# Patient Record
Sex: FEMALE | Race: BLACK OR AFRICAN AMERICAN | Employment: OTHER | URBAN - METROPOLITAN AREA
[De-identification: names, ages, dates, MRNs, and addresses within clinical notes are randomized per-mention and may not be internally consistent; named-entity substitution may affect disease eponyms.]

---

## 2022-06-10 ENCOUNTER — OFFICE VISIT (OUTPATIENT)
Dept: FAMILY MEDICINE CLINIC | Facility: CLINIC | Age: 77
End: 2022-06-10
Payer: COMMERCIAL

## 2022-06-10 VITALS
HEART RATE: 105 BPM | DIASTOLIC BLOOD PRESSURE: 75 MMHG | BODY MASS INDEX: 24.79 KG/M2 | WEIGHT: 147 LBS | HEIGHT: 64.5 IN | SYSTOLIC BLOOD PRESSURE: 118 MMHG

## 2022-06-10 DIAGNOSIS — M05.79 RHEUMATOID ARTHRITIS INVOLVING MULTIPLE SITES WITH POSITIVE RHEUMATOID FACTOR (HCC): ICD-10-CM

## 2022-06-10 DIAGNOSIS — I10 PRIMARY HYPERTENSION: ICD-10-CM

## 2022-06-10 DIAGNOSIS — K21.9 GASTROESOPHAGEAL REFLUX DISEASE, UNSPECIFIED WHETHER ESOPHAGITIS PRESENT: ICD-10-CM

## 2022-06-10 DIAGNOSIS — G62.9 SENSORY NEUROPATHY: Primary | ICD-10-CM

## 2022-06-10 DIAGNOSIS — G89.29 OTHER CHRONIC PAIN: ICD-10-CM

## 2022-06-10 PROCEDURE — 3074F SYST BP LT 130 MM HG: CPT | Performed by: FAMILY MEDICINE

## 2022-06-10 PROCEDURE — 3078F DIAST BP <80 MM HG: CPT | Performed by: FAMILY MEDICINE

## 2022-06-10 PROCEDURE — 1126F AMNT PAIN NOTED NONE PRSNT: CPT | Performed by: FAMILY MEDICINE

## 2022-06-10 PROCEDURE — 3008F BODY MASS INDEX DOCD: CPT | Performed by: FAMILY MEDICINE

## 2022-06-10 PROCEDURE — 99204 OFFICE O/P NEW MOD 45 MIN: CPT | Performed by: FAMILY MEDICINE

## 2022-06-10 RX ORDER — OXYCODONE HYDROCHLORIDE 10 MG/1
TABLET ORAL EVERY 6 HOURS
COMMUNITY
End: 2022-06-10

## 2022-06-10 RX ORDER — SACUBITRIL AND VALSARTAN 24; 26 MG/1; MG/1
TABLET, FILM COATED ORAL
COMMUNITY

## 2022-06-10 RX ORDER — CARVEDILOL 6.25 MG/1
6.25 TABLET ORAL 2 TIMES DAILY
Qty: 60 TABLET | Refills: 2 | Status: SHIPPED | OUTPATIENT
Start: 2022-06-10

## 2022-06-10 RX ORDER — FUROSEMIDE 40 MG/1
40 TABLET ORAL DAILY
COMMUNITY
Start: 2022-05-08

## 2022-06-10 RX ORDER — FUROSEMIDE 20 MG/1
TABLET ORAL
COMMUNITY

## 2022-06-10 RX ORDER — CARVEDILOL 6.25 MG/1
6.25 TABLET ORAL 2 TIMES DAILY
COMMUNITY
Start: 2022-02-12 | End: 2022-06-10

## 2022-06-10 RX ORDER — PREGABALIN 50 MG/1
50 CAPSULE ORAL 3 TIMES DAILY
Qty: 90 CAPSULE | Refills: 0 | Status: SHIPPED | OUTPATIENT
Start: 2022-06-10

## 2022-06-10 RX ORDER — PANTOPRAZOLE SODIUM 40 MG/1
40 TABLET, DELAYED RELEASE ORAL DAILY
Qty: 30 TABLET | Refills: 2 | Status: SHIPPED | OUTPATIENT
Start: 2022-06-10

## 2022-06-10 RX ORDER — OXYCODONE HYDROCHLORIDE 10 MG/1
10 TABLET ORAL EVERY 6 HOURS
Qty: 120 TABLET | Refills: 0 | Status: SHIPPED | OUTPATIENT
Start: 2022-06-10

## 2022-06-10 RX ORDER — ALENDRONATE SODIUM 70 MG/1
70 TABLET ORAL WEEKLY
COMMUNITY
Start: 2022-05-16

## 2022-06-10 RX ORDER — PANTOPRAZOLE SODIUM 40 MG/1
40 TABLET, DELAYED RELEASE ORAL DAILY
COMMUNITY
Start: 2022-04-07 | End: 2022-06-10

## 2022-06-10 RX ORDER — AMLODIPINE BESYLATE 10 MG/1
TABLET ORAL
COMMUNITY

## 2022-06-10 NOTE — PATIENT INSTRUCTIONS
All adult screening ordered and done appropriate for patient's age and gender and risk factors and complaints. Medication reviewed and renewed where needed and appropriate. Comply with medications. Monitor blood pressures and record at home. Limit salt intake. Keep all specialist appointments.   Will need ortho/hand specialist.

## 2022-07-05 DIAGNOSIS — M05.79 RHEUMATOID ARTHRITIS INVOLVING MULTIPLE SITES WITH POSITIVE RHEUMATOID FACTOR (HCC): ICD-10-CM

## 2022-07-06 NOTE — TELEPHONE ENCOUNTER
Please review. Protocol failed or does not have a protocol.      Requested Prescriptions   Pending Prescriptions Disp Refills    METHOTREXATE 2.5 MG Oral Tab [Pharmacy Med Name: METHOTREXATE 2.5MG TABLETS - YELLOW] 77 tablet 0     Sig: TAKE 6 TABLETS BY MOUTH ONE DAY A WEEK        There is no refill protocol information for this order         Future Appointments         Provider Department Appt Notes    In 2 days Aniyah Espinal DO [facility], [facility], [facility] [place] [address] 1 month f/u per Dr. Irasema Caceres Visits              3 weeks ago Sensory neuropathy    [facility], [facility], [address]    Office Visit

## 2022-07-07 ENCOUNTER — OFFICE VISIT (OUTPATIENT)
Dept: FAMILY MEDICINE CLINIC | Facility: CLINIC | Age: 77
End: 2022-07-07
Payer: COMMERCIAL

## 2022-07-07 VITALS
HEART RATE: 97 BPM | WEIGHT: 152.38 LBS | HEIGHT: 64.5 IN | DIASTOLIC BLOOD PRESSURE: 59 MMHG | BODY MASS INDEX: 25.7 KG/M2 | SYSTOLIC BLOOD PRESSURE: 91 MMHG | OXYGEN SATURATION: 99 %

## 2022-07-07 DIAGNOSIS — M05.79 RHEUMATOID ARTHRITIS INVOLVING MULTIPLE SITES WITH POSITIVE RHEUMATOID FACTOR (HCC): ICD-10-CM

## 2022-07-07 DIAGNOSIS — M51.36 LUMBAR DEGENERATIVE DISC DISEASE: ICD-10-CM

## 2022-07-07 DIAGNOSIS — M54.2 BILATERAL POSTERIOR NECK PAIN: ICD-10-CM

## 2022-07-07 DIAGNOSIS — Z13.820 ENCOUNTER FOR OSTEOPOROSIS SCREENING IN ASYMPTOMATIC POSTMENOPAUSAL PATIENT: Primary | ICD-10-CM

## 2022-07-07 DIAGNOSIS — Z78.0 ENCOUNTER FOR OSTEOPOROSIS SCREENING IN ASYMPTOMATIC POSTMENOPAUSAL PATIENT: Primary | ICD-10-CM

## 2022-07-07 PROCEDURE — 3074F SYST BP LT 130 MM HG: CPT | Performed by: FAMILY MEDICINE

## 2022-07-07 PROCEDURE — 1125F AMNT PAIN NOTED PAIN PRSNT: CPT | Performed by: FAMILY MEDICINE

## 2022-07-07 PROCEDURE — 3008F BODY MASS INDEX DOCD: CPT | Performed by: FAMILY MEDICINE

## 2022-07-07 PROCEDURE — 3078F DIAST BP <80 MM HG: CPT | Performed by: FAMILY MEDICINE

## 2022-07-07 PROCEDURE — 99214 OFFICE O/P EST MOD 30 MIN: CPT | Performed by: FAMILY MEDICINE

## 2022-07-07 RX ORDER — HYDROCODONE BITARTRATE AND ACETAMINOPHEN 10; 325 MG/1; MG/1
1 TABLET ORAL EVERY 6 HOURS PRN
Qty: 120 TABLET | Refills: 0 | Status: SHIPPED | OUTPATIENT
Start: 2022-07-07

## 2022-07-07 NOTE — PATIENT INSTRUCTIONS
Pain management via prescription medications as needed. Medication reviewed and renewed where needed and appropriate. Comply with medications. Monitor blood pressures and record at home. Limit salt intake. Keep appointments with rheumatologist.  X-ray of the cervical spine recommended and ordered on today. Patient also would benefit from physiatry referral which has been placed on the patient's chart. Patient to have hydrocodone prescription sent to the pharmacy.

## 2022-07-13 DIAGNOSIS — K21.9 GASTROESOPHAGEAL REFLUX DISEASE, UNSPECIFIED WHETHER ESOPHAGITIS PRESENT: ICD-10-CM

## 2022-07-13 RX ORDER — PANTOPRAZOLE SODIUM 40 MG/1
40 TABLET, DELAYED RELEASE ORAL DAILY
Qty: 30 TABLET | Refills: 2 | Status: SHIPPED | OUTPATIENT
Start: 2022-07-13

## 2022-07-19 RX ORDER — MAGNESIUM OXIDE 400 MG (241.3 MG MAGNESIUM) TABLET
TABLET
COMMUNITY

## 2022-07-20 ENCOUNTER — HOSPITAL ENCOUNTER (OUTPATIENT)
Dept: BONE DENSITY | Facility: HOSPITAL | Age: 77
Discharge: HOME | End: 2022-07-20
Attending: FAMILY MEDICINE
Payer: MEDICARE

## 2022-07-20 ENCOUNTER — HOSPITAL ENCOUNTER (OUTPATIENT)
Dept: GENERAL RADIOLOGY | Facility: HOSPITAL | Age: 77
Discharge: HOME | End: 2022-07-20
Attending: FAMILY MEDICINE
Payer: MEDICARE

## 2022-07-20 DIAGNOSIS — M54.2 BILATERAL POSTERIOR NECK PAIN: ICD-10-CM

## 2022-07-20 DIAGNOSIS — Z78.0 ENCOUNTER FOR OSTEOPOROSIS SCREENING IN ASYMPTOMATIC POSTMENOPAUSAL PATIENT: ICD-10-CM

## 2022-07-20 DIAGNOSIS — Z13.820 ENCOUNTER FOR OSTEOPOROSIS SCREENING IN ASYMPTOMATIC POSTMENOPAUSAL PATIENT: ICD-10-CM

## 2022-07-20 PROCEDURE — 72050 X-RAY EXAM NECK SPINE 4/5VWS: CPT | Performed by: FAMILY MEDICINE

## 2022-07-20 PROCEDURE — 77080 DXA BONE DENSITY AXIAL: CPT | Performed by: FAMILY MEDICINE

## 2022-07-22 DIAGNOSIS — G62.9 SENSORY NEUROPATHY: ICD-10-CM

## 2022-07-22 RX ORDER — PREGABALIN 50 MG/1
50 CAPSULE ORAL 3 TIMES DAILY
Qty: 90 CAPSULE | Refills: 0 | Status: SHIPPED | OUTPATIENT
Start: 2022-07-22

## 2022-08-09 DIAGNOSIS — M05.79 RHEUMATOID ARTHRITIS INVOLVING MULTIPLE SITES WITH POSITIVE RHEUMATOID FACTOR (HCC): ICD-10-CM

## 2022-08-09 DIAGNOSIS — M51.36 LUMBAR DEGENERATIVE DISC DISEASE: ICD-10-CM

## 2022-08-09 DIAGNOSIS — M54.2 BILATERAL POSTERIOR NECK PAIN: ICD-10-CM

## 2022-08-09 RX ORDER — FUROSEMIDE 20 MG/1
TABLET ORAL
Qty: 30 TABLET | Refills: 0 | Status: SHIPPED | OUTPATIENT
Start: 2022-08-09 | End: 2022-08-10

## 2022-08-09 RX ORDER — HYDROCODONE BITARTRATE AND ACETAMINOPHEN 10; 325 MG/1; MG/1
1 TABLET ORAL EVERY 6 HOURS PRN
Qty: 120 TABLET | Refills: 0 | Status: SHIPPED | OUTPATIENT
Start: 2022-08-09

## 2022-08-09 NOTE — TELEPHONE ENCOUNTER
Received call from patient requesting refills. Please review and update pended scripts and sign if appropriate.

## 2022-08-10 RX ORDER — FUROSEMIDE 20 MG/1
TABLET ORAL
Qty: 90 TABLET | Refills: 0 | Status: SHIPPED | OUTPATIENT
Start: 2022-08-10

## 2022-08-18 ENCOUNTER — OFFICE VISIT (OUTPATIENT)
Dept: FAMILY MEDICINE CLINIC | Facility: CLINIC | Age: 77
End: 2022-08-18
Payer: COMMERCIAL

## 2022-08-18 VITALS
DIASTOLIC BLOOD PRESSURE: 88 MMHG | BODY MASS INDEX: 25 KG/M2 | SYSTOLIC BLOOD PRESSURE: 150 MMHG | TEMPERATURE: 98 F | HEART RATE: 72 BPM | OXYGEN SATURATION: 99 % | RESPIRATION RATE: 16 BRPM | WEIGHT: 147.63 LBS

## 2022-08-18 DIAGNOSIS — M47.22 OSTEOARTHRITIS OF SPINE WITH RADICULOPATHY, CERVICAL REGION: ICD-10-CM

## 2022-08-18 DIAGNOSIS — M43.12 ANTEROLISTHESIS OF CERVICAL SPINE: Primary | ICD-10-CM

## 2022-08-18 DIAGNOSIS — G62.9 SENSORY NEUROPATHY: ICD-10-CM

## 2022-08-18 PROCEDURE — 3079F DIAST BP 80-89 MM HG: CPT | Performed by: FAMILY MEDICINE

## 2022-08-18 PROCEDURE — 99214 OFFICE O/P EST MOD 30 MIN: CPT | Performed by: FAMILY MEDICINE

## 2022-08-18 PROCEDURE — 3077F SYST BP >= 140 MM HG: CPT | Performed by: FAMILY MEDICINE

## 2022-08-18 RX ORDER — MAGNESIUM OXIDE 400 MG (241.3 MG MAGNESIUM) TABLET
800 TABLET DAILY
Qty: 90 TABLET | Refills: 1 | Status: SHIPPED | OUTPATIENT
Start: 2022-08-18

## 2022-08-18 RX ORDER — PREGABALIN 50 MG/1
50 CAPSULE ORAL 3 TIMES DAILY
Qty: 90 CAPSULE | Refills: 0 | Status: SHIPPED | OUTPATIENT
Start: 2022-08-18

## 2022-08-18 NOTE — PATIENT INSTRUCTIONS
To physiatry regarding cervical complaints and findings. Keep rheumatology follow up appointments. Pain management. 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 Patient has C4-C5 anterolisthesis, multiple levels of vertebral body and facet joint spurs, and also narrowed discs at C5-C6 and also C6-C7.

## 2022-09-08 DIAGNOSIS — I10 PRIMARY HYPERTENSION: ICD-10-CM

## 2022-09-08 DIAGNOSIS — M54.2 BILATERAL POSTERIOR NECK PAIN: ICD-10-CM

## 2022-09-08 DIAGNOSIS — M51.36 LUMBAR DEGENERATIVE DISC DISEASE: ICD-10-CM

## 2022-09-08 DIAGNOSIS — M05.79 RHEUMATOID ARTHRITIS INVOLVING MULTIPLE SITES WITH POSITIVE RHEUMATOID FACTOR (HCC): ICD-10-CM

## 2022-09-08 NOTE — TELEPHONE ENCOUNTER
Please review refill protocol failed/ no protocol  Requested Prescriptions   Pending Prescriptions Disp Refills    HYDROcodone-acetaminophen (NORCO)  MG Oral Tab 120 tablet 0     Sig: Take 1 tablet by mouth every 6 (six) hours as needed for Pain.         There is no refill protocol information for this order

## 2022-09-09 RX ORDER — HYDROCODONE BITARTRATE AND ACETAMINOPHEN 10; 325 MG/1; MG/1
1 TABLET ORAL EVERY 6 HOURS PRN
Qty: 120 TABLET | Refills: 0 | Status: SHIPPED | OUTPATIENT
Start: 2022-09-09 | End: 2022-09-12

## 2022-09-09 RX ORDER — CARVEDILOL 6.25 MG/1
6.25 TABLET ORAL 2 TIMES DAILY
Qty: 60 TABLET | Refills: 2 | Status: SHIPPED | OUTPATIENT
Start: 2022-09-09

## 2022-09-09 RX ORDER — HYDROCODONE BITARTRATE AND ACETAMINOPHEN 10; 325 MG/1; MG/1
1 TABLET ORAL EVERY 6 HOURS PRN
Qty: 120 TABLET | Refills: 0 | Status: SHIPPED | OUTPATIENT
Start: 2022-09-09

## 2022-09-12 ENCOUNTER — HOSPITAL ENCOUNTER (OUTPATIENT)
Dept: GENERAL RADIOLOGY | Facility: HOSPITAL | Age: 77
Discharge: HOME | End: 2022-09-12
Attending: PHYSICAL MEDICINE & REHABILITATION
Payer: MEDICARE

## 2022-09-12 ENCOUNTER — OFFICE VISIT (OUTPATIENT)
Dept: PHYSICAL MEDICINE AND REHAB | Facility: CLINIC | Age: 77
End: 2022-09-12
Payer: COMMERCIAL

## 2022-09-12 VITALS
SYSTOLIC BLOOD PRESSURE: 110 MMHG | DIASTOLIC BLOOD PRESSURE: 66 MMHG | HEIGHT: 64.5 IN | BODY MASS INDEX: 25.97 KG/M2 | WEIGHT: 154 LBS

## 2022-09-12 DIAGNOSIS — M47.812 ARTHROPATHY OF CERVICAL FACET JOINT: ICD-10-CM

## 2022-09-12 DIAGNOSIS — M48.02 DEGENERATIVE CERVICAL SPINAL STENOSIS: ICD-10-CM

## 2022-09-12 DIAGNOSIS — M43.12 ANTEROLISTHESIS OF CERVICAL SPINE: ICD-10-CM

## 2022-09-12 DIAGNOSIS — M43.12 ANTEROLISTHESIS OF CERVICAL SPINE: Primary | ICD-10-CM

## 2022-09-12 PROCEDURE — 72050 X-RAY EXAM NECK SPINE 4/5VWS: CPT | Performed by: PHYSICAL MEDICINE & REHABILITATION

## 2022-09-12 RX ORDER — PREGABALIN 75 MG/1
75 CAPSULE ORAL 3 TIMES DAILY
Qty: 90 CAPSULE | Refills: 0 | Status: SHIPPED | OUTPATIENT
Start: 2022-09-12

## 2022-09-12 RX ORDER — METHYLPREDNISOLONE 4 MG/1
TABLET ORAL
Qty: 1 EACH | Refills: 0 | Status: SHIPPED | OUTPATIENT
Start: 2022-09-12

## 2022-09-12 NOTE — PATIENT INSTRUCTIONS
-Start physical therapy and home exercises  -Medrol dose pack to be started today  -Increase Lyrica to 75mg three times daily  -Ice/Heat as tolerated  -Xray on the way out today  -Please stop the medication if you have any side effects and call the office if you have any questions or concerns  -If no better will consider MRI for further evaluation  -Follow up in 4 weeks

## 2022-10-11 ENCOUNTER — OFFICE VISIT (OUTPATIENT)
Dept: PHYSICAL MEDICINE AND REHAB | Facility: CLINIC | Age: 77
End: 2022-10-11
Payer: COMMERCIAL

## 2022-10-11 VITALS
DIASTOLIC BLOOD PRESSURE: 96 MMHG | WEIGHT: 154 LBS | OXYGEN SATURATION: 98 % | BODY MASS INDEX: 25.97 KG/M2 | HEART RATE: 67 BPM | HEIGHT: 64.5 IN | SYSTOLIC BLOOD PRESSURE: 148 MMHG

## 2022-10-11 DIAGNOSIS — K21.9 GASTROESOPHAGEAL REFLUX DISEASE, UNSPECIFIED WHETHER ESOPHAGITIS PRESENT: ICD-10-CM

## 2022-10-11 DIAGNOSIS — M48.02 DEGENERATIVE CERVICAL SPINAL STENOSIS: ICD-10-CM

## 2022-10-11 DIAGNOSIS — M47.812 ARTHROPATHY OF CERVICAL FACET JOINT: ICD-10-CM

## 2022-10-11 DIAGNOSIS — M43.12 ANTEROLISTHESIS OF CERVICAL SPINE: Primary | ICD-10-CM

## 2022-10-11 PROCEDURE — 3080F DIAST BP >= 90 MM HG: CPT | Performed by: PHYSICAL MEDICINE & REHABILITATION

## 2022-10-11 PROCEDURE — 99214 OFFICE O/P EST MOD 30 MIN: CPT | Performed by: PHYSICAL MEDICINE & REHABILITATION

## 2022-10-11 PROCEDURE — 3077F SYST BP >= 140 MM HG: CPT | Performed by: PHYSICAL MEDICINE & REHABILITATION

## 2022-10-11 PROCEDURE — 3008F BODY MASS INDEX DOCD: CPT | Performed by: PHYSICAL MEDICINE & REHABILITATION

## 2022-10-11 PROCEDURE — 1125F AMNT PAIN NOTED PAIN PRSNT: CPT | Performed by: PHYSICAL MEDICINE & REHABILITATION

## 2022-10-11 RX ORDER — ADALIMUMAB 40MG/0.4ML
KIT SUBCUTANEOUS
COMMUNITY
Start: 2022-09-20

## 2022-10-11 NOTE — PATIENT INSTRUCTIONS
-Lyrica 75mg three times daily  -Ice/Heat   -Start PT and home exercises  -Follow up in 4 weeks  -If no better will consider injection

## 2022-10-12 RX ORDER — PANTOPRAZOLE SODIUM 40 MG/1
40 TABLET, DELAYED RELEASE ORAL DAILY
Qty: 90 TABLET | Refills: 1 | Status: SHIPPED | OUTPATIENT
Start: 2022-10-12

## 2022-10-12 NOTE — TELEPHONE ENCOUNTER
Refill passed per [facility] Graciela protocol. Requested Prescriptions   Pending Prescriptions Disp Refills    pantoprazole 40 MG Oral Tab EC 30 tablet 2     Sig: Take 1 tablet (40 mg total) by mouth daily.         Gastrointestional Medication Protocol Passed - 10/11/2022  5:57 PM        Passed - In person appointment or virtual visit in the past 12 mos or appointment in next 3 mos       Recent Outpatient Visits              Yesterday Anterolisthesis of cervical spine    [address] [facility] [place] Nery Lee, DO    Office Visit    1 month ago Anterolisthesis of cervical spine    [address] [facility] [place]    Office Visit    1 month ago Anterolisthesis of cervical spine    [address] [place], Fadi Patel, DO    Office Visit    3 months ago Encounter for osteoporosis screening in asymptomatic postmenopausal patient    [address] [place], Fadi Patel, DO    Office Visit    4 months ago Sensory neuropathy    [address] [place], Fadi Patel, DO    Office Visit     Future Appointments         Provider Department Appt Notes    In 1 month Nery Castañeda, [address] [facility] [place] 4 weeks in office                       Recent Outpatient Visits              Yesterday Anterolisthesis of cervical spine    [address] [facility], [place] Scott Damon, DO    Office Visit    1 month ago Anterolisthesis of cervical spine    [address] [facility] [place]    Office Visit    1 month ago Anterolisthesis of cervical spine    [address] [place], Fadi Patel, DO    Office Visit    3 months ago Encounter for osteoporosis screening in asymptomatic postmenopausal patient [address] [place], DO    Office Visit    4 months ago Sensory neuropathy    [address] [place], DO    Office Visit            Future Appointments         Provider Department Appt Notes    In 1 month Gina Huggins  [facility] [place] 4 weeks in office

## 2022-10-18 ENCOUNTER — NURSE ONLY (OUTPATIENT)
Dept: LAB | Facility: HOSPITAL | Age: 77
End: 2022-10-18
Attending: INTERNAL MEDICINE
Payer: MEDICARE

## 2022-10-18 ENCOUNTER — HOSPITAL ENCOUNTER (OUTPATIENT)
Dept: GENERAL RADIOLOGY | Facility: HOSPITAL | Age: 77
Discharge: HOME | End: 2022-10-18
Attending: INTERNAL MEDICINE
Payer: MEDICARE

## 2022-10-18 DIAGNOSIS — Z01.818 PRE-OP TESTING: ICD-10-CM

## 2022-10-18 LAB
ANION GAP SERPL CALC-SCNC: 8 MMOL/L (ref 0–18)
BUN BLD-MCNC: 8 MG/DL (ref 7–18)
BUN/CREAT SERPL: 7.8 (ref 10–20)
CALCIUM BLD-MCNC: 9.2 MG/DL (ref 8.5–10.1)
CHLORIDE SERPL-SCNC: 109 MMOL/L (ref 98–112)
CO2 SERPL-SCNC: 24 MMOL/L (ref 21–32)
CREAT BLD-MCNC: 1.02 MG/DL
DEPRECATED RDW RBC AUTO: 49.8 FL (ref 35.1–46.3)
ERYTHROCYTE [DISTWIDTH] IN BLOOD BY AUTOMATED COUNT: 14.7 % (ref 11–15)
FASTING STATUS PATIENT QL REPORTED: YES
GFR SERPLBLD BASED ON 1.73 SQ M-ARVRAT: 57 ML/MIN/1.73M2 (ref 60–?)
GLUCOSE BLD-MCNC: 89 MG/DL (ref 70–99)
HCT VFR BLD AUTO: 37.2 %
HGB BLD-MCNC: 11.7 G/DL
INR BLD: 1.13 (ref 0.85–1.16)
MCH RBC QN AUTO: 29.3 PG (ref 26–34)
MCHC RBC AUTO-ENTMCNC: 31.5 G/DL (ref 31–37)
MCV RBC AUTO: 93 FL
OSMOLALITY SERPL CALC.SUM OF ELEC: 290 MOSM/KG (ref 275–295)
PLATELET # BLD AUTO: 205 10(3)UL (ref 150–450)
POTASSIUM SERPL-SCNC: 3.9 MMOL/L (ref 3.5–5.1)
PROTHROMBIN TIME: 14.5 SECONDS (ref 11.6–14.8)
RBC # BLD AUTO: 4 X10(6)UL
SODIUM SERPL-SCNC: 141 MMOL/L (ref 136–145)
WBC # BLD AUTO: 4 X10(3) UL (ref 4–11)

## 2022-10-18 PROCEDURE — 93005 ELECTROCARDIOGRAM TRACING: CPT

## 2022-10-18 PROCEDURE — 71046 X-RAY EXAM CHEST 2 VIEWS: CPT | Performed by: INTERNAL MEDICINE

## 2022-10-18 PROCEDURE — 85610 PROTHROMBIN TIME: CPT

## 2022-10-18 PROCEDURE — 80048 BASIC METABOLIC PNL TOTAL CA: CPT

## 2022-10-18 PROCEDURE — 36415 COLL VENOUS BLD VENIPUNCTURE: CPT

## 2022-10-18 PROCEDURE — 85027 COMPLETE CBC AUTOMATED: CPT

## 2022-10-18 PROCEDURE — 93010 ELECTROCARDIOGRAM REPORT: CPT | Performed by: INTERNAL MEDICINE

## 2022-10-19 ENCOUNTER — MED REC SCAN ONLY (OUTPATIENT)
Dept: PHYSICAL MEDICINE AND REHAB | Facility: CLINIC | Age: 77
End: 2022-10-19

## 2022-10-19 LAB — SARS-COV-2 RNA RESP QL NAA+PROBE: DETECTED

## 2022-10-21 ENCOUNTER — HOSPITAL ENCOUNTER (OUTPATIENT)
Dept: INTERVENTIONAL RADIOLOGY/VASCULAR | Facility: HOSPITAL | Age: 77
Discharge: HOME | End: 2022-10-21
Attending: INTERNAL MEDICINE | Admitting: INTERNAL MEDICINE
Payer: MEDICARE

## 2022-10-21 ENCOUNTER — TELEPHONE (OUTPATIENT)
Dept: FAMILY MEDICINE CLINIC | Facility: CLINIC | Age: 77
End: 2022-10-21

## 2022-10-21 VITALS
HEART RATE: 73 BPM | WEIGHT: 156 LBS | SYSTOLIC BLOOD PRESSURE: 170 MMHG | OXYGEN SATURATION: 97 % | RESPIRATION RATE: 20 BRPM | DIASTOLIC BLOOD PRESSURE: 83 MMHG | TEMPERATURE: 100 F | BODY MASS INDEX: 26 KG/M2

## 2022-10-21 DIAGNOSIS — I50.9: ICD-10-CM

## 2022-10-21 DIAGNOSIS — Z01.818 PRE-OP TESTING: Primary | ICD-10-CM

## 2022-10-21 DIAGNOSIS — I50.22 CHRONIC HFREF (HEART FAILURE WITH REDUCED EJECTION FRACTION) (HCC): ICD-10-CM

## 2022-10-21 DIAGNOSIS — I50.20 HEART FAILURE WITH REDUCED EJECTION FRACTION (HCC): ICD-10-CM

## 2022-10-21 PROCEDURE — 93010 ELECTROCARDIOGRAM REPORT: CPT | Performed by: INTERNAL MEDICINE

## 2022-10-21 PROCEDURE — 36415 COLL VENOUS BLD VENIPUNCTURE: CPT

## 2022-10-21 PROCEDURE — 93460 R&L HRT ART/VENTRICLE ANGIO: CPT

## 2022-10-21 PROCEDURE — 93005 ELECTROCARDIOGRAM TRACING: CPT

## 2022-10-21 PROCEDURE — 99152 MOD SED SAME PHYS/QHP 5/>YRS: CPT

## 2022-10-21 PROCEDURE — 4A023N8 MEASUREMENT OF CARDIAC SAMPLING AND PRESSURE, BILATERAL, PERCUTANEOUS APPROACH: ICD-10-PCS | Performed by: INTERNAL MEDICINE

## 2022-10-21 PROCEDURE — B2111ZZ FLUOROSCOPY OF MULTIPLE CORONARY ARTERIES USING LOW OSMOLAR CONTRAST: ICD-10-PCS | Performed by: INTERNAL MEDICINE

## 2022-10-21 RX ORDER — MIDAZOLAM HYDROCHLORIDE 1 MG/ML
INJECTION INTRAMUSCULAR; INTRAVENOUS
Status: COMPLETED
Start: 2022-10-21 | End: 2022-10-21

## 2022-10-21 RX ORDER — NITROGLYCERIN 20 MG/100ML
INJECTION INTRAVENOUS
Status: DISCONTINUED
Start: 2022-10-21 | End: 2022-10-21 | Stop reason: WASHOUT

## 2022-10-21 RX ORDER — HEPARIN SODIUM 1000 [USP'U]/ML
INJECTION, SOLUTION INTRAVENOUS; SUBCUTANEOUS
Status: DISCONTINUED
Start: 2022-10-21 | End: 2022-10-21 | Stop reason: WASHOUT

## 2022-10-21 RX ORDER — CARVEDILOL 12.5 MG/1
12.5 TABLET ORAL 2 TIMES DAILY
COMMUNITY
Start: 2022-10-17

## 2022-10-21 RX ORDER — SODIUM CHLORIDE 9 MG/ML
INJECTION, SOLUTION INTRAVENOUS
Status: COMPLETED | OUTPATIENT
Start: 2022-10-21 | End: 2022-10-21

## 2022-10-21 RX ORDER — FUROSEMIDE 40 MG/1
40 TABLET ORAL DAILY
COMMUNITY
Start: 2022-10-17

## 2022-10-21 RX ORDER — LIDOCAINE HYDROCHLORIDE 20 MG/ML
INJECTION, SOLUTION EPIDURAL; INFILTRATION; INTRACAUDAL; PERINEURAL
Status: COMPLETED
Start: 2022-10-21 | End: 2022-10-21

## 2022-10-21 RX ADMIN — SODIUM CHLORIDE: 9 INJECTION, SOLUTION INTRAVENOUS at 07:45:00

## 2022-10-21 NOTE — INTERVAL H&P NOTE
Pre-op Diagnosis: * No surgery found *    The above referenced H&P was reviewed by Tomaas Celis MD on 10/21/2022, the patient was examined and no significant changes have occurred in the patient's condition since the H&P was performed. I discussed with the patient and/or legal representative the potential benefits, risks and side effects of this procedure; the likelihood of the patient achieving goals; and potential problems that might occur during recuperation. I discussed reasonable alternatives to the procedure, including risks, benefits and side effects related to the alternatives and risks related to not receiving this procedure. We will proceed with procedure as planned.

## 2022-10-21 NOTE — PROCEDURES
[facility]    Cardiac Cath Procedure Note    Cristian Acosta Patient Status:  Outpatient in a Bed    3/14/1945 MRN [id number]   Location [facility] Attending Yonas Jacob MD   Hosp Day # 0 PCP Alfredo Sotelo DO       Cardiologist: Perla Juares MD  Primary Proceduralist: Perla Juares MD  Procedure Performed: LHC, RHC and LV  Date of Procedure: 10/21/2022   Indication: HFrEF    Summary of procedure:    Nonischemic cardiomyopathy: Not medically optimized based on elevated blood pressures but compensated based on cardiac index    New atrial fibrillation, rate controlled      Left Ventriculography and hemodynamics:   LV EF not done  LV EDP 14 mmHg  No gradient across aortic valve    RA 11  RV 38/6  PA 40/13 mean 28  PCW 18    CO 4.1/ CI 2.3  SVR 1839/       Coronary Angiography  RCA:  Dominant and free of obstructive disease, supplies PDA and PL    Left main:  Free of obstructive disease    Left anterior descending:  Free of obstructive disease, supplies multiple diagonals which are non-obstructive    Circumflex:  Free of obstructive disease, supplies multiple OM branches which are patent      Assessment:  Nonischemic cardiomyopathy: Either secondary to paroxysmal atrial fibrillation or uncontrolled hypertension    New atrial fibrillation, asymptomatic and therefore likely paroxysmal atrial fibrillation    Uncontrolled hypertension    PVD: Right femoral artery small, heavily fibrosed and calcified      Recommendations:  No changes to Lasix  Increase Entresto to 49/51 twice daily  Start Eliquis 5 mg twice daily  Will discuss rhythm control and vascular assessment in the office    Discharge home in 2 hours      Description of Procedure:   After written informed consent was obtained from the patient, patient was brought to the cardiac catheterization laboratory. Patient was prepped and draped in the usual sterile fashion.  Lidocaine 1% was used to infiltrate the right groin for local anesthesia and a 6 Georgian introducer sheath was inserted into the right femoral artery via ultrasound guidance and micropuncture kit. Right femoral vein accessed by palpation. 7 FR sheath placed. Monitoring swan advanced to RA and pressures recorded in RA, RV, PA and wedge positions under fluoroscopic and hemodynamic guidance. Selective coronary angiography performed with JR4 catheter for RCA and JL4 catheter for LCA. Angiography performed in standard projections. 6 Maori JR4 catheter placed in LV for hemodynamics. Selective right femoral angiogram done assess anatomy for closure. Specimen sent to: No specimen collected  Estimated blood loss: 10 cc  Closure:  Perclose       IV was maintained by RN and moderate conscious sedation of versed and fentanyl was given. Patient was assessed and monitoring of oxygen, heart rate and blood pressure by nurse and myself during the exam 35 minutes.       Aleta Gambino MD  10/21/22

## 2022-10-21 NOTE — IVS NOTE
DISCHARGE NOTE     Pt is able to sit up and ambulate without difficulty. Pt voided and tolerated fluids and food. Procedural site remains dry and intact with good circulation, motion, and sensation. No signs and symptoms of bleeding/hematoma noted. IV access removed  Instruction provided, patient/family verbalizes understanding. Dr. Winston Fernando spoke with patient/family post procedure. Pt discharge via wheelchair to [address]       Follow up Appointment: 10/31 at 1120 with Dr. Rosario Lujan Prescription:  Dorothy Best 49/51 BID. Eliquis 5mg BID starting tomorrow - both RX ready to be picked up in 1 hour and covered at $75 for 3 months supply.

## 2022-10-21 NOTE — TELEPHONE ENCOUNTER
Ahmet gómez from [facility] requesting notes from [address] [place] [address] as Anny Irby is a new patient to there office      Notes  Faxed to [phone]  Via Right Fax

## 2022-10-24 NOTE — TELEPHONE ENCOUNTER
Refill Request    Medication request: pregabalin 75 MG Oral Cap. Take 1 capsule (75 mg total) by mouth 3 (three) times daily. LOV:10/11/2022 Krissy Madden DO   Due back to clinic per last office note: Per Dr. Armand Romero: \"Follow up in 4 weeks. \"  NOV: 11/15/2022 Krissy Madden DO      [place]: 09/14/2022 #90    Urine drug screen (if applicable): n/a  Pain contract: none    LOV plan (if weaning or changing medications): Per Dr. Armand Romero: Sharolyn Hence 75mg three times daily. \"

## 2022-10-25 RX ORDER — PREGABALIN 75 MG/1
CAPSULE ORAL
Qty: 90 CAPSULE | Refills: 0 | Status: SHIPPED | OUTPATIENT
Start: 2022-10-25

## 2022-10-27 DIAGNOSIS — M05.79 RHEUMATOID ARTHRITIS INVOLVING MULTIPLE SITES WITH POSITIVE RHEUMATOID FACTOR (HCC): ICD-10-CM

## 2022-10-27 DIAGNOSIS — M51.36 LUMBAR DEGENERATIVE DISC DISEASE: ICD-10-CM

## 2022-10-27 DIAGNOSIS — M54.2 BILATERAL POSTERIOR NECK PAIN: ICD-10-CM

## 2022-10-27 NOTE — TELEPHONE ENCOUNTER
Please review. Protocol failed / No protocol. Requested Prescriptions   Pending Prescriptions Disp Refills    HYDROcodone-acetaminophen (NORCO)  MG Oral Tab 120 tablet 0     Sig: Take 1 tablet by mouth every 6 (six) hours as needed for Pain.        There is no refill protocol information for this order           Recent Outpatient Visits              1 week ago Pre-op testing    [facility] Only    1 week ago Pre-op testing    [facility]    Nurse Only    2 weeks ago Anterolisthesis of cervical spine    [facility] Krissy Madden, DO    Office Visit    1 month ago Anterolisthesis of cervical spine    [facility] Krissy Madden, DO    Office Visit    2 months ago Anterolisthesis of cervical spine    [address] [place], Louisa Shey, DO    Office Visit             Future Appointments         Provider Department Appt Notes    In 2 weeks Krissy Madden, [facility] 4 weeks in office

## 2022-10-28 RX ORDER — HYDROCODONE BITARTRATE AND ACETAMINOPHEN 10; 325 MG/1; MG/1
1 TABLET ORAL EVERY 6 HOURS PRN
Qty: 120 TABLET | Refills: 0 | Status: SHIPPED | OUTPATIENT
Start: 2022-10-28

## 2022-11-27 ENCOUNTER — APPOINTMENT (OUTPATIENT)
Dept: CT IMAGING | Facility: HOSPITAL | Age: 77
End: 2022-11-27
Payer: MEDICARE

## 2022-11-27 ENCOUNTER — APPOINTMENT (OUTPATIENT)
Dept: MRI IMAGING | Facility: HOSPITAL | Age: 77
End: 2022-11-27
Attending: EMERGENCY MEDICINE
Payer: MEDICARE

## 2022-11-27 ENCOUNTER — APPOINTMENT (OUTPATIENT)
Dept: GENERAL RADIOLOGY | Facility: HOSPITAL | Age: 77
End: 2022-11-27
Attending: EMERGENCY MEDICINE
Payer: MEDICARE

## 2022-11-27 ENCOUNTER — HOSPITAL ENCOUNTER (OUTPATIENT)
Facility: HOSPITAL | Age: 77
Setting detail: OBSERVATION
Discharge: HOME | End: 2022-11-30
Attending: EMERGENCY MEDICINE | Admitting: HOSPITALIST
Payer: MEDICARE

## 2022-11-27 DIAGNOSIS — R56.9 SEIZURE (HCC): ICD-10-CM

## 2022-11-27 DIAGNOSIS — R41.82 ALTERED MENTAL STATUS, UNSPECIFIED ALTERED MENTAL STATUS TYPE: Primary | ICD-10-CM

## 2022-11-27 DIAGNOSIS — E87.20 LACTIC ACIDOSIS: ICD-10-CM

## 2022-11-27 LAB
ALBUMIN SERPL-MCNC: 4.6 G/DL (ref 3.4–5)
ALBUMIN/GLOB SERPL: 0.8 {RATIO} (ref 1–2)
ALP LIVER SERPL-CCNC: 131 U/L
ALT SERPL-CCNC: 14 U/L
AMMONIA PLAS-MCNC: 15 UMOL/L (ref 11–32)
ANION GAP SERPL CALC-SCNC: 15 MMOL/L (ref 0–18)
AST SERPL-CCNC: 16 U/L (ref 15–37)
BILIRUB SERPL-MCNC: 0.9 MG/DL (ref 0.1–2)
BILIRUB UR QL CFM: NEGATIVE
BUN BLD-MCNC: 10 MG/DL (ref 7–18)
BUN/CREAT SERPL: 8.8 (ref 10–20)
CALCIUM BLD-MCNC: 10.5 MG/DL (ref 8.5–10.1)
CHLORIDE SERPL-SCNC: 102 MMOL/L (ref 98–112)
CLARITY UR: CLEAR
CO2 SERPL-SCNC: 19 MMOL/L (ref 21–32)
COLOR CSF: COLORLESS
COLOR UR: YELLOW
CREAT BLD-MCNC: 1.13 MG/DL
GFR SERPLBLD BASED ON 1.73 SQ M-ARVRAT: 50 ML/MIN/1.73M2 (ref 60–?)
GLOBULIN PLAS-MCNC: 6.1 G/DL (ref 2.8–4.4)
GLUCOSE BLD-MCNC: 132 MG/DL (ref 70–99)
GLUCOSE BLDC GLUCOMTR-MCNC: 90 MG/DL (ref 70–99)
GLUCOSE CSF-MCNC: 64 MG/DL (ref 40–70)
GLUCOSE UR-MCNC: NEGATIVE MG/DL
HYALINE CASTS #/AREA URNS AUTO: PRESENT /LPF
HYALINE CASTS #/AREA URNS AUTO: PRESENT /LPF
KETONES UR-MCNC: 40 MG/DL
LACTATE SERPL-SCNC: 12.4 MMOL/L (ref 0.4–2)
LACTATE SERPL-SCNC: 3.6 MMOL/L (ref 0.4–2)
LACTATE SERPL-SCNC: 3.9 MMOL/L (ref 0.4–2)
LEUKOCYTE ESTERASE UR QL STRIP.AUTO: NEGATIVE
NITRITE UR QL STRIP.AUTO: NEGATIVE
OSMOLALITY SERPL CALC.SUM OF ELEC: 283 MOSM/KG (ref 275–295)
PH UR: 6.5 [PH] (ref 5–8)
POTASSIUM SERPL-SCNC: 3.9 MMOL/L (ref 3.5–5.1)
PROT PATTERN CSF ELPH-IMP: 40.9 MG/DL (ref 15–45)
PROT SERPL-MCNC: 10.7 G/DL (ref 6.4–8.2)
PROT UR-MCNC: >=300 MG/DL
RBC # CSF: 3 /CUMM (ref ?–1)
RBC # CSF: 8 /CUMM (ref ?–1)
SARS-COV-2 RNA RESP QL NAA+PROBE: NOT DETECTED
SODIUM SERPL-SCNC: 136 MMOL/L (ref 136–145)
SP GR UR STRIP: 1.02 (ref 1–1.03)
TOTAL CELLS COUNTED CSF: 1 /CUMM (ref 0–5)
TOTAL VOLUME CSF: 4.5 ML
TSI SER-ACNC: 0.99 MIU/ML (ref 0.36–3.74)
TUBE # CSF: 4
TURBIDITY CSF QL: CLEAR
UROBILINOGEN UR STRIP-ACNC: 0.2
VIT B12 SERPL-MCNC: >2000 PG/ML (ref 193–986)

## 2022-11-27 PROCEDURE — 99223 1ST HOSP IP/OBS HIGH 75: CPT | Performed by: HOSPITALIST

## 2022-11-27 PROCEDURE — 99223 1ST HOSP IP/OBS HIGH 75: CPT | Performed by: OTHER

## 2022-11-27 PROCEDURE — 70450 CT HEAD/BRAIN W/O DYE: CPT

## 2022-11-27 PROCEDURE — 71045 X-RAY EXAM CHEST 1 VIEW: CPT | Performed by: EMERGENCY MEDICINE

## 2022-11-27 RX ORDER — MORPHINE SULFATE 2 MG/ML
1 INJECTION, SOLUTION INTRAMUSCULAR; INTRAVENOUS EVERY 2 HOUR PRN
Status: DISCONTINUED | OUTPATIENT
Start: 2022-11-27 | End: 2022-11-30

## 2022-11-27 RX ORDER — LEVETIRACETAM 500 MG/5ML
500 INJECTION, SOLUTION, CONCENTRATE INTRAVENOUS EVERY 12 HOURS
Status: DISCONTINUED | OUTPATIENT
Start: 2022-11-28 | End: 2022-11-28

## 2022-11-27 RX ORDER — LEVETIRACETAM 500 MG/5ML
1000 INJECTION, SOLUTION, CONCENTRATE INTRAVENOUS ONCE
Status: COMPLETED | OUTPATIENT
Start: 2022-11-27 | End: 2022-11-27

## 2022-11-27 RX ORDER — MORPHINE SULFATE 2 MG/ML
2 INJECTION, SOLUTION INTRAMUSCULAR; INTRAVENOUS EVERY 2 HOUR PRN
Status: DISCONTINUED | OUTPATIENT
Start: 2022-11-27 | End: 2022-11-30

## 2022-11-27 RX ORDER — LORAZEPAM 2 MG/ML
1 INJECTION INTRAMUSCULAR EVERY 10 MIN PRN
Status: DISCONTINUED | OUTPATIENT
Start: 2022-11-27 | End: 2022-11-30

## 2022-11-27 RX ORDER — LORAZEPAM 2 MG/ML
INJECTION INTRAMUSCULAR
Status: COMPLETED
Start: 2022-11-27 | End: 2022-11-27

## 2022-11-27 RX ORDER — MORPHINE SULFATE 4 MG/ML
4 INJECTION, SOLUTION INTRAMUSCULAR; INTRAVENOUS EVERY 2 HOUR PRN
Status: DISCONTINUED | OUTPATIENT
Start: 2022-11-27 | End: 2022-11-30

## 2022-11-27 RX ORDER — ONDANSETRON 2 MG/ML
4 INJECTION INTRAMUSCULAR; INTRAVENOUS EVERY 6 HOURS PRN
Status: DISCONTINUED | OUTPATIENT
Start: 2022-11-27 | End: 2022-11-30

## 2022-11-27 RX ORDER — HEPARIN SODIUM 5000 [USP'U]/ML
5000 INJECTION, SOLUTION INTRAVENOUS; SUBCUTANEOUS EVERY 8 HOURS SCHEDULED
Status: DISCONTINUED | OUTPATIENT
Start: 2022-11-28 | End: 2022-11-30

## 2022-11-27 RX ORDER — METOCLOPRAMIDE HYDROCHLORIDE 5 MG/ML
5 INJECTION INTRAMUSCULAR; INTRAVENOUS EVERY 8 HOURS PRN
Status: DISCONTINUED | OUTPATIENT
Start: 2022-11-27 | End: 2022-11-30

## 2022-11-27 RX ORDER — LABETALOL HYDROCHLORIDE 5 MG/ML
20 INJECTION, SOLUTION INTRAVENOUS ONCE
Status: COMPLETED | OUTPATIENT
Start: 2022-11-27 | End: 2022-11-27

## 2022-11-27 RX ORDER — LABETALOL HYDROCHLORIDE 5 MG/ML
INJECTION, SOLUTION INTRAVENOUS
Status: COMPLETED
Start: 2022-11-27 | End: 2022-11-27

## 2022-11-27 RX ORDER — LORAZEPAM 2 MG/ML
1 INJECTION INTRAMUSCULAR ONCE
Status: COMPLETED | OUTPATIENT
Start: 2022-11-27 | End: 2022-11-27

## 2022-11-27 RX ORDER — LABETALOL HYDROCHLORIDE 5 MG/ML
10 INJECTION, SOLUTION INTRAVENOUS EVERY 6 HOURS PRN
Status: DISCONTINUED | OUTPATIENT
Start: 2022-11-27 | End: 2022-11-30

## 2022-11-27 RX ORDER — LORAZEPAM 2 MG/ML
2 INJECTION INTRAMUSCULAR ONCE
Status: COMPLETED | OUTPATIENT
Start: 2022-11-27 | End: 2022-11-27

## 2022-11-27 RX ORDER — SODIUM CHLORIDE 9 MG/ML
INJECTION, SOLUTION INTRAVENOUS CONTINUOUS
Status: DISCONTINUED | OUTPATIENT
Start: 2022-11-27 | End: 2022-11-28

## 2022-11-27 NOTE — ED QUICK NOTES
Pt transported to MRI area, too irritable to be moved to MRI table. Patient waking approx q 15 seconds attempting to sit upright. Verbal redirection minimally successful.

## 2022-11-28 ENCOUNTER — APPOINTMENT (OUTPATIENT)
Dept: CV DIAGNOSTICS | Facility: HOSPITAL | Age: 77
End: 2022-11-28
Attending: INTERNAL MEDICINE
Payer: MEDICARE

## 2022-11-28 LAB
ALBUMIN SERPL-MCNC: 3.6 G/DL (ref 3.4–5)
ALBUMIN/GLOB SERPL: 0.8 {RATIO} (ref 1–2)
ALP LIVER SERPL-CCNC: 95 U/L
ALT SERPL-CCNC: 12 U/L
ANION GAP SERPL CALC-SCNC: 10 MMOL/L (ref 0–18)
AST SERPL-CCNC: 14 U/L (ref 15–37)
ATRIAL RATE: 220 BPM
ATRIAL RATE: 227 BPM
BASOPHILS # BLD AUTO: 0.04 X10(3) UL (ref 0–0.2)
BASOPHILS # BLD AUTO: 0.06 X10(3) UL (ref 0–0.2)
BASOPHILS NFR BLD AUTO: 0.5 %
BASOPHILS NFR BLD AUTO: 0.6 %
BILIRUB SERPL-MCNC: 0.6 MG/DL (ref 0.1–2)
BUN BLD-MCNC: 10 MG/DL (ref 7–18)
BUN/CREAT SERPL: 11.1 (ref 10–20)
CALCIUM BLD-MCNC: 8.8 MG/DL (ref 8.5–10.1)
CHLORIDE SERPL-SCNC: 106 MMOL/L (ref 98–112)
CO2 SERPL-SCNC: 25 MMOL/L (ref 21–32)
CREAT BLD-MCNC: 0.9 MG/DL
DEPRECATED RDW RBC AUTO: 46.6 FL (ref 35.1–46.3)
DEPRECATED RDW RBC AUTO: 47.1 FL (ref 35.1–46.3)
EOSINOPHIL # BLD AUTO: 0.02 X10(3) UL (ref 0–0.7)
EOSINOPHIL # BLD AUTO: 0.02 X10(3) UL (ref 0–0.7)
EOSINOPHIL NFR BLD AUTO: 0.2 %
EOSINOPHIL NFR BLD AUTO: 0.3 %
ERYTHROCYTE [DISTWIDTH] IN BLOOD BY AUTOMATED COUNT: 14.1 % (ref 11–15)
ERYTHROCYTE [DISTWIDTH] IN BLOOD BY AUTOMATED COUNT: 14.3 % (ref 11–15)
EST. AVERAGE GLUCOSE BLD GHB EST-MCNC: 123 MG/DL (ref 68–126)
GFR SERPLBLD BASED ON 1.73 SQ M-ARVRAT: 66 ML/MIN/1.73M2 (ref 60–?)
GLOBULIN PLAS-MCNC: 4.8 G/DL (ref 2.8–4.4)
GLUCOSE BLD-MCNC: 101 MG/DL (ref 70–99)
GLUCOSE BLDC GLUCOMTR-MCNC: 101 MG/DL (ref 70–99)
GLUCOSE BLDC GLUCOMTR-MCNC: 111 MG/DL (ref 70–99)
GLUCOSE BLDC GLUCOMTR-MCNC: 88 MG/DL (ref 70–99)
GLUCOSE BLDC GLUCOMTR-MCNC: 95 MG/DL (ref 70–99)
HBA1C MFR BLD: 5.9 % (ref ?–5.7)
HCT VFR BLD AUTO: 40.6 %
HCT VFR BLD AUTO: 47.8 %
HGB BLD-MCNC: 12.7 G/DL
HGB BLD-MCNC: 14.4 G/DL
IMM GRANULOCYTES # BLD AUTO: 0.01 X10(3) UL (ref 0–1)
IMM GRANULOCYTES # BLD AUTO: 0.05 X10(3) UL (ref 0–1)
IMM GRANULOCYTES NFR BLD: 0.1 %
IMM GRANULOCYTES NFR BLD: 0.5 %
INR BLD: 1.21 (ref 0.85–1.16)
LYMPHOCYTES # BLD AUTO: 2.05 X10(3) UL (ref 1–4)
LYMPHOCYTES # BLD AUTO: 5.16 X10(3) UL (ref 1–4)
LYMPHOCYTES NFR BLD AUTO: 26.9 %
LYMPHOCYTES NFR BLD AUTO: 52.7 %
MAGNESIUM SERPL-MCNC: 1.8 MG/DL (ref 1.6–2.6)
MCH RBC QN AUTO: 28.1 PG (ref 26–34)
MCH RBC QN AUTO: 28.5 PG (ref 26–34)
MCHC RBC AUTO-ENTMCNC: 30.1 G/DL (ref 31–37)
MCHC RBC AUTO-ENTMCNC: 31.3 G/DL (ref 31–37)
MCV RBC AUTO: 91.2 FL
MCV RBC AUTO: 93.2 FL
MONOCYTES # BLD AUTO: 0.55 X10(3) UL (ref 0.1–1)
MONOCYTES # BLD AUTO: 0.79 X10(3) UL (ref 0.1–1)
MONOCYTES NFR BLD AUTO: 10.4 %
MONOCYTES NFR BLD AUTO: 5.6 %
NEUTROPHILS # BLD AUTO: 3.95 X10 (3) UL (ref 1.5–7.7)
NEUTROPHILS # BLD AUTO: 3.95 X10(3) UL (ref 1.5–7.7)
NEUTROPHILS # BLD AUTO: 4.71 X10 (3) UL (ref 1.5–7.7)
NEUTROPHILS # BLD AUTO: 4.71 X10(3) UL (ref 1.5–7.7)
NEUTROPHILS NFR BLD AUTO: 40.4 %
NEUTROPHILS NFR BLD AUTO: 61.8 %
OSMOLALITY SERPL CALC.SUM OF ELEC: 291 MOSM/KG (ref 275–295)
P AXIS: 57 DEGREES
P AXIS: 86 DEGREES
PLATELET # BLD AUTO: 263 10(3)UL (ref 150–450)
PLATELET # BLD AUTO: 310 10(3)UL (ref 150–450)
POTASSIUM SERPL-SCNC: 3.1 MMOL/L (ref 3.5–5.1)
POTASSIUM SERPL-SCNC: 3.6 MMOL/L (ref 3.5–5.1)
PROT SERPL-MCNC: 8.4 G/DL (ref 6.4–8.2)
PROTHROMBIN TIME: 15.2 SECONDS (ref 11.6–14.8)
Q-T INTERVAL: 356 MS
Q-T INTERVAL: 416 MS
QRS DURATION: 100 MS
QRS DURATION: 92 MS
QTC CALCULATION (BEZET): 479 MS
QTC CALCULATION (BEZET): 506 MS
R AXIS: 13 DEGREES
R AXIS: 81 DEGREES
RBC # BLD AUTO: 4.45 X10(6)UL
RBC # BLD AUTO: 5.13 X10(6)UL
SODIUM SERPL-SCNC: 141 MMOL/L (ref 136–145)
T AXIS: 98 DEGREES
T AXIS: 98 DEGREES
VENTRICULAR RATE: 109 BPM
VENTRICULAR RATE: 89 BPM
WBC # BLD AUTO: 7.6 X10(3) UL (ref 4–11)
WBC # BLD AUTO: 9.8 X10(3) UL (ref 4–11)

## 2022-11-28 PROCEDURE — 95816 EEG AWAKE AND DROWSY: CPT | Performed by: OTHER

## 2022-11-28 PROCEDURE — 93306 TTE W/DOPPLER COMPLETE: CPT | Performed by: INTERNAL MEDICINE

## 2022-11-28 PROCEDURE — 99233 SBSQ HOSP IP/OBS HIGH 50: CPT | Performed by: HOSPITALIST

## 2022-11-28 PROCEDURE — 99232 SBSQ HOSP IP/OBS MODERATE 35: CPT | Performed by: OTHER

## 2022-11-28 RX ORDER — POTASSIUM CHLORIDE 14.9 MG/ML
20 INJECTION INTRAVENOUS ONCE
Status: COMPLETED | OUTPATIENT
Start: 2022-11-28 | End: 2022-11-28

## 2022-11-28 RX ORDER — CARVEDILOL 12.5 MG/1
12.5 TABLET ORAL 2 TIMES DAILY
Status: DISCONTINUED | OUTPATIENT
Start: 2022-11-28 | End: 2022-11-30

## 2022-11-28 RX ORDER — ACETAMINOPHEN 325 MG/1
650 TABLET ORAL EVERY 6 HOURS PRN
Status: DISCONTINUED | OUTPATIENT
Start: 2022-11-28 | End: 2022-11-30

## 2022-11-28 RX ORDER — MAGNESIUM SULFATE HEPTAHYDRATE 40 MG/ML
2 INJECTION, SOLUTION INTRAVENOUS ONCE
Status: COMPLETED | OUTPATIENT
Start: 2022-11-28 | End: 2022-11-28

## 2022-11-28 RX ORDER — LEVETIRACETAM 500 MG/1
500 TABLET ORAL 2 TIMES DAILY
Status: DISCONTINUED | OUTPATIENT
Start: 2022-11-28 | End: 2022-11-30

## 2022-11-28 NOTE — CM/SW NOTE
Department  notified of request for marie LINDSEY referrals started. Assigned CM/SW to follow up with pt/family on further discharge planning.      Sukhi Sears   November 28, 2022   14:38

## 2022-11-28 NOTE — CONSULTS
[facility]    PATIENT'S NAME: Missael Keyes   ATTENDING PHYSICIAN: Deon Batres MD   CONSULTING PHYSICIAN: Bisi Duggan MD   PATIENT ACCOUNT#:   [id number]    LOCATION:  [address] [place] [address] #:   [id number]       YOB: 1945  ADMISSION DATE:       11/27/2022      CONSULT DATE:  11/27/2022    REPORT OF CONSULTATION      HISTORY OF PRESENT ILLNESS:  History is obtained by speaking with Dr. Ginna Maza over the telephone. I also subsequently spoke with the , grandson at bedside. They relate she has had a headache for a couple of days, nausea and emesis yesterday, and then became confused this morning. Had a seizure in the ER. No prior history of seizure or stroke.  relates no recent upper respiratory tract infection symptoms, viral syndrome. Dr. Ginna Maza performed a spinal tap. I asked Dr. Ginna Maza to obtain encephalitis, meningitis panel and to start IV Rocephin, IV acyclovir pending results of spinal fluid. Patient at the present time not able to provide any history. Pharmacist is managing IV acyclovir. She apparently had COVID 1 month ago. ALLERGIES:  No known allergies. SOCIAL HISTORY:  She does not smoke, drink alcohol, or use any illegal drugs. PHYSICAL EXAMINATION:    VITAL SIGNS:  As recorded in the chart. Temperature 97.6, pulse 88, respiratory rate 20, blood pressure 145/102, pulse ox 98%. NEUROLOGIC:  Patient not able to provide any history. No nuchal rigidity. Pupils react to light. Visual fields are full to threat. No facial asymmetry. Spontaneously to noxious stimuli moves arms and legs. No Babinski sign. LABORATORY DATA:  Spinal fluid cell count, protein, glucose are normal.  Spinal fluid culture is negative. WBC 9.8. MRI of the brain is pending. CT of the brain report reviewed. IMPRESSION:  Encephalopathy.     I would recommend continuing IV acyclovir, IV Rocephin until spinal fluid results are back. MRI of the brain is pending. EEG is pending. I will also order thyroid functions, ammonia level, B12 level. Thank you for the consult.     Dictated By Alla Sy MD  d: 11/27/2022 20:55:00  t: 11/28/2022 00:17:57  Job [id number]  DHL/    cc: Faith Yoon MD

## 2022-11-28 NOTE — PROGRESS NOTES
[facility] Pharmacy Note:  Renal Adjustment for acyclovir (ZOVIRAX)    Kaitlin James is a 69year old patient who has been prescribed acyclovir (ZOVIRAX) 600 mg every 8 hrs. The estimated creatinine clearance is 37.4 mL/min (A) (based on SCr of 1.13 mg/dL (H)). The dose has been adjusted to acyclovir (ZOVIRAX) 600 mg every 12 hrs per hospital renal dose adjustment protocol for treatment of CNS infection. Pharmacy will follow and adjust dose as warranted for additional renal function changes.     Thank you,    Jn Mclain, PharmD  11/27/2022  8:42 PM

## 2022-11-28 NOTE — CM/SW NOTE
SW initiated self referral for DC planning. SW attempted to meet with pt at bedside, pt asleep. SW called and left VM for spouse on home and mobile devices listed. Per chart review:  Patient is a 69year old female admitted 11/27/2022 for AMS and seizure. At baseline, pt lives in one level home with spouse and is independent. She has hx of severe RA that affects her knees, feet, hands and shoulders. She does note this makes walking challenging. She is ambulatory without device at baseline. Per PT 11/28: Pt is significantly declined from her baseline level of mobility and would not be safe to return home with spouse at present functional level. PT/OT rec [facility]SAR. C[facility]AR referrals sent. PASRR completed. PLAN: pending medical course & ins auth - C[facility]SAR  Need:  - discuss DC planning with pt/spouse  - list/choice/auth    SW remains available for support and/or discharge planning. Please do not hesitate to call/chat SW if further DC needs arise.      Mike Chance MSW, [place], [place]   Ext [phone]

## 2022-11-28 NOTE — PROGRESS NOTES
[facility] Pharmacy Note:  Renal Adjustment for ceftriaxone (ROCEPHIN)    Silas Pavon is a 69year old patient who has been prescribed ceftriaxone (ROCEPHIN) 1gm every 24 hrs. The estimated creatinine clearance is 37.4 mL/min (A) (based on SCr of 1.13 mg/dL (H)). The dose has been adjusted to ceftriaxone (ROCEPHIN) 2gm every 12 hrs per hospital renal dose adjustment protocol for treatment of  Meningitis . Pharmacy will follow and adjust dose as warranted for additional renal function changes.     Thank you,    Jace Dorsey, PharmD  11/27/2022  8:40 PM

## 2022-11-28 NOTE — ED QUICK NOTES
Report given to [facility] on unit ICU for bed 226-A. Patient AOx0 at time of transfer. Orientation unchanged from time of arrival to this ER. Vital signs stable.

## 2022-11-28 NOTE — ED QUICK NOTES
Orders for admission, patient is aware of plan and ready to go upstairs. Any questions, please call ED RN Edita Burrell at [address].      Patient Covid vaccination status: Fully vaccinated     COVID Test Ordered in ED: Rapid SARS-CoV-2 by PCR    COVID Suspicion at Admission: N/A    Running Infusions:  see MAR    Mental Status/LOC at time of transport: responsive to pain    Other pertinent information: Patient's , Dolphus Carrel, [id number]    CSF sent for R/O Meningitis    CIWA score: N/A   NIH score:  N/A

## 2022-11-28 NOTE — ED QUICK NOTES
Patient's  at bedside, update on patient status and plan of care, admission room number 226. He reports that the patient Junaid Mongemb an MRI at this hospital about a month ago\".

## 2022-11-28 NOTE — CONSULTS
Consult dictated. History obtained by speaking with  and grandson at bedside. Headache for couple days. Nausea, emesis. Confusion started this morning. Had a seizure in the ER. No prior history of stroke or seizure. Preliminary spinal fluid reveals no evidence for CNS infection. Spoke with Dr. Donny Rader and asked for encephalitis, meningitis panel, IV Rocephin, IV acyclovir. MRI of the brain is pending. EEG is pending. Continue Keppra. Please see dictation.

## 2022-11-28 NOTE — PROGRESS NOTES
[facility] Pharmacy Note:  Renal Dose Adjustment for Metoclopramide (REGLAN)    Galdino Dale has been prescribed Metoclopramide (REGLAN) 10 mg every 8 hours as needed for nausea/vomiting,. Estimated Creatinine Clearance: 37.4 mL/min (A) (based on SCr of 1.13 mg/dL (H)). Calculated creatinine clearance is < 40 ml/min, therefore, the dose of Metoclopramide (REGLAN) has been changed to 5 mg every 8 hours as needed for nausea/vomiting per P&T approved protocol. Pharmacy will continue to follow, and if renal function improves, will resume the original order.        Thank you,  Amilcar Santos, PharmD  11/27/2022 8:38 PM

## 2022-11-28 NOTE — PLAN OF CARE
Patient alert and oriented x4, intermittent confusion. Mag and K replaced. Bedside echo and EEG. Elevated BP, PRN labetalol, APN restarted home medications. PT/OT/SLP evaluations. Diet ordered.  bedside, updated on patient status and plan of care. Orders to transfer. Patient resting in bed, frequent nursing rounds made. Problem: Patient Centered Care  Goal: Patient preferences are identified and integrated in the patient's plan of care  Description: Interventions:  - What would you like us to know as we care for you? I am   - Provide timely, complete, and accurate information to patient/family  - Incorporate patient and family knowledge, values, beliefs, and cultural backgrounds into the planning and delivery of care  - Encourage patient/family to participate in care and decision-making at the level they choose  - Honor patient and family perspectives and choices  Outcome: Progressing     Problem: Patient/Family Goals  Goal: Patient/Family Long Term Goal  Description: Patient's Long Term Goal: to go home    Interventions:  - interdisciplinary care  - See additional Care Plan goals for specific interventions  Outcome: Progressing  Goal: Patient/Family Short Term Goal  Description: Patient's Short Term Goal: minimize seizure risk    Interventions:   - frequent assessments and medication  - See additional Care Plan goals for specific interventions  Outcome: Progressing     Problem: NEUROLOGICAL - ADULT  Goal: Achieves stable or improved neurological status  Description: INTERVENTIONS  - Assess for and report changes in neurological status  - Initiate measures to prevent increased intracranial pressure  - Maintain blood pressure and fluid volume within ordered parameters to optimize cerebral perfusion and minimize risk of hemorrhage  - Monitor temperature, glucose, and sodium.  Initiate appropriate interventions as ordered  Outcome: Progressing  Goal: Absence of seizures  Description: INTERVENTIONS  - Monitor for seizure activity  - Administer anti-seizure medications as ordered  - Monitor neurological status  Outcome: Progressing     Problem: Impaired Functional Mobility  Goal: Achieve highest/safest level of mobility/gait  Description: Interventions:  - Assess patient's functional ability and stability  - Promote increasing activity/tolerance for mobility and gait  - Educate and engage patient/family in tolerated activity level and precautions  Outcome: Progressing

## 2022-11-29 LAB
ALBUMIN SERPL-MCNC: 3.7 G/DL (ref 3.4–5)
ANION GAP SERPL CALC-SCNC: 8 MMOL/L (ref 0–18)
BASOPHILS # BLD AUTO: 0.06 X10(3) UL (ref 0–0.2)
BASOPHILS NFR BLD AUTO: 1 %
BUN BLD-MCNC: 10 MG/DL (ref 7–18)
BUN/CREAT SERPL: 11.1 (ref 10–20)
C DIFF TOX B STL QL: NEGATIVE
CALCIUM BLD-MCNC: 9.9 MG/DL (ref 8.5–10.1)
CHLORIDE SERPL-SCNC: 109 MMOL/L (ref 98–112)
CO2 SERPL-SCNC: 24 MMOL/L (ref 21–32)
CREAT BLD-MCNC: 0.9 MG/DL
DEPRECATED RDW RBC AUTO: 49 FL (ref 35.1–46.3)
EOSINOPHIL # BLD AUTO: 0.07 X10(3) UL (ref 0–0.7)
EOSINOPHIL NFR BLD AUTO: 1.1 %
ERYTHROCYTE [DISTWIDTH] IN BLOOD BY AUTOMATED COUNT: 14.9 % (ref 11–15)
GFR SERPLBLD BASED ON 1.73 SQ M-ARVRAT: 66 ML/MIN/1.73M2 (ref 60–?)
GLUCOSE BLD-MCNC: 93 MG/DL (ref 70–99)
GLUCOSE BLDC GLUCOMTR-MCNC: 132 MG/DL (ref 70–99)
GLUCOSE BLDC GLUCOMTR-MCNC: 82 MG/DL (ref 70–99)
GLUCOSE BLDC GLUCOMTR-MCNC: 94 MG/DL (ref 70–99)
GLUCOSE BLDC GLUCOMTR-MCNC: 94 MG/DL (ref 70–99)
GLUCOSE BLDC GLUCOMTR-MCNC: 99 MG/DL (ref 70–99)
HCT VFR BLD AUTO: 39.9 %
HGB BLD-MCNC: 12.6 G/DL
IMM GRANULOCYTES # BLD AUTO: 0.02 X10(3) UL (ref 0–1)
IMM GRANULOCYTES NFR BLD: 0.3 %
LYMPHOCYTES # BLD AUTO: 2.18 X10(3) UL (ref 1–4)
LYMPHOCYTES NFR BLD AUTO: 35.4 %
MAGNESIUM SERPL-MCNC: 2.4 MG/DL (ref 1.6–2.6)
MCH RBC QN AUTO: 28.8 PG (ref 26–34)
MCHC RBC AUTO-ENTMCNC: 31.6 G/DL (ref 31–37)
MCV RBC AUTO: 91.1 FL
MONOCYTES # BLD AUTO: 0.64 X10(3) UL (ref 0.1–1)
MONOCYTES NFR BLD AUTO: 10.4 %
NEUTROPHILS # BLD AUTO: 3.18 X10 (3) UL (ref 1.5–7.7)
NEUTROPHILS # BLD AUTO: 3.18 X10(3) UL (ref 1.5–7.7)
NEUTROPHILS NFR BLD AUTO: 51.8 %
OSMOLALITY SERPL CALC.SUM OF ELEC: 291 MOSM/KG (ref 275–295)
PHOSPHATE SERPL-MCNC: 2.6 MG/DL (ref 2.5–4.9)
PLATELET # BLD AUTO: 222 10(3)UL (ref 150–450)
POTASSIUM SERPL-SCNC: 3.3 MMOL/L (ref 3.5–5.1)
POTASSIUM SERPL-SCNC: 4.7 MMOL/L (ref 3.5–5.1)
RBC # BLD AUTO: 4.38 X10(6)UL
SODIUM SERPL-SCNC: 141 MMOL/L (ref 136–145)
WBC # BLD AUTO: 6.2 X10(3) UL (ref 4–11)

## 2022-11-29 PROCEDURE — 99233 SBSQ HOSP IP/OBS HIGH 50: CPT | Performed by: HOSPITALIST

## 2022-11-29 PROCEDURE — 99232 SBSQ HOSP IP/OBS MODERATE 35: CPT | Performed by: OTHER

## 2022-11-29 RX ORDER — ALENDRONATE SODIUM 70 MG/1
70 TABLET ORAL
Status: DISCONTINUED | OUTPATIENT
Start: 2022-11-30 | End: 2022-11-30

## 2022-11-29 RX ORDER — MELATONIN
800 DAILY
Status: DISCONTINUED | OUTPATIENT
Start: 2022-11-29 | End: 2022-11-30

## 2022-11-29 RX ORDER — POTASSIUM CHLORIDE 1.5 G/1.77G
40 POWDER, FOR SOLUTION ORAL EVERY 4 HOURS
Status: COMPLETED | OUTPATIENT
Start: 2022-11-29 | End: 2022-11-29

## 2022-11-29 RX ORDER — AMLODIPINE BESYLATE 5 MG/1
5 TABLET ORAL DAILY
Status: DISCONTINUED | OUTPATIENT
Start: 2022-11-29 | End: 2022-11-29

## 2022-11-29 RX ORDER — FUROSEMIDE 40 MG/1
40 TABLET ORAL DAILY
Status: DISCONTINUED | OUTPATIENT
Start: 2022-11-29 | End: 2022-11-30

## 2022-11-29 RX ORDER — ISOSORBIDE MONONITRATE 30 MG/1
30 TABLET, EXTENDED RELEASE ORAL DAILY
Status: DISCONTINUED | OUTPATIENT
Start: 2022-11-29 | End: 2022-11-30

## 2022-11-29 NOTE — PLAN OF CARE
Seizure precaution maintained- no seizure activity noted. Tylenol for headache with relief. Plan: CÉSAR- pending med. clearance. Problem: Patient Centered Care  Goal: Patient preferences are identified and integrated in the patient's plan of care  Description: Interventions:  - What would you like us to know as we care for you? \" I live at home with my   and grandkids\". - Provide timely, complete, and accurate information to patient/family  - Incorporate patient and family knowledge, values, beliefs, and cultural backgrounds into the planning and delivery of care  - Encourage patient/family to participate in care and decision-making at the level they choose  - Honor patient and family perspectives and choices  Outcome: Progressing     Problem: Patient/Family Goals  Goal: Patient/Family Long Term Goal  Description: Patient's Long Term Goal: to go home    Interventions:  - interdisciplinary care  - See additional Care Plan goals for specific interventions  Outcome: Progressing  Goal: Patient/Family Short Term Goal  Description: Patient's Short Term Goal: minimize seizure risk    Interventions:   - frequent assessments and medication  - See additional Care Plan goals for specific interventions  Outcome: Progressing     Problem: NEUROLOGICAL - ADULT  Goal: Achieves stable or improved neurological status  Description: INTERVENTIONS  - Assess for and report changes in neurological status  - Initiate measures to prevent increased intracranial pressure  - Maintain blood pressure and fluid volume within ordered parameters to optimize cerebral perfusion and minimize risk of hemorrhage  - Monitor temperature, glucose, and sodium.  Initiate appropriate interventions as ordered  Outcome: Progressing  Goal: Absence of seizures  Description: INTERVENTIONS  - Monitor for seizure activity  - Administer anti-seizure medications as ordered  - Monitor neurological status  Outcome: Progressing     Problem: Impaired Functional Mobility  Goal: Achieve highest/safest level of mobility/gait  Description: Interventions:  - Assess patient's functional ability and stability  - Promote increasing activity/tolerance for mobility and gait  - Educate and engage patient/family in tolerated activity level and precautions    Outcome: Progressing     Problem: Diabetes/Glucose Control  Goal: Glucose maintained within prescribed range  Description: INTERVENTIONS:  - Monitor Blood Glucose as ordered  - Assess for signs and symptoms of hyperglycemia and hypoglycemia  - Administer ordered medications to maintain glucose within target range  - Assess barriers to adequate nutritional intake and initiate nutrition consult as needed  - Instruct patient on self management of diabetes  Outcome: Progressing     Problem: SAFETY ADULT - FALL  Goal: Free from fall injury  Description: INTERVENTIONS:  - Assess pt frequently for physical needs  - Identify cognitive and physical deficits and behaviors that affect risk of falls.   - [place] fall precautions as indicated by assessment.  - Educate pt/family on patient safety including physical limitations  - Instruct pt to call for assistance with activity based on assessment  - Modify environment to reduce risk of injury  - Provide assistive devices as appropriate  - Consider OT/PT consult to assist with strengthening/mobility  - Encourage toileting schedule  Outcome: Progressing     Problem: DISCHARGE PLANNING  Goal: Discharge to home or other facility with appropriate resources  Description: INTERVENTIONS:  - Identify barriers to discharge w/pt and caregiver  - Include patient/family/discharge partner in discharge planning  - Arrange for needed discharge resources and transportation as appropriate  - Identify discharge learning needs (meds, wound care, etc)  - Arrange for interpreters to assist at discharge as needed  - Consider post-discharge preferences of patient/family/discharge partner  - Complete POLST form as appropriate  - Assess patient's ability to be responsible for managing their own health  - Refer to Case Management Department for coordinating discharge planning if the patient needs post-hospital services based on physician/LIP order or complex needs related to functional status, cognitive ability or social support system  Outcome: Progressing

## 2022-11-29 NOTE — PLAN OF CARE
Patient's blood pressure now 127/84 with heart rate of 89. Patient with multiple watery stools, sample sent. Patient walks to bathroom with stand by assistance. Per MRI department patient's MRI should be done sometime tonight. Per Dr. Morena Bueno for ativan to be given so patient can proceed with MRI. Medications given per MAR. Will continue to monitor and follow plan of care. Problem: Patient Centered Care  Goal: Patient preferences are identified and integrated in the patient's plan of care  Description: Interventions:  - What would you like us to know as we care for you? I am   - Provide timely, complete, and accurate information to patient/family  - Incorporate patient and family knowledge, values, beliefs, and cultural backgrounds into the planning and delivery of care  - Encourage patient/family to participate in care and decision-making at the level they choose  - Honor patient and family perspectives and choices  Outcome: Progressing     Problem: Patient/Family Goals  Goal: Patient/Family Long Term Goal  Description: Patient's Long Term Goal: to go home    Interventions:  - interdisciplinary care  - See additional Care Plan goals for specific interventions  Outcome: Progressing  Goal: Patient/Family Short Term Goal  Description: Patient's Short Term Goal: minimize seizure risk    Interventions:   - frequent assessments and medication  - See additional Care Plan goals for specific interventions  Outcome: Progressing     Problem: NEUROLOGICAL - ADULT  Goal: Achieves stable or improved neurological status  Description: INTERVENTIONS  - Assess for and report changes in neurological status  - Initiate measures to prevent increased intracranial pressure  - Maintain blood pressure and fluid volume within ordered parameters to optimize cerebral perfusion and minimize risk of hemorrhage  - Monitor temperature, glucose, and sodium.  Initiate appropriate interventions as ordered  Outcome: Progressing  Goal: Absence of seizures  Description: INTERVENTIONS  - Monitor for seizure activity  - Administer anti-seizure medications as ordered  - Monitor neurological status  Outcome: Progressing     Problem: Impaired Functional Mobility  Goal: Achieve highest/safest level of mobility/gait  Description: Interventions:  - Assess patient's functional ability and stability  - Promote increasing activity/tolerance for mobility and gait  - Educate and engage patient/family in tolerated activity level and precautions  - Recommend use of  RW for transfers and ambulation  Outcome: Progressing     Problem: Diabetes/Glucose Control  Goal: Glucose maintained within prescribed range  Description: INTERVENTIONS:  - Monitor Blood Glucose as ordered  - Assess for signs and symptoms of hyperglycemia and hypoglycemia  - Administer ordered medications to maintain glucose within target range  - Assess barriers to adequate nutritional intake and initiate nutrition consult as needed  - Instruct patient on self management of diabetes  Outcome: Progressing     Problem: SAFETY ADULT - FALL  Goal: Free from fall injury  Description: INTERVENTIONS:  - Assess pt frequently for physical needs  - Identify cognitive and physical deficits and behaviors that affect risk of falls.   - [place] fall precautions as indicated by assessment.  - Educate pt/family on patient safety including physical limitations  - Instruct pt to call for assistance with activity based on assessment  - Modify environment to reduce risk of injury  - Provide assistive devices as appropriate  - Consider OT/PT consult to assist with strengthening/mobility  - Encourage toileting schedule  Outcome: Progressing     Problem: DISCHARGE PLANNING  Goal: Discharge to home or other facility with appropriate resources  Description: INTERVENTIONS:  - Identify barriers to discharge w/pt and caregiver  - Include patient/family/discharge partner in discharge planning  - Arrange for needed discharge resources and transportation as appropriate  - Identify discharge learning needs (meds, wound care, etc)  - Arrange for interpreters to assist at discharge as needed  - Consider post-discharge preferences of patient/family/discharge partner  - Complete POLST form as appropriate  - Assess patient's ability to be responsible for managing their own health  - Refer to Case Management Department for coordinating discharge planning if the patient needs post-hospital services based on physician/LIP order or complex needs related to functional status, cognitive ability or social support system  Outcome: Progressing     Problem: DISCHARGE PLANNING  Goal: Discharge to home or other facility with appropriate resources  Description: INTERVENTIONS:  - Identify barriers to discharge w/pt and caregiver  - Include patient/family/discharge partner in discharge planning  - Arrange for needed discharge resources and transportation as appropriate  - Identify discharge learning needs (meds, wound care, etc)  - Arrange for interpreters to assist at discharge as needed  - Consider post-discharge preferences of patient/family/discharge partner  - Complete POLST form as appropriate  - Assess patient's ability to be responsible for managing their own health  - Refer to Case Management Department for coordinating discharge planning if the patient needs post-hospital services based on physician/LIP order or complex needs related to functional status, cognitive ability or social support system  Outcome: Progressing

## 2022-11-29 NOTE — CM/SW NOTE
SW obtained CÉSAR list via [facility]. SW met w/ pt and pt's  in her room to discuss. SW explained CÉSAR recommendation and attempted to provide pt w/ list. Pt is ADAMANTLY refusing CÉSAR. [facility] and explained their benefits. Pt is ADAMANT that she does not need any therapy at this time. Pt's  was present for this conversation and did not state other wise. PLAN: Home, declining services      SW/CM to remain available for support and/or discharge planning.          Loren Simpson, OTIS, [facility]

## 2022-11-29 NOTE — PROCEDURES
[address] [facility], [address]      PATIENT'S NAME: Carlos Manuel Александр   ATTENDING PHYSICIAN: Luca Benitez MD   PATIENT ACCOUNT #: [de-identified] LOCATION: [address] [place] [address] #: [id number] YOB: 1945   DATE OF SERVICE: 11/28/2022       ELECTROENCEPHALOGRAM REPORT    DATE OF EXAMINATION:  11/28/2022  AGE: 69 Yrs. SEX: F   EEG #:      INTERPRETATION:  Routine awake and drowsy electroencephalogram using referential and bipolar montages. Background activity consists of predominantly 6-7 Hz theta activity. Drowsiness recorded. No stage 2 sleep. IMPRESSION:  Slight to mild generalized slowing of cerebral activity. No focal slowing. No focal or generalized epileptiform discharges.       Dictated By Zuhair Marie MD  d: 11/28/2022 18:19:02  t: 11/28/2022 18:36:54  [facility] [id number]  L/

## 2022-11-30 ENCOUNTER — APPOINTMENT (OUTPATIENT)
Dept: MRI IMAGING | Facility: HOSPITAL | Age: 77
End: 2022-11-30
Attending: EMERGENCY MEDICINE
Payer: MEDICARE

## 2022-11-30 VITALS
HEIGHT: 64.5 IN | WEIGHT: 146.38 LBS | BODY MASS INDEX: 24.69 KG/M2 | TEMPERATURE: 98 F | OXYGEN SATURATION: 99 % | SYSTOLIC BLOOD PRESSURE: 120 MMHG | HEART RATE: 91 BPM | RESPIRATION RATE: 16 BRPM | DIASTOLIC BLOOD PRESSURE: 83 MMHG

## 2022-11-30 LAB
ALBUMIN SERPL-MCNC: 3.7 G/DL (ref 3.4–5)
ANION GAP SERPL CALC-SCNC: 7 MMOL/L (ref 0–18)
BASOPHILS # BLD AUTO: 0.07 X10(3) UL (ref 0–0.2)
BASOPHILS NFR BLD AUTO: 1 %
BUN BLD-MCNC: 13 MG/DL (ref 7–18)
BUN/CREAT SERPL: 13.7 (ref 10–20)
CALCIUM BLD-MCNC: 10.5 MG/DL (ref 8.5–10.1)
CHLORIDE SERPL-SCNC: 111 MMOL/L (ref 98–112)
CO2 SERPL-SCNC: 20 MMOL/L (ref 21–32)
CREAT BLD-MCNC: 0.95 MG/DL
CRYPTOCOCCUS NEOFORMANS GATTII BY PCR: NOT DETECTED
CYTOMEGALVIRUS BY PCR: NOT DETECTED
DEPRECATED RDW RBC AUTO: 51 FL (ref 35.1–46.3)
ENTEROVIRUS BY PCR: NOT DETECTED
EOSINOPHIL # BLD AUTO: 0.16 X10(3) UL (ref 0–0.7)
EOSINOPHIL NFR BLD AUTO: 2.4 %
ERYTHROCYTE [DISTWIDTH] IN BLOOD BY AUTOMATED COUNT: 14.9 % (ref 11–15)
ESCHERICHIA COLI K1 BY PCR: NOT DETECTED
GFR SERPLBLD BASED ON 1.73 SQ M-ARVRAT: 62 ML/MIN/1.73M2 (ref 60–?)
GLUCOSE BLD-MCNC: 97 MG/DL (ref 70–99)
GLUCOSE BLDC GLUCOMTR-MCNC: 99 MG/DL (ref 70–99)
HAEMOPHILUS INFLUENZAE BY PCR: NOT DETECTED
HCT VFR BLD AUTO: 40.5 %
HERPES SIMPLEX VIRUS 1 BY PCR: NOT DETECTED
HERPES SIMPLEX VIRUS 2 BY PCR: NOT DETECTED
HGB BLD-MCNC: 12.4 G/DL
HUMAN HERPESVIRUS 6 BY PCR: NOT DETECTED
HUMAN PARECHOVIRUS BY PCR: NOT DETECTED
IMM GRANULOCYTES # BLD AUTO: 0.02 X10(3) UL (ref 0–1)
IMM GRANULOCYTES NFR BLD: 0.3 %
LISTERIA MONOCYTOGENES BY PCR: NOT DETECTED
LYMPHOCYTES # BLD AUTO: 2.4 X10(3) UL (ref 1–4)
LYMPHOCYTES NFR BLD AUTO: 35.4 %
MAGNESIUM SERPL-MCNC: 2.1 MG/DL (ref 1.6–2.6)
MCH RBC QN AUTO: 28.7 PG (ref 26–34)
MCHC RBC AUTO-ENTMCNC: 30.6 G/DL (ref 31–37)
MCV RBC AUTO: 93.8 FL
MONOCYTES # BLD AUTO: 0.68 X10(3) UL (ref 0.1–1)
MONOCYTES NFR BLD AUTO: 10 %
NEISSERIA MENINGITIDIS BY PCR: NOT DETECTED
NEUTROPHILS # BLD AUTO: 3.45 X10 (3) UL (ref 1.5–7.7)
NEUTROPHILS # BLD AUTO: 3.45 X10(3) UL (ref 1.5–7.7)
NEUTROPHILS NFR BLD AUTO: 50.9 %
OSMOLALITY SERPL CALC.SUM OF ELEC: 286 MOSM/KG (ref 275–295)
PHOSPHATE SERPL-MCNC: 3.2 MG/DL (ref 2.5–4.9)
PLATELET # BLD AUTO: 231 10(3)UL (ref 150–450)
POTASSIUM SERPL-SCNC: 4.4 MMOL/L (ref 3.5–5.1)
RBC # BLD AUTO: 4.32 X10(6)UL
SODIUM SERPL-SCNC: 138 MMOL/L (ref 136–145)
STREPTOCOCCUS AGALACTIAE BY PCR: NOT DETECTED
STREPTOCOCCUS PNEUMONIAE BY PCR: NOT DETECTED
VARICELLA ZOSTER VIRUS BY PCR: NOT DETECTED
WBC # BLD AUTO: 6.8 X10(3) UL (ref 4–11)

## 2022-11-30 PROCEDURE — 70551 MRI BRAIN STEM W/O DYE: CPT | Performed by: EMERGENCY MEDICINE

## 2022-11-30 PROCEDURE — 99239 HOSP IP/OBS DSCHRG MGMT >30: CPT | Performed by: HOSPITALIST

## 2022-11-30 RX ORDER — LORAZEPAM 2 MG/ML
0.5 INJECTION INTRAMUSCULAR ONCE
Status: COMPLETED | OUTPATIENT
Start: 2022-11-30 | End: 2022-11-30

## 2022-11-30 RX ORDER — ISOSORBIDE MONONITRATE 30 MG/1
30 TABLET, EXTENDED RELEASE ORAL DAILY
Qty: 30 TABLET | Refills: 0 | Status: SHIPPED | OUTPATIENT
Start: 2022-12-01 | End: 2022-11-30

## 2022-11-30 RX ORDER — LEVETIRACETAM 500 MG/1
500 TABLET ORAL 2 TIMES DAILY
Qty: 60 TABLET | Refills: 0 | Status: SHIPPED | OUTPATIENT
Start: 2022-11-30

## 2022-11-30 NOTE — PLAN OF CARE
Problem: Patient Centered Care  Goal: Patient preferences are identified and integrated in the patient's plan of care  Description: Interventions:  - What would you like us to know as we care for you? I am   - Provide timely, complete, and accurate information to patient/family  - Incorporate patient and family knowledge, values, beliefs, and cultural backgrounds into the planning and delivery of care  - Encourage patient/family to participate in care and decision-making at the level they choose  - Honor patient and family perspectives and choices  Outcome: Progressing     Problem: Patient/Family Goals  Goal: Patient/Family Long Term Goal  Description: Patient's Long Term Goal: to go home    Interventions:  - interdisciplinary care  - See additional Care Plan goals for specific interventions  Outcome: Progressing  Goal: Patient/Family Short Term Goal  Description: Patient's Short Term Goal: minimize seizure risk    Interventions:   - frequent assessments and medication  - See additional Care Plan goals for specific interventions  Outcome: Progressing     Problem: NEUROLOGICAL - ADULT  Goal: Achieves stable or improved neurological status  Description: INTERVENTIONS  - Assess for and report changes in neurological status  - Initiate measures to prevent increased intracranial pressure  - Maintain blood pressure and fluid volume within ordered parameters to optimize cerebral perfusion and minimize risk of hemorrhage  - Monitor temperature, glucose, and sodium.  Initiate appropriate interventions as ordered  Outcome: Progressing  Goal: Absence of seizures  Description: INTERVENTIONS  - Monitor for seizure activity  - Administer anti-seizure medications as ordered  - Monitor neurological status  Outcome: Progressing     Problem: Impaired Functional Mobility  Goal: Achieve highest/safest level of mobility/gait  Description: Interventions:  - Assess patient's functional ability and stability  - Promote increasing activity/tolerance for mobility and gait  - Educate and engage patient/family in tolerated activity level and precautions    Outcome: Progressing     Problem: Diabetes/Glucose Control  Goal: Glucose maintained within prescribed range  Description: INTERVENTIONS:  - Monitor Blood Glucose as ordered  - Assess for signs and symptoms of hyperglycemia and hypoglycemia  - Administer ordered medications to maintain glucose within target range  - Assess barriers to adequate nutritional intake and initiate nutrition consult as needed  - Instruct patient on self management of diabetes  Outcome: Progressing     Problem: SAFETY ADULT - FALL  Goal: Free from fall injury  Description: INTERVENTIONS:  - Assess pt frequently for physical needs  - Identify cognitive and physical deficits and behaviors that affect risk of falls.   - [place] fall precautions as indicated by assessment.  - Educate pt/family on patient safety including physical limitations  - Instruct pt to call for assistance with activity based on assessment  - Modify environment to reduce risk of injury  - Provide assistive devices as appropriate  - Consider OT/PT consult to assist with strengthening/mobility  - Encourage toileting schedule  Outcome: Progressing     Problem: DISCHARGE PLANNING  Goal: Discharge to home or other facility with appropriate resources  Description: INTERVENTIONS:  - Identify barriers to discharge w/pt and caregiver  - Include patient/family/discharge partner in discharge planning  - Arrange for needed discharge resources and transportation as appropriate  - Identify discharge learning needs (meds, wound care, etc)  - Arrange for interpreters to assist at discharge as needed  - Consider post-discharge preferences of patient/family/discharge partner  - Complete POLST form as appropriate  - Assess patient's ability to be responsible for managing their own health  - Refer to Case Management Department for coordinating discharge planning if the patient needs post-hospital services based on physician/LIP order or complex needs related to functional status, cognitive ability or social support system  Outcome: Progressing   Pt on RA. Patient to dc if mri is complete. Spoke to MRI 3 times and stated they would try and get her in since it is pending dc. Plan for dc this afternoon.

## 2022-11-30 NOTE — CM/SW NOTE
11/30/22 1400   Discharge disposition   Expected discharge disposition Home or Self   Patient Declines Recommended Services   (declines [facility] and [facility] services.)   Discharge transportation Private car       Holley VELASQUEZN RN Uzma Lopez  RN Case Manager  [phone]

## 2022-12-01 ENCOUNTER — TELEPHONE (OUTPATIENT)
Dept: FAMILY MEDICINE CLINIC | Facility: CLINIC | Age: 77
End: 2022-12-01

## 2022-12-01 ENCOUNTER — PATIENT OUTREACH (OUTPATIENT)
Dept: CASE MANAGEMENT | Age: 77
End: 2022-12-01

## 2022-12-01 DIAGNOSIS — E87.20 LACTIC ACIDOSIS: ICD-10-CM

## 2022-12-01 DIAGNOSIS — R56.9 SEIZURE (HCC): ICD-10-CM

## 2022-12-01 DIAGNOSIS — Z02.9 ENCOUNTERS FOR UNSPECIFIED ADMINISTRATIVE PURPOSE: ICD-10-CM

## 2022-12-01 DIAGNOSIS — R41.82 ALTERED MENTAL STATUS, UNSPECIFIED ALTERED MENTAL STATUS TYPE: ICD-10-CM

## 2022-12-01 PROCEDURE — 1111F DSCHRG MED/CURRENT MED MERGE: CPT

## 2022-12-01 NOTE — TELEPHONE ENCOUNTER
Spoke to patient gave appointment options for 12/12/22 at 1:20 PM OR 1:40 PM TCM, patient refused to make appt. States she cant come to office.

## 2022-12-01 NOTE — PLAN OF CARE
Problem: Patient Centered Care  Goal: Patient preferences are identified and integrated in the patient's plan of care  Description: Interventions:  - What would you like us to know as we care for you? I am   - Provide timely, complete, and accurate information to patient/family  - Incorporate patient and family knowledge, values, beliefs, and cultural backgrounds into the planning and delivery of care  - Encourage patient/family to participate in care and decision-making at the level they choose  - Honor patient and family perspectives and choices  11/30/2022 1843 by Yariel Sutton RN  Outcome: Adequate for Discharge  11/30/2022 1627 by Yariel Sutton RN  Outcome: Progressing     Problem: Patient/Family Goals  Goal: Patient/Family Long Term Goal  Description: Patient's Long Term Goal: to go home    Interventions:  - interdisciplinary care  - See additional Care Plan goals for specific interventions  11/30/2022 1843 by Yariel Sutton RN  Outcome: Adequate for Discharge  11/30/2022 1627 by Yariel Sutton RN  Outcome: Progressing  Goal: Patient/Family Short Term Goal  Description: Patient's Short Term Goal: minimize seizure risk    Interventions:   - frequent assessments and medication  - See additional Care Plan goals for specific interventions  11/30/2022 1843 by Yariel Sutton RN  Outcome: Adequate for Discharge  11/30/2022 1627 by Yariel Sutton RN  Outcome: Progressing     Problem: NEUROLOGICAL - ADULT  Goal: Achieves stable or improved neurological status  Description: INTERVENTIONS  - Assess for and report changes in neurological status  - Initiate measures to prevent increased intracranial pressure  - Maintain blood pressure and fluid volume within ordered parameters to optimize cerebral perfusion and minimize risk of hemorrhage  - Monitor temperature, glucose, and sodium.  Initiate appropriate interventions as ordered  11/30/2022 1843 by Yariel Sutton RN  Outcome: Adequate for Discharge  11/30/2022 1627 by Radha Rowan RN  Outcome: Progressing  Goal: Absence of seizures  Description: INTERVENTIONS  - Monitor for seizure activity  - Administer anti-seizure medications as ordered  - Monitor neurological status  11/30/2022 1843 by Radha Rowan RN  Outcome: Adequate for Discharge  11/30/2022 1627 by Radha Rowan RN  Outcome: Progressing     Problem: Impaired Functional Mobility  Goal: Achieve highest/safest level of mobility/gait  Description: Interventions:  - Assess patient's functional ability and stability  - Promote increasing activity/tolerance for mobility and gait  - Educate and engage patient/family in tolerated activity level and precautions    11/30/2022 1843 by Radha Rowan RN  Outcome: Adequate for Discharge  11/30/2022 1627 by Radha Rowan RN  Outcome: Progressing     Problem: Diabetes/Glucose Control  Goal: Glucose maintained within prescribed range  Description: INTERVENTIONS:  - Monitor Blood Glucose as ordered  - Assess for signs and symptoms of hyperglycemia and hypoglycemia  - Administer ordered medications to maintain glucose within target range  - Assess barriers to adequate nutritional intake and initiate nutrition consult as needed  - Instruct patient on self management of diabetes  11/30/2022 1843 by Radha Rowan RN  Outcome: Adequate for Discharge  11/30/2022 1627 by Radha Rowan RN  Outcome: Progressing     Problem: SAFETY ADULT - FALL  Goal: Free from fall injury  Description: INTERVENTIONS:  - Assess pt frequently for physical needs  - Identify cognitive and physical deficits and behaviors that affect risk of falls.   - [place] fall precautions as indicated by assessment.  - Educate pt/family on patient safety including physical limitations  - Instruct pt to call for assistance with activity based on assessment  - Modify environment to reduce risk of injury  - Provide assistive devices as appropriate  - Consider OT/PT consult to assist with strengthening/mobility  - Encourage toileting schedule  11/30/2022 1843 by Cary Kanner, RN  Outcome: Adequate for Discharge  11/30/2022 1627 by Cary Kanner, RN  Outcome: Progressing     Problem: DISCHARGE PLANNING  Goal: Discharge to home or other facility with appropriate resources  Description: INTERVENTIONS:  - Identify barriers to discharge w/pt and caregiver  - Include patient/family/discharge partner in discharge planning  - Arrange for needed discharge resources and transportation as appropriate  - Identify discharge learning needs (meds, wound care, etc)  - Arrange for interpreters to assist at discharge as needed  - Consider post-discharge preferences of patient/family/discharge partner  - Complete POLST form as appropriate  - Assess patient's ability to be responsible for managing their own health  - Refer to Case Management Department for coordinating discharge planning if the patient needs post-hospital services based on physician/LIP order or complex needs related to functional status, cognitive ability or social support system  11/30/2022 1843 by Cary Kanner, RN  Outcome: Adequate for Discharge  11/30/2022 1627 by Cary Kanner, RN  Outcome: Progressing

## 2022-12-01 NOTE — TELEPHONE ENCOUNTER
Spoke to pt for TCM today. Pt does not have HFU appt scheduled at this time. TCM/HFU appt recommended by 12/14/2022 as pt is at Moderate risk for readmission. NCHEENA attempted to schedule a TCM HFU. Patient needs an appointment before 12:00 noon, due to MD's full schedule, message sent to MD's office requesting assistance in scheduling. BOOK BY DATE (last date for TCM): 12/14/2022    Clinical staff:  Please call patient and assist with scheduling the TCM HFU within the TCM timeframe. Thank you!

## 2022-12-06 DIAGNOSIS — M05.79 RHEUMATOID ARTHRITIS INVOLVING MULTIPLE SITES WITH POSITIVE RHEUMATOID FACTOR (HCC): ICD-10-CM

## 2022-12-06 DIAGNOSIS — M54.2 BILATERAL POSTERIOR NECK PAIN: ICD-10-CM

## 2022-12-06 DIAGNOSIS — M51.36 LUMBAR DEGENERATIVE DISC DISEASE: ICD-10-CM

## 2022-12-06 RX ORDER — HYDROCODONE BITARTRATE AND ACETAMINOPHEN 10; 325 MG/1; MG/1
1 TABLET ORAL EVERY 6 HOURS PRN
Qty: 120 TABLET | Refills: 0 | Status: SHIPPED | OUTPATIENT
Start: 2022-12-06

## 2023-01-03 DIAGNOSIS — M05.79 RHEUMATOID ARTHRITIS INVOLVING MULTIPLE SITES WITH POSITIVE RHEUMATOID FACTOR (HCC): ICD-10-CM

## 2023-01-03 DIAGNOSIS — M51.36 LUMBAR DEGENERATIVE DISC DISEASE: ICD-10-CM

## 2023-01-03 DIAGNOSIS — M54.2 BILATERAL POSTERIOR NECK PAIN: ICD-10-CM

## 2023-01-03 RX ORDER — HYDROCODONE BITARTRATE AND ACETAMINOPHEN 10; 325 MG/1; MG/1
1 TABLET ORAL EVERY 6 HOURS PRN
Qty: 120 TABLET | Refills: 0 | Status: SHIPPED | OUTPATIENT
Start: 2023-01-03

## 2023-01-10 RX ORDER — LEVETIRACETAM 500 MG/1
500 TABLET ORAL 2 TIMES DAILY
Qty: 60 TABLET | Refills: 0 | Status: SHIPPED | OUTPATIENT
Start: 2023-01-10

## 2023-01-10 NOTE — TELEPHONE ENCOUNTER
Refill passed per [facility] protocol. Did not have HFU visit , last fill was from [address] for your review / approval    Requested Prescriptions   Pending Prescriptions Disp Refills    levETIRAcetam 500 MG Oral Tab 60 tablet 0     Sig: Take 1 tablet (500 mg total) by mouth 2 (two) times daily.        Neurology Medications Passed - 1/10/2023  2:44 PM        Passed - In person appointment or virtual visit in the past 6 mos or appointment in next 3 mos     Recent Outpatient Visits              2 months ago Pre-op testing    [address], [facility] Only    2 months ago Pre-op testing    [facility] Only    3 months ago Anterolisthesis of cervical spine    [facility] Glenda Lee, DO    Office Visit    4 months ago Anterolisthesis of cervical spine    [facility] Daily Gibbons, DO    Office Visit    4 months ago Anterolisthesis of cervical spine    [facility] [place], [address] [place] [address], [facility], DO    Office Visit          Future Appointments         Provider Department Appt Notes    [facility], informed of policy                  Recent Outpatient Visits              2 months ago Pre-op testing    [facility] Only    2 months ago Pre-op testing    [facility] Only    3 months ago Anterolisthesis of cervical spine    [facility] Daily Gibbons, DO    Office Visit    4 months ago Anterolisthesis of cervical spine    [facility] Glenda Lee, DO    Office Visit    4 months ago Anterolisthesis of cervical spine    [address], [place], [place]    Office Visit          Future Appointments         Provider Department Appt Notes    In 2 days Claudine Wiggins, [facility], [address] [facility] [address], [facility] [employer], informed of policy

## 2023-01-10 NOTE — TELEPHONE ENCOUNTER
HOSPITALIST NURSE   TELEPHONE NOTE    Refill request received by the Hospitalist office for levetiracetam tabs. Hospitalist team unable to approve refills as patient is no longer under our care. Request routed to triage RN staff.

## 2023-01-11 RX ORDER — LEVETIRACETAM 500 MG/1
500 TABLET ORAL 2 TIMES DAILY
Qty: 180 TABLET | Refills: 0 | OUTPATIENT
Start: 2023-01-11

## 2023-01-12 ENCOUNTER — OFFICE VISIT (OUTPATIENT)
Dept: PODIATRY CLINIC | Facility: CLINIC | Age: 78
End: 2023-01-12

## 2023-01-12 DIAGNOSIS — M05.771 RHEUMATOID ARTHRITIS INVOLVING BOTH FEET WITH POSITIVE RHEUMATOID FACTOR (HCC): Primary | ICD-10-CM

## 2023-01-12 DIAGNOSIS — M05.772 RHEUMATOID ARTHRITIS INVOLVING BOTH FEET WITH POSITIVE RHEUMATOID FACTOR (HCC): Primary | ICD-10-CM

## 2023-01-12 DIAGNOSIS — B35.1 ONYCHOMYCOSIS: ICD-10-CM

## 2023-01-12 DIAGNOSIS — L84 CALLUS OF FOOT: ICD-10-CM

## 2023-01-12 PROCEDURE — 11721 DEBRIDE NAIL 6 OR MORE: CPT | Performed by: STUDENT IN AN ORGANIZED HEALTH CARE EDUCATION/TRAINING PROGRAM

## 2023-01-12 PROCEDURE — 99203 OFFICE O/P NEW LOW 30 MIN: CPT | Performed by: STUDENT IN AN ORGANIZED HEALTH CARE EDUCATION/TRAINING PROGRAM

## 2023-01-12 PROCEDURE — 11055 PARING/CUTG B9 HYPRKER LES 1: CPT | Performed by: STUDENT IN AN ORGANIZED HEALTH CARE EDUCATION/TRAINING PROGRAM

## 2023-01-12 PROCEDURE — 1125F AMNT PAIN NOTED PAIN PRSNT: CPT | Performed by: STUDENT IN AN ORGANIZED HEALTH CARE EDUCATION/TRAINING PROGRAM

## 2023-02-01 ENCOUNTER — OFFICE VISIT (OUTPATIENT)
Dept: PHYSICAL MEDICINE AND REHAB | Facility: CLINIC | Age: 78
End: 2023-02-01
Payer: COMMERCIAL

## 2023-02-01 VITALS — HEART RATE: 65 BPM | OXYGEN SATURATION: 100 % | BODY MASS INDEX: 23.44 KG/M2 | HEIGHT: 64.5 IN | WEIGHT: 139 LBS

## 2023-02-01 DIAGNOSIS — M47.812 ARTHROPATHY OF CERVICAL FACET JOINT: ICD-10-CM

## 2023-02-01 DIAGNOSIS — M43.12 ANTEROLISTHESIS OF CERVICAL SPINE: Primary | ICD-10-CM

## 2023-02-01 DIAGNOSIS — M48.02 DEGENERATIVE CERVICAL SPINAL STENOSIS: ICD-10-CM

## 2023-02-01 DIAGNOSIS — M05.79 RHEUMATOID ARTHRITIS INVOLVING MULTIPLE SITES WITH POSITIVE RHEUMATOID FACTOR (HCC): ICD-10-CM

## 2023-02-01 DIAGNOSIS — M51.36 LUMBAR DEGENERATIVE DISC DISEASE: ICD-10-CM

## 2023-02-01 DIAGNOSIS — M54.2 BILATERAL POSTERIOR NECK PAIN: ICD-10-CM

## 2023-02-01 PROCEDURE — 99214 OFFICE O/P EST MOD 30 MIN: CPT | Performed by: PHYSICAL MEDICINE & REHABILITATION

## 2023-02-01 PROCEDURE — 1125F AMNT PAIN NOTED PAIN PRSNT: CPT | Performed by: PHYSICAL MEDICINE & REHABILITATION

## 2023-02-01 PROCEDURE — 3008F BODY MASS INDEX DOCD: CPT | Performed by: PHYSICAL MEDICINE & REHABILITATION

## 2023-02-01 RX ORDER — METHYLPREDNISOLONE 4 MG/1
TABLET ORAL
Qty: 1 EACH | Refills: 0 | Status: SHIPPED | OUTPATIENT
Start: 2023-02-01

## 2023-02-02 NOTE — TELEPHONE ENCOUNTER
Please review. Protocol Failed or has no protocol   Requested Prescriptions   Pending Prescriptions Disp Refills    HYDROcodone-acetaminophen (NORCO)  MG Oral Tab 120 tablet 0     Sig: Take 1 tablet by mouth every 6 (six) hours as needed for Pain.        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 ([facility] [address]    [address], [address], [place], [place]    Office Visit    3 months ago Pre-op testing    [facility]    Nurse Only    3 months ago Pre-op testing    [address] Only    3 months ago Anterolisthesis of cervical spine    [address], [address], [facility],     Office Visit          Future Appointments         Provider Department Appt Notes    In 1 week [address] MR RM4 ([address]) [facility] MRI

## 2023-02-04 RX ORDER — HYDROCODONE BITARTRATE AND ACETAMINOPHEN 10; 325 MG/1; MG/1
1 TABLET ORAL EVERY 6 HOURS PRN
Qty: 120 TABLET | Refills: 0 | Status: SHIPPED | OUTPATIENT
Start: 2023-02-04

## 2023-02-13 ENCOUNTER — HOSPITAL ENCOUNTER (OUTPATIENT)
Dept: MRI IMAGING | Facility: HOSPITAL | Age: 78
Discharge: HOME | End: 2023-02-13
Attending: PHYSICAL MEDICINE & REHABILITATION
Payer: MEDICARE

## 2023-02-13 DIAGNOSIS — M48.02 DEGENERATIVE CERVICAL SPINAL STENOSIS: ICD-10-CM

## 2023-02-13 DIAGNOSIS — M47.812 ARTHROPATHY OF CERVICAL FACET JOINT: ICD-10-CM

## 2023-02-13 DIAGNOSIS — M43.12 ANTEROLISTHESIS OF CERVICAL SPINE: ICD-10-CM

## 2023-02-13 PROCEDURE — 72141 MRI NECK SPINE W/O DYE: CPT | Performed by: PHYSICAL MEDICINE & REHABILITATION

## 2023-02-14 RX ORDER — MAGNESIUM OXIDE 400 MG (241.3 MG MAGNESIUM) TABLET
TABLET
Qty: 90 TABLET | Refills: 1 | Status: SHIPPED | OUTPATIENT
Start: 2023-02-14

## 2023-02-22 ENCOUNTER — TELEPHONE (OUTPATIENT)
Dept: PHYSICAL MEDICINE AND REHAB | Facility: CLINIC | Age: 78
End: 2023-02-22

## 2023-02-22 ENCOUNTER — OFFICE VISIT (OUTPATIENT)
Dept: PHYSICAL MEDICINE AND REHAB | Facility: CLINIC | Age: 78
End: 2023-02-22
Payer: COMMERCIAL

## 2023-02-22 VITALS — WEIGHT: 139 LBS | HEIGHT: 64.5 IN | BODY MASS INDEX: 23.44 KG/M2

## 2023-02-22 DIAGNOSIS — M47.812 ARTHROPATHY OF CERVICAL FACET JOINT: Primary | ICD-10-CM

## 2023-02-22 PROCEDURE — 99214 OFFICE O/P EST MOD 30 MIN: CPT | Performed by: PHYSICAL MEDICINE & REHABILITATION

## 2023-02-22 PROCEDURE — 1125F AMNT PAIN NOTED PAIN PRSNT: CPT | Performed by: PHYSICAL MEDICINE & REHABILITATION

## 2023-02-22 PROCEDURE — 3008F BODY MASS INDEX DOCD: CPT | Performed by: PHYSICAL MEDICINE & REHABILITATION

## 2023-02-22 NOTE — TELEPHONE ENCOUNTER
Initiated authorization for Left Cervical C3-4, C4-5, C5-6 Facet joint injection under fluoroscopy guidance CPT 34072, 56148, 87682 dx:M47.812 to be done at [facility] with [facility]  Status: Approved valid 2/22/23-5/23/23    Notification or Prior Authorization is not required for the requested services    This [facility] plan does not currently require a prior authorization for these services. If you have general questions about the prior authorization requirements, please call us at [phone] or visit [employer] > Clinician Resources > Advance and Admission Notification Requirements. The number above acknowledges your notification. Please write this number down for future reference. Notification is not a guarantee of coverage or payment.     Decision ID #:[id number]    Patriciaann Barthel C at [facility] who confirmed the above procedure does not require authorization reference #[id number]

## 2023-02-23 NOTE — TELEPHONE ENCOUNTER
Spoke with Yuki Michael from [facility] Cardiology, paperwork currently pending. Advised to kulwant as urgent as an appointment would like to be done on 2/27/23.

## 2023-02-23 NOTE — TELEPHONE ENCOUNTER
Status of Anticoag  [x] Clearance pending to hold Eliquis for 2 days prior to Left Cervical C3-4, C4-5, C5-6 Facet joint injection under fluoroscopy guidance faxed to Dr. John Cradozo.  F: [phone]  [] Clearance approved  [] Clearance denied

## 2023-02-27 NOTE — TELEPHONE ENCOUNTER
Status of Anticoag  [] Clearance pending  [x] Clearance approved to hold Eliquis for 2 days prior to procedure. Placed into scanning.    [] Clearance denied    LMTCB 1st attempt

## 2023-02-28 RX ORDER — AMITRIPTYLINE HYDROCHLORIDE 10 MG/1
10 TABLET, FILM COATED ORAL NIGHTLY
Qty: 30 TABLET | Refills: 0 | Status: SHIPPED | OUTPATIENT
Start: 2023-02-28

## 2023-02-28 NOTE — TELEPHONE ENCOUNTER
Spoke with Dr. Patel Friedman in regards to patient ready to go to ED due to pain. Wants patient to start Amitriptyline 10mg nightly. Verified pharmacy & ordered and signed. Patient notified- grateful for the call. Will continue all routine medications.

## 2023-02-28 NOTE — TELEPHONE ENCOUNTER
Patient has been scheduled for Left Cervical C3-4, C4-5, C5-6 Facet joint injection under fluoroscopy  on 3/6/23 at the [address] [place] [address] with Dr. Sonia Valerio.   -Anesthesia type: Local.  -Patient informed to fast 12 hours prior to procedure with IVCS/MAC.   -Scheduling [facility] covid testing required for all procedures whether patient is vaccinated or not. -Patient was advised that if he/she does receive the covid vaccine it needs to be at least 2 weeks before or after the injection. -Medications and allergies reviewed. -Patient reminded to hold NSAIDs (Ibuprofen, ASA 81, Aleve, Naproxen, Mobic, Diclofenac, Etodolac, Celebrex etc.) for 3 days prior to Lumbar MBB/Facet if BMI is greater than 35. For Cervical injections only hold multivitamins, Vitamin E, Fish Oil, Phentermine/Lomaira for 7 days prior to injection and NSAIDS.  mg to be held for 7 days prior to injections.  -If patient is receiving MAC/IVCS Phentermine Shaneka Elizabeth) will need to be held for 7 days prior to injection.  -If on blood thinner clearance has been received to hold this medication by provider.   -Patient informed he/she will need a  to and from procedure. Linus Gusman is located in the [facility] [address] 1[address] floor,  may park in the yellow/purple parking lot. Patient verbalized understanding and agrees with plan.  -----> Scheduled in Epic: Yes  -----> Scheduled in [employer]:  Yes

## 2023-03-01 DIAGNOSIS — M54.2 BILATERAL POSTERIOR NECK PAIN: ICD-10-CM

## 2023-03-01 DIAGNOSIS — M05.79 RHEUMATOID ARTHRITIS INVOLVING MULTIPLE SITES WITH POSITIVE RHEUMATOID FACTOR (HCC): ICD-10-CM

## 2023-03-01 DIAGNOSIS — M51.36 LUMBAR DEGENERATIVE DISC DISEASE: ICD-10-CM

## 2023-03-01 RX ORDER — HYDROCODONE BITARTRATE AND ACETAMINOPHEN 10; 325 MG/1; MG/1
1 TABLET ORAL EVERY 6 HOURS PRN
Qty: 120 TABLET | Refills: 0 | Status: SHIPPED | OUTPATIENT
Start: 2023-03-01

## 2023-03-01 NOTE — TELEPHONE ENCOUNTER
No protocol for requested medication. Please advise on refill request.      Requested Prescriptions     Pending Prescriptions Disp Refills    HYDROcodone-acetaminophen (NORCO)  MG Oral Tab 120 tablet 0     Sig: Take 1 tablet by mouth every 6 (six) hours as needed for Pain. Recent Visits  Date Type Provider Dept   08/18/22 Office Visit Komal Sanchez DO [facility]   07/07/22 Office Visit Komal Sanchez DO [facility]   06/10/22 Office Visit Komal Sanchez DO [facility]   Showing recent visits within past 540 days with a meds authorizing provider and meeting all other requirements  Future Appointments  Date Type Provider Dept   04/03/23 Appointment Komal Sanchez DO [facility]   Showing future appointments within next 150 days with a meds authorizing provider and meeting all other requirements     Requested Prescriptions   Pending Prescriptions Disp Refills    HYDROcodone-acetaminophen (NORCO)  MG Oral Tab 120 tablet 0     Sig: Take 1 tablet by mouth every 6 (six) hours as needed for Pain. There is no refill protocol information for this order         Future Appointments         Provider Department Appt Notes    In 5 days Александр Bach DO [facility] Left Cervical C3-4, C4-5, C5-6 Facet joint injection under fluoroscopy    In 1 month Komal Sanchez DO [address] [facility] Blood pressure, arthritis pain and deformities . 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, [employer], [address]    Office Visit    4 months ago Pre-op testing    [facility]    Nurse Only    4 months ago Pre-op testing    [address] Only

## 2023-03-06 ENCOUNTER — OFFICE VISIT (OUTPATIENT)
Dept: SURGERY | Facility: CLINIC | Age: 78
End: 2023-03-06
Payer: COMMERCIAL

## 2023-03-06 DIAGNOSIS — M47.812 ARTHROPATHY OF CERVICAL FACET JOINT: Primary | ICD-10-CM

## 2023-03-06 NOTE — PROCEDURES
[address]    CERVICAL Z-JOINT/FACET INJECTION  NAME:  Torres Galvez    MR #:    [id number] :  3/14/1945     PHYSICIAN:  Steavn Coello DO        Operative Report    DATE OF PROCEDURE: 3/6/2023   PREOPERATIVE DIAGNOSES: 1. Arthropathy of cervical facet joint        POSTOPERATIVE DIAGNOSES:   1. Arthropathy of cervical facet joint        PROCEDURES: left C3-4, C4-5 and C5-6 Z-Joint Injection done under fluoroscopic guidance with contrast enhancement. SURGEON: Stevan Coello DO   ANESTHESIA: Local   INDICATIONS:      OPERATIVE PROCEDURE:  Written consent was obtained from the patient. The patient was brought into the operating room and placed in the prone position on the fluoroscopy table with pillow underneath the chest and shoulders. The patient's skin was cleaned and draped in a normal sterile fashion. Using AP fluoroscopy, the left C3-4, C4-5 and C5-6 z-joints were identified. Skin was anesthetized with 1% PF lidocaine without epinephrine overlying each joint. Then, a 3-1/2 inch, 22-gauge spinal needle was inserted and directed towards the left C3-4, C4-5 and C5-6 z-joint(s). When they were engaged, Omnipaque-240 contrast was used to obtain a good arthrogram indicating correct needle placement. Then, aspiration was performed. No blood, fluid, or air was aspirated. Then, each joint was injected with a 1 cc solution of 0.5 cc of 1% PF lidocaine without epinephrine and 0.5 cc of 1 mg/cc of 40 mg of Kenalog/cc. Then, the needles were removed. The patient's skin was cleaned. Band-Aids were applied. The patient was transferred to the [facility] and into [facility]. The patient was given discharge instructions and will follow up in the clinic as scheduled. Throughout the whole procedure, the patient's pulse oximetry and vital signs were monitored and they remained completely stable.   Also, throughout the whole procedure, prior to injection of any medication, aspiration was performed. No blood, fluid, or air was aspirated at anytime.     Jhon Rod DO, FAAPMR & CAQSM  Physical Medicine and Rehabilitation/Sports Medicine  MEDICAL CENTER [facility]

## 2023-03-14 ENCOUNTER — TELEPHONE (OUTPATIENT)
Dept: NEUROLOGY | Facility: CLINIC | Age: 78
End: 2023-03-14

## 2023-03-14 ENCOUNTER — OFFICE VISIT (OUTPATIENT)
Dept: PHYSICAL MEDICINE AND REHAB | Facility: CLINIC | Age: 78
End: 2023-03-14
Payer: COMMERCIAL

## 2023-03-14 VITALS
BODY MASS INDEX: 23.44 KG/M2 | DIASTOLIC BLOOD PRESSURE: 80 MMHG | WEIGHT: 139 LBS | HEART RATE: 50 BPM | OXYGEN SATURATION: 99 % | HEIGHT: 64.5 IN | SYSTOLIC BLOOD PRESSURE: 112 MMHG

## 2023-03-14 DIAGNOSIS — M54.12 CERVICAL RADICULOPATHY: Primary | ICD-10-CM

## 2023-03-14 PROCEDURE — 99214 OFFICE O/P EST MOD 30 MIN: CPT | Performed by: PHYSICAL MEDICINE & REHABILITATION

## 2023-03-14 PROCEDURE — 3008F BODY MASS INDEX DOCD: CPT | Performed by: PHYSICAL MEDICINE & REHABILITATION

## 2023-03-14 PROCEDURE — 3074F SYST BP LT 130 MM HG: CPT | Performed by: PHYSICAL MEDICINE & REHABILITATION

## 2023-03-14 PROCEDURE — 3079F DIAST BP 80-89 MM HG: CPT | Performed by: PHYSICAL MEDICINE & REHABILITATION

## 2023-03-14 PROCEDURE — 1125F AMNT PAIN NOTED PAIN PRSNT: CPT | Performed by: PHYSICAL MEDICINE & REHABILITATION

## 2023-03-14 NOTE — TELEPHONE ENCOUNTER
Prior authorization request completed for: C7-T1 Interlaminar Epidural Steroid Injection under fluoroscopy guidance under local anesthesia  Authorization # NO PRIOR AUTHORIZATION / PREDETERMINATION REQUIRED  Authorization dates: N/A  CPT codes approved: 14386  Number of visits/dates of service approved: 1  Physician: Dr. Yari Fisher   Location: [address] [place] [address] and spoke with Sosa who informed me the code is valid and billable and done in a surgery center no PA or predetermination is required     Call Ref#: [id number]   Representative Name: Sosa COKER    Notification or Prior Authorization is not required for the requested services    This [employer]vant[employer] members plan does not currently require a prior authorization for these services. If you have general questions about the prior authorization requirements, please call us at [phone] or visit [employer] > Clinician Resources > Advance and Admission Notification Requirements. The number above acknowledges your notification. Please write this number down for future reference. Notification is not a guarantee of coverage or payment.     Decision ID #:[id number]

## 2023-03-14 NOTE — PATIENT INSTRUCTIONS
-My office will call once injection is approved  -Continue topical treatment as tolerated  -If no better, will consider neurosurgery evaluation

## 2023-03-27 RX ORDER — AMITRIPTYLINE HYDROCHLORIDE 10 MG/1
10 TABLET, FILM COATED ORAL NIGHTLY
Qty: 30 TABLET | Refills: 0 | Status: SHIPPED | OUTPATIENT
Start: 2023-03-27

## 2023-03-27 RX ORDER — AMITRIPTYLINE HYDROCHLORIDE 10 MG/1
TABLET, FILM COATED ORAL
Qty: 30 TABLET | Refills: 0 | OUTPATIENT
Start: 2023-03-27

## 2023-03-27 NOTE — TELEPHONE ENCOUNTER
Refill Request    Medication request: amitriptyline 10 MG Oral Tab    LOV:3/14/2023 Lopez Andino DO   RTC: N/A  NOV: Visit date not found      [place]Last ref[place]: 2/28/2023 #30 days    UDS: (if applicable): N/A  Pain contract: N/A    LOV plan (if weaning or changing medications): N/A

## 2023-03-27 NOTE — TELEPHONE ENCOUNTER
Duplicate request - see 03/23/2023 for further details. Medication has already been routed to Dr. Rose Coello and is pending his signature at this time.

## 2023-04-03 DIAGNOSIS — M05.79 RHEUMATOID ARTHRITIS INVOLVING MULTIPLE SITES WITH POSITIVE RHEUMATOID FACTOR (HCC): ICD-10-CM

## 2023-04-03 DIAGNOSIS — M51.36 LUMBAR DEGENERATIVE DISC DISEASE: ICD-10-CM

## 2023-04-03 DIAGNOSIS — M54.2 BILATERAL POSTERIOR NECK PAIN: ICD-10-CM

## 2023-04-03 DIAGNOSIS — K21.9 GASTROESOPHAGEAL REFLUX DISEASE, UNSPECIFIED WHETHER ESOPHAGITIS PRESENT: ICD-10-CM

## 2023-04-04 RX ORDER — CARVEDILOL 12.5 MG/1
12.5 TABLET ORAL 2 TIMES DAILY
Qty: 180 TABLET | Refills: 1 | Status: SHIPPED | OUTPATIENT
Start: 2023-04-04

## 2023-04-04 RX ORDER — HYDROCODONE BITARTRATE AND ACETAMINOPHEN 10; 325 MG/1; MG/1
1 TABLET ORAL EVERY 6 HOURS PRN
Qty: 120 TABLET | Refills: 0 | Status: SHIPPED | OUTPATIENT
Start: 2023-04-04

## 2023-04-04 RX ORDER — PANTOPRAZOLE SODIUM 40 MG/1
40 TABLET, DELAYED RELEASE ORAL DAILY
Qty: 90 TABLET | Refills: 3 | Status: SHIPPED | OUTPATIENT
Start: 2023-04-04

## 2023-04-10 ENCOUNTER — OFFICE VISIT (OUTPATIENT)
Dept: SURGERY | Facility: CLINIC | Age: 78
End: 2023-04-10
Payer: COMMERCIAL

## 2023-04-10 DIAGNOSIS — M54.12 CERVICAL RADICULOPATHY: Primary | ICD-10-CM

## 2023-04-10 NOTE — PROCEDURES
[address]    CERVICAL INTERLAMINAR  NAME:  Abraham Alonso    MR #:    [id number] :  3/14/1945     PHYSICIAN:  Chana Huerta. Loly Griffith DO        Operative Report    DATE OF PROCEDURE: 4/10/2023   PREOPERATIVE DIAGNOSES: 1. Cervical radiculopathy        POSTOPERATIVE DIAGNOSES:   1. Cervical radiculopathy        PROCEDURES: C7-T1 inter laminar epidural steroid injection done under fluoroscopic guidance with contrast enhancement. SURGEON: Chana Huerta. Loly Griffith DO   ANESTHESIA: Local   INDICATIONS:      OPERATIVE PROCEDURE:  Written consent was obtained from the patient. The patient was brought into the operating room and placed in the prone position on the fluoroscopy table with pillow underneath the chest and shoulders. The patient's skin was cleaned and draped in a normal sterile fashion. Using AP fluoroscopy, the left C7 lamina was identified. Skin was anesthetized with 1% PF lidocaine without epinephrine. Then, a 3-1/2 inch, 20-gauge Tuohy needle was inserted and directed towards the left C7 lamina. When it was engaged, it was walked inferiorly and medially until it was felt to drop off the inferomedial aspect. Then, Omnipaque-240 contrast was used to obtain a good epidurogram indicating correct needle placement. Then, aspiration was performed. No blood, fluid, or air was aspirated. Then, the patient was injected with a 3 cc solution of 1.5 cc of normal sterile saline and 1.5 cc of 10 mg/cc of Dexamethasone. Then, the needle was removed. The patient's skin was cleaned. A Band-Aid was applied. The patient was transferred to the cart and into [facility]. The patient was given discharge instructions and will follow up in the clinic as scheduled. Throughout the whole procedure, the patient's pulse oximetry and vital signs were monitored and they remained completely stable. Also, throughout the whole procedure, prior to injection of any medication, aspiration was performed.   No blood, fluid, or air was aspirated at anytime.     Geoff Denney DO, FAAPMR & CAQSM  Physical Medicine and Rehabilitation/Sports Medicine  MEDICAL CENTER [facility]

## 2023-04-13 ENCOUNTER — MED REC SCAN ONLY (OUTPATIENT)
Dept: FAMILY MEDICINE CLINIC | Facility: CLINIC | Age: 78
End: 2023-04-13

## 2023-04-18 ENCOUNTER — OFFICE VISIT (OUTPATIENT)
Dept: FAMILY MEDICINE CLINIC | Facility: CLINIC | Age: 78
End: 2023-04-18

## 2023-04-18 ENCOUNTER — TELEPHONE (OUTPATIENT)
Dept: PHYSICAL MEDICINE AND REHAB | Facility: CLINIC | Age: 78
End: 2023-04-18

## 2023-04-18 ENCOUNTER — TELEPHONE (OUTPATIENT)
Dept: FAMILY MEDICINE CLINIC | Facility: CLINIC | Age: 78
End: 2023-04-18

## 2023-04-18 VITALS
TEMPERATURE: 98 F | HEART RATE: 79 BPM | BODY MASS INDEX: 25.32 KG/M2 | DIASTOLIC BLOOD PRESSURE: 76 MMHG | HEIGHT: 64.5 IN | WEIGHT: 150.13 LBS | SYSTOLIC BLOOD PRESSURE: 124 MMHG

## 2023-04-18 DIAGNOSIS — M05.79 RHEUMATOID ARTHRITIS INVOLVING MULTIPLE SITES WITH POSITIVE RHEUMATOID FACTOR (HCC): ICD-10-CM

## 2023-04-18 DIAGNOSIS — G89.29 OTHER CHRONIC PAIN: Primary | ICD-10-CM

## 2023-04-18 DIAGNOSIS — M54.9 PAIN IN VERTEBRAL COLUMN: ICD-10-CM

## 2023-04-18 DIAGNOSIS — M43.12 ANTEROLISTHESIS OF CERVICAL SPINE: Primary | ICD-10-CM

## 2023-04-18 DIAGNOSIS — M62.838 MUSCLE SPASMS OF NECK: ICD-10-CM

## 2023-04-18 DIAGNOSIS — M54.2 BILATERAL POSTERIOR NECK PAIN: ICD-10-CM

## 2023-04-18 DIAGNOSIS — G62.9 SENSORY NEUROPATHY: ICD-10-CM

## 2023-04-18 DIAGNOSIS — I10 PRIMARY HYPERTENSION: ICD-10-CM

## 2023-04-18 DIAGNOSIS — Z23 NEED FOR PNEUMOCOCCAL VACCINATION: ICD-10-CM

## 2023-04-18 PROCEDURE — 3078F DIAST BP <80 MM HG: CPT | Performed by: FAMILY MEDICINE

## 2023-04-18 PROCEDURE — 3008F BODY MASS INDEX DOCD: CPT | Performed by: FAMILY MEDICINE

## 2023-04-18 PROCEDURE — 90677 PCV20 VACCINE IM: CPT | Performed by: FAMILY MEDICINE

## 2023-04-18 PROCEDURE — 3074F SYST BP LT 130 MM HG: CPT | Performed by: FAMILY MEDICINE

## 2023-04-18 PROCEDURE — 99214 OFFICE O/P EST MOD 30 MIN: CPT | Performed by: FAMILY MEDICINE

## 2023-04-18 PROCEDURE — G0009 ADMIN PNEUMOCOCCAL VACCINE: HCPCS | Performed by: FAMILY MEDICINE

## 2023-04-18 RX ORDER — CYCLOBENZAPRINE HCL 10 MG
10 TABLET ORAL 3 TIMES DAILY
Qty: 30 TABLET | Refills: 1 | Status: ON HOLD | OUTPATIENT
Start: 2023-04-18 | End: 2023-05-08

## 2023-04-18 NOTE — PATIENT INSTRUCTIONS
Patient being referred to chiropractor. Keep all physiatry appointments. Cyclobenzaprine has also been prescribed. Patient consented to pneumococcal vaccine on today. Continue to comply with medications.

## 2023-04-18 NOTE — TELEPHONE ENCOUNTER
Pain in vertebral column M54.9   Anterolisthesis of cervical spine M43.12     Bilateral posterior neck pain M54.2     Rheumatoid arthritis involving multiple sites with positive rheumatoid factor (HCC) M05.79

## 2023-04-18 NOTE — TELEPHONE ENCOUNTER
Per Tri, a prior authorization has been started for Cyclobenzaprine HCI 10mg Tablets. Login to [email or web address] and click \"Enter a Key\". Enter the patient's last name, date of birth and the Key:    Key: [id number]    Patient Last Name: Herman Castillo    : 1945    Complete the prior authorization and click \"Send to Plan\" for approval. Please notify Tri when a determination has been received from the plan.

## 2023-04-19 RX ORDER — BACLOFEN 10 MG/1
10 TABLET ORAL 2 TIMES DAILY
Qty: 60 TABLET | Refills: 0 | Status: ON HOLD | OUTPATIENT
Start: 2023-04-19

## 2023-04-19 NOTE — TELEPHONE ENCOUNTER
Please let the patient know that I have sent in the baclofen and also that is covered by her insurance. Thank you.

## 2023-04-22 ENCOUNTER — APPOINTMENT (OUTPATIENT)
Dept: GENERAL RADIOLOGY | Facility: HOSPITAL | Age: 78
DRG: 871 | End: 2023-04-22
Attending: EMERGENCY MEDICINE
Payer: MEDICARE

## 2023-04-22 ENCOUNTER — HOSPITAL ENCOUNTER (INPATIENT)
Facility: HOSPITAL | Age: 78
LOS: 6 days | Discharge: HOME | DRG: 871 | End: 2023-04-28
Attending: EMERGENCY MEDICINE | Admitting: INTERNAL MEDICINE
Payer: MEDICARE

## 2023-04-22 DIAGNOSIS — E87.20 METABOLIC ACIDOSIS: ICD-10-CM

## 2023-04-22 DIAGNOSIS — A41.9 SEPSIS DUE TO URINARY TRACT INFECTION (HCC): ICD-10-CM

## 2023-04-22 DIAGNOSIS — N17.9 ACUTE RENAL FAILURE, UNSPECIFIED ACUTE RENAL FAILURE TYPE (HCC): ICD-10-CM

## 2023-04-22 DIAGNOSIS — Q27.30 AVM (ARTERIOVENOUS MALFORMATION): ICD-10-CM

## 2023-04-22 DIAGNOSIS — K92.2 ACUTE GI BLEEDING: Primary | ICD-10-CM

## 2023-04-22 DIAGNOSIS — N39.0 SEPSIS DUE TO URINARY TRACT INFECTION (HCC): ICD-10-CM

## 2023-04-22 DIAGNOSIS — D69.6 THROMBOCYTOPENIA (HCC): ICD-10-CM

## 2023-04-22 DIAGNOSIS — I48.20 ATRIAL FIBRILLATION, CHRONIC (HCC): ICD-10-CM

## 2023-04-22 PROBLEM — D72.829 LEUKOCYTOSIS: Status: ACTIVE | Noted: 2023-04-22

## 2023-04-22 LAB
ANION GAP SERPL CALC-SCNC: 13 MMOL/L (ref 0–18)
ANTIBODY SCREEN: NEGATIVE
BASOPHILS # BLD: 0 X10(3) UL (ref 0–0.2)
BASOPHILS NFR BLD: 0 %
BILIRUB UR QL: NEGATIVE
BUN BLD-MCNC: 50 MG/DL (ref 7–18)
BUN/CREAT SERPL: 13.8 (ref 10–20)
CALCIUM BLD-MCNC: 8.6 MG/DL (ref 8.5–10.1)
CHLORIDE SERPL-SCNC: 103 MMOL/L (ref 98–112)
CO2 SERPL-SCNC: 20 MMOL/L (ref 21–32)
COLOR UR: YELLOW
CREAT BLD-MCNC: 3.63 MG/DL
DEPRECATED RDW RBC AUTO: 51.5 FL (ref 35.1–46.3)
EOSINOPHIL # BLD: 0 X10(3) UL (ref 0–0.7)
EOSINOPHIL NFR BLD: 0 %
ERYTHROCYTE [DISTWIDTH] IN BLOOD BY AUTOMATED COUNT: 15.2 % (ref 11–15)
GFR SERPLBLD BASED ON 1.73 SQ M-ARVRAT: 12 ML/MIN/1.73M2 (ref 60–?)
GLUCOSE BLD-MCNC: 110 MG/DL (ref 70–99)
GLUCOSE UR-MCNC: NORMAL MG/DL
HCT VFR BLD AUTO: 27.8 %
HGB BLD-MCNC: 8.9 G/DL
HYALINE CASTS #/AREA URNS AUTO: PRESENT /LPF
INR BLD: 3.52 (ref 0.85–1.16)
KETONES UR-MCNC: NEGATIVE MG/DL
LACTATE SERPL-SCNC: 1.4 MMOL/L (ref 0.4–2)
LACTATE SERPL-SCNC: 2.1 MMOL/L (ref 0.4–2)
LEUKOCYTE ESTERASE UR QL STRIP.AUTO: 500
LYMPHOCYTES NFR BLD: 1.42 X10(3) UL (ref 1–4)
LYMPHOCYTES NFR BLD: 6 %
MCH RBC QN AUTO: 30.1 PG (ref 26–34)
MCHC RBC AUTO-ENTMCNC: 32 G/DL (ref 31–37)
MCV RBC AUTO: 93.9 FL
METAMYELOCYTES # BLD: 0.47 X10(3) UL
METAMYELOCYTES NFR BLD: 2 %
MONOCYTES # BLD: 0.71 X10(3) UL (ref 0.1–1)
MONOCYTES NFR BLD: 3 %
NEUTROPHILS # BLD AUTO: 19.66 X10 (3) UL (ref 1.5–7.7)
NEUTROPHILS NFR BLD: 84 %
NEUTS BAND NFR BLD: 5 %
NEUTS HYPERSEG # BLD: 21 X10(3) UL (ref 1.5–7.7)
NITRITE UR QL STRIP.AUTO: NEGATIVE
OSMOLALITY SERPL CALC.SUM OF ELEC: 296 MOSM/KG (ref 275–295)
PH UR: 5.5 [PH] (ref 5–8)
PLATELET # BLD AUTO: 111 10(3)UL (ref 150–450)
POTASSIUM SERPL-SCNC: 4.3 MMOL/L (ref 3.5–5.1)
PROT UR-MCNC: 100 MG/DL
PROTHROMBIN TIME: 34.5 SECONDS (ref 11.6–14.8)
RBC # BLD AUTO: 2.96 X10(6)UL
RBC #/AREA URNS AUTO: >10 /HPF
RH BLOOD TYPE: POSITIVE
RH BLOOD TYPE: POSITIVE
SARS-COV-2 RNA RESP QL NAA+PROBE: NOT DETECTED
SODIUM SERPL-SCNC: 136 MMOL/L (ref 136–145)
SP GR UR STRIP: 1.02 (ref 1–1.03)
TOTAL CELLS COUNTED BLD: 100
TROPONIN I HIGH SENSITIVITY: 49 NG/L
UROBILINOGEN UR STRIP-ACNC: NORMAL
WBC # BLD AUTO: 23.6 X10(3) UL (ref 4–11)
WBC #/AREA URNS AUTO: >50 /HPF
YEAST UR QL: PRESENT /HPF

## 2023-04-22 PROCEDURE — 71045 X-RAY EXAM CHEST 1 VIEW: CPT | Performed by: EMERGENCY MEDICINE

## 2023-04-22 PROCEDURE — 99223 1ST HOSP IP/OBS HIGH 75: CPT | Performed by: INTERNAL MEDICINE

## 2023-04-22 RX ORDER — ACETAMINOPHEN 325 MG/1
650 TABLET ORAL EVERY 4 HOURS PRN
Status: DISCONTINUED | OUTPATIENT
Start: 2023-04-22 | End: 2023-04-28

## 2023-04-22 RX ORDER — ONDANSETRON 2 MG/ML
4 INJECTION INTRAMUSCULAR; INTRAVENOUS EVERY 6 HOURS PRN
Status: DISCONTINUED | OUTPATIENT
Start: 2023-04-22 | End: 2023-04-28

## 2023-04-22 RX ORDER — SODIUM CHLORIDE 9 MG/ML
INJECTION, SOLUTION INTRAVENOUS CONTINUOUS
Status: CANCELLED | OUTPATIENT
Start: 2023-04-22 | End: 2023-04-22

## 2023-04-22 RX ORDER — SODIUM CHLORIDE 9 MG/ML
INJECTION, SOLUTION INTRAVENOUS CONTINUOUS
Status: DISCONTINUED | OUTPATIENT
Start: 2023-04-22 | End: 2023-04-23

## 2023-04-22 RX ORDER — ACETAMINOPHEN 650 MG/1
650 SUPPOSITORY RECTAL ONCE
Status: COMPLETED | OUTPATIENT
Start: 2023-04-22 | End: 2023-04-22

## 2023-04-22 RX ORDER — HYDROCODONE BITARTRATE AND ACETAMINOPHEN 5; 325 MG/1; MG/1
1 TABLET ORAL EVERY 4 HOURS PRN
Status: DISCONTINUED | OUTPATIENT
Start: 2023-04-22 | End: 2023-04-28

## 2023-04-22 RX ORDER — ACETAMINOPHEN 325 MG/1
650 TABLET ORAL EVERY 6 HOURS PRN
Status: DISCONTINUED | OUTPATIENT
Start: 2023-04-22 | End: 2023-04-28

## 2023-04-22 RX ORDER — LEVETIRACETAM 500 MG/1
500 TABLET ORAL 2 TIMES DAILY
Status: DISCONTINUED | OUTPATIENT
Start: 2023-04-22 | End: 2023-04-28

## 2023-04-22 RX ORDER — SODIUM CHLORIDE 9 MG/ML
INJECTION, SOLUTION INTRAVENOUS CONTINUOUS
Status: DISCONTINUED | OUTPATIENT
Start: 2023-04-22 | End: 2023-04-24

## 2023-04-22 RX ORDER — HYDROCODONE BITARTRATE AND ACETAMINOPHEN 5; 325 MG/1; MG/1
2 TABLET ORAL EVERY 4 HOURS PRN
Status: DISCONTINUED | OUTPATIENT
Start: 2023-04-22 | End: 2023-04-28

## 2023-04-22 NOTE — ED QUICK NOTES
Orders for admission, patient is aware of plan and ready to go upstairs. Any questions, please call ED RN Cory Monet  at extension [phone]  Type of COVID test sent:  COVID Suspicion level: Rapid negative     Titratable drug(s) infusing:  Rate:See mar GABRIEL 20    LOC at time of transport:   Ax4  GI Bleed   A fib

## 2023-04-23 ENCOUNTER — APPOINTMENT (OUTPATIENT)
Dept: ULTRASOUND IMAGING | Facility: HOSPITAL | Age: 78
DRG: 871 | End: 2023-04-23
Attending: INTERNAL MEDICINE
Payer: MEDICARE

## 2023-04-23 PROBLEM — N17.9 AKI (ACUTE KIDNEY INJURY) (HCC): Status: ACTIVE | Noted: 2023-04-22

## 2023-04-23 LAB
ALBUMIN SERPL-MCNC: 2.2 G/DL (ref 3.4–5)
ALP LIVER SERPL-CCNC: 92 U/L
ALT SERPL-CCNC: 20 U/L
ANION GAP SERPL CALC-SCNC: 12 MMOL/L (ref 0–18)
AST SERPL-CCNC: 36 U/L (ref 15–37)
BASOPHILS # BLD AUTO: 0.03 X10(3) UL (ref 0–0.2)
BASOPHILS NFR BLD AUTO: 0.2 %
BILIRUB DIRECT SERPL-MCNC: 0.8 MG/DL (ref 0–0.2)
BILIRUB SERPL-MCNC: 1.1 MG/DL (ref 0.1–2)
BLACTX-M ISLT/SPM QL: NOT DETECTED
BUN BLD-MCNC: 52 MG/DL (ref 7–18)
BUN/CREAT SERPL: 18.4 (ref 10–20)
C DIFF TOX B STL QL: NEGATIVE
CALCIUM BLD-MCNC: 7.3 MG/DL (ref 8.5–10.1)
CHLORIDE SERPL-SCNC: 111 MMOL/L (ref 98–112)
CO2 SERPL-SCNC: 17 MMOL/L (ref 21–32)
CREAT BLD-MCNC: 2.83 MG/DL
DEPRECATED RDW RBC AUTO: 51.3 FL (ref 35.1–46.3)
E COLI DNA BLD POS QL NAA+NON-PROBE: DETECTED
EOSINOPHIL # BLD AUTO: 0.24 X10(3) UL (ref 0–0.7)
EOSINOPHIL NFR BLD AUTO: 1.4 %
ERYTHROCYTE [DISTWIDTH] IN BLOOD BY AUTOMATED COUNT: 15.3 % (ref 11–15)
GFR SERPLBLD BASED ON 1.73 SQ M-ARVRAT: 17 ML/MIN/1.73M2 (ref 60–?)
GLUCOSE BLD-MCNC: 100 MG/DL (ref 70–99)
HCT VFR BLD AUTO: 24.3 %
HCT VFR BLD AUTO: 25.2 %
HCT VFR BLD AUTO: 27 %
HGB BLD-MCNC: 8 G/DL
HGB BLD-MCNC: 8 G/DL
HGB BLD-MCNC: 8.7 G/DL
IMM GRANULOCYTES # BLD AUTO: 0.2 X10(3) UL (ref 0–1)
IMM GRANULOCYTES NFR BLD: 1.1 %
IRON SATN MFR SERPL: 15 %
IRON SERPL-MCNC: 27 UG/DL
LYMPHOCYTES # BLD AUTO: 0.51 X10(3) UL (ref 1–4)
LYMPHOCYTES NFR BLD AUTO: 2.9 %
MAGNESIUM SERPL-MCNC: 1.7 MG/DL (ref 1.6–2.6)
MCH RBC QN AUTO: 30.4 PG (ref 26–34)
MCHC RBC AUTO-ENTMCNC: 32.9 G/DL (ref 31–37)
MCV RBC AUTO: 92.4 FL
MONOCYTES # BLD AUTO: 0.36 X10(3) UL (ref 0.1–1)
MONOCYTES NFR BLD AUTO: 2 %
NEUTROPHILS # BLD AUTO: 16.32 X10 (3) UL (ref 1.5–7.7)
NEUTROPHILS # BLD AUTO: 16.32 X10(3) UL (ref 1.5–7.7)
NEUTROPHILS NFR BLD AUTO: 92.4 %
OSMOLALITY SERPL CALC.SUM OF ELEC: 304 MOSM/KG (ref 275–295)
PLATELET # BLD AUTO: 75 10(3)UL (ref 150–450)
POTASSIUM SERPL-SCNC: 3.9 MMOL/L (ref 3.5–5.1)
PROT SERPL-MCNC: 6.2 G/DL (ref 6.4–8.2)
RBC # BLD AUTO: 2.63 X10(6)UL
SODIUM SERPL-SCNC: 140 MMOL/L (ref 136–145)
TIBC SERPL-MCNC: 179 UG/DL (ref 240–450)
TRANSFERRIN SERPL-MCNC: 120 MG/DL (ref 200–360)
WBC # BLD AUTO: 17.7 X10(3) UL (ref 4–11)

## 2023-04-23 PROCEDURE — 99233 SBSQ HOSP IP/OBS HIGH 50: CPT | Performed by: INTERNAL MEDICINE

## 2023-04-23 PROCEDURE — 99223 1ST HOSP IP/OBS HIGH 75: CPT | Performed by: INTERNAL MEDICINE

## 2023-04-23 PROCEDURE — 76770 US EXAM ABDO BACK WALL COMP: CPT | Performed by: INTERNAL MEDICINE

## 2023-04-23 RX ORDER — VANCOMYCIN HYDROCHLORIDE 125 MG/1
125 CAPSULE ORAL DAILY
Status: DISCONTINUED | OUTPATIENT
Start: 2023-04-23 | End: 2023-04-28

## 2023-04-23 RX ORDER — MAGNESIUM SULFATE HEPTAHYDRATE 40 MG/ML
2 INJECTION, SOLUTION INTRAVENOUS ONCE
Status: COMPLETED | OUTPATIENT
Start: 2023-04-23 | End: 2023-04-23

## 2023-04-23 NOTE — PLAN OF CARE
Problem: HEMATOLOGIC - ADULT  Goal: Maintains hematologic stability  Description: INTERVENTIONS  - Assess for signs and symptoms of bleeding or hemorrhage  - Monitor labs and vital signs for trends  - Administer supportive blood products/factors, fluids and medications as ordered and appropriate  - Administer supportive blood products/factors as ordered and appropriate  Outcome: Not Progressing     Problem: Impaired Activities of Daily Living  Goal: Achieve highest/safest level of independence in self care  Description: Interventions:  - Assess ability and encourage patient to participate in ADLs to maximize function  - Promote sitting position while performing ADLs such as feeding, grooming, and bathing  - Educate and encourage patient/family in tolerated functional activity level and precautions during self-care  Outcome: Not Progressing     Problem: PAIN - ADULT  Goal: Verbalizes/displays adequate comfort level or patient's stated pain goal  Description: INTERVENTIONS:  - Encourage pt to monitor pain and request assistance  - Assess pain using appropriate pain scale  - Administer analgesics based on type and severity of pain and evaluate response  - Implement non-pharmacological measures as appropriate and evaluate response  - Consider cultural and social influences on pain and pain management  - Manage/alleviate anxiety  - Utilize distraction and/or relaxation techniques  - Monitor for opioid side effects  - Notify MD/LIP if interventions unsuccessful or patient reports new pain  - Anticipate increased pain with activity and pre-medicate as appropriate  Outcome: Progressing

## 2023-04-24 LAB
ALBUMIN SERPL-MCNC: 2 G/DL (ref 3.4–5)
ALBUMIN/GLOB SERPL: 0.5 {RATIO} (ref 1–2)
ALP LIVER SERPL-CCNC: 105 U/L
ALT SERPL-CCNC: 16 U/L
ANION GAP SERPL CALC-SCNC: 9 MMOL/L (ref 0–18)
AST SERPL-CCNC: 19 U/L (ref 15–37)
ATRIAL RATE: 106 BPM
BASOPHILS # BLD AUTO: 0.03 X10(3) UL (ref 0–0.2)
BASOPHILS NFR BLD AUTO: 0.2 %
BILIRUB SERPL-MCNC: 1 MG/DL (ref 0.1–2)
BUN BLD-MCNC: 53 MG/DL (ref 7–18)
BUN/CREAT SERPL: 25.5 (ref 10–20)
CALCIUM BLD-MCNC: 7.4 MG/DL (ref 8.5–10.1)
CHLORIDE SERPL-SCNC: 108 MMOL/L (ref 98–112)
CO2 SERPL-SCNC: 20 MMOL/L (ref 21–32)
CREAT BLD-MCNC: 2.08 MG/DL
DEPRECATED RDW RBC AUTO: 49.7 FL (ref 35.1–46.3)
EOSINOPHIL # BLD AUTO: 0.04 X10(3) UL (ref 0–0.7)
EOSINOPHIL NFR BLD AUTO: 0.3 %
ERYTHROCYTE [DISTWIDTH] IN BLOOD BY AUTOMATED COUNT: 15.1 % (ref 11–15)
GFR SERPLBLD BASED ON 1.73 SQ M-ARVRAT: 24 ML/MIN/1.73M2 (ref 60–?)
GLOBULIN PLAS-MCNC: 4 G/DL (ref 2.8–4.4)
GLUCOSE BLD-MCNC: 110 MG/DL (ref 70–99)
HCT VFR BLD AUTO: 22.9 %
HGB BLD-MCNC: 7.5 G/DL
IMM GRANULOCYTES # BLD AUTO: 0.1 X10(3) UL (ref 0–1)
IMM GRANULOCYTES NFR BLD: 0.8 %
LYMPHOCYTES # BLD AUTO: 0.57 X10(3) UL (ref 1–4)
LYMPHOCYTES NFR BLD AUTO: 4.7 %
MAGNESIUM SERPL-MCNC: 2.4 MG/DL (ref 1.6–2.6)
MCH RBC QN AUTO: 29.8 PG (ref 26–34)
MCHC RBC AUTO-ENTMCNC: 32.8 G/DL (ref 31–37)
MCV RBC AUTO: 90.9 FL
MONOCYTES # BLD AUTO: 0.54 X10(3) UL (ref 0.1–1)
MONOCYTES NFR BLD AUTO: 4.5 %
NEUTROPHILS # BLD AUTO: 10.76 X10 (3) UL (ref 1.5–7.7)
NEUTROPHILS # BLD AUTO: 10.76 X10(3) UL (ref 1.5–7.7)
NEUTROPHILS NFR BLD AUTO: 89.5 %
OSMOLALITY SERPL CALC.SUM OF ELEC: 299 MOSM/KG (ref 275–295)
P AXIS: 78 DEGREES
P-R INTERVAL: 232 MS
PHOSPHATE SERPL-MCNC: 3.2 MG/DL (ref 2.5–4.9)
PLATELET # BLD AUTO: 76 10(3)UL (ref 150–450)
POTASSIUM SERPL-SCNC: 3.3 MMOL/L (ref 3.5–5.1)
PROT SERPL-MCNC: 6 G/DL (ref 6.4–8.2)
Q-T INTERVAL: 328 MS
Q-T INTERVAL: 340 MS
QRS DURATION: 92 MS
QRS DURATION: 92 MS
QTC CALCULATION (BEZET): 451 MS
QTC CALCULATION (BEZET): 452 MS
R AXIS: 33 DEGREES
R AXIS: 53 DEGREES
RBC # BLD AUTO: 2.52 X10(6)UL
SODIUM SERPL-SCNC: 137 MMOL/L (ref 136–145)
T AXIS: 55 DEGREES
T AXIS: 74 DEGREES
TROPONIN I HIGH SENSITIVITY: 23 NG/L
VENTRICULAR RATE: 106 BPM
VENTRICULAR RATE: 114 BPM
WBC # BLD AUTO: 12 X10(3) UL (ref 4–11)

## 2023-04-24 PROCEDURE — 99233 SBSQ HOSP IP/OBS HIGH 50: CPT | Performed by: INTERNAL MEDICINE

## 2023-04-24 RX ORDER — POTASSIUM CHLORIDE 20 MEQ/1
40 TABLET, EXTENDED RELEASE ORAL ONCE
Status: COMPLETED | OUTPATIENT
Start: 2023-04-24 | End: 2023-04-24

## 2023-04-24 RX ORDER — METOPROLOL TARTRATE 5 MG/5ML
5 INJECTION INTRAVENOUS EVERY 6 HOURS
Status: DISCONTINUED | OUTPATIENT
Start: 2023-04-24 | End: 2023-04-26

## 2023-04-24 RX ORDER — MAGNESIUM HYDROXIDE/ALUMINUM HYDROXICE/SIMETHICONE 120; 1200; 1200 MG/30ML; MG/30ML; MG/30ML
30 SUSPENSION ORAL 4 TIMES DAILY PRN
Status: DISCONTINUED | OUTPATIENT
Start: 2023-04-24 | End: 2023-04-28

## 2023-04-24 NOTE — CDS QUERY
Acute Impaired Renal Function  CLINICAL DOCUMENTATION CLARIFICATION FORM    Dear Doctor Diomedes Palma:  Documentation (provided below) includes acutely impaired renal function. For accurate ICD-10-CM code assignment to reflect severity of illness and risk of mortality,   PLEASE (X) ALL DIAGNOSES THAT APPLY TO: Acute Renal Failure  _______________________________________________________________  SELECTION BY PROVIDER ONLY  Acute Kidney Failure with (X  ) Acute Tubular Necrosis (ATN)  Acute Kidney Failure without (  ) Acute Tubular Necrosis (ATN)  (  ) Other (please specify)   (  ) Unable to Determine (please comment)        Clinical Indicators:  Abnormal Labs (BUN/Creatinine)  BUN 50/ 52;  Creatinine 3.63/ 2.83  Hypotension: 88/60 ; 93/67 ; 93/52  4/22 ED provider note- presents with generalized weakness, lightheadedness with standing, nausea and diarrhea  Metabolic acidosis (ED note)  Risk Factors:  Dehydration  Hx of CKD  Treatments:  IV Fluids  Nephro consult      If you have any questions, please contact Clinical : Socorro Garcia RN CDS  at [address] [place], [place] [email or web address]  Thank You!     THIS FORM IS A PERMANENT PART OF THE MEDICAL RECORD

## 2023-04-24 NOTE — HOME CARE LIAISON
Received referral via [facility] for Home Health. Spoke w/ pt who is currently declining HH. Notified SW/Ekaterina.

## 2023-04-24 NOTE — PLAN OF CARE
New onset chest pain, stat EKG ordered, troponin negative, cardiology aware, likely epigastric heart burn, ordered an antiacid. Will perform EGD scope tomorrow.  Advanced diet to full liquids and NPO at midnight      Problem: Patient Centered Care  Goal: Patient preferences are identified and integrated in the patient's plan of care  Description: Interventions:  - What would you like us to know as we care for you?   - Provide timely, complete, and accurate information to patient/family  - Incorporate patient and family knowledge, values, beliefs, and cultural backgrounds into the planning and delivery of care  - Encourage patient/family to participate in care and decision-making at the level they choose  - Honor patient and family perspectives and choices  Outcome: Progressing

## 2023-04-24 NOTE — CM/SW NOTE
Department  notified of request for [facility], marie referrals started. Assigned CM/SW to follow up with pt/family on further discharge planning.      Khushbu Sharp   April 24, 2023   15:22

## 2023-04-25 ENCOUNTER — ANESTHESIA EVENT (OUTPATIENT)
Dept: ENDOSCOPY | Facility: HOSPITAL | Age: 78
DRG: 871 | End: 2023-04-25
Payer: MEDICARE

## 2023-04-25 ENCOUNTER — ANESTHESIA (OUTPATIENT)
Dept: ENDOSCOPY | Facility: HOSPITAL | Age: 78
DRG: 871 | End: 2023-04-25
Payer: MEDICARE

## 2023-04-25 LAB
ALBUMIN SERPL-MCNC: 2 G/DL (ref 3.4–5)
ANION GAP SERPL CALC-SCNC: 10 MMOL/L (ref 0–18)
BASOPHILS # BLD AUTO: 0.02 X10(3) UL (ref 0–0.2)
BASOPHILS NFR BLD AUTO: 0.2 %
BUN BLD-MCNC: 52 MG/DL (ref 7–18)
BUN/CREAT SERPL: 32.7 (ref 10–20)
CALCIUM BLD-MCNC: 9.1 MG/DL (ref 8.5–10.1)
CHLORIDE SERPL-SCNC: 105 MMOL/L (ref 98–112)
CO2 SERPL-SCNC: 22 MMOL/L (ref 21–32)
CREAT BLD-MCNC: 1.59 MG/DL
DEPRECATED RDW RBC AUTO: 50.5 FL (ref 35.1–46.3)
EOSINOPHIL # BLD AUTO: 0.08 X10(3) UL (ref 0–0.7)
EOSINOPHIL NFR BLD AUTO: 0.9 %
ERYTHROCYTE [DISTWIDTH] IN BLOOD BY AUTOMATED COUNT: 15.3 % (ref 11–15)
GFR SERPLBLD BASED ON 1.73 SQ M-ARVRAT: 33 ML/MIN/1.73M2 (ref 60–?)
GLUCOSE BLD-MCNC: 108 MG/DL (ref 70–99)
HCT VFR BLD AUTO: 25.3 %
HGB BLD-MCNC: 8.3 G/DL
IMM GRANULOCYTES # BLD AUTO: 0.14 X10(3) UL (ref 0–1)
IMM GRANULOCYTES NFR BLD: 1.5 %
LYMPHOCYTES # BLD AUTO: 1.02 X10(3) UL (ref 1–4)
LYMPHOCYTES NFR BLD AUTO: 11.2 %
MAGNESIUM SERPL-MCNC: 2.5 MG/DL (ref 1.6–2.6)
MCH RBC QN AUTO: 29.7 PG (ref 26–34)
MCHC RBC AUTO-ENTMCNC: 32.8 G/DL (ref 31–37)
MCV RBC AUTO: 90.7 FL
MONOCYTES # BLD AUTO: 0.92 X10(3) UL (ref 0.1–1)
MONOCYTES NFR BLD AUTO: 10.1 %
NEUTROPHILS # BLD AUTO: 6.93 X10 (3) UL (ref 1.5–7.7)
NEUTROPHILS # BLD AUTO: 6.93 X10(3) UL (ref 1.5–7.7)
NEUTROPHILS NFR BLD AUTO: 76.1 %
OSMOLALITY SERPL CALC.SUM OF ELEC: 299 MOSM/KG (ref 275–295)
PHOSPHATE SERPL-MCNC: 2.4 MG/DL (ref 2.5–4.9)
PLATELET # BLD AUTO: 105 10(3)UL (ref 150–450)
PLATELET MORPHOLOGY: NORMAL
POTASSIUM SERPL-SCNC: 4.2 MMOL/L (ref 3.5–5.1)
POTASSIUM SERPL-SCNC: 4.2 MMOL/L (ref 3.5–5.1)
RBC # BLD AUTO: 2.79 X10(6)UL
SODIUM SERPL-SCNC: 137 MMOL/L (ref 136–145)
WBC # BLD AUTO: 9.1 X10(3) UL (ref 4–11)

## 2023-04-25 PROCEDURE — 0W3P8ZZ CONTROL BLEEDING IN GASTROINTESTINAL TRACT, VIA NATURAL OR ARTIFICIAL OPENING ENDOSCOPIC: ICD-10-PCS | Performed by: STUDENT IN AN ORGANIZED HEALTH CARE EDUCATION/TRAINING PROGRAM

## 2023-04-25 PROCEDURE — 99233 SBSQ HOSP IP/OBS HIGH 50: CPT | Performed by: INTERNAL MEDICINE

## 2023-04-25 PROCEDURE — 43255 EGD CONTROL BLEEDING ANY: CPT | Performed by: STUDENT IN AN ORGANIZED HEALTH CARE EDUCATION/TRAINING PROGRAM

## 2023-04-25 RX ORDER — SODIUM CHLORIDE, SODIUM LACTATE, POTASSIUM CHLORIDE, CALCIUM CHLORIDE 600; 310; 30; 20 MG/100ML; MG/100ML; MG/100ML; MG/100ML
INJECTION, SOLUTION INTRAVENOUS CONTINUOUS
Status: DISCONTINUED | OUTPATIENT
Start: 2023-04-25 | End: 2023-04-26

## 2023-04-25 RX ORDER — SODIUM CHLORIDE 9 MG/ML
INJECTION, SOLUTION INTRAVENOUS CONTINUOUS PRN
Status: DISCONTINUED | OUTPATIENT
Start: 2023-04-25 | End: 2023-04-25 | Stop reason: SURG

## 2023-04-25 RX ORDER — NALOXONE HYDROCHLORIDE 0.4 MG/ML
80 INJECTION, SOLUTION INTRAMUSCULAR; INTRAVENOUS; SUBCUTANEOUS AS NEEDED
Status: ACTIVE | OUTPATIENT
Start: 2023-04-25 | End: 2023-04-26

## 2023-04-25 RX ORDER — LIDOCAINE HYDROCHLORIDE 10 MG/ML
INJECTION, SOLUTION EPIDURAL; INFILTRATION; INTRACAUDAL; PERINEURAL AS NEEDED
Status: DISCONTINUED | OUTPATIENT
Start: 2023-04-25 | End: 2023-04-25 | Stop reason: SURG

## 2023-04-25 RX ORDER — SODIUM CHLORIDE 9 MG/ML
INJECTION, SOLUTION INTRAVENOUS CONTINUOUS
Status: DISCONTINUED | OUTPATIENT
Start: 2023-04-25 | End: 2023-04-28

## 2023-04-25 RX ADMIN — LIDOCAINE HYDROCHLORIDE 50 MG: 10 INJECTION, SOLUTION EPIDURAL; INFILTRATION; INTRACAUDAL; PERINEURAL at 16:37:00

## 2023-04-25 RX ADMIN — SODIUM CHLORIDE: 9 INJECTION, SOLUTION INTRAVENOUS at 16:36:00

## 2023-04-25 NOTE — INTERVAL H&P NOTE
Pre-op Diagnosis: melena    The above referenced H&P was reviewed by Radha Sommer MD on 4/25/2023, the patient was examined and no significant changes have occurred in the patient's condition since the H&P was performed. I discussed with the patient and/or legal representative the potential benefits, risks and side effects of this procedure; the likelihood of the patient achieving goals; and potential problems that might occur during recuperation. I discussed reasonable alternatives to the procedure, including risks, benefits and side effects related to the alternatives and risks related to not receiving this procedure. We will proceed with procedure as planned.

## 2023-04-25 NOTE — ANESTHESIA POSTPROCEDURE EVALUATION
Patient: Dianne Jones    Procedure Summary     Date: 04/25/23 Room / Location: [address] [place] [address] ENDOSCOPY 01 / [facility] ENDOSCOPY    Anesthesia Start: 7836 Anesthesia Stop:     Procedure: ESOPHAGOGASTRODUODENOSCOPY (EGD) Diagnosis: (duodenal AVM)    Surgeons: uAstin Lopez MD Anesthesiologist: Hillary Velázquez CRNA    Anesthesia Type: MAC, general ASA Status: 4          Anesthesia Type: MAC, general    Vitals Value Taken Time   BP 89/60 04/25/23 1653   Temp  04/25/23 1654   Pulse 101 04/25/23 1653   Resp 27 04/25/23 1653   SpO2 99 % 04/25/23 1653       [facility] AN Post Evaluation:   Patient Evaluated in Patient location: endo 30. Patient Participation: complete - patient participated  Level of Consciousness: sleepy but conscious  Pain Score: 0  Pain Management: adequate  Airway Patency:patent  Dental exam unchanged from preop  Yes    Respiratory Status: room air      Yulisa Andre.  Amena King CRNA  4/25/2023 4:54 PM

## 2023-04-25 NOTE — PLAN OF CARE
Ms. Aayush Zamudio did well overnight; pressures soft initially then improved. Afib, rates controlled between . No acute events overnight. Reports tenderness and pain to left side of neck; pain medication given PRN. Bicarb drip still infusing. NPO status maintained overnight.      Problem: Patient Centered Care  Goal: Patient preferences are identified and integrated in the patient's plan of care  Description: Interventions:  - What would you like us to know as we care for you?   - Provide timely, complete, and accurate information to patient/family  - Incorporate patient and family knowledge, values, beliefs, and cultural backgrounds into the planning and delivery of care  - Encourage patient/family to participate in care and decision-making at the level they choose  - Honor patient and family perspectives and choices  Outcome: Progressing     Problem: Patient/Family Goals  Goal: Patient/Family Long Term Goal  Description: Patient's Long Term Goal:     Interventions:  -   - See additional Care Plan goals for specific interventions  Outcome: Progressing  Goal: Patient/Family Short Term Goal  Description: Patient's Short Term Goal:     Interventions:   -   - See additional Care Plan goals for specific interventions  Outcome: Progressing     Problem: HEMATOLOGIC - ADULT  Goal: Maintains hematologic stability  Description: INTERVENTIONS  - Assess for signs and symptoms of bleeding or hemorrhage  - Monitor labs and vital signs for trends  - Administer supportive blood products/factors, fluids and medications as ordered and appropriate  - Administer supportive blood products/factors as ordered and appropriate  Outcome: Progressing     Problem: PAIN - ADULT  Goal: Verbalizes/displays adequate comfort level or patient's stated pain goal  Description: INTERVENTIONS:  - Encourage pt to monitor pain and request assistance  - Assess pain using appropriate pain scale  - Administer analgesics based on type and severity of pain and evaluate response  - Implement non-pharmacological measures as appropriate and evaluate response  - Consider cultural and social influences on pain and pain management  - Manage/alleviate anxiety  - Utilize distraction and/or relaxation techniques  - Monitor for opioid side effects  - Notify MD/LIP if interventions unsuccessful or patient reports new pain  - Anticipate increased pain with activity and pre-medicate as appropriate  Outcome: Progressing     Problem: Impaired Activities of Daily Living  Goal: Achieve highest/safest level of independence in self care  Description: Interventions:  - Assess ability and encourage patient to participate in ADLs to maximize function  - Promote sitting position while performing ADLs such as feeding, grooming, and bathing  - Educate and encourage patient/family in tolerated functional activity level and precautions during self-care    Outcome: Progressing     Problem: CARDIOVASCULAR - ADULT  Goal: Maintains optimal cardiac output and hemodynamic stability  Description: INTERVENTIONS:  - Monitor vital signs, rhythm, and trends  - Monitor for bleeding, hypotension and signs of decreased cardiac output  - Evaluate effectiveness of vasoactive medications to optimize hemodynamic stability  - Monitor arterial and/or venous puncture sites for bleeding and/or hematoma  - Assess quality of pulses, skin color and temperature  - Assess for signs of decreased coronary artery perfusion - ex.  Angina  - Evaluate fluid balance, assess for edema, trend weights  Outcome: Progressing  Goal: Absence of cardiac arrhythmias or at baseline  Description: INTERVENTIONS:  - Continuous cardiac monitoring, monitor vital signs, obtain 12 lead EKG if indicated  - Evaluate effectiveness of antiarrhythmic and heart rate control medications as ordered  - Initiate emergency measures for life threatening arrhythmias  - Monitor electrolytes and administer replacement therapy as ordered  Outcome: Progressing Problem: GASTROINTESTINAL - ADULT  Goal: Maintains or returns to baseline bowel function  Description: INTERVENTIONS:  - Assess bowel function  - Maintain adequate hydration with IV or PO as ordered and tolerated  - Evaluate effectiveness of GI medications  - Encourage mobilization and activity  - Obtain nutritional consult as needed  - Establish a toileting routine/schedule  - Consider collaborating with pharmacy to review patient's medication profile  Outcome: Progressing     Problem: METABOLIC/FLUID AND ELECTROLYTES - ADULT  Goal: Hemodynamic stability and optimal renal function maintained  Description: INTERVENTIONS:  - Monitor labs and assess for signs and symptoms of volume excess or deficit  - Monitor intake, output and patient weight  - Monitor urine specific gravity, serum osmolarity and serum sodium as indicated or ordered  - Monitor response to interventions for patient's volume status, including labs, urine output, blood pressure (other measures as available)  - Encourage oral intake as appropriate  - Instruct patient on fluid and nutrition restrictions as appropriate  Outcome: Progressing

## 2023-04-25 NOTE — PLAN OF CARE
EGD completed, Duodenal AVM clipped. IVF modified and electrolytes replaced per Nephrology. Okay to transfer off PCCU. Maintained patient safety.    Problem: Patient Centered Care  Goal: Patient preferences are identified and integrated in the patient's plan of care  Description: Interventions:  - What would you like us to know as we care for you?   - Provide timely, complete, and accurate information to patient/family  - Incorporate patient and family knowledge, values, beliefs, and cultural backgrounds into the planning and delivery of care  - Encourage patient/family to participate in care and decision-making at the level they choose  - Honor patient and family perspectives and choices  Outcome: Progressing     Problem: Patient/Family Goals  Goal: Patient/Family Long Term Goal  Description: Patient's Long Term Goal:     Interventions:  -   - See additional Care Plan goals for specific interventions  Outcome: Progressing  Goal: Patient/Family Short Term Goal  Description: Patient's Short Term Goal:     Interventions:   -   - See additional Care Plan goals for specific interventions  Outcome: Progressing     Problem: HEMATOLOGIC - ADULT  Goal: Maintains hematologic stability  Description: INTERVENTIONS  - Assess for signs and symptoms of bleeding or hemorrhage  - Monitor labs and vital signs for trends  - Administer supportive blood products/factors, fluids and medications as ordered and appropriate  - Administer supportive blood products/factors as ordered and appropriate  Outcome: Progressing     Problem: PAIN - ADULT  Goal: Verbalizes/displays adequate comfort level or patient's stated pain goal  Description: INTERVENTIONS:  - Encourage pt to monitor pain and request assistance  - Assess pain using appropriate pain scale  - Administer analgesics based on type and severity of pain and evaluate response  - Implement non-pharmacological measures as appropriate and evaluate response  - Consider cultural and social influences on pain and pain management  - Manage/alleviate anxiety  - Utilize distraction and/or relaxation techniques  - Monitor for opioid side effects  - Notify MD/LIP if interventions unsuccessful or patient reports new pain  - Anticipate increased pain with activity and pre-medicate as appropriate  Outcome: Progressing     Problem: Impaired Activities of Daily Living  Goal: Achieve highest/safest level of independence in self care  Description: Interventions:  - Assess ability and encourage patient to participate in ADLs to maximize function  - Promote sitting position while performing ADLs such as feeding, grooming, and bathing  - Educate and encourage patient/family in tolerated functional activity level and precautions during self-care    Outcome: Progressing     Problem: CARDIOVASCULAR - ADULT  Goal: Maintains optimal cardiac output and hemodynamic stability  Description: INTERVENTIONS:  - Monitor vital signs, rhythm, and trends  - Monitor for bleeding, hypotension and signs of decreased cardiac output  - Evaluate effectiveness of vasoactive medications to optimize hemodynamic stability  - Monitor arterial and/or venous puncture sites for bleeding and/or hematoma  - Assess quality of pulses, skin color and temperature  - Assess for signs of decreased coronary artery perfusion - ex.  Angina  - Evaluate fluid balance, assess for edema, trend weights  Outcome: Progressing  Goal: Absence of cardiac arrhythmias or at baseline  Description: INTERVENTIONS:  - Continuous cardiac monitoring, monitor vital signs, obtain 12 lead EKG if indicated  - Evaluate effectiveness of antiarrhythmic and heart rate control medications as ordered  - Initiate emergency measures for life threatening arrhythmias  - Monitor electrolytes and administer replacement therapy as ordered  Outcome: Progressing     Problem: GASTROINTESTINAL - ADULT  Goal: Maintains or returns to baseline bowel function  Description: INTERVENTIONS:  - Assess bowel function  - Maintain adequate hydration with IV or PO as ordered and tolerated  - Evaluate effectiveness of GI medications  - Encourage mobilization and activity  - Obtain nutritional consult as needed  - Establish a toileting routine/schedule  - Consider collaborating with pharmacy to review patient's medication profile  Outcome: Progressing     Problem: METABOLIC/FLUID AND ELECTROLYTES - ADULT  Goal: Hemodynamic stability and optimal renal function maintained  Description: INTERVENTIONS:  - Monitor labs and assess for signs and symptoms of volume excess or deficit  - Monitor intake, output and patient weight  - Monitor urine specific gravity, serum osmolarity and serum sodium as indicated or ordered  - Monitor response to interventions for patient's volume status, including labs, urine output, blood pressure (other measures as available)  - Encourage oral intake as appropriate  - Instruct patient on fluid and nutrition restrictions as appropriate  Outcome: Progressing

## 2023-04-25 NOTE — OPERATIVE REPORT
ESOPHAGOGASTRODUODENOSCOPY (EGD) REPORT    Willa Wells     [birth year] Age 69year old   PCP Jett West DO Endoscopist Kevin Gillette MD       Date of procedure: 23    Procedure: EGD w/ control of bleeding    Pre-operative diagnosis: melena    Post-operative diagnosis: duodenal bulb erosion vs avm, hiatal hernia. Medications: MAC    Complications: none    Procedure:  Informed consent was obtained from the patient after the risks of the procedure were discussed, including but not limited to bleeding, perforation, aspiration, infection, or possibility of a missed lesion. After discussions of the risks/benefits and alternatives to this procedure, as well as the planned sedation, the patient was placed in the left lateral decubitus position and begun on continuous blood pressure pulse oximetry and EKG monitoring and this was maintained throughout the procedure. Once an adequate level of sedation was obtained a bite block was placed. Then the lubricated tip of the [id number] diagnostic video upper endoscope was inserted and advanced using direct visualization into the posterior pharynx and ultimately into the esophagus. The scope was then advanced through the stomach and to the second portion of the duodenum. Complications: None    Findings:      1. Esophagus: The squamocolumnar junction was noted at 38 cm and appeared regular. The diaphragmatic pinch was noted noted at 40 cm from the incisors. 2 cm hiatal hernia. The esophageal mucosa appeared healthy and normal. There was no endoscopic findings of esophagitis, stricture or Nielson's esophagus. There was no blood seen in the entire esophagus. Within the hiatal hernia there were no erosions or other lesions visualized. 2. Stomach: The stomach distended normally. Normal rugal folds were seen. The pylorus was patent. Retroflexion revealed a normal fundus.  The gastric mucosa appeared healthy and normal. No ulcers or erosions were seen. No blood was seen in the entire stomach. 3. Duodenum: In the duodenal bulb, there was small break in the mucosa that was oozing with water irrigation. The second portion of the duodenum was unremarkable. There was yellow fluid throughout the examined duodenum. The scope was then brought back to the duodenal bulb and mucosal break was visualized with slow ooze. The surroudning mucosa appeared healthy and normal. One hemoclip was placed and the oozing stopped. Impression:  -Esophagus: Normal mucosa, small hiatal hernia. -Stomach: Unremarkable. No source of bleeding seen. -Duodenum: Small break in the mucosa in the duodenal bulb with active oozing. Possibly an AVM. One hemoclip was placed with cessation in oozing. This is the likely source of melena. Recommend:  1. Will give another dose of IV Pantoprazole now and continue IV Pantoprazole 40mg BID. 2. Clear liquid diet tonight, can advance tomorrow if tolerates. 3. Pending hemoglobin tomorrow, can likely restart Eliquis.    >>>If tissue was sampled/removed and you have not received your pathology results either by phone or letter within 2 weeks, please call our office at [phone].     Specimens:  none    Blood loss: <1 ml

## 2023-04-26 LAB
ALBUMIN SERPL-MCNC: 2 G/DL (ref 3.4–5)
ANION GAP SERPL CALC-SCNC: 10 MMOL/L (ref 0–18)
ATRIAL RATE: 109 BPM
BASOPHILS # BLD AUTO: 0.04 X10(3) UL (ref 0–0.2)
BASOPHILS NFR BLD AUTO: 0.4 %
BILIRUB UR QL: NEGATIVE
BUN BLD-MCNC: 48 MG/DL (ref 7–18)
BUN/CREAT SERPL: 35 (ref 10–20)
CALCIUM BLD-MCNC: 9 MG/DL (ref 8.5–10.1)
CHLORIDE SERPL-SCNC: 107 MMOL/L (ref 98–112)
CLARITY UR: CLEAR
CO2 SERPL-SCNC: 21 MMOL/L (ref 21–32)
COLOR UR: COLORLESS
CREAT BLD-MCNC: 1.37 MG/DL
DEPRECATED RDW RBC AUTO: 50.3 FL (ref 35.1–46.3)
EOSINOPHIL # BLD AUTO: 0.12 X10(3) UL (ref 0–0.7)
EOSINOPHIL NFR BLD AUTO: 1.2 %
ERYTHROCYTE [DISTWIDTH] IN BLOOD BY AUTOMATED COUNT: 15.2 % (ref 11–15)
GFR SERPLBLD BASED ON 1.73 SQ M-ARVRAT: 40 ML/MIN/1.73M2 (ref 60–?)
GLUCOSE BLD-MCNC: 98 MG/DL (ref 70–99)
GLUCOSE UR-MCNC: NORMAL MG/DL
HCT VFR BLD AUTO: 25.2 %
HGB BLD-MCNC: 8.1 G/DL
HGB UR QL STRIP.AUTO: NEGATIVE
IMM GRANULOCYTES # BLD AUTO: 0.41 X10(3) UL (ref 0–1)
IMM GRANULOCYTES NFR BLD: 4.3 %
KETONES UR-MCNC: NEGATIVE MG/DL
LEUKOCYTE ESTERASE UR QL STRIP.AUTO: NEGATIVE
LYMPHOCYTES # BLD AUTO: 1.91 X10(3) UL (ref 1–4)
LYMPHOCYTES NFR BLD AUTO: 19.9 %
MAGNESIUM SERPL-MCNC: 2.4 MG/DL (ref 1.6–2.6)
MCH RBC QN AUTO: 29.3 PG (ref 26–34)
MCHC RBC AUTO-ENTMCNC: 32.1 G/DL (ref 31–37)
MCV RBC AUTO: 91.3 FL
MONOCYTES # BLD AUTO: 1.23 X10(3) UL (ref 0.1–1)
MONOCYTES NFR BLD AUTO: 12.8 %
NEUTROPHILS # BLD AUTO: 5.91 X10 (3) UL (ref 1.5–7.7)
NEUTROPHILS # BLD AUTO: 5.91 X10(3) UL (ref 1.5–7.7)
NEUTROPHILS NFR BLD AUTO: 61.4 %
NITRITE UR QL STRIP.AUTO: NEGATIVE
OSMOLALITY SERPL CALC.SUM OF ELEC: 299 MOSM/KG (ref 275–295)
P-R INTERVAL: 264 MS
PH UR: 6 [PH] (ref 5–8)
PHOSPHATE SERPL-MCNC: 3.9 MG/DL (ref 2.5–4.9)
PLATELET # BLD AUTO: 142 10(3)UL (ref 150–450)
POTASSIUM SERPL-SCNC: 4.1 MMOL/L (ref 3.5–5.1)
PROT UR-MCNC: NEGATIVE MG/DL
Q-T INTERVAL: 332 MS
QRS DURATION: 100 MS
QTC CALCULATION (BEZET): 447 MS
R AXIS: -25 DEGREES
RBC # BLD AUTO: 2.76 X10(6)UL
SODIUM SERPL-SCNC: 138 MMOL/L (ref 136–145)
SP GR UR STRIP: 1.01 (ref 1–1.03)
T AXIS: 31 DEGREES
UROBILINOGEN UR STRIP-ACNC: NORMAL
VENTRICULAR RATE: 109 BPM
WBC # BLD AUTO: 9.6 X10(3) UL (ref 4–11)

## 2023-04-26 PROCEDURE — 99232 SBSQ HOSP IP/OBS MODERATE 35: CPT | Performed by: REGISTERED NURSE

## 2023-04-26 PROCEDURE — 99233 SBSQ HOSP IP/OBS HIGH 50: CPT | Performed by: HOSPITALIST

## 2023-04-26 PROCEDURE — 99232 SBSQ HOSP IP/OBS MODERATE 35: CPT | Performed by: INTERNAL MEDICINE

## 2023-04-26 RX ORDER — FUROSEMIDE 10 MG/ML
40 INJECTION INTRAMUSCULAR; INTRAVENOUS ONCE
Status: COMPLETED | OUTPATIENT
Start: 2023-04-26 | End: 2023-04-26

## 2023-04-26 RX ORDER — CARVEDILOL 6.25 MG/1
6.25 TABLET ORAL 2 TIMES DAILY WITH MEALS
Status: DISCONTINUED | OUTPATIENT
Start: 2023-04-26 | End: 2023-04-28

## 2023-04-26 NOTE — PLAN OF CARE
Problem: Patient Centered Care  Goal: Patient preferences are identified and integrated in the patient's plan of care  Description: Interventions:  - What would you like us to know as we care for you?   - Provide timely, complete, and accurate information to patient/family  - Incorporate patient and family knowledge, values, beliefs, and cultural backgrounds into the planning and delivery of care  - Encourage patient/family to participate in care and decision-making at the level they choose  - Honor patient and family perspectives and choices  Outcome: Progressing     Problem: Patient/Family Goals  Goal: Patient/Family Long Term Goal  Description: Patient's Long Term Goal:     Interventions:  -   - See additional Care Plan goals for specific interventions  Outcome: Progressing  Goal: Patient/Family Short Term Goal  Description: Patient's Short Term Goal:     Interventions:   -   - See additional Care Plan goals for specific interventions  Outcome: Progressing     Problem: HEMATOLOGIC - ADULT  Goal: Maintains hematologic stability  Description: INTERVENTIONS  - Assess for signs and symptoms of bleeding or hemorrhage  - Monitor labs and vital signs for trends  - Administer supportive blood products/factors, fluids and medications as ordered and appropriate  - Administer supportive blood products/factors as ordered and appropriate  Outcome: Progressing     Problem: PAIN - ADULT  Goal: Verbalizes/displays adequate comfort level or patient's stated pain goal  Description: INTERVENTIONS:  - Encourage pt to monitor pain and request assistance  - Assess pain using appropriate pain scale  - Administer analgesics based on type and severity of pain and evaluate response  - Implement non-pharmacological measures as appropriate and evaluate response  - Consider cultural and social influences on pain and pain management  - Manage/alleviate anxiety  - Utilize distraction and/or relaxation techniques  - Monitor for opioid side effects  - Notify MD/LIP if interventions unsuccessful or patient reports new pain  - Anticipate increased pain with activity and pre-medicate as appropriate  Outcome: Progressing     Problem: Impaired Activities of Daily Living  Goal: Achieve highest/safest level of independence in self care  Description: Interventions:  - Assess ability and encourage patient to participate in ADLs to maximize function  - Promote sitting position while performing ADLs such as feeding, grooming, and bathing  - Educate and encourage patient/family in tolerated functional activity level and precautions during self-care    Outcome: Progressing     Problem: CARDIOVASCULAR - ADULT  Goal: Maintains optimal cardiac output and hemodynamic stability  Description: INTERVENTIONS:  - Monitor vital signs, rhythm, and trends  - Monitor for bleeding, hypotension and signs of decreased cardiac output  - Evaluate effectiveness of vasoactive medications to optimize hemodynamic stability  - Monitor arterial and/or venous puncture sites for bleeding and/or hematoma  - Assess quality of pulses, skin color and temperature  - Assess for signs of decreased coronary artery perfusion - ex.  Angina  - Evaluate fluid balance, assess for edema, trend weights  Outcome: Progressing  Goal: Absence of cardiac arrhythmias or at baseline  Description: INTERVENTIONS:  - Continuous cardiac monitoring, monitor vital signs, obtain 12 lead EKG if indicated  - Evaluate effectiveness of antiarrhythmic and heart rate control medications as ordered  - Initiate emergency measures for life threatening arrhythmias  - Monitor electrolytes and administer replacement therapy as ordered  Outcome: Progressing     Problem: GASTROINTESTINAL - ADULT  Goal: Maintains or returns to baseline bowel function  Description: INTERVENTIONS:  - Assess bowel function  - Maintain adequate hydration with IV or PO as ordered and tolerated  - Evaluate effectiveness of GI medications  - Encourage mobilization and activity  - Obtain nutritional consult as needed  - Establish a toileting routine/schedule  - Consider collaborating with pharmacy to review patient's medication profile  Outcome: Progressing     Problem: METABOLIC/FLUID AND ELECTROLYTES - ADULT  Goal: Hemodynamic stability and optimal renal function maintained  Description: INTERVENTIONS:  - Monitor labs and assess for signs and symptoms of volume excess or deficit  - Monitor intake, output and patient weight  - Monitor urine specific gravity, serum osmolarity and serum sodium as indicated or ordered  - Monitor response to interventions for patient's volume status, including labs, urine output, blood pressure (other measures as available)  - Encourage oral intake as appropriate  - Instruct patient on fluid and nutrition restrictions as appropriate  Outcome: Progressing

## 2023-04-26 NOTE — PROGRESS NOTES
Double RN Skin check done prior to transfer off unit;  Skin checked performed by this RN and  Juana RN. Wounds are as follows: none, skin intact. This RN will remain available for any questions or concerns.

## 2023-04-26 NOTE — PLAN OF CARE
Problem: Patient Centered Care  Goal: Patient preferences are identified and integrated in the patient's plan of care  Description: Interventions:  - What would you like us to know as we care for you?  From home with    - Provide timely, complete, and accurate information to patient/family  - Incorporate patient and family knowledge, values, beliefs, and cultural backgrounds into the planning and delivery of care  - Encourage patient/family to participate in care and decision-making at the level they choose  - Honor patient and family perspectives and choices  Outcome: Progressing     Problem: Patient/Family Goals  Goal: Patient/Family Long Term Goal  Description: Patient's Long Term Goal: return home     Interventions:  - follow doctors recommendations   - See additional Care Plan goals for specific interventions  Outcome: Progressing  Goal: Patient/Family Short Term Goal  Description: Patient's Short Term Goal: stop GI bleeding     Interventions:   - GI on consult  - EGD done   - monitor vitals signs and labs   - See additional Care Plan goals for specific interventions  Outcome: Progressing     Problem: HEMATOLOGIC - ADULT  Goal: Maintains hematologic stability  Description: INTERVENTIONS  - Assess for signs and symptoms of bleeding or hemorrhage  - Monitor labs and vital signs for trends  - Administer supportive blood products/factors, fluids and medications as ordered and appropriate  - Administer supportive blood products/factors as ordered and appropriate  Outcome: Progressing     Problem: PAIN - ADULT  Goal: Verbalizes/displays adequate comfort level or patient's stated pain goal  Description: INTERVENTIONS:  - Encourage pt to monitor pain and request assistance  - Assess pain using appropriate pain scale  - Administer analgesics based on type and severity of pain and evaluate response  - Implement non-pharmacological measures as appropriate and evaluate response  - Consider cultural and social influences on pain and pain management  - Manage/alleviate anxiety  - Utilize distraction and/or relaxation techniques  - Monitor for opioid side effects  - Notify MD/LIP if interventions unsuccessful or patient reports new pain  - Anticipate increased pain with activity and pre-medicate as appropriate  Outcome: Progressing     Problem: Impaired Activities of Daily Living  Goal: Achieve highest/safest level of independence in self care  Description: Interventions:  - Assess ability and encourage patient to participate in ADLs to maximize function  - Promote sitting position while performing ADLs such as feeding, grooming, and bathing  - Educate and encourage patient/family in tolerated functional activity level and precautions during self-care  Outcome: Progressing     Problem: CARDIOVASCULAR - ADULT  Goal: Maintains optimal cardiac output and hemodynamic stability  Description: INTERVENTIONS:  - Monitor vital signs, rhythm, and trends  - Monitor for bleeding, hypotension and signs of decreased cardiac output  - Evaluate effectiveness of vasoactive medications to optimize hemodynamic stability  - Monitor arterial and/or venous puncture sites for bleeding and/or hematoma  - Assess quality of pulses, skin color and temperature  - Assess for signs of decreased coronary artery perfusion - ex.  Angina  - Evaluate fluid balance, assess for edema, trend weights  Outcome: Progressing  Goal: Absence of cardiac arrhythmias or at baseline  Description: INTERVENTIONS:  - Continuous cardiac monitoring, monitor vital signs, obtain 12 lead EKG if indicated  - Evaluate effectiveness of antiarrhythmic and heart rate control medications as ordered  - Initiate emergency measures for life threatening arrhythmias  - Monitor electrolytes and administer replacement therapy as ordered  Outcome: Progressing     Problem: GASTROINTESTINAL - ADULT  Goal: Maintains or returns to baseline bowel function  Description: INTERVENTIONS:  - Assess bowel function  - Maintain adequate hydration with IV or PO as ordered and tolerated  - Evaluate effectiveness of GI medications  - Encourage mobilization and activity  - Obtain nutritional consult as needed  - Establish a toileting routine/schedule  - Consider collaborating with pharmacy to review patient's medication profile  Outcome: Progressing     Problem: METABOLIC/FLUID AND ELECTROLYTES - ADULT  Goal: Hemodynamic stability and optimal renal function maintained  Description: INTERVENTIONS:  - Monitor labs and assess for signs and symptoms of volume excess or deficit  - Monitor intake, output and patient weight  - Monitor urine specific gravity, serum osmolarity and serum sodium as indicated or ordered  - Monitor response to interventions for patient's volume status, including labs, urine output, blood pressure (other measures as available)  - Encourage oral intake as appropriate  - Instruct patient on fluid and nutrition restrictions as appropriate  Outcome: Progressing   Patient alert and oriented x 4 , transferred from CCU , vital signs stable , Norco prn given for neck pain, clear liquid diet, fall precautions in place, call light within reach , seizure precautions in place

## 2023-04-27 ENCOUNTER — APPOINTMENT (OUTPATIENT)
Dept: GENERAL RADIOLOGY | Facility: HOSPITAL | Age: 78
DRG: 871 | End: 2023-04-27
Attending: INTERNAL MEDICINE
Payer: MEDICARE

## 2023-04-27 LAB
ALBUMIN SERPL-MCNC: 2.3 G/DL (ref 3.4–5)
ANION GAP SERPL CALC-SCNC: 9 MMOL/L (ref 0–18)
BASOPHILS # BLD: 0 X10(3) UL (ref 0–0.2)
BASOPHILS NFR BLD: 0 %
BUN BLD-MCNC: 39 MG/DL (ref 7–18)
BUN/CREAT SERPL: 28.3 (ref 10–20)
CALCIUM BLD-MCNC: 9.3 MG/DL (ref 8.5–10.1)
CHLORIDE SERPL-SCNC: 109 MMOL/L (ref 98–112)
CO2 SERPL-SCNC: 23 MMOL/L (ref 21–32)
CREAT BLD-MCNC: 1.38 MG/DL
DEPRECATED RDW RBC AUTO: 52.1 FL (ref 35.1–46.3)
EOSINOPHIL # BLD: 0.21 X10(3) UL (ref 0–0.7)
EOSINOPHIL NFR BLD: 2 %
ERYTHROCYTE [DISTWIDTH] IN BLOOD BY AUTOMATED COUNT: 15.3 % (ref 11–15)
GFR SERPLBLD BASED ON 1.73 SQ M-ARVRAT: 39 ML/MIN/1.73M2 (ref 60–?)
GLUCOSE BLD-MCNC: 82 MG/DL (ref 70–99)
HCT VFR BLD AUTO: 26.9 %
HGB BLD-MCNC: 8.4 G/DL
LYMPHOCYTES NFR BLD: 1.7 X10(3) UL (ref 1–4)
LYMPHOCYTES NFR BLD: 16 %
MAGNESIUM SERPL-MCNC: 2.1 MG/DL (ref 1.6–2.6)
MCH RBC QN AUTO: 29.2 PG (ref 26–34)
MCHC RBC AUTO-ENTMCNC: 31.2 G/DL (ref 31–37)
MCV RBC AUTO: 93.4 FL
MONOCYTES # BLD: 0.85 X10(3) UL (ref 0.1–1)
MONOCYTES NFR BLD: 8 %
NEUTROPHILS # BLD AUTO: 7.41 X10 (3) UL (ref 1.5–7.7)
NEUTROPHILS NFR BLD: 69 %
NEUTS BAND NFR BLD: 5 %
NEUTS HYPERSEG # BLD: 7.84 X10(3) UL (ref 1.5–7.7)
OSMOLALITY SERPL CALC.SUM OF ELEC: 300 MOSM/KG (ref 275–295)
PHOSPHATE SERPL-MCNC: 4.1 MG/DL (ref 2.5–4.9)
PLATELET # BLD AUTO: 205 10(3)UL (ref 150–450)
POTASSIUM SERPL-SCNC: 3.8 MMOL/L (ref 3.5–5.1)
RBC # BLD AUTO: 2.88 X10(6)UL
SODIUM SERPL-SCNC: 141 MMOL/L (ref 136–145)
TOTAL CELLS COUNTED BLD: 100
WBC # BLD AUTO: 10.6 X10(3) UL (ref 4–11)

## 2023-04-27 PROCEDURE — 99232 SBSQ HOSP IP/OBS MODERATE 35: CPT | Performed by: INTERNAL MEDICINE

## 2023-04-27 PROCEDURE — 70360 X-RAY EXAM OF NECK: CPT | Performed by: INTERNAL MEDICINE

## 2023-04-27 PROCEDURE — 99233 SBSQ HOSP IP/OBS HIGH 50: CPT | Performed by: INTERNAL MEDICINE

## 2023-04-27 RX ORDER — POTASSIUM CHLORIDE 20 MEQ/1
40 TABLET, EXTENDED RELEASE ORAL ONCE
Status: COMPLETED | OUTPATIENT
Start: 2023-04-27 | End: 2023-04-27

## 2023-04-27 NOTE — CM/SW NOTE
Pt confirmed that she is refusing HH w/PT at [place] despite recommendation. HH referral search cancelled in [address]. Plan: Home w/spouse pending medical clearance.     KIM Thomas    [phone]

## 2023-04-27 NOTE — PLAN OF CARE
Problem: Patient Centered Care  Goal: Patient preferences are identified and integrated in the patient's plan of care  Description: Interventions:  - What would you like us to know as we care for you?  Home with .  - Provide timely, complete, and accurate information to patient/family  - Incorporate patient and family knowledge, values, beliefs, and cultural backgrounds into the planning and delivery of care  - Encourage patient/family to participate in care and decision-making at the level they choose  - Honor patient and family perspectives and choices  Outcome: Progressing     Problem: HEMATOLOGIC - ADULT  Goal: Maintains hematologic stability  Description: INTERVENTIONS  - Assess for signs and symptoms of bleeding or hemorrhage  - Monitor labs and vital signs for trends  - Administer supportive blood products/factors, fluids and medications as ordered and appropriate  - Administer supportive blood products/factors as ordered and appropriate  Outcome: Progressing     Problem: Impaired Activities of Daily Living  Goal: Achieve highest/safest level of independence in self care  Description: Interventions:  - Assess ability and encourage patient to participate in ADLs to maximize function  - Promote sitting position while performing ADLs such as feeding, grooming, and bathing  - Educate and encourage patient/family in tolerated functional activity level and precautions during self-care  - Provide support under elbow of weak side to prevent shoulder subluxation  - Encourage patient to incorporate impaired side during daily activities to promote function  Outcome: Progressing     Problem: CARDIOVASCULAR - ADULT  Goal: Maintains optimal cardiac output and hemodynamic stability  Description: INTERVENTIONS:  - Monitor vital signs, rhythm, and trends  - Monitor for bleeding, hypotension and signs of decreased cardiac output  - Evaluate effectiveness of vasoactive medications to optimize hemodynamic stability  - Monitor arterial and/or venous puncture sites for bleeding and/or hematoma  - Assess quality of pulses, skin color and temperature  - Assess for signs of decreased coronary artery perfusion - ex.  Angina  - Evaluate fluid balance, assess for edema, trend weights  Outcome: Progressing  Goal: Absence of cardiac arrhythmias or at baseline  Description: INTERVENTIONS:  - Continuous cardiac monitoring, monitor vital signs, obtain 12 lead EKG if indicated  - Evaluate effectiveness of antiarrhythmic and heart rate control medications as ordered  - Initiate emergency measures for life threatening arrhythmias  - Monitor electrolytes and administer replacement therapy as ordered  Outcome: Progressing     Problem: GASTROINTESTINAL - ADULT  Goal: Maintains or returns to baseline bowel function  Description: INTERVENTIONS:  - Assess bowel function  - Maintain adequate hydration with IV or PO as ordered and tolerated  - Evaluate effectiveness of GI medications  - Encourage mobilization and activity  - Obtain nutritional consult as needed  - Establish a toileting routine/schedule  - Consider collaborating with pharmacy to review patient's medication profile  Outcome: Progressing     Problem: METABOLIC/FLUID AND ELECTROLYTES - ADULT  Goal: Hemodynamic stability and optimal renal function maintained  Description: INTERVENTIONS:  - Monitor labs and assess for signs and symptoms of volume excess or deficit  - Monitor intake, output and patient weight  - Monitor urine specific gravity, serum osmolarity and serum sodium as indicated or ordered  - Monitor response to interventions for patient's volume status, including labs, urine output, blood pressure (other measures as available)  - Encourage oral intake as appropriate  - Instruct patient on fluid and nutrition restrictions as appropriate  Outcome: Progressing     Problem: PAIN - ADULT  Goal: Verbalizes/displays adequate comfort level or patient's stated pain goal  Description: INTERVENTIONS:  - Encourage pt to monitor pain and request assistance  - Assess pain using appropriate pain scale  - Administer analgesics based on type and severity of pain and evaluate response  - Implement non-pharmacological measures as appropriate and evaluate response  - Consider cultural and social influences on pain and pain management  - Manage/alleviate anxiety  - Utilize distraction and/or relaxation techniques  - Monitor for opioid side effects  - Notify MD/LIP if interventions unsuccessful or patient reports new pain  - Anticipate increased pain with activity and pre-medicate as appropriate  Outcome: Progressing  Patient A/O x4. No signs of GI bleed. Per MD order urine specimen sent for UA w/reflex. She was up in the chair for meals, walking within her room with walker and x1 assist. Plan is to discharge home, patient declines [id number]. Norco given to control left shoulder pain.

## 2023-04-27 NOTE — PLAN OF CARE
Problem: Patient Centered Care  Goal: Patient preferences are identified and integrated in the patient's plan of care  Description: Interventions:  - What would you like us to know as we care for you?   - Provide timely, complete, and accurate information to patient/family  - Incorporate patient and family knowledge, values, beliefs, and cultural backgrounds into the planning and delivery of care  - Encourage patient/family to participate in care and decision-making at the level they choose  - Honor patient and family perspectives and choices  Outcome: Progressing     Problem: HEMATOLOGIC - ADULT  Goal: Maintains hematologic stability  Description: INTERVENTIONS  - Assess for signs and symptoms of bleeding or hemorrhage  - Monitor labs and vital signs for trends  - Administer supportive blood products/factors, fluids and medications as ordered and appropriate  - Administer supportive blood products/factors as ordered and appropriate  Outcome: Progressing     Problem: Impaired Activities of Daily Living  Goal: Achieve highest/safest level of independence in self care  Description: Interventions:  - Assess ability and encourage patient to participate in ADLs to maximize function  - Promote sitting position while performing ADLs such as feeding, grooming, and bathing  - Educate and encourage patient/family in tolerated functional activity level and precautions during self-care    Outcome: Progressing     Problem: CARDIOVASCULAR - ADULT  Goal: Maintains optimal cardiac output and hemodynamic stability  Description: INTERVENTIONS:  - Monitor vital signs, rhythm, and trends  - Monitor for bleeding, hypotension and signs of decreased cardiac output  - Evaluate effectiveness of vasoactive medications to optimize hemodynamic stability  - Monitor arterial and/or venous puncture sites for bleeding and/or hematoma  - Assess quality of pulses, skin color and temperature  - Assess for signs of decreased coronary artery perfusion - ex.  Angina  - Evaluate fluid balance, assess for edema, trend weights  Outcome: Progressing  Goal: Absence of cardiac arrhythmias or at baseline  Description: INTERVENTIONS:  - Continuous cardiac monitoring, monitor vital signs, obtain 12 lead EKG if indicated  - Evaluate effectiveness of antiarrhythmic and heart rate control medications as ordered  - Initiate emergency measures for life threatening arrhythmias  - Monitor electrolytes and administer replacement therapy as ordered  Outcome: Progressing     Problem: GASTROINTESTINAL - ADULT  Goal: Maintains or returns to baseline bowel function  Description: INTERVENTIONS:  - Assess bowel function  - Maintain adequate hydration with IV or PO as ordered and tolerated  - Evaluate effectiveness of GI medications  - Encourage mobilization and activity  - Obtain nutritional consult as needed  - Establish a toileting routine/schedule  - Consider collaborating with pharmacy to review patient's medication profile  Outcome: Progressing     Problem: METABOLIC/FLUID AND ELECTROLYTES - ADULT  Goal: Hemodynamic stability and optimal renal function maintained  Description: INTERVENTIONS:  - Monitor labs and assess for signs and symptoms of volume excess or deficit  - Monitor intake, output and patient weight  - Monitor urine specific gravity, serum osmolarity and serum sodium as indicated or ordered  - Monitor response to interventions for patient's volume status, including labs, urine output, blood pressure (other measures as available)  - Encourage oral intake as appropriate  - Instruct patient on fluid and nutrition restrictions as appropriate  Outcome: Progressing

## 2023-04-27 NOTE — PLAN OF CARE
Problem: Patient Centered Care  Goal: Patient preferences are identified and integrated in the patient's plan of care  Description: Interventions:  - What would you like us to know as we care for you? home  - Provide timely, complete, and accurate information to patient/family  - Incorporate patient and family knowledge, values, beliefs, and cultural backgrounds into the planning and delivery of care  - Encourage patient/family to participate in care and decision-making at the level they choose  - Honor patient and family perspectives and choices  Outcome: Progressing     Problem: Patient/Family Goals  Goal: Patient/Family Long Term Goal  Description: Patient's Long Term Goal: go home    Interventions:  - gi consult  - See additional Care Plan goals for specific interventions  Outcome: Progressing  Goal: Patient/Family Short Term Goal  Description: Patient's Short Term Goal: feel better    Interventions:   - pain meds, XR neck   - See additional Care Plan goals for specific interventions  Outcome: Progressing     Problem: HEMATOLOGIC - ADULT  Goal: Maintains hematologic stability  Description: INTERVENTIONS  - Assess for signs and symptoms of bleeding or hemorrhage  - Monitor labs and vital signs for trends  - Administer supportive blood products/factors, fluids and medications as ordered and appropriate  - Administer supportive blood products/factors as ordered and appropriate  Outcome: Progressing     Problem: Impaired Activities of Daily Living  Goal: Achieve highest/safest level of independence in self care  Description: Interventions:  - Assess ability and encourage patient to participate in ADLs to maximize function  - Promote sitting position while performing ADLs such as feeding, grooming, and bathing  - Educate and encourage patient/family in tolerated functional activity level and precautions during self-care    Outcome: Progressing     Problem: CARDIOVASCULAR - ADULT  Goal: Maintains optimal cardiac output and hemodynamic stability  Description: INTERVENTIONS:  - Monitor vital signs, rhythm, and trends  - Monitor for bleeding, hypotension and signs of decreased cardiac output  - Evaluate effectiveness of vasoactive medications to optimize hemodynamic stability  - Monitor arterial and/or venous puncture sites for bleeding and/or hematoma  - Assess quality of pulses, skin color and temperature  - Assess for signs of decreased coronary artery perfusion - ex.  Angina  - Evaluate fluid balance, assess for edema, trend weights  Outcome: Progressing  Goal: Absence of cardiac arrhythmias or at baseline  Description: INTERVENTIONS:  - Continuous cardiac monitoring, monitor vital signs, obtain 12 lead EKG if indicated  - Evaluate effectiveness of antiarrhythmic and heart rate control medications as ordered  - Initiate emergency measures for life threatening arrhythmias  - Monitor electrolytes and administer replacement therapy as ordered  Outcome: Progressing     Problem: GASTROINTESTINAL - ADULT  Goal: Maintains or returns to baseline bowel function  Description: INTERVENTIONS:  - Assess bowel function  - Maintain adequate hydration with IV or PO as ordered and tolerated  - Evaluate effectiveness of GI medications  - Encourage mobilization and activity  - Obtain nutritional consult as needed  - Establish a toileting routine/schedule  - Consider collaborating with pharmacy to review patient's medication profile  Outcome: Progressing     Problem: METABOLIC/FLUID AND ELECTROLYTES - ADULT  Goal: Hemodynamic stability and optimal renal function maintained  Description: INTERVENTIONS:  - Monitor labs and assess for signs and symptoms of volume excess or deficit  - Monitor intake, output and patient weight  - Monitor urine specific gravity, serum osmolarity and serum sodium as indicated or ordered  - Monitor response to interventions for patient's volume status, including labs, urine output, blood pressure (other measures as available)  - Encourage oral intake as appropriate  - Instruct patient on fluid and nutrition restrictions as appropriate  Outcome: Progressing     Problem: PAIN - ADULT  Goal: Verbalizes/displays adequate comfort level or patient's stated pain goal  Description: INTERVENTIONS:  - Encourage pt to monitor pain and request assistance  - Assess pain using appropriate pain scale  - Administer analgesics based on type and severity of pain and evaluate response  - Implement non-pharmacological measures as appropriate and evaluate response  - Consider cultural and social influences on pain and pain management  - Manage/alleviate anxiety  - Utilize distraction and/or relaxation techniques  - Monitor for opioid side effects  - Notify MD/LIP if interventions unsuccessful or patient reports new pain  - Anticipate increased pain with activity and pre-medicate as appropriate  Outcome: Progressing

## 2023-04-28 VITALS
BODY MASS INDEX: 26.6 KG/M2 | DIASTOLIC BLOOD PRESSURE: 79 MMHG | RESPIRATION RATE: 18 BRPM | OXYGEN SATURATION: 100 % | HEART RATE: 114 BPM | HEIGHT: 64 IN | TEMPERATURE: 99 F | SYSTOLIC BLOOD PRESSURE: 141 MMHG | WEIGHT: 155.81 LBS

## 2023-04-28 LAB
ALBUMIN SERPL-MCNC: 2.2 G/DL (ref 3.4–5)
ANION GAP SERPL CALC-SCNC: 7 MMOL/L (ref 0–18)
BASOPHILS # BLD: 0 X10(3) UL (ref 0–0.2)
BASOPHILS NFR BLD: 0 %
BUN BLD-MCNC: 29 MG/DL (ref 7–18)
BUN/CREAT SERPL: 24.6 (ref 10–20)
CALCIUM BLD-MCNC: 9 MG/DL (ref 8.5–10.1)
CHLORIDE SERPL-SCNC: 111 MMOL/L (ref 98–112)
CO2 SERPL-SCNC: 22 MMOL/L (ref 21–32)
CREAT BLD-MCNC: 1.18 MG/DL
DEPRECATED RDW RBC AUTO: 51.3 FL (ref 35.1–46.3)
EOSINOPHIL # BLD: 0 X10(3) UL (ref 0–0.7)
EOSINOPHIL NFR BLD: 0 %
ERYTHROCYTE [DISTWIDTH] IN BLOOD BY AUTOMATED COUNT: 15.4 % (ref 11–15)
GFR SERPLBLD BASED ON 1.73 SQ M-ARVRAT: 48 ML/MIN/1.73M2 (ref 60–?)
GLUCOSE BLD-MCNC: 87 MG/DL (ref 70–99)
HCT VFR BLD AUTO: 25.1 %
HGB BLD-MCNC: 7.9 G/DL
LYMPHOCYTES NFR BLD: 1.04 X10(3) UL (ref 1–4)
LYMPHOCYTES NFR BLD: 10 %
MAGNESIUM SERPL-MCNC: 2 MG/DL (ref 1.6–2.6)
MCH RBC QN AUTO: 29.5 PG (ref 26–34)
MCHC RBC AUTO-ENTMCNC: 31.5 G/DL (ref 31–37)
MCV RBC AUTO: 93.7 FL
MONOCYTES # BLD: 0.94 X10(3) UL (ref 0.1–1)
MONOCYTES NFR BLD: 9 %
NEUTROPHILS # BLD AUTO: 7.23 X10 (3) UL (ref 1.5–7.7)
NEUTROPHILS NFR BLD: 78 %
NEUTS BAND NFR BLD: 3 %
NEUTS HYPERSEG # BLD: 8.42 X10(3) UL (ref 1.5–7.7)
OSMOLALITY SERPL CALC.SUM OF ELEC: 295 MOSM/KG (ref 275–295)
PHOSPHATE SERPL-MCNC: 3.3 MG/DL (ref 2.5–4.9)
PLATELET # BLD AUTO: 229 10(3)UL (ref 150–450)
PLATELET MORPHOLOGY: NORMAL
POTASSIUM SERPL-SCNC: 4.6 MMOL/L (ref 3.5–5.1)
POTASSIUM SERPL-SCNC: 4.6 MMOL/L (ref 3.5–5.1)
RBC # BLD AUTO: 2.68 X10(6)UL
SODIUM SERPL-SCNC: 140 MMOL/L (ref 136–145)
TOTAL CELLS COUNTED BLD: 100
WBC # BLD AUTO: 10.4 X10(3) UL (ref 4–11)

## 2023-04-28 PROCEDURE — 99232 SBSQ HOSP IP/OBS MODERATE 35: CPT | Performed by: INTERNAL MEDICINE

## 2023-04-28 PROCEDURE — 99239 HOSP IP/OBS DSCHRG MGMT >30: CPT | Performed by: INTERNAL MEDICINE

## 2023-04-28 RX ORDER — CEFDINIR 300 MG/1
300 CAPSULE ORAL 2 TIMES DAILY
Qty: 20 CAPSULE | Refills: 0 | Status: SHIPPED | OUTPATIENT
Start: 2023-04-28 | End: 2023-05-08

## 2023-04-28 RX ORDER — CEFDINIR 300 MG/1
300 CAPSULE ORAL 2 TIMES DAILY
Status: DISCONTINUED | OUTPATIENT
Start: 2023-04-28 | End: 2023-04-28

## 2023-04-28 RX ORDER — PANTOPRAZOLE SODIUM 40 MG/1
40 TABLET, DELAYED RELEASE ORAL
Status: DISCONTINUED | OUTPATIENT
Start: 2023-04-28 | End: 2023-04-28

## 2023-04-28 RX ORDER — PANTOPRAZOLE SODIUM 40 MG/1
40 TABLET, DELAYED RELEASE ORAL
Qty: 120 TABLET | Refills: 0 | Status: SHIPPED | OUTPATIENT
Start: 2023-04-28 | End: 2023-06-27

## 2023-04-28 RX ORDER — VANCOMYCIN HYDROCHLORIDE 125 MG/1
125 CAPSULE ORAL DAILY
Qty: 11 CAPSULE | Refills: 0 | Status: SHIPPED | OUTPATIENT
Start: 2023-04-29 | End: 2023-05-10

## 2023-04-28 RX ORDER — PANTOPRAZOLE SODIUM 40 MG/1
40 TABLET, DELAYED RELEASE ORAL
Status: DISCONTINUED | OUTPATIENT
Start: 2023-04-29 | End: 2023-04-28

## 2023-04-28 NOTE — PLAN OF CARE
No acute changes in the patient's status overnight. Eliquis was resumed. Patient is hopeful for discharge home later today. Problem: Patient Centered Care  Goal: Patient preferences are identified and integrated in the patient's plan of care  Description: Interventions:  - What would you like us to know as we care for you?  I live at home with my   - Provide timely, complete, and accurate information to patient/family  - Incorporate patient and family knowledge, values, beliefs, and cultural backgrounds into the planning and delivery of care  - Encourage patient/family to participate in care and decision-making at the level they choose  - Honor patient and family perspectives and choices  Outcome: Progressing   Problem: HEMATOLOGIC - ADULT  Goal: Maintains hematologic stability  Description: INTERVENTIONS  - Assess for signs and symptoms of bleeding or hemorrhage  - Monitor labs and vital signs for trends  - Administer supportive blood products/factors, fluids and medications as ordered and appropriate  - Administer supportive blood products/factors as ordered and appropriate  Outcome: Progressing     Problem: Impaired Activities of Daily Living  Goal: Achieve highest/safest level of independence in self care  Description: Interventions:  - Assess ability and encourage patient to participate in ADLs to maximize function  - Promote sitting position while performing ADLs such as feeding, grooming, and bathing  - Educate and encourage patient/family in tolerated functional activity level and precautions during self-care  Outcome: Progressing     Problem: CARDIOVASCULAR - ADULT  Goal: Maintains optimal cardiac output and hemodynamic stability  Description: INTERVENTIONS:  - Monitor vital signs, rhythm, and trends  - Monitor for bleeding, hypotension and signs of decreased cardiac output  - Evaluate effectiveness of vasoactive medications to optimize hemodynamic stability  - Monitor arterial and/or venous puncture sites for bleeding and/or hematoma  - Assess quality of pulses, skin color and temperature  - Assess for signs of decreased coronary artery perfusion - ex.  Angina  - Evaluate fluid balance, assess for edema, trend weights  Outcome: Progressing  Goal: Absence of cardiac arrhythmias or at baseline  Description: INTERVENTIONS:  - Continuous cardiac monitoring, monitor vital signs, obtain 12 lead EKG if indicated  - Evaluate effectiveness of antiarrhythmic and heart rate control medications as ordered  - Initiate emergency measures for life threatening arrhythmias  - Monitor electrolytes and administer replacement therapy as ordered  Outcome: Progressing     Problem: GASTROINTESTINAL - ADULT  Goal: Maintains or returns to baseline bowel function  Description: INTERVENTIONS:  - Assess bowel function  - Maintain adequate hydration with IV or PO as ordered and tolerated  - Evaluate effectiveness of GI medications  - Encourage mobilization and activity  - Obtain nutritional consult as needed  - Establish a toileting routine/schedule  - Consider collaborating with pharmacy to review patient's medication profile  Outcome: Progressing     Problem: METABOLIC/FLUID AND ELECTROLYTES - ADULT  Goal: Hemodynamic stability and optimal renal function maintained  Description: INTERVENTIONS:  - Monitor labs and assess for signs and symptoms of volume excess or deficit  - Monitor intake, output and patient weight  - Monitor urine specific gravity, serum osmolarity and serum sodium as indicated or ordered  - Monitor response to interventions for patient's volume status, including labs, urine output, blood pressure (other measures as available)  - Encourage oral intake as appropriate  - Instruct patient on fluid and nutrition restrictions as appropriate  Outcome: Progressing     Problem: PAIN - ADULT  Goal: Verbalizes/displays adequate comfort level or patient's stated pain goal  Description: INTERVENTIONS:  - Encourage pt to monitor pain and request assistance  - Assess pain using appropriate pain scale  - Administer analgesics based on type and severity of pain and evaluate response  - Implement non-pharmacological measures as appropriate and evaluate response  - Consider cultural and social influences on pain and pain management  - Manage/alleviate anxiety  - Utilize distraction and/or relaxation techniques  - Monitor for opioid side effects  - Notify MD/LIP if interventions unsuccessful or patient reports new pain  - Anticipate increased pain with activity and pre-medicate as appropriate  Outcome: Progressing

## 2023-04-28 NOTE — PROGRESS NOTES
Discharge RN Summary: Patient has discharge order in. Patient to discharge home. IVs removed by this RN. Understands to follow up with PCP in 1 week. Patient understands to  prescriptions in ER.        Scripts sent with pt: none

## 2023-05-01 ENCOUNTER — PATIENT OUTREACH (OUTPATIENT)
Dept: CASE MANAGEMENT | Age: 78
End: 2023-05-01

## 2023-05-01 NOTE — PAYOR COMM NOTE
--------------  DISCHARGE REVIEW    Payor: [facility] [place] #:  [id number]  Authorization Number: [id number]    Admit date: 4/22/23  Admit time:   6:30 PM  Discharge Date: 4/28/2023  5:20 PM     Admitting Physician: Sydney Chang MD  Attending Physician:  No att. providers found  Primary Care Physician: Gustavo Record, DO       Discharge Summary Notes    No notes of this type exist for this encounter.          REVIEWER COMMENTS

## 2023-05-02 ENCOUNTER — HOSPITAL ENCOUNTER (INPATIENT)
Facility: HOSPITAL | Age: 78
LOS: 2 days | Discharge: HOME | End: 2023-05-05
Attending: EMERGENCY MEDICINE | Admitting: HOSPITALIST
Payer: MEDICARE

## 2023-05-02 ENCOUNTER — APPOINTMENT (OUTPATIENT)
Dept: GENERAL RADIOLOGY | Facility: HOSPITAL | Age: 78
End: 2023-05-02
Payer: MEDICARE

## 2023-05-02 DIAGNOSIS — R07.9 ACUTE CHEST PAIN: ICD-10-CM

## 2023-05-02 DIAGNOSIS — I48.91 ATRIAL FIBRILLATION WITH RAPID VENTRICULAR RESPONSE (HCC): Primary | ICD-10-CM

## 2023-05-02 DIAGNOSIS — I50.9 ACUTE ON CHRONIC CONGESTIVE HEART FAILURE, UNSPECIFIED HEART FAILURE TYPE (HCC): ICD-10-CM

## 2023-05-02 DIAGNOSIS — J90 PLEURAL EFFUSION: ICD-10-CM

## 2023-05-02 DIAGNOSIS — K92.2 ACUTE GI BLEEDING: ICD-10-CM

## 2023-05-02 LAB
ANION GAP SERPL CALC-SCNC: 11 MMOL/L (ref 0–18)
BASOPHILS # BLD: 0.12 X10(3) UL (ref 0–0.2)
BASOPHILS NFR BLD: 1 %
BUN BLD-MCNC: 11 MG/DL (ref 7–18)
BUN/CREAT SERPL: 8.1 (ref 10–20)
CALCIUM BLD-MCNC: 8.9 MG/DL (ref 8.5–10.1)
CHLORIDE SERPL-SCNC: 113 MMOL/L (ref 98–112)
CO2 SERPL-SCNC: 18 MMOL/L (ref 21–32)
CREAT BLD-MCNC: 1.36 MG/DL
DEPRECATED RDW RBC AUTO: 56.8 FL (ref 35.1–46.3)
EOSINOPHIL # BLD: 0.35 X10(3) UL (ref 0–0.7)
EOSINOPHIL NFR BLD: 3 %
ERYTHROCYTE [DISTWIDTH] IN BLOOD BY AUTOMATED COUNT: 16.3 % (ref 11–15)
GFR SERPLBLD BASED ON 1.73 SQ M-ARVRAT: 40 ML/MIN/1.73M2 (ref 60–?)
GLUCOSE BLD-MCNC: 107 MG/DL (ref 70–99)
HCT VFR BLD AUTO: 25.5 %
HGB BLD-MCNC: 7.7 G/DL
LYMPHOCYTES NFR BLD: 1.15 X10(3) UL (ref 1–4)
LYMPHOCYTES NFR BLD: 10 %
MCH RBC QN AUTO: 29.4 PG (ref 26–34)
MCHC RBC AUTO-ENTMCNC: 30.2 G/DL (ref 31–37)
MCV RBC AUTO: 97.3 FL
METAMYELOCYTES # BLD: 0.12 X10(3) UL
METAMYELOCYTES NFR BLD: 1 %
MONOCYTES # BLD: 1.04 X10(3) UL (ref 0.1–1)
MONOCYTES NFR BLD: 9 %
NEUTROPHILS # BLD AUTO: 7.45 X10 (3) UL (ref 1.5–7.7)
NEUTROPHILS NFR BLD: 74 %
NEUTS BAND NFR BLD: 2 %
NEUTS HYPERSEG # BLD: 8.74 X10(3) UL (ref 1.5–7.7)
NEUTS VAC BLD QL SMEAR: PRESENT
NT-PROBNP SERPL-MCNC: ABNORMAL PG/ML (ref ?–450)
OSMOLALITY SERPL CALC.SUM OF ELEC: 294 MOSM/KG (ref 275–295)
PLATELET # BLD AUTO: 457 10(3)UL (ref 150–450)
POTASSIUM SERPL-SCNC: 4.7 MMOL/L (ref 3.5–5.1)
RBC # BLD AUTO: 2.62 X10(6)UL
SODIUM SERPL-SCNC: 142 MMOL/L (ref 136–145)
TOTAL CELLS COUNTED BLD: 100
TROPONIN I HIGH SENSITIVITY: 33 NG/L
WBC # BLD AUTO: 11.5 X10(3) UL (ref 4–11)

## 2023-05-02 PROCEDURE — 71045 X-RAY EXAM CHEST 1 VIEW: CPT | Performed by: EMERGENCY MEDICINE

## 2023-05-02 PROCEDURE — 99223 1ST HOSP IP/OBS HIGH 75: CPT | Performed by: INTERNAL MEDICINE

## 2023-05-02 RX ORDER — MORPHINE SULFATE 2 MG/ML
2 INJECTION, SOLUTION INTRAMUSCULAR; INTRAVENOUS ONCE
Status: COMPLETED | OUTPATIENT
Start: 2023-05-02 | End: 2023-05-02

## 2023-05-02 RX ORDER — FUROSEMIDE 10 MG/ML
20 INJECTION INTRAMUSCULAR; INTRAVENOUS ONCE
Status: COMPLETED | OUTPATIENT
Start: 2023-05-02 | End: 2023-05-02

## 2023-05-02 RX ORDER — LABETALOL HYDROCHLORIDE 5 MG/ML
20 INJECTION, SOLUTION INTRAVENOUS ONCE
Status: COMPLETED | OUTPATIENT
Start: 2023-05-02 | End: 2023-05-02

## 2023-05-02 NOTE — ED INITIAL ASSESSMENT (HPI)
Arrives via ems from home for complaint of chest tightness and shortness of breath since last night. Denies chest pain on arrival, reporting only palpitations and shortness of breath. Recently admitted for a-fib rvr. Received 324 aspirin pta per ems.

## 2023-05-03 PROBLEM — R07.9 ACUTE CHEST PAIN: Status: ACTIVE | Noted: 2023-05-03

## 2023-05-03 PROBLEM — J90 PLEURAL EFFUSION: Status: ACTIVE | Noted: 2023-05-03

## 2023-05-03 PROBLEM — I50.9 ACUTE ON CHRONIC CONGESTIVE HEART FAILURE, UNSPECIFIED HEART FAILURE TYPE (HCC): Status: ACTIVE | Noted: 2023-05-03

## 2023-05-03 LAB
ANION GAP SERPL CALC-SCNC: 9 MMOL/L (ref 0–18)
ANTIBODY SCREEN: NEGATIVE
ATRIAL RATE: 108 BPM
BUN BLD-MCNC: 12 MG/DL (ref 7–18)
BUN/CREAT SERPL: 9 (ref 10–20)
CALCIUM BLD-MCNC: 8.5 MG/DL (ref 8.5–10.1)
CHLORIDE SERPL-SCNC: 114 MMOL/L (ref 98–112)
CHOLEST SERPL-MCNC: 90 MG/DL (ref ?–200)
CO2 SERPL-SCNC: 19 MMOL/L (ref 21–32)
CREAT BLD-MCNC: 1.34 MG/DL
DEPRECATED RDW RBC AUTO: 54 FL (ref 35.1–46.3)
ERYTHROCYTE [DISTWIDTH] IN BLOOD BY AUTOMATED COUNT: 16.1 % (ref 11–15)
GFR SERPLBLD BASED ON 1.73 SQ M-ARVRAT: 41 ML/MIN/1.73M2 (ref 60–?)
GLUCOSE BLD-MCNC: 115 MG/DL (ref 70–99)
HCT VFR BLD AUTO: 23.1 %
HDLC SERPL-MCNC: 51 MG/DL (ref 40–59)
HGB BLD-MCNC: 7.3 G/DL
INR BLD: 1.44 (ref 0.85–1.16)
IRON SATN MFR SERPL: 13 %
IRON SERPL-MCNC: 35 UG/DL
LDLC SERPL CALC-MCNC: 20 MG/DL (ref ?–100)
MAGNESIUM SERPL-MCNC: 1.8 MG/DL (ref 1.6–2.6)
MCH RBC QN AUTO: 29.7 PG (ref 26–34)
MCHC RBC AUTO-ENTMCNC: 31.6 G/DL (ref 31–37)
MCV RBC AUTO: 93.9 FL
NONHDLC SERPL-MCNC: 39 MG/DL (ref ?–130)
OSMOLALITY SERPL CALC.SUM OF ELEC: 295 MOSM/KG (ref 275–295)
P AXIS: 115 DEGREES
P-R INTERVAL: 208 MS
PLATELET # BLD AUTO: 454 10(3)UL (ref 150–450)
POTASSIUM SERPL-SCNC: 5.1 MMOL/L (ref 3.5–5.1)
PROTHROMBIN TIME: 17.3 SECONDS (ref 11.6–14.8)
Q-T INTERVAL: 354 MS
QRS DURATION: 88 MS
QTC CALCULATION (BEZET): 474 MS
R AXIS: -10 DEGREES
RBC # BLD AUTO: 2.46 X10(6)UL
RH BLOOD TYPE: POSITIVE
SODIUM SERPL-SCNC: 142 MMOL/L (ref 136–145)
T AXIS: 108 DEGREES
TIBC SERPL-MCNC: 261 UG/DL (ref 240–450)
TRANSFERRIN SERPL-MCNC: 175 MG/DL (ref 200–360)
TRIGL SERPL-MCNC: 99 MG/DL (ref 30–149)
TROPONIN I HIGH SENSITIVITY: 31 NG/L
VENTRICULAR RATE: 108 BPM
VLDLC SERPL CALC-MCNC: 13 MG/DL (ref 0–30)
WBC # BLD AUTO: 9.8 X10(3) UL (ref 4–11)

## 2023-05-03 PROCEDURE — 99233 SBSQ HOSP IP/OBS HIGH 50: CPT | Performed by: HOSPITALIST

## 2023-05-03 RX ORDER — PREGABALIN 75 MG/1
75 CAPSULE ORAL 3 TIMES DAILY
Status: DISCONTINUED | OUTPATIENT
Start: 2023-05-03 | End: 2023-05-05

## 2023-05-03 RX ORDER — AMITRIPTYLINE HYDROCHLORIDE 10 MG/1
10 TABLET, FILM COATED ORAL NIGHTLY
Status: DISCONTINUED | OUTPATIENT
Start: 2023-05-03 | End: 2023-05-05

## 2023-05-03 RX ORDER — VANCOMYCIN HYDROCHLORIDE 125 MG/1
125 CAPSULE ORAL DAILY
Status: DISCONTINUED | OUTPATIENT
Start: 2023-05-03 | End: 2023-05-05

## 2023-05-03 RX ORDER — HYDROCODONE BITARTRATE AND ACETAMINOPHEN 10; 325 MG/1; MG/1
1 TABLET ORAL EVERY 6 HOURS PRN
Status: DISCONTINUED | OUTPATIENT
Start: 2023-05-03 | End: 2023-05-05

## 2023-05-03 RX ORDER — CEFDINIR 300 MG/1
300 CAPSULE ORAL 2 TIMES DAILY
Status: DISCONTINUED | OUTPATIENT
Start: 2023-05-03 | End: 2023-05-05

## 2023-05-03 RX ORDER — CARVEDILOL 12.5 MG/1
12.5 TABLET ORAL 2 TIMES DAILY
Status: DISCONTINUED | OUTPATIENT
Start: 2023-05-03 | End: 2023-05-04

## 2023-05-03 RX ORDER — ACETAMINOPHEN 500 MG
500 TABLET ORAL EVERY 4 HOURS PRN
Status: DISCONTINUED | OUTPATIENT
Start: 2023-05-03 | End: 2023-05-05

## 2023-05-03 RX ORDER — DEXTROSE AND SODIUM CHLORIDE 5; .45 G/100ML; G/100ML
INJECTION, SOLUTION INTRAVENOUS CONTINUOUS
Status: DISCONTINUED | OUTPATIENT
Start: 2023-05-03 | End: 2023-05-03

## 2023-05-03 RX ORDER — TORSEMIDE 20 MG/1
20 TABLET ORAL DAILY
Status: DISCONTINUED | OUTPATIENT
Start: 2023-05-03 | End: 2023-05-05

## 2023-05-03 RX ORDER — PANTOPRAZOLE SODIUM 40 MG/1
40 TABLET, DELAYED RELEASE ORAL
Status: DISCONTINUED | OUTPATIENT
Start: 2023-05-03 | End: 2023-05-05

## 2023-05-03 RX ORDER — ALENDRONATE SODIUM 70 MG/1
70 TABLET ORAL WEEKLY
Status: DISCONTINUED | OUTPATIENT
Start: 2023-05-09 | End: 2023-05-05

## 2023-05-03 RX ORDER — MAGNESIUM OXIDE 400 MG/1
400 TABLET ORAL ONCE
Status: COMPLETED | OUTPATIENT
Start: 2023-05-03 | End: 2023-05-03

## 2023-05-03 RX ORDER — LEVETIRACETAM 500 MG/1
500 TABLET ORAL 2 TIMES DAILY
Status: DISCONTINUED | OUTPATIENT
Start: 2023-05-03 | End: 2023-05-05

## 2023-05-03 RX ORDER — MELATONIN
800 DAILY
Status: DISCONTINUED | OUTPATIENT
Start: 2023-05-03 | End: 2023-05-05

## 2023-05-03 NOTE — ED QUICK NOTES
Orders for admission, patient is aware of plan and ready to go upstairs. Any questions, please call ED RN Nathan Locke  at extension [phone]. Patient Covid vaccination status: Fully vaccinated     COVID Test Ordered in ED: None    COVID Suspicion at Admission: N/A    Running Infusions:  None    Mental Status/LOC at time of transport: AOx4    Other pertinent information: Recent admission last week.    CIWA score: N/A   NIH score:  N/A

## 2023-05-03 NOTE — PLAN OF CARE
Pt is alert & oriented x4. Currently on room air. No c/o pain throughout the night. IV fluids infusing. Call light within reach, bed is locked and in lowest position. Problem: Patient Centered Care  Goal: Patient preferences are identified and integrated in the patient's plan of care  Description: Interventions:  - What would you like us to know as we care for you?  I live at home with my    - Provide timely, complete, and accurate information to patient/family  - Incorporate patient and family knowledge, values, beliefs, and cultural backgrounds into the planning and delivery of care  - Encourage patient/family to participate in care and decision-making at the level they choose  - Honor patient and family perspectives and choices  Outcome: Progressing     Problem: Patient/Family Goals  Goal: Patient/Family Long Term Goal  Description: Patient's Long Term Goal: To feel better     Interventions:  -Follow medication regimen, follow treatment plan   - See additional Care Plan goals for specific interventions  Outcome: Progressing  Goal: Patient/Family Short Term Goal  Description: Patient's Short Term Goal: To return home     Interventions:   - Follow medication regimen, follow treatment plan   - See additional Care Plan goals for specific interventions  Outcome: Progressing

## 2023-05-03 NOTE — ED QUICK NOTES
This RN received report from [employer]. Rounding Completed    Plan of Care reviewed. Waiting for XR results. Elimination needs assessed. Patient resting in bed comfortably. Pt on cardiac monitor for VS monitoring. Pt given 20 mg Labetalol (See MAR for details). Will continue to monitor. Bed is locked and in lowest position. Call light within reach.

## 2023-05-03 NOTE — PROGRESS NOTES
Patient was readmitted into [facility] yesterday. Encounter closing.

## 2023-05-03 NOTE — PLAN OF CARE
Patient admitted overnight for chest tightness and shortness of breath. Pt was seen by cardiology this morning, PO torsemide ordered. Eliquis restarted yesterday but placed on hold this morning d/t anemia. IVF discontinued. Pt did work with PT and OT and has been up in the chair most of the day. Plan pending progress/cardiology recs. Problem: Patient Centered Care  Goal: Patient preferences are identified and integrated in the patient's plan of care  Description: Interventions:  - What would you like us to know as we care for you?  I live at home with my   - Provide timely, complete, and accurate information to patient/family  - Incorporate patient and family knowledge, values, beliefs, and cultural backgrounds into the planning and delivery of care  - Encourage patient/family to participate in care and decision-making at the level they choose  - Honor patient and family perspectives and choices  Outcome: Progressing     Problem: Patient/Family Goals  Goal: Patient/Family Long Term Goal  Description: Patient's Long Term Goal: To stay out of the hospital     Interventions:  - Medical management, follow up care  - See additional Care Plan goals for specific interventions  Outcome: Progressing  Goal: Patient/Family Short Term Goal  Description: Patient's Short Term Goal: To feel better    Interventions:   [facility] admission, cardiology consult  - See additional Care Plan goals for specific interventions  Outcome: Progressing     Problem: CARDIOVASCULAR - ADULT  Goal: Maintains optimal cardiac output and hemodynamic stability  Description: INTERVENTIONS:  - Monitor vital signs, rhythm, and trends  - Monitor for bleeding, hypotension and signs of decreased cardiac output  - Evaluate effectiveness of vasoactive medications to optimize hemodynamic stability  - Monitor arterial and/or venous puncture sites for bleeding and/or hematoma  - Assess quality of pulses, skin color and temperature  - Assess for signs of decreased coronary artery perfusion - ex. Angina  - Evaluate fluid balance, assess for edema, trend weights  Outcome: Progressing  Goal: Absence of cardiac arrhythmias or at baseline  Description: INTERVENTIONS:  - Continuous cardiac monitoring, monitor vital signs, obtain 12 lead EKG if indicated  - Evaluate effectiveness of antiarrhythmic and heart rate control medications as ordered  - Initiate emergency measures for life threatening arrhythmias  - Monitor electrolytes and administer replacement therapy as ordered  Outcome: Progressing     Problem: PAIN - ADULT  Goal: Verbalizes/displays adequate comfort level or patient's stated pain goal  Description: INTERVENTIONS:  - Encourage pt to monitor pain and request assistance  - Assess pain using appropriate pain scale  - Administer analgesics based on type and severity of pain and evaluate response  - Implement non-pharmacological measures as appropriate and evaluate response  - Consider cultural and social influences on pain and pain management  - Manage/alleviate anxiety  - Utilize distraction and/or relaxation techniques  - Monitor for opioid side effects  - Notify MD/LIP if interventions unsuccessful or patient reports new pain  - Anticipate increased pain with activity and pre-medicate as appropriate  Outcome: Progressing     Problem: RISK FOR INFECTION - ADULT  Goal: Absence of fever/infection during anticipated neutropenic period  Description: INTERVENTIONS  - Monitor WBC  - Administer growth factors as ordered  - Implement neutropenic guidelines  Outcome: Progressing     Problem: SAFETY ADULT - FALL  Goal: Free from fall injury  Description: INTERVENTIONS:  - Assess pt frequently for physical needs  - Identify cognitive and physical deficits and behaviors that affect risk of falls.   - [place] fall precautions as indicated by assessment.  - Educate pt/family on patient safety including physical limitations  - Instruct pt to call for assistance with activity based on assessment  - Modify environment to reduce risk of injury  - Provide assistive devices as appropriate  - Consider OT/PT consult to assist with strengthening/mobility  - Encourage toileting schedule  Outcome: Progressing     Problem: DISCHARGE PLANNING  Goal: Discharge to home or other facility with appropriate resources  Description: INTERVENTIONS:  - Identify barriers to discharge w/pt and caregiver  - Include patient/family/discharge partner in discharge planning  - Arrange for needed discharge resources and transportation as appropriate  - Identify discharge learning needs (meds, wound care, etc)  - Arrange for interpreters to assist at discharge as needed  - Consider post-discharge preferences of patient/family/discharge partner  - Complete POLST form as appropriate  - Assess patient's ability to be responsible for managing their own health  - Refer to Case Management Department for coordinating discharge planning if the patient needs post-hospital services based on physician/LIP order or complex needs related to functional status, cognitive ability or social support system  Outcome: Progressing

## 2023-05-04 LAB
ANION GAP SERPL CALC-SCNC: 7 MMOL/L (ref 0–18)
BASOPHILS # BLD AUTO: 0.05 X10(3) UL (ref 0–0.2)
BASOPHILS NFR BLD AUTO: 0.7 %
BUN BLD-MCNC: 11 MG/DL (ref 7–18)
BUN/CREAT SERPL: 9.1 (ref 10–20)
CALCIUM BLD-MCNC: 8.5 MG/DL (ref 8.5–10.1)
CHLORIDE SERPL-SCNC: 111 MMOL/L (ref 98–112)
CO2 SERPL-SCNC: 25 MMOL/L (ref 21–32)
CREAT BLD-MCNC: 1.21 MG/DL
DEPRECATED RDW RBC AUTO: 55.5 FL (ref 35.1–46.3)
EOSINOPHIL # BLD AUTO: 0.11 X10(3) UL (ref 0–0.7)
EOSINOPHIL NFR BLD AUTO: 1.6 %
ERYTHROCYTE [DISTWIDTH] IN BLOOD BY AUTOMATED COUNT: 16.2 % (ref 11–15)
GFR SERPLBLD BASED ON 1.73 SQ M-ARVRAT: 46 ML/MIN/1.73M2 (ref 60–?)
GLUCOSE BLD-MCNC: 92 MG/DL (ref 70–99)
HCT VFR BLD AUTO: 23.6 %
HGB BLD-MCNC: 7.3 G/DL
IMM GRANULOCYTES # BLD AUTO: 0.14 X10(3) UL (ref 0–1)
IMM GRANULOCYTES NFR BLD: 2.1 %
INR BLD: 1.56 (ref 0.85–1.16)
LYMPHOCYTES # BLD AUTO: 1.32 X10(3) UL (ref 1–4)
LYMPHOCYTES NFR BLD AUTO: 19.6 %
MAGNESIUM SERPL-MCNC: 1.6 MG/DL (ref 1.6–2.6)
MCH RBC QN AUTO: 29.7 PG (ref 26–34)
MCHC RBC AUTO-ENTMCNC: 30.9 G/DL (ref 31–37)
MCV RBC AUTO: 95.9 FL
MONOCYTES # BLD AUTO: 0.55 X10(3) UL (ref 0.1–1)
MONOCYTES NFR BLD AUTO: 8.2 %
NEUTROPHILS # BLD AUTO: 4.56 X10 (3) UL (ref 1.5–7.7)
NEUTROPHILS # BLD AUTO: 4.56 X10(3) UL (ref 1.5–7.7)
NEUTROPHILS NFR BLD AUTO: 67.8 %
OSMOLALITY SERPL CALC.SUM OF ELEC: 295 MOSM/KG (ref 275–295)
PLATELET # BLD AUTO: 445 10(3)UL (ref 150–450)
POTASSIUM SERPL-SCNC: 3.3 MMOL/L (ref 3.5–5.1)
POTASSIUM SERPL-SCNC: 4.6 MMOL/L (ref 3.5–5.1)
PROTHROMBIN TIME: 18.4 SECONDS (ref 11.6–14.8)
RBC # BLD AUTO: 2.46 X10(6)UL
SODIUM SERPL-SCNC: 143 MMOL/L (ref 136–145)
WBC # BLD AUTO: 6.7 X10(3) UL (ref 4–11)

## 2023-05-04 PROCEDURE — 99233 SBSQ HOSP IP/OBS HIGH 50: CPT | Performed by: HOSPITALIST

## 2023-05-04 RX ORDER — CARVEDILOL 25 MG/1
25 TABLET ORAL 2 TIMES DAILY
Status: DISCONTINUED | OUTPATIENT
Start: 2023-05-04 | End: 2023-05-05

## 2023-05-04 RX ORDER — POTASSIUM CHLORIDE 1.5 G/1.77G
40 POWDER, FOR SOLUTION ORAL EVERY 4 HOURS
Status: COMPLETED | OUTPATIENT
Start: 2023-05-04 | End: 2023-05-04

## 2023-05-04 RX ORDER — MAGNESIUM OXIDE 400 MG/1
400 TABLET ORAL ONCE
Status: COMPLETED | OUTPATIENT
Start: 2023-05-04 | End: 2023-05-04

## 2023-05-04 RX ORDER — CARVEDILOL 12.5 MG/1
12.5 TABLET ORAL ONCE
Status: COMPLETED | OUTPATIENT
Start: 2023-05-04 | End: 2023-05-04

## 2023-05-04 NOTE — PLAN OF CARE
Patient up in the chair for most of the afternoon. Hgb at 7.3 today. Mag and Potassium replaced this morning. IV iron started this afternoon. No complaints. Pt continuing to refuse Keppra. HR in one teens throughout the day. Problem: Patient Centered Care  Goal: Patient preferences are identified and integrated in the patient's plan of care  Description: Interventions:  - What would you like us to know as we care for you? I live at home with my   - Provide timely, complete, and accurate information to patient/family  - Incorporate patient and family knowledge, values, beliefs, and cultural backgrounds into the planning and delivery of care  - Encourage patient/family to participate in care and decision-making at the level they choose  - Honor patient and family perspectives and choices  Outcome: Progressing     Problem: Patient/Family Goals  Goal: Patient/Family Long Term Goal  Description: Patient's Long Term Goal: To stay out of the hospital     Interventions:  - Medical management, follow up care  - See additional Care Plan goals for specific interventions  Outcome: Progressing  Goal: Patient/Family Short Term Goal  Description: Patient's Short Term Goal: To feel better    Interventions:   [facility] admission, cardiology consult  - See additional Care Plan goals for specific interventions  Outcome: Progressing     Problem: CARDIOVASCULAR - ADULT  Goal: Maintains optimal cardiac output and hemodynamic stability  Description: INTERVENTIONS:  - Monitor vital signs, rhythm, and trends  - Monitor for bleeding, hypotension and signs of decreased cardiac output  - Evaluate effectiveness of vasoactive medications to optimize hemodynamic stability  - Monitor arterial and/or venous puncture sites for bleeding and/or hematoma  - Assess quality of pulses, skin color and temperature  - Assess for signs of decreased coronary artery perfusion - ex.  Angina  - Evaluate fluid balance, assess for edema, trend weights  Outcome: Progressing  Goal: Absence of cardiac arrhythmias or at baseline  Description: INTERVENTIONS:  - Continuous cardiac monitoring, monitor vital signs, obtain 12 lead EKG if indicated  - Evaluate effectiveness of antiarrhythmic and heart rate control medications as ordered  - Initiate emergency measures for life threatening arrhythmias  - Monitor electrolytes and administer replacement therapy as ordered  Outcome: Progressing     Problem: PAIN - ADULT  Goal: Verbalizes/displays adequate comfort level or patient's stated pain goal  Description: INTERVENTIONS:  - Encourage pt to monitor pain and request assistance  - Assess pain using appropriate pain scale  - Administer analgesics based on type and severity of pain and evaluate response  - Implement non-pharmacological measures as appropriate and evaluate response  - Consider cultural and social influences on pain and pain management  - Manage/alleviate anxiety  - Utilize distraction and/or relaxation techniques  - Monitor for opioid side effects  - Notify MD/LIP if interventions unsuccessful or patient reports new pain  - Anticipate increased pain with activity and pre-medicate as appropriate  Outcome: Progressing     Problem: RISK FOR INFECTION - ADULT  Goal: Absence of fever/infection during anticipated neutropenic period  Description: INTERVENTIONS  - Monitor WBC  - Administer growth factors as ordered  - Implement neutropenic guidelines  Outcome: Progressing     Problem: SAFETY ADULT - FALL  Goal: Free from fall injury  Description: INTERVENTIONS:  - Assess pt frequently for physical needs  - Identify cognitive and physical deficits and behaviors that affect risk of falls.   - [place] fall precautions as indicated by assessment.  - Educate pt/family on patient safety including physical limitations  - Instruct pt to call for assistance with activity based on assessment  - Modify environment to reduce risk of injury  - Provide assistive devices as appropriate  - Consider OT/PT consult to assist with strengthening/mobility  - Encourage toileting schedule  Outcome: Progressing     Problem: DISCHARGE PLANNING  Goal: Discharge to home or other facility with appropriate resources  Description: INTERVENTIONS:  - Identify barriers to discharge w/pt and caregiver  - Include patient/family/discharge partner in discharge planning  - Arrange for needed discharge resources and transportation as appropriate  - Identify discharge learning needs (meds, wound care, etc)  - Arrange for interpreters to assist at discharge as needed  - Consider post-discharge preferences of patient/family/discharge partner  - Complete POLST form as appropriate  - Assess patient's ability to be responsible for managing their own health  - Refer to Case Management Department for coordinating discharge planning if the patient needs post-hospital services based on physician/LIP order or complex needs related to functional status, cognitive ability or social support system  Outcome: Progressing

## 2023-05-04 NOTE — PLAN OF CARE
Pt is alert & oriented x4. Currently on room air. PRN pain medications administered this evening. PO diuretics. Call light within reach, bed is locked and in lowest position. Problem: Patient Centered Care  Goal: Patient preferences are identified and integrated in the patient's plan of care  Description: Interventions:  - What would you like us to know as we care for you? I live at home with my   - Provide timely, complete, and accurate information to patient/family  - Incorporate patient and family knowledge, values, beliefs, and cultural backgrounds into the planning and delivery of care  - Encourage patient/family to participate in care and decision-making at the level they choose  - Honor patient and family perspectives and choices  Outcome: Progressing     Problem: Patient/Family Goals  Goal: Patient/Family Long Term Goal  Description: Patient's Long Term Goal: To stay out of the hospital     Interventions:  - Medical management, follow up care  - See additional Care Plan goals for specific interventions  Outcome: Progressing  Goal: Patient/Family Short Term Goal  Description: Patient's Short Term Goal: To feel better    Interventions:   [facility] admission, cardiology consult  - See additional Care Plan goals for specific interventions  Outcome: Progressing     Problem: CARDIOVASCULAR - ADULT  Goal: Maintains optimal cardiac output and hemodynamic stability  Description: INTERVENTIONS:  - Monitor vital signs, rhythm, and trends  - Monitor for bleeding, hypotension and signs of decreased cardiac output  - Evaluate effectiveness of vasoactive medications to optimize hemodynamic stability  - Monitor arterial and/or venous puncture sites for bleeding and/or hematoma  - Assess quality of pulses, skin color and temperature  - Assess for signs of decreased coronary artery perfusion - ex.  Angina  - Evaluate fluid balance, assess for edema, trend weights  Outcome: Progressing  Goal: Absence of cardiac arrhythmias or at baseline  Description: INTERVENTIONS:  - Continuous cardiac monitoring, monitor vital signs, obtain 12 lead EKG if indicated  - Evaluate effectiveness of antiarrhythmic and heart rate control medications as ordered  - Initiate emergency measures for life threatening arrhythmias  - Monitor electrolytes and administer replacement therapy as ordered  Outcome: Progressing     Problem: PAIN - ADULT  Goal: Verbalizes/displays adequate comfort level or patient's stated pain goal  Description: INTERVENTIONS:  - Encourage pt to monitor pain and request assistance  - Assess pain using appropriate pain scale  - Administer analgesics based on type and severity of pain and evaluate response  - Implement non-pharmacological measures as appropriate and evaluate response  - Consider cultural and social influences on pain and pain management  - Manage/alleviate anxiety  - Utilize distraction and/or relaxation techniques  - Monitor for opioid side effects  - Notify MD/LIP if interventions unsuccessful or patient reports new pain  - Anticipate increased pain with activity and pre-medicate as appropriate  Outcome: Progressing     Problem: RISK FOR INFECTION - ADULT  Goal: Absence of fever/infection during anticipated neutropenic period  Description: INTERVENTIONS  - Monitor WBC  - Administer growth factors as ordered  - Implement neutropenic guidelines  Outcome: Progressing     Problem: SAFETY ADULT - FALL  Goal: Free from fall injury  Description: INTERVENTIONS:  - Assess pt frequently for physical needs  - Identify cognitive and physical deficits and behaviors that affect risk of falls.   - [place] fall precautions as indicated by assessment.  - Educate pt/family on patient safety including physical limitations  - Instruct pt to call for assistance with activity based on assessment  - Modify environment to reduce risk of injury  - Provide assistive devices as appropriate  - Consider OT/PT consult to assist with strengthening/mobility  - Encourage toileting schedule  Outcome: Progressing     Problem: DISCHARGE PLANNING  Goal: Discharge to home or other facility with appropriate resources  Description: INTERVENTIONS:  - Identify barriers to discharge w/pt and caregiver  - Include patient/family/discharge partner in discharge planning  - Arrange for needed discharge resources and transportation as appropriate  - Identify discharge learning needs (meds, wound care, etc)  - Arrange for interpreters to assist at discharge as needed  - Consider post-discharge preferences of patient/family/discharge partner  - Complete POLST form as appropriate  - Assess patient's ability to be responsible for managing their own health  - Refer to Case Management Department for coordinating discharge planning if the patient needs post-hospital services based on physician/LIP order or complex needs related to functional status, cognitive ability or social support system  Outcome: Progressing

## 2023-05-05 VITALS
WEIGHT: 147.94 LBS | RESPIRATION RATE: 16 BRPM | BODY MASS INDEX: 25 KG/M2 | DIASTOLIC BLOOD PRESSURE: 84 MMHG | HEART RATE: 106 BPM | OXYGEN SATURATION: 98 % | SYSTOLIC BLOOD PRESSURE: 128 MMHG | TEMPERATURE: 98 F

## 2023-05-05 LAB
ANION GAP SERPL CALC-SCNC: 8 MMOL/L (ref 0–18)
BASOPHILS # BLD: 0 X10(3) UL (ref 0–0.2)
BASOPHILS NFR BLD: 0 %
BUN BLD-MCNC: 15 MG/DL (ref 7–18)
BUN/CREAT SERPL: 13 (ref 10–20)
CALCIUM BLD-MCNC: 9 MG/DL (ref 8.5–10.1)
CHLORIDE SERPL-SCNC: 107 MMOL/L (ref 98–112)
CO2 SERPL-SCNC: 27 MMOL/L (ref 21–32)
CREAT BLD-MCNC: 1.15 MG/DL
DEPRECATED RDW RBC AUTO: 54.4 FL (ref 35.1–46.3)
EOSINOPHIL # BLD: 0.13 X10(3) UL (ref 0–0.7)
EOSINOPHIL NFR BLD: 2 %
ERYTHROCYTE [DISTWIDTH] IN BLOOD BY AUTOMATED COUNT: 15.9 % (ref 11–15)
GFR SERPLBLD BASED ON 1.73 SQ M-ARVRAT: 49 ML/MIN/1.73M2 (ref 60–?)
GLUCOSE BLD-MCNC: 87 MG/DL (ref 70–99)
HCT VFR BLD AUTO: 25.4 %
HGB BLD-MCNC: 7.7 G/DL
INR BLD: 1.28 (ref 0.85–1.16)
LYMPHOCYTES NFR BLD: 1.24 X10(3) UL (ref 1–4)
LYMPHOCYTES NFR BLD: 19 %
MAGNESIUM SERPL-MCNC: 1.6 MG/DL (ref 1.6–2.6)
MCH RBC QN AUTO: 29.3 PG (ref 26–34)
MCHC RBC AUTO-ENTMCNC: 30.3 G/DL (ref 31–37)
MCV RBC AUTO: 96.6 FL
MONOCYTES # BLD: 0.39 X10(3) UL (ref 0.1–1)
MONOCYTES NFR BLD: 6 %
NEUTROPHILS # BLD AUTO: 4.23 X10 (3) UL (ref 1.5–7.7)
NEUTROPHILS NFR BLD: 72 %
NEUTS BAND NFR BLD: 1 %
NEUTS HYPERSEG # BLD: 4.75 X10(3) UL (ref 1.5–7.7)
OSMOLALITY SERPL CALC.SUM OF ELEC: 294 MOSM/KG (ref 275–295)
PLATELET # BLD AUTO: 453 10(3)UL (ref 150–450)
PLATELET MORPHOLOGY: NORMAL
POTASSIUM SERPL-SCNC: 4 MMOL/L (ref 3.5–5.1)
PROTHROMBIN TIME: 15.9 SECONDS (ref 11.6–14.8)
RBC # BLD AUTO: 2.63 X10(6)UL
SODIUM SERPL-SCNC: 142 MMOL/L (ref 136–145)
TOTAL CELLS COUNTED BLD: 100
WBC # BLD AUTO: 6.5 X10(3) UL (ref 4–11)

## 2023-05-05 RX ORDER — CARVEDILOL 25 MG/1
25 TABLET ORAL 2 TIMES DAILY
Qty: 60 TABLET | Refills: 0 | Status: SHIPPED | OUTPATIENT
Start: 2023-05-05

## 2023-05-05 RX ORDER — TORSEMIDE 20 MG/1
20 TABLET ORAL DAILY
Qty: 30 TABLET | Refills: 0 | Status: SHIPPED | OUTPATIENT
Start: 2023-05-06 | End: 2023-06-05

## 2023-05-05 RX ORDER — MAGNESIUM OXIDE 400 MG/1
400 TABLET ORAL ONCE
Status: COMPLETED | OUTPATIENT
Start: 2023-05-05 | End: 2023-05-05

## 2023-05-05 NOTE — CM/SW NOTE
05/05/23 1300   Discharge disposition   Expected discharge disposition Home or Self   Discharge transportation Private car     DC orders placed.     Rae Estevez MBA BSN RN [address]  RN Case Manager  [phone]

## 2023-05-05 NOTE — PLAN OF CARE
Patient cleared for discharging home this afternoon. Pt will follow up with the CHF clinic in a week and with Dr. Jenny Schaffer next week to discuss watchman device. Discharge instructions including labs for next week and med changes reviewed with the pt and , all questions answered. Pt discharged home with her  in stable condition. Problem: Patient Centered Care  Goal: Patient preferences are identified and integrated in the patient's plan of care  Description: Interventions:  - What would you like us to know as we care for you?  I live at home with my   - Provide timely, complete, and accurate information to patient/family  - Incorporate patient and family knowledge, values, beliefs, and cultural backgrounds into the planning and delivery of care  - Encourage patient/family to participate in care and decision-making at the level they choose  - Honor patient and family perspectives and choices  Outcome: Completed     Problem: Patient/Family Goals  Goal: Patient/Family Long Term Goal  Description: Patient's Long Term Goal: To stay out of the hospital     Interventions:  - Medical management, follow up care  - See additional Care Plan goals for specific interventions  Outcome: Completed  Goal: Patient/Family Short Term Goal  Description: Patient's Short Term Goal: To feel better    Interventions:   [facility] admission, cardiology consult  - See additional Care Plan goals for specific interventions  Outcome: Completed     Problem: CARDIOVASCULAR - ADULT  Goal: Maintains optimal cardiac output and hemodynamic stability  Description: INTERVENTIONS:  - Monitor vital signs, rhythm, and trends  - Monitor for bleeding, hypotension and signs of decreased cardiac output  - Evaluate effectiveness of vasoactive medications to optimize hemodynamic stability  - Monitor arterial and/or venous puncture sites for bleeding and/or hematoma  - Assess quality of pulses, skin color and temperature  - Assess for signs of decreased coronary artery perfusion - ex. Angina  - Evaluate fluid balance, assess for edema, trend weights  Outcome: Completed  Goal: Absence of cardiac arrhythmias or at baseline  Description: INTERVENTIONS:  - Continuous cardiac monitoring, monitor vital signs, obtain 12 lead EKG if indicated  - Evaluate effectiveness of antiarrhythmic and heart rate control medications as ordered  - Initiate emergency measures for life threatening arrhythmias  - Monitor electrolytes and administer replacement therapy as ordered  Outcome: Completed     Problem: PAIN - ADULT  Goal: Verbalizes/displays adequate comfort level or patient's stated pain goal  Description: INTERVENTIONS:  - Encourage pt to monitor pain and request assistance  - Assess pain using appropriate pain scale  - Administer analgesics based on type and severity of pain and evaluate response  - Implement non-pharmacological measures as appropriate and evaluate response  - Consider cultural and social influences on pain and pain management  - Manage/alleviate anxiety  - Utilize distraction and/or relaxation techniques  - Monitor for opioid side effects  - Notify MD/LIP if interventions unsuccessful or patient reports new pain  - Anticipate increased pain with activity and pre-medicate as appropriate  Outcome: Completed     Problem: RISK FOR INFECTION - ADULT  Goal: Absence of fever/infection during anticipated neutropenic period  Description: INTERVENTIONS  - Monitor WBC  - Administer growth factors as ordered  - Implement neutropenic guidelines  Outcome: Completed     Problem: SAFETY ADULT - FALL  Goal: Free from fall injury  Description: INTERVENTIONS:  - Assess pt frequently for physical needs  - Identify cognitive and physical deficits and behaviors that affect risk of falls.   - [place] fall precautions as indicated by assessment.  - Educate pt/family on patient safety including physical limitations  - Instruct pt to call for assistance with activity based on assessment  - Modify environment to reduce risk of injury  - Provide assistive devices as appropriate  - Consider OT/PT consult to assist with strengthening/mobility  - Encourage toileting schedule  Outcome: Completed     Problem: DISCHARGE PLANNING  Goal: Discharge to home or other facility with appropriate resources  Description: INTERVENTIONS:  - Identify barriers to discharge w/pt and caregiver  - Include patient/family/discharge partner in discharge planning  - Arrange for needed discharge resources and transportation as appropriate  - Identify discharge learning needs (meds, wound care, etc)  - Arrange for interpreters to assist at discharge as needed  - Consider post-discharge preferences of patient/family/discharge partner  - Complete POLST form as appropriate  - Assess patient's ability to be responsible for managing their own health  - Refer to Case Management Department for coordinating discharge planning if the patient needs post-hospital services based on physician/LIP order or complex needs related to functional status, cognitive ability or social support system  Outcome: Completed

## 2023-05-05 NOTE — DISCHARGE INSTRUCTIONS
Follow-up with pcp in 1-2 weeks  Follow-up with cardiology and heart failure clinic in one week  Follow-up with Cardiology and discuss watchman device  Call MD with any concerns    Have CBC checked next week. yes

## 2023-05-05 NOTE — PLAN OF CARE
Pt is alert & oriented x4. Currently on room air. PO diuretics. Safety precautions in place. Problem: Patient Centered Care  Goal: Patient preferences are identified and integrated in the patient's plan of care  Description: Interventions:  - What would you like us to know as we care for you? I live at home with my   - Provide timely, complete, and accurate information to patient/family  - Incorporate patient and family knowledge, values, beliefs, and cultural backgrounds into the planning and delivery of care  - Encourage patient/family to participate in care and decision-making at the level they choose  - Honor patient and family perspectives and choices  Outcome: Progressing     Problem: Patient/Family Goals  Goal: Patient/Family Long Term Goal  Description: Patient's Long Term Goal: To stay out of the hospital     Interventions:  - Medical management, follow up care  - See additional Care Plan goals for specific interventions  Outcome: Progressing  Goal: Patient/Family Short Term Goal  Description: Patient's Short Term Goal: To feel better    Interventions:   [facility] admission, cardiology consult  - See additional Care Plan goals for specific interventions  Outcome: Progressing     Problem: CARDIOVASCULAR - ADULT  Goal: Maintains optimal cardiac output and hemodynamic stability  Description: INTERVENTIONS:  - Monitor vital signs, rhythm, and trends  - Monitor for bleeding, hypotension and signs of decreased cardiac output  - Evaluate effectiveness of vasoactive medications to optimize hemodynamic stability  - Monitor arterial and/or venous puncture sites for bleeding and/or hematoma  - Assess quality of pulses, skin color and temperature  - Assess for signs of decreased coronary artery perfusion - ex.  Angina  - Evaluate fluid balance, assess for edema, trend weights  Outcome: Progressing  Goal: Absence of cardiac arrhythmias or at baseline  Description: INTERVENTIONS:  - Continuous cardiac monitoring, monitor vital signs, obtain 12 lead EKG if indicated  - Evaluate effectiveness of antiarrhythmic and heart rate control medications as ordered  - Initiate emergency measures for life threatening arrhythmias  - Monitor electrolytes and administer replacement therapy as ordered  Outcome: Progressing     Problem: PAIN - ADULT  Goal: Verbalizes/displays adequate comfort level or patient's stated pain goal  Description: INTERVENTIONS:  - Encourage pt to monitor pain and request assistance  - Assess pain using appropriate pain scale  - Administer analgesics based on type and severity of pain and evaluate response  - Implement non-pharmacological measures as appropriate and evaluate response  - Consider cultural and social influences on pain and pain management  - Manage/alleviate anxiety  - Utilize distraction and/or relaxation techniques  - Monitor for opioid side effects  - Notify MD/LIP if interventions unsuccessful or patient reports new pain  - Anticipate increased pain with activity and pre-medicate as appropriate  Outcome: Progressing     Problem: RISK FOR INFECTION - ADULT  Goal: Absence of fever/infection during anticipated neutropenic period  Description: INTERVENTIONS  - Monitor WBC  - Administer growth factors as ordered  - Implement neutropenic guidelines  Outcome: Progressing     Problem: SAFETY ADULT - FALL  Goal: Free from fall injury  Description: INTERVENTIONS:  - Assess pt frequently for physical needs  - Identify cognitive and physical deficits and behaviors that affect risk of falls.   - [place] fall precautions as indicated by assessment.  - Educate pt/family on patient safety including physical limitations  - Instruct pt to call for assistance with activity based on assessment  - Modify environment to reduce risk of injury  - Provide assistive devices as appropriate  - Consider OT/PT consult to assist with strengthening/mobility  - Encourage toileting schedule  Outcome: Progressing Problem: DISCHARGE PLANNING  Goal: Discharge to home or other facility with appropriate resources  Description: INTERVENTIONS:  - Identify barriers to discharge w/pt and caregiver  - Include patient/family/discharge partner in discharge planning  - Arrange for needed discharge resources and transportation as appropriate  - Identify discharge learning needs (meds, wound care, etc)  - Arrange for interpreters to assist at discharge as needed  - Consider post-discharge preferences of patient/family/discharge partner  - Complete POLST form as appropriate  - Assess patient's ability to be responsible for managing their own health  - Refer to Case Management Department for coordinating discharge planning if the patient needs post-hospital services based on physician/LIP order or complex needs related to functional status, cognitive ability or social support system  Outcome: Progressing

## 2023-05-05 NOTE — PLAN OF CARE
Pt A&O x4. Pt denies any SOB. Complained of pain - PRN norco given. Eliquis still on hold. Problem: Patient Centered Care  Goal: Patient preferences are identified and integrated in the patient's plan of care  Description: Interventions:  - What would you like us to know as we care for you? I live at home with my   - Provide timely, complete, and accurate information to patient/family  - Incorporate patient and family knowledge, values, beliefs, and cultural backgrounds into the planning and delivery of care  - Encourage patient/family to participate in care and decision-making at the level they choose  - Honor patient and family perspectives and choices  Outcome: Progressing     Problem: Patient/Family Goals  Goal: Patient/Family Long Term Goal  Description: Patient's Long Term Goal: To stay out of the hospital     Interventions:  - Medical management, follow up care  - See additional Care Plan goals for specific interventions  Outcome: Progressing  Goal: Patient/Family Short Term Goal  Description: Patient's Short Term Goal: To feel better    Interventions:   [facility] admission, cardiology consult  - See additional Care Plan goals for specific interventions  Outcome: Progressing     Problem: CARDIOVASCULAR - ADULT  Goal: Maintains optimal cardiac output and hemodynamic stability  Description: INTERVENTIONS:  - Monitor vital signs, rhythm, and trends  - Monitor for bleeding, hypotension and signs of decreased cardiac output  - Evaluate effectiveness of vasoactive medications to optimize hemodynamic stability  - Monitor arterial and/or venous puncture sites for bleeding and/or hematoma  - Assess quality of pulses, skin color and temperature  - Assess for signs of decreased coronary artery perfusion - ex.  Angina  - Evaluate fluid balance, assess for edema, trend weights  Outcome: Progressing  Goal: Absence of cardiac arrhythmias or at baseline  Description: INTERVENTIONS:  - Continuous cardiac monitoring, monitor vital signs, obtain 12 lead EKG if indicated  - Evaluate effectiveness of antiarrhythmic and heart rate control medications as ordered  - Initiate emergency measures for life threatening arrhythmias  - Monitor electrolytes and administer replacement therapy as ordered  Outcome: Progressing     Problem: PAIN - ADULT  Goal: Verbalizes/displays adequate comfort level or patient's stated pain goal  Description: INTERVENTIONS:  - Encourage pt to monitor pain and request assistance  - Assess pain using appropriate pain scale  - Administer analgesics based on type and severity of pain and evaluate response  - Implement non-pharmacological measures as appropriate and evaluate response  - Consider cultural and social influences on pain and pain management  - Manage/alleviate anxiety  - Utilize distraction and/or relaxation techniques  - Monitor for opioid side effects  - Notify MD/LIP if interventions unsuccessful or patient reports new pain  - Anticipate increased pain with activity and pre-medicate as appropriate  Outcome: Progressing     Problem: RISK FOR INFECTION - ADULT  Goal: Absence of fever/infection during anticipated neutropenic period  Description: INTERVENTIONS  - Monitor WBC  - Administer growth factors as ordered  - Implement neutropenic guidelines  Outcome: Progressing     Problem: SAFETY ADULT - FALL  Goal: Free from fall injury  Description: INTERVENTIONS:  - Assess pt frequently for physical needs  - Identify cognitive and physical deficits and behaviors that affect risk of falls.   - [place] fall precautions as indicated by assessment.  - Educate pt/family on patient safety including physical limitations  - Instruct pt to call for assistance with activity based on assessment  - Modify environment to reduce risk of injury  - Provide assistive devices as appropriate  - Consider OT/PT consult to assist with strengthening/mobility  - Encourage toileting schedule  Outcome: Progressing     Problem: DISCHARGE PLANNING  Goal: Discharge to home or other facility with appropriate resources  Description: INTERVENTIONS:  - Identify barriers to discharge w/pt and caregiver  - Include patient/family/discharge partner in discharge planning  - Arrange for needed discharge resources and transportation as appropriate  - Identify discharge learning needs (meds, wound care, etc)  - Arrange for interpreters to assist at discharge as needed  - Consider post-discharge preferences of patient/family/discharge partner  - Complete POLST form as appropriate  - Assess patient's ability to be responsible for managing their own health  - Refer to Case Management Department for coordinating discharge planning if the patient needs post-hospital services based on physician/LIP order or complex needs related to functional status, cognitive ability or social support system  Outcome: Progressing

## 2023-05-08 ENCOUNTER — PATIENT OUTREACH (OUTPATIENT)
Dept: CASE MANAGEMENT | Age: 78
End: 2023-05-08

## 2023-05-08 DIAGNOSIS — Z02.9 ENCOUNTERS FOR UNSPECIFIED ADMINISTRATIVE PURPOSE: ICD-10-CM

## 2023-05-08 PROCEDURE — 1111F DSCHRG MED/CURRENT MED MERGE: CPT

## 2023-05-08 NOTE — PROGRESS NOTES
Attempted to reach the patient to complete a [facility] FU call. Left a message for the pt to call the [facility] back at, [phone]. The number for the [facility] was also given to the patient to schedule at, [phone].

## 2023-05-09 ENCOUNTER — TELEPHONE (OUTPATIENT)
Dept: FAMILY MEDICINE CLINIC | Facility: CLINIC | Age: 78
End: 2023-05-09

## 2023-05-09 DIAGNOSIS — M54.2 BILATERAL POSTERIOR NECK PAIN: ICD-10-CM

## 2023-05-09 DIAGNOSIS — M05.79 RHEUMATOID ARTHRITIS INVOLVING MULTIPLE SITES WITH POSITIVE RHEUMATOID FACTOR (HCC): ICD-10-CM

## 2023-05-09 DIAGNOSIS — M51.36 LUMBAR DEGENERATIVE DISC DISEASE: ICD-10-CM

## 2023-05-09 NOTE — TELEPHONE ENCOUNTER
Was able to get a hold of patient, called spouse number, scheduled TCM f/u with Shaniqua URBANO 5/12 12:40pm. Patient aware RN may call to go over med list and history before appointment.

## 2023-05-09 NOTE — TELEPHONE ENCOUNTER
Multiple attempts were made to reach the pt for TCM that were unsuccessful. The patient was recently readmitted for a-fib. The pt does not have a HFU appt scheduled at this time. A TCM/HFU appt is recommended by 5/12/2023 as the pt is a high risk for readmission. Please advise. BOOK BY DATE (last date for TCM): 5/19/2023    Please f/u with the pt and assist with scheduling a TCM/HFU appt. Thank you!

## 2023-05-09 NOTE — PROGRESS NOTES
Attempted to reach the patient to complete a [facility] FU call. Left a message for the pt to call the NCM back at, [phone]. The number for the [facility] was also given to the patient to schedule at, [phone].        A TE was sent to the PCP office for an appointment request.

## 2023-05-10 RX ORDER — HYDROCODONE BITARTRATE AND ACETAMINOPHEN 10; 325 MG/1; MG/1
1 TABLET ORAL EVERY 6 HOURS PRN
Qty: 120 TABLET | Refills: 0 | Status: SHIPPED | OUTPATIENT
Start: 2023-05-10

## 2023-05-10 NOTE — TELEPHONE ENCOUNTER
Please review. Protocol failed or has no protocol. Requested Prescriptions   Pending Prescriptions Disp Refills    HYDROcodone-acetaminophen (NORCO)  MG Oral Tab 120 tablet 0     Sig: Take 1 tablet by mouth every 6 (six) hours as needed for Pain.        There is no refill protocol information for this order          Recent Outpatient Visits              3 weeks ago Anterolisthesis of cervical spine    [address] [place], [place]    Office Visit    1 month ago Cervical radiculopathy    [facility] Elsa Ferreira, DO    Office Visit    1 month ago Cervical radiculopathy    [facility], [address], [place], [place]    Office Visit    2 months ago Arthropathy of cervical facet joint    [facility] Elsa Ferreira, DO    Office Visit    2 months ago Arthropathy of cervical facet joint    [address] [facility],[address], [address] [facility],[address], [facility],     Office Visit            Future Appointments         Provider Department Appt Notes    In 2 days Candance Lather, [address], [place] [address], Juliana merlos     In 3 months Emely Ruth, [facility], [address]

## 2023-05-11 ENCOUNTER — MED REC SCAN ONLY (OUTPATIENT)
Dept: PHYSICAL MEDICINE AND REHAB | Facility: CLINIC | Age: 78
End: 2023-05-11

## 2023-05-12 ENCOUNTER — OFFICE VISIT (OUTPATIENT)
Dept: FAMILY MEDICINE CLINIC | Facility: CLINIC | Age: 78
End: 2023-05-12

## 2023-05-12 VITALS
BODY MASS INDEX: 23.9 KG/M2 | SYSTOLIC BLOOD PRESSURE: 94 MMHG | WEIGHT: 140 LBS | TEMPERATURE: 97 F | HEIGHT: 64 IN | HEART RATE: 72 BPM | DIASTOLIC BLOOD PRESSURE: 64 MMHG

## 2023-05-12 DIAGNOSIS — I10 ESSENTIAL (PRIMARY) HYPERTENSION: ICD-10-CM

## 2023-05-12 DIAGNOSIS — Z87.19 HISTORY OF GI BLEED: ICD-10-CM

## 2023-05-12 DIAGNOSIS — Z09 FOLLOW-UP EXAM: ICD-10-CM

## 2023-05-12 DIAGNOSIS — R35.0 INCREASED FREQUENCY OF URINATION: ICD-10-CM

## 2023-05-12 DIAGNOSIS — R53.83 OTHER FATIGUE: ICD-10-CM

## 2023-05-12 DIAGNOSIS — D64.9 ANEMIA, UNSPECIFIED TYPE: ICD-10-CM

## 2023-05-12 DIAGNOSIS — I48.91 ATRIAL FIBRILLATION WITH RVR (HCC): Primary | ICD-10-CM

## 2023-05-12 DIAGNOSIS — R79.9 ABNORMAL BLOOD CHEMISTRY: ICD-10-CM

## 2023-05-12 PROBLEM — I25.10 CORONARY ARTERIOSCLEROSIS: Status: ACTIVE | Noted: 2017-08-30

## 2023-05-12 PROBLEM — I05.0 MITRAL VALVE STENOSIS: Status: ACTIVE | Noted: 2017-08-30

## 2023-05-12 PROBLEM — I50.20 ACC/AHA STAGE B SYSTOLIC HEART FAILURE (HCC): Status: ACTIVE | Noted: 2022-07-27

## 2023-05-12 PROBLEM — M06.9 RHEUMATOID ARTHRITIS (HCC): Status: ACTIVE | Noted: 2017-08-30

## 2023-05-12 PROBLEM — I50.9 CONGESTIVE HEART FAILURE (HCC): Status: ACTIVE | Noted: 2018-01-26

## 2023-05-12 RX ORDER — SACUBITRIL AND VALSARTAN 24; 26 MG/1; MG/1
1 TABLET, FILM COATED ORAL 2 TIMES DAILY
COMMUNITY
Start: 2023-02-16

## 2023-05-16 ENCOUNTER — TELEPHONE (OUTPATIENT)
Dept: CARDIOLOGY CLINIC | Facility: HOSPITAL | Age: 78
End: 2023-05-16

## 2023-05-31 RX ORDER — AMITRIPTYLINE HYDROCHLORIDE 10 MG/1
TABLET, FILM COATED ORAL
Qty: 30 TABLET | Refills: 0 | Status: SHIPPED | OUTPATIENT
Start: 2023-05-31

## 2023-05-31 NOTE — TELEPHONE ENCOUNTER
Refill Request    Medication request: amitriptyline 10 MG Oral Tab  Take 1 tablet (10 mg total) by mouth nightly. Marilu Bennett,    Due back to clinic per last office note:  n/a  NOV: Visit date not found      [place] 03/27/23 #30    Urine drug screen (if applicable): n/a  Pain contract: n/a    LOV plan (if weaning or changing medications): She will continue her current pain medications as tolerated.

## 2023-06-01 DIAGNOSIS — M05.79 RHEUMATOID ARTHRITIS INVOLVING MULTIPLE SITES WITH POSITIVE RHEUMATOID FACTOR (HCC): ICD-10-CM

## 2023-06-01 DIAGNOSIS — M54.2 BILATERAL POSTERIOR NECK PAIN: ICD-10-CM

## 2023-06-01 DIAGNOSIS — M51.36 LUMBAR DEGENERATIVE DISC DISEASE: ICD-10-CM

## 2023-06-01 NOTE — TELEPHONE ENCOUNTER
Please review. Protocol failed / Has no protocol. Requested Prescriptions   Pending Prescriptions Disp Refills    HYDROcodone-acetaminophen (NORCO)  MG Oral Tab 120 tablet 0     Sig: Take 1 tablet by mouth every 6 (six) hours as needed for Pain.        There is no refill protocol information for this order        Future Appointments         Provider Department Appt Notes    In 5 days [address] [place] [address], NP [address] by RN    In 2 months [address], [facility] 86, [facility] 26,           Recent Outpatient Visits              2 weeks ago Atrial fibrillation with [facility])    [address], [facility] 86, [facility] Adalberto Moya, APRN    Office Visit    1 month ago Anterolisthesis of cervical spine    [address] [place], [place]    Office Visit    1 month ago Cervical radiculopathy    [facility] Mirza Hunt,     Office Visit    2 months ago Cervical radiculopathy    [address], [place], [place]    Office Visit    2 months ago Arthropathy of cervical facet joint    [facility] Mirza Hunt, DO    Office Visit

## 2023-06-02 RX ORDER — HYDROCODONE BITARTRATE AND ACETAMINOPHEN 10; 325 MG/1; MG/1
1 TABLET ORAL EVERY 6 HOURS PRN
Qty: 120 TABLET | Refills: 0 | Status: SHIPPED | OUTPATIENT
Start: 2023-06-02

## 2023-06-05 DIAGNOSIS — I50.9 ACUTE ON CHRONIC CONGESTIVE HEART FAILURE, UNSPECIFIED HEART FAILURE TYPE (HCC): Primary | ICD-10-CM

## 2023-06-06 ENCOUNTER — OFFICE VISIT (OUTPATIENT)
Dept: CARDIOLOGY CLINIC | Facility: HOSPITAL | Age: 78
End: 2023-06-06
Attending: NURSE PRACTITIONER
Payer: MEDICARE

## 2023-06-06 VITALS
BODY MASS INDEX: 24 KG/M2 | SYSTOLIC BLOOD PRESSURE: 111 MMHG | OXYGEN SATURATION: 100 % | WEIGHT: 141.13 LBS | HEART RATE: 80 BPM | DIASTOLIC BLOOD PRESSURE: 68 MMHG

## 2023-06-06 DIAGNOSIS — N17.9 AKI (ACUTE KIDNEY INJURY) (HCC): ICD-10-CM

## 2023-06-06 DIAGNOSIS — N18.31 STAGE 3A CHRONIC KIDNEY DISEASE (HCC): ICD-10-CM

## 2023-06-06 DIAGNOSIS — I50.23 ACUTE ON CHRONIC HFREF (HEART FAILURE WITH REDUCED EJECTION FRACTION) (HCC): Primary | Chronic | ICD-10-CM

## 2023-06-06 DIAGNOSIS — I50.9 ACUTE ON CHRONIC CONGESTIVE HEART FAILURE, UNSPECIFIED HEART FAILURE TYPE (HCC): ICD-10-CM

## 2023-06-06 DIAGNOSIS — I48.92 ATRIAL FLUTTER, UNSPECIFIED TYPE (HCC): Chronic | ICD-10-CM

## 2023-06-06 DIAGNOSIS — I48.91 ATRIAL FIBRILLATION WITH RAPID VENTRICULAR RESPONSE (HCC): ICD-10-CM

## 2023-06-06 LAB
ALBUMIN SERPL-MCNC: 3.9 G/DL (ref 3.4–5)
ANION GAP SERPL CALC-SCNC: 8 MMOL/L (ref 0–18)
ANION GAP SERPL CALC-SCNC: 8 MMOL/L (ref 0–18)
BASOPHILS # BLD AUTO: 0.05 X10(3) UL (ref 0–0.2)
BASOPHILS NFR BLD AUTO: 1 %
BUN BLD-MCNC: 31 MG/DL (ref 7–18)
BUN BLD-MCNC: 31 MG/DL (ref 7–18)
BUN/CREAT SERPL: 17.1 (ref 10–20)
BUN/CREAT SERPL: 17.1 (ref 10–20)
CALCIUM BLD-MCNC: 9.5 MG/DL (ref 8.5–10.1)
CALCIUM BLD-MCNC: 9.5 MG/DL (ref 8.5–10.1)
CHLORIDE SERPL-SCNC: 103 MMOL/L (ref 98–112)
CHLORIDE SERPL-SCNC: 103 MMOL/L (ref 98–112)
CO2 SERPL-SCNC: 25 MMOL/L (ref 21–32)
CO2 SERPL-SCNC: 25 MMOL/L (ref 21–32)
CREAT BLD-MCNC: 1.81 MG/DL
CREAT BLD-MCNC: 1.81 MG/DL
DEPRECATED RDW RBC AUTO: 57.1 FL (ref 35.1–46.3)
EOSINOPHIL # BLD AUTO: 0.12 X10(3) UL (ref 0–0.7)
EOSINOPHIL NFR BLD AUTO: 2.3 %
ERYTHROCYTE [DISTWIDTH] IN BLOOD BY AUTOMATED COUNT: 16.2 % (ref 11–15)
FASTING STATUS PATIENT QL REPORTED: NO
GFR SERPLBLD BASED ON 1.73 SQ M-ARVRAT: 28 ML/MIN/1.73M2 (ref 60–?)
GFR SERPLBLD BASED ON 1.73 SQ M-ARVRAT: 28 ML/MIN/1.73M2 (ref 60–?)
GLUCOSE BLD-MCNC: 95 MG/DL (ref 70–99)
GLUCOSE BLD-MCNC: 95 MG/DL (ref 70–99)
HCT VFR BLD AUTO: 30.1 %
HGB BLD-MCNC: 9.1 G/DL
IMM GRANULOCYTES # BLD AUTO: 0.03 X10(3) UL (ref 0–1)
IMM GRANULOCYTES NFR BLD: 0.6 %
IRON SATN MFR SERPL: 18 %
IRON SERPL-MCNC: 60 UG/DL
LYMPHOCYTES # BLD AUTO: 1.99 X10(3) UL (ref 1–4)
LYMPHOCYTES NFR BLD AUTO: 38.4 %
MCH RBC QN AUTO: 29.6 PG (ref 26–34)
MCHC RBC AUTO-ENTMCNC: 30.2 G/DL (ref 31–37)
MCV RBC AUTO: 98 FL
MONOCYTES # BLD AUTO: 0.6 X10(3) UL (ref 0.1–1)
MONOCYTES NFR BLD AUTO: 11.6 %
NEUTROPHILS # BLD AUTO: 2.39 X10 (3) UL (ref 1.5–7.7)
NEUTROPHILS # BLD AUTO: 2.39 X10(3) UL (ref 1.5–7.7)
NEUTROPHILS NFR BLD AUTO: 46.1 %
NT-PROBNP SERPL-MCNC: 2167 PG/ML (ref ?–450)
OSMOLALITY SERPL CALC.SUM OF ELEC: 288 MOSM/KG (ref 275–295)
OSMOLALITY SERPL CALC.SUM OF ELEC: 288 MOSM/KG (ref 275–295)
PHOSPHATE SERPL-MCNC: 3.9 MG/DL (ref 2.5–4.9)
PLATELET # BLD AUTO: 206 10(3)UL (ref 150–450)
POTASSIUM SERPL-SCNC: 3.6 MMOL/L (ref 3.5–5.1)
POTASSIUM SERPL-SCNC: 3.6 MMOL/L (ref 3.5–5.1)
RBC # BLD AUTO: 3.07 X10(6)UL
SODIUM SERPL-SCNC: 136 MMOL/L (ref 136–145)
SODIUM SERPL-SCNC: 136 MMOL/L (ref 136–145)
TIBC SERPL-MCNC: 328 UG/DL (ref 240–450)
TRANSFERRIN SERPL-MCNC: 220 MG/DL (ref 200–360)
WBC # BLD AUTO: 5.2 X10(3) UL (ref 4–11)

## 2023-06-06 PROCEDURE — 83540 ASSAY OF IRON: CPT

## 2023-06-06 PROCEDURE — 36415 COLL VENOUS BLD VENIPUNCTURE: CPT

## 2023-06-06 PROCEDURE — 99205 OFFICE O/P NEW HI 60 MIN: CPT | Performed by: NURSE PRACTITIONER

## 2023-06-06 PROCEDURE — 85025 COMPLETE CBC W/AUTO DIFF WBC: CPT

## 2023-06-06 PROCEDURE — 84466 ASSAY OF TRANSFERRIN: CPT

## 2023-06-06 RX ORDER — FUROSEMIDE 20 MG/1
20 TABLET ORAL DAILY
Qty: 30 TABLET | Refills: 0 | Status: SHIPPED | OUTPATIENT
Start: 2023-06-08

## 2023-06-06 RX ORDER — MELATONIN
325
COMMUNITY

## 2023-06-06 NOTE — PROGRESS NOTES
Subjective: Denies lightheadedness, or dizziness, chest pain or palpitations, swelling or shortness of breath. She reports being in pain a lot, arthritis, pinched nerve in neck. Had cortisone and epidural. She also reports poor appetite secondary to pain and notes her pre hospital weight was normally 154. Weight: Today 141.1  Home none  Discharge wt: 147 lb 5/5/23  Labs completed: by RN BMP/TIBC/IRON/CBC    Last dose of torsemide 20 mg daily was 6/5/23. Was taking furosemide prior. IV placed:  no  Measurements :  RLE 13.5\"   LLE 13.5\"  Patient and medication assessed. Discussed with APN. Extensive education on disease management including weighing daily, fluid restriction. Reviewed AVS instructions. Patient verbalized understanding.

## 2023-06-06 NOTE — PATIENT INSTRUCTIONS
Discontinue torsemide    Resume furosemide 20 mg one tab daily starting Thursday 6/8/23      Continue entresto at 24/26 mg tabs twice daily     Continue all your medications as prescribed     Call if having shortness of breath, cough, wheezing, chest pain, dizziness, lightheadedness, heart racing, palpitations, swelling, weight gain, fatigue or weakness    Call 911 with severe shortness of breath, chest pain or chest pressure not improved after 15 minutes of rest or if feeling faint,  passing out or having a fall     Weigh yourself daily in the morning before breakfast, call if gaining 3 lbs or more overnight or more than 5 lbs in one week. Follow 2000 mg sodium restricted , heart healthy diet, Keep daily fluids at 48-64 ounces per day    Follow up with Dr. Jc Chapman in 2 weeks, call to make an appointment at [phone]    Follow up with the specialty care clinic in 1 week        Limit sodium to  2000 mg daily. Common high sodium foods include frozen dinners, soups (not homemade), some cereal, vegetable juice, canned vegetables, lunch meats, processed meats like hotdogs, sausage, vila, pepperoni, soy sauce, pre-packaged rice or potatoes. Please remember to read nutrition labels for sodium content.     www.healthyeating. nhlbi.nih.gov      Exercise daily as tolerated, with goal of doing moderate aerobic exercise like walking for about 30 minutes 5 days per week. Start by walking at a slow to moderate pace for 5-10 minutes 2-3 times a day. Pace your activity to prevent shortness of breath or fatigue.  Stop exercise if you develop chest pain, lightheadedness, or significant shortness of breath

## 2023-06-07 DIAGNOSIS — R94.4 DECREASED GFR: ICD-10-CM

## 2023-06-07 DIAGNOSIS — N28.9 POOR KIDNEY FUNCTION: Primary | ICD-10-CM

## 2023-06-14 ENCOUNTER — TELEPHONE (OUTPATIENT)
Dept: CARDIOLOGY CLINIC | Facility: HOSPITAL | Age: 78
End: 2023-06-14

## 2023-06-14 DIAGNOSIS — I50.9 ACUTE ON CHRONIC CONGESTIVE HEART FAILURE, UNSPECIFIED HEART FAILURE TYPE (HCC): Primary | ICD-10-CM

## 2023-06-14 NOTE — TELEPHONE ENCOUNTER
Appointment reminder left with request to come 30 minutes early for lab draw, if running late, RN would draw. Lab order entered and scheduled.

## 2023-06-15 ENCOUNTER — OFFICE VISIT (OUTPATIENT)
Dept: CARDIOLOGY CLINIC | Facility: HOSPITAL | Age: 78
End: 2023-06-15
Attending: NURSE PRACTITIONER
Payer: MEDICARE

## 2023-06-15 ENCOUNTER — LAB ENCOUNTER (OUTPATIENT)
Dept: LAB | Facility: HOSPITAL | Age: 78
End: 2023-06-15
Attending: NURSE PRACTITIONER
Payer: MEDICARE

## 2023-06-15 VITALS
WEIGHT: 141 LBS | DIASTOLIC BLOOD PRESSURE: 80 MMHG | OXYGEN SATURATION: 100 % | BODY MASS INDEX: 24 KG/M2 | SYSTOLIC BLOOD PRESSURE: 140 MMHG | HEART RATE: 80 BPM

## 2023-06-15 DIAGNOSIS — I50.23 ACUTE ON CHRONIC HFREF (HEART FAILURE WITH REDUCED EJECTION FRACTION) (HCC): Chronic | ICD-10-CM

## 2023-06-15 DIAGNOSIS — I05.0 MITRAL VALVE STENOSIS, UNSPECIFIED ETIOLOGY: ICD-10-CM

## 2023-06-15 DIAGNOSIS — I10 ESSENTIAL (PRIMARY) HYPERTENSION: ICD-10-CM

## 2023-06-15 DIAGNOSIS — N18.31 STAGE 3A CHRONIC KIDNEY DISEASE (HCC): ICD-10-CM

## 2023-06-15 DIAGNOSIS — I48.92 ATRIAL FLUTTER, UNSPECIFIED TYPE (HCC): Chronic | ICD-10-CM

## 2023-06-15 DIAGNOSIS — I50.9 ACUTE ON CHRONIC CONGESTIVE HEART FAILURE, UNSPECIFIED HEART FAILURE TYPE (HCC): ICD-10-CM

## 2023-06-15 DIAGNOSIS — I50.9 ACUTE ON CHRONIC CONGESTIVE HEART FAILURE, UNSPECIFIED HEART FAILURE TYPE (HCC): Primary | ICD-10-CM

## 2023-06-15 LAB
ANION GAP SERPL CALC-SCNC: 9 MMOL/L (ref 0–18)
BUN BLD-MCNC: 19 MG/DL (ref 7–18)
BUN/CREAT SERPL: 17.6 (ref 10–20)
CALCIUM BLD-MCNC: 9 MG/DL (ref 8.5–10.1)
CHLORIDE SERPL-SCNC: 107 MMOL/L (ref 98–112)
CO2 SERPL-SCNC: 25 MMOL/L (ref 21–32)
CREAT BLD-MCNC: 1.08 MG/DL
FASTING STATUS PATIENT QL REPORTED: NO
GFR SERPLBLD BASED ON 1.73 SQ M-ARVRAT: 53 ML/MIN/1.73M2 (ref 60–?)
GLUCOSE BLD-MCNC: 96 MG/DL (ref 70–99)
OSMOLALITY SERPL CALC.SUM OF ELEC: 294 MOSM/KG (ref 275–295)
POTASSIUM SERPL-SCNC: 4.1 MMOL/L (ref 3.5–5.1)
SODIUM SERPL-SCNC: 141 MMOL/L (ref 136–145)

## 2023-06-15 PROCEDURE — 99215 OFFICE O/P EST HI 40 MIN: CPT | Performed by: NURSE PRACTITIONER

## 2023-06-15 PROCEDURE — 36415 COLL VENOUS BLD VENIPUNCTURE: CPT

## 2023-06-15 PROCEDURE — 80048 BASIC METABOLIC PNL TOTAL CA: CPT

## 2023-06-15 RX ORDER — FUROSEMIDE 20 MG/1
10 TABLET ORAL DAILY
Qty: 45 TABLET | Refills: 1 | Status: SHIPPED | OUTPATIENT
Start: 2023-06-15

## 2023-06-15 NOTE — PATIENT INSTRUCTIONS
Start farxiga 10 mg daily     Decrease furosemide to 10 mg daily ( half of 20 mg tab)    Continue other medications as prescribed       Call if having shortness of breath, cough, wheezing, chest pain, dizziness, lightheadedness, heart racing, palpitations, swelling, weight gain, fatigue or weakness    Call 911 with severe shortness of breath, chest pain or chest pressure not improved after 15 minutes of rest or if feeling faint,  passing out or having a fall     Weigh yourself daily in the morning before breakfast, call if gaining 3 lbs or more overnight or more than 5 lbs in one week. Follow 2000 mg sodium restricted , heart healthy diet, Keep daily fluids at 48-64 ounces per day    Follow up with Dr. Jamal Lee tomorrow as scheduled    Follow up with Dr. Severiano Henderson June 30th as scheduled    Follow up with Dr. Rajesh Mccauley August 21st as scheduled    Follow up with the specialty care clinic in 3 weeks    Basic metabolic panel in 2 weeks, right before seeing Dr. Latisha Ramires every day, paying special attention to washing your private area. Watch for signs of a urinary tract infection or skin infection including difficulty urinating, pain or burning with urination, pink tinged or blood in urine, fatigue, weakness, confusion. Watch for redness or irritation on skin on or around the vaginal area. Limit sodium to  2000 mg daily. Common high sodium foods include frozen dinners, soups (not homemade), some cereal, vegetable juice, canned vegetables, lunch meats, processed meats like hotdogs, sausage, vila, pepperoni, soy sauce, pre-packaged rice or potatoes. Please remember to read nutrition labels for sodium content.     www.healthyeating. nhlbi.nih.gov      Exercise daily as tolerated, with goal of doing moderate aerobic exercise like walking for about 30 minutes 5 days per week. Start by walking at a slow to moderate pace for 5-10 minutes 2-3 times a day. Pace your activity to prevent shortness of breath or fatigue.  Stop exercise if you develop chest pain, lightheadedness, or significant shortness of breath

## 2023-06-15 NOTE — PROGRESS NOTES
Margaret ambulates indecently. She reports seeing Dr. Betzy Jackson earlier this week, reports no changes in her meds. She reports home weight no higher than 141 lb. Reports eating \"good\" last night. Subjective: Denies lightheadedness, or dizziness, chest pain or palpitations, swelling or shortness of breath. Takes methotrexate on Tuesdays  Weight:  Today 146.6 lb   Home NONE Last visit 141 lb  Labs completed : at lab BMP  Device:  CardioMEMS (NONE)  IV placed:  NO  Measurements :  RLE 13.5\"    LLE 12.75\"    Patient and medication assessed. Discussed with APN. Disease management teaching completed. Reviewed AVS instructions. Patient verbalized understanding.

## 2023-06-29 ENCOUNTER — MED REC SCAN ONLY (OUTPATIENT)
Facility: CLINIC | Age: 78
End: 2023-06-29

## 2023-06-29 RX ORDER — AMITRIPTYLINE HYDROCHLORIDE 10 MG/1
10 TABLET, FILM COATED ORAL NIGHTLY
Qty: 30 TABLET | Refills: 0 | Status: SHIPPED | OUTPATIENT
Start: 2023-06-29

## 2023-07-03 DIAGNOSIS — M51.36 LUMBAR DEGENERATIVE DISC DISEASE: ICD-10-CM

## 2023-07-03 DIAGNOSIS — M05.79 RHEUMATOID ARTHRITIS INVOLVING MULTIPLE SITES WITH POSITIVE RHEUMATOID FACTOR (HCC): ICD-10-CM

## 2023-07-03 DIAGNOSIS — M54.2 BILATERAL POSTERIOR NECK PAIN: ICD-10-CM

## 2023-07-03 RX ORDER — HYDROCODONE BITARTRATE AND ACETAMINOPHEN 10; 325 MG/1; MG/1
1 TABLET ORAL EVERY 6 HOURS PRN
Qty: 120 TABLET | Refills: 0 | Status: SHIPPED | OUTPATIENT
Start: 2023-07-03

## 2023-07-05 ENCOUNTER — TELEPHONE (OUTPATIENT)
Dept: CARDIOLOGY CLINIC | Facility: HOSPITAL | Age: 78
End: 2023-07-05

## 2023-07-05 DIAGNOSIS — I50.9 ACUTE ON CHRONIC CONGESTIVE HEART FAILURE, UNSPECIFIED HEART FAILURE TYPE (HCC): Primary | ICD-10-CM

## 2023-07-05 NOTE — TELEPHONE ENCOUNTER
Appointment reminder message left with request to come 30 minutes early for lab draw. Lab order entered and scheduled.

## 2023-07-17 ENCOUNTER — OFFICE VISIT (OUTPATIENT)
Dept: PHYSICAL MEDICINE AND REHAB | Facility: CLINIC | Age: 78
End: 2023-07-17
Payer: COMMERCIAL

## 2023-07-17 VITALS
SYSTOLIC BLOOD PRESSURE: 128 MMHG | BODY MASS INDEX: 24.07 KG/M2 | DIASTOLIC BLOOD PRESSURE: 72 MMHG | WEIGHT: 141 LBS | HEIGHT: 64 IN

## 2023-07-17 DIAGNOSIS — M54.12 CERVICAL RADICULOPATHY: Primary | ICD-10-CM

## 2023-07-17 PROCEDURE — 1160F RVW MEDS BY RX/DR IN RCRD: CPT | Performed by: PHYSICAL MEDICINE & REHABILITATION

## 2023-07-17 PROCEDURE — 1159F MED LIST DOCD IN RCRD: CPT | Performed by: PHYSICAL MEDICINE & REHABILITATION

## 2023-07-17 PROCEDURE — 3078F DIAST BP <80 MM HG: CPT | Performed by: PHYSICAL MEDICINE & REHABILITATION

## 2023-07-17 PROCEDURE — 3008F BODY MASS INDEX DOCD: CPT | Performed by: PHYSICAL MEDICINE & REHABILITATION

## 2023-07-17 PROCEDURE — 3074F SYST BP LT 130 MM HG: CPT | Performed by: PHYSICAL MEDICINE & REHABILITATION

## 2023-07-17 PROCEDURE — 99214 OFFICE O/P EST MOD 30 MIN: CPT | Performed by: PHYSICAL MEDICINE & REHABILITATION

## 2023-07-17 NOTE — PATIENT INSTRUCTIONS
-Start Lyrica three times daily  -Continue [place] and Tylenol ES for pain  -My office will call once injection is approved

## 2023-07-19 ENCOUNTER — TELEPHONE (OUTPATIENT)
Dept: PHYSICAL MEDICINE AND REHAB | Facility: CLINIC | Age: 78
End: 2023-07-19

## 2023-07-19 NOTE — TELEPHONE ENCOUNTER
Initiated authorization for C7-T1 Interlaminar Epidural Steroid Injection under fluoroscopy guidance CPT 12238 dx:M54.12 to be done at [address]  with [facility]  Status: Notification or Prior Authorization is not required for the requested services valid 7/19/23-10/17/23    This [employer] Medicare Advantage members plan does not currently require a prior authorization for these services. If you have general questions about the prior authorization requirements, please call us at [phone] or visit [employer] > Clinician Resources > Advance and Admission Notification Requirements. The number above acknowledges your notification. Please write this number down for future reference. Notification is not a guarantee of coverage or payment.     Decision ID #:[id number]

## 2023-07-19 NOTE — TELEPHONE ENCOUNTER
Status of Anticoag  [x] Clearance pending to hold Eliquis 2.5mg for 3 days prior to C7-T1 Interlaminar epidural steroid injection to Dr. Dale Malik F: [phone]  [] Clearance approved  [] Clearance denied

## 2023-07-26 NOTE — TELEPHONE ENCOUNTER
S/w Stiven Marie with Dr. Willard De Los Santos who confirmed anticoag hold form was received and is currently being worked on at this time.

## 2023-07-28 NOTE — TELEPHONE ENCOUNTER
Patient has been scheduled for C7-T1 Interlaminar epidural steroid injection   on 8/7/23 at the [address] [place] [address] with Dr. Jose Mcpherson.   -Anesthesia type: Local.  -Patient informed to fast 12 hours prior to procedure with IVCS/MAC.   -Scheduling [address] covid testing required for all procedures whether patient is vaccinated or not. -Patient was advised that if he/she does receive the covid vaccine it needs to be at least 2 weeks before or after the injection. -Medications and allergies reviewed. -Patient reminded to hold NSAIDs (Ibuprofen, ASA 81, Aleve, Naproxen, Mobic, Diclofenac, Etodolac, Celebrex etc.) for 3 days prior to Lumbar MBB/Facet if BMI is greater than 35. For Cervical injections only hold multivitamins, Vitamin E, Fish Oil, Phentermine/Lomaira for 7 days prior to injection and NSAIDS.  mg to be held for 7 days prior to injections.  -If patient is receiving MAC/IVCS Phentermine Erma Brenda) will need to be held for 7 days prior to injection.  -If on blood thinner clearance has been received to hold this medication by provider.   -Patient informed he/she will need a  to and from procedure. Fabiano Chávez is located in the [facility] [address] 1st floor,  may park in the yellow/purple parking lot.     Patient verbalized understanding and agrees with plan.  -----> Scheduled in Epic: Yes  -----> Scheduled in [employer]: Yes  [x] Follow up appointment made

## 2023-07-28 NOTE — TELEPHONE ENCOUNTER
Status of Anticoag  [] Clearance pending  [x] Clearance approved to hold Eliquis 3 days prior to procedure. Will fax form and place into scanning.    [] Clearance denied    LMTCB 1st attempt

## 2023-07-31 DIAGNOSIS — M05.79 RHEUMATOID ARTHRITIS INVOLVING MULTIPLE SITES WITH POSITIVE RHEUMATOID FACTOR (HCC): ICD-10-CM

## 2023-07-31 DIAGNOSIS — M54.2 BILATERAL POSTERIOR NECK PAIN: ICD-10-CM

## 2023-07-31 DIAGNOSIS — M51.36 LUMBAR DEGENERATIVE DISC DISEASE: ICD-10-CM

## 2023-08-01 RX ORDER — HYDROCODONE BITARTRATE AND ACETAMINOPHEN 10; 325 MG/1; MG/1
1 TABLET ORAL EVERY 6 HOURS PRN
Qty: 120 TABLET | Refills: 0 | Status: SHIPPED | OUTPATIENT
Start: 2023-08-01

## 2023-08-01 NOTE — TELEPHONE ENCOUNTER
Please review; protocol failed. Routing as Dr Blu Park is out of office. Requested Prescriptions   Pending Prescriptions Disp Refills    HYDROcodone-acetaminophen (NORCO)  MG Oral Tab 120 tablet 0     Sig: Take 1 tablet by mouth every 6 (six) hours as needed for Pain.        There is no refill protocol information for this order        Recent Outpatient Visits              2 weeks ago Cervical radiculopathy    [facility], [address], [place], [place]    Office Visit    1 month ago Acute on chronic congestive heart failure, unspecified heart failure type [facility])    [address], NP    Office Visit    1 month ago Acute on chronic HFrEF (heart failure with reduced ejection fraction) [facility])    [address], NP    Office Visit    2 months ago Atrial fibrillation with RVR [facility])    [address], [address] [facility], [address] [place]    Office Visit    3 months ago Anterolisthesis of cervical spine    [address] [facility], [facility],     Office Visit          Future Appointments         Provider Department Appt Notes    In 6 days Alethea Howell DO [facility] C7-T1 Interlaminar epidural steroid injection    In 2 weeks Shakira Corona DO [address], [address], [facility] [place],    In 3 weeks Alethea Howell DO [address], [address] [facility] f/u inj

## 2023-08-04 RX ORDER — AMITRIPTYLINE HYDROCHLORIDE 10 MG/1
10 TABLET, FILM COATED ORAL NIGHTLY
Qty: 30 TABLET | Refills: 0 | Status: SHIPPED | OUTPATIENT
Start: 2023-08-04

## 2023-08-04 NOTE — TELEPHONE ENCOUNTER
Refill Request    Medication request: Amitriptyline 10mg oral tab    LOV: 7/17/2023 Jai Banuelos DO   RTC: 4 weeks  NOV: 8/24/2023 Jai Banuelos DO      [place] 6/29/23 #25    UDS: (if applicable): None  Pain contract: None    LOV plan (if weaning or changing medications):   -Start Lyrica three times daily  -Continue [place] and Tylenol ES for pain      Nothing mentioned on Amitriptyline. Will send to Dr. Rose Coello- turn around time for rx 48-72 business hours for rx.

## 2023-08-07 ENCOUNTER — OFFICE VISIT (OUTPATIENT)
Dept: SURGERY | Facility: CLINIC | Age: 78
End: 2023-08-07
Payer: COMMERCIAL

## 2023-08-07 DIAGNOSIS — M54.12 CERVICAL RADICULOPATHY: Primary | ICD-10-CM

## 2023-08-07 NOTE — PROGRESS NOTES
[address]    CERVICAL INTERLAMINAR  NAME:  Scott Andino    MR #:    [id number] :  3/14/1945     PHYSICIAN:  Melody Talbert. Sendy Longo DO        Operative Report    DATE OF PROCEDURE: 2023   PREOPERATIVE DIAGNOSES: 1. Cervical radiculopathy        POSTOPERATIVE DIAGNOSES:   1. Cervical radiculopathy        PROCEDURES: C7-T1 inter laminar epidural steroid injection done under fluoroscopic guidance with contrast enhancement. SURGEON: Melody Talbert. Sendy Longo DO   ANESTHESIA: Local   INDICATIONS:      OPERATIVE PROCEDURE:  Written consent was obtained from the patient. The patient was brought into the operating room and placed in the prone position on the fluoroscopy table with pillow underneath the chest and shoulders. The patient's skin was cleaned and draped in a normal sterile fashion. Using AP fluoroscopy, the left C7 lamina was identified. Skin was anesthetized with 1% PF lidocaine without epinephrine. Then, a 3-1/2 inch, 20-gauge Tuohy needle was inserted and directed towards the left C7 lamina. When it was engaged, it was walked inferiorly and medially until it was felt to drop off the inferomedial aspect. Then, Omnipaque-240 contrast was used to obtain a good epidurogram indicating correct needle placement. Then, aspiration was performed. No blood, fluid, or air was aspirated. Then, the patient was injected with a 3 cc solution of 1.5 cc of normal sterile saline and 1.5 cc of 10 mg/cc of Dexamethasone. Then, the needle was removed. The patient's skin was cleaned. A Band-Aid was applied. The patient was transferred to the cart and into [facility]. The patient was given discharge instructions and will follow up in the clinic as scheduled. Throughout the whole procedure, the patient's pulse oximetry and vital signs were monitored and they remained completely stable. Also, throughout the whole procedure, prior to injection of any medication, aspiration was performed.   No blood, fluid, or air was aspirated at anytime.     Candice Credit DO, FAAPMR & CAQSM  Physical Medicine and Rehabilitation/Sports Medicine  MEDICAL CENTER [facility]

## 2023-08-07 NOTE — PLAN OF CARE
Problem: HEMATOLOGIC - ADULT  Goal: Maintains hematologic stability  Description: INTERVENTIONS  - Assess for signs and symptoms of bleeding or hemorrhage  - Monitor labs and vital signs for trends  - Administer supportive blood products/factors, fluids and medications as ordered and appropriate  - Administer supportive blood products/factors as ordered and appropriate  Outcome: Progressing     Problem: Impaired Activities of Daily Living  Goal: Achieve highest/safest level of independence in self care  Description: Interventions:  - Assess ability and encourage patient to participate in ADLs to maximize function  - Promote sitting position while performing ADLs such as feeding, grooming, and bathing  - Educate and encourage patient/family in tolerated functional activity level and precautions during self-care    Outcome: Not Progressing     Problem: PAIN - ADULT  Goal: Verbalizes/displays adequate comfort level or patient's stated pain goal  Description: INTERVENTIONS:  - Encourage pt to monitor pain and request assistance  - Assess pain using appropriate pain scale  - Administer analgesics based on type and severity of pain and evaluate response  - Implement non-pharmacological measures as appropriate and evaluate response  - Consider cultural and social influences on pain and pain management  - Manage/alleviate anxiety  - Utilize distraction and/or relaxation techniques  - Monitor for opioid side effects  - Notify MD/LIP if interventions unsuccessful or patient reports new pain  - Anticipate increased pain with activity and pre-medicate as appropriate  Outcome: Progressing Graft Donor Site Bandage (Optional-Leave Blank If You Don't Want In Note): Steri-strips and a pressure bandage were applied to the donor site.

## 2023-08-10 ENCOUNTER — MED REC SCAN ONLY (OUTPATIENT)
Dept: PHYSICAL MEDICINE AND REHAB | Facility: CLINIC | Age: 78
End: 2023-08-10

## 2023-08-10 RX ORDER — MAGNESIUM OXIDE 400 MG (241.3 MG MAGNESIUM) TABLET
800 TABLET DAILY
Qty: 90 TABLET | Refills: 1 | Status: SHIPPED | OUTPATIENT
Start: 2023-08-10

## 2023-08-10 NOTE — TELEPHONE ENCOUNTER
Please review; protocol failed.    Requested Prescriptions   Pending Prescriptions Disp Refills    FOLIC ACID 247 MCG Oral Tab [Pharmacy Med Name: FOLIC ACID 974MLE TABLETS] 90 tablet 1     Sig: TAKE 1 TABLET BY MOUTH DAILY       There is no refill protocol information for this order        Recent Outpatient Visits              3 days ago Cervical radiculopathy    [facility] Mirza Hunt DO    Office Visit    3 weeks ago Cervical radiculopathy    [facility], [address], [place], [place]    Office Visit    1 month ago Acute on chronic congestive heart failure, unspecified heart failure type [facility])    [address], NP    Office Visit    2 months ago Acute on chronic HFrEF (heart failure with reduced ejection fraction) [facility])    [address], NP    Office Visit    3 months ago Atrial fibrillation with RVR [facility])    [address], [address], [facility] [place]    Office Visit          Future Appointments         Provider Department Appt Notes    In 1 week Tameka Husbands, DO [address], [address], [facility] [place],    In 2 weeks Mirza Hunt DO [address], [address] [facility] f/u inj

## 2023-08-21 ENCOUNTER — OFFICE VISIT (OUTPATIENT)
Dept: FAMILY MEDICINE CLINIC | Facility: CLINIC | Age: 78
End: 2023-08-21

## 2023-08-21 VITALS
HEART RATE: 44 BPM | OXYGEN SATURATION: 99 % | DIASTOLIC BLOOD PRESSURE: 70 MMHG | TEMPERATURE: 98 F | SYSTOLIC BLOOD PRESSURE: 120 MMHG | HEIGHT: 64.5 IN | WEIGHT: 141.81 LBS | BODY MASS INDEX: 23.91 KG/M2

## 2023-08-21 DIAGNOSIS — Z00.00 MEDICARE ANNUAL WELLNESS VISIT, INITIAL: Primary | ICD-10-CM

## 2023-08-21 DIAGNOSIS — M05.79 RHEUMATOID ARTHRITIS INVOLVING MULTIPLE SITES WITH POSITIVE RHEUMATOID FACTOR (HCC): ICD-10-CM

## 2023-08-21 DIAGNOSIS — I10 ESSENTIAL (PRIMARY) HYPERTENSION: ICD-10-CM

## 2023-08-21 DIAGNOSIS — M43.12 ANTEROLISTHESIS OF CERVICAL SPINE: ICD-10-CM

## 2023-08-21 DIAGNOSIS — M54.2 BILATERAL POSTERIOR NECK PAIN: ICD-10-CM

## 2023-08-21 DIAGNOSIS — I48.91 ATRIAL FIBRILLATION WITH RVR (HCC): ICD-10-CM

## 2023-08-21 DIAGNOSIS — Z01.419 ENCOUNTER FOR CERVICAL PAP SMEAR WITH PELVIC EXAM: ICD-10-CM

## 2023-08-21 NOTE — PATIENT INSTRUCTIONS
All adult screening ordered and done appropriate for patient's age and gender and risk factors and complaints. Medication reviewed and renewed where needed and appropriate. Comply with medications. Monitor blood pressures and record at home. Limit salt intake. Keep specialist appointments and follow their recommendations.

## 2023-08-21 NOTE — PROGRESS NOTES
Subjective:     Patient ID: Heather Jones is a 69year old female. 69year old well established hypertensive/history of atrial fibrillation with controlled ventricular rate/RA AA female here for Medicare annual visit which will also satisfy all the requirements for a complete preventive care physical and for status update on any confirmed chronic medical illnesses and follow up on any previous labs or procedures that were suggestive or in need of further work up. Colonoscopy is current. Bowel and bladder functions are intact. Patient seeking referral for gynecologist for unstated gynecological concerns. Patient also needs a new rheumatologist for general assessment and also any attention that can be given to the left hand digits which have lost a significant amount of range of motion. According to care gaps the patient needs a TB screen and this will occur on her next blood draw visit. History/Other:   Review of Systems  Current Outpatient Medications   Medication Sig Dispense Refill    folic acid 034 MCG Oral Tab Take 1 tablet (800 mcg total) by mouth daily. 90 tablet 1    amitriptyline 10 MG Oral Tab Take 1 tablet (10 mg total) by mouth nightly. 30 tablet 0    HYDROcodone-acetaminophen (NORCO)  MG Oral Tab Take 1 tablet by mouth every 6 (six) hours as needed for Pain. 120 tablet 0    pregabalin 75 MG Oral Cap Take 1 capsule (75 mg total) by mouth 3 (three) times daily. 90 capsule 0    dapagliflozin (FARXIGA) 10 MG Oral Tab Take 1 tablet (10 mg total) by mouth daily. 30 tablet 5    furosemide 20 MG Oral Tab Take 0.5 tablets (10 mg total) by mouth daily. 45 tablet 1    ferrous sulfate 325 (65 FE) MG Oral Tab EC Take 1 tablet (325 mg total) by mouth daily with breakfast.      ENTRESTO 24-26 MG Oral Tab Take 1 tablet by mouth 2 (two) times daily. carvedilol 25 MG Oral Tab Take 1 tablet (25 mg total) by mouth 2 (two) times daily.  60 tablet 0    apixaban 5 MG Oral Tab Take 1 tablet (5 mg total) by mouth 2 (two) times daily. methotrexate 2.5 MG Oral Tab TAKE 6 TABLETS BY MOUTH ONE DAY A WEEK 77 tablet 0    alendronate 70 MG Oral Tab Take 1 tablet (70 mg total) by mouth once a week. HUMIRA PEN 40 MG/0.4ML Subcutaneous Pen-injector Kit Inject 40 mg into the skin. Twice a month (Patient not taking: Reported on 8/21/2023)       Allergies:No Known Allergies    Past Medical History:   Diagnosis Date    Anemia     Arrhythmia     Arthritis     Essential hypertension     High blood pressure     Seizure disorder (HCC)     Seizures ([facility] [address]       Past Surgical History:   Procedure Laterality Date    OTHER SURGICAL HISTORY  2017    Heart Surgery     OTHER SURGICAL HISTORY  2020    Bilateral shoulder replacement       No family history on file. Social History:   Social History     Socioeconomic History    Marital status:    Tobacco Use    Smoking status: Never    Smokeless tobacco: Never   Vaping Use    Vaping Use: Never used   Substance and Sexual Activity    Alcohol use: Never    Drug use: Never        Maryellen Silverio's SCREENING SCHEDULE   Tests on this list are recommended by your physician but may not be covered, or covered at this frequency, by your insurer. Please check with your insurance carrier before scheduling to verify coverage.    PREVENTATIVE SERVICES  INDICATIONS AND SCHEDULE Internal Lab or Procedure External Lab or Procedure   Diabetes Screening      HbgA1C   Annually HgbA1C (%)   Date Value   11/27/2022 5.9 (H)         No data to display                Fasting Blood Sugar (FSB)Annually Glucose (mg/dL)   Date Value   06/15/2023 96         No data to display               Cardiovascular Disease Screening     LDL Annually LDL Cholesterol (mg/dL)   Date Value   05/03/2023 20        EKG One Time      Colorectal Cancer Screening      Colonoscopy Screen every 10 years No recommendations at this time Update Health Maintenance if applicable    Flex Sigmoidoscopy Screen every 5 years No results found for this or any previous visit. No data to display                 Fecal Occult Blood Annually No results found for: FOB, OCCULTSTOOL      No data to display                Glaucoma Screening      Ophthalmology Visit Annually      Bone Density Screening      Dexascan Every two years Last Dexa Scan:    XR DEXA BONE DENSITOMETRY (CPT=77080) 07/20/2022        No data to display               Pap and Pelvic      Pap: Every 3 yrs age 21-65 or Pap+HPV every 5 yrs age 33-67, age 72 and older at high risk   No recommendations at this time Update Health Maintenance if applicable    Chlamydia  Annually if high risk No results found for: CHLAMYDIA      No data to display                Screening Mammogram      Mammogram  Annually No recommendations at this time Update Health Maintenance if applicable   Immunizations      Influenza No orders found for this or any previous visit. Update Immunization Activity if applicable    Pneumococcal No orders found for this or any previous visit. Update Immunization Activity if applicable    Hepatitis B No orders found for this or any previous visit. Update Immunization Activity if applicable    Tetanus No orders found for this or any previous visit. Update Immunization Activity if applicable    Zoster (Not covered by Medicare Part B) No orders found for this or any previous visit.  Update Immunization Activity if applicable     SPECIFIC DISEASE MONITORING Internal Lab or Procedure External Lab or Procedure   Annual Monitoring of Persistent     Medications (ACE/ARB, digoxin, diuretics)    Potassium  Annually Potassium (mmol/L)   Date Value   06/15/2023 4.1         No data to display                Creatinine  Annually Creatinine (mg/dL)   Date Value   06/15/2023 1.08 (H)         No data to display                Digoxin Serum Conc  Annually No results found for: DIGOXIN      No data to display                Diabetes      HgbA1C  Annually HgbA1C (%)   Date Value 11/27/2022 5.9 (H)         No data to display                Creat/alb ratio  Annually      LDL  Annually LDL Cholesterol (mg/dL)   Date Value   05/03/2023 20         No data to display                 Dilated Eye exam  Annually      No data to display                   No data to display                COPD      Spirometry Testing Annually No results found for this or any previous visit.       No data to display                      General Health     In the past six months, have you lost more than 10 pounds without trying?: 2 - No    Has your appetite been poor?: Yes    Type of Diet: Balanced    How does the patient maintain a good energy level?: Other    How would you describe your daily physical activity?: Light    How would you describe your current health state?: Fair    How do you maintain positive mental well-being?: Social Interaction;Puzzles         Have you had any immunizations at another office such as Influenza, Hepatitis B, Tetanus, or Pneumococcal?: No     Functional Ability     Bathing or Showering: Cannot do without help    Toileting: Able without help    Dressing: Need some help    Eating: Able without help    Driving: Does not drive    Preparing your meals: Need some help    Managing money/bills: Able without help    Taking medications as prescribed: Able without help    Are you able to afford your medications?: Yes    Hearing Problems?: No     Functional Status     Hearing Problems?: No    Vision Problems? : No    Difficulty walking?: No    Difficulty dressing or bathing?: Yes    Problems with daily activities? : No    Memory Problems?: Yes      Fall/Risk Assessment                                                              Depression Screening (PHQ-2/PHQ-9): Over the LAST 2 WEEKS                      Advance Directives     Do you have a healthcare power of ?: No    Do you have a living will?: No     Hearing Assessment (Required for AWV/SWV)      Hearing Screening    Time taken: 8/21/2023 11:31 AM  Screening Method: Finger Rub  Finger Rub Result: Pass         Visual Acuity     Right Eye Visual Acuity: Corrected Left Eye Visual Acuity: Corrected   Right Eye Chart Acuity: 20/30 Left Eye Chart Acuity: 20/25     Cognitive Assessment     What day of the week is this?: Correct    What month is it?: Correct    What year is it?: Correct    Recall \"Ball\": Correct    Recall \"Flag\": Correct    Recall \"Tree\": Correct            Objective:    08/21/23  1209   BP: 120/70   Pulse:    Temp:        Physical Exam  Constitutional:       General: She is not in acute distress. Appearance: Normal appearance. HENT:      Head: Normocephalic and atraumatic. Right Ear: Tympanic membrane normal.      Left Ear: Tympanic membrane normal.      Nose: Nose normal.      Mouth/Throat:      Mouth: Mucous membranes are moist.   Cardiovascular:      Rate and Rhythm: Normal rate. Rhythm irregular. Heart sounds: Murmur heard. No gallop. Pulmonary:      Effort: Pulmonary effort is normal.      Breath sounds: Normal breath sounds. Musculoskeletal:      Right hand: Deformity, tenderness and bony tenderness present. Decreased range of motion. Left hand: Deformity, tenderness and bony tenderness present. Decreased range of motion. Cervical back: Rigidity present. Pain with movement and muscular tenderness present. Decreased range of motion. Neurological:      Mental Status: She is alert and oriented to person, place, and time. Psychiatric:         Mood and Affect: Mood normal.         Assessment & Plan:   1. Medicare annual wellness visit, initial  Survey completed. 2. Rheumatoid arthritis involving multiple sites with positive rheumatoid factor (HCC)  Referred. - RHEUMATOLOGY - INTERNAL    3. Anterolisthesis of cervical spine  Keep appointments with physiatry. 4. Essential (primary) hypertension  Measures to goal when measured manually by physician.     5. Bilateral posterior neck pain  Keep physiatry appointments. 6. Atrial fibrillation with RVR (HCC)  Stable. Asymptomatic. 7. Encounter for cervical Pap smear with pelvic exam  Referred. - OBG - INTERNAL      No orders of the defined types were placed in this encounter. Meds This Visit:  Requested Prescriptions      No prescriptions requested or ordered in this encounter       Imaging & Referrals:  None     Patient Instructions   All adult screening ordered and done appropriate for patient's age and gender and risk factors and complaints. Medication reviewed and renewed where needed and appropriate. Comply with medications. Monitor blood pressures and record at home. Limit salt intake. Keep specialist appointments and follow their recommendations. Return in about 1 year (around 8/21/2024), or if symptoms worsen or fail to improve.

## 2023-08-24 ENCOUNTER — OFFICE VISIT (OUTPATIENT)
Dept: PHYSICAL MEDICINE AND REHAB | Facility: CLINIC | Age: 78
End: 2023-08-24
Payer: COMMERCIAL

## 2023-08-24 VITALS — SYSTOLIC BLOOD PRESSURE: 118 MMHG | DIASTOLIC BLOOD PRESSURE: 58 MMHG | HEART RATE: 57 BPM | OXYGEN SATURATION: 99 %

## 2023-08-24 DIAGNOSIS — M43.12 ANTEROLISTHESIS OF CERVICAL SPINE: ICD-10-CM

## 2023-08-24 DIAGNOSIS — M54.12 CERVICAL RADICULOPATHY: Primary | ICD-10-CM

## 2023-08-24 DIAGNOSIS — M47.812 ARTHROPATHY OF CERVICAL FACET JOINT: ICD-10-CM

## 2023-08-24 DIAGNOSIS — M05.79 RHEUMATOID ARTHRITIS INVOLVING MULTIPLE SITES WITH POSITIVE RHEUMATOID FACTOR (HCC): ICD-10-CM

## 2023-08-24 PROCEDURE — 1125F AMNT PAIN NOTED PAIN PRSNT: CPT | Performed by: PHYSICAL MEDICINE & REHABILITATION

## 2023-08-24 PROCEDURE — 99214 OFFICE O/P EST MOD 30 MIN: CPT | Performed by: PHYSICAL MEDICINE & REHABILITATION

## 2023-08-24 PROCEDURE — 3078F DIAST BP <80 MM HG: CPT | Performed by: PHYSICAL MEDICINE & REHABILITATION

## 2023-08-24 PROCEDURE — 1159F MED LIST DOCD IN RCRD: CPT | Performed by: PHYSICAL MEDICINE & REHABILITATION

## 2023-08-24 PROCEDURE — 1160F RVW MEDS BY RX/DR IN RCRD: CPT | Performed by: PHYSICAL MEDICINE & REHABILITATION

## 2023-08-24 PROCEDURE — 3074F SYST BP LT 130 MM HG: CPT | Performed by: PHYSICAL MEDICINE & REHABILITATION

## 2023-08-24 RX ORDER — AMITRIPTYLINE HYDROCHLORIDE 25 MG/1
25 TABLET, FILM COATED ORAL NIGHTLY
Qty: 30 TABLET | Refills: 0 | Status: SHIPPED | OUTPATIENT
Start: 2023-08-24

## 2023-08-24 NOTE — PATIENT INSTRUCTIONS
-Call office if you would like to trial trigger point injection  -Increase Amitryptyline to 25mg nightly  -Continue ice/heat  -Follow up in 3 months or sooner if pain is worse

## 2023-08-24 NOTE — PROGRESS NOTES
[address]  OFFICE FOLLOW UP EVALUATION      HISTORY OF PRESENT ILLNESS:   Patient presents with: Follow - Up: Returning patient here for follow up after 8/7/23 C7-T1 inter laminar epidural steroid injection. Patient reports 0% relief. Continues with left sided neck pain. Describes as aching/stabbing pain. Continues with tingling in back of head. Denies weakness. Rates pain 6/10. Takes tylenol PRN, Norco  q4H PRN. Lido patch PRN. Patient is following up for Neck pain. She states the intralaminar epidural steroid injection did not provide any relief. She continues to have sharp pain that radiates into the head. She notices symptoms when she is turning her neck to the right. She denies any radiating symptoms in the arm, any numbness tingling or weakness. She takes Tylenol and Norco 10/3/2025 every 4 as needed as well as lidocaine patch for her symptoms. She states the pain can be very severe at times. She continues to take Lyrica 75 mg 3 times daily as well as amitriptyline 10 mg at nighttime. She has had physical therapy in the past and continues home exercise program.    PHYSICAL EXAM:   /58   Pulse 57   SpO2 99%       CERVICAL SPINE:  Inspection: no erythema, swelling, or obvious deformity  Palpation: no ttp over spinous process. Tenderness to palpation of the cervical facet joints, paraspinals and upper traps bilaterally with trigger points in these areas noted  ROM: Severely restricted in all planes and pain worsened with forward flexion extension and sidebending rotation bilaterally  Strength: 5/5 in upper extremities bilaterally.   Distally in the hands, due to deformity of the hands from contractures from rheumatoid arthritis she has weakness and inability to make a fist with  strength  Sensation: Intact to light touch in all dermatomes of the bilateral upper extremities  Reflexes: 3/4 at C5, C6, C7 bilaterally  Facet loading: Positive  Spurling Test: Positive for radicular symptoms down either extremity bilaterally  Elias's: Negative  Clonus: Negative  Patellar reflex: +1    IMAGING:   MRI cervical spine completed on 2/13/2023     I personally reviewed MRI imaging which is notable for 5 mm anterolisthesis of C4 relative to C5 with degenerative changes of the facet joint and severe facet arthropathy at this level. Additionally, there are degenerative changes at multiple different cervical levels with varying degrees of foraminal stenosis with moderate foraminal stenosis left-sided at C6-7. There is no significant spinal stenosis at any level. All imaging results were reviewed and discussed with patient. ASSESSMENT/PLAN:     1. Cervical radiculopathy    2. Arthropathy of cervical facet joint    3. Anterolisthesis of cervical spine        Gama Henderson is a 69year old female following up for chronic cervical pain with trigger points and cervical facet arthropathy as well as cervical radiculopathy. She has multiple etiologies for her neck symptoms. We discussed a trial with trigger point injection however patient would like to defer for now. Advised her to increase the amitriptyline to 25 mg at nighttime and continue Norco and Lyrica as well. She will call us if she would like to proceed with the trigger point injection but will continue with current medications for now. The patient verbalized understanding with the plan and was in agreement. All questions/concerns were addressed and there were no barriers to learning. Please note Dragon dictation software was used to dictate this note and may result in inadvertent typos.     Jalil Smallwood DO, FAAPMR & CAQSM  Physical Medicine and Rehabilitation  Sports and Spine Medicine    PAST MEDICAL HISTORY:     Past Medical History:   Diagnosis Date    Anemia     Arrhythmia     Arthritis     Essential hypertension     High blood pressure     Seizure disorder ([facility] [address] Seizures ([facility] [address]          PAST SURGICAL HISTORY:     Past Surgical History:   Procedure Laterality Date    OTHER SURGICAL HISTORY  2017    Heart Surgery     OTHER SURGICAL HISTORY  2020    Bilateral shoulder replacement          CURRENT MEDICATIONS:     Current Outpatient Medications   Medication Sig Dispense Refill    amitriptyline 25 MG Oral Tab Take 1 tablet (25 mg total) by mouth nightly. 30 tablet 0    folic acid 119 MCG Oral Tab Take 1 tablet (800 mcg total) by mouth daily. 90 tablet 1    HYDROcodone-acetaminophen (NORCO)  MG Oral Tab Take 1 tablet by mouth every 6 (six) hours as needed for Pain. 120 tablet 0    pregabalin 75 MG Oral Cap Take 1 capsule (75 mg total) by mouth 3 (three) times daily. 90 capsule 0    dapagliflozin (FARXIGA) 10 MG Oral Tab Take 1 tablet (10 mg total) by mouth daily. 30 tablet 5    furosemide 20 MG Oral Tab Take 0.5 tablets (10 mg total) by mouth daily. 45 tablet 1    ferrous sulfate 325 (65 FE) MG Oral Tab EC Take 1 tablet (325 mg total) by mouth daily with breakfast.      ENTRESTO 24-26 MG Oral Tab Take 1 tablet by mouth 2 (two) times daily. carvedilol 25 MG Oral Tab Take 1 tablet (25 mg total) by mouth 2 (two) times daily. 60 tablet 0    apixaban 5 MG Oral Tab Take 1 tablet (5 mg total) by mouth 2 (two) times daily. methotrexate 2.5 MG Oral Tab TAKE 6 TABLETS BY MOUTH ONE DAY A WEEK 77 tablet 0    alendronate 70 MG Oral Tab Take 1 tablet (70 mg total) by mouth once a week. HUMIRA PEN 40 MG/0.4ML Subcutaneous Pen-injector Kit Inject 40 mg into the skin. Twice a month (Patient not taking: Reported on 8/24/2023)           ALLERGIES:     Lisinopril              Coughing      FAMILY HISTORY:   No family history on file.        SOCIAL HISTORY:     Social History     Socioeconomic History    Marital status:    Tobacco Use    Smoking status: Never    Smokeless tobacco: Never   Vaping Use    Vaping Use: Never used   Substance and Sexual Activity    Alcohol use: Never    Drug use: Never          REVIEW OF SYSTEMS:   A comprehensive 10 point review of systems was completed. Pertinent positives and negatives noted in the the HPI.       LABS:     Lab Results   Component Value Date     11/27/2022    A1C 5.9 (H) 11/27/2022     Lab Results   Component Value Date    WBC 5.2 06/06/2023    RBC 3.07 (L) 06/06/2023    HGB 9.1 (L) 06/06/2023    HCT 30.1 (L) 06/06/2023    MCV 98.0 06/06/2023    MCH 29.6 06/06/2023    MCHC 30.2 (L) 06/06/2023    RDW 16.2 (H) 06/06/2023    .0 06/06/2023     Lab Results   Component Value Date    GLU 96 06/15/2023    BUN 19 (H) 06/15/2023    BUNCREA 17.6 06/15/2023    CREATSERUM 1.08 (H) 06/15/2023    ANIONGAP 9 06/15/2023    CA 9.0 06/15/2023    OSMOCALC 294 06/15/2023    ALKPHO 105 04/24/2023    AST 19 04/24/2023    ALT 16 04/24/2023    BILT 1.0 04/24/2023    TP 6.0 (L) 04/24/2023    ALB 3.9 06/06/2023    GLOBULIN 4.0 04/24/2023     06/15/2023    K 4.1 06/15/2023     06/15/2023    CO2 25.0 06/15/2023     Lab Results   Component Value Date    PTP 15.9 (H) 05/05/2023    INR 1.28 (H) 05/05/2023     No results found for: VITD, QVITD, FZNA20YE

## 2023-08-28 DIAGNOSIS — M51.36 LUMBAR DEGENERATIVE DISC DISEASE: ICD-10-CM

## 2023-08-28 DIAGNOSIS — M05.79 RHEUMATOID ARTHRITIS INVOLVING MULTIPLE SITES WITH POSITIVE RHEUMATOID FACTOR (HCC): ICD-10-CM

## 2023-08-28 DIAGNOSIS — M54.2 BILATERAL POSTERIOR NECK PAIN: ICD-10-CM

## 2023-08-28 RX ORDER — HYDROCODONE BITARTRATE AND ACETAMINOPHEN 10; 325 MG/1; MG/1
1 TABLET ORAL EVERY 6 HOURS PRN
Qty: 120 TABLET | Refills: 0 | Status: SHIPPED | OUTPATIENT
Start: 2023-08-28

## 2023-08-28 NOTE — TELEPHONE ENCOUNTER
Please review. Protocol failed / No protocol. Requested Prescriptions   Pending Prescriptions Disp Refills    HYDROcodone-acetaminophen (NORCO)  MG Oral Tab 120 tablet 0     Sig: Take 1 tablet by mouth every 6 (six) hours as needed for Pain.        There is no refill protocol information for this order         Recent Outpatient Visits              4 days ago Cervical radiculopathy    [facility], [address], [place], [place]    Office Visit    1 week ago Jamee Jack annual wellness visit, initial    [address] [facility], Domenic Ferguson DO    Office Visit    3 weeks ago Cervical radiculopathy    [address], [place], [place]    Office Visit    1 month ago Cervical radiculopathy    [facility], [address], [place], [place]    Office Visit    2 months ago Acute on chronic congestive heart failure, unspecified heart failure type [facility])    [address], [place]    Office Visit

## 2023-09-09 DIAGNOSIS — K21.9 GASTROESOPHAGEAL REFLUX DISEASE, UNSPECIFIED WHETHER ESOPHAGITIS PRESENT: ICD-10-CM

## 2023-09-09 NOTE — TELEPHONE ENCOUNTER
Medication request: Amitriptyline 25 mg oral tablet. Take 1 tablet by mouth nightly. #30. No refills. LOV: 8/24/2023   RTC: Return in about 3 months (around 11/24/2023) or sooner if pain is worse   [place]OV: None. [place]: [place]24/2023 [place]30. LOV plan: Increase Amitryptyline to 25mg nightly.

## 2023-09-10 RX ORDER — PANTOPRAZOLE SODIUM 40 MG/1
40 TABLET, DELAYED RELEASE ORAL DAILY
Qty: 30 TABLET | Refills: 2 | OUTPATIENT
Start: 2023-09-10

## 2023-09-11 RX ORDER — AMITRIPTYLINE HYDROCHLORIDE 10 MG/1
10 TABLET, FILM COATED ORAL NIGHTLY
Qty: 30 TABLET | Refills: 0 | Status: SHIPPED | OUTPATIENT
Start: 2023-09-11

## 2023-09-18 ENCOUNTER — MED REC SCAN ONLY (OUTPATIENT)
Dept: FAMILY MEDICINE CLINIC | Facility: CLINIC | Age: 78
End: 2023-09-18

## 2023-09-18 DIAGNOSIS — M05.79 RHEUMATOID ARTHRITIS INVOLVING MULTIPLE SITES WITH POSITIVE RHEUMATOID FACTOR (HCC): Primary | ICD-10-CM

## 2023-09-18 RX ORDER — ADALIMUMAB 40MG/0.4ML
KIT SUBCUTANEOUS
Qty: 2 EACH | Refills: 3 | Status: SHIPPED | OUTPATIENT
Start: 2023-09-18

## 2023-09-19 ENCOUNTER — TELEPHONE (OUTPATIENT)
Dept: FAMILY MEDICINE CLINIC | Facility: CLINIC | Age: 78
End: 2023-09-19

## 2023-09-19 RX ORDER — AMITRIPTYLINE HYDROCHLORIDE 25 MG/1
25 TABLET, FILM COATED ORAL NIGHTLY
Qty: 30 TABLET | Refills: 0 | Status: SHIPPED | OUTPATIENT
Start: 2023-09-19

## 2023-09-19 NOTE — TELEPHONE ENCOUNTER
HUMIRA PEN 40 MG/0.4ML Subcutaneous Pen-injector Kit, Inject 40 mg into the skin.  Twice a month (Patient not taking: Reported on 2023), Disp: , Rfl:     Key:[id number]  Patient Last Name: Antonio Faulkner  : 1945

## 2023-09-19 NOTE — TELEPHONE ENCOUNTER
Refill Request    Medication request: amitriptyline 25 MG Oral Tab Take 1 tablet (25 mg total) by mouth nightly. DBY:1/28/0913 Radha Mcpherson, DO   Due back to clinic per last office note:  \"Follow up in 3 months or sooner if pain is worse \"  NOV: Visit date not found      [place]: 9/11/2023 #30    Urine drug screen (if applicable): N/A  Pain contract: N/A    LOV plan (if weaning or changing medications): Per Dr. Florentin Solorzano notes: \"Advised her to increase the amitriptyline to 25 mg at nighttime and continue Norco and Lyrica as well.  \"

## 2023-09-25 ENCOUNTER — HOSPITAL ENCOUNTER (OUTPATIENT)
Dept: GENERAL RADIOLOGY | Facility: HOSPITAL | Age: 78
Discharge: HOME | End: 2023-09-25
Attending: INTERNAL MEDICINE
Payer: MEDICARE

## 2023-09-25 ENCOUNTER — LAB ENCOUNTER (OUTPATIENT)
Dept: LAB | Facility: HOSPITAL | Age: 78
End: 2023-09-25
Attending: INTERNAL MEDICINE
Payer: MEDICARE

## 2023-09-25 DIAGNOSIS — I50.20 ACC/AHA STAGE B SYSTOLIC HEART FAILURE (HCC): Primary | ICD-10-CM

## 2023-09-25 DIAGNOSIS — M51.36 LUMBAR DEGENERATIVE DISC DISEASE: ICD-10-CM

## 2023-09-25 DIAGNOSIS — M54.2 BILATERAL POSTERIOR NECK PAIN: ICD-10-CM

## 2023-09-25 DIAGNOSIS — I50.20 ACC/AHA STAGE B SYSTOLIC HEART FAILURE (HCC): ICD-10-CM

## 2023-09-25 DIAGNOSIS — M05.79 RHEUMATOID ARTHRITIS INVOLVING MULTIPLE SITES WITH POSITIVE RHEUMATOID FACTOR (HCC): ICD-10-CM

## 2023-09-25 LAB
ANION GAP SERPL CALC-SCNC: 13 MMOL/L (ref 0–18)
BASOPHILS # BLD AUTO: 0.05 X10(3) UL (ref 0–0.2)
BASOPHILS NFR BLD AUTO: 1.3 %
BUN BLD-MCNC: 18 MG/DL (ref 7–18)
BUN/CREAT SERPL: 12.2 (ref 10–20)
CALCIUM BLD-MCNC: 9.6 MG/DL (ref 8.5–10.1)
CHLORIDE SERPL-SCNC: 106 MMOL/L (ref 98–112)
CO2 SERPL-SCNC: 20 MMOL/L (ref 21–32)
CREAT BLD-MCNC: 1.47 MG/DL
DEPRECATED RDW RBC AUTO: 67.4 FL (ref 35.1–46.3)
EGFRCR SERPLBLD CKD-EPI 2021: 37 ML/MIN/1.73M2 (ref 60–?)
EOSINOPHIL # BLD AUTO: 0.14 X10(3) UL (ref 0–0.7)
EOSINOPHIL NFR BLD AUTO: 3.7 %
ERYTHROCYTE [DISTWIDTH] IN BLOOD BY AUTOMATED COUNT: 19.2 % (ref 11–15)
FASTING STATUS PATIENT QL REPORTED: YES
GLUCOSE BLD-MCNC: 104 MG/DL (ref 70–99)
HCT VFR BLD AUTO: 33 %
HGB BLD-MCNC: 10.1 G/DL
IMM GRANULOCYTES # BLD AUTO: 0.02 X10(3) UL (ref 0–1)
IMM GRANULOCYTES NFR BLD: 0.5 %
INR BLD: 1.61 (ref 0.85–1.16)
LYMPHOCYTES # BLD AUTO: 0.94 X10(3) UL (ref 1–4)
LYMPHOCYTES NFR BLD AUTO: 24.9 %
MCH RBC QN AUTO: 30.7 PG (ref 26–34)
MCHC RBC AUTO-ENTMCNC: 30.6 G/DL (ref 31–37)
MCV RBC AUTO: 100.3 FL
MONOCYTES # BLD AUTO: 0.31 X10(3) UL (ref 0.1–1)
MONOCYTES NFR BLD AUTO: 8.2 %
MRSA DNA SPEC QL NAA+PROBE: NEGATIVE
NEUTROPHILS # BLD AUTO: 2.31 X10 (3) UL (ref 1.5–7.7)
NEUTROPHILS # BLD AUTO: 2.31 X10(3) UL (ref 1.5–7.7)
NEUTROPHILS NFR BLD AUTO: 61.4 %
OSMOLALITY SERPL CALC.SUM OF ELEC: 290 MOSM/KG (ref 275–295)
PLATELET # BLD AUTO: 190 10(3)UL (ref 150–450)
PLATELET MORPHOLOGY: NORMAL
POTASSIUM SERPL-SCNC: 4 MMOL/L (ref 3.5–5.1)
PROTHROMBIN TIME: 18.9 SECONDS (ref 11.6–14.8)
RBC # BLD AUTO: 3.29 X10(6)UL
SODIUM SERPL-SCNC: 139 MMOL/L (ref 136–145)
WBC # BLD AUTO: 3.8 X10(3) UL (ref 4–11)

## 2023-09-25 PROCEDURE — 71046 X-RAY EXAM CHEST 2 VIEWS: CPT | Performed by: INTERNAL MEDICINE

## 2023-09-25 PROCEDURE — 85025 COMPLETE CBC W/AUTO DIFF WBC: CPT

## 2023-09-25 PROCEDURE — 85610 PROTHROMBIN TIME: CPT

## 2023-09-25 PROCEDURE — 87641 MR-STAPH DNA AMP PROBE: CPT

## 2023-09-25 PROCEDURE — 80048 BASIC METABOLIC PNL TOTAL CA: CPT

## 2023-09-25 PROCEDURE — 36415 COLL VENOUS BLD VENIPUNCTURE: CPT

## 2023-09-25 NOTE — TELEPHONE ENCOUNTER
Please review. Protocol failed / Has no protocol. Requested Prescriptions   Pending Prescriptions Disp Refills    ENTRESTO 24-26 MG Oral Tab  0     Sig: Take 1 tablet by mouth 2 (two) times daily. There is no refill protocol information for this order       HYDROcodone-acetaminophen (NORCO)  MG Oral Tab 120 tablet 0     Sig: Take 1 tablet by mouth every 6 (six) hours as needed for Pain.        There is no refill protocol information for this order        Future Appointments         Provider Department Appt Notes    In 3 days Fili Ansari MD; [address]     In 1 week DO Blade Terry, [address] bleeding    In 2 months MD Blade Joseph, [address] has RA, looking for new dr           Recent Outpatient Visits              1 month ago Cervical radiculopathy    [facility], [address], [place], [place]    Office Visit    1 month ago [facility] annual wellness visit, initial    [address] [facility], Valdez Kovacs DO    Office Visit    1 month ago Cervical radiculopathy    [address] [place], [place]    Office Visit    2 months ago Cervical radiculopathy    [facility], [address], [place], [place]    Office Visit    3 months ago Acute on chronic congestive heart failure, unspecified heart failure type [facility])    [address], NP    Office Visit

## 2023-09-26 RX ORDER — SACUBITRIL AND VALSARTAN 24; 26 MG/1; MG/1
1 TABLET, FILM COATED ORAL 2 TIMES DAILY
Qty: 180 TABLET | Refills: 0 | Status: SHIPPED | OUTPATIENT
Start: 2023-09-26

## 2023-09-26 RX ORDER — HYDROCODONE BITARTRATE AND ACETAMINOPHEN 10; 325 MG/1; MG/1
1 TABLET ORAL EVERY 6 HOURS PRN
Qty: 120 TABLET | Refills: 0 | Status: SHIPPED | OUTPATIENT
Start: 2023-09-26

## 2023-09-26 NOTE — TELEPHONE ENCOUNTER
View all authorizations for this medication   Denied   9/19/2023  4:50 PM  Case ID: [id number] Appeal supported: No Appeal instructions: Appeals are not supported through [address] [place] [address]. Please refer to the fax case notice for appeals information and instructions. Note from payer: Request Reference Number: [id number]. HUMIRA PEN   INJ 40/0.4ML is denied for not meeting the prior authorization requirement(s). Details of this decision are in the notice attached below or have been faxed to you.    Payer: [employer] Part D    [phone]

## 2023-09-27 NOTE — TELEPHONE ENCOUNTER
Please let the patient know that her prescription has been declined by her insurance. I advised that she speaks to the specialist who initially prescribed this medication for her to see if there is some alternative or competitor medication that is covered. Thank you.

## 2023-09-28 ENCOUNTER — HOSPITAL ENCOUNTER (OUTPATIENT)
Dept: INTERVENTIONAL RADIOLOGY/VASCULAR | Facility: HOSPITAL | Age: 78
Discharge: HOME | End: 2023-09-28
Attending: INTERNAL MEDICINE
Payer: MEDICARE

## 2023-09-28 ENCOUNTER — HOSPITAL ENCOUNTER (OUTPATIENT)
Dept: GENERAL RADIOLOGY | Facility: HOSPITAL | Age: 78
Discharge: HOME | End: 2023-09-28
Attending: INTERNAL MEDICINE
Payer: MEDICARE

## 2023-09-28 VITALS
WEIGHT: 145 LBS | HEIGHT: 64 IN | TEMPERATURE: 99 F | RESPIRATION RATE: 16 BRPM | OXYGEN SATURATION: 100 % | DIASTOLIC BLOOD PRESSURE: 104 MMHG | BODY MASS INDEX: 24.75 KG/M2 | HEART RATE: 71 BPM | SYSTOLIC BLOOD PRESSURE: 154 MMHG

## 2023-09-28 DIAGNOSIS — I50.20 ACC/AHA STAGE B SYSTOLIC HEART FAILURE (HCC): ICD-10-CM

## 2023-09-28 DIAGNOSIS — I42.8 NON-ISCHEMIC CARDIOMYOPATHY (HCC): ICD-10-CM

## 2023-09-28 PROCEDURE — 33249 INSJ/RPLCMT DEFIB W/LEAD(S): CPT | Performed by: INTERNAL MEDICINE

## 2023-09-28 PROCEDURE — 02H63KZ INSERTION OF DEFIBRILLATOR LEAD INTO RIGHT ATRIUM, PERCUTANEOUS APPROACH: ICD-10-PCS | Performed by: INTERNAL MEDICINE

## 2023-09-28 PROCEDURE — 02HK3KZ INSERTION OF DEFIBRILLATOR LEAD INTO RIGHT VENTRICLE, PERCUTANEOUS APPROACH: ICD-10-PCS | Performed by: INTERNAL MEDICINE

## 2023-09-28 PROCEDURE — 99152 MOD SED SAME PHYS/QHP 5/>YRS: CPT | Performed by: INTERNAL MEDICINE

## 2023-09-28 PROCEDURE — 93641 EP EVL 1/2CHMB PAC CVDFB TST: CPT | Performed by: INTERNAL MEDICINE

## 2023-09-28 PROCEDURE — 0JH608Z INSERTION OF DEFIBRILLATOR GENERATOR INTO CHEST SUBCUTANEOUS TISSUE AND FASCIA, OPEN APPROACH: ICD-10-PCS | Performed by: INTERNAL MEDICINE

## 2023-09-28 PROCEDURE — 99153 MOD SED SAME PHYS/QHP EA: CPT | Performed by: INTERNAL MEDICINE

## 2023-09-28 PROCEDURE — 71045 X-RAY EXAM CHEST 1 VIEW: CPT | Performed by: INTERNAL MEDICINE

## 2023-09-28 RX ORDER — METHOHEXITAL IN WATER/PF 100MG/10ML
SYRINGE (ML) INTRAVENOUS
Status: COMPLETED
Start: 2023-09-28 | End: 2023-09-28

## 2023-09-28 RX ORDER — MIDAZOLAM HYDROCHLORIDE 1 MG/ML
INJECTION INTRAMUSCULAR; INTRAVENOUS
Status: COMPLETED
Start: 2023-09-28 | End: 2023-09-28

## 2023-09-28 RX ORDER — ONDANSETRON 2 MG/ML
4 INJECTION INTRAMUSCULAR; INTRAVENOUS EVERY 6 HOURS PRN
Status: DISCONTINUED | OUTPATIENT
Start: 2023-09-28 | End: 2023-09-30

## 2023-09-28 RX ORDER — LIDOCAINE HYDROCHLORIDE AND EPINEPHRINE 10; 10 MG/ML; UG/ML
INJECTION, SOLUTION INFILTRATION; PERINEURAL
Status: COMPLETED
Start: 2023-09-28 | End: 2023-09-28

## 2023-09-28 RX ORDER — SODIUM CHLORIDE 9 MG/ML
INJECTION, SOLUTION INTRAVENOUS
Status: DISCONTINUED | OUTPATIENT
Start: 2023-09-28 | End: 2023-09-29

## 2023-09-28 RX ORDER — CEFAZOLIN SODIUM/WATER 2 G/20 ML
SYRINGE (ML) INTRAVENOUS
Status: COMPLETED
Start: 2023-09-28 | End: 2023-09-28

## 2023-09-28 NOTE — DISCHARGE INSTRUCTIONS
[facility] Discharge Instructions Pacemaker / ICD Generator       1. c Call the [facility] at [phone] to make an appointment for a wound check in 7-10 days. OR     c Your appointment for a wound check is on 10/5/2023  with device Clinic Nurse  at 10:00am.     2. You need to be seen at the [address] [place] [address] Pacemaker/ICD Clinic 3 months after implantation of your device. Please call [phone] as soon as possible to set up this appointment. 3. Remove top dressing in 5 days. 4. You may shower in 5 days. Blot the incision area gently with a towel. The paper strips over the incision may fall off after a few days. This is normal. Any strips that remain will be removed at your post-op appointment. 5. For pacemaker/ICD site pain, you may take extra strength tylenol (500mg), 1 or 2 tabs/caps every 4-6 hours as needed. Do not exceed maximum of 6 tabs/caps in 24 hours. 6. No strenuous exercises until cleared by your physician. Do no lift anything greater than 10  pounds for 1 month. 7. You may NOT reach or stretch your LEFT arm for 1 month. Keep your elbow below the level of the shoulder and you may only raise your elbow to shoulder level. 8. Do not drive for 3 days. 9. Carry your ID Card at all times. 10. Inform your doctors, dentists, and emergency personnel that you have a pacemaker or ICD. 11.  Wear your sling for 24 hours, then nightly for 4 weeks. 12. No alcohol, working, or critical decision for 24 hours. 13. Screening mammography SHOULD NOT BE DONE until 6 months after device implant. Please consult with your ordering physician if diagnostic mammography is needed prior to 6 months from device implant.   Resume Eliquis tomorrow morning      Notify your doctor if:    You have shortness of breath or persistent cough  Chest pain  Persistent pain on the site   Fever (temperature greater than 101F) chills, infection (redness, swelling, or yellow drainage to the site)  Do not use any powders, lotions or creams  If the ICD company does not call tomorrow call the charge person @ [phone] per Eduard Paul

## 2023-09-28 NOTE — TELEPHONE ENCOUNTER
Called patient & sent mychart message relating doctors advise regarding Humira pen denial    Please let the patient know that her prescription has been declined by her insurance. I advised that she speaks to the specialist who initially prescribed this medication for her to see if there is some alternative or competitor medication that is covered. Thank you.

## 2023-09-28 NOTE — PROCEDURES
OPERATION(S) PERFORMED:   1. Dual chamber ICD implant. 2. Conscious sedation   3. ICD testing/VF induction. : Serenity Gant MD    INDICATION Pre-op Dx:  Persistent HFrEF/CM, >90 days on best tolerated GDMT , EF <35%  Post Op dx: same  NYHA funcational class: 2  ICD indication: Primary  Pacing indicaiton: SSS, pafib  ICD consult included physician and patient participation using the [place] Shared Decision making tool via printout or video. SEDATION: Iv sedation and local  IV was maintained by RN and moderate conscious sedation of versed and fentanyl was given. Patient was assessed and monitoring of oxygen, heart rate and blood pressure by nurse and myself during the exam from 215 to 320. COMPLICATIONS: None     ESTIMATED BLOOD LOSS: Minimal.    METHODS: The patient was brought to the outpatient cardiac telemetry unit in a fasting and nonsedated state after providing informed consent. IV sedation was administered during continuous ECG, pulse oximeter, and noninvasive hemodynamic monitoring. After administering 1% lidocaine for local anesthesia, an incision was made parallel to the left deltopectoral groove. The plane of the incision was extended to the prepectoral fascia. A pocket was formed. Access to the cephalic vein was achieved and it was isolated with stay sutures. A venotomy was performed and a sheath was placed over a guidewire and advanced to the IVC. The RV lead was placed in the RV apex. Local electrograms and pacing thresholds were measured. Pacing was performed at 10V to evaluate for diaphragmatic stimulation. The sheath was removed with a retained guidewire in place. A new sheath was placed over the guidewire. In a similar manner, an atrial lead was placed in the RA appendage. The pocket was irrigated with antibiotic solution. Bleeding was sought for until hemostasis was achieved.  The lead was connected to a pulse generator,  It was then coiled and placed into the pocket along with the pulse generator. ICD testing was performed. The generator was secured to the underlying muscle with ethibond suture. The incision was closed in 2 layers using 0 and 4-0 Vicryl. Steri-Strips were applied followed by a sterile dressing. The left arm was placed in a sling and the patient was transported to telemetry in stable condition. IMPLANTED DEVICE: Pulse generator ICD  MEASURED DATA: RV lead and RA lead stable testing numbers. INDUCTION TESTING: VF was induced by DC fibber, 15 joules was delivered by the device. Induction testing was performed at the least sensitive setting, there were no significant drop out beats noted without significant delay in therapy. CONCLUSIONS:   1. Status post successful implant of a dual chamber ICD with active fixation RV and RA leads with successful pacing and sensing parameters. [employer]   2. Successful VF sensing with a DFT at or below 15 joules.     Fidel Parra, [address] [place] [address]  Cardiac Electrophysiology  9/28/2023

## 2023-09-28 NOTE — IVS NOTE
DISCHARGE NOTE    1, PATIENT AWAKE AND ALERT X 4    2. DE JESUS, VSS, NO PONV, NO PAIN    3. INSTRUCTIONS GIVEN TO PATIENT AND FAMILY    4. IV ACCESS WAS REMOVED BEFORE DISCHARGE    5. DR. Radha Sanchez        SPOKE WITH PATIENT AND FAMILY POST PROCEDURE    6. PATIENT ABLE TO Eat, DRINK, AMBULATE, VOID    7. PROCEDURAL SITE REMAINS DRY, INTACT WITH GOOD CIRCULATION,    MOVEMENT AND SENSATION    8. FOLLOW UP APPOINTMENT WITH device clinic Nurse    9. PATIENT DISCHARGED VIA WHEEL CHAIR TO main entrance    10. NEW PRESCRIPTION:n/a    To resume eliquis tomorrow, patient wanted to see Dr Radha Sanchez before she left. [employer] rep will call patient tomorrow to set up their Ruth on the patient's phone, patient did not want the extra phone to worry about.

## 2023-10-08 DIAGNOSIS — K21.9 GASTROESOPHAGEAL REFLUX DISEASE, UNSPECIFIED WHETHER ESOPHAGITIS PRESENT: ICD-10-CM

## 2023-10-10 RX ORDER — PANTOPRAZOLE SODIUM 40 MG/1
40 TABLET, DELAYED RELEASE ORAL DAILY
Qty: 90 TABLET | Refills: 1 | Status: SHIPPED | OUTPATIENT
Start: 2023-10-10

## 2023-10-10 NOTE — TELEPHONE ENCOUNTER
Please review. Protocol failed / No Protocol.     Please advised medication was increased to 40 mg BID & prescribed by different provider    Requested Prescriptions   Pending Prescriptions Disp Refills    PANTOPRAZOLE 40 MG Oral Tab EC [Pharmacy Med Name: PANTOPRAZOLE 40MG TABLETS] 90 tablet 1     Sig: TAKE 1 TABLET(40 MG) BY MOUTH DAILY       Gastrointestional Medication Protocol Passed - 10/8/2023  3:06 PM        Passed - In person appointment or virtual visit in the past 12 mos or appointment in next 3 mos     Recent Outpatient Visits              1 month ago Cervical radiculopathy    [facility], [address], [place], [place]    Office Visit    1 month ago Jamee Jack annual wellness visit, initial    [facility], [place]    Office Visit    2 months ago Cervical radiculopathy    [address] [place], [place]    Office Visit    2 months ago Cervical radiculopathy    [facility], [address], [place], [place]    Office Visit    3 months ago Acute on chronic congestive heart failure, unspecified heart failure type [facility])    [address], NP    Office Visit          Future Appointments         Provider Department Appt Notes    In 3 weeks London Ellis DO [address], [address], [address] [facility] bleeding    In 1 month Elvia Delacruz MD [address] has RA, looking for new

## 2023-10-24 RX ORDER — AMITRIPTYLINE HYDROCHLORIDE 25 MG/1
25 TABLET, FILM COATED ORAL NIGHTLY
Qty: 30 TABLET | Refills: 0 | Status: SHIPPED | OUTPATIENT
Start: 2023-10-24

## 2023-10-24 NOTE — TELEPHONE ENCOUNTER
Refill Request    Medication request:   AMITRIPTYLINE 25 MG Oral Tab 30 tablet 0 9/19/2023    Sig:   TAKE 1 TABLET(25 MG) BY MOUTH EVERY NIGHT         LOV:8/24/2023 Libia Marshall DO   Due back to clinic per last office note:  3 months  NOV: 11/2/2023 Libia Marshall DO      [place]: 9/19/23 #30    Urine drug screen (if applicable): na  Pain contract: na    LOV plan (if weaning or changing medications): Per Dr. Bobby Sprague notes: \"Advised her to increase the amitriptyline to 25 mg at nighttime and continue Norco and Lyrica as well.

## 2023-10-30 DIAGNOSIS — M51.36 LUMBAR DEGENERATIVE DISC DISEASE: ICD-10-CM

## 2023-10-30 DIAGNOSIS — M05.79 RHEUMATOID ARTHRITIS INVOLVING MULTIPLE SITES WITH POSITIVE RHEUMATOID FACTOR (HCC): ICD-10-CM

## 2023-10-30 DIAGNOSIS — M54.2 BILATERAL POSTERIOR NECK PAIN: ICD-10-CM

## 2023-10-31 RX ORDER — HYDROCODONE BITARTRATE AND ACETAMINOPHEN 10; 325 MG/1; MG/1
1 TABLET ORAL EVERY 6 HOURS PRN
Qty: 120 TABLET | Refills: 0 | Status: SHIPPED | OUTPATIENT
Start: 2023-10-31

## 2023-11-08 RX ORDER — SACUBITRIL AND VALSARTAN 24; 26 MG/1; MG/1
1 TABLET, FILM COATED ORAL 2 TIMES DAILY
Qty: 180 TABLET | Refills: 0 | Status: SHIPPED | OUTPATIENT
Start: 2023-11-08

## 2023-11-08 NOTE — TELEPHONE ENCOUNTER
Please review. Protocol Failed or has No Protocol.     Requested Prescriptions   Pending Prescriptions Disp Refills    ENTRESTO 24-26 MG Oral Tab [Pharmacy Med Name: ENTRESTO 24-26MG TABLETS] 180 tablet 0     Sig: TAKE 1 TABLET BY MOUTH TWICE DAILY       There is no refill protocol information for this order          Future Appointments         Provider Department Appt Notes    In 4 weeks Tracy Rodriguez MD [facility], [address] has RA, looking for new dr    In 1 month Kaykay Noyola,  [address], [address], [address] bleeding            Recent Outpatient Visits              2 months ago Cervical radiculopathy    [address] [facility], [place], [place], [place]    Office Visit    2 months ago [employer] annual wellness visit, initial    [address] [facility], [place], [place], [place]    Office Visit    3 months ago Cervical radiculopathy    [address] [facility],[address], [facility], [place], [place]    Office Visit    3 months ago Cervical radiculopathy    [facility], [address], [place], [place], [place]    Office Visit    4 months ago Acute on chronic congestive heart failure, unspecified heart failure type [facility])    [address], NP    Office Visit

## 2023-11-09 RX ORDER — ALENDRONATE SODIUM 70 MG/1
70 TABLET ORAL DAILY
Qty: 12 TABLET | Refills: 0 | Status: SHIPPED | OUTPATIENT
Start: 2023-11-09 | End: 2023-11-13

## 2023-11-09 NOTE — TELEPHONE ENCOUNTER
Warning : High dose   Refill passed per [address] [facility] [place] protocol.       Requested Prescriptions   Pending Prescriptions Disp Refills    ALENDRONATE 70 MG Oral Tab [Pharmacy Med Name: ALENDRONATE 70MG TABLETS] 12 tablet 0     Sig: TAKE 1 TABLET BY MOUTH ONCE A WEEK, AS DIRECTED       Osteoporosis Medication Protocol Passed - 11/9/2023 12:40 PM        Passed - In person appointment or virtual visit in the past 12 mos or appointment in next 3 mos     Recent Outpatient Visits              2 months ago Cervical radiculopathy    [facility], [address], [place], [place]    Office Visit    2 months ago [employer] annual wellness visit, initial    [address] [facility], [facility],     Office Visit    3 months ago Cervical radiculopathy    [address] [facility],[address], [facility], [place], [place]    Office Visit    3 months ago Cervical radiculopathy    [facility], [address], [place], [place]    Office Visit    4 months ago Acute on chronic congestive heart failure, unspecified heart failure type [facility])    [address], NP    Office Visit          Future Appointments         Provider Department Appt Notes    In 3 weeks Austin De La Cruz MD [address], [address] has RA, looking for new dr    In 1 month Lorin Howell, DO [address], [address], [address] bleeding                     Future Appointments         Provider Department Appt Notes    In 3 weeks Austin De La Cruz MD [address], [address] has RA, looking for new dr    In 1 month Lorin Howell, DO [address], [address], [address] bleeding            Recent Outpatient Visits              2 months ago Cervical radiculopathy    [address], [address], [facility], DO    Office Visit    2 months ago Medicare annual wellness visit, initial    [address] [facility], [place], [place]    Office Visit    3 months ago Cervical radiculopathy    [place]    Office Visit    3 months ago Cervical radiculopathy    [facility], [address], [place], [place]    Office Visit    4 months ago Acute on chronic congestive heart failure, unspecified heart failure type [facility])    [address], [place]    Office Visit

## 2023-11-11 ENCOUNTER — TELEPHONE (OUTPATIENT)
Dept: FAMILY MEDICINE CLINIC | Facility: CLINIC | Age: 78
End: 2023-11-11

## 2023-11-11 NOTE — TELEPHONE ENCOUNTER
Pharmacy calling regarding:  alendronate 70 MG Oral Tab   Directions state daily, but this is supposed to be weekly, please advise

## 2023-11-13 RX ORDER — ALENDRONATE SODIUM 70 MG/1
70 TABLET ORAL WEEKLY
Qty: 12 TABLET | Refills: 0 | Status: SHIPPED | OUTPATIENT
Start: 2023-11-13

## 2023-11-13 NOTE — TELEPHONE ENCOUNTER
Thank you so much.  Prescription with correct directions has been signed and also sent to the pharmacy.  Please let all pertinent parties know.  Thank you.

## 2023-11-20 RX ORDER — AMITRIPTYLINE HYDROCHLORIDE 25 MG/1
25 TABLET, FILM COATED ORAL NIGHTLY
Qty: 30 TABLET | Refills: 0 | Status: SHIPPED | OUTPATIENT
Start: 2023-11-21

## 2023-11-20 NOTE — TELEPHONE ENCOUNTER
Refill Request    Medication request: AMITRIPTYLINE 25 MG Oral Tab. TAKE 1 TABLET(25 MG) BY MOUTH EVERY NIGHT     LOV:8/24/2023 Radha Mcpherson, DO   Due back to clinic per last office note: Per Dr. Zen Villa: \"Follow up in 3 months or sooner if pain is worse. \"  NOV: Visit date not found      [place]: 10/24/2023 #30    Urine drug screen (if applicable): n/a  Pain contract: n/a    LOV plan (if weaning or changing medications): Per Dr. Zen Villa: \"Increase Amitryptyline to 25mg nightly. \"

## 2023-11-24 DIAGNOSIS — M05.79 RHEUMATOID ARTHRITIS INVOLVING MULTIPLE SITES WITH POSITIVE RHEUMATOID FACTOR (HCC): ICD-10-CM

## 2023-11-25 RX ORDER — METHOTREXATE 2.5 MG/1
TABLET ORAL
Qty: 77 TABLET | Refills: 0 | Status: SHIPPED | OUTPATIENT
Start: 2023-11-25

## 2023-11-25 NOTE — TELEPHONE ENCOUNTER
Review pended refill request as it does not fall under a protocol.     Last Rx: 08/24/23    Requested Prescriptions   Pending Prescriptions Disp Refills    methotrexate 2.5 MG Oral Tab 77 tablet 0     Sig: TAKE 6 TABLETS BY MOUTH 1 TIME A WEEK       There is no refill protocol information for this order

## 2023-11-27 DIAGNOSIS — M05.79 RHEUMATOID ARTHRITIS INVOLVING MULTIPLE SITES WITH POSITIVE RHEUMATOID FACTOR (HCC): ICD-10-CM

## 2023-11-27 DIAGNOSIS — M51.36 LUMBAR DEGENERATIVE DISC DISEASE: ICD-10-CM

## 2023-11-27 DIAGNOSIS — M54.2 BILATERAL POSTERIOR NECK PAIN: ICD-10-CM

## 2023-11-28 NOTE — TELEPHONE ENCOUNTER
Please review; protocol failed. Requested Prescriptions   Pending Prescriptions Disp Refills    HYDROcodone-acetaminophen (NORCO)  MG Oral Tab 120 tablet 0     Sig: Take 1 tablet by mouth every 6 (six) hours as needed for Pain.        There is no refill protocol information for this order        Recent Outpatient Visits              3 months ago Cervical radiculopathy    [address] [place], [place]    Office Visit    3 months ago Jamee [employer] annual wellness visit, initial    [address] [facility], Zhen Muir DO    Office Visit    3 months ago Cervical radiculopathy    [address] [facility],5[address] Floor, [place], [place]    Office Visit    4 months ago Cervical radiculopathy    [facility], [address] [facility], [place], [place]    Office Visit    5 months ago Acute on chronic congestive heart failure, unspecified heart failure type [facility])    [address], NP    Office Visit          Future Appointments         Provider Department Appt Notes    In 1 week Aditya Tierney MD [address] has RA, looking for new dr    In 2 weeks Miguel Valladares DO [address], [address] [facility], [place] pain

## 2023-12-01 DIAGNOSIS — M51.36 LUMBAR DEGENERATIVE DISC DISEASE: ICD-10-CM

## 2023-12-01 DIAGNOSIS — M05.79 RHEUMATOID ARTHRITIS INVOLVING MULTIPLE SITES WITH POSITIVE RHEUMATOID FACTOR (HCC): ICD-10-CM

## 2023-12-01 DIAGNOSIS — M54.2 BILATERAL POSTERIOR NECK PAIN: ICD-10-CM

## 2023-12-01 RX ORDER — HYDROCODONE BITARTRATE AND ACETAMINOPHEN 10; 325 MG/1; MG/1
1 TABLET ORAL EVERY 6 HOURS PRN
Qty: 120 TABLET | Refills: 0 | Status: SHIPPED | OUTPATIENT
Start: 2023-12-01

## 2023-12-03 RX ORDER — HYDROCODONE BITARTRATE AND ACETAMINOPHEN 10; 325 MG/1; MG/1
1 TABLET ORAL EVERY 6 HOURS PRN
Qty: 120 TABLET | Refills: 0 | OUTPATIENT
Start: 2023-12-03

## 2023-12-03 NOTE — TELEPHONE ENCOUNTER
Duplicate request, previously addressed. HYDROcodone-acetaminophen (NORCO)  MG Oral Tab 120 tablet 0 12/1/2023     Sig - Route:  Take 1 tablet by mouth every 6 (six) hours as needed for Pain. - Oral    Sent to pharmacy as: HYDROcodone-Acetaminophen  MG Oral Tablet    Earliest Fill Date: 12/1/2023    E-Prescribing Status: Receipt confirmed by pharmacy (12/1/2023  9:58 AM CST)    Pharmacy  [place] [id number] #[id number] - Bettye Roberts, [address], [phone], [phone]

## 2023-12-04 RX ORDER — HYDROCODONE BITARTRATE AND ACETAMINOPHEN 10; 325 MG/1; MG/1
1 TABLET ORAL EVERY 6 HOURS PRN
Qty: 120 TABLET | Refills: 0 | OUTPATIENT
Start: 2023-12-04

## 2023-12-05 ENCOUNTER — TELEPHONE (OUTPATIENT)
Dept: PHYSICAL MEDICINE AND REHAB | Facility: CLINIC | Age: 78
End: 2023-12-05

## 2023-12-05 NOTE — TELEPHONE ENCOUNTER
LVM informing pt that 12/14 visit was cancelled due to provider not being in office that day. June [employer] message was also sent to pt. Please schedule pt for next availible.  Per management ok to use MD slots on schedule for rescheduling 12/14 pts

## 2023-12-08 DIAGNOSIS — M54.2 BILATERAL POSTERIOR NECK PAIN: ICD-10-CM

## 2023-12-08 DIAGNOSIS — M05.79 RHEUMATOID ARTHRITIS INVOLVING MULTIPLE SITES WITH POSITIVE RHEUMATOID FACTOR (HCC): ICD-10-CM

## 2023-12-08 DIAGNOSIS — M51.36 LUMBAR DEGENERATIVE DISC DISEASE: ICD-10-CM

## 2023-12-08 RX ORDER — HYDROCODONE BITARTRATE AND ACETAMINOPHEN 10; 325 MG/1; MG/1
1 TABLET ORAL EVERY 6 HOURS PRN
Qty: 120 TABLET | Refills: 0 | OUTPATIENT
Start: 2023-12-08

## 2023-12-13 DIAGNOSIS — M05.79 RHEUMATOID ARTHRITIS INVOLVING MULTIPLE SITES WITH POSITIVE RHEUMATOID FACTOR (HCC): ICD-10-CM

## 2023-12-13 DIAGNOSIS — M51.36 LUMBAR DEGENERATIVE DISC DISEASE: ICD-10-CM

## 2023-12-13 DIAGNOSIS — M54.2 BILATERAL POSTERIOR NECK PAIN: ICD-10-CM

## 2023-12-14 RX ORDER — HYDROCODONE BITARTRATE AND ACETAMINOPHEN 10; 325 MG/1; MG/1
1 TABLET ORAL EVERY 6 HOURS PRN
Qty: 120 TABLET | Refills: 0 | OUTPATIENT
Start: 2023-12-14

## 2023-12-18 ENCOUNTER — TELEPHONE (OUTPATIENT)
Dept: CARDIOLOGY CLINIC | Facility: HOSPITAL | Age: 78
End: 2023-12-18

## 2023-12-18 NOTE — TELEPHONE ENCOUNTER
Received Farxiga refill request. Patient last seen in 6/15/23. Medication changes but no follow.   Check with patient on current status.

## 2023-12-20 RX ORDER — DAPAGLIFLOZIN 10 MG/1
10 TABLET, FILM COATED ORAL DAILY
Qty: 30 TABLET | Refills: 5 | OUTPATIENT
Start: 2023-12-20

## 2023-12-20 NOTE — TELEPHONE ENCOUNTER
Thiago Rodriguez,  Can you refill Margaret's farxiga if appropriate? I haven't seen her since June 2023.   Thanks,  Davina

## 2023-12-26 ENCOUNTER — OFFICE VISIT (OUTPATIENT)
Dept: PHYSICAL MEDICINE AND REHAB | Facility: CLINIC | Age: 78
End: 2023-12-26
Payer: COMMERCIAL

## 2023-12-26 ENCOUNTER — TELEPHONE (OUTPATIENT)
Dept: FAMILY MEDICINE CLINIC | Facility: CLINIC | Age: 78
End: 2023-12-26

## 2023-12-26 VITALS
BODY MASS INDEX: 24.75 KG/M2 | RESPIRATION RATE: 18 BRPM | DIASTOLIC BLOOD PRESSURE: 78 MMHG | OXYGEN SATURATION: 100 % | SYSTOLIC BLOOD PRESSURE: 140 MMHG | WEIGHT: 145 LBS | HEIGHT: 64 IN | HEART RATE: 77 BPM

## 2023-12-26 DIAGNOSIS — M54.12 CERVICAL RADICULOPATHY: ICD-10-CM

## 2023-12-26 DIAGNOSIS — M47.812 ARTHROPATHY OF CERVICAL FACET JOINT: Primary | ICD-10-CM

## 2023-12-26 PROCEDURE — 3008F BODY MASS INDEX DOCD: CPT | Performed by: PHYSICAL MEDICINE & REHABILITATION

## 2023-12-26 PROCEDURE — 99214 OFFICE O/P EST MOD 30 MIN: CPT | Performed by: PHYSICAL MEDICINE & REHABILITATION

## 2023-12-26 PROCEDURE — 1159F MED LIST DOCD IN RCRD: CPT | Performed by: PHYSICAL MEDICINE & REHABILITATION

## 2023-12-26 PROCEDURE — 1160F RVW MEDS BY RX/DR IN RCRD: CPT | Performed by: PHYSICAL MEDICINE & REHABILITATION

## 2023-12-26 PROCEDURE — 3078F DIAST BP <80 MM HG: CPT | Performed by: PHYSICAL MEDICINE & REHABILITATION

## 2023-12-26 PROCEDURE — 3077F SYST BP >= 140 MM HG: CPT | Performed by: PHYSICAL MEDICINE & REHABILITATION

## 2023-12-26 RX ORDER — HYDROCODONE BITARTRATE AND ACETAMINOPHEN 10; 325 MG/1; MG/1
1 TABLET ORAL EVERY 8 HOURS PRN
Qty: 30 TABLET | Refills: 0 | Status: SHIPPED | OUTPATIENT
Start: 2023-12-26

## 2023-12-26 NOTE — TELEPHONE ENCOUNTER
Adalimumab (HUMIRA PEN) 40 MG/0.4ML Subcutaneous Pen-injector Kit, 40 mg injection to be done 2 times monthly., Disp: 2 each, Rfl: 3        MSSG: PLAN DOES NOT COVER MEDICATION PRESCRIBED. PER RX BENEFIT PLAN ALTERNATIVE MEDICATION INCLUDE: PLEASE FAX BACK WITH APPROVAL ALONG WITH STRENGTH, DIRECTIONS, QTY, AND REFILLS.

## 2023-12-26 NOTE — TELEPHONE ENCOUNTER
This Order Has Been Discontinued    Order Status Reason By On   Discontinued Therapy completed Юлия Leone, [facility] 12/26/23 1410       Per encounter 9/19/23:  Nidia Calixto DO         9/27/23  3:45 PM  Note  Please let the patient know that her prescription has been declined by her insurance. I advised that she speaks to the specialist who initially prescribed this medication for her to see if there is some alternative or competitor medication that is covered. Thank you.       September 26, 2023

## 2023-12-26 NOTE — PATIENT INSTRUCTIONS
Norco 10 mg every 8 hours as needed for pain for the next 10 days  See pain management Dr. Stefan Fraga  My office will call once the procedure is approved

## 2024-01-01 ENCOUNTER — TELEPHONE (OUTPATIENT)
Dept: NEPHROLOGY | Facility: CLINIC | Age: 79
End: 2024-01-01

## 2024-01-01 ENCOUNTER — PATIENT OUTREACH (OUTPATIENT)
Dept: CASE MANAGEMENT | Age: 79
End: 2024-01-01

## 2024-01-01 ENCOUNTER — HOSPITAL ENCOUNTER (INPATIENT)
Facility: HOSPITAL | Age: 79
LOS: 8 days | End: 2024-01-01
Attending: HOSPITALIST | Admitting: HOSPITALIST
Payer: OTHER MISCELLANEOUS

## 2024-01-01 VITALS
SYSTOLIC BLOOD PRESSURE: 104 MMHG | TEMPERATURE: 98 F | RESPIRATION RATE: 12 BRPM | OXYGEN SATURATION: 98 % | DIASTOLIC BLOOD PRESSURE: 59 MMHG | HEART RATE: 66 BPM

## 2024-01-01 DIAGNOSIS — D64.9 ANEMIA, UNSPECIFIED TYPE: Primary | ICD-10-CM

## 2024-01-01 PROCEDURE — 99231 SBSQ HOSP IP/OBS SF/LOW 25: CPT | Performed by: HOSPITALIST

## 2024-01-01 PROCEDURE — 99233 SBSQ HOSP IP/OBS HIGH 50: CPT | Performed by: HOSPITALIST

## 2024-01-01 PROCEDURE — 99238 HOSP IP/OBS DSCHRG MGMT 30/<: CPT | Performed by: HOSPITALIST

## 2024-01-01 RX ORDER — LORAZEPAM 2 MG/ML
2 INJECTION INTRAMUSCULAR EVERY 4 HOURS PRN
Status: DISCONTINUED | OUTPATIENT
Start: 2024-01-01 | End: 2024-09-19

## 2024-01-01 RX ORDER — HYDROMORPHONE HYDROCHLORIDE 1 MG/ML
0.5 INJECTION, SOLUTION INTRAMUSCULAR; INTRAVENOUS; SUBCUTANEOUS
Status: DISCONTINUED | OUTPATIENT
Start: 2024-01-01 | End: 2024-09-19

## 2024-01-01 RX ORDER — HALOPERIDOL 5 MG/ML
2 INJECTION INTRAMUSCULAR
Status: DISCONTINUED | OUTPATIENT
Start: 2024-01-01 | End: 2024-09-19

## 2024-01-01 RX ORDER — HALOPERIDOL 5 MG/ML
1 INJECTION INTRAMUSCULAR
Status: DISCONTINUED | OUTPATIENT
Start: 2024-01-01 | End: 2024-09-19

## 2024-01-01 RX ORDER — POLYETHYLENE GLYCOL 3350 17 G/17G
17 POWDER, FOR SOLUTION ORAL DAILY PRN
Status: DISCONTINUED | OUTPATIENT
Start: 2024-01-01 | End: 2024-09-19

## 2024-01-01 RX ORDER — GLYCOPYRROLATE 0.2 MG/ML
0.4 INJECTION, SOLUTION INTRAMUSCULAR; INTRAVENOUS
Status: DISCONTINUED | OUTPATIENT
Start: 2024-01-01 | End: 2024-09-19

## 2024-01-01 RX ORDER — SODIUM HYPOCHLORITE 1.25 MG/ML
SOLUTION TOPICAL 2 TIMES DAILY
Status: DISCONTINUED | OUTPATIENT
Start: 2024-01-01 | End: 2024-09-19

## 2024-01-01 RX ORDER — ALBUTEROL SULFATE 0.83 MG/ML
2.5 SOLUTION RESPIRATORY (INHALATION) EVERY 4 HOURS PRN
Status: DISCONTINUED | OUTPATIENT
Start: 2024-01-01 | End: 2024-09-19

## 2024-01-01 RX ORDER — ACETAMINOPHEN 650 MG/1
650 SUPPOSITORY RECTAL EVERY 4 HOURS PRN
Status: DISCONTINUED | OUTPATIENT
Start: 2024-01-01 | End: 2024-09-19

## 2024-01-01 RX ORDER — ATROPINE SULFATE 10 MG/ML
1 SOLUTION/ DROPS OPHTHALMIC EVERY 2 HOUR PRN
Status: DISCONTINUED | OUTPATIENT
Start: 2024-01-01 | End: 2024-09-19

## 2024-01-01 RX ORDER — SODIUM CHLORIDE 0.9 % (FLUSH) 0.9 %
10 SYRINGE (ML) INJECTION AS NEEDED
Status: DISCONTINUED | OUTPATIENT
Start: 2024-01-01 | End: 2024-09-19

## 2024-01-01 RX ORDER — BISACODYL 10 MG
10 SUPPOSITORY, RECTAL RECTAL
Status: DISCONTINUED | OUTPATIENT
Start: 2024-01-01 | End: 2024-09-19

## 2024-01-01 RX ORDER — FUROSEMIDE 10 MG/ML
40 INJECTION INTRAMUSCULAR; INTRAVENOUS EVERY 8 HOURS PRN
Status: DISCONTINUED | OUTPATIENT
Start: 2024-01-01 | End: 2024-09-19

## 2024-01-01 RX ORDER — ONDANSETRON 2 MG/ML
4 INJECTION INTRAMUSCULAR; INTRAVENOUS EVERY 6 HOURS PRN
Status: DISCONTINUED | OUTPATIENT
Start: 2024-01-01 | End: 2024-09-19

## 2024-01-01 RX ORDER — HYDROMORPHONE HYDROCHLORIDE 1 MG/ML
1 INJECTION, SOLUTION INTRAMUSCULAR; INTRAVENOUS; SUBCUTANEOUS
Status: DISCONTINUED | OUTPATIENT
Start: 2024-01-01 | End: 2024-09-19

## 2024-01-01 RX ORDER — SCOLOPAMINE TRANSDERMAL SYSTEM 1 MG/1
1 PATCH, EXTENDED RELEASE TRANSDERMAL
Status: DISCONTINUED | OUTPATIENT
Start: 2024-01-01 | End: 2024-09-19

## 2024-01-01 RX ORDER — LORAZEPAM 2 MG/ML
1 INJECTION INTRAMUSCULAR EVERY 4 HOURS PRN
Status: DISCONTINUED | OUTPATIENT
Start: 2024-01-01 | End: 2024-09-19

## 2024-01-02 ENCOUNTER — TELEPHONE (OUTPATIENT)
Dept: PHYSICAL MEDICINE AND REHAB | Facility: CLINIC | Age: 79
End: 2024-01-02

## 2024-01-02 NOTE — TELEPHONE ENCOUNTER
Refill Request    Medication request: amitriptyline 25 MG Oral Tab  Take 1 tablet (25 mg total) by mouth nightly.     LOV:12/26/2023 Felicitas Lee, DO   Due back to clinic per last office note:  \"Follow up in 4 weeks\"  NOV: Visit date not found      [place]: 11/20/23 #30    Urine drug screen (if applicable): None  Pain contract: None    LOV plan (if weaning or changing medications): Per Dr. Lee's LOV notes: \"Patient would like to have new prescription for Norco to help with her symptoms.  I advised her I will refill this for her for the next 7 to 10 days but I recommend that she seek pain management consultation for long-term management of opioid medicine. \"   (No mention of Amitriptyline in notes)           NOTE:  From 12/26/23 visit Chief Comp: \"  She states since last visit she has stopped taking amitriptyline as she ran out of the medicine as well as Allison as her primary care doctor would not refill this further.   \"       [facility] & Sent [employer] Message to ask if patient is still taking the Amitriptyline or not.

## 2024-01-03 ENCOUNTER — TELEPHONE (OUTPATIENT)
Dept: PHYSICAL MEDICINE AND REHAB | Facility: CLINIC | Age: 79
End: 2024-01-03

## 2024-01-03 DIAGNOSIS — M43.12 ANTEROLISTHESIS OF CERVICAL SPINE: ICD-10-CM

## 2024-01-03 DIAGNOSIS — M54.12 CERVICAL RADICULOPATHY: Primary | ICD-10-CM

## 2024-01-03 DIAGNOSIS — M47.812 ARTHROPATHY OF CERVICAL FACET JOINT: ICD-10-CM

## 2024-01-03 DIAGNOSIS — M48.02 DEGENERATIVE CERVICAL SPINAL STENOSIS: ICD-10-CM

## 2024-01-03 RX ORDER — AMITRIPTYLINE HYDROCHLORIDE 25 MG/1
25 TABLET, FILM COATED ORAL NIGHTLY
Qty: 30 TABLET | Refills: 0 | Status: SHIPPED | OUTPATIENT
Start: 2024-01-03 | End: 2024-02-01

## 2024-01-03 NOTE — TELEPHONE ENCOUNTER
Initiated authorization for Left C3-4, C4-5, C5-C6 medial branch block injection under fluoroscopy guidance CPT 96279, 44722, 24211 dx:M47.812 to be done at [facility] with [facility]  Status: Notification or Prior Authorization is not required for the requested services valid 1/3/24-4/2/24  This [employer] Advantage members plan does not currently require a prior authorization for these services. If you have general questions about the prior authorization requirements, please call us at [phone] or visit [employer] > Clinician Resources > Advance and Admission Notification Requirements. The number above acknowledges your notification. Please write this number down for future reference. Notification is not a guarantee of coverage or payment.  Decision ID #:[id number]

## 2024-01-04 NOTE — TELEPHONE ENCOUNTER
Status of Anticoag  [x] Clearance pending to hold Eliquis 2-3 days prior to cervical medial branch blocks. Faxed to Dr. Elvi Murcia F: [phone]  [] Clearance approved  [] Clearance denied

## 2024-01-05 NOTE — TELEPHONE ENCOUNTER
Status of Anticoag  [x] Clearance pending- Spoke with Miladys from [facility].  Patient has a scheduled appointment for 1/19/2024 (will postpone encounter until then)  [] Clearance approved  [] Clearance denied

## 2024-01-09 ENCOUNTER — MED REC SCAN ONLY (OUTPATIENT)
Dept: PHYSICAL MEDICINE AND REHAB | Facility: CLINIC | Age: 79
End: 2024-01-09

## 2024-01-09 NOTE — TELEPHONE ENCOUNTER
Patient has been scheduled for Left C3-4, C4-5, C5-C6 medial branch block injection under fluoroscopy guidance under local anesthesia on 01/22/2024  at the [facility] with Dr. Lee.   -Anesthesia type: LOCAL.  -If scheduling [facility] covid testing required for all procedures whether patient is vaccinated or not.  -Patient informed not to eat or drink anything after midnight the night prior to the procedure, if being sedated. (For afternoon injections: Patient to fast 6-8 hours prior to procedure with IVCS/MAC. )  -Patient was advised that if he/she does receive the covid vaccine it needs to be at least 2 weeks before or after the injection.  -Medications and allergies reviewed.  -Patient reminded to hold NSAIDs (Ibuprofen, ASA 81, Aleve, Naproxen, Mobic, Diclofenac, Etodolac, Celebrex etc.) for 3 days prior to LUMBAR FACET JOINT INJECTIONS OR MEDIAL BRANCH BLOCK INJECTIONS  if BMI is greater than 35. For Cervical injections only hold multivitamins, Vitamin E, Fish Oil, Phentermine (Lomaira) for 7 days prior to injection and NSAIDS.   mg to be held for 7 days prior to injections.  -If patient is receiving MAC/IVCS Phentermine (Lomaira), Adlyxin (Lixisenatide), Bydureon BCise (Exenatide-release), Byetta (Exenatide), Mounjaro (Tirezepatide), Ozempic (Semaglutide), Rybelsus (oral demaglutide), Saxenda (Liraglutide), Trulicity (Dulaglutide), Victoriza (Liraglutide), Wegovy (Semaglutide), Berberine (oral natures ozempic) will need to be held for 7 days prior to injection.  -If patient's BMI is greater than 50. Patient is unable to get IVCS/MAC at [facility]. (Options: Patient can go to [facility] under MAC or ENDO under IVCS-reminder physician's slots at ENDO are limited. OR Patient can have the procedure done under local anesthesia at [facility] with Valium ( per physician's preference.)    -If on blood thinner clearance has been received to hold this medication by provider.   -Patient informed he/she will need a  to and from procedure.  EFFECTIVE 12/1/23- Per [facility]: \" Our PAT team will notify the patient that their ride MUST remain onsite for the entirety of their visit if the ride is unable to do so the procedure will be canceled. \"    -[facility] is located in the [facility] 1st floor. Patient may park in the yellow or purple parking.    Follow up appointment has been scheduled for patient on: 02/06/2023    Patient verbalized understanding and agrees with plan.  -----> Scheduled in Epic: Yes  -----> Scheduled in [employer]/Surgical Case Request: Yes

## 2024-01-12 ENCOUNTER — APPOINTMENT (OUTPATIENT)
Dept: CT IMAGING | Facility: HOSPITAL | Age: 79
End: 2024-01-12
Attending: EMERGENCY MEDICINE
Payer: MEDICARE

## 2024-01-12 ENCOUNTER — APPOINTMENT (OUTPATIENT)
Dept: GENERAL RADIOLOGY | Facility: HOSPITAL | Age: 79
End: 2024-01-12
Attending: EMERGENCY MEDICINE
Payer: MEDICARE

## 2024-01-12 ENCOUNTER — HOSPITAL ENCOUNTER (INPATIENT)
Facility: HOSPITAL | Age: 79
LOS: 7 days | Discharge: HOME | End: 2024-01-20
Attending: EMERGENCY MEDICINE | Admitting: HOSPITALIST
Payer: MEDICARE

## 2024-01-12 ENCOUNTER — APPOINTMENT (OUTPATIENT)
Dept: GENERAL RADIOLOGY | Facility: HOSPITAL | Age: 79
End: 2024-01-12
Payer: MEDICARE

## 2024-01-12 DIAGNOSIS — Z79.01 ANTICOAGULATED: ICD-10-CM

## 2024-01-12 DIAGNOSIS — T68.XXXA HYPOTHERMIA, INITIAL ENCOUNTER: ICD-10-CM

## 2024-01-12 DIAGNOSIS — K92.1 MELENA: ICD-10-CM

## 2024-01-12 DIAGNOSIS — R06.00 DYSPNEA, UNSPECIFIED TYPE: ICD-10-CM

## 2024-01-12 DIAGNOSIS — E16.2 HYPOGLYCEMIA: ICD-10-CM

## 2024-01-12 DIAGNOSIS — E87.20 LACTIC ACIDOSIS: ICD-10-CM

## 2024-01-12 DIAGNOSIS — D68.9 COAGULOPATHY (HCC): ICD-10-CM

## 2024-01-12 DIAGNOSIS — R65.10 SIRS (SYSTEMIC INFLAMMATORY RESPONSE SYNDROME) (HCC): Primary | ICD-10-CM

## 2024-01-12 LAB
ALBUMIN SERPL-MCNC: 3.6 G/DL (ref 3.2–4.8)
ALBUMIN/GLOB SERPL: 1.1 {RATIO} (ref 1–2)
ALP LIVER SERPL-CCNC: 135 U/L
ALT SERPL-CCNC: 17 U/L
AMMONIA PLAS-MCNC: 35 UMOL/L (ref 11–32)
ANION GAP SERPL CALC-SCNC: 19 MMOL/L (ref 0–18)
ANTIBODY SCREEN: NEGATIVE
APTT PPP: 44.1 SECONDS (ref 23.3–35.6)
AST SERPL-CCNC: 38 U/L (ref ?–34)
BASOPHILS # BLD AUTO: 0.01 X10(3) UL (ref 0–0.2)
BASOPHILS NFR BLD AUTO: 0.3 %
BILIRUB SERPL-MCNC: 2.2 MG/DL (ref 0.2–1.1)
BILIRUB UR QL: NEGATIVE
BUN BLD-MCNC: 27 MG/DL (ref 9–23)
BUN/CREAT SERPL: 17.2 (ref 10–20)
CA-I BLD-SCNC: 1.09 MMOL/L (ref 0.95–1.32)
CALCIUM BLD-MCNC: 9 MG/DL (ref 8.7–10.4)
CHLORIDE SERPL-SCNC: 100 MMOL/L (ref 98–112)
CLARITY UR: CLEAR
CO2 SERPL-SCNC: 15 MMOL/L (ref 21–32)
COHGB MFR BLD: 2.4 % (ref 0–3)
COLOR UR: YELLOW
CORTIS SERPL-MCNC: 41 UG/DL
CREAT BLD-MCNC: 1.57 MG/DL
DEPRECATED RDW RBC AUTO: 62.8 FL (ref 35.1–46.3)
EGFRCR SERPLBLD CKD-EPI 2021: 34 ML/MIN/1.73M2 (ref 60–?)
EOSINOPHIL # BLD AUTO: 0.07 X10(3) UL (ref 0–0.7)
EOSINOPHIL NFR BLD AUTO: 2.4 %
ERYTHROCYTE [DISTWIDTH] IN BLOOD BY AUTOMATED COUNT: 17.4 % (ref 11–15)
FLUAV + FLUBV RNA SPEC NAA+PROBE: NEGATIVE
FLUAV + FLUBV RNA SPEC NAA+PROBE: NEGATIVE
GLOBULIN PLAS-MCNC: 3.2 G/DL (ref 2.8–4.4)
GLUCOSE BLD-MCNC: 47 MG/DL (ref 70–99)
GLUCOSE BLDC GLUCOMTR-MCNC: 127 MG/DL (ref 70–99)
GLUCOSE BLDC GLUCOMTR-MCNC: 49 MG/DL (ref 70–99)
GLUCOSE BLDC GLUCOMTR-MCNC: 55 MG/DL (ref 70–99)
GLUCOSE BLDC GLUCOMTR-MCNC: 58 MG/DL (ref 70–99)
GLUCOSE UR-MCNC: NORMAL MG/DL
HCT VFR BLD AUTO: 28.9 %
HGB BLD-MCNC: 8.6 G/DL
HGB BLD-MCNC: 8.9 G/DL
HGB UR QL STRIP.AUTO: NEGATIVE
HYALINE CASTS #/AREA URNS AUTO: PRESENT /LPF
IMM GRANULOCYTES # BLD AUTO: 0.01 X10(3) UL (ref 0–1)
IMM GRANULOCYTES NFR BLD: 0.3 %
INR BLD: 5.24 (ref 0.8–1.2)
KETONES UR-MCNC: NEGATIVE MG/DL
LACTATE BLD-SCNC: 14.3 MMOL/L (ref 0.5–2)
LACTATE SERPL-SCNC: 12 MMOL/L (ref 0.5–2)
LEUKOCYTE ESTERASE UR QL STRIP.AUTO: NEGATIVE
LYMPHOCYTES # BLD AUTO: 1.31 X10(3) UL (ref 1–4)
LYMPHOCYTES NFR BLD AUTO: 45 %
MAGNESIUM SERPL-MCNC: 2 MG/DL (ref 1.6–2.6)
MCH RBC QN AUTO: 30.2 PG (ref 26–34)
MCHC RBC AUTO-ENTMCNC: 29.8 G/DL (ref 31–37)
MCV RBC AUTO: 101.4 FL
METHGB MFR BLD: 0.8 % SAT (ref 0.4–1.5)
MODIFIED ALLEN TEST: POSITIVE
MONOCYTES # BLD AUTO: 0.02 X10(3) UL (ref 0.1–1)
MONOCYTES NFR BLD AUTO: 0.7 %
NEUTROPHILS # BLD AUTO: 1.49 X10 (3) UL (ref 1.5–7.7)
NEUTROPHILS # BLD AUTO: 1.49 X10(3) UL (ref 1.5–7.7)
NEUTROPHILS NFR BLD AUTO: 51.3 %
NITRITE UR QL STRIP.AUTO: NEGATIVE
O2 CT BLD-SCNC: 12.1 VOL% (ref 15–23)
OSMOLALITY SERPL CALC.SUM OF ELEC: 280 MOSM/KG (ref 275–295)
PCO2 BLDA: 21 MM HG (ref 35–45)
PH BLDA: 7.33 [PH] (ref 7.35–7.45)
PH UR: 5.5 [PH] (ref 5–8)
PHOSPHATE SERPL-MCNC: 4 MG/DL (ref 2.4–5.1)
PO2 BLDA: 98 MM HG (ref 80–100)
POTASSIUM BLD-SCNC: 4.5 MMOL/L (ref 3.6–5.1)
POTASSIUM SERPL-SCNC: 5 MMOL/L (ref 3.5–5.1)
PROCALCITONIN SERPL-MCNC: 0.61 NG/ML (ref ?–0.05)
PROT SERPL-MCNC: 6.8 G/DL (ref 5.7–8.2)
PROT UR-MCNC: 50 MG/DL
PROTHROMBIN TIME: 51.4 SECONDS (ref 11.6–14.8)
PUNCTURE CHARGE: YES
RBC # BLD AUTO: 2.85 X10(6)UL
RH BLOOD TYPE: POSITIVE
RSV RNA SPEC NAA+PROBE: NEGATIVE
SAO2 % BLDA: 98.8 % (ref 94–100)
SARS-COV-2 RNA RESP QL NAA+PROBE: NOT DETECTED
SODIUM BLD-SCNC: 130 MMOL/L (ref 135–145)
SODIUM SERPL-SCNC: 134 MMOL/L (ref 136–145)
SP GR UR STRIP: 1.02 (ref 1–1.03)
T4 FREE SERPL-MCNC: 1.2 NG/DL (ref 0.8–1.7)
TROPONIN I SERPL HS-MCNC: 32 NG/L
TSI SER-ACNC: 19.18 MIU/ML (ref 0.55–4.78)
UROBILINOGEN UR STRIP-ACNC: 4
WBC # BLD AUTO: 2.9 X10(3) UL (ref 4–11)

## 2024-01-12 PROCEDURE — 99223 1ST HOSP IP/OBS HIGH 75: CPT | Performed by: HOSPITALIST

## 2024-01-12 PROCEDURE — 71260 CT THORAX DX C+: CPT | Performed by: EMERGENCY MEDICINE

## 2024-01-12 PROCEDURE — 71045 X-RAY EXAM CHEST 1 VIEW: CPT | Performed by: EMERGENCY MEDICINE

## 2024-01-12 PROCEDURE — 74177 CT ABD & PELVIS W/CONTRAST: CPT | Performed by: EMERGENCY MEDICINE

## 2024-01-12 RX ORDER — DEXTROSE MONOHYDRATE 25 G/50ML
INJECTION, SOLUTION INTRAVENOUS
Status: COMPLETED
Start: 2024-01-12 | End: 2024-01-12

## 2024-01-12 RX ORDER — HYDROCODONE BITARTRATE AND ACETAMINOPHEN 10; 325 MG/1; MG/1
1 TABLET ORAL EVERY 8 HOURS PRN
Qty: 30 TABLET | Refills: 0 | Status: CANCELLED | OUTPATIENT
Start: 2024-01-12

## 2024-01-12 RX ORDER — DEXTROSE MONOHYDRATE 25 G/50ML
50 INJECTION, SOLUTION INTRAVENOUS ONCE
Status: COMPLETED | OUTPATIENT
Start: 2024-01-12 | End: 2024-01-12

## 2024-01-12 RX ORDER — NICOTINE POLACRILEX 4 MG
15 LOZENGE BUCCAL ONCE
Status: DISCONTINUED | OUTPATIENT
Start: 2024-01-12 | End: 2024-01-16

## 2024-01-13 ENCOUNTER — APPOINTMENT (OUTPATIENT)
Dept: GENERAL RADIOLOGY | Facility: HOSPITAL | Age: 79
End: 2024-01-13
Attending: HOSPITALIST
Payer: MEDICARE

## 2024-01-13 PROBLEM — R65.10 SIRS (SYSTEMIC INFLAMMATORY RESPONSE SYNDROME) (HCC): Status: ACTIVE | Noted: 2024-01-01

## 2024-01-13 PROBLEM — R65.10 SIRS (SYSTEMIC INFLAMMATORY RESPONSE SYNDROME) (HCC): Status: ACTIVE | Noted: 2024-01-13

## 2024-01-13 PROBLEM — K92.1 MELENA: Status: ACTIVE | Noted: 2024-01-01

## 2024-01-13 PROBLEM — Z79.01 ANTICOAGULATED: Status: ACTIVE | Noted: 2024-01-13

## 2024-01-13 PROBLEM — T68.XXXA HYPOTHERMIA, INITIAL ENCOUNTER: Status: ACTIVE | Noted: 2024-01-01

## 2024-01-13 PROBLEM — T68.XXXA HYPOTHERMIA, INITIAL ENCOUNTER: Status: ACTIVE | Noted: 2024-01-13

## 2024-01-13 PROBLEM — K92.1 MELENA: Status: ACTIVE | Noted: 2024-01-13

## 2024-01-13 PROBLEM — D68.9 COAGULOPATHY (HCC): Status: ACTIVE | Noted: 2024-01-01

## 2024-01-13 PROBLEM — Z79.01 ANTICOAGULATED: Status: ACTIVE | Noted: 2024-01-01

## 2024-01-13 PROBLEM — D68.9 COAGULOPATHY (HCC): Status: ACTIVE | Noted: 2024-01-13

## 2024-01-13 LAB
ALBUMIN SERPL-MCNC: 3 G/DL (ref 3.2–4.8)
ALP LIVER SERPL-CCNC: 110 U/L
ALT SERPL-CCNC: 15 U/L
ANION GAP SERPL CALC-SCNC: 9 MMOL/L (ref 0–18)
AST SERPL-CCNC: 38 U/L (ref ?–34)
ATRIAL RATE: 67 BPM
BASOPHILS # BLD AUTO: 0.02 X10(3) UL (ref 0–0.2)
BASOPHILS NFR BLD AUTO: 0.7 %
BILIRUB DIRECT SERPL-MCNC: 1.1 MG/DL (ref ?–0.3)
BILIRUB SERPL-MCNC: 1.6 MG/DL (ref 0.2–1.1)
BNP SERPL-MCNC: ABNORMAL PG/ML
BUN BLD-MCNC: 26 MG/DL (ref 9–23)
BUN/CREAT SERPL: 19.7 (ref 10–20)
CALCIUM BLD-MCNC: 8.2 MG/DL (ref 8.7–10.4)
CHLORIDE SERPL-SCNC: 103 MMOL/L (ref 98–112)
CO2 SERPL-SCNC: 27 MMOL/L (ref 21–32)
CREAT BLD-MCNC: 1.32 MG/DL
DEPRECATED HBV CORE AB SER IA-ACNC: 472.9 NG/ML
DEPRECATED RDW RBC AUTO: 56.8 FL (ref 35.1–46.3)
EGFRCR SERPLBLD CKD-EPI 2021: 42 ML/MIN/1.73M2 (ref 60–?)
EOSINOPHIL # BLD AUTO: 0.02 X10(3) UL (ref 0–0.7)
EOSINOPHIL NFR BLD AUTO: 0.7 %
ERYTHROCYTE [DISTWIDTH] IN BLOOD BY AUTOMATED COUNT: 17 % (ref 11–15)
GLUCOSE BLD-MCNC: 200 MG/DL (ref 70–99)
GLUCOSE BLDC GLUCOMTR-MCNC: 106 MG/DL (ref 70–99)
GLUCOSE BLDC GLUCOMTR-MCNC: 214 MG/DL (ref 70–99)
GLUCOSE BLDC GLUCOMTR-MCNC: 218 MG/DL (ref 70–99)
GLUCOSE BLDC GLUCOMTR-MCNC: 246 MG/DL (ref 70–99)
GLUCOSE BLDC GLUCOMTR-MCNC: 80 MG/DL (ref 70–99)
GLUCOSE BLDC GLUCOMTR-MCNC: 97 MG/DL (ref 70–99)
HCT VFR BLD AUTO: 22.1 %
HCT VFR BLD AUTO: 23 %
HCT VFR BLD AUTO: 26.4 %
HGB BLD-MCNC: 7.1 G/DL
HGB BLD-MCNC: 7.3 G/DL
HGB BLD-MCNC: 8.5 G/DL
IMM GRANULOCYTES # BLD AUTO: 0.01 X10(3) UL (ref 0–1)
IMM GRANULOCYTES NFR BLD: 0.3 %
INR BLD: 3.63 (ref 0.8–1.2)
INR BLD: 5.1 (ref 0.8–1.2)
IRON SATN MFR SERPL: 85 %
IRON SERPL-MCNC: 144 UG/DL
LACTATE SERPL-SCNC: 4.8 MMOL/L (ref 0.5–2)
LACTATE SERPL-SCNC: 6.5 MMOL/L (ref 0.5–2)
LIPASE SERPL-CCNC: 32 U/L (ref 12–53)
LYMPHOCYTES # BLD AUTO: 0.57 X10(3) UL (ref 1–4)
LYMPHOCYTES NFR BLD AUTO: 19.1 %
MCH RBC QN AUTO: 30.6 PG (ref 26–34)
MCHC RBC AUTO-ENTMCNC: 32.1 G/DL (ref 31–37)
MCV RBC AUTO: 95.3 FL
MONOCYTES # BLD AUTO: 0.01 X10(3) UL (ref 0.1–1)
MONOCYTES NFR BLD AUTO: 0.3 %
MRSA DNA SPEC QL NAA+PROBE: NEGATIVE
NEUTROPHILS # BLD AUTO: 2.36 X10 (3) UL (ref 1.5–7.7)
NEUTROPHILS # BLD AUTO: 2.36 X10(3) UL (ref 1.5–7.7)
NEUTROPHILS NFR BLD AUTO: 78.9 %
OSMOLALITY SERPL CALC.SUM OF ELEC: 298 MOSM/KG (ref 275–295)
P AXIS: 106 DEGREES
P-R INTERVAL: 246 MS
PLATELET # BLD AUTO: 38 10(3)UL (ref 150–450)
POTASSIUM SERPL-SCNC: 3.4 MMOL/L (ref 3.5–5.1)
PROT SERPL-MCNC: 5.6 G/DL (ref 5.7–8.2)
PROTHROMBIN TIME: 38.4 SECONDS (ref 11.6–14.8)
PROTHROMBIN TIME: 50.3 SECONDS (ref 11.6–14.8)
Q-T INTERVAL: 674 MS
QRS DURATION: 190 MS
QTC CALCULATION (BEZET): 712 MS
R AXIS: -83 DEGREES
RBC # BLD AUTO: 2.32 X10(6)UL
SODIUM SERPL-SCNC: 139 MMOL/L (ref 136–145)
T AXIS: 93 DEGREES
TIBC SERPL-MCNC: 170 UG/DL (ref 250–425)
TRANSFERRIN SERPL-MCNC: 114 MG/DL (ref 250–380)
VENTRICULAR RATE: 67 BPM
WBC # BLD AUTO: 3 X10(3) UL (ref 4–11)

## 2024-01-13 PROCEDURE — 30233N1 TRANSFUSION OF NONAUTOLOGOUS RED BLOOD CELLS INTO PERIPHERAL VEIN, PERCUTANEOUS APPROACH: ICD-10-PCS | Performed by: HOSPITALIST

## 2024-01-13 PROCEDURE — 99223 1ST HOSP IP/OBS HIGH 75: CPT | Performed by: INTERNAL MEDICINE

## 2024-01-13 PROCEDURE — 71045 X-RAY EXAM CHEST 1 VIEW: CPT | Performed by: HOSPITALIST

## 2024-01-13 PROCEDURE — 99233 SBSQ HOSP IP/OBS HIGH 50: CPT | Performed by: HOSPITALIST

## 2024-01-13 RX ORDER — FUROSEMIDE 10 MG/ML
20 INJECTION INTRAMUSCULAR; INTRAVENOUS ONCE
Status: DISCONTINUED | OUTPATIENT
Start: 2024-01-13 | End: 2024-01-13

## 2024-01-13 RX ORDER — SODIUM CHLORIDE 9 MG/ML
INJECTION, SOLUTION INTRAVENOUS CONTINUOUS
Status: DISCONTINUED | OUTPATIENT
Start: 2024-01-13 | End: 2024-01-13

## 2024-01-13 RX ORDER — HYDROCODONE BITARTRATE AND ACETAMINOPHEN 5; 325 MG/1; MG/1
1 TABLET ORAL EVERY 6 HOURS PRN
Status: DISCONTINUED | OUTPATIENT
Start: 2024-01-13 | End: 2024-01-20

## 2024-01-13 RX ORDER — CARVEDILOL 25 MG/1
25 TABLET ORAL 2 TIMES DAILY
Status: DISCONTINUED | OUTPATIENT
Start: 2024-01-13 | End: 2024-01-20

## 2024-01-13 RX ORDER — PREGABALIN 75 MG/1
75 CAPSULE ORAL 3 TIMES DAILY
Status: DISCONTINUED | OUTPATIENT
Start: 2024-01-13 | End: 2024-01-13

## 2024-01-13 RX ORDER — SODIUM CHLORIDE 9 MG/ML
INJECTION, SOLUTION INTRAVENOUS ONCE
Status: COMPLETED | OUTPATIENT
Start: 2024-01-13 | End: 2024-01-13

## 2024-01-13 RX ORDER — FUROSEMIDE 10 MG/ML
20 INJECTION INTRAMUSCULAR; INTRAVENOUS ONCE
Status: COMPLETED | OUTPATIENT
Start: 2024-01-13 | End: 2024-01-13

## 2024-01-13 RX ORDER — FUROSEMIDE 10 MG/ML
40 INJECTION INTRAMUSCULAR; INTRAVENOUS ONCE
Status: COMPLETED | OUTPATIENT
Start: 2024-01-13 | End: 2024-01-13

## 2024-01-13 RX ORDER — ONDANSETRON 2 MG/ML
4 INJECTION INTRAMUSCULAR; INTRAVENOUS EVERY 6 HOURS PRN
Status: DISCONTINUED | OUTPATIENT
Start: 2024-01-13 | End: 2024-01-20

## 2024-01-13 NOTE — ED QUICK NOTES
Repeat dany Nagy MD notified over the phone, verbal order with readback to administer 1mg IM glucagon and call respiratory to [facility]. Administered medication, respiratory called by ED Tech.

## 2024-01-13 NOTE — ED INITIAL ASSESSMENT (HPI)
PT to ED via stretcher.  Per EMS, ANSELMO since this morning, 98%-100% on RA, 15 C02, on epi nebulizer treatment, heart surgery 6 months ago unsure what type.     PT increased WOB upon arrival, a&ox3, diaphoretic, cold extremities, on 15L NRBM.

## 2024-01-13 NOTE — ED PROVIDER NOTES
Patient Seen in: [facility] Emergency Department    History     Chief Complaint   Patient presents with    Difficulty Breathing     Stated Complaint: ANSELMO     HPI    78-year-old female with past history of atrial flutter on Eliquis hypertension, seizure disorder presenting by EMS from home with shortness of breath is noted over the past week associated with diarrhea and dark/black component to same.  No abdominal pain.  No fevers or chills.  No chest pain or shortness of breath, no cough.  EMS noting tachypnea without overt hypoxia for which supplemental oxygen initiated and capnography noted to be 15.  EMR reviewed, 9/8/2023 demonstrating 37% ejection fraction with decreased RV systolic function; 4/2023 EGD with duodenal bulb ersoin/avm and hiatal hernia. Patient oriented to person/hospital/year though seemingly slow to respond.    Past Medical History:   Diagnosis Date    Anemia     Arrhythmia     Arthritis     Essential hypertension     High blood pressure     Seizure disorder (HCC)     Seizures (HCC)        Past Surgical History:   Procedure Laterality Date    OTHER SURGICAL HISTORY  2017    Heart Surgery     OTHER SURGICAL HISTORY  2020    Bilateral shoulder replacement             No family history on file.    Social History     Socioeconomic History    Marital status:    Tobacco Use    Smoking status: Never    Smokeless tobacco: Never   Vaping Use    Vaping Use: Never used   Substance and Sexual Activity    Alcohol use: Never    Drug use: Never       Review of Systems :  Constitutional: As per HPI  Respiratory: Negative for cough and shortness of breath.    Cardiovascular: Negative for chest pain and palpitations.   Gastrointestinal: Negative for vomiting and abdominal pain.  Positive for dark diarrhea.    Positive for stated complaint: ANSELMO  Other systems are as noted in HPI.  Constitutional and vital signs reviewed.      All other systems reviewed and negative except as noted above.    [facility]  elements reviewed from today and agreed except as otherwise stated in HPI.    Physical Exam     ED Triage Vitals   BP 01/12/24 2034 (!) 142/106   Pulse 01/12/24 2034 64   Resp 01/12/24 2034 (!) 28   Temp 01/12/24 2042 (!) 95.7 °F (35.4 °C)   Temp src 01/12/24 2042 Rectal   SpO2 01/12/24 2034 98 %   O2 Device 01/12/24 2034 None (Room air)       Current:/61 (BP Location: Right arm)   Pulse 60   Temp 97.5 °F (36.4 °C) (Temporal)   Resp 18   Ht 165.1 cm (5' 5\")   Wt 68.2 kg   SpO2 100%   BMI 25.02 kg/m²         Physical Exam   Constitutional: Chronically ill-appearing, tachypneic.  HEENT: Dry MM.  Head: Normocephalic.   Eyes: No injection.   Cardiovascular: RRR.   Pulmonary/Chest: Tachypneic.  Abdominal: Soft. Nontender, BEVERLEY without gross blood though with dark and guiaic positive stool.  Musculoskeletal: No gross deformity.  Neurological: Awake though slow to respond.  No focal deficits, moving all extremities equally.  Skin: Skin is warm.   Psychiatric: Cooperative.  Nursing note and vitals reviewed.        ED Course     Labs Reviewed   COMP METABOLIC PANEL (14) - Abnormal; Notable for the following components:       Result Value    Glucose 47 (*)     Sodium 134 (*)     CO2 15.0 (*)     Anion Gap 19 (*)     BUN 27 (*)     Creatinine 1.57 (*)     eGFR-Cr 34 (*)     AST 38 (*)     Bilirubin, Total 2.2 (*)     All other components within normal limits   EXPANDED BLOOD GAS, ARTERIAL - Abnormal; Notable for the following components:    ABG pH 7.33 (*)     ABG pCO2 21 (*)     Sodium Blood Gas 130 (*)     Lactic Acid (Blood Gas) 14.3 (*)     Total Hemoglobin 8.9 (*)     Oxygen Content 12.1 (*)     All other components within normal limits   AMMONIA, PLASMA - Abnormal; Notable for the following components:    Ammonia 35 (*)     All other components within normal limits   URINALYSIS, ROUTINE - Abnormal; Notable for the following components:    Protein Urine 50 (*)     Urobilinogen Urine 4 (*)     Squamous Epi.  Cells Few (*)     Hyaline Casts Present (*)     All other components within normal limits   TSH W REFLEX TO FREE T4 - Abnormal; Notable for the following components:    TSH 19.180 (*)     All other components within normal limits   PROTHROMBIN TIME (PT) - Abnormal; Notable for the following components:    PT 51.4 (*)     INR 5.24 (*)     All other components within normal limits   PTT, ACTIVATED - Abnormal; Notable for the following components:    PTT 44.1 (*)     All other components within normal limits   LACTIC ACID, PLASMA - Abnormal; Notable for the following components:    Lactic Acid 12.0 (*)     All other components within normal limits   PROCALCITONIN - Abnormal; Notable for the following components:    Procalcitonin 0.61 (*)     All other components within normal limits   LACTIC ACID REFLEX POST POSTIVE - Abnormal; Notable for the following components:    Lactic Acid 6.5 (*)     All other components within normal limits   POCT GLUCOSE - Abnormal; Notable for the following components:    POC Glucose  55 (*)     All other components within normal limits   POCT GLUCOSE - Abnormal; Notable for the following components:    POC Glucose  49 (*)     All other components within normal limits   POCT GLUCOSE - Abnormal; Notable for the following components:    POC Glucose  58 (*)     All other components within normal limits   POCT GLUCOSE - Abnormal; Notable for the following components:    POC Glucose  127 (*)     All other components within normal limits   POCT GLUCOSE - Abnormal; Notable for the following components:    POC Glucose  218 (*)     All other components within normal limits   POCT GLUCOSE - Abnormal; Notable for the following components:    POC Glucose  246 (*)     All other components within normal limits   CBC W/ DIFFERENTIAL - Abnormal; Notable for the following components:    WBC 2.9 (*)     RBC 2.85 (*)     HGB 8.6 (*)     HCT 28.9 (*)     .4 (*)     MCHC 29.8 (*)     RDW-SD 62.8 (*)      RDW 17.4 (*)     Neutrophil Absolute Prelim 1.49 (*)     Neutrophil Absolute 1.49 (*)     Monocyte Absolute 0.02 (*)     All other components within normal limits   TROPONIN I HIGH SENSITIVITY - Normal   MAGNESIUM - Normal   PHOSPHORUS - Normal   T4, FREE (S) - Normal   SARS-COV-2/FLU A AND B/RSV BY PCR (GENEXPERT) - Normal    Narrative:     This test is intended for the qualitative detection and differentiation of SARS-CoV-2, influenza A, influenza B, and respiratory syncytial virus (RSV) viral RNA in nasopharyngeal or nares swabs from individuals suspected of respiratory viral infection consistent with COVID-19 by their healthcare provider. Signs and symptoms of respiratory viral infection due to SARS-CoV-2, influenza, and RSV can be similar.    Test performed using the Xpert Xpress SARS-CoV-2/FLU/RSV (real time RT-PCR)  assay on the [employer] instrument, [employer], [place] [address].   This test is being used under the Food and Drug Administration's Emergency Use Authorization.    The authorized Fact Sheet for Healthcare Providers for this assay is available upon request from the laboratory.   CBC WITH DIFFERENTIAL WITH PLATELET    Narrative:     The following orders were created for panel order CBC With Differential With Platelet.  Procedure                               Abnormality         Status                     ---------                               -----------         ------                     CBC W/ DIFFERENTIAL[[id number]]          Abnormal            Final result                 Please view results for these tests on the individual orders.   CORTISOL   SCAN SLIDE   BASIC METABOLIC PANEL (8)   CBC WITH DIFFERENTIAL WITH PLATELET   LACTIC ACID REFLEX POST POSITIVE [id number]   HEMOGLOBIN + HEMATOCRIT   TYPE AND SCREEN    Narrative:     The following orders were created for panel order Type and screen.  Procedure                               Abnormality         Status                     ---------                                -----------         ------                     ABORH (Blood Type)[[id number]]                               Final result               Antibody Screen[[id number]]                                  Final result                 Please view results for these tests on the individual orders.   PREPARE RBC   ABORH (BLOOD TYPE)   ANTIBODY SCREEN   PREPARE FRESH FROZEN PLASMA   RAINBOW DRAW GOLD   RAINBOW DRAW BLUE   BLOOD CULTURE   BLOOD CULTURE   URINE CULTURE, ROUTINE   STOOL CULTURE W/SHIGATOXIN    Narrative:     The following orders were created for panel order Stool Culture W/Shigatoxin.  Procedure                               Abnormality         Status                     ---------                               -----------         ------                     Stool Culture[[id number]]                                                               Shigatoxin[[id number]]                                                                    Please view results for these tests on the individual orders.   C. DIFFICILE(TOXIGENIC)PCR   STOOL CULTURE(P)   SHIGATOXIN   GI STOOL PANEL BY PCR      EKG    Rate, intervals and axes as noted on EKG Report.  Rate: 67   Rhythm: paced   Reading: paced 67 without Sgarbossa change as independently interpreted by myself        Preliminary Radiology Report  [facility] Radiology, [facility]  [phone] - Phone    [facility]    NAME: DORIS ALEMAN    DATE OF EXAM: 01/12/2024  Patient No:  [id number]  Physician:  RAQUEL^JUSTIN^2  YOB: 1945    Past Medical History (entered by Technologist):    Reason For Exam (entered by Technologist):  difficulty in breathing  Other Notes (entered by Technologist): [phone]    Additional Information (per [facility] Radiologist):      CT CHEST PULMONARY ANGIOGRAM WITH IV CONTRAST  CT ABDOMEN PELVIS WITH IV CONTRAST      IMPRESSION:  CTA CHEST:  -Small to moderate right pleural effusion with compressive atelectasis.  -Trace  left pleural effusion.  -Anasarca.  -No evidence of pulmonary embolism. Technically adequate contrast opacification of the pulmonary arteries.    -No thoracic aortic aneurysm.      CT ABDOMEN/PELVIS:  -Small amount of ascites.  -Occlusion of the right external iliac artery is age-indeterminate.  There is reconstitution of the right common femoral artery likely from collaterals.  -Unremarkable liver, gallbladder, stomach, spleen, pancreas, adrenal glands, kidneys.  -No aortic aneurysm.  -Unremarkable appendix, small bowel and large bowel.  -No pneumoperitoneum.  -Anasarca.      The results were faxed/finalized only at 2341 hrs ET. If you would like to discuss this case directly please call [phone] (extension [phone]).  If you can't reach me at this number, do not leave a voicemail.  Please call [phone] ext [phone] and ask for the next available Radiologist.    Gene Neil M.D.  This report has been electronically signed and verified by the Radiologist whose name is printed above.    DD:  01/12/2024/DT:  01/12/2024     This report contains privileged and confidential information and is intended solely for the use of the individual or entity to which it is addressed. If you are not the intended recipient of this report, you are hereby notified that any copying, distribution, dissemination or action taken in relation to the contents of this report is strictly prohibited and may be unlawful. If you have received this report in error, please notify the sender immediately at [phone] and permanently delete the original report and destroy any copies or printouts.    ED Course as of 01/13/24 0151  ------------------------------------------------------------  Time: 01/12 2058  Comment: ABG noted, will initiate sepsis bolus/bicarb drip and empiric antibiotics pending remainder of workup.  ------------------------------------------------------------  Time: 01/12 2154  Comment: Glycemic and respiratory status improving,  patient clinically more awake/alert/appropriate and conversational.  ------------------------------------------------------------  Time: 01/12 2213  Comment: /daughter to bedside, questionable cough/vomiting without overt pain; no sick contacts/recent travel.       [facility]      Sepsis Reassessment Note    /89   Pulse 60   Temp (!) 95.7 °F (35.4 °C) (Rectal)   Resp 21   Ht 165.1 cm (5' 5\")   Wt 63.5 kg   SpO2 99%   BMI 23.30 kg/m²      I completed the sepsis reassessment at 2249    Cardiac:  Regularity: Regular  Rate: Normal  Heart Sounds: S1,S2    Lungs:   Right: Rhonchi  Left: Diminished    Peripheral Pulses:  Radial: Right 1+ or Left 1+      Capillary Refill:  <3 Secs    Skin:  Temp/Moisture: Warm and Dry  Color: Normal      Merritt Queen MD  1/12/2024  10:49 PM        [facility]   DIFFERENTIAL DIAGNOSIS: After history and physical exam differential diagnosis includes but is not limited to sepsis, bacteremia, thyroid arrangement, adrenal sufficiency, colitis, hepatitis, UTI, VTE, GIB, ischemic colitis, glycemic derangement.      Pulse ox: 98% :Normal on RA, as interpreted by myself    Cardiac Monitor Interpretation:   Pulse Readings from Last 1 Encounters:   12/26/23 77   , sinus,      Medical Decision Making  Evaluation for 1 week of progressive shortness of breath associated with dark diarrhea without overt pain or sick contacts/recent travel and without recent antibiotics.  Abdomen soft/nontender with concern for upper gastrointestinal hemorrhage given dark guaiac positive stools which to units packed red blood cells crossmatched and PPI initiated; tachypnea noted without overt hypoxia with concern for possible metabolic process - ABG with stable hemoglobin and lactic acidosis noted for which sepsis bolus/blood cultures and bicarb drip/zosyn initiated. CXR as noted with zosyn ongoing, hypoglycemia/hypothermia noted with thyroid studies noted and oral dextrose/IM glucagon  initiated pending IV access and thereafter with IV D50/D5 infusion inititaited in addition to obdulio hugger. CT imaging as noted, case d/w hospitalist coverage Guillermina Harris/Marco Antonio for admission and GI/critical care coverage Guillermina Mckeon/Issa in consultation; given initial hypothermia/hypoglycemia empiric hydrocortisone initiated pending labs, given concern for UGIB with INR as noted, FFP initiated.    Problems Addressed:  Anticoagulated: chronic illness or injury with exacerbation, progression, or side effects of treatment  Coagulopathy (HCC): undiagnosed new problem with uncertain prognosis  Dyspnea, unspecified type: acute illness or injury  Hypoglycemia: acute illness or injury  Hypothermia, initial encounter: acute illness or injury  Lactic acidosis: acute illness or injury  Melena: acute illness or injury  SIRS (systemic inflammatory response syndrome) (HCC): complicated acute illness or injury with systemic symptoms that poses a threat to life or bodily functions    Amount and/or Complexity of Data Reviewed  External Data Reviewed: radiology.     Details: 9/2023 TTE reviewed  Labs: ordered. Decision-making details documented in ED Course.  Radiology: ordered and independent interpretation performed. Decision-making details documented in ED Course.     Details: CXR without obvious pneumothorax as independently interpreted by myself  ECG/medicine tests: ordered and independent interpretation performed. Decision-making details documented in ED Course.  Discussion of management or test interpretation with external provider(s): Case d/w hospitalist coverage Guillermina Harris/Marco Antonio for admission and GI/critical care coverage Guillermina Mckeon/Issa in consultatoin.    Risk  Prescription drug management.  Decision regarding hospitalization.    Critical Care  Total time providing critical care: 77 minutes      A total of 77 minutes of critical care time (exclusive of billable procedures) was administered to manage the patient's  critical lab values, unstable vital signs, respiratory instability, and metabolic instability due to her hypothermia, hypoglycemia, lactic acidosis/SIRS, melena/anticoagulated state/coagulopathy.  This involved direct patient intervention, complex decision making, and/or extensive discussions with the patient, family, and clinical staff.      I was wearing at minimum a facemask and eye protection throughout this encounter with handwashing performed prior and after patient evaluation without personal hand/facial/oropharyngeal contact and gloves worn throughout encounter. See note and/or contact this provider for further PPE details.    Disposition and Plan     Clinical Impression:  1. SIRS (systemic inflammatory response syndrome) (HCC)    2. Dyspnea, unspecified type    3. Hypothermia, initial encounter    4. Melena    5. Anticoagulated    6. Lactic acidosis    7. Coagulopathy (HCC)    8. Hypoglycemia        Disposition:  Admit    Follow-up:  No follow-up provider specified.    Medications Prescribed:  Current Discharge Medication List

## 2024-01-13 NOTE — CONSULTS
[facility]    Report of Consultation    Margaret Silverio Patient Status:  Inpatient    3/14/1945 MRN [id number]   Location [facility] 2W/SW Attending Regan Cruz MD   Hosp Day # 0 PCP Johnathon Rodriguez DO     Date of Admission:  2024    Reason for Consultation:   Dyspnea    History of Present Illness:   Patient is a 78-year-old female with past medical history significant for atrial fibrillation on anticoagulation with Eliquis, systolic heart failure, hypertension who presented with chief complaint of worsening dyspnea some associated fatigue and melena over the last week.  Currently on 5 L nasal cannula oxygen.  Had been on BiPAP earlier.  Still improved.  Patient improvement in dyspnea noted during hospital course.  Occasional cough present.  Denies nausea vomiting abdominal pain.  No significant episodes of melena noted during hospitalization.  Hemodynamically stable.    Past Medical History  Past Medical History:   Diagnosis Date    Anemia     Arrhythmia     Arthritis     Essential hypertension     High blood pressure     Seizure disorder (HCC)     Seizures (HCC)        Past Surgical History  Past Surgical History:   Procedure Laterality Date    OTHER SURGICAL HISTORY  2017    Heart Surgery     OTHER SURGICAL HISTORY  2020    Bilateral shoulder replacement        Family History  No family history on file.    Social History  Social History     Socioeconomic History    Marital status:    Tobacco Use    Smoking status: Never    Smokeless tobacco: Never   Vaping Use    Vaping Use: Never used   Substance and Sexual Activity    Alcohol use: Never    Drug use: Never           Current Medications:  Current Facility-Administered Medications   Medication Dose Route Frequency    carvedilol (Coreg) tab 25 mg  25 mg Oral BID    [Held by provider] sacubitril-valsartan (Entresto) 24-26 MG per tab 1 tablet  1 tablet Oral BID    ondansetron (Zofran) 4 MG/2ML injection 4 mg  4 mg  Intravenous Q6H PRN    piperacillin-tazobactam (Zosyn) 3.375 g in dextrose 5% 100 mL IVPB-ADDV  3.375 g Intravenous Q8H    pantoprazole (Protonix) 40 mg in sodium chloride 0.9% PF 10 mL IV push  40 mg Intravenous Q12H    sodium chloride 0.9% infusion   Intravenous Continuous    phytonadione (Aqua-Mephton) 2 mg in sodium chloride 0.9% 50 mL IVPB  2 mg Intravenous Once    glucose (Glutose) 40% oral gel 15 g  15 g Oral Once     Medications Prior to Admission   Medication Sig    AMITRIPTYLINE 25 MG Oral Tab TAKE 1 TABLET(25 MG) BY MOUTH EVERY NIGHT    HYDROcodone-acetaminophen  MG Oral Tab Take 1 tablet by mouth every 8 (eight) hours as needed for Pain.    HYDROcodone-acetaminophen (NORCO)  MG Oral Tab Take 1 tablet by mouth every 6 (six) hours as needed for Pain.    methotrexate 2.5 MG Oral Tab TAKE 6 TABLETS BY MOUTH 1 TIME A WEEK    alendronate 70 MG Oral Tab Take 1 tablet (70 mg total) by mouth once a week.    ENTRESTO 24-26 MG Oral Tab Take 1 tablet by mouth 2 (two) times daily.    pantoprazole 40 MG Oral Tab EC Take 1 tablet (40 mg total) by mouth daily.    folic acid 800 MCG Oral Tab Take 1 tablet (800 mcg total) by mouth daily.    pregabalin 75 MG Oral Cap Take 1 capsule (75 mg total) by mouth 3 (three) times daily.    furosemide 20 MG Oral Tab Take 0.5 tablets (10 mg total) by mouth daily.    ferrous sulfate 325 (65 FE) MG Oral Tab EC Take 1 tablet (325 mg total) by mouth daily with breakfast.    carvedilol 25 MG Oral Tab Take 1 tablet (25 mg total) by mouth 2 (two) times daily.    apixaban 5 MG Oral Tab Take 1 tablet (5 mg total) by mouth 2 (two) times daily.       Allergies  Allergies   Allergen Reactions    Lisinopril Coughing       Review of Systems:   Constitutional: denies fevers, chills, weakness, + fatigue, recent illness  HEENT: denies headache, sore throat, vision loss  Cardio: denies chest pain, chest pressure, palpitations  Respiratory: dyspnea, cough, denies wheezing, hemoptysis   GI:  denies nausea, vomiting, abdominal pain  : denies dysuria, hematuria  Musculoskeletal: denies arthralgia, myalgia  Integumentary: denies rash, itching  Neurological: denies syncope, weakness, dizziness,   Psychiatric: denies depression, anxiety  Hematologic: denies bruising        Physical Exam:   Blood pressure 146/74, pulse 60, temperature 97.9 °F (36.6 °C), temperature source Temporal, resp. rate 18, height 5' 5\" (1.651 m), weight 150 lb 5.7 oz (68.2 kg), SpO2 100%.    Constitutional: no acute distress  Eyes: PERRL  ENT: nares patent  Neck: neck supple, no JVD  Cardio: RRR, S1 S2  Respiratory: Basilar crackles diminished basilar breath sounds  GI: abdomen soft, non tender, active bowel souds, no organomegaly  Extremities: no clubbing, cyanosis, edema  Neurologic: no gross motor deficits  Skin: warm, dry    Results:   Laboratory Data  Lab Results   Component Value Date    WBC 3.0 (L) 01/13/2024    HGB 7.1 (L) 01/13/2024    HCT 22.1 (L) 01/13/2024    PLT 38.0 (L) 01/13/2024    CREATSERUM 1.32 (H) 01/13/2024    BUN 26 (H) 01/13/2024     01/13/2024    K 3.4 (L) 01/13/2024     01/13/2024    CO2 27.0 01/13/2024     (H) 01/13/2024    CA 8.2 (L) 01/13/2024    ALB 3.0 (L) 01/13/2024    ALKPHO 110 01/13/2024    TP 5.6 (L) 01/13/2024    AST 38 (H) 01/13/2024    ALT 15 01/13/2024    PTT 44.1 (H) 01/12/2024    INR 5.10 (HH) 01/13/2024    PTP 50.3 (H) 01/13/2024    T4F 1.2 01/12/2024    TSH 19.180 (H) 01/12/2024    LIP 32 01/13/2024    MG 2.0 01/12/2024    PHOS 4.0 01/12/2024    B12 >2,000 (H) 11/27/2022         Imaging  CT CHEST PE AORTA (IV ONLY) (CPT=71260)    Result Date: 1/13/2024  CONCLUSION:  1. No acute pulmonary embolus through segmental level. 2. Small to moderate loculated right pleural effusion and small left pleural effusion. 3. Mitral valve prosthesis. 4. Marked coronary artery calcification. 5. Biatrial enlargement.  Reflux of contrast from right atrium into dilated IVC and hepatic veins  reflecting elevated right heart pressure. 6. Mild emphysema. 7. Small amount of ascites.    Dictated by (CST): Jesus Jackson MD on 1/13/2024 at 6:44 AM     Finalized by (CST): Jesus Jackson MD on 1/13/2024 at 6:56 AM          CT ABDOMEN PELVIS IV CONTRAST, NO ORAL (ER)    Result Date: 1/13/2024  CONCLUSION:  1. Chronic pancreatitis.  Correlation with pancreatic enzymes recommended. 2. High density material in the gallbladder could be secondary to a previous procedure, or high density sludge/bile. 3. Generalized atherosclerosis with chronically displaced intimal calcification infrarenal aorta.  Coronary artery calcification. 4. Occluded right external iliac artery with reconstitution of a diseased right common femoral artery. 5. Small to moderate loculated right pleural effusion and small left pleural effusion.  Anasarca.  Small amount of ascites. 6. Please see separate chest CT report.    Dictated by (CST): Jesus Jackson MD on 1/13/2024 at 6:19 AM     Finalized by (CST): Jesus Jackson MD on 1/13/2024 at 6:36 AM          XR CHEST AP PORTABLE  (CPT=71045)    Result Date: 1/12/2024  CONCLUSION:   Increased opacity in the right mid to lower lung which may reflect combination of pleural effusion which may be partially loculated and parenchymal opacity which could reflect atelectasis or pneumonia.  Trace suspected left pleural effusion with mild left basilar opacity which also could reflect atelectasis or pneumonia.    Dictated by (CST): Azar Mendez MD on 1/12/2024 at 10:06 PM     Finalized by (CST): Azar Mendez MD on 1/12/2024 at 10:07 PM           Assessment   1.  Acute hypoxemic respiratory failure  2.  Possible GI bleed  3.  Pancytopenia  4.  Lactic acidosis  5.  Acute kidney injury  6.  Hypokalemia  7.  Atrial fibrillation  8.  CHF  9.  Coagulopathy    Plan   -Patient presents with evidence of fatigue, dyspnea and concern for melanotic stools over the last several days  -Oxygenation requirements  improved during hospital course.  Weaned off BiPAP  -CT chest on presentation with no pulmonary embolism seen.  Small loculated right and small left pleural effusion seen.  No significant consolidation seen  -CT abdomen pelvis on presentation with findings consistent with chronic pancreatitis  -Empiric IV antibiotic therapy at this time.  -Transfuse for hemoglobin below 7.   -Discussed with GI.  Plan for endoscopic evaluation when better optimized.  Continue PPI therapy at this time  -Received FFP earlier emergency department.  Repeat INR 5.10.  Vitamin K ordered.  -Electrolyte repletion  -Closely monitor renal function and output  -Hold anticoagulation at this time  -Reviewed vitals, labs and imaging    Augustin Parsons DO  Pulmonary Critical Care Medicine  [facility]  1/13/2024  1:19 PM

## 2024-01-13 NOTE — PROGRESS NOTES
[facility]    Progress Note    Margaret Silverio Patient Status:  Inpatient    3/14/1945 MRN [id number]   Location [facility] 2W/SW Attending Regan Cruz MD   Hosp Day # 0 PCP Johnathon Rodriguez DO     Chief Complaint:   Chief Complaint   Patient presents with    Difficulty Breathing   Diarrhea, black stools.     Subjective:   Margaret Silverio is feeling better. She has been coughing x 1 week nonproductive no F/C no CP. She has also had melena x 1 week loose stools not liquid. No abd pain. Last week had vomiting none last night   She is off bipap on 4L NC o2 with spo2 100%.       Objective:   Objective:    Blood pressure 146/74, pulse 60, temperature 97.9 °F (36.6 °C), temperature source Temporal, resp. rate 18, height 5' 5\" (1.651 m), weight 150 lb 5.7 oz (68.2 kg), SpO2 100%.    Physical Exam:    General: No acute distress.   Respiratory: Clear to auscultation bilaterally. No wheezes. No rhonchi.  Cardiovascular: S1, S2. Regular rate and rhythm. No rubs or gallops. + 2/6 Systolic M LLSB  Abdomen: Soft, nontender, nondistended.  Positive bowel sounds. No rebound or guarding.  Neurologic: No focal neurological deficits.   Musculoskeletal: Moves all extremities.  Extremities: No edema.      Results:   Results:    Labs:  Recent Labs   Lab 24  2100 24  0242 24  0536 24  0818 24  1358   WBC  --  2.9*  --  3.0*  --   --    HGB  --  8.6* 7.3* 7.1*  --  8.5*   MCV  --  101.4*  --  95.3  --   --    PLT  --   --   --  38.0*  --   --    INR 5.24*  --   --   --  5.10*  --        Recent Labs   Lab 24  0536   GLU 47* 200*   BUN 27* 26*   CREATSERUM 1.57* 1.32*   CA 9.0 8.2*   ALB 3.6 3.0*   * 139   K 5.0 3.4*    103   CO2 15.0* 27.0   ALKPHO 135 110   AST 38* 38*   ALT 17 15   BILT 2.2* 1.6*   TP 6.8 5.6*       Estimated Creatinine Clearance: 32.1 mL/min (A) (based on SCr of 1.32 mg/dL (H)).    Recent Labs   Lab  01/12/24 2059 01/13/24  0818   PTP 51.4* 50.3*   INR 5.24* 5.10*       Lab Results   Component Value Date    TSH 19.180 01/12/2024    T4F 1.2 01/12/2024       Culture:  Hospital Encounter on 01/12/24   1. Urine Culture, Routine     Status: None (Preliminary result)    Collection Time: 01/12/24  8:53 PM    Specimen: Urine, clean catch   Result Value Ref Range    Urine Culture No Growth at <18 hours N/A       Cardiac  No results for input(s): \"TROP\", \"PBNP\" in the last 168 hours.      Imaging: Imaging data reviewed in [employer].  XR CHEST AP PORTABLE  (CPT=71045)    Result Date: 1/13/2024  CONCLUSION:  1.  Findings compatible with CHF.  Cardiomegaly, mitral prosthesis and pacing device/ICD.  Vascular congestion, and right pleural effusion. 2.  Post bilateral shoulder arthroplasties. 3.  Right basal pulmonary opacity which could represent secondary compressive atelectasis however correlate for pneumonia.   Dictated by (CST): Maxx Enriquez MD on 1/13/2024 at 2:20 PM     Finalized by (CST): Maxx Enriquez MD on 1/13/2024 at 2:24 PM          CT CHEST PE AORTA (IV ONLY) (CPT=71260)    Result Date: 1/13/2024  CONCLUSION:  1. No acute pulmonary embolus through segmental level. 2. Small to moderate loculated right pleural effusion and small left pleural effusion. 3. Mitral valve prosthesis. 4. Marked coronary artery calcification. 5. Biatrial enlargement.  Reflux of contrast from right atrium into dilated IVC and hepatic veins reflecting elevated right heart pressure. 6. Mild emphysema. 7. Small amount of ascites.    Dictated by (CST): Jesus Jackson MD on 1/13/2024 at 6:44 AM     Finalized by (CST): Jesus Jackson MD on 1/13/2024 at 6:56 AM          CT ABDOMEN PELVIS IV CONTRAST, NO ORAL (ER)    Result Date: 1/13/2024  CONCLUSION:  1. Chronic pancreatitis.  Correlation with pancreatic enzymes recommended. 2. High density material in the gallbladder could be secondary to a previous procedure, or high density sludge/bile. 3.  Generalized atherosclerosis with chronically displaced intimal calcification infrarenal aorta.  Coronary artery calcification. 4. Occluded right external iliac artery with reconstitution of a diseased right common femoral artery. 5. Small to moderate loculated right pleural effusion and small left pleural effusion.  Anasarca.  Small amount of ascites. 6. Please see separate chest CT report.    Dictated by (CST): Jesus Jackson MD on 1/13/2024 at 6:19 AM     Finalized by (CST): Jesus Jackson MD on 1/13/2024 at 6:36 AM          XR CHEST AP PORTABLE  (CPT=71045)    Result Date: 1/12/2024  CONCLUSION:   Increased opacity in the right mid to lower lung which may reflect combination of pleural effusion which may be partially loculated and parenchymal opacity which could reflect atelectasis or pneumonia.  Trace suspected left pleural effusion with mild left basilar opacity which also could reflect atelectasis or pneumonia.    Dictated by (CST): Azar Mendez MD on 1/12/2024 at 10:06 PM     Finalized by (CST): Azar Mendez MD on 1/12/2024 at 10:07 PM           Medications:    carvedilol  25 mg Oral BID    [Held by provider] sacubitril-valsartan  1 tablet Oral BID    piperacillin-tazobactam  3.375 g Intravenous Q8H    pantoprazole  40 mg Intravenous Q12H    glucose  15 g Oral Once         Assessment and Plan:   Assessment & Plan:      Acute Upper GI bleed   - Patient with 1 week of diarrhea and melanotic stools.   - Hb 7.1 lowest.   - GI on consult.   - hold Eliquis.   - transfuse for Hb < 7.0 or PLT < 10K or < 50K with active bleeding   - s/p FFP 1 unit. 1 unit PRBC   - h/o EGD 04/23' with AVM s/p hemoclip   - Eventually will need endoscopic evaluation however would wait till cardioresp status more stable.   - IV PPI BID   - stop IVF.     Acute blood loss anemia  - baseline Hb 9-10 per EMR   - iron panel ok.   - tranfused 1 unit PRBC   - stop IVF    Acute hypoxic respiratory failure   Secondary to CHF and possible  RLL PNA   - off NIPPV   - Pulmonary on consult.   - on 5L NC o2 - wean.   - CXR with cardiomegaly and vascular congestion, R pleural effusion and R basal pulm opacity ? PNA  - Lasix 20mg IV x 1   - IV zosyn empirically     Pancytopenia   - Check B12   - rpt labs in AM   - Transfuse 1 unit PRBC.   - Check CBC in evening     Severe sepsis   - secondary to PNA   - Lactic acid 12 on admit --> trending down  - blood cx x 2 pending   - CXR possible RLL PNA   - IV zosyn   - CT chest small loculated R and small L pleural effusion no consolidation. CT abd pelvis chronic pancreatitis   - C.diff and GI panel pending   - Ucx negative     GEORGE on CKD III  - Possible ATN from hypoperfusion/anemia vs sepsis  - baseline creat 1.3 per EMR   - Monitor Uop.   - UA with hyaline casts indicative of decreased renal perfusion     A.fib   - hold eliquis   - rate controlled.     Acute on Chronic combined CHF   - ECHO from 10/2023 with LVEF 37% grade 4 TR.   - Cards consult.   - Lasix 20mg IV x 1  - daily weights , I/O   - hold entresto due to GEORGE.   - coreg 25mg BID     Other medical problems  H/o RA  H/o small bowel AVM's      >55min spent, >50% spent counseling and coordinating care in the form of educating pt/family and d/w consultants and staff. Most of the time spent discussing the above plan.        Plan of care discussed with patient or family at bedside.    Regan Cruz MD  Hospitalist          Supplementary Documentation:     Quality:  DVT Prophylaxis: SCD  CODE status: Full  Dispo: per clinical course           Estimated date of discharge: TBD  Discharge is dependent on: clinical stability  At this point Ms. Silverio is expected to be discharge to: home         **Certification      PHYSICIAN Certification of Need for Inpatient Hospitalization - Initial Certification    Patient will require inpatient services that will reasonably be expected to span two midnight's based on the clinical documentation in H+P.   Based on patients  current state of illness, I anticipate that, after discharge, patient will require TBD.

## 2024-01-13 NOTE — H&P (VIEW-ONLY)
[facility]  Report of Consultation    Margaret Silverio Patient Status:  Inpatient    3/14/1945 MRN [id number]   Location [facility] 2W/SW Attending Regan Cruz MD   Hosp Day # 0 PCP Johnathon Rodriguez DO     Reason for Consultation:  - Acute on chronic anemia  - GI bleed  - Sepsis     History of Present Illness:  Margaret Silverio is a a(n) 78 year old female who has a history of anemia, arrhythmia, hypertension, seizure disorder, urosepsis with bleeding AVM in 2023 who presents with difficulty breathing, anemia and septic like picture.    The patient reports that she had difficulty breathing, she is noted dark stools for about the last week or so.  She denies any hematemesis.  Admitted with elevated INR greater than 5, thrombocytopenia, progressive anemia.  Since admission last evening she has had only small smears of stool out.  Her hemoglobin has drifted down from 8.6-7.1.  She is on BiPAP and right now but does feel better.  She is speaking in short sentences.    Please see initial consultation from Dr. Mancia from 2023    EGD 2023;  Findings:       1. Esophagus: The squamocolumnar junction was noted at 38 cm and appeared regular. The diaphragmatic pinch was noted noted at 40 cm from the incisors. 2 cm hiatal hernia. The esophageal mucosa appeared healthy and normal. There was no endoscopic findings of esophagitis, stricture or Nielson's esophagus. There was no blood seen in the entire esophagus. Within the hiatal hernia there were no erosions or other lesions visualized.     2. Stomach: The stomach distended normally. Normal rugal folds were seen. The pylorus was patent. Retroflexion revealed a normal fundus. The gastric mucosa appeared healthy and normal. No ulcers or erosions were seen. No blood was seen in the entire stomach.      3. Duodenum: In the duodenal bulb, there was small break in the mucosa that was oozing with water irrigation. The second  portion of the duodenum was unremarkable. There was yellow fluid throughout the examined duodenum. The scope was then brought back to the duodenal bulb and mucosal break was visualized with slow ooze. The surroudning mucosa appeared healthy and normal. One hemoclip was placed and the oozing stopped.      Impression:  -Esophagus: Normal mucosa, small hiatal hernia.   -Stomach: Unremarkable. No source of bleeding seen.  -Duodenum: Small break in the mucosa in the duodenal bulb with active oozing. Possibly an AVM. One hemoclip was placed with cessation in oozing. This is the likely source of melena.    CT scan abdomen /pelvis on adm;  Impression   CONCLUSION:  1. Chronic pancreatitis.  Correlation with pancreatic enzymes recommended.  2. High density material in the gallbladder could be secondary to a previous procedure, or high density sludge/bile.  3. Generalized atherosclerosis with chronically displaced intimal calcification infrarenal aorta.  Coronary artery calcification.  4. Occluded right external iliac artery with reconstitution of a diseased right common femoral artery.  5. Small to moderate loculated right pleural effusion and small left pleural effusion.  Anasarca.  Small amount of ascites.  6. Please see separate chest CT report.       History:  Past Medical History:   Diagnosis Date    Anemia     Arrhythmia     Arthritis     Essential hypertension     High blood pressure     Seizure disorder (HCC)     Seizures (HCC)      Past Surgical History:   Procedure Laterality Date    OTHER SURGICAL HISTORY  2017    Heart Surgery     OTHER SURGICAL HISTORY  2020    Bilateral shoulder replacement      No family history on file.   reports that she has never smoked. She has never used smokeless tobacco. She reports that she does not drink alcohol and does not use drugs.    Allergies:  Allergies   Allergen Reactions    Lisinopril Coughing       Medications:    Current Facility-Administered Medications:     carvedilol  (Coreg) tab 25 mg, 25 mg, Oral, BID    [Held by provider] sacubitril-valsartan (Entresto) 24-26 MG per tab 1 tablet, 1 tablet, Oral, BID    ondansetron (Zofran) 4 MG/2ML injection 4 mg, 4 mg, Intravenous, Q6H PRN    piperacillin-tazobactam (Zosyn) 3.375 g in dextrose 5% 100 mL IVPB-ADDV, 3.375 g, Intravenous, Q8H    pantoprazole (Protonix) 40 mg in sodium chloride 0.9% PF 10 mL IV push, 40 mg, Intravenous, Q12H    potassium chloride 40 mEq in 250mL sodium chloride 0.9% IVPB premix, 40 mEq, Intravenous, Once    sodium chloride 0.9% infusion, , Intravenous, Once    sodium chloride 0.9% infusion, , Intravenous, Continuous    glucose (Glutose) 40% oral gel 15 g, 15 g, Oral, Once    Physical Exam:  Blood pressure 131/73, pulse 60, temperature 97.8 °F (36.6 °C), temperature source Temporal, resp. rate 16, height 5' 5\" (1.651 m), weight 150 lb 5.7 oz (68.2 kg), SpO2 100%.    General: Alert, orientated x3.  Cooperative.  No apparent distress.  Bipap mask in place.   HEENT: Exam is unremarkable.  Neck: Supple.  Lungs: Clear bilaterally.  Cardiac: Regular rate and rhythm.  Abdomen:  Bowel sounds present, normoactive.  Nontender.  Extremities:  No lower extremity edema noted.  Skin: Normal texture and turgor.  Lymphatic:  No cervical lymphadenopathy.    Impression and Plan:  Patient Active Problem List   Diagnosis    Altered mental status    Altered mental status, unspecified altered mental status type    Seizure (HCC)    Lactic acidosis    Leukocytosis    Acute GI bleeding    GEORGE (acute kidney injury) (HCC)    Thrombocytopenia (HCC)    Metabolic acidosis    Atrial fibrillation, chronic (HCC)    Sepsis due to urinary tract infection  (HCC)    Sepsis due to Escherichia coli (HCC)    ABLA (acute blood loss anemia)    Melena    Atrial fibrillation with rapid ventricular response (HCC)    Acute chest pain    Acute on chronic congestive heart failure, unspecified heart failure type (HCC)    Pleural effusion    ACC/AHA stage B  systolic heart failure (HCC)    Congestive heart failure (HCC)    Coronary arteriosclerosis    Essential (primary) hypertension    Mitral valve stenosis    Rheumatoid arthritis (HCC)    Stage 3 chronic kidney disease (HCC)    Atrial flutter (HCC)    Acute on chronic HFrEF (heart failure with reduced ejection fraction) (HCC)    Dyspnea, unspecified type    Hypothermia, initial encounter    Anticoagulated    Coagulopathy (HCC)    SIRS (systemic inflammatory response syndrome) (HCC)     The patient is a 78-year-old female with history of heart disease, heart failure, rheumatoid arthritis, kidney issues, anticoagulation, history of small bowel AVM who was admitted with progressive anemia and melena and possible sepsis.  Patient has pancytopenia and elevated INR.  Her anticoagulation has been held and will monitor.  Plan transfusion today as her hemoglobin has drifted down to 7 but otherwise her blood pressure looks good.    She will need endoscopic evaluation, upper endoscopy will be planned pending optimization of her cardiopulmonary status.  This point with her on BiPAP like to monitor and see how she does after transfusion and then plan EGD based on this.  Agree with PPI, follow hemoglobin and transfuse as needed.  Please follow INR parameters and platelet count-May be suggestive of underlying sepsis-like picture and she does have a history of prior urosepsis.  She has been started on antibiotics.    Check lipase for findings on recent CT scan, possible chronic pancreatitis.      Omar Mckeon MD  1/13/2024  9:20 AM

## 2024-01-13 NOTE — PLAN OF CARE
Patient is A&Ox4, potassium replaced. One bowel movement overnight.   Problem: Patient Centered Care  Goal: Patient preferences are identified and integrated in the patient's plan of care  Description: Interventions:  - What would you like us to know as we care for you?   - Provide timely, complete, and accurate information to patient/family  - Incorporate patient and family knowledge, values, beliefs, and cultural backgrounds into the planning and delivery of care  - Encourage patient/family to participate in care and decision-making at the level they choose  - Honor patient and family perspectives and choices  Outcome: Progressing     Problem: Patient/Family Goals  Goal: Patient/Family Long Term Goal  Description: Patient's Long Term Goal: to go home    Interventions:  - follow MD orders  - See additional Care Plan goals for specific interventions  Outcome: Progressing  Goal: Patient/Family Short Term Goal  Description: Patient's Short Term Goal:     Interventions:   -   - See additional Care Plan goals for specific interventions  Outcome: Progressing     Problem: RISK FOR INFECTION - ADULT  Goal: Absence of fever/infection during anticipated neutropenic period  Description: INTERVENTIONS  - Monitor WBC  - Administer growth factors as ordered  - Implement neutropenic guidelines  Outcome: Progressing     Problem: SAFETY ADULT - FALL  Goal: Free from fall injury  Description: INTERVENTIONS:  - Assess pt frequently for physical needs  - Identify cognitive and physical deficits and behaviors that affect risk of falls.  - [place] fall precautions as indicated by assessment.  - Educate pt/family on patient safety including physical limitations  - Instruct pt to call for assistance with activity based on assessment  - Modify environment to reduce risk of injury  - Provide assistive devices as appropriate  - Consider OT/PT consult to assist with strengthening/mobility  - Encourage toileting schedule  Outcome:  Progressing     Problem: DISCHARGE PLANNING  Goal: Discharge to home or other facility with appropriate resources  Description: INTERVENTIONS:  - Identify barriers to discharge w/pt and caregiver  - Include patient/family/discharge partner in discharge planning  - Arrange for needed discharge resources and transportation as appropriate  - Identify discharge learning needs (meds, wound care, etc)  - Arrange for interpreters to assist at discharge as needed  - Consider post-discharge preferences of patient/family/discharge partner  - Complete POLST form as appropriate  - Assess patient's ability to be responsible for managing their own health  - Refer to Case Management Department for coordinating discharge planning if the patient needs post-hospital services based on physician/LIP order or complex needs related to functional status, cognitive ability or social support system  Outcome: Progressing     Problem: RESPIRATORY - ADULT  Goal: Achieves optimal ventilation and oxygenation  Description: INTERVENTIONS:  - Assess for changes in respiratory status  - Assess for changes in mentation and behavior  - Position to facilitate oxygenation and minimize respiratory effort  - Oxygen supplementation based on oxygen saturation or ABGs  - Provide Smoking Cessation handout, if applicable  - Encourage broncho-pulmonary hygiene including cough, deep breathe, Incentive Spirometry  - Assess the need for suctioning and perform as needed  - Assess and instruct to report SOB or any respiratory difficulty  - Respiratory Therapy support as indicated  - Manage/alleviate anxiety  - Monitor for signs/symptoms of CO2 retention  Outcome: Progressing

## 2024-01-13 NOTE — H&P
[facility]    History & Physical    Margaret Silverio Patient Status:  Emergency    3/14/1945 MRN [id number]   Location [facility] EMERGENCY DEPARTMENT Attending Merritt Queen MD   Hosp Day # 0 PCP Johnathon Rodriguez DO     Date:  2024  Date of Admission:  2024    Chief Complaint:  Chief Complaint   Patient presents with    Difficulty Breathing       History of Present Illness:  Margaret Silverio is a(n) 78 year old female, with a past medical history significant for atrial fibrillation, currently on Eliquis, hypertension, chronic systolic heart failure and status post pacemaker placement earlier in September was brought to the ER with increasing shortness of breath fatigue and melanotic stools over the last week.  Some mention of abdominal pain earlier in the day.  Patient currently on BiPAP, difficult to understand however denying any pain, claims her breathing is much improved.  No nausea vomiting or chest pain    History:  Past Medical History:   Diagnosis Date    Anemia     Arrhythmia     Arthritis     Essential hypertension     High blood pressure     Seizure disorder (HCC)     Seizures (HCC)      Past Surgical History:   Procedure Laterality Date    OTHER SURGICAL HISTORY  2017    Heart Surgery     OTHER SURGICAL HISTORY  2020    Bilateral shoulder replacement      Family history: No sick contacts   reports that she has never smoked. She has never used smokeless tobacco. She reports that she does not drink alcohol and does not use drugs.    Allergies:  Allergies   Allergen Reactions    Lisinopril Coughing       Home Medications:  Prior to Admission Medications   Prescriptions Last Dose Informant Patient Reported? Taking?   AMITRIPTYLINE 25 MG Oral Tab   No No   Sig: TAKE 1 TABLET(25 MG) BY MOUTH EVERY NIGHT   ENTRESTO 24-26 MG Oral Tab   No No   Sig: Take 1 tablet by mouth 2 (two) times daily.   HYDROcodone-acetaminophen (NORCO)  MG Oral Tab   No No   Sig:  Take 1 tablet by mouth every 6 (six) hours as needed for Pain.   HYDROcodone-acetaminophen  MG Oral Tab   No No   Sig: Take 1 tablet by mouth every 8 (eight) hours as needed for Pain.   alendronate 70 MG Oral Tab   No No   Sig: Take 1 tablet (70 mg total) by mouth once a week.   apixaban 5 MG Oral Tab   Yes No   Sig: Take 1 tablet (5 mg total) by mouth 2 (two) times daily.   carvedilol 25 MG Oral Tab   No No   Sig: Take 1 tablet (25 mg total) by mouth 2 (two) times daily.   ferrous sulfate 325 (65 FE) MG Oral Tab EC   Yes No   Sig: Take 1 tablet (325 mg total) by mouth daily with breakfast.   folic acid 800 MCG Oral Tab   No No   Sig: Take 1 tablet (800 mcg total) by mouth daily.   furosemide 20 MG Oral Tab   No No   Sig: Take 0.5 tablets (10 mg total) by mouth daily.   methotrexate 2.5 MG Oral Tab   No No   Sig: TAKE 6 TABLETS BY MOUTH 1 TIME A WEEK   pantoprazole 40 MG Oral Tab EC   No No   Sig: Take 1 tablet (40 mg total) by mouth daily.   pregabalin 75 MG Oral Cap   No No   Sig: Take 1 capsule (75 mg total) by mouth 3 (three) times daily.      Facility-Administered Medications: None       Review of Systems:  Constitutional:  Weakness, Fatigue.  Eye:  Negative.  Ear/Nose/Mouth/Throat:  Negative.  Respiratory: Shortness of breath  Cardiovascular: Negative  Gastrointestinal: Dark watery stool  Genitourinary:  Negative  Endocrine:  Negative.  Immunologic:  Negative.  Musculoskeletal:  Negative.  Integumentary:  Negative.  Neurologic:  Negative.  Psychiatric:  Negative.  ROS reviewed as documented in chart    Physical Exam:  Temp:  [95.7 °F (35.4 °C)] 95.7 °F (35.4 °C)  Pulse:  [59-64] 59  Resp:  [22-28] 24  BP: (132-142)/() 132/77  SpO2:  [96 %-100 %] 100 %    General:  Alert and oriented.  Diffuse skin problem:  None.  Eye:  Pupils are equal, round and reactive to light, extraocular movements are intact, Normal conjunctiva.  HENT:  Normocephalic, oral mucosa is moist.  Head:  Normocephalic,  atraumatic.  Neck:  Supple, non-tender, no carotid bruit, no jugular venous distention, no lymphadenopathy, no thyromegaly.  Respiratory: Decreased breath sounds right base respirations are non-labored, symmetrical chest wall expansion.  Cardiovascular:  Normal rate, regular rhythm, no murmur, no edema.  Gastrointestinal:  Soft, non-tender, non-distended, normal bowel sounds, no organomegaly.  Lymphatics:  No lymphadenopathy neck, axilla, groin.  Musculoskeletal: Normal range of motion.  normal strength.  Feet:  Normal pulses.  Neurologic:  Alert, oriented, no focal deficits, cranial nerves II-XII are grossly intact.  Cognition and Speech: Difficult to understand at this time  Psychiatric:  Cooperative, appropriate mood & affect.      Laboratory Data:   Lab Results   Component Value Date    WBC 2.9 01/12/2024    HGB 8.6 01/12/2024    HCT 28.9 01/12/2024    CREATSERUM 1.57 01/12/2024    BUN 27 01/12/2024     01/12/2024    K 5.0 01/12/2024     01/12/2024    CO2 15.0 01/12/2024    GLU 47 01/12/2024    CA 9.0 01/12/2024    ALB 3.6 01/12/2024    ALKPHO 135 01/12/2024    BILT 2.2 01/12/2024    TP 6.8 01/12/2024    AST 38 01/12/2024    ALT 17 01/12/2024    PTT 44.1 01/12/2024    INR 5.24 01/12/2024    TSH 19.180 01/12/2024    MG 2.0 01/12/2024    PHOS 4.0 01/12/2024       Imaging:  No results found.     Assessment and Plan:    GI bleed  GI consulted, patient started Protonix 40 mg IV twice daily.  Eliquis on hold for now.    Acute blood loss anemia  Secondary to GI loss, monitor closely will transfuse if hemoglobin less than 7 or acute bleeding.  Repeat H&H in 6 hours.  GI has been consulted    Acute respiratory failure with hypoxia  Unclear etiology at this time, CT scan suggestive of pleural effusion, pulmonary consulted.  Currently on BiPAP, will wean as tolerated.    Sepsis with lactic acidosis  Unclear source of sepsis at this time, patient started on Zosyn 3.375 g IV piggyback every 8 hours, cultures  pending, lactic acidosis could be blood loss related, IV fluids initiated, monitor closely, continue serial lactate levels.    GEORGE with possible ATN  Likely sepsis related, as stated previously IV fluids initiated, monitor I's and O's.  Avoid nephrotoxic medications.    Atrial fibrillation  Rate controlled, hold Eliquis for now considering GI bleed    Chronic combined heart failure  Currently no signs of fluid overload, initiate IV fluids, will monitor I's and O's and daily weights.    Prophylaxis  SCDs, heparin Eliquis on hold considering GI bleed    CODE STATUS  Full    Primary care physician  Johnathon Rodriguez DO    MDM: High, severe exacerbation of chronic illness posing threat to life.  IV medications requiring close inpatient monitoring  75 minutes spent on this admission - examining patient, obtaining history, reviewing previous medical records, going over test results/imaging and discussing plan of care. All questions answered.     Disposition  Clinical course will dictate outcome      ISAIAH GERMAN MD  1/12/2024  10:01 PM

## 2024-01-13 NOTE — CONSULTS
[facility]  Report of Consultation    Margaret Silverio Patient Status:  Inpatient    3/14/1945 MRN [id number]   Location [facility] 2W/SW Attending Regan Cruz MD   Hosp Day # 0 PCP Johnathon Rodriguez DO     Reason for Consultation:  - Acute on chronic anemia  - GI bleed  - Sepsis     History of Present Illness:  Margaret Silverio is a a(n) 78 year old female who has a history of anemia, arrhythmia, hypertension, seizure disorder, urosepsis with bleeding AVM in 2023 who presents with difficulty breathing, anemia and septic like picture.    The patient reports that she had difficulty breathing, she is noted dark stools for about the last week or so.  She denies any hematemesis.  Admitted with elevated INR greater than 5, thrombocytopenia, progressive anemia.  Since admission last evening she has had only small smears of stool out.  Her hemoglobin has drifted down from 8.6-7.1.  She is on BiPAP and right now but does feel better.  She is speaking in short sentences.    Please see initial consultation from Dr. Mancia from 2023    EGD 2023;  Findings:       1. Esophagus: The squamocolumnar junction was noted at 38 cm and appeared regular. The diaphragmatic pinch was noted noted at 40 cm from the incisors. 2 cm hiatal hernia. The esophageal mucosa appeared healthy and normal. There was no endoscopic findings of esophagitis, stricture or Nielson's esophagus. There was no blood seen in the entire esophagus. Within the hiatal hernia there were no erosions or other lesions visualized.     2. Stomach: The stomach distended normally. Normal rugal folds were seen. The pylorus was patent. Retroflexion revealed a normal fundus. The gastric mucosa appeared healthy and normal. No ulcers or erosions were seen. No blood was seen in the entire stomach.      3. Duodenum: In the duodenal bulb, there was small break in the mucosa that was oozing with water irrigation. The second  portion of the duodenum was unremarkable. There was yellow fluid throughout the examined duodenum. The scope was then brought back to the duodenal bulb and mucosal break was visualized with slow ooze. The surroudning mucosa appeared healthy and normal. One hemoclip was placed and the oozing stopped.      Impression:  -Esophagus: Normal mucosa, small hiatal hernia.   -Stomach: Unremarkable. No source of bleeding seen.  -Duodenum: Small break in the mucosa in the duodenal bulb with active oozing. Possibly an AVM. One hemoclip was placed with cessation in oozing. This is the likely source of melena.    CT scan abdomen /pelvis on adm;  Impression   CONCLUSION:  1. Chronic pancreatitis.  Correlation with pancreatic enzymes recommended.  2. High density material in the gallbladder could be secondary to a previous procedure, or high density sludge/bile.  3. Generalized atherosclerosis with chronically displaced intimal calcification infrarenal aorta.  Coronary artery calcification.  4. Occluded right external iliac artery with reconstitution of a diseased right common femoral artery.  5. Small to moderate loculated right pleural effusion and small left pleural effusion.  Anasarca.  Small amount of ascites.  6. Please see separate chest CT report.       History:  Past Medical History:   Diagnosis Date    Anemia     Arrhythmia     Arthritis     Essential hypertension     High blood pressure     Seizure disorder (HCC)     Seizures (HCC)      Past Surgical History:   Procedure Laterality Date    OTHER SURGICAL HISTORY  2017    Heart Surgery     OTHER SURGICAL HISTORY  2020    Bilateral shoulder replacement      No family history on file.   reports that she has never smoked. She has never used smokeless tobacco. She reports that she does not drink alcohol and does not use drugs.    Allergies:  Allergies   Allergen Reactions    Lisinopril Coughing       Medications:    Current Facility-Administered Medications:     carvedilol  (Coreg) tab 25 mg, 25 mg, Oral, BID    [Held by provider] sacubitril-valsartan (Entresto) 24-26 MG per tab 1 tablet, 1 tablet, Oral, BID    ondansetron (Zofran) 4 MG/2ML injection 4 mg, 4 mg, Intravenous, Q6H PRN    piperacillin-tazobactam (Zosyn) 3.375 g in dextrose 5% 100 mL IVPB-ADDV, 3.375 g, Intravenous, Q8H    pantoprazole (Protonix) 40 mg in sodium chloride 0.9% PF 10 mL IV push, 40 mg, Intravenous, Q12H    potassium chloride 40 mEq in 250mL sodium chloride 0.9% IVPB premix, 40 mEq, Intravenous, Once    sodium chloride 0.9% infusion, , Intravenous, Once    sodium chloride 0.9% infusion, , Intravenous, Continuous    glucose (Glutose) 40% oral gel 15 g, 15 g, Oral, Once    Physical Exam:  Blood pressure 131/73, pulse 60, temperature 97.8 °F (36.6 °C), temperature source Temporal, resp. rate 16, height 5' 5\" (1.651 m), weight 150 lb 5.7 oz (68.2 kg), SpO2 100%.    General: Alert, orientated x3.  Cooperative.  No apparent distress.  Bipap mask in place.   HEENT: Exam is unremarkable.  Neck: Supple.  Lungs: Clear bilaterally.  Cardiac: Regular rate and rhythm.  Abdomen:  Bowel sounds present, normoactive.  Nontender.  Extremities:  No lower extremity edema noted.  Skin: Normal texture and turgor.  Lymphatic:  No cervical lymphadenopathy.    Impression and Plan:  Patient Active Problem List   Diagnosis    Altered mental status    Altered mental status, unspecified altered mental status type    Seizure (HCC)    Lactic acidosis    Leukocytosis    Acute GI bleeding    GEORGE (acute kidney injury) (HCC)    Thrombocytopenia (HCC)    Metabolic acidosis    Atrial fibrillation, chronic (HCC)    Sepsis due to urinary tract infection  (HCC)    Sepsis due to Escherichia coli (HCC)    ABLA (acute blood loss anemia)    Melena    Atrial fibrillation with rapid ventricular response (HCC)    Acute chest pain    Acute on chronic congestive heart failure, unspecified heart failure type (HCC)    Pleural effusion    ACC/AHA stage B  systolic heart failure (HCC)    Congestive heart failure (HCC)    Coronary arteriosclerosis    Essential (primary) hypertension    Mitral valve stenosis    Rheumatoid arthritis (HCC)    Stage 3 chronic kidney disease (HCC)    Atrial flutter (HCC)    Acute on chronic HFrEF (heart failure with reduced ejection fraction) (HCC)    Dyspnea, unspecified type    Hypothermia, initial encounter    Anticoagulated    Coagulopathy (HCC)    SIRS (systemic inflammatory response syndrome) (HCC)     The patient is a 78-year-old female with history of heart disease, heart failure, rheumatoid arthritis, kidney issues, anticoagulation, history of small bowel AVM who was admitted with progressive anemia and melena and possible sepsis.  Patient has pancytopenia and elevated INR.  Her anticoagulation has been held and will monitor.  Plan transfusion today as her hemoglobin has drifted down to 7 but otherwise her blood pressure looks good.    She will need endoscopic evaluation, upper endoscopy will be planned pending optimization of her cardiopulmonary status.  This point with her on BiPAP like to monitor and see how she does after transfusion and then plan EGD based on this.  Agree with PPI, follow hemoglobin and transfuse as needed.  Please follow INR parameters and platelet count-May be suggestive of underlying sepsis-like picture and she does have a history of prior urosepsis.  She has been started on antibiotics.    Check lipase for findings on recent CT scan, possible chronic pancreatitis.      Omar Mckeon MD  1/13/2024  9:20 AM

## 2024-01-14 ENCOUNTER — APPOINTMENT (OUTPATIENT)
Dept: ULTRASOUND IMAGING | Facility: HOSPITAL | Age: 79
End: 2024-01-14
Attending: HOSPITALIST
Payer: MEDICARE

## 2024-01-14 ENCOUNTER — APPOINTMENT (OUTPATIENT)
Dept: GENERAL RADIOLOGY | Facility: HOSPITAL | Age: 79
End: 2024-01-14
Attending: HOSPITALIST
Payer: MEDICARE

## 2024-01-14 ENCOUNTER — APPOINTMENT (OUTPATIENT)
Dept: CV DIAGNOSTICS | Facility: HOSPITAL | Age: 79
End: 2024-01-14
Attending: HOSPITALIST
Payer: MEDICARE

## 2024-01-14 PROBLEM — D50.0 ANEMIA DUE TO GI BLOOD LOSS: Status: ACTIVE | Noted: 2024-01-01

## 2024-01-14 PROBLEM — D63.8 ANEMIA OF CHRONIC DISEASE: Status: ACTIVE | Noted: 2024-01-14

## 2024-01-14 PROBLEM — D50.0 ANEMIA DUE TO GI BLOOD LOSS: Status: ACTIVE | Noted: 2024-01-14

## 2024-01-14 PROBLEM — D63.1 ANEMIA DUE TO STAGE 3 CHRONIC KIDNEY DISEASE  (HCC): Status: ACTIVE | Noted: 2024-01-14

## 2024-01-14 PROBLEM — N18.30 ANEMIA DUE TO STAGE 3 CHRONIC KIDNEY DISEASE (HCC): Status: ACTIVE | Noted: 2024-01-14

## 2024-01-14 PROBLEM — N18.30 ANEMIA DUE TO STAGE 3 CHRONIC KIDNEY DISEASE (HCC): Status: ACTIVE | Noted: 2024-01-01

## 2024-01-14 PROBLEM — N18.30 ANEMIA DUE TO STAGE 3 CHRONIC KIDNEY DISEASE  (HCC): Status: ACTIVE | Noted: 2024-01-14

## 2024-01-14 PROBLEM — D63.8 ANEMIA OF CHRONIC DISEASE: Status: ACTIVE | Noted: 2024-01-01

## 2024-01-14 PROBLEM — D63.1 ANEMIA DUE TO STAGE 3 CHRONIC KIDNEY DISEASE (HCC): Status: ACTIVE | Noted: 2024-01-14

## 2024-01-14 PROBLEM — D63.1 ANEMIA DUE TO STAGE 3 CHRONIC KIDNEY DISEASE (HCC): Status: ACTIVE | Noted: 2024-01-01

## 2024-01-14 LAB
ALBUMIN SERPL-MCNC: 3.2 G/DL (ref 3.2–4.8)
ANION GAP SERPL CALC-SCNC: 8 MMOL/L (ref 0–18)
BLOOD TYPE BARCODE: 8400
BUN BLD-MCNC: 20 MG/DL (ref 9–23)
BUN/CREAT SERPL: 15.9 (ref 10–20)
CALCIUM BLD-MCNC: 8.5 MG/DL (ref 8.7–10.4)
CHLORIDE SERPL-SCNC: 106 MMOL/L (ref 98–112)
CO2 SERPL-SCNC: 26 MMOL/L (ref 21–32)
CREAT BLD-MCNC: 1.26 MG/DL
DEPRECATED RDW RBC AUTO: 59.3 FL (ref 35.1–46.3)
EGFRCR SERPLBLD CKD-EPI 2021: 44 ML/MIN/1.73M2 (ref 60–?)
ERYTHROCYTE [DISTWIDTH] IN BLOOD BY AUTOMATED COUNT: 17.9 % (ref 11–15)
FIBRINOGEN PPP-MCNC: 311 MG/DL (ref 180–480)
FOLATE SERPL-MCNC: 16.5 NG/ML (ref 5.4–?)
GLUCOSE BLD-MCNC: 75 MG/DL (ref 70–99)
GLUCOSE BLDC GLUCOMTR-MCNC: 89 MG/DL (ref 70–99)
HAPTOGLOB SERPL-MCNC: <7.7 MG/DL (ref 40–280)
HCT VFR BLD AUTO: 27.5 %
HGB BLD-MCNC: 8.9 G/DL
HGB RETIC QN AUTO: 32.2 PG (ref 28.2–36.6)
IMM RETICS NFR: 0.18 RATIO (ref 0.1–0.3)
INR BLD: 2.6 (ref 0.8–1.2)
LACTATE SERPL-SCNC: 1.6 MMOL/L (ref 0.5–2)
LDH SERPL L TO P-CCNC: 339 U/L
MCH RBC QN AUTO: 30.1 PG (ref 26–34)
MCHC RBC AUTO-ENTMCNC: 32.4 G/DL (ref 31–37)
MCV RBC AUTO: 92.9 FL
OSMOLALITY SERPL CALC.SUM OF ELEC: 291 MOSM/KG (ref 275–295)
PHOSPHATE SERPL-MCNC: 2 MG/DL (ref 2.4–5.1)
PLATELET # BLD AUTO: 35 10(3)UL (ref 150–450)
POTASSIUM SERPL-SCNC: 3.4 MMOL/L (ref 3.5–5.1)
POTASSIUM SERPL-SCNC: 3.4 MMOL/L (ref 3.5–5.1)
POTASSIUM SERPL-SCNC: 3.9 MMOL/L (ref 3.5–5.1)
PROTHROMBIN TIME: 29.4 SECONDS (ref 11.6–14.8)
RBC # BLD AUTO: 2.96 X10(6)UL
RETICS # AUTO: 13.5 X10(3) UL (ref 22.5–147.5)
RETICS/RBC NFR AUTO: 0.5 %
SODIUM SERPL-SCNC: 140 MMOL/L (ref 136–145)
UNIT VOLUME: 284 ML
VIT B12 SERPL-MCNC: >2000 PG/ML (ref 211–911)
VIT B12 SERPL-MCNC: >2000 PG/ML (ref 211–911)
WBC # BLD AUTO: 3.1 X10(3) UL (ref 4–11)

## 2024-01-14 PROCEDURE — 99233 SBSQ HOSP IP/OBS HIGH 50: CPT | Performed by: INTERNAL MEDICINE

## 2024-01-14 PROCEDURE — 93306 TTE W/DOPPLER COMPLETE: CPT | Performed by: HOSPITALIST

## 2024-01-14 PROCEDURE — 99223 1ST HOSP IP/OBS HIGH 75: CPT | Performed by: INTERNAL MEDICINE

## 2024-01-14 PROCEDURE — 71045 X-RAY EXAM CHEST 1 VIEW: CPT | Performed by: HOSPITALIST

## 2024-01-14 PROCEDURE — 93971 EXTREMITY STUDY: CPT | Performed by: HOSPITALIST

## 2024-01-14 PROCEDURE — 99233 SBSQ HOSP IP/OBS HIGH 50: CPT | Performed by: HOSPITALIST

## 2024-01-14 RX ORDER — FUROSEMIDE 10 MG/ML
20 INJECTION INTRAMUSCULAR; INTRAVENOUS
Status: DISCONTINUED | OUTPATIENT
Start: 2024-01-14 | End: 2024-01-18

## 2024-01-14 RX ORDER — AMITRIPTYLINE HYDROCHLORIDE 25 MG/1
25 TABLET, FILM COATED ORAL NIGHTLY
Status: DISCONTINUED | OUTPATIENT
Start: 2024-01-14 | End: 2024-01-20

## 2024-01-14 RX ORDER — POTASSIUM CHLORIDE 14.9 MG/ML
20 INJECTION INTRAVENOUS ONCE
Status: COMPLETED | OUTPATIENT
Start: 2024-01-14 | End: 2024-01-14

## 2024-01-14 NOTE — PLAN OF CARE
Problem: Patient Centered Care  Goal: Patient preferences are identified and integrated in the patient's plan of care  Description: Interventions:  - What would you like us to know as we care for you?   - Provide timely, complete, and accurate information to patient/family  - Incorporate patient and family knowledge, values, beliefs, and cultural backgrounds into the planning and delivery of care  - Encourage patient/family to participate in care and decision-making at the level they choose  - Honor patient and family perspectives and choices  Outcome: Progressing     Problem: RISK FOR INFECTION - ADULT  Goal: Absence of fever/infection during anticipated neutropenic period  Description: INTERVENTIONS  - Monitor WBC  - Administer growth factors as ordered  - Implement neutropenic guidelines  Outcome: Progressing     Problem: SAFETY ADULT - FALL  Goal: Free from fall injury  Description: INTERVENTIONS:  - Assess pt frequently for physical needs  - Identify cognitive and physical deficits and behaviors that affect risk of falls.  - [place] fall precautions as indicated by assessment.  - Educate pt/family on patient safety including physical limitations  - Instruct pt to call for assistance with activity based on assessment  - Modify environment to reduce risk of injury  - Provide assistive devices as appropriate  - Consider OT/PT consult to assist with strengthening/mobility  - Encourage toileting schedule  Outcome: Progressing     Problem: DISCHARGE PLANNING  Goal: Discharge to home or other facility with appropriate resources  Description: INTERVENTIONS:  - Identify barriers to discharge w/pt and caregiver  - Include patient/family/discharge partner in discharge planning  - Arrange for needed discharge resources and transportation as appropriate  - Identify discharge learning needs (meds, wound care, etc)  - Arrange for interpreters to assist at discharge as needed  - Consider post-discharge preferences of  patient/family/discharge partner  - Complete POLST form as appropriate  - Assess patient's ability to be responsible for managing their own health  - Refer to Case Management Department for coordinating discharge planning if the patient needs post-hospital services based on physician/LIP order or complex needs related to functional status, cognitive ability or social support system  Outcome: Progressing     Problem: RESPIRATORY - ADULT  Goal: Achieves optimal ventilation and oxygenation  Description: INTERVENTIONS:  - Assess for changes in respiratory status  - Assess for changes in mentation and behavior  - Position to facilitate oxygenation and minimize respiratory effort  - Oxygen supplementation based on oxygen saturation or ABGs  - Provide Smoking Cessation handout, if applicable  - Encourage broncho-pulmonary hygiene including cough, deep breathe, Incentive Spirometry  - Assess the need for suctioning and perform as needed  - Assess and instruct to report SOB or any respiratory difficulty  - Respiratory Therapy support as indicated  - Manage/alleviate anxiety  - Monitor for signs/symptoms of CO2 retention  Outcome: Progressing     Problem: HEMATOLOGIC - ADULT  Goal: Maintains hematologic stability  Description: INTERVENTIONS  - Assess for signs and symptoms of bleeding or hemorrhage  - Monitor labs and vital signs for trends  - Administer supportive blood products/factors, fluids and medications as ordered and appropriate  - Administer supportive blood products/factors as ordered and appropriate  Outcome: Progressing  Goal: Free from bleeding injury  Description: (Example usage: patient with low platelets)  INTERVENTIONS:  - Avoid intramuscular injections, enemas and rectal medication administration  - Ensure safe mobilization of patient  - Hold pressure on venipuncture sites to achieve adequate hemostasis  - Assess for signs and symptoms of internal bleeding  - Monitor lab trends  - Patient is to report  abnormal signs of bleeding to staff  - Avoid use of toothpicks and dental floss  - Use electric shaver for shaving  - Use soft bristle tooth brush  - Limit straining and forceful nose blowing  Outcome: Progressing     Problem: GASTROINTESTINAL - ADULT  Goal: Maintains or returns to baseline bowel function  Description: INTERVENTIONS:  - Assess bowel function  - Maintain adequate hydration with IV or PO as ordered and tolerated  - Evaluate effectiveness of GI medications  - Encourage mobilization and activity  - Obtain nutritional consult as needed  - Establish a toileting routine/schedule  - Consider collaborating with pharmacy to review patient's medication profile  Outcome: Progressing

## 2024-01-14 NOTE — PROGRESS NOTES
[facility]    Progress Note    Margaret Silverio Patient Status:  Inpatient    3/14/1945 MRN [id number]   Location [facility] 2W/SW Attending Regan Cruz MD   Hosp Day # 1 PCP Johnathon Rodriguez DO     Chief Complaint:   Chief Complaint   Patient presents with    Difficulty Breathing   Diarrhea, black stools.     Subjective:   Margaret Silverio is feeling well. No BM since admission. No SOB. Still has dry cough. Her IV infiltrated this morning in LUE and has pain there and swelling     Per RN no events overnight was weaned off o2. No BM's        Objective:   Objective:    Blood pressure 124/77, pulse 58, temperature 97.1 °F (36.2 °C), temperature source Temporal, resp. rate 14, height 5' 5\" (1.651 m), weight 150 lb 5.7 oz (68.2 kg), SpO2 99%.    Physical Exam:    General: No acute distress.   Respiratory: Clear to auscultation bilaterally. No wheezes. No rhonchi.  Cardiovascular: S1, S2. Regular rate and rhythm. No rubs or gallops. + 2/6 Systolic M LLSB  Abdomen: Soft, nontender, nondistended.  Positive bowel sounds. No rebound or guarding.  Neurologic: No focal neurological deficits.   Musculoskeletal: Moves all extremities.  Extremities: LUE edema and tenderness near prev IV site      Results:   Results:    Labs:  Recent Labs   Lab 24  2100 24  2100 24  0242 24  0536 24  0818 24  1358 24  1821 24  0412   WBC  --  2.9*  --   --  3.0*  --   --  3.9* 3.1*   HGB  --  8.6*   < > 7.3* 7.1*  --  8.5* 9.3* 8.9*   MCV  --  101.4*  --   --  95.3  --   --  90.1 92.9   PLT  --   --   --   --  38.0*  --   --  26.0* 35.0*   INR 5.24*  --   --   --   --  5.10*  --  3.63* 2.60*    < > = values in this interval not displayed.       Recent Labs   Lab 24  0536 24  0412   GLU 47* 200* 75   BUN 27* 26* 20   CREATSERUM 1.57* 1.32* 1.26*   CA 9.0 8.2* 8.5*   ALB 3.6 3.0* 3.2   * 139 140   K 5.0 3.4* 3.4*   3.4*    103 106   CO2 15.0* 27.0 26.0   ALKPHO 135 110  --    AST 38* 38*  --    ALT 17 15  --    BILT 2.2* 1.6*  --    TP 6.8 5.6*  --        Estimated Creatinine Clearance: 33.6 mL/min (A) (based on SCr of 1.26 mg/dL (H)).    Recent Labs   Lab 01/13/24  0818 01/13/24  1821 01/14/24  0412   PTP 50.3* 38.4* 29.4*   INR 5.10* 3.63* 2.60*              Culture:  Hospital Encounter on 01/12/24   1. Blood Culture     Status: None (Preliminary result)    Collection Time: 01/12/24  9:33 PM    Specimen: Blood,peripheral   Result Value Ref Range    Blood Culture Result No Growth 1 Day N/A   2. Urine Culture, Routine     Status: None    Collection Time: 01/12/24  8:53 PM    Specimen: Urine, clean catch   Result Value Ref Range    Urine Culture No Growth at 18-24 hrs. N/A       Cardiac  No results for input(s): \"TROP\", \"PBNP\" in the last 168 hours.      Imaging: Imaging data reviewed in [facility].  XR CHEST AP PORTABLE  (CPT=71045)    Result Date: 1/14/2024  CONCLUSION:   Mild interstitial edema is unchanged.  Small right pleural effusion with right lower lobe airspace opacity which may represent atelectasis or pneumonia is also unchanged.    Dictated by (CST): Kane Garcia MD on 1/14/2024 at 7:58 AM     Finalized by (CST): Kane Garcia MD on 1/14/2024 at 8:00 AM          XR CHEST AP PORTABLE  (CPT=71045)    Result Date: 1/13/2024  CONCLUSION:  1.  Findings compatible with CHF.  Cardiomegaly, mitral prosthesis and pacing device/ICD.  Vascular congestion, and right pleural effusion. 2.  Post bilateral shoulder arthroplasties. 3.  Right basal pulmonary opacity which could represent secondary compressive atelectasis however correlate for pneumonia.   Dictated by (CST): Maxx Enriquez MD on 1/13/2024 at 2:20 PM     Finalized by (CST): Maxx Enriquez MD on 1/13/2024 at 2:24 PM          CT CHEST PE AORTA (IV ONLY) (CPT=71260)    Result Date: 1/13/2024  CONCLUSION:  1. No acute pulmonary embolus through segmental level. 2. Small to  moderate loculated right pleural effusion and small left pleural effusion. 3. Mitral valve prosthesis. 4. Marked coronary artery calcification. 5. Biatrial enlargement.  Reflux of contrast from right atrium into dilated IVC and hepatic veins reflecting elevated right heart pressure. 6. Mild emphysema. 7. Small amount of ascites.    Dictated by (CST): Jesus Jackson MD on 1/13/2024 at 6:44 AM     Finalized by (CST): Jesus Jackson MD on 1/13/2024 at 6:56 AM          CT ABDOMEN PELVIS IV CONTRAST, NO ORAL (ER)    Result Date: 1/13/2024  CONCLUSION:  1. Chronic pancreatitis.  Correlation with pancreatic enzymes recommended. 2. High density material in the gallbladder could be secondary to a previous procedure, or high density sludge/bile. 3. Generalized atherosclerosis with chronically displaced intimal calcification infrarenal aorta.  Coronary artery calcification. 4. Occluded right external iliac artery with reconstitution of a diseased right common femoral artery. 5. Small to moderate loculated right pleural effusion and small left pleural effusion.  Anasarca.  Small amount of ascites. 6. Please see separate chest CT report.    Dictated by (CST): Jesus Jackson MD on 1/13/2024 at 6:19 AM     Finalized by (CST): Jesus Jackson MD on 1/13/2024 at 6:36 AM          XR CHEST AP PORTABLE  (CPT=71045)    Result Date: 1/12/2024  CONCLUSION:   Increased opacity in the right mid to lower lung which may reflect combination of pleural effusion which may be partially loculated and parenchymal opacity which could reflect atelectasis or pneumonia.  Trace suspected left pleural effusion with mild left basilar opacity which also could reflect atelectasis or pneumonia.    Dictated by (CST): Azar Mendez MD on 1/12/2024 at 10:06 PM     Finalized by (CST): Azar Mendez MD on 1/12/2024 at 10:07 PM           Medications:    potassium phosphate dibasic 15 mmol in sodium chloride 0.9% 250 mL IVPB  15 mmol Intravenous Once     Followed by    potassium chloride  20 mEq Intravenous Once    furosemide  20 mg Intravenous BID (Diuretic)    carvedilol  25 mg Oral BID    sacubitril-valsartan  1 tablet Oral BID    piperacillin-tazobactam  3.375 g Intravenous Q8H    pantoprazole  40 mg Intravenous Q12H    glucose  15 g Oral Once         Assessment and Plan:   Assessment & Plan:      Acute Upper GI bleed   - Patient with 1 week of diarrhea and melanotic stools.   - Hb 7.1 lowest. Trending up now   - GI on consult.   - hold Eliquis.   - transfuse for Hb < 7.0 or PLT < 10K or < 50K with active bleeding   - s/p FFP 1 unit and 1 unit PRBC   - h/o EGD 04/23' with AVM s/p hemoclip   - Eventually will need endoscopic evaluation timing per GI   - IV PPI BID      Acute blood loss anemia  - baseline Hb 9-10 per EMR   - iron panel ok.   - tranfused 1 unit PRBC   - stop IVF  - H/H stable   - start IV venofer     LUE edema  - elevate. Hot packs   - US to r/o DVT.   - IV infiltrated overnight 1/13     Acute hypoxic respiratory failure   Secondary to CHF and RLL PNA   - off NIPPV. Off o2 NC   - Pulmonary on consult.   - CXR with cardiomegaly and vascular congestion, R pleural effusion and R basal pulm opacity ? PNA  - Lasix 20mg IV BID   - IV zosyn empirically   - blood cx 2/2 NGTD. Genexpert neg,     Pancytopenia   - Likely related to severe sepsis on admit from PNA   - B12 ok   - WBC stable, Hb stable after PRBC, PLT same   - PRBC for Hb < 7.0 and PLT for PLT < 10K   - Monitor   - [facility] consult     Severe sepsis   - secondary to PNA   - Lactic acid 12 on admit --> 4.8 will rpt   - blood cx x 2 neg   - CXR possible RLL PNA   - IV zosyn   - CT chest small loculated R and small L pleural effusion no consolidation. CT abd pelvis chronic pancreatitis   - C.diff and GI panel pending   - Ucx negative     GEORGE on CKD III  - Improved.   - Possible ATN from hypoperfusion/anemia vs sepsis  - baseline creat 1.3 per EMR   - Monitor Uop.   - UA with hyaline casts indicative of  decreased renal perfusion     A.fib   - hold eliquis   - rate controlled.   - consider eventual outpt watchman with recurrent GI bleed    Acute on Chronic combined CHF   - ECHO from 10/2023 with LVEF 37% grade 4 TR.   - Cards consult.   - Lasix 20mg IV BID   - coreg 25mg BID   - entresto   - daily weights , I/O     Other medical problems  H/o RA  H/o small bowel AVM's    Dispo Ok to transfer to cardiac tele       >55min spent, >50% spent counseling and coordinating care in the form of educating pt/family and d/w consultants and staff. Most of the time spent discussing the above plan.        Plan of care discussed with patient or family at bedside.    Regan Cruz MD  Hospitalist          Supplementary Documentation:     Quality:  DVT Prophylaxis: SCD  CODE status: Full  Dispo: per clinical course           Estimated date of discharge: TBD  Discharge is dependent on: clinical stability  At this point Ms. Silverio is expected to be discharge to: home

## 2024-01-14 NOTE — PLAN OF CARE
Pt is aox3, resting in bed, bed is low and locked in place, call light is within reach. Pt weaned off oxygen. Receiving iv abx. Potassium replaced per protocol. Call light within reach. Pt cleared for tx to floor  Problem: Patient Centered Care  Goal: Patient preferences are identified and integrated in the patient's plan of care  Description: Interventions:  - What would you like us to know as we care for you?   - Provide timely, complete, and accurate information to patient/family  - Incorporate patient and family knowledge, values, beliefs, and cultural backgrounds into the planning and delivery of care  - Encourage patient/family to participate in care and decision-making at the level they choose  - Honor patient and family perspectives and choices  Outcome: Progressing     Problem: Patient/Family Goals  Goal: Patient/Family Long Term Goal  Description: Patient's Long Term Goal:     Interventions:  - iv abx  - See additional Care Plan goals for specific interventions  Outcome: Progressing  Goal: Patient/Family Short Term Goal  Description: Patient's Short Term Goal:     Interventions:   - dc planning  - See additional Care Plan goals for specific interventions  Outcome: Progressing     Problem: RISK FOR INFECTION - ADULT  Goal: Absence of fever/infection during anticipated neutropenic period  Description: INTERVENTIONS  - Monitor WBC  - Administer growth factors as ordered  - Implement neutropenic guidelines  Outcome: Progressing     Problem: SAFETY ADULT - FALL  Goal: Free from fall injury  Description: INTERVENTIONS:  - Assess pt frequently for physical needs  - Identify cognitive and physical deficits and behaviors that affect risk of falls.  - [place] fall precautions as indicated by assessment.  - Educate pt/family on patient safety including physical limitations  - Instruct pt to call for assistance with activity based on assessment  - Modify environment to reduce risk of injury  - Provide assistive  devices as appropriate  - Consider OT/PT consult to assist with strengthening/mobility  - Encourage toileting schedule  Outcome: Progressing     Problem: DISCHARGE PLANNING  Goal: Discharge to home or other facility with appropriate resources  Description: INTERVENTIONS:  - Identify barriers to discharge w/pt and caregiver  - Include patient/family/discharge partner in discharge planning  - Arrange for needed discharge resources and transportation as appropriate  - Identify discharge learning needs (meds, wound care, etc)  - Arrange for interpreters to assist at discharge as needed  - Consider post-discharge preferences of patient/family/discharge partner  - Complete POLST form as appropriate  - Assess patient's ability to be responsible for managing their own health  - Refer to Case Management Department for coordinating discharge planning if the patient needs post-hospital services based on physician/LIP order or complex needs related to functional status, cognitive ability or social support system  Outcome: Progressing     Problem: RESPIRATORY - ADULT  Goal: Achieves optimal ventilation and oxygenation  Description: INTERVENTIONS:  - Assess for changes in respiratory status  - Assess for changes in mentation and behavior  - Position to facilitate oxygenation and minimize respiratory effort  - Oxygen supplementation based on oxygen saturation or ABGs  - Provide Smoking Cessation handout, if applicable  - Encourage broncho-pulmonary hygiene including cough, deep breathe, Incentive Spirometry  - Assess the need for suctioning and perform as needed  - Assess and instruct to report SOB or any respiratory difficulty  - Respiratory Therapy support as indicated  - Manage/alleviate anxiety  - Monitor for signs/symptoms of CO2 retention  Outcome: Progressing     Problem: HEMATOLOGIC - ADULT  Goal: Maintains hematologic stability  Description: INTERVENTIONS  - Assess for signs and symptoms of bleeding or hemorrhage  -  Monitor labs and vital signs for trends  - Administer supportive blood products/factors, fluids and medications as ordered and appropriate  - Administer supportive blood products/factors as ordered and appropriate  Outcome: Progressing  Goal: Free from bleeding injury  Description: (Example usage: patient with low platelets)  INTERVENTIONS:  - Avoid intramuscular injections, enemas and rectal medication administration  - Ensure safe mobilization of patient  - Hold pressure on venipuncture sites to achieve adequate hemostasis  - Assess for signs and symptoms of internal bleeding  - Monitor lab trends  - Patient is to report abnormal signs of bleeding to staff  - Avoid use of toothpicks and dental floss  - Use electric shaver for shaving  - Use soft bristle tooth brush  - Limit straining and forceful nose blowing  Outcome: Progressing     Problem: GASTROINTESTINAL - ADULT  Goal: Maintains or returns to baseline bowel function  Description: INTERVENTIONS:  - Assess bowel function  - Maintain adequate hydration with IV or PO as ordered and tolerated  - Evaluate effectiveness of GI medications  - Encourage mobilization and activity  - Obtain nutritional consult as needed  - Establish a toileting routine/schedule  - Consider collaborating with pharmacy to review patient's medication profile  Outcome: Progressing

## 2024-01-14 NOTE — CONSULTS
[facility]  Cardiology Consultation    Margaret Silverio Patient Status:  Inpatient    3/14/1945 MRN [id number]   Location [facility] 2W/SW Attending Regan Cruz MD   Hosp Day # 1 PCP Johnathon Rodriguez DO     Reason for Consultation:  Fluid management    History of Present Illness:  Margaret Silverio is a a(n) 78 year old female who presents with complaint of fatigue and melena with GI bleed.  Received significant amount of fluid resuscitation.  Complaint of dyspnea yesterday.  Is a history of nonischemic cardiomyopathy with EF of around 35%, chronic atrial fibrillation/flutter and labile hypertension.    History:  Past Medical History:   Diagnosis Date    Anemia     Arrhythmia     Arthritis     Essential hypertension     High blood pressure     Seizure disorder (HCC)     Seizures (HCC)      Past Surgical History:   Procedure Laterality Date    OTHER SURGICAL HISTORY  2017    Heart Surgery     OTHER SURGICAL HISTORY  2020    Bilateral shoulder replacement      No family history on file.   reports that she has never smoked. She has never used smokeless tobacco. She reports that she does not drink alcohol and does not use drugs.    Allergies:  Allergies   Allergen Reactions    Lisinopril Coughing       Medications:    Current Facility-Administered Medications:     potassium phosphate dibasic 15 mmol in sodium chloride 0.9% 250 mL IVPB, 15 mmol, Intravenous, Once **FOLLOWED BY** potassium chloride 20 mEq/100mL IVPB premix 20 mEq, 20 mEq, Intravenous, Once    furosemide (Lasix) 10 mg/mL injection 20 mg, 20 mg, Intravenous, BID (Diuretic)    carvedilol (Coreg) tab 25 mg, 25 mg, Oral, BID    sacubitril-valsartan (Entresto) 24-26 MG per tab 1 tablet, 1 tablet, Oral, BID    ondansetron (Zofran) 4 MG/2ML injection 4 mg, 4 mg, Intravenous, Q6H PRN    piperacillin-tazobactam (Zosyn) 3.375 g in dextrose 5% 100 mL IVPB-ADDV, 3.375 g, Intravenous, Q8H    pantoprazole (Protonix) 40 mg in  sodium chloride 0.9% PF 10 mL IV push, 40 mg, Intravenous, Q12H    HYDROcodone-acetaminophen (Norco) 5-325 MG per tab 1 tablet, 1 tablet, Oral, Q6H PRN    glucose (Glutose) 40% oral gel 15 g, 15 g, Oral, Once    Review of Systems:  A comprehensive review of systems was negative if not otherwise mention in above HPI.    /86 (BP Location: Right arm)   Pulse 60   Temp 98 °F (36.7 °C) (Temporal)   Resp 15   Ht 5' 5\" (1.651 m)   Wt 150 lb 5.7 oz (68.2 kg)   SpO2 100%   BMI 25.02 kg/m²   Temp (24hrs), Av.1 °F (36.7 °C), Min:97 °F (36.1 °C), Max:98.6 °F (37 °C)       Intake/Output Summary (Last 24 hours) at 2024 0815  Last data filed at 2024 0600  Gross per 24 hour   Intake 1928.3 ml   Output 1550 ml   Net 378.3 ml     Wt Readings from Last 3 Encounters:   24 150 lb 5.7 oz (68.2 kg)   23 145 lb (65.8 kg)   23 145 lb (65.8 kg)       Physical Exam:   General: Alert and oriented x 3. No apparent distress. No respiratory or constitutional distress.  HEENT: Normocephalic, anicteric sclera, neck supple.  Neck: No JVD, carotids 2+, no bruits.  Cardiac: Regular rate and rhythm. S1, S2 normal. No murmur, pericardial rub, S3.  Lungs: Clear without wheezes, rales, rhonchi or dullness.  Normal excursions and effort.  Abdomen: Soft, non-tender. BS-present.  Extremities: Without clubbing, cyanosis or edema.  Peripheral pulses are 2+.  Neurologic: Alert and oriented, normal affect.  Skin: Warm and dry.     Laboratory Data:  Lab Results   Component Value Date    WBC 3.1 2024    HGB 8.9 2024    HCT 27.5 2024    PLT 35.0 2024    CREATSERUM 1.26 2024    BUN 20 2024     2024    K 3.4 2024    K 3.4 2024     2024    CO2 26.0 2024    GLU 75 2024    CA 8.5 2024    ALB 3.2 2024    INR 2.60 2024    PTP 29.4 2024    PHOS 2.0 2024    B12 >2,000 2024    PGLU 89 2024        Imaging:  EKG, blood test and chest x-ray reviewed  Impression:  Recurrent GI bleed on long-term anticoagulation  Chronic A-fib flutter  Nonischemic cardiomyopathy with EF 35%  Hypertension  Rheumatoid arthritis    Recommendations:  Continue IV diuretics twice a day  Restart Entresto  Consider Watchman as a long-term alternative to anticoagulation with recurrent GI bleed      Thank you for allowing me to participate in the care of your patient.    Ricco Encinas MD  1/14/2024  8:15 AM    C5

## 2024-01-14 NOTE — PROGRESS NOTES
[facility]     Gastroenterology Progress Note    Margaret Silverio Patient Status:  Inpatient    3/14/1945 MRN [id number]   Location [facility] 2W/SW Attending Regan Cruz MD   Hosp Day # 1 PCP Johnathon Rodriguez,        Assessment and Plan:   GI bleed-likely recurrent blood loss/AVM.  Her hemoglobin seems to be stable after transfusion we will continue to monitor.  For now she has pancytopenia including low white cell count, hemoglobin and platelets.  Consider multifactorial process such as sepsis like picture.  She is improving.    We discussed upper endoscopy, timing to depend on clinical picture.  Her INR still elevated at 2.6 and her platelet count remains in the 3000 range.  Will wait for improvement.    Recommend:  -EGD on above parameters are improving.  Emergently if evidence of active bleeding.  -Hospitalist service for patient's pancytopenia/possible sepsis or other process.  -Continue PPI therapy  -Advance diet as tolerated        Subjective:   No abdominal pain, no nausea or vomiting, no further black stools.    Objective:   Patient Vitals for the past 24 hrs:   BP Temp Temp src Pulse Resp SpO2   24 1100 140/77 -- -- 60 17 94 %   24 1000 136/81 -- -- 60 17 97 %   24 0900 124/77 -- -- 58 14 99 %   24 0800 133/73 97.1 °F (36.2 °C) Temporal 60 17 100 %   24 0400 143/86 98 °F (36.7 °C) Temporal 60 15 100 %   24 0000 144/76 97 °F (36.1 °C) Temporal 59 19 100 %   24 2000 142/86 98.6 °F (37 °C) Temporal 60 22 100 %   24 1845 147/77 -- -- 60 19 100 %   24 1800 155/78 -- -- 60 21 100 %   24 1700 135/80 -- -- 56 17 100 %   24 1600 (!) 142/98 98.1 °F (36.7 °C) Temporal 60 19 100 %   24 1500 143/86 -- -- 60 19 100 %   24 1415 144/76 98.3 °F (36.8 °C) Temporal 60 15 100 %   24 1400 (!) 138/91 -- -- 60 17 100 %     Body mass index is 25.02 kg/m².    Gen- Patient appears comfortable and in no  acute distress  HEENT:Negative for scleral icterus.  Mucous membranes are moist.  CV- regular rate and rhythm   Lung- Clear to auscultation bilaterally  Abdomen-Non-distended.  Bowel sounds are present.  There is no tenderness to palpation.  There are no masses appreciated.  Liver and spleen are not palpable.  Skin- No jaundice.  Warm and dry.  Ext: No cyanosis, clubbing or edema is evident.   Neuro- Alert and interactive, and gross movements of extremities normal  Affect-Normal      Results:     Recent Labs   Lab 01/12/24  2100 01/13/24  0242 01/13/24  0536 01/13/24  1358 01/13/24  1821 01/14/24  0412   RBC 2.85*  --  2.32*  --  2.94* 2.96*   HGB 8.6*   < > 7.1* 8.5* 9.3* 8.9*   HCT 28.9*   < > 22.1* 26.4* 26.5* 27.5*   .4*  --  95.3  --  90.1 92.9   MCH 30.2  --  30.6  --  31.6 30.1   MCHC 29.8*  --  32.1  --  35.1 32.4   RDW 17.4*  --  17.0*  --  18.0* 17.9*   NEPRELIM 1.49*  --  2.36  --   --   --    WBC 2.9*  --  3.0*  --  3.9* 3.1*   PLT  --   --  38.0*  --  26.0* 35.0*    < > = values in this interval not displayed.         Recent Labs   Lab 01/12/24 2059 01/13/24  0536 01/14/24 0412   GLU 47* 200* 75   BUN 27* 26* 20   CREATSERUM 1.57* 1.32* 1.26*   CA 9.0 8.2* 8.5*   ALB 3.6 3.0* 3.2   * 139 140   K 5.0 3.4* 3.4*  3.4*    103 106   CO2 15.0* 27.0 26.0   ALKPHO 135 110  --    AST 38* 38*  --    ALT 17 15  --    BILT 2.2* 1.6*  --    TP 6.8 5.6*  --        Lab Results   Component Value Date    INR 2.60 (H) 01/14/2024    INR 3.63 (H) 01/13/2024    INR 5.10 (HH) 01/13/2024       US VENOUS DOPPLER ARM LEFT - DIAG IMG (CPT=93971)    Result Date: 1/14/2024  CONCLUSION: No evidence of left upper extremity DVT.   Dictated by (CST): Maxx Enriquez MD on 1/14/2024 at 12:04 PM     Finalized by (CST): Maxx Enriquez MD on 1/14/2024 at 12:05 PM          XR CHEST AP PORTABLE  (CPT=71045)    Result Date: 1/14/2024  CONCLUSION:   Mild interstitial edema is unchanged.  Small right pleural effusion with  right lower lobe airspace opacity which may represent atelectasis or pneumonia is also unchanged.    Dictated by (CST): Kane Garcia MD on 1/14/2024 at 7:58 AM     Finalized by (CST): Kane Garcia MD on 1/14/2024 at 8:00 AM          XR CHEST AP PORTABLE  (CPT=71045)    Result Date: 1/13/2024  CONCLUSION:  1.  Findings compatible with CHF.  Cardiomegaly, mitral prosthesis and pacing device/ICD.  Vascular congestion, and right pleural effusion. 2.  Post bilateral shoulder arthroplasties. 3.  Right basal pulmonary opacity which could represent secondary compressive atelectasis however correlate for pneumonia.   Dictated by (CST): Maxx Enriquez MD on 1/13/2024 at 2:20 PM     Finalized by (CST): Maxx Enriquez MD on 1/13/2024 at 2:24 PM          CT CHEST PE AORTA (IV ONLY) (CPT=71260)    Result Date: 1/13/2024  CONCLUSION:  1. No acute pulmonary embolus through segmental level. 2. Small to moderate loculated right pleural effusion and small left pleural effusion. 3. Mitral valve prosthesis. 4. Marked coronary artery calcification. 5. Biatrial enlargement.  Reflux of contrast from right atrium into dilated IVC and hepatic veins reflecting elevated right heart pressure. 6. Mild emphysema. 7. Small amount of ascites.    Dictated by (CST): Jesus Jackson MD on 1/13/2024 at 6:44 AM     Finalized by (CST): Jesus Jackson MD on 1/13/2024 at 6:56 AM          CT ABDOMEN PELVIS IV CONTRAST, NO ORAL (ER)    Result Date: 1/13/2024  CONCLUSION:  1. Chronic pancreatitis.  Correlation with pancreatic enzymes recommended. 2. High density material in the gallbladder could be secondary to a previous procedure, or high density sludge/bile. 3. Generalized atherosclerosis with chronically displaced intimal calcification infrarenal aorta.  Coronary artery calcification. 4. Occluded right external iliac artery with reconstitution of a diseased right common femoral artery. 5. Small to moderate loculated right pleural effusion and small left  pleural effusion.  Anasarca.  Small amount of ascites. 6. Please see separate chest CT report.    Dictated by (CST): Jesus Jackson MD on 1/13/2024 at 6:19 AM     Finalized by (CST): Jesus Jackson MD on 1/13/2024 at 6:36 AM          XR CHEST AP PORTABLE  (CPT=71045)    Result Date: 1/12/2024  CONCLUSION:   Increased opacity in the right mid to lower lung which may reflect combination of pleural effusion which may be partially loculated and parenchymal opacity which could reflect atelectasis or pneumonia.  Trace suspected left pleural effusion with mild left basilar opacity which also could reflect atelectasis or pneumonia.    Dictated by (CST): Azar Mendez MD on 1/12/2024 at 10:06 PM     Finalized by (CST): Azar Mendez MD on 1/12/2024 at 10:07 PM           EKG    Result Date: 1/13/2024  AV dual-paced rhythm with prolonged AV conduction Abnormal ECG When compared with ECG of 03-MAY-2023 03:23, Electronic ventricular pacemaker has replaced Sinus rhythm Vent. rate has decreased BY  41 BPM Confirmed by RAAD LY, YOLANDA ([id number]) on 1/13/2024 11:17:44 AM     Omar Mckeon MD  1/14/2024

## 2024-01-14 NOTE — PROGRESS NOTES
[facility]     Progress Note    Margaret Silverio Patient Status:  Inpatient    3/14/1945 MRN [id number]   Location [facility] 2W/SW Attending Regan Cruz MD   Hosp Day # 1 PCP Johnathon Rodriguez,        Subjective:   Patient seen and examined.  Resting in bed.  Denies significant abdominal pain.  No bowel movements since arrival to the intensive care unit.  Hemodynamically stable.    Objective:   Blood pressure 140/77, pulse 60, temperature 97.1 °F (36.2 °C), temperature source Temporal, resp. rate 17, height 5' 5\" (1.651 m), weight 150 lb 5.7 oz (68.2 kg), SpO2 94%.  Intake/Output:   Last 3 shifts: I/O last 3 completed shifts:  In: 5930 [P.O.:390; I.V.:3402.8; Blood:937.2; IV PIGGYBACK:1200]  Out: 1700 [Urine:1700]   This shift: I/O this shift:  In: -   Out: 600 [Urine:600]     Vent Settings:      Hemodynamic parameters (last 24 hours):      Scheduled Meds:   Current Facility-Administered Medications   Medication Dose Route Frequency    potassium chloride 20 mEq/100mL IVPB premix 20 mEq  20 mEq Intravenous Once    furosemide (Lasix) 10 mg/mL injection 20 mg  20 mg Intravenous BID (Diuretic)    carvedilol (Coreg) tab 25 mg  25 mg Oral BID    sacubitril-valsartan (Entresto) 24-26 MG per tab 1 tablet  1 tablet Oral BID    ondansetron (Zofran) 4 MG/2ML injection 4 mg  4 mg Intravenous Q6H PRN    piperacillin-tazobactam (Zosyn) 3.375 g in dextrose 5% 100 mL IVPB-ADDV  3.375 g Intravenous Q8H    pantoprazole (Protonix) 40 mg in sodium chloride 0.9% PF 10 mL IV push  40 mg Intravenous Q12H    HYDROcodone-acetaminophen (Norco) 5-325 MG per tab 1 tablet  1 tablet Oral Q6H PRN    glucose (Glutose) 40% oral gel 15 g  15 g Oral Once       Continuous Infusions:     Physical Exam  Constitutional: no acute distress  Eyes: PERRL  ENT: nares pateint  Neck: supple, no JVD  Cardio: RRR, S1 S2  Respiratory: clear to auscultation bilaterally, no wheezing, rales, rhonchi, crackles  GI: abdomen soft,  non tender, active bowel sounds, no organomegaly  Extremities: no clubbing, cyanosis, edema  Neurologic: no gross motor deficits  Skin: warm, dry      Results:     Lab Results   Component Value Date    WBC 3.1 01/14/2024    HGB 8.9 01/14/2024    HCT 27.5 01/14/2024    PLT 35.0 01/14/2024    CREATSERUM 1.26 01/14/2024    BUN 20 01/14/2024     01/14/2024    K 3.4 01/14/2024    K 3.4 01/14/2024     01/14/2024    CO2 26.0 01/14/2024    GLU 75 01/14/2024    CA 8.5 01/14/2024    ALB 3.2 01/14/2024    INR 2.60 01/14/2024    PHOS 2.0 01/14/2024    B12 >2,000 01/14/2024       US VENOUS DOPPLER ARM LEFT - DIAG IMG (CPT=93971)    Result Date: 1/14/2024  CONCLUSION: No evidence of left upper extremity DVT.   Dictated by (CST): Maxx Enriquez MD on 1/14/2024 at 12:04 PM     Finalized by (CST): Maxx Enriquez MD on 1/14/2024 at 12:05 PM          XR CHEST AP PORTABLE  (CPT=71045)    Result Date: 1/14/2024  CONCLUSION:   Mild interstitial edema is unchanged.  Small right pleural effusion with right lower lobe airspace opacity which may represent atelectasis or pneumonia is also unchanged.    Dictated by (CST): Kane Garcia MD on 1/14/2024 at 7:58 AM     Finalized by (CST): Kane Garcia MD on 1/14/2024 at 8:00 AM          XR CHEST AP PORTABLE  (CPT=71045)    Result Date: 1/13/2024  CONCLUSION:  1.  Findings compatible with CHF.  Cardiomegaly, mitral prosthesis and pacing device/ICD.  Vascular congestion, and right pleural effusion. 2.  Post bilateral shoulder arthroplasties. 3.  Right basal pulmonary opacity which could represent secondary compressive atelectasis however correlate for pneumonia.   Dictated by (CST): Maxx Enriquez MD on 1/13/2024 at 2:20 PM     Finalized by (CST): Maxx Enriquez MD on 1/13/2024 at 2:24 PM          CT CHEST PE AORTA (IV ONLY) (CPT=71260)    Result Date: 1/13/2024  CONCLUSION:  1. No acute pulmonary embolus through segmental level. 2. Small to moderate loculated right pleural effusion and small  left pleural effusion. 3. Mitral valve prosthesis. 4. Marked coronary artery calcification. 5. Biatrial enlargement.  Reflux of contrast from right atrium into dilated IVC and hepatic veins reflecting elevated right heart pressure. 6. Mild emphysema. 7. Small amount of ascites.    Dictated by (CST): Jesus Jackson MD on 1/13/2024 at 6:44 AM     Finalized by (CST): Jesus Jackson MD on 1/13/2024 at 6:56 AM          CT ABDOMEN PELVIS IV CONTRAST, NO ORAL (ER)    Result Date: 1/13/2024  CONCLUSION:  1. Chronic pancreatitis.  Correlation with pancreatic enzymes recommended. 2. High density material in the gallbladder could be secondary to a previous procedure, or high density sludge/bile. 3. Generalized atherosclerosis with chronically displaced intimal calcification infrarenal aorta.  Coronary artery calcification. 4. Occluded right external iliac artery with reconstitution of a diseased right common femoral artery. 5. Small to moderate loculated right pleural effusion and small left pleural effusion.  Anasarca.  Small amount of ascites. 6. Please see separate chest CT report.    Dictated by (CST): Jesus Jackson MD on 1/13/2024 at 6:19 AM     Finalized by (CST): Jesus Jackson MD on 1/13/2024 at 6:36 AM          XR CHEST AP PORTABLE  (CPT=71045)    Result Date: 1/12/2024  CONCLUSION:   Increased opacity in the right mid to lower lung which may reflect combination of pleural effusion which may be partially loculated and parenchymal opacity which could reflect atelectasis or pneumonia.  Trace suspected left pleural effusion with mild left basilar opacity which also could reflect atelectasis or pneumonia.    Dictated by (CST): Azar Mendez MD on 1/12/2024 at 10:06 PM     Finalized by (CST): Azar Mendez MD on 1/12/2024 at 10:07 PM           EKG    Result Date: 1/13/2024  AV dual-paced rhythm with prolonged AV conduction Abnormal ECG When compared with ECG of 03-MAY-2023 03:23, Electronic ventricular pacemaker  has replaced Sinus rhythm Vent. rate has decreased BY  41 BPM Confirmed by RAAD LY, YOLANDA ([id number]) on 1/13/2024 11:17:44 AM     Assessment   1.  Acute hypoxemic respiratory failure  2.  Possible GI bleed  3.  Pancytopenia  4.  Lactic acidosis  5.  Acute kidney injury  6.  Hypokalemia  7.  Atrial fibrillation  8.  CHF  9.  Coagulopathy  10.  Nonischemic cardiomyopathy     Plan   -Patient presents with evidence of fatigue, dyspnea and concern for melanotic stools over the last several days  -Oxygenation requirements improved during hospital course.  Weaned off BiPAP  -CT chest on presentation with no pulmonary embolism seen.  Small loculated right and small left pleural effusion seen.  No significant consolidation seen  -CT abdomen pelvis on presentation with findings consistent with chronic pancreatitis  -Empiric IV antibiotic therapy at this time.  -Wait blood and urine culture results  -Transfuse for hemoglobin below 7.   -Discussed with GI.  Plan for possible endoscopic evaluation when better optimized.  Continue PPI therapy at this time  -50 FFP and vitamin K earlier.  -Diuresis as tolerated  -Electrolyte repletion  -Closely monitor renal function and output  -Hold anticoagulation at this time    Augustin Parsons, DO  Pulmonary Critical Care Medicine  [facility]

## 2024-01-14 NOTE — PLAN OF CARE
PtMushtaq Margaret is A&Ox 4 . Lives at home with family . Patient is experiencing pain, tx with norco . Continent of bowel and incontinent bladder. Transferred from CCU. Call light within reach, fall precautions maintained.     Plan is NPO at midnight for EGD tomorrow .    Problem: Patient Centered Care  Goal: Patient preferences are identified and integrated in the patient's plan of care  Description: Interventions:  - What would you like us to know as we care for you? I live at home with family  - Provide timely, complete, and accurate information to patient/family  - Incorporate patient and family knowledge, values, beliefs, and cultural backgrounds into the planning and delivery of care  - Encourage patient/family to participate in care and decision-making at the level they choose  - Honor patient and family perspectives and choices  Outcome: Progressing     Problem: RISK FOR INFECTION - ADULT  Goal: Absence of fever/infection during anticipated neutropenic period  Description: INTERVENTIONS  - Monitor WBC  - Administer growth factors as ordered  - Implement neutropenic guidelines  Outcome: Progressing     Problem: SAFETY ADULT - FALL  Goal: Free from fall injury  Description: INTERVENTIONS:  - Assess pt frequently for physical needs  - Identify cognitive and physical deficits and behaviors that affect risk of falls.  - [place] fall precautions as indicated by assessment.  - Educate pt/family on patient safety including physical limitations  - Instruct pt to call for assistance with activity based on assessment  - Modify environment to reduce risk of injury  - Provide assistive devices as appropriate  - Consider OT/PT consult to assist with strengthening/mobility  - Encourage toileting schedule  Outcome: Progressing     Problem: DISCHARGE PLANNING  Goal: Discharge to home or other facility with appropriate resources  Description: INTERVENTIONS:  - Identify barriers to discharge w/pt and caregiver  - Include  patient/family/discharge partner in discharge planning  - Arrange for needed discharge resources and transportation as appropriate  - Identify discharge learning needs (meds, wound care, etc)  - Arrange for interpreters to assist at discharge as needed  - Consider post-discharge preferences of patient/family/discharge partner  - Complete POLST form as appropriate  - Assess patient's ability to be responsible for managing their own health  - Refer to Case Management Department for coordinating discharge planning if the patient needs post-hospital services based on physician/LIP order or complex needs related to functional status, cognitive ability or social support system  Outcome: Progressing     Problem: RESPIRATORY - ADULT  Goal: Achieves optimal ventilation and oxygenation  Description: INTERVENTIONS:  - Assess for changes in respiratory status  - Assess for changes in mentation and behavior  - Position to facilitate oxygenation and minimize respiratory effort  - Oxygen supplementation based on oxygen saturation or ABGs  - Provide Smoking Cessation handout, if applicable  - Encourage broncho-pulmonary hygiene including cough, deep breathe, Incentive Spirometry  - Assess the need for suctioning and perform as needed  - Assess and instruct to report SOB or any respiratory difficulty  - Respiratory Therapy support as indicated  - Manage/alleviate anxiety  - Monitor for signs/symptoms of CO2 retention  Outcome: Progressing     Problem: HEMATOLOGIC - ADULT  Goal: Maintains hematologic stability  Description: INTERVENTIONS  - Assess for signs and symptoms of bleeding or hemorrhage  - Monitor labs and vital signs for trends  - Administer supportive blood products/factors, fluids and medications as ordered and appropriate  - Administer supportive blood products/factors as ordered and appropriate  Outcome: Progressing  Goal: Free from bleeding injury  Description: (Example usage: patient with low  platelets)  INTERVENTIONS:  - Avoid intramuscular injections, enemas and rectal medication administration  - Ensure safe mobilization of patient  - Hold pressure on venipuncture sites to achieve adequate hemostasis  - Assess for signs and symptoms of internal bleeding  - Monitor lab trends  - Patient is to report abnormal signs of bleeding to staff  - Avoid use of toothpicks and dental floss  - Use electric shaver for shaving  - Use soft bristle tooth brush  - Limit straining and forceful nose blowing  Outcome: Progressing     Problem: GASTROINTESTINAL - ADULT  Goal: Maintains or returns to baseline bowel function  Description: INTERVENTIONS:  - Assess bowel function  - Maintain adequate hydration with IV or PO as ordered and tolerated  - Evaluate effectiveness of GI medications  - Encourage mobilization and activity  - Obtain nutritional consult as needed  - Establish a toileting routine/schedule  - Consider collaborating with pharmacy to review patient's medication profile  Outcome: Progressing

## 2024-01-14 NOTE — CONSULTS
Hematology Oncology Consult Note      Margaret Silverio Patient Status:  Inpatient    3/14/1945 MRN [id number]   Location [facility] 2W/SW Attending Regan Cruz MD   Hosp Day # 1 PCP Johnathon Rodriguez DO     Reason for Consultation: Pancytopenia    History of Present Illness:  Margaret Silverio is a 78 year old Black or  female history of HTN, CHF/NICM s/p ICD 2023, A fib on anticoagulation with eliquis, rheumatoid arthritis on methotrexate, osteoporosis, UGI bleed, seizure disorder, presented to ED on 24 with worsening dyspnea, fatigue and melena x1 week, found to be hypoxic with  Hgb 8.6-->7.3, platelets 38, WBC 2.9, INR 5.1,  lactic acid 12 admitted for management of presumed sepsis and GI bleed. CT imaging significant for chronic pancreatitis, otherwise no acute process. She was placed on BiPAP, started on broad spectrum antibiotics, received 1 pRBC, 1 FFP and vitamin K. Eliquis held. Hematology consulted for further evaluation.   Patient has long history of anemia for many years, seen by hematology at [place] few years ago and underwent a bone marrow biopsy that was unremarkable per patient. No records available to my review. Her anemia was attributed to chronic inflammation and medication effects. 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 No history of liver disease or alcohol use.  No recent illness or infections. No new medications.   She reports extreme weakness and fatigue and poor po intake x4 days prior to admission. Stated she feels better today, transferred out of ICU, comfortable on room air. Hgb stable. No bowel movements since admission. Denies any pain, nausea, vomiting, chest pain, dyspnea at rest, fever, night sweats, or recent weight loss.      History:  Past Medical History:   Diagnosis Date    Anemia     Arrhythmia     Arthritis     Essential hypertension     High blood pressure     Seizure disorder (HCC)     Seizures (HCC)       Past Surgical History:   Procedure Laterality Date    OTHER SURGICAL HISTORY  2017    Heart Surgery     OTHER SURGICAL HISTORY  2020    Bilateral shoulder replacement      No family history on file.   reports that she has never smoked. She has never used smokeless tobacco. She reports that she does not drink alcohol and does not use drugs.    Allergies:  Allergies   Allergen Reactions    Lisinopril Coughing       Medications:    Current Facility-Administered Medications:     potassium phosphate dibasic 15 mmol in sodium chloride 0.9% 250 mL IVPB, 15 mmol, Intravenous, Once **FOLLOWED BY** potassium chloride 20 mEq/100mL IVPB premix 20 mEq, 20 mEq, Intravenous, Once    furosemide (Lasix) 10 mg/mL injection 20 mg, 20 mg, Intravenous, BID (Diuretic)    carvedilol (Coreg) tab 25 mg, 25 mg, Oral, BID    sacubitril-valsartan (Entresto) 24-26 MG per tab 1 tablet, 1 tablet, Oral, BID    ondansetron (Zofran) 4 MG/2ML injection 4 mg, 4 mg, Intravenous, Q6H PRN    piperacillin-tazobactam (Zosyn) 3.375 g in dextrose 5% 100 mL IVPB-ADDV, 3.375 g, Intravenous, Q8H    pantoprazole (Protonix) 40 mg in sodium chloride 0.9% PF 10 mL IV push, 40 mg, Intravenous, Q12H    HYDROcodone-acetaminophen (Norco) 5-325 MG per tab 1 tablet, 1 tablet, Oral, Q6H PRN    glucose (Glutose) 40% oral gel 15 g, 15 g, Oral, Once    Review of Systems:  A 12-point review of systems was done with pertinent positives and negatives per the HPI.    Weight:  Wt Readings from Last 6 Encounters:   01/13/24 68.2 kg (150 lb 5.7 oz)   12/26/23 65.8 kg (145 lb)   09/21/23 65.8 kg (145 lb)   08/21/23 64.3 kg (141 lb 12.8 oz)   07/17/23 64 kg (141 lb)   06/15/23 64 kg (141 lb)       Vital Sings:  /77 (BP Location: Right arm)   Pulse 60   Temp 97.1 °F (36.2 °C) (Temporal)   Resp 17   Ht 1.651 m (5' 5\")   Wt 68.2 kg (150 lb 5.7 oz)   SpO2 94%   BMI 25.02 kg/m²     Physical Exam:   General: Patient is alert and oriented x 3, not in acute  distress.   HEENT: EOMs intact. Anicteric sclera. Pink conjunctiva. Oropharynx is clear.   Neck: No palpable lymphadenopathy. Neck is supple.  Chest: Clear to auscultation.  No rales or wheezes.  Heart: Regular rate and rhythm.   Abdomen: Soft, non tender with good bowel sounds. No hepatosplenomegaly.  Extremities: Pedal pulses are present. No edema and no tenderness.  Neurological: Grossly intact.   Psych: Appropriate mood and affect.     Laboratory Data:    Recent Labs   Lab 01/12/24  2100 01/13/24  0242 01/13/24  0536 01/13/24  1358 01/13/24  1821 01/14/24  0412   RBC 2.85*  --  2.32*  --  2.94* 2.96*   HGB 8.6*   < > 7.1* 8.5* 9.3* 8.9*   HCT 28.9*   < > 22.1* 26.4* 26.5* 27.5*   .4*  --  95.3  --  90.1 92.9   MCH 30.2  --  30.6  --  31.6 30.1   MCHC 29.8*  --  32.1  --  35.1 32.4   RDW 17.4*  --  17.0*  --  18.0* 17.9*   NEPRELIM 1.49*  --  2.36  --   --   --    WBC 2.9*  --  3.0*  --  3.9* 3.1*   PLT  --   --  38.0*  --  26.0* 35.0*    < > = values in this interval not displayed.       Recent Labs   Lab 01/12/24 2059 01/13/24  0536 01/14/24  0412   GLU 47* 200* 75   BUN 27* 26* 20   CREATSERUM 1.57* 1.32* 1.26*   EGFRCR 34* 42* 44*   CA 9.0 8.2* 8.5*   ALB 3.6 3.0* 3.2   * 139 140   K 5.0 3.4* 3.4*  3.4*    103 106   CO2 15.0* 27.0 26.0   ALKPHO 135 110  --    AST 38* 38*  --    ALT 17 15  --    BILT 2.2* 1.6*  --    TP 6.8 5.6*  --         Recent Labs   Lab 01/12/24 2059 01/13/24  0818 01/13/24  1821 01/14/24  0412   INR 5.24* 5.10* 3.63* 2.60*   PTT 44.1*  --   --   --         Imaging:  XR CHEST AP PORTABLE  (CPT=71045)    Result Date: 1/14/2024  CONCLUSION:   Mild interstitial edema is unchanged.  Small right pleural effusion with right lower lobe airspace opacity which may represent atelectasis or pneumonia is also unchanged.    Dictated by (CST): Kane Garcia MD on 1/14/2024 at 7:58 AM     Finalized by (CST): Kane Garcia MD on 1/14/2024 at 8:00 AM          XR CHEST AP PORTABLE   (CPT=71045)    Result Date: 1/13/2024  CONCLUSION:  1.  Findings compatible with CHF.  Cardiomegaly, mitral prosthesis and pacing device/ICD.  Vascular congestion, and right pleural effusion. 2.  Post bilateral shoulder arthroplasties. 3.  Right basal pulmonary opacity which could represent secondary compressive atelectasis however correlate for pneumonia.   Dictated by (CST): Maxx Enriquez MD on 1/13/2024 at 2:20 PM     Finalized by (CST): Maxx Enriquez MD on 1/13/2024 at 2:24 PM          CT CHEST PE AORTA (IV ONLY) (CPT=71260)    Result Date: 1/13/2024  CONCLUSION:  1. No acute pulmonary embolus through segmental level. 2. Small to moderate loculated right pleural effusion and small left pleural effusion. 3. Mitral valve prosthesis. 4. Marked coronary artery calcification. 5. Biatrial enlargement.  Reflux of contrast from right atrium into dilated IVC and hepatic veins reflecting elevated right heart pressure. 6. Mild emphysema. 7. Small amount of ascites.    Dictated by (CST): Jesus Jackson MD on 1/13/2024 at 6:44 AM     Finalized by (CST): Jesus Jackson MD on 1/13/2024 at 6:56 AM          CT ABDOMEN PELVIS IV CONTRAST, NO ORAL (ER)    Result Date: 1/13/2024  CONCLUSION:  1. Chronic pancreatitis.  Correlation with pancreatic enzymes recommended. 2. High density material in the gallbladder could be secondary to a previous procedure, or high density sludge/bile. 3. Generalized atherosclerosis with chronically displaced intimal calcification infrarenal aorta.  Coronary artery calcification. 4. Occluded right external iliac artery with reconstitution of a diseased right common femoral artery. 5. Small to moderate loculated right pleural effusion and small left pleural effusion.  Anasarca.  Small amount of ascites. 6. Please see separate chest CT report.    Dictated by (CST): Jesus Jackson MD on 1/13/2024 at 6:19 AM     Finalized by (CST): Jesus Jackson MD on 1/13/2024 at 6:36 AM          XR CHEST AP PORTABLE   (CPT=71045)    Result Date: 1/12/2024  CONCLUSION:   Increased opacity in the right mid to lower lung which may reflect combination of pleural effusion which may be partially loculated and parenchymal opacity which could reflect atelectasis or pneumonia.  Trace suspected left pleural effusion with mild left basilar opacity which also could reflect atelectasis or pneumonia.    Dictated by (CST): Azar Mendez MD on 1/12/2024 at 10:06 PM     Finalized by (CST): Azar Mendez MD on 1/12/2024 at 10:07 PM              Impression and Plan:      Pancytopenia  Acute on chronic anemia   - chronic anemia multifactorial; anemia of chronic inflammation, renal failure, drug induced (methotrexate) acutely worsening due to GI blood loss in the setting of anticoagulation  - prior BMBx was negative per patient- done in [place] few years ago.   - s/p 1 unit pRBC on 1/13 with adequate response 7.1--> 9.3--> 8.9   - check retic count, folate, B12, hemolysis labs, sTR   - transfuse as needed to keep Hgb > 8 given cardiac disease and hypoxia    Thrombocytopenia  - severe acute thrombocytopenia; admit plt 26-38, normal count at baseline  - likely consumptive in the setting of acute illness, sepsis and bleeding  - check peripheral smear, hemolysis labs,  - transfuse as needed to maintain plt > 20 or >50 if actively bleeding  - may transfuse 1-2 units platelets to raise count > 50 prior to endoscopy    Melena  - h/o small AVM in the duodenal bulb s/p hemoclip 4/2023  - GI consulted, on PPI  - no sings of active bleeding. No BM since admission.   - continue holding AC    Coagulopathy  - admit INR 5.1 likely secondary to DOAC and vit K deficiency from poor nutrition  - no history of liver disease or alcohol use  - s/p 1 FFP and  vitamin K 2 mg IV on 1/13--> INR down to 2.6  - check PTT, fibrinogen, monitor INR daily  - continue holding eliquis     Sepsis  - on IVF and empiric abx per primary     Rheumatoid arthritis   - on  methotrexate > 20 yrs and humira added 2021      Thank you for the opportunity to participate in the care of Margaret Silverio. Will continue to follow along with you.   Please contact me for any questions or concerns.       Briana Snyder MD  [facility]

## 2024-01-15 ENCOUNTER — APPOINTMENT (OUTPATIENT)
Dept: PICC SERVICES | Facility: HOSPITAL | Age: 79
End: 2024-01-15
Attending: HOSPITALIST
Payer: MEDICARE

## 2024-01-15 LAB
ALBUMIN SERPL-MCNC: 3 G/DL (ref 3.2–4.8)
ANION GAP SERPL CALC-SCNC: 6 MMOL/L (ref 0–18)
APTT PPP: 42.1 SECONDS (ref 23.3–35.6)
BUN BLD-MCNC: 16 MG/DL (ref 9–23)
BUN/CREAT SERPL: 14 (ref 10–20)
CALCIUM BLD-MCNC: 8.3 MG/DL (ref 8.7–10.4)
CHLORIDE SERPL-SCNC: 104 MMOL/L (ref 98–112)
CO2 SERPL-SCNC: 28 MMOL/L (ref 21–32)
CREAT BLD-MCNC: 1.14 MG/DL
DEPRECATED RDW RBC AUTO: 57.4 FL (ref 35.1–46.3)
DEPRECATED RDW RBC AUTO: 60.3 FL (ref 35.1–46.3)
DIRECT COOMBS POLY: NEGATIVE
EGFRCR SERPLBLD CKD-EPI 2021: 50 ML/MIN/1.73M2 (ref 60–?)
ERYTHROCYTE [DISTWIDTH] IN BLOOD BY AUTOMATED COUNT: 18 % (ref 11–15)
ERYTHROCYTE [DISTWIDTH] IN BLOOD BY AUTOMATED COUNT: 18.1 % (ref 11–15)
GLUCOSE BLD-MCNC: 90 MG/DL (ref 70–99)
GLUCOSE BLDC GLUCOMTR-MCNC: 112 MG/DL (ref 70–99)
HCT VFR BLD AUTO: 26.5 %
HCT VFR BLD AUTO: 27.5 %
HGB BLD-MCNC: 8.8 G/DL
HGB BLD-MCNC: 9.3 G/DL
INR BLD: 1.6 (ref 0.8–1.2)
MAGNESIUM SERPL-MCNC: 1.4 MG/DL (ref 1.6–2.6)
MCH RBC QN AUTO: 30.1 PG (ref 26–34)
MCH RBC QN AUTO: 31.6 PG (ref 26–34)
MCHC RBC AUTO-ENTMCNC: 32 G/DL (ref 31–37)
MCHC RBC AUTO-ENTMCNC: 35.1 G/DL (ref 31–37)
MCV RBC AUTO: 90.1 FL
MCV RBC AUTO: 94.2 FL
OSMOLALITY SERPL CALC.SUM OF ELEC: 287 MOSM/KG (ref 275–295)
PHOSPHATE SERPL-MCNC: 2.4 MG/DL (ref 2.4–5.1)
PHOSPHATE SERPL-MCNC: 2.4 MG/DL (ref 2.4–5.1)
PLATELET # BLD AUTO: 26 10(3)UL (ref 150–450)
PLATELET # BLD AUTO: 32 10(3)UL (ref 150–450)
POTASSIUM SERPL-SCNC: 3.2 MMOL/L (ref 3.5–5.1)
PROTHROMBIN TIME: 20 SECONDS (ref 11.6–14.8)
RBC # BLD AUTO: 2.92 X10(6)UL
RBC # BLD AUTO: 2.94 X10(6)UL
SODIUM SERPL-SCNC: 138 MMOL/L (ref 136–145)
WBC # BLD AUTO: 2.7 X10(3) UL (ref 4–11)
WBC # BLD AUTO: 3.9 X10(3) UL (ref 4–11)

## 2024-01-15 PROCEDURE — 05HB33Z INSERTION OF INFUSION DEVICE INTO RIGHT BASILIC VEIN, PERCUTANEOUS APPROACH: ICD-10-PCS | Performed by: HOSPITALIST

## 2024-01-15 PROCEDURE — 99233 SBSQ HOSP IP/OBS HIGH 50: CPT | Performed by: PHYSICIAN ASSISTANT

## 2024-01-15 PROCEDURE — 99233 SBSQ HOSP IP/OBS HIGH 50: CPT | Performed by: HOSPITALIST

## 2024-01-15 PROCEDURE — 99233 SBSQ HOSP IP/OBS HIGH 50: CPT | Performed by: INTERNAL MEDICINE

## 2024-01-15 RX ORDER — POTASSIUM CHLORIDE 14.9 MG/ML
20 INJECTION INTRAVENOUS ONCE
Status: COMPLETED | OUTPATIENT
Start: 2024-01-15 | End: 2024-01-16

## 2024-01-15 RX ORDER — MAGNESIUM OXIDE 400 MG/1
800 TABLET ORAL ONCE
Status: COMPLETED | OUTPATIENT
Start: 2024-01-15 | End: 2024-01-15

## 2024-01-15 RX ORDER — SODIUM CHLORIDE 9 MG/ML
INJECTION, SOLUTION INTRAVENOUS ONCE
Status: COMPLETED | OUTPATIENT
Start: 2024-01-15 | End: 2024-01-15

## 2024-01-15 NOTE — TELEPHONE ENCOUNTER
Refill Request    Medication request: HYDROcodone-acetaminophen  MG Oral Tab     LOV: 12/26/2023 eFlicitas Lee DO   RTC: Post injection  NOV: 2/6/2024 Felicitas Lee DO      [place]/Last ref[place]: 12/26/2023 #30    UDS: (if applicable): None  Pain contract: None    LOV plan (if weaning or changing medications): Norco 10 mg every 8 hours as needed for pain for the next 10 days  See pain management Dr. Warner   patient

## 2024-01-15 NOTE — PROGRESS NOTES
Hematology Oncology Progress Note    Patient Name: Margaret Silverio   YOB: 1945   Medical Record Number: [id number]   CSN: [id number]   Attending Physician: Briana Snyder MD     Subjective:  No acute events. Feels well. Afebrile. No pain, dyspnea, dizziness.   No BM since admission.     Objective:  Vitals:  Vitals:    01/14/24 1622 01/14/24 2100 01/15/24 0536 01/15/24 0821   BP: 138/82 131/83 129/80 142/82   BP Location: Right arm Right arm Right arm Right arm   Pulse: 60 60 60 60   Resp: 19 18 18 16   Temp: 98.1 °F (36.7 °C) 98 °F (36.7 °C) 98 °F (36.7 °C) 98 °F (36.7 °C)   TempSrc: Oral Oral  Oral   SpO2: 100% 97% 99% 100%   Weight:   69.4 kg (153 lb)    Height:           Current Medications:    Current Facility-Administered Medications:     potassium phosphate dibasic 15 mmol in sodium chloride 0.9% 250 mL IVPB, 15 mmol, Intravenous, Once **FOLLOWED BY** potassium chloride 20 mEq/100mL IVPB premix 20 mEq, 20 mEq, Intravenous, Once    sodium chloride 0.9% infusion, , Intravenous, Once    furosemide (Lasix) 10 mg/mL injection 20 mg, 20 mg, Intravenous, BID (Diuretic)    amitriptyline (Elavil) tab 25 mg, 25 mg, Oral, Nightly    carvedilol (Coreg) tab 25 mg, 25 mg, Oral, BID    sacubitril-valsartan (Entresto) 24-26 MG per tab 1 tablet, 1 tablet, Oral, BID    ondansetron (Zofran) 4 MG/2ML injection 4 mg, 4 mg, Intravenous, Q6H PRN    piperacillin-tazobactam (Zosyn) 3.375 g in dextrose 5% 100 mL IVPB-ADDV, 3.375 g, Intravenous, Q8H    pantoprazole (Protonix) 40 mg in sodium chloride 0.9% PF 10 mL IV push, 40 mg, Intravenous, Q12H    HYDROcodone-acetaminophen (Norco) 5-325 MG per tab 1 tablet, 1 tablet, Oral, Q6H PRN    glucose (Glutose) 40% oral gel 15 g, 15 g, Oral, Once    Physical Examination:  General: Patient is alert and oriented x 3, not in acute distress.  HEENT: EOMs intact. PERRL. Oropharynx is clear.   Neck: No palpable lymphadenopathy. Neck is supple.  Chest: Clear to auscultation.  Heart:  Regular rate and rhythm.   Abdomen: Soft, non tender with good bowel sounds.  Extremities: Pedal pulses are present. No edema.  Neurological: Grossly intact.     Labs:  Recent Labs   Lab 01/12/24  2100 01/13/24 0242 01/13/24  0536 01/13/24  1358 01/13/24  1821 01/14/24  0412 01/15/24  0653   RBC 2.85*  --  2.32*  --  2.94* 2.96* 2.92*   HGB 8.6*   < > 7.1*   < > 9.3* 8.9* 8.8*   HCT 28.9*   < > 22.1*   < > 26.5* 27.5* 27.5*   .4*  --  95.3  --  90.1 92.9 94.2   MCH 30.2  --  30.6  --  31.6 30.1 30.1   MCHC 29.8*  --  32.1  --  35.1 32.4 32.0   RDW 17.4*  --  17.0*  --  18.0* 17.9* 18.1*   NEPRELIM 1.49*  --  2.36  --   --   --   --    WBC 2.9*  --  3.0*  --  3.9* 3.1* 2.7*   PLT  --    < > 38.0*  --  26.0* 35.0* 32.0*    < > = values in this interval not displayed.     Recent Labs   Lab 01/12/24 2059 01/13/24  0536 01/14/24  0412 01/14/24  1423 01/15/24  0653   GLU 47* 200* 75  --  90   BUN 27* 26* 20  --  16   CREATSERUM 1.57* 1.32* 1.26*  --  1.14*   EGFRCR 34* 42* 44*  --  50*   CA 9.0 8.2* 8.5*  --  8.3*   ALB 3.6 3.0* 3.2  --  3.0*   * 139 140  --  138   K 5.0 3.4* 3.4*  3.4* 3.9 3.2*    103 106  --  104   CO2 15.0* 27.0 26.0  --  28.0   ALKPHO 135 110  --   --   --    AST 38* 38*  --   --   --    ALT 17 15  --   --   --    BILT 2.2* 1.6*  --   --   --    TP 6.8 5.6*  --   --   --       Recent Labs   Lab 01/12/24 2059 01/13/24  0818 01/13/24  1821 01/14/24  0412 01/15/24  0653   INR 5.24*   < > 3.63* 2.60* 1.60*   PTT 44.1*  --   --   --  42.1*    < > = values in this interval not displayed.        Radiology:  No results found.     Impression and Plan:    Pancytopenia  Acute on chronic anemia   - chronic anemia multifactorial; anemia of chronic disease, renal failure, drug induced (methotrexate) acutely worsening due to GI blood loss in the setting of anticoagulation  - prior BMBx was negative per patient- done in [place] few years ago. no records.   - s/p 1 unit pRBC on 1/13 with  adequate response 7.1--> 9.3--> 8.9   - labs not consistent with hemolysis; low haptoglobin is likely due to recent transfusion, no reticulocytosis, LDH and bili only slightly elevated which is non specific.  - folate, B12 and iron albs ok.   - no recurrent melena or other s/s active bleeding.   - transfuse as needed to keep Hgb > 8 given cardiac disease     Thrombocytopenia  - severe acute thrombocytopenia; admit plt 30's, normal count at baseline  - probably consumptive in the setting of acute illness/sepsis/bleeding vs drug induced vs immune thrombocytopenia   - peripheral smear with no platelet clumps or schistocytes. Labs not consistent with hemolysis of DIC.   - transfuse as needed to maintain plt > 20 or >50 if actively bleeding  - transfuse 2 units platelets tonight to raise count > 50 prior to endoscopy tomorrow   - will consider a trial of steroids after endoscopy for possible ITP    Melena  - presenting with melena x1 week. 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  - s/p 1 FFP and  vitamin K 2 mg IV on 1/13--> INR down to 1.6   - continue holding eliquis       Sepsis  - unclear source. On empiric abx per primary      Rheumatoid arthritis   - on methotrexate > 20 yrs and humira added 2021      Discussed with Dr. Cruz    Will continue to follow        Briana Snyder MD   [facility]

## 2024-01-15 NOTE — PROGRESS NOTES
[facility]    Progress Note    Margaret Silverio Patient Status:  Inpatient    3/14/1945 MRN [id number]   Location [facility] 3W/SW Attending Regan Cruz MD   Hosp Day # 2 PCP Johnathon Rodriguez,          Subjective:     Constitutional:  Negative for fever.   HENT:  Negative for congestion.    Respiratory:  Negative for cough and shortness of breath.    Cardiovascular:  Negative for chest pain.   Gastrointestinal:  Negative for abdominal distention.   Genitourinary:  Negative for flank pain.   Musculoskeletal:  Negative for back pain.   Skin: Negative.    Neurological:  Negative for seizures and headaches.   Psychiatric/Behavioral:  Negative for agitation.      Patient was seen and examined  Comfortable on room air  Denies any chest pain or shortness of breath or cough  No abd pain or n/v/d     Objective:   Blood pressure 142/82, pulse 60, temperature 98 °F (36.7 °C), temperature source Oral, resp. rate 16, height 5' 5\" (1.651 m), weight 153 lb (69.4 kg), SpO2 100%.  Physical Exam  Constitutional:       General: She is not in acute distress.     Appearance: She is ill-appearing.   HENT:      Head: Atraumatic.      Nose: Nose normal.   Eyes:      General: No scleral icterus.  Cardiovascular:      Rate and Rhythm: Normal rate. Rhythm irregular.      Heart sounds:      No gallop.   Pulmonary:      Effort: No respiratory distress.      Breath sounds: No wheezing, rhonchi or rales.   Abdominal:      General: Abdomen is flat. Bowel sounds are normal.      Palpations: Abdomen is soft.      Tenderness: There is no guarding.   Musculoskeletal:      Right lower leg: No edema.      Left lower leg: No edema.   Skin:     General: Skin is dry.   Neurological:      Mental Status: Mental status is at baseline.         Results:   Lab Results   Component Value Date    WBC 2.7 (L) 01/15/2024    HGB 8.8 (L) 01/15/2024    HCT 27.5 (L) 01/15/2024    PLT 32.0 (L) 01/15/2024    CREATSERUM 1.14 (H)  01/15/2024    BUN 16 01/15/2024     01/15/2024    K 3.2 (L) 01/15/2024     01/15/2024    CO2 28.0 01/15/2024    GLU 90 01/15/2024    CA 8.3 (L) 01/15/2024    ALB 3.0 (L) 01/15/2024    ALKPHO 110 01/13/2024    BILT 1.6 (H) 01/13/2024    TP 5.6 (L) 01/13/2024    AST 38 (H) 01/13/2024    ALT 15 01/13/2024    PTT 42.1 (H) 01/15/2024    INR 1.60 (H) 01/15/2024    T4F 1.2 01/12/2024    TSH 19.180 (H) 01/12/2024    LIP 32 01/13/2024    MG 1.4 (L) 01/15/2024    PHOS 2.4 01/15/2024    PHOS 2.4 01/15/2024    TROPHS 32 01/12/2024    B12 >2,000 (H) 01/14/2024       US VENOUS DOPPLER ARM LEFT - DIAG IMG (CPT=93971)    Result Date: 1/14/2024  CONCLUSION: No evidence of left upper extremity DVT.   Dictated by (CST): Maxx Enriquez MD on 1/14/2024 at 12:04 PM     Finalized by (CST): Maxx Enriquez MD on 1/14/2024 at 12:05 PM          XR CHEST AP PORTABLE  (CPT=71045)    Result Date: 1/14/2024  CONCLUSION:   Mild interstitial edema is unchanged.  Small right pleural effusion with right lower lobe airspace opacity which may represent atelectasis or pneumonia is also unchanged.    Dictated by (CST): Kane Garcia MD on 1/14/2024 at 7:58 AM     Finalized by (CST): Kane Garcia MD on 1/14/2024 at 8:00 AM               Assessment & Plan:       1-acute upper GI bleed with acute blood loss anemia  Eliquis on hold GI following  Status post FFP and 1 unit of PRBC transfusion  Hemoglobin stable today      2-s/p hypoxia which now resolved  Chest CT no PE with small basilar effusion / loculated right sided effusion / seems chronic   F/u CXR in am   Covered for possible pneumonia with antibiotics  Currently on room air  Pulmonary status stabilized    3-pancytopenia  Possible related to sepsis  Follow-up    4-status post sepsis and SIRS and lactic acidosis  Hemodynamically stabilized and lactic acid better  Covered with antibiotics    5-acute on chronic CKD    6-A-fib  LVEF 37%  Eliquis on hold  Lasix IV twice daily  Entresto    6-severe  RA with severe joints deformities          Linsey Liao MD  1/15/2024

## 2024-01-15 NOTE — PLAN OF CARE
Problem: Patient Centered Care  Goal: Patient preferences are identified and integrated in the patient's plan of care  Description: Interventions:  - What would you like us to know as we care for you?   - Provide timely, complete, and accurate information to patient/family  - Incorporate patient and family knowledge, values, beliefs, and cultural backgrounds into the planning and delivery of care  - Encourage patient/family to participate in care and decision-making at the level they choose  - Honor patient and family perspectives and choices  Outcome: Progressing     Problem: Patient/Family Goals  Goal: Patient/Family Long Term Goal  Description: Patient's Long Term Goal:     Interventions:  -   - See additional Care Plan goals for specific interventions  Outcome: Progressing  Goal: Patient/Family Short Term Goal  Description: Patient's Short Term Goal:     Interventions:   -   - See additional Care Plan goals for specific interventions  Outcome: Progressing     Problem: RISK FOR INFECTION - ADULT  Goal: Absence of fever/infection during anticipated neutropenic period  Description: INTERVENTIONS  - Monitor WBC  - Administer growth factors as ordered  - Implement neutropenic guidelines  Outcome: Progressing     Problem: SAFETY ADULT - FALL  Goal: Free from fall injury  Description: INTERVENTIONS:  - Assess pt frequently for physical needs  - Identify cognitive and physical deficits and behaviors that affect risk of falls.  - [place] fall precautions as indicated by assessment.  - Educate pt/family on patient safety including physical limitations  - Instruct pt to call for assistance with activity based on assessment  - Modify environment to reduce risk of injury  - Provide assistive devices as appropriate  - Consider OT/PT consult to assist with strengthening/mobility  - Encourage toileting schedule  Outcome: Progressing     Problem: DISCHARGE PLANNING  Goal: Discharge to home or other facility with appropriate  resources  Description: INTERVENTIONS:  - Identify barriers to discharge w/pt and caregiver  - Include patient/family/discharge partner in discharge planning  - Arrange for needed discharge resources and transportation as appropriate  - Identify discharge learning needs (meds, wound care, etc)  - Arrange for interpreters to assist at discharge as needed  - Consider post-discharge preferences of patient/family/discharge partner  - Complete POLST form as appropriate  - Assess patient's ability to be responsible for managing their own health  - Refer to Case Management Department for coordinating discharge planning if the patient needs post-hospital services based on physician/LIP order or complex needs related to functional status, cognitive ability or social support system  Outcome: Progressing     Problem: RESPIRATORY - ADULT  Goal: Achieves optimal ventilation and oxygenation  Description: INTERVENTIONS:  - Assess for changes in respiratory status  - Assess for changes in mentation and behavior  - Position to facilitate oxygenation and minimize respiratory effort  - Oxygen supplementation based on oxygen saturation or ABGs  - Provide Smoking Cessation handout, if applicable  - Encourage broncho-pulmonary hygiene including cough, deep breathe, Incentive Spirometry  - Assess the need for suctioning and perform as needed  - Assess and instruct to report SOB or any respiratory difficulty  - Respiratory Therapy support as indicated  - Manage/alleviate anxiety  - Monitor for signs/symptoms of CO2 retention  Outcome: Progressing     Problem: HEMATOLOGIC - ADULT  Goal: Maintains hematologic stability  Description: INTERVENTIONS  - Assess for signs and symptoms of bleeding or hemorrhage  - Monitor labs and vital signs for trends  - Administer supportive blood products/factors, fluids and medications as ordered and appropriate  - Administer supportive blood products/factors as ordered and appropriate  Outcome:  Progressing  Goal: Free from bleeding injury  Description: (Example usage: patient with low platelets)  INTERVENTIONS:  - Avoid intramuscular injections, enemas and rectal medication administration  - Ensure safe mobilization of patient  - Hold pressure on venipuncture sites to achieve adequate hemostasis  - Assess for signs and symptoms of internal bleeding  - Monitor lab trends  - Patient is to report abnormal signs of bleeding to staff  - Avoid use of toothpicks and dental floss  - Use electric shaver for shaving  - Use soft bristle tooth brush  - Limit straining and forceful nose blowing  Outcome: Progressing     Problem: GASTROINTESTINAL - ADULT  Goal: Maintains or returns to baseline bowel function  Description: INTERVENTIONS:  - Assess bowel function  - Maintain adequate hydration with IV or PO as ordered and tolerated  - Evaluate effectiveness of GI medications  - Encourage mobilization and activity  - Obtain nutritional consult as needed  - Establish a toileting routine/schedule  - Consider collaborating with pharmacy to review patient's medication profile  Outcome: Progressing

## 2024-01-15 NOTE — PROGRESS NOTES
[facility]    Progress Note    Margaret Silverio Patient Status:  Inpatient    3/14/1945 MRN [id number]   Location [facility] 2W/SW Attending Regan Cruz MD   Hosp Day # 2 PCP Johnathon Rodriguez DO     Chief Complaint:   Chief Complaint   Patient presents with    Difficulty Breathing   Diarrhea, black stools.     Subjective:   Margaret Silverio is doing well. No SOB no CP. Cough better. No BM no further bleeding seen. No abd pain     Per RN no events overnight   Objective:   Objective:    Blood pressure 142/82, pulse 60, temperature 98 °F (36.7 °C), temperature source Oral, resp. rate 16, height 5' 5\" (1.651 m), weight 153 lb (69.4 kg), SpO2 100%.    Physical Exam:    General: No acute distress.   Respiratory: Clear to auscultation bilaterally. No wheezes. No rhonchi.  Cardiovascular: S1, S2. Regular rate and rhythm. No rubs or gallops. + 2/6 Systolic M LLSB  Abdomen: Soft, nontender, nondistended.  Positive bowel sounds. No rebound or guarding.  Neurologic: No focal neurological deficits.   Musculoskeletal: Moves all extremities.  Extremities: LUE edema and tenderness near prev IV site      Results:   Results:    Labs:  Recent Labs   Lab 24  2100 24  0242 24  0536 24  0818 24  1358 24  1821 24  0412 01/15/24  0653   WBC  --  2.9*  --  3.0*  --   --  3.9* 3.1* 2.7*   HGB  --  8.6*   < > 7.1*  --  8.5* 9.3* 8.9* 8.8*   MCV  --  101.4*  --  95.3  --   --  90.1 92.9 94.2   PLT  --   --   --  38.0*  --   --  26.0* 35.0* 32.0*   INR 5.24*  --   --   --  5.10*  --  3.63* 2.60* 1.60*    < > = values in this interval not displayed.       Recent Labs   Lab 249 24  0536 24  0412 24  1423 01/15/24  0653   GLU 47* 200* 75  --  90   BUN 27* 26* 20  --  16   CREATSERUM 1.57* 1.32* 1.26*  --  1.14*   CA 9.0 8.2* 8.5*  --  8.3*   ALB 3.6 3.0* 3.2  --  3.0*   * 139 140  --  138   K 5.0 3.4* 3.4*  3.4* 3.9  3.2*    103 106  --  104   CO2 15.0* 27.0 26.0  --  28.0   ALKPHO 135 110  --   --   --    AST 38* 38*  --   --   --    ALT 17 15  --   --   --    BILT 2.2* 1.6*  --   --   --    TP 6.8 5.6*  --   --   --        Estimated Creatinine Clearance: 37.2 mL/min (A) (based on SCr of 1.14 mg/dL (H)).    Recent Labs   Lab 01/13/24  1821 01/14/24  0412 01/15/24  0653   PTP 38.4* 29.4* 20.0*   INR 3.63* 2.60* 1.60*              Culture:  Hospital Encounter on 01/12/24   1. Blood Culture     Status: None (Preliminary result)    Collection Time: 01/12/24  9:33 PM    Specimen: Blood,peripheral   Result Value Ref Range    Blood Culture Result No Growth 2 Days N/A   2. Urine Culture, Routine     Status: None    Collection Time: 01/12/24  8:53 PM    Specimen: Urine, clean catch   Result Value Ref Range    Urine Culture No Growth at 18-24 hrs. N/A       Cardiac  No results for input(s): \"TROP\", \"PBNP\" in the last 168 hours.      Imaging: Imaging data reviewed in Epic.  US VENOUS DOPPLER ARM LEFT - DIAG IMG (CPT=93971)    Result Date: 1/14/2024  CONCLUSION: No evidence of left upper extremity DVT.   Dictated by (CST): Maxx Enriquez MD on 1/14/2024 at 12:04 PM     Finalized by (CST): Maxx Enriquez MD on 1/14/2024 at 12:05 PM          XR CHEST AP PORTABLE  (CPT=71045)    Result Date: 1/14/2024  CONCLUSION:   Mild interstitial edema is unchanged.  Small right pleural effusion with right lower lobe airspace opacity which may represent atelectasis or pneumonia is also unchanged.    Dictated by (CST): Kane Garcia MD on 1/14/2024 at 7:58 AM     Finalized by (CST): Kane Garcia MD on 1/14/2024 at 8:00 AM          XR CHEST AP PORTABLE  (CPT=71045)    Result Date: 1/13/2024  CONCLUSION:  1.  Findings compatible with CHF.  Cardiomegaly, mitral prosthesis and pacing device/ICD.  Vascular congestion, and right pleural effusion. 2.  Post bilateral shoulder arthroplasties. 3.  Right basal pulmonary opacity which could represent secondary  compressive atelectasis however correlate for pneumonia.   Dictated by (CST): Maxx Enriquez MD on 1/13/2024 at 2:20 PM     Finalized by (CST): Maxx Enriquez MD on 1/13/2024 at 2:24 PM           Medications:    potassium phosphate dibasic 15 mmol in sodium chloride 0.9% 250 mL IVPB  15 mmol Intravenous Once    Followed by    potassium chloride  20 mEq Intravenous Once    furosemide  20 mg Intravenous BID (Diuretic)    amitriptyline  25 mg Oral Nightly    carvedilol  25 mg Oral BID    sacubitril-valsartan  1 tablet Oral BID    piperacillin-tazobactam  3.375 g Intravenous Q8H    pantoprazole  40 mg Intravenous Q12H    glucose  15 g Oral Once         Assessment and Plan:   Assessment & Plan:      Acute Upper GI bleed   - Patient with 1 week of diarrhea and melanotic stools PTA  - Hb 7.1 lowest. Trending up now   - GI on consult.   - hold Eliquis.   - transfuse for Hb < 7.0 or PLT < 10K or < 50K with active bleeding   - s/p FFP 1 unit and 1 unit PRBC.   - Will give 2 units PLT today for EGD/Colon tomorrow.   - h/o EGD 04/23' with AVM s/p hemoclip   - NPO after MN>   - Plans for EGD + Colonoscopy tomorrow   - IV PPI BID      Acute blood loss anemia  Chronic anemia   - baseline Hb 9-10 per EMR   - iron panel ok.   - tranfused 1 unit PRBC   - stop IVF  - H/H stable   - hemolysis labs negative.   - Anemia likely multifactorial from blood loss, anemia of chronic inflammation and methotrexate induced.   - Prior BM bx negative per pt   - Folate    LUE edema  - elevate. Hot packs   - US negative for DVT.   - IV infiltrated overnight 1/13     Acute hypoxic respiratory failure   Secondary to CHF and RLL PNA   - off NIPPV. Off o2 NC   - Pulmonary on consult.   - CXR with cardiomegaly and vascular congestion, R pleural effusion and R basal pulm opacity ? PNA  - Lasix 20mg IV BID   - IV zosyn empirically   - blood cx 2/2 NGTD. Genexpert neg,     Acute Pancytopenia   - Likely related to severe sepsis on admit.   - retic ct 13, B12 ok,  folate pending. Hemolysis labs negative.   - ? ITP   - Given PRBC and will give PLT today x 2 units   - Monitor   - HONC on consult   - peripheral smear no morphologic abnormality for PLT.     Severe sepsis   - secondary to PNA   - Lactic acid 12 on admit --> 4.8 will rpt   - blood cx x 2 neg   - CXR possible RLL PNA   - IV zosyn   - CT chest small loculated R and small L pleural effusion no consolidation. CT abd pelvis chronic pancreatitis   - C.diff and GI panel pending   - Ucx negative     GEORGE on CKD III  - Improved.   - Possible ATN from hypoperfusion/anemia vs sepsis  - baseline creat 1.3 per EMR   - Monitor Uop.   - UA with hyaline casts indicative of decreased renal perfusion     A.fib   - hold eliquis   - rate controlled.   - consider eventual outpt watchman with recurrent GI bleed    Acute on Chronic combined CHF   - ECHO from 10/2023 with LVEF 37% grade 4 TR.   - Cards consult.   - Lasix 20mg IV BID   - coreg 25mg BID   - entresto   - daily weights , I/O     Other medical problems  H/o RA  H/o small bowel AVM's        >55min spent, >50% spent counseling and coordinating care in the form of educating pt/family and d/w consultants and staff. Most of the time spent discussing the above plan.        Plan of care discussed with patient or family at bedside.    Regan Cruz MD  Hospitalist          Supplementary Documentation:     Quality:  DVT Prophylaxis: SCD  CODE status: Full  Dispo: per clinical course           Estimated date of discharge: TBD  Discharge is dependent on: clinical stability  At this point Ms. Silverio is expected to be discharge to: home

## 2024-01-15 NOTE — PROGRESS NOTES
[facility]     Gastroenterology Progress Note    Margaret Silverio Patient Status:  Inpatient    3/14/1945 MRN [id number]   Location [facility] 3W/SW Attending Regan Cruz MD   Hosp Day # 2 PCP Johnathon Rodriguez,        Subjective:   Patient feeling well.     Denies abdominal pain, nausea and vomiting. No BM in last day and a half.     Denies CP, SOB, dizziness and light headedness.     Objective:   Blood pressure 142/82, pulse 60, temperature 98 °F (36.7 °C), temperature source Oral, resp. rate 16, height 5' 5\" (1.651 m), weight 153 lb (69.4 kg), SpO2 100%. Body mass index is 25.46 kg/m².    Gen: awake, alert patient, NAD  HEENT: EOMI, the sclera appears anicteric, oropharynx clear, mucus membranes appear moist  CV: RRR  Lung: no conversational dyspnea   Abdomen: soft NTND abdomen with NABS appreciated   Skin: dry, warm, no jaundice  Ext: no LE edema is evident  Neuro: Alert and interactive  Psych: calm, cooperative    Assessment and Plan:   GI bleed-likely recurrent blood loss/AVM.  Her hemoglobin seems to be stable after transfusion we will continue to monitor.  For now she has pancytopenia including low white cell count, hemoglobin and platelets.  Consider multifactorial process such as sepsis like picture.  She is improving.    #anemia  #melena  Patient has not had BM in two days. Prior melena. Discussed possible AVM, PUD for cause of melena. Anemia could also be associated with lower GI bleed. Patient last cln over 8 years prior. Discussed with patient her platelets need to improve in order to procedure with endoscopic evaluation at this time and her INR continues to improve.      Recommend:  -EGD/cln tomorrow pending clinical course  -Hospitalist service for patient's pancytopenia/possible sepsis or other process.  -Continue PPI therapy  -clear liquid diet  -NPO at midnight   -golytely starting at 6 pm    EGD consent: I have discussed the risks, benefits, and  alternatives to upper endoscopy/enteroscopy with the patient/primary decision maker [who demonstrated understanding], including but not limited to the risks of bleeding, infection, pain, death, as well as the risks of anesthesia and perforation all leading to prolonged hospitalization, surgical intervention, or even death. I also specifically mentioned the miss rate of upper endoscopy of 5-10% in the best of all circumstances.  The patient has agreed to sign an informed consent and elected to proceed with procedure with possible intervention [i.e. polypectomy, stent placement, etc.] as indicated.    Colonoscopy consent: I have discussed the risks, benefits, and alternatives to colonoscopy with the patient/primary decision maker [who demonstrated understanding], including but not limited to the risks of bleeding, infection, pain, death, as well as the risks of anesthesia and perforation all leading to prolonged hospitalization, surgical intervention, or even death. I also specifically mentioned the miss rate of colonoscopy of 5-10% in the best of all circumstances. The patient has agreed to sign an informed consent and elected to proceed with colonoscopy with possible intervention [i.e. polypectomy, stent placement, etc.] as indicated.          Case discussed with Danial Mancia MD and Amelie GIL.    Tiffany Reynolds PA-C  [facility]  1/15/2024      Results:     Lab Results   Component Value Date    WBC 2.7 (L) 01/15/2024    HGB 8.8 (L) 01/15/2024    HCT 27.5 (L) 01/15/2024    PLT 32.0 (L) 01/15/2024    CREATSERUM 1.14 (H) 01/15/2024    BUN 16 01/15/2024     01/15/2024    K 3.2 (L) 01/15/2024     01/15/2024    CO2 28.0 01/15/2024    GLU 90 01/15/2024    CA 8.3 (L) 01/15/2024    ALB 3.0 (L) 01/15/2024    ALKPHO 110 01/13/2024    BILT 1.6 (H) 01/13/2024    TP 5.6 (L) 01/13/2024    AST 38 (H) 01/13/2024    ALT 15 01/13/2024    PTT 42.1 (H) 01/15/2024    INR 1.60 (H) 01/15/2024     T4F 1.2 01/12/2024    TSH 19.180 (H) 01/12/2024    LIP 32 01/13/2024    MG 1.4 (L) 01/15/2024    PHOS 2.4 01/15/2024    PHOS 2.4 01/15/2024    B12 >2,000 (H) 01/14/2024       US VENOUS DOPPLER ARM LEFT - DIAG IMG (CPT=93971)    Result Date: 1/14/2024  CONCLUSION: No evidence of left upper extremity DVT.   Dictated by (CST): Maxx Enriquez MD on 1/14/2024 at 12:04 PM     Finalized by (CST): Maxx Enriquez MD on 1/14/2024 at 12:05 PM          XR CHEST AP PORTABLE  (CPT=71045)    Result Date: 1/14/2024  CONCLUSION:   Mild interstitial edema is unchanged.  Small right pleural effusion with right lower lobe airspace opacity which may represent atelectasis or pneumonia is also unchanged.    Dictated by (CST): Kane Garcia MD on 1/14/2024 at 7:58 AM     Finalized by (CST): Kane Garcia MD on 1/14/2024 at 8:00 AM

## 2024-01-15 NOTE — PROGRESS NOTES
Progress Note  Margaret Jonny Silverio Patient Status:  Inpatient    3/14/1945 MRN [id number]   Location [facility] 3W/SW Attending Regan Cruz MD   Hosp Day # 2 PCP Johnathon Rodriguez, DO     Subjective:  Pt denies any chest pain or SOB    Objective:  /80 (BP Location: Right arm)   Pulse 60   Temp 98 °F (36.7 °C)   Resp 18   Ht 5' 5\" (1.651 m)   Wt 153 lb (69.4 kg)   SpO2 99%   BMI 25.46 kg/m²     Telemetry: AV paced      Intake/Output:    Intake/Output Summary (Last 24 hours) at 1/15/2024 0731  Last data filed at 1/15/2024 0536  Gross per 24 hour   Intake 480 ml   Output 1200 ml   Net -720 ml       Last 3 Weights   01/15/24 0536 153 lb (69.4 kg)   24 0119 150 lb 5.7 oz (68.2 kg)   24 140 lb (63.5 kg)   23 1412 145 lb (65.8 kg)   23 0818 145 lb (65.8 kg)       Labs:  Recent Labs   Lab 24  20524  0536 24  0412 24  1423   GLU 47* 200* 75  --    BUN 27* 26* 20  --    CREATSERUM 1.57* 1.32* 1.26*  --    EGFRCR 34* 42* 44*  --    CA 9.0 8.2* 8.5*  --    * 139 140  --    K 5.0 3.4* 3.4*  3.4* 3.9    103 106  --    CO2 15.0* 27.0 26.0  --      Recent Labs   Lab 24  2100 24  0242 24  0536 24  1358 24  1821 24   RBC 2.85*  --  2.32*  --  2.94* 2.96*   HGB 8.6*   < > 7.1* 8.5* 9.3* 8.9*   HCT 28.9*   < > 22.1* 26.4* 26.5* 27.5*   .4*  --  95.3  --  90.1 92.9   MCH 30.2  --  30.6  --  31.6 30.1   MCHC 29.8*  --  32.1  --  35.1 32.4   RDW 17.4*  --  17.0*  --  18.0* 17.9*   NEPRELIM 1.49*  --  2.36  --   --   --    WBC 2.9*  --  3.0*  --  3.9* 3.1*   PLT  --   --  38.0*  --  26.0* 35.0*    < > = values in this interval not displayed.         Recent Labs   Lab 24   TROPHS 32     Lab Results   Component Value Date    INR 2.60 (H) 2024    INR 3.63 (H) 2024    INR 5.10 (HH) 2024       Diagnostics:       Review of Systems   Respiratory: Negative.      Cardiovascular: Negative.        Physical Exam:    Physical Exam  Vitals reviewed.   Constitutional:       General: She is not in acute distress.  HENT:      Head: Normocephalic.   Cardiovascular:      Rate and Rhythm: Normal rate and regular rhythm.      Pulses: Normal pulses.           Dorsalis pedis pulses are 2+ on the right side and 2+ on the left side.      Heart sounds: S1 normal and S2 normal.      Comments: Murmur heard on the right and left of the sternal border  Pulmonary:      Effort: Pulmonary effort is normal.      Breath sounds: Examination of the right-lower field reveals decreased breath sounds. Examination of the left-lower field reveals decreased breath sounds. Decreased breath sounds present.   Abdominal:      General: Abdomen is flat. There is no distension.      Palpations: Abdomen is soft.   Musculoskeletal:      Right lower leg: No edema.      Left lower leg: No edema.      Comments: Contracted wrist joints bilaterally   Skin:     General: Skin is warm and dry.   Neurological:      Mental Status: She is alert and oriented to person, place, and time.   Psychiatric:         Mood and Affect: Mood normal.         Medications:   furosemide  20 mg Intravenous BID (Diuretic)    amitriptyline  25 mg Oral Nightly    carvedilol  25 mg Oral BID    sacubitril-valsartan  1 tablet Oral BID    piperacillin-tazobactam  3.375 g Intravenous Q8H    pantoprazole  40 mg Intravenous Q12H    glucose  15 g Oral Once         Assessment/Plan:    Acute hypoxemic respiratory failure secondary to PNA/CHF    - CT chest on presentation with no PE, small loculated right and small left pleural effusion  - chest xray from 1/14 with small right pleural effusion with right lower lobe airspace opacity which may represent atelectasis or pneumonia  -on Lasix 20mg IV BID  - on IV zosyn  - pulmonary following    Recurrent GI bleed on on long term anticoagulation  - Received PRBC and FFP transfusion during this admission  - Hgb  stable  - Eliquis on hold  - GI following  - consider watchman as long term alternative to anticoagulation    HFrEF  - moderately reduced EF  - last echo on 1/15/24 with LVEF 35-40%  - on entresto, coreg, furosemide  - euvolemic on exam    NICM  S/p ICD 9/2023    PAF  - Apixaban on hold due to GIB    Hypertension: well controlled    Rheumatoid arthritis:  -On methotrexate and humera    Pancytopenia  - Hematology following  - multifactorial  -Hypokalemia and Hypomagnesemia   - potassium of 3.2 and magnesium 1.4    Severe Tricuspid valve regurgitation  -on lasix BID    Mitral valve bioprosthesis  - mean gradient 5mm Hg    Plan:  -continue IV lasix  - replace mag and potassium per protocol  - strict intake and output   - Daily weight        Resmi KODI Tompkins  [facility]  1/15/2024  7:31 AM    Cardiologist addendum:  Patient seen and examined independently.  She does have moderate JVD otherwise no evidence of volume overload, no peripheral edema.  Continue low-dose IV Lasix with electrolyte replacement.  No further cardiac workup needed prior to EGD/colonoscopy planned for tomorrow.  Already on antibiotics, Zosyn should be adequate enough to cover for endocarditis prophylaxis.    L3    Thank you for allowing me to take part in the care of Margaret Silverio. Please call with any questions of concerns.      Dr. Adriana Myers

## 2024-01-15 NOTE — PLAN OF CARE
Pt alert and oriented x4. Pt on room air. IV antibiotics and diuretics per orders. Potassium phosphate replaced per orders. IV team on. Plan for EGD colon tomorrow- npo @ midnight. Diet modified per Gi. Platelets given per orders.   Problem: Patient Centered Care  Goal: Patient preferences are identified and integrated in the patient's plan of care  Description: Interventions:  - What would you like us to know as we care for you? From home with spouse  - Provide timely, complete, and accurate information to patient/family  - Incorporate patient and family knowledge, values, beliefs, and cultural backgrounds into the planning and delivery of care  - Encourage patient/family to participate in care and decision-making at the level they choose  - Honor patient and family perspectives and choices  Outcome: Progressing     Problem: Patient/Family Goals  Goal: Patient/Family Long Term Goal  Description: Patient's Long Term Goal: Discharge home     Interventions:  - monitor vs, I&o, pain per protocol   - See additional Care Plan goals for specific interventions  Outcome: Progressing  Goal: Patient/Family Short Term Goal  Description: Patient's Short Term Goal: manage risk of bleeding    Interventions:   - monitor vs, assess pain, ambulate as tolerated  - See additional Care Plan goals for specific interventions  Outcome: Progressing     Problem: RISK FOR INFECTION - ADULT  Goal: Absence of fever/infection during anticipated neutropenic period  Description: INTERVENTIONS  - Monitor WBC  - Administer growth factors as ordered  - Implement neutropenic guidelines  Outcome: Progressing     Problem: SAFETY ADULT - FALL  Goal: Free from fall injury  Description: INTERVENTIONS:  - Assess pt frequently for physical needs  - Identify cognitive and physical deficits and behaviors that affect risk of falls.  - [place] fall precautions as indicated by assessment.  - Educate pt/family on patient safety including physical limitations  -  Instruct pt to call for assistance with activity based on assessment  - Modify environment to reduce risk of injury  - Provide assistive devices as appropriate  - Consider OT/PT consult to assist with strengthening/mobility  - Encourage toileting schedule  Outcome: Progressing     Problem: DISCHARGE PLANNING  Goal: Discharge to home or other facility with appropriate resources  Description: INTERVENTIONS:  - Identify barriers to discharge w/pt and caregiver  - Include patient/family/discharge partner in discharge planning  - Arrange for needed discharge resources and transportation as appropriate  - Identify discharge learning needs (meds, wound care, etc)  - Arrange for interpreters to assist at discharge as needed  - Consider post-discharge preferences of patient/family/discharge partner  - Complete POLST form as appropriate  - Assess patient's ability to be responsible for managing their own health  - Refer to Case Management Department for coordinating discharge planning if the patient needs post-hospital services based on physician/LIP order or complex needs related to functional status, cognitive ability or social support system  Outcome: Progressing     Problem: RESPIRATORY - ADULT  Goal: Achieves optimal ventilation and oxygenation  Description: INTERVENTIONS:  - Assess for changes in respiratory status  - Assess for changes in mentation and behavior  - Position to facilitate oxygenation and minimize respiratory effort  - Oxygen supplementation based on oxygen saturation or ABGs  - Provide Smoking Cessation handout, if applicable  - Encourage broncho-pulmonary hygiene including cough, deep breathe, Incentive Spirometry  - Assess the need for suctioning and perform as needed  - Assess and instruct to report SOB or any respiratory difficulty  - Respiratory Therapy support as indicated  - Manage/alleviate anxiety  - Monitor for signs/symptoms of CO2 retention  Outcome: Progressing     Problem: HEMATOLOGIC -  ADULT  Goal: Maintains hematologic stability  Description: INTERVENTIONS  - Assess for signs and symptoms of bleeding or hemorrhage  - Monitor labs and vital signs for trends  - Administer supportive blood products/factors, fluids and medications as ordered and appropriate  - Administer supportive blood products/factors as ordered and appropriate  Outcome: Progressing  Goal: Free from bleeding injury  Description: (Example usage: patient with low platelets)  INTERVENTIONS:  - Avoid intramuscular injections, enemas and rectal medication administration  - Ensure safe mobilization of patient  - Hold pressure on venipuncture sites to achieve adequate hemostasis  - Assess for signs and symptoms of internal bleeding  - Monitor lab trends  - Patient is to report abnormal signs of bleeding to staff  - Avoid use of toothpicks and dental floss  - Use electric shaver for shaving  - Use soft bristle tooth brush  - Limit straining and forceful nose blowing  Outcome: Progressing     Problem: GASTROINTESTINAL - ADULT  Goal: Maintains or returns to baseline bowel function  Description: INTERVENTIONS:  - Assess bowel function  - Maintain adequate hydration with IV or PO as ordered and tolerated  - Evaluate effectiveness of GI medications  - Encourage mobilization and activity  - Obtain nutritional consult as needed  - Establish a toileting routine/schedule  - Consider collaborating with pharmacy to review patient's medication profile  Outcome: Progressing

## 2024-01-16 ENCOUNTER — APPOINTMENT (OUTPATIENT)
Dept: GENERAL RADIOLOGY | Facility: HOSPITAL | Age: 79
End: 2024-01-16
Attending: INTERNAL MEDICINE
Payer: MEDICARE

## 2024-01-16 DIAGNOSIS — M51.36 LUMBAR DEGENERATIVE DISC DISEASE: ICD-10-CM

## 2024-01-16 DIAGNOSIS — M54.2 BILATERAL POSTERIOR NECK PAIN: ICD-10-CM

## 2024-01-16 DIAGNOSIS — M05.79 RHEUMATOID ARTHRITIS INVOLVING MULTIPLE SITES WITH POSITIVE RHEUMATOID FACTOR (HCC): ICD-10-CM

## 2024-01-16 PROBLEM — D72.810 LYMPHOPENIA: Status: ACTIVE | Noted: 2024-01-01

## 2024-01-16 PROBLEM — D72.810 LYMPHOPENIA: Status: ACTIVE | Noted: 2024-01-16

## 2024-01-16 LAB
ALBUMIN SERPL-MCNC: 3.3 G/DL (ref 3.2–4.8)
ANION GAP SERPL CALC-SCNC: 6 MMOL/L (ref 0–18)
BUN BLD-MCNC: 13 MG/DL (ref 9–23)
BUN/CREAT SERPL: 10.8 (ref 10–20)
CALCIUM BLD-MCNC: 8.5 MG/DL (ref 8.7–10.4)
CHLORIDE SERPL-SCNC: 102 MMOL/L (ref 98–112)
CO2 SERPL-SCNC: 30 MMOL/L (ref 21–32)
CREAT BLD-MCNC: 1.2 MG/DL
DEPRECATED RDW RBC AUTO: 59.6 FL (ref 35.1–46.3)
EGFRCR SERPLBLD CKD-EPI 2021: 47 ML/MIN/1.73M2 (ref 60–?)
ERYTHROCYTE [DISTWIDTH] IN BLOOD BY AUTOMATED COUNT: 18.2 % (ref 11–15)
GLUCOSE BLD-MCNC: 90 MG/DL (ref 70–99)
GLUCOSE BLDC GLUCOMTR-MCNC: 105 MG/DL (ref 70–99)
HCT VFR BLD AUTO: 25.2 %
HCT VFR BLD AUTO: 29.3 %
HGB BLD-MCNC: 7.9 G/DL
HGB BLD-MCNC: 9.6 G/DL
MAGNESIUM SERPL-MCNC: 1.4 MG/DL (ref 1.6–2.6)
MCH RBC QN AUTO: 31.5 PG (ref 26–34)
MCHC RBC AUTO-ENTMCNC: 32.8 G/DL (ref 31–37)
MCV RBC AUTO: 96.1 FL
OSMOLALITY SERPL CALC.SUM OF ELEC: 286 MOSM/KG (ref 275–295)
PHOSPHATE SERPL-MCNC: 2.7 MG/DL (ref 2.4–5.1)
PHOSPHATE SERPL-MCNC: 2.7 MG/DL (ref 2.4–5.1)
PLATELET # BLD AUTO: 88 10(3)UL (ref 150–450)
POTASSIUM SERPL-SCNC: 3.9 MMOL/L (ref 3.5–5.1)
RBC # BLD AUTO: 3.05 X10(6)UL
SODIUM SERPL-SCNC: 138 MMOL/L (ref 136–145)
WBC # BLD AUTO: 3 X10(3) UL (ref 4–11)

## 2024-01-16 PROCEDURE — 71045 X-RAY EXAM CHEST 1 VIEW: CPT | Performed by: INTERNAL MEDICINE

## 2024-01-16 PROCEDURE — 99233 SBSQ HOSP IP/OBS HIGH 50: CPT | Performed by: STUDENT IN AN ORGANIZED HEALTH CARE EDUCATION/TRAINING PROGRAM

## 2024-01-16 PROCEDURE — 99232 SBSQ HOSP IP/OBS MODERATE 35: CPT | Performed by: INTERNAL MEDICINE

## 2024-01-16 PROCEDURE — 99233 SBSQ HOSP IP/OBS HIGH 50: CPT | Performed by: HOSPITALIST

## 2024-01-16 PROCEDURE — 99233 SBSQ HOSP IP/OBS HIGH 50: CPT | Performed by: INTERNAL MEDICINE

## 2024-01-16 RX ORDER — METOPROLOL TARTRATE 50 MG/1
50 TABLET, FILM COATED ORAL ONCE AS NEEDED
Status: ACTIVE | OUTPATIENT
Start: 2024-01-17 | End: 2024-01-17

## 2024-01-16 RX ORDER — METOPROLOL TARTRATE 50 MG/1
50 TABLET, FILM COATED ORAL ONCE AS NEEDED
Status: DISCONTINUED | OUTPATIENT
Start: 2024-01-17 | End: 2024-01-20

## 2024-01-16 RX ORDER — MAGNESIUM OXIDE 400 MG/1
800 TABLET ORAL ONCE
Status: COMPLETED | OUTPATIENT
Start: 2024-01-16 | End: 2024-01-16

## 2024-01-16 RX ORDER — DILTIAZEM HYDROCHLORIDE 5 MG/ML
5 INJECTION INTRAVENOUS SEE ADMIN INSTRUCTIONS
Status: DISCONTINUED | OUTPATIENT
Start: 2024-01-16 | End: 2024-01-17 | Stop reason: HOSPADM

## 2024-01-16 RX ORDER — METOPROLOL TARTRATE 50 MG/1
50 TABLET, FILM COATED ORAL ONCE
Status: COMPLETED | OUTPATIENT
Start: 2024-01-16 | End: 2024-01-16

## 2024-01-16 RX ORDER — METOPROLOL TARTRATE 100 MG/1
100 TABLET ORAL ONCE AS NEEDED
Status: DISCONTINUED | OUTPATIENT
Start: 2024-01-17 | End: 2024-01-20

## 2024-01-16 RX ORDER — METOPROLOL TARTRATE 100 MG/1
100 TABLET ORAL ONCE
Status: COMPLETED | OUTPATIENT
Start: 2024-01-17 | End: 2024-01-17

## 2024-01-16 RX ORDER — FUROSEMIDE 10 MG/ML
20 INJECTION INTRAMUSCULAR; INTRAVENOUS ONCE
Status: COMPLETED | OUTPATIENT
Start: 2024-01-16 | End: 2024-01-16

## 2024-01-16 RX ORDER — METOPROLOL TARTRATE 50 MG/1
50 TABLET, FILM COATED ORAL ONCE
Status: COMPLETED | OUTPATIENT
Start: 2024-01-17 | End: 2024-01-17

## 2024-01-16 RX ORDER — METOPROLOL TARTRATE 100 MG/1
100 TABLET ORAL ONCE
Status: COMPLETED | OUTPATIENT
Start: 2024-01-16 | End: 2024-01-16

## 2024-01-16 RX ORDER — METOPROLOL TARTRATE 100 MG/1
100 TABLET ORAL ONCE AS NEEDED
Status: ACTIVE | OUTPATIENT
Start: 2024-01-17 | End: 2024-01-17

## 2024-01-16 RX ORDER — HYDROCODONE BITARTRATE AND ACETAMINOPHEN 10; 325 MG/1; MG/1
1 TABLET ORAL EVERY 6 HOURS PRN
Qty: 120 TABLET | Refills: 0 | Status: SHIPPED | OUTPATIENT
Start: 2024-01-16

## 2024-01-16 RX ORDER — METOPROLOL TARTRATE 1 MG/ML
5 INJECTION, SOLUTION INTRAVENOUS SEE ADMIN INSTRUCTIONS
Status: DISCONTINUED | OUTPATIENT
Start: 2024-01-16 | End: 2024-01-17 | Stop reason: HOSPADM

## 2024-01-16 RX ORDER — NITROGLYCERIN 0.4 MG/1
0.4 TABLET SUBLINGUAL ONCE
Status: DISCONTINUED | OUTPATIENT
Start: 2024-01-16 | End: 2024-01-17 | Stop reason: HOSPADM

## 2024-01-16 NOTE — CONSULTS
[facility] - ELECTROPHYSIOLOGY CONSULT NOTE    Margaret Silverio Patient Status:  Inpatient    3/14/1945 MRN [id number]   Location [facility] 3W/SW Attending Rashaad Nicholson MD   Hosp Day # 3 PCP Johnathon Rodriguez DO     Date of Admission:  2024  Date of Consult:  2024  I was asked by general cardiology, Dr. Lr, and Rashaad Nicholson MD to provide recommendations for evaluation and management of EP rhythm issues.  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    Previously in the office patient had been previously seen for consideration of watchman at the time she continued taking anticoagulation despite history of severe bleeding.  Her ejection fraction has continued at 33% despite goal-directed medical therapy greater than 90 days.  She is class II heart failure and a narrow QRS.  She does have paroxysmal atrial fibrillation a long MO.  EKG 2 months ago with sinus rhythm  current one looks probably sinus with very long MS with ectopy.  She has shortness of breath it 2 flights of stairs and 2 blocks but no PND orthopnea noted.    Patient has a history of possible TIA although she says it was a seizure back in November 2022 also had a GI bleed in April 2023 but is back on her Eliquis but with history of these issues high risk for anticoagulation would consider for watchman.  Talked about some of these issues she reported that she is doing fine on the blood thinners at this point is rather vague with a history with a GI bleed that required her to be in the ICU April 2023 also vague about the possible TIA history and was not sure about that back in November 2022 with or without was just a seizure.  Does have history of mitral valve replacement and EF has been fluctuating and last dropped about 35% and medications were adjusted      Cardiac tests:  Chest CT no PE  Chest x-ray small pleural effusion  Echocardiogram EF 38% January 15, 2024  Past Medical History  Past Medical History:   Diagnosis Date    Anemia     Arrhythmia     Arthritis     Essential hypertension     High blood pressure     Seizure disorder (HCC)     Seizures (HCC)        Past Surgical History  Past Surgical History:   Procedure Laterality Date    OTHER SURGICAL HISTORY  2017    Heart Surgery     OTHER SURGICAL HISTORY  2020    Bilateral shoulder replacement        Family History  No family history on file.    Social History  Pediatric History   Patient Parents    Not on file     Other Topics Concern    Not on file   Social History Narrative    Not on file           Current Medications:  Current Facility-Administered Medications   Medication Dose Route Frequency    furosemide (Lasix) 10 mg/mL injection 20 mg  20 mg Intravenous BID (Diuretic)    amitriptyline (Elavil) tab 25 mg  25 mg Oral Nightly    carvedilol (Coreg) tab 25 mg  25 mg Oral BID    sacubitril-valsartan (Entresto) 24-26 MG per tab 1 tablet  1 tablet Oral BID     ondansetron (Zofran) 4 MG/2ML injection 4 mg  4 mg Intravenous Q6H PRN    piperacillin-tazobactam (Zosyn) 3.375 g in dextrose 5% 100 mL IVPB-ADDV  3.375 g Intravenous Q8H    pantoprazole (Protonix) 40 mg in sodium chloride 0.9% PF 10 mL IV push  40 mg Intravenous Q12H    HYDROcodone-acetaminophen (Norco) 5-325 MG per tab 1 tablet  1 tablet Oral Q6H PRN     Medications Prior to Admission   Medication Sig    HYDROcodone-acetaminophen  MG Oral Tab Take 1 tablet by mouth every 8 (eight) hours as needed for Pain.    apixaban 5 MG Oral Tab Take 1 tablet (5 mg total) by mouth 2 (two) times daily.    AMITRIPTYLINE 25 MG Oral Tab TAKE 1 TABLET(25 MG) BY MOUTH EVERY NIGHT    methotrexate 2.5 MG Oral Tab TAKE 6 TABLETS BY MOUTH 1 TIME A WEEK    alendronate 70 MG Oral Tab Take 1 tablet (70 mg total) by mouth once a week.    ENTRESTO 24-26 MG Oral Tab Take 1 tablet by mouth 2 (two) times daily.    pantoprazole 40 MG Oral Tab EC Take 1 tablet (40 mg total) by mouth daily.    folic acid 800 MCG Oral Tab Take 1 tablet (800 mcg total) by mouth daily.    pregabalin 75 MG Oral Cap Take 1 capsule (75 mg total) by mouth 3 (three) times daily. (Patient not taking: Reported on 1/14/2024)    furosemide 20 MG Oral Tab Take 0.5 tablets (10 mg total) by mouth daily.    ferrous sulfate 325 (65 FE) MG Oral Tab EC Take 1 tablet (325 mg total) by mouth daily with breakfast.    carvedilol 25 MG Oral Tab Take 1 tablet (25 mg total) by mouth 2 (two) times daily.       Allergies  Allergies   Allergen Reactions    Lisinopril Coughing       Review of Systems:   10 pt ROS performed, separate from HPI  Review of Systems:  GENERAL: no fevers, chills, sweats  HEENT: no visual or hearing changes  SKIN: denies any unusual skin lesions or rashes  RESPIRATORY: denies shortness of breath with exertion  CARDIOVASCULAR: no active chest pain, no claudication  GI: denies abdominal pain and denies heartburn  : no dysuria or hematuria  NEURO: denies  headaches, focal weaknesses or paresthesias  All other systems reviewed and negative.  fatigue is present  All other systems were reviewed are negative  Physical Exam:   Blood pressure 145/90, pulse 60, temperature 98 °F (36.7 °C), temperature source Oral, resp. rate 18, height 5' 5\" (1.651 m), weight 151 lb (68.5 kg), SpO2 91%.    Scheduled Meds:    furosemide  20 mg Intravenous BID (Diuretic)    amitriptyline  25 mg Oral Nightly    carvedilol  25 mg Oral BID    sacubitril-valsartan  1 tablet Oral BID    piperacillin-tazobactam  3.375 g Intravenous Q8H    pantoprazole  40 mg Intravenous Q12H         Physical Exam:    General: Alert and oriented. No apparent distress. No respiratory or constitutional distress.  HEENT: Normocephalic, anicteric sclera, neck supple  Neck: No JVD, carotids 2+, supple  Cardiac: Regular rate. No pathologic murmur.  Lungs: Clear with normal effort.  Normal excursions and effort.  Abdomen: Soft, non-tender. BS-present.  Extremities: Without clubbing, cyanosis.  Peripheral pulsespresent.  Neurologic: Alert and oriented, normal affect. Motor ok.  Skin: Warm and dry.     Results:     Laboratory Data:  Lab Results   Component Value Date    WBC 3.0 (L) 01/16/2024    HGB 9.6 (L) 01/16/2024    HCT 29.3 (L) 01/16/2024    PLT 88.0 (L) 01/16/2024    CREATSERUM 1.20 (H) 01/16/2024    BUN 13 01/16/2024     01/16/2024    K 3.9 01/16/2024     01/16/2024    CO2 30.0 01/16/2024    GLU 90 01/16/2024    CA 8.5 (L) 01/16/2024    ALB 3.3 01/16/2024    ALKPHO 110 01/13/2024    TP 5.6 (L) 01/13/2024    AST 38 (H) 01/13/2024    ALT 15 01/13/2024    PTT 42.1 (H) 01/15/2024    INR 1.60 (H) 01/15/2024    PTP 20.0 (H) 01/15/2024    T4F 1.2 01/12/2024    TSH 19.180 (H) 01/12/2024    LIP 32 01/13/2024    MG 1.4 (L) 01/16/2024    PHOS 2.7 01/16/2024    PHOS 2.7 01/16/2024    B12 >2,000 (H) 01/14/2024         Imaging: I independently visualized all relevant chest imaging in PACS, agree with radiology  interpretation except where noted.  Thank you for allowing me to participate in the care of your patient.    Luis Sargent MD  [facility]  Cardiac Electrophysiology  1/16/2024    5 level EP Consult

## 2024-01-16 NOTE — PLAN OF CARE
Platelets and potassium replaced overnight. Norco administered for pain. Plan IV lasix, IV zosyn, EGD/colonoscopy today.

## 2024-01-16 NOTE — PAYOR COMM NOTE
--------------  ADMISSION REVIEW     Payor: [employer]  Subscriber #:  [id number]  Authorization Number: [id number]    Admit date: 1/13/24  Admit time: 12:44 AM       REVIEW DOCUMENTATION:    Patient Seen in: [facility] Emergency Department    History     Chief Complaint   Patient presents with    Difficulty Breathing     Stated Complaint: ANSELMO     HPI    78-year-old female with past history of atrial flutter on Eliquis hypertension, seizure disorder presenting by EMS from home with shortness of breath is noted over the past week associated with diarrhea and dark/black component to same.  No abdominal pain.  No fevers or chills.  No chest pain or shortness of breath, no cough.  EMS noting tachypnea without overt hypoxia for which supplemental oxygen initiated and capnography noted to be 15.  EMR reviewed, 9/8/2023 demonstrating 37% ejection fraction with decreased RV systolic function; 4/2023 EGD with duodenal bulb ersoin/avm and hiatal hernia. Patient oriented to person/hospital/year though seemingly slow to respond.    Positive for stated complaint: ANSELMO  Other systems are as noted in HPI.  Constitutional and vital signs reviewed.      All other systems reviewed and negative except as noted above.    PSFH elements reviewed from today and agreed except as otherwise stated in HPI.    Physical Exam     ED Triage Vitals   BP 01/12/24 2034 (!) 142/106   Pulse 01/12/24 2034 64   Resp 01/12/24 2034 (!) 28   Temp 01/12/24 2042 (!) 95.7 °F (35.4 °C)   Temp src 01/12/24 2042 Rectal   SpO2 01/12/24 2034 98 %   O2 Device 01/12/24 2034 None (Room air)       Physical Exam   Constitutional: Chronically ill-appearing, tachypneic.  Pulmonary/Chest: Tachypneic.    ED Course     Labs Reviewed   COMP METABOLIC PANEL (14) - Abnormal; Notable for the following components:       Result Value    Glucose 47 (*)     Sodium 134 (*)     CO2 15.0 (*)     Anion Gap 19 (*)     BUN 27 (*)     Creatinine 1.57 (*)     eGFR-Cr  34 (*)     AST 38 (*)     Bilirubin, Total 2.2 (*)     All other components within normal limits   EXPANDED BLOOD GAS, ARTERIAL - Abnormal; Notable for the following components:    ABG pH 7.33 (*)     ABG pCO2 21 (*)     Sodium Blood Gas 130 (*)     Lactic Acid (Blood Gas) 14.3 (*)     Total Hemoglobin 8.9 (*)     Oxygen Content 12.1 (*)     All other components within normal limits   AMMONIA, PLASMA - Abnormal; Notable for the following components:    Ammonia 35 (*)     All other components within normal limits   URINALYSIS, ROUTINE - Abnormal; Notable for the following components:    Protein Urine 50 (*)     Urobilinogen Urine 4 (*)     Squamous Epi. Cells Few (*)     Hyaline Casts Present (*)     All other components within normal limits   TSH W REFLEX TO FREE T4 - Abnormal; Notable for the following components:    TSH 19.180 (*)     All other components within normal limits   PROTHROMBIN TIME (PT) - Abnormal; Notable for the following components:    PT 51.4 (*)     INR 5.24 (*)     All other components within normal limits   PTT, ACTIVATED - Abnormal; Notable for the following components:    PTT 44.1 (*)     All other components within normal limits   LACTIC ACID, PLASMA - Abnormal; Notable for the following components:    Lactic Acid 12.0 (*)     All other components within normal limits   PROCALCITONIN - Abnormal; Notable for the following components:    Procalcitonin 0.61 (*)     All other components within normal limits   LACTIC ACID REFLEX POST POSTIVE - Abnormal; Notable for the following components:    Lactic Acid 6.5 (*)     All other components within normal limits   POCT GLUCOSE - Abnormal; Notable for the following components:    POC Glucose  55 (*)     All other components within normal limits   POCT GLUCOSE - Abnormal; Notable for the following components:    POC Glucose  49 (*)     All other components within normal limits   POCT GLUCOSE - Abnormal; Notable for the following components:    POC  Glucose  58 (*)     All other components within normal limits   POCT GLUCOSE - Abnormal; Notable for the following components:    POC Glucose  127 (*)     All other components within normal limits   POCT GLUCOSE - Abnormal; Notable for the following components:    POC Glucose  218 (*)     All other components within normal limits   POCT GLUCOSE - Abnormal; Notable for the following components:    POC Glucose  246 (*)     All other components within normal limits   CBC W/ DIFFERENTIAL - Abnormal; Notable for the following components:    WBC 2.9 (*)     RBC 2.85 (*)     HGB 8.6 (*)     HCT 28.9 (*)     .4 (*)     MCHC 29.8 (*)     RDW-SD 62.8 (*)     RDW 17.4 (*)     Neutrophil Absolute Prelim 1.49 (*)     Neutrophil Absolute 1.49 (*)     Monocyte Absolute 0.02 (*)      ------------------------------------------------------------  Time: 01/12 2058  Comment: ABG noted, will initiate sepsis bolus/bicarb drip and empiric antibiotics pending remainder of workup.  ------------------------------------------------------------  Time: 01/12 2154  Comment: Glycemic and respiratory status improving, patient clinically more awake/alert/appropriate and conversational.  ------------------------------------------------------------  Time: 01/12 2213  Comment: /daughter to bedside, questionable cough/vomiting without overt pain; no sick contacts/recent travel.     Lungs:   Right: Rhonchi  Lef: Diminished  Merritt Queen MD1/12/202410:49 PM    Medical Decision Making  Evaluation for 1 week of progressive shortness of breath associated with dark diarrhea without overt pain or sick contacts/recent travel and without recent antibiotics.  Abdomen soft/nontender with concern for upper gastrointestinal hemorrhage given dark guaiac positive stools which to units packed red blood cells crossmatched and PPI initiated; tachypnea noted without overt hypoxia with concern for possible metabolic process - ABG with stable hemoglobin and  lactic acidosis noted for which sepsis bolus/blood cultures and bicarb drip/zosyn initiated. CXR as noted with zosyn ongoing, hypoglycemia/hypothermia noted with thyroid studies noted and oral dextrose/IM glucagon initiated pending IV access and thereafter with IV D50/D5 infusion inititaited in addition to obdulio hugger. CT imaging as noted, case d/w hospitalist coverage Guillermina Harris/Marco Antonio for admission and GI/critical care coverage Guillermina Mckeon/Issa in consultation; given initial hypothermia/hypoglycemia empiric hydrocortisone initiated pending labs, given concern for UGIB with INR as noted, FFP initiated.      Disposition and Plan     Clinical Impression:  1. SIRS (systemic inflammatory response syndrome) (HCC)    2. Dyspnea, unspecified type    3. Hypothermia, initial encounter    4. Melena    5. Anticoagulated    6. Lactic acidosis    7. Coagulopathy (HCC)    8. Hypoglycemia            S  [facility]    History & Physical      Date:  1/12/2024  Date of Admission:  1/12/2024    Chief Complaint:  Chief Complaint   Patient presents with    Difficulty Breathing       History of Present Illness:  Margaret Silverio is a(n) 78 year old female, with a past medical history significant for atrial fibrillation, currently on Eliquis, hypertension, chronic systolic heart failure and status post pacemaker placement earlier in September was brought to the ER with increasing shortness of breath fatigue and melanotic stools over the last week.  Some mention of abdominal pain earlier in the day.  Patient currently on BiPAP, difficult to understand however denying any pain, claims her breathing is much improved.  No nausea vomiting or chest pain    History:  Past Medical History:   Diagnosis Date    Anemia     Arrhythmia     Arthritis     Essential hypertension     High blood pressure     Seizure disorder (HCC)     Seizures (HCC)      Past Surgical History:   Procedure Laterality Date    OTHER SURGICAL HISTORY   2017    Heart Surgery     OTHER SURGICAL HISTORY  2020    Bilateral shoulder replacement      Family history: No sick contacts   reports that she has never smoked. She has never used smokeless tobacco. She reports that she does not drink alcohol and does not use drugs.    Allergies:  Allergies   Allergen Reactions    Lisinopril Coughing       Home Medications:  Prior to Admission Medications   Prescriptions Last Dose Informant Patient Reported? Taking?   AMITRIPTYLINE 25 MG Oral Tab   No No   Sig: TAKE 1 TABLET(25 MG) BY MOUTH EVERY NIGHT   ENTRESTO 24-26 MG Oral Tab   No No   Sig: Take 1 tablet by mouth 2 (two) times daily.   HYDROcodone-acetaminophen (NORCO)  MG Oral Tab   No No   Sig: Take 1 tablet by mouth every 6 (six) hours as needed for Pain.   HYDROcodone-acetaminophen  MG Oral Tab   No No   Sig: Take 1 tablet by mouth every 8 (eight) hours as needed for Pain.   alendronate 70 MG Oral Tab   No No   Sig: Take 1 tablet (70 mg total) by mouth once a week.   apixaban 5 MG Oral Tab   Yes No   Sig: Take 1 tablet (5 mg total) by mouth 2 (two) times daily.   carvedilol 25 MG Oral Tab   No No   Sig: Take 1 tablet (25 mg total) by mouth 2 (two) times daily.   ferrous sulfate 325 (65 FE) MG Oral Tab EC   Yes No   Sig: Take 1 tablet (325 mg total) by mouth daily with breakfast.   folic acid 800 MCG Oral Tab   No No   Sig: Take 1 tablet (800 mcg total) by mouth daily.   furosemide 20 MG Oral Tab   No No   Sig: Take 0.5 tablets (10 mg total) by mouth daily.   methotrexate 2.5 MG Oral Tab   No No   Sig: TAKE 6 TABLETS BY MOUTH 1 TIME A WEEK   pantoprazole 40 MG Oral Tab EC   No No   Sig: Take 1 tablet (40 mg total) by mouth daily.   pregabalin 75 MG Oral Cap   No No   Sig: Take 1 capsule (75 mg total) by mouth 3 (three) times daily.      Facility-Administered Medications: None       Review of Systems:  Constitutional:  Weakness, Fatigue.  Gastrointestinal: Dark watery stool    Physical Exam:  Temp:  [95.7 °F  (35.4 °C)] 95.7 °F (35.4 °C)  Pulse:  [59-64] 59  Resp:  [22-28] 24  BP: (132-142)/() 132/77  SpO2:  [96 %-100 %] 100 %    General:  Alert and oriented.  Neurologic:  Alert, oriented, no focal deficits, cranial nerves II-XII are grossly intact.  Cognition and Speech: Difficult to understand at this time    Assessment and Plan:    GI bleed  GI consulted, patient started Protonix 40 mg IV twice daily.  Eliquis on hold for now.    Acute blood loss anemia  Secondary to GI loss, monitor closely will transfuse if hemoglobin less than 7 or acute bleeding.  Repeat H&H in 6 hours.  GI has been consulted    Acute respiratory failure with hypoxia  Unclear etiology at this time, CT scan suggestive of pleural effusion, pulmonary consulted.  Currently on BiPAP, will wean as tolerated.    Sepsis with lactic acidosis  Unclear source of sepsis at this time, patient started on Zosyn 3.375 g IV piggyback every 8 hours, cultures pending, lactic acidosis could be blood loss related, IV fluids initiated, monitor closely, continue serial lactate levels.    GEORGE with possible ATN  Likely sepsis related, as stated previously IV fluids initiated, monitor I's and O's.  Avoid nephrotoxic medications.    Atrial fibrillation  Rate controlled, hold Eliquis for now considering GI bleed    Chronic combined heart failure  Currently no signs of fluid overload, initiate IV fluids, will monitor I's and O's and daily weights.    Prophylaxis  SCDs, heparin Eliquis on hold considering GI bleed    CODE STATUS  Full    Primary care physician  Johnathon Rodriguez DO    MDM: High, severe exacerbation of chronic illness posing threat to life.  IV medications requiring close inpatient monitoring  75 minutes spent on this admission - examining patient, obtaining history, reviewing previous medical records, going over test results/imaging and discussing plan of care. All questions answered.     Disposition  Clinical course will dictate outcome      COLON  KATERINE GERMAN MD  1/12/2024  10:01 PM        Pulmonary 1/12/24    Patient is a 78-year-old female with past medical history significant for atrial fibrillation on anticoagulation with Eliquis, systolic heart failure, hypertension who presented with chief complaint of worsening dyspnea some associated fatigue and melena over the last week.  Currently on 5 L nasal cannula oxygen.  Had been on BiPAP earlier.  Still improved.  Patient improvement in dyspnea noted during hospital course.  Occasional cough present.  Denies nausea vomiting abdominal pain.  No significant episodes of melena noted during hospitalization.         Imaging  CT CHEST PE AORTA (IV ONLY) (CPT=71260)     Result Date: 1/13/2024  CONCLUSION:         1. No acute pulmonary embolus through segmental level. 2. Small to moderate loculated right pleural effusion and small left pleural effusion. 3. Mitral valve prosthesis. 4. Marked coronary artery calcification. 5. Biatrial enlargement.  Reflux of contrast from right atrium into dilated IVC and hepatic veins reflecting elevated right heart pressure. 6. Mild emphysema. 7. Small amount of ascites.    Dictated by (CST): Jesus Jackson MD on 1/13/2024 at 6:44 AM     Finalized by (CST): Jesus Jackson MD on 1/13/2024 at 6:56 AM           CT ABDOMEN PELVIS IV CONTRAST, NO ORAL (ER)     Result Date: 1/13/2024  CONCLUSION:         1. Chronic pancreatitis.  Correlation with pancreatic enzymes recommended. 2. High density material in the gallbladder could be secondary to a previous procedure, or high density sludge/bile. 3. Generalized atherosclerosis with chronically displaced intimal calcification infrarenal aorta.  Coronary artery calcification. 4. Occluded right external iliac artery with reconstitution of a diseased right common femoral artery. 5. Small to moderate loculated right pleural effusion and small left pleural effusion.  Anasarca.  Small amount of ascites. 6. Please see separate chest CT report.     Dictated by (CST): Jesus Jackson MD on 1/13/2024 at 6:19 AM     Finalized by (CST): Jesus Jackson MD on 1/13/2024 at 6:36 AM           XR CHEST AP PORTABLE  (CPT=71045)     Result Date: 1/12/2024  CONCLUSION:          Increased opacity in the right mid to lower lung which may reflect combination of pleural effusion which may be partially loculated and parenchymal opacity which could reflect atelectasis or pneumonia.  Trace suspected left pleural effusion with mild left basilar opacity which also could reflect atelectasis or pneumonia.    Dictated by (CST): Azar Mendez MD on 1/12/2024 at 10:06 PM     Finalized by (CST): Azar Mendez MD on 1/12/2024 at 10:07 PM            Assessment   1.  Acute hypoxemic respiratory failure  2.  Possible GI bleed  3.  Pancytopenia  4.  Lactic acidosis  5.  Acute kidney injury  6.  Hypokalemia  7.  Atrial fibrillation  8.  CHF  9.  Coagulopathy     Plan   -Patient presents with evidence of fatigue, dyspnea and concern for melanotic stools over the last several days  -Oxygenation requirements improved during hospital course.  Weaned off BiPAP  -CT chest on presentation with no pulmonary embolism seen.  Small loculated right and small left pleural effusion seen.  No significant consolidation seen  -CT abdomen pelvis on presentation with findings consistent with chronic pancreatitis  -Empiric IV antibiotic therapy at this time.  -Transfuse for hemoglobin below 7.   -Discussed with GI.  Plan for endoscopic evaluation when better optimized.  Continue PPI therapy at this time  -Received FFP earlier emergency department.  Repeat INR 5.10.  Vitamin K ordered.  -Electrolyte repletion  -Closely monitor renal function and output  -Hold anticoagulation at this time  -Reviewed vitals, labs and imaging     Augustin Parsons DO  Pulmonary Critical Care Medicine      1/13/24 Hospitalist     Difficulty Breathing   Diarrhea, black stools.      Subjective:   Margaret Silverio is feeling  better. She has been coughing x 1 week nonproductive no F/C no CP. She has also had melena x 1 week loose stools not liquid. No abd pain. Last week had vomiting none last night   She is off bipap on 4L NC o2 with spo2 100%.       Acute Upper GI bleed   - Patient with 1 week of diarrhea and melanotic stools.   - Hb 7.1 lowest.   - GI on consult.   - hold Eliquis.   - transfuse for Hb < 7.0 or PLT < 10K or < 50K with active bleeding   - s/p FFP 1 unit. 1 unit PRBC   - h/o EGD 04/23' with AVM s/p hemoclip   - Eventually will need endoscopic evaluation however would wait till cardioresp status more stable.   - IV PPI BID   - stop IVF.      Acute blood loss anemia  - baseline Hb 9-10 per EMR   - iron panel ok.   - tranfused 1 unit PRBC   - stop IVF     Acute hypoxic respiratory failure   Secondary to CHF and possible RLL PNA   - off NIPPV   - Pulmonary on consult.   - on 5L NC o2 - wean.   - CXR with cardiomegaly and vascular congestion, R pleural effusion and R basal pulm opacity ? PNA  - Lasix 20mg IV x 1   - IV zosyn empirically     Pancytopenia   - Check B12   - rpt labs in AM   - Transfuse 1 unit PRBC.   - Check CBC in evening      Severe sepsis   - secondary to PNA   - Lactic acid 12 on admit --> trending down  - blood cx x 2 pending   - CXR possible RLL PNA   - IV zosyn   - CT chest small loculated R and small L pleural effusion no consolidation. CT abd pelvis chronic pancreatitis   - C.diff and GI panel pending   - Ucx negative      GEORGE on CKD III  - Possible ATN from hypoperfusion/anemia vs sepsis  - baseline creat 1.3 per EMR   - Monitor Uop.   - UA with hyaline casts indicative of decreased renal perfusion      A.fib   - hold eliquis   - rate controlled.      Acute on Chronic combined CHF   - ECHO from 10/2023 with LVEF 37% grade 4 TR.   - Cards consult.   - Lasix 20mg IV x 1  - daily weights , I/O   - hold entresto due to GEORGE.   - coreg 25mg BID      Other medical problems  H/o RA  H/o small bowel  AVM's      Hematology 1/14/24    Reason for Consultation: Pancytopenia     History of Present Illness:  Margaret Silverio is a 78 year old Black or  female history of HTN, CHF/NICM s/p ICD 9/2023, A fib on anticoagulation with eliquis, rheumatoid arthritis on methotrexate, osteoporosis, UGI bleed, seizure disorder, presented to ED on 1/12/24 with worsening dyspnea, fatigue and melena x1 week, found to be hypoxic with  Hgb 8.6-->7.3, platelets 38, WBC 2.9, INR 5.1,  lactic acid 12 admitted for management of presumed sepsis and GI bleed. CT imaging significant for chronic pancreatitis, otherwise no acute process. She was placed on BiPAP, started on broad spectrum antibiotics, received 1 pRBC, 1 FFP and vitamin K. Eliquis held. Hematology consulted for further evaluation.   Patient has long history of anemia for many years, seen by hematology at [place] few years ago and underwent a bone marrow biopsy that was unremarkable per patient. No records available to my review. Her anemia was attributed to chronic inflammation and medication effects. Also has h/o GI bleed from duodenal AVM based on EGD in 4/2023. No history of liver disease or alcohol use.  No recent illness or infections. No new medications.   She reports extreme weakness and fatigue and poor po intake x4 days prior to admission. Stated she feels better today, transferred out of ICU, comfortable on room air. Hgb stable. No bowel movements since admission.      XR CHEST AP PORTABLE  (CPT=71045)     Result Date: 1/14/2024  CONCLUSION:   Mild interstitial edema is unchanged.  Small right pleural effusion with right lower lobe airspace opacity which may represent atelectasis or pneumonia is also unchanged.    Dictated by (CST): Kane Garcia MD on 1/14/2024 at 7:58 AM     Finalized by (CST): Kane Garcia MD on 1/14/2024 at 8:00 AM        Impression and Plan:       Pancytopenia  Acute on chronic anemia   - chronic anemia multifactorial; anemia  of chronic inflammation, renal failure, drug induced (methotrexate) acutely worsening due to GI blood loss in the setting of anticoagulation  - prior BMBx was negative per patient- done in [place] few years ago.   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 No BM since admission.   - continue holding AC     Coagulopathy  - admit INR 5.1 likely secondary to DOAC and vit K deficiency from poor nutrition  - no history of liver disease or alcohol use  - s/p 1 FFP and  vitamin K 2 mg IV on 1/13--> INR down to 2.6  - check PTT, fibrinogen, monitor INR daily  - continue holding eliquis      Sepsis  - on IVF and empiric abx per primary      Rheumatoid arthritis   - on methotrexate > 20 yrs and humira added 2021 1/14/24 Pulmonary   Wait blood and urine culture results  -Transfuse for hemoglobin below 7.   -Discussed with GI.  Plan for possible endoscopic evaluation when better optimized.  Continue PPI therapy at this time  -50 FFP and vitamin K earlier.  -Diuresis as tolerated    1/14/24 GI    er hemoglobin seems to be stable after transfusion we will continue to monitor.  For now she has pancytopenia including low white cell count, hemoglobin and platelets.  Consider multifactorial process such as sepsis like picture.  She is improving.     We discussed upper endoscopy, timing to depend on  clinical picture.  Her INR still elevated at 2.6 and her platelet count remains in the 3000 range.  Will wait for improvement.     Recommend:  -EGD on above parameters are improving.  Emergently if evidence of active bleeding.  -Hospitalist service for patient's pancytopenia/possible sepsis or other process.  -Continue PPI therapy  -Advance diet as tolerated      1/15/24 Hospitalist     Will give 2 units PLT today for EGD/Colon tomorrow.   - h/o EGD 04/23' with AVM s/p hemoclip   - NPO after MN>   - Plans for EGD + Colonoscopy tomorrow     Acute Pancytopenia   - Likely related to severe sepsis on admit.   - retic ct 13, B12 ok, folate pending. Hemolysis labs negative.   - ? ITP   - Given PRBC and will give PLT today x 2 units   - Monitor   - HONC on consult   - peripheral smear no morphologic abnormality for PLT.     Hematology 1/15/24    Pancytopenia  Acute on chronic anemia   - chronic anemia multifactorial; anemia of chronic disease, renal failure, drug induced (methotrexate) acutely worsening due to GI blood loss in the setting of anticoagulation  - prior BMBx was negative per patient- done in [place] few years ago. no records.   - s/p 1 unit pRBC on 1/13 with adequate response 7.1--> 9.3--> 8.9   - labs not consistent with hemolysis; low haptoglobin is likely due to recent transfusion, no reticulocytosis, LDH and bili only slightly elevated which is non specific.  - folate, B12 and iron albs ok.   - no recurrent melena or other s/s active bleeding.   - transfuse as needed to keep Hgb > 8 given cardiac disease     Thrombocytopenia  - severe acute thrombocytopenia; admit plt 30's, normal count at baseline  - probably consumptive in the setting of acute illness/sepsis/bleeding vs drug induced vs immune thrombocytopenia   - peripheral smear with no platelet clumps or schistocytes. Labs not consistent with hemolysis of DIC.   - transfuse as needed to maintain plt > 20 or >50 if actively bleeding  - transfuse 2  units platelets tonight to raise count > 50 prior to endoscopy tomorrow   - will consider a trial of steroids after endoscopy for possible ITP     Melena  - presenting with melena x1 week. 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  - s/p 1 FFP and  vitamin K 2 mg IV on 1/13--> INR down to 1.6       Pulmonary 1/15/24    -acute upper GI bleed with acute blood loss anemia  Eliquis on hold GI following  Status post FFP and 1 unit of PRBC transfusion  Hemoglobin stable today        2-s/p hypoxia which now resolved  Chest CT no PE with small basilar effusion / loculated right sided effusion / seems chronic   F/u CXR in am   Covered for possible pneumonia with antibiotics  Currently on room air  Pulmonary status stabilized     3-pancytopenia  Possible related to sepsis  Follow-up     4-status post sepsis and SIRS and lactic acidosis  Hemodynamically stabilized and lactic acid better  Covered with antibiotics     5-acute on chronic CKD     6-A-fib  LVEF 37%  Eliquis on hold  Lasix IV twice daily  Entresto     6-severe RA with severe joints deformities    Laboratory      Latest Reference Range & Units 01/12/24 20:51   ABG PH 7.35 - 7.45  7.33 (L)   ABG PCO2 35 - 45 mm Hg 21 (L)   ABG PO2 80 - 100 mm Hg 98   ABG HCO3  See chart for results   ABG O2 SATURATION 94.0 - 100.0 % 98.8   Blood Gas Base Excess  See chart for results   TOTAL HEMOGLOBIN 12.0 - 16.0 g/dL 8.9 (L)   METHEMOGLOBIN 0.4 - 1.5 % SAT 0.8   POTASSIUM BLOOD GAS 3.6 - 5.1 mmol/L 4.5   SODIUM BLOOD  - 145 mmol/L 130 (L)   Lactic Acid (Blood Gas) 0.5 - 2.0 mmol/L 14.3 (HH)   Oxygen Delivery Device  Room Air   (HH): Data is critically high  (L): Data is abnormally low     Latest Reference Range & Units 01/12/24 20:51 01/12/24 20:59  01/13/24 00:11 01/13/24 02:42 01/13/24 05:36 01/14/24 04:12 01/14/24 10:37 01/14/24 14:23 01/15/24 06:53 01/16/24 05:19   Glucose 70 - 99 mg/dL  47 (LL)   200 (H) 75   90 90   Sodium 136 - 145 mmol/L  134 (L)   139 140   138 138   Potassium 3.5 - 5.1 mmol/L  5.0   3.4 (L) 3.4 (L)  3.4 (L)  3.9 3.2 (L) 3.9   Chloride 98 - 112 mmol/L  100   103 106   104 102   Carbon Dioxide, Total 21.0 - 32.0 mmol/L  15.0 (L)   27.0 26.0   28.0 30.0   BUN 9 - 23 mg/dL  27 (H)   26 (H) 20   16 13   CREATININE 0.55 - 1.02 mg/dL  1.57 (H)   1.32 (H) 1.26 (H)   1.14 (H) 1.20 (H)   CALCIUM 8.7 - 10.4 mg/dL  9.0   8.2 (L) 8.5 (L)   8.3 (L) 8.5 (L)   BUN/CREATININE RATIO 10.0 - 20.0   17.2   19.7 15.9   14.0 10.8   EGFR >=60 mL/min/1.73m2  34 (L)   42 (L) 44 (L)   50 (L) 47 (L)   ANION GAP 0 - 18 mmol/L  19 (H)   9 8   6 6   CALCULATED OSMOLALITY 275 - 295 mOsm/kg  280   298 (H) 291   287 286   ALKALINE PHOSPHATASE 55 - 142 U/L  135   110        AST (SGOT) <=34 U/L  38 (H)   38 (H)        ALT (SGPT) 10 - 49 U/L  17   15        Total Bilirubin 0.2 - 1.1 mg/dL  2.2 (H)   1.6 (H)        Bilirubin, Direct <=0.3 mg/dL     1.1 (H)        Globulin 2.8 - 4.4 g/dL  3.2           Magnesium, Serum 1.6 - 2.6 mg/dL  2.0       1.4 (L) 1.4 (L)   PHOSPHORUS 2.4 - 5.1 mg/dL  2.4 - 5.1 mg/dL  4.0    2.0 (L)   2.4  2.4 2.7  2.7    - 246 U/L      339 (H)       Lipase 12 - 53 U/L     32        LACTIC ACID 0.5 - 2.0 mmol/L 12.0 (HH)  6.5 (HH) 4.8 (HH)   1.6      (LL): Data is critically low  (HH): Data is critically high  (H): Data is abnormally high  (L): Data is abnormally low     Latest Reference Range & Units 01/13/24 13:58   BETA NATRIURETIC PEPTIDE <100 pg/mL 15,376 (H)   (H): Data is abnormally high       Latest Reference Range & Units 01/12/24 21:00 01/13/24 02:42 01/13/24 05:36 01/13/24 13:58 01/13/24 18:21 01/14/24 04:12 01/15/24 06:53 01/16/24 01:33 01/16/24 05:19   WBC 4.0 - 11.0 x10(3) uL 2.9 (L)  3.0 (L)  3.9 (L) 3.1 (L) 2.7 (L)  3.0 (L)    Hemoglobin 12.0 - 16.0 g/dL 8.6 (L) 7.3 (L) 7.1 (L) 8.5 (L) 9.3 (L) 8.9 (L) 8.8 (L) 7.9 (L) 9.6 (L)   Hematocrit 35.0 - 48.0 % 28.9 (L) 23.0 (L) 22.1 (L) 26.4 (L) 26.5 (L) 27.5 (L) 27.5 (L) 25.2 (L) 29.3 (L)   Platelet Count 150.0 - 450.0 10(3)uL   38.0 (L)  26.0 (L) 35.0 (L) 32.0 (L)  88.0 (L)   (L): Data is abnormally low     Latest Reference Range & Units 01/12/24 20:59 01/13/24 08:18 01/13/24 18:21 01/14/24 04:12 01/15/24 06:53   PT 11.6 - 14.8 seconds 51.4 (H) 50.3 (H) 38.4 (H) 29.4 (H) 20.0 (H)   INR 0.80 - 1.20  5.24 (HH) 5.10 (HH) 3.63 (H) 2.60 (H) 1.60 (H)   APTT 23.3 - 35.6 seconds 44.1 (H)    42.1 (H)   (HH): Data is critically high  (H): Data is abnormally high        MEDICATIONS ADMINISTERED IN LAST 1 DAY:  Transfuse platelets       Date Action Dose Route User    1/15/2024 1920 Rate/Dose Change (none) Intravenous UllrichDodie shawily    1/15/2024 1835 Rate/Dose Change (none) Intravenous Ullrich, Amelie    1/15/2024 1820 New Bag (none) Intravenous Ullrich, Emily          Transfuse platelets       Date Action Dose Route User    1/15/2024 2212 Rate/Dose Change (none) Intravenous Tara Crane RN    1/15/2024 2157 New Bag (none) Intravenous Tara Crane, JEFFERY          amitriptyline (Elavil) tab 25 mg       Date Action Dose Route User    1/15/2024 2104 Given 25 mg Oral Tara Crane RN          carvedilol (Coreg) tab 25 mg       Date Action Dose Route User    1/16/2024 0819 Given 25 mg Oral Angy Durham RN    1/15/2024 2104 Given 25 mg Oral Tara Crane RN          furosemide (Lasix) 10 mg/mL injection 20 mg       Date Action Dose Route User    1/16/2024 0819 Given 20 mg Intravenous Angy Durham RN    1/15/2024 1726 Given 20 mg Intravenous Ullrich, Emily          furosemide (Lasix) 10 mg/mL injection 20 mg       Date Action Dose Route User    1/16/2024 1103 Given 20 mg Intravenous Angy Durham RN          HYDROcodone-acetaminophen (Norco) 5-325 MG per tab 1 tablet       Date Action  Dose Route User    1/16/2024 0454 Given 1 tablet Oral Tara Crane RN    1/15/2024 2218 Given 1 tablet Oral Tara Crane RN    1/15/2024 1523 Given 1 tablet Oral Ullrich, Emily          magnesium oxide (Mag-Ox) tab 800 mg       Date Action Dose Route User    1/16/2024 0819 Given 800 mg Oral Angy Durham RN          pantoprazole (Protonix) 40 mg in sodium chloride 0.9% PF 10 mL IV push       Date Action Dose Route User    1/16/2024 0819 Given 40 mg Intravenous Angy Durham RN    1/15/2024 2056 Given 40 mg Intravenous Tara Crane RN          piperacillin-tazobactam (Zosyn) 3.375 g in dextrose 5% 100 mL IVPB-ADDV       Date Action Dose Route User    1/16/2024 1103 New Bag 3.375 g Intravenous Angy Durham RN    1/16/2024 0508 New Bag 3.375 g Intravenous Tara Crane RN    1/15/2024 2056 New Bag 3.375 g Intravenous Tara Crane RN    1/15/2024 1518 New Bag 3.375 g Intravenous Ullrich, Emily          polyethylene glycol-electrolyte (Golytely) 236 g oral solution 4,000 mL       Date Action Dose Route User    1/15/2024 1725 Given 4,000 mL Oral Ullrich, Emily          potassium chloride 20 mEq/100mL IVPB premix 20 mEq       Date Action Dose Route User    1/16/2024 0148 New Bag 20 mEq Intravenous Tara Crane RN          potassium phosphate dibasic 15 mmol in sodium chloride 0.9% 250 mL IVPB       Date Action Dose Route User    1/15/2024 1519 New Bag 15 mmol Intravenous Ullrich, Emily          sacubitril-valsartan (Entresto) 24-26 MG per tab 1 tablet       Date Action Dose Route User    1/16/2024 0818 Given 1 tablet Oral Angy Durham RN    1/15/2024 2104 Given 1 tablet Oral Tara rCane RN          sodium chloride 0.9% infusion       Date Action Dose Route User    1/15/2024 1820 New Bag (none) Intravenous Ullrich, Emily            Vitals (last day)       Date/Time Temp Pulse Resp BP SpO2 Weight O2 Device O2 Flow Rate (L/min) [facility]    01/16/24 0818 97.9 °F (36.6 °C) 60 18  136/62 97 % -- None (Room air) -- JR    01/16/24 0452 97.7 °F (36.5 °C) 60 20 129/81 97 % -- None (Room air) -- MN    01/16/24 0430 -- -- -- -- -- 151 lb -- -- TB    01/16/24 0030 98.2 °F (36.8 °C) 60 18 121/67 99 % -- None (Room air) -- MN    01/15/24 2357 98 °F (36.7 °C) 61 20 126/64 98 % -- None (Room air) -- MN    01/15/24 2257 97.9 °F (36.6 °C) 59 18 112/64 97 % -- None (Room air) -- MN    01/15/24 2212 98.1 °F (36.7 °C) 61 18 122/69 96 % -- None (Room air) -- MN    01/15/24 2157 98.1 °F (36.7 °C) 61 18 118/62 98 % -- None (Room air) -- MN    01/15/24 2020 97.3 °F (36.3 °C) 61 18 129/74 99 % -- None (Room air) -- MN    01/15/24 2000 -- 60 -- -- -- -- -- -- KD    01/15/24 1920 97.3 °F (36.3 °C) 60 16 121/78 99 % -- -- -- EU    01/15/24 1835 97.5 °F (36.4 °C) 60 16 117/67 100 % -- -- -- EU    01/15/24 1820 97.3 °F (36.3 °C) 60 16 112/79 100 % -- -- -- EU    01/15/24 1722 97.7 °F (36.5 °C) 60 16 111/65 100 % -- None (Room air) -- EU    01/15/24 0821 98 °F (36.7 °C) 60 16 142/82 100 % -- None (Room air) -- EU    01/15/24 0536 -- -- -- -- -- 153 lb -- -- MO    01/15/24 0536 98 °F (36.7 °C) 60 18 129/80 99 % -- None (Room air) -- LS          01/13/24 1130 98 °F (36.7 °C) 60 21 131/90 100 % -- Nasal cannula 5 L/min MT   01/13/24 1115 98.1 °F (36.7 °C) 59 20 129/85 100 % -- Nasal cannula 5 L/min MT   01/13/24 1100 -- 60 17 123/79 99 % -- Nasal cannula 5 L/min MT   01/13/24 1000 -- 60 15 127/79 100 % -- Nasal cannula 5 L/min MT   01/13/24 0933 -- -- -- -- 100 % -- Nasal cannula 5 L/min FG       01/12/24 2044 95.7 °F (35.4 °C) Abnormal  -- -- -- -- -- -- -- EO   01/12/24 2042 95.7 °F (35.4 °C) Abnormal  -- -- -- -- -- -- -- AA   01/12/24 2034 -- 64 28 Abnormal  142/106 Abnormal  98 % 140 lb None (Room air) -- EO          Product Unit Status Volume Start End            Transfuse RBC       23  [id number]  [id number] Completed 01/13/24 1611 487.5 mL 01/13/24 1115 01/13/24 1345                Transfuse fresh frozen plasma        23  [id number]  [id number] Completed 01/13/24 0145 449.67 mL 01/13/24 0010 01/13/24 0145                Transfuse platelets       23  [id number]  [id number] Stopped 220 mL 01/15/24 2157 01/16/24 0030       23  [id number]  [id number] Stopped 200 mL 01/15/24 1820 01/15/24 2020

## 2024-01-16 NOTE — PROGRESS NOTES
Progress Note  Margaret Silverio Patient Status:  Inpatient    3/14/1945 MRN [id number]   Location [facility] 3W/SW Attending Rashaad Nicholson MD   Hosp Day # 3 PCP Johnathon Rodriguez DO     Subjective:  Pt sitting up in bed  Denies any chest pain, SOB, or dizziness     Objective:  /62 (BP Location: Left arm)   Pulse 60   Temp 97.9 °F (36.6 °C) (Oral)   Resp 18   Ht 5' 5\" (1.651 m)   Wt 151 lb (68.5 kg)   SpO2 97%   BMI 25.13 kg/m²     Telemetry: AV paced      Intake/Output:    Intake/Output Summary (Last 24 hours) at 2024 0911  Last data filed at 2024 0547  Gross per 24 hour   Intake 2780 ml   Output 1300 ml   Net 1480 ml       Last 3 Weights   24 0430 151 lb (68.5 kg)   01/15/24 0536 153 lb (69.4 kg)   24 0119 150 lb 5.7 oz (68.2 kg)   24 140 lb (63.5 kg)   23 1412 145 lb (65.8 kg)   23 0818 145 lb (65.8 kg)       Labs:  Recent Labs   Lab 24  0412 24  1423 01/15/24  0653 24  0519   GLU 75  --  90 90   BUN 20  --  16 13   CREATSERUM 1.26*  --  1.14* 1.20*   EGFRCR 44*  --  50* 47*   CA 8.5*  --  8.3* 8.5*     --  138 138   K 3.4*  3.4* 3.9 3.2* 3.9     --  104 102   CO2 26.0  --  28.0 30.0     Recent Labs   Lab 24  2100 24  0242 24  0536 24  1358 24  0412 01/15/24  0653 24  0133 24  0519   RBC 2.85*  --  2.32*   < > 2.96* 2.92*  --  3.05*   HGB 8.6*   < > 7.1*   < > 8.9* 8.8* 7.9* 9.6*   HCT 28.9*   < > 22.1*   < > 27.5* 27.5* 25.2* 29.3*   .4*  --  95.3   < > 92.9 94.2  --  96.1   MCH 30.2  --  30.6   < > 30.1 30.1  --  31.5   MCHC 29.8*  --  32.1   < > 32.4 32.0  --  32.8   RDW 17.4*  --  17.0*   < > 17.9* 18.1*  --  18.2*   NEPRELIM 1.49*  --  2.36  --   --   --   --   --    WBC 2.9*  --  3.0*   < > 3.1* 2.7*  --  3.0*   PLT  --   --  38.0*   < > 35.0* 32.0*  --  88.0*    < > = values in this interval not displayed.         Recent Labs   Lab 24  5574    TROPHS 32     Lab Results   Component Value Date    INR 1.60 (H) 01/15/2024    INR 2.60 (H) 01/14/2024    INR 3.63 (H) 01/13/2024       Diagnostics:       Review of Systems   Respiratory: Negative.     Cardiovascular: Negative.        Physical Exam:    Physical Exam  Vitals reviewed.   Constitutional:       General: She is not in acute distress.  HENT:      Head: Normocephalic.   Neck:      Vascular: JVD present.   Cardiovascular:      Rate and Rhythm: Normal rate and regular rhythm.      Pulses: Normal pulses.           Dorsalis pedis pulses are 2+ on the right side and 2+ on the left side.      Heart sounds: S1 normal and S2 normal.      Comments: Murmur heard on the right and left of the sternal border  Pulmonary:      Effort: Pulmonary effort is normal.      Breath sounds: Examination of the right-lower field reveals decreased breath sounds and rales. Examination of the left-lower field reveals decreased breath sounds and rales. Decreased breath sounds and rales present.   Abdominal:      General: Abdomen is flat. There is no distension.      Palpations: Abdomen is soft.   Musculoskeletal:      Right lower leg: No edema.      Left lower leg: No edema.      Comments: Contracted wrist joints bilaterally   Skin:     General: Skin is warm and dry.   Neurological:      Mental Status: She is alert and oriented to person, place, and time.   Psychiatric:         Mood and Affect: Mood normal.         Medications:   furosemide  20 mg Intravenous BID (Diuretic)    amitriptyline  25 mg Oral Nightly    carvedilol  25 mg Oral BID    sacubitril-valsartan  1 tablet Oral BID    piperacillin-tazobactam  3.375 g Intravenous Q8H    pantoprazole  40 mg Intravenous Q12H         Assessment/Plan:    Acute hypoxemic respiratory failure secondary to PNA/CHF     - CT chest on presentation with no PE, small loculated right and small left pleural effusion  - chest xray from 1/14 with small right pleural effusion with right lower lobe  airspace opacity which may represent atelectasis or pneumonia  -on Lasix 20mg IV BID  - on IV zosyn  - pulmonary following     Recurrent GI bleed on on long term anticoagulation  - Received PRBC and FFP transfusion during this admission  - Hgb stable  - Eliquis on hold  - GI following  - consider watchman as long term alternative to anticoagulation     HFrEF  - moderately reduced EF  - last echo on 1/15/24 with LVEF 35-40%  - on entresto, coreg, furosemide     NICM  S/p ICD 9/2023     PAF  - Apixaban on hold due to GIB     Hypertension: well controlled     Rheumatoid arthritis:  -On methotrexate and humera     Pancytopenia  - Hematology following  - multifactorial  -Hypokalemia and Hypomagnesemia   - potassium of 3.2 and magnesium 1.4     Severe Tricuspid valve regurgitation  -on lasix BID     Mitral valve bioprosthesis  - mean gradient 5mm Hg     Plan:  - weight down 2 lb from yesterday, questionable on accuracy of the bed weight.   - Fine crackles on the bases of the lung bilaterally and JVD. Denies any SOB. Will give her extra dose of lasix 20mg x1 now.   - replace mag  per electrolyte protocol  - Pt to go for EGD today. On zosyn IV, and it  should be adequate as prophylaxis for endocarditis.        KODI Mclain  [facility]  1/16/2024  9:11 AM

## 2024-01-16 NOTE — PLAN OF CARE
Pt continues to be on IV Zosyn Q8. Continuing on BID IV lasix, given an extra dose of 20mg this morning. Pt was unable to go for EGD/Colonoscopy today as she was unable to finish the prep last night. Procedure rescheduled for tomorrow at 1pm pending successful prep tonight. CTA ordered for tomorrow, currently machine is down, consent signed, CTA tomorrow afternoon pending machine availability.      Problem: Patient Centered Care  Goal: Patient preferences are identified and integrated in the patient's plan of care  Description: Interventions:  - What would you like us to know as we care for you? From home with spouse  - Provide timely, complete, and accurate information to patient/family  - Incorporate patient and family knowledge, values, beliefs, and cultural backgrounds into the planning and delivery of care  - Encourage patient/family to participate in care and decision-making at the level they choose  - Honor patient and family perspectives and choices  Outcome: Progressing     Problem: Patient/Family Goals  Goal: Patient/Family Long Term Goal  Description: Patient's Long Term Goal: Discharge home     Interventions:  - monitor vs, I&o, pain per protocol   - See additional Care Plan goals for specific interventions  Outcome: Progressing  Goal: Patient/Family Short Term Goal  Description: Patient's Short Term Goal: manage risk of bleeding    Interventions:   - monitor vs, assess pain, ambulate as tolerated  - See additional Care Plan goals for specific interventions  Outcome: Progressing     Problem: RISK FOR INFECTION - ADULT  Goal: Absence of fever/infection during anticipated neutropenic period  Description: INTERVENTIONS  - Monitor WBC  - Administer growth factors as ordered  - Implement neutropenic guidelines  Outcome: Progressing     Problem: SAFETY ADULT - FALL  Goal: Free from fall injury  Description: INTERVENTIONS:  - Assess pt frequently for physical needs  - Identify cognitive and physical deficits  and behaviors that affect risk of falls.  - Milmay fall precautions as indicated by assessment.  - Educate pt/family on patient safety including physical limitations  - Instruct pt to call for assistance with activity based on assessment  - Modify environment to reduce risk of injury  - Provide assistive devices as appropriate  - Consider OT/PT consult to assist with strengthening/mobility  - Encourage toileting schedule  Outcome: Progressing     Problem: DISCHARGE PLANNING  Goal: Discharge to home or other facility with appropriate resources  Description: INTERVENTIONS:  - Identify barriers to discharge w/pt and caregiver  - Include patient/family/discharge partner in discharge planning  - Arrange for needed discharge resources and transportation as appropriate  - Identify discharge learning needs (meds, wound care, etc)  - Arrange for interpreters to assist at discharge as needed  - Consider post-discharge preferences of patient/family/discharge partner  - Complete POLST form as appropriate  - Assess patient's ability to be responsible for managing their own health  - Refer to Case Management Department for coordinating discharge planning if the patient needs post-hospital services based on physician/LIP order or complex needs related to functional status, cognitive ability or social support system  Outcome: Progressing     Problem: RESPIRATORY - ADULT  Goal: Achieves optimal ventilation and oxygenation  Description: INTERVENTIONS:  - Assess for changes in respiratory status  - Assess for changes in mentation and behavior  - Position to facilitate oxygenation and minimize respiratory effort  - Oxygen supplementation based on oxygen saturation or ABGs  - Provide Smoking Cessation handout, if applicable  - Encourage broncho-pulmonary hygiene including cough, deep breathe, Incentive Spirometry  - Assess the need for suctioning and perform as needed  - Assess and instruct to report SOB or any respiratory  difficulty  - Respiratory Therapy support as indicated  - Manage/alleviate anxiety  - Monitor for signs/symptoms of CO2 retention  Outcome: Progressing     Problem: HEMATOLOGIC - ADULT  Goal: Maintains hematologic stability  Description: INTERVENTIONS  - Assess for signs and symptoms of bleeding or hemorrhage  - Monitor labs and vital signs for trends  - Administer supportive blood products/factors, fluids and medications as ordered and appropriate  - Administer supportive blood products/factors as ordered and appropriate  Outcome: Progressing  Goal: Free from bleeding injury  Description: (Example usage: patient with low platelets)  INTERVENTIONS:  - Avoid intramuscular injections, enemas and rectal medication administration  - Ensure safe mobilization of patient  - Hold pressure on venipuncture sites to achieve adequate hemostasis  - Assess for signs and symptoms of internal bleeding  - Monitor lab trends  - Patient is to report abnormal signs of bleeding to staff  - Avoid use of toothpicks and dental floss  - Use electric shaver for shaving  - Use soft bristle tooth brush  - Limit straining and forceful nose blowing  Outcome: Progressing     Problem: GASTROINTESTINAL - ADULT  Goal: Maintains or returns to baseline bowel function  Description: INTERVENTIONS:  - Assess bowel function  - Maintain adequate hydration with IV or PO as ordered and tolerated  - Evaluate effectiveness of GI medications  - Encourage mobilization and activity  - Obtain nutritional consult as needed  - Establish a toileting routine/schedule  - Consider collaborating with pharmacy to review patient's medication profile  Outcome: Progressing

## 2024-01-16 NOTE — PROGRESS NOTES
Hematology Oncology Progress Note    Patient Name: Margaret Silverio   YOB: 1945   Medical Record Number: [id number]   CSN: [id number]   Attending Physician: Briana Snyder MD     Subjective:  Feels well, no acute issues. Unable to finish the bowel prep, endoscopies rescheduled for tomorrow.   Reports watery black stools since started Golytely.     Objective:  Vitals:  Vitals:    01/16/24 0430 01/16/24 0452 01/16/24 0818 01/16/24 1443   BP:  129/81 136/62 133/76   BP Location:  Left arm Left arm Left arm   Pulse:  60 60 60   Resp:  20 18 18   Temp:  97.7 °F (36.5 °C) 97.9 °F (36.6 °C) 98 °F (36.7 °C)   TempSrc:  Oral Oral Oral   SpO2:  97% 97% 91%   Weight: 68.5 kg (151 lb)      Height:           Current Medications:    Current Facility-Administered Medications:     furosemide (Lasix) 10 mg/mL injection 20 mg, 20 mg, Intravenous, BID (Diuretic)    amitriptyline (Elavil) tab 25 mg, 25 mg, Oral, Nightly    carvedilol (Coreg) tab 25 mg, 25 mg, Oral, BID    sacubitril-valsartan (Entresto) 24-26 MG per tab 1 tablet, 1 tablet, Oral, BID    ondansetron (Zofran) 4 MG/2ML injection 4 mg, 4 mg, Intravenous, Q6H PRN    piperacillin-tazobactam (Zosyn) 3.375 g in dextrose 5% 100 mL IVPB-ADDV, 3.375 g, Intravenous, Q8H    pantoprazole (Protonix) 40 mg in sodium chloride 0.9% PF 10 mL IV push, 40 mg, Intravenous, Q12H    HYDROcodone-acetaminophen (Norco) 5-325 MG per tab 1 tablet, 1 tablet, Oral, Q6H PRN    Physical Examination:  General: Alert and oriented x 3, not in acute distress.  HEENT: EOMs intact. Oropharynx is clear.   Chest: Clear to auscultation.  Heart: Regular rate and rhythm.   Abdomen: Soft, non tender  Extremities: No edema.  Neurological: Grossly intact.     Labs:  Recent Labs   Lab 01/12/24  2100 01/13/24  0242 01/13/24  0536 01/13/24  1358 01/14/24  0412 01/15/24  0653 01/16/24  0133 01/16/24  0519   RBC 2.85*  --  2.32*   < > 2.96* 2.92*  --  3.05*   HGB 8.6*   < > 7.1*   < > 8.9* 8.8* 7.9* 9.6*    HCT 28.9*   < > 22.1*   < > 27.5* 27.5* 25.2* 29.3*   .4*  --  95.3   < > 92.9 94.2  --  96.1   MCH 30.2  --  30.6   < > 30.1 30.1  --  31.5   MCHC 29.8*  --  32.1   < > 32.4 32.0  --  32.8   RDW 17.4*  --  17.0*   < > 17.9* 18.1*  --  18.2*   NEPRELIM 1.49*  --  2.36  --   --   --   --   --    WBC 2.9*  --  3.0*   < > 3.1* 2.7*  --  3.0*   PLT  --   --  38.0*   < > 35.0* 32.0*  --  88.0*    < > = values in this interval not displayed.     Recent Labs   Lab 01/12/24 2059 01/13/24  0536 01/14/24  0412 01/14/24  1423 01/15/24  0653 01/16/24  0519   GLU 47* 200* 75  --  90 90   BUN 27* 26* 20  --  16 13   CREATSERUM 1.57* 1.32* 1.26*  --  1.14* 1.20*   EGFRCR 34* 42* 44*  --  50* 47*   CA 9.0 8.2* 8.5*  --  8.3* 8.5*   ALB 3.6 3.0* 3.2  --  3.0* 3.3   * 139 140  --  138 138   K 5.0 3.4* 3.4*  3.4* 3.9 3.2* 3.9    103 106  --  104 102   CO2 15.0* 27.0 26.0  --  28.0 30.0   ALKPHO 135 110  --   --   --   --    AST 38* 38*  --   --   --   --    ALT 17 15  --   --   --   --    BILT 2.2* 1.6*  --   --   --   --    TP 6.8 5.6*  --   --   --   --       Recent Labs   Lab 01/12/24 2059 01/13/24  0818 01/13/24  1821 01/14/24  0412 01/15/24  0653   INR 5.24*   < > 3.63* 2.60* 1.60*   PTT 44.1*  --   --   --  42.1*    < > = values in this interval not displayed.        Radiology:  XR CHEST AP PORTABLE  (CPT=71045)    Result Date: 1/16/2024  CONCLUSION:  1. Redemonstration of mild cardiomegaly and mild pulmonary vascular congestion with interstitial edema suggesting cardiac failure/fluid overload.  Worsening moderate opacification of the right lung base suggesting worsening right pleural effusion which may in part be loculated, and I can not exclude right basal pneumonia and or atelectasis.  Follow-up studies are advised.  Mild blunting of left costophrenic angle.    Dictated by (CST): Josue Hogan MD on 1/16/2024 at 9:01 AM     Finalized by (CST): Josue Hogan MD on 1/16/2024 at 9:03 AM             Impression and Plan:    Acute on chronic anemia   - chronic anemia multifactorial; anemia of chronic disease, renal failure, drug induced (methotrexate) acutely worsening due to GI blood loss in the setting of anticoagulation  - prior BMBx was negative per patient- done in [place] few years ago. no records.   - s/p 1 unit pRBC on 1/13 with adequate response 7.1--> 9.3--> 8.9 --> 9.6  - labs not consistent with hemolysis; low haptoglobin is likely due to recent transfusion, no reticulocytosis, LDH and bili only slightly elevated which is non specific.  - folate, B12 and iron labs ok.   - transfuse as needed to keep Hgb > 8 given cardiac disease     Thrombocytopenia  - severe acute thrombocytopenia; admit plt 30's, normal count at baseline  - probably consumptive in the setting of acute illness/sepsis/bleeding vs drug induced vs immune thrombocytopenia   - peripheral smear with no platelet clumps or schistocytes. Labs not consistent with hemolysis of DIC.   - transfuse as needed to maintain plt > 20 or >50 if actively bleeding  - s/p 2 units overnight prior to endoscopies with excellent response. Plt 88 today.   - repeat CBC in am, transfuse if needed to keep plt > 50 prior to procedure.  - will consider a trial of steroids after EGD for possible ITP    Melena  - presenting with melena x1 week. 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  - s/p 1 FFP and  vitamin K 2 mg IV on 1/13--> INR down to 1.6   - continue holding eliquis      Leukopenia  - mainly lymphopenia, likely drug induced  - will monitor   - may need repeat BMBx if worsening cytopenias or remain of unclear etiology    Sepsis  - unclear source. On empiric abx per primary      Rheumatoid arthritis   - on methotrexate > 20 yrs and  humira added 2021      Will continue to follow        Briana Snyder MD   [facility]

## 2024-01-16 NOTE — PROGRESS NOTES
[facility]     Progress Note    Margaret Silverio Patient Status:  Inpatient    3/14/1945 MRN [id number]   Location [facility] 2W/SW Attending Regan Cruz MD   Hosp Day # 3 PCP Johnathon Rodriguez,        Subjective:   Patient seen and examined.  Denies significant dyspnea.  No episodes of GI bleeding noted.    Objective:   Blood pressure 136/62, pulse 60, temperature 97.9 °F (36.6 °C), temperature source Oral, resp. rate 18, height 5' 5\" (1.651 m), weight 151 lb (68.5 kg), SpO2 97%.  Intake/Output:   Last 3 shifts: I/O last 3 completed shifts:  In: 3260 [P.O.:2220; I.V.:420; Blood:420; IV PIGGYBACK:200]  Out: 1900 [Urine:1550; Stool:350]   This shift: No intake/output data recorded.     Vent Settings:      Hemodynamic parameters (last 24 hours):      Scheduled Meds:         Continuous Infusions:     Physical Exam  Constitutional: no acute distress  Eyes: PERRL  ENT: nares pateint  Neck: supple, no JVD  Cardio: RRR, S1 S2  Respiratory: clear to auscultation bilaterally, no wheezing, rales, rhonchi, crackles  GI: abdomen soft, non tender, active bowel sounds, no organomegaly  Extremities: no clubbing, cyanosis, edema  Neurologic: no gross motor deficits  Skin: warm, dry      Results:     Lab Results   Component Value Date    WBC 3.0 2024    HGB 9.6 2024    HCT 29.3 2024    PLT 88.0 2024    CREATSERUM 1.20 2024    BUN 13 2024     2024    K 3.9 2024     2024    CO2 30.0 2024    GLU 90 2024    CA 8.5 2024    ALB 3.3 2024    MG 1.4 2024    PHOS 2.7 2024    PHOS 2.7 2024       XR CHEST AP PORTABLE  (CPT=71045)    Result Date: 2024  CONCLUSION:  1. Redemonstration of mild cardiomegaly and mild pulmonary vascular congestion with interstitial edema suggesting cardiac failure/fluid overload.  Worsening moderate opacification of the right lung base suggesting worsening right  pleural effusion which may in part be loculated, and I can not exclude right basal pneumonia and or atelectasis.  Follow-up studies are advised.  Mild blunting of left costophrenic angle.    Dictated by (CST): Josue Hogan MD on 1/16/2024 at 9:01 AM     Finalized by (CST): Josue Hogan MD on 1/16/2024 at 9:03 AM                 Assessment   1.  Acute hypoxemic respiratory failure  2.  Possible GI bleed  3.  Pancytopenia  4.  Lactic acidosis  5.  Acute kidney injury  6.  Hypokalemia  7.  Atrial fibrillation  8.  CHF  9.  Coagulopathy  10.  Nonischemic cardiomyopathy  11.  Rheumatoid arthritis     Plan   -Patient presents with evidence of fatigue, dyspnea and concern for melanotic stools over the last several days  -Oxygenation requirements improved during hospital course.  Weaned off BiPAP  -CT chest on presentation with no pulmonary embolism seen.  Small loculated right and small left pleural effusion seen.  No significant consolidation seen  -CT abdomen pelvis on presentation with findings consistent with chronic pancreatitis  -Empiric IV antibiotic therapy at this time.  -Transfuse for hemoglobin below 7.   -Recommendations per GI  -Closely monitor renal function and output  -Hold anticoagulation at this time  -DVT prophylaxis: Eli Parsons, DO  Pulmonary Critical Care Medicine  [facility]

## 2024-01-16 NOTE — PROGRESS NOTES
[facility]    Progress Note    Margaret Silverio Patient Status:  Inpatient    3/14/1945 MRN [id number]   Location [facility] 2W/SW Attending Regan Cruz MD   Hosp Day # 3 PCP Johnathon Rodriguez DO     Chief Complaint:   Chief Complaint   Patient presents with    Difficulty Breathing   Diarrhea, black stools.     Subjective:   Margaret Silverio is doing well. No SOB no CP. Cough better. Playing on her phone in recliner.  She states she cannot finish the gallon GoLytely, so the endoscopy is postponed to tomorrow.    Per RN no events overnight   Objective:   Objective:    Blood pressure 129/81, pulse 60, temperature 97.7 °F (36.5 °C), temperature source Oral, resp. rate 20, height 5' 5\" (1.651 m), weight 151 lb (68.5 kg), SpO2 97%.    Physical Exam:    General: No acute distress.   Respiratory: Clear to auscultation bilaterally. No wheezes. No rhonchi.  Cardiovascular: S1, S2. Regular rate and rhythm. No rubs or gallops. + 2/6 Systolic M LLSB  Abdomen: Soft, nontender, nondistended.  Positive bowel sounds. No rebound or guarding.  Neurologic: No focal neurological deficits.   Musculoskeletal: Moves all extremities.  Extremities: LUE edema and tenderness near prev IV site    Results:   Results:    Labs:  Recent Labs   Lab 249 24  2100 24  0536 24  0818 24  1358 24  1821 24  0412 01/15/24  0653 24  0133 24  0519   WBC  --    < > 3.0*  --   --  3.9* 3.1* 2.7*  --  3.0*   HGB  --    < > 7.1*  --    < > 9.3* 8.9* 8.8* 7.9* 9.6*   MCV  --    < > 95.3  --   --  90.1 92.9 94.2  --  96.1   PLT  --   --  38.0*  --   --  26.0* 35.0* 32.0*  --  88.0*   INR 5.24*  --   --  5.10*  --  3.63* 2.60* 1.60*  --   --     < > = values in this interval not displayed.       Recent Labs   Lab 24  0536 24  0412 24  1423 01/15/24  0653 24  0519   GLU 47* 200* 75  --  90 90   BUN 27* 26* 20  --  16 13    CREATSERUM 1.57* 1.32* 1.26*  --  1.14* 1.20*   CA 9.0 8.2* 8.5*  --  8.3* 8.5*   ALB 3.6 3.0* 3.2  --  3.0* 3.3   * 139 140  --  138 138   K 5.0 3.4* 3.4*  3.4* 3.9 3.2* 3.9    103 106  --  104 102   CO2 15.0* 27.0 26.0  --  28.0 30.0   ALKPHO 135 110  --   --   --   --    AST 38* 38*  --   --   --   --    ALT 17 15  --   --   --   --    BILT 2.2* 1.6*  --   --   --   --    TP 6.8 5.6*  --   --   --   --        Estimated Creatinine Clearance: 35.3 mL/min (A) (based on SCr of 1.2 mg/dL (H)).    Recent Labs   Lab 01/13/24  1821 01/14/24  0412 01/15/24  0653   PTP 38.4* 29.4* 20.0*   INR 3.63* 2.60* 1.60*              Culture:  Hospital Encounter on 01/12/24   1. Blood Culture     Status: None (Preliminary result)    Collection Time: 01/12/24  9:33 PM    Specimen: Blood,peripheral   Result Value Ref Range    Blood Culture Result No Growth 3 Days N/A   2. Urine Culture, Routine     Status: None    Collection Time: 01/12/24  8:53 PM    Specimen: Urine, clean catch   Result Value Ref Range    Urine Culture No Growth at 18-24 hrs. N/A       Cardiac  No results for input(s): \"TROP\", \"PBNP\" in the last 168 hours.      Imaging: Imaging data reviewed in Epic.  US VENOUS DOPPLER ARM LEFT - DIAG IMG (CPT=93971)    Result Date: 1/14/2024  CONCLUSION: No evidence of left upper extremity DVT.   Dictated by (CST): Maxx Enriquez MD on 1/14/2024 at 12:04 PM     Finalized by (CST): Maxx Enriquez MD on 1/14/2024 at 12:05 PM           Medications:    magnesium oxide  800 mg Oral Once    furosemide  20 mg Intravenous BID (Diuretic)    amitriptyline  25 mg Oral Nightly    carvedilol  25 mg Oral BID    sacubitril-valsartan  1 tablet Oral BID    piperacillin-tazobactam  3.375 g Intravenous Q8H    pantoprazole  40 mg Intravenous Q12H    glucose  15 g Oral Once         Assessment and Plan:   Margaret Silverio is a 78-year-old female with a history of atrial fibrillation on Eliquis, hypertension, chronic systolic heart failure  and status post pacemaker placement earlier in September, who p/w shortness of breath fatigue and melanotic stools for a week.       Acute upper GI bleed   Acute blood loss anemia  Chronic anemia   - Patient with 1 week of diarrhea and melanotic stools PTA  - Hb 7.1 lowest. Trending up now   - GI on consult.   - hold Eliquis.   - transfuse for Hb < 7.0 or PLT < 10K or < 50K with active bleeding   - s/p FFP 1 unit and 1 unit PRBC.   - Will give 2 units PLT today for EGD/Colon tomorrow.   - h/o EGD 04/23' with AVM s/p hemoclip   - She states she cannot finish the gallon GoLytely, so the endoscopy is postponed to tomorrow.    Acute hypoxemic respiratory failure   - secondary to CHF and RLL PNA   - off NIPPV. Off o2 NC   - Pulmonary on consult.   - CXR with cardiomegaly and vascular congestion, R pleural effusion and R basal pulm opacity ? PNA  - Lasix 20mg IV BID   - IV zosyn empirically   - blood cx 2/2 NGTD. Genexpert neg,     Acute pancytopenia   - Likely related to severe sepsis on admit.   - retic ct 13, B12 ok, folate pending. Hemolysis labs negative.   - Given PRBC and will give PLT today x 2 units   - HONC on consult     Severe sepsis   - secondary to PNA   - Lactic acid 12 on admit --> 4.8 will rpt   - blood cx x 2 neg   - CXR possible RLL PNA   - IV zosyn   - CT chest small loculated R and small L pleural effusion no consolidation. CT abd pelvis chronic pancreatitis   - C.diff and GI panel pending   - Ucx negative     Acute on Chronic combined CHF   Severe Tricuspid valve regurgitation  Mitral valve bioprosthesis  NICM s/p ICD 9/2023  - ECHO from 10/2023 with LVEF 37% grade 4 TR.   - Cards consult.   - Lasix 20mg IV BID   - coreg 25mg BID   - entresto   - daily weights , I/O     Paroxysmal A.fib   - hold home Eiquis   - rate controlled.   - consider eventual outpt watchman with recurrent GI bleed    Acute kidney injury on CKD III  - Improved.   - Possible ATN from hypoperfusion/anemia vs sepsis  - baseline  creat 1.3 per EMR   - Monitor UOP   - UA with hyaline casts indicative of decreased renal perfusion     LUE edema  - elevate. Hot packs   - US negative for DVT.   - IV infiltrated overnight 1/13     Other medical problems  H/o RA  H/o small bowel AVM's    >55min spent, >50% spent counseling and coordinating care in the form of educating pt/family and d/w consultants and staff. Most of the time spent discussing the above plan.      Rashaad Nicholson MD, PhD  Message via Secure Chat  [facility] Hospitalist        Supplementary Documentation:     Quality:  DVT Prophylaxis: SCD  CODE status: Full  Dispo: per clinical course    Estimated date of discharge: TBD  Discharge is dependent on: clinical stability  At this point Ms. Silverio is expected to be discharge to: home

## 2024-01-17 ENCOUNTER — APPOINTMENT (OUTPATIENT)
Dept: CT IMAGING | Facility: HOSPITAL | Age: 79
End: 2024-01-17
Attending: INTERNAL MEDICINE
Payer: MEDICARE

## 2024-01-17 ENCOUNTER — ANESTHESIA (OUTPATIENT)
Dept: ENDOSCOPY | Facility: HOSPITAL | Age: 79
End: 2024-01-17
Payer: MEDICARE

## 2024-01-17 ENCOUNTER — ANESTHESIA EVENT (OUTPATIENT)
Dept: ENDOSCOPY | Facility: HOSPITAL | Age: 79
End: 2024-01-17
Payer: MEDICARE

## 2024-01-17 LAB
ALBUMIN SERPL-MCNC: 3.4 G/DL (ref 3.2–4.8)
ANION GAP SERPL CALC-SCNC: 7 MMOL/L (ref 0–18)
BASOPHILS # BLD AUTO: 0.03 X10(3) UL (ref 0–0.2)
BASOPHILS NFR BLD AUTO: 1.1 %
BLOOD TYPE BARCODE: 1700
BUN BLD-MCNC: 12 MG/DL (ref 9–23)
BUN/CREAT SERPL: 9.4 (ref 10–20)
CALCIUM BLD-MCNC: 9.2 MG/DL (ref 8.7–10.4)
CHLORIDE SERPL-SCNC: 108 MMOL/L (ref 98–112)
CO2 SERPL-SCNC: 30 MMOL/L (ref 21–32)
CREAT BLD-MCNC: 1.27 MG/DL
DEPRECATED RDW RBC AUTO: 58.8 FL (ref 35.1–46.3)
EGFRCR SERPLBLD CKD-EPI 2021: 44 ML/MIN/1.73M2 (ref 60–?)
EOSINOPHIL # BLD AUTO: 0.38 X10(3) UL (ref 0–0.7)
EOSINOPHIL NFR BLD AUTO: 13.4 %
ERYTHROCYTE [DISTWIDTH] IN BLOOD BY AUTOMATED COUNT: 18.1 % (ref 11–15)
GLUCOSE BLD-MCNC: 92 MG/DL (ref 70–99)
HCT VFR BLD AUTO: 27.6 %
HGB BLD-MCNC: 8.6 G/DL
IMM GRANULOCYTES # BLD AUTO: 0.01 X10(3) UL (ref 0–1)
IMM GRANULOCYTES NFR BLD: 0.4 %
LYMPHOCYTES # BLD AUTO: 0.46 X10(3) UL (ref 1–4)
LYMPHOCYTES NFR BLD AUTO: 16.3 %
MAGNESIUM SERPL-MCNC: 1.8 MG/DL (ref 1.6–2.6)
MCH RBC QN AUTO: 30 PG (ref 26–34)
MCHC RBC AUTO-ENTMCNC: 31.2 G/DL (ref 31–37)
MCV RBC AUTO: 96.2 FL
MONOCYTES # BLD AUTO: 0.05 X10(3) UL (ref 0.1–1)
MONOCYTES NFR BLD AUTO: 1.8 %
NEUTROPHILS # BLD AUTO: 1.9 X10 (3) UL (ref 1.5–7.7)
NEUTROPHILS # BLD AUTO: 1.9 X10(3) UL (ref 1.5–7.7)
NEUTROPHILS NFR BLD AUTO: 67 %
OSMOLALITY SERPL CALC.SUM OF ELEC: 299 MOSM/KG (ref 275–295)
PHOSPHATE SERPL-MCNC: 2.5 MG/DL (ref 2.4–5.1)
PLATELET # BLD AUTO: 90 10(3)UL (ref 150–450)
POTASSIUM SERPL-SCNC: 3 MMOL/L (ref 3.5–5.1)
RBC # BLD AUTO: 2.87 X10(6)UL
SODIUM SERPL-SCNC: 145 MMOL/L (ref 136–145)
SOL TRANSFERRIN RECEPTOR: 27.4 NMOL/L
UNIT VOLUME: 197 ML
WBC # BLD AUTO: 2.8 X10(3) UL (ref 4–11)

## 2024-01-17 PROCEDURE — 45382 COLONOSCOPY W/CONTROL BLEED: CPT | Performed by: INTERNAL MEDICINE

## 2024-01-17 PROCEDURE — 0D5L8ZZ DESTRUCTION OF TRANSVERSE COLON, VIA NATURAL OR ARTIFICIAL OPENING ENDOSCOPIC: ICD-10-PCS | Performed by: INTERNAL MEDICINE

## 2024-01-17 PROCEDURE — 99233 SBSQ HOSP IP/OBS HIGH 50: CPT | Performed by: HOSPITALIST

## 2024-01-17 PROCEDURE — 0DB68ZX EXCISION OF STOMACH, VIA NATURAL OR ARTIFICIAL OPENING ENDOSCOPIC, DIAGNOSTIC: ICD-10-PCS | Performed by: INTERNAL MEDICINE

## 2024-01-17 PROCEDURE — 99232 SBSQ HOSP IP/OBS MODERATE 35: CPT | Performed by: INTERNAL MEDICINE

## 2024-01-17 PROCEDURE — 43239 EGD BIOPSY SINGLE/MULTIPLE: CPT | Performed by: INTERNAL MEDICINE

## 2024-01-17 PROCEDURE — 75574 CT ANGIO HRT W/3D IMAGE: CPT | Performed by: INTERNAL MEDICINE

## 2024-01-17 RX ORDER — NALOXONE HYDROCHLORIDE 0.4 MG/ML
0.08 INJECTION, SOLUTION INTRAMUSCULAR; INTRAVENOUS; SUBCUTANEOUS ONCE AS NEEDED
Status: DISCONTINUED | OUTPATIENT
Start: 2024-01-17 | End: 2024-01-17 | Stop reason: HOSPADM

## 2024-01-17 RX ORDER — LIDOCAINE HYDROCHLORIDE 10 MG/ML
INJECTION, SOLUTION EPIDURAL; INFILTRATION; INTRACAUDAL; PERINEURAL AS NEEDED
Status: DISCONTINUED | OUTPATIENT
Start: 2024-01-17 | End: 2024-01-17 | Stop reason: SURG

## 2024-01-17 RX ORDER — SODIUM CHLORIDE, SODIUM LACTATE, POTASSIUM CHLORIDE, CALCIUM CHLORIDE 600; 310; 30; 20 MG/100ML; MG/100ML; MG/100ML; MG/100ML
INJECTION, SOLUTION INTRAVENOUS CONTINUOUS PRN
Status: DISCONTINUED | OUTPATIENT
Start: 2024-01-17 | End: 2024-01-17 | Stop reason: SURG

## 2024-01-17 RX ORDER — SODIUM CHLORIDE, SODIUM LACTATE, POTASSIUM CHLORIDE, CALCIUM CHLORIDE 600; 310; 30; 20 MG/100ML; MG/100ML; MG/100ML; MG/100ML
INJECTION, SOLUTION INTRAVENOUS CONTINUOUS
Status: DISCONTINUED | OUTPATIENT
Start: 2024-01-17 | End: 2024-01-17

## 2024-01-17 RX ADMIN — SODIUM CHLORIDE, SODIUM LACTATE, POTASSIUM CHLORIDE, CALCIUM CHLORIDE: 600; 310; 30; 20 INJECTION, SOLUTION INTRAVENOUS at 14:36:00

## 2024-01-17 RX ADMIN — SODIUM CHLORIDE, SODIUM LACTATE, POTASSIUM CHLORIDE, CALCIUM CHLORIDE: 600; 310; 30; 20 INJECTION, SOLUTION INTRAVENOUS at 13:47:00

## 2024-01-17 RX ADMIN — LIDOCAINE HYDROCHLORIDE 50 MG: 10 INJECTION, SOLUTION EPIDURAL; INFILTRATION; INTRACAUDAL; PERINEURAL at 13:52:00

## 2024-01-17 NOTE — ANESTHESIA POSTPROCEDURE EVALUATION
Patient: Margaret Silverio    Procedure Summary       Date: 01/17/24 Room / Location: [facility] ENDOSCOPY 01 / EM ENDOSCOPY    Anesthesia Start: 1347 Anesthesia Stop:     Procedures:       ESOPHAGOGASTRODUODENOSCOPY (EGD)      COLONOSCOPY Diagnosis: (gastric erythema, right colon AVM, diverticulosis)    Surgeons: Zoraida Rios MD Anesthesiologist: Sushma Marrero CRNA    Anesthesia Type: general ASA Status: 3            Anesthesia Type: general    Vitals Value Taken Time   BP 97/55 01/17/24 1437   Temp N/a 01/17/24 1437   Pulse 60 01/17/24 1437   Resp 17 01/17/24 1437   SpO2 95 % 01/17/24 1437       [facility] AN Post Evaluation:   Patient Evaluated in PACU  Patient Participation: complete - patient participated  Level of Consciousness: sleepy but conscious  Pain Management: adequate  Airway Patency:patent  Yes    Cardiovascular Status: acceptable  Respiratory Status: acceptable and room air  Postoperative Hydration acceptable      Sushma Marrero CRNA  1/17/2024 2:37 PM

## 2024-01-17 NOTE — ANESTHESIA PREPROCEDURE EVALUATION
Anesthesia PreOp Note    HPI:     Margaret Silverio is a 78 year old female who presents for preoperative consultation requested by: Zoraida Rios MD    Date of Surgery: 1/12/2024 - 1/17/2024    Procedure(s):  ESOPHAGOGASTRODUODENOSCOPY (EGD)  COLONOSCOPY  Indication: Anemia, melena    Relevant Problems   No relevant active problems       NPO:  Last Liquid Consumption Date: 01/17/24  Last Liquid Consumption Time: 0300  Last Solid Consumption Date: 01/14/24  Last Solid Consumption Time: 1700  Last Liquid Consumption Date: 01/17/24          History Review:  Patient Active Problem List    Diagnosis Date Noted    Lymphopenia 01/16/2024    Anemia due to stage 3 chronic kidney disease  (HCC) 01/14/2024    Anemia due to GI blood loss 01/14/2024    Anemia of chronic disease 01/14/2024    Melena 01/13/2024    Hypothermia, initial encounter 01/13/2024    Anticoagulated 01/13/2024    Coagulopathy ([facility]) 01/13/2024    SIRS (systemic inflammatory response syndrome) ([facility]) 01/13/2024    Dyspnea, unspecified type 01/12/2024    Atrial flutter ([facility]) 06/06/2023    Acute on chronic HFrEF (heart failure with reduced ejection fraction) ([facility]) 06/06/2023    Acute chest pain 05/03/2023    Acute on chronic congestive heart failure, unspecified heart failure type ([facility]) 05/03/2023    Pleural effusion 05/03/2023    Atrial fibrillation with rapid ventricular response ([facility]) 05/02/2023    ABLA (acute blood loss anemia)     Sepsis due to Escherichia coli ([facility])     Leukocytosis 04/22/2023    Acute GI bleeding 04/22/2023    GEORGE (acute kidney injury) ([facility]) 04/22/2023    Thrombocytopenia ([facility]) 04/22/2023    Metabolic acidosis 04/22/2023    Atrial fibrillation, chronic ([facility]) 04/22/2023    Sepsis due to urinary tract infection  ([facility]) 04/22/2023    Altered mental status 11/27/2022    Altered mental status, unspecified altered mental status type 11/27/2022    Seizure ([facility]) 11/27/2022    Lactic acidosis 11/27/2022    ACC/AHA stage B systolic heart  failure (HCC) 07/27/2022    Congestive heart failure (HCC) 01/26/2018    Coronary arteriosclerosis 08/30/2017    Mitral valve stenosis 08/30/2017    Rheumatoid arthritis (HCC) 08/30/2017    Essential (primary) hypertension 09/15/2015    Stage 3 chronic kidney disease (HCC) 09/15/2015       Past Medical History:   Diagnosis Date    Anemia     Arrhythmia     Arthritis     Deep vein thrombosis (HCC)     Essential hypertension     High blood pressure     History of blood transfusion     Seizure disorder (HCC)     Seizures (HCC)        Past Surgical History:   Procedure Laterality Date    OTHER SURGICAL HISTORY  2017    Heart Surgery     OTHER SURGICAL HISTORY  2020    Bilateral shoulder replacement        Medications Prior to Admission   Medication Sig Dispense Refill Last Dose    HYDROcodone-acetaminophen  MG Oral Tab Take 1 tablet by mouth every 8 (eight) hours as needed for Pain. 30 tablet 0 1/14/2024 at 1400    apixaban 5 MG Oral Tab Take 1 tablet (5 mg total) by mouth 2 (two) times daily.   1/12/2024    AMITRIPTYLINE 25 MG Oral Tab TAKE 1 TABLET(25 MG) BY MOUTH EVERY NIGHT 30 tablet 0 1/16/2024    methotrexate 2.5 MG Oral Tab TAKE 6 TABLETS BY MOUTH 1 TIME A WEEK 77 tablet 0 1/9/2024    alendronate 70 MG Oral Tab Take 1 tablet (70 mg total) by mouth once a week. 12 tablet 0 1/12/2024    ENTRESTO 24-26 MG Oral Tab Take 1 tablet by mouth 2 (two) times daily. 180 tablet 0 1/17/2024    pantoprazole 40 MG Oral Tab EC Take 1 tablet (40 mg total) by mouth daily. 90 tablet 1 1/17/2024    folic acid 800 MCG Oral Tab Take 1 tablet (800 mcg total) by mouth daily. 90 tablet 1 1/12/2024    pregabalin 75 MG Oral Cap Take 1 capsule (75 mg total) by mouth 3 (three) times daily. (Patient not taking: Reported on 1/14/2024) 90 capsule 0 Not Taking    furosemide 20 MG Oral Tab Take 0.5 tablets (10 mg total) by mouth daily. 45 tablet 1 1/17/2024    ferrous sulfate 325 (65 FE) MG Oral Tab EC Take 1 tablet (325 mg total) by mouth  daily with breakfast.   1/12/2024    carvedilol 25 MG Oral Tab Take 1 tablet (25 mg total) by mouth 2 (two) times daily. 60 tablet 0 1/17/2024     Current Facility-Administered Medications Ordered in Epic   Medication Dose Route Frequency Provider Last Rate Last Admin    potassium chloride 40 mEq in 250mL sodium chloride 0.9% IVPB premix  40 mEq Intravenous Once Rashaad Nicholson MD        nitroglycerin (Nitrostat) SL tab 0.4 mg  0.4 mg Sublingual Once Luis Sargent MD        metoprolol tartrate (Lopressor) tab 50 mg  50 mg Oral Once PRN Luis Sargent MD        Or    metoprolol tartrate (Lopressor) tab 100 mg  100 mg Oral Once PRN Luis Sargent MD        metoprolol tartrate (Lopressor) tab 50 mg  50 mg Oral Once PRN Luis Sargent MD        Or    metoprolol tartrate (Lopressor) tab 100 mg  100 mg Oral Once PRN Luis Sargent MD        metoprolol (Lopressor) 5 mg/5mL injection 5 mg  5 mg Intravenous See Admin Instructions Luis Sargent MD        Or    dilTIAZem (cardIZEM) 25 mg/5mL injection 5 mg  5 mg Intravenous See Admin Instructions Luis Sargent MD        metoprolol tartrate (Lopressor) tab 50 mg  50 mg Oral Once PRN Luis Sargent MD        Or    metoprolol tartrate (Lopressor) tab 100 mg  100 mg Oral Once PRN Luis Sargent MD        furosemide (Lasix) 10 mg/mL injection 20 mg  20 mg Intravenous BID (Diuretic) Ricco Encinas MD   20 mg at 01/17/24 0848    amitriptyline (Elavil) tab 25 mg  25 mg Oral Nightly Regan Cruz MD   25 mg at 01/16/24 2209    carvedilol (Coreg) tab 25 mg  25 mg Oral BID Logan Harris MD   25 mg at 01/17/24 0847    sacubitril-valsartan (Entresto) 24-26 MG per tab 1 tablet  1 tablet Oral BID Ricco Encinas MD   1 tablet at 01/17/24 0848    ondansetron (Zofran) 4 MG/2ML injection 4 mg  4 mg Intravenous Q6H PRN Logan Harris MD        piperacillin-tazobactam (Zosyn) 3.375 g in dextrose 5% 100 mL IVPB-ADDV  3.375 g Intravenous Q8H Logan Harris MD 25 mL/hr at 01/17/24 1247 3.375 g at 01/17/24  1247    pantoprazole (Protonix) 40 mg in sodium chloride 0.9% PF 10 mL IV push  40 mg Intravenous Q12H Logan Harris MD   40 mg at 01/17/24 0848    HYDROcodone-acetaminophen (Norco) 5-325 MG per tab 1 tablet  1 tablet Oral Q6H PRN Regan Cruz MD   1 tablet at 01/17/24 0518     Current Outpatient Medications Ordered in Epic   Medication Sig Dispense Refill    HYDROcodone-acetaminophen (NORCO)  MG Oral Tab Take 1 tablet by mouth every 6 (six) hours as needed for Pain. 120 tablet 0       Allergies   Allergen Reactions    Lisinopril Coughing       History reviewed. No pertinent family history.  Social History     Socioeconomic History    Marital status:    Tobacco Use    Smoking status: Former     Packs/day: .25     Types: Cigarettes     Quit date: 2014     Years since quitting: 10.0    Smokeless tobacco: Never   Vaping Use    Vaping Use: Never used   Substance and Sexual Activity    Alcohol use: Never    Drug use: Never       Available pre-op labs reviewed.  Lab Results   Component Value Date    WBC 2.8 (L) 01/17/2024    RBC 2.87 (L) 01/17/2024    HGB 8.6 (L) 01/17/2024    HCT 27.6 (L) 01/17/2024    MCV 96.2 01/17/2024    MCH 30.0 01/17/2024    MCHC 31.2 01/17/2024    RDW 18.1 (H) 01/17/2024    PLT 90.0 (L) 01/17/2024     Lab Results   Component Value Date     01/17/2024    K 3.0 (L) 01/17/2024     01/17/2024    CO2 30.0 01/17/2024    BUN 12 01/17/2024    CREATSERUM 1.27 (H) 01/17/2024    GLU 92 01/17/2024    PGLU 105 (H) 01/16/2024    CA 9.2 01/17/2024     Lab Results   Component Value Date    INR 1.60 (H) 01/15/2024       Vital Signs:  Body mass index is 24.3 kg/m².   height is 1.651 m (5' 5\") and weight is 66.2 kg (146 lb). Her oral temperature is 98.6 °F (37 °C). Her blood pressure is 145/84 and her pulse is 60. Her respiration is 12 and oxygen saturation is 98%.   Vitals:    01/17/24 0432 01/17/24 0512 01/17/24 0847 01/17/24 1312   BP:  157/87 153/81 145/84   Pulse:  60 60 60    Resp:  20 20 12   Temp:  98.1 °F (36.7 °C) 98.6 °F (37 °C)    TempSrc:  Oral Oral    SpO2:  98% 99% 98%   Weight: 66.2 kg (146 lb)   66.2 kg (146 lb)   Height:    1.651 m (5' 5\")        Anesthesia Evaluation     Patient summary reviewed and Nursing notes reviewed    No history of anesthetic complications   Airway   Mallampati: III  TM distance: >3 FB  Neck ROM: full  Dental - Dentition appears grossly intact     Pulmonary - negative ROS and normal exam   Cardiovascular - normal exam  Exercise tolerance: poor  (+) pacemaker, hypertension well controlled, CAD, CHF    Neuro/Psych    (+)  seizures (pt states she was told she had seizure in 11/2023 because she was acting really out of it, but no history besides that, not put on any meds),      (-) CVA    GI/Hepatic/Renal    (+) bowel prep    Endo/Other    (+) arthritis  Abdominal  - normal exam                 Anesthesia Plan:   ASA:  3  Plan:   General  Informed Consent Plan and Risks Discussed With:  Patient  Discussed plan with:  Surgeon      I have informed Margaret Silverio and/or legal guardian or family member of the nature of the anesthetic plan, benefits, risks including possible dental damage if relevant, major complications, and any alternative forms of anesthetic management.   All of the patient's questions were answered to the best of my ability. The patient desires the anesthetic management as planned.  Sushma Marrero CRNA  1/17/2024 1:44 PM  Present on Admission:  **None**

## 2024-01-17 NOTE — PLAN OF CARE
Patient went for CTA this morning, see results. She went for her EGD/Colonoscopy, EGD showed gastric erythema. Colonoscopy showed diverticulosis. K phos and K replaced today for a potassium of 3.0. She continues to be on BID IV lasix. Discharge pending medical clearance.       Problem: Patient Centered Care  Goal: Patient preferences are identified and integrated in the patient's plan of care  Description: Interventions:  - What would you like us to know as we care for you? From home with spouse  - Provide timely, complete, and accurate information to patient/family  - Incorporate patient and family knowledge, values, beliefs, and cultural backgrounds into the planning and delivery of care  - Encourage patient/family to participate in care and decision-making at the level they choose  - Honor patient and family perspectives and choices  Outcome: Progressing     Problem: Patient/Family Goals  Goal: Patient/Family Long Term Goal  Description: Patient's Long Term Goal: Discharge home     Interventions:  - monitor vs, I&o, pain per protocol   - See additional Care Plan goals for specific interventions  Outcome: Progressing  Goal: Patient/Family Short Term Goal  Description: Patient's Short Term Goal: manage risk of bleeding    Interventions:   - monitor vs, assess pain, ambulate as tolerated  - See additional Care Plan goals for specific interventions  Outcome: Progressing     Problem: RISK FOR INFECTION - ADULT  Goal: Absence of fever/infection during anticipated neutropenic period  Description: INTERVENTIONS  - Monitor WBC  - Administer growth factors as ordered  - Implement neutropenic guidelines  Outcome: Progressing     Problem: SAFETY ADULT - FALL  Goal: Free from fall injury  Description: INTERVENTIONS:  - Assess pt frequently for physical needs  - Identify cognitive and physical deficits and behaviors that affect risk of falls.  - [place] fall precautions as indicated by assessment.  - Educate pt/family on  patient safety including physical limitations  - Instruct pt to call for assistance with activity based on assessment  - Modify environment to reduce risk of injury  - Provide assistive devices as appropriate  - Consider OT/PT consult to assist with strengthening/mobility  - Encourage toileting schedule  Outcome: Progressing     Problem: DISCHARGE PLANNING  Goal: Discharge to home or other facility with appropriate resources  Description: INTERVENTIONS:  - Identify barriers to discharge w/pt and caregiver  - Include patient/family/discharge partner in discharge planning  - Arrange for needed discharge resources and transportation as appropriate  - Identify discharge learning needs (meds, wound care, etc)  - Arrange for interpreters to assist at discharge as needed  - Consider post-discharge preferences of patient/family/discharge partner  - Complete POLST form as appropriate  - Assess patient's ability to be responsible for managing their own health  - Refer to Case Management Department for coordinating discharge planning if the patient needs post-hospital services based on physician/LIP order or complex needs related to functional status, cognitive ability or social support system  Outcome: Progressing     Problem: RESPIRATORY - ADULT  Goal: Achieves optimal ventilation and oxygenation  Description: INTERVENTIONS:  - Assess for changes in respiratory status  - Assess for changes in mentation and behavior  - Position to facilitate oxygenation and minimize respiratory effort  - Oxygen supplementation based on oxygen saturation or ABGs  - Provide Smoking Cessation handout, if applicable  - Encourage broncho-pulmonary hygiene including cough, deep breathe, Incentive Spirometry  - Assess the need for suctioning and perform as needed  - Assess and instruct to report SOB or any respiratory difficulty  - Respiratory Therapy support as indicated  - Manage/alleviate anxiety  - Monitor for signs/symptoms of CO2  retention  Outcome: Progressing     Problem: HEMATOLOGIC - ADULT  Goal: Maintains hematologic stability  Description: INTERVENTIONS  - Assess for signs and symptoms of bleeding or hemorrhage  - Monitor labs and vital signs for trends  - Administer supportive blood products/factors, fluids and medications as ordered and appropriate  - Administer supportive blood products/factors as ordered and appropriate  Outcome: Progressing  Goal: Free from bleeding injury  Description: (Example usage: patient with low platelets)  INTERVENTIONS:  - Avoid intramuscular injections, enemas and rectal medication administration  - Ensure safe mobilization of patient  - Hold pressure on venipuncture sites to achieve adequate hemostasis  - Assess for signs and symptoms of internal bleeding  - Monitor lab trends  - Patient is to report abnormal signs of bleeding to staff  - Avoid use of toothpicks and dental floss  - Use electric shaver for shaving  - Use soft bristle tooth brush  - Limit straining and forceful nose blowing  Outcome: Progressing     Problem: GASTROINTESTINAL - ADULT  Goal: Maintains or returns to baseline bowel function  Description: INTERVENTIONS:  - Assess bowel function  - Maintain adequate hydration with IV or PO as ordered and tolerated  - Evaluate effectiveness of GI medications  - Encourage mobilization and activity  - Obtain nutritional consult as needed  - Establish a toileting routine/schedule  - Consider collaborating with pharmacy to review patient's medication profile  Outcome: Progressing

## 2024-01-17 NOTE — OPERATIVE REPORT
ESOPHAGOGASTRODUODENOSCOPY (EGD) & COLONOSCOPY REPORT    Margaret Silverio     3/14/1945 Age 78 year old   PCP Johnathon Rodriguez DO Endoscopist Zoraida Rios MD     Date of procedure: 24    Procedure: EGD w/biopsy & Colonoscopy w/control of bleeding    Pre-operative diagnosis: anemia    Post-operative diagnosis: see impression    Medications: MAC    Withdrawal time: 12 minutes    Complications: none    Procedure: Informed consent was obtained from the patient after the risks of the procedure were discussed, including but not limited to bleeding, perforation, aspiration, infection, or possibility of a missed lesion. We discussed the risks/benefits and alternatives to this procedure, as well as the planned sedation. EGD procedure: The patient was placed in the left lateral decubitus position and begun on continuous blood pressure pulse oximetry and EKG monitoring and this was maintained throughout the procedure. Once an adequate level of sedation was obtained a bite block was placed. Then the lubricated tip of the [id number]-[id number] diagnostic video upper endoscope was inserted and advanced using direct visualization into the posterior pharynx and ultimately into the esophagus. Colonoscopy procedure: Once an adequate level of sedation was obtained a digital rectal exam was completed. Then the lubricated tip of the [id number] diagnostic video colonoscope was inserted and advanced without difficulty to the cecum using the CO2 insufflation technique. The cecum was identified by localizing the trifold, the appendix and the ileocecal valve. A routine second examination of the cecum/ascending colon was performed. Withdrawal was begun with thorough washing and careful examination of the colonic walls and folds. Photodocumentation was obtained. BBPS 6 (/). I then carefully withdrew the instrument from the patient who tolerated the procedure well.     Complications: None    EGD findings:      1. Esophagus:  Normal esophagus  2. Stomach: The stomach distended normally. Normal rugal folds were seen. The pylorus was patent. The gastric mucosa appeared with mild diffuse erythema. Retroflexion revealed a normal fundus and a non-patulous cardia. Random biopsies obtained with cold forceps for histology.   3. Duodenum: The duodenal mucosa appeared normal in the 1st and 2nd portion of the duodenum. Bilious effluent    Colonoscopy findings:    BEVERLEY: normal rectal tone, no masses palpated.    Clear yellow effluent throughout colon  In the transverse colon, there was a small 8 mm non-bleeding AVM, treated with argon plasma coagulation for tissue destruction. No bleeding during or after maneuver.  3. Mild sigmoid diverticulosis  4. A retroflexed view of the rectum revealed no abnormalities.     Impression:  Mild gastric erythema, biopsied  Single small transverse colon AVM s/p APC    Recommend:  Follow-up gastric bx   May resume diet  Weigh risks and benefits of anticoagulation given chronic GI AVM disease     >>>If tissue was obtained and you have not received your pathology results either by phone or letter within 2 weeks, please call our office at [phone].    Specimens: gastric     Blood loss: <1 ml

## 2024-01-17 NOTE — PROGRESS NOTES
[facility]     Gastroenterology Progress Note    Margaret Silverio Patient Status:  Inpatient    3/14/1945 MRN [id number]   Location [facility] 3W/SW Attending Regan Cruz MD   Hosp Day # 3 PCP Johnathon Rodriguez DO       Subjective:   No evidence of GI bleeding last 24 hours.    Unable to tolerate large volume bowel prep --     Hemoglobin improved.    Objective:   Blood pressure 145/90, pulse 60, temperature 98 °F (36.7 °C), temperature source Oral, resp. rate 18, height 5' 5\" (1.651 m), weight 151 lb (68.5 kg), SpO2 91%. Body mass index is 25.13 kg/m².    Gen: awake, alert patient, NAD  HEENT: EOMI, the sclera appears anicteric, oropharynx clear, mucus membranes appear moist  CV: RRR  Lung: no conversational dyspnea   Abdomen: soft, non-tender.  Skin: dry, warm, no jaundice  Ext: no LE edema is evident  Neuro: Alert and interactive  Psych: calm, cooperative    Results:     Lab Results   Component Value Date    WBC 3.0 (L) 2024    HGB 9.6 (L) 2024    HCT 29.3 (L) 2024    PLT 88.0 (L) 2024    CREATSERUM 1.20 (H) 2024    BUN 13 2024     2024    K 3.9 2024     2024    CO2 30.0 2024    GLU 90 2024    CA 8.5 (L) 2024    ALB 3.3 2024    ALKPHO 110 2024    BILT 1.6 (H) 2024    TP 5.6 (L) 2024    AST 38 (H) 2024    ALT 15 2024    PTT 42.1 (H) 01/15/2024    INR 1.60 (H) 01/15/2024    T4F 1.2 2024    TSH 19.180 (H) 2024    LIP 32 2024    MG 1.4 (L) 2024    PHOS 2.7 2024    PHOS 2.7 2024    B12 >2,000 (H) 2024       XR CHEST AP PORTABLE  (CPT=71045)    Result Date: 2024  CONCLUSION:  1. Redemonstration of mild cardiomegaly and mild pulmonary vascular congestion with interstitial edema suggesting cardiac failure/fluid overload.  Worsening moderate opacification of the right lung base suggesting worsening right pleural  effusion which may in part be loculated, and I can not exclude right basal pneumonia and or atelectasis.  Follow-up studies are advised.  Mild blunting of left costophrenic angle.    Dictated by (CST): Josue Hogan MD on 1/16/2024 at 9:01 AM     Finalized by (CST): Josue Hogan MD on 1/16/2024 at 9:03 AM             Assessment and Plan:   Margaret Silverio is a 78 year old woman with hx of afib on anticoagulation, HTN, seizure disorder who presented to [facility] with SOB and was subsequently found to have acute on chronic anemia. The patient also reported having one to two dark/bloody stools prior to admission.    She has a hx of GI bleed in 4/2023 with EGD done showing a potential AVM in the duodenal bulb s/p clip placement.     Given recurrent presentations with acute on chronic anemia and the need for anticoagulation, should perform a colonoscopy in addition to EGD to rule out a lower GI source of bleeding.     The patient was scheduled for colonoscopy and EGD today but unable to tolerate bowel prep.    Will give small volume bowel preparation tonight/tomorrow and plan for Egd/Colonoscopy tomorrow.    *Respiratory status significantly improved -- on room air.     Recommend:  -EGD/cln tomorrow.  -plenvu now and tomorrow morning.  -Continue PPI therapy  -clear liquid diet tonight.  -NPO at midnight   -*Platelets improved today, if < 50k tomorrow, will transfuse 1 unit prior to procedure.    EGD consent: I have discussed the risks, benefits, and alternatives to upper endoscopy/enteroscopy with the patient/primary decision maker [who demonstrated understanding], including but not limited to the risks of bleeding, infection, pain, death, as well as the risks of anesthesia and perforation all leading to prolonged hospitalization, surgical intervention, or even death. I also specifically mentioned the miss rate of upper endoscopy of 5-10% in the best of all circumstances.  The patient has agreed to sign an  informed consent and elected to proceed with procedure with possible intervention [i.e. polypectomy, stent placement, etc.] as indicated.    Colonoscopy consent: I have discussed the risks, benefits, and alternatives to colonoscopy with the patient/primary decision maker [who demonstrated understanding], including but not limited to the risks of bleeding, infection, pain, death, as well as the risks of anesthesia and perforation all leading to prolonged hospitalization, surgical intervention, or even death. I also specifically mentioned the miss rate of colonoscopy of 5-10% in the best of all circumstances. The patient has agreed to sign an informed consent and elected to proceed with colonoscopy with possible intervention [i.e. polypectomy, stent placement, etc.] as indicated.        Jonel Barrientos MD  [facility]

## 2024-01-17 NOTE — INTERVAL H&P NOTE
Pre-op Diagnosis: Anemia, melena    The above referenced H&P was reviewed by Zoraida Douglass Ma, MD on 1/17/2024, the patient was examined and no significant changes have occurred in the patient's condition since the H&P was performed.  I discussed with the patient and/or legal representative the potential benefits, risks and side effects of this procedure; the likelihood of the patient achieving goals; and potential problems that might occur during recuperation.  I discussed reasonable alternatives to the procedure, including risks, benefits and side effects related to the alternatives and risks related to not receiving this procedure.  We will proceed with procedure as planned.

## 2024-01-17 NOTE — PROGRESS NOTES
[facility]    Progress Note    Margaret Silverio Patient Status:  Inpatient    3/14/1945 MRN [id number]   Location [facility] 2W/SW Attending Regan Cruz MD   Hosp Day # 4 PCP Johnathon Rodriguez DO     Chief Complaint:   Chief Complaint   Patient presents with    Difficulty Breathing   Diarrhea, black stools.     Subjective:   Margaret Silverio did not tolerate Golytely, but was able to take Plenvu for bowel prep. She is ready for endoscopy this morning.    Per RN no events overnight   Objective:   Objective:    Blood pressure 157/87, pulse 60, temperature 98.1 °F (36.7 °C), temperature source Oral, resp. rate 20, height 5' 5\" (1.651 m), weight 146 lb (66.2 kg), SpO2 98%.    Physical Exam:    General: No acute distress.   Respiratory: Clear to auscultation bilaterally. No wheezes. No rhonchi.  Cardiovascular: S1, S2. Regular rate and rhythm. No rubs or gallops. + 2/6 Systolic M LLSB  Abdomen: Soft, nontender, nondistended.  Positive bowel sounds. No rebound or guarding.  Neurologic: No focal neurological deficits.   Musculoskeletal: Moves all extremities.  Extremities: LUE edema and tenderness near prev IV site    Results:   Results:    Labs:  Recent Labs   Lab 24  2059 24  2100 24  0818 24  1358 24  1821 24  0412 01/15/24  0653 24  0133 24  0519 24  0531   WBC  --    < >  --   --  3.9* 3.1* 2.7*  --  3.0* 2.8*   HGB  --    < >  --    < > 9.3* 8.9* 8.8* 7.9* 9.6* 8.6*   MCV  --    < >  --   --  90.1 92.9 94.2  --  96.1 96.2   PLT  --    < >  --   --  26.0* 35.0* 32.0*  --  88.0* 90.0*   INR 5.24*  --  5.10*  --  3.63* 2.60* 1.60*  --   --   --     < > = values in this interval not displayed.       Recent Labs   Lab 24  0536 24  0412 01/15/24  0653 24  0519 24  0531   GLU 47* 200*   < > 90 90 92   BUN 27* 26*   < > 16 13 12   CREATSERUM 1.57* 1.32*   < > 1.14* 1.20* 1.27*   CA 9.0 8.2*   <  > 8.3* 8.5* 9.2   ALB 3.6 3.0*   < > 3.0* 3.3 3.4   * 139   < > 138 138 145   K 5.0 3.4*   < > 3.2* 3.9 3.0*    103   < > 104 102 108   CO2 15.0* 27.0   < > 28.0 30.0 30.0   ALKPHO 135 110  --   --   --   --    AST 38* 38*  --   --   --   --    ALT 17 15  --   --   --   --    BILT 2.2* 1.6*  --   --   --   --    TP 6.8 5.6*  --   --   --   --     < > = values in this interval not displayed.       Estimated Creatinine Clearance: 33.4 mL/min (A) (based on SCr of 1.27 mg/dL (H)).    Recent Labs   Lab 01/13/24  1821 01/14/24  0412 01/15/24  0653   PTP 38.4* 29.4* 20.0*   INR 3.63* 2.60* 1.60*              Culture:  Hospital Encounter on 01/12/24   1. Blood Culture     Status: None (Preliminary result)    Collection Time: 01/12/24  9:33 PM    Specimen: Blood,peripheral   Result Value Ref Range    Blood Culture Result No Growth 4 Days N/A   2. Urine Culture, Routine     Status: None    Collection Time: 01/12/24  8:53 PM    Specimen: Urine, clean catch   Result Value Ref Range    Urine Culture No Growth at 18-24 hrs. N/A       Cardiac  No results for input(s): \"TROP\", \"PBNP\" in the last 168 hours.      Imaging: Imaging data reviewed in [facility].  XR CHEST AP PORTABLE  (CPT=71045)    Result Date: 1/16/2024  CONCLUSION:  1. Redemonstration of mild cardiomegaly and mild pulmonary vascular congestion with interstitial edema suggesting cardiac failure/fluid overload.  Worsening moderate opacification of the right lung base suggesting worsening right pleural effusion which may in part be loculated, and I can not exclude right basal pneumonia and or atelectasis.  Follow-up studies are advised.  Mild blunting of left costophrenic angle.    Dictated by (CST): Josue Hogan MD on 1/16/2024 at 9:01 AM     Finalized by (CST): Josue Hogan MD on 1/16/2024 at 9:03 AM           Medications:    potassium phosphate dibasic 15 mmol in sodium chloride 0.9% 250 mL IVPB  15 mmol Intravenous Once    Followed by    potassium chloride   40 mEq Intravenous Once    sodium chloride  500 mL Intravenous Once    nitroglycerin  0.4 mg Sublingual Once    metoprolol  5 mg Intravenous See Admin Instructions    Or    dilTIAZem  5 mg Intravenous See Admin Instructions    furosemide  20 mg Intravenous BID (Diuretic)    amitriptyline  25 mg Oral Nightly    carvedilol  25 mg Oral BID    sacubitril-valsartan  1 tablet Oral BID    piperacillin-tazobactam  3.375 g Intravenous Q8H    pantoprazole  40 mg Intravenous Q12H         Assessment and Plan:   Margaret Silverio is a 78-year-old female with a history of atrial fibrillation on Eliquis, hypertension, chronic systolic heart failure and status post pacemaker placement earlier in September, who p/w shortness of breath fatigue and melanotic stools for a week.       Acute upper GI bleed   Acute blood loss anemia  Chronic anemia   - Patient with 1 week of diarrhea and melanotic stools PTA  - Hb 7.1 lowest. Trending up now   - GI on consult.   - hold Eliquis.   - transfuse for Hb < 7.0 or PLT < 10K or < 50K with active bleeding   - s/p FFP 1 unit and 1 unit PRBC.   - h/o EGD 04/23' with AVM s/p hemoclip   - EGD/CLN today    Acute hypoxemic respiratory failure   - secondary to CHF and RLL PNA   - off NIPPV. Off o2 NC   - Pulmonary on consult.   - Lasix 20mg IV BID   - IV zosyn empirically   - blood cx 2/2 NGTD. Genexpert neg,     Acute pancytopenia   - Likely related to severe sepsis on admit.   - retic ct 13, B12 ok, folate pending. Hemolysis labs negative.   - Given PRBC and will give PLT today x 2 units   - HONC on consult     Severe sepsis   - secondary to PNA   - Lactic acid 12 on admit --> 4.8 will rpt   - blood cx x 2 neg   - CXR possible RLL PNA   - IV zosyn   - CT chest small loculated R and small L pleural effusion no consolidation. CT abd pelvis chronic pancreatitis   - C.diff and GI panel pending   - Ucx negative     Acute on Chronic combined CHF   Severe Tricuspid valve regurgitation  Mitral valve  bioprosthesis  NICM s/p ICD 9/2023  - ECHO from 10/2023 with LVEF 37% grade 4 TR.   - Cards consult.   - Lasix 20mg IV BID   - coreg 25mg BID   - Entresto   - daily weights , I/O     Paroxysmal A.fib   - hold home Eiquis   - rate controlled.   - consider eventual outpt watchman with recurrent GI bleed    Acute kidney injury on CKD III  - Improved.   - Possible ATN from hypoperfusion/anemia vs sepsis  - baseline creat 1.3 per EMR   - Monitor UOP   - UA with hyaline casts indicative of decreased renal perfusion     LUE edema  - elevate. Hot packs   - US negative for DVT.   - IV infiltrated overnight 1/13     Other medical problems  H/o RA  H/o small bowel AVM's    >55min spent, >50% spent counseling and coordinating care in the form of educating pt/family and d/w consultants and staff. Most of the time spent discussing the above plan.      Rashaad Nicholson MD, PhD  Message via Secure Chat  [facility] Hospitalist        Supplementary Documentation:     Quality:  DVT Prophylaxis: SCD  CODE status: Full  Dispo: per clinical course    Estimated date of discharge: TBD  Discharge is dependent on: clinical stability  At this point Ms. Silverio is expected to be discharge to: home

## 2024-01-17 NOTE — PROGRESS NOTES
01/17/24 1423   Clinical Encounter Type   Visited With Patient not available;Health care provider   Routine Visit Follow-up   Patient's Supportive Strategies/Resources Advanced Directives/ POAHC   Referral From Nurse   Referral To      Summary:  received Epic consult for POA/ Advanced Directives, checked EMR and found none, and spoke with nurse. Writer attempted to visit with patient but they were gone for procedure. Spiritual Care support can be requested via an Epic consult.     -Chaplain Albertina.

## 2024-01-17 NOTE — PLAN OF CARE
Bowel prep completed. Patient had BMs throughout night. Norco administered for pain. Plan EGD/colonoscopy today and potential CTA.    Problem: Patient Centered Care  Goal: Patient preferences are identified and integrated in the patient's plan of care  Description: Interventions:  - What would you like us to know as we care for you? From home with spouse  - Provide timely, complete, and accurate information to patient/family  - Incorporate patient and family knowledge, values, beliefs, and cultural backgrounds into the planning and delivery of care  - Encourage patient/family to participate in care and decision-making at the level they choose  - Honor patient and family perspectives and choices  Outcome: Progressing     Problem: Patient/Family Goals  Goal: Patient/Family Long Term Goal  Description: Patient's Long Term Goal: Discharge home     Interventions:  - monitor vs, I&o, pain per protocol   - See additional Care Plan goals for specific interventions  Outcome: Progressing  Goal: Patient/Family Short Term Goal  Description: Patient's Short Term Goal: manage risk of bleeding    Interventions:   - monitor vs, assess pain, ambulate as tolerated  - See additional Care Plan goals for specific interventions  Outcome: Progressing     Problem: RISK FOR INFECTION - ADULT  Goal: Absence of fever/infection during anticipated neutropenic period  Description: INTERVENTIONS  - Monitor WBC  - Administer growth factors as ordered  - Implement neutropenic guidelines  Outcome: Progressing     Problem: SAFETY ADULT - FALL  Goal: Free from fall injury  Description: INTERVENTIONS:  - Assess pt frequently for physical needs  - Identify cognitive and physical deficits and behaviors that affect risk of falls.  - [place] fall precautions as indicated by assessment.  - Educate pt/family on patient safety including physical limitations  - Instruct pt to call for assistance with activity based on assessment  - Modify environment to  reduce risk of injury  - Provide assistive devices as appropriate  - Consider OT/PT consult to assist with strengthening/mobility  - Encourage toileting schedule  Outcome: Progressing     Problem: DISCHARGE PLANNING  Goal: Discharge to home or other facility with appropriate resources  Description: INTERVENTIONS:  - Identify barriers to discharge w/pt and caregiver  - Include patient/family/discharge partner in discharge planning  - Arrange for needed discharge resources and transportation as appropriate  - Identify discharge learning needs (meds, wound care, etc)  - Arrange for interpreters to assist at discharge as needed  - Consider post-discharge preferences of patient/family/discharge partner  - Complete POLST form as appropriate  - Assess patient's ability to be responsible for managing their own health  - Refer to Case Management Department for coordinating discharge planning if the patient needs post-hospital services based on physician/LIP order or complex needs related to functional status, cognitive ability or social support system  Outcome: Progressing     Problem: RESPIRATORY - ADULT  Goal: Achieves optimal ventilation and oxygenation  Description: INTERVENTIONS:  - Assess for changes in respiratory status  - Assess for changes in mentation and behavior  - Position to facilitate oxygenation and minimize respiratory effort  - Oxygen supplementation based on oxygen saturation or ABGs  - Provide Smoking Cessation handout, if applicable  - Encourage broncho-pulmonary hygiene including cough, deep breathe, Incentive Spirometry  - Assess the need for suctioning and perform as needed  - Assess and instruct to report SOB or any respiratory difficulty  - Respiratory Therapy support as indicated  - Manage/alleviate anxiety  - Monitor for signs/symptoms of CO2 retention  Outcome: Progressing     Problem: HEMATOLOGIC - ADULT  Goal: Maintains hematologic stability  Description: INTERVENTIONS  - Assess for signs and  symptoms of bleeding or hemorrhage  - Monitor labs and vital signs for trends  - Administer supportive blood products/factors, fluids and medications as ordered and appropriate  - Administer supportive blood products/factors as ordered and appropriate  Outcome: Progressing  Goal: Free from bleeding injury  Description: (Example usage: patient with low platelets)  INTERVENTIONS:  - Avoid intramuscular injections, enemas and rectal medication administration  - Ensure safe mobilization of patient  - Hold pressure on venipuncture sites to achieve adequate hemostasis  - Assess for signs and symptoms of internal bleeding  - Monitor lab trends  - Patient is to report abnormal signs of bleeding to staff  - Avoid use of toothpicks and dental floss  - Use electric shaver for shaving  - Use soft bristle tooth brush  - Limit straining and forceful nose blowing  Outcome: Progressing     Problem: GASTROINTESTINAL - ADULT  Goal: Maintains or returns to baseline bowel function  Description: INTERVENTIONS:  - Assess bowel function  - Maintain adequate hydration with IV or PO as ordered and tolerated  - Evaluate effectiveness of GI medications  - Encourage mobilization and activity  - Obtain nutritional consult as needed  - Establish a toileting routine/schedule  - Consider collaborating with pharmacy to review patient's medication profile  Outcome: Progressing

## 2024-01-17 NOTE — PAYOR COMM NOTE
--------------  CONTINUED STAY REVIEW    Payor: [employer]  Subscriber #:  [id number]  Authorization Number: [id number]    Admit date: 1/13/24  Admit time: 12:44 AM    Admitting Physician: Regan Cruz MD  Attending Physician:  Rashaad Nicholson MD  Primary Care Physician: Johnathon Rodriguez DO    REVIEW DOCUMENTATION:  1-16-24    ELECTROPHYSIOLOGY CONSULT NOTE     Impression:      GI bleeding  Anticoagulation held  History of paroxysmal atrial fibrillation and heart failure [id number] or equal to 5 high risk for stroke  Given the GI bleeding and high risk for stroke recommend watchman as was recommended as an outpatient  Can do CTA focus of the left atrial appendage in the hospital to assess candidacy while here consider scheduling outpatient Watchman which is more interested then when was discussed as outpatient previously     ICD dual-chamber  Placed follow-up 2023 for Risser sign cardiac death     Heart failure with reduced ejection fraction  EF was 33%  On goal-directed medical therapy     Recommendations:     CTA attention left atrial appendage pulmonary veins for assessment of candidacy for watchman outpatient     Reason for Consultation:      Consideration for EP watchman evaluation with GI bleed history of A-fib    Physical Exam:     General: Alert and oriented. No apparent distress. No respiratory or constitutional distress.  HEENT: Normocephalic, anicteric sclera, neck supple  Neck: No JVD, carotids 2+, supple  Cardiac: Regular rate. No pathologic murmur.  Lungs: Clear with normal effort.  Normal excursions and effort.  Abdomen: Soft, non-tender. BS-present.  Extremities: Without clubbing, cyanosis.  Peripheral pulsespresent.  Neurologic: Alert and oriented, normal affect. Motor ok.  Skin: Warm and dry.      Results:      Laboratory Data:        Lab Results   Component Value Date     WBC 3.0 (L) 01/16/2024     HGB 9.6 (L) 01/16/2024     HCT 29.3 (L) 01/16/2024     PLT 88.0 (L) 01/16/2024      CREATSERUM 1.20 (H) 01/16/2024     BUN 13 01/16/2024      01/16/2024     K 3.9 01/16/2024      01/16/2024     CO2 30.0 01/16/2024     GLU 90 01/16/2024     CA 8.5 (L) 01/16/2024     ALB 3.3 01/16/2024             MEDICATIONS ADMINISTERED IN LAST 1 DAY:  amitriptyline (Elavil) tab 25 mg       Date Action Dose Route User    1/16/2024 2209 Given 25 mg Oral Tara Crane RN          carvedilol (Coreg) tab 25 mg       Date Action Dose Route User    1/17/2024 0847 Given 25 mg Oral Angy Durham RN    1/16/2024 2204 Given 25 mg Oral Tara Crane RN          furosemide (Lasix) 10 mg/mL injection 20 mg       Date Action Dose Route User    1/17/2024 0848 Given 20 mg Intravenous Angy Durham RN    1/16/2024 1614 Given 20 mg Intravenous Angy Durham RN          furosemide (Lasix) 10 mg/mL injection 20 mg       Date Action Dose Route User    1/16/2024 1103 Given 20 mg Intravenous Angy Durham RN          HYDROcodone-acetaminophen (Norco) 5-325 MG per tab 1 tablet       Date Action Dose Route User    1/17/2024 0518 Given by Other 1 tablet Oral Tara Crane RN    1/16/2024 2230 Given 1 tablet Oral Tara Crane RN    1/16/2024 1619 Given 1 tablet Oral Angy Durham RN          metoprolol tartrate (Lopressor) tab 50 mg       Date Action Dose Route User    1/16/2024 2200 Given 50 mg Oral Tara Crane RN          metoprolol tartrate (Lopressor) tab 50 mg       Date Action Dose Route User    1/17/2024 0518 Given by Other 50 mg Oral Tara Crane RN          pantoprazole (Protonix) 40 mg in sodium chloride 0.9% PF 10 mL IV push       Date Action Dose Route User    1/17/2024 0848 Given 40 mg Intravenous Angy Durham RN    1/16/2024 2158 Given by Other 40 mg Intravenous Neimann, Tara, RN          polyethylene glycol-electrolyte solution (Plenvu) 140 g SOLR 480 mL       Date Action Dose Route User    1/17/2024 0200 Given 480 mL Oral Tara Crane, RN     1/16/2024 1806 Given 480 mL Oral Angy Durham RN          piperacillin-tazobactam (Zosyn) 3.375 g in dextrose 5% 100 mL IVPB-ADDV       Date Action Dose Route User    1/17/2024 0514 New Bag 3.375 g Intravenous Tara Crane RN    1/16/2024 2155 New Bag 3.375 g Intravenous Tara Crane RN    1/16/2024 1103 New Bag 3.375 g Intravenous Angy Durham RN          potassium phosphate dibasic 15 mmol in sodium chloride 0.9% 250 mL IVPB       Date Action Dose Route User    1/17/2024 0852 New Bag 15 mmol Intravenous Angy Durham RN          sacubitril-valsartan (Entresto) 24-26 MG per tab 1 tablet       Date Action Dose Route User    1/17/2024 0848 Given 1 tablet Oral Angy Durham RN    1/16/2024 2204 Given 1 tablet Oral Tara Crane RN          sodium chloride 0.9 % IV bolus 500 mL       Date Action Dose Route User    1/17/2024 0732 New Bag 500 mL Intravenous Angy Durham RN            Vitals (last day)       Date/Time Temp Pulse Resp BP SpO2 Weight O2 Device O2 Flow Rate (L/min) [facility]    01/17/24 0847 98.6 °F (37 °C) 60 20 153/81 99 % -- None (Room air) --     01/17/24 0512 98.1 °F (36.7 °C) 60 20 157/87 98 % -- None (Room air) -- [place]    01/17/24 0432 -- -- -- -- -- 146 lb -- --     01/16/24 2154 97.8 °F (36.6 °C) 60 20 154/85 97 % -- None (Room air) -- [place]    01/16/24 1614 -- -- -- 145/90 -- -- -- --     01/16/24 1443 98 °F (36.7 °C) 60 18 133/76 91 % -- None (Room air) --     01/16/24 0818 97.9 °F (36.6 °C) 60 18 136/62 97 % -- None (Room air) -- JR    01/16/24 0452 97.7 °F (36.5 °C) 60 20 129/81 97 % -- None (Room air) -- [place]    01/16/24 0430 -- -- -- -- -- 151 lb -- -- TB    01/16/24 0030 98.2 °F (36.8 °C) 60 18 121/67 99 % -- None (Room air) -- [place]              Blood Transfusion Record       Product Unit Status Volume Start End            Transfuse RBC       23  [id number]  [id number] Completed 01/13/24 1611 487.5 mL 01/13/24 1115 01/13/24 1345                Transfuse  fresh frozen plasma       23  [id number]  [id number] Completed 01/13/24 0145 449.67 mL 01/13/24 0010 01/13/24 0145                Transfuse platelets       23  [id number]  [id number] Stopped 220 mL 01/15/24 2157 01/16/24 0030       23  [id number]  [id number] Stopped 200 mL 01/15/24 1820 01/15/24 2020

## 2024-01-17 NOTE — PROGRESS NOTES
[facility]     Progress Note    Margaret Silverio Patient Status:  Inpatient    3/14/1945 MRN [id number]   Location [facility] 2W/SW Attending Regan Cruz MD   Hosp Day # 4 PCP Johnathon Rodriguez,        Subjective:   Patient seen and examined.  Denies significant dyspnea.  No episodes of GI bleeding noted.    Objective:   Blood pressure 145/84, pulse 60, temperature 98.6 °F (37 °C), temperature source Oral, resp. rate 12, height 5' 5\" (1.651 m), weight 146 lb (66.2 kg), SpO2 98%.  Intake/Output:   Last 3 shifts: I/O last 3 completed shifts:  In: 3856 [P.O.:2460; I.V.:476; Blood:420; IV PIGGYBACK:500]  Out: 1750 [Urine:1400; Stool:350]   This shift: I/O this shift:  In: -   Out: 400 [Urine:400]     Vent Settings:      Hemodynamic parameters (last 24 hours):      Scheduled Meds:         Continuous Infusions:     Physical Exam  Constitutional: no acute distress  Eyes: PERRL  ENT: nares pateint  Neck: supple, no JVD  Cardio: RRR, S1 S2  Respiratory: clear to auscultation bilaterally, no wheezing, rales, rhonchi, crackles  GI: abdomen soft, non tender, active bowel sounds, no organomegaly  Extremities: no clubbing, cyanosis, edema  Neurologic: no gross motor deficits  Skin: warm, dry      Results:     Lab Results   Component Value Date    WBC 2.8 2024    HGB 8.6 2024    HCT 27.6 2024    PLT 90.0 2024    CREATSERUM 1.27 2024    BUN 12 2024     2024    K 3.0 2024     2024    CO2 30.0 2024    GLU 92 2024    CA 9.2 2024    ALB 3.4 2024    MG 1.8 2024    PHOS 2.5 2024       CT CARDIAC OVER READ    Result Date: 2024  CONCLUSION:   Cardiac over-read performed. Please see the separately dictated cardiologist report for further details.  Small to moderate loculated right pleural effusion stable since the prior study with mild probable partial right lower lobe adjacent passive atelectasis.   Trace left pleural effusion also unchanged since the prior study.    Dictated by (CST): Azar Mendez MD on 1/17/2024 at 12:49 PM     Finalized by (CST): Azar Mendez MD on 1/17/2024 at 12:53 PM          XR CHEST AP PORTABLE  (CPT=71045)    Result Date: 1/16/2024  CONCLUSION:  1. Redemonstration of mild cardiomegaly and mild pulmonary vascular congestion with interstitial edema suggesting cardiac failure/fluid overload.  Worsening moderate opacification of the right lung base suggesting worsening right pleural effusion which may in part be loculated, and I can not exclude right basal pneumonia and or atelectasis.  Follow-up studies are advised.  Mild blunting of left costophrenic angle.    Dictated by (CST): Josue Hogan MD on 1/16/2024 at 9:01 AM     Finalized by (CST): Josue Hogan MD on 1/16/2024 at 9:03 AM                 Assessment   1.  Acute hypoxemic respiratory failure  2.  Possible GI bleed  3.  Pancytopenia  4.  Lactic acidosis  5.  Acute kidney injury  6.  Hypokalemia  7.  Atrial fibrillation  8.  CHF  9.  Coagulopathy  10.  Nonischemic cardiomyopathy  11.  Rheumatoid arthritis     Plan   -Patient presents with evidence of fatigue, dyspnea and concern for melanotic stools over the last several days plan for endoscopic evaluation today  -Oxygenation requirements improved during hospital course.    -CT chest on presentation with no pulmonary embolism seen.  Small loculated right and small left pleural effusion seen.  No significant consolidation seen  -Empiric IV antibiotic therapy at this time.  -Transfuse for hemoglobin below 7.   -Recommendations per GI  -Closely monitor renal function and output  -Hold anticoagulation at this time  -DVT prophylaxis: Eli Parsons, DO  Pulmonary Critical Care Medicine  [facility]

## 2024-01-17 NOTE — OR PREOP
Per [employer] rep, ok to use magnet. For ICDs, magnet application suspends high voltage therapy. Newer devices may make an audible tone upon magnet application and removal. The device will return back to normal once magnet is removed.

## 2024-01-18 ENCOUNTER — APPOINTMENT (OUTPATIENT)
Dept: CT IMAGING | Facility: HOSPITAL | Age: 79
End: 2024-01-18
Attending: INTERNAL MEDICINE
Payer: MEDICARE

## 2024-01-18 LAB
ALBUMIN SERPL-MCNC: 3.3 G/DL (ref 3.2–4.8)
ANION GAP SERPL CALC-SCNC: 9 MMOL/L (ref 0–18)
BASOPHILS # BLD AUTO: 0.02 X10(3) UL (ref 0–0.2)
BASOPHILS NFR BLD AUTO: 0.8 %
BUN BLD-MCNC: 11 MG/DL (ref 9–23)
BUN/CREAT SERPL: 8.4 (ref 10–20)
CALCIUM BLD-MCNC: 9 MG/DL (ref 8.7–10.4)
CHLORIDE SERPL-SCNC: 111 MMOL/L (ref 98–112)
CO2 SERPL-SCNC: 27 MMOL/L (ref 21–32)
CREAT BLD-MCNC: 1.31 MG/DL
DEPRECATED RDW RBC AUTO: 60.2 FL (ref 35.1–46.3)
EGFRCR SERPLBLD CKD-EPI 2021: 42 ML/MIN/1.73M2 (ref 60–?)
EOSINOPHIL # BLD AUTO: 0.29 X10(3) UL (ref 0–0.7)
EOSINOPHIL NFR BLD AUTO: 12.2 %
ERYTHROCYTE [DISTWIDTH] IN BLOOD BY AUTOMATED COUNT: 19.1 % (ref 11–15)
GLUCOSE BLD-MCNC: 100 MG/DL (ref 70–99)
HCT VFR BLD AUTO: 25.7 %
HGB BLD-MCNC: 8.1 G/DL
IMM GRANULOCYTES # BLD AUTO: 0.02 X10(3) UL (ref 0–1)
IMM GRANULOCYTES NFR BLD: 0.8 %
LYMPHOCYTES # BLD AUTO: 0.57 X10(3) UL (ref 1–4)
LYMPHOCYTES NFR BLD AUTO: 23.9 %
MAGNESIUM SERPL-MCNC: 1.6 MG/DL (ref 1.6–2.6)
MCH RBC QN AUTO: 30.2 PG (ref 26–34)
MCHC RBC AUTO-ENTMCNC: 31.5 G/DL (ref 31–37)
MCV RBC AUTO: 95.9 FL
MONOCYTES # BLD AUTO: 0.15 X10(3) UL (ref 0.1–1)
MONOCYTES NFR BLD AUTO: 6.3 %
NEUTROPHILS # BLD AUTO: 1.33 X10 (3) UL (ref 1.5–7.7)
NEUTROPHILS # BLD AUTO: 1.33 X10(3) UL (ref 1.5–7.7)
NEUTROPHILS NFR BLD AUTO: 56 %
OSMOLALITY SERPL CALC.SUM OF ELEC: 303 MOSM/KG (ref 275–295)
PHOSPHATE SERPL-MCNC: 2.8 MG/DL (ref 2.4–5.1)
PHOSPHATE SERPL-MCNC: 2.8 MG/DL (ref 2.4–5.1)
PLATELET # BLD AUTO: 83 10(3)UL (ref 150–450)
POTASSIUM SERPL-SCNC: 3.4 MMOL/L (ref 3.5–5.1)
POTASSIUM SERPL-SCNC: 3.4 MMOL/L (ref 3.5–5.1)
RBC # BLD AUTO: 2.68 X10(6)UL
SODIUM SERPL-SCNC: 147 MMOL/L (ref 136–145)
WBC # BLD AUTO: 2.4 X10(3) UL (ref 4–11)

## 2024-01-18 PROCEDURE — 99232 SBSQ HOSP IP/OBS MODERATE 35: CPT | Performed by: INTERNAL MEDICINE

## 2024-01-18 PROCEDURE — 99233 SBSQ HOSP IP/OBS HIGH 50: CPT | Performed by: INTERNAL MEDICINE

## 2024-01-18 PROCEDURE — 99233 SBSQ HOSP IP/OBS HIGH 50: CPT | Performed by: HOSPITALIST

## 2024-01-18 PROCEDURE — 74176 CT ABD & PELVIS W/O CONTRAST: CPT | Performed by: INTERNAL MEDICINE

## 2024-01-18 RX ORDER — FUROSEMIDE 10 MG/ML
40 INJECTION INTRAMUSCULAR; INTRAVENOUS DAILY
Status: DISCONTINUED | OUTPATIENT
Start: 2024-01-18 | End: 2024-01-20

## 2024-01-18 NOTE — PROGRESS NOTES
Progress Note  Margaret Silverio Patient Status:  Inpatient    3/14/1945 MRN [id number]   Location [facility] 3W/SW Attending Katiana Ochoa MD   Hosp Day # 5 PCP Johnathon Rodriguez,      Subjective:  In bed on exam. Just returned from CT. C/o abdominal pain.     Objective:  /83 (BP Location: Left arm)   Pulse 60   Temp 97.2 °F (36.2 °C) (Oral)   Resp 18   Ht 5' 5\" (1.651 m)   Wt 146 lb (66.2 kg)   SpO2 96%   BMI 24.30 kg/m²     Telemetry: av paced      Intake/Output:    Intake/Output Summary (Last 24 hours) at 2024 1237  Last data filed at 2024 0912  Gross per 24 hour   Intake 1690 ml   Output 900 ml   Net 790 ml       Last 3 Weights   24 0535 146 lb (66.2 kg)   24 1312 146 lb (66.2 kg)   24 0432 146 lb (66.2 kg)   24 0430 151 lb (68.5 kg)   01/15/24 0536 153 lb (69.4 kg)   24 0119 150 lb 5.7 oz (68.2 kg)   244 140 lb (63.5 kg)   23 1412 145 lb (65.8 kg)   23 0818 145 lb (65.8 kg)       Labs:  Recent Labs   Lab 24  0519 24  0531 24  0613   GLU 90 92 100*   BUN 13 12 11   CREATSERUM 1.20* 1.27* 1.31*   EGFRCR 47* 44* 42*   CA 8.5* 9.2 9.0    145 147*   K 3.9 3.0* 3.4*  3.4*    108 111   CO2 30.0 30.0 27.0     Recent Labs   Lab 24  0536 24  1358 24  0519 24  0531 24  0613   RBC 2.32*   < > 3.05* 2.87* 2.68*   HGB 7.1*   < > 9.6* 8.6* 8.1*   HCT 22.1*   < > 29.3* 27.6* 25.7*   MCV 95.3   < > 96.1 96.2 95.9   MCH 30.6   < > 31.5 30.0 30.2   MCHC 32.1   < > 32.8 31.2 31.5   RDW 17.0*   < > 18.2* 18.1* 19.1*   NEPRELIM 2.36  --   --  1.90 1.33*   WBC 3.0*   < > 3.0* 2.8* 2.4*   PLT 38.0*   < > 88.0* 90.0* 83.0*    < > = values in this interval not displayed.         Recent Labs   Lab 249   TROPHS 32       Review of Systems   Cardiovascular: Negative.    Respiratory: Negative.     Gastrointestinal:  Positive for abdominal pain.       Physical  Exam:    Gen: alert, oriented x 3, NAD  Heent: pupils equal, reactive. Mucous membranes moist.   Neck: no jvd  Cardiac: regular rate and rhythm, normal S1,S2  Lungs: CTA  Abd: soft, NT/ND +bs  Ext: no edema  Skin: Warm, dry  Neuro: No focal deficits        Medications:     potassium chloride  40 mEq Intravenous Once    [Held by provider] furosemide  20 mg Intravenous BID (Diuretic)    amitriptyline  25 mg Oral Nightly    carvedilol  25 mg Oral BID    sacubitril-valsartan  1 tablet Oral BID    piperacillin-tazobactam  3.375 g Intravenous Q8H    pantoprazole  40 mg Intravenous Q12H             Assessment:  Acute GIB presented with melena  Hgb 8.1 today  EGD/colon 1/17--small transvrse colon AVM s/p tx  Prior hx AVM 4/2023  PAF-av paced.  Eliquis on hold given above  Consideration for Watchman. CTA here did not clearly visualize the BRANDYN  Need records from [facility] to determine if closure device used during mitral valve surgery ([facility])  S/p [employer] dual chamber ICD 9/2023  Chronic HFrEF 35-40%-compensated on exam.  Bb, arni  Hx bioprosthetic MVR 2017  Respiratory failure, possible pna  Abx pe primary  HTN-fair control  RA  Tcp-plt 83 today      Plan:  CT abd/pelvis pending today for abd pain.  Lasix held this am with rising creat. Will resume home lasix once improved. Appears compensated on exam.  Bp stable on usual bb, entresto.  AC remains on hold. Will need OP consideration for Watchman with Dr. Sargent.     Plan of care discussed with patient, RN.    Susi Garcia, APRN  1/18/2024  12:37 PM  [phone]  M [phone]      L3  Seen and examined bedside. Agree with the present management, will follow with you.    BNP level 15,300.  Moderate LV dysfunction on echocardiogram resume IV Lasix 40 mg daily.  Daily BMP  Sepsis management as per primary team elevated lactic acid on Zosyn   C. difficile panel pending.  Continue to hold anticoagulation.  Blood pressure stable continue  beta-blocker and Entresto.  Abdominal pain as per primary team CT shows chronic pancreatitis no evidence of obstruction.  Thank you for allowing me to participate in the care of your patient, feel free to contact me if you have any questions.    Tyler Vaz MD  [facility]  Interventional Cardiology.  [phone]

## 2024-01-18 NOTE — PLAN OF CARE
Problem: Patient Centered Care  Goal: Patient preferences are identified and integrated in the patient's plan of care  Description: Interventions:  - What would you like us to know as we care for you? From home with spouse  - Provide timely, complete, and accurate information to patient/family  - Incorporate patient and family knowledge, values, beliefs, and cultural backgrounds into the planning and delivery of care  - Encourage patient/family to participate in care and decision-making at the level they choose  - Honor patient and family perspectives and choices  Outcome: Progressing     Problem: Patient/Family Goals  Goal: Patient/Family Long Term Goal  Description: Patient's Long Term Goal: Discharge home     Interventions:  - monitor vs, I&o, pain per protocol   - See additional Care Plan goals for specific interventions  Outcome: Progressing  Goal: Patient/Family Short Term Goal  Description: Patient's Short Term Goal: manage risk of bleeding    Interventions:   - monitor vs, assess pain, ambulate as tolerated  - See additional Care Plan goals for specific interventions  Outcome: Progressing     Problem: RISK FOR INFECTION - ADULT  Goal: Absence of fever/infection during anticipated neutropenic period  Description: INTERVENTIONS  - Monitor WBC  - Administer growth factors as ordered  - Implement neutropenic guidelines  Outcome: Progressing     Problem: SAFETY ADULT - FALL  Goal: Free from fall injury  Description: INTERVENTIONS:  - Assess pt frequently for physical needs  - Identify cognitive and physical deficits and behaviors that affect risk of falls.  - [place] fall precautions as indicated by assessment.  - Educate pt/family on patient safety including physical limitations  - Instruct pt to call for assistance with activity based on assessment  - Modify environment to reduce risk of injury  - Provide assistive devices as appropriate  - Consider OT/PT consult to assist with strengthening/mobility  -  Encourage toileting schedule  Outcome: Progressing     Problem: DISCHARGE PLANNING  Goal: Discharge to home or other facility with appropriate resources  Description: INTERVENTIONS:  - Identify barriers to discharge w/pt and caregiver  - Include patient/family/discharge partner in discharge planning  - Arrange for needed discharge resources and transportation as appropriate  - Identify discharge learning needs (meds, wound care, etc)  - Arrange for interpreters to assist at discharge as needed  - Consider post-discharge preferences of patient/family/discharge partner  - Complete POLST form as appropriate  - Assess patient's ability to be responsible for managing their own health  - Refer to Case Management Department for coordinating discharge planning if the patient needs post-hospital services based on physician/LIP order or complex needs related to functional status, cognitive ability or social support system  Outcome: Progressing     Problem: RESPIRATORY - ADULT  Goal: Achieves optimal ventilation and oxygenation  Description: INTERVENTIONS:  - Assess for changes in respiratory status  - Assess for changes in mentation and behavior  - Position to facilitate oxygenation and minimize respiratory effort  - Oxygen supplementation based on oxygen saturation or ABGs  - Provide Smoking Cessation handout, if applicable  - Encourage broncho-pulmonary hygiene including cough, deep breathe, Incentive Spirometry  - Assess the need for suctioning and perform as needed  - Assess and instruct to report SOB or any respiratory difficulty  - Respiratory Therapy support as indicated  - Manage/alleviate anxiety  - Monitor for signs/symptoms of CO2 retention  Outcome: Progressing     Problem: HEMATOLOGIC - ADULT  Goal: Maintains hematologic stability  Description: INTERVENTIONS  - Assess for signs and symptoms of bleeding or hemorrhage  - Monitor labs and vital signs for trends  - Administer supportive blood products/factors, fluids  and medications as ordered and appropriate  - Administer supportive blood products/factors as ordered and appropriate  Outcome: Progressing  Goal: Free from bleeding injury  Description: (Example usage: patient with low platelets)  INTERVENTIONS:  - Avoid intramuscular injections, enemas and rectal medication administration  - Ensure safe mobilization of patient  - Hold pressure on venipuncture sites to achieve adequate hemostasis  - Assess for signs and symptoms of internal bleeding  - Monitor lab trends  - Patient is to report abnormal signs of bleeding to staff  - Avoid use of toothpicks and dental floss  - Use electric shaver for shaving  - Use soft bristle tooth brush  - Limit straining and forceful nose blowing  Outcome: Progressing     Problem: GASTROINTESTINAL - ADULT  Goal: Maintains or returns to baseline bowel function  Description: INTERVENTIONS:  - Assess bowel function  - Maintain adequate hydration with IV or PO as ordered and tolerated  - Evaluate effectiveness of GI medications  - Encourage mobilization and activity  - Obtain nutritional consult as needed  - Establish a toileting routine/schedule  - Consider collaborating with pharmacy to review patient's medication profile  Outcome: Progressing     Patient vital signs stable.  Call light and belongings within reach.  PRN Norco given for pain.  IV antibiotics continued.  Bed locked and in lowest position, safety precautions in place.

## 2024-01-18 NOTE — PROGRESS NOTES
Hematology Oncology Progress Note    Patient Name: Margaret Silverio   YOB: 1945   Medical Record Number: [id number]   CSN: [id number]   Attending Physician: Briana Snyder MD     Subjective:  Not feeling well today. C/o lower abdominal pain today, no nausea, vomiting. No BM.   No bleeding issues reported.     Objective:  Vitals:  Vitals:    01/18/24 0535 01/18/24 0605 01/18/24 0911 01/18/24 1437   BP:  137/74 140/83 125/69   BP Location:  Left arm Left arm Left arm   Pulse:  60 60 61   Resp:  16 18 19   Temp:  97.6 °F (36.4 °C) 97.2 °F (36.2 °C) 97.8 °F (36.6 °C)   TempSrc:  Oral Oral Oral   SpO2:  97% 96% 98%   Weight: 66.2 kg (146 lb)      Height:           Current Medications:    Current Facility-Administered Medications:     furosemide (Lasix) 10 mg/mL injection 40 mg, 40 mg, Intravenous, Daily    metoprolol tartrate (Lopressor) tab 50 mg, 50 mg, Oral, Once PRN **OR** metoprolol tartrate (Lopressor) tab 100 mg, 100 mg, Oral, Once PRN    amitriptyline (Elavil) tab 25 mg, 25 mg, Oral, Nightly    carvedilol (Coreg) tab 25 mg, 25 mg, Oral, BID    sacubitril-valsartan (Entresto) 24-26 MG per tab 1 tablet, 1 tablet, Oral, BID    ondansetron (Zofran) 4 MG/2ML injection 4 mg, 4 mg, Intravenous, Q6H PRN    piperacillin-tazobactam (Zosyn) 3.375 g in dextrose 5% 100 mL IVPB-ADDV, 3.375 g, Intravenous, Q8H    pantoprazole (Protonix) 40 mg in sodium chloride 0.9% PF 10 mL IV push, 40 mg, Intravenous, Q12H    HYDROcodone-acetaminophen (Norco) 5-325 MG per tab 1 tablet, 1 tablet, Oral, Q6H PRN    Physical Examination:  General: Alert and oriented x 3, not in acute distress.  HEENT: EOMs intact. Oropharynx is clear.   Chest: Clear to auscultation.  Heart: Regular rate and rhythm.   Abdomen: Soft, non tender  Extremities: No edema. Hand RA deformities bilaterally.   Neurological: Grossly intact.     Labs:  Recent Labs   Lab 01/13/24  0536 01/13/24  1358 01/16/24  0519 01/17/24  0531 01/18/24  0613   RBC 2.32*    < > 3.05* 2.87* 2.68*   HGB 7.1*   < > 9.6* 8.6* 8.1*   HCT 22.1*   < > 29.3* 27.6* 25.7*   MCV 95.3   < > 96.1 96.2 95.9   MCH 30.6   < > 31.5 30.0 30.2   MCHC 32.1   < > 32.8 31.2 31.5   RDW 17.0*   < > 18.2* 18.1* 19.1*   NEPRELIM 2.36  --   --  1.90 1.33*   WBC 3.0*   < > 3.0* 2.8* 2.4*   PLT 38.0*   < > 88.0* 90.0* 83.0*    < > = values in this interval not displayed.     Recent Labs   Lab 01/12/24 2059 01/13/24  0536 01/14/24 0412 01/16/24  0519 01/17/24  0531 01/18/24  0613   GLU 47* 200*   < > 90 92 100*   BUN 27* 26*   < > 13 12 11   CREATSERUM 1.57* 1.32*   < > 1.20* 1.27* 1.31*   EGFRCR 34* 42*   < > 47* 44* 42*   CA 9.0 8.2*   < > 8.5* 9.2 9.0   ALB 3.6 3.0*   < > 3.3 3.4 3.3   * 139   < > 138 145 147*   K 5.0 3.4*   < > 3.9 3.0* 3.4*  3.4*    103   < > 102 108 111   CO2 15.0* 27.0   < > 30.0 30.0 27.0   ALKPHO 135 110  --   --   --   --    AST 38* 38*  --   --   --   --    ALT 17 15  --   --   --   --    BILT 2.2* 1.6*  --   --   --   --    TP 6.8 5.6*  --   --   --   --     < > = values in this interval not displayed.      Recent Labs   Lab 01/12/24 2059 01/13/24 0818 01/13/24  1821 01/14/24  0412 01/15/24  0653   INR 5.24*   < > 3.63* 2.60* 1.60*   PTT 44.1*  --   --   --  42.1*    < > = values in this interval not displayed.        Radiology:  CT ABDOMEN+PELVIS(CPT=74176)    Result Date: 1/18/2024  CONCLUSION:   1. No bowel obstruction, acute appendicitis, abnormal bowel wall thickening, or acute diverticulitis. 2. Persistent bilateral nephrograms, which may be secondary to acute tubular necrosis, hypotension, or bilateral renal artery stenosis. 3. New striated right nephrogram, which may be secondary to pyelonephritis, acute tubular necrosis, or less likely renal infarcts. 4. No hydronephrosis. 5. Circumferential bladder wall thickening, which is concerning for cystitis.  Recommend correlation with urinalysis. 6. Redemonstrated sequela of chronic pancreatitis. 7. Progressive small  to moderate volume of abdominopelvic ascites as well as progressive anasarca, which is suggestive of volume overload. 8. No significant change in the cardiomegaly, small to moderate loculated right pleural effusion, and trace left pleural effusion.  9. Lesser incidental findings described above.    Dictated by (CST): Anshul Joseph MD on 1/18/2024 at 1:21 PM     Finalized by (CST): Anshul Joseph MD on 1/18/2024 at 1:45 PM            Impression and Plan:    Acute on chronic anemia   - chronic anemia multifactorial; anemia of chronic disease, renal failure, drug induced (methotrexate) acutely worsening due to GI blood loss in the setting of anticoagulation  - prior BMBx was negative per patient- done in [place] few years ago. no records.   - labs not consistent with hemolysis; low haptoglobin is likely due to recent transfusion, no reticulocytosis, LDH and bili only slightly elevated which is non specific.  - folate, B12 and iron labs ok.   - Hgb mildly decreased since last transfusion on 1/13.   - transfuse as needed to keep Hgb > 8 given cardiac disease     Thrombocytopenia  - severe acute thrombocytopenia; admit plt 30's, normal count at baseline  - probably consumptive in the setting of acute illness/sepsis/bleeding vs drug induced vs immune thrombocytopenia   - peripheral smear with no platelet clumps or schistocytes. Labs not consistent with hemolysis of DIC.   - s/p 2 units prior to endoscopies with excellent response.   - platelet count remains stable since the transfusion. Continue to monitor   - transfuse as needed to maintain plt > 20 or >50 if actively bleeding    Melena  - presenting with melena x1 week. h/o small AVM in the duodenal bulb s/p hemoclip 4/2023  - ECG/CLN 1/17 showed singsl small transverse colon AVM s/p APC likely the source of bleeding and mild gastric erythema      Coagulopathy  - admit INR 5.1 likely secondary to DOAC and vit K deficiency from poor nutrition  - no history of liver  disease or alcohol use. fibrinogen normal- unlikely DIC.  - s/p 1 FFP and  vitamin K 2 mg IV on 1/13--> INR down to 1.6   - continue holding eliquis      Leukopenia  - mainly lymphopenia, likely drug induced vs autoimmune   - will monitor   - may need repeat BMBx if worsening cytopenias or remain of unclear etiology    Sepsis  Abdominal pain   - per primary     Rheumatoid arthritis   - on methotrexate > 20 yrs and humira added 2021      Will continue to follow.        Briana Snyder MD   [facility]

## 2024-01-18 NOTE — DIETARY NOTE
ADULT NUTRITION INITIAL ASSESSMENT    Pt is at moderate nutrition risk.  Pt does not meet malnutrition criteria.      RECOMMENDATIONS TO MD: See Nutrition Intervention Pt may benefit from temporary nutrition support if unable to advance diet/tolerate po intake given prolonged inadequate oral intake    ADMITTING DIAGNOSIS:  Melena [K92.1]  Lactic acidosis [E87.20]  Coagulopathy (HCC) [D68.9]  Anticoagulated [Z79.01]  SIRS (systemic inflammatory response syndrome) (HCC) [R65.10]  Hypothermia, initial encounter [T68.XXXA]  Dyspnea, unspecified type [R06.00]  PERTINENT PAST MEDICAL HISTORY:   Past Medical History:   Diagnosis Date    Anemia     Arrhythmia     Arthritis     Deep vein thrombosis (HCC)     Essential hypertension     High blood pressure     History of blood transfusion     Seizure disorder (HCC)     Seizures (HCC)      PATIENT STATUS: Initial 01/18/24: Pt admit for melena, lactic acidosis, coagulopathy, anticoagulated, SIRS, Hypothermia, and dyspnea. PMH sig for a-fib on Eliquis, HTN, seizure disorder, CHF, s/p pacemaker placement (September 2023) and others noted above. Pt assessed due to screening at risk for early length of stay (LOS) - 6 days with limited intake documentation. Diet downgraded to nothing by mouth (NPO) this morning for CT A/P and currently on clear liquid diet (CLD). Chart reviewed, pt admitted with c/o shortness of breath (SOB) with dark/black diarrhea. Diet reviewed, pt advance to low fiber diet on 1/14, made NPO for EGD/CLN at midnight on 1/16. Per notes, pt unable to complete prep so procedure postponed to 1/17. EGD/CLN completed 1/17 with EGD noting gastric erythema and CLN noting diverticulosis. Low fiber diet resumed s/p procedure until this morning when pt c/o left-sided abdominal pain and CT A/P ordered, monitor results. Discussion with RN, pt currently on CLD per GI. Pt visited, no family in room. Pt reports not much appetite but ate well yesterday for dinner. Pt reports  abdominal pain this morning about 30-35 minutes after eating breakfast his morning. Pt denies nausea/vomiting. Pt reports having not eaten much since admission. Prior to admission (PTA), pt reports decreased appetite for about 3-4 days otherwise has relatively good appetite prior to symptoms starting. Denies use of oral nutritional supplement (ONS) but has used Ensure in past. Unsure about weight loss, reports usual body weight (UBW) about 145#. Current weight 146#. EMR weight review, weight appears relatively stable over last year. Nutrition focused physical exam (NFPE) completed, no wasting noted. Discussed adding oral nutritional supplement (ONS), pt in agreement. +ONS per discussion.   If pt unable to tolerate po intake/advance diet may benefit from temporary nutrition support.     FOOD/NUTRITION RELATED HISTORY:  Appetite:  decreased appetite PTA for 3-4 days otherwise pt reports good intake  Intake: ~0-100% x4 meals documented since admit Limited documentation. Pt on and off diet r/t procedures (EGD/CLN originally 1/16 but postponed to 1/17. Pt on low fiber diet s/p procedure but downgraded to CLD today r/t abdominal pain (CT A/P obtained today)  Intake Meeting Needs: No, but oral nutrition supplements (ONS) to maximize  Percent Meals Eaten (last 6 days)       Date/Time Percent Meals Eaten (%)    01/13/24 1300 100 %    01/16/24 1103 0 %    01/16/24 1500 0 %     Percent Meals Eaten (%): NPO at 01/16/24 1500    01/17/24 1848 100 %    01/18/24 0912 5 %          Food Allergies: No Known Food Allergies (NKFA)  Cultural/Ethnic/Sabianist Preferences: Not Obtained    GASTROINTESTINAL: +BM 1/17/2024- large;watery (prep)  C. Diff pending    MEDICATIONS: reviewed no IVF noted  IV Lasix daily resumed today per MD   amitriptyline  25 mg Oral Nightly    carvedilol  25 mg Oral BID    sacubitril-valsartan  1 tablet Oral BID    piperacillin-tazobactam  3.375 g Intravenous Q8H    pantoprazole  40 mg Intravenous Q12H     LABS:  reviewed  Recent Labs     01/16/24  0519 01/17/24  0531 01/18/24  0613   GLU 90 92 100*   BUN 13 12 11   CREATSERUM 1.20* 1.27* 1.31*   CA 8.5* 9.2 9.0   MG 1.4* 1.8 1.6    145 147*   K 3.9 3.0* 3.4*  3.4*    108 111   CO2 30.0 30.0 27.0   PHOS 2.7  2.7 2.5 2.8  2.8   OSMOCALC 286 299* 303*     NUTRITION RELATED PHYSICAL FINDINGS:  - Nutrition Focused Physical Exam (NFPE): no wasting noted per visual exam  - Fluid Accumulation: +1 Bilateral Feet see RN documentation for details  - Skin Integrity: at risk and intact see RN documentation for details    ANTHROPOMETRICS:  HT: 165.1 cm (5' 5\")  WT: 66.2 kg (146 lb)   BMI: Body mass index is 24.3 kg/m².  BMI CLASSIFICATION: 19-24.9 kg/m2 - WNL  IBW: 125 lbs        117% IBW  Usual Body Wt: 145 lbs per pt      101% UBW    WEIGHT HISTORY:  Patient Weight(s) for the past 336 hrs:   Weight   01/18/24 0535 66.2 kg (146 lb)   01/17/24 1312 66.2 kg (146 lb)   01/17/24 0432 66.2 kg (146 lb)   01/16/24 0430 68.5 kg (151 lb)   01/15/24 0536 69.4 kg (153 lb)   01/13/24 0119 68.2 kg (150 lb 5.7 oz)   01/12/24 2034 63.5 kg (140 lb)     Wt Readings from Last 10 Encounters:   01/18/24 66.2 kg (146 lb)   12/26/23 65.8 kg (145 lb)   09/21/23 65.8 kg (145 lb)   08/21/23 64.3 kg (141 lb 12.8 oz)   07/17/23 64 kg (141 lb)   06/15/23 64 kg (141 lb)   06/06/23 64 kg (141 lb 1.6 oz)   05/12/23 63.5 kg (140 lb)   05/05/23 67.1 kg (147 lb 14.9 oz)   04/28/23 70.7 kg (155 lb 12.8 oz)     NUTRITION DIAGNOSIS/PROBLEM:   Inadequate oral intake related to Decreased ability to consume sufficient energy as evidenced by decreased appetite reported by pt and diet downgraded back to clear liquids    NUTRITION INTERVENTION:   NUTRITION PRESCRIPTION:   Estimated Nutrition needs: --dosing wt of 66.2 kg - wt taken on 1/18/2024  Calories: 0800-0784 calories/day (25-30 calories per kg Dosing wt)  Protein: 73-93 g protein/day (1.1-1.4 g protein/kg Dosing wt)    - Diet:       Procedures    Clear  liquid diet Is Patient on Accuchecks? No; Misc Restriction: Low Fiber/Soft      - RD Care Plan: Encouraged small frequent meals with emphasis on high calorie/high protein and Initiated ONS (oral nutritional supplements)  - Meals and snacks: Encouraged small frequent meals  - Medical Food Supplements-RD added Ensure Clear (240 calories/ 8 g protein each) Daily Apple or Mixed Berry and Ensure Compact (220 calories/ 9 g protein each) Daily Chocolate. Rational/use of oral supplements discussed.  - Vitamin and mineral supplements: none  - Feeding assistance: meal set up  - Nutrition education: Discussed importance of adequate energy and protein intake     - Coordination of nutrition care: collaboration with other providers  - Discharge and transfer of nutrition care to new setting or provider: monitor plans    MONITOR AND EVALUATE/NUTRITION GOALS:  - Food and Nutrient Intake:      Monitor: adequacy of PO intake, tolerance of PO intake, adequacy of supplement intake, and for PO diet advancement  - Food and Nutrient Administration:      Monitor: need for temporary enteral nutrition and need for temporary nutrition support  - Anthropometric Measurement:    Monitor weight  - Nutrition Goals:      maintain wt within 5%, PO and supplement greater than 75% of needs, good supplement intake, diet beyond clears in 48hrs, labs within acceptable limits, minimize lean body mass loss, prevent skin breakdown, and improved GI status    DIETITIAN FOLLOW UP: RD to follow and monitor nutrition status    Fidelina Ambrose MS, HANS, RDN, LDN  [id number]

## 2024-01-18 NOTE — PROGRESS NOTES
[facility]     Gastroenterology Progress Note    Margaret Silverio Patient Status:  Inpatient    3/14/1945 MRN [id number]   Location [facility] 3W/SW Attending Regan Cruz MD   Hosp Day # 5 PCP Johnathon Rodriguez DO       Subjective:   Notes left sided abdominal pain that started this morning  Doesn't feel well today   No bowel movements    Objective:   Blood pressure 140/83, pulse 60, temperature 97.2 °F (36.2 °C), temperature source Oral, resp. rate 18, height 5' 5\" (1.651 m), weight 146 lb (66.2 kg), SpO2 96%. Body mass index is 24.3 kg/m².    Gen: awake, alert patient, NAD  HEENT: EOMI, the sclera appears anicteric, oropharynx clear, mucus membranes appear moist  CV: RRR  Lung: no conversational dyspnea   Abdomen: soft, moderate left sided tenderness  Skin: dry, warm, no jaundice  Ext: no LE edema is evident  Neuro: Alert and interactive  Psych: calm, cooperative    Results:     Lab Results   Component Value Date    WBC 2.4 (L) 2024    HGB 8.1 (L) 2024    HCT 25.7 (L) 2024    PLT 90.0 (L) 2024    CREATSERUM 1.31 (H) 2024    BUN 11 2024     (H) 2024    K 3.4 (L) 2024    K 3.4 (L) 2024     2024    CO2 27.0 2024     (H) 2024    CA 9.0 2024    ALB 3.3 2024    ALKPHO 110 2024    BILT 1.6 (H) 2024    TP 5.6 (L) 2024    AST 38 (H) 2024    ALT 15 2024    PTT 42.1 (H) 01/15/2024    INR 1.60 (H) 01/15/2024    T4F 1.2 2024    TSH 19.180 (H) 2024    LIP 32 2024    MG 1.6 2024    PHOS 2.8 2024    PHOS 2.8 2024    B12 >2,000 (H) 2024       CT CARDIAC OVER READ    Result Date: 2024  CONCLUSION:   Cardiac over-read performed. Please see the separately dictated cardiologist report for further details.  Small to moderate loculated right pleural effusion stable since the prior study with mild probable partial  right lower lobe adjacent passive atelectasis.  Trace left pleural effusion also unchanged since the prior study.    Dictated by (CST): Azar Mendez MD on 1/17/2024 at 12:49 PM     Finalized by (CST): Azar Mendez MD on 1/17/2024 at 12:53 PM             Assessment and Plan:   Margaret Silverio is a 78 year old woman with hx of afib on anticoagulation, HTN, seizure disorder who presented to [facility] with SOB and was subsequently found to have acute on chronic anemia. The patient also reported having one to two dark/bloody stools prior to admission. She has a hx of GI bleed in 4/2023 with EGD done showing a potential AVM in the duodenal bulb s/p clip placement.     S/p EGD and colonoscopy 1/17 showing single small transverse colon AVM s/p argon plasma coagulation, and mild gastric erythema.    # anemia  Source of recurrent anemia likely secondary to GI AVM disease, synchronous small bowel AVMs not excluded. Hgb 8.1, last transfusion was 1/13 when hgb was 7.1.   - continue to monitor hgb  - anticoagulation remains on hold    # abdominal pain  - appears new post colonoscopy with cautery  - obtain CT a/p today, npo until resulted     D/w RN    Zoraida Rios MD

## 2024-01-18 NOTE — PLAN OF CARE
Patient alert and oriented. Zofran and Norco given PRN for abdominal pain and nausea. CT scan of abdomen in the morning. Tolerating clear liquid diet. IV abx continued. Resting in bed with fall precautions in place and call light in reach.      Problem: Patient Centered Care  Goal: Patient preferences are identified and integrated in the patient's plan of care  Description: Interventions:  - What would you like us to know as we care for you? From home with spouse  - Provide timely, complete, and accurate information to patient/family  - Incorporate patient and family knowledge, values, beliefs, and cultural backgrounds into the planning and delivery of care  - Encourage patient/family to participate in care and decision-making at the level they choose  - Honor patient and family perspectives and choices  Outcome: Progressing     Problem: Patient/Family Goals  Goal: Patient/Family Long Term Goal  Description: Patient's Long Term Goal: Discharge home     Interventions:  - monitor vs, I&o, pain per protocol   - See additional Care Plan goals for specific interventions  Outcome: Progressing  Goal: Patient/Family Short Term Goal  Description: Patient's Short Term Goal: manage risk of bleeding    Interventions:   - monitor vs, assess pain, ambulate as tolerated  - See additional Care Plan goals for specific interventions  Outcome: Progressing     Problem: RISK FOR INFECTION - ADULT  Goal: Absence of fever/infection during anticipated neutropenic period  Description: INTERVENTIONS  - Monitor WBC  - Administer growth factors as ordered  - Implement neutropenic guidelines  Outcome: Progressing     Problem: SAFETY ADULT - FALL  Goal: Free from fall injury  Description: INTERVENTIONS:  - Assess pt frequently for physical needs  - Identify cognitive and physical deficits and behaviors that affect risk of falls.  - [place] fall precautions as indicated by assessment.  - Educate pt/family on patient safety including physical  limitations  - Instruct pt to call for assistance with activity based on assessment  - Modify environment to reduce risk of injury  - Provide assistive devices as appropriate  - Consider OT/PT consult to assist with strengthening/mobility  - Encourage toileting schedule  Outcome: Progressing     Problem: DISCHARGE PLANNING  Goal: Discharge to home or other facility with appropriate resources  Description: INTERVENTIONS:  - Identify barriers to discharge w/pt and caregiver  - Include patient/family/discharge partner in discharge planning  - Arrange for needed discharge resources and transportation as appropriate  - Identify discharge learning needs (meds, wound care, etc)  - Arrange for interpreters to assist at discharge as needed  - Consider post-discharge preferences of patient/family/discharge partner  - Complete POLST form as appropriate  - Assess patient's ability to be responsible for managing their own health  - Refer to Case Management Department for coordinating discharge planning if the patient needs post-hospital services based on physician/LIP order or complex needs related to functional status, cognitive ability or social support system  Outcome: Progressing     Problem: RESPIRATORY - ADULT  Goal: Achieves optimal ventilation and oxygenation  Description: INTERVENTIONS:  - Assess for changes in respiratory status  - Assess for changes in mentation and behavior  - Position to facilitate oxygenation and minimize respiratory effort  - Oxygen supplementation based on oxygen saturation or ABGs  - Provide Smoking Cessation handout, if applicable  - Encourage broncho-pulmonary hygiene including cough, deep breathe, Incentive Spirometry  - Assess the need for suctioning and perform as needed  - Assess and instruct to report SOB or any respiratory difficulty  - Respiratory Therapy support as indicated  - Manage/alleviate anxiety  - Monitor for signs/symptoms of CO2 retention  Outcome: Progressing     Problem:  HEMATOLOGIC - ADULT  Goal: Maintains hematologic stability  Description: INTERVENTIONS  - Assess for signs and symptoms of bleeding or hemorrhage  - Monitor labs and vital signs for trends  - Administer supportive blood products/factors, fluids and medications as ordered and appropriate  - Administer supportive blood products/factors as ordered and appropriate  Outcome: Progressing  Goal: Free from bleeding injury  Description: (Example usage: patient with low platelets)  INTERVENTIONS:  - Avoid intramuscular injections, enemas and rectal medication administration  - Ensure safe mobilization of patient  - Hold pressure on venipuncture sites to achieve adequate hemostasis  - Assess for signs and symptoms of internal bleeding  - Monitor lab trends  - Patient is to report abnormal signs of bleeding to staff  - Avoid use of toothpicks and dental floss  - Use electric shaver for shaving  - Use soft bristle tooth brush  - Limit straining and forceful nose blowing  Outcome: Progressing     Problem: GASTROINTESTINAL - ADULT  Goal: Maintains or returns to baseline bowel function  Description: INTERVENTIONS:  - Assess bowel function  - Maintain adequate hydration with IV or PO as ordered and tolerated  - Evaluate effectiveness of GI medications  - Encourage mobilization and activity  - Obtain nutritional consult as needed  - Establish a toileting routine/schedule  - Consider collaborating with pharmacy to review patient's medication profile  Outcome: Progressing

## 2024-01-18 NOTE — PROGRESS NOTES
[facility]     Progress Note    Margaret Silverio Patient Status:  Inpatient    3/14/1945 MRN [id number]   Location [facility] 2W/SW Attending Regan Cruz MD   Hosp Day # 5 PCP Johnathon Rodriguez,        Subjective:   Patient seen and examined.  Denies significant dyspnea.      Objective:   Blood pressure 140/83, pulse 60, temperature 97.2 °F (36.2 °C), temperature source Oral, resp. rate 18, height 5' 5\" (1.651 m), weight 146 lb (66.2 kg), SpO2 96%.  Intake/Output:   Last 3 shifts: I/O last 3 completed shifts:  In: 1890 [P.O.:870; I.V.:620; IV PIGGYBACK:400]  Out: 1600 [Urine:1600]   This shift: I/O this shift:  In: 500 [P.O.:240; I.V.:10; IV PIGGYBACK:250]  Out: -      Vent Settings:      Hemodynamic parameters (last 24 hours):      Scheduled Meds:         Continuous Infusions:     Physical Exam  Constitutional: no acute distress  Eyes: PERRL  ENT: nares pateint  Neck: supple, no JVD  Cardio: RRR, S1 S2  Respiratory: clear to auscultation bilaterally, no wheezing, rales, rhonchi, crackles  GI: abdomen soft, non tender, active bowel sounds, no organomegaly  Extremities: no clubbing, cyanosis, edema  Neurologic: no gross motor deficits  Skin: warm, dry      Results:     Lab Results   Component Value Date    WBC 2.4 2024    HGB 8.1 2024    HCT 25.7 2024    PLT 83.0 2024    CREATSERUM 1.31 2024    BUN 11 2024     2024    K 3.4 2024    K 3.4 2024     2024    CO2 27.0 2024     2024    CA 9.0 2024    ALB 3.3 2024    MG 1.6 2024    PHOS 2.8 2024    PHOS 2.8 2024       CT CARDIAC OVER READ    Result Date: 2024  CONCLUSION:   Cardiac over-read performed. Please see the separately dictated cardiologist report for further details.  Small to moderate loculated right pleural effusion stable since the prior study with mild probable partial right lower lobe adjacent  passive atelectasis.  Trace left pleural effusion also unchanged since the prior study.    Dictated by (CST): Azar Mendez MD on 1/17/2024 at 12:49 PM     Finalized by (CST): Azar Mendez MD on 1/17/2024 at 12:53 PM                 Assessment   1.  Acute hypoxemic respiratory failure  2.  Possible GI bleed  3.  Pancytopenia  4.  Lactic acidosis  5.  Acute kidney injury  6.  Hypokalemia  7.  Atrial fibrillation  8.  CHF  9.  Coagulopathy  10.  Nonischemic cardiomyopathy  11.  Rheumatoid arthritis     Plan   -Patient presents with evidence of fatigue, dyspnea and concern for melanotic stools over the last several days plan for endoscopic evaluation today  -Oxygenation requirements improved during hospital course.    -CT chest on presentation with no pulmonary embolism seen.  Small loculated right and small left pleural effusion seen.  No significant consolidation seen  -Empiric IV antibiotic therapy at this time.  -Transfuse for hemoglobin below 7.   -Recommendations per GI  -Closely monitor renal function and output  -Hold anticoagulation at this time  -DVT prophylaxis: Eli Parsons,   Pulmonary Critical Care Medicine  [facility]

## 2024-01-18 NOTE — PROGRESS NOTES
[facility]    Progress Note    Margaret Silverio Patient Status:  Inpatient    3/14/1945 MRN [id number]   Location [facility] 2W/SW Attending Regan Cruz MD   Hosp Day # 5 PCP Johnathon Rodriguez DO     Chief Complaint:   Chief Complaint   Patient presents with    Difficulty Breathing   Diarrhea, black stools.     Subjective:   Margaret Silverio reporting abd pain the morning.  Focused in the LLQ at the time of my exam.  No n/v.      10 ROS completed and otherwise negative.    Objective:   Objective:    Blood pressure 140/83, pulse 60, temperature 97.2 °F (36.2 °C), temperature source Oral, resp. rate 18, height 5' 5\" (1.651 m), weight 146 lb (66.2 kg), SpO2 96%.    Physical Exam:      Gen: No acute distress  Pulm: Lungs clear, normal respiratory effort  CV: Heart with regular rate and rhythm  Abd: Abdomen soft, +TTP, no rebound  Neuro: No acute focal deficits  MSK: Full range of motion in extremities  Skin: Warm and dry  Psych: Normal affect  Ext: no cyanosis      Results:   Results:    Labs:  Recent Labs   Lab 24  2059 24  2100 24  0818 24  1358 24  1821 24  0412 01/15/24  0653 24  0133 24  0519 24  0531 24  0613   WBC  --    < >  --   --  3.9* 3.1* 2.7*  --  3.0* 2.8* 2.4*   HGB  --    < >  --    < > 9.3* 8.9* 8.8* 7.9* 9.6* 8.6* 8.1*   MCV  --    < >  --   --  90.1 92.9 94.2  --  96.1 96.2 95.9   PLT  --    < >  --   --  26.0* 35.0* 32.0*  --  88.0* 90.0* 83.0*   INR 5.24*  --  5.10*  --  3.63* 2.60* 1.60*  --   --   --   --     < > = values in this interval not displayed.       Recent Labs   Lab 24  0536 24  0412 24  0519 24  0531 24  0613   GLU 47* 200*   < > 90 92 100*   BUN 27* 26*   < > 13 12 11   CREATSERUM 1.57* 1.32*   < > 1.20* 1.27* 1.31*   CA 9.0 8.2*   < > 8.5* 9.2 9.0   ALB 3.6 3.0*   < > 3.3 3.4 3.3   * 139   < > 138 145 147*   K 5.0 3.4*   < > 3.9 3.0*  3.4*  3.4*    103   < > 102 108 111   CO2 15.0* 27.0   < > 30.0 30.0 27.0   ALKPHO 135 110  --   --   --   --    AST 38* 38*  --   --   --   --    ALT 17 15  --   --   --   --    BILT 2.2* 1.6*  --   --   --   --    TP 6.8 5.6*  --   --   --   --     < > = values in this interval not displayed.       Estimated Creatinine Clearance: 32.4 mL/min (A) (based on SCr of 1.31 mg/dL (H)).    Recent Labs   Lab 01/13/24  1821 01/14/24  0412 01/15/24  0653   PTP 38.4* 29.4* 20.0*   INR 3.63* 2.60* 1.60*              Culture:  Hospital Encounter on 01/12/24   1. Blood Culture     Status: None    Collection Time: 01/12/24  9:33 PM    Specimen: Blood,peripheral   Result Value Ref Range    Blood Culture Result No Growth 5 Days N/A   2. Urine Culture, Routine     Status: None    Collection Time: 01/12/24  8:53 PM    Specimen: Urine, clean catch   Result Value Ref Range    Urine Culture No Growth at 18-24 hrs. N/A       Cardiac  No results for input(s): \"TROP\", \"PBNP\" in the last 168 hours.      Imaging: Imaging data reviewed in [facility].  CT CARDIAC OVER READ    Result Date: 1/17/2024  CONCLUSION:   Cardiac over-read performed. Please see the separately dictated cardiologist report for further details.  Small to moderate loculated right pleural effusion stable since the prior study with mild probable partial right lower lobe adjacent passive atelectasis.  Trace left pleural effusion also unchanged since the prior study.    Dictated by (CST): Azar Mendez MD on 1/17/2024 at 12:49 PM     Finalized by (CST): Azar Mendez MD on 1/17/2024 at 12:53 PM           Medications:    potassium chloride  40 mEq Intravenous Once    [Held by provider] furosemide  20 mg Intravenous BID (Diuretic)    amitriptyline  25 mg Oral Nightly    carvedilol  25 mg Oral BID    sacubitril-valsartan  1 tablet Oral BID    piperacillin-tazobactam  3.375 g Intravenous Q8H    pantoprazole  40 mg Intravenous Q12H         Assessment and Plan:   Margaret  Jonny Silverio is a 78-year-old female with a history of atrial fibrillation on Eliquis, hypertension, chronic systolic heart failure and status post pacemaker placement earlier in September, who p/w shortness of breath fatigue and melanotic stools for a week.       Acute upper GI bleed   Acute blood loss anemia  Chronic anemia   - Patient with 1 week of diarrhea and melanotic stools PTA  - Hb 7.1 lowest. Trending up now   - GI on consult.   - hold Eliquis.   - transfuse for Hb < 7.0 or PLT < 10K or < 50K with active bleeding   - s/p FFP 1 unit and 1 unit PRBC.   - h/o EGD 04/23' with AVM s/p hemoclip   - EGD/CLN done 1/17 and colon AVM s/p APC  - given abd pain today - made npo and stat ct abd to further evaluate    Acute hypoxemic respiratory failure   - secondary to CHF and RLL PNA   - off NIPPV. Off o2 NC   - Pulmonary on consult.   - Lasix 20mg IV BID   - IV zosyn empirically   - blood cx 2/2 NGTD. Genexpert neg,     Acute pancytopenia   - Likely related to severe sepsis on admit.   - retic ct 13, B12 ok, folate pending. Hemolysis labs negative.   - Given PRBC and will give PLT today x 2 units   - HONC on consult     Severe sepsis   - secondary to PNA   - Lactic acid 12 on admit --> 4.8 will rpt   - blood cx x 2 neg   - CXR possible RLL PNA   - IV zosyn   - CT chest small loculated R and small L pleural effusion no consolidation. CT abd pelvis chronic pancreatitis   - C.diff and GI panel pending   - Ucx negative     Acute on Chronic combined CHF   Severe Tricuspid valve regurgitation  Mitral valve bioprosthesis  NICM s/p ICD 9/2023  - ECHO from 10/2023 with LVEF 37% grade 4 TR.   - Cards consult.   - Lasix 20mg IV BID   - coreg 25mg BID   - Entresto   - daily weights , I/O     Paroxysmal A.fib   - hold home Eiquis   - rate controlled.   - consider eventual outpt watchman with recurrent GI bleed    Acute kidney injury on CKD III  - Improved.   - Possible ATN from hypoperfusion/anemia vs sepsis  - baseline creat 1.3  per EMR   - Monitor UOP   - UA with hyaline casts indicative of decreased renal perfusion     LUE edema  - elevate. Hot packs   - US negative for DVT.   - IV infiltrated overnight 1/13     Other medical problems  H/o RA  H/o small bowel AVM's    MDM: High        Supplementary Documentation:     Quality:  DVT Prophylaxis: SCD  CODE status: Full  Dispo: per clinical course    Estimated date of discharge: TBD  Discharge is dependent on: clinical stability  At this point Ms. Silverio is expected to be discharge to: home

## 2024-01-19 LAB
ALBUMIN SERPL-MCNC: 3.2 G/DL (ref 3.2–4.8)
ANION GAP SERPL CALC-SCNC: 6 MMOL/L (ref 0–18)
BASOPHILS # BLD: 0 X10(3) UL (ref 0–0.2)
BASOPHILS NFR BLD: 0 %
BUN BLD-MCNC: 13 MG/DL (ref 9–23)
BUN/CREAT SERPL: 8.4 (ref 10–20)
CALCIUM BLD-MCNC: 9.1 MG/DL (ref 8.7–10.4)
CHLORIDE SERPL-SCNC: 107 MMOL/L (ref 98–112)
CO2 SERPL-SCNC: 28 MMOL/L (ref 21–32)
CREAT BLD-MCNC: 1.54 MG/DL
DEPRECATED RDW RBC AUTO: 62.7 FL (ref 35.1–46.3)
EGFRCR SERPLBLD CKD-EPI 2021: 35 ML/MIN/1.73M2 (ref 60–?)
EOSINOPHIL # BLD: 0.23 X10(3) UL (ref 0–0.7)
EOSINOPHIL NFR BLD: 11 %
ERYTHROCYTE [DISTWIDTH] IN BLOOD BY AUTOMATED COUNT: 19.5 % (ref 11–15)
GLUCOSE BLD-MCNC: 83 MG/DL (ref 70–99)
HCT VFR BLD AUTO: 26.1 %
HELMET CELLS BLD QL SMEAR: PRESENT
HGB BLD-MCNC: 7.9 G/DL
LYMPHOCYTES NFR BLD: 0.61 X10(3) UL (ref 1–4)
LYMPHOCYTES NFR BLD: 27 %
MAGNESIUM SERPL-MCNC: 1.5 MG/DL (ref 1.6–2.6)
MCH RBC QN AUTO: 29.7 PG (ref 26–34)
MCHC RBC AUTO-ENTMCNC: 30.3 G/DL (ref 31–37)
MCV RBC AUTO: 98.1 FL
MONOCYTES # BLD: 0.04 X10(3) UL (ref 0.1–1)
MONOCYTES NFR BLD: 2 %
NEUTROPHILS # BLD AUTO: 1.15 X10 (3) UL (ref 1.5–7.7)
NEUTROPHILS NFR BLD: 58 %
NEUTS HYPERSEG # BLD: 1.22 X10(3) UL (ref 1.5–7.7)
NRBC BLD MANUAL-RTO: 28 %
OSMOLALITY SERPL CALC.SUM OF ELEC: 291 MOSM/KG (ref 275–295)
PHOSPHATE SERPL-MCNC: 2.7 MG/DL (ref 2.4–5.1)
PLATELET # BLD AUTO: 101 10(3)UL (ref 150–450)
POTASSIUM SERPL-SCNC: 3.6 MMOL/L (ref 3.5–5.1)
POTASSIUM SERPL-SCNC: 3.6 MMOL/L (ref 3.5–5.1)
RBC # BLD AUTO: 2.66 X10(6)UL
SODIUM SERPL-SCNC: 141 MMOL/L (ref 136–145)
TOTAL CELLS COUNTED BLD: 100
VARIANT LYMPHS NFR BLD MANUAL: 2 %
WBC # BLD AUTO: 2.1 X10(3) UL (ref 4–11)

## 2024-01-19 PROCEDURE — 99233 SBSQ HOSP IP/OBS HIGH 50: CPT | Performed by: HOSPITALIST

## 2024-01-19 PROCEDURE — 99233 SBSQ HOSP IP/OBS HIGH 50: CPT | Performed by: INTERNAL MEDICINE

## 2024-01-19 PROCEDURE — 99232 SBSQ HOSP IP/OBS MODERATE 35: CPT | Performed by: INTERNAL MEDICINE

## 2024-01-19 PROCEDURE — 99223 1ST HOSP IP/OBS HIGH 75: CPT | Performed by: INTERNAL MEDICINE

## 2024-01-19 RX ORDER — MAGNESIUM OXIDE 400 MG/1
800 TABLET ORAL ONCE
Status: COMPLETED | OUTPATIENT
Start: 2024-01-19 | End: 2024-01-19

## 2024-01-19 NOTE — PROGRESS NOTES
[facility]    Progress Note    Margaret Silverio Patient Status:  Inpatient    3/14/1945 MRN [id number]   Location [facility] 2W/SW Attending Regan Cruz MD   Hosp Day # 6 PCP Johnathon Rodriguez DO     Chief Complaint:   Chief Complaint   Patient presents with    Difficulty Breathing   Diarrhea, black stools.     Subjective:   Margaret Silverio reporting neck pain today.  No further abd pain.  No n/v.  Tolerating diet thus far.     10 ROS completed and otherwise negative.    Objective:   Objective:    Blood pressure 120/73, pulse 61, temperature 97.7 °F (36.5 °C), temperature source Oral, resp. rate 18, height 5' 5\" (1.651 m), weight 151 lb 8 oz (68.7 kg), SpO2 97%.    Physical Exam:      Gen: No acute distress  Pulm: Lungs clear, normal respiratory effort  CV: Heart with regular rate and rhythm  Abd: Abdomen soft, non tender  Neuro: No acute focal deficits  MSK: Full range of motion in extremities  Skin: Warm and dry  Psych: Normal affect  Ext: no cyanosis      Results:   Results:    Labs:  Recent Labs   Lab 24  2059 24  2100 24  0818 24  1358 24  1821 24  0412 01/15/24  0653 24  0133 24  0519 24  0531 24  0613 24  0538   WBC  --    < >  --   --  3.9* 3.1* 2.7*  --  3.0* 2.8* 2.4* 2.1*   HGB  --    < >  --    < > 9.3* 8.9* 8.8* 7.9* 9.6* 8.6* 8.1* 7.9*   MCV  --    < >  --   --  90.1 92.9 94.2  --  96.1 96.2 95.9 98.1   PLT  --    < >  --   --  26.0* 35.0* 32.0*  --  88.0* 90.0* 83.0* 101.0*   INR 5.24*  --  5.10*  --  3.63* 2.60* 1.60*  --   --   --   --   --     < > = values in this interval not displayed.       Recent Labs   Lab 24  0536 24  0412 24  0531 24  0613 24  0538   GLU 47* 200*   < > 92 100* 83   BUN 27* 26*   < > 12 11 13   CREATSERUM 1.57* 1.32*   < > 1.27* 1.31* 1.54*   CA 9.0 8.2*   < > 9.2 9.0 9.1   ALB 3.6 3.0*   < > 3.4 3.3 3.2   * 139   < >  145 147* 141   K 5.0 3.4*   < > 3.0* 3.4*  3.4* 3.6  3.6    103   < > 108 111 107   CO2 15.0* 27.0   < > 30.0 27.0 28.0   ALKPHO 135 110  --   --   --   --    AST 38* 38*  --   --   --   --    ALT 17 15  --   --   --   --    BILT 2.2* 1.6*  --   --   --   --    TP 6.8 5.6*  --   --   --   --     < > = values in this interval not displayed.       Estimated Creatinine Clearance: 27.5 mL/min (A) (based on SCr of 1.54 mg/dL (H)).    Recent Labs   Lab 01/13/24  1821 01/14/24  0412 01/15/24  0653   PTP 38.4* 29.4* 20.0*   INR 3.63* 2.60* 1.60*              Culture:  Hospital Encounter on 01/12/24   1. Blood Culture     Status: None    Collection Time: 01/12/24  9:33 PM    Specimen: Blood,peripheral   Result Value Ref Range    Blood Culture Result No Growth 5 Days N/A   2. Urine Culture, Routine     Status: None    Collection Time: 01/12/24  8:53 PM    Specimen: Urine, clean catch   Result Value Ref Range    Urine Culture No Growth at 18-24 hrs. N/A       Cardiac  No results for input(s): \"TROP\", \"PBNP\" in the last 168 hours.      Imaging: Imaging data reviewed in [facility].  CT ABDOMEN+PELVIS(CPT=74176)    Result Date: 1/18/2024  CONCLUSION:   1. No bowel obstruction, acute appendicitis, abnormal bowel wall thickening, or acute diverticulitis. 2. Persistent bilateral nephrograms, which may be secondary to acute tubular necrosis, hypotension, or bilateral renal artery stenosis. 3. New striated right nephrogram, which may be secondary to pyelonephritis, acute tubular necrosis, or less likely renal infarcts. 4. No hydronephrosis. 5. Circumferential bladder wall thickening, which is concerning for cystitis.  Recommend correlation with urinalysis. 6. Redemonstrated sequela of chronic pancreatitis. 7. Progressive small to moderate volume of abdominopelvic ascites as well as progressive anasarca, which is suggestive of volume overload. 8. No significant change in the cardiomegaly, small to moderate loculated right pleural  effusion, and trace left pleural effusion.  9. Lesser incidental findings described above.    Dictated by (CST): Anshul Joseph MD on 1/18/2024 at 1:21 PM     Finalized by (CST): Anshul Joseph MD on 1/18/2024 at 1:45 PM          CT CARDIAC OVER READ    Result Date: 1/17/2024  CONCLUSION:   Cardiac over-read performed. Please see the separately dictated cardiologist report for further details.  Small to moderate loculated right pleural effusion stable since the prior study with mild probable partial right lower lobe adjacent passive atelectasis.  Trace left pleural effusion also unchanged since the prior study.    Dictated by (CST): Azar Mendez MD on 1/17/2024 at 12:49 PM     Finalized by (CST): Azar Mendez MD on 1/17/2024 at 12:53 PM           Medications:    furosemide  40 mg Intravenous Daily    amitriptyline  25 mg Oral Nightly    carvedilol  25 mg Oral BID    sacubitril-valsartan  1 tablet Oral BID    piperacillin-tazobactam  3.375 g Intravenous Q8H    pantoprazole  40 mg Intravenous Q12H         Assessment and Plan:   Margaret Silverio is a 78-year-old female with a history of atrial fibrillation on Eliquis, hypertension, chronic systolic heart failure and status post pacemaker placement earlier in September, who p/w shortness of breath fatigue and melanotic stools for a week.       Acute upper GI bleed   Acute blood loss anemia  Chronic anemia   - Patient with 1 week of diarrhea and melanotic stools PTA  - Hb 7.1 lowest. Trending up now   - GI on consult.   - hold Eliquis.   - transfuse for Hb < 7.0 or PLT < 10K or < 50K with active bleeding   - s/p FFP 1 unit and 1 unit PRBC.   - h/o EGD 04/23' with AVM s/p hemoclip   - EGD/CLN done 1/17 and colon AVM s/p APC  - CLD - advance per GI    Acute hypoxemic respiratory failure   - secondary to CHF and RLL PNA   - off NIPPV. Off o2 NC   - Pulmonary on consult.   - Lasix 20mg IV BID - changed to daily  - IV zosyn empirically   - blood cx 2/2 NGTD.  Genexpert neg,     Acute pancytopenia   - Likely related to severe sepsis on admit.   - retic ct 13, B12 ok, folate pending. Hemolysis labs negative.   - HONC on consult     Severe sepsis   - secondary to PNA   - Lactic acid 12 on admit   - blood cx x 2 neg   - CXR possible RLL PNA   - IV zosyn   - CT chest small loculated R and small L pleural effusion no consolidation. CT abd pelvis chronic pancreatitis   - Ucx negative     Acute on Chronic combined CHF   Severe Tricuspid valve regurgitation  Mitral valve bioprosthesis  NICM s/p ICD 9/2023  - ECHO from 10/2023 with LVEF 37% grade 4 TR.   - Cards consult.   - Lasix 20mg IV BID  --> changed to IV 40mg daily  - coreg 25mg BID   - Entresto   - daily weights , I/O     Paroxysmal A.fib   - hold home Eiquis   - rate controlled.   - consider eventual outpt watchman with recurrent GI bleed    Acute kidney injury on CKD III  - Improved but worsening again - will get renal consult  - Possible ATN from hypoperfusion/anemia vs sepsis  - baseline creat 1.3 per EMR   - Monitor UOP   - UA with hyaline casts indicative of decreased renal perfusion     LUE edema  - elevate. Hot packs   - US negative for DVT.   - IV infiltrated overnight 1/13     Other medical problems  H/o RA  H/o small bowel AVM's    MDM: High        Supplementary Documentation:     Quality:  DVT Prophylaxis: SCD  CODE status: Full  Dispo: per clinical course    Estimated date of discharge: TBD  Discharge is dependent on: clinical stability  At this point Ms. Silverio is expected to be discharge to: home

## 2024-01-19 NOTE — OCCUPATIONAL THERAPY NOTE
OT order received, chart reviewed. Patient declining to participate with therapy at this time d/t eating dinner. Requested therapy return later.    1638: Patient still working on eating dinner. OT will f/u tomorrow as appropriate.       Hailey Marte OTR/L  [facility]  #[id number]

## 2024-01-19 NOTE — PROGRESS NOTES
[facility]     Progress Note    Margaret Silverio Patient Status:  Inpatient    3/14/1945 MRN [id number]   Location [facility] 2W/SW Attending Regan Cruz MD   Hosp Day # 6 PCP Johnathon Rodriguez,        Subjective:   Patient seen and examined.  No significant distress.    Objective:   Blood pressure 120/73, pulse 61, temperature 97.7 °F (36.5 °C), temperature source Oral, resp. rate 18, height 5' 5\" (1.651 m), weight 151 lb 8 oz (68.7 kg), SpO2 97%.  Intake/Output:   Last 3 shifts: I/O last 3 completed shifts:  In: 2150 [P.O.:1390; I.V.:10; IV PIGGYBACK:750]  Out: 1050 [Urine:1050]   This shift: I/O this shift:  In: -   Out: 1 [Urine:1]     Vent Settings:      Hemodynamic parameters (last 24 hours):      Scheduled Meds:         Continuous Infusions:     Physical Exam  Constitutional: no acute distress  Eyes: PERRL  ENT: nares pateint  Neck: supple, no JVD  Cardio: RRR, S1 S2  Respiratory: clear to auscultation bilaterally, no wheezing, rales, rhonchi, crackles  GI: abdomen soft, non tender, active bowel sounds, no organomegaly  Extremities: no clubbing, cyanosis, edema  Neurologic: no gross motor deficits  Skin: warm, dry      Results:     Lab Results   Component Value Date    WBC 2.1 2024    HGB 7.9 2024    HCT 26.1 2024    .0 2024    CREATSERUM 1.54 2024    BUN 13 2024     2024    K 3.6 2024    K 3.6 2024     2024    CO2 28.0 2024    GLU 83 2024    CA 9.1 2024    ALB 3.2 2024    MG 1.5 2024    PHOS 2.7 2024       CT ABDOMEN+PELVIS(CPT=74176)    Result Date: 2024  CONCLUSION:   1. No bowel obstruction, acute appendicitis, abnormal bowel wall thickening, or acute diverticulitis. 2. Persistent bilateral nephrograms, which may be secondary to acute tubular necrosis, hypotension, or bilateral renal artery stenosis. 3. New striated right nephrogram, which may be  secondary to pyelonephritis, acute tubular necrosis, or less likely renal infarcts. 4. No hydronephrosis. 5. Circumferential bladder wall thickening, which is concerning for cystitis.  Recommend correlation with urinalysis. 6. Redemonstrated sequela of chronic pancreatitis. 7. Progressive small to moderate volume of abdominopelvic ascites as well as progressive anasarca, which is suggestive of volume overload. 8. No significant change in the cardiomegaly, small to moderate loculated right pleural effusion, and trace left pleural effusion.  9. Lesser incidental findings described above.    Dictated by (CST): Anshul Joseph MD on 1/18/2024 at 1:21 PM     Finalized by (CST): Anshul Joseph MD on 1/18/2024 at 1:45 PM          CT CARDIAC OVER READ    Result Date: 1/17/2024  CONCLUSION:   Cardiac over-read performed. Please see the separately dictated cardiologist report for further details.  Small to moderate loculated right pleural effusion stable since the prior study with mild probable partial right lower lobe adjacent passive atelectasis.  Trace left pleural effusion also unchanged since the prior study.    Dictated by (CST): Azar Mendez MD on 1/17/2024 at 12:49 PM     Finalized by (CST): Azar Mendez MD on 1/17/2024 at 12:53 PM                 Assessment   1.  Acute hypoxemic respiratory failure  2.  Possible GI bleed  3.  Pancytopenia  4.  Lactic acidosis  5.  Acute kidney injury  6.  Hypokalemia  7.  Atrial fibrillation  8.  CHF  9.  Coagulopathy  10.  Nonischemic cardiomyopathy  11.  Rheumatoid arthritis     Plan   -Patient presents with evidence of fatigue, dyspnea and concern for melanotic stools over the last several days plan for endoscopic evaluation today  -Oxygenation requirements improved during hospital course.  Weaned off oxygen  -CT chest on presentation with no pulmonary embolism seen.  Small loculated right and small left pleural effusion seen.  No significant consolidation  seen  -Empiric IV antibiotic therapy at this time.  -Transfuse for hemoglobin below 7.   -Recommendations per GI  -Closely monitor renal function and output  -Hold anticoagulation at this time  -DVT prophylaxis: SCDs  -No active pulmonary issues.  Will sign off.    Augustin Parsons, DO  Pulmonary Critical Care Medicine  [facility]

## 2024-01-19 NOTE — PLAN OF CARE
Pt alert and oriented x4. X1 assist with walker. AV paced. IV Lasix. IV Zosyn. Pt diet advanced to low fiber/soft. Magnesium replaced. Bladder scan done, MD aware. PRN Norco given for neck pain. Pt updated on plan of care. Safety precautions in place. Call light within reach.    Problem: Patient Centered Care  Goal: Patient preferences are identified and integrated in the patient's plan of care  Description: Interventions:  - What would you like us to know as we care for you? From home with spouse  - Provide timely, complete, and accurate information to patient/family  - Incorporate patient and family knowledge, values, beliefs, and cultural backgrounds into the planning and delivery of care  - Encourage patient/family to participate in care and decision-making at the level they choose  - Honor patient and family perspectives and choices  Outcome: Progressing     Problem: Patient/Family Goals  Goal: Patient/Family Long Term Goal  Description: Patient's Long Term Goal: Discharge home     Interventions:  - monitor vs, I&o, pain per protocol   - See additional Care Plan goals for specific interventions  Outcome: Progressing  Goal: Patient/Family Short Term Goal  Description: Patient's Short Term Goal: manage risk of bleeding    Interventions:   - monitor vs, assess pain, ambulate as tolerated  - See additional Care Plan goals for specific interventions  Outcome: Progressing     Problem: RISK FOR INFECTION - ADULT  Goal: Absence of fever/infection during anticipated neutropenic period  Description: INTERVENTIONS  - Monitor WBC  - Administer growth factors as ordered  - Implement neutropenic guidelines  Outcome: Progressing     Problem: SAFETY ADULT - FALL  Goal: Free from fall injury  Description: INTERVENTIONS:  - Assess pt frequently for physical needs  - Identify cognitive and physical deficits and behaviors that affect risk of falls.  - [place] fall precautions as indicated by assessment.  - Educate pt/family on  patient safety including physical limitations  - Instruct pt to call for assistance with activity based on assessment  - Modify environment to reduce risk of injury  - Provide assistive devices as appropriate  - Consider OT/PT consult to assist with strengthening/mobility  - Encourage toileting schedule  Outcome: Progressing     Problem: DISCHARGE PLANNING  Goal: Discharge to home or other facility with appropriate resources  Description: INTERVENTIONS:  - Identify barriers to discharge w/pt and caregiver  - Include patient/family/discharge partner in discharge planning  - Arrange for needed discharge resources and transportation as appropriate  - Identify discharge learning needs (meds, wound care, etc)  - Arrange for interpreters to assist at discharge as needed  - Consider post-discharge preferences of patient/family/discharge partner  - Complete POLST form as appropriate  - Assess patient's ability to be responsible for managing their own health  - Refer to Case Management Department for coordinating discharge planning if the patient needs post-hospital services based on physician/LIP order or complex needs related to functional status, cognitive ability or social support system  Outcome: Progressing     Problem: RESPIRATORY - ADULT  Goal: Achieves optimal ventilation and oxygenation  Description: INTERVENTIONS:  - Assess for changes in respiratory status  - Assess for changes in mentation and behavior  - Position to facilitate oxygenation and minimize respiratory effort  - Oxygen supplementation based on oxygen saturation or ABGs  - Provide Smoking Cessation handout, if applicable  - Encourage broncho-pulmonary hygiene including cough, deep breathe, Incentive Spirometry  - Assess the need for suctioning and perform as needed  - Assess and instruct to report SOB or any respiratory difficulty  - Respiratory Therapy support as indicated  - Manage/alleviate anxiety  - Monitor for signs/symptoms of CO2  retention  Outcome: Progressing     Problem: HEMATOLOGIC - ADULT  Goal: Maintains hematologic stability  Description: INTERVENTIONS  - Assess for signs and symptoms of bleeding or hemorrhage  - Monitor labs and vital signs for trends  - Administer supportive blood products/factors, fluids and medications as ordered and appropriate  - Administer supportive blood products/factors as ordered and appropriate  Outcome: Progressing  Goal: Free from bleeding injury  Description: (Example usage: patient with low platelets)  INTERVENTIONS:  - Avoid intramuscular injections, enemas and rectal medication administration  - Ensure safe mobilization of patient  - Hold pressure on venipuncture sites to achieve adequate hemostasis  - Assess for signs and symptoms of internal bleeding  - Monitor lab trends  - Patient is to report abnormal signs of bleeding to staff  - Avoid use of toothpicks and dental floss  - Use electric shaver for shaving  - Use soft bristle tooth brush  - Limit straining and forceful nose blowing  Outcome: Progressing     Problem: GASTROINTESTINAL - ADULT  Goal: Maintains or returns to baseline bowel function  Description: INTERVENTIONS:  - Assess bowel function  - Maintain adequate hydration with IV or PO as ordered and tolerated  - Evaluate effectiveness of GI medications  - Encourage mobilization and activity  - Obtain nutritional consult as needed  - Establish a toileting routine/schedule  - Consider collaborating with pharmacy to review patient's medication profile  Outcome: Progressing

## 2024-01-19 NOTE — PLAN OF CARE
Patient report of back pain- PRN pain medication administed. Denied nausea. Tolerating clear liquid diet. No BM this shift. Rochelle care provided. Up ambulate x1 assist with walker. On IV Zosyn, midline right upper arm. Safety precaution maintained. Plan for monitor renal function, outpatient watchmen device with Dr. Sargent, pending medical clearance.     Problem: Patient Centered Care  Goal: Patient preferences are identified and integrated in the patient's plan of care  Description: Interventions:  - What would you like us to know as we care for you? From home with spouse  - Provide timely, complete, and accurate information to patient/family  - Incorporate patient and family knowledge, values, beliefs, and cultural backgrounds into the planning and delivery of care  - Encourage patient/family to participate in care and decision-making at the level they choose  - Honor patient and family perspectives and choices  Outcome: Progressing     Problem: Patient/Family Goals  Goal: Patient/Family Long Term Goal  Description: Patient's Long Term Goal: Discharge home     Interventions:  - monitor vs, I&o, pain per protocol   - See additional Care Plan goals for specific interventions  Outcome: Progressing  Goal: Patient/Family Short Term Goal  Description: Patient's Short Term Goal: manage risk of bleeding    Interventions:   - monitor vs, assess pain, ambulate as tolerated  - See additional Care Plan goals for specific interventions  Outcome: Progressing     Problem: RISK FOR INFECTION - ADULT  Goal: Absence of fever/infection during anticipated neutropenic period  Description: INTERVENTIONS  - Monitor WBC  - Administer growth factors as ordered  - Implement neutropenic guidelines  Outcome: Progressing     Problem: SAFETY ADULT - FALL  Goal: Free from fall injury  Description: INTERVENTIONS:  - Assess pt frequently for physical needs  - Identify cognitive and physical deficits and behaviors that affect risk of falls.  -  [place] fall precautions as indicated by assessment.  - Educate pt/family on patient safety including physical limitations  - Instruct pt to call for assistance with activity based on assessment  - Modify environment to reduce risk of injury  - Provide assistive devices as appropriate  - Consider OT/PT consult to assist with strengthening/mobility  - Encourage toileting schedule  Outcome: Progressing     Problem: DISCHARGE PLANNING  Goal: Discharge to home or other facility with appropriate resources  Description: INTERVENTIONS:  - Identify barriers to discharge w/pt and caregiver  - Include patient/family/discharge partner in discharge planning  - Arrange for needed discharge resources and transportation as appropriate  - Identify discharge learning needs (meds, wound care, etc)  - Arrange for interpreters to assist at discharge as needed  - Consider post-discharge preferences of patient/family/discharge partner  - Complete POLST form as appropriate  - Assess patient's ability to be responsible for managing their own health  - Refer to Case Management Department for coordinating discharge planning if the patient needs post-hospital services based on physician/LIP order or complex needs related to functional status, cognitive ability or social support system  Outcome: Progressing     Problem: RESPIRATORY - ADULT  Goal: Achieves optimal ventilation and oxygenation  Description: INTERVENTIONS:  - Assess for changes in respiratory status  - Assess for changes in mentation and behavior  - Position to facilitate oxygenation and minimize respiratory effort  - Oxygen supplementation based on oxygen saturation or ABGs  - Provide Smoking Cessation handout, if applicable  - Encourage broncho-pulmonary hygiene including cough, deep breathe, Incentive Spirometry  - Assess the need for suctioning and perform as needed  - Assess and instruct to report SOB or any respiratory difficulty  - Respiratory Therapy support as  indicated  - Manage/alleviate anxiety  - Monitor for signs/symptoms of CO2 retention  Outcome: Progressing     Problem: HEMATOLOGIC - ADULT  Goal: Maintains hematologic stability  Description: INTERVENTIONS  - Assess for signs and symptoms of bleeding or hemorrhage  - Monitor labs and vital signs for trends  - Administer supportive blood products/factors, fluids and medications as ordered and appropriate  - Administer supportive blood products/factors as ordered and appropriate  Outcome: Progressing  Goal: Free from bleeding injury  Description: (Example usage: patient with low platelets)  INTERVENTIONS:  - Avoid intramuscular injections, enemas and rectal medication administration  - Ensure safe mobilization of patient  - Hold pressure on venipuncture sites to achieve adequate hemostasis  - Assess for signs and symptoms of internal bleeding  - Monitor lab trends  - Patient is to report abnormal signs of bleeding to staff  - Avoid use of toothpicks and dental floss  - Use electric shaver for shaving  - Use soft bristle tooth brush  - Limit straining and forceful nose blowing  Outcome: Progressing     Problem: GASTROINTESTINAL - ADULT  Goal: Maintains or returns to baseline bowel function  Description: INTERVENTIONS:  - Assess bowel function  - Maintain adequate hydration with IV or PO as ordered and tolerated  - Evaluate effectiveness of GI medications  - Encourage mobilization and activity  - Obtain nutritional consult as needed  - Establish a toileting routine/schedule  - Consider collaborating with pharmacy to review patient's medication profile  Outcome: Progressing

## 2024-01-19 NOTE — PROGRESS NOTES
Hematology Oncology Progress Note    Patient Name: Margaret Silverio   YOB: 1945   Medical Record Number: [id number]   CSN: [id number]   Attending Physician: Briana Snyder MD     Subjective:  Feels better today. Abd pain resolved. No new complaints.     Objective:  Vitals:  Vitals:    01/18/24 2041 01/19/24 0434 01/19/24 0441 01/19/24 0849   BP: 127/69 114/70  120/73   BP Location: Left arm Left arm  Left arm   Pulse:  60  61   Resp: 16 16 18   Temp: 97.3 °F (36.3 °C) 98.1 °F (36.7 °C)  97.7 °F (36.5 °C)   TempSrc: Oral Oral  Oral   SpO2: 93% 99%  97%   Weight:   68.7 kg (151 lb 8 oz)    Height:           Current Medications:    Current Facility-Administered Medications:     furosemide (Lasix) 10 mg/mL injection 40 mg, 40 mg, Intravenous, Daily    metoprolol tartrate (Lopressor) tab 50 mg, 50 mg, Oral, Once PRN **OR** metoprolol tartrate (Lopressor) tab 100 mg, 100 mg, Oral, Once PRN    amitriptyline (Elavil) tab 25 mg, 25 mg, Oral, Nightly    carvedilol (Coreg) tab 25 mg, 25 mg, Oral, BID    sacubitril-valsartan (Entresto) 24-26 MG per tab 1 tablet, 1 tablet, Oral, BID    ondansetron (Zofran) 4 MG/2ML injection 4 mg, 4 mg, Intravenous, Q6H PRN    piperacillin-tazobactam (Zosyn) 3.375 g in dextrose 5% 100 mL IVPB-ADDV, 3.375 g, Intravenous, Q8H    pantoprazole (Protonix) 40 mg in sodium chloride 0.9% PF 10 mL IV push, 40 mg, Intravenous, Q12H    HYDROcodone-acetaminophen (Norco) 5-325 MG per tab 1 tablet, 1 tablet, Oral, Q6H PRN    Physical Examination:  General: Alert and oriented x 3, not in acute distress.  HEENT: EOMs intact. Oropharynx is clear.   Chest: Clear to auscultation.  Heart: Regular rate and rhythm.   Abdomen: Soft, non tender  Extremities: No edema. Hand RA deformities bilaterally.   Neurological: Grossly intact.     Labs:  Recent Labs   Lab 01/17/24  0531 01/18/24  0613 01/19/24  0538   RBC 2.87* 2.68* 2.66*   HGB 8.6* 8.1* 7.9*   HCT 27.6* 25.7* 26.1*   MCV 96.2 95.9 98.1   MCH  30.0 30.2 29.7   MCHC 31.2 31.5 30.3*   RDW 18.1* 19.1* 19.5*   NEPRELIM 1.90 1.33* 1.15*   WBC 2.8* 2.4* 2.1*   PLT 90.0* 83.0* 101.0*     Recent Labs   Lab 01/12/24 2059 01/13/24  0536 01/14/24 0412 01/17/24  0531 01/18/24  0613 01/19/24  0538   GLU 47* 200*   < > 92 100* 83   BUN 27* 26*   < > 12 11 13   CREATSERUM 1.57* 1.32*   < > 1.27* 1.31* 1.54*   EGFRCR 34* 42*   < > 44* 42* 35*   CA 9.0 8.2*   < > 9.2 9.0 9.1   ALB 3.6 3.0*   < > 3.4 3.3 3.2   * 139   < > 145 147* 141   K 5.0 3.4*   < > 3.0* 3.4*  3.4* 3.6  3.6    103   < > 108 111 107   CO2 15.0* 27.0   < > 30.0 27.0 28.0   ALKPHO 135 110  --   --   --   --    AST 38* 38*  --   --   --   --    ALT 17 15  --   --   --   --    BILT 2.2* 1.6*  --   --   --   --    TP 6.8 5.6*  --   --   --   --     < > = values in this interval not displayed.      Recent Labs   Lab 01/12/24 2059 01/13/24 0818 01/13/24  1821 01/14/24  0412 01/15/24  0653   INR 5.24*   < > 3.63* 2.60* 1.60*   PTT 44.1*  --   --   --  42.1*    < > = values in this interval not displayed.      Lab Results   Component Value Date/Time    .0 (L) 01/19/2024 05:38 AM    PLT 83.0 (L) 01/18/2024 06:13 AM    PLT 90.0 (L) 01/17/2024 05:31 AM    PLT 88.0 (L) 01/16/2024 05:19 AM    PLT 32.0 (L) 01/15/2024 06:53 AM    PLT 35.0 (L) 01/14/2024 04:12 AM    PLT 26.0 (L) 01/13/2024 06:21 PM     Radiology:  CT ABDOMEN+PELVIS(CPT=74176)    Result Date: 1/18/2024  CONCLUSION:   1. No bowel obstruction, acute appendicitis, abnormal bowel wall thickening, or acute diverticulitis. 2. Persistent bilateral nephrograms, which may be secondary to acute tubular necrosis, hypotension, or bilateral renal artery stenosis. 3. New striated right nephrogram, which may be secondary to pyelonephritis, acute tubular necrosis, or less likely renal infarcts. 4. No hydronephrosis. 5. Circumferential bladder wall thickening, which is concerning for cystitis.  Recommend correlation with urinalysis. 6.  Redemonstrated sequela of chronic pancreatitis. 7. Progressive small to moderate volume of abdominopelvic ascites as well as progressive anasarca, which is suggestive of volume overload. 8. No significant change in the cardiomegaly, small to moderate loculated right pleural effusion, and trace left pleural effusion.  9. Lesser incidental findings described above.    Dictated by (CST): Anshul Joseph MD on 1/18/2024 at 1:21 PM     Finalized by (CST): Anshul Joseph MD on 1/18/2024 at 1:45 PM            Impression and Plan:    Acute on chronic anemia   - chronic anemia multifactorial; anemia of chronic disease, renal failure, drug induced (methotrexate) acutely worsening due to GI blood loss in the setting of anticoagulation  - prior BMBx was negative per patient- done in [place] few years ago. no records.   - labs not consistent with hemolysis, folate, B12 and iron labs ok.   - Hgb slowly trending down since last transfusion 1/13. Monitor closely and transfuse if hgb remains <8 due to cardiac disease     Thrombocytopenia  - severe acute thrombocytopenia, suyapa 26 with normal count at baseline  - probably consumptive in the setting of acute illness/sepsis/bleeding vs drug induced  - peripheral smear with no platelet clumps or schistocytes. Labs not consistent with hemolysis of DIC.   - s/p 2 units prior to endoscopies with excellent response.   - platelet count recovering slowly, now >100    Melena  - presenting with melena x1 week. 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 fibrinogen normal- unlikely DIC.  - s/p 1 FFP and  vitamin K 2 mg IV on 1/13--> INR down to 1.6   - eliquis remains on hold, resume if safe from GI standpoint, and  benefits of AC outweigh the risks of bleeding. Defer this to cardiology     Leukopenia  - mainly lymphopenia, likely drug induced vs autoimmune   - may need repeat BMBx if worsening cytopenias or remain of unclear etiology       Rheumatoid arthritis   - on methotrexate > 20 yrs and humira added 2021      Will continue to follow peripherally, please page with questions or acute issues.   Dr. Severino will be covering this weekend.       Briana Snyder MD   [facility]

## 2024-01-19 NOTE — PROGRESS NOTES
Progress Note  Margaret Silverio Patient Status:  Inpatient    3/14/1945 MRN [id number]   Location [facility] 3W/SW Attending Katiana Ochoa MD   Hosp Day # 6 PCP Johnathon Rodriguez DO     Subjective:  Denies cardiac complaints  Cr continues to rise    Objective:  /73 (BP Location: Left arm)   Pulse 61   Temp 97.7 °F (36.5 °C) (Oral)   Resp 18   Ht 5' 5\" (1.651 m)   Wt 151 lb 8 oz (68.7 kg)   SpO2 97%   BMI 25.21 kg/m²     Telemetry: AV paced    Intake/Output:    Intake/Output Summary (Last 24 hours) at 2024 1015  Last data filed at 2024 0900  Gross per 24 hour   Intake 1300 ml   Output 451 ml   Net 849 ml     Last 3 Weights   24 0441 151 lb 8 oz (68.7 kg)   24 0535 146 lb (66.2 kg)   24 1312 146 lb (66.2 kg)   24 0432 146 lb (66.2 kg)   24 0430 151 lb (68.5 kg)   01/15/24 0536 153 lb (69.4 kg)   24 0119 150 lb 5.7 oz (68.2 kg)   24 2034 140 lb (63.5 kg)   23 1412 145 lb (65.8 kg)   23 0818 145 lb (65.8 kg)     Labs:  Recent Labs   Lab 24  0531 24  0613 24  0538   GLU 92 100* 83   BUN 12 11 13   CREATSERUM 1.27* 1.31* 1.54*   EGFRCR 44* 42* 35*   CA 9.2 9.0 9.1    147* 141   K 3.0* 3.4*  3.4* 3.6  3.6    111 107   CO2 30.0 27.0 28.0     Recent Labs   Lab 24  0531 24  0613 24  0538   RBC 2.87* 2.68* 2.66*   HGB 8.6* 8.1* 7.9*   HCT 27.6* 25.7* 26.1*   MCV 96.2 95.9 98.1   MCH 30.0 30.2 29.7   MCHC 31.2 31.5 30.3*   RDW 18.1* 19.1* 19.5*   NEPRELIM 1.90 1.33* 1.15*   WBC 2.8* 2.4* 2.1*   PLT 90.0* 83.0* 101.0*     Recent Labs   Lab 24   TROPHS 32     Lab Results   Component Value Date/Time    HDL 51 2023 05:01 AM    LDL 20 2023 05:01 AM    TRIG 99 2023 05:01 AM     No results found for: \"DDIMER\"  Lab Results   Component Value Date    TSH 19.180 (H) 2024     Review of Systems:   Constitutional: No fevers, chills, fatigue or night  sweats.  ENT: No mouth pain, neck pain, running nose, headaches or swollen glands.  Skin: No rashes, pruritus or skin changes,  Respiratory: Denies cough, wheezing or shortness of breath.  CV: Denies chest pain, palpitations, orthopnea, PND or dizziness.  Musculoskeletal: No joint pain, stiffness or swelling.  GI: No nausea, vomiting or diarrhea. No blood in stools.  Neurologic: No seizures, tremors, weakness or numbness.     Physical Exam:  General: Alert, cooperative, no distress, appears stated age.  Neck: Supple, symmetrical, trachea midline, no adenopathy, thyroid: no enlargment/tenderness/nodules, no carotid bruit and no JVD.  Lungs: fine crackles bilaterally.  Chest wall: No tenderness or deformity.  Heart: Regular rate and rhythm, S1, S2 normal, no murmur, click, rub or gallop.  Abdomen: Soft, non-tender. Bowel sounds normal. No masses,  No organomegaly.  Extremities: Extremities normal, atraumatic, no cyanosis, 1+ BLE edema.  Pulses: 2+ and symmetric all extremities.  Neurologic: Grossly intact.    Diagnostics:  CT ABDOMEN+PELVIS(CPT=74176)    Result Date: 1/18/2024  CONCLUSION:   1. No bowel obstruction, acute appendicitis, abnormal bowel wall thickening, or acute diverticulitis. 2. Persistent bilateral nephrograms, which may be secondary to acute tubular necrosis, hypotension, or bilateral renal artery stenosis. 3. New striated right nephrogram, which may be secondary to pyelonephritis, acute tubular necrosis, or less likely renal infarcts. 4. No hydronephrosis. 5. Circumferential bladder wall thickening, which is concerning for cystitis.  Recommend correlation with urinalysis. 6. Redemonstrated sequela of chronic pancreatitis. 7. Progressive small to moderate volume of abdominopelvic ascites as well as progressive anasarca, which is suggestive of volume overload. 8. No significant change in the cardiomegaly, small to moderate loculated right pleural effusion, and trace left pleural effusion.  9. Lesser  incidental findings described above.    Dictated by (CST): Anshul Joseph MD on 1/18/2024 at 1:21 PM     Finalized by (CST): Anshul Joseph MD on 1/18/2024 at 1:45 PM           Medications:   furosemide  40 mg Intravenous Daily    amitriptyline  25 mg Oral Nightly    carvedilol  25 mg Oral BID    sacubitril-valsartan  1 tablet Oral BID    piperacillin-tazobactam  3.375 g Intravenous Q8H    pantoprazole  40 mg Intravenous Q12H     Assessment:    Recurrent GIB  Presented with melena, hgb drop  -EGD/colonoscopy 1/17 with small transverse colon AVM, s/p transfusion PRBC, FFP  -Hgb 7.9, stable ~8  -hx multiple GIB  -holding OAC  -CCTA for watchman eval did not clearly visualize the BRANDYN  -trying to obtain records from [facility] in [place] to determine if closure device was placed during mitral valve surgery    Paroxysmal atrial fibrillation   AV paced on tele ([employer] dual chamber ICD)  -previously discussed watchman as outpatient, but did not want to pursue further  -now with recurrent GIB  -eliquis on hold   -BB    HFrEF  NICM, LVEF 35%, s/p [place] DC-ICD 9/2023 for persistent low EF <35% despite 3 months GDMT  -coreg, low dose entresto, lasix  -I/Os net pos 6.6L, UOP not accurately documented  -weight up from admission     Severe sepsis  2/2 CHF, pneumonia  -CT chest with small loculated R and small L pleural effusions  -lactic 12 on admit, WBC low, afebrile  -blood cx negative  -diuresing with IV lasix as above  -IV abx per primary    SSS  S/p DC-ICD 9/2023 as above  -AV paced on tele    Mitral valve bioprosthesis  Performed in [place], trying to obtain records  -mean gradient 5mmHg    HTN  Normotensive  -entresto, coreg    Severe tricuspide valve regurgitation  -IV lasix    Hypokalemia  -electrolyte protocol     Hypomagnesemia  -replete per protocol    Pancytopenia  Heme onc following  -WBC 2.1, plt 101    Abdominal pain   Resolving  -clear liquid diet per GI    Rheumatoid  arthritis    Plan:  -Continue coreg, lasix BID, entresto   -Monitor strict I/Os, daily weight, BMP  -Continue to hold eliquis until cleared to resume per GI with how Hgb  -Trying to get records from previous hospital   -Outpatient watchman   -Further recs per GI    Plan of care discussed with patient, RN.      Delisa Miranda Sotero, APRN  1/19/2024  10:15 AM  [phone] [place]  [phone] [place]      PM rounds addendum: Agree with workup above, can start dispo planning    Patient seen and examined independently.  Agree with exam.  Labs reviewed.  Changes to assessment and plan as above.    Twin Boles MD  Interventional Cardiology  [facility]

## 2024-01-19 NOTE — PAYOR COMM NOTE
--------------  CONTINUED STAY REVIEW    Payor: [employer]  Subscriber #:  [id number]  Authorization Number: [id number]    Admit date: 1/13/24  Admit time: 12:44 AM    Admitting Physician: Regan Cruz MD  Attending Physician:  Katiana Ochoa MD  Primary Care Physician: Johnathon Rodriguez, DO    REVIEW DOCUMENTATION:   1-18-24     Notes left sided abdominal pain that started this morning  Doesn't feel well today   No bowel movements     Objective:   Blood pressure 140/83, pulse 60, temperature 97.2 °F (36.2 °C), temperature source Oral, resp. rate 18, height 5' 5\" (1.651 m), weight 146 lb (66.2 kg), SpO2 96%. Body mass index is 24.3 kg/m².     Gen: awake, alert patient, NAD  HEENT: EOMI, the sclera appears anicteric, oropharynx clear, mucus membranes appear moist  CV: RRR  Lung: no conversational dyspnea   Abdomen: soft, moderate left sided tenderness  Skin: dry, warm, no jaundice  Ext: no LE edema is evident  Neuro: Alert and interactive  Psych: calm, cooperative     Results:            Lab Results   Component Value Date     WBC 2.4 (L) 01/18/2024     HGB 8.1 (L) 01/18/2024     HCT 25.7 (L) 01/18/2024     PLT 90.0 (L) 01/17/2024     CREATSERUM 1.31 (H) 01/18/2024     BUN 11 01/18/2024      (H) 01/18/2024     K 3.4 (L) 01/18/2024     K 3.4 (L) 01/18/2024      01/18/2024     CO2 27.0 01/18/2024      (H) 01/18/2024     CA 9.0 01/18/2024     Assessment and Plan:   Margaret Silverio is a 78 year old woman with hx of afib on anticoagulation, HTN, seizure disorder who presented to [facility] with SOB and was subsequently found to have acute on chronic anemia. The patient also reported having one to two dark/bloody stools prior to admission. She has a hx of GI bleed in 4/2023 with EGD done showing a potential AVM in the duodenal bulb s/p clip placement.      S/p EGD and colonoscopy 1/17 showing single small transverse colon AVM s/p argon plasma coagulation, and mild  gastric erythema.     # anemia  Source of recurrent anemia likely secondary to GI AVM disease, synchronous small bowel AVMs not excluded. Hgb 8.1, last transfusion was 1/13 when hgb was 7.1.   - continue to monitor hgb  - anticoagulation remains on hold     # abdominal pain  - appears new post colonoscopy with cautery  - obtain CT a/p today, npo until resulted       CT SCAN OF THE ABD    CONCLUSION:      1. No bowel obstruction, acute appendicitis, abnormal bowel wall thickening, or acute diverticulitis.   2. Persistent bilateral nephrograms, which may be secondary to acute tubular necrosis, hypotension, or bilateral renal artery stenosis.   3. New striated right nephrogram, which may be secondary to pyelonephritis, acute tubular necrosis, or less likely renal infarcts.   4. No hydronephrosis.   5. Circumferential bladder wall thickening, which is concerning for cystitis.  Recommend correlation with urinalysis.   6. Redemonstrated sequela of chronic pancreatitis.   7. Progressive small to moderate volume of abdominopelvic ascites as well as progressive anasarca, which is suggestive of volume overload.   8. No significant change in the cardiomegaly, small to moderate loculated right pleural effusion, and trace left pleural effusion.             MEDICATIONS ADMINISTERED IN LAST 1 DAY:  amitriptyline (Elavil) tab 25 mg       Date Action Dose Route User    1/18/2024 2057 Given 25 mg Oral Nano Santillan RN          carvedilol (Coreg) tab 25 mg       Date Action Dose Route User    1/18/2024 2047 Given by Other 25 mg Oral Nano Santillan RN    1/18/2024 0912 Given 25 mg Oral Angy Durham, JEFFERY          furosemide (Lasix) 10 mg/mL injection 40 mg       Date Action Dose Route User    1/18/2024 1454 Given 40 mg Intravenous Beverly Valadez, JEFFERY          HYDROcodone-acetaminophen (Norco) 5-325 MG per tab 1 tablet       Date Action Dose Route User    1/18/2024 2100 Given 1 tablet Oral Nano Santillan RN    1/18/2024 4553  Given 1 tablet Oral Beverly Valadez RN          ondansetron (Zofran) 4 MG/2ML injection 4 mg       Date Action Dose Route User    1/18/2024 1311 Given 4 mg Intravenous Beverly Valadez RN          pantoprazole (Protonix) 40 mg in sodium chloride 0.9% PF 10 mL IV push       Date Action Dose Route User    1/18/2024 2048 Given by Other 40 mg Intravenous Nano Santillan RN    1/18/2024 0912 Given 40 mg Intravenous Angy Durham RN          piperacillin-tazobactam (Zosyn) 3.375 g in dextrose 5% 100 mL IVPB-ADDV       Date Action Dose Route User    1/19/2024 0435 New Bag 3.375 g Intravenous Nano Santillan RN    1/18/2024 2048 New Bag 3.375 g Intravenous Nano Santillan RN    1/18/2024 1311 New Bag 3.375 g Intravenous Beverly Valadez RN          potassium chloride 40 mEq in 250mL sodium chloride 0.9% IVPB premix       Date Action Dose Route User    1/18/2024 0912 New Bag 40 mEq Intravenous Angy Durham RN          sacubitril-valsartan (Entresto) 24-26 MG per tab 1 tablet       Date Action Dose Route User    1/18/2024 2047 Given by Other 1 tablet Oral Nano Santillan RN    1/18/2024 0912 Given 1 tablet Oral Angy Durham RN            Vitals (last day)       Date/Time Temp Pulse Resp BP SpO2 Weight O2 Device O2 Flow Rate (L/min) [facility]    01/19/24 0441 -- -- -- -- -- 151 lb 8 oz -- --     01/19/24 0434 98.1 °F (36.7 °C) 60 16 114/70 99 % -- None (Room air) --     01/18/24 2041 97.3 °F (36.3 °C) -- 16 127/69 93 % -- None (Room air) -- SS    01/18/24 1437 97.8 °F (36.6 °C) 61 19 125/69 98 % -- None (Room air) --     01/18/24 0911 97.2 °F (36.2 °C) 60 18 140/83 96 % -- None (Room air) -- JR    01/18/24 0605 97.6 °F (36.4 °C) 60 16 137/74 97 % -- None (Room air) -- CB    01/18/24 0535 -- -- -- -- -- 146 lb -- -- EL

## 2024-01-19 NOTE — PROGRESS NOTES
[facility]     Gastroenterology Progress Note    Margaret Silverio Patient Status:  Inpatient    3/14/1945 MRN [id number]   Location [facility] 3W/SW Attending Regan Cruz MD   Hosp Day # 6 PCP Johnathon Rodriguez DO       Subjective:   Abdominal pain resolved  No nausea or emesis  No bowel movements  No fever  No complaints today     Objective:   Blood pressure 120/73, pulse 61, temperature 97.7 °F (36.5 °C), temperature source Oral, resp. rate 18, height 5' 5\" (1.651 m), weight 151 lb 8 oz (68.7 kg), SpO2 97%. Body mass index is 25.21 kg/m².    Gen: awake, alert patient, NAD  HEENT: EOMI, the sclera appears anicteric, oropharynx clear, mucus membranes appear moist  CV: RRR  Lung: no conversational dyspnea   Abdomen: soft, non-tender, non-distended   Skin: dry, warm, no jaundice  Ext: no LE edema is evident  Neuro: Alert and interactive  Psych: calm, cooperative    Results:     Lab Results   Component Value Date    WBC 2.1 (L) 2024    HGB 7.9 (L) 2024    HCT 26.1 (L) 2024    .0 (L) 2024    CREATSERUM 1.54 (H) 2024    BUN 13 2024     2024    K 3.6 2024    K 3.6 2024     2024    CO2 28.0 2024    GLU 83 2024    CA 9.1 2024    ALB 3.2 2024    ALKPHO 110 2024    BILT 1.6 (H) 2024    TP 5.6 (L) 2024    AST 38 (H) 2024    ALT 15 2024    PTT 42.1 (H) 01/15/2024    INR 1.60 (H) 01/15/2024    T4F 1.2 2024    TSH 19.180 (H) 2024    LIP 32 2024    MG 1.5 (L) 2024    PHOS 2.7 2024    B12 >2,000 (H) 2024       CT ABDOMEN+PELVIS(CPT=74176)    Result Date: 2024  CONCLUSION:   1. No bowel obstruction, acute appendicitis, abnormal bowel wall thickening, or acute diverticulitis. 2. Persistent bilateral nephrograms, which may be secondary to acute tubular necrosis, hypotension, or bilateral renal artery stenosis. 3. New  striated right nephrogram, which may be secondary to pyelonephritis, acute tubular necrosis, or less likely renal infarcts. 4. No hydronephrosis. 5. Circumferential bladder wall thickening, which is concerning for cystitis.  Recommend correlation with urinalysis. 6. Redemonstrated sequela of chronic pancreatitis. 7. Progressive small to moderate volume of abdominopelvic ascites as well as progressive anasarca, which is suggestive of volume overload. 8. No significant change in the cardiomegaly, small to moderate loculated right pleural effusion, and trace left pleural effusion.  9. Lesser incidental findings described above.    Dictated by (CST): Anshul Joseph MD on 1/18/2024 at 1:21 PM     Finalized by (CST): Anshul Joseph MD on 1/18/2024 at 1:45 PM          CT CARDIAC OVER READ    Result Date: 1/17/2024  CONCLUSION:   Cardiac over-read performed. Please see the separately dictated cardiologist report for further details.  Small to moderate loculated right pleural effusion stable since the prior study with mild probable partial right lower lobe adjacent passive atelectasis.  Trace left pleural effusion also unchanged since the prior study.    Dictated by (CST): Azar Mendez MD on 1/17/2024 at 12:49 PM     Finalized by (CST): Azar Mendez MD on 1/17/2024 at 12:53 PM             Assessment and Plan:   Margaret Silverio is a 78 year old woman with hx of afib on anticoagulation, HTN, seizure disorder who presented to [facility] with SOB and was subsequently found to have acute on chronic anemia. The patient also reported having one to two dark/bloody stools prior to admission. She has a hx of GI bleed in 4/2023 with EGD done showing a potential AVM in the duodenal bulb s/p clip placement.     S/p EGD and colonoscopy 1/17 showing single small transverse colon AVM s/p argon plasma coagulation, and mild gastric erythema.    # anemia, stable  Source of recurrent anemia likely secondary to GI AVM disease,  synchronous small bowel AVMs not excluded. Hgb 8.1, last transfusion was 1/13 when hgb was 7.1.   - continue to monitor hgb  - there is always a risk of recurrent bleeding given the nature of GI AVM disease, will need to weigh risks and benefits of anticoagulation resumption  - may resume anticoagulation today if obligatory     # abdominal pain, resolved   - appears new post colonoscopy with cautery  - CT a/p was unremarkable  - possibly related to insufflation, now resolved  - may advance diet    GI will sign off, please call with questions    Zoraida Rios MD

## 2024-01-19 NOTE — PHYSICAL THERAPY NOTE
PT orders received and chart reviewed. RN approved activity. Patient in bed, eating dinner, requesting therapy to come back in approx 30 minutes so that patient could continue eating.     Second attempt 16:50: Patient in bed, requesting additional time to eat dinner. PT will follow-up tomorrow as appropriate.     Jenna Haase, PT, DPT  [facility]  Ext: [phone]

## 2024-01-20 VITALS
RESPIRATION RATE: 14 BRPM | WEIGHT: 153.69 LBS | OXYGEN SATURATION: 97 % | HEIGHT: 65 IN | SYSTOLIC BLOOD PRESSURE: 113 MMHG | BODY MASS INDEX: 25.61 KG/M2 | HEART RATE: 61 BPM | TEMPERATURE: 98 F | DIASTOLIC BLOOD PRESSURE: 72 MMHG

## 2024-01-20 LAB
ANION GAP SERPL CALC-SCNC: 9 MMOL/L (ref 0–18)
BASOPHILS # BLD AUTO: 0.01 X10(3) UL (ref 0–0.2)
BASOPHILS NFR BLD AUTO: 0.6 %
BUN BLD-MCNC: 14 MG/DL (ref 9–23)
BUN/CREAT SERPL: 9.3 (ref 10–20)
CALCIUM BLD-MCNC: 8.7 MG/DL (ref 8.7–10.4)
CHLORIDE SERPL-SCNC: 106 MMOL/L (ref 98–112)
CO2 SERPL-SCNC: 25 MMOL/L (ref 21–32)
CREAT BLD-MCNC: 1.5 MG/DL
DEPRECATED RDW RBC AUTO: 64.6 FL (ref 35.1–46.3)
EGFRCR SERPLBLD CKD-EPI 2021: 36 ML/MIN/1.73M2 (ref 60–?)
EOSINOPHIL # BLD AUTO: 0.34 X10(3) UL (ref 0–0.7)
EOSINOPHIL NFR BLD AUTO: 21.7 %
ERYTHROCYTE [DISTWIDTH] IN BLOOD BY AUTOMATED COUNT: 19.2 % (ref 11–15)
GLUCOSE BLD-MCNC: 82 MG/DL (ref 70–99)
HCT VFR BLD AUTO: 25.2 %
HGB BLD-MCNC: 7.8 G/DL
IMM GRANULOCYTES # BLD AUTO: 0.01 X10(3) UL (ref 0–1)
IMM GRANULOCYTES NFR BLD: 0.6 %
LYMPHOCYTES # BLD AUTO: 0.44 X10(3) UL (ref 1–4)
LYMPHOCYTES NFR BLD AUTO: 28 %
MAGNESIUM SERPL-MCNC: 1.5 MG/DL (ref 1.6–2.6)
MCH RBC QN AUTO: 30 PG (ref 26–34)
MCHC RBC AUTO-ENTMCNC: 31 G/DL (ref 31–37)
MCV RBC AUTO: 96.9 FL
MONOCYTES # BLD AUTO: 0.09 X10(3) UL (ref 0.1–1)
MONOCYTES NFR BLD AUTO: 5.7 %
NEUTROPHILS # BLD AUTO: 0.68 X10 (3) UL (ref 1.5–7.7)
NEUTROPHILS # BLD AUTO: 0.68 X10(3) UL (ref 1.5–7.7)
NEUTROPHILS NFR BLD AUTO: 43.4 %
OSMOLALITY SERPL CALC.SUM OF ELEC: 290 MOSM/KG (ref 275–295)
PLATELET # BLD AUTO: 109 10(3)UL (ref 150–450)
POTASSIUM SERPL-SCNC: 3.1 MMOL/L (ref 3.5–5.1)
RBC # BLD AUTO: 2.6 X10(6)UL
SODIUM SERPL-SCNC: 140 MMOL/L (ref 136–145)
WBC # BLD AUTO: 1.6 X10(3) UL (ref 4–11)

## 2024-01-20 PROCEDURE — 99239 HOSP IP/OBS DSCHRG MGMT >30: CPT | Performed by: HOSPITALIST

## 2024-01-20 PROCEDURE — 99233 SBSQ HOSP IP/OBS HIGH 50: CPT | Performed by: INTERNAL MEDICINE

## 2024-01-20 RX ORDER — FUROSEMIDE 40 MG/1
40 TABLET ORAL DAILY
Qty: 30 TABLET | Refills: 0 | Status: SHIPPED | OUTPATIENT
Start: 2024-01-21

## 2024-01-20 RX ORDER — FUROSEMIDE 40 MG/1
40 TABLET ORAL DAILY
Status: DISCONTINUED | OUTPATIENT
Start: 2024-01-21 | End: 2024-01-20

## 2024-01-20 RX ORDER — MAGNESIUM OXIDE 400 MG/1
800 TABLET ORAL ONCE
Status: COMPLETED | OUTPATIENT
Start: 2024-01-20 | End: 2024-01-20

## 2024-01-20 RX ORDER — POTASSIUM CHLORIDE 20 MEQ/1
40 TABLET, EXTENDED RELEASE ORAL ONCE
Status: COMPLETED | OUTPATIENT
Start: 2024-01-20 | End: 2024-01-20

## 2024-01-20 RX ORDER — PANTOPRAZOLE SODIUM 40 MG/1
40 TABLET, DELAYED RELEASE ORAL
Qty: 60 TABLET | Refills: 0 | Status: SHIPPED | OUTPATIENT
Start: 2024-01-20

## 2024-01-20 NOTE — PROGRESS NOTES
Progress Note  Margaret Jonny Silverio Patient Status:  Inpatient    3/14/1945 MRN [id number]   Location [facility] 3W/SW Attending Katiana Ochoa MD   Hosp Day # 7 PCP Johnathon Rodriguez DO     Subjective:  Denies cardiac complaints, c/o head pain  Cr stable around 1.5    Objective:  /63 (BP Location: Left arm)   Pulse 62   Temp 97.8 °F (36.6 °C) (Oral)   Resp 16   Ht 5' 5\" (1.651 m)   Wt 153 lb 11.2 oz (69.7 kg)   SpO2 98%   BMI 25.58 kg/m²     Telemetry: AV paced    Intake/Output:    Intake/Output Summary (Last 24 hours) at 2024 1016  Last data filed at 2024 0848  Gross per 24 hour   Intake 450 ml   Output 300 ml   Net 150 ml     Last 3 Weights   24 0536 153 lb 11.2 oz (69.7 kg)   24 0441 151 lb 8 oz (68.7 kg)   24 0535 146 lb (66.2 kg)   24 1312 146 lb (66.2 kg)   24 0432 146 lb (66.2 kg)   24 0430 151 lb (68.5 kg)   01/15/24 0536 153 lb (69.4 kg)   24 0119 150 lb 5.7 oz (68.2 kg)   244 140 lb (63.5 kg)   23 1412 145 lb (65.8 kg)   23 0818 145 lb (65.8 kg)     Labs:  Recent Labs   Lab 24  0613 24  0538 24  0531   * 83 82   BUN 11 13 14   CREATSERUM 1.31* 1.54* 1.50*   EGFRCR 42* 35* 36*   CA 9.0 9.1 8.7   * 141 140   K 3.4*  3.4* 3.6  3.6 3.1*    107 106   CO2 27.0 28.0 25.0     Recent Labs   Lab 24  0613 24  0538 24  0531   RBC 2.68* 2.66* 2.60*   HGB 8.1* 7.9* 7.8*   HCT 25.7* 26.1* 25.2*   MCV 95.9 98.1 96.9   MCH 30.2 29.7 30.0   MCHC 31.5 30.3* 31.0   RDW 19.1* 19.5* 19.2*   NEPRELIM 1.33* 1.15* 0.68*   WBC 2.4* 2.1* 1.6*   PLT 83.0* 101.0* 109.0*     No results for input(s): \"TROP\", \"TROPHS\", \"CK\" in the last 168 hours.    Lab Results   Component Value Date/Time    HDL 51 2023 05:01 AM    LDL 20 2023 05:01 AM    TRIG 99 2023 05:01 AM     No results found for: \"DDIMER\"  Lab Results   Component Value Date    TSH 19.180 (H)  01/12/2024     Review of Systems:   Constitutional: No fevers, chills, fatigue or night sweats.  ENT: No mouth pain, neck pain, running nose, headaches or swollen glands.  Skin: No rashes, pruritus or skin changes,  Respiratory: Denies cough, wheezing or shortness of breath.  CV: Denies chest pain, palpitations, orthopnea, PND or dizziness.  Musculoskeletal: No joint pain, stiffness or swelling.  GI: No nausea, vomiting or diarrhea. No blood in stools.  Neurologic: No seizures, tremors, weakness or numbness.     Physical Exam:  General: Alert, cooperative, no distress, appears stated age.  Neck: Supple, symmetrical, trachea midline, no adenopathy, thyroid: no enlargment/tenderness/nodules, no carotid bruit and no JVD.  Lungs: fine crackles right base, clear otherwise.  Chest wall: No tenderness or deformity.  Heart: Regular rate and rhythm, S1, S2 normal, no murmur, click, rub or gallop.  Abdomen: Soft, non-tender. Bowel sounds normal. No masses,  No organomegaly.  Extremities: Extremities normal, atraumatic, no cyanosis, mild BLE edema.  Pulses: 2+ and symmetric all extremities.  Neurologic: Grossly intact.    Medications:   furosemide  40 mg Intravenous Daily    amitriptyline  25 mg Oral Nightly    carvedilol  25 mg Oral BID    sacubitril-valsartan  1 tablet Oral BID    piperacillin-tazobactam  3.375 g Intravenous Q8H    pantoprazole  40 mg Intravenous Q12H     Assessment:    Recurrent GIB  Presented with melena, hgb drop  -EGD/colonoscopy 1/17 with small transverse colon AVM, s/p transfusion PRBC, FFP  -Hgb 7.8, stable ~8  -hx multiple GIB  -holding OAC  -CCTA for watchman eval did not clearly visualize the BRANDYN  -trying to obtain records from [facility] in [place] to determine if closure device was placed during mitral valve surgery    Paroxysmal atrial fibrillation   AV paced on tele ([place] [place] dual chamber ICD)  -previously discussed watchman as outpatient, but did not want to pursue further  -now  with recurrent GIB  -eliquis on hold   -BB    HFrEF  NICM, LVEF 35%, s/p [place] DC-ICD 9/2023 for persistent low EF <35% despite 3 months GDMT  -coreg, low dose entresto, lasix  -I/Os net pos 6.6L, UOP not accurately documented  -weight up from admission     Severe sepsis  2/2 CHF, pneumonia  -CT chest with small loculated R and small L pleural effusions  -lactic 12 on admit, WBC low, afebrile  -blood cx negative  -diuresing with IV lasix as above  -IV abx per primary    SSS  S/p DC-ICD 9/2023 as above  -AV paced on tele    Mitral valve bioprosthesis  Performed in [place], trying to obtain records  -mean gradient 5mmHg    HTN  Normotensive  -entresto, coreg    Severe tricuspide valve regurgitation  -IV lasix    Hypokalemia  K+ 3.1  today  -electrolyte protocol     Hypomagnesemia  Mg 1.5  today  -replete per protocol    Pancytopenia  Heme onc following  -WBC 1.6, plt 109    Abdominal pain   Resolving  -clear liquid diet per GI    Rheumatoid arthritis    Plan:  -Continue coreg, entresto   -Transition to PO lasix daily  -Monitor strict I/Os, daily weight, BMP  -Continue to hold eliquis until cleared to resume per GI with how Hgb  -Trying to get records from previous hospital   -Outpatient watchman   -Further recs per GI/primary  -outpatient follow up with Arie Boles, heart failure clinic    Plan of care discussed with patient, RN.      KODI Gayle  01/20/24  10:25 AM  [phone] [place]  [phone] [place]      Cardiologist addendum:  Patient seen and examined independently.  Agree with above documentation provided by YOLIE Pierce.  Patient stable from cardiac standpoint.  Continue Coreg, oral Lasix, Entresto at current doses.    Thank you for allowing me to take part in the care of Margaret Silverio. Please call with any questions of concerns.    L3    Dr. Adriana Myers

## 2024-01-20 NOTE — PROGRESS NOTES
[facility]    Progress Note    Margaret Silverio Patient Status:  Inpatient    3/14/1945 MRN [id number]   Location [facility] 3W/SW Attending Katiana Ochoa MD   Hosp Day # 7 PCP Johnathon Rodriguez, DO       Subjective:   Margaret Silverio is a(n) 78 year old female     ROS:     Constitutional:  Negative for decreased activity, fever, irritability and lethargy  ENMT:  Negative for ear drainage, hearing loss and nasal drainage  Eyes:  Negative for eye discharge and vision loss  Cardiovascular:  Negative for chest pain, sob, irregular heartbeat/palpitations  Respiratory:  Negative for cough, dyspnea and wheezing  Gastrointestinal:  Negative for abdominal pain, constipation, decreased appetite, diarrhea and vomiting  Genitourinary:  Negative for dysuria and hematuria  Endocrine:  Negative for abnormal sleep patterns, increased activity, polydipsia and polyphagia  Hema/Lymph:  Negative for easy bleeding and easy bruising  Integumentary:  Negative for pruritus and rash  Musculoskeletal:  Negative for bone/joint symptoms  Neurological:  Negative for gait disturbance  Psychiatric:  Negative for inappropriate interaction and psychiatric symptoms      Vitals:    24 1436   BP: 113/72   Pulse: 61   Resp: 14   Temp: 98.2 °F (36.8 °C)           PHYSICAL EXAM:   Constitutional: appears well hydrated alert and responsive no acute distress noted  Head/Face: normocephalic  Eyes/Vision: normal extraocular motion is intact  Nose/Mouth/Throat:mucous membranes are moist and no oral lesions are noted  Neck/Thyroid: neck is supple without adenopathy  Lymphatic: no abnormal cervical, supraclavicular adenopathy is noted  Respiratory:  lungs are clear to auscultation bilaterally, normal respiratory effort  Cardiovascular: regular rate and rhythm no murmurs, gallups, or rubs  Abdomen: soft, non-tender, non-distended, BS normal  Vascular: well perfused femoral, and pedal pulses normal  Skin/Hair: no  unusual rashes present, no abnormal bruising noted  Back/Spine: no abnormalities noted  Musculoskeletal: full ROM all extremities good strength  no deformities  Extremities trace edema  Neurological:  Grossly normal    Results:     Laboratory Data:  Lab Results   Component Value Date    WBC 1.6 (L) 01/20/2024    HGB 7.8 (L) 01/20/2024    HCT 25.2 (L) 01/20/2024    .0 (L) 01/20/2024    CREATSERUM 1.50 (H) 01/20/2024    BUN 14 01/20/2024     01/20/2024    K 3.1 (L) 01/20/2024     01/20/2024    CO2 25.0 01/20/2024    GLU 82 01/20/2024    CA 8.7 01/20/2024    ALB 3.2 01/19/2024    ALKPHO 110 01/13/2024    BILT 1.6 (H) 01/13/2024    TP 5.6 (L) 01/13/2024    AST 38 (H) 01/13/2024    ALT 15 01/13/2024    PTT 42.1 (H) 01/15/2024    INR 1.60 (H) 01/15/2024    T4F 1.2 01/12/2024    TSH 19.180 (H) 01/12/2024    LIP 32 01/13/2024    MG 1.5 (L) 01/20/2024    PHOS 2.7 01/19/2024    B12 >2,000 (H) 01/14/2024       Imaging:  [unfilled]   No results found.      ASSESSMENT/PLAN:   Assessment       #1 renal insufficiency    Creatinine about 1.2 now a little higher will need to follow-up with me as an outpatient     #2 chronic inflammatory syndrome  Continue methotrexate when better  #3 heart failure on Entresto and Coreg  EF 35% Home with Lasix increased dose of 40 mg/day review reviewed with patient watch salt and fluids  #4 sepsis antibiotics complete to my knowledge  #5 A-fib was on Eliquis held because of severe GI bleed follow-up CBC as outpatient  6 GI bleed home with Protonix  7 low potassium replaced  #8 low white blood count hold methotrexate until it improves  Follow-up with me in about 3 weeks we will get labs as an outpatient discussed with Dr. Mayorga   And nurse grover  1/20/2024  Ayush Key MD

## 2024-01-20 NOTE — DISCHARGE SUMMARY
[facility]    Discharge Summary    Margaret Silverio Patient Status:  Inpatient    3/14/1945 MRN [id number]   Location [facility] 3W/SW Attending Katiana Ochoa MD   Hosp Day # 7 PCP Johnathon Rodriguez DO     Date of Admission: 2024      Date of Discharge: 24      Admitting Diagnosis: Melena [K92.1]  Lactic acidosis [E87.20]  Coagulopathy (HCC) [D68.9]  Anticoagulated [Z79.01]  SIRS (systemic inflammatory response syndrome) (HCC) [R65.10]  Hypothermia, initial encounter [T68.XXXA]  Dyspnea, unspecified type [R06.00]    Hospital Discharge Diagnoses:  GI bleed    Lace+ Score: 77  59-90 High Risk  29-58 Medium Risk  0-28   Low Risk.    TCM Follow-Up Recommendation:  LACE > 58: High Risk of readmission after discharge from the hospital.          Problem List:   Patient Active Problem List   Diagnosis    Altered mental status    Altered mental status, unspecified altered mental status type    Seizure (HCC)    Lactic acidosis    Leukocytosis    Acute GI bleeding    GEORGE (acute kidney injury) (HCC)    Thrombocytopenia (HCC)    Metabolic acidosis    Atrial fibrillation, chronic (HCC)    Sepsis due to urinary tract infection  (HCC)    Sepsis due to Escherichia coli (HCC)    ABLA (acute blood loss anemia)    Melena    Atrial fibrillation with rapid ventricular response (HCC)    Acute chest pain    Acute on chronic congestive heart failure, unspecified heart failure type (HCC)    Pleural effusion    ACC/AHA stage B systolic heart failure (HCC)    Congestive heart failure (HCC)    Coronary arteriosclerosis    Essential (primary) hypertension    Mitral valve stenosis    Rheumatoid arthritis (HCC)    Stage 3 chronic kidney disease (HCC)    Atrial flutter (HCC)    Acute on chronic HFrEF (heart failure with reduced ejection fraction) (HCC)    Dyspnea, unspecified type    Hypothermia, initial encounter    Anticoagulated    Coagulopathy (HCC)    SIRS (systemic inflammatory response  Patient called needing referral for Dr Meadows for peptic ulcer disease. Patient to drop off copy of insurance card   syndrome) (HCC)    Anemia due to stage 3 chronic kidney disease  (HCC)    Anemia due to GI blood loss    Anemia of chronic disease    Lymphopenia         Physical Exam:     Gen: No acute distress  Pulm: Lungs clear, normal respiratory effort  CV: Heart with regular rate and rhythm  Abd: Abdomen soft, nontender, nondistended, bowel sounds present  Neuro: No acute focal deficits  MSK: Full range of motion in extremities  Skin: Warm and dry  Psych: Normal affect  Ext: no c/c/e      History of Present Illness: Per admit md Margaret Grossrae Silverio is a(n) 78 year old female, with a past medical history significant for atrial fibrillation, currently on Eliquis, hypertension, chronic systolic heart failure and status post pacemaker placement earlier in September was brought to the ER with increasing shortness of breath fatigue and melanotic stools over the last week.  Some mention of abdominal pain earlier in the day.  Patient currently on BiPAP, difficult to understand however denying any pain, claims her breathing is much improved.  No nausea vomiting or chest pain    Hospital Course:     Acute upper GI bleed   Acute blood loss anemia  Chronic anemia   - Patient with 1 week of diarrhea and melanotic stools PTA  - Hb 7.1 lowest. Trending up now   - GI on consult.   - hold Eliquis.   - transfuse for Hb < 7.0 or PLT < 10K or < 50K with active bleeding   - s/p FFP 1 unit and 1 unit PRBC.   - h/o EGD 04/23' with AVM s/p hemoclip   - EGD/CLN done 1/17 and colon AVM s/p APC  - Now tolerating diet     Acute hypoxemic respiratory failure   - secondary to CHF and RLL PNA   - off NIPPV. Off o2 NC   - Pulmonary on consult.   - Lasix 20mg IV BID - changed to daily  - IV zosyn empirically   - blood cx 2/2 NGTD. Genexpert neg,      Acute pancytopenia   - Likely related to severe sepsis on admit.   - retic ct 13, B12 ok, folate pending. Hemolysis labs negative.   - HONC on consult - fu as outpt     Severe sepsis   - secondary to PNA   -  Lactic acid 12 on admit   - blood cx x 2 neg   - CXR possible RLL PNA   - IV zosyn   - CT chest small loculated R and small L pleural effusion no consolidation. CT abd pelvis chronic pancreatitis   - Ucx negative      Acute on Chronic combined CHF   Severe Tricuspid valve regurgitation  Mitral valve bioprosthesis  NICM s/p ICD 9/2023  - ECHO from 10/2023 with LVEF 37% grade 4 TR.   - Cards consult.   - Lasix 20mg IV BID  --> changed to oral daily  - coreg 25mg BID   - Entresto   - daily weights , I/O      Paroxysmal A.fib   - hold home Eiquis   - rate controlled.   - consider eventual outpt watchman with recurrent GI bleed     Acute kidney injury on CKD III  - Improved but worsening again - will get renal consult, appreciate recs  - Possible ATN from hypoperfusion/anemia vs sepsis  - baseline creat 1.3 per EMR   - Monitor UOP   - UA with hyaline casts indicative of decreased renal perfusion      LUE edema  - elevate. Hot packs   - US negative for DVT.   - IV infiltrated overnight 1/13      Other medical problems  H/o RA  H/o small bowel AVM's    Discharge Condition: Stable    Discharge Medications:      Discharge Medications        CHANGE how you take these medications        Instructions Prescription details   furosemide 40 MG Tabs  Commonly known as: Lasix  Start taking on: January 21, 2024  What changed:   medication strength  how much to take      Take 1 tablet (40 mg total) by mouth daily.   Quantity: 30 tablet  Refills: 0     HYDROcodone-acetaminophen  MG Tabs  Commonly known as: B[place]  What changed: Another medication with the same name was removed. Continue taking this medication, and follow the directions you see here.      Take 1 tablet by mouth every 6 (six) hours as needed for Pain.   Quantity: 120 tablet  Refills: 0     pantoprazole 40 MG Tbec  Commonly known as: Protonix  What changed: when to take this      Take 1 tablet (40 mg total) by mouth 2 (two) times daily before meals.   Quantity: 60  tablet  Refills: 0            CONTINUE taking these medications        Instructions Prescription details   alendronate 70 MG Tabs  Commonly known as: Fosamax      Take 1 tablet (70 mg total) by mouth once a week.   Quantity: 12 tablet  Refills: 0     amitriptyline 25 MG Tabs  Commonly known as: Elavil      TAKE 1 TABLET(25 MG) BY MOUTH EVERY NIGHT   Quantity: 30 tablet  Refills: 0     carvedilol 25 MG Tabs  Commonly known as: Coreg      Take 1 tablet (25 mg total) by mouth 2 (two) times daily.   Quantity: 60 tablet  Refills: 0     Entresto 24-26 MG Tabs  Generic drug: sacubitril-valsartan      Take 1 tablet by mouth 2 (two) times daily.   Quantity: 180 tablet  Refills: 0     ferrous sulfate 325 (65 FE) MG Tbec      Take 1 tablet (325 mg total) by mouth daily with breakfast.   Refills: 0     folic acid 800 MCG Tabs  Commonly known as: FOLVITE      Take 1 tablet (800 mcg total) by mouth daily.   Quantity: 90 tablet  Refills: 1     methotrexate 2.5 MG Tabs  Commonly known as: Rheumatrex      TAKE 6 TABLETS BY MOUTH 1 TIME A WEEK   Quantity: 77 tablet  Refills: 0            STOP taking these medications      apixaban 5 MG Tabs  Commonly known as: Eliquis        pregabalin 75 MG Caps  Commonly known as: Lyrica                  Where to Get Your Medications        These medications were sent to [id number] [place], [place] - [address], [phone], [phone] [address], [facility] [address]      Phone: [phone]   furosemide 40 MG Tabs  HYDROcodone-acetaminophen  MG Tabs  pantoprazole 40 MG Tbec             Katiana Ochoa MD  1/20/2024  1:49 PM    Greater than 30 minutes spent on preparation and coordination of this discharge

## 2024-01-20 NOTE — PHYSICAL THERAPY NOTE
PHYSICAL THERAPY EVALUATION - INPATIENT     Room Number: 342/342-A  Evaluation Date: 1/20/2024  Type of Evaluation: Initial   Physician Order: PT Eval and Treat    Presenting Problem: systemic inflammatory response syndrome     Reason for Therapy: Mobility Dysfunction and Discharge Planning    PHYSICAL THERAPY ASSESSMENT     Patient is a 78 year old female admitted 1/12/2024 for systemic inflammatory response syndrome.  Patient's current functional deficits include pain, weakness, decreased activity tolerance, impaired balance and functional mobility, which are below the patient's pre-admission status. Prior to admission, pt was living at home with supportive  and independent with ADLs and mobility without assistive device. At this time, pt is below functional baseline but does have the physical skills and ability to return home safely upon DC. The patient's Approx Degree of Impairment: 50.57% has been calculated based on documentation in the [facility] '6 clicks' Inpatient Basic Mobility Short Form.  Research supports that patients with this level of impairment may benefit from CÉSAR however PT anticipates pt on track to DC home with  PT.Patient will benefit from continued IP PT services to address these deficits in preparation for discharge.    In this evaluation, RN approved participation with physical therapy. Pt was received resting in bed and agreeable to activity. Educated on role of PT and PT plan of care, goals for this session. Pt verbalized understanding. Pt with bilateral hand RA with difficulty grasping objects. C/o pain in bilat hands and BLE.   Bed mobility: min A for supine to/from sit EOB, assistance for both trunk support and BLE movement. Benefits from increased time due to pain.   Transfers: CGA for STS with RW   Gait: ambulated 30 ft with RW and CGA, slow, shuffled gait, flexed posture, guarded. Frequent pauses while walking. Slightly unsteady but no LOB. After 15 ft pt c/o dizziness;  redirected back to bed. VSS sitting at EOB. (YW=571/63, HR 59 bpm, YCS6=396% on RA)     Per discussion with pt, feels her  is healthy and supportive, will be able to assist her at home and has no concerns about safe mobility and DC home likely today. Pt was left resting in bed with needs within reach, bed alarm on, handoff to RN complete.    DISCHARGE RECOMMENDATIONS  PT Discharge Recommendations: Home with home health PT    PLAN  PT Treatment Plan: Bed mobility;Body mechanics;Endurance;Energy conservation;Patient education;Gait training;Strengthening;Transfer training;Balance training  Rehab Potential : Good  Frequency (Obs): 5x/week    PHYSICAL THERAPY MEDICAL/SOCIAL HISTORY     Problem List  Principal Problem:    SIRS (systemic inflammatory response syndrome) ([facility])  Active Problems:    Lactic acidosis    Melena    Dyspnea, unspecified type    Hypothermia, initial encounter    Anticoagulated    Coagulopathy (HCC)    Anemia due to stage 3 chronic kidney disease  (HCC)    Anemia due to GI blood loss    Anemia of chronic disease    Lymphopenia    HOME SITUATION  Home Situation  Type of Home: House  Home Layout: One level  Stairs to Enter : 0  Lives With: Spouse  Drives: No  Patient Owned Equipment: Rolling walker  Patient Regularly Uses: None     Prior Level of [place]: independent with ADLs and mobility without assistive device    SUBJECTIVE  Agreeable to activity.     PHYSICAL THERAPY EXAMINATION     OBJECTIVE  Precautions: Bed/chair alarm;Limb alert - right  Fall Risk: High fall risk    WEIGHT BEARING RESTRICTION  Weight Bearing Restriction: None    PAIN ASSESSMENT  Rating:  (did not rate)  Location: bilateral hands, BLE  Management Techniques: Activity promotion;Body mechanics;Repositioning    COGNITION  Overall Cognitive Status:  WFL - within functional limits    RANGE OF MOTION AND STRENGTH ASSESSMENT  Upper extremity ROM and strength are within functional limits.  Lower extremity ROM is within  functional limits.  Lower extremity strength is within functional limits.    BALANCE  Static Sitting: Good  Dynamic Sitting: Fair +  Static Standing: Fair -  Dynamic Standing: Fair -    ACTIVITY TOLERANCE  Pulse: 59  Heart Rate Source: Monitor  BP: 116/66  BP Location: Left arm  BP Method: Automatic  Patient Position: Sitting    O2 WALK  Oxygen Therapy  SPO2% Ambulation on Room Air: 100    AM-PAC '6-Clicks' INPATIENT SHORT FORM - BASIC MOBILITY  How much difficulty does the patient currently have...  Patient Difficulty: Turning over in bed (including adjusting bedclothes, sheets and blankets)?: A Little   Patient Difficulty: Sitting down on and standing up from a chair with arms (e.g., wheelchair, bedside commode, etc.): A Little   Patient Difficulty: Moving from lying on back to sitting on the side of the bed?: A Little   How much help from another person does the patient currently need...   Help from Another: Moving to and from a bed to a chair (including a wheelchair)?: A Little   Help from Another: Need to walk in hospital room?: A Little   Help from Another: Climbing 3-5 steps with a railing?: A Lot     AM-PAC Score:  Raw Score: 17   Approx Degree of Impairment: 50.57%   Standardized Score (AM-PAC Scale): 42.13   CMS Modifier (G-Code): CK    FUNCTIONAL ABILITY STATUS  Functional Mobility/Gait Assessment  Gait Assistance: Contact guard assist  Distance (ft): 30  Assistive Device: Rolling walker  Pattern: Shuffle (slow pace, unsteady)    Bed Mobility: CGA    Transfers: CGA    Exercise/Education Provided:  Bed mobility  Body mechanics  Energy conservation  Functional activity tolerated  Gait training  Posture  Transfer training    Patient End of Session: In bed;Needs met;Call light within reach;RN aware of session/findings;All patient questions and concerns addressed;Alarm set    CURRENT GOALS    Goals to be met by: 1/27/24  Patient Goal Patient's self-stated goal is: go home   Goal #1 Patient is able to  demonstrate supine - sit EOB @ level: supervision     Goal #1   Current Status    Goal #2 Patient is able to demonstrate transfers Sit to/from Stand at assistance level: supervision with walker - rolling     Goal #2  Current Status    Goal #3 Patient is able to ambulate 75 feet with assist device: walker - rolling at assistance level: supervision   Goal #3   Current Status    Goal #4    Goal #4   Current Status    Goal #5 Patient to demonstrate independence with home activity/exercise instructions provided to patient in preparation for discharge.   Goal #5   Current Status    Goal #6    Goal #6  Current Status      Patient Evaluation Complexity Level:  History Moderate - 1 or 2 personal factors and/or co-morbidities   Examination of body systems Moderate - addressing a total of 3 or more elements   Clinical Presentation Moderate - Evolving   Clinical Decision Making Moderate Complexity       Therapeutic Activity: 20 minutes

## 2024-01-20 NOTE — CONSULTS
[facility]    Report of Consultation    Margaret Silverio Patient Status:  Inpatient    3/14/1945 MRN [id number]   Location [facility] 3W/SW Attending Katiana Ochoa MD   Hosp Day # 7 PCP Johnathon Rodriguez DO     Date of Admission:  2024  Date of Consult:  2024  Reason for Consultation:   Bump in creat    History of Present Illness:   Patient is a 78 year old female who was admitted to the hospital for SIRS (systemic inflammatory response syndrome) (HCC):  Very complicated lady  historyof afib, prev sepsis last year   Had creat up to 3.6 when seen summer 2023 but now baserline around 1.35   Hx epilepsy htn   Has rheum sx SIRS    on methotrexate weekly   has osteroporosis   Hx chf  on carvedilol  lastix entresto      Mitral valve replaced on eliquis  has pacer systolic chf   EF  35%   mitral valve replacement  Tricuspid regurg  Cam e to er with melanotic stools sob  Creat up 1.31 yesterday  1.54 today no I and o abailable weights not accurate  On admit   hg 8.6  later dropped to 7.1  req blood preoducts   Colonoscoopy small avm  stomach mild erythema   Haptoglobin also low  heme doesn't think hemolytic process  Did receive plts and blood    b12 iron folate ok  Was also on zosyn for pna    Overall feels better now room air  urinating without difficults    Past Medical History  Past Medical History:   Diagnosis Date    Anemia     Arrhythmia     Arthritis     Deep vein thrombosis (HCC)     Essential hypertension     High blood pressure     History of blood transfusion     Seizure disorder (HCC)     Seizures (HCC)        Past Surgical History  Past Surgical History:   Procedure Laterality Date    COLONOSCOPY N/A 2024    Dr. Rios    EGD N/A 2024    Dr. Rios    OTHER SURGICAL HISTORY  2017    Heart Surgery     OTHER SURGICAL HISTORY      Bilateral shoulder replacement        Family History  History reviewed. No pertinent family history.    Social  History  Social History     Socioeconomic History    Marital status:      Spouse name: Not on file    Number of children: Not on file    Years of education: Not on file    Highest education level: Not on file   Occupational History    Not on file   Tobacco Use    Smoking status: Former     Packs/day: .25     Types: Cigarettes     Quit date: 2014     Years since quitting: 10.0    Smokeless tobacco: Never   Vaping Use    Vaping Use: Never used   Substance and Sexual Activity    Alcohol use: Never    Drug use: Never    Sexual activity: Not on file   Other Topics Concern    Not on file   Social History Narrative    Not on file     Social Determinants of Health     Financial Resource Strain: Not on file   Food Insecurity: No Food Insecurity (1/13/2024)    Food Insecurity     Food Insecurity: Never true   Transportation Needs: No Transportation Needs (1/13/2024)    Transportation Needs     Lack of Transportation: No   Physical Activity: Not on file   Stress: Not on file   Social Connections: Not on file   Housing Stability: Low Risk  (1/13/2024)    Housing Stability     Housing Instability: No     Housing Instability Emergency: Not on file          Current Medications:  Current Facility-Administered Medications   Medication Dose Route Frequency    furosemide (Lasix) 10 mg/mL injection 40 mg  40 mg Intravenous Daily    metoprolol tartrate (Lopressor) tab 50 mg  50 mg Oral Once PRN    Or    metoprolol tartrate (Lopressor) tab 100 mg  100 mg Oral Once PRN    amitriptyline (Elavil) tab 25 mg  25 mg Oral Nightly    carvedilol (Coreg) tab 25 mg  25 mg Oral BID    sacubitril-valsartan (Entresto) 24-26 MG per tab 1 tablet  1 tablet Oral BID    ondansetron (Zofran) 4 MG/2ML injection 4 mg  4 mg Intravenous Q6H PRN    piperacillin-tazobactam (Zosyn) 3.375 g in dextrose 5% 100 mL IVPB-ADDV  3.375 g Intravenous Q8H    pantoprazole (Protonix) 40 mg in sodium chloride 0.9% PF 10 mL IV push  40 mg Intravenous Q12H     HYDROcodone-acetaminophen (Norco) 5-325 MG per tab 1 tablet  1 tablet Oral Q6H PRN     Medications Prior to Admission   Medication Sig    HYDROcodone-acetaminophen  MG Oral Tab Take 1 tablet by mouth every 8 (eight) hours as needed for Pain.    apixaban 5 MG Oral Tab Take 1 tablet (5 mg total) by mouth 2 (two) times daily.    AMITRIPTYLINE 25 MG Oral Tab TAKE 1 TABLET(25 MG) BY MOUTH EVERY NIGHT    methotrexate 2.5 MG Oral Tab TAKE 6 TABLETS BY MOUTH 1 TIME A WEEK    alendronate 70 MG Oral Tab Take 1 tablet (70 mg total) by mouth once a week.    ENTRESTO 24-26 MG Oral Tab Take 1 tablet by mouth 2 (two) times daily.    pantoprazole 40 MG Oral Tab EC Take 1 tablet (40 mg total) by mouth daily.    folic acid 800 MCG Oral Tab Take 1 tablet (800 mcg total) by mouth daily.    pregabalin 75 MG Oral Cap Take 1 capsule (75 mg total) by mouth 3 (three) times daily. (Patient not taking: Reported on 1/14/2024)    furosemide 20 MG Oral Tab Take 0.5 tablets (10 mg total) by mouth daily.    ferrous sulfate 325 (65 FE) MG Oral Tab EC Take 1 tablet (325 mg total) by mouth daily with breakfast.    carvedilol 25 MG Oral Tab Take 1 tablet (25 mg total) by mouth 2 (two) times daily.       Allergies  Allergies   Allergen Reactions    Lisinopril Coughing         Review of Systems:   Constitutional:feel smuch better  Eyes: negative for irritation, redness and visual disturbance  Ears, nose, mouth, throat, and face: negative for hearing loss and sore throat  Respiratory: negative for cough, hemoptysis and wheezing  Cardiovascular: negative for chest pain, exertional dyspnea, lower extremity edema and palpitations  Gastrointestinal: negative for abdominal pain, diarrhea and nausea  Genitourinary:negative for dysuria, frequency and hematuria  Hematologic/lymphatic: negative for bleeding and easy bruising  Musculoskeletal:negative for back pain, bone pain and muscle weakness  Neurological: negative for gait problems, memory problems and  seizures  Behavioral/Psych: negative for anxiety and depression  Endocrine: negative for polyuria and weight loss     Physical Exam:   Blood pressure 121/75, pulse 60, temperature 97.5 °F (36.4 °C), temperature source Oral, resp. rate 18, height 5' 5\" (1.651 m), weight 151 lb 8 oz (68.7 kg), SpO2 95%.    Intake/Output Summary (Last 24 hours) at 1/20/2024 0119  Last data filed at 1/19/2024 1131  Gross per 24 hour   Intake 230 ml   Output 151 ml   Net 79 ml     Wt Readings from Last 3 Encounters:   01/19/24 151 lb 8 oz (68.7 kg)   12/26/23 145 lb (65.8 kg)   09/21/23 145 lb (65.8 kg)       General appearance: alert, appears stated age and cooperative  Head: Normocephalic, atraumatic  Eyes: conjunctivae/corneas clear  Throat: lips, mucosa, and tongue normal; teeth and gums normal  Neck:  no JVD, supple, symmetrical  Pulmonary:  clear to auscultation bilaterally  Cardiovascular: S1, S2 normal, no rub or gallop, regular rate and rhythm  Abdominal: soft, non-tender; non distended, bowel sounds normal   Extremities: extremities normal, 1+ edema  Pulses: pedal pulses palpable  Skin: No rashes or lesions  Lymph nodes: Cervical, supraclavicular normal.  Neurologic: Grossly normal  Psychiatric: calm    Results:     Laboratory Data:  Lab Results   Component Value Date    WBC 2.1 (L) 01/19/2024    HGB 7.9 (L) 01/19/2024    HCT 26.1 (L) 01/19/2024    .0 (L) 01/19/2024    CREATSERUM 1.54 (H) 01/19/2024    BUN 13 01/19/2024     01/19/2024    K 3.6 01/19/2024    K 3.6 01/19/2024     01/19/2024    CO2 28.0 01/19/2024    GLU 83 01/19/2024    CA 9.1 01/19/2024    ALB 3.2 01/19/2024    ALKPHO 110 01/13/2024    BILT 1.6 (H) 01/13/2024    TP 5.6 (L) 01/13/2024    AST 38 (H) 01/13/2024    ALT 15 01/13/2024    PTT 42.1 (H) 01/15/2024    INR 1.60 (H) 01/15/2024    T4F 1.2 01/12/2024    TSH 19.180 (H) 01/12/2024    LIP 32 01/13/2024    MG 1.5 (L) 01/19/2024    PHOS 2.7 01/19/2024    B12 >2,000 (H) 01/14/2024        Imaging:  [unfilled]   CT ABDOMEN+PELVIS(CPT=74176)    Result Date: 1/18/2024  CONCLUSION:   1. No bowel obstruction, acute appendicitis, abnormal bowel wall thickening, or acute diverticulitis. 2. Persistent bilateral nephrograms, which may be secondary to acute tubular necrosis, hypotension, or bilateral renal artery stenosis. 3. New striated right nephrogram, which may be secondary to pyelonephritis, acute tubular necrosis, or less likely renal infarcts. 4. No hydronephrosis. 5. Circumferential bladder wall thickening, which is concerning for cystitis.  Recommend correlation with urinalysis. 6. Redemonstrated sequela of chronic pancreatitis. 7. Progressive small to moderate volume of abdominopelvic ascites as well as progressive anasarca, which is suggestive of volume overload. 8. No significant change in the cardiomegaly, small to moderate loculated right pleural effusion, and trace left pleural effusion.  9. Lesser incidental findings described above.    Dictated by (CST): Anshul Joseph MD on 1/18/2024 at 1:21 PM     Finalized by (CST): Anshul Joseph MD on 1/18/2024 at 1:45 PM                Impression:     Patient Active Problem List   Diagnosis    Altered mental status    Altered mental status, unspecified altered mental status type    Seizure (HCC)    Lactic acidosis    Leukocytosis    Acute GI bleeding    GEORGE (acute kidney injury) (HCC)    Thrombocytopenia (HCC)    Metabolic acidosis    Atrial fibrillation, chronic (HCC)    Sepsis due to urinary tract infection  (HCC)    Sepsis due to Escherichia coli (HCC)    ABLA (acute blood loss anemia)    Melena    Atrial fibrillation with rapid ventricular response (HCC)    Acute chest pain    Acute on chronic congestive heart failure, unspecified heart failure type (HCC)    Pleural effusion    ACC/AHA stage B systolic heart failure (HCC)    Congestive heart failure (HCC)    Coronary arteriosclerosis    Essential (primary) hypertension    Mitral valve  stenosis    Rheumatoid arthritis (HCC)    Stage 3 chronic kidney disease (HCC)    Atrial flutter (HCC)    Acute on chronic HFrEF (heart failure with reduced ejection fraction) (HCC)    Dyspnea, unspecified type    Hypothermia, initial encounter    Anticoagulated    Coagulopathy (HCC)    SIRS (systemic inflammatory response syndrome) (HCC)    Anemia due to stage 3 chronic kidney disease  (HCC)    Anemia due to GI blood loss    Anemia of chronic disease    Lymphopenia        Recommendations:  1  renal insufficiency baseline creat 1.35  now 1.54  feels ok. Urine unremarkablwe   Recommend renal follow up w pt in office, gave her my number    2  chronic inflammatory disease  SIRS  per rheum   is leukopenic  would hold mtx    3 gi bleeding  now off anticoags  on PPI  follow hg 7.8  today had gastric erythema   avm colon  May need outpt epogen    4  lactic acidosis  completed antibiotic    5 afib  rate controlled w coreg    6 chf  onentresto  coreg  home w lasix 40 q day  Has mitral valve prothesis   EF 40% follow w cards    Discussed w dr finley    Thanks will follow w you  [place] w nurse and pt    Thank you for allowing me to participate in the care of your patient.    Ayush Key MD  1/20/2024

## 2024-01-20 NOTE — PLAN OF CARE
PRN pain meds given. Bladder scanned @ 0230; 274ml noted. Pt did have an incont episode prior to bladder scanning  Problem: Patient Centered Care  Goal: Patient preferences are identified and integrated in the patient's plan of care  Description: Interventions:  - What would you like us to know as we care for you? From home with spouse  - Provide timely, complete, and accurate information to patient/family  - Incorporate patient and family knowledge, values, beliefs, and cultural backgrounds into the planning and delivery of care  - Encourage patient/family to participate in care and decision-making at the level they choose  - Honor patient and family perspectives and choices  Outcome: Progressing     Problem: Patient/Family Goals  Goal: Patient/Family Long Term Goal  Description: Patient's Long Term Goal: Discharge home     Interventions:  - monitor vs, I&o, pain per protocol   - See additional Care Plan goals for specific interventions  Outcome: Progressing  Goal: Patient/Family Short Term Goal  Description: Patient's Short Term Goal: manage risk of bleeding    Interventions:   - monitor vs, assess pain, ambulate as tolerated  - See additional Care Plan goals for specific interventions  Outcome: Progressing     Problem: RISK FOR INFECTION - ADULT  Goal: Absence of fever/infection during anticipated neutropenic period  Description: INTERVENTIONS  - Monitor WBC  - Administer growth factors as ordered  - Implement neutropenic guidelines  Outcome: Progressing     Problem: SAFETY ADULT - FALL  Goal: Free from fall injury  Description: INTERVENTIONS:  - Assess pt frequently for physical needs  - Identify cognitive and physical deficits and behaviors that affect risk of falls.  - [place] fall precautions as indicated by assessment.  - Educate pt/family on patient safety including physical limitations  - Instruct pt to call for assistance with activity based on assessment  - Modify environment to reduce risk of  injury  - Provide assistive devices as appropriate  - Consider OT/PT consult to assist with strengthening/mobility  - Encourage toileting schedule  Outcome: Progressing     Problem: DISCHARGE PLANNING  Goal: Discharge to home or other facility with appropriate resources  Description: INTERVENTIONS:  - Identify barriers to discharge w/pt and caregiver  - Include patient/family/discharge partner in discharge planning  - Arrange for needed discharge resources and transportation as appropriate  - Identify discharge learning needs (meds, wound care, etc)  - Arrange for interpreters to assist at discharge as needed  - Consider post-discharge preferences of patient/family/discharge partner  - Complete POLST form as appropriate  - Assess patient's ability to be responsible for managing their own health  - Refer to Case Management Department for coordinating discharge planning if the patient needs post-hospital services based on physician/LIP order or complex needs related to functional status, cognitive ability or social support system  Outcome: Progressing     Problem: RESPIRATORY - ADULT  Goal: Achieves optimal ventilation and oxygenation  Description: INTERVENTIONS:  - Assess for changes in respiratory status  - Assess for changes in mentation and behavior  - Position to facilitate oxygenation and minimize respiratory effort  - Oxygen supplementation based on oxygen saturation or ABGs  - Provide Smoking Cessation handout, if applicable  - Encourage broncho-pulmonary hygiene including cough, deep breathe, Incentive Spirometry  - Assess the need for suctioning and perform as needed  - Assess and instruct to report SOB or any respiratory difficulty  - Respiratory Therapy support as indicated  - Manage/alleviate anxiety  - Monitor for signs/symptoms of CO2 retention  Outcome: Progressing     Problem: HEMATOLOGIC - ADULT  Goal: Maintains hematologic stability  Description: INTERVENTIONS  - Assess for signs and symptoms of  bleeding or hemorrhage  - Monitor labs and vital signs for trends  - Administer supportive blood products/factors, fluids and medications as ordered and appropriate  - Administer supportive blood products/factors as ordered and appropriate  Outcome: Progressing  Goal: Free from bleeding injury  Description: (Example usage: patient with low platelets)  INTERVENTIONS:  - Avoid intramuscular injections, enemas and rectal medication administration  - Ensure safe mobilization of patient  - Hold pressure on venipuncture sites to achieve adequate hemostasis  - Assess for signs and symptoms of internal bleeding  - Monitor lab trends  - Patient is to report abnormal signs of bleeding to staff  - Avoid use of toothpicks and dental floss  - Use electric shaver for shaving  - Use soft bristle tooth brush  - Limit straining and forceful nose blowing  Outcome: Progressing     Problem: GASTROINTESTINAL - ADULT  Goal: Maintains or returns to baseline bowel function  Description: INTERVENTIONS:  - Assess bowel function  - Maintain adequate hydration with IV or PO as ordered and tolerated  - Evaluate effectiveness of GI medications  - Encourage mobilization and activity  - Obtain nutritional consult as needed  - Establish a toileting routine/schedule  - Consider collaborating with pharmacy to review patient's medication profile  Outcome: Progressing

## 2024-01-20 NOTE — PROGRESS NOTES
Discharge instructions reviewed with patient. Patient verbalized understanding of instructions. Tele and PIV removed. All belongings packed and taken with patient. Patient escorted to private vehicle via wheelchair.     Fanny OSWALD RN

## 2024-01-20 NOTE — PLAN OF CARE
Problem: Patient Centered Care  Goal: Patient preferences are identified and integrated in the patient's plan of care  Description: Interventions:  - What would you like us to know as we care for you? From home with spouse  - Provide timely, complete, and accurate information to patient/family  - Incorporate patient and family knowledge, values, beliefs, and cultural backgrounds into the planning and delivery of care  - Encourage patient/family to participate in care and decision-making at the level they choose  - Honor patient and family perspectives and choices  1/20/2024 1716 by Fanny Garcia RN  Outcome: Adequate for Discharge  1/20/2024 0928 by Fanny Garcia RN  Outcome: Progressing     Problem: Patient/Family Goals  Goal: Patient/Family Long Term Goal  Description: Patient's Long Term Goal: Discharge home     Interventions:  - monitor vs, I&o, pain per protocol   - See additional Care Plan goals for specific interventions  1/20/2024 1716 by Fanny Garcia RN  Outcome: Adequate for Discharge  1/20/2024 0928 by Fanny Garica RN  Outcome: Progressing  Goal: Patient/Family Short Term Goal  Description: Patient's Short Term Goal: manage risk of bleeding    Interventions:   - monitor vs, assess pain, ambulate as tolerated  - See additional Care Plan goals for specific interventions  1/20/2024 1716 by Fanny Garcia RN  Outcome: Adequate for Discharge  1/20/2024 0928 by Fanny Garcia RN  Outcome: Progressing     Problem: RISK FOR INFECTION - ADULT  Goal: Absence of fever/infection during anticipated neutropenic period  Description: INTERVENTIONS  - Monitor WBC  - Administer growth factors as ordered  - Implement neutropenic guidelines  1/20/2024 1716 by Fanny Garcia RN  Outcome: Adequate for Discharge  1/20/2024 0928 by Fanny Garcia RN  Outcome: Progressing     Problem: SAFETY ADULT - FALL  Goal: Free from fall injury  Description: INTERVENTIONS:  -  Patient discharged home. Patient educated on fluid restriction, need for follow up appointments, and where to  medications.  Patient drove self , car at emergency department parking lot.  Patient safe to drive no narcotics given.    Assess pt frequently for physical needs  - Identify cognitive and physical deficits and behaviors that affect risk of falls.  - [place] fall precautions as indicated by assessment.  - Educate pt/family on patient safety including physical limitations  - Instruct pt to call for assistance with activity based on assessment  - Modify environment to reduce risk of injury  - Provide assistive devices as appropriate  - Consider OT/PT consult to assist with strengthening/mobility  - Encourage toileting schedule  1/20/2024 1716 by Fanny Garcia RN  Outcome: Adequate for Discharge  1/20/2024 0928 by Fanny Garcia RN  Outcome: Progressing     Problem: DISCHARGE PLANNING  Goal: Discharge to home or other facility with appropriate resources  Description: INTERVENTIONS:  - Identify barriers to discharge w/pt and caregiver  - Include patient/family/discharge partner in discharge planning  - Arrange for needed discharge resources and transportation as appropriate  - Identify discharge learning needs (meds, wound care, etc)  - Arrange for interpreters to assist at discharge as needed  - Consider post-discharge preferences of patient/family/discharge partner  - Complete POLST form as appropriate  - Assess patient's ability to be responsible for managing their own health  - Refer to Case Management Department for coordinating discharge planning if the patient needs post-hospital services based on physician/LIP order or complex needs related to functional status, cognitive ability or social support system  1/20/2024 1716 by Fanny Garcia RN  Outcome: Adequate for Discharge  1/20/2024 0928 by Fanny Garcia RN  Outcome: Progressing     Problem: RESPIRATORY - ADULT  Goal: Achieves optimal ventilation and oxygenation  Description: INTERVENTIONS:  - Assess for changes in respiratory status  - Assess for changes in mentation and behavior  - Position to facilitate oxygenation and minimize respiratory  effort  - Oxygen supplementation based on oxygen saturation or ABGs  - Provide Smoking Cessation handout, if applicable  - Encourage broncho-pulmonary hygiene including cough, deep breathe, Incentive Spirometry  - Assess the need for suctioning and perform as needed  - Assess and instruct to report SOB or any respiratory difficulty  - Respiratory Therapy support as indicated  - Manage/alleviate anxiety  - Monitor for signs/symptoms of CO2 retention  1/20/2024 1716 by Fanny Garcia RN  Outcome: Adequate for Discharge  1/20/2024 0928 by Fanny Garcia RN  Outcome: Progressing     Problem: HEMATOLOGIC - ADULT  Goal: Maintains hematologic stability  Description: INTERVENTIONS  - Assess for signs and symptoms of bleeding or hemorrhage  - Monitor labs and vital signs for trends  - Administer supportive blood products/factors, fluids and medications as ordered and appropriate  - Administer supportive blood products/factors as ordered and appropriate  1/20/2024 1716 by Fanny Garcia RN  Outcome: Adequate for Discharge  1/20/2024 0928 by Fanny Garcia RN  Outcome: Progressing  Goal: Free from bleeding injury  Description: (Example usage: patient with low platelets)  INTERVENTIONS:  - Avoid intramuscular injections, enemas and rectal medication administration  - Ensure safe mobilization of patient  - Hold pressure on venipuncture sites to achieve adequate hemostasis  - Assess for signs and symptoms of internal bleeding  - Monitor lab trends  - Patient is to report abnormal signs of bleeding to staff  - Avoid use of toothpicks and dental floss  - Use electric shaver for shaving  - Use soft bristle tooth brush  - Limit straining and forceful nose blowing  1/20/2024 1716 by Fanny Garcia RN  Outcome: Adequate for Discharge  1/20/2024 0928 by Fanny Garcia RN  Outcome: Progressing     Problem: GASTROINTESTINAL - ADULT  Goal: Maintains or returns to baseline bowel  function  Description: INTERVENTIONS:  - Assess bowel function  - Maintain adequate hydration with IV or PO as ordered and tolerated  - Evaluate effectiveness of GI medications  - Encourage mobilization and activity  - Obtain nutritional consult as needed  - Establish a toileting routine/schedule  - Consider collaborating with pharmacy to review patient's medication profile  1/20/2024 1716 by Fanny Garcia, RN  Outcome: Adequate for Discharge  1/20/2024 0928 by Fanny Garcia, RN  Outcome: Progressing

## 2024-01-22 ENCOUNTER — PATIENT OUTREACH (OUTPATIENT)
Dept: CASE MANAGEMENT | Age: 79
End: 2024-01-22

## 2024-01-22 ENCOUNTER — TELEPHONE (OUTPATIENT)
Facility: CLINIC | Age: 79
End: 2024-01-22

## 2024-01-22 NOTE — TELEPHONE ENCOUNTER
Nursing: Please contact patient and have her set up discharge clinic follow up with myself of [facility] and let her know her EGD biopsies were negative for malignancy or infection.     Thanks!  Tiffany

## 2024-01-22 NOTE — PROGRESS NOTES
Left message on mailbox for pt to call NCM back for TCM.  [facility] contact information [phone] and [facility] #[phone] included in message.          Discharge Dx:     GI bleed   1/17/2024 Procedure: EGD w/biopsy & Colonoscopy w/control of bleeding   Impression:  Mild gastric erythema, biopsied  Single small transverse colon AVM s/p APC    Follow up appointments:    Start   Ordered   01/20/24 1101  Follow up with Heart Failure Clinic  (Post-Discharge Follow-Up Orders)  Once,   Status:  Canceled        Priority: Routine   Question Answer Comment   When should patient follow up? Within 7 days    Where to follow-up? [facility]        01/20/24 1100         Follow-up Information    Follow up With Specialties Details Why Contact Info Additional Information   Twin Boles MD Interventional, Cardiology, CARDIOLOGY Follow up Follow up with Dr. Boles and Dr. Sargent [address]  [facility] [id number] [phone]    [facility] Cardiology   [address]  [facility] [id number] [phone] Your appointment is located at [address]  in [place], [place].  Please park in the Yellow lot and go through the [place] [facility] entrance.  Then proceed to [address]. Masks are optional for all patients and visitors, unless otherwise indicated.   Johnathon Rodriguez, DO Family Medicine Follow up  [address]  [address]  [facility] [address] [phone]    Zoraida Rios MD GASTROENTEROLOGY Follow up  [address]  [facility] [address] [phone]    Ayush Key MD NEPHROLOGY, Internal Medicine Schedule an appointment as soon as possible for a visit in 2 week(s)  [address] [facility] [address] [phone]

## 2024-01-22 NOTE — TELEPHONE ENCOUNTER
Left message to call back.    CSS:  Please offer open res24 appointment with Delisa URBANO when she calls back.  Thank you.

## 2024-01-22 NOTE — TELEPHONE ENCOUNTER
----- Message from Zoraida Douglass Ma, MD sent at 1/20/2024  9:03 AM CST -----  Tiffany, seen inpatient, please let her know that gastric bx unremarkable. Follow-up gi clinic as needed

## 2024-01-22 NOTE — TELEPHONE ENCOUNTER
Tiffany    Your first discharge clinic opening isn't until March.  Is that ok?  Delisa does not have discharge clinic just res24.    Please let me know where ok to add.    Thank you

## 2024-01-23 ENCOUNTER — TELEPHONE (OUTPATIENT)
Dept: FAMILY MEDICINE CLINIC | Facility: CLINIC | Age: 79
End: 2024-01-23

## 2024-01-23 ENCOUNTER — PATIENT OUTREACH (OUTPATIENT)
Dept: CASE MANAGEMENT | Age: 79
End: 2024-01-23

## 2024-01-23 DIAGNOSIS — R06.00 DYSPNEA, UNSPECIFIED TYPE: ICD-10-CM

## 2024-01-23 DIAGNOSIS — D68.9 COAGULOPATHY (HCC): ICD-10-CM

## 2024-01-23 DIAGNOSIS — Z02.9 ENCOUNTERS FOR UNSPECIFIED ADMINISTRATIVE PURPOSE: Primary | ICD-10-CM

## 2024-01-23 DIAGNOSIS — D64.9 ANEMIA, UNSPECIFIED TYPE: Primary | ICD-10-CM

## 2024-01-23 DIAGNOSIS — K92.1 MELENA: ICD-10-CM

## 2024-01-23 DIAGNOSIS — R65.10 SIRS (SYSTEMIC INFLAMMATORY RESPONSE SYNDROME) (HCC): ICD-10-CM

## 2024-01-23 DIAGNOSIS — E87.20 LACTIC ACIDOSIS: ICD-10-CM

## 2024-01-23 DIAGNOSIS — Z79.01 ANTICOAGULATED: ICD-10-CM

## 2024-01-23 PROCEDURE — 1159F MED LIST DOCD IN RCRD: CPT

## 2024-01-23 PROCEDURE — 1111F DSCHRG MED/CURRENT MED MERGE: CPT

## 2024-01-23 NOTE — PROGRESS NOTES
Initial Post Discharge Follow Up   Discharge Date: 1/20/24  Contact Date: 1/23/2024     Consent Verification:  Assessment Completed With: Patient  HIPAA Verified?  Yes     Discharge Dx:   Melena [K92.1]  Lactic acidosis [E87.20]  Coagulopathy (HCC) [D68.9]  Anticoagulated [Z79.01]  SIRS (systemic inflammatory response syndrome) (HCC) [R65.10]  Hypothermia, initial encounter [T68.XXXA]  Dyspnea, unspecified type [R06.00]  Last Hgb on 1/20/24 was 7.8     General:   How have you been since your discharge from the hospital? Patient states that she is feeling short of breath with activity. Patient denies fever/chills, no dizziness/lightheadedness, states she has an occasional dry cough, denies chest pain, no pain radiating from chest to neck, jaw, shoulders, arms or upper back, no abdominal pain, no nausea/vomiting. Patient denies any BRBPR, and no dark tarry stools. Last Hgb was 7.8 on 1/20/24, NCM sent condition update to MD's office. Patient reports she still has swelling in her feet and legs. Per AVS patient is to be taking Lasix 40 mg tablet, take 1 tablet (40 mg total) by mouth daily, patient states she thinks she was told to take (one 20 mg tab daily), but she is not sure which doctor advised her to do this. NCM reviewed discharge instructions and instructed patient not to take any aspirin, or NSAID's at this time as they could increase the risk of bleeding. Per AVS patient was instructed to stop the Eliquis right now. NCM instructed patient to change positions frequently, walk as tolerated, rest when needed, stay hydrated and continue to take up to ten deep breaths an hour while awake, or if watching television take a deep breath with every commercial. NCM reviewed discharge instructions, medications, S&S of infection/blood clots with patient, she verbalized understanding of these. Patient denies any further questions or needs at this time.  Do you have any pain since discharge?  No    When you were leaving the  hospital were your discharge instructions reviewed with you? No, BRIGIDA reviewed discharge instructions with patient, she verbalized understanding of these and no further questions at this time.    Do you have any questions about your discharge instructions?  No  Before leaving the hospital was your diagnoses explained to you? Yes  Do you have any questions about your diagnoses? No  Are you able to perform normal daily activities of living as you have prior to your hospital stay (dressing, bathing, ambulating to the bathroom, etc)? no  If No, What are some barriers or concerns?  Patient states she needs help with some of her ADL's and her  is helping her.  (NCM) Was patient given a different diet per AVS? yes  If so, which diet?  Soft/low fiber  Are there any barriers to following this diet? no     Medications:   STOP taking:  apixaban 5 MG Tabs (Eliquis)  pregabalin 75 MG Caps (Lyrica)  Review your updated medication list below.         Current Outpatient Medications   Medication Sig Dispense Refill    HYDROcodone-acetaminophen (NORCO)  MG Oral Tab Take 1 tablet by mouth every 6 (six) hours as needed for Pain. 120 tablet 0    pantoprazole 40 MG Oral Tab EC Take 1 tablet (40 mg total) by mouth 2 (two) times daily before meals. 60 tablet 0    furosemide 40 MG Oral Tab Take 1 tablet (40 mg total) by mouth daily. 30 tablet 0    AMITRIPTYLINE 25 MG Oral Tab TAKE 1 TABLET(25 MG) BY MOUTH EVERY NIGHT 30 tablet 0    methotrexate 2.5 MG Oral Tab TAKE 6 TABLETS BY MOUTH 1 TIME A WEEK 77 tablet 0    alendronate 70 MG Oral Tab Take 1 tablet (70 mg total) by mouth once a week. 12 tablet 0    ENTRESTO 24-26 MG Oral Tab Take 1 tablet by mouth 2 (two) times daily. 180 tablet 0    folic acid 800 MCG Oral Tab Take 1 tablet (800 mcg total) by mouth daily. 90 tablet 1    ferrous sulfate 325 (65 FE) MG Oral Tab EC Take 1 tablet (325 mg total) by mouth daily with breakfast.        carvedilol 25 MG Oral Tab Take 1 tablet (25 mg  total) by mouth 2 (two) times daily. 60 tablet 0      Were there any changes to your current medication(s) noted on the AVS? Yes  If so, were these medication changes discussed with you prior to leaving the hospital? Yes  If a new medication was prescribed:    Was the new medication's purpose & side effects reviewed? Yes  Do you have any questions about your new medication? No  Did you  your discharge medications when you left the hospital? Yes  Let's go over your medications together to make sure we are not missing anything. Medications Reviewed  Are there any reasons that keep you from taking your medication as prescribed? No  Are you having any concerns with constipation? No  Did patient receive their flu shot (Sept-March)? No     Discharge medications reviewed/discussed/and reconciled against outpatient medications with patient.  Any changes or updates to medications sent to PCP.  Patient Acknowledged                  Referrals/orders at D/C:  Referrals/orders placed at D/C? no     DME ordered at D/C? No     Discharge orders, AVS reviewed and discussed with patient. Any changes or updates to orders sent to PCP.  Patient Acknowledged        SDOH:   Transportation:         Transportation Needs: No Transportation Needs (1/13/2024)     Transportation Needs      Lack of Transportation: No         Financial Strain:         Financial Resource Strain: Low Risk  (1/23/2024)     Financial Resource Strain      Difficulty of Paying Living Expenses: Not on file      Med Affordability: No                       Diagnosis specifics:   Upper GI bleeding, stable  Call your healthcare provider right away for any of the following:  Stomach pain starts or gets worse  Pain spreads to the neck, back, shoulder, or arm  Weakness or dizziness  Swelling of your belly  Red blood in your stool  Fever of 100.4°F (38°C) or higher, or as directed by your healthcare provider  New symptoms     Call 911 if any of these occur:  Trouble  breathing or swallowing  Severe dizziness  Loss of consciousness  Vomiting blood that is not related to a bloody nose or a dental procedure  Large amounts of blood in the stool  Black, tarry stool  Chest pain or lightheadedness     Follow up appointments:       Your appointments         Date & Time Appointment Department (Center)     Feb 05, 2024  1:00 PM [facility] with Maritza Rivera APRN [facility] ([facility])           Feb 06, 2024 12:00 PM CST Video Visit with Felicitas Lee DO [facility] ([facility])     Please verify your telehealth insurance benefits prior to your appointment.     You must be in the [facility] during the virtual visit.     Please use the [employer] and launch the video visit 10 minutes prior to your scheduled appointment time to ensure your camera and microphone are working properly. Once the video visit has started you will be placed in a waiting room until the provider begins the visit.     You will receive an email confirmation with instructions.  If you have questions, call your doctor's office directly.     If you are having issues or need to use a desktop/laptop, please follow the below steps:        1.       Close out all other open apps (could be competing for audio resources)  2.       Disable Bluetooth  3.       Reboot mobile device before joining the video  4.       Come off Wi-Fi and switch over to Data     Please see our Video Visit Tip Sheet if you need additional assistance.      If you believe this is an emergency, please dial 911 immediately.                       [facility]  [address]  [facility] [address]  [phone] [facility]  [address]  [facility] [address]  [phone]             TCC  Was TCC ordered: No        PCP (If no TCC appointment)  Does patient already have a PCP appointment scheduled? No, [facility] placed a call to MD's office and spoke with Angy, Lead PSR who was able to schedule a TCM appointment on 1/26/2024 at 11:30 am. [facility] has attempted to reach patient, had to leave a VM for patient and one for her spouse requesting a call back.  [facility] has made multiple calls patient's phone goes right to Voice mail, left VM requesting a return call and also provided date and time of TCM appointment. NCM will notify MD's office.     Specialist                             Does the patient have any other follow up appointment(s) needing to be scheduled? No  Does the patient need assistance scheduling appointment(s): No, message previously sent.  Patient has a HFU on 1/29/2024 with Rogelio Caballero in cardiology.  Patient has a HFU on 2/5/24 with Maritza URBANO in [facility] HF clinic.      Is there any reason as to why you cannot make your appointment(s)?  No                   Needs post D/C:   Now that you are home, are there any needs or concerns you need addressed before your next visit with your PCP?  (DME, meds, questions, etc.): No     Interventions by [facility]:   NC reviewed medications, discharge instructions, S&S of infection/blood clots. Patient instructed to report any new or worsening symptoms, when to call the doctor and when to call 911. Patient verbalized understanding of these. NCM instructed patient to call PCP with any questions or needs, she states she will.  Last Hgb was 7.8 on 1/20/24, NCM sent condition update, with request for H&H or CBC, to MD's office, for MD to advise.        CCM referral placed:    Yes     BOOK BY DATE: 2/3/2024

## 2024-01-23 NOTE — TELEPHONE ENCOUNTER
Left message on mailbox for pt to call NCM back for TCM--second attempt since hospital discharge 1/20/2024. Patient does not have an appointment scheduled at this time.  TCM appointment recommended by 1/27/2024, as patient is a High risk for readmission.  Please advise.    BOOK BY DATE (last date for TCM): 2/03/2024    Please follow-up with patient and try to get them to schedule as patient would greatly benefit from TCM appointment.  Thank you!           Follow up appointments:    Start     Ordered   01/20/24 1101  Follow up with Heart Failure Clinic  (Post-Discharge Follow-Up Orders)  Once,   Status:  Canceled        Priority: Routine   Question Answer Comment   When should patient follow up? Within 7 days     Where to follow-up? [facility]         01/20/24 1100         Follow-up Information     Follow up With Specialties Details Why Contact Info Additional Information   Twin Boles MD Interventional, Cardiology, CARDIOLOGY Follow up Follow up with Dr. Boles and Dr. Sargent [address]  [facility] [id number] [phone]     [facility] Cardiology     [address]  [facility] [id number] [phone] Your appointment is located at [address] in [place], [place].  Please park in the Yellow lot and go through the [facility] entrance.  Then proceed to [address]. Masks are optional for all patients and visitors, unless otherwise indicated.   Johnathon Rodriguez, DO Family Medicine Follow up   [address]  [address]  [facility] [address] [phone]     Zoraida Rios MD GASTROENTEROLOGY Follow up   [address]  [facility] [address] [phone]     Ayush Key MD NEPHROLOGY, Internal Medicine Schedule an appointment as soon as possible for a visit in 2 week(s)   [address]  [facility] [address] [phone]

## 2024-01-23 NOTE — TELEPHONE ENCOUNTER
Condition update: While on TCM call, Patient states that she is feeling short of breath with activity. Patient denies fever/chills, no dizziness/lightheadedness, states she has an occasional dry cough, denies chest pain, no pain radiating from chest to neck, jaw, shoulders, arms or upper back, no abdominal pain, no nausea/vomiting. Patient denies any BRBPR, and no dark tarry stools. Last Hgb was 7.8 on 1/20/24, [facility] sent condition update to MD's office. Patient reports she still has swelling in her feet and legs. Per AVS patient is to be taking Lasix 40 mg tablet, take 1 tablet (40 mg total) by mouth daily, patient states she thinks she was told to take (one 20 mg tab daily), but she is not sure which doctor advised her to do this.    Clinical Staff: Please notify Dr. Rodriguez of the above and ask if it would be possible to put an order in for an H&H or CBC to check patient's Hgb. Then please call the patient with any further instructions.    Thank you!

## 2024-01-23 NOTE — TELEPHONE ENCOUNTER
Order for CBC has been placed on the chart.  It is difficult to determine exactly the status of this patient.  If in general she is not feeling any better than when she was admitted, then she needs an emergency room reassessment.  She is high risk for readmission.  If her status has not improved with regards to preadmission and during admission, then I advised ER.  Patient needs pulse oximeter on room air.  If the patient is anemic with a hemoglobin of 7.8 and she was treated for a systemic inflammatory response syndrome, again this puts her at high risk for readmission.

## 2024-01-23 NOTE — PROGRESS NOTES
TCM has attempted to contact patient with no response however, patient needs additional appointments.  Please contact patient for assistance with scheduling a follow-up appointment for Cardiology and HF Clinic .  Should you reach patient please notify TCM RN ([phone]) so they can speak with patient.  Thank you!         Follow up appointments:    Start     Ordered   01/20/24 1101  Follow up with Heart Failure Clinic  (Post-Discharge Follow-Up Orders)  Once,   Status:  Canceled        Priority: Routine   Question Answer Comment   When should patient follow up? Within 7 days     Where to follow-up? [facility]         01/20/24 1100         Follow-up Information     Follow up With Specialties Details Why Contact Info Additional Information   Twin Boles MD Interventional, Cardiology, CARDIOLOGY Follow up Follow up with Dr. Boles and Dr. Sargent [address]  [facility] [address] [phone]     [facility] Cardiology     [address]  [facility] [address] [phone] Your appointment is located at [address]  in [place], [place].  Please park in the Yellow lot and go through the [facility] [facility] entrance.  Then proceed to [address]. Masks are optional for all patients and visitors, unless otherwise indicated.   Johnathon Rodriguez, DO Family Medicine Follow up   [address]  [address]  [facility] [address] [phone]     Zoraida Rios MD GASTROENTEROLOGY Follow up   [address]  [facility] [address] [phone]     Ayush Key MD NEPHROLOGY, Internal Medicine Schedule an appointment as soon as possible for a visit in 2 week(s)   [address]  [facility] [address] [phone]

## 2024-01-23 NOTE — PROGRESS NOTES
Initial Post Discharge Follow Up   Discharge Date: 1/20/24  Contact Date: 1/23/2024    Consent Verification:  Assessment Completed With: Patient  HIPAA Verified?  Yes    Discharge Dx:   Melena [K92.1]  Lactic acidosis [E87.20]  Coagulopathy (HCC) [D68.9]  Anticoagulated [Z79.01]  SIRS (systemic inflammatory response syndrome) (HCC) [R65.10]  Hypothermia, initial encounter [T68.XXXA]  Dyspnea, unspecified type [R06.00]  Last Hgb on 1/20/24 was 7.8    General:   How have you been since your discharge from the hospital? Patient states that she is feeling short of breath with activity. Patient denies fever/chills, no dizziness/lightheadedness, states she has an occasional dry cough, denies chest pain, no pain radiating from chest to neck, jaw, shoulders, arms or upper back, no abdominal pain, no nausea/vomiting. Patient denies any BRBPR, and no dark tarry stools. Last Hgb was 7.8 on 1/20/24, NCM sent condition update to MD's office. Patient reports she still has swelling in her feet and legs. Per AVS patient is to be taking Lasix 40 mg tablet, take 1 tablet (40 mg total) by mouth daily, patient states she thinks she was told to take (one 20 mg tab daily), but she is not sure which doctor advised her to do this. NCM reviewed discharge instructions and instructed patient not to take any aspirin, or NSAID's at this time as they could increase the risk of bleeding. Per AVS patient was instructed to stop the Eliquis right now. NCM instructed patient to change positions frequently, walk as tolerated, rest when needed, stay hydrated and continue to take up to ten deep breaths an hour while awake, or if watching television take a deep breath with every commercial. NCM reviewed discharge instructions, medications, S&S of infection/blood clots with patient, she verbalized understanding of these. Patient denies any further questions or needs at this time.  Do you have any pain since discharge?  No    When you were leaving the  hospital were your discharge instructions reviewed with you? No, BRIGIDA reviewed discharge instructions with patient, she verbalized understanding of these and no further questions at this time.    Do you have any questions about your discharge instructions?  No  Before leaving the hospital was your diagnoses explained to you? Yes  Do you have any questions about your diagnoses? No  Are you able to perform normal daily activities of living as you have prior to your hospital stay (dressing, bathing, ambulating to the bathroom, etc)? no  If No, What are some barriers or concerns?  Patient states she needs help with some of her ADL's and her  is helping her.  (NCM) Was patient given a different diet per AVS? yes  If so, which diet?  Soft/low fiber  Are there any barriers to following this diet? no    Medications:   STOP taking:  apixaban 5 MG Tabs (Eliquis)  pregabalin 75 MG Caps (Lyrica)  Review your updated medication list below.  Current Outpatient Medications   Medication Sig Dispense Refill    HYDROcodone-acetaminophen (NORCO)  MG Oral Tab Take 1 tablet by mouth every 6 (six) hours as needed for Pain. 120 tablet 0    pantoprazole 40 MG Oral Tab EC Take 1 tablet (40 mg total) by mouth 2 (two) times daily before meals. 60 tablet 0    furosemide 40 MG Oral Tab Take 1 tablet (40 mg total) by mouth daily. 30 tablet 0    AMITRIPTYLINE 25 MG Oral Tab TAKE 1 TABLET(25 MG) BY MOUTH EVERY NIGHT 30 tablet 0    methotrexate 2.5 MG Oral Tab TAKE 6 TABLETS BY MOUTH 1 TIME A WEEK 77 tablet 0    alendronate 70 MG Oral Tab Take 1 tablet (70 mg total) by mouth once a week. 12 tablet 0    ENTRESTO 24-26 MG Oral Tab Take 1 tablet by mouth 2 (two) times daily. 180 tablet 0    folic acid 800 MCG Oral Tab Take 1 tablet (800 mcg total) by mouth daily. 90 tablet 1    ferrous sulfate 325 (65 FE) MG Oral Tab EC Take 1 tablet (325 mg total) by mouth daily with breakfast.      carvedilol 25 MG Oral Tab Take 1 tablet (25 mg total) by  mouth 2 (two) times daily. 60 tablet 0     Were there any changes to your current medication(s) noted on the AVS? Yes  If so, were these medication changes discussed with you prior to leaving the hospital? Yes  If a new medication was prescribed:    Was the new medication's purpose & side effects reviewed? Yes  Do you have any questions about your new medication? No  Did you  your discharge medications when you left the hospital? Yes  Let's go over your medications together to make sure we are not missing anything. Medications Reviewed  Are there any reasons that keep you from taking your medication as prescribed? No  Are you having any concerns with constipation? No  Did patient receive their flu shot (Sept-March)? No    Discharge medications reviewed/discussed/and reconciled against outpatient medications with patient.  Any changes or updates to medications sent to PCP.  Patient Acknowledged     Referrals/orders at D/C:  Referrals/orders placed at D/C? no    DME ordered at D/C? No    Discharge orders, AVS reviewed and discussed with patient. Any changes or updates to orders sent to PCP.  Patient Acknowledged      SDOH:   Transportation:    Transportation Needs: No Transportation Needs (1/13/2024)    Transportation Needs     Lack of Transportation: No       Financial Strain:    Financial Resource Strain: Low Risk  (1/23/2024)    Financial Resource Strain     Difficulty of Paying Living Expenses: Not on file     Med Affordability: No        Diagnosis specifics:   Upper GI bleeding, stable  Call your healthcare provider right away for any of the following:  Stomach pain starts or gets worse  Pain spreads to the neck, back, shoulder, or arm  Weakness or dizziness  Swelling of your belly  Red blood in your stool  Fever of 100.4°F (38°C) or higher, or as directed by your healthcare provider  New symptoms    Call 911 if any of these occur:  Trouble breathing or swallowing  Severe dizziness  Loss of  consciousness  Vomiting blood that is not related to a bloody nose or a dental procedure  Large amounts of blood in the stool  Black, tarry stool  Chest pain or lightheadedness    Follow up appointments:      Your appointments       Date & Time Appointment Department (Center)    Feb 05, 2024  1:00 PM CST [facility] with Maritza Rivera APRN [facility] ([facility])        Feb 06, 2024 12:00 PM CST Video Visit with Felicitas Lee DO [facility] ([facility])    Please verify your telehealth insurance benefits prior to your appointment.    You must be in the [facility] during the virtual visit.     Please use the [employer] and launch the video visit 10 minutes prior to your scheduled appointment time to ensure your camera and microphone are working properly. Once the video visit has started you will be placed in a waiting room until the provider begins the visit.     You will receive an email confirmation with instructions.  If you have questions, call your doctor's office directly.    If you are having issues or need to use a desktop/laptop, please follow the below steps:        1.       Close out all other open apps (could be competing for audio resources)  2.       Disable Bluetooth  3.       Reboot mobile device before joining the video  4.       Come off Wi-Fi and switch over to Data    Please see our Video Visit Tip Sheet if you need additional assistance.     If you believe this is an emergency, please dial 911 immediately.                [facility]  [address]  [facility] [address]  [phone] [facility]  [address]  [facility] [address]  [phone]          TCC  Was TCC ordered: No      PCP  (If no TCC appointment)  Does patient already have a PCP appointment scheduled? No, [facility] placed a call to MD's office and spoke with Angy, Lead PSR who was able to schedule a TCM appointment on 1/26/2024 at 11:30 am. [facility] has attempted to reach patient, had to leave a VM for patient and one for her spouse requesting a call back.  [facility] has made multiple calls patient's phone goes right to Voice mail, left VM requesting a return call and also provided date and time of TCM appointment. [facility] will notify MD's office.    Specialist    Does the patient have any other follow up appointment(s) needing to be scheduled? No  Does the patient need assistance scheduling appointment(s): No, message previously sent.  Patient has a HFU on 1/29/2024 with Rogelio Boles and Arie in cardiology.  Patient has a HFU on 2/5/24 with Maritza URBANO in [facility] HF clinic.     Is there any reason as to why you cannot make your appointment(s)?  No     Needs post D/C:   Now that you are home, are there any needs or concerns you need addressed before your next visit with your PCP?  (DME, meds, questions, etc.): No    Interventions by [facility]:   [facility] reviewed medications, discharge instructions, S&S of infection/blood clots. Patient instructed to report any new or worsening symptoms, when to call the doctor and when to call 911. Patient verbalized understanding of these. NCM instructed patient to call PCP with any questions or needs, she states she will.  Last Hgb was 7.8 on 1/20/24, [facility] sent condition update, with request for H&H or CBC, to MD's office, for MD to advise.      CCM referral placed:    Yes    BOOK BY DATE: 2/3/2024

## 2024-01-23 NOTE — PROGRESS NOTES
TCM request    [facility] apt request (discharged 01/20)    [facility]  [address]  [address]  [facility] [address]  [phone]  Apt made:  Mon 02/05 @1:00pm    Dr Twin Boles  Interventional, Cardiology, CARDIOLOGY  [facility]  [address]  [address]  [facility] [address]  [phone]  Follow up w/Dr Boles and Dr Sargent  Pt advised she has her apt Mon 01/29  Confirmed w/pt  Transferred pt to [facility]  Closing encounter

## 2024-01-23 NOTE — PROGRESS NOTES
Left message on mailbox for pt to call [facility] back for TCM.  [facility] contact information [phone] and [facility] clinic # [phone] included in message. Sent TE to office staff for appt f/u, as pt high risk for readmission.

## 2024-01-24 NOTE — PAYOR COMM NOTE
--------------  DISCHARGE REVIEW    Payor: [employer]  Subscriber #:  [id number]  Authorization Number: [id number]    Admit date: 24  Admit time:  12:44 AM  Discharge Date: 2024  5:20 PM     Admitting Physician: Rgean Cruz MD  Attending Physician:  No att. providers found  Primary Care Physician: Johnathon Rodriguez DO          Discharge Summary Notes        Discharge Summary signed by Katiana Ochoa MD at 2024  1:52 PM       Author: Katiana Ochoa MD Specialty: HOSPITALIST, Internal Medicine Author Type: Physician    Filed: 2024  1:52 PM Date of Service: 2024  1:48 PM Status: Signed    : Katiana Ochoa MD (Physician)           [facility]    Discharge Summary    Margaret Silverio Patient Status:  Inpatient    3/14/1945 MRN [id number]   Location [facility] 3W/SW Attending Katiana Ochoa MD   Hosp Day # 7 PCP Johnathon Rodriguez DO     Date of Admission: 2024      Date of Discharge: 24      Admitting Diagnosis: Melena [K92.1]  Lactic acidosis [E87.20]  Coagulopathy (HCC) [D68.9]  Anticoagulated [Z79.01]  SIRS (systemic inflammatory response syndrome) (HCC) [R65.10]  Hypothermia, initial encounter [T68.XXXA]  Dyspnea, unspecified type [R06.00]    Hospital Discharge Diagnoses:  GI bleed    Lace+ Score: 77  59-90 High Risk  29-58 Medium Risk  0-28   Low Risk.    TCM Follow-Up Recommendation:  LACE > 58: High Risk of readmission after discharge from the hospital.          Problem List:   Patient Active Problem List   Diagnosis    Altered mental status    Altered mental status, unspecified altered mental status type    Seizure (HCC)    Lactic acidosis    Leukocytosis    Acute GI bleeding    GEORGE (acute kidney injury) (HCC)    Thrombocytopenia (HCC)    Metabolic acidosis    Atrial fibrillation, chronic (HCC)    Sepsis due to urinary tract infection  (HCC)    Sepsis due to Escherichia coli (HCC)    ABLA  (acute blood loss anemia)    Melena    Atrial fibrillation with rapid ventricular response (HCC)    Acute chest pain    Acute on chronic congestive heart failure, unspecified heart failure type (HCC)    Pleural effusion    ACC/AHA stage B systolic heart failure (HCC)    Congestive heart failure (HCC)    Coronary arteriosclerosis    Essential (primary) hypertension    Mitral valve stenosis    Rheumatoid arthritis (HCC)    Stage 3 chronic kidney disease (HCC)    Atrial flutter (HCC)    Acute on chronic HFrEF (heart failure with reduced ejection fraction) (HCC)    Dyspnea, unspecified type    Hypothermia, initial encounter    Anticoagulated    Coagulopathy (HCC)    SIRS (systemic inflammatory response syndrome) (HCC)    Anemia due to stage 3 chronic kidney disease  (HCC)    Anemia due to GI blood loss    Anemia of chronic disease    Lymphopenia         Physical Exam:     Gen: No acute distress  Pulm: Lungs clear, normal respiratory effort  CV: Heart with regular rate and rhythm  Abd: Abdomen soft, nontender, nondistended, bowel sounds present  Neuro: No acute focal deficits  MSK: Full range of motion in extremities  Skin: Warm and dry  Psych: Normal affect  Ext: no c/c/e      History of Present Illness: Per admit md Margaret Fuller Jean Claude is a(n) 78 year old female, with a past medical history significant for atrial fibrillation, currently on Eliquis, hypertension, chronic systolic heart failure and status post pacemaker placement earlier in September was brought to the ER with increasing shortness of breath fatigue and melanotic stools over the last week.  Some mention of abdominal pain earlier in the day.  Patient currently on BiPAP, difficult to understand however denying any pain, claims her breathing is much improved.  No nausea vomiting or chest pain    Hospital Course:     Acute upper GI bleed   Acute blood loss anemia  Chronic anemia   - Patient with 1 week of diarrhea and melanotic stools PTA  - Hb 7.1 lowest.  Trending up now   - GI on consult.   - hold Eliquis.   - transfuse for Hb < 7.0 or PLT < 10K or < 50K with active bleeding   - s/p FFP 1 unit and 1 unit PRBC.   - h/o EGD 04/23' with AVM s/p hemoclip   - EGD/CLN done 1/17 and colon AVM s/p APC  - Now tolerating diet     Acute hypoxemic respiratory failure   - secondary to CHF and RLL PNA   - off NIPPV. Off o2 NC   - Pulmonary on consult.   - Lasix 20mg IV BID - changed to daily  - IV zosyn empirically   - blood cx 2/2 NGTD. Genexpert neg,      Acute pancytopenia   - Likely related to severe sepsis on admit.   - retic ct 13, B12 ok, folate pending. Hemolysis labs negative.   - HONC on consult - fu as outpt     Severe sepsis   - secondary to PNA   - Lactic acid 12 on admit   - blood cx x 2 neg   - CXR possible RLL PNA   - IV zosyn   - CT chest small loculated R and small L pleural effusion no consolidation. CT abd pelvis chronic pancreatitis   - Ucx negative      Acute on Chronic combined CHF   Severe Tricuspid valve regurgitation  Mitral valve bioprosthesis  NICM s/p ICD 9/2023  - ECHO from 10/2023 with LVEF 37% grade 4 TR.   - Cards consult.   - Lasix 20mg IV BID  --> changed to oral daily  - coreg 25mg BID   - Entresto   - daily weights , I/O      Paroxysmal A.fib   - hold home Eiquis   - rate controlled.   - consider eventual outpt watchman with recurrent GI bleed     Acute kidney injury on CKD III  - Improved but worsening again - will get renal consult, appreciate recs  - Possible ATN from hypoperfusion/anemia vs sepsis  - baseline creat 1.3 per EMR   - Monitor UOP   - UA with hyaline casts indicative of decreased renal perfusion      LUE edema  - elevate. Hot packs   - US negative for DVT.   - IV infiltrated overnight 1/13      Other medical problems  H/o RA  H/o small bowel AVM's    Discharge Condition: Stable    Discharge Medications:      Discharge Medications        CHANGE how you take these medications        Instructions Prescription details   furosemide  40 MG Tabs  Commonly known as: Lasix  Start taking on: January 21, 2024  What changed:   medication strength  how much to take      Take 1 tablet (40 mg total) by mouth daily.   Quantity: 30 tablet  Refills: 0     HYDROcodone-acetaminophen  MG Tabs  Commonly known as: De Beque  What changed: Another medication with the same name was removed. Continue taking this medication, and follow the directions you see here.      Take 1 tablet by mouth every 6 (six) hours as needed for Pain.   Quantity: 120 tablet  Refills: 0     pantoprazole 40 MG Tbec  Commonly known as: Protonix  What changed: when to take this      Take 1 tablet (40 mg total) by mouth 2 (two) times daily before meals.   Quantity: 60 tablet  Refills: 0            CONTINUE taking these medications        Instructions Prescription details   alendronate 70 MG Tabs  Commonly known as: Fosamax      Take 1 tablet (70 mg total) by mouth once a week.   Quantity: 12 tablet  Refills: 0     amitriptyline 25 MG Tabs  Commonly known as: Elavil      TAKE 1 TABLET(25 MG) BY MOUTH EVERY NIGHT   Quantity: 30 tablet  Refills: 0     carvedilol 25 MG Tabs  Commonly known as: Coreg      Take 1 tablet (25 mg total) by mouth 2 (two) times daily.   Quantity: 60 tablet  Refills: 0     Entresto 24-26 MG Tabs  Generic drug: sacubitril-valsartan      Take 1 tablet by mouth 2 (two) times daily.   Quantity: 180 tablet  Refills: 0     ferrous sulfate 325 (65 FE) MG Tbec      Take 1 tablet (325 mg total) by mouth daily with breakfast.   Refills: 0     folic acid 800 MCG Tabs  Commonly known as: FOLVITE      Take 1 tablet (800 mcg total) by mouth daily.   Quantity: 90 tablet  Refills: 1     methotrexate 2.5 MG Tabs  Commonly known as: Rheumatrex      TAKE 6 TABLETS BY MOUTH 1 TIME A WEEK   Quantity: 77 tablet  Refills: 0            STOP taking these medications      apixaban 5 MG Tabs  Commonly known as: Eliquis        pregabalin 75 MG Caps  Commonly known as: Lyrica                   Where to Get Your Medications        These medications were sent to [id number] - [place], [place] - [address] AT [facility] [address], [phone], [phone] [address], [facility] [address]      Phone: [phone]   furosemide 40 MG Tabs  HYDROcodone-acetaminophen  MG Tabs  pantoprazole 40 MG Tbec             Katiana Ochoa MD  1/20/2024  1:49 PM    Greater than 30 minutes spent on preparation and coordination of this discharge      Electronically signed by Katiana Ochoa MD on 1/20/2024  1:52 PM         REVIEWER COMMENTS

## 2024-01-24 NOTE — TELEPHONE ENCOUNTER
Left message to call back.    CSS:  Patient needs a hospital follow up appointment.  Please offer res24 with Delisa MARIEE when she calls back.

## 2024-01-25 NOTE — TELEPHONE ENCOUNTER
Reached patient on husbands line, patient states she is not sure if she is feeling much better, just woke up, states she needs to get up and see how she is doing. RN advised I would call back shortly to check in on her. RN advised of CBC request. Also advised if not better then patient needs to ER. Patient verbalized understanding.

## 2024-01-25 NOTE — TELEPHONE ENCOUNTER
Left message to call back.    Our office tried to contact patient on three separate occasions with no answer or call back.  No response letter mailed to home address.  Encounter closed per protocol.    CSS:  Patient needs a hospital follow up appointment.  Please offer res24 with Delisa MARIEE when she calls back.

## 2024-01-25 NOTE — TELEPHONE ENCOUNTER
answered and states he has left the home and to call patient at home number.     Left message for patient to call back and discuss.

## 2024-01-25 NOTE — TELEPHONE ENCOUNTER
Please follow up with patient if she does not attend visit as scheduled     Future Appointments   Date Time Provider Department Center   1/26/2024 11:40 AM [facility]   2/5/2024  1:00 PM Maritza Rivera APRN [facility]   2/6/2024 12:00 PM Felicitas Lee DO PM&R [facility]

## 2024-01-26 NOTE — TELEPHONE ENCOUNTER
CBC not done yet .   RN called and left a complete message to patient's voice mail to call us back for symptoms update as she missed her  appointment earlier today or check her MyChart .   MyChart message sent .

## 2024-01-26 NOTE — TELEPHONE ENCOUNTER
At this moment, there is no office visit note. The appointment desk note does not show if the patient has already arrived or canceled or no  show.    NO ED encounter, nothing under [facility].    Will check it again later .   .     Future Appointments   Date Time Provider Department Center   2/5/2024  1:00 PM Maritza Rievra APRN [facility] SPCIALTY [facility]   2/6/2024 12:00 PM Felicitas Lee,  PM&R [facility]

## 2024-01-27 NOTE — TELEPHONE ENCOUNTER
You could put the patient in any 20-minute slot whether it is reserved or not for any day next week for her TCM.  Thank you.

## 2024-01-27 NOTE — TELEPHONE ENCOUNTER
Dr. Rodriguez, patient states she was not aware of appointment yesterday. Please advise on add-on TCM appointment next week, if patient should follow up with a different provider, or other recommendations. Thank you.    Patient reports she only has intermittent shortness of breath, it 95% better than when she went to hospital.   Patient complains of leg weakness. Reports she had leg weakness when leaving hospital. Hospital provider was aware and attributed weakness to laying in a bed for about 6 days. Weakness has not changed or worsened.  Patient states she is able to ambulate with her walker ok  Inquired why patient missed appointment yesterday. Patient states she was not aware she had an appointment.  No appointments with PCP noted for next week, will send message for further instructions.  Patient advised to go to ER with worsening shortness of breath, chest pain, worsening weakness, any severe symptoms, verbalizes understanding.

## 2024-01-29 ENCOUNTER — MED REC SCAN ONLY (OUTPATIENT)
Facility: CLINIC | Age: 79
End: 2024-01-29

## 2024-01-29 NOTE — TELEPHONE ENCOUNTER
Dr. Rodriguez, you do not have any type of open spot this week.  Can you add her one anywhere?  Or would next week be appropriate?

## 2024-01-29 NOTE — TELEPHONE ENCOUNTER
Patients daughter called requesting to schedule an appointment for the patient. I made her aware of the appt request below and she would like to receive a callback to her number.

## 2024-01-31 LAB
ABSOLUTE BASOPHILS: 19 CELLS/UL (ref 0–200)
ABSOLUTE EOSINOPHILS: 40 CELLS/UL (ref 15–500)
ABSOLUTE LYMPHOCYTES: 1107 CELLS/UL (ref 850–3900)
ABSOLUTE MONOCYTES: 158 CELLS/UL (ref 200–950)
ABSOLUTE NEUTROPHILS: 1776 CELLS/UL (ref 1500–7800)
BASOPHILS: 0.6 %
EOSINOPHILS: 1.3 %
HEMATOCRIT: 31.5 % (ref 35–45)
HEMOGLOBIN: 9.9 G/DL (ref 11.7–15.5)
LYMPHOCYTES: 35.7 %
MCH: 30 PG (ref 27–33)
MCHC: 31.4 G/DL (ref 32–36)
MCV: 95.5 FL (ref 80–100)
MONOCYTES: 5.1 %
MPV: 10.9 FL (ref 7.5–12.5)
NEUTROPHILS: 57.3 %
PLATELET COUNT: 310 THOUSAND/UL (ref 140–400)
RDW: 17.9 % (ref 11–15)
RED BLOOD CELL COUNT: 3.3 MILLION/UL (ref 3.8–5.1)
WHITE BLOOD CELL COUNT: 3.1 THOUSAND/UL (ref 3.8–10.8)

## 2024-02-01 ENCOUNTER — TELEPHONE (OUTPATIENT)
Dept: CARDIOLOGY CLINIC | Facility: HOSPITAL | Age: 79
End: 2024-02-01

## 2024-02-01 DIAGNOSIS — I50.9 ACUTE ON CHRONIC CONGESTIVE HEART FAILURE, UNSPECIFIED HEART FAILURE TYPE (HCC): Primary | ICD-10-CM

## 2024-02-01 RX ORDER — AMITRIPTYLINE HYDROCHLORIDE 25 MG/1
25 TABLET, FILM COATED ORAL NIGHTLY
Qty: 30 TABLET | Refills: 0 | Status: ON HOLD | OUTPATIENT
Start: 2024-02-01

## 2024-02-01 NOTE — TELEPHONE ENCOUNTER
Refill Request    Medication request: AMITRIPTYLINE 25 MG Oral Tab. TAKE 1 TABLET(25 MG) BY MOUTH EVERY NIGHT     LOV:12/26/2023 Felicitas Lee,    Due back to clinic per last office note: Return for injection & post injection per protocol.   - Note patient's procedure cancelled d/t being hospitalized at time of appt.  NOV: 2/6/2024 Felicitas Lee, [place]      [place]: 01/03/2024 #30    Urine drug screen (if applicable): n/a  Pain contract: n/a    LOV plan (if weaning or changing medications): Amitriptyline not mentioned in LOV note. During refill request 12/29/2023 pt noted she was still taking this medication.    (0) independent

## 2024-02-02 ENCOUNTER — HOSPITAL ENCOUNTER (INPATIENT)
Facility: HOSPITAL | Age: 79
LOS: 15 days | Discharge: NURSING, REHAB OR LONG-TERM CARE | End: 2024-02-18
Attending: EMERGENCY MEDICINE | Admitting: HOSPITALIST
Payer: MEDICARE

## 2024-02-02 ENCOUNTER — APPOINTMENT (OUTPATIENT)
Dept: GENERAL RADIOLOGY | Facility: HOSPITAL | Age: 79
End: 2024-02-02
Payer: MEDICARE

## 2024-02-02 DIAGNOSIS — M06.9 RHEUMATOID ARTHRITIS OF HAND, UNSPECIFIED LATERALITY, UNSPECIFIED WHETHER RHEUMATOID FACTOR PRESENT (HCC): ICD-10-CM

## 2024-02-02 DIAGNOSIS — M54.2 BILATERAL POSTERIOR NECK PAIN: ICD-10-CM

## 2024-02-02 DIAGNOSIS — I50.9 ACUTE ON CHRONIC CONGESTIVE HEART FAILURE, UNSPECIFIED HEART FAILURE TYPE (HCC): Primary | ICD-10-CM

## 2024-02-02 DIAGNOSIS — D69.6 THROMBOCYTOPENIA (HCC): ICD-10-CM

## 2024-02-02 DIAGNOSIS — M05.79 RHEUMATOID ARTHRITIS INVOLVING MULTIPLE SITES WITH POSITIVE RHEUMATOID FACTOR (HCC): ICD-10-CM

## 2024-02-02 DIAGNOSIS — M51.36 LUMBAR DEGENERATIVE DISC DISEASE: ICD-10-CM

## 2024-02-02 DIAGNOSIS — D69.3 IMMUNE THROMBOCYTOPENIA (HCC): ICD-10-CM

## 2024-02-02 LAB
ANION GAP SERPL CALC-SCNC: 17 MMOL/L (ref 0–18)
BASOPHILS # BLD AUTO: 0.01 X10(3) UL (ref 0–0.2)
BASOPHILS NFR BLD AUTO: 0.2 %
BNP SERPL-MCNC: ABNORMAL PG/ML
BUN BLD-MCNC: 17 MG/DL (ref 9–23)
BUN/CREAT SERPL: 11.6 (ref 10–20)
CALCIUM BLD-MCNC: 8.8 MG/DL (ref 8.7–10.4)
CHLORIDE SERPL-SCNC: 101 MMOL/L (ref 98–112)
CO2 SERPL-SCNC: 19 MMOL/L (ref 21–32)
CREAT BLD-MCNC: 1.47 MG/DL
DEPRECATED RDW RBC AUTO: 61.5 FL (ref 35.1–46.3)
EGFRCR SERPLBLD CKD-EPI 2021: 37 ML/MIN/1.73M2 (ref 60–?)
EOSINOPHIL # BLD AUTO: 0.02 X10(3) UL (ref 0–0.7)
EOSINOPHIL NFR BLD AUTO: 0.5 %
ERYTHROCYTE [DISTWIDTH] IN BLOOD BY AUTOMATED COUNT: 19.9 % (ref 11–15)
FLUAV + FLUBV RNA SPEC NAA+PROBE: NEGATIVE
FLUAV + FLUBV RNA SPEC NAA+PROBE: NEGATIVE
GLUCOSE BLD-MCNC: 55 MG/DL (ref 70–99)
GLUCOSE BLDC GLUCOMTR-MCNC: 73 MG/DL (ref 70–99)
HCT VFR BLD AUTO: 29.8 %
HGB BLD-MCNC: 9.5 G/DL
IMM GRANULOCYTES # BLD AUTO: 0.03 X10(3) UL (ref 0–1)
IMM GRANULOCYTES NFR BLD: 0.7 %
LYMPHOCYTES # BLD AUTO: 0.3 X10(3) UL (ref 1–4)
LYMPHOCYTES NFR BLD AUTO: 7.2 %
MCH RBC QN AUTO: 30.4 PG (ref 26–34)
MCHC RBC AUTO-ENTMCNC: 31.9 G/DL (ref 31–37)
MCV RBC AUTO: 95.2 FL
MONOCYTES # BLD AUTO: 0.04 X10(3) UL (ref 0.1–1)
MONOCYTES NFR BLD AUTO: 1 %
NEUTROPHILS # BLD AUTO: 3.77 X10 (3) UL (ref 1.5–7.7)
NEUTROPHILS # BLD AUTO: 3.77 X10(3) UL (ref 1.5–7.7)
NEUTROPHILS NFR BLD AUTO: 90.4 %
OSMOLALITY SERPL CALC.SUM OF ELEC: 283 MOSM/KG (ref 275–295)
PLATELET # BLD AUTO: 184 10(3)UL (ref 150–450)
POTASSIUM SERPL-SCNC: 4.3 MMOL/L (ref 3.5–5.1)
RBC # BLD AUTO: 3.13 X10(6)UL
RSV RNA SPEC NAA+PROBE: NEGATIVE
SARS-COV-2 RNA RESP QL NAA+PROBE: NOT DETECTED
SODIUM SERPL-SCNC: 137 MMOL/L (ref 136–145)
TROPONIN I SERPL HS-MCNC: 34 NG/L
WBC # BLD AUTO: 4.2 X10(3) UL (ref 4–11)

## 2024-02-02 PROCEDURE — 71045 X-RAY EXAM CHEST 1 VIEW: CPT

## 2024-02-02 PROCEDURE — 99223 1ST HOSP IP/OBS HIGH 75: CPT | Performed by: INTERNAL MEDICINE

## 2024-02-02 RX ORDER — FUROSEMIDE 10 MG/ML
40 INJECTION INTRAMUSCULAR; INTRAVENOUS ONCE
Status: COMPLETED | OUTPATIENT
Start: 2024-02-02 | End: 2024-02-02

## 2024-02-03 ENCOUNTER — APPOINTMENT (OUTPATIENT)
Dept: GENERAL RADIOLOGY | Facility: HOSPITAL | Age: 79
End: 2024-02-03
Attending: HOSPITALIST
Payer: MEDICARE

## 2024-02-03 ENCOUNTER — TELEPHONE (OUTPATIENT)
Dept: FAMILY MEDICINE CLINIC | Facility: CLINIC | Age: 79
End: 2024-02-03

## 2024-02-03 LAB
ANION GAP SERPL CALC-SCNC: 11 MMOL/L (ref 0–18)
BUN BLD-MCNC: 18 MG/DL (ref 9–23)
BUN/CREAT SERPL: 12.2 (ref 10–20)
CALCIUM BLD-MCNC: 8.3 MG/DL (ref 8.7–10.4)
CHLORIDE SERPL-SCNC: 103 MMOL/L (ref 98–112)
CO2 SERPL-SCNC: 22 MMOL/L (ref 21–32)
CREAT BLD-MCNC: 1.48 MG/DL
DEPRECATED RDW RBC AUTO: 58.4 FL (ref 35.1–46.3)
EGFRCR SERPLBLD CKD-EPI 2021: 36 ML/MIN/1.73M2 (ref 60–?)
ERYTHROCYTE [DISTWIDTH] IN BLOOD BY AUTOMATED COUNT: 19.9 % (ref 11–15)
GLUCOSE BLD-MCNC: 127 MG/DL (ref 70–99)
HCT VFR BLD AUTO: 25.5 %
HGB BLD-MCNC: 8.4 G/DL
MAGNESIUM SERPL-MCNC: 1.6 MG/DL (ref 1.6–2.6)
MCH RBC QN AUTO: 30.5 PG (ref 26–34)
MCHC RBC AUTO-ENTMCNC: 32.9 G/DL (ref 31–37)
MCV RBC AUTO: 92.7 FL
OSMOLALITY SERPL CALC.SUM OF ELEC: 285 MOSM/KG (ref 275–295)
PLATELET # BLD AUTO: 147 10(3)UL (ref 150–450)
POTASSIUM SERPL-SCNC: 3.6 MMOL/L (ref 3.5–5.1)
Q-T INTERVAL: 458 MS
QRS DURATION: 92 MS
QTC CALCULATION (BEZET): 508 MS
R AXIS: 117 DEGREES
RBC # BLD AUTO: 2.75 X10(6)UL
SODIUM SERPL-SCNC: 136 MMOL/L (ref 136–145)
T AXIS: -73 DEGREES
T4 FREE SERPL-MCNC: 1.2 NG/DL (ref 0.8–1.7)
TSI SER-ACNC: 10.68 MIU/ML (ref 0.55–4.78)
VENTRICULAR RATE: 74 BPM
VIT B12 SERPL-MCNC: >2000 PG/ML (ref 211–911)
WBC # BLD AUTO: 4 X10(3) UL (ref 4–11)

## 2024-02-03 PROCEDURE — 73030 X-RAY EXAM OF SHOULDER: CPT | Performed by: HOSPITALIST

## 2024-02-03 PROCEDURE — 72110 X-RAY EXAM L-2 SPINE 4/>VWS: CPT | Performed by: HOSPITALIST

## 2024-02-03 PROCEDURE — 99233 SBSQ HOSP IP/OBS HIGH 50: CPT | Performed by: HOSPITALIST

## 2024-02-03 RX ORDER — HYDROCODONE BITARTRATE AND ACETAMINOPHEN 10; 325 MG/1; MG/1
1 TABLET ORAL EVERY 6 HOURS PRN
Status: DISCONTINUED | OUTPATIENT
Start: 2024-02-03 | End: 2024-02-18

## 2024-02-03 RX ORDER — BUMETANIDE 0.25 MG/ML
1 INJECTION INTRAMUSCULAR; INTRAVENOUS
Status: DISCONTINUED | OUTPATIENT
Start: 2024-02-03 | End: 2024-02-08

## 2024-02-03 RX ORDER — ALENDRONATE SODIUM 70 MG/1
70 TABLET ORAL WEEKLY
Status: DISCONTINUED | OUTPATIENT
Start: 2024-02-03 | End: 2024-02-03

## 2024-02-03 RX ORDER — PANTOPRAZOLE SODIUM 40 MG/1
40 TABLET, DELAYED RELEASE ORAL
Status: DISCONTINUED | OUTPATIENT
Start: 2024-02-03 | End: 2024-02-18

## 2024-02-03 RX ORDER — MELATONIN
800 DAILY
Status: DISCONTINUED | OUTPATIENT
Start: 2024-02-03 | End: 2024-02-18

## 2024-02-03 RX ORDER — ALENDRONATE SODIUM 70 MG/1
70 TABLET ORAL WEEKLY
Status: DISCONTINUED | OUTPATIENT
Start: 2024-02-07 | End: 2024-02-18

## 2024-02-03 RX ORDER — MELATONIN
325
Status: DISCONTINUED | OUTPATIENT
Start: 2024-02-03 | End: 2024-02-18

## 2024-02-03 RX ORDER — AMIODARONE HYDROCHLORIDE 200 MG/1
200 TABLET ORAL DAILY
COMMUNITY
Start: 2024-01-30

## 2024-02-03 RX ORDER — POTASSIUM CHLORIDE 20 MEQ/1
40 TABLET, EXTENDED RELEASE ORAL EVERY 4 HOURS
Status: COMPLETED | OUTPATIENT
Start: 2024-02-03 | End: 2024-02-03

## 2024-02-03 RX ORDER — AMITRIPTYLINE HYDROCHLORIDE 25 MG/1
25 TABLET, FILM COATED ORAL NIGHTLY
Status: DISCONTINUED | OUTPATIENT
Start: 2024-02-03 | End: 2024-02-18

## 2024-02-03 RX ORDER — CARVEDILOL 25 MG/1
25 TABLET ORAL 2 TIMES DAILY
Status: DISCONTINUED | OUTPATIENT
Start: 2024-02-03 | End: 2024-02-10

## 2024-02-03 NOTE — PLAN OF CARE
Margaret Silverio Patient Status:  Inpatient    3/14/1945 MRN [id number]   Location [facility] 3W/SW Attending Manda Mccoy MD   Hosp Day # 0 PCP Johnathon Rodriguez DO     Cardiology Nocturnal APN Note    Page Received: ED MD 5705    Briefly: (Documentation from chart review)     Margaret Silverio is a 77 yo F with PMH/PSH of nonsichemic cardiomyopathy s/p ICD, PAF/Aflutter, MVR  previously on eliquis, HTN, recently admitted for GIB w/ AVMs now off a/c  presents from home c/o SOB resulting in fall.  Patient arrived on 6L NC, audible wheeze.  In the ED, EKG HR 4 accelerated junctional, CXR stable, troponin 34, BNP 98025.  Cardiology consulted for SOB / volume overload.    In the ED, patient received 40 IV lasix     Primary Cardiologist Laurel Sargent and Elvi last OV 24     Vital Signs:       2024    11:45 PM 2/3/2024    12:32 AM   Vitals History   /75 146/74   BP Location  Right arm   Pulse 79 76   Resp 18 20   Temp  97.8 °F (36.6 °C)   SpO2 99 % 100 %   Weight  149 lbs 14 oz   BMI  24.94 kg/m2        Labs:   Lab Results   Component Value Date    WBC 4.2 2024    HGB 9.5 2024    HCT 29.8 2024    .0 2024    CREATSERUM 1.47 2024    BUN 17 2024     2024    K 4.3 2024     2024    CO2 19.0 2024    GLU 55 2024    CA 8.8 2024    TROPHS 34 2024       Diagnostics:   XR CHEST AP PORTABLE  (CPT=71045)    Result Date: 2024  CONCLUSION:   No change  Chronic right basilar effusion and round atelectasis  No pulmonary edema    Dictated by (CST): Rey Jackson MD on 2024 at 9:10 PM     Finalized by (CST): Rey Jackson MD on 2024 at 9:13 PM           Allergies:  Allergies   Allergen Reactions    Lisinopril Coughing       Medications:    alendronate    amitriptyline    carvedilol    sacubitril-valsartan    ferrous sulfate    folic acid    HYDROcodone-acetaminophen    pantoprazole     bumetanide    Assessment:   #SOB / volume overload  #Elevated BNP  #NICM s/p ICD  #PAF/Aflutter; Echo 1/2024 EF 35-40%  #MVR  #HTN  #GIB w/ AVMs, no longer on eliquis       Plan:  - Evaluate response to lasix and redose if needed  - Wean O2 as able   - PT/OT evaluation if patient may be better served at rehab instead of direct to home once ready for discharge   - Continue to monitor overnight  - Formal Cardiology consult to follow in AM.       KODI Aguillon  [facility]  2/3/2024  1:01 AM

## 2024-02-03 NOTE — CM/SW NOTE
02/03/24 1000   CM/SW Referral Data   Referral Source Physician   Reason for Referral Discharge planning   Informant Daughter;Spouse/Significant Other   Readmission Assessment   Factors that patient feels contributed to this readmission Acute/Chronic Clinical Presentation   Pt's living situation prior to admission? Home with family   Pt's level of independence at discharge? Some assist (mod)   Was pt. discharged w/out services? Yes   ----D/C services: Reason(s) why patient was discharged w/out services?   (none indicated)   Medical Hx   Does patient have an established PCP? Yes  (Johnathon Rodriguez)   Patient Info   Patient's Home Environment House   Number of Levels in Home 1   Number of Stair in Home 1   Patient lives with Spouse/Significant other  (2 grandkids (17 and 12 yrs))   Patient Status Prior to Admission   Independent with ADLs and Mobility No   Pt. requires assistance with Driving;Ambulating   Services in place prior to admission DME/Supplies at home   Type of DME/Supplies Standard Walker   Discharge Needs   Anticipated D/C needs Subacute rehab;To be determined   Services Requested   PASRR Level 1 Submitted Yes   Choice of Post-Acute Provider   Informed patient of right to choose their preferred provider Yes   List of appropriate post-acute services provided to patient/family with quality data   (CÉSAR ref in Aidin - needs f/up)       10:15AM  Received MDO for DC Planning. SW spoke to pt via her room phone. Pt is requesting SW speak to her .    BRADEN contacted pt's spouse Low via phone. Above assessment completed.    He confirmed they live in a single level home w/ 1 step to enter. Pt uses a walker PRN for ambulation at home. Pt has no hx w/ HH or CÉSAR. Pt's grandkids  - ages 17 and 12 - live w/ them.    Per Low's request, SW contacted pt's dtr Barbi. She confirmed she and pt spoke previously about CÉSAR. Per Barbi, pt had asked her to contact Dr. Rodriguez for assistance w/ CÉSAR placement  prior to pt returning to [facility] for this Admission.    BRADEN discussed CÉSAR referral process, next steps, and need for pt to meet criteria and insurance approval for final arrangements. Barbi expressed understanding.    Pt's dtr does not believe pt would be agreeable to Outpatient PT and would not allow someone in to the home for HH services.    Barbi also inquired about [facility]. SW explained spiritual care consult for f/up in regards to this and explained pt must be AOX4 to complete document. Barbi expressed understanding. Per protocol, SW placed Spiritual Care consult.    Tentative CÉSAR referrals sent in Aidin for review. PASRR completed/uploaded. Tentative HH referrals also sent in Aidin. F2F entered/sent.    12:50PM  Received notice from PT Kayla - note available in Epic. SW sent to pending CÉSAR referrals via Aidin.    Need for DC:   1.OT eval/note  2. CÉSAR list/choice   3. If CÉSAR: Ins Auth     PLAN: TBD, poss CÉSAR - pending above & med clear      SW/CM to remain available for support and/or discharge planning.           Juliana Rodrigez, MSW, LSW [id number]

## 2024-02-03 NOTE — CONSULTS
[facility]  Cardiology Consultation    Margaret Silverio Patient Status:  Inpatient    3/14/1945 MRN [id number]   Location [facility] 3W/SW Attending Manda Mccoy MD   Hosp Day # 0 PCP Johnathon Rodriguez DO     Consults      Reason for Consultation     Shortness of breath        History of Present Illness     Margaret Silverio is a a(n) 78 year old female who was recently discharged from the hospital with pneumonia here with recurrent shortness of breath.  She reports that her shortness of breath really was not improved when she went home and there is not been a significant change since she has been home but she decided to come back to the emergency room because her breathing was still very difficult.  She has lower extremity edema, no significant orthopnea or PND.  Patient reports any amount of exertion causes significant amount of shortness of breath    In the emergency room she was in atrial flutter with controlled ventricular response.  Today she is in AV paced rhythm      History:     Past Medical History:   Diagnosis Date    Anemia     Arrhythmia     Arthritis     Deep vein thrombosis (HCC)     Essential hypertension     High blood pressure     History of blood transfusion     Seizure disorder (HCC)     Seizures (HCC)      Past Surgical History:   Procedure Laterality Date    COLONOSCOPY N/A 2024    Dr. Rios    COLONOSCOPY N/A 2024    Procedure: COLONOSCOPY;  Surgeon: Zoraida Rios MD;  Location: [facility] ENDOSCOPY    EGD N/A 2024    Dr. Rios    OTHER SURGICAL HISTORY      Heart Surgery     OTHER SURGICAL HISTORY      Bilateral shoulder replacement      No family history on file.   reports that she quit smoking about 10 years ago. Her smoking use included cigarettes. She smoked an average of 0.3 packs per day. She has never used smokeless tobacco. She reports that she does not drink alcohol and does not use drugs.      Allergies:     Allergies   Allergen Reactions     Lisinopril Coughing         Medications       Current Facility-Administered Medications:     amitriptyline (Elavil) tab 25 mg, 25 mg, Oral, Nightly    carvedilol (Coreg) tab 25 mg, 25 mg, Oral, BID    sacubitril-valsartan (Entresto) 24-26 MG per tab 1 tablet, 1 tablet, Oral, BID    ferrous sulfate DR tab 325 mg, 325 mg, Oral, Daily with breakfast    folic acid (Folvite) tab 800 mcg, 800 mcg, Oral, Daily    HYDROcodone-acetaminophen (Norco)  MG per tab 1 tablet, 1 tablet, Oral, Q6H PRN    pantoprazole (Protonix) DR tab 40 mg, 40 mg, Oral, BID AC    bumetanide (Bumex) 0.25 MG/ML injection 1 mg, 1 mg, Intravenous, BID (Diuretic)    [START ON 2024] alendronate (Fosamax) tab 70 mg, 70 mg, Oral, Weekly      Review of Systems     10 point ROS was negative except  Shortness of breath      Telemetry:         Telemetry: AV paced      Physical Exam     Physical Exam   Blood pressure 116/68, pulse 60, temperature 98.8 °F (37.1 °C), temperature source Oral, resp. rate 21, weight 149 lb 14.4 oz (68 kg), SpO2 95%.  Temp (24hrs), Av °F (36.7 °C), Min:97.3 °F (36.3 °C), Max:98.8 °F (37.1 °C)    Wt Readings from Last 3 Encounters:   24 149 lb 14.4 oz (68 kg)   24 153 lb 11.2 oz (69.7 kg)   23 145 lb (65.8 kg)     Sitting upright in no distress  NAD  PERRLA/EOMI  Neck veins not elevated  Carotids- no bruits  CTA bilaterally  Cardiac-regular rate and rhythm, TR murmur  Abdomen- Soft,Nontender, normal BS  Extremities- pulses normal  Edema-1+ edema  Mood /Affect Congruent  Skin- no lesions            Lab/Radiology Results     Recent Labs   Lab 24   GLU 55*   BUN 17   CREATSERUM 1.47*   EGFRCR 37*   CA 8.8      K 4.3      CO2 19.0*     Recent Labs   Lab 24  1345 24  0626   RBC 3.30* 3.13* 2.75*   HGB 9.9* 9.5* 8.4*   HCT 31.5* 29.8* 25.5*   MCV 95.5 95.2 92.7   MCH 30.0 30.4 30.5   MCHC 31.4* 31.9 32.9   RDW 17.9* 19.9* 19.9*   NEPRELIM  --  3.77  --     WBC 3.1* 4.2 4.0    184.0 147.0*         [unfilled]  No results for input(s): \"BNP\" in the last 168 hours.  Lab Results   Component Value Date    INR 1.60 (H) 01/15/2024    INR 2.60 (H) 01/14/2024    INR 3.63 (H) 01/13/2024     No results found for: \"TROP\"      XR CHEST AP PORTABLE  (CPT=71045)    Result Date: 2/2/2024  CONCLUSION:   No change  Chronic right basilar effusion and round atelectasis  No pulmonary edema    Dictated by (CST): Rey Jackson MD on 2/02/2024 at 9:10 PM     Finalized by (CST): Rey Jackson MD on 2/02/2024 at 9:13 PM          EKG 12 Lead    Result Date: 2/2/2024  Accelerated Junctional rhythm Right axis deviation Low voltage QRS, consider pulmonary disease, pericardial effusion, or normal variant Cannot rule out Anteroseptal infarct , age undetermined ST & T wave abnormality, consider inferolateral ischemia Abnormal ECG When compared with ECG of 12-JAN-2024 22:00, Junctional rhythm has replaced Electronic ventricular pacemaker       Problem List     Patient Active Problem List   Diagnosis    Altered mental status    Altered mental status, unspecified altered mental status type    Seizure (HCC)    Lactic acidosis    Leukocytosis    Acute GI bleeding    GEORGE (acute kidney injury) (HCC)    Thrombocytopenia (HCC)    Metabolic acidosis    Atrial fibrillation, chronic (HCC)    Sepsis due to urinary tract infection  (HCC)    Sepsis due to Escherichia coli (HCC)    ABLA (acute blood loss anemia)    Melena    Atrial fibrillation with rapid ventricular response (HCC)    Acute chest pain    Acute on chronic congestive heart failure, unspecified heart failure type (HCC)    Pleural effusion    ACC/AHA stage B systolic heart failure (HCC)    Congestive heart failure (HCC)    Coronary arteriosclerosis    Essential (primary) hypertension    Mitral valve stenosis    Rheumatoid arthritis (HCC)    Stage 3 chronic kidney disease (HCC)    Atrial flutter (HCC)    Acute on chronic HFrEF (heart failure with  reduced ejection fraction) (HCC)    Dyspnea, unspecified type    Hypothermia, initial encounter    Anticoagulated    Coagulopathy (HCC)    SIRS (systemic inflammatory response syndrome) (HCC)    Anemia due to stage 3 chronic kidney disease  (HCC)    Anemia due to GI blood loss    Anemia of chronic disease    Lymphopenia               Diagnostic Testing Reviewed:     EK/3/24      TTE : Date: 2024  Conclusions:     1. Left ventricle: The cavity size was normal. Wall thickness was normal.      Systolic function was moderately reduced. The estimated ejection fraction      was 35-40%, by biplane method of disks. Unable to assess LV diastolic      function due to heart rhythm.   2. Right ventricle: The cavity size was increased. Systolic function was      reduced.   3. Left atrium: The left atrial volume was moderately increased.   4. Right atrium: The atrium was mildly dilated.   5. Mitral valve: A bioprosthesis is present. The mean diastolic gradient was      5mm Hg.   6. Tricuspid valve: There was severe regurgitation.   7. Pulmonary arteries: Systolic pressure was mildly increased, estimated to      be 43mm Hg.           Assessment/Plan:       Shortness of Breath  - IV diuresis    Recurrent GIB  -EGD/colonoscopy  with small transverse colon AVM, s/p transfusion PRBC, FFP  -hx multiple GIB  -holding OAC  -CCTA for watchman eval did not clearly visualize the BRANDYN       Paroxysmal atrial fibrillation /Flutter  Currently in AFL with native conduction. Rate is controlled  -previously discussed watchman as outpatient, but did not want to pursue further  -eliquis on hold   -BB     HFrEF  NICM, LVEF 35%, s/p [place] DC-ICD 2023 for persistent low EF <35% despite 3 months GDMT  -Patient may have a significant amount of RV pacing.  Next week we will have device check to see the percentage of RV pacing and consider extending the AV delay if possible to avoid RV pacing or consideration of an upgrade to CRT-D at some  point       SSS  S/p DC-ICD 9/2023 as above  -AV paced on tele     Mitral valve bioprosthesis  Performed in [place], trying to obtain records  -mean gradient 5mmHg     HTN  Normotensive  -entresto, coreg     Severe tricuspide valve regurgitation        Rheumatoid arthritis      C5      Jenny Cleestin MD    Cardiac Electrophysiology  [facility]  2/3/2024  7:06 AM

## 2024-02-03 NOTE — ED QUICK NOTES
Orders for admission, patient is aware of plan and ready to go upstairs. Any questions, please call ED RN Amelie at extension [phone].     Patient Covid vaccination status: Fully vaccinated     COVID Test Ordered in ED: SARS-CoV-2/Flu A and B/RSV by PCR (GeneXpert)    COVID Suspicion at Admission: N/A    Running Infusions:  None    Mental Status/LOC at time of transport: AOx3    Other pertinent information:   CIWA score: N/A   NIH score:  N/A

## 2024-02-03 NOTE — TELEPHONE ENCOUNTER
Daughter called.   Cancelled appt today at 9:40am with Dr. Rodriguez.     Patient currently admitted in hospital.

## 2024-02-03 NOTE — HISTORICAL OFFICE NOTE
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 Shane for heart failure with reduced ejection fraction.    Was in the hospital last month with the severe GI bleeding anemia treated heart failure and EF 30% on echo January 2024.  We did do a CTA at the time to visualize left atrial appendage and it appeared to be occluded probably surgically at the time mitral valve surgery.  Still awaiting surgical records from [place].  Peers like does not need anticoagulation nor candidate or need for any watchman despite the atrial fibrillation given the left atrial appendage appears to be stable occluded likely.    Comes in with family also reports edema taking Lasix daily was diuresed in the hospital with IV diuretics.  Does have follow-up with primary care regarding anemia and is no longer on anticoagulation.    Previously noted: Her ejection fraction has continued at 33% despite goal-directed medical therapy greater than 90 days.  She is class II heart failure and a narrow QRS.  She does have paroxysmal atrial fibrillation a long SD.  EKG 2 months ago with sinus rhythm current one looks probably sinus with very long SD with ectopy.  She has shortness of breath it 2 flights of stairs and 2 blocks but no PND orthopnea noted.    Patient has a history of possible TIA although she says it was a seizure back in November 2022 also had a GI bleed in April 2023 but is back on her Eliquis but with history of these issues high risk for anticoagulation would consider for watchman.  Talked about some of these issues she reported that she is doing fine on the blood thinners at this point is rather vague with a  history with a GI bleed that required her to be in the ICU April 2023 also vague about the possible TIA history and was not sure about that back in November 2022 with or without was just a seizure.  Does have history of mitral valve replacement and EF has been fluctuating and last dropped about 35% and medications were adjusted.  Cardiac risk factors Never smoked  Past Medical History  1.On amiodarone therapy  2.Atrial fibrillation (Afib), paroxysmal - A Fib  3.Claudication  4.ACC/AHA stage B systolic heart failure  5.History of mitral valve replacement (MVR) - Hx  6.Benign essential HTN  Past Surgical History  1.Automatic implantable cardiac defibrillator (S/P AICD)  2.History of mitral valve replacement (MVR) - Hx  Family History  1. Father - No history of Family history of heart disease  2. Mother - No history of Family history of heart disease  Social History  Smoking status Never smoked  Tobacco usage - No (Non-smoker (finding))  Review of systems  Eyes No history of Blurry vision, Visual changes and Double vision  Cardiovascular Edema  No history of Chest pain, RAM, Palpitations, Syncope, PND, Orthopnea and Claudication  Respiratory No history of SOB, Wheezing and Sputum  Hem/Lymphatic No history of Easy bruising, Blood clots, Hx of blood transfusion, Anemia and Bleeding problems  Physical Examination  Constitutional 99%o2  Vitals Left Arm Sitting  / 62 mmHg, Pulse rate 60 bpm, Regular, Height in 5' 4\", BMI: 24.9, Weight in 145 lbs (or) 66 kgs and BSA : 1.73 cc/m²  General Appearance No Acute Distress and Appropriate  Head/Eyes/Ears/Nose/Mouth/Throat Conjunctiva pink, Sclera Clear and Mucous membranes Moist  Neck Normal carotid pulsations  Respiratory Unlabored and Equal bilaterally  Cardiovascular Regular rhythm. Normal and normal S1 and S2    Gastrointestinal Abdomen soft, Non-tender and Normoactive bowel sounds  Musculoskeletal Normal spine  Gait Normal gait  Strength and tone Normal muscle  strength  Upper Extremities No clubbing and No cyanosis  Lower Extremities Pulses 2+ and equal bilaterally and No edema  Skin Warm and dry and No rashes or lesions  Neurologic / Psychiatric Alert and Oriented  Speech Normal speech  Allergies  1.lisinopril - Ingredient(Reaction:cough, Severity:Mild)  Medications (Info obtained by: Verbal)  1.alendronate 70 mg tablet, Take 1 tablet orally once a week  2.amiodarone 200 mg tablet, Take 1 tablet orally 2 times a day.  3.amitriptyline 10 mg tablet, Take 1 tablet orally once a day as needed.  4.carvediloL 12.5 mg tablet, Take 1 tablet orally 2 times a day.  5.Entresto 49 mg-51 mg tablet, Take 1 tablet orally 2 times a day.  6.folic acid 800 mcg tablet, Take 1 tablet orally once a day.  7.metHOTREXate sodium 2.5 mg tablet, Take 6 tablets orally once a week  8.pantoprazole 40 mg tablet,delayed release, Take 1 tablet orally once a day.  Impression  1.On amiodarone therapy  2.Automatic implantable cardiac defibrillator (S/P AICD)  3.Atrial fibrillation (Afib), paroxysmal - A Fib  4.Claudication  5.ACC/AHA stage B systolic heart failure  6.History of mitral valve replacement (MVR) - Hx  7.Benign essential HTN  Assessment & Plan  Heart failure with reduced ejection fraction  Echo EF 38% January 2024  EF 33% nonischemic with no coronary disease at cath despite goal-directed medical therapy   Follows with Dr. Shane March 11    Atrial fibrillation, paroxysmal  Amiodarone started December 2023  Does have elevated TSH but normal T4 recheck labs in 3 months and decrease amiodarone dose  He had paroxysmal atrial fibrillation that was noted during hospitalization with possible TIA in the fall 2022  15% burden of A-fib on device  No longer on anticoagulation due to severe life-threatening GI bleed and now left atrial appendage is shown to be likely occluded surgically and does not need long-term anticoagulation likely  [id number] score equal to 5    Plan  Decrease amiodarone to 200 mg  only once a day  Thyroid function labs in 3 months  Follow-up myself in 6 months  Follow myself also annually during device check  Annual follow-up in the device clinic  Routine follow-up with Dr. Boles as scheduled March 11  Medications Ordered  1.amiodarone 200 mg tablet, Take 1 tablet orally once a day.  Labs and Diagnostics ordered  1.EKG (electrocardiogram) (Today)  2.Thyroid Function Panel (3 Months)  Future appointments  1.Referral Visit - Johnathon Rodriguez ([email or web address]) : (Today)  2.Follow up visit - Luis Sargent MD w device (1 Year)  3.Follow up visit - Luis Sargent MD (6 Months)  Miscellaneous  1.Weight monitoring (regime/therapy)  2.Reviewed trans thoracic echocardiogram, lower extremity arterial ultrasound with the patient.  3.Reviewed creatinine with the patient.  Nurses documentation  Upcoming surgeries: None  Refills: None  EKG: None  Use of assistive devices(s): None     Patient instructions  Decrease amiodarone to 200 mg only once a day  Thyroid function labs in 3 months  Follow-up myself in 6 months  Follow myself also annually during device check  Annual follow-up in the device clinic  Routine follow-up with Dr. Boles as scheduled March 11  Lab Details  BASIC METABOLIC PANEL (8)  09/25/2023 02:58:50 PM  GLUCOSE 104 70-99 mg/dL H F  SODIUM 139 136-145 mmol/L  F  POTASSIUM 4.0 3.5-5.1 mmol/L  F  CHLORIDE 106  mmol/L  F  CO2 20.0 21.0-32.0 mmol/L L F  ANION GAP 13 0-18 mmol/L  F  BUN 18 7-18 mg/dL  F  CREATININE 1.47 0.55-1.02 mg/dL H F  BUN/ CREAT RATIO 12.2 10.0-20.0  F  CALCIUM 9.6 8.5-10.1 mg/dL  F  OSMOLALITY CALCULATED 290 275-295 mOsm/kg  F  E GFR CR 37 >=60 mL/min/1.73m2 L F  FASTING PATIENT BMP ANSWER Yes   F  MRSA SCREEN BY PCR  09/25/2023 02:53:49 PM  MRSA SCREEN BY PCR Negative Negative  F  RBC MORPHOLOGY SCAN  09/25/2023 02:28:46 PM  MORPHOLOGY See morphology below Normal, Slide reviewed, see previous RBC morphology. A F  PLATELET  MORPHOLOGY Normal Normal  F  MACROCYTOSIS 1+   F  POLYCHROMASIA 1+   F  OVALOCYTES 1+   F  CBC W/ DIFFERENTIAL  09/25/2023 02:27:39 PM  WBC 3.8 4.0-11.0 x10(3) uL L F  RED BLOOD COUNT 3.29 3.80-5.30 x10(6)uL L F  HGB 10.1 12.0-16.0 g/dL L F  HCT 33.0 35.0-48.0 % L F  MEAN CELL VOLUME 100.3 80.0-100.0 fL H F  MEAN CORPUSCULAR HEMOGLOBIN 30.7 26.0-34.0 pg  F  MEAN CORPUSCULAR HGB CONC 30.6 31.0-37.0 g/dL L F  RED CELL DISTRIBUTION WIDTH-SD 67.4 35.1-46.3 fL H F  RED CELL DISTRIBUTION WIDTH CV 19.2 11.0-15.0 % H F  PLATELETS 190.0 150.0-450.0 10(3)uL  F  NEUTROPHIL ABS PRELIM 2.31 1.50-7.70 x10 (3) uL  F  NEUTROPHIL ABSOLUTE 2.31 1.50-7.70 x10(3) uL  F  LYMPHOCYTE ABSOLUTE 0.94 1.00-4.00 x10(3) uL L F  MONOCYTE ABSOLUTE 0.31 0.10-1.00 x10(3) uL  F  EOSINOPHIL ABSOLUTE 0.14 0.00-0.70 x10(3) uL  F  BASOPHIL ABSOLUTE 0.05 0.00-0.20 x10(3) uL  F  IMMATURE GRANULOCYTE COUNT 0.02 0.00-1.00 x10(3) uL  F  NEUTROPHIL % 61.4 %  F  LYMPHOCYTE % 24.9 %  F  MONOCYTE % 8.2 %  F  EOSINOPHIL % 3.7 %  F  BASOPHIL % 1.3 %  F  IMMATURE GRANULOCYTE RATIO % 0.5 %  F  PROTHROMBIN TIME (PT)  09/25/2023 01:40:46 PM  PROTIME 18.9 11.6-14.8 seconds H F  INR 1.61 0.85-1.16 H F  Diagnostics Details  Trans Thoracic Echocardiogram 09/01/2023  1.The left ventricle is normal in size. The left ventricular wall thickness is normal. Global left ventricular systolic function is moderately decreased. The left ventricular ejection fraction is 37%. Regional wall motion analysis shows dyskinesis of mid anteroseptal segment and mid inferoseptal segment. Wall motion score index is 4. Left ventricular diastolic function is indeterminate.    2.Right ventricular systolic function is severely decreased.    3.The left atrial diameter is mildly increased.    4.The right atrium is mildly enlarged.    5.Severe (4+) tricuspid regurgitation.    6.A bio prosthetic valve is noted at the mitral location. The mean trans mitral gradient is 5.0 mmHg.    7.The pulmonary artery  systolic pressure is 40 mmHg.    Lower Extremity Arterial Ultrasound 09/22/2022  1.The study quality is good.    2.Bilateral scattered plaque in the lower extremity arterial system causing less than 50% stenosis.    3.Bilateral lower extremity arterial system demonstrates tri-phasic & bi-phasic waveforms.    4.Left anterior tibial artery demonstrates low velocity flow.    5.Three vessel runoff to the feet bilaterally.

## 2024-02-03 NOTE — PHYSICAL THERAPY NOTE
PHYSICAL THERAPY EVALUATION - INPATIENT     Room Number: 332/332-A  Evaluation Date: 2/3/2024  Type of Evaluation: Initial   Physician Order: PT Eval and Treat    Presenting Problem: acute on chronic CHF, weakness and shortness of breath. Fell out of bed.  Co-Morbidities : multiple previous admissions she was just discharged on January 20.  She had been admitted for GI bleed and found to have AVMs.  Reason for Therapy: Mobility Dysfunction and Discharge Planning    PHYSICAL THERAPY ASSESSMENT     Patient is a 78 year old female admitted 2/2/2024 for acute on chronic CHF, weakness and shortness of breath. Fall out of bed.   Patient's current functional deficits include impaired tolerance toward activity due to fatigue and pain, impaired bed mobility, transfers, ambulation, strength and balance which are below the patient's pre-admission status.  Patient received supine in bed. RN approved activity. Therapist educated pt on POC and physiological benefits of mobilization. Patient agreeable to participate. Primary complaint is bilateral shoulder and posterior lateral neck pain which she does not quantify. Baseline bilateral hand/wrist arthritic flexion deformities with new swelling per pt report.   Bed mobility: Mod A x 1 supine>sit. Assist with LE's and trunk. Sat EOB for 10 minutes requiring Min A improving to SBA as time in sitting increased.   Transfers: Max A x 1 with use of gait belt. Stand pivot transfer. 2nd assist close standby for safety. Able to tolerate bilateral LE's in brief weight bearing/standing position, however, limited by pain.     Patient in chair at end of session with needs in reach and alarm activated.       The patient's Approx Degree of Impairment: 72.57% has been calculated based on documentation in the [facility] '6 clicks' Inpatient Basic Mobility Short Form.  Research supports that patients with this level of impairment may benefit from sub-acute rehab to optimize functional  outcomes.    Patient will benefit from continued IP PT services to address these deficits in preparation for discharge.    DISCHARGE RECOMMENDATIONS  PT Discharge Recommendations: Sub-acute rehabilitation    PLAN  PT Treatment Plan: Bed mobility;Body mechanics;Endurance;Patient education;Energy conservation;Family education;Gait training;Range of motion;Strengthening;Transfer training;Balance training  Rehab Potential : Fair  Frequency (Obs): 3-5x/week       PHYSICAL THERAPY MEDICAL/SOCIAL HISTORY     Problem List  Principal Problem:    Acute on chronic congestive heart failure, unspecified heart failure type (HCC)      HOME SITUATION  Home Situation  Type of Home: House  Home Layout: One level  Lives With: Spouse (supportive dtr lives nearby)  Drives: No  Patient Owned Equipment: Rolling walker  Patient Regularly Uses: None     Prior Level of [place]: Patient reports spouse assists with ADL's and IADL's. Ambulates short distances with RW and assist x 1. Admits to multiple falls.     SUBJECTIVE  \"I hurt everywhere\"     PHYSICAL THERAPY EXAMINATION     OBJECTIVE  Precautions: Bed/chair alarm  Fall Risk: High fall risk    WEIGHT BEARING RESTRICTION  Weight Bearing Restriction: None                PAIN ASSESSMENT  Rating: Unable to rate  Location: bilateral shoulders,  L posterior neck (per pt, chronici)  Management Techniques: Activity promotion;Body mechanics;Repositioning    COGNITION  Overall Cognitive Status:  Impaired  Following Commands:  follows multistep commands with increased time    RANGE OF MOTION AND STRENGTH ASSESSMENT  Upper extremity ROM and strength are impaired bilateral fingers/wrist: arthritic deformities bilaterally  Lower extremity ROM is within functional limits   Lower extremity strength is impaired bilateral LE's: 3/5    BALANCE  Static Sitting: Fair  Dynamic Sitting: Fair -  Static Standing: Poor +  Dynamic Standing: Poor    NEUROLOGICAL FINDINGS           Sensation: bilateral hands           ACTIVITY TOLERANCE  Pulse: 60  Heart Rate Source: Monitor     BP: 134/82  BP Location: Right arm  BP Method: Automatic  Patient Position: Sitting    O2 WALK  Oxygen Therapy  SPO2% on Room Air at Rest: 99  SPO2% Ambulation on Room Air: 99 (with activity NOT ambulation)    AM-PAC '6-Clicks' INPATIENT SHORT FORM - BASIC MOBILITY  How much difficulty does the patient currently have...  Patient Difficulty: Turning over in bed (including adjusting bedclothes, sheets and blankets)?: A Lot   Patient Difficulty: Sitting down on and standing up from a chair with arms (e.g., wheelchair, bedside commode, etc.): A Lot   Patient Difficulty: Moving from lying on back to sitting on the side of the bed?: A Lot   How much help from another person does the patient currently need...   Help from Another: Moving to and from a bed to a chair (including a wheelchair)?: A Lot   Help from Another: Need to walk in hospital room?: A Lot   Help from Another: Climbing 3-5 steps with a railing?: Total     AM-PAC Score:  Raw Score: 11   Approx Degree of Impairment: 72.57%   Standardized Score (AM-PAC Scale): 33.86   CMS Modifier (G-Code): CL    FUNCTIONAL ABILITY STATUS  Functional Mobility/Gait Assessment  Gait Assistance: Not tested      Exercise/Education Provided:  Bed mobility  Body mechanics  Energy conservation  Functional activity tolerated  Posture  ROM  Strengthening  Lower therapeutic exercise:  Ankle pumps  LAQ  Transfer training    Patient End of Session: Up in chair;Needs met;Call light within reach;RN aware of session/findings;Alarm set    CURRENT GOALS    Goals to be met by: 2/17/24  Patient Goal Patient's self-stated goal is: return to PLOF   Goal #1 Patient is able to demonstrate supine - sit EOB @ level: CGA     Goal #1   Current Status    Goal #2 Patient is able to demonstrate transfers EOB to/from Chair/Wheelchair at assistance level: minimum assistance with walker - rolling     Goal #2  Current Status    Goal #3 Patient  is able to ambulate 25 feet with assist device: walker - rolling at assistance level: minimum assistance   Goal #3   Current Status    Goal #4 Patient to demonstrate independence with home activity/exercise instructions provided to patient in preparation for discharge.   Goal #4   Current Status    Patient Evaluation Complexity Level:  History Moderate - 1 or 2 personal factors and/or co-morbidities   Examination of body systems Moderate - addressing a total of 3 or more elements   Clinical Presentation Moderate - Evolving   Clinical Decision Making Moderate Complexity       Therapeutic Activity: 24 minutes

## 2024-02-03 NOTE — H&P
History and Physical     Margaret Silverio Patient Status:  Emergency    3/14/1945 MRN [id number]   Location [facility] EMERGENCY DEPARTMENT Attending Shashi Williamson   Hosp Day # 0 PCP Johnathon Rodriguez DO     Chief Complaint: Shortness of breath    Subjective:    Margaret Silverio is a 78 year old female with well-known to us from multiple previous admissions she was just discharged on .  She had been admitted for GI bleed and found to have AVMs.  She was discharged off of oral anticoagulation and has a history of atrial fibrillation.  She returns with increased weakness and shortness of breath.   In the emergency room she was found to have an elevated BNP of 18,762.  She had a normal white count hemoglobin of 9.5 platelet count of 184.  Blood sugar was low at 55, creatinine was 1.47 which appears to be baseline with a GFR of 37 troponin was negative.  She was treated with Lasix 40 mg IV.    History/Other:      Past Medical History:  Past Medical History:   Diagnosis Date    Anemia     Arrhythmia     Arthritis     Deep vein thrombosis (HCC)     Essential hypertension     High blood pressure     History of blood transfusion     Seizure disorder (HCC)     Seizures (HCC)         Past Surgical History:   Past Surgical History:   Procedure Laterality Date    COLONOSCOPY N/A 2024    Dr. Rios    COLONOSCOPY N/A 2024    Procedure: COLONOSCOPY;  Surgeon: Zoraida Rios MD;  Location: [facility] ENDOSCOPY    EGD N/A 2024    Dr. Rios    OTHER SURGICAL HISTORY      Heart Surgery     OTHER SURGICAL HISTORY      Bilateral shoulder replacement        Social History:  reports that she quit smoking about 10 years ago. Her smoking use included cigarettes. She smoked an average of 0.3 packs per day. She has never used smokeless tobacco. She reports that she does not drink alcohol and does not use drugs.    Family History: No family history on file.    Allergies:   Allergies    Allergen Reactions    Lisinopril Coughing       Medications:    Current Facility-Administered Medications on File Prior to Encounter   Medication Dose Route Frequency Provider Last Rate Last Admin    [COMPLETED] potassium chloride (K-Dur) tab 40 mEq  40 mEq Oral Once Katiana Ochoa MD   40 mEq at 24 0850    [COMPLETED] magnesium oxide (Mag-Ox) tab 800 mg  800 mg Oral Once Katiana Ochoa MD   800 mg at 24 0850    [COMPLETED] magnesium oxide (Mag-Ox) tab 800 mg  800 mg Oral Once Katiana Ochoa MD   800 mg at 24 0856    [COMPLETED] potassium chloride 40 mEq in 250mL sodium chloride 0.9% IVPB premix  40 mEq Intravenous Once Rashaad Nicholson MD 62.5 mL/hr at 24 0912 40 mEq at 24 0912    [COMPLETED] potassium phosphate dibasic 15 mmol in sodium chloride 0.9% 250 mL IVPB  15 mmol Intravenous Once Rashaad Nicholson MD 62.5 mL/hr at 24 0852 15 mmol at 24 0852    Followed by    [COMPLETED] potassium chloride 40 mEq in 250mL sodium chloride 0.9% IVPB premix  40 mEq Intravenous Once Rashaad Nicholson MD 62.5 mL/hr at 24 1815 40 mEq at 24 1815    [COMPLETED] sodium chloride 0.9 % IV bolus 500 mL  500 mL Intravenous Once Luis Sargent  mL/hr at 24 0732 500 mL at 24 0732    [COMPLETED] iopamidol 76% (ISOVUE-370) injection for power injector  70 mL Intravenous ONCE PRN Rashaad Nicholson MD   70 mL at 24 1229    [COMPLETED] magnesium oxide (Mag-Ox) tab 800 mg  800 mg Oral Once Rashaad Nicholson MD   800 mg at 24 0819    [COMPLETED] furosemide (Lasix) 10 mg/mL injection 20 mg  20 mg Intravenous Once Romi Barrow APRN   20 mg at 24 1103    [] metoprolol tartrate (Lopressor) tab 50 mg  50 mg Oral Once PRN Luis Sargent MD        Or    [] metoprolol tartrate (Lopressor) tab 100 mg  100 mg Oral Once PRN Luis Sargent MD        [COMPLETED] metoprolol tartrate (Lopressor) tab 50 mg  50 mg Oral Once Luis Sargent MD   50 mg at  24 2200    Or    [COMPLETED] metoprolol tartrate (Lopressor) tab 100 mg  100 mg Oral Once Luis Sargent MD        [COMPLETED] metoprolol tartrate (Lopressor) tab 50 mg  50 mg Oral Once Luis Sargent MD   50 mg at 24 0518    Or    [COMPLETED] metoprolol tartrate (Lopressor) tab 100 mg  100 mg Oral Once Luis Sargent MD        [] metoprolol tartrate (Lopressor) tab 50 mg  50 mg Oral Once PRN Luis Sargent MD        Or    [] metoprolol tartrate (Lopressor) tab 100 mg  100 mg Oral Once PRN Luis Sargent MD        [COMPLETED] polyethylene glycol-electrolyte solution (Plenvu) 140 g SOLR 480 mL  480 mL Oral BID@0300,1800 Jonel Barrientos MD   480 mL at 24 0200    [COMPLETED] potassium phosphate dibasic 15 mmol in sodium chloride 0.9% 250 mL IVPB  15 mmol Intravenous Once Regan Cruz MD   Stopped at 01/15/24 182    Followed by    [COMPLETED] potassium chloride 20 mEq/100mL IVPB premix 20 mEq  20 mEq Intravenous Once Regan Cruz MD 50 mL/hr at 24 0148 20 mEq at 24 0148    [COMPLETED] magnesium oxide (Mag-Ox) tab 800 mg  800 mg Oral Once Regan Cruz MD   800 mg at 01/15/24 1053    [COMPLETED] sodium chloride 0.9% infusion   Intravenous Once Regan Cruz MD 10 mL/hr at 01/15/24 1820 New Bag at 01/15/24 182    [COMPLETED] polyethylene glycol-electrolyte (Golytely) 236 g oral solution 4,000 mL  4,000 mL Oral Once Tiffany Reynolds PA-C   4,000 mL at 01/15/24 1725    [COMPLETED] potassium phosphate dibasic 15 mmol in sodium chloride 0.9% 250 mL IVPB  15 mmol Intravenous Once Regan Cruz MD 62.5 mL/hr at 24 0905 15 mmol at 24 0905    Followed by    [COMPLETED] potassium chloride 20 mEq/100mL IVPB premix 20 mEq  20 mEq Intravenous Once Regan Cruz MD 50 mL/hr at 24 1323 20 mEq at 24 1323    [COMPLETED] potassium chloride 40 mEq in 250mL sodium chloride 0.9% IVPB premix  40 mEq Intravenous Once Yaz Bernard MD 62.5 mL/hr at  01/13/24 0657 40 mEq at 01/13/24 0657    [COMPLETED] sodium chloride 0.9% infusion   Intravenous Once Regan Cruz MD 10 mL/hr at 01/13/24 0930 New Bag at 01/13/24 0930    [COMPLETED] phytonadione (Aqua-Mephton) 2 mg in sodium chloride 0.9% 50 mL IVPB  2 mg Intravenous Once Augustin Parsons HEENADO 100 mL/hr at 01/13/24 1430 2 mg at 01/13/24 1430    [COMPLETED] furosemide (Lasix) 10 mg/mL injection 20 mg  20 mg Intravenous Once Regan Cruz MD   20 mg at 01/13/24 1554    [COMPLETED] furosemide (Lasix) 10 mg/mL injection 40 mg  40 mg Intravenous Once Ricco Encinas MD   40 mg at 01/13/24 1830    [COMPLETED] sodium chloride 0.9 % IV bolus 500 mL  500 mL Intravenous Once Merritt Queen MD   Stopped at 01/12/24 2237    [COMPLETED] pantoprazole (Protonix) 40 mg in sodium chloride 0.9% PF 10 mL IV push  40 mg Intravenous Once Merritt Queen MD   40 mg at 01/12/24 2157    [COMPLETED] glucagon (GlucaGen) injection 1 mg  1 mg Intramuscular Once Merritt Queen MD   1 mg at 01/12/24 2048    [COMPLETED] piperacillin-tazobactam (Zosyn) 3.375 g in dextrose 5% 100 mL IVPB-ADDV  3.375 g Intravenous Once Merritt Queen MD   Stopped at 01/12/24 2228    [COMPLETED] glucagon (GlucaGen) injection 1 mg  1 mg Intramuscular Once Merritt Queen MD   1 mg at 01/12/24 2107    [COMPLETED] dextrose 50% injection 50 mL  50 mL Intravenous Once Merritt Queen MD   50 mL at 01/12/24 2131    [COMPLETED] iopamidol 76% (ISOVUE-370) injection for power injector  80 mL Intravenous ONCE PRN Merritt Queen MD   80 mL at 01/12/24 2226     Current Outpatient Medications on File Prior to Encounter   Medication Sig Dispense Refill    HYDROcodone-acetaminophen (NORCO)  MG Oral Tab Take 1 tablet by mouth every 6 (six) hours as needed for Pain. 120 tablet 0    AMITRIPTYLINE 25 MG Oral Tab TAKE 1 TABLET(25 MG) BY MOUTH EVERY NIGHT 30 tablet 0    pantoprazole 40 MG Oral Tab EC Take 1 tablet (40 mg total) by mouth 2 (two) times daily before  meals. 60 tablet 0    furosemide 40 MG Oral Tab Take 1 tablet (40 mg total) by mouth daily. 30 tablet 0    methotrexate 2.5 MG Oral Tab TAKE 6 TABLETS BY MOUTH 1 TIME A WEEK 77 tablet 0    alendronate 70 MG Oral Tab Take 1 tablet (70 mg total) by mouth once a week. 12 tablet 0    ENTRESTO 24-26 MG Oral Tab Take 1 tablet by mouth 2 (two) times daily. 180 tablet 0    folic acid 800 MCG Oral Tab Take 1 tablet (800 mcg total) by mouth daily. 90 tablet 1    ferrous sulfate 325 (65 FE) MG Oral Tab EC Take 1 tablet (325 mg total) by mouth daily with breakfast.      carvedilol 25 MG Oral Tab Take 1 tablet (25 mg total) by mouth 2 (two) times daily. 60 tablet 0       Review of Systems:   A comprehensive 14 point review of systems was completed.    Pertinent positives and negatives noted in the HPI.    Objective:     /77   Pulse 78   Temp 97.3 °F (36.3 °C) (Oral)   Resp 19   Wt 135 lb (61.2 kg)   SpO2 99%   BMI 22.47 kg/m²   General: No acute distress.  Alert   HEENT: Normocephalic atraumatic. Moist mucous membranes. EOM-I. PERRLA. Anicteric.  Neck: No lymphadenopathy. No JVD. No carotid bruits.  Respiratory: Crackles heard posteriorly in the bases no wheezes. No rhonchi.    Cardiovascular: Regular rate and rhythm. No murmurs, rubs or gallops. Equal pulses.   Chest and Back: No tenderness or deformity.  Abdomen: Soft, nontender, nondistended.  Positive bowel sounds. No rebound, guarding or organomegaly.  Neurologic: No focal neurological deficits. CNII-XII grossly intact.  Musculoskeletal: Moves all extremities.  Extremities: No edema or cyanosis.  Psychiatric: Appropriate mood and affect.  Integument: No rashes or lesions.   Skin Moisture: Warm and Dry, Skin Color: Normal   Peripheral Radial Pulse: Right 1+ or Left 1+      Results:    Labs:    Selected labs - last 24 hours:  Endo  Lytes  Renal   Glu 55  Na 137 Ca 8.8  BUN 17   POC Gluc  -  K 4.3 PO4 -  Cr 1.47   A1c -  Cl 101 Mg -  eGFR 37   TSH -  CO2 19.0          LFT  CBC  Other   AST -  WBC 4.2  PTT - Procal -   ALT -  Hb 9.5  INR - CRP -   APk -  Hct 29.8  Trop - D dim -   T desirae -  .0  pBNP -  BNP -  18,762 Ferritin  -   Prot -    CK  - Lactate  -   Alb -    LDL  - COVID  Not Detected       Imaging: Imaging data reviewed in Epic.    Assessment & Plan:    # 78-year-old female comes emergency room with shortness of breath found to have a pulse oximetry of 90 on room air placed on oxygen with relief and given Lasix.  Laboratory revealed elevated BNP x-ray did not appear wet but clinically she was.    Acute on chronic combined CHF  Severe tricuspid valve regurgitation  Bioprosthetic mitral valve  NICM status post ICD in September 2023  Recent echo showed ejection fraction 37% with grade 4 tricuspid regurg  Cardiology is now on consult  Lasix given in the emergency room and started on Bumex 1 mg twice daily  Continue Entresto and carvedilol  Paroxysmal atrial fibrillation   Hold oral anticoagulation given recent GI bleed  Currently rate controlled  Still considering outpatient Watchman procedure given her recurrent GI bleeds  Chronic kidney injury stage III  Baseline creatinine is about 1.3  Current creatinine 1.47  Follow urine output  Lower extremity edema  Appears much improved  Perceived hypoxic respiratory failure  Pulse oximetry is normal on room air  Patient is currently on Lasix 40 mg IV x 1 then Bumex 1 mg twice daily  Recent GI bleed secondary to AVMs  Chronic anemia    Quality:  DVT Mechanical Prophylaxis:        DVT Pharmacologic Prophylaxis   Medication   None                Code Status: Full Code  Hooks: No urinary catheter in place  Hooks Duration (in days):   Central line:    CELIO:     Plan of care discussed with patient and her family including  and daughter    Shashi Sanchez MD  2/2/2024  45 minutes spent on this admission - examining patient, obtaining history, reviewing previous medical records, going over test results/imaging and  discussing plan of care. All questions answered.

## 2024-02-03 NOTE — ED INITIAL ASSESSMENT (HPI)
Via [facility] From home. Patient fell next to her bed due to feeling short of breath. Denies any injury from the fall, but states \" I can't breathe.\" Patient was 90% on room air with bilateral wheezing. Neb given en route. Patient arrives on 6L02.   Recent hospitalization. + cough.    Medics report pacemaker, but noted some bradycardia

## 2024-02-03 NOTE — ED PROVIDER NOTES
Patient Seen in: [facility] Emergency Department      History     Chief Complaint   Patient presents with    Shortness Of Breath    Fall     Stated Complaint: sob    Subjective:   HPI    78-year-old female presents for evaluation for shortness of breath.  Patient was recently admitted to the hospital.  Daughter reports that when she was discharged home she still had swelling to her legs and was very weak and having difficulty ambulating.  She had worsening difficulty ambulating and worsening shortness of breath.  She has been compliant with her diuretics.  She has had a nonproductive cough.  She reports lower extremity swelling.  She denies any fevers, chills, chest pain.    Objective:   Past Medical History:   Diagnosis Date    Anemia     Arrhythmia     Arthritis     Deep vein thrombosis (HCC)     Essential hypertension     High blood pressure     History of blood transfusion     Seizure disorder (HCC)     Seizures (HCC)               Past Surgical History:   Procedure Laterality Date    COLONOSCOPY N/A 01/17/2024    Dr. Rios    COLONOSCOPY N/A 1/17/2024    Procedure: COLONOSCOPY;  Surgeon: Zoraida Rios MD;  Location: [facility] ENDOSCOPY    EGD N/A 01/17/2024    Dr. Rios    OTHER SURGICAL HISTORY  2017    Heart Surgery     OTHER SURGICAL HISTORY  2020    Bilateral shoulder replacement                 Social History     Socioeconomic History    Marital status:    Tobacco Use    Smoking status: Former     Packs/day: .25     Types: Cigarettes     Quit date: 2014     Years since quitting: 10.0    Smokeless tobacco: Never   Vaping Use    Vaping Use: Never used   Substance and Sexual Activity    Alcohol use: Never    Drug use: Never     Social Determinants of Health     Financial Resource Strain: Low Risk  (1/23/2024)    Financial Resource Strain     Med Affordability: No   Food Insecurity: No Food Insecurity (1/13/2024)    Food Insecurity     Food Insecurity: Never true   Transportation Needs: No Transportation  Needs (2024)    Transportation Needs     Lack of Transportation: No   Housing Stability: Low Risk  (2024)    Housing Stability     Housing Instability: No              Review of Systems    Positive for stated complaint: sob  Other systems are as noted in HPI.  Constitutional and vital signs reviewed.      All other systems reviewed and negative except as noted above.    Physical Exam     ED Triage Vitals   BP 24 (!) 131/108   Pulse 24 75   Resp 24 (!) 36   Temp 24 97.3 °F (36.3 °C)   Temp src 24 Oral   SpO2 24 98 %   O2 Device 24 Nasal cannula       Current:/75   Pulse 79   Temp 97.3 °F (36.3 °C) (Oral)   Resp 18   Wt 61.2 kg   SpO2 99%   BMI 22.47 kg/m²         Physical Exam  Vitals and nursing note reviewed.   Constitutional:       Appearance: Normal appearance.   HENT:      Head: Normocephalic and atraumatic.   Cardiovascular:      Rate and Rhythm: Normal rate and regular rhythm.      Pulses: Normal pulses.           Radial pulses are 2+ on the right side and 2+ on the left side.      Heart sounds: Murmur heard.   Pulmonary:      Effort: Pulmonary effort is normal.      Breath sounds: Rales present.   Musculoskeletal:         General: Normal range of motion.      Cervical back: Normal range of motion.      Right lower le+ Pitting Edema present.      Left lower le+ Pitting Edema present.   Skin:     General: Skin is warm and dry.   Neurological:      General: No focal deficit present.      Mental Status: She is alert.               ED Course     Labs Reviewed   BASIC METABOLIC PANEL (8) - Abnormal; Notable for the following components:       Result Value    Glucose 55 (*)     CO2 19.0 (*)     Creatinine 1.47 (*)     eGFR-Cr 37 (*)     All other components within normal limits   BNP (B TYPE NATRIURETIC PEPTIDE) - Abnormal; Notable for the following components:    Beta Natriuretic Peptide 18,762 (*)     All  other components within normal limits   RBC MORPHOLOGY SCAN - Abnormal; Notable for the following components:    RBC Morphology See morphology below (*)     Platelet Morphology See morphology below (*)     Macrocytosis 2+ (*)     Giant platelets Few (*)     All other components within normal limits   CBC W/ DIFFERENTIAL - Abnormal; Notable for the following components:    RBC 3.13 (*)     HGB 9.5 (*)     HCT 29.8 (*)     RDW-SD 61.5 (*)     RDW 19.9 (*)     Lymphocyte Absolute 0.30 (*)     Monocyte Absolute 0.04 (*)     All other components within normal limits   TROPONIN I HIGH SENSITIVITY - Normal   POCT GLUCOSE - Normal   SARS-COV-2/FLU A AND B/RSV BY PCR (GENEXPERT) - Normal    Narrative:     This test is intended for the qualitative detection and differentiation of SARS-CoV-2, influenza A, influenza B, and respiratory syncytial virus (RSV) viral RNA in nasopharyngeal or nares swabs from individuals suspected of respiratory viral infection consistent with COVID-19 by their healthcare provider. Signs and symptoms of respiratory viral infection due to SARS-CoV-2, influenza, and RSV can be similar.    Test performed using the Xpert Xpress SARS-CoV-2/FLU/RSV (real time RT-PCR)  assay on the GeneXpert instrument, [employer], [place] [address].   This test is being used under the Food and Drug Administration's Emergency Use Authorization.    The authorized Fact Sheet for Healthcare Providers for this assay is available upon request from the laboratory.   CBC WITH DIFFERENTIAL WITH PLATELET    Narrative:     The following orders were created for panel order CBC With Differential With Platelet.  Procedure                               Abnormality         Status                     ---------                               -----------         ------                     CBC W/ DIFFERENTIAL[[id number]]          Abnormal            Final result                 Please view results for these tests on the individual orders.    RAINBOW DRAW LAVENDER   RAINBOW DRAW LIGHT GREEN   RAINBOW DRAW BLUE     EKG    Rate, intervals and axes as noted on EKG Report.  Rate: 74  Rhythm: paced  Reading: paced, no stemi, interpreted by myself.              ED Course as of 02/02/24 2355  ------------------------------------------------------------  Time: 02/02 2221  Value: XR CHEST AP PORTABLE  (CPT=71045)  Comment: Per my independent interpretation, patient's CXR demonstrates no pneumonia.                [facility]        Admission disposition: 2/2/2024 11:46 PM                                        Medical Decision Making  Differential diagnosis includes congestive heart failure, pneumonia, RSV, influenza, COVID-19, other infectious process, renal failure, etc.    Patient's CBC and chemistry were unremarkable.  EKG without acute changes.  Chest x-ray relatively unremarkable.  On exam patient appears to be volume overloaded.  Patient's BNP is markedly elevated.  Patient will be started on Lasix.  Cardiology is on consult.    Rhythm Strip: Rate 75 AV paced The cardiac monitor was ordered secondary to the patient's congestive heart failure.     Complicating factors: The patient  has a past medical history of Anemia, Arrhythmia, Arthritis, Deep vein thrombosis (HCC), Essential hypertension, High blood pressure, History of blood transfusion, Seizure disorder (HCC), and Seizures (HCC). and  has a past surgical history that includes other surgical history (2017); other surgical history (2020); egd (N/A, 01/17/2024); colonoscopy (N/A, 01/17/2024); and colonoscopy (N/A, 1/17/2024). that contribute to the medical complexity of this ED evaluation.     Medical Record Review: I personally reviewed available prior medical records for any recent pertinent discharge summaries, testing, and procedures, and reviewed those reports.      Problems Addressed:  Acute on chronic congestive heart failure, unspecified heart failure type (HCC): acute illness or injury with systemic  symptoms    Amount and/or Complexity of Data Reviewed  Independent Historian:      Details: Daughter  External Data Reviewed: notes.     Details: Patient's discharge summary was reviewed.  Patient was admitted from January 12 January 20.  Patient was admitted with melena, upper GI bleed and pancytopenia from sepsis as well as some CHF.  Patient's EF was 35 to 40% on her echocardiogram.  Labs: ordered. Decision-making details documented in ED Course.  Radiology: ordered and independent interpretation performed. Decision-making details documented in ED Course.  ECG/medicine tests: ordered and independent interpretation performed. Decision-making details documented in ED Course.  Discussion of management or test interpretation with external provider(s): Discussed with YOLIE Husain who agrees with Lasix and will have cardiology see the patient. Dr. Mccoy accepts admission.     Risk  Decision regarding hospitalization.        Disposition and Plan     Clinical Impression:  1. Acute on chronic congestive heart failure, unspecified heart failure type (HCC)         Disposition:  Admit  2/2/2024 11:46 pm    Follow-up:  No follow-up provider specified.  We recommend that you schedule follow up care with a primary care provider within the next three months to obtain basic health screening including reassessment of your blood pressure.      Medications Prescribed:  Current Discharge Medication List                            Hospital Problems       Present on Admission  Date Reviewed: 12/26/2023            ICD-10-CM Noted POA    * (Principal) Acute on chronic congestive heart failure, unspecified heart failure type (HCC) I50.9 5/3/2023 Yes

## 2024-02-03 NOTE — PLAN OF CARE
A&Ox4. Weaned from oxygen, now room air. Maximum assist. Purewick in place. Plan is for diuresis and PT eval.   Problem: Patient Centered Care  Goal: Patient preferences are identified and integrated in the patient's plan of care  Description: Interventions:  - What would you like us to know as we care for you? I live with my  and two grandchildren  - Provide timely, complete, and accurate information to patient/family  - Incorporate patient and family knowledge, values, beliefs, and cultural backgrounds into the planning and delivery of care  - Encourage patient/family to participate in care and decision-making at the level they choose  - Honor patient and family perspectives and choices  Outcome: Progressing     Problem: CARDIOVASCULAR - ADULT  Goal: Maintains optimal cardiac output and hemodynamic stability  Description: INTERVENTIONS:  - Monitor vital signs, rhythm, and trends  - Monitor for bleeding, hypotension and signs of decreased cardiac output  - Evaluate effectiveness of vasoactive medications to optimize hemodynamic stability  - Monitor arterial and/or venous puncture sites for bleeding and/or hematoma  - Assess quality of pulses, skin color and temperature  - Assess for signs of decreased coronary artery perfusion - ex. Angina  - Evaluate fluid balance, assess for edema, trend weights  Outcome: Progressing  Goal: Absence of cardiac arrhythmias or at baseline  Description: INTERVENTIONS:  - Continuous cardiac monitoring, monitor vital signs, obtain 12 lead EKG if indicated  - Evaluate effectiveness of antiarrhythmic and heart rate control medications as ordered  - Initiate emergency measures for life threatening arrhythmias  - Monitor electrolytes and administer replacement therapy as ordered  Outcome: Progressing     Problem: RESPIRATORY - ADULT  Goal: Achieves optimal ventilation and oxygenation  Description: INTERVENTIONS:  - Assess for changes in respiratory status  - Assess for changes in  mentation and behavior  - Position to facilitate oxygenation and minimize respiratory effort  - Oxygen supplementation based on oxygen saturation or ABGs  - Provide Smoking Cessation handout, if applicable  - Encourage broncho-pulmonary hygiene including cough, deep breathe, Incentive Spirometry  - Assess the need for suctioning and perform as needed  - Assess and instruct to report SOB or any respiratory difficulty  - Respiratory Therapy support as indicated  - Manage/alleviate anxiety  - Monitor for signs/symptoms of CO2 retention  Outcome: Progressing     Problem: GENITOURINARY - ADULT  Goal: Absence of urinary retention  Description: INTERVENTIONS:  - Assess patient’s ability to void and empty bladder  - Monitor intake/output and perform bladder scan as needed  - Follow urinary retention protocol/standard of care  - Consider collaborating with pharmacy to review patient's medication profile  - Implement strategies to promote bladder emptying  Outcome: Progressing     Problem: Impaired Functional Mobility  Goal: Achieve highest/safest level of mobility/gait  Description: Interventions:  - Assess patient's functional ability and stability  - Promote increasing activity/tolerance for mobility and gait  - Educate and engage patient/family in tolerated activity level and precautions  - Recommend use of chair position in bed 3 times per day  Outcome: Not Progressing

## 2024-02-04 LAB
ANION GAP SERPL CALC-SCNC: 7 MMOL/L (ref 0–18)
BASOPHILS # BLD AUTO: 0.01 X10(3) UL (ref 0–0.2)
BASOPHILS NFR BLD AUTO: 0.3 %
BUN BLD-MCNC: 19 MG/DL (ref 9–23)
BUN/CREAT SERPL: 14.2 (ref 10–20)
CALCIUM BLD-MCNC: 8.3 MG/DL (ref 8.7–10.4)
CHLORIDE SERPL-SCNC: 106 MMOL/L (ref 98–112)
CO2 SERPL-SCNC: 25 MMOL/L (ref 21–32)
CREAT BLD-MCNC: 1.34 MG/DL
DEPRECATED RDW RBC AUTO: 59 FL (ref 35.1–46.3)
EGFRCR SERPLBLD CKD-EPI 2021: 41 ML/MIN/1.73M2 (ref 60–?)
EOSINOPHIL # BLD AUTO: 0.02 X10(3) UL (ref 0–0.7)
EOSINOPHIL NFR BLD AUTO: 0.5 %
ERYTHROCYTE [DISTWIDTH] IN BLOOD BY AUTOMATED COUNT: 19.1 % (ref 11–15)
GLUCOSE BLD-MCNC: 86 MG/DL (ref 70–99)
HCT VFR BLD AUTO: 25.8 %
HGB BLD-MCNC: 8.4 G/DL
IMM GRANULOCYTES # BLD AUTO: 0.04 X10(3) UL (ref 0–1)
IMM GRANULOCYTES NFR BLD: 1.1 %
LYMPHOCYTES # BLD AUTO: 0.44 X10(3) UL (ref 1–4)
LYMPHOCYTES NFR BLD AUTO: 11.8 %
MAGNESIUM SERPL-MCNC: 1.6 MG/DL (ref 1.6–2.6)
MCH RBC QN AUTO: 30.1 PG (ref 26–34)
MCHC RBC AUTO-ENTMCNC: 32.6 G/DL (ref 31–37)
MCV RBC AUTO: 92.5 FL
MONOCYTES # BLD AUTO: 0.03 X10(3) UL (ref 0.1–1)
MONOCYTES NFR BLD AUTO: 0.8 %
NEUTROPHILS # BLD AUTO: 3.19 X10 (3) UL (ref 1.5–7.7)
NEUTROPHILS # BLD AUTO: 3.19 X10(3) UL (ref 1.5–7.7)
NEUTROPHILS NFR BLD AUTO: 85.5 %
OSMOLALITY SERPL CALC.SUM OF ELEC: 288 MOSM/KG (ref 275–295)
PHOSPHATE SERPL-MCNC: 2 MG/DL (ref 2.4–5.1)
PLATELET # BLD AUTO: 97 10(3)UL (ref 150–450)
POTASSIUM SERPL-SCNC: 4.2 MMOL/L (ref 3.5–5.1)
POTASSIUM SERPL-SCNC: 4.4 MMOL/L (ref 3.5–5.1)
RBC # BLD AUTO: 2.79 X10(6)UL
SODIUM SERPL-SCNC: 138 MMOL/L (ref 136–145)
T PALLIDUM AB SER QL IA: NONREACTIVE
WBC # BLD AUTO: 3.7 X10(3) UL (ref 4–11)

## 2024-02-04 PROCEDURE — 99233 SBSQ HOSP IP/OBS HIGH 50: CPT | Performed by: HOSPITALIST

## 2024-02-04 RX ORDER — MAGNESIUM OXIDE 400 MG/1
400 TABLET ORAL ONCE
Status: COMPLETED | OUTPATIENT
Start: 2024-02-04 | End: 2024-02-04

## 2024-02-04 NOTE — OCCUPATIONAL THERAPY NOTE
OCCUPATIONAL THERAPY EVALUATION - INPATIENT     Room Number: 332/332-A  Evaluation Date: 2/4/2024  Type of Evaluation: Initial  Presenting Problem: acute on chronic CHF    Physician Order: IP Consult to Occupational Therapy  Reason for Therapy: ADL/IADL Dysfunction and Discharge Planning    OCCUPATIONAL THERAPY ASSESSMENT     Orders received, chart review complete. RN approved patient participation. Pt received in bed and left in chair at end of session.  No family at bedside.     Patient is a 78 year old female admitted 2/2/2024 for acute on chronic CHF, fall at home, feeling SOB - pt on room air, no SOB VSS.  In this OT evaluation patient presents with the following impairments: endurance, pain, generalized weakness, balance, functional reach.  These deficits manifest functionally while performing ADLs and fx mobility.   The patient is below baseline and would benefit from skilled inpatient OT to address the above deficits, maximizing patient's ability to return to prior level of function.    The patient's Approx Degree of Impairment: 74.7% has been calculated based on documentation in the [facility] '6 clicks' Inpatient Daily Activity Short Form.  Research supports that patients with this level of impairment may benefit from CÉSAR.     DISCHARGE RECOMMENDATIONS  OT Discharge Recommendations: Sub-acute rehabilitation  OT Device Recommendations: Shower chair; Raised toilet seat (built up utensils)    PLAN  OT Treatment Plan: Balance activities;Energy conservation/work simplification techniques;ADL training;Functional transfer training;UE strengthening/ROM;Endurance training;Patient/Family education;Patient/Family training;Equipment eval/education;Compensatory technique education       OCCUPATIONAL THERAPY MEDICAL/SOCIAL HISTORY   Problem List  Principal Problem:    Acute on chronic congestive heart failure, unspecified heart failure type (HCC)    HOME SITUATION  Type of Home: House  Home Layout: One level  Lives With:  Spouse  Toilet and Equipment: Standard height toilet  Shower/Tub and Equipment: Tub-shower combo  Hand Dominance: Right  Drives: No  Patient Regularly Uses: Glasses  Use of Assistive Device(s): RW    Prior Level of [place]: Patient lives home with spouse, she requires assist with ADLs and IADLs 2/2 severe RA - reports when she ambulates short distances he typically holds onto spouse. Several falls recently.     SUBJECTIVE  Thank you for your help    OCCUPATIONAL THERAPY EXAMINATION    OBJECTIVE  Precautions: Bed/chair alarm; Cardiac  Fall Risk: High fall risk    PAIN ASSESSMENT  Rating: Non-verbal signs of pain/discomfort observed  Location: all over, especially neck  Management Techniques: Relaxation; Repositioning    ACTIVITY TOLERANCE  Pulse: 64  Heart Rate Source: Monitor    COGNITION  WFL    VISION  Wears glasses    Communication: Receptive and expressive language intact    Behavioral/Emotional/Social:  Patient was pleasant and cooperative    RANGE OF MOTION   BUE ROM limited 2/2 RA  Pt with B wrist and digit contractures in B hands  B shoulders able to reach mouth for self feeding and brushing teeth    STRENGTH ASSESSMENT  Generalized weakness    COORDINATION  Gross Motor: impaired  Fine Motor: impaired    ACTIVITIES OF DAILY LIVING ASSESSMENT  AM-PAC ‘6-Clicks’ Inpatient Daily Activity Short Form  How much help from another person does the patient currently need…  -   Putting on and taking off regular lower body clothing?: Total  -   Bathing (including washing, rinsing, drying)?: A Lot  -   Toileting, which includes using toilet, bedpan or urinal? : Total  -   Putting on and taking off regular upper body clothing?: A Lot  -   Taking care of personal grooming such as brushing teeth?: A Lot  -   Eating meals?: A Lot    AM-PAC Score:  Score: 10  Approx Degree of Impairment: 74.7%  Standardized Score (AM-PAC Scale): 27.31  CMS Modifier (G-Code): CL    FUNCTIONAL TRANSFER ASSESSMENT  SPT with max a x1 - 2nd  assist for safety    BED MOBILITY  Supine to Sit : Maximum Assist    BALANCE ASSESSMENT  Static Sitting: Contact Guard Assist    FUNCTIONAL ADL ASSESSMENT  Eating: Moderate Assist  Grooming Seated: Moderate Assist  Bathing Seated: Maximum Assist  UB Dressing Seated: Maximum Assist  LB Dressing Seated: Dependent  Toileting Seated: Dependent    Skilled Therapy Provided:  Transfer training  Facilitated OOB activity and ADL engagement this session (difficulty holding toothbruch, uses B hand approach to compensate for RA deformities in hands)  Educated on built up utensils and DME    EDUCATION PROVIDED  Educated pt on role of OT in acute care setting, activity pacing, compensatory strategies, pursed lip breathing, transfer training, physiological benefits of functional mobility/OOB activity and POC - pt verbalized understanding.       Patient End of Session: Up in chair;Needs met;RN aware of session/findings;Call light within reach;All patient questions and concerns addressed  OT Goals  Patients self stated goal is: did not state      Patient will complete functional transfer with min a   Comment:     Patient will complete toileting with mod a   Comment:     Patient will tolerate standing for 1-2 minutes in prep for adls with min a   Comment:               Goals  on:   Frequency: 3x week    Trina SCHAEFFER/L  [facility]       Patient Evaluation Complexity Level:   Occupational Profile/Medical History MODERATE - Expanded review of history including review of medical or therapy record   Specific performance deficits impacting engagement in ADL/IADL MODERATE  3 - 5 performance deficits   Client Assessment/Performance Deficits MODERATE - Comorbidities and min to mod modifications of tasks    Clinical Decision Making MODERATE - Analysis of occupational profile, detailed assessments, several treatment options    Overall Complexity MODERATE       Self-Care Home Management: 15  minutes

## 2024-02-04 NOTE — PROGRESS NOTES
[facility]    Progress Note    Margaret Silverio Patient Status:  Inpatient    3/14/1945 MRN [id number]   Location [facility] 3W/SW Attending Scott Mendez,*   Hosp Day # 1 PCP Johnathon Rodriguez DO     Chief Complaint: hurt all over    Subjective:     Constitutional:  Positive for activity change, appetite change and fatigue.   HENT:  Positive for congestion.    Respiratory:  Negative for cough.    Cardiovascular:  Positive for leg swelling.   Gastrointestinal:  Negative for abdominal pain and diarrhea.   Genitourinary:  Negative for difficulty urinating.   Musculoskeletal:  Positive for back pain, joint swelling, joint pain and neck pain.   Neurological:  Positive for weakness.   Psychiatric/Behavioral:  Positive for sleep disturbance and depressed mood. Negative for confusion and decreased concentration. The patient is not nervous/anxious.        Objective:   Blood pressure 105/60, pulse 60, temperature 97.3 °F (36.3 °C), temperature source Axillary, resp. rate 16, weight 149 lb 9.6 oz (67.9 kg), SpO2 96%.  Physical Exam  Vitals and nursing note reviewed.   Constitutional:       General: She is not in acute distress.     Appearance: She is ill-appearing. She is not toxic-appearing or diaphoretic.   HENT:      Head: Normocephalic and atraumatic.   Cardiovascular:      Rate and Rhythm: Normal rate and regular rhythm.      Heart sounds: Murmur heard.   Pulmonary:      Effort: Pulmonary effort is normal.      Breath sounds: Wheezing, rhonchi and rales present.   Abdominal:      General: Bowel sounds are normal.      Palpations: Abdomen is soft.      Tenderness: There is no guarding.   Musculoskeletal:         General: Swelling and tenderness present.   Skin:     General: Skin is warm and dry.      Capillary Refill: Capillary refill takes less than 2 seconds.   Neurological:      General: No focal deficit present.      Mental Status: She is alert and oriented to person, place,  and time.   Psychiatric:         Behavior: Behavior normal.         Results:   Lab Results   Component Value Date    WBC 3.7 (L) 02/04/2024    HGB 8.4 (L) 02/04/2024    HCT 25.8 (L) 02/04/2024    PLT 97.0 (L) 02/04/2024    CREATSERUM 1.34 (H) 02/04/2024    BUN 19 02/04/2024     02/04/2024    K 4.4 02/04/2024     02/04/2024    CO2 25.0 02/04/2024    GLU 86 02/04/2024    CA 8.3 (L) 02/04/2024    ALB 3.2 01/19/2024    ALKPHO 110 01/13/2024    BILT 1.6 (H) 01/13/2024    TP 5.6 (L) 01/13/2024    AST 38 (H) 01/13/2024    ALT 15 01/13/2024    PTT 42.1 (H) 01/15/2024    INR 1.60 (H) 01/15/2024    T4F 1.2 02/03/2024    TSH 10.681 (H) 02/03/2024    LIP 32 01/13/2024    MG 1.6 02/04/2024    PHOS 2.0 (L) 02/04/2024    TROPHS 34 02/02/2024    B12 >2,000 (H) 02/03/2024       XR LUMBAR SPINE (MIN 4 VIEWS) (CPT=72110)    Result Date: 2/3/2024  CONCLUSION:  1. No radiographically visible acute osseous injury of the lumbar spine. 2. Mild-to-moderate multilevel lumbar spine degenerative changes. 3. Mild levoscoliosis of the lumbar spine. 4. Demineralization. 5. Lesser incidental findings as above.   Dictated by (CST): Aleks Diana MD on 2/03/2024 at 8:25 PM     Finalized by (CST): Aleks Diana MD on 2/03/2024 at 8:27 PM          XR SHOULDER, COMPLETE (MIN 2 VIEWS), LEFT (CPT=73030)    Result Date: 2/3/2024  CONCLUSION:  1. No radiographically visible acute osseous injury of the left shoulder. 2. Reverse left shoulder arthroplasty, which appears radiographically uncomplicated. 3. Demineralization. 4. Partially imaged left chest wall pacemaker.   Dictated by (CST): Aleks Diana MD on 2/03/2024 at 8:24 PM     Finalized by (CST): Aleks Diana MD on 2/03/2024 at 8:25 PM          XR SHOULDER, COMPLETE (MIN 2 VIEWS), RIGHT (CPT=73030)    Result Date: 2/3/2024  CONCLUSION:  1. No radiographically visible acute osseous injury of the right shoulder. 2. Postsurgical changes of right reverse shoulder arthroplasty with  radiographically uncomplicated hardware. 3. Mild acromioclavicular joint osteoarthritis. 4. Partially imaged hazy opacity at the right lung base, which is better assessed on recent prior chest radiograph.   Dictated by (CST): Aleks Diana MD on 2/03/2024 at 8:22 PM     Finalized by (CST): Aleks Diana MD on 2/03/2024 at 8:24 PM          XR CHEST AP PORTABLE  (CPT=71045)    Result Date: 2/2/2024  CONCLUSION:   No change  Chronic right basilar effusion and round atelectasis  No pulmonary edema    Dictated by (CST): Rey Jackson MD on 2/02/2024 at 9:10 PM     Finalized by (CST): Rey Jackson MD on 2/02/2024 at 9:13 PM         EKG 12 Lead    Result Date: 2/3/2024  attial flutter Right axis deviation Low voltage QRS, consider pulmonary disease, pericardial effusion, or normal variant Cannot rule out Anteroseptal infarct , age undetermined ST & T wave abnormality, consider inferolateral ischemia Abnormal ECG When compared with ECG of 12-JAN-2024 22:00, Junctional rhythm has replaced Electronic ventricular pacemaker Confirmed by RIZWANA FRANKLIN ([id number]) on 2/3/2024 7:07:30 AM     Assessment & Plan:   Acute on chronic combined CHF  Severe tricuspid valve regurgitation  Bioprosthetic mitral valve  NICM status post ICD in September 2023  Recent echo showed ejection fraction 37% with grade 4 tricuspid regurg  Cardiology is now on consult  Lasix given in the emergency room and started on Bumex 1 mg twice daily diuresis as per cards; legs swollen; cxr no chf  Continue Entresto and carvedilol  Paroxysmal atrial fibrillation   Hold oral anticoagulation given recent GI bleed  Currently rate controlled  Still considering outpatient Watchman procedure given her recurrent GI bleeds  Chronic kidney injury stage III  Baseline creatinine is about 1.3  Current creatinine 1.34  Follow urine output  Lower extremity edema  Appears much improved still present  Perceived  acute hypoxic respiratory failure unusual discoid atelectasis on  cxr; will check procalc and cultures recheck cxr; poss pneumonia as pt is \"achy and weak all over\"  Start abx if fever or obj start atelectasis tx; check speech  Pulse oximetry is normal on room air  Patient is currently on Lasix 40 mg IV x 1 then Bumex 1 mg twice daily  Recent GI bleed secondary to AVMs  Chronic anemia  Severe shoulder pain after falls and trying to get pt up; check xrays; pt eval;meds     Quality:  DVT Mechanical Prophylaxis: scd       Dispo: to [facility] when [facility]l complete          Patient will require inpatient services that will reasonably be expected to span two midnight's based on the clinical documentation in H+P.   Based on patients current state of illness, I anticipate that, after discharge, patient will require TBD.  Complex mdm; coordinating care with nurse and counseling pt and with her permission her  in room about pain/chf/poss pneumonia??    CYNTHIA GAN MD

## 2024-02-04 NOTE — PROGRESS NOTES
[facility]    Progress Note    Margaret Silverio Patient Status:  Inpatient    3/14/1945 MRN [id number]   Location [facility] 3W/SW Attending Scott Mendez,*   Hosp Day # 0 PCP Johnathon Rodriguez DO     Chief Complaint: hurt all over    Subjective:     Constitutional:  Positive for activity change and appetite change.   HENT:  Positive for congestion.    Respiratory:  Negative for cough.    Cardiovascular:  Negative for leg swelling.   Gastrointestinal:  Negative for abdominal pain and diarrhea.   Genitourinary:  Negative for difficulty urinating.   Musculoskeletal:  Positive for joint swelling and joint pain.   Neurological:  Positive for weakness.   Psychiatric/Behavioral:  Negative for confusion and decreased concentration. The patient is not nervous/anxious.        Objective:   Blood pressure 114/61, pulse 60, temperature 97.5 °F (36.4 °C), temperature source Oral, resp. rate 18, weight 149 lb 14.4 oz (68 kg), SpO2 97%.  Physical Exam  Vitals and nursing note reviewed.   Constitutional:       General: She is not in acute distress.     Appearance: She is ill-appearing. She is not toxic-appearing or diaphoretic.   HENT:      Head: Normocephalic and atraumatic.   Cardiovascular:      Rate and Rhythm: Normal rate and regular rhythm.      Heart sounds: Murmur heard.   Pulmonary:      Effort: Pulmonary effort is normal.      Breath sounds: Wheezing, rhonchi and rales present.   Abdominal:      General: Bowel sounds are normal.      Palpations: Abdomen is soft.   Musculoskeletal:         General: Tenderness present.   Skin:     General: Skin is warm and dry.      Capillary Refill: Capillary refill takes less than 2 seconds.   Neurological:      General: No focal deficit present.      Mental Status: She is alert and oriented to person, place, and time.   Psychiatric:         Behavior: Behavior normal.         Results:   Lab Results   Component Value Date    WBC 4.0 2024     HGB 8.4 (L) 02/03/2024    HCT 25.5 (L) 02/03/2024    .0 (L) 02/03/2024    CREATSERUM 1.48 (H) 02/03/2024    BUN 18 02/03/2024     02/03/2024    K 3.6 02/03/2024     02/03/2024    CO2 22.0 02/03/2024     (H) 02/03/2024    CA 8.3 (L) 02/03/2024    ALB 3.2 01/19/2024    ALKPHO 110 01/13/2024    BILT 1.6 (H) 01/13/2024    TP 5.6 (L) 01/13/2024    AST 38 (H) 01/13/2024    ALT 15 01/13/2024    PTT 42.1 (H) 01/15/2024    INR 1.60 (H) 01/15/2024    T4F 1.2 01/12/2024    TSH 19.180 (H) 01/12/2024    LIP 32 01/13/2024    MG 1.6 02/03/2024    PHOS 2.7 01/19/2024    TROPHS 34 02/02/2024    B12 >2,000 (H) 01/14/2024       XR CHEST AP PORTABLE  (CPT=71045)    Result Date: 2/2/2024  CONCLUSION:   No change  Chronic right basilar effusion and round atelectasis  No pulmonary edema    Dictated by (CST): Rey Jackson MD on 2/02/2024 at 9:10 PM     Finalized by (CST): Rey Jackson MD on 2/02/2024 at 9:13 PM         EKG 12 Lead    Result Date: 2/3/2024  attial flutter Right axis deviation Low voltage QRS, consider pulmonary disease, pericardial effusion, or normal variant Cannot rule out Anteroseptal infarct , age undetermined ST & T wave abnormality, consider inferolateral ischemia Abnormal ECG When compared with ECG of 12-JAN-2024 22:00, Junctional rhythm has replaced Electronic ventricular pacemaker Confirmed by RIZWANA FRANKLIN ([id number]) on 2/3/2024 7:07:30 AM     Assessment & Plan:   Acute on chronic combined CHF  Severe tricuspid valve regurgitation  Bioprosthetic mitral valve  NICM status post ICD in September 2023  Recent echo showed ejection fraction 37% with grade 4 tricuspid regurg  Cardiology is now on consult  Lasix given in the emergency room and started on Bumex 1 mg twice daily diuresis as per cards  Continue Entresto and carvedilol  Paroxysmal atrial fibrillation   Hold oral anticoagulation given recent GI bleed  Currently rate controlled  Still considering outpatient Watchman procedure given her  recurrent GI bleeds  Chronic kidney injury stage III  Baseline creatinine is about 1.3  Current creatinine 1.47  Follow urine output  Lower extremity edema  Appears much improved  Perceived hypoxic respiratory failure  Pulse oximetry is normal on room air  Patient is currently on Lasix 40 mg IV x 1 then Bumex 1 mg twice daily  Recent GI bleed secondary to AVMs  Chronic anemia  Severe shoulder pain after falls and trying to get pt up; check xrays; pt eval;meds     Quality:  DVT Mechanical Prophylaxis:        DVT Pharmacologic Prophylaxis   Medication       Patient will require inpatient services that will reasonably be expected to span two midnight's based on the clinical documentation in H+P.   Based on patients current state of illness, I anticipate that, after discharge, patient will require TBD.  Complex mdm; coordinating care with nurse and counseling pt and with her permission her  in room about pain/chf    CYNTHIA GAN MD

## 2024-02-04 NOTE — PLAN OF CARE
Patient reports no shortness of breath. IV bumex. OT session today. Call light within reach. Safety precautions in place. Plan: CÉSAR; pending choice and insurance auth.   Problem: Patient Centered Care  Goal: Patient preferences are identified and integrated in the patient's plan of care  Description: Interventions:  - What would you like us to know as we care for you? I live with my  and two grandchildren  - Provide timely, complete, and accurate information to patient/family  - Incorporate patient and family knowledge, values, beliefs, and cultural backgrounds into the planning and delivery of care  - Encourage patient/family to participate in care and decision-making at the level they choose  - Honor patient and family perspectives and choices  Outcome: Progressing     Problem: Patient/Family Goals  Goal: Patient/Family Long Term Goal  Description: Patient's Long Term Goal: To get better    Interventions:  - IV diuretics, prn O2 supplementation, cardiac monitoring and electrolyte protocol  - See additional Care Plan goals for specific interventions  Outcome: Progressing  Goal: Patient/Family Short Term Goal  Description: Patient's Short Term Goal: To breathe better    Interventions:   - IV diuretics, O2 supplementation prn, cardiac monitoring, electrolyte protocol  - See additional Care Plan goals for specific interventions  Outcome: Progressing     Problem: CARDIOVASCULAR - ADULT  Goal: Maintains optimal cardiac output and hemodynamic stability  Description: INTERVENTIONS:  - Monitor vital signs, rhythm, and trends  - Monitor for bleeding, hypotension and signs of decreased cardiac output  - Evaluate effectiveness of vasoactive medications to optimize hemodynamic stability  - Monitor arterial and/or venous puncture sites for bleeding and/or hematoma  - Assess quality of pulses, skin color and temperature  - Assess for signs of decreased coronary artery perfusion - ex. Angina  - Evaluate fluid balance,  assess for edema, trend weights  Outcome: Progressing  Goal: Absence of cardiac arrhythmias or at baseline  Description: INTERVENTIONS:  - Continuous cardiac monitoring, monitor vital signs, obtain 12 lead EKG if indicated  - Evaluate effectiveness of antiarrhythmic and heart rate control medications as ordered  - Initiate emergency measures for life threatening arrhythmias  - Monitor electrolytes and administer replacement therapy as ordered  Outcome: Progressing     Problem: RESPIRATORY - ADULT  Goal: Achieves optimal ventilation and oxygenation  Description: INTERVENTIONS:  - Assess for changes in respiratory status  - Assess for changes in mentation and behavior  - Position to facilitate oxygenation and minimize respiratory effort  - Oxygen supplementation based on oxygen saturation or ABGs  - Provide Smoking Cessation handout, if applicable  - Encourage broncho-pulmonary hygiene including cough, deep breathe, Incentive Spirometry  - Assess the need for suctioning and perform as needed  - Assess and instruct to report SOB or any respiratory difficulty  - Respiratory Therapy support as indicated  - Manage/alleviate anxiety  - Monitor for signs/symptoms of CO2 retention  Outcome: Progressing     Problem: GENITOURINARY - ADULT  Goal: Absence of urinary retention  Description: INTERVENTIONS:  - Assess patient’s ability to void and empty bladder  - Monitor intake/output and perform bladder scan as needed  - Follow urinary retention protocol/standard of care  - Consider collaborating with pharmacy to review patient's medication profile  - Implement strategies to promote bladder emptying  Outcome: Progressing     Problem: Impaired Functional Mobility  Goal: Achieve highest/safest level of mobility/gait  Description: Interventions:  - Assess patient's functional ability and stability  - Promote increasing activity/tolerance for mobility and gait  - Educate and engage patient/family in tolerated activity level and  precautions  Outcome: Progressing

## 2024-02-04 NOTE — PROGRESS NOTES
Progress Note  Margaret Silverio Patient Status:  Inpatient    3/14/1945 MRN [id number]   Location [facility] 3W/SW Attending Scott Mendez,*   Hosp Day # 1 PCP Johnathon Rodriguez,      Subjective:  No acute events overnight.  Siting up in a chair, on room air, reports only mild exertional dyspnea.  Denies any CP, palpitations or dizziness at this time.     Objective:  /65 (BP Location: Right arm)   Pulse 61   Temp 97.5 °F (36.4 °C) (Oral)   Resp 16   Wt 149 lb 9.6 oz (67.9 kg)   SpO2 95%   BMI 24.89 kg/m²     Telemetry: A-V paced rhythm, HR 60s       Intake/Output:    Intake/Output Summary (Last 24 hours) at 2024 1326  Last data filed at 2/3/2024 1709  Gross per 24 hour   Intake 120 ml   Output 300 ml   Net -180 ml       Last 3 Weights   24 0453 149 lb 9.6 oz (67.9 kg)   24 0032 149 lb 14.4 oz (68 kg)   24 135 lb (61.2 kg)   24 0536 153 lb 11.2 oz (69.7 kg)   24 0441 151 lb 8 oz (68.7 kg)   24 0535 146 lb (66.2 kg)   24 1312 146 lb (66.2 kg)   24 0432 146 lb (66.2 kg)   24 0430 151 lb (68.5 kg)   01/15/24 0536 153 lb (69.4 kg)   24 0119 150 lb 5.7 oz (68.2 kg)   24 2034 140 lb (63.5 kg)   23 1412 145 lb (65.8 kg)       Labs:  Recent Labs   Lab 24  2112 24  0626 24  0227 24  0641   GLU 55* 127*  --  86   BUN 17 18  --  19   CREATSERUM 1.47* 1.48*  --  1.34*   EGFRCR 37* 36*  --  41*   CA 8.8 8.3*  --  8.3*    136  --  138   K 4.3 3.6 4.2 4.4    103  --  106   CO2 19.0* 22.0  --  25.0     Recent Labs   Lab 24  0641   RBC 3.13* 2.75* 2.79*   HGB 9.5* 8.4* 8.4*   HCT 29.8* 25.5* 25.8*   MCV 95.2 92.7 92.5   MCH 30.4 30.5 30.1   MCHC 31.9 32.9 32.6   RDW 19.9* 19.9* 19.1*   NEPRELIM 3.77  --  3.19   WBC 4.2 4.0 3.7*   .0 147.0* 97.0*         Recent Labs   Lab 24   TROPHS 34       Review of Systems    Constitutional: Negative.   Cardiovascular:  Positive for dyspnea on exertion and leg swelling.   Respiratory: Negative.     Skin:         Left arm nonpitting edema        Physical Exam:    Gen: alert, oriented x 3, NAD  Heent: pupils equal, reactive. Mucous membranes moist.   Neck: no jvd  Cardiac: regular rate and rhythm, normal S1,S2, 2/6 systolic murmur, no gallop or rub   Lungs: fine bibasilar crackles, diminished   Abd: soft, NT/ND +bs  Ext: lower extremities +1 edema, left upper arm nonpitting edema  Skin: Warm, dry  Neuro: No focal deficits      Medications:     sodium phosphate  15 mmol Intravenous Once    amitriptyline  25 mg Oral Nightly    carvedilol  25 mg Oral BID    sacubitril-valsartan  1 tablet Oral BID    ferrous sulfate  325 mg Oral Daily with breakfast    folic acid  800 mcg Oral Daily    pantoprazole  40 mg Oral BID AC    bumetanide  1 mg Intravenous BID (Diuretic)    [START ON 2/7/2024] alendronate  70 mg Oral Weekly       Assessment:  Acute hypoxic respiratory failure likley d/t volume overload, CHF  Of supplemntal O2  Acute on chronic HFrEF  Echo 1/14/24 LVEF 35-40%, mitral valve bioprosthesis is present, sev TR, PASP 43 mmHg   Diuresis with IV Bumex 1 mg bid, net fluid off -140 ml, weight not changed?accuracy   On carvedilol, Entresto,   NICM s/p ICD 09/2023 Sanchez  Will order device interrogation to assess RV pacing percentage, and possible consider upgrading to CRT-D in the future    Severe tricuspid regurgitation  PAF/A-flutter, rates controlled   On BB  DOAC being held d/t GIB  Would consider OP Watchman procedure eval.  CKD3, crt 1.34, improving with diuresis   Lower extremities edema  Recent GIB d/t AVMs, holding off DOAC  Chronic anemia, hgb   HTN-controlled   Rheumatoid arthritis      Plan:  Still volume positive with bibasilar crackles and lower extremities edema-continue IV Bumex, ?accuracy of I/Os and DW, will transition to PO likely tomorrow.  Monitor I/Os and daily  weights.  Monitor electrolytes and kidney function.  Continue carvedilol, entresto.  Holding DOAC with recent GIB, hgb 8.4 stable today.   Would benefit from Watchman procedure given recent GIB, will  reintroduce in OP settings, previously declined.  Will order device interrogation to assess RV pacing percentage.      Plan of care discussed with patient, RN.      Kelsi Caal, KODI  2/4/2024  1:26 PM  [phone]    ____________________________  Pt s data reviewed and discussed with the APN.    Tele: av paced      I have personally performed the medical decision making in its entirety. My additions include:  Plan  - as above.    R3    ____________________________  Jenny Celestin MD, FACC, RS  Cardiac Electrophysiololgy  [facility]  2/4/2024

## 2024-02-04 NOTE — CM/SW NOTE
09:20AM  Received MDO for DC Planning: May need valerie vs [facility]/pt/ot.    Please see SW note from yesterday for further details as well.    BRADEN spoke to pt's dtr Barbi via phone this AM. VALERIE list of quality data sent to her via email at: [email or web address]. Follow up instructions/information provided to Barbi.    No questions at this time.    10;15AM  Notified by OT Arnulfo - note available. Note uploaded to [employer].    Need for DC:  VALERIE choice  Ins Auth    PLAN: Possible VALERIE - pending above & med clear      SW/CM to remain available for support and/or discharge planning.         Juliana Rodrigez, MSW, LSW [phone]

## 2024-02-04 NOTE — PLAN OF CARE
Continued on IV diuresis. Strict I & O. Fluid restriction maintained. Fall risk precaution. Potassium replaced and repeat serum level is 4.2. For CÉSAR transfer when medically cleared.   Problem: Patient Centered Care  Goal: Patient preferences are identified and integrated in the patient's plan of care  Description: Interventions:  - What would you like us to know as we care for you? I live with my  and two grandchildren  - Provide timely, complete, and accurate information to patient/family  - Incorporate patient and family knowledge, values, beliefs, and cultural backgrounds into the planning and delivery of care  - Encourage patient/family to participate in care and decision-making at the level they choose  - Honor patient and family perspectives and choices  Outcome: Progressing     Problem: Patient/Family Goals  Goal: Patient/Family Long Term Goal  Description: Patient's Long Term Goal: To get better    Interventions:  - IV diuretics, prn O2 supplementation, cardiac monitoring and electrolyte protocol  - See additional Care Plan goals for specific interventions  Outcome: Progressing  Goal: Patient/Family Short Term Goal  Description: Patient's Short Term Goal: To breathe better    Interventions:   - IV diuretics, O2 supplementation prn, cardiac monitoring, electrolyte protocol  - See additional Care Plan goals for specific interventions  Outcome: Progressing     Problem: CARDIOVASCULAR - ADULT  Goal: Maintains optimal cardiac output and hemodynamic stability  Description: INTERVENTIONS:  - Monitor vital signs, rhythm, and trends  - Monitor for bleeding, hypotension and signs of decreased cardiac output  - Evaluate effectiveness of vasoactive medications to optimize hemodynamic stability  - Monitor arterial and/or venous puncture sites for bleeding and/or hematoma  - Assess quality of pulses, skin color and temperature  - Assess for signs of decreased coronary artery perfusion - ex. Angina  - Evaluate  fluid balance, assess for edema, trend weights  Outcome: Progressing  Goal: Absence of cardiac arrhythmias or at baseline  Description: INTERVENTIONS:  - Continuous cardiac monitoring, monitor vital signs, obtain 12 lead EKG if indicated  - Evaluate effectiveness of antiarrhythmic and heart rate control medications as ordered  - Initiate emergency measures for life threatening arrhythmias  - Monitor electrolytes and administer replacement therapy as ordered  Outcome: Progressing     Problem: RESPIRATORY - ADULT  Goal: Achieves optimal ventilation and oxygenation  Description: INTERVENTIONS:  - Assess for changes in respiratory status  - Assess for changes in mentation and behavior  - Position to facilitate oxygenation and minimize respiratory effort  - Oxygen supplementation based on oxygen saturation or ABGs  - Provide Smoking Cessation handout, if applicable  - Encourage broncho-pulmonary hygiene including cough, deep breathe, Incentive Spirometry  - Assess the need for suctioning and perform as needed  - Assess and instruct to report SOB or any respiratory difficulty  - Respiratory Therapy support as indicated  - Manage/alleviate anxiety  - Monitor for signs/symptoms of CO2 retention  Outcome: Progressing     Problem: GENITOURINARY - ADULT  Goal: Absence of urinary retention  Description: INTERVENTIONS:  - Assess patient’s ability to void and empty bladder  - Monitor intake/output and perform bladder scan as needed  - Follow urinary retention protocol/standard of care  - Consider collaborating with pharmacy to review patient's medication profile  - Implement strategies to promote bladder emptying  Outcome: Progressing     Problem: Impaired Functional Mobility  Goal: Achieve highest/safest level of mobility/gait  Description: Interventions:  - Assess patient's functional ability and stability  - Promote increasing activity/tolerance for mobility and gait  - Educate and engage patient/family in tolerated activity  level and precautions  - Recommend use of  RW for transfers and ambulation  Outcome: Progressing

## 2024-02-05 ENCOUNTER — APPOINTMENT (OUTPATIENT)
Dept: GENERAL RADIOLOGY | Facility: HOSPITAL | Age: 79
End: 2024-02-05
Attending: HOSPITALIST
Payer: MEDICARE

## 2024-02-05 LAB
ANION GAP SERPL CALC-SCNC: 7 MMOL/L (ref 0–18)
BASOPHILS # BLD AUTO: 0.03 X10(3) UL (ref 0–0.2)
BASOPHILS NFR BLD AUTO: 1 %
BUN BLD-MCNC: 17 MG/DL (ref 9–23)
BUN/CREAT SERPL: 13.4 (ref 10–20)
CALCIUM BLD-MCNC: 8.4 MG/DL (ref 8.7–10.4)
CHLORIDE SERPL-SCNC: 105 MMOL/L (ref 98–112)
CO2 SERPL-SCNC: 24 MMOL/L (ref 21–32)
CREAT BLD-MCNC: 1.27 MG/DL
DEPRECATED RDW RBC AUTO: 58.6 FL (ref 35.1–46.3)
EGFRCR SERPLBLD CKD-EPI 2021: 44 ML/MIN/1.73M2 (ref 60–?)
EOSINOPHIL # BLD AUTO: 0.04 X10(3) UL (ref 0–0.7)
EOSINOPHIL NFR BLD AUTO: 1.3 %
ERYTHROCYTE [DISTWIDTH] IN BLOOD BY AUTOMATED COUNT: 19.4 % (ref 11–15)
GLUCOSE BLD-MCNC: 88 MG/DL (ref 70–99)
HCT VFR BLD AUTO: 27.6 %
HGB BLD-MCNC: 9.3 G/DL
IMM GRANULOCYTES # BLD AUTO: 0.04 X10(3) UL (ref 0–1)
IMM GRANULOCYTES NFR BLD: 1.3 %
LYMPHOCYTES # BLD AUTO: 0.72 X10(3) UL (ref 1–4)
LYMPHOCYTES NFR BLD AUTO: 23.7 %
MAGNESIUM SERPL-MCNC: 1.5 MG/DL (ref 1.6–2.6)
MCH RBC QN AUTO: 31.5 PG (ref 26–34)
MCHC RBC AUTO-ENTMCNC: 33.7 G/DL (ref 31–37)
MCV RBC AUTO: 93.6 FL
MONOCYTES # BLD AUTO: 0.04 X10(3) UL (ref 0.1–1)
MONOCYTES NFR BLD AUTO: 1.3 %
NEUTROPHILS # BLD AUTO: 2.17 X10 (3) UL (ref 1.5–7.7)
NEUTROPHILS # BLD AUTO: 2.17 X10(3) UL (ref 1.5–7.7)
NEUTROPHILS NFR BLD AUTO: 71.4 %
OSMOLALITY SERPL CALC.SUM OF ELEC: 283 MOSM/KG (ref 275–295)
PHOSPHATE SERPL-MCNC: 2.1 MG/DL (ref 2.4–5.1)
PLATELET # BLD AUTO: 63 10(3)UL (ref 150–450)
POTASSIUM SERPL-SCNC: 4.4 MMOL/L (ref 3.5–5.1)
PROCALCITONIN SERPL-MCNC: 2.03 NG/ML (ref ?–0.05)
RBC # BLD AUTO: 2.95 X10(6)UL
SODIUM SERPL-SCNC: 136 MMOL/L (ref 136–145)
WBC # BLD AUTO: 3 X10(3) UL (ref 4–11)

## 2024-02-05 PROCEDURE — 71046 X-RAY EXAM CHEST 2 VIEWS: CPT | Performed by: HOSPITALIST

## 2024-02-05 PROCEDURE — 99233 SBSQ HOSP IP/OBS HIGH 50: CPT | Performed by: INTERNAL MEDICINE

## 2024-02-05 RX ORDER — MAGNESIUM OXIDE 400 MG/1
800 TABLET ORAL ONCE
Status: COMPLETED | OUTPATIENT
Start: 2024-02-05 | End: 2024-02-05

## 2024-02-05 NOTE — DIETARY NOTE
NUTRITION EDUCATION NOTE     Received consult for heart failure diet education. Verbally reviewed basic low sodium diet restrictions. Family present in room. Provided with Low Sodium Nutrition Therapy NCM handout to reinforce. Receptive to instruction. Expect fair compliance.        Sydney Edwards RD, LDN  Clinical Dietitian  P: [phone]

## 2024-02-05 NOTE — CM/SW NOTE
Social work met with the patient, spouse and daughter at bedside to follow up on CÉSAR choice.    The patient's daughter is going to tour facilities today.    The spouse and daughter are weighing options between [facility] and [facility].     Social work will follow up on 2/6 for final choice.    SW/CM to remain available for support and/or discharge planning.     Lin Borges MSW, LSW  Discharge Planner [phone]

## 2024-02-05 NOTE — PROGRESS NOTES
[facility]    Progress Note    Margaret Silverio Patient Status:  Inpatient    3/14/1945 MRN [id number]   Location [facility] 3W/SW Attending Scott Mendez,*   Hosp Day # 2 PCP Johnathon Rodriguez DO     Chief Complaint: hurt all over    Subjective:   Admits to sob and fatigue  No CP    No n/v/d.        No nursing concerns or overnight events    Objective:   Blood pressure 127/69, pulse 60, temperature 97.4 °F (36.3 °C), temperature source Oral, resp. rate 16, weight 148 lb 14.4 oz (67.5 kg), SpO2 100%.  Physical Exam  Vitals and nursing note reviewed.   Constitutional:       General: She is not in acute distress.     Appearance: She is ill-appearing. She is not toxic-appearing or diaphoretic.   HENT:      Head: Normocephalic and atraumatic.   Cardiovascular:      Rate and Rhythm: Normal rate and regular rhythm.      Heart sounds: Murmur heard.   Pulmonary:      Effort: Pulmonary effort is normal.      Breath sounds: Wheezing, rhonchi and rales present.   Abdominal:      General: Bowel sounds are normal.      Palpations: Abdomen is soft.      Tenderness: There is no guarding.   Musculoskeletal:         General: Swelling and tenderness present.   Skin:     General: Skin is warm and dry.      Capillary Refill: Capillary refill takes less than 2 seconds.   Neurological:      General: No focal deficit present.      Mental Status: She is alert and oriented to person, place, and time.   Psychiatric:         Behavior: Behavior normal.         Results:   Lab Results   Component Value Date    WBC 3.0 (L) 2024    HGB 9.3 (L) 2024    HCT 27.6 (L) 2024    PLT 63.0 (L) 2024    CREATSERUM 1.27 (H) 2024    BUN 17 2024     2024    K 4.4 2024     2024    CO2 24.0 2024    GLU 88 2024    CA 8.4 (L) 2024    ALB 3.2 2024    ALKPHO 110 2024    BILT 1.6 (H) 2024    TP 5.6 (L) 2024    AST 38  (H) 01/13/2024    ALT 15 01/13/2024    PTT 42.1 (H) 01/15/2024    INR 1.60 (H) 01/15/2024    T4F 1.2 02/03/2024    TSH 10.681 (H) 02/03/2024    LIP 32 01/13/2024    MG 1.5 (L) 02/05/2024    PHOS 2.1 (L) 02/05/2024    TROPHS 34 02/02/2024    B12 >2,000 (H) 02/03/2024       XR LUMBAR SPINE (MIN 4 VIEWS) (CPT=72110)    Result Date: 2/3/2024  CONCLUSION:  1. No radiographically visible acute osseous injury of the lumbar spine. 2. Mild-to-moderate multilevel lumbar spine degenerative changes. 3. Mild levoscoliosis of the lumbar spine. 4. Demineralization. 5. Lesser incidental findings as above.   Dictated by (CST): Aleks Diana MD on 2/03/2024 at 8:25 PM     Finalized by (CST): Aleks Diana MD on 2/03/2024 at 8:27 PM          XR SHOULDER, COMPLETE (MIN 2 VIEWS), LEFT (CPT=73030)    Result Date: 2/3/2024  CONCLUSION:  1. No radiographically visible acute osseous injury of the left shoulder. 2. Reverse left shoulder arthroplasty, which appears radiographically uncomplicated. 3. Demineralization. 4. Partially imaged left chest wall pacemaker.   Dictated by (CST): Aleks Diana MD on 2/03/2024 at 8:24 PM     Finalized by (CST): Aleks Diana MD on 2/03/2024 at 8:25 PM          XR SHOULDER, COMPLETE (MIN 2 VIEWS), RIGHT (CPT=73030)    Result Date: 2/3/2024  CONCLUSION:  1. No radiographically visible acute osseous injury of the right shoulder. 2. Postsurgical changes of right reverse shoulder arthroplasty with radiographically uncomplicated hardware. 3. Mild acromioclavicular joint osteoarthritis. 4. Partially imaged hazy opacity at the right lung base, which is better assessed on recent prior chest radiograph.   Dictated by (CST): Aelks Diana MD on 2/03/2024 at 8:22 PM     Finalized by (CST): Aleks Diana MD on 2/03/2024 at 8:24 PM               Assessment & Plan:   Acute on chronic combined CHF  Severe tricuspid valve regurgitation  Bioprosthetic mitral valve  NICM status post ICD in September 2023  Recent  echo showed ejection fraction 37% with grade 4 tricuspid regurg  Cardiology is now on consult  Lasix given in the emergency room and started on Bumex 1 mg twice daily diuresis as per cards; legs swollen; cxr no chf  Continue Entresto and carvedilol  2/5/24: waiting interrogation of device; c/u IV bumex per cards, c/u holding DOAC due to recent GIB would benefit from watchman altho has refused in the past, hb stable. Will dc to [facility] once ccleared by cardiology.     Paroxysmal atrial fibrillation   Hold oral anticoagulation given recent GI bleed  Currently rate controlled  Still considering outpatient Watchman procedure given her recurrent GI bleeds    Chronic kidney injury stage III: stable  Baseline creatinine is about 1.3  Current creatinine 1.34  Follow urine output    Lower extremity edema  Appears much improved still present    Perceived  acute hypoxic respiratory failure likely 2/2 CHF    - unusual discoid atelectasis on cxr; elevated procalc   -SARS/Influenza, RSV neg 2/2/24  - Start abx if fever or obj start atelectasis tx    Recent GI bleed secondary to AVMs  Chronic anemia  Severe shoulder pain after falls and trying to get pt up; check xrays; pt eval;meds  Deconditioning :  PT/OT rec CÉSAR pending SW/insurance     Quality:  DVT Mechanical Prophylaxis: scd       Dispo: to [facility] when morel complete          Patient will require inpatient services that will reasonably be expected to span two midnight's based on the clinical documentation in H+P.   Based on patients current state of illness, I anticipate that, after discharge, patient will require TBD.  Complex mdm; coordinating care with nurse and counseling pt and with her permission her  in room about pain/chf/poss pneumonia??    Danielle Barber MD

## 2024-02-05 NOTE — CDS QUERY
How to answer this Query:    1.) DON'T CLICK COSIGN BUTTON FIRST  2.) Click \"3 dots...\" to the right of cosign button and click EDIT on the toolbar.  2.) Type an \"X\" in the bracket for the diagnosis that applies. (You may also add additional clinical details as you feel necessary to substantiate your response).   3.) Finally click \"Sign\" to complete response.  Thank You    CLINICAL DOCUMENTATION CLARIFICATION FORM  Dear Doctor:MALIK     Please choose the clarification below as appropriate:    1.  The attending physician is required to clarify conflicting documentation in the medical record.  The following documentation is noted in the medical record:    23 DR GAN-Acute on chronic combined CHF   24 DR FRANKLIN-Acute on chronic HFrEF     Please clarify the appropriate diagnosis for this patient.      (  x ) Acute on Chronic Systolic Heart Failure    (    ) Acute on chronic combined systolic and diastolic Heart Failure  (    ) Other - please specify:       RISK FACTORS:CHF  CLINICAL INDICATORS: ECHO 1/14 LVEF 35-40 SYS FUNCTION MODERATELY REDUCED, UNABLE TO ASSESS LV DIASTOLIC FUNCTION  TREATMENT: CARDIOLOGY CONSULT:IV BUMEX, COREG AND ENTRESTO       If you have any questions, please contact Clinical :  Lea AVALOS RN at [phone]     Thank You!     THIS FORM IS A PERMANENT PART OF THE MEDICAL RECORD

## 2024-02-05 NOTE — PAYOR COMM NOTE
--------------  CONTINUED STAY REVIEW    Payor: [employer]  Subscriber #:  [id number]  Authorization Number: [id number]    Admit date: 2/3/24  Admit time: 12:22 AM    Admitting Physician: Danielle Barber MD  Attending Physician:  Danielle Barber MD  Primary Care Physician: Johnathon Rodriguez DO    REVIEW DOCUMENTATION:   2-4-24      Physical Exam  Vitals and nursing note reviewed.   Constitutional:       General: She is not in acute distress.     Appearance: She is ill-appearing. She is not toxic-appearing or diaphoretic.   HENT:      Head: Normocephalic and atraumatic.   Cardiovascular:      Rate and Rhythm: Normal rate and regular rhythm.      Heart sounds: Murmur heard.   Pulmonary:      Effort: Pulmonary effort is normal.      Breath sounds: Wheezing, rhonchi and rales present.   Abdominal:      General: Bowel sounds are normal.      Palpations: Abdomen is soft.      Tenderness: There is no guarding.   Musculoskeletal:         General: Swelling and tenderness present.   Skin:     General: Skin is warm and dry.      Capillary Refill: Capillary refill takes less than 2 seconds.   Neurological:      General: No focal deficit present.      Mental Status: She is alert and oriented to person, place, and time.   Psychiatric:         Behavior: Behavior normal.      Lab Results   Component Value Date     WBC 3.7 (L) 02/04/2024     HGB 8.4 (L) 02/04/2024     HCT 25.8 (L) 02/04/2024     PLT 97.0 (L) 02/04/2024     CREATSERUM 1.34 (H) 02/04/2024     BUN 19 02/04/2024      02/04/2024     K 4.4 02/04/2024      02/04/2024     CO2 25.0 02/04/2024     GLU 86 02/04/2024     CA 8.3 (L) 02/04/2024     Assessment & Plan:   Acute on chronic combined CHF  Severe tricuspid valve regurgitation  Bioprosthetic mitral valve  NICM status post ICD in September 2023  Recent echo showed ejection fraction 37% with grade 4 tricuspid regurg  Cardiology is now on consult  Lasix given in the emergency room and  started on Bumex 1 mg twice daily diuresis as per cards; legs swollen; cxr no chf  Continue Entresto and carvedilol  Paroxysmal atrial fibrillation   Hold oral anticoagulation given recent GI bleed  Currently rate controlled  Still considering outpatient Watchman procedure given her recurrent GI bleeds  Chronic kidney injury stage III  Baseline creatinine is about 1.3  Current creatinine 1.34  Follow urine output  Lower extremity edema  Appears much improved still present  Perceived  acute hypoxic respiratory failure unusual discoid atelectasis on cxr; will check procalc and cultures recheck cxr; poss pneumonia as pt is \"achy and weak all over\"  Start abx if fever or obj start atelectasis tx; check speech  Pulse oximetry is normal on room air  Patient is currently on Lasix 40 mg IV x 1 then Bumex 1 mg twice daily  Recent GI bleed secondary to AVMs  Chronic anemia  Severe shoulder pain after falls and trying to get pt up; check xrays; pt eval;meds    Ls spine xray    CONCLUSION:   1. No radiographically visible acute osseous injury of the lumbar spine.   2. Mild-to-moderate multilevel lumbar spine degenerative changes.   3. Mild levoscoliosis of the lumbar spine.   4. Demineralization.     RIGHT SHOULDER XRAY      CONCLUSION:   1. No radiographically visible acute osseous injury of the right shoulder.   2. Postsurgical changes of right reverse shoulder arthroplasty with radiographically uncomplicated hardware.   3. Mild acromioclavicular joint osteoarthritis.   4. Partially imaged hazy opacity at the right lung base, which is better assessed on recent prior chest radiograph.     LEFT SHOULDER XRAY    CONCLUSION:   1. No radiographically visible acute osseous injury of the left shoulder.   2. Reverse left shoulder arthroplasty, which appears radiographically uncomplicated.   3. Demineralization.   4. Partially imaged left chest wall pacemaker.       CARDIOLOGY PROGRESS NOTE:     Cardiovascular:  Positive for dyspnea  on exertion and leg swelling.   Respiratory: Negative.     Skin:         Left arm nonpitting edema          Physical Exam:    Gen: alert, oriented x 3, NAD  Heent: pupils equal, reactive. Mucous membranes moist.   Neck: no jvd  Cardiac: regular rate and rhythm, normal S1,S2, 2/6 systolic murmur, no gallop or rub   Lungs: fine bibasilar crackles, diminished   Abd: soft, NT/ND +bs  Ext: lower extremities +1 edema, left upper arm nonpitting edema  Skin: Warm, dry  Neuro: No focal deficits     Plan:  Still volume positive with bibasilar crackles and lower extremities edema-continue IV Bumex, ?accuracy of I/Os and DW, will transition to PO likely tomorrow.  Monitor I/Os and daily weights.  Monitor electrolytes and kidney function.  Continue carvedilol, entresto.  Holding DOAC with recent GIB, hgb 8.4 stable today.   Would benefit from Watchman procedure given recent GIB, will  reintroduce in OP settings, previously declined.        MEDICATIONS ADMINISTERED IN LAST 1 DAY:  amitriptyline (Elavil) tab 25 mg       Date Action Dose Route User    2/4/2024 2146 Given 25 mg Oral Breanna Diamond RN          bumetanide (Bumex) 0.25 MG/ML injection 1 mg       Date Action Dose Route User    2/5/2024 0822 Given 1 mg Intravenous Chioma Vásquez RN    2/4/2024 1609 Given 1 mg Intravenous Sogumonica Ofe          carvedilol (Coreg) tab 25 mg       Date Action Dose Route User    2/5/2024 0822 Given 25 mg Oral Chioma Vásquez RN    2/4/2024 2146 Given 25 mg Oral Breanna Diamond RN          ferrous sulfate DR tab 325 mg       Date Action Dose Route User    2/5/2024 0823 Given 325 mg Oral Chioma Vásquez RN          folic acid (Folvite) tab 800 mcg       Date Action Dose Route User    2/5/2024 0822 Given 800 mcg Oral Chioma Vásquez RN          HYDROcodone-acetaminophen (Norco)  MG per tab 1 tablet       Date Action Dose Route User    2/5/2024 0826 Given 1 tablet Oral Chioma Vásquez RN    2/4/2024 2159 Given 1 tablet Oral Lobo  JEFFERY Carlos          magnesium oxide (Mag-Ox) tab 800 mg       Date Action Dose Route User    2/5/2024 0822 Given 800 mg Oral Chioma Vásquez RN          pantoprazole (Protonix) DR tab 40 mg       Date Action Dose Route User    2/5/2024 0634 Given 40 mg Oral Breanna Diamond RN    2/4/2024 1609 Given 40 mg Oral Sogura, Ofe          sacubitril-valsartan (Entresto) 24-26 MG per tab 1 tablet       Date Action Dose Route User    2/5/2024 0822 Given 1 tablet Oral Chioma Vásquez RN    2/4/2024 2146 Given 1 tablet Oral Breanna Diamond RN          sodium phosphate 15 mmol in 0.9% NaCl 100mL IVPB premix       Date Action Dose Route User    2/5/2024 0851 Given 15 mmol Intravenous Chioma Vásquez RN            Vitals (last day)       Date/Time Temp Pulse Resp BP SpO2 Weight O2 Device O2 Flow Rate (L/min) [facility]    02/05/24 1357 97.8 °F (36.6 °C) 60 16 101/56 97 % -- None (Room air) --     02/05/24 0813 -- 60 -- -- -- -- -- --     02/05/24 0811 97.4 °F (36.3 °C) 60 16 127/69 100 % -- None (Room air) --     02/05/24 0635 -- -- -- -- -- 148 lb 14.4 oz -- -- RA    02/05/24 0529 97.3 °F (36.3 °C) 67 16 123/65 94 % -- None (Room air) -- CB    02/04/24 2144 97.3 °F (36.3 °C) 60 18 106/61 95 % -- None (Room air) -- CB    02/04/24 1607 97.3 °F (36.3 °C) 60 16 105/60 96 % -- None (Room air) -- MS    02/04/24 1020 97.5 °F (36.4 °C) 61 16 116/65 95 % -- None (Room air) -- MS    02/04/24 1000 -- 64 -- -- -- -- -- -- ST    02/04/24 0453 -- -- -- -- -- 149 lb 9.6 oz -- -- RO    02/04/24 0434 97.4 °F (36.3 °C) 61 17 112/64 100 % -- None (Room air) -- AM

## 2024-02-05 NOTE — PAYOR COMM NOTE
--------------  ADMISSION REVIEW     Payor: [employer]  Subscriber #:  [id number]  Authorization Number: [id number]    Admit date: 2/3/24  Admit time: 12:22 AM       REVIEW DOCUMENTATION:     ED Provider Notes        ED Provider Notes signed by Shashi Williamson MD at 2/2/2024 11:55 PM       Author: Shashi Williamson MD Service: -- Author Type: Physician    Filed: 2/2/2024 11:55 PM Date of Service: 2/2/2024 10:49 PM Status: Signed    : Shashi Williamson MD (Physician)           Patient Seen in: [facility] Emergency Department      History     Chief Complaint   Patient presents with    Shortness Of Breath    Fall     Stated Complaint: sob    Subjective:   HPI    78-year-old female presents for evaluation for shortness of breath.  Patient was recently admitted to the hospital.  Daughter reports that when she was discharged home she still had swelling to her legs and was very weak and having difficulty ambulating.  She had worsening difficulty ambulating and worsening shortness of breath.  She has been compliant with her diuretics.  She has had a nonproductive cough.  She reports lower extremity swelling.  She denies any fevers, chills, chest pain.    Objective:   Past Medical History:   Diagnosis Date    Anemia     Arrhythmia     Arthritis     Deep vein thrombosis (HCC)     Essential hypertension     High blood pressure     History of blood transfusion     Seizure disorder (HCC)     Seizures (HCC)        Past Surgical History:   Procedure Laterality Date    COLONOSCOPY N/A 01/17/2024    Dr. Rios    COLONOSCOPY N/A 1/17/2024    Procedure: COLONOSCOPY;  Surgeon: Zoraida Rios MD;  Location: [facility] ENDOSCOPY    EGD N/A 01/17/2024    Dr. Rios    OTHER SURGICAL HISTORY  2017    Heart Surgery     OTHER SURGICAL HISTORY  2020    Bilateral shoulder replacement        Physical Exam     ED Triage Vitals   BP 02/02/24 2053 (!) 131/108   Pulse 02/02/24 2056 75   Resp 02/02/24 2053  (!) 36   Temp 24 97.3 °F (36.3 °C)   Temp src 24 Oral   SpO2 24 98 %   O2 Device 24 Nasal cannula       Current:/75   Pulse 79   Temp 97.3 °F (36.3 °C) (Oral)   Resp 18   Wt 61.2 kg   SpO2 99%   BMI 22.47 kg/m²     Physical Exam  Vitals and nursing note reviewed.   Constitutional:       Appearance: Normal appearance.   HENT:      Head: Normocephalic and atraumatic.   Cardiovascular:      Rate and Rhythm: Normal rate and regular rhythm.      Pulses: Normal pulses.           Radial pulses are 2+ on the right side and 2+ on the left side.      Heart sounds: Murmur heard.   Pulmonary:      Effort: Pulmonary effort is normal.      Breath sounds: Rales present.   Musculoskeletal:         General: Normal range of motion.      Cervical back: Normal range of motion.      Right lower le+ Pitting Edema present.      Left lower le+ Pitting Edema present.   Skin:     General: Skin is warm and dry.   Neurological:      General: No focal deficit present.      Mental Status: She is alert.       ED Course     Labs Reviewed   BASIC METABOLIC PANEL (8) - Abnormal; Notable for the following components:       Result Value    Glucose 55 (*)     CO2 19.0 (*)     Creatinine 1.47 (*)     eGFR-Cr 37 (*)     All other components within normal limits   BNP (B TYPE NATRIURETIC PEPTIDE) - Abnormal; Notable for the following components:    Beta Natriuretic Peptide 18,762 (*)     All other components within normal limits   RBC MORPHOLOGY SCAN - Abnormal; Notable for the following components:    RBC Morphology See morphology below (*)     Platelet Morphology See morphology below (*)     Macrocytosis 2+ (*)     Giant platelets Few (*)     All other components within normal limits   CBC W/ DIFFERENTIAL - Abnormal; Notable for the following components:    RBC 3.13 (*)     HGB 9.5 (*)     HCT 29.8 (*)     RDW-SD 61.5 (*)     RDW 19.9 (*)     Lymphocyte Absolute 0.30 (*)     Monocyte  Absolute 0.04 (*)     All other components within normal limits   TROPONIN I HIGH SENSITIVITY - Normal   POCT GLUCOSE - Normal   SARS-COV-2/FLU A AND B/RSV BY PCR (GENEXPERT) - Normal     EKG    Rate, intervals and axes as noted on EKG Report.  Rate: 74  Rhythm: paced  Reading: paced, no stemi, interpreted by myself.    Disposition and Plan     Clinical Impression:  1. Acute on chronic congestive heart failure, unspecified heart failure type (HCC)         Disposition:  Admit  2/2/2024 11:46 pm        Signed by Shashi Williamson MD on 2/2/2024 11:55 PM         CXR  Moderately enlarged heart, MVR, dual lead ICD, old abandoned epicardial pacing wire overlying the right atrium      Normal lung volumes      Normal vascularity      Chronic small volume right basilar effusion with round atelectasis at the right base      Poorly visible left lung base, similar to prior radiograph.  Recent CT abdomen showed no basilar abnormality       History and physical      Assessment & Plan:   # 78-year-old female comes emergency room with shortness of breath found to have a pulse oximetry of 90 on room air placed on oxygen with relief and given Lasix.  Laboratory revealed elevated BNP x-ray did not appear wet but clinically she was.     Acute on chronic combined CHF  Severe tricuspid valve regurgitation  Bioprosthetic mitral valve  NICM status post ICD in September 2023  Recent echo showed ejection fraction 37% with grade 4 tricuspid regurg  Cardiology is now on consult  Lasix given in the emergency room and started on Bumex 1 mg twice daily  Continue Entresto and carvedilol  Paroxysmal atrial fibrillation   Hold oral anticoagulation given recent GI bleed  Currently rate controlled  Still considering outpatient Watchman procedure given her recurrent GI bleeds  Chronic kidney injury stage III  Baseline creatinine is about 1.3  Current creatinine 1.47  Follow urine output  Lower extremity edema  Appears much improved  Perceived  hypoxic respiratory failure  Pulse oximetry is normal on room air  Patient is currently on Lasix 40 mg IV x 1 then Bumex 1 mg twice daily  Recent GI bleed secondary to AVMs  Chronic anemia      furosemide (Lasix) 10 mg/mL injection 40 mg  Dose: 40 mg  Freq: Once Route: IV  Start: 02/02/24 2315 End: 02/02/24 2348       bumetanide (Bumex) 0.25 MG/ML injection 1 mg  Dose: 1 mg  Freq: 2 times daily (diuretic) Route: IV  Start: 02/03/24 0900       02/03/24 1709 -- -- 18 114/61 97 % -- None (Room air) -- EB   02/03/24 0959 97.5 °F (36.4 °C) -- 20 132/71 98 % -- None (Room air) -- EB   02/03/24 0936 -- 60 -- 134/82 -- -- -- -- TL   02/03/24 0530 98.8 °F (37.1 °C) 60 21 116/68 95 % -- None (Room air) -- KG   02/03/24 0032 97.8 °F (36.6 °C) 76 20 146/74 100 % 149 lb 14.4 oz Nasal cannula 2 L/min KG       2/02/24 2115 -- 74 14 154/78 100 % -- Nasal cannula 5 L/min ER   02/02/24 2056 -- 75 -- -- 98 % -- Nasal cannula -- TC   02/02/24 2053 97.3 °F (36.3 °C) -- 36 Abnormal  131/108 Abnormal  -- 135 lb Nasal cannula 6 L/min TC

## 2024-02-05 NOTE — PLAN OF CARE
Problem: Patient Centered Care  Goal: Patient preferences are identified and integrated in the patient's plan of care  Description: Interventions:  - What would you like us to know as we care for you? I live with my  and two grandchildren  - Provide timely, complete, and accurate information to patient/family  - Incorporate patient and family knowledge, values, beliefs, and cultural backgrounds into the planning and delivery of care  - Encourage patient/family to participate in care and decision-making at the level they choose  - Honor patient and family perspectives and choices  Outcome: Progressing     Problem: Patient/Family Goals  Goal: Patient/Family Long Term Goal  Description: Patient's Long Term Goal: To get better    Interventions:  - IV diuretics, prn O2 supplementation, cardiac monitoring and electrolyte protocol  - See additional Care Plan goals for specific interventions  Outcome: Progressing  Goal: Patient/Family Short Term Goal  Description: Patient's Short Term Goal: To breathe better    Interventions:   - IV diuretics, O2 supplementation prn, cardiac monitoring, electrolyte protocol  - See additional Care Plan goals for specific interventions  Outcome: Progressing     Problem: CARDIOVASCULAR - ADULT  Goal: Maintains optimal cardiac output and hemodynamic stability  Description: INTERVENTIONS:  - Monitor vital signs, rhythm, and trends  - Monitor for bleeding, hypotension and signs of decreased cardiac output  - Evaluate effectiveness of vasoactive medications to optimize hemodynamic stability  - Monitor arterial and/or venous puncture sites for bleeding and/or hematoma  - Assess quality of pulses, skin color and temperature  - Assess for signs of decreased coronary artery perfusion - ex. Angina  - Evaluate fluid balance, assess for edema, trend weights  Outcome: Progressing  Goal: Absence of cardiac arrhythmias or at baseline  Description: INTERVENTIONS:  - Continuous cardiac monitoring,  monitor vital signs, obtain 12 lead EKG if indicated  - Evaluate effectiveness of antiarrhythmic and heart rate control medications as ordered  - Initiate emergency measures for life threatening arrhythmias  - Monitor electrolytes and administer replacement therapy as ordered  Outcome: Progressing     Problem: RESPIRATORY - ADULT  Goal: Achieves optimal ventilation and oxygenation  Description: INTERVENTIONS:  - Assess for changes in respiratory status  - Assess for changes in mentation and behavior  - Position to facilitate oxygenation and minimize respiratory effort  - Oxygen supplementation based on oxygen saturation or ABGs  - Provide Smoking Cessation handout, if applicable  - Encourage broncho-pulmonary hygiene including cough, deep breathe, Incentive Spirometry  - Assess the need for suctioning and perform as needed  - Assess and instruct to report SOB or any respiratory difficulty  - Respiratory Therapy support as indicated  - Manage/alleviate anxiety  - Monitor for signs/symptoms of CO2 retention  Outcome: Progressing     Problem: GENITOURINARY - ADULT  Goal: Absence of urinary retention  Description: INTERVENTIONS:  - Assess patient’s ability to void and empty bladder  - Monitor intake/output and perform bladder scan as needed  - Follow urinary retention protocol/standard of care  - Consider collaborating with pharmacy to review patient's medication profile  - Implement strategies to promote bladder emptying  Outcome: Progressing     Problem: Impaired Functional Mobility  Goal: Achieve highest/safest level of mobility/gait  Description: Interventions:  - Assess patient's functional ability and stability  - Promote increasing activity/tolerance for mobility and gait  - Educate and engage patient/family in tolerated activity level and precautions  Outcome: Progressing     Patient alert and oriented x4.  PRN Norco given for pain.  Call light within reach.  Bed locked and in lowest position, bed alarm on.   Plan for CÉSAR once medically cleared.

## 2024-02-05 NOTE — PHYSICAL THERAPY NOTE
PHYSICAL THERAPY TREATMENT NOTE - INPATIENT     Room Number: 332/332-A       Presenting Problem: acute on chronic CHF, weakness and shortness of breath. Fell out of bed.    Presenting Problem: acute on chronic CHF, weakness and shortness of breath. Fell out of bed.  Co-Morbidities : multiple previous admissions she was just discharged on January 20.  She had been admitted for GI bleed and found to have AVMs.      Problem List  Principal Problem:    Acute on chronic congestive heart failure, unspecified heart failure type (HCC)      PHYSICAL THERAPY ASSESSMENT   Chart reviewed. Including current radiology in chart ,. RN  approved participation in physical therapy. Dtr present.  Therapy tech Ibeth present. . PPE worn by therapist: mask and gloves. Patient was not wearing a mask during session. Patient presented in bed with diffuse pain stating :\"  I hurt all over including HA \" .   Pt with chronic pain per pt and pt dtr observed pain patch on neck area.  Pt localizing pain to HA , neck, B shoulders , spine and B LE .  Pt with hx of RA with observed B hand deformity , generalized swelling , severe loss of ROM in B shoulders and UE , muscle wasting , decrease flexed type posture laying in bed,        Patient with  decreased  progress towards goals during this session. Education provided on Physical therapy plan of care, physiological benefits of out of bed mobility, and B AP , AA R and L LAQ with respect for pain , B Quad and glut sets education initiated , gentle fxn use of B UE and LE encouraged throughout session with respect for pt pain  .  Fxn mobility training with 2 A needed for mobility to chair from bed and DC planning with pt and dtr.   Patient with fair carryover.       Pt reports hx of c spine sx in 2002 with chronic pain and significant loss of mobility and ROM prior to admission,.  Per dtr pt spending most of time in bed yet was able to ambulate short distances to bathroom with family support no AD prior to  admission.,       Bed mobility:  Mod assist of 2 provided .  Pain and weakness limiting factor .    Max assist to scoot to EOB ,.  Pt unable to completely support self at EOB due to weakness and pain. Pt required close CGA to constant light min assist for safe sit at EOB>    Transfers: Max to Mod assist of 2 provided for partial stand pivot bed to chair with no AD and gait belt.   Pt with difficulty achieving upright stand assisting with her LE as her strength allows.  Pt with decrease postural control and trunk weakness noted.  . Pivot transfer to right side as per pt this is her stronger side .   Gait Assistance: Not tested--NA   Distance (ft): partial stand pivot transfer to right bed to chair   unable to achieve full stand ---gait belt 2A  no AD used    Max assist of 2 to scoot back into chair using draw sheet for improved sitting posture. Lift transfers recommended for mobility back to bed with RN staff .       Patient was left in bedside chair, alarm activated, and dtr present , pillows provided for improved upright sit in chair   at end of session with all needs in reach. The patient's Approx Degree of Impairment: 76.75% has been calculated based on documentation in the [facility] '6 clicks' Inpatient Basic Mobility Short Form.  Research supports that patients with this level of impairment may benefit from Long term acute care.  Pt is below her baseline level of fxn per pt and pt dtr .  Pain / weakness and decrease activity tolerance .   Therapy is recommending [facility] as next level of care.  RN aware of patient status post session.    DISCHARGE RECOMMENDATIONS  PT Discharge Recommendations: Sub-acute rehabilitation     PLAN  PT Treatment Plan: Bed mobility;Body mechanics;Endurance;Energy conservation;Patient education;Family education;Balance training;Transfer training;Range of motion;Strengthening    SUBJECTIVE    HA and chronic pain    weakness   agreeable to mobility to chair today     OBJECTIVE  Precautions:  Cardiac;Spine;Bed/chair alarm (Pain / RA)    WEIGHT BEARING RESTRICTION  Weight Bearing Restriction: None                PAIN ASSESSMENT   Rating: Other (Comment)  Location: \" I hurt all over ---HA  neck , shoulder , back and B LE \"   Pt with hx of RA with very limited ROM / hand deformity  Management Techniques: Activity promotion;Breathing techniques;Relaxation;Repositioning;Body mechanics    BALANCE                                                                                                                       Static Sitting: Poor +  Dynamic Sitting: Not tested           Static Standing: Poor  Dynamic Standing: Poor    ACTIVITY TOLERANCE  Pulse: 60 (resting  post activity 59)        BP: 118/71 (resting  post activity   106/62)             O2 WALK  Oxygen Therapy  SPO2% on Room Air at Rest: 100  SPO2% Ambulation on Room Air: 96    AM-PAC '6-Clicks' INPATIENT SHORT FORM - BASIC MOBILITY  How much difficulty does the patient currently have...  Patient Difficulty: Turning over in bed (including adjusting bedclothes, sheets and blankets)?: A Lot   Patient Difficulty: Sitting down on and standing up from a chair with arms (e.g., wheelchair, bedside commode, etc.): A Lot   Patient Difficulty: Moving from lying on back to sitting on the side of the bed?: A Lot   How much help from another person does the patient currently need...   Help from Another: Moving to and from a bed to a chair (including a wheelchair)?: A Lot   Help from Another: Need to walk in hospital room?: Total   Help from Another: Climbing 3-5 steps with a railing?: Total     AM-PAC Score:  Raw Score: 10   Approx Degree of Impairment: 76.75%   Standardized Score (AM-PAC Scale): 32.29   CMS Modifier (G-Code): CL        Patient End of Session: Up in chair;Needs met;Call light within reach;RN aware of session/findings;All patient questions and concerns addressed;Alarm set;Family present (position in chair with pillows for improved upright neutral  sitting posture)    CURRENT GOALS     Goals to be met by: 2/17/24  Patient Goal Patient's self-stated goal is: return to PLOF   Goal #1 Patient is able to demonstrate supine - sit EOB @ level: CGA      Goal #1   Current Status  2A needed    Goal #2 Patient is able to demonstrate transfers EOB to/from Chair/Wheelchair at assistance level: minimum assistance with walker - rolling      Goal #2  Current Status  as above 2 A   provided and needed    Goal #3 Patient is able to ambulate 25 feet with assist device: walker - rolling at assistance level: minimum assistance   Goal #3   Current Status  NT    Goal #4 Patient to demonstrate independence with home activity/exercise instructions provided to patient in preparation for discharge.   Goal #4   Current Status In progress     Therapy activity 2 units

## 2024-02-05 NOTE — SLP NOTE
ADULT SWALLOWING EVALUATION    ASSESSMENT    ASSESSMENT/OVERALL IMPRESSION:  PPE REQUIRED. THIS THERAPIST WORE GLOVES AND MASK FOR DURATION OF EVALUATION. HANDS WASHED UPON ENTRANCE/EXIT.    Per MD H&P, \"Margaret Silverio is a 78 year old female with well-known to us from multiple previous admissions she was just discharged on January 20.  She had been admitted for GI bleed and found to have AVMs.  She was discharged off of oral anticoagulation and has a history of atrial fibrillation.  She returns with increased weakness and shortness of breath.\"    SLP BSSE orders received and acknowledged. A swallow evaluation warranted per \"possible aspiration\". Pt afebrile with clear vocal quality, on room air, with oxygen saturation at 100%. Pt with hx of dysphagia at [facility], BSE 11/28/22 with recommendations for soft easy to chew/thin liquids.    Pt positioned 90 degrees in bed, alert/cooperative. Pt with no complaints of pain. Oral motor skills within functional limits. Pt presented with trials of hard solids and thin liquids via straw. Pt with adequate oral acceptance and bilabial seal across all trials. Pt with intact bite, mastication of solids, and timely A-P transit. Pt's swallow response appears timely with reduced hyolaryngeal elevation/excursion. No clinical signs of aspiration (e.g., immediate/delayed throat clear, immediate/delayed cough, wet vocal quality, increased O2 effort) observed across all trials. 2/5 CXR indicates \"Stable cardiomegaly with mild vascular congestion. Mild increased opacification of the right lung base which may reflect small effusion and atelectasis and or pneumonia. Small left pleural effusion\". Oxygen status remained stable t/o the entire evaluation.     At this time, pt presents with functional oral swallow stage and probable pharyngeal dysfunction. Recommend a regular diet and thin liquids with strict adherence to safe swallowing compensatory strategies. Results and recommendations  reviewed with RN, pt. Pt v/u to all results/recommendations. Recommendations remain written on whiteboard. SLP collaborated with RN for MD diet orders.     PLAN: SLP to f/u x1-2 meal assessments, monitor imaging, and VFSS if clinically indicated         RECOMMENDATIONS   Diet Recommendations - Solids: Regular  Diet Recommendations - Liquids: Thin Liquids                        Compensatory Strategies Recommended: Slow rate  Aspiration Precautions: Upright position;Slow rate  Medication Administration Recommendations:  (as tolerated)  Treatment Plan/Recommendations: Aspiration precautions  Discharge Recommendations/Plan: Undetermined    HISTORY   MEDICAL HISTORY  Reason for Referral:  (\"possible aspiration\")    Problem List  Principal Problem:    Acute on chronic congestive heart failure, unspecified heart failure type (HCC)      Past Medical History  Past Medical History:   Diagnosis Date    Anemia     Arrhythmia     Arthritis     Deep vein thrombosis (HCC)     Essential hypertension     High blood pressure     History of blood transfusion     Seizure disorder (HCC)     Seizures (HCC)        Prior Living Situation: Home with support  Diet Prior to Admission: Regular;Thin liquids  Precautions: Aspiration    Patient/Family Goals: did not state    SWALLOWING HISTORY  Current Diet Consistency: Regular;Thin liquids  Dysphagia History: see above  Imaging Results: see above    SUBJECTIVE       OBJECTIVE   ORAL MOTOR EXAMINATION  Dentition: Functional  Symmetry: Within Functional Limits  Strength: Within Functional Limits  Tone: Within Functional Limits  Range of Motion: Within Functional Limits  Rate of Motion: Within Functional Limits    Voice Quality: Clear  Respiratory Status: Unlabored  Consistencies Trialed: Thin liquids;Hard solid  Method of Presentation: Self presentation;Staff/Clinician assistance  Patient Positioning: Upright;Midline    Oral Phase of Swallow: Within Functional Limits                       Pharyngeal Phase of Swallow: Impaired  Laryngeal Elevation Timing: Appears intact  Laryngeal Elevation Strength: Appears impaired  Laryngeal Elevation Coordination: Appears intact  (Please note: Silent aspiration cannot be evaluated clinically. Videofluoroscopic Swallow Study is required to rule-out silent aspiration.)    Esophageal Phase of Swallow: No complaints consistent with possible esophageal involvement  Comments: NA              GOALS  Goal #1 The patient will tolerate regular consistency and thin liquids without overt signs or symptoms of aspiration with 100 % accuracy over 1-2 session(s).  In Progress   Goal #2 The patient/family/caregiver will demonstrate understanding and implementation of aspiration precautions and swallow strategies independently over 1-2 session(s).    In Progress     FOLLOW UP  Treatment Plan/Recommendations: Aspiration precautions  Number of Visits to Meet Established Goals:  (1-2)  Follow Up Needed (Documentation Required): Yes  SLP Follow-up Date: 02/06/24    Thank you for your referral.   If you have any questions, please contact BROOK Gamboa M.S. CCC-SLP  Speech Language Pathologist  Phone Number [phone]

## 2024-02-06 PROBLEM — D61.818 PANCYTOPENIA (HCC): Status: ACTIVE | Noted: 2024-01-01

## 2024-02-06 PROBLEM — D61.818 PANCYTOPENIA (HCC): Status: ACTIVE | Noted: 2024-02-06

## 2024-02-06 LAB
ANION GAP SERPL CALC-SCNC: 11 MMOL/L (ref 0–18)
BASOPHILS # BLD AUTO: 0.02 X10(3) UL (ref 0–0.2)
BASOPHILS NFR BLD AUTO: 1 %
BUN BLD-MCNC: 24 MG/DL (ref 9–23)
BUN/CREAT SERPL: 19.2 (ref 10–20)
CALCIUM BLD-MCNC: 8.5 MG/DL (ref 8.7–10.4)
CHLORIDE SERPL-SCNC: 106 MMOL/L (ref 98–112)
CO2 SERPL-SCNC: 22 MMOL/L (ref 21–32)
CREAT BLD-MCNC: 1.25 MG/DL
DEPRECATED RDW RBC AUTO: 61.5 FL (ref 35.1–46.3)
EGFRCR SERPLBLD CKD-EPI 2021: 44 ML/MIN/1.73M2 (ref 60–?)
EOSINOPHIL # BLD AUTO: 0.04 X10(3) UL (ref 0–0.7)
EOSINOPHIL NFR BLD AUTO: 2 %
ERYTHROCYTE [DISTWIDTH] IN BLOOD BY AUTOMATED COUNT: 19.7 % (ref 11–15)
GLUCOSE BLD-MCNC: 82 MG/DL (ref 70–99)
HCT VFR BLD AUTO: 26.6 %
HGB BLD-MCNC: 8.6 G/DL
HGB RETIC QN AUTO: 38.3 PG (ref 28.2–36.6)
IGA SERPL-MCNC: 271.6 MG/DL (ref 40–350)
IGM SERPL-MCNC: 164.6 MG/DL (ref 50–300)
IMM GRANULOCYTES # BLD AUTO: 0.02 X10(3) UL (ref 0–1)
IMM GRANULOCYTES NFR BLD: 1 %
IMM RETICS NFR: 0.09 RATIO (ref 0.1–0.3)
IMMUNOGLOBULIN PNL SER-MCNC: 1277 MG/DL (ref 650–1600)
LYMPHOCYTES # BLD AUTO: 0.67 X10(3) UL (ref 1–4)
LYMPHOCYTES NFR BLD AUTO: 33.8 %
MAGNESIUM SERPL-MCNC: 1.5 MG/DL (ref 1.6–2.6)
MCH RBC QN AUTO: 30.7 PG (ref 26–34)
MCHC RBC AUTO-ENTMCNC: 32.3 G/DL (ref 31–37)
MCV RBC AUTO: 95 FL
MONOCYTES # BLD AUTO: 0.02 X10(3) UL (ref 0.1–1)
MONOCYTES NFR BLD AUTO: 1 %
NEUTROPHILS # BLD AUTO: 1.21 X10 (3) UL (ref 1.5–7.7)
NEUTROPHILS # BLD AUTO: 1.21 X10(3) UL (ref 1.5–7.7)
NEUTROPHILS NFR BLD AUTO: 61.2 %
OSMOLALITY SERPL CALC.SUM OF ELEC: 291 MOSM/KG (ref 275–295)
PHOSPHATE SERPL-MCNC: 2.4 MG/DL (ref 2.4–5.1)
PLATELET # BLD AUTO: 44 10(3)UL (ref 150–450)
POTASSIUM SERPL-SCNC: 4.5 MMOL/L (ref 3.5–5.1)
RBC # BLD AUTO: 2.8 X10(6)UL
RETICS # AUTO: 5.5 X10(3) UL (ref 22.5–147.5)
RETICS/RBC NFR AUTO: 0.2 %
SODIUM SERPL-SCNC: 139 MMOL/L (ref 136–145)
WBC # BLD AUTO: 2 X10(3) UL (ref 4–11)

## 2024-02-06 PROCEDURE — 99223 1ST HOSP IP/OBS HIGH 75: CPT | Performed by: INTERNAL MEDICINE

## 2024-02-06 PROCEDURE — 99233 SBSQ HOSP IP/OBS HIGH 50: CPT | Performed by: INTERNAL MEDICINE

## 2024-02-06 RX ORDER — BISACODYL 10 MG
10 SUPPOSITORY, RECTAL RECTAL
Status: DISCONTINUED | OUTPATIENT
Start: 2024-02-06 | End: 2024-02-18

## 2024-02-06 RX ORDER — BUMETANIDE 0.25 MG/ML
1 INJECTION INTRAMUSCULAR; INTRAVENOUS ONCE
Status: COMPLETED | OUTPATIENT
Start: 2024-02-06 | End: 2024-02-06

## 2024-02-06 RX ORDER — POLYETHYLENE GLYCOL 3350 17 G/17G
17 POWDER, FOR SOLUTION ORAL DAILY PRN
Status: DISCONTINUED | OUTPATIENT
Start: 2024-02-06 | End: 2024-02-18

## 2024-02-06 RX ORDER — TRAMADOL HYDROCHLORIDE 50 MG/1
50 TABLET ORAL EVERY 6 HOURS PRN
Status: DISCONTINUED | OUTPATIENT
Start: 2024-02-06 | End: 2024-02-16

## 2024-02-06 RX ORDER — DOCUSATE SODIUM 100 MG/1
100 CAPSULE, LIQUID FILLED ORAL 2 TIMES DAILY
Status: DISCONTINUED | OUTPATIENT
Start: 2024-02-06 | End: 2024-02-08

## 2024-02-06 RX ORDER — ENEMA 19; 7 G/133ML; G/133ML
1 ENEMA RECTAL ONCE AS NEEDED
Status: DISCONTINUED | OUTPATIENT
Start: 2024-02-06 | End: 2024-02-18

## 2024-02-06 NOTE — CM/SW NOTE
02/06/24 1200   Choice of Post-Acute Provider   Informed patient of right to choose their preferred provider Yes   List of appropriate post-acute services provided to patient/family with quality data Yes   Patient/family choice [facility]   Information given to Patient;Spouse/Significant other;Daughter     Social work was able to follow up on the [facility] choice.    The patient and family have decided to go with [facility].    Social work has requested insurance auth to be submitted by the DSC.     Social work will follow up on auth approval.    BARDEN/ALEXEY to remain available for support and/or discharge planning.     Lin Borges MSW, LSW  Discharge Planner [id number]

## 2024-02-06 NOTE — CM/SW NOTE
Submitted clinical via [employer] portal.  [employer] Case/Auth ID is [id number].  Final insurance authorization is pending at this time.      SW/CM assigned to the case will continue to follow auth status.      Nataliya Luna, DSC      Addendum:  - [employer] ID [id number], approved from 2/6 to 2/8.    Notified LSW / KUSH      er

## 2024-02-06 NOTE — PLAN OF CARE
Patient up to chair for dinner today, worked with physical therapy. SLP eval done at bedside. Pain controlled with oral norco. Pacemaker interrogated this afternoon.     Problem: Patient Centered Care  Goal: Patient preferences are identified and integrated in the patient's plan of care  Description: Interventions:  - What would you like us to know as we care for you? I live with my  and two grandchildren  - Provide timely, complete, and accurate information to patient/family  - Incorporate patient and family knowledge, values, beliefs, and cultural backgrounds into the planning and delivery of care  - Encourage patient/family to participate in care and decision-making at the level they choose  - Honor patient and family perspectives and choices  Outcome: Progressing     Problem: Patient/Family Goals  Goal: Patient/Family Long Term Goal  Description: Patient's Long Term Goal: To get better    Interventions:  - IV diuretics, prn O2 supplementation, cardiac monitoring and electrolyte protocol  - See additional Care Plan goals for specific interventions  Outcome: Progressing  Goal: Patient/Family Short Term Goal  Description: Patient's Short Term Goal: To breathe better    Interventions:   - IV diuretics, O2 supplementation prn, cardiac monitoring, electrolyte protocol  - See additional Care Plan goals for specific interventions  Outcome: Progressing     Problem: CARDIOVASCULAR - ADULT  Goal: Maintains optimal cardiac output and hemodynamic stability  Description: INTERVENTIONS:  - Monitor vital signs, rhythm, and trends  - Monitor for bleeding, hypotension and signs of decreased cardiac output  - Evaluate effectiveness of vasoactive medications to optimize hemodynamic stability  - Monitor arterial and/or venous puncture sites for bleeding and/or hematoma  - Assess quality of pulses, skin color and temperature  - Assess for signs of decreased coronary artery perfusion - ex. Angina  - Evaluate fluid balance,  assess for edema, trend weights  Outcome: Progressing  Goal: Absence of cardiac arrhythmias or at baseline  Description: INTERVENTIONS:  - Continuous cardiac monitoring, monitor vital signs, obtain 12 lead EKG if indicated  - Evaluate effectiveness of antiarrhythmic and heart rate control medications as ordered  - Initiate emergency measures for life threatening arrhythmias  - Monitor electrolytes and administer replacement therapy as ordered  Outcome: Progressing     Problem: RESPIRATORY - ADULT  Goal: Achieves optimal ventilation and oxygenation  Description: INTERVENTIONS:  - Assess for changes in respiratory status  - Assess for changes in mentation and behavior  - Position to facilitate oxygenation and minimize respiratory effort  - Oxygen supplementation based on oxygen saturation or ABGs  - Provide Smoking Cessation handout, if applicable  - Encourage broncho-pulmonary hygiene including cough, deep breathe, Incentive Spirometry  - Assess the need for suctioning and perform as needed  - Assess and instruct to report SOB or any respiratory difficulty  - Respiratory Therapy support as indicated  - Manage/alleviate anxiety  - Monitor for signs/symptoms of CO2 retention  Outcome: Progressing     Problem: GENITOURINARY - ADULT  Goal: Absence of urinary retention  Description: INTERVENTIONS:  - Assess patient’s ability to void and empty bladder  - Monitor intake/output and perform bladder scan as needed  - Follow urinary retention protocol/standard of care  - Consider collaborating with pharmacy to review patient's medication profile  - Implement strategies to promote bladder emptying  Outcome: Progressing     Problem: Impaired Functional Mobility  Goal: Achieve highest/safest level of mobility/gait  Description: Interventions:  - Assess patient's functional ability and stability  - Promote increasing activity/tolerance for mobility and gait  - Educate and engage patient/family in tolerated activity level and  precautions  Outcome: Progressing

## 2024-02-06 NOTE — PHYSICAL THERAPY NOTE
PHYSICAL THERAPY TREATMENT NOTE - INPATIENT     Room Number: 332/332-A       Presenting Problem: acute on chronic CHF, weakness and shortness of breath. Fell out of bed.  Co-Morbidities : pacemaker; RA    Problem List  Principal Problem:    Acute on chronic congestive heart failure, unspecified heart failure type (HCC)      PHYSICAL THERAPY ASSESSMENT   Patient demonstrates fair progress this session, goals  remain in progress.    Patient continues to function below baseline with bed mobility, transfers, gait, stair negotiation, maintaining seated position, standing prolonged periods, and performing household tasks.  Contributing factors to remaining limitations include ROM (deficits and deformity primarily in hands associated with arthritis).  Next session anticipate patient to progress bed mobility, transfers, maintaining seated position, and standing prolonged periods.  Physical Therapy will continue to follow patient for duration of hospitalization.    Patient continues to benefit from continued skilled PT services: to promote return to prior level of function and safety with continuous assistance and gradual rehabilitative therapy .    PLAN  PT Treatment Plan: Bed mobility;Body mechanics;Endurance;Energy conservation;Patient education;Family education;Balance training;Transfer training;Range of motion;Strengthening  Frequency (Obs): 3-5x/week    SUBJECTIVE  \"Everything is sore\"     OBJECTIVE  Precautions: Cardiac;Spine;Bed/chair alarm (Pain / RA)      PAIN ASSESSMENT   Ratin  Location: neck pain and \"everything hurts\"  Management Techniques: Activity promotion;Body mechanics;Repositioning    BALANCE  Static Sitting: Fair -  Dynamic Sitting: Poor +  Static Standing: Poor  Dynamic Standing: Poor -    ACTIVITY TOLERANCE  Pulse: 60  Heart Rate Source: Monitor      O2 WALK  Oxygen Therapy  SPO2% on Room Air at Rest: 98    AM-PAC '6-Clicks' INPATIENT SHORT FORM - BASIC MOBILITY  How much difficulty does the  patient currently have...  Patient Difficulty: Turning over in bed (including adjusting bedclothes, sheets and blankets)?: A Lot   Patient Difficulty: Sitting down on and standing up from a chair with arms (e.g., wheelchair, bedside commode, etc.): A Lot   Patient Difficulty: Moving from lying on back to sitting on the side of the bed?: A Lot   How much help from another person does the patient currently need...   Help from Another: Moving to and from a bed to a chair (including a wheelchair)?: A Lot   Help from Another: Need to walk in hospital room?: A Lot   Help from Another: Climbing 3-5 steps with a railing?: Total     AM-PAC Score:  Raw Score: 11   Approx Degree of Impairment: 72.57%   Standardized Score (AM-PAC Scale): 33.86   CMS Modifier (G-Code): CL    FUNCTIONAL ABILITY STATUS  Functional Mobility/Gait Assessment  Gait Assistance: Not tested  Distance (ft): partial stand pivot transfer to right bed to chair   unable to achieve full stand ---gait belt 2A  no AD used  Supine to Sit: moderate assist and maximum assist assist with LE's and trunk. Mod A to initiate movement, Max A x 1 to complete.   Sit to Stand: maximum assist and assist x 2    Additional information: Patient received supine in bed. RN approved activity.   Patient tolerated sitting EOB for 12 minutes requiring initial Min A to maintain balance improving to CGA as time in sitting increased. Cues for upright posture.   Bed>chair transfer: Max A x 2 stand pivot transfer technique with use of gait belt.   Sit<>stand from chair: Max A x 2. Cues for appropriate UE positioning with transitional movements. Cues for anterior<>posterior weight shifts to gain momentum to stand. Instruction on upright posture- glute sets and repositioning of bilateral LE's. Required Mod A x 1 to maintain static standing balance ~3 minutes.     The patient's Approx Degree of Impairment: 72.57% has been calculated based on documentation in the [facility] '6 clicks' Inpatient  Daily Activity Short Form.  Research supports that patients with this level of impairment may benefit from rehab.  Final disposition will be made by interdisciplinary medical team.    THERAPEUTIC EXERCISES  Lower Extremity Ankle pumps  LAQ     Position Sitting       Patient End of Session: Up in chair;Needs met;Call light within reach;RN aware of session/findings;Alarm set. Due to bilateral hand pain, provided with head packs for comfort.     CURRENT GOALS   Goals to be met by: 2/17/24  Patient Goal Patient's self-stated goal is: return to PLOF   Goal #1 Patient is able to demonstrate supine - sit EOB @ level: CGA      Goal #1   Current Status  Mod A x 1/Max A x 1   Goal #2 Patient is able to demonstrate transfers EOB to/from Chair/Wheelchair at assistance level: minimum assistance with walker - rolling      Goal #2  Current Status  Max A x 1 SPT transfer bed>chair   Goal #3 Patient is able to ambulate 25 feet with assist device: walker - rolling at assistance level: minimum assistance   Goal #3   Current Status  NT    Goal #4 Patient to demonstrate independence with home activity/exercise instructions provided to patient in preparation for discharge.   Goal #4   Current Status In progress      Therapeutic Activity: 24 minutes

## 2024-02-06 NOTE — CONSULTS
Hematology Oncology Consult Note      Margaret Silverio Patient Status:  Inpatient    3/14/1945 MRN [id number]   Location [facility] 3W/SW Attending Danielle Barber MD   Hosp Day # 3 PCP Johnathon Rodriguez DO     Reason for Consultation: pancytopenia    History of Present Illness:  Margaret Silverio is a 78 year old Black or  female, known to me from previous admission, history of HTN, CHF/NICM s/p ICD 2023, A fib on anticoagulation with eliquis, rheumatoid arthritis on methotrexate, CKD, GI bleed, osteoporosis, seizure disorder, hospitalized last month for UGI bleed secondary to AVM, now admitted on 24 for acute on chronic heart failure being treated with IV bumex, entresto and bb. Labs on admission notable for wbc 4.2, hgb 9.5, plt 184, hematology consulted for worsening pancytopenia. Labs today with wbc 2, hgb 8.6, plt 44. DOAC on hold. No s/s of active bleeding. Patient is poor historian, appears very lethargic. C/o generalized weakness and neck pain. No family at the bedside.       History:  Past Medical History:   Diagnosis Date    Anemia     Arrhythmia     Arthritis     Deep vein thrombosis (HCC)     Essential hypertension     High blood pressure     History of blood transfusion     Seizure disorder (HCC)     Seizures (HCC)      Past Surgical History:   Procedure Laterality Date    COLONOSCOPY N/A 2024    Dr. Rios    COLONOSCOPY N/A 2024    Procedure: COLONOSCOPY;  Surgeon: Zoraida Rios MD;  Location: [facility] ENDOSCOPY    EGD N/A 2024    Dr. Rios    OTHER SURGICAL HISTORY      Heart Surgery     OTHER SURGICAL HISTORY      Bilateral shoulder replacement      No family history on file.   reports that she quit smoking about 10 years ago. Her smoking use included cigarettes. She smoked an average of 0.3 packs per day. She has never used smokeless tobacco. She reports that she does not drink alcohol and does not use drugs.    Allergies:  Allergies    Allergen Reactions    Lisinopril Coughing       Medications:    Current Facility-Administered Medications:     sodium phosphate 15 mmol in 0.9% NaCl 100mL IVPB premix, 15 mmol, Intravenous, Once    amitriptyline (Elavil) tab 25 mg, 25 mg, Oral, Nightly    carvedilol (Coreg) tab 25 mg, 25 mg, Oral, BID    sacubitril-valsartan (Entresto) 24-26 MG per tab 1 tablet, 1 tablet, Oral, BID    ferrous sulfate DR tab 325 mg, 325 mg, Oral, Daily with breakfast    folic acid (Folvite) tab 800 mcg, 800 mcg, Oral, Daily    HYDROcodone-acetaminophen (Norco)  MG per tab 1 tablet, 1 tablet, Oral, Q6H PRN    pantoprazole (Protonix) DR tab 40 mg, 40 mg, Oral, BID AC    bumetanide (Bumex) 0.25 MG/ML injection 1 mg, 1 mg, Intravenous, BID (Diuretic)    [START ON 2/7/2024] alendronate (Fosamax) tab 70 mg, 70 mg, Oral, Weekly    Review of Systems:  A 12-point review of systems was done with pertinent positives and negatives per the HPI.    Weight:  Wt Readings from Last 6 Encounters:   02/06/24 68.4 kg (150 lb 14.4 oz)   01/20/24 69.7 kg (153 lb 11.2 oz)   12/26/23 65.8 kg (145 lb)   09/21/23 65.8 kg (145 lb)   08/21/23 64.3 kg (141 lb 12.8 oz)   07/17/23 64 kg (141 lb)       Vital Sings:  /69 (BP Location: Right arm)   Pulse 60   Temp 98 °F (36.7 °C) (Oral)   Resp 16   Wt 68.4 kg (150 lb 14.4 oz)   SpO2 99%   BMI 25.11 kg/m²     Physical Exam:   General: Lethargic, arousable, AOx 3, not in acute distress.   HEENT: Anicteric sclera. Pink conjunctiva. Oropharynx is clear.   Neck: No JVD. No palpable lymphadenopathy. Neck is supple.  Chest: Clear to auscultation.  No rales or wheezes.  Heart: Irregularly irregular  Abdomen: Soft, non tender with good bowel sounds.   Extremities: 2+ pitting edema up the knees bilaterally. B/l hand deformity from RA  Neurological: no assessed.     Laboratory Data:    Recent Labs   Lab 02/04/24  0641 02/05/24  0635 02/06/24  0742   RBC 2.79* 2.95* 2.80*   HGB 8.4* 9.3* 8.6*   HCT 25.8*  27.6* 26.6*   MCV 92.5 93.6 95.0   MCH 30.1 31.5 30.7   MCHC 32.6 33.7 32.3   RDW 19.1* 19.4* 19.7*   NEPRELIM 3.19 2.17 1.21*   WBC 3.7* 3.0* 2.0*   PLT 97.0* 63.0* 44.0*       Recent Labs   Lab 02/04/24  0641 02/05/24  0635 02/06/24  0742   GLU 86 88 82   BUN 19 17 24*   CREATSERUM 1.34* 1.27* 1.25*   EGFRCR 41* 44* 44*   CA 8.3* 8.4* 8.5*    136 139   K 4.4 4.4 4.5    105 106   CO2 25.0 24.0 22.0        No results for input(s): \"PT\", \"INR\", \"PTT\" in the last 168 hours.     Imaging:  XR CHEST PA + LAT CHEST (CPT=71046)    Result Date: 2/5/2024  CONCLUSION:   Stable cardiomegaly with mild vascular congestion.  Mild increased opacification of the right lung base which may reflect small effusion and atelectasis and or pneumonia.  Small left pleural effusion.    Dictated by (CST): Azar Mendez MD on 2/05/2024 at 11:22 AM     Finalized by (CST): zAar Mendez MD on 2/05/2024 at 11:26 AM          XR LUMBAR SPINE (MIN 4 VIEWS) (CPT=72110)    Result Date: 2/3/2024  CONCLUSION:  1. No radiographically visible acute osseous injury of the lumbar spine. 2. Mild-to-moderate multilevel lumbar spine degenerative changes. 3. Mild levoscoliosis of the lumbar spine. 4. Demineralization. 5. Lesser incidental findings as above.   Dictated by (CST): Aleks Diana MD on 2/03/2024 at 8:25 PM     Finalized by (CST): Aleks Diana MD on 2/03/2024 at 8:27 PM          XR SHOULDER, COMPLETE (MIN 2 VIEWS), LEFT (CPT=73030)    Result Date: 2/3/2024  CONCLUSION:  1. No radiographically visible acute osseous injury of the left shoulder. 2. Reverse left shoulder arthroplasty, which appears radiographically uncomplicated. 3. Demineralization. 4. Partially imaged left chest wall pacemaker.   Dictated by (CST): Aleks Diana MD on 2/03/2024 at 8:24 PM     Finalized by (CST): Aleks Diana MD on 2/03/2024 at 8:25 PM          XR SHOULDER, COMPLETE (MIN 2 VIEWS), RIGHT (CPT=73030)    Result Date: 2/3/2024  CONCLUSION:  1.  No radiographically visible acute osseous injury of the right shoulder. 2. Postsurgical changes of right reverse shoulder arthroplasty with radiographically uncomplicated hardware. 3. Mild acromioclavicular joint osteoarthritis. 4. Partially imaged hazy opacity at the right lung base, which is better assessed on recent prior chest radiograph.   Dictated by (CST): Aleks Diana MD on 2/03/2024 at 8:22 PM     Finalized by (CST): Aleks Diana MD on 2/03/2024 at 8:24 PM              Impression and Plan:      78 year old AAF with acute on chronic CHF, chronic anemia, hematology consulted for worsening pancytopenia similar to recent admission:    Acute on chronic anemia   - chronic anemia multifactorial; anemia of chronic disease, renal failure, drug induced (methotrexate), recent GI blood loss in the setting of anticoagulation  - s/p EGD/CLN 1/17 small transverse colon AVM s/p APC likely was the source of bleeding.   - prior BMBx was negative per patient- done in [place] few years ago. no records available.   - recent work up showed no evidence of hemolysis, or nutritional deficiency.   - currently no sings or symptoms of active bleeding.   - will check reticulocyte counts, sTR and SPEP for complete work up.   - consider watchman's given h/o GIB with anticoagulation.   - monitor CBC and transfuse as needed to keep hgb => 8 due to cardiac disease     Thrombocytopenia  - severe acute thrombocytopenia, with normal count at baseline  - probably consumptive in the setting of acute illness and heart failure, similar to recent admission.   - peripheral smear with no platelet clumps or schistocytes.  - DOAC on hold, continue to monitor and transfuse if Plt <20 or <50 with bleeding.      Leukopenia  - mainly lymphopenia, likely drug induced vs autoimmune   - may need repeat BMBx if cytopenias persist after her acute issues resolve      Rheumatoid arthritis   - on methotrexate > 20 yrs and humira added 2021        Thank you  for the opportunity to participate in the care of Margaret Silverio. Will continue to follow along with you.   Please contact me for any questions or concerns.       Briana Snyder MD  [facility]

## 2024-02-06 NOTE — PROGRESS NOTES
Progress Note  Margaret Silverio Patient Status:  Inpatient    3/14/1945 MRN [id number]   Location [facility] 3W/SW Attending Danielle Barber MD   Hosp Day # 3 PCP Johnathon Rodriguez,      Subjective:  Denies any chest pain, SOB or dizziness      Objective:  /75 (BP Location: Right arm)   Pulse 61   Temp 97.5 °F (36.4 °C) (Oral)   Resp 16   Wt 149 lb 6.4 oz (67.8 kg)   SpO2 99%   BMI 24.86 kg/m²     Telemetry: AV paced       Intake/Output:    Intake/Output Summary (Last 24 hours) at 2024 1103  Last data filed at 2024 0918  Gross per 24 hour   Intake 340 ml   Output 760 ml   Net -420 ml       Last 3 Weights   24 0400 149 lb 6.4 oz (67.8 kg)   24 0635 148 lb 14.4 oz (67.5 kg)   24 0453 149 lb 9.6 oz (67.9 kg)   24 0032 149 lb 14.4 oz (68 kg)   24 2053 135 lb (61.2 kg)   24 0536 153 lb 11.2 oz (69.7 kg)   24 0441 151 lb 8 oz (68.7 kg)   24 0535 146 lb (66.2 kg)   24 1312 146 lb (66.2 kg)   24 0432 146 lb (66.2 kg)   24 0430 151 lb (68.5 kg)   01/15/24 0536 153 lb (69.4 kg)   24 0119 150 lb 5.7 oz (68.2 kg)   24 2034 140 lb (63.5 kg)   23 1412 145 lb (65.8 kg)       Labs:  Recent Labs   Lab 24  0626 24  0227 24  0641 24  0635   *  --  86 88   BUN 18  --  19 17   CREATSERUM 1.48*  --  1.34* 1.27*   EGFRCR 36*  --  41* 44*   CA 8.3*  --  8.3* 8.4*     --  138 136   K 3.6 4.2 4.4 4.4     --  106 105   CO2 22.0  --  25.0 24.0     Recent Labs   Lab 2441 24  0635   RBC 3.13* 2.75* 2.79* 2.95*   HGB 9.5* 8.4* 8.4* 9.3*   HCT 29.8* 25.5* 25.8* 27.6*   MCV 95.2 92.7 92.5 93.6   MCH 30.4 30.5 30.1 31.5   MCHC 31.9 32.9 32.6 33.7   RDW 19.9* 19.9* 19.1* 19.4*   NEPRELIM 3.77  --  3.19 2.17   WBC 4.2 4.0 3.7* 3.0*   .0 147.0* 97.0* 63.0*         Recent Labs   Lab 24   TROPHS 34     Lab Results   Component  Value Date    INR 1.60 (H) 01/15/2024    INR 2.60 (H) 2024    INR 3.63 (H) 2024       Diagnostics:       Review of Systems   Respiratory: Negative.     Cardiovascular: Negative.        Physical Exam:    Physical Exam  Vitals reviewed.   Constitutional:       General: She is not in acute distress.  Neck:      Vascular: No JVD.   Cardiovascular:      Rate and Rhythm: Normal rate and regular rhythm.      Pulses:           Dorsalis pedis pulses are 2+ on the right side and 2+ on the left side.      Heart sounds: S1 normal and S2 normal. Murmur heard.      Systolic murmur is present with a grade of 2/6.   Pulmonary:      Effort: Pulmonary effort is normal.      Breath sounds: No wheezing or rhonchi.      Comments: Crackles bilaterally on asculatation  Musculoskeletal:      Cervical back: Normal range of motion and neck supple.      Right lower le+ Edema present.      Left lower le+ Edema present.   Skin:     General: Skin is warm and dry.   Neurological:      Mental Status: She is alert and oriented to person, place, and time.   Psychiatric:         Mood and Affect: Mood normal.         Medications:   magnesium sulfate  4 g Intravenous Once    amitriptyline  25 mg Oral Nightly    carvedilol  25 mg Oral BID    sacubitril-valsartan  1 tablet Oral BID    ferrous sulfate  325 mg Oral Daily with breakfast    folic acid  800 mcg Oral Daily    pantoprazole  40 mg Oral BID AC    bumetanide  1 mg Intravenous BID (Diuretic)    [START ON 2024] alendronate  70 mg Oral Weekly         Assessment/Plan:    Acute hypoxic respiratory failure likely due to volume overload, CHF  - on RA     Acute on chronic HFrEF  - echo 24 LVEF 35-40% mitral valve bioprostesis present, Sev TR, PASP 43mmHg  - on bumex 1mg IV BID  - net output of 600ml over past 24hr  - wt down 1lb up yesterday, checked on bed scale, accuracy questionable  - on coreg, entresto     NICM s/p ICD  Abott  -Persistent low EF <35% despite 3 months  GDMT (echo from 09/01/23 with LVEF 37%)   - on coreg, Entresto, bumex  - weight 1 lb up from yesterday  - interrogation of device ordered to assess for RV pacing percentage and possible consideration of CRT-D in future     PAF/Aflutter   - rate controlled  - on BB  - DOAC on hold due to GIB  - may need OP evaluation for possible watchman procedure     SSS  S/p DC-ICD 9/23     Bilateral lower extremity edema     CKD3  -creatinine 1.27 today  - improving with diuresis      Recent GIB due to AVM's  - s/p EGD/Colonoscopy 1/17 with small transverse colon AVM   - DOAC on hold for GIB     Chronic anemia  -hgb 9.3     HTN: well controlled        Plan:  - pt still has BLE and bilateral lower lobe crackles, on room air. Will continue with IV Bumex, awaiting BMP result from today. May need to give extra dose of bumex today depending on kidney function.  - closely monitor creatinine   - continue with strict monitoring of intake and output  -prefer standing weight.  -DOAC on hold due to GIB. Would benefit from Watchman procedure given recent GIB, will reintroduce in OP settings, previously declined.   - interrogate device to check to see the percentage of RV pacing.         KODI Mclain  [facility]  2/6/2024  11:03 AM    Addendum:1439  Creatinine stable today. Will give extra dose of bumex 1mg this evening.    Addendum:  Patient seen and examined   Agree with the assessment and the plan  Follow renal panel closely  Follow H/H    L3  Smooth Swain MD

## 2024-02-06 NOTE — PROGRESS NOTES
[facility]    Progress Note    Margaret Silverio Patient Status:  Inpatient    3/14/1945 MRN [id number]   Location [facility] 3W/SW Attending Scott Mendez,*   Hosp Day # 3 PCP Johnathon Rodriguez DO     Chief Complaint: hurt all over    Subjective:   Admits to sob and fatigue  No CP    No n/v/d.    No e/o bleeding    No nursing concerns or overnight events      No nursing concerns or overnight events    Objective:   Blood pressure 131/75, pulse 61, temperature 97.5 °F (36.4 °C), temperature source Oral, resp. rate 16, weight 149 lb 6.4 oz (67.8 kg), SpO2 99%.  Physical Exam  Vitals and nursing note reviewed.   Constitutional:       General: She is not in acute distress.     Appearance: She is ill-appearing. She is not toxic-appearing or diaphoretic.   HENT:      Head: Normocephalic and atraumatic.   Cardiovascular:      Rate and Rhythm: Normal rate and regular rhythm.      Heart sounds: Murmur heard.   Pulmonary:      Effort: Pulmonary effort is normal. No respiratory distress.      Comments: Decreased BS BL  Abdominal:      General: Bowel sounds are normal.      Palpations: Abdomen is soft.      Tenderness: There is no guarding.   Musculoskeletal:         General: Swelling and tenderness present.   Skin:     General: Skin is warm and dry.      Capillary Refill: Capillary refill takes less than 2 seconds.   Neurological:      General: No focal deficit present.      Mental Status: She is alert and oriented to person, place, and time.   Psychiatric:         Behavior: Behavior normal.         Results:   Lab Results   Component Value Date    WBC 3.0 (L) 2024    HGB 9.3 (L) 2024    HCT 27.6 (L) 2024    PLT 63.0 (L) 2024    CREATSERUM 1.27 (H) 2024    BUN 17 2024     2024    K 4.4 2024     2024    CO2 24.0 2024    GLU 88 2024    CA 8.4 (L) 2024    ALB 3.2 2024    ALKPHO 110 2024    BILT  1.6 (H) 01/13/2024    TP 5.6 (L) 01/13/2024    AST 38 (H) 01/13/2024    ALT 15 01/13/2024    PTT 42.1 (H) 01/15/2024    INR 1.60 (H) 01/15/2024    T4F 1.2 02/03/2024    TSH 10.681 (H) 02/03/2024    LIP 32 01/13/2024    MG 1.5 (L) 02/06/2024    PHOS 2.4 02/06/2024    TROPHS 34 02/02/2024    B12 >2,000 (H) 02/03/2024       XR CHEST PA + LAT CHEST (CPT=71046)    Result Date: 2/5/2024  CONCLUSION:   Stable cardiomegaly with mild vascular congestion.  Mild increased opacification of the right lung base which may reflect small effusion and atelectasis and or pneumonia.  Small left pleural effusion.    Dictated by (CST): Azar Mendez MD on 2/05/2024 at 11:22 AM     Finalized by (CST): Azar Mendez MD on 2/05/2024 at 11:26 AM               Assessment & Plan:   Acute on chronic combined CHF  Severe tricuspid valve regurgitation  Bioprosthetic mitral valve  NICM status post ICD in September 2023  Recent echo showed ejection fraction 37% with grade 4 tricuspid regurg  Cardiology is now on consult  Lasix given in the emergency room and started on Bumex 1 mg twice daily diuresis as per cards; legs swollen; cxr no chf  Continue Entresto and carvedilol  2/5/24: waiting interrogation of device; c/u IV bumex per cards, c/u holding DOAC due to recent GIB would benefit from watchman altho has refused in the past, hb stable. Will dc to [facility] once ccleared by cardiology.   2/6/24: poor  in 24 h on current dose- cardiology managing considering increasing dose.     Pancytopenia/ worsening severe acute TCP:  -had similar issue on last admission Dr Snyder was consulted; thought to be consumptive in seeing of acute illness vs drug induced at the time.  -2/6/24:consulted Dr Snyder again today due to similar drop in PLT especially. No e/o bleeding; no schistocytes on smear.    Paroxysmal atrial fibrillation   Hold oral anticoagulation given recent GI bleed  Currently rate controlled  Still considering outpatient Watchman  procedure given her recurrent GI bleeds    Chronic kidney injury stage III: stable  Baseline creatinine is about 1.3  Current creatinine 1.34  Follow urine output    Lower extremity edema  Appears much improved still present          Perceived  acute hypoxic respiratory failure likely 2/2 CHF    - unusual discoid atelectasis on cxr; elevated procalc   -SARS/Influenza, RSV neg 2/2/24  - Start abx if fever or obj start atelectasis tx    Recent GI bleed secondary to AVMs  Chronic anemia  Severe shoulder pain after falls and trying to get pt up; check xrays; pt eval;meds  Deconditioning :  PT/OT rec [facility] pending SW/insurance     Quality:  DVT Mechanical Prophylaxis: scd       Dispo: to [facility] when morel complete          Patient will require inpatient services that will reasonably be expected to span two midnight's based on the clinical documentation in H+P.   Based on patients current state of illness, I anticipate that, after discharge, patient will require TBD.  Complex mdm; coordinating care with nurse and counseling pt and with her permission her  in room about pain/chf/poss pneumonia??    Danielle Barber MD

## 2024-02-06 NOTE — PLAN OF CARE
Problem: Patient Centered Care  Goal: Patient preferences are identified and integrated in the patient's plan of care  Description: Interventions:  - What would you like us to know as we care for you? I live with my  and two grandchildren  - Provide timely, complete, and accurate information to patient/family  - Incorporate patient and family knowledge, values, beliefs, and cultural backgrounds into the planning and delivery of care  - Encourage patient/family to participate in care and decision-making at the level they choose  - Honor patient and family perspectives and choices  Outcome: Progressing     Problem: Patient/Family Goals  Goal: Patient/Family Long Term Goal  Description: Patient's Long Term Goal: To get better    Interventions:  - IV diuretics, prn O2 supplementation, cardiac monitoring and electrolyte protocol  - See additional Care Plan goals for specific interventions  Outcome: Progressing  Goal: Patient/Family Short Term Goal  Description: Patient's Short Term Goal: To breathe better    Interventions:   - IV diuretics, O2 supplementation prn, cardiac monitoring, electrolyte protocol  - See additional Care Plan goals for specific interventions  Outcome: Progressing     Problem: CARDIOVASCULAR - ADULT  Goal: Maintains optimal cardiac output and hemodynamic stability  Description: INTERVENTIONS:  - Monitor vital signs, rhythm, and trends  - Monitor for bleeding, hypotension and signs of decreased cardiac output  - Evaluate effectiveness of vasoactive medications to optimize hemodynamic stability  - Monitor arterial and/or venous puncture sites for bleeding and/or hematoma  - Assess quality of pulses, skin color and temperature  - Assess for signs of decreased coronary artery perfusion - ex. Angina  - Evaluate fluid balance, assess for edema, trend weights  Outcome: Progressing  Goal: Absence of cardiac arrhythmias or at baseline  Description: INTERVENTIONS:  - Continuous cardiac monitoring,  monitor vital signs, obtain 12 lead EKG if indicated  - Evaluate effectiveness of antiarrhythmic and heart rate control medications as ordered  - Initiate emergency measures for life threatening arrhythmias  - Monitor electrolytes and administer replacement therapy as ordered  Outcome: Progressing     Problem: RESPIRATORY - ADULT  Goal: Achieves optimal ventilation and oxygenation  Description: INTERVENTIONS:  - Assess for changes in respiratory status  - Assess for changes in mentation and behavior  - Position to facilitate oxygenation and minimize respiratory effort  - Oxygen supplementation based on oxygen saturation or ABGs  - Provide Smoking Cessation handout, if applicable  - Encourage broncho-pulmonary hygiene including cough, deep breathe, Incentive Spirometry  - Assess the need for suctioning and perform as needed  - Assess and instruct to report SOB or any respiratory difficulty  - Respiratory Therapy support as indicated  - Manage/alleviate anxiety  - Monitor for signs/symptoms of CO2 retention  Outcome: Progressing     Problem: GENITOURINARY - ADULT  Goal: Absence of urinary retention  Description: INTERVENTIONS:  - Assess patient’s ability to void and empty bladder  - Monitor intake/output and perform bladder scan as needed  - Follow urinary retention protocol/standard of care  - Consider collaborating with pharmacy to review patient's medication profile  - Implement strategies to promote bladder emptying  Outcome: Progressing     Problem: Impaired Functional Mobility  Goal: Achieve highest/safest level of mobility/gait  Description: Interventions:  - Assess patient's functional ability and stability  - Promote increasing activity/tolerance for mobility and gait  - Educate and engage patient/family in tolerated activity level and precautions  Outcome: Progressing     Patient vital signs stable.  Call light within reach.  Prompt incontinence care provided.  PRN Norco for pain.  Patient is a max assist.   Fluid restriction maintained. Plan to discharge to [facility] once cleared.

## 2024-02-07 ENCOUNTER — TELEPHONE (OUTPATIENT)
Facility: CLINIC | Age: 79
End: 2024-02-07

## 2024-02-07 LAB
ANION GAP SERPL CALC-SCNC: 8 MMOL/L (ref 0–18)
BASOPHILS # BLD AUTO: 0.03 X10(3) UL (ref 0–0.2)
BASOPHILS NFR BLD AUTO: 1.2 %
BUN BLD-MCNC: 15 MG/DL (ref 9–23)
BUN/CREAT SERPL: 14.2 (ref 10–20)
CALCIUM BLD-MCNC: 8.2 MG/DL (ref 8.7–10.4)
CHLORIDE SERPL-SCNC: 105 MMOL/L (ref 98–112)
CO2 SERPL-SCNC: 24 MMOL/L (ref 21–32)
CREAT BLD-MCNC: 1.06 MG/DL
DEPRECATED RDW RBC AUTO: 59 FL (ref 35.1–46.3)
EGFRCR SERPLBLD CKD-EPI 2021: 54 ML/MIN/1.73M2 (ref 60–?)
EOSINOPHIL # BLD AUTO: 0.1 X10(3) UL (ref 0–0.7)
EOSINOPHIL NFR BLD AUTO: 4 %
ERYTHROCYTE [DISTWIDTH] IN BLOOD BY AUTOMATED COUNT: 19.5 % (ref 11–15)
GLUCOSE BLD-MCNC: 88 MG/DL (ref 70–99)
HCT VFR BLD AUTO: 25.9 %
HGB BLD-MCNC: 8.6 G/DL
IMM GRANULOCYTES # BLD AUTO: 0.02 X10(3) UL (ref 0–1)
IMM GRANULOCYTES NFR BLD: 0.8 %
LYMPHOCYTES # BLD AUTO: 0.61 X10(3) UL (ref 1–4)
LYMPHOCYTES NFR BLD AUTO: 24.1 %
MAGNESIUM SERPL-MCNC: 2 MG/DL (ref 1.6–2.6)
MCH RBC QN AUTO: 31.3 PG (ref 26–34)
MCHC RBC AUTO-ENTMCNC: 33.2 G/DL (ref 31–37)
MCV RBC AUTO: 94.2 FL
MONOCYTES # BLD AUTO: 0.04 X10(3) UL (ref 0.1–1)
MONOCYTES NFR BLD AUTO: 1.6 %
NEUTROPHILS # BLD AUTO: 1.73 X10 (3) UL (ref 1.5–7.7)
NEUTROPHILS # BLD AUTO: 1.73 X10(3) UL (ref 1.5–7.7)
NEUTROPHILS NFR BLD AUTO: 68.3 %
OSMOLALITY SERPL CALC.SUM OF ELEC: 284 MOSM/KG (ref 275–295)
PHOSPHATE SERPL-MCNC: 2.5 MG/DL (ref 2.4–5.1)
PLATELET # BLD AUTO: 32 10(3)UL (ref 150–450)
POTASSIUM SERPL-SCNC: 4.1 MMOL/L (ref 3.5–5.1)
RBC # BLD AUTO: 2.75 X10(6)UL
SODIUM SERPL-SCNC: 137 MMOL/L (ref 136–145)
SOL TRANSFERRIN RECEPTOR: 25.6 NMOL/L
WBC # BLD AUTO: 2.5 X10(3) UL (ref 4–11)

## 2024-02-07 PROCEDURE — 99232 SBSQ HOSP IP/OBS MODERATE 35: CPT | Performed by: INTERNAL MEDICINE

## 2024-02-07 PROCEDURE — 99233 SBSQ HOSP IP/OBS HIGH 50: CPT | Performed by: HOSPITALIST

## 2024-02-07 NOTE — PLAN OF CARE
Patient had electrolytes replaced today. Norco given for pain management as well as postioning and hot packs. Hematology added as consult. Patient up to chair with therapy. Plan for [facility] [place] when patient is cleared for discharge.    Problem: Patient Centered Care  Goal: Patient preferences are identified and integrated in the patient's plan of care  Description: Interventions:  - What would you like us to know as we care for you? I live with my  and two grandchildren  - Provide timely, complete, and accurate information to patient/family  - Incorporate patient and family knowledge, values, beliefs, and cultural backgrounds into the planning and delivery of care  - Encourage patient/family to participate in care and decision-making at the level they choose  - Honor patient and family perspectives and choices  Outcome: Progressing     Problem: Patient/Family Goals  Goal: Patient/Family Long Term Goal  Description: Patient's Long Term Goal: To get better    Interventions:  - IV diuretics, prn O2 supplementation, cardiac monitoring and electrolyte protocol  - See additional Care Plan goals for specific interventions  Outcome: Progressing  Goal: Patient/Family Short Term Goal  Description: Patient's Short Term Goal: To breathe better    Interventions:   - IV diuretics, O2 supplementation prn, cardiac monitoring, electrolyte protocol  - See additional Care Plan goals for specific interventions  Outcome: Progressing     Problem: CARDIOVASCULAR - ADULT  Goal: Maintains optimal cardiac output and hemodynamic stability  Description: INTERVENTIONS:  - Monitor vital signs, rhythm, and trends  - Monitor for bleeding, hypotension and signs of decreased cardiac output  - Evaluate effectiveness of vasoactive medications to optimize hemodynamic stability  - Monitor arterial and/or venous puncture sites for bleeding and/or hematoma  - Assess quality of pulses, skin color and temperature  - Assess for signs of  decreased coronary artery perfusion - ex. Angina  - Evaluate fluid balance, assess for edema, trend weights  Outcome: Progressing  Goal: Absence of cardiac arrhythmias or at baseline  Description: INTERVENTIONS:  - Continuous cardiac monitoring, monitor vital signs, obtain 12 lead EKG if indicated  - Evaluate effectiveness of antiarrhythmic and heart rate control medications as ordered  - Initiate emergency measures for life threatening arrhythmias  - Monitor electrolytes and administer replacement therapy as ordered  Outcome: Progressing     Problem: RESPIRATORY - ADULT  Goal: Achieves optimal ventilation and oxygenation  Description: INTERVENTIONS:  - Assess for changes in respiratory status  - Assess for changes in mentation and behavior  - Position to facilitate oxygenation and minimize respiratory effort  - Oxygen supplementation based on oxygen saturation or ABGs  - Provide Smoking Cessation handout, if applicable  - Encourage broncho-pulmonary hygiene including cough, deep breathe, Incentive Spirometry  - Assess the need for suctioning and perform as needed  - Assess and instruct to report SOB or any respiratory difficulty  - Respiratory Therapy support as indicated  - Manage/alleviate anxiety  - Monitor for signs/symptoms of CO2 retention  Outcome: Progressing     Problem: GENITOURINARY - ADULT  Goal: Absence of urinary retention  Description: INTERVENTIONS:  - Assess patient’s ability to void and empty bladder  - Monitor intake/output and perform bladder scan as needed  - Follow urinary retention protocol/standard of care  - Consider collaborating with pharmacy to review patient's medication profile  - Implement strategies to promote bladder emptying  Outcome: Progressing     Problem: Impaired Functional Mobility  Goal: Achieve highest/safest level of mobility/gait  Description: Interventions:  - Assess patient's functional ability and stability  - Promote increasing activity/tolerance for mobility and  gait  - Educate and engage patient/family in tolerated activity level and precautions  - Recommend use of sit-stand lift for transfers  - Recommend use of total lift for transfers  Outcome: Progressing

## 2024-02-07 NOTE — TELEPHONE ENCOUNTER
Tiffany SALGADO    Patient's daughter called to inform us that she is inpatient at [facility] at this time.  We had tried to call her several times after past discharge to set up discharge clinic appointment and couldn't get in touch with her at the time to set it up.    Thank you

## 2024-02-07 NOTE — PROGRESS NOTES
Hematology Oncology Progress Note    Patient Name: Margaret Silverio   YOB: 1945   Medical Record Number: [id number]   CSN: [id number]   Attending Physician: Briana Snyder MD     Subjective:  No acute events. Still very weak and lethargic.  C/o headache and neck pain. No bowel movement.   No bleeding issues reported. Leg swelling improving.   No dyspnea at rest, chest pain, fever.     Objective:  Vitals:  Vitals:    02/06/24 1759 02/06/24 2135 02/07/24 0457 02/07/24 0811   BP: 120/84 119/68 124/66 116/65   BP Location: Right arm Right arm Right arm Right arm   Pulse:  60 60 60   Resp: 16 17 18 18   Temp:  97.7 °F (36.5 °C) 97.2 °F (36.2 °C) 97.4 °F (36.3 °C)   TempSrc:  Oral Oral Oral   SpO2: 100% 96% 94% 100%   Weight:   67.8 kg (149 lb 8 oz)        Current Medications:    Current Facility-Administered Medications:     sodium phosphate 15 mmol in 0.9% NaCl 100mL IVPB premix, 15 mmol, Intravenous, Once    traMADol (Ultram) tab 50 mg, 50 mg, Oral, Q6H PRN    docusate sodium (Colace) cap 100 mg, 100 mg, Oral, BID    polyethylene glycol (PEG 3350) (Miralax) 17 g oral packet 17 g, 17 g, Oral, Daily PRN    magnesium hydroxide (Milk of Magnesia) 400 MG/5ML oral suspension 30 mL, 30 mL, Oral, Daily PRN    bisacodyl (Dulcolax) 10 MG rectal suppository 10 mg, 10 mg, Rectal, Daily PRN    fleet enema (Fleet) 7-19 GM/118ML rectal enema 133 mL, 1 enema, Rectal, Once PRN    amitriptyline (Elavil) tab 25 mg, 25 mg, Oral, Nightly    carvedilol (Coreg) tab 25 mg, 25 mg, Oral, BID    sacubitril-valsartan (Entresto) 24-26 MG per tab 1 tablet, 1 tablet, Oral, BID    ferrous sulfate DR tab 325 mg, 325 mg, Oral, Daily with breakfast    folic acid (Folvite) tab 800 mcg, 800 mcg, Oral, Daily    HYDROcodone-acetaminophen (Norco)  MG per tab 1 tablet, 1 tablet, Oral, Q6H PRN    pantoprazole (Protonix) DR tab 40 mg, 40 mg, Oral, BID AC    bumetanide (Bumex) 0.25 MG/ML injection 1 mg, 1 mg, Intravenous, BID (Diuretic)     alendronate (Fosamax) tab 70 mg, 70 mg, Oral, Weekly    Physical Examination:  General: Lethargic, arousable, oriented.   Chest: Clear to auscultation.  Heart: Regular rate and rhythm.   Abdomen: Soft, non tender  Extremities: Pitting edema bilateral LE edema.  Neurological: Grossly intact.     Labs:  Recent Labs   Lab 02/05/24  0635 02/06/24  0742 02/07/24  0712   RBC 2.95* 2.80* 2.75*   HGB 9.3* 8.6* 8.6*   HCT 27.6* 26.6* 25.9*   MCV 93.6 95.0 94.2   MCH 31.5 30.7 31.3   MCHC 33.7 32.3 33.2   RDW 19.4* 19.7* 19.5*   NEPRELIM 2.17 1.21* 1.73   WBC 3.0* 2.0* 2.5*   PLT 63.0* 44.0* 32.0*     Recent Labs   Lab 02/05/24  0635 02/06/24  0742 02/07/24  0712   GLU 88 82 88   BUN 17 24* 15   CREATSERUM 1.27* 1.25* 1.06*   EGFRCR 44* 44* 54*   CA 8.4* 8.5* 8.2*    139 137   K 4.4 4.5 4.1    106 105   CO2 24.0 22.0 24.0      No results for input(s): \"PT\", \"INR\", \"PTT\" in the last 168 hours.     Radiology:  No results found.     Impression and Plan:  78 year old AAF with acute on chronic CHF, chronic anemia, hematology consulted for worsening pancytopenia similar to recent admission:     Acute on chronic anemia   - chronic multifactorial anemia; anemia of chronic disease, renal failure, drug induced (methotrexate), recent GI blood loss in the setting of anticoagulation  - recent EGD/CLN 1/17 small transverse colon AVM s/p APC likely was the source of bleeding.   - prior BMBx was negative per patient- done in [place] few years ago. no records available.   - recent work up showed no evidence of hemolysis or nutritional deficiency.   - currently no sings or symptoms of active bleeding. Hgb relatively stable.   - sTR and SPEP pending for complete work up.   - consider watchman's given h/o GIB with anticoagulation.   - monitor CBC and transfuse as needed to keep hgb => 8 due to cardiac disease. No indication for transfusion today.      Thrombocytopenia  - severe acute thrombocytopenia, with normal count at  baseline  - probably consumptive in the setting of acute illness and heart failure, similar to recent admission, platelet were improved and back to normal at discharge.   - peripheral smear with no platelet clumps or schistocytes. No heparin exposure. No new medication.   - DOAC on hold, continue to monitor and transfuse if Plt <20 or <50 with bleeding.      Leukopenia  - mainly lymphopenia, likely drug induced vs autoimmune. Stable.   - may need repeat BMBx if cytopenias persist after her acute issues resolve      Rheumatoid arthritis   - on methotrexate > 20 yrs and humira added 2021     Acute on chronic CHF:  - management per cardiology, on bumex, entresto, coreg      Will continue to follow      Briana Snyder MD   [facility]

## 2024-02-07 NOTE — PLAN OF CARE
Problem: Patient Centered Care  Goal: Patient preferences are identified and integrated in the patient's plan of care  Description: Interventions:  - What would you like us to know as we care for you? I live with my  and two grandchildren  - Provide timely, complete, and accurate information to patient/family  - Incorporate patient and family knowledge, values, beliefs, and cultural backgrounds into the planning and delivery of care  - Encourage patient/family to participate in care and decision-making at the level they choose  - Honor patient and family perspectives and choices  Outcome: Progressing     Problem: Patient/Family Goals  Goal: Patient/Family Long Term Goal  Description: Patient's Long Term Goal: To get better    Interventions:  - IV diuretics, prn O2 supplementation, cardiac monitoring and electrolyte protocol  - See additional Care Plan goals for specific interventions  Outcome: Progressing  Goal: Patient/Family Short Term Goal  Description: Patient's Short Term Goal: To breathe better    Interventions:   - IV diuretics, O2 supplementation prn, cardiac monitoring, electrolyte protocol  - See additional Care Plan goals for specific interventions  Outcome: Progressing     Problem: CARDIOVASCULAR - ADULT  Goal: Maintains optimal cardiac output and hemodynamic stability  Description: INTERVENTIONS:  - Monitor vital signs, rhythm, and trends  - Monitor for bleeding, hypotension and signs of decreased cardiac output  - Evaluate effectiveness of vasoactive medications to optimize hemodynamic stability  - Monitor arterial and/or venous puncture sites for bleeding and/or hematoma  - Assess quality of pulses, skin color and temperature  - Assess for signs of decreased coronary artery perfusion - ex. Angina  - Evaluate fluid balance, assess for edema, trend weights  Outcome: Progressing  Goal: Absence of cardiac arrhythmias or at baseline  Description: INTERVENTIONS:  - Continuous cardiac monitoring,  monitor vital signs, obtain 12 lead EKG if indicated  - Evaluate effectiveness of antiarrhythmic and heart rate control medications as ordered  - Initiate emergency measures for life threatening arrhythmias  - Monitor electrolytes and administer replacement therapy as ordered  Outcome: Progressing     Problem: GENITOURINARY - ADULT  Goal: Absence of urinary retention  Description: INTERVENTIONS:  - Assess patient’s ability to void and empty bladder  - Monitor intake/output and perform bladder scan as needed  - Follow urinary retention protocol/standard of care  - Consider collaborating with pharmacy to review patient's medication profile  - Implement strategies to promote bladder emptying  Outcome: Progressing     Problem: RESPIRATORY - ADULT  Goal: Achieves optimal ventilation and oxygenation  Description: INTERVENTIONS:  - Assess for changes in respiratory status  - Assess for changes in mentation and behavior  - Position to facilitate oxygenation and minimize respiratory effort  - Oxygen supplementation based on oxygen saturation or ABGs  - Provide Smoking Cessation handout, if applicable  - Encourage broncho-pulmonary hygiene including cough, deep breathe, Incentive Spirometry  - Assess the need for suctioning and perform as needed  - Assess and instruct to report SOB or any respiratory difficulty  - Respiratory Therapy support as indicated  - Manage/alleviate anxiety  - Monitor for signs/symptoms of CO2 retention  Outcome: Progressing     Problem: Impaired Functional Mobility  Goal: Achieve highest/safest level of mobility/gait  Description: Interventions:  - Assess patient's functional ability and stability  - Promote increasing activity/tolerance for mobility and gait  - Educate and engage patient/family in tolerated activity level and precautions  - Recommend use of chair position in bed 3 times per day  Outcome: Progressing      PRN Norco and patient can also have Tramadol for pain.  Alert and oriented x4.   Purewick in place.  Bed alarm on, call light within reach, bed locked and in lowest position.

## 2024-02-07 NOTE — TELEPHONE ENCOUNTER
Daughter calling about appt request phone calls from 1/25 - states pt is in [facility] now if  wants to go check on her

## 2024-02-07 NOTE — PROGRESS NOTES
Progress Note  Margaretbasil Silverio Patient Status:  Inpatient    3/14/1945 MRN [id number]   Location [facility] 3W/SW Attending Rosalinda Barry DO   Hosp Day # 4 PCP Johnathon Rodriguez DO     Subjective:  Pt denies any complaints, occasional SOB    Objective:  /65 (BP Location: Right arm)   Pulse 60   Temp 97.4 °F (36.3 °C) (Oral)   Resp 18   Wt 149 lb 8 oz (67.8 kg)   SpO2 100%   BMI 24.88 kg/m²     Telemetry: AV paced      Intake/Output:    Intake/Output Summary (Last 24 hours) at 2024 0848  Last data filed at 2024 0600  Gross per 24 hour   Intake 470 ml   Output 1150 ml   Net -680 ml       Last 3 Weights   24 0457 149 lb 8 oz (67.8 kg)   24 1108 150 lb 14.4 oz (68.4 kg)   24 0400 149 lb 6.4 oz (67.8 kg)   24 0635 148 lb 14.4 oz (67.5 kg)   24 0453 149 lb 9.6 oz (67.9 kg)   24 0032 149 lb 14.4 oz (68 kg)   24 135 lb (61.2 kg)   24 0536 153 lb 11.2 oz (69.7 kg)   24 0441 151 lb 8 oz (68.7 kg)   24 0535 146 lb (66.2 kg)   24 1312 146 lb (66.2 kg)   24 0432 146 lb (66.2 kg)   24 0430 151 lb (68.5 kg)   01/15/24 0536 153 lb (69.4 kg)   24 0119 150 lb 5.7 oz (68.2 kg)   24 2034 140 lb (63.5 kg)   23 1412 145 lb (65.8 kg)       Labs:  Recent Labs   Lab 24  0635 24  0742 24  0712   GLU 88 82 88   BUN 17 24* 15   CREATSERUM 1.27* 1.25* 1.06*   EGFRCR 44* 44* 54*   CA 8.4* 8.5* 8.2*    139 137   K 4.4 4.5 4.1    106 105   CO2 24.0 22.0 24.0     Recent Labs   Lab 24  0635 24  0742 24  0712   RBC 2.95* 2.80* 2.75*   HGB 9.3* 8.6* 8.6*   HCT 27.6* 26.6* 25.9*   MCV 93.6 95.0 94.2   MCH 31.5 30.7 31.3   MCHC 33.7 32.3 33.2   RDW 19.4* 19.7* 19.5*   NEPRELIM 2.17 1.21* 1.73   WBC 3.0* 2.0* 2.5*   PLT 63.0* 44.0* 32.0*         Recent Labs   Lab 24  2112   TROPHS 34     Lab Results   Component Value Date    INR 1.60 (H) 01/15/2024    INR 2.60  (H) 2024    INR 3.63 (H) 2024       Diagnostics:       Review of Systems   Respiratory: Negative.     Cardiovascular: Negative.        Physical Exam:    Physical Exam  Vitals reviewed.   Constitutional:       General: She is not in acute distress.  Neck:      Vascular: No JVD.   Cardiovascular:      Rate and Rhythm: Normal rate and regular rhythm.      Pulses:           Dorsalis pedis pulses are 2+ on the right side and 2+ on the left side.      Heart sounds: S1 normal and S2 normal. Murmur heard.      Systolic murmur is present with a grade of 2/6.   Pulmonary:      Effort: Pulmonary effort is normal.      Breath sounds: No wheezing or rhonchi.      Comments: Crackles bilaterally on asculatation  Musculoskeletal:      Cervical back: Normal range of motion and neck supple.      Right lower le+ Edema present.      Left lower le+ Edema present.   Skin:     General: Skin is warm and dry.   Neurological:      Mental Status: She is alert and oriented to person, place, and time.   Psychiatric:         Mood and Affect: Mood normal.         Medications:   docusate sodium  100 mg Oral BID    amitriptyline  25 mg Oral Nightly    carvedilol  25 mg Oral BID    sacubitril-valsartan  1 tablet Oral BID    ferrous sulfate  325 mg Oral Daily with breakfast    folic acid  800 mcg Oral Daily    pantoprazole  40 mg Oral BID AC    bumetanide  1 mg Intravenous BID (Diuretic)    alendronate  70 mg Oral Weekly         Assessment/Plan:    Acute hypoxic respiratory failure likely due to volume overload, CHF  - on RA     Acute on chronic HFrEF  - echo 24 LVEF 35-40% mitral valve bioprostesis present, Sev TR, PASP 43mmHg  - on bumex 1mg IV BID  - net output of 1150ml over past 24hr  - wt down 1lb up yesterday, checked on bed scale, accuracy questionable  - on coregdineshsto     NICM s/p ICD  Abott  -Persistent low EF <35% despite 3 months GDMT (echo from 23 with LVEF 37%)   - on coreg, Entresto bumex  -  weight 1 lb down from yesterday  - interrogation of device: 98% of RV pacing.      PAF/Aflutter   - rate controlled  - on BB  - DOAC on hold due to GIB  - may need OP evaluation for possible watchman procedure     SSS  S/p DC-ICD 9/23     Bilateral lower extremity edema     CKD3  -creatinine 1.06 today  - improving with diuresis      Recent GIB due to AVM's  - s/p EGD/Colonoscopy 1/17 with small transverse colon AVM   - DOAC on hold for GIB     Chronic anemia  -hgb 8.6     HTN: well controlled      Plan:  - pt still has BLE and bilateral lower lobe crackles, on room air. Will continue with IV Bumex one more day. Switch to PO tomorrow. Creatinine improved.   - closely monitor creatinine. 1.15l out over 24hrs  - continue with strict monitoring of intake and output  -DOAC on hold due to GIB. Would benefit from Watchman procedure given recent GIB, will reintroduce in OP settings, previously declined.   - interrogated device showed 96% of RV pacing, consider AV delay or upgrade to CRT-D at some point per Dr Celestin. Plan for EP follow up as outpatient.     KODI Mclain  [facility]  2/7/2024  8:48 AM  Addendum:  Patient seen and examined  Agree with the assessment and EP evaluation as an outpatient  L3     Smooth Swain MD

## 2024-02-07 NOTE — PAYOR COMM NOTE
--------------  CONTINUED STAY REVIEW    Payor: [employer]  Subscriber #:  [id number]  Authorization Number: [id number]    Admit date: 2/3/24  Admit time: 12:22 AM    Admitting Physician: Danielle Barber MD  Attending Physician:  Danielle Barber MD  Primary Care Physician: Johnathon Rodriguez DO    REVIEW DOCUMENTATION:    24    CARDIOLOGY      Physical Exam  Vitals reviewed.   Constitutional:       General: She is not in acute distress.  Neck:      Vascular: No JVD.   Cardiovascular:      Rate and Rhythm: Normal rate and regular rhythm.      Pulses:           Dorsalis pedis pulses are 2+ on the right side and 2+ on the left side.      Heart sounds: S1 normal and S2 normal. Murmur heard.      Systolic murmur is present with a grade of 2/6.   Pulmonary:      Effort: Pulmonary effort is normal.      Breath sounds: No wheezing or rhonchi.      Comments: Crackles bilaterally on asculatation  Musculoskeletal:      Cervical back: Normal range of motion and neck supple.      Right lower le+ Edema present.      Left lower le+ Edema present.   Skin:     General: Skin is warm and dry.   Neurological:      Mental Status: She is alert and oriented to person, place, and time.   Psychiatric:         Mood and Affect: Mood normal.      ssessment/Plan:     Acute hypoxic respiratory failure likely due to volume overload, CHF  - on RA     Acute on chronic HFrEF  - echo 24 LVEF 35-40% mitral valve bioprostesis present, Sev TR, PASP 43mmHg  - on bumex 1mg IV BID  - net output of 600ml over past 24hr  - wt down 1lb up yesterday, checked on bed scale, accuracy questionable  - on coreg, entresto     NICM s/p ICD  Abott  -Persistent low EF <35% despite 3 months GDMT (echo from 23 with LVEF 37%)   - on coreg, Entresto, bumex  - weight 1 lb up from yesterday  - interrogation of device ordered to assess for RV pacing percentage and possible consideration of CRT-D in future     PAF/Aflutter   -  rate controlled  - on BB  - DOAC on hold due to GIB  - may need OP evaluation for possible watchman procedure     SSS  S/p DC-ICD 9/23     Bilateral lower extremity edema     CKD3  -creatinine 1.27 today  - improving with diuresis      Recent GIB due to AVM's  - s/p EGD/Colonoscopy 1/17 with small transverse colon AVM   - DOAC on hold for GIB     Chronic anemia  -hgb 9.3     HTN: well controlled        Plan:  - pt still has BLE and bilateral lower lobe crackles, on room air. Will continue with IV Bumex, awaiting BMP result from today. May need to give extra dose of bumex today depending on kidney function.  - closely monitor creatinine   - continue with strict monitoring of intake and output  -prefer standing weight.  -DOAC on hold due to GIB. Would benefit from Watchman procedure given recent GIB, will reintroduce in OP settings, previously declined.   - interrogate device to check to see the percentage of RV pacing.        HEME CONSULT      Impression and Plan:       78 year old AAF with acute on chronic CHF, chronic anemia, hematology consulted for worsening pancytopenia similar to recent admission:     Acute on chronic anemia   - chronic anemia multifactorial; anemia of chronic disease, renal failure, drug induced (methotrexate), recent GI blood loss in the setting of anticoagulation  - s/p EGD/CLN 1/17 small transverse colon AVM s/p APC likely was the source of bleeding.   - prior BMBx was negative per patient- done in [place] few years ago. no records available.   - recent work up showed no evidence of hemolysis, or nutritional deficiency.   - currently no sings or symptoms of active bleeding.   - will check reticulocyte counts, sTR and SPEP for complete work up.   - consider watchman's given h/o GIB with anticoagulation.   - monitor CBC and transfuse as needed to keep hgb => 8 due to cardiac disease     Thrombocytopenia  - severe acute thrombocytopenia, with normal count at baseline  - probably consumptive  in the setting of acute illness and heart failure, similar to recent admission.   - peripheral smear with no platelet clumps or schistocytes.  - DOAC on hold, continue to monitor and transfuse if Plt <20 or <50 with bleeding.      Leukopenia  - mainly lymphopenia, likely drug induced vs autoimmune   - may need repeat BMBx if cytopenias persist after her acute issues resolve      Rheumatoid arthritis   - on methotrexate > 20 yrs and humira added 2021                Component  Ref Range & Units 2/6/24 0742   Magnesium  1.6 - 2.6 mg/dL 1.5 Low            Component  Ref Range & Units 2/6/24 0742   Glucose  70 - 99 mg/dL 82   Sodium  136 - 145 mmol/L 139   Potassium  3.5 - 5.1 mmol/L 4.5   Chloride  98 - 112 mmol/L 106   CO2  21.0 - 32.0 mmol/L 22.0   Anion Gap  0 - 18 mmol/L 11   BUN  9 - 23 mg/dL 24 High    Creatinine  0.55 - 1.02 mg/dL 1.25 High    BUN/CREA Ratio  10.0 - 20.0 19.2   Calcium, Total  8.7 - 10.4 mg/dL 8.5 Low    Calculated Osmolality  275 - 295 mOsm/kg 291   eGFR-Cr  >=60 mL/min/1.73m2 44 Low      Component  Ref Range & Units 2/6/24 0742   WBC  4.0 - 11.0 x10(3) uL 2.0 Low    RBC  3.80 - 5.30 x10(6)uL 2.80 Low    HGB  12.0 - 16.0 g/dL 8.6 Low    HCT  35.0 - 48.0 % 26.6 Low    MCV  80.0 - 100.0 fL 95.0   MCH  26.0 - 34.0 pg 30.7   MCHC  31.0 - 37.0 g/dL 32.3   RDW-SD  35.1 - 46.3 fL 61.5 High    RDW  11.0 - 15.0 % 19.7 High    PLT  150.0 - 450.0 10(3)uL 44.0 Low      Component  Ref Range & Units 2/6/24 0742   Retic%  0.5 - 2.5 % 0.2 Low    Retic Absolute  22.5 - 147.5 x10(3) uL 5.5 Low    Retic IRF  0.100 - 0.300 Ratio 0.089 Low    Reticulocyte Hemoglobin Equivalent  28.2 - 36.6 pg 38.3 High               MEDICATIONS ADMINISTERED IN LAST 1 DAY:  amitriptyline (Elavil) tab 25 mg       Date Action Dose Route User    2/6/2024 2137 Given 25 mg Oral Breanna Diamond, RN          bumetanide (Bumex) 0.25 MG/ML injection 1 mg       Date Action Dose Route User    2/6/2024 1800 Given 1 mg Intravenous Thalia  JEFFERY Bedolla    2/6/2024 1042 Given 1 mg Intravenous Chioma Vásquez RN          bumetanide (Bumex) 0.25 MG/ML injection 1 mg       Date Action Dose Route User    2/6/2024 1524 Given 1 mg Intravenous Sheryl Norwood          carvedilol (Coreg) tab 25 mg       Date Action Dose Route User    2/6/2024 2137 Given 25 mg Oral Breanna Diamond RN    2/6/2024 1041 Given 25 mg Oral Chioma Vásquez RN          docusate sodium (Colace) cap 100 mg       Date Action Dose Route User    2/7/2024 0506 Given 100 mg Oral Breanna Diamond RN          ferrous sulfate DR tab 325 mg       Date Action Dose Route User    2/6/2024 1042 Given 325 mg Oral Chioma Vásquez RN          folic acid (Folvite) tab 800 mcg       Date Action Dose Route User    2/6/2024 1042 Given 800 mcg Oral Chioma Vásquez RN          HYDROcodone-acetaminophen (Norco)  MG per tab 1 tablet       Date Action Dose Route User    2/7/2024 0506 Given 1 tablet Oral Breanna Diamond RN    2/6/2024 2140 Given 1 tablet Oral Breanna Diamond RN    2/6/2024 1418 Given 1 tablet Oral Chioma Vásquez RN          magnesium sulfate 4 g/100mL IVPB premix 4 g       Date Action Dose Route User    2/6/2024 1118 New Bag 4 g Intravenous Chioma Vásquez RN          pantoprazole (Protonix) DR tab 40 mg       Date Action Dose Route User    2/7/2024 0506 Given 40 mg Oral Breanna Diamond RN    2/6/2024 1800 Given 40 mg Oral Chioma Vásquez RN          sacubitril-valsartan (Entresto) 24-26 MG per tab 1 tablet       Date Action Dose Route User    2/6/2024 2137 Given 1 tablet Oral Breanna Diamond RN    2/6/2024 1119 Given 1 tablet Oral Chioma Vásquez RN          sodium phosphate 15 mmol in 0.9% NaCl 100mL IVPB premix       Date Action Dose Route User    2/6/2024 1524 Given 15 mmol Intravenous NatSheryl leroy            Vitals (last day)       Date/Time Temp Pulse Resp BP SpO2 Weight O2 Device O2 Flow Rate (L/min) [facility]    02/07/24 0457 97.2 °F (36.2 °C) 60 18 124/66 94 % -- None  (Room air) --     02/07/24 0457 -- -- -- -- -- 149 lb 8 oz -- -- RA    02/06/24 2135 97.7 °F (36.5 °C) 60 17 119/68 96 % -- None (Room air) --     02/06/24 1759 -- -- 16 120/84 100 % -- None (Room air) --     02/06/24 1417 98 °F (36.7 °C) 60 16 124/69 98 % -- None (Room air) --     02/06/24 1122 -- 60 -- -- -- -- -- -- TL    02/06/24 1108 -- -- -- -- -- 150 lb 14.4 oz -- --     02/06/24 1038 97.5 °F (36.4 °C) 61 16 131/75 99 % -- None (Room air) --     02/06/24 0511 97.5 °F (36.4 °C) 60 16 117/77 97 % -- None (Room air) --     02/06/24 0400 -- -- -- -- -- 149 lb 6.4 oz -- -- TB

## 2024-02-08 ENCOUNTER — APPOINTMENT (OUTPATIENT)
Dept: ULTRASOUND IMAGING | Facility: HOSPITAL | Age: 79
End: 2024-02-08
Attending: HOSPITALIST
Payer: MEDICARE

## 2024-02-08 PROBLEM — I82.622 ACUTE DEEP VEIN THROMBOSIS (DVT) OF LEFT UPPER EXTREMITY (HCC): Status: ACTIVE | Noted: 2024-02-08

## 2024-02-08 PROBLEM — Z71.89 GOALS OF CARE, COUNSELING/DISCUSSION: Status: ACTIVE | Noted: 2024-01-01

## 2024-02-08 PROBLEM — Z71.89 ADVANCE CARE PLANNING: Status: ACTIVE | Noted: 2024-01-01

## 2024-02-08 PROBLEM — Z71.89 GOALS OF CARE, COUNSELING/DISCUSSION: Status: ACTIVE | Noted: 2024-02-08

## 2024-02-08 PROBLEM — Z51.5 PALLIATIVE CARE BY SPECIALIST: Status: ACTIVE | Noted: 2024-02-08

## 2024-02-08 PROBLEM — I82.622 ACUTE DEEP VEIN THROMBOSIS (DVT) OF LEFT UPPER EXTREMITY (HCC): Status: ACTIVE | Noted: 2024-01-01

## 2024-02-08 PROBLEM — Z51.5 PALLIATIVE CARE BY SPECIALIST: Status: ACTIVE | Noted: 2024-01-01

## 2024-02-08 PROBLEM — Z71.89 ADVANCE CARE PLANNING: Status: ACTIVE | Noted: 2024-02-08

## 2024-02-08 LAB
ANION GAP SERPL CALC-SCNC: 8 MMOL/L (ref 0–18)
ANTIBODY SCREEN: POSITIVE
BASOPHILS # BLD AUTO: 0.03 X10(3) UL (ref 0–0.2)
BASOPHILS NFR BLD AUTO: 0.9 %
BUN BLD-MCNC: 16 MG/DL (ref 9–23)
BUN/CREAT SERPL: 14.4 (ref 10–20)
CALCIUM BLD-MCNC: 8.5 MG/DL (ref 8.7–10.4)
CHLORIDE SERPL-SCNC: 105 MMOL/L (ref 98–112)
CO2 SERPL-SCNC: 25 MMOL/L (ref 21–32)
CREAT BLD-MCNC: 1.11 MG/DL
DAT (C3D): NEGATIVE
DAT (IGG): POSITIVE
DEPRECATED RDW RBC AUTO: 57.6 FL (ref 35.1–46.3)
DEPRECATED RDW RBC AUTO: 58.6 FL (ref 35.1–46.3)
DIRECT COOMBS POLY: POSITIVE
EGFRCR SERPLBLD CKD-EPI 2021: 51 ML/MIN/1.73M2 (ref 60–?)
EOSINOPHIL # BLD AUTO: 0.22 X10(3) UL (ref 0–0.7)
EOSINOPHIL NFR BLD AUTO: 6.7 %
ERYTHROCYTE [DISTWIDTH] IN BLOOD BY AUTOMATED COUNT: 18.7 % (ref 11–15)
ERYTHROCYTE [DISTWIDTH] IN BLOOD BY AUTOMATED COUNT: 18.9 % (ref 11–15)
GLUCOSE BLD-MCNC: 87 MG/DL (ref 70–99)
HCT VFR BLD AUTO: 25.6 %
HCT VFR BLD AUTO: 25.8 %
HGB BLD-MCNC: 8.4 G/DL
HGB BLD-MCNC: 8.5 G/DL
IMM GRANULOCYTES # BLD AUTO: 0.01 X10(3) UL (ref 0–1)
IMM GRANULOCYTES NFR BLD: 0.3 %
KAPPA LC FREE SER-MCNC: 7.99 MG/DL (ref 0.33–1.94)
KAPPA LC FREE/LAMBDA FREE SER NEPH: 1.6 {RATIO} (ref 0.26–1.65)
LAMBDA LC FREE SERPL-MCNC: 5.01 MG/DL (ref 0.57–2.63)
LYMPHOCYTES # BLD AUTO: 0.79 X10(3) UL (ref 1–4)
LYMPHOCYTES NFR BLD AUTO: 24.2 %
Lab: NEGATIVE
Lab: POSITIVE
MCH RBC QN AUTO: 29.8 PG (ref 26–34)
MCH RBC QN AUTO: 30.6 PG (ref 26–34)
MCHC RBC AUTO-ENTMCNC: 32.8 G/DL (ref 31–37)
MCHC RBC AUTO-ENTMCNC: 32.9 G/DL (ref 31–37)
MCV RBC AUTO: 90.8 FL
MCV RBC AUTO: 92.8 FL
MONOCYTES # BLD AUTO: 0.09 X10(3) UL (ref 0.1–1)
MONOCYTES NFR BLD AUTO: 2.8 %
NEUTROPHILS # BLD AUTO: 2.12 X10 (3) UL (ref 1.5–7.7)
NEUTROPHILS # BLD AUTO: 2.12 X10(3) UL (ref 1.5–7.7)
NEUTROPHILS NFR BLD AUTO: 65.1 %
OSMOLALITY SERPL CALC.SUM OF ELEC: 287 MOSM/KG (ref 275–295)
PHOSPHATE SERPL-MCNC: 2.5 MG/DL (ref 2.4–5.1)
PLATELET # BLD AUTO: 18 10(3)UL (ref 150–450)
PLATELET # BLD AUTO: 28 10(3)UL (ref 150–450)
POTASSIUM SERPL-SCNC: 3.8 MMOL/L (ref 3.5–5.1)
PST POLY: POSITIVE
RBC # BLD AUTO: 2.78 X10(6)UL
RBC # BLD AUTO: 2.82 X10(6)UL
RH BLOOD TYPE: POSITIVE
RH BLOOD TYPE: POSITIVE
SODIUM SERPL-SCNC: 138 MMOL/L (ref 136–145)
WBC # BLD AUTO: 3.3 X10(3) UL (ref 4–11)
WBC # BLD AUTO: 3.7 X10(3) UL (ref 4–11)

## 2024-02-08 PROCEDURE — 99233 SBSQ HOSP IP/OBS HIGH 50: CPT | Performed by: HOSPITALIST

## 2024-02-08 PROCEDURE — 99233 SBSQ HOSP IP/OBS HIGH 50: CPT | Performed by: INTERNAL MEDICINE

## 2024-02-08 PROCEDURE — 93971 EXTREMITY STUDY: CPT | Performed by: HOSPITALIST

## 2024-02-08 PROCEDURE — 99222 1ST HOSP IP/OBS MODERATE 55: CPT | Performed by: REGISTERED NURSE

## 2024-02-08 PROCEDURE — 30233R1 TRANSFUSION OF NONAUTOLOGOUS PLATELETS INTO PERIPHERAL VEIN, PERCUTANEOUS APPROACH: ICD-10-PCS | Performed by: INTERNAL MEDICINE

## 2024-02-08 RX ORDER — MAGNESIUM OXIDE 400 MG (241.3 MG MAGNESIUM) TABLET
800 TABLET DAILY
Qty: 90 TABLET | Refills: 0 | Status: SHIPPED | OUTPATIENT
Start: 2024-02-08

## 2024-02-08 RX ORDER — FUROSEMIDE 40 MG/1
40 TABLET ORAL DAILY
Status: DISCONTINUED | OUTPATIENT
Start: 2024-02-08 | End: 2024-02-12

## 2024-02-08 RX ORDER — SODIUM CHLORIDE 9 MG/ML
INJECTION, SOLUTION INTRAVENOUS ONCE
Status: DISCONTINUED | OUTPATIENT
Start: 2024-02-08 | End: 2024-02-09

## 2024-02-08 RX ORDER — SENNA AND DOCUSATE SODIUM 50; 8.6 MG/1; MG/1
2 TABLET, FILM COATED ORAL NIGHTLY
Status: DISCONTINUED | OUTPATIENT
Start: 2024-02-08 | End: 2024-02-09

## 2024-02-08 NOTE — PROGRESS NOTES
Progress Note  Margaret Silverio Patient Status:  Inpatient    3/14/1945 MRN [id number]   Location [facility] 3W/SW Attending Rosalinda Barry DO   Hosp Day # 5 PCP Johnathon Rodriguez DO     Subjective:  No acute events overnight.  Resting in bed this morning on room air, denies any CP, SOB, palpitations or dizziness at this time. Verbalizes feeling much better.      Objective:  /87 (BP Location: Left arm)   Pulse 60   Temp 97.3 °F (36.3 °C) (Oral)   Resp 18   Wt 149 lb (67.6 kg)   SpO2 99%   BMI 24.79 kg/m²     Telemetry: A-V paced, HR 60s       Intake/Output:    Intake/Output Summary (Last 24 hours) at 2024 1131  Last data filed at 2024 0025  Gross per 24 hour   Intake 240 ml   Output 600 ml   Net -360 ml       Last 3 Weights   24 0434 149 lb (67.6 kg)   24 0457 149 lb 8 oz (67.8 kg)   24 1108 150 lb 14.4 oz (68.4 kg)   24 0400 149 lb 6.4 oz (67.8 kg)   24 0635 148 lb 14.4 oz (67.5 kg)   24 0453 149 lb 9.6 oz (67.9 kg)   24 0032 149 lb 14.4 oz (68 kg)   24 135 lb (61.2 kg)   24 0536 153 lb 11.2 oz (69.7 kg)   24 0441 151 lb 8 oz (68.7 kg)   24 0535 146 lb (66.2 kg)   24 1312 146 lb (66.2 kg)   24 0432 146 lb (66.2 kg)   24 0430 151 lb (68.5 kg)   01/15/24 0536 153 lb (69.4 kg)   24 0119 150 lb 5.7 oz (68.2 kg)   24 140 lb (63.5 kg)   23 1412 145 lb (65.8 kg)       Labs:  Recent Labs   Lab 24  0742 24  0724  07   GLU 82 88 87   BUN 24* 15 16   CREATSERUM 1.25* 1.06* 1.11*   EGFRCR 44* 54* 51*   CA 8.5* 8.2* 8.5*    137 138   K 4.5 4.1 3.8    105 105   CO2 22.0 24.0 25.0     Recent Labs   Lab 24   RBC 2.80* 2.75* 2.78*   HGB 8.6* 8.6* 8.5*   HCT 26.6* 25.9* 25.8*   MCV 95.0 94.2 92.8   MCH 30.7 31.3 30.6   MCHC 32.3 33.2 32.9   RDW 19.7* 19.5* 18.9*   NEPRELIM 1.21* 1.73 2.12   WBC 2.0* 2.5*  3.3*   PLT 44.0* 32.0* 28.0*         Recent Labs   Lab 02/02/24  2112   TROPHS 34       Review of Systems   Constitutional: Negative.   Cardiovascular: Negative.    Respiratory: Negative.     Musculoskeletal:  Positive for joint swelling.        Left upper arm edema        Physical Exam:    Gen: alert, oriented x 3, NAD  Heent: pupils equal, reactive. Mucous membranes moist.   Neck: no jvd  Cardiac: regular rate and rhythm, normal S1,S2, 2/6 systolic murmur, no gallop or rub   Lungs: CTA, diminished at the basis  Abd: soft, NT/ND +bs  Ext: left upper extremity edema   Skin: Warm, dry  Neuro: No focal deficits      Medications:     potassium phosphate dibasic 15 mmol in sodium chloride 0.9% 250 mL IVPB  15 mmol Intravenous Once    docusate sodium  100 mg Oral BID    amitriptyline  25 mg Oral Nightly    carvedilol  25 mg Oral BID    sacubitril-valsartan  1 tablet Oral BID    ferrous sulfate  325 mg Oral Daily with breakfast    folic acid  800 mcg Oral Daily    pantoprazole  40 mg Oral BID AC    bumetanide  1 mg Intravenous BID (Diuretic)    alendronate  70 mg Oral Weekly       Assessment:  Acute hypoxic respiratory failure likley d/t volume overload, CHF  Currently on room air   Acute on chronic HFrEF  Echo 1/14/24 LVEF 35-40%, mitral valve bioprosthesis is present, sev TR, PASP 43 mmHg   Diuresis with IV Bumex 1 mg bid, net fluid off ~1L, weight not changed?accuracy   On carvedilol, Entresto,   NICM s/p ICD 09/2023 [employer]  Device interrogation showed 96% RV pacing  Would consider  AV delay or upgrading to CRT-D as outline by Dr Celestin-will follow up with EP  OP.     Severe tricuspid regurgitation  PAF/A-flutter, rates controlled    On BB  DOAC being held d/t GIB  Would consider OP Watchman procedure eval.  CKD3, crt 1.11-stable    Lower extremities edema  Recent GIB d/t AVMs, holding off DOAC  Chronic anemia, hgb 8.5-stable    HTN-controlled   Rheumatoid arthritis      Plan:  Appears compensated cardiac status,  breathing improved significantly-will switch to PO diuretic.  Continue carvedilol, entresto.  Continue holding DOAC with recent GIB, would consider OP Watchman evaluation with EP.  High percentage of RV pacing 96% by device interrogation, would consider  AV delay or upgrading to CRT-D as outline by Dr Celestin-will follow up with EP  OP.   Left upper arm edema, not improving, would recommend ultrasound to r/o DVT-per primary   Tentative plan to dc to rehab today, ok with cardiology, will follow up with Dr Sargent OP in 1-2 weeks.     Plan of care discussed with patient, RN.    Kelsi Caal, APRN  2/8/2024  11:31 AM  [phone]      Addendum:  Patient sleeping in the bed.  Comfortable on RA  Agree with the assessment and the management plan.  L3    Smooth Swain MD

## 2024-02-08 NOTE — TELEPHONE ENCOUNTER
Please review; protocol failed/No Protocol    Outside labs   Collected 1/14/2024  2:23 PM          Component  Ref Range & Units 1/14/24  2:23 PM   Folate (Folic Acid)  >5.4 ng/mL 16.5   Comment: This test may exhibit interference whe        Requested Prescriptions   Pending Prescriptions Disp Refills    FOLIC ACID 800 MCG Oral Tab [Pharmacy Med Name: FOLIC ACID 800MCG TABLETS] 90 tablet 1     Sig: TAKE 1 TABLET(800 MCG) BY MOUTH DAILY       There is no refill protocol information for this order          Recent Outpatient Visits              1 month ago Arthropathy of cervical facet joint    [facility] [place]    Office Visit    5 months ago Cervical radiculopathy    [facility], [place]    Office Visit    5 months ago Medicare annual wellness visit, initial    [facility], [facility] Johnathon Rodriguez, DO    Office Visit    6 months ago Cervical radiculopathy    [facility] Felicitas Lee, DO    Office Visit    6 months ago Cervical radiculopathy    [facility], [place]    Office Visit

## 2024-02-08 NOTE — PLAN OF CARE
Patient is alert and oriented. Patient received Norco for left arm pain. Patient Phosporus was replaced IV today. Patient platelets were 28 thousand then 18 thousand today, Attending and Hematology were paged, 2 units of platelets were ordered. Blood bank states that the type and screen do not match due to patient creating an antibody. They are working to resolve it or MD can request it be released emergently. Hematology informed states that emergency release is not needed. Patient is on IV diuretic. Thrombus in the ICD lead detected in ultrasound. Cardiology aware, no new orders. Patient insurance authorization for Peggy Peace expires today. All safety measures are in place and call light is within reach.    Problem: Patient Centered Care  Goal: Patient preferences are identified and integrated in the patient's plan of care  Description: Interventions:  - What would you like us to know as we care for you? I live with my  and two grandchildren  - Provide timely, complete, and accurate information to patient/family  - Incorporate patient and family knowledge, values, beliefs, and cultural backgrounds into the planning and delivery of care  - Encourage patient/family to participate in care and decision-making at the level they choose  - Honor patient and family perspectives and choices  Outcome: Progressing     Problem: Patient/Family Goals  Goal: Patient/Family Long Term Goal  Description: Patient's Long Term Goal: To get better    Interventions:  - IV diuretics, prn O2 supplementation, cardiac monitoring and electrolyte protocol  - See additional Care Plan goals for specific interventions  Outcome: Progressing  Goal: Patient/Family Short Term Goal  Description: Patient's Short Term Goal: To breathe better    Interventions:   - IV diuretics, O2 supplementation prn, cardiac monitoring, electrolyte protocol  - See additional Care Plan goals for specific interventions  Outcome: Progressing     Problem:  CARDIOVASCULAR - ADULT  Goal: Maintains optimal cardiac output and hemodynamic stability  Description: INTERVENTIONS:  - Monitor vital signs, rhythm, and trends  - Monitor for bleeding, hypotension and signs of decreased cardiac output  - Evaluate effectiveness of vasoactive medications to optimize hemodynamic stability  - Monitor arterial and/or venous puncture sites for bleeding and/or hematoma  - Assess quality of pulses, skin color and temperature  - Assess for signs of decreased coronary artery perfusion - ex. Angina  - Evaluate fluid balance, assess for edema, trend weights  Outcome: Progressing  Goal: Absence of cardiac arrhythmias or at baseline  Description: INTERVENTIONS:  - Continuous cardiac monitoring, monitor vital signs, obtain 12 lead EKG if indicated  - Evaluate effectiveness of antiarrhythmic and heart rate control medications as ordered  - Initiate emergency measures for life threatening arrhythmias  - Monitor electrolytes and administer replacement therapy as ordered  Outcome: Progressing     Problem: RESPIRATORY - ADULT  Goal: Achieves optimal ventilation and oxygenation  Description: INTERVENTIONS:  - Assess for changes in respiratory status  - Assess for changes in mentation and behavior  - Position to facilitate oxygenation and minimize respiratory effort  - Oxygen supplementation based on oxygen saturation or ABGs  - Provide Smoking Cessation handout, if applicable  - Encourage broncho-pulmonary hygiene including cough, deep breathe, Incentive Spirometry  - Assess the need for suctioning and perform as needed  - Assess and instruct to report SOB or any respiratory difficulty  - Respiratory Therapy support as indicated  - Manage/alleviate anxiety  - Monitor for signs/symptoms of CO2 retention  Outcome: Progressing     Problem: GENITOURINARY - ADULT  Goal: Absence of urinary retention  Description: INTERVENTIONS:  - Assess patient’s ability to void and empty bladder  - Monitor intake/output  and perform bladder scan as needed  - Follow urinary retention protocol/standard of care  - Consider collaborating with pharmacy to review patient's medication profile  - Implement strategies to promote bladder emptying  Outcome: Progressing     Problem: Impaired Functional Mobility  Goal: Achieve highest/safest level of mobility/gait  Description: Interventions:  - Assess patient's functional ability and stability  - Promote increasing activity/tolerance for mobility and gait  - Educate and engage patient/family in tolerated activity level and precautions  - Recommend use of total lift for transfers  Outcome: Progressing

## 2024-02-08 NOTE — PROGRESS NOTES
[facility]    Progress Note    Margaret Silverio Patient Status:  Inpatient    3/14/1945 MRN [id number]   Location [facility] 3W/SW Attending Rosalinda Barry DO   Hosp Day # 4 PCP Johnathon Rodriguez DO     Chief complaint chf     Subjective:   Margaret Silverio is a(n) 78 year old female Pt doing well today. No c/os    ROS:   No cp, sob   No c/d   No n/v     Objective:   Blood pressure 125/63, pulse 60, temperature 97.6 °F (36.4 °C), temperature source Oral, resp. rate 18, weight 149 lb 8 oz (67.8 kg), SpO2 97%.      Intake/Output Summary (Last 24 hours) at 2024 181  Last data filed at 2024 1300  Gross per 24 hour   Intake 760 ml   Output 700 ml   Net 60 ml       Patient Weight(s) for the past 336 hrs:   Weight   24 0457 149 lb 8 oz (67.8 kg)   24 1108 150 lb 14.4 oz (68.4 kg)   24 0400 149 lb 6.4 oz (67.8 kg)   24 0635 148 lb 14.4 oz (67.5 kg)   24 0453 149 lb 9.6 oz (67.9 kg)   24 0032 149 lb 14.4 oz (68 kg)   24 2053 135 lb (61.2 kg)           General appearance: alert, appears stated age and cooperative  Pulmonary:  clear to auscultation bilaterally  Cardiovascular: S1, S2 normal, no murmur, click, rub or gallop, regular rate and rhythm  Abdominal: soft, non-tender; bowel sounds normal; no masses,  no organomegaly  Extremities: extremities normal, atraumatic, no cyanosis or edema        Medicines:     Current Facility-Administered Medications   Medication Dose Route Frequency    traMADol (Ultram) tab 50 mg  50 mg Oral Q6H PRN    docusate sodium (Colace) cap 100 mg  100 mg Oral BID    polyethylene glycol (PEG 3350) (Miralax) 17 g oral packet 17 g  17 g Oral Daily PRN    magnesium hydroxide (Milk of Magnesia) 400 MG/5ML oral suspension 30 mL  30 mL Oral Daily PRN    bisacodyl (Dulcolax) 10 MG rectal suppository 10 mg  10 mg Rectal Daily PRN    fleet enema (Fleet) 7-19 GM/118ML rectal enema 133 mL  1 enema Rectal Once PRN     amitriptyline (Elavil) tab 25 mg  25 mg Oral Nightly    carvedilol (Coreg) tab 25 mg  25 mg Oral BID    sacubitril-valsartan (Entresto) 24-26 MG per tab 1 tablet  1 tablet Oral BID    ferrous sulfate DR tab 325 mg  325 mg Oral Daily with breakfast    folic acid (Folvite) tab 800 mcg  800 mcg Oral Daily    HYDROcodone-acetaminophen (Norco)  MG per tab 1 tablet  1 tablet Oral Q6H PRN    pantoprazole (Protonix) DR tab 40 mg  40 mg Oral BID AC    bumetanide (Bumex) 0.25 MG/ML injection 1 mg  1 mg Intravenous BID (Diuretic)    alendronate (Fosamax) tab 70 mg  70 mg Oral Weekly       Lab Results   Component Value Date    WBC 2.5 (L) 02/07/2024    HGB 8.6 (L) 02/07/2024    HCT 25.9 (L) 02/07/2024    PLT 32.0 (L) 02/07/2024    CREATSERUM 1.06 (H) 02/07/2024    BUN 15 02/07/2024     02/07/2024    K 4.1 02/07/2024     02/07/2024    CO2 24.0 02/07/2024    GLU 88 02/07/2024    CA 8.2 (L) 02/07/2024    ALB 3.2 01/19/2024    ALKPHO 110 01/13/2024    BILT 1.6 (H) 01/13/2024    TP 5.6 (L) 01/13/2024    AST 38 (H) 01/13/2024    ALT 15 01/13/2024    PTT 42.1 (H) 01/15/2024    INR 1.60 (H) 01/15/2024    T4F 1.2 02/03/2024    TSH 10.681 (H) 02/03/2024    LIP 32 01/13/2024    MG 2.0 02/07/2024    PHOS 2.5 02/07/2024    B12 >2,000 (H) 02/03/2024       No results found.        Results:     CBC:    Lab Results   Component Value Date    WBC 2.5 (L) 02/07/2024    WBC 2.0 (L) 02/06/2024    WBC 3.0 (L) 02/05/2024     Lab Results   Component Value Date    HGB 8.6 (L) 02/07/2024    HGB 8.6 (L) 02/06/2024    HGB 9.3 (L) 02/05/2024      Lab Results   Component Value Date    PLT 32.0 (L) 02/07/2024    PLT 44.0 (L) 02/06/2024    PLT 63.0 (L) 02/05/2024       Recent Labs   Lab 02/05/24  0635 02/06/24  0742 02/07/24  0712   GLU 88 82 88   BUN 17 24* 15   CREATSERUM 1.27* 1.25* 1.06*   CA 8.4* 8.5* 8.2*    139 137   K 4.4 4.5 4.1    106 105   CO2 24.0 22.0 24.0             Assessment and Plan:           Assessment & Plan:    Acute on chronic combined CHF  Severe tricuspid valve regurgitation  Bioprosthetic mitral valve  NICM status post ICD in September 2023  Recent echo showed ejection fraction 37% with grade 4 tricuspid regurg  Cardiology is now on consult  Lasix given in the emergency room and started on Bumex 1 mg twice daily diuresis as per cards; legs swollen; cxr no chf  Continue Entresto and carvedilol  Sp pacer interrogation   Diuresing well with bumex   Holding doac due to recent GI bleed, wound benefit from watchman in near future , although pt has refused in the past        2. Pancytopenia/ worsening severe acute TCP:  -had similar issue on last admission Dr Snyder was consulted; thought to be consumptive in seeing of acute illness vs drug induced at the time.  - apprec Hem input - platelets 32 but no indication for transfusion today.   - hb stable. Recent gi bleed      3. Paroxysmal atrial fibrillation   Hold oral anticoagulation given recent GI bleed  Currently rate controlled  Still considering outpatient Watchman procedure given her recurrent GI bleeds     4. Chronic kidney injury stage III: stable  Baseline creatinine is about 1.3  Current creatinine 1.34  Follow urine output              Perceived  acute hypoxic respiratory failure likely 2/2 CHF    - unusual discoid atelectasis on cxr; elevated procalc   -SARS/Influenza, RSV neg 2/2/24  - no evidence of pna   - on room air      DVT ppx rons               Rosalinda Barry,          Chart reviewed, including current vitals, notes, labs and imaging  Labs ordered and medications adjusted as outlined above  Coordinate care with care team/consultants  Discussed with patient results of tests, management plan as outlined above, and the need for ongoing hospitalization  D/w RN     MDM high        2/7/2024

## 2024-02-08 NOTE — SLP NOTE
SPEECH DAILY NOTE - INPATIENT    ASSESSMENT & PLAN   ASSESSMENT    Proper PPE worn. Hands sanitized upon entrance/exit Pt room.       Pt alert, afebrile and on room air. Pt seen for swallow analysis per BSE recommendations (after consulting with RN). Pt agreed to participate. Pt's preferred method of learning verbal or demonstration. Pt upright in chair; observed with current diet of solid/thin liquids for monitoring diet tolerance. Swallowing precautions/strategies discussed; Pt able to self-cue for use. Functional bite reflex/mastication/lingual skills on solids. No significant oral retention. Pharyngeal response appeared to trigger within 1-2 sec per hyolaryngeal elevation to completion (functional rise/strength per palpation). No overt CSA on solid nor thin liquids (no coughing, no throat clearing, clear vocal quality and no SOB). Collaborated with RN regarding Pt's swallowing plan of care. Per RN, Pt with good tolerance of current diet. No report of difficulty taking meds. No newt CXR completed. Sp02 ~99% during this session. Call light within Pt's reach upon SLP discharge from room.      CXR 2/05/24:  CONCLUSION:    Stable cardiomegaly with mild vascular congestion.      Mild increased opacification of the right lung base which may reflect small effusion and atelectasis and or pneumonia.      Small left pleural effusion.       PLAN: Pt is discharged from skilled swallowing intervention secondary to functional swallowing skills on least restrictive. IF MD desires to r/o SILENT aspiration, VFSS should be ordered.          Diet Recommendations - Solids: Regular  Diet Recommendations - Liquids: Thin Liquids    Compensatory Strategies Recommended: Slow rate  Aspiration Precautions: Upright position;Slow rate  Medication Administration Recommendations:  (as tolerated)    Patient Experiencing Pain: No  Treatment Plan  Treatment Plan/Recommendations: Aspiration precautions  Interdisciplinary Communication: Discussed  with RN    GOALS  Goal #1 The patient will tolerate regular consistency and thin liquids without overt signs or symptoms of aspiration with 100 % accuracy over 1-2 session(s).    No CSA on current diet.       GOAL MET      Goal #2 The patient/family/caregiver will demonstrate understanding and implementation of aspiration precautions and swallow strategies independently over 1-2 session(s).    Pt self-cued for execution of strategies.    GOAL MET     FOLLOW UP  Follow Up Needed (Documentation Required): No  SLP Follow-up Date: 02/08/24  Number of Visits to Meet Established Goals:  (1-2)    Session: 1    If you have any questions, please contact   Rena Shaw M.S. [facility]/SLP  Speech-Language Pathologist  [facility]  #[phone]

## 2024-02-08 NOTE — CM/SW NOTE
Pt now w/low platelets requiring infusion which will be given this evening. Pt's ins auth for [facility] at [facility] expires today 2/8.  requested that SAUNDRA Leyva request extenstion until 2/10 via [employer] portal.    1610: SAUNDRA Leyva notified  that ins auth can be extended until 2/9, but a new auth will need to be resubmitted if patient is held passed that date.    Plan: [facility] for C[facility]SAR pending medical clearance.    Ayush Silverio, EVAN    [phone]

## 2024-02-08 NOTE — PROGRESS NOTES
[facility]    Progress Note    Margaret Silverio Patient Status:  Inpatient    3/14/1945 MRN [id number]   Location [facility] 3W/SW Attending Rosalinda Barry DO   Hosp Day # 5 PCP Johnathon Rodriguez,      Chief complaint chf     Subjective:   Margaret Silverio is a(n) 78 year old female Pt doing well today but does have LUE swelling. No pain     ROS:   No cp, sob   No c/d   No n/v     Objective:   Blood pressure 133/87, pulse 60, temperature 97.3 °F (36.3 °C), temperature source Oral, resp. rate 18, weight 149 lb (67.6 kg), SpO2 99%.      Intake/Output Summary (Last 24 hours) at 2024 1645  Last data filed at 2024 0838  Gross per 24 hour   Intake 520 ml   Output 300 ml   Net 220 ml       Patient Weight(s) for the past 336 hrs:   Weight   24 0434 149 lb (67.6 kg)   24 0457 149 lb 8 oz (67.8 kg)   24 1108 150 lb 14.4 oz (68.4 kg)   24 0400 149 lb 6.4 oz (67.8 kg)   24 0635 148 lb 14.4 oz (67.5 kg)   24 0453 149 lb 9.6 oz (67.9 kg)   24 0032 149 lb 14.4 oz (68 kg)   24 135 lb (61.2 kg)           General appearance: alert, appears stated age and cooperative  Pulmonary:  clear to auscultation bilaterally  Cardiovascular: S1, S2 normal, no murmur, click, rub or gallop, regular rate and rhythm  Abdominal: soft, non-tender; bowel sounds normal; no masses,  no organomegaly  Extremities: extremities normal, atraumatic, no cyanosis or edema        Medicines:           Lab Results   Component Value Date    WBC 3.7 (L) 2024    HGB 8.4 (L) 2024    HCT 25.6 (L) 2024    PLT 18.0 (LL) 2024    CREATSERUM 1.11 (H) 2024    BUN 16 2024     2024    K 3.8 2024     2024    CO2 25.0 2024    GLU 87 2024    CA 8.5 (L) 2024    ALB 3.2 2024    ALKPHO 110 2024    BILT 1.6 (H) 2024    TP 5.6 (L) 2024    AST 38 (H) 2024    ALT 15 2024     PTT 42.1 (H) 01/15/2024    INR 1.60 (H) 01/15/2024    T4F 1.2 02/03/2024    TSH 10.681 (H) 02/03/2024    LIP 32 01/13/2024    MG 2.0 02/07/2024    PHOS 2.5 02/08/2024    B12 >2,000 (H) 02/03/2024       US VENOUS DOPPLER ARM LEFT - DIAG IMG (CPT=93971)    Result Date: 2/8/2024  CONCLUSION:  1. 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  Patient's nurse was notified of the findings by ultrasound technologist.    Dictated by (CST): Jesus Jackson MD on 2/08/2024 at 1:26 PM     Finalized by (CST): Jesus Jackson MD on 2/08/2024 at 1:31 PM               Results:     CBC:    Lab Results   Component Value Date    WBC 3.7 (L) 02/08/2024    WBC 3.3 (L) 02/08/2024    WBC 2.5 (L) 02/07/2024     Lab Results   Component Value Date    HGB 8.4 (L) 02/08/2024    HGB 8.5 (L) 02/08/2024    HGB 8.6 (L) 02/07/2024      Lab Results   Component Value Date    PLT 18.0 (LL) 02/08/2024    PLT 28.0 (L) 02/08/2024    PLT 32.0 (L) 02/07/2024       Recent Labs   Lab 02/06/24  0742 02/07/24  0712 02/08/24  0705   GLU 82 88 87   BUN 24* 15 16   CREATSERUM 1.25* 1.06* 1.11*   CA 8.5* 8.2* 8.5*    137 138   K 4.5 4.1 3.8    105 105   CO2 22.0 24.0 25.0             Assessment and Plan:           Assessment & Plan:   Acute on chronic combined CHF  Severe tricuspid valve regurgitation  Bioprosthetic mitral valve  NICM status post ICD in September 2023--> US with nonocclusive clot on icd lead in subclavian vein. Cards aware   Recent echo showed ejection fraction 37% with grade 4 tricuspid regurg  Cardiology is now on consult  Lasix given in the emergency room and started on Bumex 1 mg twice daily diuresis as per cards; legs swollen; cxr no chf  Continue Entresto and carvedilol  Sp pacer interrogation   Diuresing well with bumex   Holding doac due to recent GI bleed, wound benefit from watchman in near future , although pt has refused in the past        2. Pancytopenia/ worsening severe acute TCP:  -had  similar issue on last admission Dr Snyder was consulted; thought to be consumptive in seeing of acute illness vs drug induced at the time. No h/o heparin use   - apprec Hem input - platelets now 23 and will get one unit today   - hb stable. Recent gi bleed      3. Paroxysmal atrial fibrillation   Hold oral anticoagulation given recent GI bleed  Currently rate controlled  Still considering outpatient Watchman procedure given her recurrent GI bleeds     4. Chronic kidney injury stage III: stable  Baseline creatinine is about 1.3  Current creatinine 1.34  Follow urine output              Perceived  acute hypoxic respiratory failure likely 2/2 CHF    - unusual discoid atelectasis on cxr; elevated procalc   -SARS/Influenza, RSV neg 2/2/24  - no evidence of pna   - on room air      DVT ppx scds               Rosalinda Barry,          Chart reviewed, including current vitals, notes, labs and imaging  Labs ordered and medications adjusted as outlined above  Coordinate care with care team/consultants  Discussed with patient results of tests, management plan as outlined above, and the need for ongoing hospitalization  D/w RN     MALLORY high        Dispo: To rehab when platelets are stable

## 2024-02-08 NOTE — CONSULTS
[facility]  Palliative Care Initial Consult Note    Margaret Silverio Patient Status:  Inpatient    3/14/1945 MRN [id number]   Location [facility] 3W/SW Attending Rosalinda Barry DO   Hosp Day # 5 PCP Johnathon Rodriguez DO     Date of Consult: 2024  Patient seen at: [facility] Inpatient    The  Cures Act makes medical notes like these available to patients in the interest of transparency. Please be advised this is a medical document. Medical documents are intended to carry relevant information, facts as evident, and the clinical opinion of the practitioner. The medical note is intended as peer to peer communication and may appear blunt or direct. It is written in medical language and may contain abbreviations or verbiage that are unfamiliar.     Reason for Consultation: Consult ordered by:: Dr. Barry for evaluation of Palliative Care needs and Goals of care discussion;Uncontrolled symptoms.    Subjective     History of Present Illness: Margaret Silverio is a 78 year old female with history of systolic CHF, AFib, who was admitted on 2024 for s/p fall, worsening SOB and LE edema. See below for reviewed labs and imaging. Pt was admitted for treatment and evaluation of acute on chronic CHF exacerbation and hypoglycemia. See below for reviewed imaging.     She is being followed by cardiology and hematology services.    She has a history of systolic CHF EF 35%, mitral value repair, severe TVR, SSS s/p pacemaker/ICD, recent GIB d/t AVM's, rheumatoid arthritis, CKD and AFib.  Reviewed labs and imaging. History was obtained from [facility] and pt.  Today is day 6 of hospitalization. This is his second hospitalization in the past month.    Pt is listed as full code in Epic,  no advance directives on file.     Reviewed symptom needs past 24 hrs: Norco 10/325 mg X2, Tramadol 50 mg po X1    Patient was seen and examined in bed with her  Low at bedside. Pt is A&OX4  and very pleasant. She appears weak and reports fatigue. Denies dyspnea symptoms on RA. She talked with me about her chronic RA pain mostly in hands but also generalized. She tells me she has been taking Norco 10/325 mg prn also uses at home, which has been helping a lot. Current hand pain is 8/10, but she tells me she doesn't want any pain meds. Appetite has been poor for some time. Denies abdominal pain, n/v and no BM in 6 days but says she doesn't really feel constipated. Discussed need for good  bowel regimen while on opioids. Sleeping fairly well, denies issues with mood or anxiety. See physical exam and ROS below.    Review of Systems/Palliative Care symptom needs assessed:   Pertinent items are noted in HPI/below    Fatigue:  Yes  Dyspnea: denies   Current O2 therapy: RA  Cough: denies  Pain Present: chronic bilateral hand pains r/t RA, generalized pain  Non-verbal signs of pain present: NO  Appetite: poor  Constipation: +no BM in 6 days  Diarrhea: denies  Nausea/Vomiting: denies  Depression: denies  Anxiety: denies    Medical History: obtained from [facility]  Past Medical History:   Diagnosis Date    Anemia     Arrhythmia     Arthritis     Deep vein thrombosis (HCC)     Essential hypertension     High blood pressure     History of blood transfusion     Seizure disorder (HCC)     Seizures (HCC)    Rheumatoid arthritis  CHF  GIB  CKD  Severe TVR  Mitral valve repair  Past Surgical History:   Procedure Laterality Date    COLONOSCOPY N/A 01/17/2024    Dr. Rios    COLONOSCOPY N/A 1/17/2024    Procedure: COLONOSCOPY;  Surgeon: Zoraida Rios MD;  Location: [facility] ENDOSCOPY    EGD N/A 01/17/2024    Dr. Rios    OTHER SURGICAL HISTORY  2017    Heart Surgery     OTHER SURGICAL HISTORY  2020    Bilateral shoulder replacement        Family History: obtained from [facility]  No family history on file.    Palliative Care Social History:   Marital Status:   Children: 3 kids  Living Situation Prior to Admit: lives with    Does  Patient Live Alone: no  Is Patient Confused: no  Occupational History: Retired, teacher,     Substance History:   reports that she quit smoking about 10 years ago. Her smoking use included cigarettes. She smoked an average of 0.3 packs per day. She has never used smokeless tobacco.  reports no history of alcohol use.  reports no history of drug use.  Hx of Substance Use/Abuse: No    Spiritual Assessment:   Islam: Congregation   requested: pt declined    Allergies:  Allergies   Allergen Reactions    Lisinopril Coughing       Medications:     Current Facility-Administered Medications:     potassium phosphate dibasic 15 mmol in sodium chloride 0.9% 250 mL IVPB, 15 mmol, Intravenous, Once    furosemide (Lasix) tab 40 mg, 40 mg, Oral, Daily    senna-docusate (Senokot-S) 8.6-50 MG per tab 2 tablet, 2 tablet, Oral, Nightly    traMADol (Ultram) tab 50 mg, 50 mg, Oral, Q6H PRN    polyethylene glycol (PEG 3350) (Miralax) 17 g oral packet 17 g, 17 g, Oral, Daily PRN    magnesium hydroxide (Milk of Magnesia) 400 MG/5ML oral suspension 30 mL, 30 mL, Oral, Daily PRN    bisacodyl (Dulcolax) 10 MG rectal suppository 10 mg, 10 mg, Rectal, Daily PRN    fleet enema (Fleet) 7-19 GM/118ML rectal enema 133 mL, 1 enema, Rectal, Once PRN    amitriptyline (Elavil) tab 25 mg, 25 mg, Oral, Nightly    carvedilol (Coreg) tab 25 mg, 25 mg, Oral, BID    sacubitril-valsartan (Entresto) 24-26 MG per tab 1 tablet, 1 tablet, Oral, BID    ferrous sulfate DR tab 325 mg, 325 mg, Oral, Daily with breakfast    folic acid (Folvite) tab 800 mcg, 800 mcg, Oral, Daily    HYDROcodone-acetaminophen (Norco)  MG per tab 1 tablet, 1 tablet, Oral, Q6H PRN    pantoprazole (Protonix) DR tab 40 mg, 40 mg, Oral, BID AC    alendronate (Fosamax) tab 70 mg, 70 mg, Oral, Weekly    Nutritional status:  BMI: 24 Weight: 149  Weight changes: fluctuating wts r/t edema  Current Appetite: Poor  Dysphagia: denies    Functional Status History:  ADLs:  now mostly in bed and needs help with ADL's, previously using walker at home  Recent Falls: Yes    Palliative Performance Scale:   Prior to admission: 60%  Observed during hospitalization: 40%  % Ambulation Activity Level Self-Care Intake Consciousness   100 Full  Normal  No Disease Full Normal Full   90 Full  Normal  Some Disease Full Normal Full   80 Full  Normal w/effort  Some Disease Full Normal or reduced Full   70 Reduced  Can't Perform Job  Some Disease Full Normal or reduced Full   60 Reduced  Can't Perform Hobby   Significant Disease Occ Assist Normal or reduced Full or confused   50 Mainly sit/lie Can't do any work  Extensive Disease Partial Assist Normal or reduced Full or confused   40 Mainly in bed Can't do any work  Extensive Disease Mainly Assist Normal or reduced Full or confused   30 Bed Bound Can't do any work  Extensive Disease Max Assist  Total Care Reduced  Drowsy/confused   20 Bed Bound Can't do any work  Extensive Disease Max Assist  Total Care Minimal  Drowsy/confused   10 Bed Bound Can't do any work  Extensive Disease Max Assist  Total Care Mouth Care  Drowsy/confused   0 Death        Objective      Vital Signs:  Blood pressure 133/87, pulse 60, temperature 97.3 °F (36.3 °C), temperature source Oral, resp. rate 18, weight 149 lb (67.6 kg), SpO2 99%.  Body mass index is 24.79 kg/m².  Present Level of pain: 8/10 bilateral hands  Non-verbal signs of pain present: No    Physical Exam:  General: Alert and in no apparent distress. Weak appearing  HEENT: No focal deficits.  Cardiac: Regular rate and rhythm, S1, S2 normal, no murmur, rub or gallop.  Lungs: Clear without wheezes, rales, rhonchi or dullness.  Normal excursions and effort.  Abdomen: Soft, non-tender, normal bowel sounds X 4 quadrants, no rebound or guarding  Extremities: Without clubbing, cyanosis. Peripheral pulses are 2+. BLE Edema not present, severe hand deformities r/t RA  Neurologic: Alert and oriented X4, normal affect.  Skin:  Warm and dry.    Hematology:  Lab Results   Component Value Date    WBC 3.3 (L) 2024    HGB 8.5 (L) 2024    HCT 25.8 (L) 2024    PLT 28.0 (L) 2024       Coags:  Lab Results   Component Value Date    INR 1.60 (H) 01/15/2024    PTT 42.1 (H) 01/15/2024       Chemistry:  Lab Results   Component Value Date    CREATSERUM 1.11 (H) 2024    BUN 16 2024     2024    K 3.8 2024     2024    CO2 25.0 2024    GLU 87 2024    CA 8.5 (L) 2024    ALB 3.2 2024    ALKPHO 110 2024    BILT 1.6 (H) 2024    TP 5.6 (L) 2024    AST 38 (H) 2024    ALT 15 2024    MG 2.0 2024    PHOS 2.5 2024       Imagin/14/24 Echocardiogram  Conclusions:     1. Left ventricle: The cavity size was normal. Wall thickness was normal.      Systolic function was moderately reduced. The estimated ejection fraction      was 35-40%, by biplane method of disks. Unable to assess LV diastolic      function due to heart rhythm.   2. Right ventricle: The cavity size was increased. Systolic function was      reduced.   3. Left atrium: The left atrial volume was moderately increased.   4. Right atrium: The atrium was mildly dilated.   5. Mitral valve: A bioprosthesis is present. The mean diastolic gradient was      5mm Hg.   6. Tricuspid valve: There was severe regurgitation.   7. Pulmonary arteries: Systolic pressure was mildly increased, estimated to      be 43mm Hg.     24 CXR  CONCLUSION:      Stable cardiomegaly with mild vascular congestion.      Mild increased opacification of the right lung base which may reflect small effusion and atelectasis and or pneumonia.      Small left pleural effusion.       2/3/24 Lumbar spine X-ray  CONCLUSION:   1. No radiographically visible acute osseous injury of the lumbar spine.   2. Mild-to-moderate multilevel lumbar spine degenerative changes.   3. Mild levoscoliosis of the lumbar spine.    4. Demineralization.   5. Lesser incidental findings as above.       2/3/24 L shoulder X-ray  CONCLUSION:   1. No radiographically visible acute osseous injury of the left shoulder.   2. Reverse left shoulder arthroplasty, which appears radiographically uncomplicated.   3. Demineralization.   4. Partially imaged left chest wall pacemaker.         2/3/24 R shoulder Xray  CONCLUSION:   1. No radiographically visible acute osseous injury of the right shoulder.   2. Postsurgical changes of right reverse shoulder arthroplasty with radiographically uncomplicated hardware.   3. Mild acromioclavicular joint osteoarthritis.   4. Partially imaged hazy opacity at the right lung base, which is better assessed on recent prior chest radiograp       Summary of GOC Discussion        I discussed reason for palliative care consultation with patient and her  in person, also discussed with isidro Landon by phone.     I differentiated the palliative treatment-focus model versus the hospice comfort-focused philosophy of care. I informed the patient/family that having palliative care support does not limit medical treatment options or decisions to those who wish to continue curative or restorative medical therapies. I discussed the benefits of palliative care to include assistance with arising symptom management needs, an extra layer of support, to ensure GOC are respected throughout healthcare continuum, and assist with transition to hospice care when appropriate.  Palliative care handout provided.    Outpatient/Community Palliative Care Services:  Usually visit once per 4 weeks depending on contracted agency guidelines  Focus on GOC and symptom management   Palliative Care criteria:  Not altered by prognosis   Does not limit curative or restorative therapies      Outpatient Hospice services:  24/7 phone triage services   RN visit one or more times per week depending on need  Home health aid to assist in ADLs/hygiene   Hospice  criteria:  Less than six-month prognosis   Must forego most life-prolonging  measures/treatments   Focus solely on comfort   Must sign onto hospice benefit with agency       Prognostic awareness/understanding: Fair-will need reinforcement by MDs    -I  discussed current clinical condition and explored previous discussions with MDs.    -I discussed the normal disease trajectory of CHF, severe TVR, pancytopenia with associated symptoms and decline over time.     Hopes/goals/concerns:   -Pt remains hopeful she will improve with ongoing medical treatments.    -She is agreeable to continuing with supportive care and we discussed plan for CÉSAR on dc. I recommended having a community palliative care program follow on dc.     -Pt prefers to have further discussion when her dtr Barbi can be present and I set up a follow up meeting with pt and her family tomorrow at noon.     -They are agreeable to palliative care following.       Advance Care Planning counseling and discussion:     -I discussed the importance of advance care planning prior to crisis with pt. She admits she has not completed advance directives in the past. Discussed I can help facilitate completion of advance directives if she is interested.    -I discussed importance of HCPOA and provided blank [place]CPOA paperwork form for review. [place]ed [place] her  Low would be her HC surrogate until POA papework is completed, She may be interested in completion once talking further with her family.      -I addressed pt's full code status discussed the risks vs benefits of life sustaining treatments in the setting of advancing age and in her clinical picture. Education provided on what DNAR entails and encouraged setting limits. Pt tells me she did start to think about these discussions recently with her dtr and would like to discuss further when both her  and dtr can be present. [place] did [place]. Pt will remain  full code.    -Provided emotional support to pt/family who are coping adequately.     Assessment and Recommendations      Problem List:    Acute on chronic combined CHF  Severe TVR  Bioprosthetic mitral valve  Pacemaker/AICD  Pancytopenia/worsening thrombocytopenia  Pafib  Recent GIB  CKD  Rheumatoid arthritis  Weakness    Chronic pain r/t RA  -Controlled  -Continue home Norco 10/325 mg 1 tab po Q 6 hrs prn severe pain  -Continue alternating with Tramadol 50 mg po Q 6 hrs prn mod pain    Constipation  -Likely opioid induced, no BM in 6 days  -DC Colace, add Senna S 2 tabs nightly.  -Continue Miralax prn and ducolax supp prn  -Education provided on adequately bowel regimen while taking opioids.     Goals of care counseling  -see above for details  -Pt is full code; encouraged setting limits  -Ongoing GOC discussions will be needed through clinical course and over time.  -I have arranged family meeting tomorrow at noon with her dtr Barbi and  for ongoing GOC/ACP discussions.  -Agreeable to palliative care following  -Dispo:  CÉSAR and recommended community palliative care program to follow on dc. SW to help with dc planning.   -Provided emotional support to pt/family who are coping adequately.    Advance care planning  -see above for details  -Pt has not completed any advance directives in the past.   -Pt's  Low is HC surrogate #[phone].  -[place] for review and offered to help facilitate completion if interested.    Palliative Performance Scale 40%    Emotion support provided to patient/family today: Yes    A total of 55 mins were spent on this consult, which included all of the following:direct face to face contact, history taking, physical examination, and >50% was spent counseling and coordinating care.    Discussed today's visit with message to Dr. Barry and Regina GIL.    I will continue to follow clinically.    Thank you for allowing Palliative Care  services to participate in the care of Ms. Margaret Silverio.       Aparna Mahan, VICKI-BC, [facility] [id number]  2/8/2024  1:30  PM  Palliative Care Services

## 2024-02-08 NOTE — DIETARY NOTE
BRIEF DIETITIAN NOTE     Pt re-screened for early LOS (length of stay). Found to be at no nutrition risk at this time. Good PO intakes, % at most meals during admission. Weight relatively stable, no significant changes noted. Some fluctuations may be r/t diuresis. Fluid restriction noted. CHF/low sodium diet education completed on 2/5. Please consult RD if further nutrition intervention is needed.     Percent Meals Eaten (last 6 days)       Date/Time Percent Meals Eaten (%)    02/03/24 0900 70 %    02/03/24 1709 80 %    02/04/24 0900 80 %    02/04/24 1300 80 %    02/04/24 1700 60 %    02/05/24 1201 100 %    02/06/24 1108 50 %    02/06/24 1417 20 %    02/07/24 0811 70 %    02/07/24 1300 80 %    02/07/24 1741 0 %    02/08/24 0838 0 %             Patient Weight(s) for the past 336 hrs:   Weight   02/08/24 0434 67.6 kg (149 lb)   02/07/24 0457 67.8 kg (149 lb 8 oz)   02/06/24 1108 68.4 kg (150 lb 14.4 oz)   02/06/24 0400 67.8 kg (149 lb 6.4 oz)   02/05/24 0635 67.5 kg (148 lb 14.4 oz)   02/04/24 0453 67.9 kg (149 lb 9.6 oz)   02/03/24 0032 68 kg (149 lb 14.4 oz)   02/02/24 2053 61.2 kg (135 lb)        Wt Readings from Last 6 Encounters:   02/08/24 67.6 kg (149 lb)   01/20/24 69.7 kg (153 lb 11.2 oz)   12/26/23 65.8 kg (145 lb)   09/21/23 65.8 kg (145 lb)   08/21/23 64.3 kg (141 lb 12.8 oz)   07/17/23 64 kg (141 lb)        F/u per protocol or as appropriate.       Sydney Edwards RD, LDN  Clinical Dietitian  P: [phone]

## 2024-02-08 NOTE — PROGRESS NOTES
Hematology Oncology Progress Note    Patient Name: Margaret Silverio   YOB: 1945   Medical Record Number: [id number]   CSN: [id number]   Attending Physician: Briana Snyder MD     Subjective:  No acute events.   C/p left neck pain and left arm swelling.   Still very weak. No abnormal bleeding reported.     Objective:  Vitals:  Vitals:    02/07/24 1741 02/07/24 2043 02/08/24 0434 02/08/24 0837   BP: 125/63 124/64 129/76 133/87   BP Location: Right arm Right arm Right arm Left arm   Pulse: 60 60 60 60   Resp: 18 18 18 18   Temp: 97.6 °F (36.4 °C) 97.5 °F (36.4 °C) 97.6 °F (36.4 °C) 97.3 °F (36.3 °C)   TempSrc: Oral Oral Oral Oral   SpO2: 97% 97% 99% 99%   Weight:   67.6 kg (149 lb)        Current Medications:    Current Facility-Administered Medications:     furosemide (Lasix) tab 40 mg, 40 mg, Oral, Daily    senna-docusate (Senokot-S) 8.6-50 MG per tab 2 tablet, 2 tablet, Oral, Nightly    sodium chloride 0.9% infusion, , Intravenous, Once    traMADol (Ultram) tab 50 mg, 50 mg, Oral, Q6H PRN    polyethylene glycol (PEG 3350) (Miralax) 17 g oral packet 17 g, 17 g, Oral, Daily PRN    magnesium hydroxide (Milk of Magnesia) 400 MG/5ML oral suspension 30 mL, 30 mL, Oral, Daily PRN    bisacodyl (Dulcolax) 10 MG rectal suppository 10 mg, 10 mg, Rectal, Daily PRN    fleet enema (Fleet) 7-19 GM/118ML rectal enema 133 mL, 1 enema, Rectal, Once PRN    amitriptyline (Elavil) tab 25 mg, 25 mg, Oral, Nightly    carvedilol (Coreg) tab 25 mg, 25 mg, Oral, BID    sacubitril-valsartan (Entresto) 24-26 MG per tab 1 tablet, 1 tablet, Oral, BID    ferrous sulfate DR tab 325 mg, 325 mg, Oral, Daily with breakfast    folic acid (Folvite) tab 800 mcg, 800 mcg, Oral, Daily    HYDROcodone-acetaminophen (Norco)  MG per tab 1 tablet, 1 tablet, Oral, Q6H PRN    pantoprazole (Protonix) DR tab 40 mg, 40 mg, Oral, BID AC    alendronate (Fosamax) tab 70 mg, 70 mg, Oral, Weekly    Physical Examination:  General: Lethargic,  arousable, oriented.   Chest: Clear to auscultation.  Heart: Regular rate and rhythm.   Abdomen: Soft, non tender  Extremities: Pitting edema bilateral LE edema. +LUE edema  Neurological: Grossly intact.     Labs:  Recent Labs   Lab 02/06/24  0742 02/07/24  0712 02/08/24  0705 02/08/24  1501   RBC 2.80* 2.75* 2.78* 2.82*   HGB 8.6* 8.6* 8.5* 8.4*   HCT 26.6* 25.9* 25.8* 25.6*   MCV 95.0 94.2 92.8 90.8   MCH 30.7 31.3 30.6 29.8   MCHC 32.3 33.2 32.9 32.8   RDW 19.7* 19.5* 18.9* 18.7*   NEPRELIM 1.21* 1.73 2.12  --    WBC 2.0* 2.5* 3.3* 3.7*   PLT 44.0* 32.0* 28.0* 18.0*     Recent Labs   Lab 02/06/24  0742 02/07/24  0712 02/08/24  0705   GLU 82 88 87   BUN 24* 15 16   CREATSERUM 1.25* 1.06* 1.11*   EGFRCR 44* 54* 51*   CA 8.5* 8.2* 8.5*    137 138   K 4.5 4.1 3.8    105 105   CO2 22.0 24.0 25.0      No results for input(s): \"PT\", \"INR\", \"PTT\" in the last 168 hours.     Radiology:  US VENOUS DOPPLER ARM LEFT - DIAG IMG (CPT=93971)    Result Date: 2/8/2024  CONCLUSION:  1. 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  Patient's nurse was notified of the findings by ultrasound technologist.    Dictated by (CST): Jesus Jackson MD on 2/08/2024 at 1:26 PM     Finalized by (CST): Jesus Jackson MD on 2/08/2024 at 1:31 PM            Impression and Plan:  78 year old AAF with acute on chronic CHF, chronic anemia, hematology consulted for worsening pancytopenia similar to recent admission:       Thrombocytopenia  - severe acute thrombocytopenia, with normal count at baseline  - probably consumptive in the setting of acute heart failure and acute thrombosis, similar to recent admission, platelet improved and back to normal at discharge.   - peripheral smear with no platelet clumps or schistocytes. No heparin exposure. No new medications.   - plt down to 28--> 18 today, transfuse 2 units now and monitor CBC q12hrs  - start steroids empirically for possible immune thrombocytopenia; dex  40 mg daily x4 days   - eliquis remains on hold    Left subclavian DVT  - nonocclusive DVT along the margin of ICD lead in the left subclavian vein  - may resume anticoagulation if plt count improves > 50 and ok from GI and cards standpoint    Acute on chronic anemia   - chronic multifactorial anemia; anemia of chronic disease, renal failure, drug induced (methotrexate), recent GI blood loss in the setting of anticoagulation  - recent EGD/CLN 1/17 small transverse colon AVM s/p APC likely was the source of bleeding.   - prior BMBx was negative per patient- done in [place] few years ago. no records available.   - recent work up showed no evidence of hemolysis or nutritional deficiency.   - currently no sings or symptoms of active bleeding. Hgb relatively stable.   - sTR and SPEP pending for complete work up.   - consider watchman's given h/o GIB with anticoagulation.   - monitor CBC and transfuse as needed to keep hgb => 8 due to cardiac disease.     Leukopenia  - mainly lymphopenia, likely drug induced vs autoimmune. Stable.   - may need repeat BMBx if cytopenias persist after her acute issues resolve      Rheumatoid arthritis   - on methotrexate > 20 yrs and humira added 2021     Acute on chronic CHF:  - management per cardiology, on bumex, entresto, coreg      Will continue to follow      Briana Snyder MD   [facility]

## 2024-02-08 NOTE — PLAN OF CARE
Patient electrolytes replaced per protocol today. Pain managed with heat packs, norco and tramadol. IV bumex continued per cardiology. Plan for [place] at discharge.     Problem: Patient Centered Care  Goal: Patient preferences are identified and integrated in the patient's plan of care  Description: Interventions:  - What would you like us to know as we care for you? I live with my  and two grandchildren  - Provide timely, complete, and accurate information to patient/family  - Incorporate patient and family knowledge, values, beliefs, and cultural backgrounds into the planning and delivery of care  - Encourage patient/family to participate in care and decision-making at the level they choose  - Honor patient and family perspectives and choices  Outcome: Progressing     Problem: Patient/Family Goals  Goal: Patient/Family Long Term Goal  Description: Patient's Long Term Goal: To get better    Interventions:  - IV diuretics, prn O2 supplementation, cardiac monitoring and electrolyte protocol  - See additional Care Plan goals for specific interventions  Outcome: Progressing  Goal: Patient/Family Short Term Goal  Description: Patient's Short Term Goal: To breathe better    Interventions:   - IV diuretics, O2 supplementation prn, cardiac monitoring, electrolyte protocol  - See additional Care Plan goals for specific interventions  Outcome: Progressing     Problem: CARDIOVASCULAR - ADULT  Goal: Maintains optimal cardiac output and hemodynamic stability  Description: INTERVENTIONS:  - Monitor vital signs, rhythm, and trends  - Monitor for bleeding, hypotension and signs of decreased cardiac output  - Evaluate effectiveness of vasoactive medications to optimize hemodynamic stability  - Monitor arterial and/or venous puncture sites for bleeding and/or hematoma  - Assess quality of pulses, skin color and temperature  - Assess for signs of decreased coronary artery perfusion - ex. Angina  - Evaluate  fluid balance, assess for edema, trend weights  Outcome: Progressing  Goal: Absence of cardiac arrhythmias or at baseline  Description: INTERVENTIONS:  - Continuous cardiac monitoring, monitor vital signs, obtain 12 lead EKG if indicated  - Evaluate effectiveness of antiarrhythmic and heart rate control medications as ordered  - Initiate emergency measures for life threatening arrhythmias  - Monitor electrolytes and administer replacement therapy as ordered  Outcome: Progressing     Problem: RESPIRATORY - ADULT  Goal: Achieves optimal ventilation and oxygenation  Description: INTERVENTIONS:  - Assess for changes in respiratory status  - Assess for changes in mentation and behavior  - Position to facilitate oxygenation and minimize respiratory effort  - Oxygen supplementation based on oxygen saturation or ABGs  - Provide Smoking Cessation handout, if applicable  - Encourage broncho-pulmonary hygiene including cough, deep breathe, Incentive Spirometry  - Assess the need for suctioning and perform as needed  - Assess and instruct to report SOB or any respiratory difficulty  - Respiratory Therapy support as indicated  - Manage/alleviate anxiety  - Monitor for signs/symptoms of CO2 retention  Outcome: Progressing     Problem: GENITOURINARY - ADULT  Goal: Absence of urinary retention  Description: INTERVENTIONS:  - Assess patient’s ability to void and empty bladder  - Monitor intake/output and perform bladder scan as needed  - Follow urinary retention protocol/standard of care  - Consider collaborating with pharmacy to review patient's medication profile  - Implement strategies to promote bladder emptying  Outcome: Progressing     Problem: Impaired Functional Mobility  Goal: Achieve highest/safest level of mobility/gait  Description: Interventions:  - Assess patient's functional ability and stability  - Promote increasing activity/tolerance for mobility and gait  - Educate and engage patient/family in tolerated activity  level and precautions  - Recommend use of sit-stand lift for transfers  Outcome: Progressing

## 2024-02-08 NOTE — OCCUPATIONAL THERAPY NOTE
Attempted to see pt for OT today. Left upper extremity ultrasound showed nonocclusive DVT along posterior margin of ICD lead in the left subclavian vein.  Patient recently off AC d/t GIB per Cardiology APN. Pt also with low PLTs today and receiving infusion. Will follow up with pt when medically stable      Tara Magana, OTR/L

## 2024-02-08 NOTE — PLAN OF CARE
Left upper extremity ultrasound showed nonocclusive DVT along posterior margin of ICD lead in the left subclavian vein.    Patient recently off AC d/t GIB.    Will discuss with Dr Arie silveira for AC.     Kelsi URBANO  2/8/2024  16:10

## 2024-02-08 NOTE — PLAN OF CARE
Lethargic. Severe pain managed w/ PRN Norco. Max assist. Plan: BT [place] [facility] pending insurance and medical clearance    Call light within reach, fall precautions in place  Problem: Patient Centered Care  Goal: Patient preferences are identified and integrated in the patient's plan of care  Description: Interventions:  - What would you like us to know as we care for you? I live with my  and two grandchildren  - Provide timely, complete, and accurate information to patient/family  - Incorporate patient and family knowledge, values, beliefs, and cultural backgrounds into the planning and delivery of care  - Encourage patient/family to participate in care and decision-making at the level they choose  - Honor patient and family perspectives and choices  Outcome: Progressing     Problem: CARDIOVASCULAR - ADULT  Goal: Maintains optimal cardiac output and hemodynamic stability  Description: INTERVENTIONS:  - Monitor vital signs, rhythm, and trends  - Monitor for bleeding, hypotension and signs of decreased cardiac output  - Evaluate effectiveness of vasoactive medications to optimize hemodynamic stability  - Monitor arterial and/or venous puncture sites for bleeding and/or hematoma  - Assess quality of pulses, skin color and temperature  - Assess for signs of decreased coronary artery perfusion - ex. Angina  - Evaluate fluid balance, assess for edema, trend weights  Outcome: Progressing  Goal: Absence of cardiac arrhythmias or at baseline  Description: INTERVENTIONS:  - Continuous cardiac monitoring, monitor vital signs, obtain 12 lead EKG if indicated  - Evaluate effectiveness of antiarrhythmic and heart rate control medications as ordered  - Initiate emergency measures for life threatening arrhythmias  - Monitor electrolytes and administer replacement therapy as ordered  Outcome: Progressing     Problem: RESPIRATORY - ADULT  Goal: Achieves optimal ventilation and oxygenation  Description: INTERVENTIONS:  - Assess  for changes in respiratory status  - Assess for changes in mentation and behavior  - Position to facilitate oxygenation and minimize respiratory effort  - Oxygen supplementation based on oxygen saturation or ABGs  - Provide Smoking Cessation handout, if applicable  - Encourage broncho-pulmonary hygiene including cough, deep breathe, Incentive Spirometry  - Assess the need for suctioning and perform as needed  - Assess and instruct to report SOB or any respiratory difficulty  - Respiratory Therapy support as indicated  - Manage/alleviate anxiety  - Monitor for signs/symptoms of CO2 retention  Outcome: Progressing     Problem: GENITOURINARY - ADULT  Goal: Absence of urinary retention  Description: INTERVENTIONS:  - Assess patient’s ability to void and empty bladder  - Monitor intake/output and perform bladder scan as needed  - Follow urinary retention protocol/standard of care  - Consider collaborating with pharmacy to review patient's medication profile  - Implement strategies to promote bladder emptying  Outcome: Progressing     Problem: Impaired Functional Mobility  Goal: Achieve highest/safest level of mobility/gait  Description: Interventions:  - Assess patient's functional ability and stability  - Promote increasing activity/tolerance for mobility and gait  - Educate and engage patient/family in tolerated activity level and precautions  - Recommend use of total lift for transfers  Outcome: Progressing

## 2024-02-09 PROBLEM — D69.3 IMMUNE THROMBOCYTOPENIA (HCC): Status: ACTIVE | Noted: 2024-01-01

## 2024-02-09 PROBLEM — D69.3 IMMUNE THROMBOCYTOPENIA (HCC): Status: ACTIVE | Noted: 2024-02-09

## 2024-02-09 LAB
ALBUMIN SERPL ELPH-MCNC: 2.99 G/DL (ref 3.75–5.21)
ALBUMIN SERPL-MCNC: 3 G/DL (ref 3.2–4.8)
ALBUMIN/GLOB SERPL: 1.07 {RATIO} (ref 1–2)
ALP LIVER SERPL-CCNC: 153 U/L
ALPHA1 GLOB SERPL ELPH-MCNC: 0.46 G/DL (ref 0.19–0.46)
ALPHA2 GLOB SERPL ELPH-MCNC: 0.55 G/DL (ref 0.48–1.05)
ALT SERPL-CCNC: 11 U/L
ANION GAP SERPL CALC-SCNC: 9 MMOL/L (ref 0–18)
APTT PPP: 35.2 SECONDS (ref 23.3–35.6)
AST SERPL-CCNC: 21 U/L (ref ?–34)
B-GLOBULIN SERPL ELPH-MCNC: 0.67 G/DL (ref 0.68–1.23)
BASOPHILS # BLD AUTO: 0.02 X10(3) UL (ref 0–0.2)
BASOPHILS NFR BLD AUTO: 0.4 %
BILIRUB DIRECT SERPL-MCNC: 1.3 MG/DL (ref ?–0.3)
BILIRUB SERPL-MCNC: 1.9 MG/DL (ref 0.2–1.1)
BUN BLD-MCNC: 17 MG/DL (ref 9–23)
BUN/CREAT SERPL: 15.3 (ref 10–20)
CALCIUM BLD-MCNC: 8.4 MG/DL (ref 8.7–10.4)
CHLORIDE SERPL-SCNC: 106 MMOL/L (ref 98–112)
CO2 SERPL-SCNC: 25 MMOL/L (ref 21–32)
CREAT BLD-MCNC: 1.11 MG/DL
DEPRECATED RDW RBC AUTO: 58 FL (ref 35.1–46.3)
DEPRECATED RDW RBC AUTO: 58.1 FL (ref 35.1–46.3)
EGFRCR SERPLBLD CKD-EPI 2021: 51 ML/MIN/1.73M2 (ref 60–?)
EOSINOPHIL # BLD AUTO: 0.19 X10(3) UL (ref 0–0.7)
EOSINOPHIL NFR BLD AUTO: 4.2 %
ERYTHROCYTE [DISTWIDTH] IN BLOOD BY AUTOMATED COUNT: 19.3 % (ref 11–15)
ERYTHROCYTE [DISTWIDTH] IN BLOOD BY AUTOMATED COUNT: 19.7 % (ref 11–15)
FIBRINOGEN PPP-MCNC: 294 MG/DL (ref 180–480)
GAMMA GLOB SERPL ELPH-MCNC: 1.14 G/DL (ref 0.62–1.7)
GLUCOSE BLD-MCNC: 84 MG/DL (ref 70–99)
HAPTOGLOB SERPL-MCNC: 23 MG/DL (ref 40–280)
HCT VFR BLD AUTO: 22.7 %
HCT VFR BLD AUTO: 25 %
HGB BLD-MCNC: 7.7 G/DL
HGB BLD-MCNC: 8.3 G/DL
IMM GRANULOCYTES # BLD AUTO: 0.03 X10(3) UL (ref 0–1)
IMM GRANULOCYTES NFR BLD: 0.7 %
INR BLD: 1.25 (ref 0.8–1.2)
LDH SERPL L TO P-CCNC: 340 U/L
LYMPHOCYTES # BLD AUTO: 0.35 X10(3) UL (ref 1–4)
LYMPHOCYTES NFR BLD AUTO: 7.7 %
MCH RBC QN AUTO: 30.7 PG (ref 26–34)
MCH RBC QN AUTO: 31.4 PG (ref 26–34)
MCHC RBC AUTO-ENTMCNC: 33.2 G/DL (ref 31–37)
MCHC RBC AUTO-ENTMCNC: 33.9 G/DL (ref 31–37)
MCV RBC AUTO: 92.6 FL
MCV RBC AUTO: 92.7 FL
MONOCYTES # BLD AUTO: 0.08 X10(3) UL (ref 0.1–1)
MONOCYTES NFR BLD AUTO: 1.8 %
NEUTROPHILS # BLD AUTO: 3.87 X10 (3) UL (ref 1.5–7.7)
NEUTROPHILS # BLD AUTO: 3.87 X10(3) UL (ref 1.5–7.7)
NEUTROPHILS NFR BLD AUTO: 85.2 %
OSMOLALITY SERPL CALC.SUM OF ELEC: 291 MOSM/KG (ref 275–295)
PHOSPHATE SERPL-MCNC: 2.7 MG/DL (ref 2.4–5.1)
PLATELET # BLD AUTO: 15 10(3)UL (ref 150–450)
PLATELET # BLD AUTO: 26 10(3)UL (ref 150–450)
POTASSIUM SERPL-SCNC: 3.7 MMOL/L (ref 3.5–5.1)
POTASSIUM SERPL-SCNC: 3.7 MMOL/L (ref 3.5–5.1)
PROT SERPL-MCNC: 5.8 G/DL (ref 5.7–8.2)
PROT SERPL-MCNC: 6 G/DL (ref 5.7–8.2)
PROTHROMBIN TIME: 16.5 SECONDS (ref 11.6–14.8)
RBC # BLD AUTO: 2.45 X10(6)UL
RBC # BLD AUTO: 2.7 X10(6)UL
SODIUM SERPL-SCNC: 140 MMOL/L (ref 136–145)
WBC # BLD AUTO: 3.2 X10(3) UL (ref 4–11)
WBC # BLD AUTO: 4.5 X10(3) UL (ref 4–11)

## 2024-02-09 PROCEDURE — 99231 SBSQ HOSP IP/OBS SF/LOW 25: CPT | Performed by: REGISTERED NURSE

## 2024-02-09 PROCEDURE — 99497 ADVNCD CARE PLAN 30 MIN: CPT | Performed by: REGISTERED NURSE

## 2024-02-09 PROCEDURE — 99233 SBSQ HOSP IP/OBS HIGH 50: CPT | Performed by: INTERNAL MEDICINE

## 2024-02-09 PROCEDURE — 99233 SBSQ HOSP IP/OBS HIGH 50: CPT | Performed by: HOSPITALIST

## 2024-02-09 RX ORDER — POLYETHYLENE GLYCOL 3350 17 G/17G
17 POWDER, FOR SOLUTION ORAL ONCE
Status: COMPLETED | OUTPATIENT
Start: 2024-02-09 | End: 2024-02-09

## 2024-02-09 RX ORDER — AMIODARONE HYDROCHLORIDE 200 MG/1
200 TABLET ORAL DAILY
Status: DISCONTINUED | OUTPATIENT
Start: 2024-02-09 | End: 2024-02-18

## 2024-02-09 RX ORDER — POTASSIUM CHLORIDE 20 MEQ/1
40 TABLET, EXTENDED RELEASE ORAL ONCE
Status: DISCONTINUED | OUTPATIENT
Start: 2024-02-09 | End: 2024-02-09

## 2024-02-09 RX ORDER — SPIRONOLACTONE 25 MG/1
25 TABLET ORAL DAILY
Status: DISCONTINUED | OUTPATIENT
Start: 2024-02-09 | End: 2024-02-16

## 2024-02-09 RX ORDER — SENNA AND DOCUSATE SODIUM 50; 8.6 MG/1; MG/1
2 TABLET, FILM COATED ORAL 2 TIMES DAILY
Status: DISCONTINUED | OUTPATIENT
Start: 2024-02-09 | End: 2024-02-13

## 2024-02-09 RX ORDER — SODIUM CHLORIDE 9 MG/ML
INJECTION, SOLUTION INTRAVENOUS ONCE
Status: DISCONTINUED | OUTPATIENT
Start: 2024-02-09 | End: 2024-02-09

## 2024-02-09 NOTE — CARDIAC REHAB
Cardiac Rehab Phase I    Activity:  Distance   Assistance needed   Patient tolerated activity .    Education:  Handouts provided and reviewed: [facility] Booklet.       Diet: Healthy Cardiac diet reviewed.    Disease Process: Disease process reviewed.    Reviewed the following:      RISK FACTORS: Reviewed      SMOKING CESSATION: Reviewed      HOME EXERCISE ACTIVITY: Reviewed      OUTPATIENT CARDIAC REHAB: Referred to Cardiac Rehabilitation

## 2024-02-09 NOTE — PALLIATIVE CARE NOTE
[facility]  Palliative Care Progress Note    Margaret Silverio Patient Status:  Inpatient    3/14/1945 MRN [id number]   Location [facility] 3W/SW Attending Rosalinda Barry,    Hosp Day # 6 PCP Johnathon Rodriguez,      The  Cures Act makes medical notes like these available to patients in the interest of transparency. Please be advised this is a medical document. Medical documents are intended to carry relevant information, facts as evident, and the clinical opinion of the practitioner. The medical note is intended as peer to peer communication and may appear blunt or direct. It is written in medical language and may contain abbreviations or verbiage that are unfamiliar.       Subjective     Reviewed symptom medications past 24 hrs: Norco 10/325 mg X2, Miralax X1    Reviewed notes: LUE +DVT    Patient was seen and examined in the chair with her family at the bedside. Pt is A&O X4 and very pleasant. She is reporting fatigue, but says she slept ok overnight. Denies dyspnea symptoms on RA. She tells me her pain symptoms has have been stable with [place] prn, denies pain currently. Appetite was good this morning, denies abdominal pain, n/v and still feeling constipated despite being started on bowel regimen.     See summary of discussion below.     Review of Systems:  Pertinent items are noted in subjective.    Allergies:  Allergies   Allergen Reactions    Lisinopril Coughing       Medications:     Current Facility-Administered Medications:     amiodarone (Pacerone) tab 200 mg, 200 mg, Oral, Daily    spironolactone (Aldactone) tab 25 mg, 25 mg, Oral, Daily    senna-docusate (Senokot-S) 8.6-50 MG per tab 2 tablet, 2 tablet, Oral, BID    furosemide (Lasix) tab 40 mg, 40 mg, Oral, Daily    sodium chloride 0.9% infusion, , Intravenous, Once    dexamethasone (Decadron) tab 40 mg, 40 mg, Oral, Daily with breakfast    traMADol (Ultram) tab 50 mg, 50 mg, Oral, Q6H PRN    polyethylene  glycol (PEG 3350) (Miralax) 17 g oral packet 17 g, 17 g, Oral, Daily PRN    magnesium hydroxide (Milk of Magnesia) 400 MG/5ML oral suspension 30 mL, 30 mL, Oral, Daily PRN    bisacodyl (Dulcolax) 10 MG rectal suppository 10 mg, 10 mg, Rectal, Daily PRN    fleet enema (Fleet) 7-19 GM/118ML rectal enema 133 mL, 1 enema, Rectal, Once PRN    amitriptyline (Elavil) tab 25 mg, 25 mg, Oral, Nightly    carvedilol (Coreg) tab 25 mg, 25 mg, Oral, BID    sacubitril-valsartan (Entresto) 24-26 MG per tab 1 tablet, 1 tablet, Oral, BID    ferrous sulfate DR tab 325 mg, 325 mg, Oral, Daily with breakfast    folic acid (Folvite) tab 800 mcg, 800 mcg, Oral, Daily    HYDROcodone-acetaminophen (Norco)  MG per tab 1 tablet, 1 tablet, Oral, Q6H PRN    pantoprazole (Protonix) DR tab 40 mg, 40 mg, Oral, BID AC    alendronate (Fosamax) tab 70 mg, 70 mg, Oral, Weekly    Objective     Vital Signs:  Blood pressure 112/64, pulse 60, temperature 98.1 °F (36.7 °C), temperature source Oral, resp. rate 16, weight 147 lb 14.4 oz (67.1 kg), SpO2 97%.  Body mass index is 24.61 kg/m².  Present Level of pain: 0/10  Non-verbal signs of pain present: No    Physical Exam:  General: Alert and in no apparent distress. Weak appearing  HEENT: No focal deficits.  Cardiac: Regular rate and rhythm, S1, S2 normal, no murmur, rub or gallop.  Lungs: Clear without wheezes, rales, rhonchi or dullness.  Normal excursions and effort.  Abdomen: Soft, non-tender, normal bowel sounds X 4 quadrants, no rebound or guarding  Extremities: Without clubbing, cyanosis. Peripheral pulses are 2+. BLE Edema not present, severe hand deformities r/t RA  Neurologic: Alert and oriented X4, normal affect.  Skin: Warm and dry.    Prior to admission Palliative performance scale PPSv2 (%): 60    Current PPS 40%    Hematology:  Lab Results   Component Value Date    WBC 4.5 02/09/2024    HGB 7.7 (L) 02/09/2024    HCT 22.7 (L) 02/09/2024    PLT 26.0 (L) 02/09/2024       Coags:  Lab  Results   Component Value Date    INR 1.60 (H) 01/15/2024    PTT 42.1 (H) 01/15/2024       Chemistry:  Lab Results   Component Value Date    CREATSERUM 1.11 (H) 02/09/2024    BUN 17 02/09/2024     02/09/2024    K 3.7 02/09/2024    K 3.7 02/09/2024     02/09/2024    CO2 25.0 02/09/2024    GLU 84 02/09/2024    CA 8.4 (L) 02/09/2024    ALB 3.2 01/19/2024    ALKPHO 110 01/13/2024    BILT 1.6 (H) 01/13/2024    TP 5.6 (L) 01/13/2024    AST 38 (H) 01/13/2024    ALT 15 01/13/2024    MG 2.0 02/07/2024    PHOS 2.7 02/09/2024       Imaging:  US VENOUS DOPPLER ARM LEFT - DIAG IMG (CPT=93971)    Result Date: 2/8/2024  CONCLUSION:  1. 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  Patient's nurse was notified of the findings by ultrasound technologist.    Dictated by (CST): Jesus Jackson MD on 2/08/2024 at 1:26 PM     Finalized by (CST): Jesus Jackson MD on 2/08/2024 at 1:31 PM           Follow up GOC Discussion    2/9/24-I met with pt and her family including dtr Barbi and  Low. I provided clinical overview. I also reinforced benefits of palliative care. I discussed the normal disease trajectory of CHF, concerns with ongoing pancytopenia/thrombocytopenia, DVT off A/C d/t thrombocytopenia, recent GIB, AFib with recommendation for w/u for watchman as outpt. She understands she will be at risk for recurrent hospitalizations and would be ok with being re-hospitalized again. Pt wants to continue with supportive care and we discussed plan for [facility] on dc. I recommended having a community palliative care program follow on dc which they seem open to. Discussed ongoing GOC discussions will be needed over time.     I also readdressed the importance of ACP prior to crisis. Pt was agreeable to talking about advance directives today. I reviewed HCPOA paperwork with pt in detail. She expressed wanting her dtr Barbi to be to be her primary HCPOA and  as secondary POA. OVIOA  paperwork completed with pt. I also addressed her full code status and discussed the risks vs benefits of life sustaining treatments in the setting of her clinical picture and encouraged setting limits. Pt expressed wanting to remain full code. She tells us she would not want prolonged heroic measures taken or if she was in vegetative state, but would want resuscitation attempted. I provided blank POLST form for their reference for the future.     Provided emotional support to pt/family who are coping adequately.       Discussed with Patient and Family: Yes  Patient's preference about sharing medical information: speak to pt and family  Patient's decision making preferences: speak to Pt  Code status: Full code  Have advanced directives been discussed with patient or healthcare power of : Yes        Healthcare Agent Appointed: Yes  Healthcare Agent's Name: Nicole Silverio  Healthcare Agent's Phone Number: [phone]          Spiritual needs addressed: Patient/family declined Spiritual Care    Palliative disposition: Community Palliative Care        Palliative Care Assessment and Recommendations     Problem List:     Acute on chronic combined CHF  Severe TVR  Bioprosthetic mitral valve  L subclavian DVT  Pacemaker/AICD  Pancytopenia/worsening thrombocytopenia  Pafib  Recent GIB  CKD  Rheumatoid arthritis  Weakness     Chronic pain r/t RA  -Controlled  -Continue home Norco 10/325 mg 1 tab po Q 6 hrs prn severe pain  -Continue alternating with Tramadol 50 mg po Q 6 hrs prn mod pain     Constipation  -Likely opioid induced, no BM in 7 days  -Increase Senna S 2 tabs BID.  -Continue Miralax prn and ducolax supp prn  -Education provided on adequately bowel regimen while taking opioids.      Goals of care counseling  -see above for details  -Confirmed wishes for full code and continue supportive care. She is not ready to set limits at this time.   -Ongoing GOC discussions will be needed over time.  -Agreeable to  palliative care following  -Dispo:  [facility] and recommended community palliative care program to follow on dc. SW to help with dc planning.   -Provided emotional support to pt/family who are coping adequately.     Advance care planning  -see above for details. I spent 20 mins today with ACP discussion.  -Pt's dtr Barbi Silverio is primary HCPOA #[phone].  -Completed HCPOA paperwork today.  -Provided copy of [place] form for review for future.      Palliative Performance Scale 40%     Emotion support provided to patient/family today: Yes       A total of 45 mins were spent on this consult, which included all of the following: direct face to face contact, history taking, physical examination, and >50% was spent counseling and coordinating care.    Discussed today's visit with message to BRADEN Holguin and Regina GIL.    I will sign off as GOC are established. Please call [phone] if needed again.     Aparna Mahan, ANP-BC, [facility] [id number]  2/9/2024  12:50 PM  Palliative Care Services

## 2024-02-09 NOTE — CM/SW NOTE
Community palliative referral received. Being processed for acceptance. Residential Liaison to update on status accordingly.  Thank you for the privilege.  Kaur Castillo  [facility] Liaison  [phone]    3p-Referral processed. [facility] can accept. Residential Liaison to reach out to family to discuss community palliative services and role of [facility] Palliative APN on care team. Thank you for the privilege.  Kaur Castillo  [facility] Liaison  [phone]    2/12/24-2:40p-Outbound call and subsequent voice message left for POA daughterBarbi.Call back number provided.

## 2024-02-09 NOTE — PROGRESS NOTES
Hematology Oncology Progress Note    Patient Name: Margaret Silverio   YOB: 1945   Medical Record Number: [id number]   CSN: [id number]   Attending Physician: Briana Snyder MD     Subjective:  No acute events.   S/p 2 platelets overnight with poor response.   Still c/o left neck and arm pain.   Leg swelling improving. No dyspnea. Comfortable on RA  No bleeding issues reported.   Seen by palliative care    Objective:  Vitals:  Vitals:    02/09/24 0503 02/09/24 0936 02/09/24 0959 02/09/24 1236   BP: 117/66 115/60 125/68 112/64   BP Location: Right arm Right arm Right arm Right arm   Pulse: 60 60 60 60   Resp: 18  16 16   Temp: 97.6 °F (36.4 °C)  98 °F (36.7 °C) 98.1 °F (36.7 °C)   TempSrc: Oral  Oral Oral   SpO2: 95%  98% 97%   Weight: 67.1 kg (147 lb 14.4 oz)          Current Medications:    Current Facility-Administered Medications:     amiodarone (Pacerone) tab 200 mg, 200 mg, Oral, Daily    spironolactone (Aldactone) tab 25 mg, 25 mg, Oral, Daily    senna-docusate (Senokot-S) 8.6-50 MG per tab 2 tablet, 2 tablet, Oral, BID    polyethylene glycol (PEG 3350) (Miralax) 17 g oral packet 17 g, 17 g, Oral, Once    furosemide (Lasix) tab 40 mg, 40 mg, Oral, Daily    sodium chloride 0.9% infusion, , Intravenous, Once    dexamethasone (Decadron) tab 40 mg, 40 mg, Oral, Daily with breakfast    traMADol (Ultram) tab 50 mg, 50 mg, Oral, Q6H PRN    polyethylene glycol (PEG 3350) (Miralax) 17 g oral packet 17 g, 17 g, Oral, Daily PRN    magnesium hydroxide (Milk of Magnesia) 400 MG/5ML oral suspension 30 mL, 30 mL, Oral, Daily PRN    bisacodyl (Dulcolax) 10 MG rectal suppository 10 mg, 10 mg, Rectal, Daily PRN    fleet enema (Fleet) 7-19 GM/118ML rectal enema 133 mL, 1 enema, Rectal, Once PRN    amitriptyline (Elavil) tab 25 mg, 25 mg, Oral, Nightly    carvedilol (Coreg) tab 25 mg, 25 mg, Oral, BID    sacubitril-valsartan (Entresto) 24-26 MG per tab 1 tablet, 1 tablet, Oral, BID    ferrous sulfate DR tab 325 mg,  325 mg, Oral, Daily with breakfast    folic acid (Folvite) tab 800 mcg, 800 mcg, Oral, Daily    HYDROcodone-acetaminophen (Norco)  MG per tab 1 tablet, 1 tablet, Oral, Q6H PRN    pantoprazole (Protonix) DR tab 40 mg, 40 mg, Oral, BID AC    alendronate (Fosamax) tab 70 mg, 70 mg, Oral, Weekly    Physical Examination:  General: Lethargic, frail, oriented.   Chest: Clear to auscultation.  Heart: Irregular. + ICD in place.   Abdomen: Soft, non tender  Extremities: Pitting edema bilateral LE edema. +LUE edema  Neurological: Grossly intact.     Labs:  Recent Labs   Lab 02/07/24  0712 02/08/24  0705 02/08/24  1501 02/09/24  0501   RBC 2.75* 2.78* 2.82* 2.45*   HGB 8.6* 8.5* 8.4* 7.7*   HCT 25.9* 25.8* 25.6* 22.7*   MCV 94.2 92.8 90.8 92.7   MCH 31.3 30.6 29.8 31.4   MCHC 33.2 32.9 32.8 33.9   RDW 19.5* 18.9* 18.7* 19.7*   NEPRELIM 1.73 2.12  --  3.87   WBC 2.5* 3.3* 3.7* 4.5   PLT 32.0* 28.0* 18.0* 26.0*     Recent Labs   Lab 02/07/24  0712 02/08/24  0705 02/09/24  0501   GLU 88 87 84   BUN 15 16 17   CREATSERUM 1.06* 1.11* 1.11*   EGFRCR 54* 51* 51*   CA 8.2* 8.5* 8.4*   ALB  --   --  3.0*    138 140   K 4.1 3.8 3.7  3.7    105 106   CO2 24.0 25.0 25.0   ALKPHO  --   --  153*   AST  --   --  21   ALT  --   --  11   BILT  --   --  1.9*   TP  --   --  6.0      No results for input(s): \"PT\", \"INR\", \"PTT\" in the last 168 hours.     Radiology:  No results found.     Impression and Plan:  78 year old AAF with acute on chronic CHF, chronic anemia, hematology consulted for worsening pancytopenia similar to recent admission:       Thrombocytopenia  - severe acute thrombocytopenia, with normal count at baseline  - probably consumptive in the setting of acute heart failure, acute thrombosis vs immune thrombocytopenia  - peripheral smear with no clumps or schistocytes. No heparin exposure. No new medications. Labs not consistent with hemolysis or DIC.  - s/p 2 units plt overnight with poor response.  - continue  steroids empirically for possible immune thrombocytopenia; dex 40 mg daily x4 days. Received #1/4 today   - may consider IVIG if no response within 24-48 hrs, but concern for volume with CHF  - monitor CBC q12h and transfuse as needed to keep plt > 20,000 or > 50,000 if actively bleeding.   - eliquis remains on hold    Left subclavian DVT  - nonocclusive DVT along the margin of ICD lead in the left subclavian vein  - may resume anticoagulation if plt count improves > 50 and safe from GI standpoint.  - pt was on chronic AC (eliquis) for A fib, stopped due to GIB     Acute on chronic anemia   - chronic multifactorial anemia; anemia of chronic disease, renal failure, drug induced (methotrexate), recent GI blood loss in the setting of anticoagulation  - recent EGD/CLN 1/17 small transverse colon AVM s/p APC likely was the source of bleeding.   - prior BMBx was negative per patient- done in [place] few years ago. no records available.   - recent work up showed no evidence of hemolysis or nutritional deficiency.   - currently no sings or symptoms of active bleeding. Hgb relatively stable.   - consider watchman's given h/o GIB and high bleeding risk with anticoagulation.   - transfuse as needed to keep hgb => 8 due to cardiac disease.     Leukopenia  - mainly lymphopenia, likely drug induced vs autoimmune. Stable.   - may need repeat BMBx if cytopenias persist after her acute issues resolve      Rheumatoid arthritis   - on methotrexate > 20 yrs and humira added 2021     Acute on chronic CHF:  - management per cardiology, on bumex, entresto, coreg      Will continue to follow. Dr. Crane will be covering over the weekend.       Briana Snyder MD   [facility]

## 2024-02-09 NOTE — PHYSICAL THERAPY NOTE
PHYSICAL THERAPY TREATMENT NOTE - INPATIENT     Room Number: 332/332-A       Presenting Problem: acute on chronic CHF, weakness and shortness of breath. Fell out of bed.  Co-Morbidities : pacemaker; RA    Problem List  Principal Problem:    Acute on chronic congestive heart failure, unspecified heart failure type (HCC)  Active Problems:    Pancytopenia (HCC)    Goals of care, counseling/discussion    Advance care planning    Palliative care by specialist    Acute deep vein thrombosis (DVT) of left upper extremity (HCC)      PHYSICAL THERAPY ASSESSMENT   Patient demonstrates limited progress this session, goals  remain in progress.    Patient continues to function below baseline with bed mobility, transfers, gait, and stair negotiation.  Contributing factors to remaining limitations include decreased functional strength, decreased endurance/aerobic capacity, decreased muscular endurance, and medical status.  Next session anticipate patient to progress bed mobility, transfers, gait, and stair negotiation.  Physical Therapy will continue to follow patient for duration of hospitalization.    Patient continues to benefit from continued skilled PT services: to promote return to prior level of function and safety with continuous assistance and gradual rehabilitative therapy .    PLAN  PT Treatment Plan: Bed mobility;Body mechanics;Coordination;Endurance;Patient education;Gait training;Strengthening;Transfer training;Balance training  Frequency (Obs): 3-5x/week    SUBJECTIVE  Pt was agreeable to therapy session.         OBJECTIVE  Precautions: Bed/chair alarm    WEIGHT BEARING RESTRICTION                PAIN ASSESSMENT   Rating:  (did not rate)  Location: L UE  Management Techniques: Activity promotion;Body mechanics;Relaxation;Repositioning    BALANCE  Static Sitting: Fair  Dynamic Sitting: Fair -  Static Standing: Poor -  Dynamic Standing: Poor -    ACTIVITY TOLERANCE                          O2 WALK       AM-PAC  '6-Clicks' INPATIENT SHORT FORM - BASIC MOBILITY  How much difficulty does the patient currently have...  Patient Difficulty: Turning over in bed (including adjusting bedclothes, sheets and blankets)?: A Lot   Patient Difficulty: Sitting down on and standing up from a chair with arms (e.g., wheelchair, bedside commode, etc.): A Lot   Patient Difficulty: Moving from lying on back to sitting on the side of the bed?: A Lot   How much help from another person does the patient currently need...   Help from Another: Moving to and from a bed to a chair (including a wheelchair)?: A Lot   Help from Another: Need to walk in hospital room?: A Lot   Help from Another: Climbing 3-5 steps with a railing?: Total     AM-PAC Score:  Raw Score: 11   Approx Degree of Impairment: 72.57%   Standardized Score (AM-PAC Scale): 33.86   CMS Modifier (G-Code): CL    FUNCTIONAL ABILITY STATUS  Functional Mobility/Gait Assessment  Gait Assistance: Maximum assistance (x2)  Distance (ft): SPT  Assistive Device: None (holding onto therapist)  Pattern: Shuffle  Rolling: maximum assist x2  Supine to Sit: maximum assist x2  Sit to Supine: maximum assist x2  Sit to Stand: maximum assist x2     Additional information: Pt is received in the bed and was cleared for therapy session. Co treat session with JAVIER Gonzalez. Pt is max A x2 with bed mobility and to transfer to the EOB. Pt was also max A x2 with rolling to her sides. Pt required assist with her B LE's and her upper trunk to sit up. Pt sat EOB for a few minutes and denied any dizziness and light headedness. Pt is then max A x2 with sit<>stand transfers while holding onto the therapist. Pt performed SPT from the bed to the chair also holding therapist and max A x2. Pt repositioned and left in the chair with all needs within reach. Reported to the RN on the status of the pt.     The patient's Approx Degree of Impairment: 72.57% has been calculated based on documentation in the [facility] '6 clicks' Inpatient  Daily Activity Short Form.  Research supports that patients with this level of impairment may benefit from Rehab.  Final disposition will be made by interdisciplinary medical team.      Patient End of Session: Up in chair;Needs met;Call light within reach;RN aware of session/findings;All patient questions and concerns addressed;Alarm set    CURRENT GOALS   Goals to be met by: 2/17/24  Patient Goal Patient's self-stated goal is: return to PLOF   Goal #1 Patient is able to demonstrate supine - sit EOB @ level: CGA      Goal #1   Current Status Max A x2   Goal #2 Patient is able to demonstrate transfers EOB to/from Chair/Wheelchair at assistance level: minimum assistance with walker - rolling      Goal #2  Current Status  Max A x2  holding onto therapist   Goal #3 Patient is able to ambulate 25 feet with assist device: walker - rolling at assistance level: minimum assistance   Goal #3   Current Status  SPT max A x2 holding therapist   Goal #4 Patient to demonstrate independence with home activity/exercise instructions provided to patient in preparation for discharge.   Goal #4   Current Status In progress        Therapeutic Activity: 30 minutes

## 2024-02-09 NOTE — CM/SW NOTE
Social work received a consult to initiate a CPC referral.    Social work sent the [facility] referral to [facility] in [facility] and notified the palliative liaison.    Social work will follow up.    SW/CM to remain available for support and/or discharge planning.     Lin Borges MSW, LSW  Discharge Planner [phone]

## 2024-02-09 NOTE — PROGRESS NOTES
[facility]    Progress Note    Margaret Silverio Patient Status:  Inpatient    3/14/1945 MRN [id number]   Location [facility] 3W/SW Attending Rosalinda Barry DO   Hosp Day # 6 PCP Johnathon Rodriguez DO     Chief complaint chf     Subjective:   Margaret Silverio is a(n) 78 year old female Pt asleep in the chair. Ate breakfast, skipped lunch. Still with neck pain but daughter reports it to be chronic for 2 years since she had surgery on it 2 ya.     ROS:   No cp, sob   No c/d   No n/v     Objective:   Blood pressure 112/64, pulse 60, temperature 98.1 °F (36.7 °C), temperature source Oral, resp. rate 16, weight 147 lb 14.4 oz (67.1 kg), SpO2 97%.      Intake/Output Summary (Last 24 hours) at 2024 1653  Last data filed at 2024 0959  Gross per 24 hour   Intake 726.67 ml   Output 200 ml   Net 526.67 ml       Patient Weight(s) for the past 336 hrs:   Weight   24 0503 147 lb 14.4 oz (67.1 kg)   24 0434 149 lb (67.6 kg)   24 0457 149 lb 8 oz (67.8 kg)   24 1108 150 lb 14.4 oz (68.4 kg)   24 0400 149 lb 6.4 oz (67.8 kg)   24 0635 148 lb 14.4 oz (67.5 kg)   24 0453 149 lb 9.6 oz (67.9 kg)   24 0032 149 lb 14.4 oz (68 kg)   24 2053 135 lb (61.2 kg)           General appearance: alert, appears stated age and cooperative  Pulmonary:  clear to auscultation bilaterally  Cardiovascular: S1, S2 normal, no murmur, click, rub or gallop, regular rate and rhythm  Abdominal: soft, non-tender; bowel sounds normal; no masses,  no organomegaly  Extremities: extremities normal, atraumatic, no cyanosis or edema        Medicines:           Lab Results   Component Value Date    WBC 3.2 (L) 2024    HGB 8.3 (L) 2024    HCT 25.0 (L) 2024    PLT 15.0 (LL) 2024    CREATSERUM 1.11 (H) 2024    BUN 17 2024     2024    K 3.7 2024    K 3.7 2024     2024    CO2 25.0 2024    GLU 84  02/09/2024    CA 8.4 (L) 02/09/2024    ALB 3.0 (L) 02/09/2024    ALKPHO 153 (H) 02/09/2024    BILT 1.9 (H) 02/09/2024    TP 6.0 02/09/2024    AST 21 02/09/2024    ALT 11 02/09/2024    PTT 35.2 02/09/2024    INR 1.25 (H) 02/09/2024    T4F 1.2 02/03/2024    TSH 10.681 (H) 02/03/2024    LIP 32 01/13/2024    MG 2.0 02/07/2024    PHOS 2.7 02/09/2024    B12 >2,000 (H) 02/03/2024       US VENOUS DOPPLER ARM LEFT - DIAG IMG (CPT=93971)    Result Date: 2/8/2024  CONCLUSION:  1. 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  Patient's nurse was notified of the findings by ultrasound technologist.    Dictated by (CST): Jesus Jackson MD on 2/08/2024 at 1:26 PM     Finalized by (CST): Jesus Jackson MD on 2/08/2024 at 1:31 PM               Results:     CBC:    Lab Results   Component Value Date    WBC 3.2 (L) 02/09/2024    WBC 4.5 02/09/2024    WBC 3.7 (L) 02/08/2024     Lab Results   Component Value Date    HGB 8.3 (L) 02/09/2024    HGB 7.7 (L) 02/09/2024    HGB 8.4 (L) 02/08/2024      Lab Results   Component Value Date    PLT 15.0 (LL) 02/09/2024    PLT 26.0 (L) 02/09/2024    PLT 18.0 (LL) 02/08/2024       Recent Labs   Lab 02/07/24  0712 02/08/24  0705 02/09/24  0501   GLU 88 87 84   BUN 15 16 17   CREATSERUM 1.06* 1.11* 1.11*   CA 8.2* 8.5* 8.4*    138 140   K 4.1 3.8 3.7  3.7    105 106   CO2 24.0 25.0 25.0             Assessment and Plan:           Assessment & Plan:   Acute on chronic combined CHF  Severe tricuspid valve regurgitation  Bioprosthetic mitral valve  NICM status post ICD in September 2023--> US with nonocclusive clot on icd lead in subclavian vein. Cards aware   Recent echo showed ejection fraction 37% with grade 4 tricuspid regurg  Cardiology is now on consult  Lasix given in the emergency room and started on Bumex 1 mg twice daily diuresis as per cards; legs swollen; cxr no chf  Continue Entresto and carvedilol  Sp pacer interrogation   Diuresing well with bumex    Holding doac due to recent GI bleed, may benefit from watchman in near future , although pt has refused in the past        2. Pancytopenia/ worsening severe acute TCP:  -had similar issue on last admission Dr Snyder was consulted; thought to be consumptive in seeing of acute illness vs drug induced at the time. No h/o heparin use   - apprec Hem input - platelets now 16 and will get 2 units and prednisone started for possible immune process ; hemolysis labs consistent with this   - hb stable. Recent gi bleed      3. Paroxysmal atrial fibrillation   Hold oral anticoagulation given recent GI bleed  Currently rate controlled  Still considering outpatient Watchman procedure given her recurrent GI bleeds     4. Chronic kidney injury stage III: stable  Baseline creatinine is about 1.3  Current creatinine 1.34  Follow urine output              Perceived  acute hypoxic respiratory failure likely 2/2 CHF    - unusual discoid atelectasis on cxr; elevated procalc   -SARS/Influenza, RSV neg 2/2/24  - no evidence of pna   - on room air      DVT ppx scds               Rosalinda Barry, DO         Chart reviewed, including current vitals, notes, labs and imaging  Labs ordered and medications adjusted as outlined above  Coordinate care with care team/consultants  Discussed with patient results of tests, management plan as outlined above, and the need for ongoing hospitalization  D/w RN     MDM high        Dispo: To rehab when platelets are stable

## 2024-02-09 NOTE — OCCUPATIONAL THERAPY NOTE
OCCUPATIONAL THERAPY TREATMENT NOTE - INPATIENT        Room Number: 332/332-A     Presenting Problem: acute on chronic CHF    Problem List  Principal Problem:    Acute on chronic congestive heart failure, unspecified heart failure type (HCC)  Active Problems:    Pancytopenia (HCC)    Goals of care, counseling/discussion    Advance care planning    Palliative care by specialist    Acute deep vein thrombosis (DVT) of left upper extremity (HCC)      OCCUPATIONAL THERAPY ASSESSMENT   Patient demonstrates fair progress this session, goals remain in progress.    Patient continues to function below baseline with  ADLs and fx mobility/transfers .   Contributing factors to remaining limitations include decreased functional strength, decreased functional reach, decreased endurance, pain, and impaired balance.  Next session anticipate patient to progress transfers and stating sitting balance to facilitate participation with UB ADLs.  Occupational Therapy will continue to follow patient for duration of hospitalization.    Patient continues to benefit from continued skilled OT services: to promote return to prior level of function and safety with continuous assistance and gradual rehabilitative therapy .     PLAN  OT Treatment Plan: Balance activities;Energy conservation/work simplification techniques;ADL training;Functional transfer training;UE strengthening/ROM;Endurance training;Patient/Family education;Patient/Family training;Equipment eval/education;Compensatory technique education  OT Device Recommendations: Shower chair; Raised toilet seat (built up utensils)    SUBJECTIVE  \"I feel weak.\"    OBJECTIVE  Precautions: Bed/chair alarm    WEIGHT BEARING RESTRICTION  None    PAIN ASSESSMENT  Rating: -- (not rated)  Location: L UE  Management Techniques: Activity promotion; Repositioning; Nurse notified    ACTIVITY TOLERANCE  Pulse: 60  Heart Rate Source: Monitor     BP: 115/60  BP Location: Right arm  BP Method:  Automatic  Patient Position: Sitting    O2 SATURATIONS  Oxygen Therapy  SPO2% Ambulation on Room Air: 100 (with ax NOT with ambulation)    ACTIVITIES OF DAILY LIVING ASSESSMENT  AM-PAC ‘6-Clicks’ Inpatient Daily Activity Short Form  How much help from another person does the patient currently need…  -   Putting on and taking off regular lower body clothing?: Total  -   Bathing (including washing, rinsing, drying)?: A Lot  -   Toileting, which includes using toilet, bedpan or urinal? : Total  -   Putting on and taking off regular upper body clothing?: A Lot  -   Taking care of personal grooming such as brushing teeth?: A Lot  -   Eating meals?: A Lot    AM-PAC Score:  Score: 10  Approx Degree of Impairment: 74.7%  Standardized Score (AM-PAC Scale): 27.31  CMS Modifier (G-Code): CL    BED MOBILITY  Rolling: Max assist x2 to facilitate donning of brief at bed-level  Supine to Sit: Max assist x2  Comments: Required min assist for static sitting balance.    FUNCTIONAL TRANSFER ASSESSMENT  Stand Pivot Transfer from EOB to Chair Without a Device: Max assist x2    FUNCTIONAL MOBILITY  Unable to safely progress mobility at this time.    FUNCTIONAL ADL ASSESSMENT  Eating: min assist  LB Dressing: total assist for donning brief/socks at bed-level  Comments:  Limited B UE AROM s/t arthritic pain    EDUCATION PROVIDED  Patient: Plan of Care; Role of Occupational Therapy; Discharge Recommendations; Functional Transfer Techniques; Fall Prevention; Posture/Positioning; Proper Body Mechanics  Patient's Response to Education: Verbalized Understanding; Returned Demonstration; Requires Further Education    The patient's Approx Degree of Impairment: 74.7% has been calculated based on documentation in the [facility] '6 clicks' Inpatient Daily Activity Short Form.  Research supports that patients with this level of impairment may benefit from rehab.  Final disposition will be made by interdisciplinary medical team.    Patient End of Session:  Up in chair;Call light within reach;Needs met;RN aware of session/findings;All patient questions and concerns addressed;Alarm set    OT Goals:  Patient's self stated goal is: did not state      Patient will complete functional transfer with min a   Comment: ongoing    Patient will complete toileting with mod a   Comment: ongoing    Patient will tolerate standing for 1-2 minutes in prep for adls with min a   Comment: ongoing          Goals  on:   Frequency: 3x week    OT Session Time  Therapeutic Activity: 23 minutes    MAKSIM Romo/L  [facility]  #[id number]

## 2024-02-09 NOTE — PLAN OF CARE
Patient is alert and oriented times four. Patient is a max assist. Patient is on Decadron oral. Patient started on oral amiodarone and spironolactone today. Patient received Norco for pain and Miralax for constipation. Patient had a bowel movement (see mar). Patient platelet count was 15 thousand, hematology informed by rn, 2 units of platelets ordered per hematology and administered by RN. All safety measures are in place and call light is within reach.      Problem: Patient Centered Care  Goal: Patient preferences are identified and integrated in the patient's plan of care  Description: Interventions:  - What would you like us to know as we care for you? I live with my  and two grandchildren  - Provide timely, complete, and accurate information to patient/family  - Incorporate patient and family knowledge, values, beliefs, and cultural backgrounds into the planning and delivery of care  - Encourage patient/family to participate in care and decision-making at the level they choose  - Honor patient and family perspectives and choices  Outcome: Progressing     Problem: Patient/Family Goals  Goal: Patient/Family Long Term Goal  Description: Patient's Long Term Goal: To get better    Interventions:  - IV diuretics, prn O2 supplementation, cardiac monitoring and electrolyte protocol  - See additional Care Plan goals for specific interventions  Outcome: Progressing  Goal: Patient/Family Short Term Goal  Description: Patient's Short Term Goal: To breathe better    Interventions:   - IV diuretics, O2 supplementation prn, cardiac monitoring, electrolyte protocol  - See additional Care Plan goals for specific interventions  Outcome: Progressing     Problem: CARDIOVASCULAR - ADULT  Goal: Maintains optimal cardiac output and hemodynamic stability  Description: INTERVENTIONS:  - Monitor vital signs, rhythm, and trends  - Monitor for bleeding, hypotension and signs of decreased cardiac output  - Evaluate effectiveness of  vasoactive medications to optimize hemodynamic stability  - Monitor arterial and/or venous puncture sites for bleeding and/or hematoma  - Assess quality of pulses, skin color and temperature  - Assess for signs of decreased coronary artery perfusion - ex. Angina  - Evaluate fluid balance, assess for edema, trend weights  Outcome: Progressing  Goal: Absence of cardiac arrhythmias or at baseline  Description: INTERVENTIONS:  - Continuous cardiac monitoring, monitor vital signs, obtain 12 lead EKG if indicated  - Evaluate effectiveness of antiarrhythmic and heart rate control medications as ordered  - Initiate emergency measures for life threatening arrhythmias  - Monitor electrolytes and administer replacement therapy as ordered  Outcome: Progressing     Problem: RESPIRATORY - ADULT  Goal: Achieves optimal ventilation and oxygenation  Description: INTERVENTIONS:  - Assess for changes in respiratory status  - Assess for changes in mentation and behavior  - Position to facilitate oxygenation and minimize respiratory effort  - Oxygen supplementation based on oxygen saturation or ABGs  - Provide Smoking Cessation handout, if applicable  - Encourage broncho-pulmonary hygiene including cough, deep breathe, Incentive Spirometry  - Assess the need for suctioning and perform as needed  - Assess and instruct to report SOB or any respiratory difficulty  - Respiratory Therapy support as indicated  - Manage/alleviate anxiety  - Monitor for signs/symptoms of CO2 retention  Outcome: Progressing     Problem: GENITOURINARY - ADULT  Goal: Absence of urinary retention  Description: INTERVENTIONS:  - Assess patient’s ability to void and empty bladder  - Monitor intake/output and perform bladder scan as needed  - Follow urinary retention protocol/standard of care  - Consider collaborating with pharmacy to review patient's medication profile  - Implement strategies to promote bladder emptying  Outcome: Progressing     Problem: Impaired  Functional Mobility  Goal: Achieve highest/safest level of mobility/gait  Description: Interventions:  - Assess patient's functional ability and stability  - Promote increasing activity/tolerance for mobility and gait  - Educate and engage patient/family in tolerated activity level and precautions    Outcome: Progressing

## 2024-02-09 NOTE — PLAN OF CARE
PRN Norco for severe arthritis pain. 2u platelets transfused, pt tolerated well. Plt 26 this AM.    Call light within reach, fall precautions in place  Problem: Patient Centered Care  Goal: Patient preferences are identified and integrated in the patient's plan of care  Description: Interventions:  - What would you like us to know as we care for you? I live with my  and two grandchildren  - Provide timely, complete, and accurate information to patient/family  - Incorporate patient and family knowledge, values, beliefs, and cultural backgrounds into the planning and delivery of care  - Encourage patient/family to participate in care and decision-making at the level they choose  - Honor patient and family perspectives and choices  Outcome: Progressing     Problem: CARDIOVASCULAR - ADULT  Goal: Maintains optimal cardiac output and hemodynamic stability  Description: INTERVENTIONS:  - Monitor vital signs, rhythm, and trends  - Monitor for bleeding, hypotension and signs of decreased cardiac output  - Evaluate effectiveness of vasoactive medications to optimize hemodynamic stability  - Monitor arterial and/or venous puncture sites for bleeding and/or hematoma  - Assess quality of pulses, skin color and temperature  - Assess for signs of decreased coronary artery perfusion - ex. Angina  - Evaluate fluid balance, assess for edema, trend weights  Outcome: Progressing  Goal: Absence of cardiac arrhythmias or at baseline  Description: INTERVENTIONS:  - Continuous cardiac monitoring, monitor vital signs, obtain 12 lead EKG if indicated  - Evaluate effectiveness of antiarrhythmic and heart rate control medications as ordered  - Initiate emergency measures for life threatening arrhythmias  - Monitor electrolytes and administer replacement therapy as ordered  Outcome: Progressing     Problem: RESPIRATORY - ADULT  Goal: Achieves optimal ventilation and oxygenation  Description: INTERVENTIONS:  - Assess for changes in  respiratory status  - Assess for changes in mentation and behavior  - Position to facilitate oxygenation and minimize respiratory effort  - Oxygen supplementation based on oxygen saturation or ABGs  - Provide Smoking Cessation handout, if applicable  - Encourage broncho-pulmonary hygiene including cough, deep breathe, Incentive Spirometry  - Assess the need for suctioning and perform as needed  - Assess and instruct to report SOB or any respiratory difficulty  - Respiratory Therapy support as indicated  - Manage/alleviate anxiety  - Monitor for signs/symptoms of CO2 retention  Outcome: Progressing     Problem: GENITOURINARY - ADULT  Goal: Absence of urinary retention  Description: INTERVENTIONS:  - Assess patient’s ability to void and empty bladder  - Monitor intake/output and perform bladder scan as needed  - Follow urinary retention protocol/standard of care  - Consider collaborating with pharmacy to review patient's medication profile  - Implement strategies to promote bladder emptying  Outcome: Progressing     Problem: Impaired Functional Mobility  Goal: Achieve highest/safest level of mobility/gait  Description: Interventions:  - Assess patient's functional ability and stability  - Promote increasing activity/tolerance for mobility and gait  - Educate and engage patient/family in tolerated activity level and precautions  - Recommend use of total lift for transfers  Outcome: Progressing

## 2024-02-10 ENCOUNTER — APPOINTMENT (OUTPATIENT)
Dept: GENERAL RADIOLOGY | Facility: HOSPITAL | Age: 79
End: 2024-02-10
Attending: HOSPITALIST
Payer: MEDICARE

## 2024-02-10 ENCOUNTER — APPOINTMENT (OUTPATIENT)
Dept: CV DIAGNOSTICS | Facility: HOSPITAL | Age: 79
End: 2024-02-10
Attending: NURSE PRACTITIONER
Payer: MEDICARE

## 2024-02-10 ENCOUNTER — APPOINTMENT (OUTPATIENT)
Dept: ULTRASOUND IMAGING | Facility: HOSPITAL | Age: 79
End: 2024-02-10
Attending: INTERNAL MEDICINE
Payer: MEDICARE

## 2024-02-10 LAB
ALBUMIN SERPL-MCNC: 3.7 G/DL (ref 3.2–4.8)
ALBUMIN/GLOB SERPL: 1.1 {RATIO} (ref 1–2)
ALP LIVER SERPL-CCNC: 181 U/L
ALT SERPL-CCNC: 16 U/L
ANION GAP SERPL CALC-SCNC: 14 MMOL/L (ref 0–18)
ANION GAP SERPL CALC-SCNC: 19 MMOL/L (ref 0–18)
AST SERPL-CCNC: 59 U/L (ref ?–34)
ATRIAL RATE: 72 BPM
BASE EXCESS BLD CALC-SCNC: -10 MMOL/L (ref ?–2)
BASE EXCESS BLD CALC-SCNC: -2.1 MMOL/L (ref ?–2)
BASOPHILS # BLD AUTO: 0 X10(3) UL (ref 0–0.2)
BASOPHILS # BLD AUTO: 0 X10(3) UL (ref 0–0.2)
BASOPHILS NFR BLD AUTO: 0 %
BASOPHILS NFR BLD AUTO: 0 %
BILIRUB SERPL-MCNC: 2.3 MG/DL (ref 0.2–1.1)
BLOOD GAS EPAP: 5 CM H2O
BLOOD GAS IPAP: 10 CM H2O
BLOOD TYPE BARCODE: 6200
BUN BLD-MCNC: 25 MG/DL (ref 9–23)
BUN BLD-MCNC: 25 MG/DL (ref 9–23)
BUN/CREAT SERPL: 15.1 (ref 10–20)
BUN/CREAT SERPL: 17 (ref 10–20)
CA-I BLD-SCNC: 1.06 MMOL/L (ref 0.95–1.32)
CALCIUM BLD-MCNC: 8.7 MG/DL (ref 8.7–10.4)
CALCIUM BLD-MCNC: 8.8 MG/DL (ref 8.7–10.4)
CHLORIDE SERPL-SCNC: 102 MMOL/L (ref 98–112)
CHLORIDE SERPL-SCNC: 103 MMOL/L (ref 98–112)
CHOLEST SERPL-MCNC: 134 MG/DL (ref ?–200)
CO2 SERPL-SCNC: 16 MMOL/L (ref 21–32)
CO2 SERPL-SCNC: 20 MMOL/L (ref 21–32)
COHGB MFR BLD: 1.8 % (ref 0–3)
CREAT BLD-MCNC: 1.47 MG/DL
CREAT BLD-MCNC: 1.66 MG/DL
DEPRECATED RDW RBC AUTO: 61 FL (ref 35.1–46.3)
DEPRECATED RDW RBC AUTO: 63.6 FL (ref 35.1–46.3)
EGFRCR SERPLBLD CKD-EPI 2021: 32 ML/MIN/1.73M2 (ref 60–?)
EGFRCR SERPLBLD CKD-EPI 2021: 37 ML/MIN/1.73M2 (ref 60–?)
EOSINOPHIL # BLD AUTO: 0 X10(3) UL (ref 0–0.7)
EOSINOPHIL # BLD AUTO: 0.01 X10(3) UL (ref 0–0.7)
EOSINOPHIL NFR BLD AUTO: 0 %
EOSINOPHIL NFR BLD AUTO: 0.3 %
ERYTHROCYTE [DISTWIDTH] IN BLOOD BY AUTOMATED COUNT: 20 % (ref 11–15)
ERYTHROCYTE [DISTWIDTH] IN BLOOD BY AUTOMATED COUNT: 20.2 % (ref 11–15)
FIBRINOGEN PPP-MCNC: 286 MG/DL (ref 180–480)
GLOBULIN PLAS-MCNC: 3.4 G/DL (ref 2.8–4.4)
GLUCOSE BLD-MCNC: 114 MG/DL (ref 70–99)
GLUCOSE BLD-MCNC: 90 MG/DL (ref 70–99)
GLUCOSE BLDC GLUCOMTR-MCNC: 117 MG/DL (ref 70–99)
GLUCOSE BLDC GLUCOMTR-MCNC: 124 MG/DL (ref 70–99)
HAPTOGLOB SERPL-MCNC: 41 MG/DL (ref 40–280)
HCO3 BLDA-SCNC: 17.2 MEQ/L (ref 21–27)
HCO3 BLDA-SCNC: 23.3 MEQ/L (ref 21–27)
HCT VFR BLD AUTO: 25.5 %
HCT VFR BLD AUTO: 26.9 %
HDLC SERPL-MCNC: 54 MG/DL (ref 40–59)
HGB BLD-MCNC: 8.1 G/DL
HGB BLD-MCNC: 8.1 G/DL
HGB BLD-MCNC: 8.6 G/DL
IMM GRANULOCYTES # BLD AUTO: 0.03 X10(3) UL (ref 0–1)
IMM GRANULOCYTES # BLD AUTO: 0.04 X10(3) UL (ref 0–1)
IMM GRANULOCYTES NFR BLD: 0.6 %
IMM GRANULOCYTES NFR BLD: 1 %
LACTATE BLD-SCNC: 10.6 MMOL/L (ref 0.5–2)
LACTATE BLD-SCNC: 4 MMOL/L (ref 0.5–2)
LACTATE SERPL-SCNC: 10.4 MMOL/L (ref 0.5–2)
LACTATE SERPL-SCNC: 4.2 MMOL/L (ref 0.5–2)
LACTATE SERPL-SCNC: 5.9 MMOL/L (ref 0.5–2)
LDH SERPL L TO P-CCNC: 571 U/L
LDLC SERPL CALC-MCNC: 63 MG/DL (ref ?–100)
LYMPHOCYTES # BLD AUTO: 0.38 X10(3) UL (ref 1–4)
LYMPHOCYTES # BLD AUTO: 0.56 X10(3) UL (ref 1–4)
LYMPHOCYTES NFR BLD AUTO: 10.8 %
LYMPHOCYTES NFR BLD AUTO: 9.9 %
MCH RBC QN AUTO: 30.5 PG (ref 26–34)
MCH RBC QN AUTO: 31.6 PG (ref 26–34)
MCHC RBC AUTO-ENTMCNC: 31.8 G/DL (ref 31–37)
MCHC RBC AUTO-ENTMCNC: 32 G/DL (ref 31–37)
MCV RBC AUTO: 95.9 FL
MCV RBC AUTO: 98.9 FL
METHGB MFR BLD: 0.9 % SAT (ref 0.4–1.5)
MODIFIED ALLEN TEST: POSITIVE
MONOCYTES # BLD AUTO: 0.12 X10(3) UL (ref 0.1–1)
MONOCYTES # BLD AUTO: 0.18 X10(3) UL (ref 0.1–1)
MONOCYTES NFR BLD AUTO: 3.1 %
MONOCYTES NFR BLD AUTO: 3.5 %
NEUTROPHILS # BLD AUTO: 3.29 X10 (3) UL (ref 1.5–7.7)
NEUTROPHILS # BLD AUTO: 3.29 X10(3) UL (ref 1.5–7.7)
NEUTROPHILS # BLD AUTO: 4.42 X10 (3) UL (ref 1.5–7.7)
NEUTROPHILS # BLD AUTO: 4.42 X10(3) UL (ref 1.5–7.7)
NEUTROPHILS NFR BLD AUTO: 85.1 %
NEUTROPHILS NFR BLD AUTO: 85.7 %
NONHDLC SERPL-MCNC: 80 MG/DL (ref ?–130)
O2 CT BLD-SCNC: 11.4 VOL% (ref 15–23)
O2 CT BLD-SCNC: 13.4 VOL% (ref 15–23)
O2/TOTAL GAS SETTING VFR VENT: 40 %
OSMOLALITY SERPL CALC.SUM OF ELEC: 288 MOSM/KG (ref 275–295)
OSMOLALITY SERPL CALC.SUM OF ELEC: 289 MOSM/KG (ref 275–295)
OXYGEN LITERS/MINUTE: 15 L/MIN
P-R INTERVAL: 246 MS
PCO2 BLDA: 28 MM HG (ref 35–45)
PCO2 BLDA: 34 MM HG (ref 35–45)
PH BLDA: 7.33 [PH] (ref 7.35–7.45)
PH BLDA: 7.42 [PH] (ref 7.35–7.45)
PLATELET # BLD AUTO: 18 10(3)UL (ref 150–450)
PLATELET # BLD AUTO: 23 10(3)UL (ref 150–450)
PO2 BLDA: 170 MM HG (ref 80–100)
PO2 BLDA: 324 MM HG (ref 80–100)
POTASSIUM BLD-SCNC: 5.3 MMOL/L (ref 3.6–5.1)
POTASSIUM SERPL-SCNC: 5.1 MMOL/L (ref 3.5–5.1)
POTASSIUM SERPL-SCNC: 5.1 MMOL/L (ref 3.5–5.1)
POTASSIUM SERPL-SCNC: 5.4 MMOL/L (ref 3.5–5.1)
PROT SERPL-MCNC: 7.1 G/DL (ref 5.7–8.2)
PUNCTURE CHARGE: YES
PUNCTURE CHARGE: YES
Q-T INTERVAL: 614 MS
QRS DURATION: 222 MS
QTC CALCULATION (BEZET): 779 MS
R AXIS: -53 DEGREES
RBC # BLD AUTO: 2.66 X10(6)UL
RBC # BLD AUTO: 2.72 X10(6)UL
SAO2 % BLDA: >99 % (ref 94–100)
SAO2 % BLDA: >99 % (ref 94–100)
SODIUM BLD-SCNC: 133 MMOL/L (ref 135–145)
SODIUM SERPL-SCNC: 137 MMOL/L (ref 136–145)
SODIUM SERPL-SCNC: 137 MMOL/L (ref 136–145)
T AXIS: 29 DEGREES
TRIGL SERPL-MCNC: 91 MG/DL (ref 30–149)
TROPONIN I SERPL HS-MCNC: 53 NG/L
UNIT VOLUME: 200 ML
VENTRICULAR RATE: 97 BPM
VLDLC SERPL CALC-MCNC: 14 MG/DL (ref 0–30)
WBC # BLD AUTO: 3.8 X10(3) UL (ref 4–11)
WBC # BLD AUTO: 5.2 X10(3) UL (ref 4–11)

## 2024-02-10 PROCEDURE — 93306 TTE W/DOPPLER COMPLETE: CPT | Performed by: NURSE PRACTITIONER

## 2024-02-10 PROCEDURE — 99233 SBSQ HOSP IP/OBS HIGH 50: CPT | Performed by: HOSPITALIST

## 2024-02-10 PROCEDURE — 99233 SBSQ HOSP IP/OBS HIGH 50: CPT | Performed by: INTERNAL MEDICINE

## 2024-02-10 PROCEDURE — 71045 X-RAY EXAM CHEST 1 VIEW: CPT | Performed by: HOSPITALIST

## 2024-02-10 PROCEDURE — 5A09357 ASSISTANCE WITH RESPIRATORY VENTILATION, LESS THAN 24 CONSECUTIVE HOURS, CONTINUOUS POSITIVE AIRWAY PRESSURE: ICD-10-PCS | Performed by: INTERNAL MEDICINE

## 2024-02-10 PROCEDURE — 99291 CRITICAL CARE FIRST HOUR: CPT | Performed by: INTERNAL MEDICINE

## 2024-02-10 PROCEDURE — 93970 EXTREMITY STUDY: CPT | Performed by: INTERNAL MEDICINE

## 2024-02-10 RX ORDER — MILRINONE LACTATE 0.2 MG/ML
0.25 INJECTION, SOLUTION INTRAVENOUS CONTINUOUS
Status: DISCONTINUED | OUTPATIENT
Start: 2024-02-10 | End: 2024-02-17

## 2024-02-10 RX ORDER — DOBUTAMINE HYDROCHLORIDE 200 MG/100ML
5 INJECTION INTRAVENOUS CONTINUOUS
Status: DISCONTINUED | OUTPATIENT
Start: 2024-02-10 | End: 2024-02-17

## 2024-02-10 NOTE — PROGRESS NOTES
Patient self report difficulty breathing. Patient presentation altered from morning vitals. Patient presented dyspneic and unable to form words, but continued to say \"help me I cannot breathe\". Patient Oxygen saturation was within normal limits. Patient continued to state she could not breathe. RRT called patient transferred to PCCU.

## 2024-02-10 NOTE — CONSULTS
[facility]    Consult Note    Date:  2/10/2024  Date of Admission:  2/2/2024    Chief Complaint:   Margaret Silverio is a(n) 78 year old female with tachypnea.    HPI:   The patient has a history of nonischemic cardiomyopathy status post coronary angiography in 2022.  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  The patient was admitted to the hospital previously with GI bleed found to have AVMs.  She is now admitted with acute on chronic congestive heart failure.  She is status post ICD.  Oral anticoagulation is being held for thrombocytopenia.  The patient has received a prosthetic mitral valve.  She has severe TR with pulmonary artery pressure estimated at 43 mmHg.  She is followed by [facility].  She was transferred down to the ICU and now with a rapid response as she was tachypneic.  She has a lactic acid of 10 without clear source.    History     Past Medical History:   Diagnosis Date    Anemia     Arrhythmia     Arthritis     Deep vein thrombosis (HCC)     Essential hypertension     High blood pressure     History of blood transfusion     Seizure disorder (HCC)     Seizures (HCC)      Past Surgical History:   Procedure Laterality Date    COLONOSCOPY N/A 01/17/2024    Dr. Rios    COLONOSCOPY N/A 1/17/2024    Procedure: COLONOSCOPY;  Surgeon: Zoraida Rios MD;  Location: [facility] ENDOSCOPY    EGD N/A 01/17/2024    Dr. Rios    OTHER SURGICAL HISTORY  2017    Heart Surgery     OTHER SURGICAL HISTORY  2020    Bilateral shoulder replacement      Social History:  Social History     Socioeconomic History    Marital status:    Tobacco Use    Smoking status: Former     Packs/day: .25     Types: Cigarettes     Quit date: 2014     Years since quitting: 10.1    Smokeless tobacco: Never   Vaping Use    Vaping Use: Never used   Substance and Sexual Activity    Alcohol use: Never    Drug use: Never     Social Determinants of Health      Financial Resource Strain: Low Risk  (1/23/2024)    Financial Resource Strain     Med Affordability: No   Food Insecurity: No Food Insecurity (2/3/2024)    Food Insecurity     Food Insecurity: Never true   Transportation Needs: No Transportation Needs (2/3/2024)    Transportation Needs     Lack of Transportation: No   Housing Stability: Low Risk  (2/3/2024)    Housing Stability     Housing Instability: No     Allergies/Medications:   Allergies:   Allergies   Allergen Reactions    Lisinopril Coughing     Medications Prior to Admission   Medication Sig    amiodarone 200 MG Oral Tab Take 1 tablet (200 mg total) by mouth daily.    AMITRIPTYLINE 25 MG Oral Tab TAKE 1 TABLET(25 MG) BY MOUTH EVERY NIGHT    pantoprazole 40 MG Oral Tab EC Take 1 tablet (40 mg total) by mouth 2 (two) times daily before meals.    furosemide 40 MG Oral Tab Take 1 tablet (40 mg total) by mouth daily.    HYDROcodone-acetaminophen (NORCO)  MG Oral Tab Take 1 tablet by mouth every 6 (six) hours as needed for Pain.    ENTRESTO 24-26 MG Oral Tab Take 1 tablet by mouth 2 (two) times daily.    ferrous sulfate 325 (65 FE) MG Oral Tab EC Take 1 tablet (325 mg total) by mouth daily with breakfast.    carvedilol 25 MG Oral Tab Take 1 tablet (25 mg total) by mouth 2 (two) times daily.    methotrexate 2.5 MG Oral Tab TAKE 6 TABLETS BY MOUTH 1 TIME A WEEK    alendronate 70 MG Oral Tab Take 1 tablet (70 mg total) by mouth once a week.       Review of Systems:   Review of Systems:  Vision normal. Ear nose and throat normal. Bowel normal. Bladder function normal. No depression. No thyroid disease. No lymphatic system concerns.  No rash. Muscles and joints unremarkable. No weight loss no weight gain.    Physical Exam:   Vital Signs:  Blood pressure 115/76, pulse 60, temperature 97.5 °F (36.4 °C), temperature source Oral, resp. rate 21, weight 147 lb 14.4 oz (67.1 kg), SpO2 100%.     black female on BiPAP  HEENT examination is unremarkable with pupils  equal round and reactive to light and accommodation.   Neck without adenopathy, thyromegaly, JVD nor bruit.   Lungs diminished at bases to auscultation and percussion.  Cardiac regular rate and rhythm 2 out of 6 systolic murmur.   Abdomen nontender, without hepatosplenomegaly and no mass appreciable.   Extremities without clubbing cyanosis nor edema.   Neurologic the patient follows commands with movement of extremities..  Skin without gross abnormality    Results:     Lab Results   Component Value Date    WBC 3.8 02/10/2024    HGB 8.1 02/10/2024    HCT 25.5 02/10/2024    PLT 23.0 02/10/2024    CREATSERUM 1.47 02/10/2024    BUN 25 02/10/2024     02/10/2024    K 5.1 02/10/2024    K 5.1 02/10/2024     02/10/2024    CO2 20.0 02/10/2024     02/10/2024    CA 8.8 02/10/2024    PTT 35.2 02/09/2024    INR 1.25 02/09/2024     Chest x-ray-cardiomegaly with chronic small bilateral pleural effusions with and mild edema    Arterial blood gas-pH 7.33, pCO2 28, pO2 324 on 15 L nonrebreather mask.    Assessment/Plan:   1.  Acute on chronic respiratory failure-the patient has a dramatic lactic acidosis of uncertain etiology.  I suspect organ hypoperfusion with cardiogenic shock.  There is no fever nor leukocytosis to implicate septic shock.  She has mild chronic anemia but without evidence of slick bleed, although she has prior history of AVMs.  There was nonocclusive thrombus at the subclavian vein associated with a pacemaker wire.  Her platelets are 23,000 making embolism less likely.  She is status post mitral valve replacement.  Discussed with cardiology.  Will initiate dobutamine and get stat echo.  Will leave on BiPAP for comfort although she is likely will do acceptably on less respiratory support.  I added meropenem to cover all bases; however, I do not think this is sepsis.    Recommendations:  1.  Dobutamine  2.  Stat echo  3.  Await cardiology reevaluation  4.  PAP therapy as needed  5.  We will follow  clinically  6.  Morning chest x-ray, CBC and electrolytes  7.  Repeat lactic acid in a couple hours with blood gas  8.  Will not intubate at this moment although if further worsening clinically, would reconsider.  9.  Will get lower extremity venous ultrasound stat  10.  Will not do CT for PE nor image abdomen for bowel ischemia at this moment as GFR is low but will contemplate in the short-term.  11.  Meropenem and await culture     2.  Thrombocytopenia-as per hematology    3.  Cardiomyopathy-as per cardiology    4.  DVT prophylaxis-SCDs temporarily in patient with thrombocytopenia.    I am delighted to assist with Margaret's care    Greater than 35 minutes critical care time    Aroldo Pacheco MD  Medical Director, Critical Care, [facility]  Medical Director, [facility]  Pager: [phone]

## 2024-02-10 NOTE — PROGRESS NOTES
[facility] Pharmacy Note:  Renal Adjustment for meropenem (MERREM)    Margaret Silverio is a 78 year old patient who has been prescribed meropenem (MERREM) 1000 mg every 8 hrs.  The estimated creatinine clearance is 28.8 mL/min (A) (based on SCr of 1.47 mg/dL (H)). The dose has been adjusted to meropenem (MERREM) 500 mg every 12 hrs per hospital renal dose adjustment protocol for treatment of sepsis.  Pharmacy will follow and adjust dose as warranted for additional renal function changes.    Thank you,    Tammie Rodas, PharmD  2/10/2024  2:03 PM

## 2024-02-10 NOTE — PLAN OF CARE
Bed locked in low position, belongings in reach, bed/chair alarms on, call light in reach. Frequent rounding done, all patient needs met. Non-slip socks on/at bedside. Patient has had 2 BM tonight. Needed to use lift to get Margaret back into bed as her legs were too weak to hold her up long enough to pivot. Pain has been high tonight, alternating pain medications, see MAR. Bumex continues IVP.    Problem: CARDIOVASCULAR - ADULT  Goal: Maintains optimal cardiac output and hemodynamic stability  Description: INTERVENTIONS:  - Monitor vital signs, rhythm, and trends  - Monitor for bleeding, hypotension and signs of decreased cardiac output  - Evaluate effectiveness of vasoactive medications to optimize hemodynamic stability  - Monitor arterial and/or venous puncture sites for bleeding and/or hematoma  - Assess quality of pulses, skin color and temperature  - Assess for signs of decreased coronary artery perfusion - ex. Angina  - Evaluate fluid balance, assess for edema, trend weights  Outcome: Progressing  Goal: Absence of cardiac arrhythmias or at baseline  Description: INTERVENTIONS:  - Continuous cardiac monitoring, monitor vital signs, obtain 12 lead EKG if indicated  - Evaluate effectiveness of antiarrhythmic and heart rate control medications as ordered  - Initiate emergency measures for life threatening arrhythmias  - Monitor electrolytes and administer replacement therapy as ordered  Outcome: Progressing     Problem: RESPIRATORY - ADULT  Goal: Achieves optimal ventilation and oxygenation  Description: INTERVENTIONS:  - Assess for changes in respiratory status  - Assess for changes in mentation and behavior  - Position to facilitate oxygenation and minimize respiratory effort  - Oxygen supplementation based on oxygen saturation or ABGs  - Provide Smoking Cessation handout, if applicable  - Encourage broncho-pulmonary hygiene including cough, deep breathe, Incentive Spirometry  - Assess the need for suctioning  and perform as needed  - Assess and instruct to report SOB or any respiratory difficulty  - Respiratory Therapy support as indicated  - Manage/alleviate anxiety  - Monitor for signs/symptoms of CO2 retention  Outcome: Progressing     Problem: GENITOURINARY - ADULT  Goal: Absence of urinary retention  Description: INTERVENTIONS:  - Assess patient’s ability to void and empty bladder  - Monitor intake/output and perform bladder scan as needed  - Follow urinary retention protocol/standard of care  - Consider collaborating with pharmacy to review patient's medication profile  - Implement strategies to promote bladder emptying  Outcome: Progressing     Problem: Impaired Functional Mobility  Goal: Achieve highest/safest level of mobility/gait  Description: Interventions:  - Assess patient's functional ability and stability  - Promote increasing activity/tolerance for mobility and gait  - Educate and engage patient/family in tolerated activity level and precautions  Outcome: Progressing

## 2024-02-10 NOTE — PROGRESS NOTES
Patient was brought up on NRB mask, respirator distress noted, spo2 within acceptable range. CXR obtained. Placed pt on NIV d/t increased WOB / CXR. No further orders received.

## 2024-02-10 NOTE — PROGRESS NOTES
[facility]    Progress Note    Margaret Silverio Patient Status:  Inpatient    3/14/1945 MRN [id number]   Location [facility] 3W/SW Attending Rosalinda Barry DO   Hosp Day # 7 PCP Johnathon Rodriguez DO     Chief complaint chf     Subjective:   Margaret Silverio is a(n) 78 year old female Pt seen this am and was awake but hard to understand.   Later in the morning pt had RRT called for \"sob\"     EKG revealed paced rhythm  CXR with effusions and cardiomegaly similar to past   Trop 50   ABG with metabolic acidosis with Lactic 10.     Objective:   Blood pressure 115/76, pulse 60, temperature 97.5 °F (36.4 °C), temperature source Oral, resp. rate 21, weight 147 lb 14.4 oz (67.1 kg), SpO2 100%.      Intake/Output Summary (Last 24 hours) at 2/10/2024 1337  Last data filed at 2/10/2024 0902  Gross per 24 hour   Intake 1125 ml   Output --   Net 1125 ml       Patient Weight(s) for the past 336 hrs:   Weight   24 0503 147 lb 14.4 oz (67.1 kg)   24 0434 149 lb (67.6 kg)   24 0457 149 lb 8 oz (67.8 kg)   24 1108 150 lb 14.4 oz (68.4 kg)   24 0400 149 lb 6.4 oz (67.8 kg)   24 0635 148 lb 14.4 oz (67.5 kg)   24 0453 149 lb 9.6 oz (67.9 kg)   24 0032 149 lb 14.4 oz (68 kg)   24 2053 135 lb (61.2 kg)           General appearance: in distress   Pulmonary:  CTA , decreased at bases   Cardiovascular: S1, S2 normal, no murmur, click, rub or gallop, regular rate and rhythm  Abdominal: soft, non-tender; bowel sounds normal; no masses,  no organomegaly  Extremities: extremities normal, atraumatic, no cyanosis or edema        Medicines:           Lab Results   Component Value Date    WBC 3.8 (L) 02/10/2024    HGB 8.1 (L) 02/10/2024    HCT 25.5 (L) 02/10/2024    PLT 23.0 (L) 02/10/2024    CREATSERUM 1.47 (H) 02/10/2024    BUN 25 (H) 02/10/2024     02/10/2024    K 5.1 02/10/2024    K 5.1 02/10/2024     02/10/2024    CO2 20.0 (L) 02/10/2024      (H) 02/10/2024    CA 8.8 02/10/2024    ALB 3.0 (L) 02/09/2024    ALKPHO 153 (H) 02/09/2024    BILT 1.9 (H) 02/09/2024    TP 6.0 02/09/2024    AST 21 02/09/2024    ALT 11 02/09/2024    PTT 35.2 02/09/2024    INR 1.25 (H) 02/09/2024    T4F 1.2 02/03/2024    TSH 10.681 (H) 02/03/2024    LIP 32 01/13/2024    MG 2.0 02/07/2024    PHOS 2.7 02/09/2024    B12 >2,000 (H) 02/03/2024       XR CHEST AP PORTABLE  (CPT=71045)    Result Date: 2/10/2024  CONCLUSION: No change    Dictated by (CST): Rey Jackson MD on 2/10/2024 at 1:05 PM     Finalized by (CST): Rey Jackson MD on 2/10/2024 at 1:06 PM             Results:     CBC:    Lab Results   Component Value Date    WBC 3.8 (L) 02/10/2024    WBC 3.2 (L) 02/09/2024    WBC 4.5 02/09/2024     Lab Results   Component Value Date    HGB 8.1 (L) 02/10/2024    HGB 8.3 (L) 02/09/2024    HGB 7.7 (L) 02/09/2024      Lab Results   Component Value Date    PLT 23.0 (L) 02/10/2024    PLT 15.0 (LL) 02/09/2024    PLT 26.0 (L) 02/09/2024       Recent Labs   Lab 02/08/24  0705 02/09/24  0501 02/10/24  0745   GLU 87 84 114*   BUN 16 17 25*   CREATSERUM 1.11* 1.11* 1.47*   CA 8.5* 8.4* 8.8    140 137   K 3.8 3.7  3.7 5.1  5.1    106 103   CO2 25.0 25.0 20.0*             Assessment and Plan:           Assessment & Plan:     Acute respiratory failure not hypoxic. Abg with metabolic acidosis. CXR unchanged with cardiomegaly and chf. Lactic 10  - consulted Dr Pacheco who is seeing. Apprec input. Dr Brito to see.   Bolus ordered given elevated lactic   Blood cxs sent   Repeat cbc, cmp  Start meropenem  Start dobutamine  Venous dopplers to r/o dvt.   Repeat echo at bedside       Acute on chronic combined CHF  Severe tricuspid valve regurgitation  Bioprosthetic mitral valve  NICM status post ICD in September 2023--> US with nonocclusive clot on icd lead in subclavian vein. Cards aware   Recent echo showed ejection fraction 37% with grade 4 tricuspid regurg  Cardiology is now on  consult  Lasix given in the emergency room and started on Bumex 1 mg twice daily diuresis as per cards; legs swollen; cxr no chf  Continue Entresto and carvedilol  Sp pacer interrogation   Diuresing well with bumex   Holding doac due to recent GI bleed, may benefit from watchman in near future , although pt has refused in the past   Discussed with cardiology and started on dobutamine drip        2. Pancytopenia/ worsening severe acute TCP:  -had similar issue on last admission Dr Snyder was consulted; thought to be consumptive in seeing of acute illness vs drug induced at the time. No h/o heparin use   - apprec Hem input - platelets now 16 and will get 2 units and prednisone started for possible immune process ; hemolysis labs consistent with this   - hb stable. Recent gi bleed   - cont decadron for now. Repeat cbc, fibrinogen, haptoglobin.      3. Paroxysmal atrial fibrillation   Hold oral anticoagulation given recent GI bleed  Currently rate controlled  Still considering outpatient Watchman procedure given her recurrent GI bleeds     4. Chronic kidney injury stage III  - creat worse today with metabolic acidosis 2/2 diuretics vs lactic acidosis. Lactic acid 10. Blood cxs sent. Started on dobutamine as above and lasix / aldactone on hold.   Baseline creatinine is about 1.3  Follow urine output      5. Subclavian clot on icd lead - no AC at this time given platelets. Discussed with hem         DVT ppx scds               Rosalinda Barry, DO         Chart reviewed, including current vitals, notes, labs and imaging  Labs ordered and medications adjusted as outlined above  Coordinate care with care team/consultants  Discussed with patient results of tests, management plan as outlined above, and the need for ongoing hospitalization  D/w RN     MALLORY high        Dispo: To rehab when platelets are stable

## 2024-02-10 NOTE — PROGRESS NOTES
Progress Note  Margaretbasil Silverio Patient Status:  Inpatient    3/14/1945 MRN [id number]   Location [facility] 3W/SW Attending Rosalinda Barry DO   Hosp Day # 7 PCP Johnathon Rodriguez DO     SUBJECTIVE:    Minimally responsive, opens eyes to name.    VITALS:  /76 (BP Location: Right arm)   Pulse 60   Temp 97.5 °F (36.4 °C) (Oral)   Resp 21   Wt 147 lb 14.4 oz (67.1 kg)   SpO2 100%   BMI 24.61 kg/m²     INTAKE/OUTPUT:    Intake/Output Summary (Last 24 hours) at 2/10/2024 1435  Last data filed at 2/10/2024 0902  Gross per 24 hour   Intake 1125 ml   Output --   Net 1125 ml     Last 3 Weights   24 0503 147 lb 14.4 oz (67.1 kg)   24 0434 149 lb (67.6 kg)   24 0457 149 lb 8 oz (67.8 kg)   24 1108 150 lb 14.4 oz (68.4 kg)   24 0400 149 lb 6.4 oz (67.8 kg)   24 0635 148 lb 14.4 oz (67.5 kg)   24 0453 149 lb 9.6 oz (67.9 kg)   24 0032 149 lb 14.4 oz (68 kg)   24 2053 135 lb (61.2 kg)   24 0536 153 lb 11.2 oz (69.7 kg)   24 0441 151 lb 8 oz (68.7 kg)   24 0535 146 lb (66.2 kg)   24 1312 146 lb (66.2 kg)   24 0432 146 lb (66.2 kg)   24 0430 151 lb (68.5 kg)   01/15/24 0536 153 lb (69.4 kg)   24 0119 150 lb 5.7 oz (68.2 kg)   24 2034 140 lb (63.5 kg)   23 1412 145 lb (65.8 kg)     LABS:  Recent Labs   Lab 24  0705 24  0501 02/10/24  0745   GLU 87 84 114*   BUN 16 17 25*   CREATSERUM 1.11* 1.11* 1.47*   EGFRCR 51* 51* 37*   CA 8.5* 8.4* 8.8    140 137   K 3.8 3.7  3.7 5.1  5.1    106 103   CO2 25.0 25.0 20.0*     Recent Labs   Lab 24  0501 24  1458 02/10/24  0745 02/10/24  1352   RBC 2.45* 2.70* 2.66* 2.72*   HGB 7.7* 8.3* 8.1* 8.6*   HCT 22.7* 25.0* 25.5* 26.9*   MCV 92.7 92.6 95.9 98.9   MCH 31.4 30.7 30.5 31.6   MCHC 33.9 33.2 31.8 32.0   RDW 19.7* 19.3* 20.0* 20.2*   NEPRELIM 3.87  --  3.29 4.42   WBC 4.5 3.2* 3.8* 5.2   PLT 26.0* 15.0* 23.0* 18.0*      No results for input(s): \"TROP\", \"CK\" in the last 168 hours.    DIAGNOSTICS:    TELEMETRY: Paced    ECHO 1/14/2024:  Conclusions:   1. Left ventricle: The cavity size was normal. Wall thickness was normal.      Systolic function was moderately reduced. The estimated ejection fraction      was 35-40%, by biplane method of disks. Unable to assess LV diastolic      function due to heart rhythm.   2. Right ventricle: The cavity size was increased. Systolic function was      reduced.   3. Left atrium: The left atrial volume was moderately increased.   4. Right atrium: The atrium was mildly dilated.   5. Mitral valve: A bioprosthesis is present. The mean diastolic gradient was      5mm Hg.   6. Tricuspid valve: There was severe regurgitation.   7. Pulmonary arteries: Systolic pressure was mildly increased, estimated to      be 43mm Hg.     ROS: Negative unless noted above     PHYSICAL EXAM:  General: Alert and oriented x 1. Lethargic   HEENT: Normocephalic, sclera are nonicteric. Hearing decreased bilaterally.  Neck: No JVD or Carotid bruits. Trachea midline.   Cardiac: Regular rate and rhythm. S1, S2 auscultated. No murmurs, rubs, or gallops appreciated.   Lungs: a/p dull. Chest expansion symmetrical. Increased effort. Bipap.  Abdomen: Soft, non-tender, +BS. No hepatosplenomegaly or appreciable masses.   Extremities: Without clubbing, cyanosis. LUE non-pitting edema. Peripheral pulses are 2+. +UE contractions   Neurologic: Motor and sensory nerves grossly intact.   Psych: Appropriate affect   Skin: Warm and dry. No obvious lesions, wounds, or ulcerations.     MEDICATIONS:   sodium chloride  500 mL Intravenous Once    meropenem  500 mg Intravenous q12h    amiodarone  200 mg Oral Daily    [Held by provider] spironolactone  25 mg Oral Daily    senna-docusate  2 tablet Oral BID    [Held by provider] furosemide  40 mg Oral Daily    dexamethasone  40 mg Oral Daily with breakfast    amitriptyline  25 mg Oral Nightly     carvedilol  25 mg Oral BID    sacubitril-valsartan  1 tablet Oral BID    ferrous sulfate  325 mg Oral Daily with breakfast    folic acid  800 mcg Oral Daily    pantoprazole  40 mg Oral BID AC    alendronate  70 mg Oral Weekly      DOBUTamine     ASSESSMENT:    2/10/2024 RRT called for acute dyspnea with increased work of breathing   - ABG w/ metabolic acidosis, Lactic 10  - Concern for ischemic bowel, abdomen mildly rigid, holding CT scan in lieu of decreased GFR     Dyspnea  - Recent admission for PNA  - CXR w/o pul edema, BNP 18,762    Acute on Chronic HFrEF, NICM, LVEF 35-40%/ Severe TR  - s/p IV Bumex was on Lasix daily and compensated, Aldactone started yesterday   - HF clinic follow up placed 2/9  - On ARNI and BB     HTN- Controlled     Paroxysmal Afib/Flutter   [place] DC-ICD 9/2023  - CCTA for Watchman eval did not clearly visualize the BRANDYN, suggestive of occlusion, waiting for medical records from [place], suspected occlusion device during valve surgery   - She is currently in discussions with Dr. Sargent about Watchman, likely that she had an occlusion device placed during her remote valve surgery, records requested outpatient  - Rate controlled on Coreg, Amiodarone resumed yesterday, Amiodarone was recently decreased in office on 1/29/24 with plans to repeat TSH/T4 in 3 months  - A/c on hold with Hx GIB and thrombocytopenia, Watchman eval in progress outpatient, awaiting records as above   - TSH 10.681, normal T4  - [id number] 7, Has-Bled 3    s/p Bioprosthetic Mitral Valve  - Remote, [place]     Thrombocytopenia   - Heme following, s/p transfusions  - Labs pending now, per Lab report Platelets 18    Hx Recurrent GIB  - EGD/ CLN on 1/27/24 with small transverse colon AVM, s/p PRBC, FFP     LUE Swelling   - US with nonocclusive DVT along posterior margin of ICD lead in left subclavian vein  - May resume A/c if plt count is >50 per heme     CKD III- Stable   Hx of possible TIA/ Seizures November  2022  Abnormal TSH- TSH 10.681, normal T4    PLAN:  - Hold Aldactone and Lasix   - Limited ECHO being completed now, ECHO tech reporting LVEF appears to be at around 15%, and echo density appearing. Formal read pending. Starting Dobutamine now for cardiogenic shock, device interrogation on 2/7 with 98% pacing, may be contributing?  - Device interrogation reviewed by EP earlier this stay AV delay or consider upgrade to CRT-D     Plan of care discussed with patient and RN.     Catrachita Jean Marlon, APRN  2/10/2024  2:36 PM  [phone] ([place])  [phone] ([place])      PM rounds addendum:    Lactic acidosis: Likely due to cardiogenic shock, respiratory status is improved since starting dobutamine.  Echocardiogram with severely reduced ejection fraction.  Will try dual inotropes in hopes of improved perfusion.    HFrEF: End-stage nonischemic cardiomyopathy with widened QRS, RV paced.  She will eventually have BiV upgrade however likely not in acute setting currently.    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    Severe TR: RA and RV essentially a  chamber given that the tricuspid valve is wide open regurgitation.  Only fix would be surgery which she is not a candidate for at this time.    Patient seen and examined independently.  Agree with exam.  Labs reviewed.  Changes to assessment and plan as above.    Twin Boles MD  Interventional Cardiology  [facility]

## 2024-02-11 ENCOUNTER — APPOINTMENT (OUTPATIENT)
Dept: GENERAL RADIOLOGY | Facility: HOSPITAL | Age: 79
End: 2024-02-11
Attending: INTERNAL MEDICINE
Payer: MEDICARE

## 2024-02-11 PROBLEM — I50.20 HFREF (HEART FAILURE WITH REDUCED EJECTION FRACTION) (HCC): Status: ACTIVE | Noted: 2024-02-11

## 2024-02-11 PROBLEM — D64.9 ANEMIA: Status: ACTIVE | Noted: 2024-01-01

## 2024-02-11 PROBLEM — R57.0 CARDIOGENIC SHOCK (HCC): Status: ACTIVE | Noted: 2024-02-11

## 2024-02-11 PROBLEM — R57.0 CARDIOGENIC SHOCK (HCC): Status: ACTIVE | Noted: 2024-01-01

## 2024-02-11 PROBLEM — I50.20 HFREF (HEART FAILURE WITH REDUCED EJECTION FRACTION) (HCC): Status: ACTIVE | Noted: 2024-01-01

## 2024-02-11 PROBLEM — D64.9 ANEMIA: Status: ACTIVE | Noted: 2024-02-11

## 2024-02-11 LAB
ALBUMIN SERPL-MCNC: 3.3 G/DL (ref 3.2–4.8)
ANION GAP SERPL CALC-SCNC: 11 MMOL/L (ref 0–18)
BASOPHILS # BLD AUTO: 0.01 X10(3) UL (ref 0–0.2)
BASOPHILS NFR BLD AUTO: 0.2 %
BILIRUB UR QL: NEGATIVE
BLOOD TYPE BARCODE: 9500
BUN BLD-MCNC: 33 MG/DL (ref 9–23)
BUN/CREAT SERPL: 19.6 (ref 10–20)
CALCIUM BLD-MCNC: 8.5 MG/DL (ref 8.7–10.4)
CHLORIDE SERPL-SCNC: 102 MMOL/L (ref 98–112)
CLARITY UR: CLEAR
CO2 SERPL-SCNC: 24 MMOL/L (ref 21–32)
COLOR UR: YELLOW
CREAT BLD-MCNC: 1.68 MG/DL
CREAT UR-SCNC: 73.1 MG/DL
DEPRECATED RDW RBC AUTO: 57.6 FL (ref 35.1–46.3)
EGFRCR SERPLBLD CKD-EPI 2021: 31 ML/MIN/1.73M2 (ref 60–?)
EOSINOPHIL # BLD AUTO: 0.01 X10(3) UL (ref 0–0.7)
EOSINOPHIL NFR BLD AUTO: 0.2 %
ERYTHROCYTE [DISTWIDTH] IN BLOOD BY AUTOMATED COUNT: 20.1 % (ref 11–15)
GLUCOSE BLD-MCNC: 128 MG/DL (ref 70–99)
GLUCOSE UR-MCNC: NORMAL MG/DL
HCT VFR BLD AUTO: 22.7 %
HGB BLD-MCNC: 7.4 G/DL
HYALINE CASTS #/AREA URNS AUTO: PRESENT /LPF
IMM GRANULOCYTES # BLD AUTO: 0.07 X10(3) UL (ref 0–1)
IMM GRANULOCYTES NFR BLD: 1.1 %
KETONES UR-MCNC: NEGATIVE MG/DL
LEUKOCYTE ESTERASE UR QL STRIP.AUTO: NEGATIVE
LYMPHOCYTES # BLD AUTO: 0.28 X10(3) UL (ref 1–4)
LYMPHOCYTES NFR BLD AUTO: 4.3 %
MCH RBC QN AUTO: 30.3 PG (ref 26–34)
MCHC RBC AUTO-ENTMCNC: 32.6 G/DL (ref 31–37)
MCV RBC AUTO: 93 FL
MONOCYTES # BLD AUTO: 0.25 X10(3) UL (ref 0.1–1)
MONOCYTES NFR BLD AUTO: 3.8 %
NEUTROPHILS # BLD AUTO: 5.93 X10 (3) UL (ref 1.5–7.7)
NEUTROPHILS # BLD AUTO: 5.93 X10(3) UL (ref 1.5–7.7)
NEUTROPHILS NFR BLD AUTO: 90.4 %
NITRITE UR QL STRIP.AUTO: NEGATIVE
OSMOLALITY SERPL CALC.SUM OF ELEC: 293 MOSM/KG (ref 275–295)
PH UR: 5.5 [PH] (ref 5–8)
PLATELET # BLD AUTO: 14 10(3)UL (ref 150–450)
POTASSIUM SERPL-SCNC: 5 MMOL/L (ref 3.5–5.1)
PROT UR-MCNC: 30 MG/DL
RBC # BLD AUTO: 2.44 X10(6)UL
SODIUM SERPL-SCNC: 12 MMOL/L
SODIUM SERPL-SCNC: 137 MMOL/L (ref 136–145)
SP GR UR STRIP: 1.02 (ref 1–1.03)
UNIT VOLUME: 276 ML
UROBILINOGEN UR STRIP-ACNC: NORMAL
WBC # BLD AUTO: 6.6 X10(3) UL (ref 4–11)

## 2024-02-11 PROCEDURE — 99223 1ST HOSP IP/OBS HIGH 75: CPT | Performed by: INTERNAL MEDICINE

## 2024-02-11 PROCEDURE — 99233 SBSQ HOSP IP/OBS HIGH 50: CPT | Performed by: INTERNAL MEDICINE

## 2024-02-11 PROCEDURE — 99233 SBSQ HOSP IP/OBS HIGH 50: CPT | Performed by: HOSPITALIST

## 2024-02-11 PROCEDURE — 99232 SBSQ HOSP IP/OBS MODERATE 35: CPT | Performed by: INTERNAL MEDICINE

## 2024-02-11 PROCEDURE — 71045 X-RAY EXAM CHEST 1 VIEW: CPT | Performed by: INTERNAL MEDICINE

## 2024-02-11 RX ORDER — SODIUM CHLORIDE 9 MG/ML
INJECTION, SOLUTION INTRAVENOUS ONCE
Status: DISCONTINUED | OUTPATIENT
Start: 2024-02-11 | End: 2024-02-14

## 2024-02-11 RX ORDER — SODIUM CHLORIDE 9 MG/ML
INJECTION, SOLUTION INTRAVENOUS ONCE
Status: COMPLETED | OUTPATIENT
Start: 2024-02-11 | End: 2024-02-11

## 2024-02-11 NOTE — PROGRESS NOTES
Cardiology Progress Note    Margaret Silverio Patient Status:  Inpatient    3/14/1945 MRN [id number]   Location [facility] 2W/SW Attending Rosalinda Barry DO   Hosp Day # 8 PCP Johnathon Rodriguez,      78-year-old female presents with shortness of breath 2/3, admitted with decompensated systolic heart failure.    ASSESSMENT:  Lactic acidosis: Likely due to cardiogenic shock, respiratory status is improved since starting dobutamine.  Echocardiogram with severely reduced ejection fraction.  Will try dual inotropes in hopes of improved perfusion.    HFrEF: End-stage nonischemic cardiomyopathy with widened QRS, RV paced.  She will eventually have BiV upgrade however likely not in acute setting currently.    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    Severe TR: RA and RV essentially a  chamber given that the tricuspid valve is wide open regurgitation.  Only fix would be surgery which she is not a candidate for at this time.    Paroxysmal Afib/Flutter:  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  Has not gotten IVIG.  Hx Recurrent GIB: EGD/ CLN on 24 with small transverse colon AVM, s/p PRBC, FFP   LUE Swelling: US with nonocclusive DVT along posterior margin of ICD lead in left subclavian vein  GEORGE on CKD due to underperfusion  Hx of possible TIA/ Seizures 2022  Abnormal TSH- TSH 10.681, normal T4    PLAN:  Continue dual inotropes for now  Goals of care discussion, did have palliative care consult /8  Should be DNR/DNI and palliative care given her poor prognosis requiring dual inotropes to maintain perfusion      Level of care: L3  35-minute critical care    Twin Boles MD  Interventional  Cardiology  [facility]    --------------------------------------------------------------------------------------------------------------------------------  ROS 12 systems reviewed, pertinent findings above.  ROS    History:  Past Medical History:   Diagnosis Date    Anemia     Arrhythmia     Arthritis     Deep vein thrombosis (HCC)     Essential hypertension     High blood pressure     History of blood transfusion     Seizure disorder (HCC)     Seizures (HCC)      Past Surgical History:   Procedure Laterality Date    COLONOSCOPY N/A 01/17/2024    Dr. Rios    COLONOSCOPY N/A 1/17/2024    Procedure: COLONOSCOPY;  Surgeon: Zoraida Rios MD;  Location: [facility] ENDOSCOPY    EGD N/A 01/17/2024    Dr. Rios    OTHER SURGICAL HISTORY  2017    Heart Surgery     OTHER SURGICAL HISTORY  2020    Bilateral shoulder replacement      No family history on file.   reports that she quit smoking about 10 years ago. Her smoking use included cigarettes. She smoked an average of 0.3 packs per day. She has never used smokeless tobacco. She reports that she does not drink alcohol and does not use drugs.    Objective:   Temp: 96.4 °F (35.8 °C)  Pulse: 60  Resp: 14  BP: 139/85    Intake/Output:     Intake/Output Summary (Last 24 hours) at 2/11/2024 0834  Last data filed at 2/11/2024 0640  Gross per 24 hour   Intake 1105.3 ml   Output --   Net 1105.3 ml       Physical Exam:    General: NAD  HEENT: Normocephalic, anicteric sclera, neck supple.  Neck: No JVD, carotids 2+, no bruits.  Cardiac: Regular rate and rhythm. S1, S2 normal. No murmur, pericardial rub, S3.  Lungs: Clear without wheezes, rales, rhonchi or dullness.  Normal excursions and effort.  Abdomen: Soft, non-tender. BS-present.  Extremities: Without clubbing, cyanosis or edema.  Peripheral pulses are 2+.  Neurologic: Non-focal  Skin: Warm and dry.       Twin Boles MD  2/11/2024  8:34 AM

## 2024-02-11 NOTE — PLAN OF CARE
Problem: Patient Centered Care  Goal: Patient preferences are identified and integrated in the patient's plan of care  Description: Interventions:  - What would you like us to know as we care for you? I live with my  and two grandchildren  - Provide timely, complete, and accurate information to patient/family  - Incorporate patient and family knowledge, values, beliefs, and cultural backgrounds into the planning and delivery of care  - Encourage patient/family to participate in care and decision-making at the level they choose  - Honor patient and family perspectives and choices  Outcome: Progressing     Problem: CARDIOVASCULAR - ADULT  Goal: Maintains optimal cardiac output and hemodynamic stability  Description: INTERVENTIONS:  - Monitor vital signs, rhythm, and trends  - Monitor for bleeding, hypotension and signs of decreased cardiac output  - Evaluate effectiveness of vasoactive medications to optimize hemodynamic stability  - Monitor arterial and/or venous puncture sites for bleeding and/or hematoma  - Assess quality of pulses, skin color and temperature  - Assess for signs of decreased coronary artery perfusion - ex. Angina  - Evaluate fluid balance, assess for edema, trend weights  Outcome: Progressing  Goal: Absence of cardiac arrhythmias or at baseline  Description: INTERVENTIONS:  - Continuous cardiac monitoring, monitor vital signs, obtain 12 lead EKG if indicated  - Evaluate effectiveness of antiarrhythmic and heart rate control medications as ordered  - Initiate emergency measures for life threatening arrhythmias  - Monitor electrolytes and administer replacement therapy as ordered  Outcome: Progressing     Problem: RESPIRATORY - ADULT  Goal: Achieves optimal ventilation and oxygenation  Description: INTERVENTIONS:  - Assess for changes in respiratory status  - Assess for changes in mentation and behavior  - Position to facilitate oxygenation and minimize respiratory effort  - Oxygen  supplementation based on oxygen saturation or ABGs  - Provide Smoking Cessation handout, if applicable  - Encourage broncho-pulmonary hygiene including cough, deep breathe, Incentive Spirometry  - Assess the need for suctioning and perform as needed  - Assess and instruct to report SOB or any respiratory difficulty  - Respiratory Therapy support as indicated  - Manage/alleviate anxiety  - Monitor for signs/symptoms of CO2 retention  Outcome: Progressing     Problem: GENITOURINARY - ADULT  Goal: Absence of urinary retention  Description: INTERVENTIONS:  - Assess patient’s ability to void and empty bladder  - Monitor intake/output and perform bladder scan as needed  - Follow urinary retention protocol/standard of care  - Consider collaborating with pharmacy to review patient's medication profile  - Implement strategies to promote bladder emptying  Outcome: Progressing     Problem: Impaired Functional Mobility  Goal: Achieve highest/safest level of mobility/gait  Description: Interventions:  - Assess patient's functional ability and stability  - Promote increasing activity/tolerance for mobility and gait  - Educate and engage patient/family in tolerated activity level and precautions  - Recommend use of sit-stand lift for transfers  - Recommend patient transfer to bedside chair toward strongest side  - When transferring patient, block weaker knee for safety  Outcome: Progressing

## 2024-02-11 NOTE — CONSULTS
[facility]    Report of Consultation    Date of Admission:  2/2/2024  Date of Consult:  2/11/2024   Reason for Consultation:     GEORGE    History of Present Illness:   Patient is a 78 year old female with past medical history of HFrEF with EF 15-20%, nonischemic cardiomyopathy s/p ICD, A-fib off of anticoagulation, GI bleed from AVMs, hypertension, bioprosthetic mitral valve who presented to the emergency room on 2/2 for shortness of breath    Patient was admitted for acute heart failure exacerbation and diuretics dose was increased.  Patient was transferred to ICU for cardiogenic shock and lactic acidosis and respiratory failure.  And was started on dual inotropes-currently on dobutamine and milrinone drip.    Left breast on admit showed BUNs/creatinine 17/1.47 mg/dL.  Creatinine improved to 1.1 mg/dL and now stable at 1.6 for 24 hours.  Currently Hooks in place with urine output of 300 mL this morning.  Son at bedside and able to provide history, patient tired and lethargic.  On 3 L nasal cannula      Past Medical History  Past Medical History:   Diagnosis Date    Anemia     Arrhythmia     Arthritis     Deep vein thrombosis (HCC)     Essential hypertension     High blood pressure     History of blood transfusion     Seizure disorder (HCC)     Seizures (HCC)        Past Surgical History  Past Surgical History:   Procedure Laterality Date    COLONOSCOPY N/A 01/17/2024    Dr. Rios    COLONOSCOPY N/A 1/17/2024    Procedure: COLONOSCOPY;  Surgeon: Zoraida Rios MD;  Location: [facility] ENDOSCOPY    EGD N/A 01/17/2024    Dr. Rios    OTHER SURGICAL HISTORY  2017    Heart Surgery     OTHER SURGICAL HISTORY  2020    Bilateral shoulder replacement        Family History  No family history on file.    Social History  Pediatric History   Patient Parents    Not on file     Other Topics Concern    Not on file   Social History Narrative    Not on file           Current Medications:  Current Facility-Administered Medications    Medication Dose Route Frequency    sodium chloride 0.9% infusion   Intravenous Once    DOBUTamine in dextrose 5% (Dobutrex) 500 mg/250mL infusion premix  5 mcg/kg/min (Dosing Weight) Intravenous Continuous    meropenem (Merrem) 500 mg in sodium chloride 0.9% 100 mL IVPB-MBP  500 mg Intravenous q12h    milrinone in dextrose 5% (Primacor) 20 mg/100mL infusion premix  0.25 mcg/kg/min Intravenous Continuous    amiodarone (Pacerone) tab 200 mg  200 mg Oral Daily    [Held by provider] spironolactone (Aldactone) tab 25 mg  25 mg Oral Daily    senna-docusate (Senokot-S) 8.6-50 MG per tab 2 tablet  2 tablet Oral BID    [Held by provider] furosemide (Lasix) tab 40 mg  40 mg Oral Daily    dexamethasone (Decadron) tab 40 mg  40 mg Oral Daily with breakfast    traMADol (Ultram) tab 50 mg  50 mg Oral Q6H PRN    polyethylene glycol (PEG 3350) (Miralax) 17 g oral packet 17 g  17 g Oral Daily PRN    magnesium hydroxide (Milk of Magnesia) 400 MG/5ML oral suspension 30 mL  30 mL Oral Daily PRN    bisacodyl (Dulcolax) 10 MG rectal suppository 10 mg  10 mg Rectal Daily PRN    fleet enema (Fleet) 7-19 GM/118ML rectal enema 133 mL  1 enema Rectal Once PRN    amitriptyline (Elavil) tab 25 mg  25 mg Oral Nightly    sacubitril-valsartan (Entresto) 24-26 MG per tab 1 tablet  1 tablet Oral BID    ferrous sulfate DR tab 325 mg  325 mg Oral Daily with breakfast    folic acid (Folvite) tab 800 mcg  800 mcg Oral Daily    HYDROcodone-acetaminophen (Norco)  MG per tab 1 tablet  1 tablet Oral Q6H PRN    pantoprazole (Protonix) DR tab 40 mg  40 mg Oral BID AC    alendronate (Fosamax) tab 70 mg  70 mg Oral Weekly       Allergies  Allergies   Allergen Reactions    Lisinopril Coughing       Review of Systems:     Unable to obtain    Physical Exam:   Height: --  Weight: --  BSA (Calculated - sq m): --  Pulse: 60 (02/11 0655)  BP: 139/85 (02/11 0655)  Temp: 96.4 °F (35.8 °C) (02/11 0655)  Do Not Use - Resp Rate: --  SpO2: 100 % (02/11 0655)  Temp:   [96.4 °F (35.8 °C)-97.3 °F (36.3 °C)] 96.4 °F (35.8 °C)  Pulse:  [56-61] 60  Resp:  [11-21] 14  BP: (106-152)/() 139/85  SpO2:  [96 %-100 %] 100 %  SpO2: 100 %     Intake/Output Summary (Last 24 hours) at 2/11/2024 1152  Last data filed at 2/11/2024 0640  Gross per 24 hour   Intake 705.3 ml   Output --   Net 705.3 ml     Wt Readings from Last 5 Encounters:   02/09/24 147 lb 14.4 oz (67.1 kg)   01/20/24 153 lb 11.2 oz (69.7 kg)   12/26/23 145 lb (65.8 kg)   09/21/23 145 lb (65.8 kg)   08/21/23 141 lb 12.8 oz (64.3 kg)       General appearance: Fatigued and lethargy  Head: Normocephalic, atraumatic  Eyes: conjunctivae/corneas clear  Throat: lips, mucosa, and tongue normal; teeth and gums normal  Neck:  no JVD, supple, symmetrical  Abdominal: soft, non-tender;  Extremities: extremities normal, no edema  Skin: No rashes or lesions  Neurologic: Unable to examine  Psychiatric: calm    Results:     Laboratory Data:  Recent Labs   Lab 02/10/24  0745 02/10/24  1352 02/11/24  0514   RBC 2.66* 2.72* 2.44*   HGB 8.1* 8.6* 7.4*   HCT 25.5* 26.9* 22.7*   MCV 95.9 98.9 93.0   NEPRELIM 3.29 4.42 5.93   WBC 3.8* 5.2 6.6   PLT 23.0* 18.0* 14.0*     Recent Labs   Lab 02/06/24  1520 02/07/24  0712 02/09/24  0501 02/10/24  0745 02/10/24  1352 02/11/24  0514   GLU  --    < > 84 114* 90 128*   BUN  --    < > 17 25* 25* 33*   CREATSERUM  --    < > 1.11* 1.47* 1.66* 1.68*   CA  --    < > 8.4* 8.8 8.7 8.5*   ALB 2.99*  --  3.0*  --  3.7 3.3   NA  --    < > 140 137 137 137   K  --    < > 3.7  3.7 5.1  5.1 5.4* 5.0   CL  --    < > 106 103 102 102   CO2  --    < > 25.0 20.0* 16.0* 24.0   ALKPHO  --   --  153*  --  181*  --    AST  --   --  21  --  59*  --    ALT  --   --  11  --  16  --    BILT  --   --  1.9*  --  2.3*  --    TP 5.8  --  6.0  --  7.1  --     < > = values in this interval not displayed.     PTT   Date Value Ref Range Status   02/09/2024 35.2 23.3 - 35.6 seconds Final     INR   Date Value Ref Range Status   02/09/2024  1.25 (H) 0.80 - 1.20 Final     Comment:     Only the INR (not the PT value) should be utilized for   the monitoring of oral anticoagulant therapy.     Recommended therapeutic ranges for anticoagulant therapy are   as follows:   2.0 - 3.0 All indications except for mechanical prosthetic   cardiac valves.     2.5 - 3.5 Mechanical prosthetic cardiac valves.       No results for input(s): \"BNP\" in the last 168 hours.  Lab Results   Component Value Date    COLORUR Yellow 02/11/2024    CLARITY Clear 02/11/2024    SPECGRAVITY 1.017 02/11/2024    GLUUR Normal 02/11/2024    BILUR Negative 02/11/2024    KETUR Negative 02/11/2024    BLOODURINE 2+ 02/11/2024    PHURINE 5.5 02/11/2024    PROUR 30 02/11/2024    UROBILINOGEN Normal 02/11/2024    NITRITE Negative 02/11/2024    LEUUR Negative 02/11/2024           Impression:       Patient is a 78 year old female with past medical history of HFrEF with EF 15-20%, nonischemic cardiomyopathy s/p ICD, A-fib off of anticoagulation, GI bleed from AVMs, hypertension, bioprosthetic mitral valve who presented to the emergency room on 2/2 for shortness of breath    1.GEORGE: Urine output low  UA positive hyaline cast.  FENa low with urine sodium 12  GEORGE secondary to cardiorenal syndrome/cardiogenic shock  Currently on dual inotropes to increase renal perfusion  BUNs/creatinine 17/1.47 mg/dL on admit - >Creatinine improved to 1.1 mg/dL and now stable at 1.6 for 24 hours  Continue to monitor renal function and strict I's and O's  On Entresto twice daily-for now we will continue but if kidney functions worsen tomorrow will consider holding  Patient is not a dialysis candidate given end-stage heart disease    2.cardiogenic shock-HFrEF with EF 15-20%, nonischemic cardiomyopathy s/p ICD, A-fib off of anticoagulation  On dobutamine and milrinone  Cardiology input noted    3.Thrombocytopenia:   Hematology input noted  On steroid-no improvement in platelet  S/p platelet  transfusion    4.hyperkalemia:  Secondary to lactic acidosis and GEORGE  Potassium high normal-low potassium diet  On Entresto-continue for now    Discussed with nursing and will discuss with Dr. Barry      Thank you for allowing me to participate in the care of your patient.    Will Puckett MD  2/11/2024

## 2024-02-11 NOTE — PROGRESS NOTES
Progress Note  Margaretbasil Silverio Patient Status:  Inpatient    3/14/1945 MRN [id number]   Location [facility] 3W/SW Attending Rosalinda Barry DO   Hosp Day # 8 PCP Johnathon Rodriguez DO     SUBJECTIVE:    Minimally responsive, opens eyes to name.    VITALS:  /85   Pulse 60   Temp (!) 96.4 °F (35.8 °C) (Temporal)   Resp 14   Wt 147 lb 14.4 oz (67.1 kg)   SpO2 100%   BMI 24.61 kg/m²     INTAKE/OUTPUT:    Intake/Output Summary (Last 24 hours) at 2024 0702  Last data filed at 2024 0640  Gross per 24 hour   Intake 1105.3 ml   Output --   Net 1105.3 ml     Last 3 Weights   24 0503 147 lb 14.4 oz (67.1 kg)   24 0434 149 lb (67.6 kg)   24 0457 149 lb 8 oz (67.8 kg)   24 1108 150 lb 14.4 oz (68.4 kg)   24 0400 149 lb 6.4 oz (67.8 kg)   24 0635 148 lb 14.4 oz (67.5 kg)   24 0453 149 lb 9.6 oz (67.9 kg)   24 0032 149 lb 14.4 oz (68 kg)   24 2053 135 lb (61.2 kg)   24 0536 153 lb 11.2 oz (69.7 kg)   24 0441 151 lb 8 oz (68.7 kg)   24 0535 146 lb (66.2 kg)   24 1312 146 lb (66.2 kg)   24 0432 146 lb (66.2 kg)   24 0430 151 lb (68.5 kg)   01/15/24 0536 153 lb (69.4 kg)   24 0119 150 lb 5.7 oz (68.2 kg)   24 2034 140 lb (63.5 kg)   23 1412 145 lb (65.8 kg)     LABS:  Recent Labs   Lab 02/10/24  0745 02/10/24  1352 24  0514   * 90 128*   BUN 25* 25* 33*   CREATSERUM 1.47* 1.66* 1.68*   EGFRCR 37* 32* 31*   CA 8.8 8.7 8.5*    137 137   K 5.1  5.1 5.4* 5.0    102 102   CO2 20.0* 16.0* 24.0     Recent Labs   Lab 02/10/24  0745 02/10/24  1352 2414   RBC 2.66* 2.72* 2.44*   HGB 8.1* 8.6* 7.4*   HCT 25.5* 26.9* 22.7*   MCV 95.9 98.9 93.0   MCH 30.5 31.6 30.3   MCHC 31.8 32.0 32.6   RDW 20.0* 20.2* 20.1*   NEPRELIM 3.29 4.42 5.93   WBC 3.8* 5.2 6.6   PLT 23.0* 18.0* 14.0*     No results for input(s): \"TROP\", \"CK\" in the last 168  hours.    DIAGNOSTICS:    TELEMETRY: Paced    ECHO 1/14/2024:  Conclusions:   1. Left ventricle: The cavity size was normal. Wall thickness was normal.      Systolic function was moderately reduced. The estimated ejection fraction      was 35-40%, by biplane method of disks. Unable to assess LV diastolic      function due to heart rhythm.   2. Right ventricle: The cavity size was increased. Systolic function was      reduced.   3. Left atrium: The left atrial volume was moderately increased.   4. Right atrium: The atrium was mildly dilated.   5. Mitral valve: A bioprosthesis is present. The mean diastolic gradient was      5mm Hg.   6. Tricuspid valve: There was severe regurgitation.   7. Pulmonary arteries: Systolic pressure was mildly increased, estimated to      be 43mm Hg.     ROS: Negative unless noted above     PHYSICAL EXAM:  General: Alert and oriented x 1. Lethargic   HEENT: Normocephalic, sclera are nonicteric. Hearing decreased bilaterally.  Neck: No JVD or Carotid bruits. Trachea midline.   Cardiac: Regular rate and rhythm. S1, S2 auscultated. No murmurs, rubs, or gallops appreciated.   Lungs: a/p dull. Chest expansion symmetrical. Increased effort. Bipap.  Abdomen: Soft, non-tender, +BS. No hepatosplenomegaly or appreciable masses.   Extremities: Without clubbing, cyanosis. LUE non-pitting edema. Peripheral pulses are 2+. +UE contractions   Neurologic: Motor and sensory nerves grossly intact.   Psych: Appropriate affect   Skin: Warm and dry. No obvious lesions, wounds, or ulcerations.     MEDICATIONS:   meropenem  500 mg Intravenous q12h    amiodarone  200 mg Oral Daily    [Held by provider] spironolactone  25 mg Oral Daily    senna-docusate  2 tablet Oral BID    [Held by provider] furosemide  40 mg Oral Daily    dexamethasone  40 mg Oral Daily with breakfast    amitriptyline  25 mg Oral Nightly    sacubitril-valsartan  1 tablet Oral BID    ferrous sulfate  325 mg Oral Daily with breakfast    folic acid   800 mcg Oral Daily    pantoprazole  40 mg Oral BID AC    alendronate  70 mg Oral Weekly      DOBUTamine 5 mcg/kg/min (02/10/24 1447)    milrinone in dextrose 5% 0.25 mcg/kg/min (02/10/24 1849)   ASSESSMENT:    2/10/2024 RRT called for acute dyspnea with increased work of breathing   - ABG w/ metabolic acidosis, Lactic 10  - Concern for ischemic bowel, abdomen mildly rigid, holding CT scan in lieu of decreased GFR     Dyspnea  - Recent admission for PNA  - CXR w/o pul edema, BNP 18,762    Acute on Chronic HFrEF, NICM, LVEF 35-40%/ Severe TR  - s/p IV Bumex was on Lasix daily and compensated, Aldactone started yesterday   - HF clinic follow up placed 2/9  - On ARNI and BB     HTN- Controlled     Paroxysmal Afib/Flutter   [facility] DC-ICD 9/2023  - CCTA for Watchman eval did not clearly visualize the BRANDYN, suggestive of occlusion, waiting for medical records from [place], suspected occlusion device during valve surgery   - She is currently in discussions with Dr. Sargent about Watchman, likely that she had an occlusion device placed during her remote valve surgery, records requested outpatient  - Rate controlled on Coreg, Amiodarone resumed yesterday, Amiodarone was recently decreased in office on 1/29/24 with plans to repeat TSH/T4 in 3 months  - A/c on hold with Hx GIB and thrombocytopenia, Watchman eval in progress outpatient, awaiting records as above   - TSH 10.681, normal T4  - [id number] 7, Has-Bled 3    s/p Bioprosthetic Mitral Valve  - Remote, [place]     Thrombocytopenia   - Heme following, s/p transfusions  - Labs pending now, per Lab report Platelets 18    Hx Recurrent GIB  - EGD/ CLN on 1/27/24 with small transverse colon AVM, s/p PRBC, FFP     LUE Swelling   - US with nonocclusive DVT along posterior margin of ICD lead in left subclavian vein  - May resume A/c if plt count is >50 per heme     CKD III- Stable   Hx of possible TIA/ Seizures November 2022  Abnormal TSH- TSH 10.681, normal T4    PLAN:  -  Hold Aldactone and Lasix   - Limited ECHO being completed now, ECHO tech reporting LVEF appears to be at around 15%, and echo density appearing. Formal read pending. Starting Dobutamine now for cardiogenic shock, device interrogation on 2/7 with 98% pacing, may be contributing?  - Device interrogation reviewed by EP earlier this stay AV delay or consider upgrade to CRT-D     Plan of care discussed with patient and RN.     Catrachita Mason, APRN  2/10/2024  2:36 PM  [phone] ([place])  [phone] ([place])      PM rounds addendum:    Lactic acidosis: Likely due to cardiogenic shock, respiratory status is improved since starting dobutamine.  Echocardiogram with severely reduced ejection fraction.  Will try dual inotropes in hopes of improved perfusion.    HFrEF: End-stage nonischemic cardiomyopathy with widened QRS, RV paced.  She will eventually have BiV upgrade however likely not in acute setting currently.    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    Severe TR: RA and RV essentially a  chamber given that the tricuspid valve is wide open regurgitation.  Only fix would be surgery which she is not a candidate for at this time.    Patient seen and examined independently.  Agree with exam.  Labs reviewed.  Changes to assessment and plan as above.    Twin Boles MD  Interventional Cardiology  [facility]

## 2024-02-11 NOTE — PROGRESS NOTES
Hematology note      On ionotrope support 3L o2.  Afebrile       Vitals:    02/11/24 1400   BP: 124/55   Pulse: 60   Resp: 13   Temp:      NAD, reg rate, nd, vaughn, nl msk, nl mood    Data: extensive.   Discussed with medicine team, cards notes, pulm notes, labs cbc met panel       A/P:  78 year old    With end-stage nonischemic cardiomyopathy lactic acidosis secondary to cardiogenic shock history of mitral valve replacement with bioprosthetic valve paroxysmal atrial fibrillation/flutter. DC ICD in place now also anemia and more pronounced thrombocytopenia    Thrombocytopenia without evidence of TMA does not appear to be immune mediated.  Likely secondary to shock/consumption.  She does have some thrombus at ICD lead    History of GI bleed GI AVMs. Anemia no overt hemolysis or bleeding. Monitor     Transfuse if platelets less than 20,000

## 2024-02-11 NOTE — PROGRESS NOTES
[facility]    Progress Note    Margaret Silverio Patient Status:  Inpatient    3/14/1945 MRN [id number]   Location [facility] 2W/SW Attending Rosalinda Barry DO   Hosp Day # 8 PCP Johnathon Rodriguez DO       Subjective:     Constitutional:  Positive for fatigue.   Respiratory:  Negative for cough.    Cardiovascular:  Negative for chest pain.   Gastrointestinal:  Negative for abdominal distention.   Neurological:  Negative for seizures.   Psychiatric/Behavioral:  Positive for decreased concentration.      Patient was seen and examined  Comfortable on room air  Blood pressure better with dobutamine and milrinone drip  Off BiPAP  Poor appetite  Denies abdominal pain  Objective:   Blood pressure 124/55, pulse 60, temperature 98 °F (36.7 °C), temperature source Temporal, resp. rate 13, weight 147 lb 11.3 oz (67 kg), SpO2 96%.  Physical Exam  Constitutional:       Appearance: She is ill-appearing.   HENT:      Head: Atraumatic.      Nose: Nose normal.      Mouth/Throat:      Mouth: Mucous membranes are moist.   Eyes:      General: No scleral icterus.  Cardiovascular:      Rate and Rhythm: Normal rate.      Heart sounds: Murmur heard.      Gallop present.   Pulmonary:      Effort: No respiratory distress.      Breath sounds: No wheezing, rhonchi or rales.   Abdominal:      General: Abdomen is flat. Bowel sounds are normal.      Palpations: Abdomen is soft.      Tenderness: There is no guarding.   Musculoskeletal:      Cervical back: Neck supple. No rigidity.      Right lower leg: Edema present.      Left lower leg: Edema present.   Neurological:      Mental Status: She is oriented to person, place, and time. Mental status is at baseline.         Results:   Lab Results   Component Value Date    WBC 6.6 2024    HGB 7.4 (L) 2024    HCT 22.7 (L) 2024    PLT 14.0 (LL) 2024    CREATSERUM 1.68 (H) 2024    BUN 33 (H) 2024     2024    K 5.0 2024      02/11/2024    CO2 24.0 02/11/2024     (H) 02/11/2024    CA 8.5 (L) 02/11/2024    ALB 3.3 02/11/2024    ALKPHO 181 (H) 02/10/2024    BILT 2.3 (H) 02/10/2024    TP 7.1 02/10/2024    AST 59 (H) 02/10/2024    ALT 16 02/10/2024    PTT 35.2 02/09/2024    INR 1.25 (H) 02/09/2024    T4F 1.2 02/03/2024    TSH 10.681 (H) 02/03/2024    LIP 32 01/13/2024    MG 2.0 02/07/2024    PHOS 2.7 02/09/2024    TROPHS 53 (HH) 02/10/2024    B12 >2,000 (H) 02/03/2024       XR CHEST AP PORTABLE  (CPT=71045)    Result Date: 2/11/2024  CONCLUSION: No change    Dictated by (CST): Rey Jackson MD on 2/11/2024 at 9:11 AM     Finalized by (CST): Rey Jackson MD on 2/11/2024 at 9:11 AM          US VENOUS DOPPLER LEG BILAT - DIAG IMG (CPT=93970)    Result Date: 2/10/2024  CONCLUSION: No DVT    Dictated by (CST): Rey Jackson MD on 2/10/2024 at 3:25 PM     Finalized by (CST): Rey Jackson MD on 2/10/2024 at 3:26 PM          XR CHEST AP PORTABLE  (CPT=71045)    Result Date: 2/10/2024  CONCLUSION: No change    Dictated by (CST): Rey Jackson MD on 2/10/2024 at 1:05 PM     Finalized by (CST): Rey Jackson MD on 2/10/2024 at 1:06 PM             Assessment & Plan:     1-cardiogenic shock with underlying end-stage cardiomyopathy  LVEF 15 % with severe RV dysfunction and severe TR  Hypoperfusion with lactic acidosis  Doubt sepsis component with negative cultures .    HD better with dobutamine and milrinone drip  Entresto and amiodarone  Continue empiric antibiotics  Cardiology following    2-acute respiratory failure secondary to above  CXR massive cardiomegaly with basilar effusion and atelectasis  O2 therapy  PAP as needed and during sleep    3-thrombocytopenia  Likely related to hypoperfusion and shock state  Patient has some thrombosis at ICD leads  Appreciate hematology    4-history of GI bleed and AVM and anemia   Transfuse for hemoglobin less than 7      5-DVT prophylaxis  SCD only since very low platelet    6-full  code  Setting limits is most appropriate  D/w cardiology / will consult palliative care                      Linsey Liao MD  2/11/2024

## 2024-02-11 NOTE — PLAN OF CARE
Problem: Patient Centered Care  Goal: Patient preferences are identified and integrated in the patient's plan of care  Description: Interventions:  - What would you like us to know as we care for you? I live with my  and two grandchildren  - Provide timely, complete, and accurate information to patient/family  - Incorporate patient and family knowledge, values, beliefs, and cultural backgrounds into the planning and delivery of care  - Encourage patient/family to participate in care and decision-making at the level they choose  - Honor patient and family perspectives and choices  Outcome: Progressing     Problem: Patient/Family Goals  Goal: Patient/Family Long Term Goal  Description: Patient's Long Term Goal: To get better    Interventions:  - IV diuretics, prn O2 supplementation, cardiac monitoring and electrolyte protocol  - See additional Care Plan goals for specific interventions  Outcome: Progressing  Goal: Patient/Family Short Term Goal  Description: Patient's Short Term Goal: To breathe better    Interventions:   - IV diuretics, O2 supplementation prn, cardiac monitoring, electrolyte protocol  - See additional Care Plan goals for specific interventions  Outcome: Progressing     Problem: CARDIOVASCULAR - ADULT  Goal: Maintains optimal cardiac output and hemodynamic stability  Description: INTERVENTIONS:  - Monitor vital signs, rhythm, and trends  - Monitor for bleeding, hypotension and signs of decreased cardiac output  - Evaluate effectiveness of vasoactive medications to optimize hemodynamic stability  - Monitor arterial and/or venous puncture sites for bleeding and/or hematoma  - Assess quality of pulses, skin color and temperature  - Assess for signs of decreased coronary artery perfusion - ex. Angina  - Evaluate fluid balance, assess for edema, trend weights  Outcome: Progressing  Goal: Absence of cardiac arrhythmias or at baseline  Description: INTERVENTIONS:  - Continuous cardiac monitoring,  monitor vital signs, obtain 12 lead EKG if indicated  - Evaluate effectiveness of antiarrhythmic and heart rate control medications as ordered  - Initiate emergency measures for life threatening arrhythmias  - Monitor electrolytes and administer replacement therapy as ordered  Outcome: Progressing     Problem: RESPIRATORY - ADULT  Goal: Achieves optimal ventilation and oxygenation  Description: INTERVENTIONS:  - Assess for changes in respiratory status  - Assess for changes in mentation and behavior  - Position to facilitate oxygenation and minimize respiratory effort  - Oxygen supplementation based on oxygen saturation or ABGs  - Provide Smoking Cessation handout, if applicable  - Encourage broncho-pulmonary hygiene including cough, deep breathe, Incentive Spirometry  - Assess the need for suctioning and perform as needed  - Assess and instruct to report SOB or any respiratory difficulty  - Respiratory Therapy support as indicated  - Manage/alleviate anxiety  - Monitor for signs/symptoms of CO2 retention  Outcome: Progressing     Problem: GENITOURINARY - ADULT  Goal: Absence of urinary retention  Description: INTERVENTIONS:  - Assess patient’s ability to void and empty bladder  - Monitor intake/output and perform bladder scan as needed  - Follow urinary retention protocol/standard of care  - Consider collaborating with pharmacy to review patient's medication profile  - Implement strategies to promote bladder emptying  Outcome: Progressing     Problem: Impaired Functional Mobility  Goal: Achieve highest/safest level of mobility/gait  Description: Interventions:  - Assess patient's functional ability and stability  - Promote increasing activity/tolerance for mobility and gait  - Educate and engage patient/family in tolerated activity level and precautions    Outcome: Progressing

## 2024-02-11 NOTE — PROGRESS NOTES
02/11/24 0317   BiPAP   Mask Size Medium   Control Settings   Set Rate 10 breaths/min   Set IPAP 10   Set EPAP 5   Oxygen Percent 30 %   Inspiratory time 1   Insp rise time 2     Pt remains on bipap. Maintaining appropriate SPO2 on monitor

## 2024-02-11 NOTE — PROGRESS NOTES
[facility]    Progress Note    Margaret Silverio Patient Status:  Inpatient    3/14/1945 MRN [id number]   Location [facility] 3W/SW Attending Rosalinda Barry DO   Hosp Day # 8 PCP Johnathon Rodriguez DO     Chief complaint chf     Subjective:   Margaret Silverio is a(n) 78 year old female Pt more alert today.     Denies sob today. No cp      Objective:   Blood pressure 139/85, pulse 60, temperature (!) 96.4 °F (35.8 °C), temperature source Temporal, resp. rate 14, weight 147 lb 14.4 oz (67.1 kg), SpO2 100%.      Intake/Output Summary (Last 24 hours) at 2024 0752  Last data filed at 2024 0640  Gross per 24 hour   Intake 1105.3 ml   Output --   Net 1105.3 ml       Patient Weight(s) for the past 336 hrs:   Weight   24 0503 147 lb 14.4 oz (67.1 kg)   24 0434 149 lb (67.6 kg)   24 0457 149 lb 8 oz (67.8 kg)   24 1108 150 lb 14.4 oz (68.4 kg)   24 0400 149 lb 6.4 oz (67.8 kg)   24 0635 148 lb 14.4 oz (67.5 kg)   24 0453 149 lb 9.6 oz (67.9 kg)   24 0032 149 lb 14.4 oz (68 kg)   24 135 lb (61.2 kg)           General appearance: Alert and oriented x person and place   Pulmonary:  CTA , decreased at bases   Cardiovascular: S1, S2 normal, no murmur, click, rub or gallop, regular rate and rhythm  Abdominal: soft, non-tender; bowel sounds normal; no masses,  no organomegaly  Extremities: extremities normal, atraumatic, no cyanosis or edema        Medicines:           Lab Results   Component Value Date    WBC 6.6 2024    HGB 7.4 (L) 2024    HCT 22.7 (L) 2024    PLT 14.0 (LL) 2024    CREATSERUM 1.68 (H) 2024    BUN 33 (H) 2024     2024    K 5.0 2024     2024    CO2 24.0 2024     (H) 2024    CA 8.5 (L) 2024    ALB 3.7 02/10/2024    ALKPHO 181 (H) 02/10/2024    BILT 2.3 (H) 02/10/2024    TP 7.1 02/10/2024    AST 59 (H) 02/10/2024    ALT 16  02/10/2024    PTT 35.2 02/09/2024    INR 1.25 (H) 02/09/2024    T4F 1.2 02/03/2024    TSH 10.681 (H) 02/03/2024    LIP 32 01/13/2024    MG 2.0 02/07/2024    PHOS 2.7 02/09/2024    B12 >2,000 (H) 02/03/2024       US VENOUS DOPPLER LEG BILAT - DIAG IMG (CPT=93970)    Result Date: 2/10/2024  CONCLUSION: No DVT    Dictated by (CST): Rey Jackson MD on 2/10/2024 at 3:25 PM     Finalized by (CST): Rey Jackson MD on 2/10/2024 at 3:26 PM          XR CHEST AP PORTABLE  (CPT=71045)    Result Date: 2/10/2024  CONCLUSION: No change    Dictated by (CST): Rey Jackson MD on 2/10/2024 at 1:05 PM     Finalized by (CST): Rey Jackson MD on 2/10/2024 at 1:06 PM             Results:     CBC:    Lab Results   Component Value Date    WBC 6.6 02/11/2024    WBC 5.2 02/10/2024    WBC 3.8 (L) 02/10/2024     Lab Results   Component Value Date    HGB 7.4 (L) 02/11/2024    HGB 8.6 (L) 02/10/2024    HGB 8.1 (L) 02/10/2024      Lab Results   Component Value Date    PLT 14.0 (LL) 02/11/2024    PLT 18.0 (LL) 02/10/2024    PLT 23.0 (L) 02/10/2024       Recent Labs   Lab 02/10/24  0745 02/10/24  1352 02/11/24  0514   * 90 128*   BUN 25* 25* 33*   CREATSERUM 1.47* 1.66* 1.68*   CA 8.8 8.7 8.5*    137 137   K 5.1  5.1 5.4* 5.0    102 102   CO2 20.0* 16.0* 24.0             Assessment and Plan:           Assessment & Plan:     Acute respiratory failure 2/2 cardiogenic shock   Urine output 350 overnight - plan for rivero due to retention and strict I/o's   Repeat CXR unchanged   Abg with metabolic acidosis.Lactic 10  Apprec pulm and cards consult   Repeat echo with EF 15%, severe TR  Blood cxs sent - ntd. Cont meropenem and stop tomorrow if still neg   Cont dobutamine and milrinonen per Cards   Strict I/o and daily wts   Monitor creatinine closely which is rising. Renal consulted   Severe tricuspid valve regurgitation  Bioprosthetic mitral valve  NICM status post ICD in September 2023--> US with nonocclusive clot on icd lead in  subclavian vein. Cards aware   Recent echo showed ejection fraction 37% with grade 4 tricuspid regurg  Entresto, lasix and coreg on hold   Sp pacer interrogation - will need biv  Holding doac due to recent GI bleed, may benefit from watchman in near future , although pt has refused in the past       2. Pancytopenia/ worsening severe acute TCP:  -had similar issue on last admission Dr Snyder was consulted; thought to be consumptive in seeing of acute illness vs drug induced at the time. No h/o heparin use   - apprec Hem input - platelets now 14 and will get 1 unit and prednisone started for possible immune process   - haptoglobin decreased and ldh elevated   - hb stable. Recent gi bleed   - cont decadron for now. Repeat fibrinogen neg      3. Paroxysmal atrial fibrillation   Hold oral anticoagulation given recent GI bleed  Currently rate controlled  Still considering outpatient Watchman procedure given her recurrent GI bleeds     4. Chronic kidney injury stage III  - creat worse today with lactic acidosis and urinary retention - plan rivero.   - strict I/o's   - daily wts   - check urine sodium, creat and ua       5. Subclavian clot on icd lead - no AC at this time given platelets. Discussed with hem         DVT ppx scds               Rosalinda Barry, DO         Chart reviewed, including current vitals, notes, labs and imaging  Labs ordered and medications adjusted as outlined above  Coordinate care with care team/consultants  Discussed with patient results of tests, management plan as outlined above, and the need for ongoing hospitalization  D/w RN     MDM high        Dispo: To rehab when platelets are stable

## 2024-02-11 NOTE — PLAN OF CARE
Problem: Patient Centered Care  Goal: Patient preferences are identified and integrated in the patient's plan of care  Description: Interventions:  - What would you like us to know as we care for you? I live with my  and two grandchildren  - Provide timely, complete, and accurate information to patient/family  - Incorporate patient and family knowledge, values, beliefs, and cultural backgrounds into the planning and delivery of care  - Encourage patient/family to participate in care and decision-making at the level they choose  - Honor patient and family perspectives and choices  Outcome: Progressing     Problem: CARDIOVASCULAR - ADULT  Goal: Maintains optimal cardiac output and hemodynamic stability  Description: INTERVENTIONS:  - Monitor vital signs, rhythm, and trends  - Monitor for bleeding, hypotension and signs of decreased cardiac output  - Evaluate effectiveness of vasoactive medications to optimize hemodynamic stability  - Monitor arterial and/or venous puncture sites for bleeding and/or hematoma  - Assess quality of pulses, skin color and temperature  - Assess for signs of decreased coronary artery perfusion - ex. Angina  - Evaluate fluid balance, assess for edema, trend weights  Outcome: Progressing  Goal: Absence of cardiac arrhythmias or at baseline  Description: INTERVENTIONS:  - Continuous cardiac monitoring, monitor vital signs, obtain 12 lead EKG if indicated  - Evaluate effectiveness of antiarrhythmic and heart rate control medications as ordered  - Initiate emergency measures for life threatening arrhythmias  - Monitor electrolytes and administer replacement therapy as ordered  Outcome: Progressing     Problem: RESPIRATORY - ADULT  Goal: Achieves optimal ventilation and oxygenation  Description: INTERVENTIONS:  - Assess for changes in respiratory status  - Assess for changes in mentation and behavior  - Position to facilitate oxygenation and minimize respiratory effort  - Oxygen  supplementation based on oxygen saturation or ABGs  - Provide Smoking Cessation handout, if applicable  - Encourage broncho-pulmonary hygiene including cough, deep breathe, Incentive Spirometry  - Assess the need for suctioning and perform as needed  - Assess and instruct to report SOB or any respiratory difficulty  - Respiratory Therapy support as indicated  - Manage/alleviate anxiety  - Monitor for signs/symptoms of CO2 retention  Outcome: Progressing     Problem: GENITOURINARY - ADULT  Goal: Absence of urinary retention  Description: INTERVENTIONS:  - Assess patient’s ability to void and empty bladder  - Monitor intake/output and perform bladder scan as needed  - Follow urinary retention protocol/standard of care  - Consider collaborating with pharmacy to review patient's medication profile  - Implement strategies to promote bladder emptying  Outcome: Progressing     Problem: Impaired Functional Mobility  Goal: Achieve highest/safest level of mobility/gait  Description: Interventions:  - Assess patient's functional ability and stability  - Promote increasing activity/tolerance for mobility and gait  - Educate and engage patient/family in tolerated activity level and precautions  - Recommend use of sit-stand lift for transfers  - Recommend use of total lift for transfers  - When transferring patient, block weaker knee for safety  - Recommend use of chair position in bed 3 times per day  Outcome: Progressing

## 2024-02-11 NOTE — PROGRESS NOTES
Hematology note      On ionotrope support 3L o2.  Afebrile       Vitals:    02/11/24 1400   BP: 124/55   Pulse: 60   Resp: 13   Temp:      NAD, reg rate, nd, vaughn, nl msk, nl mood    Data: moderate cards notes, pulm notes, labs cbc met panel       A/P:  78 year old    With end-stage nonischemic cardiomyopathy lactic acidosis secondary to cardiogenic shock history of mitral valve replacement with bioprosthetic valve paroxysmal atrial fibrillation/flutter. DC ICD in place now also anemia and more pronounced thrombocytopenia    Thrombocytopenia without evidence of TMA does not appear to be immune mediated.  Likely secondary to shock/consumption.  She does have some thrombus at ICD lead    History of GI bleed GI AVMs. Anemia no overt hemolysis or bleeding. Monitor     Transfuse if platelets less than 20,000

## 2024-02-12 ENCOUNTER — APPOINTMENT (OUTPATIENT)
Dept: GENERAL RADIOLOGY | Facility: HOSPITAL | Age: 79
End: 2024-02-12
Attending: INTERNAL MEDICINE
Payer: MEDICARE

## 2024-02-12 LAB
ALBUMIN SERPL-MCNC: 3 G/DL (ref 3.2–4.8)
ALBUMIN/GLOB SERPL: 1.1 {RATIO} (ref 1–2)
ALP LIVER SERPL-CCNC: 147 U/L
ALT SERPL-CCNC: 29 U/L
ANION GAP SERPL CALC-SCNC: 9 MMOL/L (ref 0–18)
AST SERPL-CCNC: 118 U/L (ref ?–34)
BASE EXCESS BLD CALC-SCNC: 5.4 MMOL/L (ref ?–2)
BASOPHILS # BLD AUTO: 0.01 X10(3) UL (ref 0–0.2)
BASOPHILS NFR BLD AUTO: 0.2 %
BILIRUB SERPL-MCNC: 1.9 MG/DL (ref 0.2–1.1)
BLOOD TYPE BARCODE: 6200
BUN BLD-MCNC: 35 MG/DL (ref 9–23)
BUN/CREAT SERPL: 21.3 (ref 10–20)
CA-I BLD-SCNC: 1.18 MMOL/L (ref 0.95–1.32)
CALCIUM BLD-MCNC: 8.7 MG/DL (ref 8.7–10.4)
CHLORIDE SERPL-SCNC: 105 MMOL/L (ref 98–112)
CO2 SERPL-SCNC: 26 MMOL/L (ref 21–32)
COHGB MFR BLD: 2.6 % (ref 0–3)
CREAT BLD-MCNC: 1.64 MG/DL
DEPRECATED RDW RBC AUTO: 58.1 FL (ref 35.1–46.3)
EGFRCR SERPLBLD CKD-EPI 2021: 32 ML/MIN/1.73M2 (ref 60–?)
EOSINOPHIL # BLD AUTO: 0 X10(3) UL (ref 0–0.7)
EOSINOPHIL NFR BLD AUTO: 0 %
ERYTHROCYTE [DISTWIDTH] IN BLOOD BY AUTOMATED COUNT: 21 % (ref 11–15)
GLOBULIN PLAS-MCNC: 2.8 G/DL (ref 2.8–4.4)
GLUCOSE BLD-MCNC: 134 MG/DL (ref 70–99)
HCO3 BLDA-SCNC: 29.1 MEQ/L (ref 21–27)
HCT VFR BLD AUTO: 21.8 %
HGB BLD-MCNC: 7.1 G/DL
HGB BLD-MCNC: 8.2 G/DL
IMM GRANULOCYTES # BLD AUTO: 0.04 X10(3) UL (ref 0–1)
IMM GRANULOCYTES NFR BLD: 0.8 %
LACTATE BLD-SCNC: 1.1 MMOL/L (ref 0.5–2)
LYMPHOCYTES # BLD AUTO: 0.14 X10(3) UL (ref 1–4)
LYMPHOCYTES NFR BLD AUTO: 2.7 %
MCH RBC QN AUTO: 30.6 PG (ref 26–34)
MCHC RBC AUTO-ENTMCNC: 32.6 G/DL (ref 31–37)
MCV RBC AUTO: 94 FL
METHGB MFR BLD: 0.8 % SAT (ref 0.4–1.5)
MODIFIED ALLEN TEST: POSITIVE
MONOCYTES # BLD AUTO: 0.19 X10(3) UL (ref 0.1–1)
MONOCYTES NFR BLD AUTO: 3.7 %
NEUTROPHILS # BLD AUTO: 4.8 X10 (3) UL (ref 1.5–7.7)
NEUTROPHILS # BLD AUTO: 4.8 X10(3) UL (ref 1.5–7.7)
NEUTROPHILS NFR BLD AUTO: 92.6 %
O2 CT BLD-SCNC: 10.7 VOL% (ref 15–23)
OSMOLALITY SERPL CALC.SUM OF ELEC: 300 MOSM/KG (ref 275–295)
OXYGEN LITERS/MINUTE: 3 L/MIN
PCO2 BLDA: 33 MM HG (ref 35–45)
PH BLDA: 7.54 [PH] (ref 7.35–7.45)
PLATELET # BLD AUTO: 27 10(3)UL (ref 150–450)
PO2 BLDA: 62 MM HG (ref 80–100)
POTASSIUM BLD-SCNC: 4 MMOL/L (ref 3.6–5.1)
POTASSIUM SERPL-SCNC: 4.2 MMOL/L (ref 3.5–5.1)
PROT SERPL-MCNC: 5.8 G/DL (ref 5.7–8.2)
PUNCTURE CHARGE: YES
RBC # BLD AUTO: 2.32 X10(6)UL
SAO2 % BLDA: 95.8 % (ref 94–100)
SODIUM BLD-SCNC: 133 MMOL/L (ref 135–145)
SODIUM SERPL-SCNC: 140 MMOL/L (ref 136–145)
UNIT VOLUME: 273 ML
WBC # BLD AUTO: 5.2 X10(3) UL (ref 4–11)

## 2024-02-12 PROCEDURE — 99233 SBSQ HOSP IP/OBS HIGH 50: CPT | Performed by: INTERNAL MEDICINE

## 2024-02-12 PROCEDURE — 99232 SBSQ HOSP IP/OBS MODERATE 35: CPT | Performed by: INTERNAL MEDICINE

## 2024-02-12 PROCEDURE — 99231 SBSQ HOSP IP/OBS SF/LOW 25: CPT | Performed by: REGISTERED NURSE

## 2024-02-12 PROCEDURE — 71045 X-RAY EXAM CHEST 1 VIEW: CPT | Performed by: INTERNAL MEDICINE

## 2024-02-12 PROCEDURE — 99233 SBSQ HOSP IP/OBS HIGH 50: CPT | Performed by: HOSPITALIST

## 2024-02-12 RX ORDER — BUMETANIDE 0.25 MG/ML
1 INJECTION INTRAMUSCULAR; INTRAVENOUS
Status: DISCONTINUED | OUTPATIENT
Start: 2024-02-12 | End: 2024-02-12

## 2024-02-12 NOTE — PROGRESS NOTES
[facility]    Progress Note    Margaret Silverio Patient Status:  Inpatient    3/14/1945 MRN [id number]   Location [facility] 3W/SW Attending Rosalinda Barry DO   Hosp Day # 9 PCP Johnathon Rodriguez DO     Chief complaint chf     Subjective:   Margaret Silverio is a(n) 78 year old female Pt more alert today but very weak.       Objective:   Blood pressure 148/65, pulse 69, temperature 97.4 °F (36.3 °C), temperature source Temporal, resp. rate 19, weight 149 lb 14.6 oz (68 kg), SpO2 97%.      Intake/Output Summary (Last 24 hours) at 2024 1229  Last data filed at 2024 0400  Gross per 24 hour   Intake 656.5 ml   Output 200 ml   Net 456.5 ml       Patient Weight(s) for the past 336 hrs:   Weight   24 0800 149 lb 14.6 oz (68 kg)   24 0000 148 lb 13 oz (67.5 kg)   24 0800 147 lb 11.3 oz (67 kg)   24 0503 147 lb 14.4 oz (67.1 kg)   24 0434 149 lb (67.6 kg)   24 0457 149 lb 8 oz (67.8 kg)   24 1108 150 lb 14.4 oz (68.4 kg)   24 0400 149 lb 6.4 oz (67.8 kg)   24 0635 148 lb 14.4 oz (67.5 kg)   24 0453 149 lb 9.6 oz (67.9 kg)   24 0032 149 lb 14.4 oz (68 kg)   24 135 lb (61.2 kg)           General appearance: Alert and oriented x person and place   Pulmonary:  CTA , decreased at bases   Cardiovascular: S1, S2 normal, no murmur, click, rub or gallop, regular rate and rhythm  Abdominal: soft, non-tender; bowel sounds normal; no masses,  no organomegaly  Extremities: extremities normal, atraumatic, no cyanosis or edema        Medicines:           Lab Results   Component Value Date    WBC 5.2 2024    HGB 7.1 (L) 2024    HCT 21.8 (L) 2024    PLT 27.0 (L) 2024    CREATSERUM 1.64 (H) 2024    BUN 35 (H) 2024     2024    K 4.2 2024     2024    CO2 26.0 2024     (H) 2024    CA 8.7 2024    ALB 3.0 (L) 2024    ALKPHO 147  (H) 02/12/2024    BILT 1.9 (H) 02/12/2024    TP 5.8 02/12/2024     (H) 02/12/2024    ALT 29 02/12/2024    PTT 35.2 02/09/2024    INR 1.25 (H) 02/09/2024    T4F 1.2 02/03/2024    TSH 10.681 (H) 02/03/2024    LIP 32 01/13/2024    MG 2.0 02/07/2024    PHOS 2.7 02/09/2024    B12 >2,000 (H) 02/03/2024       XR CHEST AP PORTABLE  (CPT=71045)    Result Date: 2/12/2024  CONCLUSION:  1. Moderate cardiomegaly.  Pulmonary venous distention.  Heart valve prosthesis. 2. Moderate perihilar lower lobe mixed alveolar and interstitial airspace opacification with bilateral effusions suggesting pulmonary congestive changes.  Slight progression right lung base.    Dictated by (CST): Justin Stark MD on 2/12/2024 at 7:51 AM     Finalized by (CST): Justin Stark MD on 2/12/2024 at 7:54 AM          XR CHEST AP PORTABLE  (CPT=71045)    Result Date: 2/11/2024  CONCLUSION: No change    Dictated by (CST): Rey Jackson MD on 2/11/2024 at 9:11 AM     Finalized by (CST): Rey Jackson MD on 2/11/2024 at 9:11 AM          US VENOUS DOPPLER LEG BILAT - DIAG IMG (CPT=93970)    Result Date: 2/10/2024  CONCLUSION: No DVT    Dictated by (CST): Rey Jackson MD on 2/10/2024 at 3:25 PM     Finalized by (CST): Rey Jackson MD on 2/10/2024 at 3:26 PM          XR CHEST AP PORTABLE  (CPT=71045)    Result Date: 2/10/2024  CONCLUSION: No change    Dictated by (CST): Rey Jackson MD on 2/10/2024 at 1:05 PM     Finalized by (CST): Rey Jackson MD on 2/10/2024 at 1:06 PM             Results:     CBC:    Lab Results   Component Value Date    WBC 5.2 02/12/2024    WBC 6.6 02/11/2024    WBC 5.2 02/10/2024     Lab Results   Component Value Date    HGB 7.1 (L) 02/12/2024    HGB 7.4 (L) 02/11/2024    HGB 8.6 (L) 02/10/2024      Lab Results   Component Value Date    PLT 27.0 (L) 02/12/2024    PLT 14.0 (LL) 02/11/2024    PLT 18.0 (LL) 02/10/2024       Recent Labs   Lab 02/10/24  1352 02/11/24  0514 02/12/24  0455   GLU 90 128* 134*   BUN 25* 33*  35*   CREATSERUM 1.66* 1.68* 1.64*   CA 8.7 8.5* 8.7    137 140   K 5.4* 5.0 4.2    102 105   CO2 16.0* 24.0 26.0             Assessment and Plan:           Assessment & Plan:     Acute respiratory failure 2/2 cardiogenic shock   Repeat CXR with CHF. Restart lasix today   Abg with metabolic acidosis.Lactic 10-> 4. Repeat today   Apprec pulm and cards consult   Repeat echo with EF 15%, severe TR  Blood cxs sent - ntd. Cont meropenem for now   Cont dobutamine and milrinone per Cards   Strict I/o and daily wts   Monitor creatinine closely which is rising. Renal consulted - apprec input   Severe tricuspid valve regurgitation  Bioprosthetic mitral valve  NICM status post ICD in September 2023--> US with nonocclusive clot on icd lead in subclavian vein. Cards aware   Recent echo showed ejection fraction 37% with grade 4 tricuspid regurg  Entresto, lasix and coreg on hold   Sp pacer interrogation - will need biv  Holding doac due to recent GI bleed, may benefit from watchman in near future , although pt has refused in the past       2. Pancytopenia/ worsening severe acute TCP:  -had similar issue on last admission Dr Snyder was consulted; thought to be consumptive in seeing of acute illness vs drug induced at the time. No h/o heparin use   - apprec Hem input - platelets now 14 and will get 1 unit and prednisone started for possible immune process   - haptoglobin decreased and ldh elevated   - hb 7.1. plan for one unit   - cont decadron for now. Repeat fibrinogen neg      3. Paroxysmal atrial fibrillation   Hold oral anticoagulation given recent GI bleed  Currently rate controlled  Still considering outpatient Watchman procedure given her recurrent GI bleeds     4. Chronic kidney injury stage III  - creat stable ; 200 out   - strict I/o's   - daily wts       5. Subclavian clot on icd lead - no AC at this time given platelets. Discussed with hem         DVT ppx scds      Discussed with pt's daughter Barbi  with [facility]. She understands her heart fx is much worse. Discussed with Dr Lr.         Rosalinda Barry, DO         Chart reviewed, including current vitals, notes, labs and imaging  Labs ordered and medications adjusted as outlined above  Coordinate care with care team/consultants  Discussed with patient results of tests, management plan as outlined above, and the need for ongoing hospitalization  D/w RN     MDM high        Dispo: To rehab when platelets are stable

## 2024-02-12 NOTE — PROGRESS NOTES
[facility]    Progress Note    Margaret Silverio Patient Status:  Inpatient    3/14/1945 MRN [id number]   Location [facility] 2W/SW Attending Rosalinda Barry DO   Hosp Day # 9 PCP Johnathon Rodriguez,        Subjective:   Margaret Silverio is a(n) 78 year old female     ROS:     Constitutional: States she feels okay complains of hands being swollen very weak  ENMT:  Negative for ear drainage, hearing loss and nasal drainage  Eyes:  Negative for eye discharge and vision loss  Cardiovascular:  Negative for chest pain, sob, irregular heartbeat/palpitations  Respiratory: Some shortness of breath at rest  Gastrointestinal:  Negative for abdominal pain, constipation, decreased appetite, diarrhea and vomiting  Genitourinary:  Negative for dysuria and hematuria  Endocrine:  Negative for abnormal sleep patterns, increased activity, polydipsia and polyphagia  Hema/Lymph:  Negative for easy bleeding and easy bruising  Integumentary:  Negative for pruritus and rash  Musculoskeletal:  Negative for bone/joint symptoms  Neurological: Very weak bedridden  Psychiatric:  Negative for inappropriate interaction and psychiatric symptoms      Vitals:    24 1600   BP: 119/50   Pulse: 66   Resp: 16   Temp:            PHYSICAL EXAM:   Constitutional: appears well hydrated alert and responsive no acute distress noted  Head/Face: normocephalic  Eyes/Vision: normal extraocular motion is intact  Nose/Mouth/Throat:mucous membranes are moist and no oral lesions are noted  Neck/Thyroid: neck is supple without adenopathy  Lymphatic: no abnormal cervical, supraclavicular adenopathy is noted  Respiratory:  lungs are clear to auscultation bilaterally, normal respiratory effort  Cardiovascular: regular rate and rhythm no murmurs, gallups, or rubs  Abdomen: soft, non-tender, non-distended, BS normal  Vascular: well perfused femoral, and pedal pulses normal  Skin/Hair: no unusual rashes present, no abnormal  bruising noted  Back/Spine: no abnormalities noted  Musculoskeletal: full ROM all extremities good strength  no deformities  Extremities trace edema  Neurological:  Grossly normal    Results:     Laboratory Data:  Lab Results   Component Value Date    WBC 5.2 02/12/2024    HGB 7.1 (L) 02/12/2024    HCT 21.8 (L) 02/12/2024    PLT 27.0 (L) 02/12/2024    CREATSERUM 1.64 (H) 02/12/2024    BUN 35 (H) 02/12/2024     02/12/2024    K 4.2 02/12/2024     02/12/2024    CO2 26.0 02/12/2024     (H) 02/12/2024    CA 8.7 02/12/2024    ALB 3.0 (L) 02/12/2024    ALKPHO 147 (H) 02/12/2024    BILT 1.9 (H) 02/12/2024    TP 5.8 02/12/2024     (H) 02/12/2024    ALT 29 02/12/2024    PTT 35.2 02/09/2024    INR 1.25 (H) 02/09/2024    T4F 1.2 02/03/2024    TSH 10.681 (H) 02/03/2024    LIP 32 01/13/2024    MG 2.0 02/07/2024    PHOS 2.7 02/09/2024    B12 >2,000 (H) 02/03/2024       Imaging:  [unfilled]   XR CHEST AP PORTABLE  (CPT=71045)    Result Date: 2/12/2024  CONCLUSION:  1. Moderate cardiomegaly.  Pulmonary venous distention.  Heart valve prosthesis. 2. Moderate perihilar lower lobe mixed alveolar and interstitial airspace opacification with bilateral effusions suggesting pulmonary congestive changes.  Slight progression right lung base.    Dictated by (CST): Justin Stark MD on 2/12/2024 at 7:51 AM     Finalized by (CST): Justin Stark MD on 2/12/2024 at 7:54 AM          XR CHEST AP PORTABLE  (CPT=71045)    Result Date: 2/11/2024  CONCLUSION: No change    Dictated by (CST): Rey Jackson MD on 2/11/2024 at 9:11 AM     Finalized by (CST): Rey Jackson MD on 2/11/2024 at 9:11 AM             ASSESSMENT/PLAN:   Assessment       1 class IV heart failure EF about 15% heart failure on exam  On Bumex 1 mg every 12 hours on dobutamine and milrinone and Entresto will increase her Bumex to a Bumex drip 0.25 every hour  Watch urine output and clinical status  Currently on 3 L of oxygen   #2 GEORGE baseline  creatinine about 1.3 now 1.6 will watch for any further renal decompensation  Urine sodium low but I do not feel she is intravascularly depleted most likely due to heart failure  #3 anemia hemoglobin 7.1 transfuse under 7 check iron studies platelets low at 27,000  Hematology thinks low platelets due to shock rather than autoimmune transfuse under 20,000  Baseline platelets within normal limits    #4 A-fib rate controlled no anticoagulation at this time is on amiodarone    Spoke to family will follow  2/12/2024  Ayush Key MD

## 2024-02-12 NOTE — PLAN OF CARE
Problem: Patient Centered Care  Goal: Patient preferences are identified and integrated in the patient's plan of care  Description: Interventions:  - What would you like us to know as we care for you? I live with my  and two grandchildren  - Provide timely, complete, and accurate information to patient/family  - Incorporate patient and family knowledge, values, beliefs, and cultural backgrounds into the planning and delivery of care  - Encourage patient/family to participate in care and decision-making at the level they choose  - Honor patient and family perspectives and choices  Outcome: Progressing     Problem: Patient/Family Goals  Goal: Patient/Family Long Term Goal  Description: Patient's Long Term Goal: To get better    Interventions:  - IV diuretics, prn O2 supplementation, cardiac monitoring and electrolyte protocol  - See additional Care Plan goals for specific interventions  Outcome: Progressing  Goal: Patient/Family Short Term Goal  Description: Patient's Short Term Goal: To breathe better    Interventions:   - IV diuretics, O2 supplementation prn, cardiac monitoring, electrolyte protocol  - See additional Care Plan goals for specific interventions  Outcome: Progressing     Problem: Patient/Family Goals  Goal: Patient/Family Short Term Goal  Description: Patient's Short Term Goal: To breathe better    Interventions:   - IV diuretics, O2 supplementation prn, cardiac monitoring, electrolyte protocol  - See additional Care Plan goals for specific interventions  Outcome: Progressing     Problem: CARDIOVASCULAR - ADULT  Goal: Maintains optimal cardiac output and hemodynamic stability  Description: INTERVENTIONS:  - Monitor vital signs, rhythm, and trends  - Monitor for bleeding, hypotension and signs of decreased cardiac output  - Evaluate effectiveness of vasoactive medications to optimize hemodynamic stability  - Monitor arterial and/or venous puncture sites for bleeding and/or hematoma  - Assess  quality of pulses, skin color and temperature  - Assess for signs of decreased coronary artery perfusion - ex. Angina  - Evaluate fluid balance, assess for edema, trend weights  Outcome: Progressing  Goal: Absence of cardiac arrhythmias or at baseline  Description: INTERVENTIONS:  - Continuous cardiac monitoring, monitor vital signs, obtain 12 lead EKG if indicated  - Evaluate effectiveness of antiarrhythmic and heart rate control medications as ordered  - Initiate emergency measures for life threatening arrhythmias  - Monitor electrolytes and administer replacement therapy as ordered  Outcome: Progressing     Problem: RESPIRATORY - ADULT  Goal: Achieves optimal ventilation and oxygenation  Description: INTERVENTIONS:  - Assess for changes in respiratory status  - Assess for changes in mentation and behavior  - Position to facilitate oxygenation and minimize respiratory effort  - Oxygen supplementation based on oxygen saturation or ABGs  - Provide Smoking Cessation handout, if applicable  - Encourage broncho-pulmonary hygiene including cough, deep breathe, Incentive Spirometry  - Assess the need for suctioning and perform as needed  - Assess and instruct to report SOB or any respiratory difficulty  - Respiratory Therapy support as indicated  - Manage/alleviate anxiety  - Monitor for signs/symptoms of CO2 retention  Outcome: Progressing     Problem: GENITOURINARY - ADULT  Goal: Absence of urinary retention  Description: INTERVENTIONS:  - Assess patient’s ability to void and empty bladder  - Monitor intake/output and perform bladder scan as needed  - Follow urinary retention protocol/standard of care  - Consider collaborating with pharmacy to review patient's medication profile  - Implement strategies to promote bladder emptying  Outcome: Progressing     Problem: Impaired Functional Mobility  Goal: Achieve highest/safest level of mobility/gait  Description: Interventions:  - Assess patient's functional ability and  stability  - Promote increasing activity/tolerance for mobility and gait  - Educate and engage patient/family in tolerated activity level and precautions    Outcome: Progressing

## 2024-02-12 NOTE — PROGRESS NOTES
[facility]    Progress Note      Assessment and Plan:   1.  Acute on chronic respiratory failure-the patient has a dramatic lactic acidosis of uncertain etiology.  I suspect organ hypoperfusion with cardiogenic shock.  There is no fever nor leukocytosis to implicate septic shock.  She has mild chronic anemia but without evidence of slick bleed, although she has prior history of AVMs.  There was nonocclusive thrombus at the subclavian vein associated with a pacemaker wire.  Her platelets are 23,000 making embolism less likely.  She is status post mitral valve replacement.  I added meropenem to cover all bases; however, I do not think this is sepsis.  The echocardiography demonstrates an ejection fraction of 15 to 20% with stage IV diastolic dysfunction, dyssynergic septum, mitral valve bioprosthesis and wide-open TR.  Lower extremity venous ultrasound was negative for clot.  Small bilateral pleural effusions and massive heart shadow on chest x-ray.        Recommendations:  1.  Dual inotropes and as per cardiology  2.  PAP therapy as needed  3.  Morning chest x-ray, CBC and electrolytes  4.  Will repeat lactic acid and blood gas.  5.  Meropenem and await culture     2.  Thrombocytopenia-as per hematology    3.  Cardiomyopathy-as per cardiology    4.  DVT prophylaxis-SCDs temporarily in patient with thrombocytopenia.    5.  Anemia-hemoglobin is 7.1.  Will likely give another unit of blood in this patient with respiratory failure.  Will check lactic acid again now.      Subjective:   Margaret Silverio is a(n) 78 year old female who is breathing more comfortably and is off PAP therapy    Objective:   Blood pressure 111/48, pulse 65, temperature 97.4 °F (36.3 °C), temperature source Temporal, resp. rate 18, weight 149 lb 14.6 oz (68 kg), SpO2 100%.    Physical Exam easily arousable black female  HEENT examination is unremarkable with pupils equal round and reactive to light and accommodation.   Neck  without adenopathy, thyromegaly, JVD nor bruit.   Lungs diminished with basilar crackles to auscultation and percussion.  Cardiac regular rate and rhythm 1 out of 6 systolic ejection murmur.   Abdomen nontender, without hepatosplenomegaly and no mass appreciable.   Extremities without clubbing cyanosis nor edema.   Neurologic grossly intact with symmetric tone and strength and reflex.  Skin without gross abnormality     Results:     Lab Results   Component Value Date    WBC 5.2 02/12/2024    HGB 7.1 02/12/2024    HCT 21.8 02/12/2024    PLT 27.0 02/12/2024    CREATSERUM 1.64 02/12/2024    BUN 35 02/12/2024     02/12/2024    K 4.2 02/12/2024     02/12/2024    CO2 26.0 02/12/2024     02/12/2024    CA 8.7 02/12/2024    ALB 3.0 02/12/2024    ALKPHO 147 02/12/2024    BILT 1.9 02/12/2024    TP 5.8 02/12/2024     02/12/2024    ALT 29 02/12/2024       Aroldo Pacheco MD  Medical Director, Critical Care, [facility]  Medical Director, [facility]  Pager: [phone]

## 2024-02-12 NOTE — PROGRESS NOTES
Hematology Oncology Progress Note    Patient Name: Margaret Silverio   YOB: 1945   Medical Record Number: [id number]   CSN: [id number]   Attending Physician: Briana Snyder MD     Subjective:  Patient seen in CCU with family at the bedside. Appears more alert and interactive.   BP and HR improved on dual inotrope.     Objective:  Vitals:  Vitals:    02/12/24 0923 02/12/24 1000 02/12/24 1100 02/12/24 1200   BP: 111/48 132/63 140/62 148/65   BP Location:  Right arm Right arm Right arm   Pulse: 65 65 66 69   Resp:  21 14 19   Temp:       TempSrc:       SpO2:  92% 91% 97%   Weight:           Current Medications:    Current Facility-Administered Medications:     bumetanide (Bumex) 0.25 MG/ML injection 1 mg, 1 mg, Intravenous, BID (Diuretic)    sodium chloride 0.9% infusion, , Intravenous, Once    DOBUTamine in dextrose 5% (Dobutrex) 500 mg/250mL infusion premix, 5 mcg/kg/min (Dosing Weight), Intravenous, Continuous    meropenem (Merrem) 500 mg in sodium chloride 0.9% 100 mL IVPB-MBP, 500 mg, Intravenous, q12h    milrinone in dextrose 5% (Primacor) 20 mg/100mL infusion premix, 0.25 mcg/kg/min, Intravenous, Continuous    amiodarone (Pacerone) tab 200 mg, 200 mg, Oral, Daily    [Held by provider] spironolactone (Aldactone) tab 25 mg, 25 mg, Oral, Daily    senna-docusate (Senokot-S) 8.6-50 MG per tab 2 tablet, 2 tablet, Oral, BID    traMADol (Ultram) tab 50 mg, 50 mg, Oral, Q6H PRN    polyethylene glycol (PEG 3350) (Miralax) 17 g oral packet 17 g, 17 g, Oral, Daily PRN    magnesium hydroxide (Milk of Magnesia) 400 MG/5ML oral suspension 30 mL, 30 mL, Oral, Daily PRN    bisacodyl (Dulcolax) 10 MG rectal suppository 10 mg, 10 mg, Rectal, Daily PRN    fleet enema (Fleet) 7-19 GM/118ML rectal enema 133 mL, 1 enema, Rectal, Once PRN    amitriptyline (Elavil) tab 25 mg, 25 mg, Oral, Nightly    sacubitril-valsartan (Entresto) 24-26 MG per tab 1 tablet, 1 tablet, Oral, BID    ferrous sulfate DR tab 325 mg, 325 mg,  Oral, Daily with breakfast    folic acid (Folvite) tab 800 mcg, 800 mcg, Oral, Daily    HYDROcodone-acetaminophen (Norco)  MG per tab 1 tablet, 1 tablet, Oral, Q6H PRN    pantoprazole (Protonix) DR tab 40 mg, 40 mg, Oral, BID AC    alendronate (Fosamax) tab 70 mg, 70 mg, Oral, Weekly    Physical Examination:  General: Lethargic, frail, oriented.   Chest: Clear to auscultation.  Heart: Irregular. + ICD in place.   Abdomen: Soft, non tender  Extremities: Pitting edema bilateral LE edema. +LUE edema  Neurological: Grossly intact.     Labs:  Recent Labs   Lab 02/10/24  1352 02/11/24  0514 02/12/24  0457   RBC 2.72* 2.44* 2.32*   HGB 8.6* 7.4* 7.1*   HCT 26.9* 22.7* 21.8*   MCV 98.9 93.0 94.0   MCH 31.6 30.3 30.6   MCHC 32.0 32.6 32.6   RDW 20.2* 20.1* 21.0*   NEPRELIM 4.42 5.93 4.80   WBC 5.2 6.6 5.2   PLT 18.0* 14.0* 27.0*     Recent Labs   Lab 02/09/24  0501 02/10/24  0745 02/10/24  1352 02/11/24  0514 02/12/24  0455   GLU 84   < > 90 128* 134*   BUN 17   < > 25* 33* 35*   CREATSERUM 1.11*   < > 1.66* 1.68* 1.64*   EGFRCR 51*   < > 32* 31* 32*   CA 8.4*   < > 8.7 8.5* 8.7   ALB 3.0*  --  3.7 3.3 3.0*      < > 137 137 140   K 3.7  3.7   < > 5.4* 5.0 4.2      < > 102 102 105   CO2 25.0   < > 16.0* 24.0 26.0   ALKPHO 153*  --  181*  --  147*   AST 21  --  59*  --  118*   ALT 11  --  16  --  29   BILT 1.9*  --  2.3*  --  1.9*   TP 6.0  --  7.1  --  5.8    < > = values in this interval not displayed.      Recent Labs   Lab 02/09/24  1458   INR 1.25*   PTT 35.2        Radiology:  XR CHEST AP PORTABLE  (CPT=71045)    Result Date: 2/12/2024  CONCLUSION:  1. Moderate cardiomegaly.  Pulmonary venous distention.  Heart valve prosthesis. 2. Moderate perihilar lower lobe mixed alveolar and interstitial airspace opacification with bilateral effusions suggesting pulmonary congestive changes.  Slight progression right lung base.    Dictated by (CST): Justin Stark MD on 2/12/2024 at 7:51 AM     Finalized by  (CST): Justin Stark MD on 2/12/2024 at 7:54 AM            Impression and Plan:  78 year old AAF with acute on chronic CHF, chronic anemia, hematology consulted for worsening pancytopenia similar to recent admission:       Thrombocytopenia  - severe acute thrombocytopenia, with normal count at baseline  - probably consumptive in the setting of acute heart failure, acute thrombosis, less likely immune mediated.   - work up with no evidence of hemolysis or DIC.  - continue supportive transfusion prn     Acute on chronic CHF  end-stage nonischemic cardiomyopathy lactic acidosis secondary to cardiogenic shock history of mitral valve replacement with bioprosthetic valve paroxysmal atrial fibrillation/flutter.   Management per cardiology, on dual inotrope     Left subclavian DVT  - nonocclusive DVT along the margin of ICD lead in the left subclavian vein  - may resume anticoagulation if plt count improves > 50 and safe from GI standpoint.  - pt was on chronic AC (eliquis) for A fib, stopped due to GIB     Acute on chronic anemia   - chronic multifactorial anemia; anemia of chronic disease, renal failure, drug induced (methotrexate), recent GI blood loss in the setting of anticoagulation  - prior BMBx was negative per patient- done in [place] few years ago. no records available.   - currently no sings or symptoms of active bleeding. Hgb relatively stable.   - consider watchman's given h/o GIB and high bleeding risk with anticoagulation.   - transfuse as needed to keep hgb => 8 due to cardiac disease.     Leukopenia  - mainly lymphopenia, likely drug induced vs autoimmune. Stable.   - may need repeat BMBx if cytopenias persist after her acute issues resolve       Briana Snyder MD   [facility]

## 2024-02-12 NOTE — PALLIATIVE CARE NOTE
[facility]  Palliative Care Progress Note    Margaret Silverio Patient Status:  Inpatient    3/14/1945 MRN [id number]   Location [facility] 3W/SW Attending Rosalinda Barry,    Hosp Day # 9 PCP Johnathon Rodriguez,      The  Cures Act makes medical notes like these available to patients in the interest of transparency. Please be advised this is a medical document. Medical documents are intended to carry relevant information, facts as evident, and the clinical opinion of the practitioner. The medical note is intended as peer to peer communication and may appear blunt or direct. It is written in medical language and may contain abbreviations or verbiage that are unfamiliar.       Subjective     Reviewed symptom medications past 24 hrs: none    Reviewed weekend notes-RRT called 2/10 for worsening dyspnea/respiratory failure requiring transfer to ICU for bipap and inotropic therapy for cardiogenic shock, repeat echo worsening EF now 15%    Patient was seen and examined in bed with no family present along with Dr. Barry. Pt is drowsy, but arouseable and able to answer simple questions. She appears very weak and ill. She denies any pain or dyspnea symptoms currently on nc 3L. Appetite is poor, denies abdominal pain, n/v and moved her bowels yesterday. She is on dual inotropic therapy with dobutamine and milrinone gtts per cardiology.     See summary of discussion below.     Review of Systems:  Limited ROS d/t pt factors above.    Allergies:  Allergies   Allergen Reactions    Lisinopril Coughing       Medications:     Current Facility-Administered Medications:     bumetanide (Bumex) 0.25 MG/ML injection 1 mg, 1 mg, Intravenous, BID (Diuretic)    sodium chloride 0.9% infusion, , Intravenous, Once    DOBUTamine in dextrose 5% (Dobutrex) 500 mg/250mL infusion premix, 5 mcg/kg/min (Dosing Weight), Intravenous, Continuous    meropenem (Merrem) 500 mg in sodium chloride 0.9% 100 mL  IVPB-MBP, 500 mg, Intravenous, q12h    milrinone in dextrose 5% (Primacor) 20 mg/100mL infusion premix, 0.25 mcg/kg/min, Intravenous, Continuous    amiodarone (Pacerone) tab 200 mg, 200 mg, Oral, Daily    [Held by provider] spironolactone (Aldactone) tab 25 mg, 25 mg, Oral, Daily    senna-docusate (Senokot-S) 8.6-50 MG per tab 2 tablet, 2 tablet, Oral, BID    traMADol (Ultram) tab 50 mg, 50 mg, Oral, Q6H PRN    polyethylene glycol (PEG 3350) (Miralax) 17 g oral packet 17 g, 17 g, Oral, Daily PRN    magnesium hydroxide (Milk of Magnesia) 400 MG/5ML oral suspension 30 mL, 30 mL, Oral, Daily PRN    bisacodyl (Dulcolax) 10 MG rectal suppository 10 mg, 10 mg, Rectal, Daily PRN    fleet enema (Fleet) 7-19 GM/118ML rectal enema 133 mL, 1 enema, Rectal, Once PRN    amitriptyline (Elavil) tab 25 mg, 25 mg, Oral, Nightly    sacubitril-valsartan (Entresto) 24-26 MG per tab 1 tablet, 1 tablet, Oral, BID    ferrous sulfate DR tab 325 mg, 325 mg, Oral, Daily with breakfast    folic acid (Folvite) tab 800 mcg, 800 mcg, Oral, Daily    HYDROcodone-acetaminophen (Norco)  MG per tab 1 tablet, 1 tablet, Oral, Q6H PRN    pantoprazole (Protonix) DR tab 40 mg, 40 mg, Oral, BID AC    alendronate (Fosamax) tab 70 mg, 70 mg, Oral, Weekly    Objective     Vital Signs:  Blood pressure 111/48, pulse 65, temperature 97.4 °F (36.3 °C), temperature source Temporal, resp. rate 18, weight 149 lb 14.6 oz (68 kg), SpO2 100%.  Body mass index is 24.95 kg/m².  Present Level of pain: 0/10  Non-verbal signs of pain present: No    Physical Exam:  General: Drowsy and in no apparent distress. Weak appearing  HEENT: No focal deficits.  Cardiac: Regular rate and rhythm, S1, S2 normal, no murmur, rub or gallop.  Lungs: Clear without wheezes, rales, rhonchi or dullness.  Normal excursions and effort.  Abdomen: Soft, non-tender, normal bowel sounds X 4 quadrants, no rebound or guarding  Extremities: Without clubbing, cyanosis. Peripheral pulses are 2+. BLE  Edema not present, severe hand deformities r/t RA  Neurologic: Drowsy, able to answer simple questions  Skin: Warm and dry.    Prior to admission Palliative performance scale PPSv2 (%): 60    Current PPS 30%    Hematology:  Lab Results   Component Value Date    WBC 5.2 02/12/2024    HGB 7.1 (L) 02/12/2024    HCT 21.8 (L) 02/12/2024    PLT 27.0 (L) 02/12/2024       Coags:  Lab Results   Component Value Date    INR 1.25 (H) 02/09/2024    PTT 35.2 02/09/2024       Chemistry:  Lab Results   Component Value Date    CREATSERUM 1.64 (H) 02/12/2024    BUN 35 (H) 02/12/2024     02/12/2024    K 4.2 02/12/2024     02/12/2024    CO2 26.0 02/12/2024     (H) 02/12/2024    CA 8.7 02/12/2024    ALB 3.0 (L) 02/12/2024    ALKPHO 147 (H) 02/12/2024    BILT 1.9 (H) 02/12/2024    TP 5.8 02/12/2024     (H) 02/12/2024    ALT 29 02/12/2024    MG 2.0 02/07/2024    PHOS 2.7 02/09/2024       Imaging:  XR CHEST AP PORTABLE  (CPT=71045)    Result Date: 2/12/2024  CONCLUSION:  1. Moderate cardiomegaly.  Pulmonary venous distention.  Heart valve prosthesis. 2. Moderate perihilar lower lobe mixed alveolar and interstitial airspace opacification with bilateral effusions suggesting pulmonary congestive changes.  Slight progression right lung base.    Dictated by (CST): Justin Stark MD on 2/12/2024 at 7:51 AM     Finalized by (CST): Justin Stark MD on 2/12/2024 at 7:54 AM          XR CHEST AP PORTABLE  (CPT=71045)    Result Date: 2/11/2024  CONCLUSION: No change    Dictated by (CST): Rey Jackson MD on 2/11/2024 at 9:11 AM     Finalized by (CST): Rey Jackson MD on 2/11/2024 at 9:11 AM          US VENOUS DOPPLER LEG BILAT - DIAG IMG (CPT=93970)    Result Date: 2/10/2024  CONCLUSION: No DVT    Dictated by (CST): Rey Jackson MD on 2/10/2024 at 3:25 PM     Finalized by (CST): Rey Jackson MD on 2/10/2024 at 3:26 PM          XR CHEST AP PORTABLE  (CPT=71045)    Result Date: 2/10/2024  CONCLUSION: No change     Dictated by (CST): Rey Jackson MD on 2/10/2024 at 1:05 PM     Finalized by (CST): Rey Jackson MD on 2/10/2024 at 1:06 PM           2/10/24 Echocardiogram  Conclusions:     1. Left ventricle: The cavity size was normal. Wall thickness was normal.      Systolic function was normal. The estimated ejection fraction was 15-20%,      by biplane method of disks. Doppler parameters are consistent with an      irreversible restrictive pattern, indicative of decreased left      ventricular diastolic compliance and/or increased left atrial pressure -      grade 4 diastolic dysfunction.   2. Right ventricle: The cavity size was markedly increased. Pacer wire noted      in the right ventricle. Although right ventricular systolic pressure was      not increased relative to right atrial pressure, the right atrial      pressure would appear to be markedly elevated as a result of wide-open      tricuspid regurgitation. Absolute RV systolic pressure was, therefore, at      least moderately increased.   3. Ventricular septum: Septal motion was dyssynergic.   4. Left atrium: The atrium was increased in size. Echodensity seen in LA.   5. Right atrium: The atrium was markedly dilated. Pacer wire noted in right      atrium.   6. Mitral valve: A bioprosthesis is present and functioning normally.   7. Tricuspid valve: There was malcoaptation of the valve leaflets. There was      wide-open regurgitation   Follow up GOC Discussion    2/12/24-Provided clinical update to pt. Talked with her about worsening heart failure/cardiogenic shock/GEORGE. We addressed her code status and pt wants to remain full code and continue with supportive care.    Dr. Barry and I also called her dtr Barbi who is POA. We provided clinical overview and deterioration over the weekend. We stressed pt's overall poor prognosis and discussed end stage CHF with her. Discussed pt would not be dialysis candidate if renal function worsens. Barbi understands how ill pt  is currently and she will be coming in to see her later today to have more discussions with her mother. Encouraged setting limits towards DNAR. Will continue supportive care and pt/family will need ongoing discussions through clinical course.     2/9/24-I met with pt and her family including dtr Barbi and  Low. I provided clinical overview. I also reinforced benefits of palliative care. I discussed the normal disease trajectory of CHF, concerns with ongoing pancytopenia/thrombocytopenia, DVT off A/C d/t thrombocytopenia, recent GIB, AFib with recommendation for w/u for watchman as outpt. She understands she will be at risk for recurrent hospitalizations and would be ok with being re-hospitalized again. Pt wants to continue with supportive care and we discussed plan for CÉSAR on dc. I recommended having a community palliative care program follow on dc which they seem open to. Discussed ongoing GOC discussions will be needed over time.     I also readdressed the importance of ACP prior to crisis. Pt was agreeable to talking about advance directives today. I reviewed HCPOA paperwork with pt in detail. She expressed wanting her dtr Barbi to be to be her primary HCPOA and  as secondary POA. HCPOA paperwork completed with pt. I also addressed her full code status and discussed the risks vs benefits of life sustaining treatments in the setting of her clinical picture and encouraged setting limits. Pt expressed wanting to remain full code. She tells us she would not want prolonged heroic measures taken or if she was in vegetative state, but would want resuscitation attempted. I provided blank POLST form for their reference for the future.     Provided emotional support to pt/family who are coping adequately.       Discussed with Patient and Family: Yes  Patient's preference about sharing medical information: speak to pt and family  Patient's decision making preferences: speak to Pt  Code status: Full  code  Have advanced directives been discussed with patient or healthcare power of : Yes        Healthcare Agent Appointed: Yes  Healthcare Agent's Name: Barbi Silverio  Healthcare Agent's Phone Number: [phone]          Spiritual needs addressed: Patient/family declined Spiritual Care    Palliative disposition: Community Palliative Care        Palliative Care Assessment and Recommendations     Problem List:     Acute on chronic combined end stage CHF EF 15%  Cardiogenic shock  Severe TVR  GEORGE  Bioprosthetic mitral valve  L subclavian DVT  Pacemaker/AICD  Pancytopenia/worsening thrombocytopenia  Pafib  Recent GIB  CKD  Rheumatoid arthritis  Weakness     Chronic pain r/t RA  -Controlled, stable  -Continue home Norco 10/325 mg 1 tab po Q 6 hrs prn severe pain  -Continue alternating with Tramadol 50 mg po Q 6 hrs prn mod pain     Constipation  -Resolved, moved bowels 2/11  -Continue Senna S 2 tabs BID.  -Continue Miralax prn and ducolax supp prn  -Education provided on adequately bowel regimen while taking opioids.      Goals of care counseling  -see above for details  -Confirmed wishes for full code and continue supportive care. Encouraged pt/family to consider setting limits towards DNAR.  -Pt/dtr Barbi are aware of poor prognosis and she is not candidate for dialysis if renal function worsens.  -Ongoing GOC discussions will be needed through clinical course.  -Agreeable to palliative care following  -Dispo:  TBD pending clinical course. If improves, CÉSAR with [facility] to follow on dc. SW to help with dc planning.   -Provided emotional support to pt/family who are coping adequately.     Advance care planning  -Pt's dtr Barbi Silverio is primary HCPOA #[phone].  -Previously completed HCPOA paperwork in EPIC.  -Provided copy of [place] form for review.      Palliative Performance Scale 30%     Emotion support provided to patient/family today: Yes       A total of  25 mins were spent on this consult, which included all of the following: direct face to face contact, history taking, physical examination, and >50% was spent counseling and coordinating care.    Discussed today's visit with Dr. Barry and Michael GIL.    I will continue to follow clinically.     Aparna Mahan, ANP-BC, [facility] [id number]  2/12/2024  11:08 AM  Palliative Care Services

## 2024-02-12 NOTE — OCCUPATIONAL THERAPY NOTE
OCCUPATIONAL THERAPY TREATMENT NOTE - INPATIENT        Room Number: 203/203-A     Presenting Problem: acute on chronic CHF    Problem List  Principal Problem:    Acute on chronic congestive heart failure, unspecified heart failure type (HCC)  Active Problems:    Pancytopenia (HCC)    Goals of care, counseling/discussion    Advance care planning    Palliative care by specialist    Acute deep vein thrombosis (DVT) of left upper extremity (HCC)    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HCC)    Cardiogenic shock (HCC)    HFrEF (heart failure with reduced ejection fraction) (HCC)    Anemia      OCCUPATIONAL THERAPY ASSESSMENT   Patient demonstrates limited progress this session, goals remain in progress.    Patient continues to function below baseline with bed level ADLs and mobility.   Contributing factors to remaining limitations include decreased functional strength, decreased functional reach, decreased endurance, and fatigue .  Next session anticipate patient to progress toileting, upper body dressing, lower body dressing, bed mobility, and stating sitting balance.  Occupational Therapy will continue to follow patient for duration of hospitalization.    Patient continues to benefit from continued skilled OT services: to promote return to prior level of function and safety with continuous assistance and gradual rehabilitative therapy .     PLAN  OT Treatment Plan: Balance activities;Energy conservation/work simplification techniques;ADL training;Functional transfer training;UE strengthening/ROM;Endurance training;Patient/Family education;Patient/Family training;Equipment eval/education;Compensatory technique education  OT Device Recommendations: Shower chair; Raised toilet seat (built up utensils)    SUBJECTIVE  \"I'm so cold\"    OBJECTIVE  Precautions: Bed/chair alarm    WEIGHT BEARING RESTRICTION     PAIN ASSESSMENT  Rating: -- (not rated)  Location: L UE  Management Techniques: Activity promotion; Repositioning; Nurse  notified    ACTIVITY TOLERANCE  Pulse: 65        BP: 111/48             O2 SATURATIONS  Oxygen Therapy  SPO2% on Oxygen at Rest: 99  At rest oxygen flow (liters per minute): 3    ACTIVITIES OF DAILY LIVING ASSESSMENT  AM-PAC ‘6-Clicks’ Inpatient Daily Activity Short Form  How much help from another person does the patient currently need…  -   Putting on and taking off regular lower body clothing?: Total  -   Bathing (including washing, rinsing, drying)?: A Lot  -   Toileting, which includes using toilet, bedpan or urinal? : Total  -   Putting on and taking off regular upper body clothing?: A Lot  -   Taking care of personal grooming such as brushing teeth?: A Lot  -   Eating meals?: A Lot    AM-PAC Score:  Score: 10  Approx Degree of Impairment: 74.7%  Standardized Score (AM-PAC Scale): 27.31  CMS Modifier (G-Code): CL    FUNCTIONAL TRANSFER ASSESSMENT     BED MOBILITY  Rolling: Maximum Assist  Supine to Sit : Maximum Assist    BALANCE ASSESSMENT  Static Sitting: Contact Guard Assist    FUNCTIONAL ADL ASSESSMENT  Eating: Moderate Assist  Grooming Seated: Moderate Assist  Bathing Seated: Maximum Assist  UB Dressing Seated: Maximum Assist  LB Dressing Seated: Dependent  Toileting Seated: Dependent    Skilled Therapy Provided: RN cleared pt for participation in occupational therapy session, which was completed in collaboration with physical therapy. Upon arrival, pt was supine in bed. No family present during session. Pt benefited from verbal cues to maximize participation.  Pt with slowed progress towards IP OT goals -- pt reported significant fatigue and feeling cold. Pt declined out of bed activity , but was agreeable for repositioning -- pillow for offloading. Ue elevated on pillows for swelling management  , encouraged pt to move extremities as much as able.     EDUCATION PROVIDED  Patient: Plan of Care; Role of Occupational Therapy; Discharge Recommendations; Functional Transfer Techniques; Fall Prevention;  Posture/Positioning; Proper Body Mechanics  Patient's Response to Education: Verbalized Understanding; Returned Demonstration; Requires Further Education      Patient End of Session: In bed;Needs met;Call light within reach;RN aware of session/findings    OT Goals:  Patient's self stated goal is: did not state       Patient will complete functional transfer with min a   Comment: ongoing    Patient will complete toileting with mod a   Comment: ongoing    Patient will tolerate standing for 1-2 minutes in prep for adls with min a   Comment: ongoing            Goals  on:   Frequency: 3x week    OT Session Time: 8 minutes  Therapeutic Activity: 8 minutes

## 2024-02-12 NOTE — PROGRESS NOTES
[facility] - CARDIOLOGY PROGRESS NOTE  Margaret Silverio Patient Status:  Inpatient    3/14/1945 MRN [id number]   Location [facility] 2W/SW Attending Rosalinda Barry,    Hosp Day # 9 PCP Johnathon Rodriguez,      Assessment:    Lactic acidosis: Likely due to cardiogenic shock, respiratory status is improved since starting dobutamine.  Echocardiogram with severely reduced ejection fraction.  Now on dual inotropes in hopes of improved perfusion.  Lactic acid levels improving, though still elevated.     HFrEF: End-stage nonischemic cardiomyopathy with widened QRS, RV paced.  She will eventually have BiV upgrade however likely not in acute setting currently.     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     Severe TR, wide open regurgitation.  Only fix would be surgery which she is not a candidate for at this time.     Paroxysmal Afib/Flutter:  [place] DC-ICD 2023, Rate controlled, VVI paced  A/c on hold with Hx GIB and thrombocytopenia, Watchman eval was in progress outpatient     Severe MR s/p Bioprosthetic Mitral Valve.  Valve functioning normally.    Severe anemia, thrombocytopenia.    Other problems:  Thrombocytopenia thought to be immune related, not responding to IV steroids.  Has not gotten IVIG.  Hx Recurrent GIB: EGD/ CLN on 24 with small transverse colon AVM, s/p PRBC, FFP   LUE Swelling: US with nonocclusive DVT along posterior margin of ICD lead in left subclavian vein  GEORGE on CKD due to underperfusion.  Oliguric despite inotropic therapy and adequate blood pressure.  Hx of possible TIA/ Seizures 2022  Abnormal TSH- TSH 10.681, normal T4      Plan:    Continue full support today with dual inotropes.    Tolerating Entresto.  Spironolactone on hold.  Carvedilol on hold with dobutamine infusion.    Will resume diuresis.        Subjective:  No  chest pain or shortness of breath.    Objective:  /45 (BP Location: Right arm)   Pulse 65   Temp 98.2 °F (36.8 °C) (Temporal)   Resp 18   Wt 148 lb 13 oz (67.5 kg)   SpO2 95%   BMI 24.76 kg/m²     Temp (24hrs), Av.1 °F (36.7 °C), Min:98 °F (36.7 °C), Max:98.2 °F (36.8 °C)      Intake/Output:    Intake/Output Summary (Last 24 hours) at 2024 0840  Last data filed at 2024 0400  Gross per 24 hour   Intake 656.5 ml   Output 200 ml   Net 456.5 ml       Wt Readings from Last 2 Encounters:   24 148 lb 13 oz (67.5 kg)   24 153 lb 11.2 oz (69.7 kg)       Physical Exam:    General: Alert and oriented x 3,  No apparent distress.  Appears tired.  HEENT: No focal deficits.  Neck: Marked JVD, carotids 2+ no bruits.  Cardiac: Regular rate and rhythm, S1, S2 normal, 3/6 HSM  Lungs: Clear anterolaterally without wheezes, rales, rhonchi.  Normal excursions and effort.  Abdomen: Soft, non-tender.   Extremities: Without clubbing, cyanosis or edema.  Peripheral pulses are diminished  Skin: Warm and dry.     Labs:  Lab Results   Component Value Date    WBC 5.2 2024    HGB 7.1 2024    HCT 21.8 2024    PLT 27.0 2024     Lab Results   Component Value Date    INR 1.25 (H) 2024     Lab Results   Component Value Date     2024    K 4.2 2024     2024    CO2 26.0 2024    BUN 35 2024    CREATSERUM 1.64 2024     2024    CA 8.7 2024    ALT 29 2024     2024    ALB 3.0 2024        No results found for: \"TROP\", \"CKMB\"     Medications:     sodium chloride   Intravenous Once    dexamethasone  40 mg Oral Once    meropenem  500 mg Intravenous q12h    amiodarone  200 mg Oral Daily    [Held by provider] spironolactone  25 mg Oral Daily    senna-docusate  2 tablet Oral BID    [Held by provider] furosemide  40 mg Oral Daily    amitriptyline  25 mg Oral Nightly    sacubitril-valsartan  1 tablet Oral BID     ferrous sulfate  325 mg Oral Daily with breakfast    folic acid  800 mcg Oral Daily    pantoprazole  40 mg Oral BID AC    alendronate  70 mg Oral Weekly      DOBUTamine 5 mcg/kg/min (02/11/24 1549)    milrinone in dextrose 5% 0.25 mcg/kg/min (02/12/24 0545)       Theodore Lr MD  2/12/2024  8:40 AM

## 2024-02-12 NOTE — PHYSICAL THERAPY NOTE
PHYSICAL THERAPY TREATMENT NOTE - INPATIENT     Room Number: 203/203-A       Presenting Problem: acute on chronic CHF, weakness and shortness of breath. Fell out of bed.  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    Patient continues to function below baseline with bed mobility, transfers, gait, maintaining seated position, and standing prolonged periods.  Contributing factors to remaining limitations include decreased functional strength, decreased endurance/aerobic capacity, impaired sitting and standing balance, decreased muscular endurance, medical status, and increased O2 needs from baseline.  Next session anticipate patient to progress bed mobility and transfers.  Physical Therapy will continue to follow patient for duration of hospitalization.    Patient continues to benefit from continued skilled PT services: to promote return to prior level of function and safety with continuous assistance and gradual rehabilitative therapy .    PLAN  PT Treatment Plan: Bed mobility;Body mechanics;Coordination;Endurance;Patient education;Gait training;Strengthening;Transfer training;Balance training  Frequency (Obs): 3-5x/week    SUBJECTIVE  \"I just want to sleep.\"    OBJECTIVE  Precautions: Bed/chair alarm    PAIN ASSESSMENT   Ratin  Location: L UE  Management Techniques: Activity promotion;Body mechanics;Relaxation;Repositioning    BALANCE  Static Sitting: Not  tested  Dynamic Sitting: Not tested  Static Standing: Not tested  Dynamic Standing: Not tested    ACTIVITY TOLERANCE  Pulse: 79  Heart Rate Source: Monitor     BP: 111/48  BP Location: Right arm  BP Method: Automatic  Patient Position: Lying     O2 WALK  Oxygen Therapy  SPO2% on Oxygen at Rest: 99  At rest oxygen flow (liters per minute): 3    AM-PAC '6-Clicks' INPATIENT SHORT FORM - BASIC MOBILITY  How much difficulty does the patient currently have...  Patient Difficulty: Turning over in bed (including adjusting bedclothes, sheets and blankets)?: A Lot   Patient Difficulty: Sitting down on and standing up from a chair with arms (e.g., wheelchair, bedside commode, etc.): A Lot   Patient Difficulty: Moving from lying on back to sitting on the side of the bed?: A Lot   How much help from another person does the patient currently need...   Help from Another: Moving to and from a bed to a chair (including a wheelchair)?: A Lot   Help from Another: Need to walk in hospital room?: A Lot   Help from Another: Climbing 3-5 steps with a railing?: Total     AM-PAC Score:  Raw Score: 11   Approx Degree of Impairment: 72.57%   Standardized Score (AM-PAC Scale): 33.86   CMS Modifier (G-Code): CL    FUNCTIONAL ABILITY STATUS  Functional Mobility/Gait Assessment  Gait Assistance: Not tested  Rolling: maximum assist x 2 (dependent) rolling left and right and boosting towards HOB for re-positioning     Additional information: Patient in bed, declining to sit EOB or transfer out of bed this session. Agreeable to bed mobility/re-positioning in order to place clean bed pads and pillows under patient for off-loading.    The patient's Approx Degree of Impairment: 72.57% has been calculated based on documentation in the [facility] '6 clicks' Inpatient Daily Activity Short Form.  Research supports that patients with this level of impairment may benefit from rehab.  Final disposition will be made by interdisciplinary medical team.    Patient in  bed upon arrival. RN approved activity. Educated patient on POC and benefits of mobilization. Agreeable to participate. Patient reporting no pain.  Patient End of Session: In bed;Needs met;Call light within reach;RN aware of session/findings;All patient questions and concerns addressed    CURRENT GOALS   Goals to be met by: 2/17/24  Patient Goal Patient's self-stated goal is: return to PLOF   Goal #1 Patient is able to demonstrate supine - sit EOB @ level: CGA      Goal #1   Current Status Max A x 2   Goal #2 Patient is able to demonstrate transfers EOB to/from Chair/Wheelchair at assistance level: minimum assistance with walker - rolling      Goal #2  Current Status  NT   Goal #3 Patient is able to ambulate 25 feet with assist device: walker - rolling at assistance level: minimum assistance   Goal #3   Current Status  NT   Goal #4 Patient to demonstrate independence with home activity/exercise instructions provided to patient in preparation for discharge.   Goal #4   Current Status  Ongoing     Therapeutic Activity: 9 minutes

## 2024-02-13 ENCOUNTER — TELEPHONE (OUTPATIENT)
Dept: FAMILY MEDICINE CLINIC | Facility: CLINIC | Age: 79
End: 2024-02-13

## 2024-02-13 ENCOUNTER — APPOINTMENT (OUTPATIENT)
Dept: GENERAL RADIOLOGY | Facility: HOSPITAL | Age: 79
End: 2024-02-13
Attending: INTERNAL MEDICINE
Payer: MEDICARE

## 2024-02-13 LAB
ANION GAP SERPL CALC-SCNC: 10 MMOL/L (ref 0–18)
ANTIBODY SCREEN: POSITIVE
BASOPHILS # BLD AUTO: 0 X10(3) UL (ref 0–0.2)
BASOPHILS NFR BLD AUTO: 0 %
BLOOD TYPE BARCODE: 6200
BUN BLD-MCNC: 29 MG/DL (ref 9–23)
BUN/CREAT SERPL: 18 (ref 10–20)
C ANTIGEN: POSITIVE
CALCIUM BLD-MCNC: 8.8 MG/DL (ref 8.7–10.4)
CHLORIDE SERPL-SCNC: 103 MMOL/L (ref 98–112)
CO2 SERPL-SCNC: 24 MMOL/L (ref 21–32)
CREAT BLD-MCNC: 1.61 MG/DL
DEPRECATED RDW RBC AUTO: 58.5 FL (ref 35.1–46.3)
DIRECT COOMBS POLY: NEGATIVE
EGFRCR SERPLBLD CKD-EPI 2021: 33 ML/MIN/1.73M2 (ref 60–?)
ELUATE RESULT: NEGATIVE
EOSINOPHIL # BLD AUTO: 0 X10(3) UL (ref 0–0.7)
EOSINOPHIL NFR BLD AUTO: 0 %
ERYTHROCYTE [DISTWIDTH] IN BLOOD BY AUTOMATED COUNT: 21.2 % (ref 11–15)
GLUCOSE BLD-MCNC: 128 MG/DL (ref 70–99)
HCT VFR BLD AUTO: 21.9 %
HGB BLD-MCNC: 7.2 G/DL
IMM GRANULOCYTES # BLD AUTO: 0.04 X10(3) UL (ref 0–1)
IMM GRANULOCYTES NFR BLD: 0.9 %
IRON SATN MFR SERPL: 44 %
IRON SERPL-MCNC: 89 UG/DL
LYMPHOCYTES # BLD AUTO: 0.27 X10(3) UL (ref 1–4)
LYMPHOCYTES NFR BLD AUTO: 6.4 %
MCH RBC QN AUTO: 30.8 PG (ref 26–34)
MCHC RBC AUTO-ENTMCNC: 32.9 G/DL (ref 31–37)
MCV RBC AUTO: 93.6 FL
MONOCYTES # BLD AUTO: 0.23 X10(3) UL (ref 0.1–1)
MONOCYTES NFR BLD AUTO: 5.4 %
NEUTROPHILS # BLD AUTO: 3.71 X10 (3) UL (ref 1.5–7.7)
NEUTROPHILS # BLD AUTO: 3.71 X10(3) UL (ref 1.5–7.7)
NEUTROPHILS NFR BLD AUTO: 87.3 %
OSMOLALITY SERPL CALC.SUM OF ELEC: 291 MOSM/KG (ref 275–295)
PLATELET # BLD AUTO: 44 10(3)UL (ref 150–450)
POTASSIUM SERPL-SCNC: 3.5 MMOL/L (ref 3.5–5.1)
RBC # BLD AUTO: 2.34 X10(6)UL
RGTSCRN: 20
RH BLOOD TYPE: POSITIVE
SODIUM SERPL-SCNC: 137 MMOL/L (ref 136–145)
TIBC SERPL-MCNC: 204 UG/DL (ref 250–425)
TRANSFERRIN SERPL-MCNC: 137 MG/DL (ref 250–380)
UNIT VOLUME: 350 ML
WBC # BLD AUTO: 4.3 X10(3) UL (ref 4–11)

## 2024-02-13 PROCEDURE — 99233 SBSQ HOSP IP/OBS HIGH 50: CPT | Performed by: INTERNAL MEDICINE

## 2024-02-13 PROCEDURE — 99232 SBSQ HOSP IP/OBS MODERATE 35: CPT | Performed by: REGISTERED NURSE

## 2024-02-13 PROCEDURE — 99232 SBSQ HOSP IP/OBS MODERATE 35: CPT | Performed by: INTERNAL MEDICINE

## 2024-02-13 PROCEDURE — 71045 X-RAY EXAM CHEST 1 VIEW: CPT | Performed by: INTERNAL MEDICINE

## 2024-02-13 PROCEDURE — 30233N1 TRANSFUSION OF NONAUTOLOGOUS RED BLOOD CELLS INTO PERIPHERAL VEIN, PERCUTANEOUS APPROACH: ICD-10-PCS | Performed by: INTERNAL MEDICINE

## 2024-02-13 PROCEDURE — 99233 SBSQ HOSP IP/OBS HIGH 50: CPT | Performed by: HOSPITALIST

## 2024-02-13 RX ORDER — SODIUM CHLORIDE 9 MG/ML
INJECTION, SOLUTION INTRAVENOUS ONCE
Status: DISCONTINUED | OUTPATIENT
Start: 2024-02-13 | End: 2024-02-14

## 2024-02-13 RX ORDER — SENNA AND DOCUSATE SODIUM 50; 8.6 MG/1; MG/1
2 TABLET, FILM COATED ORAL DAILY
Status: DISCONTINUED | OUTPATIENT
Start: 2024-02-14 | End: 2024-02-18

## 2024-02-13 RX ORDER — ISOSORBIDE DINITRATE 5 MG/1
5 TABLET ORAL
Status: DISCONTINUED | OUTPATIENT
Start: 2024-02-13 | End: 2024-02-15

## 2024-02-13 RX ORDER — HYDRALAZINE HYDROCHLORIDE 10 MG/1
10 TABLET, FILM COATED ORAL EVERY 8 HOURS SCHEDULED
Status: DISCONTINUED | OUTPATIENT
Start: 2024-02-13 | End: 2024-02-14

## 2024-02-13 NOTE — PLAN OF CARE
Ms. Robles rested off and on overnight. Her safety was ensured with keeping the call light near to her hand and frequent rounding.     Problem: Patient Centered Care  Goal: Patient preferences are identified and integrated in the patient's plan of care  Description: Interventions:  - What would you like us to know as we care for you? I live with my  and two grandchildren  - Provide timely, complete, and accurate information to patient/family  - Incorporate patient and family knowledge, values, beliefs, and cultural backgrounds into the planning and delivery of care  - Encourage patient/family to participate in care and decision-making at the level they choose  - Honor patient and family perspectives and choices  Outcome: Progressing     Problem: Patient/Family Goals  Goal: Patient/Family Long Term Goal  Description: Patient's Long Term Goal: To get better    Interventions:  - IV diuretics, prn O2 supplementation, cardiac monitoring and electrolyte protocol  - See additional Care Plan goals for specific interventions  Outcome: Not Progressing  Goal: Patient/Family Short Term Goal  Description: Patient's Short Term Goal: To breathe better    Interventions:   - IV diuretics, O2 supplementation prn, cardiac monitoring, electrolyte protocol  - See additional Care Plan goals for specific interventions  Outcome: Progressing     Problem: CARDIOVASCULAR - ADULT  Goal: Maintains optimal cardiac output and hemodynamic stability  Description: INTERVENTIONS:  - Monitor vital signs, rhythm, and trends  - Monitor for bleeding, hypotension and signs of decreased cardiac output  - Evaluate effectiveness of vasoactive medications to optimize hemodynamic stability  - Monitor arterial and/or venous puncture sites for bleeding and/or hematoma  - Assess quality of pulses, skin color and temperature  - Assess for signs of decreased coronary artery perfusion - ex. Angina  - Evaluate fluid balance, assess for edema, trend  weights  Outcome: Not Progressing  Goal: Absence of cardiac arrhythmias or at baseline  Description: INTERVENTIONS:  - Continuous cardiac monitoring, monitor vital signs, obtain 12 lead EKG if indicated  - Evaluate effectiveness of antiarrhythmic and heart rate control medications as ordered  - Initiate emergency measures for life threatening arrhythmias  - Monitor electrolytes and administer replacement therapy as ordered  Outcome: Progressing     Problem: RESPIRATORY - ADULT  Goal: Achieves optimal ventilation and oxygenation  Description: INTERVENTIONS:  - Assess for changes in respiratory status  - Assess for changes in mentation and behavior  - Position to facilitate oxygenation and minimize respiratory effort  - Oxygen supplementation based on oxygen saturation or ABGs  - Provide Smoking Cessation handout, if applicable  - Encourage broncho-pulmonary hygiene including cough, deep breathe, Incentive Spirometry  - Assess the need for suctioning and perform as needed  - Assess and instruct to report SOB or any respiratory difficulty  - Respiratory Therapy support as indicated  - Manage/alleviate anxiety  - Monitor for signs/symptoms of CO2 retention  Outcome: Progressing     Problem: GENITOURINARY - ADULT  Goal: Absence of urinary retention  Description: INTERVENTIONS:  - Assess patient’s ability to void and empty bladder  - Monitor intake/output and perform bladder scan as needed  - Follow urinary retention protocol/standard of care  - Consider collaborating with pharmacy to review patient's medication profile  - Implement strategies to promote bladder emptying  Outcome: Progressing     Problem: Impaired Functional Mobility  Goal: Achieve highest/safest level of mobility/gait  Description: Interventions:  - Assess patient's functional ability and stability  - Promote increasing activity/tolerance for mobility and gait  - Educate and engage patient/family in tolerated activity level and precautions  - Recommend  patient transfer to bedside chair toward strongest side  - Recommend use of chair position in bed 3 times per day  Outcome: Not Progressing

## 2024-02-13 NOTE — PROGRESS NOTES
[facility]    Progress Note    Margaret Silverio Patient Status:  Inpatient    3/14/1945 MRN [id number]   Location [facility] 3W/SW Attending Rosalinda Barry DO   Hosp Day # 10 PCP Johnathon Rodriguez DO     Chief complaint chf     Subjective:   Margaret Silverio is a(n) 78 year old female who feels great today. Denies chest pain and sob.       Objective:   Blood pressure 152/64, pulse 67, temperature 98.3 °F (36.8 °C), temperature source Temporal, resp. rate 14, weight 158 lb 11.7 oz (72 kg), SpO2 100%.      Intake/Output Summary (Last 24 hours) at 2024 1128  Last data filed at 2024 0910  Gross per 24 hour   Intake 728.8 ml   Output 1050 ml   Net -321.2 ml       Patient Weight(s) for the past 336 hrs:   Weight   24 0600 158 lb 11.7 oz (72 kg)   24 0800 149 lb 14.6 oz (68 kg)   24 0000 148 lb 13 oz (67.5 kg)   24 0800 147 lb 11.3 oz (67 kg)   24 0503 147 lb 14.4 oz (67.1 kg)   24 0434 149 lb (67.6 kg)   24 0457 149 lb 8 oz (67.8 kg)   24 1108 150 lb 14.4 oz (68.4 kg)   24 0400 149 lb 6.4 oz (67.8 kg)   24 0635 148 lb 14.4 oz (67.5 kg)   24 0453 149 lb 9.6 oz (67.9 kg)   24 0032 149 lb 14.4 oz (68 kg)   24 135 lb (61.2 kg)           General appearance: Alert and oriented x person and place   Pulmonary:  CTA , decreased at bases   Cardiovascular: S1, S2 normal, no murmur, click, rub or gallop, regular rate and rhythm  Abdominal: soft, non-tender; bowel sounds normal; no masses,  no organomegaly  Extremities: extremities normal, atraumatic, no cyanosis or edema        Medicines:           Lab Results   Component Value Date    WBC 4.3 2024    HGB 7.2 (L) 2024    HCT 21.9 (L) 2024    PLT 44.0 (L) 2024    CREATSERUM 1.61 (H) 2024    BUN 29 (H) 2024     2024    K 3.5 2024     2024    CO2 24.0 2024     (H) 2024    CA  8.8 02/13/2024    ALB 3.0 (L) 02/12/2024    ALKPHO 147 (H) 02/12/2024    BILT 1.9 (H) 02/12/2024    TP 5.8 02/12/2024     (H) 02/12/2024    ALT 29 02/12/2024    PTT 35.2 02/09/2024    INR 1.25 (H) 02/09/2024    T4F 1.2 02/03/2024    TSH 10.681 (H) 02/03/2024    LIP 32 01/13/2024    MG 2.0 02/07/2024    PHOS 2.7 02/09/2024    B12 >2,000 (H) 02/03/2024       XR CHEST AP PORTABLE  (CPT=71045)    Result Date: 2/13/2024  CONCLUSION:  1. Essentially unchanged chest with redemonstration of moderate cardiomegaly and mild pulmonary congestive changes about the same.  Persistent bibasilar opacification suggesting probable pleural effusions, but I cannot exclude underlying bibasilar pneumonia and or atelectasis.  Findings are about the same.    Dictated by (CST): Josue Hogan MD on 2/13/2024 at 9:44 AM     Finalized by (CST): Josue Hogan MD on 2/13/2024 at 9:47 AM          XR CHEST AP PORTABLE  (CPT=71045)    Result Date: 2/12/2024  CONCLUSION:  1. Moderate cardiomegaly.  Pulmonary venous distention.  Heart valve prosthesis. 2. Moderate perihilar lower lobe mixed alveolar and interstitial airspace opacification with bilateral effusions suggesting pulmonary congestive changes.  Slight progression right lung base.    Dictated by (CST): Justin Stark MD on 2/12/2024 at 7:51 AM     Finalized by (CST): Justin Stark MD on 2/12/2024 at 7:54 AM             Results:     CBC:    Lab Results   Component Value Date    WBC 4.3 02/13/2024    WBC 5.2 02/12/2024    WBC 6.6 02/11/2024     Lab Results   Component Value Date    HGB 7.2 (L) 02/13/2024    HGB 7.1 (L) 02/12/2024    HGB 7.4 (L) 02/11/2024      Lab Results   Component Value Date    PLT 44.0 (L) 02/13/2024    PLT 27.0 (L) 02/12/2024    PLT 14.0 (LL) 02/11/2024       Recent Labs   Lab 02/11/24  0514 02/12/24  0455 02/13/24  0559   * 134* 128*   BUN 33* 35* 29*   CREATSERUM 1.68* 1.64* 1.61*   CA 8.5* 8.7 8.8    140 137   K 5.0 4.2 3.5    105  103   CO2 24.0 26.0 24.0             Assessment and Plan:           Assessment & Plan:     Acute respiratory failure 2/2 cardiogenic shock   Repeat CXR with CHF.   On IV bumex dosing  Wean off ionotropes   Apprec pulm and cards consult   Repeat echo with EF 15%, severe TR  Blood cxs sent - ntd. Cont meropenem for now   Strict I/o and daily wts   Monitor creatinine closely which is rising. Renal consulted - apprec input   Severe tricuspid valve regurgitation  Bioprosthetic mitral valve  NICM status post ICD in September 2023--> US with nonocclusive clot on icd lead in subclavian vein. Cards aware   Recent echo showed ejection fraction 37% with grade 4 tricuspid regurg  Entresto, lasix and coreg on hold   Sp pacer interrogation - will need biv  Holding doac due to recent GI bleed, may benefit from watchman in near future , although pt has refused in the past  Now on IV bumex bolus dosing        2. Pancytopenia/ worsening severe acute TCP:  -had similar issue on last admission Dr Snyder was consulted; thought to be consumptive in seeing of acute illness vs drug induced at the time. No h/o heparin use   - apprec Hem input - platelets now 14 and will get 1 unit and prednisone started for possible immune process   - haptoglobin decreased and ldh elevated   - cont decadron for now. Repeat fibrinogen neg      3. Paroxysmal atrial fibrillation   Hold oral anticoagulation given recent GI bleed  Currently rate controlled  Still considering outpatient Watchman procedure given her recurrent GI bleeds     4. Chronic kidney injury stage III  - creat stable ; 200 out   - strict I/o's   - daily wts       5. Subclavian clot on icd lead - no AC at this time given platelets. Discussed with hem         DVT ppx scds      Discussed with pt's daughter Barbi with [facility]. She understands her heart fx is much worse. Discussed with Dr Lr.         Raiza Alvarez MD         Chart reviewed, including current vitals, notes,  labs and imaging  Labs ordered and medications adjusted as outlined above  Coordinate care with care team/consultants  Discussed with patient results of tests, management plan as outlined above, and the need for ongoing hospitalization  D/w RN     MDM high        Dispo: To rehab when platelets are stable

## 2024-02-13 NOTE — PLAN OF CARE
Margaret remains weak with poor appetite. [facility] discussion today with patient and palliative care nurse. Patients goals discussed. Patient wishes to discuss further with family on her decisions. Care is patient centered and will continue to provide care at this time. Will continue to assess patients choice and will makes adjustments as needed.     Problem: Patient Centered Care  Goal: Patient preferences are identified and integrated in the patient's plan of care  Description: Interventions:  - What would you like us to know as we care for you? I live with my  and two grandchildren  - Provide timely, complete, and accurate information to patient/family  - Incorporate patient and family knowledge, values, beliefs, and cultural backgrounds into the planning and delivery of care  - Encourage patient/family to participate in care and decision-making at the level they choose  - Honor patient and family perspectives and choices  Outcome: Progressing

## 2024-02-13 NOTE — TELEPHONE ENCOUNTER
Daughter's FMLA ([employer]) received at Forms Department on 2/9/2024. No JENNIFER on file - sent PT (for daughter) a [employer] message. Logged for processing.

## 2024-02-13 NOTE — PALLIATIVE CARE NOTE
[facility]  Palliative Care Progress Note    Margaret Silverio Patient Status:  Inpatient    3/14/1945 MRN [id number]   Location [facility] 3W/SW Attending Rosalinda Barry,    Hosp Day # 10 PCP Johnathon Rodriguez,      The  Cures Act makes medical notes like these available to patients in the interest of transparency. Please be advised this is a medical document. Medical documents are intended to carry relevant information, facts as evident, and the clinical opinion of the practitioner. The medical note is intended as peer to peer communication and may appear blunt or direct. It is written in medical language and may contain abbreviations or verbiage that are unfamiliar.       Subjective     Reviewed symptom medications past 24 hrs: Ultram 50 mg po X1    Patient was seen and examined in bed with no family present. Pt is A&O X4 and very weak appearing. She tells me she didn't sleep well overnight. She denies dyspnea symptoms on nc 3L. She is reporting mild hand pain from her RA, but says she doesn't need any pain meds. Appetite remains very poor and I encouraged her to try to eat some. Denies abdominal pain, n/v and moved her bowels today which were loose. Told her I would decrease bowel regimen now that she hasn't been taking many pain meds at this point.   See summary of discussion below.     Review of Systems:  See above for details    Allergies:  Allergies   Allergen Reactions    Lisinopril Coughing       Medications:     Current Facility-Administered Medications:     sodium chloride 0.9% infusion, , Intravenous, Once    bumetanide (Bumex) 12.5 mg in 50 mL infusion, 0.25 mg/hr, Intravenous, Continuous    sodium chloride 0.9% infusion, , Intravenous, Once    DOBUTamine in dextrose 5% (Dobutrex) 500 mg/250mL infusion premix, 5 mcg/kg/min (Dosing Weight), Intravenous, Continuous    meropenem (Merrem) 500 mg in sodium chloride 0.9% 100 mL IVPB-MBP, 500 mg, Intravenous,  q12h    milrinone in dextrose 5% (Primacor) 20 mg/100mL infusion premix, 0.25 mcg/kg/min, Intravenous, Continuous    amiodarone (Pacerone) tab 200 mg, 200 mg, Oral, Daily    [Held by provider] spironolactone (Aldactone) tab 25 mg, 25 mg, Oral, Daily    senna-docusate (Senokot-S) 8.6-50 MG per tab 2 tablet, 2 tablet, Oral, BID    traMADol (Ultram) tab 50 mg, 50 mg, Oral, Q6H PRN    polyethylene glycol (PEG 3350) (Miralax) 17 g oral packet 17 g, 17 g, Oral, Daily PRN    magnesium hydroxide (Milk of Magnesia) 400 MG/5ML oral suspension 30 mL, 30 mL, Oral, Daily PRN    bisacodyl (Dulcolax) 10 MG rectal suppository 10 mg, 10 mg, Rectal, Daily PRN    fleet enema (Fleet) 7-19 GM/118ML rectal enema 133 mL, 1 enema, Rectal, Once PRN    amitriptyline (Elavil) tab 25 mg, 25 mg, Oral, Nightly    sacubitril-valsartan (Entresto) 24-26 MG per tab 1 tablet, 1 tablet, Oral, BID    ferrous sulfate DR tab 325 mg, 325 mg, Oral, Daily with breakfast    folic acid (Folvite) tab 800 mcg, 800 mcg, Oral, Daily    HYDROcodone-acetaminophen (Norco)  MG per tab 1 tablet, 1 tablet, Oral, Q6H PRN    pantoprazole (Protonix) DR tab 40 mg, 40 mg, Oral, BID AC    alendronate (Fosamax) tab 70 mg, 70 mg, Oral, Weekly    Objective     Vital Signs:  Blood pressure 145/59, pulse 65, temperature 99.2 °F (37.3 °C), temperature source Temporal, resp. rate 15, weight 158 lb 11.7 oz (72 kg), SpO2 99%.  Body mass index is 26.41 kg/m².  Present Level of pain: mild hand pains  Non-verbal signs of pain present: No    Physical Exam:  General: Alert and in no apparent distress. Weak appearing  HEENT: No focal deficits.  Cardiac: Regular rate and rhythm, S1, S2 normal, no murmur, rub or gallop.  Lungs: Clear without wheezes, rales, rhonchi or dullness.  Normal excursions and effort.  Abdomen: Soft, non-tender, normal bowel sounds X 4 quadrants, no rebound or guarding  Extremities: Without clubbing, cyanosis. Peripheral pulses are 2+. BLE Edema not present,  severe hand deformities r/t RA  Neurologic: Alert and oriented X4  Skin: Warm and dry.    Prior to admission Palliative performance scale PPSv2 (%): 60    Current PPS 30%    Hematology:  Lab Results   Component Value Date    WBC 4.3 02/13/2024    HGB 7.2 (L) 02/13/2024    HCT 21.9 (L) 02/13/2024    PLT 44.0 (L) 02/13/2024       Coags:  Lab Results   Component Value Date    INR 1.25 (H) 02/09/2024    PTT 35.2 02/09/2024       Chemistry:  Lab Results   Component Value Date    CREATSERUM 1.61 (H) 02/13/2024    BUN 29 (H) 02/13/2024     02/13/2024    K 3.5 02/13/2024     02/13/2024    CO2 24.0 02/13/2024     (H) 02/13/2024    CA 8.8 02/13/2024    ALB 3.0 (L) 02/12/2024    ALKPHO 147 (H) 02/12/2024    BILT 1.9 (H) 02/12/2024    TP 5.8 02/12/2024     (H) 02/12/2024    ALT 29 02/12/2024    MG 2.0 02/07/2024    PHOS 2.7 02/09/2024       Imaging:  XR CHEST AP PORTABLE  (CPT=71045)    Result Date: 2/13/2024  CONCLUSION:  1. Essentially unchanged chest with redemonstration of moderate cardiomegaly and mild pulmonary congestive changes about the same.  Persistent bibasilar opacification suggesting probable pleural effusions, but I cannot exclude underlying bibasilar pneumonia and or atelectasis.  Findings are about the same.    Dictated by (CST): Josue Hogan MD on 2/13/2024 at 9:44 AM     Finalized by (CST): Josue Hogan MD on 2/13/2024 at 9:47 AM          XR CHEST AP PORTABLE  (CPT=71045)    Result Date: 2/12/2024  CONCLUSION:  1. Moderate cardiomegaly.  Pulmonary venous distention.  Heart valve prosthesis. 2. Moderate perihilar lower lobe mixed alveolar and interstitial airspace opacification with bilateral effusions suggesting pulmonary congestive changes.  Slight progression right lung base.    Dictated by (CST): Justin Stark MD on 2/12/2024 at 7:51 AM     Finalized by (CST): Justin Stark MD on 2/12/2024 at 7:54 AM          XR CHEST AP PORTABLE  (CPT=71045)    Result Date:  2/11/2024  CONCLUSION: No change    Dictated by (CST): Rey Jackson MD on 2/11/2024 at 9:11 AM     Finalized by (CST): Rey Jackson MD on 2/11/2024 at 9:11 AM          US VENOUS DOPPLER LEG BILAT - DIAG IMG (CPT=93970)    Result Date: 2/10/2024  CONCLUSION: No DVT    Dictated by (CST): Rey Jackson MD on 2/10/2024 at 3:25 PM     Finalized by (CST): Rey Jackson MD on 2/10/2024 at 3:26 PM          XR CHEST AP PORTABLE  (CPT=71045)    Result Date: 2/10/2024  CONCLUSION: No change    Dictated by (CST): Rey Jackson MD on 2/10/2024 at 1:05 PM     Finalized by (CST): Rey Jackson MD on 2/10/2024 at 1:06 PM           2/10/24 Echocardiogram  Conclusions:     1. Left ventricle: The cavity size was normal. Wall thickness was normal.      Systolic function was normal. The estimated ejection fraction was 15-20%,      by biplane method of disks. Doppler parameters are consistent with an      irreversible restrictive pattern, indicative of decreased left      ventricular diastolic compliance and/or increased left atrial pressure -      grade 4 diastolic dysfunction.   2. Right ventricle: The cavity size was markedly increased. Pacer wire noted      in the right ventricle. Although right ventricular systolic pressure was      not increased relative to right atrial pressure, the right atrial      pressure would appear to be markedly elevated as a result of wide-open      tricuspid regurgitation. Absolute RV systolic pressure was, therefore, at      least moderately increased.   3. Ventricular septum: Septal motion was dyssynergic.   4. Left atrium: The atrium was increased in size. Echodensity seen in LA.   5. Right atrium: The atrium was markedly dilated. Pacer wire noted in right      atrium.   6. Mitral valve: A bioprosthesis is present and functioning normally.   7. Tricuspid valve: There was malcoaptation of the valve leaflets. There was      wide-open regurgitation   Follow up GO Discussion    2/13/24-I spoke with pt  about her clinical condition. She tells me she is concerned about her poor health and says she doesn't want to suffer. She tells me yesterday she had many family members come to visit with her and says she is trying to get her affairs in order with help with her dtr Barbi. I readdressed code status again with pt in the setting of end stage CHF. Pt is contemplating making herself DNAR, but wants to talk with her first before making this decision. She will remain full code for now and wants to continue with supportive care.    I also called her dtr Barbi with clinical update. She talked with me about their discussions yesterday and she is coming back today to talk with her mother further. Barbi tells me the MDs told her mother about her poor prognosis yesterday. Barbi doesn't want her mother to suffer if things were to worsen and she will come to talk further with her about code status today. She will call me after their discussion and told her I can come back to meet with them. Provided emotional support to pt/dtr who are coping adequately.     Addendum 2p-I followed back with pt when her dtr Barbi came to visit. Pt had other family also come to visit with her. They did discuss further her code status and pt has expressed wishes for DNAR/DNI-selective and continue supportive care. I met with pt's dtr Barbi in conference room and we talked about her clinical picture. She is not sure pt would want to have long term palliative inotropic therapy and I encouraged further discussion with cardiology about this. POLST form was completed. They also know about option for comfort care with hospice if her condition worsens. She is not ready for this now and wants to continue with supportive care. C discussions will be ongoing.     2/12/24-Provided clinical update to pt. Talked with her about worsening heart failure/cardiogenic shock/GEORGE. We addressed her code status and pt wants to remain full code and continue  with supportive care.    Dr. Barry and I also called her dtr Barbi who is POA. We provided clinical overview and deterioration over the weekend. We stressed pt's overall poor prognosis and discussed end stage CHF with her. Discussed pt would not be dialysis candidate if renal function worsens. Barbi understands how ill pt is currently and she will be coming in to see her later today to have more discussions with her mother. Encouraged setting limits towards DNAR. Will continue supportive care and pt/family will need ongoing discussions through clinical course.     2/9/24-I met with pt and her family including dtr Barbi and  Low. I provided clinical overview. I also reinforced benefits of palliative care. I discussed the normal disease trajectory of CHF, concerns with ongoing pancytopenia/thrombocytopenia, DVT off A/C d/t thrombocytopenia, recent GIB, AFib with recommendation for w/u for watchman as outpt. She understands she will be at risk for recurrent hospitalizations and would be ok with being re-hospitalized again. Pt wants to continue with supportive care and we discussed plan for CÉSAR on dc. I recommended having a community palliative care program follow on dc which they seem open to. Discussed ongoing GOC discussions will be needed over time.     I also readdressed the importance of ACP prior to crisis. Pt was agreeable to talking about advance directives today. I reviewed HCPOA paperwork with pt in detail. She expressed wanting her dtr Barbi to be to be her primary HCPOA and  as secondary POA. HCPOA paperwork completed with pt. I also addressed her full code status and discussed the risks vs benefits of life sustaining treatments in the setting of her clinical picture and encouraged setting limits. Pt expressed wanting to remain full code. She tells us she would not want prolonged heroic measures taken or if she was in vegetative state, but would want resuscitation attempted. I  provided blank POLST form for their reference for the future.     Provided emotional support to pt/family who are coping adequately.       Discussed with Patient and Family: Yes  Patient's preference about sharing medical information: speak to pt and family  Patient's decision making preferences: speak to Pt  Code status: DNAR/DNI-selective  Have advanced directives been discussed with patient or healthcare power of : Yes        Healthcare Agent Appointed: Yes  Healthcare Agent's Name: Barbi Silverio  Healthcare Agent's Phone Number: [phone]          Spiritual needs addressed: Patient/family declined Spiritual Care    Palliative disposition: Community Palliative Care        Palliative Care Assessment and Recommendations     Problem List:     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  -Continue Miralax prn and ducolax supp prn  -Education provided on adequately bowel regimen while taking opioids.      Goals of care counseling  -see above for details  -Change code status to DNAR/DNI-selective and continue supportive care.  -Pt/dtr Barbi will need further discussion with cardiology about ongoing palliative inotropic therapy as outpatient.  -Pt/dtr Barbi are aware of poor prognosis and she is not candidate for dialysis if renal function worsens.  -Ongoing GOC discussions will be needed through clinical course and over time.  -Agreeable to palliative care following  -Dispo:  TBD pending clinical course. If improves, CÉSAR with [facility] program to  follow on dc. SW to help with dc planning.   -Provided emotional support to pt/family who are coping adequately.     Advance care planning  -see above for details  -Pt's dtr Barbi Silverio is primary HCPOA #[phone].  -Previously completed HCPOA paperwork in EPIC.  -Completed POLST form today, copy sent to registration for scan into [facility]     Palliative Performance Scale 30%     Emotion support provided to patient/family today: Yes       A total of 35 mins were spent on this consult, which included all of the following: direct face to face contact, history taking, physical examination, and >50% was spent counseling and coordinating care.    Discussed today's visit with message to Dr. Alvarez, Dr. Myers, Dr. Pacheco, and Javon GIL.    I will continue to follow clinically.     Aparna Mahan, VICKI-BC, [facility] [id number]  2/13/2024  2:18 PM  Palliative Care Services

## 2024-02-13 NOTE — PROGRESS NOTES
[facility]    Progress Note      Assessment and Plan:   1.  Acute on chronic respiratory failure-much better clinically on milrinone and dobutamine for cardiogenic shock.No longer requiring PAP therapy.  The lactic acid is markedly improved.  Fluid in the right major fissure.    Recommendations:  1.  Dual inotropes and as per cardiology  2.  PAP therapy as needed  3.  Morning chest x-ray, CBC and electrolytes  4.  Will follow clinically.  5.  Meropenem and await culture     2.  Thrombocytopenia-as per hematology    3.  Cardiomyopathy-as per cardiology    4.  DVT prophylaxis-SCDs temporarily in patient with thrombocytopenia.    5.  Anemia-hemoglobin is 7.2.  As the patient yet has dyspnea and borderline hemodynamics, will give a unit of blood.    Subjective:   Margaret Silverio is a(n) 78 year old female who is breathing more comfortably and is off PAP therapy    Objective:   Blood pressure 152/64, pulse 67, temperature 98.3 °F (36.8 °C), temperature source Temporal, resp. rate 14, weight 158 lb 11.7 oz (72 kg), SpO2 100%.    Physical Exam easily arousable black female  HEENT examination is unremarkable with pupils equal round and reactive to light and accommodation.   Neck without adenopathy, thyromegaly, JVD nor bruit.   Lungs diminished with basilar crackles to auscultation and percussion.  Cardiac regular rate and rhythm 1 out of 6 systolic ejection murmur.   Abdomen nontender, without hepatosplenomegaly and no mass appreciable.   Extremities without clubbing cyanosis nor edema.   Neurologic grossly intact with symmetric tone and strength and reflex.  Skin without gross abnormality     Results:     Lab Results   Component Value Date    WBC 4.3 02/13/2024    HGB 7.2 02/13/2024    HCT 21.9 02/13/2024    PLT 44.0 02/13/2024    CREATSERUM 1.61 02/13/2024    BUN 29 02/13/2024     02/13/2024    K 3.5 02/13/2024     02/13/2024    CO2 24.0 02/13/2024     02/13/2024    CA 8.8  02/13/2024       Aroldo Pacheco MD  Medical Director, Critical Care, [facility]  Medical Director, [facility]  Pager: [phone]

## 2024-02-13 NOTE — PROGRESS NOTES
[facility]    Progress Note    Margaret Silverio Patient Status:  Inpatient    3/14/1945 MRN [id number]   Location [facility] 3W/SW Attending Rosalinda Barry DO   Hosp Day # 10 PCP Johnathon Rodriguez DO     Chief complaint chf     Subjective:   Margaret Silverio is a(n) 78 year old female who is resting in bed. She is requesting some apple sauce and water.       Objective:   Blood pressure 152/64, pulse 67, temperature 98.3 °F (36.8 °C), temperature source Temporal, resp. rate 14, weight 158 lb 11.7 oz (72 kg), SpO2 100%.      Intake/Output Summary (Last 24 hours) at 2024 1459  Last data filed at 2024 1200  Gross per 24 hour   Intake 777.4 ml   Output 700 ml   Net 77.4 ml       Patient Weight(s) for the past 336 hrs:   Weight   24 0600 158 lb 11.7 oz (72 kg)   24 0800 149 lb 14.6 oz (68 kg)   24 0000 148 lb 13 oz (67.5 kg)   24 0800 147 lb 11.3 oz (67 kg)   24 0503 147 lb 14.4 oz (67.1 kg)   24 0434 149 lb (67.6 kg)   24 0457 149 lb 8 oz (67.8 kg)   24 1108 150 lb 14.4 oz (68.4 kg)   24 0400 149 lb 6.4 oz (67.8 kg)   24 0635 148 lb 14.4 oz (67.5 kg)   24 0453 149 lb 9.6 oz (67.9 kg)   24 0032 149 lb 14.4 oz (68 kg)   24 135 lb (61.2 kg)           General appearance: Alert and oriented x person and place   Pulmonary:  CTA , decreased at bases   Cardiovascular: S1, S2 normal, no murmur, click, rub or gallop, regular rate and rhythm  Abdominal: soft, non-tender; bowel sounds normal; no masses,  no organomegaly  Extremities: extremities normal, atraumatic, no cyanosis or edema        Medicines:           Lab Results   Component Value Date    WBC 4.3 2024    HGB 7.2 (L) 2024    HCT 21.9 (L) 2024    PLT 44.0 (L) 2024    CREATSERUM 1.61 (H) 2024    BUN 29 (H) 2024     2024    K 3.5 2024     2024    CO2 24.0 2024     (H)  02/13/2024    CA 8.8 02/13/2024    ALB 3.0 (L) 02/12/2024    ALKPHO 147 (H) 02/12/2024    BILT 1.9 (H) 02/12/2024    TP 5.8 02/12/2024     (H) 02/12/2024    ALT 29 02/12/2024    PTT 35.2 02/09/2024    INR 1.25 (H) 02/09/2024    T4F 1.2 02/03/2024    TSH 10.681 (H) 02/03/2024    LIP 32 01/13/2024    MG 2.0 02/07/2024    PHOS 2.7 02/09/2024    B12 >2,000 (H) 02/03/2024       XR CHEST AP PORTABLE  (CPT=71045)    Result Date: 2/13/2024  CONCLUSION:  1. Essentially unchanged chest with redemonstration of moderate cardiomegaly and mild pulmonary congestive changes about the same.  Persistent bibasilar opacification suggesting probable pleural effusions, but I cannot exclude underlying bibasilar pneumonia and or atelectasis.  Findings are about the same.    Dictated by (CST): Josue Hogan MD on 2/13/2024 at 9:44 AM     Finalized by (CST): Josue Hogan MD on 2/13/2024 at 9:47 AM          XR CHEST AP PORTABLE  (CPT=71045)    Result Date: 2/12/2024  CONCLUSION:  1. Moderate cardiomegaly.  Pulmonary venous distention.  Heart valve prosthesis. 2. Moderate perihilar lower lobe mixed alveolar and interstitial airspace opacification with bilateral effusions suggesting pulmonary congestive changes.  Slight progression right lung base.    Dictated by (CST): Justin Stark MD on 2/12/2024 at 7:51 AM     Finalized by (CST): Justin Stark MD on 2/12/2024 at 7:54 AM             Results:     CBC:    Lab Results   Component Value Date    WBC 4.3 02/13/2024    WBC 5.2 02/12/2024    WBC 6.6 02/11/2024     Lab Results   Component Value Date    HGB 7.2 (L) 02/13/2024    HGB 7.1 (L) 02/12/2024    HGB 7.4 (L) 02/11/2024      Lab Results   Component Value Date    PLT 44.0 (L) 02/13/2024    PLT 27.0 (L) 02/12/2024    PLT 14.0 (LL) 02/11/2024       Recent Labs   Lab 02/11/24  0514 02/12/24  0455 02/13/24  0559   * 134* 128*   BUN 33* 35* 29*   CREATSERUM 1.68* 1.64* 1.61*   CA 8.5* 8.7 8.8    140 137   K 5.0 4.2  3.5    105 103   CO2 24.0 26.0 24.0             Assessment and Plan:           Assessment & Plan:     Acute respiratory failure 2/2 cardiogenic shock   Repeat CXR with CHF. Restart lasix today   Abg with metabolic acidosis.Lactic 10-> 4. Repeat today   Apprec pulm and cards consult   Repeat echo with EF 15%, severe TR  Blood cxs sent - ntd. Cont meropenem for now   Cont dobutamine and milrinone per Cards   Strict I/o and daily wts   Monitor creatinine closely which is rising. Renal consulted - apprec input   Severe tricuspid valve regurgitation  Bioprosthetic mitral valve  NICM status post ICD in September 2023--> US with nonocclusive clot on icd lead in subclavian vein. Cards aware   Recent echo showed ejection fraction 37% with grade 4 tricuspid regurg  Low dose hydralazine started. Resume Entresto. Start Isordil. On bumex drip   Sp pacer interrogation - will need biv  Holding doac due to recent GI bleed, may benefit from watchman in near future , although pt has refused in the past  No longer needs PAP therapy        2. Pancytopenia/ worsening severe acute TCP:  -had similar issue on last admission Dr Snyder was consulted; thought to be consumptive in seeing of acute illness vs drug induced at the time. No h/o heparin use   - apprec Hem input - continue on prednisone   - haptoglobin decreased and ldh elevated   - Had one unit of blood 2/12  - will receive another unit today due to dyspnea per Pulm   - cont decadron for now. Repeat fibrinogen neg      3. Paroxysmal atrial fibrillation   Hold oral anticoagulation given recent GI bleed  Currently rate controlled  Still considering outpatient Watchman procedure given her recurrent GI bleeds     4. Chronic kidney injury stage III  - creat stable ; 200 out   - strict I/o's   - daily wts       5. Subclavian clot on icd lead - no AC at this time given platelets. Discussed with hem         DVT ppx scds      Discussed with pt's daughter Barbi with [facility]. She understands her heart fx is much worse. Discussed with Dr Lr.   Patient is not DNR/Select.       Raiza Alvarez MD         Chart reviewed, including current vitals, notes, labs and imaging  Labs ordered and medications adjusted as outlined above  Coordinate care with care team/consultants  Discussed with patient results of tests, management plan as outlined above, and the need for ongoing hospitalization  D/w RN     MDM high

## 2024-02-13 NOTE — PROGRESS NOTES
Cardiology Progress Note    Margaret Silverio Patient Status:  Inpatient    3/14/1945 MRN [id number]   Location [facility] 2W/SW Attending Raiza Alvarez MD   Hosp Day # 10 PCP Johnathon Rodriguez,      Interval Note:  Pt seen and examined - doing well, no acute issues overnight  Denies chest pain, shortness of breath or palpitations      --------------------------------------------------------------------------------------------------------------------------------  ROS 12 systems reviewed, pertinent findings above.  ROS    History:  Past Medical History:   Diagnosis Date    Anemia     Arrhythmia     Arthritis     Deep vein thrombosis (HCC)     Essential hypertension     High blood pressure     History of blood transfusion     Seizure disorder (HCC)     Seizures (HCC)      Past Surgical History:   Procedure Laterality Date    COLONOSCOPY N/A 2024    Dr. Rios    COLONOSCOPY N/A 2024    Procedure: COLONOSCOPY;  Surgeon: Zoraida Rios MD;  Location: [facility] ENDOSCOPY    EGD N/A 2024    Dr. Rios    OTHER SURGICAL HISTORY  2017    Heart Surgery     OTHER SURGICAL HISTORY  2020    Bilateral shoulder replacement      No family history on file.   reports that she quit smoking about 10 years ago. Her smoking use included cigarettes. She smoked an average of 0.3 packs per day. She has never used smokeless tobacco. She reports that she does not drink alcohol and does not use drugs.    Objective:   Temp: 98.3 °F (36.8 °C)  Pulse: 67  Resp: 14  BP: 152/64    Intake/Output:     Intake/Output Summary (Last 24 hours) at 2024 1108  Last data filed at 2024 0910  Gross per 24 hour   Intake 728.8 ml   Output 1050 ml   Net -321.2 ml       Physical Exam:    General: AOx3  HEENT: Normocephalic, anicteric sclera, neck supple.  Neck: No JVD, carotids 2+, no bruits.  Cardiac: Regular rate and rhythm. S1, S2 normal. No murmur, pericardial rub, S3.  Lungs: Clear without wheezes, rales, rhonchi or dullness.   Normal excursions and effort.  Abdomen: Soft, non-tender. BS-present.  Extremities: Without clubbing, cyanosis or edema.  Peripheral pulses are 2+.  Neurologic: Non-focal  Skin: Warm and dry.     Echo 2/10/24  1. Left ventricle: The cavity size was normal. Wall thickness was normal.      Systolic function was normal. The estimated ejection fraction was 15-20%,      by biplane method of disks. Doppler parameters are consistent with an      irreversible restrictive pattern, indicative of decreased left      ventricular diastolic compliance and/or increased left atrial pressure -      grade 4 diastolic dysfunction.   2. Right ventricle: The cavity size was markedly increased. Pacer wire noted      in the right ventricle. Although right ventricular systolic pressure was      not increased relative to right atrial pressure, the right atrial      pressure would appear to be markedly elevated as a result of wide-open      tricuspid regurgitation. Absolute RV systolic pressure was, therefore, at      least moderately increased.   3. Ventricular septum: Septal motion was dyssynergic.   4. Left atrium: The atrium was increased in size. Echodensity seen in LA.   5. Right atrium: The atrium was markedly dilated. Pacer wire noted in right      atrium.   6. Mitral valve: A bioprosthesis is present and functioning normally.   7. Tricuspid valve: There was malcoaptation of the valve leaflets. There was      wide-open regurgitation.       Assessment   Acute on chronic decompensated systolic and diastolic heart failure, cardiogenic shock  Cardiomyopathy, EF 15%  Severe tricuspid valve regurgitation  History of bioprosthetic mitral valve replacement  Lactic acidosis secondary to cardiogenic etiology  Left atrial mass  Paroxysmal atrial fibrillation/flutter  History of ICD, dual-chamber pacing  Thrombocytopenia, immune related  Anemia, h/o GIB  Question of infection/sepsis    Plan  - Patient is end-stage heart failure, NYHA IV with  inotrope dependence  - Patient appears to be perfusing well, extremities warm, mentating well and has good urine output this morning.  - Currently, blood pressures running high - may not tolerate outpatient betablocker especially if inotrope dependent  - Continue Entresto start low-dose hydralazine/Isordil for afterload reduction  -Once compensated then consider weaning dobutamine and keeping milrinone-may need outpatient milrinone  -Continue Bumex drip    Thank you for allowing me to take part in the care of Margaret Silverio. Please call with any questions of concerns.      Level of care: L4    Adriana Myers DO  Interventional Cardiology  [facility]    Adriana Myers DO  2/13/2024  11:08 AM

## 2024-02-13 NOTE — PLAN OF CARE
Problem: CARDIOVASCULAR - ADULT  Goal: Maintains optimal cardiac output and hemodynamic stability  Description: INTERVENTIONS:  - Monitor vital signs, rhythm, and trends  - Monitor for bleeding, hypotension and signs of decreased cardiac output  - Evaluate effectiveness of vasoactive medications to optimize hemodynamic stability  - Monitor arterial and/or venous puncture sites for bleeding and/or hematoma  - Assess quality of pulses, skin color and temperature  - Assess for signs of decreased coronary artery perfusion - ex. Angina  - Evaluate fluid balance, assess for edema, trend weights  Outcome: Progressing  Goal: Absence of cardiac arrhythmias or at baseline  Description: INTERVENTIONS:  - Continuous cardiac monitoring, monitor vital signs, obtain 12 lead EKG if indicated  - Evaluate effectiveness of antiarrhythmic and heart rate control medications as ordered  - Initiate emergency measures for life threatening arrhythmias  - Monitor electrolytes and administer replacement therapy as ordered  Outcome: Progressing     Problem: RESPIRATORY - ADULT  Goal: Achieves optimal ventilation and oxygenation  Description: INTERVENTIONS:  - Assess for changes in respiratory status  - Assess for changes in mentation and behavior  - Position to facilitate oxygenation and minimize respiratory effort  - Oxygen supplementation based on oxygen saturation or ABGs  - Provide Smoking Cessation handout, if applicable  - Encourage broncho-pulmonary hygiene including cough, deep breathe, Incentive Spirometry  - Assess the need for suctioning and perform as needed  - Assess and instruct to report SOB or any respiratory difficulty  - Respiratory Therapy support as indicated  - Manage/alleviate anxiety  - Monitor for signs/symptoms of CO2 retention  Outcome: Progressing     Problem: GENITOURINARY - ADULT  Goal: Absence of urinary retention  Description: INTERVENTIONS:  - Assess patient’s ability to void and empty bladder  - Monitor  intake/output and perform bladder scan as needed  - Follow urinary retention protocol/standard of care  - Consider collaborating with pharmacy to review patient's medication profile  - Implement strategies to promote bladder emptying  Outcome: Progressing     Problem: Impaired Functional Mobility  Goal: Achieve highest/safest level of mobility/gait  Description: Interventions:  - Assess patient's functional ability and stability  - Promote increasing activity/tolerance for mobility and gait  - Educate and engage patient/family in tolerated activity level and precautions  - Recommend use of sit-stand lift for transfers  Outcome: Progressing

## 2024-02-13 NOTE — CM/SW NOTE
Palliative is now following as patient is end-stage heart failure.  Code status is now DNAR.  Family wishes to continue with supportive care at this time.    Margaret is not yet medically stable for transfer to [facility].  If [facility] remains the choice for discharge, will need updated therapy notes and new insurance approval.    PLAN:  continue to follow Margaret for dc planning notes and assist with post-acute transition.    / to remain available for support and/or discharge planning.      Sandy Raza MBA BSN RN CRRN   RN Case Manager  [phone]

## 2024-02-13 NOTE — PROGRESS NOTES
Hematology Oncology Progress Note    Patient Name: Margaret Silverio   YOB: 1945   Medical Record Number: [id number]   CSN: [id number]   Attending Physician: Briana Snyder MD     Subjective:  No acute events. Feels little better today and breathing more comfortably.   Still on dual inotropes and bumex drip.   No bleeding issues.     Objective:  Vitals:  Vitals:    02/13/24 0700 02/13/24 0800 02/13/24 0900 02/13/24 1000   BP: 153/70 150/69 158/71 152/64   BP Location: Right arm Right arm Right arm Right arm   Pulse: 68 67 68 67   Resp: 15 16 16 14   Temp:  98.3 °F (36.8 °C)     TempSrc:  Temporal     SpO2: 99% 99% 99% 100%   Weight:           Current Medications:    Current Facility-Administered Medications:     sodium chloride 0.9% infusion, , Intravenous, Once    [START ON 2/14/2024] senna-docusate (Senokot-S) 8.6-50 MG per tab 2 tablet, 2 tablet, Oral, Daily    isosorbide dinitrate (Isordil Titradose) tab 5 mg, 5 mg, Oral, TID (Nitrates)    hydrALAZINE (Apresoline) tab 10 mg, 10 mg, Oral, Q8H [place]    bumetanide (Bumex) 12.5 mg in 50 mL infusion, 0.25 mg/hr, Intravenous, Continuous    sodium chloride 0.9% infusion, , Intravenous, Once    DOBUTamine in dextrose 5% (Dobutrex) 500 mg/250mL infusion premix, 5 mcg/kg/min (Dosing Weight), Intravenous, Continuous    meropenem (Merrem) 500 mg in sodium chloride 0.9% 100 mL IVPB-MBP, 500 mg, Intravenous, q12h    milrinone in dextrose 5% (Primacor) 20 mg/100mL infusion premix, 0.25 mcg/kg/min, Intravenous, Continuous    amiodarone (Pacerone) tab 200 mg, 200 mg, Oral, Daily    [Held by provider] spironolactone (Aldactone) tab 25 mg, 25 mg, Oral, Daily    traMADol (Ultram) tab 50 mg, 50 mg, Oral, Q6H PRN    polyethylene glycol (PEG 3350) (Miralax) 17 g oral packet 17 g, 17 g, Oral, Daily PRN    magnesium hydroxide (Milk of Magnesia) 400 MG/5ML oral suspension 30 mL, 30 mL, Oral, Daily PRN    bisacodyl (Dulcolax) 10 MG rectal suppository 10 mg, 10 mg, Rectal,  Daily PRN    fleet enema (Fleet) 7-19 GM/118ML rectal enema 133 mL, 1 enema, Rectal, Once PRN    amitriptyline (Elavil) tab 25 mg, 25 mg, Oral, Nightly    sacubitril-valsartan (Entresto) 24-26 MG per tab 1 tablet, 1 tablet, Oral, BID    ferrous sulfate DR tab 325 mg, 325 mg, Oral, Daily with breakfast    folic acid (Folvite) tab 800 mcg, 800 mcg, Oral, Daily    HYDROcodone-acetaminophen (Norco)  MG per tab 1 tablet, 1 tablet, Oral, Q6H PRN    pantoprazole (Protonix) DR tab 40 mg, 40 mg, Oral, BID AC    alendronate (Fosamax) tab 70 mg, 70 mg, Oral, Weekly    Physical Examination:  General: Lethargic, frail, oriented.   Chest: Clear to auscultation.  Heart: Irregular. + ICD in place.   Abdomen: Soft, non tender  Extremities: Pitting edema bilateral LE edema. +LUE edema  Neurological: Grossly intact.     Labs:  Recent Labs   Lab 02/11/24  0514 02/12/24  0457 02/13/24  0559   RBC 2.44* 2.32* 2.34*   HGB 7.4* 7.1* 7.2*   HCT 22.7* 21.8* 21.9*   MCV 93.0 94.0 93.6   MCH 30.3 30.6 30.8   MCHC 32.6 32.6 32.9   RDW 20.1* 21.0* 21.2*   NEPRELIM 5.93 4.80 3.71   WBC 6.6 5.2 4.3   PLT 14.0* 27.0* 44.0*     Recent Labs   Lab 02/09/24  0501 02/10/24  0745 02/10/24  1352 02/11/24  0514 02/12/24  0455 02/13/24  0559   GLU 84   < > 90 128* 134* 128*   BUN 17   < > 25* 33* 35* 29*   CREATSERUM 1.11*   < > 1.66* 1.68* 1.64* 1.61*   EGFRCR 51*   < > 32* 31* 32* 33*   CA 8.4*   < > 8.7 8.5* 8.7 8.8   ALB 3.0*  --  3.7 3.3 3.0*  --       < > 137 137 140 137   K 3.7  3.7   < > 5.4* 5.0 4.2 3.5      < > 102 102 105 103   CO2 25.0   < > 16.0* 24.0 26.0 24.0   ALKPHO 153*  --  181*  --  147*  --    AST 21  --  59*  --  118*  --    ALT 11  --  16  --  29  --    BILT 1.9*  --  2.3*  --  1.9*  --    TP 6.0  --  7.1  --  5.8  --     < > = values in this interval not displayed.      Recent Labs   Lab 02/09/24  1458   INR 1.25*   PTT 35.2        Radiology:  XR CHEST AP PORTABLE  (CPT=71045)    Result Date:  2/13/2024  CONCLUSION:  1. Essentially unchanged chest with redemonstration of moderate cardiomegaly and mild pulmonary congestive changes about the same.  Persistent bibasilar opacification suggesting probable pleural effusions, but I cannot exclude underlying bibasilar pneumonia and or atelectasis.  Findings are about the same.    Dictated by (CST): Josue Hogan MD on 2/13/2024 at 9:44 AM     Finalized by (CST): Josue Hogan MD on 2/13/2024 at 9:47 AM            Impression and Plan:  78 year old AAF with acute on chronic CHF, chronic anemia, hematology consulted for worsening pancytopenia:     Thrombocytopenia  - severe acute thrombocytopenia, with normal count at baseline probably consumptive in the setting of acute heart failure/shock, less likely immune mediated.   - work up with no evidence of hemolysis or DIC.  - completed dex 40 mg x4 days.   - platelet count improving up to 44 today. Last plt transfusion on 2/11  - continue supportive transfusion prn     Acute on chronic CHF  - cardiogenic shock secondary to end-stage nonischemic cardiomyopathy, history of mitral valve replacement with bioprosthetic valve paroxysmal atrial fibrillation/flutter.   - management per cardiology, on dual inotropes and bumex drip    Left subclavian DVT  - nonocclusive DVT along the margin of ICD lead in the left subclavian vein  - may resume anticoagulation if plt count improves > 50 and safe from GI standpoint.  - pt was on chronic AC (eliquis) for A fib, stopped due to GIB     Acute on chronic anemia   - chronic multifactorial anemia; anemia of chronic disease, renal failure, drug induced (methotrexate), recent GI blood loss in the setting of anticoagulation  - prior BMBx was negative per patient- done in [place] few years ago. no records available.   - currently no sings or symptoms of active bleeding.  - transfuse as needed to keep hgb => 8 due to cardiac disease.     Will continue to follow      Briana Snyder MD    [facility]

## 2024-02-14 ENCOUNTER — APPOINTMENT (OUTPATIENT)
Dept: GENERAL RADIOLOGY | Facility: HOSPITAL | Age: 79
End: 2024-02-14
Attending: INTERNAL MEDICINE
Payer: MEDICARE

## 2024-02-14 LAB
ANION GAP SERPL CALC-SCNC: 7 MMOL/L (ref 0–18)
BASOPHILS # BLD AUTO: 0.01 X10(3) UL (ref 0–0.2)
BASOPHILS NFR BLD AUTO: 0.2 %
BLOOD TYPE BARCODE: 5100
BUN BLD-MCNC: 31 MG/DL (ref 9–23)
BUN/CREAT SERPL: 20.4 (ref 10–20)
CALCIUM BLD-MCNC: 8.7 MG/DL (ref 8.7–10.4)
CHLORIDE SERPL-SCNC: 103 MMOL/L (ref 98–112)
CO2 SERPL-SCNC: 27 MMOL/L (ref 21–32)
CREAT BLD-MCNC: 1.52 MG/DL
DEPRECATED RDW RBC AUTO: 55.9 FL (ref 35.1–46.3)
EGFRCR SERPLBLD CKD-EPI 2021: 35 ML/MIN/1.73M2 (ref 60–?)
EOSINOPHIL # BLD AUTO: 0 X10(3) UL (ref 0–0.7)
EOSINOPHIL NFR BLD AUTO: 0 %
ERYTHROCYTE [DISTWIDTH] IN BLOOD BY AUTOMATED COUNT: 20.7 % (ref 11–15)
GLUCOSE BLD-MCNC: 126 MG/DL (ref 70–99)
HCT VFR BLD AUTO: 26.9 %
HGB BLD-MCNC: 8.9 G/DL
IMM GRANULOCYTES # BLD AUTO: 0.11 X10(3) UL (ref 0–1)
IMM GRANULOCYTES NFR BLD: 2.5 %
LYMPHOCYTES # BLD AUTO: 0.19 X10(3) UL (ref 1–4)
LYMPHOCYTES NFR BLD AUTO: 4.4 %
MAGNESIUM SERPL-MCNC: 1.9 MG/DL (ref 1.6–2.6)
MCH RBC QN AUTO: 29.9 PG (ref 26–34)
MCHC RBC AUTO-ENTMCNC: 33.1 G/DL (ref 31–37)
MCV RBC AUTO: 90.3 FL
MONOCYTES # BLD AUTO: 0.47 X10(3) UL (ref 0.1–1)
MONOCYTES NFR BLD AUTO: 10.8 %
NEUTROPHILS # BLD AUTO: 3.58 X10 (3) UL (ref 1.5–7.7)
NEUTROPHILS # BLD AUTO: 3.58 X10(3) UL (ref 1.5–7.7)
NEUTROPHILS NFR BLD AUTO: 82.1 %
OSMOLALITY SERPL CALC.SUM OF ELEC: 292 MOSM/KG (ref 275–295)
PLATELET # BLD AUTO: 89 10(3)UL (ref 150–450)
POTASSIUM SERPL-SCNC: 3.4 MMOL/L (ref 3.5–5.1)
POTASSIUM SERPL-SCNC: 3.7 MMOL/L (ref 3.5–5.1)
RBC # BLD AUTO: 2.98 X10(6)UL
SODIUM SERPL-SCNC: 137 MMOL/L (ref 136–145)
UNIT VOLUME: 350 ML
WBC # BLD AUTO: 4.4 X10(3) UL (ref 4–11)

## 2024-02-14 PROCEDURE — 99231 SBSQ HOSP IP/OBS SF/LOW 25: CPT | Performed by: REGISTERED NURSE

## 2024-02-14 PROCEDURE — 99233 SBSQ HOSP IP/OBS HIGH 50: CPT | Performed by: INTERNAL MEDICINE

## 2024-02-14 PROCEDURE — 71045 X-RAY EXAM CHEST 1 VIEW: CPT | Performed by: INTERNAL MEDICINE

## 2024-02-14 PROCEDURE — 99233 SBSQ HOSP IP/OBS HIGH 50: CPT | Performed by: HOSPITALIST

## 2024-02-14 RX ORDER — POTASSIUM CHLORIDE 20 MEQ/1
40 TABLET, EXTENDED RELEASE ORAL EVERY 4 HOURS
Status: COMPLETED | OUTPATIENT
Start: 2024-02-14 | End: 2024-02-14

## 2024-02-14 RX ORDER — BUMETANIDE 0.25 MG/ML
1 INJECTION INTRAMUSCULAR; INTRAVENOUS
Status: DISCONTINUED | OUTPATIENT
Start: 2024-02-14 | End: 2024-02-17

## 2024-02-14 RX ORDER — POTASSIUM CHLORIDE 1.5 G/1.58G
40 POWDER, FOR SOLUTION ORAL ONCE
Status: COMPLETED | OUTPATIENT
Start: 2024-02-14 | End: 2024-02-14

## 2024-02-14 RX ORDER — HYDRALAZINE HYDROCHLORIDE 25 MG/1
25 TABLET, FILM COATED ORAL EVERY 8 HOURS SCHEDULED
Status: DISCONTINUED | OUTPATIENT
Start: 2024-02-14 | End: 2024-02-18

## 2024-02-14 NOTE — PROGRESS NOTES
[facility]    Progress Note    Margaret Silverio Patient Status:  Inpatient    3/14/1945 MRN [id number]   Location [facility] 3W/SW Attending Rosalinda Barry DO   Hosp Day # 11 PCP Johnathon Rodriguez DO     Chief complaint chf     Subjective:   Margaret Silverio is a(n) 78 year old female who feels better today. No sob or chest pain       Objective:   Blood pressure 135/76, pulse 68, temperature 99.6 °F (37.6 °C), temperature source Temporal, resp. rate 14, weight 156 lb 1.4 oz (70.8 kg), SpO2 97%.      Intake/Output Summary (Last 24 hours) at 2024 1619  Last data filed at 2024 1354  Gross per 24 hour   Intake 1799.62 ml   Output 2875 ml   Net -1075.38 ml       Patient Weight(s) for the past 336 hrs:   Weight   24 0559 156 lb 1.4 oz (70.8 kg)   24 0600 158 lb 11.7 oz (72 kg)   24 0800 149 lb 14.6 oz (68 kg)   24 0000 148 lb 13 oz (67.5 kg)   24 0800 147 lb 11.3 oz (67 kg)   24 0503 147 lb 14.4 oz (67.1 kg)   24 0434 149 lb (67.6 kg)   24 0457 149 lb 8 oz (67.8 kg)   24 1108 150 lb 14.4 oz (68.4 kg)   24 0400 149 lb 6.4 oz (67.8 kg)   24 0635 148 lb 14.4 oz (67.5 kg)   24 0453 149 lb 9.6 oz (67.9 kg)   24 0032 149 lb 14.4 oz (68 kg)   24 135 lb (61.2 kg)           General appearance: Alert and oriented x person and place   Pulmonary:  CTA , decreased at bases   Cardiovascular: S1, S2 normal, no murmur, click, rub or gallop, regular rate and rhythm  Abdominal: soft, non-tender; bowel sounds normal; no masses,  no organomegaly  Extremities: extremities normal, atraumatic, no cyanosis or edema        Medicines:           Lab Results   Component Value Date    WBC 4.4 2024    HGB 8.9 (L) 2024    HCT 26.9 (L) 2024    PLT 89.0 (L) 2024    CREATSERUM 1.52 (H) 2024    BUN 31 (H) 2024     2024    K 3.4 (L) 2024     2024    CO2 27.0  02/14/2024     (H) 02/14/2024    CA 8.7 02/14/2024    ALB 3.0 (L) 02/12/2024    ALKPHO 147 (H) 02/12/2024    BILT 1.9 (H) 02/12/2024    TP 5.8 02/12/2024     (H) 02/12/2024    ALT 29 02/12/2024    PTT 35.2 02/09/2024    INR 1.25 (H) 02/09/2024    T4F 1.2 02/03/2024    TSH 10.681 (H) 02/03/2024    LIP 32 01/13/2024    MG 1.9 02/14/2024    PHOS 2.7 02/09/2024    B12 >2,000 (H) 02/03/2024       XR CHEST AP PORTABLE  (CPT=71045)    Result Date: 2/14/2024  CONCLUSION:  1. Cardiomegaly.  Prominent central vasculature. 2. Tortuous thoracic aorta.  Cardiac valve prosthesis. 3. Bilateral perihilar and lower lobe parenchymal opacification most pronounced at the left base with suspected bilateral effusions.  Partial clearing right basilar pleural parenchymal abnormality    Dictated by (CST): Justin Stark MD on 2/14/2024 at 8:08 AM     Finalized by (CST): Justin Stark MD on 2/14/2024 at 8:11 AM          XR CHEST AP PORTABLE  (CPT=71045)    Result Date: 2/13/2024  CONCLUSION:  1. Essentially unchanged chest with redemonstration of moderate cardiomegaly and mild pulmonary congestive changes about the same.  Persistent bibasilar opacification suggesting probable pleural effusions, but I cannot exclude underlying bibasilar pneumonia and or atelectasis.  Findings are about the same.    Dictated by (CST): Josue Hogan MD on 2/13/2024 at 9:44 AM     Finalized by (CST): Josue Hogan MD on 2/13/2024 at 9:47 AM             Results:     CBC:    Lab Results   Component Value Date    WBC 4.4 02/14/2024    WBC 4.3 02/13/2024    WBC 5.2 02/12/2024     Lab Results   Component Value Date    HGB 8.9 (L) 02/14/2024    HGB 7.2 (L) 02/13/2024    HGB 7.1 (L) 02/12/2024      Lab Results   Component Value Date    PLT 89.0 (L) 02/14/2024    PLT 44.0 (L) 02/13/2024    PLT 27.0 (L) 02/12/2024       Recent Labs   Lab 02/12/24  0455 02/13/24  0559 02/14/24  0524   * 128* 126*   BUN 35* 29* 31*   CREATSERUM 1.64* 1.61*  1.52*   CA 8.7 8.8 8.7    137 137   K 4.2 3.5 3.4*    103 103   CO2 26.0 24.0 27.0             Assessment and Plan:           Assessment & Plan:     Acute respiratory failure 2/2 cardiogenic shock   Repeat CXR with CHF. Restart lasix today   Abg with metabolic acidosis.Lactic 10-> 4. Repeat today   Apprec pulm and cards consult   Repeat echo with EF 15%, severe TR  Blood cxs sent - ntd. Cont meropenem for now   Cont dobutamine and milrinone per Cards   Strict I/o and daily wts   Monitor creatinine closely which is rising. Renal consulted - apprec input   Severe tricuspid valve regurgitation  Bioprosthetic mitral valve  NICM status post ICD in September 2023--> US with nonocclusive clot on icd lead in subclavian vein. Cards aware   Recent echo showed ejection fraction 37% with grade 4 tricuspid regurg  Low dose hydralazine started. Resume Entresto. Start Isordil. On bumex drip   Sp pacer interrogation - will need biv  Holding doac due to recent GI bleed, may benefit from watchman in near future , although pt has refused in the past  No longer needs PAP therapy        2. Pancytopenia/ worsening severe acute TCP:  -had similar issue on last admission Dr Snyder was consulted; thought to be consumptive in seeing of acute illness vs drug induced at the time. No h/o heparin use   - apprec Hem input - continue on prednisone   - haptoglobin decreased and ldh elevated   - Had one unit of blood 2/12  - will receive another unit today due to dyspnea per Pulm   - cont decadron for now. Repeat fibrinogen neg      3. Paroxysmal atrial fibrillation   Hold oral anticoagulation given recent GI bleed  Currently rate controlled  Still considering outpatient Watchman procedure given her recurrent GI bleeds     4. Chronic kidney injury stage III  - creat stable ; 200 out   - strict I/o's   - daily wts       5. Subclavian clot on icd lead - no AC at this time given platelets. Discussed with hem         DVT ppx scds       Discussed with pt's daughter Barbi with [facility]. She understands her heart fx is much worse. Discussed with Dr Lr.   Patient is not DNR/Select.       Raiza Alvarez MD         Chart reviewed, including current vitals, notes, labs and imaging  Labs ordered and medications adjusted as outlined above  Coordinate care with care team/consultants  Discussed with patient results of tests, management plan as outlined above, and the need for ongoing hospitalization  D/w RN     MDM high

## 2024-02-14 NOTE — PALLIATIVE CARE NOTE
[facility]  Palliative Care Progress Note    Margaret Silverio Patient Status:  Inpatient    3/14/1945 MRN [id number]   Location [facility] 3W/SW Attending Rosalinda Barry,    Hosp Day # 11 PCP Johnathon Rodriguez,      The  Cures Act makes medical notes like these available to patients in the interest of transparency. Please be advised this is a medical document. Medical documents are intended to carry relevant information, facts as evident, and the clinical opinion of the practitioner. The medical note is intended as peer to peer communication and may appear blunt or direct. It is written in medical language and may contain abbreviations or verbiage that are unfamiliar.       Subjective     Reviewed symptom medications past 24 hrs: Ultram 50 mg po X1, Norco 10/325 mg po X1    Patient was seen and examined in bed with her brother and grandtr present. Pt is A&O X4 and tells me she feels better today. She says her spirits have lifted some with having her family visit with her. She denies any dyspnea symptoms and remains on nc 1L, sats 99%, Denies any current pain issues. Appetite remains poor and encouraged po intake. Denies abdominal pain, n/v and moved her bowels yesterday. She remains on bumex gtt, milrinone and dobutamine gtts. VSS    See summary of discussion below.     Review of Systems:  See above for details    Allergies:  Allergies   Allergen Reactions    Lisinopril Coughing       Medications:     Current Facility-Administered Medications:     potassium chloride (K-Dur) tab 40 mEq, 40 mEq, Oral, Q4H    sodium chloride 0.9% infusion, , Intravenous, Once    senna-docusate (Senokot-S) 8.6-50 MG per tab 2 tablet, 2 tablet, Oral, Daily    isosorbide dinitrate (Isordil Titradose) tab 5 mg, 5 mg, Oral, TID (Nitrates)    hydrALAZINE (Apresoline) tab 10 mg, 10 mg, Oral, Q8H BROOKLYNN    bumetanide (Bumex) 12.5 mg in 50 mL infusion, 0.25 mg/hr, Intravenous, Continuous    sodium  chloride 0.9% infusion, , Intravenous, Once    DOBUTamine in dextrose 5% (Dobutrex) 500 mg/250mL infusion premix, 5 mcg/kg/min (Dosing Weight), Intravenous, Continuous    meropenem (Merrem) 500 mg in sodium chloride 0.9% 100 mL IVPB-MBP, 500 mg, Intravenous, q12h    milrinone in dextrose 5% (Primacor) 20 mg/100mL infusion premix, 0.25 mcg/kg/min, Intravenous, Continuous    amiodarone (Pacerone) tab 200 mg, 200 mg, Oral, Daily    [Held by provider] spironolactone (Aldactone) tab 25 mg, 25 mg, Oral, Daily    traMADol (Ultram) tab 50 mg, 50 mg, Oral, Q6H PRN    polyethylene glycol (PEG 3350) (Miralax) 17 g oral packet 17 g, 17 g, Oral, Daily PRN    magnesium hydroxide (Milk of Magnesia) 400 MG/5ML oral suspension 30 mL, 30 mL, Oral, Daily PRN    bisacodyl (Dulcolax) 10 MG rectal suppository 10 mg, 10 mg, Rectal, Daily PRN    fleet enema (Fleet) 7-19 GM/118ML rectal enema 133 mL, 1 enema, Rectal, Once PRN    amitriptyline (Elavil) tab 25 mg, 25 mg, Oral, Nightly    sacubitril-valsartan (Entresto) 24-26 MG per tab 1 tablet, 1 tablet, Oral, BID    ferrous sulfate DR tab 325 mg, 325 mg, Oral, Daily with breakfast    folic acid (Folvite) tab 800 mcg, 800 mcg, Oral, Daily    HYDROcodone-acetaminophen (Norco)  MG per tab 1 tablet, 1 tablet, Oral, Q6H PRN    pantoprazole (Protonix) DR tab 40 mg, 40 mg, Oral, BID AC    alendronate (Fosamax) tab 70 mg, 70 mg, Oral, Weekly    Objective     Vital Signs:  Blood pressure (!) 161/91, pulse 65, temperature 98.5 °F (36.9 °C), temperature source Temporal, resp. rate 16, weight 156 lb 1.4 oz (70.8 kg), SpO2 98%.  Body mass index is 25.97 kg/m².  Present Level of pain: 0/10  Non-verbal signs of pain present: No    Physical Exam:  General: Alert and in no apparent distress. Weak appearing  HEENT: No focal deficits.  Cardiac: Regular rate and rhythm, S1, S2 normal, no murmur, rub or gallop.  Lungs: Clear without wheezes, rales, rhonchi or dullness.  Normal excursions and  effort.  Abdomen: Soft, non-tender, normal bowel sounds X 4 quadrants, no rebound or guarding  Extremities: Without clubbing, cyanosis. Peripheral pulses are 2+. BLE Edema not present, severe hand deformities r/t RA  Neurologic: Alert and oriented X4, follows commands  Skin: Warm and dry.    Prior to admission Palliative performance scale PPSv2 (%): 60    Current PPS 30%    Hematology:  Lab Results   Component Value Date    WBC 4.4 02/14/2024    HGB 8.9 (L) 02/14/2024    HCT 26.9 (L) 02/14/2024    PLT 89.0 (L) 02/14/2024       Coags:  Lab Results   Component Value Date    INR 1.25 (H) 02/09/2024    PTT 35.2 02/09/2024       Chemistry:  Lab Results   Component Value Date    CREATSERUM 1.52 (H) 02/14/2024    BUN 31 (H) 02/14/2024     02/14/2024    K 3.4 (L) 02/14/2024     02/14/2024    CO2 27.0 02/14/2024     (H) 02/14/2024    CA 8.7 02/14/2024    ALB 3.0 (L) 02/12/2024    ALKPHO 147 (H) 02/12/2024    BILT 1.9 (H) 02/12/2024    TP 5.8 02/12/2024     (H) 02/12/2024    ALT 29 02/12/2024    MG 1.9 02/14/2024    PHOS 2.7 02/09/2024       Imaging:  XR CHEST AP PORTABLE  (CPT=71045)    Result Date: 2/14/2024  CONCLUSION:  1. Cardiomegaly.  Prominent central vasculature. 2. Tortuous thoracic aorta.  Cardiac valve prosthesis. 3. Bilateral perihilar and lower lobe parenchymal opacification most pronounced at the left base with suspected bilateral effusions.  Partial clearing right basilar pleural parenchymal abnormality    Dictated by (CST): Justin Stark MD on 2/14/2024 at 8:08 AM     Finalized by (CST): Justin Stark MD on 2/14/2024 at 8:11 AM          XR CHEST AP PORTABLE  (CPT=71045)    Result Date: 2/13/2024  CONCLUSION:  1. Essentially unchanged chest with redemonstration of moderate cardiomegaly and mild pulmonary congestive changes about the same.  Persistent bibasilar opacification suggesting probable pleural effusions, but I cannot exclude underlying bibasilar pneumonia and or  atelectasis.  Findings are about the same.    Dictated by (CST): Josue Hogan MD on 2/13/2024 at 9:44 AM     Finalized by (CST): Josue Hogan MD on 2/13/2024 at 9:47 AM          XR CHEST AP PORTABLE  (CPT=71045)    Result Date: 2/12/2024  CONCLUSION:  1. Moderate cardiomegaly.  Pulmonary venous distention.  Heart valve prosthesis. 2. Moderate perihilar lower lobe mixed alveolar and interstitial airspace opacification with bilateral effusions suggesting pulmonary congestive changes.  Slight progression right lung base.    Dictated by (CST): Justin Stark MD on 2/12/2024 at 7:51 AM     Finalized by (CST): Justin Stark MD on 2/12/2024 at 7:54 AM           2/10/24 Echocardiogram  Conclusions:     1. Left ventricle: The cavity size was normal. Wall thickness was normal.      Systolic function was normal. The estimated ejection fraction was 15-20%,      by biplane method of disks. Doppler parameters are consistent with an      irreversible restrictive pattern, indicative of decreased left      ventricular diastolic compliance and/or increased left atrial pressure -      grade 4 diastolic dysfunction.   2. Right ventricle: The cavity size was markedly increased. Pacer wire noted      in the right ventricle. Although right ventricular systolic pressure was      not increased relative to right atrial pressure, the right atrial      pressure would appear to be markedly elevated as a result of wide-open      tricuspid regurgitation. Absolute RV systolic pressure was, therefore, at      least moderately increased.   3. Ventricular septum: Septal motion was dyssynergic.   4. Left atrium: The atrium was increased in size. Echodensity seen in LA.   5. Right atrium: The atrium was markedly dilated. Pacer wire noted in right      atrium.   6. Mitral valve: A bioprosthesis is present and functioning normally.   7. Tricuspid valve: There was malcoaptation of the valve leaflets. There was      wide-open regurgitation    Follow up [facility] Discussion    2/14/24-Provided clinical update to pt and she is hopeful she will continue to improve with ongoing supportive care. She knows she will need CÉSAR on dc and discussed recommendation for community palliative care program to follow as well. I encouraged her to maintain hope and positive attitude which will help with her recovery.     I also called her dtr Barbi with update and reiterated above. She is interested in knowing plan from cardiology on inotropic therapy as outpt. She is coping adequately and appreciative of the call.     2/13/24-I spoke with pt about her clinical condition. She tells me she is concerned about her poor health and says she doesn't want to suffer. She tells me yesterday she had many family members come to visit with her and says she is trying to get her affairs in order with help with her dtr Barbi. I readdressed code status again with pt in the setting of end stage CHF. Pt is contemplating making herself DNAR, but wants to talk with her first before making this decision. She will remain full code for now and wants to continue with supportive care.    I also called her dtr Barbi with clinical update. She talked with me about their discussions yesterday and she is coming back today to talk with her mother further. Barbi tells me the MDs told her mother about her poor prognosis yesterday. Barbi doesn't want her mother to suffer if things were to worsen and she will come to talk further with her about code status today. She will call me after their discussion and told her I can come back to meet with them. Provided emotional support to pt/dtr who are coping adequately.     Addendum 2p-I followed back with pt when her dtr Barbi came to visit. Pt had other family also come to visit with her. They did discuss further her code status and pt has expressed wishes for DNAR/DNI-selective and continue supportive care. I met with pt's dtr Barbi in conference room  and we talked about her clinical picture. She is not sure pt would want to have long term palliative inotropic therapy and I encouraged further discussion with cardiology about this. POLST form was completed. They also know about option for comfort care with hospice if her condition worsens. She is not ready for this now and wants to continue with supportive care. GOC discussions will be ongoing.     2/12/24-Provided clinical update to pt. Talked with her about worsening heart failure/cardiogenic shock/GEORGE. We addressed her code status and pt wants to remain full code and continue with supportive care.    Dr. Barry and I also called her dtr Barbi who is POA. We provided clinical overview and deterioration over the weekend. We stressed pt's overall poor prognosis and discussed end stage CHF with her. Discussed pt would not be dialysis candidate if renal function worsens. Barbi understands how ill pt is currently and she will be coming in to see her later today to have more discussions with her mother. Encouraged setting limits towards DNAR. Will continue supportive care and pt/family will need ongoing discussions through clinical course.     2/9/24-I met with pt and her family including dtr Barbi and  Low. I provided clinical overview. I also reinforced benefits of palliative care. I discussed the normal disease trajectory of CHF, concerns with ongoing pancytopenia/thrombocytopenia, DVT off A/C d/t thrombocytopenia, recent GIB, AFib with recommendation for w/u for watchman as outpt. She understands she will be at risk for recurrent hospitalizations and would be ok with being re-hospitalized again. Pt wants to continue with supportive care and we discussed plan for CÉSAR on dc. I recommended having a community palliative care program follow on dc which they seem open to. Discussed ongoing GOC discussions will be needed over time.     I also readdressed the importance of ACP prior to crisis. Pt was  agreeable to talking about advance directives today. I reviewed HCPOA paperwork with pt in detail. She expressed wanting her dtr Barbi to be to be her primary HCPOA and  as secondary POA. HCPOA paperwork completed with pt. I also addressed her full code status and discussed the risks vs benefits of life sustaining treatments in the setting of her clinical picture and encouraged setting limits. Pt expressed wanting to remain full code. She tells us she would not want prolonged heroic measures taken or if she was in vegetative state, but would want resuscitation attempted. I provided blank POLST form for their reference for the future.     Provided emotional support to pt/family who are coping adequately.       Discussed with Patient and Family: Yes  Patient's preference about sharing medical information: speak to pt and family  Patient's decision making preferences: speak to Pt  Code status: DNAR/DNI-selective  Have advanced directives been discussed with patient or healthcare power of : Yes        Healthcare Agent Appointed: Yes  Healthcare Agent's Name: Barbi Silverio  Healthcare Agent's Phone Number: [phone]          Spiritual needs addressed: Patient/family declined Spiritual Care    Palliative disposition: Community Palliative Care        Palliative Care Assessment and Recommendations     Problem List:     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  -Continue Miralax prn and ducolax supp prn  -Education provided on adequately bowel regimen while taking opioids.       Goals of care counseling  -see above for details  -Confirmed wishes for DNAR/DNI-selective and continue supportive care.  -Pt/dtr Barbi will need further discussion with cardiology about ongoing palliative inotropic therapy as outpatient.  -Pt/dtr Barbi are aware of poor prognosis and she is not candidate for dialysis if renal function worsens.  -Ongoing GOC discussions will be needed through clinical course and over time.  -Agreeable to palliative care following  -Dispo:  TBD pending clinical course. If improves, CÉSAR with [facility] palliative care program to follow on dc. SW to help with dc planning.   -Provided emotional support to pt/family who are coping adequately.     Advance care planning  -see above for details  -Pt's dtr Barbi Silverio is primary HCPOA #[phone].  -Previously completed POLST/ HCPOA paperwork in EPIC.     Palliative Performance Scale 30%     Emotion support provided to patient/family today: Yes     A total of 25 mins were spent on this consult, which included all of the following: direct face to face contact, history taking, physical examination, and >50% was spent counseling and coordinating care.    Discussed today's visit with Thierno GIL.    I am out of the office tomorrow and will follow back on Friday. Please contact my partners at [phone] for any arising palliative care needs in my absence.    Aparna Mahan, ANP-BC, [facility] [id number]  2/14/2024  10:10 AM  Palliative Care Services

## 2024-02-14 NOTE — PLAN OF CARE
Palliative is now following as patient is end-stage heart failure. Patient on Milrinone/Dobutamine and Bumex drip. O2 at 3 liters per high flow cannula. O2 sat ranges from 99- 100%.      Problem: Patient Centered Care  Goal: Patient preferences are identified and integrated in the patient's plan of care  Description: Interventions:  - What would you like us to know as we care for you? I live with my  and two grandchildren  - Provide timely, complete, and accurate information to patient/family  - Incorporate patient and family knowledge, values, beliefs, and cultural backgrounds into the planning and delivery of care  - Encourage patient/family to participate in care and decision-making at the level they choose  - Honor patient and family perspectives and choices  Outcome: Progressing     Problem: Patient/Family Goals  Goal: Patient/Family Long Term Goal  Description: Patient's Long Term Goal: To get better    Interventions:  - IV diuretics, prn O2 supplementation, cardiac monitoring and electrolyte protocol  - See additional Care Plan goals for specific interventions  Outcome: Progressing  Goal: Patient/Family Short Term Goal  Description: Patient's Short Term Goal: To breathe better    Interventions:   - IV diuretics, O2 supplementation prn, cardiac monitoring, electrolyte protocol  - See additional Care Plan goals for specific interventions  Outcome: Progressing     Problem: CARDIOVASCULAR - ADULT  Goal: Maintains optimal cardiac output and hemodynamic stability  Description: INTERVENTIONS:  - Monitor vital signs, rhythm, and trends  - Monitor for bleeding, hypotension and signs of decreased cardiac output  - Evaluate effectiveness of vasoactive medications to optimize hemodynamic stability  - Monitor arterial and/or venous puncture sites for bleeding and/or hematoma  - Assess quality of pulses, skin color and temperature  - Assess for signs of decreased coronary artery perfusion - ex. Angina  - Evaluate  fluid balance, assess for edema, trend weights  Outcome: Progressing  Goal: Absence of cardiac arrhythmias or at baseline  Description: INTERVENTIONS:  - Continuous cardiac monitoring, monitor vital signs, obtain 12 lead EKG if indicated  - Evaluate effectiveness of antiarrhythmic and heart rate control medications as ordered  - Initiate emergency measures for life threatening arrhythmias  - Monitor electrolytes and administer replacement therapy as ordered  Outcome: Progressing     Problem: RESPIRATORY - ADULT  Goal: Achieves optimal ventilation and oxygenation  Description: INTERVENTIONS:  - Assess for changes in respiratory status  - Assess for changes in mentation and behavior  - Position to facilitate oxygenation and minimize respiratory effort  - Oxygen supplementation based on oxygen saturation or ABGs  - Provide Smoking Cessation handout, if applicable  - Encourage broncho-pulmonary hygiene including cough, deep breathe, Incentive Spirometry  - Assess the need for suctioning and perform as needed  - Assess and instruct to report SOB or any respiratory difficulty  - Respiratory Therapy support as indicated  - Manage/alleviate anxiety  - Monitor for signs/symptoms of CO2 retention  Outcome: Progressing     Problem: GENITOURINARY - ADULT  Goal: Absence of urinary retention  Description: INTERVENTIONS:  - Assess patient’s ability to void and empty bladder  - Monitor intake/output and perform bladder scan as needed  - Follow urinary retention protocol/standard of care  - Consider collaborating with pharmacy to review patient's medication profile  - Implement strategies to promote bladder emptying  Outcome: Progressing

## 2024-02-14 NOTE — PROGRESS NOTES
Health system - CARDIOLOGY PROGRESS NOTE  Margaret Silverio Patient Status:  Inpatient    3/14/1945 MRN [id number]   Location Health system 2W/SW Attending Raiza Alvarez MD   Hosp Day # 11 PCP Johnathon Rodriguez DO     Assessment:    1.  Cardiogenic shock.  Blood pressure and renal function/urine output improved on inotropes.  Good distal perfusion.  Overall is improved.  Stable blood pressure    2.  Severe left ventricular dysfunction.    3.  Hypertension.    Plan:    Increase dose of Entresto, hydralazine    Wean off inotropes.    Changed to IV Bumex bolus dosing.      Subjective:  No chest pain or shortness of breath.    Objective:  BP (!) 161/91 (BP Location: Right arm)   Pulse 65   Temp 98.5 °F (36.9 °C) (Temporal)   Resp 16   Wt 156 lb 1.4 oz (70.8 kg)   SpO2 98%   BMI 25.97 kg/m²     Temp (24hrs), Av.7 °F (37.1 °C), Min:97.7 °F (36.5 °C), Max:99.9 °F (37.7 °C)      Intake/Output:    Intake/Output Summary (Last 24 hours) at 2024 1033  Last data filed at 2024 0559  Gross per 24 hour   Intake 1108.62 ml   Output 1875 ml   Net -766.38 ml       Wt Readings from Last 2 Encounters:   24 156 lb 1.4 oz (70.8 kg)   24 153 lb 11.2 oz (69.7 kg)       Physical Exam:    General: Alert and oriented x 3,  No apparent distress.  HEENT: No focal deficits.  Neck: + JVD, carotids 2+ no bruits.  Cardiac: Regular rate and rhythm, S1, S2 normal, 6 HSM  Lungs: Clear without wheezes, rales, rhonchi.  Normal excursions and effort.  Abdomen: Soft, non-tender.   Extremities: Without clubbing, cyanosis or edema.  Peripheral pulses are 2+.  Skin: Warm and dry.     Labs:  Lab Results   Component Value Date    WBC 4.4 2024    HGB 8.9 2024    HCT 26.9 2024    PLT 89.0 2024     Lab Results   Component Value Date    INR 1.25 (H) 2024     Lab Results   Component Value Date      02/14/2024    K 3.4 02/14/2024     02/14/2024    CO2 27.0 02/14/2024    BUN 31 02/14/2024    CREATSERUM 1.52 02/14/2024     02/14/2024    MG 1.9 02/14/2024    CA 8.7 02/14/2024        No results found for: \"TROP\", \"CKMB\"     Medications:     potassium chloride  40 mEq Oral Q4H    sodium chloride   Intravenous Once    senna-docusate  2 tablet Oral Daily    isosorbide dinitrate  5 mg Oral TID (Nitrates)    hydrALAZINE  10 mg Oral Q8H BROOKLYNN    sodium chloride   Intravenous Once    meropenem  500 mg Intravenous q12h    amiodarone  200 mg Oral Daily    [Held by provider] spironolactone  25 mg Oral Daily    amitriptyline  25 mg Oral Nightly    sacubitril-valsartan  1 tablet Oral BID    ferrous sulfate  325 mg Oral Daily with breakfast    folic acid  800 mcg Oral Daily    pantoprazole  40 mg Oral BID AC    alendronate  70 mg Oral Weekly      bumetanide (Bumex) 12.5 mg in 50 mL infusion 0.25 mg/hr (02/13/24 2200)    DOBUTamine 5 mcg/kg/min (02/13/24 2200)    milrinone in dextrose 5% 0.25 mcg/kg/min (02/13/24 2200)       Theodore Lr MD  2/14/2024  10:33 AM

## 2024-02-14 NOTE — PROGRESS NOTES
[facility]    Progress Note      Assessment and Plan:   1.  Acute on chronic respiratory failure-much better clinically on milrinone and dobutamine for cardiogenic shock.  No longer requiring PAP therapy.  The lactic acid is markedly improved.    The patient is breathing much better and the x-ray is improved.  I think it is okay to stop empiric antibiotic.  She is afebrile and the white blood cell count is normal.    Recommendations:  1.  Dual inotropes and as per cardiology  2.  PAP therapy as needed  3.  Morning chest x-ray, CBC and electrolytes  4.  Will follow clinically.  5.  Stop meropenem    2.  Thrombocytopenia-as per hematology.  Platelets given yesterday.    3.  Cardiomyopathy-as per cardiology    4.  DVT prophylaxis-SCDs temporarily in patient with thrombocytopenia.    5.  Anemia-hemoglobin is now 8.9.  Will follow.    Subjective:   Margaret Silverio is a(n) 78 year old female who is breathing more comfortably and is off PAP therapy    Objective:   Blood pressure (!) 161/91, pulse 65, temperature 98.5 °F (36.9 °C), temperature source Temporal, resp. rate 16, weight 156 lb 1.4 oz (70.8 kg), SpO2 98%.    Physical Exam easily arousable black female  HEENT examination is unremarkable with pupils equal round and reactive to light and accommodation.   Neck without adenopathy, thyromegaly, JVD nor bruit.   Lungs diminished with basilar crackles to auscultation and percussion.  Cardiac regular rate and rhythm 1 out of 6 systolic ejection murmur.   Abdomen nontender, without hepatosplenomegaly and no mass appreciable.   Extremities without clubbing cyanosis nor edema.   Neurologic grossly intact with symmetric tone and strength and reflex.  Skin without gross abnormality     Results:     Lab Results   Component Value Date    WBC 4.4 02/14/2024    HGB 8.9 02/14/2024    HCT 26.9 02/14/2024    PLT 89.0 02/14/2024    CREATSERUM 1.52 02/14/2024    BUN 31 02/14/2024     02/14/2024    K 3.4  02/14/2024     02/14/2024    CO2 27.0 02/14/2024     02/14/2024    CA 8.7 02/14/2024    MG 1.9 02/14/2024     Chest x-ray-partial clearing of the right basilar haziness    Aroldo Pacheco MD  Medical Director, Critical Care, [facility]  Medical Director, [facility]  Pager: [phone]

## 2024-02-14 NOTE — PLAN OF CARE
Patient stable. No acute changes. Bumex drip stopped and switched to IVP.  Started to wean dobutamine. Milrinone drip continued at ordered rate of 0.25mcg/kg/min. Patient has a poor appetite. Potassium replaced. Family at the bedside during the shift.     Problem: Patient Centered Care  Goal: Patient preferences are identified and integrated in the patient's plan of care  Description: Interventions:  - What would you like us to know as we care for you? I live with my  and two grandchildren  - Provide timely, complete, and accurate information to patient/family  - Incorporate patient and family knowledge, values, beliefs, and cultural backgrounds into the planning and delivery of care  - Encourage patient/family to participate in care and decision-making at the level they choose  - Honor patient and family perspectives and choices  Outcome: Progressing     Problem: Patient/Family Goals  Goal: Patient/Family Long Term Goal  Description: Patient's Long Term Goal: To get better    Interventions:  - IV diuretics, prn O2 supplementation, cardiac monitoring and electrolyte protocol  - See additional Care Plan goals for specific interventions  Outcome: Progressing  Goal: Patient/Family Short Term Goal  Description: Patient's Short Term Goal: To breathe better    Interventions:   - IV diuretics, O2 supplementation prn, cardiac monitoring, electrolyte protocol  - See additional Care Plan goals for specific interventions  Outcome: Progressing     Problem: CARDIOVASCULAR - ADULT  Goal: Maintains optimal cardiac output and hemodynamic stability  Description: INTERVENTIONS:  - Monitor vital signs, rhythm, and trends  - Monitor for bleeding, hypotension and signs of decreased cardiac output  - Evaluate effectiveness of vasoactive medications to optimize hemodynamic stability  - Monitor arterial and/or venous puncture sites for bleeding and/or hematoma  - Assess quality of pulses, skin color and temperature  - Assess for  signs of decreased coronary artery perfusion - ex. Angina  - Evaluate fluid balance, assess for edema, trend weights  Outcome: Progressing  Goal: Absence of cardiac arrhythmias or at baseline  Description: INTERVENTIONS:  - Continuous cardiac monitoring, monitor vital signs, obtain 12 lead EKG if indicated  - Evaluate effectiveness of antiarrhythmic and heart rate control medications as ordered  - Initiate emergency measures for life threatening arrhythmias  - Monitor electrolytes and administer replacement therapy as ordered  Outcome: Progressing     Problem: RESPIRATORY - ADULT  Goal: Achieves optimal ventilation and oxygenation  Description: INTERVENTIONS:  - Assess for changes in respiratory status  - Assess for changes in mentation and behavior  - Position to facilitate oxygenation and minimize respiratory effort  - Oxygen supplementation based on oxygen saturation or ABGs  - Provide Smoking Cessation handout, if applicable  - Encourage broncho-pulmonary hygiene including cough, deep breathe, Incentive Spirometry  - Assess the need for suctioning and perform as needed  - Assess and instruct to report SOB or any respiratory difficulty  - Respiratory Therapy support as indicated  - Manage/alleviate anxiety  - Monitor for signs/symptoms of CO2 retention  Outcome: Progressing     Problem: GENITOURINARY - ADULT  Goal: Absence of urinary retention  Description: INTERVENTIONS:  - Assess patient’s ability to void and empty bladder  - Monitor intake/output and perform bladder scan as needed  - Follow urinary retention protocol/standard of care  - Consider collaborating with pharmacy to review patient's medication profile  - Implement strategies to promote bladder emptying  Outcome: Progressing     Problem: Impaired Functional Mobility  Goal: Achieve highest/safest level of mobility/gait  Description: Interventions:  - Assess patient's functional ability and stability  - Promote increasing activity/tolerance for mobility  and gait  - Educate and engage patient/family in tolerated activity level and precautions  - Recommend use of total lift for transfers  Outcome: Progressing

## 2024-02-14 NOTE — PROGRESS NOTES
[facility]    Progress Note    Margaret Silverio Patient Status:  Inpatient    3/14/1945 MRN [id number]   Location [facility] 2W/SW Attending Raiza Alvarez MD   Hosp Day # 10 PCP Johnathon Rodriguez, DO       Subjective:   Margaret Silverio is a(n) 78 year old female     ROS:     Constitutional: Very tired lethargic but awake  ENMT:  Negative for ear drainage, hearing loss and nasal drainage  Eyes:  Negative for eye discharge and vision loss  Cardiovascular:  Negative for chest pain, sob, irregular heartbeat/palpitations  Respiratory: No shortness of breath at rest but no exertion at all  Gastrointestinal: Appetite poor  Genitourinary:  Negative for dysuria and hematuria  Endocrine:  Negative for abnormal sleep patterns, increased activity, polydipsia and polyphagia  Hema/Lymph:  Negative for easy bleeding and easy bruising  Integumentary:  Negative for pruritus and rash  Musculoskeletal:  Negative for bone/joint symptoms  Neurological:  Negative for gait disturbance  Psychiatric:  Negative for inappropriate interaction and psychiatric symptoms      Vitals:    24 1630   BP: 149/63   Pulse: 69   Resp: 17   Temp:            PHYSICAL EXAM:   Constitutional: appears well hydrated alert and responsive no acute distress noted  Head/Face: normocephalic  Eyes/Vision: normal extraocular motion is intact  Nose/Mouth/Throat:mucous membranes are moist and no oral lesions are noted  Neck/Thyroid: neck is supple without adenopathy  Lymphatic: no abnormal cervical, supraclavicular adenopathy is noted  Respiratory: Diminished breath sounds but do not hear any rales or rhonchi  Cardiovascular: regular rate and rhythm no murmurs, gallups, or rubs  Abdomen: soft, non-tender, non-distended, BS normal  Vascular: well perfused femoral, and pedal pulses normal  Skin/Hair: no unusual rashes present, no abnormal bruising noted  Back/Spine: no abnormalities noted  Musculoskeletal: full ROM all  extremities good strength  no deformities  Extremities: Plus edema  Neurological:  Grossly normal    Results:     Laboratory Data:  Lab Results   Component Value Date    WBC 4.3 02/13/2024    HGB 7.2 (L) 02/13/2024    HCT 21.9 (L) 02/13/2024    PLT 44.0 (L) 02/13/2024    CREATSERUM 1.61 (H) 02/13/2024    BUN 29 (H) 02/13/2024     02/13/2024    K 3.5 02/13/2024     02/13/2024    CO2 24.0 02/13/2024     (H) 02/13/2024    CA 8.8 02/13/2024    ALB 3.0 (L) 02/12/2024    ALKPHO 147 (H) 02/12/2024    BILT 1.9 (H) 02/12/2024    TP 5.8 02/12/2024     (H) 02/12/2024    ALT 29 02/12/2024    PTT 35.2 02/09/2024    INR 1.25 (H) 02/09/2024    T4F 1.2 02/03/2024    TSH 10.681 (H) 02/03/2024    LIP 32 01/13/2024    MG 2.0 02/07/2024    PHOS 2.7 02/09/2024    B12 >2,000 (H) 02/03/2024       Imaging:  [unfilled]   XR CHEST AP PORTABLE  (CPT=71045)    Result Date: 2/13/2024  CONCLUSION:  1. Essentially unchanged chest with redemonstration of moderate cardiomegaly and mild pulmonary congestive changes about the same.  Persistent bibasilar opacification suggesting probable pleural effusions, but I cannot exclude underlying bibasilar pneumonia and or atelectasis.  Findings are about the same.    Dictated by (CST): Josue Hogan MD on 2/13/2024 at 9:44 AM     Finalized by (CST): Josue Hogan MD on 2/13/2024 at 9:47 AM          XR CHEST AP PORTABLE  (CPT=71045)    Result Date: 2/12/2024  CONCLUSION:  1. Moderate cardiomegaly.  Pulmonary venous distention.  Heart valve prosthesis. 2. Moderate perihilar lower lobe mixed alveolar and interstitial airspace opacification with bilateral effusions suggesting pulmonary congestive changes.  Slight progression right lung base.    Dictated by (CST): Justin Stark MD on 2/12/2024 at 7:51 AM     Finalized by (CST): Justin Stark MD on 2/12/2024 at 7:54 AM             ASSESSMENT/PLAN:   Assessment       #1 severe cardiomyopathy is on dobutamine and  milrinone     #2 heart failure x-rays show CHF is on Bumex drip  Urine output better 1000 cc today  #3 anemia hemoglobin 7.2 to get blood today  #4 thrombocytopenia 44,000 continue to watch  #5 GEORGE creatinine a little better 1.61 replace potassium    #6 elevated LFTs most likely due to shock and decreased cardiac perfusion    Prognosis poor consider palliative care discussed with daughter  2/13/2024  Ayush Key MD

## 2024-02-15 LAB
ANION GAP SERPL CALC-SCNC: 7 MMOL/L (ref 0–18)
BASOPHILS # BLD: 0 X10(3) UL (ref 0–0.2)
BASOPHILS NFR BLD: 0 %
BUN BLD-MCNC: 29 MG/DL (ref 9–23)
BUN/CREAT SERPL: 20.6 (ref 10–20)
CALCIUM BLD-MCNC: 8.8 MG/DL (ref 8.7–10.4)
CHLORIDE SERPL-SCNC: 103 MMOL/L (ref 98–112)
CO2 SERPL-SCNC: 26 MMOL/L (ref 21–32)
CREAT BLD-MCNC: 1.41 MG/DL
DEPRECATED RDW RBC AUTO: 55.8 FL (ref 35.1–46.3)
EGFRCR SERPLBLD CKD-EPI 2021: 38 ML/MIN/1.73M2 (ref 60–?)
EOSINOPHIL # BLD: 0.04 X10(3) UL (ref 0–0.7)
EOSINOPHIL NFR BLD: 1 %
ERYTHROCYTE [DISTWIDTH] IN BLOOD BY AUTOMATED COUNT: 21.4 % (ref 11–15)
GLUCOSE BLD-MCNC: 102 MG/DL (ref 70–99)
HCT VFR BLD AUTO: 33.2 %
HGB BLD-MCNC: 10.9 G/DL
LACTATE SERPL-SCNC: 1.4 MMOL/L (ref 0.5–2)
LYMPHOCYTES NFR BLD: 0.32 X10(3) UL (ref 1–4)
LYMPHOCYTES NFR BLD: 8 %
MAGNESIUM SERPL-MCNC: 1.8 MG/DL (ref 1.6–2.6)
MCH RBC QN AUTO: 29.6 PG (ref 26–34)
MCHC RBC AUTO-ENTMCNC: 32.8 G/DL (ref 31–37)
MCV RBC AUTO: 90.2 FL
METAMYELOCYTES # BLD: 0.04 X10(3) UL
METAMYELOCYTES NFR BLD: 1 %
MONOCYTES # BLD: 0.4 X10(3) UL (ref 0.1–1)
MONOCYTES NFR BLD: 10 %
MYELOCYTES # BLD: 0.08 X10(3) UL
MYELOCYTES NFR BLD: 2 %
NEUTROPHILS # BLD AUTO: 2.47 X10 (3) UL (ref 1.5–7.7)
NEUTROPHILS NFR BLD: 77 %
NEUTS HYPERSEG # BLD: 3.08 X10(3) UL (ref 1.5–7.7)
OSMOLALITY SERPL CALC.SUM OF ELEC: 288 MOSM/KG (ref 275–295)
PLATELET # BLD AUTO: 191 10(3)UL (ref 150–450)
PLATELET MORPHOLOGY: NORMAL
POTASSIUM SERPL-SCNC: 3.8 MMOL/L (ref 3.5–5.1)
POTASSIUM SERPL-SCNC: 3.8 MMOL/L (ref 3.5–5.1)
PROMYELOCYTES # BLD: 0.04 X10(3) UL
PROMYELOCYTES NFR BLD: 1 %
RBC # BLD AUTO: 3.68 X10(6)UL
SODIUM SERPL-SCNC: 136 MMOL/L (ref 136–145)
TOTAL CELLS COUNTED BLD: 100
WBC # BLD AUTO: 4 X10(3) UL (ref 4–11)

## 2024-02-15 PROCEDURE — 99233 SBSQ HOSP IP/OBS HIGH 50: CPT | Performed by: HOSPITALIST

## 2024-02-15 PROCEDURE — 99233 SBSQ HOSP IP/OBS HIGH 50: CPT | Performed by: INTERNAL MEDICINE

## 2024-02-15 RX ORDER — POTASSIUM CHLORIDE 20 MEQ/1
40 TABLET, EXTENDED RELEASE ORAL ONCE
Status: COMPLETED | OUTPATIENT
Start: 2024-02-15 | End: 2024-02-15

## 2024-02-15 RX ORDER — MAGNESIUM OXIDE 400 MG/1
400 TABLET ORAL ONCE
Status: COMPLETED | OUTPATIENT
Start: 2024-02-15 | End: 2024-02-15

## 2024-02-15 RX ORDER — ISOSORBIDE DINITRATE 10 MG/1
10 TABLET ORAL
Status: DISCONTINUED | OUTPATIENT
Start: 2024-02-15 | End: 2024-02-18

## 2024-02-15 NOTE — PROGRESS NOTES
[facility]    Progress Note    Margaret Silverio Patient Status:  Inpatient    3/14/1945 MRN [id number]   Location [facility] 3W/SW Attending Rosalinda Barry DO   Hosp Day # 12 PCP Johnathon Rodriguez DO     Chief complaint chf     Subjective:   Margaret Silverio is feeling well. No sob.       Objective:   Blood pressure 150/90, pulse 64, temperature 97.9 °F (36.6 °C), temperature source Temporal, resp. rate 16, weight 152 lb 5.4 oz (69.1 kg), SpO2 95%.      Intake/Output Summary (Last 24 hours) at 2/15/2024 1447  Last data filed at 2/15/2024 1300  Gross per 24 hour   Intake 450.5 ml   Output 1250 ml   Net -799.5 ml       Patient Weight(s) for the past 336 hrs:   Weight   02/15/24 0600 152 lb 5.4 oz (69.1 kg)   24 0559 156 lb 1.4 oz (70.8 kg)   24 0600 158 lb 11.7 oz (72 kg)   24 0800 149 lb 14.6 oz (68 kg)   24 0000 148 lb 13 oz (67.5 kg)   24 0800 147 lb 11.3 oz (67 kg)   24 0503 147 lb 14.4 oz (67.1 kg)   24 0434 149 lb (67.6 kg)   24 0457 149 lb 8 oz (67.8 kg)   24 1108 150 lb 14.4 oz (68.4 kg)   24 0400 149 lb 6.4 oz (67.8 kg)   24 0635 148 lb 14.4 oz (67.5 kg)   24 0453 149 lb 9.6 oz (67.9 kg)   24 0032 149 lb 14.4 oz (68 kg)   24 2053 135 lb (61.2 kg)           General appearance: Alert and oriented x person and place   Pulmonary:  CTA , decreased at bases   Cardiovascular: S1, S2 normal, no murmur, click, rub or gallop, regular rate and rhythm  Abdominal: soft, non-tender; bowel sounds normal; no masses,  no organomegaly  Extremities: extremities normal, atraumatic, no cyanosis or edema        Medicines:           Lab Results   Component Value Date    WBC 4.0 02/15/2024    HGB 10.9 (L) 02/15/2024    HCT 33.2 (L) 02/15/2024    .0 02/15/2024    CREATSERUM 1.41 (H) 02/15/2024    BUN 29 (H) 02/15/2024     02/15/2024    K 3.8 02/15/2024    K 3.8 02/15/2024     02/15/2024    CO2  26.0 02/15/2024     (H) 02/15/2024    CA 8.8 02/15/2024    ALB 3.0 (L) 02/12/2024    ALKPHO 147 (H) 02/12/2024    BILT 1.9 (H) 02/12/2024    TP 5.8 02/12/2024     (H) 02/12/2024    ALT 29 02/12/2024    PTT 35.2 02/09/2024    INR 1.25 (H) 02/09/2024    T4F 1.2 02/03/2024    TSH 10.681 (H) 02/03/2024    LIP 32 01/13/2024    MG 1.8 02/15/2024    PHOS 2.7 02/09/2024    B12 >2,000 (H) 02/03/2024       XR CHEST AP PORTABLE  (CPT=71045)    Result Date: 2/14/2024  CONCLUSION:  1. Cardiomegaly.  Prominent central vasculature. 2. Tortuous thoracic aorta.  Cardiac valve prosthesis. 3. Bilateral perihilar and lower lobe parenchymal opacification most pronounced at the left base with suspected bilateral effusions.  Partial clearing right basilar pleural parenchymal abnormality    Dictated by (CST): Justin Stark MD on 2/14/2024 at 8:08 AM     Finalized by (CST): Justin Stark MD on 2/14/2024 at 8:11 AM             Results:     CBC:    Lab Results   Component Value Date    WBC 4.0 02/15/2024    WBC 4.4 02/14/2024    WBC 4.3 02/13/2024     Lab Results   Component Value Date    HGB 10.9 (L) 02/15/2024    HGB 8.9 (L) 02/14/2024    HGB 7.2 (L) 02/13/2024      Lab Results   Component Value Date    .0 02/15/2024    PLT 89.0 (L) 02/14/2024    PLT 44.0 (L) 02/13/2024       Recent Labs   Lab 02/13/24  0559 02/14/24  0524 02/14/24  1610 02/15/24  0440   * 126*  --  102*   BUN 29* 31*  --  29*   CREATSERUM 1.61* 1.52*  --  1.41*   CA 8.8 8.7  --  8.8    137  --  136   K 3.5 3.4* 3.7 3.8  3.8    103  --  103   CO2 24.0 27.0  --  26.0        isosorbide dinitrate  10 mg Oral TID (Nitrates)    sacubitril-valsartan  1 tablet Oral BID    hydrALAZINE  25 mg Oral Q8H BROOKLYNN    bumetanide  1 mg Intravenous BID (Diuretic)    senna-docusate  2 tablet Oral Daily    amiodarone  200 mg Oral Daily    [Held by provider] spironolactone  25 mg Oral Daily    amitriptyline  25 mg Oral Nightly    ferrous sulfate   325 mg Oral Daily with breakfast    folic acid  800 mcg Oral Daily    pantoprazole  40 mg Oral BID AC    alendronate  70 mg Oral Weekly           Assessment and Plan:           Assessment & Plan:     Acute respiratory failure 2/2 cardiogenic shock   Repeat CXR with CHF.   On IV bumex BID- bumex drip discontinued   Apprec pulm and cards consult   Repeat echo with EF 15%, severe TR  Blood cxs sent - ntd. Antibiotics discontinued   Dobutamine discontinued  Wean milrinone per Cards   Strict I/o and daily wts   Monitor creatinine closely which is rising. Renal consulted - apprec input   PAP therapy prn  Morning CXR   Continue on Entresto           2. Pancytopenia/ worsening severe acute TCP:  -had similar issue on last admission Dr Snyder was consulted; thought to be consumptive in seeing of acute illness vs drug induced at the time. No h/o heparin use   - apprec Hem input - continue on prednisone   - haptoglobin decreased and ldh elevated   - Had one unit of blood 2/12   - cont decadron for now. Repeat fibrinogen neg   - supportive transfusion for platelets      3. Paroxysmal atrial fibrillation   Hold oral anticoagulation given recent GI bleed  Currently rate controlled  Still considering outpatient Watchman procedure given her recurrent GI bleeds     4. Chronic kidney injury stage III  - creat stable ; 200 out   - strict I/o's   - daily wts       5. Subclavian clot on icd lead   - May restart AC  if platelet count>50,000 and if ok with GI    6 HTN  -Continue on hydralazine   - Isordil increased to 10 mg PO TID  - Ok to transfer to tele        DVT ppx scds      Discussed with pt's daughter Barbi with [facility]. She understands her heart fx is much worse. Discussed with Dr Lr.   Patient is DNR/Select.       Raiza Alvarez MD         Chart reviewed, including current vitals, notes, labs and imaging  Labs ordered and medications adjusted as outlined above  Coordinate care with care  team/consultants  Discussed with patient results of tests, management plan as outlined above, and the need for ongoing hospitalization  D/w RN     MALLORY high

## 2024-02-15 NOTE — PLAN OF CARE
- On room air. Remains on Milrinone drip.    Problem: Patient Centered Care  Goal: Patient preferences are identified and integrated in the patient's plan of care  Description: Interventions:  - What would you like us to know as we care for you? I live with my  and two grandchildren  - Provide timely, complete, and accurate information to patient/family  - Incorporate patient and family knowledge, values, beliefs, and cultural backgrounds into the planning and delivery of care  - Encourage patient/family to participate in care and decision-making at the level they choose  - Honor patient and family perspectives and choices  Outcome: Progressing     Problem: Patient/Family Goals  Goal: Patient/Family Long Term Goal  Description: Patient's Long Term Goal: To get better    Interventions:  - IV diuretics, prn O2 supplementation, cardiac monitoring and electrolyte protocol  - See additional Care Plan goals for specific interventions  Outcome: Progressing  Goal: Patient/Family Short Term Goal  Description: Patient's Short Term Goal: To breathe better    Interventions:   - IV diuretics, O2 supplementation prn, cardiac monitoring, electrolyte protocol  - See additional Care Plan goals for specific interventions  Outcome: Progressing     Problem: CARDIOVASCULAR - ADULT  Goal: Maintains optimal cardiac output and hemodynamic stability  Description: INTERVENTIONS:  - Monitor vital signs, rhythm, and trends  - Monitor for bleeding, hypotension and signs of decreased cardiac output  - Evaluate effectiveness of vasoactive medications to optimize hemodynamic stability  - Monitor arterial and/or venous puncture sites for bleeding and/or hematoma  - Assess quality of pulses, skin color and temperature  - Assess for signs of decreased coronary artery perfusion - ex. Angina  - Evaluate fluid balance, assess for edema, trend weights  Outcome: Progressing  Goal: Absence of cardiac arrhythmias or at baseline  Description:  INTERVENTIONS:  - Continuous cardiac monitoring, monitor vital signs, obtain 12 lead EKG if indicated  - Evaluate effectiveness of antiarrhythmic and heart rate control medications as ordered  - Initiate emergency measures for life threatening arrhythmias  - Monitor electrolytes and administer replacement therapy as ordered  Outcome: Progressing     Problem: RESPIRATORY - ADULT  Goal: Achieves optimal ventilation and oxygenation  Description: INTERVENTIONS:  - Assess for changes in respiratory status  - Assess for changes in mentation and behavior  - Position to facilitate oxygenation and minimize respiratory effort  - Oxygen supplementation based on oxygen saturation or ABGs  - Provide Smoking Cessation handout, if applicable  - Encourage broncho-pulmonary hygiene including cough, deep breathe, Incentive Spirometry  - Assess the need for suctioning and perform as needed  - Assess and instruct to report SOB or any respiratory difficulty  - Respiratory Therapy support as indicated  - Manage/alleviate anxiety  - Monitor for signs/symptoms of CO2 retention  Outcome: Progressing     Problem: GENITOURINARY - ADULT  Goal: Absence of urinary retention  Description: INTERVENTIONS:  - Assess patient’s ability to void and empty bladder  - Monitor intake/output and perform bladder scan as needed  - Follow urinary retention protocol/standard of care  - Consider collaborating with pharmacy to review patient's medication profile  - Implement strategies to promote bladder emptying  Outcome: Progressing     Problem: Impaired Functional Mobility  Goal: Achieve highest/safest level of mobility/gait  Description: Interventions:  - Assess patient's functional ability and stability  - Promote increasing activity/tolerance for mobility and gait

## 2024-02-15 NOTE — DIETARY NOTE
ADULT NUTRITION INITIAL ASSESSMENT    Pt is at moderate nutrition risk.  Pt does not meet malnutrition criteria.      RECOMMENDATIONS TO MD: See Nutrition Intervention    ADMITTING DIAGNOSIS:  Acute on chronic congestive heart failure, unspecified heart failure type (HCC) [I50.9]  PERTINENT PAST MEDICAL HISTORY:   Past Medical History:   Diagnosis Date    Anemia     Arrhythmia     Arthritis     Deep vein thrombosis (HCC)     Essential hypertension     High blood pressure     History of blood transfusion     Seizure disorder (HCC)     Seizures (HCC)        PATIENT STATUS: Initial 02/15/24: Pt admit for CHF. PMH seen above. Pt was originally screened for LOS on 12/8 at found to be at no nutritional risk. Intakes were adequate and no significant wt loss noted. However, intakes have declined since then and pt eating very poorly now. Pt was receiving Nepro daily but she does not like it. She would like a supplement in the flavor strawberry. She does like Ensure, but requiring a lower potassium ONS, ensure is not appropriate at this time. Added magic cup in strawberry, pt agreeable. Pt states she knows she needs to start eating better to get stronger. Pt very pleasant upon visit. Had eaten small amount of breakfast tray. Also removed cardiac restriction on diet-- kept 2GM Na, low potassium, and 2L fluid restriction.     FOOD/NUTRITION RELATED HISTORY:  Appetite: Poor  Intake:  poor  Intake Meeting Needs: No, but oral nutrition supplements (ONS) to maximize  Percent Meals Eaten (last 6 days)       Date/Time Percent Meals Eaten (%)    02/09/24 0959 100 %    02/12/24 0800 0 %    02/13/24 0910 0 %     Percent Meals Eaten (%): Refused breakfast, consistently poor intake at 02/13/24 0910    02/13/24 1200 2 %     Percent Meals Eaten (%): refused to order, ate 3 spoonfuls of apple sauce and 8oz water at 02/13/24 1200    02/15/24 1100 60 %            Food Allergies: No Known Food Allergies (NKFA)  Cultural/Ethnic/Mosque  Preferences: None    GASTROINTESTINAL: +BM 2/15 and Loss of appetite    MEDICATIONS: reviewed   isosorbide dinitrate  10 mg Oral TID (Nitrates)    sacubitril-valsartan  1 tablet Oral BID    hydrALAZINE  25 mg Oral Q8H BROOKLYNN    bumetanide  1 mg Intravenous BID (Diuretic)    senna-docusate  2 tablet Oral Daily    amiodarone  200 mg Oral Daily    [Held by provider] spironolactone  25 mg Oral Daily    amitriptyline  25 mg Oral Nightly    ferrous sulfate  325 mg Oral Daily with breakfast    folic acid  800 mcg Oral Daily    pantoprazole  40 mg Oral BID AC    alendronate  70 mg Oral Weekly     LABS: reviewed  Recent Labs     02/13/24  0559 02/14/24  0524 02/14/24  1610 02/15/24  0440   * 126*  --  102*   BUN 29* 31*  --  29*   CREATSERUM 1.61* 1.52*  --  1.41*   CA 8.8 8.7  --  8.8   MG  --  1.9  --  1.8    137  --  136   K 3.5 3.4* 3.7 3.8  3.8    103  --  103   CO2 24.0 27.0  --  26.0   OSMOCALC 291 292  --  288       NUTRITION RELATED PHYSICAL FINDINGS:  - Nutrition Focused Physical Exam (NFPE):  Did not completed full NFPE as pt eating breakfast upon visit, but pt visually appears relatively well-nourished.   - Fluid Accumulation:  nonpitting - +1 generalized.  See RN documentation for details  - Skin Integrity: other wounds noted see RN documentation for details    ANTHROPOMETRICS:  HT:    WT: 69.1 kg (152 lb 5.4 oz)   BMI: Body mass index is 25.35 kg/m².  BMI CLASSIFICATION: 25-29.9 kg/m2 - overweight  IBW: 115 lbs        132% IBW  Usual Body Wt: ~148-156 lbs per EMR. Fluctuations to be expected d/t fluid status.      100% UBW    WEIGHT HISTORY:  Patient Weight(s) for the past 336 hrs:   Weight   02/15/24 0600 69.1 kg (152 lb 5.4 oz)   02/14/24 0559 70.8 kg (156 lb 1.4 oz)   02/13/24 0600 72 kg (158 lb 11.7 oz)   02/12/24 0800 68 kg (149 lb 14.6 oz)   02/12/24 0000 67.5 kg (148 lb 13 oz)   02/11/24 0800 67 kg (147 lb 11.3 oz)   02/09/24 0503 67.1 kg (147 lb 14.4 oz)   02/08/24 0434 67.6 kg (149 lb)    02/07/24 0457 67.8 kg (149 lb 8 oz)   02/06/24 1108 68.4 kg (150 lb 14.4 oz)   02/06/24 0400 67.8 kg (149 lb 6.4 oz)   02/05/24 0635 67.5 kg (148 lb 14.4 oz)   02/04/24 0453 67.9 kg (149 lb 9.6 oz)   02/03/24 0032 68 kg (149 lb 14.4 oz)   02/02/24 2053 61.2 kg (135 lb)     Wt Readings from Last 10 Encounters:   02/15/24 69.1 kg (152 lb 5.4 oz)   01/20/24 69.7 kg (153 lb 11.2 oz)   12/26/23 65.8 kg (145 lb)   09/21/23 65.8 kg (145 lb)   08/21/23 64.3 kg (141 lb 12.8 oz)   07/17/23 64 kg (141 lb)   06/15/23 64 kg (141 lb)   06/06/23 64 kg (141 lb 1.6 oz)   05/12/23 63.5 kg (140 lb)   05/05/23 67.1 kg (147 lb 14.9 oz)     NUTRITION DIAGNOSIS/PROBLEM:   Inadequate protein energy intake related to Decreased ability to consume sufficient energy  as evidenced by loss of appetite     NUTRITION INTERVENTION:     NUTRITION PRESCRIPTION:   Estimated Nutrition needs: --dosing wt of 69.1 kg - wt taken on 2/15 (dry weight likely slightly masked by fluid)  Calories: 8125-2952 calories/day (20-25 calories per kg Dosing wt)  Protein: 69-82 g protein/day (1-1.2 g protein/kg Dosing wt)    - Diet:       Procedures    Cardiac diet Sodium Restriction: 2 GM NA; Fluid Restriction: 2000 ml; Is Patient on Accuchecks? No; Low Potassium      - RD Malnutrition Care Plan: Liberalized diet, Encouraged increased PO intake, and Initiated ONS (oral nutritional supplements)  - Meals and snacks: Liberalized diet  - Medical Food Supplements-RD added Ensure Compact (220 calories/ 9 g protein each) BID Vanilla or Chocolate. Rational/use of oral supplements discussed.  - Vitamin and mineral supplements: folic acid and iron  - Feeding assistance: meal set up and assist as needed.  - Nutrition education: Discussed importance of adequate energy and protein intake     - Coordination of nutrition care: collaboration with other providers  - Discharge and transfer of nutrition care to new setting or provider: monitor plans    MONITOR AND EVALUATE/NUTRITION  GOALS:  - Food and Nutrient Intake:      Monitor: adequacy of PO intake and adequacy of supplement intake  - Food and Nutrient Administration:      Monitor: N/A  - Anthropometric Measurement:    Monitor weight  - Nutrition Goals:      maintain wt within 5%, PO and supplement greater than 75% of needs, good supplement intake, labs within acceptable limits, support body systems, and monitor fluid status    DIETITIAN FOLLOW UP: RD to follow and monitor nutrition status       Sydney Edwards RD, LDN  Clinical Dietitian  P: [phone]

## 2024-02-15 NOTE — CM/SW NOTE
Pt transferred from ICU/PCCU this afternoon.    SW sent updates to [facility] via [facility].    Per chart, pt's insurance auth  . She will need new auth prior to DC to [facility].    Per chart review, plan is currently [facility].    PLAN: [facility] w/ [facility] - pending updated PT/OT, ins auth & med clear      SW/CM to remain available for support and/or discharge planning.         Juliana Rodrigez, MSW, LSW [id number]

## 2024-02-15 NOTE — PROGRESS NOTES
[facility]    Progress Note    Margaret Silverio Patient Status:  Inpatient    3/14/1945 MRN [id number]   Location [facility] 2W/SW Attending Raiza Alvarez MD   Hosp Day # 11 PCP Johnathon Rodriguez, DO       Subjective:   Margaret Silverio is a(n) 78 year old female     ROS:     Constitutional: Feels a little stronger  ENMT:  Negative for ear drainage, hearing loss and nasal drainage  Eyes:  Negative for eye discharge and vision loss  Cardiovascular:  Negative for chest pain, sob, irregular heartbeat/palpitations  Respiratory: Shortness of breath with exertion  Gastrointestinal:  Negative for abdominal pain, constipation, decreased appetite, diarrhea and vomiting  Genitourinary:  Negative for dysuria and hematuria  Endocrine:  Negative for abnormal sleep patterns, increased activity, polydipsia and polyphagia  Hema/Lymph:  Negative for easy bleeding and easy bruising  Integumentary:  Negative for pruritus and rash  Musculoskeletal:  Negative for bone/joint symptoms  Neurological:  Negative for gait disturbance  Psychiatric:  Negative for inappropriate interaction and psychiatric symptoms      Vitals:    24 1700   BP: 138/78   Pulse: 66   Resp: 14   Temp:            PHYSICAL EXAM:   Constitutional: appears well hydrated alert and responsive no acute distress noted  Head/Face: normocephalic  Eyes/Vision: normal extraocular motion is intact  Nose/Mouth/Throat:mucous membranes are moist and no oral lesions are noted  Neck/Thyroid: neck is supple without adenopathy  Lymphatic: no abnormal cervical, supraclavicular adenopathy is noted  Respiratory:  lungs are clear to auscultation bilaterally, normal respiratory effort  Cardiovascular: regular rate and rhythm no murmurs, gallups, or rubs  Abdomen: soft, non-tender, non-distended, BS normal  Vascular: well perfused femoral, and pedal pulses normal  Skin/Hair: no unusual rashes present, no abnormal bruising noted  Back/Spine: no  abnormalities noted  Musculoskeletal: full ROM all extremities good strength  no deformities  Extremities: 1+ edema  Neurological:  Grossly normal    Results:     Laboratory Data:  Lab Results   Component Value Date    WBC 4.4 02/14/2024    HGB 8.9 (L) 02/14/2024    HCT 26.9 (L) 02/14/2024    PLT 89.0 (L) 02/14/2024    CREATSERUM 1.52 (H) 02/14/2024    BUN 31 (H) 02/14/2024     02/14/2024    K 3.7 02/14/2024     02/14/2024    CO2 27.0 02/14/2024     (H) 02/14/2024    CA 8.7 02/14/2024    ALB 3.0 (L) 02/12/2024    ALKPHO 147 (H) 02/12/2024    BILT 1.9 (H) 02/12/2024    TP 5.8 02/12/2024     (H) 02/12/2024    ALT 29 02/12/2024    PTT 35.2 02/09/2024    INR 1.25 (H) 02/09/2024    T4F 1.2 02/03/2024    TSH 10.681 (H) 02/03/2024    LIP 32 01/13/2024    MG 1.9 02/14/2024    PHOS 2.7 02/09/2024    B12 >2,000 (H) 02/03/2024       Imaging:  [unfilled]   XR CHEST AP PORTABLE  (CPT=71045)    Result Date: 2/14/2024  CONCLUSION:  1. Cardiomegaly.  Prominent central vasculature. 2. Tortuous thoracic aorta.  Cardiac valve prosthesis. 3. Bilateral perihilar and lower lobe parenchymal opacification most pronounced at the left base with suspected bilateral effusions.  Partial clearing right basilar pleural parenchymal abnormality    Dictated by (CST): Justin Stark MD on 2/14/2024 at 8:08 AM     Finalized by (CST): Justin Stark MD on 2/14/2024 at 8:11 AM          XR CHEST AP PORTABLE  (CPT=71045)    Result Date: 2/13/2024  CONCLUSION:  1. Essentially unchanged chest with redemonstration of moderate cardiomegaly and mild pulmonary congestive changes about the same.  Persistent bibasilar opacification suggesting probable pleural effusions, but I cannot exclude underlying bibasilar pneumonia and or atelectasis.  Findings are about the same.    Dictated by (CST): Josue Hogan MD on 2/13/2024 at 9:44 AM     Finalized by (CST): Josue Hogan MD on 2/13/2024 at 9:47 AM              ASSESSMENT/PLAN:   Assessment       #1 cardiogenic shock slowly getting better  Recheck LFTs  #2 heart failure severely reduced ejection fraction remains on inotropes Bumex switched to twice a day rather than IV drip  Weight is up breathing okay on room air  #3 anemia   Hemoglobin better at 8.9 after transfusion yesterday    #4 renal insufficiency  Creatinine continues to improve 1.5 potassium 3.7 will replace    Spoke to nurse patient's family    2/14/2024  Ayush Key MD

## 2024-02-15 NOTE — PROGRESS NOTES
Double RN skin check done prior to transfer off Unit. Skin check performed by this RN and JEFFERY Webster.     Wounds are as follows: Sacral and L elbow skin tear    Will remain available for any further questions or concerns.

## 2024-02-15 NOTE — OCCUPATIONAL THERAPY NOTE
OCCUPATIONAL THERAPY TREATMENT NOTE - INPATIENT        Room Number: 309/309-A     Presenting Problem: acute on chronic CHF    Problem List  Principal Problem:    Acute on chronic congestive heart failure, unspecified heart failure type (HCC)  Active Problems:    Pancytopenia (HCC)    Goals of care, counseling/discussion    Advance care planning    Palliative care by specialist    Acute deep vein thrombosis (DVT) of left upper extremity (HCC)    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HCC)    Cardiogenic shock (HCC)    HFrEF (heart failure with reduced ejection fraction) ([facility])    Anemia      OCCUPATIONAL THERAPY ASSESSMENT   Patient demonstrates fair progress this session, goals remain in progress.    Patient continues to function below baseline with toileting, bathing, upper body dressing, lower body dressing, grooming, eating, transfers, stating sitting balance, dynamic sitting balance, and static standing balance.   Contributing factors to remaining limitations include decreased functional strength, decreased functional reach, decreased endurance, impaired sitting and standing balance, and decreased muscular endurance.  Next session anticipate patient to progress lower body dressing, grooming, bed mobility, and transfers.  Occupational Therapy will continue to follow patient for duration of hospitalization.    Patient continues to benefit from continued skilled OT services: to promote return to prior level of function and safety with continuous assistance and gradual rehabilitative therapy .     PLAN  OT Treatment Plan: Balance activities;Energy conservation/work simplification techniques;ADL training;Functional transfer training;UE strengthening/ROM;Endurance training;Patient/Family education;Patient/Family training;Equipment eval/education;Compensatory technique education  OT Device Recommendations: Shower chair; Raised toilet seat (built up utensils)    SUBJECTIVE  \"I was just thinking about wanting to get out of bed\"      OBJECTIVE  Precautions: Bed/chair alarm    WEIGHT BEARING RESTRICTION     PAIN ASSESSMENT  Ratin  Location: L UE  Management Techniques: Activity promotion; Repositioning; Nurse notified    ACTIVITY TOLERANCE  Pulse: 63 (66 w/ activity)        BP: 143/78 (supine; 134/91 seated eob -- mild dizziness , cues for visual stabilization)[id number]             O2 SATURATIONS  Oxygen Therapy  SPO2% on Room Air at Rest: 95  SPO2% Ambulation on Room Air: 95    ACTIVITIES OF DAILY LIVING ASSESSMENT  AM-PAC ‘6-Clicks’ Inpatient Daily Activity Short Form  How much help from another person does the patient currently need…  -   Putting on and taking off regular lower body clothing?: Total  -   Bathing (including washing, rinsing, drying)?: A Lot  -   Toileting, which includes using toilet, bedpan or urinal? : Total  -   Putting on and taking off regular upper body clothing?: A Lot  -   Taking care of personal grooming such as brushing teeth?: A Lot  -   Eating meals?: A Lot    AM-PAC Score:  Score: 10  Approx Degree of Impairment: 74.7%  Standardized Score (AM-PAC Scale): 27.31  CMS Modifier (G-Code): CL    FUNCTIONAL TRANSFER ASSESSMENT  Sit to Stand: Edge of Bed  Edge of Bed: Moderate Assist (x2; SPT to bedside chair --simulated commode t/f)    BED MOBILITY  Rolling: Maximum Assist  Supine to Sit : Maximum Assist    BALANCE ASSESSMENT  Static Sitting: Contact Guard Assist    FUNCTIONAL ADL ASSESSMENT  Eating: Not Tested  Grooming Seated: Minimal Assist (sitting balance)  Bathing Seated: Maximum Assist  UB Dressing Seated: Maximum Assist  LB Dressing Seated: Dependent  Toileting Seated: Dependent    Skilled Therapy Provided:   RN cleared pt for participation in occupational therapy session, which was completed in collaboration with physical therapy. Upon arrival, pt was supine in bed and agreeable to activity. No family was present during session. Pt benefited from additional time, verbal cues to maximize participation.    Pt  with good progress towards IP OT goals. Pt with increased alertness this session, followed all cues. Pt completed supine>sit with Mod a x 2, cues for sequencing and reaching for bed rail. Pt maintained sitting while managing light hair grooming and seated therex between Min-Mod A (posterior lean with dual task). With hands on lap, pt maintained static sitting with close CGA. Pt demonstrated simulated commode t/f with Mod A x 2, continued cues for transitional movements. Pt benefited from Max A for management of dynamic ADLs -- LE dressing and toileting.     EDUCATION PROVIDED  Patient: Plan of Care; Role of Occupational Therapy; Discharge Recommendations; Functional Transfer Techniques; Fall Prevention; Posture/Positioning; Proper Body Mechanics  Patient's Response to Education: Verbalized Understanding; Returned Demonstration; Requires Further Education      Patient End of Session: Up in chair;Needs met;Call light within reach;RN aware of session/findings    OT Goals:  Patient's self stated goal is: did not state       Patient will complete functional transfer with min a   Comment: ongoing    Patient will complete toileting with mod a   Comment: ongoing    Patient will tolerate standing for 1-2 minutes in prep for adls with min a   Comment: ongoing            Goals  on:   Frequency: 3x week    OT Session Time: 25 minutes  Self-Care Home Management: 10 minutes  Therapeutic Activity: 15 minutes

## 2024-02-15 NOTE — PHYSICAL THERAPY NOTE
PHYSICAL THERAPY TREATMENT NOTE - INPATIENT     Room Number: 230/230-A       Presenting Problem: acute on chronic CHF, weakness and shortness of breath. Fell out of bed.  Co-Morbidities : pacemaker; RA    Problem List  Principal Problem:    Acute on chronic congestive heart failure, unspecified heart failure type (HCC)  Active Problems:    Pancytopenia ([facility])    Goals of care, counseling/discussion    Advance care planning    Palliative care by specialist    Acute deep vein thrombosis (DVT) of left upper extremity (HCC)    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HCC)    Cardiogenic shock ([facility])    HFrEF (heart failure with reduced ejection fraction) ([facility])    Anemia      PHYSICAL THERAPY ASSESSMENT   Patient demonstrates fair progress this session, goals  remain in progress.    Patient continues to function below baseline with bed mobility, transfers, gait, and standing prolonged periods.  Contributing factors to remaining limitations include decreased functional strength, decreased endurance/aerobic capacity, impaired sitting and standing balance, and decreased muscular endurance.  Next session anticipate patient to progress bed mobility, transfers, and gait.  Physical Therapy will continue to follow patient for duration of hospitalization.    Patient continues to benefit from continued skilled PT services: to promote return to prior level of function and safety with continuous assistance and gradual rehabilitative therapy .    PLAN  PT Treatment Plan: Bed mobility;Body mechanics;Coordination;Endurance;Patient education;Gait training;Strengthening;Transfer training;Balance training  Frequency (Obs): 3-5x/week    SUBJECTIVE  \"I'll be grateful for anything.\"    OBJECTIVE  Precautions: Bed/chair alarm    PAIN ASSESSMENT   Ratin  Location: L UE  Management Techniques: Activity promotion;Body mechanics;Relaxation;Repositioning    BALANCE  Static Sitting: Fair -  Dynamic Sitting: Poor +  Static Standing: Poor  Dynamic Standing: Poor  -    ACTIVITY TOLERANCE  Pulse: 63  Heart Rate Source: Monitor     BP: 143/78 (134/91 mmHg sitting EOB - mild dizziness reported; cues provided for gaze stabilization)  BP Location: Right arm  BP Method: Automatic  Patient Position: Lying     O2 WALK  Oxygen Therapy  SPO2% on Room Air at Rest: 95  SPO2% Ambulation on Room Air: 95    AM-PAC '6-Clicks' INPATIENT SHORT FORM - BASIC MOBILITY  How much difficulty does the patient currently have...  Patient Difficulty: Turning over in bed (including adjusting bedclothes, sheets and blankets)?: A Lot   Patient Difficulty: Sitting down on and standing up from a chair with arms (e.g., wheelchair, bedside commode, etc.): A Lot   Patient Difficulty: Moving from lying on back to sitting on the side of the bed?: A Lot   How much help from another person does the patient currently need...   Help from Another: Moving to and from a bed to a chair (including a wheelchair)?: A Lot   Help from Another: Need to walk in hospital room?: A Lot   Help from Another: Climbing 3-5 steps with a railing?: Total     AM-PAC Score:  Raw Score: 11   Approx Degree of Impairment: 72.57%   Standardized Score (AM-PAC Scale): 33.86   CMS Modifier (G-Code): CL    FUNCTIONAL ABILITY STATUS  Functional Mobility/Gait Assessment  Gait Assistance: Not tested  Distance (ft): SPT  Assistive Device: None (holding onto therapist)  Pattern: Shuffle  Supine to Sit: maximum assist. Patient tolerated static sitting EOB for approx 10 minutes with intermittent BUE support and Min A>CGA in order to maintain static sitting balance. While attempting to perform dynamic BLE therex, patient required Min>Mod A in order to maintain dynamic sitting balance.  SPT bed>chair: moderate assist x 2 with gait belt    Additional information: Patient with increased alertness this session compared to last PT session. Patient able to follow all commands with increased time and cues. Patient motivated to progress mobility and transfer to  bedside chair this session.     The patient's Approx Degree of Impairment: 72.57% has been calculated based on documentation in the [facility] '6 clicks' Inpatient Daily Activity Short Form.  Research supports that patients with this level of impairment may benefit from rehab.  Final disposition will be made by interdisciplinary medical team.    THERAPEUTIC EXERCISES  Lower Extremity Ankle pumps  LAQ     Position Sitting       Patient in bed upon arrival. RN approved activity. Educated patient on POC and benefits of mobilization. Agreeable to participate. Patient reporting no pain.   Patient End of Session: Up in chair;Needs met;Call light within reach;RN aware of session/findings;All patient questions and concerns addressed    CURRENT GOALS   Goals to be met by: 2/17/24  Patient Goal Patient's self-stated goal is: return to PLOF   Goal #1 Patient is able to demonstrate supine - sit EOB @ level: CGA      Goal #1   Current Status Max A   Goal #2 Patient is able to demonstrate transfers EOB to/from Chair/Wheelchair at assistance level: minimum assistance with walker - rolling      Goal #2  Current Status  Mod A x 2 SPT bed>chair   Goal #3 Patient is able to ambulate 25 feet with assist device: walker - rolling at assistance level: minimum assistance   Goal #3   Current Status  NT   Goal #4 Patient to demonstrate independence with home activity/exercise instructions provided to patient in preparation for discharge.   Goal #4   Current Status  Ongoing     Therapeutic Activity: 25 minutes

## 2024-02-15 NOTE — PROGRESS NOTES
[facility]    Progress Note      Assessment and Plan:   1.  Acute on chronic respiratory failure-the patient had cardiogenic shock and for period of time was on both milrinone and dobutamine.  Yesterday, the Bumex drip and dobutamine were discontinued.  The patient is still on the milrinone.  Antibiotics were stopped yesterday as well.  She continues to do well.    Recommendations:  1.  Milrinone management as per cardiology  2.  PAP therapy as needed  3.  Morning chest x-ray, CBC and electrolytes  4.  Will follow clinically.    2.  Thrombocytopenia-resolved.    3.  Cardiomyopathy-as per cardiology    4.  DVT prophylaxis-SCDs temporarily in patient with thrombocytopenia.    5.  Anemia-hemoglobin is now 10.9.  Will follow.    Subjective:   Margaret Silverio is a(n) 78 year old female who is breathing more comfortably and is off PAP therapy    Objective:   Blood pressure (!) 159/93, pulse 62, temperature 97.8 °F (36.6 °C), resp. rate 17, weight 152 lb 5.4 oz (69.1 kg), SpO2 95%.    Physical Exam easily arousable black female  HEENT examination is unremarkable with pupils equal round and reactive to light and accommodation.   Neck without adenopathy, thyromegaly, JVD nor bruit.   Lungs diminished with basilar crackles to auscultation and percussion.  Cardiac regular rate and rhythm 1 out of 6 systolic ejection murmur.   Abdomen nontender, without hepatosplenomegaly and no mass appreciable.   Extremities without clubbing cyanosis nor edema.   Neurologic grossly intact with symmetric tone and strength and reflex.  Skin without gross abnormality     Results:     Lab Results   Component Value Date    WBC 4.0 02/15/2024    HGB 10.9 02/15/2024    HCT 33.2 02/15/2024    .0 02/15/2024    CREATSERUM 1.41 02/15/2024    BUN 29 02/15/2024     02/15/2024    K 3.8 02/15/2024    K 3.8 02/15/2024     02/15/2024    CO2 26.0 02/15/2024     02/15/2024    CA 8.8 02/15/2024    MG 1.8 02/15/2024      Chest x-ray-partial clearing of the right basilar haziness    Aroldo Pacheco MD  Medical Director, Critical Care, [facility]  Medical Director, [facility]  Pager: [phone]

## 2024-02-16 ENCOUNTER — APPOINTMENT (OUTPATIENT)
Dept: GENERAL RADIOLOGY | Facility: HOSPITAL | Age: 79
End: 2024-02-16
Attending: INTERNAL MEDICINE
Payer: MEDICARE

## 2024-02-16 LAB
ANION GAP SERPL CALC-SCNC: 3 MMOL/L (ref 0–18)
BASOPHILS # BLD: 0.04 X10(3) UL (ref 0–0.2)
BASOPHILS NFR BLD: 1 %
BUN BLD-MCNC: 28 MG/DL (ref 9–23)
BUN/CREAT SERPL: 22 (ref 10–20)
CALCIUM BLD-MCNC: 8.9 MG/DL (ref 8.7–10.4)
CHLORIDE SERPL-SCNC: 104 MMOL/L (ref 98–112)
CO2 SERPL-SCNC: 29 MMOL/L (ref 21–32)
CREAT BLD-MCNC: 1.27 MG/DL
DEPRECATED RDW RBC AUTO: 60.3 FL (ref 35.1–46.3)
EGFRCR SERPLBLD CKD-EPI 2021: 44 ML/MIN/1.73M2 (ref 60–?)
EOSINOPHIL # BLD: 0.14 X10(3) UL (ref 0–0.7)
EOSINOPHIL NFR BLD: 4 %
ERYTHROCYTE [DISTWIDTH] IN BLOOD BY AUTOMATED COUNT: 21.5 % (ref 11–15)
GLUCOSE BLD-MCNC: 125 MG/DL (ref 70–99)
HCT VFR BLD AUTO: 39.4 %
HGB BLD-MCNC: 13.2 G/DL
LYMPHOCYTES NFR BLD: 0.42 X10(3) UL (ref 1–4)
LYMPHOCYTES NFR BLD: 12 %
MAGNESIUM SERPL-MCNC: 1.7 MG/DL (ref 1.6–2.6)
MCH RBC QN AUTO: 30.1 PG (ref 26–34)
MCHC RBC AUTO-ENTMCNC: 33.5 G/DL (ref 31–37)
MCV RBC AUTO: 89.7 FL
METAMYELOCYTES # BLD: 0.04 X10(3) UL
METAMYELOCYTES NFR BLD: 1 %
MONOCYTES # BLD: 0.63 X10(3) UL (ref 0.1–1)
MONOCYTES NFR BLD: 18 %
MYELOCYTES # BLD: 0.07 X10(3) UL
MYELOCYTES NFR BLD: 2 %
NEUTROPHILS # BLD AUTO: 1.12 X10 (3) UL (ref 1.5–7.7)
NEUTROPHILS NFR BLD: 62 %
NEUTS HYPERSEG # BLD: 2.17 X10(3) UL (ref 1.5–7.7)
OSMOLALITY SERPL CALC.SUM OF ELEC: 289 MOSM/KG (ref 275–295)
PLATELET # BLD AUTO: 253 10(3)UL (ref 150–450)
PLATELET MORPHOLOGY: NORMAL
POTASSIUM SERPL-SCNC: 4 MMOL/L (ref 3.5–5.1)
POTASSIUM SERPL-SCNC: 4 MMOL/L (ref 3.5–5.1)
RBC # BLD AUTO: 4.39 X10(6)UL
SODIUM SERPL-SCNC: 136 MMOL/L (ref 136–145)
TOTAL CELLS COUNTED BLD: 100
WBC # BLD AUTO: 3.5 X10(3) UL (ref 4–11)

## 2024-02-16 PROCEDURE — 99233 SBSQ HOSP IP/OBS HIGH 50: CPT | Performed by: HOSPITALIST

## 2024-02-16 PROCEDURE — 71045 X-RAY EXAM CHEST 1 VIEW: CPT | Performed by: INTERNAL MEDICINE

## 2024-02-16 PROCEDURE — 99232 SBSQ HOSP IP/OBS MODERATE 35: CPT | Performed by: INTERNAL MEDICINE

## 2024-02-16 PROCEDURE — 99231 SBSQ HOSP IP/OBS SF/LOW 25: CPT | Performed by: REGISTERED NURSE

## 2024-02-16 RX ORDER — HYDROCODONE BITARTRATE AND ACETAMINOPHEN 10; 325 MG/1; MG/1
1 TABLET ORAL EVERY 6 HOURS PRN
Qty: 60 TABLET | Refills: 0 | Status: SHIPPED | OUTPATIENT
Start: 2024-02-16 | End: 2024-02-19

## 2024-02-16 RX ORDER — MAGNESIUM OXIDE 400 MG/1
400 TABLET ORAL ONCE
Status: COMPLETED | OUTPATIENT
Start: 2024-02-16 | End: 2024-02-16

## 2024-02-16 RX ORDER — SENNA AND DOCUSATE SODIUM 50; 8.6 MG/1; MG/1
2 TABLET, FILM COATED ORAL DAILY
Qty: 60 TABLET | Refills: 0 | Status: SHIPPED | OUTPATIENT
Start: 2024-02-17

## 2024-02-16 RX ORDER — SPIRONOLACTONE 25 MG/1
25 TABLET ORAL DAILY
Status: DISCONTINUED | OUTPATIENT
Start: 2024-02-16 | End: 2024-02-18

## 2024-02-16 NOTE — PLAN OF CARE
Pt A/Ox4, two person max assist. IV Bumex and Milrinone continued. Wounds changed by RN, Labs drawn by RN. Plan for [facility] CÉSAR with community palliative care once medically cleared.     Problem: Patient Centered Care  Goal: Patient preferences are identified and integrated in the patient's plan of care  Description: Interventions:  - What would you like us to know as we care for you? I live with my  and two grandchildren  - Provide timely, complete, and accurate information to patient/family  - Incorporate patient and family knowledge, values, beliefs, and cultural backgrounds into the planning and delivery of care  - Encourage patient/family to participate in care and decision-making at the level they choose  - Honor patient and family perspectives and choices  Outcome: Progressing     Problem: Patient/Family Goals  Goal: Patient/Family Long Term Goal  Description: Patient's Long Term Goal: To get better    Interventions:  - IV diuretics, prn O2 supplementation, cardiac monitoring and electrolyte protocol  - See additional Care Plan goals for specific interventions  Outcome: Progressing  Goal: Patient/Family Short Term Goal  Description: Patient's Short Term Goal: To breathe better    Interventions:   - IV diuretics, O2 supplementation prn, cardiac monitoring, electrolyte protocol  - See additional Care Plan goals for specific interventions  Outcome: Progressing     Problem: CARDIOVASCULAR - ADULT  Goal: Maintains optimal cardiac output and hemodynamic stability  Description: INTERVENTIONS:  - Monitor vital signs, rhythm, and trends  - Monitor for bleeding, hypotension and signs of decreased cardiac output  - Evaluate effectiveness of vasoactive medications to optimize hemodynamic stability  - Monitor arterial and/or venous puncture sites for bleeding and/or hematoma  - Assess quality of pulses, skin color and temperature  - Assess for signs of decreased coronary artery perfusion - ex. Angina  -  Evaluate fluid balance, assess for edema, trend weights  Outcome: Progressing  Goal: Absence of cardiac arrhythmias or at baseline  Description: INTERVENTIONS:  - Continuous cardiac monitoring, monitor vital signs, obtain 12 lead EKG if indicated  - Evaluate effectiveness of antiarrhythmic and heart rate control medications as ordered  - Initiate emergency measures for life threatening arrhythmias  - Monitor electrolytes and administer replacement therapy as ordered  Outcome: Progressing     Problem: RESPIRATORY - ADULT  Goal: Achieves optimal ventilation and oxygenation  Description: INTERVENTIONS:  - Assess for changes in respiratory status  - Assess for changes in mentation and behavior  - Position to facilitate oxygenation and minimize respiratory effort  - Oxygen supplementation based on oxygen saturation or ABGs  - Provide Smoking Cessation handout, if applicable  - Encourage broncho-pulmonary hygiene including cough, deep breathe, Incentive Spirometry  - Assess the need for suctioning and perform as needed  - Assess and instruct to report SOB or any respiratory difficulty  - Respiratory Therapy support as indicated  - Manage/alleviate anxiety  - Monitor for signs/symptoms of CO2 retention  Outcome: Progressing     Problem: GENITOURINARY - ADULT  Goal: Absence of urinary retention  Description: INTERVENTIONS:  - Assess patient’s ability to void and empty bladder  - Monitor intake/output and perform bladder scan as needed  - Follow urinary retention protocol/standard of care  - Consider collaborating with pharmacy to review patient's medication profile  - Implement strategies to promote bladder emptying  Outcome: Progressing

## 2024-02-16 NOTE — CM/SW NOTE
Submitted clinical via [email or web address] portal.  [facility] Case/Auth ID is [id number].  Final insurance authorization is pending at this time.      SW/CM assigned to the case will continue to follow auth status.        Nataliya Luna, DSC

## 2024-02-16 NOTE — PAYOR COMM NOTE
--------------  CONTINUED STAY REVIEW    Payor: [employer]  Subscriber #:  [id number]  Authorization Number: [id number]    Admit date: 2/3/24  Admit time: 12:22 AM    Admitting Physician: Rosalinda Barry DO  Attending Physician:  Raiza Alvarez MD  Primary Care Physician: Johnathon Rodriguez DO    REVIEW DOCUMENTATION:     2-15-24      Assessment:   PER CARDIOLOGY       1.  Cardiogenic shock.  Blood pressure and renal function/urine output improved on inotropes.  Good distal perfusion.  Overall is improved.  Stable blood pressure dobutamine has been weaned off now on milrinone we will cut dose by half to see if she is able to tolerate.     2.  Severe left ventricular dysfunction.  As above.  Continue Entresto as well as afterload reduction with hydralazine     3.  Hypertension.     Plan:  Increase Isordil to 10 mg 3 times daily.  Continue all other medications at current doses.  Okay to transfer to telemetry  Continue Bumex 1 mg IV twice a day for another 24 hours.        L3, will follow    Telemetry V paced.    Physical Exam:    General: Alert and oriented x 3,  No apparent distress.  HEENT: No focal deficits.  Neck: + JVD, carotids 2+ no bruits.  Cardiac: Regular rate and rhythm, S1, S2 normal, 6 HSM  Lungs: Clear without wheezes, rales, rhonchi.  Normal excursions and effort.  Abdomen: Soft, non-tender.   Extremities: Without clubbing, cyanosis or edema.  Peripheral pulses are 2+.  Skin: Warm and dry.      Labs:        Lab Results   Component Value Date     WBC 4.0 02/15/2024     HGB 10.9 02/15/2024     HCT 33.2 02/15/2024     .0 02/15/2024            Lab Results   Component Value Date     INR 1.25 (H) 02/09/2024            Lab Results   Component Value Date      02/15/2024     K 3.8 02/15/2024     K 3.8 02/15/2024      02/15/2024     CO2 26.0 02/15/2024     BUN 29 02/15/2024     CREATSERUM 1.41 02/15/2024      02/15/2024     MG 1.8 02/15/2024     CA 8.8 02/15/2024         Assessment and Plan:   1.  Acute on chronic respiratory failure-the patient had cardiogenic shock and for period of time was on both milrinone and dobutamine.  Yesterday, the Bumex drip and dobutamine were discontinued.  The patient is still on the milrinone.  Antibiotics were stopped yesterday as well.  She continues to do well.     Recommendations:  1.  Milrinone management as per cardiology  2.  PAP therapy as needed  3.  Morning chest x-ray, CBC and electrolytes  4.  Will follow clinically.     2.  Thrombocytopenia-resolved.     3.  Cardiomyopathy-as per cardiology     4.  DVT prophylaxis-SCDs temporarily in patient with thrombocytopenia.     5.  Anemia-hemoglobin is now 10.9.  Will follow.       PER PULM/CRIT CRE    Subjective:   Margaret Silverio is a(n) 78 year old female who is breathing more comfortably and is off PAP therapy     Objective:   Blood pressure (!) 159/93, pulse 62, temperature 97.8 °F (36.6 °C), resp. rate 17, weight 152 lb 5.4 oz (69.1 kg), SpO2 95%.     Physical Exam easily arousable black female  HEENT examination is unremarkable with pupils equal round and reactive to light and accommodation.   Neck without adenopathy, thyromegaly, JVD nor bruit.   Lungs diminished with basilar crackles to auscultation and percussion.  Cardiac regular rate and rhythm 1 out of 6 systolic ejection murmur.   Abdomen nontender, without hepatosplenomegaly and no mass appreciable.   Extremities without clubbing cyanosis nor edema.   Neurologic grossly intact with symmetric tone and strength and reflex.  Skin without gross abnormality          MEDICATIONS ADMINISTERED IN LAST 1 DAY:  amiodarone (Pacerone) tab 200 mg       Date Action Dose Route User    2/15/2024 0912 Given 200 mg Oral Trina Lisa, JEFFERY          amitriptyline (Elavil) tab 25 mg       Date Action Dose Route User    2/15/2024 2034 Given 25 mg Oral Elijah Wall, RN          bumetanide (Bumex) 0.25 MG/ML injection 1 mg       Date Action  Dose Route User    2/15/2024 1733 Given 1 mg Intravenous Mary Porter RN    2/15/2024 0912 Given 1 mg Intravenous Trina Lisa RN          ferrous sulfate DR tab 325 mg       Date Action Dose Route User    2/15/2024 0912 Given 325 mg Oral Trina Lisa RN          folic acid (Folvite) tab 800 mcg       Date Action Dose Route User    2/15/2024 0911 Given 800 mcg Oral Trina Lisa RN          hydrALAZINE (Apresoline) tab 25 mg       Date Action Dose Route User    2/16/2024 0509 Given 25 mg Oral Elijah Wall RN    2/15/2024 2034 Given 25 mg Oral Elijah Wall RN    2/15/2024 1346 Given 25 mg Oral Trina Lisa RN          HYDROcodone-acetaminophen (Norco)  MG per tab 1 tablet       Date Action Dose Route User    2/15/2024 2034 Given 1 tablet Oral Elijah Wall RN    2/15/2024 0911 Given 1 tablet Oral Trina Lisa RN          isosorbide dinitrate (Isordil) tab 10 mg       Date Action Dose Route User    2/15/2024 1732 Given 10 mg Oral Mary Porter RN    2/15/2024 1346 Given 10 mg Oral Trina Lisa RN          isosorbide dinitrate (Isordil Titradose) tab 5 mg       Date Action Dose Route User    2/15/2024 0911 Given 5 mg Oral Trina Lisa RN          magnesium oxide (Mag-Ox) tab 400 mg       Date Action Dose Route User    2/15/2024 0911 Given 400 mg Oral Trina Lisa RN          milrinone in dextrose 5% (Primacor) 20 mg/100mL infusion premix       Date Action Dose Route User    2/16/2024 0509 New Bag 0.25 mcg/kg/min × 67.1 kg Intravenous Elijah Wall RN    2/15/2024 1018 New Bag 0.25 mcg/kg/min × 67.1 kg Intravenous Trina Lisa RN          pantoprazole (Protonix) DR tab 40 mg       Date Action Dose Route User    2/16/2024 0522 Given 40 mg Oral Elijah Wall RN    2/15/2024 1732 Given 40 mg Oral Mary Porter, JEFFERY          potassium chloride (K-Dur) tab 40 mEq       Date Action Dose Route User    2/15/2024 0912 Given 40 mEq Oral Trina Lisa,  RN          sacubitril-valsartan (Entresto) 49-51 MG per tab 1 tablet       Date Action Dose Route User    2/15/2024 2034 Given 1 tablet Oral Elijah Wall RN    2/15/2024 0911 Given 1 tablet Oral Trina Lisa RN          senna-docusate (Senokot-S) 8.6-50 MG per tab 2 tablet       Date Action Dose Route User    2/15/2024 0911 Given 2 tablet Oral Trina Lisa RN            Vitals (last day)       Date/Time Temp Pulse Resp BP SpO2 Weight O2 Device O2 Flow Rate (L/min) [facility]    02/16/24 0506 97.6 °F (36.4 °C) 66 18 127/80 97 % -- None (Room air) --     02/16/24 0506 -- -- -- -- -- 151 lb 4.8 oz -- --     02/15/24 2027 97.6 °F (36.4 °C) 65 18 140/86 96 % -- None (Room air) --     02/15/24 2000 -- 66 -- -- -- -- -- --     02/15/24 1732 -- 64 -- 140/80 -- -- -- --     02/15/24 1549 98.4 °F (36.9 °C) 67 20 138/73 100 % -- None (Room air) --     02/15/24 1530 -- 63 -- 143/78 -- -- -- --     02/15/24 1500 -- 63 14 132/72 93 % -- None (Room air) --     02/15/24 1422 -- 63 -- 143/78 -- -- -- --     02/15/24 0600 -- -- -- -- -- 152 lb 5.4 oz -- --     02/15/24 0500 -- 62 17 159/93 95 % -- None (Room air) --     02/15/24 0400 97.8 °F (36.6 °C) 64 20 153/92 96 % -- None (Room air) --     02/15/24 0300 -- 63 20 144/82 98 % -- None (Room air) --     02/15/24 0200 -- 62 13 145/81 98 % -- None (Room air) --     02/15/24 0100 -- 65 17 86/70 95 % -- None (Room air) --     02/15/24 0000 98.3 °F (36.8 °C) 67 12 146/89 99 % -- None (Room air) --             Blood Transfusion Record       Product Unit Status Volume Start End            Transfuse RBC     0397    [id number]  [id number] Completed 02/13/24 1657 275 mL 02/13/24 1539 02/13/24 1645                [facility] platelets     [facility]  [id number]  [id number] Completed 02/11/24 1246 543.83 mL 02/11/24 0640 02/11/24 0745     [place] [address]  [id number]  [id number] Completed 02/09/24 2059 375 mL 02/09/24 1942 02/09/24 2057     [place] [address]  [id number]  [id number]  Completed 02/09/24 2348 265 mL 02/09/24 1837 02/09/24 1930       24  [id number]  [id number] Completed 02/09/24 0026 256.67 mL 02/08/24 2302 02/09/24 0025       24  [id number]  [id number] Completed 02/08/24 2256 230 mL 02/08/24 2140 02/08/24 2255

## 2024-02-16 NOTE — PROGRESS NOTES
[facility]    Progress Note    Margaret Silverio Patient Status:  Inpatient    3/14/1945 MRN [id number]   Location [facility] 3W/SW Attending Raiza Alvarez MD   Hosp Day # 12 PCP Johnathon Rodriguez, DO       Subjective:   Margaret Silverio is a(n) 78 year old female     ROS:     Constitutional: States she feels okay  ENMT:  Negative for ear drainage, hearing loss and nasal drainage  Eyes:  Negative for eye discharge and vision loss  Cardiovascular:  Negative for chest pain, sob, irregular heartbeat/palpitations  Respiratory:  Negative for cough, dyspnea and wheezing  Gastrointestinal: Appetite still diminished  Genitourinary:  Negative for dysuria and hematuria  Endocrine:  Negative for abnormal sleep patterns, increased activity, polydipsia and polyphagia  Hema/Lymph:  Negative for easy bleeding and easy bruising  Integumentary:  Negative for pruritus and rash  Musculoskeletal:  Negative for bone/joint symptoms  Neurological:  Negative for gait disturbance  Psychiatric:  Negative for inappropriate interaction and psychiatric symptoms      Vitals:    02/15/24 1732   BP: 140/80   Pulse: 64   Resp:    Temp:            PHYSICAL EXAM:   Constitutional: appears well hydrated alert and responsive no acute distress noted  Head/Face: normocephalic  Eyes/Vision: normal extraocular motion is intact  Nose/Mouth/Throat:mucous membranes are moist and no oral lesions are noted  Neck/Thyroid: neck is supple without adenopathy  Lymphatic: no abnormal cervical, supraclavicular adenopathy is noted  Resp decreased of breath sounds  Cardiovascular: regular rate and rhythm no murmurs, gallups, or rubs  Abdomen: soft, non-tender, non-distended, BS normal  Vascular: well perfused femoral, and pedal pulses normal  Skin/Hair: no unusual rashes present, no abnormal bruising noted  Back/Spine: no abnormalities noted  Musculoskeletal: full ROM all extremities good strength  no deformities  Extremities: 2+  edema  Neurological:  Grossly normal    Results:     Laboratory Data:  Lab Results   Component Value Date    WBC 4.0 02/15/2024    HGB 10.9 (L) 02/15/2024    HCT 33.2 (L) 02/15/2024    .0 02/15/2024    CREATSERUM 1.41 (H) 02/15/2024    BUN 29 (H) 02/15/2024     02/15/2024    K 3.8 02/15/2024    K 3.8 02/15/2024     02/15/2024    CO2 26.0 02/15/2024     (H) 02/15/2024    CA 8.8 02/15/2024    ALB 3.0 (L) 02/12/2024    ALKPHO 147 (H) 02/12/2024    BILT 1.9 (H) 02/12/2024    TP 5.8 02/12/2024     (H) 02/12/2024    ALT 29 02/12/2024    PTT 35.2 02/09/2024    INR 1.25 (H) 02/09/2024    T4F 1.2 02/03/2024    TSH 10.681 (H) 02/03/2024    LIP 32 01/13/2024    MG 1.8 02/15/2024    PHOS 2.7 02/09/2024    B12 >2,000 (H) 02/03/2024       Imaging:  [unfilled]   XR CHEST AP PORTABLE  (CPT=71045)    Result Date: 2/14/2024  CONCLUSION:  1. Cardiomegaly.  Prominent central vasculature. 2. Tortuous thoracic aorta.  Cardiac valve prosthesis. 3. Bilateral perihilar and lower lobe parenchymal opacification most pronounced at the left base with suspected bilateral effusions.  Partial clearing right basilar pleural parenchymal abnormality    Dictated by (CST): Justin Stark MD on 2/14/2024 at 8:08 AM     Finalized by (CST): Justin Stark MD on 2/14/2024 at 8:11 AM             ASSESSMENT/PLAN:   Assessment       #1 severe heart failure reduced ejection fraction about 10% off dobutamine and IV Bumex now on oral Bumex 1 mg twice a day IV  Urine output good 2200 cc yesterday     #2 GEORGE creatinine down to 1.41 doing well potassium 3.8  3 anemia hemoglobin stable 10.9  #4 cardiogenic shock resolved  #5 anemia stable hemoglobin 10.9  #6 A-fib chronic no anticoagulation  #7 low platelets up to 191 today    Will sign off please call for any questions thank you  2/15/2024  Ayush Key MD

## 2024-02-16 NOTE — CM/SW NOTE
09:35AM  Per RN rounds, unclear if pt will be cleared over the weekend. Pt remains on milrinone drip. Per cards note yesterday, wean in half to see how pt tolerates. Pending update today.    BRADEN requested DSC Nataliya re-submit insurance auth request for [facility] CÉSAR. Previous insurance auth has .    Updated PT/OT notes sent to [facility] via [employer] this AM.    BRADEN spoke to [place] w/ [facility] (max assist/unable to sit w/out assist) set on WILL CALL through . PCS completed and will need date added day of actual DC.    11:50AM  Received Vmail from pt's dtr Barbi - inquiring if pt's insurance approved CÉSAR.    SW called pt's dtr and informed her auth approved and  . Informed her that new auth initiated this AM. Confirmed response may be received later today or tomorrow - all pending review by pt's insurance company. BRADEN confirmed SW/CM to notify Barbi when there is a determination.    [facility] Case/Auth ID is [id number]     SW reviewed [employer] web portal. Pt's insurance approval received again for 2024 through 2024. This approval has next review date of 2024.    Pt's dtr called again and provided new update.    Updated approval sent to [facility] via [employer].      PLAN: [facility] [place] w/ [facility], Ambulance on WC, PCS needs date - pending med clear        SW/CM to remain available for support and/or discharge planning.         Juliana Rodrigez, MSW, LSW [id number]

## 2024-02-16 NOTE — PROGRESS NOTES
[facility]  Cardiology Progress Note    Margaret Silverio Patient Status:  Inpatient    3/14/1945 MRN [id number]   Location [facility] 3W/SW Attending Raiza Alvarez MD   Hosp Day # 13 PCP Johnathon Rodriguez, DO     Subjective     Continues to feel well, denies any cardiac complaints.  Mild lower extremity edema, overall improving.    Objective:   Patient Vitals for the past 24 hrs:   BP Temp Temp src Pulse Resp SpO2 Weight   24 1021 137/76 -- -- 71 -- -- --   24 0922 143/84 97.9 °F (36.6 °C) Oral 70 18 97 % --   24 0506 127/80 97.6 °F (36.4 °C) Oral 66 18 97 % 151 lb 4.8 oz (68.6 kg)   02/15/24 2027 140/86 97.6 °F (36.4 °C) Oral 65 18 96 % --   02/15/24 2000 -- -- -- 66 -- -- --   02/15/24 1732 140/80 -- -- 64 -- -- --   02/15/24 1549 138/73 98.4 °F (36.9 °C) Oral 67 20 100 % --   02/15/24 1530 143/78 -- -- 63 -- -- --   02/15/24 1500 132/72 -- -- 63 14 93 % --   02/15/24 1422 143/78 -- -- 63 -- -- --   02/15/24 1400 143/78 -- -- 64 19 97 % --   02/15/24 1300 150/90 -- -- 64 16 95 % --   02/15/24 1200 (!) 146/106 97.9 °F (36.6 °C) Temporal 68 21 93 % --   02/15/24 1100 (!) 145/93 -- -- 65 14 96 % --     Intake/Output:   Last 3 shifts:   Intake/Output                   24 0700 - 02/15/24 0659 02/15/24 0700 - 24 0659 24 0700 - 24 0659       Intake    P.O.  1010  320  --    P.O. 1010 320 --    I.V.  245.5  20  --    I.V. -- 20 --    Volume Bumetanide 6 -- --    Volume (mL) Dobutamine 80 -- --    Volume (mL) Milrinone 119.5 -- --    Volume (mL) (sodium chloride 0.9% infusion) 40 -- --    Total Intake 1255.5 340 --       Output    Urine  2250  2950  --    Output (mL) ([REMOVED] Urethral Catheter) 2250 1200 --    Output (mL) (External Urinary Catheter) -- 1750 --    Stool  --  --  --    Stool Count Calculated for I/O -- 2 x --    Total Output 2250 2950 --       Net I/O     -994.5 -2610 --           Scheduled Meds:    isosorbide dinitrate  10 mg  Oral TID (Nitrates)    sacubitril-valsartan  1 tablet Oral BID    hydrALAZINE  25 mg Oral Q8H BROOKLYNN    bumetanide  1 mg Intravenous BID (Diuretic)    senna-docusate  2 tablet Oral Daily    amiodarone  200 mg Oral Daily    [Held by provider] spironolactone  25 mg Oral Daily    amitriptyline  25 mg Oral Nightly    ferrous sulfate  325 mg Oral Daily with breakfast    folic acid  800 mcg Oral Daily    pantoprazole  40 mg Oral BID AC    alendronate  70 mg Oral Weekly     Continuous Infusions:    DOBUTamine Stopped (02/14/24 1900)    milrinone in dextrose 5% 0.25 mcg/kg/min (02/16/24 0509)     Results:     Lab Results   Component Value Date    WBC 3.5 (L) 02/16/2024    HGB 13.2 02/16/2024    HCT 39.4 02/16/2024    .0 02/16/2024    CREATSERUM 1.27 (H) 02/16/2024    BUN 28 (H) 02/16/2024     02/16/2024    K 4.0 02/16/2024    K 4.0 02/16/2024     02/16/2024    CO2 29.0 02/16/2024     (H) 02/16/2024    CA 8.9 02/16/2024    ALB 3.0 (L) 02/12/2024    ALKPHO 147 (H) 02/12/2024    BILT 1.9 (H) 02/12/2024    TP 5.8 02/12/2024     (H) 02/12/2024    ALT 29 02/12/2024    PTT 35.2 02/09/2024    INR 1.25 (H) 02/09/2024    T4F 1.2 02/03/2024    TSH 10.681 (H) 02/03/2024    LIP 32 01/13/2024    MG 1.7 02/16/2024    PHOS 2.7 02/09/2024    B12 >2,000 (H) 02/03/2024       Recent Labs   Lab 02/14/24  0524 02/14/24  1610 02/15/24  0440 02/16/24  0505   *  --  102* 125*   BUN 31*  --  29* 28*   CREATSERUM 1.52*  --  1.41* 1.27*   CA 8.7  --  8.8 8.9     --  136 136   K 3.4* 3.7 3.8  3.8 4.0  4.0     --  103 104   CO2 27.0  --  26.0 29.0     Recent Labs   Lab 02/14/24  0524 02/15/24  0440 02/16/24  0505   RBC 2.98* 3.68* 4.39   HGB 8.9* 10.9* 13.2   HCT 26.9* 33.2* 39.4   MCV 90.3 90.2 89.7   MCH 29.9 29.6 30.1   MCHC 33.1 32.8 33.5   RDW 20.7* 21.4* 21.5*   NEPRELIM 3.58 2.47 1.12*   WBC 4.4 4.0 3.5*   PLT 89.0* 191.0 253.0       No results for input(s): \"BNPML\" in the last 168 hours.    No  results for input(s): \"TROP\", \"CK\" in the last 168 hours.    XR CHEST AP PORTABLE  (CPT=71045)    Result Date: 2024  CONCLUSION:  1. Cardiomegaly.  Prominent central vasculature. 2. Prosthetic heart valve.  ICD transvenous leads unchanged. 3. Bilateral perihilar lower lobe mixed alveolar and interstitial airspace disease with bibasilar effusions.  Progressive worsening right lung base.    Dictated by (CST): Justin Stark MD on 2024 at 10:10 AM     Finalized by (CST): Justin Stark MD on 2024 at 10:12 AM          XR CHEST AP PORTABLE  (CPT=71045)    Result Date: 2024  CONCLUSION:  1. Cardiomegaly.  Prominent central vasculature. 2. Tortuous thoracic aorta.  Cardiac valve prosthesis. 3. Bilateral perihilar and lower lobe parenchymal opacification most pronounced at the left base with suspected bilateral effusions.  Partial clearing right basilar pleural parenchymal abnormality    Dictated by (CST): Justin Stark MD on 2024 at 8:08 AM     Finalized by (CST): Justin Stark MD on 2024 at 8:11 AM             CARD ECHO 2D DOPPLER (CPT=93306)    Result Date: 2/10/2024  Transthoracic Echocardiogram Name:Margaret Silverio Date: 02/10/2024 :  1945 Ht:  (63in)  BP: 115 / 76 MRN:  [id number]    Age:  78years    Wt:  (147lb) HR: 75bpm Loc:  [facility]       Gndr: F          BSA: 1.7m^2 Sonographer: Priscilla Joseph Ordering:    Macy Schuster Consulting:  Aroldo Pacheco ---------------------------------------------------------------------------- History/Indications:  Lactic acidosis. ---------------------------------------------------------------------------- Procedure information:  A transthoracic complete 2D study was performed. Additional evaluation included M-mode, complete spectral Doppler, and color Doppler.  Patient status:  Inpatient.  Location:  Bedside.    Comparison was made to the study of 2024.    This was a STAT study.  Image quality was adequate.  Intravenous contrast (Definity) was administered to evaluate left ventricular function. ---------------------------------------------------------------------------- Conclusions: 1. Left ventricle: The cavity size was normal. Wall thickness was normal.    Systolic function was normal. The estimated ejection fraction was 15-20%,    by biplane method of disks. Doppler parameters are consistent with an    irreversible restrictive pattern, indicative of decreased left    ventricular diastolic compliance and/or increased left atrial pressure -    grade 4 diastolic dysfunction. 2. Right ventricle: The cavity size was markedly increased. Pacer wire noted    in the right ventricle. Although right ventricular systolic pressure was    not increased relative to right atrial pressure, the right atrial    pressure would appear to be markedly elevated as a result of wide-open    tricuspid regurgitation. Absolute RV systolic pressure was, therefore, at    least moderately increased. 3. Ventricular septum: Septal motion was dyssynergic. 4. Left atrium: The atrium was increased in size. Echodensity seen in LA. 5. Right atrium: The atrium was markedly dilated. Pacer wire noted in right    atrium. 6. Mitral valve: A bioprosthesis is present and functioning normally. 7. Tricuspid valve: There was malcoaptation of the valve leaflets. There was    wide-open regurgitation. * ---------------------------------------------------------------------------- * Findings: Left ventricle:  The cavity size was normal. Wall thickness was normal. Systolic function was normal. The estimated ejection fraction was 15-20%, by biplane method of disks. No diagnostic evidence for diffuse regional wall motion abnormalities. Doppler parameters are consistent with an irreversible restrictive pattern, indicative of decreased left ventricular diastolic compliance and/or increased left atrial pressure - grade 4 diastolic dysfunction. Ventricular septum:   Septal  motion was dyssynergic. Left atrium:  The atrium was increased in size. Echodensity seen in LA. Right ventricle:  The cavity size was markedly increased. Pacer wire noted in the right ventricle. Systolic function was normal.  Although right ventricular systolic pressure was not increased relative to right atrial pressure, the right atrial pressure would appear to be markedly elevated as a result of wide-open tricuspid regurgitation. Absolute RV systolic pressure was, therefore, at least moderately increased. Right atrium:  The atrium was markedly dilated. Pacer wire noted in right atrium. Mitral valve:  A bioprosthesis is present and functioning normally. Leaflet separation was normal.  Doppler:  Transvalvular velocity was within the normal range. There was no evidence for stenosis. There was no significant regurgitation. Aortic valve:  The valve was structurally normal. The valve was trileaflet. Cusp separation was normal.  Doppler:  Transvalvular velocity was within the normal range. There was no evidence for stenosis. There was no significant regurgitation. Tricuspid valve:  The valve is structurally normal. Leaflet separation was normal. There was malcoaptation of the valve leaflets.  Doppler: Transvalvular velocity was within the normal range. There was no evidence for stenosis. There was wide-open regurgitation. Pulmonic valve:   The valve is structurally normal. Cusp separation was normal.  Doppler:  Transvalvular velocity was within the normal range. There was no evidence for stenosis. There was mild regurgitation. Pericardium:   There was no pericardial effusion. Aorta: Aortic root: The aortic root was normal-sized. Ascending aorta: The ascending aorta was normal. Systemic veins:  Central venous respirophasic diameter changes are blunted (< 50%). Inferior vena cava: The IVC was dilated. ---------------------------------------------------------------------------- Measurements  Systemic veins      Value         Ref    01/14/2024  Estimated CVP       15    mm Hg  ------ 8  Right ventricle     Value        Ref    01/14/2024  TAPSE, 2D       (L) 0.97  cm     >=1.70 1.58  RV s', lateral  (L) 7.6   cm/sec >=9.5  7.6 Legend: (L)  and  (H)  kulwant values outside specified reference range. ---------------------------------------------------------------------------- Prepared and electronically signed by Twin Boles 02/10/2024 17:27      Exam:     General: Alert and oriented x 3. No apparent distress.   HEENT: Normocephalic, anicteric sclera, neck supple, no thyromegaly or adenopathy.  Neck: No JVD, carotids 2+, no bruits.  Cardiac: Regular rate and rhythm. S1, S2 normal. No murmur, pericardial rub, S3, or extra cardiac sounds.  Lungs: Clear without wheezes, rales, rhonchi or dullness.  Normal excursions and effort.  Abdomen: Soft, non-tender. No organosplenomegally, mass or rebound, BS-present.  Extremities: Without clubbing or cyanosis. 1+ LE edema.    Neurologic: Alert and oriented, normal affect. No focal defects  Skin: Warm and dry.     Assessment and Plan:   Assessment   Acute on chronic decompensated systolic and diastolic heart failure, cardiogenic shock  Cardiomyopathy, EF 15%  Severe tricuspid valve regurgitation  History of bioprosthetic mitral valve replacement  Lactic acidosis secondary to cardiogenic etiology  Left atrial mass  Paroxysmal atrial fibrillation/flutter  History of ICD, dual-chamber pacing  Thrombocytopenia, immune related  Anemia, h/o GIB  Question of infection/sepsis     Plan  - Milrinone weaned yesterday, will discontinue altogether today  - Continue IV Bumex 1 mg twice daily, volume status and creatinine both improving daily  - Continue Isordil/hydralazine 3 times daily, Entresto twice daily  - Restart home spironolactone 25 mg daily  - May consider adding low-dose carvedilol tomorrow    Ayush Spangler MD  [facility]  2/16/2024

## 2024-02-16 NOTE — PLAN OF CARE
Patient transferred from PCCU.  Hooks catheter removed. Milrinone gtt continued. IV diuretics continued. Family at bedside updated on plan of care.     Problem: Patient Centered Care  Goal: Patient preferences are identified and integrated in the patient's plan of care  Description: Interventions:  - What would you like us to know as we care for you? I live with my  and two grandchildren  - Provide timely, complete, and accurate information to patient/family  - Incorporate patient and family knowledge, values, beliefs, and cultural backgrounds into the planning and delivery of care  - Encourage patient/family to participate in care and decision-making at the level they choose  - Honor patient and family perspectives and choices  Outcome: Progressing     Problem: Patient/Family Goals  Goal: Patient/Family Long Term Goal  Description: Patient's Long Term Goal: To get better    Interventions:  - IV diuretics, prn O2 supplementation, cardiac monitoring and electrolyte protocol  - See additional Care Plan goals for specific interventions  Outcome: Progressing  Goal: Patient/Family Short Term Goal  Description: Patient's Short Term Goal: To breathe better    Interventions:   - IV diuretics, O2 supplementation prn, cardiac monitoring, electrolyte protocol  - See additional Care Plan goals for specific interventions  Outcome: Progressing     Problem: CARDIOVASCULAR - ADULT  Goal: Maintains optimal cardiac output and hemodynamic stability  Description: INTERVENTIONS:  - Monitor vital signs, rhythm, and trends  - Monitor for bleeding, hypotension and signs of decreased cardiac output  - Evaluate effectiveness of vasoactive medications to optimize hemodynamic stability  - Monitor arterial and/or venous puncture sites for bleeding and/or hematoma  - Assess quality of pulses, skin color and temperature  - Assess for signs of decreased coronary artery perfusion - ex. Angina  - Evaluate fluid balance, assess for edema,  trend weights  Outcome: Progressing  Goal: Absence of cardiac arrhythmias or at baseline  Description: INTERVENTIONS:  - Continuous cardiac monitoring, monitor vital signs, obtain 12 lead EKG if indicated  - Evaluate effectiveness of antiarrhythmic and heart rate control medications as ordered  - Initiate emergency measures for life threatening arrhythmias  - Monitor electrolytes and administer replacement therapy as ordered  Outcome: Progressing     Problem: RESPIRATORY - ADULT  Goal: Achieves optimal ventilation and oxygenation  Description: INTERVENTIONS:  - Assess for changes in respiratory status  - Assess for changes in mentation and behavior  - Position to facilitate oxygenation and minimize respiratory effort  - Oxygen supplementation based on oxygen saturation or ABGs  - Provide Smoking Cessation handout, if applicable  - Encourage broncho-pulmonary hygiene including cough, deep breathe, Incentive Spirometry  - Assess the need for suctioning and perform as needed  - Assess and instruct to report SOB or any respiratory difficulty  - Respiratory Therapy support as indicated  - Manage/alleviate anxiety  - Monitor for signs/symptoms of CO2 retention  Outcome: Progressing     Problem: GENITOURINARY - ADULT  Goal: Absence of urinary retention  Description: INTERVENTIONS:  - Assess patient’s ability to void and empty bladder  - Monitor intake/output and perform bladder scan as needed  - Follow urinary retention protocol/standard of care  - Consider collaborating with pharmacy to review patient's medication profile  - Implement strategies to promote bladder emptying  Outcome: Progressing     Problem: Impaired Functional Mobility  Goal: Achieve highest/safest level of mobility/gait  Description: Interventions:  - Assess patient's functional ability and stability  - Promote increasing activity/tolerance for mobility and gait  - Educate and engage patient/family in tolerated activity level and precautions    Outcome:  Progressing     Problem: Patient Centered Care  Goal: Patient preferences are identified and integrated in the patient's plan of care  Description: Interventions:  - What would you like us to know as we care for you? I live with my  and two grandchildren  - Provide timely, complete, and accurate information to patient/family  - Incorporate patient and family knowledge, values, beliefs, and cultural backgrounds into the planning and delivery of care  - Encourage patient/family to participate in care and decision-making at the level they choose  - Honor patient and family perspectives and choices  Outcome: Progressing     Problem: Patient/Family Goals  Goal: Patient/Family Long Term Goal  Description: Patient's Long Term Goal: To get better    Interventions:  - IV diuretics, prn O2 supplementation, cardiac monitoring and electrolyte protocol  - See additional Care Plan goals for specific interventions  Outcome: Progressing  Goal: Patient/Family Short Term Goal  Description: Patient's Short Term Goal: To breathe better    Interventions:   - IV diuretics, O2 supplementation prn, cardiac monitoring, electrolyte protocol  - See additional Care Plan goals for specific interventions  Outcome: Progressing     Problem: CARDIOVASCULAR - ADULT  Goal: Maintains optimal cardiac output and hemodynamic stability  Description: INTERVENTIONS:  - Monitor vital signs, rhythm, and trends  - Monitor for bleeding, hypotension and signs of decreased cardiac output  - Evaluate effectiveness of vasoactive medications to optimize hemodynamic stability  - Monitor arterial and/or venous puncture sites for bleeding and/or hematoma  - Assess quality of pulses, skin color and temperature  - Assess for signs of decreased coronary artery perfusion - ex. Angina  - Evaluate fluid balance, assess for edema, trend weights  Outcome: Progressing  Goal: Absence of cardiac arrhythmias or at baseline  Description: INTERVENTIONS:  - Continuous cardiac  monitoring, monitor vital signs, obtain 12 lead EKG if indicated  - Evaluate effectiveness of antiarrhythmic and heart rate control medications as ordered  - Initiate emergency measures for life threatening arrhythmias  - Monitor electrolytes and administer replacement therapy as ordered  Outcome: Progressing     Problem: RESPIRATORY - ADULT  Goal: Achieves optimal ventilation and oxygenation  Description: INTERVENTIONS:  - Assess for changes in respiratory status  - Assess for changes in mentation and behavior  - Position to facilitate oxygenation and minimize respiratory effort  - Oxygen supplementation based on oxygen saturation or ABGs  - Provide Smoking Cessation handout, if applicable  - Encourage broncho-pulmonary hygiene including cough, deep breathe, Incentive Spirometry  - Assess the need for suctioning and perform as needed  - Assess and instruct to report SOB or any respiratory difficulty  - Respiratory Therapy support as indicated  - Manage/alleviate anxiety  - Monitor for signs/symptoms of CO2 retention  Outcome: Progressing     Problem: GENITOURINARY - ADULT  Goal: Absence of urinary retention  Description: INTERVENTIONS:  - Assess patient’s ability to void and empty bladder  - Monitor intake/output and perform bladder scan as needed  - Follow urinary retention protocol/standard of care  - Consider collaborating with pharmacy to review patient's medication profile  - Implement strategies to promote bladder emptying  Outcome: Progressing     Problem: Impaired Functional Mobility  Goal: Achieve highest/safest level of mobility/gait  Description: Interventions:  - Assess patient's functional ability and stability  - Promote increasing activity/tolerance for mobility and gait  - Educate and engage patient/family in tolerated activity level and precautions    Outcome: Progressing

## 2024-02-16 NOTE — PALLIATIVE CARE NOTE
[facility]  Palliative Care Progress Note    Margaret Silverio Patient Status:  Inpatient    3/14/1945 MRN [id number]   Location [facility] 3W/SW Attending Rosalinda Barry,    Hosp Day # 13 PCP Johnathon Rodriguez,      The  Cures Act makes medical notes like these available to patients in the interest of transparency. Please be advised this is a medical document. Medical documents are intended to carry relevant information, facts as evident, and the clinical opinion of the practitioner. The medical note is intended as peer to peer communication and may appear blunt or direct. It is written in medical language and may contain abbreviations or verbiage that are unfamiliar.       Subjective     Reviewed symptom medications past 24 hrs: Norco 10/325 mg po X2    Patient was seen and examined in bed with her sister and  present and dtr Barbi on speaker phone. Pt is drowsy after having her Norco this morning, but pt is easy to arouse and tells me she was having bad neck pain this morning which has now improved. Pt denies any dyspnea symptoms on RA. Appetite remains poor, denies abdominal pain, n/v and moved her bowels yesterday. Milrinone gtt was stopped this morning.    See summary of discussion below.     Review of Systems:  See above for details    Allergies:  Allergies   Allergen Reactions    Lisinopril Coughing       Medications:     Current Facility-Administered Medications:     spironolactone (Aldactone) tab 25 mg, 25 mg, Oral, Daily    isosorbide dinitrate (Isordil) tab 10 mg, 10 mg, Oral, TID (Nitrates)    sacubitril-valsartan (Entresto) 49-51 MG per tab 1 tablet, 1 tablet, Oral, BID    hydrALAZINE (Apresoline) tab 25 mg, 25 mg, Oral, Q8H BROOKLYNN    bumetanide (Bumex) 0.25 MG/ML injection 1 mg, 1 mg, Intravenous, BID (Diuretic)    senna-docusate (Senokot-S) 8.6-50 MG per tab 2 tablet, 2 tablet, Oral, Daily    DOBUTamine in dextrose 5% (Dobutrex) 500 mg/250mL  infusion premix, 5 mcg/kg/min (Dosing Weight), Intravenous, Continuous    milrinone in dextrose 5% (Primacor) 20 mg/100mL infusion premix, 0.25 mcg/kg/min, Intravenous, Continuous    amiodarone (Pacerone) tab 200 mg, 200 mg, Oral, Daily    polyethylene glycol (PEG 3350) (Miralax) 17 g oral packet 17 g, 17 g, Oral, Daily PRN    magnesium hydroxide (Milk of Magnesia) 400 MG/5ML oral suspension 30 mL, 30 mL, Oral, Daily PRN    bisacodyl (Dulcolax) 10 MG rectal suppository 10 mg, 10 mg, Rectal, Daily PRN    fleet enema (Fleet) 7-19 GM/118ML rectal enema 133 mL, 1 enema, Rectal, Once PRN    amitriptyline (Elavil) tab 25 mg, 25 mg, Oral, Nightly    ferrous sulfate DR tab 325 mg, 325 mg, Oral, Daily with breakfast    folic acid (Folvite) tab 800 mcg, 800 mcg, Oral, Daily    HYDROcodone-acetaminophen (Norco)  MG per tab 1 tablet, 1 tablet, Oral, Q6H PRN    pantoprazole (Protonix) DR tab 40 mg, 40 mg, Oral, BID AC    alendronate (Fosamax) tab 70 mg, 70 mg, Oral, Weekly    Objective     Vital Signs:  Blood pressure 137/76, pulse 71, temperature 97.9 °F (36.6 °C), temperature source Oral, resp. rate 18, weight 151 lb 4.8 oz (68.6 kg), SpO2 97%.  Body mass index is 25.18 kg/m².  Present Level of pain: 0/10  Non-verbal signs of pain present: No    Physical Exam:  General: Alert and in no apparent distress. Weak appearing  HEENT: No focal deficits.  Cardiac: Regular rate and rhythm, S1, S2 normal, no murmur, rub or gallop.  Lungs: Clear without wheezes, rales, rhonchi or dullness.  Normal excursions and effort.  Abdomen: Soft, non-tender, normal bowel sounds X 4 quadrants, no rebound or guarding  Extremities: Without clubbing, cyanosis. Peripheral pulses are 2+. BLE Edema not present, severe hand deformities r/t RA  Neurologic: Alert and oriented X4, follows commands  Skin: Warm and dry.    Prior to admission Palliative performance scale PPSv2 (%): 60    Current PPS 30%    Hematology:  Lab Results   Component Value Date     WBC 3.5 (L) 02/16/2024    HGB 13.2 02/16/2024    HCT 39.4 02/16/2024    .0 02/16/2024       Coags:  Lab Results   Component Value Date    INR 1.25 (H) 02/09/2024    PTT 35.2 02/09/2024       Chemistry:  Lab Results   Component Value Date    CREATSERUM 1.27 (H) 02/16/2024    BUN 28 (H) 02/16/2024     02/16/2024    K 4.0 02/16/2024    K 4.0 02/16/2024     02/16/2024    CO2 29.0 02/16/2024     (H) 02/16/2024    CA 8.9 02/16/2024    ALB 3.0 (L) 02/12/2024    ALKPHO 147 (H) 02/12/2024    BILT 1.9 (H) 02/12/2024    TP 5.8 02/12/2024     (H) 02/12/2024    ALT 29 02/12/2024    MG 1.7 02/16/2024    PHOS 2.7 02/09/2024       Imaging:  XR CHEST AP PORTABLE  (CPT=71045)    Result Date: 2/16/2024  CONCLUSION:  1. Cardiomegaly.  Prominent central vasculature. 2. Prosthetic heart valve.  ICD transvenous leads unchanged. 3. Bilateral perihilar lower lobe mixed alveolar and interstitial airspace disease with bibasilar effusions.  Progressive worsening right lung base.    Dictated by (CST): Justin Stark MD on 2/16/2024 at 10:10 AM     Finalized by (CST): Justin Stark MD on 2/16/2024 at 10:12 AM          XR CHEST AP PORTABLE  (CPT=71045)    Result Date: 2/14/2024  CONCLUSION:  1. Cardiomegaly.  Prominent central vasculature. 2. Tortuous thoracic aorta.  Cardiac valve prosthesis. 3. Bilateral perihilar and lower lobe parenchymal opacification most pronounced at the left base with suspected bilateral effusions.  Partial clearing right basilar pleural parenchymal abnormality    Dictated by (CST): Justin Stark MD on 2/14/2024 at 8:08 AM     Finalized by (CST): Justin Stark MD on 2/14/2024 at 8:11 AM           2/10/24 Echocardiogram  Conclusions:     1. Left ventricle: The cavity size was normal. Wall thickness was normal.      Systolic function was normal. The estimated ejection fraction was 15-20%,      by biplane method of disks. Doppler parameters are consistent with an       irreversible restrictive pattern, indicative of decreased left      ventricular diastolic compliance and/or increased left atrial pressure -      grade 4 diastolic dysfunction.   2. Right ventricle: The cavity size was markedly increased. Pacer wire noted      in the right ventricle. Although right ventricular systolic pressure was      not increased relative to right atrial pressure, the right atrial      pressure would appear to be markedly elevated as a result of wide-open      tricuspid regurgitation. Absolute RV systolic pressure was, therefore, at      least moderately increased.   3. Ventricular septum: Septal motion was dyssynergic.   4. Left atrium: The atrium was increased in size. Echodensity seen in LA.   5. Right atrium: The atrium was markedly dilated. Pacer wire noted in right      atrium.   6. Mitral valve: A bioprosthesis is present and functioning normally.   7. Tricuspid valve: There was malcoaptation of the valve leaflets. There was      wide-open regurgitation   Follow up GOC Discussion      2/16/24-Provided update to pt and her family. They are encouraged her condition has improved and we talked about plan for CÉSAR on dc. Reinforced the benefits of palliative care and she will be followed by [facility] on dc. I discussed she will be at risk for future re-hospitalizations given her advanced CHF. Pt wants to continue with supportive care and would consider re-hospitalization if needed again. Provided emotional support to pt/family who are coping adequately.    2/14/24-Provided clinical update to pt and she is hopeful she will continue to improve with ongoing supportive care. She knows she will need CÉSAR on dc and discussed recommendation for community palliative care program to follow as well. I encouraged her to maintain hope and positive attitude which will help with her recovery.     I also called her dtr Barbi with update and reiterated above. She is interested in knowing  plan from cardiology on inotropic therapy as outpt. She is coping adequately and appreciative of the call.     2/13/24-I spoke with pt about her clinical condition. She tells me she is concerned about her poor health and says she doesn't want to suffer. She tells me yesterday she had many family members come to visit with her and says she is trying to get her affairs in order with help with her dtr Barbi. I readdressed code status again with pt in the setting of end stage CHF. Pt is contemplating making herself DNAR, but wants to talk with her first before making this decision. She will remain full code for now and wants to continue with supportive care.    I also called her dtr Barbi with clinical update. She talked with me about their discussions yesterday and she is coming back today to talk with her mother further. Barbi tells me the MDs told her mother about her poor prognosis yesterday. Barbi doesn't want her mother to suffer if things were to worsen and she will come to talk further with her about code status today. She will call me after their discussion and told her I can come back to meet with them. Provided emotional support to pt/dtr who are coping adequately.     Addendum 2p-I followed back with pt when her dtr Barbi came to visit. Pt had other family also come to visit with her. They did discuss further her code status and pt has expressed wishes for DNAR/DNI-selective and continue supportive care. I met with pt's dtr Barbi in conference room and we talked about her clinical picture. She is not sure pt would want to have long term palliative inotropic therapy and I encouraged further discussion with cardiology about this. POLST form was completed. They also know about option for comfort care with hospice if her condition worsens. She is not ready for this now and wants to continue with supportive care. GOC discussions will be ongoing.     2/12/24-Provided clinical update to pt. Talked with  her about worsening heart failure/cardiogenic shock/GEORGE. We addressed her code status and pt wants to remain full code and continue with supportive care.    Dr. Barry and I also called her dtr Barbi who is POA. We provided clinical overview and deterioration over the weekend. We stressed pt's overall poor prognosis and discussed end stage CHF with her. Discussed pt would not be dialysis candidate if renal function worsens. Barbi understands how ill pt is currently and she will be coming in to see her later today to have more discussions with her mother. Encouraged setting limits towards DNAR. Will continue supportive care and pt/family will need ongoing discussions through clinical course.     2/9/24-I met with pt and her family including dtr Barbi and  Low. I provided clinical overview. I also reinforced benefits of palliative care. I discussed the normal disease trajectory of CHF, concerns with ongoing pancytopenia/thrombocytopenia, DVT off A/C d/t thrombocytopenia, recent GIB, AFib with recommendation for w/u for watchman as outpt. She understands she will be at risk for recurrent hospitalizations and would be ok with being re-hospitalized again. Pt wants to continue with supportive care and we discussed plan for CÉSAR on dc. I recommended having a community palliative care program follow on dc which they seem open to. Discussed ongoing GOC discussions will be needed over time.     I also readdressed the importance of ACP prior to crisis. Pt was agreeable to talking about advance directives today. I reviewed HCPOA paperwork with pt in detail. She expressed wanting her dtr Barbi to be to be her primary HCPOA and  as secondary POA. HCPOA paperwork completed with pt. I also addressed her full code status and discussed the risks vs benefits of life sustaining treatments in the setting of her clinical picture and encouraged setting limits. Pt expressed wanting to remain full code. She tells us  she would not want prolonged heroic measures taken or if she was in vegetative state, but would want resuscitation attempted. I provided blank POLST form for their reference for the future.     Provided emotional support to pt/family who are coping adequately.       Discussed with Patient and Family: Yes  Patient's preference about sharing medical information: speak to pt and family  Patient's decision making preferences: speak to Pt  Code status: DNAR/DNI-selective  Have advanced directives been discussed with patient or healthcare power of : Yes        Healthcare Agent Appointed: Yes  Healthcare Agent's Name: Barbi Silverio  Healthcare Agent's Phone Number: [phone]          Spiritual needs addressed: Patient/family declined Spiritual Care    Palliative disposition: Community Palliative Care    Procedures:   No intubation    Palliative Care Assessment and Recommendations     Problem List:     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  -Continue Miralax prn and ducolax supp prn  -Education provided on need for bowel regimen while taking opioids.      Goals of care counseling  -see above for details  -Confirmed wishes for DNAR/DNI-selective and continue supportive care.  -Pt/dtr Barbi are aware of long term poor prognosis and she is not candidate for dialysis if renal function worsens in the future.  -Ongoing GOC discussions will be needed over time.  -Agreeable to palliative care following  -Dispo:  Plan for CÉSAR Peggy Peace with [facility] care program to follow  on dc. SW to help with dc planning.   -Provided emotional support to pt/family who are coping adequately.     Advance care planning  -Pt's dtr Barbi Silverio is primary HCPOA #[phone].  -Previously completed POLST/ HCPOA paperwork in EPIC.     Palliative Performance Scale 30%     Emotion support provided to patient/family today: Yes     A total of 25 mins were spent on this consult, which included all of the following: direct face to face contact, history taking, physical examination, and >50% was spent counseling and coordinating care.    Discussed today's visit with Dr. Alvarez and Chioma RN    I sign off as GOC are established and dc is anticipated soon.    Aparna Mahan, ANP-BC, ACHPN [id number]  2/16/2024  12:01 PM  Palliative Care Services

## 2024-02-16 NOTE — PROGRESS NOTES
[facility]    Progress Note    Margaret Silverio Patient Status:  Inpatient    3/14/1945 MRN [id number]   Location [facility] 3W/SW Attending Rosalinda Barry DO   Hosp Day # 13 PCP Johnathon Rodriguez DO     Chief complaint chf     Subjective:   Margaret Silverio is resting in bed. Off milrinone drip. No chest pain. Still getting IV bumex pushes.       Objective:   Blood pressure 138/63, pulse 68, temperature 97.9 °F (36.6 °C), temperature source Oral, resp. rate 18, weight 151 lb 4.8 oz (68.6 kg), SpO2 96%.      Intake/Output Summary (Last 24 hours) at 2024 1509  Last data filed at 2024 0916  Gross per 24 hour   Intake 20 ml   Output 2250 ml   Net -2230 ml       Patient Weight(s) for the past 336 hrs:   Weight   24 0506 151 lb 4.8 oz (68.6 kg)   02/15/24 0600 152 lb 5.4 oz (69.1 kg)   24 0559 156 lb 1.4 oz (70.8 kg)   24 0600 158 lb 11.7 oz (72 kg)   24 0800 149 lb 14.6 oz (68 kg)   24 0000 148 lb 13 oz (67.5 kg)   24 0800 147 lb 11.3 oz (67 kg)   24 0503 147 lb 14.4 oz (67.1 kg)   24 0434 149 lb (67.6 kg)   24 0457 149 lb 8 oz (67.8 kg)   24 1108 150 lb 14.4 oz (68.4 kg)   24 0400 149 lb 6.4 oz (67.8 kg)   24 0635 148 lb 14.4 oz (67.5 kg)   24 0453 149 lb 9.6 oz (67.9 kg)   24 0032 149 lb 14.4 oz (68 kg)   24 135 lb (61.2 kg)           General appearance: Alert and oriented x person and place   Pulmonary:  CTA , decreased at bases   Cardiovascular: S1, S2 normal, no murmur, click, rub or gallop, regular rate and rhythm  Abdominal: soft, non-tender; bowel sounds normal; no masses,  no organomegaly  Extremities: extremities normal, atraumatic, no cyanosis or edema        Medicines:           Lab Results   Component Value Date    WBC 3.5 (L) 2024    HGB 13.2 2024    HCT 39.4 2024    .0 2024    CREATSERUM 1.27 (H) 2024    BUN 28 (H) 2024      02/16/2024    K 4.0 02/16/2024    K 4.0 02/16/2024     02/16/2024    CO2 29.0 02/16/2024     (H) 02/16/2024    CA 8.9 02/16/2024    ALB 3.0 (L) 02/12/2024    ALKPHO 147 (H) 02/12/2024    BILT 1.9 (H) 02/12/2024    TP 5.8 02/12/2024     (H) 02/12/2024    ALT 29 02/12/2024    PTT 35.2 02/09/2024    INR 1.25 (H) 02/09/2024    T4F 1.2 02/03/2024    TSH 10.681 (H) 02/03/2024    LIP 32 01/13/2024    MG 1.7 02/16/2024    PHOS 2.7 02/09/2024    B12 >2,000 (H) 02/03/2024       XR CHEST AP PORTABLE  (CPT=71045)    Result Date: 2/16/2024  CONCLUSION:  1. Cardiomegaly.  Prominent central vasculature. 2. Prosthetic heart valve.  ICD transvenous leads unchanged. 3. Bilateral perihilar lower lobe mixed alveolar and interstitial airspace disease with bibasilar effusions.  Progressive worsening right lung base.    Dictated by (CST): Justin Stark MD on 2/16/2024 at 10:10 AM     Finalized by (CST): Justin Stark MD on 2/16/2024 at 10:12 AM             Results:     CBC:    Lab Results   Component Value Date    WBC 3.5 (L) 02/16/2024    WBC 4.0 02/15/2024    WBC 4.4 02/14/2024     Lab Results   Component Value Date    HGB 13.2 02/16/2024    HGB 10.9 (L) 02/15/2024    HGB 8.9 (L) 02/14/2024      Lab Results   Component Value Date    .0 02/16/2024    .0 02/15/2024    PLT 89.0 (L) 02/14/2024       Recent Labs   Lab 02/14/24  0524 02/14/24  1610 02/15/24  0440 02/16/24  0505   *  --  102* 125*   BUN 31*  --  29* 28*   CREATSERUM 1.52*  --  1.41* 1.27*   CA 8.7  --  8.8 8.9     --  136 136   K 3.4* 3.7 3.8  3.8 4.0  4.0     --  103 104   CO2 27.0  --  26.0 29.0        spironolactone  25 mg Oral Daily    isosorbide dinitrate  10 mg Oral TID (Nitrates)    sacubitril-valsartan  1 tablet Oral BID    hydrALAZINE  25 mg Oral Q8H BROOKLYNN    bumetanide  1 mg Intravenous BID (Diuretic)    senna-docusate  2 tablet Oral Daily    amiodarone  200 mg Oral Daily    amitriptyline  25 mg  Oral Nightly    ferrous sulfate  325 mg Oral Daily with breakfast    folic acid  800 mcg Oral Daily    pantoprazole  40 mg Oral BID AC    alendronate  70 mg Oral Weekly           Assessment and Plan:           Assessment & Plan:     Acute respiratory failure 2/2 cardiogenic shock   Repeat CXR with CHF.   On IV bumex BID- bumex drip discontinued   Apprec pulm and cards consult   Repeat echo with EF 15%, severe TR  Blood cxs sent - ntd. Antibiotics discontinued   Dobutamine discontinued  Milrinone weaned off today   Strict I/o and daily wts   Monitor creatinine closely which is rising. Renal consulted - apprec input   PAP therapy prn  Change IV bumex to PO tomorrow if ok with Cards   Continue on Entresto           2. Pancytopenia/ worsening severe acute TCP:  -had similar issue on last admission Dr Snyder was consulted; thought to be consumptive in seeing of acute illness vs drug induced at the time. No h/o heparin use   - apprec Hem input - continue on prednisone   - haptoglobin decreased and ldh elevated   - Had one unit of blood 2/12   - cont decadron for now. Repeat fibrinogen neg   - supportive transfusion for platelets      3. Paroxysmal atrial fibrillation   Hold oral anticoagulation given recent GI bleed  Currently rate controlled  Still considering outpatient Watchman procedure given her recurrent GI bleeds     4. Chronic kidney injury stage III  - creat stable ; 200 out   - strict I/o's   - daily wts       5. Subclavian clot on icd lead   - May restart AC  if platelet count>50,000 and if ok with GI  - platelets are better- not a candidate for AC due to history of GI bleed    6 HTN  -Continue on hydralazine   - Isordil increased to 10 mg PO TID  - Ok to transfer to [facility]        DVT ppx scds      Discussed with pt's daughter Barbi with [facility] Palliative care. She understands her heart fx is much worse. Discussed with Dr Lr.   Patient is DNR/Select.       Raiza Alvarez MD         Chart reviewed,  including current vitals, notes, labs and imaging  Labs ordered and medications adjusted as outlined above  Coordinate care with care team/consultants  Discussed with patient results of tests, management plan as outlined above, and the need for ongoing hospitalization  D/w RN     MALLORY high

## 2024-02-16 NOTE — PROGRESS NOTES
Hematology Oncology Progress Note    Patient Name: Margaret Silverio   YOB: 1945   Medical Record Number: [id number]   CSN: [id number]   Attending Physician: Briana Snyder MD     Subjective:  Feels better overall, but still weak.   Pain is well controlled. Appetite is poor.   Breathing comfortable, no chest pain, cough, fever.   No bleeding issues. Leg swelling improving.     Objective:  Vitals:  Vitals:    02/16/24 0506 02/16/24 0922 02/16/24 1021 02/16/24 1314   BP: 127/80 143/84 137/76 138/63   BP Location: Right arm Right arm Right arm Right arm   Pulse: 66 70 71 68   Resp: 18 18  18   Temp: 97.6 °F (36.4 °C) 97.9 °F (36.6 °C)     TempSrc: Oral Oral     SpO2: 97% 97%  96%   Weight: 68.6 kg (151 lb 4.8 oz)          Current Medications:    Current Facility-Administered Medications:     spironolactone (Aldactone) tab 25 mg, 25 mg, Oral, Daily    isosorbide dinitrate (Isordil) tab 10 mg, 10 mg, Oral, TID (Nitrates)    sacubitril-valsartan (Entresto) 49-51 MG per tab 1 tablet, 1 tablet, Oral, BID    hydrALAZINE (Apresoline) tab 25 mg, 25 mg, Oral, Q8H BROOKLYNN    bumetanide (Bumex) 0.25 MG/ML injection 1 mg, 1 mg, Intravenous, BID (Diuretic)    senna-docusate (Senokot-S) 8.6-50 MG per tab 2 tablet, 2 tablet, Oral, Daily    DOBUTamine in dextrose 5% (Dobutrex) 500 mg/250mL infusion premix, 5 mcg/kg/min (Dosing Weight), Intravenous, Continuous    milrinone in dextrose 5% (Primacor) 20 mg/100mL infusion premix, 0.25 mcg/kg/min, Intravenous, Continuous    amiodarone (Pacerone) tab 200 mg, 200 mg, Oral, Daily    polyethylene glycol (PEG 3350) (Miralax) 17 g oral packet 17 g, 17 g, Oral, Daily PRN    magnesium hydroxide (Milk of Magnesia) 400 MG/5ML oral suspension 30 mL, 30 mL, Oral, Daily PRN    bisacodyl (Dulcolax) 10 MG rectal suppository 10 mg, 10 mg, Rectal, Daily PRN    fleet enema (Fleet) 7-19 GM/118ML rectal enema 133 mL, 1 enema, Rectal, Once PRN    amitriptyline (Elavil) tab 25 mg, 25 mg, Oral,  Nightly    ferrous sulfate DR tab 325 mg, 325 mg, Oral, Daily with breakfast    folic acid (Folvite) tab 800 mcg, 800 mcg, Oral, Daily    HYDROcodone-acetaminophen (Norco)  MG per tab 1 tablet, 1 tablet, Oral, Q6H PRN    pantoprazole (Protonix) DR tab 40 mg, 40 mg, Oral, BID AC    alendronate (Fosamax) tab 70 mg, 70 mg, Oral, Weekly    Physical Examination:  General: Lethargic, frail, drowsy, oriented.   Chest: Clear to auscultation.  Heart: Irregular. + ICD in place.   Abdomen: Soft, non tender  Extremities: Pitting edema bilateral LE edema. +LUE edema  Neurological: Grossly intact.     Labs:  Recent Labs   Lab 02/14/24  0524 02/15/24  0440 02/16/24  0505   RBC 2.98* 3.68* 4.39   HGB 8.9* 10.9* 13.2   HCT 26.9* 33.2* 39.4   MCV 90.3 90.2 89.7   MCH 29.9 29.6 30.1   MCHC 33.1 32.8 33.5   RDW 20.7* 21.4* 21.5*   NEPRELIM 3.58 2.47 1.12*   WBC 4.4 4.0 3.5*   PLT 89.0* 191.0 253.0     Recent Labs   Lab 02/10/24  1352 02/11/24  0514 02/12/24  0455 02/13/24  0559 02/14/24  0524 02/14/24  1610 02/15/24  0440 02/16/24  0505   GLU 90 128* 134*   < > 126*  --  102* 125*   BUN 25* 33* 35*   < > 31*  --  29* 28*   CREATSERUM 1.66* 1.68* 1.64*   < > 1.52*  --  1.41* 1.27*   EGFRCR 32* 31* 32*   < > 35*  --  38* 44*   CA 8.7 8.5* 8.7   < > 8.7  --  8.8 8.9   ALB 3.7 3.3 3.0*  --   --   --   --   --     137 140   < > 137  --  136 136   K 5.4* 5.0 4.2   < > 3.4* 3.7 3.8  3.8 4.0  4.0    102 105   < > 103  --  103 104   CO2 16.0* 24.0 26.0   < > 27.0  --  26.0 29.0   ALKPHO 181*  --  147*  --   --   --   --   --    AST 59*  --  118*  --   --   --   --   --    ALT 16  --  29  --   --   --   --   --    BILT 2.3*  --  1.9*  --   --   --   --   --    TP 7.1  --  5.8  --   --   --   --   --     < > = values in this interval not displayed.      Recent Labs   Lab 02/09/24  1458   INR 1.25*   PTT 35.2        Radiology:  XR CHEST AP PORTABLE  (CPT=71045)    Result Date: 2/16/2024  CONCLUSION:  1. Cardiomegaly.  Prominent  central vasculature. 2. Prosthetic heart valve.  ICD transvenous leads unchanged. 3. Bilateral perihilar lower lobe mixed alveolar and interstitial airspace disease with bibasilar effusions.  Progressive worsening right lung base.    Dictated by (CST): Justin Stark MD on 2/16/2024 at 10:10 AM     Finalized by (CST): Justin Stark MD on 2/16/2024 at 10:12 AM            Impression and Plan:  78 year old AAF with acute on chronic CHF, chronic anemia, hematology consulted for worsening pancytopenia:     Thrombocytopenia  - severe acute thrombocytopenia, with normal count at baseline probably consumptive in the setting of acute heart failure/shock vs  immune mediated.   - thrombocytopenia now resolved, plt count back to normal s/p dex 40 mg x4 days and multiple platelet transfusions.     Acute on chronic CHF  - cardiogenic shock secondary to end-stage nonischemic cardiomyopathy, history of mitral valve replacement with bioprosthetic valve paroxysmal atrial fibrillation/flutter.   - management per cardiology  - palliative care consulted for goals of care    Left subclavian DVT  - nonocclusive DVT along the margin of ICD lead in the left subclavian vein  - not a candidate for anticoagulation given high risk for BIG, anemia    Acute on chronic anemia   - chronic multifactorial anemia; anemia of chronic disease, renal failure, drug induced (methotrexate), recent GI blood loss in the setting of anticoagulation  - prior BMBx was negative per patient- done in [place] few years ago. no records available.   - currently no sings or symptoms of active bleeding.  - hgb improving s/p multiple transfusion.      Will respectfully sign off as her blood counts improved and no acute hematologic issues. Please re consult if necessary.   Thank you for the opportunity to participate in the care of Margaret Snyder MD   [facility]

## 2024-02-16 NOTE — PROGRESS NOTES
[facility]    Progress Note    Margaret Silverio Patient Status:  Inpatient    3/14/1945 MRN [id number]   Location [facility] 3W/SW Attending Raiza Alvarez MD   Hosp Day # 13 PCP Johnathon Rodriguez DO       Subjective:   Subjective:  Patient was seen and examined  Feeling better overall  Comfortable on room air  Denies chest pain or shortness of breath or cough  No fever  Objective:   Blood pressure 143/84, pulse 70, temperature 97.9 °F (36.6 °C), temperature source Oral, resp. rate 18, weight 151 lb 4.8 oz (68.6 kg), SpO2 97%.  Physical Exam  Constitutional:       General: She is not in acute distress.  HENT:      Head: Atraumatic.      Nose: Nose normal.   Eyes:      General: No scleral icterus.  Cardiovascular:      Rate and Rhythm: Normal rate.      Heart sounds: Murmur heard.      No gallop.   Pulmonary:      Effort: No respiratory distress.      Breath sounds: No stridor. No wheezing, rhonchi or rales.   Abdominal:      General: Abdomen is flat. Bowel sounds are normal.      Palpations: Abdomen is soft.   Musculoskeletal:      Cervical back: Normal range of motion. No rigidity.      Right lower leg: No edema.      Left lower leg: No edema.   Skin:     General: Skin is dry.   Neurological:      Mental Status: She is oriented to person, place, and time.         Results:   Lab Results   Component Value Date    WBC 3.5 (L) 2024    HGB 13.2 2024    HCT 39.4 2024    .0 2024    CREATSERUM 1.27 (H) 2024    BUN 28 (H) 2024     2024    K 4.0 2024    K 4.0 2024     2024    CO2 29.0 2024     (H) 2024    CA 8.9 2024    ALB 3.0 (L) 2024    ALKPHO 147 (H) 2024    BILT 1.9 (H) 2024    TP 5.8 2024     (H) 2024    ALT 29 2024    PTT 35.2 2024    INR 1.25 (H) 2024    T4F 1.2 2024    TSH 10.681 (H) 2024    LIP 32 2024     MG 1.7 02/16/2024    PHOS 2.7 02/09/2024    TROPHS 53 (HH) 02/10/2024    B12 >2,000 (H) 02/03/2024         Assessment & Plan:     1-cardiogenic shock with underlying end-stage cardiomyopathy  LVEF 15 % with severe RV dysfunction and severe TR  Overall better and off dobutamine and Bumex drip  To stay on milrinone 0  Hemodynamically better pulmonary status better    2-s/p acute respiratory failure secondary to above  Overall better and currently on room air  CXR stable /chronic small right basilar effusion/atelectasis otherwise clear     3-history of GI bleed and AVM and anemia   Stable / no bleed      4-DVT prophylaxis  SCD since platelet very low    5- DNR/select         Linsey Liao MD  2/16/2024

## 2024-02-16 NOTE — OCCUPATIONAL THERAPY NOTE
OCCUPATIONAL THERAPY TREATMENT NOTE - INPATIENT        Room Number: 309/309-A     Presenting Problem: acute on chronic CHF    Problem List  Principal Problem:    Acute on chronic congestive heart failure, unspecified heart failure type (HCC)  Active Problems:    Pancytopenia (HCC)    Goals of care, counseling/discussion    Advance care planning    Palliative care by specialist    Acute deep vein thrombosis (DVT) of left upper extremity (HCC)    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HCC)    Cardiogenic shock (HCC)    HFrEF (heart failure with reduced ejection fraction) ([facility])    Anemia      OCCUPATIONAL THERAPY ASSESSMENT   Patient demonstrates fair progress this session, goals remain in progress.    Patient continues to function below baseline with ADLs, functional transfers, dynamic standing and sitting balance, decreased strength, ROM and activity tolerance.   Contributing factors to remaining limitations include decreased functional strength, decreased endurance, pain, impaired   balance, decreased muscular endurance, and limited   ROM.  Next session anticipate patient to progress bed mobility, transfers, dynamic sitting balance, static standing balance, dynamic standing balance, maintaining seated position, and aerobic capacity.  Occupational Therapy will continue to follow patient for duration of hospitalization.    Patient continues to benefit from continued skilled OT services: to promote return to prior level of function and safety with continuous assistance and gradual rehabilitative therapy .     PLAN  OT Treatment Plan: Balance activities;Energy conservation/work simplification techniques;ADL training;Functional transfer training;UE strengthening/ROM;Endurance training;Patient/Family education;Patient/Family training;Equipment eval/education;Compensatory technique education  OT Device Recommendations: Shower chair; Raised toilet seat (built up utensils)    SUBJECTIVE  \"I can try\"- sitting EOB    OBJECTIVE  Precautions:  Bed/chair alarm    WEIGHT BEARING RESTRICTION     PAIN ASSESSMENT  Rating: Unable to rate  Location: neck pain  Management Techniques: Repositioning; Other (Comment) (towel roll)      ACTIVITIES OF DAILY LIVING ASSESSMENT  AM-PAC ‘6-Clicks’ Inpatient Daily Activity Short Form  How much help from another person does the patient currently need…  -   Putting on and taking off regular lower body clothing?: Total  -   Bathing (including washing, rinsing, drying)?: A Lot  -   Toileting, which includes using toilet, bedpan or urinal? : Total  -   Putting on and taking off regular upper body clothing?: A Lot  -   Taking care of personal grooming such as brushing teeth?: A Lot  -   Eating meals?: A Lot    AM-PAC Score:  Score: 10  Approx Degree of Impairment: 74.7%  Standardized Score (AM-PAC Scale): 27.31  CMS Modifier (G-Code): CL      THERAPEUTIC EXERCISE     Skilled Therapy Provided: Received supine in bed asleep. RN approved session. Pt agreeable to session, assisted with LB dressing and toileting supine with max-total A. Pt transferred to sitting at EOB with max A. Sat at EOB with mod A and intermittent min A for balance during seated exercises. Pt able to sit for ~15 min with min-mod A for balance, cues to enhance balance while seated EOB. Pt participated in BLE exercises 2x10 reps ankle pumps, kicks and 2x5 reps seated marched to enhance LE strength needed in prep for functional transfers. Intermittent seated rest breaks required. Pt assisted back to supine with max A and rolled with max A for pillow placement for pressure redistribution. Performed BUE exercises shoulder flexion x10 reps, elbow flexion x10 reps. Pt left supine with all needs met, family and PCT present.    EDUCATION PROVIDED  Patient: Role of Occupational Therapy; Posture/Positioning; Compensatory ADL Techniques; UE HEP for ROM; UE HEP for Strengthening  Patient's Response to Education: Verbalized Understanding; Returned Demonstration; Requires  Further Education    The patient's Approx Degree of Impairment: 74.7% has been calculated based on documentation in the [facility] '6 clicks' Inpatient Daily Activity Short Form.  Research supports that patients with this level of impairment may benefit from rehab.  Final disposition will be made by interdisciplinary medical team.    Patient End of Session: In bed;Needs met;Call light within reach;RN aware of session/findings;All patient questions and concerns addressed;Alarm set;Family present    OT Goals:     Patient will complete functional transfer with min a   Comment: ongoing    Patient will complete toileting with mod a   Comment: ongoing    Patient will tolerate standing for 1-2 minutes in prep for adls with min a   Comment: ongoing              OT Session Time: 30 minutes    Therapeutic Activity: 15 minutes  Therapeutic Exercise: 15 minutes    MAKSIM Plascencia/KVNG

## 2024-02-17 LAB
ANION GAP SERPL CALC-SCNC: 5 MMOL/L (ref 0–18)
BUN BLD-MCNC: 23 MG/DL (ref 9–23)
BUN/CREAT SERPL: 20.9 (ref 10–20)
CALCIUM BLD-MCNC: 8.9 MG/DL (ref 8.7–10.4)
CHLORIDE SERPL-SCNC: 103 MMOL/L (ref 98–112)
CO2 SERPL-SCNC: 31 MMOL/L (ref 21–32)
CREAT BLD-MCNC: 1.1 MG/DL
DEPRECATED RDW RBC AUTO: 64.3 FL (ref 35.1–46.3)
EGFRCR SERPLBLD CKD-EPI 2021: 52 ML/MIN/1.73M2 (ref 60–?)
ERYTHROCYTE [DISTWIDTH] IN BLOOD BY AUTOMATED COUNT: 21.7 % (ref 11–15)
GLUCOSE BLD-MCNC: 92 MG/DL (ref 70–99)
HCT VFR BLD AUTO: 37.2 %
HGB BLD-MCNC: 12.4 G/DL
MAGNESIUM SERPL-MCNC: 1.7 MG/DL (ref 1.6–2.6)
MCH RBC QN AUTO: 30.5 PG (ref 26–34)
MCHC RBC AUTO-ENTMCNC: 33.3 G/DL (ref 31–37)
MCV RBC AUTO: 91.4 FL
OSMOLALITY SERPL CALC.SUM OF ELEC: 291 MOSM/KG (ref 275–295)
PLATELET # BLD AUTO: 246 10(3)UL (ref 150–450)
POTASSIUM SERPL-SCNC: 3.5 MMOL/L (ref 3.5–5.1)
POTASSIUM SERPL-SCNC: 4.4 MMOL/L (ref 3.5–5.1)
RBC # BLD AUTO: 4.07 X10(6)UL
SODIUM SERPL-SCNC: 139 MMOL/L (ref 136–145)
WBC # BLD AUTO: 4.2 X10(3) UL (ref 4–11)

## 2024-02-17 PROCEDURE — 99232 SBSQ HOSP IP/OBS MODERATE 35: CPT | Performed by: INTERNAL MEDICINE

## 2024-02-17 PROCEDURE — 99233 SBSQ HOSP IP/OBS HIGH 50: CPT | Performed by: HOSPITALIST

## 2024-02-17 RX ORDER — BUMETANIDE 1 MG/1
1 TABLET ORAL
Status: DISCONTINUED | OUTPATIENT
Start: 2024-02-17 | End: 2024-02-18

## 2024-02-17 RX ORDER — MAGNESIUM OXIDE 400 MG/1
400 TABLET ORAL ONCE
Status: COMPLETED | OUTPATIENT
Start: 2024-02-17 | End: 2024-02-17

## 2024-02-17 RX ORDER — POTASSIUM CHLORIDE 20 MEQ/1
40 TABLET, EXTENDED RELEASE ORAL EVERY 4 HOURS
Status: COMPLETED | OUTPATIENT
Start: 2024-02-17 | End: 2024-02-17

## 2024-02-17 RX ORDER — CARVEDILOL 3.12 MG/1
3.12 TABLET ORAL 2 TIMES DAILY WITH MEALS
Status: DISCONTINUED | OUTPATIENT
Start: 2024-02-17 | End: 2024-02-18

## 2024-02-17 NOTE — PLAN OF CARE
Problem: CARDIOVASCULAR - ADULT  Goal: Maintains optimal cardiac output and hemodynamic stability  Description: INTERVENTIONS:  - Monitor vital signs, rhythm, and trends  - Monitor for bleeding, hypotension and signs of decreased cardiac output  - Evaluate effectiveness of vasoactive medications to optimize hemodynamic stability  - Monitor arterial and/or venous puncture sites for bleeding and/or hematoma  - Assess quality of pulses, skin color and temperature  - Assess for signs of decreased coronary artery perfusion - ex. Angina  - Evaluate fluid balance, assess for edema, trend weights  Outcome: Progressing  Goal: Absence of cardiac arrhythmias or at baseline  Description: INTERVENTIONS:  - Continuous cardiac monitoring, monitor vital signs, obtain 12 lead EKG if indicated  - Evaluate effectiveness of antiarrhythmic and heart rate control medications as ordered  - Initiate emergency measures for life threatening arrhythmias  - Monitor electrolytes and administer replacement therapy as ordered  Outcome: Progressing     Problem: RESPIRATORY - ADULT  Goal: Achieves optimal ventilation and oxygenation  Description: INTERVENTIONS:  - Assess for changes in respiratory status  - Assess for changes in mentation and behavior  - Position to facilitate oxygenation and minimize respiratory effort  - Oxygen supplementation based on oxygen saturation or ABGs  - Provide Smoking Cessation handout, if applicable  - Encourage broncho-pulmonary hygiene including cough, deep breathe, Incentive Spirometry  - Assess the need for suctioning and perform as needed  - Assess and instruct to report SOB or any respiratory difficulty  - Respiratory Therapy support as indicated  - Manage/alleviate anxiety  - Monitor for signs/symptoms of CO2 retention  Outcome: Progressing    Received awake,oriented.Slept soundly during the night. No complaints of pain at this time. Possible transfer to [facility] pending insurance  authorization and medical clearance.Sister stayed overnight.

## 2024-02-17 NOTE — PROGRESS NOTES
[facility]    Progress Note    Margaret Silverio Patient Status:  Inpatient    3/14/1945 MRN [id number]   Location [facility] 3W/SW Attending Rosalinda Barry DO   Hosp Day # 14 PCP Johnathon Rodriguez DO     Chief complaint chf     Subjective:   Margaret Silverio is resting in bed. Feeling well. No complaints.       Objective:   Blood pressure 151/63, pulse 67, temperature 98.4 °F (36.9 °C), temperature source Oral, resp. rate 18, weight 142 lb 3.2 oz (64.5 kg), SpO2 96%.      Intake/Output Summary (Last 24 hours) at 2024 1245  Last data filed at 2024 0822  Gross per 24 hour   Intake 400 ml   Output 2950 ml   Net -2550 ml       Patient Weight(s) for the past 336 hrs:   Weight   24 0527 142 lb 3.2 oz (64.5 kg)   24 0506 151 lb 4.8 oz (68.6 kg)   02/15/24 0600 152 lb 5.4 oz (69.1 kg)   24 0559 156 lb 1.4 oz (70.8 kg)   24 0600 158 lb 11.7 oz (72 kg)   24 0800 149 lb 14.6 oz (68 kg)   24 0000 148 lb 13 oz (67.5 kg)   24 0800 147 lb 11.3 oz (67 kg)   24 0503 147 lb 14.4 oz (67.1 kg)   24 0434 149 lb (67.6 kg)   24 0457 149 lb 8 oz (67.8 kg)   24 1108 150 lb 14.4 oz (68.4 kg)   24 0400 149 lb 6.4 oz (67.8 kg)   24 0635 148 lb 14.4 oz (67.5 kg)   24 0453 149 lb 9.6 oz (67.9 kg)           General appearance: Alert and oriented x person and place   Pulmonary:  CTA , decreased at bases   Cardiovascular: S1, S2 normal, no murmur, click, rub or gallop, regular rate and rhythm  Abdominal: soft, non-tender; bowel sounds normal; no masses,  no organomegaly  Extremities: extremities normal, atraumatic, no cyanosis or edema        Medicines:           Lab Results   Component Value Date    WBC 4.2 2024    HGB 12.4 2024    HCT 37.2 2024    .0 2024    CREATSERUM 1.10 (H) 2024    BUN 23 2024     2024    K 3.5 2024     2024    CO2 31.0  02/17/2024    GLU 92 02/17/2024    CA 8.9 02/17/2024    ALB 3.0 (L) 02/12/2024    ALKPHO 147 (H) 02/12/2024    BILT 1.9 (H) 02/12/2024    TP 5.8 02/12/2024     (H) 02/12/2024    ALT 29 02/12/2024    PTT 35.2 02/09/2024    INR 1.25 (H) 02/09/2024    T4F 1.2 02/03/2024    TSH 10.681 (H) 02/03/2024    LIP 32 01/13/2024    MG 1.7 02/17/2024    PHOS 2.7 02/09/2024    B12 >2,000 (H) 02/03/2024       XR CHEST AP PORTABLE  (CPT=71045)    Result Date: 2/16/2024  CONCLUSION:  1. Cardiomegaly.  Prominent central vasculature. 2. Prosthetic heart valve.  ICD transvenous leads unchanged. 3. Bilateral perihilar lower lobe mixed alveolar and interstitial airspace disease with bibasilar effusions.  Progressive worsening right lung base.    Dictated by (CST): Justin Stark MD on 2/16/2024 at 10:10 AM     Finalized by (CST): Justin Stark MD on 2/16/2024 at 10:12 AM             Results:     CBC:    Lab Results   Component Value Date    WBC 4.2 02/17/2024    WBC 3.5 (L) 02/16/2024    WBC 4.0 02/15/2024     Lab Results   Component Value Date    HGB 12.4 02/17/2024    HGB 13.2 02/16/2024    HGB 10.9 (L) 02/15/2024      Lab Results   Component Value Date    .0 02/17/2024    .0 02/16/2024    .0 02/15/2024       Recent Labs   Lab 02/15/24  0440 02/16/24  0505 02/17/24  0543   * 125* 92   BUN 29* 28* 23   CREATSERUM 1.41* 1.27* 1.10*   CA 8.8 8.9 8.9    136 139   K 3.8  3.8 4.0  4.0 3.5    104 103   CO2 26.0 29.0 31.0        carvedilol  3.125 mg Oral BID with meals    potassium chloride  40 mEq Oral Q4H    bumetanide  1 mg Oral BID (Diuretic)    spironolactone  25 mg Oral Daily    isosorbide dinitrate  10 mg Oral TID (Nitrates)    sacubitril-valsartan  1 tablet Oral BID    hydrALAZINE  25 mg Oral Q8H BROOKLYNN    senna-docusate  2 tablet Oral Daily    amiodarone  200 mg Oral Daily    amitriptyline  25 mg Oral Nightly    ferrous sulfate  325 mg Oral Daily with breakfast    folic acid  800  mcg Oral Daily    pantoprazole  40 mg Oral BID AC    alendronate  70 mg Oral Weekly           Assessment and Plan:           Assessment & Plan:     Acute respiratory failure 2/2 cardiogenic shock   Repeat CXR with CHF.   On IV bumex BID- bumex drip discontinued   Apprec pulm and cards consult   Repeat echo with EF 15%, severe TR  Blood cxs sent - ntd. Antibiotics discontinued   Dobutamine discontinued  Milrinone weaned off today   Strict I/o and daily wts   Monitor creatinine closely which is rising. Renal consulted - apprec input   PAP therapy prn  Change IV bumex to PO 1 mg BID  Coreg 3.125 BID   Continue on Entresto  Discharge to [facility]            2. Pancytopenia/ worsening severe acute TCP:  -had similar issue on last admission Dr Snyder was consulted; thought to be consumptive in seeing of acute illness vs drug induced at the time. No h/o heparin use   - apprec Hem input - continue on prednisone   - haptoglobin decreased and ldh elevated   - Had one unit of blood 2/12   - cont decadron for now. Repeat fibrinogen neg   - supportive transfusion for platelets      3. Paroxysmal atrial fibrillation   Hold oral anticoagulation given recent GI bleed  Currently rate controlled  Still considering outpatient Watchman procedure given her recurrent GI bleeds     4. Chronic kidney injury stage III  - creat stable ; 200 out   - strict I/o's   - daily wts       5. Subclavian clot on icd lead   - May restart AC  if platelet count>50,000 and if ok with GI  - platelets are better- not a candidate for AC due to history of GI bleed    6 HTN  -Continue on hydralazine   - Isordil increased to 10 mg PO TID  - Ok to transfer to tele        DVT ppx scds      Discussed with pt's daughter Barbi with [facility] Palliative care. She understands her heart fx is much worse. Discussed with Dr Lr.   Patient is DNR/Select.       Raiza Alvarez MD         Chart reviewed, including current vitals, notes, labs and imaging  Labs ordered  and medications adjusted as outlined above  Coordinate care with care team/consultants  Discussed with patient results of tests, management plan as outlined above, and the need for ongoing hospitalization  D/w RN     MDM high

## 2024-02-17 NOTE — PROGRESS NOTES
[facility] Cardiology Progress Note    Margaret Silverio Patient Status:  Inpatient    3/14/1945 MRN [id number]   Location [facility] 3W/SW Attending Raiza Alvraez MD   Hosp Day # 14 PCP Johnathon Rodriguez,      Subjective:  Denies cp,sob, orthopnea. Family at bedside     Objective:  /79 (BP Location: Radial)   Pulse 66   Temp 98.3 °F (36.8 °C) (Axillary)   Resp 18   Wt 142 lb 3.2 oz (64.5 kg)   SpO2 98%   BMI 23.66 kg/m²     Telemetry: V paced       Intake/Output:    Intake/Output Summary (Last 24 hours) at 2024 0733  Last data filed at 2024 0620  Gross per 24 hour   Intake 400 ml   Output 2900 ml   Net -2500 ml       Last 3 Weights   24 0527 142 lb 3.2 oz (64.5 kg)   24 0506 151 lb 4.8 oz (68.6 kg)   02/15/24 0600 152 lb 5.4 oz (69.1 kg)   24 0559 156 lb 1.4 oz (70.8 kg)   24 0600 158 lb 11.7 oz (72 kg)   24 0800 149 lb 14.6 oz (68 kg)   24 0000 148 lb 13 oz (67.5 kg)   24 0800 147 lb 11.3 oz (67 kg)   24 0503 147 lb 14.4 oz (67.1 kg)   24 0434 149 lb (67.6 kg)   24 0457 149 lb 8 oz (67.8 kg)   24 1108 150 lb 14.4 oz (68.4 kg)   24 0400 149 lb 6.4 oz (67.8 kg)   24 0635 148 lb 14.4 oz (67.5 kg)   24 0453 149 lb 9.6 oz (67.9 kg)   24 0032 149 lb 14.4 oz (68 kg)   24 2053 135 lb (61.2 kg)   24 0536 153 lb 11.2 oz (69.7 kg)   24 0441 151 lb 8 oz (68.7 kg)   24 0535 146 lb (66.2 kg)   24 1312 146 lb (66.2 kg)   24 0432 146 lb (66.2 kg)   24 0430 151 lb (68.5 kg)   01/15/24 0536 153 lb (69.4 kg)   24 0119 150 lb 5.7 oz (68.2 kg)   24 2034 140 lb (63.5 kg)   23 1412 145 lb (65.8 kg)       Labs:  Recent Labs   Lab 02/15/24  0440 24  0505 24  0543   * 125* 92   BUN 29* 28* 23   CREATSERUM 1.41* 1.27* 1.10*   EGFRCR 38* 44* 52*   CA 8.8 8.9 8.9    136 139   K 3.8  3.8 4.0  4.0 3.5    104 103    CO2 26.0 29.0 31.0     Recent Labs   Lab 02/14/24  0524 02/15/24  0440 02/16/24  0505 02/17/24  0543   RBC 2.98* 3.68* 4.39 4.07   HGB 8.9* 10.9* 13.2 12.4   HCT 26.9* 33.2* 39.4 37.2   MCV 90.3 90.2 89.7 91.4   MCH 29.9 29.6 30.1 30.5   MCHC 33.1 32.8 33.5 33.3   RDW 20.7* 21.4* 21.5* 21.7*   NEPRELIM 3.58 2.47 1.12*  --    WBC 4.4 4.0 3.5* 4.2   PLT 89.0* 191.0 253.0 246.0         Recent Labs   Lab 02/10/24  1238   TROPHS 53*       Diagnostics:         Review of Systems   Respiratory: Negative.     Cardiovascular: Negative.      Physical Exam:    General: Alert and oriented x 3. No apparent distress.   HEENT: Normocephalic, anicteric sclera, neck supple, no thyromegaly or adenopathy.  Neck: No JVD, carotids 2+, no bruits.  Cardiac: Regular rate & rhythm. S1, S2 normal. No pericardial rub, S3, or extra cardiac sounds. +murmur   Lungs: Clear without wheezes, rales, rhonchi or dullness.  Normal excursions and effort.  Abdomen: Soft, non-tender. No organosplenomegally, mass or rebound, BS-present.  Extremities: Without clubbing or cyanosis.  No left lower extremity edema, no right lower extremity edema.  Neurologic: Alert and oriented, normal affect. No focal defects  Skin: Warm and dry.       Medications:   spironolactone  25 mg Oral Daily    isosorbide dinitrate  10 mg Oral TID (Nitrates)    sacubitril-valsartan  1 tablet Oral BID    hydrALAZINE  25 mg Oral Q8H BROOKLYNN    bumetanide  1 mg Intravenous BID (Diuretic)    senna-docusate  2 tablet Oral Daily    amiodarone  200 mg Oral Daily    amitriptyline  25 mg Oral Nightly    ferrous sulfate  325 mg Oral Daily with breakfast    folic acid  800 mcg Oral Daily    pantoprazole  40 mg Oral BID AC    alendronate  70 mg Oral Weekly      DOBUTamine Stopped (02/14/24 1900)    milrinone in dextrose 5% Stopped (02/16/24 0578)       Assessment:    Acute on chronic decompensated systolic and diastolic heart failure, cardiogenic shock  Cardiomyopathy, EF 15%  Severe tricuspid valve  regurgitation  History of bioprosthetic mitral valve replacement  Lactic acidosis secondary to cardiogenic etiology  Left atrial mass  Paroxysmal atrial fibrillation/flutter    History of ICD, dual-chamber pacing  Thrombocytopenia, immune related  Anemia, h/o GIB  Question of infection/sepsis  Left subclavian vein nonocclusive DVT along margin of ICD lead     Plan:    Off of milrinone & dobutamine drips   Diuresing well. Net - 2.5L over 24h. Scr stable   Appears compensated on exam. On GDMT with aldactone, entresto, isordil/hydralazine.   Will switch Bumex to 1mg po bid & add coreg 3.125mg po bid. Optimize GDMT as blood pressure & renal fx permits  Monitor accurate I/o, daily wts, & renal fx closely   On amiodarone for PAF. Not on AC due to recent GIB & anemia   [facility]   Discharge planning in progress for [facility] once bed is available per RN. Will have rounding MD evaluate prior to deciding on discharge     YOLIE Bucio  2/17/2024  7:33 AM  Ph [phone] ([place])  Ph [phone] ([place])        L3  Seen and examined bedside. Agree with the present management, will sign off.  Switch Bumex to 1mg po bid & add coreg 3.125mg po bid. Optimize GDMT as blood pressure & renal fx permits.  On amiodarone for PAF. Not on AC due to recent GIB & anemia   Discharge planning in progress for [facility]    Thank you for allowing me to participate in the care of your patient, feel free to contact me if you have any questions.    Tyler Vaz MD  [facility]  Interventional Cardiology.  [phone]

## 2024-02-17 NOTE — PROGRESS NOTES
[facility]    Progress Note      Assessment and Plan:   1.  Acute on chronic respiratory failure with severe cardiomyopathy-the patient is stabilizing clinically.  Congestive heart failure is much improved.    Recommendations:  1.  As per cardiology.  Will sign off.  Please call if we can help further.  If patient goes to [facility], we can follow at that facility.    2.  Thrombocytopenia-resolved.    3.  Cardiomyopathy-as per cardiology    4.  DVT prophylaxis-SCDs temporarily in patient with thrombocytopenia.    5.  Anemia-hemoglobin is now 10.9.  Will follow.    Subjective:   Margaret Silverio is a(n) 78 year old female who is breathing more comfortably and is off PAP therapy    Objective:   Blood pressure 151/63, pulse 67, temperature 98.4 °F (36.9 °C), temperature source Oral, resp. rate 18, weight 142 lb 3.2 oz (64.5 kg), SpO2 96%.    Physical Exam easily arousable black female  HEENT examination is unremarkable with pupils equal round and reactive to light and accommodation.   Neck without adenopathy, thyromegaly, JVD nor bruit.   Lungs diminished with basilar crackles to auscultation and percussion.  Cardiac regular rate and rhythm 1 out of 6 systolic ejection murmur.   Abdomen nontender, without hepatosplenomegaly and no mass appreciable.   Extremities without clubbing cyanosis nor edema.   Neurologic grossly intact with symmetric tone and strength and reflex.  Skin without gross abnormality     Results:     Lab Results   Component Value Date    WBC 4.2 02/17/2024    HGB 12.4 02/17/2024    HCT 37.2 02/17/2024    .0 02/17/2024    CREATSERUM 1.10 02/17/2024    BUN 23 02/17/2024     02/17/2024    K 3.5 02/17/2024     02/17/2024    CO2 31.0 02/17/2024    GLU 92 02/17/2024    CA 8.9 02/17/2024    MG 1.7 02/17/2024     Chest x-ray-partial clearing of the right basilar haziness    Aroldo Pacheco MD  Medical Director, Critical Care, [facility]  Medical Director, [facility]  Sleep Center  Pager: [phone]

## 2024-02-17 NOTE — PLAN OF CARE
Patient off all drips today, hope for discharge tomorrow pending insurance and medical clearance. Pain controlled with PRN norco.     Problem: Patient Centered Care  Goal: Patient preferences are identified and integrated in the patient's plan of care  Description: Interventions:  - What would you like us to know as we care for you? I live with my  and two grandchildren  - Provide timely, complete, and accurate information to patient/family  - Incorporate patient and family knowledge, values, beliefs, and cultural backgrounds into the planning and delivery of care  - Encourage patient/family to participate in care and decision-making at the level they choose  - Honor patient and family perspectives and choices  Outcome: Progressing     Problem: Patient/Family Goals  Goal: Patient/Family Long Term Goal  Description: Patient's Long Term Goal: To get better    Interventions:  - IV diuretics, prn O2 supplementation, cardiac monitoring and electrolyte protocol  - See additional Care Plan goals for specific interventions  Outcome: Progressing  Goal: Patient/Family Short Term Goal  Description: Patient's Short Term Goal: To breathe better    Interventions:   - IV diuretics, O2 supplementation prn, cardiac monitoring, electrolyte protocol  - See additional Care Plan goals for specific interventions  Outcome: Progressing     Problem: CARDIOVASCULAR - ADULT  Goal: Maintains optimal cardiac output and hemodynamic stability  Description: INTERVENTIONS:  - Monitor vital signs, rhythm, and trends  - Monitor for bleeding, hypotension and signs of decreased cardiac output  - Evaluate effectiveness of vasoactive medications to optimize hemodynamic stability  - Monitor arterial and/or venous puncture sites for bleeding and/or hematoma  - Assess quality of pulses, skin color and temperature  - Assess for signs of decreased coronary artery perfusion - ex. Angina  - Evaluate fluid balance, assess for edema, trend  weights  Outcome: Progressing  Goal: Absence of cardiac arrhythmias or at baseline  Description: INTERVENTIONS:  - Continuous cardiac monitoring, monitor vital signs, obtain 12 lead EKG if indicated  - Evaluate effectiveness of antiarrhythmic and heart rate control medications as ordered  - Initiate emergency measures for life threatening arrhythmias  - Monitor electrolytes and administer replacement therapy as ordered  Outcome: Progressing     Problem: RESPIRATORY - ADULT  Goal: Achieves optimal ventilation and oxygenation  Description: INTERVENTIONS:  - Assess for changes in respiratory status  - Assess for changes in mentation and behavior  - Position to facilitate oxygenation and minimize respiratory effort  - Oxygen supplementation based on oxygen saturation or ABGs  - Provide Smoking Cessation handout, if applicable  - Encourage broncho-pulmonary hygiene including cough, deep breathe, Incentive Spirometry  - Assess the need for suctioning and perform as needed  - Assess and instruct to report SOB or any respiratory difficulty  - Respiratory Therapy support as indicated  - Manage/alleviate anxiety  - Monitor for signs/symptoms of CO2 retention  Outcome: Progressing     Problem: GENITOURINARY - ADULT  Goal: Absence of urinary retention  Description: INTERVENTIONS:  - Assess patient’s ability to void and empty bladder  - Monitor intake/output and perform bladder scan as needed  - Follow urinary retention protocol/standard of care  - Consider collaborating with pharmacy to review patient's medication profile  - Implement strategies to promote bladder emptying  Outcome: Progressing     Problem: Impaired Functional Mobility  Goal: Achieve highest/safest level of mobility/gait  Description: Interventions:  - Assess patient's functional ability and stability  - Promote increasing activity/tolerance for mobility and gait  - Educate and engage patient/family in tolerated activity level and precautions  - Recommend use  of sit-stand lift for transfers  Outcome: Progressing

## 2024-02-18 VITALS
DIASTOLIC BLOOD PRESSURE: 75 MMHG | SYSTOLIC BLOOD PRESSURE: 132 MMHG | HEART RATE: 70 BPM | WEIGHT: 137.19 LBS | RESPIRATION RATE: 18 BRPM | TEMPERATURE: 100 F | BODY MASS INDEX: 23 KG/M2 | OXYGEN SATURATION: 97 %

## 2024-02-18 LAB
ANION GAP SERPL CALC-SCNC: 4 MMOL/L (ref 0–18)
BUN BLD-MCNC: 22 MG/DL (ref 9–23)
BUN/CREAT SERPL: 19 (ref 10–20)
CALCIUM BLD-MCNC: 9.3 MG/DL (ref 8.7–10.4)
CHLORIDE SERPL-SCNC: 103 MMOL/L (ref 98–112)
CO2 SERPL-SCNC: 33 MMOL/L (ref 21–32)
CREAT BLD-MCNC: 1.16 MG/DL
DEPRECATED RDW RBC AUTO: 63.7 FL (ref 35.1–46.3)
EGFRCR SERPLBLD CKD-EPI 2021: 49 ML/MIN/1.73M2 (ref 60–?)
ERYTHROCYTE [DISTWIDTH] IN BLOOD BY AUTOMATED COUNT: 21 % (ref 11–15)
GLUCOSE BLD-MCNC: 85 MG/DL (ref 70–99)
HCT VFR BLD AUTO: 32.8 %
HGB BLD-MCNC: 10.9 G/DL
MAGNESIUM SERPL-MCNC: 1.8 MG/DL (ref 1.6–2.6)
MCH RBC QN AUTO: 30.4 PG (ref 26–34)
MCHC RBC AUTO-ENTMCNC: 33.2 G/DL (ref 31–37)
MCV RBC AUTO: 91.4 FL
OSMOLALITY SERPL CALC.SUM OF ELEC: 293 MOSM/KG (ref 275–295)
PLATELET # BLD AUTO: 248 10(3)UL (ref 150–450)
POTASSIUM SERPL-SCNC: 4.4 MMOL/L (ref 3.5–5.1)
RBC # BLD AUTO: 3.59 X10(6)UL
SODIUM SERPL-SCNC: 140 MMOL/L (ref 136–145)
WBC # BLD AUTO: 6.3 X10(3) UL (ref 4–11)

## 2024-02-18 PROCEDURE — 99239 HOSP IP/OBS DSCHRG MGMT >30: CPT | Performed by: HOSPITALIST

## 2024-02-18 RX ORDER — BUMETANIDE 1 MG/1
1 TABLET ORAL
Qty: 60 TABLET | Refills: 1 | Status: SHIPPED | OUTPATIENT
Start: 2024-02-18

## 2024-02-18 RX ORDER — CARVEDILOL 3.12 MG/1
3.12 TABLET ORAL 2 TIMES DAILY WITH MEALS
Qty: 60 TABLET | Refills: 1 | Status: SHIPPED | OUTPATIENT
Start: 2024-02-18

## 2024-02-18 RX ORDER — SPIRONOLACTONE 25 MG/1
25 TABLET ORAL DAILY
Qty: 30 TABLET | Refills: 1 | Status: SHIPPED | OUTPATIENT
Start: 2024-02-18

## 2024-02-18 RX ORDER — ISOSORBIDE DINITRATE 10 MG/1
10 TABLET ORAL
Qty: 90 TABLET | Refills: 1 | Status: SHIPPED | OUTPATIENT
Start: 2024-02-18

## 2024-02-18 RX ORDER — HYDRALAZINE HYDROCHLORIDE 25 MG/1
25 TABLET, FILM COATED ORAL EVERY 8 HOURS SCHEDULED
Qty: 90 TABLET | Refills: 1 | Status: SHIPPED | OUTPATIENT
Start: 2024-02-18

## 2024-02-18 RX ORDER — MAGNESIUM OXIDE 400 MG/1
400 TABLET ORAL ONCE
Status: COMPLETED | OUTPATIENT
Start: 2024-02-18 | End: 2024-02-18

## 2024-02-18 NOTE — PLAN OF CARE
Ivs and tele removed. Discharge discussed with daughter and pt. Report given to Paulette at [facility].     Problem: Patient Centered Care  Goal: Patient preferences are identified and integrated in the patient's plan of care  Description: Interventions:  - What would you like us to know as we care for you? I live with my  and two grandchildren  - Provide timely, complete, and accurate information to patient/family  - Incorporate patient and family knowledge, values, beliefs, and cultural backgrounds into the planning and delivery of care  - Encourage patient/family to participate in care and decision-making at the level they choose  - Honor patient and family perspectives and choices  2/18/2024 1406 by Rachele Larry RN  Outcome: Adequate for Discharge  2/18/2024 1406 by Rachele Larry RN  Outcome: Progressing     Problem: Patient/Family Goals  Goal: Patient/Family Long Term Goal  Description: Patient's Long Term Goal: To get better    Interventions:  - IV diuretics, prn O2 supplementation, cardiac monitoring and electrolyte protocol  - See additional Care Plan goals for specific interventions  2/18/2024 1406 by Rachele Larry RN  Outcome: Adequate for Discharge  2/18/2024 1406 by Rachele Larry RN  Outcome: Progressing  Goal: Patient/Family Short Term Goal  Description: Patient's Short Term Goal: To breathe better    Interventions:   - IV diuretics, O2 supplementation prn, cardiac monitoring, electrolyte protocol  - See additional Care Plan goals for specific interventions  2/18/2024 1406 by Rachele Larry RN  Outcome: Adequate for Discharge  2/18/2024 1406 by Rachele Larry RN  Outcome: Progressing     Problem: CARDIOVASCULAR - ADULT  Goal: Maintains optimal cardiac output and hemodynamic stability  Description: INTERVENTIONS:  - Monitor vital signs, rhythm, and trends  - Monitor for bleeding, hypotension and signs of decreased cardiac output  - Evaluate effectiveness of vasoactive medications to optimize hemodynamic  stability  - Monitor arterial and/or venous puncture sites for bleeding and/or hematoma  - Assess quality of pulses, skin color and temperature  - Assess for signs of decreased coronary artery perfusion - ex. Angina  - Evaluate fluid balance, assess for edema, trend weights  2/18/2024 1406 by Rachele Larry RN  Outcome: Adequate for Discharge  2/18/2024 1406 by Rachele Larry RN  Outcome: Progressing  Goal: Absence of cardiac arrhythmias or at baseline  Description: INTERVENTIONS:  - Continuous cardiac monitoring, monitor vital signs, obtain 12 lead EKG if indicated  - Evaluate effectiveness of antiarrhythmic and heart rate control medications as ordered  - Initiate emergency measures for life threatening arrhythmias  - Monitor electrolytes and administer replacement therapy as ordered  2/18/2024 1406 by Rachele Larry RN  Outcome: Adequate for Discharge  2/18/2024 1406 by Rachele Larry RN  Outcome: Progressing     Problem: RESPIRATORY - ADULT  Goal: Achieves optimal ventilation and oxygenation  Description: INTERVENTIONS:  - Assess for changes in respiratory status  - Assess for changes in mentation and behavior  - Position to facilitate oxygenation and minimize respiratory effort  - Oxygen supplementation based on oxygen saturation or ABGs  - Provide Smoking Cessation handout, if applicable  - Encourage broncho-pulmonary hygiene including cough, deep breathe, Incentive Spirometry  - Assess the need for suctioning and perform as needed  - Assess and instruct to report SOB or any respiratory difficulty  - Respiratory Therapy support as indicated  - Manage/alleviate anxiety  - Monitor for signs/symptoms of CO2 retention  2/18/2024 1406 by Rachele Larry RN  Outcome: Adequate for Discharge  2/18/2024 1406 by Rachele Larry RN  Outcome: Progressing     Problem: GENITOURINARY - ADULT  Goal: Absence of urinary retention  Description: INTERVENTIONS:  - Assess patient’s ability to void and empty bladder  - Monitor intake/output and perform  bladder scan as needed  - Follow urinary retention protocol/standard of care  - Consider collaborating with pharmacy to review patient's medication profile  - Implement strategies to promote bladder emptying  2/18/2024 1406 by Rachele Larry RN  Outcome: Adequate for Discharge  2/18/2024 1406 by Rachele Larry RN  Outcome: Progressing     Problem: Impaired Functional Mobility  Goal: Achieve highest/safest level of mobility/gait  Description: Interventions:  - Assess patient's functional ability and stability  - Promote increasing activity/tolerance for mobility and gait  - Educate and engage patient/family in tolerated activity level and precautions    2/18/2024 1406 by Rachele Larry RN  Outcome: Adequate for Discharge  2/18/2024 1406 by Rachele Larry RN  Outcome: Progressing

## 2024-02-18 NOTE — CM/SW NOTE
02/18/24 1000   Discharge disposition   Expected discharge disposition [facility]   Post Acute Care Provider   ( [facility])   Discharge transportation [facility]  (2pm per Morena Christian)     [facility]    RN to inform pt/family of discharge.    RN to call report to /EL at [phone].     OTIS Kirk, LSW  Social Work   Ext:#[phone]

## 2024-02-18 NOTE — PLAN OF CARE
Problem: CARDIOVASCULAR - ADULT  Goal: Maintains optimal cardiac output and hemodynamic stability  Description: INTERVENTIONS:  - Monitor vital signs, rhythm, and trends  - Monitor for bleeding, hypotension and signs of decreased cardiac output  - Evaluate effectiveness of vasoactive medications to optimize hemodynamic stability  - Monitor arterial and/or venous puncture sites for bleeding and/or hematoma  - Assess quality of pulses, skin color and temperature  - Assess for signs of decreased coronary artery perfusion - ex. Angina  - Evaluate fluid balance, assess for edema, trend weights  Outcome: Progressing  Goal: Absence of cardiac arrhythmias or at baseline  Description: INTERVENTIONS:  - Continuous cardiac monitoring, monitor vital signs, obtain 12 lead EKG if indicated  - Evaluate effectiveness of antiarrhythmic and heart rate control medications as ordered  - Initiate emergency measures for life threatening arrhythmias  - Monitor electrolytes and administer replacement therapy as ordered  Outcome: Progressing     Problem: RESPIRATORY - ADULT  Goal: Achieves optimal ventilation and oxygenation  Description: INTERVENTIONS:  - Assess for changes in respiratory status  - Assess for changes in mentation and behavior  - Position to facilitate oxygenation and minimize respiratory effort  - Oxygen supplementation based on oxygen saturation or ABGs  - Provide Smoking Cessation handout, if applicable  - Encourage broncho-pulmonary hygiene including cough, deep breathe, Incentive Spirometry  - Assess the need for suctioning and perform as needed  - Assess and instruct to report SOB or any respiratory difficulty  - Respiratory Therapy support as indicated  - Manage/alleviate anxiety  - Monitor for signs/symptoms of CO2 retention  Outcome: Progressing     Problem: GENITOURINARY - ADULT  Goal: Absence of urinary retention  Description: INTERVENTIONS:  - Assess patient’s ability to void and empty bladder  - Monitor  intake/output and perform bladder scan as needed  - Follow urinary retention protocol/standard of care  - Consider collaborating with pharmacy to review patient's medication profile  - Implement strategies to promote bladder emptying  Outcome: Progressing     Received awake,oriented.Slept fairly during the night.Norco given for pain.Plans for discharge to [facility] today. Sister stayed overnight. Will continue to monitor patient.

## 2024-02-18 NOTE — DISCHARGE SUMMARY
[facility]    Discharge Summary    Margaret Silverio Patient Status:  Inpatient    3/14/1945 MRN [id number]   Location [facility] 3W/SW Attending Raiza Alvarez MD   Hosp Day # 15 PCP Johnathon Rodriguez DO     Date of Admission: 2024   Date of Discharge: 2024    Hospital Discharge Diagnoses: Acute on chronic Systolic CHF    Lace+ Score: 82  59-90 High Risk  29-58 Medium Risk  0-28   Low Risk.    TCM Follow-Up Recommendation:  LACE > 58: High Risk of readmission after discharge from the hospital.        Admitting Diagnosis: Acute on chronic congestive heart failure, unspecified heart failure type (HCC) [I50.9]    Disposition: Home    Discharge Diagnosis: .Principal Problem:    Acute on chronic congestive heart failure, unspecified heart failure type (HCC)  Active Problems:    Pancytopenia (HCC)    Goals of care, counseling/discussion    Advance care planning    Palliative care by specialist    Acute deep vein thrombosis (DVT) of left upper extremity (HCC)    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HCC)    Cardiogenic shock (HCC)    HFrEF (heart failure with reduced ejection fraction) (HCC)    Anemia      Hospital Course:   Reason for Admission:     Discharge Physical Exam:   Physical Exam:    General: No acute distress.   Respiratory: Clear to auscultation bilaterally. No wheezes. No rhonchi.  Cardiovascular: S1, S2. Regular rate and rhythm. No murmurs, rubs or gallops.   Abdomen: Soft, nontender, nondistended.  Positive bowel sounds. No rebound or guarding.  Neurologic: No focal neurological deficits.   Musculoskeletal: Moves all extremities.    Hospital Course:     Acute respiratory failure 2/2 cardiogenic shock   Repeat CXR with CHF.   On IV bumex BID- bumex drip discontinued   Apprec pulm and cards consult   Repeat echo with EF 15%, severe TR  Blood cxs sent - ntd. Antibiotics discontinued   Dobutamine discontinued  Milrinone weaned off today   Strict I/o and daily wts   Monitor  creatinine closely which is rising. Renal consulted - apprec input   PAP therapy prn  Change IV bumex to PO 1 mg BID  Coreg 3.125 BID   Continue on Entresto  Discharge to [facility] today               2. Pancytopenia/ worsening severe acute TCP:  -had similar issue on last admission Dr Snyder was consulted; thought to be consumptive in seeing of acute illness vs drug induced at the time. No h/o heparin use   - apprec Hem input - continue on prednisone   - haptoglobin decreased and ldh elevated   - Had one unit of blood 2/12   - supportive transfusion for platelets      3. Paroxysmal atrial fibrillation   Stop oral anticoagulation given recent GI bleed  Currently rate controlled  Still considering outpatient Watchman procedure given her recurrent GI bleeds     4. Chronic kidney injury stage III  - creat stable ; 200 out   - strict I/o's   - daily wts       5. Subclavian clot on icd lead  - platelets are better- not a candidate for AC due to history of GI bleed     6 HTN  -Continue on hydralazine   - Isordil increased to 10 mg PO TID        DVT ppx scds      Discussed with pt's daughter Barbi with [place] [facility]. She understands her heart fx is much worse. Discussed with Dr rL.   Patient is DNR/Select.         Raiza Alvarez MD    Complications: none    Consultants         Provider   Role Specialty     Alfred Crane MD  Consulting Physician Hematology and Oncology     Will Gonzalez MD  Consulting Physician NEPHROLOGY     Aroldo Pacheco MD  Consulting Physician PULMONARY DISEASES     Briana Snyder MD  Consulting Physician Hematology and Oncology     Kayla Wood APRN  Consulting Physician Nurse Practitioner                Discharge Plan:   Discharge Condition: Stable    Current Discharge Medication List        New Orders    Details   SACUBITRIL-VALSARTAN 49-51 MG Oral Tab Take 1 tablet by mouth 2 (two) times daily.      BUMETANIDE 1 MG Oral Tab Take 1 tablet (1 mg total) by mouth BID  (Diuretic).      HYDRALAZINE 25 MG Oral Tab Take 1 tablet (25 mg total) by mouth every 8 (eight) hours.      ISOSORBIDE DINITRATE 10 MG Oral Tab Take 1 tablet (10 mg total) by mouth TID (Nitrates).      SPIRONOLACTONE 25 MG Oral Tab Take 1 tablet (25 mg total) by mouth daily.      senna-docusate 8.6-50 MG Oral Tab Take 2 tablets by mouth daily.           Home Meds - Modified    Details   CARVEDILOL 3.125 MG Oral Tab Take 1 tablet (3.125 mg total) by mouth 2 (two) times daily with meals.      HYDROcodone-acetaminophen (NORCO)  MG Oral Tab Take 1 tablet by mouth every 6 (six) hours as needed for Pain.           Home Meds - Unchanged    Details   amiodarone 200 MG Oral Tab Take 1 tablet (200 mg total) by mouth daily.      AMITRIPTYLINE 25 MG Oral Tab TAKE 1 TABLET(25 MG) BY MOUTH EVERY NIGHT      pantoprazole 40 MG Oral Tab EC Take 1 tablet (40 mg total) by mouth 2 (two) times daily before meals.      ferrous sulfate 325 (65 FE) MG Oral Tab EC Take 1 tablet (325 mg total) by mouth daily with breakfast.      folic acid 800 MCG Oral Tab Take 1 tablet (800 mcg total) by mouth daily.      methotrexate 2.5 MG Oral Tab TAKE 6 TABLETS BY MOUTH 1 TIME A WEEK      alendronate 70 MG Oral Tab Take 1 tablet (70 mg total) by mouth once a week.                 Discharge Diet: Low Sodium Diet     Discharge Activity: As tolerated       Discharge Medications        START taking these medications        Instructions Prescription details   bumetanide 1 MG Tabs  Commonly known as: Bumex      Take 1 tablet (1 mg total) by mouth BID (Diuretic).   Quantity: 60 tablet  Refills: 1     carvedilol 3.125 MG Tabs  Commonly known as: Coreg  Replaces: carvedilol 25 MG Tabs      Take 1 tablet (3.125 mg total) by mouth 2 (two) times daily with meals.   Quantity: 60 tablet  Refills: 1     hydrALAZINE 25 MG Tabs  Commonly known as: Apresoline      Take 1 tablet (25 mg total) by mouth every 8 (eight) hours.   Quantity: 90 tablet  Refills: 1      isosorbide dinitrate 10 MG Tabs  Commonly known as: Isordil      Take 1 tablet (10 mg total) by mouth TID (Nitrates).   Quantity: 90 tablet  Refills: 1     sacubitril-valsartan 49-51 MG Tabs  Commonly known as: Entresto  Replaces: Entresto 24-26 MG Tabs      Take 1 tablet by mouth 2 (two) times daily.   Quantity: 60 tablet  Refills: 1     senna-docusate 8.6-50 MG Tabs  Commonly known as: Senokot-S      Take 2 tablets by mouth daily.   Quantity: 60 tablet  Refills: 0     spironolactone 25 MG Tabs  Commonly known as: Aldactone      Take 1 tablet (25 mg total) by mouth daily.   Quantity: 30 tablet  Refills: 1            CONTINUE taking these medications        Instructions Prescription details   alendronate 70 MG Tabs  Commonly known as: Fosamax      Take 1 tablet (70 mg total) by mouth once a week.   Quantity: 12 tablet  Refills: 0     amiodarone 200 MG Tabs  Commonly known as: Pacerone      Take 1 tablet (200 mg total) by mouth daily.   Refills: 0     amitriptyline 25 MG Tabs  Commonly known as: Elavil      TAKE 1 TABLET(25 MG) BY MOUTH EVERY NIGHT   Quantity: 30 tablet  Refills: 0     ferrous sulfate 325 (65 FE) MG Tbec      Take 1 tablet (325 mg total) by mouth daily with breakfast.   Refills: 0     folic acid 800 MCG Tabs  Commonly known as: FOLVITE      Take 1 tablet (800 mcg total) by mouth daily.   Quantity: 90 tablet  Refills: 0     HYDROcodone-acetaminophen  MG Tabs  Commonly known as: Norco      Take 1 tablet by mouth every 6 (six) hours as needed for Pain.   Quantity: 60 tablet  Refills: 0     methotrexate 2.5 MG Tabs  Commonly known as: Rheumatrex      TAKE 6 TABLETS BY MOUTH 1 TIME A WEEK   Quantity: 77 tablet  Refills: 0     pantoprazole 40 MG Tbec  Commonly known as: Protonix      Take 1 tablet (40 mg total) by mouth 2 (two) times daily before meals.   Quantity: 60 tablet  Refills: 0            STOP taking these medications      carvedilol 25 MG Tabs  Commonly known as: Coreg  Replaced by:  carvedilol 3.125 MG Tabs        Entresto 24-26 MG Tabs  Generic drug: sacubitril-valsartan  Replaced by: sacubitril-valsartan 49-51 MG Tabs        furosemide 40 MG Tabs  Commonly known as: Lasix                  Where to Get Your Medications        These medications were sent to [id number] - [place], [place] - [address] AT [facility] [address], [phone], [phone]  [address], [facility] [address]      Phone: [phone]   folic acid 800 MCG Tabs       Please  your prescriptions at the location directed by your doctor or nurse    Bring a paper prescription for each of these medications  bumetanide 1 MG Tabs  carvedilol 3.125 MG Tabs  hydrALAZINE 25 MG Tabs  HYDROcodone-acetaminophen  MG Tabs  isosorbide dinitrate 10 MG Tabs  sacubitril-valsartan 49-51 MG Tabs  senna-docusate 8.6-50 MG Tabs  spironolactone 25 MG Tabs         Follow up:      Follow-up Information       Luis Sargent MD Follow up in 1 week(s).    Specialties: Cardiac Electrophysiology, CARDIOLOGY  Why: Office will call you for follow up appt  Contact information:  [address]  [facility] [id number] [phone]               Johnathon Rodriguez, DO Follow up in 1 week(s).    Specialty: Family Medicine  Contact information:  [address] [place] [id number] [phone]                             Follow up Labs and imaging:         Other Discharge Instructions:         Residential palliative care will follow on discharge to rehab.         Time spent:  > 30 minutes    FREYA COMBS MD  2/18/2024

## 2024-02-18 NOTE — PLAN OF CARE
Patient switched to oral bumex today per cardiology, waiting for cardiologist approval for discharge. Pain controlled with norco and heating packs. Patient and family updated on plan. Awaiting discharge to [facility] pending medical clearance.     Problem: Patient Centered Care  Goal: Patient preferences are identified and integrated in the patient's plan of care  Description: Interventions:  - What would you like us to know as we care for you? I live with my  and two grandchildren  - Provide timely, complete, and accurate information to patient/family  - Incorporate patient and family knowledge, values, beliefs, and cultural backgrounds into the planning and delivery of care  - Encourage patient/family to participate in care and decision-making at the level they choose  - Honor patient and family perspectives and choices  Outcome: Progressing     Problem: Patient/Family Goals  Goal: Patient/Family Long Term Goal  Description: Patient's Long Term Goal: To get better    Interventions:  - IV diuretics, prn O2 supplementation, cardiac monitoring and electrolyte protocol  - See additional Care Plan goals for specific interventions  Outcome: Progressing  Goal: Patient/Family Short Term Goal  Description: Patient's Short Term Goal: To breathe better    Interventions:   - IV diuretics, O2 supplementation prn, cardiac monitoring, electrolyte protocol  - See additional Care Plan goals for specific interventions  Outcome: Progressing     Problem: CARDIOVASCULAR - ADULT  Goal: Maintains optimal cardiac output and hemodynamic stability  Description: INTERVENTIONS:  - Monitor vital signs, rhythm, and trends  - Monitor for bleeding, hypotension and signs of decreased cardiac output  - Evaluate effectiveness of vasoactive medications to optimize hemodynamic stability  - Monitor arterial and/or venous puncture sites for bleeding and/or hematoma  - Assess quality of pulses, skin color and temperature  - Assess  for signs of decreased coronary artery perfusion - ex. Angina  - Evaluate fluid balance, assess for edema, trend weights  Outcome: Progressing  Goal: Absence of cardiac arrhythmias or at baseline  Description: INTERVENTIONS:  - Continuous cardiac monitoring, monitor vital signs, obtain 12 lead EKG if indicated  - Evaluate effectiveness of antiarrhythmic and heart rate control medications as ordered  - Initiate emergency measures for life threatening arrhythmias  - Monitor electrolytes and administer replacement therapy as ordered  Outcome: Progressing     Problem: RESPIRATORY - ADULT  Goal: Achieves optimal ventilation and oxygenation  Description: INTERVENTIONS:  - Assess for changes in respiratory status  - Assess for changes in mentation and behavior  - Position to facilitate oxygenation and minimize respiratory effort  - Oxygen supplementation based on oxygen saturation or ABGs  - Provide Smoking Cessation handout, if applicable  - Encourage broncho-pulmonary hygiene including cough, deep breathe, Incentive Spirometry  - Assess the need for suctioning and perform as needed  - Assess and instruct to report SOB or any respiratory difficulty  - Respiratory Therapy support as indicated  - Manage/alleviate anxiety  - Monitor for signs/symptoms of CO2 retention  Outcome: Progressing     Problem: GENITOURINARY - ADULT  Goal: Absence of urinary retention  Description: INTERVENTIONS:  - Assess patient’s ability to void and empty bladder  - Monitor intake/output and perform bladder scan as needed  - Follow urinary retention protocol/standard of care  - Consider collaborating with pharmacy to review patient's medication profile  - Implement strategies to promote bladder emptying  Outcome: Progressing     Problem: Impaired Functional Mobility  Goal: Achieve highest/safest level of mobility/gait  Description: Interventions:  - Assess patient's functional ability and stability  - Promote increasing activity/tolerance for  mobility and gait  - Educate and engage patient/family in tolerated activity level and precautions  - Recommend use of total lift for transfers  Outcome: Progressing

## 2024-02-19 ENCOUNTER — INITIAL APN SNF VISIT (OUTPATIENT)
Dept: INTERNAL MEDICINE CLINIC | Facility: SKILLED NURSING FACILITY | Age: 79
End: 2024-02-19

## 2024-02-19 ENCOUNTER — PATIENT OUTREACH (OUTPATIENT)
Dept: CASE MANAGEMENT | Age: 79
End: 2024-02-19

## 2024-02-19 VITALS
WEIGHT: 137 LBS | RESPIRATION RATE: 18 BRPM | BODY MASS INDEX: 23 KG/M2 | TEMPERATURE: 98 F | HEART RATE: 72 BPM | DIASTOLIC BLOOD PRESSURE: 76 MMHG | SYSTOLIC BLOOD PRESSURE: 132 MMHG | OXYGEN SATURATION: 98 %

## 2024-02-19 DIAGNOSIS — R62.7 FAILURE TO THRIVE IN ADULT: Primary | ICD-10-CM

## 2024-02-19 DIAGNOSIS — M05.79 RHEUMATOID ARTHRITIS INVOLVING MULTIPLE SITES WITH POSITIVE RHEUMATOID FACTOR (HCC): ICD-10-CM

## 2024-02-19 DIAGNOSIS — M06.9 RHEUMATOID ARTHRITIS OF HAND, UNSPECIFIED LATERALITY, UNSPECIFIED WHETHER RHEUMATOID FACTOR PRESENT (HCC): ICD-10-CM

## 2024-02-19 DIAGNOSIS — M54.2 BILATERAL POSTERIOR NECK PAIN: ICD-10-CM

## 2024-02-19 DIAGNOSIS — I48.0 PAROXYSMAL ATRIAL FIBRILLATION (HCC): ICD-10-CM

## 2024-02-19 DIAGNOSIS — R52 PAIN: ICD-10-CM

## 2024-02-19 DIAGNOSIS — J96.01 ACUTE RESPIRATORY FAILURE WITH HYPOXIA (HCC): ICD-10-CM

## 2024-02-19 DIAGNOSIS — F32.A DEPRESSION, UNSPECIFIED DEPRESSION TYPE: ICD-10-CM

## 2024-02-19 DIAGNOSIS — D61.818 PANCYTOPENIA (HCC): ICD-10-CM

## 2024-02-19 DIAGNOSIS — R57.0 CARDIOGENIC SHOCK (HCC): ICD-10-CM

## 2024-02-19 DIAGNOSIS — M51.36 LUMBAR DEGENERATIVE DISC DISEASE: ICD-10-CM

## 2024-02-19 PROCEDURE — 3078F DIAST BP <80 MM HG: CPT | Performed by: NURSE PRACTITIONER

## 2024-02-19 PROCEDURE — 99310 SBSQ NF CARE HIGH MDM 45: CPT | Performed by: NURSE PRACTITIONER

## 2024-02-19 PROCEDURE — 1123F ACP DISCUSS/DSCN MKR DOCD: CPT | Performed by: NURSE PRACTITIONER

## 2024-02-19 PROCEDURE — 1126F AMNT PAIN NOTED NONE PRSNT: CPT | Performed by: NURSE PRACTITIONER

## 2024-02-19 PROCEDURE — 1160F RVW MEDS BY RX/DR IN RCRD: CPT | Performed by: NURSE PRACTITIONER

## 2024-02-19 PROCEDURE — 3075F SYST BP GE 130 - 139MM HG: CPT | Performed by: NURSE PRACTITIONER

## 2024-02-19 PROCEDURE — 1159F MED LIST DOCD IN RCRD: CPT | Performed by: NURSE PRACTITIONER

## 2024-02-19 RX ORDER — HYDROCODONE BITARTRATE AND ACETAMINOPHEN 10; 325 MG/1; MG/1
1 TABLET ORAL EVERY 6 HOURS PRN
Qty: 60 TABLET | Refills: 0 | Status: SHIPPED | OUTPATIENT
Start: 2024-02-19

## 2024-02-19 NOTE — TELEPHONE ENCOUNTER
NO PROTOCOL==sent to PCP for approval.   Pended medication .     Per medication hx=Hydrocodone was prescribed by Dr Rodriguez except for the month of Feb .  Patient was admitted inpatient at [facility] from 2/2/24 up to 2/18/24 .    No future appointments.

## 2024-02-19 NOTE — TELEPHONE ENCOUNTER
[facility] called on the patients behalf stating that the patient has been taking Norco. She states that the patient has been asking about the medication because she is experiencing pain. Per chart this was dispensed on 02/16/2024 by KODI Mahan with 60 pills...

## 2024-02-19 NOTE — PROGRESS NOTES
Margaret Silverio  : 3/14/1945  Age 78 year old  female patient is admitted to   [facility] 24 for rehab and strengthening     Chief complaint: Acute on chronic Systolic CHF     [facility] Admission :24 to 24     HPI   78 year old female with well-known to [facility]  from multiple previous admissions she was just discharged on .  She had been admitted for GI bleed and found to have AVMs.  She was discharged off of oral anticoagulation and has a history of atrial fibrillation.  She returned to [facility] ED 24  with increased weakness and shortness of breath. In the emergency room she was found to have an elevated BNP of 18,762.  She had a normal white count hemoglobin of 9.5 platelet count of 184.  Blood sugar was low at 55, creatinine was 1.47 which appears to be baseline with a GFR of 37 troponin was negative.  She was treated with Lasix 40 mg IV.  She was admitted for further work up .  24 had pancytopenia . On 24 diagnosed with  acute DVT of the left upper extremity .   Palliative care was consulted 24 with advanced care planning done. 24 diagnosed with immune thrombocytopenia.  24 treated for  acute respiratory failure  to  cardiogenic shock  was in ICU,   Followed closely by Cards, pulm and Renal in hospital.    Patient was stabilized and transferred to [facility] for continued monitoring and conditioning .     Patient seen as a new admission, vss no reported fevers or chills.  Patient was examined while in bed , she appears very weak and lethargic and ill appearing , she reports she doesn't want to talk, but was cooperative . She reports she has not participated in therapy feeling too weak. We discussed how this could make her stronger. No lower leg edema noted. HR regular. Lings clear.  Denies changes in bowels or bladder.  Denies pain. No other new issues or concerns. She did see Palliative care in Hospital, again discussed if Palliative to follow  her in rehab, she wasn't sure she wanted it, which she would greatly benefit from. Appetite poor, reports is drinking fluids. Will need to be monitored closely.     Past Medical History:   Diagnosis Date    Anemia     Arrhythmia     Arthritis     Deep vein thrombosis (HCC)     Essential hypertension     High blood pressure     History of blood transfusion     Seizure disorder (HCC)     Seizures (HCC)      Past Surgical History:   Procedure Laterality Date    COLONOSCOPY N/A 01/17/2024    Dr. Rios    COLONOSCOPY N/A 1/17/2024    Procedure: COLONOSCOPY;  Surgeon: Zoraida Rios MD;  [facility] ENDOSCOPY    EGD N/A 01/17/2024    Dr. Rios    OTHER SURGICAL HISTORY  2017    Heart Surgery     OTHER SURGICAL HISTORY  2020    Bilateral shoulder replacement      No family history on file.  Social History     Socioeconomic History    Marital status:    Tobacco Use    Smoking status: Former     Packs/day: .25     Types: Cigarettes     Quit date: 2014     Years since quitting: 10.1    Smokeless tobacco: Never   Vaping Use    Vaping Use: Never used   Substance and Sexual Activity    Alcohol use: Never    Drug use: Never     Social Determinants of Health     Financial Resource Strain: Low Risk  (1/23/2024)    Financial Resource Strain     Med Affordability: No   Food Insecurity: No Food Insecurity (2/3/2024)    Food Insecurity     Food Insecurity: Never true   Transportation Needs: No Transportation Needs (2/3/2024)    Transportation Needs     Lack of Transportation: No   Housing Stability: Low Risk  (2/3/2024)    Housing Stability     Housing Instability: No       ALLERGIES:  Allergies   Allergen Reactions    Lisinopril Coughing       CODE STATUS:  DNAR    ADVANCED CARE PLANNING TEAM:will need family care plan       CURRENT MEDICATIONS - reviewed and updated     Current Outpatient Medications   Medication Sig Dispense Refill    CARVEDILOL 3.125 MG Oral Tab Take 1 tablet (3.125 mg total) by mouth 2 (two) times daily with  meals. 60 tablet 1    SACUBITRIL-VALSARTAN 49-51 MG Oral Tab Take 1 tablet by mouth 2 (two) times daily. 60 tablet 1    BUMETANIDE 1 MG Oral Tab Take 1 tablet (1 mg total) by mouth BID (Diuretic). 60 tablet 1    HYDRALAZINE 25 MG Oral Tab Take 1 tablet (25 mg total) by mouth every 8 (eight) hours. 90 tablet 1    ISOSORBIDE DINITRATE 10 MG Oral Tab Take 1 tablet (10 mg total) by mouth TID (Nitrates). 90 tablet 1    SPIRONOLACTONE 25 MG Oral Tab Take 1 tablet (25 mg total) by mouth daily. 30 tablet 1    HYDROcodone-acetaminophen (NORCO)  MG Oral Tab Take 1 tablet by mouth every 6 (six) hours as needed for Pain. 60 tablet 0    senna-docusate 8.6-50 MG Oral Tab Take 2 tablets by mouth daily. 60 tablet 0    folic acid 800 MCG Oral Tab Take 1 tablet (800 mcg total) by mouth daily. 90 tablet 0    amiodarone 200 MG Oral Tab Take 1 tablet (200 mg total) by mouth daily.      AMITRIPTYLINE 25 MG Oral Tab TAKE 1 TABLET(25 MG) BY MOUTH EVERY NIGHT 30 tablet 0    pantoprazole 40 MG Oral Tab EC Take 1 tablet (40 mg total) by mouth 2 (two) times daily before meals. 60 tablet 0    methotrexate 2.5 MG Oral Tab TAKE 6 TABLETS BY MOUTH 1 TIME A WEEK 77 tablet 0    alendronate 70 MG Oral Tab Take 1 tablet (70 mg total) by mouth once a week. 12 tablet 0    ferrous sulfate 325 (65 FE) MG Oral Tab EC Take 1 tablet (325 mg total) by mouth daily with breakfast.         VITALS:  /76   Pulse 72   Temp 97.8 °F (36.6 °C)   Resp 18   Wt 137 lb (62.1 kg)   SpO2 98%   BMI 22.80 kg/m²      REVIEW OF SYSTEMS:  79 y/o very fragile, thin and ill appearing female. Cooperative on exam and when question but reports prefers not to talk to much today, reports feeling very tired. Lethargic in nature. Reports appetite poor, but reports is drinking fluids.  Labs are due in am .     PHYSICAL EXAM:  GENERAL HEALTH: thin , fragile , lethargic appearing female, not wanting to talk but di cooperate , reports too weak to participate in therapy    LINES, TUBES, DRAINS:  none  SKIN: no rashes, no suspicious lesions  WOUND: nine  EYES: PERRLA, EOMI, sclera anicteric, conjunctiva normal; there is no nystagmus, no drainage from eyes  HENT: normocephalic; normal nose, no nasal drainage, mucous membranes pink, moist, pharynx no exudate, no visible cerumen.  NECK: supple; FROM; no JVD, no TMG, no carotid bruits, obvious pacemaker noted on chest   BREAST: deferred  RESPIRATORY:clear to percussion and auscultation  CARDIOVASCULAR: S1, S2 normal, RRR; no S3, no S4;   ABDOMEN:  normal active BS+, soft, nondistended; no organomegaly, no masses; no bruits; nontender, no guarding, no rebound tenderness.  :no suprapubic distension  LYMPHATIC:no lymphedema  MUSCULOSKELETAL: generalized weakness, unclear if she walks - very weak   EXTREMITIES/VASCULAR:no cyanosis, clubbing or edema  NEUROLOGIC: follows commands  PSYCHIATRIC: lethargic, oriented x 3, does not want to participate today, reporting just not feeling well overall       MEDICAL DECISION MAKING  Capable     DIAGNOSTICS REVIEWED AT THIS VISIT:  Reviewed [facility] records     SEE PLAN BELOW  Acute respiratory failure 2/2 cardiogenic shock   -Repeat CXR with CHF.   -On IV bumex BID- bumex drip discontinued in hospital   - pulm and cards followed in hospital   -Pulm and Cards to follow in rehab    - Repeat echo with EF 15%, severe TR  - Blood cxs sent - ntd. Antibiotics discontinued in hospital  -Dobutamine discontinued in hospital   - Milrinone weaned off  2/18/24  -Strict I/o and daily wts   -Monitor creatinine closely which had been  rising.   -Renal followed in hospital   - PAP therapy prn when in hospital   -Changed IV bumex to PO 1 mg BID  -cont Coreg 3.125 BID   -Continue on Entresto 24-26 mg po bid   -cont amiodarone 200mg po daily   -monitor      2. Pancytopenia/ worsening severe acute TCP:  -had similar issue on last admission Dr Snyder was consulted; thought to be consumptive in seeing of acute illness vs drug  induced at the time.   -No h/o heparin use   - Hem following in hospital  - continue on prednisone   - haptoglobin decreased and ldh elevated   - Had one unit of blood 2/12   - supportive transfusion for platelets   -weekly cbc and bmp in rehab and prn       3. Paroxysmal atrial fibrillation   -Stop oral anticoagulation given recent GI bleed  -Currently rate controlled  -Still considering outpatient Watchman procedure given her recurrent GI bleeds  -Cards to follow in rehab     4. Chronic kidney injury stage III  - creat stable ; 200 out   - strict I/o's   - daily wts   -weekly cbc and bmp in rehab      5. Subclavian clot on icd lead  - platelets are better  - not a candidate for AC due to history of GI bleed     6 HTN  -vs q shift   -Continue on hydralazine 25mg po q 8 hrs   - Isordil increased to 10 mg PO TID  -monitor     7. Edema  -cont spironolactone 25mg po daily   -cont bumex 1 mg po bid     8. Pain control  -cont norco 10mg /325mg po q 6 prn     9. Depression   -cont amitriptyline 25mg po q hs   -possible failure to thrive   -Psych to see if she agrees in rehab    10. Vitamins   -cont mvi po daily   -cont ascorbic acid 500mg po q day   -cont iron tabs po daily   -cont alendronate sodium 70mg q weds   -cont folic acid 800 mcg po daily   -cont zinc 220mg po daily x 14 days     11. Inflammation /RA  -cont methotrexate 15mg po every Tuesday  -advanced- hands contracted     12. Gerd   -cont pantoprazole 40mg po bid       This is a 35 minute visit and greater than 50% of the time was spent counseling the patient and/or coordinating care.    Nadiya Wilkes, APRN  02/19/24   3:21 PM

## 2024-02-20 PROCEDURE — 99306 1ST NF CARE HIGH MDM 50: CPT | Performed by: INTERNAL MEDICINE

## 2024-02-20 NOTE — PAYOR COMM NOTE
--------------  DISCHARGE REVIEW    Payor: [employer]  Subscriber #:  [id number]  Authorization Number: [id number]    Admit date: 2/3/24  Admit time:  12:22 AM  Discharge Date: 2024  2:20 PM     Admitting Physician: Rosalinda Barry DO  Attending Physician:  No att. providers found  Primary Care Physician: Johnathon Rodriguez DO          Discharge Summary Notes        Discharge Summary signed by Raiza Alvarez MD at 2024 12:39 PM       Author: Raiza Alvarez MD Specialty: HOSPITALIST Author Type: Physician    Filed: 2024 12:39 PM Date of Service: 2024 12:36 PM Status: Signed    : Raiza Alvarez MD (Physician)         [facility]    Discharge Summary    Margaret Silverio Patient Status:  Inpatient    3/14/1945 MRN [id number]   Location [facility] 3W/ Attending Raiza Alvarez MD   Hosp Day # 15 PCP Johnathon Rodriguez DO     Date of Admission: 2024   Date of Discharge: 2024    Hospital Discharge Diagnoses: Acute on chronic Systolic CHF    Lace+ Score: 82  59-90 High Risk  29-58 Medium Risk  0-28   Low Risk.    TCM Follow-Up Recommendation:  LACE > 58: High Risk of readmission after discharge from the hospital.        Admitting Diagnosis: Acute on chronic congestive heart failure, unspecified heart failure type (HCC) [I50.9]    Disposition: Home    Discharge Diagnosis: .Principal Problem:    Acute on chronic congestive heart failure, unspecified heart failure type (HCC)  Active Problems:    Pancytopenia (HCC)    Goals of care, counseling/discussion    Advance care planning    Palliative care by specialist    Acute deep vein thrombosis (DVT) of left upper extremity (HCC)    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HCC)    Cardiogenic shock (HCC)    HFrEF (heart failure with reduced ejection fraction) (HCC)    Anemia      Hospital Course:   Reason for Admission:     Discharge Physical Exam:   Physical Exam:    General: No acute distress.   Respiratory:  Clear to auscultation bilaterally. No wheezes. No rhonchi.  Cardiovascular: S1, S2. Regular rate and rhythm. No murmurs, rubs or gallops.   Abdomen: Soft, nontender, nondistended.  Positive bowel sounds. No rebound or guarding.  Neurologic: No focal neurological deficits.   Musculoskeletal: Moves all extremities.    Hospital Course:     Acute respiratory failure 2/2 cardiogenic shock   Repeat CXR with CHF.   On IV bumex BID- bumex drip discontinued   Apprec pulm and cards consult   Repeat echo with EF 15%, severe TR  Blood cxs sent - ntd. Antibiotics discontinued   Dobutamine discontinued  Milrinone weaned off today   Strict I/o and daily wts   Monitor creatinine closely which is rising. Renal consulted - apprec input   PAP therapy prn  Change IV bumex to PO 1 mg BID  Coreg 3.125 BID   Continue on Entresto  Discharge to [facility] today               2. Pancytopenia/ worsening severe acute TCP:  -had similar issue on last admission Dr Snyder was consulted; thought to be consumptive in seeing of acute illness vs drug induced at the time. No h/o heparin use   - apprec Hem input - continue on prednisone   - haptoglobin decreased and ldh elevated   - Had one unit of blood 2/12   - supportive transfusion for platelets      3. Paroxysmal atrial fibrillation   Stop oral anticoagulation given recent GI bleed  Currently rate controlled  Still considering outpatient Watchman procedure given her recurrent GI bleeds     4. Chronic kidney injury stage III  - creat stable ; 200 out   - strict I/o's   - daily wts       5. Subclavian clot on icd lead  - platelets are better- not a candidate for AC due to history of GI bleed     6 HTN  -Continue on hydralazine   - Isordil increased to 10 mg PO TID        DVT ppx scds      Discussed with pt's daughter Barbi with [facility] Palliative care. She understands her heart fx is much worse. Discussed with Dr Lr.   Patient is DNR/Select.         Raiza Alvarez MD    Complications:  none    Consultants         Provider   Role Specialty     Alfred Crane MD  Consulting Physician Hematology and Oncology     Will Gonzalez MD  Consulting Physician NEPHROLOGY     Aroldo Pacheco MD  Consulting Physician PULMONARY DISEASES     Briana Snyder MD  Consulting Physician Hematology and Oncology     Kayla Wood APRN  Consulting Physician Nurse Practitioner                Discharge Plan:   Discharge Condition: Stable    Current Discharge Medication List        New Orders    Details   SACUBITRIL-VALSARTAN 49-51 MG Oral Tab Take 1 tablet by mouth 2 (two) times daily.      BUMETANIDE 1 MG Oral Tab Take 1 tablet (1 mg total) by mouth BID (Diuretic).      HYDRALAZINE 25 MG Oral Tab Take 1 tablet (25 mg total) by mouth every 8 (eight) hours.      ISOSORBIDE DINITRATE 10 MG Oral Tab Take 1 tablet (10 mg total) by mouth TID (Nitrates).      SPIRONOLACTONE 25 MG Oral Tab Take 1 tablet (25 mg total) by mouth daily.      senna-docusate 8.6-50 MG Oral Tab Take 2 tablets by mouth daily.           Home Meds - Modified    Details   CARVEDILOL 3.125 MG Oral Tab Take 1 tablet (3.125 mg total) by mouth 2 (two) times daily with meals.      HYDROcodone-acetaminophen (NORCO)  MG Oral Tab Take 1 tablet by mouth every 6 (six) hours as needed for Pain.           Home Meds - Unchanged    Details   amiodarone 200 MG Oral Tab Take 1 tablet (200 mg total) by mouth daily.      AMITRIPTYLINE 25 MG Oral Tab TAKE 1 TABLET(25 MG) BY MOUTH EVERY NIGHT      pantoprazole 40 MG Oral Tab EC Take 1 tablet (40 mg total) by mouth 2 (two) times daily before meals.      ferrous sulfate 325 (65 FE) MG Oral Tab EC Take 1 tablet (325 mg total) by mouth daily with breakfast.      folic acid 800 MCG Oral Tab Take 1 tablet (800 mcg total) by mouth daily.      methotrexate 2.5 MG Oral Tab TAKE 6 TABLETS BY MOUTH 1 TIME A WEEK      alendronate 70 MG Oral Tab Take 1 tablet (70 mg total) by mouth once a week.                 Discharge  Diet: Low Sodium Diet     Discharge Activity: As tolerated       Discharge Medications        START taking these medications        Instructions Prescription details   bumetanide 1 MG Tabs  Commonly known as: Bumex      Take 1 tablet (1 mg total) by mouth BID (Diuretic).   Quantity: 60 tablet  Refills: 1     carvedilol 3.125 MG Tabs  Commonly known as: Coreg  Replaces: carvedilol 25 MG Tabs      Take 1 tablet (3.125 mg total) by mouth 2 (two) times daily with meals.   Quantity: 60 tablet  Refills: 1     hydrALAZINE 25 MG Tabs  Commonly known as: Apresoline      Take 1 tablet (25 mg total) by mouth every 8 (eight) hours.   Quantity: 90 tablet  Refills: 1     isosorbide dinitrate 10 MG Tabs  Commonly known as: Isordil      Take 1 tablet (10 mg total) by mouth TID (Nitrates).   Quantity: 90 tablet  Refills: 1     sacubitril-valsartan 49-51 MG Tabs  Commonly known as: Entresto  Replaces: Entresto 24-26 MG Tabs      Take 1 tablet by mouth 2 (two) times daily.   Quantity: 60 tablet  Refills: 1     senna-docusate 8.6-50 MG Tabs  Commonly known as: Senokot-S      Take 2 tablets by mouth daily.   Quantity: 60 tablet  Refills: 0     spironolactone 25 MG Tabs  Commonly known as: Aldactone      Take 1 tablet (25 mg total) by mouth daily.   Quantity: 30 tablet  Refills: 1            CONTINUE taking these medications        Instructions Prescription details   alendronate 70 MG Tabs  Commonly known as: Fosamax      Take 1 tablet (70 mg total) by mouth once a week.   Quantity: 12 tablet  Refills: 0     amiodarone 200 MG Tabs  Commonly known as: Pacerone      Take 1 tablet (200 mg total) by mouth daily.   Refills: 0     amitriptyline 25 MG Tabs  Commonly known as: Elavil      TAKE 1 TABLET(25 MG) BY MOUTH EVERY NIGHT   Quantity: 30 tablet  Refills: 0     ferrous sulfate 325 (65 FE) MG Tbec      Take 1 tablet (325 mg total) by mouth daily with breakfast.   Refills: 0     folic acid 800 MCG Tabs  Commonly known as: FOLVITE      Take 1  tablet (800 mcg total) by mouth daily.   Quantity: 90 tablet  Refills: 0     HYDROcodone-acetaminophen  MG Tabs  Commonly known as: Norco      Take 1 tablet by mouth every 6 (six) hours as needed for Pain.   Quantity: 60 tablet  Refills: 0     methotrexate 2.5 MG Tabs  Commonly known as: Rheumatrex      TAKE 6 TABLETS BY MOUTH 1 TIME A WEEK   Quantity: 77 tablet  Refills: 0     pantoprazole 40 MG Tbec  Commonly known as: Protonix      Take 1 tablet (40 mg total) by mouth 2 (two) times daily before meals.   Quantity: 60 tablet  Refills: 0            STOP taking these medications      carvedilol 25 MG Tabs  Commonly known as: Coreg  Replaced by: carvedilol 3.125 MG Tabs        Entresto 24-26 MG Tabs  Generic drug: sacubitril-valsartan  Replaced by: sacubitril-valsartan 49-51 MG Tabs        furosemide 40 MG Tabs  Commonly known as: Lasix                  Where to Get Your Medications        These medications were sent to [id number] [place] [address] AT [facility] [address], [phone], [phone]  [address], [facility] [address]      Phone: [phone]   folic acid 800 MCG Tabs       Please  your prescriptions at the location directed by your doctor or nurse    Bring a paper prescription for each of these medications  bumetanide 1 MG Tabs  carvedilol 3.125 MG Tabs  hydrALAZINE 25 MG Tabs  HYDROcodone-acetaminophen  MG Tabs  isosorbide dinitrate 10 MG Tabs  sacubitril-valsartan 49-51 MG Tabs  senna-docusate 8.6-50 MG Tabs  spironolactone 25 MG Tabs         Follow up:      Follow-up Information       Luis Sargent MD Follow up in 1 week(s).    Specialties: Cardiac Electrophysiology, CARDIOLOGY  Why: Office will call you for follow up appt  Contact information:  [address]  [facility] [address]  [facility] [address] [phone]               Johnathon Rodriguez, DO Follow up in 1 week(s).    Specialty: Family Medicine  Contact information:  [address] [place] [address]  [address]  [facility] [address]  [phone]                             Follow up Labs and imaging:         Other Discharge Instructions:         Residential palliative care will follow on discharge to rehab.         Time spent:  > 30 minutes    RAIZA COMBS MD  2/18/2024      Electronically signed by Raiza Combs MD on 2/18/2024 12:39 PM         REVIEWER COMMENTS

## 2024-02-22 ENCOUNTER — EXTERNAL FACILITY (OUTPATIENT)
Dept: INTERNAL MEDICINE CLINIC | Facility: CLINIC | Age: 79
End: 2024-02-22

## 2024-02-22 DIAGNOSIS — R57.0 CARDIOGENIC SHOCK (HCC): Primary | ICD-10-CM

## 2024-02-22 DIAGNOSIS — I10 PRIMARY HYPERTENSION: ICD-10-CM

## 2024-02-22 DIAGNOSIS — I48.0 PAROXYSMAL ATRIAL FIBRILLATION (HCC): ICD-10-CM

## 2024-02-22 NOTE — PROGRESS NOTES
Seen 2/20/2024     78 year old female  was admitted for GI bleed and found to have AVMs.  She was discharged off of oral anticoagulation  she  came   back n 2/2/24 with increased weakness and shortness of breath. In the emergency room she was found to have an elevated BNP   She was admitted for further work up .  diagnosed with immune thrombocytopenia, treated for acute respiratory failure 2/2 to  cardiogenic shock  Patient was stabilized and transferred to [facility] for continued monitoring and conditioning .        Past Medical History:   Anemia   Arrhythmia   Arthritis   Deep vein thrombosis (HCC)   Essential hypertension   High blood pressure   History of blood transfusion   Seizure disorder (HCC)   Seizures (HCC)   COLONOSCOPY N/A 01/17/2024   Dr. Rios   COLONOSCOPY N/A 1/17/2024   Procedure: COLONOSCOPY; Surgeon: Zoraida Rios MD; Location: [facility] ENDOSCOPY   EGD N/A 01/17/2024   Dr. Rios   OTHER SURGICAL HISTORY 2017   Heart Surgery   OTHER SURGICAL HISTORY 2020   Bilateral shoulder replacement        Socioeconomic History   Marital status:   Tobacco Use   Smoking status: Former   Packs/day: .25   Types: Cigarettes   Quit date: 2014   Years since quitting: 10.1   Smokeless tobacco: Never  Vaping Use   Vaping Use: Never used  Substance and Sexual Activity   Alcohol use: Never   Drug use: Never      CURRENT MEDICATIONS - reviewed     REVIEW OF SYSTEMS:  she is   complaining  of neck pain, feels  tired    PHYSICAL EXAM:  GENERAL HEALTH: thin , fragile ,   SKIN: no rashes, no suspicious lesions    EYES: PERRLA, EOMI, sclera anicteric, conjunctiva normal; there is no nystagmus, no drainage from eyes  HENT: normocephalic; normal nose, no nasal drainage, mucous membranes pink, moist, pharynx no exudate, no visible cerumen.  NECK: supple; FROM; no JVD, no TMG, no carotid bruits, obvious pacemaker noted on chest  BREAST: deferred  RESPIRATORY:clear to percussion and auscultation  CARDIOVASCULAR: S1, S2  normal, RRR; no S3, no S4;  ABDOMEN: normal active BS+, soft, nondistended;  PSYCHIATRIC:  oriented x 3,     a/p     echo with EF 15%, severe TR, treated  with  dobutamin,milrinon  -Changed IV bumex to PO 1 mg BID  -cont Coreg 3.125 BID   -Continue on Entresto 24-26 mg po bid  -cont amiodarone 200mg po daily       2. Pancytopenia/ worsening severe acute TCP:  seen by hematology  thought to be consumptive in seeing of acute illness vs drug induced at the time.   continue on prednisone  - haptoglobin decreased and ldh elevated  - Had one unit of blood 2/12  - supportive transfusion for platelets  cbc      3. Paroxysmal atrial fibrillation   off  ax  considering outpatient Watchman procedure given her recurrent GI bleeds     4. Chronic kidney injury stage III  monitor bmp      5. Subclavian clot on icd lead  - not a candidate for AC due to history of GI bleed     6 HTN  monitor          7. Depression  -cont amitriptyline 25mg po q hs      8. Inflammation/arthritis  -cont methotrexate 15mg po every Tuesday      ptot

## 2024-02-26 ENCOUNTER — SNF VISIT (OUTPATIENT)
Dept: INTERNAL MEDICINE CLINIC | Facility: SKILLED NURSING FACILITY | Age: 79
End: 2024-02-26

## 2024-02-26 ENCOUNTER — EXTERNAL FACILITY (OUTPATIENT)
Dept: INTERNAL MEDICINE CLINIC | Facility: CLINIC | Age: 79
End: 2024-02-26

## 2024-02-26 VITALS
WEIGHT: 137 LBS | SYSTOLIC BLOOD PRESSURE: 122 MMHG | OXYGEN SATURATION: 97 % | TEMPERATURE: 98 F | HEART RATE: 66 BPM | RESPIRATION RATE: 16 BRPM | DIASTOLIC BLOOD PRESSURE: 55 MMHG | BODY MASS INDEX: 23 KG/M2

## 2024-02-26 DIAGNOSIS — N18.30 STAGE 3 CHRONIC KIDNEY DISEASE, UNSPECIFIED WHETHER STAGE 3A OR 3B CKD (HCC): ICD-10-CM

## 2024-02-26 DIAGNOSIS — I50.20 SYSTOLIC CONGESTIVE HEART FAILURE, UNSPECIFIED HF CHRONICITY (HCC): Primary | ICD-10-CM

## 2024-02-26 DIAGNOSIS — M06.9 RHEUMATOID ARTHRITIS, INVOLVING UNSPECIFIED SITE, UNSPECIFIED WHETHER RHEUMATOID FACTOR PRESENT (HCC): ICD-10-CM

## 2024-02-26 DIAGNOSIS — D61.818 PANCYTOPENIA (HCC): ICD-10-CM

## 2024-02-26 DIAGNOSIS — Z02.9 DISCHARGE PLANNING ISSUES: Primary | ICD-10-CM

## 2024-02-26 DIAGNOSIS — I48.91 ATRIAL FIBRILLATION, UNSPECIFIED TYPE (HCC): ICD-10-CM

## 2024-02-26 DIAGNOSIS — I48.0 PAROXYSMAL ATRIAL FIBRILLATION (HCC): ICD-10-CM

## 2024-02-26 DIAGNOSIS — I10 PRIMARY HYPERTENSION: ICD-10-CM

## 2024-02-26 NOTE — PROGRESS NOTES
Margaret Silverio  : 3/14/1945  Age 78 year old  female patient is admitted to   [facility] 24 for rehab and strengthening      Chief complaint: Acute on chronic Systolic CHF      [facility] Admission :24 to 24      HPI   78 year old female with well-known to [facility]  from multiple previous admissions she was just discharged on .  She had been admitted for GI bleed and found to have AVMs.  She was discharged off of oral anticoagulation and has a history of atrial fibrillation.  She returned to [facility] ED 24  with increased weakness and shortness of breath. In the emergency room she was found to have an elevated BNP of 18,762.  She had a normal white count hemoglobin of 9.5 platelet count of 184.  Blood sugar was low at 55, creatinine was 1.47 which appears to be baseline with a GFR of 37 troponin was negative.  She was treated with Lasix 40 mg IV.  She was admitted for further work up .  24 had pancytopenia . On 24 diagnosed with  acute DVT of the left upper extremity .   Palliative care was consulted 24 with advanced care planning done. 24 diagnosed with immune thrombocytopenia.  24 treated for  acute respiratory failure  to  cardiogenic shock  was in ICU,   Followed closely by Cards, pulm and Renal in hospital.    Patient was stabilized and transferred to [facility] for continued monitoring and conditioning .      Patient seen in follow up , vss no reported fevers or chills.  Patient was examined while in bed , she appears alert and oriented x 3, very talkative, completely a change from last week. She reports did not have a good week last week but better and trying to participate in therapies.  No lower leg edema noted. HR regular. Lungs clear.  Denies changes in bowels or bladder.  Denies pain. No other new issues or concerns. She did see Palliative care in Hospital, again discussed if Palliative to follow her in rehab, she wasn't sure she wanted  it, which she would greatly benefit from. Appetite improving , reports is drinking fluids and eating better .  Overall better than last week.  CBC and  BMP due in am .     ALLERGIES:  Allergies   Allergen Reactions    Lisinopril Coughing       CODE STATUS:  DNAR    ADVANCED CARE PLANNING TEAM: will need family care plan    CURRENT MEDICATIONS - reviewed and updated     Current Outpatient Medications   Medication Sig Dispense Refill    HYDROcodone-acetaminophen (NORCO)  MG Oral Tab Take 1 tablet by mouth every 6 (six) hours as needed for Pain. 60 tablet 0    CARVEDILOL 3.125 MG Oral Tab Take 1 tablet (3.125 mg total) by mouth 2 (two) times daily with meals. 60 tablet 1    SACUBITRIL-VALSARTAN 49-51 MG Oral Tab Take 1 tablet by mouth 2 (two) times daily. 60 tablet 1    BUMETANIDE 1 MG Oral Tab Take 1 tablet (1 mg total) by mouth BID (Diuretic). 60 tablet 1    HYDRALAZINE 25 MG Oral Tab Take 1 tablet (25 mg total) by mouth every 8 (eight) hours. 90 tablet 1    ISOSORBIDE DINITRATE 10 MG Oral Tab Take 1 tablet (10 mg total) by mouth TID (Nitrates). 90 tablet 1    SPIRONOLACTONE 25 MG Oral Tab Take 1 tablet (25 mg total) by mouth daily. 30 tablet 1    senna-docusate 8.6-50 MG Oral Tab Take 2 tablets by mouth daily. 60 tablet 0    folic acid 800 MCG Oral Tab Take 1 tablet (800 mcg total) by mouth daily. 90 tablet 0    amiodarone 200 MG Oral Tab Take 1 tablet (200 mg total) by mouth daily.      AMITRIPTYLINE 25 MG Oral Tab TAKE 1 TABLET(25 MG) BY MOUTH EVERY NIGHT 30 tablet 0    pantoprazole 40 MG Oral Tab EC Take 1 tablet (40 mg total) by mouth 2 (two) times daily before meals. 60 tablet 0    methotrexate 2.5 MG Oral Tab TAKE 6 TABLETS BY MOUTH 1 TIME A WEEK 77 tablet 0    alendronate 70 MG Oral Tab Take 1 tablet (70 mg total) by mouth once a week. 12 tablet 0    ferrous sulfate 325 (65 FE) MG Oral Tab EC Take 1 tablet (325 mg total) by mouth daily with breakfast.         VITALS:  /55   Pulse 66   Temp 97.5  °F (36.4 °C)   Resp 16   Wt 137 lb (62.1 kg)   SpO2 97%   BMI 22.80 kg/m²       REVIEW OF SYSTEMS:  79 y/o very fragile, thin and ill appearing female. Cooperative on exam and  very  talkative this morning ,reports feeling overall better , appetite improved, drinking fluids well .   Labs are due in am . Reports trying to participate in therapy in room, overall she is weak but improved.  Delayed therapies a bit today due to headache in am which has since resolved with meds.  No other new issues or concerns.       PHYSICAL EXAM:  GENERAL HEALTH: thin , fragile  female, cooperate and alert and talkative , reports having therapy later due to small headache that has resolved    LINES, TUBES, DRAINS:  none  SKIN: no rashes, no suspicious lesions  WOUND: nine  EYES: PERRLA, EOMI, sclera anicteric, conjunctiva normal; there is no nystagmus, no drainage from eyes  HENT: normocephalic; normal nose, no nasal drainage, mucous membranes pink, moist, pharynx no exudate, no visible cerumen.  NECK: supple; FROM; no JVD, no TMG, no carotid bruits, obvious pacemaker noted on chest   BREAST: deferred  RESPIRATORY:clear to percussion and auscultation  CARDIOVASCULAR: S1, S2 normal, RRR; no S3, no S4;   ABDOMEN:  normal active BS+, soft, nondistended; no organomegaly, no masses; no bruits; nontender, no guarding, no rebound tenderness.  :no suprapubic distension  LYMPHATIC:no lymphedema  MUSCULOSKELETAL: generalized weakness, unclear if she walks - very weak   EXTREMITIES/VASCULAR:no cyanosis, clubbing or edema  NEUROLOGIC: follows commands  PSYCHIATRIC: lethargic, oriented x 3, does not want to participate today, reporting just not feeling well overall         SEE PLAN BELOW  Acute respiratory failure 2/2 cardiogenic shock   -Repeat CXR with CHF.   -was on IV bumex BID- bumex drip discontinued in hospital   - pulm and cards followed in hospital   -Pulm and Cards to follow in rehab    - Repeat echo with EF 15%, severe TR  - Blood  cxs sent - ntd. Antibiotics discontinued in hospital  -Dobutamine discontinued in hospital   - Milrinone weaned off  2/18/24  - daily wts   -Monitor creatinine closely which had been  rising.   -Renal followed in hospital   - PAP therapy prn when in hospital   -Changed IV bumex to PO 1 mg BID in rehab   -cont Coreg 3.125 BID   -Continue on Entresto 24-26 mg po bid   -cont amiodarone 200mg po daily   -monitor      2. Pancytopenia/ worsening severe acute TCP:  -had similar issue on last admission Dr Snyder was consulted; thought to be consumptive in seeing of acute illness vs drug induced at the time.   -No h/o heparin use   - Hem following in hospital  - continue on prednisone   - haptoglobin decreased and ldh elevated   - Had one unit of blood 2/12   - supportive transfusion for platelets   -weekly cbc and bmp in rehab and prn, due in am        3. Paroxysmal atrial fibrillation   -Stop oral anticoagulation given recent GI bleed  -Currently rate controlled  -Still considering outpatient Watchman procedure given her recurrent GI bleeds  -Cards to follow in rehab     4. Chronic kidney injury stage III  - creat stable ; 200 out   - strict I/o's   - daily wts   -weekly cbc and bmp in rehab      5. Subclavian clot on icd lead  - platelets are better  - not a candidate for AC due to history of GI bleed     6 HTN  -vs q shift -controlled   -Continue on hydralazine 25mg po q 8 hrs   - Isordil increased to 10 mg PO TID  -monitor      7. Edema  -cont spironolactone 25mg po daily   -cont bumex 1 mg po bid      8. Pain control  -cont norco 10mg /325mg po q 6 prn      9. Depression   -cont amitriptyline 25mg po q hs   -possible failure to thrive but overall much improved today   -Psych to see if she agrees in rehab  -would also benefit from palliative care but unsure currently      10. Vitamins   -cont mvi po daily   -cont ascorbic acid 500mg po q day   -cont iron tabs po daily   -cont alendronate sodium 70mg q weds   -cont  folic acid 800 mcg po daily   -cont zinc 220mg po daily x 14 days      11. Inflammation /RA  -cont methotrexate 15mg po every Tuesday  -advanced- hands contracted      12. Gerd   -cont pantoprazole 40mg po bid       13. Discharge plan   -paln is to transfer closer to family at [facility]   -possible dc date 3/7/24     This is a 35 minute visit and greater than 50% of the time was spent counseling the patient and/or coordinating care.    Nadiya Wilkes, KODI  02/26/24   10:23 AM

## 2024-02-26 NOTE — PROGRESS NOTES
seen 2/22/2024    Past Medical History:   Anemia   Arrhythmia   Arthritis   Deep vein thrombosis (HCC)   Essential hypertension   High blood pressure   History of blood transfusion   Seizure disorder (HCC)   Seizures (HCC)   COLONOSCOPY N/A 01/17/2024   Dr. Rios   COLONOSCOPY N/A 1/17/2024   Procedure: COLONOSCOPY; Surgeon: Zoraida Rios MD; Location: [facility] ENDOSCOPY   EGD N/A 01/17/2024   Dr. Rios   OTHER SURGICAL HISTORY 2017   Heart Surgery   OTHER SURGICAL HISTORY 2020   Bilateral shoulder replacement    Socioeconomic History   Marital status:   Tobacco Use   Smoking status: Former   Packs/day: .25   Types: Cigarettes   Quit date: 2014   Years since quitting: 10.1   Smokeless tobacco: Never  Vaping Use   Vaping Use: Never used  Substance and Sexual Activity   Alcohol use: Never   Drug use: Never    CURRENT MEDICATIONS - reviewed    REVIEW OF SYSTEMS:  she is  very tired,her neck  hurts    PHYSICAL EXAM:  GENERAL HEALTH: thin , fragile ,  SKIN: no rashes, no suspicious lesions    EYES: PERRLA, EOMI, sclera anicteric, conjunctiva normal; there is no nystagmus, no drainage from eyes  HENT: normocephalic; normal nose, no nasal drainage, mucous membranes pink, moist, pharynx no exudate, no visible cerumen.  NECK: supple; FROM; no JVD, no TMG, no carotid bruits, obvious pacemaker noted on chest  BREAST: deferred  RESPIRATORY:clear to percussion and auscultation  CARDIOVASCULAR: S1, S2 normal, RRR; no S3, no S4;  ABDOMEN: normal active BS+, soft, nondistended;  PSYCHIATRIC: oriented x 3,    a/p  PLAN:  chf-continue with  current  medications,stable,cardio in house to see,continue to monitor cbc ,ptot      echo with EF 15%, severe TR, treated with dobutamin,milrinon  -Changed IV bumex to PO 1 mg BID  -cont Coreg 3.125 BID   -Continue on Entresto 24-26 mg po bid  -cont amiodarone 200mg po daily       2. Pancytopenia/ worsening severe acute TCP:  seen by hematology thought to be consumptive in seeing of acute illness  vs drug induced at the time.   continue on prednisone  - haptoglobin decreased and ldh elevated  - Had one unit of blood 2/12  - supportive transfusion for platelets  cbc     3. Paroxysmal atrial fibrillation   off ax  considering outpatient Watchman procedure given her recurrent GI bleeds     4. Chronic kidney injury stage III  monitor bmp      5. Subclavian clot on icd lead  - not a candidate for AC due to history of GI bleed     6 HTN  monitor    7. Depression  -cont amitriptyline 25mg po q hs    8. Inflammation/arthritis  -cont methotrexate 15mg po every Tuesday    ptot

## 2024-02-27 ENCOUNTER — EXTERNAL FACILITY (OUTPATIENT)
Dept: INTERNAL MEDICINE CLINIC | Facility: CLINIC | Age: 79
End: 2024-02-27

## 2024-02-27 DIAGNOSIS — I50.20 SYSTOLIC CONGESTIVE HEART FAILURE, UNSPECIFIED HF CHRONICITY (HCC): Primary | ICD-10-CM

## 2024-02-27 DIAGNOSIS — I10 PRIMARY HYPERTENSION: ICD-10-CM

## 2024-02-27 PROCEDURE — 99308 SBSQ NF CARE LOW MDM 20: CPT | Performed by: INTERNAL MEDICINE

## 2024-02-27 PROCEDURE — 1111F DSCHRG MED/CURRENT MED MERGE: CPT | Performed by: INTERNAL MEDICINE

## 2024-02-27 NOTE — PROGRESS NOTES
follow  up    Past Medical History:   Anemia   Arrhythmia   Arthritis   Deep vein thrombosis (HCC)   Essential hypertension   High blood pressure   History of blood transfusion   Seizure disorder (HCC)   Seizures (HCC)   COLONOSCOPY N/A 01/17/2024   Dr. Rios   COLONOSCOPY N/A 1/17/2024   Procedure: COLONOSCOPY; Surgeon: Zoraida Rios MD; Location: [facility] [place] ENDOSCOPY   EGD N/A 01/17/2024   Dr. Rios   OTHER SURGICAL HISTORY 2017   Heart Surgery   OTHER SURGICAL HISTORY 2020   Bilateral shoulder replacement    Socioeconomic History   Marital status:   Tobacco Use   Smoking status: Former   Packs/day: .25   Types: Cigarettes   Quit date: 2014   Years since quitting: 10.1   Smokeless tobacco: Never  Vaping Use   Vaping Use: Never used  Substance and Sexual Activity   Alcohol use: Never   Drug use: Never    CURRENT MEDICATIONS - reviewed    REVIEW OF SYSTEMS:  she is feeling much better,has more energy     PHYSICAL EXAM:  GENERAL HEALTH: thin , fragile ,  SKIN: no rashes, no suspicious lesions    EYES: PERRLA, EOMI, sclera anicteric, conjunctiva normal; there is no nystagmus, no drainage from eyes  HENT: normocephalic; normal nose, no nasal drainage, mucous membranes pink, moist, pharynx no exudate, no visible cerumen.  NECK: supple; FROM; no JVD, no TMG, no carotid bruits, obvious pacemaker noted on chest  BREAST: deferred  RESPIRATORY:clear to percussion and auscultation  CARDIOVASCULAR: S1, S2 normal, RRR; no S3, no S4;  ABDOMEN: normal active BS+, soft, nondistended;  PSYCHIATRIC: oriented x 3,    a/p  PLAN: she is getting better, continue with ptot, chf   appears euvolemic-continue with current meds            chf-continue with current medications,stable,  echo with EF 15%, severe TR, treated with dobutamin,milrinon  -Changed IV bumex to PO 1 mg BID  -cont Coreg 3.125 BID   -Continue on Entresto 24-26 mg po bid  -cont amiodarone 200mg po daily       2. Pancytopenia/ worsening severe acute TCP:  seen by hematology  thought to be consumptive in seeing of acute illness vs drug induced at the time.   continue on prednisone  - haptoglobin decreased and ldh elevated  - Had one unit of blood 2/12  - supportive transfusion for platelets  cbc     3. Paroxysmal atrial fibrillation   off ax  considering outpatient Watchman procedure given her recurrent GI bleeds     4. Chronic kidney injury stage III  monitor bmp      5. Subclavian clot on icd lead  - not a candidate for AC due to history of GI bleed     6 HTN  monitor    7. Depression  -cont amitriptyline 25mg po q hs    8. Inflammation/arthritis  -cont methotrexate 15mg po every Tuesday    ptot

## 2024-02-28 NOTE — TELEPHONE ENCOUNTER
Upload forms to pt mychart once completed. Pt dtr aware we cannot faxout with elizabeth or upload to her own mychart. If mom can't remember login information for mychart, pt dtr will  forms in the office.

## 2024-02-28 NOTE — TELEPHONE ENCOUNTER
Type of Leave: intermittent  Reason for Leave:congestive heart failure, pt in rehab, RA- limited use of hands. Needs assistance with getting in and out of bed, transport to and from appts, assistance with all ADL's  Start date of leave: 2/28/24  How much time needed?: 1-4 flare ups per month lasting a full day, 1-2 days per week for appts lasting 1-4 hours   Forms Due Date:  Was Fee and Turnaround info Given?:

## 2024-02-28 NOTE — TELEPHONE ENCOUNTER
Dr. Rodriguez,    Patients daughter is seeking intermittent fmla to care for mom d/t multiple health diagnosis ie: heart failure, d/t her being in rehab, RA with limited use of hands, needing assistance with all basic adl's and transportation to and from appts. She is requesting 1-4 flare ups per month lasting a full day and 1-2 appts per week lasting 1-4 hours per appt. Do you support?    Thank you,    Christine GRESHAM

## 2024-02-29 ENCOUNTER — EXTERNAL FACILITY (OUTPATIENT)
Dept: INTERNAL MEDICINE CLINIC | Facility: CLINIC | Age: 79
End: 2024-02-29

## 2024-02-29 DIAGNOSIS — I10 PRIMARY HYPERTENSION: ICD-10-CM

## 2024-02-29 DIAGNOSIS — I50.20 SYSTOLIC CONGESTIVE HEART FAILURE, UNSPECIFIED HF CHRONICITY (HCC): Primary | ICD-10-CM

## 2024-02-29 PROCEDURE — 99308 SBSQ NF CARE LOW MDM 20: CPT | Performed by: INTERNAL MEDICINE

## 2024-02-29 PROCEDURE — 1111F DSCHRG MED/CURRENT MED MERGE: CPT | Performed by: INTERNAL MEDICINE

## 2024-02-29 NOTE — PROGRESS NOTES
follow up    Past Medical History:   Anemia   Arrhythmia   Arthritis   Deep vein thrombosis (HCC)   Essential hypertension   High blood pressure   History of blood transfusion   Seizure disorder (HCC)   Seizures (HCC)   COLONOSCOPY N/A 01/17/2024   Dr. Rios   COLONOSCOPY N/A 1/17/2024   Procedure: COLONOSCOPY; Surgeon: Zoraida Rios MD; Location: [facility] ENDOSCOPY   EGD N/A 01/17/2024   Dr. Rios   OTHER SURGICAL HISTORY 2017   Heart Surgery   OTHER SURGICAL HISTORY 2020   Bilateral shoulder replacement    Socioeconomic History   Marital status:   Tobacco Use   Smoking status: Former   Packs/day: .25   Types: Cigarettes   Quit date: 2014   Years since quitting: 10.1   Smokeless tobacco: Never  Vaping Use   Vaping Use: Never used  Substance and Sexual Activity   Alcohol use: Never   Drug use: Never    CURRENT MEDICATIONS - reviewed    REVIEW OF SYSTEMS:  she has no complains,feels  better ,eating breakfast    PHYSICAL EXAM:  GENERAL HEALTH: thin , fragile ,  SKIN: no rashes, no suspicious lesions    EYES: PERRLA, EOMI, sclera anicteric, conjunctiva normal; there is no nystagmus, no drainage from eyes  HENT: normocephalic; normal nose, no nasal drainage, mucous membranes pink, moist, pharynx no exudate, no visible cerumen.  NECK: supple; FROM; no JVD, no TMG, no carotid bruits, obvious pacemaker noted on chest  BREAST: deferred  RESPIRATORY:clear to percussion and auscultation  CARDIOVASCULAR: S1, S2 normal, RRR; no S3, no S4;  ABDOMEN: normal active BS+, soft, nondistended;  PSYCHIATRIC: oriented x 3,    a/p    PLAN:continue  to  monitor input /output ,will continue with  diuretics,will  continue to monitor bmp                   chf-continue with current medications,stable,  echo with EF 15%, severe TR, treated with dobutamin,milrinon  -Changed IV bumex to PO 1 mg BID  -cont Coreg 3.125 BID   -Continue on Entresto 24-26 mg po bid  -cont amiodarone 200mg po daily       2. Pancytopenia/ worsening severe acute  TCP:  seen by hematology thought to be consumptive in seeing of acute illness vs drug induced at the time.   continue on prednisone  - haptoglobin decreased and ldh elevated  - Had one unit of blood 2/12  - supportive transfusion for platelets  cbc     3. Paroxysmal atrial fibrillation   off ax  considering outpatient Watchman procedure given her recurrent GI bleeds     4. Chronic kidney injury stage III  monitor bmp      5. Subclavian clot on icd lead  - not a candidate for AC due to history of GI bleed     6 HTN  monitor    7. Depression  -cont amitriptyline 25mg po q hs    8. Inflammation/arthritis  -cont methotrexate 15mg po every Tuesday    ptot

## 2024-03-01 ENCOUNTER — SNF DISCHARGE (OUTPATIENT)
Dept: INTERNAL MEDICINE CLINIC | Facility: SKILLED NURSING FACILITY | Age: 79
End: 2024-03-01

## 2024-03-01 DIAGNOSIS — N17.9 AKI (ACUTE KIDNEY INJURY) (HCC): ICD-10-CM

## 2024-03-01 DIAGNOSIS — I48.20 ATRIAL FIBRILLATION, CHRONIC (HCC): Primary | ICD-10-CM

## 2024-03-01 DIAGNOSIS — M06.00 RHEUMATOID ARTHRITIS WITH NEGATIVE RHEUMATOID FACTOR, INVOLVING UNSPECIFIED SITE (HCC): ICD-10-CM

## 2024-03-01 DIAGNOSIS — I10 ESSENTIAL (PRIMARY) HYPERTENSION: ICD-10-CM

## 2024-03-01 DIAGNOSIS — F32.A DEPRESSION, UNSPECIFIED DEPRESSION TYPE: ICD-10-CM

## 2024-03-01 DIAGNOSIS — J96.21 ACUTE ON CHRONIC RESPIRATORY FAILURE WITH HYPOXIA (HCC): ICD-10-CM

## 2024-03-01 NOTE — PROGRESS NOTES
Margaret Silverio, 3/14/1945, 78 year old, female is being discharged from [facility] to [place].      DISCHARGE SUMMARY    Date of Admission to [facility]: 2/18/24    Date of Discharge from [facility]: 3/1/2024                            Admitting Diagnoses: Acute on Chronic Systolic CHF    Reason for Admission to [facility]: Rehabilitation and strengthening      PHYSICAL EXAM:  GENERAL HEALTH: thin and frail elderly appearing female  LINES, TUBES, DRAINS:  none  SKIN: no rashes, no suspicious lesions  WOUND: see wound assessment,   EYES: PERRLA, EOMI, sclera anicteric, conjunctiva normal; there is no nystagmus, no drainage from eyes  HENT: normocephalic; normal nose, no nasal drainage, mucous membranes pink, moist, pharynx no exudate, no visible cerumen.   NECK:supple, FROM; no JVD, no TMG, no carotid bruits   BREAST: deferred exam   RESPIRATORY:clear to percussion and auscultation  CARDIOVASCULAR: S1, S2 normal, RRR; no S3, no S4  ABDOMEN:  normal active BS+, soft, nondistended; no organomegaly, no masses; no bruits; nontender, no guarding, no rebound tenderness.  :Deferred  LYMPHATIC:no lymphedema  MUSCULOSKELETAL: no acute synovitis upper or lower extremity   EXTREMITIES/VASCULAR:no cyanosis, clubbing or edema  NEUROLOGIC:intact; no sensorimotor deficit and follows commands  PSYCHIATRIC: alert and oriented x 3; affect appropriate     CURRENT MANAGEMENT AT [facility]  Acute respiratory failure 2/2 cardiogenic shock   - Repeat CXR with CHF.   - was on IV bumex BID- bumex drip discontinued in hospital   - pulm and cards followed in hospital   - Pulm and Cards to follow in rehab    - Repeat echo with EF 15%, severe TR  - Blood cxs sent - ntd. Antibiotics discontinued in hospital  - Dobutamine discontinued in hospital   - Milrinone weaned off  2/18/24  - daily wts   - Monitor creatinine closely which had been rising.   - Renal followed in hospital   - PAP therapy prn when in hospital   - Changed IV  bumex to PO 1 mg BID in rehab   - cont Coreg 3.125 BID   - Continue on Entresto 24-26 mg poAKI   bid   - cont amiodarone 200mg po daily   - monitor      Pancytopenia/ worsening severe acute TCP:  - had similar issue on last admission Dr Snyder was consulted; thought to be consumptive in seeing of acute illness vs drug induced at the time.   - No h/o heparin use   - Hem following in hospital  - continue on prednisone   - haptoglobin decreased and ldh elevated   - Had one unit of blood 2/12   - supportive transfusion for platelets   - weekly cbc and bmp in rehab and prn, due in am        Paroxysmal atrial fibrillation   - Stop oral anticoagulation given recent GI bleed  - Currently rate controlled  - Still considering outpatient Watchman procedure given her recurrent GI bleeds  - Cards to follow in rehab     Chronic kidney injury stage III  - creat stable ; 200 out   - strict I/o's   - daily wts   - weekly cbc and bmp in rehab     Subclavian clot on icd lead  - platelets are better  - not a candidate for AC due to history of GI bleed     HTN  - vs q shift -controlled   - Continue on hydralazine 25mg po q 8 hrs   - Isordil increased to 10 mg PO TID  - monitor      Edema  - cont spironolactone 25mg po daily   - cont bumex 1 mg po bid      Pain control  - cont norco 10mg /325mg po q 6 prn      Depression   - cont amitriptyline 25mg po q hs   - possible failure to thrive but overall much improved today   - Psych to see if she agrees in rehab  - would also benefit from palliative care but unsure currently      Vitamins   - cont mvi po daily   - cont ascorbic acid 500mg po q day   - cont iron tabs po daily   - cont alendronate sodium 70mg q weds   - cont folic acid 800 mcg po daily   - cont zinc 220mg po daily x 14 days      Inflammation /RA  - cont methotrexate 15mg po every Tuesday  - advanced- hands contracted      GERD  - cont pantoprazole 40mg po bid        Medication Reconciliation Completed:  Yes - All medications  and side effects reviewed with patient    FOLLOW UP APPOINTMENTS  Future Appointments   Date Time Provider Department Center   3/5/2024  2:20 PM Johnathon Rodriguez, DO [facility]        This is a 35 minute visit and greater than 50% of the time was spent counseling the patient and/or coordinating care.    Note to patient: The 21st Century Cures Act makes medical notes like these available to patients in the interest of transparency. However, this is a medical document intended as peer to peer communication. It is written in medical language and may contain abbreviations or verbiage that are unfamiliar. It may appear blunt or direct. Medical documents are intended to carry relevant information, facts as evident, and the clinical opinion of the practitioner who signs the document.     Sandy Boogie, APRN  3/1/2024

## 2024-03-01 NOTE — TELEPHONE ENCOUNTER
Dr. Rodriguez,     *The ACKNOWLEDGE button has been moved to the top right ribbon*    Please sign off on form if you agree to: Fmla for dtr as discussed below  (place your signature on the first page only)    -From your Inbasket, Highlight the patient and click Chart   -Double click the 2/13/24 Forms Completion telephone encounter  -Scroll down to the Media section   -Click the blue Hyperlink: Fmla Dtr Dr Rodriguez 3/1/24  -Click Acknowledge located in the top right ribbon/menu   -Drag the mouse into the blank space of the document and a + sign will appear. Left click to   electronically sign the document.     Thank you,    Christine GRESHAM

## 2024-03-08 NOTE — TELEPHONE ENCOUNTER
I tried to call patient to try to set her up to be seen in discharge clinic as advised in prior telephone encounter (I had already called her 3 times)  Call rang once and went directly to voicemail.    CSS:  ok to offer open yellow spot on Tiffany's schedule on 4/8, 4/15, or 4/19 if she calls back.

## 2024-03-11 ENCOUNTER — TELEPHONE (OUTPATIENT)
Dept: CARDIOLOGY CLINIC | Facility: HOSPITAL | Age: 79
End: 2024-03-11

## 2024-03-11 NOTE — TELEPHONE ENCOUNTER
----- Message from Davina Wright NP sent at 3/10/2024 10:47 PM CDT -----  Regarding: Needs [facility] f/u appt for CHF  Can you call pt to [facility] f/u appt for CHF, she was discharged from SNF 3/1/24. Thx.

## 2024-03-13 ENCOUNTER — TELEPHONE (OUTPATIENT)
Dept: FAMILY MEDICINE CLINIC | Facility: CLINIC | Age: 79
End: 2024-03-13

## 2024-03-13 NOTE — TELEPHONE ENCOUNTER
Patient's daughter came to office to drop off FMLA for her to care for her mother. JENNIFER was sign no fee was paid. Forms will be sent to the forms department.

## 2024-03-15 DIAGNOSIS — M51.36 LUMBAR DEGENERATIVE DISC DISEASE: ICD-10-CM

## 2024-03-15 DIAGNOSIS — M54.2 BILATERAL POSTERIOR NECK PAIN: ICD-10-CM

## 2024-03-15 DIAGNOSIS — M05.79 RHEUMATOID ARTHRITIS INVOLVING MULTIPLE SITES WITH POSITIVE RHEUMATOID FACTOR (HCC): ICD-10-CM

## 2024-03-15 DIAGNOSIS — M06.9 RHEUMATOID ARTHRITIS OF HAND, UNSPECIFIED LATERALITY, UNSPECIFIED WHETHER RHEUMATOID FACTOR PRESENT (HCC): ICD-10-CM

## 2024-03-15 RX ORDER — HYDROCODONE BITARTRATE AND ACETAMINOPHEN 10; 325 MG/1; MG/1
1 TABLET ORAL EVERY 6 HOURS PRN
Qty: 60 TABLET | Refills: 0 | Status: SHIPPED | OUTPATIENT
Start: 2024-03-15

## 2024-03-15 NOTE — TELEPHONE ENCOUNTER
Protocol Failed/ No Protocol    Requested Prescriptions   Pending Prescriptions Disp Refills    HYDROcodone-acetaminophen (NORCO)  MG Oral Tab 60 tablet 0     Sig: Take 1 tablet by mouth every 6 (six) hours as needed for Pain.       Controlled Substance Medication Failed - 3/15/2024  1:21 PM        Failed - This medication is a controlled substance - forward to provider to refill               Recent Outpatient Visits              2 months ago Arthropathy of cervical facet joint    [facility] [place]    Office Visit    6 months [facility]    [facility]    6 months ago Medicare annual wellness visit, initial    [facility] Johnathon Rodriguez, DO    Office Visit    7 months ago Cervical radiculopathy    [facility] Felicitas Lee, DO    Office Visit    8 months ago Cervical radiculopathy    [facility]    Office Visit

## 2024-03-21 NOTE — TELEPHONE ENCOUNTER
Called pt isidro Landon on person to contact, Lmtcb. Please obtain details. Also confirm if fmla forms needed. I do see fmla forms being completed in Feb.

## 2024-03-22 ENCOUNTER — TELEPHONE (OUTPATIENT)
Dept: FAMILY MEDICINE CLINIC | Facility: CLINIC | Age: 79
End: 2024-03-22

## 2024-03-22 NOTE — TELEPHONE ENCOUNTER
Javon from [facility] is calling to advise PCP, the patient will be discharged from [facility] on 3/23/24.    [facility] has received orders for home health care.   Will PCP sign orders.   Please advise.

## 2024-03-22 NOTE — TELEPHONE ENCOUNTER
Please let the caller know I will be signing everything pertinent to this patient's treatments.  Thank you.

## 2024-03-22 NOTE — TELEPHONE ENCOUNTER
Left message for call back. Voicemail did not specify if it was confidential.     Asked for call back.

## 2024-03-25 NOTE — TELEPHONE ENCOUNTER
Spoke to Javon from [facility] and relayed Dr. Rodriguez's message below.     Per Javon, they will be faxing over plan of care to Dr. Rodriguez's office,  Confirmed [place] Fax number: [phone].

## 2024-03-27 NOTE — TELEPHONE ENCOUNTER
Called patient and LMTCB- called to obtain more details on pt dtr fmla forms recently received - forms were completed in feb wanted to make sure if these new forms are needed- forms placed in archive labeled Dupe

## 2024-04-08 DIAGNOSIS — M06.9 RHEUMATOID ARTHRITIS OF HAND, UNSPECIFIED LATERALITY, UNSPECIFIED WHETHER RHEUMATOID FACTOR PRESENT (HCC): ICD-10-CM

## 2024-04-08 DIAGNOSIS — M51.36 LUMBAR DEGENERATIVE DISC DISEASE: ICD-10-CM

## 2024-04-08 DIAGNOSIS — M54.2 BILATERAL POSTERIOR NECK PAIN: ICD-10-CM

## 2024-04-08 DIAGNOSIS — M05.79 RHEUMATOID ARTHRITIS INVOLVING MULTIPLE SITES WITH POSITIVE RHEUMATOID FACTOR (HCC): ICD-10-CM

## 2024-04-09 NOTE — TELEPHONE ENCOUNTER
Please review. Rx failed/no protocol.    Patient's dispense hx: 03/16/24 (15d), 02/19/24 (15d), and 01/18/24(30d).    Requested Prescriptions   Pending Prescriptions Disp Refills    HYDROcodone-acetaminophen (NORCO)  MG Oral Tab 60 tablet 0     Sig: Take 1 tablet by mouth every 6 (six) hours as needed for Pain.       Controlled Substance Medication Failed - 4/8/2024 10:54 AM        Failed - This medication is a controlled substance - forward to provider to refill             Future Appointments         Provider Department Appt Notes    In 2 days [facility] RN RADIOLOGY 3; [facility] CT RM1 CARD [facility] CT CTA INSTRUCTIONS GIVEN    In 2 weeks Eyad Arvizu MD [facility] Poor kidney function & Decreased GFR          Recent Outpatient Visits              3 months ago Arthropathy of cervical facet joint    [facility] Felicitas Lee, DO    Office Visit    7 months ago Cervical radiculopathy    [facility] Felicitas Lee, DO    Office Visit    7 months ago Medicare annual wellness visit, initial    [facility] Johnathon Rodriguez, DO    Office Visit    8 months ago Cervical radiculopathy    [facility] Felicitas Lee, DO    Office Visit    8 months ago Cervical radiculopathy    [facility] Felicitas Lee, DO    Office Visit

## 2024-04-10 RX ORDER — AMITRIPTYLINE HYDROCHLORIDE 25 MG/1
25 TABLET, FILM COATED ORAL NIGHTLY
Qty: 30 TABLET | Refills: 0 | Status: SHIPPED | OUTPATIENT
Start: 2024-04-10

## 2024-04-10 RX ORDER — HYDROCODONE BITARTRATE AND ACETAMINOPHEN 10; 325 MG/1; MG/1
1 TABLET ORAL EVERY 6 HOURS PRN
Qty: 60 TABLET | Refills: 0 | Status: SHIPPED | OUTPATIENT
Start: 2024-04-10

## 2024-04-10 NOTE — TELEPHONE ENCOUNTER
Refill Request    Medication request: AMITRIPTYLINE 25 MG Oral Tab TAKE 1 TABLET(25 MG) BY MOUTH EVERY NIGHT     LOV:12/26/2023 Felicitas Lee DO  (Patient had no show appointment 2/06/2024)  Due back to clinic per last office note:  \"Return in about 4 weeks \"    NOV: Visit date not found      [place]: 1[place]03/2024 #30    Urine drug screen (if applicable): N/A  Pain contract: N/A    LOV plan (if weaning or changing medications): Per Dr. Lee's LOV notes: \"Patient would like to have new prescription for Norco to help with her symptoms. I advised her I will refill this for her for the next 7 to 10 days but I recommend that she seek pain management consultation for long-term management of opioid medicine. \"  From H&P: \"She states since last visit she has stopped taking amitriptyline as she ran out of the medicine as well as Menan as her primary care doctor would not refill this further. \"        NOTE: Patient overdue for follow up- routed to Dr. Lee to sign off on.  Reminder sent to patient to scheduled follow up to avoid future refill delays.

## 2024-04-11 ENCOUNTER — HOSPITAL ENCOUNTER (OUTPATIENT)
Dept: CT IMAGING | Facility: HOSPITAL | Age: 79
Discharge: HOME | End: 2024-04-11
Attending: INTERNAL MEDICINE
Payer: MEDICARE

## 2024-04-11 VITALS
HEIGHT: 64.5 IN | RESPIRATION RATE: 17 BRPM | HEART RATE: 60 BPM | WEIGHT: 126 LBS | DIASTOLIC BLOOD PRESSURE: 78 MMHG | SYSTOLIC BLOOD PRESSURE: 119 MMHG | BODY MASS INDEX: 21.25 KG/M2

## 2024-04-11 DIAGNOSIS — I73.9 CLAUDICATION (HCC): ICD-10-CM

## 2024-04-11 DIAGNOSIS — I50.20 ACC/AHA STAGE B SYSTOLIC HEART FAILURE (HCC): ICD-10-CM

## 2024-04-11 DIAGNOSIS — Z95.810 S/P IMPLANTATION OF AUTOMATIC CARDIOVERTER/DEFIBRILLATOR (AICD): ICD-10-CM

## 2024-04-11 LAB
CREAT BLD-MCNC: 1.9 MG/DL
EGFRCR SERPLBLD CKD-EPI 2021: 27 ML/MIN/1.73M2 (ref 60–?)

## 2024-04-11 PROCEDURE — 82565 ASSAY OF CREATININE: CPT

## 2024-04-11 RX ORDER — METOPROLOL TARTRATE 1 MG/ML
5 INJECTION, SOLUTION INTRAVENOUS SEE ADMIN INSTRUCTIONS
Status: DISCONTINUED | OUTPATIENT
Start: 2024-04-11 | End: 2024-04-13

## 2024-04-11 RX ORDER — DILTIAZEM HYDROCHLORIDE 5 MG/ML
5 INJECTION INTRAVENOUS SEE ADMIN INSTRUCTIONS
Status: DISCONTINUED | OUTPATIENT
Start: 2024-04-11 | End: 2024-04-13

## 2024-04-11 RX ORDER — NITROGLYCERIN 0.4 MG/1
0.4 TABLET SUBLINGUAL ONCE
Status: DISCONTINUED | OUTPATIENT
Start: 2024-04-11 | End: 2024-04-13

## 2024-04-11 RX ORDER — ALPRAZOLAM 0.25 MG/1
TABLET ORAL
Status: COMPLETED
Start: 2024-04-11 | End: 2024-04-11

## 2024-04-11 RX ORDER — ALPRAZOLAM 0.25 MG/1
0.25 TABLET ORAL ONCE AS NEEDED
Status: COMPLETED | OUTPATIENT
Start: 2024-04-11 | End: 2024-04-11

## 2024-04-11 RX ADMIN — ALPRAZOLAM 0.25 MG: 0.25 TABLET ORAL at 10:16:00

## 2024-04-11 NOTE — IMAGING NOTE
TO RAD HOLDING AT ***      HX TAKEN: ***    PT CONSENTED AT ***     BASELINE VITAL SIGNS: HR ***  BP *** BMI ***    CTA ORDERED BY DR LIEBERMAN WAS PT GIVEN CTA PREMEDS: NO, IF YES ***    METOPROLOL PO GIVEN 50 MILLIGRAMS  MILLIGRAMS  AT ***    METOPROLOL PO GIVEN 50 MILLIGRAMS  MILLIGRAMS  AT ***    18 GAUGE IV STARTED AT *** POC TESTING COMPLETED GFR = ***   CREATINE = ***    ***: HR *** BP *** METOPROLOL 5 MILLIGRAMS GIVEN IV PUSH    ***: HR *** BP *** METOPROLOL 5 MILLIGRAMS GIVEN IV PUSH     ***: HR *** BP *** METOPROLOL 5 MILLIGRAMS  GIVEN IV PUSH    ***: HR *** BP *** METOPROLOL 5 MILLIGRAMS  GIVEN IV PUSH    ***: HR *** BP *** CARDIZEM 5 MILLIGRAMS  GIVEN IV PUSH     ***: HR *** BP *** CARDIZEM 5 MILLIGRAMS  GIVEN IV PUSH     ***: HR *** BP *** CARDIZEM 5 MILLIGRAMS  GIVEN IV PUSH     ***: HR *** BP *** CARDIZEM 5 MILLIGRAMS  GIVEN IV PUSH     ***: HR *** BP *** CARDIZEM 5 MILLIGRAMS  GIVEN IV PUSH     TO CT TABLE @ ***    CONNECT TO MONITOR  HR *** BP ***      NITROGLYCERIN 0.4 MILLIGRAMS SUBLINGUAL GIVEN AT ***     CALCIUM SCORE COMPLETED AT ***     INJECTION STARTED AT*** HR *** DURING SCAN PROCEDURE COMPLETE    POST SCAN HR *** BP *** AT ***    PT TO HOLDING AREA  VS ***      AVS  PROVIDED      *** DISCHARGED HOME

## 2024-04-11 NOTE — IMAGING NOTE
TO RAD HOLDING AT 0948, PT IN WHEELCHAIR, SPOUSE PRESENT.       HX TAKEN: INTERMITTENT SEVERE DYSPNEA ON EXERTION. PER EMR NOTES PT HAD PAF. PT IS ANXIOUS ABOUT TEST & RESULTS, ASKED FOR XANAX. HER SPOUSE IS HER .     PT CONSENTED AT 1000     BASELINE VITAL SIGNS: HR 60, PACED,  /78, BMI 21.3    CTA ORDERED BY RJ LIEBERMAN MD, WAS PT GIVEN CTA PREMEDS: NO    ASSISTED PT TO BATHROOM IN WHEELCHAIR     1016 XANAX 0.25 MG PO GIVEN    20 GAUGE IV STARTED AT 1032, POC TESTING COMPLETED GFR = 27  CREATINE = 1.9    1027 CT TECH NOTIFIED OF GFR,  ON CHART REVIEW 2/18 CREAT 1.16, GFR 49    1044 PAGED DR GARZA, READING CARDIOLOGIST    1048 INFORMED PT & SPOUSE OF CREAT & GFR, NEED READING CARDIOLOGIST APPROVAL TO PROCEED DUE TO FURTHER RISK TO RENAL FUNCTION FROM IV CONTRAST, PT UNDERSTANDS, PT WAS DOZING.      1055 CALLED DR GARZA, REACHED     1109 CALLED [facility] WITH INFO, UNABLE TO REACH DR GARZA, ASKED IF DR AMIN IN OFFICE. SHE SAID DR LIEBERMAN NOT IN CLINIC BUT DR GARZA IS, WILL HAVE RN OR MD TAKE MY CALL. SPOKE TO JEFFERY MORA, WHO SAID SHE WILL INFORM DR LIEBERMAN, CANCEL TEST, THEY WILL F/U WITH HER.     PT & SPOUSE INFORMED I SPOKE TO [facility] RN FOR DR LIEBERMAN, EXPECT CALL FROM [facility] AFTER  DR LIEBERMAN GIVES NEW ORDERS. PT SAID SHE HAS APPT W/NEPHROLOGIST LATER THIS MONTH. PT FULLY AWAKE.    1129 DISCHARGED PT IN WHEELCHAIR TO SPOUSE'S CAR, SON IS ALSO PRESENT, PT FEELS WELL.

## 2024-04-17 RX ORDER — AMITRIPTYLINE HYDROCHLORIDE 25 MG/1
25 TABLET, FILM COATED ORAL NIGHTLY
Qty: 30 TABLET | Refills: 0 | OUTPATIENT
Start: 2024-04-17

## 2024-04-17 NOTE — TELEPHONE ENCOUNTER
Refill Request    Medication request: amitriptyline 25 MG Oral Tab.  Take 1 tablet (25 mg total) by mouth nightly.      LOV:12/26/2023 Felicitas Lee,    Due back to clinic per last office note:  injection  NOV: Visit date not found      [place]: 4/10/2024 #30 (30 days)    Urine drug screen (if applicable): N/A  Pain contract: N/A    LOV plan (if weaning or changing medications): not mentioned    Medication was signed on refill request on 4/10/2024 for a 30 day supply. Refill requested too soon.

## 2024-04-18 ENCOUNTER — HOSPITAL ENCOUNTER (OUTPATIENT)
Dept: NUCLEAR MEDICINE | Facility: HOSPITAL | Age: 79
Discharge: HOME | End: 2024-04-18
Attending: INTERNAL MEDICINE
Payer: MEDICARE

## 2024-04-18 ENCOUNTER — HOSPITAL ENCOUNTER (OUTPATIENT)
Dept: GENERAL RADIOLOGY | Facility: HOSPITAL | Age: 79
Discharge: HOME | End: 2024-04-18
Attending: INTERNAL MEDICINE
Payer: MEDICARE

## 2024-04-18 DIAGNOSIS — R06.02 SOB (SHORTNESS OF BREATH): ICD-10-CM

## 2024-04-18 PROCEDURE — 71046 X-RAY EXAM CHEST 2 VIEWS: CPT | Performed by: INTERNAL MEDICINE

## 2024-04-18 PROCEDURE — 78582 LUNG VENTILAT&PERFUS IMAGING: CPT | Performed by: INTERNAL MEDICINE

## 2024-04-23 ENCOUNTER — TELEPHONE (OUTPATIENT)
Dept: NEPHROLOGY | Facility: CLINIC | Age: 79
End: 2024-04-23

## 2024-04-23 ENCOUNTER — OFFICE VISIT (OUTPATIENT)
Dept: NEPHROLOGY | Facility: CLINIC | Age: 79
End: 2024-04-23

## 2024-04-23 ENCOUNTER — LAB ENCOUNTER (OUTPATIENT)
Dept: LAB | Facility: HOSPITAL | Age: 79
End: 2024-04-23
Attending: INTERNAL MEDICINE
Payer: MEDICARE

## 2024-04-23 VITALS
BODY MASS INDEX: 21.25 KG/M2 | HEIGHT: 64.5 IN | DIASTOLIC BLOOD PRESSURE: 56 MMHG | SYSTOLIC BLOOD PRESSURE: 117 MMHG | HEART RATE: 60 BPM | WEIGHT: 126 LBS

## 2024-04-23 DIAGNOSIS — N18.31 STAGE 3A CHRONIC KIDNEY DISEASE (HCC): ICD-10-CM

## 2024-04-23 DIAGNOSIS — N18.31 STAGE 3A CHRONIC KIDNEY DISEASE (HCC): Primary | ICD-10-CM

## 2024-04-23 LAB
ALBUMIN SERPL-MCNC: 4 G/DL (ref 3.2–4.8)
ANION GAP SERPL CALC-SCNC: 8 MMOL/L (ref 0–18)
BASOPHILS # BLD AUTO: 0.05 X10(3) UL (ref 0–0.2)
BASOPHILS NFR BLD AUTO: 0.9 %
BILIRUB UR QL: NEGATIVE
BUN BLD-MCNC: 28 MG/DL (ref 9–23)
BUN/CREAT SERPL: 20.9 (ref 10–20)
C3 SERPL-MCNC: 140.7 MG/DL (ref 90–170)
C4 SERPL-MCNC: 23.1 MG/DL (ref 12–36)
CALCIUM BLD-MCNC: 9.9 MG/DL (ref 8.7–10.4)
CHLORIDE SERPL-SCNC: 103 MMOL/L (ref 98–112)
CLARITY UR: CLEAR
CO2 SERPL-SCNC: 25 MMOL/L (ref 21–32)
CREAT BLD-MCNC: 1.34 MG/DL
CREAT UR-SCNC: 11.5 MG/DL
CRP SERPL-MCNC: 5.1 MG/DL (ref ?–1)
DEPRECATED RDW RBC AUTO: 69.8 FL (ref 35.1–46.3)
EGFRCR SERPLBLD CKD-EPI 2021: 41 ML/MIN/1.73M2 (ref 60–?)
EOSINOPHIL # BLD AUTO: 0.3 X10(3) UL (ref 0–0.7)
EOSINOPHIL NFR BLD AUTO: 5.6 %
ERYTHROCYTE [DISTWIDTH] IN BLOOD BY AUTOMATED COUNT: 20.6 % (ref 11–15)
ERYTHROCYTE [SEDIMENTATION RATE] IN BLOOD: 61 MM/HR
GLUCOSE BLD-MCNC: 76 MG/DL (ref 70–99)
GLUCOSE UR-MCNC: NORMAL MG/DL
HCT VFR BLD AUTO: 28.3 %
HGB BLD-MCNC: 8.8 G/DL
HGB UR QL STRIP.AUTO: NEGATIVE
IMM GRANULOCYTES # BLD AUTO: 0.07 X10(3) UL (ref 0–1)
IMM GRANULOCYTES NFR BLD: 1.3 %
IRON SATN MFR SERPL: 18 %
IRON SERPL-MCNC: 41 UG/DL
KETONES UR-MCNC: NEGATIVE MG/DL
LEUKOCYTE ESTERASE UR QL STRIP.AUTO: 250
LYMPHOCYTES # BLD AUTO: 0.89 X10(3) UL (ref 1–4)
LYMPHOCYTES NFR BLD AUTO: 16.5 %
MCH RBC QN AUTO: 30 PG (ref 26–34)
MCHC RBC AUTO-ENTMCNC: 31.1 G/DL (ref 31–37)
MCV RBC AUTO: 96.6 FL
MICROALBUMIN UR-MCNC: <0.3 MG/DL
MONOCYTES # BLD AUTO: 0.37 X10(3) UL (ref 0.1–1)
MONOCYTES NFR BLD AUTO: 6.9 %
NEUTROPHILS # BLD AUTO: 3.71 X10 (3) UL (ref 1.5–7.7)
NEUTROPHILS # BLD AUTO: 3.71 X10(3) UL (ref 1.5–7.7)
NEUTROPHILS NFR BLD AUTO: 68.8 %
NITRITE UR QL STRIP.AUTO: NEGATIVE
OSMOLALITY SERPL CALC.SUM OF ELEC: 286 MOSM/KG (ref 275–295)
PH UR: 6.5 [PH] (ref 5–8)
PHOSPHATE SERPL-MCNC: 4.2 MG/DL (ref 2.4–5.1)
PLATELET # BLD AUTO: 172 10(3)UL (ref 150–450)
PLATELET MORPHOLOGY: NORMAL
POTASSIUM SERPL-SCNC: 4.8 MMOL/L (ref 3.5–5.1)
PROT UR-MCNC: NEGATIVE MG/DL
RBC # BLD AUTO: 2.93 X10(6)UL
RHEUMATOID FACT SERPL-ACNC: 72 IU/ML (ref ?–14)
SODIUM SERPL-SCNC: 136 MMOL/L (ref 136–145)
SP GR UR STRIP: 1.01 (ref 1–1.03)
TIBC SERPL-MCNC: 222 UG/DL (ref 250–425)
TRANSFERRIN SERPL-MCNC: 149 MG/DL (ref 250–380)
UROBILINOGEN UR STRIP-ACNC: NORMAL
WBC # BLD AUTO: 5.4 X10(3) UL (ref 4–11)

## 2024-04-23 PROCEDURE — 87186 SC STD MICRODIL/AGAR DIL: CPT

## 2024-04-23 PROCEDURE — 3074F SYST BP LT 130 MM HG: CPT | Performed by: INTERNAL MEDICINE

## 2024-04-23 PROCEDURE — 86038 ANTINUCLEAR ANTIBODIES: CPT

## 2024-04-23 PROCEDURE — 82784 ASSAY IGA/IGD/IGG/IGM EACH: CPT

## 2024-04-23 PROCEDURE — 1159F MED LIST DOCD IN RCRD: CPT | Performed by: INTERNAL MEDICINE

## 2024-04-23 PROCEDURE — 84165 PROTEIN E-PHORESIS SERUM: CPT

## 2024-04-23 PROCEDURE — 85025 COMPLETE CBC W/AUTO DIFF WBC: CPT

## 2024-04-23 PROCEDURE — 99214 OFFICE O/P EST MOD 30 MIN: CPT | Performed by: INTERNAL MEDICINE

## 2024-04-23 PROCEDURE — 80069 RENAL FUNCTION PANEL: CPT

## 2024-04-23 PROCEDURE — 85652 RBC SED RATE AUTOMATED: CPT

## 2024-04-23 PROCEDURE — 87077 CULTURE AEROBIC IDENTIFY: CPT

## 2024-04-23 PROCEDURE — 3008F BODY MASS INDEX DOCD: CPT | Performed by: INTERNAL MEDICINE

## 2024-04-23 PROCEDURE — 82043 UR ALBUMIN QUANTITATIVE: CPT

## 2024-04-23 PROCEDURE — 81001 URINALYSIS AUTO W/SCOPE: CPT

## 2024-04-23 PROCEDURE — 36415 COLL VENOUS BLD VENIPUNCTURE: CPT

## 2024-04-23 PROCEDURE — 82570 ASSAY OF URINE CREATININE: CPT

## 2024-04-23 PROCEDURE — 86140 C-REACTIVE PROTEIN: CPT

## 2024-04-23 PROCEDURE — 86160 COMPLEMENT ANTIGEN: CPT

## 2024-04-23 PROCEDURE — 3078F DIAST BP <80 MM HG: CPT | Performed by: INTERNAL MEDICINE

## 2024-04-23 PROCEDURE — 84466 ASSAY OF TRANSFERRIN: CPT

## 2024-04-23 PROCEDURE — 86431 RHEUMATOID FACTOR QUANT: CPT

## 2024-04-23 PROCEDURE — 83540 ASSAY OF IRON: CPT

## 2024-04-23 PROCEDURE — 86334 IMMUNOFIX E-PHORESIS SERUM: CPT

## 2024-04-23 PROCEDURE — 87086 URINE CULTURE/COLONY COUNT: CPT

## 2024-04-23 NOTE — PROGRESS NOTES
04/23/24        Patient: Margaret Silverio   YOB: 1945   Date of Visit: 4/23/2024       Dear  Dr. Rodriguez, DO,      Thank you for referring Margaret Silverio to my practice.  Please find my assessment and plan below.      As you know she is a 79-year-old female with a history of nonischemic cardiomyopathy with a left ventricular ejection fraction of 15 to 20% and grade 4 diastolic dysfunction, status post AICD, atrial fibrillation, status post bioprosthetic valve replacement, rheumatoid arthritis, hypertension, anemia, and GI bleeds from AVMs who I now had the pleasure of seeing for evaluation of chronic kidney disease stage III.  She was seen during her recent hospitalization at [facility] in February 2024.  She presented with decompensated rheumatoid arthritis, congestive heart failure.  Required dobutamine and milrinone infusions along with diuretics.  Creatinine fluctuated from 1.06 to as high as 1.68.  Creatinine at time of discharge on February 2, 2024 was 1.16 with an estimated GFR 49 cc/min.  Hemoglobin was 10.9.  Patient subsequently went to rehab.  She is now back home.  Dr. Boles ordered a CT angiogram of the pulmonary artery on April 11, 2024.  However was postponed as she had a point-of-care creatinine of 1.9.  Now here for follow-up.    Overall the patient states her energy seems to wax and wane.  Still gets some sense of dyspnea on exertion although seems comfortable at rest.  Still walking with a walker.  Appetite has been poor but now seems to be improving.  No chest pain, GI symptoms.  Does seem to have some problems with stress incontinence.  No true dysuria, fevers or chills.  Wants to see a rheumatologist here regarding her rheumatoid arthritis.  Social history she is a non-smoker.  Recently moved from [place] to live closer to her children.  Medications as listed but she states that Bumex was discontinued at the nursing home.    On physical exam her blood pressure is 117/56  with a pulse of 60 and she weighed 126 pounds.  Her neck was supple without JVD.  Lungs are clear.  Heart revealed a regular rate and rhythm and S4 no gallops, murmurs or rubs.  Abdomen soft, flat, nontender without organomegaly, masses or bruits.  Extremities revealed no edema.    I informed the patient and her family that she probably has underlying chronic kidney disease stage III.  Her creatinine though was higher at 1.9 in April 11, 2024.  She states Bumex was discontinued when she was at the nursing home.  However remains on Entresto.  Patient is reluctant to push p.o. fluids secondary to urinary incontinence.  Otherwise on no other nephrotoxic medications.  She did have a CT scan done in January 2024 which showed a persistent bilateral nephrogram which could be secondary to ATN, hypotension or bilateral renal artery stenosis.  Creatinine is 1.3 on that date.    Encouraged the patient to increase her fluid intake.  She knows to avoid nonsteroidals.  Will have her repeat her CBC, renal panel, urine studies and connective tissue studies to rule out other causes.  May need a Doppler renal ultrasound depending upon the above results.  They understand that she is at risk for cardiorenal syndrome along with contrast-induced nephropathy.  Further impressions and recommendations will be forthcoming after reviewing the above.    Thank you very much for allowing me to participate in the care of your patient.  If you have any questions please feel free to call.           Sincerely,   Eyad Arvizu MD   [facility] MEDICAL [facility], [facility], [place]  [address]  [facility] [id number]    Document electronically generated by:  Eyad Arvizu MD

## 2024-04-23 NOTE — TELEPHONE ENCOUNTER
The patient also wanted to see one of our rheumatologist here for rheumatoid arthritis.  Please give her names and phone number.

## 2024-04-25 ENCOUNTER — TELEPHONE (OUTPATIENT)
Dept: NEPHROLOGY | Facility: CLINIC | Age: 79
End: 2024-04-25

## 2024-04-25 DIAGNOSIS — M54.2 BILATERAL POSTERIOR NECK PAIN: ICD-10-CM

## 2024-04-25 DIAGNOSIS — M06.9 RHEUMATOID ARTHRITIS OF HAND, UNSPECIFIED LATERALITY, UNSPECIFIED WHETHER RHEUMATOID FACTOR PRESENT (HCC): ICD-10-CM

## 2024-04-25 DIAGNOSIS — M51.36 LUMBAR DEGENERATIVE DISC DISEASE: ICD-10-CM

## 2024-04-25 DIAGNOSIS — M05.79 RHEUMATOID ARTHRITIS INVOLVING MULTIPLE SITES WITH POSITIVE RHEUMATOID FACTOR (HCC): ICD-10-CM

## 2024-04-25 LAB
ALBUMIN SERPL ELPH-MCNC: 3.63 G/DL (ref 3.75–5.21)
ALBUMIN/GLOB SERPL: 1.02 {RATIO} (ref 1–2)
ALPHA1 GLOB SERPL ELPH-MCNC: 0.52 G/DL (ref 0.19–0.46)
ALPHA2 GLOB SERPL ELPH-MCNC: 0.81 G/DL (ref 0.48–1.05)
B-GLOBULIN SERPL ELPH-MCNC: 0.77 G/DL (ref 0.68–1.23)
GAMMA GLOB SERPL ELPH-MCNC: 1.47 G/DL (ref 0.62–1.7)
IGA SERPL-MCNC: 224.1 MG/DL (ref 40–350)
IGM SERPL-MCNC: 207.1 MG/DL (ref 50–300)
IMMUNOGLOBULIN PNL SER-MCNC: 1667 MG/DL (ref 650–1600)
NUCLEAR IGG TITR SER IF: NEGATIVE {TITER}
PROT SERPL-MCNC: 7.2 G/DL (ref 5.7–8.2)

## 2024-04-25 RX ORDER — CIPROFLOXACIN 250 MG/1
250 TABLET, FILM COATED ORAL 2 TIMES DAILY
Qty: 10 TABLET | Refills: 0 | Status: SHIPPED | OUTPATIENT
Start: 2024-04-25 | End: 2024-04-30

## 2024-04-25 NOTE — TELEPHONE ENCOUNTER
Related test results  Instructed to start Cipro medication (sent to pharmacy); no allergy.  Denies GI bleeding; agreed to look for tarry or red stools  Increase Ferrous sulfate to 2 times a day; denies side effects  Labs to be done 2 weeks (standing order)    Voiced understanding; sent my chart instructions  My chart message sent due to pt unable to write instructions and preferred my chart message for reference.

## 2024-04-25 NOTE — TELEPHONE ENCOUNTER
Kidney function was better compared to when she tried to do the CT scan.  She does have a low-grade UTI.  Start Cipro 250 mg twice daily x 5 days.  I am concerned though because her hemoglobin dropped.  Any signs of GI bleeding?.  Iron levels were a little low.  If taking ferrous sulfate 1 a day see if she can increase it to twice daily if no side effects.  Repeat CBC and renal panel in 2 weeks to make sure things remain stable.  Make standing order.

## 2024-04-26 ENCOUNTER — TELEPHONE (OUTPATIENT)
Dept: NEPHROLOGY | Facility: CLINIC | Age: 79
End: 2024-04-26

## 2024-04-26 DIAGNOSIS — N18.32 STAGE 3B CHRONIC KIDNEY DISEASE (HCC): Primary | ICD-10-CM

## 2024-04-26 NOTE — TELEPHONE ENCOUNTER
REFILL PASSED PER [facility] PROTOCOLS    Recent fills: 2/19/2024, 3/16/2024, 4/10/2024  Last Rx written: 4/10/2024  Last office visit: 8/21/2023    Future Appointments  Date Type Provider Dept   06/25/24 Appointment Johnathon Rodriguez DO [facility]   Showing future appointments within next 150 days with a meds authorizing provider and meeting all other requirements        Requested Prescriptions   Pending Prescriptions Disp Refills    HYDROcodone-acetaminophen (NORCO)  MG Oral Tab 60 tablet 0     Sig: Take 1 tablet by mouth every 6 (six) hours as needed for Pain.       Controlled Substance Medication Failed - 4/25/2024  2:35 PM        Failed - This medication is a controlled substance - forward to provider to refill             Future Appointments         Provider Department Appt Notes    In 6 days Felicitas Lee DO [facility], [address], [place] Head and neck pain    In 2 months Johnathon Rodriguez DO [facility] and rehab stay          Recent Outpatient Visits              3 days ago Stage 3a chronic kidney disease (HCC)    [facility], [facility] Eyad Arvizu MD    Office Visit    4 months ago Arthropathy of cervical facet joint    [facility] Felicitas Lee,     Office Visit    8 months ago Cervical radiculopathy    [facility] Felicitas Lee,     Office Visit    8 months ago Medicare annual wellness visit, initial    [facility] Johnathon Rodriguez DO    Office Visit    8 months ago Cervical radiculopathy    [facility] Felicitas Lee,     Office Visit

## 2024-04-28 RX ORDER — HYDROCODONE BITARTRATE AND ACETAMINOPHEN 10; 325 MG/1; MG/1
1 TABLET ORAL EVERY 6 HOURS PRN
Qty: 60 TABLET | Refills: 0 | Status: SHIPPED | OUTPATIENT
Start: 2024-04-28

## 2024-05-02 ENCOUNTER — OFFICE VISIT (OUTPATIENT)
Dept: PHYSICAL MEDICINE AND REHAB | Facility: CLINIC | Age: 79
End: 2024-05-02
Payer: COMMERCIAL

## 2024-05-02 VITALS
HEART RATE: 58 BPM | WEIGHT: 126 LBS | OXYGEN SATURATION: 100 % | DIASTOLIC BLOOD PRESSURE: 60 MMHG | HEIGHT: 64 IN | SYSTOLIC BLOOD PRESSURE: 118 MMHG | BODY MASS INDEX: 21.51 KG/M2

## 2024-05-02 DIAGNOSIS — M48.02 DEGENERATIVE CERVICAL SPINAL STENOSIS: ICD-10-CM

## 2024-05-02 DIAGNOSIS — M43.12 ANTEROLISTHESIS OF CERVICAL SPINE: ICD-10-CM

## 2024-05-02 DIAGNOSIS — M54.12 CERVICAL RADICULOPATHY: Primary | ICD-10-CM

## 2024-05-02 PROCEDURE — 99214 OFFICE O/P EST MOD 30 MIN: CPT | Performed by: PHYSICAL MEDICINE & REHABILITATION

## 2024-05-02 PROCEDURE — 1125F AMNT PAIN NOTED PAIN PRSNT: CPT | Performed by: PHYSICAL MEDICINE & REHABILITATION

## 2024-05-02 PROCEDURE — 3008F BODY MASS INDEX DOCD: CPT | Performed by: PHYSICAL MEDICINE & REHABILITATION

## 2024-05-02 PROCEDURE — 3074F SYST BP LT 130 MM HG: CPT | Performed by: PHYSICAL MEDICINE & REHABILITATION

## 2024-05-02 PROCEDURE — 1159F MED LIST DOCD IN RCRD: CPT | Performed by: PHYSICAL MEDICINE & REHABILITATION

## 2024-05-02 PROCEDURE — 3078F DIAST BP <80 MM HG: CPT | Performed by: PHYSICAL MEDICINE & REHABILITATION

## 2024-05-02 PROCEDURE — 1160F RVW MEDS BY RX/DR IN RCRD: CPT | Performed by: PHYSICAL MEDICINE & REHABILITATION

## 2024-05-02 NOTE — PROGRESS NOTES
[facility]  OFFICE FOLLOW UP EVALUATION      HISTORY OF PRESENT ILLNESS:     Chief Complaint   Patient presents with    Follow - Up     LOV 12/26/23. Patient f/u on neck pain. Pain 8/10. Patient states she takes NORCO for the pain every 6 hours. Patient did not see pain management. Denies N/T. Denies weakness.        Patient is following up for sided neck pain.  She states that she cannot proceed with the injection as discussed during last visit in December 2023 as she was hospitalized for pneumonia.  She also did rehab 3 weeks after her hospitalization.  She states the pain is still persistent and radiates into the head with headaches.  She has pain radiating to the left ear as well.  She denies any further radiating symptoms down the arm.  She is taking Norco for the pain.  She did not get a chance to see pain management at that time either.  She denies any changes in strength or bowel bladder.  Pain is constant and affects her ability to function.  She does apply topical Aspercreme and numbing patch which does provide some improvement at times    PHYSICAL EXAM:   /60   Pulse 58   Ht 64\"   Wt 126 lb (57.2 kg)   SpO2 100%   BMI 21.63 kg/m²       CERVICAL SPINE:  Inspection: no erythema, swelling, or obvious deformity  Palpation: no ttp over spinous process.  Tenderness to palpation of the cervical facet joints, paraspinals and upper traps bilaterally with trigger points in these areas noted  ROM: Severely restricted in all planes and pain worsened with forward flexion extension and sidebending rotation bilaterally  Strength: 5/5 in upper extremities bilaterally.  Distally in the hands, due to deformity of the hands from contractures from rheumatoid arthritis she has weakness and inability to make a fist with  strength  Sensation: Intact to light touch in all dermatomes of the bilateral upper extremities  Reflexes: 3/4 at C5, C6, C7 bilaterally  Facet  loading: Positive  Spurling Test: Positive for radicular symptoms down either extremity bilaterally  Elias's: Negative  Clonus: Negative  Patellar reflex: +1    IMAGING:     MRI cervical spine completed on 2/13/2023     I personally reviewed MRI imaging which is notable for 5 mm anterolisthesis of C4 relative to C5 with degenerative changes of the facet joint and severe facet arthropathy at this level.  Additionally, there are degenerative changes at multiple different cervical levels with varying degrees of foraminal stenosis with moderate foraminal stenosis left-sided at C6-7.  There is no significant spinal stenosis at any level.       All imaging results were reviewed and discussed with patient.      ASSESSMENT/PLAN:     1. Cervical radiculopathy    2. Degenerative cervical spinal stenosis    3. Anterolisthesis of cervical spine        Margaret Silverio is a 79 year old female following up for neck pain with cervical facet arthropathy.  I recommended the medial branch block procedure for the cervical facet joints for diagnostic procedure.  Advised patient to schedule this as we discussed previously and also to the pain management for further consideration of long-term pain management.    The patient verbalized understanding with the plan and was in agreement. All questions/concerns were addressed and there were no barriers to learning.  Please note Dragon dictation software was used to dictate this note and may result in inadvertent typos.    Felicitas Lee DO, FAAPMR & CAQSM  Physical Medicine and Rehabilitation  Sports and Spine Medicine    PAST MEDICAL HISTORY:     Past Medical History:    Anemia    Arrhythmia    Arthritis    Deep vein thrombosis (HCC)    Essential hypertension    High blood pressure    History of blood transfusion    Seizure disorder (HCC)    Seizures (HCC)         PAST SURGICAL HISTORY:     Past Surgical History:   Procedure Laterality Date    Colonoscopy N/A 01/17/2024    Dr. Rios     Colonoscopy N/A 1/17/2024    Procedure: COLONOSCOPY;  Surgeon: Zoraida Rios MD;  Location: [facility] ENDOSCOPY    Egd N/A 01/17/2024    Dr. Rios    Other surgical history  2017    Heart Surgery     Other surgical history  2020    Bilateral shoulder replacement          CURRENT MEDICATIONS:     Current Outpatient Medications   Medication Sig Dispense Refill    HYDROcodone-acetaminophen (NORCO)  MG Oral Tab Take 1 tablet by mouth every 6 (six) hours as needed for Pain. 60 tablet 0    amitriptyline 25 MG Oral Tab Take 1 tablet (25 mg total) by mouth nightly. 30 tablet 0    CARVEDILOL 3.125 MG Oral Tab Take 1 tablet (3.125 mg total) by mouth 2 (two) times daily with meals. 60 tablet 1    SACUBITRIL-VALSARTAN 49-51 MG Oral Tab Take 1 tablet by mouth 2 (two) times daily. 60 tablet 1    HYDRALAZINE 25 MG Oral Tab Take 1 tablet (25 mg total) by mouth every 8 (eight) hours. 90 tablet 1    ISOSORBIDE DINITRATE 10 MG Oral Tab Take 1 tablet (10 mg total) by mouth TID (Nitrates). 90 tablet 1    folic acid 800 MCG Oral Tab Take 1 tablet (800 mcg total) by mouth daily. 90 tablet 0    amiodarone 200 MG Oral Tab Take 1 tablet (200 mg total) by mouth daily.      pantoprazole 40 MG Oral Tab EC Take 1 tablet (40 mg total) by mouth 2 (two) times daily before meals. 60 tablet 0    methotrexate 2.5 MG Oral Tab TAKE 6 TABLETS BY MOUTH 1 TIME A WEEK 77 tablet 0    alendronate 70 MG Oral Tab Take 1 tablet (70 mg total) by mouth once a week. 12 tablet 0    ferrous sulfate 325 (65 FE) MG Oral Tab EC Take 1 tablet (325 mg total) by mouth daily with breakfast.           ALLERGIES:     Allergies   Allergen Reactions    Lisinopril Coughing         FAMILY HISTORY:   No family history on file.       SOCIAL HISTORY:     Social History     Socioeconomic History    Marital status:    Tobacco Use    Smoking status: Former     Current packs/day: 0.00     Types: Cigarettes     Quit date: 2014     Years since quitting: 10.3    Smokeless tobacco:  Never   Vaping Use    Vaping status: Never Used   Substance and Sexual Activity    Alcohol use: Never    Drug use: Never     Social Determinants of Health     Financial Resource Strain: Low Risk  (1/23/2024)    Financial Resource Strain     Med Affordability: No   Food Insecurity: No Food Insecurity (2/3/2024)    Food Insecurity     Food Insecurity: Never true   Transportation Needs: No Transportation Needs (2/3/2024)    Transportation Needs     Lack of Transportation: No   Housing Stability: Low Risk  (2/3/2024)    Housing Stability     Housing Instability: No          REVIEW OF SYSTEMS:   A comprehensive 10 point review of systems was completed.  Pertinent positives and negatives noted in the the HPI.      LABS:     Lab Results   Component Value Date     11/27/2022    A1C 5.9 (H) 11/27/2022     Lab Results   Component Value Date    WBC 5.4 04/23/2024    RBC 2.93 (L) 04/23/2024    HGB 8.8 (L) 04/23/2024    HCT 28.3 (L) 04/23/2024    MCV 96.6 04/23/2024    MCH 30.0 04/23/2024    MCHC 31.1 04/23/2024    RDW 20.6 (H) 04/23/2024    .0 04/23/2024     Lab Results   Component Value Date    GLU 76 04/23/2024    BUN 28 (H) 04/23/2024    BUNCREA 20.9 (H) 04/23/2024    CREATSERUM 1.34 (H) 04/23/2024    ANIONGAP 8 04/23/2024    CA 9.9 04/23/2024    OSMOCALC 286 04/23/2024    ALKPHO 147 (H) 02/12/2024     (H) 02/12/2024    ALT 29 02/12/2024    BILT 1.9 (H) 02/12/2024    TP 7.2 04/23/2024    ALB 4.0 04/23/2024    ALB 3.63 (L) 04/23/2024    GLOBULIN 2.8 02/12/2024     04/23/2024    K 4.8 04/23/2024     04/23/2024    CO2 25.0 04/23/2024     Lab Results   Component Value Date    PTP 16.5 (H) 02/09/2024    INR 1.25 (H) 02/09/2024     No results found for: \"VITD\", \"QVITD\", \"COXS23HO\"

## 2024-05-09 DIAGNOSIS — M51.36 LUMBAR DEGENERATIVE DISC DISEASE: ICD-10-CM

## 2024-05-09 DIAGNOSIS — M05.79 RHEUMATOID ARTHRITIS INVOLVING MULTIPLE SITES WITH POSITIVE RHEUMATOID FACTOR (HCC): ICD-10-CM

## 2024-05-09 DIAGNOSIS — M06.9 RHEUMATOID ARTHRITIS OF HAND, UNSPECIFIED LATERALITY, UNSPECIFIED WHETHER RHEUMATOID FACTOR PRESENT (HCC): ICD-10-CM

## 2024-05-09 DIAGNOSIS — M54.2 BILATERAL POSTERIOR NECK PAIN: ICD-10-CM

## 2024-05-09 NOTE — TELEPHONE ENCOUNTER
HOSPITALIST NURSE   TELEPHONE NOTE    Refill request received by the Hospitalist office for Pantoprazole tablets.  Hospitalist team unable to approve refills.   Request routed to PCPs triage staff.

## 2024-05-10 RX ORDER — HYDROCODONE BITARTRATE AND ACETAMINOPHEN 10; 325 MG/1; MG/1
1 TABLET ORAL EVERY 6 HOURS PRN
Qty: 60 TABLET | Refills: 0 | Status: SHIPPED | OUTPATIENT
Start: 2024-05-15

## 2024-05-10 RX ORDER — PANTOPRAZOLE SODIUM 40 MG/1
40 TABLET, DELAYED RELEASE ORAL
Qty: 60 TABLET | Refills: 0 | Status: SHIPPED | OUTPATIENT
Start: 2024-05-10 | End: 2024-05-13

## 2024-05-10 NOTE — TELEPHONE ENCOUNTER
Please review. Protocol Failed; No Protocol    Recent fills: 3/16/2024, 4/10/2024, 4/29/2024 Quantity 60 each- 15 days supply  Last Rx written: 4/28/2024   DUE: 5/15/2024  Last office visit: 8/21/2023    Future Appointments  Date Type Provider Dept   06/25/24 Appointment Johnathon Rodriguez DO [facility]   Showing future appointments within next 150 days with a meds authorizing provider and meeting all other requirements        Requested Prescriptions   Pending Prescriptions Disp Refills    HYDROcodone-acetaminophen (NORCO)  MG Oral Tab 60 tablet 0     Sig: Take 1 tablet by mouth every 6 (six) hours as needed for Pain.       Controlled Substance Medication Failed - 5/9/2024 10:40 AM        Failed - This medication is a controlled substance - forward to provider to refill               Future Appointments         Provider Department Appt Notes    In 1 month Johnathon Rodriguez DO [facility] and rehab stay          Recent Outpatient Visits              1 week ago [facility]ervical radiculopathy    [facility]    Office Visit    2 weeks ago Stage 3a chronic kidney disease (HCC)    [facility], [facility], [place] Eyad Arvizu MD    Office Visit    4 months ago Arthropathy of cervical facet joint    [facility], Felicitas Love,     Office Visit    8 months ago [facility] radicul[facility]    Office Visit    8 months ago Medicare annual wellness visit, initial    [facility] Johnathon Rodriguez, DO    Office Visit

## 2024-05-10 NOTE — TELEPHONE ENCOUNTER
Refill passed per [facility] protocol. However, rx not previously approved by Dr. Rodriguez. Please review.    Requested Prescriptions   Pending Prescriptions Disp Refills    pantoprazole 40 MG Oral Tab EC 60 tablet 0     Sig: Take 1 tablet (40 mg total) by mouth 2 (two) times daily before meals.       Gastrointestional Medication Protocol Passed - 5/9/2024  3:16 PM        Passed - In person appointment or virtual visit in the past 12 mos or appointment in next 3 mos     Recent Outpatient Visits              1 week ago Cervical radiculopathy    [facility] [address], [place] Felicitas Lee, DO    Office Visit    2 weeks ago Stage 3a chronic kidney disease (HCC)    [facility], [facility], [place] Eyad Arvizu MD    Office Visit    4 months ago [facility] of cervical facet joint    [facility]    [facility] Visit    8 months ago Cervical radiculopathy    [facility] [place]    Office Visit    8 months ago Medicare annual wellness visit, initial    [facility] Johnathon Rodriguez, DO    Office Visit          Future Appointments         Provider Department Appt Notes    In 1 month Johnathon Rodriguez DO [facility] and rehab stay

## 2024-05-13 RX ORDER — PANTOPRAZOLE SODIUM 40 MG/1
40 TABLET, DELAYED RELEASE ORAL
Qty: 180 TABLET | Refills: 0 | Status: SHIPPED | OUTPATIENT
Start: 2024-05-13

## 2024-05-15 ENCOUNTER — NURSE TRIAGE (OUTPATIENT)
Dept: FAMILY MEDICINE CLINIC | Facility: CLINIC | Age: 79
End: 2024-05-15

## 2024-05-15 ENCOUNTER — HOSPITAL ENCOUNTER (INPATIENT)
Facility: HOSPITAL | Age: 79
LOS: 3 days | Discharge: HOME HEALTH | DRG: 640 | End: 2024-05-18
Attending: EMERGENCY MEDICINE | Admitting: HOSPITALIST

## 2024-05-15 DIAGNOSIS — D50.9 IRON DEFICIENCY ANEMIA, UNSPECIFIED IRON DEFICIENCY ANEMIA TYPE: ICD-10-CM

## 2024-05-15 DIAGNOSIS — N17.9 ACUTE KIDNEY INJURY (HCC): Primary | ICD-10-CM

## 2024-05-15 DIAGNOSIS — R53.1 WEAKNESS GENERALIZED: ICD-10-CM

## 2024-05-15 PROBLEM — N28.9 ACUTE KIDNEY INSUFFICIENCY: Status: ACTIVE | Noted: 2024-01-01

## 2024-05-15 PROBLEM — N28.9 ACUTE KIDNEY INSUFFICIENCY: Status: ACTIVE | Noted: 2024-05-15

## 2024-05-15 LAB
ALBUMIN SERPL-MCNC: 3.9 G/DL (ref 3.2–4.8)
ALP LIVER SERPL-CCNC: 168 U/L
ALT SERPL-CCNC: 9 U/L
ANION GAP SERPL CALC-SCNC: 7 MMOL/L (ref 0–18)
AST SERPL-CCNC: 17 U/L (ref ?–34)
BASOPHILS # BLD AUTO: 0.02 X10(3) UL (ref 0–0.2)
BASOPHILS NFR BLD AUTO: 0.4 %
BILIRUB DIRECT SERPL-MCNC: 0.4 MG/DL (ref ?–0.3)
BILIRUB SERPL-MCNC: 0.5 MG/DL (ref 0.2–1.1)
BILIRUB UR QL: NEGATIVE
BUN BLD-MCNC: 52 MG/DL (ref 9–23)
BUN/CREAT SERPL: 22.8 (ref 10–20)
CALCIUM BLD-MCNC: 9.3 MG/DL (ref 8.7–10.4)
CHLORIDE SERPL-SCNC: 100 MMOL/L (ref 98–112)
CLARITY UR: CLEAR
CO2 SERPL-SCNC: 25 MMOL/L (ref 21–32)
CREAT BLD-MCNC: 2.28 MG/DL
DEPRECATED RDW RBC AUTO: 62.3 FL (ref 35.1–46.3)
EGFRCR SERPLBLD CKD-EPI 2021: 21 ML/MIN/1.73M2 (ref 60–?)
EOSINOPHIL # BLD AUTO: 0.19 X10(3) UL (ref 0–0.7)
EOSINOPHIL NFR BLD AUTO: 4.1 %
ERYTHROCYTE [DISTWIDTH] IN BLOOD BY AUTOMATED COUNT: 18 % (ref 11–15)
GLUCOSE BLD-MCNC: 92 MG/DL (ref 70–99)
GLUCOSE UR-MCNC: NORMAL MG/DL
HCT VFR BLD AUTO: 27.2 %
HGB BLD-MCNC: 8.4 G/DL
HGB UR QL STRIP.AUTO: NEGATIVE
IMM GRANULOCYTES # BLD AUTO: 0.02 X10(3) UL (ref 0–1)
IMM GRANULOCYTES NFR BLD: 0.4 %
KETONES UR-MCNC: NEGATIVE MG/DL
LEUKOCYTE ESTERASE UR QL STRIP.AUTO: NEGATIVE
LYMPHOCYTES # BLD AUTO: 0.85 X10(3) UL (ref 1–4)
LYMPHOCYTES NFR BLD AUTO: 18.3 %
MCH RBC QN AUTO: 30.4 PG (ref 26–34)
MCHC RBC AUTO-ENTMCNC: 30.9 G/DL (ref 31–37)
MCV RBC AUTO: 98.6 FL
MONOCYTES # BLD AUTO: 0.46 X10(3) UL (ref 0.1–1)
MONOCYTES NFR BLD AUTO: 9.9 %
NEUTROPHILS # BLD AUTO: 3.11 X10 (3) UL (ref 1.5–7.7)
NEUTROPHILS # BLD AUTO: 3.11 X10(3) UL (ref 1.5–7.7)
NEUTROPHILS NFR BLD AUTO: 66.9 %
NITRITE UR QL STRIP.AUTO: NEGATIVE
OSMOLALITY SERPL CALC.SUM OF ELEC: 288 MOSM/KG (ref 275–295)
PH UR: 6.5 [PH] (ref 5–8)
PLATELET # BLD AUTO: 200 10(3)UL (ref 150–450)
POTASSIUM SERPL-SCNC: 4.7 MMOL/L (ref 3.5–5.1)
PROT SERPL-MCNC: 7.5 G/DL (ref 5.7–8.2)
PROT UR-MCNC: NEGATIVE MG/DL
RBC # BLD AUTO: 2.76 X10(6)UL
SODIUM SERPL-SCNC: 132 MMOL/L (ref 136–145)
SP GR UR STRIP: 1.01 (ref 1–1.03)
UROBILINOGEN UR STRIP-ACNC: NORMAL
WBC # BLD AUTO: 4.7 X10(3) UL (ref 4–11)

## 2024-05-15 PROCEDURE — 99223 1ST HOSP IP/OBS HIGH 75: CPT | Performed by: HOSPITALIST

## 2024-05-15 RX ORDER — AMITRIPTYLINE HYDROCHLORIDE 25 MG/1
25 TABLET, FILM COATED ORAL NIGHTLY
Status: DISCONTINUED | OUTPATIENT
Start: 2024-05-15 | End: 2024-05-16

## 2024-05-15 RX ORDER — SODIUM CHLORIDE 9 MG/ML
1000 INJECTION, SOLUTION INTRAVENOUS ONCE
Status: COMPLETED | OUTPATIENT
Start: 2024-05-15 | End: 2024-05-15

## 2024-05-15 RX ORDER — METOCLOPRAMIDE HYDROCHLORIDE 5 MG/ML
5 INJECTION INTRAMUSCULAR; INTRAVENOUS EVERY 8 HOURS PRN
Status: DISCONTINUED | OUTPATIENT
Start: 2024-05-15 | End: 2024-05-16

## 2024-05-15 RX ORDER — ACETAMINOPHEN 500 MG
500 TABLET ORAL EVERY 4 HOURS PRN
Status: DISCONTINUED | OUTPATIENT
Start: 2024-05-15 | End: 2024-05-17

## 2024-05-15 RX ORDER — SODIUM CHLORIDE 9 MG/ML
INJECTION, SOLUTION INTRAVENOUS CONTINUOUS
Status: DISCONTINUED | OUTPATIENT
Start: 2024-05-15 | End: 2024-05-17

## 2024-05-15 RX ORDER — SODIUM CHLORIDE 9 MG/ML
INJECTION, SOLUTION INTRAVENOUS CONTINUOUS
Status: ACTIVE | OUTPATIENT
Start: 2024-05-15 | End: 2024-05-15

## 2024-05-15 RX ORDER — ONDANSETRON 2 MG/ML
4 INJECTION INTRAMUSCULAR; INTRAVENOUS EVERY 6 HOURS PRN
Status: DISCONTINUED | OUTPATIENT
Start: 2024-05-15 | End: 2024-05-16

## 2024-05-15 RX ORDER — HYDROCODONE BITARTRATE AND ACETAMINOPHEN 10; 325 MG/1; MG/1
1 TABLET ORAL EVERY 6 HOURS PRN
Status: DISCONTINUED | OUTPATIENT
Start: 2024-05-15 | End: 2024-05-18

## 2024-05-15 NOTE — TELEPHONE ENCOUNTER
Please reply to pool: EM RN TRIAGE  Action Requested: Summary for Provider     []  Critical Lab, Recommendations Needed  [] Need Additional Advice  [x]   DAMIAN    []   Need Orders  [] Need Medications Sent to Pharmacy  []  Other     SUMMARY: Received call from home physical therapist Veronica.  Patient complains of feeling dizzy and lightheaded.  Blood pressure checked, 87/56.  Patient now laying down and follow up reading 83/48.  Patient is on multiple blood pressure medications and does not check her blood pressure at home.  Nurse advised emergency room evaluation of hypotension.  Patient was notified and agrees with plan.  Declines 911 call, states her  can take her.  Flagged for follow up.  Dr. Michael SALGADO.    Reason for call: Hypotension  Onset: Data Unavailable                       Reason for Disposition   Systolic BP < 90 and feeling dizzy, lightheaded, or weak    Protocols used: Blood Pressure - Low-A-OH

## 2024-05-15 NOTE — ED INITIAL ASSESSMENT (HPI)
Patient to triage via wheelchair, c/o low blood pressure readings at home. Patient stated she was with her physical therapist when she got several low blood pressure readings at home. BP of 104/64 in triage. Patient endorses feeling lightheaded and dizzy.

## 2024-05-16 PROBLEM — E87.5 HYPERKALEMIA: Status: ACTIVE | Noted: 2024-01-01

## 2024-05-16 PROBLEM — E87.5 HYPERKALEMIA: Status: ACTIVE | Noted: 2024-05-16

## 2024-05-16 LAB
ANION GAP SERPL CALC-SCNC: 5 MMOL/L (ref 0–18)
ATRIAL RATE: 64 BPM
BASOPHILS # BLD AUTO: 0.03 X10(3) UL (ref 0–0.2)
BASOPHILS NFR BLD AUTO: 0.6 %
BUN BLD-MCNC: 51 MG/DL (ref 9–23)
BUN/CREAT SERPL: 24.2 (ref 10–20)
CALCIUM BLD-MCNC: 8.8 MG/DL (ref 8.7–10.4)
CHLORIDE SERPL-SCNC: 103 MMOL/L (ref 98–112)
CO2 SERPL-SCNC: 25 MMOL/L (ref 21–32)
CREAT BLD-MCNC: 2.11 MG/DL
DEPRECATED RDW RBC AUTO: 62.1 FL (ref 35.1–46.3)
EGFRCR SERPLBLD CKD-EPI 2021: 24 ML/MIN/1.73M2 (ref 60–?)
EOSINOPHIL # BLD AUTO: 0.16 X10(3) UL (ref 0–0.7)
EOSINOPHIL NFR BLD AUTO: 3.1 %
ERYTHROCYTE [DISTWIDTH] IN BLOOD BY AUTOMATED COUNT: 18 % (ref 11–15)
GLUCOSE BLD-MCNC: 100 MG/DL (ref 70–99)
HCT VFR BLD AUTO: 24 %
HGB BLD-MCNC: 7.5 G/DL
IMM GRANULOCYTES # BLD AUTO: 0.03 X10(3) UL (ref 0–1)
IMM GRANULOCYTES NFR BLD: 0.6 %
IRON SATN MFR SERPL: 23 %
IRON SERPL-MCNC: 51 UG/DL
LYMPHOCYTES # BLD AUTO: 0.79 X10(3) UL (ref 1–4)
LYMPHOCYTES NFR BLD AUTO: 15.2 %
MCH RBC QN AUTO: 30.6 PG (ref 26–34)
MCHC RBC AUTO-ENTMCNC: 31.3 G/DL (ref 31–37)
MCV RBC AUTO: 98 FL
MONOCYTES # BLD AUTO: 0.46 X10(3) UL (ref 0.1–1)
MONOCYTES NFR BLD AUTO: 8.8 %
NEUTROPHILS # BLD AUTO: 3.74 X10 (3) UL (ref 1.5–7.7)
NEUTROPHILS # BLD AUTO: 3.74 X10(3) UL (ref 1.5–7.7)
NEUTROPHILS NFR BLD AUTO: 71.7 %
OSMOLALITY SERPL CALC.SUM OF ELEC: 290 MOSM/KG (ref 275–295)
P AXIS: 62 DEGREES
P-R INTERVAL: 134 MS
PLATELET # BLD AUTO: 180 10(3)UL (ref 150–450)
POTASSIUM SERPL-SCNC: 5.7 MMOL/L (ref 3.5–5.1)
Q-T INTERVAL: 568 MS
QRS DURATION: 192 MS
QTC CALCULATION (BEZET): 585 MS
R AXIS: -85 DEGREES
RBC # BLD AUTO: 2.45 X10(6)UL
SODIUM SERPL-SCNC: 133 MMOL/L (ref 136–145)
T AXIS: 90 DEGREES
TIBC SERPL-MCNC: 219 UG/DL (ref 250–425)
TRANSFERRIN SERPL-MCNC: 147 MG/DL (ref 250–380)
VENTRICULAR RATE: 64 BPM
WBC # BLD AUTO: 5.2 X10(3) UL (ref 4–11)

## 2024-05-16 PROCEDURE — 99222 1ST HOSP IP/OBS MODERATE 55: CPT | Performed by: INTERNAL MEDICINE

## 2024-05-16 PROCEDURE — 99233 SBSQ HOSP IP/OBS HIGH 50: CPT | Performed by: HOSPITALIST

## 2024-05-16 RX ORDER — MELATONIN
800 DAILY
Status: DISCONTINUED | OUTPATIENT
Start: 2024-05-16 | End: 2024-05-18

## 2024-05-16 RX ORDER — MAGNESIUM OXIDE 400 MG (241.3 MG MAGNESIUM) TABLET
325 TABLET
Status: DISCONTINUED | OUTPATIENT
Start: 2024-05-16 | End: 2024-05-18

## 2024-05-16 RX ORDER — ALENDRONATE SODIUM 70 MG/1
70 TABLET ORAL WEEKLY
Status: DISCONTINUED | OUTPATIENT
Start: 2024-05-16 | End: 2024-05-16

## 2024-05-16 RX ORDER — METHOTREXATE 2.5 MG/1
15 TABLET ORAL WEEKLY
Status: DISCONTINUED | OUTPATIENT
Start: 2024-05-16 | End: 2024-05-16

## 2024-05-16 RX ORDER — PANTOPRAZOLE SODIUM 40 MG/1
40 TABLET, DELAYED RELEASE ORAL
Status: DISCONTINUED | OUTPATIENT
Start: 2024-05-16 | End: 2024-05-18

## 2024-05-16 RX ORDER — CARVEDILOL 3.12 MG/1
3.12 TABLET ORAL 2 TIMES DAILY WITH MEALS
Status: DISCONTINUED | OUTPATIENT
Start: 2024-05-16 | End: 2024-05-18

## 2024-05-16 RX ORDER — AMIODARONE HYDROCHLORIDE 200 MG/1
200 TABLET ORAL DAILY
Status: DISCONTINUED | OUTPATIENT
Start: 2024-05-16 | End: 2024-05-18

## 2024-05-16 RX ORDER — MIRTAZAPINE 7.5 MG/1
7.5 TABLET, FILM COATED ORAL NIGHTLY
Status: DISCONTINUED | OUTPATIENT
Start: 2024-05-16 | End: 2024-05-18

## 2024-05-16 NOTE — H&P
[facility]    PATIENT'S NAME: DORIS ALEMAN   ATTENDING PHYSICIAN: Azul Sahni MD   PATIENT ACCOUNT#:   [id number]    LOCATION:  [address] [place] [address]  MEDICAL RECORD #:   [id number]       YOB: 1945  ADMISSION DATE:       05/15/2024    HISTORY AND PHYSICAL EXAMINATION    CHIEF COMPLAINT:  Acute on chronic kidney injury, hypovolemia, and hypotension.    HISTORY OF PRESENT ILLNESS:  The patient is a 79-year-old  female with underlying nonischemic cardiomyopathy who came in today to the emergency department for evaluation of progressive weakness, fatigued, and lightheadedness, especially when she stands up.  She was noted to have systolic blood pressure of 99 upon arrival.  Chemistry showed GFR of 21 which is half of her baseline.  BUN and creatinine of 52 and 2.28.  CBC showed hemoglobin of 8.4 which has dropped from 10.4 in February 2024.  Rectal exam done by the emergency room physician was negative.  Urinalysis showed no evidence of urinary tract infection.  Patient was started on IV fluids, and she will be admitted to the hospital for further management.    PAST MEDICAL HISTORY:  Nonischemic cardiomyopathy, ejection fraction 10% to 15% status post ICD with moderate mitral regurgitation.  She had history of gastrointestinal bleed, possibly from arteriovenous malformations, was taken of Eliquis and she is being evaluated for Watchman device.  She had chronic kidney disease stage 3, hypertension, osteoarthritis, history of DVT, and osteoporosis, rheumatoid arthritis.     PAST SURGICAL HISTORY:  Bilateral total shoulder arthroplasty.    MEDICATIONS:  Please see medication reconciliation list.      FAMILY HISTORY:  Positive for hypertension.    SOCIAL HISTORY:  Ex-tobacco user.  No current tobacco, alcohol, or drug use.  Lives with her family.  Independent for basic activities of daily living.     REVIEW OF SYSTEMS:  Patient reports poor appetite in general and progressive  weakness, fatigue, lightheadedness, especially when she stands up.  She denies any melena or rectal bleed.  No abdominal pain.  Other 12-point review of systems is negative.       PHYSICAL EXAMINATION:    GENERAL:  Alert and oriented to time, place, and person.  Appears fatigued, malnourished, tired.   VITAL SIGNS:  Temperature 97.9, pulse 60, respiratory rate 12, blood pressure 94/60, pulse ox 100% on room air.    HEENT:  Atraumatic.  Oropharynx clear.  Moist mucous membranes.  Normal hard and soft palate.  Eyes:  Anicteric sclerae.  Pupils equal, round, reactive.  NECK:  Supple.  No lymphadenopathy.  Trachea midline.  Full range of motion.  LUNGS:  Clear to auscultation bilaterally.  Normal respiratory effort.    HEART:  Regular rate, rhythm.  S1 and S2 auscultated.   ABDOMEN:  Soft, nondistended.  No tenderness.  Positive bowel sounds.    EXTREMITIES:  No edema, clubbing, or cyanosis.  NEUROLOGIC:  Motor and sensory intact.      ASSESSMENT:    1.   Hypotension, hypovolemia with acute kidney injury secondary to polypharmacy and poor appetite.  2.   Progressive anemia.  Rule out recurrent gastrointestinal bleed.  Patient had negative rectal exam by the emergency room physician.  3.   Nonischemic cardiomyopathy.    PLAN:  Patient will be admitted to telemetry floor.  Hold her diuretics.  Start her on gentle hydration.  Monitor her kidney function and hemodynamic status.  Monitor hemoglobin.  Obtain cardiology consult for medication adjustment.  Also, patient is malnourished, probably need nutritional support and consultation.    Dictated By Azul Sahni MD  d: 05/15/2024 19:29:18  t: 05/15/2024 21:33:01  [facility] [id number]  /

## 2024-05-16 NOTE — CONSULTS
[facility]  part of [facility]    Nephrology Consult Note    Date of Admission:  5/15/2024  Date of Consult:  5/16/2024   Reason for Consultation:   GEORGE on CKD 3    History of Present Illness:   Patient is a 79 year old female with past medical history of HTN, CKD 3b, HFrEF (EF 15-20%), s/p AICD, A.fib, s/p bioprosthetic MVR, RA, and h/o GIB/AVMs, who presented with fatigue, dizziness and hypotension. She also complains of loose stools x 2 days, and nausea since yesterday. She reports decreased appetite over several months. She has lost about 20 pounds over 1 year. She was admitted in Feb 2024 with CHF exacerbation and GEORGE. Was discharged to rehab. She was taking Enteresto/Hydralazine/Coreg/Isordil at home. Does not take diuretics. Denies NSAIDs use.     On admission, she was found to have BP of 90s-100s/50s. Labs showed Cr of 2.28 and improved to 2.11 today. Previous Cr 1.34 4/23/24. She had an GEORGE in Feb 2024 with Cr peaking at 1.68.       Past Medical History  Past Medical History:    Acute kidney insufficiency    Anemia    Arrhythmia    CHF (congestive heart failure) (HCC)    Deep vein thrombosis (HCC)    Essential hypertension    History of blood transfusion    Rheumatoid arthritis (HCC)    Seizure disorder (HCC)       Past Surgical History  Past Surgical History:   Procedure Laterality Date    Colonoscopy N/A 01/17/2024    Dr. Rios    Colonoscopy N/A 1/17/2024    Procedure: COLONOSCOPY;  Surgeon: Zoraida Rios MD;  Location: [facility] ENDOSCOPY    Egd N/A 01/17/2024    Dr. Rios    Other surgical history  2017    Heart Surgery     Other surgical history  2020    Bilateral shoulder replacement        Family History  History reviewed. No pertinent family history.    Social History  Pediatric History   Patient Parents    Not on file     Other Topics Concern    Not on file   Social History Narrative    Not on file           Current Medications:  Current Facility-Administered Medications   Medication  Dose Route Frequency    amiodarone (Pacerone) tab 200 mg  200 mg Oral Daily    carvedilol (Coreg) tab 3.125 mg  3.125 mg Oral BID with meals    mirtazapine (Remeron) tab 7.5 mg  7.5 mg Oral Nightly    ferrous sulfate DR tab 325 mg  325 mg Oral Daily with breakfast    folic acid (Folvite) tab 800 mcg  800 mcg Oral Daily    pantoprazole (Protonix) DR tab 40 mg  40 mg Oral BID AC    sodium chloride 0.9% infusion   Intravenous Continuous    acetaminophen (Tylenol Extra Strength) tab 500 mg  500 mg Oral Q4H PRN    HYDROcodone-acetaminophen (Norco)  MG per tab 1 tablet  1 tablet Oral Q6H PRN       Allergies  Allergies   Allergen Reactions    Lisinopril Coughing       Review of Systems:   Review of Systems   Constitutional:  Positive for appetite change, fatigue and unexpected weight change. Negative for chills and fever.   HENT:  Negative for ear pain, rhinorrhea, sore throat and trouble swallowing.    Eyes:  Negative for pain and discharge.   Respiratory:  Positive for shortness of breath (chronic). Negative for cough and chest tightness.    Cardiovascular:  Negative for chest pain and leg swelling.   Gastrointestinal:  Negative for abdominal pain, constipation, diarrhea and vomiting.   Genitourinary:  Negative for decreased urine volume, dysuria and flank pain.   Musculoskeletal:  Positive for arthralgias. Negative for back pain, gait problem and myalgias.   Skin:  Negative for rash.   Neurological:  Negative for dizziness, speech difficulty, weakness, numbness and headaches.   Psychiatric/Behavioral:  Negative for agitation and confusion.        Physical Exam:   Height: --  Weight: 129 lb 12.8 oz (58.9 kg) (05/16 0500)  BSA (Calculated - sq m): --  Pulse: 61 (05/16 1400)  BP: 91/50 (05/16 1400)  Temp: 98.7 °F (37.1 °C) (05/16 1400)  Do Not Use - Resp Rate: --  SpO2: 97 % (05/16 1400)  Temp:  [97.8 °F (36.6 °C)-98.7 °F (37.1 °C)] 98.7 °F (37.1 °C)  Pulse:  [58-62] 61  Resp:  [12-18] 18  BP: ()/(45-67)  91/50  SpO2:  [97 %-100 %] 97 %  SpO2: 97 %     Intake/Output Summary (Last 24 hours) at 5/16/2024 1710  Last data filed at 5/16/2024 0900  Gross per 24 hour   Intake 1180 ml   Output 200 ml   Net 980 ml     Wt Readings from Last 5 Encounters:   05/16/24 129 lb 12.8 oz (58.9 kg)   05/02/24 126 lb (57.2 kg)   04/23/24 126 lb (57.2 kg)   04/11/24 126 lb (57.2 kg)   02/26/24 137 lb (62.1 kg)       Physical Exam    Results:     Laboratory Data:  Recent Labs   Lab 05/15/24  1732 05/16/24  0535   RBC 2.76* 2.45*   HGB 8.4* 7.5*   HCT 27.2* 24.0*   MCV 98.6 98.0   NEPRELIM 3.11 3.74   WBC 4.7 5.2   .0 180.0     Recent Labs   Lab 05/15/24  1732 05/16/24  0535   GLU 92 100*   BUN 52* 51*   CREATSERUM 2.28* 2.11*   CA 9.3 8.8   ALB 3.9  --    * 133*   K 4.7 5.7*    103   CO2 25.0 25.0   ALKPHO 168*  --    AST 17  --    ALT 9*  --    BILT 0.5  --    TP 7.5  --      PTT   Date Value Ref Range Status   02/09/2024 35.2 23.3 - 35.6 seconds Final     INR   Date Value Ref Range Status   02/09/2024 1.25 (H) 0.80 - 1.20 Final     Comment:     Only the INR (not the PT value) should be utilized for   the monitoring of oral anticoagulant therapy.     Recommended therapeutic ranges for anticoagulant therapy are   as follows:   2.0 - 3.0 All indications except for mechanical prosthetic   cardiac valves.     2.5 - 3.5 Mechanical prosthetic cardiac valves.       No results for input(s): \"BNP\" in the last 168 hours.  Lab Results   Component Value Date    COLORUR Light-Yellow 05/15/2024    CLARITY Clear 05/15/2024    SPECGRAVITY 1.010 05/15/2024    GLUUR Normal 05/15/2024    BILUR Negative 05/15/2024    KETUR Negative 05/15/2024    BLOODURINE Negative 05/15/2024    PHURINE 6.5 05/15/2024    PROUR Negative 05/15/2024    UROBILINOGEN Normal 05/15/2024    NITRITE Negative 05/15/2024    LEUUR Negative 05/15/2024         Impression and Recommendations:   Patient is a 79 year old female with past medical history of HTN, CKD 3b,  HFrEF (EF 15-20%), s/p AICD, A.fib, s/p bioprosthetic MVR, RA, and h/o GIB/AVMs, who presented with fatigue, dizziness and hypotension.     She is found to have an GEORGE. Likely due to ischemic ATN from hypotension.   Agree with holding Enteresto/Hydralazine/Isordil. Will likely need to stop some of these meds.   Cr improved to 2.11 today.   Will decrease NS to 50 ml/hr.   Monitor I/Os.     Hyperkalemia:   Potassium 5.7 today.   Cont NS.     CHF:   Hold meds as above.   Cards following.     Anemia:   Hb decreased to 7.5 today. Iron sat 23%.   Will need Epo.      Thank you for allowing me to participate in the care of your patient.      Gagan Mejia MD  Staff Nephrologist  5/16/2024

## 2024-05-16 NOTE — PROGRESS NOTES
[facility]  part of [facility]    Progress Note    Margaret Silverio Patient Status:  Inpatient    3/14/1945 MRN [id number]   Location [facility] 3W/SW Attending Rosalinda Barry DO   Hosp Day # 1 PCP Johnathon Rodriguez DO     Chief complaint weakness, sob     Subjective:   Margaret Silverio is a(n) 79 year old female Pt seen today and reports her bp was low during PT at home and was advised to come in. She does report dizziness with it and reports sob with short distances. She denies cp. She reports no appetite for a long time. Had cscope in  with avm's. No recent bloody stools     ROS:     No c/d   No n/v     Objective:   Blood pressure 112/66, pulse 60, temperature 98.7 °F (37.1 °C), temperature source Oral, resp. rate 16, weight 129 lb 12.8 oz (58.9 kg), SpO2 100%.      Intake/Output Summary (Last 24 hours) at 2024 1038  Last data filed at 2024 0700  Gross per 24 hour   Intake 940 ml   Output 200 ml   Net 740 ml       Patient Weight(s) for the past 336 hrs:   Weight   24 0500 129 lb 12.8 oz (58.9 kg)           General appearance: alert, appears stated age and cooperative  Pulmonary:  clear to auscultation bilaterally  Cardiovascular: S1, S2 normal, no murmur, click, rub or gallop, regular rate and rhythm  Abdominal: soft, non-tender; bowel sounds normal; no masses,  no organomegaly  Extremities: extremities normal, atraumatic, no cyanosis or edema        Medicines:     Current Facility-Administered Medications   Medication Dose Route Frequency    amiodarone (Pacerone) tab 200 mg  200 mg Oral Daily    carvedilol (Coreg) tab 3.125 mg  3.125 mg Oral BID with meals    mirtazapine (Remeron) tab 7.5 mg  7.5 mg Oral Nightly    ferrous sulfate DR tab 325 mg  325 mg Oral Daily with breakfast    folic acid (Folvite) tab 800 mcg  800 mcg Oral Daily    pantoprazole (Protonix) DR tab 40 mg  40 mg Oral BID AC    sodium chloride 0.9% infusion   Intravenous Continuous     acetaminophen (Tylenol Extra Strength) tab 500 mg  500 mg Oral Q4H PRN    HYDROcodone-acetaminophen (Norco)  MG per tab 1 tablet  1 tablet Oral Q6H PRN       Lab Results   Component Value Date    WBC 5.2 05/16/2024    HGB 7.5 (L) 05/16/2024    HCT 24.0 (L) 05/16/2024    .0 05/16/2024    CREATSERUM 2.11 (H) 05/16/2024    BUN 51 (H) 05/16/2024     (L) 05/16/2024    K 5.7 (H) 05/16/2024     05/16/2024    CO2 25.0 05/16/2024     (H) 05/16/2024    CA 8.8 05/16/2024    ALB 3.9 05/15/2024    ALKPHO 168 (H) 05/15/2024    BILT 0.5 05/15/2024    TP 7.5 05/15/2024    AST 17 05/15/2024    ALT 9 (L) 05/15/2024    PTT 35.2 02/09/2024    INR 1.25 (H) 02/09/2024    T4F 1.2 02/03/2024    TSH 10.681 (H) 02/03/2024    LIP 32 01/13/2024    ESRML 61 (H) 04/23/2024    CRP 5.10 (H) 04/23/2024    MG 1.8 02/18/2024    PHOS 4.2 04/23/2024    B12 >2,000 (H) 02/03/2024       No results found.  EKG 12 Lead    Result Date: 5/16/2024  Atrial-sensed ventricular-paced rhythm Abnormal ECG When compared with ECG of 02-FEB-2024 20:55, Previous ECG has undetermined rhythm, needs review Confirmed by RAAD MORENO, CALVO ([id number]) on 5/16/2024 8:34:44 AM     Results:     CBC:    Lab Results   Component Value Date    WBC 5.2 05/16/2024    WBC 4.7 05/15/2024    WBC 5.4 04/23/2024     Lab Results   Component Value Date    HGB 7.5 (L) 05/16/2024    HGB 8.4 (L) 05/15/2024    HGB 8.8 (L) 04/23/2024      Lab Results   Component Value Date    .0 05/16/2024    .0 05/15/2024    .0 04/23/2024       Recent Labs   Lab 05/15/24  1732 05/16/24  0535   GLU 92 100*   BUN 52* 51*   CREATSERUM 2.28* 2.11*   CA 9.3 8.8   * 133*   K 4.7 5.7*    103   CO2 25.0 25.0             Assessment and Plan:           ASSESSMENT:    1.       Hypotension, hypovolemia with acute kidney injury secondary to polypharmacy and poor appetite.  - dizziness improved with ivfs. Cont for now.   - K 5.7 - order kayexalate and repeat in am ;  consult renal   - monitor on tele   - hold anti-htns     2.       Progressive anemia.  Rule out recurrent gastrointestinal bleed.  Patient had negative rectal exam by the emergency room physician.  - h/o chronic anemia and avm's found on last c scope in Jan   - check iron studies ; may be candidate for iv iron   - monitor stools for blood     3.       Nonischemic cardiomyopathy with EF 15% , h/o afib with plans for watchman. Off eliquis. Also with pacer   - cards consulted   - strict I/o and daily wts   - monitor closely   - last echo in feb with EF 15%, diastolic failure, mitral valve bioprosethesis, TR wide open.     4. Malnutrition - added remeron . Dietician to see     Rosalinda Barry DO         Chart reviewed, including current vitals, notes, labs and imaging  Labs ordered and medications adjusted as outlined above  Coordinate care with care team/consultants  Discussed with patient results of tests, management plan as outlined above, and the need for ongoing hospitalization  D/w RN     [facility]        5/16/2024     **Certification      PHYSICIAN Certification of Need for Inpatient Hospitalization - Initial Certification    Patient will require inpatient services that will reasonably be expected to span two midnight's based on the clinical documentation in H+P.   Based on patients current state of illness, I anticipate that, after discharge, patient will require TBD.

## 2024-05-16 NOTE — ED PROVIDER NOTES
Patient Seen in: [facility] Emergency Department      History     Chief Complaint   Patient presents with    Hypotension     Stated Complaint: low blood pressure,sent by pcp    Subjective:   HPI    The patient is a 79-year-old female with a history of anemia, hypertension who presents with generalized weakness, lightheadedness with standing low blood pressures over the last few weeks.  Physical therapy came today and her blood pressure was low so she was sent to the ER.  Patient states she was hospitalized for pneumonia and then sent to rehab for the first few months of this year.  She has been home for the last 2 months but had lost a lot of weight and became severely deconditioned.  She still requiring physical therapy and still feels weak.  Family states that she barely eats.  While in rehab her blood pressure was low and they held her blood pressure medications but restarted them when she was discharged.  She has not seen her doctor since discharge from rehab.  She denies black stools or bleeding.  She denies chest pain or shortness of breath.  No fevers or chills.  She had some mild nausea today but otherwise no vomiting or significant diarrhea    Objective:   Past Medical History:    Anemia    Arrhythmia    Arthritis    Deep vein thrombosis (HCC)    Essential hypertension    High blood pressure    History of blood transfusion    Seizure disorder (HCC)    Seizures (HCC)              Past Surgical History:   Procedure Laterality Date    Colonoscopy N/A 01/17/2024    Dr. Rios    Colonoscopy N/A 1/17/2024    Procedure: COLONOSCOPY;  Surgeon: Zoraida Rios MD;  Location[facility] ENDOSCOPY    Egd N/A 01/17/2024    Dr. Rios    Other surgical history  2017    Heart Surgery     Other surgical history  2020    Bilateral shoulder replacement                 Social History     Socioeconomic History    Marital status:    Tobacco Use    Smoking status: Former     Current packs/day: 0.00     Types: Cigarettes      Quit date: 2014     Years since quitting: 10.3    Smokeless tobacco: Never   Vaping Use    Vaping status: Never Used   Substance and Sexual Activity    Alcohol use: Never    Drug use: Never     Social Determinants of Health     Financial Resource Strain: Low Risk  (1/23/2024)    Financial Resource Strain     Med Affordability: No   Food Insecurity: No Food Insecurity (2/3/2024)    Food Insecurity     Food Insecurity: Never true   Transportation Needs: No Transportation Needs (2/3/2024)    Transportation Needs     Lack of Transportation: No   Housing Stability: Low Risk  (2/3/2024)    Housing Stability     Housing Instability: No              Review of Systems    Positive for stated complaint: low blood pressure,sent by pcp  Other systems are as noted in HPI.  Constitutional and vital signs reviewed.      All other systems reviewed and negative except as noted above.    Physical Exam     ED Triage Vitals [05/15/24 1546]   /64   Pulse 65   Resp 20   Temp 97.9 °F (36.6 °C)   Temp src Temporal   SpO2 99 %   O2 Device None (Room air)       Current Vitals:   Vital Signs  BP: 111/59  Pulse: 59  Resp: 14  Temp: 97.9 °F (36.6 °C)  Temp src: Temporal  MAP (mmHg): 75    Oxygen Therapy  SpO2: 99 %  O2 Device: None (Room air)            Physical Exam  Vitals and nursing note reviewed.   Constitutional:       General: She is not in acute distress.     Appearance: Normal appearance. She is well-developed and underweight. She is not ill-appearing.   HENT:      Head: Normocephalic and atraumatic.      Mouth/Throat:      Mouth: Mucous membranes are moist.   Eyes:      Conjunctiva/sclera: Conjunctivae normal.      Pupils: Pupils are equal, round, and reactive to light.   Neck:      Vascular: No JVD.   Cardiovascular:      Rate and Rhythm: Normal rate and regular rhythm.      Heart sounds: Normal heart sounds. No murmur heard.  Pulmonary:      Effort: Pulmonary effort is normal.      Breath sounds: Normal breath sounds.    Abdominal:      General: Bowel sounds are normal. There is no distension.      Palpations: Abdomen is soft. There is no mass.      Tenderness: There is no abdominal tenderness. There is no guarding or rebound.   Musculoskeletal:         General: Normal range of motion.      Cervical back: Normal range of motion and neck supple.      Right lower leg: No edema.      Left lower leg: No edema.   Skin:     General: Skin is warm and dry.      Capillary Refill: Capillary refill takes less than 2 seconds.      Findings: No rash.   Neurological:      General: No focal deficit present.      Mental Status: She is alert and oriented to person, place, and time.      Deep Tendon Reflexes: Reflexes are normal and symmetric.   Psychiatric:         Judgment: Judgment normal.       Differential diagnosis includes dehydration, electrolyte abnormality, anemia, infection, arrhythmia, overmedication        ED Course     Labs Reviewed   BASIC METABOLIC PANEL (8) - Abnormal; Notable for the following components:       Result Value    Sodium 132 (*)     BUN 52 (*)     Creatinine 2.28 (*)     BUN/CREA Ratio 22.8 (*)     eGFR-Cr 21 (*)     All other components within normal limits   HEPATIC FUNCTION PANEL (7) - Abnormal; Notable for the following components:    ALT 9 (*)     Alkaline Phosphatase 168 (*)     Bilirubin, Direct 0.4 (*)     All other components within normal limits   CBC W/ DIFFERENTIAL - Abnormal; Notable for the following components:    RBC 2.76 (*)     HGB 8.4 (*)     HCT 27.2 (*)     MCHC 30.9 (*)     RDW-SD 62.3 (*)     RDW 18.0 (*)     Lymphocyte Absolute 0.85 (*)     All other components within normal limits   CBC WITH DIFFERENTIAL WITH PLATELET    Narrative:     The following orders were created for panel order CBC With Differential With Platelet.  Procedure                               Abnormality         Status                     ---------                               -----------         ------                      CBC W/ DIFFERENTIAL[[id number]]          Abnormal            Final result                 Please view results for these tests on the individual orders.   URINALYSIS, ROUTINE     EKG    Rate, intervals and axes as noted on EKG Report.  Rate: 64  Rhythm: Paced rhythm  Reading: Paced rhythm                          MDM      Pulse ox 99% on room air, normal  Admission disposition: 5/15/2024  7:21 PM                                        Medical Decision Making  Patient with generalized weakness with recurrent anemia 3 points from last blood draw 2 months ago, worsening kidney function  May also be overmedicated with her blood pressure medications in light of deconditioning and weight loss from hospitalization.  Will admit for IV fluids and further workup of anemia  Case discussed with Dr. Sahni  Vital signs stable prior to admission  Cardiology on consult    Problems Addressed:  Acute kidney injury (HCC): acute illness or injury  Iron deficiency anemia, unspecified iron deficiency anemia type: acute illness or injury  Weakness generalized: acute illness or injury    Amount and/or Complexity of Data Reviewed  Independent Historian:      Details: Family assisting with history  External Data Reviewed: radiology.     Details: From baseline 1 month ago reviewed with worsening kidney function  Labs: ordered.  ECG/medicine tests: ordered and independent interpretation performed. Decision-making details documented in ED Course.  Discussion of management or test interpretation with external provider(s): Case discussed with Dr. Sahni and [facility] cardiology    Risk  Decision regarding hospitalization.        Disposition and Plan     Clinical Impression:  1. Acute kidney injury (HCC)    2. Iron deficiency anemia, unspecified iron deficiency anemia type    3. Weakness generalized         Disposition:  Admit  5/15/2024  7:21 pm    Follow-up:  No follow-up provider specified.  We recommend that you schedule follow up care with a  primary care provider within the next three months to obtain basic health screening including reassessment of your blood pressure.      Medications Prescribed:  Current Discharge Medication List                            Hospital Problems       Present on Admission  Date Reviewed: 5/2/2024            ICD-10-CM Noted POA    * (Principal) Acute kidney injury (HCC) N17.9 5/15/2024 Unknown

## 2024-05-16 NOTE — DIETARY NOTE
ADULT NUTRITION INITIAL ASSESSMENT    Pt is at moderate nutrition risk.  Pt meets moderate malnutrition criteria.      CRITERIA FOR MALNUTRITION DIAGNOSIS:  Criteria for non-severe malnutrition diagnosis: acute illness/injury related to wt loss 7.5% in 3 months, energy intake less than 75% for greater than 7 days, and muscle mass moderate depletion .    RECOMMENDATIONS TO MD: See Nutrition Intervention    ADMITTING DIAGNOSIS:  Weakness generalized [R53.1]  Acute kidney injury (HCC) [N17.9]  Iron deficiency anemia, unspecified iron deficiency anemia type [D50.9]  PERTINENT PAST MEDICAL HISTORY:   Past Medical History:    Acute kidney insufficiency    Anemia    Arrhythmia    Arthritis    Deep vein thrombosis (HCC)    Essential hypertension    High blood pressure    History of blood transfusion    Seizure disorder (HCC)    Seizures (HCC)     PATIENT STATUS: Initial 05/16/24: Pt admit for weakness, GEORGE, iron deficiency anemia. PMH seen above. Pt assessed due to screening at risk for MST score of 2 for weight loss and declined PO.  Pt known to dietary from past admission in February. At this time, pt's weight was relatively stable. Since then pt reports she has lost ~20# since then. Per EMR wt hx review, pt noted to have ~12% weight loss x 3 months. Pt discharged from [facility] in February to rehab, then went home. She states she has had no appetite since being home, basically eating nothing at meal times. She says she was drinking 1-3 Ensures per day as able. Pt's potassium currently elevated, so holding off on Ensure for now- instead pt agreeable to Magic Cup BID. (Would like strawberry Ensure when able). Kayexalate ordered per MD chart note, will check labs in the AM. Pt is motivated to get her appetite up and trying to eat more. States she does not want to lose anymore weight.     FOOD/NUTRITION RELATED HISTORY:  Appetite: Poor PTA  Intake:  poor  Intake Meeting Needs: No, but oral nutrition supplements (ONS) to  maximize  Percent Meals Eaten (last 6 days)       Date/Time Percent Meals Eaten (%)    05/16/24 0900 100 %            Food Allergies: No Known Food Allergies (NKFA)  Cultural/Ethnic/Jewish Preferences: None    GASTROINTESTINAL: Loss of appetite    MEDICATIONS: reviewed   amiodarone  200 mg Oral Daily    carvedilol  3.125 mg Oral BID with meals    mirtazapine  7.5 mg Oral Nightly    ferrous sulfate  325 mg Oral Daily with breakfast    folic acid  800 mcg Oral Daily    pantoprazole  40 mg Oral BID AC     LABS: reviewed; hyperkalemia, hyponatremia  Recent Labs     05/15/24  1732 05/16/24  0535   GLU 92 100*   BUN 52* 51*   CREATSERUM 2.28* 2.11*   CA 9.3 8.8   * 133*   K 4.7 5.7*    103   CO2 25.0 25.0   OSMOCALC 288 290       NUTRITION RELATED PHYSICAL FINDINGS:  - Nutrition Focused Physical Exam (NFPE): mild depletion muscle mass temple region and moderate depletion muscle mass clavicle region  - Fluid Accumulation: none  ---> See RN documentation for details  - Skin Integrity: intact ---> See RN documentation for details    ANTHROPOMETRICS:  HT:  5' 4''  WT: 58.9 kg (129 lb 12.8 oz)   BMI: Body mass index is 22.28 kg/m².  BMI CLASSIFICATION: 19-24.9 kg/m2 - WNL  IBW: 120 lbs        107% IBW  Usual Body Wt: ~147-149 lbs per EMR      ~87% UBW    WEIGHT HISTORY:  Patient Weight(s) for the past 336 hrs:   Weight   05/16/24 0500 58.9 kg (129 lb 12.8 oz)     Wt Readings from Last 10 Encounters:   05/16/24 58.9 kg (129 lb 12.8 oz)   05/02/24 57.2 kg (126 lb)   04/23/24 57.2 kg (126 lb)   04/11/24 57.2 kg (126 lb)   02/26/24 62.1 kg (137 lb)   02/19/24 62.1 kg (137 lb)   02/18/24 62.2 kg (137 lb 3.2 oz)   01/20/24 69.7 kg (153 lb 11.2 oz)   12/26/23 65.8 kg (145 lb)   09/21/23 65.8 kg (145 lb)     NUTRITION DIAGNOSIS/PROBLEM:   Moderate Malnutrition related to Acute - Physiological causes resulting in anorexia or diminished intake as evidenced by wt loss 7.5% in 3 months, energy intake less than 75% for  greater than 7 days, and muscle mass moderate depletion.    NUTRITION INTERVENTION:     NUTRITION PRESCRIPTION:   Estimated Nutrition needs: --dosing wt of 58.9 kg - wt taken on 5/16  Calories: 0592-8797 calories/day (25-30 calories per kg Dosing wt)  Protein: 70-82 g protein/day (1.2-1.4 g protein/kg Dosing wt)  Fluid Needs: 1ml/kcal    - Diet:       Procedures    Regular/General diet Is Patient on Accuchecks? No      - RD Malnutrition Care Plan: Liberalized diet, Encouraged increased PO intake, Encouraged small frequent meals with emphasis on high calorie/high protein, and Initiated ONS (oral nutritional supplements) (removed cardiac diet restriction)  - Meals and snacks: Liberalized diet and Encouraged increased PO intake  - Medical Food Supplements-RD added Magic Cup (290 calories/ 9 g protein each) BID Vanilla and mixed berry. Rational/use of oral supplements discussed.  - Vitamin and mineral supplements: folic acid and iron  - Feeding assistance: meal set up  - Nutrition education: Discussed importance of adequate energy and protein intake     - Coordination of nutrition care: collaboration with other providers  - Discharge and transfer of nutrition care to new setting or provider: monitor plans    MONITOR AND EVALUATE/NUTRITION GOALS:  - Food and Nutrient Intake:      Monitor: adequacy of PO intake and adequacy of supplement intake  - Food and Nutrient Administration:      Monitor: N/A  - Anthropometric Measurement:    Monitor weight  - Nutrition Goals:      halt wt loss, maintain wt within 5%, PO and supplement greater than 75% of needs, labs within acceptable limits, and minimize lean body mass loss    DIETITIAN FOLLOW UP: RD to follow and monitor nutrition status       Sydney Edwards RD, LDN  Clinical Dietitian  P: [phone]

## 2024-05-16 NOTE — DISCHARGE INSTRUCTIONS
Home Health:  [facility] [place]  [address], [place] [address]  Phone: [phone]  Fax: [phone]

## 2024-05-16 NOTE — PROGRESS NOTES
Pt admitted from ED in bed with IVFs infusing. Admission assessment done. Pt reported the meds she takes at night but some of the meds she can not remember the dosages or exact name. Spouse Clifton called and instructed to bring medication bottles for verification this morning. Cardiac APN reviewed pt's meds and added parameter on Coreg and held other cardiac meds for now. Continued IV hydration.

## 2024-05-16 NOTE — CM/SW NOTE
05/16/24 0900   CM/SW Referral Data   Referral Source Social Work (self-referral)   Reason for Referral Discharge planning   Informant Patient;Spouse/Significant Other   Medical Hx   Does patient have an established PCP? Yes  (Johnathon Rodriguez)   Patient Info   Patient's Current Mental Status at Time of Assessment Alert;Oriented   Patient's Home Environment House   Number of Levels in Home 1   Number of Stair in Home 0   Patient lives with Spouse/Significant other  (great-grandson (17yrs old) & great-granddtr (12yrs old))   Patient Status Prior to Admission   Independent with ADLs and Mobility No   Pt. requires assistance with Housework;Driving   Services in place prior to admission DME/Supplies at home;Home Health Care   Home Health Provider Info [facility]   Type of DME/Supplies Straight Cane;Standard Walker   Discharge Needs   Anticipated D/C needs Home health care   Choice of Post-Acute Provider   Informed patient of right to choose their preferred provider Yes   List of appropriate post-acute services provided to patient/family with quality data No - Declined list   Patient/family choice [facility]       SW self referred pt for DC Planning.     Met w/ pt and  Luis Fernando at bedside. Above assessment completed.    Confirmed pt lives w/ Luis Fernando and their great -grandchildren (17yr old g-grandson & 12 yr old g-granddtr). Pt reports having a single level home w/ 0 steps to enter.    Pt has a cane and walker at home but rarely uses them. Pt's  drives and cooks. Pt's great-granddtr helps out as well.    Per chart, pt has Hx w/ [facility] at [facility] in February 2024. At that time, pt was also arranged w/ [facility] - pending if still active w/ them.    Pt did confirm she has a HH RN and PT via [facility].     Referral sent to [facility] via [facility]. TIERNEY order entered/sent.      PLAN: Home w/ [facility] - pending med clear      SW/CM to remain available for support and/or discharge planning.          Juliana Rodrigez, MSW, LSW [id number]

## 2024-05-16 NOTE — PROGRESS NOTES
Margaret Silverio Patient Status:  Inpatient    3/14/1945 MRN [id number]   Location [facility] 3W/SW Attending zAul Sahni MD   Hosp Day # 1 PCP Johnathon Rodriguez,      Cardiology Nocturnal APN Note    Briefly: (Documentation from chart review)     Margaret Silverio is a 78 y/o female with PMH/PSH of CAD, AF, HFrEF, HTN, CKD, who presented with progressive weakness and fatigue. Anemic with Hgb 8.4. Borderline SBP.    Primary Cardiologist Elvi/Arie    Vital Signs:       5/15/2024     9:20 PM 2024    12:14 AM   Vitals History   /58    BP Location Right arm    Pulse 60 61   Resp 16    Temp 98.5 °F (36.9 °C)    SpO2 98 %         Labs:   Lab Results   Component Value Date    WBC 4.7 05/15/2024    HGB 8.4 05/15/2024    HCT 27.2 05/15/2024    .0 05/15/2024    CREATSERUM 2.28 05/15/2024    BUN 52 05/15/2024     05/15/2024    K 4.7 05/15/2024     05/15/2024    CO2 25.0 05/15/2024    GLU 92 05/15/2024    CA 9.3 05/15/2024    ALB 3.9 05/15/2024    ALKPHO 168 05/15/2024    BILT 0.5 05/15/2024    TP 7.5 05/15/2024    AST 17 05/15/2024    ALT 9 05/15/2024       Diagnostics:         Allergies:  Allergies   Allergen Reactions    Lisinopril Coughing       Medications:    amiodarone    carvedilol    sodium chloride    acetaminophen    ondansetron    metoclopramide    amitriptyline    HYDROcodone-acetaminophen    Assessment:   Progressive weakness  - SBP 99 on arrival  - GEORGE, now on IVF    HFrEF  - LVEF 15-20% by echo   - [place] dual chamber ICD in situ  - On Entresto, coreg, isosorbide, and hydralazine outpatient    PAF  - Not on OAC s/p surgical BRANDYN  - On amiodarone      Plan:  - Continue amiodarone 200mg daily  - Continue Coreg 3.125mg BID, hold for SBP <100  - Hold Entresto with GEORGE  - Hold Isosorbide dinitrate and hydralazine with borderline SBP on arrival  - Agree with jimbo hydration  - Avoid adding QT prolonging agents. Currently on amiodarone and amitriptyline  with   - Continue to monitor overnight  - Formal Cardiology consult to follow in AM.       KODI Jimenez  [facility]  5/16/2024  12:42 AM

## 2024-05-16 NOTE — CONSULTS
Cardiology Consult Note    Margaret Silverio Patient Status:  Inpatient    3/14/1945 MRN [id number]   Location [facility] 3W/SW Attending Rosalinda Barry DO   Hosp Day # 1 PCP Johnathon Rodriguez,      HPI.  Margaret Silverio is a 79 year old female with history of hypertension, rheumatoid arthritis with deformities, paroxysmal A-fib, status post MVR-tissue, nonischemic cardiomyopathy status post ICD, TIA & CKD who presented  from home after being noted to have low blood pressure.    Patient was feeling dizzy and lightheaded.  Patient was recently in the hospital with pneumonia.   Patient also has been complaining of diarrhea a few times for 2 days.  No fever or cough.  No shortness of breath or orthopnea.  At home she was noted to have systolic blood pressure in 80s.    ED Workup:  EKG: A sensed V paced rhythm  BUN 52 creatinine 2.28  Sodium 132 potassium 4.7-5.7 today  H/H 8.4/27.2  --------------------------------------------------------------------------------------------------------------------------------  ROS 10 systems reviewed, pertinent findings above.  ROS    History:  Past Medical History:    Acute kidney insufficiency    Anemia    Arrhythmia    Arthritis    Deep vein thrombosis (HCC)    Essential hypertension    High blood pressure    History of blood transfusion    Seizure disorder (HCC)    Seizures (HCC)     Past Surgical History:   Procedure Laterality Date    Colonoscopy N/A 2024    Dr. Rios    Colonoscopy N/A 2024    Procedure: COLONOSCOPY;  Surgeon: Zoraida Rios MD;  Location: [facility] ENDOSCOPY    Egd N/A 2024    Dr. Rios    Other surgical history  2017    Heart Surgery     Other surgical history  2020    Bilateral shoulder replacement      History reviewed. No pertinent family history.   reports that she quit smoking about 10 years ago. Her smoking use included cigarettes. She has never used smokeless tobacco. She reports that she does not drink alcohol and does  not use drugs.    Objective:   Temp: 98.7 °F (37.1 °C)  Pulse: 60  Resp: 16  BP: 112/66    Intake/Output:     Intake/Output Summary (Last 24 hours) at 5/16/2024 1105  Last data filed at 5/16/2024 0700  Gross per 24 hour   Intake 940 ml   Output 200 ml   Net 740 ml     Physical Exam:   General: AAOx3  HEENT: Normocephalic, anicteric sclera, neck supple.  Neck: No JVD, carotids 2+, no bruits.  Cardiac: Regular rate and rhythm. S1, S2 normal. No murmur, pericardial rub, S3.  Lungs: Clear without wheezes, rales, rhonchi or dullness.  Normal excursions and effort.  Abdomen: Soft, non-tender. BS-present.  Extremities: Without clubbing, cyanosis or edema.  Peripheral pulses are 2+.  Neurologic: Non-focal  Skin: Warm and dry.     Assessment:      Nonischemic cardiomyopathy  Severe systolic dysfunction status post ICD  Status post MVR-nonmetallic  Paroxysmal A-fib  CHF-compensated  On GDMT    H/o HTN  Hypotension  2/2 dehydration/diarrhea     GEORGE on CKD   Hyperkalemia  Management per nephrology    Anemia  2/2 gi bleed-recent  Off anticoagulants      Rheumatoid arthritis  On methotrexate    History of DVT    Plan:  Amiodarone 200 mg daily  Coreg 3.125 mg daily  Hold Entresto/hydralazine and isosorbide dinitrate  Normal saline at 100 cc an hour  Pantoprazole 40 mg twice daily   DVT prophylaxis with heparin      Level of care: C5    Smooth Swain MD        5/16/2024  11:05 AM

## 2024-05-16 NOTE — HISTORICAL OFFICE NOTE
[facility]  Outside Information  Continuity of Care Document  1/28/2024  Margaret Silverio - 79 y.o. Female; born [birth year]Summary of episode note, generated on Apr. 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 Shane for heart failure with reduced ejection fraction.Was in the hospital last month with the severe GI bleeding anemia treated heart failure and EF 30% on echo January 2024. We did do a CTA at the time to visualize left atrial appendage and it appeared to be occluded probably surgically at the time mitral valve surgery. Still awaiting surgical records from [place]. Peers like does not need anticoagulation nor candidate or need for any watchman despite the atrial fibrillation given the left atrial appendage appears to be stable occluded likely.Comes in with family also reports edema taking Lasix daily was diuresed in the hospital with IV diuretics. Does have follow-up with primary care regarding anemia and is no longer on anticoagulation.Previously noted: Her ejection fraction has continued at 33% despite goal-directed medical therapy greater than 90 days. She is class II heart failure and a narrow QRS. She does have paroxysmal atrial fibrillation a long NH. EKG 2 months ago with sinus rhythm current one looks probably sinus with very long NH with ectopy. She has shortness of breath it 2 flights of stairs and 2 blocks but no PND orthopnea noted.Patient has a history of possible TIA although she says it was a seizure back in November 2022 also had a GI bleed in April 2023 but is back on her Eliquis but with history of these issues high risk for anticoagulation would consider for watchman. Talked about some of these issues she reported that she is doing  fine on the blood thinners at this point is rather vague with a history with a GI bleed that required her to be in the ICU April 2023 also vague about the possible TIA history and was not sure about that back in November 2022 with or without was just a seizure. Does have history of mitral valve replacement and EF has been fluctuating and last dropped about 35% and medications were adjusted.     PROBLEMS  Reconcile with Patient's ChartPROBLEMS  Problem Effective Dates Date resolved Problem Status   On amiodarone therapy, [SNOMED-CT: 493095932] 1/29/2024 - Active   Automatic implantable cardiac defibrillator (S/P AICD), [SNOMED-CT: 632621188] 1/29/2024 - Active   Atrial fibrillation (Afib), paroxysmal - A Fib, [SNOMED-CT: 545853760] 1/29/2024 - Active   Claudication, [SNOMED-CT: 42377044] 7/27/2022 - Active   ACC/AHA stage B systolic heart failure, [SNOMED-CT: 560685088919585] 7/27/2022 - Active   History of mitral valve replacement (MVR) - Hx, [SNOMED-CT: 9711975679704] 6/22/2022 - Active   Benign essential HTN, [SNOMED-CT: 4923603] 6/22/2022 - Active     ENCOUNTER DIAGNOSIS    ENCOUNTER DIAGNOSIS  Problem Effective Dates Date resolved Problem Status   On amiodarone therapy, [SNOMED-CT: 269179208] 1/29/2024 - Active   Automatic implantable cardiac defibrillator (S/P AICD), [SNOMED-CT: 740232177] 1/29/2024 - Active   Atrial fibrillation (Afib), paroxysmal - A Fib, [SNOMED-CT: 303823074] 1/29/2024 - Active   Claudication, [SNOMED-CT: 52816644] 7/27/2022 - Active   ACC/AHA stage B systolic heart failure, [SNOMED-CT: 235018669674590] 7/27/2022 - Active   History of mitral valve replacement (MVR) - Hx, [SNOMED-CT: 6686735567826] 6/22/2022 - Active   Benign essential HTN, [SNOMED-CT: 1619241] 6/22/2022 - Active     VITAL SIGNS    VITAL SIGNS  Date / Time: 1/29/2024   BP Systolic 120 mmHg   BP Diastolic 62 mmHg   Height 64 inches   Weight 145 lbs   Pulse Rate 60 bpm   BSA (Body Surface Area) 1.7 cc/m2   BMI (Body Mass  Index) 24.9 cc/m2   Blood Pressure 120 / 62 mmHg     PHYSICAL EXAMINATION    PHYSICAL EXAMINATION  Header Details   Constitutional 99%o2   Vitals Left Arm Sitting  / 62 mmHg, Pulse rate 60 bpm, Regular, Height in 5' 4\", BMI: 24.9, Weight in 145.51 lbs (or) 66 kgs, BSA : 1.73 cc/m²   General Appearance No Acute Distress, Appropriate   Head/Eyes/Ears/Nose/Mouth/Throat Conjunctiva pink, Sclera Clear, Mucous membranes Moist   Neck Normal carotid pulsations   Respiratory Unlabored, Equal bilaterally   Cardiovascular Regular rhythm. Normal and normal S1 and S2   Gastrointestinal Abdomen soft, Non-tender, Normoactive bowel sounds   Musculoskeletal Normal spine   Gait Normal gait   Strength and tone Normal muscle strength   Upper Extremities No clubbing, No cyanosis   Lower Extremities Pulses 2+ and equal bilaterally, No edema   Skin Warm and dry, No rashes or lesions   Neurologic / Psychiatric Alert and Oriented   Speech Normal speech     ALLERGIES, ADVERSE REACTIONS, ALERTS    ALLERGIES, ADVERSE REACTIONS, ALERTS  Type Substance Reaction Severity Status   Allergies lisinopril - Ingredient, [RxNorm: 11532] cough Mild Active     MEDICATIONS ADMINISTERED DURING VISIT    No data available    MEDICATIONS  Reconcile with Patient's ChartMEDICATIONS  Medication Start Date Route/Frequency Status   alendronate 70 mg tablet, [RxNorm: 349034] 6/22/2022 Take 1 tablet orally once a week Active   amiodarone 200 mg tablet, [RxNorm: 456857] 12/11/2023 Take 1 tablet orally 2 times a day. Active   amitriptyline 10 mg tablet, [RxNorm: 383493] 5/8/2023 Take 1 tablet orally once a day as needed. Active   carvediloL 12.5 mg tablet, [RxNorm: 478589] 1/25/2023 Take 1 tablet orally 2 times a day. Active   Entresto 49 mg-51 mg tablet, [RxNorm: 8851269] 5/8/2023 Take 1 tablet orally 2 times a day. Active   folic acid 800 mcg tablet, [RxNorm: 577697] 5/8/2023 Take 1 tablet orally once a day. Active   metHOTREXate sodium 2.5 mg tablet, [RxNorm:  135455] 6/22/2022 Take 6 tablets orally once a week Active   pantoprazole 40 mg tablet,delayed release, [RxNorm: 191290] 6/22/2022 Take 1 tablet orally once a day. Active   amiodarone 200 mg tablet, [RxNorm: 433790] 1/29/2024 Take 1 tablet orally once a day. Active     ASSESSMENT    Heart failure with reduced ejection fraction  Echo EF 38% January 2024  EF 33% nonischemic with no coronary disease at cath despite goal-directed medical therapy  Follows with Dr. Shane March 11Atrial fibrillation, paroxysmal  Amiodarone started December 2023  Does have elevated TSH but normal T4 recheck labs in 3 months and decrease amiodarone dose  He had paroxysmal atrial fibrillation that was noted during hospitalization with possible TIA in the fall 2022  15% burden of A-fib on device  No longer on anticoagulation due to severe life-threatening GI bleed and now left atrial appendage is shown to be likely occluded surgically and does not need long-term anticoagulation likely  [id number] score equal to 5Plan  Decrease amiodarone to 200 mg only once a day  Thyroid function labs in 3 months  Follow-up myself in 6 months  Follow myself also annually during device check  Annual follow-up in the device clinic  Routine follow-up with Dr. Boles as scheduled March 11     FAMILY HISTORY    FAMILY HISTORY  Relationship Age Diagnosis   Father 0 No history of Family history of heart disease   Mother 0 No history of Family history of heart disease     GENERAL STATUS    No data available    PAST MEDICAL HISTORY    PAST MEDICAL HISTORY  Problem Date diagonsed Date resolved Status   On amiodarone therapy, [SNOMED-CT: 290922711] 1/29/2024 - Active   Atrial fibrillation (Afib), paroxysmal - A Fib, [SNOMED-CT: 119500108] 1/29/2024 - Active   Claudication, [SNOMED-CT: 03168254] 7/27/2022 - Active   ACC/AHA stage B systolic heart failure, [SNOMED-CT: 036466768138820] 7/27/2022 - Active   History of mitral valve replacement (MVR) - Hx, [SNOMED-CT:  6307970848830] 6/22/2022 - Active   Benign essential HTN, [SNOMED-CT: 0429218] 6/22/2022 - Active     HISTORY OF PRESENT ILLNESS    Patient is here for dual-chamber ICD, paroxysmal atrial fibrillation, 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 Shane for heart failure with reduced ejection fraction.Was in the hospital last month with the severe GI bleeding anemia treated heart failure and EF 30% on echo January 2024. We did do a CTA at the time to visualize left atrial appendage and it appeared to be occluded probably surgically at the time mitral valve surgery. Still awaiting surgical records from [place]. Peers like does not need anticoagulation nor candidate or need for any watchman despite the atrial fibrillation given the left atrial appendage appears to be stable occluded likely.Comes in with family also reports edema taking Lasix daily was diuresed in the hospital with IV diuretics. Does have follow-up with primary care regarding anemia and is no longer on anticoagulation.Previously noted: Her ejection fraction has continued at 33% despite goal-directed medical therapy greater than 90 days. She is class II heart failure and a narrow QRS. She does have paroxysmal atrial fibrillation a long SC. EKG 2 months ago with sinus rhythm current one looks probably sinus with very long SC with ectopy. She has shortness of breath it 2 flights of stairs and 2 blocks but no PND orthopnea noted.Patient has a history of possible TIA although she says it was a seizure back in November 2022 also had a GI bleed in April 2023 but is back on her Eliquis but with history of these issues high risk for anticoagulation would consider for watchman. Talked about some of these issues she reported that she is doing fine on the blood thinners at this point is rather vague with a history with a GI bleed that required her to be in the ICU April 2023 also vague about the possible TIA  history and was not sure about that back in November 2022 with or without was just a seizure. Does have history of mitral valve replacement and EF has been fluctuating and last dropped about 35% and medications were adjusted.     IMMUNIZATIONS  Reconcile with Patient's ChartNo data available    PLAN OF CARE    PLAN OF CARE  Planned Care Date   EKG (electrocardiogram) 1/1/1900   Thyroid Function Panel 1/1/1900   Take 1 tablet orally once a day.-amiodarone 200 mg tablet 1/29/2024   Referral Visit - Johnathon Rodriguez [email or web address]) : 1/1/1900   Follow up visit - Luis Sargent MD 2/21/2024   Follow up visit - Luis Sargent MD 2/21/2024     PROCEDURES    No data available    RESULTS    RESULTS  Name Result Date Location - Ordered By   GLUCOSE [LOINC: 2339-0] 104 mg/dL [High] 09/25/2023 12:35:00 PM [facility] ([facility])  Address: [address] [facility]  [address]  tel:   SODIUM [LOINC: 2951-2] 139 mmol/L 09/25/2023 12:35:00 PM [facility] ([facility])  Address: [address] [facility]  [address]  tel:   POTASSIUM [LOINC: 2823-3] 4.0 mmol/L 09/25/2023 12:35:00 PM [facility] ([facility])  Address: [address]  [facility]  [address]  tel:   CHLORIDE [LOINC: 2075-0] 106 mmol/L 09/25/2023 12:35:00 PM [facility] ([facility])  Address: [address] [facility]  [address]  tel:   CO2 [LOINC: 2028-9] 20.0 mmol/L [Low] 09/25/2023 12:35:00 PM [facility] ([facility])  Address: [address]  [facility]  [address]  tel:   ANION GAP [LOINC: 33037-3] 13 mmol/L 09/25/2023 12:35:00 PM [facility] ([facility])  Address: [address]  [facility]  [address]  tel:   BUN [LOINC: 6299-2] 18 mg/dL 09/25/2023 12:35:00 PM [facility] ([facility])  Address: [address]  [facility]  [address]  tel:   CREATININE [LOINC: 45715-8] 1.47  mg/dL [High] 09/25/2023 12:35:00 PM [facility] LAB ([facility])  Address: [address]  [facility]  [address]  tel:   BUN/ CREAT RATIO [LOINC: 3097-3] 12.2 09/25/2023 12:35:00 PM [facility] LAB ([facility])  Address: [address]  [facility]  [address]  tel:   CALCIUM [LOINC: 52198-6] 9.6 mg/dL 09/25/2023 12:35:00 PM [facility] ([facility])  Address: [address]  [facility]  [address]  tel:   OSMOLALITY CALCULATED [LOINC: 27779-6] 290 mOsm/kg 09/25/2023 12:35:00 PM [facility] ([facility])  Address: [address]  [facility]  [address]  tel:   E GFR CR [LOINC: 96589-9] 37 mL/min/1.73m2 [Low] 09/25/2023 12:35:00 PM [facility] ([facility])  Address: [address]  [facility]  [address]  tel:   FASTING PATIENT BMP ANSWER [LOINC: 55278-9] Yes 09/25/2023 12:35:00 PM [facility] ([facility])  Address: [address]  [facility]  [address]  tel:   MRSA SCREEN BY PCR [LOINC: 30838-6] Negative 09/25/2023 12:35:00 PM [facility] ([facility])  Address: [address]  [facility]  [address]  tel:   MORPHOLOGY [LOINC: 7477725] See morphology below [Abnormal] 09/25/2023 12:35:00 PM [facility] ([facility])  Address: [address]  [facility]  [address]  tel:   PLATELET MORPHOLOGY [LOINC: 8541917] Normal 09/25/2023 12:35:00 PM [facility] ([facility])  Address: [address]  [facility]  [address]  tel:   MACROCYTOSIS [LOINC: 738-5] 1+ 09/25/2023 12:35:00 PM [facility] ([facility])  Address: [address]  [facility]  [address]  tel:   POLYCHROMASIA [LOINC: 37993-3] 1+ 09/25/2023 12:35:00 PM [facility] ([facility])  Address: [address]  [facility]  [address]  tel:   OVALOCYTES [LOINC: 774-0] 1+ 09/25/2023 12:35:00 PM [facility]  LAB ([facility])  Address: [address]  [facility]  [address]  tel:   WBC [LOINC: 6690-2] 3.8 x10(3) uL [Low] 09/25/2023 12:35:00 PM [facility] LAB ([facility])  Address: [address]  [facility]  [address]  tel:   RED BLOOD COUNT [LOINC: 789-8] 3.29 x10(6)uL [Low] 09/25/2023 12:35:00 PM [facility] ([facility])  Address: [address]  [facility]  [address]  tel:   HGB [LOINC: 718-7] 10.1 g/dL [Low] 09/25/2023 12:35:00 PM [facility] ([facility])  Address: [address]  [facility]  [address]  tel:   HCT [LOINC: 4544-3] 33.0 % [Low] 09/25/2023 12:35:00 PM [facility] ([facility])  Address: [address]  [facility]  [address]  tel:   MEAN CELL VOLUME [LOINC: 787-2] 100.3 fL [High] 09/25/2023 12:35:00 PM [facility] ([facility])  Address: [address]  [facility]  [address]  tel:   MEAN CORPUSCULAR HEMOGLOBIN [LOINC: 785-6] 30.7 pg 09/25/2023 12:35:00 PM [facility] ([facility])  Address: [address]  [facility]  [address]  tel:   MEAN CORPUSCULAR HGB CONC [LOINC: 786-4] 30.6 g/dL [Low] 09/25/2023 12:35:00 PM [facility] ([facility])  Address: [address]  [facility]  [address]  tel:   RED CELL DISTRIBUTION WIDTH-SD [LOINC: 27813-5] 67.4 fL [High] 09/25/2023 12:35:00 PM [facility] ([facility])  Address: [address]  [facility]  [address]  tel:   RED CELL DISTRIBUTION WIDTH CV [LOINC: 788-0] 19.2 % [High] 09/25/2023 12:35:00 PM [facility] ([facility])  Address: [address]  [facility]  [address]  tel:   PLATELETS [LOINC: 777-3] 190.0 10(3)uL 09/25/2023 12:35:00 PM [facility] ([facility])  Address: [address]  [facility]  [address]  tel:   NEUTROPHIL ABS PRELIM [LOINC: 751-8] 2.31 x10 (3) uL 09/25/2023 12:35:00 PM  [facility] LAB ([facility])  Address: [address]  [facility]  [address]  tel:   NEUTROPHIL ABSOLUTE [LOINC: 751-8] 2.31 x10(3) uL 09/25/2023 12:35:00 PM [facility] ([facility])  Address: [address]  [facility]  [address]  tel:   LYMPHOCYTE ABSOLUTE [LOINC: 731-0] 0.94 x10(3) uL [Low] 09/25/2023 12:35:00 PM [facility] ([facility])  Address: [address]  [facility]  [address]  tel:   MONOCYTE ABSOLUTE [LOINC: 742-7] 0.31 x10(3) uL 09/25/2023 12:35:00 PM [facility] ([facility])  Address: [address]  [facility]  [address]  tel:   EOSINOPHIL ABSOLUTE [LOINC: 711-2] 0.14 x10(3) uL 09/25/2023 12:35:00 PM [facility] ([facility])  Address: [address]  [facility]  [address]  tel:   BASOPHIL ABSOLUTE [LOINC: 704-7] 0.05 x10(3) uL 09/25/2023 12:35:00 PM [facility] ([facility])  Address: [address]  [facility]  [address]  tel:   IMMATURE GRANULOCYTE COUNT [LOINC: 97526-3] 0.02 x10(3) uL 09/25/2023 12:35:00 PM [facility] LAB ([facility])  Address: [address]  [place]  [place]  [address]  tel:   NEUTROPHIL % [LOINC: 770-8] 61.4 % 09/25/2023 12:35:00 PM [facility] ([facility])  Address: [address]  [facility]  [address]  tel:   LYMPHOCYTE % [LOINC: 736-9] 24.9 % 09/25/2023 12:35:00 PM [facility] ([facility])  Address: [address]  [facility]  [address]  tel:   MONOCYTE % [LOINC: 5905-5] 8.2 % 09/25/2023 12:35:00 PM [facility] ([facility])  Address: [address]  [facility]  [address]  tel:   EOSINOPHIL % [LOINC: 713-8] 3.7 % 09/25/2023 12:35:00 PM [facility] LAB ([facility])  Address: [address]  [facility]  [address]  tel:   BASOPHIL % [LOINC: 706-2] 1.3 % 09/25/2023 12:35:00 PM [facility] LAB ([facility]  [facility])  Address: [address]  [facility]  [address]  tel:   IMMATURE GRANULOCYTE RATIO % [LOINC: 97708-6] 0.5 % 09/25/2023 12:35:00 PM [facility] LAB ([facility])  Address: [address]  [facility]  [address]  tel:   PROTIME [LOINC: 5902-2] 18.9 seconds [High] 09/25/2023 12:35:00 PM [facility] LAB ([facility])  Address: [address]  [facility]  [address]  tel:   INR [LOINC: 68590-9] 1.61 [High] 09/25/2023 12:35:00 PM [facility] LAB ([facility])  Address: [address]  [facility]  [address]  tel:   Thyroid Function Panel [LOINC: PRJ5722] Pending 3 Months     Lower Extremity Arterial Ultrasound 1.The study quality is good. 2.Bilateral scattered plaque in the lower extremity arterial system causing less than 50% stenosis.3.Bilateral lower extremity arterial system demonstrates tri-phasic & bi-phasic waveforms.4.Left anterior tibial artery demonstrates low velocity flow.5.Three vessel runoff to the feet bilaterally. 9/22/2022 12:30:00 PM Blake Puckett MD   Trans Thoracic Echocardiogram 1.The left ventricle is normal in size. The left ventricular wall thickness is normal. Global left ventricular systolic function is moderately decreased. The left ventricular ejection fraction is 37%. Regional wall motion analysis shows dyskinesis of mid anteroseptal segment and mid inferoseptal segment. Wall motion score index is 4. Left ventricular diastolic function is indeterminate.2.Right ventricular systolic function is severely decreased. 3.The left atrial diameter is mildly increased. 4.The right atrium is mildly enlarged. 5.Severe (4+) tricuspid regurgitation. 6.A bio prosthetic valve is noted at the mitral location. The mean trans mitral gradient is 5.0 mmHg.7.The pulmonary artery systolic pressure is 40 mmHg. 9/1/2023 11:00:00 AM Luis Sargent MD     REVIEW OF SYSTEMS    REVIEW OF SYSTEMS  Header Details   Eyes No history of Blurry vision, Visual  changes, Double vision   Cardiovascular Edema  No history of Chest pain, RAM, Palpitations, Syncope, PND, Orthopnea, Claudication   Respiratory No history of SOB, Wheezing, Sputum   Hem/Lymphatic No history of Easy bruising, Blood clots, Hx of blood transfusion, Anemia, Bleeding problems     SOCIAL HISTORY    SOCIAL HISTORY  Social History Element Description Effective Dates   Smoking status Never smoked -     FUNCTIONAL STATUS    No data available    MEDICAL EQUIPMENT    No data available    Goals Sections    No data available    REASON FOR REFERRAL           Health Concerns Section    No data available    COGNITIVE/MENTAL STATUS    No data available    Patient Demographics    Patient Demographics  Patient Address Patient Name Communication   [address] [place] [address] Margaret Silverio [phone] (Mobile)     Patient Demographics  Language Race / Ethnicity Marital Status   English - Spoken (Preferred) Black or  / Not  or       Document Information    Primary Care Provider Other Service Providers Document Coverage Dates   Luis Sargent  NPI: [id number]  [phone] (Work)  [address], [address], [place] [address]  [place] [address]  Interpreting Physicians  [facility]  [phone] (Work)  [address] [place] [address] Mana Cardoso  NPI: [id number]  [phone] (Work)  [address], [address], [place] [address]  [place] [address]  Nurses     Africa Fine  NPI: [id number]  [phone] (Work)  [address], [address], [place] [address]  [place] [address]  Nurses     Ignacio Jiang  NPI: [id number]  [phone] (Work)  [address], [address], [place] [address]  [place] [address]  Nurses     Rosa Ugalde  NPI: [id number]  [phone] (Work)  [address], [address], [place] [address]  [place] [address]  Nurses Jan. 29,  2024January 29, 2024      Organization   [facility]  [phone] (Work)  [address], [address], [place] [address]  [place] [address]     Encounter Providers Encounter Date    Jan. 29, 2024January 29, 2024     Legal Authenticator    Luis Sargent  NPI: [id number]  [phone] (Work)  [address], [address], [place] [address]  [place] [address]

## 2024-05-17 LAB
ANION GAP SERPL CALC-SCNC: 4 MMOL/L (ref 0–18)
BASOPHILS # BLD AUTO: 0.02 X10(3) UL (ref 0–0.2)
BASOPHILS NFR BLD AUTO: 0.4 %
BUN BLD-MCNC: 47 MG/DL (ref 9–23)
BUN/CREAT SERPL: 28.8 (ref 10–20)
CALCIUM BLD-MCNC: 8.7 MG/DL (ref 8.7–10.4)
CHLORIDE SERPL-SCNC: 108 MMOL/L (ref 98–112)
CO2 SERPL-SCNC: 23 MMOL/L (ref 21–32)
CREAT BLD-MCNC: 1.63 MG/DL
DEPRECATED RDW RBC AUTO: 63.7 FL (ref 35.1–46.3)
EGFRCR SERPLBLD CKD-EPI 2021: 32 ML/MIN/1.73M2 (ref 60–?)
EOSINOPHIL # BLD AUTO: 0.15 X10(3) UL (ref 0–0.7)
EOSINOPHIL NFR BLD AUTO: 2.9 %
ERYTHROCYTE [DISTWIDTH] IN BLOOD BY AUTOMATED COUNT: 19 % (ref 11–15)
GLUCOSE BLD-MCNC: 98 MG/DL (ref 70–99)
HCT VFR BLD AUTO: 23 %
HGB BLD-MCNC: 7.1 G/DL
IMM GRANULOCYTES # BLD AUTO: 0.03 X10(3) UL (ref 0–1)
IMM GRANULOCYTES NFR BLD: 0.6 %
LYMPHOCYTES # BLD AUTO: 0.75 X10(3) UL (ref 1–4)
LYMPHOCYTES NFR BLD AUTO: 14.3 %
MCH RBC QN AUTO: 30.6 PG (ref 26–34)
MCHC RBC AUTO-ENTMCNC: 30.9 G/DL (ref 31–37)
MCV RBC AUTO: 99.1 FL
MONOCYTES # BLD AUTO: 0.24 X10(3) UL (ref 0.1–1)
MONOCYTES NFR BLD AUTO: 4.6 %
NEUTROPHILS # BLD AUTO: 4.04 X10 (3) UL (ref 1.5–7.7)
NEUTROPHILS # BLD AUTO: 4.04 X10(3) UL (ref 1.5–7.7)
NEUTROPHILS NFR BLD AUTO: 77.2 %
OSMOLALITY SERPL CALC.SUM OF ELEC: 292 MOSM/KG (ref 275–295)
PLATELET # BLD AUTO: 197 10(3)UL (ref 150–450)
POTASSIUM SERPL-SCNC: 5 MMOL/L (ref 3.5–5.1)
RBC # BLD AUTO: 2.32 X10(6)UL
SODIUM SERPL-SCNC: 135 MMOL/L (ref 136–145)
WBC # BLD AUTO: 5.2 X10(3) UL (ref 4–11)

## 2024-05-17 PROCEDURE — 99233 SBSQ HOSP IP/OBS HIGH 50: CPT | Performed by: HOSPITALIST

## 2024-05-17 PROCEDURE — 99232 SBSQ HOSP IP/OBS MODERATE 35: CPT | Performed by: INTERNAL MEDICINE

## 2024-05-17 RX ORDER — ACETAMINOPHEN 500 MG
500 TABLET ORAL EVERY 4 HOURS PRN
Status: DISCONTINUED | OUTPATIENT
Start: 2024-05-17 | End: 2024-05-18

## 2024-05-17 NOTE — PLAN OF CARE
IVFs continued. Norco prn for chronic pain. Had good urine output. Maintained on fall precaution.     Problem: Patient Centered Care  Goal: Patient preferences are identified and integrated in the patient's plan of care  Description: Interventions:  - What would you like us to know as we care for you?   - Provide timely, complete, and accurate information to patient/family  - Incorporate patient and family knowledge, values, beliefs, and cultural backgrounds into the planning and delivery of care  - Encourage patient/family to participate in care and decision-making at the level they choose  - Honor patient and family perspectives and choices  Outcome: Progressing     Problem: Patient/Family Goals  Goal: Patient/Family Long Term Goal  Description: Patient's Long Term Goal: To go home    Interventions:  - IVFs, Nephro to manage GEORGE, cardiac monitoring  - See additional Care Plan goals for specific interventions  Outcome: Progressing  Goal: Patient/Family Short Term Goal  Description: Patient's Short Term Goal: To have stable BP    Interventions:   - IV hydration, Nephro to manage GEORGE  - See additional Care Plan goals for specific interventions  Outcome: Progressing     Problem: CARDIOVASCULAR - ADULT  Goal: Maintains optimal cardiac output and hemodynamic stability  Description: INTERVENTIONS:  - Monitor vital signs, rhythm, and trends  - Monitor for bleeding, hypotension and signs of decreased cardiac output  - Evaluate effectiveness of vasoactive medications to optimize hemodynamic stability  - Monitor arterial and/or venous puncture sites for bleeding and/or hematoma  - Assess quality of pulses, skin color and temperature  - Assess for signs of decreased coronary artery perfusion - ex. Angina  - Evaluate fluid balance, assess for edema, trend weights  Outcome: Progressing  Goal: Absence of cardiac arrhythmias or at baseline  Description: INTERVENTIONS:  - Continuous cardiac monitoring, monitor vital signs, obtain  12 lead EKG if indicated  - Evaluate effectiveness of antiarrhythmic and heart rate control medications as ordered  - Initiate emergency measures for life threatening arrhythmias  - Monitor electrolytes and administer replacement therapy as ordered  Outcome: Progressing     Problem: METABOLIC/FLUID AND ELECTROLYTES - ADULT  Goal: Electrolytes maintained within normal limits  Description: INTERVENTIONS:  - Monitor labs and rhythm and assess patient for signs and symptoms of electrolyte imbalances  - Administer electrolyte replacement as ordered  - Monitor response to electrolyte replacements, including rhythm and repeat lab results as appropriate  - Fluid restriction as ordered  - Instruct patient on fluid and nutrition restrictions as appropriate  Outcome: Progressing  Goal: Hemodynamic stability and optimal renal function maintained  Description: INTERVENTIONS:  - Monitor labs and assess for signs and symptoms of volume excess or deficit  - Monitor intake, output and patient weight  - Monitor urine specific gravity, serum osmolarity and serum sodium as indicated or ordered  - Monitor response to interventions for patient's volume status, including labs, urine output, blood pressure (other measures as available)  - Encourage oral intake as appropriate  - Instruct patient on fluid and nutrition restrictions as appropriate  Outcome: Progressing     Problem: SAFETY ADULT - FALL  Goal: Free from fall injury  Description: INTERVENTIONS:  - Assess pt frequently for physical needs  - Identify cognitive and physical deficits and behaviors that affect risk of falls.  - [place] fall precautions as indicated by assessment.  - Educate pt/family on patient safety including physical limitations  - Instruct pt to call for assistance with activity based on assessment  - Modify environment to reduce risk of injury  - Provide assistive devices as appropriate  - Consider OT/PT consult to assist with strengthening/mobility  -  Encourage toileting schedule  Outcome: Progressing     Problem: PAIN - ADULT  Goal: Verbalizes/displays adequate comfort level or patient's stated pain goal  Description: INTERVENTIONS:  - Encourage pt to monitor pain and request assistance  - Assess pain using appropriate pain scale  - Administer analgesics based on type and severity of pain and evaluate response  - Implement non-pharmacological measures as appropriate and evaluate response  - Consider cultural and social influences on pain and pain management  - Manage/alleviate anxiety  - Utilize distraction and/or relaxation techniques  - Monitor for opioid side effects  - Notify MD/LIP if interventions unsuccessful or patient reports new pain  - Anticipate increased pain with activity and pre-medicate as appropriate  Outcome: Progressing

## 2024-05-17 NOTE — PROGRESS NOTES
[facility]  part of [facility]    Nephrology Progress Note    Chief Complaint   Patient presents with    Hypotension       Subjective:   79 year old female, following for GEORGE on CKD 3b.     Denies N/V/D.     Review of Systems:   Review of Systems   Constitutional:  Positive for fatigue. Negative for chills and fever.   Respiratory:  Negative for cough and shortness of breath.    Cardiovascular:  Negative for chest pain and leg swelling.   Gastrointestinal:  Negative for abdominal pain, constipation, diarrhea, nausea and vomiting.   Genitourinary:  Negative for difficulty urinating, dysuria and flank pain.       Objective:   Temp:  [98 °F (36.7 °C)-99.2 °F (37.3 °C)] 99.2 °F (37.3 °C)  Pulse:  [60-65] 60  Resp:  [16-20] 20  BP: (110-122)/(46-76) 114/63  SpO2:  [97 %-100 %] 100 %  SpO2: 100 %     Intake/Output Summary (Last 24 hours) at 5/17/2024 1428  Last data filed at 5/17/2024 0700  Gross per 24 hour   Intake 670 ml   Output 1000 ml   Net -330 ml     Wt Readings from Last 3 Encounters:   05/17/24 119 lb 8 oz (54.2 kg)   05/02/24 126 lb (57.2 kg)   04/23/24 126 lb (57.2 kg)     Physical Exam  Constitutional:       Appearance: Normal appearance.   Cardiovascular:      Rate and Rhythm: Normal rate and regular rhythm.      Heart sounds: Normal heart sounds.   Pulmonary:      Effort: Pulmonary effort is normal.      Breath sounds: Normal breath sounds.   Musculoskeletal:         General: Normal range of motion.   Skin:     General: Skin is warm and dry.      Comments: No edema   Neurological:      General: No focal deficit present.      Mental Status: She is alert and oriented to person, place, and time.   Psychiatric:         Mood and Affect: Mood normal.         Behavior: Behavior normal.         Medications:  Current Facility-Administered Medications   Medication Dose Route Frequency    acetaminophen (Tylenol Extra Strength) tab 500 mg  500 mg Oral Q4H PRN    amiodarone (Pacerone) tab 200 mg   200 mg Oral Daily    carvedilol (Coreg) tab 3.125 mg  3.125 mg Oral BID with meals    mirtazapine (Remeron) tab 7.5 mg  7.5 mg Oral Nightly    ferrous sulfate DR tab 325 mg  325 mg Oral Daily with breakfast    folic acid (Folvite) tab 800 mcg  800 mcg Oral Daily    pantoprazole (Protonix) DR tab 40 mg  40 mg Oral BID AC    sodium chloride 0.9% infusion   Intravenous Continuous    HYDROcodone-acetaminophen (Norco)  MG per tab 1 tablet  1 tablet Oral Q6H PRN              Results:     Recent Labs   Lab 05/15/24  1732 05/16/24  0535 05/17/24  0513   RBC 2.76* 2.45* 2.32*   HGB 8.4* 7.5* 7.1*   HCT 27.2* 24.0* 23.0*   MCV 98.6 98.0 99.1   NEPRELIM 3.11 3.74 4.04   WBC 4.7 5.2 5.2   .0 180.0 197.0     Recent Labs   Lab 05/15/24  1732 05/16/24  0535 05/17/24  0648   GLU 92 100* 98   BUN 52* 51* 47*   CREATSERUM 2.28* 2.11* 1.63*   CA 9.3 8.8 8.7   ALB 3.9  --   --    * 133* 135*   K 4.7 5.7* 5.0    103 108   CO2 25.0 25.0 23.0   ALKPHO 168*  --   --    AST 17  --   --    ALT 9*  --   --    BILT 0.5  --   --    TP 7.5  --   --      PTT   Date Value Ref Range Status   02/09/2024 35.2 23.3 - 35.6 seconds Final     INR   Date Value Ref Range Status   02/09/2024 1.25 (H) 0.80 - 1.20 Final     Comment:     Only the INR (not the PT value) should be utilized for   the monitoring of oral anticoagulant therapy.     Recommended therapeutic ranges for anticoagulant therapy are   as follows:   2.0 - 3.0 All indications except for mechanical prosthetic   cardiac valves.     2.5 - 3.5 Mechanical prosthetic cardiac valves.       No results for input(s): \"BNP\" in the last 168 hours.  No results for input(s): \"MG\", \"PHOS\" in the last 168 hours.     Recent Labs   Lab 05/15/24  1732   ALB 3.9       No results found.  EKG 12 Lead    Result Date: 5/16/2024  Atrial-sensed ventricular-paced rhythm Abnormal ECG When compared with ECG of 02-FEB-2024 20:55, Previous ECG has undetermined rhythm, needs review Confirmed by  SHELLI MORENO., CASH ([id number]) on 5/16/2024 8:34:44 AM     Assessment and Plan:     Patient is a 79 year old female with past medical history of HTN, CKD 3b, HFrEF (EF 15-20%), s/p AICD, A.fib, s/p bioprosthetic MVR, RA, and h/o GIB/AVMs, who presented with fatigue, dizziness and hypotension.      She is found to have an GEORGE. Likely due to ischemic ATN from hypotension.   Agree with holding Enteresto/Hydralazine/Isordil. Will likely need to stop some of these meds.   Cr improved to 1.63 today.   Will stop fluids.    Monitor I/Os.      Hyperkalemia:   Potassium improved to 5 today.      CHF:   Hold meds as above.   Cards following.      Anemia:   Hb decreased to 7.1 today. Iron sat 23%.   Epo 5000 units 3x/week ordered.         Gagan Mejia MD  5/17/2024

## 2024-05-17 NOTE — PROGRESS NOTES
[facility] Cardiology Progress Note    Margaret Silverio Patient Status:  Inpatient    3/14/1945 MRN [id number]   Location [facility] 3W/SW Attending Rosalinda Barry DO   Hosp Day # 2 PCP Johnathon Rodriguez DO     Subjective:  No CV concerns     Objective:  /63 (BP Location: Right arm)   Pulse 60   Temp 99.2 °F (37.3 °C) (Oral)   Resp 20   Wt 119 lb 8 oz (54.2 kg)   SpO2 100%   BMI 20.51 kg/m²     Telemetry: V paced       Intake/Output:    Intake/Output Summary (Last 24 hours) at 2024 1627  Last data filed at 2024 1536  Gross per 24 hour   Intake 670 ml   Output 1300 ml   Net -630 ml       Last 3 Weights   24 0510 119 lb 8 oz (54.2 kg)   24 0500 129 lb 12.8 oz (58.9 kg)   24 1104 126 lb (57.2 kg)   24 1324 126 lb (57.2 kg)       Labs:  Recent Labs   Lab 05/15/24  1732 24  0535 24  0648   GLU 92 100* 98   BUN 52* 51* 47*   CREATSERUM 2.28* 2.11* 1.63*   EGFRCR 21* 24* 32*   CA 9.3 8.8 8.7   * 133* 135*   K 4.7 5.7* 5.0    103 108   CO2 25.0 25.0 23.0     Recent Labs   Lab 05/15/24  1732 24  0535 24  0513   RBC 2.76* 2.45* 2.32*   HGB 8.4* 7.5* 7.1*   HCT 27.2* 24.0* 23.0*   MCV 98.6 98.0 99.1   MCH 30.4 30.6 30.6   MCHC 30.9* 31.3 30.9*   RDW 18.0* 18.0* 19.0*   NEPRELIM 3.11 3.74 4.04   WBC 4.7 5.2 5.2   .0 180.0 197.0         No results for input(s): \"TROP\", \"TROPHS\", \"CK\" in the last 168 hours.    Diagnostics:         Review of Systems   Respiratory: Negative.     Cardiovascular: Negative.      Physical Exam:    General: Alert and oriented x 3. No apparent distress.   HEENT: Normocephalic, anicteric sclera, neck supple, no thyromegaly or adenopathy.  Neck: No JVD, carotids 2+, no bruits.  Cardiac: Regular rate & rhythm. S1, S2 normal. No murmur, pericardial rub, S3, or extra cardiac sounds.  Lungs: Clear without wheezes, rales, rhonchi or dullness.  Normal excursions and effort.  Abdomen: Soft,  non-tender. No organosplenomegally, mass or rebound, BS-present.  Extremities: Without clubbing or cyanosis.  No left lower extremity edema, no right lower extremity edema.  Neurologic: Alert and oriented, normal affect. No focal defects  Skin: Warm and dry.       Medications:   amiodarone  200 mg Oral Daily    carvedilol  3.125 mg Oral BID with meals    mirtazapine  7.5 mg Oral Nightly    ferrous sulfate  325 mg Oral Daily with breakfast    folic acid  800 mcg Oral Daily    pantoprazole  40 mg Oral BID AC      sodium chloride 50 mL/hr at 05/16/24 1707       Assessment:    Weakness/ GEORGE / hypotension   - GEORGE & BP improving with gentle hydration   - Entresto, hydralazine & isordil on hold  - Nephrology following     Anemia   - No over signs of bleeding noted  - PMD following H&H    Chronic HFrEF, EF 15-20%  - [place] DC ICD in situ  - Compensated on exam   - Monitor volume status on IVF  - On Coreg. Home Entresto, hydralazine & isordil on hold    PAF   - Not on OAC s/p surgical BRANDYN   - On amio & coreg     Hyperkalemia  - Improving     Plan:    GOERGE & blood pressure improving w/ gentle hydration & holding Entresto, hydralazine & isordil   Remains compensated on exam   Continue to monitor volume status & renal fx closely     YOLIE Bucio  5/17/2024  7:30 AM  Ph [phone] ([place])  Ph [phone] ([place]helley)

## 2024-05-17 NOTE — PLAN OF CARE
Monitoring kidney function. On IVFs. Continue to hold entresto, isosorbide and hydralazine. Monitoring BP. Poor appetite with supplements ordered. Pain management for RA and headaches.        Problem: Patient Centered Care  Goal: Patient preferences are identified and integrated in the patient's plan of care  Description: Interventions:  - What would you like us to know as we care for you?   - Provide timely, complete, and accurate information to patient/family  - Incorporate patient and family knowledge, values, beliefs, and cultural backgrounds into the planning and delivery of care  - Encourage patient/family to participate in care and decision-making at the level they choose  - Honor patient and family perspectives and choices  Outcome: Progressing     Problem: Patient/Family Goals  Goal: Patient/Family Long Term Goal  Description: Patient's Long Term Goal: To go home    Interventions:  - IVFs, Nephro to manage GEORGE, cardiac monitoring  - See additional Care Plan goals for specific interventions  Outcome: Progressing  Goal: Patient/Family Short Term Goal  Description: Patient's Short Term Goal: To have stable BP    Interventions:   - IV hydration, Nephro to manage GEORGE  - See additional Care Plan goals for specific interventions  Outcome: Progressing     Problem: CARDIOVASCULAR - ADULT  Goal: Maintains optimal cardiac output and hemodynamic stability  Description: INTERVENTIONS:  - Monitor vital signs, rhythm, and trends  - Monitor for bleeding, hypotension and signs of decreased cardiac output  - Evaluate effectiveness of vasoactive medications to optimize hemodynamic stability  - Monitor arterial and/or venous puncture sites for bleeding and/or hematoma  - Assess quality of pulses, skin color and temperature  - Assess for signs of decreased coronary artery perfusion - ex. Angina  - Evaluate fluid balance, assess for edema, trend weights  Outcome: Progressing  Goal: Absence of cardiac arrhythmias or at  baseline  Description: INTERVENTIONS:  - Continuous cardiac monitoring, monitor vital signs, obtain 12 lead EKG if indicated  - Evaluate effectiveness of antiarrhythmic and heart rate control medications as ordered  - Initiate emergency measures for life threatening arrhythmias  - Monitor electrolytes and administer replacement therapy as ordered  Outcome: Progressing     Problem: METABOLIC/FLUID AND ELECTROLYTES - ADULT  Goal: Electrolytes maintained within normal limits  Description: INTERVENTIONS:  - Monitor labs and rhythm and assess patient for signs and symptoms of electrolyte imbalances  - Administer electrolyte replacement as ordered  - Monitor response to electrolyte replacements, including rhythm and repeat lab results as appropriate  - Fluid restriction as ordered  - Instruct patient on fluid and nutrition restrictions as appropriate  Outcome: Progressing  Goal: Hemodynamic stability and optimal renal function maintained  Description: INTERVENTIONS:  - Monitor labs and assess for signs and symptoms of volume excess or deficit  - Monitor intake, output and patient weight  - Monitor urine specific gravity, serum osmolarity and serum sodium as indicated or ordered  - Monitor response to interventions for patient's volume status, including labs, urine output, blood pressure (other measures as available)  - Encourage oral intake as appropriate  - Instruct patient on fluid and nutrition restrictions as appropriate  Outcome: Progressing     Problem: SAFETY ADULT - FALL  Goal: Free from fall injury  Description: INTERVENTIONS:  - Assess pt frequently for physical needs  - Identify cognitive and physical deficits and behaviors that affect risk of falls.  - [place] fall precautions as indicated by assessment.  - Educate pt/family on patient safety including physical limitations  - Instruct pt to call for assistance with activity based on assessment  - Modify environment to reduce risk of injury  - Provide  assistive devices as appropriate  - Consider OT/PT consult to assist with strengthening/mobility  - Encourage toileting schedule  Outcome: Progressing     Problem: PAIN - ADULT  Goal: Verbalizes/displays adequate comfort level or patient's stated pain goal  Description: INTERVENTIONS:  - Encourage pt to monitor pain and request assistance  - Assess pain using appropriate pain scale  - Administer analgesics based on type and severity of pain and evaluate response  - Implement non-pharmacological measures as appropriate and evaluate response  - Consider cultural and social influences on pain and pain management  - Manage/alleviate anxiety  - Utilize distraction and/or relaxation techniques  - Monitor for opioid side effects  - Notify MD/LIP if interventions unsuccessful or patient reports new pain  - Anticipate increased pain with activity and pre-medicate as appropriate  Outcome: Progressing

## 2024-05-17 NOTE — PROGRESS NOTES
[facility]  part of [facility]    Progress Note    Margaret Silverio Patient Status:  Inpatient    3/14/1945 MRN [id number]   Location [facility] 3W/SW Attending Rosalinda Barry DO   Hosp Day # 2 PCP Johnathon Rodriguez DO     Chief complaint weakness, sob     Subjective:   Margaret Silverio is a(n) 79 year old female Pt better today. Denies sob.     ROS:     No c/d   No n/v     Objective:   Blood pressure 114/63, pulse 60, temperature 99.2 °F (37.3 °C), temperature source Oral, resp. rate 20, weight 119 lb 8 oz (54.2 kg), SpO2 100%.      Intake/Output Summary (Last 24 hours) at 2024 1516  Last data filed at 2024 0700  Gross per 24 hour   Intake 670 ml   Output 1000 ml   Net -330 ml       Patient Weight(s) for the past 336 hrs:   Weight   24 0510 119 lb 8 oz (54.2 kg)   24 0500 129 lb 12.8 oz (58.9 kg)           General appearance: alert, appears stated age and cooperative  Pulmonary:  clear to auscultation bilaterally  Cardiovascular: S1, S2 normal, no murmur, click, rub or gallop, regular rate and rhythm  Abdominal: soft, non-tender; bowel sounds normal; no masses,  no organomegaly  Extremities: extremities normal, atraumatic, no cyanosis or edema        Medicines:     Current Facility-Administered Medications   Medication Dose Route Frequency    acetaminophen (Tylenol Extra Strength) tab 500 mg  500 mg Oral Q4H PRN    amiodarone (Pacerone) tab 200 mg  200 mg Oral Daily    carvedilol (Coreg) tab 3.125 mg  3.125 mg Oral BID with meals    mirtazapine (Remeron) tab 7.5 mg  7.5 mg Oral Nightly    ferrous sulfate DR tab 325 mg  325 mg Oral Daily with breakfast    folic acid (Folvite) tab 800 mcg  800 mcg Oral Daily    pantoprazole (Protonix) DR tab 40 mg  40 mg Oral BID AC    sodium chloride 0.9% infusion   Intravenous Continuous    HYDROcodone-acetaminophen (Norco)  MG per tab 1 tablet  1 tablet Oral Q6H PRN       Lab Results   Component Value Date     WBC 5.2 05/17/2024    HGB 7.1 (L) 05/17/2024    HCT 23.0 (L) 05/17/2024    .0 05/17/2024    CREATSERUM 1.63 (H) 05/17/2024    BUN 47 (H) 05/17/2024     (L) 05/17/2024    K 5.0 05/17/2024     05/17/2024    CO2 23.0 05/17/2024    GLU 98 05/17/2024    CA 8.7 05/17/2024    ALB 3.9 05/15/2024    ALKPHO 168 (H) 05/15/2024    BILT 0.5 05/15/2024    TP 7.5 05/15/2024    AST 17 05/15/2024    ALT 9 (L) 05/15/2024    PTT 35.2 02/09/2024    INR 1.25 (H) 02/09/2024    T4F 1.2 02/03/2024    TSH 10.681 (H) 02/03/2024    LIP 32 01/13/2024    ESRML 61 (H) 04/23/2024    CRP 5.10 (H) 04/23/2024    MG 1.8 02/18/2024    PHOS 4.2 04/23/2024    B12 >2,000 (H) 02/03/2024       No results found.  EKG 12 Lead    Result Date: 5/16/2024  Atrial-sensed ventricular-paced rhythm Abnormal ECG When compared with ECG of 02-FEB-2024 20:55, Previous ECG has undetermined rhythm, needs review Confirmed by RAAD MORENO, CALVO ([id number]) on 5/16/2024 8:34:44 AM     Results:     CBC:    Lab Results   Component Value Date    WBC 5.2 05/17/2024    WBC 5.2 05/16/2024    WBC 4.7 05/15/2024     Lab Results   Component Value Date    HGB 7.1 (L) 05/17/2024    HGB 7.5 (L) 05/16/2024    HGB 8.4 (L) 05/15/2024      Lab Results   Component Value Date    .0 05/17/2024    .0 05/16/2024    .0 05/15/2024       Recent Labs   Lab 05/15/24  1732 05/16/24  0535 05/17/24  0648   GLU 92 100* 98   BUN 52* 51* 47*   CREATSERUM 2.28* 2.11* 1.63*   CA 9.3 8.8 8.7   * 133* 135*   K 4.7 5.7* 5.0    103 108   CO2 25.0 25.0 23.0             Assessment and Plan:           ASSESSMENT:    1.       Hypotension, hypovolemia with acute kidney injury secondary to polypharmacy and poor appetite.  - dizziness improved with ivfs.    - K 5.7 - improved today.   - apprec renal consult   - monitor on tele   - hold anti-htns     2.       Progressive anemia.  Rule out recurrent gastrointestinal bleed.  Patient had negative rectal exam by the emergency room  physician.  - h/o chronic anemia and avm's found on last c scope in Jan   - hb 7.1 - likely dilutional. Repeat jennie m   - monitor stools for blood     3.       Nonischemic cardiomyopathy with EF 15% , h/o afib with plans for watchman. Off eliquis. Also with pacer   - cards consulted   - strict I/o and daily wts   - monitor closely   - last echo in feb with EF 15%, diastolic failure, mitral valve bioprosethesis, TR wide open.     4. Malnutrition - added remeron . Dietician to see     Rosalinda Barry, DO    Dvt ppx scds     Pt consult        Chart reviewed, including current vitals, notes, labs and imaging  Labs ordered and medications adjusted as outlined above  Coordinate care with care team/consultants  Discussed with patient results of tests, management plan as outlined above, and the need for ongoing hospitalization  D/w RN     MDM high          PHYSICIAN Certification of Need for Inpatient Hospitalization - Initial Certification    Patient will require inpatient services that will reasonably be expected to span two midnight's based on the clinical documentation in H+P.   Based on patients current state of illness, I anticipate that, after discharge, patient will require TBD.

## 2024-05-17 NOTE — PLAN OF CARE
IVFs discontinued, Hold entresto/isosorbide/hydralazine, monitoring CR, K+ and HGB. Home with [facility] when medically cleared.      Problem: Patient Centered Care  Goal: Patient preferences are identified and integrated in the patient's plan of care  Description: Interventions:  - What would you like us to know as we care for you?  - Provide timely, complete, and accurate information to patient/family  - Incorporate patient and family knowledge, values, beliefs, and cultural backgrounds into the planning and delivery of care  - Encourage patient/family to participate in care and decision-making at the level they choose  - Honor patient and family perspectives and choices  5/17/2024 1839 by Андрей Peña, RN  Outcome: Progressing  5/17/2024 1100 by Андрей Peña RN  Outcome: Progressing     Problem: Patient/Family Goals  Goal: Patient/Family Long Term Goal  Description: Patient's Long Term Goal: To go home    Interventions:  - IVFs, Nephro to manage GEORGE, cardiac monitoring  - See additional Care Plan goals for specific interventions  5/17/2024 1839 by Андрей Peña, RN  Outcome: Progressing  5/17/2024 1100 by Андрей Peña RN  Outcome: Progressing  Goal: Patient/Family Short Term Goal  Description: Patient's Short Term Goal: To have stable BP    Interventions:   - IV hydration, Nephro to manage GEORGE  - See additional Care Plan goals for specific interventions  5/17/2024 1839 by Аднрей Peña, RN  Outcome: Progressing  5/17/2024 1100 by Андрей Peña RN  Outcome: Progressing     Problem: CARDIOVASCULAR - ADULT  Goal: Maintains optimal cardiac output and hemodynamic stability  Description: INTERVENTIONS:  - Monitor vital signs, rhythm, and trends  - Monitor for bleeding, hypotension and signs of decreased cardiac output  - Evaluate effectiveness of vasoactive medications to optimize hemodynamic stability  - Monitor arterial and/or venous puncture sites for bleeding and/or hematoma  - Assess  quality of pulses, skin color and temperature  - Assess for signs of decreased coronary artery perfusion - ex. Angina  - Evaluate fluid balance, assess for edema, trend weights  5/17/2024 1839 by Андрей Peña RN  Outcome: Progressing  5/17/2024 1100 by Андрей Peña RN  Outcome: Progressing  Goal: Absence of cardiac arrhythmias or at baseline  Description: INTERVENTIONS:  - Continuous cardiac monitoring, monitor vital signs, obtain 12 lead EKG if indicated  - Evaluate effectiveness of antiarrhythmic and heart rate control medications as ordered  - Initiate emergency measures for life threatening arrhythmias  - Monitor electrolytes and administer replacement therapy as ordered  5/17/2024 1839 by Андрей Peña, RN  Outcome: Progressing  5/17/2024 1100 by Андрей Peña RN  Outcome: Progressing     Problem: METABOLIC/FLUID AND ELECTROLYTES - ADULT  Goal: Electrolytes maintained within normal limits  Description: INTERVENTIONS:  - Monitor labs and rhythm and assess patient for signs and symptoms of electrolyte imbalances  - Administer electrolyte replacement as ordered  - Monitor response to electrolyte replacements, including rhythm and repeat lab results as appropriate  - Fluid restriction as ordered  - Instruct patient on fluid and nutrition restrictions as appropriate  5/17/2024 1839 by Андрей Peña RN  Outcome: Progressing  5/17/2024 1100 by Андрей Peña RN  Outcome: Progressing  Goal: Hemodynamic stability and optimal renal function maintained  Description: INTERVENTIONS:  - Monitor labs and assess for signs and symptoms of volume excess or deficit  - Monitor intake, output and patient weight  - Monitor urine specific gravity, serum osmolarity and serum sodium as indicated or ordered  - Monitor response to interventions for patient's volume status, including labs, urine output, blood pressure (other measures as available)  - Encourage oral intake as appropriate  - Instruct patient on  fluid and nutrition restrictions as appropriate  5/17/2024 1839 by Андрей Peña, RN  Outcome: Progressing  5/17/2024 1100 by Андрей Peña, RN  Outcome: Progressing     Problem: SAFETY ADULT - FALL  Goal: Free from fall injury  Description: INTERVENTIONS:  - Assess pt frequently for physical needs  - Identify cognitive and physical deficits and behaviors that affect risk of falls.  - [place] fall precautions as indicated by assessment.  - Educate pt/family on patient safety including physical limitations  - Instruct pt to call for assistance with activity based on assessment  - Modify environment to reduce risk of injury  - Provide assistive devices as appropriate  - Consider OT/PT consult to assist with strengthening/mobility  - Encourage toileting schedule  5/17/2024 1839 by Андрей Peña, RN  Outcome: Progressing  5/17/2024 1100 by Андрей Peña, RN  Outcome: Progressing     Problem: PAIN - ADULT  Goal: Verbalizes/displays adequate comfort level or patient's stated pain goal  Description: INTERVENTIONS:  - Encourage pt to monitor pain and request assistance  - Assess pain using appropriate pain scale  - Administer analgesics based on type and severity of pain and evaluate response  - Implement non-pharmacological measures as appropriate and evaluate response  - Consider cultural and social influences on pain and pain management  - Manage/alleviate anxiety  - Utilize distraction and/or relaxation techniques  - Monitor for opioid side effects  - Notify MD/LIP if interventions unsuccessful or patient reports new pain  - Anticipate increased pain with activity and pre-medicate as appropriate  5/17/2024 1839 by Андрей Peña, RN  Outcome: Progressing  5/17/2024 1100 by Андрей Peña, RN  Outcome: Progressing

## 2024-05-18 VITALS
DIASTOLIC BLOOD PRESSURE: 68 MMHG | SYSTOLIC BLOOD PRESSURE: 126 MMHG | TEMPERATURE: 98 F | OXYGEN SATURATION: 98 % | WEIGHT: 127 LBS | BODY MASS INDEX: 22 KG/M2 | HEART RATE: 65 BPM | RESPIRATION RATE: 16 BRPM

## 2024-05-18 LAB
ANION GAP SERPL CALC-SCNC: 6 MMOL/L (ref 0–18)
BASOPHILS # BLD AUTO: 0.02 X10(3) UL (ref 0–0.2)
BASOPHILS NFR BLD AUTO: 0.5 %
BUN BLD-MCNC: 41 MG/DL (ref 9–23)
BUN/CREAT SERPL: 27.7 (ref 10–20)
CALCIUM BLD-MCNC: 9.3 MG/DL (ref 8.7–10.4)
CHLORIDE SERPL-SCNC: 109 MMOL/L (ref 98–112)
CO2 SERPL-SCNC: 23 MMOL/L (ref 21–32)
CREAT BLD-MCNC: 1.48 MG/DL
DEPRECATED RDW RBC AUTO: 62 FL (ref 35.1–46.3)
EGFRCR SERPLBLD CKD-EPI 2021: 36 ML/MIN/1.73M2 (ref 60–?)
EOSINOPHIL # BLD AUTO: 0.24 X10(3) UL (ref 0–0.7)
EOSINOPHIL NFR BLD AUTO: 5.6 %
ERYTHROCYTE [DISTWIDTH] IN BLOOD BY AUTOMATED COUNT: 17.8 % (ref 11–15)
GLUCOSE BLD-MCNC: 105 MG/DL (ref 70–99)
HCT VFR BLD AUTO: 22.6 %
HGB BLD-MCNC: 7 G/DL
IMM GRANULOCYTES # BLD AUTO: 0.02 X10(3) UL (ref 0–1)
IMM GRANULOCYTES NFR BLD: 0.5 %
LYMPHOCYTES # BLD AUTO: 0.77 X10(3) UL (ref 1–4)
LYMPHOCYTES NFR BLD AUTO: 18.1 %
MAGNESIUM SERPL-MCNC: 1.9 MG/DL (ref 1.6–2.6)
MCH RBC QN AUTO: 30.7 PG (ref 26–34)
MCHC RBC AUTO-ENTMCNC: 31 G/DL (ref 31–37)
MCV RBC AUTO: 99.1 FL
MONOCYTES # BLD AUTO: 0.24 X10(3) UL (ref 0.1–1)
MONOCYTES NFR BLD AUTO: 5.6 %
NEUTROPHILS # BLD AUTO: 2.96 X10 (3) UL (ref 1.5–7.7)
NEUTROPHILS # BLD AUTO: 2.96 X10(3) UL (ref 1.5–7.7)
NEUTROPHILS NFR BLD AUTO: 69.7 %
OSMOLALITY SERPL CALC.SUM OF ELEC: 296 MOSM/KG (ref 275–295)
PLATELET # BLD AUTO: 215 10(3)UL (ref 150–450)
POTASSIUM SERPL-SCNC: 4.9 MMOL/L (ref 3.5–5.1)
RBC # BLD AUTO: 2.28 X10(6)UL
SODIUM SERPL-SCNC: 138 MMOL/L (ref 136–145)
WBC # BLD AUTO: 4.3 X10(3) UL (ref 4–11)

## 2024-05-18 PROCEDURE — 99239 HOSP IP/OBS DSCHRG MGMT >30: CPT | Performed by: HOSPITALIST

## 2024-05-18 PROCEDURE — 99232 SBSQ HOSP IP/OBS MODERATE 35: CPT | Performed by: INTERNAL MEDICINE

## 2024-05-18 RX ORDER — MIRTAZAPINE 7.5 MG/1
7.5 TABLET, FILM COATED ORAL NIGHTLY
Qty: 30 TABLET | Refills: 0 | Status: ON HOLD | OUTPATIENT
Start: 2024-05-18

## 2024-05-18 NOTE — PLAN OF CARE
Margaret Silverio is A&O x 4.  Lives at home with  and with home health services.    One assist with walker.  Intermittent of bowel and bladder.  VSS:  stable.  Denies any current pain.  Plan:  monitor lab values.  Cardiology and nephrology on consult.  Will continue to monitor and treat.    Problem: Patient Centered Care  Goal: Patient preferences are identified and integrated in the patient's plan of care  Description: Interventions:  - What would you like us to know as we care for you? I have chronic neck pain  - Provide timely, complete, and accurate information to patient/family  - Incorporate patient and family knowledge, values, beliefs, and cultural backgrounds into the planning and delivery of care  - Encourage patient/family to participate in care and decision-making at the level they choose  - Honor patient and family perspectives and choices  Outcome: Progressing     Problem: Patient/Family Goals  Goal: Patient/Family Long Term Goal  Description: Patient's Long Term Goal: To go home    Interventions:  - IVFs, Nephro to manage GEORGE, cardiac monitoring  - See additional Care Plan goals for specific interventions  Outcome: Progressing  Goal: Patient/Family Short Term Goal  Description: Patient's Short Term Goal: To have stable BP    Interventions:   - IV hydration, Nephro to manage GEORGE  - See additional Care Plan goals for specific interventions  Outcome: Progressing     Problem: CARDIOVASCULAR - ADULT  Goal: Maintains optimal cardiac output and hemodynamic stability  Description: INTERVENTIONS:  - Monitor vital signs, rhythm, and trends  - Monitor for bleeding, hypotension and signs of decreased cardiac output  - Evaluate effectiveness of vasoactive medications to optimize hemodynamic stability  - Monitor arterial and/or venous puncture sites for bleeding and/or hematoma  - Assess quality of pulses, skin color and temperature  - Assess for signs of decreased coronary artery perfusion - ex. Angina  -  Evaluate fluid balance, assess for edema, trend weights  Outcome: Progressing  Goal: Absence of cardiac arrhythmias or at baseline  Description: INTERVENTIONS:  - Continuous cardiac monitoring, monitor vital signs, obtain 12 lead EKG if indicated  - Evaluate effectiveness of antiarrhythmic and heart rate control medications as ordered  - Initiate emergency measures for life threatening arrhythmias  - Monitor electrolytes and administer replacement therapy as ordered  Outcome: Progressing     Problem: METABOLIC/FLUID AND ELECTROLYTES - ADULT  Goal: Electrolytes maintained within normal limits  Description: INTERVENTIONS:  - Monitor labs and rhythm and assess patient for signs and symptoms of electrolyte imbalances  - Administer electrolyte replacement as ordered  - Monitor response to electrolyte replacements, including rhythm and repeat lab results as appropriate  - Fluid restriction as ordered  - Instruct patient on fluid and nutrition restrictions as appropriate  Outcome: Progressing  Goal: Hemodynamic stability and optimal renal function maintained  Description: INTERVENTIONS:  - Monitor labs and assess for signs and symptoms of volume excess or deficit  - Monitor intake, output and patient weight  - Monitor urine specific gravity, serum osmolarity and serum sodium as indicated or ordered  - Monitor response to interventions for patient's volume status, including labs, urine output, blood pressure (other measures as available)  - Encourage oral intake as appropriate  - Instruct patient on fluid and nutrition restrictions as appropriate  Outcome: Progressing     Problem: SAFETY ADULT - FALL  Goal: Free from fall injury  Description: INTERVENTIONS:  - Assess pt frequently for physical needs  - Identify cognitive and physical deficits and behaviors that affect risk of falls.  - [place] fall precautions as indicated by assessment.  - Educate pt/family on patient safety including physical limitations  - Instruct pt  to call for assistance with activity based on assessment  - Modify environment to reduce risk of injury  - Provide assistive devices as appropriate  - Consider OT/PT consult to assist with strengthening/mobility  - Encourage toileting schedule  Outcome: Progressing     Problem: PAIN - ADULT  Goal: Verbalizes/displays adequate comfort level or patient's stated pain goal  Description: INTERVENTIONS:  - Encourage pt to monitor pain and request assistance  - Assess pain using appropriate pain scale  - Administer analgesics based on type and severity of pain and evaluate response  - Implement non-pharmacological measures as appropriate and evaluate response  - Consider cultural and social influences on pain and pain management  - Manage/alleviate anxiety  - Utilize distraction and/or relaxation techniques  - Monitor for opioid side effects  - Notify MD/LIP if interventions unsuccessful or patient reports new pain  - Anticipate increased pain with activity and pre-medicate as appropriate  Outcome: Progressing

## 2024-05-18 NOTE — DISCHARGE PLANNING
Mragaret Silverio discharged to home with belongings.  Discharge instructions reviewed with patient; expressed understanding, no further questions.  Tele box and IV access removed without complications.  Patient transported via family,  stable upon discharge.

## 2024-05-18 NOTE — PHYSICAL THERAPY NOTE
PHYSICAL THERAPY EVALUATION - INPATIENT     Room Number: 321/321-A  Evaluation Date: 2024  Type of Evaluation: Initial   Physician Order: PT Eval and Treat    Presenting Problem: GEORGE, anemia, hypotension     Reason for Therapy: Mobility Dysfunction and Discharge Planning    PHYSICAL THERAPY ASSESSMENT   Patient is a 79 year old female admitted 5/15/2024 for GEORGE, anemia, hypotension.  Prior to admission, patient's baseline is independent.  Patient is currently functioning near baseline with bed mobility, transfers, and gait.  Patient is requiring supervision as a result of the following impairments: decreased functional strength and impaired motor planning.  Physical Therapy will continue to follow for duration of hospitalization.    Patient will benefit from continued skilled PT Services For duration of hospitalization, however, given the patient is functioning near baseline level do not anticipate skilled therapy needs at discharge .    PLAN  Patient has been evaluated and presents with no skilled Physical Therapy needs at this time.  Patient will be discharged from Physical Therapy services. Please re-order if a new functional limitation presents during this admission.    PHYSICAL THERAPY MEDICAL/SOCIAL HISTORY   History related to current admission: Dx with low BP during HH PT     Problem List  Principal Problem:    Acute kidney injury (HCC)  Active Problems:    Iron deficiency anemia, unspecified iron deficiency anemia type    Weakness generalized    Acute kidney insufficiency    Hyperkalemia      HOME SITUATION  Home Situation  Type of Home: House  Home Layout: One level  Lives With: Family  Patient Owned Equipment: Rolling walker     Prior Level of [place]: lives with family in home.  Indep with ADLs, has RW    SUBJECTIVE  I can go home    PHYSICAL THERAPY EXAMINATION   OBJECTIVE     Fall Risk: Standard fall risk    WEIGHT BEARING RESTRICTION                   PAIN ASSESSMENT  Ratin         COGNITION  Overall Cognitive Status:  WFL - within functional limits    RANGE OF MOTION AND STRENGTH ASSESSMENT  Upper extremity ROM and strength are within functional limits   Lower extremity ROM is within functional limits   Lower extremity strength is within functional limits except for the following:  BLE    BALANCE  Static Sitting: Good  Dynamic Sitting: Good  Static Standing: Fair  Dynamic Standing: Fair    ADDITIONAL TESTS                                    NEUROLOGICAL FINDINGS                      ACTIVITY TOLERANCE                         O2 WALK       AM-PAC '6-Clicks' INPATIENT SHORT FORM - BASIC MOBILITY  How much difficulty does the patient currently have...  Patient Difficulty: Turning over in bed (including adjusting bedclothes, sheets and blankets)?: A Little   Patient Difficulty: Sitting down on and standing up from a chair with arms (e.g., wheelchair, bedside commode, etc.): None   Patient Difficulty: Moving from lying on back to sitting on the side of the bed?: None   How much help from another person does the patient currently need...   Help from Another: Moving to and from a bed to a chair (including a wheelchair)?: None   Help from Another: Need to walk in hospital room?: A Little   Help from Another: Climbing 3-5 steps with a railing?: A Little     AM-PAC Score:  Raw Score: 21   Approx Degree of Impairment: 28.97%   Standardized Score (AM-PAC Scale): 50.25   CMS Modifier (G-Code): CJ    FUNCTIONAL ABILITY STATUS  Functional Mobility/Gait Assessment  Gait Assistance: Supervision  Distance (ft): 100  Assistive Device: None  Pattern: Shuffle  Rolling: supervision  Supine to Sit: supervision  Sit to Supine: supervision  Sit to Stand: supervision    Exercise/Education Provided:  Bed mobility  Energy conservation  Gait training  ROM    The patient's Approx Degree of Impairment: 28.97% has been calculated based on documentation in the [facility] '6 clicks' Inpatient Basic Mobility Short Form.  Research  supports that patients with this level of impairment may benefit from DC to home.  Final disposition will be made by interdisciplinary medical team.    Patient End of Session: In bed;Needs met;Call light within reach;RN aware of session/findings;All patient questions and concerns addressed    Patient was able to achieve the following ...   Patient able to transfer  Safely and independently    Patient able to ambulate on level surfaces  Safely and independently     Patient Evaluation Complexity Level:  History Moderate - 1 or 2 personal factors and/or co-morbidities   Examination of body systems Moderate - addressing a total of 3 or more elements   Clinical Presentation  Moderate - Evolving   Clinical Decision Making  Moderate Complexity     Gait Trainin minutes  Therapeutic Activity:  25 minutes  Neuromuscular Re-education: 0 minutes  Therapeutic Exercise: 0 minutes  [place] Repositionin minutes  Manual Therapy: 0 minutes  Can add/delete any of these

## 2024-05-18 NOTE — PROGRESS NOTES
[facility]  part of [facility]    Nephrology Progress Note    Chief Complaint   Patient presents with    Hypotension       Subjective:   79 year old female, following for GEORGE on CKD 3b.     Denies N/V/D.     Review of Systems:   Review of Systems   Constitutional:  Positive for fatigue. Negative for chills and fever.   Respiratory:  Negative for cough and shortness of breath.    Cardiovascular:  Negative for chest pain and leg swelling.   Gastrointestinal:  Negative for abdominal pain, constipation, diarrhea, nausea and vomiting.   Genitourinary:  Negative for difficulty urinating, dysuria and flank pain.       Objective:   Temp:  [97.6 °F (36.4 °C)-99.2 °F (37.3 °C)] 97.6 °F (36.4 °C)  Pulse:  [60-65] 65  Resp:  [14-20] 16  BP: (103-126)/(54-68) 126/68  SpO2:  [98 %-100 %] 98 %  SpO2: 98 %     Intake/Output Summary (Last 24 hours) at 5/18/2024 1339  Last data filed at 5/18/2024 1020  Gross per 24 hour   Intake 380 ml   Output 1000 ml   Net -620 ml     Wt Readings from Last 3 Encounters:   05/18/24 127 lb (57.6 kg)   05/02/24 126 lb (57.2 kg)   04/23/24 126 lb (57.2 kg)     Physical Exam  Constitutional:       Appearance: Normal appearance.   Cardiovascular:      Rate and Rhythm: Normal rate and regular rhythm.      Heart sounds: Normal heart sounds.   Pulmonary:      Effort: Pulmonary effort is normal.      Breath sounds: Normal breath sounds.   Musculoskeletal:         General: Normal range of motion.   Skin:     General: Skin is warm and dry.      Comments: No edema   Neurological:      General: No focal deficit present.      Mental Status: She is alert and oriented to person, place, and time.   Psychiatric:         Mood and Affect: Mood normal.         Behavior: Behavior normal.         Medications:  Current Facility-Administered Medications   Medication Dose Route Frequency    lidocaine-menthol 4-1 % patch 1 patch  1 patch Transdermal Daily    acetaminophen (Tylenol Extra Strength) tab 500  mg  500 mg Oral Q4H PRN    epoetin ivanna (Epogen, Procrit) 5,000 Units injection  5,000 Units Subcutaneous Once per day on Monday Wednesday Friday    amiodarone (Pacerone) tab 200 mg  200 mg Oral Daily    carvedilol (Coreg) tab 3.125 mg  3.125 mg Oral BID with meals    mirtazapine (Remeron) tab 7.5 mg  7.5 mg Oral Nightly    ferrous sulfate DR tab 325 mg  325 mg Oral Daily with breakfast    folic acid (Folvite) tab 800 mcg  800 mcg Oral Daily    pantoprazole (Protonix) DR tab 40 mg  40 mg Oral BID AC    HYDROcodone-acetaminophen (Norco)  MG per tab 1 tablet  1 tablet Oral Q6H PRN              Results:     Recent Labs   Lab 05/16/24  0535 05/17/24  0513 05/18/24  0528   RBC 2.45* 2.32* 2.28*   HGB 7.5* 7.1* 7.0*   HCT 24.0* 23.0* 22.6*   MCV 98.0 99.1 99.1   NEPRELIM 3.74 4.04 2.96   WBC 5.2 5.2 4.3   .0 197.0 215.0     Recent Labs   Lab 05/15/24  1732 05/16/24  0535 05/17/24  0648 05/18/24  0528   GLU 92 100* 98 105*   BUN 52* 51* 47* 41*   CREATSERUM 2.28* 2.11* 1.63* 1.48*   CA 9.3 8.8 8.7 9.3   ALB 3.9  --   --   --    * 133* 135* 138   K 4.7 5.7* 5.0 4.9    103 108 109   CO2 25.0 25.0 23.0 23.0   ALKPHO 168*  --   --   --    AST 17  --   --   --    ALT 9*  --   --   --    BILT 0.5  --   --   --    TP 7.5  --   --   --      PTT   Date Value Ref Range Status   02/09/2024 35.2 23.3 - 35.6 seconds Final     INR   Date Value Ref Range Status   02/09/2024 1.25 (H) 0.80 - 1.20 Final     Comment:     Only the INR (not the PT value) should be utilized for   the monitoring of oral anticoagulant therapy.     Recommended therapeutic ranges for anticoagulant therapy are   as follows:   2.0 - 3.0 All indications except for mechanical prosthetic   cardiac valves.     2.5 - 3.5 Mechanical prosthetic cardiac valves.       No results for input(s): \"BNP\" in the last 168 hours.  Recent Labs   Lab 05/18/24  0528   MG 1.9        Recent Labs   Lab 05/15/24  1732   ALB 3.9       No results found.         Assessment and Plan:     Patient is a 79 year old female with past medical history of HTN, CKD 3b, HFrEF (EF 15-20%), s/p AICD, A.fib, s/p bioprosthetic MVR, RA, and h/o GIB/AVMs, who presented with fatigue, dizziness and hypotension.      She is found to have an GEORGE on CKD 3b. Likely due to ischemic ATN from hypotension.   Cr improved to 1.48 today.   Per cards, will stop Hydralazine/Isordil and restart Entresto at MO.      Hyperkalemia:   Potassium improved to 4.9 today.      CHF:   Cont meds per cards.   Cards following.      Anemia:   Hb 7 today. Iron sat 23%.   Epo 5000 units x1.   To consider hematology referral outpatient.       Okay to MO today.      Gagan Mejia MD  5/18/2024

## 2024-05-18 NOTE — PLAN OF CARE
Stable vital signs. Norco for headache with relief.  Plan: home with [facility] -pending medical clearance.    Problem: Patient Centered Care  Goal: Patient preferences are identified and integrated in the patient's plan of care  Description: Interventions:  - What would you like us to know as we care for you?   - Provide timely, complete, and accurate information to patient/family  - Incorporate patient and family knowledge, values, beliefs, and cultural backgrounds into the planning and delivery of care  - Encourage patient/family to participate in care and decision-making at the level they choose  - Honor patient and family perspectives and choices  Outcome: Progressing     Problem: Patient/Family Goals  Goal: Patient/Family Short Term Goal  Description: Patient's Short Term Goal: To have stable BP    Interventions:   - IV hydration, Nephro to manage GEORGE  - See additional Care Plan goals for specific interventions  Outcome: Progressing     Problem: CARDIOVASCULAR - ADULT  Goal: Maintains optimal cardiac output and hemodynamic stability  Description: INTERVENTIONS:  - Monitor vital signs, rhythm, and trends  - Monitor for bleeding, hypotension and signs of decreased cardiac output  - Evaluate effectiveness of vasoactive medications to optimize hemodynamic stability  - Monitor arterial and/or venous puncture sites for bleeding and/or hematoma  - Assess quality of pulses, skin color and temperature  - Assess for signs of decreased coronary artery perfusion - ex. Angina  - Evaluate fluid balance, assess for edema, trend weights  Outcome: Progressing  Goal: Absence of cardiac arrhythmias or at baseline  Description: INTERVENTIONS:  - Continuous cardiac monitoring, monitor vital signs, obtain 12 lead EKG if indicated  - Evaluate effectiveness of antiarrhythmic and heart rate control medications as ordered  - Initiate emergency measures for life threatening arrhythmias  - Monitor electrolytes and administer replacement therapy  as ordered  Outcome: Progressing     Problem: METABOLIC/FLUID AND ELECTROLYTES - ADULT  Goal: Electrolytes maintained within normal limits  Description: INTERVENTIONS:  - Monitor labs and rhythm and assess patient for signs and symptoms of electrolyte imbalances  - Administer electrolyte replacement as ordered  - Monitor response to electrolyte replacements, including rhythm and repeat lab results as appropriate  - Fluid restriction as ordered  - Instruct patient on fluid and nutrition restrictions as appropriate  Outcome: Progressing  Goal: Hemodynamic stability and optimal renal function maintained  Description: INTERVENTIONS:  - Monitor labs and assess for signs and symptoms of volume excess or deficit  - Monitor intake, output and patient weight  - Monitor urine specific gravity, serum osmolarity and serum sodium as indicated or ordered  - Monitor response to interventions for patient's volume status, including labs, urine output, blood pressure (other measures as available)  - Encourage oral intake as appropriate  - Instruct patient on fluid and nutrition restrictions as appropriate  Outcome: Progressing     Problem: SAFETY ADULT - FALL  Goal: Free from fall injury  Description: INTERVENTIONS:  - Assess pt frequently for physical needs  - Identify cognitive and physical deficits and behaviors that affect risk of falls.  - [place] fall precautions as indicated by assessment.  - Educate pt/family on patient safety including physical limitations  - Instruct pt to call for assistance with activity based on assessment  - Modify environment to reduce risk of injury  - Provide assistive devices as appropriate  - Consider OT/PT consult to assist with strengthening/mobility  - Encourage toileting schedule  Outcome: Progressing     Problem: PAIN - ADULT  Goal: Verbalizes/displays adequate comfort level or patient's stated pain goal  Description: INTERVENTIONS:  - Encourage pt to monitor pain and request assistance  -  Assess pain using appropriate pain scale  - Administer analgesics based on type and severity of pain and evaluate response  - Implement non-pharmacological measures as appropriate and evaluate response  - Consider cultural and social influences on pain and pain management  - Manage/alleviate anxiety  - Utilize distraction and/or relaxation techniques  - Monitor for opioid side effects  - Notify MD/LIP if interventions unsuccessful or patient reports new pain  - Anticipate increased pain with activity and pre-medicate as appropriate  Outcome: Progressing     Problem: Patient Centered Care  Goal: Patient preferences are identified and integrated in the patient's plan of care  Description: Interventions:  - What would you like us to know as we care for you? Home with .  - Provide timely, complete, and accurate information to patient/family  - Incorporate patient and family knowledge, values, beliefs, and cultural backgrounds into the planning and delivery of care  - Encourage patient/family to participate in care and decision-making at the level they choose  - Honor patient and family perspectives and choices  Outcome: Progressing     Problem: Patient/Family Goals  Goal: Patient/Family Short Term Goal  Description: Patient's Short Term Goal: To have stable BP    Interventions:   - IV hydration, Nephro to manage GEORGE  - See additional Care Plan goals for specific interventions  Outcome: Progressing     Problem: CARDIOVASCULAR - ADULT  Goal: Maintains optimal cardiac output and hemodynamic stability  Description: INTERVENTIONS:  - Monitor vital signs, rhythm, and trends  - Monitor for bleeding, hypotension and signs of decreased cardiac output  - Evaluate effectiveness of vasoactive medications to optimize hemodynamic stability  - Monitor arterial and/or venous puncture sites for bleeding and/or hematoma  - Assess quality of pulses, skin color and temperature  - Assess for signs of decreased coronary artery  perfusion - ex. Angina  - Evaluate fluid balance, assess for edema, trend weights  Outcome: Progressing  Goal: Absence of cardiac arrhythmias or at baseline  Description: INTERVENTIONS:  - Continuous cardiac monitoring, monitor vital signs, obtain 12 lead EKG if indicated  - Evaluate effectiveness of antiarrhythmic and heart rate control medications as ordered  - Initiate emergency measures for life threatening arrhythmias  - Monitor electrolytes and administer replacement therapy as ordered  Outcome: Progressing     Problem: METABOLIC/FLUID AND ELECTROLYTES - ADULT  Goal: Electrolytes maintained within normal limits  Description: INTERVENTIONS:  - Monitor labs and rhythm and assess patient for signs and symptoms of electrolyte imbalances  - Administer electrolyte replacement as ordered  - Monitor response to electrolyte replacements, including rhythm and repeat lab results as appropriate  - Fluid restriction as ordered  - Instruct patient on fluid and nutrition restrictions as appropriate  Outcome: Progressing  Goal: Hemodynamic stability and optimal renal function maintained  Description: INTERVENTIONS:  - Monitor labs and assess for signs and symptoms of volume excess or deficit  - Monitor intake, output and patient weight  - Monitor urine specific gravity, serum osmolarity and serum sodium as indicated or ordered  - Monitor response to interventions for patient's volume status, including labs, urine output, blood pressure (other measures as available)  - Encourage oral intake as appropriate  - Instruct patient on fluid and nutrition restrictions as appropriate  Outcome: Progressing     Problem: SAFETY ADULT - FALL  Goal: Free from fall injury  Description: INTERVENTIONS:  - Assess pt frequently for physical needs  - Identify cognitive and physical deficits and behaviors that affect risk of falls.  - [place] fall precautions as indicated by assessment.  - Educate pt/family on patient safety including physical  limitations  - Instruct pt to call for assistance with activity based on assessment  - Modify environment to reduce risk of injury  - Provide assistive devices as appropriate  - Consider OT/PT consult to assist with strengthening/mobility  - Encourage toileting schedule  Outcome: Progressing     Problem: PAIN - ADULT  Goal: Verbalizes/displays adequate comfort level or patient's stated pain goal  Description: INTERVENTIONS:  - Encourage pt to monitor pain and request assistance  - Assess pain using appropriate pain scale  - Administer analgesics based on type and severity of pain and evaluate response  - Implement non-pharmacological measures as appropriate and evaluate response  - Consider cultural and social influences on pain and pain management  - Manage/alleviate anxiety  - Utilize distraction and/or relaxation techniques  - Monitor for opioid side effects  - Notify MD/LIP if interventions unsuccessful or patient reports new pain  - Anticipate increased pain with activity and pre-medicate as appropriate  Outcome: Progressing

## 2024-05-18 NOTE — CM/SW NOTE
05/18/24 1400   Discharge disposition   Expected discharge disposition Home-Health   Post Acute Care Provider   ([facility])   Discharge transportation Private car     Pt discussed during nursing rounds. Pt is stable for dc today. MD dc order entered. [facility] will resume RN and therapy services at OR, agency notified of dc home today. Pt's family will provide transport at OR.    Plan: Home w/family with [facility] today.    / to remain available for support and/or discharge planning.     EVA SpringerN    [phone]

## 2024-05-18 NOTE — DISCHARGE SUMMARY
[facility]  part of [facility]    Discharge Summary    Margaret Silverio Patient Status:  Inpatient    3/14/1945 MRN [id number]   Location [facility] 3W/SW Attending Rosalinda Barry DO   Hosp Day # 3 PCP Johnathon Rodriguez DO     Date of Admission: 5/15/2024 Disposition: SNF Long Term Care (NH)     Date of Discharge: 2024      Admitting Diagnosis: Weakness generalized [R53.1]  Acute kidney injury (HCC) [N17.9]  Chronic anemia     Hospital Discharge Diagnoses:     As above     Hospital Discharge Diagnoses:  as above     Lace+ Score: 79  59-90 High Risk  29-58 Medium Risk  0-28   Low Risk.    TCM Follow-Up Recommendation:  LACE > 58: High Risk of readmission after discharge from the hospital.          Lace+ Score: 79  59-90 High Risk  29-58 Medium Risk  0-28   Low Risk    Risk of readmission: Margaret Silverio has High Risk of readmission after discharge from the hospital.    Problem List:   Patient Active Problem List   Diagnosis    Altered mental status    Altered mental status, unspecified altered mental status type    Seizure (HCC)    Lactic acidosis    Leukocytosis    Acute GI bleeding    GEORGE (acute kidney injury) (HCC)    Thrombocytopenia (HCC)    Metabolic acidosis    Atrial fibrillation, chronic (HCC)    Sepsis due to urinary tract infection (HCC)    Sepsis due to Escherichia coli (HCC)    ABLA (acute blood loss anemia)    Melena    Atrial fibrillation with rapid ventricular response (HCC)    Acute chest pain    Acute on chronic congestive heart failure, unspecified heart failure type (HCC)    Pleural effusion    ACC/AHA stage B systolic heart failure (HCC)    Congestive heart failure (HCC)    Coronary arteriosclerosis    Essential (primary) hypertension    Mitral valve stenosis    Rheumatoid arthritis (HCC)    Stage 3 chronic kidney disease (HCC)    Atrial flutter (HCC)    Acute on chronic HFrEF (heart failure with reduced ejection fraction) (HCC)    Dyspnea,  unspecified type    Hypothermia, initial encounter    Anticoagulated    Coagulopathy (HCC)    SIRS (systemic inflammatory response syndrome) (HCC)    Anemia due to stage 3 chronic kidney disease (HCC)    Anemia due to GI blood loss    Anemia of chronic disease    Lymphopenia    Pancytopenia ([facility])    Goals of care, counseling/discussion    Advance care planning    Palliative care by specialist    Acute deep vein thrombosis (DVT) of left upper extremity (HCC)    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HCC)    Cardiogenic shock (HCC)    HFrEF (heart failure with reduced ejection fraction) (HCC)    Anemia    Acute kidney injury (HCC)    Iron deficiency anemia, unspecified iron deficiency anemia type    Weakness generalized    Acute kidney insufficiency    Hyperkalemia       Reason for Admission: sob and weakness     Physical Exam:   General appearance: alert, appears stated age and cooperative  Pulmonary:  clear to auscultation bilaterally  Cardiovascular: S1, S2 normal, no murmur, click, rub or gallop, regular rate and rhythm  Abdominal: soft, non-tender; bowel sounds normal; no masses,  no organomegaly  Extremities: extremities normal, atraumatic, no cyanosis or edema  Psychiatric: calm        History of Present Illness:     HISTORY OF PRESENT ILLNESS:  The patient is a 79-year-old  female with underlying nonischemic cardiomyopathy who came in today to the emergency department for evaluation of progressive weakness, fatigued, and lightheadedness, especially when she stands up.  She was noted to have systolic blood pressure of 99 upon arrival.  Chemistry showed GFR of 21 which is half of her baseline.  BUN and creatinine of 52 and 2.28.  CBC showed hemoglobin of 8.4 which has dropped from 10.4 in February 2024.  Rectal exam done by the emergency room physician was negative.  Urinalysis showed no evidence of urinary tract infection.  Patient was started on IV fluids, and she will be admitted to the hospital for further  management.       Hospital Course:     ASSESSMENT:    1.       Hypotension, hypovolemia with acute kidney injury secondary to polypharmacy and poor appetite.  - dizziness improved with ivfs.    - K 5.7 - improved today.   - apprec renal consult - ok for dc. Pt to f/u with Hem for epo at dc - referral sent for Dr Warren   - monitor on tele   - hold anti-htns      2.       Progressive anemia.  Rule out recurrent gastrointestinal bleed.  Patient had negative rectal exam by the emergency room physician.  - h/o chronic anemia and avm's found on last c scope in Jan   - hb 7.1 - likely dilutional. Repeat in am - stable   - monitor stools for blood - neg   - will f/u with Hem for epo      3.       Nonischemic cardiomyopathy with EF 15% , h/o afib with plans for watchman. Off eliquis. Also with pacer   - cards consulted   - strict I/o and daily wts   - monitor closely   - last echo in feb with EF 15%, diastolic failure, mitral valve bioprosethesis, TR wide open.      4. Malnutrition - added remeron . Dietician to see ; f/u with Dr Rodriguez in one week.      Rosalinda Barry, DO     Dvt ppx scds      Pt consult         Chart reviewed, including current vitals, notes, labs and imaging  Labs ordered and medications adjusted as outlined above  Coordinate care with care team/consultants  Discussed with patient results of tests, management plan as outlined above, and the need for ongoing hospitalization  D/w RN     MDM high              PHYSICIAN Certification of Need for Inpatient Hospitalization - Initial Certification         Consultations:  Dr Mejia renal, Dr Wiliam willis     Procedures: none    Complications: none    Discharge Condition: Good    Discharge Medications:      Discharge Medications        START taking these medications        Instructions Prescription details   mirtazapine 7.5 MG Tabs  Commonly known as: Remeron      Take 1 tablet (7.5 mg total) by mouth nightly.   Quantity: 30 tablet  Refills: 0             CONTINUE taking these medications        Instructions Prescription details   alendronate 70 MG Tabs  Commonly known as: Fosamax      Take 1 tablet (70 mg total) by mouth once a week.   Quantity: 12 tablet  Refills: 0     amiodarone 200 MG Tabs  Commonly known as: Pacerone      Take 1 tablet (200 mg total) by mouth daily.   Refills: 0     carvedilol 3.125 MG Tabs  Commonly known as: Coreg      Take 1 tablet (3.125 mg total) by mouth 2 (two) times daily with meals.   Quantity: 60 tablet  Refills: 1     ferrous sulfate 325 (65 FE) MG Tbec      Take 1 tablet (325 mg total) by mouth daily with breakfast.   Refills: 0     folic acid 800 MCG Tabs  Commonly known as: FOLVITE      Take 1 tablet (800 mcg total) by mouth daily.   Quantity: 90 tablet  Refills: 0     HYDROcodone-acetaminophen  MG Tabs  Commonly known as: Norco      Take 1 tablet by mouth every 6 (six) hours as needed for Pain.   Quantity: 60 tablet  Refills: 0     methotrexate 2.5 MG Tabs  Commonly known as: Rheumatrex      TAKE 6 TABLETS BY MOUTH 1 TIME A WEEK   Quantity: 77 tablet  Refills: 0     pantoprazole 40 MG Tbec  Commonly known as: Protonix      TAKE 1 TABLET(40 MG) BY MOUTH TWICE DAILY BEFORE MEALS   Quantity: 180 tablet  Refills: 0     sacubitril-valsartan 49-51 MG Tabs  Commonly known as: Entresto      Take 1 tablet by mouth 2 (two) times daily.   Quantity: 60 tablet  Refills: 1            STOP taking these medications      amitriptyline 25 MG Tabs  Commonly known as: Elavil        ciprofloxacin 250 MG Tabs  Commonly known as: Cipro        hydrALAZINE 25 MG Tabs  Commonly known as: Apresoline        isosorbide dinitrate 10 MG Tabs  Commonly known as: Isordil                  Where to Get Your Medications        These medications were sent to [id number] [place], [place] - [address], [phone], [phone] [address], [facility] [address]      Phone: [phone]   mirtazapine 7.5 MG  Tabs         Follow up Visits: Follow-up with pcp Dr Rodriguez  in 1 week    Follow up Labs: none     Other Discharge Instructions: f/u with Hem Dr Warren for new consult for Epo and her rheum Dr Rodney at [place] [facility]     Rosalinda Barry, DO  5/18/2024  1:57 PM    > 35 min

## 2024-05-18 NOTE — PROGRESS NOTES
[facility] Cardiology Progress Note    Margaret Silverio Patient Status:  Inpatient    3/14/1945 MRN [id number]   Location [facility] 3W/SW Attending Rosalinda Barry DO   Hosp Day # 3 PCP Johnathon Rodriguez DO     Subjective:  No CV concerns     Objective:  /68 (BP Location: Right arm)   Pulse 65   Temp 97.6 °F (36.4 °C) (Oral)   Resp 16   Wt 127 lb (57.6 kg)   SpO2 98%   BMI 21.80 kg/m²     Telemetry: V paced       Intake/Output:    Intake/Output Summary (Last 24 hours) at 2024 1319  Last data filed at 2024 1020  Gross per 24 hour   Intake 380 ml   Output 1000 ml   Net -620 ml       Last 3 Weights   24 0633 127 lb (57.6 kg)   24 0510 119 lb 8 oz (54.2 kg)   24 0500 129 lb 12.8 oz (58.9 kg)   24 1104 126 lb (57.2 kg)   24 1324 126 lb (57.2 kg)       Labs:  Recent Labs   Lab 24  0535 24  0648 24  0528   * 98 105*   BUN 51* 47* 41*   CREATSERUM 2.11* 1.63* 1.48*   EGFRCR 24* 32* 36*   CA 8.8 8.7 9.3   * 135* 138   K 5.7* 5.0 4.9    108 109   CO2 25.0 23.0 23.0     Recent Labs   Lab 24  0535 24  0513 24  0528   RBC 2.45* 2.32* 2.28*   HGB 7.5* 7.1* 7.0*   HCT 24.0* 23.0* 22.6*   MCV 98.0 99.1 99.1   MCH 30.6 30.6 30.7   MCHC 31.3 30.9* 31.0   RDW 18.0* 19.0* 17.8*   NEPRELIM 3.74 4.04 2.96   WBC 5.2 5.2 4.3   .0 197.0 215.0         No results for input(s): \"TROP\", \"TROPHS\", \"CK\" in the last 168 hours.    Diagnostics:         Review of Systems   Respiratory: Negative.     Cardiovascular: Negative.      Physical Exam:    General: Alert and oriented x 3. No apparent distress.   HEENT: Normocephalic, anicteric sclera, neck supple, no thyromegaly or adenopathy.  Neck: No JVD, carotids 2+, no bruits.  Cardiac: Regular rate & rhythm. S1, S2 normal. No  pericardial rub, S3, or extra cardiac sounds. +murmur   Lungs: Clear without wheezes, rales, rhonchi or dullness.  Normal excursions and  effort.  Abdomen: Soft, non-tender. No organosplenomegally, mass or rebound, BS-present.  Extremities: Without clubbing or cyanosis.  No left lower extremity edema, no right lower extremity edema.  Neurologic: Alert and oriented, normal affect. No focal defects  Skin: Warm and dry.       Medications:   lidocaine-menthol  1 patch Transdermal Daily    epoetin ivanna  5,000 Units Subcutaneous Once per day on Monday Wednesday Friday    amiodarone  200 mg Oral Daily    carvedilol  3.125 mg Oral BID with meals    mirtazapine  7.5 mg Oral Nightly    ferrous sulfate  325 mg Oral Daily with breakfast    folic acid  800 mcg Oral Daily    pantoprazole  40 mg Oral BID AC       Assessment:    Weakness/ GEORGE / hypotension   - GEORGE & BP improved with gentle hydration   - Entresto, hydralazine & isordil on hold  - Nephrology following     Anemia   - No over signs of bleeding noted  - PMD following H&H    Chronic HFrEF, EF 15-20%  - [place] DC ICD in situ  - Compensated on exam   - Monitor volume status on IVF  - On Coreg. Home Entresto, hydralazine & isordil on hold    PAF   - Not on OAC s/p surgical BRANDYN   - On amio & coreg     Hyperkalemia  - Resolved      GEORGE/CKD   - Improving      Plan:    GEORGE & blood pressure improved w/ hydration   D/w Nephrology, will resume home entresto. Hold off on hydralazine & isordil at this time.   Remains compensated on exam   Ok to discharge from Cardiology standpoint. Pt to f/u with Dr. Boles next week as scheduled     YOLIE Bucio  5/18/2024  1:22 PM  Ph [phone] ([place])  Ph [phone] ([place])        CARDIOLOGIST ADDENDUM:    I agree with the findings above.  I have personally performed the Assessment and Plan in its entirety, as detailed below:    Chronic HFrEF, EF 15-20%  -Compensated on exam   -Continue Coreg  -Restart Entresto  -Close outpt f/u    PAF   -Continue amio  -No anticoag since had surgical BRANDYN ligation    GEORGE/CKD   -Monitor as outpt as HF meds restarted    Colt Montero  M.D.   Cardiology/Electrophysiology   5/18/2024  L3  -----------------------------------------

## 2024-05-19 NOTE — CDS QUERY
Dear Dr Barry:  Please specify patient nutritional status:   (x ) Moderate Protein-Calorie Malnutrition   ( )  Other (please specify):            Clinical indicators:    Nutritional Assessment 5/16 : Pt meets moderate malnutrition criteria.    BMI: 21.80  HT: 5' 4'' WT: 129 lb 12.8 oz   weight loss and declined PO.    acute illness/injury related to wt loss 7.5% in 3 months, energy intake less than 75% for greater than 7 days,  moderate depletion muscle mass clavicle region  mild depletion muscle mass temple region    Risk factors: 79yr F with generalized weakness, chronic anemia,     Treatment: RD added Magic Cup (290 calories/ 9 g protein each) BID Vanilla and mixed berry   Vitamin and mineral supplements - folic acid and iron        Use of terms such as suspected, possible, or probable (associated with a specific diagnosis that is being evaluated, monitored, or treated as if it exists) are acceptable and can be coded in the inpatient setting, when documented at the time of discharge      If you have any questions, please contact Clinical :  TONY Holden at [phone]     Thank You!    THIS FORM IS A PERMANENT PART OF THE MEDICAL RECORD

## 2024-05-20 ENCOUNTER — TELEPHONE (OUTPATIENT)
Dept: FAMILY MEDICINE CLINIC | Facility: CLINIC | Age: 79
End: 2024-05-20

## 2024-05-20 NOTE — PAYOR COMM NOTE
--------------  CONTINUED STAY REVIEW  5/17-5/18  Payor: [employer]  Subscriber #:  [id number]  Authorization Number: [id number]    Admit date: 5/15/24  Admit time:  9:13 PM    REVIEW DOCUMENTATION:  5/17 Nephrology  Constitutional:  Positive for fatigue. Negative for chills and fever.       Patient is a 79 year old female with past medical history of HTN, CKD 3b, HFrEF (EF 15-20%), s/p AICD, A.fib, s/p bioprosthetic MVR, RA, and h/o GIB/AVMs, who presented with fatigue, dizziness and hypotension.      She is found to have an GEORGE. Likely due to ischemic ATN from hypotension.   Agree with holding Enteresto/Hydralazine/Isordil. Will likely need to stop some of these meds.   Cr improved to 1.63 today.   Will stop fluids.    Monitor I/Os.      Hyperkalemia:   Potassium improved to 5 today.      CHF:   Hold meds as above.   Cards following.      Anemia:   Hb decreased to 7.1 today. Iron sat 23%.   Epo 5000 units 3x/week ordered.         5/17 IM    Chief complaint weakness, sob         sodium chloride 0.9% infusion   Intravenous Continuous    HYDROcodone-acetaminophen (Norco)  MG per tab 1 tablet  1 tablet Oral Q6H PRN     omponent Value Date     WBC 5.2 05/17/2024     HGB 7.1 (L) 05/17/2024     HCT 23.0 (L) 05/17/2024     .0 05/17/2024     CREATSERUM 1.63 (H) 05/17/2024     BUN 47 (H) 05/17/2024      (L) 05/17/2024     K 5.0 05/17/2024      05/17/2024     CO2 23.0 05/17/2024     GLU 98 05/17/2024     CA 8.7 05/17/2024       ASSESSMENT:    1.       Hypotension, hypovolemia with acute kidney injury secondary to polypharmacy and poor appetite.  - dizziness improved with ivfs.    - K 5.7 - improved today.   - apprec renal consult   - monitor on tele   - hold anti-htns      2.       Progressive anemia.  Rule out recurrent gastrointestinal bleed.  Patient had negative rectal exam by the emergency room physician.  - h/o chronic anemia and avm's found on last c scope in Jan   - hb 7.1 -  likely dilutional. Repeat jennie m   - monitor stools for blood      3.       Nonischemic cardiomyopathy with EF 15% , h/o afib with plans for watchman. Off eliquis. Also with pacer   - cards consulted   - strict I/o and daily wts   - monitor closely   - last echo in feb with EF 15%, diastolic failure, mitral valve bioprosethesis, TR wide open.      4. Malnutrition - added remeron . Dietician to see      Rosalinda Barry, DO     Dvt ppx scds           5/18 Cardiology  Assessment:     Weakness/ GEORGE / hypotension   - GEORGE & BP improved with gentle hydration   - Entresto, hydralazine & isordil on hold  - Nephrology following      Anemia   - No over signs of bleeding noted  - PMD following H&H     Chronic HFrEF, EF 15-20%  - [place] DC ICD in situ  - Compensated on exam   - Monitor volume status on IVF  - On Coreg. Home Entresto, hydralazine & isordil on hold     PAF   - Not on OAC s/p surgical BRANDYN   - On amio & coreg      Hyperkalemia  - Resolved       GEORGE/CKD   - Improving       Plan:     GEORGE & blood pressure improved w/ hydration   D/w Nephrology, will resume home entresto. Hold off on hydralazine & isordil at this time.   Remains compensated on exam   Ok to discharge from Cardiology standpoint. Pt to f/u with Dr. Boles next week as scheduled      YOLIE Bucio  5/18/2024  1:22 PM  Ph [phone] ([place])  Ph [phone] ([place])          CARDIOLOGIST ADDENDUM:     I agree with the findings above.  I have personally performed the Assessment and Plan in its entirety, as detailed below:     Chronic HFrEF, EF 15-20%  -Compensated on exam   -Continue Coreg  -Restart Entresto  -Close outpt f/u     PAF   -Continue amio  -No anticoag since had surgical BRANDYN ligation     GEORGE/CKD   -Monitor as outpt as HF meds restarted        5/18 Nephrology    Patient is a 79 year old female with past medical history of HTN, CKD 3b, HFrEF (EF 15-20%), s/p AICD, A.fib, s/p bioprosthetic MVR, RA, and h/o GIB/AVMs, who presented with  fatigue, dizziness and hypotension.      She is found to have an GEORGE on CKD 3b. Likely due to ischemic ATN from hypotension.   Cr improved to 1.48 today.   Per cards, will stop Hydralazine/Isordil and restart Entresto at dc.      Hyperkalemia:   Potassium improved to 4.9 today.      CHF:   Cont meds per cards.   Cards following.      Anemia:   Hb 7 today. Iron sat 23%.   Epo 5000 units x1.   To consider hematology referral outpatient.                Medications 05/12/24 05/13/24 05/14/24 05/15/24 05/16/24 05/17/24 05/18/24   epoetin ivanna (Epogen, Procrit) 5,000 Units injection  Dose: 5,000 Units  Freq: Once per day on Monday Wednesday Friday Route: SC  Start: 05/17/24 1645 End: 05/18/24 1724   Order specific questions:            1642 FL-Given      1724-D/C'd      lidocaine-menthol 4-1 % patch 1 patch  Dose: 1 patch  Freq: Daily Route: TD  Start: 05/18/24 1030 End: 05/18/24 1724   Order specific questions:             1037 PV-Patch Applied     1524 (Patch removed) [C]-AD     1724-D/C'd      sodium chloride 0.9% infusion 1,000 mL  Dose: 1,000 mL  Freq: Once Route: IV  Last Dose: 1,000 mL (05/15/24 1945)  Start: 05/15/24 1921 End: 05/15/24 1945       1945 SR-New Bag [C]     2023 SR-Handoff                        Medications 05/12/24 05/13/24 05/14/24 05/15/24 05/16/24 05/17/24 05/18/24   sodium chloride 0.9% infusion  Rate: 50 mL/hr  Freq: Continuous Route: IV  Last Dose: Stopped (05/17/24 1638)  Start: 05/15/24 2130 End: 05/17/24 1629 2136 AM-New Bag      0523 AM-New Bag     1404 FL-New Bag          1707 FL-Rate/Dose Change      1629-D/C'd  1638 FL-Stopped             Medications 05/12/24 05/13/24 05/14/24 05/15/24 05/16/24 05/17/24 05/18/24   acetaminophen (Tylenol Extra Strength) tab 500 mg  Dose: 500 mg  Freq: Every 4 hours PRN Route: OR  PRN Reasons: Fever,Headaches  PRN Comment: equal to or greater than 100.4  Start: 05/17/24 0916 End: 05/18/24 1724   Admin Instructions:   do not initiate oral therapy  until 6-8 hours after the last IV acetaminophen dose if IV acetaminophen was used previously         0917 FL-Given      1724-D/C'd      HYDROcodone-acetaminophen (Norco)  MG per tab 1 tablet  Dose: 1 tablet  Freq: Every 6 hours PRN Route: OR  PRN Reasons: moderate pain,severe pain  Start: 05/15/24 2243 End: 05/18/24 1724       2314 AM-Given      0903 LF-Given     2144 AM-Given      0627 AM-Given     1326 FL-Given     2135 GO-Given      1724-D/C'd          Vitals (last day) before discharge       Date/Time Temp Pulse Resp BP SpO2 Weight O2 Device O2 Flow Rate (L/min) [facility]    05/18/24 1021 97.6 °F (36.4 °C) 65 16 126/68 98 % -- None (Room air) -- PV    05/18/24 0633 -- -- -- -- -- 127 lb -- -- JR    05/18/24 0523 98.1 °F (36.7 °C) 60 14 120/64 100 % -- None (Room air) -- GO    05/17/24 2044 98.9 °F (37.2 °C) 61 15 103/65 99 % -- None (Room air) --     05/17/24 1740 -- -- -- 113/54 -- -- -- -- FL    05/17/24 1400 99.2 °F (37.3 °C) 60 20 114/63 100 % -- None (Room air) -- FL    05/17/24 0900 98.2 °F (36.8 °C) 60 18 121/61 97 % -- None (Room air) -- FL    05/17/24 0510 98.6 °F (37 °C) 62 16 122/46 97 % -- None (Room air) -- AM    05/17/24 0510 -- -- -- -- -- 119 lb 8 oz -- -- TP           --------------  DISCHARGE REVIEW    Payor: [facility] MEDICARE  Subscriber #:  [id number]  Authorization Number: [id number]    Admit date: 5/15/24  Admit time:   9:13 PM  Discharge Date: 5/18/2024  3:24 PM     Admitting Physician: Azul Sahni MD  Attending Physician:  No att. providers found  Primary Care Physician: Johnathon Rodriguez DO          Discharge Summary Notes        Discharge Summary signed by Rosalinda Barry DO at 5/18/2024  2:01 PM       Author: Rosalinda Barry DO Specialty: HOSPITALIST Author Type: Physician    Filed: 5/18/2024  2:01 PM Date of Service: 5/18/2024  1:57 PM Status: Signed    : Rosalinda Barry DO (Physician)           [facility]  [facility] [place]  [facility]    Discharge Summary    Margaret Silverio Patient Status:  Inpatient    3/14/1945 MRN [id number]   Location [facility] 3W/SW Attending Rosalinda Barry DO   Hosp Day # 3 PCP Johnathon Rodriguez DO     Date of Admission: 5/15/2024 Disposition: SNF Long Term Care ([facility])     Date of Discharge: 2024      Admitting Diagnosis: Weakness generalized [R53.1]  Acute kidney injury (HCC) [N17.9]  Chronic anemia     Hospital Discharge Diagnoses:     As above     Hospital Discharge Diagnoses:  as above     Lace+ Score: 79  59-90 High Risk  29-58 Medium Risk  0-28   Low Risk.    TCM Follow-Up Recommendation:  LACE > 58: High Risk of readmission after discharge from the hospital.          Lace+ Score: 79  59-90 High Risk  29-58 Medium Risk  0-28   Low Risk    Risk of readmission: Margaret Silverio has High Risk of readmission after discharge from the hospital.    Problem List:   Patient Active Problem List   Diagnosis    Altered mental status    Altered mental status, unspecified altered mental status type    Seizure (HCC)    Lactic acidosis    Leukocytosis    Acute GI bleeding    GEORGE (acute kidney injury) (HCC)    Thrombocytopenia (HCC)    Metabolic acidosis    Atrial fibrillation, chronic (HCC)    Sepsis due to urinary tract infection (HCC)    Sepsis due to Escherichia coli (HCC)    ABLA (acute blood loss anemia)    Melena    Atrial fibrillation with rapid ventricular response (HCC)    Acute chest pain    Acute on chronic congestive heart failure, unspecified heart failure type (HCC)    Pleural effusion    ACC/AHA stage B systolic heart failure (HCC)    Congestive heart failure (HCC)    Coronary arteriosclerosis    Essential (primary) hypertension    Mitral valve stenosis    Rheumatoid arthritis (HCC)    Stage 3 chronic kidney disease (HCC)    Atrial flutter (HCC)    Acute on chronic HFrEF (heart failure with reduced ejection fraction) (HCC)    Dyspnea, unspecified type    Hypothermia, initial encounter     Anticoagulated    Coagulopathy (HCC)    SIRS (systemic inflammatory response syndrome) (HCC)    Anemia due to stage 3 chronic kidney disease (HCC)    Anemia due to GI blood loss    Anemia of chronic disease    Lymphopenia    Pancytopenia (HCC)    Goals of care, counseling/discussion    Advance care planning    Palliative care by specialist    Acute deep vein thrombosis (DVT) of left upper extremity (HCC)    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HCC)    Cardiogenic shock (HCC)    HFrEF (heart failure with reduced ejection fraction) (HCC)    Anemia    Acute kidney injury (HCC)    Iron deficiency anemia, unspecified iron deficiency anemia type    Weakness generalized    Acute kidney insufficiency    Hyperkalemia       Reason for Admission: sob and weakness     Physical Exam:   General appearance: alert, appears stated age and cooperative  Pulmonary:  clear to auscultation bilaterally  Cardiovascular: S1, S2 normal, no murmur, click, rub or gallop, regular rate and rhythm  Abdominal: soft, non-tender; bowel sounds normal; no masses,  no organomegaly  Extremities: extremities normal, atraumatic, no cyanosis or edema  Psychiatric: calm        History of Present Illness:     HISTORY OF PRESENT ILLNESS:  The patient is a 79-year-old  female with underlying nonischemic cardiomyopathy who came in today to the emergency department for evaluation of progressive weakness, fatigued, and lightheadedness, especially when she stands up.  She was noted to have systolic blood pressure of 99 upon arrival.  Chemistry showed GFR of 21 which is half of her baseline.  BUN and creatinine of 52 and 2.28.  CBC showed hemoglobin of 8.4 which has dropped from 10.4 in February 2024.  Rectal exam done by the emergency room physician was negative.  Urinalysis showed no evidence of urinary tract infection.  Patient was started on IV fluids, and she will be admitted to the hospital for further management.       Hospital Course:     ASSESSMENT:     1.       Hypotension, hypovolemia with acute kidney injury secondary to polypharmacy and poor appetite.  - dizziness improved with ivfs.    - K 5.7 - improved today.   - apprec renal consult - ok for dc. Pt to f/u with Hem for epo at dc - referral sent for Dr Warren   - monitor on tele   - hold anti-htns      2.       Progressive anemia.  Rule out recurrent gastrointestinal bleed.  Patient had negative rectal exam by the emergency room physician.  - h/o chronic anemia and avm's found on last c scope in Jan   - hb 7.1 - likely dilutional. Repeat in am - stable   - monitor stools for blood - neg   - will f/u with Hem for epo      3.       Nonischemic cardiomyopathy with EF 15% , h/o afib with plans for watchman. Off eliquis. Also with pacer   - cards consulted   - strict I/o and daily wts   - monitor closely   - last echo in feb with EF 15%, diastolic failure, mitral valve bioprosethesis, TR wide open.      4. Malnutrition - added remeron . Dietician to see ; f/u with Dr Rodriguez in one week.      Rosalinda Barry, DO     Dvt ppx scds      Pt consult         Chart reviewed, including current vitals, notes, labs and imaging  Labs ordered and medications adjusted as outlined above  Coordinate care with care team/consultants  Discussed with patient results of tests, management plan as outlined above, and the need for ongoing hospitalization  D/w RN     MDM high              PHYSICIAN Certification of Need for Inpatient Hospitalization - Initial Certification         Consultations:  Dr Mejia renal, Dr Wiliam willis     Procedures: none    Complications: none    Discharge Condition: Good    Discharge Medications:      Discharge Medications        START taking these medications        Instructions Prescription details   mirtazapine 7.5 MG Tabs  Commonly known as: Remeron      Take 1 tablet (7.5 mg total) by mouth nightly.   Quantity: 30 tablet  Refills: 0            CONTINUE taking these medications        Instructions  Prescription details   alendronate 70 MG Tabs  Commonly known as: Fosamax      Take 1 tablet (70 mg total) by mouth once a week.   Quantity: 12 tablet  Refills: 0     amiodarone 200 MG Tabs  Commonly known as: Pacerone      Take 1 tablet (200 mg total) by mouth daily.   Refills: 0     carvedilol 3.125 MG Tabs  Commonly known as: Coreg      Take 1 tablet (3.125 mg total) by mouth 2 (two) times daily with meals.   Quantity: 60 tablet  Refills: 1     ferrous sulfate 325 (65 FE) MG Tbec      Take 1 tablet (325 mg total) by mouth daily with breakfast.   Refills: 0     folic acid 800 MCG Tabs  Commonly known as: FOLVITE      Take 1 tablet (800 mcg total) by mouth daily.   Quantity: 90 tablet  Refills: 0     HYDROcodone-acetaminophen  MG Tabs  Commonly known as: Norco      Take 1 tablet by mouth every 6 (six) hours as needed for Pain.   Quantity: 60 tablet  Refills: 0     methotrexate 2.5 MG Tabs  Commonly known as: Rheumatrex      TAKE 6 TABLETS BY MOUTH 1 TIME A WEEK   Quantity: 77 tablet  Refills: 0     pantoprazole 40 MG Tbec  Commonly known as: Protonix      TAKE 1 TABLET(40 MG) BY MOUTH TWICE DAILY BEFORE MEALS   Quantity: 180 tablet  Refills: 0     sacubitril-valsartan 49-51 MG Tabs  Commonly known as: Entresto      Take 1 tablet by mouth 2 (two) times daily.   Quantity: 60 tablet  Refills: 1            STOP taking these medications      amitriptyline 25 MG Tabs  Commonly known as: Elavil        ciprofloxacin 250 MG Tabs  Commonly known as: Cipro        hydrALAZINE 25 MG Tabs  Commonly known as: Apresoline        isosorbide dinitrate 10 MG Tabs  Commonly known as: Isordil                  Where to Get Your Medications        These medications were sent to [id number] [place], [place] - [address] AT [facility] [address], [phone], [phone] [address], [facility] [address]      Phone: [phone]   mirtazapine 7.5 MG Tabs         Follow up Visits: Follow-up with pcp Dr Rodriguez   in 1 week    Follow up Labs: none     Other Discharge Instructions: f/u with Hem Dr Warren for new consult for Epo and her rheum Dr Rodney at [facility]     Rosalinda Barry DO  5/18/2024  1:57 PM    > 35 min       Electronically signed by Rosalinda Barry DO on 5/18/2024  2:01 PM         REVIEWER COMMENTS

## 2024-05-20 NOTE — TELEPHONE ENCOUNTER
Flaca from [facility] calling to state delay of care order for patient for tomorrow , 05/21/24.

## 2024-05-21 ENCOUNTER — PATIENT OUTREACH (OUTPATIENT)
Dept: CASE MANAGEMENT | Age: 79
End: 2024-05-21

## 2024-05-21 ENCOUNTER — TELEPHONE (OUTPATIENT)
Dept: FAMILY MEDICINE CLINIC | Facility: CLINIC | Age: 79
End: 2024-05-21

## 2024-05-21 NOTE — TELEPHONE ENCOUNTER
Multiple failed attempts were made to reach the patient for a Transitional Care Management hospital follow up call. The patient does have a hospital follow up appointment scheduled with Dr. Rodriguez on 6/25/2024, but a Transitional Care Management/hospital follow up appointment is recommended by 5/25/2024, as the patient is a high risk for readmission.  Please advise.    BOOK BY DATE (last date for Transitional Care Management): 6/1/2024    Please follow up with the patient and assist with scheduling a Transitional Care Management/hospital follow up appointment.  Thank you!

## 2024-05-21 NOTE — TELEPHONE ENCOUNTER
Rena from [facility] said delay in care. Patient does not answer or return their calls. Daughter has told [facility] that patient has appointment with cardiology today 5/21/24 and 5/22/24. They will try and see patient on 5/23/24 but if they do not answer they will have to term home care. Please advise

## 2024-05-21 NOTE — PROGRESS NOTES
Attempted to reach the patient to complete a Transitional Care Management-Hospital follow up call. Left a message for the patient to call the nurse care manager back at, [phone].

## 2024-05-21 NOTE — PROGRESS NOTES
Initial Post Discharge Follow Up   Discharge Date: 5/18/24  Contact Date: 5/21/2024    Consent Verification:  Assessment Completed With: Patient  HIPAA Verified?  Yes    Discharge Dx:     Hypotension, hypovolemia with acute kidney injury secondary to polypharmacy and poor appetite.    Progressive anemia.    Nonischemic cardiomyopathy with EF 15%    h/o afib with plans for watchman. Off eliquis.   Malnutrition    General:   Nurse care manager spoke to the patient briefly. The patient was currently at an appointment and was unable to speak to the nurse care manager. The patient requested to call the nurse care manager back later.     A telephone encounter was sent to the primary care physician office for an appointment request.

## 2024-05-21 NOTE — TELEPHONE ENCOUNTER
Left detailed message for Flaca/ Rena of [facility]  per Dr Rodriguez's notes below     Advised to call back with any questions/ concerns

## 2024-05-21 NOTE — TELEPHONE ENCOUNTER
Noted.  I will try to encourage the patient to respond to efforts once we see her as a follow-up.  Thank you.

## 2024-05-22 ENCOUNTER — TELEPHONE (OUTPATIENT)
Dept: FAMILY MEDICINE CLINIC | Facility: CLINIC | Age: 79
End: 2024-05-22

## 2024-05-22 NOTE — TELEPHONE ENCOUNTER
Rena with Home Health called to advise Dr. Rodriguez patient is not returning call to Home Health. Rena advised they will discharge the patient. Home Health spoke to daughter who advised patient \"is not interested\".

## 2024-05-24 ENCOUNTER — HOSPITAL ENCOUNTER (INPATIENT)
Facility: HOSPITAL | Age: 79
LOS: 10 days | Discharge: HOME HEALTH | End: 2024-06-03
Attending: EMERGENCY MEDICINE | Admitting: HOSPITALIST
Payer: MEDICARE

## 2024-05-24 ENCOUNTER — APPOINTMENT (OUTPATIENT)
Dept: GENERAL RADIOLOGY | Facility: HOSPITAL | Age: 79
End: 2024-05-24
Attending: EMERGENCY MEDICINE
Payer: MEDICARE

## 2024-05-24 DIAGNOSIS — J96.01 ACUTE RESPIRATORY FAILURE WITH HYPOXIA (HCC): Primary | ICD-10-CM

## 2024-05-24 DIAGNOSIS — N28.9 RENAL INSUFFICIENCY: ICD-10-CM

## 2024-05-24 DIAGNOSIS — E87.5 HYPERKALEMIA: ICD-10-CM

## 2024-05-24 LAB
ALBUMIN SERPL-MCNC: 4.2 G/DL (ref 3.2–4.8)
ALBUMIN/GLOB SERPL: 1.2 {RATIO} (ref 1–2)
ALP LIVER SERPL-CCNC: 244 U/L
ALT SERPL-CCNC: 29 U/L
ANION GAP SERPL CALC-SCNC: 9 MMOL/L (ref 0–18)
AST SERPL-CCNC: 41 U/L (ref ?–34)
BASE EXCESS BLD CALC-SCNC: -7.5 MMOL/L (ref ?–2)
BASOPHILS # BLD AUTO: 0.04 X10(3) UL (ref 0–0.2)
BASOPHILS NFR BLD AUTO: 0.8 %
BILIRUB SERPL-MCNC: 1 MG/DL (ref 0.2–1.1)
BLOOD GAS EPAP: 5 CM H2O
BLOOD GAS IPAP: 10 CM H2O
BNP SERPL-MCNC: >5000 PG/ML
BUN BLD-MCNC: 30 MG/DL (ref 9–23)
BUN/CREAT SERPL: 20.4 (ref 10–20)
CALCIUM BLD-MCNC: 9.4 MG/DL (ref 8.7–10.4)
CHLORIDE SERPL-SCNC: 104 MMOL/L (ref 98–112)
CO2 SERPL-SCNC: 20 MMOL/L (ref 21–32)
CREAT BLD-MCNC: 1.47 MG/DL
DEPRECATED RDW RBC AUTO: 67.2 FL (ref 35.1–46.3)
EGFRCR SERPLBLD CKD-EPI 2021: 36 ML/MIN/1.73M2 (ref 60–?)
EOSINOPHIL # BLD AUTO: 0.07 X10(3) UL (ref 0–0.7)
EOSINOPHIL NFR BLD AUTO: 1.3 %
ERYTHROCYTE [DISTWIDTH] IN BLOOD BY AUTOMATED COUNT: 18.7 % (ref 11–15)
GLOBULIN PLAS-MCNC: 3.6 G/DL (ref 2–3.5)
GLUCOSE BLD-MCNC: 91 MG/DL (ref 70–99)
GLUCOSE BLDC GLUCOMTR-MCNC: 106 MG/DL (ref 70–99)
HCO3 BLDV-SCNC: 18 MEQ/L (ref 22–26)
HCT VFR BLD AUTO: 27.7 %
HGB BLD-MCNC: 8.3 G/DL
IMM GRANULOCYTES # BLD AUTO: 0.05 X10(3) UL (ref 0–1)
IMM GRANULOCYTES NFR BLD: 0.9 %
LYMPHOCYTES # BLD AUTO: 0.94 X10(3) UL (ref 1–4)
LYMPHOCYTES NFR BLD AUTO: 17.8 %
MCH RBC QN AUTO: 30.9 PG (ref 26–34)
MCHC RBC AUTO-ENTMCNC: 30 G/DL (ref 31–37)
MCV RBC AUTO: 103 FL
MONOCYTES # BLD AUTO: 0.52 X10(3) UL (ref 0.1–1)
MONOCYTES NFR BLD AUTO: 9.8 %
NEUTROPHILS # BLD AUTO: 3.67 X10 (3) UL (ref 1.5–7.7)
NEUTROPHILS # BLD AUTO: 3.67 X10(3) UL (ref 1.5–7.7)
NEUTROPHILS NFR BLD AUTO: 69.4 %
O2/TOTAL GAS SETTING VFR VENT: 60 %
OSMOLALITY SERPL CALC.SUM OF ELEC: 282 MOSM/KG (ref 275–295)
PCO2 BLDV: 43 MM HG (ref 38–50)
PH BLDV: 7.26 [PH] (ref 7.32–7.43)
PLATELET # BLD AUTO: 303 10(3)UL (ref 150–450)
PLATELET MORPHOLOGY: NORMAL
PLATELETS.RETICULATED NFR BLD AUTO: 7 % (ref 0–7)
PO2 BLDV: 35 MM HG (ref 35–40)
POTASSIUM SERPL-SCNC: 5.9 MMOL/L (ref 3.5–5.1)
PROT SERPL-MCNC: 7.8 G/DL (ref 5.7–8.2)
PUNCTURE CHARGE: NO
RBC # BLD AUTO: 2.69 X10(6)UL
SAO2 % BLDV: 51.1 % (ref 60–85)
SODIUM SERPL-SCNC: 133 MMOL/L (ref 136–145)
TROPONIN I SERPL HS-MCNC: 24 NG/L
WBC # BLD AUTO: 5.3 X10(3) UL (ref 4–11)

## 2024-05-24 PROCEDURE — 5A09357 ASSISTANCE WITH RESPIRATORY VENTILATION, LESS THAN 24 CONSECUTIVE HOURS, CONTINUOUS POSITIVE AIRWAY PRESSURE: ICD-10-PCS | Performed by: EMERGENCY MEDICINE

## 2024-05-24 PROCEDURE — 99291 CRITICAL CARE FIRST HOUR: CPT | Performed by: INTERNAL MEDICINE

## 2024-05-24 PROCEDURE — 71045 X-RAY EXAM CHEST 1 VIEW: CPT | Performed by: EMERGENCY MEDICINE

## 2024-05-24 RX ORDER — AMIODARONE HYDROCHLORIDE 200 MG/1
200 TABLET ORAL DAILY
Status: DISCONTINUED | OUTPATIENT
Start: 2024-05-24 | End: 2024-06-03

## 2024-05-24 RX ORDER — PANTOPRAZOLE SODIUM 40 MG/1
40 TABLET, DELAYED RELEASE ORAL
Status: DISCONTINUED | OUTPATIENT
Start: 2024-05-25 | End: 2024-06-03

## 2024-05-24 RX ORDER — MIRTAZAPINE 7.5 MG/1
7.5 TABLET, FILM COATED ORAL NIGHTLY
Status: DISCONTINUED | OUTPATIENT
Start: 2024-05-24 | End: 2024-06-03

## 2024-05-24 RX ORDER — MAGNESIUM OXIDE 400 MG (241.3 MG MAGNESIUM) TABLET
325 TABLET
Status: DISCONTINUED | OUTPATIENT
Start: 2024-05-25 | End: 2024-05-26

## 2024-05-24 RX ORDER — ONDANSETRON 2 MG/ML
4 INJECTION INTRAMUSCULAR; INTRAVENOUS EVERY 6 HOURS PRN
Status: DISCONTINUED | OUTPATIENT
Start: 2024-05-24 | End: 2024-06-03

## 2024-05-24 RX ORDER — MELATONIN
800 DAILY
Status: DISCONTINUED | OUTPATIENT
Start: 2024-05-24 | End: 2024-06-03

## 2024-05-24 RX ORDER — ACETAMINOPHEN 325 MG/1
650 TABLET ORAL EVERY 4 HOURS PRN
Status: DISCONTINUED | OUTPATIENT
Start: 2024-05-24 | End: 2024-06-03

## 2024-05-24 RX ORDER — CARVEDILOL 3.12 MG/1
3.12 TABLET ORAL 2 TIMES DAILY WITH MEALS
Status: DISCONTINUED | OUTPATIENT
Start: 2024-05-24 | End: 2024-06-03

## 2024-05-24 RX ORDER — FUROSEMIDE 10 MG/ML
40 INJECTION INTRAMUSCULAR; INTRAVENOUS
Status: DISCONTINUED | OUTPATIENT
Start: 2024-05-25 | End: 2024-05-26

## 2024-05-24 RX ORDER — HYDROCODONE BITARTRATE AND ACETAMINOPHEN 5; 325 MG/1; MG/1
1 TABLET ORAL EVERY 4 HOURS PRN
Status: DISCONTINUED | OUTPATIENT
Start: 2024-05-24 | End: 2024-06-03

## 2024-05-24 RX ORDER — ACETAMINOPHEN 500 MG
500 TABLET ORAL EVERY 4 HOURS PRN
Status: DISCONTINUED | OUTPATIENT
Start: 2024-05-24 | End: 2024-05-31

## 2024-05-24 RX ORDER — FUROSEMIDE 10 MG/ML
40 INJECTION INTRAMUSCULAR; INTRAVENOUS ONCE
Status: COMPLETED | OUTPATIENT
Start: 2024-05-24 | End: 2024-05-24

## 2024-05-24 RX ORDER — HYDROCODONE BITARTRATE AND ACETAMINOPHEN 5; 325 MG/1; MG/1
2 TABLET ORAL EVERY 4 HOURS PRN
Status: DISCONTINUED | OUTPATIENT
Start: 2024-05-24 | End: 2024-06-03

## 2024-05-24 RX ORDER — HEPARIN SODIUM 5000 [USP'U]/ML
5000 INJECTION, SOLUTION INTRAVENOUS; SUBCUTANEOUS EVERY 8 HOURS SCHEDULED
Status: DISCONTINUED | OUTPATIENT
Start: 2024-05-24 | End: 2024-05-28

## 2024-05-24 NOTE — ED PROVIDER NOTES
Patient Seen in: [facility] Emergency Department      History     Chief Complaint   Patient presents with    Difficulty Breathing     Stated Complaint: sob    Subjective:   HPI    Patient presents the emergency department in severe respiratory distress.  She is having difficulty answering questions due to her condition requiring continuous BiPAP.  Reportedly she became very short of breath at home and has a history of heart failure and COPD.  There is no fever.  There is no other aggravating or alleviating factors.    Objective:   No pertinent past medical history.            No pertinent past surgical history.              No pertinent social history.            Review of Systems    Positive for stated complaint: sob  Other systems are as noted in HPI.  Constitutional and vital signs reviewed.      All other systems reviewed and negative except as noted above.    Physical Exam     ED Triage Vitals [05/24/24 1724]   BP (!) 153/91   Pulse 91   Resp (!) 31   Temp 97.6 °F (36.4 °C)   Temp src Axillary   SpO2 100 %   O2 Device Bi-PAP       Current Vitals:   Vital Signs  BP: 145/75  Pulse: 82  Resp: (!) 29  Temp: 97.6 °F (36.4 °C)  Temp src: Axillary  MAP (mmHg): 96    Oxygen Therapy  SpO2: 100 %  O2 Device: Bi-PAP            Physical Exam  Vitals and nursing note reviewed.   Constitutional:       General: She is not in acute distress.     Appearance: She is well-developed.   HENT:      Head: Normocephalic.      Nose: Nose normal.      Mouth/Throat:      Mouth: Mucous membranes are moist.   Eyes:      Conjunctiva/sclera: Conjunctivae normal.   Cardiovascular:      Rate and Rhythm: Normal rate and regular rhythm.      Heart sounds: No murmur heard.  Pulmonary:      Effort: Pulmonary effort is normal. No respiratory distress.      Breath sounds: Decreased breath sounds, rhonchi and rales present.   Abdominal:      General: There is no distension.      Palpations: Abdomen is soft.      Tenderness: There is no  abdominal tenderness.   Musculoskeletal:         General: No tenderness. Normal range of motion.      Cervical back: Normal range of motion and neck supple.   Skin:     General: Skin is warm and dry.      Capillary Refill: Capillary refill takes less than 2 seconds.      Findings: No rash.   Neurological:      Mental Status: She is alert.               ED Course     Labs Reviewed   COMP METABOLIC PANEL (14) - Abnormal; Notable for the following components:       Result Value    Sodium 133 (*)     Potassium 5.9 (*)     CO2 20.0 (*)     BUN 30 (*)     Creatinine 1.47 (*)     BUN/CREA Ratio 20.4 (*)     eGFR-Cr 36 (*)     AST 41 (*)     Alkaline Phosphatase 244 (*)     Globulin  3.6 (*)     All other components within normal limits   BNP (B TYPE NATRIURETIC PEPTIDE) - Abnormal; Notable for the following components:    Beta Natriuretic Peptide >5,000 (*)     All other components within normal limits   RBC MORPHOLOGY SCAN - Abnormal; Notable for the following components:    RBC Morphology See morphology below (*)     All other components within normal limits   VENOUS BLOOD GAS - Abnormal; Notable for the following components:    Venous pH 7.26 (*)     Venous Bicarbonate 18.0 (*)     Blood Gas Base Excess -7.5 (*)     Venous O2 Saturation 51.1 (*)     All other components within normal limits   POCT GLUCOSE - Abnormal; Notable for the following components:    POC Glucose  106 (*)     All other components within normal limits   CBC W/ DIFFERENTIAL - Abnormal; Notable for the following components:    RBC 2.69 (*)     HGB 8.3 (*)     HCT 27.7 (*)     .0 (*)     MCHC 30.0 (*)     RDW-SD 67.2 (*)     RDW 18.7 (*)     Lymphocyte Absolute 0.94 (*)     All other components within normal limits   TROPONIN I HIGH SENSITIVITY - Normal   CBC WITH DIFFERENTIAL WITH PLATELET    Narrative:     The following orders were created for panel order CBC With Differential With Platelet.  Procedure                                Abnormality         Status                     ---------                               -----------         ------                     CBC W/ DIFFERENTIAL[[id number]]          Abnormal            Final result                 Please view results for these tests on the individual orders.   SCAN SLIDE   ARTERIAL BLOOD GAS   URINALYSIS, ROUTINE   RAINBOW DRAW BLUE     EKG    Rate, intervals and axes as noted on EKG Report.  Rate: 70 bpm  Rhythm: Paced rhythm  Reading: Insufficient data                          MDM        Admission disposition: 5/24/2024 10:07 PM                    Medical Decision Making  Differential diagnosis considered for decompensated heart failure, COPD, pneumonia, pleural effusion, electrolyte disturbance.    Problems Addressed:  Acute respiratory failure with hypoxia (HCC): acute illness or injury  Hyperkalemia: acute illness or injury  Renal insufficiency: acute illness or injury    Amount and/or Complexity of Data Reviewed  Labs: ordered. Decision-making details documented in ED Course.     Details: Laboratory studies show mild hyperkalemia and chronic renal insufficiency.  BNP significantly elevated.  Radiology: ordered and independent interpretation performed. Decision-making details documented in ED Course.     Details: Chest x-ray shows decompensated congestive heart failure  ECG/medicine tests: ordered and independent interpretation performed. Decision-making details documented in ED Course.  Discussion of management or test interpretation with external provider(s): Patient was given Lasix and Lokelma.  Will admit to the PCU as he is on BiPAP for oxygenation purposes.  Consulted cardiology, pulmonology and seen by the hospitalist in the emergency department.    Risk  Prescription drug management.  Decision regarding hospitalization.  Risk Details:   Critical Care Documentation    There were greater than 30 minutes of critical care time spent directly on this patient and this time did not  include procedures but did include:    7 minutes for documentation  10 minutes for physical exam, re-examination and discussing results with patient and family  10 minutes discussing case with admitting physicians and consultants  5 minutes interpreting labs and diagnostic testing        Critical Care  Total time providing critical care: 32 minutes        Disposition and Plan     Clinical Impression:  1. Acute respiratory failure with hypoxia (HCC)    2. Renal insufficiency    3. Hyperkalemia         Disposition:  Admit  5/24/2024 10:07 pm    Follow-up:  No follow-up provider specified.  We recommend that you schedule follow up care with a primary care provider within the next three months to obtain basic health screening including reassessment of your blood pressure.      Medications Prescribed:  Current Discharge Medication List                            Hospital Problems       Present on Admission  Date Reviewed: 5/2/2024            ICD-10-CM Noted POA    * (Principal) Acute respiratory failure with hypoxia (HCC) J96.01 5/24/2024 Unknown

## 2024-05-24 NOTE — ED INITIAL ASSESSMENT (HPI)
Pt to the ed via ems for reported increased shortness of breath the began last night  Per ems pt was unable to tolerate nrb upon arrival  Placed on bipap spo2 100%  Work of breathing improved

## 2024-05-24 NOTE — H&P
[facility]  part of [facility]  HISTORY AND PHYSICAL       [place] Jonny Silverio Patient Status:  Emergency    3/14/1945  79 year old CSN [id number]   Location  Attending Mauro Clark MD     PCP Johnathon Rodriguez,          DATE OF ADMISSION: 2024     CHIEF COMPLAINT: Shortness of breath    HISTORY OF PRESENT ILLNESS  This is a 79 year oldfemale who presented complaining of shortness of breath.  Patient stated symptoms started on the evening prior to admission.  Progressively worsening on the day of admission prompting visit for ED for further evaluation.  Patient denies associated cough or chest pain.  Patient denied associated lower extremity edema.  Patient denied recent sick contacts, fevers or chills.  Of note, patient was recently admitted to the hospital and discharged on .  At that time she was being treated for hypotension with hypovolemia and nonischemic cardiomyopathy.    PAST MEDICAL HISTORY  Past Medical History:    Acute kidney insufficiency    Anemia    Arrhythmia    CHF (congestive heart failure) (HCC)    CKD (chronic kidney disease) stage 3, GFR 30-59 ml/min (HCC)    Deep vein thrombosis (HCC)    Essential hypertension    History of blood transfusion    Rheumatoid arthritis (HCC)    Seizure disorder (HCC)        PAST SURGICAL HISTORY  Past Surgical History:   Procedure Laterality Date    Colonoscopy N/A 2024    Dr. Rios    Colonoscopy N/A 2024    Procedure: COLONOSCOPY;  Surgeon: Zoraida Rios MD;  Location: [facility] ENDOSCOPY    Egd N/A 2024    Dr. Rios    Other surgical history      Heart Surgery     Other surgical history      Bilateral shoulder replacement        ALLERGIES   Lisinopril    MEDICATIONS  Patient's Medications   New Prescriptions    No medications on file   Previous Medications    ALENDRONATE 70 MG ORAL TAB    Take 1 tablet (70 mg total) by mouth once a week.    AMIODARONE 200 MG ORAL TAB    Take 1 tablet (200 mg total)  by mouth daily.    CARVEDILOL 3.125 MG ORAL TAB    Take 1 tablet (3.125 mg total) by mouth 2 (two) times daily with meals.    FERROUS SULFATE 325 (65 FE) MG ORAL TAB EC    Take 1 tablet (325 mg total) by mouth daily with breakfast.    FOLIC ACID 800 MCG ORAL TAB    Take 1 tablet (800 mcg total) by mouth daily.    HYDROCODONE-ACETAMINOPHEN (NORCO)  MG ORAL TAB    Take 1 tablet by mouth every 6 (six) hours as needed for Pain.    METHOTREXATE 2.5 MG ORAL TAB    TAKE 6 TABLETS BY MOUTH 1 TIME A WEEK    MIRTAZAPINE 7.5 MG ORAL TAB    Take 1 tablet (7.5 mg total) by mouth nightly.    PANTOPRAZOLE 40 MG ORAL TAB EC    TAKE 1 TABLET(40 MG) BY MOUTH TWICE DAILY BEFORE MEALS    SACUBITRIL-VALSARTAN 49-51 MG ORAL TAB    Take 1 tablet by mouth 2 (two) times daily.   Modified Medications    No medications on file   Discontinued Medications    CIPROFLOXACIN (CIPRO) 250 MG ORAL TAB    Take 1 tablet (250 mg total) by mouth 2 (two) times daily for 5 days.       SOCIAL HISTORY  Social History     Socioeconomic History    Marital status:    Tobacco Use    Smoking status: Former     Current packs/day: 0.00     Types: Cigarettes     Quit date: 2014     Years since quitting: 10.4    Smokeless tobacco: Never   Vaping Use    Vaping status: Never Used   Substance and Sexual Activity    Alcohol use: Never    Drug use: Never       FAMILY HISTORY  No family history on file.    PHYSICAL EXAM  Vital signs:  BP (!) 153/91   Pulse 91   Temp 97.6 °F (36.4 °C) (Axillary)   Resp (!) 31   Wt 125 lb 10.6 oz (57 kg)   SpO2 100%   BMI 21.57 kg/m²      Patient seen on noninvasive ventilation  GENERAL: Thin, ill-appearing female.  Awake and alert, in no acute distress.  HEART:  Regular rhythm.  Mild tachycardia  LUNGS:  Air entry was decreased.  Mild increased work of breathing.  ABDOMEN: Soft and non-tender.    EXTREMITIES: No edema appreciated  PSYCHIATRIC: Normal mood      IMAGING  No results found.    Data:  Recent Labs   Lab  05/18/24  0528 05/24/24  1750   RBC 2.28* 2.69*   HGB 7.0* 8.3*   HCT 22.6* 27.7*   MCV 99.1 103.0*   MCH 30.7 30.9   MCHC 31.0 30.0*   RDW 17.8* 18.7*   NEPRELIM 2.96 3.67   WBC 4.3 5.3   .0 303.0     Recent Labs   Lab 05/18/24  0528   *   BUN 41*   CREATSERUM 1.48*   CA 9.3      K 4.9      CO2 23.0       ASSESSMENT/PLAN      Acute respiratory failure with hypoxia (HCC)  -Patient presenting with shortness of breath  -In ED noted to have increased work of breathing and hypoxia  -Patient was started on noninvasive ventilation with improvement of symptoms.  -Chest x-ray reviewed.  Noted moderate pulmonary edema, trace bilateral pleural effusions both of which appear unchanged from recent imaging.  Patient also with right lower lobe airspace opacity which again appeared unchanged from previous, likely atelectasis.  -BNP noted to be grossly elevated, greater than 5000  -Starting IV diuresis  -Pulmonology and cardiology consulted, appreciate recommendations    Acute on chronic heart failure with severely reduced ejection fraction   - Last known EF 15%  -Starting diuresis with Lasix 40mg IV BID  - Strict I&Os, Daily weights, Fluid restriction  - Cardiology on consult, appreciate recs.     Hyperkalemia  -in setting of CKD  -Telemetry monitoring  -S/p Lasix  -Recheck  -Nephrology on consult     CKD  III   -Kidney function near baseline  - Starting IVF  - Avoiding Nephrotoxic agents  - Cont to monitor    Chronic anemia  -Without signs of acute blood loss  -Hemoglobin improved from previous  -Continue to monitor      Plan of care discussed with patient and family at bedside.  Discussed with ED physician and RN. Decision made that pt needs hospitalization for further management/monitoring.    Patient critically ill 2/2 acute respiratory failure requiring continuous NIV. Admit to ICU. Critical care time greater than 35 mins.       Kalia Rogers MD    This note was prepared using [employer]  voice recognition dictation software. As a result errors may occur. When identified these errors have been corrected. While every attempt is made to correct errors during dictation discrepancies may still exist

## 2024-05-25 LAB
ALBUMIN SERPL-MCNC: 3.6 G/DL (ref 3.2–4.8)
ALBUMIN/GLOB SERPL: 1.1 {RATIO} (ref 1–2)
ALP LIVER SERPL-CCNC: 209 U/L
ALT SERPL-CCNC: 24 U/L
ANION GAP SERPL CALC-SCNC: 12 MMOL/L (ref 0–18)
AST SERPL-CCNC: 32 U/L (ref ?–34)
ATRIAL RATE: 70 BPM
BASOPHILS # BLD AUTO: 0.04 X10(3) UL (ref 0–0.2)
BASOPHILS NFR BLD AUTO: 0.8 %
BILIRUB SERPL-MCNC: 1.1 MG/DL (ref 0.2–1.1)
BILIRUB UR QL: NEGATIVE
BUN BLD-MCNC: 26 MG/DL (ref 9–23)
BUN/CREAT SERPL: 17 (ref 10–20)
CALCIUM BLD-MCNC: 9.1 MG/DL (ref 8.7–10.4)
CHLORIDE SERPL-SCNC: 107 MMOL/L (ref 98–112)
CLARITY UR: CLEAR
CO2 SERPL-SCNC: 17 MMOL/L (ref 21–32)
CREAT BLD-MCNC: 1.53 MG/DL
DEPRECATED RDW RBC AUTO: 60.1 FL (ref 35.1–46.3)
EGFRCR SERPLBLD CKD-EPI 2021: 35 ML/MIN/1.73M2 (ref 60–?)
EOSINOPHIL # BLD AUTO: 0.09 X10(3) UL (ref 0–0.7)
EOSINOPHIL NFR BLD AUTO: 1.8 %
ERYTHROCYTE [DISTWIDTH] IN BLOOD BY AUTOMATED COUNT: 17.7 % (ref 11–15)
GLOBULIN PLAS-MCNC: 3.2 G/DL (ref 2–3.5)
GLUCOSE BLD-MCNC: 79 MG/DL (ref 70–99)
GLUCOSE UR-MCNC: NORMAL MG/DL
HCT VFR BLD AUTO: 23.7 %
HGB BLD-MCNC: 7.4 G/DL
HGB UR QL STRIP.AUTO: NEGATIVE
IMM GRANULOCYTES # BLD AUTO: 0.04 X10(3) UL (ref 0–1)
IMM GRANULOCYTES NFR BLD: 0.8 %
KETONES UR-MCNC: NEGATIVE MG/DL
LEUKOCYTE ESTERASE UR QL STRIP.AUTO: NEGATIVE
LYMPHOCYTES # BLD AUTO: 1 X10(3) UL (ref 1–4)
LYMPHOCYTES NFR BLD AUTO: 20.2 %
MAGNESIUM SERPL-MCNC: 2.2 MG/DL (ref 1.6–2.6)
MCH RBC QN AUTO: 30.7 PG (ref 26–34)
MCHC RBC AUTO-ENTMCNC: 31.2 G/DL (ref 31–37)
MCV RBC AUTO: 98.3 FL
MONOCYTES # BLD AUTO: 0.45 X10(3) UL (ref 0.1–1)
MONOCYTES NFR BLD AUTO: 9.1 %
MRSA DNA SPEC QL NAA+PROBE: NEGATIVE
NEUTROPHILS # BLD AUTO: 3.33 X10 (3) UL (ref 1.5–7.7)
NEUTROPHILS # BLD AUTO: 3.33 X10(3) UL (ref 1.5–7.7)
NEUTROPHILS NFR BLD AUTO: 67.3 %
NITRITE UR QL STRIP.AUTO: NEGATIVE
OSMOLALITY SERPL CALC.SUM OF ELEC: 286 MOSM/KG (ref 275–295)
P AXIS: 101 DEGREES
P-R INTERVAL: 202 MS
PH UR: 6 [PH] (ref 5–8)
PLATELET # BLD AUTO: 273 10(3)UL (ref 150–450)
POTASSIUM SERPL-SCNC: 5.1 MMOL/L (ref 3.5–5.1)
PROT SERPL-MCNC: 6.8 G/DL (ref 5.7–8.2)
PROT UR-MCNC: NEGATIVE MG/DL
Q-T INTERVAL: 496 MS
QRS DURATION: 156 MS
QTC CALCULATION (BEZET): 535 MS
R AXIS: -57 DEGREES
RBC # BLD AUTO: 2.41 X10(6)UL
SODIUM SERPL-SCNC: 136 MMOL/L (ref 136–145)
SP GR UR STRIP: 1.01 (ref 1–1.03)
T AXIS: -76 DEGREES
UROBILINOGEN UR STRIP-ACNC: NORMAL
VENTRICULAR RATE: 70 BPM
WBC # BLD AUTO: 5 X10(3) UL (ref 4–11)

## 2024-05-25 PROCEDURE — 99223 1ST HOSP IP/OBS HIGH 75: CPT | Performed by: INTERNAL MEDICINE

## 2024-05-25 PROCEDURE — 99233 SBSQ HOSP IP/OBS HIGH 50: CPT | Performed by: HOSPITALIST

## 2024-05-25 RX ORDER — IPRATROPIUM BROMIDE AND ALBUTEROL SULFATE 2.5; .5 MG/3ML; MG/3ML
3 SOLUTION RESPIRATORY (INHALATION) EVERY 6 HOURS PRN
Status: DISCONTINUED | OUTPATIENT
Start: 2024-05-25 | End: 2024-06-03

## 2024-05-25 NOTE — OCCUPATIONAL THERAPY NOTE
OCCUPATIONAL THERAPY EVALUATION - INPATIENT     Room Number: 512/512-A  Evaluation Date: 5/25/2024  Type of Evaluation: Initial  Presenting Problem: CHF, respiratory failure, pulm edema    Physician Order: IP Consult to Occupational Therapy  Reason for Therapy: ADL/IADL Dysfunction and Discharge Planning    OCCUPATIONAL THERAPY ASSESSMENT   Patient is a 79 year old female admitted 5/24/2024.  Prior to admission, patient's baseline is intermittent assist.  Patient is currently functioning below baseline with ADLs/mobility.  Patient is requiring contact guard assist and moderate assist as a result of the following impairments: decreased functional strength, decreased functional reach, decreased endurance, pain, impaired   balance, impaired coordination, decreased muscular endurance, and limited B hand ROM with arthritis. Occupational Therapy will continue to follow for duration of hospitalization.    Patient will benefit from continued skilled OT Services at discharge to promote functional independence and safety with additional support and return home with home health OT    PLAN  OT Treatment Plan: Balance activities;Energy conservation/work simplification techniques;ADL training;Functional transfer training;UE strengthening/ROM;Endurance training;Patient/Family education;Patient/Family training;Equipment eval/education;Compensatory technique education     OCCUPATIONAL THERAPY MEDICAL/SOCIAL HISTORY   Problem List  Principal Problem:    Acute respiratory failure with hypoxia (HCC)       HOME SITUATION  Type of Home: House  Home Layout: One level  Lives With: Spouse  Toilet and Equipment: Standard height toilet  Shower/Tub and Equipment: Tub-shower combo  Hand Dominance: Right  Drives: No  Patient Regularly Uses: Glasses  Use of Assistive Device(s): RW     Prior Level of [place]: intermittent assist with ADLs and IADLs 2/2 severe RA. Reported receiving HH therapy     SUBJECTIVE  \"Do you have a warm  blanket\"    OCCUPATIONAL THERAPY EXAMINATION    OBJECTIVE  Precautions: Bed/chair alarm  Fall Risk: High fall risk    PAIN ASSESSMENT  Rating: Unable to rate  Location: neck pain radiating to posterior head  Management Techniques: Nurse notified (RN provided pain meds prior to session)    ACTIVITY TOLERANCE  Pulse: 99          O2 SATURATIONS  Oxygen Therapy  SPO2% on Room Air at Rest: 99    COGNITION  Overall Cognitive Status:  WFL - within functional limits    VISION  WFL    Communication: WFL    Behavioral/Emotional/Social: WFL    RANGE OF MOTION   Upper extremity ROM limited 2/2 arthritic changes, americo in B hands    STRENGTH ASSESSMENT  Upper extremity strength grossly 4/5    COORDINATION  Gross Motor: WFL   Fine Motor: impaired with chronic arthritic changes B hands    ACTIVITIES OF DAILY LIVING ASSESSMENT  AM-PAC ‘6-Clicks’ Inpatient Daily Activity Short Form  How much help from another person does the patient currently need…  -   Putting on and taking off regular lower body clothing?: A Lot  -   Bathing (including washing, rinsing, drying)?: A Lot  -   Toileting, which includes using toilet, bedpan or urinal? : A Little  -   Putting on and taking off regular upper body clothing?: A Little  -   Taking care of personal grooming such as brushing teeth?: A Little  -   Eating meals?: A Little    AM-PAC Score:  Score: 16  Approx Degree of Impairment: 53.32%  Standardized Score (AM-PAC Scale): 35.96  CMS Modifier (G-Code): CK    FUNCTIONAL TRANSFER ASSESSMENT  Sit to Stand: Edge of Bed  Edge of Bed: Contact Guard Assist    BED MOBILITY  Supine to Sit : Contact Guard Assist    BALANCE ASSESSMENT  Static Sitting: Stand-by Assist  Static Standing: Contact Guard Assist    FUNCTIONAL ADL ASSESSMENT  Grooming Seated: Contact Guard Assist  LB Dressing Seated: Moderate Assist  Toileting Seated: Maximum Assist    Skilled Therapy Provided: RN approved session,  present. Performed ADLs and mobility as stated above.  Reported slight SOB after short distance ambulation with CGA/RW. Left in chair with needs met, alarm on and provided warm blankets.     EDUCATION PROVIDED  Patient: Role of Occupational Therapy; Functional Transfer Techniques; Posture/Positioning; Compensatory ADL Techniques; Energy Conservation  Patient's Response to Education: Verbalized Understanding; Returned Demonstration  Family/Caregiver: Role of Occupational Therapy; Plan of Care; Functional Transfer Techniques; Energy Conservation; Compensatory ADL Techniques  Family/Caregiver's Response to Education: Verbalized Understanding; Returned Demonstration    The patient's Approx Degree of Impairment: 53.32% has been calculated based on documentation in the [facility] '6 clicks' Inpatient Daily Activity Short Form.  Research supports that patients with this level of impairment may benefit from rehab however pt has good support at home and functional mobility is close to baseline, may benefit from HHOT.  Final disposition will be made by interdisciplinary medical team.     Patient End of Session: Up in chair;Needs met;Call light within reach;RN aware of session/findings;All patient questions and concerns addressed;Alarm set;Family present    OT Goals  Patients self stated goal is: improved breathing with ADLs     Patient will complete functional transfer with mod I  Comment:     Patient will complete toileting with mod I  Comment:     Patient will tolerate standing for 5 minutes in prep for adls with mod I   Comment:    Patient incorporate at least 5 energy conservation techs during ADLs with mod I  Comment:          Goals  on: 2024  Frequency: 3-5x/wk    Patient Evaluation Complexity Level:   Occupational Profile/Medical History MODERATE - Expanded review of history including review of medical or therapy record   Specific performance deficits impacting engagement in ADL/IADL LOW  1 - 3 performance deficits    Client Assessment/Performance Deficits MODERATE -  Comorbidities and min to mod modifications of tasks    Clinical Decision Making LOW - Analysis of occupational profile, problem-focused assessments, limited treatment options    Overall Complexity LOW     OT Session Time: 15 minutes  15 min eilsha Magana, OTR/L

## 2024-05-25 NOTE — PLAN OF CARE
Patient A%Ox4. Came to PCCU on nonrebreather, was on BIPAP in ED. Patient weaned off of nonrebreather to nasal cannula, and then eventually room air. Patient reports no shortness or breath or increased work of breathing, SpO2 100% on room air. All other vital signs stable, afebrile. Received a dose of lasix in the ED, good urine output via purewick. Potassium 5.9, lokelma given, potassium came down to 5.1. SCD's in place. Patient's  at bedside, updated on plan of care. Patient comfortable with no complaints at this time. All appropriate safety measures in place.     Problem: Patient Centered Care  Goal: Patient preferences are identified and integrated in the patient's plan of care  Description: Interventions:  - What would you like us to know as we care for you? I have been here before  - Provide timely, complete, and accurate information to patient/family  - Incorporate patient and family knowledge, values, beliefs, and cultural backgrounds into the planning and delivery of care  - Encourage patient/family to participate in care and decision-making at the level they choose  - Honor patient and family perspectives and choices  Outcome: Progressing     Problem: Patient/Family Goals  Goal: Patient/Family Long Term Goal  Description: Patient's Long Term Goal: To go home    Interventions:  - Encourage participation in care plan  - See additional Care Plan goals for specific interventions  Outcome: Progressing  Goal: Patient/Family Short Term Goal  Description: Patient's Short Term Goal: To be able to breathe without extra oxygen support    Interventions:   - Encourage participation in care plan  - See additional Care Plan goals for specific interventions  Outcome: Progressing     Problem: RESPIRATORY - ADULT  Goal: Achieves optimal ventilation and oxygenation  Description: INTERVENTIONS:  - Assess for changes in respiratory status  - Assess for changes in mentation and behavior  - Position to facilitate  oxygenation and minimize respiratory effort  - Oxygen supplementation based on oxygen saturation or ABGs  - Provide Smoking Cessation handout, if applicable  - Encourage broncho-pulmonary hygiene including cough, deep breathe, Incentive Spirometry  - Assess the need for suctioning and perform as needed  - Assess and instruct to report SOB or any respiratory difficulty  - Respiratory Therapy support as indicated  - Manage/alleviate anxiety  - Monitor for signs/symptoms of CO2 retention  Outcome: Progressing

## 2024-05-25 NOTE — CONSULTS
Cardiology Consult Note    Margaret Silverio Patient Status:  Inpatient    3/14/1945 MRN [id number]   Location [facility] 2W/SW Attending Kalia Rogers MD   Hosp Day # 1 PCP Johnathon Rodriguez DO     The patient is a pleasant 79-year-old -American female with history of hypertension, rheumatoid arthritis, atrial fibrillation, seizure disorder and chronic CHF who presented to the ED for shortness of breath which became progressively worse prompting her to come to the ED. The symptoms started 2 days back.  She denies any chest pain or cough.  No history of fever.  Patient was recently admitted to hospital for similar symptoms.  She has ICD placed around 1 year back for reduced EF.     ED work up  EKG: A sensed V paced rhythm  BNP: > 5000  Troponin I  24  CXR: Moderate pulmonary edema  Assessment:       HFR EF S/p ICD  Acute exacerbation  BNP > 5000  Troponin I 24    Afib S/p A sensed and V paced rhythm  Anticoagulants on hold  Planned for Trovebox devise    H/o HTN    RA    GEORGE on CKD    Plan:  Lasix IV  Amiodarone po  Coreg  Entresto  Dvt prophylaxis    Smooth Swain MD  [facility]  L C5    --------------------------------------------------------------------------------------------------------------------------------  ROS 10 systems reviewed, pertinent findings above.  ROS    History:  Past Medical History:    Acute kidney insufficiency    Anemia    Arrhythmia    CHF (congestive heart failure) (HCC)    CKD (chronic kidney disease) stage 3, GFR 30-59 ml/min (HCC)    Deep vein thrombosis (HCC)    Essential hypertension    History of blood transfusion    Rheumatoid arthritis (HCC)    Seizure disorder (HCC)     Past Surgical History:   Procedure Laterality Date    Colonoscopy N/A 2024    Dr. Rios    Colonoscopy N/A 2024    Procedure: COLONOSCOPY;  Surgeon: Zoraida Rios MD;  Location: [place] [facility]    Egd N/A 2024    Dr. Rios    Other surgical history   2017    Heart Surgery     Other surgical history  2020    Bilateral shoulder replacement      No family history on file.   reports that she quit smoking about 10 years ago. Her smoking use included cigarettes. She has never used smokeless tobacco. She reports that she does not drink alcohol and does not use drugs.    Objective:   Temp: 97.4 °F (36.3 °C)  Pulse: 100  Resp: 24  BP: 118/77    Intake/Output:     Intake/Output Summary (Last 24 hours) at 5/25/2024 0729  Last data filed at 5/25/2024 0504  Gross per 24 hour   Intake 260 ml   Output 500 ml   Net -240 ml       Physical Exam:     General: Awake and alert  Lying flat in the bed   On supplemental oxygen nasal cannula  HEENT: Normocephalic, anicteric sclera, neck supple.  Neck: No JVD, carotids 2+, no bruits.  Cardiac: Regular rate and rhythm. S1, S2 normal. No murmur, pericardial rub, S3.  Lungs: Air entry plus bilateral, scattered rales present   Abdomen: Soft, non-tender. BS-present.  Extremities: Without clubbing, cyanosis or edema.  Peripheral pulses are 2+.  Neurologic: Non-focal  Skin: Warm and dry.       Smooth Swain MD  5/25/2024  7:29 AM

## 2024-05-25 NOTE — CONSULTS
[facility]  part of [facility]    Report of Consultation    Margaret Silverio Patient Status:  Inpatient    3/14/1945 MRN [id number]   Location [facility] 2W/SW Attending Raiza Alvarez MD   Hosp Day # 1 PCP Johnathon Rodriguez DO     Date of Admission:  2024  Date of Consult: 2024    Reason for Consultation:   Consults  CHF exacerbation with acute respiratory failure    History provided by:patient  HPI:     Chief Complaint   Patient presents with    Difficulty Breathing     HPI    This is a very pleasant 79-year-old female with history of severe CHF HFrEF LVEF 15 % , CKD , pacemaker , severe RA with joint deformities  Presented with increased dyspnea chest x-ray showed moderate edema with very high BNP and patient received Lasix and initially placed on BiPAP patient improved significantly and now she is off BiPAP on room air  Denied cough or sputum or fever  Denied hemoptysis  No focal weakness or numbness  Denied abdominal pain or vomiting or diarrhea    History     Past Medical History:    Acute kidney insufficiency    Anemia    Arrhythmia    CHF (congestive heart failure) (HCC)    CKD (chronic kidney disease) stage 3, GFR 30-59 ml/min (HCC)    Deep vein thrombosis (HCC)    Essential hypertension    History of blood transfusion    Rheumatoid arthritis (HCC)    Seizure disorder (HCC)     Past Surgical History:   Procedure Laterality Date    Colonoscopy N/A 2024    Dr. Rios    Colonoscopy N/A 2024    Procedure: COLONOSCOPY;  Surgeon: Zoraida Rios MD;  Location: [facility] ENDOSCOPY    Egd N/A 2024    Dr. Rios    Other surgical history      Heart Surgery     Other surgical history      Bilateral shoulder replacement      No family history on file.  Social History:  Social History     Socioeconomic History    Marital status:    Tobacco Use    Smoking status: Former     Current packs/day: 0.00     Types: Cigarettes     Quit date:      Years since  quitting: 10.4    Smokeless tobacco: Never   Vaping Use    Vaping status: Never Used   Substance and Sexual Activity    Alcohol use: Never    Drug use: Never     Social Determinants of Health     Financial Resource Strain: Low Risk  (2024)    Financial Resource Strain     Med Affordability: No   Food Insecurity: No Food Insecurity (2024)    Food Insecurity     Food Insecurity: Never true   Transportation Needs: No Transportation Needs (2024)    Transportation Needs     Lack of Transportation: No   Housing Stability: Low Risk  (2024)    Housing Stability     Housing Instability: No     Allergies/Medications:   Allergies:   Allergies   Allergen Reactions    Lisinopril Coughing     Medications Prior to Admission   Medication Sig    mirtazapine 7.5 MG Oral Tab Take 1 tablet (7.5 mg total) by mouth nightly.    PANTOPRAZOLE 40 MG Oral Tab EC TAKE 1 TABLET(40 MG) BY MOUTH TWICE DAILY BEFORE MEALS    CARVEDILOL 3.125 MG Oral Tab Take 1 tablet (3.125 mg total) by mouth 2 (two) times daily with meals.    SACUBITRIL-VALSARTAN 49-51 MG Oral Tab Take 1 tablet by mouth 2 (two) times daily.    folic acid 800 MCG Oral Tab Take 1 tablet (800 mcg total) by mouth daily.    amiodarone 200 MG Oral Tab Take 1 tablet (200 mg total) by mouth daily.    methotrexate 2.5 MG Oral Tab TAKE 6 TABLETS BY MOUTH 1 TIME A WEEK    alendronate 70 MG Oral Tab Take 1 tablet (70 mg total) by mouth once a week.    ferrous sulfate 325 (65 FE) MG Oral Tab EC Take 1 tablet (325 mg total) by mouth daily with breakfast.    HYDROcodone-acetaminophen (NORCO)  MG Oral Tab Take 1 tablet by mouth every 6 (six) hours as needed for Pain.    [] ciprofloxacin (CIPRO) 250 MG Oral Tab Take 1 tablet (250 mg total) by mouth 2 (two) times daily for 5 days.       Review of Systems:     Constitutional:  Positive for fatigue. Negative for fever.   HENT:  Negative for congestion.    Respiratory:  Negative for cough and wheezing.     Cardiovascular:  Positive for palpitations. Negative for chest pain.   Gastrointestinal: Negative.  Negative for abdominal pain and abdominal distention.   Neurological:  Negative for seizures.   Psychiatric/Behavioral:  Negative for agitation.        Physical Exam:   Vital Signs:   height is 5' 5\" (1.651 m) and weight is 123 lb 10.9 oz (56.1 kg). Her temporal temperature is 97.4 °F (36.3 °C). Her blood pressure is 118/77 and her pulse is 100. Her respiration is 24 and oxygen saturation is 100%.   Physical Exam  Constitutional:       General: She is not in acute distress.     Appearance: She is ill-appearing.   HENT:      Head: Atraumatic.      Nose: Nose normal.      Mouth/Throat:      Mouth: Mucous membranes are moist.   Eyes:      General: No scleral icterus.  Cardiovascular:      Rate and Rhythm: Normal rate.      Heart sounds: Murmur heard.      Gallop present.   Pulmonary:      Effort: No respiratory distress.      Breath sounds: No stridor. No wheezing, rhonchi or rales.   Abdominal:      General: Abdomen is flat. Bowel sounds are normal.      Palpations: Abdomen is soft.   Musculoskeletal:      Cervical back: No rigidity.      Right lower leg: No edema.      Left lower leg: No edema.   Skin:     General: Skin is dry.   Neurological:      General: No focal deficit present.         Results:     Lab Results   Component Value Date    WBC 5.0 05/25/2024    HGB 7.4 (L) 05/25/2024    HCT 23.7 (L) 05/25/2024    .0 05/25/2024    CREATSERUM 1.53 (H) 05/25/2024    BUN 26 (H) 05/25/2024     05/25/2024    K 5.1 05/25/2024     05/25/2024    CO2 17.0 (L) 05/25/2024    GLU 79 05/25/2024    CA 9.1 05/25/2024    ALB 3.6 05/25/2024    ALKPHO 209 (H) 05/25/2024    BILT 1.1 05/25/2024    TP 6.8 05/25/2024    AST 32 05/25/2024    ALT 24 05/25/2024    PTT 35.2 02/09/2024    INR 1.25 (H) 02/09/2024    T4F 1.2 02/03/2024    TSH 10.681 (H) 02/03/2024    LIP 32 01/13/2024    ESRML 61 (H) 04/23/2024    CRP 5.10 (H)  04/23/2024    MG 2.2 05/25/2024    PHOS 4.2 04/23/2024    TROPHS 24 05/24/2024    B12 >2,000 (H) 02/03/2024     XR CHEST AP PORTABLE  (CPT=71045)    Result Date: 5/24/2024  CONCLUSION: Moderate pulmonary edema, unchanged.  Trace bilateral pleural effusions are unchanged.  Consolidative airspace opacity within the right lower lobe is unchanged suggestive of atelectasis.    Dictated by (CST): Kane Garcia MD on 5/24/2024 at 7:05 PM     Finalized by (CST): Kane Garcia MD on 5/24/2024 at 7:09 PM         EKG    Result Date: 5/25/2024  Atrial-sensed ventricular-paced rhythm Abnormal ECG When compared with ECG of 15-MAY-2024 15:53, Vent. rate has increased BY   6 BPM     Impression:      1- acute on chronic HFrEF  LVEf 15 % with acute respiratory failure with hypoxia  CXR moderate edema with a trace basilar effusion and atelectasis  Very high BNP   No leukocytosis  Minimal history of smoking quit years ago with no clinical evidence of airway disease    Diuretics, Entresto, beta-blocker  O2 therapy and NIV if needed  Cardiology following    2-severe rheumatoid arthritis with joint deformities    3-DVT prophylaxis  Heparin subcu      D/w cardiology   D/w staff           Linsey Liao MD  5/25/2024

## 2024-05-25 NOTE — CONSULTS
[facility]  part of [facility]    Report of Consultation    Margaret Silverio Patient Status:  Inpatient    3/14/1945 MRN [id number]   Location [facility]5W Attending Raiza Alvarez MD   Hosp Day # 1 PCP Johnathon Rodriguez DO     Date of Admission:  2024  Date of Consult:  2024   Reason for Consultation:   GEORGE/CKD    History of Present Illness:   Patient is a 79 year old female who was admitted to the hospital for Acute respiratory failure with hypoxia (HCC):    79 year old female with past medical history of HTN, CKD 3b, HFrEF (EF 15-20%) + grade 4 DD, s/p AICD, A.fib, s/p bioprosthetic MVR, RA, and h/o GIB/AVMs, who was recently admitted from - for hypotension and george/ckd. S/p IVF and cr improved. Was discharged without diuretics.  She presented serge to ER with sob/ CHF exacerbation. Started on IV lasix. Cr at baseline on admission.   B/l cr 1.3-1.6 mg/dl    Sees Dr Arvizu as outpatient    Past Medical History  Past Medical History:    Acute kidney insufficiency    Anemia    Arrhythmia    CHF (congestive heart failure) (HCC)    CKD (chronic kidney disease) stage 3, GFR 30-59 ml/min (HCC)    Deep vein thrombosis (HCC)    Essential hypertension    History of blood transfusion    Rheumatoid arthritis (HCC)    Seizure disorder (HCC)       Past Surgical History  Past Surgical History:   Procedure Laterality Date    Colonoscopy N/A 2024    Dr. Rios    Colonoscopy N/A 2024    Procedure: COLONOSCOPY;  Surgeon: Zoraida Rios MD;  Location: [facility] ENDOSCOPY    Egd N/A 2024    Dr. Rios    Other surgical history  2017    Heart Surgery     Other surgical history  2020    Bilateral shoulder replacement        Family History  No family history on file.    Social History  Social History     Socioeconomic History    Marital status:    Tobacco Use    Smoking status: Former     Current packs/day: 0.00     Types: Cigarettes     Quit date:      Years since  quitting: 10.4    Smokeless tobacco: Never   Vaping Use    Vaping status: Never Used   Substance and Sexual Activity    Alcohol use: Never    Drug use: Never     Social Determinants of Health     Financial Resource Strain: Low Risk  (1/23/2024)    Financial Resource Strain     Med Affordability: No   Food Insecurity: No Food Insecurity (5/24/2024)    Food Insecurity     Food Insecurity: Never true   Transportation Needs: No Transportation Needs (5/24/2024)    Transportation Needs     Lack of Transportation: No   Housing Stability: Low Risk  (5/24/2024)    Housing Stability     Housing Instability: No       Current Medications:  Current Facility-Administered Medications   Medication Dose Route Frequency    amiodarone (Pacerone) tab 200 mg  200 mg Oral Daily    carvedilol (Coreg) tab 3.125 mg  3.125 mg Oral BID with meals    ferrous sulfate DR tab 325 mg  325 mg Oral Daily with breakfast    folic acid (Folvite) tab 800 mcg  800 mcg Oral Daily    mirtazapine (Remeron) tab 7.5 mg  7.5 mg Oral Nightly    pantoprazole (Protonix) DR tab 40 mg  40 mg Oral BID AC    sacubitril-valsartan (Entresto) 49-51 MG per tab 1 tablet  1 tablet Oral BID    heparin (Porcine) 5000 UNIT/ML injection 5,000 Units  5,000 Units Subcutaneous Q8H [place]    acetaminophen (Tylenol Extra Strength) tab 500 mg  500 mg Oral Q4H PRN    acetaminophen (Tylenol) tab 650 mg  650 mg Oral Q4H PRN    Or    HYDROcodone-acetaminophen (Norco) 5-325 MG per tab 1 tablet  1 tablet Oral Q4H PRN    Or    HYDROcodone-acetaminophen (Norco) 5-325 MG per tab 2 tablet  2 tablet Oral Q4H PRN    ondansetron (Zofran) 4 MG/2ML injection 4 mg  4 mg Intravenous Q6H PRN    furosemide (Lasix) 10 mg/mL injection 40 mg  40 mg Intravenous BID (Diuretic)     Medications Prior to Admission   Medication Sig    mirtazapine 7.5 MG Oral Tab Take 1 tablet (7.5 mg total) by mouth nightly.    PANTOPRAZOLE 40 MG Oral Tab EC TAKE 1 TABLET(40 MG) BY MOUTH TWICE DAILY BEFORE MEALS    CARVEDILOL  3.125 MG Oral Tab Take 1 tablet (3.125 mg total) by mouth 2 (two) times daily with meals.    SACUBITRIL-VALSARTAN 49-51 MG Oral Tab Take 1 tablet by mouth 2 (two) times daily.    folic acid 800 MCG Oral Tab Take 1 tablet (800 mcg total) by mouth daily.    amiodarone 200 MG Oral Tab Take 1 tablet (200 mg total) by mouth daily.    methotrexate 2.5 MG Oral Tab TAKE 6 TABLETS BY MOUTH 1 TIME A WEEK    alendronate 70 MG Oral Tab Take 1 tablet (70 mg total) by mouth once a week.    ferrous sulfate 325 (65 FE) MG Oral Tab EC Take 1 tablet (325 mg total) by mouth daily with breakfast.    HYDROcodone-acetaminophen (NORCO)  MG Oral Tab Take 1 tablet by mouth every 6 (six) hours as needed for Pain.    [] ciprofloxacin (CIPRO) 250 MG Oral Tab Take 1 tablet (250 mg total) by mouth 2 (two) times daily for 5 days.       Allergies  Allergies   Allergen Reactions    Lisinopril Coughing       Review of Systems:   GENERAL HEALTH: feels well otherwise  SKIN: denies any unusual skin lesions or rashes  RESPIRATORY: denies shortness of breath with exertion, no cough, no hemoptysis  CARDIOVASCULAR: denies chest pain on exertion, no palpitations  :  Denies hematuria, dysuria, foamy urine  GI: denies abdominal pain and denies heartburn, no nausea/vomiting/diarrhea  EXT: no edema  NEURO: denies headaches      A comprehensive 12 point review of systems was completed.  Pertinent positives as above and all the rest were negative.     Physical Exam:   /69 (BP Location: Right arm)   Pulse 91   Temp 98.2 °F (36.8 °C) (Oral)   Resp 20   Ht 5' 5\" (1.651 m)   Wt 123 lb 10.9 oz (56.1 kg)   SpO2 100%   BMI 20.58 kg/m²      Intake/Output Summary (Last 24 hours) at 2024 1152  Last data filed at 2024 0504  Gross per 24 hour   Intake 260 ml   Output 500 ml   Net -240 ml     Wt Readings from Last 1 Encounters:   24 123 lb 10.9 oz (56.1 kg)       Exam  GENERAL: well developed, well nourished,in no apparent  distress  SKIN: no rashes  HEENT: no scleral icterus, moist mucus membranes  NECK: supple,no adenopathy,no bruits  LUNGS: clear to auscultation without crackles or wheezes  CARDIO: RRR without murmur  GI: good BS's,no masses, HSM or tenderness, soft, non-distended, no audible bruits  EXTREMITIES: no cyanosis, clubbing or edema  NEURO: CN grossly intact, A+O x3  Access      Results:     Laboratory Data:  Recent Labs   Lab 05/24/24  1750 05/25/24  0348   RBC 2.69* 2.41*   HGB 8.3* 7.4*   HCT 27.7* 23.7*   .0* 98.3   MCH 30.9 30.7   MCHC 30.0* 31.2   RDW 18.7* 17.7*   NEPRELIM 3.67 3.33   WBC 5.3 5.0   .0 273.0         Recent Labs   Lab 05/24/24  1936 05/25/24  0348   GLU 91 79   BUN 30* 26*   CREATSERUM 1.47* 1.53*   CA 9.4 9.1   * 136   K 5.9* 5.1    107   CO2 20.0* 17.0*        Imaging:  XR CHEST AP PORTABLE  (CPT=71045)    Result Date: 5/24/2024  CONCLUSION: Moderate pulmonary edema, unchanged.  Trace bilateral pleural effusions are unchanged.  Consolidative airspace opacity within the right lower lobe is unchanged suggestive of atelectasis.    Dictated by (CST): Kane Garcia MD on 5/24/2024 at 7:05 PM     Finalized by (CST): Kane Garcia MD on 5/24/2024 at 7:09 PM               Impression/Plan:     Impression:    CKD 3 cr at b/l 1.4-1.6 mg/dl/ monitor cr with diuresis. [place] UA  HTN with CKD: acceptable. On coreg and entresto  Hyperkalemia s/p medical management . On entresto at home.  CHFrEF exacerbation, IV diuretics  Anemia, check iron panel      Plan:  Cr at baseline. Monitor cr while getting diuresed  Check iron panel    Thank you very much for allowing me to participate in the care of your patient.  If you have any questions, please do not hesitate to contact me.     Keerthi Naqvi MD  5/25/2024

## 2024-05-25 NOTE — PLAN OF CARE
Patient is A/0x4, diuretics, PT/OT consult, room air, call light in place     Problem: Patient Centered Care  Goal: Patient preferences are identified and integrated in the patient's plan of care  Description: Interventions:  - What would you like us to know as we care for you? From home with spouse   - Provide timely, complete, and accurate information to patient/family  - Incorporate patient and family knowledge, values, beliefs, and cultural backgrounds into the planning and delivery of care  - Encourage patient/family to participate in care and decision-making at the level they choose  - Honor patient and family perspectives and choices  Outcome: Progressing      Problem: RESPIRATORY - ADULT  Goal: Achieves optimal ventilation and oxygenation  Description: INTERVENTIONS:  - Assess for changes in respiratory status  - Assess for changes in mentation and behavior  - Position to facilitate oxygenation and minimize respiratory effort  - Oxygen supplementation based on oxygen saturation or ABGs  - Provide Smoking Cessation handout, if applicable  - Encourage broncho-pulmonary hygiene including cough, deep breathe, Incentive Spirometry  - Assess the need for suctioning and perform as needed  - Assess and instruct to report SOB or any respiratory difficulty  - Respiratory Therapy support as indicated  - Manage/alleviate anxiety  - Monitor for signs/symptoms of CO2 retention  Outcome: Progressing     Problem: CARDIOVASCULAR - ADULT  Goal: Maintains optimal cardiac output and hemodynamic stability  Description: INTERVENTIONS:  - Monitor vital signs, rhythm, and trends  - Monitor for bleeding, hypotension and signs of decreased cardiac output  - Evaluate effectiveness of vasoactive medications to optimize hemodynamic stability  - Monitor arterial and/or venous puncture sites for bleeding and/or hematoma  - Assess quality of pulses, skin color and temperature  - Assess for signs of decreased coronary artery perfusion - ex.  Angina  - Evaluate fluid balance, assess for edema, trend weights  Outcome: Progressing  Goal: Absence of cardiac arrhythmias or at baseline  Description: INTERVENTIONS:  - Continuous cardiac monitoring, monitor vital signs, obtain 12 lead EKG if indicated  - Evaluate effectiveness of antiarrhythmic and heart rate control medications as ordered  - Initiate emergency measures for life threatening arrhythmias  - Monitor electrolytes and administer replacement therapy as ordered  Outcome: Progressing

## 2024-05-25 NOTE — PLAN OF CARE
Patient AO x4 on room air. Transfer orders received for telemetry unit. Telephone report given to Emanuel GIL on PMU. Safety measures maintained.    Double RN skin check done prior to transfer off Unit. Skin check performed by this RN and JEFFERY Boyer.   Wounds are as follows: none   Will remain available for any further questions or concerns.       Problem: RESPIRATORY - ADULT  Goal: Achieves optimal ventilation and oxygenation  Description: INTERVENTIONS:  - Assess for changes in respiratory status  - Assess for changes in mentation and behavior  - Position to facilitate oxygenation and minimize respiratory effort  - Oxygen supplementation based on oxygen saturation or ABGs  - Provide Smoking Cessation handout, if applicable  - Encourage broncho-pulmonary hygiene including cough, deep breathe, Incentive Spirometry  - Assess the need for suctioning and perform as needed  - Assess and instruct to report SOB or any respiratory difficulty  - Respiratory Therapy support as indicated  - Manage/alleviate anxiety  - Monitor for signs/symptoms of CO2 retention  5/25/2024 1118 by Yolette Peña RN  Outcome: Progressing  5/25/2024 1117 by Yolette Peña RN  Outcome: Progressing     Problem: CARDIOVASCULAR - ADULT  Goal: Maintains optimal cardiac output and hemodynamic stability  Description: INTERVENTIONS:  - Monitor vital signs, rhythm, and trends  - Monitor for bleeding, hypotension and signs of decreased cardiac output  - Evaluate effectiveness of vasoactive medications to optimize hemodynamic stability  - Monitor arterial and/or venous puncture sites for bleeding and/or hematoma  - Assess quality of pulses, skin color and temperature  - Assess for signs of decreased coronary artery perfusion - ex. Angina  - Evaluate fluid balance, assess for edema, trend weights  Outcome: Progressing  Goal: Absence of cardiac arrhythmias or at baseline  Description: INTERVENTIONS:  - Continuous cardiac monitoring, monitor vital signs,  obtain 12 lead EKG if indicated  - Evaluate effectiveness of antiarrhythmic and heart rate control medications as ordered  - Initiate emergency measures for life threatening arrhythmias  - Monitor electrolytes and administer replacement therapy as ordered  Outcome: Progressing

## 2024-05-25 NOTE — PHYSICAL THERAPY NOTE
PHYSICAL THERAPY EVALUATION - INPATIENT     Room Number: 512/512-A  Evaluation Date: 5/25/2024  Type of Evaluation: Initial   Physician Order: PT Eval and Treat    Presenting Problem: acute respiratory failure with hypoxia     Reason for Therapy: Mobility Dysfunction and Discharge Planning    PHYSICAL THERAPY ASSESSMENT   Patient is a 79 year old female admitted 5/24/2024 for acute respiratory failure with hypoxia.  Prior to admission, patient's baseline is independent with ADLs and functional mobility with RW.  Patient is currently functioning near baseline with bed mobility, transfers, gait, maintaining seated position, standing prolonged periods, and performing household tasks.  Patient is requiring contact guard assist as a result of the following impairments: decreased functional strength, decreased endurance/aerobic capacity, pain, impaired dynamic standing balance, and medical status.  Physical Therapy will continue to follow for duration of hospitalization.    Patient will benefit from continued skilled PT Services at discharge to promote functional independence and safety with additional support and return home with home health PT.    PLAN  PT Treatment Plan: Body mechanics;Bed mobility;Endurance;Energy conservation;Patient education;Family education;Gait training;Strengthening;Balance training;Transfer training  Rehab Potential : Good  Frequency (Obs): 5x/week    PHYSICAL THERAPY MEDICAL/SOCIAL HISTORY     Problem List  Principal Problem:    Acute respiratory failure with hypoxia (HCC)    HOME SITUATION  Home Situation  Type of Home: House  Home Layout: One level  Stairs to Enter : 0  Lives With: Spouse  Drives: No  Patient Owned Equipment: Rolling walker  Patient Regularly Uses: None     Prior Level of [place]: independent with ADLs, ambulates with RW     SUBJECTIVE  Agreeable to activity.     PHYSICAL THERAPY EXAMINATION   OBJECTIVE  Precautions: Bed/chair alarm  Fall Risk: High fall risk    WEIGHT  BEARING RESTRICTION  Weight Bearing Restriction: None    PAIN ASSESSMENT  Rating: Unable to rate  Location: neck  Management Techniques: Body mechanics;Activity promotion;Repositioning    COGNITION  Overall Cognitive Status:  WFL - within functional limits    RANGE OF MOTION AND STRENGTH ASSESSMENT  Upper extremity ROM and strength are within functional limits.  Lower extremity ROM is within functional limits .  Lower extremity strength is within functional limits.    BALANCE  Static Sitting: Good  Dynamic Sitting: Fair +  Static Standing: Fair -  Dynamic Standing: Fair -    ACTIVITY TOLERANCE  Pulse: 100  Heart Rate Source: Monitor    O2 WALK  Oxygen Therapy  SPO2% Ambulation on Room Air: 99    AM-PAC '6-Clicks' INPATIENT SHORT FORM - BASIC MOBILITY  How much difficulty does the patient currently have...  Patient Difficulty: Turning over in bed (including adjusting bedclothes, sheets and blankets)?: A Little   Patient Difficulty: Sitting down on and standing up from a chair with arms (e.g., wheelchair, bedside commode, etc.): A Little   Patient Difficulty: Moving from lying on back to sitting on the side of the bed?: A Little   How much help from another person does the patient currently need...   Help from Another: Moving to and from a bed to a chair (including a wheelchair)?: A Little   Help from Another: Need to walk in hospital room?: A Little   Help from Another: Climbing 3-5 steps with a railing?: A Little     AM-PAC Score:  Raw Score: 18   Approx Degree of Impairment: 46.58%   Standardized Score (AM-PAC Scale): 43.63   CMS Modifier (G-Code): CK    FUNCTIONAL ABILITY STATUS  Functional Mobility/Gait Assessment  Gait Assistance: Contact guard assist  Distance (ft): 15  Assistive Device: Rolling walker  Pattern: Shuffle (slow pace, flexed posture, no LOB. RA of bilateral hands with difficulty gripping RW.)  Supine to Sit: contact guard assist  Sit to Stand: contact guard assist    Exercise/Education  Provided:  Bed mobility  Body mechanics  Energy conservation  Functional activity tolerated  Gait training  Posture  Transfer training    RN approved PT eval. Pt received resting in bed with  at bedside, agreeable to activity. C/o headache which has improved since being medicated. Demos bed mobility with CGA; STS transfer to/from EOB and chair with RW and CGA, initially unsteady with posterior lean which she was able to correct; ambulated in the room with RW and CGA, slow but overall steady gait with no LOB. Pt c/o mild SOB with activity. VSS. Pt was left sitting in chair with needs within reach, chair alarm on, handoff to RN complete.     The patient's Approx Degree of Impairment: 46.58% has been calculated based on documentation in the [facility] '6 clicks' Inpatient Basic Mobility Short Form.  Research supports that patients with this level of impairment may benefit from home with  PT.  Final disposition will be made by interdisciplinary medical team.    Patient End of Session: Up in chair;Needs met;Call light within reach;RN aware of session/findings;All patient questions and concerns addressed;Alarm set;Family present    CURRENT GOALS  Goals to be met by: 6/1/24  Patient Goal Patient's self-stated goal is: go home   Goal #1 Patient is able to demonstrate supine - sit EOB @ level: supervision     Goal #1   Current Status    Goal #2 Patient is able to demonstrate transfers Sit to/from Stand at assistance level: supervision with walker - rolling     Goal #2  Current Status    Goal #3 Patient is able to ambulate 150 feet with assist device: walker - rolling at assistance level: supervision   Goal #3   Current Status    Goal #4    Goal #4   Current Status    Goal #5 Patient to demonstrate independence with home activity/exercise instructions provided to patient in preparation for discharge.   Goal #5   Current Status    Goal #6    Goal #6  Current Status      Patient Evaluation Complexity Level:  History  Moderate - 1 or 2 personal factors and/or co-morbidities   Examination of body systems Moderate - addressing a total of 3 or more elements   Clinical Presentation  Moderate - Evolving   Clinical Decision Making  Moderate Complexity     Therapeutic Activity:  15 minutes

## 2024-05-25 NOTE — PROGRESS NOTES
[facility]  part of [facility]    Progress Note    Margaret Silverio Patient Status:  Inpatient    3/14/1945 MRN [id number]   Location [facility]5W Attending Raiza Alvarez MD   Hosp Day # 1 PCP Johnathon Rodriguez,        Subjective:   Margaret Silverio is feeling better. Afebrile. No sob or chest pain.     Objective:   Blood pressure 119/69, pulse 91, temperature 98.2 °F (36.8 °C), temperature source Oral, resp. rate 20, height 5' 5\" (1.651 m), weight 123 lb 10.9 oz (56.1 kg), SpO2 100%.    Physical Exam:    General: No acute distress.   Respiratory: Clear to auscultation bilaterally. No wheezes. No rhonchi.  Cardiovascular: S1, S2. Regular rate and rhythm. No murmurs, rubs or gallops.   Abdomen: Soft, nontender, nondistended.  Positive bowel sounds. No rebound or guarding.  Neurologic: No focal neurological deficits.   Musculoskeletal: Moves all extremities.  Extremities: No edema.    Results:     Lab Results   Component Value Date    WBC 5.0 2024    HGB 7.4 (L) 2024    HCT 23.7 (L) 2024    .0 2024    CREATSERUM 1.53 (H) 2024    BUN 26 (H) 2024     2024    K 5.1 2024     2024    CO2 17.0 (L) 2024    GLU 79 2024    CA 9.1 2024    ALB 3.6 2024    ALKPHO 209 (H) 2024    BILT 1.1 2024    TP 6.8 2024    AST 32 2024    ALT 24 2024    PTT 35.2 2024    INR 1.25 (H) 2024    T4F 1.2 2024    TSH 10.681 (H) 2024    LIP 32 2024    ESRML 61 (H) 2024    CRP 5.10 (H) 2024    MG 2.2 2024    PHOS 4.2 2024    B12 >2,000 (H) 2024       XR CHEST AP PORTABLE  (CPT=71045)    Result Date: 2024  CONCLUSION: Moderate pulmonary edema, unchanged.  Trace bilateral pleural effusions are unchanged.  Consolidative airspace opacity within the right lower lobe is unchanged suggestive of atelectasis.    Dictated by (CST):  Kane Garcia MD on 5/24/2024 at 7:05 PM     Finalized by (CST): Kane Garcia MD on 5/24/2024 at 7:09 PM         EKG    Result Date: 5/25/2024  Atrial-sensed ventricular-paced rhythm Abnormal ECG When compared with ECG of 15-MAY-2024 15:53, Vent. rate has increased BY   6 BPM Confirmed by RAAD LY PRATIK ([id number]) on 5/25/2024 9:32:23 AM      lidocaine-menthol  1 patch Transdermal Daily    sodium zirconium cyclosilicate  10 g Oral Once    amiodarone  200 mg Oral Daily    carvedilol  3.125 mg Oral BID with meals    ferrous sulfate  325 mg Oral Daily with breakfast    folic acid  800 mcg Oral Daily    mirtazapine  7.5 mg Oral Nightly    pantoprazole  40 mg Oral BID AC    sacubitril-valsartan  1 tablet Oral BID    heparin  5,000 Units Subcutaneous Q8H BROOKLYNN    furosemide  40 mg Intravenous BID (Diuretic)       acetaminophen    acetaminophen **OR** HYDROcodone-acetaminophen **OR** HYDROcodone-acetaminophen    ondansetron    Assessment and Plan:        Acute respiratory failure with hypoxia (HCC)  -Patient presenting with shortness of breath  -In ED noted to have increased work of breathing and hypoxia  -Patient was started on noninvasive ventilation with improvement of symptoms.  -Chest x-ray reviewed.  Noted moderate pulmonary edema, trace bilateral pleural effusions both of which appear unchanged from recent imaging.  Patient also with right lower lobe airspace opacity which again appeared unchanged from previous, likely atelectasis.  -BNP noted to be grossly elevated, greater than 5000  -Continue on IV lasix   -Pulmonology and cardiology consulted, appreciate recommendations     Acute on chronic heart failure with severely reduced ejection fraction   - Last known EF 15%  -Starting diuresis with Lasix 40mg IV BID  - Strict I&Os, Daily weights, Fluid restriction  - Cardiology on consult, appreciate recs.      Hyperkalemia  -in setting of CKD  -Telemetry monitoring  -S/p Lasix  -Recheck  -Nephrology on consult      CKD   III   -Kidney function near baseline  - Starting IVF  - Avoiding Nephrotoxic agents  - Cont to monitor     Chronic anemia  -Without signs of acute blood loss  -Hemoglobin improved from previous  -Continue to monitor            Quality:  DVT Prophylaxis: Heparin  CODE status: Full    MDM .high      FREYA COMBS MD  05/25/24

## 2024-05-25 NOTE — HISTORICAL OFFICE NOTE
[facility]  Outside Information  Continuity of Care Document  5/20/2024  Margaret Silverio - 79 y.o. Female; born [birth year]Summary of episode note, generated on May 24, 2024May 24, 2024   CHIEF COMPLAINT    CHIEF COMPLAINT  Reason for Visit/Chief Complaint   Hospital f/u  Low BP, dehydrated   This is a patient of Dr. Boles with a history of nonischemic cardiomyopathy, heart failure reduced ejection fraction, severe tricuspid valve regurgitation, bioprosthetic mitral valve replacement, possible TIA, atrial flutter, dual chamber ICD, hypertension, hyperlipidemia, severe GI bleed and left atrial appendage occlusion. Patient has had an ejection fraction of 35 to 40% but recently was found to be decreased to 15 to 20%. She was started on Bumex 1 mg for her wide-open tricuspid valve regurgitation and RV failure. She continues to have shortness of breath and palpitations and other causes were considered including pulmonary embolism. VQ scan was low probability. She comes today after presenting to the hospital with weakness and systolic blood pressure in the 80s at home. She was found to have recurrent anemia with hemoglobin 8.4 as well as acute on chronic kidney disease with creatinine 2.28. Medications were held and she was gently hydrated. Nephrology was consulted as well as cardiology, her creatinine improved to 1.48 on discharge. Her hypotension was felt to be a combination of polypharmacy, poor appetite and dehydration. Her hemoglobin/ hematocrit decrease was felt to be delusional and she is to establish care with a hematologist to receive Epogen. She was advised to resume carvedilol but at a decreased dose of 3.125 mg twice a day and was also to restart Entresto 49/51 mg twice a day.She continues to feel weak with low blood pressures. She continues to have very poor appetite with an adequate hydration. She states that she takes 1 to 2 cans of Ensure daily and less than 16 ounces of  free water. She has not taken loop diuretics or spironolactone since her hospital discharge but resumed her home dose of carvedilol which is 25 mg twice a day. She is having some lightheadedness with standing. She denies any significant edema or orthopnea. Home weights are stable but she does experience shortness of breath with exertion. Prior to this hospitalization she was receiving home physical therapy for strengthening. She reports being very sedentary. She was not able to obtain a follow-up appointment with her primary care physician for about a month. She has not establish care with a hematologist.     PROBLEMS  Reconcile with Patient's ChartPROBLEMS  Problem Effective Dates Date resolved Problem Status   Nonischemic cardiomyopathy, [SNOMED-CT: 91548857] 5/21/2024 - Active   Anemia, unspecified, [SNOMED-CT: 930714098] 5/21/2024 - Active   Anorexia, [SNOMED-CT: 57147887] 5/21/2024 - Active   Chronic kidney insufficiency, stage 3 (moderate), [SNOMED-CT: 681770814] 5/21/2024 - Active   H/O hypotension, [SNOMED-CT: 956428602] 5/21/2024 - Active   Severe tricuspid regurgitation, [SNOMED-CT: 067281629] 5/21/2024 - Active   SOB (shortness of breath), [SNOMED-CT: 130954058] 4/11/2024 - Active   On amiodarone therapy, [SNOMED-CT: 994384391] 1/29/2024 - Active   Automatic implantable cardiac defibrillator (S/P AICD), [SNOMED-CT: 331311823] 1/29/2024 - Active   Atrial fibrillation (Afib), paroxysmal - A Fib, [SNOMED-CT: 649063747] 1/29/2024 - Active   Claudication, [SNOMED-CT: 64713295] 7/27/2022 - Active   ACC/AHA stage B systolic heart failure, [SNOMED-CT: 965887077331317] 7/27/2022 - Active   History of mitral valve replacement (MVR) - Hx, [SNOMED-CT: 1085132614176] 6/22/2022 - Active   Benign essential HTN, [SNOMED-CT: 1063237] 6/22/2022 - Active   Acute kidney injury (HCC), [SNOMED-CT: 36264670] 5/21/2024 - Active     ENCOUNTER DIAGNOSIS    ENCOUNTER DIAGNOSIS  Problem Effective Dates Date resolved Problem Status    Nonischemic cardiomyopathy, [SNOMED-CT: 10815380] 5/21/2024 - Active   Anemia, unspecified, [SNOMED-CT: 900333641] 5/21/2024 - Active   Anorexia, [SNOMED-CT: 17865952] 5/21/2024 - Active   Chronic kidney insufficiency, stage 3 (moderate), [SNOMED-CT: 462327590] 5/21/2024 - Active   H/O hypotension, [SNOMED-CT: 509822501] 5/21/2024 - Active   Severe tricuspid regurgitation, [SNOMED-CT: 483286363] 5/21/2024 - Active   On amiodarone therapy, [SNOMED-CT: 700076883] 1/29/2024 - Active   Atrial fibrillation (Afib), paroxysmal - A Fib, [SNOMED-CT: 940381645] 1/29/2024 - Active   ACC/AHA stage B systolic heart failure, [SNOMED-CT: 476690665841860] 7/27/2022 - Active   History of mitral valve replacement (MVR) - Hx, [SNOMED-CT: 0320964863303] 6/22/2022 - Active     VITAL SIGNS    VITAL SIGNS  Date / Time: 5/21/2024   BP Systolic 86 mmHg   BP Diastolic 52 mmHg   Height 64 inches   Weight 126 lbs   Pulse Rate 60 bpm   BSA (Body Surface Area) 1.6 cc/m2   BMI (Body Mass Index) 21.6 cc/m2   Blood Pressure 86 / 52 mmHg     PHYSICAL EXAMINATION    PHYSICAL EXAMINATION  Header Details   Constitutional 95%O2   Vitals Left Arm Sitting BP 86 / 52 mmHg, Pulse rate 60 bpm, Regular, Height in 5' 4\", BMI: 21.6, Weight in 125.66 lbs (or) 57 kgs, BSA : 1.61 cc/m²   General Appearance No Acute Distress   Respiratory Lungs clear with normal breath sounds   Cardiovascular Regular rhythm. Normal rate present. Normal and normal S1 and S2   Lower Extremities No edema     ALLERGIES, ADVERSE REACTIONS, ALERTS    ALLERGIES, ADVERSE REACTIONS, ALERTS  Type Substance Reaction Severity Status   Allergies lisinopril - Ingredient, [RxNorm: 25182] cough Mild Active     MEDICATIONS ADMINISTERED DURING VISIT    No data available    MEDICATIONS  Reconcile with Patient's ChartMEDICATIONS  Medication Start Date Route/Frequency Status   alendronate 70 MG Oral Tab, [RxNorm: 340274] 5/21/2024 Take 1 tablet (70 mg total) by mouth once a week. Active   amiodarone  200 MG Oral Tab, [RxNorm: 407754] 5/21/2024 Take 1 tablet (200 mg total) by mouth daily. Active   bumetanide 1 mg tablet, [RxNorm: 842083] 4/4/2024 Take 1 tablet orally once a day. Active   carvediloL 25 mg tablet, [RxNorm: 473145] 5/21/2024 Take 1 tablet orally 2 times a day. Active   Entresto 49 mg-51 mg tablet, [RxNorm: 9434696] 5/21/2024 Take 1 tablet orally 2 times a day. Active   ferrous sulfate 325 (65 FE) MG Oral Tab EC, [RxNorm: 899979] 5/21/2024 Take 1 tablet (325 mg total) by mouth daily with breakfast. Active   folic acid 800 MCG Oral Tab, [RxNorm: 858397] 5/21/2024 Take 1 tablet (800 mcg total) by mouth daily. Active   furosemide 20 mg tablet, [RxNorm: 565013] 5/21/2024 Take one-half tablet orally once a day. Active   HYDROcodone-acetaminophen (NORCO)  MG Oral Tab, [RxNorm: 931609] 5/21/2024 Take 1 tablet by mouth every 6 (six) hours as needed for Pain. Active   methotrexate 2.5 MG Oral Tab, [RxNorm: 198500] 5/21/2024 TAKE 6 TABLETS BY MOUTH 1 TIME A WEEK Active   mirtazapine 7.5 MG Oral Tab, [RxNorm: 030064] 5/21/2024 Take 1 tablet (7.5 mg total) by mouth nightly. Active   PANTOPRAZOLE 40 MG Oral Tab EC, [RxNorm: 615937] 5/21/2024 TAKE 1 TABLET(40 MG) BY MOUTH TWICE DAILY BEFORE MEALS Active   spironolactone 25 mg tablet, [RxNorm: 385103] 4/4/2024 Take 1 tablet orally once a day. Active   carvediloL 3.125 mg tablet, [RxNorm: 076544] 5/21/2024 Take 1 tablet orally 2 times a day. Active     ASSESSMENT    1. Heart failure-continue Entresto 49/51 mg BID. She was to take coreg 3.125 mg BID after discharge but has been taking her previous dose of 25 mg BID. She has not taken spironolactone or loop diuretic since discharge. Weight is stable and she appears compensated on exam. We will send for the lower dose of coreg and continue Entresto. Return in 1 week for reassessment.  2. Hypotension-symptomatic. Poor oral intake and too high of a dose of coreg. Encouraged to drink 8 ounces water twice a day with  meds. Takes 1 - 2 cans of Ensure daily as well.  3. Anemia-to see hematolologist for epogen. Needs referral from PCP and she will call.  4. Cardiomyopathy-continue Entresto and lower dose of carvedilol. At her follow-up visit if blood pressures improved would consider restarting spironolactone.  5. Atrial fibrillation-no longer on anticoagulation and per her EP consult left atrial appendage is probably occluded. Heart rate is controlled.Plan:  Dc coreg 25 mg  new Rx for carvedilol 3.125 mg BID- #60 with 6 refills  Monitor your BP at home  Call if you develop edema  no spironolactone or bumatanide for now  Drink 8 ounces water twice a day  Follow up APN in 1 week  BMP and CBC before next visit  Office visit Dr. Boles in 2-3 weeks  Call Dr. Rodriguez about a hematologist  Chair exercises     FAMILY HISTORY    FAMILY HISTORY  Relationship Age Diagnosis   Father 0 No history of Family history of heart disease   Mother 0 No history of Family history of heart disease     GENERAL STATUS    No data available    PAST MEDICAL HISTORY    PAST MEDICAL HISTORY  Problem Date diagonsed Date resolved Status   Nonischemic cardiomyopathy, [SNOMED-CT: 78543113] 5/21/2024 - Active   Anemia, unspecified, [SNOMED-CT: 596145837] 5/21/2024 - Active   Anorexia, [SNOMED-CT: 94745476] 5/21/2024 - Active   Chronic kidney insufficiency, stage 3 (moderate), [SNOMED-CT: 150159582] 5/21/2024 - Active   H/O hypotension, [SNOMED-CT: 360289517] 5/21/2024 - Active   Severe tricuspid regurgitation, [SNOMED-CT: 459626288] 5/21/2024 - Active   SOB (shortness of breath), [SNOMED-CT: 916346651] 4/11/2024 - Active   On amiodarone therapy, [SNOMED-CT: 687274757] 1/29/2024 - Active   Atrial fibrillation (Afib), paroxysmal - A Fib, [SNOMED-CT: 148559136] 1/29/2024 - Active   Claudication, [SNOMED-CT: 61970761] 7/27/2022 - Active   ACC/AHA stage B systolic heart failure, [SNOMED-CT: 093110294989611] 7/27/2022 - Active   History of mitral valve replacement (MVR) -  Hx, [SNOMED-CT: 2325934559032] 6/22/2022 - Active   Benign essential HTN, [SNOMED-CT: 4278871] 6/22/2022 - Active   Acute kidney injury (HCC), [SNOMED-CT: 44305556] 5/21/2024 - Active     HISTORY OF PRESENT ILLNESS    This is a patient of Dr. Boles with a history of nonischemic cardiomyopathy, heart failure reduced ejection fraction, severe tricuspid valve regurgitation, bioprosthetic mitral valve replacement, possible TIA, atrial flutter, dual chamber ICD, hypertension, hyperlipidemia, severe GI bleed and left atrial appendage occlusion. Patient has had an ejection fraction of 35 to 40% but recently was found to be decreased to 15 to 20%. She was started on Bumex 1 mg for her wide-open tricuspid valve regurgitation and RV failure. She continues to have shortness of breath and palpitations and other causes were considered including pulmonary embolism. VQ scan was low probability. She comes today after presenting to the hospital with weakness and systolic blood pressure in the 80s at home. She was found to have recurrent anemia with hemoglobin 8.4 as well as acute on chronic kidney disease with creatinine 2.28. Medications were held and she was gently hydrated. Nephrology was consulted as well as cardiology, her creatinine improved to 1.48 on discharge. Her hypotension was felt to be a combination of polypharmacy, poor appetite and dehydration. Her hemoglobin/ hematocrit decrease was felt to be delusional and she is to establish care with a hematologist to receive Epogen. She was advised to resume carvedilol but at a decreased dose of 3.125 mg twice a day and was also to restart Entresto 49/51 mg twice a day.She continues to feel weak with low blood pressures. She continues to have very poor appetite with an adequate hydration. She states that she takes 1 to 2 cans of Ensure daily and less than 16 ounces of free water. She has not taken loop diuretics or spironolactone since her hospital discharge but resumed her home  dose of carvedilol which is 25 mg twice a day. She is having some lightheadedness with standing. She denies any significant edema or orthopnea. Home weights are stable but she does experience shortness of breath with exertion. Prior to this hospitalization she was receiving home physical therapy for strengthening. She reports being very sedentary. She was not able to obtain a follow-up appointment with her primary care physician for about a month. She has not establish care with a hematologist.     IMMUNIZATIONS  Reconcile with Patient's ChartNo data available    PLAN OF CARE    PLAN OF CARE  Planned Care Date   BMP (Basic Metabolic Panel) 1/1/1900   CBC (Complete Blood Count) 1/1/1900   Take 1 tablet orally 2 times a day.-carvediloL 3.125 mg tablet 5/21/2024     PROCEDURES    No data available    RESULTS    RESULTS  Name Result Date Location - Ordered By   POCT CREATININE [LOINC: 38635-7] 1.90 mg/dL [High] 04/11/2024 10:39:00 AM [facility] ([facility])  Address: [address] [facility]  [address]  tel:   E GFR CR [LOINC: 51767-9] 27 mL/min/1.73m2 [Low] 04/11/2024 10:39:00 AM [facility] ([facility])  Address: [address]  [facility]  [address]  tel:   GLUCOSE [LOINC: 2339-0] 104 mg/dL [High] 09/25/2023 12:35:00 PM [facility] ([facility])  Address: [address]  [facility]  [address]  tel:   SODIUM [LOINC: 2951-2] 139 mmol/L 09/25/2023 12:35:00 PM [facility] ([facility])  Address: [address]  [facility]  [address]  tel:   POTASSIUM [LOINC: 2823-3] 4.0 mmol/L 09/25/2023 12:35:00 PM [facility] ([facility])  Address: [address]  [facility]  [address]  tel:   CHLORIDE [LOINC: 2075-0] 106 mmol/L 09/25/2023 12:35:00 PM [facility] ([facility])  Address: [address] [facility]  [address]  tel:   CO2 [LOINC: 2028-9] 20.0 mmol/L [Low] 09/25/2023  12:35:00 PM [facility] ([facility])  Address: [address]  [facility]  [address]  tel:   ANION GAP [LOINC: 33037-3] 13 mmol/L 09/25/2023 12:35:00 PM [facility] ([facility])  Address: [address]  [facility]  [address]  tel:   BUN [LOINC: 6299-2] 18 mg/dL 09/25/2023 12:35:00 PM [facility] ([facility])  Address: [address]  [facility]  [address]  tel:   CREATININE [LOINC: 66580-5] 1.47 mg/dL [High] 09/25/2023 12:35:00 PM [facility] ([facility])  Address: [address]  [facility]  [address]  tel:   BUN/ CREAT RATIO [LOINC: 3097-3] 12.2 09/25/2023 12:35:00 PM [facility] ([facility])  Address: [address]  [facility]  [address]  tel:   CALCIUM [LOINC: 50502-2] 9.6 mg/dL 09/25/2023 12:35:00 PM [facility] ([facility])  Address: [address]  [facility]  [address]  tel:   OSMOLALITY CALCULATED [LOINC: 52049-3] 290 mOsm/kg 09/25/2023 12:35:00 PM [facility] ([facility])  Address: [address]  [facility]  [address]  tel:   E GFR CR [LOINC: 77974-5] 37 mL/min/1.73m2 [Low] 09/25/2023 12:35:00 PM [facility] ([facility])  Address: [address]  [facility]  [address]  tel:   FASTING PATIENT BMP ANSWER [LOINC: 95796-0] Yes 09/25/2023 12:35:00 PM [facility] ([facility])  Address: [address]  [facility]  [address]  tel:   MRSA SCREEN BY PCR [LOINC: 63247-8] Negative 09/25/2023 12:35:00 PM [facility] ([facility])  Address: [address]  [facility]  [address]  tel:   MORPHOLOGY [LOINC: 1556021] See morphology below [Abnormal] 09/25/2023 12:35:00 PM [facility] ([facility])  Address: [address]  [facility]  [address]  tel:   PLATELET MORPHOLOGY [LOINC: 9685471] Normal 09/25/2023 12:35:00 PM [facility]  ([facility])  Address: [address]  [facility]  [address]  tel:   MACROCYTOSIS [LOINC: 738-5] 1+ 09/25/2023 12:35:00 PM [facility] ([facility])  Address: [address]  [facility]  [address]  tel:   POLYCHROMASIA [LOINC: 08774-3] 1+ 09/25/2023 12:35:00 PM [facility] ([facility])  Address: [address]  [facility]  [address]  tel:   OVALOCYTES [LOINC: 774-0] 1+ 09/25/2023 12:35:00 PM [facility] ([facility])  Address: [address]  [facility]  [address]  tel:   WBC [LOINC: 6690-2] 3.8 x10(3) uL [Low] 09/25/2023 12:35:00 PM [facility] ([facility])  Address: [address]  [facility]  [address]  tel:   RED BLOOD COUNT [LOINC: 789-8] 3.29 x10(6)uL [Low] 09/25/2023 12:35:00 PM [facility] ([facility])  Address: [address]  [facility]  [address]  tel:   HGB [LOINC: 718-7] 10.1 g/dL [Low] 09/25/2023 12:35:00 PM [facility] ([facility])  Address: [address]  [facility]  [address]  tel:   HCT [LOINC: 4544-3] 33.0 % [Low] 09/25/2023 12:35:00 PM [facility] ([facility])  Address: [address]  [facility]  [address]  tel:   MEAN CELL VOLUME [LOINC: 787-2] 100.3 fL [High] 09/25/2023 12:35:00 PM [facility] ([facility])  Address: [address]  [facility]  [address]  tel:   MEAN CORPUSCULAR HEMOGLOBIN [LOINC: 785-6] 30.7 pg 09/25/2023 12:35:00 PM [facility] ([facility])  Address: [address]  [facility]  [address]  tel:   MEAN CORPUSCULAR HGB CONC [LOINC: 786-4] 30.6 g/dL [Low] 09/25/2023 12:35:00 PM [facility] ([facility])  Address: [address]  [facility]  [address]  tel:   RED CELL DISTRIBUTION WIDTH-SD [LOINC: 24428-5] 67.4 fL [High] 09/25/2023 12:35:00 PM [facility] ([facility])  Address: [address] [facility]  [address]  tel:   RED CELL DISTRIBUTION WIDTH CV [LOINC: 788-0] 19.2 % [High] 09/25/2023 12:35:00 PM [facility] ([facility])  Address: [address]  [facility]  [address]  tel:   PLATELETS [LOINC: 777-3] 190.0 10(3)uL 09/25/2023 12:35:00 PM [facility] ([facility])  Address: [address] [facility]  [address]  tel:   NEUTROPHIL ABS PRELIM [LOINC: 751-8] 2.31 x10 (3) uL 09/25/2023 12:35:00 PM [facility] ([facility])  Address: [address]  [facility]  [address]  tel:   NEUTROPHIL ABSOLUTE [LOINC: 751-8] 2.31 x10(3) uL 09/25/2023 12:35:00 PM [facility] ([facility])  Address: [address] [facility]  [address]  tel:   LYMPHOCYTE ABSOLUTE [LOINC: 731-0] 0.94 x10(3) uL [Low] 09/25/2023 12:35:00 PM [facility] ([facility])  Address: [address]  [facility]  [address]  tel:   MONOCYTE ABSOLUTE [LOINC: 742-7] 0.31 x10(3) uL 09/25/2023 12:35:00 PM [facility] ([facility])  Address: [address]  [facility]  [address]  tel:   EOSINOPHIL ABSOLUTE [LOINC: 711-2] 0.14 x10(3) uL 09/25/2023 12:35:00 PM [facility] ([facility])  Address: [address]  [facility]  [address]  tel:   BASOPHIL ABSOLUTE [LOINC: 704-7] 0.05 x10(3) uL 09/25/2023 12:35:00 PM [facility] ([facility])  Address: [address]  [facility]  [address]  tel:   IMMATURE GRANULOCYTE COUNT [LOINC: 32892-0] 0.02 x10(3) uL 09/25/2023 12:35:00 PM [facility] ([facility])  Address: [address]  [facility]  [address]  tel:   NEUTROPHIL % [LOINC: 770-8] 61.4 % 09/25/2023 12:35:00 PM [facility] ([facility])  Address: [address]  [facility]  [address]  tel:   LYMPHOCYTE % [LOINC: 736-9] 24.9 % 09/25/2023 12:35:00 PM [facility] ([facility])  Address:  [address]  [facility]  [address]  tel:   MONOCYTE % [LOINC: 5905-5] 8.2 % 09/25/2023 12:35:00 PM [facility] ([facility])  Address: [address]  [facility]  [address]  tel:   EOSINOPHIL % [LOINC: 713-8] 3.7 % 09/25/2023 12:35:00 PM [facility] ([facility])  Address: [address]  [facility]  [address]  tel:   BASOPHIL % [LOINC: 706-2] 1.3 % 09/25/2023 12:35:00 PM [facility] ([facility])  Address: [address]  [facility]  [address]  tel:   IMMATURE GRANULOCYTE RATIO % [LOINC: 07591-2] 0.5 % 09/25/2023 12:35:00 PM [facility] ([facility])  Address: [address]  [facility]  [address]  tel:   PROTIME [LOINC: 5902-2] 18.9 seconds [High] 09/25/2023 12:35:00 PM [facility] ([facility])  Address: [address]  [facility]  [address]  tel:   INR [LOINC: 50696-8] 1.61 [High] 09/25/2023 12:35:00 PM [facility] ([facility])  Address: [address]  [facility]  [address]  tel:   BMP (Basic Metabolic Panel) [LOINC: 28037-6] Pending Schedule next available     CBC (Complete Blood Count) [LOINC: 13926-0] Pending Schedule next available     Lower Extremity Arterial Ultrasound 1.The study quality is good. 2.Bilateral scattered plaque in the lower extremity arterial system causing less than 50% stenosis.3.Bilateral lower extremity arterial system demonstrates tri-phasic & bi-phasic waveforms.4.Left anterior tibial artery demonstrates low velocity flow.5.Three vessel runoff to the feet bilaterally. 9/22/2022 12:30:00 PM Blake Puckett MD   Trans Thoracic Echocardiogram 1.The left ventricle is normal in size. The left ventricular wall thickness is normal. Global left ventricular systolic function is moderately decreased. The left ventricular ejection fraction is 37%. Regional wall motion analysis shows dyskinesis of mid anteroseptal segment and mid inferoseptal  segment. Wall motion score index is 4. Left ventricular diastolic function is indeterminate.2.Right ventricular systolic function is severely decreased. 3.The left atrial diameter is mildly increased. 4.The right atrium is mildly enlarged. 5.Severe (4+) tricuspid regurgitation. 6.A bio prosthetic valve is noted at the mitral location. The mean trans mitral gradient is 5.0 mmHg.7.The pulmonary artery systolic pressure is 40 mmHg. 9/1/2023 11:00:00 AM Luis Sargent MD     REVIEW OF SYSTEMS    REVIEW OF SYSTEMS  Header Details   Cardiovascular RAM  No history of Chest pain, Palpitations, Syncope, PND, Orthopnea, Edema, Claudication   Respiratory No history of SOB, Wheezing, Sputum   Hem/Lymphatic No history of Easy bruising, Blood clots, Hx of blood transfusion, Anemia, Bleeding problems     SOCIAL HISTORY    SOCIAL HISTORY  Social History Element Description Effective Dates   Smoking status Never smoked -     FUNCTIONAL STATUS    No data available    MEDICAL EQUIPMENT    No data available    Goals Sections    No data available    REASON FOR REFERRAL         Health Concerns Section    No data available    COGNITIVE/MENTAL STATUS    No data available    Patient Demographics    Patient Demographics  Patient Address Patient Name Communication   [address] [place] [address] Margaret Silverio [phone] (Mobile)     Patient Demographics  Language Race / Ethnicity Marital Status   English - Spoken (Preferred) Black or  / Not  or       Document Information    Primary Care Provider Other Service Providers Document Coverage Dates   Gail Wasserman  NPI: [id number]  [phone] (Work)  [address], [address]  [place] [address]  [place] [address]  Interpreting Physicians  [facility]  [phone] (Work)  [address] [place] [address] Keshia White  NPI: [id number]  [phone] (Work)  [address], [address]  [place] [address]  [place] [address]  Nurses May 21, 2024May 21, 2024      Organization   [facility]  [phone] (Work)  [address], [address] [place] [address]  [place] [address]     Encounter Providers Encounter Date    May 21, 2024May 21, 2024     Legal Authenticator    Gail Wasserman  NPI: [id number]  [phone] (Work)  [address], [address] [place] [address]  [place] [address]

## 2024-05-25 NOTE — PROGRESS NOTES
Margaret Silverio Patient Status:  Inpatient    3/14/1945 MRN [id number]   Location [facility] 2W/SW Attending Kalia Rogers MD   Hosp Day # 0 PCP Johnathon Rodriguez,      Cardiology Nocturnal APN Note    Briefly: (Documentation from chart review)     Margaret Silverio is a 78 y/o female with PMH/PSH of CKD, CHF, HTN, RA, seizure disorder, AF s/p BRANDYN. She was recently admitted for hypotension, dehydration and GEORGE on CKD at which time she was gently hydrated and diuretics placed on hold. She has been having close follow up in our office and was seen on 24 at which time she was euvolemic on exam. She presented to the ED this evening with increased SOB and does appear to be in acute on chronic CHF.    Primary Cardiologist Boles    Vital Signs:       2024     6:45 PM 2024     8:01 PM   Vitals History   /75    Pulse 79 82   Resp 29    SpO2 100 % 100 %        Labs:   Lab Results   Component Value Date    WBC 5.3 2024    HGB 8.3 2024    HCT 27.7 2024    .0 2024    CREATSERUM 1.47 2024    BUN 30 2024     2024    K 5.9 2024     2024    CO2 20.0 2024    GLU 91 2024    CA 9.4 2024    ALB 4.2 2024    ALKPHO 244 2024    BILT 1.0 2024    TP 7.8 2024    AST 41 2024    ALT 29 2024    TROPHS 24 2024       Diagnostics:   XR CHEST AP PORTABLE  (CPT=71045)    Result Date: 2024  CONCLUSION: Moderate pulmonary edema, unchanged.  Trace bilateral pleural effusions are unchanged.  Consolidative airspace opacity within the right lower lobe is unchanged suggestive of atelectasis.    Dictated by (CST): Kane Garcia MD on 2024 at 7:05 PM     Finalized by (CST): Kane Garcia MD on 2024 at 7:09 PM           Allergies:  Allergies   Allergen Reactions    Lisinopril Coughing       Medications:    amiodarone    carvedilol    [START ON 2024] ferrous  sulfate    folic acid    mirtazapine    [START ON 5/25/2024] pantoprazole    sacubitril-valsartan    heparin    acetaminophen    acetaminophen **OR** HYDROcodone-acetaminophen **OR** HYDROcodone-acetaminophen    ondansetron    sodium zirconium cyclosilicate    [START ON 5/25/2024] furosemide    Assessment:   Acute hypoxic resp failure  - BNP >5000  - Chest x-ray with pulmonary edema  - On BiPap  - On lasix 40mg IVP BID  - Recently admitted with hypotension and dehydration and diuretics were on hold  - Was having close outpatient follow up, and was euvolemic on 5/20      Plan:    - Continue to monitor overnight  - Formal Cardiology consult to follow in AM.       KODI Jimenez  [facility]  5/24/2024  10:18 PM

## 2024-05-25 NOTE — OCCUPATIONAL THERAPY NOTE
Order received and chart reviewed. Attempted to see pt for OT today. Pt is requesting to finish breakfast prior to therapy. Will re-attempt later per pt's request.    Tara Magana, OTR/L

## 2024-05-26 LAB
ALBUMIN SERPL-MCNC: 3.6 G/DL (ref 3.2–4.8)
ANION GAP SERPL CALC-SCNC: 10 MMOL/L (ref 0–18)
BUN BLD-MCNC: 37 MG/DL (ref 9–23)
BUN/CREAT SERPL: 17 (ref 10–20)
CALCIUM BLD-MCNC: 8.9 MG/DL (ref 8.7–10.4)
CHLORIDE SERPL-SCNC: 104 MMOL/L (ref 98–112)
CO2 SERPL-SCNC: 19 MMOL/L (ref 21–32)
CREAT BLD-MCNC: 2.18 MG/DL
DEPRECATED HBV CORE AB SER IA-ACNC: 197.4 NG/ML
DEPRECATED RDW RBC AUTO: 60.8 FL (ref 35.1–46.3)
EGFRCR SERPLBLD CKD-EPI 2021: 23 ML/MIN/1.73M2 (ref 60–?)
ERYTHROCYTE [DISTWIDTH] IN BLOOD BY AUTOMATED COUNT: 18.3 % (ref 11–15)
GLUCOSE BLD-MCNC: 100 MG/DL (ref 70–99)
HCT VFR BLD AUTO: 21.8 %
HGB BLD-MCNC: 7.3 G/DL
IRON SATN MFR SERPL: 19 %
IRON SERPL-MCNC: 43 UG/DL
MCH RBC QN AUTO: 32.3 PG (ref 26–34)
MCHC RBC AUTO-ENTMCNC: 33.5 G/DL (ref 31–37)
MCV RBC AUTO: 96.5 FL
OSMOLALITY SERPL CALC.SUM OF ELEC: 285 MOSM/KG (ref 275–295)
PHOSPHATE SERPL-MCNC: 4.2 MG/DL (ref 2.4–5.1)
PLATELET # BLD AUTO: 268 10(3)UL (ref 150–450)
POTASSIUM SERPL-SCNC: 4.7 MMOL/L (ref 3.5–5.1)
RBC # BLD AUTO: 2.26 X10(6)UL
SODIUM SERPL-SCNC: 133 MMOL/L (ref 136–145)
TIBC SERPL-MCNC: 222 UG/DL (ref 250–425)
TRANSFERRIN SERPL-MCNC: 149 MG/DL (ref 250–380)
WBC # BLD AUTO: 5.4 X10(3) UL (ref 4–11)

## 2024-05-26 PROCEDURE — 99233 SBSQ HOSP IP/OBS HIGH 50: CPT | Performed by: INTERNAL MEDICINE

## 2024-05-26 PROCEDURE — 99233 SBSQ HOSP IP/OBS HIGH 50: CPT | Performed by: HOSPITALIST

## 2024-05-26 RX ORDER — FUROSEMIDE 40 MG/1
40 TABLET ORAL
Status: DISCONTINUED | OUTPATIENT
Start: 2024-05-26 | End: 2024-05-31

## 2024-05-26 NOTE — PROGRESS NOTES
[facility]  part of [facility]    Progress Note    Margaret Silverio Patient Status:  Inpatient    3/14/1945 MRN [id number]   Location [facility]5W Attending Raiza Alvarez MD   Hosp Day # 2 PCP Johnathon Rodriguez DO         Subjective:     Constitutional:  Negative for fever.   HENT:  Negative for congestion.    Respiratory:  Negative for cough and shortness of breath.    Cardiovascular:  Negative for chest pain and leg swelling.   Gastrointestinal:  Negative for abdominal distention.   Neurological:  Negative for seizures.   Psychiatric/Behavioral:  Negative for confusion and agitation.    And was seen and examined  Feeling better today  Denied any chest pain or shortness of breath or coughing stable    Objective:   Blood pressure 109/74, pulse 102, temperature 97.2 °F (36.2 °C), temperature source Axillary, resp. rate 20, height 5' 5\" (1.651 m), weight 124 lb 12.8 oz (56.6 kg), SpO2 100%.  Physical Exam  Constitutional:       General: She is not in acute distress.     Appearance: She is ill-appearing.   HENT:      Head: Normocephalic and atraumatic.      Nose: Nose normal.   Eyes:      General: No scleral icterus.  Cardiovascular:      Rate and Rhythm: Normal rate.      Heart sounds: Murmur heard.   Pulmonary:      Effort: No respiratory distress.      Breath sounds: No stridor. No wheezing, rhonchi or rales.   Abdominal:      General: Abdomen is flat. Bowel sounds are normal.      Palpations: Abdomen is soft.      Tenderness: There is no guarding.   Musculoskeletal:      Cervical back: Normal range of motion. No rigidity.      Right lower leg: No edema.      Left lower leg: No edema.   Neurological:      General: No focal deficit present.      Mental Status: She is oriented to person, place, and time.         Results:   Lab Results   Component Value Date    WBC 5.4 2024    HGB 7.3 (L) 2024    HCT 21.8 (L) 2024    .0 2024    CREATSERUM 2.18 (H)  05/26/2024    BUN 37 (H) 05/26/2024     (L) 05/26/2024    K 4.7 05/26/2024     05/26/2024    CO2 19.0 (L) 05/26/2024     (H) 05/26/2024    CA 8.9 05/26/2024    ALB 3.6 05/26/2024    ALKPHO 209 (H) 05/25/2024    BILT 1.1 05/25/2024    TP 6.8 05/25/2024    AST 32 05/25/2024    ALT 24 05/25/2024    PTT 35.2 02/09/2024    INR 1.25 (H) 02/09/2024    T4F 1.2 02/03/2024    TSH 10.681 (H) 02/03/2024    LIP 32 01/13/2024    ESRML 61 (H) 04/23/2024    CRP 5.10 (H) 04/23/2024    MG 2.2 05/25/2024    PHOS 4.2 05/26/2024    TROPHS 24 05/24/2024    B12 >2,000 (H) 02/03/2024       XR CHEST AP PORTABLE  (CPT=71045)    Result Date: 5/24/2024  CONCLUSION: Moderate pulmonary edema, unchanged.  Trace bilateral pleural effusions are unchanged.  Consolidative airspace opacity within the right lower lobe is unchanged suggestive of atelectasis.    Dictated by (CST): Kane Garcia MD on 5/24/2024 at 7:05 PM     Finalized by (CST): Kane Garcia MD on 5/24/2024 at 7:09 PM         EKG    Result Date: 5/25/2024  Atrial-sensed ventricular-paced rhythm Abnormal ECG When compared with ECG of 15-MAY-2024 15:53, Vent. rate has increased BY   6 BPM Confirmed by RAAD LY, YOLANDA ([id number]) on 5/25/2024 9:32:23 AM     Assessment & Plan:      1- acute on chronic HFrEF  LVEf 15 % with acute respiratory failure with hypoxia  CXR moderate edema with a trace basilar effusion and atelectasis  Very high BNP   No leukocytosis  Minimal history of smoking quit years ago with no clinical evidence of airway disease     Diuretics, Entresto, beta-blocker  Cardiology following    Much better overall today on room air     2-severe rheumatoid arthritis with joint deformities     3-DVT prophylaxis  Heparin subcu                  Linsey Liao MD  5/26/2024

## 2024-05-26 NOTE — PROGRESS NOTES
Progress Note  Margaret Silverio Patient Status:  Inpatient    3/14/1945 MRN [id number]   Location [facility]5W Attending Raiza Alvarez MD   Hosp Day # 2 PCP Johnathon Rodriguez,      Subjective:  Short of breath with ambulation to bathroom, otherwise denies complaints    Objective:  /79 (BP Location: Right arm)   Pulse 103   Temp 98.7 °F (37.1 °C) (Oral)   Resp 20   Ht 5' 5\" (1.651 m)   Wt 124 lb 12.8 oz (56.6 kg)   SpO2 97%   BMI 20.77 kg/m²     Telemetry:     Intake/Output:    Intake/Output Summary (Last 24 hours) at 2024 0754  Last data filed at 2024 0600  Gross per 24 hour   Intake 760 ml   Output 200 ml   Net 560 ml     Last 3 Weights   24 0500 124 lb 12.8 oz (56.6 kg)   24 0504 123 lb 10.9 oz (56.1 kg)   24 2241 123 lb 14.4 oz (56.2 kg)   24 1724 125 lb 10.6 oz (57 kg)   24 0633 127 lb (57.6 kg)   24 0510 119 lb 8 oz (54.2 kg)   24 0500 129 lb 12.8 oz (58.9 kg)   24 1104 126 lb (57.2 kg)     Labs:  Recent Labs   Lab 24  1936 24  0348 24  0629   GLU 91 79 100*   BUN 30* 26* 37*   CREATSERUM 1.47* 1.53* 2.18*   EGFRCR 36* 35* 23*   CA 9.4 9.1 8.9   * 136 133*   K 5.9* 5.1 4.7    107 104   CO2 20.0* 17.0* 19.0*     Recent Labs   Lab 24  1750 24  0348 24  0543   RBC 2.69* 2.41* 2.26*   HGB 8.3* 7.4* 7.3*   HCT 27.7* 23.7* 21.8*   .0* 98.3 96.5   MCH 30.9 30.7 32.3   MCHC 30.0* 31.2 33.5   RDW 18.7* 17.7* 18.3*   NEPRELIM 3.67 3.33  --    WBC 5.3 5.0 5.4   .0 273.0 268.0     Recent Labs   Lab 24   TROPHS      Lab Results   Component Value Date/Time    HDL 54 02/10/2024 12:38 PM    LDL 63 02/10/2024 12:38 PM    TRIG 91 02/10/2024 12:38 PM     No results found for: \"DDIMER\"  Lab Results   Component Value Date    TSH 10.681 (H) 2024     Review of Systems:   Constitutional: No fevers, chills, fatigue or night sweats.  ENT: No mouth pain, neck pain,  running nose, headaches or swollen glands.  Skin: No rashes, pruritus or skin changes,  Respiratory: Denies cough, wheezing or shortness of breath.  CV: Denies chest pain, palpitations, orthopnea, PND or dizziness.  Musculoskeletal: No joint pain, stiffness or swelling.  GI: No nausea, vomiting or diarrhea. No blood in stools.  Neurologic: No seizures, tremors, weakness or numbness.     Physical Exam:  General: Alert, cooperative, no distress, appears stated age.  Neck: Supple, symmetrical, trachea midline, no adenopathy, thyroid: no enlargment/tenderness/nodules, no carotid bruit and no JVD.  Lungs: Clear to auscultation bilaterally.  Chest wall: No tenderness or deformity.  Heart: Regular rate and rhythm, S1, S2 normal, no murmur, click, rub or gallop.  Abdomen: Soft, non-tender. Bowel sounds normal. No masses,  No organomegaly.  Extremities: Extremities normal, atraumatic, no cyanosis or edema.  Pulses: 2+ and symmetric all extremities.  Neurologic: Grossly intact.    Medications:   lidocaine-menthol  1 patch Transdermal Daily    amiodarone  200 mg Oral Daily    carvedilol  3.125 mg Oral BID with meals    ferrous sulfate  325 mg Oral Daily with breakfast    folic acid  800 mcg Oral Daily    mirtazapine  7.5 mg Oral Nightly    pantoprazole  40 mg Oral BID AC    sacubitril-valsartan  1 tablet Oral BID    heparin  5,000 Units Subcutaneous Q8H BROOKLYNN    furosemide  40 mg Intravenous BID (Diuretic)     Assessment:    Acute on chronic HFrEF  Nonischemic cardiomyopathy  Recently admitted for hypotension/dehydration, discharged off diuretics, now presented to ER on 5/25 with fluid overload  -recently seen in office by APN and noted to be euvolemic a few days PTA  -LVEF 15-20% on February echo   -s/p DC-ICD  -BNP >5,000 on admission, CXR with pulmonary edema  -previously on bipap,  now on room air  -diuresing with IV lasix BID, I/Os not accurately documented  -GDMT: KAITLYNN RICHARDS, previously on aldactone, stopped at last  admission    Valvular dysfunction  Severe tricuspid regurgitation, hx bioprosthetic mitral valve replacement    Atrial flutter/atrial fibrillation   on tele  -amiodarone  -s/p Watchman    Plan:  -Transition to PO lasix BID due to previous dehydration on torsemide  -heart failure order set placed  -Monitor strict I/Os, daily weights, daily BMP  -Continue coreg, entresto  -continue amiodarone    Plan of care discussed with patient, RN.      Delisa Bey, APRN  5/26/2024  7:54 AM  [phone] [place]  [phone] samy

## 2024-05-26 NOTE — PLAN OF CARE
A&Ox4. Pt ambulates stand by assist with a rolling walker, voiding via purewick, tolerating cardiac diet with 1800 fluid restriction diet, on room air, norco provided as needed for pain. Frequent rounding by nursing staff. Safety precautions maintained/call light within reach. Plan to discharge tomorrow pending medical clearance.    Problem: Patient Centered Care  Goal: Patient preferences are identified and integrated in the patient's plan of care  Description: Interventions:  - What would you like us to know as we care for you? I'm from home with   - Provide timely, complete, and accurate information to patient/family  - Incorporate patient and family knowledge, values, beliefs, and cultural backgrounds into the planning and delivery of care  - Encourage patient/family to participate in care and decision-making at the level they choose  - Honor patient and family perspectives and choices  Outcome: Progressing     Problem: Patient/Family Goals  Goal: Patient/Family Long Term Goal  Description: Patient's Long Term Goal: discharge    Interventions:  - diuretic as ordered  - See additional Care Plan goals for specific interventions  Outcome: Progressing  Goal: Patient/Family Short Term Goal  Description: Patient's Short Term Goal: feel better    Interventions:   - manage pain   - See additional Care Plan goals for specific interventions  Outcome: Progressing     Problem: RESPIRATORY - ADULT  Goal: Achieves optimal ventilation and oxygenation  Description: INTERVENTIONS:  - Assess for changes in respiratory status  - Assess for changes in mentation and behavior  - Position to facilitate oxygenation and minimize respiratory effort  - Oxygen supplementation based on oxygen saturation or ABGs  - Provide Smoking Cessation handout, if applicable  - Encourage broncho-pulmonary hygiene including cough, deep breathe, Incentive Spirometry  - Assess the need for suctioning and perform as needed  - Assess and instruct to  report SOB or any respiratory difficulty  - Respiratory Therapy support as indicated  - Manage/alleviate anxiety  - Monitor for signs/symptoms of CO2 retention  Outcome: Progressing     Problem: CARDIOVASCULAR - ADULT  Goal: Maintains optimal cardiac output and hemodynamic stability  Description: INTERVENTIONS:  - Monitor vital signs, rhythm, and trends  - Monitor for bleeding, hypotension and signs of decreased cardiac output  - Evaluate effectiveness of vasoactive medications to optimize hemodynamic stability  - Monitor arterial and/or venous puncture sites for bleeding and/or hematoma  - Assess quality of pulses, skin color and temperature  - Assess for signs of decreased coronary artery perfusion - ex. Angina  - Evaluate fluid balance, assess for edema, trend weights  Outcome: Progressing  Goal: Absence of cardiac arrhythmias or at baseline  Description: INTERVENTIONS:  - Continuous cardiac monitoring, monitor vital signs, obtain 12 lead EKG if indicated  - Evaluate effectiveness of antiarrhythmic and heart rate control medications as ordered  - Initiate emergency measures for life threatening arrhythmias  - Monitor electrolytes and administer replacement therapy as ordered  Outcome: Progressing

## 2024-05-26 NOTE — PLAN OF CARE
Problem: Patient Centered Care  Goal: Patient preferences are identified and integrated in the patient's plan of care  Description: Interventions:  - What would you like us to know as we care for you? Patient is from home  - Provide timely, complete, and accurate information to patient/family  - Incorporate patient and family knowledge, values, beliefs, and cultural backgrounds into the planning and delivery of care  - Encourage patient/family to participate in care and decision-making at the level they choose  - Honor patient and family perspectives and choices  Outcome: Progressing     Problem: Patient/Family Goals  Goal: Patient/Family Long Term Goal  Description: Patient's Long Term Goal: To go home    Interventions  - See additional Care Plan goals for specific interventions  Outcome: Progressing  Goal: Patient/Family Short Term Goal  Description: Patient's Short Term Goal:To feel better     Interventions:  - See additional Care Plan goals for specific interventions  Outcome: Progressing     Problem: RESPIRATORY - ADULT  Goal: Achieves optimal ventilation and oxygenation  Description: INTERVENTIONS:  - Assess for changes in respiratory status  - Assess for changes in mentation and behavior  - Position to facilitate oxygenation and minimize respiratory effort  - Oxygen supplementation based on oxygen saturation or ABGs  - Provide Smoking Cessation handout, if applicable  - Encourage broncho-pulmonary hygiene including cough, deep breathe, Incentive Spirometry  - Assess the need for suctioning and perform as needed  - Assess and instruct to report SOB or any respiratory difficulty  - Respiratory Therapy support as indicated  - Manage/alleviate anxiety  - Monitor for signs/symptoms of CO2 retention  Outcome: Progressing     Problem: CARDIOVASCULAR - ADULT  Goal: Maintains optimal cardiac output and hemodynamic stability  Description: INTERVENTIONS:  - Monitor vital signs, rhythm, and trends  - Monitor for  bleeding, hypotension and signs of decreased cardiac output  - Evaluate effectiveness of vasoactive medications to optimize hemodynamic stability  - Monitor arterial and/or venous puncture sites for bleeding and/or hematoma  - Assess quality of pulses, skin color and temperature  - Assess for signs of decreased coronary artery perfusion - ex. Angina  - Evaluate fluid balance, assess for edema, trend weights  Outcome: Progressing  Goal: Absence of cardiac arrhythmias or at baseline  Description: INTERVENTIONS:  - Continuous cardiac monitoring, monitor vital signs, obtain 12 lead EKG if indicated  - Evaluate effectiveness of antiarrhythmic and heart rate control medications as ordered  - Initiate emergency measures for life threatening arrhythmias  - Monitor electrolytes and administer replacement therapy as ordered  Outcome: Progressing   Patient is on room air. IV Lasix. Patient reports dyspnea with exertion. Duonebs PRN. VSS. [place] for chronic neck pain. Will continue to monitor patient.

## 2024-05-26 NOTE — PROGRESS NOTES
[facility]  part of [facility]    Progress Note    Margaret Silverio Patient Status:  Inpatient    3/14/1945 MRN [id number]   Location [facility]5W Attending Raiza Alvarez MD   Hosp Day # 2 PCP Johnathon Rodriguez,        Subjective:   Margaret Silverio is feeling ok. No sob. No chest pain.     Objective:   Blood pressure 109/74, pulse 102, temperature 97.2 °F (36.2 °C), temperature source Axillary, resp. rate 20, height 5' 5\" (1.651 m), weight 124 lb 12.8 oz (56.6 kg), SpO2 100%.    Physical Exam:    General: No acute distress.   Respiratory: Clear to auscultation bilaterally. No wheezes. No rhonchi.  Cardiovascular: S1, S2. Regular rate and rhythm. No murmurs, rubs or gallops.   Abdomen: Soft, nontender, nondistended.  Positive bowel sounds. No rebound or guarding.  Neurologic: No focal neurological deficits.   Musculoskeletal: Moves all extremities.  Extremities: No edema.    Results:     Lab Results   Component Value Date    WBC 5.4 2024    HGB 7.3 (L) 2024    HCT 21.8 (L) 2024    .0 2024    CREATSERUM 2.18 (H) 2024    BUN 37 (H) 2024     (L) 2024    K 4.7 2024     2024    CO2 19.0 (L) 2024     (H) 2024    CA 8.9 2024    ALB 3.6 2024    ALKPHO 209 (H) 2024    BILT 1.1 2024    TP 6.8 2024    AST 32 2024    ALT 24 2024    PTT 35.2 2024    INR 1.25 (H) 2024    T4F 1.2 2024    TSH 10.681 (H) 2024    LIP 32 2024    ESRML 61 (H) 2024    CRP 5.10 (H) 2024    MG 2.2 2024    PHOS 4.2 2024    B12 >2,000 (H) 2024       XR CHEST AP PORTABLE  (CPT=71045)    Result Date: 2024  CONCLUSION: Moderate pulmonary edema, unchanged.  Trace bilateral pleural effusions are unchanged.  Consolidative airspace opacity within the right lower lobe is unchanged suggestive of atelectasis.    Dictated by  (CST): Kane Garcia MD on 5/24/2024 at 7:05 PM     Finalized by (CST): Kane Garcia MD on 5/24/2024 at 7:09 PM         EKG    Result Date: 5/25/2024  Atrial-sensed ventricular-paced rhythm Abnormal ECG When compared with ECG of 15-MAY-2024 15:53, Vent. rate has increased BY   6 BPM Confirmed by RAAD LY, YOLANDA ([id number]) on 5/25/2024 9:32:23 AM      furosemide  40 mg Oral BID (Diuretic)    [START ON 5/27/2024] iron sucrose  200 mg Intravenous Daily    lidocaine-menthol  1 patch Transdermal Daily    amiodarone  200 mg Oral Daily    carvedilol  3.125 mg Oral BID with meals    folic acid  800 mcg Oral Daily    mirtazapine  7.5 mg Oral Nightly    pantoprazole  40 mg Oral BID AC    sacubitril-valsartan  1 tablet Oral BID    heparin  5,000 Units Subcutaneous Q8H BROOKLYNN       ipratropium-albuterol    acetaminophen    acetaminophen **OR** HYDROcodone-acetaminophen **OR** HYDROcodone-acetaminophen    ondansetron    Assessment and Plan:        Acute respiratory failure with hypoxia (HCC)  -Patient presenting with shortness of breath  -In ED noted to have increased work of breathing and hypoxia  -Patient was started on noninvasive ventilation with improvement of symptoms.  -Chest x-ray reviewed.  Noted moderate pulmonary edema, trace bilateral pleural effusions both of which appear unchanged from recent imaging.  Patient also with right lower lobe airspace opacity which again appeared unchanged from previous, likely atelectasis.  -BNP noted to be grossly elevated, greater than 5000  -Change lasix to PO  -Pulmonology and cardiology consulted, appreciate recommendations     Acute on chronic heart failure with severely reduced ejection fraction   - Last known EF 15%  -Continue on PO lasix   - Strict I&Os, Daily weights, Fluid restriction  - Cardiology on consult, appreciate recs.      Hyperkalemia  -in setting of CKD  -Telemetry monitoring  -S/p Lasix  -Resolving   -Nephrology on consult      CKD  III   -Worsening  -change lasix to  PO  - Avoiding Nephrotoxic agents  - Cont to monitor     Acute Iron Deficiency anemia  - IV iron   -Without signs of acute blood loss  -Hemoglobin improved from previous  -Continue to monitor            Quality:  DVT Prophylaxis: Heparin  CODE status: Full    MDM .high      FREYA COMBS MD  05/26/24

## 2024-05-26 NOTE — PROGRESS NOTES
[facility]  part of [facility]    Progress Note    Margaret Silverio Patient Status:  Inpatient    3/14/1945 MRN [id number]   Location [facility]5W Attending Raiza Alvarez MD   Hosp Day # 2 PCP Johnathon Rodriguez, DO       Subjective:   Margaret Silverio is a(n) 79 year old female who I am seeing for ckd/vol overload    Feels much better  Breathing has improved, only while ambulating      Objective:   /74 (BP Location: Right arm)   Pulse 102   Temp 97.2 °F (36.2 °C) (Axillary)   Resp 20   Ht 5' 5\" (1.651 m)   Wt 124 lb 12.8 oz (56.6 kg)   SpO2 100%   BMI 20.77 kg/m²      Intake/Output Summary (Last 24 hours) at 2024 1315  Last data filed at 2024 1149  Gross per 24 hour   Intake 885 ml   Output 200 ml   Net 685 ml     Wt Readings from Last 1 Encounters:   24 124 lb 12.8 oz (56.6 kg)       Exam  Vital signs: Blood pressure 109/74, pulse 102, temperature 97.2 °F (36.2 °C), temperature source Axillary, resp. rate 20, height 5' 5\" (1.651 m), weight 124 lb 12.8 oz (56.6 kg), SpO2 100%.    General: No acute distress. Alert and oriented x 3.  HEENT: Moist mucous membranes. EOMI. PERRLA  Neck:  No JVD.   Respiratory: Clear to auscultation bilaterally.    Cardiovascular: S1, S2.  Regular rate and rhythm.   Abdomen: Soft, nontender, nondistended.    Neurologic: No focal neurological deficits.  Musculoskeletal: Full range of motion of all extremities.  No swelling noted.  Access:    Results:     Recent Labs   Lab 24  1750 24  0348 24  0543   RBC 2.69* 2.41* 2.26*   HGB 8.3* 7.4* 7.3*   HCT 27.7* 23.7* 21.8*   .0* 98.3 96.5   MCH 30.9 30.7 32.3   MCHC 30.0* 31.2 33.5   RDW 18.7* 17.7* 18.3*   NEPRELIM 3.67 3.33  --    WBC 5.3 5.0 5.4   .0 273.0 268.0         Recent Labs   Lab 24  19324  0629   GLU 91 79 100*   BUN 30* 26* 37*   CREATSERUM 1.47* 1.53* 2.18*   CA 9.4 9.1 8.9   * 136 133*   K 5.9*  5.1 4.7    107 104   CO2 20.0* 17.0* 19.0*          XR CHEST AP PORTABLE  (CPT=71045)    Result Date: 5/24/2024  CONCLUSION: Moderate pulmonary edema, unchanged.  Trace bilateral pleural effusions are unchanged.  Consolidative airspace opacity within the right lower lobe is unchanged suggestive of atelectasis.    Dictated by (CST): Kane Garcia MD on 5/24/2024 at 7:05 PM     Finalized by (CST): Kane Garcia MD on 5/24/2024 at 7:09 PM           Assessment and Plan:     79 year old female with past medical history of HTN, CKD 3b, HFrEF (EF 15-20%) + grade 4 DD, s/p AICD, A.fib, s/p bioprosthetic MVR, RA, and h/o GIB/AVMs, who was recently admitted from 5/16-5/18 for hypotension and mega/ckd. S/p IVF and cr improved. Was discharged without diuretics.  She presented serge to ER with sob/ CHF exacerbation. Started on IV lasix. Cr at baseline on admission.   B/l cr 1.3-1.6 mg/dl     Sees Dr Arvizu as outpatient    Impression:    CKD 3 cr at b/l 1.4-1.6 mg/dl/ monitor cr with diuresis. [place] UA-cr bumped up with diuretics  HTN with CKD: acceptable. On coreg and entresto  Hyperkalemia s/p medical management . On entresto at home.  CHFrEF exacerbation, IV diuretics--> transition to PO diuretics  Anemia, can be contributing to her sob. IV iron and will give ROSA      Plan:    Bump in creatinine with diuretics  Agree with switching from IV to PO lasix  IV iron  Henry anemia  Will give rosa after iv iron done    Thank you very much for allowing me to participate in the care of your patient.  If you have any questions, please do not hesitate to contact me.     Keerthi Naqvi MD  5/26/2024

## 2024-05-27 LAB
ANION GAP SERPL CALC-SCNC: 10 MMOL/L (ref 0–18)
BUN BLD-MCNC: 49 MG/DL (ref 9–23)
BUN/CREAT SERPL: 20.2 (ref 10–20)
CALCIUM BLD-MCNC: 8.9 MG/DL (ref 8.7–10.4)
CHLORIDE SERPL-SCNC: 106 MMOL/L (ref 98–112)
CO2 SERPL-SCNC: 20 MMOL/L (ref 21–32)
CREAT BLD-MCNC: 2.42 MG/DL
EGFRCR SERPLBLD CKD-EPI 2021: 20 ML/MIN/1.73M2 (ref 60–?)
GLUCOSE BLD-MCNC: 129 MG/DL (ref 70–99)
OSMOLALITY SERPL CALC.SUM OF ELEC: 297 MOSM/KG (ref 275–295)
POTASSIUM SERPL-SCNC: 4 MMOL/L (ref 3.5–5.1)
SODIUM SERPL-SCNC: 136 MMOL/L (ref 136–145)
SODIUM SERPL-SCNC: 17 MMOL/L

## 2024-05-27 PROCEDURE — 99232 SBSQ HOSP IP/OBS MODERATE 35: CPT | Performed by: INTERNAL MEDICINE

## 2024-05-27 PROCEDURE — 99233 SBSQ HOSP IP/OBS HIGH 50: CPT | Performed by: HOSPITALIST

## 2024-05-27 NOTE — PROGRESS NOTES
[facility] Cardiology Progress Note    Margaret Silverio Patient Status:  Inpatient    3/14/1945 MRN [id number]   Location [facility]5W Attending Raiza Alvarez MD   Hosp Day # 3 PCP Johnathon Rodriguez,      Subjective:  Denies cp, sob, orthopnea.     Objective:  /67 (BP Location: Right arm)   Pulse 60   Temp 97.7 °F (36.5 °C) (Oral)   Resp 20   Ht 165.1 cm (5' 5\")   Wt 130 lb 8 oz (59.2 kg)   SpO2 100%   BMI 21.72 kg/m²     Telemetry: AV paced       Intake/Output:    Intake/Output Summary (Last 24 hours) at 2024 0923  Last data filed at 2024 0500  Gross per 24 hour   Intake 855 ml   Output 150 ml   Net 705 ml       Last 3 Weights   24 0642 130 lb 8 oz (59.2 kg)   24 0500 124 lb 12.8 oz (56.6 kg)   24 0504 123 lb 10.9 oz (56.1 kg)   24 2241 123 lb 14.4 oz (56.2 kg)   24 1724 125 lb 10.6 oz (57 kg)   24 0633 127 lb (57.6 kg)   24 0510 119 lb 8 oz (54.2 kg)   24 0500 129 lb 12.8 oz (58.9 kg)   24 1104 126 lb (57.2 kg)       Labs:  Recent Labs   Lab 24  1936 24  0348 24  0629   GLU 91 79 100*   BUN 30* 26* 37*   CREATSERUM 1.47* 1.53* 2.18*   EGFRCR 36* 35* 23*   CA 9.4 9.1 8.9   * 136 133*   K 5.9* 5.1 4.7    107 104   CO2 20.0* 17.0* 19.0*     Recent Labs   Lab 24  1750 24  0348 24  0543   RBC 2.69* 2.41* 2.26*   HGB 8.3* 7.4* 7.3*   HCT 27.7* 23.7* 21.8*   .0* 98.3 96.5   MCH 30.9 30.7 32.3   MCHC 30.0* 31.2 33.5   RDW 18.7* 17.7* 18.3*   NEPRELIM 3.67 3.33  --    WBC 5.3 5.0 5.4   .0 273.0 268.0         Recent Labs   Lab 24   [facility]       Diagnostics:   2/10/24 Echo  1. Left ventricle: The cavity size was normal. Wall thickness was normal.      Systolic function was normal. The estimated ejection fraction was 15-20%,      by biplane method of disks. Doppler parameters are consistent with an      irreversible restrictive pattern, indicative  of decreased left      ventricular diastolic compliance and/or increased left atrial pressure -      grade 4 diastolic dysfunction.   2. Right ventricle: The cavity size was markedly increased. Pacer wire noted      in the right ventricle. Although right ventricular systolic pressure was      not increased relative to right atrial pressure, the right atrial      pressure would appear to be markedly elevated as a result of wide-open      tricuspid regurgitation. Absolute RV systolic pressure was, therefore, at      least moderately increased.   3. Ventricular septum: Septal motion was dyssynergic.   4. Left atrium: The atrium was increased in size. Echodensity seen in LA.   5. Right atrium: The atrium was markedly dilated. Pacer wire noted in right      atrium.   6. Mitral valve: A bioprosthesis is present and functioning normally.   7. Tricuspid valve: There was malcoaptation of the valve leaflets. There was      wide-open regurgitation.       Review of Systems   Respiratory: Negative.     Cardiovascular: Negative.      Physical Exam:    General: Alert and oriented x 3. No apparent distress.   HEENT: Normocephalic, anicteric sclera, neck supple, no thyromegaly or adenopathy.  Neck: No JVD, carotids 2+, no bruits.  Cardiac: Regular rate & rhythm. S1, S2 normal. No  pericardial rub, S3, or extra cardiac sounds. +Murmur  Lungs: Clear without wheezes, rales, rhonchi or dullness.  Normal excursions and effort.  Abdomen: Soft, non-tender. No organosplenomegally, mass or rebound, BS-present.  Extremities: Without clubbing or cyanosis.  No left lower extremity edema, no right lower extremity edema.  Neurologic: Alert and oriented, normal affect. No focal defects  Skin: Warm and dry.       Medications:   furosemide  40 mg Oral BID (Diuretic)    iron sucrose  200 mg Intravenous Daily    lidocaine-menthol  1 patch Transdermal Daily    amiodarone  200 mg Oral Daily    carvedilol  3.125 mg Oral BID with meals    folic acid  800  mcg Oral Daily    mirtazapine  7.5 mg Oral Nightly    pantoprazole  40 mg Oral BID AC    sacubitril-valsartan  1 tablet Oral BID    heparin  5,000 Units Subcutaneous Q8H BROOKLYNN         Assessment:    Acute on chronic HFrEF  Nonischemic cardiomyopathy  Recently admitted for hypotension/dehydration, discharged off diuretics, now presented to ER on 5/25 with fluid overload  -recently seen in office by APN and noted to be euvolemic a few days PTA  -LVEF 15-20% on February echo  -s/p DC-ICD  -BNP >5,000 on admission, CXR with mod pulmonary edema  -previously on bipap,  now on room air  -switched to po lasix yesterday.  I/Os not accurately documented  -GDMT: BB, KAITLYNN. previously on aldactone, stopped at last admission     Valvular dysfunction  Severe tricuspid regurgitation, hx bioprosthetic mitral valve replacement     Atrial flutter/atrial fibrillation   on tele  -amiodarone  -s/p Watchman    CKD 3   -baseline 1.4-1.6   -Scr bump yesterday. Switched to po lasix   -Nephrology following     Plan:    Compensated on exam . Sp02 stable on room air   Scr bump yesterday & switched to po lasix   CXR & labs pending this AM   Continue po lasix, entresto, coreg, amiodarone   Monitor accurate I/o, daily wts & renal fx closely     YOLIE Bucio  5/27/2024  9:23 AM  Ph [phone] ([place])  Ph [phone] ([place])

## 2024-05-27 NOTE — PLAN OF CARE
Patient is A/0x4, diuretics on hold per nephrology, norco for pain, call light in place and calls appropriately     Problem: Patient Centered Care  Goal: Patient preferences are identified and integrated in the patient's plan of care  Description: Interventions:  - What would you like us to know as we care for you? From home with spouse   - Provide timely, complete, and accurate information to patient/family  - Incorporate patient and family knowledge, values, beliefs, and cultural backgrounds into the planning and delivery of care  - Encourage patient/family to participate in care and decision-making at the level they choose  - Honor patient and family perspectives and choices  Outcome: Progressing     Problem: Patient/Family Goals  Goal: Patient/Family Long Term Goal  Description: Patient's Long Term Goal:     Interventions:  -   - See additional Care Plan goals for specific interventions  Outcome: Progressing  Goal: Patient/Family Short Term Goal  Description: Patient's Short Term Goal: Pain management    Interventions:   - PRN medication   -lidocaine patch   -assessment for pain   - See additional Care Plan goals for specific interventions  Outcome: Progressing     Problem: RESPIRATORY - ADULT  Goal: Achieves optimal ventilation and oxygenation  Description: INTERVENTIONS:  - Assess for changes in respiratory status  - Assess for changes in mentation and behavior  - Position to facilitate oxygenation and minimize respiratory effort  - Oxygen supplementation based on oxygen saturation or ABGs  - Provide Smoking Cessation handout, if applicable  - Encourage broncho-pulmonary hygiene including cough, deep breathe, Incentive Spirometry  - Assess the need for suctioning and perform as needed  - Assess and instruct to report SOB or any respiratory difficulty  - Respiratory Therapy support as indicated  - Manage/alleviate anxiety  - Monitor for signs/symptoms of CO2 retention  Outcome: Progressing     Problem:  CARDIOVASCULAR - ADULT  Goal: Maintains optimal cardiac output and hemodynamic stability  Description: INTERVENTIONS:  - Monitor vital signs, rhythm, and trends  - Monitor for bleeding, hypotension and signs of decreased cardiac output  - Evaluate effectiveness of vasoactive medications to optimize hemodynamic stability  - Monitor arterial and/or venous puncture sites for bleeding and/or hematoma  - Assess quality of pulses, skin color and temperature  - Assess for signs of decreased coronary artery perfusion - ex. Angina  - Evaluate fluid balance, assess for edema, trend weights  Outcome: Progressing  Goal: Absence of cardiac arrhythmias or at baseline  Description: INTERVENTIONS:  - Continuous cardiac monitoring, monitor vital signs, obtain 12 lead EKG if indicated  - Evaluate effectiveness of antiarrhythmic and heart rate control medications as ordered  - Initiate emergency measures for life threatening arrhythmias  - Monitor electrolytes and administer replacement therapy as ordered  Outcome: Progressing

## 2024-05-27 NOTE — PLAN OF CARE
Pt A&Ox4. BUE contractures r/t arthritis hx. Plan is to diurese pr. BLE edema non-pitting. Pt calls appropriately SBA w/ walker. Call light within reach, rounded hourly overnight.       Problem: Patient Centered Care  Goal: Patient preferences are identified and integrated in the patient's plan of care  Description: Interventions:  - What would you like us to know as we care for you? From home w/   - Provide timely, complete, and accurate information to patient/family  - Incorporate patient and family knowledge, values, beliefs, and cultural backgrounds into the planning and delivery of care  - Encourage patient/family to participate in care and decision-making at the level they choose  - Honor patient and family perspectives and choices  Outcome: Progressing     Problem: Patient/Family Goals  Goal: Patient/Family Long Term Goal  Description: Patient's Long Term Goal: return home    Interventions:  - follow poc  - See additional Care Plan goals for specific interventions  Outcome: Progressing  Goal: Patient/Family Short Term Goal  Description: Patient's Short Term Goal: free from sob    Interventions:   - follow poc   - See additional Care Plan goals for specific interventions  Outcome: Progressing     Problem: RESPIRATORY - ADULT  Goal: Achieves optimal ventilation and oxygenation  Description: INTERVENTIONS:  - Assess for changes in respiratory status  - Assess for changes in mentation and behavior  - Position to facilitate oxygenation and minimize respiratory effort  - Oxygen supplementation based on oxygen saturation or ABGs  - Provide Smoking Cessation handout, if applicable  - Encourage broncho-pulmonary hygiene including cough, deep breathe, Incentive Spirometry  - Assess the need for suctioning and perform as needed  - Assess and instruct to report SOB or any respiratory difficulty  - Respiratory Therapy support as indicated  - Manage/alleviate anxiety  - Monitor for signs/symptoms of CO2  retention  Outcome: Progressing     Problem: CARDIOVASCULAR - ADULT  Goal: Maintains optimal cardiac output and hemodynamic stability  Description: INTERVENTIONS:  - Monitor vital signs, rhythm, and trends  - Monitor for bleeding, hypotension and signs of decreased cardiac output  - Evaluate effectiveness of vasoactive medications to optimize hemodynamic stability  - Monitor arterial and/or venous puncture sites for bleeding and/or hematoma  - Assess quality of pulses, skin color and temperature  - Assess for signs of decreased coronary artery perfusion - ex. Angina  - Evaluate fluid balance, assess for edema, trend weights  Outcome: Progressing  Goal: Absence of cardiac arrhythmias or at baseline  Description: INTERVENTIONS:  - Continuous cardiac monitoring, monitor vital signs, obtain 12 lead EKG if indicated  - Evaluate effectiveness of antiarrhythmic and heart rate control medications as ordered  - Initiate emergency measures for life threatening arrhythmias  - Monitor electrolytes and administer replacement therapy as ordered  Outcome: Progressing

## 2024-05-27 NOTE — PROGRESS NOTES
[facility]  part of [facility]    Progress Note    Margaret Silverio Patient Status:  Inpatient    3/14/1945 MRN [id number]   Location [facility]5W Attending Raiza Alvarez MD   Hosp Day # 3 PCP Johnathon Rodriguez, DO       Subjective:   Margaret Silevrio is a(n) 79 year old female who I am seeing for ckd/vol overload    Feels much better  Breathing is better      Objective:   /67 (BP Location: Right arm)   Pulse 60   Temp 97.7 °F (36.5 °C) (Oral)   Resp 20   Ht 5' 5\" (1.651 m)   Wt 130 lb 8 oz (59.2 kg)   SpO2 100%   BMI 21.72 kg/m²      Intake/Output Summary (Last 24 hours) at 2024 1121  Last data filed at 2024 0500  Gross per 24 hour   Intake 845 ml   Output 150 ml   Net 695 ml     Wt Readings from Last 1 Encounters:   24 130 lb 8 oz (59.2 kg)       Exam  Vital signs: Blood pressure 138/67, pulse 60, temperature 97.7 °F (36.5 °C), temperature source Oral, resp. rate 20, height 5' 5\" (1.651 m), weight 130 lb 8 oz (59.2 kg), SpO2 100%.    General: No acute distress. Alert and oriented x 3.  HEENT: Moist mucous membranes. EOMI. PERRLA  Neck:  No JVD.   Respiratory: Clear to auscultation bilaterally.    Cardiovascular: S1, S2.  Regular rate and rhythm.   Abdomen: Soft, nontender, nondistended.    Neurologic: No focal neurological deficits.  Musculoskeletal: Full range of motion of all extremities.  No swelling noted.  Access:    Results:     Recent Labs   Lab 24  1750 24  0348 24  0543   RBC 2.69* 2.41* 2.26*   HGB 8.3* 7.4* 7.3*   HCT 27.7* 23.7* 21.8*   .0* 98.3 96.5   MCH 30.9 30.7 32.3   MCHC 30.0* 31.2 33.5   RDW 18.7* 17.7* 18.3*   NEPRELIM 3.67 3.33  --    WBC 5.3 5.0 5.4   .0 273.0 268.0         Recent Labs   Lab 24  0348 24  0629 24  0954   GLU 79 100* 129*   BUN 26* 37* 49*   CREATSERUM 1.53* 2.18* 2.42*   CA 9.1 8.9 8.9    133* 136   K 5.1 4.7 4.0    104 106   CO2 17.0* 19.0*  20.0*          No results found.    Assessment and Plan:     79 year old female with past medical history of HTN, CKD 3b, HFrEF (EF 15-20%) + grade 4 DD, s/p AICD, A.fib, s/p bioprosthetic MVR, RA, and h/o GIB/AVMs, who was recently admitted from 5/16-5/18 for hypotension and george/ckd. S/p IVF and cr improved. Was discharged without diuretics.  She presented serge to ER with sob/ CHF exacerbation. Started on IV lasix. Cr at baseline on admission.   B/l cr 1.3-1.6 mg/dl     Sees Dr Arvizu as outpatient    Impression:    GEORGE likely over diuresed. Hold lasix for today, bladder scan and urine sodium  CKD 3 cr at b/l 1.4-1.6 mg/dl/ monitor cr with diuresis. [place] UA-cr bumped up with diuretics  HTN with CKD: acceptable. On coreg and entresto  Hyperkalemia s/p medical management . On entresto at home.  CHFrEF exacerbation, IV diuretics--> transition to PO diuretics  Anemia, can be contributing to her sob. IV iron and will give ANH      Plan:    Bump in creatinine with diuretics-hold for today, compensated currently  IV iron, give ANH  Henry anemia  Bl;adder scan, UA and urine sodium. Labs in am    Thank you very much for allowing me to participate in the care of your patient.  If you have any questions, please do not hesitate to contact me.     Keerthi Naqvi MD

## 2024-05-27 NOTE — PROGRESS NOTES
[facility]  part of [facility]    Progress Note    Margaret Silverio Patient Status:  Inpatient    3/14/1945 MRN [id number]   Location [facility]5W Attending Raiza Alvarez MD   Hosp Day # 3 PCP Johnathon Rodriguez, DO       Subjective:   Margaret Silverio is feelis well. No chest pain or sob.     Objective:   Blood pressure 138/67, pulse 60, temperature 97.7 °F (36.5 °C), temperature source Oral, resp. rate 20, height 5' 5\" (1.651 m), weight 130 lb 8 oz (59.2 kg), SpO2 100%.    Physical Exam:    General: No acute distress.   Respiratory: Clear to auscultation bilaterally. No wheezes. No rhonchi.  Cardiovascular: S1, S2. Regular rate and rhythm. No murmurs, rubs or gallops.   Abdomen: Soft, nontender, nondistended.  Positive bowel sounds. No rebound or guarding.  Neurologic: No focal neurological deficits.   Musculoskeletal: Moves all extremities.  Extremities: No edema.    Results:     Lab Results   Component Value Date    WBC 5.4 2024    HGB 7.3 (L) 2024    HCT 21.8 (L) 2024    .0 2024    CREATSERUM 2.42 (H) 2024    BUN 49 (H) 2024     2024    K 4.0 2024     2024    CO2 20.0 (L) 2024     (H) 2024    CA 8.9 2024    ALB 3.6 2024    ALKPHO 209 (H) 2024    BILT 1.1 2024    TP 6.8 2024    AST 32 2024    ALT 24 2024    PTT 35.2 2024    INR 1.25 (H) 2024    T4F 1.2 2024    TSH 10.681 (H) 2024    LIP 32 2024    ESRML 61 (H) 2024    CRP 5.10 (H) 2024    MG 2.2 2024    PHOS 4.2 2024    B12 >2,000 (H) 2024       XR CHEST AP PORTABLE  (CPT=71045)    Result Date: 2024  CONCLUSION: Moderate pulmonary edema, unchanged.  Trace bilateral pleural effusions are unchanged.  Consolidative airspace opacity within the right lower lobe is unchanged suggestive of atelectasis.    Dictated by (CST): Jose  MD Kane on 5/24/2024 at 7:05 PM     Finalized by (CST): Kane Garcia MD on 5/24/2024 at 7:09 PM                epoetin ivanna  5,000 Units Subcutaneous Once    [Held by provider] furosemide  40 mg Oral BID (Diuretic)    iron sucrose  200 mg Intravenous Daily    lidocaine-menthol  1 patch Transdermal Daily    amiodarone  200 mg Oral Daily    carvedilol  3.125 mg Oral BID with meals    folic acid  800 mcg Oral Daily    mirtazapine  7.5 mg Oral Nightly    pantoprazole  40 mg Oral BID AC    sacubitril-valsartan  1 tablet Oral BID    heparin  5,000 Units Subcutaneous Q8H BROOKLYNN       ipratropium-albuterol    acetaminophen    acetaminophen **OR** HYDROcodone-acetaminophen **OR** HYDROcodone-acetaminophen    ondansetron    Assessment and Plan:        Acute respiratory failure with hypoxia (HCC)  -Patient presenting with shortness of breath  -In ED noted to have increased work of breathing and hypoxia  -Patient was started on noninvasive ventilation with improvement of symptoms.  -Chest x-ray reviewed.  Noted moderate pulmonary edema, trace bilateral pleural effusions both of which appear unchanged from recent imaging.  Patient also with right lower lobe airspace opacity which again appeared unchanged from previous, likely atelectasis.  -BNP noted to be grossly elevated, greater than 5000  -Change lasix to PO- will be held today due to elevated creatinine  - Repeat CXR this morning   -Pulmonology and cardiology consulted, appreciate recommendations  - breo added   - shelby     Acute on chronic heart failure with severely reduced ejection fraction   - Last known EF 15%  -Continue on PO lasix- hold today   - Strict I&Os, Daily weights, Fluid restriction  - Cardiology on consult, appreciate recs.          CKD  III   -Worsening  -hold lasix  - Check Urine NA,bladder scan, UA  - Avoiding Nephrotoxic agents  - Cont to monitor     Acute Iron Deficiency anemia  - IV iron   -Without signs of acute blood loss  -Hemoglobin improved from  previous  -Continue to monitor            Quality:  DVT Prophylaxis: Heparin  CODE status: Full    MDM .high FREYA COMBS MD  05/26/24

## 2024-05-27 NOTE — PROGRESS NOTES
[facility]  part of [facility]    Progress Note      Assessment and Plan:   1.  Acute on chronic respiratory failure-patient has profound cardiomyopathy and tendency to pulmonary edema with underlying rheumatoid arthritis.  Quit smoking in 2014 likely has a component of COPD.  Breathing more comfortably.  Few basilar crackles.    Recommendations:  1.  Will repeat the chest x-ray this morning.  2.  Lasix  3.  As per cardiology, on amiodarone, carvedilol, and Entresto  4.  DuoNebs  5.  Will add Breo.    2.  DVT prophylaxis-subcutaneous heparin    Subjective:   Margaret Silverio is a(n) 79 year old female who is breathing more comfortably    Objective:   Blood pressure 131/72, pulse 60, temperature 97.3 °F (36.3 °C), temperature source Axillary, resp. rate 20, height 5' 5\" (1.651 m), weight 130 lb 8 oz (59.2 kg), SpO2 100%.    Physical Exam alert black female  HEENT examination is unremarkable with pupils equal round and reactive to light and accommodation.   Neck without adenopathy, thyromegaly, JVD nor bruit.   Lungs couple basilar crackles to auscultation and percussion.  Cardiac regular rate and rhythm no murmur.   Abdomen nontender, without hepatosplenomegaly and no mass appreciable.   Extremities without clubbing cyanosis nor edema.   Neurologic grossly intact with symmetric tone and strength and reflex.  Skin without gross abnormality     Results:        Aroldo Pacheco MD  Medical Director, Critical Care, [facility]  Medical Director, [facility]  Pager: [phone]

## 2024-05-28 ENCOUNTER — APPOINTMENT (OUTPATIENT)
Dept: GENERAL RADIOLOGY | Facility: HOSPITAL | Age: 79
End: 2024-05-28
Attending: NURSE PRACTITIONER
Payer: MEDICARE

## 2024-05-28 LAB
ANION GAP SERPL CALC-SCNC: 11 MMOL/L (ref 0–18)
ANTIBODY SCREEN: NEGATIVE
BUN BLD-MCNC: 44 MG/DL (ref 9–23)
BUN/CREAT SERPL: 18.3 (ref 10–20)
CALCIUM BLD-MCNC: 8.7 MG/DL (ref 8.7–10.4)
CHLORIDE SERPL-SCNC: 104 MMOL/L (ref 98–112)
CO2 SERPL-SCNC: 19 MMOL/L (ref 21–32)
CREAT BLD-MCNC: 2.41 MG/DL
DEPRECATED HBV CORE AB SER IA-ACNC: 667.2 NG/ML
DEPRECATED RDW RBC AUTO: 66.4 FL (ref 35.1–46.3)
EGFRCR SERPLBLD CKD-EPI 2021: 20 ML/MIN/1.73M2 (ref 60–?)
ERYTHROCYTE [DISTWIDTH] IN BLOOD BY AUTOMATED COUNT: 19.9 % (ref 11–15)
GLUCOSE BLD-MCNC: 86 MG/DL (ref 70–99)
HCT VFR BLD AUTO: 22.4 %
HCT VFR BLD AUTO: 27.5 %
HGB BLD-MCNC: 7 G/DL
HGB BLD-MCNC: 8.8 G/DL
IRON SATN MFR SERPL: 29 %
IRON SERPL-MCNC: 68 UG/DL
MCH RBC QN AUTO: 31.7 PG (ref 26–34)
MCHC RBC AUTO-ENTMCNC: 31.3 G/DL (ref 31–37)
MCV RBC AUTO: 101.4 FL
OSMOLALITY SERPL CALC.SUM OF ELEC: 288 MOSM/KG (ref 275–295)
PLATELET # BLD AUTO: 206 10(3)UL (ref 150–450)
POTASSIUM SERPL-SCNC: 3.9 MMOL/L (ref 3.5–5.1)
RBC # BLD AUTO: 2.21 X10(6)UL
RH BLOOD TYPE: POSITIVE
SODIUM SERPL-SCNC: 134 MMOL/L (ref 136–145)
TIBC SERPL-MCNC: 235 UG/DL (ref 250–425)
TRANSFERRIN SERPL-MCNC: 158 MG/DL (ref 250–380)
WBC # BLD AUTO: 5 X10(3) UL (ref 4–11)

## 2024-05-28 PROCEDURE — 99223 1ST HOSP IP/OBS HIGH 75: CPT | Performed by: INTERNAL MEDICINE

## 2024-05-28 PROCEDURE — 30233N1 TRANSFUSION OF NONAUTOLOGOUS RED BLOOD CELLS INTO PERIPHERAL VEIN, PERCUTANEOUS APPROACH: ICD-10-PCS | Performed by: HOSPITALIST

## 2024-05-28 PROCEDURE — 71045 X-RAY EXAM CHEST 1 VIEW: CPT | Performed by: NURSE PRACTITIONER

## 2024-05-28 PROCEDURE — 99232 SBSQ HOSP IP/OBS MODERATE 35: CPT | Performed by: INTERNAL MEDICINE

## 2024-05-28 PROCEDURE — 99232 SBSQ HOSP IP/OBS MODERATE 35: CPT | Performed by: PHYSICIAN ASSISTANT

## 2024-05-28 PROCEDURE — 99233 SBSQ HOSP IP/OBS HIGH 50: CPT | Performed by: HOSPITALIST

## 2024-05-28 RX ORDER — KETOROLAC TROMETHAMINE 15 MG/ML
15 INJECTION, SOLUTION INTRAMUSCULAR; INTRAVENOUS ONCE
Status: COMPLETED | OUTPATIENT
Start: 2024-05-28 | End: 2024-05-28

## 2024-05-28 RX ORDER — SODIUM CHLORIDE 9 MG/ML
INJECTION, SOLUTION INTRAVENOUS ONCE
Status: COMPLETED | OUTPATIENT
Start: 2024-05-28 | End: 2024-05-28

## 2024-05-28 RX ORDER — FUROSEMIDE 10 MG/ML
20 INJECTION INTRAMUSCULAR; INTRAVENOUS ONCE
Status: DISCONTINUED | OUTPATIENT
Start: 2024-05-28 | End: 2024-05-28

## 2024-05-28 NOTE — PROGRESS NOTES
Progress Note  Margaret Silverio Patient Status:  Inpatient    3/14/1945 MRN [id number]   Location [facility]5W Attending Regan Cruz MD   Hosp Day # 4 PCP Johnathon Rodriguez, DO     Subjective:  Pt resting comfortably in bed  Pt denies any chest pain or SOB, stated the SOB has improved. Pt stated since she has not gotten out of bed, she has not felt SOB  - on room air    Objective:  /82 (BP Location: Right arm)   Pulse 60   Temp 97.4 °F (36.3 °C) (Oral)   Resp 18   Ht 5' 5\" (1.651 m)   Wt 127 lb (57.6 kg)   SpO2 100%   BMI 21.13 kg/m²     Telemetry: AV paced, HR 60      Intake/Output:    Intake/Output Summary (Last 24 hours) at 2024 0753  Last data filed at 2024 0534  Gross per 24 hour   Intake 480 ml   Output 190 ml   Net 290 ml       Last 3 Weights   24 0534 127 lb (57.6 kg)   24 0642 130 lb 8 oz (59.2 kg)   24 0500 124 lb 12.8 oz (56.6 kg)   24 0504 123 lb 10.9 oz (56.1 kg)   24 2241 123 lb 14.4 oz (56.2 kg)   24 1724 125 lb 10.6 oz (57 kg)   24 0633 127 lb (57.6 kg)   24 0510 119 lb 8 oz (54.2 kg)   24 0500 129 lb 12.8 oz (58.9 kg)   24 1104 126 lb (57.2 kg)       Labs:  Recent Labs   Lab 24  0629 24  0954 24  0548   * 129* 86   BUN 37* 49* 44*   CREATSERUM 2.18* 2.42* 2.41*   EGFRCR 23* 20* 20*   CA 8.9 8.9 8.7   * 136 134*   K 4.7 4.0 3.9    106 104   CO2 19.0* 20.0* 19.0*     Recent Labs   Lab 24  1750 24  0348 24  0543 24  0548   RBC 2.69* 2.41* 2.26* 2.21*   HGB 8.3* 7.4* 7.3* 7.0*   HCT 27.7* 23.7* 21.8* 22.4*   .0* 98.3 96.5 101.4*   MCH 30.9 30.7 32.3 31.7   MCHC 30.0* 31.2 33.5 31.3   RDW 18.7* 17.7* 18.3* 19.9*   NEPRELIM 3.67 3.33  --   --    WBC 5.3 5.0 5.4 5.0   .0 273.0 268.0 206.0         Recent Labs   Lab 24   TROPHS 24     Lab Results   Component Value Date    INR 1.25 (H) 2024    INR 1.60 (H)  01/15/2024    INR 2.60 (H) 01/14/2024       Diagnostics:     Review of Systems   Respiratory: Negative.     Cardiovascular: Negative.          Physical Exam:    Physical Exam  Vitals reviewed.   Constitutional:       General: She is not in acute distress.  Neck:      Vascular: No JVD.   Cardiovascular:      Rate and Rhythm: Normal rate and regular rhythm.      Pulses: Normal pulses.           Radial pulses are 2+ on the right side and 2+ on the left side.        Dorsalis pedis pulses are 2+ on the right side and 2+ on the left side.      Heart sounds: S1 normal and S2 normal. Murmur heard.      Systolic murmur is present with a grade of 2/6.      No friction rub. No gallop.   Pulmonary:      Effort: Pulmonary effort is normal. No respiratory distress.      Breath sounds: Decreased air movement present. No wheezing, rhonchi or rales.   Musculoskeletal:      Cervical back: Normal range of motion and neck supple.      Right lower leg: No edema.      Left lower leg: No edema.   Skin:     General: Skin is warm and dry.   Neurological:      Mental Status: She is alert.         Medications:   [Held by provider] furosemide  40 mg Oral BID (Diuretic)    iron sucrose  200 mg Intravenous Daily    lidocaine-menthol  1 patch Transdermal Daily    amiodarone  200 mg Oral Daily    carvedilol  3.125 mg Oral BID with meals    folic acid  800 mcg Oral Daily    mirtazapine  7.5 mg Oral Nightly    pantoprazole  40 mg Oral BID AC    sacubitril-valsartan  1 tablet Oral BID    heparin  5,000 Units Subcutaneous Q8H [facility]         Assessment/Plan:  Pt was recently admitted for hypotension and dehydration, discharged off diuretics, admitted on 5/25 with fluid overload     Acute on chronic HFrEF, NICM  - echo from 2/24 with LVEF 15-20%  - s/p CD/ICD  - chest xray 5/24/24 moderate pulmonary edema   - BNP more than 5000 on admission  - pt was on bipap, now on room air   - on PO lasix  - I&O not accurately documented  - GDMT KAITLYNN RICHARDS,  lasix    Valvular dysfunction  - severe TR   - h/o bioprosthetic mitral valve replacement    Atrial flutter/Fibrillation  - Tele AV paced  - on amiodarone  - s/p watchman device    CKD 3  - baseline creatinine of 1.4-1.6  - Serum creatinine stable  - nephrology following      Plan;  - pt compensated on exam  - wt up 2lb since admission? Please check standing weight  - continue lasix, entresto, coreg and amiodarone   - need accurate documentation of intake and output  - daily weight  - yesterday lasix pm dose was held per nephrology with elevated creatinine,   - monitor renal function closely    Plan discussed with pt and RN     KODI Mclain  [facility]  5/28/2024  7:53 AM

## 2024-05-28 NOTE — PLAN OF CARE
Problem: Patient Centered Care  Goal: Patient preferences are identified and integrated in the patient's plan of care  Description: Interventions:  - What would you like us to know as we care for you? From home with   - Provide timely, complete, and accurate information to patient/family  - Incorporate patient and family knowledge, values, beliefs, and cultural backgrounds into the planning and delivery of care  - Encourage patient/family to participate in care and decision-making at the level they choose  - Honor patient and family perspectives and choices  Outcome: Progressing     Problem: Patient/Family Goals  Goal: Patient/Family Long Term Goal  Description: Patient's Long Term Goal: discharge    Interventions:  - Monitor vital signs, labs, test results  - Oxygen and respiratory therapy as needed  - Pain management  - Follow MD orders  - Administer medications per MD order  - Diagnostics per order  - Update /  inform patient on plan of care  - Discharge planning  - See additional Care Plan goals for specific interventions  Outcome: Progressing  Goal: Patient/Family Short Term Goal  Description: Patient's Short Term Goal: correct electrolytes    Interventions:   - Nephro recommendations  - Follow MD orders  - Take meds as necessary for electrolyte replacement  - See additional Care Plan goals for specific interventions  Outcome: Progressing     Problem: RESPIRATORY - ADULT  Goal: Achieves optimal ventilation and oxygenation  Description: INTERVENTIONS:  - Assess for changes in respiratory status  - Assess for changes in mentation and behavior  - Position to facilitate oxygenation and minimize respiratory effort  - Oxygen supplementation based on oxygen saturation or ABGs  - Provide Smoking Cessation handout, if applicable  - Encourage broncho-pulmonary hygiene including cough, deep breathe, Incentive Spirometry  - Assess the need for suctioning and perform as needed  - Assess and instruct to report SOB  or any respiratory difficulty  - Respiratory Therapy support as indicated  - Manage/alleviate anxiety  - Monitor for signs/symptoms of CO2 retention  Outcome: Progressing     Problem: CARDIOVASCULAR - ADULT  Goal: Maintains optimal cardiac output and hemodynamic stability  Description: INTERVENTIONS:  - Monitor vital signs, rhythm, and trends  - Monitor for bleeding, hypotension and signs of decreased cardiac output  - Evaluate effectiveness of vasoactive medications to optimize hemodynamic stability  - Monitor arterial and/or venous puncture sites for bleeding and/or hematoma  - Assess quality of pulses, skin color and temperature  - Assess for signs of decreased coronary artery perfusion - ex. Angina  - Evaluate fluid balance, assess for edema, trend weights  Outcome: Progressing  Goal: Absence of cardiac arrhythmias or at baseline  Description: INTERVENTIONS:  - Continuous cardiac monitoring, monitor vital signs, obtain 12 lead EKG if indicated  - Evaluate effectiveness of antiarrhythmic and heart rate control medications as ordered  - Initiate emergency measures for life threatening arrhythmias  - Monitor electrolytes and administer replacement therapy as ordered  Outcome: Progressing

## 2024-05-28 NOTE — PLAN OF CARE
Problem: Patient Centered Care  Goal: Patient preferences are identified and integrated in the patient's plan of care  Description: Interventions:  - What would you like us to know as we care for you? From home with   - Provide timely, complete, and accurate information to patient/family  - Incorporate patient and family knowledge, values, beliefs, and cultural backgrounds into the planning and delivery of care  - Encourage patient/family to participate in care and decision-making at the level they choose  - Honor patient and family perspectives and choices  Outcome: Progressing     Problem: Patient/Family Goals  Goal: Patient/Family Long Term Goal  Description: Patient's Long Term Goal: discharge    Interventions:  - Monitor vital signs, labs, test results  - Oxygen and respiratory therapy as needed  - Pain management  - Follow MD orders  - Administer medications per MD order  - Diagnostics per order  - Update /  inform patient on plan of care  - Discharge planning  - See additional Care Plan goals for specific interventions  Outcome: Progressing  Goal: Patient/Family Short Term Goal  Description: Patient's Short Term Goal: correct electrolytes    Interventions:   - Nephro recommendations  - Follow MD orders  - Take meds as necessary for electrolyte replacement  - See additional Care Plan goals for specific interventions  Outcome: Progressing     Problem: RESPIRATORY - ADULT  Goal: Achieves optimal ventilation and oxygenation  Description: INTERVENTIONS:  - Assess for changes in respiratory status  - Assess for changes in mentation and behavior  - Position to facilitate oxygenation and minimize respiratory effort  - Oxygen supplementation based on oxygen saturation or ABGs  - Provide Smoking Cessation handout, if applicable  - Encourage broncho-pulmonary hygiene including cough, deep breathe, Incentive Spirometry  - Assess the need for suctioning and perform as needed  - Assess and instruct to report SOB  or any respiratory difficulty  - Respiratory Therapy support as indicated  - Manage/alleviate anxiety  - Monitor for signs/symptoms of CO2 retention  Outcome: Progressing     Problem: CARDIOVASCULAR - ADULT  Goal: Maintains optimal cardiac output and hemodynamic stability  Description: INTERVENTIONS:  - Monitor vital signs, rhythm, and trends  - Monitor for bleeding, hypotension and signs of decreased cardiac output  - Evaluate effectiveness of vasoactive medications to optimize hemodynamic stability  - Monitor arterial and/or venous puncture sites for bleeding and/or hematoma  - Assess quality of pulses, skin color and temperature  - Assess for signs of decreased coronary artery perfusion - ex. Angina  - Evaluate fluid balance, assess for edema, trend weights  Outcome: Progressing  Goal: Absence of cardiac arrhythmias or at baseline  Description: INTERVENTIONS:  - Continuous cardiac monitoring, monitor vital signs, obtain 12 lead EKG if indicated  - Evaluate effectiveness of antiarrhythmic and heart rate control medications as ordered  - Initiate emergency measures for life threatening arrhythmias  - Monitor electrolytes and administer replacement therapy as ordered  Outcome: Progressing     Monitoring vital signs, stable at this moment. Pain medication provided as needed. Call light within reach, bed locked in lowest position, all fall precautions in place. All needs addressed. Frequent rounding maintained by nursing staff. Patient completing diuretic therapy per MD order, no acute changes at this time.

## 2024-05-28 NOTE — PAYOR COMM NOTE
--------------  ADMISSION REVIEW   5/24-5/28  Payor: [employer]  Subscriber #:  [id number]  Authorization Number: [id number]    Admit date: 5/24/24  Admit time:  9:02 PM       REVIEW DOCUMENTATION:  ED Provider Notes signed by Mauro Clark MD at 5/24/2024 10:08 PM    Patient Seen in: [facility] Emergency Department  History     Chief Complaint   Patient presents with    Difficulty Breathing     Stated Complaint: sob    Subjective:   HPI    Patient presents the emergency department in severe respiratory distress.  She is having difficulty answering questions due to her condition requiring continuous BiPAP.  Reportedly she became very short of breath at home and has a history of heart failure and COPD.  There is no fever.  There is no other aggravating or alleviating factors.    Positive for stated complaint: sob  Other systems are as noted in HPI.  Constitutional and vital signs reviewed.      All other systems reviewed and negative except as noted above.    Physical Exam     ED Triage Vitals [05/24/24 1724]   BP (!) 153/91   Pulse 91   Resp (!) 31   Temp 97.6 °F (36.4 °C)   Temp src Axillary   SpO2 100 %   O2 Device Bi-PAP       Current Vitals:   Vital Signs  BP: 145/75  Pulse: 82  Resp: (!) 29  Temp: 97.6 °F (36.4 °C)  Temp src: Axillary  MAP (mmHg): 96    Oxygen Therapy  SpO2: 100 %  O2 Device: Bi-PAP    Physical Exam  Vitals and nursing note reviewed.   Constitutional:       General: She is not in acute distress.     Appearance: She is well-developed.   HENT:      Head: Normocephalic.      Nose: Nose normal.      Mouth/Throat:      Mouth: Mucous membranes are moist.   Eyes:      Conjunctiva/sclera: Conjunctivae normal.   Cardiovascular:      Rate and Rhythm: Normal rate and regular rhythm.      Heart sounds: No murmur heard.  Pulmonary:      Effort: Pulmonary effort is normal. No respiratory distress.      Breath sounds: Decreased breath sounds, rhonchi and rales present.   Abdominal:       General: There is no distension.      Palpations: Abdomen is soft.      Tenderness: There is no abdominal tenderness.   Musculoskeletal:         General: No tenderness. Normal range of motion.      Cervical back: Normal range of motion and neck supple.   Skin:     General: Skin is warm and dry.      Capillary Refill: Capillary refill takes less than 2 seconds.      Findings: No rash.   Neurological:      Mental Status: She is alert.   ED Course     Labs Reviewed   COMP METABOLIC PANEL (14) - Abnormal; Notable for the following components:       Result Value    Sodium 133 (*)     Potassium 5.9 (*)     CO2 20.0 (*)     BUN 30 (*)     Creatinine 1.47 (*)     BUN/CREA Ratio 20.4 (*)     eGFR-Cr 36 (*)     AST 41 (*)     Alkaline Phosphatase 244 (*)     Globulin  3.6 (*)     All other components within normal limits   BNP (B TYPE NATRIURETIC PEPTIDE) - Abnormal; Notable for the following components:    Beta Natriuretic Peptide >5,000 (*)     All other components within normal limits   RBC MORPHOLOGY SCAN - Abnormal; Notable for the following components:    RBC Morphology See morphology below (*)     All other components within normal limits   VENOUS BLOOD GAS - Abnormal; Notable for the following components:    Venous pH 7.26 (*)     Venous Bicarbonate 18.0 (*)     Blood Gas Base Excess -7.5 (*)     Venous O2 Saturation 51.1 (*)     All other components within normal limits   POCT GLUCOSE - Abnormal; Notable for the following components:    POC Glucose  106 (*)     All other components within normal limits   CBC W/ DIFFERENTIAL - Abnormal; Notable for the following components:    RBC 2.69 (*)     HGB 8.3 (*)     HCT 27.7 (*)     .0 (*)     MCHC 30.0 (*)     RDW-SD 67.2 (*)     RDW 18.7 (*)     Lymphocyte Absolute 0.94 (*)     All other components within normal limits   TROPONIN I HIGH SENSITIVITY - Normal   CBC WITH DIFFERENTIAL WITH PLATELET    Narrative:     The following orders were created for panel order  CBC With Differential With Platelet.  Procedure                               Abnormality         Status                     ---------                               -----------         ------                     CBC W/ DIFFERENTIAL[[id number]]          Abnormal            Final result                 Please view results for these tests on the individual orders.   EKG    Rate, intervals and axes as noted on EKG Report.  Rate: 70 bpm  Rhythm: Paced rhythm  Reading: Insufficient data    Admission disposition: 5/24/2024 10:07 PM      Medical Decision Making  Differential diagnosis considered for decompensated heart failure, COPD, pneumonia, pleural effusion, electrolyte disturbance.    Problems Addressed:  Acute respiratory failure with hypoxia (HCC): acute illness or injury  Hyperkalemia: acute illness or injury  Renal insufficiency: acute illness or injury    Amount and/or Complexity of Data Reviewed  Labs: ordered. Decision-making details documented in ED Course.     Details: Laboratory studies show mild hyperkalemia and chronic renal insufficiency.  BNP significantly elevated.  Radiology: ordered and independent interpretation performed. Decision-making details documented in ED Course.     Details: Chest x-ray shows decompensated congestive heart failure  ECG/medicine tests: ordered and independent interpretation performed. Decision-making details documented in ED Course.  Discussion of management or test interpretation with external provider(s): Patient was given Lasix and Lokelma.  Will admit to the PCU as he is on BiPAP for oxygenation purposes.  Consulted cardiology, pulmonology and seen by the hospitalist in the emergency department.    Risk  Prescription drug management.  Decision regarding hospitalization.  Risk Details:   Critical Care Documentation    There were greater than 30 minutes of critical care time spent directly on this patient and this time did not include procedures but did include:    7 minutes  for documentation  10 minutes for physical exam, re-examination and discussing results with patient and family  10 minutes discussing case with admitting physicians and consultants  5 minutes interpreting labs and diagnostic testing        Critical Care  Total time providing critical care: 32 minutes    Disposition and Plan     Clinical Impression:  1. Acute respiratory failure with hypoxia (HCC)    2. Renal insufficiency    3. Hyperkalemia         Disposition:  Admit  5/24/2024 10:07 pm    Hospital Problems       Present on Admission  Date Reviewed: 5/2/2024            ICD-10-CM Noted POA    * (Principal) Acute respiratory failure with hypoxia (HCC) J96.01 5/24/2024 Unknown               5/24 H&P    CHIEF COMPLAINT: Shortness of breath     HISTORY OF PRESENT ILLNESS  This is a 79 year oldfemale who presented complaining of shortness of breath.  Patient stated symptoms started on the evening prior to admission.  Progressively worsening on the day of admission prompting visit for ED for further evaluation.  Patient denies associated cough or chest pain.  Patient denied associated lower extremity edema.  Patient denied recent sick contacts, fevers or chills.  Of note, patient was recently admitted to the hospital and discharged on 5/18.  At that time she was being treated for hypotension with hypovolemia and nonischemic cardiomyopathy.       Patient seen on noninvasive ventilation       ASSESSMENT/PLAN       Acute respiratory failure with hypoxia (HCC)  -Patient presenting with shortness of breath  -In ED noted to have increased work of breathing and hypoxia  -Patient was started on noninvasive ventilation with improvement of symptoms.  -Chest x-ray reviewed.  Noted moderate pulmonary edema, trace bilateral pleural effusions both of which appear unchanged from recent imaging.  Patient also with right lower lobe airspace opacity which again appeared unchanged from previous, likely atelectasis.  -BNP noted to be grossly  elevated, greater than 5000  -Starting IV diuresis  -Pulmonology and cardiology consulted, appreciate recommendations     Acute on chronic heart failure with severely reduced ejection fraction   - Last known EF 15%  -Starting diuresis with Lasix 40mg IV BID  - Strict I&Os, Daily weights, Fluid restriction  - Cardiology on consult, appreciate recs.      Hyperkalemia  -in setting of CKD  -Telemetry monitoring  -S/p Lasix  -Recheck  -Nephrology on consult      CKD  III   -Kidney function near baseline  - Starting IVF  - Avoiding Nephrotoxic agents  - Cont to monitor     Chronic anemia  -Without signs of acute blood loss  -Hemoglobin improved from previous  -Continue to monitor        Plan of care discussed with patient and family at bedside.  Discussed with ED physician and RN. Decision made that pt needs hospitalization for further management/monitoring.      Patient critically ill 2/2 acute respiratory failure requiring continuous NIV. Admit to ICU. Critical care time greater than 35 mins.            5/25 Cardiology    The patient is a pleasant 79-year-old -American female with history of hypertension, rheumatoid arthritis, atrial fibrillation, seizure disorder and chronic CHF who presented to the ED for shortness of breath which became progressively worse prompting her to come to the ED. The symptoms started 2 days back.  She denies any chest pain or cough.  No history of fever.  Patient was recently admitted to hospital for similar symptoms.  She has ICD placed around 1 year back for reduced EF.      ED work up  EKG: A sensed V paced rhythm  BNP: > 5000  Troponin I  24  CXR: Moderate pulmonary edema  Assessment:       HFR EF S/p ICD  Acute exacerbation  BNP > 5000  Troponin I 24     Afib S/p A sensed and V paced rhythm  Anticoagulants on hold  Planned for Watchman devise     H/o HTN     RA     GEORGE on CKD     Plan:  Lasix IV  Amiodarone po  Coreg  Entresto  Dvt prophylaxis           5/25 Pullmonary    Reason  for Consultation:   Consults  CHF exacerbation with acute respiratory failure     History provided by:patient  HPI:          Chief Complaint   Patient presents with    Difficulty Breathing      HPI     This is a very pleasant 79-year-old female with history of severe CHF HFrEF LVEF 15 % , CKD , pacemaker , severe RA with joint deformities  Presented with increased dyspnea chest x-ray showed moderate edema with very high BNP and patient received Lasix and initially placed on BiPAP patient improved significantly and now she is off BiPAP on room air  Denied cough or sputum or fever  Denied hemoptysis  No focal weakness or numbness  Denied abdominal pain or vomiting or diarrhea    Constitutional:  Positive for fatigue. Negative for fever.   Cardiovascular:  Positive for palpitations.      height is 5' 5\" (1.651 m) and weight is 123 lb 10.9 oz (56.1 kg). Her temporal temperature is 97.4 °F (36.3 °C). Her blood pressure is 118/77 and her pulse is 100. Her respiration is 24 and oxygen saturation is 100%.       omponent Value Date     WBC 5.0 05/25/2024     HGB 7.4 (L) 05/25/2024     HCT 23.7 (L) 05/25/2024     .0 05/25/2024     CREATSERUM 1.53 (H) 05/25/2024     BUN 26 (H) 05/25/2024      05/25/2024     K 5.1 05/25/2024      05/25/2024     CO2 17.0 (L) 05/25/2024     GLU 79 05/25/2024     CA 9.1 05/25/2024     ALB 3.6 05/25/2024     ALKPHO 209 (H) 05/25/2024     BILT 1.1 05/25/2024     TP 6.8 05/25/2024     AST 32 05/25/2024     ALT 24 05/25/2024       Impression:      1- acute on chronic HFrEF  LVEf 15 % with acute respiratory failure with hypoxia  CXR moderate edema with a trace basilar effusion and atelectasis  Very high BNP   No leukocytosis  Minimal history of smoking quit years ago with no clinical evidence of airway disease     Diuretics, Entresto, beta-blocker  O2 therapy and NIV if needed  Cardiology following     2-severe rheumatoid arthritis with joint deformities     3-DVT  prophylaxis  Heparin subcu               5/25 Nephrology  Reason for Consultation:   GEORGE/CKD     History of Present Illness:   Patient is a 79 year old female who was admitted to the hospital for Acute respiratory failure with hypoxia (HCC):     79 year old female with past medical history of HTN, CKD 3b, HFrEF (EF 15-20%) + grade 4 DD, s/p AICD, A.fib, s/p bioprosthetic MVR, RA, and h/o GIB/AVMs, who was recently admitted from 5/16-5/18 for hypotension and george/ckd. S/p IVF and cr improved. Was discharged without diuretics.  She presented serge to ER with sob/ CHF exacerbation. Started on IV lasix. Cr at baseline on admission.   B/l cr 1.3-1.6 mg/dl        Impression:     CKD 3 cr at b/l 1.4-1.6 mg/dl/ monitor cr with diuresis. [place] UA  HTN with CKD: acceptable. On coreg and entresto  Hyperkalemia s/p medical management . On entresto at home.  CHFrEF exacerbation, IV diuretics  Anemia, check iron panel        Plan:  Cr at baseline. Monitor cr while getting diuresed  Check iron panel             5/25 IM    Objective:   Blood pressure 119/69, pulse 91, temperature 98.2 °F (36.8 °C), temperature source Oral, resp. rate 20, height 5' 5\" (1.651 m), weight 123 lb 10.9 oz (56.1 kg), SpO2 100%.     Physical Exam:    General: No acute distress.   Respiratory: Clear to auscultation bilaterally. No wheezes. No rhonchi.  Cardiovascular: S1, S2. Regular rate and rhythm. No murmurs, rubs or gallops.   Abdomen: Soft, nontender, nondistended.  Positive bowel sounds. No rebound or guarding.  Neurologic: No focal neurological deficits.   Musculoskeletal: Moves all extremities.  Extremities: No edema.      furosemide  40 mg Intravenous BID (Diuretic)              Acute respiratory failure with hypoxia (HCC)  -Patient presenting with shortness of breath  -In ED noted to have increased work of breathing and hypoxia  -Patient was started on noninvasive ventilation with improvement of symptoms.  -Chest x-ray reviewed.  Noted moderate  pulmonary edema, trace bilateral pleural effusions both of which appear unchanged from recent imaging.  Patient also with right lower lobe airspace opacity which again appeared unchanged from previous, likely atelectasis.  -BNP noted to be grossly elevated, greater than 5000  -Continue on IV lasix   -Pulmonology and cardiology consulted, appreciate recommendations     Acute on chronic heart failure with severely reduced ejection fraction   - Last known EF 15%  -Starting diuresis with Lasix 40mg IV BID  - Strict I&Os, Daily weights, Fluid restriction  - Cardiology on consult, appreciate recs.      Hyperkalemia  -in setting of CKD  -Telemetry monitoring  -S/p Lasix  -Recheck  -Nephrology on consult      CKD  III   -Kidney function near baseline  - Starting IVF  - Avoiding Nephrotoxic agents  - Cont to monitor     Chronic anemia  -Without signs of acute blood loss  -Hemoglobin improved from previous  -Continue to monitor        5/26 Cardiology  Short of breath with ambulation to bathroom       Acute on chronic HFrEF  Nonischemic cardiomyopathy  Recently admitted for hypotension/dehydration, discharged off diuretics, now presented to ER on 5/25 with fluid overload  -recently seen in office by APN and noted to be euvolemic a few days PTA  -LVEF 15-20% on February echo   -s/p DC-ICD  -BNP >5,000 on admission, CXR with pulmonary edema  -previously on bipap,  now on room air  -diuresing with IV lasix BID, I/Os not accurately documented  -GDMT: KAITLYNN RICHARDS, previously on aldactone, stopped at last admission     Valvular dysfunction  Severe tricuspid regurgitation, hx bioprosthetic mitral valve replacement     Atrial flutter/atrial fibrillation   on tele  -amiodarone  -s/p Watchman     Plan:  -Transition to PO lasix BID due to previous dehydration on torsemide  -heart failure order set placed  -Monitor strict I/Os, daily weights, daily BMP  -Continue coreg, entresto  -continue amiodarone          5/26  Nephrology    Impression:     CKD 3 cr at b/l 1.4-1.6 mg/dl/ monitor cr with diuresis. [place] UA-cr bumped up with diuretics  HTN with CKD: acceptable. On coreg and entresto  Hyperkalemia s/p medical management . On entresto at home.  CHFrEF exacerbation, IV diuretics--> transition to PO diuretics  Anemia, can be contributing to her sob. IV iron and will give ROSA        Plan:     Bump in creatinine with diuretics  Agree with switching from IV to PO lasix  IV iron  Henry anemia  Will give rosa after iv iron done            5/26 Pulmonary    Assessment & Plan:  1- acute on chronic HFrEF  LVEf 15 % with acute respiratory failure with hypoxia  CXR moderate edema with a trace basilar effusion and atelectasis  Very high BNP   No leukocytosis  Minimal history of smoking quit years ago with no clinical evidence of airway disease     Diuretics, Entresto, beta-blocker  Cardiology following     Much better overall today on room air     2-severe rheumatoid arthritis with joint deformities     3-DVT prophylaxis  Heparin subcu          5/26 IM        Objective:   Blood pressure 109/74, pulse 102, temperature 97.2 °F (36.2 °C), temperature source Axillary, resp. rate 20, height 5' 5\" (1.651 m), weight 124 lb 12.8 oz (56.6 kg), SpO2 100%.     Physical Exam:    General: No acute distress.   Respiratory: Clear to auscultation bilaterally. No wheezes. No rhonchi.  Cardiovascular: S1, S2. Regular rate and rhythm. No murmurs, rubs or gallops.   Abdomen: Soft, nontender, nondistended.  Positive bowel sounds. No rebound or guarding.  Neurologic: No focal neurological deficits.   Musculoskeletal: Moves all extremities.  Extremities: No edema.     Results:            Lab Results   Component Value Date     WBC 5.4 05/26/2024     HGB 7.3 (L) 05/26/2024     HCT 21.8 (L) 05/26/2024     .0 05/26/2024     CREATSERUM 2.18 (H) 05/26/2024     BUN 37 (H) 05/26/2024      (L) 05/26/2024     K 4.7 05/26/2024      05/26/2024     CO2 19.0  (L) 05/26/2024      (H) 05/26/2024     CA 8.9 05/26/2024     ALB 3.6 05/26/2024          Acute respiratory failure with hypoxia (HCC)  -Patient presenting with shortness of breath  -In ED noted to have increased work of breathing and hypoxia  -Patient was started on noninvasive ventilation with improvement of symptoms.  -Chest x-ray reviewed.  Noted moderate pulmonary edema, trace bilateral pleural effusions both of which appear unchanged from recent imaging.  Patient also with right lower lobe airspace opacity which again appeared unchanged from previous, likely atelectasis.  -BNP noted to be grossly elevated, greater than 5000  -Change lasix to PO  -Pulmonology and cardiology consulted, appreciate recommendations     Acute on chronic heart failure with severely reduced ejection fraction   - Last known EF 15%  -Continue on PO lasix   - Strict I&Os, Daily weights, Fluid restriction  - Cardiology on consult, appreciate recs.      Hyperkalemia  -in setting of CKD  -Telemetry monitoring  -S/p Lasix  -Resolving   -Nephrology on consult      CKD  III   -Worsening  -change lasix to PO  - Avoiding Nephrotoxic agents  - Cont to monitor     Acute Iron Deficiency anemia  - IV iron   -Without signs of acute blood loss  -Hemoglobin improved from previous  -Continue to monitor          5/27 Pulmonary    Assessment and Plan:   1.  Acute on chronic respiratory failure-patient has profound cardiomyopathy and tendency to pulmonary edema with underlying rheumatoid arthritis.  Quit smoking in 2014 likely has a component of COPD.  Breathing more comfortably.  Few basilar crackles.     Recommendations:  1.  Will repeat the chest x-ray this morning.  2.  Lasix  3.  As per cardiology, on amiodarone, carvedilol, and Entresto  4.  DuoNebs  5.  Will add Breo.     2.  DVT prophylaxis-subcutaneous heparin      Lungs couple basilar crackles to auscultation and percussion.           5/27 Cardiology    Assessment:     Acute on chronic  HFrEF  Nonischemic cardiomyopathy  Recently admitted for hypotension/dehydration, discharged off diuretics, now presented to ER on 5/25 with fluid overload  -recently seen in office by APN and noted to be euvolemic a few days PTA  -LVEF 15-20% on February echo  -s/p DC-ICD  -BNP >5,000 on admission, CXR with mod pulmonary edema  -previously on bipap,  now on room air  -switched to po lasix yesterday.  I/Os not accurately documented  -GDMT: BB, KAITLYNN. previously on aldactone, stopped at last admission     Valvular dysfunction  Severe tricuspid regurgitation, hx bioprosthetic mitral valve replacement     Atrial flutter/atrial fibrillation   on tele  -amiodarone  -s/p Watchman     CKD 3   -baseline 1.4-1.6   -Scr bump yesterday. Switched to po lasix   -Nephrology following      Plan:     Compensated on exam . Sp02 stable on room air   Scr bump yesterday & switched to po lasix   CXR & labs pending this AM   Continue po lasix, entresto, coreg, amiodarone   Monitor accurate I/o, daily wts & renal fx closely            5/27 Nephrology    79 year old female with past medical history of HTN, CKD 3b, HFrEF (EF 15-20%) + grade 4 DD, s/p AICD, A.fib, s/p bioprosthetic MVR, RA, and h/o GIB/AVMs, who was recently admitted from 5/16-5/18 for hypotension and george/ckd. S/p IVF and cr improved. Was discharged without diuretics.  She presented serge to ER with sob/ CHF exacerbation. Started on IV lasix. Cr at baseline on admission.   B/l cr 1.3-1.6 mg/dl     Sees Dr Arvizu as outpatient     Impression:     GEORGE likely over diuresed. Hold lasix for today, bladder scan and urine sodium  CKD 3 cr at b/l 1.4-1.6 mg/dl/ monitor cr with diuresis. [place] UA-cr bumped up with diuretics  HTN with CKD: acceptable. On coreg and entresto  Hyperkalemia s/p medical management . On entresto at home.  CHFrEF exacerbation, IV diuretics--> transition to PO diuretics  Anemia, can be contributing to her sob. IV iron and will give ANH        Plan:     Bump  in creatinine with diuretics-hold for today, compensated currently  IV iron, give ANH  Henry anemia  Bl;adder scan, UA and urine sodium. Labs in am          5/27    Objective:   Blood pressure 138/67, pulse 60, temperature 97.7 °F (36.5 °C), temperature source Oral, resp. rate 20, height 5' 5\" (1.651 m), weight 130 lb 8 oz (59.2 kg), SpO2 100%.     Physical Exam:    General: No acute distress.   Respiratory: Clear to auscultation bilaterally. No wheezes. No rhonchi.  Cardiovascular: S1, S2. Regular rate and rhythm. No murmurs, rubs or gallops.   Abdomen: Soft, nontender, nondistended.  Positive bowel sounds. No rebound or guarding.  Neurologic: No focal neurological deficits.   Musculoskeletal: Moves all extremities.  Extremities: No edema.     Results:            Lab Results   Component Value Date     CREATSERUM 2.42 (H) 05/27/2024     BUN 49 (H) 05/27/2024      05/27/2024     K 4.0 05/27/2024      05/27/2024     CO2 20.0 (L) 05/27/2024      (H) 05/27/2024     CA 8.9 05/27/2024       Acute respiratory failure with hypoxia (HCC)  -Patient presenting with shortness of breath  -In ED noted to have increased work of breathing and hypoxia  -Patient was started on noninvasive ventilation with improvement of symptoms.  -Chest x-ray reviewed.  Noted moderate pulmonary edema, trace bilateral pleural effusions both of which appear unchanged from recent imaging.  Patient also with right lower lobe airspace opacity which again appeared unchanged from previous, likely atelectasis.  -BNP noted to be grossly elevated, greater than 5000  -Change lasix to PO- will be held today due to elevated creatinine  - Repeat CXR this morning   -Pulmonology and cardiology consulted, appreciate recommendations  - breo added   - lexusbs     Acute on chronic heart failure with severely reduced ejection fraction   - Last known EF 15%  -Continue on PO lasix- hold today   - Strict I&Os, Daily weights, Fluid restriction  - Cardiology  on consult, appreciate recs.          CKD  III   -Worsening  -hold lasix  - Check Urine NA,bladder scan, UA  - Avoiding Nephrotoxic agents  - Cont to monitor     Acute Iron Deficiency anemia  - IV iron   -Without signs of acute blood loss  -Hemoglobin improved from previous  -Continue to monitor                 Medications 05/22/24 05/23/24 05/24/24 05/25/24 05/26/24 05/27/24 05/28/24   epoetin ivanna (Epogen, Procrit) 5,000 Units injection  Dose: 5,000 Units  Freq: Once Route: SC  Start: 05/27/24 1130 End: 05/27/24 1219   Order specific questions:            1219 BD-Given         furosemide (Lasix) 10 mg/mL injection 40 mg  Dose: 40 mg  Freq: 2 times daily (diuretic) Route: IV  Start: 05/25/24 0900 End: 05/26/24 0922 0930 KR-Given     1735 BD-Given      0900 KL-Return to [facility]     0922-D/C'd        furosemide (Lasix) 10 mg/mL injection 40 mg  Dose: 40 mg  Freq: Once Route: IV  Start: 05/24/24 1938 End: 05/24/24 2005 2005 JJ-Given            furosemide (Lasix) tab 40 mg  Dose: 40 mg  Freq: 2 times daily (diuretic) Route: OR  Start: 05/26/24 0930        0940 KL-Given     1628 KL-Given      0832 BD-Given     1115 ZD-Held by provider [C]     (1700 CT)-Not Given      (0900 CT)-Not Given     1700 ZD        iron sucrose (Venofer) 20 mg/mL injection 200 mg  Dose: 200 mg  Freq: Daily Route: IV  Start: 05/27/24 0930 End: 05/31/24 0929   Admin Instructions:   doses less than or equal to 200mg may be given slow IV Push over 2 - 5 minutes.         0831 BD-New Bag      0818 CT-New Bag        iron sucrose (Venofer) 20 mg/mL injection 200 mg  Dose: 200 mg  Freq: Once Route: IV  Start: 05/26/24 1200 End: 05/26/24 1311   Admin Instructions:   doses less than or equal to 200mg may be given slow IV Push over 2 - 5 minutes.        1309 KL-New Bag          lidocaine-menthol 4-1 % patch 1 patch  Dose: 1 patch  Freq: Daily Route: TD  Start: 05/25/24 1345   Order specific questions:          1343 BD-Patch Applied      014  MC-Patch removed     0934 KL-Patch Applied [C]     2134 TK-Patch removed      0831 BD-Patch Applied     2031 JG-Patch removed      0818 CT-Patch Applied     2018 (Patch removed)-CT        sodium chloride 0.9% infusion  Freq: Once Route: IV  Start: 05/28/24 1315   Admin Instructions:   To standard blood administration set with integral filter. Change every 4 hours or after 2 units, whichever comes first. ALERT: NEVER mix any medication or solution with blood or blood products.  Run at KVO rate          1315        sodium zirconium cyclosilicate (Lokelma) oral packet 10 g  Dose: 10 g  Freq: Once Route: OR  Start: 05/25/24 1345 End: 05/25/24 1535   Admin Instructions:   Empty entire contents of the packet(s) into a glass with 3 tablespoons (45 mL) of water. Stir well and drink immediately; if powder remains in the glass, add water, stir and drink immediately; repeat until no powder remains. Administer other oral medications 2 hours before or 2 hours after dose.       1535 BD-Given [C]           sodium zirconium cyclosilicate (Lokelma) oral packet 10 g  Dose: 10 g  Freq: Once Route: OR  Start: 05/24/24 2054 End: 05/25/24 0053   Admin Instructions:   Empty entire contents of the packet(s) into a glass with 3 tablespoons (45 mL) of water. Stir well and drink immediately; if powder remains in the glass, add water, stir and drink immediately; repeat until no powder remains. Administer other oral medications 2 hours before or 2 hours after dose.       0053 DW-Given [C]               Medications 05/22/24 05/23/24 05/24/24 05/25/24 05/26/24 05/27/24 05/28/24    acetaminophen (Tylenol) tab 650 mg  Dose: 650 mg  Freq: Every 4 hours PRN Route: OR  PRN Reason: mild pain  Start: 05/24/24 2102   Admin Instructions:   Use PRN reason as a guide and follow range order policy            0031 DW-Given                                                                  Or   HYDROcodone-acetaminophen (Norco) 5-325 MG per tab 1 tablet  Dose: 1  tablet  Freq: Every 4 hours PRN Route: OR  PRN Reason: moderate pain  Start: 05/24/24 2102   Admin Instructions:   Use PRN reason as a guide and follow range order policy      2152 DW-Given           0930 KR-Given                                                             Or   HYDROcodone-acetaminophen (Norco) 5-325 MG per tab 2 tablet  Dose: 2 tablet  Freq: Every 4 hours PRN Route: OR  PRN Reason: severe pain  Start: 05/24/24 2102   Admin Instructions:   Use PRN reason as a guide and follow range order policy                      1348 BD-Given     2012 MC-Given      0940 KL-Given     2137 TK-Given      0835 BD-Given     1325 BD-Given     1805 BD-Given     2327 JG-Given      0621 JG-Given     1124 CT-Given          05/28/24 1243 97.6 °F (36.4 °C) 60 16 122/81 100 % -- None (Room air) -- CT   05/28/24 0817 97.4 °F (36.3 °C) 60 18 134/82 100 % -- None (Room air) -- CT   05/28/24 0534 97.1 °F (36.2 °C) 60 19 114/79 99 % 127 lb None (Room air) -- JG   05/27/24 2325 97.7 °F (36.5 °C) 60 17 133/68 100 % -- None (Room air) -- JG   05/27/24 2049 97.9 °F (36.6 °C) 60 17 127/75 100 % -- None (Room air) -- JG   05/27/24 1801 -- 60 18 120/69 99 % -- None (Room air) -- BD   05/27/24 1659 97.5 °F (36.4 °C) 60 20 95/75 97 % -- None (Room air) -- BD   05/27/24 1215 97.9 °F (36.6 °C) 60 20 133/67 98 % -- None (Room air) -- BD   05/27/24 0829 97.7 °F (36.5 °C) 60 20 138/67 100 % -- None (Room air) -- BD   05/27/24 0642 -- -- -- -- -- 130 lb 8 oz -- -- TK   05/27/24 0638 97.3 °F (36.3 °C) 60 20 131/72 100 % -- None (Room air) -- TK   05/27/24 0023 97.5 °F (36.4 °C) 60 18 120/66 98 % -- None (Room air) -- TK   05/26/24 2134 97.4 °F (36.3 °C) 60 18 133/81 100 % -- None (Room air) -- TK   05/26/24 1717 -- 97 -- -- 100 % -- None (Room air) -- MG   05/26/24 1627 97.1 °F (36.2 °C) 98 20 119/76 100 % -- None (Room air) --    05/26/24 1145 97.2 °F (36.2 °C) 102 20 109/74 100 % -- None (Room air) --    05/26/24 0922 96.9 °F (36.1 °C) 100  20 116/67 100 % -- None (Room air) -- KL   05/26/24 0500 98.7 °F (37.1 °C) 103 20 116/79 97 % 124 lb 12.8 oz None (Room air) --    05/26/24 0002 98.1 °F (36.7 °C) 96 20 112/71 100 % -- None (Room air) --    05/25/24 2008 97.8 °F (36.6 °C) 102 20 98/67 100 % -- None (Room air) --    05/25/24 1734 -- 97 18 104/67 100 % -- None (Room air) --    05/25/24 1536 97.6 °F (36.4 °C) 98 18 107/78 100 % -- None (Room air) -- BD   05/25/24 1435 -- 100 -- -- -- -- -- -- DT   05/25/24 1415 -- 99 -- -- -- -- -- -- MW   05/25/24 1143 98.2 °F (36.8 °C) 91 20 119/69 100 % -- None (Room air) -- SL   05/25/24 1133 -- 92 -- -- -- -- -- -- ST   05/25/24 0800 97.3 °F (36.3 °C) 99 23 118/79 100 % -- None (Room air) -- KR   05/25/24 0600 -- 100 24 118/77 100 % -- None (Room air) --    05/25/24 0504 -- -- -- -- -- 123 lb 10.9 oz -- --    05/25/24 0400 97.4 °F (36.3 °C) 100 21 112/77 100 % -- None (Room air) --    05/25/24 0200 -- 89 20 125/84 100 % -- None (Room air) --    05/25/24 0000 97.2 °F (36.2 °C) 91 21 127/76 100 % -- None (Room air) -- DW   05/24/24 2300 -- 90 18 126/81 100 % -- Nasal cannula 3 L/min    05/24/24 2241 -- -- -- -- -- 123 lb 14.4 oz -- -- DW   05/24/24 2200 -- 89 23 126/92 Abnormal  100 % -- Non-rebreather mask -- DW   05/24/24 2100 97.5 °F (36.4 °C) 84 51 Abnormal  136/74 100 % -- Non-rebreather mask -- DW   05/24/24 2001 -- 82 -- -- 100 % -- -- -- KK   05/24/24 1845 -- 79 29 Abnormal  145/75 100 % -- -- -- AL   05/24/24 1815 -- 78 27 Abnormal  140/86 100 % -- -- -- AL   05/24/24 1724 97.6 °F (36.4 °C) 91 31 Abnormal  153/91 Abnormal  100 % 125 lb 10.6 oz Bi-PAP -- AL

## 2024-05-28 NOTE — CDS QUERY
.How to answer this Query:     1.) DON'T CLICK COSIGN BUTTON FIRST  2.) Click \"3 dots...\" to the right of cosign button and click EDIT on the toolbar.  2.) Type an \"X\" in the bracket for the diagnosis that applies. (You may also add additional clinical details as you feel necessary to substantiate your response).   3.) Finally click \"Sign\" to complete response.  Thank You      CLINICAL DOCUMENTATION CLARIFICATION FORM  Dear Doctor Anthony,   The attending physician is required to clarify conflicting documentation in the medical record.  The following documentation is noted in the medical record:  Diagnosis 1: Acute respiratory failure with hypoxia    Documented by: Dr. Alvarez  Location: [facility]  5/27  Diagnosis 2: Acute on chronic respiratory failure   Documented by: Dr. Pacheco    Location: [facility] 5/27     PLEASE (X) DIAGNOSIS THAT APPLIES.     ( x  ) Acute Hypoxic Respiratory Failure  (   ) Other (please specify): _____________________________      Clinical Indicators: VBG pH 7.26, pCo2 43, pO2 35, Hco3  18.0. No documented chronic O2 use prior to admission    ED note: Per ems pt was unable to tolerate nrb upon arrival, increased work of breathing and hypoxia Placed on bipap    H&P A/P - Acute respiratory failure with hypoxia    Pulmonology Consult 5/25 A/P  - acute on chronic HFrEF  LVEf 15 % with acute respiratory failure with hypoxia       Risk Factor: 80 y/o with a past medical history of CHF, CKD presented with shortness of breath.     Treatment: O2 supplement, Bipap, IV Lasix, Pulmo consult, ICU    If you have any questions, please contact Clinical :  Yanni NORWOOD at [phone]     Thank You!    THIS FORM IS A PERMANENT PART OF THE MEDICAL RECORD

## 2024-05-28 NOTE — PHYSICAL THERAPY NOTE
Chart reviewed for PT treatment. Spoke with JEFFERY Ordaz who reports patient is currently receiving blood and requesting to defer for today. Will check back tomorrow

## 2024-05-28 NOTE — CONSULTS
Is this a shared or split note between Advanced Practice Provider and Physician? Yes       [facility]   Gastroenterology Consultation Note    Margaret Silverio  Patient Status:    Inpatient  Date of Admission:         5/24/2024, Hospital day #4  Attending:   Regan Cruz MD  PCP:     Johnathon Rodriguez DO    Reason for Consultation:  Anemia     History of Present Illness:  Margaret Silverio is a 79 year old female w/ PMHx of HTN, CKD 3b, HFrEF (EF 15-20%) + grade 4 DD, s/p AICD, A.fib, s/p bioprosthetic MVR, RA, and h/o GIB/AVMs who presented to ER for SOB. Upon admission BNP found to be >5000. Patient treated with lasix. Patient then found to have GEORGE due to over diuresed. Chronic anemia had been treated with IV venofer and Epogen. GI consulted for anemia and hx of AVMs. Patient hemoglobin upon arrival 8.3, today down to 7.0. Patient today feels well. Notes her shortness of breath has improved, she notes still can have dyspnea when getting up to use the bathroom. She had small brown BM today. No melena or brbpr. No nausea or vomiting. Denies hematemesis. No GERD, dysphagia or globus. No constipation or diarrhea. Weight has been stable. Appetite has been good.     Pertinent Family Hx:  - No known history of esophageal, gastric or colon cancers  - No known IBD  - No known liver pathologies    Endoscopy Hx:  Cln/EGD 1/17/2024- Dr. Rios  Impression:  Mild gastric erythema, biopsied  Single small transverse colon AVM s/p APC     Recommend:  Follow-up gastric bx   May resume diet  Weigh risks and benefits of anticoagulation given chronic GI AVM disease   Final Diagnosis:      Stomach; biopsy:  Fragments of gastric mucosa demonstrating focal minimal/mild chronic inactive gastritis with scattered eosinophils.   No evidence of intestinal metaplasia, epithelial dysplasia, or malignancy identified.   No evidence of bacterial organisms consistent with Helicobacter species seen on Giemsa stain  (appropriate control reactivity).     EGD 4/25/2023- Dr. Barrientos  Impression:  -Esophagus: Normal mucosa, small hiatal hernia.   -Stomach: Unremarkable. No source of bleeding seen.  -Duodenum: Small break in the mucosa in the duodenal bulb with active oozing. Possibly an AVM. One hemoclip was placed with cessation in oozing. This is the likely source of melena.     Recommend:  1. Will give another dose of IV Pantoprazole now and continue IV Pantoprazole 40mg BID.  2. Clear liquid diet tonight, can advance tomorrow if tolerates.  3. Pending hemoglobin tomorrow, can likely restart Eliquis.    Social Hx:  - Former tobacco use/No ETOH  - Denies illicit drug use  - Lives with: family   - Occupation: retired        History:  Past Medical History:    Acute kidney insufficiency    Anemia    Arrhythmia    CHF (congestive heart failure) (HCC)    CKD (chronic kidney disease) stage 3, GFR 30-59 ml/min (HCC)    Deep vein thrombosis (HCC)    Essential hypertension    History of blood transfusion    Rheumatoid arthritis (HCC)    Seizure disorder (HCC)     Past Surgical History:   Procedure Laterality Date    Colonoscopy N/A 01/17/2024    Dr. Rios    Colonoscopy N/A 1/17/2024    Procedure: COLONOSCOPY;  Surgeon: Zoraida Rios MD;  Location: [facility] ENDOSCOPY    Egd N/A 01/17/2024    Dr. Rios    Other surgical history  2017    Heart Surgery     Other surgical history  2020    Bilateral shoulder replacement      No family history on file.   reports that she quit smoking about 10 years ago. Her smoking use included cigarettes. She has never used smokeless tobacco. She reports that she does not drink alcohol and does not use drugs.    Allergies:  Allergies   Allergen Reactions    Lisinopril Coughing       Medications:    Current Facility-Administered Medications:     sodium chloride 0.9% infusion, , Intravenous, Once    [Held by provider] furosemide (Lasix) tab 40 mg, 40 mg, Oral, BID (Diuretic)    iron sucrose (Venofer) 20 mg/mL injection  200 mg, 200 mg, Intravenous, Daily    lidocaine-menthol 4-1 % patch 1 patch, 1 patch, Transdermal, Daily    ipratropium-albuterol (Duoneb) 0.5-2.5 (3) MG/3ML inhalation solution 3 mL, 3 mL, Nebulization, Q6H PRN    amiodarone (Pacerone) tab 200 mg, 200 mg, Oral, Daily    carvedilol (Coreg) tab 3.125 mg, 3.125 mg, Oral, BID with meals    folic acid (Folvite) tab 800 mcg, 800 mcg, Oral, Daily    mirtazapine (Remeron) tab 7.5 mg, 7.5 mg, Oral, Nightly    pantoprazole (Protonix) DR tab 40 mg, 40 mg, Oral, BID AC    [Held by provider] sacubitril-valsartan (Entresto) 49-51 MG per tab 1 tablet, 1 tablet, Oral, BID    heparin (Porcine) 5000 UNIT/ML injection 5,000 Units, 5,000 Units, Subcutaneous, Q8H [place]    acetaminophen (Tylenol Extra Strength) tab 500 mg, 500 mg, Oral, Q4H PRN    acetaminophen (Tylenol) tab 650 mg, 650 mg, Oral, Q4H PRN **OR** HYDROcodone-acetaminophen (Norco) 5-325 MG per tab 1 tablet, 1 tablet, Oral, Q4H PRN **OR** HYDROcodone-acetaminophen (Norco) 5-325 MG per tab 2 tablet, 2 tablet, Oral, Q4H PRN    ondansetron (Zofran) 4 MG/2ML injection 4 mg, 4 mg, Intravenous, Q6H PRN    Review of Systems:   CONSTITUTIONAL:  negative for fevers, chills, unintentional weight loss   EYES:  negative for diplopia or change in vision   RESPIRATORY:  negative for severe shortness of breath  CARDIOVASCULAR:  negative for crushing sub-sternal chest pain  GASTROINTESTINAL:  see HPI  GENITOURINARY:  negative for dysuria or gross hematuria  INTEGUMENT/BREAST:  SKIN:  negative for jaundice or new rash   ALLERGIC/IMMUNOLOGIC:  negative for hay fever   ENDOCRINE:  negative for cold intolerance and heat intolerance  MUSCULOSKELETAL:  negative for joint effusion/severe erythema  NEURO: negative for new loss of consciousness or dizziness   BEHAVIOR/PSYCH:  negative for psychotic behavior    Physical Exam:    Blood pressure 122/81, pulse 60, temperature 97.6 °F (36.4 °C), temperature source Oral, resp. rate 16, height 5' 5\" (1.651  m), weight 127 lb (57.6 kg), SpO2 100%. Body mass index is 21.13 kg/m².    Gen: awake, alert patient, NAD  HEENT: EOMI, the sclera appears anicteric, oropharynx clear, mucus membranes appear moist  CV: RRR  Lung: no conversational dyspnea   Abdomen: soft NTND abdomen with NABS appreciated   Back: No CVA tenderness   Skin: dry, warm, no jaundice  Ext: no LE edema is evident  Neuro: Alert and interactive  Psych: calm, cooperative    Laboratory Data:  Lab Results   Component Value Date    WBC 5.0 05/28/2024    HGB 7.0 05/28/2024    HCT 22.4 05/28/2024    .0 05/28/2024    CREATSERUM 2.41 05/28/2024    BUN 44 05/28/2024     05/28/2024    K 3.9 05/28/2024     05/28/2024    CO2 19.0 05/28/2024    GLU 86 05/28/2024    CA 8.7 05/28/2024       Imaging:  XR CHEST AP/PA (1 VIEW) (CPT=71045)    Result Date: 5/28/2024  CONCLUSION:  1. Cardiomegaly.  Prosthetic heart valve. 2. Tortuous atherosclerotic aorta.  ICD transvenous leads unchanged. 3. Prominent central vasculature with right perihilar basilar pleural parenchymal abnormality.  Minimal left basilar pleural reaction.     Dictated by (CST): Justin Stark MD on 5/28/2024 at 8:12 AM     Finalized by (CST): Justin Stark MD on 5/28/2024 at 8:19 AM           Assessment & Plan   Margaret Silverio is a 79 year old female w/ PMHx of HTN, CKD 3b, HFrEF (EF 15-20%) + grade 4 DD, s/p AICD, A.fib, s/p bioprosthetic MVR, RA, and h/o GIB/AVMs who presented to ER for SOB. Upon admission BNP found to be >5000. Patient treated with lasix. Patient then found to have GEORGE due to over diuresed. Chronic anemia had been treated with IV venofer and Epogen. GI consulted for anemia and hx of AVMs. Patient hemoglobin upon arrival 8.3, today down to 7.0. Patient today feels well. Notes her shortness of breath has improved, she notes still can have dyspnea when getting up to use the bathroom. She had small brown BM today. No melena or brbpr. No nausea or vomiting.  Denies hematemesis. No GERD, dysphagia or globus. No constipation or diarrhea. Weight has been stable. Appetite has been good.     #acute on chronic anemia   -hx of GIB/AVM in the past  -no sign of overt GIB at this time  -SOB slowly improving, however hemoglobin continues to decline  -prior EGD/cln 1/2024 notable for transverse cln AVM, no malignancy   -can consider possible repeat EGD/cln +/- VCE, however plan to continue monitory output and hemoglobin prior to endoscopic evaluation     #acute hypoxic respiratory failure  -cardiology and pulmonology following  -felt due to chronic HFrEF and severe anemia  -management per cardiology     #GEORGE   -diuretics on hold  -management per renal    Recommend:  -transfuse 1 unit PRBC  -empiric PPI BID  -trend hemoglobin, goal >7  -consider endoscopic evaluation once patient is stable from renal/cardiac/pulmonary standpoint if hemoglobin continues to decline (possible EGD/cln/VCE on 5/30)      Thank you for the opportunity to participate in the care of this patient.    Case discussed with Nedra Resendez MD and Sushma GIL.    Tiffany Reynolds PA-C  [facility]  5/28/2024

## 2024-05-28 NOTE — PROGRESS NOTES
[facility]  part of [facility]    Progress Note    Margaret Silverio Patient Status:  Inpatient    3/14/1945 MRN [id number]   Location [facility]5W Attending Regan Cruz MD   Hosp Day # 4 PCP Johnathon Rodriguez,      Subjective:   Seen and examined while resting in bed. No complaints. Denies dyspnea, cough, orthopnea, leg swelling. On room air.    Objective:   Blood pressure 134/82, pulse 60, temperature 97.4 °F (36.3 °C), temperature source Oral, resp. rate 18, height 5' 5\" (1.651 m), weight 127 lb (57.6 kg), SpO2 100%.  Physical Exam  Vitals and nursing note reviewed.   Constitutional:       General: She is awake. She is not in acute distress.     Appearance: Normal appearance. She is not ill-appearing.   HENT:      Head: Normocephalic and atraumatic.   Cardiovascular:      Rate and Rhythm: Normal rate and regular rhythm.      Heart sounds: Murmur heard.   Pulmonary:      Effort: Pulmonary effort is normal. No respiratory distress.      Breath sounds: Rales (bibasilar) present. No wheezing or rhonchi.   Musculoskeletal:      Cervical back: Normal range of motion and neck supple.      Right lower leg: No edema.      Left lower leg: No edema.   Skin:     General: Skin is warm and dry.   Neurological:      General: No focal deficit present.      Mental Status: She is alert and oriented to person, place, and time.   Psychiatric:         Mood and Affect: Mood normal.         Behavior: Behavior is cooperative.       Results:   Lab Results   Component Value Date    WBC 5.0 2024    HGB 7.0 (L) 2024    HCT 22.4 (L) 2024    .0 2024    CREATSERUM 2.41 (H) 2024    BUN 44 (H) 2024     (L) 2024    K 3.9 2024     2024    CO2 19.0 (L) 2024    GLU 86 2024    CA 8.7 2024    ALB 3.6 2024    ALKPHO 209 (H) 2024    BILT 1.1 2024    TP 6.8 2024    AST 32 2024    ALT 24  05/25/2024    PTT 35.2 02/09/2024    INR 1.25 (H) 02/09/2024    T4F 1.2 02/03/2024    TSH 10.681 (H) 02/03/2024    LIP 32 01/13/2024    ESRML 61 (H) 04/23/2024    CRP 5.10 (H) 04/23/2024    MG 2.2 05/25/2024    PHOS 4.2 05/26/2024    TROPHS 24 05/24/2024    B12 >2,000 (H) 02/03/2024       XR CHEST AP/PA (1 VIEW) (CPT=71045)    Result Date: 5/28/2024  CONCLUSION:  1. Cardiomegaly.  Prosthetic heart valve. 2. Tortuous atherosclerotic aorta.  ICD transvenous leads unchanged. 3. Prominent central vasculature with right perihilar basilar pleural parenchymal abnormality.  Minimal left basilar pleural reaction.     Dictated by (CST): Justin Stark MD on 5/28/2024 at 8:12 AM     Finalized by (CST): Justin Stark MD on 5/28/2024 at 8:19 AM               Assessment & Plan:   Acute hypoxemic respiratory failure  Due to acute on chronic HFrEF and severe anemia  Possible underlying COPD with prior tobacco history  Also has underlying RA  Chest x-ray with pulmonary edema and trace basilar effusions/atelectasis  Chest x-ray 5/28 with improvement  Clinically improved - initially required BiPAP and now on room air  Plan:  -Management of CHF as per cardiology  -DuoNebs as needed    Acute on chronic HFrEF  Echo EF 15-20%, grade 4 diastolic dysfunction, wide-open tricuspid regurgitation  BNP markedly elevated  ?Inaccurate I/Os(+1.7 L and low urine output)  Plan:  -I/Os  -As per cardiology    Acute on chronic kidney disease  Rise in creatinine with diuresis  Plan:  -Diuretics on hold  -As per renal    Acute on chronic anemia  Plan:  -IV Venofer    DVT prophylaxis: Subcutaneous heparin    Jigar Lawrence PA-C  Pulmonary Medicine  5/28/2024

## 2024-05-28 NOTE — PROGRESS NOTES
[facility]  part of [facility]    Progress Note    Margaret Silverio Patient Status:  Inpatient    3/14/1945 MRN [id number]   Location [facility]5W Attending Raiza Alvarez MD   Hosp Day # 4 PCP Johnathon Rodriguez, DO       Subjective:   Margaret Silverio is a(n) 79 year old female who I am seeing for ckd/vol overload    Feels much better  Breathing is better  No new issues  Denies any bleeding    Objective:   /81 (BP Location: Right arm)   Pulse 60   Temp 97.6 °F (36.4 °C) (Oral)   Resp 16   Ht 5' 5\" (1.651 m)   Wt 127 lb (57.6 kg)   SpO2 100%   BMI 21.13 kg/m²      Intake/Output Summary (Last 24 hours) at 2024 1304  Last data filed at 2024 0900  Gross per 24 hour   Intake 474 ml   Output 190 ml   Net 284 ml     Wt Readings from Last 1 Encounters:   24 127 lb (57.6 kg)       Exam  Vital signs: Blood pressure 122/81, pulse 60, temperature 97.6 °F (36.4 °C), temperature source Oral, resp. rate 16, height 5' 5\" (1.651 m), weight 127 lb (57.6 kg), SpO2 100%.    General: No acute distress. Alert and oriented x 3.  HEENT: Moist mucous membranes. EOMI. PERRLA  Neck:  No JVD.   Respiratory: Clear to auscultation bilaterally.    Cardiovascular: S1, S2.  Regular rate and rhythm.   Abdomen: Soft, nontender, nondistended.    Neurologic: No focal neurological deficits.  Musculoskeletal: Full range of motion of all extremities.  No swelling noted.  Access:    Results:     Recent Labs   Lab 24  1750 24  0348 24  0543 24  0548   RBC 2.69* 2.41* 2.26* 2.21*   HGB 8.3* 7.4* 7.3* 7.0*   HCT 27.7* 23.7* 21.8* 22.4*   .0* 98.3 96.5 101.4*   MCH 30.9 30.7 32.3 31.7   MCHC 30.0* 31.2 33.5 31.3   RDW 18.7* 17.7* 18.3* 19.9*   NEPRELIM 3.67 3.33  --   --    WBC 5.3 5.0 5.4 5.0   .0 273.0 268.0 206.0         Recent Labs   Lab 24  0629 24  0954 24  0548   * 129* 86   BUN 37* 49* 44*   CREATSERUM 2.18* 2.42* 2.41*    CA 8.9 8.9 8.7   * 136 134*   K 4.7 4.0 3.9    106 104   CO2 19.0* 20.0* 19.0*          XR CHEST AP/PA (1 VIEW) (CPT=71045)    Result Date: 5/28/2024  CONCLUSION:  1. Cardiomegaly.  Prosthetic heart valve. 2. Tortuous atherosclerotic aorta.  ICD transvenous leads unchanged. 3. Prominent central vasculature with right perihilar basilar pleural parenchymal abnormality.  Minimal left basilar pleural reaction.     Dictated by (CST): Justin Stark MD on 5/28/2024 at 8:12 AM     Finalized by (CST): Justin Stark MD on 5/28/2024 at 8:19 AM           Assessment and Plan:     79 year old female with past medical history of HTN, CKD 3b, HFrEF (EF 15-20%) + grade 4 DD, s/p AICD, A.fib, s/p bioprosthetic MVR, RA, and h/o GIB/AVMs, who was recently admitted from 5/16-5/18 for hypotension and george/ckd. S/p IVF and cr improved. Was discharged without diuretics.  She presented serge to ER with sob/ CHF exacerbation. Started on IV lasix. Cr at baseline on admission.   B/l cr 1.3-1.6 mg/dl     Sees Dr Arvizu as outpatient    Impression:    GEORGE likely over diuresed. Hold lasix for today, bladder scan and urine sodium  CKD 3 cr at b/l 1.4-1.6 mg/dl/ monitor cr with diuresis. [employer] UA-cr bumped up with diuretics  HTN with CKD: acceptable. On coreg and entresto  Hyperkalemia s/p medical management . On entresto at home.  CHFrEF exacerbation, IV diuretics--> transition to PO diuretics  Anemia, can be contributing to her sob. IV iron and will give ROSA      Plan:    Bump in creatinine with diuretics-cr stable after holding diuretics  IV iron,+ rosa given   Prbc ?   Henry anemia  Labs in am      Thank you very much for allowing me to participate in the care of your patient.  If you have any questions, please do not hesitate to contact me.     Keerthi Naqvi MD

## 2024-05-28 NOTE — CM/SW NOTE
05/28/24 1300   ALEXEY/BRADEN Referral Data   Referral Source    Reason for Referral Discharge planning;Readmission   Informant Patient;Clinical Staff Member;EMR   Readmission Assessment   Factors that patient feels contributed to this readmission Acute/Chronic Clinical Presentation   Pt's living situation prior to admission? Home with family   Pt's level of independence at discharge? No assist/independent (minimal)   Pt. received education on diagnoses at time of discharge? Yes   Did you know who and how to call someone if you felt worse? Yes   Did any new symptoms or issues develop after you were discharged? Yes   ----Post D/C symptoms: Symptoms/issue related to previous hospitalization No   Did you understand your discharge instructions? Yes   Were medications taken as indicated on discharge instructions? Yes   Was patient a candidate for: Home Health   ----Home Health Recommendation: Recommended, arranged   Was full assessment completed by BRADEN/ALEXEY on prior admission? Yes   Was the recommended discharge plan achieved? Yes   Was pt. discharged w/out services? No   Medical Hx   Does patient have an established PCP? Yes  (Johnathon Rodriguez)   Patient Info   Patient's Current Mental Status at Time of Assessment Alert;Oriented   Patient's Home Environment House   Patient lives with Spouse/Significant other   Patient Status Prior to Admission   Independent with ADLs and Mobility Yes   Discharge Needs   Anticipated D/C needs Home health care   Choice of Post-Acute Provider   Informed patient of right to choose their preferred provider Yes     Pt discussed during nursing rounds. Dx respiratory failure 2/2 COPD exacerbation on RA. Home w/spouse, independent w/o device at baseline. Anticipated therapy need: Home with Home Healthcare. Pt recently discharge from [facility] for non-compliance. Pt requesting HH at NH. HH referrals sent in KASIE, F2F entered. List of accepting agencies will be needed for  choice.    Plan: Home w/spouse with HH pending agency choice and medical clearance.    / to remain available for support and/or discharge planning.     Ayush Silverio, BSN    [phone]

## 2024-05-28 NOTE — PROGRESS NOTES
[facility]  part of [facility]    Progress Note    Margaret Silverio Patient Status:  Inpatient    3/14/1945 MRN [id number]   Location [facility]5W Attending Regan Cruz MD   Hosp Day # 4 PCP Johnathon Rodriguez DO     Chief Complaint:   Chief Complaint   Patient presents with    Difficulty Breathing       Subjective:   Margaret Silverio is feeling better. No SOB no N/V no F/C no CP. Off o2. Eating well.       Objective:   Objective:    Blood pressure 134/82, pulse 60, temperature 97.4 °F (36.3 °C), temperature source Oral, resp. rate 18, height 5' 5\" (1.651 m), weight 127 lb (57.6 kg), SpO2 100%.    Physical Exam:    General: No acute distress.   Respiratory: Clear to auscultation bilaterally. No wheezes. No rhonchi.  Cardiovascular: S1, S2. Regular rate and rhythm. No murmurs, rubs or gallops.   Abdomen: Soft, nontender, nondistended.  Positive bowel sounds. No rebound or guarding.  Neurologic: No focal neurological deficits.   Musculoskeletal: Moves all extremities.  Extremities: No edema.      Results:   Results:    Labs:  Recent Labs   Lab 24  1750 24  0348 24  0543 24  0548   WBC 5.3 5.0 5.4 5.0   HGB 8.3* 7.4* 7.3* 7.0*   .0* 98.3 96.5 101.4*   .0 273.0 268.0 206.0       Recent Labs   Lab 24  1936 24  0348 24  0629 24  0954 24  0548   GLU 91 79 100* 129* 86   BUN 30* 26* 37* 49* 44*   CREATSERUM 1.47* 1.53* 2.18* 2.42* 2.41*   CA 9.4 9.1 8.9 8.9 8.7   ALB 4.2 3.6 3.6  --   --    * 136 133* 136 134*   K 5.9* 5.1 4.7 4.0 3.9    107 104 106 104   CO2 20.0* 17.0* 19.0* 20.0* 19.0*   ALKPHO 244* 209*  --   --   --    AST 41* 32  --   --   --    ALT 29 24  --   --   --    BILT 1.0 1.1  --   --   --    TP 7.8 6.8  --   --   --        Estimated Creatinine Clearance: 17.3 mL/min (A) (based on SCr of 2.41 mg/dL (H)).    No results for input(s): \"PTP\", \"INR\" in the last 168 hours.         Culture:  No  results found for this visit on 05/24/24.    Cardiac  No results for input(s): \"TROP\", \"PBNP\" in the last 168 hours.      Imaging: Imaging data reviewed in [facility].  XR CHEST AP/PA (1 VIEW) (CPT=71045)    Result Date: 5/28/2024  CONCLUSION:  1. Cardiomegaly.  Prosthetic heart valve. 2. Tortuous atherosclerotic aorta.  ICD transvenous leads unchanged. 3. Prominent central vasculature with right perihilar basilar pleural parenchymal abnormality.  Minimal left basilar pleural reaction.     Dictated by (CST): Justin Stark MD on 5/28/2024 at 8:12 AM     Finalized by (CST): Justin Stark MD on 5/28/2024 at 8:19 AM           Medications:    [Held by provider] furosemide  40 mg Oral BID (Diuretic)    iron sucrose  200 mg Intravenous Daily    lidocaine-menthol  1 patch Transdermal Daily    amiodarone  200 mg Oral Daily    carvedilol  3.125 mg Oral BID with meals    folic acid  800 mcg Oral Daily    mirtazapine  7.5 mg Oral Nightly    pantoprazole  40 mg Oral BID AC    sacubitril-valsartan  1 tablet Oral BID    heparin  5,000 Units Subcutaneous Q8H BROOKLYNN         Assessment and Plan:   Assessment & Plan:        Acute hypoxic respiratory failure   Secondary to Pulmonary edema from Acute CHF and anemia.   Initially on NIPPV now on RA.   CXR pulmonary edema. BNP > 5000  Was diuresed with lasix however developed GEORGE. Now lasix on hold   Pulmonary and card on consult.   Dana Blackman   Acute on chronic HFrEF, NICM   ECHO from 2/24 LVEF 15-20%   S/p AICD   GDMT: coreg, hold entresto   Was diuresed now lasix on hold with GEORGE.   GEORGE on CKD III   Baseline creat 1.4-1.6   UA bland   Likely secondary to overdiuresis. Hold lasix.   Renal on consult.   Hold entresto   Urine sodium 17   Rpt labs in AM.   Acute anemia  Baseline Hb 10-12 per EMR back in Feb  Check iron panel, ferritin  FOB.   Had Colonoscopy Jan 2024 found to have AVM's   No signs of blood loss at this time. Though with progressive anemia may need GI eval.    GI consult  Transfuse 1 unit PRBC   >55min spent, >50% spent counseling and coordinating care in the form of educating pt/family and d/w consultants and staff. Most of the time spent discussing the above plan.        Plan of care discussed with patient or family at bedside.    Regan Cruz MD  Hospitalist          Supplementary Documentation:     Quality:  DVT Prophylaxis: SCD hold heparin   CODE status: Full   Dispo: per clinical course           Estimated date of discharge: TBD  Discharge is dependent on: clinical stability  At this point Ms. Silverio is expected to be discharge to: home         **Certification      PHYSICIAN Certification of Need for Inpatient Hospitalization - Initial Certification    Patient will require inpatient services that will reasonably be expected to span two midnight's based on the clinical documentation in H+P.   Based on patients current state of illness, I anticipate that, after discharge, patient will require TBD.

## 2024-05-29 LAB
ANION GAP SERPL CALC-SCNC: 10 MMOL/L (ref 0–18)
BLOOD TYPE BARCODE: 9500
BUN BLD-MCNC: 49 MG/DL (ref 9–23)
BUN/CREAT SERPL: 20.5 (ref 10–20)
CALCIUM BLD-MCNC: 9.1 MG/DL (ref 8.7–10.4)
CHLORIDE SERPL-SCNC: 104 MMOL/L (ref 98–112)
CO2 SERPL-SCNC: 21 MMOL/L (ref 21–32)
CREAT BLD-MCNC: 2.39 MG/DL
DEPRECATED RDW RBC AUTO: 66.5 FL (ref 35.1–46.3)
EGFRCR SERPLBLD CKD-EPI 2021: 20 ML/MIN/1.73M2 (ref 60–?)
ERYTHROCYTE [DISTWIDTH] IN BLOOD BY AUTOMATED COUNT: 21 % (ref 11–15)
GLUCOSE BLD-MCNC: 94 MG/DL (ref 70–99)
HCT VFR BLD AUTO: 26.1 %
HGB BLD-MCNC: 8.4 G/DL
MCH RBC QN AUTO: 31.3 PG (ref 26–34)
MCHC RBC AUTO-ENTMCNC: 32.2 G/DL (ref 31–37)
MCV RBC AUTO: 97.4 FL
OSMOLALITY SERPL CALC.SUM OF ELEC: 293 MOSM/KG (ref 275–295)
PLATELET # BLD AUTO: 281 10(3)UL (ref 150–450)
POTASSIUM SERPL-SCNC: 4 MMOL/L (ref 3.5–5.1)
RBC # BLD AUTO: 2.68 X10(6)UL
SODIUM SERPL-SCNC: 135 MMOL/L (ref 136–145)
UNIT VOLUME: 350 ML
WBC # BLD AUTO: 7.9 X10(3) UL (ref 4–11)

## 2024-05-29 PROCEDURE — 99232 SBSQ HOSP IP/OBS MODERATE 35: CPT | Performed by: REGISTERED NURSE

## 2024-05-29 PROCEDURE — 99232 SBSQ HOSP IP/OBS MODERATE 35: CPT | Performed by: INTERNAL MEDICINE

## 2024-05-29 PROCEDURE — 99233 SBSQ HOSP IP/OBS HIGH 50: CPT | Performed by: HOSPITALIST

## 2024-05-29 PROCEDURE — 99232 SBSQ HOSP IP/OBS MODERATE 35: CPT | Performed by: PHYSICIAN ASSISTANT

## 2024-05-29 RX ORDER — POLYETHYLENE GLYCOL 3350 17 G/17G
17 POWDER, FOR SOLUTION ORAL DAILY
Status: DISCONTINUED | OUTPATIENT
Start: 2024-05-29 | End: 2024-06-03

## 2024-05-29 RX ORDER — SENNOSIDES 8.6 MG
8.6 TABLET ORAL 2 TIMES DAILY
Status: DISCONTINUED | OUTPATIENT
Start: 2024-05-29 | End: 2024-06-03

## 2024-05-29 RX ORDER — MILRINONE LACTATE 0.2 MG/ML
0.12 INJECTION, SOLUTION INTRAVENOUS CONTINUOUS
Status: DISCONTINUED | OUTPATIENT
Start: 2024-05-29 | End: 2024-06-01

## 2024-05-29 RX ORDER — FUROSEMIDE 10 MG/ML
20 INJECTION INTRAMUSCULAR; INTRAVENOUS ONCE
Status: DISCONTINUED | OUTPATIENT
Start: 2024-05-29 | End: 2024-05-30 | Stop reason: ALTCHOICE

## 2024-05-29 RX ORDER — GABAPENTIN 100 MG/1
100 CAPSULE ORAL 3 TIMES DAILY
Status: DISCONTINUED | OUTPATIENT
Start: 2024-05-29 | End: 2024-05-30

## 2024-05-29 RX ORDER — CYCLOBENZAPRINE HCL 5 MG
5 TABLET ORAL 3 TIMES DAILY PRN
Status: DISCONTINUED | OUTPATIENT
Start: 2024-05-29 | End: 2024-06-03

## 2024-05-29 NOTE — PROGRESS NOTES
Progress Note  Margaret Silverio Patient Status:  Inpatient    3/14/1945 MRN [id number]   Location [facility]5W Attending Regan Cruz MD   Hosp Day # 5 PCP Johnathon Rodriguez, DO     Subjective:  Pt resting comfortably. Pt denies any chest pain or SOB. Pt stated she dosnt feel any SOB while resting. Has not gotten out of bed yesterday.     Objective:  /75 (BP Location: Right arm)   Pulse 61   Temp 96.7 °F (35.9 °C) (Oral)   Resp 19   Ht 5' 5\" (1.651 m)   Wt 128 lb 8 oz (58.3 kg)   SpO2 99%   BMI 21.38 kg/m²     Telemetry: AV paced, HR 60      Intake/Output:    Intake/Output Summary (Last 24 hours) at 2024 0902  Last data filed at 2024 0631  Gross per 24 hour   Intake 893 ml   Output 200 ml   Net 693 ml       Last 3 Weights   24 0631 128 lb 8 oz (58.3 kg)   24 0534 127 lb (57.6 kg)   24 0642 130 lb 8 oz (59.2 kg)   24 0500 124 lb 12.8 oz (56.6 kg)   24 0504 123 lb 10.9 oz (56.1 kg)   24 2241 123 lb 14.4 oz (56.2 kg)   24 1724 125 lb 10.6 oz (57 kg)   24 0633 127 lb (57.6 kg)   24 0510 119 lb 8 oz (54.2 kg)   24 0500 129 lb 12.8 oz (58.9 kg)   24 1104 126 lb (57.2 kg)       Labs:  Recent Labs   Lab 24  0954 24  0548 24  0539   * 86 94   BUN 49* 44* 49*   CREATSERUM 2.42* 2.41* 2.39*   EGFRCR 20* 20* 20*   CA 8.9 8.7 9.1    134* 135*   K 4.0 3.9 4.0    104 104   CO2 20.0* 19.0* 21.0     Recent Labs   Lab 24  1750 24  0348 24  0543 24  0548 24  1921 24  0539   RBC 2.69* 2.41* 2.26* 2.21*  --  2.68*   HGB 8.3* 7.4* 7.3* 7.0* 8.8* 8.4*   HCT 27.7* 23.7* 21.8* 22.4* 27.5* 26.1*   .0* 98.3 96.5 101.4*  --  97.4   MCH 30.9 30.7 32.3 31.7  --  31.3   MCHC 30.0* 31.2 33.5 31.3  --  32.2   RDW 18.7* 17.7* 18.3* 19.9*  --  21.0*   NEPRELIM 3.67 3.33  --   --   --   --    WBC 5.3 5.0 5.4 5.0  --  7.9   .0 273.0 268.0 206.0  --  281.0          Recent Labs   Lab 05/24/24 1936   [facility] 24     Lab Results   Component Value Date    INR 1.25 (H) 02/09/2024    INR 1.60 (H) 01/15/2024    INR 2.60 (H) 01/14/2024       Diagnostics:     TTE2/10/24  Conclusions:     1. Left ventricle: The cavity size was normal. Wall thickness was normal.      Systolic function was normal. The estimated ejection fraction was 15-20%,      by biplane method of disks. Doppler parameters are consistent with an      irreversible restrictive pattern, indicative of decreased left      ventricular diastolic compliance and/or increased left atrial pressure -      grade 4 diastolic dysfunction.   2. Right ventricle: The cavity size was markedly increased. Pacer wire noted      in the right ventricle. Although right ventricular systolic pressure was      not increased relative to right atrial pressure, the right atrial      pressure would appear to be markedly elevated as a result of wide-open      tricuspid regurgitation. Absolute RV systolic pressure was, therefore, at      least moderately increased.   3. Ventricular septum: Septal motion was dyssynergic.   4. Left atrium: The atrium was increased in size. Echodensity seen in LA.   5. Right atrium: The atrium was markedly dilated. Pacer wire noted in right      atrium.   6. Mitral valve: A bioprosthesis is present and functioning normally.   7. Tricuspid valve: There was malcoaptation of the valve leaflets. There was      wide-open regurgitation.   *       Review of Systems   Respiratory: Negative.     Cardiovascular: Negative.        Physical Exam:    Physical Exam  Vitals reviewed.   Constitutional:       General: She is not in acute distress.  Neck:      Vascular: No JVD.   Cardiovascular:      Rate and Rhythm: Normal rate and regular rhythm.      Pulses: Normal pulses.           Radial pulses are 2+ on the right side and 2+ on the left side.        Dorsalis pedis pulses are 2+ on the right side and 2+ on the left side.       Heart sounds: S1 normal and S2 normal. Murmur heard.      Systolic murmur is present with a grade of 2/6.      No friction rub. No gallop.   Pulmonary:      Effort: Pulmonary effort is normal. No respiratory distress.      Breath sounds: Decreased air movement present. No wheezing, rhonchi or rales.      Comments: Fine crackles on bases  Musculoskeletal:      Cervical back: Normal range of motion and neck supple.      Right lower leg: No edema.      Left lower leg: No edema.   Skin:     General: Skin is warm and dry.   Neurological:      Mental Status: She is alert.         Medications:   lidocaine-menthol  1 patch Transdermal Once    [Held by provider] furosemide  40 mg Oral BID (Diuretic)    iron sucrose  200 mg Intravenous Daily    lidocaine-menthol  1 patch Transdermal Daily    amiodarone  200 mg Oral Daily    carvedilol  3.125 mg Oral BID with meals    folic acid  800 mcg Oral Daily    mirtazapine  7.5 mg Oral Nightly    pantoprazole  40 mg Oral BID AC    [Held by provider] sacubitril-valsartan  1 tablet Oral BID         Assessment/Plan:    Pt was recently admitted for hypotension and dehydration, discharged off diuretics, admitted on 5/25 with fluid overload      Acute on chronic HFrEF, NICM  - echo from 2/24 with LVEF 15-20%  - s/p CD/ICD  - chest xray 5/24/24 moderate pulmonary edema   - BNP more than 5000 on admission  - pt was on bipap, now on room air   - on PO lasix  - I&O not accurately documented  - GDMT BB, (KAITLYNN, lasix on hold due to elevated creatinine)       Valvular dysfunction  - severe TR   - h/o bioprosthetic mitral valve replacement     Atrial flutter/Fibrillation  - Tele AV paced  - on amiodarone  - s/p watchman device     CKD 3  - baseline creatinine of 1.4-1.6  - Serum creatinine stable at 2.3  - nephrology following        Plan;  - pt has bilateral crackles on bases, will recommend giving lasix today, will discuss with nephrology  - wt up 3lb since admission, minimal urine output  -  continue coreg and amiodarone   - entresto and lasix on hold with elevated creatinine   - strict intake and output  - daily weight  - monitor renal function closely     Plan discussed with pt and RN       KODI Mclain  [facility]  5/29/2024  9:02 AM    Addendum: 1028  Discussed with Dr Naqvi, Nephrology, plan to give lasix 20mg today and monitor output closely'    CARDIOLOGY ATTENDING    Note above was reviewed and the patient examined independently.    If the intake and output measurements entered in the chart are accurate, the patient is oliguric.  Positive fluid balance this admission.    Severe LV dysfunction.  Probably a component of cardiorenal syndrome.    Entresto on hold with increased creatinine and forced diuresis.    Start milrinone drip to hopefully renal perfusion.    If blood pressure is adequate on milrinone, will consider adding hydralazine/isosorbide for LV dysfunction

## 2024-05-29 NOTE — PROGRESS NOTES
[facility]  part of [facility]    Progress Note    Margaret Silverio Patient Status:  Inpatient    3/14/1945 MRN [id number]   Location [facility] Attending Regan Cruz MD   Hosp Day # 5 PCP Johnathon Rodriguez,      Subjective:   Seen and examined while resting in bed. No complaints. Denies dyspnea, cough. Denies orthopnea and leg swelling. No bowel movement in several days. On room air.    Objective:   Blood pressure 134/75, pulse 61, temperature 96.7 °F (35.9 °C), temperature source Oral, resp. rate 19, height 5' 5\" (1.651 m), weight 128 lb 8 oz (58.3 kg), SpO2 99%.  Physical Exam  Vitals and nursing note reviewed.   Constitutional:       General: She is awake. She is not in acute distress.     Appearance: Normal appearance. She is not ill-appearing.   HENT:      Head: Normocephalic and atraumatic.   Cardiovascular:      Rate and Rhythm: Normal rate and regular rhythm.      Heart sounds: Murmur heard.   Pulmonary:      Effort: Pulmonary effort is normal. No respiratory distress.      Breath sounds: Rales (bibasilar) present. No wheezing or rhonchi.   Musculoskeletal:      Cervical back: Normal range of motion and neck supple.      Right lower leg: No edema.      Left lower leg: No edema.   Skin:     General: Skin is warm and dry.   Neurological:      General: No focal deficit present.      Mental Status: She is alert and oriented to person, place, and time.   Psychiatric:         Mood and Affect: Mood normal.         Behavior: Behavior is cooperative.       Results:   Lab Results   Component Value Date    WBC 7.9 2024    HGB 8.4 (L) 2024    HCT 26.1 (L) 2024    .0 2024    CREATSERUM 2.39 (H) 2024    BUN 49 (H) 2024     (L) 2024    K 4.0 2024     2024    CO2 21.0 2024    GLU 94 2024    CA 9.1 2024    ALB 3.6 2024    ALKPHO 209 (H) 2024    BILT 1.1 2024    TP 6.8  05/25/2024    AST 32 05/25/2024    ALT 24 05/25/2024    PTT 35.2 02/09/2024    INR 1.25 (H) 02/09/2024    T4F 1.2 02/03/2024    TSH 10.681 (H) 02/03/2024    LIP 32 01/13/2024    ESRML 61 (H) 04/23/2024    CRP 5.10 (H) 04/23/2024    MG 2.2 05/25/2024    PHOS 4.2 05/26/2024    TROPHS 24 05/24/2024    B12 >2,000 (H) 02/03/2024       XR CHEST AP/PA (1 VIEW) (CPT=71045)    Result Date: 5/28/2024  CONCLUSION:  1. Cardiomegaly.  Prosthetic heart valve. 2. Tortuous atherosclerotic aorta.  ICD transvenous leads unchanged. 3. Prominent central vasculature with right perihilar basilar pleural parenchymal abnormality.  Minimal left basilar pleural reaction.     Dictated by (CST): Justin Stark MD on 5/28/2024 at 8:12 AM     Finalized by (CST): Justin Stark MD on 5/28/2024 at 8:19 AM               Assessment & Plan:   Acute hypoxemic respiratory failure  Due to acute on chronic HFrEF and severe anemia  Possible underlying COPD with prior tobacco history  Also has underlying RA  Chest x-ray with pulmonary edema and trace basilar effusions/atelectasis  Chest x-ray 5/28 with improvement  Clinically improved - initially required BiPAP and now on room air  Plan:  -Management of CHF as per cardiology  -DuoNebs as needed    Acute on chronic HFrEF  Echo EF 15-20%, grade 4 diastolic dysfunction, wide-open tricuspid regurgitation  BNP markedly elevated  ?Inaccurate I/Os (+2.4 L and low urine output)  Diuretics on hold due to worsening renal function  Plan:  -I/Os  -As per cardiology    Acute on chronic kidney disease  Rise in creatinine with diuresis  Plan:  -Diuretics on hold  -As per renal    Acute on chronic anemia  Seen by GI - h/o AVMs  S/p 1 unit pRBC 5/28  Plan:  -IV Venofer  -PPI  -Okay to proceed with scopes from pulmonary perspective if needed  -As per GI    DVT prophylaxis: SCDs only in patient with severe anemia.    D/w RN.    Jigar Kilander, PA-C  Pulmonary Medicine  5/29/2024

## 2024-05-29 NOTE — PLAN OF CARE
Problem: Patient Centered Care  Goal: Patient preferences are identified and integrated in the patient's plan of care  Description: Interventions:  - What would you like us to know as we care for you? From home with   - Provide timely, complete, and accurate information to patient/family  - Incorporate patient and family knowledge, values, beliefs, and cultural backgrounds into the planning and delivery of care  - Encourage patient/family to participate in care and decision-making at the level they choose  - Honor patient and family perspectives and choices  Outcome: Progressing     Problem: Patient/Family Goals  Goal: Patient/Family Long Term Goal  Description: Patient's Long Term Goal: discharge    Interventions:  - Monitor vital signs, labs, test results  - Oxygen and respiratory therapy as needed  - Pain management  - Follow MD orders  - Administer medications per MD order  - Diagnostics per order  - Update /  inform patient on plan of care  - Discharge planning  - See additional Care Plan goals for specific interventions  Outcome: Progressing  Goal: Patient/Family Short Term Goal  Description: Patient's Short Term Goal: correct electrolytes    Interventions:   - Nephro recommendations  - Follow MD orders  - Take meds as necessary for electrolyte replacement  - See additional Care Plan goals for specific interventions  Outcome: Progressing     Problem: RESPIRATORY - ADULT  Goal: Achieves optimal ventilation and oxygenation  Description: INTERVENTIONS:  - Assess for changes in respiratory status  - Assess for changes in mentation and behavior  - Position to facilitate oxygenation and minimize respiratory effort  - Oxygen supplementation based on oxygen saturation or ABGs  - Provide Smoking Cessation handout, if applicable  - Encourage broncho-pulmonary hygiene including cough, deep breathe, Incentive Spirometry  - Assess the need for suctioning and perform as needed  - Assess and instruct to report SOB  or any respiratory difficulty  - Respiratory Therapy support as indicated  - Manage/alleviate anxiety  - Monitor for signs/symptoms of CO2 retention  Outcome: Progressing     Problem: CARDIOVASCULAR - ADULT  Goal: Maintains optimal cardiac output and hemodynamic stability  Description: INTERVENTIONS:  - Monitor vital signs, rhythm, and trends  - Monitor for bleeding, hypotension and signs of decreased cardiac output  - Evaluate effectiveness of vasoactive medications to optimize hemodynamic stability  - Monitor arterial and/or venous puncture sites for bleeding and/or hematoma  - Assess quality of pulses, skin color and temperature  - Assess for signs of decreased coronary artery perfusion - ex. Angina  - Evaluate fluid balance, assess for edema, trend weights  Outcome: Progressing  Goal: Absence of cardiac arrhythmias or at baseline  Description: INTERVENTIONS:  - Continuous cardiac monitoring, monitor vital signs, obtain 12 lead EKG if indicated  - Evaluate effectiveness of antiarrhythmic and heart rate control medications as ordered  - Initiate emergency measures for life threatening arrhythmias  - Monitor electrolytes and administer replacement therapy as ordered  Outcome: Progressing     Monitoring vital signs, stable at this moment. Pain medication provided as needed. Call light within reach, bed locked in lowest position, all fall precautions in place. All needs addressed. Frequent rounding maintained by nursing staff. Patient received 1 unit of PRBC for hemoglobin 7.0, recheck shows hemoglobin at 8.8. No acute changes at this time.

## 2024-05-29 NOTE — PHYSICAL THERAPY NOTE
PHYSICAL THERAPY TREATMENT NOTE - INPATIENT     Room Number: 512/512-A       Presenting Problem: acute respiratory failure with hypoxia       Problem List  Principal Problem:    Acute respiratory failure with hypoxia (HCC)      PHYSICAL THERAPY ASSESSMENT   Patient demonstrates good  progress this session, goals  progressing with ambulation this session.    Patient continues to function below baseline with bed mobility, transfers, gait, and performing household tasks.  Contributing factors to remaining limitations include decreased functional strength, decreased endurance/aerobic capacity, impaired dynamic balance, decreased muscular endurance, and medical status.  Next session anticipate patient to progress bed mobility, transfers, and gait.  Physical Therapy will continue to follow patient for duration of hospitalization.    Patient continues to benefit from continued skilled PT services: at discharge to promote functional independence and safety with additional support and return home with home health PT.    PLAN  PT Treatment Plan: Body mechanics;Bed mobility;Endurance;Energy conservation;Patient education;Family education;Gait training;Strengthening;Balance training;Transfer training  Frequency (Obs): 5x/week    SUBJECTIVE  I wasn't able to even walk to the bathroom last week.    OBJECTIVE  Precautions: Bed/chair alarm        PAIN ASSESSMENT   Ratin  Location: denies  Management Techniques: Body mechanics;Activity promotion;Repositioning    BALANCE  Static Sitting: Good  Dynamic Sitting: Fair +  Static Standing: Fair -  Dynamic Standing: Fair -    ACTIVITY TOLERANCE  Pulse: 60  Heart Rate Source: Monitor                    O2 WALK  Oxygen Therapy  SPO2% on Room Air at Rest: 100  SPO2% Ambulation on Room Air: 100    AM-PAC '6-Clicks' INPATIENT SHORT FORM - BASIC MOBILITY  How much difficulty does the patient currently have...  Patient Difficulty: Turning over in bed (including adjusting bedclothes, sheets  and blankets)?: A Little   Patient Difficulty: Sitting down on and standing up from a chair with arms (e.g., wheelchair, bedside commode, etc.): A Little   Patient Difficulty: Moving from lying on back to sitting on the side of the bed?: A Little   How much help from another person does the patient currently need...   Help from Another: Moving to and from a bed to a chair (including a wheelchair)?: A Little   Help from Another: Need to walk in hospital room?: A Little   Help from Another: Climbing 3-5 steps with a railing?: A Little     AM-PAC Score:  Raw Score: 18   Approx Degree of Impairment: 46.58%   Standardized Score (AM-PAC Scale): 43.63   CMS Modifier (G-Code): CK    FUNCTIONAL ABILITY STATUS  Functional Mobility/Gait Assessment  Gait Assistance: Contact guard assist  Distance (ft): 80 ft x2  Assistive Device: Rolling walker  Pattern: Shuffle (slow pace, seated rest break required 2/2 fatigue, RA of B hands difficult to  walker)  Sit to Stand: minimal assist from low chair with RW, CGA from hallway bench with RW    Skilled Therapy Provided: Pt agreeable to therapy, identified by name and , gait belt donned for mobility. Pt on RA, Spo2 100% at rest and with activity. Able to ambulate household distances with RW and seated rest break. Pt rates activity 2/10 on RPE. Pt ed provided on importance of pacing and rest breaks as needed once home for energy conservation.    The patient's Approx Degree of Impairment: 46.58% has been calculated based on documentation in the [facility] '6 clicks' Inpatient Daily Activity Short Form.  Research supports that patients with this level of impairment may benefit from HHPT.  Final disposition will be made by interdisciplinary medical team.        Patient End of Session: Up in chair;Needs met;Call light within reach;RN aware of session/findings;Alarm set    CURRENT GOALS   Goals to be met by: 24  Patient Goal Patient's self-stated goal is: go home   Goal #1 Patient is able  to demonstrate supine - sit EOB @ level: supervision      Goal #1   Current Status  NT   Goal #2 Patient is able to demonstrate transfers Sit to/from Stand at assistance level: supervision with walker - rolling      Goal #2  Current Status  Min A /CGA with RW   Goal #3 Patient is able to ambulate 150 feet with assist device: walker - rolling at assistance level: supervision   Goal #3   Current Status  80 ft x2 with RW, CGA and rest break   Goal #4     Goal #4   Current Status     Goal #5 Patient to demonstrate independence with home activity/exercise instructions provided to patient in preparation for discharge.   Goal #5   Current Status  in progress   Goal #6     Goal #6  Current Status       Gait Training: 15 minutes  Therapeutic Activity: 8 minutes

## 2024-05-29 NOTE — PLAN OF CARE
Problem: Patient Centered Care  Goal: Patient preferences are identified and integrated in the patient's plan of care  Description: Interventions:  - What would you like us to know as we care for you? From home with   - Provide timely, complete, and accurate information to patient/family  - Incorporate patient and family knowledge, values, beliefs, and cultural backgrounds into the planning and delivery of care  - Encourage patient/family to participate in care and decision-making at the level they choose  - Honor patient and family perspectives and choices  Outcome: Progressing     Problem: Patient/Family Goals  Goal: Patient/Family Long Term Goal  Description: Patient's Long Term Goal: discharge    Interventions:  - Monitor vital signs, labs, test results  - Oxygen and respiratory therapy as needed  - Pain management  - Follow MD orders  - Administer medications per MD order  - Diagnostics per order  - Update /  inform patient on plan of care  - Discharge planning  - See additional Care Plan goals for specific interventions  Outcome: Progressing  Goal: Patient/Family Short Term Goal  Description: Patient's Short Term Goal: correct electrolytes    Interventions:   - Nephro recommendations  - Follow MD orders  - Take meds as necessary for electrolyte replacement  - See additional Care Plan goals for specific interventions  Outcome: Progressing     Problem: RESPIRATORY - ADULT  Goal: Achieves optimal ventilation and oxygenation  Description: INTERVENTIONS:  - Assess for changes in respiratory status  - Assess for changes in mentation and behavior  - Position to facilitate oxygenation and minimize respiratory effort  - Oxygen supplementation based on oxygen saturation or ABGs  - Provide Smoking Cessation handout, if applicable  - Encourage broncho-pulmonary hygiene including cough, deep breathe, Incentive Spirometry  - Assess the need for suctioning and perform as needed  - Assess and instruct to report SOB  or any respiratory difficulty  - Respiratory Therapy support as indicated  - Manage/alleviate anxiety  - Monitor for signs/symptoms of CO2 retention  Outcome: Progressing     Problem: CARDIOVASCULAR - ADULT  Goal: Maintains optimal cardiac output and hemodynamic stability  Description: INTERVENTIONS:  - Monitor vital signs, rhythm, and trends  - Monitor for bleeding, hypotension and signs of decreased cardiac output  - Evaluate effectiveness of vasoactive medications to optimize hemodynamic stability  - Monitor arterial and/or venous puncture sites for bleeding and/or hematoma  - Assess quality of pulses, skin color and temperature  - Assess for signs of decreased coronary artery perfusion - ex. Angina  - Evaluate fluid balance, assess for edema, trend weights  Outcome: Progressing  Goal: Absence of cardiac arrhythmias or at baseline  Description: INTERVENTIONS:  - Continuous cardiac monitoring, monitor vital signs, obtain 12 lead EKG if indicated  - Evaluate effectiveness of antiarrhythmic and heart rate control medications as ordered  - Initiate emergency measures for life threatening arrhythmias  - Monitor electrolytes and administer replacement therapy as ordered  Outcome: Progressing     Problem: GASTROINTESTINAL - ADULT  Goal: Maintains or returns to baseline bowel function  Description: INTERVENTIONS:  - Assess bowel function  - Maintain adequate hydration with IV or PO as ordered and tolerated  - Evaluate effectiveness of GI medications  - Encourage mobilization and activity  - Obtain nutritional consult as needed  - Establish a toileting routine/schedule  - Consider collaborating with pharmacy to review patient's medication profile  Outcome: Progressing     Problem: GENITOURINARY - ADULT  Goal: Absence of urinary retention  Description: INTERVENTIONS:  - Assess patient’s ability to void and empty bladder  - Monitor intake/output and perform bladder scan as needed  - Follow urinary retention  protocol/standard of care  - Consider collaborating with pharmacy to review patient's medication profile  - Implement strategies to promote bladder emptying  Outcome: Progressing     Vital signs are stable. PRN Norco given for neck pain that radiates to the head.  Lidocaine patch applied to neck and Aqua K in place.  On room air.  IV lasix on hold, per nephrology.  Low urine output, bladder scan performed with no retention noted.  No bowel movement for 3 days. MD notified.  Scheduled Senokot and Miralax given. Pending occult blood collection.  Primacor infusing, patient tolerated well. Up on the chair almost throughout the shift.  Safety precautions in place.

## 2024-05-29 NOTE — PROGRESS NOTES
[facility]  part of [facility]    Progress Note    Margaret Silverio Patient Status:  Inpatient    3/14/1945 MRN [id number]   Location [facility]5W Attending Regan Cruz MD   Hosp Day # 5 PCP Johnathon Rodriguez DO     Chief Complaint:   Chief Complaint   Patient presents with    Difficulty Breathing       Subjective:   Margaret Silverio is doing well. She denies any SOB, no cough no pain. Eating. Sitting in chair. Feels well. No BM several days which is normal for her she says. She has chronic neck pain and feels it's worse today sharp pain shooting into head. Received toradol by night doctor.     Objective:   Objective:    Blood pressure 134/75, pulse 61, temperature 96.7 °F (35.9 °C), temperature source Oral, resp. rate 19, height 5' 5\" (1.651 m), weight 128 lb 8 oz (58.3 kg), SpO2 99%.    Physical Exam:    General: No acute distress.   Respiratory: Clear to auscultation bilaterally. No wheezes. No rhonchi.  Cardiovascular: S1, S2. Regular rate and rhythm. No murmurs, rubs or gallops.   Abdomen: Soft, nontender, nondistended.  Positive bowel sounds. No rebound or guarding.  Neurologic: No focal neurological deficits.   Musculoskeletal: Moves all extremities.  Extremities: No edema.      Results:   Results:    Labs:  Recent Labs   Lab 24  1750 24  0348 24  0543 24  0548 24  1921 24  0539   WBC 5.3 5.0 5.4 5.0  --  7.9   HGB 8.3* 7.4* 7.3* 7.0* 8.8* 8.4*   .0* 98.3 96.5 101.4*  --  97.4   .0 273.0 268.0 206.0  --  281.0       Recent Labs   Lab 24  1936 24  0348 24  0629 24  0954 24  0548 24  0539   GLU 91 79 100* 129* 86 94   BUN 30* 26* 37* 49* 44* 49*   CREATSERUM 1.47* 1.53* 2.18* 2.42* 2.41* 2.39*   CA 9.4 9.1 8.9 8.9 8.7 9.1   ALB 4.2 3.6 3.6  --   --   --    * 136 133* 136 134* 135*   K 5.9* 5.1 4.7 4.0 3.9 4.0    107 104 106 104 104   CO2 20.0* 17.0* 19.0* 20.0* 19.0*  21.0   ALKPHO 244* 209*  --   --   --   --    AST 41* 32  --   --   --   --    ALT 29 24  --   --   --   --    BILT 1.0 1.1  --   --   --   --    TP 7.8 6.8  --   --   --   --        Estimated Creatinine Clearance: 17.5 mL/min (A) (based on SCr of 2.39 mg/dL (H)).    No results for input(s): \"PTP\", \"INR\" in the last 168 hours.         Culture:  No results found for this visit on 05/24/24.    Cardiac  No results for input(s): \"TROP\", \"PBNP\" in the last 168 hours.      Imaging: Imaging data reviewed in [facility].  XR CHEST AP/PA (1 VIEW) (CPT=71045)    Result Date: 5/28/2024  CONCLUSION:  1. Cardiomegaly.  Prosthetic heart valve. 2. Tortuous atherosclerotic aorta.  ICD transvenous leads unchanged. 3. Prominent central vasculature with right perihilar basilar pleural parenchymal abnormality.  Minimal left basilar pleural reaction.     Dictated by (CST): Justin Stark MD on 5/28/2024 at 8:12 AM     Finalized by (CST): Justin Stark MD on 5/28/2024 at 8:19 AM           Medications:    furosemide  20 mg Intravenous Once    lidocaine-menthol  1 patch Transdermal Daily    polyethylene glycol (PEG 3350)  17 g Oral Daily    sennosides  8.6 mg Oral BID    lidocaine-menthol  1 patch Transdermal Once    [Held by provider] furosemide  40 mg Oral BID (Diuretic)    iron sucrose  200 mg Intravenous Daily    lidocaine-menthol  1 patch Transdermal Daily    amiodarone  200 mg Oral Daily    carvedilol  3.125 mg Oral BID with meals    folic acid  800 mcg Oral Daily    mirtazapine  7.5 mg Oral Nightly    pantoprazole  40 mg Oral BID AC    [Held by provider] sacubitril-valsartan  1 tablet Oral BID         Assessment and Plan:   Assessment & Plan:        Acute hypoxic respiratory failure   Secondary to Pulmonary edema from Acute CHF and anemia.   Initially on NIPPV now on RA.   CXR pulmonary edema. BNP > 5000  Was diuresed with lasix however developed GEOREG. Now lasix on hold   Pulmonary and card on consult.   Dana Blackman    Acute on chronic HFrEF, NICM   ECHO from 2/24 LVEF 15-20%   S/p AICD   GDMT: coreg, hold entresto   Was diuresed now lasix on hold with GEORGE.   GEORGE on CKD III   Baseline creat 1.4-1.6   UA bland   Likely secondary to overdiuresis. Hold lasix.   Renal on consult.   Hold entresto   Urine sodium 17   Rpt labs in AM.   Hold lasix today possible resume tomorrow.  Acute anemia  Baseline Hb 10-12 per EMR back in Feb  Iron panel ok.   Had Colonoscopy Jan 2024 found to have AVM's   No signs of blood loss at this time.   GI on consult  No overt bleeding. H/H improved today   Transfuse 1 unit PRBC   5.     Chronic neck pain with radiculopathy   Gabapentin 100mg TID  Lidoderm patch  AVOID NSAIDS.   Outpt fu with Dr Lee (gets GIUSEPPE)     >55min spent, >50% spent counseling and coordinating care in the form of educating pt/family and d/w consultants and staff. Most of the time spent discussing the above plan.        Plan of care discussed with patient or family at bedside.    Regan Cruz MD  Hospitalist          Supplementary Documentation:     Quality:  DVT Prophylaxis: SCD hold heparin   CODE status: Full   Dispo: per clinical course           Estimated date of discharge: TBD  Discharge is dependent on: clinical stability  At this point Ms. Silverio is expected to be discharge to: home

## 2024-05-29 NOTE — PROGRESS NOTES
[facility]     Gastroenterology Progress Note    Margaret Silverio Patient Status:  Inpatient    3/14/1945 MRN [id number]   Location [facility]5W Attending Regan Cruz MD   Hosp Day # 5 PCP Johnathon Rodriguez, DO       Subjective:   No SOB, CP, orthopnea, swelling  Sitting in chair comfortably on RA  No fever, chills  Tolerating diet without N/V  No BM since   No overt GIB  Objective:   Blood pressure 134/75, pulse 61, temperature 96.7 °F (35.9 °C), temperature source Oral, resp. rate 19, height 5' 5\" (1.651 m), weight 128 lb 8 oz (58.3 kg), SpO2 99%. Body mass index is 21.38 kg/m².    Gen: awake, alert patient, NAD  HEENT: EOMI, the sclera appears anicteric, oropharynx clear, mucus membranes appear moist  CV: RRR  Lung: mild conversational dyspnea, bibasilar crackles on auscultation   Abdomen: soft NTND abdomen with NABS appreciated   Skin: dry, warm, no jaundice  Ext: no LE edema is evident  Neuro: Alert, oriented x4 and interactive  Psych: calm, cooperative    Assessment and Plan:   Margaret Silverio is a 79 year old female w/ PMHx of HTN, CKD 3b, HFrEF (EF 15-20%) + grade 4 DD, s/p AICD, A.fib, s/p bioprosthetic MVR, RA, and h/o GIB/AVMs who presented to ER for SOB. Upon admission BNP found to be >5000. Patient treated with lasix. Patient then found to have GEORGE due to over diuresed. Chronic anemia had been treated with IV venofer and Epogen. GI consulted for anemia and hx of AVMs. Patient hemoglobin upon arrival 8.3, today down to 7.0. Patient today feels well. Notes her shortness of breath has improved, she notes still can have dyspnea when getting up to use the bathroom. She had small brown BM today. No melena or brbpr. No nausea or vomiting. Denies hematemesis. No GERD, dysphagia or globus. No constipation or diarrhea. Weight has been stable. Appetite has been good.      #acute on chronic anemia   -hx of GIB/AVM in the past  -no sign of overt GIB at this  time  -SOB slowly improving, however hemoglobin continues to decline  -prior EGD/cln 1/2024 notable for transverse cln AVM, no malignancy   -can consider possible repeat EGD/cln +/- VCE, however plan to continue monitory output and hemoglobin prior to endoscopic evaluation   -Responded well to 1 unit PRBCs with Hgb stable at 8.4 and no overt GIB.     #acute hypoxic respiratory failure  -cardiology and pulmonology following  -felt due to chronic HFrEF and severe anemia  -management per cardiology      #GEORGE   -diuretics on hold  -management per renal     Recommend:  -Miralax for constipation  -Diet as tolerated  -Empiric PPI BID  -IV venofer  -Trend hemoglobin, transfuse to goal >7  -Will reserve endoscopic evaluation for drop in hemoglobin requiring transfusion or overt GIB.    Case discussed with Nedra Resendez MD and Christos GIL.    Katie Tinajero DNP, FNP-[facility] Gastroenterology  5/29/2024      Results:     Lab Results   Component Value Date    WBC 7.9 05/29/2024    HGB 8.4 (L) 05/29/2024    HCT 26.1 (L) 05/29/2024    .0 05/29/2024    CREATSERUM 2.39 (H) 05/29/2024    BUN 49 (H) 05/29/2024     (L) 05/29/2024    K 4.0 05/29/2024     05/29/2024    CO2 21.0 05/29/2024    GLU 94 05/29/2024    CA 9.1 05/29/2024    ALB 3.6 05/26/2024    ALKPHO 209 (H) 05/25/2024    BILT 1.1 05/25/2024    TP 6.8 05/25/2024    AST 32 05/25/2024    ALT 24 05/25/2024    PTT 35.2 02/09/2024    INR 1.25 (H) 02/09/2024    T4F 1.2 02/03/2024    TSH 10.681 (H) 02/03/2024    LIP 32 01/13/2024    ESRML 61 (H) 04/23/2024    CRP 5.10 (H) 04/23/2024    MG 2.2 05/25/2024    PHOS 4.2 05/26/2024    B12 >2,000 (H) 02/03/2024       XR CHEST AP/PA (1 VIEW) (CPT=71045)    Result Date: 5/28/2024  CONCLUSION:  1. Cardiomegaly.  Prosthetic heart valve. 2. Tortuous atherosclerotic aorta.  ICD transvenous leads unchanged. 3. Prominent central vasculature with right perihilar basilar pleural parenchymal abnormality.  Minimal left  basilar pleural reaction.     Dictated by (CST): Justin Stark MD on 5/28/2024 at 8:12 AM     Finalized by (CST): Justin Stark MD on 5/28/2024 at 8:19 AM

## 2024-05-29 NOTE — PROGRESS NOTES
[facility]  part of [facility]    Progress Note    Margaret Silverio Patient Status:  Inpatient    3/14/1945 MRN [id number]   Location [facility]5W Attending Raiza Alvarez MD   Hosp Day # 5 PCP Johnathon Rodriguez, DO       Subjective:   Margaret Silverio is a(n) 79 year old female who I am seeing for ckd/vol overload    Feels much better  Breathing is better  No new issues  S/p PRBC  Received toradol for pain last night    Objective:   /83 (BP Location: Right arm)   Pulse 60   Temp 98.1 °F (36.7 °C) (Oral)   Resp 20   Ht 5' 5\" (1.651 m)   Wt 128 lb 8 oz (58.3 kg)   SpO2 98%   BMI 21.38 kg/m²      Intake/Output Summary (Last 24 hours) at 2024 1358  Last data filed at 2024 1100  Gross per 24 hour   Intake 1250 ml   Output 450 ml   Net 800 ml     Wt Readings from Last 1 Encounters:   24 128 lb 8 oz (58.3 kg)       Exam  Vital signs: Blood pressure 135/83, pulse 60, temperature 98.1 °F (36.7 °C), temperature source Oral, resp. rate 20, height 5' 5\" (1.651 m), weight 128 lb 8 oz (58.3 kg), SpO2 98%.    General: No acute distress. Alert and oriented x 3.  HEENT: Moist mucous membranes. EOMI. PERRLA  Neck:  No JVD.   Respiratory: Clear to auscultation bilaterally.    Cardiovascular: S1, S2.  Regular rate and rhythm.   Abdomen: Soft, nontender, nondistended.    Neurologic: No focal neurological deficits.  Musculoskeletal: Full range of motion of all extremities.  No swelling noted.  Access:    Results:     Recent Labs   Lab 24  1750 24  0348 24  0543 24  0548 24  1921 24  0539   RBC 2.69* 2.41* 2.26* 2.21*  --  2.68*   HGB 8.3* 7.4* 7.3* 7.0* 8.8* 8.4*   HCT 27.7* 23.7* 21.8* 22.4* 27.5* 26.1*   .0* 98.3 96.5 101.4*  --  97.4   MCH 30.9 30.7 32.3 31.7  --  31.3   MCHC 30.0* 31.2 33.5 31.3  --  32.2   RDW 18.7* 17.7* 18.3* 19.9*  --  21.0*   NEPRELIM 3.67 3.33  --   --   --   --    WBC 5.3 5.0 5.4 5.0  --  7.9   PLT  303.0 273.0 268.0 206.0  --  281.0         Recent Labs   Lab 05/27/24  0954 05/28/24  0548 05/29/24  0539   * 86 94   BUN 49* 44* 49*   CREATSERUM 2.42* 2.41* 2.39*   CA 8.9 8.7 9.1    134* 135*   K 4.0 3.9 4.0    104 104   CO2 20.0* 19.0* 21.0          XR CHEST AP/PA (1 VIEW) (CPT=71045)    Result Date: 5/28/2024  CONCLUSION:  1. Cardiomegaly.  Prosthetic heart valve. 2. Tortuous atherosclerotic aorta.  ICD transvenous leads unchanged. 3. Prominent central vasculature with right perihilar basilar pleural parenchymal abnormality.  Minimal left basilar pleural reaction.     Dictated by (CST): Justin Stark MD on 5/28/2024 at 8:12 AM     Finalized by (CST): Justin Stark MD on 5/28/2024 at 8:19 AM           Assessment and Plan:     79 year old female with past medical history of HTN, CKD 3b, HFrEF (EF 15-20%) + grade 4 DD, s/p AICD, A.fib, s/p bioprosthetic MVR, RA, and h/o GIB/AVMs, who was recently admitted from 5/16-5/18 for hypotension and george/ckd. S/p IVF and cr improved. Was discharged without diuretics.  She presented serge to ER with sob/ CHF exacerbation. Started on IV lasix. Cr at baseline on admission.   B/l cr 1.3-1.6 mg/dl     Sees Dr Arvizu as outpatient    Impression:    GEORGE likely over diuresed. Hold lasix for today, bladder scan and urine sodium  CKD 3 cr at b/l 1.4-1.6 mg/dl/ monitor cr with diuresis. [place] UA-cr bumped up with diuretics  HTN with CKD: acceptable. On coreg and entresto  Hyperkalemia s/p medical management . On entresto at home.  CHFrEF exacerbation, IV diuretics--> transition to PO diuretics  Anemia, can be contributing to her sob. IV iron and will give ROSA + OPRBC yest      Plan:    Cr is stable, however dec uop. Per nurse prob not accurate  Will hold diuretics for one more day since also received toradol. Will monitor renal fx, bladder scan negligible.  IV iron,+ rosa given   Labs in am    Please avoid nsaids/toradol in george pt      Thank you very much  for allowing me to participate in the care of your patient.  If you have any questions, please do not hesitate to contact me.     Keerthi Naqvi MD

## 2024-05-29 NOTE — CM/SW NOTE
05/28/24 1300   Choice of Post-Acute Provider   List of appropriate post-acute services provided to patient/family with quality data Yes     SW followed up on discharge planning.    SW met with pt and  Low bedside.    SW gave pt list of accepting [facility] agencies for choice.  Pt stated she has to think about home health care.  Pt has a history of non-compliance with services.    SW contact information provided for pt if any questions arise regarding home services.    PLAN: DC home w/HHC pending choice if pt agreeable    / to remain available for support and/or discharge planning.     Brina Prado MSW, LSW [phone]

## 2024-05-29 NOTE — PAYOR COMM NOTE
--------------  CONTINUED STAY REVIEW  5/28-5/29  Payor: [employer]  Subscriber #:  [id number]  Authorization Number: [id number]    Admit date: 5/24/24  Admit time:  9:02 PM    REVIEW DOCUMENTATION:    5/28 IM    Objective:      Objective:  Blood pressure 134/82, pulse 60, temperature 97.4 °F (36.3 °C), temperature source Oral, resp. rate 18, height 5' 5\" (1.651 m), weight 127 lb (57.6 kg), SpO2 100%.     Physical Exam:    General: No acute distress.   Respiratory: Clear to auscultation bilaterally. No wheezes. No rhonchi.  Cardiovascular: S1, S2. Regular rate and rhythm. No murmurs, rubs or gallops.   Abdomen: Soft, nontender, nondistended.  Positive bowel sounds. No rebound or guarding.  Neurologic: No focal neurological deficits.   Musculoskeletal: Moves all extremities.  Extremities: No edema.    Lab 05/24/24  1750 05/25/24  0348 05/26/24  0543 05/28/24  0548   WBC 5.3 5.0 5.4 5.0   HGB 8.3* 7.4* 7.3* 7.0*   .0* 98.3 96.5 101.4*   .0 273.0 268.0 206.0                 Recent Labs   Lab 05/24/24  1936 05/25/24  0348 05/26/24  0629 05/27/24  0954 05/28/24  0548   GLU 91 79 100* 129* 86   BUN 30* 26* 37* 49* 44*   CREATSERUM 1.47* 1.53* 2.18* 2.42* 2.41*   CA 9.4 9.1 8.9 8.9 8.7   ALB 4.2 3.6 3.6  --   --    * 136 133* 136 134*   K 5.9* 5.1 4.7 4.0 3.9    107 104 106 104   CO2 20.0* 17.0* 19.0* 20.0* 19.0*   ALKPHO 244* 209*  --   --   --    AST 41* 32  --   --   --    ALT 29 24  --   --   --    BILT 1.0 1.1  --   --   --    TP 7.8 6.8  --   --   --       iron sucrose  200 mg Intravenous Daily          Assessment & Plan:  Acute hypoxic respiratory failure   Secondary to Pulmonary edema from Acute CHF and anemia.   Initially on NIPPV now on RA.   CXR pulmonary edema. BNP > 5000  Was diuresed with lasix however developed GEORGE. Now lasix on hold   Pulmonary and card on consult.   Dana Blackman   Acute on chronic HFrEF, NICM   ECHO from 2/24 LVEF 15-20%   S/p AICD   GDMT:  coreg, hold entresto   Was diuresed now lasix on hold with GEORGE.   GEORGE on CKD III   Baseline creat 1.4-1.6   UA bland   Likely secondary to overdiuresis. Hold lasix.   Renal on consult.   Hold entresto   Urine sodium 17   Rpt labs in AM.   Acute anemia  Baseline Hb 10-12 per EMR back in Feb  Check iron panel, ferritin  FOB.   Had Colonoscopy Jan 2024 found to have AVM's   No signs of blood loss at this time. Though with progressive anemia may need GI eval.   GI consult  Transfuse 1 unit PRBC   >55min spent, >50% spent counseling and coordinating care in the form of educating pt/family and d/w consultants and staff. Most of the time spent discussing the above plan.             5/28 Pulmonary  Breath sounds: Rales (bibasilar) present.             Assessment & Plan:  Acute hypoxemic respiratory failure  Due to acute on chronic HFrEF and severe anemia  Possible underlying COPD with prior tobacco history  Also has underlying RA  Chest x-ray with pulmonary edema and trace basilar effusions/atelectasis  Chest x-ray 5/28 with improvement  Clinically improved - initially required BiPAP and now on room air  Plan:  -Management of CHF as per cardiology  -DuoNebs as needed     Acute on chronic HFrEF  Echo EF 15-20%, grade 4 diastolic dysfunction, wide-open tricuspid regurgitation  BNP markedly elevated  ?Inaccurate I/Os(+1.7 L and low urine output)  Plan:  -I/Os  -As per cardiology     Acute on chronic kidney disease  Rise in creatinine with diuresis  Plan:  -Diuretics on hold  -As per renal     Acute on chronic anemia  Plan:  -IV Venofer     DVT prophylaxis: Subcutaneous heparin          5/28 Nephrology    79 year old female with past medical history of HTN, CKD 3b, HFrEF (EF 15-20%) + grade 4 DD, s/p AICD, A.fib, s/p bioprosthetic MVR, RA, and h/o GIB/AVMs, who was recently admitted from 5/16-5/18 for hypotension and george/ckd. S/p IVF and cr improved. Was discharged without diuretics.  She presented bac to ER with sob/ CHF  exacerbation. Started on IV lasix. Cr at baseline on admission.   B/l cr 1.3-1.6 mg/dl     Sees Dr Arvizu as outpatient     Impression:     GEORGE likely over diuresed. Hold lasix for today, bladder scan and urine sodium  CKD 3 cr at b/l 1.4-1.6 mg/dl/ monitor cr with diuresis. [place] UA-cr bumped up with diuretics  HTN with CKD: acceptable. On coreg and entresto  Hyperkalemia s/p medical management . On entresto at home.  CHFrEF exacerbation, IV diuretics--> transition to PO diuretics  Anemia, can be contributing to her sob. IV iron and will give ROSA        Plan:     Bump in creatinine with diuretics-cr stable after holding diuretics  IV iron,+ rosa given   Prbc ?   Henry anemia  Labs in am          5/28 GI    Reason for Consultation:  Anemia      History of Present Illness:  Margaret Silverio is a 79 year old female w/ PMHx of HTN, CKD 3b, HFrEF (EF 15-20%) + grade 4 DD, s/p AICD, A.fib, s/p bioprosthetic MVR, RA, and h/o GIB/AVMs who presented to ER for SOB. Upon admission BNP found to be >5000. Patient treated with lasix. Patient then found to have GEORGE due to over diuresed. Chronic anemia had been treated with IV venofer and Epogen. GI consulted for anemia and hx of AVMs. Patient hemoglobin upon arrival 8.3, today down to 7.0. Patient today feels well. Notes her shortness of breath has improved, she notes still can have dyspnea when getting up to use the bathroom. She had small brown BM today. No melena or brbpr. No nausea or vomiting. Denies hematemesis. No GERD, dysphagia or globus. No constipation or diarrhea. Weight has been stable. Appetite has been good.      Pertinent Family Hx:  - No known history of esophageal, gastric or colon cancers  - No known IBD  - No known liver pathologies     Endoscopy Hx:  Cln/EGD 1/17/2024- Dr. Rios  Impression:  Mild gastric erythema, biopsied  Single small transverse colon AVM s/p APC     Recommend:  Follow-up gastric bx   May resume diet  Weigh risks and benefits of  anticoagulation given chronic GI AVM disease   Final Diagnosis:      Stomach; biopsy:  Fragments of gastric mucosa demonstrating focal minimal/mild chronic inactive gastritis with scattered eosinophils.   No evidence of intestinal metaplasia, epithelial dysplasia, or malignancy identified.   No evidence of bacterial organisms consistent with Helicobacter species seen on Giemsa stain (appropriate control reactivity).              5/29 Pulmonary  No bowel movement in several days.     Breath sounds: Rales (bibasilar) present.     omponent Value Date     WBC 7.9 05/29/2024     HGB 8.4 (L) 05/29/2024     HCT 26.1 (L) 05/29/2024     .0 05/29/2024     CREATSERUM 2.39 (H) 05/29/2024     BUN 49 (H) 05/29/2024      (L) 05/29/2024     K 4.0 05/29/2024      05/29/2024     CO2 21.0 05/29/2024     GLU 94 05/29/2024     CA 9.1 05/29/2024          Assessment & Plan:  Acute hypoxemic respiratory failure  Due to acute on chronic HFrEF and severe anemia  Possible underlying COPD with prior tobacco history  Also has underlying RA  Chest x-ray with pulmonary edema and trace basilar effusions/atelectasis  Chest x-ray 5/28 with improvement  Clinically improved - initially required BiPAP and now on room air  Plan:  -Management of CHF as per cardiology  -DuoNebs as needed     Acute on chronic HFrEF  Echo EF 15-20%, grade 4 diastolic dysfunction, wide-open tricuspid regurgitation  BNP markedly elevated  ?Inaccurate I/Os (+2.4 L and low urine output)  Diuretics on hold due to worsening renal function  Plan:  -I/Os  -As per cardiology     Acute on chronic kidney disease  Rise in creatinine with diuresis  Plan:  -Diuretics on hold  -As per renal     Acute on chronic anemia  Seen by GI - h/o AVMs  S/p 1 unit pRBC 5/28  Plan:  -IV Venofer  -PPI  -Okay to proceed with scopes from pulmonary perspective if needed  -As per GI     DVT prophylaxis: SCDs only in patient with severe anemia.            5/29 Cardiology  Cardiovascular:       Rate and Rhythm: Normal rate and regular rhythm.      Pulses: Normal pulses.           Radial pulses are 2+ on the right side and 2+ on the left side.        Dorsalis pedis pulses are 2+ on the right side and 2+ on the left side.      Heart sounds: S1 normal and S2 normal. Murmur heard.      Systolic murmur is present with a grade of 2/6.      No friction rub. No gallop.   Pulmonary:      Effort: Pulmonary effort is normal. No respiratory distress.      Breath sounds: Decreased air movement present. No wheezing, rhonchi or rales.      Comments: Fine crackles on bases     iron sucrose  200 mg Intravenous Daily            Assessment/Plan:     Pt was recently admitted for hypotension and dehydration, discharged off diuretics, admitted on 5/25 with fluid overload      Acute on chronic HFrEF, NICM  - echo from 2/24 with LVEF 15-20%  - s/p CD/ICD  - chest xray 5/24/24 moderate pulmonary edema   - BNP more than 5000 on admission  - pt was on bipap, now on room air   - on PO lasix  - I&O not accurately documented  - GDMT BB, (ARNI, lasix on hold due to elevated creatinine)       Valvular dysfunction  - severe TR   - h/o bioprosthetic mitral valve replacement     Atrial flutter/Fibrillation  - Tele AV paced  - on amiodarone  - s/p watchman device     CKD 3  - baseline creatinine of 1.4-1.6  - Serum creatinine stable at 2.3  - nephrology following        Plan;  - pt has bilateral crackles on bases, will recommend giving lasix today, will discuss with nephrology  - wt up 3lb since admission, minimal urine output  - continue coreg and amiodarone   - entresto and lasix on hold with elevated creatinine   - strict intake and output  - daily weight  - monitor renal function closely        Addendum: 1028  Discussed with Dr Naqvi, Nephrology, plan to give lasix 20mg today and monitor output closely'          MEDICATIONS ADMINISTERED IN LAST 1 DAY:  Transfuse RBC       Date Action Dose Route User    5/28/2024 1615 Rate/Dose Change  (none) Intravenous Sushma Thrasher RN    5/28/2024 1515 New Bag (none) Intravenous Sushma Thrasher RN          acetaminophen (Tylenol) tab 650 mg       Date Action Dose Route User    5/28/2024 2001 Given 650 mg Oral Rajesh Anderson RN          amiodarone (Pacerone) tab 200 mg       Date Action Dose Route User    5/29/2024 0850 Given 200 mg Oral Zoë Sher RN          carvedilol (Coreg) tab 3.125 mg       Date Action Dose Route User    5/29/2024 0850 Given 3.125 mg Oral Zoë Sher RN    5/28/2024 1715 Given 3.125 mg Oral Sushma Thrasher RN          folic acid (Folvite) tab 800 mcg       Date Action Dose Route User    5/29/2024 0850 Given 800 mcg Oral Zoë Sher RN          HYDROcodone-acetaminophen (Norco) 5-325 MG per tab 2 tablet       Date Action Dose Route User    5/29/2024 0851 Given 2 tablet Oral Zoë Sher RN    5/29/2024 0024 Given 2 tablet Oral Rajesh Anderson RN    5/28/2024 1124 Given 2 tablet Oral Sushma Thrasher RN          iron sucrose (Venofer) 20 mg/mL injection 200 mg       Date Action Dose Route User    5/29/2024 0850 New Bag 200 mg Intravenous Zoë Sher RN          ketorolac (Toradol) 15 MG/ML injection 15 mg       Date Action Dose Route User    5/28/2024 2250 Given 15 mg Intravenous Rajesh Anderson RN          lidocaine-menthol 4-1 % patch 1 patch       Date Action Dose Route User    5/29/2024 0847 Patch Applied 1 patch Transdermal (Mid Back) Zoë Sher RN          lidocaine-menthol 4-1 % patch 1 patch       Date Action Dose Route User    5/28/2024 2251 Patch Applied 1 patch Transdermal (Right Upper Back) Rajesh Anderson RN          mirtazapine (Remeron) tab 7.5 mg       Date Action Dose Route User    5/28/2024 2001 Given 7.5 mg Oral Rajesh Anderson RN          pantoprazole (Protonix) DR tab 40 mg       Date Action Dose Route User    5/29/2024 0555 Given 40 mg Oral Rajesh Anderson RN    5/28/2024 1715 Given 40 mg Oral Sushma Thrasher RN          sodium chloride 0.9% infusion       Date Action  Dose Route User    5/28/2024 1315 New Bag (none) Intravenous Sushma Thrasher, RN            Vitals (last day)       Date/Time Temp Pulse Resp BP SpO2 Weight O2 Device O2 Flow Rate (L/min) [facility]    05/29/24 0833 96.7 °F (35.9 °C) 61 19 134/75 99 % -- -- -- GV    05/29/24 0833 -- -- -- -- -- -- None (Room air) -- SL    05/29/24 0631 -- -- -- -- -- 128 lb 8 oz -- -- BA    05/29/24 0554 97.3 °F (36.3 °C) 60 18 135/73 97 % -- None (Room air) --     05/29/24 0023 96.9 °F (36.1 °C) 60 21 121/79 99 % -- None (Room air) --     05/28/24 2003 97.5 °F (36.4 °C) 61 20 134/74 100 % -- None (Room air) --     05/28/24 1900 97.8 °F (36.6 °C) 60 18 131/79 99 % -- None (Room air) -- CT    05/28/24 1815 97.6 °F (36.4 °C) 60 16 128/70 94 % -- None (Room air) -- CT    05/28/24 1715 97.8 °F (36.6 °C) 60 18 124/66 100 % -- None (Room air) -- CT    05/28/24 1615 -- -- -- -- -- -- None (Room air) -- SC    05/28/24 1615 98 °F (36.7 °C) 60 18 121/66 99 % -- -- -- CT    05/28/24 1530 97.8 °F (36.6 °C) 60 17 126/61 99 % -- None (Room air) -- CT    05/28/24 1515 97.8 °F (36.6 °C) 60 18 125/65 100 % -- -- -- CT    05/28/24 1243 97.6 °F (36.4 °C) 60 16 122/81 100 % -- None (Room air) -- CT    05/28/24 0817 97.4 °F (36.3 °C) 60 18 134/82 100 % -- None (Room air) -- CT    05/28/24 0534 97.1 °F (36.2 °C) 60 19 114/79 99 % 127 lb None (Room air) -- JG                Blood Transfusion Record       Product Unit Status Volume Start End            Transfuse RBC       24  [id number]  [id number] Completed 05/28/24 1859 793 mL 05/28/24 1515 05/28/24 1859

## 2024-05-30 ENCOUNTER — APPOINTMENT (OUTPATIENT)
Dept: GENERAL RADIOLOGY | Facility: HOSPITAL | Age: 79
End: 2024-05-30
Attending: PHYSICIAN ASSISTANT
Payer: MEDICARE

## 2024-05-30 PROBLEM — M54.2 CERVICAL PAIN: Status: ACTIVE | Noted: 2024-05-30

## 2024-05-30 PROBLEM — M54.12 CERVICAL RADICULOPATHY: Status: ACTIVE | Noted: 2024-05-30

## 2024-05-30 PROBLEM — M54.12 CERVICAL RADICULOPATHY: Status: ACTIVE | Noted: 2024-01-01

## 2024-05-30 PROBLEM — M54.2 CERVICAL PAIN: Status: ACTIVE | Noted: 2024-01-01

## 2024-05-30 LAB
ALBUMIN SERPL-MCNC: 3.6 G/DL (ref 3.2–4.8)
ANION GAP SERPL CALC-SCNC: 10 MMOL/L (ref 0–18)
BUN BLD-MCNC: 42 MG/DL (ref 9–23)
BUN/CREAT SERPL: 20.3 (ref 10–20)
CALCIUM BLD-MCNC: 8.9 MG/DL (ref 8.7–10.4)
CHLORIDE SERPL-SCNC: 105 MMOL/L (ref 98–112)
CO2 SERPL-SCNC: 21 MMOL/L (ref 21–32)
CREAT BLD-MCNC: 2.07 MG/DL
DEPRECATED RDW RBC AUTO: 73.1 FL (ref 35.1–46.3)
EGFRCR SERPLBLD CKD-EPI 2021: 24 ML/MIN/1.73M2 (ref 60–?)
ERYTHROCYTE [DISTWIDTH] IN BLOOD BY AUTOMATED COUNT: 21.4 % (ref 11–15)
GLUCOSE BLD-MCNC: 91 MG/DL (ref 70–99)
HCT VFR BLD AUTO: 26.3 %
HEMOCCULT STL QL: NEGATIVE
HGB BLD-MCNC: 8.2 G/DL
MCH RBC QN AUTO: 30.8 PG (ref 26–34)
MCHC RBC AUTO-ENTMCNC: 31.2 G/DL (ref 31–37)
MCV RBC AUTO: 98.9 FL
OSMOLALITY SERPL CALC.SUM OF ELEC: 292 MOSM/KG (ref 275–295)
PHOSPHATE SERPL-MCNC: 3.7 MG/DL (ref 2.4–5.1)
PLATELET # BLD AUTO: 308 10(3)UL (ref 150–450)
POTASSIUM SERPL-SCNC: 3.8 MMOL/L (ref 3.5–5.1)
RBC # BLD AUTO: 2.66 X10(6)UL
SODIUM SERPL-SCNC: 136 MMOL/L (ref 136–145)
WBC # BLD AUTO: 9.1 X10(3) UL (ref 4–11)

## 2024-05-30 PROCEDURE — 99232 SBSQ HOSP IP/OBS MODERATE 35: CPT | Performed by: INTERNAL MEDICINE

## 2024-05-30 PROCEDURE — 99232 SBSQ HOSP IP/OBS MODERATE 35: CPT | Performed by: PHYSICIAN ASSISTANT

## 2024-05-30 PROCEDURE — 72050 X-RAY EXAM NECK SPINE 4/5VWS: CPT | Performed by: PHYSICIAN ASSISTANT

## 2024-05-30 PROCEDURE — 99222 1ST HOSP IP/OBS MODERATE 55: CPT | Performed by: PHYSICAL MEDICINE & REHABILITATION

## 2024-05-30 PROCEDURE — 99233 SBSQ HOSP IP/OBS HIGH 50: CPT | Performed by: HOSPITALIST

## 2024-05-30 PROCEDURE — 99222 1ST HOSP IP/OBS MODERATE 55: CPT | Performed by: PHYSICIAN ASSISTANT

## 2024-05-30 RX ORDER — FUROSEMIDE 10 MG/ML
20 INJECTION INTRAMUSCULAR; INTRAVENOUS ONCE
Status: COMPLETED | OUTPATIENT
Start: 2024-05-30 | End: 2024-05-30

## 2024-05-30 RX ORDER — HYDRALAZINE HYDROCHLORIDE 25 MG/1
25 TABLET, FILM COATED ORAL EVERY 8 HOURS SCHEDULED
Status: DISCONTINUED | OUTPATIENT
Start: 2024-05-30 | End: 2024-06-03

## 2024-05-30 RX ORDER — ISOSORBIDE DINITRATE 20 MG/1
20 TABLET ORAL
Status: DISCONTINUED | OUTPATIENT
Start: 2024-05-30 | End: 2024-06-03

## 2024-05-30 RX ORDER — POTASSIUM CHLORIDE 20 MEQ/1
20 TABLET, EXTENDED RELEASE ORAL ONCE
Status: COMPLETED | OUTPATIENT
Start: 2024-05-30 | End: 2024-05-30

## 2024-05-30 RX ORDER — GABAPENTIN 300 MG/1
300 CAPSULE ORAL 2 TIMES DAILY
Status: DISCONTINUED | OUTPATIENT
Start: 2024-05-30 | End: 2024-06-03

## 2024-05-30 NOTE — PROGRESS NOTES
[facility]  part of [facility]    Progress Note    Margaret Silverio Patient Status:  Inpatient    3/14/1945 MRN [id number]   Location [facility]5W Attending Regan Cruz MD   Hosp Day # 6 PCP Johnathon Rodriguez,      Subjective:   Seen and examined while resting in bed. Feels better. Denies dyspnea and cough. Denies orthopnea and leg swelling. No bowel movement in several days. On room air.    Objective:   Blood pressure 124/74, pulse 73, temperature 97.2 °F (36.2 °C), temperature source Axillary, resp. rate 16, height 5' 5\" (1.651 m), weight 129 lb 4.8 oz (58.7 kg), SpO2 96%.  Physical Exam  Vitals and nursing note reviewed.   Constitutional:       General: She is awake. She is not in acute distress.     Appearance: Normal appearance. She is not ill-appearing.   HENT:      Head: Normocephalic and atraumatic.   Cardiovascular:      Rate and Rhythm: Normal rate and regular rhythm.      Heart sounds: Murmur heard.   Pulmonary:      Effort: Pulmonary effort is normal. No respiratory distress.      Breath sounds: Rales (bibasilar) present. No wheezing or rhonchi.   Musculoskeletal:      Cervical back: Normal range of motion and neck supple.      Right lower leg: No edema.      Left lower leg: No edema.   Skin:     General: Skin is warm and dry.   Neurological:      General: No focal deficit present.      Mental Status: She is alert and oriented to person, place, and time.   Psychiatric:         Mood and Affect: Mood normal.         Behavior: Behavior is cooperative.       Results:   Lab Results   Component Value Date    WBC 9.1 2024    HGB 8.2 (L) 2024    HCT 26.3 (L) 2024    .0 2024    CREATSERUM 2.07 (H) 2024    BUN 42 (H) 2024     2024    K 3.8 2024     2024    CO2 21.0 2024    GLU 91 2024    CA 8.9 2024    ALB 3.6 2024    ALKPHO 209 (H) 2024    BILT 1.1 2024    TP  6.8 05/25/2024    AST 32 05/25/2024    ALT 24 05/25/2024    PTT 35.2 02/09/2024    INR 1.25 (H) 02/09/2024    T4F 1.2 02/03/2024    TSH 10.681 (H) 02/03/2024    LIP 32 01/13/2024    ESRML 61 (H) 04/23/2024    CRP 5.10 (H) 04/23/2024    MG 2.2 05/25/2024    PHOS 3.7 05/30/2024    TROPHS 24 05/24/2024    B12 >2,000 (H) 02/03/2024     Assessment & Plan:   Acute hypoxemic respiratory failure  Due to acute on chronic HFrEF and severe anemia  Possible underlying COPD with prior tobacco history  Also has underlying RA  Chest x-ray with pulmonary edema and trace basilar effusions/atelectasis  Chest x-ray 5/28 with improvement  Clinically improved - initially required BiPAP and now on room air  Plan:  -Management of CHF as per cardiology  -DuoNebs as needed  -Okay to discharge from pulmonary perspective    Acute on chronic HFrEF  Echo EF 15-20%, grade 4 diastolic dysfunction, wide-open tricuspid regurgitation  BNP markedly elevated  ?Inaccurate I/Os (+2.6 L and low urine output)  Diuretics on hold due to worsening renal function  Plan:  -I/Os  -As per cardiology    Acute on chronic kidney disease  Rise in creatinine with diuresis, now downtrending  Plan:  -Diuretics on hold as per renal    Acute on chronic anemia  Seen by GI - h/o AVMs  S/p 1 unit pRBC 5/28  Hemoglobin stable  Plan:  -IV Venofer  -PPI  -As per GI    DVT prophylaxis: SCDs only in patient with severe anemia.    Jigar Lawrence PA-C  Pulmonary Medicine  5/30/2024

## 2024-05-30 NOTE — PROGRESS NOTES
[facility]  part of [facility]    Progress Note    Margaret Silverio Patient Status:  Inpatient    3/14/1945 MRN [id number]   Location [facility]5W Attending Raiza Alvarez MD   Hosp Day # 6 PCP Johnathon Rodriguez, DO       Subjective:   Margaret Silverio is a(n) 79 year old female who I am seeing for ckd/vol overload    Feels much better  Breathing is better  No new issues  On primacor  Uop slightly better 825     Objective:   /58 (BP Location: Left arm)   Pulse 60   Temp 97.8 °F (36.6 °C) (Oral)   Resp 18   Ht 5' 5\" (1.651 m)   Wt 129 lb 4.8 oz (58.7 kg)   SpO2 98%   BMI 21.52 kg/m²      Intake/Output Summary (Last 24 hours) at 2024 0938  Last data filed at 2024 0600  Gross per 24 hour   Intake 1035.75 ml   Output 825 ml   Net 210.75 ml     Wt Readings from Last 1 Encounters:   24 129 lb 4.8 oz (58.7 kg)       Exam  Vital signs: Blood pressure 129/58, pulse 60, temperature 97.8 °F (36.6 °C), temperature source Oral, resp. rate 18, height 5' 5\" (1.651 m), weight 129 lb 4.8 oz (58.7 kg), SpO2 98%.    General: No acute distress. Alert and oriented x 3.  HEENT: Moist mucous membranes. EOMI. PERRLA  Neck:  No JVD.   Respiratory: Clear to auscultation bilaterally.    Cardiovascular: S1, S2.  Regular rate and rhythm.   Abdomen: Soft, nontender, nondistended.    Neurologic: No focal neurological deficits.  Musculoskeletal: Full range of motion of all extremities.  No swelling noted.  Access:    Results:     Recent Labs   Lab 24  1750 24  0348 24  0543 24  0548 24  1921 24  0539 24  0457   RBC 2.69* 2.41*   < > 2.21*  --  2.68* 2.66*   HGB 8.3* 7.4*   < > 7.0* 8.8* 8.4* 8.2*   HCT 27.7* 23.7*   < > 22.4* 27.5* 26.1* 26.3*   .0* 98.3   < > 101.4*  --  97.4 98.9   MCH 30.9 30.7   < > 31.7  --  31.3 30.8   MCHC 30.0* 31.2   < > 31.3  --  32.2 31.2   RDW 18.7* 17.7*   < > 19.9*  --  21.0* 21.4*   NEPRELIM 3.67  3.33  --   --   --   --   --    WBC 5.3 5.0   < > 5.0  --  7.9 9.1   .0 273.0   < > 206.0  --  281.0 308.0    < > = values in this interval not displayed.         Recent Labs   Lab 05/28/24  0548 05/29/24  0539 05/30/24  0457   GLU 86 94 91   BUN 44* 49* 42*   CREATSERUM 2.41* 2.39* 2.07*   CA 8.7 9.1 8.9   * 135* 136   K 3.9 4.0 3.8    104 105   CO2 19.0* 21.0 21.0          No results found.    Assessment and Plan:     79 year old female with past medical history of HTN, CKD 3b, HFrEF (EF 15-20%) + grade 4 DD, s/p AICD, A.fib, s/p bioprosthetic MVR, RA, and h/o GIB/AVMs, who was recently admitted from 5/16-5/18 for hypotension and george/ckd. S/p IVF and cr improved. Was discharged without diuretics.  She presented serge to ER with sob/ CHF exacerbation. Started on IV lasix. Cr at baseline on admission.   B/l cr 1.3-1.6 mg/dl     Sees Dr Arvizu as outpatient    Impression:    GEORGE likely over diuresed. Hold lasix for today, bladder scan and urine sodium  CKD 3 cr at b/l 1.4-1.6 mg/dl/ monitor cr with diuresis. Preston UA-cr bumped up with diuretics  HTN with CKD: acceptable. On coreg and entresto  Hyperkalemia s/p medical management . On entresto at home.  CHFrEF exacerbation, IV diuretics--> transition to PO diuretics  Anemia, can be contributing to her sob. IV iron and will give ROSA + OPRBC yest      Plan:    Renal fx slightly better on primacor  We can give a dose of lasix today  IV iron,+ rosa + prbc giveb  Labs in am  Prob bumex 1 mg po daily on dc -- + chf clinic fu    Please avoid nsaids/toradol in george pt      Thank you very much for allowing me to participate in the care of your patient.  If you have any questions, please do not hesitate to contact me.     Keerthi Naqvi MD

## 2024-05-30 NOTE — PROGRESS NOTES
Progress Note  Margaret Silverio Patient Status:  Inpatient    3/14/1945 MRN [id number]   Location [facility]5W Attending Regan Cruz MD   Hosp Day # 6 PCP Johnathon Rodriguez DO     Subjective:  Pt stated to have headache since last evening. Denies any chest pain or SOB.     Objective:  /58 (BP Location: Left arm)   Pulse 60   Temp 97.8 °F (36.6 °C) (Oral)   Resp 18   Ht 5' 5\" (1.651 m)   Wt 129 lb 4.8 oz (58.7 kg)   SpO2 98%   BMI 21.52 kg/m²     Telemetry: AV paced      Intake/Output:    Intake/Output Summary (Last 24 hours) at 2024 0940  Last data filed at 2024 0600  Gross per 24 hour   Intake 1035.75 ml   Output 825 ml   Net 210.75 ml       Last 3 Weights   24 0440 129 lb 4.8 oz (58.7 kg)   24 0631 128 lb 8 oz (58.3 kg)   24 0534 127 lb (57.6 kg)   24 0642 130 lb 8 oz (59.2 kg)   24 0500 124 lb 12.8 oz (56.6 kg)   24 0504 123 lb 10.9 oz (56.1 kg)   24 2241 123 lb 14.4 oz (56.2 kg)   24 1724 125 lb 10.6 oz (57 kg)   24 0633 127 lb (57.6 kg)   24 0510 119 lb 8 oz (54.2 kg)   24 0500 129 lb 12.8 oz (58.9 kg)   24 1104 126 lb (57.2 kg)       Labs:  Recent Labs   Lab 24  0548 24  0539 24  0457   GLU 86 94 91   BUN 44* 49* 42*   CREATSERUM 2.41* 2.39* 2.07*   EGFRCR 20* 20* 24*   CA 8.7 9.1 8.9   * 135* 136   K 3.9 4.0 3.8    104 105   CO2 19.0* 21.0 21.0     Recent Labs   Lab 24  1750 24  0348 24  0543 24  0548 24  1921 24  0539 24  0457   RBC 2.69* 2.41*   < > 2.21*  --  2.68* 2.66*   HGB 8.3* 7.4*   < > 7.0* 8.8* 8.4* 8.2*   HCT 27.7* 23.7*   < > 22.4* 27.5* 26.1* 26.3*   .0* 98.3   < > 101.4*  --  97.4 98.9   MCH 30.9 30.7   < > 31.7  --  31.3 30.8   MCHC 30.0* 31.2   < > 31.3  --  32.2 31.2   RDW 18.7* 17.7*   < > 19.9*  --  21.0* 21.4*   NEPRELIM 3.67 3.33  --   --   --   --   --    WBC 5.3 5.0   < > 5.0  --  7.9 9.1    .0 273.0   < > 206.0  --  281.0 308.0    < > = values in this interval not displayed.         Recent Labs   Lab 05/24/24 1936   [facility] 24     Lab Results   Component Value Date    INR 1.25 (H) 02/09/2024    INR 1.60 (H) 01/15/2024    INR 2.60 (H) 01/14/2024       Diagnostics:     Review of Systems   Respiratory: Negative.     Cardiovascular: Negative.    Musculoskeletal:  Positive for joint pain.   Neurological:  Positive for headaches. Negative for dizziness, tingling and tremors.         Physical Exam:    Physical Exam  Vitals reviewed.   Constitutional:       General: She is not in acute distress.  Neck:      Vascular: No JVD.   Cardiovascular:      Rate and Rhythm: Normal rate and regular rhythm.      Pulses: Normal pulses.           Radial pulses are 2+ on the right side and 2+ on the left side.        Dorsalis pedis pulses are 2+ on the right side and 2+ on the left side.      Heart sounds: S1 normal and S2 normal. Murmur heard.      Systolic murmur is present with a grade of 2/6.      No friction rub. No gallop.   Pulmonary:      Effort: Pulmonary effort is normal. No respiratory distress.      Breath sounds: Decreased air movement present. No wheezing, rhonchi or rales.      Comments: Fine crackles scattered  Musculoskeletal:      Cervical back: Normal range of motion and neck supple.      Right lower leg: No edema.      Left lower leg: No edema.   Skin:     General: Skin is warm and dry.   Neurological:      Mental Status: She is alert and oriented to person, place, and time.         Medications:   furosemide  20 mg Intravenous Once    polyethylene glycol (PEG 3350)  17 g Oral Daily    sennosides  8.6 mg Oral BID    gabapentin  100 mg Oral TID    lidocaine-menthol  1 patch Transdermal Once    [Held by provider] furosemide  40 mg Oral BID (Diuretic)    lidocaine-menthol  1 patch Transdermal Daily    amiodarone  200 mg Oral Daily    [Held by provider] carvedilol  3.125 mg Oral BID with meals     folic acid  800 mcg Oral Daily    mirtazapine  7.5 mg Oral Nightly    pantoprazole  40 mg Oral BID AC    [Held by provider] sacubitril-valsartan  1 tablet Oral BID      milrinone in dextrose 5% 0.375 mcg/kg/min (05/30/24 0004)       Assessment/Plan:    Pt was recently admitted for hypotension and dehydration, discharged off diuretics, admitted on 5/25 with fluid overload      Acute on chronic HFrEF, NICM  - echo from 2/24 with LVEF 15-20%  - s/p CD/ICD  - chest xray 5/24/24 moderate pulmonary edema   - BNP more than 5000 on admission  - pt was on bipap, now on room air   - I&O noted with minimal urine output  - GDMT BB, (ARNI, lasix on hold due to elevated creatinine)    - pt was stared on milrinone gtt yesterday to help with renal perfusion,      Valvular dysfunction  - severe TR   - h/o bioprosthetic mitral valve replacement     Atrial flutter/Fibrillation  - Tele AV paced  - on amiodarone  - s/p watchman device     CKD 3  - baseline creatinine of 1.4-1.6  - Serum creatinine stable at 2.7  - nephrology following       Plan:  - Pt with complaints of headache, will reduce the dose of milrinone gtt  - blood pressure stable on milrinone gtt, will add hydralazine and isosorbide for LV dysfunction  - Coreg on hold since pt is on milrinone gtt  - minimal urine output for the past 24 hours, will give her a dose of lasix 20mg today, if good output may plan on repeating it this pm  - continue amiodarone  - entresto on hold due to GEORGE  - daily weight and strict intake and output    Plan discussed with pt and RN     KODI Mclain  [facility]  5/30/2024  9:40 AM      Addendum:  Notified by RN that  unable to differentiate if she has headache or not because of the neck pain. will hold the isosorbide for now, if headache improves then will possibly restart it tomorrow.     Addendum:  Patient seen and examined  Agree with assessment and the management plan    Smooth Swain MD

## 2024-05-30 NOTE — CONSULTS
[facility]  Physical Medicine and Rehabilitation  Inpatient Consult Note    Requesting Physician: Dr. Cruz    Chief Complaint (Reason for Visit):    Chief Complaint   Patient presents with    Difficulty Breathing       History of Present Illness:  The patient is a 79 year old  female with a pertinent past medical history of seizure disorder, chronic CHF, placement of ICD approximately 1 year ago for reduced EF, current LVEF 15%, status post bioprosthetic MVR, CKD, RA, A-fib with plans per cardiology for Watchman device, hypertension who presents for a COPD and CHF exacerbation.  We were consulted to evaluate her neck pain.  Margaret has seen Dr. Lee in the past.  Her last visit with him was on 5/2/2024 where he recommended medial branch blocks to the cervical spine.  Today she presents with 10 out of 10 bilateral neck pain without radicular symptoms that began last night while she was laying in bed in the hospital.  Her symptoms are aggravated with rotation or side bending.  She has been using hot packs.  She has also tried Toradol, gabapentin, lidocaine patch, Norco 5/325 2 tablets every 4 hours as needed for pain, Flexeril 5 mg 3 times per day.  She has found these to be helpful.  Is also been seen by physical therapy and Occupational Therapy.  Neurosurgery was consulted and she is currently scheduled for an MRI of the cervical spine.    PAST MEDICAL HISTORY:   Past Medical History:    Acute kidney insufficiency    Anemia    Arrhythmia    CHF (congestive heart failure) (HCC)    CKD (chronic kidney disease) stage 3, GFR 30-59 ml/min (HCC)    Deep vein thrombosis (HCC)    Essential hypertension    History of blood transfusion    Rheumatoid arthritis (HCC)    Seizure disorder (HCC)       PAST SURGICAL HISTORY:   Past Surgical History:   Procedure Laterality Date    Colonoscopy N/A 01/17/2024    Dr. Rios    Colonoscopy N/A 1/17/2024    Procedure: COLONOSCOPY;  Surgeon: Zoraida Rios MD;  Location:  [facility] ENDOSCOPY    Egd N/A 01/17/2024    Dr. Rios    Other surgical history  2017    Heart Surgery     Other surgical history  2020    Bilateral shoulder replacement         FAMILY HISTORY:   No family history on file.    SOCIAL HISTORY:   Social History     Occupational History    Not on file   Tobacco Use    Smoking status: Former     Current packs/day: 0.00     Types: Cigarettes     Quit date: 2014     Years since quitting: 10.4    Smokeless tobacco: Never   Vaping Use    Vaping status: Never Used   Substance and Sexual Activity    Alcohol use: Never    Drug use: Never    Sexual activity: Not on file       CURRENT MEDICATIONS:   No current outpatient medications on file.        ALLERGIES:   Allergies   Allergen Reactions    Lisinopril Coughing         REVIEW OF SYSTEMS:   Review of Systems   Constitutional: Negative.    HENT: Negative.    Eyes: Negative.    Respiratory: Negative.    Cardiovascular: Negative.    Gastrointestinal: Negative.    Genitourinary: Negative.    Musculoskeletal: As per HPI   Skin: Negative.    Neurological: As per HPI  Endo/Heme/Allergies: Negative.    Psychiatric/Behavioral: Negative.      All other systems reviewed and are negative. Pertinent positives and negatives noted in the HPI.        PHYSICAL EXAM:   /52 (BP Location: Left arm)   Pulse 60   Temp 98 °F (36.7 °C)   Resp 20   Ht 65\"   Wt 129 lb 4.8 oz (58.7 kg)   SpO2 97%   BMI 21.52 kg/m²     Body mass index is 21.52 kg/m².      General: No immediate distress  Head: Normocephalic/ Atraumatic  Eyes: Extra-occular movements intact.   Ears: No auricular hematoma or deformities  Mouth: No lesions or ulcerations  Heart: peripheral pulses intact. Normal capillary refill.   Lungs: Non-labored respirations  Abdomen: No abdominal guarding  Extremities: No lower extremity edema bilaterally   Skin: No lesions noted.   Cognition: alert & oriented x 3, attentive, able to follow 2 step commands, comprehention intact, spontaneous speech  intact    Motor:    Musculoskeletal:    CERVICAL SPINE:  Inspection: no erythema, swelling, or obvious deformity  Palpation: Tender to palpation over the left upper and middle cervical facet region with a Lidoderm patch on.  ROM: intact to all planes of motion of cervical spine including side-bend bilaterally, rotation bilaterally, flexion, and extension   Strength: 5/5 in all myotomes of the BILATERAL upper extremities except 4 out of 5 during bilateral wrist extension  Sensation: Intact to light touch in all dermatomes of the BILATERAL upper extremities   Reflexes: 1/4 at C5, C6, C7 with a negative Mcallister's sign  Spurling Test: Positive for pain in the cervical spine      Gait:  Not tested    Data  Admission on 05/15/2024, Discharged on 05/18/2024   Component Date Value Ref Range Status    Ventricular rate 05/15/2024 64  BPM Final    Atrial rate 05/15/2024 64  BPM Final    P-R Interval 05/15/2024 134  ms Final    QRS Duration 05/15/2024 192  ms Final    Q-T Interval 05/15/2024 568  ms Final    QTC Calculation (Bezet) 05/15/2024 585  ms Final    P Axis 05/15/2024 62  degrees Final    R Axis 05/15/2024 -85  degrees Final    T Axis 05/15/2024 90  degrees Final    Glucose 05/15/2024 92  70 - 99 mg/dL Final    Sodium 05/15/2024 132 (L)  136 - 145 mmol/L Final    Potassium 05/15/2024 4.7  3.5 - 5.1 mmol/L Final    Chloride 05/15/2024 100  98 - 112 mmol/L Final    CO2 05/15/2024 25.0  21.0 - 32.0 mmol/L Final    Anion Gap 05/15/2024 7  0 - 18 mmol/L Final    BUN 05/15/2024 52 (H)  9 - 23 mg/dL Final    Creatinine 05/15/2024 2.28 (H)  0.55 - 1.02 mg/dL Final    BUN/CREA Ratio 05/15/2024 22.8 (H)  10.0 - 20.0 Final    Calcium, Total 05/15/2024 9.3  8.7 - 10.4 mg/dL Final    Calculated Osmolality 05/15/2024 288  275 - 295 mOsm/kg Final    eGFR-Cr 05/15/2024 21 (L)  >=60 mL/min/1.73m2 Final    Urine Color 05/15/2024 Light-Yellow  Yellow Final    Clarity Urine 05/15/2024 Clear  Clear Final    Spec Gravity 05/15/2024 1.010   1.005 - 1.030 Final    Glucose Urine 05/15/2024 Normal  Normal mg/dL Final    Bilirubin Urine 05/15/2024 Negative  Negative Final    Ketones Urine 05/15/2024 Negative  Negative mg/dL Final    Blood Urine 05/15/2024 Negative  Negative Final    pH Urine 05/15/2024 6.5  5.0 - 8.0 Final    Protein Urine 05/15/2024 Negative  Negative mg/dL Final    Urobilinogen Urine 05/15/2024 Normal  Normal Final    Nitrite Urine 05/15/2024 Negative  Negative Final    Leukocyte Esterase Urine 05/15/2024 Negative  Negative Final    Microscopic 05/15/2024 Microscopic not indicated   Final    AST 05/15/2024 17  <=34 U/L Final    ALT 05/15/2024 9 (L)  10 - 49 U/L Final    Alkaline Phosphatase 05/15/2024 168 (H)  55 - 142 U/L Final    Bilirubin, Total 05/15/2024 0.5  0.2 - 1.1 mg/dL Final    Bilirubin, Direct 05/15/2024 0.4 (H)  <=0.3 mg/dL Final    Total Protein 05/15/2024 7.5  5.7 - 8.2 g/dL Final    Albumin 05/15/2024 3.9  3.2 - 4.8 g/dL Final    WBC 05/15/2024 4.7  4.0 - 11.0 x10(3) uL Final    RBC 05/15/2024 2.76 (L)  3.80 - 5.30 x10(6)uL Final    HGB 05/15/2024 8.4 (L)  12.0 - 16.0 g/dL Final    HCT 05/15/2024 27.2 (L)  35.0 - 48.0 % Final    MCV 05/15/2024 98.6  80.0 - 100.0 fL Final    MCH 05/15/2024 30.4  26.0 - 34.0 pg Final    MCHC 05/15/2024 30.9 (L)  31.0 - 37.0 g/dL Final    RDW-SD 05/15/2024 62.3 (H)  35.1 - 46.3 fL Final    RDW 05/15/2024 18.0 (H)  11.0 - 15.0 % Final    PLT 05/15/2024 200.0  150.0 - 450.0 10(3)uL Final    Neutrophil Absolute Prelim 05/15/2024 3.11  1.50 - 7.70 x10 (3) uL Final    Neutrophil Absolute 05/15/2024 3.11  1.50 - 7.70 x10(3) uL Final    Lymphocyte Absolute 05/15/2024 0.85 (L)  1.00 - 4.00 x10(3) uL Final    Monocyte Absolute 05/15/2024 0.46  0.10 - 1.00 x10(3) uL Final    Eosinophil Absolute 05/15/2024 0.19  0.00 - 0.70 x10(3) uL Final    Basophil Absolute 05/15/2024 0.02  0.00 - 0.20 x10(3) uL Final    Immature Granulocyte Absolute 05/15/2024 0.02  0.00 - 1.00 x10(3) uL Final    Neutrophil %  05/15/2024 66.9  % Final    Lymphocyte % 05/15/2024 18.3  % Final    Monocyte % 05/15/2024 9.9  % Final    Eosinophil % 05/15/2024 4.1  % Final    Basophil % 05/15/2024 0.4  % Final    Immature Granulocyte % 05/15/2024 0.4  % Final    Glucose 05/16/2024 100 (H)  70 - 99 mg/dL Final    Sodium 05/16/2024 133 (L)  136 - 145 mmol/L Final    Potassium 05/16/2024 5.7 (H)  3.5 - 5.1 mmol/L Final    Chloride 05/16/2024 103  98 - 112 mmol/L Final    CO2 05/16/2024 25.0  21.0 - 32.0 mmol/L Final    Anion Gap 05/16/2024 5  0 - 18 mmol/L Final    BUN 05/16/2024 51 (H)  9 - 23 mg/dL Final    Creatinine 05/16/2024 2.11 (H)  0.55 - 1.02 mg/dL Final    BUN/CREA Ratio 05/16/2024 24.2 (H)  10.0 - 20.0 Final    Calcium, Total 05/16/2024 8.8  8.7 - 10.4 mg/dL Final    Calculated Osmolality 05/16/2024 290  275 - 295 mOsm/kg Final    eGFR-Cr 05/16/2024 24 (L)  >=60 mL/min/1.73m2 Final    WBC 05/16/2024 5.2  4.0 - 11.0 x10(3) uL Final    RBC 05/16/2024 2.45 (L)  3.80 - 5.30 x10(6)uL Final    HGB 05/16/2024 7.5 (L)  12.0 - 16.0 g/dL Final    HCT 05/16/2024 24.0 (L)  35.0 - 48.0 % Final    MCV 05/16/2024 98.0  80.0 - 100.0 fL Final    MCH 05/16/2024 30.6  26.0 - 34.0 pg Final    MCHC 05/16/2024 31.3  31.0 - 37.0 g/dL Final    RDW-SD 05/16/2024 62.1 (H)  35.1 - 46.3 fL Final    RDW 05/16/2024 18.0 (H)  11.0 - 15.0 % Final    PLT 05/16/2024 180.0  150.0 - 450.0 10(3)uL Final    Neutrophil Absolute Prelim 05/16/2024 3.74  1.50 - 7.70 x10 (3) uL Final    Neutrophil Absolute 05/16/2024 3.74  1.50 - 7.70 x10(3) uL Final    Lymphocyte Absolute 05/16/2024 0.79 (L)  1.00 - 4.00 x10(3) uL Final    Monocyte Absolute 05/16/2024 0.46  0.10 - 1.00 x10(3) uL Final    Eosinophil Absolute 05/16/2024 0.16  0.00 - 0.70 x10(3) uL Final    Basophil Absolute 05/16/2024 0.03  0.00 - 0.20 x10(3) uL Final    Immature Granulocyte Absolute 05/16/2024 0.03  0.00 - 1.00 x10(3) uL Final    Neutrophil % 05/16/2024 71.7  % Final    Lymphocyte % 05/16/2024 15.2  % Final     Monocyte % 05/16/2024 8.8  % Final    Eosinophil % 05/16/2024 3.1  % Final    Basophil % 05/16/2024 0.6  % Final    Immature Granulocyte % 05/16/2024 0.6  % Final    Iron 05/16/2024 51  50 - 170 ug/dL Final    Transferrin 05/16/2024 147 (L)  250 - 380 mg/dL Final    Total Iron Binding Capacity 05/16/2024 219 (L)  250 - 425 ug/dL Final    % Saturation 05/16/2024 23  15 - 50 % Final    Glucose 05/17/2024 98  70 - 99 mg/dL Final    Sodium 05/17/2024 135 (L)  136 - 145 mmol/L Final    Potassium 05/17/2024 5.0  3.5 - 5.1 mmol/L Final    Chloride 05/17/2024 108  98 - 112 mmol/L Final    CO2 05/17/2024 23.0  21.0 - 32.0 mmol/L Final    Anion Gap 05/17/2024 4  0 - 18 mmol/L Final    BUN 05/17/2024 47 (H)  9 - 23 mg/dL Final    Creatinine 05/17/2024 1.63 (H)  0.55 - 1.02 mg/dL Final    BUN/CREA Ratio 05/17/2024 28.8 (H)  10.0 - 20.0 Final    Calcium, Total 05/17/2024 8.7  8.7 - 10.4 mg/dL Final    Calculated Osmolality 05/17/2024 292  275 - 295 mOsm/kg Final    eGFR-Cr 05/17/2024 32 (L)  >=60 mL/min/1.73m2 Final    WBC 05/17/2024 5.2  4.0 - 11.0 x10(3) uL Final    RBC 05/17/2024 2.32 (L)  3.80 - 5.30 x10(6)uL Final    HGB 05/17/2024 7.1 (L)  12.0 - 16.0 g/dL Final    HCT 05/17/2024 23.0 (L)  35.0 - 48.0 % Final    MCV 05/17/2024 99.1  80.0 - 100.0 fL Final    MCH 05/17/2024 30.6  26.0 - 34.0 pg Final    MCHC 05/17/2024 30.9 (L)  31.0 - 37.0 g/dL Final    RDW-SD 05/17/2024 63.7 (H)  35.1 - 46.3 fL Final    RDW 05/17/2024 19.0 (H)  11.0 - 15.0 % Final    PLT 05/17/2024 197.0  150.0 - 450.0 10(3)uL Final    Neutrophil Absolute Prelim 05/17/2024 4.04  1.50 - 7.70 x10 (3) uL Final    Neutrophil Absolute 05/17/2024 4.04  1.50 - 7.70 x10(3) uL Final    Lymphocyte Absolute 05/17/2024 0.75 (L)  1.00 - 4.00 x10(3) uL Final    Monocyte Absolute 05/17/2024 0.24  0.10 - 1.00 x10(3) uL Final    Eosinophil Absolute 05/17/2024 0.15  0.00 - 0.70 x10(3) uL Final    Basophil Absolute 05/17/2024 0.02  0.00 - 0.20 x10(3) uL Final    Immature  Granulocyte Absolute 05/17/2024 0.03  0.00 - 1.00 x10(3) uL Final    Neutrophil % 05/17/2024 77.2  % Final    Lymphocyte % 05/17/2024 14.3  % Final    Monocyte % 05/17/2024 4.6  % Final    Eosinophil % 05/17/2024 2.9  % Final    Basophil % 05/17/2024 0.4  % Final    Immature Granulocyte % 05/17/2024 0.6  % Final    Glucose 05/18/2024 105 (H)  70 - 99 mg/dL Final    Sodium 05/18/2024 138  136 - 145 mmol/L Final    Potassium 05/18/2024 4.9  3.5 - 5.1 mmol/L Final    Chloride 05/18/2024 109  98 - 112 mmol/L Final    CO2 05/18/2024 23.0  21.0 - 32.0 mmol/L Final    Anion Gap 05/18/2024 6  0 - 18 mmol/L Final    BUN 05/18/2024 41 (H)  9 - 23 mg/dL Final    Creatinine 05/18/2024 1.48 (H)  0.55 - 1.02 mg/dL Final    BUN/CREA Ratio 05/18/2024 27.7 (H)  10.0 - 20.0 Final    Calcium, Total 05/18/2024 9.3  8.7 - 10.4 mg/dL Final    Calculated Osmolality 05/18/2024 296 (H)  275 - 295 mOsm/kg Final    eGFR-Cr 05/18/2024 36 (L)  >=60 mL/min/1.73m2 Final    Magnesium 05/18/2024 1.9  1.6 - 2.6 mg/dL Final    WBC 05/18/2024 4.3  4.0 - 11.0 x10(3) uL Final    RBC 05/18/2024 2.28 (L)  3.80 - 5.30 x10(6)uL Final    HGB 05/18/2024 7.0 (L)  12.0 - 16.0 g/dL Final    HCT 05/18/2024 22.6 (L)  35.0 - 48.0 % Final    MCV 05/18/2024 99.1  80.0 - 100.0 fL Final    MCH 05/18/2024 30.7  26.0 - 34.0 pg Final    MCHC 05/18/2024 31.0  31.0 - 37.0 g/dL Final    RDW-SD 05/18/2024 62.0 (H)  35.1 - 46.3 fL Final    RDW 05/18/2024 17.8 (H)  11.0 - 15.0 % Final    PLT 05/18/2024 215.0  150.0 - 450.0 10(3)uL Final    Neutrophil Absolute Prelim 05/18/2024 2.96  1.50 - 7.70 x10 (3) uL Final    Neutrophil Absolute 05/18/2024 2.96  1.50 - 7.70 x10(3) uL Final    Lymphocyte Absolute 05/18/2024 0.77 (L)  1.00 - 4.00 x10(3) uL Final    Monocyte Absolute 05/18/2024 0.24  0.10 - 1.00 x10(3) uL Final    Eosinophil Absolute 05/18/2024 0.24  0.00 - 0.70 x10(3) uL Final    Basophil Absolute 05/18/2024 0.02  0.00 - 0.20 x10(3) uL Final    Immature Granulocyte Absolute  05/18/2024 0.02  0.00 - 1.00 x10(3) uL Final    Neutrophil % 05/18/2024 69.7  % Final    Lymphocyte % 05/18/2024 18.1  % Final    Monocyte % 05/18/2024 5.6  % Final    Eosinophil % 05/18/2024 5.6  % Final    Basophil % 05/18/2024 0.5  % Final    Immature Granulocyte % 05/18/2024 0.5  % Final    MD Blood Smear Consult 05/18/2024    Final                    Value:  Evaluation of CBC with differential data and the peripheral blood smear demonstrates significant normochromic normocytic anemia with decreased absolute RBC count and moderate/grade 2 lymphocytopenia.    Red blood cells show anisopoikilocytosis with scattered round macrocytes, scattered ovalocytes, few target cells, rare teardrop forms, and minimal polychromasia.    Lymphocytes appear mature, polymorphous, and show no significant morphologic abnormalities.    No significant morphologic and/or parametric abnormalities are seen for the other leukocyte subsets or platelets.    Differential causes for an anemia of this type may include hemorrhage, chronic inflammatory disorders (rheumatoid arthritis, SLE, inflammatory bowel disease, sarcoidosis, trauma, etc.), chronic infections (HIV, other viral infections, tuberculosis, pyelonephritis, osteomyelitis, chronic fungal infections, subacute bacterial endocarditis, etc.), neoplasms (lymphoma, carcinomas, chronic leukemias and occasionally                           myelodysplastic syndrome), and end organ failure (chronic liver disease, endocrinopathies, etc.).     Differential considerations for lymphocytopenia include nutritional deficiencies (protein-calorie, zinc), infectious diseases (viral, tuberculosis, pneumonia, sepsis, other), autoimmune disorders (rheumatoid arthritis, SLE, myasthenia gravis), systemic diseases (burns, protein-losing enteropathy, sarcoidosis, renal failure, Hodgkin lymphoma), and iatrogenic causes (radiation therapy, chemotherapy, glucocorticoids, other drugs, anesthesia and surgery, and  thoracic duct drainage or rupture).     Clinical correlation and close clinical follow-up are recommended.    Reviewed by Philip Griffith M.D.       [facility] Lab Encounter on 04/23/2024   Component Date Value Ref Range Status    Glucose 04/23/2024 76  70 - 99 mg/dL Final    Sodium 04/23/2024 136  136 - 145 mmol/L Final    Potassium 04/23/2024 4.8  3.5 - 5.1 mmol/L Final    Chloride 04/23/2024 103  98 - 112 mmol/L Final    CO2 04/23/2024 25.0  21.0 - 32.0 mmol/L Final    Anion Gap 04/23/2024 8  0 - 18 mmol/L Final    BUN 04/23/2024 28 (H)  9 - 23 mg/dL Final    Creatinine 04/23/2024 1.34 (H)  0.55 - 1.02 mg/dL Final    BUN/CREA Ratio 04/23/2024 20.9 (H)  10.0 - 20.0 Final    Calcium, Total 04/23/2024 9.9  8.7 - 10.4 mg/dL Final    Calculated Osmolality 04/23/2024 286  275 - 295 mOsm/kg Final    eGFR-Cr 04/23/2024 41 (L)  >=60 mL/min/1.73m2 Final    Albumin 04/23/2024 4.0  3.2 - 4.8 g/dL Final    Phosphorus 04/23/2024 4.2  2.4 - 5.1 mg/dL Final    Microalbumin, Urine 04/23/2024 <0.30  mg/dL Final    Creatinine Ur Random 04/23/2024 11.50  mg/dL Final    Malb/Cre Calc 04/23/2024    Final    Urine Color 04/23/2024 Light-Yellow  Yellow Final    Clarity Urine 04/23/2024 Clear  Clear Final    Spec Gravity 04/23/2024 1.006  1.005 - 1.030 Final    Glucose Urine 04/23/2024 Normal  Normal mg/dL Final    Bilirubin Urine 04/23/2024 Negative  Negative Final    Ketones Urine 04/23/2024 Negative  Negative mg/dL Final    Blood Urine 04/23/2024 Negative  Negative Final    pH Urine 04/23/2024 6.5  5.0 - 8.0 Final    Protein Urine 04/23/2024 Negative  Negative mg/dL Final    Urobilinogen Urine 04/23/2024 Normal  Normal Final    Nitrite Urine 04/23/2024 Negative  Negative Final    Leukocyte Esterase Urine 04/23/2024 250 (A)  Negative Final    WBC Urine 04/23/2024 1-5  0 - 5 /HPF Final    RBC Urine 04/23/2024 0-2  0 - 2 /HPF Final    Bacteria Urine 04/23/2024 None Seen  None Seen /HPF Final    Squamous Epi. Cells 04/23/2024 Few (A)  None Seen  /HPF Final    Renal Tubular Epithelial Cells 04/23/2024 None Seen  None Seen /HPF Final    Transitional Cells 04/23/2024 None Seen  None Seen /HPF Final    Yeast Urine 04/23/2024 None Seen  None Seen /HPF Final    Iron 04/23/2024 41 (L)  50 - 170 ug/dL Final    Transferrin 04/23/2024 149 (L)  250 - 380 mg/dL Final    Total Iron Binding Capacity 04/23/2024 222 (L)  250 - 425 ug/dL Final    % Saturation 04/23/2024 18  15 - 50 % Final    THOMAS Screen With Reflex 04/23/2024 Negative  Negative Final    Sed Rate 04/23/2024 61 (H)  0 - 30 mm/Hr Final    Rheumatoid Factor 04/23/2024 72 (H)  <14 IU/mL Final    Complement C3 04/23/2024 140.7  90.0 - 170.0 mg/dL Final    Complement C4 04/23/2024 23.1  12.0 - 36.0 mg/dL Final    C-Reactive Protein 04/23/2024 5.10 (H)  <1.00 mg/dL Final    Total Protein 04/23/2024 7.2  5.7 - 8.2 g/dL Final    Albumin 04/23/2024 3.63 (L)  3.75 - 5.21 g/dL Final    Alpha-1-Globulins 04/23/2024 0.52 (H)  0.19 - 0.46 g/dL Final    Alpha-2-Globulins 04/23/2024 0.81  0.48 - 1.05 g/dL Final    Beta Globulins 04/23/2024 0.77  0.68 - 1.23 g/dL Final    Gamma Globulins 04/23/2024 1.47  0.62 - 1.70 g/dL Final    Albumin/Globulin Ratio 04/23/2024 1.02  1.00 - 2.00 Final    SPE Interpretation 04/23/2024    Final                    Value:Decreased albumin and mildly increased alpha-1 globulin      Decreased albumin levels may be seen in association with increased catabolism, decreased synthesis (liver diseases, congenital), renal loss (nephrotic syndrome), protein losing gastroenteropathies, loss into serous cavities (ascites, etc.), and cutaneous loss (thermal burns, eczematous lesions).     Increased alpha-1 globulin is most often seen in association with acute phase response of alpha-1 antitrypsin produced in response to elevated trypsin levels seen in acute inflammation.     Clinical correlation is recommended.       Reviewed By: 04/23/2024 Makayla Pat M.D.   Final    WBC 04/23/2024 5.4  4.0 - 11.0  x10(3) uL Final    RBC 04/23/2024 2.93 (L)  3.80 - 5.30 x10(6)uL Final    HGB 04/23/2024 8.8 (L)  12.0 - 16.0 g/dL Final    HCT 04/23/2024 28.3 (L)  35.0 - 48.0 % Final    MCV 04/23/2024 96.6  80.0 - 100.0 fL Final    MCH 04/23/2024 30.0  26.0 - 34.0 pg Final    MCHC 04/23/2024 31.1  31.0 - 37.0 g/dL Final    RDW-SD 04/23/2024 69.8 (H)  35.1 - 46.3 fL Final    RDW 04/23/2024 20.6 (H)  11.0 - 15.0 % Final    PLT 04/23/2024 172.0  150.0 - 450.0 10(3)uL Final    Neutrophil Absolute Prelim 04/23/2024 3.71  1.50 - 7.70 x10 (3) uL Final    Neutrophil Absolute 04/23/2024 3.71  1.50 - 7.70 x10(3) uL Final    Lymphocyte Absolute 04/23/2024 0.89 (L)  1.00 - 4.00 x10(3) uL Final    Monocyte Absolute 04/23/2024 0.37  0.10 - 1.00 x10(3) uL Final    Eosinophil Absolute 04/23/2024 0.30  0.00 - 0.70 x10(3) uL Final    Basophil Absolute 04/23/2024 0.05  0.00 - 0.20 x10(3) uL Final    Immature Granulocyte Absolute 04/23/2024 0.07  0.00 - 1.00 x10(3) uL Final    Neutrophil % 04/23/2024 68.8  % Final    Lymphocyte % 04/23/2024 16.5  % Final    Monocyte % 04/23/2024 6.9  % Final    Eosinophil % 04/23/2024 5.6  % Final    Basophil % 04/23/2024 0.9  % Final    Immature Granulocyte % 04/23/2024 1.3  % Final    Urine Culture 04/23/2024 10,000 - 50,000 CFU/ML Escherichia coli (A)   Final    RBC Morphology 04/23/2024 See morphology below (A)  Normal, Slide reviewed, see previous RBC morphology. Final    Platelet Morphology 04/23/2024 Normal  Normal Final    Macrocytosis 04/23/2024 1+    Final    Microcytosis 04/23/2024 1+    Final    Ovalocytes 04/23/2024 1+    Final    Tear Drop Cells 04/23/2024 1+    Final    Immunofixation 04/23/2024   No definitive evidence of monoclonal proteins.     Final    Reviewed By: 04/23/2024 Makayla Pat M.D.   Final    Immunoglobulin A 04/23/2024 224.10  40.00 - 350.00 mg/dL Final    Immunoglobulin G 04/23/2024 1,667 (H)  650 - 1,600 mg/dL Final    Immunoglobulin M 04/23/2024 207.1  50.0 - 300.0 mg/dL Final    Hospital Outpatient Visit on 04/11/2024   Component Date Value Ref Range Status    ISTAT Creatinine 04/11/2024 1.90 (H)  0.55 - 1.02 mg/dL Final    eGFR-Cr 04/11/2024 27 (L)  >=60 mL/min/1.73m2 Final   No results displayed because visit has over 200 results.      Telephone on 01/23/2024   Component Date Value Ref Range Status    WHITE BLOOD CELL COUNT 01/30/2024 3.1 (L)  3.8 - 10.8 Thousand/uL Final    RED BLOOD CELL COUNT 01/30/2024 3.30 (L)  3.80 - 5.10 Million/uL Final    HEMOGLOBIN 01/30/2024 9.9 (L)  11.7 - 15.5 g/dL Final    HEMATOCRIT 01/30/2024 31.5 (L)  35.0 - 45.0 % Final    MCV 01/30/2024 95.5  80.0 - 100.0 fL Final    MCH 01/30/2024 30.0  27.0 - 33.0 pg Final    MCHC 01/30/2024 31.4 (L)  32.0 - 36.0 g/dL Final    RDW 01/30/2024 17.9 (H)  11.0 - 15.0 % Final    PLATELET COUNT 01/30/2024 310  140 - 400 Thousand/uL Final    MPV 01/30/2024 10.9  7.5 - 12.5 fL Final    ABSOLUTE NEUTROPHILS 01/30/2024 1,776  1,500 - 7,800 cells/uL Final    ABSOLUTE LYMPHOCYTES 01/30/2024 1,107  850 - 3,900 cells/uL Final    ABSOLUTE MONOCYTES 01/30/2024 158 (L)  200 - 950 cells/uL Final    ABSOLUTE EOSINOPHILS 01/30/2024 40  15 - 500 cells/uL Final    ABSOLUTE BASOPHILS 01/30/2024 19  0 - 200 cells/uL Final    NEUTROPHILS 01/30/2024 57.3  % Final    LYMPHOCYTES 01/30/2024 35.7  % Final    MONOCYTES 01/30/2024 5.1  % Final    EOSINOPHILS 01/30/2024 1.3  % Final    BASOPHILS 01/30/2024 0.6  % Final   No results displayed because visit has over 200 results.      ]      Radiology Imaging:  PROCEDURE:  MRI SPINE CERVICAL (CPT=72141)     COMPARISON:  None.     INDICATIONS:  M48.02 Degenerative cervical spinal stenosis M47.812 Arthropathy of cervical facet joint M43.12 Anterolisthesis of cervical spine     TECHNIQUE:  Multiplanar T1 and T2 weighted images including fat suppression sequences.  Images acquired in sagittal and axial planes.       PATIENT STATED HISTORY: (As transcribed by Technologist)  Patient has neck pain with pain  to back of head for 1 year..         FINDINGS:    CERVICAL DISC LEVELS:  C2-C3:  Diffuse disc osteophyte complex does not cause any significant stenosis of central canal or foramina.  C3-C4:  There is diffuse disc osteophyte complex and there are bilateral uncovertebral joint osteophytes.  There is mild central canal stenosis.  There is mild bilateral foraminal stenosis.  C4-C5:  There is severe facet hypertrophy at this level.  There is 5 mm of anterolisthesis of C4 with respect to C5.  There is no central canal stenosis.  There is moderate bilateral foraminal stenosis.  C5-C6:  No significant disc/facet abnormality, spinal stenosis, or foraminal narrowing.  C6-C7:  There is diffuse disc osteophyte complex.  There is left uncovertebral joint osteophyte.  There is mild to moderate left foraminal stenosis.  There is no central canal stenosis.  C7-T1:  No significant disc/facet abnormality, spinal stenosis, or foraminal narrowing.     CRANIOCERVICAL AREA:  There is advanced degenerative change at the anterior atlantoaxial joint with reactive edema and sclerosis in the odontoid process and anterior arch of C1.  There is no abnormal thickening of the transverse ligament.  PARASPINAL AREA:  Normal with no visible mass.    BONY STRUCTURES:  There are endplate osteophytes at all levels.  There is again noted to be anterolisthesis of C4 with respect to C5.  CORD:  Normal caliber, contour, and signal intensity.                     Impression   CONCLUSION:    1. There is multilevel degenerative disc disease in cervical spine which is described in detail level by level above.  2. There is 5 mm of anterolisthesis of C4 with respect to C5 related to degenerative change of the facets.        Dictated by (CST): Alfred Wild MD on 2/13/2023 at 1:17 PM      Finalized by (CST): Alfred Wild MD on 2/13/2023 at 1:21 PM       Assessment  Margaret is a pleasant 79-year-old female who I am seeing for consultation as she has axial neck  pain without radicular symptoms.  On exam, she has tenderness to palpation mostly over the left cervical facets.  She has some weakness during wrist extension.  MRI of the cervical spine performed in February 2023 demonstrates multilevel spondylosis with facet arthropathy and a anterolisthesis of C4 on C5 leading to moderate foraminal narrowing.  At this time, I am recommending she use a Lidoderm patch, Flexeril 5 mg 3 times per day, Norco 5/325 1 to 2 tablets every 6 hours as needed for pain, and continue with physical therapy.  I have discussed the case with Dr. Lee and we can also consider medial branch blocks as an outpatient.  I would recommend x-rays of the cervical spine given her history of rheumatoid arthritis to evaluate for atlantoaxial instability.  An MRI of the cervical spine has already been ordered.  She has also been seen by neurosurgery.  I have reviewed those notes.  I will follow her peripherally but the plan should be to follow-up with us (Dr. Felicitas Lee) as an outpatient.                1. Acute respiratory failure with hypoxia (HCC)    2. Renal insufficiency    3. Hyperkalemia        Alex B. Behar MD, [facility]  Physical Medicine and Rehabilitation/Sports Medicine  [facility]

## 2024-05-30 NOTE — PROGRESS NOTES
[facility]     Gastroenterology Progress Note    Margaret Silverio Patient Status:  Inpatient    3/14/1945 MRN [id number]   Location [facility]5W Attending Regan Cruz MD   Hosp Day # 6 PCP Johnathon Rodriguez,        Subjective:   Patient feeling well today. She denies SOB. No BM last night or today.   No nausea or vomiting. No CP.  Tolerated diet.     Objective:   Blood pressure 124/74, pulse 73, temperature 97.2 °F (36.2 °C), temperature source Axillary, resp. rate 16, height 5' 5\" (1.651 m), weight 129 lb 4.8 oz (58.7 kg), SpO2 96%. Body mass index is 21.52 kg/m².    Gen: awake, alert patient, NAD  HEENT: EOMI, the sclera appears anicteric, oropharynx clear, mucus membranes appear moist  CV: RRR  Lung: no conversational dyspnea   Abdomen: soft NTND abdomen with NABS appreciated   Skin: dry, warm, no jaundice  Ext: no LE edema is evident  Neuro: Alert and interactive  Psych: calm, cooperative    Assessment and Plan:   Margaret Silverio is a 79 year old female w/ PMHx of HTN, CKD 3b, HFrEF (EF 15-20%) + grade 4 DD, s/p AICD, A.fib, s/p bioprosthetic MVR, RA, and h/o GIB/AVMs who presented to ER for SOB. Upon admission BNP found to be >5000. Patient treated with lasix. Patient then found to have GEORGE due to over diuresed. Chronic anemia had been treated with IV venofer and Epogen. GI consulted for anemia and hx of AVMs. Patient hemoglobin upon arrival 8.3, today down to 7.0. Patient today feels well. Notes her shortness of breath has improved, she notes still can have dyspnea when getting up to use the bathroom. She had small brown BM today. No melena or brbpr. No nausea or vomiting. Denies hematemesis. No GERD, dysphagia or globus. No constipation or diarrhea. Weight has been stable. Appetite has been good.      #acute on chronic anemia   -hx of GIB/AVM in the past  -no sign of overt GIB at this time  -SOB slowly improving, however hemoglobin continues to decline  -prior  EGD/cln 1/2024 notable for transverse cln AVM, no malignancy   -can consider possible repeat EGD/cln +/- VCE, however plan to continue monitory output and hemoglobin prior to endoscopic evaluation   -Responded well to 1 unit PRBCs with Hgb stable at 8.4 and no overt GIB.     #acute hypoxic respiratory failure  -cardiology and pulmonology following  -felt due to chronic HFrEF and severe anemia  -management per cardiology      #GEORGE   -diuretics on hold  -management per renal     Recommend:  -Miralax for constipation  -Diet as tolerated  -Empiric PPI BID  -IV venofer  -Trend hemoglobin, transfuse to goal >7  -Will reserve endoscopic evaluation for drop in hemoglobin requiring transfusion or overt GIB.    Pt is stable from a GI standpoint.  GI will sign off at this time.  Please, reach out to our team if new GI concerns arise.  Thank you for the opportunity to participate in this patient's care.    Case discussed with Nedra Resendez MD.     Tiffany Reynolds PA-C  [facility]  5/30/2024      Results:     Lab Results   Component Value Date    WBC 9.1 05/30/2024    HGB 8.2 (L) 05/30/2024    HCT 26.3 (L) 05/30/2024    .0 05/30/2024    CREATSERUM 2.07 (H) 05/30/2024    BUN 42 (H) 05/30/2024     05/30/2024    K 3.8 05/30/2024     05/30/2024    CO2 21.0 05/30/2024    GLU 91 05/30/2024    CA 8.9 05/30/2024    ALB 3.6 05/30/2024    ALKPHO 209 (H) 05/25/2024    BILT 1.1 05/25/2024    TP 6.8 05/25/2024    AST 32 05/25/2024    ALT 24 05/25/2024    PTT 35.2 02/09/2024    INR 1.25 (H) 02/09/2024    T4F 1.2 02/03/2024    TSH 10.681 (H) 02/03/2024    LIP 32 01/13/2024    ESRML 61 (H) 04/23/2024    CRP 5.10 (H) 04/23/2024    MG 2.2 05/25/2024    PHOS 3.7 05/30/2024    B12 >2,000 (H) 02/03/2024       No results found.

## 2024-05-30 NOTE — PROGRESS NOTES
[facility]  part of [facility]    Progress Note    Margaret Silverio Patient Status:  Inpatient    3/14/1945 MRN [id number]   Location [facility]5W Attending Regan Cruz MD   Hosp Day # 6 PCP Johnathon Rodriguez DO     Chief Complaint:   Chief Complaint   Patient presents with    Difficulty Breathing       Subjective:   Margaret Silverio Had excruciating pain in her neck radating to her head last night sharp shooting pain similar to her chronic cervical radiculopathy but much more intense. She denies any SOB. No CP.   Objective:   Objective:    Blood pressure 129/58, pulse 60, temperature 97.8 °F (36.6 °C), temperature source Oral, resp. rate 18, height 5' 5\" (1.651 m), weight 129 lb 4.8 oz (58.7 kg), SpO2 98%.    Physical Exam:    General: No acute distress.   Respiratory: Clear to auscultation bilaterally. No wheezes. No rhonchi.  Cardiovascular: S1, S2. Regular rate and rhythm. No murmurs, rubs or gallops.   Abdomen: Soft, nontender, nondistended.  Positive bowel sounds. No rebound or guarding.  Neurologic: No focal neurological deficits.   Musculoskeletal: Moves all extremities.  Extremities: No edema.      Results:   Results:    Labs:  Recent Labs   Lab 24  0543 24  0548 24  1921 24  0539 24  0457   WBC 5.0 5.4 5.0  --  7.9 9.1   HGB 7.4* 7.3* 7.0* 8.8* 8.4* 8.2*   MCV 98.3 96.5 101.4*  --  97.4 98.9   .0 268.0 206.0  --  281.0 308.0       Recent Labs   Lab 24  193248 24  0629 24  0954 24  0548 24  0539 24  0457   GLU 91 79 100*   < > 86 94 91   BUN 30* 26* 37*   < > 44* 49* 42*   CREATSERUM 1.47* 1.53* 2.18*   < > 2.41* 2.39* 2.07*   CA 9.4 9.1 8.9   < > 8.7 9.1 8.9   ALB 4.2 3.6 3.6  --   --   --  3.6   * 136 133*   < > 134* 135* 136   K 5.9* 5.1 4.7   < > 3.9 4.0 3.8    107 104   < > 104 104 105   CO2 20.0* 17.0* 19.0*   < > 19.0* 21.0 21.0   ALKPHO 244*  209*  --   --   --   --   --    AST 41* 32  --   --   --   --   --    ALT 29 24  --   --   --   --   --    BILT 1.0 1.1  --   --   --   --   --    TP 7.8 6.8  --   --   --   --   --     < > = values in this interval not displayed.       Estimated Creatinine Clearance: 20.2 mL/min (A) (based on SCr of 2.07 mg/dL (H)).    No results for input(s): \"PTP\", \"INR\" in the last 168 hours.         Culture:  No results found for this visit on 05/24/24.    Cardiac  No results for input(s): \"TROP\", \"PBNP\" in the last 168 hours.      Imaging: Imaging data reviewed in Epic.  No results found.    Medications:    isosorbide dinitrate  20 mg Oral TID (Nitrates)    hydrALAZINE  25 mg Oral Q8H BROOKLYNN    polyethylene glycol (PEG 3350)  17 g Oral Daily    sennosides  8.6 mg Oral BID    gabapentin  100 mg Oral TID    [Held by provider] furosemide  40 mg Oral BID (Diuretic)    lidocaine-menthol  1 patch Transdermal Daily    amiodarone  200 mg Oral Daily    [Held by provider] carvedilol  3.125 mg Oral BID with meals    folic acid  800 mcg Oral Daily    mirtazapine  7.5 mg Oral Nightly    pantoprazole  40 mg Oral BID AC    [Held by provider] sacubitril-valsartan  1 tablet Oral BID         Assessment and Plan:   Assessment & Plan:        Acute hypoxic respiratory failure   Secondary to Pulmonary edema from Acute CHF and anemia.   Initially on NIPPV now on RA.   CXR pulmonary edema. BNP > 5000  Was diuresed with lasix however developed GEORGE. Now lasix on hold   Pulmonary and card on consult.   Dana Blackman   Acute on chronic HFrEF, NICM   ECHO from 2/24 LVEF 15-20%   S/p AICD   GDMT: coreg, hold entresto, hydralazine.   Lasix 20mg IV x1 today   Cont milrinone to increase CO/Renal perfusion.   Was diuresed now lasix on hold with GEORGE.   GEORGE on CKD III   Baseline creat 1.4-1.6   UA bland   Likely secondary to overdiuresis vs cardiorenal syndrome from poor EF.    Renal on consult.   Hold entresto   Urine sodium 17   Rpt labs in AM.   Lasix  20mg IV x 1 today   Cont milrinone. Will prob need Bumex 1mg daily on discharge.   Acute anemia  Baseline Hb 10-12 per EMR back in Feb  Iron panel ok.   Had Colonoscopy Jan 2024 found to have AVM's   No signs of blood loss at this time.   GI on consult  No overt bleeding. H/H improved today   Transfuse 1 unit PRBC   5.     Acute on Chronic neck pain with radiculopathy   Gabapentin 300mg BID, norco, flexeril. Lidoderm patch   Physiatry consult. ? GIUSEPPE   MRI C spine.   Neurosurgery consult  AVOID NSAIDS.     >55min spent, >50% spent counseling and coordinating care in the form of educating pt/family and d/w consultants and staff. Most of the time spent discussing the above plan.        Plan of care discussed with patient or family at bedside.    Regan Cruz MD  Hospitalist          Supplementary Documentation:     Quality:  DVT Prophylaxis: SCD hold heparin   CODE status: Full   Dispo: per clinical course           Estimated date of discharge: TBD  Discharge is dependent on: clinical stability  At this point Ms. Silverio is expected to be discharge to: home

## 2024-05-30 NOTE — CONSULTS
[facility] Neurosurgery Consult    Margaret Silverio Patient Status:  Inpatient    3/14/1945 MRN [id number]   Location [facility]5W Attending Regan Cruz MD   Hosp Day # 6 PCP Johnathon Rodriguez DO       REASON FOR CONSULTATION: Neck pain      HISTORY OF PRESENT ILLNESS:  Margaret Silverio is a(n) 79 year old female with a pertinent past medical history of seizure disorder, chronic CHF, placement of ICD approximately 1 year ago for reduced EF, current LVEF 15%, status post bioprosthetic MVR, CKD, RA, A-fib with plans per cardiology for Watchman device, hypertension who had presented to the ED on  for shortness of breath.  Pulmonary, cardiovascular and nephrology was consulted.    Patient with acute on chronic heart failure along with acute respiratory failure with hypoxia. Patient also with hypotension and GEORGE/CKD.    She is being followed outpatient by physiatry for cervical radiculopathy.  She is status post the below injections:  S/p 4/10/23: C7-T1 interlaminar epidural steroid injection  S/p 3/6/2023: Left C3-4, C4-5 and C5-6 joint injection    She last followed up with physiatry in May who had recommended medial branch block procedure for the cervical facet joints for diagnostic procedure. Patient has been hospitalized multiple times lately, which led her to be unable to complete the above recommended procedure.     The patient endorses that she has not had radiating upper extremity pain.  No relief with prior injections.  She endorses no hand numbness, tingling or weakness.  She does have a pertinent history of RA with significant deformities of the hands.  She endorses no low back pain or radiating lower extremity pain.  No leg numbness, tingling or weakness.  No gait stability.  No bladder/bowel incontinence/retention.    If recommended, she is open to surgical intervention.      PAST MEDICAL HISTORY:  Past Medical History:    Acute kidney insufficiency    Anemia    Arrhythmia    CHF  (congestive heart failure) (HCC)    CKD (chronic kidney disease) stage 3, GFR 30-59 ml/min (HCC)    Deep vein thrombosis (HCC)    Essential hypertension    History of blood transfusion    Rheumatoid arthritis (HCC)    Seizure disorder (HCC)       PAST SURGICAL HISTORY:  Past Surgical History:   Procedure Laterality Date    Colonoscopy N/A 01/17/2024    Dr. Rios    Colonoscopy N/A 1/17/2024    Procedure: COLONOSCOPY;  Surgeon: Zoraida Rios MD;  Location: [facility] ENDOSCOPY    Egd N/A 01/17/2024    Dr. Rios    Other surgical history  2017    Heart Surgery     Other surgical history  2020    Bilateral shoulder replacement        FAMILY HISTORY:  family history is not on file.    SOCIAL HISTORY:   reports that she quit smoking about 10 years ago. Her smoking use included cigarettes. She has never used smokeless tobacco. She reports that she does not drink alcohol and does not use drugs.    ALLERGIES:  Allergies   Allergen Reactions    Lisinopril Coughing       MEDICATIONS:  Medications Prior to Admission   Medication Sig Dispense Refill Last Dose    mirtazapine 7.5 MG Oral Tab Take 1 tablet (7.5 mg total) by mouth nightly. 30 tablet 0 5/23/2024    PANTOPRAZOLE 40 MG Oral Tab EC TAKE 1 TABLET(40 MG) BY MOUTH TWICE DAILY BEFORE MEALS 180 tablet 0 5/24/2024    CARVEDILOL 3.125 MG Oral Tab Take 1 tablet (3.125 mg total) by mouth 2 (two) times daily with meals. 60 tablet 1 5/24/2024    SACUBITRIL-VALSARTAN 49-51 MG Oral Tab Take 1 tablet by mouth 2 (two) times daily. 60 tablet 1 5/24/2024    folic acid 800 MCG Oral Tab Take 1 tablet (800 mcg total) by mouth daily. 90 tablet 0 5/24/2024    amiodarone 200 MG Oral Tab Take 1 tablet (200 mg total) by mouth daily.   5/24/2024    methotrexate 2.5 MG Oral Tab TAKE 6 TABLETS BY MOUTH 1 TIME A WEEK 77 tablet 0 Past Week    alendronate 70 MG Oral Tab Take 1 tablet (70 mg total) by mouth once a week. 12 tablet 0 Past Week    ferrous sulfate 325 (65 FE) MG Oral Tab EC Take 1 tablet (325 mg  total) by mouth daily with breakfast.   2024    HYDROcodone-acetaminophen (NORCO)  MG Oral Tab Take 1 tablet by mouth every 6 (six) hours as needed for Pain. 60 tablet 0 Unknown    [] ciprofloxacin (CIPRO) 250 MG Oral Tab Take 1 tablet (250 mg total) by mouth 2 (two) times daily for 5 days. 10 tablet 0      Current Facility-Administered Medications   Medication Dose Route Frequency    isosorbide dinitrate (Isordil) tab 20 mg  20 mg Oral TID (Nitrates)    hydrALAZINE (Apresoline) tab 25 mg  25 mg Oral Q8H BROOKLYNN    gabapentin (Neurontin) cap 300 mg  300 mg Oral BID    polyethylene glycol (PEG 3350) (Miralax) 17 g oral packet 17 g  17 g Oral Daily    sennosides (Senokot) tab 8.6 mg  8.6 mg Oral BID    milrinone in dextrose 5% (Primacor) 20 mg/100mL infusion premix  0.25 mcg/kg/min Intravenous Continuous    cyclobenzaprine (Flexeril) tab 5 mg  5 mg Oral TID PRN    [Held by provider] furosemide (Lasix) tab 40 mg  40 mg Oral BID (Diuretic)    lidocaine-menthol 4-1 % patch 1 patch  1 patch Transdermal Daily    ipratropium-albuterol (Duoneb) 0.5-2.5 (3) MG/3ML inhalation solution 3 mL  3 mL Nebulization Q6H PRN    amiodarone (Pacerone) tab 200 mg  200 mg Oral Daily    [Held by provider] carvedilol (Coreg) tab 3.125 mg  3.125 mg Oral BID with meals    folic acid (Folvite) tab 800 mcg  800 mcg Oral Daily    mirtazapine (Remeron) tab 7.5 mg  7.5 mg Oral Nightly    pantoprazole (Protonix) DR tab 40 mg  40 mg Oral BID AC    [Held by provider] sacubitril-valsartan (Entresto) 49-51 MG per tab 1 tablet  1 tablet Oral BID    acetaminophen (Tylenol Extra Strength) tab 500 mg  500 mg Oral Q4H PRN    acetaminophen (Tylenol) tab 650 mg  650 mg Oral Q4H PRN    Or    HYDROcodone-acetaminophen (Norco) 5-325 MG per tab 1 tablet  1 tablet Oral Q4H PRN    Or    HYDROcodone-acetaminophen (Norco) 5-325 MG per tab 2 tablet  2 tablet Oral Q4H PRN    ondansetron (Zofran) 4 MG/2ML injection 4 mg  4 mg Intravenous Q6H PRN        REVIEW OF SYSTEMS:  Comprehensive Review of Systems obtained, and is negative other than that mentioned in the History of Present Illness.        PHYSICAL EXAMINATION:  VITAL SIGNS: /60   Pulse 60   Temp 98 °F (36.7 °C)   Resp 20   Ht 65\"   Wt 129 lb 4.8 oz (58.7 kg)   SpO2 97%   BMI 21.52 kg/m²   GENERAL:  Pleasant patient is a 79 year old female in no acute distress. Sitting in the recliner chair with a warm pack to the left side of her neck   HEENT:  Normocephalic, atraumatic  RESP: Easy and even   NEUROLOGICAL:  This patient is alert and orientated x 3.  Speech fluent. Comprehension intact.   Answers questions appropriately. Follows commands.       + tenderness over left C1 and C2 and C2 facet regions. + left occipital region tenderness. Non tender of the right occipital region. Non tender to midline cervical palpation.     Upper extremity strength:    Deltoid  Biceps  Triceps     Intrinsics      Right 5 5 5 4+* *     Left 5 5 5 4+* *   *Unable to accurately assess 2/2 to significant deformities from RA  Lower extremity strength:     Iliopsoas  Hamstrings   Quads    D-flexion P-flexion   Right       5         5       5         5 5   Left       5         5       5         5 5     No clonus or hoffmans  Patellar DTR 2/4    DIAGNOSTIC DATA:   Lab Results   Component Value Date    WBC 9.1 05/30/2024    HGB 8.2 05/30/2024    HCT 26.3 05/30/2024    .0 05/30/2024    CREATSERUM 2.07 05/30/2024    BUN 42 05/30/2024     05/30/2024    K 3.8 05/30/2024     05/30/2024    CO2 21.0 05/30/2024    GLU 91 05/30/2024    CA 8.9 05/30/2024    ALB 3.6 05/30/2024    PHOS 3.7 05/30/2024       IMAGING:  Study Result    Narrative   PROCEDURE:  MRI SPINE CERVICAL (CPT=72141)     COMPARISON:  None.     INDICATIONS:  M48.02 Degenerative cervical spinal stenosis M47.812 Arthropathy of cervical facet joint M43.12 Anterolisthesis of cervical spine     TECHNIQUE:  Multiplanar T1 and T2 weighted images  including fat suppression sequences.  Images acquired in sagittal and axial planes.       PATIENT STATED HISTORY: (As transcribed by Technologist)  Patient has neck pain with pain to back of head for 1 year..         FINDINGS:    CERVICAL DISC LEVELS:  C2-C3:  Diffuse disc osteophyte complex does not cause any significant stenosis of central canal or foramina.  C3-C4:  There is diffuse disc osteophyte complex and there are bilateral uncovertebral joint osteophytes.  There is mild central canal stenosis.  There is mild bilateral foraminal stenosis.  C4-C5:  There is severe facet hypertrophy at this level.  There is 5 mm of anterolisthesis of C4 with respect to C5.  There is no central canal stenosis.  There is moderate bilateral foraminal stenosis.  C5-C6:  No significant disc/facet abnormality, spinal stenosis, or foraminal narrowing.  C6-C7:  There is diffuse disc osteophyte complex.  There is left uncovertebral joint osteophyte.  There is mild to moderate left foraminal stenosis.  There is no central canal stenosis.  C7-T1:  No significant disc/facet abnormality, spinal stenosis, or foraminal narrowing.     CRANIOCERVICAL AREA:  There is advanced degenerative change at the anterior atlantoaxial joint with reactive edema and sclerosis in the odontoid process and anterior arch of C1.  There is no abnormal thickening of the transverse ligament.  PARASPINAL AREA:  Normal with no visible mass.    BONY STRUCTURES:  There are endplate osteophytes at all levels.  There is again noted to be anterolisthesis of C4 with respect to C5.  CORD:  Normal caliber, contour, and signal intensity.                     Impression   CONCLUSION:    1. There is multilevel degenerative disc disease in cervical spine which is described in detail level by level above.  2. There is 5 mm of anterolisthesis of C4 with respect to C5 related to degenerative change of the facets.        Dictated by (CST): Alfred Wild MD on 2/13/2023 at 1:17 PM       Finalized by (CST): Alfred Wild MD on 2/13/2023 at 1:21 PM        ASSESSMENT:  Patient Active Problem List   Diagnosis    Altered mental status    Altered mental status, unspecified altered mental status type    Seizure (HCC)    Lactic acidosis    Leukocytosis    Acute GI bleeding    GEORGE (acute kidney injury) (HCC)    Thrombocytopenia (HCC)    Metabolic acidosis    Atrial fibrillation, chronic (HCC)    Sepsis due to urinary tract infection (HCC)    Sepsis due to Escherichia coli (HCC)    ABLA (acute blood loss anemia)    Melena    Atrial fibrillation with rapid ventricular response (HCC)    Acute chest pain    Acute on chronic congestive heart failure, unspecified heart failure type (HCC)    Pleural effusion    ACC/AHA stage B systolic heart failure (HCC)    Congestive heart failure (HCC)    Coronary arteriosclerosis    Essential (primary) hypertension    Mitral valve stenosis    Rheumatoid arthritis (HCC)    Stage 3 chronic kidney disease (HCC)    Atrial flutter (HCC)    Acute on chronic HFrEF (heart failure with reduced ejection fraction) (HCC)    Dyspnea, unspecified type    Hypothermia, initial encounter    Anticoagulated    Coagulopathy (HCC)    SIRS (systemic inflammatory response syndrome) (HCC)    Anemia due to stage 3 chronic kidney disease (HCC)    Anemia due to GI blood loss    Anemia of chronic disease    Lymphopenia    Pancytopenia (HCC)    Goals of care, counseling/discussion    Advance care planning    Palliative care by specialist    Acute deep vein thrombosis (DVT) of left upper extremity (HCC)    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HCC)    Cardiogenic shock (HCC)    HFrEF (heart failure with reduced ejection fraction) (HCC)    Anemia    Acute kidney injury (HCC)    Iron deficiency anemia, unspecified iron deficiency anemia type    Weakness generalized    Acute kidney insufficiency    Hyperkalemia    Acute respiratory failure with hypoxia (HCC)     78 y/o with a pertinent PMH of multiple comorbidites who  was hospitalized for progressing SOB. Patient with acute on chronic Hf with acute respiratory failure with hypoxia., LVEF 15 % as well as GEORGE/CKD. Neurosurgery was consulted 2/2 to L > R cervical pain and likely a superior cervical radiculopathy, being managed outpatient by physiatry. She likely has a component of left occipital neuralgia as well. No significant relief with injections. Updated MRI cervical spine pending  Cervical DDD with of normal lordosis with multi level spinal stenosis, foraminal stenosis and facet arthropathy   A fib, ICD. Plan for Watchman device per cardiology     Plan:  No acute neurosurgical intervention recommended at this time  Medical management and pain management per hospitalist  Further neurosurgical recommendations pending MRI cervical spine  Given multiple comorbidities, will likely plan for follow up in the outpatient NSGY setting if surgical intervention is recommended. She'll require multiple clearances as well.   XR cervical with flex/ext to rule out atlantoaxial instability given hx of RA  Discussed with Dr. Mehta     The patient was seen and examined. The patient agreed with the plan, verbalized understanding and was appreciative.       Charmaine Pimentel M.S., PA-C  [address], [address] [address]  [phone]  5/30/2024 2:13 PM    Dragon speech recognition software was used to prepare this note. If a word or phrase is confusing, it is likely due to a failure of recognition. Please contact me with any questions or clarifications.    Is this a shared or split note between Advanced Practice Provider and Physician? No

## 2024-05-30 NOTE — PLAN OF CARE
A&O x 4, pleasant. Complaining of neck pain that radiates to the back of the head, MD aware. PRN Norco given and heating pad applied. Neurosurgery consulted, x-ray of cervical spine completed. Pt ambulating with assistance x 1 and rolling walker, ambulating within the room.   Stool specimen obtained and sent to lab.   Primacor infusing per orders.     Frequent rounding preformed by staff, safety precautions observed. Call light and personal needs within reach.     Problem: Patient Centered Care  Goal: Patient preferences are identified and integrated in the patient's plan of care  Description: Interventions:  - What would you like us to know as we care for you? From home with   - Provide timely, complete, and accurate information to patient/family  - Incorporate patient and family knowledge, values, beliefs, and cultural backgrounds into the planning and delivery of care  - Encourage patient/family to participate in care and decision-making at the level they choose  - Honor patient and family perspectives and choices  Outcome: Progressing     Problem: Patient/Family Goals  Goal: Patient/Family Long Term Goal  Description: Patient's Long Term Goal: discharge    Interventions:  - Monitor vital signs, labs, test results  - Oxygen and respiratory therapy as needed  - Pain management  - Follow MD orders  - Administer medications per MD order  - Diagnostics per order  - Update /  inform patient on plan of care  - Discharge planning  - See additional Care Plan goals for specific interventions  Outcome: Progressing  Goal: Patient/Family Short Term Goal  Description: Patient's Short Term Goal: correct electrolytes    Interventions:   - Nephro recommendations  - Follow MD orders  - Take meds as necessary for electrolyte replacement  - See additional Care Plan goals for specific interventions  Outcome: Progressing     Problem: RESPIRATORY - ADULT  Goal: Achieves optimal ventilation and oxygenation  Description:  INTERVENTIONS:  - Assess for changes in respiratory status  - Assess for changes in mentation and behavior  - Position to facilitate oxygenation and minimize respiratory effort  - Oxygen supplementation based on oxygen saturation or ABGs  - Provide Smoking Cessation handout, if applicable  - Encourage broncho-pulmonary hygiene including cough, deep breathe, Incentive Spirometry  - Assess the need for suctioning and perform as needed  - Assess and instruct to report SOB or any respiratory difficulty  - Respiratory Therapy support as indicated  - Manage/alleviate anxiety  - Monitor for signs/symptoms of CO2 retention  Outcome: Progressing     Problem: CARDIOVASCULAR - ADULT  Goal: Maintains optimal cardiac output and hemodynamic stability  Description: INTERVENTIONS:  - Monitor vital signs, rhythm, and trends  - Monitor for bleeding, hypotension and signs of decreased cardiac output  - Evaluate effectiveness of vasoactive medications to optimize hemodynamic stability  - Monitor arterial and/or venous puncture sites for bleeding and/or hematoma  - Assess quality of pulses, skin color and temperature  - Assess for signs of decreased coronary artery perfusion - ex. Angina  - Evaluate fluid balance, assess for edema, trend weights  Outcome: Progressing  Goal: Absence of cardiac arrhythmias or at baseline  Description: INTERVENTIONS:  - Continuous cardiac monitoring, monitor vital signs, obtain 12 lead EKG if indicated  - Evaluate effectiveness of antiarrhythmic and heart rate control medications as ordered  - Initiate emergency measures for life threatening arrhythmias  - Monitor electrolytes and administer replacement therapy as ordered  Outcome: Progressing     Problem: GASTROINTESTINAL - ADULT  Goal: Maintains or returns to baseline bowel function  Description: INTERVENTIONS:  - Assess bowel function  - Maintain adequate hydration with IV or PO as ordered and tolerated  - Evaluate effectiveness of GI medications  -  Encourage mobilization and activity  - Obtain nutritional consult as needed  - Establish a toileting routine/schedule  - Consider collaborating with pharmacy to review patient's medication profile  Outcome: Progressing     Problem: GENITOURINARY - ADULT  Goal: Absence of urinary retention  Description: INTERVENTIONS:  - Assess patient’s ability to void and empty bladder  - Monitor intake/output and perform bladder scan as needed  - Follow urinary retention protocol/standard of care  - Consider collaborating with pharmacy to review patient's medication profile  - Implement strategies to promote bladder emptying  Outcome: Progressing

## 2024-05-30 NOTE — PAYOR COMM NOTE
--------------  5/30:  CONTINUED STAY REVIEW    Payor: [employer]  Subscriber #:  [id number]  Authorization Number: [id number]    Admit date: 5/24/24  Admit time:  9:02 PM    NEPHROLOGY:    Subjective:   Margaret Silverio is a(n) 79 year old female who I am seeing for ckd/vol overload     Feels much better  Breathing is better  No new issues  On primacor  Uop slightly better 825      Objective:   /58 (BP Location: Left arm)   Pulse 60   Temp 97.8 °F (36.6 °C) (Oral)   Resp 18   Ht 5' 5\" (1.651 m)   Wt 129 lb 4.8 oz (58.7 kg)   SpO2 98%   BMI 21.52 kg/m²       Intake/Output Summary (Last 24 hours) at 5/30/2024 0938  Last data filed at 5/30/2024 0600      Gross per 24 hour   Intake 1035.75 ml   Output 825 ml   Net 210.75 ml          Wt Readings from Last 1 Encounters:   05/30/24 129 lb 4.8 oz (58.7 kg)         Exam  Vital signs: Blood pressure 129/58, pulse 60, temperature 97.8 °F (36.6 °C), temperature source Oral, resp. rate 18, height 5' 5\" (1.651 m), weight 129 lb 4.8 oz (58.7 kg), SpO2 98%.     General: No acute distress. Alert and oriented x 3.  HEENT: Moist mucous membranes. EOMI. PERRLA  Neck:  No JVD.   Respiratory: Clear to auscultation bilaterally.    Cardiovascular: S1, S2.  Regular rate and rhythm.   Abdomen: Soft, nontender, nondistended.    Neurologic: No focal neurological deficits.  Musculoskeletal: Full range of motion of all extremities.  No swelling noted.  Access:     Results:                Recent Labs   Lab 05/24/24  1750 05/25/24  0348 05/26/24  0543 05/28/24  0548 05/28/24  1921 05/29/24  0539 05/30/24  0457   RBC 2.69* 2.41*   < > 2.21*  --  2.68* 2.66*   HGB 8.3* 7.4*   < > 7.0* 8.8* 8.4* 8.2*   HCT 27.7* 23.7*   < > 22.4* 27.5* 26.1* 26.3*   .0* 98.3   < > 101.4*  --  97.4 98.9   MCH 30.9 30.7   < > 31.7  --  31.3 30.8   MCHC 30.0* 31.2   < > 31.3  --  32.2 31.2   RDW 18.7* 17.7*   < > 19.9*  --  21.0* 21.4*   NEPRELIM 3.67 3.33  --   --   --   --   --     WBC 5.3 5.0   < > 5.0  --  7.9 9.1   .0 273.0   < > 206.0  --  281.0 308.0    < > = values in this interval not displayed.                  Recent Labs   Lab 05/28/24  0548 05/29/24  0539 05/30/24  0457   GLU 86 94 91   BUN 44* 49* 42*   CREATSERUM 2.41* 2.39* 2.07*   CA 8.7 9.1 8.9   * 135* 136   K 3.9 4.0 3.8    104 105   CO2 19.0* 21.0 21.0            No results found.     Assessment and Plan:      79 year old female with past medical history of HTN, CKD 3b, HFrEF (EF 15-20%) + grade 4 DD, s/p AICD, A.fib, s/p bioprosthetic MVR, RA, and h/o GIB/AVMs, who was recently admitted from 5/16-5/18 for hypotension and george/ckd. S/p IVF and cr improved. Was discharged without diuretics.  She presented [facility] to ER with sob/ CHF exacerbation. Started on IV lasix. Cr at baseline on admission.   B/l cr 1.3-1.6 mg/dl     Sees Dr Arvizu as outpatient     Impression:     GEORGE likely over diuresed. Hold lasix for today, bladder scan and urine sodium  CKD 3 cr at b/l 1.4-1.6 mg/dl/ monitor cr with diuresis. [place] [facility]-cr bumped up with diuretics  HTN with CKD: acceptable. On coreg and entresto  Hyperkalemia s/p medical management . On entresto at home.  CHFrEF exacerbation, IV diuretics--> transition to PO diuretics  Anemia, can be contributing to her sob. IV iron and will give ROSA + OPRBC yest        Plan:     Renal fx slightly better on primacor  We can give a dose of lasix today  IV iron,+ rosa + prbc giveb  Labs in am  Prob bumex 1 mg po daily on dc -- + chf clinic fu     Please avoid nsaids/toradol in george pt        CARDIOLOGY:      Subjective:  Pt stated to have headache since last evening. Denies any chest pain or SOB.      Assessment/Plan:     Pt was recently admitted for hypotension and dehydration, discharged off diuretics, admitted on 5/25 with fluid overload      Acute on chronic HFrEF, NICM  - echo from 2/24 with LVEF 15-20%  - s/p CD/ICD  - chest xray 5/24/24 moderate pulmonary edema   - BNP more than  5000 on admission  - pt was on bipap, now on room air   - I&O noted with minimal urine output  - GDMT BB, (ARNI, lasix on hold due to elevated creatinine)    - pt was stared on milrinone gtt yesterday to help with renal perfusion,      Valvular dysfunction  - severe TR   - h/o bioprosthetic mitral valve replacement     Atrial flutter/Fibrillation  - Tele AV paced  - on amiodarone  - s/p watchman device     CKD 3  - baseline creatinine of 1.4-1.6  - Serum creatinine stable at 2.7  - nephrology following        Plan:  - Pt with complaints of headache, will reduce the dose of milrinone gtt  - blood pressure stable on milrinone gtt, will add hydralazine and isosorbide for LV dysfunction  - Coreg on hold since pt is on milrinone gtt  - minimal urine output for the past 24 hours, will give her a dose of lasix 20mg today, if good output may plan on repeating it this pm  - continue amiodarone  - entresto on hold due to GEORGE  - daily weight and strict intake and output     Plan discussed with pt and RN      KODI Mclain  [facility]  5/30/2024    PULMONARY:       Assessment & Plan:  Acute hypoxemic respiratory failure  Due to acute on chronic HFrEF and severe anemia  Possible underlying COPD with prior tobacco history  Also has underlying RA  Chest x-ray with pulmonary edema and trace basilar effusions/atelectasis  Chest x-ray 5/28 with improvement  Clinically improved - initially required BiPAP and now on room air  Plan:  -Management of CHF as per cardiology  -DuoNebs as needed  -Okay to discharge from pulmonary perspective     Acute on chronic HFrEF  Echo EF 15-20%, grade 4 diastolic dysfunction, wide-open tricuspid regurgitation  BNP markedly elevated  ?Inaccurate I/Os (+2.6 L and low urine output)  Diuretics on hold due to worsening renal function  Plan:  -I/Os  -As per cardiology     Acute on chronic kidney disease  Rise in creatinine with diuresis, now downtrending  Plan:  -Diuretics on  hold as per renal     Acute on chronic anemia  Seen by GI - h/o AVMs  S/p 1 unit pRBC 5/28  Hemoglobin stable  Plan:  -IV Venofer  -PPI  -As per GI     DVT prophylaxis: SCDs only in patient with severe anemia.     Jigar Lawrence PA-C  Pulmonary Medicine  5/30/2024       MEDICATIONS ADMINISTERED IN LAST 1 DAY:  amiodarone (Pacerone) tab 200 mg       Date Action Dose Route User    5/30/2024 0924 Given 200 mg Oral Sherrill Ty RN          cyclobenzaprine (Flexeril) tab 5 mg       Date Action Dose Route User    5/29/2024 2213 Given 5 mg Oral Rajesh Anderson RN          folic acid (Folvite) tab 800 mcg       Date Action Dose Route User    5/30/2024 0924 Given 800 mcg Oral Sherrill Ty RN          furosemide (Lasix) 10 mg/mL injection 20 mg       Date Action Dose Route User    5/30/2024 1036 Given 20 mg Intravenous Sherrill Ty RN          gabapentin (Neurontin) cap 100 mg       Date Action Dose Route User    5/30/2024 0924 Given 100 mg Oral Sherrill Ty RN    5/29/2024 2008 Given 100 mg Oral Rajesh Anderson RN    5/29/2024 1553 Given 100 mg Oral Zoë Sher RN          HYDROcodone-acetaminophen (Norco) 5-325 MG per tab 2 tablet       Date Action Dose Route User    5/30/2024 0925 Given 2 tablet Oral Sherrill Ty RN    5/30/2024 0311 Given 2 tablet Oral Rajesh Anderson RN    5/29/2024 2007 Given 2 tablet Oral Rajesh Anderson RN          iron sucrose (Venofer) 20 mg/mL injection 200 mg       Date Action Dose Route User    5/30/2024 0937 New Bag 200 mg Intravenous Sherrill Ty RN          isosorbide dinitrate (Isordil) tab 20 mg       Date Action Dose Route User    5/30/2024 1035 Given 20 mg Oral Sherrill Ty RN          lidocaine-menthol 4-1 % patch 1 patch       Date Action Dose Route User    5/30/2024 0937 Patch Applied 1 patch Transdermal (Mid Back) Sherrill Ty RN          lidocaine-menthol 4-1 % patch 1 patch       Date Action Dose Route User    5/29/2024 1576 Patch Applied 1  patch Transdermal (Right Upper Back) Rajesh Anderson RN          milrinone in dextrose 5% (Primacor) 20 mg/100mL infusion premix       Date Action Dose Route User    5/30/2024 0938 Rate/Dose Change 0.25 mcg/kg/min × 58.3 kg Intravenous Sherrill Ty RN    5/30/2024 0004 New Bag 0.375 mcg/kg/min × 58.3 kg Intravenous Rajesh Anderson RN    5/29/2024 1305 New Bag 0.375 mcg/kg/min × 58.3 kg Intravenous Zoë Sher RN          mirtazapine (Remeron) tab 7.5 mg       Date Action Dose Route User    5/29/2024 2007 Given 7.5 mg Oral Rajesh Anderson RN          pantoprazole (Protonix) DR tab 40 mg       Date Action Dose Route User    5/30/2024 0620 Given 40 mg Oral Rajesh Anderson RN    5/29/2024 1600 Given 40 mg Oral Zoë Sher RN          polyethylene glycol (PEG 3350) (Miralax) 17 g oral packet 17 g       Date Action Dose Route User    5/30/2024 0937 Given 17 g Oral Sherrill Ty RN          potassium chloride (Klor-Con M20) tab 20 mEq       Date Action Dose Route User    5/30/2024 1036 Given 20 mEq Oral Sherrill Ty RN          sennosides (Senokot) tab 8.6 mg       Date Action Dose Route User    5/30/2024 0924 Given 8.6 mg Oral Sherrill Ty RN    5/29/2024 2008 Given 8.6 mg Oral Rajesh Anderson RN            Vitals (last day)       Date/Time Temp Pulse Resp BP SpO2 Weight O2 Device O2 Flow Rate (L/min) Who    05/30/24 0919 97.8 °F (36.6 °C) 60 18 129/58 98 % -- None (Room air) -- SM    05/30/24 0616 97.2 °F (36.2 °C) 73 16 124/74 96 % -- None (Room air) -- JG    05/30/24 0440 -- -- -- -- -- 129 lb 4.8 oz -- -- MB    05/30/24 0302 -- -- -- 150/71 -- -- -- --     05/30/24 0003 97.5 °F (36.4 °C) 60 17 126/64 99 % -- None (Room air) --     05/29/24 2213 -- -- -- 127/63 -- -- -- --     05/29/24 2007 98.7 °F (37.1 °C) 60 19 131/64 99 % -- None (Room air) --     05/29/24 1743 98.2 °F (36.8 °C) 62 18 149/65 98 % -- None (Room air) --     05/29/24 1550 -- 63 -- 126/61 -- -- -- --     05/29/24 1326 --  60 -- 135/83 -- -- -- --     05/29/24 1304 98.1 °F (36.7 °C) 60 20 133/75 98 % -- None (Room air) --     05/29/24 1121 -- 60 -- -- -- -- -- -- CM    05/29/24 0833 96.7 °F (35.9 °C) 61 19 134/75 99 % -- -- -- GV    05/29/24 0833 -- -- -- -- -- -- None (Room air) --     05/29/24 0631 -- -- -- -- -- 128 lb 8 oz -- -- BA    05/29/24 0554 97.3 °F (36.3 °C) 60 18 135/73 97 % -- None (Room air) --     05/29/24 0023 96.9 °F (36.1 °C) 60 21 121/79 99 % -- None (Room air) --               Blood Transfusion Record       Product Unit Status Volume Start End            Transfuse RBC       24  [id number]  [id number] Completed 05/28/24 1859 793 mL 05/28/24 1515 05/28/24 1859

## 2024-05-30 NOTE — PLAN OF CARE
Problem: Patient Centered Care  Goal: Patient preferences are identified and integrated in the patient's plan of care  Description: Interventions:  - What would you like us to know as we care for you? From home with   - Provide timely, complete, and accurate information to patient/family  - Incorporate patient and family knowledge, values, beliefs, and cultural backgrounds into the planning and delivery of care  - Encourage patient/family to participate in care and decision-making at the level they choose  - Honor patient and family perspectives and choices  Outcome: Progressing     Problem: Patient/Family Goals  Goal: Patient/Family Long Term Goal  Description: Patient's Long Term Goal: discharge    Interventions:  - Monitor vital signs, labs, test results  - Oxygen and respiratory therapy as needed  - Pain management  - Follow MD orders  - Administer medications per MD order  - Diagnostics per order  - Update /  inform patient on plan of care  - Discharge planning  - See additional Care Plan goals for specific interventions  Outcome: Progressing  Goal: Patient/Family Short Term Goal  Description: Patient's Short Term Goal: correct electrolytes    Interventions:   - Nephro recommendations  - Follow MD orders  - Take meds as necessary for electrolyte replacement  - See additional Care Plan goals for specific interventions  Outcome: Progressing     Problem: RESPIRATORY - ADULT  Goal: Achieves optimal ventilation and oxygenation  Description: INTERVENTIONS:  - Assess for changes in respiratory status  - Assess for changes in mentation and behavior  - Position to facilitate oxygenation and minimize respiratory effort  - Oxygen supplementation based on oxygen saturation or ABGs  - Provide Smoking Cessation handout, if applicable  - Encourage broncho-pulmonary hygiene including cough, deep breathe, Incentive Spirometry  - Assess the need for suctioning and perform as needed  - Assess and instruct to report SOB  or any respiratory difficulty  - Respiratory Therapy support as indicated  - Manage/alleviate anxiety  - Monitor for signs/symptoms of CO2 retention  Outcome: Progressing     Problem: CARDIOVASCULAR - ADULT  Goal: Maintains optimal cardiac output and hemodynamic stability  Description: INTERVENTIONS:  - Monitor vital signs, rhythm, and trends  - Monitor for bleeding, hypotension and signs of decreased cardiac output  - Evaluate effectiveness of vasoactive medications to optimize hemodynamic stability  - Monitor arterial and/or venous puncture sites for bleeding and/or hematoma  - Assess quality of pulses, skin color and temperature  - Assess for signs of decreased coronary artery perfusion - ex. Angina  - Evaluate fluid balance, assess for edema, trend weights  Outcome: Progressing  Goal: Absence of cardiac arrhythmias or at baseline  Description: INTERVENTIONS:  - Continuous cardiac monitoring, monitor vital signs, obtain 12 lead EKG if indicated  - Evaluate effectiveness of antiarrhythmic and heart rate control medications as ordered  - Initiate emergency measures for life threatening arrhythmias  - Monitor electrolytes and administer replacement therapy as ordered  Outcome: Progressing     Problem: GASTROINTESTINAL - ADULT  Goal: Maintains or returns to baseline bowel function  Description: INTERVENTIONS:  - Assess bowel function  - Maintain adequate hydration with IV or PO as ordered and tolerated  - Evaluate effectiveness of GI medications  - Encourage mobilization and activity  - Obtain nutritional consult as needed  - Establish a toileting routine/schedule  - Consider collaborating with pharmacy to review patient's medication profile  Outcome: Progressing     Problem: GENITOURINARY - ADULT  Goal: Absence of urinary retention  Description: INTERVENTIONS:  - Assess patient’s ability to void and empty bladder  - Monitor intake/output and perform bladder scan as needed  - Follow urinary retention  protocol/standard of care  - Consider collaborating with pharmacy to review patient's medication profile  - Implement strategies to promote bladder emptying  Outcome: Progressing     Monitoring vital signs, stable at this moment. Pain medication provided as needed, MD contacted for more orders d/t severe chronic neck pain, flexiril added. Call light within reach, bed locked in lowest position, all fall precautions in place. All needs addressed. Frequent rounding maintained by nursing staff. Patient completing Milrinone drip per MD orders, blood pressure stable at this time, blood pressure and diuretic medications on hold.

## 2024-05-30 NOTE — OCCUPATIONAL THERAPY NOTE
OCCUPATIONAL THERAPY TREATMENT NOTE - INPATIENT        Room Number: 512/512-A     Presenting Problem: CHF, respiratory failure, pulm edema    Problem List  Principal Problem:    Acute respiratory failure with hypoxia (HCC)      OCCUPATIONAL THERAPY ASSESSMENT   Patient demonstrates good  progress this session, goals remain in progress.    Patient continues to function below baseline with  ADLs and fx mobility/transfers .   Contributing factors to remaining limitations include decreased functional strength, decreased functional reach, decreased endurance, pain, and decreased muscular endurance.  Next session anticipate patient to progress upper body dressing, lower body dressing, static standing balance, dynamic standing balance, and functional standing tolerance.  Occupational Therapy will continue to follow patient for duration of hospitalization.    Patient continues to benefit from continued skilled OT services: at discharge to promote functional independence and safety with additional support and return home with home health OT.     PLAN  OT Treatment Plan: Balance activities;Energy conservation/work simplification techniques;ADL training;Functional transfer training;UE strengthening/ROM;Endurance training;Patient/Family education;Patient/Family training;Equipment eval/education;Compensatory technique education     SUBJECTIVE  \"I have DDD in my neck and the pain has been getting worse.\"    OBJECTIVE  Precautions: Bed/chair alarm    WEIGHT BEARING RESTRICTION  None    PAIN ASSESSMENT  Rating: -- (not rated)  Location: headache d/t chronic DDD  Management Techniques: Activity promotion; Repositioning; Nurse notified; Heat    ACTIVITIES OF DAILY LIVING ASSESSMENT  AM-PAC ‘6-Clicks’ Inpatient Daily Activity Short Form  How much help from another person does the patient currently need…  -   Putting on and taking off regular lower body clothing?: A Lot  -   Bathing (including washing, rinsing, drying)?: A Lot  -    Toileting, which includes using toilet, bedpan or urinal? : A Little  -   Putting on and taking off regular upper body clothing?: A Little  -   Taking care of personal grooming such as brushing teeth?: A Little  -   Eating meals?: A Little    AM-PAC Score:  Score: 16  Approx Degree of Impairment: 53.32%  Standardized Score (AM-PAC Scale): 35.96  CMS Modifier (G-Code): CK    BED MOBILITY  Supine to Sit: Min assist    FUNCTIONAL TRANSFER ASSESSMENT  Sit to Stand from EOB: CGA  Chair Transfer: CGA    FUNCTIONAL MOBILITY  CGA for steps from EOB to chair using RW  Comments:  Patient unable to tolerate further ax s/t neck/headache. Benefited from increased time for performing all tasks.    FUNCTIONAL ADL ASSESSMENT  Eating: min assist d/t arthritic hands  LB Dressing: mod assist    EDUCATION PROVIDED  Patient: Role of Occupational Therapy; Plan of Care; Discharge Recommendations; Functional Transfer Techniques; Fall Prevention; Posture/Positioning; Proper Body Mechanics  Patient's Response to Education: Verbalized Understanding  Family/Caregiver: Role of Occupational Therapy; Plan of Care; Functional Transfer Techniques; Energy Conservation; Compensatory ADL Techniques  Family/Caregiver's Response to Education: Verbalized Understanding; Returned Demonstration    The patient's Approx Degree of Impairment: 53.32% has been calculated based on documentation in the [facility] '6 clicks' Inpatient Daily Activity Short Form.  Research supports that patients with this level of impairment may benefit from rehab.  Final disposition will be made by interdisciplinary medical team.    Patient End of Session: Up in chair;Needs met;Call light within reach;RN aware of session/findings;All patient questions and concerns addressed;Alarm set;Family present    OT Goals:  Patient's self stated goal is: improved breathing with ADLs     Patient will complete functional transfer with mod I  Comment: ongoing    Patient will complete toileting with mod  I  Comment: ongoing    Patient will tolerate standing for 5 minutes in prep for adls with mod I   Comment: ongoing    Patient incorporate at least 5 energy conservation techs during ADLs with mod I  Comment: ongoing          Goals  on: 2024  Frequency: 3-5x/wk    OT Session Time  Therapeutic Activity: 20 minutes    MAKSIM Romo/L  [facility]  #[id number]

## 2024-05-31 DIAGNOSIS — M05.79 RHEUMATOID ARTHRITIS INVOLVING MULTIPLE SITES WITH POSITIVE RHEUMATOID FACTOR (HCC): ICD-10-CM

## 2024-05-31 DIAGNOSIS — M54.2 BILATERAL POSTERIOR NECK PAIN: ICD-10-CM

## 2024-05-31 DIAGNOSIS — M06.9 RHEUMATOID ARTHRITIS OF HAND, UNSPECIFIED LATERALITY, UNSPECIFIED WHETHER RHEUMATOID FACTOR PRESENT (HCC): ICD-10-CM

## 2024-05-31 DIAGNOSIS — M51.36 LUMBAR DEGENERATIVE DISC DISEASE: ICD-10-CM

## 2024-05-31 LAB
ANION GAP SERPL CALC-SCNC: 8 MMOL/L (ref 0–18)
BUN BLD-MCNC: 45 MG/DL (ref 9–23)
BUN/CREAT SERPL: 27.6 (ref 10–20)
CALCIUM BLD-MCNC: 9.1 MG/DL (ref 8.7–10.4)
CHLORIDE SERPL-SCNC: 107 MMOL/L (ref 98–112)
CO2 SERPL-SCNC: 23 MMOL/L (ref 21–32)
CREAT BLD-MCNC: 1.63 MG/DL
DEPRECATED RDW RBC AUTO: 77.1 FL (ref 35.1–46.3)
EGFRCR SERPLBLD CKD-EPI 2021: 32 ML/MIN/1.73M2 (ref 60–?)
ERYTHROCYTE [DISTWIDTH] IN BLOOD BY AUTOMATED COUNT: 21.9 % (ref 11–15)
GLUCOSE BLD-MCNC: 89 MG/DL (ref 70–99)
HCT VFR BLD AUTO: 29.2 %
HGB BLD-MCNC: 9.4 G/DL
MCH RBC QN AUTO: 32.1 PG (ref 26–34)
MCHC RBC AUTO-ENTMCNC: 32.2 G/DL (ref 31–37)
MCV RBC AUTO: 99.7 FL
OSMOLALITY SERPL CALC.SUM OF ELEC: 297 MOSM/KG (ref 275–295)
PLATELET # BLD AUTO: 314 10(3)UL (ref 150–450)
POTASSIUM SERPL-SCNC: 4 MMOL/L (ref 3.5–5.1)
POTASSIUM SERPL-SCNC: 4 MMOL/L (ref 3.5–5.1)
RBC # BLD AUTO: 2.93 X10(6)UL
SODIUM SERPL-SCNC: 138 MMOL/L (ref 136–145)
WBC # BLD AUTO: 6.7 X10(3) UL (ref 4–11)

## 2024-05-31 PROCEDURE — 99233 SBSQ HOSP IP/OBS HIGH 50: CPT | Performed by: HOSPITALIST

## 2024-05-31 PROCEDURE — 99232 SBSQ HOSP IP/OBS MODERATE 35: CPT | Performed by: INTERNAL MEDICINE

## 2024-05-31 PROCEDURE — 99232 SBSQ HOSP IP/OBS MODERATE 35: CPT | Performed by: NEUROLOGICAL SURGERY

## 2024-05-31 RX ORDER — ACETAMINOPHEN 500 MG
500 TABLET ORAL EVERY 4 HOURS PRN
Status: DISCONTINUED | OUTPATIENT
Start: 2024-05-31 | End: 2024-06-03

## 2024-05-31 RX ORDER — BUTALBITAL, ACETAMINOPHEN AND CAFFEINE 50; 325; 40 MG/1; MG/1; MG/1
1 TABLET ORAL EVERY 4 HOURS PRN
Status: DISCONTINUED | OUTPATIENT
Start: 2024-05-31 | End: 2024-06-03

## 2024-05-31 RX ORDER — FUROSEMIDE 10 MG/ML
20 INJECTION INTRAMUSCULAR; INTRAVENOUS ONCE
Status: COMPLETED | OUTPATIENT
Start: 2024-05-31 | End: 2024-05-31

## 2024-05-31 RX ORDER — MORPHINE SULFATE 2 MG/ML
0.5 INJECTION, SOLUTION INTRAMUSCULAR; INTRAVENOUS ONCE
Status: DISCONTINUED | OUTPATIENT
Start: 2024-05-31 | End: 2024-05-31

## 2024-05-31 NOTE — DIETARY NOTE
Brief Nutrition Note:    Pt admitted for acute respiratory failure with hypoxia. Pt screened at  no nutrition risk at admission by RN. Pt re-screened for length of stay (LOS) - hospital day 7. Chart reviewed, pt admitted 5/24 with c/o increased shortness of breath/CHF exacerbation. Intakes reviewed, % x 10 meals since admission (averaging 91% overall - good) with 1 refusal noted on 5/29. Current weight 134# 2 oz. EMR weight review, gradual weight gain noted since admission. Per EMR weight review, pt noted weighing 129# 13 oz on 5/26/24 - stable, 126# on 4/11/24 - stable x 1 month, 137# 3 oz on 2/18/24 - 3.1# or 2.2% weight loss x 3 months (discharge weight), 149# 14 oz on 2/3/24 - 15.8# or 10.5% weight loss compared to February admit weight and 147# 15 oz on 5/5/23 - 13.8# or 9.3% weight loss x 1 year (non-significant). Pt known to nutrition services from previous admissions, during February admission pt noted with weight fluctuations r/t diuresing - currently not on any diuretics. Difficult to determine true weight loss vs fluid loss. Pt visited, no family in room. Pt reports being in pain r/t neck and headache - RN aware. Pt reports appetite improving and if ok. Pt reports about 3 meals a day prior to admission (PTA) and drinks 2-3 Ensures daily. Pt reports weight loss during February r/t not eating well/no appetite then but intake/appetite has improved since. Nutrition focused physical exam (NFPE) completed, mild wasting to clavicle. Pt agreeable to receive Ensure while in hospital and prefers strawberry since uses at home. Pt appears to remain at no nutrition risk at this time. F/u per protocol. Please consult nutrition services if earlier intervention is indicated.    Wt Readings from Last 6 Encounters:   05/31/24 60.8 kg (134 lb 1.6 oz)   05/30/24 58.7 kg (129 lb 6.6 oz)   05/18/24 57.6 kg (127 lb)   05/02/24 57.2 kg (126 lb)   04/23/24 57.2 kg (126 lb)   04/11/24 57.2 kg (126 lb)     Patient Weight(s)  for the past 336 hrs:   Weight   05/31/24 0554 60.8 kg (134 lb 1.6 oz)   05/30/24 0440 58.7 kg (129 lb 4.8 oz)   05/29/24 0631 58.3 kg (128 lb 8 oz)   05/28/24 0534 57.6 kg (127 lb)   05/27/24 0642 59.2 kg (130 lb 8 oz)   05/26/24 0500 56.6 kg (124 lb 12.8 oz)   05/25/24 0504 56.1 kg (123 lb 10.9 oz)   05/24/24 2241 56.2 kg (123 lb 14.4 oz)   05/24/24 1724 57 kg (125 lb 10.6 oz)     Percent Meals Eaten (last 6 days)       Date/Time Percent Meals Eaten (%)    05/25/24 1600 80 %    05/26/24 1149 80 %    05/26/24 1800 0 %     Percent Meals Eaten (%): Pt's family brought food at 05/26/24 1800    05/27/24 1154 100 %    05/27/24 1716 80 %    05/28/24 0900 100 %    05/28/24 1330 100 %     Percent Meals Eaten (%): Pt's family brought patient food at 05/28/24 1330    05/29/24 1044 90 %    05/29/24 1500 0 %     Percent Meals Eaten (%): Pt refused lunch at 05/29/24 1500    05/29/24 2014 75 %    05/30/24 1149 100 %    05/30/24 1555 100 %          - Medical Food Supplements-RD added Ensure Enlive Daily (350 calories, 20 grams protein) Daily. Rational/use of oral supplements discussed.    Fidelina Ambrose MS, HANS, RDN, LDN  Clinical Dietitian  P: [phone]

## 2024-05-31 NOTE — PLAN OF CARE
Problem: Patient Centered Care  Goal: Patient preferences are identified and integrated in the patient's plan of care  Description: Interventions:  - What would you like us to know as we care for you? From home with   - Provide timely, complete, and accurate information to patient/family  - Incorporate patient and family knowledge, values, beliefs, and cultural backgrounds into the planning and delivery of care  - Encourage patient/family to participate in care and decision-making at the level they choose  - Honor patient and family perspectives and choices  Outcome: Progressing     Problem: Patient/Family Goals  Goal: Patient/Family Long Term Goal  Description: Patient's Long Term Goal: discharge    Interventions:  - Monitor vital signs, labs, test results  - Oxygen and respiratory therapy as needed  - Pain management  - Follow MD orders  - Administer medications per MD order  - Diagnostics per order  - Update /  inform patient on plan of care  - Discharge planning  - See additional Care Plan goals for specific interventions  Outcome: Progressing  Goal: Patient/Family Short Term Goal  Description: Patient's Short Term Goal: correct electrolytes    Interventions:   - Nephro recommendations  - Follow MD orders  - Take meds as necessary for electrolyte replacement  - See additional Care Plan goals for specific interventions  Outcome: Progressing     Problem: RESPIRATORY - ADULT  Goal: Achieves optimal ventilation and oxygenation  Description: INTERVENTIONS:  - Assess for changes in respiratory status  - Assess for changes in mentation and behavior  - Position to facilitate oxygenation and minimize respiratory effort  - Oxygen supplementation based on oxygen saturation or ABGs  - Provide Smoking Cessation handout, if applicable  - Encourage broncho-pulmonary hygiene including cough, deep breathe, Incentive Spirometry  - Assess the need for suctioning and perform as needed  - Assess and instruct to report SOB  or any respiratory difficulty  - Respiratory Therapy support as indicated  - Manage/alleviate anxiety  - Monitor for signs/symptoms of CO2 retention  Outcome: Progressing     Problem: CARDIOVASCULAR - ADULT  Goal: Maintains optimal cardiac output and hemodynamic stability  Description: INTERVENTIONS:  - Monitor vital signs, rhythm, and trends  - Monitor for bleeding, hypotension and signs of decreased cardiac output  - Evaluate effectiveness of vasoactive medications to optimize hemodynamic stability  - Monitor arterial and/or venous puncture sites for bleeding and/or hematoma  - Assess quality of pulses, skin color and temperature  - Assess for signs of decreased coronary artery perfusion - ex. Angina  - Evaluate fluid balance, assess for edema, trend weights  Outcome: Progressing  Goal: Absence of cardiac arrhythmias or at baseline  Description: INTERVENTIONS:  - Continuous cardiac monitoring, monitor vital signs, obtain 12 lead EKG if indicated  - Evaluate effectiveness of antiarrhythmic and heart rate control medications as ordered  - Initiate emergency measures for life threatening arrhythmias  - Monitor electrolytes and administer replacement therapy as ordered  Outcome: Progressing     Problem: GASTROINTESTINAL - ADULT  Goal: Maintains or returns to baseline bowel function  Description: INTERVENTIONS:  - Assess bowel function  - Maintain adequate hydration with IV or PO as ordered and tolerated  - Evaluate effectiveness of GI medications  - Encourage mobilization and activity  - Obtain nutritional consult as needed  - Establish a toileting routine/schedule  - Consider collaborating with pharmacy to review patient's medication profile  Outcome: Progressing     Problem: GENITOURINARY - ADULT  Goal: Absence of urinary retention  Description: INTERVENTIONS:  - Assess patient’s ability to void and empty bladder  - Monitor intake/output and perform bladder scan as needed  - Follow urinary retention  protocol/standard of care  - Consider collaborating with pharmacy to review patient's medication profile  - Implement strategies to promote bladder emptying  Outcome: Progressing     Problem: PAIN - ADULT  Goal: Verbalizes/displays adequate comfort level or patient's stated pain goal  Description: INTERVENTIONS:  - Encourage pt to monitor pain and request assistance  - Assess pain using appropriate pain scale  - Administer analgesics based on type and severity of pain and evaluate response  - Implement non-pharmacological measures as appropriate and evaluate response  - Consider cultural and social influences on pain and pain management  - Manage/alleviate anxiety  - Utilize distraction and/or relaxation techniques  - Monitor for opioid side effects  - Notify MD/LIP if interventions unsuccessful or patient reports new pain  - Anticipate increased pain with activity and pre-medicate as appropriate  Outcome: Not Progressing     Vitals stable. Patient c/o pain throughout the shift. New medication ordered for headaches. Iv infusing.

## 2024-05-31 NOTE — PROGRESS NOTES
Progress Note  Margaret Silverio Patient Status:  Inpatient    3/14/1945 MRN [id number]   Location [facility]5W Attending Regan Cruz MD   Hosp Day # 7 PCP Johnathon Rodrgiuez,      Subjective:  Pt sitting up in chair, states her headache/neck pain is better today. Denies chest pain, SOB, or orthopnea.      Objective:  /59 (BP Location: Right arm)   Pulse 60   Temp 97.4 °F (36.3 °C) (Oral)   Resp 16   Ht 5' 5\" (1.651 m)   Wt 134 lb 1.6 oz (60.8 kg)   SpO2 99%   BMI 22.32 kg/m²     Telemetry: AV Paced 60 bpm    Intake/Output:    Intake/Output Summary (Last 24 hours) at 2024 1319  Last data filed at 2024 0950  Gross per 24 hour   Intake 558.09 ml   Output 1000 ml   Net -441.91 ml       Last 3 Weights   24 0554 134 lb 1.6 oz (60.8 kg)   24 0440 129 lb 4.8 oz (58.7 kg)   24 0631 128 lb 8 oz (58.3 kg)   24 0534 127 lb (57.6 kg)   24 0642 130 lb 8 oz (59.2 kg)   24 0500 124 lb 12.8 oz (56.6 kg)   24 0504 123 lb 10.9 oz (56.1 kg)   24 2241 123 lb 14.4 oz (56.2 kg)   24 1724 125 lb 10.6 oz (57 kg)   24 1704 129 lb 6.6 oz (58.7 kg)   24 0633 127 lb (57.6 kg)   24 0510 119 lb 8 oz (54.2 kg)   24 0500 129 lb 12.8 oz (58.9 kg)       Labs:  Recent Labs   Lab 24  0539 24  0457 24  0539   GLU 94 91 89   BUN 49* 42* 45*   CREATSERUM 2.39* 2.07* 1.63*   EGFRCR 20* 24* 32*   CA 9.1 8.9 9.1   * 136 138   K 4.0 3.8 4.0  4.0    105 107   CO2 21.0 21.0 23.0     Recent Labs   Lab 24  1750 24  0348 24  0543 24  0539 24  0457 24  0539   RBC 2.69* 2.41*   < > 2.68* 2.66* 2.93*   HGB 8.3* 7.4*   < > 8.4* 8.2* 9.4*   HCT 27.7* 23.7*   < > 26.1* 26.3* 29.2*   .0* 98.3   < > 97.4 98.9 99.7   MCH 30.9 30.7   < > 31.3 30.8 32.1   MCHC 30.0* 31.2   < > 32.2 31.2 32.2   RDW 18.7* 17.7*   < > 21.0* 21.4* 21.9*   NEPRELIM 3.67 3.33  --   --   --   --    WBC  5.3 5.0   < > 7.9 9.1 6.7   .0 273.0   < > 281.0 308.0 314.0    < > = values in this interval not displayed.         Recent Labs   Lab 05/24/24 1936   TROPHS 24       Diagnostics:  XR CERVICAL SPINE (4VIEWS) (CPT=72050)    Result Date: 5/30/2024  CONCLUSION:  1. Multilevel cervical spine degenerative changes, which are most pronounced at C5-C6 and C6-C7. 2. Moderate dextroscoliosis of the cervical spine. 3. Stable chronic grade I anterolisthesis of C4 relative to C5.  This does not significantly change between flexion and extension views. 4. Additional trace degenerative anterolisthesis of C3 relative to C4, which worsens on flexion and improves on extension.  As such, mild instability is suspected at C3-C4. 5. Right greater than left carotid bifurcation atherosclerosis.  Partially imaged cardiomegaly and left chest wall pacemaker electrodes.   Dictated by (CST): Aleks Diana MD on 5/30/2024 at 4:30 PM     Finalized by (CST): Aleks Diana MD on 5/30/2024 at 4:35 PM          Review of Systems   Cardiovascular:  Negative for chest pain, leg swelling, orthopnea and paroxysmal nocturnal dyspnea.   Respiratory: Negative.     Neurological:  Negative for headaches.       Physical Exam:    Gen: alert, oriented x 3, NAD  Heent: pupils equal, reactive. Mucous membranes moist.   Neck: + jvd  Cardiac: regular rate and rhythm, normal S1,S2, +3/6 systolic murmur, no clicks, rub or gallop  Lungs: bibasilar crackles  Abd: soft, NT/ND +bs  Ext: no edema  Skin: Warm, dry  Neuro: No focal deficits      Medications:     [Held by provider] isosorbide dinitrate  20 mg Oral TID (Nitrates)    hydrALAZINE  25 mg Oral Q8H BROOKLYNN    gabapentin  300 mg Oral BID    polyethylene glycol (PEG 3350)  17 g Oral Daily    sennosides  8.6 mg Oral BID    [Held by provider] furosemide  40 mg Oral BID (Diuretic)    lidocaine-menthol  1 patch Transdermal Daily    amiodarone  200 mg Oral Daily    [Held by provider] carvedilol  3.125 mg Oral BID  with meals    folic acid  800 mcg Oral Daily    mirtazapine  7.5 mg Oral Nightly    pantoprazole  40 mg Oral BID AC    [Held by provider] sacubitril-valsartan  1 tablet Oral BID      milrinone in dextrose 5% 0.25 mcg/kg/min (05/31/24 0639)       Assessment:  Acute on Chronic HFrEF, NICM s/p Abbott Dual Chamber PPM/ICD  BNP >5000 on admit  TTE w/LVEF 15-20%, G4DD, biatrial dilation, wide open TR  [facility] 10/2022 w/o obstructive CAD  Historically on coreg, entresto - on hold   On milrinone gtt @ 0.25mcg/kg/min  Diuresing w/IV Lasix: improved u/o yesterday, wt up from baseline  BP stable, Cr improving  On room air  Severe Tricuspid Regurgitation  Hx Mitral Valve Replacement  Hypertension - controlled  On hydralazine  Coreg, entresto, isordil on hold   Hx Atrial Fibrillation/Flutter  AV paced, on po amiodarone  Not on BB d/t milrinone gtt  Not on A/C s/p Watchman  GEORGE on CKD (Baseline Cr 1.4-1.6)  Cr improving  Nephrology following  Headache/Cervical Pain  Neurosurgery following  MRI cervical spine pending   Acute on Chronic Anemia - Hgb 9.4  Hx GIB   Seizure Disorder  Rheumatoid Arthritis     Plan:  Continue milrinone gtt  Agree with Lasix 20mg x1 today and monitor urine output  Strict I&O, daily standing weight, daily BMP  Hold coreg, entresto  Restart isordil with improvement in headaches  Continue amiodarone  MRI to send form to [facility] device clinic for completion     Plan of care discussed with patient, RN.    Carole Shaw, KODI  5/31/2024  1:19 PM  [phone] [facility]  [phone] [facility]

## 2024-05-31 NOTE — DIETARY NOTE
Brief Nutrition Note:    Pt re-screened at no nutrition risk earlier today by RD. RD received consult for heart failure diet education. Pt provided with heart failure diet education on 2/5/24 and handout provided for reinforcement. RD to clear consult. RD to remain available, please consult nutrition services if earlier intervention is indicated.    Fidelina Ambrose MS, HANS, RDN, LDN  Clinical Dietitian  P: [phone]

## 2024-05-31 NOTE — PROGRESS NOTES
[facility]  part of [facility]    Neurosurgery Progress Note    Margaret Silverio Patient Status:  Inpatient    3/14/1945 MRN [id number]   Location [facility]5W Attending Regan Cruz MD   Hosp Day # 7 PCP Johnathon Rodriguez, DO     Subjective:  Margaret Silverio is a(n) 79 year old female with chronic cervicalgia, who follows with physiatry for epidural steroid injections, admitted to the hospital for worsening shortness of breath.  Patient continues to deny radiating upper extremity pain, hand numbness, tingling, weakness.  She reports no lower extremity pain or weakness, gait disturbance or bowel or bladder changes.  Alert, orientated x3.  Cooperative.  No apparent distress.    Awaiting MRI cervical spine.  Patient has ICD which will need to be consulted on by clinical representative prior to imaging.      X-ray cervical spine shows stable chronic C4-5 anterolisthesis and C3-4 anterolisthesis which worsens on flexion imaging.    Vital Signs:    Temp:  [97.8 °F (36.6 °C)-98.6 °F (37 °C)] 98.1 °F (36.7 °C)  Pulse:  [60] 60  Resp:  [16-20] 16  BP: (112-148)/(52-98) 136/83  SpO2:  [96 %-100 %] 100 %    I/O:  I/O last 3 completed shifts:  In: 1496.5 [P.O.:1336; I.V.:160.5]  Out: 1325 [Urine:1325]    Inpatient Medications:    Current Facility-Administered Medications:     acetaminophen (Tylenol Extra Strength) tab 500 mg, 500 mg, Oral, Q4H PRN    butalbital-acetaminophen-caffeine (Fioricet) -40 MG per tab 1 tablet, 1 tablet, Oral, Q4H PRN    [Held by provider] isosorbide dinitrate (Isordil) tab 20 mg, 20 mg, Oral, TID (Nitrates)    hydrALAZINE (Apresoline) tab 25 mg, 25 mg, Oral, Q8H BROOKLYNN    gabapentin (Neurontin) cap 300 mg, 300 mg, Oral, BID    polyethylene glycol (PEG 3350) (Miralax) 17 g oral packet 17 g, 17 g, Oral, Daily    sennosides (Senokot) tab 8.6 mg, 8.6 mg, Oral, BID    milrinone in dextrose 5% (Primacor) 20 mg/100mL infusion premix, 0.25 mcg/kg/min,  Intravenous, Continuous    cyclobenzaprine (Flexeril) tab 5 mg, 5 mg, Oral, TID PRN    [Held by provider] furosemide (Lasix) tab 40 mg, 40 mg, Oral, BID (Diuretic)    lidocaine-menthol 4-1 % patch 1 patch, 1 patch, Transdermal, Daily    ipratropium-albuterol (Duoneb) 0.5-2.5 (3) MG/3ML inhalation solution 3 mL, 3 mL, Nebulization, Q6H PRN    amiodarone (Pacerone) tab 200 mg, 200 mg, Oral, Daily    [Held by provider] carvedilol (Coreg) tab 3.125 mg, 3.125 mg, Oral, BID with meals    folic acid (Folvite) tab 800 mcg, 800 mcg, Oral, Daily    mirtazapine (Remeron) tab 7.5 mg, 7.5 mg, Oral, Nightly    pantoprazole (Protonix) DR tab 40 mg, 40 mg, Oral, BID AC    [Held by provider] sacubitril-valsartan (Entresto) 49-51 MG per tab 1 tablet, 1 tablet, Oral, BID    acetaminophen (Tylenol) tab 650 mg, 650 mg, Oral, Q4H PRN **OR** HYDROcodone-acetaminophen (Norco) 5-325 MG per tab 1 tablet, 1 tablet, Oral, Q4H PRN **OR** HYDROcodone-acetaminophen (Norco) 5-325 MG per tab 2 tablet, 2 tablet, Oral, Q4H PRN    ondansetron (Zofran) 4 MG/2ML injection 4 mg, 4 mg, Intravenous, Q6H PRN    Labs:  Lab Results   Component Value Date    WBC 6.7 05/31/2024    HGB 9.4 (L) 05/31/2024    .0 05/31/2024    BUN 45 (H) 05/31/2024     05/31/2024    K 4.0 05/31/2024    K 4.0 05/31/2024    CO2 23.0 05/31/2024    GLU 89 05/31/2024    ALB 3.6 05/30/2024    PTT 35.2 02/09/2024    INR 1.25 (H) 02/09/2024    CRP 5.10 (H) 04/23/2024    ESRML 61 (H) 04/23/2024         Neurological Exam:    Cervical exam:  Nontender to palpation throughout the cervical spine    Strength:  Right upper extremity trace triceps weakness  Otherwise 5 out of 5 throughout upper and lower extremities and symmetric    Sensation:  Intact sensation to light touch at the base and top of skull   Intact in bilateral upper and lower extremities throughout    DTRs:  Hypoactive in bilateral upper extremity biceps and brachioradialis  2+ out of 4 bilateral lower extremity  patellar    Long tract signs:  Absent clonus in bilateral lower extremities  Negative Elias in bilateral upper extremities    Abdomen:  Soft, non-distended, non-tender, with no rebound or guarding.  No peritoneal signs.   Extremities:  Non-tender, no lower extremity edema noted.        Imaging:  XR CERVICAL SPINE (4VIEWS) (CPT=72050)    Result Date: 5/30/2024  CONCLUSION:  1. Multilevel cervical spine degenerative changes, which are most pronounced at C5-C6 and C6-C7. 2. Moderate dextroscoliosis of the cervical spine. 3. Stable chronic grade I anterolisthesis of C4 relative to C5.  This does not significantly change between flexion and extension views. 4. Additional trace degenerative anterolisthesis of C3 relative to C4, which worsens on flexion and improves on extension.  As such, mild instability is suspected at C3-C4. 5. Right greater than left carotid bifurcation atherosclerosis.  Partially imaged cardiomegaly and left chest wall pacemaker electrodes.   Dictated by (CST): Aleks Diana MD on 5/30/2024 at 4:30 PM     Finalized by (CST): Aleks Diana MD on 5/30/2024 at 4:35 PM           Assessment/Plan:  Principal Problem:    Acute respiratory failure with hypoxia (HCC)  Active Problems:    Cervical pain    Cervical radiculopathy      Margaret Silverio is a(n) 79 year old female who presented to the emergency department for shortness of breath and is now being evaluated by neurosurgery for chronic neck pain.  Reviewed x-ray of the cervical spine shows multilevel spondylosis and instability at C3-4, C4-5.  Awaiting MRI C-spine.  No acute neurosurgical intervention at this time  Medical management and pain management per hospitalist  X-ray cervical flexion/extension reviewed with worsening instability at C3-4 on flexion  Awaiting MRI cervical spine.  Patient will likely follow-up on an outpatient basis for further surgical discussion.    All questions and concerns were addressed. We appreciate the  opportunity to participate in the care of this patient. Please do not hesitate to call our office ([phone]) with any issues.    Yonas Gutierrez PA-C  5/31/2024  8:58 AM

## 2024-05-31 NOTE — PROGRESS NOTES
[facility]  part of [facility]    Progress Note    Margaret Silverio Patient Status:  Inpatient    3/14/1945 MRN [id number]   Location [facility]5W Attending Raiza Alvarez MD   Hosp Day # 7 PCP Johnathon Rodriguez, DO       Subjective:   Margaret Silverio is a(n) 79 year old female who I am seeing for ckd/vol overload    C/o neck pain  Breathing is better  No new issues  On primacor  Uop slightly better 825     Objective:   /59 (BP Location: Right arm)   Pulse 60   Temp 97.4 °F (36.3 °C) (Oral)   Resp 16   Ht 5' 5\" (1.651 m)   Wt 134 lb 1.6 oz (60.8 kg)   SpO2 99%   BMI 22.32 kg/m²      Intake/Output Summary (Last 24 hours) at 2024 1358  Last data filed at 2024 0950  Gross per 24 hour   Intake 558.09 ml   Output 1000 ml   Net -441.91 ml     Wt Readings from Last 1 Encounters:   24 134 lb 1.6 oz (60.8 kg)       Exam  Vital signs: Blood pressure 115/59, pulse 60, temperature 97.4 °F (36.3 °C), temperature source Oral, resp. rate 16, height 5' 5\" (1.651 m), weight 134 lb 1.6 oz (60.8 kg), SpO2 99%.    General: No acute distress. Alert and oriented x 3.  HEENT: Moist mucous membranes. EOMI. PERRLA  Neck:  No JVD.   Respiratory: Clear to auscultation bilaterally.    Cardiovascular: S1, S2.  Regular rate and rhythm.   Abdomen: Soft, nontender, nondistended.    Neurologic: No focal neurological deficits.  Musculoskeletal: Full range of motion of all extremities.  No swelling noted.  Access:    Results:     Recent Labs   Lab 24  1750 24  0348 24  0543 24  0539 24  0457 24  0539   RBC 2.69* 2.41*   < > 2.68* 2.66* 2.93*   HGB 8.3* 7.4*   < > 8.4* 8.2* 9.4*   HCT 27.7* 23.7*   < > 26.1* 26.3* 29.2*   .0* 98.3   < > 97.4 98.9 99.7   MCH 30.9 30.7   < > 31.3 30.8 32.1   MCHC 30.0* 31.2   < > 32.2 31.2 32.2   RDW 18.7* 17.7*   < > 21.0* 21.4* 21.9*   NEPRELIM 3.67 3.33  --   --   --   --    WBC 5.3 5.0   < > 7.9 9.1 6.7    .0 273.0   < > 281.0 308.0 314.0    < > = values in this interval not displayed.         Recent Labs   Lab 05/29/24  0539 05/30/24  0457 05/31/24  0539   GLU 94 91 89   BUN 49* 42* 45*   CREATSERUM 2.39* 2.07* 1.63*   CA 9.1 8.9 9.1   * 136 138   K 4.0 3.8 4.0  4.0    105 107   CO2 21.0 21.0 23.0          XR CERVICAL SPINE (4VIEWS) (CPT=72050)    Result Date: 5/30/2024  CONCLUSION:  1. Multilevel cervical spine degenerative changes, which are most pronounced at C5-C6 and C6-C7. 2. Moderate dextroscoliosis of the cervical spine. 3. Stable chronic grade I anterolisthesis of C4 relative to C5.  This does not significantly change between flexion and extension views. 4. Additional trace degenerative anterolisthesis of C3 relative to C4, which worsens on flexion and improves on extension.  As such, mild instability is suspected at C3-C4. 5. Right greater than left carotid bifurcation atherosclerosis.  Partially imaged cardiomegaly and left chest wall pacemaker electrodes.   Dictated by (CST): Aleks Diana MD on 5/30/2024 at 4:30 PM     Finalized by (CST): Aleks Diana MD on 5/30/2024 at 4:35 PM           Assessment and Plan:     79 year old female with past medical history of HTN, CKD 3b, HFrEF (EF 15-20%) + grade 4 DD, s/p AICD, A.fib, s/p bioprosthetic MVR, RA, and h/o GIB/AVMs, who was recently admitted from 5/16-5/18 for hypotension and george/ckd. S/p IVF and cr improved. Was discharged without diuretics.  She presented serge to ER with sob/ CHF exacerbation. Started on IV lasix. Cr at baseline on admission.   B/l cr 1.3-1.6 mg/dl     Sees Dr Arvizu as outpatient    Impression:    GEORGE likely over diuresed. Hold lasix for today, bladder scan and urine sodium  CKD 3 cr at b/l 1.4-1.6 mg/dl/ monitor cr with diuresis. Grannis UA-cr bumped up with diuretics  HTN with CKD: acceptable. On coreg and entresto  Hyperkalemia s/p medical management . On entresto at home.  CHFrEF exacerbation, IV  diuretics--> transition to PO diuretics  Anemia, can be contributing to her sob. IV iron and will give ROSA + OPRBC yest      Plan:    Renal fx slightly better on primacor  Iv lasix today  IV iron,+ rosa + prbc given  Labs in am  Prob bumex 1 mg po daily on dc -- + chf clinic fu    Please avoid nsaids/toradol in mega pt      Thank you very much for allowing me to participate in the care of your patient.  If you have any questions, please do not hesitate to contact me.     Keerthi Naqvi MD

## 2024-05-31 NOTE — PROGRESS NOTES
[facility]  part of [facility]    Progress Note    Margaret Silverio Patient Status:  Inpatient    3/14/1945 MRN [id number]   Location [facility]5W Attending Regan Cruz MD   Hosp Day # 7 PCP Johnathon Rodriguez DO     Chief Complaint:   Chief Complaint   Patient presents with    Difficulty Breathing       Subjective:   Margaret Silverio is still having neck pain. No Cp no SOB. Eating well. Feels the neck collar is not fit for her and is uncomfortable.   Objective:   Objective:    Blood pressure 115/59, pulse 60, temperature 97.4 °F (36.3 °C), temperature source Oral, resp. rate 16, height 5' 5\" (1.651 m), weight 134 lb 1.6 oz (60.8 kg), SpO2 99%.    Physical Exam:    General: No acute distress.   Respiratory: Clear to auscultation bilaterally. No wheezes. No rhonchi.  Cardiovascular: S1, S2. Regular rate and rhythm. No murmurs, rubs or gallops.   Abdomen: Soft, nontender, nondistended.  Positive bowel sounds. No rebound or guarding.  Neurologic: No focal neurological deficits.   Musculoskeletal: Moves all extremities.  Extremities: No edema.      Results:   Results:    Labs:  Recent Labs   Lab 24  0543 24  0548 24  19224  0539 24  0457 24  0539   WBC 5.4 5.0  --  7.9 9.1 6.7   HGB 7.3* 7.0* 8.8* 8.4* 8.2* 9.4*   MCV 96.5 101.4*  --  97.4 98.9 99.7   .0 206.0  --  281.0 308.0 314.0       Recent Labs   Lab 24  1936 24  0348 24  0629 24  0954 24  0539 24  0457 24  0539   GLU 91 79 100*   < > 94 91 89   BUN 30* 26* 37*   < > 49* 42* 45*   CREATSERUM 1.47* 1.53* 2.18*   < > 2.39* 2.07* 1.63*   CA 9.4 9.1 8.9   < > 9.1 8.9 9.1   ALB 4.2 3.6 3.6  --   --  3.6  --    * 136 133*   < > 135* 136 138   K 5.9* 5.1 4.7   < > 4.0 3.8 4.0  4.0    107 104   < > 104 105 107   CO2 20.0* 17.0* 19.0*   < > 21.0 21.0 23.0   ALKPHO 244* 209*  --   --   --   --   --    AST 41* 32  --   --   --   --    --    ALT 29 24  --   --   --   --   --    BILT 1.0 1.1  --   --   --   --   --    TP 7.8 6.8  --   --   --   --   --     < > = values in this interval not displayed.       Estimated Creatinine Clearance: 25.6 mL/min (A) (based on SCr of 1.63 mg/dL (H)).    No results for input(s): \"PTP\", \"INR\" in the last 168 hours.         Culture:  No results found for this visit on 05/24/24.    Cardiac  No results for input(s): \"TROP\", \"PBNP\" in the last 168 hours.      Imaging: Imaging data reviewed in [facility].  XR CERVICAL SPINE (4VIEWS) (CPT=72050)    Result Date: 5/30/2024  CONCLUSION:  1. Multilevel cervical spine degenerative changes, which are most pronounced at C5-C6 and C6-C7. 2. Moderate dextroscoliosis of the cervical spine. 3. Stable chronic grade I anterolisthesis of C4 relative to C5.  This does not significantly change between flexion and extension views. 4. Additional trace degenerative anterolisthesis of C3 relative to C4, which worsens on flexion and improves on extension.  As such, mild instability is suspected at C3-C4. 5. Right greater than left carotid bifurcation atherosclerosis.  Partially imaged cardiomegaly and left chest wall pacemaker electrodes.   Dictated by (CST): Aleks Diana MD on 5/30/2024 at 4:30 PM     Finalized by (CST): Aleks Diana MD on 5/30/2024 at 4:35 PM           Medications:    furosemide  20 mg Intravenous Once    isosorbide dinitrate  20 mg Oral TID (Nitrates)    hydrALAZINE  25 mg Oral Q8H BROOKLYNN    gabapentin  300 mg Oral BID    polyethylene glycol (PEG 3350)  17 g Oral Daily    sennosides  8.6 mg Oral BID    lidocaine-menthol  1 patch Transdermal Daily    amiodarone  200 mg Oral Daily    [Held by provider] carvedilol  3.125 mg Oral BID with meals    folic acid  800 mcg Oral Daily    mirtazapine  7.5 mg Oral Nightly    pantoprazole  40 mg Oral BID AC    [Held by provider] sacubitril-valsartan  1 tablet Oral BID         Assessment and Plan:   Assessment & Plan:        Acute hypoxic  respiratory failure   Secondary to Pulmonary edema from Acute CHF and anemia.   Initially on NIPPV now on RA.   CXR pulmonary edema. BNP > 5000  Was diuresed with lasix however developed GEORGE. Now lasix on hold   Pulmonary and card on consult.   [place], Breo ellipta   Acute on chronic HFrEF, NICM   ECHO from 2/24 LVEF 15-20%   S/p AICD   GDMT: coreg, hold entresto, hydralazine.   Lasix 20mg IV x1 today   Cont milrinone to increase CO/Renal perfusion.   Was diuresed now lasix on hold with GEORGE.   GEORGE on CKD III   Baseline creat 1.4-1.6   UA bland   Likely secondary to overdiuresis vs cardiorenal syndrome from poor EF.    Renal on consult.   Hold entresto   Lasix 20mg IV x 1 today   Cont milrinone. Will prob need Bumex 1mg daily on discharge.  Labs improving.    Acute anemia  Baseline Hb 10-12 per EMR back in Feb  Iron panel ok.   Had Colonoscopy Jan 2024 found to have AVM's   No signs of blood loss at this time.   GI on consult  No overt bleeding. H/H improved today   Transfuse 1 unit PRBC   5.     Acute on Chronic neck pain with radiculopathy            Multilevel spondylosis and instability C3-4, C4-5   Gabapentin 300mg BID, norco, flexeril. Lidoderm patch   Physiatry consult possible outpt GIUSEPPE   MRI C spine pending   Neurosurgery on consult  Hard collar for instability seen on XR C spine C3-4     >55min spent, >50% spent counseling and coordinating care in the form of educating pt/family and d/w consultants and staff. Most of the time spent discussing the above plan.        Plan of care discussed with patient or family at bedside. Called isidro Landon at pt request and updated on POC     Regan Cruz MD  Hospitalist          Supplementary Documentation:     Quality:  DVT Prophylaxis: SCD hold heparin   CODE status: Full   Dispo: per clinical course           Estimated date of discharge: TBD  Discharge is dependent on: clinical stability  At this point Ms. Silverio is expected to be discharge to: home

## 2024-05-31 NOTE — DISCHARGE INSTRUCTIONS
Sometimes managing your health at home requires assistance.  The [facility] team has recognized your preference to use [employer] [facility].  They can be reached at [phone].  The fax number for your reference is [phone].  A representative from the home health agency will contact you or your family to schedule your first visit.

## 2024-05-31 NOTE — TELEPHONE ENCOUNTER
Patient requesting refill     [id number] - [place], [place] - [address] AT [facility] [address],        Medication Detail    Medication Quantity Refills Start End   HYDROcodone-acetaminophen (NORCO)  MG Oral Tab 60 tablet 0 5/15/2024 --   Sig:   Take 1 tablet by mouth every 6 (six) hours as needed for Pain.     Route:   Oral     PRN Reason(s):   Pain     Earliest Fill Date:   5/14/2024     Order #:   [id number]

## 2024-05-31 NOTE — SPIRITUAL CARE NOTE
Spiritual Care Visit Note    Patient Name: Margaret Silverio Date of Spiritual Care Visit: 24   : 3/14/1945 Primary Dx: Acute respiratory failure with hypoxia (HCC)       Referred By: Referral From: ETIENNE [facility]      Visit Type/Summary:     [facility] Writer attempted to visit but patient was busy with staff.    - Spiritual Care:  remains available as needed for follow up.    Spiritual Care support can be requested via an [facility] consult. For urgent/immediate needs, please contact the On Call  at: [place]: ext [phone]    Chaplain Albertina.

## 2024-05-31 NOTE — PHYSICAL THERAPY NOTE
PHYSICAL THERAPY TREATMENT NOTE - INPATIENT     Room Number: 512/512-A       Presenting Problem: acute respiratory failure with hypoxia       Problem List  Principal Problem:    Acute respiratory failure with hypoxia (HCC)  Active Problems:    Cervical pain    Cervical radiculopathy      PHYSICAL THERAPY ASSESSMENT   Patient demonstrates fair progress this session, goals  remain in progress.    Patient continues to function below baseline with bed mobility, transfers, gait, stair negotiation, and performing household tasks.  Contributing factors to remaining limitations include decreased functional strength, decreased endurance/aerobic capacity, impaired dynamic balance, decreased muscular endurance, and medical status.  Next session anticipate patient to progress bed mobility, transfers, and gait.  Physical Therapy will continue to follow patient for duration of hospitalization.    Patient continues to benefit from continued skilled PT services: at discharge to promote functional independence and safety with additional support and return home with home health PT.    PLAN  PT Treatment Plan: Body mechanics;Bed mobility;Endurance;Energy conservation;Patient education;Family education;Gait training;Strengthening;Balance training;Transfer training  Frequency (Obs): 5x/week    SUBJECTIVE  Agreeable to therapy     OBJECTIVE  Precautions: Bed/chair alarm        PAIN ASSESSMENT   Ratin  Location: denies  Management Techniques: Body mechanics;Activity promotion;Repositioning    BALANCE  Static Sitting: Good  Dynamic Sitting: Fair +  Static Standing: Fair  Dynamic Standing: Fair -    ACTIVITY TOLERANCE  Pulse: 65  Heart Rate Source: Monitor                     AM-PAC '6-Clicks' INPATIENT SHORT FORM - BASIC MOBILITY  How much difficulty does the patient currently have...  Patient Difficulty: Turning over in bed (including adjusting bedclothes, sheets and blankets)?: A Little   Patient Difficulty: Sitting down on and  standing up from a chair with arms (e.g., wheelchair, bedside commode, etc.): A Little   Patient Difficulty: Moving from lying on back to sitting on the side of the bed?: A Little   How much help from another person does the patient currently need...   Help from Another: Moving to and from a bed to a chair (including a wheelchair)?: A Little   Help from Another: Need to walk in hospital room?: A Little   Help from Another: Climbing 3-5 steps with a railing?: A Little     AM-PAC Score:  Raw Score: 18   Approx Degree of Impairment: 46.58%   Standardized Score (AM-PAC Scale): 43.63   CMS Modifier (G-Code): CK    FUNCTIONAL ABILITY STATUS  Functional Mobility/Gait Assessment  Gait Assistance:  (SBA)  Distance (ft): 3  Assistive Device: Rolling walker  Pattern: Shuffle (slow pace)  Supine to Sit: stand-by assist  Sit to Stand: stand-by assist with RW from elevated bed height    Skilled Therapy Provided: Son at bedside. Pt agreeable to therapy, identified by name and , gait belt donned for mobility. Pt denies pain this date. Agreeable to transfer to chair to continue eating breakfast. Pt grossly SBA for mobility and short distance in room. Encouraged pt to amb later with staff as able to further improve activity tolerance and endurance.     The patient's Approx Degree of Impairment: 46.58% has been calculated based on documentation in the [facility] '6 clicks' Inpatient Daily Activity Short Form.  Research supports that patients with this level of impairment may benefit from HHPT.  Final disposition will be made by interdisciplinary medical team.        Patient End of Session: Up in chair;Needs met;Call light within reach;RN aware of session/findings;Family present    CURRENT GOALS   Goals to be met by: 24  Patient Goal Patient's self-stated goal is: go home   Goal #1 Patient is able to demonstrate supine - sit EOB @ level: supervision      Goal #1   Current Status SBA   Goal #2 Patient is able to demonstrate transfers  Sit to/from Stand at assistance level: supervision with walker - rolling      Goal #2  Current Status SBA with RW   Goal #3 Patient is able to ambulate 150 feet with assist device: walker - rolling at assistance level: supervision   Goal #3   Current Status 3 ft with RW, SBA   Goal #4     Goal #4   Current Status     Goal #5 Patient to demonstrate independence with home activity/exercise instructions provided to patient in preparation for discharge.   Goal #5   Current Status  in progress   Goal #6     Goal #6  Current Status         Therapeutic Activity: 15 minutes

## 2024-05-31 NOTE — PROGRESS NOTES
[facility]  part of [facility]    Progress Note    Margaret Silverio Patient Status:  Inpatient    3/14/1945 MRN [id number]   Location [facility]5W Attending Regan Cruz MD   Hosp Day # 7 PCP Johnathon Rodriguez DO         Subjective:   Subjective:  Seen and examined   Up to chair on room air with no cough or dyspnea   Objective:   Blood pressure 101/58, pulse 60, temperature 98 °F (36.7 °C), temperature source Oral, resp. rate 14, height 5' 5\" (1.651 m), weight 134 lb 1.6 oz (60.8 kg), SpO2 96%.  Physical Exam  Constitutional:       General: She is not in acute distress.  HENT:      Head: Atraumatic.   Cardiovascular:      Rate and Rhythm: Normal rate.      Heart sounds:      No gallop.   Pulmonary:      Effort: No respiratory distress.      Breath sounds: No stridor. No wheezing, rhonchi or rales.   Abdominal:      Palpations: Abdomen is soft.   Neurological:      Mental Status: Mental status is at baseline.         Results:   Lab Results   Component Value Date    WBC 6.7 2024    HGB 9.4 (L) 2024    HCT 29.2 (L) 2024    .0 2024    CREATSERUM 1.63 (H) 2024    BUN 45 (H) 2024     2024    K 4.0 2024    K 4.0 2024     2024    CO2 23.0 2024    GLU 89 2024    CA 9.1 2024    ALB 3.6 2024    ALKPHO 209 (H) 2024    BILT 1.1 2024    TP 6.8 2024    AST 32 2024    ALT 24 2024    PTT 35.2 2024    INR 1.25 (H) 2024    T4F 1.2 2024    TSH 10.681 (H) 2024    LIP 32 2024    ESRML 61 (H) 2024    CRP 5.10 (H) 2024    MG 2.2 2024    PHOS 3.7 2024    TROPHS 24 2024    B12 >2,000 (H) 2024       XR CERVICAL SPINE (4VIEWS) (CPT=72050)    Result Date: 2024  CONCLUSION:  1. Multilevel cervical spine degenerative changes, which are most pronounced at C5-C6 and C6-C7. 2. Moderate dextroscoliosis of  the cervical spine. 3. Stable chronic grade I anterolisthesis of C4 relative to C5.  This does not significantly change between flexion and extension views. 4. Additional trace degenerative anterolisthesis of C3 relative to C4, which worsens on flexion and improves on extension.  As such, mild instability is suspected at C3-C4. 5. Right greater than left carotid bifurcation atherosclerosis.  Partially imaged cardiomegaly and left chest wall pacemaker electrodes.   Dictated by (CST): Aleks Diana MD on 5/30/2024 at 4:30 PM     Finalized by (CST): Aleks Diana MD on 5/30/2024 at 4:35 PM               Assessment & Plan:     1- acute on chronic HFrEF  LVEf 15 %   CXR moderate edema with a trace basilar effusion and atelectasis  Very high BNP   Cardiology following     Pulmonary status Much better overall today on room air     2-severe rheumatoid arthritis with joint deformities     3-DVT prophylaxis  Heparin subcu    Will sign off             Linsey Liao MD  5/31/2024

## 2024-05-31 NOTE — CM/SW NOTE
Pt now agreeable to HH at [place] and she has chosen [facility] [place]. Agency reserved in [facility] and notified of possible weekend discharge.    Plan: Home w/spouse with [facility] pending medical clearance.    Ayush Silverio, EVAN    [phone]

## 2024-06-01 LAB
ALBUMIN SERPL-MCNC: 3.5 G/DL (ref 3.2–4.8)
ANION GAP SERPL CALC-SCNC: 7 MMOL/L (ref 0–18)
BUN BLD-MCNC: 38 MG/DL (ref 9–23)
BUN/CREAT SERPL: 25.3 (ref 10–20)
CALCIUM BLD-MCNC: 8.9 MG/DL (ref 8.7–10.4)
CHLORIDE SERPL-SCNC: 108 MMOL/L (ref 98–112)
CO2 SERPL-SCNC: 22 MMOL/L (ref 21–32)
CREAT BLD-MCNC: 1.5 MG/DL
DEPRECATED RDW RBC AUTO: 78.7 FL (ref 35.1–46.3)
EGFRCR SERPLBLD CKD-EPI 2021: 35 ML/MIN/1.73M2 (ref 60–?)
ERYTHROCYTE [DISTWIDTH] IN BLOOD BY AUTOMATED COUNT: 21.2 % (ref 11–15)
GLUCOSE BLD-MCNC: 86 MG/DL (ref 70–99)
HCT VFR BLD AUTO: 31.1 %
HGB BLD-MCNC: 9.4 G/DL
MCH RBC QN AUTO: 31 PG (ref 26–34)
MCHC RBC AUTO-ENTMCNC: 30.2 G/DL (ref 31–37)
MCV RBC AUTO: 102.6 FL
OSMOLALITY SERPL CALC.SUM OF ELEC: 292 MOSM/KG (ref 275–295)
PHOSPHATE SERPL-MCNC: 3.6 MG/DL (ref 2.4–5.1)
PLATELET # BLD AUTO: 361 10(3)UL (ref 150–450)
POTASSIUM SERPL-SCNC: 4.4 MMOL/L (ref 3.5–5.1)
RBC # BLD AUTO: 3.03 X10(6)UL
SODIUM SERPL-SCNC: 137 MMOL/L (ref 136–145)
WBC # BLD AUTO: 6.7 X10(3) UL (ref 4–11)

## 2024-06-01 PROCEDURE — 99233 SBSQ HOSP IP/OBS HIGH 50: CPT | Performed by: INTERNAL MEDICINE

## 2024-06-01 PROCEDURE — 99233 SBSQ HOSP IP/OBS HIGH 50: CPT | Performed by: HOSPITALIST

## 2024-06-01 RX ORDER — FUROSEMIDE 10 MG/ML
40 INJECTION INTRAMUSCULAR; INTRAVENOUS
Status: DISCONTINUED | OUTPATIENT
Start: 2024-06-01 | End: 2024-06-01

## 2024-06-01 RX ORDER — FUROSEMIDE 40 MG/1
40 TABLET ORAL
Status: DISCONTINUED | OUTPATIENT
Start: 2024-06-01 | End: 2024-06-01

## 2024-06-01 RX ORDER — BUMETANIDE 1 MG/1
1 TABLET ORAL DAILY
Status: DISCONTINUED | OUTPATIENT
Start: 2024-06-01 | End: 2024-06-03

## 2024-06-01 RX ORDER — FUROSEMIDE 10 MG/ML
40 INJECTION INTRAMUSCULAR; INTRAVENOUS ONCE
Status: DISCONTINUED | OUTPATIENT
Start: 2024-06-01 | End: 2024-06-01

## 2024-06-01 NOTE — PROGRESS NOTES
[facility]  part of [facility]    Progress Note    Margaret Silverio Patient Status:  Inpatient    3/14/1945 MRN [id number]   Location [facility]5W Attending Regan Cruz MD   Hosp Day # 8 PCP Johnathon Rodriguez DO     Chief Complaint:   Chief Complaint   Patient presents with    Difficulty Breathing       Subjective:   Margaret Silverio is doing better. Up to chair. Neck pain improved. No SOB. No CP. Family at bedside.   Objective:   Objective:    Blood pressure 106/52, pulse 60, temperature 99 °F (37.2 °C), temperature source Oral, resp. rate 18, height 5' 5\" (1.651 m), weight 129 lb 12.8 oz (58.9 kg), SpO2 96%.    Physical Exam:    General: No acute distress. Hard collar in place  Respiratory: Clear to auscultation bilaterally. No wheezes. No rhonchi.  Cardiovascular: S1, S2. Regular rate and rhythm. No murmurs, rubs or gallops.   Abdomen: Soft, nontender, nondistended.  Positive bowel sounds. No rebound or guarding.  Neurologic: No focal neurological deficits.   Musculoskeletal: Moves all extremities.  Extremities: No edema.      Results:   Results:    Labs:  Recent Labs   Lab 24  0548 24  1921 24  0539 24  0457 24  0539 24  0453   WBC 5.0  --  7.9 9.1 6.7 6.7   HGB 7.0* 8.8* 8.4* 8.2* 9.4* 9.4*   .4*  --  97.4 98.9 99.7 102.6*   .0  --  281.0 308.0 314.0 361.0       Recent Labs   Lab 24  0629 24  0954 24  0457 24  0539 24  0453   *   < > 91 89 86   BUN 37*   < > 42* 45* 38*   CREATSERUM 2.18*   < > 2.07* 1.63* 1.50*   CA 8.9   < > 8.9 9.1 8.9   ALB 3.6  --  3.6  --  3.5   *   < > 136 138 137   K 4.7   < > 3.8 4.0  4.0 4.4      < > 105 107 108   CO2 19.0*   < > 21.0 23.0 22.0    < > = values in this interval not displayed.       Estimated Creatinine Clearance: 27.8 mL/min (A) (based on SCr of 1.5 mg/dL (H)).    No results for input(s): \"PTP\", \"INR\" in the last 168  hours.         Culture:  No results found for this visit on 05/24/24.    Cardiac  No results for input(s): \"TROP\", \"PBNP\" in the last 168 hours.      Imaging: Imaging data reviewed in [facility].  XR CERVICAL SPINE (4VIEWS) (CPT=72050)    Result Date: 5/30/2024  CONCLUSION:  1. Multilevel cervical spine degenerative changes, which are most pronounced at C5-C6 and C6-C7. 2. Moderate dextroscoliosis of the cervical spine. 3. Stable chronic grade I anterolisthesis of C4 relative to C5.  This does not significantly change between flexion and extension views. 4. Additional trace degenerative anterolisthesis of C3 relative to C4, which worsens on flexion and improves on extension.  As such, mild instability is suspected at C3-C4. 5. Right greater than left carotid bifurcation atherosclerosis.  Partially imaged cardiomegaly and left chest wall pacemaker electrodes.   Dictated by (CST): Aleks Diana MD on 5/30/2024 at 4:30 PM     Finalized by (CST): Aleks Diaan MD on 5/30/2024 at 4:35 PM           Medications:    bumetanide  1 mg Oral Daily    isosorbide dinitrate  20 mg Oral TID (Nitrates)    hydrALAZINE  25 mg Oral Q8H BROOKLYNN    gabapentin  300 mg Oral BID    polyethylene glycol (PEG 3350)  17 g Oral Daily    sennosides  8.6 mg Oral BID    lidocaine-menthol  1 patch Transdermal Daily    amiodarone  200 mg Oral Daily    carvedilol  3.125 mg Oral BID with meals    folic acid  800 mcg Oral Daily    mirtazapine  7.5 mg Oral Nightly    pantoprazole  40 mg Oral BID AC    sacubitril-valsartan  1 tablet Oral BID         Assessment and Plan:   Assessment & Plan:        Acute hypoxic respiratory failure - resolved.   Secondary to Pulmonary edema from Acute CHF and anemia.   Initially on NIPPV now on RA.   CXR pulmonary edema. BNP > 5000  Was diuresed with lasix however developed GEORGE. Better   Pulmonary and cards on consult.   Dana Blackman   Acute on chronic HFrEF, NICM   ECHO from 2/24 LVEF 15-20%   S/p AICD   GDMT: coreg,  resume entresto, hydralazine.   Received Lasix 40mg IV x 1 today   Bumex 1mg PO daily start tomorrow.   Cont milrinone wean per cards.   GEORGE on CKD III   Baseline creat 1.4-1.6   Improved.   Likely secondary to overdiuresis vs cardiorenal syndrome from poor EF.    Renal on consult.   Cont milrinone wean per cards.   Labs improving.    Acute anemia  Baseline Hb 10-12 per EMR back in Feb  Iron panel ok.   Had Colonoscopy Jan 2024 found to have AVM's   No signs of blood loss at this time.   GI on consult  No overt bleeding. H/H improved today   Transfuse 1 unit PRBC   5.     Acute on Chronic neck pain with radiculopathy            Multilevel spondylosis and instability C3-4, C4-5   Gabapentin 300mg BID, norco, flexeril. Lidoderm patch   Physiatry consult possible outpt GIUSEPPE   MRI C spine pending   Neurosurgery on consult  Hard collar for instability seen on XR C spine C3-4     >55min spent, >50% spent counseling and coordinating care in the form of educating pt/family and d/w consultants and staff. Most of the time spent discussing the above plan.        Plan of care discussed with patient or family at bedside. Called isidro Landon at pt request and updated on POC     Regan Cruz MD  Hospitalist          Supplementary Documentation:     Quality:  DVT Prophylaxis: SCD hold heparin   CODE status: Full   Dispo: per clinical course           Estimated date of discharge: TBD  Discharge is dependent on: clinical stability  At this point Ms. Silverio is expected to be discharge to: home

## 2024-06-01 NOTE — PROGRESS NOTES
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  Cervical Aspen collar in place.  Patient reports no new upper extremity symptoms including numbness, tingling, pain, weakness.  Has been up and ambulating without difficulty.  Alert, orientated x3.  Cooperative.  No apparent distress.    Awaiting MRI C-spine 6/3 due to ICD.    Vital Signs:    Temp:  [97 °F (36.1 °C)-98 °F (36.7 °C)] 97.5 °F (36.4 °C)  Pulse:  [60-61] 60  Resp:  [14-22] 20  BP: (101-135)/(55-73) 120/65  SpO2:  [96 %-100 %] 99 %    I/O:  I/O last 3 completed shifts:  In: 758.1 [P.O.:650; I.V.:108.1]  Out: 1500 [Urine:1500]    Inpatient Medications:    Current Facility-Administered Medications:     furosemide (Lasix) tab 40 mg, 40 mg, Oral, BID (Diuretic)    acetaminophen (Tylenol Extra Strength) tab 500 mg, 500 mg, Oral, Q4H PRN    butalbital-acetaminophen-caffeine (Fioricet) -40 MG per tab 1 tablet, 1 tablet, Oral, Q4H PRN    isosorbide dinitrate (Isordil) tab 20 mg, 20 mg, Oral, TID (Nitrates)    hydrALAZINE (Apresoline) tab 25 mg, 25 mg, Oral, Q8H BROOKLYNN    gabapentin (Neurontin) cap 300 mg, 300 mg, Oral, BID    polyethylene glycol (PEG 3350) (Miralax) 17 g oral packet 17 g, 17 g, Oral, Daily    sennosides (Senokot) tab 8.6 mg, 8.6 mg, Oral, BID    milrinone in dextrose 5% (Primacor) 20 mg/100mL infusion premix, 0.25 mcg/kg/min, Intravenous, Continuous    cyclobenzaprine (Flexeril) tab 5 mg, 5 mg, Oral, TID PRN    lidocaine-menthol 4-1 % patch 1 patch, 1 patch, Transdermal, Daily    ipratropium-albuterol (Duoneb) 0.5-2.5 (3) MG/3ML inhalation solution 3 mL, 3 mL, Nebulization, Q6H PRN    amiodarone  (Pacerone) tab 200 mg, 200 mg, Oral, Daily    carvedilol (Coreg) tab 3.125 mg, 3.125 mg, Oral, BID with meals    folic acid (Folvite) tab 800 mcg, 800 mcg, Oral, Daily    mirtazapine (Remeron) tab 7.5 mg, 7.5 mg, Oral, Nightly    pantoprazole (Protonix) DR tab 40 mg, 40 mg, Oral, BID AC    sacubitril-valsartan (Entresto) 49-51 MG per tab 1 tablet, 1 tablet, Oral, BID    acetaminophen (Tylenol) tab 650 mg, 650 mg, Oral, Q4H PRN **OR** HYDROcodone-acetaminophen (Norco) 5-325 MG per tab 1 tablet, 1 tablet, Oral, Q4H PRN **OR** HYDROcodone-acetaminophen (Norco) 5-325 MG per tab 2 tablet, 2 tablet, Oral, Q4H PRN    ondansetron (Zofran) 4 MG/2ML injection 4 mg, 4 mg, Intravenous, Q6H PRN    Labs:  Lab Results   Component Value Date    WBC 6.7 06/01/2024    HGB 9.4 (L) 06/01/2024    .0 06/01/2024    BUN 38 (H) 06/01/2024     06/01/2024    K 4.4 06/01/2024    CO2 22.0 06/01/2024    GLU 86 06/01/2024    ALB 3.5 06/01/2024    PTT 35.2 02/09/2024    INR 1.25 (H) 02/09/2024    CRP 5.10 (H) 04/23/2024    ESRML 61 (H) 04/23/2024         Neurological Exam:  Strength:  Right upper extremity trace triceps weakness  Otherwise 5-5 in upper and lower extremities and symmetric    Sensation:  Intact sensation to light touch at base and top of skull  Intact in bilateral upper and lower extremities throughout    DTRs:  Hypoactive bilateral upper extremity biceps and brachioradialis  2+ out of 4 bilateral lower extremity patellar    Long tract signs:  Absent clonus in bilateral lower extremities  Negative Elias in bilateral upper extremities    Abdomen:  Soft, non-distended, non-tender, with no rebound or guarding.  No peritoneal signs.   Extremities:  Non-tender, no lower extremity edema noted.        Imaging:  No results found.    Assessment/Plan:  Principal Problem:    Acute respiratory failure with hypoxia (HCC)  Active Problems:    Cervical pain    Cervical radiculopathy      Margaret Silverio is a(n) 79 year old  female with dynamic instability at C3-4 on x-ray.  Awaiting MRI cervical spine.  No acute changes overnight  Cervical collar in place to be worn at all times  PT/OT  Neurochecks every 4 hours  Advance diet as tolerated  DVT prophylaxis SCDs  Further recommendations to be provided once MRI C-spine completed    All questions and concerns were addressed. We appreciate the opportunity to participate in the care of this patient. Please do not hesitate to call our office ([phone]) with any issues.    Yonas Gutierrez PA-C  6/1/2024  10:53 AM

## 2024-06-01 NOTE — PROGRESS NOTES
Progress Note  Margaret Silverio Patient Status:  Inpatient    3/14/1945 MRN [id number]   Location [facility]5W Attending Regan Cruz MD   Hosp Day # 8 PCP Johnathon Rodriguez,      Subjective:  Sleeping, denies cardiac  complaints, states headache is better today    Objective:  /65 (BP Location: Right arm)   Pulse 60   Temp 97 °F (36.1 °C) (Axillary)   Resp 18   Ht 5' 5\" (1.651 m)   Wt 138 lb 11.2 oz (62.9 kg)   SpO2 100%   BMI 23.08 kg/m²     Telemetry: AV paced 60    Intake/Output:    Intake/Output Summary (Last 24 hours) at 2024 0717  Last data filed at 2024 2203  Gross per 24 hour   Intake 500 ml   Output 800 ml   Net -300 ml     Last 3 Weights   24 0548 138 lb 11.2 oz (62.9 kg)   24 0554 134 lb 1.6 oz (60.8 kg)   24 0440 129 lb 4.8 oz (58.7 kg)   24 0631 128 lb 8 oz (58.3 kg)   24 0534 127 lb (57.6 kg)   24 0642 130 lb 8 oz (59.2 kg)   24 0500 124 lb 12.8 oz (56.6 kg)   24 0504 123 lb 10.9 oz (56.1 kg)   24 2241 123 lb 14.4 oz (56.2 kg)   24 1724 125 lb 10.6 oz (57 kg)   24 1704 129 lb 6.6 oz (58.7 kg)   24 0633 127 lb (57.6 kg)   24 0510 119 lb 8 oz (54.2 kg)   24 0500 129 lb 12.8 oz (58.9 kg)     Labs:  Recent Labs   Lab 24  0457 24  0539 24  0453   GLU 91 89 86   BUN 42* 45* 38*   CREATSERUM 2.07* 1.63* 1.50*   EGFRCR 24* 32* 35*   CA 8.9 9.1 8.9    138 137   K 3.8 4.0  4.0 4.4    107 108   CO2 21.0 23.0 22.0     Recent Labs   Lab 247 24  0539 243   RBC 2.66* 2.93* 3.03*   HGB 8.2* 9.4* 9.4*   HCT 26.3* 29.2* 31.1*   MCV 98.9 99.7 102.6*   MCH 30.8 32.1 31.0   MCHC 31.2 32.2 30.2*   RDW 21.4* 21.9* 21.2*   WBC 9.1 6.7 6.7   .0 314.0 361.0     No results for input(s): \"TROP\", \"TROPHS\", \"CK\" in the last 168 hours.  Lab Results   Component Value Date/Time    HDL 54 02/10/2024 12:38 PM    LDL 63 02/10/2024 12:38 PM     TRIG 91 02/10/2024 12:38 PM     No results found for: \"DDIMER\"  Lab Results   Component Value Date    TSH 10.681 (H) 02/03/2024     Review of Systems:   Constitutional: No fevers, chills, fatigue or night sweats.  ENT: No mouth pain, neck pain, running nose, headaches or swollen glands.  Skin: No rashes, pruritus or skin changes,  Respiratory: Denies cough, wheezing or shortness of breath.  CV: Denies chest pain, palpitations, orthopnea, PND or dizziness.  Musculoskeletal: No joint pain, stiffness or swelling.  GI: No nausea, vomiting or diarrhea. No blood in stools.  Neurologic: No seizures, tremors, weakness or numbness.     Physical Exam:  General: Alert, cooperative, no distress, appears stated age.  Neck: Supple, symmetrical, trachea midline, no adenopathy, thyroid: no enlargment/tenderness/nodules, no carotid bruit and no JVD.  Lungs: diminished bilaterally.  Chest wall: No tenderness or deformity.  Heart: Regular rate and rhythm, S1, S2 normal, no murmur, click, rub or gallop.  Abdomen: Soft, non-tender. Bowel sounds normal. No masses,  No organomegaly.  Extremities: Extremities normal, atraumatic, no cyanosis or edema.  Pulses: 2+ and symmetric all extremities.  Neurologic: Grossly intact.    Medications:   isosorbide dinitrate  20 mg Oral TID (Nitrates)    hydrALAZINE  25 mg Oral Q8H BROOKLYNN    gabapentin  300 mg Oral BID    polyethylene glycol (PEG 3350)  17 g Oral Daily    sennosides  8.6 mg Oral BID    lidocaine-menthol  1 patch Transdermal Daily    amiodarone  200 mg Oral Daily    [Held by provider] carvedilol  3.125 mg Oral BID with meals    folic acid  800 mcg Oral Daily    mirtazapine  7.5 mg Oral Nightly    pantoprazole  40 mg Oral BID AC    [Held by provider] sacubitril-valsartan  1 tablet Oral BID      milrinone in dextrose 5% 0.25 mcg/kg/min (05/31/24 1606)     Assessment:    Acute on chronic HFrEF  Admitted with increasing shortness of breath  -LVEF 15-20% on echo in February  -diuresing with IV  bumex burst dosing, minimal UOP yesterday with 20mg dose (800mL)  -milrinone gtt, cr improving with gtt  -I/Os net post 2.5L, weight up 15# from admission, 4# overnight, states dry weight is 150, admission weight 123#, does not appear grossly overloaded on exam  -BNP >5000 on admission  -GDMT: BB, hydralazine, isordil, ARNI  -coreg and entresto held yesterday for hypotension, BP stable today, will resume  -heart failure clinic follow up after discharge  -[place] DC-ICD (9/2023) due to persistent low EF <35% despite GDMT    Severe Tricuspid Regurgitation  Diuresing with IV lasix  -increase dose today, UOP minimal yesterday  -monitor strict I/Os, daily weight, daily BMP    Severe mitral regurgitation   S/p MVR    HTN  BP low 100s yesterday, held entresto, coreg  -currently normotensive, resume GDMT    Hx PAF/Atrial flutter  Currently AV Paced on tele  -coreg, amiodarone  -s/p Watchman    GEORGE on CKD  Baseline cr 1.4-1.6, up to 2.4 this admission, now back to baseline today at 1.5  -milrinone gtt, IVP dosing lasix  -nephrology following    Headache/cervical pain  Neurosurgery following  -C-spine MRI to be done on Monday when ICD rep available for reprogramming, MRI form completed by device clinic    Acute on chronic Anemia  Hgb stable ~9    Seizure disorder    Rheumatoid Arthritis    Hx GIB  S/p watchman       Plan:  -Continue milrinone gtt, cr/BP improved, needs further diuresis  -Resume coreg, entresto  -Give IV lasix 40mg once, monitor strict I/Os, daily weights, daily BMP  -Continue isordil, hydralazine-hold for SBP <110, would prefer holding these over coreg and entresto if BP a concern  -C-spine to be done on Monday, will assess milrinone at that time, may be able to hold for the procedure    Plan of care discussed with patient, RN.    Delisa Bey, APRN  6/1/2024  7:17 AM  [phone] [place]  [phone] [place]      I saw and examined the patient agree with attached findings.  Appears clinically  euvolemic without any shortness of breath or lower extremity edema.  Creatinine back to baseline at 1.5.  We will wean milrinone eventually discontinue, will put her back on Lasix 40 mg p.o. twice daily.  Continue current GDMT.  Plan for MRI on Monday when ICD rep is available.    Ayush Spangler MD  [facility] [place]

## 2024-06-01 NOTE — PLAN OF CARE
Pt has increased pain overnight, collar remained in place, plan is for MRI on Monday, ICD rep will be bedside for MRI. Fall precautions in place. Pt calls appropriately.       Problem: Patient Centered Care  Goal: Patient preferences are identified and integrated in the patient's plan of care  Description: Interventions:  - What would you like us to know as we care for you? From home with   - Provide timely, complete, and accurate information to patient/family  - Incorporate patient and family knowledge, values, beliefs, and cultural backgrounds into the planning and delivery of care  - Encourage patient/family to participate in care and decision-making at the level they choose  - Honor patient and family perspectives and choices  Outcome: Progressing     Problem: Patient/Family Goals  Goal: Patient/Family Long Term Goal  Description: Patient's Long Term Goal: discharge    Interventions:  - Monitor vital signs, labs, test results  - Oxygen and respiratory therapy as needed  - Pain management  - Follow MD orders  - Administer medications per MD order  - Diagnostics per order  - Update /  inform patient on plan of care  - Discharge planning  - See additional Care Plan goals for specific interventions  Outcome: Progressing  Goal: Patient/Family Short Term Goal  Description: Patient's Short Term Goal: correct electrolytes    Interventions:   - Nephro recommendations  - Follow MD orders  - Take meds as necessary for electrolyte replacement  - See additional Care Plan goals for specific interventions  Outcome: Progressing     Problem: RESPIRATORY - ADULT  Goal: Achieves optimal ventilation and oxygenation  Description: INTERVENTIONS:  - Assess for changes in respiratory status  - Assess for changes in mentation and behavior  - Position to facilitate oxygenation and minimize respiratory effort  - Oxygen supplementation based on oxygen saturation or ABGs  - Provide Smoking Cessation handout, if applicable  -  Encourage broncho-pulmonary hygiene including cough, deep breathe, Incentive Spirometry  - Assess the need for suctioning and perform as needed  - Assess and instruct to report SOB or any respiratory difficulty  - Respiratory Therapy support as indicated  - Manage/alleviate anxiety  - Monitor for signs/symptoms of CO2 retention  Outcome: Progressing     Problem: CARDIOVASCULAR - ADULT  Goal: Maintains optimal cardiac output and hemodynamic stability  Description: INTERVENTIONS:  - Monitor vital signs, rhythm, and trends  - Monitor for bleeding, hypotension and signs of decreased cardiac output  - Evaluate effectiveness of vasoactive medications to optimize hemodynamic stability  - Monitor arterial and/or venous puncture sites for bleeding and/or hematoma  - Assess quality of pulses, skin color and temperature  - Assess for signs of decreased coronary artery perfusion - ex. Angina  - Evaluate fluid balance, assess for edema, trend weights  Outcome: Progressing  Goal: Absence of cardiac arrhythmias or at baseline  Description: INTERVENTIONS:  - Continuous cardiac monitoring, monitor vital signs, obtain 12 lead EKG if indicated  - Evaluate effectiveness of antiarrhythmic and heart rate control medications as ordered  - Initiate emergency measures for life threatening arrhythmias  - Monitor electrolytes and administer replacement therapy as ordered  Outcome: Progressing     Problem: GASTROINTESTINAL - ADULT  Goal: Maintains or returns to baseline bowel function  Description: INTERVENTIONS:  - Assess bowel function  - Maintain adequate hydration with IV or PO as ordered and tolerated  - Evaluate effectiveness of GI medications  - Encourage mobilization and activity  - Obtain nutritional consult as needed  - Establish a toileting routine/schedule  - Consider collaborating with pharmacy to review patient's medication profile  Outcome: Progressing     Problem: GENITOURINARY - ADULT  Goal: Absence of urinary  retention  Description: INTERVENTIONS:  - Assess patient’s ability to void and empty bladder  - Monitor intake/output and perform bladder scan as needed  - Follow urinary retention protocol/standard of care  - Consider collaborating with pharmacy to review patient's medication profile  - Implement strategies to promote bladder emptying  Outcome: Progressing     Problem: PAIN - ADULT  Goal: Verbalizes/displays adequate comfort level or patient's stated pain goal  Description: INTERVENTIONS:  - Encourage pt to monitor pain and request assistance  - Assess pain using appropriate pain scale  - Administer analgesics based on type and severity of pain and evaluate response  - Implement non-pharmacological measures as appropriate and evaluate response  - Consider cultural and social influences on pain and pain management  - Manage/alleviate anxiety  - Utilize distraction and/or relaxation techniques  - Monitor for opioid side effects  - Notify MD/LIP if interventions unsuccessful or patient reports new pain  - Anticipate increased pain with activity and pre-medicate as appropriate  Outcome: Progressing

## 2024-06-01 NOTE — PLAN OF CARE
A&Ox4. Pt ambulates with walker, milrinone infusing, voiding via purewick, tolerating,  on room air tele monitoring in place, PRN medications per MAR provided as needed for pain. PRN neb treatment for wheezing. Frequent rounding by nursing staff. Cervical collar in place. Safety precautions maintained/call light within reach. Plan MRI of cervical spine on Monday at 9 am.   Problem: Patient Centered Care  Goal: Patient preferences are identified and integrated in the patient's plan of care  Description: Interventions:  - What would you like us to know as we care for you? From home with   - Provide timely, complete, and accurate information to patient/family  - Incorporate patient and family knowledge, values, beliefs, and cultural backgrounds into the planning and delivery of care  - Encourage patient/family to participate in care and decision-making at the level they choose  - Honor patient and family perspectives and choices  Outcome: Progressing     Problem: RESPIRATORY - ADULT  Goal: Achieves optimal ventilation and oxygenation  Description: INTERVENTIONS:  - Assess for changes in respiratory status  - Assess for changes in mentation and behavior  - Position to facilitate oxygenation and minimize respiratory effort  - Oxygen supplementation based on oxygen saturation or ABGs  - Provide Smoking Cessation handout, if applicable  - Encourage broncho-pulmonary hygiene including cough, deep breathe, Incentive Spirometry  - Assess the need for suctioning and perform as needed  - Assess and instruct to report SOB or any respiratory difficulty  - Respiratory Therapy support as indicated  - Manage/alleviate anxiety  - Monitor for signs/symptoms of CO2 retention  Outcome: Progressing     Problem: CARDIOVASCULAR - ADULT  Goal: Maintains optimal cardiac output and hemodynamic stability  Description: INTERVENTIONS:  - Monitor vital signs, rhythm, and trends  - Monitor for bleeding, hypotension and signs of decreased  cardiac output  - Evaluate effectiveness of vasoactive medications to optimize hemodynamic stability  - Monitor arterial and/or venous puncture sites for bleeding and/or hematoma  - Assess quality of pulses, skin color and temperature  - Assess for signs of decreased coronary artery perfusion - ex. Angina  - Evaluate fluid balance, assess for edema, trend weights  Outcome: Progressing  Goal: Absence of cardiac arrhythmias or at baseline  Description: INTERVENTIONS:  - Continuous cardiac monitoring, monitor vital signs, obtain 12 lead EKG if indicated  - Evaluate effectiveness of antiarrhythmic and heart rate control medications as ordered  - Initiate emergency measures for life threatening arrhythmias  - Monitor electrolytes and administer replacement therapy as ordered  Outcome: Progressing     Problem: CARDIOVASCULAR - ADULT  Goal: Absence of cardiac arrhythmias or at baseline  Description: INTERVENTIONS:  - Continuous cardiac monitoring, monitor vital signs, obtain 12 lead EKG if indicated  - Evaluate effectiveness of antiarrhythmic and heart rate control medications as ordered  - Initiate emergency measures for life threatening arrhythmias  - Monitor electrolytes and administer replacement therapy as ordered  Outcome: Progressing     Problem: GASTROINTESTINAL - ADULT  Goal: Maintains or returns to baseline bowel function  Description: INTERVENTIONS:  - Assess bowel function  - Maintain adequate hydration with IV or PO as ordered and tolerated  - Evaluate effectiveness of GI medications  - Encourage mobilization and activity  - Obtain nutritional consult as needed  - Establish a toileting routine/schedule  - Consider collaborating with pharmacy to review patient's medication profile  Outcome: Progressing     Problem: GENITOURINARY - ADULT  Goal: Absence of urinary retention  Description: INTERVENTIONS:  - Assess patient’s ability to void and empty bladder  - Monitor intake/output and perform bladder scan as  needed  - Follow urinary retention protocol/standard of care  - Consider collaborating with pharmacy to review patient's medication profile  - Implement strategies to promote bladder emptying  Outcome: Progressing     Problem: PAIN - ADULT  Goal: Verbalizes/displays adequate comfort level or patient's stated pain goal  Description: INTERVENTIONS:  - Encourage pt to monitor pain and request assistance  - Assess pain using appropriate pain scale  - Administer analgesics based on type and severity of pain and evaluate response  - Implement non-pharmacological measures as appropriate and evaluate response  - Consider cultural and social influences on pain and pain management  - Manage/alleviate anxiety  - Utilize distraction and/or relaxation techniques  - Monitor for opioid side effects  - Notify MD/LIP if interventions unsuccessful or patient reports new pain  - Anticipate increased pain with activity and pre-medicate as appropriate  Outcome: Progressing

## 2024-06-01 NOTE — PROGRESS NOTES
[facility]  [facility]    Progress Note      Subjective:     Patient lying comfortably.  Currently on milrinone gtt.  Family at bedside.  Denies any chest pain or abdominal pain. Improve breathing      Review of Systems:   Constitutional: negative for fatigue, fevers and weight loss  Eyes: negative for irritation, redness and visual disturbance  Ears, nose, mouth, throat, and face: negative for hearing loss and sore throat  Respiratory: negative for cough, hemoptysis and wheezing  Cardiovascular: negative for chest pain, exertional dyspnea, lower extremity edema and palpitations  Gastrointestinal: negative for abdominal pain, diarrhea and nausea  Genitourinary:negative for dysuria, frequency and hematuria  Hematologic/lymphatic: negative for bleeding and easy bruising  Musculoskeletal:negative for back pain, bone pain and muscle weakness  Neurological: negative for gait problems, memory problems and seizures  Behavioral/Psych: negative for anxiety and depression      Objective:   Temp:  [97 °F (36.1 °C)-99 °F (37.2 °C)] 98.2 °F (36.8 °C)  Pulse:  [60-61] 60  Resp:  [16-22] 16  BP: (106-146)/(48-75) 146/75  SpO2:  [96 %-100 %] 97 %  SpO2: 97 %     Intake/Output Summary (Last 24 hours) at 6/1/2024 1714  Last data filed at 6/1/2024 1500  Gross per 24 hour   Intake 820.7 ml   Output 1550 ml   Net -729.3 ml     Wt Readings from Last 3 Encounters:   06/01/24 129 lb 12.8 oz (58.9 kg)   05/30/24 129 lb 6.6 oz (58.7 kg)   05/18/24 127 lb (57.6 kg)       General appearance: alert, appears stated age and cooperative  Head: Normocephalic, atraumatic  Eyes: conjunctivae/corneas clear  Throat: lips, mucosa, and tongue normal; teeth and gums normal  Neck:  no JVD, supple,  Skin: No rashes or lesions  Neurologic: Grossly normal  Psychiatric: calm    Medications:  Current Facility-Administered Medications   Medication Dose Route Frequency    bumetanide (Bumex) tab 1 mg  1 mg Oral Daily    acetaminophen  (Tylenol Extra Strength) tab 500 mg  500 mg Oral Q4H PRN    butalbital-acetaminophen-caffeine (Fioricet) -40 MG per tab 1 tablet  1 tablet Oral Q4H PRN    isosorbide dinitrate (Isordil) tab 20 mg  20 mg Oral TID (Nitrates)    hydrALAZINE (Apresoline) tab 25 mg  25 mg Oral Q8H BROOKLYNN    gabapentin (Neurontin) cap 300 mg  300 mg Oral BID    polyethylene glycol (PEG 3350) (Miralax) 17 g oral packet 17 g  17 g Oral Daily    sennosides (Senokot) tab 8.6 mg  8.6 mg Oral BID    cyclobenzaprine (Flexeril) tab 5 mg  5 mg Oral TID PRN    lidocaine-menthol 4-1 % patch 1 patch  1 patch Transdermal Daily    ipratropium-albuterol (Duoneb) 0.5-2.5 (3) MG/3ML inhalation solution 3 mL  3 mL Nebulization Q6H PRN    amiodarone (Pacerone) tab 200 mg  200 mg Oral Daily    carvedilol (Coreg) tab 3.125 mg  3.125 mg Oral BID with meals    folic acid (Folvite) tab 800 mcg  800 mcg Oral Daily    mirtazapine (Remeron) tab 7.5 mg  7.5 mg Oral Nightly    pantoprazole (Protonix) DR tab 40 mg  40 mg Oral BID AC    sacubitril-valsartan (Entresto) 49-51 MG per tab 1 tablet  1 tablet Oral BID    acetaminophen (Tylenol) tab 650 mg  650 mg Oral Q4H PRN    Or    HYDROcodone-acetaminophen (Norco) 5-325 MG per tab 1 tablet  1 tablet Oral Q4H PRN    Or    HYDROcodone-acetaminophen (Norco) 5-325 MG per tab 2 tablet  2 tablet Oral Q4H PRN    ondansetron (Zofran) 4 MG/2ML injection 4 mg  4 mg Intravenous Q6H PRN              Results:     Recent Labs   Lab 05/30/24  0457 05/31/24  0539 06/01/24  0453   RBC 2.66* 2.93* 3.03*   HGB 8.2* 9.4* 9.4*   HCT 26.3* 29.2* 31.1*   MCV 98.9 99.7 102.6*   WBC 9.1 6.7 6.7   .0 314.0 361.0     Recent Labs   Lab 05/26/24  0629 05/27/24  0954 05/30/24  0457 05/31/24  0539 06/01/24  0453   *   < > 91 89 86   BUN 37*   < > 42* 45* 38*   CREATSERUM 2.18*   < > 2.07* 1.63* 1.50*   CA 8.9   < > 8.9 9.1 8.9   ALB 3.6  --  3.6  --  3.5   *   < > 136 138 137   K 4.7   < > 3.8 4.0  4.0 4.4      < > 105  107 108   CO2 19.0*   < > 21.0 23.0 22.0    < > = values in this interval not displayed.     PTT   Date Value Ref Range Status   02/09/2024 35.2 23.3 - 35.6 seconds Final     INR   Date Value Ref Range Status   02/09/2024 1.25 (H) 0.80 - 1.20 Final     Comment:     Only the INR (not the PT value) should be utilized for   the monitoring of oral anticoagulant therapy.     Recommended therapeutic ranges for anticoagulant therapy are   as follows:   2.0 - 3.0 All indications except for mechanical prosthetic   cardiac valves.     2.5 - 3.5 Mechanical prosthetic cardiac valves.       No results for input(s): \"BNP\" in the last 168 hours.  Recent Labs   Lab 05/26/24  0629 05/30/24  0457 06/01/24  0453   PHOS 4.2 3.7 3.6        Recent Labs   Lab 05/26/24  0629 05/30/24  0457 06/01/24  0453   PHOS 4.2 3.7 3.6   ALB 3.6 3.6 3.5       No results found.        Assessment and Plan:       79 year old female with past medical history of HTN, CKD 3b, HFrEF (EF 15-20%) + grade 4 DD, s/p AICD, A.fib, s/p bioprosthetic MVR, RA, and h/o GIB/AVMs, who was recently admitted from 5/16-5/18 for hypotension and george/ckd. S/p IVF and cr improved. Was discharged without diuretics.  She presented [facility] to ER with sob/ CHF exacerbation. Started on IV lasix. Cr at baseline on admission.   B/l cr 1.3-1.6 mg/dl     Sees Dr Arvizu as outpatient     Impression:     GEORGE likely over diuresed. : Cr trending down and at 1.5 mg /dl today. At baseline   CKD 3 cr at b/l 1.4-1.6 mg/dl/ monitor cr with diuresis. Bradley UA-cr bumped up with diuretics  HTN with CKD: acceptable. On coreg and entresto  Hyperkalemia s/p medical management . On entresto at home.  CHFrEF exacerbation, IV diuretics--> transition to PO diuretics at 1 mg daily bumex ..  Currently on milrinone gtt. with plan to taper  Anemia, can be contributing to her sob. : S/p PRBC.  Hemoglobin stable at 9.4 for last 24 hours      discussed with Nursing and Dr. Anthony Puckett,  MD  6/1/2024

## 2024-06-02 LAB
ALBUMIN SERPL-MCNC: 3.5 G/DL (ref 3.2–4.8)
ANION GAP SERPL CALC-SCNC: 8 MMOL/L (ref 0–18)
BUN BLD-MCNC: 37 MG/DL (ref 9–23)
BUN/CREAT SERPL: 26.4 (ref 10–20)
CALCIUM BLD-MCNC: 8.9 MG/DL (ref 8.7–10.4)
CHLORIDE SERPL-SCNC: 109 MMOL/L (ref 98–112)
CO2 SERPL-SCNC: 23 MMOL/L (ref 21–32)
CREAT BLD-MCNC: 1.4 MG/DL
DEPRECATED RDW RBC AUTO: 76.8 FL (ref 35.1–46.3)
EGFRCR SERPLBLD CKD-EPI 2021: 39 ML/MIN/1.73M2 (ref 60–?)
ERYTHROCYTE [DISTWIDTH] IN BLOOD BY AUTOMATED COUNT: 20.9 % (ref 11–15)
GLUCOSE BLD-MCNC: 77 MG/DL (ref 70–99)
HCT VFR BLD AUTO: 32.9 %
HGB BLD-MCNC: 10 G/DL
MCH RBC QN AUTO: 31.1 PG (ref 26–34)
MCHC RBC AUTO-ENTMCNC: 30.4 G/DL (ref 31–37)
MCV RBC AUTO: 102.2 FL
OSMOLALITY SERPL CALC.SUM OF ELEC: 297 MOSM/KG (ref 275–295)
PHOSPHATE SERPL-MCNC: 3.9 MG/DL (ref 2.4–5.1)
PLATELET # BLD AUTO: 368 10(3)UL (ref 150–450)
POTASSIUM SERPL-SCNC: 3.9 MMOL/L (ref 3.5–5.1)
RBC # BLD AUTO: 3.22 X10(6)UL
SODIUM SERPL-SCNC: 140 MMOL/L (ref 136–145)
WBC # BLD AUTO: 6 X10(3) UL (ref 4–11)

## 2024-06-02 PROCEDURE — 99233 SBSQ HOSP IP/OBS HIGH 50: CPT | Performed by: STUDENT IN AN ORGANIZED HEALTH CARE EDUCATION/TRAINING PROGRAM

## 2024-06-02 PROCEDURE — 99233 SBSQ HOSP IP/OBS HIGH 50: CPT | Performed by: INTERNAL MEDICINE

## 2024-06-02 NOTE — PLAN OF CARE
A&Ox4. Pt ambulates with walker and one person assist, milrinone infusion discontinued and started Bumex today, voiding via purewick. On room air with tele monitoring in place, PRNs  provided as needed for pain. Cervical collar in place. Frequent rounding by nursing staff. Safety precautions maintained/call light within reach.   Problem: Patient Centered Care  Goal: Patient preferences are identified and integrated in the patient's plan of care  Description: Interventions:  - What would you like us to know as we care for you? From home with   - Provide timely, complete, and accurate information to patient/family  - Incorporate patient and family knowledge, values, beliefs, and cultural backgrounds into the planning and delivery of care  - Encourage patient/family to participate in care and decision-making at the level they choose  - Honor patient and family perspectives and choices  Outcome: Progressing     Problem: Patient/Family Goals  Goal: Patient/Family Long Term Goal  Description: Patient's Long Term Goal: discharge    Interventions:  - Monitor vital signs, labs, test results  - Oxygen and respiratory therapy as needed  - Pain management  - Follow MD orders  - Administer medications per MD order  - Diagnostics per order  - Update /  inform patient on plan of care  - Discharge planning  - See additional Care Plan goals for specific interventions  Outcome: Progressing  Goal: Patient/Family Short Term Goal  Description: Patient's Short Term Goal: correct electrolytes    Interventions:   - Nephro recommendations  - Follow MD orders  - Take meds as necessary for electrolyte replacement  - See additional Care Plan goals for specific interventions  Outcome: Progressing     Problem: RESPIRATORY - ADULT  Goal: Achieves optimal ventilation and oxygenation  Description: INTERVENTIONS:  - Assess for changes in respiratory status  - Assess for changes in mentation and behavior  - Position to facilitate oxygenation  and minimize respiratory effort  - Oxygen supplementation based on oxygen saturation or ABGs  - Provide Smoking Cessation handout, if applicable  - Encourage broncho-pulmonary hygiene including cough, deep breathe, Incentive Spirometry  - Assess the need for suctioning and perform as needed  - Assess and instruct to report SOB or any respiratory difficulty  - Respiratory Therapy support as indicated  - Manage/alleviate anxiety  - Monitor for signs/symptoms of CO2 retention  Outcome: Progressing     Problem: CARDIOVASCULAR - ADULT  Goal: Maintains optimal cardiac output and hemodynamic stability  Description: INTERVENTIONS:  - Monitor vital signs, rhythm, and trends  - Monitor for bleeding, hypotension and signs of decreased cardiac output  - Evaluate effectiveness of vasoactive medications to optimize hemodynamic stability  - Monitor arterial and/or venous puncture sites for bleeding and/or hematoma  - Assess quality of pulses, skin color and temperature  - Assess for signs of decreased coronary artery perfusion - ex. Angina  - Evaluate fluid balance, assess for edema, trend weights  Outcome: Progressing  Goal: Absence of cardiac arrhythmias or at baseline  Description: INTERVENTIONS:  - Continuous cardiac monitoring, monitor vital signs, obtain 12 lead EKG if indicated  - Evaluate effectiveness of antiarrhythmic and heart rate control medications as ordered  - Initiate emergency measures for life threatening arrhythmias  - Monitor electrolytes and administer replacement therapy as ordered  Outcome: Progressing     Problem: GASTROINTESTINAL - ADULT  Goal: Maintains or returns to baseline bowel function  Description: INTERVENTIONS:  - Assess bowel function  - Maintain adequate hydration with IV or PO as ordered and tolerated  - Evaluate effectiveness of GI medications  - Encourage mobilization and activity  - Obtain nutritional consult as needed  - Establish a toileting routine/schedule  - Consider collaborating with  pharmacy to review patient's medication profile  Outcome: Progressing     Problem: GENITOURINARY - ADULT  Goal: Absence of urinary retention  Description: INTERVENTIONS:  - Assess patient’s ability to void and empty bladder  - Monitor intake/output and perform bladder scan as needed  - Follow urinary retention protocol/standard of care  - Consider collaborating with pharmacy to review patient's medication profile  - Implement strategies to promote bladder emptying  Outcome: Progressing     Problem: PAIN - ADULT  Goal: Verbalizes/displays adequate comfort level or patient's stated pain goal  Description: INTERVENTIONS:  - Encourage pt to monitor pain and request assistance  - Assess pain using appropriate pain scale  - Administer analgesics based on type and severity of pain and evaluate response  - Implement non-pharmacological measures as appropriate and evaluate response  - Consider cultural and social influences on pain and pain management  - Manage/alleviate anxiety  - Utilize distraction and/or relaxation techniques  - Monitor for opioid side effects  - Notify MD/LIP if interventions unsuccessful or patient reports new pain  - Anticipate increased pain with activity and pre-medicate as appropriate  Outcome: Progressing

## 2024-06-02 NOTE — PLAN OF CARE
Patient alert and oriented. Tele. Call light within reach. Frequent rounding by staff.  Problem: Patient Centered Care  Goal: Patient preferences are identified and integrated in the patient's plan of care  Description: Interventions:  - What would you like us to know as we care for you? From home with   - Provide timely, complete, and accurate information to patient/family  - Incorporate patient and family knowledge, values, beliefs, and cultural backgrounds into the planning and delivery of care  - Encourage patient/family to participate in care and decision-making at the level they choose  - Honor patient and family perspectives and choices  Outcome: Progressing     Problem: Patient/Family Goals  Goal: Patient/Family Long Term Goal  Description: Patient's Long Term Goal: discharge    Interventions:  - Monitor vital signs, labs, test results  - Oxygen and respiratory therapy as needed  - Pain management  - Follow MD orders  - Administer medications per MD order  - Diagnostics per order  - Update /  inform patient on plan of care  - Discharge planning  - See additional Care Plan goals for specific interventions  Outcome: Progressing  Goal: Patient/Family Short Term Goal  Description: Patient's Short Term Goal: correct electrolytes    Interventions:   - Nephro recommendations  - Follow MD orders  - Take meds as necessary for electrolyte replacement  - See additional Care Plan goals for specific interventions  Outcome: Progressing     Problem: RESPIRATORY - ADULT  Goal: Achieves optimal ventilation and oxygenation  Description: INTERVENTIONS:  - Assess for changes in respiratory status  - Assess for changes in mentation and behavior  - Position to facilitate oxygenation and minimize respiratory effort  - Oxygen supplementation based on oxygen saturation or ABGs  - Provide Smoking Cessation handout, if applicable  - Encourage broncho-pulmonary hygiene including cough, deep breathe, Incentive Spirometry  -  Assess the need for suctioning and perform as needed  - Assess and instruct to report SOB or any respiratory difficulty  - Respiratory Therapy support as indicated  - Manage/alleviate anxiety  - Monitor for signs/symptoms of CO2 retention  Outcome: Progressing     Problem: CARDIOVASCULAR - ADULT  Goal: Maintains optimal cardiac output and hemodynamic stability  Description: INTERVENTIONS:  - Monitor vital signs, rhythm, and trends  - Monitor for bleeding, hypotension and signs of decreased cardiac output  - Evaluate effectiveness of vasoactive medications to optimize hemodynamic stability  - Monitor arterial and/or venous puncture sites for bleeding and/or hematoma  - Assess quality of pulses, skin color and temperature  - Assess for signs of decreased coronary artery perfusion - ex. Angina  - Evaluate fluid balance, assess for edema, trend weights  Outcome: Progressing  Goal: Absence of cardiac arrhythmias or at baseline  Description: INTERVENTIONS:  - Continuous cardiac monitoring, monitor vital signs, obtain 12 lead EKG if indicated  - Evaluate effectiveness of antiarrhythmic and heart rate control medications as ordered  - Initiate emergency measures for life threatening arrhythmias  - Monitor electrolytes and administer replacement therapy as ordered  Outcome: Progressing     Problem: GASTROINTESTINAL - ADULT  Goal: Maintains or returns to baseline bowel function  Description: INTERVENTIONS:  - Assess bowel function  - Maintain adequate hydration with IV or PO as ordered and tolerated  - Evaluate effectiveness of GI medications  - Encourage mobilization and activity  - Obtain nutritional consult as needed  - Establish a toileting routine/schedule  - Consider collaborating with pharmacy to review patient's medication profile  Outcome: Progressing     Problem: GENITOURINARY - ADULT  Goal: Absence of urinary retention  Description: INTERVENTIONS:  - Assess patient’s ability to void and empty bladder  - Monitor  intake/output and perform bladder scan as needed  - Follow urinary retention protocol/standard of care  - Consider collaborating with pharmacy to review patient's medication profile  - Implement strategies to promote bladder emptying  Outcome: Progressing     Problem: PAIN - ADULT  Goal: Verbalizes/displays adequate comfort level or patient's stated pain goal  Description: INTERVENTIONS:  - Encourage pt to monitor pain and request assistance  - Assess pain using appropriate pain scale  - Administer analgesics based on type and severity of pain and evaluate response  - Implement non-pharmacological measures as appropriate and evaluate response  - Consider cultural and social influences on pain and pain management  - Manage/alleviate anxiety  - Utilize distraction and/or relaxation techniques  - Monitor for opioid side effects  - Notify MD/LIP if interventions unsuccessful or patient reports new pain  - Anticipate increased pain with activity and pre-medicate as appropriate  Outcome: Progressing

## 2024-06-02 NOTE — PROGRESS NOTES
Progress Note     Margaret Silverio Patient Status:  Inpatient    3/14/1945 MRN [id number]   Location [facility]5W Attending Allyssa Longo MD   Hosp Day # 9 PCP Johnathon Rodriguez,        Subjective:   S: Patient reports feeling \"fine\", had no complaints.  No lower extremity swelling.    Review of Systems:   10 point ROS completed and was negative, except for pertinent positive and negatives stated in subjective.    Objective:   Vital signs:  Temp:  [97.3 °F (36.3 °C)-98.2 °F (36.8 °C)] 98.2 °F (36.8 °C)  Pulse:  [60-65] 65  Resp:  [16-18] 18  BP: (108-149)/(48-75) 130/64  SpO2:  [97 %-100 %] 99 %    Wt Readings from Last 6 Encounters:   24 120 lb 6.4 oz (54.6 kg)   24 129 lb 6.6 oz (58.7 kg)   24 127 lb (57.6 kg)   24 126 lb (57.2 kg)   24 126 lb (57.2 kg)   24 126 lb (57.2 kg)         Physical Exam:    General: Thin, elderly female, chronically ill-appearing  Respiratory: Clear to auscultation bilaterally.   Cardiovascular: RRR   Abdomen: Soft, nontender  Neurologic: No focal neurological deficits.   Musculoskeletal: Moves all extremities.  Extremities: No edema.    Results:   Diagnostic Data:      Labs:    Labs Last 24 Hours:   BMP     CBC    Other     Na 140 Cl 109 BUN 37 Glu 77   Hb 10.0   PTT - Procal -   K 3.9 CO2 23.0 Cr 1.40   WBC 6.0 >< .0  INR - CRP -   Renal Lytes Endo    Hct 32.9   Trop - D dim -   eGFR - Ca 8.9 POC Gluc  -    LFT   pBNP - Lactic -   eGFR AA - PO4 3.9 A1c -   AST - APk - Prot -  LDL -     Mg - TSH -   ALT - T desirae - Alb 3.5        COVID-19 Lab Results    COVID-19  Lab Results   Component Value Date    COVID19 Not Detected 2024    COVID19 Not Detected 2024    COVID19 Not Detected 2023       Pro-Calcitonin  No results for input(s): \"PCT\" in the last 168 hours.    Cardiac  No results for input(s): \"TROP\", \"PBNP\" in the last 168 hours.    Creatinine Kinase  No results for input(s): \"CK\" in the last 168  hours.    Inflammatory Markers  Recent Labs   Lab 05/28/24  0548   FRANNY 667.2*       Imaging: Imaging data reviewed in Epic.    Medications:    bumetanide  1 mg Oral Daily    isosorbide dinitrate  20 mg Oral TID (Nitrates)    hydrALAZINE  25 mg Oral Q8H BROOKLYNN    gabapentin  300 mg Oral BID    polyethylene glycol (PEG 3350)  17 g Oral Daily    sennosides  8.6 mg Oral BID    lidocaine-menthol  1 patch Transdermal Daily    amiodarone  200 mg Oral Daily    carvedilol  3.125 mg Oral BID with meals    folic acid  800 mcg Oral Daily    mirtazapine  7.5 mg Oral Nightly    pantoprazole  40 mg Oral BID AC    sacubitril-valsartan  1 tablet Oral BID       Assessment & Plan:   ASSESSMENT / PLAN:     Acute hypoxic respiratory failure - resolved.   Secondary to Pulmonary edema from Acute CHF and anemia.   Initially on NIPPV now on RA.   CXR pulmonary edema. BNP > 5000  Was diuresed with lasix however developed GEORGE. Better   Pulmonary and cards on consult.   Dana Blackman   Acute on chronic HFrEF, NICM   ECHO from 2/24 LVEF 15-20%   S/p AICD   GDMT: coreg, resume entresto, hydralazine.   S/p Lasix 40mg IV x 1  Now on Bumex 1mg PO daily  Weaned off milrinone gtt   GEORGE on CKD III   Baseline creat 1.4-1.6   Improved.   Likely secondary to overdiuresis vs cardiorenal syndrome from poor EF.    Renal consulted, advise outpatient follow up in 2-3 weeks with Dr. Arvizu  Labs improving.    Acute anemia  Baseline Hb 10-12 per EMR back in Feb  Iron panel ok.   Had Colonoscopy Jan 2024 found to have AVM's   No signs of blood loss at this time.   GI on consult  No overt bleeding. H/H improved   S/p 1 unit PRBC   5.     Acute on Chronic neck pain with radiculopathy            Multilevel spondylosis and instability C3-4, C4-5   Gabapentin 300mg BID, norco, flexeril. Lidoderm patch   Physiatry consult possible outpt [facility]   MRI C spine planned for 6/3   Neurosurgery on consult  Hard collar for instability seen on XR C spine C3-4        Coordinated care with providers and counseling re: treatment plan and workup  MDM: High, I personally reviewed the available laboratories, imaging including XR spine. I discussed the case with nephrology.  I monitored  medications including bumex. Medical decision making high, risk is high  >55min spent, >50% spent counseling and coordinating care in the form of educating pt/family and d/w consultants and staff. Most of the time spent discussing the above plan.        MDM: High complexity     Allyssa Longo MD    Supplementary Documentation:

## 2024-06-02 NOTE — PROGRESS NOTES
[facility]  part of [facility]    Progress Note      Subjective:     Patient having neck pain.  Currently at C-spine collar.  Milrinone gtt. discontinued    Review of Systems:   Constitutional: negative for fatigue, fevers and weight loss  Eyes: negative for irritation, redness and visual disturbance  Ears, nose, mouth, throat, and face: negative for hearing loss and sore throat  Respiratory: negative for cough, hemoptysis and wheezing  Cardiovascular: negative for chest pain, exertional dyspnea, lower extremity edema and palpitations  Gastrointestinal: negative for abdominal pain, diarrhea and nausea  Genitourinary:negative for dysuria, frequency and hematuria  Hematologic/lymphatic: negative for bleeding and easy bruising  Musculoskeletal:negative for back pain, bone pain and muscle weakness  Neurological: negative for gait problems, memory problems and seizures  Behavioral/Psych: negative for anxiety and depression      Objective:   Temp:  [97.3 °F (36.3 °C)-98.2 °F (36.8 °C)] 98.2 °F (36.8 °C)  Pulse:  [60-65] 65  Resp:  [16-18] 18  BP: (108-149)/(48-75) 131/58  SpO2:  [97 %-100 %] 99 %  SpO2: 99 %     Intake/Output Summary (Last 24 hours) at 6/2/2024 1238  Last data filed at 6/2/2024 1148  Gross per 24 hour   Intake 420.7 ml   Output 2350 ml   Net -1929.3 ml     Wt Readings from Last 3 Encounters:   06/02/24 120 lb 6.4 oz (54.6 kg)   05/30/24 129 lb 6.6 oz (58.7 kg)   05/18/24 127 lb (57.6 kg)       General appearance: alert, appears stated age and cooperative  Head: Normocephalic, atraumatic  Eyes: conjunctivae/corneas clear  Throat: lips, mucosa, and tongue normal; teeth and gums normal  Neck:  no JVD, supple,  Skin: No rashes or lesions  Neurologic: Grossly normal  Psychiatric: calm    Medications:  Current Facility-Administered Medications   Medication Dose Route Frequency    bumetanide (Bumex) tab 1 mg  1 mg Oral Daily    acetaminophen (Tylenol Extra Strength) tab 500 mg  500 mg Oral  Q4H PRN    butalbital-acetaminophen-caffeine (Fioricet) -40 MG per tab 1 tablet  1 tablet Oral Q4H PRN    isosorbide dinitrate (Isordil) tab 20 mg  20 mg Oral TID (Nitrates)    hydrALAZINE (Apresoline) tab 25 mg  25 mg Oral Q8H BROOKLYNN    gabapentin (Neurontin) cap 300 mg  300 mg Oral BID    polyethylene glycol (PEG 3350) (Miralax) 17 g oral packet 17 g  17 g Oral Daily    sennosides (Senokot) tab 8.6 mg  8.6 mg Oral BID    cyclobenzaprine (Flexeril) tab 5 mg  5 mg Oral TID PRN    lidocaine-menthol 4-1 % patch 1 patch  1 patch Transdermal Daily    ipratropium-albuterol (Duoneb) 0.5-2.5 (3) MG/3ML inhalation solution 3 mL  3 mL Nebulization Q6H PRN    amiodarone (Pacerone) tab 200 mg  200 mg Oral Daily    carvedilol (Coreg) tab 3.125 mg  3.125 mg Oral BID with meals    folic acid (Folvite) tab 800 mcg  800 mcg Oral Daily    mirtazapine (Remeron) tab 7.5 mg  7.5 mg Oral Nightly    pantoprazole (Protonix) DR tab 40 mg  40 mg Oral BID AC    sacubitril-valsartan (Entresto) 49-51 MG per tab 1 tablet  1 tablet Oral BID    acetaminophen (Tylenol) tab 650 mg  650 mg Oral Q4H PRN    Or    HYDROcodone-acetaminophen (Norco) 5-325 MG per tab 1 tablet  1 tablet Oral Q4H PRN    Or    HYDROcodone-acetaminophen (Norco) 5-325 MG per tab 2 tablet  2 tablet Oral Q4H PRN    ondansetron (Zofran) 4 MG/2ML injection 4 mg  4 mg Intravenous Q6H PRN              Results:     Recent Labs   Lab 05/31/24  0539 06/01/24  0453 06/02/24  0457   RBC 2.93* 3.03* 3.22*   HGB 9.4* 9.4* 10.0*   HCT 29.2* 31.1* 32.9*   MCV 99.7 102.6* 102.2*   WBC 6.7 6.7 6.0   .0 361.0 368.0     Recent Labs   Lab 05/30/24  0457 05/31/24  0539 06/01/24  0453 06/02/24  0457   GLU 91 89 86 77   BUN 42* 45* 38* 37*   CREATSERUM 2.07* 1.63* 1.50* 1.40*   CA 8.9 9.1 8.9 8.9   ALB 3.6  --  3.5 3.5    138 137 140   K 3.8 4.0  4.0 4.4 3.9    107 108 109   CO2 21.0 23.0 22.0 23.0     PTT   Date Value Ref Range Status   02/09/2024 35.2 23.3 - 35.6 seconds  Final     INR   Date Value Ref Range Status   02/09/2024 1.25 (H) 0.80 - 1.20 Final     Comment:     Only the INR (not the PT value) should be utilized for   the monitoring of oral anticoagulant therapy.     Recommended therapeutic ranges for anticoagulant therapy are   as follows:   2.0 - 3.0 All indications except for mechanical prosthetic   cardiac valves.     2.5 - 3.5 Mechanical prosthetic cardiac valves.       No results for input(s): \"BNP\" in the last 168 hours.  Recent Labs   Lab 05/30/24 0457 06/01/24 0453 06/02/24 0457   PHOS 3.7 3.6 3.9        Recent Labs   Lab 05/30/24 0457 06/01/24 0453 06/02/24 0457   PHOS 3.7 3.6 3.9   ALB 3.6 3.5 3.5       No results found.        Assessment and Plan:       79 year old female with past medical history of HTN, CKD 3b, HFrEF (EF 15-20%) + grade 4 DD, s/p AICD, A.fib, s/p bioprosthetic MVR, RA, and h/o GIB/AVMs, who was recently admitted from 5/16-5/18 for hypotension and george/ckd. S/p IVF and cr improved. Was discharged without diuretics.  She presented [facility] to ER with sob/ CHF exacerbation. Started on IV lasix. Cr at baseline on admission.   B/l cr 1.3-1.6 mg/dl     Sees Dr Arvizu as outpatient     Impression:     GEORGE likely over diuresed. : Cr trending down and at 1.4 mg /dl today. At baseline   CKD 3 cr at b/l 1.4-1.6 mg/dl/ monitor cr with diuresis. [place] [facility]-cr bumped up with diuretics  HTN with CKD: acceptable. On coreg and entresto  Hyperkalemia s/p medical management .  Entresto resumed and potassium within normal limits  CHFrEF exacerbation, IV diuretics--> transition to PO diuretics at 1 mg daily bumex ..  Milrinone gtt. discontinued  Anemia, can be contributing to her sob. : S/p PRBC.  Hemoglobin stable at 9.4 for last 24 hours      discussed with Nursing and Dr. Longo    Will sign off please call if any question.      Call to make appointment with Dr. Arvizu in 2  -3 weeks        Will Puckett MD  6/2/2024

## 2024-06-02 NOTE — PLAN OF CARE
Patient alert and oriented x 4. Room air. Ambulates with walker x1. Q4 neuro checks. PRN Norco given for pain management. Plan for MRI tomorrow. Patient updated on plan of care and verbalized understanding. Frequent nursing rounding performed. Call light within reach.     Problem: Patient Centered Care  Goal: Patient preferences are identified and integrated in the patient's plan of care  Description: Interventions:  - What would you like us to know as we care for you? From home with   - Provide timely, complete, and accurate information to patient/family  - Incorporate patient and family knowledge, values, beliefs, and cultural backgrounds into the planning and delivery of care  - Encourage patient/family to participate in care and decision-making at the level they choose  - Honor patient and family perspectives and choices  Outcome: Progressing     Problem: Patient/Family Goals  Goal: Patient/Family Long Term Goal  Description: Patient's Long Term Goal: discharge    Interventions:  - Monitor vital signs, labs, test results  - Oxygen and respiratory therapy as needed  - Pain management  - Follow MD orders  - Administer medications per MD order  - Diagnostics per order  - Update /  inform patient on plan of care  - Discharge planning  - See additional Care Plan goals for specific interventions  Outcome: Progressing  Goal: Patient/Family Short Term Goal  Description: Patient's Short Term Goal: correct electrolytes    Interventions:   - Nephro recommendations  - Follow MD orders  - Take meds as necessary for electrolyte replacement  - See additional Care Plan goals for specific interventions  Outcome: Progressing     Problem: RESPIRATORY - ADULT  Goal: Achieves optimal ventilation and oxygenation  Description: INTERVENTIONS:  - Assess for changes in respiratory status  - Assess for changes in mentation and behavior  - Position to facilitate oxygenation and minimize respiratory effort  - Oxygen supplementation  based on oxygen saturation or ABGs  - Provide Smoking Cessation handout, if applicable  - Encourage broncho-pulmonary hygiene including cough, deep breathe, Incentive Spirometry  - Assess the need for suctioning and perform as needed  - Assess and instruct to report SOB or any respiratory difficulty  - Respiratory Therapy support as indicated  - Manage/alleviate anxiety  - Monitor for signs/symptoms of CO2 retention  Outcome: Progressing     Problem: CARDIOVASCULAR - ADULT  Goal: Maintains optimal cardiac output and hemodynamic stability  Description: INTERVENTIONS:  - Monitor vital signs, rhythm, and trends  - Monitor for bleeding, hypotension and signs of decreased cardiac output  - Evaluate effectiveness of vasoactive medications to optimize hemodynamic stability  - Monitor arterial and/or venous puncture sites for bleeding and/or hematoma  - Assess quality of pulses, skin color and temperature  - Assess for signs of decreased coronary artery perfusion - ex. Angina  - Evaluate fluid balance, assess for edema, trend weights  Outcome: Progressing  Goal: Absence of cardiac arrhythmias or at baseline  Description: INTERVENTIONS:  - Continuous cardiac monitoring, monitor vital signs, obtain 12 lead EKG if indicated  - Evaluate effectiveness of antiarrhythmic and heart rate control medications as ordered  - Initiate emergency measures for life threatening arrhythmias  - Monitor electrolytes and administer replacement therapy as ordered  Outcome: Progressing     Problem: GASTROINTESTINAL - ADULT  Goal: Maintains or returns to baseline bowel function  Description: INTERVENTIONS:  - Assess bowel function  - Maintain adequate hydration with IV or PO as ordered and tolerated  - Evaluate effectiveness of GI medications  - Encourage mobilization and activity  - Obtain nutritional consult as needed  - Establish a toileting routine/schedule  - Consider collaborating with pharmacy to review patient's medication profile  Outcome:  Progressing     Problem: GENITOURINARY - ADULT  Goal: Absence of urinary retention  Description: INTERVENTIONS:  - Assess patient’s ability to void and empty bladder  - Monitor intake/output and perform bladder scan as needed  - Follow urinary retention protocol/standard of care  - Consider collaborating with pharmacy to review patient's medication profile  - Implement strategies to promote bladder emptying  Outcome: Progressing     Problem: PAIN - ADULT  Goal: Verbalizes/displays adequate comfort level or patient's stated pain goal  Description: INTERVENTIONS:  - Encourage pt to monitor pain and request assistance  - Assess pain using appropriate pain scale  - Administer analgesics based on type and severity of pain and evaluate response  - Implement non-pharmacological measures as appropriate and evaluate response  - Consider cultural and social influences on pain and pain management  - Manage/alleviate anxiety  - Utilize distraction and/or relaxation techniques  - Monitor for opioid side effects  - Notify MD/LIP if interventions unsuccessful or patient reports new pain  - Anticipate increased pain with activity and pre-medicate as appropriate  Outcome: Progressing

## 2024-06-02 NOTE — PROGRESS NOTES
Progress Note  Margaret Silverio Patient Status:  Inpatient    3/14/1945 MRN [id number]   Location [facility]5W Attending Regan Cruz MD   Hosp Day # 9 PCP Johnathon Rodriguez DO     Subjective:  Sleeping, denies cardiac  complaints, states headache is better today  Off milrinone gtt    Objective:  /62 (BP Location: Right arm)   Pulse 60   Temp 97.6 °F (36.4 °C) (Axillary)   Resp 16   Ht 5' 5\" (1.651 m)   Wt 120 lb 6.4 oz (54.6 kg)   SpO2 99%   BMI 20.04 kg/m²     Telemetry: AV paced 60    Intake/Output:    Intake/Output Summary (Last 24 hours) at 2024 0716  Last data filed at 2024 0520  Gross per 24 hour   Intake 620.7 ml   Output 2300 ml   Net -1679.3 ml     Last 3 Weights   24 0639 120 lb 6.4 oz (54.6 kg)   24 0855 129 lb 12.8 oz (58.9 kg)   24 0548 138 lb 11.2 oz (62.9 kg)   24 0554 134 lb 1.6 oz (60.8 kg)   24 0440 129 lb 4.8 oz (58.7 kg)   24 0631 128 lb 8 oz (58.3 kg)   24 0534 127 lb (57.6 kg)   24 0642 130 lb 8 oz (59.2 kg)   24 0500 124 lb 12.8 oz (56.6 kg)   24 0504 123 lb 10.9 oz (56.1 kg)   24 2241 123 lb 14.4 oz (56.2 kg)   24 1724 125 lb 10.6 oz (57 kg)   24 1704 129 lb 6.6 oz (58.7 kg)   24 0633 127 lb (57.6 kg)   24 0510 119 lb 8 oz (54.2 kg)   24 0500 129 lb 12.8 oz (58.9 kg)     Labs:  Recent Labs   Lab 24   GLU 89 86 77   BUN 45* 38* 37*   CREATSERUM 1.63* 1.50* 1.40*   EGFRCR 32* 35* 39*   CA 9.1 8.9 8.9    137 140   K 4.0  4.0 4.4 3.9    108 109   CO2 23.0 22.0 23.0     Recent Labs   Lab 24   RBC 2.93* 3.03* 3.22*   HGB 9.4* 9.4* 10.0*   HCT 29.2* 31.1* 32.9*   MCV 99.7 102.6* 102.2*   MCH 32.1 31.0 31.1   MCHC 32.2 30.2* 30.4*   RDW 21.9* 21.2* 20.9*   WBC 6.7 6.7 6.0   .0 361.0 368.0     No results for input(s): \"TROP\", \"TROPHS\", \"CK\" in the last 168  hours.  Lab Results   Component Value Date/Time    HDL 54 02/10/2024 12:38 PM    LDL 63 02/10/2024 12:38 PM    TRIG 91 02/10/2024 12:38 PM     No results found for: \"DDIMER\"  Lab Results   Component Value Date    TSH 10.681 (H) 02/03/2024     Review of Systems:   Constitutional: No fevers, chills, fatigue or night sweats.  ENT: No mouth pain, neck pain, running nose, headaches or swollen glands.  Skin: No rashes, pruritus or skin changes,  Respiratory: Denies cough, wheezing or shortness of breath.  CV: Denies chest pain, palpitations, orthopnea, PND or dizziness.  Musculoskeletal: No joint pain, stiffness or swelling.  GI: No nausea, vomiting or diarrhea. No blood in stools.  Neurologic: No seizures, tremors, weakness or numbness.     Physical Exam:  General: Alert, cooperative, no distress, appears stated age.  Neck: Supple, symmetrical, trachea midline, no adenopathy, thyroid: no enlargment/tenderness/nodules, no carotid bruit and no JVD.  Lungs: diminished bilaterally.  Chest wall: No tenderness or deformity.  Heart: Regular rate and rhythm, S1, S2 normal, +2/6 murmur, no click, rub or gallop.  Abdomen: Soft, non-tender. Bowel sounds normal. No masses,  No organomegaly.  Extremities: Extremities normal, atraumatic, no cyanosis or edema.  Pulses: 2+ and symmetric all extremities.  Neurologic: Grossly intact.    Medications:   bumetanide  1 mg Oral Daily    isosorbide dinitrate  20 mg Oral TID (Nitrates)    hydrALAZINE  25 mg Oral Q8H BROOKLYNN    gabapentin  300 mg Oral BID    polyethylene glycol (PEG 3350)  17 g Oral Daily    sennosides  8.6 mg Oral BID    lidocaine-menthol  1 patch Transdermal Daily    amiodarone  200 mg Oral Daily    carvedilol  3.125 mg Oral BID with meals    folic acid  800 mcg Oral Daily    mirtazapine  7.5 mg Oral Nightly    pantoprazole  40 mg Oral BID AC    sacubitril-valsartan  1 tablet Oral BID     Assessment:    Acute on chronic HFrEF  Admitted with increasing shortness of breath  -LVEF  15-20% on echo in February  -diuresing with IV bumex burst dosing, good UOP response yesterday  -weaned off milrinone gtt yesterday  -I/Os net post 700mL, weight down 5# from admission, 9# overnight on standing scale-states dry weight is 150, admission weight 123#, does not appear grossly overloaded on exam  -BNP >5000 on admission  -GDMT: BB, hydralazine, isordil, ARNI  -coreg and entresto resumed, BP stable  -heart failure clinic follow up after discharge  -[place] DC-ICD (9/2023) due to persistent low EF <35% despite GDMT    Severe Tricuspid Regurgitation  Diuresing with IV lasix with good response, Cr stable  -PO bumex daily  -monitor strict I/Os, daily weight, daily BMP    Severe mitral regurgitation   S/p MVR    HTN  BP low 100s previously, held entresto, coreg,now resumed  -currently normotensive    Hx PAF/Atrial flutter  Currently AV Paced on tele  -coreg, amiodarone  -s/p Watchman    GEORGE on CKD  Baseline cr 1.4-1.6, up to 2.4 this admission, now back to baseline today at 1.4  -milrinone gtt stopped yesterday  -nephrology following    Headache/cervical pain  Neurosurgery following  -C-spine MRI to be done on Monday when ICD rep available for reprogramming, MRI form completed by device clinic  -C-collar in place    Acute on chronic Anemia  Hgb stable ~9-10    Seizure disorder    Rheumatoid Arthritis    Hx GIB  S/p watchman       Plan:  -Continue coreg, entresto, daily bumex  -monitor strict I/Os, daily weights, daily BMP  -Continue isordil, hydralazine-hold for SBP <110, would prefer holding these over coreg and entresto if BP a concern  -C-spine to be done on Monday when rep available for device settings    Plan of care discussed with patient, RN.    KODI Gayle  06/02/24  7:16 AM  [phone] [place]  [phone] [place]      I saw and examined the patient and agree with attached findings.  Overall doing well without cardiac complaints.  Appears clinically euvolemic.  Continue oral Bumex 1 mg  once daily.  Plan for MRI tomorrow.    Ayush Spangler MD  [place] [facility] [place]

## 2024-06-03 ENCOUNTER — APPOINTMENT (OUTPATIENT)
Dept: MRI IMAGING | Facility: HOSPITAL | Age: 79
End: 2024-06-03
Attending: HOSPITALIST
Payer: MEDICARE

## 2024-06-03 VITALS
HEART RATE: 62 BPM | HEIGHT: 65 IN | OXYGEN SATURATION: 98 % | BODY MASS INDEX: 20.1 KG/M2 | RESPIRATION RATE: 18 BRPM | WEIGHT: 120.63 LBS | DIASTOLIC BLOOD PRESSURE: 53 MMHG | TEMPERATURE: 98 F | SYSTOLIC BLOOD PRESSURE: 125 MMHG

## 2024-06-03 DIAGNOSIS — M05.79 RHEUMATOID ARTHRITIS INVOLVING MULTIPLE SITES WITH POSITIVE RHEUMATOID FACTOR (HCC): ICD-10-CM

## 2024-06-03 PROCEDURE — 4B02XTZ MEASUREMENT OF CARDIAC DEFIBRILLATOR, EXTERNAL APPROACH: ICD-10-PCS | Performed by: INTERNAL MEDICINE

## 2024-06-03 PROCEDURE — 72141 MRI NECK SPINE W/O DYE: CPT | Performed by: HOSPITALIST

## 2024-06-03 PROCEDURE — 99239 HOSP IP/OBS DSCHRG MGMT >30: CPT | Performed by: HOSPITALIST

## 2024-06-03 RX ORDER — GABAPENTIN 300 MG/1
300 CAPSULE ORAL 2 TIMES DAILY
Qty: 60 CAPSULE | Refills: 0 | Status: SHIPPED | OUTPATIENT
Start: 2024-06-03 | End: 2024-07-03

## 2024-06-03 RX ORDER — ISOSORBIDE DINITRATE 20 MG/1
20 TABLET ORAL
Qty: 90 TABLET | Refills: 0 | Status: SHIPPED | OUTPATIENT
Start: 2024-06-03 | End: 2024-07-03

## 2024-06-03 NOTE — PLAN OF CARE
Pt okay to discharge per MD. Frequent rounding by staff, bed is in locked and low position, bed alarm in place. Call light within reach. Report given to JEFFERY Viera.  Problem: Patient Centered Care  Goal: Patient preferences are identified and integrated in the patient's plan of care  Description: Interventions:  - What would you like us to know as we care for you? From home with   - Provide timely, complete, and accurate information to patient/family  - Incorporate patient and family knowledge, values, beliefs, and cultural backgrounds into the planning and delivery of care  - Encourage patient/family to participate in care and decision-making at the level they choose  - Honor patient and family perspectives and choices  Outcome: Progressing     Problem: Patient/Family Goals  Goal: Patient/Family Long Term Goal  Description: Patient's Long Term Goal: discharge    Interventions:  - Monitor vital signs, labs, test results  - Oxygen and respiratory therapy as needed  - Pain management  - Follow MD orders  - Administer medications per MD order  - Diagnostics per order  - Update /  inform patient on plan of care  - Discharge planning  - See additional Care Plan goals for specific interventions  Outcome: Progressing  Goal: Patient/Family Short Term Goal  Description: Patient's Short Term Goal: correct electrolytes    Interventions:   - Nephro recommendations  - Follow MD orders  - Take meds as necessary for electrolyte replacement  - See additional Care Plan goals for specific interventions  Outcome: Progressing     Problem: RESPIRATORY - ADULT  Goal: Achieves optimal ventilation and oxygenation  Description: INTERVENTIONS:  - Assess for changes in respiratory status  - Assess for changes in mentation and behavior  - Position to facilitate oxygenation and minimize respiratory effort  - Oxygen supplementation based on oxygen saturation or ABGs  - Provide Smoking Cessation handout, if applicable  - Encourage  broncho-pulmonary hygiene including cough, deep breathe, Incentive Spirometry  - Assess the need for suctioning and perform as needed  - Assess and instruct to report SOB or any respiratory difficulty  - Respiratory Therapy support as indicated  - Manage/alleviate anxiety  - Monitor for signs/symptoms of CO2 retention  Outcome: Progressing     Problem: CARDIOVASCULAR - ADULT  Goal: Maintains optimal cardiac output and hemodynamic stability  Description: INTERVENTIONS:  - Monitor vital signs, rhythm, and trends  - Monitor for bleeding, hypotension and signs of decreased cardiac output  - Evaluate effectiveness of vasoactive medications to optimize hemodynamic stability  - Monitor arterial and/or venous puncture sites for bleeding and/or hematoma  - Assess quality of pulses, skin color and temperature  - Assess for signs of decreased coronary artery perfusion - ex. Angina  - Evaluate fluid balance, assess for edema, trend weights  Outcome: Progressing  Goal: Absence of cardiac arrhythmias or at baseline  Description: INTERVENTIONS:  - Continuous cardiac monitoring, monitor vital signs, obtain 12 lead EKG if indicated  - Evaluate effectiveness of antiarrhythmic and heart rate control medications as ordered  - Initiate emergency measures for life threatening arrhythmias  - Monitor electrolytes and administer replacement therapy as ordered  Outcome: Progressing     Problem: GASTROINTESTINAL - ADULT  Goal: Maintains or returns to baseline bowel function  Description: INTERVENTIONS:  - Assess bowel function  - Maintain adequate hydration with IV or PO as ordered and tolerated  - Evaluate effectiveness of GI medications  - Encourage mobilization and activity  - Obtain nutritional consult as needed  - Establish a toileting routine/schedule  - Consider collaborating with pharmacy to review patient's medication profile  Outcome: Progressing     Problem: GENITOURINARY - ADULT  Goal: Absence of urinary retention  Description:  INTERVENTIONS:  - Assess patient’s ability to void and empty bladder  - Monitor intake/output and perform bladder scan as needed  - Follow urinary retention protocol/standard of care  - Consider collaborating with pharmacy to review patient's medication profile  - Implement strategies to promote bladder emptying  Outcome: Progressing     Problem: PAIN - ADULT  Goal: Verbalizes/displays adequate comfort level or patient's stated pain goal  Description: INTERVENTIONS:  - Encourage pt to monitor pain and request assistance  - Assess pain using appropriate pain scale  - Administer analgesics based on type and severity of pain and evaluate response  - Implement non-pharmacological measures as appropriate and evaluate response  - Consider cultural and social influences on pain and pain management  - Manage/alleviate anxiety  - Utilize distraction and/or relaxation techniques  - Monitor for opioid side effects  - Notify MD/LIP if interventions unsuccessful or patient reports new pain  - Anticipate increased pain with activity and pre-medicate as appropriate  Outcome: Progressing

## 2024-06-03 NOTE — CARDIAC REHAB
CARDIAC REHAB HEART FAILURE EDUCATION    Handouts provided and reviewed: CHF Booklet.      Disease Process: Disease process reviewed.    Reviewed the following: DAILY WEIGHT MONITORING: reviewed, pt states she has a scale at home.      SODIUM RESTRICTION: reviewed      FLUID RESTRICTION: reviewed      RISK FACTORS: reviewed      SMOKING CESSATION: non smoker      HOME EXERCISE ACTIVITY: reviewed      OUTPATIENT CARDIAC REHAB: discussed cardiac rehab phase 2, contact information given.      WHEN TO CONTACT YOUR PHYSICIAN: reviewed warning signs      HEART FAILURE CLINIC: [phone]

## 2024-06-03 NOTE — IMAGING NOTE
1150  Patient to MRI holding room. [employer] pacemaker representative present to program patient's ICD / pacemaker to MRI safe mode. HR set DOO 70 BPM. VSS.     1200 VS  HR 70  /54  SPO2 98% ON RA.     1210  MRI SCAN BEGINS    1211  VS  HR 70  /66  SPO2 96%    1220  VS  HR 70  /61  SPO2 96%    1230  VS  HR 70  /59  SPO2 95%    1233  MRI SCAN COMPLETE.     1234  Patient to MRI holding room. [employer] pacemaker representative present to program patient's pacemaker / ICD to original settings. VSS.

## 2024-06-03 NOTE — CM/SW NOTE
06/03/24 1000   Discharge disposition   Expected discharge disposition Home-Health   Post Acute Care Provider   ([facility])   Discharge transportation Private car     Pt discussed during nursing rounds. Pt is stable for DE today. MD DE order entered. [facility] will provide RN and therapy services at DE, agency notified of dc home today. Pt's son will provide transport at DE.    Plan: Home w/spouse with [facility] today.    / to remain available for support and/or discharge planning.     KIM Springer    [phone]

## 2024-06-03 NOTE — BH RN DISCHARGE NOTE
Report received from Tara GIL. Pt alert and oriented x4. Neuro Q4. Tele. X1 assist with walker. PRN Norco given.    Pt ready and cleared for discharge. Discussed with neurosurgery. IV and tele removed. Pt education and paperwork provided. Discussed follow up appointments. All questions answered. Pt taken downstairs in wheelchair and driven home by family.

## 2024-06-03 NOTE — PROGRESS NOTES
Progress Note  Margaret Silverio Patient Status:  Inpatient    3/14/1945 MRN [id number]   Location [facility]5W Attending Regan Cruz MD   Hosp Day # 10 PCP Johnathon Rodriguez DO     Subjective:  Pt resting in bed, denies any cardiac complaints. Still has the neck pain that is radiating to the head.    Objective:  /58 (BP Location: Right arm)   Pulse 65   Temp 97.5 °F (36.4 °C) (Oral)   Resp 16   Ht 5' 5\" (1.651 m)   Wt 120 lb 9.6 oz (54.7 kg)   SpO2 100%   BMI 20.07 kg/m²     Telemetry: AV paced      Intake/Output:    Intake/Output Summary (Last 24 hours) at 6/3/2024 1414  Last data filed at 6/3/2024 1100  Gross per 24 hour   Intake 1396 ml   Output 1350 ml   Net 46 ml       Last 3 Weights   24 0534 120 lb 9.6 oz (54.7 kg)   24 0639 120 lb 6.4 oz (54.6 kg)   24 0855 129 lb 12.8 oz (58.9 kg)   24 0548 138 lb 11.2 oz (62.9 kg)   24 0554 134 lb 1.6 oz (60.8 kg)   24 0440 129 lb 4.8 oz (58.7 kg)   24 0631 128 lb 8 oz (58.3 kg)   24 0534 127 lb (57.6 kg)   24 0642 130 lb 8 oz (59.2 kg)   24 0500 124 lb 12.8 oz (56.6 kg)   24 0504 123 lb 10.9 oz (56.1 kg)   24 2241 123 lb 14.4 oz (56.2 kg)   24 1724 125 lb 10.6 oz (57 kg)   24 1704 129 lb 6.6 oz (58.7 kg)   24 0633 127 lb (57.6 kg)   24 0510 119 lb 8 oz (54.2 kg)   24 0500 129 lb 12.8 oz (58.9 kg)       Labs:  Recent Labs   Lab 24  0539 24  0453 24   GLU 89 86 77   BUN 45* 38* 37*   CREATSERUM 1.63* 1.50* 1.40*   EGFRCR 32* 35* 39*   CA 9.1 8.9 8.9    137 140   K 4.0  4.0 4.4 3.9    108 109   CO2 23.0 22.0 23.0     Recent Labs   Lab 24   RBC 2.93* 3.03* 3.22*   HGB 9.4* 9.4* 10.0*   HCT 29.2* 31.1* 32.9*   MCV 99.7 102.6* 102.2*   MCH 32.1 31.0 31.1   MCHC 32.2 30.2* 30.4*   RDW 21.9* 21.2* 20.9*   WBC 6.7 6.7 6.0   .0 361.0 368.0         No results for  input(s): \"TROP\", \"TROPHS\", \"CK\" in the last 168 hours.  Lab Results   Component Value Date    INR 1.25 (H) 02/09/2024    INR 1.60 (H) 01/15/2024    INR 2.60 (H) 01/14/2024       Diagnostics:     Review of Systems   Respiratory: Negative.     Cardiovascular: Negative.    Musculoskeletal:  Positive for neck pain.         Physical Exam:    Physical Exam  Vitals reviewed.   Constitutional:       General: She is not in acute distress.  Neck:      Vascular: No JVD.   Cardiovascular:      Rate and Rhythm: Normal rate and regular rhythm.      Pulses: Normal pulses.           Radial pulses are 2+ on the right side and 2+ on the left side.        Dorsalis pedis pulses are 2+ on the right side and 2+ on the left side.      Heart sounds: S1 normal and S2 normal. Murmur heard.      Systolic murmur is present with a grade of 2/6.      No friction rub. No gallop.   Pulmonary:      Effort: Pulmonary effort is normal. No respiratory distress.      Breath sounds: Decreased air movement present. No wheezing, rhonchi or rales.      Comments: Fine crackles scattered  Musculoskeletal:      Cervical back: Normal range of motion and neck supple.      Right lower leg: No edema.      Left lower leg: No edema.   Skin:     General: Skin is warm and dry.   Neurological:      Mental Status: She is alert and oriented to person, place, and time.         Medications:   bumetanide  1 mg Oral Daily    isosorbide dinitrate  20 mg Oral TID (Nitrates)    hydrALAZINE  25 mg Oral Q8H BROOKLYNN    gabapentin  300 mg Oral BID    polyethylene glycol (PEG 3350)  17 g Oral Daily    sennosides  8.6 mg Oral BID    lidocaine-menthol  1 patch Transdermal Daily    amiodarone  200 mg Oral Daily    carvedilol  3.125 mg Oral BID with meals    folic acid  800 mcg Oral Daily    mirtazapine  7.5 mg Oral Nightly    pantoprazole  40 mg Oral BID AC    sacubitril-valsartan  1 tablet Oral BID         Assessment/Plan:  Pt was recently admitted for hypotension and dehydration,  discharged off diuretics, admitted on 5/25 with fluid overload      Acute on chronic HFrEF, NICM  - echo from 2/24 with LVEF 15-20%  - s/p CD/ICD  - chest xray 5/28/24  pulmonary edema   - BNP more than 5000 on admission  - pt was on bipap, now on room air   - I&O noted with good urine output  -- pt was on milrinone gtt to help with renal perfusion, now stopped   GDMT BB, Bumex, hydralazine, isosorbide, entresto     Valvular dysfunction  - severe TR   - h/o bioprosthetic mitral valve replacement     Atrial flutter/Fibrillation  - Tele AV paced  - on amiodarone  - s/p watchman device     CKD 3  - baseline creatinine of 1.4-1.6  - Serum creatinine stable at 1.4  - nephrology following     Plan:  - euvolemic on exam  - continue IV Bumex, coreg, hydralazine, isordil, entresto  - continue amiodarone,   - discussed with pt about monitoring weight daily and to reach out to cardiology if more than 2lb wt gain   - further management per KODI Oreilly  [facility]  6/3/2024  2:14 PM    CARDIOLOGY ATTENDING    Notes reviewed, patient examined.    Appears euvolemic on exam.    Stable blood pressure and renal function.    AV paced rhythm, on amiodarone.  Watchman device in place.    Resumption of GDMT.    Stable for discharge today.  Will switch to p.o. diuretic.

## 2024-06-03 NOTE — DISCHARGE SUMMARY
[place] Hospitalist Discharge Summary   Patient ID:  Margaret Silverio  [id number]  79 year old  3/14/1945    Admit date: 5/24/2024  Discharge date: 6/3/2024  Primary Care Physician: Johnathon Rodriguez DO   Attending Physician: Regan Cruz MD   Consults:   Consultants         Provider   Role Specialty     Juan Gomes MD      Consulting Physician NEUROSURGERY     Behar, Alex, MD      Consulting Physician Physical Medicine     Felicitas Lee DO      Consulting Physician Physical Medicine     Theodore Lr MD      Consulting Physician Interventional, Cardiology     Radha Ahn MD      Consulting Physician NEPHROLOGY     Augustin Parsons DO      Consulting Physician PULMONARY DISEASES            Discharge Diagnoses:   Acute respiratory failure with hypoxia (HCC)    Reason for admission  Copied from admission H&P: This is a 79 year oldfemale who presented complaining of shortness of breath.  Patient stated symptoms started on the evening prior to admission.  Progressively worsening on the day of admission prompting visit for ED for further evaluation.  Patient denies associated cough or chest pain.  Patient denied associated lower extremity edema.  Patient denied recent sick contacts, fevers or chills.  Of note, patient was recently admitted to the hospital and discharged on 5/18.  At that time she was being treated for hypotension with hypovolemia and nonischemic cardiomyopathy.     Hospital Course:  Acute hypoxic respiratory failure - resolved.   Secondary to Pulmonary edema from Acute CHF and anemia.   Initially on NIPPV now on RA.   CXR pulmonary edema. BNP > 5000  Was diuresed with lasix however developed GEORGE. Better   Pulmonary and cards on consult.   Dana Blackman ellipta   Acute on chronic HFrEF, NICM   ECHO from 2/24 LVEF 15-20%   S/p AICD   GDMT: coreg, resume entresto, hydralazine.   Received Lasix 40mg IV for volume overload.   Bumex 1mg PO daily start tomorrow.   Off milrinone    GEORGE on CKD III   Baseline creat 1.4-1.6   Improved.   Likely secondary to overdiuresis vs cardiorenal syndrome from poor EF.    Renal on consult.   Labs improving.    Acute anemia  Baseline Hb 10-12 per EMR back in Feb  Iron panel ok.   Had Colonoscopy Jan 2024 found to have AVM's   No signs of blood loss at this time.   GI on consult  No overt bleeding. H/H improved today   Transfuse 1 unit PRBC   5.     Acute on Chronic neck pain with radiculopathy            Multilevel spondylosis and instability C3-4, C4-5   Gabapentin 300mg BID, norco, flexeril. Lidoderm patch   Physiatry consult possible outpt GIUSEPPE   MRI C spine done today. Per neurosurgery ok to discharge home they will fu on results outpt in clinic. She will go with hard collar in place.   Neurosurgery on consult  Hard collar for instability seen on XR C spine C3-4     EXAM:   GENERAL: no apparent distress, comfortable  NEURO: A/A Ox3, no focal deficits  RESP: non labored, CTAB/L  CARDIO: Regular, no murmur  ABD: soft, NT, ND  EXTREMITIES: no edema, no calf tenderness    Operative Procedures:     Discharge Instructions     Medication List        START taking these medications      gabapentin 300 MG Caps  Commonly known as: Neurontin  Take 1 capsule (300 mg total) by mouth 2 (two) times daily.     isosorbide dinitrate 20 MG Tabs  Commonly known as: Isordil  Take 1 tablet (20 mg total) by mouth TID (Nitrates).     lidocaine-menthol 4-1 % Ptch  Place 1 patch onto the skin daily.  Start taking on: June 4, 2024            CONTINUE taking these medications      alendronate 70 MG Tabs  Commonly known as: Fosamax  Take 1 tablet (70 mg total) by mouth once a week.     amiodarone 200 MG Tabs  Commonly known as: Pacerone     carvedilol 3.125 MG Tabs  Commonly known as: Coreg  Take 1 tablet (3.125 mg total) by mouth 2 (two) times daily with meals.     ferrous sulfate 325 (65 FE) MG Tbec     folic acid 800 MCG Tabs  Commonly known as: FOLVITE  Take 1 tablet (800 mcg  total) by mouth daily.     HYDROcodone-acetaminophen  MG Tabs  Commonly known as: Norco  Take 1 tablet by mouth every 6 (six) hours as needed for Pain.     methotrexate 2.5 MG Tabs  Commonly known as: Rheumatrex  TAKE 6 TABLETS BY MOUTH 1 TIME A WEEK     mirtazapine 7.5 MG Tabs  Commonly known as: Remeron  Take 1 tablet (7.5 mg total) by mouth nightly.     pantoprazole 40 MG Tbec  Commonly known as: Protonix  TAKE 1 TABLET(40 MG) BY MOUTH TWICE DAILY BEFORE MEALS     sacubitril-valsartan 49-51 MG Tabs  Commonly known as: Entresto  Take 1 tablet by mouth 2 (two) times daily.            STOP taking these medications      ciprofloxacin 250 MG Tabs  Commonly known as: Cipro               Where to Get Your Medications        These medications were sent to [id number] - [place], [place] - [address] AT [facility], [phone], [phone] [address], [facility] [address]      Phone: [phone]   gabapentin 300 MG Caps  isosorbide dinitrate 20 MG Tabs  lidocaine-menthol 4-1 % Ptch         Activity: activity as tolerated  Diet: cardiac diet  Wound Care: NA  Code Status: DNAR/Selective Treatment        Discharge Instructions         Sometimes managing your health at home requires assistance.  The [facility] team has recognized your preference to use [facility].  They can be reached at [phone].  The fax number for your reference is [phone].  A representative from the home health agency will contact you or your family to schedule your first visit.             Important follow up:   Follow-up Information       Johnathon Rodriguez, DO Follow up in 1 week(s).    Specialty: Family Medicine  Contact information:  [address] [place] [address] [address]  [facility] [id number] [phone]               Theodore Lr MD Follow up in 2 week(s).    Specialties: Interventional, Cardiology, CARDIOLOGY  Contact information:  [address]  [facility] [address]  [facility]  [address]  [phone]               Danial Mehta MD Follow up in 1 week(s).    Specialty: NEUROSURGERY  Contact information:  [address] [facility]  [address]  [place] [address]  [phone]               Felicitas Lee,  Follow up in 3 week(s).    Specialties: Physical Medicine, Sports Medicine, PHYSIATRY  Contact information:  [address] [facility]  [address]  [place] [address]  [phone]               Keerthi Naqvi MD Follow up in 1 week(s).    Specialty: NEPHROLOGY  Contact information:  [address]  [place] [address]  [phone]                             -PCP in [] within 7 days [] within 14 days [] other     Disposition: home  Discharged Condition: good    Hospital Discharge Diagnoses:  Acute exacerbation CHF    Lace+ Score: 83  59-90 High Risk  29-58 Medium Risk  0-28   Low Risk.    TCM Follow-Up Recommendation:  LACE > 58: High Risk of readmission after discharge from the hospital.            Total Time Coordinating Care: Greater than 30 minutes    Patient had opportunity to ask questions, state understanding, and agree with therapeutic plan as outlined    Regan Cruz MD  Hospitalist  6/3/2024

## 2024-06-04 ENCOUNTER — PATIENT OUTREACH (OUTPATIENT)
Dept: CASE MANAGEMENT | Age: 79
End: 2024-06-04

## 2024-06-04 ENCOUNTER — TELEPHONE (OUTPATIENT)
Dept: FAMILY MEDICINE CLINIC | Facility: CLINIC | Age: 79
End: 2024-06-04

## 2024-06-04 DIAGNOSIS — Z02.9 ENCOUNTERS FOR UNSPECIFIED ADMINISTRATIVE PURPOSE: ICD-10-CM

## 2024-06-04 DIAGNOSIS — J96.01 ACUTE RESPIRATORY FAILURE WITH HYPOXIA (HCC): Primary | ICD-10-CM

## 2024-06-04 PROCEDURE — 1159F MED LIST DOCD IN RCRD: CPT

## 2024-06-04 PROCEDURE — 1111F DSCHRG MED/CURRENT MED MERGE: CPT

## 2024-06-04 RX ORDER — HYDROCODONE BITARTRATE AND ACETAMINOPHEN 10; 325 MG/1; MG/1
1 TABLET ORAL EVERY 6 HOURS PRN
Qty: 60 TABLET | Refills: 0 | Status: SHIPPED | OUTPATIENT
Start: 2024-06-04

## 2024-06-04 NOTE — TELEPHONE ENCOUNTER
Spoke with patient for TCM today--readmit. Pt confirms 6/25/2024 MyChart appt with PCP--made 4/17/2024--previous to the two recent hospitalizations. Patient agreeable to TCM appt with Dr. Rodriguez, only--declines TCM appt with TIARRA Sampson.     This NCM unable to book 40 minute TCM appt with PCP, as requested, within recommended 2 week TCM timeframe--without PCP approval.    TCM appointment recommended by 6/10/2024, as patient is a High risk for readmission.  Please advise.    BOOK BY DATE (last date for TCM): 6/17/2024    Please discuss with PCP if 20 minute TCM or res24 appt can be used for TCM and follow-up with patient, accordingly.  Thank you!           Follow-up Information    Follow up With Specialties Details Why Contact Info   Johnathon Rodriguez, DO Family Medicine Follow up in 1 week(s)  [address] [facility] [address]  [facility] [address]  [phone]   Theodore Lr MD Interventional, Cardiology, CARDIOLOGY Follow up in 2 week(s)  [address]   [address]  [place] [address]  [phone]   Danial Mehta MD NEUROSURGERY Follow up in 1 week(s)  [address] [facility]  [address]  [place] [address]  [phone]   Felicitas Lee, DO Physical Medicine, Sports Medicine, PHYSIATRY Follow up in 3 week(s)  [address] [facility]  [address]  [place] [address]  [phone]   Keerthi Naqvi MD NEPHROLOGY Follow up in 1 week(s)  [address]   [address]  [place] [address]  [phone]       Future Appointments   Date Time Provider Department Center   6/25/2024  2:40 PM Johnathon Rodriguez, DO [facility]

## 2024-06-04 NOTE — PROGRESS NOTES
Initial Post Discharge Follow Up   Discharge Date: 6/3/24  Contact Date: 6/4/2024    Consent Verification:  Assessment Completed With: Patient  HIPAA Verified?  Yes    Discharge Dx:   Acute respiratory failure with hypoxia  CHF  Cervical pain  Cervical radiculopathy    General:   How have you been since your discharge from the hospital? Pt feeling better, since hospital discharge--wearing Aspen collar, at all times, as instructed, ambulating with walker at home, appetite good. Intermittent neck pain 5/10 managed well at home with Hydrocodone and Lidocaine patch. Pt denies fever, chills, headache, vision changes, dizziness, nausea, vomiting, diarrhea, bleeding, numbness or tingling to extremities, abdominal distension or LE edema, chest pain or shortness of breath at this time.   Do you have any pain since discharge?  Yes  Where: posterior neck   Rating on pain scale 1-10, 10 being the worst pain you have ever experienced, what is current pain: 5  Alleviating factors: rest, Aspen collar, Hydrocodone, lidocaine patch  Aggravating factors: positional  Is the pain manageable at home? Yes  How well was your pain managed while in the hospital?   On a scale of 1-5   1- Very Poor and 5- Very well   Very Well  When you were leaving the hospital were your discharge instructions reviewed with you? Yes  How well were your discharge instructions explained to you?   On a scale of 1-5   1- Very Poor and 5- Very well   Very Well  Do you have any questions about your discharge instructions?  No  Before leaving the hospital was your diagnoses explained to you? Yes  Do you have any questions about your diagnoses? No  Are you able to perform normal daily activities of living as you have prior to your hospital stay (dressing, bathing, ambulating to the bathroom, etc)? yes, with walker  (NCM) Was patient given a different diet per AVS? no--see 6/3/24 cardiac rehab notes  If so, which diet?  HF diet  Are there any barriers to following  this diet? no    Medications:   Current Outpatient Medications   Medication Sig Dispense Refill    HYDROcodone-acetaminophen (NORCO)  MG Oral Tab Take 1 tablet by mouth every 6 (six) hours as needed for Pain. 60 tablet 0    gabapentin 300 MG Oral Cap Take 1 capsule (300 mg total) by mouth 2 (two) times daily. 60 capsule 0    lidocaine-menthol 4-1 % External Patch Place 1 patch onto the skin daily. 30 patch 0    isosorbide dinitrate 20 MG Oral Tab Take 1 tablet (20 mg total) by mouth TID (Nitrates). 90 tablet 0    mirtazapine 7.5 MG Oral Tab Take 1 tablet (7.5 mg total) by mouth nightly. 30 tablet 0    PANTOPRAZOLE 40 MG Oral Tab EC TAKE 1 TABLET(40 MG) BY MOUTH TWICE DAILY BEFORE MEALS 180 tablet 0    CARVEDILOL 3.125 MG Oral Tab Take 1 tablet (3.125 mg total) by mouth 2 (two) times daily with meals. 60 tablet 1    SACUBITRIL-VALSARTAN 49-51 MG Oral Tab Take 1 tablet by mouth 2 (two) times daily. 60 tablet 1    folic acid 800 MCG Oral Tab Take 1 tablet (800 mcg total) by mouth daily. 90 tablet 0    amiodarone 200 MG Oral Tab Take 1 tablet (200 mg total) by mouth daily.      methotrexate 2.5 MG Oral Tab TAKE 6 TABLETS BY MOUTH 1 TIME A WEEK 77 tablet 0    alendronate 70 MG Oral Tab Take 1 tablet (70 mg total) by mouth once a week. 12 tablet 0    ferrous sulfate 325 (65 FE) MG Oral Tab EC Take 1 tablet (325 mg total) by mouth daily with breakfast.       Were there any changes to your current medication(s) noted on the AVS? Yes  START taking:  gabapentin (Neurontin)  isosorbide dinitrate (Isordil)  lidocaine-menthol  Start taking on: June 4, 2024  STOP taking:  ciprofloxacin 250 MG Tabs (Cipro)  If so, were these medication changes discussed with you prior to leaving the hospital? Yes  If a new medication was prescribed:    Was the new medication's purpose & side effects reviewed? Yes  Do you have any questions about your new medication? No  Did you  your discharge medications when you left the hospital?  Yes  Let's go over your medications together to make sure we are not missing anything. Medications Reviewed  Are there any reasons that keep you from taking your medication as prescribed? No  Are you having any concerns with constipation? No  Did patient receive their flu shot (Sept-March)? Yes--2/23/24    Discharge medications reviewed/discussed/and reconciled against outpatient medications with patient.  Any changes or updates to medications sent to PCP.  Patient Acknowledged     Referrals/orders at D/C:  Referrals/orders placed at D/C? yes  What services:   Home health--RN, PT/OT   (If HH was ordered) Has HH been set up?  Yes    If Yes: With Whom: [facility]  DME ordered at D/C? Yes  What? [place] collar  From where? hospital  Have you received your (DME)? yes    Discharge orders, AVS reviewed and discussed with patient. Any changes or updates to orders sent to PCP.  Patient Acknowledged      SDOH:   Transportation:  .SDOHTRN  Financial Strain:  .SDOHFRS    If yes:  [facility]    Diagnosis specifics:   CHF:  CHF:   With your CHF diagnosis weighing yourself is very important  How often are you weighing yourself? daily  Is there any reason you are unable to weigh yourself daily?  No  What was your weight yesterday? 120 lb 9.6 oz in hospital   Today? 123 lb  Were you told about any fluid restrictions? Yes  Have you noticed any shortness of breath or waking up short of breath? no  Since discharge do you feel you are urinating more or less?  less    Are you urinating more at night? yes    Do you notice any pain or swelling in your abdomen?   no      Ankles or Legs?   no  Re-admit: Re-Admit:  Tell me what led up to your readmission: presented complaining of shortness of breath. Patient stated symptoms started on the evening prior to admission. Progressively worsening on the day of admission prompting visit for ED for further evaluation. Patient denies associated cough or chest pain. Patient denied  associated lower extremity edema. Patient denied recent sick contacts, fevers or chills. Of note, patient was recently admitted to the hospital and discharged on 5/18. At that time she was being treated for hypotension with hypovolemia and nonischemic cardiomyopathy.   Did you call your PCP office first? no  Did you attempt to get in with your PCP?  no  Do you feel this could have been prevented? no          Follow up appointments:    Follow-up Information     Follow up With Specialties Details Why Contact Info   Johnathon Rodriguez, DO Family Medicine Follow up in 1 week(s)   [address] [facility]  [address]  [facility] [address]  [phone]   Theodore Lr MD Interventional, Cardiology, CARDIOLOGY Follow up in 2 week(s)   [address] [facility]  [address]  [facility] [address]  [phone]   Danial Mehta MD NEUROSURGERY Follow up in 1 week(s)   [address] [facility]  [address]  [facility] [address]  [phone]   Felicitas Lee,  Physical Medicine, Sports Medicine, PHYSIATRY Follow up in 3 week(s)   [address] [facility]  [address]  [facility] [address]  [phone]   Keerthi Naqvi MD NEPHROLOGY Follow up in 1 week(s)   [address]  [address]  [facility] [address]  [phone]     Your appointments       Date & Time Appointment Department (Center)    Jun 25, 2024 2:40 PM CDT Follow Up Visit with Johnathon Rodriguez,  [facility])    Contact your primary care provider if your insurance requires a referral.    Please arrive 15 minutes prior to your scheduled appointment. Be sure to bring your current Insurance card, Photo ID, and medication bottles or a list of your current medications.      A 24 hour notice is required to cancel any appointment or you may be charged a $40 No Show Fee.     Important: 24 hour notice is required to cancel any appointment or you may be charged a $40 No Show Fee. Please notify your physician office.               [facility]  [address] [facility] [address]  [facility] [address]  [phone]            TCC  Was TCC ordered: No    PCP (If no TCC appointment)  Does patient already have a PCP appointment scheduled? Yes  NCM Attempted to schedule PCP office TCM appointment with patient   If no appointment scheduled: Explain: pt requests TCM with PCP only    Specialist    Does the patient have any other follow up appointment(s) needing to be scheduled? Yes  If yes: NCM reviewed upcoming specialist appointment with patient: Yes  Does the patient need assistance scheduling appointment(s): Yes, message sent to TST team    Is there any reason as to why you cannot make your appointment(s)?  No     Needs post D/C:   Now that you are home, are there any needs or concerns you need addressed before your next visit with your PCP?  (DME, meds, questions, etc.): No    Interventions by NCM:   Discussed diet, activity, medications and need for f/u visits. Sent appt request to TST for specialist f/u appt, per pt request. [facility] RN scheduled to see pt today for start of care.  Pt confirms 6/25/2024 MyChart appt with PCP--made 4/17/2024--previous to the two recent hospitalizations. Patient agreeable to TCM appt with Dr. Rodriguez, only--declines TCM appt with TIARRA Sampson. This NCM unable to book 40 minute TCM appt with PCP, as requested, within recommended 2 week TCM timeframe--without PCP approval. Sent TE to office staff for appt clarification and f/u.  Patient aware when to contact PCP/specialists and when to seek emergency care. No further questions/concerns at this time.    Overall Rating:   How would you rate the care you received while in the hospital? excellent    CCM referral placed:    Yes    BOOK BY DATE: 6/17/2024

## 2024-06-04 NOTE — PROGRESS NOTES
TCM has contacted patient and patient needs additional appointments. This [facility] sent TE to office staff for f/u appt with PCP.    Please contact patient for assistance with scheduling a follow-up appointment for Cardiology and neurosurgery, physiatry and nephrology .  Thank you!           Follow-up Information    Follow up With Specialties Details Why Contact Info   Johnathon Rodriguez, DO Family Medicine Follow up in 1 week(s)  [address] [facility] [address]  [facility] [address]  [phone]   Theodore Lr MD Interventional, Cardiology, CARDIOLOGY Follow up in 2 week(s)  [address]  [place] [address]  [phone]   Danial Mehta MD NEUROSURGERY Follow up in 1 week(s)  [address]  [address]  [place] [address]  [phone]   Felicitas Lee, DO Physical Medicine, Sports Medicine, PHYSIATRY Follow up in 3 week(s)  [address] [facility]  [address]  [place] [address]  [phone]   Keerthi Naqvi MD NEPHROLOGY Follow up in 1 week(s)  [address]   [address]  [place] [address]  [phone]

## 2024-06-04 NOTE — TELEPHONE ENCOUNTER
Please review.  Protocol failed / Has no protocol.    Recent fills each quantity 60 : 3/16, 4/10, 4/29, 5/14  Last prescription written: 5/10/24  Last office visit: 8/21/23    Future Appointments   Date Time Provider Department Center   6/25/2024  2:40 PM Johnathon Rodriguez DO [facility]       Requested Prescriptions   Pending Prescriptions Disp Refills    HYDROcodone-acetaminophen (NORCO)  MG Oral Tab 60 tablet 0     Sig: Take 1 tablet by mouth every 6 (six) hours as needed for Pain.       Controlled Substance Medication Failed - 5/31/2024  3:02 PM        Failed - This medication is a controlled substance - forward to provider to refill           Future Appointments         Provider Department Appt Notes    In 3 weeks Johnathon Rodriguez DO [facility] **DUE FOR MA APPOINTMENT**  Hospital and rehab stay          Recent Outpatient Visits              1 month ago Cervical radiculopathy    [facility], [address], [place] Felicitas Lee,     Office Visit    1 month ago Stage 3a chronic kidney disease (HCC)    [facility], [facility] Eyad Angeles MD    Office Visit    5 months ago Arthropathy of cervical facet joint    [facility], Felicitas Love,     Office Visit    9 months ago Cervical radiculopathy    [facility], Felicitas Love,     Office Visit    9 months ago Medicare annual wellness visit, initial    [facility] Johnathon Rodriguez,     Office Visit

## 2024-06-04 NOTE — PROGRESS NOTES
Left message on mailbox for pt to call NCM back for TCM.  [facility] contact information [phone]  included in message.        Discharge Dx:   Acute respiratory failure with hypoxia  CHF  Cervical pain  Cervical radiculopathy      Follow up appointments:    Follow-up Information    Follow up With Specialties Details Why Contact Info   Johnathon Rodriguez DO Family Medicine Follow up in 1 week(s)  [address] [facility]  [address]  [facility] [address]  [phone]   Theodore Lr MD Interventional, Cardiology, CARDIOLOGY Follow up in 2 week(s)  [address]  [place] [address]  [phone]   Danial Mehta MD NEUROSURGERY Follow up in 1 week(s)  [address] [facility]  [address]  [place] [address]  [phone]   Felicitas Lee, DO Physical Medicine, Sports Medicine, PHYSIATRY Follow up in 3 week(s)  [address] [facility]  [address]  [place] [address]  [phone]   Keerthi Naqvi MD NEPHROLOGY Follow up in 1 week(s)  [address]   [address]  [place] [address]  [phone]     Future Appointments   Date Time Provider Department Center   6/25/2024  2:40 PM Johnathon Rodriguez DO [facility]

## 2024-06-05 NOTE — TELEPHONE ENCOUNTER
Dr. Rodriguez,   See message below in regards to TCM, ok to use a 20 minute slot for her follow up?

## 2024-06-05 NOTE — PROGRESS NOTES
TCM request (discharged 06/03)    Dr Danial Mehta  NEUROSURGERY  [facility]  [address]  [address]  [place] [address]  [phone]  Follow up 1 week  Apt made:  Wed 06/12 @9:40am     Dr Eyad Arvizu  NEPHROLOGY  [facility]  [address]  [address]  [place] [address]  [phone]  Follow up 1 week  Apt made:  Tue 06/25 @3:00pm & on waitlist    Dr Theodore Lr  Interventional, Cardiology, CARDIOLOGY  [facility]  [address]  [address]  [place] [address]  [phone]  Follow up 2 weeks  Apt made:  Fri 06/07 @11:00am w/KODI Arre[address] Dr  Physical Medicine, Sports Medicine, PHYSIATRY  [facility]  [address]  [address]  [place] [address]  [phone]  Follow up 3 weeks  Apt made:  Wed 06/26 @10:15am  Confirmed w/pt  Closing encounter

## 2024-06-06 ENCOUNTER — TELEPHONE (OUTPATIENT)
Dept: FAMILY MEDICINE CLINIC | Facility: CLINIC | Age: 79
End: 2024-06-06

## 2024-06-06 ENCOUNTER — TELEPHONE (OUTPATIENT)
Dept: CARDIOLOGY CLINIC | Facility: HOSPITAL | Age: 79
End: 2024-06-06

## 2024-06-06 RX ORDER — METHOTREXATE 2.5 MG/1
TABLET ORAL
Qty: 77 TABLET | Refills: 0 | Status: SHIPPED | OUTPATIENT
Start: 2024-06-06

## 2024-06-06 NOTE — TELEPHONE ENCOUNTER
Please review. Protocol Failed; No Protocol    Requested Prescriptions   Pending Prescriptions Disp Refills    METHOTREXATE 2.5 MG Oral Tab [Pharmacy Med Name: METHOTREXATE 2.5MG TABLETS - YELLOW] 77 tablet 0     Sig: TAKE 6 TABLETS BY MOUTH 1 TIME A WEEK       There is no refill protocol information for this order            Future Appointments         Provider Department Appt Notes    In 6 days Danial Mehta MD [facility] follow up  for  for cervical radiculopathy.  MRI and XR in chart *CG    In 2 weeks Johnathon Rodriguez, DO [facility] **DUE FOR MA APPOINTMENT**  Hospital and rehab stay    In 2 weeks Eyad Arvizu MD [facility] Follow Up - schduled by Belinda [facility] ext [phone]    In 2 weeks Felicitas Lee,  [facility] 3 week f/u ED          Recent Outpatient Visits              1 month ago Cervical radiculopathy    [facility], [address], [place] Felicitas Lee, DO    Office Visit    1 month ago Stage 3a chronic kidney disease (HCC)    [facility] Eyad Arvizu MD    Office Visit    5 months ago [facility]th[facility] facet [facility]    Office Visit    9 months ago Cervical radiculopathy    [facility] Felicitas Lee, DO    Office Visit    9 months ago Medicare annual wellness visit, initial    [facility] Johnathon Rodriguez, DO    Office Visit

## 2024-06-06 NOTE — TELEPHONE ENCOUNTER
Quita from [facility] called asking if  would be willing to sign home health orders for the patient.

## 2024-06-06 NOTE — TELEPHONE ENCOUNTER
----- Message from MAURA LEE sent at 6/5/2024 10:10 PM CDT -----  Regarding: Hospital f/u appt for CHF  Can you call pt to set up Hospital f/u appt for CHF for about 4 weeks?, Discharged 6/3/24, she sees Gail Wasserman 6/7/24, seeing  PCP and Dr. Arvizu 6/25/24.   Thx.

## 2024-06-06 NOTE — TELEPHONE ENCOUNTER
Yes, I will sign all documents related to this patient's health care.  Thank you.  Please call the caller and let them know.

## 2024-06-07 NOTE — TELEPHONE ENCOUNTER
[employer] rep Schneiderely has been informd of below information and she verbalized understanding.

## 2024-06-10 RX ORDER — ALENDRONATE SODIUM 70 MG/1
70 TABLET ORAL WEEKLY
Qty: 12 TABLET | Refills: 3 | Status: SHIPPED | OUTPATIENT
Start: 2024-06-10

## 2024-06-10 NOTE — TELEPHONE ENCOUNTER
Refill Per Protocol     Requested Prescriptions   Pending Prescriptions Disp Refills    ALENDRONATE 70 MG Oral Tab [Pharmacy Med Name: ALENDRONATE 70MG TABLETS] 12 tablet 0     Sig: TAKE 1 TABLET(70 MG) BY MOUTH 1 TIME A WEEK       Osteoporosis Medication Protocol Passed - 6/6/2024 10:26 AM        Passed - In person appointment or virtual visit in the past 6 mos or appointment in next 3 mos     Recent Outpatient Visits              1 month ago Cervical radiculopathy    [facility], [address], [place] Felicitas Lee, DO    Office Visit    1 month ago Stage 3a chronic kidney disease (HCC)    [facility] Eyad Arvizu MD    Office Visit    5 months ago Arthropathy of cervical facet joint    [facility] Felicitas Lee, DO    Office Visit    9 months ago Cervical radiculopathy    [facility] Felicitas Lee, DO    Office Visit    9 months ago Medicare annual wellness visit, initial    [facility] Johnathon Rodriguez, DO    Office Visit          Future Appointments         Provider Department Appt Notes    In 2 days Danial Mehta MD [facility] follow up  for  for cervical radiculopathy.  MRI and XR in chart *CG    In 3 days Johnathon Rodriguez,  [facility] **DUE FOR MA APPOINTMENT**  Hospital and rehab stay - ok per Dr celina ABRAMS 6/4/24 *RM    In 2 weeks Eyad Arvizu MD [facility] Follow Up - schduled by Belinda [facility] ext [phone]    In 2 weeks Felicitas Lee,  [facility] 3 week f/u ED    In 3 weeks Davina Wright, NP [facility] BMP/BNP @lab                    Passed - DEXA scan within past 2  years        Passed - CMP within the past 12 months        Passed - Calcium level between 8.3 and 10.3     Lab Results   Component Value Date    CA 8.9 06/02/2024               Passed - GFR level greater than 35     GFR Evaluation  EGFRCR: 39 , resulted on 6/2/2024                 Future Appointments         Provider Department Appt Notes    In 2 days Danial Mehta MD [facility] follow up  for  for cervical radiculopathy.  MRI and XR in chart *CG    In 3 days Johnathon Rodriguez, DO [facility] **DUE FOR MA APPOINTMENT**  Hospital and rehab stay - ok per  see TE 6/4/24 *RM    In 2 weeks Eyad Arvizu MD [facility] Follow Up - schduled by Belinda [facility] ext [phone]    In 2 weeks Felicitas Lee,  [facility] 3 week f/u ED    In 3 weeks Davina Wright, NP [facility] BMP/BNP @lab          Recent Outpatient Visits              1 month ago Cervical radiculopathy    [facility], [place] Felicitas Lee, DO    Office Visit    1 month ago Stage 3a chronic kidney disease (HCC)    [facility] Eyad Arvizu MD    Office Visit    5 months ago Arthropathy of cervical facet joint    [facility] Felicitas Lee, DO    Office Visit    9 months ago Cervical radiculopathy    [facility] Felicitas Lee, DO    Office Visit    9 months ago Medicare annual wellness visit, initial    [facility] Johnathon Rodriguez, DO    Office Visit

## 2024-06-12 ENCOUNTER — OFFICE VISIT (OUTPATIENT)
Dept: SURGERY | Facility: CLINIC | Age: 79
End: 2024-06-12
Payer: COMMERCIAL

## 2024-06-12 ENCOUNTER — TELEPHONE (OUTPATIENT)
Dept: FAMILY MEDICINE CLINIC | Facility: CLINIC | Age: 79
End: 2024-06-12

## 2024-06-12 VITALS
HEART RATE: 62 BPM | HEIGHT: 65 IN | SYSTOLIC BLOOD PRESSURE: 130 MMHG | DIASTOLIC BLOOD PRESSURE: 83 MMHG | BODY MASS INDEX: 20 KG/M2

## 2024-06-12 DIAGNOSIS — M54.12 CERVICAL RADICULOPATHY: Primary | ICD-10-CM

## 2024-06-12 PROCEDURE — 1111F DSCHRG MED/CURRENT MED MERGE: CPT | Performed by: NEUROLOGICAL SURGERY

## 2024-06-12 PROCEDURE — 1159F MED LIST DOCD IN RCRD: CPT | Performed by: NEUROLOGICAL SURGERY

## 2024-06-12 PROCEDURE — 3079F DIAST BP 80-89 MM HG: CPT | Performed by: NEUROLOGICAL SURGERY

## 2024-06-12 PROCEDURE — 1126F AMNT PAIN NOTED NONE PRSNT: CPT | Performed by: NEUROLOGICAL SURGERY

## 2024-06-12 PROCEDURE — 1160F RVW MEDS BY RX/DR IN RCRD: CPT | Performed by: NEUROLOGICAL SURGERY

## 2024-06-12 PROCEDURE — 3075F SYST BP GE 130 - 139MM HG: CPT | Performed by: NEUROLOGICAL SURGERY

## 2024-06-12 PROCEDURE — 3008F BODY MASS INDEX DOCD: CPT | Performed by: NEUROLOGICAL SURGERY

## 2024-06-12 PROCEDURE — 99214 OFFICE O/P EST MOD 30 MIN: CPT | Performed by: NEUROLOGICAL SURGERY

## 2024-06-12 NOTE — TELEPHONE ENCOUNTER
Patient's daughter, Barbi, is calling to ask if the [facility] parking lot area has wheelchair access from lot and wheelchairs available for patient scheduled to see Dr. Rodriguez on 6/13/2024.    Attempted to call site, no answer.

## 2024-06-12 NOTE — PROGRESS NOTES
Neurosurgery Clinic Visit  2024    Margaret Silverio PCP:  Johnathon Rodriguez DO    3/14/1945 MRN [id number]     HISTORY OF PRESENT ILLNESS:  Margaret Silverio is a(n) 79 year old female presents today in hospital follow-up.  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    Since discharge, she reports severe shortness of breath.  Somehow, she stopped taking her diuretic.  She has put on 15 pounds over the past 5 days.  She resumed the medication yesterday.  She is in touch with Dr. Rodriguez, her primary care physician, about this.    She complains of ongoing severe neck pain.  This is slightly better with the collar.  Some days, the collar provides good relief, and other days it is uncomfortable.    She has a history of atrial fibrillation.  Watchman procedure is pending.      PHYSICAL EXAMINATION:  Vital Signs:  /83   Pulse 62   Ht 65\"   BMI 20.07 kg/m²   On examination, strength is 5/5 throughout.  She has severe deformities in both hands due to rheumatoid arthritis.  Reflexes are 2+ and symmetric.  No Elias's or clonus.      REVIEW OF STUDIES:    MRI of the cervical spine was reviewed.  This demonstrates multilevel upper cervical spondylosis with stenosis, and possible low-grade cord compression.    Flexion-extension x-rays were reviewed once again as well.  These demonstrate instability at the C3-4 level.      ASSESSMENT and PLAN:  1.  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  Rheumatoid arthritis    3.  Multilevel cervical spondylosis with spinal stenosis and borderline cord compression    4.  Acute on chronic renal insufficiency    5.  Congestive heart failure with EF 15%    I had a lengthy conversation with the patient and her family regarding her current situation, as well as available treatment options.  I am concerned that the patient's existing comorbidities lead to an unacceptably high perioperative risk of morbidity and mortality.  The patient  reports that she would not want to consider surgery under any circumstance.    Therefore, I think a more palliative approach is appropriate.  She does have cervical instability.  However, she does not have evidence of repetitive cord trauma.  The hard cervical collar probably provides some protection against spinal cord injury, in the event of trauma.  To the extent that it helps her symptoms, I have encouraged her to wear it.  If it is uncomfortable, she does not necessarily need to.    She should follow-up with Dr. Lee, to discuss the possibility of injections.    I have not set a specific date for follow-up, but would be happy to speak with her or her other physicians if any questions come up.        Time spent on counseling/coordination of care:  30 Minutes    Total time spent with patient:  15 Minutes        6/12/2024  10:15 AM     This report was dictated using voice recognition technology.  Errors in syntax or recognition may occur, and should not be construed to change the meaning of a sentence.

## 2024-06-12 NOTE — PROGRESS NOTES
Most recent imaging dated on: 5/30/24 - XR Cervical Spine and 6/3/24 - MRI Spine Cervical.   Pain Level: 0/10.   Numbness / Tingling: Patient denies numbness and tingling.   Physical Therapy / Injections: Patient denies completing any recent Physical Therapy / Injections.

## 2024-06-12 NOTE — PATIENT INSTRUCTIONS
Refill policies:    Allow 2-3 business days for refills; controlled substances may take longer.  Contact your pharmacy at least 5 days prior to running out of medication and have them send an electronic request or submit request through the “request refill” option in your [employer] account.  Refills are not addressed on weekends; covering physicians do not authorize routine medications on weekends.  No narcotics or controlled substances are refilled after noon on Fridays or by on call physicians.  By law, narcotics must be electronically prescribed.  A 30 day supply with no refills is the maximum allowed.  If your prescription is due for a refill, you may be due for a follow up appointment.  To best provide you care, patients receiving routine medications need to be seen at least once a year.  Patients receiving narcotic/controlled substance medications need to be seen at least once every 3 months.  In the event that your preferred pharmacy does not have the requested medication in stock (e.g. Backordered), it is your responsibility to find another pharmacy that has the requested medication available.  We will gladly send a new prescription to that pharmacy at your request.    Scheduling Tests:    If your physician has ordered radiology tests such as MRI or CT scans, please contact Central Scheduling at [phone] right away to schedule the test.  Once scheduled, the [facility] Centralized Referral Team will work with your insurance carrier to obtain pre-certification or prior authorization.  Depending on your insurance carrier, approval may take 3-10 days.  It is highly recommended patients assure they have received an authorization before having a test performed.  If test is done without insurance authorization, patient may be responsible for the entire amount billed.      Precertification and Prior Authorizations:  If your physician has recommended that you have a procedure or additional testing performed the [facility]  Centralized Referral Team will contact your insurance carrier to obtain pre-certification or prior authorization.    You are strongly encouraged to contact your insurance carrier to verify that your procedure/test has been approved and is a COVERED benefit.  Although the [facility] Centralized Referral Team does its due diligence, the insurance carrier gives the disclaimer that \"Although the procedure is authorized, this does not guarantee payment.\"    Ultimately the patient is responsible for payment.   Thank you for your understanding in this matter.  Paperwork Completion:  If you require FMLA or disability paperwork for your recovery, please make sure to either drop it off or have it faxed to our office at [phone]. Be sure the form has your name and date of birth on it.  The form will be faxed to our Forms Department and they will complete it for you.  There is a 25$ fee for all forms that need to be filled out.  Please be aware there is a 10-14 day turnaround time.  You will need to sign a release of information (JENNIFER) form if your paperwork does not come with one.  You may call the Forms Department with any questions at [phone].  Their fax number is [phone].

## 2024-06-12 NOTE — TELEPHONE ENCOUNTER
Called patient daughter. Advised daughter that there is wheelchair accessible parking and access to the building.  Wheelchairs are available in office that caregiver can come get and take back out to the parking garage.

## 2024-06-13 ENCOUNTER — OFFICE VISIT (OUTPATIENT)
Dept: FAMILY MEDICINE CLINIC | Facility: CLINIC | Age: 79
End: 2024-06-13

## 2024-06-13 VITALS
RESPIRATION RATE: 18 BRPM | TEMPERATURE: 98 F | WEIGHT: 137 LBS | HEART RATE: 60 BPM | BODY MASS INDEX: 23 KG/M2 | DIASTOLIC BLOOD PRESSURE: 60 MMHG | SYSTOLIC BLOOD PRESSURE: 116 MMHG | OXYGEN SATURATION: 95 %

## 2024-06-13 DIAGNOSIS — R09.89 BILATERAL RALES: ICD-10-CM

## 2024-06-13 DIAGNOSIS — M43.12 ANTEROLISTHESIS OF CERVICAL SPINE: ICD-10-CM

## 2024-06-13 DIAGNOSIS — M05.79 RHEUMATOID ARTHRITIS INVOLVING MULTIPLE SITES WITH POSITIVE RHEUMATOID FACTOR (HCC): Primary | ICD-10-CM

## 2024-06-13 DIAGNOSIS — I10 ESSENTIAL (PRIMARY) HYPERTENSION: ICD-10-CM

## 2024-06-13 DIAGNOSIS — N18.32 STAGE 3B CHRONIC KIDNEY DISEASE (HCC): ICD-10-CM

## 2024-06-13 DIAGNOSIS — M51.36 LUMBAR DEGENERATIVE DISC DISEASE: ICD-10-CM

## 2024-06-13 DIAGNOSIS — Z09 HOSPITAL DISCHARGE FOLLOW-UP: ICD-10-CM

## 2024-06-13 PROCEDURE — 1159F MED LIST DOCD IN RCRD: CPT | Performed by: FAMILY MEDICINE

## 2024-06-13 PROCEDURE — 3078F DIAST BP <80 MM HG: CPT | Performed by: FAMILY MEDICINE

## 2024-06-13 PROCEDURE — 99214 OFFICE O/P EST MOD 30 MIN: CPT | Performed by: FAMILY MEDICINE

## 2024-06-13 PROCEDURE — 1125F AMNT PAIN NOTED PAIN PRSNT: CPT | Performed by: FAMILY MEDICINE

## 2024-06-13 PROCEDURE — 1111F DSCHRG MED/CURRENT MED MERGE: CPT | Performed by: FAMILY MEDICINE

## 2024-06-13 PROCEDURE — 3074F SYST BP LT 130 MM HG: CPT | Performed by: FAMILY MEDICINE

## 2024-06-13 RX ORDER — BUMETANIDE 1 MG/1
TABLET ORAL
COMMUNITY
Start: 2024-05-21

## 2024-06-13 NOTE — PATIENT INSTRUCTIONS
I have recommended that the patient at least have a trial of medicinal marijuana concerning her chronic pain.  This may also attend to any angst she may have just based on her chronic medical conditions and the thoughts and emotions that come behind.  Medication compliance and compliance with specialty care follow-up always encouraged.  Continue with diuretics daily.

## 2024-06-13 NOTE — PROGRESS NOTES
Subjective:     Patient ID: Margaret Silverio is a 79 year old female.    This patient is a well-established 79-year-old with extensive joint deformity/involvement secondary to rheumatoid arthritis who was admitted to [facility] on May 24, 2024 secondary to acute respiratory failure and acute kidney injury.  Patient's symptoms were progressively worsening exertional fatigue and shortness of breath.  Today the patient is accompanied by her  and one of her children.  From a clinical standpoint she says that she is improved since the hospitalization, but she still is limited with regards to activities of daily living.  When there is exertion, there is shortness of breath.  It is easier for her to gather herself if she is able to stop and take some deep breaths.    Patient is taking her medications including her daily diuretic.    Patient unfortunately experiences pain in multiple joints and also in muscle groups.        History/Other:   Review of Systems  Current Outpatient Medications   Medication Sig Dispense Refill    bumetanide 1 MG Oral Tab bumetanide 1 mg tablet, [RxNorm: 041779]      HYDROcodone-acetaminophen (NORCO)  MG Oral Tab Take 1 tablet by mouth every 6 (six) hours as needed for Pain. 60 tablet 0    alendronate 70 MG Oral Tab Take 1 tablet (70 mg total) by mouth once a week. 12 tablet 3    methotrexate 2.5 MG Oral Tab TAKE 6 TABLETS BY MOUTH 1 TIME A WEEK 77 tablet 0    gabapentin 300 MG Oral Cap Take 1 capsule (300 mg total) by mouth 2 (two) times daily. 60 capsule 0    lidocaine-menthol 4-1 % External Patch Place 1 patch onto the skin daily. 30 patch 0    isosorbide dinitrate 20 MG Oral Tab Take 1 tablet (20 mg total) by mouth TID (Nitrates). 90 tablet 0    mirtazapine 7.5 MG Oral Tab Take 1 tablet (7.5 mg total) by mouth nightly. 30 tablet 0    PANTOPRAZOLE 40 MG Oral Tab EC TAKE 1 TABLET(40 MG) BY MOUTH TWICE DAILY BEFORE MEALS 180 tablet 0    CARVEDILOL 3.125 MG Oral Tab Take 1  tablet (3.125 mg total) by mouth 2 (two) times daily with meals. 60 tablet 1    SACUBITRIL-VALSARTAN 49-51 MG Oral Tab Take 1 tablet by mouth 2 (two) times daily. 60 tablet 1    folic acid 800 MCG Oral Tab Take 1 tablet (800 mcg total) by mouth daily. 90 tablet 0    amiodarone 200 MG Oral Tab Take 1 tablet (200 mg total) by mouth daily.      ferrous sulfate 325 (65 FE) MG Oral Tab EC Take 1 tablet (325 mg total) by mouth daily with breakfast.       Allergies:  Allergies   Allergen Reactions    Lisinopril Coughing       Past Medical History:    Acute kidney insufficiency    Anemia    Arrhythmia    CHF (congestive heart failure) (HCC)    CKD (chronic kidney disease) stage 3, GFR 30-59 ml/min (HCC)    Deep vein thrombosis (HCC)    Essential hypertension    History of blood transfusion    Rheumatoid arthritis (HCC)    Seizure disorder (HCC)      Past Surgical History:   Procedure Laterality Date    Colonoscopy N/A 01/17/2024    Dr. Rios    Colonoscopy N/A 1/17/2024    Procedure: COLONOSCOPY;  Surgeon: Zoraida Rios MD;  Location: [facility] ENDOSCOPY    Egd N/A 01/17/2024    Dr. Rios    Other surgical history  2017    Heart Surgery     Other surgical history  2020    Bilateral shoulder replacement       No family history on file.   Social History:   Social History     Socioeconomic History    Marital status:    Tobacco Use    Smoking status: Former     Current packs/day: 0.00     Types: Cigarettes     Quit date: 2014     Years since quitting: 10.4    Smokeless tobacco: Never   Vaping Use    Vaping status: Never Used   Substance and Sexual Activity    Alcohol use: Never    Drug use: Never     Social Determinants of Health     Financial Resource Strain: Low Risk  (6/4/2024)    Financial Resource Strain     Difficulty of Paying Living Expenses: Not very hard     Med Affordability: No   Food Insecurity: No Food Insecurity (5/24/2024)    Food Insecurity     Food Insecurity: Never true   Transportation Needs: No Transportation  Needs (6/4/2024)    Transportation Needs     Lack of Transportation: No   Housing Stability: Low Risk  (5/24/2024)    Housing Stability     Housing Instability: No        Objective:   Vitals:    06/13/24 1037   BP: 116/60   Pulse: 60   Resp: 18   Temp: 98.1 °F (36.7 °C)       Physical Exam  Constitutional:       General: She is not in acute distress.     Appearance: She is ill-appearing.   HENT:      Head: Normocephalic.   Cardiovascular:      Rate and Rhythm: Normal rate and regular rhythm.      Heart sounds: Murmur heard.      No gallop.   Pulmonary:      Effort: No respiratory distress.      Breath sounds: No stridor. Rales present. No wheezing or rhonchi.      Comments: Bilateral basilar rales versus atelectasis.  Neurological:      Mental Status: She is alert and oriented to person, place, and time.         Assessment & Plan:   1. Hospital discharge follow-up  Status is improved, but remains symptomatic with exertion.    2. Bilateral rales  Bilateral basilar rales.  Perhaps retained fluid versus atelectasis.    3. Rheumatoid arthritis involving multiple sites with positive rheumatoid factor (HCC)  Status unchanged.  Advanced stage.  Pain management.  We will contact the [facility] for medicinal marijuana application.    4. Essential (primary) hypertension  Blood pressure measures to goal.  Compliance with medication encouraged.    5. Lumbar degenerative disc disease  Pain management.    6. Anterolisthesis of cervical spine  Status unchanged.  Source of pain.  Symptoms are current and active.    7. Stage 3b chronic kidney disease (HCC)  Recent status with a GFR of 39.  This number has been explained to the patient.      No orders of the defined types were placed in this encounter.      Meds This Visit:  Requested Prescriptions      No prescriptions requested or ordered in this encounter       Imaging & Referrals:  None     Patient Instructions   I have recommended that the patient at least have a trial of  medicinal marijuana concerning her chronic pain.  This may also attend to any angst she may have just based on her chronic medical conditions and the thoughts and emotions that come behind.  Medication compliance and compliance with specialty care follow-up always encouraged.  Continue with diuretics daily.    Return in about 3 months (around 9/13/2024), or if symptoms worsen or fail to improve.

## 2024-06-18 ENCOUNTER — HOSPITAL ENCOUNTER (INPATIENT)
Facility: HOSPITAL | Age: 79
LOS: 3 days | Discharge: HOME HEALTH | DRG: 291 | End: 2024-06-21
Attending: EMERGENCY MEDICINE | Admitting: HOSPITALIST

## 2024-06-18 ENCOUNTER — APPOINTMENT (OUTPATIENT)
Dept: GENERAL RADIOLOGY | Facility: HOSPITAL | Age: 79
DRG: 291 | End: 2024-06-18

## 2024-06-18 DIAGNOSIS — M06.9 RHEUMATOID ARTHRITIS OF HAND, UNSPECIFIED LATERALITY, UNSPECIFIED WHETHER RHEUMATOID FACTOR PRESENT (HCC): ICD-10-CM

## 2024-06-18 DIAGNOSIS — M51.36 LUMBAR DEGENERATIVE DISC DISEASE: ICD-10-CM

## 2024-06-18 DIAGNOSIS — M54.2 BILATERAL POSTERIOR NECK PAIN: ICD-10-CM

## 2024-06-18 DIAGNOSIS — I50.9 ACUTE ON CHRONIC CONGESTIVE HEART FAILURE, UNSPECIFIED HEART FAILURE TYPE (HCC): Primary | ICD-10-CM

## 2024-06-18 DIAGNOSIS — M05.79 RHEUMATOID ARTHRITIS INVOLVING MULTIPLE SITES WITH POSITIVE RHEUMATOID FACTOR (HCC): ICD-10-CM

## 2024-06-18 DIAGNOSIS — J90 PLEURAL EFFUSION, BILATERAL: ICD-10-CM

## 2024-06-18 PROBLEM — I50.23 ACUTE ON CHRONIC SYSTOLIC (CONGESTIVE) HEART FAILURE (HCC): Status: ACTIVE | Noted: 2024-06-18

## 2024-06-18 PROBLEM — I50.23 ACUTE ON CHRONIC SYSTOLIC (CONGESTIVE) HEART FAILURE (HCC): Status: ACTIVE | Noted: 2024-01-01

## 2024-06-18 LAB
ALBUMIN SERPL-MCNC: 4.1 G/DL (ref 3.2–4.8)
ALBUMIN/GLOB SERPL: 1.1 {RATIO} (ref 1–2)
ALP LIVER SERPL-CCNC: 328 U/L
ALT SERPL-CCNC: 11 U/L
ANION GAP SERPL CALC-SCNC: 8 MMOL/L (ref 0–18)
AST SERPL-CCNC: 23 U/L (ref ?–34)
ATRIAL RATE: 60 BPM
BASOPHILS # BLD AUTO: 0.03 X10(3) UL (ref 0–0.2)
BASOPHILS NFR BLD AUTO: 0.6 %
BILIRUB SERPL-MCNC: 0.8 MG/DL (ref 0.2–1.1)
BNP SERPL-MCNC: 2261 PG/ML
BUN BLD-MCNC: 45 MG/DL (ref 9–23)
BUN/CREAT SERPL: 25 (ref 10–20)
CALCIUM BLD-MCNC: 9.4 MG/DL (ref 8.7–10.4)
CHLORIDE SERPL-SCNC: 108 MMOL/L (ref 98–112)
CO2 SERPL-SCNC: 23 MMOL/L (ref 21–32)
CREAT BLD-MCNC: 1.8 MG/DL
DEPRECATED RDW RBC AUTO: 62.1 FL (ref 35.1–46.3)
EGFRCR SERPLBLD CKD-EPI 2021: 28 ML/MIN/1.73M2 (ref 60–?)
EOSINOPHIL # BLD AUTO: 0.4 X10(3) UL (ref 0–0.7)
EOSINOPHIL NFR BLD AUTO: 8.3 %
ERYTHROCYTE [DISTWIDTH] IN BLOOD BY AUTOMATED COUNT: 17.6 % (ref 11–15)
GLOBULIN PLAS-MCNC: 3.6 G/DL (ref 2–3.5)
GLUCOSE BLD-MCNC: 83 MG/DL (ref 70–99)
HCT VFR BLD AUTO: 30.4 %
HGB BLD-MCNC: 9.5 G/DL
IMM GRANULOCYTES # BLD AUTO: 0.02 X10(3) UL (ref 0–1)
IMM GRANULOCYTES NFR BLD: 0.4 %
LYMPHOCYTES # BLD AUTO: 0.54 X10(3) UL (ref 1–4)
LYMPHOCYTES NFR BLD AUTO: 11.2 %
MCH RBC QN AUTO: 31.5 PG (ref 26–34)
MCHC RBC AUTO-ENTMCNC: 31.3 G/DL (ref 31–37)
MCV RBC AUTO: 100.7 FL
MONOCYTES # BLD AUTO: 0.12 X10(3) UL (ref 0.1–1)
MONOCYTES NFR BLD AUTO: 2.5 %
NEUTROPHILS # BLD AUTO: 3.71 X10 (3) UL (ref 1.5–7.7)
NEUTROPHILS # BLD AUTO: 3.71 X10(3) UL (ref 1.5–7.7)
NEUTROPHILS NFR BLD AUTO: 77 %
OSMOLALITY SERPL CALC.SUM OF ELEC: 299 MOSM/KG (ref 275–295)
P-R INTERVAL: 158 MS
PLATELET # BLD AUTO: 78 10(3)UL (ref 150–450)
PLATELET MORPHOLOGY: NORMAL
PLATELETS.RETICULATED NFR BLD AUTO: 5.1 % (ref 0–7)
POTASSIUM SERPL-SCNC: 4.6 MMOL/L (ref 3.5–5.1)
PROT SERPL-MCNC: 7.7 G/DL (ref 5.7–8.2)
Q-T INTERVAL: 556 MS
QRS DURATION: 226 MS
QTC CALCULATION (BEZET): 556 MS
R AXIS: 256 DEGREES
RBC # BLD AUTO: 3.02 X10(6)UL
SODIUM SERPL-SCNC: 139 MMOL/L (ref 136–145)
T AXIS: 79 DEGREES
T4 FREE SERPL-MCNC: 1.4 NG/DL (ref 0.8–1.7)
TSI SER-ACNC: 7.53 MIU/ML (ref 0.55–4.78)
VENTRICULAR RATE: 60 BPM
WBC # BLD AUTO: 4.8 X10(3) UL (ref 4–11)

## 2024-06-18 PROCEDURE — 71045 X-RAY EXAM CHEST 1 VIEW: CPT | Performed by: EMERGENCY MEDICINE

## 2024-06-18 PROCEDURE — 99223 1ST HOSP IP/OBS HIGH 75: CPT | Performed by: HOSPITALIST

## 2024-06-18 RX ORDER — ISOSORBIDE DINITRATE 20 MG/1
20 TABLET ORAL
Status: DISCONTINUED | OUTPATIENT
Start: 2024-06-18 | End: 2024-06-21

## 2024-06-18 RX ORDER — ONDANSETRON 2 MG/ML
4 INJECTION INTRAMUSCULAR; INTRAVENOUS EVERY 6 HOURS PRN
Status: DISCONTINUED | OUTPATIENT
Start: 2024-06-18 | End: 2024-06-18

## 2024-06-18 RX ORDER — METOCLOPRAMIDE HYDROCHLORIDE 5 MG/ML
5 INJECTION INTRAMUSCULAR; INTRAVENOUS EVERY 8 HOURS PRN
Status: DISCONTINUED | OUTPATIENT
Start: 2024-06-18 | End: 2024-06-21

## 2024-06-18 RX ORDER — MIRTAZAPINE 7.5 MG/1
7.5 TABLET, FILM COATED ORAL NIGHTLY
Status: DISCONTINUED | OUTPATIENT
Start: 2024-06-18 | End: 2024-06-21

## 2024-06-18 RX ORDER — BUMETANIDE 0.25 MG/ML
1 INJECTION INTRAMUSCULAR; INTRAVENOUS ONCE
Status: COMPLETED | OUTPATIENT
Start: 2024-06-18 | End: 2024-06-18

## 2024-06-18 RX ORDER — HEPARIN SODIUM 5000 [USP'U]/ML
5000 INJECTION, SOLUTION INTRAVENOUS; SUBCUTANEOUS EVERY 12 HOURS SCHEDULED
Status: DISCONTINUED | OUTPATIENT
Start: 2024-06-18 | End: 2024-06-19

## 2024-06-18 RX ORDER — CARVEDILOL 3.12 MG/1
3.12 TABLET ORAL 2 TIMES DAILY WITH MEALS
Status: DISCONTINUED | OUTPATIENT
Start: 2024-06-18 | End: 2024-06-21

## 2024-06-18 RX ORDER — PANTOPRAZOLE SODIUM 40 MG/1
40 TABLET, DELAYED RELEASE ORAL
Status: DISCONTINUED | OUTPATIENT
Start: 2024-06-18 | End: 2024-06-21

## 2024-06-18 RX ORDER — HYDROCODONE BITARTRATE AND ACETAMINOPHEN 10; 325 MG/1; MG/1
1 TABLET ORAL EVERY 6 HOURS PRN
Status: DISCONTINUED | OUTPATIENT
Start: 2024-06-18 | End: 2024-06-21

## 2024-06-18 RX ORDER — FERROUS SULFATE 325(65) MG
325 TABLET, DELAYED RELEASE (ENTERIC COATED) ORAL
Status: DISCONTINUED | OUTPATIENT
Start: 2024-06-19 | End: 2024-06-21

## 2024-06-18 RX ORDER — AMIODARONE HYDROCHLORIDE 200 MG/1
200 TABLET ORAL DAILY
Status: DISCONTINUED | OUTPATIENT
Start: 2024-06-18 | End: 2024-06-21

## 2024-06-18 RX ORDER — GABAPENTIN 300 MG/1
300 CAPSULE ORAL 2 TIMES DAILY
Status: DISCONTINUED | OUTPATIENT
Start: 2024-06-18 | End: 2024-06-21

## 2024-06-18 RX ORDER — MELATONIN
800 DAILY
Status: DISCONTINUED | OUTPATIENT
Start: 2024-06-18 | End: 2024-06-21

## 2024-06-18 RX ORDER — BUMETANIDE 0.25 MG/ML
2 INJECTION INTRAMUSCULAR; INTRAVENOUS
Status: DISCONTINUED | OUTPATIENT
Start: 2024-06-18 | End: 2024-06-21

## 2024-06-18 RX ORDER — ACETAMINOPHEN 500 MG
500 TABLET ORAL EVERY 4 HOURS PRN
Status: DISCONTINUED | OUTPATIENT
Start: 2024-06-18 | End: 2024-06-21

## 2024-06-18 NOTE — ED INITIAL ASSESSMENT (HPI)
Pt to ED A&O x 4 for c/o ANSELMO and increased B/L LE & UE edema x 2 weeks.  Pt denies any chest pain.  Pt in NAD, speaking full, complete sentences w/ out any apparent difficulty/distress.  Pt w/ hx CHF, compliant w/ Bumex.  Pt was sent by Cardiology for further eval.

## 2024-06-18 NOTE — ED QUICK NOTES
Assumed car of patient.  Rounding Completed    Plan of Care reviewed. Waiting for RN assignment.  Elimination needs assessed.  Provided repositioning, assistance with meal set up.    Bed is locked and in lowest position. Call light within reach.

## 2024-06-18 NOTE — SPIRITUAL CARE NOTE
Spiritual Care Visit Note    Patient Name: Margaret Silverio Date of Spiritual Care Visit: 24   : 3/14/1945 Primary Dx: Acute on chronic congestive heart failure, unspecified heart failure type (HCC)       Referred By: Referral From: Care Coordination    Spiritual Care Taxonomy:    Intended Effects: Aligning care plan with patient's values    Methods: Offer spiritual/Anglican support    Interventions: Active listening;Ask guided questions;Assist someone with Advance Directives;[place];Facilitate communication between patient and/or family member and care team;Facilitate communication    Visit Type/Summary:     - Spiritual Care: Offered empathic listening and emotional support. Patient and family expressed appreciation for  visit. Provided support for Patient's spiritual/Anglican requests. Offered prayer.  - PoA: Identified Existing PoAH - No Updates Needed: Visited patient in response to POA for Healthcare consult/request. Prior to visit, reviewed the patient's EHR and paper chart to identify any existing PoAH documentation. A previously completed PoAH document was located. The document was reviewed with the patient during the visit. Patient states that the current document is accurate. No changes or updates are required. Confirmed that the PoAH primary agent name and contact information have been entered into the Epic, Advance Directives section. A copy of the existing document was printed and given to the patient. A copy of the document has been placed on the patient's chart.                                                                                                                                                                                                                                                                                                                                                           visited pt per [facility] consult for POAHC counseling. Electronic Chart note   stated pt has no POA but under Advanced Directives section in EPIC there are multiple POA's scanned into chart. *THE MOST RECENT SCANNED DOCUMENT LABELED POWER OF  scanned on 3/13/2024 IS A POLST FORM. The link to the POWER of  scanned on 2/19 is a Power of  of [facility]. I printed both documents and confirmed with the patient that the POA scanned on 2/19 is correct and valid. I made a copy and placed in paper chart and gave copy to the pt. I reviewed to confirm the POSLT form scanned on 3/13 with the pt due to the clerical error and she stated she may want to make some changes but did not want to revoke the document. I made copies and placed one in pt paper chart and gave the other to the pt. I spoke with the pt RN over the phone, communicated that the POLST form is mislabeled in EPIC, the POA scanned on 2/19 is correct, and that the pt was requesting a consult with an MD to discuss changing the POLST. She stated she would place consult to discuss POLST with pt. I entered Agent name and contact in [facility]. I will consult my manager via email tonight asking for help and instructions how to contact records in order to correct the mislabeled POLST because that correction can only be done by the records department. That issue will begin being addressed during business hours on Wednesday 6/19. I prayed for the patient to end the visit.   remains available for follow up.  Spiritual Care support can be requested via an [facility] consult. For urgent/immediate needs, please contact the On Call  at: [place]: ext [phone]    Rev. Eleno Marie MDiv

## 2024-06-18 NOTE — CONSULTS
Cardiology Consult Note    Margaret Silverio Patient Status:  Inpatient    3/14/1945 MRN [id number]   Location [facility] 3W/SW Attending Gagan Mejia MD   Hosp Day # 0 PCP Johnathon Rodriguez,      79-year-old female here for shortness of breath, was recently discharged 3 weeks ago shortness of breath, was feeling fine for 1 week and then recurred volume overload and shortness of breath.  Cardiology consulted for concern for heart failure given her history of nonischemic cardiomyopathy and severe valvular disease.      ED work up  EKG a paced, RV paced  Creatinine 1.8 increased from 1.4 on discharge  BNP 2200, chest x-ray with pulmonary edema  Hemoglobin stable 9.5 from baseline  Thrombocytopenia platelets 78 from baseline of 300      Assessment:    HFrEF: Declining ejection fraction over the last 6 months with recurrent heart failure admissions.  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  Volume overload and recurrent admissions in part due to RV failure from severe tricuspid regurgitation.  Severe tricuspid regurgitation: Wide-open TR based on echocardiogram 2024, RV pressure underestimated.  In part due to pacemaker lead.   History of GI bleed, upper and lower GI 2024 without significant findings  Atrial fibrillation not on anticoagulation due to GI bleed history      Plan:  Echocardiogram  Bumex IV 2 mg twice daily  Resume home heart failure meds  Will Dr. Sargent evaluated for BiV upgrade      Level of care: C5    Twin Boles MD  Interventional Cardiology  [facility]    --------------------------------------------------------------------------------------------------------------------------------  ROS 10 systems reviewed, pertinent findings above.  ROS    History:  Past Medical History:    Acute kidney insufficiency    Anemia    Arrhythmia    CHF (congestive heart failure) (HCC)    CKD (chronic kidney disease) stage 3, GFR 30-59  ml/min (HCC)    Deep vein thrombosis (HCC)    Essential hypertension    History of blood transfusion    Rheumatoid arthritis (HCC)    Seizure disorder (HCC)     Past Surgical History:   Procedure Laterality Date    Colonoscopy N/A 01/17/2024    Dr. Rios    Colonoscopy N/A 1/17/2024    Procedure: COLONOSCOPY;  Surgeon: Zoraida Rios MD;  Location: [facility] ENDOSCOPY    Egd N/A 01/17/2024    Dr. Rios    Other surgical history  2017    Heart Surgery     Other surgical history  2020    Bilateral shoulder replacement      No family history on file.   reports that she quit smoking about 10 years ago. Her smoking use included cigarettes. She has never used smokeless tobacco. She reports that she does not drink alcohol and does not use drugs.    Objective:   Temp: 98.2 °F (36.8 °C)  Pulse: 60  Resp: 20  BP: 128/67    Intake/Output:     Intake/Output Summary (Last 24 hours) at 6/18/2024 1645  Last data filed at 6/18/2024 1643  Gross per 24 hour   Intake 10 ml   Output 0 ml   Net 10 ml       Physical Exam:     General: NAD  HEENT: Normocephalic, anicteric sclera, neck supple.  Neck: No JVD, carotids 2+, no bruits.  Cardiac: Regular rate and rhythm. S1, S2 normal. No murmur, pericardial rub, S3.  Lungs: Clear without wheezes, rales, rhonchi or dullness.  Normal excursions and effort.  Abdomen: Soft, non-tender. BS-present.  Extremities: Without clubbing, cyanosis or edema.  Peripheral pulses are 2+.  Neurologic: Non-focal  Skin: Warm and dry.       Twin Boles MD  6/18/2024  4:45 PM

## 2024-06-18 NOTE — ED QUICK NOTES
Orders for admission, patient is aware of plan and ready to go upstairs. Any questions, please call ED RN Quiana at extension [phone].     Patient Covid vaccination status: Fully vaccinated     COVID Test Ordered in ED: None    COVID Suspicion at Admission: N/A    Running Infusions:  None    Mental Status/LOC at time of transport: A&Ox4    Other pertinent information: Incontinent, max assist with transfers    CIWA score: N/A   NIH score:  N/A

## 2024-06-18 NOTE — ED PROVIDER NOTES
Patient Seen in: [facility] Emergency Department      History     Chief Complaint   Patient presents with    Difficulty Breathing     Stated Complaint: ANSELMO    Subjective:   HPI    79-year-old female presents for evaluation from CHF clinic for difficulty breathing, lower extremity swelling, and weight gain.   reports that she has been on about 10 pounds of water weight in the past few days.  She states that she has been feeling short of breath especially with exertion.  She denies any chest pain, fevers, chills, nausea, vomiting.  She reports compliance with her Bumex.    Objective:   Past Medical History:    Acute kidney insufficiency    Anemia    Arrhythmia    CHF (congestive heart failure) (HCC)    CKD (chronic kidney disease) stage 3, GFR 30-59 ml/min (HCC)    Deep vein thrombosis (HCC)    Essential hypertension    History of blood transfusion    Rheumatoid arthritis (HCC)    Seizure disorder (HCC)              Past Surgical History:   Procedure Laterality Date    Colonoscopy N/A 01/17/2024    Dr. Rios    Colonoscopy N/A 1/17/2024    Procedure: COLONOSCOPY;  Surgeon: Zoraida Rios MD;  Location: [facility] ENDOSCOPY    Egd N/A 01/17/2024    Dr. Rios    Other surgical history  2017    Heart Surgery     Other surgical history  2020    Bilateral shoulder replacement                 Social History     Socioeconomic History    Marital status:    Tobacco Use    Smoking status: Former     Current packs/day: 0.00     Types: Cigarettes     Quit date: 2014     Years since quitting: 10.4    Smokeless tobacco: Never   Vaping Use    Vaping status: Never Used   Substance and Sexual Activity    Alcohol use: Never    Drug use: Never     Social Determinants of Health     Financial Resource Strain: Low Risk  (6/4/2024)    Financial Resource Strain     Difficulty of Paying Living Expenses: Not very hard     Med Affordability: No   Food Insecurity: No Food Insecurity (5/24/2024)    Food Insecurity     Food Insecurity:  Never true   Transportation Needs: No Transportation Needs (2024)    Transportation Needs     Lack of Transportation: No   Housing Stability: Low Risk  (2024)    Housing Stability     Housing Instability: No              Review of Systems    Positive for stated complaint: ANSELMO  Other systems are as noted in HPI.  Constitutional and vital signs reviewed.      All other systems reviewed and negative except as noted above.    Physical Exam     ED Triage Vitals [24 1242]   /65   Pulse 60   Resp 24   Temp 98 °F (36.7 °C)   Temp src Temporal   SpO2 100 %   O2 Device None (Room air)       Current Vitals:   Vital Signs  BP: 132/74  Pulse: 59  Resp: 16  Temp: 98 °F (36.7 °C)  Temp src: Temporal  MAP (mmHg): 91    Oxygen Therapy  SpO2: 91 %  O2 Device: None (Room air)            Physical Exam  Vitals and nursing note reviewed.   Constitutional:       Appearance: Normal appearance.   HENT:      Head: Normocephalic and atraumatic.   Cardiovascular:      Rate and Rhythm: Normal rate and regular rhythm.      Pulses: Normal pulses.           Radial pulses are 2+ on the right side and 2+ on the left side.      Heart sounds: Murmur heard.   Pulmonary:      Effort: Pulmonary effort is normal. Tachypnea present.      Breath sounds: Rales present.   Musculoskeletal:         General: Normal range of motion.      Cervical back: Normal range of motion.      Right lower le+ Pitting Edema present.      Left lower le+ Pitting Edema present.   Skin:     General: Skin is warm and dry.   Neurological:      General: No focal deficit present.      Mental Status: She is alert.               ED Course     Labs Reviewed   COMP METABOLIC PANEL (14) - Abnormal; Notable for the following components:       Result Value    BUN 45 (*)     Creatinine 1.80 (*)     BUN/CREA Ratio 25.0 (*)     Calculated Osmolality 299 (*)     eGFR-Cr 28 (*)     Alkaline Phosphatase 328 (*)     Globulin  3.6 (*)     All other components within  normal limits   BNP (B TYPE NATRIURETIC PEPTIDE) - Abnormal; Notable for the following components:    Beta Natriuretic Peptide 2,261 (*)     All other components within normal limits   RBC MORPHOLOGY SCAN - Abnormal; Notable for the following components:    RBC Morphology See morphology below (*)     All other components within normal limits   CBC W/ DIFFERENTIAL - Abnormal; Notable for the following components:    RBC 3.02 (*)     HGB 9.5 (*)     HCT 30.4 (*)     .7 (*)     RDW-SD 62.1 (*)     RDW 17.6 (*)     PLT 78.0 (*)     Lymphocyte Absolute 0.54 (*)     All other components within normal limits   CBC WITH DIFFERENTIAL WITH PLATELET    Narrative:     The following orders were created for panel order CBC With Differential With Platelet.  Procedure                               Abnormality         Status                     ---------                               -----------         ------                     CBC W/ DIFFERENTIAL[[id number]]          Abnormal            Final result                 Please view results for these tests on the individual orders.   SCAN SLIDE   RAINBOW DRAW LAVENDER   RAINBOW DRAW LIGHT GREEN   RAINBOW DRAW BLUE     EKG    Rate, intervals and axes as noted on EKG Report.  Rate: 60  Rhythm: av paced  Reading: av paced, no stemi, interpreted by myself.               ED Course as of 06/18/24 1511  ------------------------------------------------------------  Time: 06/18 1346  Value: XR CHEST AP PORTABLE  (CPT=71045)  Comment: Per my independent interpretation, patient's CXR demonstrates cardiomegaly, no pneumothorax.                [facility]        Admission disposition: 6/18/2024  3:03 PM                                        Medical Decision Making  Differential diagnosis includes but is not limited to renal failure, congestive heart failure, electrolyte disturbance, pneumonia, etc.    Exam and imaging consistent with acute congestive heart failure exacerbation.  Chest x-ray with  pleural effusions and pulmonary edema.  Patient's BNP is elevated.  CBC and chemistry were otherwise unremarkable.  She was started on IV Bumex and admitted with cardiology on consult.    Rhythm Strip: Rate 60 AV paced The cardiac monitor was ordered secondary to the patient's congestive heart failure.     Complicating factors: The patient  has a past medical history of Acute kidney insufficiency (05/15/2024), Anemia, Arrhythmia, CHF (congestive heart failure) (HCC), CKD (chronic kidney disease) stage 3, GFR 30-59 ml/min (HCC), Deep vein thrombosis (HCC), Essential hypertension, History of blood transfusion, Rheumatoid arthritis (HCC), and Seizure disorder (HCC). and  has a past surgical history that includes other surgical history (2017); other surgical history (2020); egd (N/A, 01/17/2024); colonoscopy (N/A, 01/17/2024); and colonoscopy (N/A, 1/17/2024). that contribute to the medical complexity of this ED evaluation.     Medical Record Review: I personally reviewed available prior medical records for any recent pertinent discharge summaries, testing, and procedures, and reviewed those reports.        Problems Addressed:  Acute on chronic congestive heart failure, unspecified heart failure type (HCC): chronic illness or injury with severe exacerbation, progression, or side effects of treatment  Pleural effusion, bilateral: acute illness or injury with systemic symptoms    Amount and/or Complexity of Data Reviewed  Independent Historian: spouse     Details: Reports 10 pound weight gain  External Data Reviewed: ECG and notes.     Details: EKG from May 24, 2024 reviewed, EKG was AV paced with a rate of 70.    Echo from 2/10/2024 reviewed, EF is 15 to 20%.  Labs: ordered. Decision-making details documented in ED Course.  Radiology: ordered and independent interpretation performed. Decision-making details documented in ED Course.  ECG/medicine tests: ordered and independent interpretation performed. Decision-making  details documented in ED Course.  Discussion of management or test interpretation with external provider(s): Case discussed with Dr. Sahni who accepts admission, would like 2mg Bumex and nephrology consult as well as cards.  ZEKE CHRISTINAKYLE Romi accepts cards consult. Dr. Mejia with nephro is on consult.     Risk  Decision regarding hospitalization.        Disposition and Plan     Clinical Impression:  1. Acute on chronic congestive heart failure, unspecified heart failure type (HCC)    2. Pleural effusion, bilateral         Disposition:  Admit  6/18/2024  3:03 pm    Follow-up:  No follow-up provider specified.  We recommend that you schedule follow up care with a primary care provider within the next three months to obtain basic health screening including reassessment of your blood pressure.      Medications Prescribed:  Current Discharge Medication List                            Hospital Problems       Present on Admission  Date Reviewed: 6/13/2024            ICD-10-CM Noted POA    * (Principal) Acute on chronic congestive heart failure, unspecified heart failure type (HCC) I50.9 5/3/2023 Unknown

## 2024-06-18 NOTE — HISTORICAL OFFICE NOTE
[facility]  Outside Information  Continuity of Care Document  6/7/2024  Margaret Silverio - 79 y.o. Female; born [birth year]Summary of episode note, generated on Seth. 10, 2024June 10, 2024   CHIEF COMPLAINT    CHIEF COMPLAINT  Reason for Visit/Chief Complaint   hospital f/u   This is a patient of Dr. Boles with a history of nonischemic cardiomyopathy, heart failure reduced ejection fraction, severe tricuspid valve regurgitation, bioprosthetic mitral valve replacement, possible TIA, atrial flutter, dual chamber ICD, hypertension, hyperlipidemia, severe GI bleed and left atrial appendage occlusion. Patient has had an ejection fraction of 35 to 40% but recently was found to be decreased to 15 to 20%. She comes today after a second hospitalization a few days after her last office visit for progressive shortness of breath. BNP was quite elevated with moderate pulmonary edema on chest x-ray. She required BiPAP and there was difficulty with diuresis which eventually was accomplished with milrinone and IV diuretics. Nephrology was on board for her chronic kidney disease with baseline creatinine 1.4-1.6. She did receive 1 unit of packed RBCs for anemia which was present on her prior hospitalization. Patient was discharged on 6/3/2024. She reports that she has gained 2 pounds over the last 4 days. Per the hospital note she was to be discharged on Bumex 1 mg daily but she did not receive the prescription and it is not on her discharge summary. She has shortness of breath with exertion but not at rest. She states she develops edema over the course of the day but it resolves overnight. Her home weight was 123 pounds and now has increased to 125 pounds. She denies any significant dizziness or lightheadedness.The hospitalization prior to this one was for weakness and hypotension with systolic blood pressure in the 80s at home. She was found to have recurrent anemia with hemoglobin 8.4 as well as  acute on chronic kidney disease with creatinine 2.28. Medications were held and she was gently hydrated. Nephrology was consulted as well as cardiology, her creatinine improved to 1.48 on discharge. Her hypotension was felt to be a combination of polypharmacy, poor appetite and dehydration. Her hemoglobin/ hematocrit decrease was felt to be delusional and she was to establish care with a hematologist to receive Epogen.     PROBLEMS  Reconcile with Patient's ChartPROBLEMS  Problem Effective Dates Date resolved Problem Status   ACC/AHA stage B systolic heart failure, [SNOMED-CT: 702917156515115] 7/27/2022 - Active   Nonischemic cardiomyopathy, [SNOMED-CT: 28141028] 5/21/2024 - Active   Anemia, unspecified, [SNOMED-CT: 773493669] 5/21/2024 - Active   Anorexia, [SNOMED-CT: 18812596] 5/21/2024 - Active   Chronic kidney insufficiency, stage 3 (moderate), [SNOMED-CT: 500974962] 5/21/2024 - Active   H/O hypotension, [SNOMED-CT: 446685746] 5/21/2024 - Active   Severe tricuspid regurgitation, [SNOMED-CT: 622670014] 5/21/2024 - Active   SOB (shortness of breath), [SNOMED-CT: 582302570] 4/11/2024 - Active   On amiodarone therapy, [SNOMED-CT: 379199814] 1/29/2024 - Active   Automatic implantable cardiac defibrillator (S/P AICD), [SNOMED-CT: 773904635] 1/29/2024 - Active   Atrial fibrillation (Afib), paroxysmal - A Fib, [SNOMED-CT: 361489081] 1/29/2024 - Active   Claudication, [SNOMED-CT: 50039669] 7/27/2022 - Active   History of mitral valve replacement (MVR) - Hx, [SNOMED-CT: 8723412822561] 6/22/2022 - Active   Benign essential HTN, [SNOMED-CT: 4950432] 6/22/2022 - Active   Acute kidney injury (HCC), [SNOMED-CT: 99732905] 5/21/2024 - Active     ENCOUNTER DIAGNOSIS    ENCOUNTER DIAGNOSIS  Problem Effective Dates Date resolved Problem Status   ACC/AHA stage B systolic heart failure, [SNOMED-CT: 238662000223669] 7/27/2022 - Active   Nonischemic cardiomyopathy, [SNOMED-CT: 16194625] 5/21/2024 - Active   Anemia, unspecified,  [SNOMED-CT: 170215693] 5/21/2024 - Active   Chronic kidney insufficiency, stage 3 (moderate), [SNOMED-CT: 191649530] 5/21/2024 - Active   H/O hypotension, [SNOMED-CT: 652452481] 5/21/2024 - Active   Severe tricuspid regurgitation, [SNOMED-CT: 962675338] 5/21/2024 - Active   On amiodarone therapy, [SNOMED-CT: 469127965] 1/29/2024 - Active   Atrial fibrillation (Afib), paroxysmal - A Fib, [SNOMED-CT: 236238240] 1/29/2024 - Active   History of mitral valve replacement (MVR) - Hx, [SNOMED-CT: 2556203350311] 6/22/2022 - Active   Benign essential HTN, [SNOMED-CT: 3784305] 6/22/2022 - Active     VITAL SIGNS    VITAL SIGNS  Date / Time: 6/7/2024   BP Systolic 120 mmHg   BP Diastolic 54 mmHg   Height 64 inches   Weight 125 lbs   Pulse Rate 60 bpm   BSA (Body Surface Area) 1.6 cc/m2   BMI (Body Mass Index) 21.5 cc/m2   Blood Pressure 120 / 54 mmHg     PHYSICAL EXAMINATION    PHYSICAL EXAMINATION  Header Details   Constitutional 97%o2sat   Vitals Left Arm Sitting  / 54 mmHg, Pulse rate 60 bpm, Regular, Height in 5' 4\", BMI: 21.5, Weight in 125.66 lbs (or) 57 kgs, BSA : 1.6 cc/m²   General Appearance No Acute Distress   Respiratory Lungs clear with normal breath sounds   Cardiovascular Regular rhythm. Normal rate present. Normal and normal S1 and S2   Lower Extremities No edema     ALLERGIES, ADVERSE REACTIONS, ALERTS    ALLERGIES, ADVERSE REACTIONS, ALERTS  Type Substance Reaction Severity Status   Allergies lisinopril - Ingredient, [RxNorm: 07076] cough Mild Active     MEDICATIONS ADMINISTERED DURING VISIT    No data available    MEDICATIONS    MEDICATIONS  Medication Start Date Route/Frequency Status   alendronate 70 MG Oral Tab, [RxNorm: 703788] 5/21/2024 Take 1 tablet (70 mg total) by mouth once a week. Active   amiodarone 200 MG Oral Tab, [RxNorm: 798306] 5/21/2024 Take 1 tablet (200 mg total) by mouth daily. Active   bumetanide 1 mg tablet, [RxNorm: 188651] 5/21/2024 HOLD Take 1 tablet orally once a day. Active    carvediloL 3.125 mg tablet, [RxNorm: 816864] 5/21/2024 Take 1 tablet orally 2 times a day. Active   Entresto 49 mg-51 mg tablet, [RxNorm: 0278772] 5/21/2024 Take 1 tablet orally 2 times a day. Active   ferrous sulfate 325 (65 FE) MG Oral Tab EC, [RxNorm: 394681] 5/21/2024 Take 1 tablet (325 mg total) by mouth daily with breakfast. Active   folic acid 800 MCG Oral Tab, [RxNorm: 986308] 5/21/2024 Take 1 tablet (800 mcg total) by mouth daily. Active   gabapentin 300 mg capsule, [RxNorm: 151056] 6/5/2024 Take 1 capsule orally 2 times a day. Active   HYDROcodone-acetaminophen (NORCO)  MG Oral Tab, [RxNorm: 925500] 5/21/2024 Take 1 tablet by mouth every 6 (six) hours as needed for Pain. Active   isosorbide dinitrate 20 mg tablet, [RxNorm: 670522] 6/5/2024 Take 1 tablet orally 3 times a day. Active   lidocaine 4 %-menthoL 1 % topical patch, [RxNorm: 9400248] 6/7/2024 (topical) once a day. Active   methotrexate 2.5 MG Oral Tab, [RxNorm: 863935] 5/21/2024 TAKE 6 TABLETS BY MOUTH 1 TIME A WEEK Active   mirtazapine 7.5 MG Oral Tab, [RxNorm: 700550] 5/21/2024 Take 1 tablet (7.5 mg total) by mouth nightly. Active   PANTOPRAZOLE 40 MG Oral Tab EC, [RxNorm: 953689] 5/21/2024 TAKE 1 TABLET(40 MG) BY MOUTH TWICE DAILY BEFORE MEALS Active   spironolactone 25 mg tablet, [RxNorm: 518839] 5/21/2024 HOLD Take 1 tablet orally once a day. Active   bumetanide 1 mg tablet, [RxNorm: 748880] 6/7/2024 Take 1 tablet orally once a day. Active     ASSESSMENT    1. Heart failure reduced ejection fraction-appears compensated on exam today. Hospital records reviewed and she was to be discharged on bumetanide 1 mg daily per nephrology. She did not receive a prescription and we will send that today. Home weights have increased 2 pounds since hospital discharge on 6/3/2024. Lungs sound clear and abdomen is soft. She will continue Entresto and carvedilol at the current doses.  2. Tricuspid valve regurgitation-severe, continue optimal blood  pressure control including isosorbide dinitrate and diuretics.  3. Hypertension-blood pressure controlled on current management.  4. Nonischemic cardiomyopathy-continue guideline directed medical therapy.Plan:  Send a prescription for bumetanide 1 mg, directions 1 tablet daily #90 with 2 refills  Continue other medications as prescribed  Continue weighing yourself every morning and call for 3 pound weight gain overnight  Follow-up Dr. Boles on 6/10/2024 as scheduled     FAMILY HISTORY    FAMILY HISTORY  Relationship Age Diagnosis   Father 0 No history of Family history of heart disease   Mother 0 No history of Family history of heart disease     GENERAL STATUS    No data available    PAST MEDICAL HISTORY    PAST MEDICAL HISTORY  Problem Date diagonsed Date resolved Status   ACC/AHA stage B systolic heart failure, [SNOMED-CT: 161694643715203] 7/27/2022 - Active   Nonischemic cardiomyopathy, [SNOMED-CT: 98559500] 5/21/2024 - Active   Anemia, unspecified, [SNOMED-CT: 478981507] 5/21/2024 - Active   Anorexia, [SNOMED-CT: 65166561] 5/21/2024 - Active   Chronic kidney insufficiency, stage 3 (moderate), [SNOMED-CT: 612396443] 5/21/2024 - Active   H/O hypotension, [SNOMED-CT: 908911024] 5/21/2024 - Active   Severe tricuspid regurgitation, [SNOMED-CT: 880048386] 5/21/2024 - Active   SOB (shortness of breath), [SNOMED-CT: 961155238] 4/11/2024 - Active   On amiodarone therapy, [SNOMED-CT: 965470596] 1/29/2024 - Active   Atrial fibrillation (Afib), paroxysmal - A Fib, [SNOMED-CT: 796396333] 1/29/2024 - Active   Claudication, [SNOMED-CT: 10278349] 7/27/2022 - Active   History of mitral valve replacement (MVR) - Hx, [SNOMED-CT: 4730577141569] 6/22/2022 - Active   Benign essential HTN, [SNOMED-CT: 4176271] 6/22/2022 - Active   Acute kidney injury (HCC), [SNOMED-CT: 60875395] 5/21/2024 - Active     HISTORY OF PRESENT ILLNESS    This is a patient of Dr. Boles with a history of nonischemic cardiomyopathy, heart failure reduced ejection  fraction, severe tricuspid valve regurgitation, bioprosthetic mitral valve replacement, possible TIA, atrial flutter, dual chamber ICD, hypertension, hyperlipidemia, severe GI bleed and left atrial appendage occlusion. Patient has had an ejection fraction of 35 to 40% but recently was found to be decreased to 15 to 20%. She comes today after a second hospitalization a few days after her last office visit for progressive shortness of breath. BNP was quite elevated with moderate pulmonary edema on chest x-ray. She required BiPAP and there was difficulty with diuresis which eventually was accomplished with milrinone and IV diuretics. Nephrology was on board for her chronic kidney disease with baseline creatinine 1.4-1.6. She did receive 1 unit of packed RBCs for anemia which was present on her prior hospitalization. Patient was discharged on 6/3/2024. She reports that she has gained 2 pounds over the last 4 days. Per the hospital note she was to be discharged on Bumex 1 mg daily but she did not receive the prescription and it is not on her discharge summary. She has shortness of breath with exertion but not at rest. She states she develops edema over the course of the day but it resolves overnight. Her home weight was 123 pounds and now has increased to 125 pounds. She denies any significant dizziness or lightheadedness.The hospitalization prior to this one was for weakness and hypotension with systolic blood pressure in the 80s at home. She was found to have recurrent anemia with hemoglobin 8.4 as well as acute on chronic kidney disease with creatinine 2.28. Medications were held and she was gently hydrated. Nephrology was consulted as well as cardiology, her creatinine improved to 1.48 on discharge. Her hypotension was felt to be a combination of polypharmacy, poor appetite and dehydration. Her hemoglobin/ hematocrit decrease was felt to be delusional and she was to establish care with a hematologist to receive  Epogen.     IMMUNIZATIONS  Reconcile with Patient's ChartNo data available    PLAN OF CARE    PLAN OF CARE  Planned Care Date   Take 1 tablet orally once a day.-bumetanide 1 mg tablet 6/7/2024   Referral Visit - Johnathon Rodriguez [email or web address]) : 1/1/1900     PROCEDURES    No data available    RESULTS    RESULTS  Name Result Date Location - Ordered By   POCT CREATININE [LOINC: 98704-9] 1.90 mg/dL [High] 04/11/2024 10:39:00 AM [facility] ([facility])  Address: [address]  [facility]  [address]  tel:   E GFR CR [LOINC: 75373-1] 27 mL/min/1.73m2 [Low] 04/11/2024 10:39:00 AM [facility] ([facility])  Address: [address]  [facility]  [address]  tel:   GLUCOSE [LOINC: 2339-0] 104 mg/dL [High] 09/25/2023 12:35:00 PM [facility] ([facility])  Address: [address]  [facility]  [address]  tel:   SODIUM [LOINC: 2951-2] 139 mmol/L 09/25/2023 12:35:00 PM [facility] ([facility])  Address: [address]  [facility]  [address]  tel:   POTASSIUM [LOINC: 2823-3] 4.0 mmol/L 09/25/2023 12:35:00 PM [facility] ([facility])  Address: [address]  [facility]  [address]  tel:   CHLORIDE [LOINC: 2075-0] 106 mmol/L 09/25/2023 12:35:00 PM [facility] ([facility])  Address: [address]  [facility]  [address]  tel:   CO2 [LOINC: 2028-9] 20.0 mmol/L [Low] 09/25/2023 12:35:00 PM [facility] ([facility])  Address: [address]  [facility]  [address]  tel:   ANION GAP [LOINC: 33037-3] 13 mmol/L 09/25/2023 12:35:00 PM [facility] ([facility])  Address: [address]  [facility]  [address]  tel:   BUN [LOINC: 6299-2] 18 mg/dL 09/25/2023 12:35:00 PM [facility] ([facility])  Address: [address]  [facility]  [address]  tel:   CREATININE [LOINC: 29684-2] 1.47 mg/dL [High]  09/25/2023 12:35:00 PM [facility] ([facility])  Address: [address]  [facility]  [address]  tel:   BUN/ CREAT RATIO [LOINC: 3097-3] 12.2 09/25/2023 12:35:00 PM [facility] ([facility])  Address: [address]  [facility]  [address]  tel:   CALCIUM [LOINC: 95644-1] 9.6 mg/dL 09/25/2023 12:35:00 PM [facility] ([facility])  Address: [address]  [facility]  [address]  tel:   OSMOLALITY CALCULATED [LOINC: 92535-4] 290 mOsm/kg 09/25/2023 12:35:00 PM [facility] ([facility])  Address: [address]  [facility]  [address]  tel:   E GFR CR [LOINC: 07968-7] 37 mL/min/1.73m2 [Low] 09/25/2023 12:35:00 PM [facility] ([facility])  Address: [address]  [facility]  [address]  tel:   FASTING PATIENT BMP ANSWER [LOINC: 39741-0] Yes 09/25/2023 12:35:00 PM [facility] ([facility])  Address: [address]  [facility]  [address]  tel:   MRSA SCREEN BY PCR [LOINC: 77456-9] Negative 09/25/2023 12:35:00 PM [facility] ([facility])  Address: [address]  [facility]  [address]  tel:   MORPHOLOGY [LOINC: 4047486] See morphology below [Abnormal] 09/25/2023 12:35:00 PM [facility] ([facility])  Address: [address]  [facility]  [address]  tel:   PLATELET MORPHOLOGY [LOINC: 1172974] Normal 09/25/2023 12:35:00 PM [facility] ([facility])  Address: [address]  [facility]  [address]  tel:   MACROCYTOSIS [LOINC: 738-5] 1+ 09/25/2023 12:35:00 PM [facility] ([facility])  Address: [address]  [facility]  [address]  tel:   POLYCHROMASIA [LOINC: 58377-1] 1+ 09/25/2023 12:35:00 PM [facility] ([facility])  Address: [address]  [facility]  [address]  tel:   OVALOCYTES [LOINC: 774-0] 1+ 09/25/2023 12:35:00 PM [facility]  ([facility])  Address: [address]  [facility]  [address]  tel:   WBC [LOINC: 6690-2] 3.8 x10(3) uL [Low] 09/25/2023 12:35:00 PM [facility] LAB ([facility])  Address: [address]  [facility]  [address]  tel:   RED BLOOD COUNT [LOINC: 789-8] 3.29 x10(6)uL [Low] 09/25/2023 12:35:00 PM [facility] ([facility])  Address: [address]  [facility]  [address]  tel:   HGB [LOINC: 718-7] 10.1 g/dL [Low] 09/25/2023 12:35:00 PM [facility] ([facility])  Address: [address]  [facility]  [address]  tel:   HCT [LOINC: 4544-3] 33.0 % [Low] 09/25/2023 12:35:00 PM [facility] ([facility])  Address: [address]  [facility]  [address]  tel:   MEAN CELL VOLUME [LOINC: 787-2] 100.3 fL [High] 09/25/2023 12:35:00 PM [facility] ([facility])  Address: [address]  [facility]  [address]  tel:   MEAN CORPUSCULAR HEMOGLOBIN [LOINC: 785-6] 30.7 pg 09/25/2023 12:35:00 PM [facility] ([facility])  Address: [address]  [facility]  [address]  tel:   MEAN CORPUSCULAR HGB CONC [LOINC: 786-4] 30.6 g/dL [Low] 09/25/2023 12:35:00 PM [facility] ([facility])  Address: [address]  [facility]  [address]  tel:   RED CELL DISTRIBUTION WIDTH-SD [LOINC: 05213-8] 67.4 fL [High] 09/25/2023 12:35:00 PM [facility] ([facility])  Address: [address]  [facility]  [address]  tel:   RED CELL DISTRIBUTION WIDTH CV [LOINC: 788-0] 19.2 % [High] 09/25/2023 12:35:00 PM [facility] ([facility])  Address: [address]  [facility]  [address]  tel:   PLATELETS [LOINC: 777-3] 190.0 10(3)uL 09/25/2023 12:35:00 PM [facility] ([facility])  Address: [address]  [facility]  [address]  tel:   NEUTROPHIL ABS PRELIM [LOINC: 751-8] 2.31 x10 (3) uL 09/25/2023 12:35:00 PM [facility] ([facility])  Address: [address]  [facility]  [address]  tel:   NEUTROPHIL ABSOLUTE [LOINC: 751-8] 2.31 x10(3) uL 09/25/2023 12:35:00 PM [facility] ([facility])  Address: [address]  [facility]  [address]  tel:   LYMPHOCYTE ABSOLUTE [LOINC: 731-0] 0.94 x10(3) uL [Low] 09/25/2023 12:35:00 PM [facility] ([facility])  Address: [address]  [facility]  [address]  tel:   MONOCYTE ABSOLUTE [LOINC: 742-7] 0.31 x10(3) uL 09/25/2023 12:35:00 PM [facility] ([facility])  Address: [address]  [facility]  [address]  tel:   EOSINOPHIL ABSOLUTE [LOINC: 711-2] 0.14 x10(3) uL 09/25/2023 12:35:00 PM [facility] ([facility])  Address: [address]  [place]  [place]  [address]  tel:   BASOPHIL ABSOLUTE [LOINC: 704-7] 0.05 x10(3) uL 09/25/2023 12:35:00 PM [facility] ([facility])  Address: [address]  [facility]  [address]  tel:   IMMATURE GRANULOCYTE COUNT [LOINC: 66465-8] 0.02 x10(3) uL 09/25/2023 12:35:00 PM [facility] ([facility])  Address: [address]  [place]  [place]  [address]  tel:   NEUTROPHIL % [LOINC: 770-8] 61.4 % 09/25/2023 12:35:00 PM [facility] ([facility])  Address: [address]  [place]  [place]  [address]  tel:   LYMPHOCYTE % [LOINC: 736-9] 24.9 % 09/25/2023 12:35:00 PM [facility] ([facility])  Address: [address]  [facility]  [address]  tel:   MONOCYTE % [LOINC: 5905-5] 8.2 % 09/25/2023 12:35:00 PM [facility] ([facility])  Address: [address]  [facility]  [address]  tel:   EOSINOPHIL % [LOINC: 713-8] 3.7 % 09/25/2023 12:35:00 PM [facility] ([facility])  Address: [address]  [facility]  [address]  tel:   BASOPHIL % [LOINC: 706-2] 1.3 % 09/25/2023 12:35:00 PM [facility] ([facility])  Address: [address]  [facility]  [address]  tel:   IMMATURE GRANULOCYTE RATIO % [LOINC: 12711-1] 0.5 % 09/25/2023 12:35:00 PM [facility] LAB ([facility])  Address: [address]  [facility]  [address]  tel:   PROTIME [LOINC: 5902-2] 18.9 seconds [High] 09/25/2023 12:35:00 PM [facility] LAB ([facility])  Address: [address]  [facility]  [address]  tel:   INR [LOINC: 21301-1] 1.61 [High] 09/25/2023 12:35:00 PM [facility] LAB ([facility])  Address: [address]  [facility]  [address]  tel:   Lower Extremity Arterial Ultrasound 1.The study quality is good. 2.Bilateral scattered plaque in the lower extremity arterial system causing less than 50% stenosis.3.Bilateral lower extremity arterial system demonstrates tri-phasic & bi-phasic waveforms.4.Left anterior tibial artery demonstrates low velocity flow.5.Three vessel runoff to the feet bilaterally. 9/22/2022 12:30:00 PM Blake Puckett MD   Trans Thoracic Echocardiogram 1.The left ventricle is normal in size. The left ventricular wall thickness is normal. Global left ventricular systolic function is moderately decreased. The left ventricular ejection fraction is 37%. Regional wall motion analysis shows dyskinesis of mid anteroseptal segment and mid inferoseptal segment. Wall motion score index is 4. Left ventricular diastolic function is indeterminate.2.Right ventricular systolic function is severely decreased. 3.The left atrial diameter is mildly increased. 4.The right atrium is mildly enlarged. 5.Severe (4+) tricuspid regurgitation. 6.A bio prosthetic valve is noted at the mitral location. The mean trans mitral gradient is 5.0 mmHg.7.The pulmonary artery systolic pressure is 40 mmHg. 9/1/2023 11:00:00 AM Luis Sargent MD     REVIEW OF SYSTEMS    REVIEW OF SYSTEMS  Header Details   Cardiovascular RMA, Edema  No history of Chest pain, Palpitations, Syncope, PND, Orthopnea, Claudication    Respiratory No history of SOB   Neurological No history of Numbness, Limb weakness     SOCIAL HISTORY    SOCIAL HISTORY  Social History Element Description Effective Dates   Smoking status Never smoked -     FUNCTIONAL STATUS    No data available    MEDICAL EQUIPMENT    No data available    Goals Sections    No data available    REASON FOR REFERRAL           Health Concerns Section    No data available    COGNITIVE/MENTAL STATUS    No data available    Patient Demographics    Patient Demographics  Patient Address Patient Name Communication   [address] [place] [address] Margaret Silverio [phone] (Mobile)     Patient Demographics  Language Race / Ethnicity Marital Status   English - Spoken (Preferred) Black or  / Not  or       Document Information    Primary Care Provider Other Service Providers Document Coverage Dates   Gail Wasserman  NPI: [id number]  [phone] (Work)  [address]  [place] [address]  [place] [address]  [employer]  [facility]  [phone] (Work)  [address] [place] [address] Sosa Sesay  NPI: [id number]  [phone] (Work)  [address], [address] [place] [address]  [place] [address]  Nurses     Suellen Tao  NPI: [id number]  [phone] (Work)  [address] [facility], [address] [place] [address]  [place] [address]  Nurses     Keshia White  NPI: [id number]  [phone] (Work)  [address] [facility], [address] [place] [address]  [place] [address]  Nurses Jun. 07, 2024June 07, 2024      Organization   [facility]  [phone] (Work)  [address], [address] [place] [address]  [place] [address]     Encounter Providers Encounter Date    Jun. 07, 2024June 07, 2024     Legal Authenticator    Gail Wasserman  NPI: [id number]  [phone] (Work)  [address], [address] [place] [address]  [place] [address]

## 2024-06-19 ENCOUNTER — APPOINTMENT (OUTPATIENT)
Dept: CV DIAGNOSTICS | Facility: HOSPITAL | Age: 79
DRG: 291 | End: 2024-06-19
Attending: NURSE PRACTITIONER

## 2024-06-19 LAB
ANION GAP SERPL CALC-SCNC: 9 MMOL/L (ref 0–18)
ANION GAP SERPL CALC-SCNC: 9 MMOL/L (ref 0–18)
BASOPHILS # BLD AUTO: 0.03 X10(3) UL (ref 0–0.2)
BASOPHILS # BLD AUTO: 0.04 X10(3) UL (ref 0–0.2)
BASOPHILS NFR BLD AUTO: 0.6 %
BASOPHILS NFR BLD AUTO: 0.7 %
BNP SERPL-MCNC: 3899 PG/ML
BUN BLD-MCNC: 40 MG/DL (ref 9–23)
BUN BLD-MCNC: 41 MG/DL (ref 9–23)
BUN/CREAT SERPL: 27.3 (ref 10–20)
BUN/CREAT SERPL: 27.8 (ref 10–20)
CALCIUM BLD-MCNC: 9.1 MG/DL (ref 8.7–10.4)
CALCIUM BLD-MCNC: 9.4 MG/DL (ref 8.7–10.4)
CHLORIDE SERPL-SCNC: 107 MMOL/L (ref 98–112)
CHLORIDE SERPL-SCNC: 108 MMOL/L (ref 98–112)
CO2 SERPL-SCNC: 24 MMOL/L (ref 21–32)
CO2 SERPL-SCNC: 24 MMOL/L (ref 21–32)
CREAT BLD-MCNC: 1.44 MG/DL
CREAT BLD-MCNC: 1.5 MG/DL
DEPRECATED RDW RBC AUTO: 59.4 FL (ref 35.1–46.3)
DEPRECATED RDW RBC AUTO: 60.3 FL (ref 35.1–46.3)
EGFRCR SERPLBLD CKD-EPI 2021: 35 ML/MIN/1.73M2 (ref 60–?)
EGFRCR SERPLBLD CKD-EPI 2021: 37 ML/MIN/1.73M2 (ref 60–?)
EOSINOPHIL # BLD AUTO: 0.43 X10(3) UL (ref 0–0.7)
EOSINOPHIL # BLD AUTO: 0.47 X10(3) UL (ref 0–0.7)
EOSINOPHIL NFR BLD AUTO: 8.2 %
EOSINOPHIL NFR BLD AUTO: 8.7 %
ERYTHROCYTE [DISTWIDTH] IN BLOOD BY AUTOMATED COUNT: 17.2 % (ref 11–15)
ERYTHROCYTE [DISTWIDTH] IN BLOOD BY AUTOMATED COUNT: 17.3 % (ref 11–15)
GLUCOSE BLD-MCNC: 84 MG/DL (ref 70–99)
GLUCOSE BLD-MCNC: 90 MG/DL (ref 70–99)
HCT VFR BLD AUTO: 30.2 %
HCT VFR BLD AUTO: 30.9 %
HGB BLD-MCNC: 9.5 G/DL
HGB BLD-MCNC: 9.5 G/DL
IMM GRANULOCYTES # BLD AUTO: 0.02 X10(3) UL (ref 0–1)
IMM GRANULOCYTES # BLD AUTO: 0.02 X10(3) UL (ref 0–1)
IMM GRANULOCYTES NFR BLD: 0.3 %
IMM GRANULOCYTES NFR BLD: 0.4 %
LYMPHOCYTES # BLD AUTO: 0.44 X10(3) UL (ref 1–4)
LYMPHOCYTES # BLD AUTO: 0.45 X10(3) UL (ref 1–4)
LYMPHOCYTES NFR BLD AUTO: 7.8 %
LYMPHOCYTES NFR BLD AUTO: 8.9 %
MAGNESIUM SERPL-MCNC: 1.8 MG/DL (ref 1.6–2.6)
MCH RBC QN AUTO: 30.7 PG (ref 26–34)
MCH RBC QN AUTO: 31.1 PG (ref 26–34)
MCHC RBC AUTO-ENTMCNC: 30.7 G/DL (ref 31–37)
MCHC RBC AUTO-ENTMCNC: 31.5 G/DL (ref 31–37)
MCV RBC AUTO: 100 FL
MCV RBC AUTO: 99 FL
MONOCYTES # BLD AUTO: 0.1 X10(3) UL (ref 0.1–1)
MONOCYTES # BLD AUTO: 0.12 X10(3) UL (ref 0.1–1)
MONOCYTES NFR BLD AUTO: 1.7 %
MONOCYTES NFR BLD AUTO: 2.4 %
NEUTROPHILS # BLD AUTO: 3.9 X10 (3) UL (ref 1.5–7.7)
NEUTROPHILS # BLD AUTO: 3.9 X10(3) UL (ref 1.5–7.7)
NEUTROPHILS # BLD AUTO: 4.67 X10 (3) UL (ref 1.5–7.7)
NEUTROPHILS # BLD AUTO: 4.67 X10(3) UL (ref 1.5–7.7)
NEUTROPHILS NFR BLD AUTO: 79 %
NEUTROPHILS NFR BLD AUTO: 81.3 %
OSMOLALITY SERPL CALC.SUM OF ELEC: 299 MOSM/KG (ref 275–295)
OSMOLALITY SERPL CALC.SUM OF ELEC: 302 MOSM/KG (ref 275–295)
PLATELET # BLD AUTO: 71 10(3)UL (ref 150–450)
PLATELET # BLD AUTO: 73 10(3)UL (ref 150–450)
PLATELETS.RETICULATED NFR BLD AUTO: 5.9 % (ref 0–7)
PLATELETS.RETICULATED NFR BLD AUTO: 6 % (ref 0–7)
POTASSIUM SERPL-SCNC: 3.5 MMOL/L (ref 3.5–5.1)
POTASSIUM SERPL-SCNC: 3.8 MMOL/L (ref 3.5–5.1)
RBC # BLD AUTO: 3.05 X10(6)UL
RBC # BLD AUTO: 3.09 X10(6)UL
SODIUM SERPL-SCNC: 140 MMOL/L (ref 136–145)
SODIUM SERPL-SCNC: 141 MMOL/L (ref 136–145)
WBC # BLD AUTO: 4.9 X10(3) UL (ref 4–11)
WBC # BLD AUTO: 5.8 X10(3) UL (ref 4–11)

## 2024-06-19 PROCEDURE — 99222 1ST HOSP IP/OBS MODERATE 55: CPT | Performed by: INTERNAL MEDICINE

## 2024-06-19 PROCEDURE — 99233 SBSQ HOSP IP/OBS HIGH 50: CPT | Performed by: HOSPITALIST

## 2024-06-19 RX ORDER — DIPHENHYDRAMINE HYDROCHLORIDE 50 MG/ML
25 INJECTION INTRAMUSCULAR; INTRAVENOUS EVERY 6 HOURS PRN
Status: DISCONTINUED | OUTPATIENT
Start: 2024-06-19 | End: 2024-06-21

## 2024-06-19 RX ORDER — TRAMADOL HYDROCHLORIDE 50 MG/1
50 TABLET ORAL EVERY 12 HOURS PRN
Status: DISCONTINUED | OUTPATIENT
Start: 2024-06-19 | End: 2024-06-19

## 2024-06-19 RX ORDER — TRAMADOL HYDROCHLORIDE 50 MG/1
50 TABLET ORAL ONCE
Status: COMPLETED | OUTPATIENT
Start: 2024-06-19 | End: 2024-06-19

## 2024-06-19 RX ORDER — MAGNESIUM OXIDE 400 MG/1
400 TABLET ORAL ONCE
Status: COMPLETED | OUTPATIENT
Start: 2024-06-19 | End: 2024-06-19

## 2024-06-19 NOTE — IMAGING NOTE
I spoke with Nurse Amelie to see if ordering Doctor would like a full or limited echo due to prior echo completion on 2/10/2024. She stated the Doctors where interested in a full echo.

## 2024-06-19 NOTE — CDS QUERY
.How to answer this Query:     1.) DON'T CLICK COSIGN BUTTON FIRST  2.) Click \"3 dots...\" to the right of cosign button and click EDIT on the toolbar.  2.) Type an \"X\" in the bracket for the diagnosis that applies. (You may also add additional clinical details as you feel necessary to substantiate your response).   3.) Finally click \"Sign\" to complete response.  Thank You    CLINICAL DOCUMENTATION CLARIFICATION     Dear Doctor Galan,  Clinical information (provided below) includes Chronic Kidney Disease . Please clarify the patient's renal status     PLEASE (X) DIAGNOSIS THAT APPLIES.       ( x  )  GEORGE on Chronic Kidney Disease Stage 4    (   )  Other (please specify): ____________________________          Clinical Indicators: eGFR 28-37-35.  Crea 1.80-1.44-1.50     H&P pmh: chronic kidney disease stage 3 to 4        Risk Factors: 78 y/o with history of CHF, CKD, HTN     Treatment: IV Bumex, monitor fluid status, and kidney function     If you have any questions, please contact Clinical :  Yanni NORWOOD at [phone]      Thank You!    THIS FORM IS A PERMANENT PART OF THE MEDICAL RECORD

## 2024-06-19 NOTE — H&P
[facility]    PATIENT'S NAME: DORIS ALEMAN   ATTENDING PHYSICIAN: Nixon Galan MD   PATIENT ACCOUNT#:   [id number]    LOCATION:  [address] [place] [address]  MEDICAL RECORD #:   [id number]       YOB: 1945  ADMISSION DATE:       06/18/2024    HISTORY AND PHYSICAL EXAMINATION    CHIEF COMPLAINT:  Acute on chronic systolic heart failure.     HISTORY OF PRESENT ILLNESS:  Patient is a 79-year-old  female with underlying nonischemic cardiomyopathy, presented today to the emergency department for evaluation of progressive weight gain and dyspnea on exertion with orthopnea.  CMP showed GFR of 28, which is below her baseline; BUN and creatinine of 45 and 1.80.  CBC showed hemoglobin of 9.5, .7, hemoglobin is at baseline.  Chest x-ray showed heart failure changes with pulmonary edema.  B-natriuretic peptide 2261.  EKG showed paced ventricular rhythm.  Patient was started on IV Bumex, and she will be admitted to the hospital for further management.      PAST MEDICAL HISTORY:  Multiple recent hospitalizations for heart failure and weight gain requiring milrinone and IV Bumex drip.  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  Paroxysmal atrial fibrillation.  Has been taken off Eliquis recently.      PAST SURGICAL HISTORY:  Mitral valve replacement and bilateral total shoulder arthroplasty.  Also, records indicate she had atrial appendage closure.     MEDICATIONS:  Please see medication reconciliation list.     FAMILY HISTORY:  Positive for hypertension.    SOCIAL HISTORY:  Ex-tobacco user.  No current tobacco, alcohol, or drug use.  Lives with her .  At baseline,  independent for basic activities of daily living.  Uses a walker on occasion.  There is a possibility of dietary indiscretion.    REVIEW OF SYSTEMS:  Progressive weight gain, dyspnea on exertion, orthopnea without chest pain.  Other 12-point review of systems is negative.       PHYSICAL EXAMINATION:    GENERAL:  Alert and oriented to time, place and person.  Moderate distress.  Visibly dyspneic.   VITAL SIGNS:  Temperature 98.0, pulse 59, respiratory rate 16, blood pressure 132/74, pulse ox 91% on room air.   HEENT:  Atraumatic.  Oropharynx clear.  Moist mucous membranes.  Normal hard and soft palate.  Eyes:  Anicteric sclerae.    NECK:  Supple.  No lymphadenopathy.  Trachea midline.  Full range of motion.  Jugular venous distention.  LUNGS:  Clear to auscultation bilaterally.  Normal respiratory effort.  Faint crackles on auscultation.  HEART:  Regular rate and rhythm.  S1 and S2 auscultated.  No murmur.    ABDOMEN:  Soft, nondistended.  No tenderness.  Positive bowel sounds.   EXTREMITIES:  There is +1 to 2 edema, both legs.      ASSESSMENT:    1.   Acute on chronic systolic heart failure.  2.   Acute on chronic kidney injury.  3.   Essential hypertension.   4.   Severe rheumatoid arthritis.    PLAN:  Patient will be admitted to telemetry floor.  IV Bumex.  Obtain nephrology and cardiology consults.  Monitor her hemodynamic status, fluid status, and kidney function.  Monitor electrolytes.  Fall precautions.  Obtain TSH with reflex T4.  Noted on recent laboratory studies TSH is elevated with normal free T4.  Further recommendations to follow.     Dictated By Azul Sahni MD  d: 06/18/2024 15:15:37  t: 06/18/2024 15:44:19  [facility] [id number]  /

## 2024-06-19 NOTE — CONSULTS
[facility]  part of [facility]    Nephrology Consult Note    Date of Admission:  6/18/2024  Date of Consult:  6/19/2024   Reason for Consultation:   CKD 3b    History of Present Illness:     Patient is a 79 year old female with past medical history of HTN, CKD 3b, HFrEF (EF 15-20%), s/p AICD, A.fib, s/p bioprosthetic MVR, RA, and h/o GIB/AVMs, and multiple recent admissions, who presented with shortness of breath on exertion, orthopnea, and weight gain. She is admitted with CHF exacerbation and started on Bumex 2 mg IV BID. She takes Bumex 1 mg daily at home.   Cr 1.8 on admission and 1.5 today.   She follows with Dr Arvizu outpatient.        Past Medical History  Past Medical History:    Acute kidney insufficiency    Anemia    Arrhythmia    CHF (congestive heart failure) (HCC)    CKD (chronic kidney disease) stage 3, GFR 30-59 ml/min (HCC)    Deep vein thrombosis (HCC)    Essential hypertension    History of blood transfusion    Rheumatoid arthritis (HCC)    Seizure disorder (HCC)       Past Surgical History  Past Surgical History:   Procedure Laterality Date    Colonoscopy N/A 01/17/2024    Dr. Rios    Colonoscopy N/A 1/17/2024    Procedure: COLONOSCOPY;  Surgeon: Zoraida Rios MD;  Location: [facility] ENDOSCOPY    Egd N/A 01/17/2024    Dr. Rios    Other surgical history  2017    Heart Surgery     Other surgical history  2020    Bilateral shoulder replacement        Family History  No family history on file.    Social History  Pediatric History   Patient Parents    Not on file     Other Topics Concern    Not on file   Social History Narrative    Not on file           Current Medications:  Current Facility-Administered Medications   Medication Dose Route Frequency    acetaminophen (Tylenol Extra Strength) tab 500 mg  500 mg Oral Q4H PRN    metoclopramide (Reglan) 5 mg/mL injection 5 mg  5 mg Intravenous Q8H PRN    bumetanide (Bumex) 0.25 MG/ML injection 2 mg  2 mg Intravenous BID (Diuretic)     amiodarone (Pacerone) tab 200 mg  200 mg Oral Daily    carvedilol (Coreg) tab 3.125 mg  3.125 mg Oral BID with meals    ferrous sulfate DR tab 325 mg  325 mg Oral Daily with breakfast    folic acid (Folvite) tab 800 mcg  800 mcg Oral Daily    gabapentin (Neurontin) cap 300 mg  300 mg Oral BID    HYDROcodone-acetaminophen (Norco)  MG per tab 1 tablet  1 tablet Oral Q6H PRN    isosorbide dinitrate (Isordil) tab 20 mg  20 mg Oral TID (Nitrates)    mirtazapine (Remeron) tab 7.5 mg  7.5 mg Oral Nightly    pantoprazole (Protonix) DR tab 40 mg  40 mg Oral BID AC    sacubitril-valsartan (Entresto) 49-51 MG per tab 1 tablet  1 tablet Oral BID       Allergies  Allergies   Allergen Reactions    Lisinopril Coughing       Review of Systems:   Review of Systems   Constitutional:  Positive for fatigue. Negative for appetite change, chills and fever.   HENT:  Negative for ear pain, rhinorrhea, sore throat and trouble swallowing.    Eyes:  Negative for pain and discharge.   Respiratory:  Positive for shortness of breath. Negative for cough and chest tightness.    Cardiovascular:  Positive for leg swelling. Negative for chest pain.   Gastrointestinal:  Negative for abdominal pain, constipation, diarrhea and vomiting.   Genitourinary:  Negative for decreased urine volume, dysuria and flank pain.   Musculoskeletal:  Positive for arthralgias. Negative for back pain, gait problem and myalgias.   Skin:  Negative for rash.   Neurological:  Negative for dizziness, speech difficulty, weakness, numbness and headaches.   Psychiatric/Behavioral:  Negative for agitation and confusion.        Physical Exam:   Height: 5' 4\" (162.6 cm) (06/18 1242)  Weight: 135 lb 11.2 oz (61.6 kg) (06/19 0510)  BSA (Calculated - sq m): 1.65 sq meters (06/18 1242)  Pulse: 66 (06/19 0210)  BP: 111/58 (06/19 0210)  Temp: 97.6 °F (36.4 °C) (06/19 0210)  Do Not Use - Resp Rate: --  SpO2: 98 % (06/19 0210)  Temp:  [97.5 °F (36.4 °C)-98.2 °F (36.8 °C)] 97.6 °F  (36.4 °C)  Pulse:  [59-72] 66  Resp:  [16-24] 16  BP: (111-144)/(53-88) 111/58  SpO2:  [91 %-100 %] 98 %  SpO2: 98 %     Intake/Output Summary (Last 24 hours) at 6/19/2024 0953  Last data filed at 6/18/2024 1704  Gross per 24 hour   Intake 220 ml   Output 0 ml   Net 220 ml     Wt Readings from Last 5 Encounters:   06/19/24 135 lb 11.2 oz (61.6 kg)   06/13/24 137 lb (62.1 kg)   06/03/24 120 lb 9.6 oz (54.7 kg)   05/30/24 129 lb 6.6 oz (58.7 kg)   05/18/24 127 lb (57.6 kg)       Physical Exam  Constitutional:       Appearance: Normal appearance.   HENT:      Head: Normocephalic and atraumatic.      Nose: Nose normal.   Eyes:      General: No scleral icterus.  Cardiovascular:      Rate and Rhythm: Normal rate and regular rhythm.      Heart sounds: Normal heart sounds. No murmur heard.  Pulmonary:      Effort: Pulmonary effort is normal.      Breath sounds: Normal breath sounds.   Abdominal:      General: Bowel sounds are normal. There is no distension.      Palpations: Abdomen is soft.   Musculoskeletal:         General: No tenderness. Normal range of motion.      Cervical back: Normal range of motion and neck supple.      Comments: 1+ edema   Skin:     General: Skin is warm and dry.      Findings: No rash.   Neurological:      General: No focal deficit present.      Mental Status: She is alert and oriented to person, place, and time. Mental status is at baseline.   Psychiatric:         Mood and Affect: Mood normal.         Behavior: Behavior normal.         Thought Content: Thought content normal.         Results:     Laboratory Data:  Recent Labs   Lab 06/18/24  1329   RBC 3.02*   HGB 9.5*   HCT 30.4*   .7*   NEPRELIM 3.71   WBC 4.8   PLT 78.0*     Recent Labs   Lab 06/18/24  1329   GLU 83   BUN 45*   CREATSERUM 1.80*   CA 9.4   ALB 4.1      K 4.6      CO2 23.0   ALKPHO 328*   AST 23   ALT 11   BILT 0.8   TP 7.7     PTT   Date Value Ref Range Status   02/09/2024 35.2 23.3 - 35.6 seconds Final      INR   Date Value Ref Range Status   02/09/2024 1.25 (H) 0.80 - 1.20 Final     Comment:     Only the INR (not the PT value) should be utilized for   the monitoring of oral anticoagulant therapy.     Recommended therapeutic ranges for anticoagulant therapy are   as follows:   2.0 - 3.0 All indications except for mechanical prosthetic   cardiac valves.     2.5 - 3.5 Mechanical prosthetic cardiac valves.       No results for input(s): \"BNP\" in the last 168 hours.       Imaging:  XR CHEST AP PORTABLE  (CPT=71045)    Result Date: 6/18/2024  CONCLUSION:  1. Mild cardiomegaly and prominent pulmonary vascularity with mild pulmonary congestive change and mild interstitial edema suggesting cardiac failure/fluid overload.  Moderate-sized right and smaller left pleural effusion.  Right perihilar scarring/atelectasis.  I cannot exclude right basal pneumonia and or atelectasis.  Follow-up studies are advised.    Dictated by (CST): Josue Hogan MD on 6/18/2024 at 1:54 PM     Finalized by (CST): Josue Hogan MD on 6/18/2024 at 1:56 PM              Impression and Recommendations:     Patient is a 79 year old female with past medical history of HTN, CKD 3b, HFrEF (EF 15-20%), s/p AICD, A.fib, s/p bioprosthetic MVR, RA, and h/o GIB/AVMs, and multiple recent admissions, who presented with shortness of breath on exertion, orthopnea, and weight gain.     GEORGE on CKD 3b:   Due to CRS. Cr improved to 1.5 with diuresis.     CHF exacerbation  Cont Bumex 2 mg IV BID.   Monitor I/Os.   Cards following.     Anemia:   Hb 9.5 today.       Thank you for allowing me to participate in the care of your patient.      Gagan Mejia MD  Staff Nephrologist  6/19/2024

## 2024-06-19 NOTE — PROGRESS NOTES
[facility]  part of [facility]    Progress Note    Margaret Silverio Patient Status:  Inpatient    3/14/1945 MRN [id number]   Location [facility] 3W/SW Attending Nixon Galan MD   Hosp Day # 1 PCP Johnathon Rodriguez DO     Chief Complaint:     Acute on chronic congestive heart failure     Subjective:   Subjective:    Patient seen and examined   Endorsing a HA,   Afebrile, normotensive.   No other complaints     Objective:   Blood pressure 122/60, pulse 60, temperature 98.3 °F (36.8 °C), temperature source Oral, resp. rate 16, height 5' 4\" (1.626 m), weight 135 lb 11.2 oz (61.6 kg), SpO2 100%.  Physical Exam    General: Patient is alert and oriented x3 does not appear to be in acute distress at this time  HEENT: EOMI PERRLA, atraumatic normocephalic  Cardiac: S1-S2 appreciated  Lungs: Good air entry bilaterally clear to auscultation  Abdomen: Soft nontender nondistended positive bowel sounds  Ext: Peripheral pulses are positive, +1 b/l LE edema  Neuro: No focal deficits noted  Psych: Normal mood  Skin: No rashes noted  MSK: Full range of motion intact      Results:   Lab Results   Component Value Date    WBC 4.9 2024    HGB 9.5 (L) 2024    HCT 30.2 (L) 2024    PLT 71.0 (L) 2024    CREATSERUM 1.44 (H) 2024    BUN 40 (H) 2024     2024    K 3.5 2024     2024    CO2 24.0 2024    GLU 84 2024    CA 9.1 2024    ALB 4.1 2024    ALKPHO 328 (H) 2024    BILT 0.8 2024    TP 7.7 2024    AST 23 2024    ALT 11 2024    PTT 35.2 2024    INR 1.25 (H) 2024    T4F 1.4 2024    TSH 7.534 (H) 2024    LIP 32 2024    ESRML 61 (H) 2024    CRP 5.10 (H) 2024    MG 1.8 2024    PHOS 3.9 2024    TROPHS 24 2024    B12 >2,000 (H) 2024       XR CHEST AP PORTABLE  (CPT=71045)    Result Date: 2024  CONCLUSION:  1. Mild  cardiomegaly and prominent pulmonary vascularity with mild pulmonary congestive change and mild interstitial edema suggesting cardiac failure/fluid overload.  Moderate-sized right and smaller left pleural effusion.  Right perihilar scarring/atelectasis.  I cannot exclude right basal pneumonia and or atelectasis.  Follow-up studies are advised.    Dictated by (CST): Josue Hogan MD on 6/18/2024 at 1:54 PM     Finalized by (CST): Josue Hogan MD on 6/18/2024 at 1:56 PM         EKG 12 Lead    Result Date: 6/18/2024  AV dual-paced rhythm Abnormal ECG When compared with ECG of 24-MAY-2024 17:25, Vent. rate has decreased BY  10 BPM Confirmed by Chris Mauro ([id number]) on 6/18/2024 3:04:54 PM     Assessment & Plan:       Acute on chronic systolic congestive heart failure  -Appreciate cardiology recommendations  -Currently being diuresed with IV Bumex  -Monitor strict I's and O's  -Echo ordered will follow-up  -The patient's BNP actually increased from 2002 almost 4000 today.  Chest x-ray reviewed  -Will continue to monitor closely    Acute on chronic kidney injury  -Continue to monitor while being diuresed  -Renal function improved down to 1.44 today  -Continue to monitor closely  -Repeat labs in a.m.  -Will appreciate nephrology recommendations    Elevated TSH levels  -T4 is 1.4 currently  -Outpatient follow-up    A-fib  -Currently AV paced on telemetry  -On amiodarone  -Not on AC due to history of GI bleed and thrombocytopenia.    Thrombocytopenia  -Patient's platelets are currently stable at this time  -Will monitor    Hypertension  -Currently blood pressure is controlled    VTE ppx: SCDs    Global A/P  -Reviewed previous consultant notes  -Reviewed CBC, BMP, Mag, and Phos  -Reviewed tests ordered  -Repeat labs in am  -MDM: High, severe exacerbation of chronic illness posing a threat to life. IV medications requiring close inpatient monitoring.       **Certification      PHYSICIAN Certification of Need for Inpatient  Hospitalization - Initial Certification    Patient will require inpatient services that will reasonably be expected to span two midnight's based on the clinical documentation in H+P.   Based on patients current state of illness, I anticipate that, after discharge, patient will require TBD.      Nixon Galan MD

## 2024-06-19 NOTE — PAYOR COMM NOTE
--------------  ADMISSION REVIEW   6/18  Payor: [employer]  Subscriber #:  [id number]  Authorization Number: [id number]    Admit date: 6/18/24  Admit time:  4:20 PM       REVIEW DOCUMENTATION:  ED Provider Notes signed by Shashi Williamson MD at 6/18/2024  3:11 PM    Patient Seen in: [facility] Emergency Department  History     Chief Complaint   Patient presents with    Difficulty Breathing     Stated Complaint: ANSELMO    Subjective:   HPI    79-year-old female presents for evaluation from CHF clinic for difficulty breathing, lower extremity swelling, and weight gain.   reports that she has been on about 10 pounds of water weight in the past few days.  She states that she has been feeling short of breath especially with exertion.  She denies any chest pain, fevers, chills, nausea, vomiting.  She reports compliance with her Bumex.    Objective:   Past Medical History:    Acute kidney insufficiency    Anemia    Arrhythmia    CHF (congestive heart failure) (HCC)    CKD (chronic kidney disease) stage 3, GFR 30-59 ml/min (HCC)    Deep vein thrombosis (HCC)    Essential hypertension    History of blood transfusion    Rheumatoid arthritis (HCC)    Seizure disorder (HCC)     Positive for stated complaint: ANSELMO  Other systems are as noted in HPI.  Constitutional and vital signs reviewed.      All other systems reviewed and negative except as noted above.    Physical Exam     ED Triage Vitals [06/18/24 1242]   /65   Pulse 60   Resp 24   Temp 98 °F (36.7 °C)   Temp src Temporal   SpO2 100 %   O2 Device None (Room air)       Current Vitals:   Vital Signs  BP: 132/74  Pulse: 59  Resp: 16  Temp: 98 °F (36.7 °C)  Temp src: Temporal  MAP (mmHg): 91    Oxygen Therapy  SpO2: 91 %  O2 Device: None (Room air)    Physical Exam  Vitals and nursing note reviewed.   Constitutional:       Appearance: Normal appearance.   HENT:      Head: Normocephalic and atraumatic.   Cardiovascular:      Rate and  Rhythm: Normal rate and regular rhythm.      Pulses: Normal pulses.           Radial pulses are 2+ on the right side and 2+ on the left side.      Heart sounds: Murmur heard.   Pulmonary:      Effort: Pulmonary effort is normal. Tachypnea present.      Breath sounds: Rales present.   Musculoskeletal:         General: Normal range of motion.      Cervical back: Normal range of motion.      Right lower le+ Pitting Edema present.      Left lower le+ Pitting Edema present.   Skin:     General: Skin is warm and dry.   Neurological:      General: No focal deficit present.      Mental Status: She is alert.     ED Course     Labs Reviewed   COMP METABOLIC PANEL (14) - Abnormal; Notable for the following components:       Result Value    BUN 45 (*)     Creatinine 1.80 (*)     BUN/CREA Ratio 25.0 (*)     Calculated Osmolality 299 (*)     eGFR-Cr 28 (*)     Alkaline Phosphatase 328 (*)     Globulin  3.6 (*)     All other components within normal limits   BNP (B TYPE NATRIURETIC PEPTIDE) - Abnormal; Notable for the following components:    Beta Natriuretic Peptide 2,261 (*)     All other components within normal limits   RBC MORPHOLOGY SCAN - Abnormal; Notable for the following components:    RBC Morphology See morphology below (*)     All other components within normal limits   CBC W/ DIFFERENTIAL - Abnormal; Notable for the following components:    RBC 3.02 (*)     HGB 9.5 (*)     HCT 30.4 (*)     .7 (*)     RDW-SD 62.1 (*)     RDW 17.6 (*)     PLT 78.0 (*)     Lymphocyte Absolute 0.54 (*)     All other components within normal limits   CBC WITH DIFFERENTIAL WITH PLATELET    Narrative:     The following orders were created for panel order CBC With Differential With Platelet.  Procedure                               Abnormality         Status                     ---------                               -----------         ------                     CBC W/ DIFFERENTIAL[[id number]]          Abnormal            Final  result                 Please view results for these tests on the individual orders.   SCAN SLIDE   RAINBOW DRAW LAVENDER   RAINBOW DRAW LIGHT GREEN   RAINBOW DRAW BLUE   EKG    Rate, intervals and axes as noted on EKG Report.  Rate: 60  Rhythm: av paced  Reading: av paced, no stemi, interpreted by myself.      ED Course as of 06/18/24 1511  ------------------------------------------------------------  Time: 06/18 1346  Value: XR CHEST AP PORTABLE  (CPT=71045)  Comment: Per my independent interpretation, patient's CXR demonstrates cardiomegaly, no pneumothorax.  Admission disposition: 6/18/2024  3:03 PM    Medical Decision Making  Differential diagnosis includes but is not limited to renal failure, congestive heart failure, electrolyte disturbance, pneumonia, etc.    Exam and imaging consistent with acute congestive heart failure exacerbation.  Chest x-ray with pleural effusions and pulmonary edema.  Patient's BNP is elevated.  CBC and chemistry were otherwise unremarkable.  She was started on IV Bumex and admitted with cardiology on consult.    Rhythm Strip: Rate 60 AV paced The cardiac monitor was ordered secondary to the patient's congestive heart failure.     Complicating factors: The patient  has a past medical history of Acute kidney insufficiency (05/15/2024), Anemia, Arrhythmia, CHF (congestive heart failure) ([facility]), CKD (chronic kidney disease) stage 3, GFR 30-59 ml/min ([facility]), Deep vein thrombosis ([facility]), Essential hypertension, History of blood transfusion, Rheumatoid arthritis ([facility]), and Seizure disorder ([facility]). and  has a past surgical history that includes other surgical history (2017); other surgical history (2020); egd (N/A, 01/17/2024); colonoscopy (N/A, 01/17/2024); and colonoscopy (N/A, 1/17/2024). that contribute to the medical complexity of this ED evaluation.     Medical Record Review: I personally reviewed available prior medical records for any recent pertinent discharge summaries, testing, and  procedures, and reviewed those reports.      Problems Addressed:  Acute on chronic congestive heart failure, unspecified heart failure type (HCC): chronic illness or injury with severe exacerbation, progression, or side effects of treatment  Pleural effusion, bilateral: acute illness or injury with systemic symptoms    Amount and/or Complexity of Data Reviewed  Independent Historian: spouse     Details: Reports 10 pound weight gain  External Data Reviewed: ECG and notes.     Details: EKG from May 24, 2024 reviewed, EKG was AV paced with a rate of 70.    Echo from 2/10/2024 reviewed, EF is 15 to 20%.  Labs: ordered. Decision-making details documented in ED Course.  Radiology: ordered and independent interpretation performed. Decision-making details documented in ED Course.  ECG/medicine tests: ordered and independent interpretation performed. Decision-making details documented in ED Course.  Discussion of management or test interpretation with external provider(s): Case discussed with Dr. Sahni who accepts admission, would like 2mg Bumex and nephrology consult as well as cards.  ZEKE Llanos accepts cards consult. Dr. Mejia with nephro is on consult.     Risk  Decision regarding hospitalization.      Disposition and Plan     Clinical Impression:  1. Acute on chronic congestive heart failure, unspecified heart failure type (HCC)    2. Pleural effusion, bilateral         Disposition:  Admit  6/18/2024  3:03 pm  Hospital Problems       Present on Admission  Date Reviewed: 6/13/2024            ICD-10-CM Noted POA    * (Principal) Acute on chronic congestive heart failure, unspecified heart failure type (HCC) I50.9 5/3/2023 Unknown               6/18 H&P    CHIEF COMPLAINT:  Acute on chronic systolic heart failure.      HISTORY OF PRESENT ILLNESS:  Patient is a 79-year-old  female with underlying nonischemic cardiomyopathy, presented today to the emergency department for evaluation of progressive  weight gain and dyspnea on exertion with orthopnea.  CMP showed GFR of 28, which is below her baseline; BUN and creatinine of 45 and 1.80.  CBC showed hemoglobin of 9.5, .7, hemoglobin is at baseline.  Chest x-ray showed heart failure changes with pulmonary edema.  B-natriuretic peptide 2261.  EKG showed paced ventricular rhythm.  Patient was started on IV Bumex, and she will be admitted to the hospital for further management.       PAST MEDICAL HISTORY:  Multiple recent hospitalizations for heart failure and weight gain requiring milrinone and IV Bumex drip.  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  Paroxysmal atrial fibrillation.  Has been taken off Eliquis recently.        REVIEW OF SYSTEMS:  Progressive weight gain, dyspnea on exertion, orthopnea without chest pain.  Other 12-point review of systems is negative.        PHYSICAL EXAMINATION:    GENERAL:  Alert and oriented to time, place and person.  Moderate distress.  Visibly dyspneic.   VITAL SIGNS:  Temperature 98.0, pulse 59, respiratory rate 16, blood pressure 132/74, pulse ox 91% on room air.   HEENT:  Atraumatic.  Oropharynx clear.  Moist mucous membranes.  Normal hard and soft palate.  Eyes:  Anicteric sclerae.    NECK:  Supple.  No lymphadenopathy.  Trachea midline.  Full range of motion.  Jugular venous distention.  LUNGS:  Clear to auscultation bilaterally.  Normal respiratory effort.  Faint crackles on auscultation.  HEART:  Regular rate and rhythm.  S1 and S2 auscultated.  No murmur.    ABDOMEN:  Soft, nondistended.  No tenderness.  Positive bowel sounds.   EXTREMITIES:  There is +1 to 2 edema, both legs.       ASSESSMENT:    1.        Acute on chronic systolic heart failure.  2.       Acute on chronic kidney injury.  3.       Essential hypertension.   4.       Severe rheumatoid arthritis.     PLAN:  Patient will be admitted to telemetry floor.  IV Bumex.  Obtain nephrology and cardiology consults.  Monitor her hemodynamic status, fluid status, and kidney function.  Monitor electrolytes.  Fall precautions.  Obtain TSH with reflex T4.  Noted on recent laboratory studies TSH is elevated with normal free T4.  Further recommendations to follow.           6/18 Cardiology     79-year-old female here for shortness of breath, was recently discharged 3 weeks ago shortness of breath, was feeling fine for 1 week and then recurred volume overload and shortness of breath.  Cardiology consulted for concern for heart failure given her history of nonischemic cardiomyopathy and severe valvular disease.        ED work up  EKG a paced, RV paced  Creatinine 1.8 increased from 1.4 on discharge  BNP 2200, chest x-ray with pulmonary edema  Hemoglobin stable 9.5 from baseline  Thrombocytopenia platelets 78 from baseline of 300        Assessment:    HFrEF: Declining ejection fraction over the last 6 months with recurrent heart failure admissions.  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  Volume overload and recurrent admissions in part due to RV failure from severe tricuspid regurgitation.  Severe tricuspid regurgitation: Wide-open TR based on echocardiogram February 2024, RV pressure underestimated.  In part due to pacemaker lead.   History of GI bleed, upper and lower GI January 2024 without significant findings  Atrial fibrillation not on anticoagulation due to GI bleed history        Plan:  Echocardiogram  Bumex IV 2 mg twice daily  Resume home heart failure meds  Will Dr. Sargent evaluated for BiV upgrade      Medications 06/17/24 06/18/24 06/19/24              bumetanide (Bumex) 0.25 MG/ML injection 1 mg  Dose: 1 mg  Freq: Once Route: IV  Start:  06/18/24 1515 End: 06/18/24 1524    1524 MB-Given          bumetanide (Bumex) 0.25 MG/ML injection 1 mg  Dose: 1 mg  Freq: Once Route: IV  Start: 06/18/24 1437 End: 06/18/24 1449    1449 GD-Given          bumetanide (Bumex) 0.25 MG/ML injection 2 mg  Dose: 2 mg  Freq: 2 times daily (diuretic) Route: IV  Start: 06/18/24 1700    1649 MF-Given        0900   1700                      acetaminophen (Tylenol Extra Strength) tab 500 mg  Dose: 500 mg  Freq: Every 4 hours PRN Route: OR  PRN Reason: Fever  PRN Comment: equal to or greater than 100.4  Start: 06/18/24 1634   Admin Instructions:   do not initiate oral therapy until 6-8 hours after the last IV acetaminophen dose if IV acetaminophen was used previously     1650 MF-Given           HYDROcodone-acetaminophen (Norco)  MG per tab 1 tablet  Dose: 1 tablet  Freq: Every 6 hours PRN Route: OR  PRN Reason: moderate pain  Start: 06/18/24 1716 1858 MF-Given   2258 -Given              Vitals (last day)       Date/Time Temp Pulse Resp BP SpO2 Weight O2 Device O2 Flow Rate (L/min) [facility]    06/19/24 0510 -- -- -- -- -- 135 lb 11.2 oz -- --     06/19/24 0210 97.6 °F (36.4 °C) 66 16 111/58 98 % -- Nasal cannula 1 L/min     06/18/24 2257 -- -- 16 144/80 98 % -- Nasal cannula 2 L/min     06/18/24 2016 97.5 °F (36.4 °C) 72 18 114/53 91 % -- None (Room air) --     06/18/24 1902 -- 60 20 137/88 95 % -- None (Room air) --     06/18/24 1710 -- -- -- -- -- 142 lb -- --     06/18/24 1634 98.2 °F (36.8 °C) 60 20 128/67 94 % -- None (Room air) -- MF    06/18/24 1632 -- 60 -- -- -- -- -- -- BP    06/18/24 1530 -- 60 20 128/58 91 % -- None (Room air) -- GD    06/18/24 1500 -- 59 16 132/74 91 % -- None (Room air) -- GD    06/18/24 1415 -- 60 20 -- 93 % -- None (Room air) -- GD    06/18/24 1242 98 °F (36.7 °C) 60 24 124/65 100 % 134 lb None (Room air) -- JS

## 2024-06-19 NOTE — PAYOR COMM NOTE
--------------  CONTINUED STAY REVIEW  6/19  Payor: [employer]  Subscriber #:  [id number]  Authorization Number: [id number]    Admit date: 6/18/24  Admit time:  4:20 PM    REVIEW DOCUMENTATION:  6/19 Cardiology      Subjective:  Denies cp, sob at rest. +RAM      Objective:  /86 (BP Location: Right arm)   Pulse 60   Temp 98.3 °F (36.8 °C) (Oral)   Resp 16   Ht 162.6 cm (5' 4\")   Wt 135 lb 11.2 oz (61.6 kg)   SpO2 100%   BMI 23.29 kg/m²        Review of Systems   Respiratory:  Positive for shortness of breath.        General: Alert and oriented x 3. No apparent distress.   HEENT: Normocephalic, anicteric sclera, neck supple, no thyromegaly or adenopathy.  Neck: No JVD, carotids 2+, no bruits.  Cardiac: Regular rate & rhythm. S1, S2 normal. No pericardial rub, S3, or extra cardiac sounds. + Murmur   Lungs: Clear without wheezes, rales, rhonchi or dullness.  Normal excursions and effort.  Abdomen: Soft, non-tender. No organosplenomegally, mass or rebound, BS-present.  Extremities: Without clubbing or cyanosis.  No left lower extremity edema, no right lower extremity edema.  Neurologic: Alert and oriented, normal affect. No focal defects  Skin: Warm and dry.         Medications:  Scheduled Medications    bumetanide  2 mg Intravenous BID (Diuretic)          Assessment:  80 y/o female pt presented for progressive wt gain, ram, & orthopnea      Acute on chronic HFrEF , EF 15-20% in Feb '24   - Declining EF over the last 6 months w/ recurrent HF admissions   - Pending EP evaluation for possible BiV upgrade   - I/o & wts not accurately being measured. RN notified   - GDMT w/ coreg, entresto, isordil, IV bumex   - HF order set & consult for post discharge HF Clinic f/u placed 6/19      Severe tricuspid regurgitation   - Wide open TR based on Feb '24 echo, repeat pending      Hx of GIB   - Upper & lower GI Jan '24 w/o significant findings      Afib   - AV paced on tele  - On coreg & amiodarone   - No  OAC due to hx of GIB      CKD   - Stable   - Nephrology consult pending      Plan:     BMP, pBNP & echocardiogram pending    Continue IVP bumex, coreg, entresto, isordil  Monitor accurate I/o ,daily wts & renal fx closely   Optimize GDMT as bp & renal fx permits  Pending EP evaluation for possible BiV upgrade           6/19 IM      Chief Complaint:      Acute on chronic congestive heart failure         Subjective:  Subjective:     Patient seen and examined   Endorsing a HA,         General: Patient is alert and oriented x3 does not appear to be in acute distress at this time  HEENT: EOMI PERRLA, atraumatic normocephalic  Cardiac: S1-S2 appreciated  Lungs: Good air entry bilaterally clear to auscultation  Abdomen: Soft nontender nondistended positive bowel sounds  Ext: Peripheral pulses are positive, +1 b/l LE edema  Neuro: No focal deficits noted  Psych: Normal mood  Skin: No rashes noted  MSK: Full range of motion intact              Results:        Lab Results   Component Value Date     WBC 4.9 06/19/2024     HGB 9.5 (L) 06/19/2024     HCT 30.2 (L) 06/19/2024     PLT 71.0 (L) 06/19/2024     CREATSERUM 1.44 (H) 06/19/2024     BUN 40 (H) 06/19/2024      06/19/2024     K 3.5 06/19/2024      06/19/2024     CO2 24.0 06/19/2024     GLU 84 06/19/2024     CA 9.1 06/19/2024       Assessment & Plan:  Acute on chronic systolic congestive heart failure  -Appreciate cardiology recommendations  -Currently being diuresed with IV Bumex  -Monitor strict I's and O's  -Echo ordered will follow-up  -The patient's BNP actually increased from 2002 almost 4000 today.  Chest x-ray reviewed  -Will continue to monitor closely     Acute on chronic kidney injury  -Continue to monitor while being diuresed  -Renal function improved down to 1.44 today  -Continue to monitor closely  -Repeat labs in a.m.  -Will appreciate nephrology recommendations     Elevated TSH levels  -T4 is 1.4 currently  -Outpatient follow-up      A-fib  -Currently AV paced on telemetry  -On amiodarone  -Not on AC due to history of GI bleed and thrombocytopenia.     Thrombocytopenia  -Patient's platelets are currently stable at this time  -Will monitor     Hypertension  -Currently blood pressure is controlled     VTE ppx: SCDs     Global A/P  -Reviewed previous consultant notes  -Reviewed CBC, BMP, Mag, and Phos  -Reviewed tests ordered  -Repeat labs in am  -MDM: High, severe exacerbation of chronic illness posing a threat to life. IV medications requiring close inpatient monitoring.            MEDICATIONS ADMINISTERED IN LAST 1 DAY:  acetaminophen (Tylenol Extra Strength) tab 500 mg       Date Action Dose Route User    6/19/2024 1321 Given 500 mg Oral Ullrich, Emily    6/18/2024 1650 Given 500 mg Oral Hanna Smith RN          amiodarone (Pacerone) tab 200 mg       Date Action Dose Route User    6/19/2024 1017 Given 200 mg Oral Ullrich, Emily          bumetanide (Bumex) 0.25 MG/ML injection 1 mg       Date Action Dose Route User    6/18/2024 1449 Given 1 mg Intravenous Quiana Martines RN          bumetanide (Bumex) 0.25 MG/ML injection 2 mg       Date Action Dose Route User    6/19/2024 1017 Given 2 mg Intravenous Ullrich, Emily    6/18/2024 1649 Given 2 mg Intravenous Hanna Smith RN          bumetanide (Bumex) 0.25 MG/ML injection 1 mg       Date Action Dose Route User    6/18/2024 1524 Given 1 mg Intravenous Danielle Rouse, JEFFERY          carvedilol (Coreg) tab 3.125 mg       Date Action Dose Route User    6/19/2024 1017 Given 3.125 mg Oral Ullrich, Emily    6/18/2024 1901 Given 3.125 mg Oral Hanna Smith RN          ferrous sulfate DR tab 325 mg       Date Action Dose Route User    6/19/2024 1017 Given 325 mg Oral Ullrich, Emily          folic acid (Folvite) tab 800 mcg       Date Action Dose Route User    6/19/2024 1234 Given 800 mcg Oral Ullrich, Emily          gabapentin (Neurontin) cap 300 mg       Date Action Dose Route  User    6/19/2024 1017 Given 300 mg Oral Ullrich, Amelie    6/18/2024 2018 Given by Other 300 mg Oral Cassandra Amezquita RN          heparin (Porcine) 5000 UNIT/ML injection 5,000 Units       Date Action Dose Route User    6/18/2024 2019 Given by Other 5,000 Units Subcutaneous (Right Lower Abdomen) Cassandra Amezquita RN          HYDROcodone-acetaminophen (Norco)  MG per tab 1 tablet       Date Action Dose Route User    6/19/2024 1017 Given 1 tablet Oral Ullrich, Amelie    6/18/2024 2258 Given 1 tablet Oral Cassandra Amezquita RN    6/18/2024 1858 Given 1 tablet Oral Hanna Smith RN          isosorbide dinitrate (Isordil) tab 20 mg       Date Action Dose Route User    6/19/2024 1234 Given 20 mg Oral Ullrich, Amelie    6/19/2024 1017 Given 20 mg Oral Ullrich, Amelie    6/18/2024 1901 Given 20 mg Oral Hanna Smith RN          magnesium oxide (Mag-Ox) tab 400 mg       Date Action Dose Route User    6/19/2024 1321 Given 400 mg Oral Ullrich Amelie          mirtazapine (Remeron) tab 7.5 mg       Date Action Dose Route User    6/18/2024 2018 Given by Other 7.5 mg Oral Cassandra Amezquita RN          pantoprazole (Protonix) DR tab 40 mg       Date Action Dose Route User    6/19/2024 0510 Given 40 mg Oral Cassandra Amezquita RN    6/18/2024 1901 Given 40 mg Oral Hanna Smith RN          sacubitril-valsartan (Entresto) 49-51 MG per tab 1 tablet       Date Action Dose Route User    6/19/2024 1017 Given 1 tablet Oral UllrichAmelie    6/18/2024 2018 Given by Other 1 tablet Oral Cassandra Amezquita RN            Vitals (last day)       Date/Time Temp Pulse Resp BP SpO2 Weight O2 Device O2 Flow Rate (L/min) [facility]    06/19/24 1234 -- 60 -- 122/60 -- -- -- -- EU    06/19/24 0950 98.3 °F (36.8 °C) 60 16 142/86 100 % -- None (Room air) --     06/19/24 0510 -- -- -- -- -- 135 lb 11.2 oz -- --     06/19/24 0210 97.6 °F (36.4 °C) 66 16 111/58 98 % -- Nasal cannula 1 L/min     06/18/24 8187 -- -- 16 144/80 98 % -- Nasal cannula 2 L/min      06/18/24 2016 97.5 °F (36.4 °C) 72 18 114/53 91 % -- None (Room air) --     06/18/24 1902 -- 60 20 137/88 95 % -- None (Room air) --     06/18/24 1710 -- -- -- -- -- 142 lb -- --     06/18/24 1634 98.2 °F (36.8 °C) 60 20 128/67 94 % -- None (Room air) --     06/18/24 1632 -- 60 -- -- -- -- -- -- BP    06/18/24 1530 -- 60 20 128/58 91 % -- None (Room air) --     06/18/24 1500 -- 59 16 132/74 91 % -- None (Room air) --     06/18/24 1415 -- 60 20 -- 93 % -- None (Room air) --     06/18/24 1242 98 °F (36.7 °C) 60 24 124/65 100 % 134 lb None (Room air) -- JS          CIWA Scores (since admission)       None

## 2024-06-19 NOTE — PLAN OF CARE
Margaret is resting in the bed, alert and oriented x 4, forgetful, on room air, on tele, on heparin subcutaneous for DVT prophylaxis, IV lasix administered, norco for pain, plan for diuresis per MD orders, ambulating with SBA x walker, purewick in place, all needs met at this time, fall precautions in place, call light within easy reach.     Problem: Patient Centered Care  Goal: Patient preferences are identified and integrated in the patient's plan of care  Description: Interventions:  - What would you like us to know as we care for you? I live with my   - Provide timely, complete, and accurate information to patient/family  - Incorporate patient and family knowledge, values, beliefs, and cultural backgrounds into the planning and delivery of care  - Encourage patient/family to participate in care and decision-making at the level they choose  - Honor patient and family perspectives and choices  Outcome: Progressing     Problem: Patient/Family Goals  Goal: Patient/Family Long Term Goal  Description: Patient's Long Term Goal: Improve pain    Interventions:  - vss  - take medication when scheduled  - non-pharmacological interventions such as scent therapy, relaxation exercises  - up to the chair  - repositioning  - See additional Care Plan goals for specific interventions  Outcome: Progressing  Goal: Patient/Family Short Term Goal  Description: Patient's Short Term Goal: Go Home    Interventions:   - vss  - labs  - diagnostic testing  - take medication as prescribed  - follow up with MD as recommended  - See additional Care Plan goals for specific interventions  Outcome: Progressing     Problem: CARDIOVASCULAR - ADULT  Goal: Maintains optimal cardiac output and hemodynamic stability  Description: INTERVENTIONS:  - Monitor vital signs, rhythm, and trends  - Monitor for bleeding, hypotension and signs of decreased cardiac output  - Evaluate effectiveness of vasoactive medications to optimize hemodynamic  stability  - Monitor arterial and/or venous puncture sites for bleeding and/or hematoma  - Assess quality of pulses, skin color and temperature  - Assess for signs of decreased coronary artery perfusion - ex. Angina  - Evaluate fluid balance, assess for edema, trend weights  Outcome: Progressing  Goal: Absence of cardiac arrhythmias or at baseline  Description: INTERVENTIONS:  - Continuous cardiac monitoring, monitor vital signs, obtain 12 lead EKG if indicated  - Evaluate effectiveness of antiarrhythmic and heart rate control medications as ordered  - Initiate emergency measures for life threatening arrhythmias  - Monitor electrolytes and administer replacement therapy as ordered  Outcome: Progressing

## 2024-06-19 NOTE — PROGRESS NOTES
[facility] Cardiology Progress Note    Margaret Silverio Patient Status:  Inpatient    3/14/1945 MRN [id number]   Location [facility] 3W/SW Attending Nixon Galan MD   Hosp Day # 1 PCP Johnathon Rodriguez DO     Subjective:  Denies cp, sob at rest. +RAM     Objective:  /86 (BP Location: Right arm)   Pulse 60   Temp 98.3 °F (36.8 °C) (Oral)   Resp 16   Ht 162.6 cm (5' 4\")   Wt 135 lb 11.2 oz (61.6 kg)   SpO2 100%   BMI 23.29 kg/m²     Telemetry: AV paced       Intake/Output:    Intake/Output Summary (Last 24 hours) at 2024 1031  Last data filed at 2024 1704  Gross per 24 hour   Intake 220 ml   Output 0 ml   Net 220 ml       Last 3 Weights   24 0510 135 lb 11.2 oz (61.6 kg)   24 1710 142 lb (64.4 kg)   24 1242 134 lb (60.8 kg)   24 1037 137 lb (62.1 kg)   24 0534 120 lb 9.6 oz (54.7 kg)   24 0639 120 lb 6.4 oz (54.6 kg)   24 0855 129 lb 12.8 oz (58.9 kg)   24 0548 138 lb 11.2 oz (62.9 kg)   24 0554 134 lb 1.6 oz (60.8 kg)   24 0440 129 lb 4.8 oz (58.7 kg)   24 0631 128 lb 8 oz (58.3 kg)   24 0534 127 lb (57.6 kg)   24 0642 130 lb 8 oz (59.2 kg)   24 0500 124 lb 12.8 oz (56.6 kg)   24 0504 123 lb 10.9 oz (56.1 kg)   24 2241 123 lb 14.4 oz (56.2 kg)   24 1724 125 lb 10.6 oz (57 kg)       Labs:  Recent Labs   Lab 24  1329   GLU 83   BUN 45*   CREATSERUM 1.80*   EGFRCR 28*   CA 9.4      K 4.6      CO2 23.0     Recent Labs   Lab 24  1329   RBC 3.02*   HGB 9.5*   HCT 30.4*   .7*   MCH 31.5   MCHC 31.3   RDW 17.6*   NEPRELIM 3.71   WBC 4.8   PLT 78.0*         No results for input(s): \"TROP\", \"TROPHS\", \"CK\" in the last 168 hours.    Diagnostics:         Review of Systems   Respiratory:  Positive for shortness of breath.    Cardiovascular: Negative.  Negative for chest pain, palpitations, orthopnea and leg swelling.     Physical Exam:    General:  Alert and oriented x 3. No apparent distress.   HEENT: Normocephalic, anicteric sclera, neck supple, no thyromegaly or adenopathy.  Neck: No JVD, carotids 2+, no bruits.  Cardiac: Regular rate & rhythm. S1, S2 normal. No pericardial rub, S3, or extra cardiac sounds. + Murmur   Lungs: Clear without wheezes, rales, rhonchi or dullness.  Normal excursions and effort.  Abdomen: Soft, non-tender. No organosplenomegally, mass or rebound, BS-present.  Extremities: Without clubbing or cyanosis.  No left lower extremity edema, no right lower extremity edema.  Neurologic: Alert and oriented, normal affect. No focal defects  Skin: Warm and dry.       Medications:   bumetanide  2 mg Intravenous BID (Diuretic)    amiodarone  200 mg Oral Daily    carvedilol  3.125 mg Oral BID with meals    ferrous sulfate  325 mg Oral Daily with breakfast    folic acid  800 mcg Oral Daily    gabapentin  300 mg Oral BID    isosorbide dinitrate  20 mg Oral TID (Nitrates)    mirtazapine  7.5 mg Oral Nightly    pantoprazole  40 mg Oral BID AC    sacubitril-valsartan  1 tablet Oral BID         Assessment:  78 y/o female pt presented for progressive wt gain, webb, & orthopnea     Acute on chronic HFrEF , EF 15-20% in Feb '24   - Declining EF over the last 6 months w/ recurrent HF admissions   - Pending EP evaluation for possible BiV upgrade   - I/o & wts not accurately being measured. RN notified   - GDMT w/ coreg, entresto, isordil, IV bumex   - HF order set & consult for post discharge HF Clinic f/u placed 6/19     Severe tricuspid regurgitation   - Wide open TR based on Feb '24 echo, repeat pending     Hx of GIB   - Upper & lower GI Jan '24 w/o significant findings     Afib   - AV paced on tele  - On coreg & amiodarone   - No OAC due to hx of GIB     CKD   - Stable   - Nephrology consult pending     Plan:    BMP, pBNP & echocardiogram pending    Continue IVP bumex, coreg, entresto, isordil  Monitor accurate I/o ,daily wts & renal fx closely    Optimize GDMT as bp & renal fx permits  Pending EP evaluation for possible BiV upgrade     YOLIE Bucio  6/19/2024  10:31 AM  Ph [phone] ([place])  Ph [phone] ([place])      Cardiology addendum:  Pt examined and assessed independently.Agree with above.  Patient's main complaint today is neck and head pain.  States breathing better although still some volume overload and swelling in the legs.  Continue diuretics.  Echo pending.  EP to assess if candidate for BiV upgrade.  If pressure stays high may be candidate for hydralazine  Ayush Whitten MD FACC L3

## 2024-06-19 NOTE — CARDIAC REHAB
CARDIAC REHAB HEART FAILURE EDUCATION    Activity: Bedrest at this time        Disease Process: Disease process reviewed.    Reviewed the following: DAILY WEIGHT MONITORING: monitors fall from baseline swelling in L/E                 SODIUM RESTRICTION: aware      FLUID RESTRICTION: aware      RISK FACTORS: understands      SMOKING CESSATION: non      HOME EXERCISE ACTIVITY: Pt has at home physical therapy       OUTPATIENT CARDIAC REHAB: Referred to Cardiac Rehabilitation Phase 2 not appropriate      WHEN TO CONTACT YOUR PHYSICIAN: lizzie      HEART FAILURE CLINIC: [phone]

## 2024-06-19 NOTE — CONSULTS
[facility]                                                 CARDIAC EP CONSULT NOTE      Patient Name: Margaret Silverio MRN: [id number]   : 3/14/1945 CSN: [id number]     CARDIAC EP CONSULT    Reason for consultation:   Asked by general cardiology Dr Boles to provide EP consult for evaluation and management of device based CHF care.      Assessment and Recommendations:     Acute on chronic congestive heart failure, systolic  LBBB due to chronic RV pacing  -She is a good candidate for upgrade to a Bi-V ICD.  I reviewed this with her in detail.    -This will occur as an outpt with Dr Sargent, after they visit in the office  -For now, continue mgt for acute HF exacerbation and optimization of medical rx    Thank you for the EP consultation.    Colt Montero MD  Cardiac EP  [facility]  2024  C5-EP      History of present illness:   The patient is a 79 year old with recurrent hospitalizations for acute decompensation of HFrEF.  She has declining LV EF despite medical therapy, and has a LBBB due to chronic RV pacing.  While here, she has been diuresed, and her symptoms have improved.  Her hemodynamics have been stable, with BP still allowing for med titration.  Currently, her only c/o is a posterior head ache.    ROS:   Constitutional: No fevers, chills, fatigue or night sweats.  ENT: No mouth pain, neck pain, running nose, headaches or swollen glands.  Skin: No rashes, pruritus or skin changes,  Respiratory: No cough, wheezing or shortness of breath.  Dyspnea resolved currently.  CV: No chest pain or claudication.  Musculoskeletal: Mild joint pain, stiffness.  : No dysuria or hematuria.  GI: No nausea, vomiting or diarrhea. No blood in stools.  Neurologic: No seizures, tremors, weakness or numbness. Posterior base headache.    Past Medical, Surgical, Family, and Social Histories:     Past Medical  History:    Acute kidney insufficiency    Anemia    Arrhythmia    CHF (congestive heart failure) (HCC)    CKD (chronic kidney disease) stage 3, GFR 30-59 ml/min (HCC)    Deep vein thrombosis (HCC)    Essential hypertension    History of blood transfusion    Rheumatoid arthritis (HCC)    Seizure disorder (HCC)     Past Surgical History:   Procedure Laterality Date    Colonoscopy N/A 01/17/2024    Dr. Rios    Colonoscopy N/A 1/17/2024    Procedure: COLONOSCOPY;  Surgeon: Zoraida Rios MD;  Location: [facility] ENDOSCOPY    Egd N/A 01/17/2024    Dr. Rios    Other surgical history  2017    Heart Surgery     Other surgical history  2020    Bilateral shoulder replacement      FHX:  No premature CAD/SCA.    SHX:  The patient reports that she quit smoking about 10 years ago. Her smoking use included cigarettes. She has never used smokeless tobacco. She reports that she does not drink alcohol and does not use drugs.    Allergies:     Allergies   Allergen Reactions    Lisinopril Coughing       Medications:      bumetanide  2 mg Intravenous BID (Diuretic)    amiodarone  200 mg Oral Daily    carvedilol  3.125 mg Oral BID with meals    ferrous sulfate  325 mg Oral Daily with breakfast    folic acid  800 mcg Oral Daily    gabapentin  300 mg Oral BID    isosorbide dinitrate  20 mg Oral TID (Nitrates)    mirtazapine  7.5 mg Oral Nightly    pantoprazole  40 mg Oral BID AC    sacubitril-valsartan  1 tablet Oral BID       PHYSICAL EXAM:   Blood pressure 137/65, pulse 60, temperature 98.3 °F (36.8 °C), temperature source Oral, resp. rate 22, height 162.6 cm (5' 4\"), weight 135 lb 11.2 oz (61.6 kg), SpO2 100%.  GEN - no distress  HEENT - normal conjunctiva, moist mucosa  Neck - supple, no LAD  Lungs - nonlabored, clear bilaterally  Heart - JVP 14 cm H2O, carotids normal; RRR, nl S1/S2; bilat trace LE edema  Abd - soft, nontender  Ext - hand contractures arthritic deformities  Neuro - alert, no facial droop or gross deficits  Psych - cooperative,  calm    LABS AND DATA:     Lab Results   Component Value Date    WBC 5.8 06/19/2024    HGB 9.5 (L) 06/19/2024    HCT 30.9 (L) 06/19/2024    PLT 73.0 (L) 06/19/2024    CREATSERUM 1.50 (H) 06/19/2024    BUN 41 (H) 06/19/2024     06/19/2024    K 3.8 06/19/2024     06/19/2024    CO2 24.0 06/19/2024    GLU 90 06/19/2024    CA 9.4 06/19/2024    ALB 4.1 06/18/2024    ALKPHO 328 (H) 06/18/2024    BILT 0.8 06/18/2024    TP 7.7 06/18/2024    AST 23 06/18/2024    ALT 11 06/18/2024    PTT 35.2 02/09/2024    INR 1.25 (H) 02/09/2024    T4F 1.4 06/18/2024    TSH 7.534 (H) 06/18/2024    LIP 32 01/13/2024    ESRML 61 (H) 04/23/2024    CRP 5.10 (H) 04/23/2024    MG 1.8 06/19/2024    PHOS 3.9 06/02/2024    B12 >2,000 (H) 02/03/2024       Imaging: I independently visualized all relevant chest imaging in PACS, agree with radiology interpretation except where noted.    XR CHEST AP PORTABLE  (CPT=71045)    Result Date: 6/18/2024  CONCLUSION:  1. Mild cardiomegaly and prominent pulmonary vascularity with mild pulmonary congestive change and mild interstitial edema suggesting cardiac failure/fluid overload.  Moderate-sized right and smaller left pleural effusion.  Right perihilar scarring/atelectasis.  I cannot exclude right basal pneumonia and or atelectasis.  Follow-up studies are advised.    Dictated by (CST): Josue Hogan MD on 6/18/2024 at 1:54 PM     Finalized by (CST): Josue Hogan MD on 6/18/2024 at 1:56 PM             ------------------------------------------------------------------------------------------------------------------------------       1 person assist/verbal cues

## 2024-06-19 NOTE — DISCHARGE INSTRUCTIONS
HOME HEALTH:  Sometimes managing your health at home requires assistance.  The [facility] team has recognized your preference to use [employer].  They can be reached at [phone].  The fax number for your reference is [phone].  A representative from the home health agency will contact you or your family to schedule your first visit.          Going Home    In this section you will find the tools which will guide you through the first few days after you leave the hospital. Continued use of these tools will help you develop the skills necessary to keep your heart failure under control.     Heart Failure Guidelines - place this worksheet on your refrigerator or somewhere you can refer to it daily to help you decide if your symptoms are under control, and what to do if they are not.     Home Care Instructions Following Heart Failure - the most important things to do every day include:     Weigh yourself  Take your medicines as prescribed  Limit your sodium (salt) and fluid intake  Know when to call your cardiologist, primary doctor, or nurse  Know when to seek emergency care    There is also a handy weight chart on which to record your weight every day, information on measuring fluids and limiting fluid intake, and a section for recording your thoughts or questions.     Things for You to Remember:   1. An appointment has been made for you to see your doctor or healthcare provider within 7 days of hospital discharge. It is important that you attend this appointment to make sure your symptoms are under control.     2. Your recommended sodium intake is 5349-3271 mg daily    3. Limit your fluid intake to no more than 2 liters or 64 ounces per day    4. Some exercise and activity is important to help keep your heart functioning and strong. Unless instructed not to exercise, you may walk at a slow to moderate pace for 10-15 minutes 2-3 days per week to start. Pace your activity to prevent  shortness of breath or fatigue. Stop exercise if you develop chest pain, lightheadedness, or significant shortness of breath.       Call Your Cardiologist If:   You gain 2 pounds overnight or 3-4 pounds in 3-5 days  You have more difficulty breathing  You are getting more tired with normal activity  You are more short of breath lying down, or awaken at night short of breath  You have swelling of your feet or legs  You urinate less often during the day and more often at night  You have cramps in your legs  You have blurred vision or see yellowish-green halos around objects of lights    Go to the Emergency Room If:   You have pain or tightness in your chest  You are extremely short of breath  You are coughing up pink-frothy mucus  You are traveling and develop symptoms of worsening heart failure    Additional Resources:   If you have questions or concerns about heart failure management, call your cardiologist.

## 2024-06-19 NOTE — DIETARY NOTE
NUTRITION EDUCATION NOTE     Received consult for heart failure nutrition education. Pt visited, found eating breakfast and family at bedside. Pt reports here about 2 weeks ago and was educated at that time. Pt reports no further education needed but open to receiving handout for reinforcement. Provided Heart Failure Nutrition Therapy NCM handout. No questions at this time. Please consult nutrition services if earlier intervention is indicated.     Fidelina Ambrose MS, HANS, RDN, LDN  Clinical Dietitian  P: [phone]

## 2024-06-19 NOTE — PLAN OF CARE
Problem: Patient Centered Care  Goal: Patient preferences are identified and integrated in the patient's plan of care  Description: Interventions:  - What would you like us to know as we care for you? From home  - Provide timely, complete, and accurate information to patient/family  - Incorporate patient and family knowledge, values, beliefs, and cultural backgrounds into the planning and delivery of care  - Encourage patient/family to participate in care and decision-making at the level they choose  - Honor patient and family perspectives and choices  Outcome: Progressing     Problem: CARDIOVASCULAR - ADULT  Goal: Maintains optimal cardiac output and hemodynamic stability  Description: INTERVENTIONS:  - Monitor vital signs, rhythm, and trends  - Monitor for bleeding, hypotension and signs of decreased cardiac output  - Evaluate effectiveness of vasoactive medications to optimize hemodynamic stability  - Monitor arterial and/or venous puncture sites for bleeding and/or hematoma  - Assess quality of pulses, skin color and temperature  - Assess for signs of decreased coronary artery perfusion - ex. Angina  - Evaluate fluid balance, assess for edema, trend weights  Outcome: Progressing  Goal: Absence of cardiac arrhythmias or at baseline  Description: INTERVENTIONS:  - Continuous cardiac monitoring, monitor vital signs, obtain 12 lead EKG if indicated  - Evaluate effectiveness of antiarrhythmic and heart rate control medications as ordered  - Initiate emergency measures for life threatening arrhythmias  - Monitor electrolytes and administer replacement therapy as ordered  Outcome: Progressing     Problem: RESPIRATORY - ADULT  Goal: Achieves optimal ventilation and oxygenation  Description: INTERVENTIONS:  - Assess for changes in respiratory status  - Assess for changes in mentation and behavior  - Position to facilitate oxygenation and minimize respiratory effort  - Oxygen supplementation based on oxygen saturation  or ABGs  - Provide Smoking Cessation handout, if applicable  - Encourage broncho-pulmonary hygiene including cough, deep breathe, Incentive Spirometry  - Assess the need for suctioning and perform as needed  - Assess and instruct to report SOB or any respiratory difficulty  - Respiratory Therapy support as indicated  - Manage/alleviate anxiety  - Monitor for signs/symptoms of CO2 retention  Outcome: Progressing     Problem: PAIN - ADULT  Goal: Verbalizes/displays adequate comfort level or patient's stated pain goal  Description: INTERVENTIONS:  - Encourage pt to monitor pain and request assistance  - Assess pain using appropriate pain scale  - Administer analgesics based on type and severity of pain and evaluate response  - Implement non-pharmacological measures as appropriate and evaluate response  - Consider cultural and social influences on pain and pain management  - Manage/alleviate anxiety  - Utilize distraction and/or relaxation techniques  - Monitor for opioid side effects  - Notify MD/LIP if interventions unsuccessful or patient reports new pain  - Anticipate increased pain with activity and pre-medicate as appropriate  Outcome: Progressing     Problem: SAFETY ADULT - FALL  Goal: Free from fall injury  Description: INTERVENTIONS:  - Assess pt frequently for physical needs  - Identify cognitive and physical deficits and behaviors that affect risk of falls.  - Stanley fall precautions as indicated by assessment.  - Educate pt/family on patient safety including physical limitations  - Instruct pt to call for assistance with activity based on assessment  - Modify environment to reduce risk of injury  - Provide assistive devices as appropriate  - Consider OT/PT consult to assist with strengthening/mobility  - Encourage toileting schedule  Outcome: Progressing     Problem: DISCHARGE PLANNING  Goal: Discharge to home or other facility with appropriate resources  Description: INTERVENTIONS:  - Identify barriers  to discharge w/pt and caregiver  - Include patient/family/discharge partner in discharge planning  - Arrange for needed discharge resources and transportation as appropriate  - Identify discharge learning needs (meds, wound care, etc)  - Arrange for interpreters to assist at discharge as needed  - Consider post-discharge preferences of patient/family/discharge partner  - Complete POLST form as appropriate  - Assess patient's ability to be responsible for managing their own health  - Refer to Case Management Department for coordinating discharge planning if the patient needs post-hospital services based on physician/LIP order or complex needs related to functional status, cognitive ability or social support system  Outcome: Progressing

## 2024-06-19 NOTE — PLAN OF CARE
Pt alert and oriented x4. Pt on room air during day. IV Bumex per orders. Pt up to chair one assist. PRN tylenol, norco, tramadol, and iv benadryl given with little to no pain relief.   Problem: Patient Centered Care  Goal: Patient preferences are identified and integrated in the patient's plan of care  Description: Interventions:  - What would you like us to know as we care for you? From home   - Provide timely, complete, and accurate information to patient/family  - Incorporate patient and family knowledge, values, beliefs, and cultural backgrounds into the planning and delivery of care  - Encourage patient/family to participate in care and decision-making at the level they choose  - Honor patient and family perspectives and choices  6/19/2024 1612 by Ullrich, Emily  Outcome: Progressing  6/19/2024 1612 by Ullrich, Emily  Outcome: Progressing     Problem: Patient/Family Goals  Goal: Patient/Family Long Term Goal  Description: Patient's Long Term Goal: Go home    Interventions:  - monitor vs,assess pain, I&O  - See additional Care Plan goals for specific interventions  6/19/2024 1612 by Ullrich, Emily  Outcome: Progressing  6/19/2024 1612 by Ullrich, Emily  Outcome: Progressing  Goal: Patient/Family Short Term Goal  Description: Patient's Short Term Goal: Manage pain and volume staus    Interventions:   - I&O, DW, IV bumex  - See additional Care Plan goals for specific interventions  6/19/2024 1612 by Ullrich, Emily  Outcome: Progressing  6/19/2024 1612 by Ullrich, Emily  Outcome: Progressing     Problem: CARDIOVASCULAR - ADULT  Goal: Maintains optimal cardiac output and hemodynamic stability  Description: INTERVENTIONS:  - Monitor vital signs, rhythm, and trends  - Monitor for bleeding, hypotension and signs of decreased cardiac output  - Evaluate effectiveness of vasoactive medications to optimize hemodynamic stability  - Monitor arterial and/or venous puncture sites for bleeding and/or hematoma  - Assess quality  of pulses, skin color and temperature  - Assess for signs of decreased coronary artery perfusion - ex. Angina  - Evaluate fluid balance, assess for edema, trend weights  6/19/2024 1612 by Ullrich, Emily  Outcome: Progressing  6/19/2024 1612 by Ullrich, Emily  Outcome: Progressing  Goal: Absence of cardiac arrhythmias or at baseline  Description: INTERVENTIONS:  - Continuous cardiac monitoring, monitor vital signs, obtain 12 lead EKG if indicated  - Evaluate effectiveness of antiarrhythmic and heart rate control medications as ordered  - Initiate emergency measures for life threatening arrhythmias  - Monitor electrolytes and administer replacement therapy as ordered  6/19/2024 1612 by Ullrich, Emily  Outcome: Progressing  6/19/2024 1612 by Ullrich, Emily  Outcome: Progressing     Problem: RESPIRATORY - ADULT  Goal: Achieves optimal ventilation and oxygenation  Description: INTERVENTIONS:  - Assess for changes in respiratory status  - Assess for changes in mentation and behavior  - Position to facilitate oxygenation and minimize respiratory effort  - Oxygen supplementation based on oxygen saturation or ABGs  - Provide Smoking Cessation handout, if applicable  - Encourage broncho-pulmonary hygiene including cough, deep breathe, Incentive Spirometry  - Assess the need for suctioning and perform as needed  - Assess and instruct to report SOB or any respiratory difficulty  - Respiratory Therapy support as indicated  - Manage/alleviate anxiety  - Monitor for signs/symptoms of CO2 retention  6/19/2024 1612 by Ullrich, Emily  Outcome: Progressing  6/19/2024 1612 by Ullrich, Emily  Outcome: Progressing     Problem: PAIN - ADULT  Goal: Verbalizes/displays adequate comfort level or patient's stated pain goal  Description: INTERVENTIONS:  - Encourage pt to monitor pain and request assistance  - Assess pain using appropriate pain scale  - Administer analgesics based on type and severity of pain and evaluate response  - Implement  non-pharmacological measures as appropriate and evaluate response  - Consider cultural and social influences on pain and pain management  - Manage/alleviate anxiety  - Utilize distraction and/or relaxation techniques  - Monitor for opioid side effects  - Notify MD/LIP if interventions unsuccessful or patient reports new pain  - Anticipate increased pain with activity and pre-medicate as appropriate  6/19/2024 1612 by Ullrich, Emily  Outcome: Progressing  6/19/2024 1612 by Ullrich, Emily  Outcome: Progressing     Problem: SAFETY ADULT - FALL  Goal: Free from fall injury  Description: INTERVENTIONS:  - Assess pt frequently for physical needs  - Identify cognitive and physical deficits and behaviors that affect risk of falls.  - [place] fall precautions as indicated by assessment.  - Educate pt/family on patient safety including physical limitations  - Instruct pt to call for assistance with activity based on assessment  - Modify environment to reduce risk of injury  - Provide assistive devices as appropriate  - Consider OT/PT consult to assist with strengthening/mobility  - Encourage toileting schedule  6/19/2024 1612 by Ullrich, Emily  Outcome: Progressing  6/19/2024 1612 by Ullrich, Emily  Outcome: Progressing     Problem: DISCHARGE PLANNING  Goal: Discharge to home or other facility with appropriate resources  Description: INTERVENTIONS:  - Identify barriers to discharge w/pt and caregiver  - Include patient/family/discharge partner in discharge planning  - Arrange for needed discharge resources and transportation as appropriate  - Identify discharge learning needs (meds, wound care, etc)  - Arrange for interpreters to assist at discharge as needed  - Consider post-discharge preferences of patient/family/discharge partner  - Complete POLST form as appropriate  - Assess patient's ability to be responsible for managing their own health  - Refer to Case Management Department for coordinating discharge planning if  the patient needs post-hospital services based on physician/LIP order or complex needs related to functional status, cognitive ability or social support system  6/19/2024 1612 by Ullrich, Emily  Outcome: Progressing  6/19/2024 1612 by Ullrich, Emily  Outcome: Progressing

## 2024-06-19 NOTE — CM/SW NOTE
06/19/24 1100   CM/BRADEN Referral Data   Referral Source Social Work (self-referral);Physician   Reason for Referral Discharge planning;Readmission   Informant EMR;Clinical Staff Member   Readmission Assessment   Factors that patient feels contributed to this readmission Acute/Chronic Clinical Presentation   Pt's living situation prior to admission? Home with family   Pt's level of independence at discharge? No assist/independent (minimal)   Did you know who and how to call someone if you felt worse? Yes   Was full assessment completed by BRADEN/ALEXEY on prior admission? Yes   Was the recommended discharge plan achieved? Yes   Was pt. discharged w/out services? No   Medical Hx   Does patient have an established PCP? Yes   Significant Past Medical/Mental Health Hx CKD, HTN, Arthritis, Afib, CHF   Patient Info   Patient's Current Mental Status at Time of Assessment Alert;Oriented   Patient's Home Environment House   Number of Levels in Home 1   Number of Stair in Home 0   Patient lives with Spouse/Significant other;Grandchild   Patient Status Prior to Admission   Independent with ADLs and Mobility Yes   Services in place prior to admission DME/Supplies at home;Home Health Care   Home Health Provider Info [facility]   Type of DME/Supplies Straight Cane;Wheeled Walker   Discharge Needs   Anticipated D/C needs To be determined     Patient is a readmission and known to social work from previous admissions.    The patient lives with her spouse and grandchildren.  The patient is independent with most ADLs at baseline.  The patient owns a cane and walker.    The patient is current with [facility] for RN/PT services.  Referral is sent in [facility] and TIERNEY has been entered to be signed.    Social work will follow up if there are any further discharge needs.    BRADEN/ALEXEY to remain available for support and/or discharge planning.     Lin Borges MSW, LSW  Discharge Planner [id number]

## 2024-06-20 ENCOUNTER — APPOINTMENT (OUTPATIENT)
Dept: CV DIAGNOSTICS | Facility: HOSPITAL | Age: 79
DRG: 291 | End: 2024-06-20
Attending: NURSE PRACTITIONER

## 2024-06-20 LAB
ANION GAP SERPL CALC-SCNC: 8 MMOL/L (ref 0–18)
BASOPHILS # BLD AUTO: 0.02 X10(3) UL (ref 0–0.2)
BASOPHILS NFR BLD AUTO: 0.4 %
BUN BLD-MCNC: 43 MG/DL (ref 9–23)
BUN/CREAT SERPL: 29.5 (ref 10–20)
CALCIUM BLD-MCNC: 8.8 MG/DL (ref 8.7–10.4)
CHLORIDE SERPL-SCNC: 107 MMOL/L (ref 98–112)
CO2 SERPL-SCNC: 25 MMOL/L (ref 21–32)
CREAT BLD-MCNC: 1.46 MG/DL
DEPRECATED RDW RBC AUTO: 57.4 FL (ref 35.1–46.3)
EGFRCR SERPLBLD CKD-EPI 2021: 37 ML/MIN/1.73M2 (ref 60–?)
EOSINOPHIL # BLD AUTO: 0.31 X10(3) UL (ref 0–0.7)
EOSINOPHIL NFR BLD AUTO: 5.8 %
ERYTHROCYTE [DISTWIDTH] IN BLOOD BY AUTOMATED COUNT: 17.2 % (ref 11–15)
GLUCOSE BLD-MCNC: 88 MG/DL (ref 70–99)
HCT VFR BLD AUTO: 28.1 %
HGB BLD-MCNC: 8.9 G/DL
IMM GRANULOCYTES # BLD AUTO: 0.02 X10(3) UL (ref 0–1)
IMM GRANULOCYTES NFR BLD: 0.4 %
LYMPHOCYTES # BLD AUTO: 0.53 X10(3) UL (ref 1–4)
LYMPHOCYTES NFR BLD AUTO: 9.9 %
MAGNESIUM SERPL-MCNC: 1.8 MG/DL (ref 1.6–2.6)
MCH RBC QN AUTO: 31.1 PG (ref 26–34)
MCHC RBC AUTO-ENTMCNC: 31.7 G/DL (ref 31–37)
MCV RBC AUTO: 98.3 FL
MONOCYTES # BLD AUTO: 0.07 X10(3) UL (ref 0.1–1)
MONOCYTES NFR BLD AUTO: 1.3 %
NEUTROPHILS # BLD AUTO: 4.4 X10 (3) UL (ref 1.5–7.7)
NEUTROPHILS # BLD AUTO: 4.4 X10(3) UL (ref 1.5–7.7)
NEUTROPHILS NFR BLD AUTO: 82.2 %
OSMOLALITY SERPL CALC.SUM OF ELEC: 300 MOSM/KG (ref 275–295)
PHOSPHATE SERPL-MCNC: 4.3 MG/DL (ref 2.4–5.1)
PLATELET # BLD AUTO: 74 10(3)UL (ref 150–450)
PLATELETS.RETICULATED NFR BLD AUTO: 6.1 % (ref 0–7)
POTASSIUM SERPL-SCNC: 3.5 MMOL/L (ref 3.5–5.1)
POTASSIUM SERPL-SCNC: 4.6 MMOL/L (ref 3.5–5.1)
RBC # BLD AUTO: 2.86 X10(6)UL
SODIUM SERPL-SCNC: 140 MMOL/L (ref 136–145)
WBC # BLD AUTO: 5.4 X10(3) UL (ref 4–11)

## 2024-06-20 PROCEDURE — 93306 TTE W/DOPPLER COMPLETE: CPT | Performed by: NURSE PRACTITIONER

## 2024-06-20 PROCEDURE — 99233 SBSQ HOSP IP/OBS HIGH 50: CPT | Performed by: HOSPITALIST

## 2024-06-20 PROCEDURE — 99232 SBSQ HOSP IP/OBS MODERATE 35: CPT | Performed by: INTERNAL MEDICINE

## 2024-06-20 RX ORDER — TRAMADOL HYDROCHLORIDE 50 MG/1
50 TABLET ORAL EVERY 12 HOURS PRN
Status: DISCONTINUED | OUTPATIENT
Start: 2024-06-20 | End: 2024-06-21

## 2024-06-20 RX ORDER — POTASSIUM CHLORIDE 20 MEQ/1
40 TABLET, EXTENDED RELEASE ORAL EVERY 4 HOURS
Status: COMPLETED | OUTPATIENT
Start: 2024-06-20 | End: 2024-06-20

## 2024-06-20 RX ORDER — MAGNESIUM OXIDE 400 MG/1
400 TABLET ORAL ONCE
Status: COMPLETED | OUTPATIENT
Start: 2024-06-20 | End: 2024-06-20

## 2024-06-20 RX ORDER — HYDROMORPHONE HYDROCHLORIDE 1 MG/ML
0.4 INJECTION, SOLUTION INTRAMUSCULAR; INTRAVENOUS; SUBCUTANEOUS ONCE
Status: COMPLETED | OUTPATIENT
Start: 2024-06-20 | End: 2024-06-20

## 2024-06-20 NOTE — PROGRESS NOTES
[facility]  part of [facility]    Progress Note    Margaret Silverio Patient Status:  Inpatient    3/14/1945 MRN [id number]   Location [facility] 3W/SW Attending Nixon Galan MD   Hosp Day # 2 PCP Johnathon Rodriguez DO     Chief Complaint:     Acute on chronic congestive heart failure     Subjective:   Subjective:    Patient seen and examined   Endorsing a HA neck pain.  Afebrile, normotensive.   No other complaints     Objective:   Blood pressure 134/63, pulse 60, temperature 98.7 °F (37.1 °C), temperature source Oral, resp. rate 18, height 5' 4\" (1.626 m), weight 133 lb 3.2 oz (60.4 kg), SpO2 92%.  Physical Exam    General: Patient is alert and oriented x3 does not appear to be in acute distress at this time  HEENT: EOMI PERRLA, atraumatic normocephalic  Cardiac: S1-S2 appreciated  Lungs: Good air entry bilaterally clear to auscultation  Abdomen: Soft nontender nondistended positive bowel sounds  Ext: Peripheral pulses are positive, +1 b/l LE edema  Neuro: No focal deficits noted  Psych: Normal mood  Skin: No rashes noted  MSK: Full range of motion intact      Results:   Lab Results   Component Value Date    WBC 5.4 2024    HGB 8.9 (L) 2024    HCT 28.1 (L) 2024    PLT 74.0 (L) 2024    CREATSERUM 1.46 (H) 2024    BUN 43 (H) 2024     2024    K 3.5 2024     2024    CO2 25.0 2024    GLU 88 2024    CA 8.8 2024    ALB 4.1 2024    ALKPHO 328 (H) 2024    BILT 0.8 2024    TP 7.7 2024    AST 23 2024    ALT 11 2024    PTT 35.2 2024    INR 1.25 (H) 2024    T4F 1.4 2024    TSH 7.534 (H) 2024    LIP 32 2024    ESRML 61 (H) 2024    CRP 5.10 (H) 2024    MG 1.8 2024    PHOS 4.3 2024    TROPHS 24 2024    B12 >2,000 (H) 2024       XR CHEST AP PORTABLE  (CPT=71045)    Result Date: 2024  CONCLUSION:  1.  Mild cardiomegaly and prominent pulmonary vascularity with mild pulmonary congestive change and mild interstitial edema suggesting cardiac failure/fluid overload.  Moderate-sized right and smaller left pleural effusion.  Right perihilar scarring/atelectasis.  I cannot exclude right basal pneumonia and or atelectasis.  Follow-up studies are advised.    Dictated by (CST): Josue Hogan MD on 6/18/2024 at 1:54 PM     Finalized by (CST): Josue Hogan MD on 6/18/2024 at 1:56 PM         EKG 12 Lead    Result Date: 6/18/2024  AV dual-paced rhythm Abnormal ECG When compared with ECG of 24-MAY-2024 17:25, Vent. rate has decreased BY  10 BPM Confirmed by Chris Mauro ([id number]) on 6/18/2024 3:04:54 PM     Assessment & Plan:       Acute on chronic systolic congestive heart failure  -Appreciate cardiology recommendations  -Currently being diuresed with IV Bumex  -Monitor strict I's and O's  -Echo ordered will follow-up  -The patient's BNP actually increased from 2002 almost 4000 today.  Chest x-ray reviewed  -Will continue to monitor closely    Acute on chronic kidney injury  -Continue to monitor while being diuresed  -Renal function improved down to 1.44 today  -Continue to monitor closely  -Repeat labs in a.m.  -Will appreciate nephrology recommendations    Elevated TSH levels  -T4 is 1.4 currently  -Outpatient follow-up    A-fib  -Currently AV paced on telemetry  -On amiodarone  -Not on AC due to history of GI bleed and thrombocytopenia.    Thrombocytopenia  -Patient's platelets are currently stable at this time  -Will monitor    Hypertension  -Currently blood pressure is controlled    VTE ppx: SCDs    Global A/P  -hgb stable  -respiratory status stable  -renal function stable.   -Neck pain - will try lidocaine patch.   -appreciate cardio recs, currently being diuresed with IV bumex.  -Reviewed previous consultant notes  -Reviewed CBC, BMP, Mag, and Phos  -Reviewed tests ordered  -Repeat labs in am  -MDM: High, severe  exacerbation of chronic illness posing a threat to life. IV medications requiring close inpatient monitoring.           Nixon Galan MD

## 2024-06-20 NOTE — PLAN OF CARE
Pain management plan discussed with patient. Will alternate tylenol and norco for pain along with heat packs for comfort. Neck collar in place from last admission, may use for comfort per neurosurgery.    Problem: Patient Centered Care  Goal: Patient preferences are identified and integrated in the patient's plan of care  Description: Interventions:  - What would you like us to know as we care for you?   - Provide timely, complete, and accurate information to patient/family  - Incorporate patient and family knowledge, values, beliefs, and cultural backgrounds into the planning and delivery of care  - Encourage patient/family to participate in care and decision-making at the level they choose  - Honor patient and family perspectives and choices  Outcome: Progressing     Problem: Patient/Family Goals  Goal: Patient/Family Long Term Goal  Description: Patient's Long Term Goal: Go home    Interventions:  - monitor vs,assess pain, I&O  - See additional Care Plan goals for specific interventions  Outcome: Progressing  Goal: Patient/Family Short Term Goal  Description: Patient's Short Term Goal: Manage pain and volume staus    Interventions:   - I&O, DW, IV bumex  - See additional Care Plan goals for specific interventions  Outcome: Progressing     Problem: CARDIOVASCULAR - ADULT  Goal: Maintains optimal cardiac output and hemodynamic stability  Description: INTERVENTIONS:  - Monitor vital signs, rhythm, and trends  - Monitor for bleeding, hypotension and signs of decreased cardiac output  - Evaluate effectiveness of vasoactive medications to optimize hemodynamic stability  - Monitor arterial and/or venous puncture sites for bleeding and/or hematoma  - Assess quality of pulses, skin color and temperature  - Assess for signs of decreased coronary artery perfusion - ex. Angina  - Evaluate fluid balance, assess for edema, trend weights  Outcome: Progressing  Goal: Absence of cardiac arrhythmias or at baseline  Description:  INTERVENTIONS:  - Continuous cardiac monitoring, monitor vital signs, obtain 12 lead EKG if indicated  - Evaluate effectiveness of antiarrhythmic and heart rate control medications as ordered  - Initiate emergency measures for life threatening arrhythmias  - Monitor electrolytes and administer replacement therapy as ordered  Outcome: Progressing     Problem: RESPIRATORY - ADULT  Goal: Achieves optimal ventilation and oxygenation  Description: INTERVENTIONS:  - Assess for changes in respiratory status  - Assess for changes in mentation and behavior  - Position to facilitate oxygenation and minimize respiratory effort  - Oxygen supplementation based on oxygen saturation or ABGs  - Provide Smoking Cessation handout, if applicable  - Encourage broncho-pulmonary hygiene including cough, deep breathe, Incentive Spirometry  - Assess the need for suctioning and perform as needed  - Assess and instruct to report SOB or any respiratory difficulty  - Respiratory Therapy support as indicated  - Manage/alleviate anxiety  - Monitor for signs/symptoms of CO2 retention  Outcome: Progressing     Problem: PAIN - ADULT  Goal: Verbalizes/displays adequate comfort level or patient's stated pain goal  Description: INTERVENTIONS:  - Encourage pt to monitor pain and request assistance  - Assess pain using appropriate pain scale  - Administer analgesics based on type and severity of pain and evaluate response  - Implement non-pharmacological measures as appropriate and evaluate response  - Consider cultural and social influences on pain and pain management  - Manage/alleviate anxiety  - Utilize distraction and/or relaxation techniques  - Monitor for opioid side effects  - Notify MD/LIP if interventions unsuccessful or patient reports new pain  - Anticipate increased pain with activity and pre-medicate as appropriate  Outcome: Progressing     Problem: SAFETY ADULT - FALL  Goal: Free from fall injury  Description: INTERVENTIONS:  - Assess pt  frequently for physical needs  - Identify cognitive and physical deficits and behaviors that affect risk of falls.  - [place] fall precautions as indicated by assessment.  - Educate pt/family on patient safety including physical limitations  - Instruct pt to call for assistance with activity based on assessment  - Modify environment to reduce risk of injury  - Provide assistive devices as appropriate  - Consider OT/PT consult to assist with strengthening/mobility  - Encourage toileting schedule  Outcome: Progressing     Problem: DISCHARGE PLANNING  Goal: Discharge to home or other facility with appropriate resources  Description: INTERVENTIONS:  - Identify barriers to discharge w/pt and caregiver  - Include patient/family/discharge partner in discharge planning  - Arrange for needed discharge resources and transportation as appropriate  - Identify discharge learning needs (meds, wound care, etc)  - Arrange for interpreters to assist at discharge as needed  - Consider post-discharge preferences of patient/family/discharge partner  - Complete POLST form as appropriate  - Assess patient's ability to be responsible for managing their own health  - Refer to Case Management Department for coordinating discharge planning if the patient needs post-hospital services based on physician/LIP order or complex needs related to functional status, cognitive ability or social support system  Outcome: Progressing

## 2024-06-20 NOTE — PLAN OF CARE
PRN pain meds given.  Problem: Patient Centered Care  Goal: Patient preferences are identified and integrated in the patient's plan of care  Description: Interventions:  - What would you like us to know as we care for you?   - Provide timely, complete, and accurate information to patient/family  - Incorporate patient and family knowledge, values, beliefs, and cultural backgrounds into the planning and delivery of care  - Encourage patient/family to participate in care and decision-making at the level they choose  - Honor patient and family perspectives and choices  6/20/2024 0301 by Marie Merrill RN  Outcome: Progressing  6/20/2024 0259 by Marie Merrill RN  Outcome: Progressing     Problem: Patient/Family Goals  Goal: Patient/Family Long Term Goal  Description: Patient's Long Term Goal: Go home    Interventions:  - monitor vs,assess pain, I&O  - See additional Care Plan goals for specific interventions  6/20/2024 0301 by Marie Merrill RN  Outcome: Progressing  6/20/2024 0259 by Marie Merrill RN  Outcome: Progressing  Goal: Patient/Family Short Term Goal  Description: Patient's Short Term Goal: Manage pain and volume staus    Interventions:   - I&O, DW, IV bumex  - See additional Care Plan goals for specific interventions  6/20/2024 0301 by Marie Merrill RN  Outcome: Progressing  6/20/2024 0259 by Marie Merrill RN  Outcome: Progressing     Problem: CARDIOVASCULAR - ADULT  Goal: Maintains optimal cardiac output and hemodynamic stability  Description: INTERVENTIONS:  - Monitor vital signs, rhythm, and trends  - Monitor for bleeding, hypotension and signs of decreased cardiac output  - Evaluate effectiveness of vasoactive medications to optimize hemodynamic stability  - Monitor arterial and/or venous puncture sites for bleeding and/or hematoma  - Assess quality of pulses, skin color and temperature  - Assess for signs of decreased coronary artery perfusion - ex. Angina  -  Evaluate fluid balance, assess for edema, trend weights  6/20/2024 0301 by Marie Merrill RN  Outcome: Progressing  6/20/2024 0259 by Marie Merrill RN  Outcome: Progressing  Goal: Absence of cardiac arrhythmias or at baseline  Description: INTERVENTIONS:  - Continuous cardiac monitoring, monitor vital signs, obtain 12 lead EKG if indicated  - Evaluate effectiveness of antiarrhythmic and heart rate control medications as ordered  - Initiate emergency measures for life threatening arrhythmias  - Monitor electrolytes and administer replacement therapy as ordered  6/20/2024 0301 by Marie Merrill RN  Outcome: Progressing  6/20/2024 0259 by Marie Merrill RN  Outcome: Progressing     Problem: RESPIRATORY - ADULT  Goal: Achieves optimal ventilation and oxygenation  Description: INTERVENTIONS:  - Assess for changes in respiratory status  - Assess for changes in mentation and behavior  - Position to facilitate oxygenation and minimize respiratory effort  - Oxygen supplementation based on oxygen saturation or ABGs  - Provide Smoking Cessation handout, if applicable  - Encourage broncho-pulmonary hygiene including cough, deep breathe, Incentive Spirometry  - Assess the need for suctioning and perform as needed  - Assess and instruct to report SOB or any respiratory difficulty  - Respiratory Therapy support as indicated  - Manage/alleviate anxiety  - Monitor for signs/symptoms of CO2 retention  6/20/2024 0301 by Marie Merrill RN  Outcome: Progressing  6/20/2024 0259 by Marie Merrill RN  Outcome: Progressing     Problem: PAIN - ADULT  Goal: Verbalizes/displays adequate comfort level or patient's stated pain goal  Description: INTERVENTIONS:  - Encourage pt to monitor pain and request assistance  - Assess pain using appropriate pain scale  - Administer analgesics based on type and severity of pain and evaluate response  - Implement non-pharmacological measures as appropriate and  evaluate response  - Consider cultural and social influences on pain and pain management  - Manage/alleviate anxiety  - Utilize distraction and/or relaxation techniques  - Monitor for opioid side effects  - Notify MD/LIP if interventions unsuccessful or patient reports new pain  - Anticipate increased pain with activity and pre-medicate as appropriate  6/20/2024 0301 by Marie Merrill RN  Outcome: Progressing  6/20/2024 0259 by Marie Merrill RN  Outcome: Progressing     Problem: SAFETY ADULT - FALL  Goal: Free from fall injury  Description: INTERVENTIONS:  - Assess pt frequently for physical needs  - Identify cognitive and physical deficits and behaviors that affect risk of falls.  - [place] fall precautions as indicated by assessment.  - Educate pt/family on patient safety including physical limitations  - Instruct pt to call for assistance with activity based on assessment  - Modify environment to reduce risk of injury  - Provide assistive devices as appropriate  - Consider OT/PT consult to assist with strengthening/mobility  - Encourage toileting schedule  6/20/2024 0301 by Marie Merrill RN  Outcome: Progressing  6/20/2024 0259 by Marie Merrill RN  Outcome: Progressing     Problem: DISCHARGE PLANNING  Goal: Discharge to home or other facility with appropriate resources  Description: INTERVENTIONS:  - Identify barriers to discharge w/pt and caregiver  - Include patient/family/discharge partner in discharge planning  - Arrange for needed discharge resources and transportation as appropriate  - Identify discharge learning needs (meds, wound care, etc)  - Arrange for interpreters to assist at discharge as needed  - Consider post-discharge preferences of patient/family/discharge partner  - Complete POLST form as appropriate  - Assess patient's ability to be responsible for managing their own health  - Refer to Case Management Department for coordinating discharge planning if the  patient needs post-hospital services based on physician/LIP order or complex needs related to functional status, cognitive ability or social support system  6/20/2024 0301 by Marie Merrill, RN  Outcome: Progressing  6/20/2024 0259 by Marie Merrill, RN  Outcome: Progressing

## 2024-06-20 NOTE — PAYOR COMM NOTE
--------------  CONTINUED STAY REVIEW  6/20  Payor: [employer]  Subscriber #:  [id number]  Authorization Number: [id number]    Admit date: 6/18/24  Admit time:  4:20 PM    REVIEW DOCUMENTATION:  6/20 Cardiology      Subjective:  Denies cp, sob at rest. +RAM . + pain in her head / neck      Objective:  /63 (BP Location: Right arm)   Pulse 60   Temp 98.7 °F (37.1 °C) (Oral)   Resp 18   Ht 162.6 cm (5' 4\")   Wt 133 lb 3.2 oz (60.4 kg)   SpO2 92%   BMI 22.86 kg/m²      Telemetry: AV paced         Intake/Output:     Intake/Output Summary (Last 24 hours) at 6/20/2024 1141  Last data filed at 6/20/2024 0856      Gross per 24 hour   Intake 340 ml   Output 850 ml   Net -510 ml             Recent Labs   Lab 06/19/24  1004 06/19/24  1211 06/20/24  0632   GLU 84 90 88   BUN 40* 41* 43*   CREATSERUM 1.44* 1.50* 1.46*   EGFRCR 37* 35* 37*   CA 9.1 9.4 8.8    141 140   K 3.5 3.8 3.5    108 107   CO2 24.0 24.0 25.0            Recent Labs   Lab 06/19/24  1003 06/19/24  1211 06/20/24  0632   RBC 3.05* 3.09* 2.86*   HGB 9.5* 9.5* 8.9*   HCT 30.2* 30.9* 28.1*   MCV 99.0 100.0 98.3   MCH 31.1 30.7 31.1   MCHC 31.5 30.7* 31.7   RDW 17.3* 17.2* 17.2*   NEPRELIM 3.90 4.67 4.40   WBC 4.9 5.8 5.4   PLT 71.0* 73.0* 74.0*            No results for input(s): \"TROP\", \"TROPHS\", \"CK\" in the last 168 hours.     Diagnostics:            Review of Systems   Respiratory:  Positive for shortness of breath.    Cardiovascular: Negative.  Negative for chest pain, palpitations, orthopnea and leg swelling.      Physical Exam:    General: Alert and oriented x 3. No apparent distress.   HEENT: Normocephalic, anicteric sclera, neck supple, no thyromegaly or adenopathy.  Neck: No JVD, carotids 2+, no bruits.  Cardiac: Regular rate & rhythm. S1, S2 normal. No pericardial rub, S3, or extra cardiac sounds. + Murmur   Lungs: Clear without wheezes, rales, rhonchi or dullness.  Normal excursions and effort.  Abdomen: Soft,  non-tender. No organosplenomegally, mass or rebound, BS-present.  Extremities: Without clubbing or cyanosis.  No left lower extremity edema, no right lower extremity edema.  Neurologic: Alert and oriented, normal affect. No focal defects  Skin: Warm and dry.         Medications:  Scheduled Medications    bumetanide  2 mg Intravenous BID (Diuretic)    amiodarone  200 mg Oral Daily    carvedilol  3.125 mg Oral BID with meals    ferrous sulfate  325 mg Oral Daily with breakfast    folic acid  800 mcg Oral Daily    gabapentin  300 mg Oral BID    isosorbide dinitrate  20 mg Oral TID (Nitrates)    mirtazapine  7.5 mg Oral Nightly    pantoprazole  40 mg Oral BID AC    sacubitril-valsartan  1 tablet Oral BID         Medication Infusions            Assessment:  80 y/o female pt presented for progressive wt gain, webb, & orthopnea      Acute on chronic HFrEF , EF 15-20% in Feb '24   - Declining EF over the last 6 months w/ recurrent HF admissions   - Good candidate for BiV upgrade per EP. Can f/u as outpatient with Dr. Sargent  - Gabriele neg 690ml over 24h per documentation   - GDMT w/ coreg, entresto, isordil, IV bumex   - HF order set & consult for post discharge HF Clinic f/u placed 6/19   - BNP 3899. Echocardiogram pending      Severe tricuspid regurgitation   - Wide open TR based on Feb '24 echo, repeat pending      Hx of GIB   - Upper & lower GI Jan '24 w/o significant findings      Afib   - AV paced on tele  - On coreg & amiodarone   - No OAC due to hx of GIB      CKD   - Stable   - Nephrology following      Plan:     Echocardiogram pending    Continue IVP bumex, coreg, entresto, isordil  Monitor accurate I/o ,daily wts & renal fx closely   Optimize GDMT as bp & renal fx permits  Replenish potassium per cardiac electrolyte protocol   Will defer head/neck pain to PMD     YOLIE Bucio  6/20/2024  11:45 AM  Ph [phone] ([place])  Ph [phone] ([place])       L3  Seen and examined bedside. Agree with the present  management, will follow with you.    Jeremias echocardiogram pending.  Continue Bumex Coreg Entresto and Isordil  CKD management as per nephrology  BiV ICD upgrade as outpatient with Dr. Sargent          MEDICATIONS ADMINISTERED IN LAST 1 DAY:  acetaminophen (Tylenol Extra Strength) tab 500 mg       Date Action Dose Route User    6/20/2024 1340 Given 500 mg Oral Morena Son RN    6/20/2024 0026 Given 500 mg Oral Marie Merrill RN    6/19/2024 2031 Given 500 mg Oral Marie Merrill RN          amiodarone (Pacerone) tab 200 mg       Date Action Dose Route User    6/20/2024 0859 Given 200 mg Oral SonMorena RN          bumetanide (Bumex) 0.25 MG/ML injection 2 mg       Date Action Dose Route User    6/20/2024 0858 Given 2 mg Intravenous Morena Son RN    6/19/2024 1637 Given 2 mg Intravenous Ullrich, Emily          carvedilol (Coreg) tab 3.125 mg       Date Action Dose Route User    6/20/2024 0859 Given 3.125 mg Oral SonMorena RN    6/19/2024 1738 Given 3.125 mg Oral Ullrich, Emily          ferrous sulfate DR tab 325 mg       Date Action Dose Route User    6/20/2024 0859 Given 325 mg Oral Morena Son RN          folic acid (Folvite) tab 800 mcg       Date Action Dose Route User    6/20/2024 0901 Given 800 mcg Oral Morena Son RN          gabapentin (Neurontin) cap 300 mg       Date Action Dose Route User    6/20/2024 0859 Given 300 mg Oral Morena Son RN    6/19/2024 2031 Given 300 mg Oral Marie Merrill RN          HYDROcodone-acetaminophen (Norco)  MG per tab 1 tablet       Date Action Dose Route User    6/20/2024 1203 Given 1 tablet Oral Morena Son RN    6/20/2024 0556 Given 1 tablet Oral Marie Merrill RN    6/19/2024 2354 Given 1 tablet Oral Marie Merrill RN    6/19/2024 1738 Given 1 tablet Oral Ullrich, Emily          isosorbide dinitrate (Isordil) tab 20 mg       Date Action Dose Route User    6/20/2024 1205 Given 20  mg Oral Morena Son RN    6/20/2024 0859 Given 20 mg Oral Morena Son RN    6/19/2024 1739 Given 20 mg Oral Ullrich, Emily          magnesium oxide (Mag-Ox) tab 400 mg       Date Action Dose Route User    6/20/2024 0858 Given 400 mg Oral Morena Son RN          mirtazapine (Remeron) tab 7.5 mg       Date Action Dose Route User    6/19/2024 2031 Given 7.5 mg Oral Marie Merrill RN          pantoprazole (Protonix) DR tab 40 mg       Date Action Dose Route User    6/20/2024 0556 Given 40 mg Oral Marie Merrill RN    6/19/2024 1638 Given 40 mg Oral Ullrich, Emily          Perflutren Lipid Microsphere (DEFINITY) 6.52 MG/ML injection 1.5 mL       Date Action Dose Route User    6/20/2024 1243 Given 1.5 mL Intravenous HodgesSakshi umana          potassium chloride (Klor-Con M20) tab 40 mEq       Date Action Dose Route User    6/20/2024 1205 Given 40 mEq Oral Morena Son RN          sacubitril-valsartan (Entresto) 49-51 MG per tab 1 tablet       Date Action Dose Route User    6/20/2024 0858 Given 1 tablet Oral Morena Son RN    6/19/2024 2031 Given 1 tablet Oral Marie Merrill RN          traMADol (Ultram) tab 50 mg       Date Action Dose Route User    6/19/2024 1635 Given 50 mg Oral Ullrich, Emily            Vitals (last day)       Date/Time Temp Pulse Resp BP SpO2 Weight O2 Device O2 Flow Rate (L/min) [facility]    06/20/24 1201 98.6 °F (37 °C) 61 16 144/70 93 % -- None (Room air) -- PB    06/20/24 0855 98.7 °F (37.1 °C) 60 18 134/63 92 % -- None (Room air) -- PB    06/20/24 0555 98.5 °F (36.9 °C) -- 18 116/60 94 % 133 lb 3.2 oz None (Room air) -- FW    06/19/24 2028 98.6 °F (37 °C) -- 18 139/66 92 % -- None (Room air) --     06/19/24 1739 -- 60 -- 119/68 -- -- -- --     06/19/24 1632 -- 60 22 137/65 -- -- -- --     06/19/24 1234 -- 60 -- 122/60 -- -- -- --     06/19/24 0950 98.3 °F (36.8 °C) 60 16 142/86 100 % -- None (Room air) --     06/19/24 0510 -- -- -- -- --  135 lb 11.2 oz -- -- JJ    06/19/24 0210 97.6 °F (36.4 °C) 66 16 111/58 98 % -- Nasal cannula 1 L/min PAIGE          CIWA Scores (since admission)       None

## 2024-06-20 NOTE — PROGRESS NOTES
[facility] Cardiology Progress Note    Margaret Silverio Patient Status:  Inpatient    3/14/1945 MRN [id number]   Location [facility] 3W/SW Attending Nixon Galan MD   Hosp Day # 2 PCP Johnathon Rodriguez,      Subjective:  Denies cp, sob at rest. +RAM . + pain in her head / neck     Objective:  /63 (BP Location: Right arm)   Pulse 60   Temp 98.7 °F (37.1 °C) (Oral)   Resp 18   Ht 162.6 cm (5' 4\")   Wt 133 lb 3.2 oz (60.4 kg)   SpO2 92%   BMI 22.86 kg/m²     Telemetry: AV paced       Intake/Output:    Intake/Output Summary (Last 24 hours) at 2024 1141  Last data filed at 2024 0856  Gross per 24 hour   Intake 340 ml   Output 850 ml   Net -510 ml       Last 3 Weights   24 0555 133 lb 3.2 oz (60.4 kg)   24 0510 135 lb 11.2 oz (61.6 kg)   24 1710 142 lb (64.4 kg)   24 1242 134 lb (60.8 kg)   24 1037 137 lb (62.1 kg)   24 0534 120 lb 9.6 oz (54.7 kg)   24 0639 120 lb 6.4 oz (54.6 kg)   24 0855 129 lb 12.8 oz (58.9 kg)   24 0548 138 lb 11.2 oz (62.9 kg)   24 0554 134 lb 1.6 oz (60.8 kg)   24 0440 129 lb 4.8 oz (58.7 kg)   24 0631 128 lb 8 oz (58.3 kg)   24 0534 127 lb (57.6 kg)   24 0642 130 lb 8 oz (59.2 kg)   24 0500 124 lb 12.8 oz (56.6 kg)   24 0504 123 lb 10.9 oz (56.1 kg)   24 2241 123 lb 14.4 oz (56.2 kg)   24 1724 125 lb 10.6 oz (57 kg)       Labs:  Recent Labs   Lab 24  1004 24  1211 24  0632   GLU 84 90 88   BUN 40* 41* 43*   CREATSERUM 1.44* 1.50* 1.46*   EGFRCR 37* 35* 37*   CA 9.1 9.4 8.8    141 140   K 3.5 3.8 3.5    108 107   CO2 24.0 24.0 25.0     Recent Labs   Lab 24  1003 24  1211 24  0632   RBC 3.05* 3.09* 2.86*   HGB 9.5* 9.5* 8.9*   HCT 30.2* 30.9* 28.1*   MCV 99.0 100.0 98.3   MCH 31.1 30.7 31.1   MCHC 31.5 30.7* 31.7   RDW 17.3* 17.2* 17.2*   NEPRELIM 3.90 4.67 4.40   WBC 4.9 5.8 5.4   PLT 71.0*  73.0* 74.0*         No results for input(s): \"TROP\", \"TROPHS\", \"CK\" in the last 168 hours.    Diagnostics:         Review of Systems   Respiratory:  Positive for shortness of breath.    Cardiovascular: Negative.  Negative for chest pain, palpitations, orthopnea and leg swelling.     Physical Exam:    General: Alert and oriented x 3. No apparent distress.   HEENT: Normocephalic, anicteric sclera, neck supple, no thyromegaly or adenopathy.  Neck: No JVD, carotids 2+, no bruits.  Cardiac: Regular rate & rhythm. S1, S2 normal. No pericardial rub, S3, or extra cardiac sounds. + Murmur   Lungs: Clear without wheezes, rales, rhonchi or dullness.  Normal excursions and effort.  Abdomen: Soft, non-tender. No organosplenomegally, mass or rebound, BS-present.  Extremities: Without clubbing or cyanosis.  No left lower extremity edema, no right lower extremity edema.  Neurologic: Alert and oriented, normal affect. No focal defects  Skin: Warm and dry.       Medications:   bumetanide  2 mg Intravenous BID (Diuretic)    amiodarone  200 mg Oral Daily    carvedilol  3.125 mg Oral BID with meals    ferrous sulfate  325 mg Oral Daily with breakfast    folic acid  800 mcg Oral Daily    gabapentin  300 mg Oral BID    isosorbide dinitrate  20 mg Oral TID (Nitrates)    mirtazapine  7.5 mg Oral Nightly    pantoprazole  40 mg Oral BID AC    sacubitril-valsartan  1 tablet Oral BID         Assessment:  78 y/o female pt presented for progressive wt gain, webb, & orthopnea     Acute on chronic HFrEF , EF 15-20% in Feb '24   - Declining EF over the last 6 months w/ recurrent HF admissions   - Good candidate for BiV upgrade per EP. Can f/u as outpatient with Dr. Sargent  - Gabriele neg 690ml over 24h per documentation   - GDMT w/ coreg, entresto, isordil, IV bumex   - HF order set & consult for post discharge HF Clinic f/u placed 6/19   - BNP 3899. Echocardiogram pending     Severe tricuspid regurgitation   - Wide open TR based on Feb '24 echo, repeat  pending     Hx of GIB   - Upper & lower GI Jan '24 w/o significant findings     Afib   - AV paced on tele  - On coreg & amiodarone   - No OAC due to hx of GIB     CKD   - Stable   - Nephrology following     Plan:    Echocardiogram pending    Continue IVP bumex, coreg, entresto, isordil  Monitor accurate I/o ,daily wts & renal fx closely   Optimize GDMT as bp & renal fx permits  Replenish potassium per cardiac electrolyte protocol   Will defer head/neck pain to PMD    YOLIE Bucio  6/20/2024  11:45 AM  Ph [phone] ([place])  Ph [phone] ([place])      L3  Seen and examined bedside. Agree with the present management, will follow with you.    Jeremias echocardiogram pending.  Continue Bumex Coreg Entresto and Isordil  CKD management as per nephrology  BiV ICD upgrade as outpatient with Dr. Sargent  Thank you for allowing me to participate in the care of your patient, feel free to contact me if you have any questions.    Tyler Vaz MD  [facility] [place]  Interventional Cardiology.  [phone]

## 2024-06-20 NOTE — PROGRESS NOTES
[facility]  part of [facility]    Nephrology Progress Note    Chief Complaint   Patient presents with    Difficulty Breathing       Subjective:   79 year old female, following for GEORGE on CKD 3b.     Has significant neck pain.   Shortness of breath has improved.     Review of Systems:   Review of Systems   Constitutional:  Positive for fatigue. Negative for chills and fever.   Respiratory:  Negative for cough and shortness of breath.    Cardiovascular:  Negative for chest pain and leg swelling.   Gastrointestinal:  Negative for abdominal pain, constipation, diarrhea, nausea and vomiting.   Genitourinary:  Negative for difficulty urinating, dysuria and flank pain.   Musculoskeletal:  Positive for arthralgias and neck pain.       Objective:   Temp:  [98.5 °F (36.9 °C)-98.7 °F (37.1 °C)] 98.6 °F (37 °C)  Pulse:  [60-61] 61  Resp:  [16-22] 16  BP: (116-144)/(60-70) 144/70  SpO2:  [92 %-94 %] 93 %  SpO2: 93 %     Intake/Output Summary (Last 24 hours) at 6/20/2024 1439  Last data filed at 6/20/2024 1249  Gross per 24 hour   Intake 340 ml   Output 1550 ml   Net -1210 ml     Wt Readings from Last 3 Encounters:   06/20/24 133 lb 3.2 oz (60.4 kg)   06/13/24 137 lb (62.1 kg)   06/03/24 120 lb 9.6 oz (54.7 kg)     Physical Exam  Constitutional:       Appearance: Normal appearance.   Cardiovascular:      Rate and Rhythm: Normal rate and regular rhythm.      Heart sounds: Normal heart sounds.   Pulmonary:      Effort: Pulmonary effort is normal.      Breath sounds: Normal breath sounds.   Musculoskeletal:         General: Normal range of motion.      Comments: Trace edema   Skin:     General: Skin is warm and dry.   Neurological:      General: No focal deficit present.      Mental Status: She is alert and oriented to person, place, and time.   Psychiatric:         Mood and Affect: Mood normal.         Behavior: Behavior normal.         Medications:  Current Facility-Administered Medications   Medication Dose  Route Frequency    potassium chloride (Klor-Con M20) tab 40 mEq  40 mEq Oral Q4H    diphenhydrAMINE (Benadryl) 50 mg/mL  injection 25 mg  25 mg Intravenous Q6H PRN    acetaminophen (Tylenol Extra Strength) tab 500 mg  500 mg Oral Q4H PRN    metoclopramide (Reglan) 5 mg/mL injection 5 mg  5 mg Intravenous Q8H PRN    bumetanide (Bumex) 0.25 MG/ML injection 2 mg  2 mg Intravenous BID (Diuretic)    amiodarone (Pacerone) tab 200 mg  200 mg Oral Daily    carvedilol (Coreg) tab 3.125 mg  3.125 mg Oral BID with meals    ferrous sulfate DR tab 325 mg  325 mg Oral Daily with breakfast    folic acid (Folvite) tab 800 mcg  800 mcg Oral Daily    gabapentin (Neurontin) cap 300 mg  300 mg Oral BID    HYDROcodone-acetaminophen (Norco)  MG per tab 1 tablet  1 tablet Oral Q6H PRN    isosorbide dinitrate (Isordil) tab 20 mg  20 mg Oral TID (Nitrates)    mirtazapine (Remeron) tab 7.5 mg  7.5 mg Oral Nightly    pantoprazole (Protonix) DR tab 40 mg  40 mg Oral BID AC    sacubitril-valsartan (Entresto) 49-51 MG per tab 1 tablet  1 tablet Oral BID              Results:     Recent Labs   Lab 06/19/24  1003 06/19/24  1211 06/20/24  0632   RBC 3.05* 3.09* 2.86*   HGB 9.5* 9.5* 8.9*   HCT 30.2* 30.9* 28.1*   MCV 99.0 100.0 98.3   NEPRELIM 3.90 4.67 4.40   WBC 4.9 5.8 5.4   PLT 71.0* 73.0* 74.0*     Recent Labs   Lab 06/18/24  1329 06/19/24  1004 06/19/24  1211 06/20/24  0632   GLU 83 84 90 88   BUN 45* 40* 41* 43*   CREATSERUM 1.80* 1.44* 1.50* 1.46*   CA 9.4 9.1 9.4 8.8   ALB 4.1  --   --   --     140 141 140   K 4.6 3.5 3.8 3.5    107 108 107   CO2 23.0 24.0 24.0 25.0   ALKPHO 328*  --   --   --    AST 23  --   --   --    ALT 11  --   --   --    BILT 0.8  --   --   --    TP 7.7  --   --   --      PTT   Date Value Ref Range Status   02/09/2024 35.2 23.3 - 35.6 seconds Final     INR   Date Value Ref Range Status   02/09/2024 1.25 (H) 0.80 - 1.20 Final     Comment:     Only the INR (not the PT value) should be utilized for    the monitoring of oral anticoagulant therapy.     Recommended therapeutic ranges for anticoagulant therapy are   as follows:   2.0 - 3.0 All indications except for mechanical prosthetic   cardiac valves.     2.5 - 3.5 Mechanical prosthetic cardiac valves.       No results for input(s): \"BNP\" in the last 168 hours.  Recent Labs   Lab 06/19/24  1004 06/20/24  0632   MG 1.8 1.8   PHOS  --  4.3        Recent Labs   Lab 06/18/24  1329 06/20/24  0632   PHOS  --  4.3   ALB 4.1  --          Assessment and Plan:   Patient is a 79 year old female with past medical history of HTN, CKD 3b, HFrEF (EF 15-20%), s/p AICD, A.fib, s/p bioprosthetic MVR, RA, and h/o GIB/AVMs, and multiple recent admissions, who presented with shortness of breath on exertion, orthopnea, and weight gain.      CKD 3b:   Cr 1.46, at baseline.      CHF exacerbation  Cont Bumex 2 mg IV BID.   Monitor I/Os.   Cards following.      Anemia:   Hb 8.9 today, monitor.       Gagan Mejia MD  6/20/2024

## 2024-06-21 VITALS
RESPIRATION RATE: 18 BRPM | HEIGHT: 64 IN | OXYGEN SATURATION: 93 % | SYSTOLIC BLOOD PRESSURE: 117 MMHG | TEMPERATURE: 98 F | WEIGHT: 140 LBS | DIASTOLIC BLOOD PRESSURE: 56 MMHG | HEART RATE: 60 BPM | BODY MASS INDEX: 23.9 KG/M2

## 2024-06-21 LAB
ANION GAP SERPL CALC-SCNC: 8 MMOL/L (ref 0–18)
BASOPHILS # BLD AUTO: 0.01 X10(3) UL (ref 0–0.2)
BASOPHILS NFR BLD AUTO: 0.1 %
BUN BLD-MCNC: 43 MG/DL (ref 9–23)
BUN/CREAT SERPL: 26.2 (ref 10–20)
CALCIUM BLD-MCNC: 8.9 MG/DL (ref 8.7–10.4)
CHLORIDE SERPL-SCNC: 107 MMOL/L (ref 98–112)
CO2 SERPL-SCNC: 25 MMOL/L (ref 21–32)
CREAT BLD-MCNC: 1.64 MG/DL
DEPRECATED RDW RBC AUTO: 57.6 FL (ref 35.1–46.3)
EGFRCR SERPLBLD CKD-EPI 2021: 32 ML/MIN/1.73M2 (ref 60–?)
EOSINOPHIL # BLD AUTO: 0.24 X10(3) UL (ref 0–0.7)
EOSINOPHIL NFR BLD AUTO: 3.5 %
ERYTHROCYTE [DISTWIDTH] IN BLOOD BY AUTOMATED COUNT: 17.1 % (ref 11–15)
GLUCOSE BLD-MCNC: 108 MG/DL (ref 70–99)
HCT VFR BLD AUTO: 25.8 %
HGB BLD-MCNC: 8.6 G/DL
IMM GRANULOCYTES # BLD AUTO: 0.04 X10(3) UL (ref 0–1)
IMM GRANULOCYTES NFR BLD: 0.6 %
LYMPHOCYTES # BLD AUTO: 0.52 X10(3) UL (ref 1–4)
LYMPHOCYTES NFR BLD AUTO: 7.5 %
MAGNESIUM SERPL-MCNC: 1.9 MG/DL (ref 1.6–2.6)
MCH RBC QN AUTO: 32.6 PG (ref 26–34)
MCHC RBC AUTO-ENTMCNC: 33.3 G/DL (ref 31–37)
MCV RBC AUTO: 97.7 FL
MONOCYTES # BLD AUTO: 0.07 X10(3) UL (ref 0.1–1)
MONOCYTES NFR BLD AUTO: 1 %
NEUTROPHILS # BLD AUTO: 6.02 X10 (3) UL (ref 1.5–7.7)
NEUTROPHILS # BLD AUTO: 6.02 X10(3) UL (ref 1.5–7.7)
NEUTROPHILS NFR BLD AUTO: 87.3 %
OSMOLALITY SERPL CALC.SUM OF ELEC: 301 MOSM/KG (ref 275–295)
PHOSPHATE SERPL-MCNC: 3.9 MG/DL (ref 2.4–5.1)
PLATELET # BLD AUTO: 79 10(3)UL (ref 150–450)
PLATELETS.RETICULATED NFR BLD AUTO: 6.2 % (ref 0–7)
POTASSIUM SERPL-SCNC: 4.5 MMOL/L (ref 3.5–5.1)
RBC # BLD AUTO: 2.64 X10(6)UL
SODIUM SERPL-SCNC: 140 MMOL/L (ref 136–145)
WBC # BLD AUTO: 6.9 X10(3) UL (ref 4–11)

## 2024-06-21 PROCEDURE — 99232 SBSQ HOSP IP/OBS MODERATE 35: CPT | Performed by: INTERNAL MEDICINE

## 2024-06-21 PROCEDURE — 99239 HOSP IP/OBS DSCHRG MGMT >30: CPT | Performed by: HOSPITALIST

## 2024-06-21 RX ORDER — BUMETANIDE 2 MG/1
2 TABLET ORAL DAILY
Qty: 30 TABLET | Refills: 0 | Status: SHIPPED | OUTPATIENT
Start: 2024-06-22

## 2024-06-21 RX ORDER — BUMETANIDE 1 MG/1
2 TABLET ORAL DAILY
Status: DISCONTINUED | OUTPATIENT
Start: 2024-06-21 | End: 2024-06-21

## 2024-06-21 RX ORDER — HYDROCODONE BITARTRATE AND ACETAMINOPHEN 10; 325 MG/1; MG/1
1 TABLET ORAL EVERY 6 HOURS PRN
Qty: 21 TABLET | Refills: 0 | Status: SHIPPED | OUTPATIENT
Start: 2024-06-21 | End: 2024-06-26

## 2024-06-21 NOTE — PROGRESS NOTES
[facility]  part of [facility]    Nephrology Progress Note    Chief Complaint   Patient presents with    Difficulty Breathing       Subjective:   79 year old female, following for GEORGE on CKD 3b.     Has significant neck pain.   Shortness of breath has improved.     Review of Systems:   Review of Systems   Constitutional:  Positive for fatigue. Negative for chills and fever.   Respiratory:  Negative for cough and shortness of breath.    Cardiovascular:  Negative for chest pain and leg swelling.   Gastrointestinal:  Negative for abdominal pain, constipation, diarrhea, nausea and vomiting.   Genitourinary:  Negative for difficulty urinating, dysuria and flank pain.   Musculoskeletal:  Positive for arthralgias and neck pain.       Objective:   Temp:  [98.2 °F (36.8 °C)-98.7 °F (37.1 °C)] 98.2 °F (36.8 °C)  Pulse:  [60-61] 60  Resp:  [18] 18  BP: (113-139)/(53-68) 117/56  SpO2:  [93 %-97 %] 93 %  SpO2: 93 %     Intake/Output Summary (Last 24 hours) at 6/21/2024 1415  Last data filed at 6/21/2024 1150  Gross per 24 hour   Intake 590 ml   Output 1200 ml   Net -610 ml     Wt Readings from Last 3 Encounters:   06/21/24 140 lb (63.5 kg)   06/13/24 137 lb (62.1 kg)   06/03/24 120 lb 9.6 oz (54.7 kg)     Physical Exam  Constitutional:       Appearance: Normal appearance.   Cardiovascular:      Rate and Rhythm: Normal rate and regular rhythm.      Heart sounds: Normal heart sounds.   Pulmonary:      Effort: Pulmonary effort is normal.      Breath sounds: Normal breath sounds.   Musculoskeletal:         General: Normal range of motion.      Comments: Trace edema   Skin:     General: Skin is warm and dry.   Neurological:      General: No focal deficit present.      Mental Status: She is alert and oriented to person, place, and time.   Psychiatric:         Mood and Affect: Mood normal.         Behavior: Behavior normal.         Medications:  Current Facility-Administered Medications   Medication Dose Route  Frequency    bumetanide (Bumex) tab 2 mg  2 mg Oral Daily    traMADol (Ultram) tab 50 mg  50 mg Oral Q12H PRN    diphenhydrAMINE (Benadryl) 50 mg/mL  injection 25 mg  25 mg Intravenous Q6H PRN    acetaminophen (Tylenol Extra Strength) tab 500 mg  500 mg Oral Q4H PRN    metoclopramide (Reglan) 5 mg/mL injection 5 mg  5 mg Intravenous Q8H PRN    amiodarone (Pacerone) tab 200 mg  200 mg Oral Daily    carvedilol (Coreg) tab 3.125 mg  3.125 mg Oral BID with meals    ferrous sulfate DR tab 325 mg  325 mg Oral Daily with breakfast    folic acid (Folvite) tab 800 mcg  800 mcg Oral Daily    gabapentin (Neurontin) cap 300 mg  300 mg Oral BID    HYDROcodone-acetaminophen (Norco)  MG per tab 1 tablet  1 tablet Oral Q6H PRN    isosorbide dinitrate (Isordil) tab 20 mg  20 mg Oral TID (Nitrates)    mirtazapine (Remeron) tab 7.5 mg  7.5 mg Oral Nightly    pantoprazole (Protonix) DR tab 40 mg  40 mg Oral BID AC    sacubitril-valsartan (Entresto) 49-51 MG per tab 1 tablet  1 tablet Oral BID              Results:     Recent Labs   Lab 06/19/24  1211 06/20/24  0632 06/21/24  0459   RBC 3.09* 2.86* 2.64*   HGB 9.5* 8.9* 8.6*   HCT 30.9* 28.1* 25.8*   .0 98.3 97.7   NEPRELIM 4.67 4.40 6.02   WBC 5.8 5.4 6.9   PLT 73.0* 74.0* 79.0*     Recent Labs   Lab 06/18/24  1329 06/19/24  1004 06/19/24  1211 06/20/24  0632 06/20/24 2007 06/21/24  0459   GLU 83   < > 90 88  --  108*   BUN 45*   < > 41* 43*  --  43*   CREATSERUM 1.80*   < > 1.50* 1.46*  --  1.64*   CA 9.4   < > 9.4 8.8  --  8.9   ALB 4.1  --   --   --   --   --       < > 141 140  --  140   K 4.6   < > 3.8 3.5 4.6 4.5      < > 108 107  --  107   CO2 23.0   < > 24.0 25.0  --  25.0   ALKPHO 328*  --   --   --   --   --    AST 23  --   --   --   --   --    ALT 11  --   --   --   --   --    BILT 0.8  --   --   --   --   --    TP 7.7  --   --   --   --   --     < > = values in this interval not displayed.     PTT   Date Value Ref Range Status   02/09/2024 35.2 23.3  - 35.6 seconds Final     INR   Date Value Ref Range Status   02/09/2024 1.25 (H) 0.80 - 1.20 Final     Comment:     Only the INR (not the PT value) should be utilized for   the monitoring of oral anticoagulant therapy.     Recommended therapeutic ranges for anticoagulant therapy are   as follows:   2.0 - 3.0 All indications except for mechanical prosthetic   cardiac valves.     2.5 - 3.5 Mechanical prosthetic cardiac valves.       No results for input(s): \"BNP\" in the last 168 hours.  Recent Labs   Lab 06/19/24  1004 06/20/24  0632 06/21/24  0459   MG 1.8 1.8 1.9   PHOS  --  4.3 3.9        Recent Labs   Lab 06/18/24  1329 06/20/24  0632 06/21/24  0459   PHOS  --  4.3 3.9   ALB 4.1  --   --          Assessment and Plan:   Patient is a 79 year old female with past medical history of HTN, CKD 3b, HFrEF (EF 15-20%), s/p AICD, A.fib, s/p bioprosthetic MVR, RA, and h/o GIB/AVMs, and multiple recent admissions, who presented with shortness of breath on exertion, orthopnea, and weight gain.      CKD 3b:   Cr 1.64, slightly up. Likely due to diuresis. Bumex changed to po.      CHF exacerbation  Cont Bumex 2 mg po daily and dc on the same.  Monitor I/Os.   Cards following.      Anemia:   Hb 8.6 today, monitor.       Gagan Mejia MD  6/21/2024

## 2024-06-21 NOTE — DIETARY NOTE
Brief Nutrition Note:    Pt admitted for acute on chronic congestive heart failure. RD consulted for heart failure diet. Pt seen 6/19 for heart failure diet education and provided with Heart Failure Nutrition Therapy NCM handout at that time. Will clear consult. Will notify RN. F/u per protocol. Please consult nutrition services if earlier intervention is indicated.    Fidelina Ambrose MS, HANS, RDN, LDN  Clinical Dietitian  P: [phone]

## 2024-06-21 NOTE — PLAN OF CARE
Problem: Patient Centered Care  Goal: Patient preferences are identified and integrated in the patient's plan of care  Description: Interventions:  - What would you like us to know as we care for you?   - Provide timely, complete, and accurate information to patient/family  - Incorporate patient and family knowledge, values, beliefs, and cultural backgrounds into the planning and delivery of care  - Encourage patient/family to participate in care and decision-making at the level they choose  - Honor patient and family perspectives and choices  Outcome: Progressing     Problem: Patient/Family Goals  Goal: Patient/Family Long Term Goal  Description: Patient's Long Term Goal: Go home    Interventions:  - monitor vs,assess pain, I&O  - See additional Care Plan goals for specific interventions  Outcome: Progressing  Goal: Patient/Family Short Term Goal  Description: Patient's Short Term Goal: Manage pain and volume staus    Interventions:   - I&O, DW, IV bumex  - See additional Care Plan goals for specific interventions  Outcome: Progressing     Problem: CARDIOVASCULAR - ADULT  Goal: Maintains optimal cardiac output and hemodynamic stability  Description: INTERVENTIONS:  - Monitor vital signs, rhythm, and trends  - Monitor for bleeding, hypotension and signs of decreased cardiac output  - Evaluate effectiveness of vasoactive medications to optimize hemodynamic stability  - Monitor arterial and/or venous puncture sites for bleeding and/or hematoma  - Assess quality of pulses, skin color and temperature  - Assess for signs of decreased coronary artery perfusion - ex. Angina  - Evaluate fluid balance, assess for edema, trend weights  Outcome: Progressing  Goal: Absence of cardiac arrhythmias or at baseline  Description: INTERVENTIONS:  - Continuous cardiac monitoring, monitor vital signs, obtain 12 lead EKG if indicated  - Evaluate effectiveness of antiarrhythmic and heart rate control medications as ordered  - Initiate  emergency measures for life threatening arrhythmias  - Monitor electrolytes and administer replacement therapy as ordered  Outcome: Progressing     Problem: RESPIRATORY - ADULT  Goal: Achieves optimal ventilation and oxygenation  Description: INTERVENTIONS:  - Assess for changes in respiratory status  - Assess for changes in mentation and behavior  - Position to facilitate oxygenation and minimize respiratory effort  - Oxygen supplementation based on oxygen saturation or ABGs  - Provide Smoking Cessation handout, if applicable  - Encourage broncho-pulmonary hygiene including cough, deep breathe, Incentive Spirometry  - Assess the need for suctioning and perform as needed  - Assess and instruct to report SOB or any respiratory difficulty  - Respiratory Therapy support as indicated  - Manage/alleviate anxiety  - Monitor for signs/symptoms of CO2 retention  Outcome: Progressing     Problem: PAIN - ADULT  Goal: Verbalizes/displays adequate comfort level or patient's stated pain goal  Description: INTERVENTIONS:  - Encourage pt to monitor pain and request assistance  - Assess pain using appropriate pain scale  - Administer analgesics based on type and severity of pain and evaluate response  - Implement non-pharmacological measures as appropriate and evaluate response  - Consider cultural and social influences on pain and pain management  - Manage/alleviate anxiety  - Utilize distraction and/or relaxation techniques  - Monitor for opioid side effects  - Notify MD/LIP if interventions unsuccessful or patient reports new pain  - Anticipate increased pain with activity and pre-medicate as appropriate  Outcome: Progressing     Problem: SAFETY ADULT - FALL  Goal: Free from fall injury  Description: INTERVENTIONS:  - Assess pt frequently for physical needs  - Identify cognitive and physical deficits and behaviors that affect risk of falls.  - [place] fall precautions as indicated by assessment.  - Educate pt/family on patient  safety including physical limitations  - Instruct pt to call for assistance with activity based on assessment  - Modify environment to reduce risk of injury  - Provide assistive devices as appropriate  - Consider OT/PT consult to assist with strengthening/mobility  - Encourage toileting schedule  Outcome: Progressing     Problem: DISCHARGE PLANNING  Goal: Discharge to home or other facility with appropriate resources  Description: INTERVENTIONS:  - Identify barriers to discharge w/pt and caregiver  - Include patient/family/discharge partner in discharge planning  - Arrange for needed discharge resources and transportation as appropriate  - Identify discharge learning needs (meds, wound care, etc)  - Arrange for interpreters to assist at discharge as needed  - Consider post-discharge preferences of patient/family/discharge partner  - Complete POLST form as appropriate  - Assess patient's ability to be responsible for managing their own health  - Refer to Case Management Department for coordinating discharge planning if the patient needs post-hospital services based on physician/LIP order or complex needs related to functional status, cognitive ability or social support system  Outcome: Progressing

## 2024-06-21 NOTE — PROGRESS NOTES
Progress Note  Margaret Silverio Patient Status:  Inpatient    3/14/1945 MRN [id number]   Location [facility] 3W/SW Attending Nixon Galan MD   Hosp Day # 3 PCP Johnathon Rodriguez DO     Primary Cardiologist: [place]     SUBJECTIVE:    Denies chest pain or orthopnea. Wants to go home, tried to leave overnight.     VITALS:  /53 (BP Location: Right arm)   Pulse 60   Temp 98.5 °F (36.9 °C) (Oral)   Resp 18   Ht 5' 4\" (1.626 m)   Wt 140 lb (63.5 kg)   SpO2 93%   BMI 24.03 kg/m²     INTAKE/OUTPUT:    Intake/Output Summary (Last 24 hours) at 2024 0848  Last data filed at 2024 0634  Gross per 24 hour   Intake 530 ml   Output 1200 ml   Net -670 ml     Last 3 Weights   24 0554 140 lb (63.5 kg)   24 0555 133 lb 3.2 oz (60.4 kg)   24 0510 135 lb 11.2 oz (61.6 kg)   24 1710 142 lb (64.4 kg)   24 1242 134 lb (60.8 kg)   24 1037 137 lb (62.1 kg)   24 0534 120 lb 9.6 oz (54.7 kg)   24 0639 120 lb 6.4 oz (54.6 kg)   24 0855 129 lb 12.8 oz (58.9 kg)   24 0548 138 lb 11.2 oz (62.9 kg)   24 0554 134 lb 1.6 oz (60.8 kg)   24 0440 129 lb 4.8 oz (58.7 kg)   24 0631 128 lb 8 oz (58.3 kg)   24 0534 127 lb (57.6 kg)   24 0642 130 lb 8 oz (59.2 kg)   24 0500 124 lb 12.8 oz (56.6 kg)   24 0504 123 lb 10.9 oz (56.1 kg)   24 2241 123 lb 14.4 oz (56.2 kg)   24 1724 125 lb 10.6 oz (57 kg)     LABS:  Recent Labs   Lab 24  1211 24  0632 24  0459   GLU 90 88  --  108*   BUN 41* 43*  --  43*   CREATSERUM 1.50* 1.46*  --  1.64*   EGFRCR 35* 37*  --  32*   CA 9.4 8.8  --  8.9    140  --  140   K 3.8 3.5 4.6 4.5    107  --  107   CO2 24.0 25.0  --  25.0     Recent Labs   Lab 24  1211 24  0459   RBC 3.09* 2.86* 2.64*   HGB 9.5* 8.9* 8.6*   HCT 30.9* 28.1* 25.8*   .0 98.3 97.7   MCH 30.7 31.1 32.6   MCHC 30.7* 31.7 33.3    RDW 17.2* 17.2* 17.1*   NEPRELIM 4.67 4.40 6.02   WBC 5.8 5.4 6.9   PLT 73.0* 74.0* 79.0*     No results for input(s): \"TROP\", \"CK\" in the last 168 hours.    DIAGNOSTICS:    TELEMETRY: Paced    ECHO 2/10/2024:  Conclusions:   1. Left ventricle: The cavity size was normal. Wall thickness was mildly      increased. Systolic function was markedly reduced. The estimated ejection      fraction was 30-35%, by biplane method of disks. Dyskinesis of the      anteroseptal and anterior walls. Unable to assess LV diastolic function      due to valvular repair/replacement.   2. Right ventricle: The cavity size was increased. Pacer wire noted in the      right ventricle.   3. Left atrium: The atrium was markedly dilated.   4. Right atrium: The atrium was dilated.   5. Atrial septum: There was no atrial level shunt.   6. Aortic valve: The findings were consistent with very mild stenosis. The      peak systolic velocity was 1.71m/sec. The mean systolic gradient was 6mm      Hg. The valve area (VTI) was 1.71cm^2.   7. Mitral valve: A bioprosthesis is present. There was no significant      regurgitation. The mean diastolic gradient was 6mm Hg.   8. Tricuspid valve: There was wide-open regurgitation.     ROS: Negative unless noted above     PHYSICAL EXAM:  General: Alert and oriented x 3. No apparent distress.  HEENT: Normocephalic, sclera are nonicteric. Hearing appropriate bilaterally.  Neck: No JVD or Carotid bruits. Trachea midline.   Cardiac: Regular rate and rhythm. S1, S2 auscultated.   Lungs: Clear without wheezes, rales, rhonchi or dullness. Chest expansion symmetrical. Regular effort.  Abdomen: Soft, non-tender, +BS. No hepatosplenomegaly or appreciable masses.   Extremities: Without clubbing, cyanosis or edema.  Peripheral pulses are 2+.  Neurologic: Motor and sensory nerves grossly intact.   Psych: Appropriate affect   Skin: Warm and dry. No obvious lesions, wounds, or ulcerations.     MEDICATIONS:   bumetanide  2 mg  Intravenous BID (Diuretic)    amiodarone  200 mg Oral Daily    carvedilol  3.125 mg Oral BID with meals    ferrous sulfate  325 mg Oral Daily with breakfast    folic acid  800 mcg Oral Daily    gabapentin  300 mg Oral BID    isosorbide dinitrate  20 mg Oral TID (Nitrates)    mirtazapine  7.5 mg Oral Nightly    pantoprazole  40 mg Oral BID AC    sacubitril-valsartan  1 tablet Oral BID     ASSESSMENT:    Acute on Chronic HFrEF, NICM, LVEF 30-35% (improved from 15-20%/ Wide-Open TR  - Recurrent readmission, during her recent admission she required inotropes   - BNP 3899, CXR mild edema  - s/p IVP Bumex, 1.2L out the past 24 hours, Wts appears inaccurate   - Appears compensated  - Entresto, Isordil, Coreg, PO Bumex    HTN- Controlled     PAF, Flutter; [place] DC-ICD 9/2023  - Controlled on Amiodarone and Coreg. TSH 7, T4 normal   - Not on a/c due to history of GIB    CKD IIIb- Stable, nephrology following   S/p Bioprosthetic Mitral Valve  Chronic Anemia/ Thrombocytopenia- Stable    Hx Recurrent GIB  Hx of Possible TIA/Seizures November 2022    PLAN:  - BiV upgrade to be discussed with Dr. Urias at one week follow up visit   - Appears stable for discharge from a cardiology standpoint, BMP in one week     Plan of care discussed with patient and RN.     Catrachita Mason, APRN  6/21/2024  8:48 AM  [phone] ([place])  [phone] ([place])

## 2024-06-21 NOTE — DISCHARGE SUMMARY
[facility]  part of [facility]     Discharge Summary    Margaret Silverio Patient Status:  Inpatient    3/14/1945 MRN [id number]   Location [facility] 3W/SW Attending Nixon Galan MD   Hosp Day # 3 PCP Johnathon Rodriguez DO     Date of Admission: 2024    Date of Discharge: 2024.    Admitting Diagnosis: Pleural effusion, bilateral [J90]  Acute on chronic congestive heart failure, unspecified heart failure type (HCC) [I50.9]    Discharge Diagnosis:   Patient Active Problem List   Diagnosis    Altered mental status    Altered mental status, unspecified altered mental status type    Seizure (HCC)    Lactic acidosis    Leukocytosis    Acute GI bleeding    GEORGE (acute kidney injury) (HCC)    Thrombocytopenia (HCC)    Metabolic acidosis    Atrial fibrillation, chronic (HCC)    Sepsis due to urinary tract infection (HCC)    Sepsis due to Escherichia coli (HCC)    ABLA (acute blood loss anemia)    Melena    Atrial fibrillation with rapid ventricular response (HCC)    Acute chest pain    Acute on chronic congestive heart failure, unspecified heart failure type (HCC)    Pleural effusion    ACC/AHA stage B systolic heart failure (HCC)    Congestive heart failure (HCC)    Coronary arteriosclerosis    Essential (primary) hypertension    Mitral valve stenosis    Rheumatoid arthritis (HCC)    Stage 3 chronic kidney disease (HCC)    Atrial flutter (HCC)    Acute on chronic HFrEF (heart failure with reduced ejection fraction) (HCC)    Dyspnea, unspecified type    Hypothermia, initial encounter    Anticoagulated    Coagulopathy (HCC)    SIRS (systemic inflammatory response syndrome) (HCC)    Anemia due to stage 3 chronic kidney disease (HCC)    Anemia due to GI blood loss    Anemia of chronic disease    Lymphopenia    Pancytopenia (HCC)    Goals of care, counseling/discussion    Advance care planning    Palliative care by specialist    Acute deep vein thrombosis (DVT) of left upper  extremity (HCC)    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HCC)    Cardiogenic shock (HCC)    HFrEF (heart failure with reduced ejection fraction) (HCC)    Anemia    Acute kidney injury (HCC)    Iron deficiency anemia, unspecified iron deficiency anemia type    Weakness generalized    Acute kidney insufficiency    Hyperkalemia    Acute respiratory failure with hypoxia (HCC)    Cervical pain    Cervical radiculopathy    Pleural effusion, bilateral    Acute on chronic systolic (congestive) heart failure (HCC)       Reason for Admission:     Acute on chronic congestive heart failure    Physical Exam:     General: Patient is alert and oriented x3 does not appear to be in acute distress at this time  HEENT: EOMI PERRLA, atraumatic normocephalic  Cardiac: S1-S2 appreciated  Lungs: Good air entry bilaterally clear to auscultation  Abdomen: Soft nontender nondistended positive bowel sounds  Ext: Peripheral pulses are positive  Neuro: No focal deficits noted  Psych: Normal mood  Skin: No rashes noted  MSK: Full range of motion intact      Hospital Course:     Acute on chronic systolic congestive heart failure  -Appreciate cardiology recommendations  -Currently being diuresed with IV Bumex  -Monitor strict I's and O's  -Echo ordered will follow-up  -The patient's BNP actually increased from 2002 almost 4000 today.  Chest x-ray reviewed  -Will continue to monitor closely     Acute on chronic kidney injury  -Continue to monitor while being diuresed  -Renal function improved down to 1.44 today  -Continue to monitor closely  -Repeat labs in a.m.  -Will appreciate nephrology recommendations     Elevated TSH levels  -T4 is 1.4 currently  -Outpatient follow-up     A-fib  -Currently AV paced on telemetry  -On amiodarone  -Not on AC due to history of GI bleed and thrombocytopenia.     Thrombocytopenia  -Patient's platelets are currently stable at this time  -Will monitor     Hypertension  -Currently blood pressure is controlled      History of  Present Illness:     Per admitting attending:  Patient is a 79-year-old  female with underlying nonischemic cardiomyopathy, presented today to the emergency department for evaluation of progressive weight gain and dyspnea on exertion with orthopnea. CMP showed GFR of 28, which is below her baseline; BUN and creatinine of 45 and 1.80. CBC showed hemoglobin of 9.5, .7, hemoglobin is at baseline. Chest x-ray showed heart failure changes with pulmonary edema. B-natriuretic peptide 2261. EKG showed paced ventricular rhythm. Patient was started on IV Bumex, and she will be admitted to the hospital for further management.       Disposition: Home Health Care Services    Discharge Condition: Fair    Discharge Medications:   Current Discharge Medication List        CONTINUE these medications which have CHANGED    Details   bumetanide 2 MG Oral Tab Take 1 tablet (2 mg total) by mouth daily.  Qty: 30 tablet, Refills: 0           CONTINUE these medications which have NOT CHANGED    Details   alendronate 70 MG Oral Tab Take 1 tablet (70 mg total) by mouth once a week.  Qty: 12 tablet, Refills: 3      methotrexate 2.5 MG Oral Tab TAKE 6 TABLETS BY MOUTH 1 TIME A WEEK  Qty: 77 tablet, Refills: 0    Associated Diagnoses: Rheumatoid arthritis involving multiple sites with positive rheumatoid factor ([facility])      HYDROcodone-acetaminophen (NORCO)  MG Oral Tab Take 1 tablet by mouth every 6 (six) hours as needed for Pain.  Qty: 60 tablet, Refills: 0    Associated Diagnoses: Bilateral posterior neck pain; Rheumatoid arthritis involving multiple sites with positive rheumatoid factor ([facility]); Lumbar degenerative disc disease; Rheumatoid arthritis of hand, unspecified laterality, unspecified whether rheumatoid factor present ([facility])      gabapentin 300 MG Oral Cap Take 1 capsule (300 mg total) by mouth 2 (two) times daily.  Qty: 60 capsule, Refills: 0      lidocaine-menthol 4-1 % External Patch Place 1 patch onto the  skin daily.  Qty: 30 patch, Refills: 0      isosorbide dinitrate 20 MG Oral Tab Take 1 tablet (20 mg total) by mouth TID (Nitrates).  Qty: 90 tablet, Refills: 0      mirtazapine 7.5 MG Oral Tab Take 1 tablet (7.5 mg total) by mouth nightly.  Qty: 30 tablet, Refills: 0      PANTOPRAZOLE 40 MG Oral Tab EC TAKE 1 TABLET(40 MG) BY MOUTH TWICE DAILY BEFORE MEALS  Qty: 180 tablet, Refills: 0      CARVEDILOL 3.125 MG Oral Tab Take 1 tablet (3.125 mg total) by mouth 2 (two) times daily with meals.  Qty: 60 tablet, Refills: 1      SACUBITRIL-VALSARTAN 49-51 MG Oral Tab Take 1 tablet by mouth 2 (two) times daily.  Qty: 60 tablet, Refills: 1      folic acid 800 MCG Oral Tab Take 1 tablet (800 mcg total) by mouth daily.  Qty: 90 tablet, Refills: 0      amiodarone 200 MG Oral Tab Take 1 tablet (200 mg total) by mouth daily.      ferrous sulfate 325 (65 FE) MG Oral Tab EC Take 1 tablet (325 mg total) by mouth daily with breakfast.             Total discharge time greater than 30 minutes    Nixon Galan MD  6/21/2024  11:41 AM     Hospital Discharge Diagnoses:  Acute on chronic systolic congestive heart failure    Lace+ Score: 79  59-90 High Risk  29-58 Medium Risk  0-28   Low Risk.    TCM Follow-Up Recommendation:  LACE > 58: High Risk of readmission after discharge from the hospital.

## 2024-06-21 NOTE — CM/SW NOTE
06/21/24 1400   Discharge disposition   Expected discharge disposition Home-Health   Post Acute Care Provider Other  ([facility])   Home services after discharge None   Discharge transportation Private car     The patient received a MDO for discharge.    The patient is reserved with [facility] .    The patient will be transported home by her daughter via private car.    The patient has no questions or concerns at this time.    SW/CM to remain available for support and/or discharge planning.     Lin Borges MSW, LSW  Discharge Planner [id number]

## 2024-06-21 NOTE — PLAN OF CARE
Patient is alert and oriented. Daughter is at the bedside. Patient is medically cleared for discharge back to home. Daughter will provide transport. Discharge instructions were reviewed with the patient and her daughter, all questions were answered. Safety measures are in place.     Problem: Patient Centered Care  Goal: Patient preferences are identified and integrated in the patient's plan of care  Description: Interventions:  - What would you like us to know as we care for you? I'm from home with my   - Provide timely, complete, and accurate information to patient/family  - Incorporate patient and family knowledge, values, beliefs, and cultural backgrounds into the planning and delivery of care  - Encourage patient/family to participate in care and decision-making at the level they choose  - Honor patient and family perspectives and choices  Outcome: Adequate for Discharge     Problem: Patient/Family Goals  Goal: Patient/Family Long Term Goal  Description: Patient's Long Term Goal: Go home    Interventions:  - monitor vs, assess pain, I&O  - See additional Care Plan goals for specific interventions  Outcome: Adequate for Discharge  Goal: Patient/Family Short Term Goal  Description: Patient's Short Term Goal: Manage pain and volume staus    Interventions:   - I&O, DW, IV bumex  - See additional Care Plan goals for specific interventions  Outcome: Adequate for Discharge     Problem: CARDIOVASCULAR - ADULT  Goal: Maintains optimal cardiac output and hemodynamic stability  Description: INTERVENTIONS:  - Monitor vital signs, rhythm, and trends  - Monitor for bleeding, hypotension and signs of decreased cardiac output  - Evaluate effectiveness of vasoactive medications to optimize hemodynamic stability  - Monitor arterial and/or venous puncture sites for bleeding and/or hematoma  - Assess quality of pulses, skin color and temperature  - Assess for signs of decreased coronary artery perfusion - ex. Angina  -  Evaluate fluid balance, assess for edema, trend weights  Outcome: Adequate for Discharge  Goal: Absence of cardiac arrhythmias or at baseline  Description: INTERVENTIONS:  - Continuous cardiac monitoring, monitor vital signs, obtain 12 lead EKG if indicated  - Evaluate effectiveness of antiarrhythmic and heart rate control medications as ordered  - Initiate emergency measures for life threatening arrhythmias  - Monitor electrolytes and administer replacement therapy as ordered  Outcome: Adequate for Discharge     Problem: RESPIRATORY - ADULT  Goal: Achieves optimal ventilation and oxygenation  Description: INTERVENTIONS:  - Assess for changes in respiratory status  - Assess for changes in mentation and behavior  - Position to facilitate oxygenation and minimize respiratory effort  - Oxygen supplementation based on oxygen saturation or ABGs  - Provide Smoking Cessation handout, if applicable  - Encourage broncho-pulmonary hygiene including cough, deep breathe, Incentive Spirometry  - Assess the need for suctioning and perform as needed  - Assess and instruct to report SOB or any respiratory difficulty  - Respiratory Therapy support as indicated  - Manage/alleviate anxiety  - Monitor for signs/symptoms of CO2 retention  Outcome: Adequate for Discharge     Problem: PAIN - ADULT  Goal: Verbalizes/displays adequate comfort level or patient's stated pain goal  Description: INTERVENTIONS:  - Encourage pt to monitor pain and request assistance  - Assess pain using appropriate pain scale  - Administer analgesics based on type and severity of pain and evaluate response  - Implement non-pharmacological measures as appropriate and evaluate response  - Consider cultural and social influences on pain and pain management  - Manage/alleviate anxiety  - Utilize distraction and/or relaxation techniques  - Monitor for opioid side effects  - Notify MD/LIP if interventions unsuccessful or patient reports new pain  - Anticipate increased  pain with activity and pre-medicate as appropriate  Outcome: Adequate for Discharge     Problem: SAFETY ADULT - FALL  Goal: Free from fall injury  Description: INTERVENTIONS:  - Assess pt frequently for physical needs  - Identify cognitive and physical deficits and behaviors that affect risk of falls.  - [place] fall precautions as indicated by assessment.  - Educate pt/family on patient safety including physical limitations  - Instruct pt to call for assistance with activity based on assessment  - Modify environment to reduce risk of injury  - Provide assistive devices as appropriate  - Consider OT/PT consult to assist with strengthening/mobility  - Encourage toileting schedule  Outcome: Adequate for Discharge     Problem: DISCHARGE PLANNING  Goal: Discharge to home or other facility with appropriate resources  Description: INTERVENTIONS:  - Identify barriers to discharge w/pt and caregiver  - Include patient/family/discharge partner in discharge planning  - Arrange for needed discharge resources and transportation as appropriate  - Identify discharge learning needs (meds, wound care, etc)  - Arrange for interpreters to assist at discharge as needed  - Consider post-discharge preferences of patient/family/discharge partner  - Complete POLST form as appropriate  - Assess patient's ability to be responsible for managing their own health  - Refer to Case Management Department for coordinating discharge planning if the patient needs post-hospital services based on physician/LIP order or complex needs related to functional status, cognitive ability or social support system  Outcome: Adequate for Discharge

## 2024-06-24 ENCOUNTER — PATIENT OUTREACH (OUTPATIENT)
Dept: CASE MANAGEMENT | Age: 79
End: 2024-06-24

## 2024-06-24 DIAGNOSIS — J90 PLEURAL EFFUSION, BILATERAL: ICD-10-CM

## 2024-06-24 DIAGNOSIS — Z02.9 ENCOUNTERS FOR UNSPECIFIED ADMINISTRATIVE PURPOSE: Primary | ICD-10-CM

## 2024-06-24 DIAGNOSIS — I50.23 ACUTE ON CHRONIC SYSTOLIC (CONGESTIVE) HEART FAILURE (HCC): ICD-10-CM

## 2024-06-24 DIAGNOSIS — I50.9 ACUTE ON CHRONIC CONGESTIVE HEART FAILURE, UNSPECIFIED HEART FAILURE TYPE (HCC): ICD-10-CM

## 2024-06-24 PROCEDURE — 1111F DSCHRG MED/CURRENT MED MERGE: CPT

## 2024-06-24 PROCEDURE — 1159F MED LIST DOCD IN RCRD: CPT

## 2024-06-24 NOTE — PROGRESS NOTES
Initial Post Discharge Follow Up   Discharge Date: 6/21/24  Contact Date: 6/24/2024    Consent Verification:  Assessment Completed With: Patient  HIPAA Verified?  Yes    Discharge Dx:   Acute on chronic congestive heart failure, unspecified heart failure type   Pleural effusion, bilateral   Acute on chronic systolic (congestive) heart failure      General:   How have you been since your discharge from the hospital? Patient states that she is starting to feel better today. Patient denies fever/chills, no shortness of breath, no cough, no chest pain, no abdominal pain, no nausea/vomiting.  Patient states Patient reports that the stabbing pain in the back of her head radiates from the back of her neck up to the back of her head. Patient feels this is from her cervical radiculopathy. Patient states she did not weigh this morning, however, states that the swelling in her lower extremities is gone. NCM explained why it is important to weigh daily and keep a record of the weights to share with her doctors. NCM also instructed patient to let her doctor know if she gains 2-3 lbs overnight or 5 lbs in a week, so her medications can be adjusted if needed. NCM instructed patient to change positions frequently, walk as tolerated, rest when needed, stay hydrated and continue to take up to ten deep breaths an hour while awake, or if watching television take a deep breath with every commercial. NCM reviewed discharge instructions, medications, S&S of infection/blood clots with patient, she verbalized understanding of these. Patient denies any further questions or needs at this time.  Do you have any pain since discharge?  Yes  Where: Patient reports a stabbing pain in the back of her head which comes and goes, it is not a headache, it is a sharp stabbing pain.   Rating on pain scale 1-10, 10 being the worst pain you have ever experienced, what is current pain: not rated.  Alleviating factors: Pain medication as needed  Aggravating  factors: when the stabbing pain occurs, patient states she cannot turn her head side to side or lift her head.   Is the pain manageable at home? Yes  When you were leaving the hospital were your discharge instructions reviewed with you? Yes  How well were your discharge instructions explained to you?   On a scale of 1-5   1- Very Poor and 5- Very well   Very Well  Do you have any questions about your discharge instructions?  No  Before leaving the hospital was your diagnoses explained to you? Yes  Do you have any questions about your diagnoses? No  Are you able to perform normal daily activities of living as you have prior to your hospital stay (dressing, bathing, ambulating to the bathroom, etc)? yes  (NCM) Was patient given a different diet per AVS? yes  If so, which diet?  Low sodium diet with a fluid restriction  Are there any barriers to following this diet? no    Medications:   CHANGE how you take:  bumetanide (Bumex)  Review your updated medication list below.  Current Outpatient Medications   Medication Sig Dispense Refill    bumetanide 2 MG Oral Tab Take 1 tablet (2 mg total) by mouth daily. 30 tablet 0    HYDROcodone-acetaminophen (NORCO)  MG Oral Tab Take 1 tablet by mouth every 6 (six) hours as needed for Pain. 21 tablet 0    alendronate 70 MG Oral Tab Take 1 tablet (70 mg total) by mouth once a week. 12 tablet 3    methotrexate 2.5 MG Oral Tab TAKE 6 TABLETS BY MOUTH 1 TIME A WEEK 77 tablet 0    gabapentin 300 MG Oral Cap Take 1 capsule (300 mg total) by mouth 2 (two) times daily. 60 capsule 0    lidocaine-menthol 4-1 % External Patch Place 1 patch onto the skin daily. 30 patch 0    isosorbide dinitrate 20 MG Oral Tab Take 1 tablet (20 mg total) by mouth TID (Nitrates). 90 tablet 0    mirtazapine 7.5 MG Oral Tab Take 1 tablet (7.5 mg total) by mouth nightly. 30 tablet 0    PANTOPRAZOLE 40 MG Oral Tab EC TAKE 1 TABLET(40 MG) BY MOUTH TWICE DAILY BEFORE MEALS 180 tablet 0    CARVEDILOL 3.125 MG Oral  Tab Take 1 tablet (3.125 mg total) by mouth 2 (two) times daily with meals. 60 tablet 1    SACUBITRIL-VALSARTAN 49-51 MG Oral Tab Take 1 tablet by mouth 2 (two) times daily. 60 tablet 1    folic acid 800 MCG Oral Tab Take 1 tablet (800 mcg total) by mouth daily. 90 tablet 0    amiodarone 200 MG Oral Tab Take 1 tablet (200 mg total) by mouth daily.      ferrous sulfate 325 (65 FE) MG Oral Tab EC Take 1 tablet (325 mg total) by mouth daily with breakfast.       Were there any changes to your current medication(s) noted on the AVS? Yes  If so, were these medication changes discussed with you prior to leaving the hospital? Yes  If a new medication was prescribed:    Was the new medication's purpose & side effects reviewed? Yes  Do you have any questions about your new medication? No  Did you  your discharge medications when you left the hospital? Yes  Let's go over your medications together to make sure we are not missing anything. Medications Reviewed  Are there any reasons that keep you from taking your medication as prescribed? No  Are you having any concerns with constipation? No    Discharge medications reviewed/discussed/and reconciled against outpatient medications with patient.  Any changes or updates to medications sent to PCP.  Patient Acknowledged     Referrals/orders at D/C:  Referrals/orders placed at D/C? yes  What services:   Home health, PT, and OT   (If HH was ordered) Has HH been set up?  Yes    If Yes: With Whom: [facility]   [phone]    DME ordered at D/C? No      Discharge orders, AVS reviewed and discussed with patient. Any changes or updates to orders sent to PCP.  Patient Acknowledged      SDOH:   Transportation:    Transportation Needs: No Transportation Needs (6/18/2024)    Transportation Needs     Lack of Transportation: No     Car Seat: Not on file       Financial Strain:    Financial Resource Strain: Low Risk  (6/4/2024)    Financial Resource Strain     Difficulty of  Paying Living Expenses: Not very hard     Med Affordability: No       Diagnosis specifics:   Things for You to Remember:  1. An appointment has been made for you to see your doctor or healthcare provider within 7 days of hospital discharge.  It is important that you attend this appointment to make sure your symptoms are under control.  2. Your recommended sodium intake is 0243-1357 mg daily  3. Limit your fluid intake to no more than 2 liters or 64 ounces per day  4. Some exercise and activity is important to help keep your heart functioning and strong. Unless instructed not to exercise, you may walk at a slow to moderate pace for 10-15 minutes 2-3 days per week to start.   Pace your activity to prevent shortness of breath or fatigue. Stop exercise if you develop chest pain, lightheadedness, or significant shortness of breath.  Call Your Cardiologist If:  You gain 2 pounds overnight or 3-4 pounds in 3-5 days  You have more difficulty breathing  You are getting more tired with normal activity  You are more short of breath lying down, or awaken at night short of breath  You have swelling of your feet or legs  You urinate less often during the day and more often at night  You have cramps in your legs  You have blurred vision or see yellowish-green halos around objects of lights  Go to the Emergency Room If:  You have pain or tightness in your chest  You are extremely short of breath  You are coughing up pink-frothy mucus  You are traveling and develop symptoms of worsening heart failure  Additional Resources:  If you have questions or concerns about heart failure management, call your cardiologist.    Follow up appointments:      Your appointments       Date & Time Appointment Department ([place])    Jun 25, 2024 3:00 PM CDT Exam - Established with Eyad Arvizu MD [facility] ([facility])        Jun 26, 2024 10:15 AM CDT Exam - Established with Felicitas Lee,   [facility])        Jul 01, 2024 11:00 AM CDT [facility] with Davina Wright NP [facility] ([facility])              [facility]  [facility]  [address]  [place] [address]  [phone] [facility]  [address]  [facility] [address]  [phone] [facility]  [address]  [place] [address]  [phone]            TCC  Was TCC ordered: No      PCP (If no TCC appointment)  Does patient already have a PCP appointment scheduled? No  NCM Scheduled PCP office TCM appointment with patient on 6/2/9/2024 at 12:20 pm, first appointment available.      Specialist    Does the patient have any other follow up appointment(s) needing to be scheduled? No  Does the patient need assistance scheduling appointment(s): No  Future Appointments   Date Time Provider Department Center   6/25/2024  3:00 PM Eyad Arvizu MD [id number]   6/26/2024 10:15 AM Felicitas Lee DO PM&R [facility]   6/29/2024 12:20 PM [facility]   7/1/2024 11:00 AM Davina Wright NP [facility]       Is there any reason as to why you cannot make your appointment(s)?  No     Needs post D/C:   Now that you are home, are there any needs or concerns you need addressed before your next visit with your PCP?  (DME, meds, questions, etc.): No    Interventions by NCM:   NCM reviewed medications, discharge instructions, S&S of infection/blood clots. Patient instructed to report any new or worsening symptoms, when to call the doctor and when to call 911. Patient verbalized understanding of these. NCM instructed patient to call PCP with any  questions or needs, she states she will.      CCM referral placed:    Yes    BOOK BY DATE: 7/5/2024

## 2024-06-24 NOTE — PROGRESS NOTES
NCM placed call to patient for TCM, LM requesting a call back to [phone] with a condition update.    Discharge Dx:   Acute on chronic congestive heart failure, unspecified heart failure type   Pleural effusion, bilateral  Acute on chronic systolic (congestive) heart failure

## 2024-06-24 NOTE — PAYOR COMM NOTE
--------------  DISCHARGE REVIEW    Payor: [employer]  Subscriber #:  [id number]  Authorization Number: [id number]    Admit date: 24  Admit time:   4:20 PM  Discharge Date: 2024  3:44 PM     Admitting Physician: Azul Sahni MD  Attending Physician:  No att. providers found  Primary Care Physician: Johnathon Rodriguez DO          Discharge Summary Notes        Discharge Summary signed by Nixon Galan MD at 2024  8:48 AM       Author: Nixon Galan MD Specialty: HOSPITALIST, Internal Medicine Author Type: Physician    Filed: 2024  8:48 AM Date of Service: 2024 11:41 AM Status: Signed    : Nixon Galan MD (Physician)         [facility]  part of [facility]     Discharge Summary    Margaret Silverio Patient Status:  Inpatient    3/14/1945 MRN [id number]   Location [facility] 3W/SW Attending Nixon Galan MD   Hosp Day # 3 PCP Johnathon Rodriguez DO     Date of Admission: 2024    Date of Discharge: 2024.    Admitting Diagnosis: Pleural effusion, bilateral [J90]  Acute on chronic congestive heart failure, unspecified heart failure type (HCC) [I50.9]    Discharge Diagnosis:   Patient Active Problem List   Diagnosis    Altered mental status    Altered mental status, unspecified altered mental status type    Seizure (HCC)    Lactic acidosis    Leukocytosis    Acute GI bleeding    GEORGE (acute kidney injury) (HCC)    Thrombocytopenia (HCC)    Metabolic acidosis    Atrial fibrillation, chronic (HCC)    Sepsis due to urinary tract infection (HCC)    Sepsis due to Escherichia coli (HCC)    ABLA (acute blood loss anemia)    Melena    Atrial fibrillation with rapid ventricular response (HCC)    Acute chest pain    Acute on chronic congestive heart failure, unspecified heart failure type (HCC)    Pleural effusion    ACC/AHA stage B systolic heart failure (HCC)    Congestive heart failure (HCC)    Coronary  arteriosclerosis    Essential (primary) hypertension    Mitral valve stenosis    Rheumatoid arthritis (HCC)    Stage 3 chronic kidney disease (HCC)    Atrial flutter (HCC)    Acute on chronic HFrEF (heart failure with reduced ejection fraction) ([facility])    Dyspnea, unspecified type    Hypothermia, initial encounter    Anticoagulated    Coagulopathy (HCC)    SIRS (systemic inflammatory response syndrome) ([facility])    Anemia due to stage 3 chronic kidney disease (HCC)    Anemia due to GI blood loss    Anemia of chronic disease    Lymphopenia    Pancytopenia ([facility])    Goals of care, counseling/discussion    Advance care planning    Palliative care by specialist    Acute deep vein thrombosis (DVT) of left upper extremity (HCC)    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HCC)    Cardiogenic shock (HCC)    HFrEF (heart failure with reduced ejection fraction) ([facility])    Anemia    Acute kidney injury (HCC)    Iron deficiency anemia, unspecified iron deficiency anemia type    Weakness generalized    Acute kidney insufficiency    Hyperkalemia    Acute respiratory failure with hypoxia ([facility])    Cervical pain    Cervical radiculopathy    Pleural effusion, bilateral    Acute on chronic systolic (congestive) heart failure ([facility])       Reason for Admission:     Acute on chronic congestive heart failure    Physical Exam:     General: Patient is alert and oriented x3 does not appear to be in acute distress at this time  HEENT: EOMI PERRLA, atraumatic normocephalic  Cardiac: S1-S2 appreciated  Lungs: Good air entry bilaterally clear to auscultation  Abdomen: Soft nontender nondistended positive bowel sounds  Ext: Peripheral pulses are positive  Neuro: No focal deficits noted  Psych: Normal mood  Skin: No rashes noted  MSK: Full range of motion intact      Hospital Course:     Acute on chronic systolic congestive heart failure  -Appreciate cardiology recommendations  -Currently being diuresed with IV Bumex  -Monitor strict I's and O's  -Echo ordered will  follow-up  -The patient's BNP actually increased from 2002 almost 4000 today.  Chest x-ray reviewed  -Will continue to monitor closely     Acute on chronic kidney injury  -Continue to monitor while being diuresed  -Renal function improved down to 1.44 today  -Continue to monitor closely  -Repeat labs in a.m.  -Will appreciate nephrology recommendations     Elevated TSH levels  -T4 is 1.4 currently  -Outpatient follow-up     A-fib  -Currently AV paced on telemetry  -On amiodarone  -Not on AC due to history of GI bleed and thrombocytopenia.     Thrombocytopenia  -Patient's platelets are currently stable at this time  -Will monitor     Hypertension  -Currently blood pressure is controlled      History of Present Illness:     Per admitting attending:  Patient is a 79-year-old  female with underlying nonischemic cardiomyopathy, presented today to the emergency department for evaluation of progressive weight gain and dyspnea on exertion with orthopnea. CMP showed GFR of 28, which is below her baseline; BUN and creatinine of 45 and 1.80. CBC showed hemoglobin of 9.5, .7, hemoglobin is at baseline. Chest x-ray showed heart failure changes with pulmonary edema. B-natriuretic peptide 2261. EKG showed paced ventricular rhythm. Patient was started on IV Bumex, and she will be admitted to the hospital for further management.       Disposition: Home Health Care Services    Discharge Condition: Fair    Discharge Medications:   Current Discharge Medication List        CONTINUE these medications which have CHANGED    Details   bumetanide 2 MG Oral Tab Take 1 tablet (2 mg total) by mouth daily.  Qty: 30 tablet, Refills: 0           CONTINUE these medications which have NOT CHANGED    Details   alendronate 70 MG Oral Tab Take 1 tablet (70 mg total) by mouth once a week.  Qty: 12 tablet, Refills: 3      methotrexate 2.5 MG Oral Tab TAKE 6 TABLETS BY MOUTH 1 TIME A WEEK  Qty: 77 tablet, Refills: 0    Associated  Diagnoses: Rheumatoid arthritis involving multiple sites with positive rheumatoid factor (HCC)      HYDROcodone-acetaminophen (NORCO)  MG Oral Tab Take 1 tablet by mouth every 6 (six) hours as needed for Pain.  Qty: 60 tablet, Refills: 0    Associated Diagnoses: Bilateral posterior neck pain; Rheumatoid arthritis involving multiple sites with positive rheumatoid factor (HCC); Lumbar degenerative disc disease; Rheumatoid arthritis of hand, unspecified laterality, unspecified whether rheumatoid factor present (HCC)      gabapentin 300 MG Oral Cap Take 1 capsule (300 mg total) by mouth 2 (two) times daily.  Qty: 60 capsule, Refills: 0      lidocaine-menthol 4-1 % External Patch Place 1 patch onto the skin daily.  Qty: 30 patch, Refills: 0      isosorbide dinitrate 20 MG Oral Tab Take 1 tablet (20 mg total) by mouth TID (Nitrates).  Qty: 90 tablet, Refills: 0      mirtazapine 7.5 MG Oral Tab Take 1 tablet (7.5 mg total) by mouth nightly.  Qty: 30 tablet, Refills: 0      PANTOPRAZOLE 40 MG Oral Tab EC TAKE 1 TABLET(40 MG) BY MOUTH TWICE DAILY BEFORE MEALS  Qty: 180 tablet, Refills: 0      CARVEDILOL 3.125 MG Oral Tab Take 1 tablet (3.125 mg total) by mouth 2 (two) times daily with meals.  Qty: 60 tablet, Refills: 1      SACUBITRIL-VALSARTAN 49-51 MG Oral Tab Take 1 tablet by mouth 2 (two) times daily.  Qty: 60 tablet, Refills: 1      folic acid 800 MCG Oral Tab Take 1 tablet (800 mcg total) by mouth daily.  Qty: 90 tablet, Refills: 0      amiodarone 200 MG Oral Tab Take 1 tablet (200 mg total) by mouth daily.      ferrous sulfate 325 (65 FE) MG Oral Tab EC Take 1 tablet (325 mg total) by mouth daily with breakfast.             Total discharge time greater than 30 minutes    Nixon Galan MD  6/21/2024  11:41 AM     Hospital Discharge Diagnoses:  Acute on chronic systolic congestive heart failure    Lace+ Score: 79  59-90 High Risk  29-58 Medium Risk  0-28   Low Risk.    TCM Follow-Up Recommendation:  LACE > 58:  High Risk of readmission after discharge from the hospital.       Electronically signed by Nixon Galan MD on 6/23/2024  8:48 AM         REVIEWER COMMENTS

## 2024-06-25 ENCOUNTER — OFFICE VISIT (OUTPATIENT)
Dept: NEPHROLOGY | Facility: CLINIC | Age: 79
End: 2024-06-25

## 2024-06-25 VITALS
DIASTOLIC BLOOD PRESSURE: 71 MMHG | SYSTOLIC BLOOD PRESSURE: 135 MMHG | HEIGHT: 64 IN | BODY MASS INDEX: 24 KG/M2 | HEART RATE: 60 BPM

## 2024-06-25 DIAGNOSIS — D64.9 ANEMIA, UNSPECIFIED TYPE: Primary | ICD-10-CM

## 2024-06-25 DIAGNOSIS — N18.32 STAGE 3B CHRONIC KIDNEY DISEASE (HCC): ICD-10-CM

## 2024-06-25 PROCEDURE — 99214 OFFICE O/P EST MOD 30 MIN: CPT | Performed by: INTERNAL MEDICINE

## 2024-06-25 PROCEDURE — 3075F SYST BP GE 130 - 139MM HG: CPT | Performed by: INTERNAL MEDICINE

## 2024-06-25 PROCEDURE — 3078F DIAST BP <80 MM HG: CPT | Performed by: INTERNAL MEDICINE

## 2024-06-25 PROCEDURE — 1159F MED LIST DOCD IN RCRD: CPT | Performed by: INTERNAL MEDICINE

## 2024-06-26 ENCOUNTER — TELEPHONE (OUTPATIENT)
Dept: PHYSICAL MEDICINE AND REHAB | Facility: CLINIC | Age: 79
End: 2024-06-26

## 2024-06-26 ENCOUNTER — OFFICE VISIT (OUTPATIENT)
Dept: PHYSICAL MEDICINE AND REHAB | Facility: CLINIC | Age: 79
End: 2024-06-26

## 2024-06-26 VITALS — HEIGHT: 64 IN | BODY MASS INDEX: 23.9 KG/M2 | WEIGHT: 140 LBS | RESPIRATION RATE: 18 BRPM

## 2024-06-26 DIAGNOSIS — M05.79 RHEUMATOID ARTHRITIS INVOLVING MULTIPLE SITES WITH POSITIVE RHEUMATOID FACTOR (HCC): ICD-10-CM

## 2024-06-26 DIAGNOSIS — M54.2 BILATERAL POSTERIOR NECK PAIN: ICD-10-CM

## 2024-06-26 DIAGNOSIS — M06.9 RHEUMATOID ARTHRITIS OF HAND, UNSPECIFIED LATERALITY, UNSPECIFIED WHETHER RHEUMATOID FACTOR PRESENT (HCC): ICD-10-CM

## 2024-06-26 DIAGNOSIS — M47.812 ARTHROPATHY OF CERVICAL FACET JOINT: Primary | ICD-10-CM

## 2024-06-26 DIAGNOSIS — M54.12 CERVICAL RADICULOPATHY: ICD-10-CM

## 2024-06-26 DIAGNOSIS — M51.36 LUMBAR DEGENERATIVE DISC DISEASE: ICD-10-CM

## 2024-06-26 PROCEDURE — 1125F AMNT PAIN NOTED PAIN PRSNT: CPT | Performed by: PHYSICAL MEDICINE & REHABILITATION

## 2024-06-26 PROCEDURE — 1159F MED LIST DOCD IN RCRD: CPT | Performed by: PHYSICAL MEDICINE & REHABILITATION

## 2024-06-26 PROCEDURE — 3008F BODY MASS INDEX DOCD: CPT | Performed by: PHYSICAL MEDICINE & REHABILITATION

## 2024-06-26 PROCEDURE — 1160F RVW MEDS BY RX/DR IN RCRD: CPT | Performed by: PHYSICAL MEDICINE & REHABILITATION

## 2024-06-26 PROCEDURE — 99214 OFFICE O/P EST MOD 30 MIN: CPT | Performed by: PHYSICAL MEDICINE & REHABILITATION

## 2024-06-26 RX ORDER — HYDROCODONE BITARTRATE AND ACETAMINOPHEN 10; 325 MG/1; MG/1
1 TABLET ORAL EVERY 8 HOURS PRN
Qty: 60 TABLET | Refills: 0 | Status: SHIPPED | OUTPATIENT
Start: 2024-06-26 | End: 2024-07-26

## 2024-06-26 NOTE — PROGRESS NOTES
06/25/24        Patient: Margaret Silverio   YOB: 1945   Date of Visit: 6/25/2024       Dear  Dr. Rodriguez, DO,      Thank you for referring Margaret Silverio to my practice.  Please find my assessment and plan below.    As you know she is a 79-year-old female with a history of nonischemic cardiomyopathy with a left ventricular ejection fraction of 15 to 20% associated with grade 4 diastolic dysfunction, status post AICD, atrial fibrillation, status post bioprosthetic valve replacement, rheumatoid arthritis, hypertension, anemia, history of GI bleeds from AVMs who I now had the pleasure of seeing for follow-up of chronic kidney disease stage III.  The patient was rehospitalized in June 18 through June 21, 2024 for acute on chronic congestive heart failure.  Her creatinine at time of admission is 1.80.  It improved to 1.46 but then was 1.64 at time of discharge on June 21, 2024.  Currently patient states she is doing okay.  She is comfortable at rest but does have dyspnea on exertion.  No chest pain.  She is having chronic neck pain and may be getting some type of cortisone injection in her neck with Dr. Lee.  Uses Norco as needed for joint pains.    On physical exam her blood pressure 135/71 with a pulse of 60.  Her neck was supple without JVD.  Lungs were clear to auscultation percussion.  Heart revealed an irregular irregular rhythm.  Her abdomen was soft, flat, nontender without organomegaly, masses or bruits.  Extremities revealed no edema.    I told the patient and her  that overall her volume status appears to be reasonably acceptable.  She does have  a significant cardiomyopathy and will always be at risk for cardiorenal syndrome.  Currently on Bumex 2 mg daily.  Also on Entresto.  Reinforced low-salt diet.  Avoid nonsteroidals.  She is scheduled to see cardiology next week.  She will repeat a CBC and renal panel before upcoming visit.  Will also check iron studies.  If acceptable  will be a candidate for Aranesp.  If stable will continue quarterly.  Will see again in 3 to 6 months depending upon clinical course.    Thank you again for allowing me to participate in the care of your patient.  If you have any questions please feel free to call.               Sincerely,   Eyad Arvizu MD   [facility], [facility] [place]  [address]  [facility] [address]    Document electronically generated by:  Eyad Arvizu MD

## 2024-06-26 NOTE — TELEPHONE ENCOUNTER
Action taken:  [x] Rescheduled on Epic to 7/8/24  [x] Rescheduled on Surgical Change Case Request  [x] Verified authorization dates

## 2024-06-26 NOTE — PATIENT INSTRUCTIONS
Please repeat your kidney blood test just before your upcoming visit with cardiology next week.  Orders are in the computer.

## 2024-06-26 NOTE — TELEPHONE ENCOUNTER
Patient no longer taking Eliquis.  Patient has been scheduled for Left C3, C4 and C5 diagnostic medial branch block injection under fluoroscopy guidance on 7/1/24 at the [facility] with Dr. Lee.   Anesthesia type:  Local  Please note: The [facility] will call the business day prior to discuss the exact time/arrival and additional instructions for your appointment.  Patient was advised that if he/she does receive the covid vaccine it needs to be at least 2 weeks before or after the injection.  Medications and allergies reviewed.  Educated to hold NSAIDS (Aleve, Ibuprofen, Motrin, Advil) and anti-inflammatories (Meloxicam, Naproxen, Diclofenac, Celebrex) and for cervical injections must hold Multi-Vitamins, Vitamin E, Fish Oil/Omega-3.  If patient is receiving MAC/IVCS, weight loss oral/injectable medications will need to be held for 7 days prior to injection.  Patient informed to fast 8 hours prior to procedure and 10-12 hours prior to procedure with IVCS/MAC if patient is on a weight loss medication.   If on blood thinner, clearance has been received and approved to hold this medication by provider.   Patient informed of [facility]'s  policy:  he/she will need a  to and from procedure and must be on site for their entirety of their visit, if their ride is unable to the procedure will be cancelled.   [facility] is located in the [facility] 1st floor [address] [place] [address].   may park in the yellow/purple parking lot.  Patient verbalized understanding and agrees with plan.  Scheduled in Epic: Yes  Scheduled in Surgical Case: Yes  Follow up appointment made: NOV: Visit date not found

## 2024-06-26 NOTE — TELEPHONE ENCOUNTER
Initiated authorization for Left C3, C4 and C5 diagnostic medial branch block injection under fluoroscopy guidance CPT 22265, 15778 dx:M47.812 to be done at [facility]  Status: Notification or Prior Authorization is not required for the requested services valid 6/26/24-9/24/24  You are not required to submit a notification/prior authorization based on the information provided. If you have general questions about the prior authorization requirements, visit [email or web address] > Clinician Resources > Advance and Admission Notification Requirements. The number above acknowledges your notification. Please write this reference number down for future reference. If you would like to request an organization determination, please call us at [phone].  Decision ID #: [id number]

## 2024-06-26 NOTE — PROGRESS NOTES
[facility]  OFFICE FOLLOW UP EVALUATION      HISTORY OF PRESENT ILLNESS:     Chief Complaint   Patient presents with    Follow - Up     Returning frank LOV 5/2/24. Recently seen in ED, Hospital Discharge Diagnoses:  Acute on chronic systolic congestive heart failure. Today, reports symptoms continue and are persistent. Reports her pain radiates into the head with headaches. Uses norco as directed. CPL 5/10.       Patient is following up for sided neck pain.  Pain is localized in the upper and mid cervical spine in the axial cervical spine with some radiation to the head and ear.  She was recently hospitalized for CHF exacerbation.  She has noted improvement in her fluid status.  She rates pain to be 5 out of 10 is constant and affects her ability to function.  She has difficult time with rotation of her cervical spine.  She has seen neurosurgery recently as well and there are no plans for any surgical intervention.  She continues to take Norco 10 mg at least twice a day and oftentimes up to 3 times a day.  She finds this to be helpful in improving her symptoms.  He also gets headaches as a result of this pain.    PHYSICAL EXAM:   Resp 18   Ht 64\"   Wt 140 lb (63.5 kg)   BMI 24.03 kg/m²     CERVICAL SPINE:  Inspection: no erythema, swelling, or obvious deformity  Palpation: no ttp over spinous process.  Tenderness to palpation of the cervical facet joints, paraspinals and upper traps bilaterally with trigger points in these areas noted  ROM: Severely restricted in all planes and pain worsened with forward flexion extension and sidebending rotation bilaterally  Strength: 5/5 in upper extremities bilaterally.  Distally in the hands, due to deformity of the hands from contractures from rheumatoid arthritis she has weakness and inability to make a fist with  strength  Sensation: Intact to light touch in all dermatomes of the bilateral upper extremities  Reflexes:  3/4 at C5, C6, C7 bilaterally  Facet loading: Positive  Spurling Test: Positive for radicular symptoms down either extremity bilaterally  Elias's: Negative  Clonus: Negative  Patellar reflex: +1    IMAGING:     MRI cervical spine completed on 2/13/2023     I personally reviewed MRI imaging which is notable for 5 mm anterolisthesis of C4 relative to C5 with degenerative changes of the facet joint and severe facet arthropathy at this level.  Additionally, there are degenerative changes at multiple different cervical levels with varying degrees of foraminal stenosis with moderate foraminal stenosis left-sided at C6-7.  There is no significant spinal stenosis at any level.    All imaging results were reviewed and discussed with patient.      ASSESSMENT/PLAN:     1. Arthropathy of cervical facet joint    2. Cervical radiculopathy    3. Bilateral posterior neck pain    4. Rheumatoid arthritis involving multiple sites with positive rheumatoid factor (HCC)    5. Lumbar degenerative disc disease    6. Rheumatoid arthritis of hand, unspecified laterality, unspecified whether rheumatoid factor present (HCC)        Margaret Silverio is a 79 year old female following up for sided neck pain with cervical facet joint syndrome and instability in the cervical spine left-sided.  She has cervical radiculopathy as result as well.  I have recommended a medial branch block diagnostic procedure to see if this would help and if so we could proceed with radiofrequency ablation in the future.  I have advised her to continue Norco 10 mg every 8 as needed but highly encouraged her to take it no more than twice daily.  She has signed a pain contract today.  She will follow-up with me after the diagnostic procedure and our goal will be to decrease narcotic usage and improve her pain and functional status.  There are no plans for any surgical intervention at this time.  I have recommended she continue the topical lidocaine patch as needed as  well.      The patient verbalized understanding with the plan and was in agreement. All questions/concerns were addressed and there were no barriers to learning.  Please note Dragon dictation software was used to dictate this note and may result in inadvertent typos.    Felicitas Lee DO, FAAPMR & CAQSM  Physical Medicine and Rehabilitation  Sports and Spine Medicine    PAST MEDICAL HISTORY:     Past Medical History:    Acute kidney insufficiency    Anemia    Arrhythmia    CHF (congestive heart failure) (HCC)    CKD (chronic kidney disease) stage 3, GFR 30-59 ml/min (HCC)    Deep vein thrombosis (HCC)    Essential hypertension    History of blood transfusion    Rheumatoid arthritis (HCC)    Seizure disorder (HCC)         PAST SURGICAL HISTORY:     Past Surgical History:   Procedure Laterality Date    Colonoscopy N/A 01/17/2024    Dr. Rios    Colonoscopy N/A 1/17/2024    Procedure: COLONOSCOPY;  Surgeon: Zoraida Rios MD;  Location: [facility] ENDOSCOPY    Egd N/A 01/17/2024    Dr. Rios    Other surgical history  2017    Heart Surgery     Other surgical history  2020    Bilateral shoulder replacement          CURRENT MEDICATIONS:     Current Outpatient Medications   Medication Sig Dispense Refill    HYDROcodone-acetaminophen (NORCO)  MG Oral Tab Take 1 tablet by mouth every 8 (eight) hours as needed for Pain. 60 tablet 0    bumetanide 2 MG Oral Tab Take 1 tablet (2 mg total) by mouth daily. 30 tablet 0    alendronate 70 MG Oral Tab Take 1 tablet (70 mg total) by mouth once a week. 12 tablet 3    methotrexate 2.5 MG Oral Tab TAKE 6 TABLETS BY MOUTH 1 TIME A WEEK 77 tablet 0    gabapentin 300 MG Oral Cap Take 1 capsule (300 mg total) by mouth 2 (two) times daily. 60 capsule 0    lidocaine-menthol 4-1 % External Patch Place 1 patch onto the skin daily. 30 patch 0    isosorbide dinitrate 20 MG Oral Tab Take 1 tablet (20 mg total) by mouth TID (Nitrates). 90 tablet 0    mirtazapine 7.5 MG Oral Tab Take 1 tablet (7.5 mg total)  by mouth nightly. 30 tablet 0    PANTOPRAZOLE 40 MG Oral Tab EC TAKE 1 TABLET(40 MG) BY MOUTH TWICE DAILY BEFORE MEALS 180 tablet 0    CARVEDILOL 3.125 MG Oral Tab Take 1 tablet (3.125 mg total) by mouth 2 (two) times daily with meals. 60 tablet 1    SACUBITRIL-VALSARTAN 49-51 MG Oral Tab Take 1 tablet by mouth 2 (two) times daily. 60 tablet 1    folic acid 800 MCG Oral Tab Take 1 tablet (800 mcg total) by mouth daily. 90 tablet 0    amiodarone 200 MG Oral Tab Take 1 tablet (200 mg total) by mouth daily.      ferrous sulfate 325 (65 FE) MG Oral Tab EC Take 1 tablet (325 mg total) by mouth daily with breakfast.           ALLERGIES:     Allergies   Allergen Reactions    Lisinopril Coughing         FAMILY HISTORY:   No family history on file.       SOCIAL HISTORY:     Social History     Socioeconomic History    Marital status:    Tobacco Use    Smoking status: Former     Current packs/day: 0.00     Types: Cigarettes     Quit date: 2014     Years since quitting: 10.4    Smokeless tobacco: Never   Vaping Use    Vaping status: Never Used   Substance and Sexual Activity    Alcohol use: Never    Drug use: Never     Social Determinants of Health     Financial Resource Strain: Low Risk  (6/4/2024)    Financial Resource Strain     Difficulty of Paying Living Expenses: Not very hard     Med Affordability: No   Food Insecurity: No Food Insecurity (6/18/2024)    Food Insecurity     Food Insecurity: Never true   Transportation Needs: No Transportation Needs (6/18/2024)    Transportation Needs     Lack of Transportation: No   Housing Stability: Low Risk  (6/18/2024)    Housing Stability     Housing Instability: No          REVIEW OF SYSTEMS:   A comprehensive 10 point review of systems was completed.  Pertinent positives and negatives noted in the the HPI.      LABS:     Lab Results   Component Value Date     11/27/2022    A1C 5.9 (H) 11/27/2022     Lab Results   Component Value Date    WBC 6.9 06/21/2024    RBC 2.64  (L) 06/21/2024    HGB 8.6 (L) 06/21/2024    HCT 25.8 (L) 06/21/2024    MCV 97.7 06/21/2024    MCH 32.6 06/21/2024    MCHC 33.3 06/21/2024    RDW 17.1 (H) 06/21/2024    PLT 79.0 (L) 06/21/2024     Lab Results   Component Value Date     (H) 06/21/2024    BUN 43 (H) 06/21/2024    BUNCREA 26.2 (H) 06/21/2024    CREATSERUM 1.64 (H) 06/21/2024    ANIONGAP 8 06/21/2024    CA 8.9 06/21/2024    OSMOCALC 301 (H) 06/21/2024    ALKPHO 328 (H) 06/18/2024    AST 23 06/18/2024    ALT 11 06/18/2024    BILT 0.8 06/18/2024    TP 7.7 06/18/2024    ALB 4.1 06/18/2024    GLOBULIN 3.6 (H) 06/18/2024     06/21/2024    K 4.5 06/21/2024     06/21/2024    CO2 25.0 06/21/2024     Lab Results   Component Value Date    PTP 16.5 (H) 02/09/2024    INR 1.25 (H) 02/09/2024     No results found for: \"VITD\", \"QVITD\", \"DICE84GJ\"

## 2024-06-27 ENCOUNTER — TELEPHONE (OUTPATIENT)
Dept: CARDIOLOGY CLINIC | Facility: HOSPITAL | Age: 79
End: 2024-06-27

## 2024-06-27 DIAGNOSIS — I50.9 ACUTE ON CHRONIC CONGESTIVE HEART FAILURE, UNSPECIFIED HEART FAILURE TYPE (HCC): Primary | ICD-10-CM

## 2024-06-28 PROBLEM — R41.841 COGNITIVE COMMUNICATION DEFICIT: Status: ACTIVE | Noted: 2024-01-01

## 2024-06-28 PROBLEM — R26.2 DIFFICULTY IN WALKING, NOT ELSEWHERE CLASSIFIED: Status: ACTIVE | Noted: 2024-03-02

## 2024-06-28 PROBLEM — K21.9 GASTRO-ESOPHAGEAL REFLUX DISEASE WITHOUT ESOPHAGITIS: Status: ACTIVE | Noted: 2024-02-18

## 2024-06-28 PROBLEM — F32.9 MAJOR DEPRESSIVE DISORDER, SINGLE EPISODE, UNSPECIFIED: Status: ACTIVE | Noted: 2024-01-01

## 2024-06-28 PROBLEM — R13.11 DYSPHAGIA, ORAL PHASE: Status: ACTIVE | Noted: 2024-01-01

## 2024-06-28 PROBLEM — R13.11 DYSPHAGIA, ORAL PHASE: Status: ACTIVE | Noted: 2024-03-03

## 2024-06-28 PROBLEM — R26.2 DIFFICULTY IN WALKING, NOT ELSEWHERE CLASSIFIED: Status: ACTIVE | Noted: 2024-01-01

## 2024-06-28 PROBLEM — I42.9 CARDIOMYOPATHY, UNSPECIFIED (HCC): Status: ACTIVE | Noted: 2024-01-01

## 2024-06-28 PROBLEM — K21.9 GASTRO-ESOPHAGEAL REFLUX DISEASE WITHOUT ESOPHAGITIS: Status: ACTIVE | Noted: 2024-01-01

## 2024-06-28 PROBLEM — R41.841 COGNITIVE COMMUNICATION DEFICIT: Status: ACTIVE | Noted: 2024-03-03

## 2024-06-28 PROBLEM — I42.9 CARDIOMYOPATHY, UNSPECIFIED (HCC): Status: ACTIVE | Noted: 2024-03-01

## 2024-06-28 PROBLEM — F32.9 MAJOR DEPRESSIVE DISORDER, SINGLE EPISODE, UNSPECIFIED: Status: ACTIVE | Noted: 2024-03-01

## 2024-06-29 ENCOUNTER — OFFICE VISIT (OUTPATIENT)
Dept: FAMILY MEDICINE CLINIC | Facility: CLINIC | Age: 79
End: 2024-06-29

## 2024-06-29 VITALS
HEART RATE: 60 BPM | SYSTOLIC BLOOD PRESSURE: 126 MMHG | HEIGHT: 64 IN | DIASTOLIC BLOOD PRESSURE: 82 MMHG | WEIGHT: 134 LBS | OXYGEN SATURATION: 98 % | BODY MASS INDEX: 22.88 KG/M2 | TEMPERATURE: 98 F

## 2024-06-29 DIAGNOSIS — Z09 HOSPITAL DISCHARGE FOLLOW-UP: Primary | ICD-10-CM

## 2024-06-29 DIAGNOSIS — G89.29 OTHER CHRONIC PAIN: ICD-10-CM

## 2024-06-29 DIAGNOSIS — R06.09 DOE (DYSPNEA ON EXERTION): ICD-10-CM

## 2024-06-29 DIAGNOSIS — I50.23 ACUTE ON CHRONIC HFREF (HEART FAILURE WITH REDUCED EJECTION FRACTION) (HCC): Chronic | ICD-10-CM

## 2024-06-29 DIAGNOSIS — I10 ESSENTIAL (PRIMARY) HYPERTENSION: ICD-10-CM

## 2024-06-29 DIAGNOSIS — M06.00 RHEUMATOID ARTHRITIS WITH NEGATIVE RHEUMATOID FACTOR, INVOLVING UNSPECIFIED SITE (HCC): ICD-10-CM

## 2024-06-29 RX ORDER — SPIRONOLACTONE 25 MG/1
TABLET ORAL
COMMUNITY
Start: 2024-06-28

## 2024-06-29 NOTE — PATIENT INSTRUCTIONS
Medication reviewed and renewed where needed and appropriate.  Comply with medications.  Monitor blood pressures and record at home. Limit salt intake.  Keep specialist appointments.  Application to be made to [facility] regarding medicinal marijuana for chronic pain/advanced rheumatoid arthritis.

## 2024-07-01 ENCOUNTER — LAB ENCOUNTER (OUTPATIENT)
Dept: LAB | Facility: HOSPITAL | Age: 79
End: 2024-07-01
Attending: NURSE PRACTITIONER
Payer: MEDICARE

## 2024-07-01 ENCOUNTER — OFFICE VISIT (OUTPATIENT)
Dept: CARDIOLOGY CLINIC | Facility: HOSPITAL | Age: 79
End: 2024-07-01
Attending: NURSE PRACTITIONER
Payer: MEDICARE

## 2024-07-01 VITALS
RESPIRATION RATE: 18 BRPM | SYSTOLIC BLOOD PRESSURE: 147 MMHG | HEART RATE: 64 BPM | OXYGEN SATURATION: 94 % | WEIGHT: 129.63 LBS | BODY MASS INDEX: 22 KG/M2 | DIASTOLIC BLOOD PRESSURE: 70 MMHG

## 2024-07-01 DIAGNOSIS — I07.1 SEVERE TRICUSPID REGURGITATION: ICD-10-CM

## 2024-07-01 DIAGNOSIS — I48.0 PAF (PAROXYSMAL ATRIAL FIBRILLATION) (HCC): ICD-10-CM

## 2024-07-01 DIAGNOSIS — N18.32 ANEMIA DUE TO STAGE 3B CHRONIC KIDNEY DISEASE (HCC): ICD-10-CM

## 2024-07-01 DIAGNOSIS — Z95.810 ICD (IMPLANTABLE CARDIOVERTER-DEFIBRILLATOR), DUAL, IN SITU: ICD-10-CM

## 2024-07-01 DIAGNOSIS — I50.9 ACUTE ON CHRONIC CONGESTIVE HEART FAILURE, UNSPECIFIED HEART FAILURE TYPE (HCC): ICD-10-CM

## 2024-07-01 DIAGNOSIS — Z95.2 S/P MVR (MITRAL VALVE REPLACEMENT): ICD-10-CM

## 2024-07-01 DIAGNOSIS — D63.1 ANEMIA DUE TO STAGE 3B CHRONIC KIDNEY DISEASE (HCC): ICD-10-CM

## 2024-07-01 DIAGNOSIS — N18.32 STAGE 3B CHRONIC KIDNEY DISEASE (HCC): ICD-10-CM

## 2024-07-01 DIAGNOSIS — N18.32 STAGE 3B CHRONIC KIDNEY DISEASE (HCC): Primary | ICD-10-CM

## 2024-07-01 DIAGNOSIS — I50.23 ACUTE ON CHRONIC HFREF (HEART FAILURE WITH REDUCED EJECTION FRACTION) (HCC): Chronic | ICD-10-CM

## 2024-07-01 DIAGNOSIS — D64.9 ANEMIA, UNSPECIFIED TYPE: ICD-10-CM

## 2024-07-01 PROBLEM — J96.01 ACUTE RESPIRATORY FAILURE WITH HYPOXIA (HCC): Status: RESOLVED | Noted: 2024-01-01 | Resolved: 2024-01-01

## 2024-07-01 PROBLEM — N18.4 CKD (CHRONIC KIDNEY DISEASE) STAGE 4, GFR 15-29 ML/MIN (HCC): Chronic | Status: ACTIVE | Noted: 2024-01-01

## 2024-07-01 PROBLEM — N17.9 AKI (ACUTE KIDNEY INJURY) (HCC): Status: RESOLVED | Noted: 2023-04-22 | Resolved: 2024-01-01

## 2024-07-01 PROBLEM — N18.4 CKD (CHRONIC KIDNEY DISEASE) STAGE 4, GFR 15-29 ML/MIN (HCC): Chronic | Status: ACTIVE | Noted: 2024-07-01

## 2024-07-01 PROBLEM — I48.91 ATRIAL FIBRILLATION WITH RAPID VENTRICULAR RESPONSE (HCC): Status: RESOLVED | Noted: 2023-05-02 | Resolved: 2024-01-01

## 2024-07-01 PROBLEM — I48.91 ATRIAL FIBRILLATION WITH RAPID VENTRICULAR RESPONSE (HCC): Status: RESOLVED | Noted: 2023-05-02 | Resolved: 2024-07-01

## 2024-07-01 PROBLEM — J96.01 ACUTE RESPIRATORY FAILURE WITH HYPOXIA (HCC): Status: RESOLVED | Noted: 2024-05-24 | Resolved: 2024-07-01

## 2024-07-01 PROBLEM — N17.9 AKI (ACUTE KIDNEY INJURY) (HCC): Status: RESOLVED | Noted: 2023-04-22 | Resolved: 2024-07-01

## 2024-07-01 LAB
ALBUMIN SERPL-MCNC: 4 G/DL (ref 3.2–4.8)
ANION GAP SERPL CALC-SCNC: 9 MMOL/L (ref 0–18)
ANION GAP SERPL CALC-SCNC: 9 MMOL/L (ref 0–18)
BASOPHILS # BLD AUTO: 0.02 X10(3) UL (ref 0–0.2)
BASOPHILS NFR BLD AUTO: 1.4 %
BNP SERPL-MCNC: >5000 PG/ML
BUN BLD-MCNC: 44 MG/DL (ref 9–23)
BUN BLD-MCNC: 44 MG/DL (ref 9–23)
BUN/CREAT SERPL: 24.9 (ref 10–20)
BUN/CREAT SERPL: 24.9 (ref 10–20)
CALCIUM BLD-MCNC: 9.3 MG/DL (ref 8.7–10.4)
CALCIUM BLD-MCNC: 9.3 MG/DL (ref 8.7–10.4)
CHLORIDE SERPL-SCNC: 108 MMOL/L (ref 98–112)
CHLORIDE SERPL-SCNC: 108 MMOL/L (ref 98–112)
CO2 SERPL-SCNC: 25 MMOL/L (ref 21–32)
CO2 SERPL-SCNC: 25 MMOL/L (ref 21–32)
CREAT BLD-MCNC: 1.77 MG/DL
CREAT BLD-MCNC: 1.77 MG/DL
DEPRECATED HBV CORE AB SER IA-ACNC: 780.8 NG/ML
DEPRECATED RDW RBC AUTO: 59.8 FL (ref 35.1–46.3)
EGFRCR SERPLBLD CKD-EPI 2021: 29 ML/MIN/1.73M2 (ref 60–?)
EGFRCR SERPLBLD CKD-EPI 2021: 29 ML/MIN/1.73M2 (ref 60–?)
EOSINOPHIL # BLD AUTO: 0.13 X10(3) UL (ref 0–0.7)
EOSINOPHIL NFR BLD AUTO: 8.8 %
ERYTHROCYTE [DISTWIDTH] IN BLOOD BY AUTOMATED COUNT: 17.2 % (ref 11–15)
FASTING STATUS PATIENT QL REPORTED: NO
GLUCOSE BLD-MCNC: 100 MG/DL (ref 70–99)
GLUCOSE BLD-MCNC: 100 MG/DL (ref 70–99)
HCT VFR BLD AUTO: 29.2 %
HGB BLD-MCNC: 9.1 G/DL
IMM GRANULOCYTES # BLD AUTO: 0.01 X10(3) UL (ref 0–1)
IMM GRANULOCYTES NFR BLD: 0.7 %
IRON SATN MFR SERPL: 29 %
IRON SERPL-MCNC: 69 UG/DL
LYMPHOCYTES # BLD AUTO: 0.51 X10(3) UL (ref 1–4)
LYMPHOCYTES NFR BLD AUTO: 34.5 %
MCH RBC QN AUTO: 31.3 PG (ref 26–34)
MCHC RBC AUTO-ENTMCNC: 31.2 G/DL (ref 31–37)
MCV RBC AUTO: 100.3 FL
MONOCYTES # BLD AUTO: 0.05 X10(3) UL (ref 0.1–1)
MONOCYTES NFR BLD AUTO: 3.4 %
NEUTROPHILS # BLD AUTO: 0.76 X10 (3) UL (ref 1.5–7.7)
NEUTROPHILS # BLD AUTO: 0.76 X10(3) UL (ref 1.5–7.7)
NEUTROPHILS NFR BLD AUTO: 51.2 %
OSMOLALITY SERPL CALC.SUM OF ELEC: 305 MOSM/KG (ref 275–295)
OSMOLALITY SERPL CALC.SUM OF ELEC: 305 MOSM/KG (ref 275–295)
PHOSPHATE SERPL-MCNC: 3.8 MG/DL (ref 2.4–5.1)
PLATELET # BLD AUTO: 75 10(3)UL (ref 150–450)
PLATELET MORPHOLOGY: NORMAL
PLATELETS.RETICULATED NFR BLD AUTO: 6.6 % (ref 0–7)
POTASSIUM SERPL-SCNC: 4.5 MMOL/L (ref 3.5–5.1)
POTASSIUM SERPL-SCNC: 4.5 MMOL/L (ref 3.5–5.1)
RBC # BLD AUTO: 2.91 X10(6)UL
SODIUM SERPL-SCNC: 142 MMOL/L (ref 136–145)
SODIUM SERPL-SCNC: 142 MMOL/L (ref 136–145)
TIBC SERPL-MCNC: 235 UG/DL (ref 250–425)
TRANSFERRIN SERPL-MCNC: 158 MG/DL (ref 250–380)
VIT B12 SERPL-MCNC: >2000 PG/ML (ref 211–911)
WBC # BLD AUTO: 1.5 X10(3) UL (ref 4–11)

## 2024-07-01 PROCEDURE — 85060 BLOOD SMEAR INTERPRETATION: CPT

## 2024-07-01 PROCEDURE — 83880 ASSAY OF NATRIURETIC PEPTIDE: CPT

## 2024-07-01 PROCEDURE — 99215 OFFICE O/P EST HI 40 MIN: CPT | Performed by: NURSE PRACTITIONER

## 2024-07-01 PROCEDURE — 36415 COLL VENOUS BLD VENIPUNCTURE: CPT

## 2024-07-01 PROCEDURE — 82728 ASSAY OF FERRITIN: CPT

## 2024-07-01 PROCEDURE — 85025 COMPLETE CBC W/AUTO DIFF WBC: CPT

## 2024-07-01 PROCEDURE — 84466 ASSAY OF TRANSFERRIN: CPT

## 2024-07-01 PROCEDURE — 82607 VITAMIN B-12: CPT

## 2024-07-01 PROCEDURE — 83540 ASSAY OF IRON: CPT

## 2024-07-01 PROCEDURE — 80069 RENAL FUNCTION PANEL: CPT

## 2024-07-01 RX ORDER — DAPAGLIFLOZIN 10 MG/1
10 TABLET, FILM COATED ORAL DAILY
Qty: 30 TABLET | Refills: 0 | Status: SHIPPED | OUTPATIENT
Start: 2024-07-01 | End: 2024-07-31

## 2024-07-01 NOTE — PROGRESS NOTES
Subjective:   Margaret Silverio is a 79 year old female who presents for hospital follow up.   She was discharged from [facility] to [facility]  Admission Date: 6/18/24   Discharge Date: 6/21/24  Hospital Discharge Diagnosis: Acute on chronic HFtEF (heart failure with reduced ejection fraction)    Interactive contact within 2 business days post discharge first initiated on Date: 6/24/2024    During the visit, the following was completed:  Obtained and reviewed discharge summary, continuity of care documents, and Hospitalist notes  Reviewed Labs (CBC, CMP)    HPI:     The following is the initial entry from the admitting physician on June 18, 2024:    Per admitting attending:  Patient is a 79-year-old  female with underlying nonischemic cardiomyopathy, presented today to the emergency department for evaluation of progressive weight gain and dyspnea on exertion with orthopnea. CMP showed GFR of 28, which is below her baseline; BUN and creatinine of 45 and 1.80. CBC showed hemoglobin of 9.5, .7, hemoglobin is at baseline. Chest x-ray showed heart failure changes with pulmonary edema. B-natriuretic peptide 2261. EKG showed paced ventricular rhythm. Patient was started on IV Bumex, and she will be admitted to the hospital for further management.     Patient is accompanied today by her  during the post hospitalization follow-up.  The patient appears to be comfortable at rest with respect to general anxieties and also respiratory function.  Patient states that primarily she looks a little better because she was able to sit a little bit longer before I entered the room.  She is still challenged for exertional fatigue, but this time she was able to sit still before our encounter began.    Patient still gets exertional fatigue at home per the patient and her spouse.  Patient is compliant with all recommended medications.    Patient does have the appropriate follow-up  appointments set up.    There will be a need for pulmonary referral as the patient is asking about portable oxygen support for when she is trying to be out and about even if it is about her specialty care appointments.  I did let the patient know that I need for oxygen typically is established before Casi leaves the hospital.  I am going to make available to her a referral to our pulmonary specialist.    History/Other:   Current Medications:  Medication Reconciliation:  I am aware of an inpatient discharge within the last 30 days.  The discharge medication list has been reconciled with the patient's current medication list and reviewed by me.  See medication list for additions of new medication, and changes to current doses of medications and discontinued medications.  Outpatient Medications Marked as Taking for the 6/29/24 encounter (Office Visit) with Johnathon Rodriguez, DO   Medication Sig    spironolactone 25 MG Oral Tab     HYDROcodone-acetaminophen (NORCO)  MG Oral Tab Take 1 tablet by mouth every 8 (eight) hours as needed for Pain.    bumetanide 2 MG Oral Tab Take 1 tablet (2 mg total) by mouth daily.    alendronate 70 MG Oral Tab Take 1 tablet (70 mg total) by mouth once a week.    methotrexate 2.5 MG Oral Tab TAKE 6 TABLETS BY MOUTH 1 TIME A WEEK    gabapentin 300 MG Oral Cap Take 1 capsule (300 mg total) by mouth 2 (two) times daily.    lidocaine-menthol 4-1 % External Patch Place 1 patch onto the skin daily.    isosorbide dinitrate 20 MG Oral Tab Take 1 tablet (20 mg total) by mouth TID (Nitrates).    PANTOPRAZOLE 40 MG Oral Tab EC TAKE 1 TABLET(40 MG) BY MOUTH TWICE DAILY BEFORE MEALS    CARVEDILOL 3.125 MG Oral Tab Take 1 tablet (3.125 mg total) by mouth 2 (two) times daily with meals.    SACUBITRIL-VALSARTAN 49-51 MG Oral Tab Take 1 tablet by mouth 2 (two) times daily.    folic acid 800 MCG Oral Tab Take 1 tablet (800 mcg total) by mouth daily.    amiodarone 200 MG Oral Tab Take 1 tablet (200  mg total) by mouth daily.    ferrous sulfate 325 (65 FE) MG Oral Tab EC Take 1 tablet (325 mg total) by mouth daily with breakfast.       Review of Systems:  GENERAL: Appears to be in no acute distress and no respiratory distress, RAM, generalized fatigue, generalized pain  SKIN: denies any unusual skin lesions  EYES: denies blurred vision or double vision  HEENT: denies nasal congestion, sinus pain or ST  LUNGS: denies shortness of breath with exertion  CARDIOVASCULAR: denies chest pain on exertion or palpitations  GI: denies abdominal pain, denies heartburn, denies diarrhea  MUSCULOSKELETAL: denies pain, normal range of motion of extremities  NEURO: denies headaches, denies dizziness, denies weakness  PSYCHE: denies depression or anxiety  HEMATOLOGIC: denies hx of anemia, or bruising, denies bleeding  ENDOCRINE: denies thyroid history  ALL/ASTHMA: denies hx of allergy or asthma    Objective:   No LMP recorded. Patient is postmenopausal.  Estimated body mass index is 23 kg/m² as calculated from the following:    Height as of this encounter: 5' 4\" (1.626 m).    Weight as of this encounter: 134 lb (60.8 kg).   /82   Pulse 60   Temp 98 °F (36.7 °C) (Temporal)   Ht 5' 4\" (1.626 m)   Wt 134 lb (60.8 kg)   SpO2 98%   BMI 23.00 kg/m²    GENERAL: well developed, well nourished, in no apparent distress  SKIN: no rashes, no suspicious lesions  HEENT: atraumatic, normocephalic, ears and throat are clear  EYES: PERRLA, EOMI, conjunctiva are clear  NECK: supple, no adenopathy, no bruits  CHEST: no chest tenderness  LUNGS: clear to auscultation with the exception of bilateral posterior bibasilar inspiratory rales which could be fluid versus atelectasis.  CARDIO: RRR with 2/6 ALY  GI: good BS's, no masses, HSM or tenderness  MUSCULOSKELETAL:: Gross deformity seen bilateral hands  EXTREMITIES: no cyanosis, clubbing or edema  NEURO: Oriented times three, cranial nerves are intact, motor and sensory are grossly  intact    Assessment & Plan:   1. Hospital discharge follow-up (Primary)  2. Acute on chronic HFrEF (heart failure with reduced ejection fraction) ([facility])  -     Pulmonary Referral - In Network  3. Essential (primary) hypertension  4. RAM (dyspnea on exertion)  -     Pulmonary Referral - In Network  5. Rheumatoid arthritis with negative rheumatoid factor, involving unspecified site ([facility])  6. Other chronic pain    Patient Instructions   Medication reviewed and renewed where needed and appropriate.  Comply with medications.  Monitor blood pressures and record at home. Limit salt intake.  Keep specialist appointments.  Application to be made to [facility] regarding medicinal marijuana for chronic pain/advanced rheumatoid arthritis.      Return in about 6 weeks (around 8/10/2024), or if symptoms worsen or fail to improve.

## 2024-07-01 NOTE — PROGRESS NOTES
[facility]    Margaret Silverio Patient Status:  Outpatient    3/14/1945 MRN [id number]   Location [facility] DO Dr. [address] Dr. Jonny Silverio is a 79 year old female who presents to clinic for assessment and management for hospitalization  for acute on chronic heart failure.     Admitted -24 with increased webb and wt gin. CXR noted pulmonary congestion, BNP 3395. Diuresed with IV bumex and IV milrinone.  EF improved to 30-35% from 15-20 % with severe TR. Consider BiV ICD upgrade as out-pt.    Admitted -6/3/24 with ARF with hypoxemia  to acute CHF and anemia. Required bipap, developed GEORGE after IV diuresis. Transfused PRBC x1.  Home on bumex 1 mg daily and isosorbide dinitrate.     Admitted 5/15-24 with hypotension , GEORGE, chronic anemia.  Improved with IV fluids, acute GI bleed r/o.     Admitted -24 with ARF with hypoxemia, cardiogenic shock and acute on chronic HF.  EF 15%, severe TR. Improved with milrinone drip and IV bumex. 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     Admitted -/24 with Melena,  acute GI bleeding, pneumonia with sepsis and acute on chronic HF.  Eliquis dc'd. Furosemide increased to 40 mg daily.       Admitted  -23 with worsening webb and chest discomfort. Discharged on  after GI bleed due to small AVM in duodenum, s/p clipping .She accidentally stopped eliquis, entresto and lasix.  Pro bnp 42,000, wbc 11.5, troponin negative. Cxr with small bilateral pleural effusions R>L and mil bibasilar opacities. Was also in afib RVR and blood pressure uncontrolled. Improved after IV labetolol, IV furosemide, creatinine elevated to 1.36, cardiology and nephrology consulted. Given venofer x2  for ANGÉLICA. Switched to torsemide 20 mg daily, coreg increased to 25 mg bid, restarted on eliquis 5 mg bid, continued entresto 49/51 mg  bid.     Problem List:  Acute on chronic HFpEF, NYHA class III, Stage D, LVEF 30-35%, improved from 15-20%, Wide-Open TR   Non ischemic cardiomyopathy  Dual chamber ICD, 9/2023.  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 Smith and fatigue with minimal activity.   Denies cp, heart racing, orthopnea, dizziness, bloating, minimal LE edema. Denies falling. Using walker and wheelchair.     Review of Systems:  Constitutional: negative for chills or fever  Respiratory:  negative for cough, hemoptysis and wheezing  Cardiovascular: negative for chest pain, exertional chest pressure/discomfort, near-syncope, orthopnea and palpitations  Gastrointestinal: negative for abdominal pain, diarrhea, melena, nausea and vomiting  Hematologic/lymphatic: negative  Musculoskeletal: negative for muscle weakness. See above     Objective:  Lab Results   Component Value Date/Time    WBC 6.9 06/21/2024 04:59 AM    HGB 8.6 (L) 06/21/2024 04:59 AM    HCT 25.8 (L) 06/21/2024 04:59 AM    PLT 79.0 (L) 06/21/2024 04:59 AM    CREATSERUM 1.64 (H) 06/21/2024 04:59 AM    BUN 43 (H) 06/21/2024 04:59 AM     06/21/2024 04:59 AM    K 4.5 06/21/2024 04:59 AM     06/21/2024 04:59 AM    CO2 25.0 06/21/2024 04:59 AM     (H) 06/21/2024 04:59 AM    CA 8.9 06/21/2024 04:59 AM    ALB 4.1 06/18/2024 01:29 PM    ALKPHO 328 (H) 06/18/2024 01:29 PM    BILT 0.8 06/18/2024 01:29 PM    TP 7.7 06/18/2024 01:29 PM    AST 23 06/18/2024 01:29 PM    ALT 11 06/18/2024 01:29 PM    PTT 35.2 02/09/2024 02:58 PM    INR 1.25 (H) 02/09/2024 02:58 PM    PTP 16.5 (H) 02/09/2024 02:58 PM    T4F 1.4 06/18/2024 01:29 PM    TSH 7.534 (H) 06/18/2024 01:29 PM    LIP 32 01/13/2024 05:36 AM     ESRML 61 (H) 2024 02:30 PM    CRP 5.10 (H) 2024 02:30 PM    MG 1.9 2024 04:59 AM    PHOS 3.9 2024 04:59 AM    B12 >2,000 (H) 2024 06:37 PM    PGLU 106 (H) 2024 09:02 PM       Labs drawn : BMP, CBC, iron studies, results reviewed with patient, independently interpreted results    /70 (BP Location: Right arm)   Pulse 64   Resp 18   Wt 129 lb 9.6 oz (58.8 kg)   SpO2 94%   BMI 22.25 kg/m²       Date  Clinic wt Home wt MCI wt DC wt IV med  PO med   23 141        6/15/24 146 141       24   126      24    140     24 129   ---                             General appearance: alert, appears older than stated age and cooperative  Neck: + JVD > 90 degrees  Lungs: clear to auscultation bilaterally, diminished L base  Heart: S1, S2 normal, +  murmur, click, no rub or gallop, regular rate and irregular rhythm  Abdomen: soft, non-tender; bowel sounds normal; no masses,  mild abdominal distension  Extremities: trace- +1 desirae ankle/pedal  edema  Pulses: 2+ and symmetric  Neurologic: Grossly normal  Measurements: RLE calf- 13\" ankle- 7.75\"     LLE calf- 13.2\" ankle- 7.75\"     Diagnostics:  EK24: AV paced.    Echo: 24:  EF 30-35%, Severe LA and mild R atrial dilation, mild aortic stenosis, bioprosthetic mitral valve with normal function, no significant regurg. Wide open TR. EF improved from last echo    ECHO 2/10/2024:  EF 15-20%, grade 4 DD, severe RVH, , moderate R dysfunction, bilateral atrial dilation, echodensity in LA, normal functioning bioprosthetic Mitral valve,  wide open TR.         R/L Heart cath: 10/2022 LVEDP 14 mmHg, no cad.  RA 11  RV 38/6  PA 40/13 mean 28  PCW 18     CO 4.1/ CI 2.3  SVR 1839/        Education:  Patient and family instructed at length regarding clinic procedures, hours, purpose of clinic visits, sodium restricted diet, low sodium foods, fluid restrictions, daily weights, medication regimen s/s of heart failure  exacerbation and when to call APN/clinic. 50 minutes spent with patient providing counseling, coordination of care and education given. Patient and family receptive.    Assessment:  Devices:   [ x ]ICD  [ ]BIV-ICD  [  ]PPM  [  ]Life Vest  [  ]CardioMEMS    Acute on chronic HF recovered EF, NYHA class III, stage D, dual chamber ICD  -EF improved to from 30-35%,(6/18/24) from 10-15%, with wide open tricuspid regurg  -non ischemic cardiomyopathy and history of tachycardia induced cardiomyopathy  -history of MV replacement in 2017, mild MR  -frequent hospital admission for hypervolemia,requiring milrinone and bumex drip,  switched to bumex 2 mg daily   -diuretics: bumex 2  mg daily  -GDMT: entresto, coreg, spironolactone, isosorbide   - BP high normal   -Renal function overall stable , bun/cr: 44/1.77 <-43/1.64 <-45/1.8 <-37/1.4 <-38/1.5  -pro BNP: > 5000 today, 3899 (6/19/24) <- >5000 (5/24/24) <--18,762 (2/2/24) <-42,900,(2023)   -near euvolemic, webb and fatigue with minimal exertion with intermittent chronic pain with severe RA  -continue same medications  -start farxiga 10 mg daily  - recheck bmp in 1 month at next visit  - follow up with Gail URBANO tomorrow 7/2/24, may consider upgrade to BiV ICD  -discussed benefits of cardiomems device, consider in future    CKD stage IIIb  -renal function overall stable  - bun/cr 44/1.77 , K 4.5 today  - baseline cr 1.4-1.63  -sees Dr Arvizu     Paroxysmal atrial fib/flutter  - AV pacing   -on amiodarone  -eliquis discontinued with hx of GI bleed   - on coreg 3.125 mg bid  -  seeing  Dr. Sargent     Chronic anemia  -HGB improved to 9.1  - Iron sat 29 %, ferritin 780, serum iron 68.   -continue ferrous sulfate daily  -aranesp per Dr. Arvizu  - hx of transfusions with GI bleed    Plan:   Start farxiga 10 mg daily     Continue medications as prescribed     Call if having shortness of breath, cough, wheezing, chest pain, dizziness, lightheadedness, heart racing, palpitations,  swelling, weight gain, fatigue or weakness    Call 911 with severe shortness of breath, chest pain or chest pressure not improved after 15 minutes of rest or if feeling faint,  passing out or having a fall     Weigh yourself daily in the morning before breakfast, call if gaining 3 lbs or more overnight or more than 5 lbs in one week.    Follow 2000 mg sodium restricted , heart healthy diet, Keep daily fluids at 48-64 ounces per day    Follow up with Dr. Boles's nurse practitioner Gail URBANO 7/2/24 11 am     Follow up with the specialty care clinic 8/1/24    Bath every day, paying special attention to washing your private area. Watch for signs of a urinary tract infection or skin infection including difficulty urinating, pain or burning with urination, pink tinged or blood in urine, fatigue, weakness, confusion. Watch for redness or irritation on skin on or around the vaginal area.    Limit sodium to  2000 mg daily. Common high sodium foods include frozen dinners, soups (not homemade), some cereal, vegetable juice, canned vegetables, lunch meats, processed meats like hotdogs, sausage, vila, pepperoni, soy sauce, pre-packaged rice or potatoes. Please remember to read nutrition labels for sodium content.     www.healthyeating.nhlbi.nih.gov      Exercise daily as tolerated, with goal of doing moderate aerobic exercise like walking for about 30 minutes 5 days per week. Start by walking at a slow to moderate pace for 5-10 minutes 2-3 times a day. Pace your activity to prevent shortness of breath or fatigue. Stop exercise if you develop chest pain, lightheadedness, or significant shortness of breath      Current Outpatient Medications:     spironolactone 25 MG Oral Tab, , Disp: , Rfl:     HYDROcodone-acetaminophen (NORCO)  MG Oral Tab, Take 1 tablet by mouth every 8 (eight) hours as needed for Pain., Disp: 60 tablet, Rfl: 0    bumetanide 2 MG Oral Tab, Take 1 tablet (2 mg total) by mouth daily., Disp: 30  tablet, Rfl: 0    alendronate 70 MG Oral Tab, Take 1 tablet (70 mg total) by mouth once a week., Disp: 12 tablet, Rfl: 3    methotrexate 2.5 MG Oral Tab, TAKE 6 TABLETS BY MOUTH 1 TIME A WEEK, Disp: 77 tablet, Rfl: 0    gabapentin 300 MG Oral Cap, Take 1 capsule (300 mg total) by mouth 2 (two) times daily., Disp: 60 capsule, Rfl: 0    lidocaine-menthol 4-1 % External Patch, Place 1 patch onto the skin daily., Disp: 30 patch, Rfl: 0    isosorbide dinitrate 20 MG Oral Tab, Take 1 tablet (20 mg total) by mouth TID (Nitrates)., Disp: 90 tablet, Rfl: 0    PANTOPRAZOLE 40 MG Oral Tab EC, TAKE 1 TABLET(40 MG) BY MOUTH TWICE DAILY BEFORE MEALS, Disp: 180 tablet, Rfl: 0    CARVEDILOL 3.125 MG Oral Tab, Take 1 tablet (3.125 mg total) by mouth 2 (two) times daily with meals., Disp: 60 tablet, Rfl: 1    SACUBITRIL-VALSARTAN 49-51 MG Oral Tab, Take 1 tablet by mouth 2 (two) times daily., Disp: 60 tablet, Rfl: 1    folic acid 800 MCG Oral Tab, Take 1 tablet (800 mcg total) by mouth daily., Disp: 90 tablet, Rfl: 0    amiodarone 200 MG Oral Tab, Take 1 tablet (200 mg total) by mouth daily., Disp: , Rfl:     ferrous sulfate 325 (65 FE) MG Oral Tab EC, Take 1 tablet (325 mg total) by mouth daily with breakfast., Disp: , Rfl:       MAURA LEE NP, 07/01/24, 11:17 AM

## 2024-07-01 NOTE — PATIENT INSTRUCTIONS
Begin farxiga 10 mg tablet daily     Bath every day, paying special attention to washing your private area. Watch for signs of a urinary tract infection or skin infection including difficulty urinating, pain or burning with urination, pink tinged or blood in urine, fatigue, weakness, confusion. Watch for redness or irritation on skin on or around vaginal area.    Continue all your other medications as prescribed     Call if you are having shortness of breath, cough, wheezing, chest pain, dizziness, lightheadedness, heart racing, palpitations, swelling, rapid weight gain, fatigue, weakness, fever or chills.  Call 911 with severe shortness of breath, chest pain or chest pressure not improved after 15 minutes of rest or if feeling faint,  passing out or having a fall     Weigh yourself daily in the morning before breakfast, call if gaining 3 lbs or more overnight or more than 5 lbs in one week.    Follow 2000 mg sodium restricted, heart healthy diet.     Keep daily fluids at 48-64 ounces per day (1.5-2 liters of liquid or 6-8 , 8 oz glasses of liquid)    Follow up with Dr. Boles's nurse practitioner Gail URBANO 7/2/24 11 am     Follow up with the specialty care clinic 8/1/24    Always carry a copy of your current medication list with you    Limit sodium to  2000 mg daily. Common high sodium foods include frozen dinners, soups (not homemade), some cereal, vegetable juice, canned vegetables, lunch meats, processed meats like hotdogs, sausage, vila, pepperoni, soy sauce, pre-packaged rice or potatoes. Please remember to read nutrition labels for sodium content.   www.healthyeating.nhlbi.nih.gov    Exercise daily as tolerated, with goal of doing moderate aerobic exercise like walking for about 30 minutes 5 days per week. Start by walking at a slow to moderate pace for 3-5 minutes 2-3 times a day. Pace your activity to prevent shortness of breath or fatigue. Stop exercise if you develop chest pain, lightheadedness, or  significant shortness of breath

## 2024-07-01 NOTE — PROGRESS NOTES
Subjective:   Margaret is today per w/c,  accompanied by her . She states she gets RAM with activity. She has limited ambulation abilities.     Weight:  Today - 129.6 lb    Home - no  Last visit - 140# hosp  Labs completed : at lab - bmp, BNP   Device:  CardioMEMS Interrogation  n/a    IV placed:  no   Measurements :  RLE calf- 13\" ankle- 7.75\"     LLE calf- 13.2\" ankle- 7.75\"      Patient and medication assessed. Discussed with APN. Treatments completed- leg measurements/assessment. . Medication given- none .  Extensive education on disease management .  Reviewed AVS instructions. Patient verbalized understanding.

## 2024-07-02 ENCOUNTER — TELEPHONE (OUTPATIENT)
Dept: NEPHROLOGY | Facility: CLINIC | Age: 79
End: 2024-07-02

## 2024-07-02 DIAGNOSIS — D64.9 ANEMIA, UNSPECIFIED TYPE: Primary | ICD-10-CM

## 2024-07-02 DIAGNOSIS — N18.32 STAGE 3B CHRONIC KIDNEY DISEASE (HCC): ICD-10-CM

## 2024-07-02 NOTE — TELEPHONE ENCOUNTER
Informed patient /patient daughter Barbi of note below and verbalized understanding.Rn generated order for referral to Hematology.

## 2024-07-02 NOTE — TELEPHONE ENCOUNTER
Kidney function is getting worse.  This is related to Entresto and her diuretics.  May need to decrease dose.  Repeat standing order in 2 weeks.  The patient also now has a low white blood count and low platelet count in addition to her anemia.  This needs to be further evaluated.  Refer to hematology for further workup and evaluation.  Have them see her as soon as possible.

## 2024-07-08 ENCOUNTER — TELEPHONE (OUTPATIENT)
Dept: FAMILY MEDICINE CLINIC | Facility: CLINIC | Age: 79
End: 2024-07-08

## 2024-07-08 ENCOUNTER — TELEPHONE (OUTPATIENT)
Dept: CARDIOLOGY CLINIC | Facility: HOSPITAL | Age: 79
End: 2024-07-08

## 2024-07-08 ENCOUNTER — APPOINTMENT (OUTPATIENT)
Dept: SURGERY | Facility: CLINIC | Age: 79
End: 2024-07-08
Payer: COMMERCIAL

## 2024-07-08 DIAGNOSIS — I50.23 ACUTE ON CHRONIC HFREF (HEART FAILURE WITH REDUCED EJECTION FRACTION) (HCC): Primary | ICD-10-CM

## 2024-07-08 RX ORDER — DAPAGLIFLOZIN 10 MG/1
10 TABLET, FILM COATED ORAL DAILY
Qty: 30 TABLET | Refills: 5 | Status: SHIPPED | OUTPATIENT
Start: 2024-07-08

## 2024-07-08 NOTE — TELEPHONE ENCOUNTER
Message left on patient's home voicemail to see if she is available to come for a visit at the specialty care clinic on Tuesday, July 9, openings at 9 AM, 10 AM and 3 PM.  Will call patient tomorrow to see if she is available for appointments.    Pt having persistent dyspnea with minimal exertion. 02 sat 90% on RA at rest. Will check exercise oximetry if able and evaluate if she needs increased oral or IV diuretics.

## 2024-07-08 NOTE — TELEPHONE ENCOUNTER
Returning call and left voicemail message left for Juliette QUINTANA to call clinic back to discuss patient.     Spoke with home health nurse Juliette.  She reports patient is very short of breath even with minimal exertion.  She had a procedure for a steroid injection of her neck and was very fatigued and short of breath after the procedure.  O2 sats at 90% on room air, /62, home weights have been stable.  She does have a referral to see pulmonology but does not have an appointment till November and waiting to try and get in sooner.     Will plan to offer patient a follow-up appointment tomorrow or this week if she is able to come for evaluation.  Message left on patient's home voicemail to see if she is available to come for a visit at the specialty care clinic on Tuesday, July 9, openings at 9 AM, 10 AM and 3 PM.  Will call patient tomorrow to see if she is available for appointments.

## 2024-07-08 NOTE — TELEPHONE ENCOUNTER
Dr. Rodriguez, do you want to order home oxygen, or recommend another pulmonary specialist.    Spoke to Juliette GIL with [employer], patient full name and date of birth verified.  Juliette was still at patient's home.     At last office visit 6/29/24, Dr. Rodriguez referred patient to pulmonary, Dr. Pacheco.   Appointment scheduled, not available until 10/31/24.     Condition update:    At start of home visit today, when patient first sat down, her SPO2 dropped into the 70's %.  After patient sat and relaxed, SPO2 came up to 90%.    Patient's baseline is usually 98%, she is not on home oxygen.     Juliette GIL is asking if Dr. Rodriguez can order home oxygen, or recommend another pulmonologist that can see patient sooner.

## 2024-07-08 NOTE — TELEPHONE ENCOUNTER
Home health RN Juliette called requesting call back to discuss farxiga and increased shortness of breath.

## 2024-07-09 ENCOUNTER — LAB ENCOUNTER (OUTPATIENT)
Dept: LAB | Facility: HOSPITAL | Age: 79
End: 2024-07-09
Attending: NURSE PRACTITIONER
Payer: MEDICARE

## 2024-07-09 ENCOUNTER — OFFICE VISIT (OUTPATIENT)
Dept: CARDIOLOGY CLINIC | Facility: HOSPITAL | Age: 79
End: 2024-07-09
Attending: NURSE PRACTITIONER
Payer: MEDICARE

## 2024-07-09 ENCOUNTER — TELEPHONE (OUTPATIENT)
Dept: PULMONOLOGY | Facility: CLINIC | Age: 79
End: 2024-07-09

## 2024-07-09 VITALS
HEART RATE: 60 BPM | WEIGHT: 124.81 LBS | BODY MASS INDEX: 21 KG/M2 | DIASTOLIC BLOOD PRESSURE: 74 MMHG | SYSTOLIC BLOOD PRESSURE: 144 MMHG | OXYGEN SATURATION: 98 %

## 2024-07-09 DIAGNOSIS — N18.32 STAGE 3B CHRONIC KIDNEY DISEASE (HCC): ICD-10-CM

## 2024-07-09 DIAGNOSIS — M54.2 BILATERAL POSTERIOR NECK PAIN: Primary | ICD-10-CM

## 2024-07-09 DIAGNOSIS — I50.23 ACUTE ON CHRONIC HFREF (HEART FAILURE WITH REDUCED EJECTION FRACTION) (HCC): Primary | ICD-10-CM

## 2024-07-09 DIAGNOSIS — I50.84 CHF (NYHA CLASS IV, ACC/AHA STAGE D) (HCC): ICD-10-CM

## 2024-07-09 DIAGNOSIS — I48.0 PAF (PAROXYSMAL ATRIAL FIBRILLATION) (HCC): ICD-10-CM

## 2024-07-09 DIAGNOSIS — J81.1 CHRONIC PULMONARY EDEMA (HCC): ICD-10-CM

## 2024-07-09 DIAGNOSIS — I07.1 SEVERE TRICUSPID REGURGITATION: ICD-10-CM

## 2024-07-09 DIAGNOSIS — N17.9 ACUTE KIDNEY INJURY (HCC): ICD-10-CM

## 2024-07-09 DIAGNOSIS — I50.23 ACUTE ON CHRONIC HFREF (HEART FAILURE WITH REDUCED EJECTION FRACTION) (HCC): ICD-10-CM

## 2024-07-09 DIAGNOSIS — M06.9 RHEUMATOID ARTHRITIS OF HAND, UNSPECIFIED LATERALITY, UNSPECIFIED WHETHER RHEUMATOID FACTOR PRESENT (HCC): ICD-10-CM

## 2024-07-09 DIAGNOSIS — J96.01 ACUTE RESPIRATORY FAILURE WITH HYPOXEMIA (HCC): ICD-10-CM

## 2024-07-09 DIAGNOSIS — G89.29 OTHER CHRONIC PAIN: ICD-10-CM

## 2024-07-09 DIAGNOSIS — Z95.810 ICD (IMPLANTABLE CARDIOVERTER-DEFIBRILLATOR), DUAL, IN SITU: ICD-10-CM

## 2024-07-09 DIAGNOSIS — R06.02 SHORTNESS OF BREATH: ICD-10-CM

## 2024-07-09 DIAGNOSIS — D50.9 IRON DEFICIENCY ANEMIA, UNSPECIFIED IRON DEFICIENCY ANEMIA TYPE: ICD-10-CM

## 2024-07-09 PROBLEM — T68.XXXA HYPOTHERMIA, INITIAL ENCOUNTER: Status: RESOLVED | Noted: 2024-01-13 | Resolved: 2024-07-09

## 2024-07-09 PROBLEM — T68.XXXA HYPOTHERMIA, INITIAL ENCOUNTER: Status: RESOLVED | Noted: 2024-01-01 | Resolved: 2024-01-01

## 2024-07-09 LAB
ANION GAP SERPL CALC-SCNC: 10 MMOL/L (ref 0–18)
BNP SERPL-MCNC: >5000 PG/ML
BUN BLD-MCNC: 47 MG/DL (ref 9–23)
BUN/CREAT SERPL: 22 (ref 10–20)
CALCIUM BLD-MCNC: 9.3 MG/DL (ref 8.7–10.4)
CHLORIDE SERPL-SCNC: 107 MMOL/L (ref 98–112)
CO2 SERPL-SCNC: 24 MMOL/L (ref 21–32)
CREAT BLD-MCNC: 2.14 MG/DL
EGFRCR SERPLBLD CKD-EPI 2021: 23 ML/MIN/1.73M2 (ref 60–?)
FASTING STATUS PATIENT QL REPORTED: YES
GLUCOSE BLD-MCNC: 90 MG/DL (ref 70–99)
OSMOLALITY SERPL CALC.SUM OF ELEC: 304 MOSM/KG (ref 275–295)
POTASSIUM SERPL-SCNC: 4.2 MMOL/L (ref 3.5–5.1)
SODIUM SERPL-SCNC: 141 MMOL/L (ref 136–145)

## 2024-07-09 PROCEDURE — 99215 OFFICE O/P EST HI 40 MIN: CPT | Performed by: NURSE PRACTITIONER

## 2024-07-09 PROCEDURE — 83880 ASSAY OF NATRIURETIC PEPTIDE: CPT

## 2024-07-09 PROCEDURE — G2212 PROLONG OUTPT/OFFICE VIS: HCPCS | Performed by: NURSE PRACTITIONER

## 2024-07-09 PROCEDURE — 36415 COLL VENOUS BLD VENIPUNCTURE: CPT

## 2024-07-09 PROCEDURE — 80048 BASIC METABOLIC PNL TOTAL CA: CPT

## 2024-07-09 RX ORDER — BUMETANIDE 0.25 MG/ML
INJECTION INTRAMUSCULAR; INTRAVENOUS
Status: COMPLETED
Start: 2024-07-09 | End: 2024-07-09

## 2024-07-09 RX ORDER — ACETAMINOPHEN 500 MG
500 TABLET ORAL EVERY 6 HOURS PRN
Status: ON HOLD | COMMUNITY

## 2024-07-09 RX ORDER — ISOSORBIDE DINITRATE 20 MG/1
20 TABLET ORAL
Qty: 90 TABLET | Refills: 3 | Status: ON HOLD | OUTPATIENT
Start: 2024-07-09

## 2024-07-09 RX ORDER — GABAPENTIN 300 MG/1
300 CAPSULE ORAL 3 TIMES DAILY
Qty: 90 CAPSULE | Refills: 0 | Status: ON HOLD | OUTPATIENT
Start: 2024-07-09

## 2024-07-09 RX ADMIN — BUMETANIDE 2 MG: 0.25 INJECTION INTRAMUSCULAR; INTRAVENOUS at 16:35:00

## 2024-07-09 NOTE — TELEPHONE ENCOUNTER
A setting has to be determined.  I do not know if this applies, but this should have been determined before the patient was discharged from the hospital.  Any of the pulmonary specialist in [facility] group would be appropriate for this patient to see.  Please help to set this patient up to be seen by the first available pulmonary specialist that is in the group with .  Thank you.

## 2024-07-09 NOTE — PATIENT INSTRUCTIONS
Increase bumex to 2 mg - full tablet in the morning and take a half tablet  (1 mg ) in the afternoon for three days starting tomorrow,  then resume bumex 2 mg one tab daily     Continue all your other medications as prescribed     Call if you are having shortness of breath, cough, wheezing, chest pain, dizziness, lightheadedness, heart racing, palpitations, swelling, rapid weight gain, fatigue, weakness, fever or chills.  Call 911 with severe shortness of breath, chest pain or chest pressure not improved after 15 minutes of rest or if feeling faint,  passing out or having a fall     Weigh yourself daily in the morning before breakfast, call if gaining 3 lbs or more overnight or more than 5 lbs in one week.    Follow 2000 mg sodium restricted, heart healthy diet.     Keep daily fluids at 48-64 ounces per day (1.5-2 liters of liquid or 6-8 , 8 oz glasses of liquid)    Follow up with Palliative care team, Angy URBANO, call to make an appointment at     Follow up with Dr. Sargent this July     Follow up with the specialty care clinic in 1 week    An order has been placed for home oxygen with [facility], the Specialty care clinic will follow up to see if oxygen is approved.     Always carry a copy of your current medication list with you    Limit sodium to  2000 mg daily. Common high sodium foods include frozen dinners, soups (not homemade), some cereal, vegetable juice, canned vegetables, lunch meats, processed meats like hotdogs, sausage, vila, pepperoni, soy sauce, pre-packaged rice or potatoes. Please remember to read nutrition labels for sodium content.   www.healthyeating.nhlbi.nih.gov    Exercise daily as tolerated, with goal of doing moderate aerobic exercise like walking for about 30 minutes 5 days per week. Start by walking at a slow to moderate pace for 3-5 minutes 2-3 times a day. Pace your activity to prevent shortness of breath or fatigue. Stop exercise if you develop chest pain,  lightheadedness, or significant shortness of breath

## 2024-07-09 NOTE — PROGRESS NOTES
[facility]    Margaret Silverio Patient Status:  Outpatient    3/14/1945 MRN [id number]   Location [facility] DO Dr. [address] Dr. Jonny Silverio is a 79 year old female who presents to clinic for assessment and management for hospitalization for acute on chronic heart failure with severe tricuspid regurg.     Admitted -24 with increased webb and wt gin. CXR noted pulmonary congestion, BNP 3395. Diuresed with IV bumex and IV milrinone.  EF improved to 30-35% from 15-20 % with severe TR. Consider BiV ICD upgrade as out-pt.    Admitted -6/3/24 with ARF with hypoxemia  to acute CHF and anemia. Required bipap, developed GEORGE after IV diuresis. Transfused PRBC x1.  Home on bumex 1 mg daily and isosorbide dinitrate.     Admitted 5/15-24 with hypotension , GEORGE, chronic anemia.  Improved with IV fluids, acute GI bleed r/o.     Admitted -24 with ARF with hypoxemia, cardiogenic shock and acute on chronic HF.  EF 15%, severe TR. Improved with milrinone drip and IV bumex. 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     Admitted -/24 with Melena,  acute GI bleeding, pneumonia with sepsis and acute on chronic HF.  Eliquis dc'd. Furosemide increased to 40 mg daily.       Admitted  -23 with worsening webb and chest discomfort. Discharged on  after GI bleed due to small AVM in duodenum, s/p clipping .She accidentally stopped eliquis, entresto and lasix.  Pro bnp 42,000, wbc 11.5, troponin negative. Cxr with small bilateral pleural effusions R>L and mil bibasilar opacities. Was also in afib RVR and blood pressure uncontrolled. Improved after IV labetolol, IV furosemide, creatinine elevated to 1.36, cardiology and nephrology consulted. Given venofer x2  for ANGÉLICA. Switched to torsemide 20 mg daily, coreg increased to 25 mg bid, restarted on eliquis 5 mg bid,  continued entresto 49/51 mg bid.     Problem List:  Acute on chronic HFpEF, NYHA class IV, Stage D, LVEF 30-35%, improved from 15-20%, Wide-Open TR   Non ischemic cardiomyopathy  Dual chamber ICD, 9/2023.  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 02 sat 90% on RA at rest . /62.   Pulmonary consultation given by PCP.     Difficult to move with severe, neck, back, leg and hand pain.   Smith and fatigue with minimal activity, with sitting up in chair, aggravated with neck and back pain. + wheezing, no cough.   Denies cp, heart racing, orthopnea, dizziness, bloating, minimal LE edema. Denies falling.   No orthopnea with 3 pillows. Using walker and wheelchair, needing assistance to stand and walk a couple of steps to the bathroom     Missed 2 days of bumex, yesterday and today     Review of Systems:  Constitutional: negative for chills or fever  Respiratory:  negative for cough, hemoptysis  Cardiovascular: negative for chest pain, exertional chest pressure/discomfort, near-syncope, orthopnea and palpitations  Gastrointestinal: negative for abdominal pain, diarrhea, melena, nausea and vomiting  Hematologic/lymphatic: negative  Musculoskeletal: negative for muscle weakness. See above     Objective:  Lab Results   Component Value Date/Time    WBC 1.5 (L) 07/01/2024 10:53 AM    HGB 9.1 (L) 07/01/2024 10:53 AM    HCT 29.2 (L) 07/01/2024 10:53 AM    PLT 75.0 (L) 07/01/2024 10:53 AM    CREATSERUM 1.77 (H) 07/01/2024 10:53 AM    CREATSERUM 1.77 (H) 07/01/2024 10:53 AM    BUN 44 (H) 07/01/2024 10:53 AM    BUN 44 (H) 07/01/2024 10:53 AM     07/01/2024 10:53 AM     07/01/2024 10:53 AM    K 4.5 07/01/2024  10:53 AM    K 4.5 07/01/2024 10:53 AM     07/01/2024 10:53 AM     07/01/2024 10:53 AM    CO2 25.0 07/01/2024 10:53 AM    CO2 25.0 07/01/2024 10:53 AM     (H) 07/01/2024 10:53 AM     (H) 07/01/2024 10:53 AM    CA 9.3 07/01/2024 10:53 AM    CA 9.3 07/01/2024 10:53 AM    ALB 4.0 07/01/2024 10:53 AM    ALKPHO 328 (H) 06/18/2024 01:29 PM    BILT 0.8 06/18/2024 01:29 PM    TP 7.7 06/18/2024 01:29 PM    AST 23 06/18/2024 01:29 PM    ALT 11 06/18/2024 01:29 PM    PTT 35.2 02/09/2024 02:58 PM    INR 1.25 (H) 02/09/2024 02:58 PM    PTP 16.5 (H) 02/09/2024 02:58 PM    T4F 1.4 06/18/2024 01:29 PM    TSH 7.534 (H) 06/18/2024 01:29 PM    LIP 32 01/13/2024 05:36 AM    ESRML 61 (H) 04/23/2024 02:30 PM    CRP 5.10 (H) 04/23/2024 02:30 PM    MG 1.9 06/21/2024 04:59 AM    PHOS 3.8 07/01/2024 10:53 AM    B12 >2,000 (H) 07/01/2024 10:53 AM    PGLU 106 (H) 05/24/2024 09:02 PM       Labs drawn : BMP, BNP, results reviewed with patient, independently interpreted results    /70 (BP Location: Right arm, Patient Position: Sitting, Cuff Size: child)   Pulse 60   Wt 124 lb 12.8 oz (56.6 kg)   SpO2 92%   BMI 21.42 kg/m²       Date  Clinic wt Home wt MCI wt DC wt IV med  PO med   6/6/23 141        6/15/24 146 141       6/18/24   126      6/21/24    140     7/1/24 129   ---       7/9/24 124.8   ---   Bumex 2 mg IV push given                  General appearance: alert, appears older than stated age and cooperative  Neck: + JVD to below jawline  Lungs: clear to auscultation bilaterally, few bibasilar crackles  Heart: S1, S2 normal, +  murmur, click, no rub or gallop, regular rate and irregular rhythm  Abdomen: soft, non-tender; bowel sounds normal; no masses,  mild abdominal distension  Extremities: +1 desirae ankle/pedal  edema  Pulses: 2+ and symmetric  Neurologic: Grossly normal  Measurements:   7/9/24: RLE Calf: 11.5\" Ankle: 7.5\"    LLE Calf: 11.5\" Ankle: 7.25\"   7/1/24: RLE calf- 13\" ankle- 7.75\"     LLE calf-  13.2\" ankle- 7.75\"     Diagnostics:  EK24: AV paced.    Echo: 24:  EF 30-35%, Severe LA and mild R atrial dilation, mild aortic stenosis, bioprosthetic mitral valve with normal function, no significant regurg. Wide open TR. EF improved from last echo    ECHO 2/10/2024:  EF 15-20%, grade 4 DD, severe RVH, , moderate R dysfunction, bilateral atrial dilation, echodensity in LA, normal functioning bioprosthetic Mitral valve,  wide open TR.     R/L Heart cath: 10/2022 LVEDP 14 mmHg, no cad.  RA 11  RV 38/6  PA 40/13 mean 28  PCW 18     CO 4.1/ CI 2.3  SVR 1839/        75 minutes spent on patient education, chart review and documentation for visit.    Patient and family instructed regarding clinic procedures, hours, purpose of clinic visits, sodium restricted diet, low sodium foods, fluid restrictions, daily weights, medication regimen s/s of heart failure exacerbation, dyspnea and hypoxemia and when to call APN/clinic.discussed benefits of palliative care treatment, referral given.  Provided counseling, coordination of care and education given. Patient and family receptive.    Assessment:  Devices:   [ x ]ICD  [ ]BIV-ICD  [  ]PPM  [  ]Life Vest  [  ]CardioMEMS    Acute on chronic HF recovered EF, NYHA class III, stage D, dual chamber ICD and chronic pulmonary edema with severe MR.   -EF improved to from 30-35%,(24) from 10-15%, with wide open tricuspid regurg  -non ischemic cardiomyopathy and history of tachycardia induced cardiomyopathy  -history of MV replacement in 2017, mild MR  -frequent hospital admission for hypervolemia,requiring milrinone and bumex drip, switched to bumex 2 mg daily   -diuretics: bumex 2  mg daily  -GDMT: entresto, coreg, spironolactone, isosorbide, farxiga    - BP  normal   -Renal function has decreased, bun/cr: 47/2.1444/1.77 <-43/1.64 <-45/1.8 <-37/1.4 <-38/1.5  -pro BNP: > 5000 today <- 5000 (24), 3899 (24) <- >5000 (24) <--18,762 (24)  <-42,900,(2023)   -worsening dyspnea and fatigue , webb at rest and with talking, exacerbated by chronic pain from severe RA.   -renal function worsening with hypervolemia and newly on farxiga 7/2/24.  - hypervolemia today, wt down 5 lbs despite poor appetite, new bibasilar crackles and increased desirae LE edema, with episodes of hypoxemia yesterday, 02 sat down to 88% on RA at rest.   -02 sat 90-93% on RA today improved to 98% on 2 L n/c at rest with improved dyspnea  -medical management for severe MR, continue IV/oral diuretics for hypervolemia  -bumex 2 mg IV push given today   -increase bumex to 2 mg in the morning and  1 mg in the afternoon for 3 days.   -begin home 02 for palliative care at 2 liters per nasal cannula   -continue farxiga 10 mg daily  - F/U in [facility] in 1 week with repeat bmp   -consider upgrade to BiV ICD, seeing Dr. Sargent this month  -referral given for palliative care, recommending consultation for pain management and comfort care      GEORGE/CKD stage IIIb  -renal function overall stable  - bun/cr 47/2.17 , K 4.2 today  - baseline cr 1.4-1.63  -sees Dr Arvizu     Paroxysmal atrial fib/flutter  - AV pacing   -on amiodarone  -eliquis discontinued with hx of GI bleed   - on coreg 3.125 mg bid  -  seeing  Dr. Sargent     Chronic anemia  -HGB improved to 9.1  - Iron sat 29 %, ferritin 780, serum iron 68.   -continue ferrous sulfate daily  -aranesp per Dr. Arvizu  - hx of transfusions with GI bleed    Acute respiratory with hypoxemia  - episode of hypoxemia during procedure yesterday, 02 sat reported at 88%, > 90 % on 02  - 02 sat 90 -93 % on RA at rest with dyspnea at rest 2/2 to end stage acute on chronic HFrEF with severe tricuspid regurg  - bibasilar crackles with chronic pulmonary edema  -02 sat 98% on 2 L n/c for palliative treatment, dyspnea improved on 02 at rest.  - recommending home 02 at 2 L n/c continuously     Plan:   Increase bumex to 2 mg in the am, 1 mg in the afternoon for 3 days then  resume bumex 2 mg daily     Continue medications as prescribed     Call if having shortness of breath, cough, wheezing, chest pain, dizziness, lightheadedness, heart racing, palpitations, swelling, weight gain, fatigue or weakness    Call 911 with severe shortness of breath, chest pain or chest pressure not improved after 15 minutes of rest or if feeling faint,  passing out or having a fall     Weigh yourself daily in the morning before breakfast, call if gaining 3 lbs or more overnight or more than 5 lbs in one week.    Follow 2000 mg sodium restricted , heart healthy diet, Keep daily fluids at 48-64 ounces per day    Follow up with Arie this month as scheduled     Follow up with the specialty care clinic on 7/15/24       Limit sodium to  2000 mg daily. Common high sodium foods include frozen dinners, soups (not homemade), some cereal, vegetable juice, canned vegetables, lunch meats, processed meats like hotdogs, sausage, vila, pepperoni, soy sauce, pre-packaged rice or potatoes. Please remember to read nutrition labels for sodium content.     www.healthyeating.nhlbi.nih.gov      Exercise daily as tolerated, with goal of doing moderate aerobic exercise like walking for about 30 minutes 5 days per week. Start by walking at a slow to moderate pace for 5-10 minutes 2-3 times a day. Pace your activity to prevent shortness of breath or fatigue. Stop exercise if you develop chest pain, lightheadedness, or significant shortness of breath      Current Outpatient Medications:     acetaminophen (TYLENOL EXTRA STRENGTH) 500 MG Oral Tab, Take 1 tablet (500 mg total) by mouth every 6 (six) hours as needed for Pain., Disp: , Rfl:     dapagliflozin (FARXIGA) 10 MG Oral Tab, Take 1 tablet (10 mg total) by mouth daily., Disp: 30 tablet, Rfl: 5    spironolactone 25 MG Oral Tab, , Disp: , Rfl:     HYDROcodone-acetaminophen (NORCO)  MG Oral Tab, Take 1 tablet by mouth every 8 (eight) hours as needed for Pain., Disp: 60 tablet,  Rfl: 0    bumetanide 2 MG Oral Tab, Take 1 tablet (2 mg total) by mouth daily., Disp: 30 tablet, Rfl: 0    alendronate 70 MG Oral Tab, Take 1 tablet (70 mg total) by mouth once a week., Disp: 12 tablet, Rfl: 3    methotrexate 2.5 MG Oral Tab, TAKE 6 TABLETS BY MOUTH 1 TIME A WEEK, Disp: 77 tablet, Rfl: 0    lidocaine-menthol 4-1 % External Patch, Place 1 patch onto the skin daily., Disp: 30 patch, Rfl: 0    isosorbide dinitrate 20 MG Oral Tab, Take 1 tablet (20 mg total) by mouth TID (Nitrates)., Disp: 90 tablet, Rfl: 0    PANTOPRAZOLE 40 MG Oral Tab EC, TAKE 1 TABLET(40 MG) BY MOUTH TWICE DAILY BEFORE MEALS, Disp: 180 tablet, Rfl: 0    CARVEDILOL 3.125 MG Oral Tab, Take 1 tablet (3.125 mg total) by mouth 2 (two) times daily with meals., Disp: 60 tablet, Rfl: 1    SACUBITRIL-VALSARTAN 49-51 MG Oral Tab, Take 1 tablet by mouth 2 (two) times daily., Disp: 60 tablet, Rfl: 1    folic acid 800 MCG Oral Tab, Take 1 tablet (800 mcg total) by mouth daily., Disp: 90 tablet, Rfl: 0    amiodarone 200 MG Oral Tab, Take 1 tablet (200 mg total) by mouth daily., Disp: , Rfl:     ferrous sulfate 325 (65 FE) MG Oral Tab EC, Take 1 tablet (325 mg total) by mouth daily with breakfast., Disp: , Rfl:       MAURA LEE NP, 07/08/24

## 2024-07-09 NOTE — TELEPHONE ENCOUNTER
Incoming call from JEFFERY Leyva from Dr. Rodriguez's office requesting appointment for patient to evaluate for home oxygen.  Spoke with patient states she has [facility] at 3pm to evaluate oxygen needs, hospital follow up appointment scheduled with Dr. Liao on 7/24/24 at 2:30pm, verified date, time and location, patient verbalized understanding.

## 2024-07-09 NOTE — TELEPHONE ENCOUNTER
Called Pulmonology and spoke to JEFFERY Barreto, verified patient's Name and . Relayed information below. States Dr. Pacheco currently has no availability but will be looking into other provider's schedule, possibly Dr. Liao so patient can be seen sooner. They will call the patient directly to schedule the appointment.    Attempted to call patient, left detailed message (okay per JENNIFER) to let her know that we are trying to get a sooner Pulmonology appointment for her. Asked to call back if with any questions or concerns.     Dr. Michael SALGADO.

## 2024-07-10 ENCOUNTER — TELEPHONE (OUTPATIENT)
Dept: HEMATOLOGY/ONCOLOGY | Facility: HOSPITAL | Age: 79
End: 2024-07-10

## 2024-07-10 NOTE — TELEPHONE ENCOUNTER
Received Palliative Care Referral order from KODI Collins in [facility].     I called Margaret. No answer. Left message to call me back ([phone]) to discuss and schedule Palliative Care Consult appointment if she wishes to.     Awaiting call back.

## 2024-07-11 ENCOUNTER — TELEPHONE (OUTPATIENT)
Dept: CARDIOLOGY CLINIC | Facility: HOSPITAL | Age: 79
End: 2024-07-11

## 2024-07-11 DIAGNOSIS — I50.23 ACUTE ON CHRONIC HFREF (HEART FAILURE WITH REDUCED EJECTION FRACTION) (HCC): Primary | ICD-10-CM

## 2024-07-11 NOTE — TELEPHONE ENCOUNTER
Appointment reminder message sent via [employer] with request to come 30 minutes early for lab draw.  Lab order entered and scheduled.

## 2024-07-15 ENCOUNTER — APPOINTMENT (OUTPATIENT)
Dept: GENERAL RADIOLOGY | Facility: HOSPITAL | Age: 79
End: 2024-07-15
Attending: EMERGENCY MEDICINE
Payer: MEDICARE

## 2024-07-15 ENCOUNTER — HOSPITAL ENCOUNTER (INPATIENT)
Facility: HOSPITAL | Age: 79
LOS: 7 days | Discharge: HOME HEALTH | End: 2024-07-22
Attending: EMERGENCY MEDICINE | Admitting: HOSPITALIST
Payer: MEDICARE

## 2024-07-15 DIAGNOSIS — M54.12 CERVICAL RADICULOPATHY: ICD-10-CM

## 2024-07-15 DIAGNOSIS — I50.9 ACUTE ON CHRONIC CONGESTIVE HEART FAILURE, UNSPECIFIED HEART FAILURE TYPE (HCC): Primary | ICD-10-CM

## 2024-07-15 LAB
ANION GAP SERPL CALC-SCNC: 7 MMOL/L (ref 0–18)
ATRIAL RATE: 60 BPM
BASOPHILS # BLD AUTO: 0.02 X10(3) UL (ref 0–0.2)
BASOPHILS NFR BLD AUTO: 1.4 %
BNP SERPL-MCNC: 1944 PG/ML
BUN BLD-MCNC: 51 MG/DL (ref 9–23)
CALCIUM BLD-MCNC: 9.2 MG/DL (ref 8.7–10.4)
CHLORIDE SERPL-SCNC: 108 MMOL/L (ref 98–112)
CO2 SERPL-SCNC: 27 MMOL/L (ref 21–32)
CREAT BLD-MCNC: 2.27 MG/DL
DEPRECATED RDW RBC AUTO: 67.5 FL (ref 35.1–46.3)
EGFRCR SERPLBLD CKD-EPI 2021: 22 ML/MIN/1.73M2 (ref 60–?)
EOSINOPHIL # BLD AUTO: 0.03 X10(3) UL (ref 0–0.7)
EOSINOPHIL NFR BLD AUTO: 2 %
ERYTHROCYTE [DISTWIDTH] IN BLOOD BY AUTOMATED COUNT: 20 % (ref 11–15)
GLUCOSE BLD-MCNC: 96 MG/DL (ref 70–99)
HCT VFR BLD AUTO: 30 %
HGB BLD-MCNC: 9.6 G/DL
IMM GRANULOCYTES # BLD AUTO: 0.02 X10(3) UL (ref 0–1)
IMM GRANULOCYTES NFR BLD: 1.4 %
LYMPHOCYTES # BLD AUTO: 0.45 X10(3) UL (ref 1–4)
LYMPHOCYTES NFR BLD AUTO: 30.6 %
MCH RBC QN AUTO: 32.3 PG (ref 26–34)
MCHC RBC AUTO-ENTMCNC: 32 G/DL (ref 31–37)
MCV RBC AUTO: 101 FL
MONOCYTES # BLD AUTO: 0.09 X10(3) UL (ref 0.1–1)
MONOCYTES NFR BLD AUTO: 6.1 %
NEUTROPHILS # BLD AUTO: 0.86 X10 (3) UL (ref 1.5–7.7)
NEUTROPHILS # BLD AUTO: 0.86 X10(3) UL (ref 1.5–7.7)
NEUTROPHILS NFR BLD AUTO: 58.5 %
P AXIS: -28 DEGREES
PLATELET # BLD AUTO: 221 10(3)UL (ref 150–450)
PLATELET MORPHOLOGY: NORMAL
PLATELETS.RETICULATED NFR BLD AUTO: 2.9 % (ref 0–7)
POTASSIUM SERPL-SCNC: 5.3 MMOL/L (ref 3.5–5.1)
Q-T INTERVAL: 544 MS
QRS DURATION: 226 MS
QTC CALCULATION (BEZET): 544 MS
R AXIS: -79 DEGREES
RBC # BLD AUTO: 2.97 X10(6)UL
SODIUM SERPL-SCNC: 142 MMOL/L (ref 136–145)
T AXIS: 100 DEGREES
VENTRICULAR RATE: 60 BPM
WBC # BLD AUTO: 1.5 X10(3) UL (ref 4–11)

## 2024-07-15 PROCEDURE — 99223 1ST HOSP IP/OBS HIGH 75: CPT | Performed by: HOSPITALIST

## 2024-07-15 PROCEDURE — 71045 X-RAY EXAM CHEST 1 VIEW: CPT | Performed by: EMERGENCY MEDICINE

## 2024-07-15 RX ORDER — DOBUTAMINE HYDROCHLORIDE 200 MG/100ML
2.5 INJECTION INTRAVENOUS CONTINUOUS
Status: DISCONTINUED | OUTPATIENT
Start: 2024-07-15 | End: 2024-07-21

## 2024-07-15 RX ORDER — CARVEDILOL 3.12 MG/1
3.12 TABLET ORAL 2 TIMES DAILY WITH MEALS
Status: DISCONTINUED | OUTPATIENT
Start: 2024-07-15 | End: 2024-07-22

## 2024-07-15 RX ORDER — MELATONIN
800 DAILY
Status: DISCONTINUED | OUTPATIENT
Start: 2024-07-16 | End: 2024-07-22

## 2024-07-15 RX ORDER — CYCLOBENZAPRINE HCL 5 MG
5 TABLET ORAL ONCE
Status: COMPLETED | OUTPATIENT
Start: 2024-07-15 | End: 2024-07-16

## 2024-07-15 RX ORDER — METOCLOPRAMIDE HYDROCHLORIDE 5 MG/ML
5 INJECTION INTRAMUSCULAR; INTRAVENOUS EVERY 8 HOURS PRN
Status: DISCONTINUED | OUTPATIENT
Start: 2024-07-15 | End: 2024-07-22

## 2024-07-15 RX ORDER — GABAPENTIN 300 MG/1
300 CAPSULE ORAL 3 TIMES DAILY
Status: DISCONTINUED | OUTPATIENT
Start: 2024-07-15 | End: 2024-07-22

## 2024-07-15 RX ORDER — ACETAMINOPHEN 500 MG
500 TABLET ORAL EVERY 4 HOURS PRN
Status: DISCONTINUED | OUTPATIENT
Start: 2024-07-15 | End: 2024-07-22

## 2024-07-15 RX ORDER — PANTOPRAZOLE SODIUM 40 MG/1
40 TABLET, DELAYED RELEASE ORAL
Status: DISCONTINUED | OUTPATIENT
Start: 2024-07-15 | End: 2024-07-22

## 2024-07-15 RX ORDER — ISOSORBIDE DINITRATE 20 MG/1
20 TABLET ORAL
Status: DISCONTINUED | OUTPATIENT
Start: 2024-07-15 | End: 2024-07-22

## 2024-07-15 RX ORDER — AMIODARONE HYDROCHLORIDE 200 MG/1
200 TABLET ORAL DAILY
Status: DISCONTINUED | OUTPATIENT
Start: 2024-07-16 | End: 2024-07-22

## 2024-07-15 RX ORDER — ONDANSETRON 2 MG/ML
4 INJECTION INTRAMUSCULAR; INTRAVENOUS EVERY 6 HOURS PRN
Status: DISCONTINUED | OUTPATIENT
Start: 2024-07-15 | End: 2024-07-22

## 2024-07-15 RX ORDER — HEPARIN SODIUM 5000 [USP'U]/ML
5000 INJECTION, SOLUTION INTRAVENOUS; SUBCUTANEOUS EVERY 12 HOURS SCHEDULED
Status: DISCONTINUED | OUTPATIENT
Start: 2024-07-15 | End: 2024-07-22

## 2024-07-15 RX ORDER — HYDROCODONE BITARTRATE AND ACETAMINOPHEN 10; 325 MG/1; MG/1
1 TABLET ORAL EVERY 8 HOURS PRN
Status: DISCONTINUED | OUTPATIENT
Start: 2024-07-15 | End: 2024-07-16

## 2024-07-15 RX ORDER — SPIRONOLACTONE 25 MG/1
25 TABLET ORAL DAILY
Status: DISCONTINUED | OUTPATIENT
Start: 2024-07-16 | End: 2024-07-15

## 2024-07-15 RX ORDER — BUMETANIDE 0.25 MG/ML
2 INJECTION INTRAMUSCULAR; INTRAVENOUS ONCE
Status: COMPLETED | OUTPATIENT
Start: 2024-07-15 | End: 2024-07-15

## 2024-07-15 NOTE — CONSULTS
G (consult dictated)    Assessment:  1.  Presentation with severe weakness and shortness of breath.    2.  Large left pleural effusion.    3.  History of nonischemic dilated cardiomyopathy with severe left ventricular dysfunction, ejection fraction 15 to 20%.    4.  History of atrial flutter.  Has dual-chamber ICD.  History of left atrial appendage ligation.    5.  History of GI bleeding.    6.  Severe neutropenia    7.  Chronic kidney disease, stage III-IV    Plan:  1.  Diurese with IV diuretics.    2.  IV dobutamine to improve cardiac output and promote diuresis.    3.  Continue home heart failure medications as tolerated.    4.  Might benefit from thoracentesis of left pleural effusion.    5.  Might benefit from increasing the lower heart rate on the pacemaker to improve cardiac output.    Thank you.  Will follow.

## 2024-07-15 NOTE — ED QUICK NOTES
Pt reporting \"not feeling well\". She did not take her bumex this morning. She says she's been incontinent the past 2 days.

## 2024-07-15 NOTE — ED QUICK NOTES
Orders for admission, patient is aware of plan and ready to go upstairs. Any questions, please call ED RN Iesha at extension [phone].     Patient Covid vaccination status: Fully vaccinated     COVID Test Ordered in ED: None    COVID Suspicion at Admission: N/A    Running Infusions:  None    Mental Status/LOC at time of transport: AOx4    Other pertinent information:   CIWA score: N/A   NIH score:  N/A

## 2024-07-15 NOTE — HISTORICAL OFFICE NOTE
[facility]  [address] [facility], [address] [place] [address]  [phone]      Margaret Silverio  Progress Note  Demographics:  Name: Margaret Silverio YOB: 1945  Age: 79, Female Medical Record No: [id number]  Visited Date/Time: 07/02/2024 11:00 AM    Chief Complaints  Follow up  History of Present Illness  This is a patient of Dr. Boles with a history of nonischemic cardiomyopathy, heart failure reduced ejection fraction, severe tricuspid valve regurgitation, bioprosthetic mitral valve replacement, possible TIA, atrial flutter, dual chamber ICD, hypertension, hyperlipidemia, severe GI bleed and left atrial appendage occlusion.  Patient has had an ejection fraction of 35 to 40% but recently was found to be decreased to 15 to 20%.  She was recently discharged after a second hospitalization for treatment of decompensated heart failure that required BiPAP and diuresis with the aid of milrinone and IV diuretics.  Nephrology was on board for her chronic kidney disease with baseline creatinine 1.4-1.6.  She was discharged on 6/3/2024 on Bumex 1 mg daily per nephrology.  She followed up in the office 4 days later and had not been able to receive her Bumex.  A new prescription was sent but unfortunately the pharmacy did not have the medication available and she was without her diuretic for about a week.  At her last office visit she was found to be in acute heart failure likely secondary to not being able to receive her Bumex in a timely fashion and was directed to the emergency department.  She was admitted for diuresis and comes today for follow-up.    During her most recent hospitalization patient responded to IV Bumex.  An echocardiogram was done showing ejection fraction had improved to 30 to 35%.  She has wide-open tricuspid valve regurgitation.  Her discharge weight was 140 pounds and she was discharged on Bumex 2 mg daily.  She states that home weights have been stable.  She did  follow-up with the heart failure clinic yesterday and Farxiga was prescribed and she is scheduled to see them again in a month.  Patient also has anemia with most recent hemoglobin 9.1.  This is being managed by Dr. Arvizu.  Most recent BUN was 44 with creatinine 1.77, sodium 142 and potassium 4.5.    She is here today with her spouse and daughter.  She reports shortness of breath with minimal exertion but also does very little.  She denies weight gain, increased lower extremity edema, shortness of breath at rest or chest discomfort.  Family is wondering about her doing exercise to increase her endurance.  She has chronic pain from her rheumatoid arthritis and her primary care physician is applying for medical marijuana.  He also has referred her to pulmonary for evaluation of her shortness of breath.  Cardiac risk factors Former smoker  Past Medical History  1.ACC/AHA stage B systolic heart failure  2.Nonischemic cardiomyopathy  3.Anemia, unspecified  4.Anorexia  5.Chronic kidney insufficiency, stage 3 (moderate)  6.H/O hypotension  7.Severe tricuspid regurgitation  8.SOB (shortness of breath)  9.On amiodarone therapy  10.Atrial fibrillation (Afib), paroxysmal - A Fib  11.Claudication  12.History of mitral valve replacement (MVR) - Hx  13.Benign essential HTN  14.Acute kidney injury (HCC)  Past Surgical History  1.Automatic implantable cardiac defibrillator (S/P AICD)  2.History of mitral valve replacement (MVR) - Hx  Family History  1. Father - No history of Family history of heart disease  2. Mother - No history of Family history of heart disease  Social History  Smoking status Former smoker  Tobacco usage - No (Non-smoker for personal reasons (finding))  Review of systems  Constitutional No history of Weight Loss and Anorexia  Cardiovascular RAM  No history of Chest pain, Palpitations, Syncope, PND, Orthopnea, Edema and Claudication  Respiratory No history of SOB, Wheezing and Sputum  Hem/Lymphatic No history  of Easy bruising, Blood clots, Hx of blood transfusion, Anemia and Bleeding problems  Physical Examination  Constitutional 92%o2  Vitals Right Arm Sitting  / 66 mmHg, Pulse rate 51 bpm, Regular, Height in 5' 4\", BMI: 22.1, Weight in 129 lbs (or) 59 kgs and BSA : 1.63 cc/m²  General Appearance No Acute Distress  Respiratory Lungs clear with normal breath sounds  Cardiovascular Murmur and Regular rhythm. Normal rate present. Normal and normal S1 and S2    Lower Extremities No edema  Allergies  1.lisinopril - Ingredient(Reaction:cough, Severity:Mild)  Medications (Info obtained by: Verbal)  1.alendronate 70 MG Oral Tab, Take 1 tablet (70 mg total) by mouth once a week.  2.amiodarone 200 MG Oral Tab, Take 1 tablet (200 mg total) by mouth daily.  3.bumetanide 2 mg tablet, Take 1 tablet orally once a day.  4.carvediloL 3.125 mg tablet, Take 1 tablet orally 2 times a day.  5.Entresto 49 mg-51 mg tablet, Take 1 tablet orally 2 times a day.  6.Farxiga 10 mg tablet, Take 1 tablet orally once a day.  7.ferrous sulfate 325 (65 FE) MG Oral Tab EC, Take 1 tablet (325 mg total) by mouth daily with breakfast.  8.folic acid 800 MCG Oral Tab, Take 1 tablet (800 mcg total) by mouth daily.  9.gabapentin 300 mg capsule, Take 1 capsule orally 2 times a day.  10.HYDROcodone-acetaminophen (NORCO)  MG Oral Tab, Take 1 tablet by mouth every 6 (six) hours as needed for Pain.  11.isosorbide dinitrate 20 mg tablet, Take 1 tablet orally 3 times a day.  12.lidocaine 4 %-menthoL 1 % topical patch, (topical) once a day.  13.methotrexate 2.5 MG Oral Tab, TAKE 6 TABLETS BY MOUTH 1 TIME A WEEK  14.PANTOPRAZOLE 40 MG Oral Tab EC, TAKE 1 TABLET(40 MG) BY MOUTH TWICE DAILY BEFORE MEALS  15.spironolactone 25 mg tablet, HOLD Take 1 tablet orally once a day.  Impression  1.Acute on chronic combined systolic and diastolic CHF, NYHA class 4  2.Nonischemic cardiomyopathy  3.Anemia, unspecified  4.Chronic kidney insufficiency, stage 3  (moderate)  5.Severe tricuspid regurgitation  6.SOB (shortness of breath)  7.On amiodarone therapy  8.Atrial fibrillation (Afib), paroxysmal - A Fib  9.History of mitral valve replacement (MVR) - Hx  10.Benign essential HTN  Assessment & Plan  1.  Heart failure reduced ejection fraction-status post hospitalization for heart failure exacerbation after the patient was unable to obtain her diuretic for a number of days.  Home weights stable with minimal lower extremity edema on exam today.  She is taking bumetanide 2 mg daily.  She saw heart failure clinic yesterday and was prescribed Farxiga which she will start.  Has a consultation with Dr. Sargent to discuss possible upgrade to dual-chamber ICD.  2.  Nonischemic cardiomyopathy-ejection fraction improved to 30 to 35% on recent echocardiogram.  Continue guideline directed medical therapy with Entresto, carvedilol, Farxiga and aldosterone antagonist.  3.  Shortness of breath with exertion-primary care physician has referred her to a pulmonologist.  Lungs are clear on exam today.  She is severely deconditioned and recommended she discuss physical therapy at home with her primary care physician.  Discussed chair exercises as well.  4.  Paroxysmal atrial fibrillation-not a candidate for anticoagulation secondary to prior GI bleed.  Continue amiodarone 200 mg daily and consideration for left atrial appendage occlusion in the future.  5.  Anemia-management per Dr. Arvizu on aranesp.  6.  Tricuspid valve regurgitation-severe with wide-open tricuspid valve.    Plan:  Continue present management  Discuss possible physical therapy with your primary care physician  Try to be a little more active at home as you are able  Follow-up Dr. Sargent in the heart failure clinic as scheduled  Contact us for the heart failure clinic if you notice increased swelling in your ankles or weight gain of 3 pounds overnight  Future appointments  1.Referral Visit - Johnathon Rodriguez  ([email or web address]) : (Today)  Miscellaneous  1.Weight monitoring (regime/therapy)  Nurses documentation  EKG: None  Refills: None  Upcoming surgeries: None  Use of assistive devices(s): None   Patient instructions  Plan:  Continue present management  Discussed possible physical therapy with your primary care physician  Try to be a little more active at home as you are able  Follow-up Dr. Sargent in the heart failure clinic as scheduled  Contact us for the heart failure clinic if you notice increased swelling in your ankles or weight gain of 3 pounds overnight  Lab Details  POCT CREATININE  04/11/2024 10:41:25 AM  POCT CREATININE 1.90 0.55-1.02 mg/dL H F  E GFR CR 27 >=60 mL/min/1.73m2 L F  BASIC METABOLIC PANEL (8)  09/25/2023 02:58:50 PM  GLUCOSE 104 70-99 mg/dL H F  SODIUM 139 136-145 mmol/L  F  POTASSIUM 4.0 3.5-5.1 mmol/L  F  CHLORIDE 106  mmol/L  F  CO2 20.0 21.0-32.0 mmol/L L F  ANION GAP 13 0-18 mmol/L  F  BUN 18 7-18 mg/dL  F  CREATININE 1.47 0.55-1.02 mg/dL H F  BUN/ CREAT RATIO 12.2 10.0-20.0  F  CALCIUM 9.6 8.5-10.1 mg/dL  F  OSMOLALITY CALCULATED 290 275-295 mOsm/kg  F  E GFR CR 37 >=60 mL/min/1.73m2 L F  FASTING PATIENT BMP ANSWER Yes   F  MRSA SCREEN BY PCR  09/25/2023 02:53:49 PM  MRSA SCREEN BY PCR Negative Negative  F  RBC MORPHOLOGY SCAN  09/25/2023 02:28:46 PM  MORPHOLOGY See morphology below Normal, Slide reviewed, see previous RBC morphology. A F  PLATELET MORPHOLOGY Normal Normal  F  MACROCYTOSIS 1+   F  POLYCHROMASIA 1+   F  OVALOCYTES 1+   F  CBC W/ DIFFERENTIAL  09/25/2023 02:27:39 PM  WBC 3.8 4.0-11.0 x10(3) uL L F  RED BLOOD COUNT 3.29 3.80-5.30 x10(6)uL L F  HGB 10.1 12.0-16.0 g/dL L F  HCT 33.0 35.0-48.0 % L F  MEAN CELL VOLUME 100.3 80.0-100.0 fL H F  MEAN CORPUSCULAR HEMOGLOBIN 30.7 26.0-34.0 pg  F  MEAN CORPUSCULAR HGB CONC 30.6 31.0-37.0 g/dL L F  RED CELL DISTRIBUTION WIDTH-SD 67.4 35.1-46.3 fL H F  RED CELL DISTRIBUTION WIDTH CV 19.2 11.0-15.0  % H F  PLATELETS 190.0 150.0-450.0 10(3)uL  F  NEUTROPHIL ABS PRELIM 2.31 1.50-7.70 x10 (3) uL  F  NEUTROPHIL ABSOLUTE 2.31 1.50-7.70 x10(3) uL  F  LYMPHOCYTE ABSOLUTE 0.94 1.00-4.00 x10(3) uL L F  MONOCYTE ABSOLUTE 0.31 0.10-1.00 x10(3) uL  F  EOSINOPHIL ABSOLUTE 0.14 0.00-0.70 x10(3) uL  F  BASOPHIL ABSOLUTE 0.05 0.00-0.20 x10(3) uL  F  IMMATURE GRANULOCYTE COUNT 0.02 0.00-1.00 x10(3) uL  F  NEUTROPHIL % 61.4 %  F  LYMPHOCYTE % 24.9 %  F  MONOCYTE % 8.2 %  F  EOSINOPHIL % 3.7 %  F  BASOPHIL % 1.3 %  F  IMMATURE GRANULOCYTE RATIO % 0.5 %  F  PROTHROMBIN TIME (PT)  09/25/2023 01:40:46 PM  PROTIME 18.9 11.6-14.8 seconds H F  INR 1.61 0.85-1.16 H F  Diagnostics Details  Trans Thoracic Echocardiogram 09/01/2023  1.The left ventricle is normal in size. The left ventricular wall thickness is normal. Global left ventricular systolic function is moderately decreased. The left ventricular ejection fraction is 37%. Regional wall motion analysis shows dyskinesis of mid anteroseptal segment and mid inferoseptal segment. Wall motion score index is 4. Left ventricular diastolic function is indeterminate.    2.Right ventricular systolic function is severely decreased.    3.The left atrial diameter is mildly increased.    4.The right atrium is mildly enlarged.    5.Severe (4+) tricuspid regurgitation.    6.A bio prosthetic valve is noted at the mitral location. The mean trans mitral gradient is 5.0 mmHg.    7.The pulmonary artery systolic pressure is 40 mmHg.    Lower Extremity Arterial Ultrasound 09/22/2022  1.The study quality is good.    2.Bilateral scattered plaque in the lower extremity arterial system causing less than 50% stenosis.    3.Bilateral lower extremity arterial system demonstrates tri-phasic & bi-phasic waveforms.    4.Left anterior tibial artery demonstrates low velocity flow.    5.Three vessel runoff to the feet bilaterally.    Care Providers: Gail MARIEE and Carla Fitzpatrick CCMA  Electronically  Authenticated by  Gail MARIEE  07/02/2024 02:29:24 PM  Disclaimer: Components of this note were documented using voice recognition system and are subject to errors not corrected at proofreading. Contact the author of this note for any clarifications.

## 2024-07-15 NOTE — HISTORICAL OFFICE NOTE
[facility]  [address] [facility] [facility], [address] [place] [address]  [phone]      Margaret Silverio  Progress Note  Demographics:  Name: Margaret Silverio YOB: 1945  Age: 79, Female Medical Record No: [id number]  Visited Date/Time: 04/01/2024 01:20 PM    Chief Complaints  follow up  SOB, heart palp   History of Present Illness  79-year-old female being followed for nonischemic cardiomyopathy, normal right and left heart cath October 2022.  History of possible TIA, atrial flutter, mitral valve replacement on Eliquis.  GIB admission requiring ICU admission April 2023.  Dual-chamber ICD placed September 2023.    Has been having worsening palpitations and shortness of breath, noted to have paroxysmal atrial fibrillation on her monitor therefore asked to come in today.  Denies weight gain, lower extremity edema, orthopnea or PND.  Denies syncope associated with palpitations.  Cardiac risk factors Never smoked  Past Medical History  1.On amiodarone therapy  2.Atrial fibrillation (Afib), paroxysmal - A Fib  3.Claudication  4.ACC/AHA stage B systolic heart failure  5.History of mitral valve replacement (MVR) - Hx  6.Benign essential HTN  Past Surgical History  1.Automatic implantable cardiac defibrillator (S/P AICD)  2.History of mitral valve replacement (MVR) - Hx  Family History  1. Father - No history of Family history of heart disease  2. Mother - No history of Family history of heart disease  Social History  Smoking status Never smoked  Tobacco usage - No (Non-smoker for personal reasons (finding))  Review of systems  Cardiovascular Edema  No history of Chest pain, RAM, Palpitations, Syncope, PND, Orthopnea and Claudication  Respiratory No history of SOB, Wheezing and Sputum  Hem/Lymphatic No history of Easy bruising, Blood clots, Hx of blood transfusion, Anemia and Bleeding problems  Physical Examination  Constitutional 98%O2  Vitals Left Arm Sitting  / 72 mmHg, Pulse rate 61 bpm,  Height in 5' 4\", BMI: 21.5, Weight in 125 lbs (or) 57 kgs and BSA : 1.6 cc/m²  General Appearance No Acute Distress and Well groomed  Cardiovascular   EKG/Other abnormalities  General: NAD  HEENT: Normocephalic, anicteric sclera, neck supple.  Neck: No JVD, carotids 2+, no bruits.  Cardiac: Regular rate and rhythm. S1, S2 normal. No murmur, pericardial rub, S3.  Lungs: Clear without wheezes, rales, rhonchi or dullness.  Normal excursions and effort.  Abdomen: Soft, non-tender. BS-present.  Extremities: Without clubbing, cyanosis or edema.  Peripheral pulses are 2+.  Neurologic: Non-focal  Skin: Warm and dry.  Allergies  1.lisinopril - Ingredient(Reaction:cough, Severity:Mild)  Medications (Info obtained by: Verbal)  1.alendronate 70 mg tablet, Take 1 tablet orally once a week  2.amiodarone 200 mg tablet, Take 1 tablet orally once a day.  3.amitriptyline 10 mg tablet, Take 1 tablet orally once a day as needed.  4.carvediloL 12.5 mg tablet, Take 1 tablet orally 2 times a day.  5.Entresto 49 mg-51 mg tablet, Take 1 tablet orally 2 times a day.  6.folic acid 800 mcg tablet, Take 1 tablet orally once a day.  7.metHOTREXate sodium 2.5 mg tablet, Take 6 tablets orally once a week  8.pantoprazole 40 mg tablet,delayed release, Take 1 tablet orally once a day.  9.bumetanide 1 mg tablet, Take 1 tablet orally once a day.  10.spironolactone 25 mg tablet, Take 1 tablet orally once a day.  Impression  1.On amiodarone therapy  2.Automatic implantable cardiac defibrillator (S/P AICD)  3.Atrial fibrillation (Afib), paroxysmal - A Fib  4.Claudication  5.ACC/AHA stage B systolic heart failure  6.History of mitral valve replacement (MVR) - Hx  7.Benign essential HTN  Cardiac history:  HFrEF: Nonischemic cardiomyopathy based on left and right heart cath October 2022  -Echocardiogram October 2022 EF 35%, bioprosthetic mitral valve gradient 6 mmHg, severe tricuspid regurgitation  -Right heart cath October 2022 normal filling pressures,  cardiac index however elevated systemic blood pressures    History of mitral valve replacement in [place] 29 mm Lazo valve  -Echo Oct 2022 MG 6 mmHg    GIB admission April 2023, 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  2 week admission, requiring ICU care.    Hypertension, hyperlipidemia  Assessment & Plan  HFrEF: Severely reduced ejection fraction, NYHA class III symptoms.  Nonischemic cardiomyopathy.  Ejection fraction persistently reduced despite medical optimization, DC ICD placed Sept 2023.  Now having shortness of breath and palpitations likely due to paroxysmal atrial fibrillation, burden has increased to 38% despite amiodarone.  Also volume overloaded.  DDx includes PE, amio toxicity as well therefore CT chest.  Lastly worsening TR and RV failure however this would not explain drop in LV EF.  -restart bumex 1 mg daily and spironolactone 25 mg daily  -BMP later this week  -CT pulmonary artery angiogram    Paroxsymal AF:  Worsening RAM and palpitations.  Likely due to paroxysmal A-fib, by exam seems to be in normal rhythm.  evaluate lungs as above given she's on amiodarone.    CVA/TIA: Admission November 2022 with possible TIA however MRI negative.  She did have atrial flutter at that time.  We will continue Eliquis for now however needs Watchman eval due to GIB.  Dr. Sargent appointment as scheduled June 16.    Claudication: Nonobstructive anatomy on ultrasound, continue medical management    MVR: Mean gradient 6 across bioprosthetic mitral valve, continue routine echocardiogram follow-ups.  Continue Eliquis.    Hypertension:  Controlled on current regimen, no changes    Follow-up:  Clinic: 4 weeks, Dr. Sargent appointment to 2 to 3 weeks  Testing/intervention: EP, BMP, CT, meds    recommend low sodium diet, daily exercise and maintain a normal BMI. Recommend monitor blood pressure at home.    Patient with diagnosis of Heart Failure and EF less than 40%, consider an ACE or ARB.  Labs and Diagnostics ordered  1.BMP  (Basic Metabolic Panel) (1 Week)  Future appointments  1.Referral Visit - Johnathonjoby Rodriguez ([email or web address]) : (Today)  2.Follow up visit - Twin Boles MD (4 Weeks)  3.Follow up visit - Luis Sargent MD (3 Weeks)  Miscellaneous  1.Reviewed lower extremity arterial ultrasound, trans thoracic echocardiogram with the patient.  2.Weight monitoring (regime/therapy)  Nurses documentation  EKG: None  Refills: None   Upcoming surgeries: None  Use of assistive device(s): None      Patient instructions  -restart bumex 1 mg daily and spironolactone 25 mg daily  -BMP later this week  -CT pulmonary artery angiogram  Follow-up:  Clinic: 4 weeks, Dr. Sargent appointment to 2 to 3 weeks  Testing/intervention: EP, BMP, CT, meds    recommend low sodium diet, daily exercise and maintain a normal BMI. Recommend monitor blood pressure at home.    Patient with diagnosis of Heart Failure and EF less than 40%, consider an ACE or ARB.  Lab Details  BASIC METABOLIC PANEL (8)  09/25/2023 02:58:50 PM  GLUCOSE 104 70-99 mg/dL H F  SODIUM 139 136-145 mmol/L  F  POTASSIUM 4.0 3.5-5.1 mmol/L  F  CHLORIDE 106  mmol/L  F  CO2 20.0 21.0-32.0 mmol/L L F  ANION GAP 13 0-18 mmol/L  F  BUN 18 7-18 mg/dL  F  CREATININE 1.47 0.55-1.02 mg/dL H F  BUN/ CREAT RATIO 12.2 10.0-20.0  F  CALCIUM 9.6 8.5-10.1 mg/dL  F  OSMOLALITY CALCULATED 290 275-295 mOsm/kg  F  E GFR CR 37 >=60 mL/min/1.73m2 L F  FASTING PATIENT BMP ANSWER Yes   F  MRSA SCREEN BY PCR  09/25/2023 02:53:49 PM  MRSA SCREEN BY PCR Negative Negative  F  RBC MORPHOLOGY SCAN  09/25/2023 02:28:46 PM  MORPHOLOGY See morphology below Normal, Slide reviewed, see previous RBC morphology. A F  PLATELET MORPHOLOGY Normal Normal  F  MACROCYTOSIS 1+   F  POLYCHROMASIA 1+   F  OVALOCYTES 1+   F  CBC W/ DIFFERENTIAL  09/25/2023 02:27:39 PM  WBC 3.8 4.0-11.0 x10(3) uL L F  RED BLOOD COUNT 3.29 3.80-5.30 x10(6)uL L F  HGB 10.1 12.0-16.0 g/dL L F  HCT 33.0 35.0-48.0 % L F  MEAN CELL  VOLUME 100.3 80.0-100.0 fL H F  MEAN CORPUSCULAR HEMOGLOBIN 30.7 26.0-34.0 pg  F  MEAN CORPUSCULAR HGB CONC 30.6 31.0-37.0 g/dL L F  RED CELL DISTRIBUTION WIDTH-SD 67.4 35.1-46.3 fL H F  RED CELL DISTRIBUTION WIDTH CV 19.2 11.0-15.0 % H F  PLATELETS 190.0 150.0-450.0 10(3)uL  F  NEUTROPHIL ABS PRELIM 2.31 1.50-7.70 x10 (3) uL  F  NEUTROPHIL ABSOLUTE 2.31 1.50-7.70 x10(3) uL  F  LYMPHOCYTE ABSOLUTE 0.94 1.00-4.00 x10(3) uL L F  MONOCYTE ABSOLUTE 0.31 0.10-1.00 x10(3) uL  F  EOSINOPHIL ABSOLUTE 0.14 0.00-0.70 x10(3) uL  F  BASOPHIL ABSOLUTE 0.05 0.00-0.20 x10(3) uL  F  IMMATURE GRANULOCYTE COUNT 0.02 0.00-1.00 x10(3) uL  F  NEUTROPHIL % 61.4 %  F  LYMPHOCYTE % 24.9 %  F  MONOCYTE % 8.2 %  F  EOSINOPHIL % 3.7 %  F  BASOPHIL % 1.3 %  F  IMMATURE GRANULOCYTE RATIO % 0.5 %  F  PROTHROMBIN TIME (PT)  09/25/2023 01:40:46 PM  PROTIME 18.9 11.6-14.8 seconds H F  INR 1.61 0.85-1.16 H F  Diagnostics Details  Trans Thoracic Echocardiogram 09/01/2023  1.The left ventricle is normal in size. The left ventricular wall thickness is normal. Global left ventricular systolic function is moderately decreased. The left ventricular ejection fraction is 37%. Regional wall motion analysis shows dyskinesis of mid anteroseptal segment and mid inferoseptal segment. Wall motion score index is 4. Left ventricular diastolic function is indeterminate.    2.Right ventricular systolic function is severely decreased.    3.The left atrial diameter is mildly increased.    4.The right atrium is mildly enlarged.    5.Severe (4+) tricuspid regurgitation.    6.A bio prosthetic valve is noted at the mitral location. The mean trans mitral gradient is 5.0 mmHg.    7.The pulmonary artery systolic pressure is 40 mmHg.    Lower Extremity Arterial Ultrasound 09/22/2022  1.The study quality is good.    2.Bilateral scattered plaque in the lower extremity arterial system causing less than 50% stenosis.    3.Bilateral lower extremity arterial system demonstrates  tri-phasic & bi-phasic waveforms.    4.Left anterior tibial artery demonstrates low velocity flow.    5.Three vessel runoff to the feet bilaterally.    CPOE Orders carried out by: Carleen MURILLO  Care Providers: Twin Boles MD, Carleen MURILLO, Travis MURILLO and Paula Yepez  Electronically Authenticated by  Twin Boles MD  04/02/2024 09:57:31 AM  Disclaimer: Components of this note were documented using voice recognition system and are subject to errors not corrected at proofreading. Contact the author of this note for any clarifications.

## 2024-07-15 NOTE — CONSULTS
[facility]    PATIENT'S NAME: DORIS ALEMAN   ATTENDING PHYSICIAN: Kishan Clark MD   CONSULTING PHYSICIAN: Theodore Lr MD   PATIENT ACCOUNT#:   [id number]    LOCATION:  [address]  MEDICAL RECORD #:   [id number]       YOB: 1945  ADMISSION DATE:       07/15/2024      CONSULT DATE:  07/15/2024    REPORT OF CONSULTATION      HISTORY OF PRESENT ILLNESS:  The patient is a 79-year-old female who came to the emergency room with severe weakness and progressive shortness of breath.  She is only able to take a few steps with a walker and with assistance at home.  She is unable to stand for any length of time.  She has been admitted to the hospital with recurrent heart failure over the last several months.    PAST MEDICAL HISTORY:  Nonischemic cardiomyopathy.  Cardiac catheterization in 2022 showed normal coronary arteries.  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      PAST SURGICAL HISTORY:  Mitral valve replacement with left atrial appendage closure.  She has also had bilateral total shoulder arthroplasty.    MEDICATIONS:  Home medications:  Isordil 20 t.i.d., gabapentin 300 t.i.d., Farxiga 10 mg q.a.m., spironolactone 25 mg q.a.m., Bumex 2 mg q.a.m., Fosamax, methotrexate 6 tablets weekly, pantoprazole, carvedilol 3.125 b.i.d., Entresto 49/51 one tablet twice daily, folic acid, amiodarone 200 mg daily, ferrous sulfate.    ALLERGIES:  Lisinopril.    SOCIAL HISTORY:  Former smoker in the remote past.  No alcohol or tobacco at this time.  She is  and lives with her .   and daughter are with her in the ER today.    FAMILY HISTORY:  Negative for premature coronary disease.      PHYSICAL EXAMINATION:    GENERAL:  Well-developed, thin elderly female, appears very weak but in no acute distress.  VITAL SIGNS:  Blood pressure  120/60; respirations 20, unlabored; pulse 60, regular rhythm; afebrile; saturating at 97% on room air.  HEENT:  Unremarkable.  NECK:  Supple.  Jugular venous pressure is increased.  Carotids are brisk.  No bruits.  LUNGS:  Decreased breath sounds, left base; crackles in right.  HEART:  S1 and S2 are normal.  Grade 2 to 3 over 6 holosystolic murmur.  No S3.  ABDOMEN:  Soft, nontender.  Questionable liver edge.  EXTREMITIES:  Warm and dry with no edema.    LABORATORY DATA:  Sodium 142; potassium 5.3; BUN 51; creatinine 2.27, which is above her baseline; GFR 22.  BNP 1944.  WBC 1.5, which is below her baseline but similar to what she had on July 1; hemoglobin 9.6; platelets 221.    EKG shows atrial and ventricular pacing.    Chest x-ray shows a large left pleural effusion.    ASSESSMENT:    1.   Right-sided heart failure.  Severe weakness and shortness of breath.  Large left pleural effusion.    2.   History of nonischemic dilated cardiomyopathy with severe left ventricular dysfunction, recently worsened.  3.   History of atrial flutter, currently AV paced.  Not on anticoagulation, but had left atrial appendage closure.    4.   History of gastrointestinal bleeding with stable hemoglobin.    5.   Severe neutropenia, cause?    6.   Chronic kidney disease stage 3 to 4.    PLAN:    1.   Diurese with IV Lasix.    2.   IV dobutamine to improve cardiac output and promote diuresis.    3.   Continue home heart failure medications as tolerated.  4.   Would benefit from thoracentesis of the left pleural effusion.    Thank you for this consultation.    Dictated By Theodore Lr MD  d: 07/15/2024 17:58:57  t: 07/15/2024 18:07:27  [facility] [id number]  [facility] [place]/

## 2024-07-15 NOTE — HISTORICAL OFFICE NOTE
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 Shnae for heart failure with reduced ejection fraction.    Was in the hospital last month with the severe GI bleeding anemia treated heart failure and EF 30% on echo January 2024.  We did do a CTA at the time to visualize left atrial appendage and it appeared to be occluded probably surgically at the time mitral valve surgery.  Still awaiting surgical records from [place].  Peers like does not need anticoagulation nor candidate or need for any watchman despite the atrial fibrillation given the left atrial appendage appears to be stable occluded likely.    Comes in with family also reports edema taking Lasix daily was diuresed in the hospital with IV diuretics.  Does have follow-up with primary care regarding anemia and is no longer on anticoagulation.    Previously noted: Her ejection fraction has continued at 33% despite goal-directed medical therapy greater than 90 days.  She is class II heart failure and a narrow QRS.  She does have paroxysmal atrial fibrillation a long IL.  EKG 2 months ago with sinus rhythm current one looks probably sinus with very long IL with ectopy.  She has shortness of breath it 2 flights of stairs and 2 blocks but no PND orthopnea noted.    Patient has a history of possible TIA although she says it was a seizure back in November 2022 also had a GI bleed in April 2023 but is back on her Eliquis but with history of these issues high risk for anticoagulation would consider for watchman.  Talked  about some of these issues she reported that she is doing fine on the blood thinners at this point is rather vague with a history with a GI bleed that required her to be in the ICU April 2023 also vague about the possible TIA history and was not sure about that back in November 2022 with or without was just a seizure.  Does have history of mitral valve replacement and EF has been fluctuating and last dropped about 35% and medications were adjusted.  Cardiac risk factors Never smoked  Past Medical History  1.On amiodarone therapy  2.Atrial fibrillation (Afib), paroxysmal - A Fib  3.Claudication  4.ACC/AHA stage B systolic heart failure  5.History of mitral valve replacement (MVR) - Hx  6.Benign essential HTN  Past Surgical History  1.Automatic implantable cardiac defibrillator (S/P AICD)  2.History of mitral valve replacement (MVR) - Hx  Family History  1. Father - No history of Family history of heart disease  2. Mother - No history of Family history of heart disease  Social History  Smoking status Never smoked  Tobacco usage - No (Non-smoker (finding))  Review of systems  Eyes No history of Blurry vision, Visual changes and Double vision  Cardiovascular Edema  No history of Chest pain, RAM, Palpitations, Syncope, PND, Orthopnea and Claudication  Respiratory No history of SOB, Wheezing and Sputum  Hem/Lymphatic No history of Easy bruising, Blood clots, Hx of blood transfusion, Anemia and Bleeding problems  Physical Examination  Constitutional 99%o2  Vitals Left Arm Sitting  / 62 mmHg, Pulse rate 60 bpm, Regular, Height in 5' 4\", BMI: 24.9, Weight in 145 lbs (or) 66 kgs and BSA : 1.73 cc/m²  General Appearance No Acute Distress and Appropriate  Head/Eyes/Ears/Nose/Mouth/Throat Conjunctiva pink, Sclera Clear and Mucous membranes Moist  Neck Normal carotid pulsations  Respiratory Unlabored and Equal bilaterally  Cardiovascular Regular rhythm. Normal and normal S1 and S2    Gastrointestinal Abdomen soft,  Non-tender and Normoactive bowel sounds  Musculoskeletal Normal spine  Gait Normal gait  Strength and tone Normal muscle strength  Upper Extremities No clubbing and No cyanosis  Lower Extremities Pulses 2+ and equal bilaterally and No edema  Skin Warm and dry and No rashes or lesions  Neurologic / Psychiatric Alert and Oriented  Speech Normal speech  Allergies  1.lisinopril - Ingredient(Reaction:cough, Severity:Mild)  Medications (Info obtained by: Verbal)  1.alendronate 70 mg tablet, Take 1 tablet orally once a week  2.amiodarone 200 mg tablet, Take 1 tablet orally 2 times a day.  3.amitriptyline 10 mg tablet, Take 1 tablet orally once a day as needed.  4.carvediloL 12.5 mg tablet, Take 1 tablet orally 2 times a day.  5.Entresto 49 mg-51 mg tablet, Take 1 tablet orally 2 times a day.  6.folic acid 800 mcg tablet, Take 1 tablet orally once a day.  7.metHOTREXate sodium 2.5 mg tablet, Take 6 tablets orally once a week  8.pantoprazole 40 mg tablet,delayed release, Take 1 tablet orally once a day.  Impression  1.On amiodarone therapy  2.Automatic implantable cardiac defibrillator (S/P AICD)  3.Atrial fibrillation (Afib), paroxysmal - A Fib  4.Claudication  5.ACC/AHA stage B systolic heart failure  6.History of mitral valve replacement (MVR) - Hx  7.Benign essential HTN  Assessment & Plan  Heart failure with reduced ejection fraction  Echo EF 38% January 2024  EF 33% nonischemic with no coronary disease at cath despite goal-directed medical therapy   Follows with Dr. Shane March 11    Atrial fibrillation, paroxysmal  Amiodarone started December 2023  Does have elevated TSH but normal T4 recheck labs in 3 months and decrease amiodarone dose  He had paroxysmal atrial fibrillation that was noted during hospitalization with possible TIA in the fall 2022  15% burden of A-fib on device  No longer on anticoagulation due to severe life-threatening GI bleed and now left atrial appendage is shown to be likely occluded surgically  and does not need long-term anticoagulation likely  [id number] score equal to 5    Plan  Decrease amiodarone to 200 mg only once a day  Thyroid function labs in 3 months  Follow-up myself in 6 months  Follow myself also annually during device check  Annual follow-up in the device clinic  Routine follow-up with Dr. Boles as scheduled March 11  Medications Ordered  1.amiodarone 200 mg tablet, Take 1 tablet orally once a day.  Labs and Diagnostics ordered  1.EKG (electrocardiogram) (Today)  2.Thyroid Function Panel (3 Months)  Future appointments  1.Referral Visit - Johnathon Rodriguez ([email or web address]) : (Today)  2.Follow up visit - Luis Sargent MD w device (1 Year)  3.Follow up visit - Luis Sargent MD (6 Months)  Miscellaneous  1.Weight monitoring (regime/therapy)  2.Reviewed trans thoracic echocardiogram, lower extremity arterial ultrasound with the patient.  3.Reviewed creatinine with the patient.  Nurses documentation  Upcoming surgeries: None  Refills: None  EKG: None  Use of assistive devices(s): None     Patient instructions  Decrease amiodarone to 200 mg only once a day  Thyroid function labs in 3 months  Follow-up myself in 6 months  Follow myself also annually during device check  Annual follow-up in the device clinic  Routine follow-up with Dr. Boles as scheduled March 11  Lab Details  BASIC METABOLIC PANEL (8)  09/25/2023 02:58:50 PM  GLUCOSE 104 70-99 mg/dL H F  SODIUM 139 136-145 mmol/L  F  POTASSIUM 4.0 3.5-5.1 mmol/L  F  CHLORIDE 106  mmol/L  F  CO2 20.0 21.0-32.0 mmol/L L F  ANION GAP 13 0-18 mmol/L  F  BUN 18 7-18 mg/dL  F  CREATININE 1.47 0.55-1.02 mg/dL H F  BUN/ CREAT RATIO 12.2 10.0-20.0  F  CALCIUM 9.6 8.5-10.1 mg/dL  F  OSMOLALITY CALCULATED 290 275-295 mOsm/kg  F  E GFR CR 37 >=60 mL/min/1.73m2 L F  FASTING PATIENT BMP ANSWER Yes   F  MRSA SCREEN BY PCR  09/25/2023 02:53:49 PM  MRSA SCREEN BY PCR Negative Negative  F  RBC MORPHOLOGY SCAN  09/25/2023 02:28:46 PM  MORPHOLOGY See  morphology below Normal, Slide reviewed, see previous RBC morphology. A F  PLATELET MORPHOLOGY Normal Normal  F  MACROCYTOSIS 1+   F  POLYCHROMASIA 1+   F  OVALOCYTES 1+   F  CBC W/ DIFFERENTIAL  09/25/2023 02:27:39 PM  WBC 3.8 4.0-11.0 x10(3) uL L F  RED BLOOD COUNT 3.29 3.80-5.30 x10(6)uL L F  HGB 10.1 12.0-16.0 g/dL L F  HCT 33.0 35.0-48.0 % L F  MEAN CELL VOLUME 100.3 80.0-100.0 fL H F  MEAN CORPUSCULAR HEMOGLOBIN 30.7 26.0-34.0 pg  F  MEAN CORPUSCULAR HGB CONC 30.6 31.0-37.0 g/dL L F  RED CELL DISTRIBUTION WIDTH-SD 67.4 35.1-46.3 fL H F  RED CELL DISTRIBUTION WIDTH CV 19.2 11.0-15.0 % H F  PLATELETS 190.0 150.0-450.0 10(3)uL  F  NEUTROPHIL ABS PRELIM 2.31 1.50-7.70 x10 (3) uL  F  NEUTROPHIL ABSOLUTE 2.31 1.50-7.70 x10(3) uL  F  LYMPHOCYTE ABSOLUTE 0.94 1.00-4.00 x10(3) uL L F  MONOCYTE ABSOLUTE 0.31 0.10-1.00 x10(3) uL  F  EOSINOPHIL ABSOLUTE 0.14 0.00-0.70 x10(3) uL  F  BASOPHIL ABSOLUTE 0.05 0.00-0.20 x10(3) uL  F  IMMATURE GRANULOCYTE COUNT 0.02 0.00-1.00 x10(3) uL  F  NEUTROPHIL % 61.4 %  F  LYMPHOCYTE % 24.9 %  F  MONOCYTE % 8.2 %  F  EOSINOPHIL % 3.7 %  F  BASOPHIL % 1.3 %  F  IMMATURE GRANULOCYTE RATIO % 0.5 %  F  PROTHROMBIN TIME (PT)  09/25/2023 01:40:46 PM  PROTIME 18.9 11.6-14.8 seconds H F  INR 1.61 0.85-1.16 H F  Diagnostics Details  Trans Thoracic Echocardiogram 09/01/2023  1.The left ventricle is normal in size. The left ventricular wall thickness is normal. Global left ventricular systolic function is moderately decreased. The left ventricular ejection fraction is 37%. Regional wall motion analysis shows dyskinesis of mid anteroseptal segment and mid inferoseptal segment. Wall motion score index is 4. Left ventricular diastolic function is indeterminate.    2.Right ventricular systolic function is severely decreased.    3.The left atrial diameter is mildly increased.    4.The right atrium is mildly enlarged.    5.Severe (4+) tricuspid regurgitation.    6.A bio prosthetic valve is noted at the  mitral location. The mean trans mitral gradient is 5.0 mmHg.    7.The pulmonary artery systolic pressure is 40 mmHg.    Lower Extremity Arterial Ultrasound 09/22/2022  1.The study quality is good.    2.Bilateral scattered plaque in the lower extremity arterial system causing less than 50% stenosis.    3.Bilateral lower extremity arterial system demonstrates tri-phasic & bi-phasic waveforms.    4.Left anterior tibial artery demonstrates low velocity flow.    5.Three vessel runoff to the feet bilaterally.    CPOE Orders carried out by: Mana Cardoso RN and Rosa Ugalde  Care Providers: Luis Sargent MD, Mana Cardoso RN, Africa SANTA, Ignacio Jiang RN and Rosa Ugalde  Electronically Authenticated by  Luis Sargent MD  01/29/2024 04:47:57 PM  Disclaimer: Components of this note were documented using voice recognition system and are subject to errors not corrected at proofreading. Contact the author of this note for any clarifications.

## 2024-07-15 NOTE — H&P
[facility]    PATIENT'S NAME: DORIS ALEMAN   ATTENDING PHYSICIAN: Kishan Clark MD   PATIENT ACCOUNT#:   [id number]    LOCATION:  [address]  MEDICAL RECORD #:   [id number]       YOB: 1945  ADMISSION DATE:       07/15/2024    HISTORY AND PHYSICAL EXAMINATION    CHIEF COMPLAINT:  Recurrent acute on chronic systolic heart failure with large bilateral pleural effusions.    HISTORY OF PRESENT ILLNESS:  The patient is a 79-year-old  female with underlying severe nonischemic cardiomyopathy and multiple recent hospitalizations for heart failure, came in today to the emergency department with recurrent shortness of breath for at least 1 week.  She said she has been taking her Bumex.  Sometimes she skips 1 dose or 2 because she is incontinent.  CBC today showed white blood cell count of 1.5, platelet count 221, hemoglobin 9.6 which is at baseline, .0.  Chemistry and B-natriuretic peptide, both are still pending.  Chest x-ray showed bilateral large pleural effusions, worse on the left than the right.     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    PAST SURGICAL HISTORY:  Mitral valve replacement with atrial appendage closure, bilateral total shoulder arthroplasty.    MEDICATIONS:  Please see medication reconciliation list.    FAMILY HISTORY:  Positive for hypertension.    SOCIAL HISTORY:  Ex-tobacco user.  No current tobacco, alcohol, or drug use.  Lives with her .  At baseline, requires assistance in her basic activities of daily living.    REVIEW OF SYSTEMS:  Progressive  dyspnea even on exertion.  The patient feels that she cannot catch her breath.  She had poor appetite with progressive weight loss.  No fever or chills.  Other 12-point review of systems negative.      PHYSICAL EXAMINATION:    GENERAL:  Alert, oriented to time, place, and person, visibly dyspneic, mild to moderate distress.  VITAL SIGNS:  Temperature 97.6, pulse 60, respiratory rate 20, blood pressure 112/62, pulse ox 99% on 2L nasal cannula oxygen.  HEENT:  Atraumatic.  Oropharynx clear.  Dry mucous membranes.  Normal hard and soft palate.  Eyes:  Anicteric sclerae.   NECK:  Supple.  No lymphadenopathy.  Positive jugular venous distention.   LUNGS:  Diminished breathing sounds both lung bases.  HEART:  Regular rate and rhythm.  S1 and S2 auscultated.  No murmur.  ABDOMEN:  Soft, nondistended.  No tenderness.  Positive bowel sounds.  EXTREMITIES:  Trace edema.  No clubbing or cyanosis.  NEUROLOGIC:  Motor and sensory intact.     ASSESSMENT AND PLAN:    1.   Recurrent acute on chronic systolic heart failure.  2.   Bilateral left more than right pleural effusions with pulmonary edema.  3.   Rheumatoid arthritis.  4.   Chronic kidney disease stage 3 to 4.    The patient will be admitted to telemetry floor.  Cardiology to evaluate the patient and possibly initiate IV Bumex and milrinone drip.  The patient was given IV Bumex in the emergency room.  We will continue to monitor her hemodynamic status.  Monitor kidney function and electrolytes.    Dictated By Azul Sahni MD  d: 07/15/2024 15:44:21  t: 07/15/2024 15:50:00  Job [id number]  FB/

## 2024-07-15 NOTE — ED INITIAL ASSESSMENT (HPI)
Patient presents to the ED c/o heart palpitations and SOB since yesterday. Pt wears 2L nasal cannula at baseline.

## 2024-07-16 ENCOUNTER — APPOINTMENT (OUTPATIENT)
Dept: ULTRASOUND IMAGING | Facility: HOSPITAL | Age: 79
End: 2024-07-16
Attending: INTERNAL MEDICINE
Payer: MEDICARE

## 2024-07-16 ENCOUNTER — APPOINTMENT (OUTPATIENT)
Dept: GENERAL RADIOLOGY | Facility: HOSPITAL | Age: 79
End: 2024-07-16
Attending: INTERNAL MEDICINE
Payer: MEDICARE

## 2024-07-16 ENCOUNTER — APPOINTMENT (OUTPATIENT)
Dept: PICC SERVICES | Facility: HOSPITAL | Age: 79
End: 2024-07-16
Attending: HOSPITALIST
Payer: MEDICARE

## 2024-07-16 LAB
ANION GAP SERPL CALC-SCNC: 8 MMOL/L (ref 0–18)
BASOPHILS # BLD AUTO: 0.02 X10(3) UL (ref 0–0.2)
BASOPHILS NFR BLD AUTO: 1.1 %
BASOPHILS NFR PLR: 0 %
BUN BLD-MCNC: 46 MG/DL (ref 9–23)
BUN/CREAT SERPL: 23.5 (ref 10–20)
CALCIUM BLD-MCNC: 8.9 MG/DL (ref 8.7–10.4)
CHLORIDE SERPL-SCNC: 106 MMOL/L (ref 98–112)
CO2 SERPL-SCNC: 28 MMOL/L (ref 21–32)
COLOR FLD: YELLOW
CREAT BLD-MCNC: 1.96 MG/DL
DEPRECATED RDW RBC AUTO: 64.4 FL (ref 35.1–46.3)
EGFRCR SERPLBLD CKD-EPI 2021: 26 ML/MIN/1.73M2 (ref 60–?)
EOSINOPHIL # BLD AUTO: 0.05 X10(3) UL (ref 0–0.7)
EOSINOPHIL NFR BLD AUTO: 2.8 %
EOSINOPHIL NFR PLR: 1 %
ERYTHROCYTE [DISTWIDTH] IN BLOOD BY AUTOMATED COUNT: 19.3 % (ref 11–15)
GLUCOSE BLD-MCNC: 78 MG/DL (ref 70–99)
HCT VFR BLD AUTO: 30.4 %
HGB BLD-MCNC: 9.6 G/DL
IMM GRANULOCYTES # BLD AUTO: 0.03 X10(3) UL (ref 0–1)
IMM GRANULOCYTES NFR BLD: 1.7 %
LDH FLD L TO P-CCNC: 117 U/L
LYMPHOCYTES # BLD AUTO: 0.83 X10(3) UL (ref 1–4)
LYMPHOCYTES NFR BLD AUTO: 46.4 %
LYMPHOCYTES NFR PLR: 21 %
MAGNESIUM SERPL-MCNC: 2.2 MG/DL (ref 1.6–2.6)
MCH RBC QN AUTO: 31.9 PG (ref 26–34)
MCHC RBC AUTO-ENTMCNC: 31.6 G/DL (ref 31–37)
MCV RBC AUTO: 101 FL
MESOTHL CELL NFR PLR: 1 %
MONOCYTES # BLD AUTO: 0.1 X10(3) UL (ref 0.1–1)
MONOCYTES NFR BLD AUTO: 5.6 %
MONOS+MACROS NFR PLR: 76 %
NEUTROPHILS # BLD AUTO: 0.76 X10 (3) UL (ref 1.5–7.7)
NEUTROPHILS # BLD AUTO: 0.76 X10(3) UL (ref 1.5–7.7)
NEUTROPHILS NFR BLD AUTO: 42.4 %
NEUTROPHILS NFR PLR: 1 %
OSMOLALITY SERPL CALC.SUM OF ELEC: 305 MOSM/KG (ref 275–295)
PLATELET # BLD AUTO: 209 10(3)UL (ref 150–450)
POTASSIUM SERPL-SCNC: 4.2 MMOL/L (ref 3.5–5.1)
PROT PLR-MCNC: 4.3 G/DL
RBC # BLD AUTO: 3.01 X10(6)UL
RBC # FLD: 321 /CUMM (ref ?–1)
SODIUM SERPL-SCNC: 142 MMOL/L (ref 136–145)
TOTAL CELLS COUNTED FLD: 100
TOTAL CELLS COUNTED PLR: 38 /CUMM (ref ?–1)
TURBIDITY CSF QL: CLEAR
WBC # BLD AUTO: 1.8 X10(3) UL (ref 4–11)
WBC # PLR: 38 /CUMM

## 2024-07-16 PROCEDURE — 99223 1ST HOSP IP/OBS HIGH 75: CPT | Performed by: INTERNAL MEDICINE

## 2024-07-16 PROCEDURE — 32555 ASPIRATE PLEURA W/ IMAGING: CPT | Performed by: INTERNAL MEDICINE

## 2024-07-16 PROCEDURE — 0W9B3ZZ DRAINAGE OF LEFT PLEURAL CAVITY, PERCUTANEOUS APPROACH: ICD-10-PCS | Performed by: INTERNAL MEDICINE

## 2024-07-16 PROCEDURE — 99233 SBSQ HOSP IP/OBS HIGH 50: CPT | Performed by: HOSPITALIST

## 2024-07-16 RX ORDER — MIDODRINE HYDROCHLORIDE 5 MG/1
5 TABLET ORAL
Status: DISCONTINUED | OUTPATIENT
Start: 2024-07-16 | End: 2024-07-22

## 2024-07-16 RX ORDER — CHLORHEXIDINE GLUCONATE 40 MG/ML
SOLUTION TOPICAL
Status: COMPLETED | OUTPATIENT
Start: 2024-07-17 | End: 2024-07-17

## 2024-07-16 RX ORDER — HYDROCODONE BITARTRATE AND ACETAMINOPHEN 10; 325 MG/1; MG/1
1 TABLET ORAL EVERY 6 HOURS PRN
Status: DISCONTINUED | OUTPATIENT
Start: 2024-07-16 | End: 2024-07-17

## 2024-07-16 RX ORDER — MIDODRINE HYDROCHLORIDE 2.5 MG/1
2.5 TABLET ORAL
Status: DISCONTINUED | OUTPATIENT
Start: 2024-07-16 | End: 2024-07-16

## 2024-07-16 RX ORDER — BUMETANIDE 0.25 MG/ML
2 INJECTION INTRAMUSCULAR; INTRAVENOUS ONCE
Status: COMPLETED | OUTPATIENT
Start: 2024-07-16 | End: 2024-07-16

## 2024-07-16 RX ORDER — MORPHINE SULFATE 2 MG/ML
2 INJECTION, SOLUTION INTRAMUSCULAR; INTRAVENOUS EVERY 2 HOUR PRN
Status: COMPLETED | OUTPATIENT
Start: 2024-07-16 | End: 2024-07-17

## 2024-07-16 RX ORDER — SODIUM CHLORIDE 9 MG/ML
INJECTION, SOLUTION INTRAVENOUS
Status: COMPLETED | OUTPATIENT
Start: 2024-07-17 | End: 2024-07-17

## 2024-07-16 RX ORDER — CYCLOBENZAPRINE HCL 5 MG
5 TABLET ORAL 3 TIMES DAILY PRN
Status: DISCONTINUED | OUTPATIENT
Start: 2024-07-16 | End: 2024-07-21

## 2024-07-16 RX ORDER — BUTALBITAL, ACETAMINOPHEN AND CAFFEINE 50; 325; 40 MG/1; MG/1; MG/1
1 TABLET ORAL ONCE
Status: COMPLETED | OUTPATIENT
Start: 2024-07-16 | End: 2024-07-16

## 2024-07-16 RX ORDER — BUMETANIDE 0.25 MG/ML
2 INJECTION INTRAMUSCULAR; INTRAVENOUS ONCE
Status: DISCONTINUED | OUTPATIENT
Start: 2024-07-16 | End: 2024-07-16

## 2024-07-16 NOTE — PROCEDURES
Thoracentesis Procedure Note    Pre-operative Diagnosis: large left sided pleural effusion     Post-operative Diagnosis: Same    Indications: Diagnostic and therapeutic removal of pleural fluid    Procedure Details   Consent: Informed consent was obtained. Risks of the procedure were discussed including: infection, bleeding, pain, and pneumothorax. Time out was done.    Sterile technique was used throughout using chlorhexidine solution and sterile drapes.  1% buffered lidocaine was used to anesthetize the left 7th rib space.    left side thoracentesis was performed with ultrasound guidance provided by radiology.  Fluid was obtained without any difficulties and minimal blood loss.  An adhesive bandage was applied to the wound.     Findings  1000 ml of clear yellow colored pleural fluid was obtained. A sample was sent to for cytology, cell count, chemistry, and bacterial/fungal/AFB culture.    Complications:  None; patient tolerated the procedure well.            Condition: stable    Plan  A follow up stat portable chest x-ray was ordered      .

## 2024-07-16 NOTE — PROGRESS NOTES
Progress Note  Margaret Silverio Patient Status:  Inpatient    3/14/1945 MRN [id number]   Location [facility] 3W/SW Attending Regan Cruz MD   Hosp Day # 1 PCP Johnathon Rodriguez DO     Subjective:  Reports abdominal fullness and discomfort, hematochezia last night when wiping, and neck pain that is chronic. Denies fevers/chills, chest pain, palpitations, dyspnea, or further changes.    Objective:  Physical Exam:   BP (!) 86/48 (BP Location: Left arm)   Pulse 60   Temp 98.4 °F (36.9 °C) (Oral)   Resp 18   Wt 123 lb 12.8 oz (56.2 kg)   SpO2 98%   BMI 21.25 kg/m²   Temp (24hrs), Av °F (36.7 °C), Min:97.6 °F (36.4 °C), Max:98.4 °F (36.9 °C)       Intake/Output Summary (Last 24 hours) at 2024 1045  Last data filed at 2024 0845  Gross per 24 hour   Intake 600 ml   Output 600 ml   Net 0 ml     Wt Readings from Last 3 Encounters:   24 123 lb 12.8 oz (56.2 kg)   24 124 lb 12.8 oz (56.6 kg)   24 136 lb (61.7 kg)     Telemetry: A-V paced  General: Chronically ill and thin appearing female who is alert and oriented in no apparent distress on 5 L by nasal cannula  HEENT: No focal deficits.  Neck: No JVD, carotids 2+ no bruits.  Cardiac: Regular rate and rhythm, S1, S2 normal, no murmur, rub or gallop.  Lungs: Diminished L side. Otherwise without wheezes, rales, rhonchi or dullness.  Normal excursions and effort on 5L by nasal cannula  Abdomen: Soft, distended, moderate diffuse tenderness without rebound tenderness or guarding  Extremities: Significant ulnar deviation of the wrists. Without clubbing, cyanosis or edema.  Peripheral pulses are 2+.  Neurologic: Alert and oriented, normal affect.  Skin: Warm and dry.        Intake/Output:    Intake/Output Summary (Last 24 hours) at 2024 1045  Last data filed at 2024 0845  Gross per 24 hour   Intake 600 ml   Output 600 ml   Net 0 ml       Last 3 Weights   24 0614 123 lb 12.8 oz (56.2 kg)   07/15/24 1830 124 lb  1.6 oz (56.3 kg)   07/09/24 1505 124 lb 12.8 oz (56.6 kg)   06/28/24 1501 136 lb (61.7 kg)       Labs:  Recent Labs   Lab 07/09/24  1452 07/15/24  1455 07/15/24  1634 07/16/24  0701   GLU 90 96  --  78   BUN 47*  --  51* 46*   CREATSERUM 2.14* 2.27*  --  1.96*   EGFRCR 23* 22*  --  26*   CA 9.3 9.2  --  8.9    142  --  142   K 4.2  --  5.3* 4.2    108  --  106   CO2 24.0 27.0  --  28.0     Recent Labs   Lab 07/15/24  1455 07/16/24  0701   RBC 2.97* 3.01*   HGB 9.6* 9.6*   HCT 30.0* 30.4*   .0* 101.0*   MCH 32.3 31.9   MCHC 32.0 31.6   RDW 20.0* 19.3*   NEPRELIM 0.86* 0.76*   WBC 1.5* 1.8*   .0 209.0         No results for input(s): \"TROP\", \"TROPHS\", \"CK\" in the last 168 hours.    Diagnostics:   ECHOCARDIOGRAM 6/20/2024:  1. Left ventricle: The cavity size was normal. Wall thickness was mildly      increased. Systolic function was markedly reduced. The estimated ejection      fraction was 30-35%, by biplane method of disks. Dyskinesis of the      anteroseptal and anterior walls. Unable to assess LV diastolic function      due to valvular repair/replacement.   2. Right ventricle: The cavity size was increased. Pacer wire noted in the      right ventricle.   3. Left atrium: The atrium was markedly dilated.   4. Right atrium: The atrium was dilated.   5. Atrial septum: There was no atrial level shunt.   6. Aortic valve: The findings were consistent with very mild stenosis. The      peak systolic velocity was 1.71m/sec. The mean systolic gradient was 6mm      Hg. The valve area (VTI) was 1.71cm^2.   7. Mitral valve: A bioprosthesis is present. There was no significant      regurgitation. The mean diastolic gradient was 6mm Hg.   8. Tricuspid valve: There was wide-open regurgitation.   Impressions:  This study is compared with previous dated 2/10/2024:           Medications:   heparin  5,000 Units Subcutaneous 2 times per day    amiodarone  200 mg Oral Daily    [Held by provider] carvedilol   3.125 mg Oral BID with meals    folic acid  800 mcg Oral Daily    gabapentin  300 mg Oral TID    isosorbide dinitrate  20 mg Oral TID (Nitrates)    pantoprazole  40 mg Oral BID AC      DOBUTamine 7.5 mcg/kg/min (07/15/24 2242)       Assessment/Plan:  Nonischemic Severe Biventricular failure presenting with weakness and dyspnea  L Left pleural effusion noted  Dobutamine and dose of IV Bumex yesterday   -300 with minimal output reported-weight minimally changed - renal function improved s/p diuresis x 1 with continued abdominal fullness consistent with R sided ventricular failure and ascites  Historically on Farxiga, aldactone, coreg, Entresto, and Bumex - currently all held d/t hypotension  Normal coronaries [facility] 2022  L sided pleural effusion  Would likely benefit from thoracentesis  Denies chronic oxygen use at home now on 5L  Hx of paroxysmal atrial flutter/fib  AV paced  Hx of L atrial appendage occlusion  Remains on daily amiodarone  Hx of surgical mitral valve replacement  Hx of severe GI bleeding this year  Reports scant hematochezia this AM hgb stable  Rheumatoid arthritis  On methotrexate  Chronic changes noted  Leukopenia  1.8 mild improvement since presentation at 1.5  On methotrexate for RA  Defer    PLAN  Bumex IV 2 mg today  Continue dobutamine 7.5 mcg  Consider thoracentesis for pleural effusion  Restart GDMT as tolerated by BP currently held  Continue amiodarone  Continue to monitor CHF order set and telemetry closely  Frequent RV pacing, LV dysfunction, and QRS >200 ms may benefit from BiV?    Anshlu Thomas PA-C  7/16/2024  10:45 AM     PM rounds addendum:    RV failure: Wide-open tricuspid regurgitation, the most likely reason for her symptoms despite her left-sided failure as well.  Would need tricuspid clip around the pacemaker lead which is tethering the valve as well  -Reduce dobutamine 5 mcg/kg/min  -Bumex drip 1 mg/h   -Right heart cath tomorrow after thoracentesis    Patient seen and examined  independently.  Agree with exam.  Labs reviewed.  Changes to assessment and plan as above.    Twin Boles MD  Interventional Cardiology  [facility]

## 2024-07-16 NOTE — CM/SW NOTE
07/16/24 1000   CM/SW Referral Data   Referral Source Social Work (self-referral)   Reason for Referral Discharge planning;Readmission   Informant Patient   Readmission Assessment   Factors that patient feels contributed to this readmission Acute/Chronic Clinical Presentation   Pt's living situation prior to admission? Home with family   Pt's level of independence at discharge? Some assist (mod)   Was full assessment completed by SW/CM on prior admission? Yes   Was the recommended discharge plan achieved? Yes   Was pt. discharged w/out services? No   Medical Hx   Does patient have an established PCP? Yes  (Johnathon Rodriguez)   Patient Info   Patient's Current Mental Status at Time of Assessment Alert;Oriented   Patient's Home Environment House   Number of Levels in Home 1   Number of Stair in Home 0   Patient lives with Spouse/Significant other   Patient Status Prior to Admission   Independent with ADLs and Mobility No   Pt. requires assistance with Ambulating;Driving;Housework   Services in place prior to admission Home Health Care;DME/Supplies at home   Home Health Provider Info [facility]   DME Provider/Supplier HME  (2L O2)   Type of DME/Supplies Standard Walker;Shower Chair;Home Oxygen   Discharge Needs   Anticipated D/C needs Home health care;To be determined       SW self referred pt for DC Planning as pt is a READMISSION.    SW met w/ pt at bedside. Above assessment completed.    Confirmed pt uses a walker at baseline. Pt is current w/ HME for 2L O2 at home since 7/10/24.    Pt confirmed being current w/ [facility].    Pt is unsure if she would want CÉSAR if recommended - agreed to SW/CM f/up as needed for further DC planning.    Clinical updates sent to [facility] via [employer]. F2F entered/sent.    PLAN: Home w/ [facility] - pending O2 needs increased & clinical course      SW/CM to remain available for support and/or discharge planning.         Juliana Rodrigez,  MSW, LSW [id number]

## 2024-07-16 NOTE — ED PROVIDER NOTES
Patient Seen in: [facility] Emergency Department    History     Chief Complaint   Patient presents with    Shortness Of Breath       HPI    The patient presents to the ED complaining of shortness of breath and some palpitations starting yesterday.  She states that shortness of breath is worsened progressed over the past few days.  Daughter states that she is noncompliant with her Bumex.  Patient has had many admissions for heart failure exacerbations over the past 6 months.    History reviewed.   Past Medical History:    Acute kidney insufficiency    Anemia    Arrhythmia    Back problem    CHF (congestive heart failure) (HCC)    CKD (chronic kidney disease) stage 3, GFR 30-59 ml/min (HCC)    Deep vein thrombosis (HCC)    on B.T for only a month, possibly Magen    Essential hypertension    High blood pressure    History of blood transfusion    Rheumatoid arthritis (HCC)    Seizure disorder (HCC)    Pt denied at screening call 6/28/24       History reviewed.   Past Surgical History:   Procedure Laterality Date    Cabg      Cardiac pacemaker placement      Colonoscopy N/A 01/17/2024    Dr. Rios    Colonoscopy N/A 01/17/2024    Procedure: COLONOSCOPY;  Surgeon: Zoraida Rios MD;  Location: [facility] ENDOSCOPY    Egd N/A 01/17/2024    Dr. Rios    Fracture surgery      Other surgical history  2017    Heart Surgery     Other surgical history  2020    Bilateral shoulder replacement          Medications :  Medications Prior to Admission   Medication Sig Dispense Refill Last Dose    acetaminophen (TYLENOL EXTRA STRENGTH) 500 MG Oral Tab Take 1 tablet (500 mg total) by mouth every 6 (six) hours as needed for Pain.   Past Week    isosorbide dinitrate 20 MG Oral Tab Take 1 tablet (20 mg total) by mouth TID (Nitrates). 90 tablet 3 7/15/2024    gabapentin 300 MG Oral Cap Take 1 capsule (300 mg total) by mouth 3 (three) times daily. 90 capsule 0 7/15/2024    dapagliflozin (FARXIGA) 10 MG Oral Tab Take 1 tablet (10 mg total) by mouth  daily. 30 tablet 5 7/15/2024    spironolactone 25 MG Oral Tab    7/15/2024    HYDROcodone-acetaminophen (NORCO)  MG Oral Tab Take 1 tablet by mouth every 8 (eight) hours as needed for Pain. 60 tablet 0 Past Week    bumetanide 2 MG Oral Tab Take 1 tablet (2 mg total) by mouth daily. 30 tablet 0 7/15/2024    alendronate 70 MG Oral Tab Take 1 tablet (70 mg total) by mouth once a week. 12 tablet 3 7/15/2024    methotrexate 2.5 MG Oral Tab TAKE 6 TABLETS BY MOUTH 1 TIME A WEEK 77 tablet 0 Past Week    lidocaine-menthol 4-1 % External Patch Place 1 patch onto the skin daily. 30 patch 0 7/15/2024    PANTOPRAZOLE 40 MG Oral Tab EC TAKE 1 TABLET(40 MG) BY MOUTH TWICE DAILY BEFORE MEALS 180 tablet 0 7/15/2024    CARVEDILOL 3.125 MG Oral Tab Take 1 tablet (3.125 mg total) by mouth 2 (two) times daily with meals. 60 tablet 1 7/15/2024    SACUBITRIL-VALSARTAN 49-51 MG Oral Tab Take 1 tablet by mouth 2 (two) times daily. 60 tablet 1 7/15/2024    folic acid 800 MCG Oral Tab Take 1 tablet (800 mcg total) by mouth daily. 90 tablet 0 7/15/2024    amiodarone 200 MG Oral Tab Take 1 tablet (200 mg total) by mouth daily.   7/15/2024    ferrous sulfate 325 (65 FE) MG Oral Tab EC Take 1 tablet (325 mg total) by mouth daily with breakfast.   7/15/2024        No family history on file.    Smoking Status:   Social History     Socioeconomic History    Marital status:    Tobacco Use    Smoking status: Former     Current packs/day: 0.00     Types: Cigarettes     Quit date: 2014     Years since quitting: 10.5    Smokeless tobacco: Never   Vaping Use    Vaping status: Never Used   Substance and Sexual Activity    Alcohol use: Never    Drug use: Never       Constitutional and vital signs reviewed.      Social History and Family History elements reviewed from today, pertinent positives to the presenting problem noted.    Physical Exam     ED Triage Vitals [07/15/24 1258]   /68   Pulse 63   Resp 18   Temp 97.6 °F (36.4 °C)   Temp  src Oral   SpO2 92 %   O2 Device Nasal cannula       All measures to prevent infection transmission during my interaction with the patient were taken. Handwashing was performed prior to and after the exam.  Stethoscope and any equipment used during my examination was cleaned with super sani-cloth germicidal wipes following the exam.     Physical Exam  Vitals and nursing note reviewed.   Constitutional:       General: She is not in acute distress.     Appearance: She is well-developed. She is not ill-appearing or toxic-appearing.   HENT:      Head: Normocephalic and atraumatic.   Eyes:      General:         Right eye: No discharge.         Left eye: No discharge.      Conjunctiva/sclera: Conjunctivae normal.   Neck:      Trachea: No tracheal deviation.   Cardiovascular:      Rate and Rhythm: Normal rate and regular rhythm.   Pulmonary:      Effort: Pulmonary effort is normal. No respiratory distress.      Breath sounds: No stridor. Rales present.   Abdominal:      General: There is no distension.      Palpations: Abdomen is soft.   Musculoskeletal:         General: No deformity.   Skin:     General: Skin is warm and dry.   Neurological:      Mental Status: She is alert and oriented to person, place, and time.   Psychiatric:         Mood and Affect: Mood normal.         Behavior: Behavior normal.         ED Course        Labs Reviewed   BASIC METABOLIC PANEL (8) - Abnormal; Notable for the following components:       Result Value    Creatinine 2.27 (*)     eGFR-Cr 22 (*)     All other components within normal limits   BNP (B TYPE NATRIURETIC PEPTIDE) - Abnormal; Notable for the following components:    Beta Natriuretic Peptide 1,944 (*)     All other components within normal limits   RBC MORPHOLOGY SCAN - Abnormal; Notable for the following components:    RBC Morphology See morphology below (*)     All other components within normal limits   REDRAW BMP - Abnormal; Notable for the following components:    Potassium 5.3  (*)     BUN 51 (*)     All other components within normal limits    Narrative:     Analysis performed on hemolyzed specimen at the request of the ordering physician.  The accuracy of these results should be considered suspect and inadequate for clinical assessment.  A request for a redraw of the patient was deferred by the ordering physician.    CBC W/ DIFFERENTIAL - Abnormal; Notable for the following components:    WBC 1.5 (*)     RBC 2.97 (*)     HGB 9.6 (*)     HCT 30.0 (*)     .0 (*)     RDW-SD 67.5 (*)     RDW 20.0 (*)     Neutrophil Absolute Prelim 0.86 (*)     Neutrophil Absolute 0.86 (*)     Lymphocyte Absolute 0.45 (*)     Monocyte Absolute 0.09 (*)     All other components within normal limits   CBC WITH DIFFERENTIAL WITH PLATELET    Narrative:     The following orders were created for panel order CBC With Differential With Platelet.                  Procedure                               Abnormality         Status                                     ---------                               -----------         ------                                     CBC W/ DIFFERENTIAL[[id number]]          Abnormal            Final result                                                 Please view results for these tests on the individual orders.   SCAN SLIDE     EKG    Rate, intervals and axes as noted on EKG Report.  Rate: Normal, 60 bpm  Rhythm: Paced rhythm  Reading: Paced rhythm, abnormal EKG           As Interpreted by me    Imaging Results Available and Reviewed while in ED: XR CHEST AP PORTABLE  (CPT=71045)    Result Date: 7/15/2024  CONCLUSION:   Enlarging moderate to large left pleural effusion with associated increase in the opacities in the left perihilar region and left lower lobe, which are favored to reflect atelectasis with or without superimposed pneumonia.  Progressive pulmonary edema.  No significant change in the right pleural effusion with associated right perihilar and right lower lobe  opacities.  Lesser incidental findings described above.    Dictated by (CST): Anshul Joseph MD on 7/15/2024 at 3:39 PM     Finalized by (CST): Anshul Joseph MD on 7/15/2024 at 3:42 PM         ED Medications Administered:   Medications   heparin (Porcine) 5000 UNIT/ML injection 5,000 Units (5,000 Units Subcutaneous Given 7/15/24 2000)   acetaminophen (Tylenol Extra Strength) tab 500 mg (has no administration in time range)   ondansetron (Zofran) 4 MG/2ML injection 4 mg (has no administration in time range)   metoclopramide (Reglan) 5 mg/mL injection 5 mg (has no administration in time range)   DOBUTamine in dextrose 5% (Dobutrex) 500 mg/250mL infusion premix (5 mcg/kg/min × 56.6 kg Intravenous New Bag 7/15/24 2001)   amiodarone (Pacerone) tab 200 mg (has no administration in time range)   carvedilol (Coreg) tab 3.125 mg ( Oral Automatically Held 7/18/24 1800)   folic acid (Folvite) tab 800 mcg (has no administration in time range)   gabapentin (Neurontin) cap 300 mg (300 mg Oral Given 7/15/24 2000)   HYDROcodone-acetaminophen (Norco)  MG per tab 1 tablet (1 tablet Oral Given 7/15/24 1955)   isosorbide dinitrate (Isordil) tab 20 mg (20 mg Oral Given 7/15/24 1955)   pantoprazole (Protonix) DR tab 40 mg (40 mg Oral Given 7/15/24 1955)   cyclobenzaprine (Flexeril) tab 5 mg (has no administration in time range)   bumetanide (Bumex) 0.25 MG/ML injection 2 mg (2 mg Intravenous Given 7/15/24 1457)         MDM     Vitals:    07/15/24 1818 07/15/24 1830 07/15/24 1900 07/15/24 2011   BP: 137/72   134/60   BP Location: Right arm   Right arm   Pulse: 60  63 60   Resp: 22   22   Temp: 98.1 °F (36.7 °C)   98.4 °F (36.9 °C)   TempSrc: Oral   Oral   SpO2: 92%   99%   Weight:  56.3 kg       *I personally reviewed and interpreted all ED vitals.    Pulse Ox: 99%, Room air, Normal     Monitor Interpretation:    Paced rhythm, normal rate  as interpreted by me.  The cardiac monitor was ordered to monitor heart  rate.    Differential Diagnosis/ Diagnostic Considerations: CHF exacerbation, pulmonary edema, pleural effusion, GEROGE, other    Complicating Factors: The patient already has does not have any pertinent problems on file. to contribute to the complexity of this ED evaluation.    Medical Decision Making  The patient presents to the ED with worsening shortness of breath and suspicion for worsening CHF exacerbation symptoms likely due to diuretic noncompliance.  Workup in the ED consistent with worsening pleural effusions and pulmonary edema.  Elevated BNP.  No evidence for ACS and patient otherwise nondistressed on exam.  Will admit for diuresis.  I discussed with Dr. Sahni for admission and [facility] cardiology for consultation.    Problems Addressed:  Acute on chronic congestive heart failure, unspecified heart failure type (HCC): chronic illness or injury with exacerbation, progression, or side effects of treatment that poses a threat to life or bodily functions    Amount and/or Complexity of Data Reviewed  Labs: ordered. Decision-making details documented in ED Course.  Radiology: ordered and independent interpretation performed. Decision-making details documented in ED Course.     Details: I personally reviewed the patient's chest x-ray image and noted no pneumothorax  ECG/medicine tests: ordered and independent interpretation performed. Decision-making details documented in ED Course.  Discussion of management or test interpretation with external provider(s): I discussed with Dr. Sahni for admission        Condition upon leaving the department: Stable    Disposition and Plan     Clinical Impression:  1. Acute on chronic congestive heart failure, unspecified heart failure type (HCC)        Disposition:  Admit    Follow-up:  No follow-up provider specified.    Medications Prescribed:  Current Discharge Medication List          Hospital Problems       Present on Admission  Date Reviewed: 7/9/2024            ICD-10-CM  Noted POA    * (Principal) Acute on chronic congestive heart failure, unspecified heart failure type (HCC) I50.9 7/15/2024 Unknown    Acute on chronic systolic (congestive) heart failure (HCC) I50.23 6/18/2024 Unknown

## 2024-07-16 NOTE — PROGRESS NOTES
[facility]  part of [facility]    Progress Note    Margaret Silverio Patient Status:  Inpatient    3/14/1945 MRN [id number]   Location [facility] 3W/SW Attending Regan Cruz MD   Hosp Day # 1 PCP Johnathon Rodriguez DO     Chief Complaint:   Chief Complaint   Patient presents with    Shortness Of Breath   SOB x 1 week. Was started on o2  with no improvement.     Subjective:   Margaret Silverio is still SOB no CP no cough. Pt c/o head pain. She had a GIUSEPPE for her neck pain recently taht helped but headache is severe sharp - she is tearful.       Objective:   Objective:    Blood pressure 112/56, pulse 60, temperature 98.4 °F (36.9 °C), temperature source Oral, resp. rate 18, weight 123 lb 12.8 oz (56.2 kg), SpO2 98%.    Physical Exam:    General: No acute distress.   Respiratory: Diminished L side. Crackles R side.   Cardiovascular: S1, S2. Regular rate and rhythm. No murmurs, rubs or gallops.   Abdomen: Soft, nontender, nondistended.  Positive bowel sounds. No rebound or guarding.  Neurologic: No focal neurological deficits.   Musculoskeletal: Moves all extremities.  Extremities: No edema.      Results:   Results:    Labs:  Recent Labs   Lab 07/15/24  1455 24  0701   WBC 1.5* 1.8*   HGB 9.6* 9.6*   .0* 101.0*   .0 209.0       Recent Labs   Lab 24  1452 07/15/24  1455 07/15/24  1634 24  0701   GLU 90 96  --  78   BUN 47*  --  51* 46*   CREATSERUM 2.14* 2.27*  --  1.96*   CA 9.3 9.2  --  8.9    142  --  142   K 4.2  --  5.3* 4.2    108  --  106   CO2 24.0 27.0  --  28.0       Estimated Creatinine Clearance: 20.4 mL/min (A) (based on SCr of 1.96 mg/dL (H)).    No results for input(s): \"PTP\", \"INR\" in the last 168 hours.         Culture:  No results found for this visit on 07/15/24.    Cardiac  No results for input(s): \"TROP\", \"PBNP\" in the last 168 hours.      Imaging: Imaging data reviewed in [facility].  XR CHEST AP PORTABLE   (CPT=71045)    Result Date: 7/15/2024  CONCLUSION:   Enlarging moderate to large left pleural effusion with associated increase in the opacities in the left perihilar region and left lower lobe, which are favored to reflect atelectasis with or without superimposed pneumonia.  Progressive pulmonary edema.  No significant change in the right pleural effusion with associated right perihilar and right lower lobe opacities.  Lesser incidental findings described above.    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   [facility] 2022 - normal coronaries.   Cards on consult  I/O, daily weights.   ECHO LVEF 30-35% dyskinesis of anterior walls. RV size increased  Large L pleural effusion   Hypoxia   Thoracentesis today   Pulm on consult.   Was recently placed on o2 at home on 7/9 2L continuous prior to this not on home o2  H/o paroxysmal A.fib   AV paced.   H/o BRANDYN occlusion   Not on A/C due to h/o GI bleed.   Amiodarone   Leukopenia   WBC 1.5   Hold methotrexate  Rpt labs in AM.   GEORGE on CKD IV   Baseline creat 1.4 -1.6 per EMR.   BUN/creat trending down with diuresis.   Rpt labs in AM.   Hypotension   Midodrine 2.5mg BID  Hold isordil for sBP < 100  Hold GDMT due to hypotension   Acute on chronic neck pain with radiculopathy  Multilevel spondylosis  C3-5  Was seen by neurosurgery not a surgical candidate.   Pain control.   Palliative care consult   Cont gabapentin increase to 400mg TID, cont norco PRN  Recently has GIUSEPPE outpt   Other medical problems  Severe RA with multiple joint deformities       >55min spent, >50% spent counseling and coordinating care in the form of educating pt/family and d/w consultants and staff. Most of the time spent discussing the above plan.        Plan of care discussed with patient or family at bedside.    Regan Cruz MD  Hospitalist          Supplementary Documentation:     Quality:  DVT Prophylaxis: SCD  CODE status: DNR   Dispo: per clinical course           Estimated date of discharge: TBD  Discharge is dependent on: clinical stability  At this point Ms. Silverio is expected to be discharge to: home         **Certification      PHYSICIAN Certification of Need for Inpatient Hospitalization - Initial Certification    Patient will require inpatient services that will reasonably be expected to span two midnight's based on the clinical documentation in H+P.   Based on patients current state of illness, I anticipate that, after discharge, patient will require TBD.

## 2024-07-16 NOTE — PLAN OF CARE
Patient is alert and oriented. Family was at the bedside throughout the shift. Patient is on 5L O2 via NC. PRN norco and hot packs were given for pain. Patient had a thoracentesis done today. Patient is on a dobutamine drip. Patient was started on PO midodrine. Plan is for possible right heart cath. Pulmonology and cardiology are on consult. VTE prophylaxis and safety measures are in place.     Problem: Patient Centered Care  Goal: Patient preferences are identified and integrated in the patient's plan of care  Description: Interventions:  - What would you like us to know as we care for you? I'm from home with my family  - Provide timely, complete, and accurate information to patient/family  - Incorporate patient and family knowledge, values, beliefs, and cultural backgrounds into the planning and delivery of care  - Encourage patient/family to participate in care and decision-making at the level they choose  - Honor patient and family perspectives and choices  Outcome: Progressing     Problem: Patient/Family Goals  Goal: Patient/Family Long Term Goal  Description: Patient's Long Term Goal: discharge home    Interventions:  - monitor vitals, labs, imaging  - cards, pulm on consult  - See additional Care Plan goals for specific interventions  Outcome: Progressing  Goal: Patient/Family Short Term Goal  Description: Patient's Short Term Goal: manage pain    Interventions:   - Frequent pain assessments  - PRN pain meds  - non-pharmacological interventions  - See additional Care Plan goals for specific interventions  Outcome: Progressing     Problem: CARDIOVASCULAR - ADULT  Goal: Maintains optimal cardiac output and hemodynamic stability  Description: INTERVENTIONS:  - Monitor vital signs, rhythm, and trends  - Monitor for bleeding, hypotension and signs of decreased cardiac output  - Evaluate effectiveness of vasoactive medications to optimize hemodynamic stability  - Monitor arterial and/or venous puncture sites for  bleeding and/or hematoma  - Assess quality of pulses, skin color and temperature  - Assess for signs of decreased coronary artery perfusion - ex. Angina  - Evaluate fluid balance, assess for edema, trend weights  Outcome: Progressing  Goal: Absence of cardiac arrhythmias or at baseline  Description: INTERVENTIONS:  - Continuous cardiac monitoring, monitor vital signs, obtain 12 lead EKG if indicated  - Evaluate effectiveness of antiarrhythmic and heart rate control medications as ordered  - Initiate emergency measures for life threatening arrhythmias  - Monitor electrolytes and administer replacement therapy as ordered  Outcome: Progressing     Problem: RESPIRATORY - ADULT  Goal: Achieves optimal ventilation and oxygenation  Description: INTERVENTIONS:  - Assess for changes in respiratory status  - Assess for changes in mentation and behavior  - Position to facilitate oxygenation and minimize respiratory effort  - Oxygen supplementation based on oxygen saturation or ABGs  - Provide Smoking Cessation handout, if applicable  - Encourage broncho-pulmonary hygiene including cough, deep breathe, Incentive Spirometry  - Assess the need for suctioning and perform as needed  - Assess and instruct to report SOB or any respiratory difficulty  - Respiratory Therapy support as indicated  - Manage/alleviate anxiety  - Monitor for signs/symptoms of CO2 retention  Outcome: Progressing     Problem: PAIN - ADULT  Goal: Verbalizes/displays adequate comfort level or patient's stated pain goal  Description: INTERVENTIONS:  - Encourage pt to monitor pain and request assistance  - Assess pain using appropriate pain scale  - Administer analgesics based on type and severity of pain and evaluate response  - Implement non-pharmacological measures as appropriate and evaluate response  - Consider cultural and social influences on pain and pain management  - Manage/alleviate anxiety  - Utilize distraction and/or relaxation techniques  - Monitor  for opioid side effects  - Notify MD/LIP if interventions unsuccessful or patient reports new pain  - Anticipate increased pain with activity and pre-medicate as appropriate  Outcome: Progressing

## 2024-07-16 NOTE — CONSULTS
[facility]  part of [facility]    Report of Consultation    Margaret Silverio Patient Status:  Inpatient    3/14/1945 MRN [id number]   Location [facility] 3W/SW Attending Regan Cruz MD   Hosp Day # 1 PCP Johnathon Rodriguez DO     Date of Admission:  7/15/2024  Date of Consult: 2024    Reason for Consultation:   Consults  Respiratory failure with hypoxia  Large pleural effusion  Severe cardiomyopathy    History provided by:patient and daughter  HPI:     Chief Complaint   Patient presents with    Shortness Of Breath     HPI    This is a very pleasant 79-year-old female with history of severe nonischemic cardiomyopathy HFrEF, CKD , pacemaker , severe RA with joint deformities .    Patient admitted on 7/15/2024 with dyspnea and chest x-ray showed moderate right pleural effusion and large left sided pleural effusion with high BNP  No active cough or sputum and denies fever or chills  Patient was on 2 L of oxygen increased to 5 L  Comfortable at rest and started on dobutamine drip  Worsening renal function  Severe neutropenia for unknown etiology  No reported high fever or chills  Headache and neck pain which is chronic with severe arthritis and chronic neck pain and headache  Generalized fatigue and weakness  No abdominal pain or vomiting or diarrhea  Muscle wasting and fatigue          History     Past Medical History:    Acute kidney insufficiency    Anemia    Arrhythmia    Back problem    CHF (congestive heart failure) (HCC)    CKD (chronic kidney disease) stage 3, GFR 30-59 ml/min (HCC)    Deep vein thrombosis (HCC)    on B.T for only a month, possibly Magen    Essential hypertension    High blood pressure    History of blood transfusion    Rheumatoid arthritis (HCC)    Seizure disorder (HCC)    Pt denied at screening call 24     Past Surgical History:   Procedure Laterality Date    Cabg      Cardiac pacemaker placement      Colonoscopy N/A 2024    Dr. Rios     Colonoscopy N/A 01/17/2024    Procedure: COLONOSCOPY;  Surgeon: Zoraida Rios MD;  Location: [facility] ENDOSCOPY    Egd N/A 01/17/2024    Dr. Rios    Fracture surgery      Other surgical history  2017    Heart Surgery     Other surgical history  2020    Bilateral shoulder replacement      No family history on file.  Social History:  Social History     Socioeconomic History    Marital status:    Tobacco Use    Smoking status: Former     Current packs/day: 0.00     Types: Cigarettes     Quit date: 2014     Years since quitting: 10.5    Smokeless tobacco: Never   Vaping Use    Vaping status: Never Used   Substance and Sexual Activity    Alcohol use: Never    Drug use: Never     Social Determinants of Health     Financial Resource Strain: Low Risk  (6/4/2024)    Financial Resource Strain     Difficulty of Paying Living Expenses: Not very hard     Med Affordability: No   Food Insecurity: No Food Insecurity (7/15/2024)    Food Insecurity     Food Insecurity: Never true   Transportation Needs: No Transportation Needs (7/15/2024)    Transportation Needs     Lack of Transportation: No   Housing Stability: Low Risk  (7/15/2024)    Housing Stability     Housing Instability: No     Allergies/Medications:   Allergies:   Allergies   Allergen Reactions    Lisinopril Coughing     Medications Prior to Admission   Medication Sig    acetaminophen (TYLENOL EXTRA STRENGTH) 500 MG Oral Tab Take 1 tablet (500 mg total) by mouth every 6 (six) hours as needed for Pain.    isosorbide dinitrate 20 MG Oral Tab Take 1 tablet (20 mg total) by mouth TID (Nitrates).    gabapentin 300 MG Oral Cap Take 1 capsule (300 mg total) by mouth 3 (three) times daily.    dapagliflozin (FARXIGA) 10 MG Oral Tab Take 1 tablet (10 mg total) by mouth daily.    spironolactone 25 MG Oral Tab     HYDROcodone-acetaminophen (NORCO)  MG Oral Tab Take 1 tablet by mouth every 8 (eight) hours as needed for Pain.    bumetanide 2 MG Oral Tab Take 1 tablet (2 mg total)  by mouth daily.    alendronate 70 MG Oral Tab Take 1 tablet (70 mg total) by mouth once a week.    methotrexate 2.5 MG Oral Tab TAKE 6 TABLETS BY MOUTH 1 TIME A WEEK    lidocaine-menthol 4-1 % External Patch Place 1 patch onto the skin daily.    PANTOPRAZOLE 40 MG Oral Tab EC TAKE 1 TABLET(40 MG) BY MOUTH TWICE DAILY BEFORE MEALS    CARVEDILOL 3.125 MG Oral Tab Take 1 tablet (3.125 mg total) by mouth 2 (two) times daily with meals.    SACUBITRIL-VALSARTAN 49-51 MG Oral Tab Take 1 tablet by mouth 2 (two) times daily.    folic acid 800 MCG Oral Tab Take 1 tablet (800 mcg total) by mouth daily.    amiodarone 200 MG Oral Tab Take 1 tablet (200 mg total) by mouth daily.    ferrous sulfate 325 (65 FE) MG Oral Tab EC Take 1 tablet (325 mg total) by mouth daily with breakfast.       Review of Systems:     Constitutional:  Positive for fatigue.   HENT:  Negative for congestion.    Respiratory:  Positive for shortness of breath. Negative for cough, wheezing and stridor.    Cardiovascular:  Negative for chest pain and leg swelling.   Gastrointestinal:  Negative for abdominal distention.   Musculoskeletal:  Positive for back pain and neck pain.   Skin: Negative.    Neurological:  Positive for headaches. Negative for dizziness and light-headedness.   Psychiatric/Behavioral:  Negative for agitation.        Physical Exam:   Vital Signs:   weight is 123 lb 12.8 oz (56.2 kg). Her oral temperature is 98.4 °F (36.9 °C). Her blood pressure is 112/56 and her pulse is 60. Her respiration is 18 and oxygen saturation is 98%.   Physical Exam  Constitutional:       General: She is in acute distress.      Appearance: She is ill-appearing.   HENT:      Head: Atraumatic.      Nose: Nose normal.      Mouth/Throat:      Mouth: Mucous membranes are moist.   Eyes:      General: No scleral icterus.  Cardiovascular:      Rate and Rhythm: Normal rate.      Heart sounds: Murmur heard.      No gallop.   Pulmonary:      Comments: Symmetrical expansion of  chest wall  Diminished breath sounds in the lower two thirds of the left lung with increased tympanic and percussion  Diminished breath sounds in the right base  No wheezes or rhonchi  Abdominal:      General: Abdomen is flat. Bowel sounds are normal.      Tenderness: There is no guarding.   Musculoskeletal:      Cervical back: Neck supple. No rigidity.      Right lower leg: No edema.      Left lower leg: No edema.      Comments: Severe joint deformities   Skin:     General: Skin is dry.   Neurological:      Mental Status: She is oriented to person, place, and time.         Results:     Lab Results   Component Value Date    WBC 1.8 (L) 07/16/2024    HGB 9.6 (L) 07/16/2024    HCT 30.4 (L) 07/16/2024    .0 07/16/2024    CREATSERUM 1.96 (H) 07/16/2024    BUN 46 (H) 07/16/2024     07/16/2024    K 4.2 07/16/2024     07/16/2024    CO2 28.0 07/16/2024    GLU 78 07/16/2024    CA 8.9 07/16/2024    ALB 4.0 07/01/2024    ALKPHO 328 (H) 06/18/2024    BILT 0.8 06/18/2024    TP 7.7 06/18/2024    AST 23 06/18/2024    ALT 11 06/18/2024    PTT 35.2 02/09/2024    INR 1.25 (H) 02/09/2024    T4F 1.4 06/18/2024    TSH 7.534 (H) 06/18/2024    LIP 32 01/13/2024    ESRML 61 (H) 04/23/2024    CRP 5.10 (H) 04/23/2024    MG 2.2 07/16/2024    PHOS 3.8 07/01/2024    TROPHS 24 05/24/2024    B12 >2,000 (H) 07/01/2024     XR CHEST AP PORTABLE  (CPT=71045)    Result Date: 7/15/2024  CONCLUSION:   Enlarging moderate to large left pleural effusion with associated increase in the opacities in the left perihilar region and left lower lobe, which are favored to reflect atelectasis with or without superimposed pneumonia.  Progressive pulmonary edema.  No significant change in the right pleural effusion with associated right perihilar and right lower lobe opacities.  Lesser incidental findings described above.    Dictated by (CST): Anshul Joseph MD on 7/15/2024 at 3:39 PM     Finalized by (CST): Anshul Joseph MD on 7/15/2024 at 3:42  PM         EKG    Result Date: 7/15/2024  AV dual-paced rhythm Abnormal ECG When compared with ECG of 18-JUN-2024 12:36, No significant change was found Confirmed by RAAD LY PRATIK ([id number]) on 7/15/2024 3:53:40 PM     Impression:       1-acute respiratory failure with hypoxia  Severe cardiomyopathy HFrEF with  large pleural effusion L>>R   CXR large left pleural effusion and small right effusion     On 5 L NC   CHF management per cardiology  Will proceed with left-sided thoracentesis d/w pt and daughter and agreeable     2-severe nonischemic cardiomyopathy HFrEF with right heart failure  History of pacemaker  Cardiology following    3-cardiorenal syndrome with chronic kidney disease    4-severe neutropenia  ?  Etiology possible related to methotrexate    5-severe rheumatoid arthritis with joints deformities  On methotrexate and now on hold for severe neutropenia    6-DVT prophylaxis  Heparin subcu    7-DNAR/select      D/w pt and daughter   D/w Dr Anthony Liao MD  7/16/2024

## 2024-07-16 NOTE — PLAN OF CARE
Dobutamine drip infusing-see MAR-rate increased as pt became hypotensive. MD made aware - see orders. Pt noted to have RAM with labored breathing and saturating at 88%, NC increased to 5L- saturation increased to 98%-breathing regular and unlabored. PRN and x1 dose given for pain 10/10 - MD made aware-see MAR    Problem: Patient Centered Care  Goal: Patient preferences are identified and integrated in the patient's plan of care  Description: Interventions:  - Provide timely, complete, and accurate information to patient/family  - Incorporate patient and family knowledge, values, beliefs, and cultural backgrounds into the planning and delivery of care  - Encourage patient/family to participate in care and decision-making at the level they choose  - Honor patient and family perspectives and choices  7/16/2024 0202 by Yessenia Denson RN  Outcome: Progressing  7/16/2024 0202 by Yessenia Denson RN  Outcome: Progressing        Problem: CARDIOVASCULAR - ADULT  Goal: Maintains optimal cardiac output and hemodynamic stability  Description: INTERVENTIONS:  - Monitor vital signs, rhythm, and trends  - Monitor for bleeding, hypotension and signs of decreased cardiac output  - Evaluate effectiveness of vasoactive medications to optimize hemodynamic stability  - Monitor arterial and/or venous puncture sites for bleeding and/or hematoma  - Assess quality of pulses, skin color and temperature  - Assess for signs of decreased coronary artery perfusion - ex. Angina  - Evaluate fluid balance, assess for edema, trend weights  7/16/2024 0202 by Yessenia Denson RN  Outcome: Progressing  7/16/2024 0202 by Yessenia Denson RN  Outcome: Progressing  Goal: Absence of cardiac arrhythmias or at baseline  Description: INTERVENTIONS:  - Continuous cardiac monitoring, monitor vital signs, obtain 12 lead EKG if indicated  - Evaluate effectiveness of antiarrhythmic and heart rate control medications as ordered  - Initiate emergency  measures for life threatening arrhythmias  - Monitor electrolytes and administer replacement therapy as ordered  7/16/2024 0202 by Yessenia Denson RN  Outcome: Progressing  7/16/2024 0202 by Yessenia Denson RN  Outcome: Progressing       Problem: RESPIRATORY - ADULT  Goal: Achieves optimal ventilation and oxygenation  Description: INTERVENTIONS:  - Assess for changes in respiratory status  - Assess for changes in mentation and behavior  - Position to facilitate oxygenation and minimize respiratory effort  - Oxygen supplementation based on oxygen saturation or ABGs  - Provide Smoking Cessation handout, if applicable  - Encourage broncho-pulmonary hygiene including cough, deep breathe, Incentive Spirometry  - Assess the need for suctioning and perform as needed  - Assess and instruct to report SOB or any respiratory difficulty  - Respiratory Therapy support as indicated  - Manage/alleviate anxiety  - Monitor for signs/symptoms of CO2 retention  7/16/2024 0202 by Yessenia Denson RN  Outcome: Progressing  7/16/2024 0202 by Yessenia Denson RN  Outcome: Progressing       Problem: PAIN - ADULT  Goal: Verbalizes/displays adequate comfort level or patient's stated pain goal  Description: INTERVENTIONS:  - Encourage pt to monitor pain and request assistance  - Assess pain using appropriate pain scale  - Administer analgesics based on type and severity of pain and evaluate response  - Implement non-pharmacological measures as appropriate and evaluate response  - Consider cultural and social influences on pain and pain management  - Manage/alleviate anxiety  - Utilize distraction and/or relaxation techniques  - Monitor for opioid side effects  - Notify MD/LIP if interventions unsuccessful or patient reports new pain  - Anticipate increased pain with activity and pre-medicate as appropriate  Outcome: Progressing

## 2024-07-16 NOTE — DIETARY NOTE
Dietitian Note     Received consult for \"heart failure diet education.\" Pt given HF diet education and handout 1 month ago on 6/19/24. No further diet education warranted. Will clear consult and remain available for other nutrition interventions as needed.     Sydney Edwards RD, LDN  Clinical Dietitian  P: [phone]

## 2024-07-16 NOTE — DISCHARGE INSTRUCTIONS
Going Home Instructions  In this section you will find the tools which will guide you through the first few days after you leave the hospital. Continued use of these tools will help you develop the skills necessary to keep your heart failure under control.     Home Care Instructions Following Heart Failure - the most important things to do every day include:   Weigh yourself and review the “Self-Check Plan” sheet every morning.   Call your cardiologist office if you are in the “Pay Attention-Use Caution” (yellow zone) or “Medical Alert-Warning!” (red zone) as outlined in the Self-Check Plan sheet.  Take your medicines as prescribed.  Limit your sodium (salt) intake.  Know when to call your cardiologist, primary doctor, or nurse.  Know when to seek emergency care.      Things for You to Remember:   1. See your doctor or healthcare provider as written on your discharge instructions.  It is important that you attend this appointment to make sure your symptoms are under control.     2. Your recommended sodium intake is 0213-5202 mg daily.    3.  Weigh yourself every day.    4. Some exercise and activity is important to help keep your heart functioning and strong. Unless instructed not to exercise, you may walk at a slow to moderate pace for 10-15 minutes 2-3 days per week to start. Pace your activity to prevent shortness of breath or fatigue. Stop exercising if you develop chest pain, lightheadedness, or significant shortness of breath.       Call Your Cardiologist If:   You gain 2-3 pounds in one day or 5 pounds in one week.  You have more difficulty breathing.  You are getting more tired with normal activity.  You are more short of breath lying down, or awaken at night short of breath.  You have swelling of your feet or legs.  You urinate less often during the day and more often at night.  You have cramps in your legs.  You have blurred vision or see yellowish-green halos around objects of lights.    Go to the  Emergency Room If:   You have pain or tightness in your chest  You are extremely short of breath  You are coughing up pink-frothy mucus  You are traveling and develop symptoms of worsening heart failure      ** Please follow up with your cardiologist or Advanced Practice Provider as written on your discharge instructions. If you are not provided with an appointment, let your nurse know so you can get an appointment**      [facility] to follow at discharge. Please call [facility] with any questions, they can be reached by phone at [phone].

## 2024-07-17 ENCOUNTER — APPOINTMENT (OUTPATIENT)
Dept: INTERVENTIONAL RADIOLOGY/VASCULAR | Facility: HOSPITAL | Age: 79
End: 2024-07-17
Attending: PHYSICIAN ASSISTANT
Payer: MEDICARE

## 2024-07-17 ENCOUNTER — APPOINTMENT (OUTPATIENT)
Dept: PICC SERVICES | Facility: HOSPITAL | Age: 79
End: 2024-07-17
Attending: NURSE PRACTITIONER
Payer: MEDICARE

## 2024-07-17 LAB
ANION GAP SERPL CALC-SCNC: 7 MMOL/L (ref 0–18)
BUN BLD-MCNC: 44 MG/DL (ref 9–23)
BUN/CREAT SERPL: 23.3 (ref 10–20)
CALCIUM BLD-MCNC: 8.8 MG/DL (ref 8.7–10.4)
CHLORIDE SERPL-SCNC: 105 MMOL/L (ref 98–112)
CO2 SERPL-SCNC: 29 MMOL/L (ref 21–32)
CREAT BLD-MCNC: 1.89 MG/DL
CRP SERPL-MCNC: 9 MG/DL (ref ?–1)
DEPRECATED RDW RBC AUTO: 63.2 FL (ref 35.1–46.3)
EGFRCR SERPLBLD CKD-EPI 2021: 27 ML/MIN/1.73M2 (ref 60–?)
ERYTHROCYTE [DISTWIDTH] IN BLOOD BY AUTOMATED COUNT: 19.2 % (ref 11–15)
ERYTHROCYTE [SEDIMENTATION RATE] IN BLOOD: 40 MM/HR
GLUCOSE BLD-MCNC: 79 MG/DL (ref 70–99)
HCT VFR BLD AUTO: 28.4 %
HGB BLD-MCNC: 9.2 G/DL
MCH RBC QN AUTO: 32.6 PG (ref 26–34)
MCHC RBC AUTO-ENTMCNC: 32.4 G/DL (ref 31–37)
MCV RBC AUTO: 100.7 FL
OSMOLALITY SERPL CALC.SUM OF ELEC: 302 MOSM/KG (ref 275–295)
PLATELET # BLD AUTO: 181 10(3)UL (ref 150–450)
POTASSIUM SERPL-SCNC: 4.1 MMOL/L (ref 3.5–5.1)
RBC # BLD AUTO: 2.82 X10(6)UL
SODIUM SERPL-SCNC: 141 MMOL/L (ref 136–145)
WBC # BLD AUTO: 2.7 X10(3) UL (ref 4–11)

## 2024-07-17 PROCEDURE — 99233 SBSQ HOSP IP/OBS HIGH 50: CPT | Performed by: INTERNAL MEDICINE

## 2024-07-17 PROCEDURE — 4A023N6 MEASUREMENT OF CARDIAC SAMPLING AND PRESSURE, RIGHT HEART, PERCUTANEOUS APPROACH: ICD-10-PCS | Performed by: INTERNAL MEDICINE

## 2024-07-17 PROCEDURE — 99233 SBSQ HOSP IP/OBS HIGH 50: CPT | Performed by: HOSPITALIST

## 2024-07-17 PROCEDURE — 99222 1ST HOSP IP/OBS MODERATE 55: CPT | Performed by: REGISTERED NURSE

## 2024-07-17 RX ORDER — MORPHINE SULFATE 2 MG/ML
2 INJECTION, SOLUTION INTRAMUSCULAR; INTRAVENOUS EVERY 4 HOURS PRN
Status: DISCONTINUED | OUTPATIENT
Start: 2024-07-17 | End: 2024-07-18

## 2024-07-17 RX ORDER — POLYETHYLENE GLYCOL 3350 17 G/17G
17 POWDER, FOR SOLUTION ORAL DAILY PRN
Status: DISCONTINUED | OUTPATIENT
Start: 2024-07-17 | End: 2024-07-22

## 2024-07-17 RX ORDER — BISACODYL 10 MG
10 SUPPOSITORY, RECTAL RECTAL
Status: DISCONTINUED | OUTPATIENT
Start: 2024-07-17 | End: 2024-07-22

## 2024-07-17 RX ORDER — MIDAZOLAM HYDROCHLORIDE 1 MG/ML
INJECTION INTRAMUSCULAR; INTRAVENOUS
Status: DISCONTINUED
Start: 2024-07-17 | End: 2024-07-17 | Stop reason: WASHOUT

## 2024-07-17 RX ORDER — SENNA AND DOCUSATE SODIUM 50; 8.6 MG/1; MG/1
2 TABLET, FILM COATED ORAL
Status: DISCONTINUED | OUTPATIENT
Start: 2024-07-17 | End: 2024-07-22

## 2024-07-17 RX ORDER — LIDOCAINE HYDROCHLORIDE 20 MG/ML
INJECTION, SOLUTION EPIDURAL; INFILTRATION; INTRACAUDAL; PERINEURAL
Status: COMPLETED
Start: 2024-07-17 | End: 2024-07-17

## 2024-07-17 RX ORDER — HYDROCODONE BITARTRATE AND ACETAMINOPHEN 10; 325 MG/1; MG/1
1 TABLET ORAL EVERY 4 HOURS PRN
Status: DISCONTINUED | OUTPATIENT
Start: 2024-07-17 | End: 2024-07-22

## 2024-07-17 NOTE — CONSULTS
[facility]  part of [facility]  Palliative Care Initial Consult Note    Margaret Silverio Patient Status:  Inpatient    3/14/1945 MRN [id number]   Location [facility] 3W/SW Attending Regan Cruz MD   Hosp Day # 2 PCP Johnathon Rodriguez DO     Date of Consult: 2024  Patient seen at: [facility] Inpatient    The  Cures Act makes medical notes like these available to patients in the interest of transparency. Please be advised this is a medical document. Medical documents are intended to carry relevant information, facts as evident, and the clinical opinion of the practitioner. The medical note is intended as peer to peer communication and may appear blunt or direct. It is written in medical language and may contain abbreviations or verbiage that are unfamiliar.     Reason for Consultation:   for evaluation of Palliative Care needs and Uncontrolled symptoms;Goals of care discussion.    Subjective     History of Present Illness: Margaret Silverio is a 79 year old female with history of systolic CHF EF 30%, mitral value repair, severe TVR, SSS s/p pacemaker/ICD, recent GIB d/t AVM's, rheumatoid arthritis, CKD, and AFib  who was admitted on 7/15/2024 for worsening SOB and weakness. See below for reviewed labs and imaging. Pt was admitted for treatment and evaluation of acute on chronic CHF exacerbation, L pleural effusion, acute respiratory failure and GEORGE on CKD. Pt underwent L thoracentesis  with 1L removed. . See below for reviewed imaging.     She is being followed by cardiology and pulmonary services.    I reviewed labs and imaging-see below. History was obtained from [facility] and pt.  Today is day 2 of hospitalization. This is her 6th hospitalization in the past 6 months.    I previously saw pt in the past for palliative care evaluation during her  admission.     Pt is listed as DNAR/DNI-selective in Epic,  and reviewed previously completed POLST on  file. I also reviewed previously completed HCPOA paperwork which shows her dtr Barbi as her primary HCPOA.     Reviewed symptom needs past 24 hrs: Morphine 2 mg IVP X1, Flexeril 5 mg po X1, Norco 10/325 mg po X2, Tylenol 500 mg po X1    Patient was seen and examined in bed with no family at bedside. Pt is A&OX4 and very pleasant. She appears frail and thin. +fatigue.  She tells me her breathing has improved since thoracentesis yesterday and remains on nc2L. She tells me recently she has been wearing 2L at home. She tells me she has chronic RAM symptoms and is mostly sitting much of the day. She reports chronic L neck pain from DDD and also pain from her RA in bilateral hands. She has been taking Norco 10/325 mg prn at home and typically takes 3-4 tabs a day which help a lot.  She is reporting 9/10, sharp, neck pain currently, has been using heating pad, and is asking for pain meds. Appetite is fair, denies abdominal pain, n/v and moved her bowels 2 days ago. Denies any constipation issues on opioids and tells me she doesn't take bowel regimen at home. Denies depression or anxiety symptoms. Pt remains on dobutamine gtt and will be getting another IV line placed for bumex gtt.  See physical exam and ROS below.    Review of Systems/Palliative Care symptom needs assessed:   Pertinent items are noted in HPI/below    Fatigue:  Yes  Dyspnea: denies at rest, chronic RAM   Current O2 therapy: nc2L  Cough: denies  Pain Present: chronic pain issues involving L neck  Non-verbal signs of pain present: NO  Appetite: fair  Constipation: denies  Diarrhea: denies  Nausea/Vomiting: denies  Depression: denies  Anxiety: denies    Medical History: obtained from [facility]  Past Medical History:    Acute kidney insufficiency    Anemia    Arrhythmia    Back problem    CHF (congestive heart failure) (HCC)    CKD (chronic kidney disease) stage 3, GFR 30-59 ml/min (HCC)    Deep vein thrombosis (HCC)    on B.T for only a month, possibly Jan     Essential hypertension    High blood pressure    History of blood transfusion    Rheumatoid arthritis (HCC)    Seizure disorder (HCC)    Pt denied at screening call 6/28/24     Past Surgical History:   Procedure Laterality Date    Cabg      Cardiac pacemaker placement      Colonoscopy N/A 01/17/2024    Dr. Rios    Colonoscopy N/A 01/17/2024    Procedure: COLONOSCOPY;  Surgeon: Zoraida Rios MD;  Location: [facility] ENDOSCOPY    Egd N/A 01/17/2024    Dr. Rios    Fracture surgery      Other surgical history  2017    Heart Surgery     Other surgical history  2020    Bilateral shoulder replacement        Family History: obtained from [facility]  No family history on file.    Palliative Care Social History:   Marital Status:   Children: 3 kids  Living Situation Prior to Admit: lives with   Does Patient Live Alone: no  Is Patient Confused: no  Occupational History: Retired, teacher,     Substance History:   reports that she quit smoking about 10 years ago. Her smoking use included cigarettes. She has never used smokeless tobacco.  reports no history of alcohol use.  reports no history of drug use.  Hx of Substance Use/Abuse: No    Spiritual Assessment:   Quaker: Religious   requested: declined    Allergies:  Allergies   Allergen Reactions    Lisinopril Coughing       Medications:     Current Facility-Administered Medications:     polyethylene glycol (PEG 3350) (Miralax) 17 g oral packet 17 g, 17 g, Oral, Daily PRN    bisacodyl (Dulcolax) 10 MG rectal suppository 10 mg, 10 mg, Rectal, Daily PRN    HYDROcodone-acetaminophen (Norco)  MG per tab 1 tablet, 1 tablet, Oral, Q4H PRN    morphINE PF 2 MG/ML injection 2 mg, 2 mg, Intravenous, Q4H PRN    midodrine (ProAmatine) tab 5 mg, 5 mg, Oral, BID AC    bumetanide (Bumex) 12.5 mg in 50 mL infusion, 1 mg/hr, Intravenous, Continuous    sodium chloride 0.9% infusion, , Intravenous, On Call    cyclobenzaprine (Flexeril) tab 5 mg, 5 mg, Oral, TID  PRN    heparin (Porcine) 5000 UNIT/ML injection 5,000 Units, 5,000 Units, Subcutaneous, 2 times per day    acetaminophen (Tylenol Extra Strength) tab 500 mg, 500 mg, Oral, Q4H PRN    ondansetron (Zofran) 4 MG/2ML injection 4 mg, 4 mg, Intravenous, Q6H PRN    metoclopramide (Reglan) 5 mg/mL injection 5 mg, 5 mg, Intravenous, Q8H PRN    DOBUTamine in dextrose 5% (Dobutrex) 500 mg/250mL infusion premix, 5 mcg/kg/min, Intravenous, Continuous    amiodarone (Pacerone) tab 200 mg, 200 mg, Oral, Daily    [Held by provider] carvedilol (Coreg) tab 3.125 mg, 3.125 mg, Oral, BID with meals    folic acid (Folvite) tab 800 mcg, 800 mcg, Oral, Daily    gabapentin (Neurontin) cap 300 mg, 300 mg, Oral, TID    isosorbide dinitrate (Isordil) tab 20 mg, 20 mg, Oral, TID (Nitrates)    pantoprazole (Protonix) DR tab 40 mg, 40 mg, Oral, BID AC    lidocaine-menthol 4-1 % patch 1 patch, 1 patch, Transdermal, Daily PRN    Nutritional status:  BMI: 20 Weight: 117  Weight loss: down 30 pounds in past 5 months   Current Appetite: Fair  Dysphagia: denies    Functional Status History:  ADLs: now mostly in bed/chair and needs helps with ADL's, she says she uses walker at home for short distance ambulation.      Palliative Performance Scale:   Prior to admission: 50%  Observed during hospitalization: 30%  % Ambulation Activity Level Self-Care Intake Consciousness   100 Full  Normal  No Disease Full Normal Full   90 Full  Normal  Some Disease Full Normal Full   80 Full  Normal w/effort  Some Disease Full Normal or reduced Full   70 Reduced  Can't Perform Job  Some Disease Full Normal or reduced Full   60 Reduced  Can't Perform Hobby   Significant Disease Occ Assist Normal or reduced Full or confused   50 Mainly sit/lie Can't do any work  Extensive Disease Partial Assist Normal or reduced Full or confused   40 Mainly in bed Can't do any work  Extensive Disease Mainly Assist Normal or reduced Full or confused   30 Bed Bound Can't do any  work  Extensive Disease Max Assist  Total Care Reduced  Drowsy/confused   20 Bed Bound Can't do any work  Extensive Disease Max Assist  Total Care Minimal  Drowsy/confused   10 Bed Bound Can't do any work  Extensive Disease Max Assist  Total Care Mouth Care  Drowsy/confused   0 Death        Objective      Vital Signs:  Blood pressure 141/65, pulse 60, temperature 97.8 °F (36.6 °C), temperature source Oral, resp. rate 18, weight 117 lb 14.4 oz (53.5 kg), SpO2 97%.  Body mass index is 20.24 kg/m².  Present Level of pain: 9/10 L sided neck pain  Non-verbal signs of pain present: No    Physical Exam:  General: Alert and in no apparent distress. Weak, frail, thin appearing  HEENT: No focal deficits.  Cardiac: Regular rate and rhythm, S1, S2 normal, no murmur, rub or gallop.  Lungs: Crackles bilateral bases.  Normal excursions and effort.  Abdomen: Soft, non-tender, normal bowel sounds X 4 quadrants, no rebound or guarding  Extremities: Without clubbing, cyanosis. Peripheral pulses are 2+. BLE Edema not present  Neurologic: Alert and oriented X4, normal affect.  Skin: Warm and dry.    Hematology:  Lab Results   Component Value Date    WBC 2.7 (L) 07/17/2024    HGB 9.2 (L) 07/17/2024    HCT 28.4 (L) 07/17/2024    .0 07/17/2024       Coags:  Lab Results   Component Value Date    INR 1.25 (H) 02/09/2024    PTT 35.2 02/09/2024       Chemistry:  Lab Results   Component Value Date    CREATSERUM 1.89 (H) 07/17/2024    BUN 44 (H) 07/17/2024     07/17/2024    K 4.1 07/17/2024     07/17/2024    CO2 29.0 07/17/2024    GLU 79 07/17/2024    CA 8.8 07/17/2024    ALB 4.0 07/01/2024    ALKPHO 328 (H) 06/18/2024    BILT 0.8 06/18/2024    TP 7.7 06/18/2024    AST 23 06/18/2024    ALT 11 06/18/2024    MG 2.2 07/16/2024    PHOS 3.8 07/01/2024       Imaging:  US THORACENTESIS GUIDED LEFT (CPT=32555)    Result Date: 7/16/2024  CONCLUSION: Ultrasound imaging was performed for left thoracentesis.   Dictated by (CST): Mina  MD Maxx on 7/16/2024 at 4:38 PM     Finalized by (CST): Maxx Enriquez MD on 7/16/2024 at 4:38 PM          XR CHEST AP PORTABLE  (CPT=71045)    Result Date: 7/16/2024  CONCLUSION:  1.  Post left thoracentesis.  Moderate-sized left pleural effusion which is otherwise smaller since prior exam.  Improved aeration of the left lung.  No pneumothorax. 2.  28 mm diameter nodular opacity in the right lung base likely representing atelectatic change however it is more discrete on this exam and recommend attention to this finding at time of follow-up imaging to exclude other pneumonitis or mass.   Dictated by (CST): Maxx Enriquez MD on 7/16/2024 at 4:31 PM     Finalized by (CST): Maxx Enriquez MD on 7/16/2024 at 4:34 PM          XR CHEST AP PORTABLE  (CPT=71045)    Result Date: 7/15/2024  CONCLUSION:   Enlarging moderate to large left pleural effusion with associated increase in the opacities in the left perihilar region and left lower lobe, which are favored to reflect atelectasis with or without superimposed pneumonia.  Progressive pulmonary edema.  No significant change in the right pleural effusion with associated right perihilar and right lower lobe opacities.  Lesser incidental findings described above.    Dictated by (CST): Anshul Joseph MD on 7/15/2024 at 3:39 PM     Finalized by (CST): Anshul Joseph MD on 7/15/2024 at 3:42 PM            6/20/24 Echocardiogram  Conclusions:     1. Left ventricle: The cavity size was normal. Wall thickness was mildly      increased. Systolic function was markedly reduced. The estimated ejection      fraction was 30-35%, by biplane method of disks. Dyskinesis of the      anteroseptal and anterior walls. Unable to assess LV diastolic function      due to valvular repair/replacement.   2. Right ventricle: The cavity size was increased. Pacer wire noted in the      right ventricle.   3. Left atrium: The atrium was markedly dilated.   4. Right atrium: The atrium was dilated.   5. Atrial  septum: There was no atrial level shunt.   6. Aortic valve: The findings were consistent with very mild stenosis. The      peak systolic velocity was 1.71m/sec. The mean systolic gradient was 6mm      Hg. The valve area (VTI) was 1.71cm^2.   7. Mitral valve: A bioprosthesis is present. There was no significant      regurgitation. The mean diastolic gradient was 6mm Hg.   8. Tricuspid valve: There was wide-open regurgitation.   Impressions:  This study is compared with previous dated 2/10/2024:   *     6/3/24 MRI Cervical spine  CONCLUSION:   Motion-degraded examination. Within these parameters:   1. Multilevel degenerative disc disease throughout the cervical spine with ventral cord indentation at C3-C4 and C4-C5.      2. Multilevel foraminal compromise.      3. No abnormal intrinsic cord signal intensity is apparent.      4. No acute osseous injury of the cervical spine is detected.      5. Nonspecific prevertebral edema or retropharyngeal fluid accumulation. No drainable collection is evident. This may be reactive in nature, but correlation for relevant symptomatology is recommended.      6. Pleural effusions are partially delineated.      7. Trace left mastoid effusion.      8. Lesser incidental findings as above     Summary of GOC Discussion        I discussed reason for palliative care consultation with patient.     I differentiated the palliative treatment-focus model versus the hospice comfort-focused philosophy of care. I informed the patient/family that having palliative care support does not limit medical treatment options or decisions to those who wish to continue curative or restorative medical therapies. I discussed the benefits of palliative care to include assistance with arising symptom management needs, an extra layer of support, to ensure GOC are respected throughout healthcare continuum, and assist with transition to hospice care when appropriate.  Palliative care handout  provided.    Outpatient/Community Palliative Care Services:  Usually visit once per 4 weeks depending on contracted agency guidelines  Focus on GOC and symptom management   Palliative Care criteria:  Not altered by prognosis   Does not limit curative or restorative therapies      Outpatient Hospice services:  24/7 phone triage services   RN visit one or more times per week depending on need  Home health aid to assist in ADLs/hygiene   Hospice criteria:  Less than six-month prognosis   Must forego most life-prolonging  measures/treatments   Focus solely on comfort   Must sign onto hospice benefit with agency       Prognostic awareness/understanding: Fair-needs reinforcement by MDs    -I  discussed current clinical condition and explored previous discussions with MDs.    -I discussed the normal disease trajectory of CHF, severe TVR with associated symptoms and decline over time. CHF teaching provided and encouraged good compliance with medications and lifestyle.     -We talked about her recurrent recent hospitalizations over the past 6 months with functional decline.     Hopes/goals/concerns:   -Pt tells me she would like to talk with cardiology further about possible R heart cath today to hear more about the risks with this test before proceeding.     -Pt says she wants to continue with supportive care and tells me she is \"not giving up yet.\" I did provide education on option for comfort care with hospice, but she is not ready at this time.     -Pt is also hoping for improved pain control while in the hospital and I discussed changing her Norco frequency to Q 4 hrs prn and will add low dose Morphine IVP prn severe BTP. She does want to keep her comfort a priority.     -Pt is hopeful to return home with her  on dc. I recommended having a community palliative care program following on dc.     -Discussed ongoing GOC discussions will be needed over time and she is agreeable to palliative care following.      Advance Care Planning counseling and discussion:     -I confirmed pt's dtr Barbi is primary HCPOA and confirmed wishes for DNAR/DNI-selective.     -Pt gave me permission to call her dtr Barbi with update on today's visit. I spoke to Barbi who remembered me from previous admission and I reviewed our discussion from today    -Provided emotional support to pt/dtr who are coping adequately.     Procedures:  No intubation    Assessment and Recommendations      Problem List:    Acute on chronic severe non-ischemic biventricular CHF   Severe TVR  L pleural effusion s/p thora on 7/16 1L removed  GEORGE on CKD  Hypoxia  Hypotension on midodrine  H/o Bioprosthetic mitral valve  H/o L subclavian DVT  Pacemaker/AICD  Pancytopenia  Pafib  H/o GIB  Rheumatoid arthritis  Weakness     Chronic neck pain r/t multilevel DDD/RA  -Ongoing issue  -Continue Tylenol 500 mg po Q 4 hrs prn  -Change home Norco 10/325 mg 1 tab po Q 4 hrs prn   -Continue Gabapentin 300 mg po TID  -Continue Flexeril 5 mg po TID prn  -Add Morphine 2 mg IVP Q 4 hrs prn severe BTP/dyspnea  -Heating pad prn  -Will continue to monitor pain symptoms closely.      Constipation  -Stable, no constipation issues currently  -Add Miralax daily prn, Senna S 2 tabs daily prn, ducolax supp prn while on opioid tx       Goals of care counseling  -see above for details  -Confirmed wishes for DNAR/DNI-selective and continue supportive care.  -Pt would like to discuss R heart cath further with cardiology prior to proceeding.  -Ongoing GOC discussions will be needed over time.  -Pt/dtr are aware of the option for comfort care with hospice, NOT ready for this right now.  -Agreeable to palliative care following  -Dispo:  Pt prefers to return home on dc. Recommended having a community palliative care program following.  SW to help with dc planning.   -Provided emotional support to pt/family who are coping adequately.     Advance care planning  -Pt's dtr Barbi Silverio is primary  HCPOA #[phone].  -Previously completed POLST/ HCPOA paperwork in EPIC.    Palliative Performance Scale 40%    Emotion support provided to patient/family today: Yes    A total of 55 mins were spent on this consult, which included all of the following:direct face to face contact, history taking, physical examination, and >50% was spent counseling and coordinating care.    Discussed today's visit with message to Dr. Cruz, Dr. Boles, Dr. Liao, Rachele GIL and BRADEN Andrews.    I will continue to follow clinically.    Thank you for allowing Palliative Care services to participate in the care of Ms. Margaret Silverio.       Aparna Mahan, ANP-BC, [facility] [id number]  7/17/2024  1:41 PM  Palliative Care Services    Wants a covid test, denies symptoms

## 2024-07-17 NOTE — CM/SW NOTE
11:00AM  Received MDO and message from YOLIE Braun w/ Palliative Care - pt/family agreeable to [facility] at time of DC.    [facility] referral sent in [facility] for review. Pino Perera w/ [facility] notified of new referral.     Pending an accepting [facility] provider who is in network w/ pt's insurance.    01:35PM  Received confirmation that [facility] can accept for [facility]. Liaminor Perera to speak w/ pt and dtr.    PLAN: Home w/ [facility]/ [facility] - pending  med clear        SW/CM to remain available for support and/or discharge planning.            Juliana Rodrigez, MSW, LSW [phone]

## 2024-07-17 NOTE — PLAN OF CARE
R heart cath without intervention. Cath recovery until 18:45.  updated on plan of care.    Problem: Patient Centered Care  Goal: Patient preferences are identified and integrated in the patient's plan of care  Description: Interventions:  - What would you like us to know as we care for you? I'm from home with my family  - Provide timely, complete, and accurate information to patient/family  - Incorporate patient and family knowledge, values, beliefs, and cultural backgrounds into the planning and delivery of care  - Encourage patient/family to participate in care and decision-making at the level they choose  - Honor patient and family perspectives and choices  Outcome: Progressing     Problem: Patient/Family Goals  Goal: Patient/Family Long Term Goal  Description: Patient's Long Term Goal: discharge home    Interventions:  - monitor vitals, labs, imaging  - cards, pulm on consult  - See additional Care Plan goals for specific interventions  Outcome: Progressing  Goal: Patient/Family Short Term Goal  Description: Patient's Short Term Goal: manage pain    Interventions:   - Frequent pain assessments  - PRN pain meds  - non-pharmacological interventions  - See additional Care Plan goals for specific interventions  Outcome: Progressing     Problem: CARDIOVASCULAR - ADULT  Goal: Maintains optimal cardiac output and hemodynamic stability  Description: INTERVENTIONS:  - Monitor vital signs, rhythm, and trends  - Monitor for bleeding, hypotension and signs of decreased cardiac output  - Evaluate effectiveness of vasoactive medications to optimize hemodynamic stability  - Monitor arterial and/or venous puncture sites for bleeding and/or hematoma  - Assess quality of pulses, skin color and temperature  - Assess for signs of decreased coronary artery perfusion - ex. Angina  - Evaluate fluid balance, assess for edema, trend weights  Outcome: Progressing  Goal: Absence of cardiac arrhythmias or at baseline  Description:  INTERVENTIONS:  - Continuous cardiac monitoring, monitor vital signs, obtain 12 lead EKG if indicated  - Evaluate effectiveness of antiarrhythmic and heart rate control medications as ordered  - Initiate emergency measures for life threatening arrhythmias  - Monitor electrolytes and administer replacement therapy as ordered  Outcome: Progressing     Problem: RESPIRATORY - ADULT  Goal: Achieves optimal ventilation and oxygenation  Description: INTERVENTIONS:  - Assess for changes in respiratory status  - Assess for changes in mentation and behavior  - Position to facilitate oxygenation and minimize respiratory effort  - Oxygen supplementation based on oxygen saturation or ABGs  - Provide Smoking Cessation handout, if applicable  - Encourage broncho-pulmonary hygiene including cough, deep breathe, Incentive Spirometry  - Assess the need for suctioning and perform as needed  - Assess and instruct to report SOB or any respiratory difficulty  - Respiratory Therapy support as indicated  - Manage/alleviate anxiety  - Monitor for signs/symptoms of CO2 retention  Outcome: Progressing     Problem: PAIN - ADULT  Goal: Verbalizes/displays adequate comfort level or patient's stated pain goal  Description: INTERVENTIONS:  - Encourage pt to monitor pain and request assistance  - Assess pain using appropriate pain scale  - Administer analgesics based on type and severity of pain and evaluate response  - Implement non-pharmacological measures as appropriate and evaluate response  - Consider cultural and social influences on pain and pain management  - Manage/alleviate anxiety  - Utilize distraction and/or relaxation techniques  - Monitor for opioid side effects  - Notify MD/LIP if interventions unsuccessful or patient reports new pain  - Anticipate increased pain with activity and pre-medicate as appropriate  Outcome: Progressing

## 2024-07-17 NOTE — OCCUPATIONAL THERAPY NOTE
OCCUPATIONAL THERAPY EVALUATION - INPATIENT     Room Number: [facility] IVS/[facility]  Evaluation Date: 7/17/2024  Type of Evaluation: Initial  Presenting Problem: Acute on chronic CHF    Physician Order: IP Consult to Occupational Therapy  Reason for Therapy: ADL/IADL Dysfunction and Discharge Planning    OCCUPATIONAL THERAPY ASSESSMENT   Patient is a 79 year old female admitted 7/15/2024 for CHF.  Prior to admission, patient's baseline is Mod I with most ADLS but does require assist on \"bad days\"; uses RW on \"bad days\" and no device on the good ones; sometimes spouse will push her in an office chair if her pain is high.  Patient is currently functioning near baseline with self care and basic mobility.  Patient is requiring up to Min-Mod A  as a result of the following impairments: pain, activity tolerance, endurance. Occupational Therapy will continue to follow for duration of hospitalization.    Patient will benefit from continued skilled OT Services at discharge to promote prior level of function and safety with additional support and return home with home health OT    PLAN  OT Treatment Plan: Balance activities;Energy conservation/work simplification techniques;ADL training;Functional transfer training;Endurance training;Patient/Family education;Patient/Family training;Compensatory technique education  OT Device Recommendations: TBD    OCCUPATIONAL THERAPY MEDICAL/SOCIAL HISTORY   Problem List   Principal Problem:    Acute on chronic congestive heart failure, unspecified heart failure type (HCC)  Active Problems:    Goals of care, counseling/discussion    Advance care planning    Palliative care by specialist    Acute on chronic systolic (congestive) heart failure (HCC)    HOME SITUATION  Type of Home: House  Home Layout: One level  Lives With: Spouse (graddaughter)    SUBJECTIVE  My granddaughter came from [place] to visit me.     OCCUPATIONAL THERAPY EXAMINATION   OBJECTIVE  Precautions: Bed/chair alarm  Fall Risk: High  fall risk    WEIGHT BEARING RESTRICTION     PAIN ASSESSMENT  Rating: Unable to rate  Location: chronic neck/joint pain  Management Techniques: Activity promotion    ACTIVITY TOLERANCE           BP: 129/61             O2 SATURATIONS  Oxygen Therapy  SPO2% on Oxygen at Rest: 93  At rest oxygen flow (liters per minute): 2    COGNITION  Overall Cognitive Status:  WFL - within functional limits    RANGE OF MOTION   Upper extremity ROM is within functional limits     STRENGTH ASSESSMENT  Upper extremity strength is within functional limits    ACTIVITIES OF DAILY LIVING ASSESSMENT  AM-PAC ‘6-Clicks’ Inpatient Daily Activity Short Form  How much help from another person does the patient currently need…  -   Putting on and taking off regular lower body clothing?: A Little  -   Bathing (including washing, rinsing, drying)?: A Little  -   Toileting, which includes using toilet, bedpan or urinal? : A Little  -   Putting on and taking off regular upper body clothing?: A Little  -   Taking care of personal grooming such as brushing teeth?: A Little  -   Eating meals?: A Little    AM-PAC Score:  Score: 18  Approx Degree of Impairment: 46.65%  Standardized Score (AM-PAC Scale): 38.66  CMS Modifier (G-Code): CK    FUNCTIONAL TRANSFER ASSESSMENT     BED MOBILITY  Rolling: Minimal Assist  Supine to Sit : Minimal Assist  Sit to Supine (OT): Minimal Assist  Scooting: Min A    BALANCE ASSESSMENT  Static Sitting: Stand-by Assist    FUNCTIONAL ADL ASSESSMENT  Eating: Supervision  Grooming Seated: Minimal Assist  Bathing Seated: Moderate Assist  UB Dressing Seated: Minimal Assist  LB Dressing Seated: Moderate Assist  Toileting Seated: Minimal Assist    THERAPEUTIC EXERCISE     Skilled Therapy Provided: Activity promotion, mobility training, energy conservation, tolerated sitting up on side of bed with staff assist; deferred further activity as cardiology presented at bedside to discuss planned procedure; will benefit from continued therapy  in prep to return home    EDUCATION PROVIDED  Patient: Role of Occupational Therapy; Plan of Care; Discharge Recommendations  Patient's Response to Education: Verbalized Understanding    The patient's Approx Degree of Impairment: 46.65% has been calculated based on documentation in the [facility] '6 clicks' Inpatient Daily Activity Short Form.  Research supports that patients with this level of impairment may benefit from home with HH.  Final disposition will be made by interdisciplinary medical team.    Patient End of Session: With  staff    OT Goals  Patient self-stated goal is: to return home     Patient will complete LE dressing with SBA   Comment:     Patient will complete toilet transfer with SBA   Comment:     Patient will complete self care task at sink level with SBA    Comment:     Comment:         Goals  on: 8/15  Frequency:3-5x week         Patient Evaluation Complexity Level:   Occupational Profile/Medical History MODERATE - Expanded review of history including review of medical or therapy record   Specific performance deficits impacting engagement in ADL/IADL MODERATE  3 - 5 performance deficits   Client Assessment/Performance Deficits MODERATE - Comorbidities and min to mod modifications of tasks    Clinical Decision Making MODERATE - Analysis of occupational profile, detailed assessments, several treatment options    Overall Complexity MODERATE     OT Session Time: 18 minutes  Self-Care Home Management: 0 minutes  Therapeutic Activity: 18 minutes    Leonard Molina, Occupational Therapist, OTR/L ext [phone]

## 2024-07-17 NOTE — PAYOR COMM NOTE
--------------  ADMISSION REVIEW     Payor: [employer]  Subscriber #:  [id number]  Authorization Number: [id number]    Admit date: 7/15/24  Admit time:  6:11 PM       History   HPI  The patient presents to the ED complaining of shortness of breath and some palpitations starting yesterday.  She states that shortness of breath is worsened progressed over the past few days.  Daughter states that she is noncompliant with her Bumex.  Patient has had many admissions for heart failure exacerbations over the past 6 months.    ED Triage Vitals [07/15/24 1258]   /68   Pulse 63   Resp 18   Temp 97.6 °F (36.4 °C)   Temp src Oral   SpO2 92 %   O2 Device Nasal cannula     HENT:      Head: Normocephalic and atraumatic.   Eyes:      General:         Right eye: No discharge.         Left eye: No discharge.      Conjunctiva/sclera: Conjunctivae normal.   Neck:      Trachea: No tracheal deviation.   Cardiovascular:      Rate and Rhythm: Normal rate and regular rhythm.   Pulmonary:      Effort: Pulmonary effort is normal. No respiratory distress.      Breath sounds: No stridor. Rales present.   Abdominal:      General: There is no distension.      Palpations: Abdomen is soft.   Musculoskeletal:         General: No deformity.   Skin:     General: Skin is warm and dry.   Neurological:      Mental Status: She is alert and oriented to person, place, and time.     Labs Reviewed   BASIC METABOLIC PANEL (8) - Abnormal; Notable for the following components:       Result Value    Creatinine 2.27 (*)     eGFR-Cr 22 (*)     All other components within normal limits   BNP (B TYPE NATRIURETIC PEPTIDE) - Abnormal; Notable for the following components:    Beta Natriuretic Peptide 1,944 (*)     All other components within normal limits   RBC MORPHOLOGY SCAN - Abnormal; Notable for the following components:    RBC Morphology See morphology below (*)     All other components within normal limits   REDRAW BMP - Abnormal; Notable for  the following components:    Potassium 5.3 (*)     BUN 51 (*)    CBC W/ DIFFERENTIAL - Abnormal; Notable for the following components:    WBC 1.5 (*)     RBC 2.97 (*)     HGB 9.6 (*)     HCT 30.0 (*)     .0 (*)     RDW-SD 67.5 (*)     RDW 20.0 (*)     Neutrophil Absolute Prelim 0.86 (*)     Neutrophil Absolute 0.86 (*)     Lymphocyte Absolute 0.45 (*)     Monocyte Absolute 0.09 (*)    XR CHEST AP PORTABLE  (CPT=71045)  Result Date: 7/15/2024  CONCLUSION:   Enlarging moderate to large left pleural effusion with associated increase in the opacities in the left perihilar region and left lower lobe, which are favored to reflect atelectasis with or without superimposed pneumonia.  Progressive pulmonary edema.  No significant change in the right pleural effusion with associated right perihilar and right lower lobe opacities.  Lesser incidental findings described above.    Dictated by (CST): Anshul Joseph MD on 7/15/2024 at 3:39 PM     Finalized by (CST): Anshul Joseph MD on 7/15/2024 at 3:42 PM         ED Medications Administered:   Medications   heparin (Porcine) 5000 UNIT/ML injection 5,000 Units (5,000 Units Subcutaneous Given 7/15/24 2000)   acetaminophen (Tylenol Extra Strength) tab 500 mg (has no administration in time range)   ondansetron (Zofran) 4 MG/2ML injection 4 mg (has no administration in time range)   metoclopramide (Reglan) 5 mg/mL injection 5 mg (has no administration in time range)   DOBUTamine in dextrose 5% (Dobutrex) 500 mg/250mL infusion premix (5 mcg/kg/min × 56.6 kg Intravenous New Bag 7/15/24 2001)   amiodarone (Pacerone) tab 200 mg (has no administration in time range)   carvedilol (Coreg) tab 3.125 mg ( Oral Automatically Held 7/18/24 1800)   folic acid (Folvite) tab 800 mcg (has no administration in time range)   gabapentin (Neurontin) cap 300 mg (300 mg Oral Given 7/15/24 2000)   HYDROcodone-acetaminophen (Norco)  MG per tab 1 tablet (1 tablet Oral Given 7/15/24 1955)    isosorbide dinitrate (Isordil) tab 20 mg (20 mg Oral Given 7/15/24 1955)   pantoprazole (Protonix) DR tab 40 mg (40 mg Oral Given 7/15/24 1955)   cyclobenzaprine (Flexeril) tab 5 mg (has no administration in time range)   bumetanide (Bumex) 0.25 MG/ML injection 2 mg (2 mg Intravenous Given 7/15/24 1809)       Disposition and Plan   Clinical Impression:  1. Acute on chronic congestive heart failure, unspecified heart failure type (HCC)      Disposition:  Admit  HISTORY AND PHYSICAL EXAMINATION     CHIEF COMPLAINT:  Recurrent acute on chronic systolic heart failure with large bilateral pleural effusions.     HISTORY OF PRESENT ILLNESS:  The patient is a 79-year-old  female with underlying severe nonischemic cardiomyopathy and multiple recent hospitalizations for heart failure, came in today to the emergency department with recurrent shortness of breath for at least 1 week.  She said she has been taking her Bumex.  Sometimes she skips 1 dose or 2 because she is incontinent.  CBC today showed white blood cell count of 1.5, platelet count 221, hemoglobin 9.6 which is at baseline, .0.  Chemistry and B-natriuretic peptide, both are still pending.  Chest x-ray showed bilateral large pleural effusions, worse on the left than the right.      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     PAST SURGICAL HISTORY:  Mitral valve replacement with atrial appendage closure, bilateral total shoulder arthroplasty.     REVIEW OF SYSTEMS:  Progressive dyspnea even on exertion.  The patient feels that she cannot catch her breath.  She had  poor appetite with progressive weight loss.  No fever or chills.       PHYSICAL EXAMINATION:    GENERAL:  Alert, oriented to time, place, and person, visibly dyspneic, mild to moderate distress.  VITAL SIGNS:  Temperature 97.6, pulse 60, respiratory rate 20, blood pressure 112/62, pulse ox 99% on 2L nasal cannula oxygen.  HEENT:  Atraumatic.  Oropharynx clear.  Dry mucous membranes.  Normal hard and soft palate.  Eyes:  Anicteric sclerae.   NECK:  Supple.  No lymphadenopathy.  Positive jugular venous distention.   LUNGS:  Diminished breathing sounds both lung bases.  HEART:  Regular rate and rhythm.  S1 and S2 auscultated.  No murmur.  ABDOMEN:  Soft, nondistended.  No tenderness.  Positive bowel sounds.  EXTREMITIES:  Trace edema.  No clubbing or cyanosis.  NEUROLOGIC:  Motor and sensory intact.      ASSESSMENT AND PLAN:    1.       Recurrent acute on chronic systolic heart failure.  2.       Bilateral left more than right pleural effusions with pulmonary edema.  3.       Rheumatoid arthritis.  4.       Chronic kidney disease stage 3 to 4.     The patient will be admitted to telemetry floor.  Cardiology to evaluate the patient and possibly initiate IV Bumex and milrinone drip.  The patient was given IV Bumex in the emergency room.  We will continue to monitor her hemodynamic status.  Monitor kidney function and electrolytes.        7/16/24  SOB x 1 week. Was started on o2 7/9 with no improvement.      Subjective:   Margaret Silverio is still SOB no CP no cough. Pt c/o head pain. She had a GIUSEPPE for her neck pain recently that helped but headache is severe sharp - she is tearful.      Blood pressure 112/56, pulse 60, temperature 98.4 °F (36.9 °C), temperature source Oral, resp. rate 18, weight 123 lb 12.8 oz (56.2 kg), SpO2 98%.     Physical Exam:    Respiratory: Diminished L side. Crackles R side.   Cardiovascular: S1, S2. Regular rate and rhythm. No murmurs, rubs or gallops.   Abdomen: Soft, nontender,  nondistended.  Positive bowel sounds. No rebound or guarding.  Neurologic: No focal neurological deficits.   Musculoskeletal: Moves all extremities.  Extremities: No edema.  Lab 07/15/24  1455 07/16/24  0701   WBC 1.5* 1.8*   HGB 9.6* 9.6*   .0* 101.0*   .0 209.0      Lab 07/15/24  1455 07/15/24  1634 07/16/24  0701   GLU 96  --  78   BUN  --  51* 46*   CREATSERUM 2.27*  --  1.96*   CA 9.2  --  8.9     --  142   K  --  5.3* 4.2     --  106   CO2 27.0  --  28.0    Assessment & Plan:       Nonischemic Acute on chronic HFrEF, biventricular   Large L pleural effusion   Dobutamine gtt   Bumex 2mg IV x 1 today   Pulmonary consult for L thoracentesis   GDMT: Hold home farxiga, aldactone, coreg, entresto due to hypotension.   [facility] 2022 - normal coronaries.   Cards on consult  I/O, daily weights.   ECHO LVEF 30-35% dyskinesis of anterior walls. RV size increased  Large L pleural effusion   Hypoxia   Thoracentesis today   Pulm on consult.   Was recently placed on o2 at home on 7/9 2L continuous prior to this not on home o2  H/o paroxysmal A.fib   AV paced.   H/o BRANDYN occlusion   Not on A/C due to h/o GI bleed.   Amiodarone   Leukopenia   WBC 1.5   Hold methotrexate  Rpt labs in AM.   GEORGE on CKD IV   Baseline creat 1.4 -1.6 per EMR.   BUN/creat trending down with diuresis.   Rpt labs in AM.   Hypotension   Midodrine 2.5mg BID  Hold isordil for sBP < 100  Hold GDMT due to hypotension   Acute on chronic neck pain with radiculopathy  Multilevel spondylosis C3-5  Was seen by neurosurgery not a surgical candidate.   Pain control.   Palliative care consult   Cont gabapentin increase to 400mg TID, cont norco PRN  Recently has GIUSEPPE outpt   Other medical problems  Severe RA with multiple joint deformities        CARDIOLOGY  Assessment/Plan:  Nonischemic Severe Biventricular failure presenting with weakness and dyspnea  L Left pleural effusion noted  Dobutamine and dose of IV Bumex yesterday   -300 with minimal  output reported-weight minimally changed - renal function improved s/p diuresis x 1 with continued abdominal fullness consistent with R sided ventricular failure and ascites  Historically on Farxiga, aldactone, coreg, Entresto, and Bumex - currently all held d/t hypotension  Normal coronaries [facility] 2022  L sided pleural effusion  Would likely benefit from thoracentesis  Denies chronic oxygen use at home now on 5L  Hx of paroxysmal atrial flutter/fib  AV paced  Hx of L atrial appendage occlusion  Remains on daily amiodarone  Hx of surgical mitral valve replacement  Hx of severe GI bleeding this year  Reports scant hematochezia this AM hgb stable  Rheumatoid arthritis  On methotrexate  Chronic changes noted  Leukopenia  1.8 mild improvement since presentation at 1.5  On methotrexate for RA  Defer     PLAN  Bumex IV 2 mg today  Continue dobutamine 7.5 mcg  Consider thoracentesis for pleural effusion  Restart GDMT as tolerated by BP currently held  Continue amiodarone  Continue to monitor CHF order set and telemetry closely  Frequent RV pacing, LV dysfunction, and QRS >200 ms may benefit from BiV?       PULMONOLOGY  HPI  This is a very pleasant 79-year-old female with history of severe nonischemic cardiomyopathy HFrEF, CKD , pacemaker , severe RA with joint deformities .     Patient admitted on 7/15/2024 with dyspnea and chest x-ray showed moderate right pleural effusion and large left sided pleural effusion with high BNP  No active cough or sputum and denies fever or chills  Patient was on 2 L of oxygen increased to 5 L  Comfortable at rest and started on dobutamine drip  Worsening renal function  Severe neutropenia for unknown etiology  No reported high fever or chills  Headache and neck pain which is chronic with severe arthritis and chronic neck pain and headache  Generalized fatigue and weakness  No abdominal pain or vomiting or diarrhea  Muscle wasting and fatigue    Impression:       1-acute respiratory failure  with hypoxia  Severe cardiomyopathy HFrEF with  large pleural effusion L>>R   CXR large left pleural effusion and small right effusion      On 5 L NC   CHF management per cardiology  Will proceed with left-sided thoracentesis d/w pt and daughter and agreeable      2-severe nonischemic cardiomyopathy HFrEF with right heart failure  History of pacemaker  Cardiology following     3-cardiorenal syndrome with chronic kidney disease     4-severe neutropenia  ?  Etiology possible related to methotrexate     5-severe rheumatoid arthritis with joints deformities  On methotrexate and now on hold for severe neutropenia     6-DVT prophylaxis  Heparin subcu      MEDICATIONS ADMINISTERED IN LAST 1 DAY:  acetaminophen (Tylenol Extra Strength) tab 500 mg       Date Action Dose Route User    7/16/2024 1244 Given 500 mg Oral Cassandra Rush RN          amiodarone (Pacerone) tab 200 mg       Date Action Dose Route User    7/17/2024 0815 Given 200 mg Oral Rachele Larry RN          bumetanide (Bumex) 0.25 MG/ML injection 2 mg       Date Action Dose Route User    7/16/2024 1248 Given 2 mg Intravenous Cassandra Rush RN          chlorhexidine (Hibiclens) 4 % external liquid       Date Action Dose Route User    7/17/2024 0400 Given (none) Topical Breanna Diamond RN          cyclobenzaprine (Flexeril) tab 5 mg       Date Action Dose Route User    7/16/2024 2201 Given 5 mg Oral Breanna Diamond RN          DOBUTamine in dextrose 5% (Dobutrex) 500 mg/250mL infusion premix       Date Action Dose Route User    7/16/2024 1620 Rate/Dose Change 5 mcg/kg/min × 56.6 kg Intravenous Cassandra Rush RN          folic acid (Folvite) tab 800 mcg       Date Action Dose Route User    7/17/2024 0816 Given 800 mcg Oral Rachele Larry RN          gabapentin (Neurontin) cap 300 mg       Date Action Dose Route User    7/17/2024 0816 Given 300 mg Oral Rachele Larry RN    7/16/2024 2005 Given 300 mg Oral Breanna Diamond RN    7/16/2024 1622 Given 300  mg Oral Cassandra Rush RN          heparin (Porcine) 5000 UNIT/ML injection 5,000 Units       Date Action Dose Route User    7/17/2024 0816 Given 5,000 Units Subcutaneous (Right Lower Abdomen) Rachele Larry RN    7/16/2024 2005 Given 5,000 Units Subcutaneous (Left Lower Abdomen) Breanna Diamond RN          HYDROcodone-acetaminophen (Norco)  MG per tab 1 tablet       Date Action Dose Route User    7/16/2024 1444 Given 1 tablet Oral Cassandra Rush RN          HYDROcodone-acetaminophen (Norco)  MG per tab 1 tablet       Date Action Dose Route User    7/17/2024 0539 Given 1 tablet Oral Breanna Diamond RN    7/16/2024 2201 Given 1 tablet Oral Breanna Diamond RN          isosorbide dinitrate (Isordil) tab 20 mg       Date Action Dose Route User    7/17/2024 0816 Given 20 mg Oral Rachele Larry RN          midodrine (ProAmatine) tab 5 mg       Date Action Dose Route User    7/16/2024 1644 Given 5 mg Oral Sakshi Goldberg          morphINE PF 2 MG/ML injection 2 mg       Date Action Dose Route User    7/17/2024 0117 Given 2 mg Intravenous Breanna Diamond RN          pantoprazole (Protonix) DR tab 40 mg       Date Action Dose Route User    7/17/2024 0537 Given 40 mg Oral Breanna Diamond RN    7/16/2024 1622 Given 40 mg Oral Cassandra Rush RN            Vitals (last day)       Date/Time Temp Pulse Resp BP SpO2 Weight O2 Device O2 Flow Rate (L/min) Who    07/17/24 0810 97.8 °F (36.6 °C) 60 18 141/65 97 % -- Nasal cannula 2 L/min TR    07/17/24 0534 97.8 °F (36.6 °C) 60 20 130/55 98 % -- Nasal cannula 2 L/min CB    07/16/24 2004 97.8 °F (36.6 °C) 60 16 99/51 95 % -- Nasal cannula 3 L/min CB    07/16/24 1619 -- 60 -- 84/48 -- -- -- -- AR    07/16/24 1439 98.6 °F (37 °C) 60 18 108/44 96 % -- Nasal cannula 5 L/min AR    07/16/24 0853 98.4 °F (36.9 °C) 60 18 86/48 98 % -- Nasal cannula 5 L/min AR    07/16/24 0305 97.7 °F (36.5 °C) 60 20 111/54 98 % -- Nasal cannula 5 L/min MC

## 2024-07-17 NOTE — PLAN OF CARE
Problem: Patient Centered Care  Goal: Patient preferences are identified and integrated in the patient's plan of care  Description: Interventions:  - What would you like us to know as we care for you? I'm from home with my family  - Provide timely, complete, and accurate information to patient/family  - Incorporate patient and family knowledge, values, beliefs, and cultural backgrounds into the planning and delivery of care  - Encourage patient/family to participate in care and decision-making at the level they choose  - Honor patient and family perspectives and choices  Outcome: Progressing     Problem: Patient/Family Goals  Goal: Patient/Family Long Term Goal  Description: Patient's Long Term Goal: discharge home    Interventions:  - monitor vitals, labs, imaging  - cards, pulm on consult  - See additional Care Plan goals for specific interventions  Outcome: Progressing  Goal: Patient/Family Short Term Goal  Description: Patient's Short Term Goal: manage pain    Interventions:   - Frequent pain assessments  - PRN pain meds  - non-pharmacological interventions  - See additional Care Plan goals for specific interventions  Outcome: Progressing     Problem: CARDIOVASCULAR - ADULT  Goal: Maintains optimal cardiac output and hemodynamic stability  Description: INTERVENTIONS:  - Monitor vital signs, rhythm, and trends  - Monitor for bleeding, hypotension and signs of decreased cardiac output  - Evaluate effectiveness of vasoactive medications to optimize hemodynamic stability  - Monitor arterial and/or venous puncture sites for bleeding and/or hematoma  - Assess quality of pulses, skin color and temperature  - Assess for signs of decreased coronary artery perfusion - ex. Angina  - Evaluate fluid balance, assess for edema, trend weights  Outcome: Progressing  Goal: Absence of cardiac arrhythmias or at baseline  Description: INTERVENTIONS:  - Continuous cardiac monitoring, monitor vital signs, obtain 12 lead EKG if  indicated  - Evaluate effectiveness of antiarrhythmic and heart rate control medications as ordered  - Initiate emergency measures for life threatening arrhythmias  - Monitor electrolytes and administer replacement therapy as ordered  Outcome: Progressing     Problem: RESPIRATORY - ADULT  Goal: Achieves optimal ventilation and oxygenation  Description: INTERVENTIONS:  - Assess for changes in respiratory status  - Assess for changes in mentation and behavior  - Position to facilitate oxygenation and minimize respiratory effort  - Oxygen supplementation based on oxygen saturation or ABGs  - Provide Smoking Cessation handout, if applicable  - Encourage broncho-pulmonary hygiene including cough, deep breathe, Incentive Spirometry  - Assess the need for suctioning and perform as needed  - Assess and instruct to report SOB or any respiratory difficulty  - Respiratory Therapy support as indicated  - Manage/alleviate anxiety  - Monitor for signs/symptoms of CO2 retention  Outcome: Progressing     Problem: PAIN - ADULT  Goal: Verbalizes/displays adequate comfort level or patient's stated pain goal  Description: INTERVENTIONS:  - Encourage pt to monitor pain and request assistance  - Assess pain using appropriate pain scale  - Administer analgesics based on type and severity of pain and evaluate response  - Implement non-pharmacological measures as appropriate and evaluate response  - Consider cultural and social influences on pain and pain management  - Manage/alleviate anxiety  - Utilize distraction and/or relaxation techniques  - Monitor for opioid side effects  - Notify MD/LIP if interventions unsuccessful or patient reports new pain  - Anticipate increased pain with activity and pre-medicate as appropriate  Outcome: Progressing     Patient alert and oriented x4.  PRN Norco and Morphine for pain.  Call light within reach.  Dobutamine drip in progress.  Vascular access team on consult for difficult IV access and to start  Bumex drip once access line in place.  Safety precautions in place.

## 2024-07-17 NOTE — PROGRESS NOTES
[facility] Liaison received palliative referral for community PC services. Waiting to obtain insurance (verification/authorization) prior to pursuing.    1:40pm: insurance accepted. PL will follow-up with patient and daughter regarding CPC.     Dilma Lew  Residential Liaison  [phone]

## 2024-07-17 NOTE — PROGRESS NOTES
Progress Note  Margaret Silverio Patient Status:  Inpatient    3/14/1945 MRN [id number]   Location [facility] 3W/SW Attending Regan Cruz MD   Hosp Day # 2 PCP Johnathon Rodriguez,      Subjective:  No events overnight still feels short of breath.  No palpitations no chest pain.    Objective:  Physical Exam:   /57 (BP Location: Radial)   Pulse 62   Temp 98.3 °F (36.8 °C)   Resp 18   Wt 117 lb 14.4 oz (53.5 kg)   SpO2 94%   BMI 20.24 kg/m²   Temp (24hrs), Av °F (36.7 °C), Min:97.8 °F (36.6 °C), Max:98.3 °F (36.8 °C)       Intake/Output Summary (Last 24 hours) at 2024 1612  Last data filed at 2024 0600  Gross per 24 hour   Intake 350 ml   Output 600 ml   Net -250 ml     Wt Readings from Last 3 Encounters:   24 117 lb 14.4 oz (53.5 kg)   24 124 lb 12.8 oz (56.6 kg)   24 136 lb (61.7 kg)     Telemetry: A-V paced  General: Chronically ill and thin appearing female who is alert and oriented in no apparent distress on 5 L by nasal cannula  HEENT: No focal deficits.  Neck: No JVD, carotids 2+ no bruits.  Cardiac: Regular rate and rhythm, S1, S2 normal, no murmur, rub or gallop.  Lungs: Diminished L side. Otherwise without wheezes, rales, rhonchi or dullness.  Normal excursions and effort on 5L by nasal cannula  Abdomen: Soft, distended, moderate diffuse tenderness without rebound tenderness or guarding  Extremities: Significant ulnar deviation of the wrists. Without clubbing, cyanosis or edema.  Peripheral pulses are 2+.  Neurologic: Alert and oriented, normal affect.  Skin: Warm and dry.        Intake/Output:    Intake/Output Summary (Last 24 hours) at 2024 1612  Last data filed at 2024 0600  Gross per 24 hour   Intake 350 ml   Output 600 ml   Net -250 ml       Last 3 Weights   24 0508 117 lb 14.4 oz (53.5 kg)   24 0614 123 lb 12.8 oz (56.2 kg)   07/15/24 1830 124 lb 1.6 oz (56.3 kg)   24 1505 124 lb 12.8 oz (56.6 kg)   24  1501 136 lb (61.7 kg)       Labs:  Recent Labs   Lab 07/15/24  1455 07/15/24  1634 07/16/24  0701 07/17/24  0646   GLU 96  --  78 79   BUN  --  51* 46* 44*   CREATSERUM 2.27*  --  1.96* 1.89*   EGFRCR 22*  --  26* 27*   CA 9.2  --  8.9 8.8     --  142 141   K  --  5.3* 4.2 4.1     --  106 105   CO2 27.0  --  28.0 29.0     Recent Labs   Lab 07/15/24  1455 07/16/24  0701 07/17/24  0646   RBC 2.97* 3.01* 2.82*   HGB 9.6* 9.6* 9.2*   HCT 30.0* 30.4* 28.4*   .0* 101.0* 100.7*   MCH 32.3 31.9 32.6   MCHC 32.0 31.6 32.4   RDW 20.0* 19.3* 19.2*   NEPRELIM 0.86* 0.76*  --    WBC 1.5* 1.8* 2.7*   .0 209.0 181.0         No results for input(s): \"TROP\", \"TROPHS\", \"CK\" in the last 168 hours.    Diagnostics:   ECHOCARDIOGRAM 6/20/2024:  1. Left ventricle: The cavity size was normal. Wall thickness was mildly      increased. Systolic function was markedly reduced. The estimated ejection      fraction was 30-35%, by biplane method of disks. Dyskinesis of the      anteroseptal and anterior walls. Unable to assess LV diastolic function      due to valvular repair/replacement.   2. Right ventricle: The cavity size was increased. Pacer wire noted in the      right ventricle.   3. Left atrium: The atrium was markedly dilated.   4. Right atrium: The atrium was dilated.   5. Atrial septum: There was no atrial level shunt.   6. Aortic valve: The findings were consistent with very mild stenosis. The      peak systolic velocity was 1.71m/sec. The mean systolic gradient was 6mm      Hg. The valve area (VTI) was 1.71cm^2.   7. Mitral valve: A bioprosthesis is present. There was no significant      regurgitation. The mean diastolic gradient was 6mm Hg.   8. Tricuspid valve: There was wide-open regurgitation.   Impressions:  This study is compared with previous dated 2/10/2024:           Medications:   midodrine  5 mg Oral BID AC    heparin  5,000 Units Subcutaneous 2 times per day    amiodarone  200 mg Oral Daily     [Held by provider] carvedilol  3.125 mg Oral BID with meals    folic acid  800 mcg Oral Daily    gabapentin  300 mg Oral TID    isosorbide dinitrate  20 mg Oral TID (Nitrates)    pantoprazole  40 mg Oral BID AC      bumetanide (Bumex) 12.5 mg in 50 mL infusion 1 mg/hr (07/17/24 1558)    DOBUTamine 5 mcg/kg/min (07/16/24 1620)       Assessment/Plan:  Nonischemic Severe Biventricular failure presenting with weakness and dyspnea  L Left pleural effusion noted status postthoracentesis 7/16 1 L removed.  Dobutamine and dose of IV Bumex yesterday   Right heart catheterization performed patient refused to have  [place] through the right IJ only agreed to the groin without leave in.  Right heart catheterization showed severe volume overload continue dobutamine drip and resume Bumex at 1 mg an hour.  Normal coronaries [facility] 2022  L sided pleural effusion  Status postthoracentesis.  Denies chronic oxygen use at home now on 5L  Hx of paroxysmal atrial flutter/fib  AV paced  Hx of L atrial appendage occlusion  Remains on daily amiodarone  Hx of surgical mitral valve replacement  Hx of severe GI bleeding this year  Management as per primary  Rheumatoid arthritis  On methotrexate  Chronic changes noted  Leukopenia  On methotrexate for RA  Defer    PLAN  Continue amiodarone, continue dobutamine drip and Bumex drip.  Palliative care consult appreciated Durrax.  Possible tricuspid clip in the future with severe TR.      L3, will follow    Tyler Vaz MD

## 2024-07-17 NOTE — PLAN OF CARE
Plan to start Bumex drip but got only 1 IV access. Attempted an IV twice but unsuccessful.  TL Porsha tried to do an ultrasound guided IV for hours without success.  Bumex gtt is incompatible with dobutamine gtt. [facility] was notified. Placed and order for Vascular access consult.

## 2024-07-17 NOTE — PHYSICAL THERAPY NOTE
PHYSICAL THERAPY EVALUATION - INPATIENT     Room Number: 311/311-A  Evaluation Date: 7/17/2024  Type of Evaluation: Initial   Physician Order: PT Eval and Treat    Presenting Problem: acute on chronic CHF  Co-Morbidities : RA in hands  Reason for Therapy: Mobility Dysfunction and Discharge Planning    PHYSICAL THERAPY ASSESSMENT   Patient is a 79 year old female admitted 7/15/2024 for acute on chronic CHF.  Prior to admission, patient's baseline is ambulatory with RW at home, IADL and ADL help from family due to RA in hands.  Patient is currently functioning below baseline with bed mobility, transfers, and gait.  Patient is requiring minimal assist as a result of the following impairments: pain and medical status.  Physical Therapy will continue to follow for duration of hospitalization.    Patient will benefit from continued skilled PT Services at discharge to promote prior level of function and safety with additional support and return home with home health PT. Limited eval this date due to neck pain but anticipated to progress well this admission.     PLAN  PT Treatment Plan: Bed mobility;Body mechanics;Endurance;Energy conservation;Family education;Gait training;Strengthening;Range of motion;Transfer training;Balance training  Rehab Potential : Good  Frequency (Obs): 5x/week    PHYSICAL THERAPY MEDICAL/SOCIAL HISTORY   History related to current admission: acute on chronic CHF     Problem List  Principal Problem:    Acute on chronic congestive heart failure, unspecified heart failure type (HCC)  Active Problems:    Goals of care, counseling/discussion    Advance care planning    Palliative care by specialist    Acute on chronic systolic (congestive) heart failure (HCC)      HOME SITUATION  Home Situation  Type of Home: House  Home Layout: One level  Lives With: Spouse (graddaughter)     Prior Level of [place]: independent with RW    SUBJECTIVE  \"My neck is killing me.\"     PHYSICAL THERAPY EXAMINATION    OBJECTIVE  Precautions: Bed/chair alarm  Fall Risk: High fall risk    WEIGHT BEARING RESTRICTION  Weight Bearing Restriction: None                PAIN ASSESSMENT     Location: neck pain unrated       COGNITION  Overall Cognitive Status:  WFL - within functional limits      BALANCE  Static Sitting: Fair +  Dynamic Sitting: Fair +        ACTIVITY TOLERANCE           BP: 129/61  BP Location: Left arm  BP Method: Automatic  Patient Position: Semi-Awad    O2 WALK  Oxygen Therapy  SPO2% on Oxygen at Rest: 93  At rest oxygen flow (liters per minute): 2  Ambulation oxygen flow (liters per minute): 2    AM-PAC '6-Clicks' INPATIENT SHORT FORM - BASIC MOBILITY  How much difficulty does the patient currently have...  Patient Difficulty: Turning over in bed (including adjusting bedclothes, sheets and blankets)?: A Little   Patient Difficulty: Sitting down on and standing up from a chair with arms (e.g., wheelchair, bedside commode, etc.): A Lot   Patient Difficulty: Moving from lying on back to sitting on the side of the bed?: A Little   How much help from another person does the patient currently need...   Help from Another: Moving to and from a bed to a chair (including a wheelchair)?: A Lot   Help from Another: Need to walk in hospital room?: A Lot   Help from Another: Climbing 3-5 steps with a railing?: A Lot     AM-PAC Score:  Raw Score: 14   Approx Degree of Impairment: 61.29%   Standardized Score (AM-PAC Scale): 38.1   CMS Modifier (G-Code): CL    FUNCTIONAL ABILITY STATUS  Functional Mobility/Gait Assessment  Gait Assistance:  (EOB only this date, pt fatigued and with neck pain)  Rolling: minimal assist  Supine to Sit: minimal assist  Sit to Supine: minimal assist  Sit to Stand:  NT    Exercise/Education Provided:  Bed mobility  Body mechanics  Functional activity tolerated    The patient's Approx Degree of Impairment: 61.29% has been calculated based on documentation in the [facility] '6 clicks' Inpatient Basic Mobility  Short Form.  Research supports that patients with this level of impairment may benefit from AR/CÉSAR, however limited assessment this date due to pt neck pain, anticipate pt will progress well.  Final disposition will be made by interdisciplinary medical team.    Patient End of Session: In bed;Needs met;Call light within reach;RN aware of session/findings;All patient questions and concerns addressed;With  staff    CURRENT GOALS  Goals to be met by: 8/10  Patient Goal Patient's self-stated goal is: unstated    Goal #1 Patient is able to demonstrate supine - sit EOB @ level: supervision     Goal #1   Current Status    Goal #2 Patient is able to demonstrate transfers Sit to/from Stand at assistance level: supervision with walker - rolling     Goal #2  Current Status    Goal #3 Patient is able to ambulate 50 feet with assist device: walker - rolling at assistance level: supervision   Goal #3   Current Status    Goal #4 Patient will negotiate 2 stairs/one curb w/ assistive device and supervision   Goal #4   Current Status    Goal #5 Patient to demonstrate independence with home activity/exercise instructions provided to patient in preparation for discharge.   Goal #5   Current Status    Goal #6    Goal #6  Current Status      Patient Evaluation Complexity Level:  History Moderate - 1 or 2 personal factors and/or co-morbidities   Examination of body systems Moderate - addressing a total of 3 or more elements   Clinical Presentation  Moderate - Evolving   Clinical Decision Making  Moderate Complexity     Therapeutic Activity:  9 minutes

## 2024-07-17 NOTE — PROGRESS NOTES
[facility]  part of [facility]    Progress Note    Margaret Silverio Patient Status:  Inpatient    3/14/1945 MRN [id number]   Location [facility] 3W/SW Attending Regan Cruz MD   Hosp Day # 2 PCP Johnathon Rodriguez DO       Subjective:     Constitutional:  Positive for fatigue. Negative for fever.     Breathing better after thoracentesis  Down to 2 L of oxygen  No chest pain or cough or sputum  Generalized fatigue  Posterior neck pain and intermittent headache  Objective:   Blood pressure 141/65, pulse 60, temperature 97.8 °F (36.6 °C), temperature source Oral, resp. rate 18, weight 117 lb 14.4 oz (53.5 kg), SpO2 97%.  Physical Exam  Constitutional:       General: She is not in acute distress.     Appearance: She is ill-appearing.   HENT:      Head: Atraumatic.   Eyes:      General: No scleral icterus.  Cardiovascular:      Rate and Rhythm: Normal rate.      Heart sounds:      No gallop.   Pulmonary:      Comments: Diminished in the bases otherwise clear with better air exchange to the left lung  Abdominal:      General: Abdomen is flat. Bowel sounds are normal.      Palpations: Abdomen is soft.      Tenderness: There is no guarding.   Musculoskeletal:      Right lower leg: No edema.      Left lower leg: No edema.   Lymphadenopathy:      Cervical: No cervical adenopathy.   Skin:     General: Skin is dry.   Neurological:      Mental Status: She is oriented to person, place, and time. Mental status is at baseline.         Results:   Lab Results   Component Value Date    WBC 2.7 (L) 2024    HGB 9.2 (L) 2024    HCT 28.4 (L) 2024    .0 2024    CREATSERUM 1.89 (H) 2024    BUN 44 (H) 2024     2024    K 4.1 2024     2024    CO2 29.0 2024    GLU 79 2024    CA 8.8 2024    ALB 4.0 2024    ALKPHO 328 (H) 2024    BILT 0.8 2024    TP 7.7 2024    AST 23 2024    ALT 11  06/18/2024    PTT 35.2 02/09/2024    INR 1.25 (H) 02/09/2024    T4F 1.4 06/18/2024    TSH 7.534 (H) 06/18/2024    LIP 32 01/13/2024    ESRML 61 (H) 04/23/2024    CRP 5.10 (H) 04/23/2024    MG 2.2 07/16/2024    PHOS 3.8 07/01/2024    TROPHS 24 05/24/2024    B12 >2,000 (H) 07/01/2024       US THORACENTESIS GUIDED LEFT (CPT=32555)    Result Date: 7/16/2024  CONCLUSION: Ultrasound imaging was performed for left thoracentesis.   Dictated by (CST): Maxx Enriquez MD on 7/16/2024 at 4:38 PM     Finalized by (CST): Maxx Enriquez MD on 7/16/2024 at 4:38 PM          XR CHEST AP PORTABLE  (CPT=71045)    Result Date: 7/16/2024  CONCLUSION:  1.  Post left thoracentesis.  Moderate-sized left pleural effusion which is otherwise smaller since prior exam.  Improved aeration of the left lung.  No pneumothorax. 2.  28 mm diameter nodular opacity in the right lung base likely representing atelectatic change however it is more discrete on this exam and recommend attention to this finding at time of follow-up imaging to exclude other pneumonitis or mass.   Dictated by (CST): Maxx Enriquez MD on 7/16/2024 at 4:31 PM     Finalized by (CST): Maxx Enriquez MD on 7/16/2024 at 4:34 PM          XR CHEST AP PORTABLE  (CPT=71045)    Result Date: 7/15/2024  CONCLUSION:   Enlarging moderate to large left pleural effusion with associated increase in the opacities in the left perihilar region and left lower lobe, which are favored to reflect atelectasis with or without superimposed pneumonia.  Progressive pulmonary edema.  No significant change in the right pleural effusion with associated right perihilar and right lower lobe opacities.  Lesser incidental findings described above.    Dictated by (CST): Anshul Joseph MD on 7/15/2024 at 3:39 PM     Finalized by (CST): Anshul Joseph MD on 7/15/2024 at 3:42 PM         EKG    Result Date: 7/15/2024  AV dual-paced rhythm Abnormal ECG When compared with ECG of 18-JUN-2024 12:36, No significant change was  found Confirmed by RAAD LY PRATIK ([id number]) on 7/15/2024 3:53:40 PM     Assessment & Plan:       1-acute respiratory failure with hypoxia  Severe cardiomyopathy HFrEF and  large pleural effusion L>>R     Better today after thoracentesis / on 2 L ND / continue to wean      2- pleural effusion L>>R   S/p thoracentesis 7/16 with 1 L   Exudative /lymphocytic and mono predominant with negative culture and pending cytology  ? Chronic CHF on diuretics   ? 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

## 2024-07-17 NOTE — PROGRESS NOTES
Notified by Breanna GIL pt needs additional IV access for Bumex infusion. Dobutamine infusing in current IV. RN reported unsuccessful attempts to initiate IV access even with ultrasound guidance. Consult ordered for vascular to place a midline.

## 2024-07-17 NOTE — PROCEDURES
Procedures performed:  Right heart catheterization  Initial plan was to perform via right IJ approach to leave in [place] patient refused to have a knee intervention through her neck then switched to the groin approach.    Right heart catheterization was performed which showed:  RV 38/19  Mean RA 22 mmHg  Wedge 16 mmHg  PA 47/21.    AO saturation 100%, PA saturation 56.8  Cardiac output 3.5 L/min, cardiac index 2.2.    Unable to advance CCO [place] into the PA.  Right heart catheterization was performed without leaving S1.    Complications none    Summary fluid overload with evidence of congestion.  Right-sided ventricular failure.  Continue Bumex drip at 1 mg an hour.

## 2024-07-18 LAB
ANION GAP SERPL CALC-SCNC: 6 MMOL/L (ref 0–18)
ATRIAL RATE: 71 BPM
BUN BLD-MCNC: 40 MG/DL (ref 9–23)
BUN/CREAT SERPL: 22.2 (ref 10–20)
CALCIUM BLD-MCNC: 9.6 MG/DL (ref 8.7–10.4)
CHLORIDE SERPL-SCNC: 101 MMOL/L (ref 98–112)
CO2 SERPL-SCNC: 31 MMOL/L (ref 21–32)
CREAT BLD-MCNC: 1.8 MG/DL
DEPRECATED RDW RBC AUTO: 66 FL (ref 35.1–46.3)
EGFRCR SERPLBLD CKD-EPI 2021: 28 ML/MIN/1.73M2 (ref 60–?)
ERYTHROCYTE [DISTWIDTH] IN BLOOD BY AUTOMATED COUNT: 19.3 % (ref 11–15)
GLUCOSE BLD-MCNC: 106 MG/DL (ref 70–99)
HCT VFR BLD AUTO: 31.6 %
HGB BLD-MCNC: 10 G/DL
MAGNESIUM SERPL-MCNC: 1.9 MG/DL (ref 1.6–2.6)
MCH RBC QN AUTO: 31.9 PG (ref 26–34)
MCHC RBC AUTO-ENTMCNC: 31.6 G/DL (ref 31–37)
MCV RBC AUTO: 101 FL
OSMOLALITY SERPL CALC.SUM OF ELEC: 296 MOSM/KG (ref 275–295)
P AXIS: 35 DEGREES
P-R INTERVAL: 132 MS
PLATELET # BLD AUTO: 189 10(3)UL (ref 150–450)
POTASSIUM SERPL-SCNC: 5 MMOL/L (ref 3.5–5.1)
Q-T INTERVAL: 516 MS
QRS DURATION: 214 MS
QTC CALCULATION (BEZET): 560 MS
R AXIS: -79 DEGREES
RBC # BLD AUTO: 3.13 X10(6)UL
SODIUM SERPL-SCNC: 138 MMOL/L (ref 136–145)
T AXIS: 102 DEGREES
TROPONIN I SERPL HS-MCNC: 14 NG/L
VENTRICULAR RATE: 71 BPM
WBC # BLD AUTO: 4.5 X10(3) UL (ref 4–11)

## 2024-07-18 PROCEDURE — 99232 SBSQ HOSP IP/OBS MODERATE 35: CPT | Performed by: REGISTERED NURSE

## 2024-07-18 PROCEDURE — 99291 CRITICAL CARE FIRST HOUR: CPT | Performed by: HOSPITALIST

## 2024-07-18 PROCEDURE — 99232 SBSQ HOSP IP/OBS MODERATE 35: CPT | Performed by: INTERNAL MEDICINE

## 2024-07-18 RX ORDER — MORPHINE SULFATE 15 MG/1
15 TABLET, FILM COATED, EXTENDED RELEASE ORAL EVERY 12 HOURS SCHEDULED
Status: DISCONTINUED | OUTPATIENT
Start: 2024-07-18 | End: 2024-07-20

## 2024-07-18 RX ORDER — MORPHINE SULFATE 2 MG/ML
2 INJECTION, SOLUTION INTRAMUSCULAR; INTRAVENOUS EVERY 2 HOUR PRN
Status: DISCONTINUED | OUTPATIENT
Start: 2024-07-18 | End: 2024-07-19

## 2024-07-18 RX ORDER — SENNA AND DOCUSATE SODIUM 50; 8.6 MG/1; MG/1
2 TABLET, FILM COATED ORAL DAILY
Status: DISCONTINUED | OUTPATIENT
Start: 2024-07-18 | End: 2024-07-22

## 2024-07-18 NOTE — PLAN OF CARE

## 2024-07-18 NOTE — PROGRESS NOTES
[facility]  part of [facility]    Progress Note    Margaret Silverio Patient Status:  Inpatient    3/14/1945 MRN [id number]   Location [facility] 3W/SW Attending Regan Cruz MD   Hosp Day # 2 PCP Johnathon Rodriguez DO     Chief Complaint:   Chief Complaint   Patient presents with    Shortness Of Breath   SOB x 1 week. Was started on o2  with no improvement.     Subjective:   Margaret Silverio is back from cardiac cath - feels much better. No further HA with pain regimen she is smiling. SOB improving. Family at bedside. No CP     Objective:   Objective:    Blood pressure 107/52, pulse 67, temperature 98.1 °F (36.7 °C), temperature source Oral, resp. rate 18, weight 117 lb 14.4 oz (53.5 kg), SpO2 95%.    Physical Exam:    General: No acute distress.   Respiratory: Diminished L side. Crackles R side.   Cardiovascular: S1, S2. Regular rate and rhythm. No murmurs, rubs or gallops.   Abdomen: Soft, nontender, nondistended.  Positive bowel sounds. No rebound or guarding.  Neurologic: No focal neurological deficits.   Musculoskeletal: Moves all extremities.  Extremities: No edema.      Results:   Results:    Labs:  Recent Labs   Lab 07/15/24  1455 24  0701 24  0646   WBC 1.5* 1.8* 2.7*   HGB 9.6* 9.6* 9.2*   .0* 101.0* 100.7*   .0 209.0 181.0       Recent Labs   Lab 07/15/24  1455 07/15/24  1634 24  0701 24  0646   GLU 96  --  78 79   BUN  --  51* 46* 44*   CREATSERUM 2.27*  --  1.96* 1.89*   CA 9.2  --  8.9 8.8     --  142 141   K  --  5.3* 4.2 4.1     --  106 105   CO2 27.0  --  28.0 29.0       Estimated Creatinine Clearance: 20.7 mL/min (A) (based on SCr of 1.89 mg/dL (H)).    No results for input(s): \"PTP\", \"INR\" in the last 168 hours.         Culture:  Hospital Encounter on 07/15/24   1. Body Fluid Cult Aerobic and Anaerobic     Status: None (Preliminary result)    Collection Time: 24  4:00 PM    Specimen: Pleural  Fluid, Left; Body fluid, unspecified   Result Value Ref Range    Body Fluid Culture Result No Growth 1 Day N/A    Body Fluid Smear No WBCs seen N/A    Body Fluid Smear No organisms seen N/A    Body Fluid Smear This is a cytocentrifuged smear. N/A       Cardiac  No results for input(s): \"TROP\", \"PBNP\" in the last 168 hours.      Imaging: Imaging data reviewed in [facility].  US THORACENTESIS GUIDED LEFT (CPT=32555)    Result Date: 7/16/2024  CONCLUSION: Ultrasound imaging was performed for left thoracentesis.   Dictated by (CST): Maxx Enriquez MD on 7/16/2024 at 4:38 PM     Finalized by (CST): Maxx Enriquez MD on 7/16/2024 at 4:38 PM          XR CHEST AP PORTABLE  (CPT=71045)    Result Date: 7/16/2024  CONCLUSION:  1.  Post left thoracentesis.  Moderate-sized left pleural effusion which is otherwise smaller since prior exam.  Improved aeration of the left lung.  No pneumothorax. 2.  28 mm diameter nodular opacity in the right lung base likely representing atelectatic change however it is more discrete on this exam and recommend attention to this finding at time of follow-up imaging to exclude other pneumonitis or mass.   Dictated by (CST): Maxx Enriquez MD on 7/16/2024 at 4:31 PM     Finalized by (CST): Maxx Enriquez MD on 7/16/2024 at 4:34 PM           Medications:    midodrine  5 mg Oral BID AC    heparin  5,000 Units Subcutaneous 2 times per day    amiodarone  200 mg Oral Daily    [Held by provider] carvedilol  3.125 mg Oral BID with meals    folic acid  800 mcg Oral Daily    gabapentin  300 mg Oral TID    isosorbide dinitrate  20 mg Oral TID (Nitrates)    pantoprazole  40 mg Oral BID AC         Assessment and Plan:   Assessment & Plan:      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   Dobutamine gtt   Bumex 1mg/hr gtt.   S/p thoracentesis with 1L transudative fluid removed 7/16    GDMT: Hold home farxiga, aldactone, coreg, entresto due to hypotension.   [facility] 2022 - normal  coronaries.   S/p RHC 7/17 showed severe volume overload   Cards on consult  I/O, daily weights.   ECHO LVEF 30-35% dyskinesis of anterior walls. RV size increased  Large L pleural effusion   Hypoxia   S/p thoracentesis 1L taken off. transudate  Pulm on consult.   Was recently placed on o2 at home on 7/9 2L continuous prior to this not on home o2  Has h/o RA. Check ESR/CRP if elevatated will consider prednisone.   H/o paroxysmal A.fib   AV paced.   H/o BRANDYN occlusion   Not on A/C due to h/o GI bleed.   Amiodarone   Leukopenia   WBC 1.5   Hold methotrexate  Rpt labs in AM.   GEORGE on CKD IV   Baseline creat 1.4 -1.6 per EMR.   BUN/creat trending down with diuresis.   Rpt labs in AM.   Hypotension   Midodrine 2.5mg BID  Hold isordil for sBP < 100   Acute on chronic neck pain with radiculopathy  Multilevel spondylosis C3-5  Was seen by neurosurgery not a surgical candidate.   Pain control.   Palliative care consult   Cont gabapentin 300mg TID, cont norco PRN  Recently has GIUSEPPE outpt   Other medical problems  Severe RA with multiple joint deformities       >55min spent, >50% spent counseling and coordinating care in the form of educating pt/family and d/w consultants and staff. Most of the time spent discussing the above plan.        Plan of care discussed with patient or family at bedside.    Rgean Cruz MD  Hospitalist          Supplementary Documentation:     Quality:  DVT Prophylaxis: SCD  CODE status: DNR   Dispo: per clinical course           Estimated date of discharge: TBD  Discharge is dependent on: clinical stability  At this point Ms. Silverio is expected to be discharge to: home

## 2024-07-18 NOTE — PALLIATIVE CARE NOTE
[facility]  part of [facility]  Palliative Care Progress Note    Margaret Silverio Patient Status:  Inpatient    3/14/1945 MRN [id number]   Location [facility] 3W/SW Attending Katiana Ochoa MD   Hosp Day # 3 PCP Johnathon Rodriguez,      The  [employer] Cures Act makes medical notes like these available to patients in the interest of transparency. Please be advised this is a medical document. Medical documents are intended to carry relevant information, facts as evident, and the clinical opinion of the practitioner. The medical note is intended as peer to peer communication and may appear blunt or direct. It is written in medical language and may contain abbreviations or verbiage that are unfamiliar.       Subjective     Reviewed symptom medications past 24 hrs: Norco 10/325 mg po X4, Morphine 2 mg IVP X2    Reviewed notes-pt underwent R heart cath -+FVO/R sided ventricular failure    Patient was seen and examined in bed with RN at the bedside. Pt is A&OX4 and is crying d/t severe 10/10 L sided neck pain this morning. She says she was also having chest pressure through the night, but her pain is improving now after getting Morphine IVP this morning. She talked with me about her chronic pain issues involving her her L neck/RA which have been ongoing for > 1 year and has been on taking Norco 10/325 mg at least 4 tabs a day at home which hasn't been controlling pain. She tells me she has been also following with PMR Dr. eLe and had recent cervical block which didn't help. She is asking me to help her get her pain under better control. I talked with pt about pain recommendations with trial of adding MS Contin to see if pain symptoms can improve. She denies dyspnea symptoms at rest currently on nc 2L, but tells me she has RAM with any activity. Appetite is poor, denies abdominal pain, n/v and no BM in 3 days. Discussed need for bowel regimen while on opioid tx. Remains  on bumex and dobutamine gtts.      See summary of discussion below.     Review of Systems:  Pertinent items are noted in subjective    Allergies:  Allergies   Allergen Reactions    Lisinopril Coughing       Medications:     Current Facility-Administered Medications:     morphINE ER (MS Contin) tab 15 mg, 15 mg, Oral, 2 times per day    senna-docusate (Senokot-S) 8.6-50 MG per tab 2 tablet, 2 tablet, Oral, Daily    polyethylene glycol (PEG 3350) (Miralax) 17 g oral packet 17 g, 17 g, Oral, Daily PRN    bisacodyl (Dulcolax) 10 MG rectal suppository 10 mg, 10 mg, Rectal, Daily PRN    HYDROcodone-acetaminophen (Norco)  MG per tab 1 tablet, 1 tablet, Oral, Q4H PRN    morphINE PF 2 MG/ML injection 2 mg, 2 mg, Intravenous, Q4H PRN    senna-docusate (Senokot-S) 8.6-50 MG per tab 2 tablet, 2 tablet, Oral, Daily PRN    midodrine (ProAmatine) tab 5 mg, 5 mg, Oral, BID AC    bumetanide (Bumex) 12.5 mg in 50 mL infusion, 1 mg/hr, Intravenous, Continuous    cyclobenzaprine (Flexeril) tab 5 mg, 5 mg, Oral, TID PRN    heparin (Porcine) 5000 UNIT/ML injection 5,000 Units, 5,000 Units, Subcutaneous, 2 times per day    acetaminophen (Tylenol Extra Strength) tab 500 mg, 500 mg, Oral, Q4H PRN    ondansetron (Zofran) 4 MG/2ML injection 4 mg, 4 mg, Intravenous, Q6H PRN    metoclopramide (Reglan) 5 mg/mL injection 5 mg, 5 mg, Intravenous, Q8H PRN    DOBUTamine in dextrose 5% (Dobutrex) 500 mg/250mL infusion premix, 5 mcg/kg/min, Intravenous, Continuous    amiodarone (Pacerone) tab 200 mg, 200 mg, Oral, Daily    [Held by provider] carvedilol (Coreg) tab 3.125 mg, 3.125 mg, Oral, BID with meals    folic acid (Folvite) tab 800 mcg, 800 mcg, Oral, Daily    gabapentin (Neurontin) cap 300 mg, 300 mg, Oral, TID    isosorbide dinitrate (Isordil) tab 20 mg, 20 mg, Oral, TID (Nitrates)    pantoprazole (Protonix) DR tab 40 mg, 40 mg, Oral, BID AC    lidocaine-menthol 4-1 % patch 1 patch, 1 patch, Transdermal, Daily PRN    Objective     Vital  Signs:  Blood pressure 114/52, pulse 71, temperature 98.5 °F (36.9 °C), temperature source Oral, resp. rate 22, weight 113 lb 9.6 oz (51.5 kg), SpO2 96%.  Body mass index is 19.5 kg/m².  Present Level of pain: 10/10 L neck  Non-verbal signs of pain present: No    Physical Exam:  General: Alert and in no apparent distress. Weak, frail, thin appearing  HEENT: No focal deficits.  Cardiac: Regular rate and rhythm, S1, S2 normal, no murmur, rub or gallop.  Lungs: Crackles bilateral bases.  Normal excursions and effort.  Abdomen: Soft, non-tender, normal bowel sounds X 4 quadrants, no rebound or guarding  Extremities: Without clubbing, cyanosis. Peripheral pulses are 2+. BLE Edema not present  Neurologic: Alert and oriented X4, normal affect.  Skin: Warm and dry.    Prior to admission Palliative performance scale PPSv2 (%): 50    Current PPS 40%    Hematology:  Lab Results   Component Value Date    WBC 2.7 (L) 07/17/2024    HGB 9.2 (L) 07/17/2024    HCT 28.4 (L) 07/17/2024    .0 07/17/2024       Coags:  Lab Results   Component Value Date    INR 1.25 (H) 02/09/2024    PTT 35.2 02/09/2024       Chemistry:  Lab Results   Component Value Date    CREATSERUM 1.89 (H) 07/17/2024    BUN 44 (H) 07/17/2024     07/17/2024    K 4.1 07/17/2024     07/17/2024    CO2 29.0 07/17/2024    GLU 79 07/17/2024    CA 8.8 07/17/2024    ALB 4.0 07/01/2024    ALKPHO 328 (H) 06/18/2024    BILT 0.8 06/18/2024    TP 7.7 06/18/2024    AST 23 06/18/2024    ALT 11 06/18/2024    MG 2.2 07/16/2024    PHOS 3.8 07/01/2024       Imaging:  US THORACENTESIS GUIDED LEFT (CPT=32555)    Result Date: 7/16/2024  CONCLUSION: Ultrasound imaging was performed for left thoracentesis.   Dictated by (CST): Maxx Enriquez MD on 7/16/2024 at 4:38 PM     Finalized by (CST): Maxx Enriquez MD on 7/16/2024 at 4:38 PM          XR CHEST AP PORTABLE  (CPT=71045)    Result Date: 7/16/2024  CONCLUSION:  1.  Post left thoracentesis.  Moderate-sized left pleural  effusion which is otherwise smaller since prior exam.  Improved aeration of the left lung.  No pneumothorax. 2.  28 mm diameter nodular opacity in the right lung base likely representing atelectatic change however it is more discrete on this exam and recommend attention to this finding at time of follow-up imaging to exclude other pneumonitis or mass.   Dictated by (CST): Maxx Enriquez MD on 7/16/2024 at 4:31 PM     Finalized by (CST): Maxx Enriquez MD on 7/16/2024 at 4:34 PM          XR CHEST AP PORTABLE  (CPT=71045)    Result Date: 7/15/2024  CONCLUSION:   Enlarging moderate to large left pleural effusion with associated increase in the opacities in the left perihilar region and left lower lobe, which are favored to reflect atelectasis with or without superimposed pneumonia.  Progressive pulmonary edema.  No significant change in the right pleural effusion with associated right perihilar and right lower lobe opacities.  Lesser incidental findings described above.    Dictated by (CST): Anshul Joseph MD on 7/15/2024 at 3:39 PM     Finalized by (CST): Anshul Joseph MD on 7/15/2024 at 3:42 PM           Follow up GOC Discussion      7/18/24-Provided clinical update to pt and also talked with her dtr Barbi by phone. We talked about her ongoing pain issues and pt is asking for help with getting her pain under better control. I discussed trial of long acting opioid tx to improve pain. Education provided on opioid tx and MOA/SE's which she and her dtr are in agreement with trying. I discussed she will need continued palliative care follow up on dc and they are agreeable to following with Angy URBANO in outpatient PC clinic as they were already referred to her from CHF clinic. Pt wants to continue with supportive care and is not ready for hospice. Provided emotional support to pt/dtr who are coping adequately.     Discussed with Patient and Family: Yes  Patient's preference about sharing medical information:  speak to pt and her dtr  Patient's decision making preferences: speak to pt  Code status: DNAR/DNI-selective  Have advanced directives been discussed with patient or healthcare power of : Yes        Healthcare Agent Appointed: Yes  Healthcare Agent's Name: Barbi Silverio  Healthcare Agent's Phone Number: [phone]          Spiritual needs addressed: Patient/family declined Spiritual Care    Palliative disposition: Outpatient Palliative Care      Procedures:  No intubation      Palliative Care Assessment and Recommendations     Problem List:     Acute on chronic severe non-ischemic biventricular CHF   Severe TVR  L pleural effusion s/p thora on 7/16 1L removed  GEORGE on CKD  Hypoxia  Hypotension on midodrine  H/o Bioprosthetic mitral valve  H/o L subclavian DVT  Pacemaker/AICD  Pancytopenia  Pafib  H/o GIB  Rheumatoid arthritis  Weakness     Chronic neck pain r/t multilevel cervical DDD/RA  -Uncontrolled, previously followed with PMR Dr. Lee and had recent cervical block which did not help  -Add trial MS Contin 15 mg po Q 12 hrs. Discussed MOA/SE's and pt/dtr agreeable.  -Continue Tylenol 500 mg po Q 4 hrs prn  -Continue home Norco 10/325 mg 1 tab po Q 4 hrs prn   -Continue Gabapentin 300 mg po TID  -Continue Flexeril 5 mg po TID prn  -Change Morphine 2 mg IVP Q 2 hrs prn severe BTP/dyspnea  -Consider short term prednisone with elevated CRP-discussed with Dr. Mayorga.  -If pain symptoms remain uncontrolled, consider switching Norco to MS IR 15 mg or Dilaudid 2 mg po prn.   -Heating pad prn  -Will continue to monitor pain symptoms closely.      Constipation  -Add Senna S 2 tabs po daily, continue Miralax daily prn, Senna S 2 tabs daily prn, ducolax supp prn while on opioid tx  -Discussed need for bowel regimen for prevention of OIC     Goals of care counseling  -see above for details  -Confirmed wishes for DNAR/DNI-selective and continue supportive care.  -Ongoing GOC discussions will be needed over  time.  -Pt/dtr are aware of the option for comfort care with hospice, NOT ready for this right now.  -Agreeable to palliative care following  -Dispo:  Pt prefers to return home on dc. Recommended follow up with Angy URBANO in outpatient palliative care clinic on dc for ongoing pain management and GOC.  SW to help with dc planning.   -Provided emotional support to pt/family who are coping adequately.     Advance care planning  -Pt's dtr Barbi Silverio is primary HCPOA #[phone].  -Previously completed POLST/ HCPOA paperwork in [employer].     Palliative Performance Scale 40%      Emotion support provided to patient/family today: Yes    A total of 40 mins were spent on this consult, which included all of the following: direct face to face contact, history taking, physical examination, and >50% was spent counseling and coordinating care.    Discussed today's visit with Dr. Mayorga, Dr. Cardenas, Angy URBANO and Amelie GIL .    I am out of the office tomorrow, but will have one of my partners follow up in my absence.     Aparna Mahan, ANP-BC, [facility] [id number]  7/18/2024  9:32 AM  Palliative Care Services

## 2024-07-18 NOTE — PHYSICAL THERAPY NOTE
PHYSICAL THERAPY TREATMENT NOTE - INPATIENT     Room Number: 311/311-A       Presenting Problem: acute on chronic CHF  Co-Morbidities : RA in hands    Problem List  Principal Problem:    Acute on chronic congestive heart failure, unspecified heart failure type (HCC)  Active Problems:    Goals of care, counseling/discussion    Advance care planning    Palliative care by specialist    Acute on chronic systolic (congestive) heart failure (HCC)      PHYSICAL THERAPY ASSESSMENT   Patient demonstrates fair progress this session, goals  remain in progress.      Patient is requiring maximum assist as a result of the following impairments: decreased functional strength, decreased endurance/aerobic capacity, pain, impaired standing balance, decreased muscular endurance, medical status, and increased O2 needs from baseline.     Patient continues to function below baseline with bed mobility, transfers, gait, stair negotiation, standing prolonged periods, and performing household tasks.  Contributing factors to remaining limitations include decreased functional strength, decreased endurance/aerobic capacity, pain, impaired standing balance, decreased muscular endurance, medical status, and increased O2 needs from baseline.  Next session anticipate patient to progress bed mobility, transfers, gait, standing prolonged periods, performing household tasks, and stoop steps for entry into home  .  Physical Therapy will continue to follow patient for duration of hospitalization.    Patient continues to benefit from continued skilled PT services: at discharge to promote prior level of function and safety with additional support and return home with home health PT.  Pt with poor activity tolerance , HFR , need for max assisted mobility with inability to progress to ambulation / transfers only this session.  Pt with recent RHC , significant cardiac history with palliative care on consult.  Therapy discussed DC plan with pt  . Pt has a RW  with a goal for DC to home setting with her spouse one stoop step to enter .  Therapy encouraged purchase of transport chair due to deconditioned status.  Pt with significant pain in neck area.  DC plans and updated therapy recommendations will be pending progress with therapy ,further acute management and pt / family goals of care.   Therapy anticipates pt will DC to home setting with 24hr care of spouse and HH PT when medically stable.     PLAN  PT Treatment Plan: Bed mobility;Body mechanics;Endurance;Energy conservation;Patient education;Family education;Gait training;Balance training;Transfer training  Frequency (Obs): 5x/week    SUBJECTIVE  \"I want to get up \"   Pain in neck and SOB during activity with generalized weakness noted     OBJECTIVE  Precautions: Cervical brace;Spine;Bed/chair alarm;Fluid restriction    WEIGHT BEARING RESTRICTION                PAIN ASSESSMENT   Rating: Other (Comment)  Location: neck pain   per RN pt is medicated  Management Techniques: Activity promotion;Body mechanics;Breathing techniques;Relaxation;Repositioning    BALANCE  Static Sitting: Fair +  Dynamic Sitting: Not tested  Static Standing: Poor +  Dynamic Standing: Poor    ACTIVITY TOLERANCE  Pulse: 67 (resting    activity standing 74)        BP: 104/67 (resting   post actvity  115/87)              O2 WALK  Oxygen Therapy  SPO2% on Oxygen at Rest: 89  At rest oxygen flow (liters per minute): 2 (pt received on 2L O2 sats 89-90 % resting in bed .  RN in room during session increasing O2 to 4L   Pt remained on 4L --O2 sats during therapy activity 94 %)    AM-PAC '6-Clicks' INPATIENT SHORT FORM - BASIC MOBILITY  How much difficulty does the patient currently have...  Patient Difficulty: Turning over in bed (including adjusting bedclothes, sheets and blankets)?: A Little   Patient Difficulty: Sitting down on and standing up from a chair with arms (e.g., wheelchair, bedside commode, etc.): A Lot   Patient Difficulty: Moving from  lying on back to sitting on the side of the bed?: A Little   How much help from another person does the patient currently need...   Help from Another: Moving to and from a bed to a chair (including a wheelchair)?: A Lot   Help from Another: Need to walk in hospital room?: A Lot   Help from Another: Climbing 3-5 steps with a railing?: A Lot     AM-PAC Score:  Raw Score: 14   Approx Degree of Impairment: 61.29%   Standardized Score (AM-PAC Scale): 38.1   CMS Modifier (G-Code): CL    FUNCTIONAL ABILITY STATUS  Functional Mobility/Gait Assessment  Gait Assistance: Maximum assistance  Distance (ft): SPT bed to chair with no AD -- after transfer to chair therapy locating a RW for room     Gait belt used with HHA  max of one with second person of RN present for SBA and line managment  Assistive Device: None  Pattern:  (SPT difficulty with progression of LE / generalized weakness and feeling of SOB with transfer)  Rolling: minimal assist  Supine to Sit: moderate assist Log roll sequence  Sit to Stand: maximum assist overall     Patient received supine in bed, agreeable to physical therapy. Vital signs monitored as noted above, no adverse symptoms and patient stable during session except pt with SOB symptoms during activity , RN present and aware of pt reports     Education with pt provided verbally and while practicing during session on Physical therapy plan of care, physiological benefits of out of bed mobility, and B AP . Pacing ,  log roll sequencing , spine sparing fxn mobility , and DC Planning  .     The patient's Approx Degree of Impairment: 61.29% has been calculated based on documentation in the [facility] '6 clicks' Inpatient Daily Activity Short Form.  Research supports that patients with this level of impairment may benefit from a rehab facility .  Final disposition will be made by interdisciplinary medical team.      Patient End of Session: Up in chair;Needs met;Call light within reach;RN aware of  session/findings;Alarm set;All patient questions and concerns addressed    CURRENT GOALS   Goals to be met by: 8/10  Patient Goal Patient's self-stated goal is: unstated    Goal #1 Patient is able to demonstrate supine - sit EOB @ level: supervision      Goal #1   Current Status  as above    Goal #2 Patient is able to demonstrate transfers Sit to/from Stand at assistance level: supervision with walker - rolling      Goal #2  Current Status  as above    Goal #3 Patient is able to ambulate 50 feet with assist device: walker - rolling at assistance level: supervision   Goal #3   Current Status  as above    Goal #4 Patient will negotiate 2 stairs/one curb w/ assistive device and supervision   Goal #4   Current Status  NT  per pt one stoop step to enter    Goal #5 Patient to demonstrate independence with home activity/exercise instructions provided to patient in preparation for discharge.   Goal #5   Current Status  in progress    Goal #6     Goal #6  Current Status       Therapy activity 2 units

## 2024-07-18 NOTE — PROGRESS NOTES
[facility]  part of [facility]    Progress Note    Margaret Silverio Patient Status:  Inpatient    3/14/1945 MRN [id number]   Location [facility] 3W/SW Attending Regan Cruz MD   Hosp Day # 3 PCP Johnathon Rodriguez DO     Chief Complaint:   Chief Complaint   Patient presents with    Shortness Of Breath   SOB x 1 week. Was started on o2  with no improvement.     Subjective:   Margaret Silverio is resting in bed.  She is very uncomfortable from her neck pain.  SOB improved.     10 ROS completed and otherwise negative.   Objective:   Objective:    Blood pressure 116/61, pulse 74, temperature 98.5 °F (36.9 °C), temperature source Oral, resp. rate 22, weight 113 lb 9.6 oz (51.5 kg), SpO2 94%.    Gen: uncomfortable  Pulm: decreased bs  CV: Heart with regular rate and rhythm  Abd: Abdomen soft, nontender, nondistended, bowel sounds present  Neuro: No acute focal deficits  MSK: moves extremities  Skin: Warm and dry  Psych: Normal affect  Ext: no cyanosis        Results:   Results:    Labs:  Recent Labs   Lab 07/15/24  1455 24  0701 24  0646 24  0923   WBC 1.5* 1.8* 2.7* 4.5   HGB 9.6* 9.6* 9.2* 10.0*   .0* 101.0* 100.7* 101.0*   .0 209.0 181.0 189.0       Recent Labs   Lab 24  0701 24  0646 24  0923   GLU 78 79 106*   BUN 46* 44* 40*   CREATSERUM 1.96* 1.89* 1.80*   CA 8.9 8.8 9.6    141 138   K 4.2 4.1 5.0    105 101   CO2 28.0 29.0 31.0       Estimated Creatinine Clearance: 20.9 mL/min (A) (based on SCr of 1.8 mg/dL (H)).    No results for input(s): \"PTP\", \"INR\" in the last 168 hours.         Culture:  Hospital Encounter on 07/15/24   1. Body Fluid Cult Aerobic and Anaerobic     Status: None (Preliminary result)    Collection Time: 24  4:00 PM    Specimen: Pleural Fluid, Left; Body fluid, unspecified   Result Value Ref Range    Body Fluid Culture Result No Growth 1 Day N/A    Body Fluid Smear No WBCs seen N/A     Body Fluid Smear No organisms seen N/A    Body Fluid Smear This is a cytocentrifuged smear. N/A       Cardiac  No results for input(s): \"TROP\", \"PBNP\" in the last 168 hours.      Imaging: Imaging data reviewed in [facility].  US THORACENTESIS GUIDED LEFT (CPT=32555)    Result Date: 7/16/2024  CONCLUSION: Ultrasound imaging was performed for left thoracentesis.   Dictated by (CST): Maxx Enriquez MD on 7/16/2024 at 4:38 PM     Finalized by (CST): Maxx Enriquez MD on 7/16/2024 at 4:38 PM          XR CHEST AP PORTABLE  (CPT=71045)    Result Date: 7/16/2024  CONCLUSION:  1.  Post left thoracentesis.  Moderate-sized left pleural effusion which is otherwise smaller since prior exam.  Improved aeration of the left lung.  No pneumothorax. 2.  28 mm diameter nodular opacity in the right lung base likely representing atelectatic change however it is more discrete on this exam and recommend attention to this finding at time of follow-up imaging to exclude other pneumonitis or mass.   Dictated by (CST): Maxx Enriquez MD on 7/16/2024 at 4:31 PM     Finalized by (CST): Maxx Enriquez MD on 7/16/2024 at 4:34 PM           Medications:    morphINE ER  15 mg Oral 2 times per day    senna-docusate  2 tablet Oral Daily    midodrine  5 mg Oral BID AC    heparin  5,000 Units Subcutaneous 2 times per day    amiodarone  200 mg Oral Daily    [Held by provider] carvedilol  3.125 mg Oral BID with meals    folic acid  800 mcg Oral Daily    gabapentin  300 mg Oral TID    isosorbide dinitrate  20 mg Oral TID (Nitrates)    pantoprazole  40 mg Oral BID AC         Assessment and Plan:   Assessment & Plan:      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   Dobutamine gtt   Bumex 1mg/hr gtt.   S/p thoracentesis with 1L transudative fluid removed 7/16    GDMT: Hold home farxiga, aldactone, coreg, entresto due to hypotension.   LHC 2022 - normal coronaries.   S/p RHC 7/17 showed severe volume overload   Cards on  consult  I/O, daily weights.   ECHO LVEF 30-35% dyskinesis of anterior walls. RV size increased  Large L pleural effusion   Hypoxia   S/p thoracentesis 1L taken off. transudate  Pulm on consult.   Was recently placed on o2 at home on 7/9 2L continuous prior to this not on home o2  Has h/o RA. Check ESR/CRP if elevatated will consider prednisone. Elevated - will try medrol dose pack  H/o paroxysmal A.fib   AV paced.   H/o BRANDYN occlusion   Not on A/C due to h/o GI bleed.   Amiodarone   Leukopenia   WBC 1.5   Hold methotrexate  Rpt labs in AM.   GEORGE on CKD IV   Baseline creat 1.4 -1.6 per EMR.   BUN/creat trending down with diuresis.   Rpt labs in AM. Improved  Hypotension   Midodrine 2.5mg BID  Hold isordil for sBP < 100   Acute on chronic neck pain with radiculopathy  Multilevel spondylosis C3-5  Was seen by neurosurgery not a surgical candidate.   Pain control.   Palliative care consult   Cont gabapentin 300mg TID, cont norco PRN, will try adding oral morphine and see if this will achieve better pain control  Recently has GIUSEPPE outpt   Other medical problems  Severe RA with multiple joint deformities     35 minutes of critical care time spent reviewing chart, adjusting medications, monitoring patient on IV dobutamine and IV bumex drip, discussing with team    Supplementary Documentation:     Quality:  DVT Prophylaxis: SCD  CODE status: DNR   Dispo: per clinical course           Estimated date of discharge: TBD  Discharge is dependent on: clinical stability  At this point Ms. Silverio is expected to be discharge to: home

## 2024-07-18 NOTE — PROGRESS NOTES
[facility]  part of [facility]    Progress Note      Assessment and Plan:   1.  Acute on chronic respiratory failure-patient was pulmonary edema and moderate to large left pleural effusion status post large-volume thoracentesis.  Patient denies dyspnea at this juncture.  She remains on low-dose dobutamine and her right heart catheterization demonstrated a wedge pressure of 16.  Cardiomyopathy s/p MVR with paroxysmal atrial fibrillation.    Recommendations:  1.  Gentle diuresis  2.  Dobutamine  3.  Amiodarone Coreg Isordil  4.  Bumetanide drip  5.  Will follow clinically.  6.  Morning chest x-ray.    2.  DVT prophylaxis-subcutaneous heparin    3.  CODE STATUS-DNAR select    Subjective:   Margaret Silverio is a(n) 79 year old female who is breathing comfortably    Objective:   Blood pressure 134/65, pulse 74, temperature 98.3 °F (36.8 °C), temperature source Oral, resp. rate 20, weight 113 lb 9.6 oz (51.5 kg), SpO2 93%.    Physical Exam alert black female  HEENT examination is unremarkable with pupils equal round and reactive to light and accommodation.   Neck without adenopathy, thyromegaly, JVD nor bruit.   Lungs diminished at bases r to auscultation and percussion.  Cardiac regular rate and rhythm 1 out of 6 systolic ejection murmur.   Abdomen nontender, without hepatosplenomegaly and no mass appreciable.   Extremities without clubbing cyanosis nor edema.   Neurologic grossly intact with symmetric tone and strength and reflex.  Skin without gross abnormality     Results:     Lab Results   Component Value Date    WBC 4.5 07/18/2024    HGB 10.0 07/18/2024    HCT 31.6 07/18/2024    .0 07/18/2024    CREATSERUM 1.80 07/18/2024    BUN 40 07/18/2024     07/18/2024    K 5.0 07/18/2024     07/18/2024    CO2 31.0 07/18/2024     07/18/2024    CA 9.6 07/18/2024    MG 1.9 07/18/2024       Aroldo Pacheco MD  Medical Director, Critical Care, [facility]  Medical Director, [place]  [facility]  Pager: [phone]

## 2024-07-18 NOTE — PLAN OF CARE
Pt alert and oriented x4. Pt on room air to 2L. Dobutamine and bumex gtt per orders. Pt experienced 3/10 cp in AM; cardiology aware. PRN morphine given for pain. MS contin added per palliative. Pt max assist.   Problem: Patient Centered Care  Goal: Patient preferences are identified and integrated in the patient's plan of care  Description: Interventions:  - What would you like us to know as we care for you? I'm from home with my family  - Provide timely, complete, and accurate information to patient/family  - Incorporate patient and family knowledge, values, beliefs, and cultural backgrounds into the planning and delivery of care  - Encourage patient/family to participate in care and decision-making at the level they choose  - Honor patient and family perspectives and choices  Outcome: Progressing     Problem: Patient/Family Goals  Goal: Patient/Family Long Term Goal  Description: Patient's Long Term Goal: discharge home    Interventions:  - monitor vitals, labs, imaging  - cards, pulm on consult  - See additional Care Plan goals for specific interventions  Outcome: Progressing  Goal: Patient/Family Short Term Goal  Description: Patient's Short Term Goal: manage pain    Interventions:   - Frequent pain assessments  - PRN pain meds  - non-pharmacological interventions  - See additional Care Plan goals for specific interventions  Outcome: Progressing     Problem: CARDIOVASCULAR - ADULT  Goal: Maintains optimal cardiac output and hemodynamic stability  Description: INTERVENTIONS:  - Monitor vital signs, rhythm, and trends  - Monitor for bleeding, hypotension and signs of decreased cardiac output  - Evaluate effectiveness of vasoactive medications to optimize hemodynamic stability  - Monitor arterial and/or venous puncture sites for bleeding and/or hematoma  - Assess quality of pulses, skin color and temperature  - Assess for signs of decreased coronary artery perfusion - ex. Angina  - Evaluate fluid balance, assess  for edema, trend weights  Outcome: Progressing  Goal: Absence of cardiac arrhythmias or at baseline  Description: INTERVENTIONS:  - Continuous cardiac monitoring, monitor vital signs, obtain 12 lead EKG if indicated  - Evaluate effectiveness of antiarrhythmic and heart rate control medications as ordered  - Initiate emergency measures for life threatening arrhythmias  - Monitor electrolytes and administer replacement therapy as ordered  Outcome: Progressing     Problem: RESPIRATORY - ADULT  Goal: Achieves optimal ventilation and oxygenation  Description: INTERVENTIONS:  - Assess for changes in respiratory status  - Assess for changes in mentation and behavior  - Position to facilitate oxygenation and minimize respiratory effort  - Oxygen supplementation based on oxygen saturation or ABGs  - Provide Smoking Cessation handout, if applicable  - Encourage broncho-pulmonary hygiene including cough, deep breathe, Incentive Spirometry  - Assess the need for suctioning and perform as needed  - Assess and instruct to report SOB or any respiratory difficulty  - Respiratory Therapy support as indicated  - Manage/alleviate anxiety  - Monitor for signs/symptoms of CO2 retention  Outcome: Progressing     Problem: PAIN - ADULT  Goal: Verbalizes/displays adequate comfort level or patient's stated pain goal  Description: INTERVENTIONS:  - Encourage pt to monitor pain and request assistance  - Assess pain using appropriate pain scale  - Administer analgesics based on type and severity of pain and evaluate response  - Implement non-pharmacological measures as appropriate and evaluate response  - Consider cultural and social influences on pain and pain management  - Manage/alleviate anxiety  - Utilize distraction and/or relaxation techniques  - Monitor for opioid side effects  - Notify MD/LIP if interventions unsuccessful or patient reports new pain  - Anticipate increased pain with activity and pre-medicate as appropriate  Outcome:  Progressing

## 2024-07-18 NOTE — PROGRESS NOTES
[facility] Cardiology Progress Note    Margaret Silverio Patient Status:  Inpatient    3/14/1945 MRN [id number]   Location [facility] 3W/SW Attending Katiana Ochoa MD   Hosp Day # 3 PCP Johnathon Rodriguez DO     Subjective:  Reports pain to the back of her neck/head  Reports mild mid sternal chest discomfort at rest that improved w/ morphine   Denies sob     Objective:  /52 (BP Location: Radial)   Pulse 71   Temp 98.5 °F (36.9 °C) (Oral)   Resp 22   Wt 113 lb 9.6 oz (51.5 kg)   SpO2 96%   BMI 19.50 kg/m²     Telemetry: V paced      Intake/Output:    Intake/Output Summary (Last 24 hours) at 2024 0934  Last data filed at 2024 0625  Gross per 24 hour   Intake 860.13 ml   Output 1475 ml   Net -614.87 ml       Last 3 Weights   24 0458 113 lb 9.6 oz (51.5 kg)   24 0508 117 lb 14.4 oz (53.5 kg)   24 0614 123 lb 12.8 oz (56.2 kg)   07/15/24 1830 124 lb 1.6 oz (56.3 kg)   24 1505 124 lb 12.8 oz (56.6 kg)   24 1501 136 lb (61.7 kg)       Labs:  Recent Labs   Lab 07/15/24  1455 07/15/24  1634 24  0701 24  0646   GLU 96  --  78 79   BUN  --  51* 46* 44*   CREATSERUM 2.27*  --  1.96* 1.89*   EGFRCR 22*  --  26* 27*   CA 9.2  --  8.9 8.8     --  142 141   K  --  5.3* 4.2 4.1     --  106 105   CO2 27.0  --  28.0 29.0     Recent Labs   Lab 07/15/24  1455 24  0701 24  0646   RBC 2.97* 3.01* 2.82*   HGB 9.6* 9.6* 9.2*   HCT 30.0* 30.4* 28.4*   .0* 101.0* 100.7*   MCH 32.3 31.9 32.6   MCHC 32.0 31.6 32.4   RDW 20.0* 19.3* 19.2*   NEPRELIM 0.86* 0.76*  --    WBC 1.5* 1.8* 2.7*   .0 209.0 181.0         No results for input(s): \"TROP\", \"TROPHS\", \"CK\" in the last 168 hours.    Diagnostics:     24 RHC   Procedures performed:  Right heart catheterization  Initial plan was to perform via right IJ approach to leave in [place] patient refused to have a knee intervention through her neck then switched to the  groin approach.     Right heart catheterization was performed which showed:  RV 38/19  Mean RA 22 mmHg  Wedge 16 mmHg  PA 47/21.     AO saturation 100%, PA saturation 56.8  Cardiac output 3.5 L/min, cardiac index 2.2.     Unable to advance [facility] [place] into the PA.  Right heart catheterization was performed without leaving S1.     Complications none     Summary fluid overload with evidence of congestion.  Right-sided ventricular failure.  Continue Bumex drip at 1 mg an hour.    Review of Systems   Respiratory: Negative.     Cardiovascular:  Positive for chest pain.     Physical Exam:    General: Alert and oriented x 3. Chronically ill.   HEENT: Normocephalic, anicteric sclera, neck supple, no thyromegaly or adenopathy. + Pain to the back of her head / neck   Neck: No JVD, carotids 2+, no bruits.  Cardiac: Regular rate & rhythm. S1, S2 normal. No murmur, pericardial rub, S3, or extra cardiac sounds.  Lungs: Clear without wheezes, rales, rhonchi or dullness.  Normal excursions and effort.  Abdomen: Soft, non-tender. No organosplenomegally, mass or rebound, BS-present.  Extremities: Without clubbing or cyanosis.  No left lower extremity edema, no right lower extremity edema.  Neurologic: Alert and oriented, normal affect. No focal defects  Skin: Warm and dry.       Medications:   morphINE ER  15 mg Oral 2 times per day    senna-docusate  2 tablet Oral Daily    midodrine  5 mg Oral BID AC    heparin  5,000 Units Subcutaneous 2 times per day    amiodarone  200 mg Oral Daily    [Held by provider] carvedilol  3.125 mg Oral BID with meals    folic acid  800 mcg Oral Daily    gabapentin  300 mg Oral TID    isosorbide dinitrate  20 mg Oral TID (Nitrates)    pantoprazole  40 mg Oral BID AC      bumetanide (Bumex) 12.5 mg in 50 mL infusion 1 mg/hr (07/17/24 4928)    DOBUTamine 5 mcg/kg/min (07/17/24 2102)       Assessment:    Nonischemic severe biventricular failure, s/p DC ICD    - Normal coronaries on [facility] in Oct '22  - 7/15 CXR -  mod-large left pleural effusion noted, progressive pulm edema   - 7/16 S/p left thoracentesis w/ 1 L removed  - 7/17 RHC - RV 38/19, Mean RA 22 mmHg, Wedge 16 mmHg, PA 47/21. CI 2.2. Evidence of congestion & R sided vent failure. Started on bumex gtt 1mg/hr  - On dobutamine gtt 5 mcg/kg/min   - GDMT : historically on farxiga. Aldactone, & entresto held due to hypotension. On isordil     Hx of paroxysmal atrial flutter/fib  - AV paced  - Hx of L atrial appendage occlusion  - On amiodarone. Coreg on hold while on dobutamine gtt     Hx of surgical mitral valve replacement    GEORGE on CKD 4  - Improving w/ diuresis     Hypotension   - On midodrine     Hx of severe GI bleeding this year   Severe rheumatoid arthritis  Leukopenia  Acute on chronic neck pain w/ radiculopathy  Multilevel spondylosis C3-5     Plan:    Good UOP today. Continue bumex gtt, dobs gtt, isordil   Optimize GDMT as bp & renal fx permits   Monitor accurate I/o , daily wts & renal fx closely   Reports severe neck / \"back of head\" pain w/ mild chest discomfort at rest . Low suspicion for ACS. EKG w/ paced rhythm. Will draw troponin   Pain management per PMD     YOLIE Bucio  7/18/2024  9:34 AM  Ph [phone] ([place])  Ph [phone] ([place])      L3    Seen and examined bedside. Agree with the present management, will follow with you.    Chest pain low suspicion for acute coronary syndrome  Great urine output continue Bumex and dobutamine drip.  Refused IJ leave in [place] yesterday, right heart catheterization was performed.  Continue diuretics.  Pain management as per primary team  1 L fluid removed on thoracentesis 7/16.    Appreciate palliative care input overall prognosis is poor.    Thank you for allowing me to participate in the care of your patient, feel free to contact me if you have any questions.    Tyler Vaz MD  [facility] [place]  Interventional Cardiology.  [phone]

## 2024-07-19 ENCOUNTER — APPOINTMENT (OUTPATIENT)
Dept: GENERAL RADIOLOGY | Facility: HOSPITAL | Age: 79
End: 2024-07-19
Attending: INTERNAL MEDICINE
Payer: MEDICARE

## 2024-07-19 LAB
ANION GAP SERPL CALC-SCNC: 5 MMOL/L (ref 0–18)
BASOPHILS # BLD AUTO: 0.03 X10(3) UL (ref 0–0.2)
BASOPHILS NFR BLD AUTO: 0.8 %
BUN BLD-MCNC: 41 MG/DL (ref 9–23)
BUN/CREAT SERPL: 20.5 (ref 10–20)
CALCIUM BLD-MCNC: 9.6 MG/DL (ref 8.7–10.4)
CHLORIDE SERPL-SCNC: 100 MMOL/L (ref 98–112)
CO2 SERPL-SCNC: 32 MMOL/L (ref 21–32)
CREAT BLD-MCNC: 2 MG/DL
DEPRECATED RDW RBC AUTO: 65.5 FL (ref 35.1–46.3)
EGFRCR SERPLBLD CKD-EPI 2021: 25 ML/MIN/1.73M2 (ref 60–?)
EOSINOPHIL # BLD AUTO: 0.08 X10(3) UL (ref 0–0.7)
EOSINOPHIL NFR BLD AUTO: 2.2 %
ERYTHROCYTE [DISTWIDTH] IN BLOOD BY AUTOMATED COUNT: 19.1 % (ref 11–15)
GLUCOSE BLD-MCNC: 82 MG/DL (ref 70–99)
HCT VFR BLD AUTO: 30.9 %
HGB BLD-MCNC: 9.7 G/DL
IMM GRANULOCYTES # BLD AUTO: 0.03 X10(3) UL (ref 0–1)
IMM GRANULOCYTES NFR BLD: 0.8 %
LYMPHOCYTES # BLD AUTO: 1 X10(3) UL (ref 1–4)
LYMPHOCYTES NFR BLD AUTO: 27 %
MAGNESIUM SERPL-MCNC: 1.9 MG/DL (ref 1.6–2.6)
MCH RBC QN AUTO: 31.6 PG (ref 26–34)
MCHC RBC AUTO-ENTMCNC: 31.4 G/DL (ref 31–37)
MCV RBC AUTO: 100.7 FL
MONOCYTES # BLD AUTO: 0.57 X10(3) UL (ref 0.1–1)
MONOCYTES NFR BLD AUTO: 15.4 %
NEUTROPHILS # BLD AUTO: 1.99 X10 (3) UL (ref 1.5–7.7)
NEUTROPHILS # BLD AUTO: 1.99 X10(3) UL (ref 1.5–7.7)
NEUTROPHILS NFR BLD AUTO: 53.8 %
OSMOLALITY SERPL CALC.SUM OF ELEC: 293 MOSM/KG (ref 275–295)
PLATELET # BLD AUTO: 156 10(3)UL (ref 150–450)
POTASSIUM SERPL-SCNC: 4.1 MMOL/L (ref 3.5–5.1)
RBC # BLD AUTO: 3.07 X10(6)UL
SODIUM SERPL-SCNC: 137 MMOL/L (ref 136–145)
WBC # BLD AUTO: 3.7 X10(3) UL (ref 4–11)

## 2024-07-19 PROCEDURE — 99232 SBSQ HOSP IP/OBS MODERATE 35: CPT | Performed by: INTERNAL MEDICINE

## 2024-07-19 PROCEDURE — 99233 SBSQ HOSP IP/OBS HIGH 50: CPT | Performed by: INTERNAL MEDICINE

## 2024-07-19 PROCEDURE — 71045 X-RAY EXAM CHEST 1 VIEW: CPT | Performed by: INTERNAL MEDICINE

## 2024-07-19 RX ORDER — HYDROMORPHONE HYDROCHLORIDE 1 MG/ML
0.5 INJECTION, SOLUTION INTRAMUSCULAR; INTRAVENOUS; SUBCUTANEOUS EVERY 2 HOUR PRN
Status: DISCONTINUED | OUTPATIENT
Start: 2024-07-19 | End: 2024-07-21

## 2024-07-19 RX ORDER — HYDROMORPHONE HYDROCHLORIDE 1 MG/ML
2 INJECTION, SOLUTION INTRAMUSCULAR; INTRAVENOUS; SUBCUTANEOUS EVERY 2 HOUR PRN
Status: DISCONTINUED | OUTPATIENT
Start: 2024-07-19 | End: 2024-07-21

## 2024-07-19 RX ORDER — HYDROMORPHONE HYDROCHLORIDE 1 MG/ML
1 INJECTION, SOLUTION INTRAMUSCULAR; INTRAVENOUS; SUBCUTANEOUS EVERY 2 HOUR PRN
Status: DISCONTINUED | OUTPATIENT
Start: 2024-07-19 | End: 2024-07-21

## 2024-07-19 NOTE — PLAN OF CARE
Pt alert and oriented x4.  Pt on room air. Dobutamine and bumex gtt per orders. PRN morphine, ms contin, norco, and dilaudid given prn. Pt refused afternoon gabapentin and Protonix.   Problem: Patient Centered Care  Goal: Patient preferences are identified and integrated in the patient's plan of care  Description: Interventions:  - What would you like us to know as we care for you? I'm from home with my family  - Provide timely, complete, and accurate information to patient/family  - Incorporate patient and family knowledge, values, beliefs, and cultural backgrounds into the planning and delivery of care  - Encourage patient/family to participate in care and decision-making at the level they choose  - Honor patient and family perspectives and choices  Outcome: Progressing     Problem: Patient/Family Goals  Goal: Patient/Family Long Term Goal  Description: Patient's Long Term Goal: discharge home    Interventions:  - monitor vitals, labs, imaging  - cards, pulm on consult  - See additional Care Plan goals for specific interventions  Outcome: Progressing  Goal: Patient/Family Short Term Goal  Description: Patient's Short Term Goal: manage pain    Interventions:   - Frequent pain assessments  - PRN pain meds  - non-pharmacological interventions  - See additional Care Plan goals for specific interventions  Outcome: Progressing     Problem: CARDIOVASCULAR - ADULT  Goal: Maintains optimal cardiac output and hemodynamic stability  Description: INTERVENTIONS:  - Monitor vital signs, rhythm, and trends  - Monitor for bleeding, hypotension and signs of decreased cardiac output  - Evaluate effectiveness of vasoactive medications to optimize hemodynamic stability  - Monitor arterial and/or venous puncture sites for bleeding and/or hematoma  - Assess quality of pulses, skin color and temperature  - Assess for signs of decreased coronary artery perfusion - ex. Angina  - Evaluate fluid balance, assess for edema, trend  weights  Outcome: Progressing  Goal: Absence of cardiac arrhythmias or at baseline  Description: INTERVENTIONS:  - Continuous cardiac monitoring, monitor vital signs, obtain 12 lead EKG if indicated  - Evaluate effectiveness of antiarrhythmic and heart rate control medications as ordered  - Initiate emergency measures for life threatening arrhythmias  - Monitor electrolytes and administer replacement therapy as ordered  Outcome: Progressing     Problem: RESPIRATORY - ADULT  Goal: Achieves optimal ventilation and oxygenation  Description: INTERVENTIONS:  - Assess for changes in respiratory status  - Assess for changes in mentation and behavior  - Position to facilitate oxygenation and minimize respiratory effort  - Oxygen supplementation based on oxygen saturation or ABGs  - Provide Smoking Cessation handout, if applicable  - Encourage broncho-pulmonary hygiene including cough, deep breathe, Incentive Spirometry  - Assess the need for suctioning and perform as needed  - Assess and instruct to report SOB or any respiratory difficulty  - Respiratory Therapy support as indicated  - Manage/alleviate anxiety  - Monitor for signs/symptoms of CO2 retention  Outcome: Progressing     Problem: PAIN - ADULT  Goal: Verbalizes/displays adequate comfort level or patient's stated pain goal  Description: INTERVENTIONS:  - Encourage pt to monitor pain and request assistance  - Assess pain using appropriate pain scale  - Administer analgesics based on type and severity of pain and evaluate response  - Implement non-pharmacological measures as appropriate and evaluate response  - Consider cultural and social influences on pain and pain management  - Manage/alleviate anxiety  - Utilize distraction and/or relaxation techniques  - Monitor for opioid side effects  - Notify MD/LIP if interventions unsuccessful or patient reports new pain  - Anticipate increased pain with activity and pre-medicate as appropriate  Outcome: Progressing

## 2024-07-19 NOTE — PROGRESS NOTES
[facility]  part of [facility]  Palliative Care Progress Note    Margaret Silverio Patient Status:  Inpatient    3/14/1945 MRN [id number]   Location [facility] 3W/SW Attending Regan Cruz MD   Hosp Day # 4 PCP Johnathon Rodriguez,        The  Century Cures Act makes medical notes like these available to patients in the interest of transparency. Please be advised this is a medical document. Medical documents are intended to carry relevant information, facts as evident, and the clinical opinion of the practitioner. The medical note is intended as peer to peer communication and may appear blunt or direct. It is written in medical language and may contain abbreviations or verbiage that are unfamiliar.     Reason for Consultation: Consult ordered by:: Dr. Cruz for evaluation of Palliative Care needs and Uncontrolled symptoms;Goals of care discussion.    Subjective       She is being followed by cardiology and pulmonary services.    I reviewed labs and imaging-see below. History was obtained from [facility] and pt.        Pt is listed as DNAR/DNI-selective in Epic,  and reviewed previously completed POLST on file. I also reviewed previously completed HCPOA paperwork which shows her dtr Barbi as her primary HCPOA.     Reviewed symptom needs past 24 hrs: Morphine 2 mg IVP X1, Flexeril 5 mg po X1, Norco 10/325 mg po X2, Tylenol 500 mg po X1    Patient was seen and examined in bed with no family at bedside. Pt is A&OX4 and very pleasant. She appears frail and thin. +fatigue.  She tells me her breathing has improved since thoracentesis yesterday and remains on nc2L. She tells me recently she has been wearing 2L at home. She tells me she has chronic RAM symptoms and is mostly sitting much of the day. She reports chronic L neck pain from DDD and also pain from her RA in bilateral hands. She has been taking Norco 10/325 mg prn at home and typically takes 3-4 tabs a day which help a lot.   She is reporting 9/10, sharp, neck pain currently, has been using heating pad, and is asking for pain meds. Appetite is fair, denies abdominal pain, n/v and moved her bowels 2 days ago. Denies any constipation issues on opioids and tells me she doesn't take bowel regimen at home. Denies depression or anxiety symptoms. Pt remains on dobutamine gtt and will be getting another IV line placed for bumex gtt.  See physical exam and ROS below.    Review of Systems/Palliative Care symptom needs assessed:   Pertinent items are noted in HPI/below    Fatigue:  Yes  Dyspnea: denies at rest, chronic RAM   Current O2 therapy: nc2L  Cough: denies  Pain Present: chronic pain issues involving L neck  Non-verbal signs of pain present: NO  Appetite: fair  Constipation: denies  Diarrhea: denies  Nausea/Vomiting: denies  Depression: denies  Anxiety: denies    Medical History: obtained from [employer]  Past Medical History:    Acute kidney insufficiency    Anemia    Arrhythmia    Back problem    CHF (congestive heart failure) (HCC)    CKD (chronic kidney disease) stage 3, GFR 30-59 ml/min (HCC)    Deep vein thrombosis (HCC)    on B.T for only a month, possibly Magen    Essential hypertension    High blood pressure    History of blood transfusion    Rheumatoid arthritis (HCC)    Seizure disorder (HCC)    Pt denied at screening call 6/28/24     Past Surgical History:   Procedure Laterality Date    Cabg      Cardiac pacemaker placement      Colonoscopy N/A 01/17/2024    Dr. Rios    Colonoscopy N/A 01/17/2024    Procedure: COLONOSCOPY;  Surgeon: Zoraida Rios MD;  Location: [facility] ENDOSCOPY    Egd N/A 01/17/2024    Dr. Rios    Fracture surgery      Other surgical history  2017    Heart Surgery     Other surgical history  2020    Bilateral shoulder replacement        Family History: obtained from [facility]  No family history on file.    Palliative Care Social History:   Marital Status:   Children: 3 kids  Living Situation Prior to Admit: lives with    Does Patient Live Alone: no  Is Patient Confused: no  Occupational History: Retired, teacher,     Substance History:   reports that she quit smoking about 10 years ago. Her smoking use included cigarettes. She has never used smokeless tobacco.  reports no history of alcohol use.  reports no history of drug use.  Hx of Substance Use/Abuse: No    Spiritual Assessment:   Restorationist: Moravian   requested: declined    Allergies:  Allergies   Allergen Reactions    Lisinopril Coughing       Medications:     Current Facility-Administered Medications:     morphINE ER (MS Contin) tab 15 mg, 15 mg, Oral, 2 times per day    senna-docusate (Senokot-S) 8.6-50 MG per tab 2 tablet, 2 tablet, Oral, Daily    morphINE PF 2 MG/ML injection 2 mg, 2 mg, Intravenous, Q2H PRN    polyethylene glycol (PEG 3350) (Miralax) 17 g oral packet 17 g, 17 g, Oral, Daily PRN    bisacodyl (Dulcolax) 10 MG rectal suppository 10 mg, 10 mg, Rectal, Daily PRN    HYDROcodone-acetaminophen (Norco)  MG per tab 1 tablet, 1 tablet, Oral, Q4H PRN    senna-docusate (Senokot-S) 8.6-50 MG per tab 2 tablet, 2 tablet, Oral, Daily PRN    midodrine (ProAmatine) tab 5 mg, 5 mg, Oral, BID AC    bumetanide (Bumex) 12.5 mg in 50 mL infusion, 1 mg/hr, Intravenous, Continuous    cyclobenzaprine (Flexeril) tab 5 mg, 5 mg, Oral, TID PRN    heparin (Porcine) 5000 UNIT/ML injection 5,000 Units, 5,000 Units, Subcutaneous, 2 times per day    acetaminophen (Tylenol Extra Strength) tab 500 mg, 500 mg, Oral, Q4H PRN    ondansetron (Zofran) 4 MG/2ML injection 4 mg, 4 mg, Intravenous, Q6H PRN    metoclopramide (Reglan) 5 mg/mL injection 5 mg, 5 mg, Intravenous, Q8H PRN    DOBUTamine in dextrose 5% (Dobutrex) 500 mg/250mL infusion premix, 5 mcg/kg/min, Intravenous, Continuous    amiodarone (Pacerone) tab 200 mg, 200 mg, Oral, Daily    [Held by provider] carvedilol (Coreg) tab 3.125 mg, 3.125 mg, Oral, BID with meals    folic acid (Folvite) tab 800  mcg, 800 mcg, Oral, Daily    gabapentin (Neurontin) cap 300 mg, 300 mg, Oral, TID    isosorbide dinitrate (Isordil) tab 20 mg, 20 mg, Oral, TID (Nitrates)    pantoprazole (Protonix) DR tab 40 mg, 40 mg, Oral, BID AC    lidocaine-menthol 4-1 % patch 1 patch, 1 patch, Transdermal, Daily PRN    Nutritional status:  BMI: 20 Weight: 117  Weight loss: down 30 pounds in past 5 months   Current Appetite: Fair  Dysphagia: denies    Functional Status History:  ADLs: now mostly in bed/chair and needs helps with ADL's, she says she uses walker at home for short distance ambulation.      Palliative Performance Scale:   Prior to admission: 50%  Observed during hospitalization: 30%  % Ambulation Activity Level Self-Care Intake Consciousness   100 Full  Normal  No Disease Full Normal Full   90 Full  Normal  Some Disease Full Normal Full   80 Full  Normal w/effort  Some Disease Full Normal or reduced Full   70 Reduced  Can't Perform Job  Some Disease Full Normal or reduced Full   60 Reduced  Can't Perform Hobby   Significant Disease Occ Assist Normal or reduced Full or confused   50 Mainly sit/lie Can't do any work  Extensive Disease Partial Assist Normal or reduced Full or confused   40 Mainly in bed Can't do any work  Extensive Disease Mainly Assist Normal or reduced Full or confused   30 Bed Bound Can't do any work  Extensive Disease Max Assist  Total Care Reduced  Drowsy/confused   20 Bed Bound Can't do any work  Extensive Disease Max Assist  Total Care Minimal  Drowsy/confused   10 Bed Bound Can't do any work  Extensive Disease Max Assist  Total Care Mouth Care  Drowsy/confused   0 Death        Objective      Vital Signs:  Blood pressure 109/62, pulse 72, temperature 98 °F (36.7 °C), temperature source Oral, resp. rate 16, weight 112 lb 11.2 oz (51.1 kg), SpO2 96%.  Body mass index is 19.34 kg/m².  Present Level of pain: 9/10 L sided neck pain  Non-verbal signs of pain present: No    Physical Exam:  General: Alert and in no  apparent distress. Weak, frail, thin appearing  HEENT: No focal deficits.  Cardiac: Regular rate and rhythm, S1, S2 normal, no murmur, rub or gallop.  Lungs: Crackles bilateral bases.  Normal excursions and effort.  Abdomen: Soft, non-tender, normal bowel sounds X 4 quadrants, no rebound or guarding  Extremities: Without clubbing, cyanosis. Peripheral pulses are 2+. BLE Edema not present  Neurologic: Alert and oriented X4, normal affect.  Skin: Warm and dry.    Hematology:  Lab Results   Component Value Date    WBC 3.7 (L) 07/19/2024    HGB 9.7 (L) 07/19/2024    HCT 30.9 (L) 07/19/2024    .0 07/19/2024       Coags:  Lab Results   Component Value Date    INR 1.25 (H) 02/09/2024    PTT 35.2 02/09/2024       Chemistry:  Lab Results   Component Value Date    CREATSERUM 2.00 (H) 07/19/2024    BUN 41 (H) 07/19/2024     07/19/2024    K 4.1 07/19/2024     07/19/2024    CO2 32.0 07/19/2024    GLU 82 07/19/2024    CA 9.6 07/19/2024    ALB 4.0 07/01/2024    ALKPHO 328 (H) 06/18/2024    BILT 0.8 06/18/2024    TP 7.7 06/18/2024    AST 23 06/18/2024    ALT 11 06/18/2024    MG 1.9 07/19/2024    PHOS 3.8 07/01/2024       Imaging:  XR CHEST AP PORTABLE  (CPT=71045)    Result Date: 7/19/2024  CONCLUSION:  1. Stable moderate left and small chronic loculated right pleural effusions, probably related to CHF/fluid overload in this patient with cardiomegaly, a cardiac pacemaker and prosthetic mitral valve. 2. Stable atelectasis at the lung bases, left greater than right.    Dictated by (CST): Arsh Buenrostro MD on 7/19/2024 at 7:11 AM     Finalized by (CST): Arsh Buenrostro MD on 7/19/2024 at 7:15 AM          US THORACENTESIS GUIDED LEFT (CPT=32555)    Result Date: 7/16/2024  CONCLUSION: Ultrasound imaging was performed for left thoracentesis.   Dictated by (CST): Maxx Enriquez MD on 7/16/2024 at 4:38 PM     Finalized by (CST): Maxx Enriquez MD on 7/16/2024 at 4:38 PM          XR CHEST AP PORTABLE   (CPT=71045)    Result Date: 7/16/2024  CONCLUSION:  1.  Post left thoracentesis.  Moderate-sized left pleural effusion which is otherwise smaller since prior exam.  Improved aeration of the left lung.  No pneumothorax. 2.  28 mm diameter nodular opacity in the right lung base likely representing atelectatic change however it is more discrete on this exam and recommend attention to this finding at time of follow-up imaging to exclude other pneumonitis or mass.   Dictated by (CST): Maxx Enriquez MD on 7/16/2024 at 4:31 PM     Finalized by (CST): Maxx Enriquez MD on 7/16/2024 at 4:34 PM            6/20/24 Echocardiogram  Conclusions:     1. Left ventricle: The cavity size was normal. Wall thickness was mildly      increased. Systolic function was markedly reduced. The estimated ejection      fraction was 30-35%, by biplane method of disks. Dyskinesis of the      anteroseptal and anterior walls. Unable to assess LV diastolic function      due to valvular repair/replacement.   2. Right ventricle: The cavity size was increased. Pacer wire noted in the      right ventricle.   3. Left atrium: The atrium was markedly dilated.   4. Right atrium: The atrium was dilated.   5. Atrial septum: There was no atrial level shunt.   6. Aortic valve: The findings were consistent with very mild stenosis. The      peak systolic velocity was 1.71m/sec. The mean systolic gradient was 6mm      Hg. The valve area (VTI) was 1.71cm^2.   7. Mitral valve: A bioprosthesis is present. There was no significant      regurgitation. The mean diastolic gradient was 6mm Hg.   8. Tricuspid valve: There was wide-open regurgitation.   Impressions:  This study is compared with previous dated 2/10/2024:   *     6/3/24 MRI Cervical spine  CONCLUSION:   Motion-degraded examination. Within these parameters:   1. Multilevel degenerative disc disease throughout the cervical spine with ventral cord indentation at C3-C4 and C4-C5.      2. Multilevel foraminal  compromise.      3. No abnormal intrinsic cord signal intensity is apparent.      4. No acute osseous injury of the cervical spine is detected.      5. Nonspecific prevertebral edema or retropharyngeal fluid accumulation. No drainable collection is evident. This may be reactive in nature, but correlation for relevant symptomatology is recommended.      6. Pleural effusions are partially delineated.      7. Trace left mastoid effusion.      8. Lesser incidental findings as above     Summary of GOC Discussion        I discussed reason for palliative care consultation with patient.     I differentiated the palliative treatment-focus model versus the hospice comfort-focused philosophy of care. I informed the patient/family that having palliative care support does not limit medical treatment options or decisions to those who wish to continue curative or restorative medical therapies. I discussed the benefits of palliative care to include assistance with arising symptom management needs, an extra layer of support, to ensure GOC are respected throughout healthcare continuum, and assist with transition to hospice care when appropriate.  Palliative care handout provided.    Outpatient/Community Palliative Care Services:  Usually visit once per 4 weeks depending on contracted agency guidelines  Focus on GOC and symptom management   Palliative Care criteria:  Not altered by prognosis   Does not limit curative or restorative therapies      Outpatient Hospice services:  24/7 phone triage services   RN visit one or more times per week depending on need  Home health aid to assist in ADLs/hygiene   Hospice criteria:  Less than six-month prognosis   Must forego most life-prolonging  measures/treatments   Focus solely on comfort   Must sign onto hospice benefit with agency       Prognostic awareness/understanding: Fair-needs reinforcement by MDs    -I  discussed current clinical condition and explored previous discussions with  MDs.    -I discussed the normal disease trajectory of CHF, severe TVR with associated symptoms and decline over time. CHF teaching provided and encouraged good compliance with medications and lifestyle.     -We talked about her recurrent recent hospitalizations over the past 6 months with functional decline.     Hopes/goals/concerns:   -Pt tells me she would like to talk with cardiology further about possible R heart cath today to hear more about the risks with this test before proceeding.     -Pt says she wants to continue with supportive care and tells me she is \"not giving up yet.\" I did provide education on option for comfort care with hospice, but she is not ready at this time.     -Pt is also hoping for improved pain control while in the hospital and I discussed changing her Norco frequency to Q 4 hrs prn and will add low dose Morphine IVP prn severe BTP. She does want to keep her comfort a priority.     -Pt is hopeful to return home with her  on dc. I recommended having a community palliative care program following on dc.     -Discussed ongoing GOC discussions will be needed over time and she is agreeable to palliative care following.     Advance Care Planning counseling and discussion:     -I confirmed pt's dtr Barbi is primary HCPOA and confirmed wishes for DNAR/DNI-selective.     -Pt gave me permission to call her dtr Barbi with update on today's visit. I spoke to Barbi who remembered me from previous admission and I reviewed our discussion from today    -Provided emotional support to pt/dtr who are coping adequately.     Procedures:  No intubation    Assessment and Recommendations      Problem List:    Acute on chronic severe non-ischemic biventricular CHF   Severe TVR  L pleural effusion s/p thora on 7/16 1L removed  GEORGE on CKD  Hypoxia  Hypotension on midodrine  H/o Bioprosthetic mitral valve  H/o L subclavian DVT  Pacemaker/AICD  Pancytopenia  Pafib  H/o GIB  Rheumatoid  arthritis  Weakness     Chronic neck pain r/t multilevel DDD/RA  -Ongoing issue  -Continue Tylenol 500 mg po Q 4 hrs prn  -Cont Norco 10/325 mg 1 tab po Q 4 hrs prn   -Continue Gabapentin 300 mg po TID  -Continue Flexeril 5 mg po TID prn  -Cont Mscontin 15mg BID  -Heating pad prn  -Will continue to monitor pain symptoms closely.   -consider rheumatology for discussion of additional DMARD     Constipation  -Stable, no constipation issues currently  -Add Miralax daily prn, Senna S 2 tabs daily prn, ducolax supp prn while on opioid tx       Goals of care counseling  -see above for details  -Confirmed wishes for DNAR/DNI-selective and continue supportive care.  -Pt would like to discuss R heart cath further with cardiology prior to proceeding.  -Ongoing GOC discussions will be needed over time.  -Pt/dtr are aware of the option for comfort care with hospice, NOT ready for this right now.  -Agreeable to palliative care following  -Dispo:  Pt prefers to return home on dc. Recommended having a community palliative care program following.  SW to help with dc planning.   -Provided emotional support to pt/family who are coping adequately.     Advance care planning  -Pt's dtr Barbi Jean Claude is primary HCPOA #[phone].  -Previously completed POLST/ HCPOA paperwork in EPIC.    Palliative Performance Scale 40%    Emotion support provided to patient/family today: Yes    A total of 40 mins were spent on this consult, which included all of the following:direct face to face contact, history taking, physical examination, and >50% was spent counseling and coordinating care.  I will continue to follow clinically.    Thank you for allowing Palliative Care services to participate in the care of Ms. Margaret Silverio.     Ayush Cardenas MD  Palliative Care Services  [facility] Ph: [phone]

## 2024-07-19 NOTE — PROGRESS NOTES
[facility]  part of [facility]    Progress Note    Margaret Silverio Patient Status:  Inpatient    3/14/1945 MRN [id number]   Location [facility] 3W/SW Attending Shashi Sanchez MD   Hosp Day # 4 PCP Johnathon Rodriguez,          Subjective:     Constitutional:  Positive for fatigue. Negative for fever.   Respiratory: Negative.     Cardiovascular: Negative.    Musculoskeletal:  Positive for back pain and joint swelling.     Seen and examined  Comfortable on room air   No cp or sob or cough      Objective:   Blood pressure 109/62, pulse 72, temperature 98 °F (36.7 °C), temperature source Oral, resp. rate 16, weight 112 lb 11.2 oz (51.1 kg), SpO2 96%.  Physical Exam  Constitutional:       General: She is not in acute distress.     Appearance: She is ill-appearing.   HENT:      Head: Atraumatic.   Eyes:      General: No scleral icterus.  Cardiovascular:      Rate and Rhythm: Normal rate.      Heart sounds: Murmur heard.      No gallop.   Pulmonary:      Effort: Pulmonary effort is normal. No respiratory distress.      Breath sounds: No stridor. No wheezing, rhonchi or rales.   Abdominal:      General: Abdomen is flat.      Palpations: Abdomen is soft.   Musculoskeletal:      Cervical back: Normal range of motion.   Neurological:      Mental Status: She is oriented to person, place, and time.         Results:   Lab Results   Component Value Date    WBC 3.7 (L) 2024    HGB 9.7 (L) 2024    HCT 30.9 (L) 2024    .0 2024    CREATSERUM 2.00 (H) 2024    BUN 41 (H) 2024     2024    K 4.1 2024     2024    CO2 32.0 2024    GLU 82 2024    CA 9.6 2024    ALB 4.0 2024    ALKPHO 328 (H) 2024    BILT 0.8 2024    TP 7.7 2024    AST 23 2024    ALT 11 2024    PTT 35.2 2024    INR 1.25 (H) 2024    T4F 1.4 2024    TSH 7.534 (H) 2024    LIP 32  01/13/2024    ESRML 40 (H) 07/17/2024    CRP 9.00 (H) 07/17/2024    MG 1.9 07/19/2024    PHOS 3.8 07/01/2024    TROPHS 14 07/18/2024    B12 >2,000 (H) 07/01/2024       XR CHEST AP PORTABLE  (CPT=71045)    Result Date: 7/19/2024  CONCLUSION:  1. Stable moderate left and small chronic loculated right pleural effusions, probably related to CHF/fluid overload in this patient with cardiomegaly, a cardiac pacemaker and prosthetic mitral valve. 2. Stable atelectasis at the lung bases, left greater than right.    Dictated by (CST): Arsh Buenrostro MD on 7/19/2024 at 7:11 AM     Finalized by (CST): Arsh Buenrostro MD on 7/19/2024 at 7:15 AM         EKG 12 Lead    Result Date: 7/18/2024  Atrial-sensed ventricular-paced rhythm Abnormal ECG When compared with ECG of 15-JUL-2024 13:09, Vent. 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

## 2024-07-19 NOTE — PLAN OF CARE
Patient on dobutamine and bumex drip. Room air. PRN pain meds given as needed. X2 person stand pivot to bed. Fall precautions in place.    Problem: Patient Centered Care  Goal: Patient preferences are identified and integrated in the patient's plan of care  Description: Interventions:  - What would you like us to know as we care for you? I'm from home with my family  - Provide timely, complete, and accurate information to patient/family  - Incorporate patient and family knowledge, values, beliefs, and cultural backgrounds into the planning and delivery of care  - Encourage patient/family to participate in care and decision-making at the level they choose  - Honor patient and family perspectives and choices  Outcome: Progressing     Problem: Patient/Family Goals  Goal: Patient/Family Long Term Goal  Description: Patient's Long Term Goal: discharge home    Interventions:  - monitor vitals, labs, imaging  - cards, pulm on consult  - See additional Care Plan goals for specific interventions  Outcome: Progressing  Goal: Patient/Family Short Term Goal  Description: Patient's Short Term Goal: manage pain    Interventions:   - Frequent pain assessments  - PRN pain meds  - non-pharmacological interventions  - See additional Care Plan goals for specific interventions  Outcome: Progressing     Problem: CARDIOVASCULAR - ADULT  Goal: Maintains optimal cardiac output and hemodynamic stability  Description: INTERVENTIONS:  - Monitor vital signs, rhythm, and trends  - Monitor for bleeding, hypotension and signs of decreased cardiac output  - Evaluate effectiveness of vasoactive medications to optimize hemodynamic stability  - Monitor arterial and/or venous puncture sites for bleeding and/or hematoma  - Assess quality of pulses, skin color and temperature  - Assess for signs of decreased coronary artery perfusion - ex. Angina  - Evaluate fluid balance, assess for edema, trend weights  Outcome: Progressing  Goal: Absence of cardiac  arrhythmias or at baseline  Description: INTERVENTIONS:  - Continuous cardiac monitoring, monitor vital signs, obtain 12 lead EKG if indicated  - Evaluate effectiveness of antiarrhythmic and heart rate control medications as ordered  - Initiate emergency measures for life threatening arrhythmias  - Monitor electrolytes and administer replacement therapy as ordered  Outcome: Progressing     Problem: RESPIRATORY - ADULT  Goal: Achieves optimal ventilation and oxygenation  Description: INTERVENTIONS:  - Assess for changes in respiratory status  - Assess for changes in mentation and behavior  - Position to facilitate oxygenation and minimize respiratory effort  - Oxygen supplementation based on oxygen saturation or ABGs  - Provide Smoking Cessation handout, if applicable  - Encourage broncho-pulmonary hygiene including cough, deep breathe, Incentive Spirometry  - Assess the need for suctioning and perform as needed  - Assess and instruct to report SOB or any respiratory difficulty  - Respiratory Therapy support as indicated  - Manage/alleviate anxiety  - Monitor for signs/symptoms of CO2 retention  Outcome: Progressing     Problem: PAIN - ADULT  Goal: Verbalizes/displays adequate comfort level or patient's stated pain goal  Description: INTERVENTIONS:  - Encourage pt to monitor pain and request assistance  - Assess pain using appropriate pain scale  - Administer analgesics based on type and severity of pain and evaluate response  - Implement non-pharmacological measures as appropriate and evaluate response  - Consider cultural and social influences on pain and pain management  - Manage/alleviate anxiety  - Utilize distraction and/or relaxation techniques  - Monitor for opioid side effects  - Notify MD/LIP if interventions unsuccessful or patient reports new pain  - Anticipate increased pain with activity and pre-medicate as appropriate  Outcome: Progressing

## 2024-07-19 NOTE — PROGRESS NOTES
Progress Note     Margaret Silverio Patient Status:  Inpatient    3/14/1945 MRN [id number]   Location [facility] 3W/SW Attending Shashi Sanchez MD   Hosp Day # 4 PCP Johnathon Rodriguez DO     Chief Complaint:   Chief Complaint   Patient presents with    Shortness Of Breath         Subjective:   S: Patient seen and examined, chart reviewed.   In a lot of pain, head pain, not headache, escalating pain meds and not working.       Review of Systems:   10 point ROS completed and was negative, except for pertinent positive and negatives stated in subjective.                Objective:   Vital signs:  Temp:  [97.4 °F (36.3 °C)-98.8 °F (37.1 °C)] 97.9 °F (36.6 °C)  Pulse:  [69-78] 78  Resp:  [16-22] 20  BP: (109-142)/(59-75) 113/72  SpO2:  [91 %-96 %] 91 %    Wt Readings from Last 6 Encounters:   24 112 lb 11.2 oz (51.1 kg)   24 124 lb 12.8 oz (56.6 kg)   24 136 lb (61.7 kg)   24 129 lb 9.6 oz (58.8 kg)   24 134 lb (60.8 kg)   24 140 lb (63.5 kg)       Physical Exam:    General: acute distress 2/2 to pain  Respiratory: Clear to auscultation  Cardiovascular: iS1, S2. Regular rate and rhythm. No murmurs, rubs or gallops.   Abdomen: Soft, nontender, nondistended.  Positive bowel sounds. No rebound or guarding.  Neurologic: Gen weak, non focal  Musculoskeletal: pain with movement  EXTRE:  Rh Arth changes    Results:   Diagnostic Data:      Labs:    Labs Last 24 Hours:   BMP     CBC    Other     Na 137 Cl 100 BUN 41 Glu 82   Hb 9.7   PTT - Procal -   K 4.1 CO2 32.0 Cr 2.00   WBC 3.7 >< .0  INR - CRP -   Renal Lytes Endo    Hct 30.9   Trop - D dim -   eGFR - Ca 9.6 POC Gluc  -    LFT   pBNP - Lactic -   eGFR AA - PO4 - A1c -   AST - APk - Prot -  LDL -      Recent Labs   Lab 07/15/24  1455 24  0701 24  0646 24  0923 24  0650   RBC 2.97* 3.01* 2.82* 3.13* 3.07*   HGB 9.6* 9.6* 9.2* 10.0* 9.7*   HCT 30.0* 30.4* 28.4* 31.6* 30.9*   .0*  101.0* 100.7* 101.0* 100.7*   MCH 32.3 31.9 32.6 31.9 31.6   MCHC 32.0 31.6 32.4 31.6 31.4   RDW 20.0* 19.3* 19.2* 19.3* 19.1*   NEPRELIM 0.86* 0.76*  --   --  1.99   WBC 1.5* 1.8* 2.7* 4.5 3.7*   .0 209.0 181.0 189.0 156.0       Lab Results   Component Value Date    INR 1.25 (H) 02/09/2024    INR 1.60 (H) 01/15/2024    INR 2.60 (H) 01/14/2024       Recent Labs   Lab 07/17/24  0646 07/18/24  0923 07/19/24  0650   GLU 79 106* 82   BUN 44* 40* 41*   CREATSERUM 1.89* 1.80* 2.00*   CA 8.8 9.6 9.6    138 137   K 4.1 5.0 4.1    101 100   CO2 29.0 31.0 32.0       No results for input(s): \"HIWOT\", \"LIP\" in the last 168 hours.    Recent Labs   Lab 07/16/24  0701 07/17/24  0646 07/18/24  0923 07/19/24  0650   ESRML  --  40*  --   --    CRP  --  9.00*  --   --    MG 2.2  --  1.9 1.9       No results for input(s): \"URINE\", \"CULTI\", \"BLDSMR\" in the last 168 hours.      XR CHEST AP PORTABLE  (CPT=71045)    Result Date: 7/19/2024  CONCLUSION:  1. Stable moderate left and small chronic loculated right pleural effusions, probably related to CHF/fluid overload in this patient with cardiomegaly, a cardiac pacemaker and prosthetic mitral valve. 2. Stable atelectasis at the lung bases, left greater than right.    Dictated by (CST): Arsh Buenrostro MD on 7/19/2024 at 7:11 AM     Finalized by (CST): Arsh Buenrostro MD on 7/19/2024 at 7:15 AM               Pro-Calcitonin  No results for input(s): \"PCT\" in the last 168 hours.    Cardiac  No results for input(s): \"TROP\", \"PBNP\" in the last 168 hours.    Imaging: Imaging data reviewed in [facility].    XR CHEST AP PORTABLE  (CPT=71045)    Result Date: 7/19/2024  CONCLUSION:  1. Stable moderate left and small chronic loculated right pleural effusions, probably related to CHF/fluid overload in this patient with cardiomegaly, a cardiac pacemaker and prosthetic mitral valve. 2. Stable atelectasis at the lung bases, left greater than right.    Dictated by (CST): Arsh Buenrostro,  MD on 7/19/2024 at 7:11 AM     Finalized by (CST): Arsh Buenrostro MD on 7/19/2024 at 7:15 AM              Medications:    morphINE ER  15 mg Oral 2 times per day    senna-docusate  2 tablet Oral Daily    midodrine  5 mg Oral BID AC    heparin  5,000 Units Subcutaneous 2 times per day    amiodarone  200 mg Oral Daily    carvedilol  3.125 mg Oral BID with meals    folic acid  800 mcg Oral Daily    gabapentin  300 mg Oral TID    isosorbide dinitrate  20 mg Oral TID (Nitrates)    pantoprazole  40 mg Oral BID AC       Assessment & Plan:   ASSESSMENT / PLAN:     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   Dobutamine gtt 5 mcg/kg/min  Bumex 1mg/hr gtt.   S/p thoracentesis with 1L transudative fluid removed 7/16    GDMT: Hold home farxiga, aldactone, coreg, entresto due to hypotension.   LH 2022 - normal coronaries.   S/p RHC 7/17 showed severe volume overload   Cards on consult  I/O, daily weights.   ECHO LVEF 30-35% dyskinesis of anterior walls. RV size increased  Large L pleural effusion   Hypoxia   S/p thoracentesis 1L taken off. transudate  Pulm on consult.   Was recently placed on o2 at home on 7/9 2L continuous prior to this not on home o2  Has h/o RA. Check ESR/CRP if elevatated will consider prednisone. Elevated - will try medrol dose pack  H/o paroxysmal A.fib   AV paced.   H/o BRANDYN occlusion   Not on A/C due to h/o GI bleed.   Amiodarone   Leukopenia   WBC 1.5   Hold methotrexate  Rpt labs in AM.   GEORGE on CKD IV   Baseline creat 1.4 -1.6 per EMR.   BUN/creat trending down with diuresis.   Rpt labs in AM. Improved  Hypotension   Midodrine 2.5mg BID  Hold isordil for sBP < 100   Acute on chronic neck pain with radiculopathy  Multilevel spondylosis C3-5  Was seen by neurosurgery not a surgical candidate.   Pain control.   Palliative care consult   Pain consult  Cont gabapentin 300mg TID, cont norco PRN, will try adding oral morphine and see if this will achieve  better pain control  Recently has GIUSEPPE outpt   Other medical problems  Severe RA with multiple joint deformities      35 minutes of critical care time spent reviewing chart, adjusting medications, monitoring patient on IV dobutamine and IV bumex drip, discussing with team      dvt prophylaxis: sc heparin  code status: DNR  dispo: to be determined    I personally reviewed the available laboratories. I discussed/will discuss the case with patient nurse and pain. I ordered laboratories, studies including a.m. labs. I adjusted medications including pain meds. Medical decision making high, risk is high.    >55min spent, >50% spent counseling and coordinating care in the form of educating pt/family and d/w consultants and staff. Most of the time spent discussing the above plan.     Shashi Sanchez MD, FAAP, FACP  [facility] Hospitalist  I respond to [employer] Chat and [employer] Connect

## 2024-07-19 NOTE — PROGRESS NOTES
[facility] Progress Note    Margaret Silverio Patient Status:  Inpatient    3/14/1945 MRN [id number]   Location [facility] 3W/SW Attending Katiana Ochoa MD   Hosp Day # 4 PCP Johnathon Rodriguez DO       CARDIOLOGIST ADDENDUM:    I agree with the findings below.  I have personally performed the Assessment and Plan in its entirety, as detailed below:    Acute on chronic biventricular HFrEF  - Nonischemic  -  S/p left thoracentesis w/ 1 L removed  -  RHC - RV 38/19, Mean RA 22 mmHg, Wedge 16 mmHg, PA 47/21. CI 2.2. Evidence of congestion & R sided vent failure.   - Continue bumex gtt 1mg/hr  - Continue dobutamine gtt 5 mcg/kg/min  - Continue midodrine  - Hypotension limits use of other HF meds, monitor and titrate rx as able  - Monitor creat, lytes, I/O, weights    Parox AF/AFL  - Hx of L atrial appendage occlusion, anticoag not indicated  - Continue amiodarone.    Colt Montero M.D.   Cardiology/Electrophysiology   2024  L3  -----------------------------------------      Subjective:  Reports chronic pain to the back of her neck/head  No CV concerns     Objective:  /62 (BP Location: Radial)   Pulse 72   Temp 98 °F (36.7 °C) (Oral)   Resp 16   Wt 112 lb 11.2 oz (51.1 kg)   SpO2 96%   BMI 19.34 kg/m²     Telemetry: V paced      Intake/Output:    Intake/Output Summary (Last 24 hours) at 2024 0949  Last data filed at 2024 0450  Gross per 24 hour   Intake 422.71 ml   Output 800 ml   Net -377.29 ml       Last 3 Weights   24 0450 112 lb 11.2 oz (51.1 kg)   24 0458 113 lb 9.6 oz (51.5 kg)   24 0508 117 lb 14.4 oz (53.5 kg)   24 0614 123 lb 12.8 oz (56.2 kg)   07/15/24 1830 124 lb 1.6 oz (56.3 kg)   24 1505 124 lb 12.8 oz (56.6 kg)   24 1501 136 lb (61.7 kg)       Labs:  Recent Labs   Lab 24  0646 24  0923 24  0650   GLU 79 106* 82   BUN 44* 40* 41*   CREATSERUM 1.89* 1.80* 2.00*   EGFRCR 27* 28*  25*   CA 8.8 9.6 9.6    138 137   K 4.1 5.0 4.1    101 100   CO2 29.0 31.0 32.0     Recent Labs   Lab 07/15/24  1455 07/16/24  0701 07/17/24  0646 07/18/24  0923 07/19/24  0650   RBC 2.97* 3.01* 2.82* 3.13* 3.07*   HGB 9.6* 9.6* 9.2* 10.0* 9.7*   HCT 30.0* 30.4* 28.4* 31.6* 30.9*   .0* 101.0* 100.7* 101.0* 100.7*   MCH 32.3 31.9 32.6 31.9 31.6   MCHC 32.0 31.6 32.4 31.6 31.4   RDW 20.0* 19.3* 19.2* 19.3* 19.1*   NEPRELIM 0.86* 0.76*  --   --  1.99   WBC 1.5* 1.8* 2.7* 4.5 3.7*   .0 209.0 181.0 189.0 156.0         Recent Labs   Lab 07/18/24  1137   TROPHS 14       Diagnostics:     7/17/24 RHC   Procedures performed:  Right heart catheterization  Initial plan was to perform via right IJ approach to leave in [place] patient refused to have a knee intervention through her neck then switched to the groin approach.     Right heart catheterization was performed which showed:  RV 38/19  Mean RA 22 mmHg  Wedge 16 mmHg  PA 47/21.     AO saturation 100%, PA saturation 56.8  Cardiac output 3.5 L/min, cardiac index 2.2.     Unable to advance CCO [place] into the PA.  Right heart catheterization was performed without leaving S1.     Complications none     Summary fluid overload with evidence of congestion.  Right-sided ventricular failure.  Continue Bumex drip at 1 mg an hour.    Review of Systems   Respiratory: Negative.     Cardiovascular: Negative.      Physical Exam:    General: Alert and oriented x 3. Chronically ill appearing   HEENT: Normocephalic, anicteric sclera, neck supple, no thyromegaly or adenopathy. + Pain to the back of her head / neck   Neck: No JVD, carotids 2+, no bruits.  Cardiac: Regular rate & rhythm. S1, S2 normal. No murmur, pericardial rub, S3, or extra cardiac sounds.  Lungs: Clear without wheezes, rales, rhonchi or dullness.  Normal excursions and effort.  Abdomen: Soft, non-tender. No organosplenomegally, mass or rebound, BS-present.  Extremities: Without clubbing or cyanosis.  No  left lower extremity edema, no right lower extremity edema.  Neurologic: Alert and oriented, normal affect. No focal defects  Skin: Warm and dry.       Medications:   morphINE ER  15 mg Oral 2 times per day    senna-docusate  2 tablet Oral Daily    midodrine  5 mg Oral BID AC    heparin  5,000 Units Subcutaneous 2 times per day    amiodarone  200 mg Oral Daily    [Held by provider] carvedilol  3.125 mg Oral BID with meals    folic acid  800 mcg Oral Daily    gabapentin  300 mg Oral TID    isosorbide dinitrate  20 mg Oral TID (Nitrates)    pantoprazole  40 mg Oral BID AC      bumetanide (Bumex) 12.5 mg in 50 mL infusion 1 mg/hr (07/19/24 0155)    DOBUTamine 5 mcg/kg/min (07/19/24 0155)       Assessment:    Nonischemic severe biventricular failure, s/p DC ICD    - Normal coronaries on [facility] in Oct '22  - 7/15 CXR - mod-large left pleural effusion noted, progressive pulm edema   - 7/16 S/p left thoracentesis w/ 1 L removed  - 7/17 RHC - RV 38/19, Mean RA 22 mmHg, Wedge 16 mmHg, PA 47/21. CI 2.2. Evidence of congestion & R sided vent failure. Started on bumex gtt 1mg/hr  - On dobutamine gtt 5 mcg/kg/min   - GDMT : historically on farxiga. Aldactone, & entresto held due to hypotension. On isordil     Hx of paroxysmal atrial flutter/fib  - AV paced  - Hx of L atrial appendage occlusion  - On amiodarone. Coreg on hold while on dobutamine gtt     Hx of surgical mitral valve replacement    GEORGE on CKD 4  - Improving w/ diuresis     Hypotension   - BP stable on midodrine     Hx of severe GI bleeding this year   Severe rheumatoid arthritis  Leukopenia  Acute on chronic neck pain w/ radiculopathy  Multilevel spondylosis C3-5     Plan:    Scr stable. Continue bumex gtt, dobs gtt, isordil   Optimize GDMT as bp & renal fx permits   Per documentation, UOP seems to be slowing down. Will monitor closely. RN notified. Will d/w rounding MD   Monitor accurate I/o , daily wts & renal fx closely   Pain management per PMD   Appreciate  palliative care input as overall prognosis is poor     YOLIE Bucio  7/19/2024  10:00 AM  Ph [phone] ([facility])  Ph [phone] ([place])

## 2024-07-20 LAB
ANION GAP SERPL CALC-SCNC: 4 MMOL/L (ref 0–18)
BASOPHILS # BLD AUTO: 0.06 X10(3) UL (ref 0–0.2)
BASOPHILS NFR BLD AUTO: 1.4 %
BUN BLD-MCNC: 44 MG/DL (ref 9–23)
BUN/CREAT SERPL: 20 (ref 10–20)
CALCIUM BLD-MCNC: 9.2 MG/DL (ref 8.7–10.4)
CHLORIDE SERPL-SCNC: 102 MMOL/L (ref 98–112)
CO2 SERPL-SCNC: 30 MMOL/L (ref 21–32)
CREAT BLD-MCNC: 2.2 MG/DL
DEPRECATED RDW RBC AUTO: 64.8 FL (ref 35.1–46.3)
EGFRCR SERPLBLD CKD-EPI 2021: 22 ML/MIN/1.73M2 (ref 60–?)
EOSINOPHIL # BLD AUTO: 0.1 X10(3) UL (ref 0–0.7)
EOSINOPHIL NFR BLD AUTO: 2.3 %
ERYTHROCYTE [DISTWIDTH] IN BLOOD BY AUTOMATED COUNT: 18.9 % (ref 11–15)
GLUCOSE BLD-MCNC: 94 MG/DL (ref 70–99)
HCT VFR BLD AUTO: 30.1 %
HGB BLD-MCNC: 9.4 G/DL
IMM GRANULOCYTES # BLD AUTO: 0.06 X10(3) UL (ref 0–1)
IMM GRANULOCYTES NFR BLD: 1.4 %
LYMPHOCYTES # BLD AUTO: 0.98 X10(3) UL (ref 1–4)
LYMPHOCYTES NFR BLD AUTO: 23 %
MAGNESIUM SERPL-MCNC: 2 MG/DL (ref 1.6–2.6)
MCH RBC QN AUTO: 31.4 PG (ref 26–34)
MCHC RBC AUTO-ENTMCNC: 31.2 G/DL (ref 31–37)
MCV RBC AUTO: 100.7 FL
MONOCYTES # BLD AUTO: 0.78 X10(3) UL (ref 0.1–1)
MONOCYTES NFR BLD AUTO: 18.3 %
NEUTROPHILS # BLD AUTO: 2.28 X10 (3) UL (ref 1.5–7.7)
NEUTROPHILS # BLD AUTO: 2.28 X10(3) UL (ref 1.5–7.7)
NEUTROPHILS NFR BLD AUTO: 53.6 %
OSMOLALITY SERPL CALC.SUM OF ELEC: 293 MOSM/KG (ref 275–295)
PLATELET # BLD AUTO: 168 10(3)UL (ref 150–450)
POTASSIUM SERPL-SCNC: 4.5 MMOL/L (ref 3.5–5.1)
RBC # BLD AUTO: 2.99 X10(6)UL
SODIUM SERPL-SCNC: 136 MMOL/L (ref 136–145)
WBC # BLD AUTO: 4.3 X10(3) UL (ref 4–11)

## 2024-07-20 PROCEDURE — 99233 SBSQ HOSP IP/OBS HIGH 50: CPT | Performed by: INTERNAL MEDICINE

## 2024-07-20 RX ORDER — PREDNISONE 20 MG/1
40 TABLET ORAL
Status: DISCONTINUED | OUTPATIENT
Start: 2024-07-21 | End: 2024-07-21

## 2024-07-20 RX ORDER — FENTANYL 12.5 UG/1
1 PATCH TRANSDERMAL
Status: DISCONTINUED | OUTPATIENT
Start: 2024-07-20 | End: 2024-07-22

## 2024-07-20 NOTE — PROGRESS NOTES
Progress Note     Margaret Silverio Patient Status:  Inpatient    3/14/1945 MRN [id number]   Location [facility] 3W/SW Attending Shashi Sanchez MD   Hosp Day # 5 PCP Johnathon Rodriguez DO     Chief Complaint:   Chief Complaint   Patient presents with    Shortness Of Breath         Subjective:   S: Patient seen and examined, chart reviewed.   Still having terrible head pain.  Cards lowered dobutamine drip.       Review of Systems:   10 point ROS completed and was negative, except for pertinent positive and negatives stated in subjective.                Objective:   Vital signs:  Temp:  [97.9 °F (36.6 °C)-98.7 °F (37.1 °C)] 98.7 °F (37.1 °C)  Pulse:  [73-81] 76  Resp:  [16-22] 16  BP: (112-148)/(61-77) 131/63  SpO2:  [91 %-98 %] 93 %    Wt Readings from Last 6 Encounters:   24 112 lb 11.2 oz (51.1 kg)   24 124 lb 12.8 oz (56.6 kg)   24 136 lb (61.7 kg)   24 129 lb 9.6 oz (58.8 kg)   24 134 lb (60.8 kg)   24 140 lb (63.5 kg)       Physical Exam:    General: No acute distress.   Respiratory: Clear to auscultation bilaterally. No wheezes. No rhonchi.  Cardiovascular: S1, S2. Regular rate and rhythm. No murmurs, rubs or gallops.   Abdomen: Soft, nontender, nondistended.  Positive bowel sounds. No rebound or guarding.  Neurologic: No focal neurological deficits.   Musculoskeletal: Moves all extremities.  Extremities: 1+ edema.    Results:   Diagnostic Data:      Labs:    Labs Last 24 Hours:   BMP     CBC    Other     Na 136 Cl 102 BUN 44 Glu 94   Hb 9.4   PTT - Procal -   K 4.5 CO2 30.0 Cr 2.20   WBC 4.3 >< .0  INR - CRP -   Renal Lytes Endo    Hct 30.1   Trop - D dim -   eGFR - Ca 9.2 POC Gluc  -    LFT   pBNP - Lactic -   eGFR AA - PO4 - A1c -   AST - APk - Prot -  LDL -      Recent Labs   Lab 24  0701 24  0646 24  0923 24  0650 24  0532   RBC 3.01*   < > 3.13* 3.07* 2.99*   HGB 9.6*   < > 10.0* 9.7* 9.4*   HCT 30.4*   < >  31.6* 30.9* 30.1*   .0*   < > 101.0* 100.7* 100.7*   MCH 31.9   < > 31.9 31.6 31.4   MCHC 31.6   < > 31.6 31.4 31.2   RDW 19.3*   < > 19.3* 19.1* 18.9*   NEPRELIM 0.76*  --   --  1.99 2.28   WBC 1.8*   < > 4.5 3.7* 4.3   .0   < > 189.0 156.0 168.0    < > = values in this interval not displayed.       Lab Results   Component Value Date    INR 1.25 (H) 02/09/2024    INR 1.60 (H) 01/15/2024    INR 2.60 (H) 01/14/2024       Recent Labs   Lab 07/18/24  0923 07/19/24  0650 07/20/24  0532   * 82 94   BUN 40* 41* 44*   CREATSERUM 1.80* 2.00* 2.20*   CA 9.6 9.6 9.2    137 136   K 5.0 4.1 4.5    100 102   CO2 31.0 32.0 30.0       No results for input(s): \"HIWOT\", \"LIP\" in the last 168 hours.    Recent Labs   Lab 07/17/24  0646 07/18/24  0923 07/19/24  0650 07/20/24  0532   ESRML 40*  --   --   --    CRP 9.00*  --   --   --    MG  --  1.9 1.9 2.0       No results for input(s): \"URINE\", \"CULTI\", \"BLDSMR\" in the last 168 hours.      XR CHEST AP PORTABLE  (CPT=71045)    Result Date: 7/19/2024  CONCLUSION:  1. Stable moderate left and small chronic loculated right pleural effusions, probably related to CHF/fluid overload in this patient with cardiomegaly, a cardiac pacemaker and prosthetic mitral valve. 2. Stable atelectasis at the lung bases, left greater than right.    Dictated by (CST): Arsh Buenrostro MD on 7/19/2024 at 7:11 AM     Finalized by (CST): Arsh Buenrostro MD on 7/19/2024 at 7:15 AM               Pro-Calcitonin  No results for input(s): \"PCT\" in the last 168 hours.    Cardiac  No results for input(s): \"TROP\", \"PBNP\" in the last 168 hours.    Imaging: Imaging data reviewed in Epic.    XR CHEST AP PORTABLE  (CPT=71045)    Result Date: 7/19/2024  CONCLUSION:  1. Stable moderate left and small chronic loculated right pleural effusions, probably related to CHF/fluid overload in this patient with cardiomegaly, a cardiac pacemaker and prosthetic mitral valve. 2. Stable atelectasis at  the lung bases, left greater than right.    Dictated by (CST): Arsh Buenrostro MD on 7/19/2024 at 7:11 AM     Finalized by (CST): Arsh Buenrostro MD on 7/19/2024 at 7:15 AM              Medications:    morphINE ER  15 mg Oral 2 times per day    senna-docusate  2 tablet Oral Daily    midodrine  5 mg Oral BID AC    heparin  5,000 Units Subcutaneous 2 times per day    amiodarone  200 mg Oral Daily    carvedilol  3.125 mg Oral BID with meals    folic acid  800 mcg Oral Daily    gabapentin  300 mg Oral TID    isosorbide dinitrate  20 mg Oral TID (Nitrates)    pantoprazole  40 mg Oral BID AC       Assessment & Plan:   ASSESSMENT / PLAN:     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   Dobutamine gtt lowered to 2.5 mcg/kg/min  Bumex decr to 0.5mg/hr gtt.   S/p thoracentesis with 1L transudative fluid removed 7/16    GDMT: Hold home farxiga, aldactone, coreg, entresto due to hypotension.   LHC 2022 - normal coronaries.   S/p RHC 7/17 showed severe volume overload   Cards on consult  I/O, daily weights.   ECHO LVEF 30-35% dyskinesis of anterior walls. RV size increased  Large L pleural effusion   Hypoxia   S/p thoracentesis 1L taken off. transudate  Pulm on consult.   Was recently placed on o2 at home on 7/9 2L continuous prior to this not on home o2  RA:  ESR elev at 40, CRP elevatated at 9.  Will start prednisone 40 mg daily and d/w on medrol dose pack  H/o paroxysmal A.fib   AV paced.   H/o BRANDYN occlusion   Not on A/C due to h/o GI bleed.   Amiodarone   Leukopenia   WBC 1.5   Hold methotrexate  Rpt labs in AM.   GEORGE on CKD IV   Baseline creat 1.4 -1.6 per EMR.   BUN/creat trending down with diuresis.   Rpt labs in AM. Getting worse so diuretics and dobut decreased  Hypotension   Midodrine 2.5mg BID  Hold isordil for sBP < 100   Acute on chronic neck pain with radiculopathy  Multilevel spondylosis C3-5  Was seen by neurosurgery not a surgical candidate.   Pain control  with MS Contin not working.  Will add duragesic patch  Palliative care consult   Pain consult  Cont gabapentin 300mg TID, cont norco PRN, will try adding oral morphine and see if this will achieve better pain control  Recently has GIUSEPPE outpt   Other medical problems  Severe RA with multiple joint deformities      35 minutes of critical care time spent reviewing chart, adjusting medications, monitoring patient on IV dobutamine and IV bumex drip, discussing with team        dvt prophylaxis: sc heparin  code status: DNR  dispo: to be determined     I personally reviewed the available laboratories. I discussed/will discuss the case with patient nurse and pain. I ordered laboratories, studies including a.m. labs. I adjusted medications including pain meds. Medical decision making high, risk is high.     >55min spent, >50% spent counseling and coordinating care in the form of educating pt/family and d/w consultants and staff. Most of the time spent discussing the above plan.      Plan of care discussed with patient,  and nurse    Shashi Sanchez MD, FAAP, FACP  [facility] Hospitalist  I respond to [employer] Chat and AMS Connect

## 2024-07-20 NOTE — PLAN OF CARE
Bed locked in low position, belongings in reach, bed alarm on, call light in reach. Frequent rounding done, all patient needs met. Non-slip socks on/at bedside. Bumex GTT continues at 4ml/hr, dobutamine GTT continues at 8ml/hr. Pain continues to be an issue for her, meds given, see MAR. Pain on consult.    Problem: CARDIOVASCULAR - ADULT  Goal: Maintains optimal cardiac output and hemodynamic stability  Description: INTERVENTIONS:  - Monitor vital signs, rhythm, and trends  - Monitor for bleeding, hypotension and signs of decreased cardiac output  - Evaluate effectiveness of vasoactive medications to optimize hemodynamic stability  - Monitor arterial and/or venous puncture sites for bleeding and/or hematoma  - Assess quality of pulses, skin color and temperature  - Assess for signs of decreased coronary artery perfusion - ex. Angina  - Evaluate fluid balance, assess for edema, trend weights  Outcome: Progressing  Goal: Absence of cardiac arrhythmias or at baseline  Description: INTERVENTIONS:  - Continuous cardiac monitoring, monitor vital signs, obtain 12 lead EKG if indicated  - Evaluate effectiveness of antiarrhythmic and heart rate control medications as ordered  - Initiate emergency measures for life threatening arrhythmias  - Monitor electrolytes and administer replacement therapy as ordered  Outcome: Progressing     Problem: RESPIRATORY - ADULT  Goal: Achieves optimal ventilation and oxygenation  Description: INTERVENTIONS:  - Assess for changes in respiratory status  - Assess for changes in mentation and behavior  - Position to facilitate oxygenation and minimize respiratory effort  - Oxygen supplementation based on oxygen saturation or ABGs  - Provide Smoking Cessation handout, if applicable  - Encourage broncho-pulmonary hygiene including cough, deep breathe, Incentive Spirometry  - Assess the need for suctioning and perform as needed  - Assess and instruct to report SOB or any respiratory difficulty  -  Respiratory Therapy support as indicated  - Manage/alleviate anxiety  - Monitor for signs/symptoms of CO2 retention  Outcome: Progressing     Problem: PAIN - ADULT  Goal: Verbalizes/displays adequate comfort level or patient's stated pain goal  Description: INTERVENTIONS:  - Encourage pt to monitor pain and request assistance  - Assess pain using appropriate pain scale  - Administer analgesics based on type and severity of pain and evaluate response  - Implement non-pharmacological measures as appropriate and evaluate response  - Consider cultural and social influences on pain and pain management  - Manage/alleviate anxiety  - Utilize distraction and/or relaxation techniques  - Monitor for opioid side effects  - Notify MD/LIP if interventions unsuccessful or patient reports new pain  - Anticipate increased pain with activity and pre-medicate as appropriate  Outcome: Progressing

## 2024-07-20 NOTE — PROGRESS NOTES
[facility] Cardiology Progress Note    Margaret Silverio Patient Status:  Inpatient    3/14/1945 MRN [id number]   Location [facility] 3W/SW Attending Katiana Ochoa MD   Hosp Day # 5 PCP Johnathon Rodriguez,          Subjective:  Denies any new symptoms, complaining of hand pain    Objective:  /68 (BP Location: Radial)   Pulse 75   Temp 98.2 °F (36.8 °C) (Oral)   Resp 16   Wt 112 lb 11.2 oz (51.1 kg)   SpO2 98%   BMI 19.34 kg/m²     Telemetry: V paced      Intake/Output:    Intake/Output Summary (Last 24 hours) at 2024 0859  Last data filed at 2024 2300  Gross per 24 hour   Intake 542.5 ml   Output 500 ml   Net 42.5 ml       Last 3 Weights   24 0450 112 lb 11.2 oz (51.1 kg)   24 0458 113 lb 9.6 oz (51.5 kg)   24 0508 117 lb 14.4 oz (53.5 kg)   24 0614 123 lb 12.8 oz (56.2 kg)   07/15/24 1830 124 lb 1.6 oz (56.3 kg)   24 1505 124 lb 12.8 oz (56.6 kg)   24 1501 136 lb (61.7 kg)       Labs:  Recent Labs   Lab 24  0923 24  0650 24  0532   * 82 94   BUN 40* 41* 44*   CREATSERUM 1.80* 2.00* 2.20*   EGFRCR 28* 25* 22*   CA 9.6 9.6 9.2    137 136   K 5.0 4.1 4.5    100 102   CO2 31.0 32.0 30.0     Recent Labs   Lab 24  0701 24  0646 24  0923 24  0650 24  0532   RBC 3.01*   < > 3.13* 3.07* 2.99*   HGB 9.6*   < > 10.0* 9.7* 9.4*   HCT 30.4*   < > 31.6* 30.9* 30.1*   .0*   < > 101.0* 100.7* 100.7*   MCH 31.9   < > 31.9 31.6 31.4   MCHC 31.6   < > 31.6 31.4 31.2   RDW 19.3*   < > 19.3* 19.1* 18.9*   NEPRELIM 0.76*  --   --  1.99 2.28   WBC 1.8*   < > 4.5 3.7* 4.3   .0   < > 189.0 156.0 168.0    < > = values in this interval not displayed.         Recent Labs   Lab 24  1137   TROPHS 14       Diagnostics:     24 RHC   Procedures performed:  Right heart catheterization  Initial plan was to perform via right IJ approach to leave in [place] patient refused  to have a knee intervention through her neck then switched to the groin approach.     Right heart catheterization was performed which showed:  RV 38/19  Mean RA 22 mmHg  Wedge 16 mmHg  PA 47/21.     AO saturation 100%, PA saturation 56.8  Cardiac output 3.5 L/min, cardiac index 2.2.     Unable to advance CCO [place] into the PA.  Right heart catheterization was performed without leaving S1.     Complications none     Summary fluid overload with evidence of congestion.  Right-sided ventricular failure.  Continue Bumex drip at 1 mg an hour.    Review of Systems   Respiratory: Negative.     Cardiovascular: Negative.      Physical Exam:    General: Alert and oriented x 3. Chronically ill appearing   HEENT: Normocephalic, anicteric sclera, neck supple, no thyromegaly or adenopathy. + Pain to the back of her head / neck   Neck: No JVD, carotids 2+, no bruits.  Cardiac: Regular rate & rhythm. S1, S2 normal. No murmur, pericardial rub, S3, or extra cardiac sounds.  Lungs: Clear without wheezes, rales, rhonchi or dullness.  Normal excursions and effort.  Abdomen: Soft, non-tender. No organosplenomegally, mass or rebound, BS-present.  Extremities: Without clubbing or cyanosis.  No left lower extremity edema, no right lower extremity edema.  Neurologic: Alert and oriented, normal affect. No focal defects  Skin: Warm and dry.       Medications:   morphINE ER  15 mg Oral 2 times per day    senna-docusate  2 tablet Oral Daily    midodrine  5 mg Oral BID AC    heparin  5,000 Units Subcutaneous 2 times per day    amiodarone  200 mg Oral Daily    carvedilol  3.125 mg Oral BID with meals    folic acid  800 mcg Oral Daily    gabapentin  300 mg Oral TID    isosorbide dinitrate  20 mg Oral TID (Nitrates)    pantoprazole  40 mg Oral BID AC      bumetanide (Bumex) 12.5 mg in 50 mL infusion 1 mg/hr (07/20/24 0328)    DOBUTamine 5 mcg/kg/min (07/20/24 0787)       Assessment:    Nonischemic severe biventricular failure, s/p DC ICD    - Normal  coronaries on [facility] in Oct '22  - 7/15 CXR - mod-large left pleural effusion noted, progressive pulm edema   - 7/16 S/p left thoracentesis w/ 1 L removed  - 7/17 RHC - RV 38/19, Mean RA 22 mmHg, Wedge 16 mmHg, PA 47/21. CI 2.2. Evidence of congestion & R sided vent failure. Started on bumex gtt 1mg/hr  - GDMT : historically on farxiga. Aldactone, & entresto held due to hypotension. On isordil     Hx of paroxysmal atrial flutter/fib  - AV paced  - Hx of L atrial appendage occlusion  - On amiodarone. 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      Ricco Encinas MD        3

## 2024-07-20 NOTE — PLAN OF CARE

## 2024-07-21 ENCOUNTER — APPOINTMENT (OUTPATIENT)
Dept: GENERAL RADIOLOGY | Facility: HOSPITAL | Age: 79
End: 2024-07-21
Attending: INTERNAL MEDICINE
Payer: MEDICARE

## 2024-07-21 PROCEDURE — 99233 SBSQ HOSP IP/OBS HIGH 50: CPT | Performed by: INTERNAL MEDICINE

## 2024-07-21 PROCEDURE — 71045 X-RAY EXAM CHEST 1 VIEW: CPT | Performed by: INTERNAL MEDICINE

## 2024-07-21 RX ORDER — PREDNISONE 20 MG/1
20 TABLET ORAL
Status: DISCONTINUED | OUTPATIENT
Start: 2024-07-22 | End: 2024-07-22

## 2024-07-21 RX ORDER — BUMETANIDE 1 MG/1
1 TABLET ORAL
Status: DISCONTINUED | OUTPATIENT
Start: 2024-07-21 | End: 2024-07-22

## 2024-07-21 RX ORDER — METHOTREXATE 2.5 MG/1
15 TABLET ORAL WEEKLY
Status: DISCONTINUED | OUTPATIENT
Start: 2024-07-21 | End: 2024-07-22

## 2024-07-21 NOTE — PROGRESS NOTES
[facility] Cardiology Progress Note    Margaret Silverio Patient Status:  Inpatient    3/14/1945 MRN [id number]   Location [facility] 3W/SW Attending Katiana Ochoa MD   Hosp Day # 6 PCP Johnathon Rodriguez,          Subjective:  Denies any new symptoms, complaining of hand pain    Objective:  /73 (BP Location: Radial)   Pulse 72   Temp 98.9 °F (37.2 °C) (Oral)   Resp 18   Wt 109 lb 6.4 oz (49.6 kg)   SpO2 95%   BMI 18.78 kg/m²     Telemetry: V paced      Intake/Output:    Intake/Output Summary (Last 24 hours) at 2024 1023  Last data filed at 2024 0913  Gross per 24 hour   Intake 76.47 ml   Output 1450 ml   Net -1373.53 ml       Last 3 Weights   24 0620 109 lb 6.4 oz (49.6 kg)   24 0450 112 lb 11.2 oz (51.1 kg)   24 0458 113 lb 9.6 oz (51.5 kg)   24 0508 117 lb 14.4 oz (53.5 kg)   24 0614 123 lb 12.8 oz (56.2 kg)   07/15/24 1830 124 lb 1.6 oz (56.3 kg)   24 1505 124 lb 12.8 oz (56.6 kg)   24 1501 136 lb (61.7 kg)       Labs:  Recent Labs   Lab 24  0923 24  0650 24  0532   * 82 94   BUN 40* 41* 44*   CREATSERUM 1.80* 2.00* 2.20*   EGFRCR 28* 25* 22*   CA 9.6 9.6 9.2    137 136   K 5.0 4.1 4.5    100 102   CO2 31.0 32.0 30.0     Recent Labs   Lab 24  0701 24  0646 24  0923 24  0650 24  0532   RBC 3.01*   < > 3.13* 3.07* 2.99*   HGB 9.6*   < > 10.0* 9.7* 9.4*   HCT 30.4*   < > 31.6* 30.9* 30.1*   .0*   < > 101.0* 100.7* 100.7*   MCH 31.9   < > 31.9 31.6 31.4   MCHC 31.6   < > 31.6 31.4 31.2   RDW 19.3*   < > 19.3* 19.1* 18.9*   NEPRELIM 0.76*  --   --  1.99 2.28   WBC 1.8*   < > 4.5 3.7* 4.3   .0   < > 189.0 156.0 168.0    < > = values in this interval not displayed.         Recent Labs   Lab 24  1137   TROPHS 14       Diagnostics:     24 RHC   Procedures performed:  Right heart catheterization  Initial plan was to perform via right IJ  approach to leave in [place] patient refused to have a knee intervention through her neck then switched to the groin approach.     Right heart catheterization was performed which showed:  RV 38/19  Mean RA 22 mmHg  Wedge 16 mmHg  PA 47/21.     AO saturation 100%, PA saturation 56.8  Cardiac output 3.5 L/min, cardiac index 2.2.     Unable to advance CCO [place] into the PA.  Right heart catheterization was performed without leaving S1.     Complications none     Summary fluid overload with evidence of congestion.  Right-sided ventricular failure.  Continue Bumex drip at 1 mg an hour.    Review of Systems   Respiratory: Negative.     Cardiovascular: Negative.      Physical Exam:    General: Alert and oriented x 3. Chronically ill appearing   HEENT: Normocephalic, anicteric sclera, neck supple, no thyromegaly or adenopathy. + Pain to the back of her head / neck   Neck: No JVD, carotids 2+, no bruits.  Cardiac: Regular rate & rhythm. S1, S2 normal. No murmur, pericardial rub, S3, or extra cardiac sounds.  Lungs: Clear without wheezes, rales, rhonchi or dullness.  Normal excursions and effort.  Abdomen: Soft, non-tender. No organosplenomegally, mass or rebound, BS-present.  Extremities: Without clubbing or cyanosis.  No left lower extremity edema, no right lower extremity edema.  Neurologic: Alert and oriented, normal affect. No focal defects  Skin: Warm and dry.       Medications:   bumetanide  1 mg Oral BID (Diuretic)    fentaNYL  1 patch Transdermal Q72H    predniSONE  40 mg Oral Daily with breakfast    senna-docusate  2 tablet Oral Daily    midodrine  5 mg Oral BID AC    heparin  5,000 Units Subcutaneous 2 times per day    amiodarone  200 mg Oral Daily    carvedilol  3.125 mg Oral BID with meals    folic acid  800 mcg Oral Daily    gabapentin  300 mg Oral TID    isosorbide dinitrate  20 mg Oral TID (Nitrates)    pantoprazole  40 mg Oral BID AC           Assessment:    Nonischemic severe biventricular failure, s/p DC ICD     - Normal coronaries on [facility] in Oct '22  - 7/15 CXR - mod-large left pleural effusion noted, progressive pulm edema   - 7/16 S/p left thoracentesis w/ 1 L removed  - 7/17 RHC - RV 38/19, Mean RA 22 mmHg, Wedge 16 mmHg, PA 47/21. CI 2.2. Evidence of congestion & R sided vent failure. Started on bumex gtt 1mg/hr  - GDMT : historically on farxiga. Aldactone, & entresto held due to hypotension. On isordil     Hx of paroxysmal atrial flutter/fib  - AV paced  - Hx of L atrial appendage occlusion  - On amiodarone. 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

## 2024-07-21 NOTE — PLAN OF CARE
Bed locked in low position, belongings in reach, alarm on, call light in reach. Frequent rounding done, all patient needs met. Non-slip socks on/at bedside. Dramatic difference in her pain levels this shift after application of Fentanyl patch yesterday. Per Patient her pain levels have been tolerable without the need for other pain medications. Dobutamine and Bumex GTTs continue.     Problem: CARDIOVASCULAR - ADULT  Goal: Maintains optimal cardiac output and hemodynamic stability  Description: INTERVENTIONS:  - Monitor vital signs, rhythm, and trends  - Monitor for bleeding, hypotension and signs of decreased cardiac output  - Evaluate effectiveness of vasoactive medications to optimize hemodynamic stability  - Monitor arterial and/or venous puncture sites for bleeding and/or hematoma  - Assess quality of pulses, skin color and temperature  - Assess for signs of decreased coronary artery perfusion - ex. Angina  - Evaluate fluid balance, assess for edema, trend weights  Outcome: Progressing  Goal: Absence of cardiac arrhythmias or at baseline  Description: INTERVENTIONS:  - Continuous cardiac monitoring, monitor vital signs, obtain 12 lead EKG if indicated  - Evaluate effectiveness of antiarrhythmic and heart rate control medications as ordered  - Initiate emergency measures for life threatening arrhythmias  - Monitor electrolytes and administer replacement therapy as ordered  Outcome: Progressing     Problem: RESPIRATORY - ADULT  Goal: Achieves optimal ventilation and oxygenation  Description: INTERVENTIONS:  - Assess for changes in respiratory status  - Assess for changes in mentation and behavior  - Position to facilitate oxygenation and minimize respiratory effort  - Oxygen supplementation based on oxygen saturation or ABGs  - Provide Smoking Cessation handout, if applicable  - Encourage broncho-pulmonary hygiene including cough, deep breathe, Incentive Spirometry  - Assess the need for suctioning and perform as  needed  - Assess and instruct to report SOB or any respiratory difficulty  - Respiratory Therapy support as indicated  - Manage/alleviate anxiety  - Monitor for signs/symptoms of CO2 retention  Outcome: Progressing     Problem: PAIN - ADULT  Goal: Verbalizes/displays adequate comfort level or patient's stated pain goal  Description: INTERVENTIONS:  - Encourage pt to monitor pain and request assistance  - Assess pain using appropriate pain scale  - Administer analgesics based on type and severity of pain and evaluate response  - Implement non-pharmacological measures as appropriate and evaluate response  - Consider cultural and social influences on pain and pain management  - Manage/alleviate anxiety  - Utilize distraction and/or relaxation techniques  - Monitor for opioid side effects  - Notify MD/LIP if interventions unsuccessful or patient reports new pain  - Anticipate increased pain with activity and pre-medicate as appropriate  Outcome: Progressing

## 2024-07-21 NOTE — CHRONIC PAIN
[facility]  Report of Consultation    Margaret Silverio Patient Status:  Inpatient    3/14/1945 MRN [id number]   Location [facility] 3W/SW Attending Shashi Sanchez MD   Hosp Day # 6 PCP Johnathon Rodriguez DO     Date of Admission:  7/15/2024  Date of Consult:  2024    Reason for Consultation:   1. Acute on chronic congestive heart failure, unspecified heart failure type (HCC)    Severe rheumatoid arthritis  Acute on chronic neck pain w/ radiculopathy  Multilevel spondylosis C3-5       History of Present Illness:  Margaret Silverio is a a(n) 79 year old female with underlying severe nonischemic cardiomyopathy and multiple recent hospitalizations for heart failure, came in today to the emergency department with recurrent shortness of breath, status post biventricular ICD with moderate mitral regurgitation; gastrointestinal bleed secondary to arteriovenous malformations,  chronic kidney disease; hypertension; generalized osteoarthritis; history of DVT; rheumatoid arthritis; paroxysmal atrial fibrillation Rheumatoid. Treated by Dr Lee PM&R as an outpt with gabapentin and norco    Placed on fentanyl patch and now comfortable      History:  Past Medical History:    Acute kidney insufficiency    Anemia    Arrhythmia    Back problem    CHF (congestive heart failure) (HCC)    CKD (chronic kidney disease) stage 3, GFR 30-59 ml/min (HCC)    Deep vein thrombosis (HCC)    on B.T for only a month, possibly Magen    Essential hypertension    High blood pressure    History of blood transfusion    Rheumatoid arthritis (HCC)    Seizure disorder (HCC)    Pt denied at screening call 24     Past Surgical History:   Procedure Laterality Date    Cabg      Cardiac pacemaker placement      Colonoscopy N/A 2024    Dr. Rios    Colonoscopy N/A 2024    Procedure: COLONOSCOPY;  Surgeon: Zoraida Rios MD;  Location: [facility] ENDOSCOPY    Egd N/A 2024    Dr. Rios    Fracture surgery       Other surgical history  2017    Heart Surgery     Other surgical history  2020    Bilateral shoulder replacement      No family history on file.   reports that she quit smoking about 10 years ago. Her smoking use included cigarettes. She has never used smokeless tobacco. She reports that she does not drink alcohol and does not use drugs.    Allergies:  Allergies   Allergen Reactions    Lisinopril Coughing       Medications:    Current Facility-Administered Medications:     bumetanide (Bumex) tab 1 mg, 1 mg, Oral, BID (Diuretic)    methotrexate (Rheumatrex) tab 15 mg, 15 mg, Oral, Weekly    [START ON 7/22/2024] predniSONE (Deltasone) tab 20 mg, 20 mg, Oral, Daily with breakfast    fentaNYL (Duragesic) 12 MCG/HR patch 1 patch, 1 patch, Transdermal, Q72H    senna-docusate (Senokot-S) 8.6-50 MG per tab 2 tablet, 2 tablet, Oral, Daily    polyethylene glycol (PEG 3350) (Miralax) 17 g oral packet 17 g, 17 g, Oral, Daily PRN    bisacodyl (Dulcolax) 10 MG rectal suppository 10 mg, 10 mg, Rectal, Daily PRN    HYDROcodone-acetaminophen (Norco)  MG per tab 1 tablet, 1 tablet, Oral, Q4H PRN    senna-docusate (Senokot-S) 8.6-50 MG per tab 2 tablet, 2 tablet, Oral, Daily PRN    midodrine (ProAmatine) tab 5 mg, 5 mg, Oral, BID AC    heparin (Porcine) 5000 UNIT/ML injection 5,000 Units, 5,000 Units, Subcutaneous, 2 times per day    acetaminophen (Tylenol Extra Strength) tab 500 mg, 500 mg, Oral, Q4H PRN    ondansetron (Zofran) 4 MG/2ML injection 4 mg, 4 mg, Intravenous, Q6H PRN    metoclopramide (Reglan) 5 mg/mL injection 5 mg, 5 mg, Intravenous, Q8H PRN    amiodarone (Pacerone) tab 200 mg, 200 mg, Oral, Daily    carvedilol (Coreg) tab 3.125 mg, 3.125 mg, Oral, BID with meals    folic acid (Folvite) tab 800 mcg, 800 mcg, Oral, Daily    gabapentin (Neurontin) cap 300 mg, 300 mg, Oral, TID    isosorbide dinitrate (Isordil) tab 20 mg, 20 mg, Oral, TID (Nitrates)    pantoprazole (Protonix) DR tab 40 mg, 40 mg, Oral, BID  AC  Scheduled Meds:   bumetanide  1 mg Oral BID (Diuretic)    methotrexate  15 mg Oral Weekly    [START ON 7/22/2024] predniSONE  20 mg Oral Daily with breakfast    fentaNYL  1 patch Transdermal Q72H    senna-docusate  2 tablet Oral Daily    midodrine  5 mg Oral BID AC    heparin  5,000 Units Subcutaneous 2 times per day    amiodarone  200 mg Oral Daily    carvedilol  3.125 mg Oral BID with meals    folic acid  800 mcg Oral Daily    gabapentin  300 mg Oral TID    isosorbide dinitrate  20 mg Oral TID (Nitrates)    pantoprazole  40 mg Oral BID AC     Continuous Infusions:  PRN Meds:.  polyethylene glycol (PEG 3350)    bisacodyl    HYDROcodone-acetaminophen    senna-docusate    acetaminophen    ondansetron    metoclopramide    Review of Systems:  Pertinent items are noted in HPI.    Physical Exam:  Blood pressure 121/67, pulse 62, temperature 98.2 °F (36.8 °C), temperature source Oral, resp. rate 18, weight 109 lb 6.4 oz (49.6 kg), SpO2 93%.  General: Alert and oriented x3, NAD, appears stated age, appropriate disposition and demeanor, answers questions appropriately appears to be comfortable in bed NAD  Head: normocephalic, atraumatic  Eyes: anicteric; no injection  Ears: no obvious deformities noted   Nose: externally grossly within normal limits, no unusual discharge or rhinorrhea   Throat: lips grossly within normal limits by visual exam externally  Neck: supple, trachea midline, no obvious JVD  Respiratory non labored  Laboratory Data:       Imaging:  Narrative   PROCEDURE: XR CHEST AP PORTABLE  (CPT=71045)  TIME: 0451 hours     COMPARISON: [facility], XR CHEST AP PORTABLE (CPT=71045), 2/11/2024, 5:43     INDICATIONS: Follow up chronic congestive heart failure. Essential Hypertension.  History arrhythmia     TECHNIQUE:   Single view.       FINDINGS:  CARDIAC/VASC: Moderate cardiomegaly.  Pulmonary venous distention  MEDIAST/CHUY:   Atherosclerotic aorta with no visible aneurysm.  Cardiac valve  prosthesis  LUNGS/PLEURA: Bilateral perihilar and lower lobe mixed alveolar and interstitial airspace disease with bilateral effusions. ICD left anterior chest with transvenous leads in the right atrium, right ventricle .  BONES: Mild scoliosis with mild degenerative disc disease and spondylosis.    OTHER: Bilateral shoulder prostheses.               Impression   CONCLUSION:  1. Moderate cardiomegaly.  Pulmonary venous distention.  Heart valve prosthesis.  2. Moderate perihilar lower lobe mixed alveolar and interstitial airspace opacification with bilateral effusions suggesting pulmonary congestive changes.  Slight progression right lung base.              Narrative   PROCEDURE: XR CERVICAL SPINE AP LAT FLEX EXT EM (CPT=72050)     COMPARISON: [facility], XR CERVICAL SPINE (4 VIEWS) (CPT=72050), 7/20/2022, 12:38 PM.     INDICATIONS: Left side neck pain post cervical tumor removal 5 months ago. No known injury.     TECHNIQUE: Cervical spine radiographs with flexion and extension views. (5 views)     FINDINGS:       ALIGNMENT: 4 millimeter anterolisthesis of C4 relative to C5, stable compared to recent.  VERTEBRAL BODIES:   No fracture deformity.  DISC SPACES:   Stable moderate disc narrowing C4-C7 accompanied by endplate sclerosis and anterior osteophytes  PREVERTEBRAL SOFT TISSUES: Negative.    FLEXION/EXTENSION: Very limited cervical range of motion with only mild flexion being achieved and no extension being achieved.  No dynamic instability is demonstrated on these very limited views..                 Impression   CONCLUSION:  Stable findings of moderate multilevel C-spine disc disease from C4-C7 with 4 millimeter anterolisthesis of C4 relative to C5         No dynamic instability on the flexion/extension views although very limited range of motion was achieved     Dictated by (CST): Rey Jackson MD on 9/13/2022 at 10:09 AM      Finalized by (CST): Rey Jackson MD on 9/13/2022 at 10:14  AM           Impression:  Patient Active Problem List   Diagnosis    Altered mental status    Altered mental status, unspecified altered mental status type    Seizure (HCC)    Lactic acidosis    Leukocytosis    Acute GI bleeding    Thrombocytopenia (HCC)    Metabolic acidosis    Atrial fibrillation, chronic (HCC)    Sepsis due to urinary tract infection (HCC)    Sepsis due to Escherichia coli (HCC)    ABLA (acute blood loss anemia)    Melena    Acute chest pain    Pleural effusion    ACC/AHA stage B systolic heart failure (HCC)    Congestive heart failure (HCC)    Coronary arteriosclerosis    Essential (primary) hypertension    Mitral valve stenosis    Rheumatoid arthritis (HCC)    Stage 3 chronic kidney disease (HCC)    Atrial flutter (HCC)    Acute on chronic HFrEF (heart failure with reduced ejection fraction) (HCC)    Dyspnea, unspecified type    Anticoagulated    Coagulopathy (HCC)    SIRS (systemic inflammatory response syndrome) (HCC)    Anemia due to stage 3 chronic kidney disease (HCC)    Anemia due to GI blood loss    Anemia of chronic disease    Lymphopenia    Pancytopenia (HCC)    Goals of care, counseling/discussion    Advance care planning    Palliative care by specialist    Acute deep vein thrombosis (DVT) of left upper extremity (HCC)    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HCC)    Cardiogenic shock (HCC)    HFrEF (heart failure with reduced ejection fraction) (HCC)    Anemia    Acute kidney injury (HCC)    Iron deficiency anemia, unspecified iron deficiency anemia type    Weakness generalized    Acute kidney insufficiency    Hyperkalemia    Cervical pain    Cervical radiculopathy    Pleural effusion, bilateral    Acute on chronic systolic (congestive) heart failure (HCC)    Cognitive communication deficit    Difficulty in walking, not elsewhere classified    Dysphagia, oral phase    Gastro-esophageal reflux disease without esophagitis    Cardiomyopathy, unspecified (HCC)    Major depressive disorder, single  episode, unspecified    Severe tricuspid regurgitation    S/P MVR (mitral valve replacement)    PAF (paroxysmal atrial fibrillation) ([facility])    ICD (implantable cardioverter-defibrillator), dual, in situ    CKD (chronic kidney disease) stage 4, GFR 15-29 ml/min ([facility])    Acute on chronic congestive heart failure, unspecified heart failure type ([facility])           Recommendations:    Patient started on fentanyl 12mcg patch yesterday and getting good relief would recommend use of her norco for breakthrough pain   Pain service to follow.      Comprehensive analgesic plan was formulated. Conservative vs. Aggressive measures were discussed at length including pharmacotherapy (eg. Anti- inflammatories, muscle relaxants, neuropathic medications, oral steroids, analgesics), injections, and further testing. Risks and benefits of all options were discussed at length to patients satisfaction during a comprehensive interactive discussion. All questions were answered during extended questions and answer session. Patient agreeable to discussion plan. Greater than 50% of the time was spent with counseling (nature of discussion centered around pain, therapy, and treatment options), face to face time, time spent reviewing data, obtaining patient information and discussing the care with the patients health care providers.     Thank you for allowing me to participate in the care of your patient.    Total time: 24 mins    MYRA COLES MD  7/21/2024  [facility] [phone]

## 2024-07-21 NOTE — PROGRESS NOTES
Progress Note     Margaret Silverio Patient Status:  Inpatient    3/14/1945 MRN [id number]   Location [facility] 3W/SW Attending Shashi Sanchez MD   Hosp Day # 6 PCP Johnathon Rodriguez DO     Chief Complaint:   Chief Complaint   Patient presents with    Shortness Of Breath         Subjective:   S: Patient seen and examined, chart reviewed.   Pain is miraculously gone now after starting duragesic patch. Of MS contin now.   Dobutamine stopped  Bumex drip switched to PO.     Patient is a bit confused.  Must watch, may be 2/2 to fentanyl patch.       Review of Systems:   10 point ROS completed and was negative, except for pertinent positive and negatives stated in subjective.                Objective:   Vital signs:  Temp:  [98.7 °F (37.1 °C)-99.1 °F (37.3 °C)] 98.9 °F (37.2 °C)  Pulse:  [72-76] 72  Resp:  [16-20] 17  BP: ()/(60-68) 117/60  SpO2:  [93 %-96 %] 96 %    Wt Readings from Last 6 Encounters:   24 109 lb 6.4 oz (49.6 kg)   24 124 lb 12.8 oz (56.6 kg)   24 136 lb (61.7 kg)   24 129 lb 9.6 oz (58.8 kg)   24 134 lb (60.8 kg)   24 140 lb (63.5 kg)       Physical Exam:    General: No acute distress.   Respiratory: Clear to auscultation bilaterally. No wheezes. No rhonchi.  Cardiovascular: S1, S2. Regular rate and rhythm. No murmurs, rubs or gallops.   Abdomen: Soft, nontender, nondistended.  Positive bowel sounds. No rebound or guarding.  Neurologic: No focal neurological deficits. Confused.  Weak.  Has not been OOB.   Musculoskeletal: Moves all extremities.  Extremities: No edema.    Results:   Diagnostic Data:      Labs:    Labs Last 24 Hours:   BMP     CBC    Other     Na - Cl - BUN - Glu -   Hb -   PTT - Procal -   K - CO2 - Cr -   WBC - >< PLT -  INR - CRP -   Renal Lytes Endo    Hct -   Trop - D dim -   eGFR - Ca - POC Gluc  -    LFT   pBNP - Lactic -   eGFR AA - PO4 - A1c -   AST - APk - Prot -  LDL -      Recent Labs   Lab 24  0701  07/17/24  0646 07/18/24  0923 07/19/24  0650 07/20/24  0532   RBC 3.01*   < > 3.13* 3.07* 2.99*   HGB 9.6*   < > 10.0* 9.7* 9.4*   HCT 30.4*   < > 31.6* 30.9* 30.1*   .0*   < > 101.0* 100.7* 100.7*   MCH 31.9   < > 31.9 31.6 31.4   MCHC 31.6   < > 31.6 31.4 31.2   RDW 19.3*   < > 19.3* 19.1* 18.9*   NEPRELIM 0.76*  --   --  1.99 2.28   WBC 1.8*   < > 4.5 3.7* 4.3   .0   < > 189.0 156.0 168.0    < > = values in this interval not displayed.       Lab Results   Component Value Date    INR 1.25 (H) 02/09/2024    INR 1.60 (H) 01/15/2024    INR 2.60 (H) 01/14/2024       Recent Labs   Lab 07/18/24  0923 07/19/24  0650 07/20/24  0532   * 82 94   BUN 40* 41* 44*   CREATSERUM 1.80* 2.00* 2.20*   CA 9.6 9.6 9.2    137 136   K 5.0 4.1 4.5    100 102   CO2 31.0 32.0 30.0       No results for input(s): \"HIWOT\", \"LIP\" in the last 168 hours.    Recent Labs   Lab 07/17/24  0646 07/18/24  0923 07/19/24  0650 07/20/24  0532   ESRML 40*  --   --   --    CRP 9.00*  --   --   --    MG  --  1.9 1.9 2.0       No results for input(s): \"URINE\", \"CULTI\", \"BLDSMR\" in the last 168 hours.      No results found.        Pro-Calcitonin  No results for input(s): \"PCT\" in the last 168 hours.    Cardiac  No results for input(s): \"TROP\", \"PBNP\" in the last 168 hours.    Imaging: Imaging data reviewed in Epic.    No results found.       Medications:    fentaNYL  1 patch Transdermal Q72H    predniSONE  40 mg Oral Daily with breakfast    senna-docusate  2 tablet Oral Daily    midodrine  5 mg Oral BID AC    heparin  5,000 Units Subcutaneous 2 times per day    amiodarone  200 mg Oral Daily    carvedilol  3.125 mg Oral BID with meals    folic acid  800 mcg Oral Daily    gabapentin  300 mg Oral TID    isosorbide dinitrate  20 mg Oral TID (Nitrates)    pantoprazole  40 mg Oral BID AC       Assessment & Plan:   ASSESSMENT / PLAN:     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    Dobutamine gtt stopped  Bumex gtt stopped and switched to PO 1 mg BID.   S/p thoracentesis with 1L transudative fluid removed 7/16    GDMT: on hold is farxiga, aldactone, coreg, entresto due to hypotension.   [facility] 2022 - normal coronaries.   S/p RHC 7/17 showed severe volume overload   Cards on consult  I/O, daily weights.   ECHO LVEF 30-35% dyskinesis of anterior walls. RV size increased  Large L pleural effusion   Hypoxia   S/p thoracentesis 1L taken off. transudate  Pulm on consult.   Was recently placed on o2 at home on 7/9 2L continuous prior to this not on home o2  RA:  ESR elev at 40, CRP elevatated at 9.  Will start prednisone 40 mg daily, reduce to 20 mg and complete 5 days.   CXR today  H/o paroxysmal A.fib   AV paced.   H/o BRANDYN occlusion   Not on A/C due to h/o GI bleed.   Amiodarone   Leukopenia   WBC now > 4  restart methotrexate  Rpt labs in AM.   GEORGE on CKD IV   Baseline creat 1.4 -1.6 per EMR.   BUN/creat trending down with diuresis.   Hypotension   Midodrine 2.5mg BID  Hold isordil for sBP < 100   Acute on chronic neck pain with radiculopathy  Multilevel spondylosis C3-5  Was seen by neurosurgery not a surgical candidate.   Pain control: improved on duragesic patch.  Off MS Contin.   Palliative care consult   Pain consult  Stop flexeril  Cont gabapentin 300mg TID, cont norco PRN,   Recently has GIUSEPPE outpt   Other medical problems  Severe RA with multiple joint deformities      35 minutes of critical care time spent reviewing chart, adjusting medications, monitoring patient on IV dobutamine and IV bumex drip, discussing with team        dvt prophylaxis: sc heparin  code status: DNR  dispo: to be determined     I personally reviewed the available laboratories. I discussed/will discuss the case with patient nurse and pain. I ordered laboratories, studies including a.m. labs. I adjusted medications including pain meds. Medical decision making high, risk is high.     >55min spent, >50% spent counseling  and coordinating care in the form of educating pt/family and d/w consultants and staff. Most of the time spent discussing the above plan.      Plan of care discussed with patient,  and nurse      Shashi Sanchez MD, FAAP, FACP  [facility] Hospitalist  I respond to Epic Chat and [facility]

## 2024-07-21 NOTE — PLAN OF CARE

## 2024-07-22 ENCOUNTER — TELEPHONE (OUTPATIENT)
Dept: INTERNAL MEDICINE UNIT | Facility: HOSPITAL | Age: 79
End: 2024-07-22

## 2024-07-22 VITALS
TEMPERATURE: 98 F | RESPIRATION RATE: 18 BRPM | OXYGEN SATURATION: 96 % | SYSTOLIC BLOOD PRESSURE: 117 MMHG | WEIGHT: 115.19 LBS | DIASTOLIC BLOOD PRESSURE: 52 MMHG | HEART RATE: 60 BPM | BODY MASS INDEX: 20 KG/M2

## 2024-07-22 PROBLEM — I50.9 ACUTE ON CHRONIC CONGESTIVE HEART FAILURE, UNSPECIFIED HEART FAILURE TYPE (HCC): Status: RESOLVED | Noted: 2024-01-01 | Resolved: 2024-01-01

## 2024-07-22 PROBLEM — Z71.89 GOALS OF CARE, COUNSELING/DISCUSSION: Status: RESOLVED | Noted: 2024-02-08 | Resolved: 2024-07-22

## 2024-07-22 PROBLEM — Z71.89 ADVANCE CARE PLANNING: Status: RESOLVED | Noted: 2024-01-01 | Resolved: 2024-01-01

## 2024-07-22 PROBLEM — Z51.5 PALLIATIVE CARE BY SPECIALIST: Status: RESOLVED | Noted: 2024-02-08 | Resolved: 2024-07-22

## 2024-07-22 PROBLEM — Z51.5 PALLIATIVE CARE BY SPECIALIST: Status: RESOLVED | Noted: 2024-01-01 | Resolved: 2024-01-01

## 2024-07-22 PROBLEM — I50.23 ACUTE ON CHRONIC SYSTOLIC (CONGESTIVE) HEART FAILURE (HCC): Status: RESOLVED | Noted: 2024-06-18 | Resolved: 2024-07-22

## 2024-07-22 PROBLEM — Z71.89 ADVANCE CARE PLANNING: Status: RESOLVED | Noted: 2024-02-08 | Resolved: 2024-07-22

## 2024-07-22 PROBLEM — I50.9 ACUTE ON CHRONIC CONGESTIVE HEART FAILURE, UNSPECIFIED HEART FAILURE TYPE (HCC): Status: RESOLVED | Noted: 2024-07-15 | Resolved: 2024-07-22

## 2024-07-22 PROBLEM — I50.23 ACUTE ON CHRONIC SYSTOLIC (CONGESTIVE) HEART FAILURE (HCC): Status: RESOLVED | Noted: 2024-01-01 | Resolved: 2024-01-01

## 2024-07-22 PROBLEM — Z71.89 GOALS OF CARE, COUNSELING/DISCUSSION: Status: RESOLVED | Noted: 2024-01-01 | Resolved: 2024-01-01

## 2024-07-22 LAB
ANION GAP SERPL CALC-SCNC: 6 MMOL/L (ref 0–18)
BUN BLD-MCNC: 62 MG/DL (ref 9–23)
CALCIUM BLD-MCNC: 9 MG/DL (ref 8.7–10.4)
CHLORIDE SERPL-SCNC: 100 MMOL/L (ref 98–112)
CO2 SERPL-SCNC: 29 MMOL/L (ref 21–32)
CREAT BLD-MCNC: 1.96 MG/DL
EGFRCR SERPLBLD CKD-EPI 2021: 26 ML/MIN/1.73M2 (ref 60–?)
GLUCOSE BLD-MCNC: 158 MG/DL (ref 70–99)
POTASSIUM SERPL-SCNC: 4.5 MMOL/L (ref 3.5–5.1)
SODIUM SERPL-SCNC: 135 MMOL/L (ref 136–145)

## 2024-07-22 PROCEDURE — 99231 SBSQ HOSP IP/OBS SF/LOW 25: CPT | Performed by: REGISTERED NURSE

## 2024-07-22 PROCEDURE — 99239 HOSP IP/OBS DSCHRG MGMT >30: CPT | Performed by: INTERNAL MEDICINE

## 2024-07-22 RX ORDER — FENTANYL 12.5 UG/1
1 PATCH TRANSDERMAL
Qty: 10 PATCH | Refills: 0 | Status: SHIPPED | OUTPATIENT
Start: 2024-07-23

## 2024-07-22 RX ORDER — CARVEDILOL 6.25 MG/1
6.25 TABLET ORAL 2 TIMES DAILY WITH MEALS
Qty: 60 TABLET | Refills: 1 | Status: SHIPPED | OUTPATIENT
Start: 2024-07-22

## 2024-07-22 RX ORDER — MIDODRINE HYDROCHLORIDE 5 MG/1
5 TABLET ORAL
Qty: 60 TABLET | Refills: 0 | Status: SHIPPED | OUTPATIENT
Start: 2024-07-22

## 2024-07-22 RX ORDER — CARVEDILOL 3.12 MG/1
3.12 TABLET ORAL ONCE
Status: COMPLETED | OUTPATIENT
Start: 2024-07-22 | End: 2024-07-22

## 2024-07-22 RX ORDER — CARVEDILOL 6.25 MG/1
6.25 TABLET ORAL 2 TIMES DAILY WITH MEALS
Status: DISCONTINUED | OUTPATIENT
Start: 2024-07-22 | End: 2024-07-22

## 2024-07-22 RX ORDER — POLYETHYLENE GLYCOL 3350 17 G/17G
17 POWDER, FOR SOLUTION ORAL DAILY PRN
Status: SHIPPED | COMMUNITY
Start: 2024-07-22

## 2024-07-22 RX ORDER — SENNA AND DOCUSATE SODIUM 50; 8.6 MG/1; MG/1
2 TABLET, FILM COATED ORAL 2 TIMES DAILY
Status: DISCONTINUED | OUTPATIENT
Start: 2024-07-22 | End: 2024-07-22

## 2024-07-22 RX ORDER — SENNA AND DOCUSATE SODIUM 50; 8.6 MG/1; MG/1
2 TABLET, FILM COATED ORAL DAILY
Qty: 30 TABLET | Refills: 0 | Status: SHIPPED | OUTPATIENT
Start: 2024-07-22

## 2024-07-22 RX ORDER — PREDNISONE 20 MG/1
20 TABLET ORAL
Qty: 5 TABLET | Refills: 0 | Status: SHIPPED | OUTPATIENT
Start: 2024-07-23 | End: 2024-07-28

## 2024-07-22 RX ORDER — BUMETANIDE 1 MG/1
1 TABLET ORAL
Qty: 60 TABLET | Refills: 0 | Status: SHIPPED | OUTPATIENT
Start: 2024-07-22

## 2024-07-22 NOTE — DISCHARGE SUMMARY
[facility]  part of [facility]    Discharge Summary    Margaret Silverio Patient Status:  Inpatient    3/14/1945 MRN [id number]   Location [facility] 3W/SW Attending Shashi Sanchez MD   Hosp Day # 7 PCP Johnathon Rodriguez DO     Date of Admission: 7/15/2024 Disposition: Home Health Care Services     Date of Discharge: 2024    Admitting Diagnosis: Acute on chronic congestive heart failure, unspecified heart failure type (HCC) [I50.9]    Hospital Discharge Diagnoses:   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   Large L pleural effusion   Hypoxia  H/o paroxysmal A.fib   Leukopenia   GEORGE on CKD IV   Hypotension   Acute on chronic neck pain with radiculopathy  Multilevel spondylosis C3-5  Severe RA with multiple joint deformities     Lace+ Score: 82  59-90 High Risk  29-58 Medium Risk  0-28   Low Risk.    TCM Follow-Up Recommendation:  LACE > 58: High Risk of readmission after discharge from the hospital.      Problem List:   Patient Active Problem List   Diagnosis    Altered mental status    Altered mental status, unspecified altered mental status type    Seizure (HCC)    Lactic acidosis    Leukocytosis    Acute GI bleeding    Thrombocytopenia (HCC)    Metabolic acidosis    Atrial fibrillation, chronic (HCC)    Sepsis due to urinary tract infection (HCC)    Sepsis due to Escherichia coli (HCC)    ABLA (acute blood loss anemia)    Melena    Acute chest pain    Pleural effusion    ACC/AHA stage B systolic heart failure (HCC)    Congestive heart failure (HCC)    Coronary arteriosclerosis    Essential (primary) hypertension    Mitral valve stenosis    Rheumatoid arthritis (HCC)    Stage 3 chronic kidney disease (HCC)    Atrial flutter (HCC)    Acute on chronic HFrEF (heart failure with reduced ejection fraction) (HCC)    Dyspnea, unspecified type    Anticoagulated    Coagulopathy (HCC)    SIRS (systemic inflammatory response  syndrome) ([facility])    Anemia due to stage 3 chronic kidney disease ([facility])    Anemia due to GI blood loss    Anemia of chronic disease    Lymphopenia    Pancytopenia ([facility])    Acute deep vein thrombosis (DVT) of left upper extremity ([facility])    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[facility])    Cardiogenic shock ([facility])    HFrEF (heart failure with reduced ejection fraction) ([facility])    Anemia    Acute kidney injury ([facility])    Iron deficiency anemia, unspecified iron deficiency anemia type    Weakness generalized    Acute kidney insufficiency    Hyperkalemia    Cervical pain    Cervical radiculopathy    Pleural effusion, bilateral    Cognitive communication deficit    Difficulty in walking, not elsewhere classified    Dysphagia, oral phase    Gastro-esophageal reflux disease without esophagitis    Cardiomyopathy, unspecified ([facility])    Major depressive disorder, single episode, unspecified    Severe tricuspid regurgitation    S/P MVR (mitral valve replacement)    PAF (paroxysmal atrial fibrillation) ([facility])    ICD (implantable cardioverter-defibrillator), dual, in situ    CKD (chronic kidney disease) stage 4, GFR 15-29 ml/min ([facility])       Reason for Admission: Recurrent acute on chronic systolic heart failure with large bilateral pleural effusions.     Physical Exam:   General appearance: alert, appears stated age and cooperative  Pulmonary:  clear to auscultation bilaterally  Cardiovascular: S1, S2 normal, no murmur, click, rub or gallop, regular rate and rhythm  Abdominal: soft, non-tender; bowel sounds normal; no masses,  no organomegaly  Extremities: extremities normal, atraumatic, no cyanosis or edema  Psychiatric: calm      History of Present Illness: The patient is a 79-year-old  female with underlying severe nonischemic cardiomyopathy and multiple recent hospitalizations for heart failure, came in today to the emergency department with recurrent shortness of breath for at least 1 week. She said she has been taking her Bumex.  Sometimes she skips 1 dose or 2 because she is incontinent. CBC today showed white blood cell count of 1.5, platelet count 221, hemoglobin 9.6 which is at baseline, .0. Chemistry and B-natriuretic peptide, both are still pending. Chest x-ray showed bilateral large pleural effusions, worse on the left than the right.     Hospital Course:   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   Dobutamine gtt stopped  Bumex gtt stopped and switched to PO 1 mg BID.   S/p thoracentesis with 1L transudative fluid removed 7/16    GDMT: I would restart her farxiga, aldactone, coreg and entresto if ok with cardiology.   LHC 2022 - normal coronaries.   S/p RHC 7/17 showed severe volume overload   Cards on consult  I/O, daily weights.   ECHO LVEF 30-35% dyskinesis of anterior walls. RV size increased  Large L pleural effusion   Hypoxia   S/p thoracentesis 1L taken off. transudate  Pulm on consult.   Was recently placed on o2 at home on 7/9 2L continuous prior to this not on home o2  RA:  ESR elev at 40, CRP elevatated at 9.  Will start prednisone 40 mg daily, reduce to 20 mg and complete 5 days.   Pleural fluids quantiferon neg, cx neg  CXR yesterday: CHF/fluid overload with bilateral pleural effusions and compressive atelectasis   H/o paroxysmal A.fib   AV paced.   H/o BRANDYN occlusion   Not on A/C due to h/o GI bleed.   Amiodarone   Leukopenia   WBC now > 4  restart methotrexate  Rpt labs in AM.   GEORGE on CKD IV   Baseline creat 1.4 -1.6 per EMR.   BUN/creat trending down with diuresis.   Hypotension   Midodrine 2.5mg BID  Hold isordil for sBP < 100   Acute on chronic neck pain with radiculopathy  Multilevel spondylosis C3-5  Was seen by neurosurgery not a surgical candidate.   Pain control: improved on duragesic patch.  Off MS Contin.   Palliative care consult   Pain consult  Stop flexeril  Cont gabapentin 300mg TID, cont norco PRN,   Recently has [facility] outpt   Other medical  problems  Severe RA with multiple joint deformities     Consultations: Cardiology, Palliative    Procedures: none    Complications: pain    Discharge Condition: Good    Discharge Medications:      Discharge Medications        START taking these medications        Instructions Prescription details   fentaNYL 12 MCG/HR Pt72  Commonly known as: Duragesic  Start taking on: July 23, 2024      Place 1 patch onto the skin every third day.   Quantity: 10 patch  Refills: 0     midodrine 5 MG Tabs  Commonly known as: ProAmatine      Take 1 tablet (5 mg total) by mouth 2 (two) times daily before meals.   Quantity: 60 tablet  Refills: 0     Naloxone HCl 4 MG/0.1ML Liqd      4 mg by Nasal route as needed. If patient remains unresponsive, repeat dose in other nostril 2-5 minutes after first dose.   Quantity: 1 kit  Refills: 0     Polyethylene Glycol 3350 17 g Pack  Commonly known as: MIRALAX      Take 17 g by mouth daily as needed.   Refills: 0     predniSONE 20 MG Tabs  Commonly known as: Deltasone  Start taking on: July 23, 2024      Take 1 tablet (20 mg total) by mouth daily with breakfast for 5 days.   Stop taking on: July 28, 2024  Quantity: 5 tablet  Refills: 0     senna-docusate 8.6-50 MG Tabs  Commonly known as: Senokot-S      Take 2 tablets by mouth daily.   Quantity: 30 tablet  Refills: 0            CHANGE how you take these medications        Instructions Prescription details   bumetanide 1 MG Tabs  Commonly known as: Bumex  What changed:   medication strength  how much to take  when to take this      Take 1 tablet (1 mg total) by mouth BID (Diuretic).   Quantity: 60 tablet  Refills: 0     carvedilol 6.25 MG Tabs  Commonly known as: Coreg  What changed:   medication strength  how much to take      Take 1 tablet (6.25 mg total) by mouth 2 (two) times daily with meals.   Quantity: 60 tablet  Refills: 1     gabapentin 300 MG Caps  Commonly known as: Neurontin  What changed: Another medication with the same name was  removed. Continue taking this medication, and follow the directions you see here.      Take 1 capsule (300 mg total) by mouth 3 (three) times daily.   Quantity: 90 capsule  Refills: 0            CONTINUE taking these medications        Instructions Prescription details   alendronate 70 MG Tabs  Commonly known as: Fosamax      Take 1 tablet (70 mg total) by mouth once a week.   Quantity: 12 tablet  Refills: 3     amiodarone 200 MG Tabs  Commonly known as: Pacerone      Take 1 tablet (200 mg total) by mouth daily.   Refills: 0     dapagliflozin 10 MG Tabs  Commonly known as: Farxiga      Take 1 tablet (10 mg total) by mouth daily.   Quantity: 30 tablet  Refills: 5     ferrous sulfate 325 (65 FE) MG Tbec      Take 1 tablet (325 mg total) by mouth daily with breakfast.   Refills: 0     folic acid 800 MCG Tabs  Commonly known as: FOLVITE      Take 1 tablet (800 mcg total) by mouth daily.   Quantity: 90 tablet  Refills: 0     HYDROcodone-acetaminophen  MG Tabs  Commonly known as: Norco      Take 1 tablet by mouth every 8 (eight) hours as needed for Pain.   Stop taking on: July 26, 2024  Quantity: 60 tablet  Refills: 0     isosorbide dinitrate 20 MG Tabs  Commonly known as: Isordil      Take 1 tablet (20 mg total) by mouth TID (Nitrates).   Quantity: 90 tablet  Refills: 3     lidocaine-menthol 4-1 % Ptch      Place 1 patch onto the skin daily.   Quantity: 30 patch  Refills: 0     methotrexate 2.5 MG Tabs  Commonly known as: Rheumatrex      TAKE 6 TABLETS BY MOUTH 1 TIME A WEEK   Quantity: 77 tablet  Refills: 0     pantoprazole 40 MG Tbec  Commonly known as: Protonix      TAKE 1 TABLET(40 MG) BY MOUTH TWICE DAILY BEFORE MEALS   Quantity: 180 tablet  Refills: 0     sacubitril-valsartan 49-51 MG Tabs  Commonly known as: Entresto      Take 1 tablet by mouth 2 (two) times daily.   Quantity: 60 tablet  Refills: 1     spironolactone 25 MG Tabs  Commonly known as: Aldactone       Refills: 0     Tylenol Extra Strength 500 MG  Tabs  Generic drug: acetaminophen      Take 1 tablet (500 mg total) by mouth every 6 (six) hours as needed for Pain.   Refills: 0               Where to Get Your Medications        These medications were sent to [id number] - [place], [place] - [address] AT [facility] [address], [phone], [phone] [address], [facility] [address]      Phone: [phone]   bumetanide 1 MG Tabs  carvedilol 6.25 MG Tabs  fentaNYL 12 MCG/HR Pt72  midodrine 5 MG Tabs  Naloxone HCl 4 MG/0.1ML Liqd  predniSONE 20 MG Tabs  senna-docusate 8.6-50 MG Tabs         Follow up Visits: Follow-up with  in 1 week      Shashi Sanchez MD  7/22/2024  3:13 PM    > 35 min

## 2024-07-22 NOTE — PLAN OF CARE
PRN norco given as needed. Max assist. Fall precautions in place.     Problem: Patient Centered Care  Goal: Patient preferences are identified and integrated in the patient's plan of care  Description: Interventions:  - What would you like us to know as we care for you? I'm from home with my family  - Provide timely, complete, and accurate information to patient/family  - Incorporate patient and family knowledge, values, beliefs, and cultural backgrounds into the planning and delivery of care  - Encourage patient/family to participate in care and decision-making at the level they choose  - Honor patient and family perspectives and choices  Outcome: Progressing     Problem: Patient/Family Goals  Goal: Patient/Family Long Term Goal  Description: Patient's Long Term Goal: discharge home    Interventions:  - monitor vitals, labs, imaging  - cards, pulm on consult  - See additional Care Plan goals for specific interventions  Outcome: Progressing  Goal: Patient/Family Short Term Goal  Description: Patient's Short Term Goal: manage pain    Interventions:   - Frequent pain assessments  - PRN pain meds  - non-pharmacological interventions  - See additional Care Plan goals for specific interventions  Outcome: Progressing     Problem: CARDIOVASCULAR - ADULT  Goal: Maintains optimal cardiac output and hemodynamic stability  Description: INTERVENTIONS:  - Monitor vital signs, rhythm, and trends  - Monitor for bleeding, hypotension and signs of decreased cardiac output  - Evaluate effectiveness of vasoactive medications to optimize hemodynamic stability  - Monitor arterial and/or venous puncture sites for bleeding and/or hematoma  - Assess quality of pulses, skin color and temperature  - Assess for signs of decreased coronary artery perfusion - ex. Angina  - Evaluate fluid balance, assess for edema, trend weights  Outcome: Progressing  Goal: Absence of cardiac arrhythmias or at baseline  Description: INTERVENTIONS:  - Continuous  cardiac monitoring, monitor vital signs, obtain 12 lead EKG if indicated  - Evaluate effectiveness of antiarrhythmic and heart rate control medications as ordered  - Initiate emergency measures for life threatening arrhythmias  - Monitor electrolytes and administer replacement therapy as ordered  Outcome: Progressing     Problem: RESPIRATORY - ADULT  Goal: Achieves optimal ventilation and oxygenation  Description: INTERVENTIONS:  - Assess for changes in respiratory status  - Assess for changes in mentation and behavior  - Position to facilitate oxygenation and minimize respiratory effort  - Oxygen supplementation based on oxygen saturation or ABGs  - Provide Smoking Cessation handout, if applicable  - Encourage broncho-pulmonary hygiene including cough, deep breathe, Incentive Spirometry  - Assess the need for suctioning and perform as needed  - Assess and instruct to report SOB or any respiratory difficulty  - Respiratory Therapy support as indicated  - Manage/alleviate anxiety  - Monitor for signs/symptoms of CO2 retention  Outcome: Progressing     Problem: PAIN - ADULT  Goal: Verbalizes/displays adequate comfort level or patient's stated pain goal  Description: INTERVENTIONS:  - Encourage pt to monitor pain and request assistance  - Assess pain using appropriate pain scale  - Administer analgesics based on type and severity of pain and evaluate response  - Implement non-pharmacological measures as appropriate and evaluate response  - Consider cultural and social influences on pain and pain management  - Manage/alleviate anxiety  - Utilize distraction and/or relaxation techniques  - Monitor for opioid side effects  - Notify MD/LIP if interventions unsuccessful or patient reports new pain  - Anticipate increased pain with activity and pre-medicate as appropriate  Outcome: Progressing

## 2024-07-22 NOTE — CHRONIC PAIN
[facility]  Inpatient Pain Management Progress Note      Patient name: Margaret Silverio 79 year old female  : 3/14/1945  MRN: [id number]    Diagnosis:   1. Acute on chronic congestive heart failure, unspecified heart failure type (HCC)        Current hospital day: Hospital Day: 8    Pain Scores: 6    Subjective: Pt seen and examined at bedside. No acute overnight events.  Tolerating patch without side effects. Denies any changes in bowel and bladder, fever, chills, cp, sob    History:  Past Medical History:    Acute kidney insufficiency    Anemia    Arrhythmia    Back problem    CHF (congestive heart failure) (HCC)    CKD (chronic kidney disease) stage 3, GFR 30-59 ml/min (HCC)    Deep vein thrombosis (HCC)    on B.T for only a month, possibly Magen    Essential hypertension    High blood pressure    History of blood transfusion    Rheumatoid arthritis (HCC)    Seizure disorder (HCC)    Pt denied at screening call 24     Past Surgical History:   Procedure Laterality Date    Cabg      Cardiac pacemaker placement      Colonoscopy N/A 2024    Dr. Rios    Colonoscopy N/A 2024    Procedure: COLONOSCOPY;  Surgeon: Zoraida Rios MD;  Location: [facility] ENDOSCOPY    Egd N/A 2024    Dr. Rios    Fracture surgery      Other surgical history      Heart Surgery     Other surgical history      Bilateral shoulder replacement      No family history on file.   reports that she quit smoking about 10 years ago. Her smoking use included cigarettes. She has never used smokeless tobacco. She reports that she does not drink alcohol and does not use drugs.    Allergies:  Allergies   Allergen Reactions    Lisinopril Coughing       Current Medications:  Scheduled Meds:   senna-docusate  2 tablet Oral BID    bumetanide  1 mg Oral BID (Diuretic)    methotrexate  15 mg Oral Weekly    predniSONE  20 mg Oral Daily with breakfast    fentaNYL  1 patch Transdermal Q72H    midodrine  5 mg Oral BID AC     heparin  5,000 Units Subcutaneous 2 times per day    amiodarone  200 mg Oral Daily    carvedilol  3.125 mg Oral BID with meals    folic acid  800 mcg Oral Daily    gabapentin  300 mg Oral TID    isosorbide dinitrate  20 mg Oral TID (Nitrates)    pantoprazole  40 mg Oral BID AC     Continuous Infusions:  PRN Meds:.  polyethylene glycol (PEG 3350)    bisacodyl    HYDROcodone-acetaminophen    senna-docusate    acetaminophen    ondansetron    metoclopramide    Exam:Blood pressure 138/65, pulse 60, temperature 98 °F (36.7 °C), temperature source Oral, resp. rate 16, weight 115 lb 3.2 oz (52.3 kg), SpO2 95%.    General: Alert and oriented x3, NAD, appears stated age, appropriate disposition and demeanor, answers questions appropriately   Head: normocephalic, atraumatic  Eyes: anicteric; no injection  Ears: no obvious deformities noted   Nose: externally grossly within normal limits, no unusual discharge or rhinorrhea   Throat: lips grossly within normal limits by visual exam externally  Neck: supple, trachea midline, no obvious JVD  Chest: S1, S2, RRR, no obvious visual deformity  Respiratory: CTAB  Abdomen: soft, non-tender, bowel sounds present  Neuro/ Musculoskeletal: moves all extremities, hands deformity   Assessment/Plan:  Pt is a 78 yo female with RA, OA, cervical radiculopathy   -cont w fentanyl patch  -norco for breakthru pain   -ensure bowel regimen   -pt/ot    Seen w Adria and alicew nursing   KODI Oneill   Total Time: 20     Lengthy discussion of risks, benefits, and alternative treatments were reviewed to patients satisfaction. All questions were answered. Patient agreeable to plan. Greater than 50% of the time was spent with counseling (nature of discussion centered around pain, therapy, and treatment options), face to face time, time spent reviewing data, obtaining patient information and discussing the care with the patients health care providers.     Chronic Pain Service [phone]

## 2024-07-22 NOTE — CM/SW NOTE
07/22/24 1500   Discharge disposition   Expected discharge disposition Home-Health   Post Acute Care Provider   ([facility])   Outpatient services Palliative  ([facility])   Discharge transportation Private car     Pt discussed during nursing rounds. Pt is stable for TN today. MD TN order entered. [facility] will provide RN and therapy services at TN, agency notified of TN home today. [facility] will provide [facility] services at TN, liaison Luiza notified of TN home today. Pt's family will provide transport at TN.    Plan: Home w/spouse with [facility] and [facility] today.    / to remain available for support and/or discharge planning.     KIM Springer    [phone]

## 2024-07-22 NOTE — PHYSICAL THERAPY NOTE
PHYSICAL THERAPY TREATMENT NOTE - INPATIENT     Room Number: 311/311-A       Presenting Problem: acute on chronic CHF  Co-Morbidities : RA in hands    Problem List  Principal Problem:    Acute on chronic congestive heart failure, unspecified heart failure type (HCC)  Active Problems:    Goals of care, counseling/discussion    Advance care planning    Palliative care by specialist    Acute on chronic systolic (congestive) heart failure (HCC)      PHYSICAL THERAPY ASSESSMENT   Patient demonstrates fair progress this session, goals  remain in progress.      Patient is requiring contact guard assist and minimal assist as a result of the following impairments: decreased functional strength, decreased endurance/aerobic capacity, and medical status.     Patient continues to function near baseline with transfers and gait.  Contributing factors to remaining limitations include decreased functional strength, decreased endurance/aerobic capacity, and medical status.  Next session anticipate patient to progress bed mobility, transfers, and gait.  Physical Therapy will continue to follow patient for duration of hospitalization.    Patient continues to benefit from continued skilled PT services: at discharge to promote prior level of function and safety with additional support and return home with home health PT.    PLAN  PT Treatment Plan: Bed mobility;Body mechanics;Endurance;Energy conservation;Patient education;Family education;Gait training;Balance training;Transfer training  Frequency (Obs): 5x/week    SUBJECTIVE  \"I only have to walk a little, and I don't get up if my  isn't home to help me.\"     OBJECTIVE  Precautions: Cervical brace;Spine;Bed/chair alarm;Fluid restriction    WEIGHT BEARING RESTRICTION      No restrictions.           PAIN ASSESSMENT   Rating: Other (Comment)  Location: neck pain   per RN pt is medicated  Management Techniques: Activity promotion;Body mechanics;Breathing  techniques;Relaxation;Repositioning    BALANCE  Static Sitting: Fair +  Dynamic Sitting: Fair  Static Standing: Fair -  Dynamic Standing: Fair -    AM-PAC '6-Clicks' INPATIENT SHORT FORM - BASIC MOBILITY  How much difficulty does the patient currently have...  Patient Difficulty: Turning over in bed (including adjusting bedclothes, sheets and blankets)?: A Little   Patient Difficulty: Sitting down on and standing up from a chair with arms (e.g., wheelchair, bedside commode, etc.): A Little   Patient Difficulty: Moving from lying on back to sitting on the side of the bed?: A Little   How much help from another person does the patient currently need...   Help from Another: Moving to and from a bed to a chair (including a wheelchair)?: A Little   Help from Another: Need to walk in hospital room?: A Little   Help from Another: Climbing 3-5 steps with a railing?: A Lot     AM-PAC Score:  Raw Score: 17   Approx Degree of Impairment: 50.57%   Standardized Score (AM-PAC Scale): 42.13   CMS Modifier (G-Code): CK    FUNCTIONAL ABILITY STATUS  Functional Mobility/Gait Assessment  Gait Assistance: Contact guard assist  Distance (ft): 20' x 2  Assistive Device: Rolling walker  Pattern: Shuffle  Sit to Stand: minimal assist    The patient's Approx Degree of Impairment: 50.57% has been calculated based on documentation in the [facility] '6 clicks' Inpatient Daily Activity Short Form.  Research supports that patients with this level of impairment may benefit from CÉSAR, however pt is improving near functional baseline and cleared to return home with support.  Final disposition will be made by interdisciplinary medical team.    Patient End of Session: Up in chair;Needs met;Call light within reach;RN aware of session/findings;All patient questions and concerns addressed;Alarm set;Family present    CURRENT GOALS   Goals to be met by: 8/10  Patient Goal Patient's self-stated goal is: unstated    Goal #1 Patient is able to demonstrate supine -  sit EOB @ level: supervision      Goal #1   Current Status  as above    Goal #2 Patient is able to demonstrate transfers Sit to/from Stand at assistance level: supervision with walker - rolling      Goal #2  Current Status  as above    Goal #3 Patient is able to ambulate 50 feet with assist device: walker - rolling at assistance level: supervision   Goal #3   Current Status  as above    Goal #4 Patient will negotiate 2 stairs/one curb w/ assistive device and supervision   Goal #4   Current Status  NT  per pt one stoop step to enter    Goal #5 Patient to demonstrate independence with home activity/exercise instructions provided to patient in preparation for discharge.   Goal #5   Current Status  in progress    Goal #6     Goal #6  Current Status       Gait Training: 15 minutes

## 2024-07-22 NOTE — PROGRESS NOTES
Progress Note  Margaret Silverio Patient Status:  Inpatient    3/14/1945 MRN [id number]   Location [facility] 3W/SW Attending Shashi Sanchez MD   Hosp Day # 7 PCP Johnathon Rodriguez, DO     Subjective:  Patient states that she feels well.     Objective:  /65 (BP Location: Radial)   Pulse 60   Temp 98 °F (36.7 °C) (Oral)   Resp 16   Wt 115 lb 3.2 oz (52.3 kg)   SpO2 95%   BMI 19.77 kg/m²     Telemetry: A/V paced      Intake/Output: -3832    Intake/Output Summary (Last 24 hours) at 2024 0926  Last data filed at 2024 0613  Gross per 24 hour   Intake 280 ml   Output 900 ml   Net -620 ml       Last 3 Weights   24 0355 115 lb 3.2 oz (52.3 kg)   24 0620 109 lb 6.4 oz (49.6 kg)   24 0450 112 lb 11.2 oz (51.1 kg)   24 0458 113 lb 9.6 oz (51.5 kg)   24 0508 117 lb 14.4 oz (53.5 kg)   24 0614 123 lb 12.8 oz (56.2 kg)   07/15/24 1830 124 lb 1.6 oz (56.3 kg)   24 1505 124 lb 12.8 oz (56.6 kg)   24 1501 136 lb (61.7 kg)       Labs:  Recent Labs   Lab 24  0923 24  0650 24  0532   * 82 94   BUN 40* 41* 44*   CREATSERUM 1.80* 2.00* 2.20*   EGFRCR 28* 25* 22*   CA 9.6 9.6 9.2    137 136   K 5.0 4.1 4.5    100 102   CO2 31.0 32.0 30.0     Recent Labs   Lab 24  0701 24  0646 24  0923 24  0650 24  0532   RBC 3.01*   < > 3.13* 3.07* 2.99*   HGB 9.6*   < > 10.0* 9.7* 9.4*   HCT 30.4*   < > 31.6* 30.9* 30.1*   .0*   < > 101.0* 100.7* 100.7*   MCH 31.9   < > 31.9 31.6 31.4   MCHC 31.6   < > 31.6 31.4 31.2   RDW 19.3*   < > 19.3* 19.1* 18.9*   NEPRELIM 0.76*  --   --  1.99 2.28   WBC 1.8*   < > 4.5 3.7* 4.3   .0   < > 189.0 156.0 168.0    < > = values in this interval not displayed.         Recent Labs   Lab 24  1137   TROPHS 14     Lab Results   Component Value Date    INR 1.25 (H) 2024    INR 1.60 (H) 01/15/2024    INR 2.60 (H) 2024       Diagnostics:      Review of Systems   Constitutional: Positive for malaise/fatigue.   Cardiovascular: Negative.    Respiratory: Negative.         Physical Exam:    Gen: Alert, oriented x 3, in no apparent distress  Heent: Pupils equal, reactive. Mucous membranes moist.   Cardiac: Regular rate and rhythm, normal S1,S2  Lungs: diminished  Abd: Soft, non tender, non distended  Ext: No edema  Skin: Warm, dry          Medications:     senna-docusate  2 tablet Oral BID    bumetanide  1 mg Oral BID (Diuretic)    methotrexate  15 mg Oral Weekly    predniSONE  20 mg Oral Daily with breakfast    fentaNYL  1 patch Transdermal Q72H    midodrine  5 mg Oral BID AC    heparin  5,000 Units Subcutaneous 2 times per day    amiodarone  200 mg Oral Daily    carvedilol  3.125 mg Oral BID with meals    folic acid  800 mcg Oral Daily    gabapentin  300 mg Oral TID    isosorbide dinitrate  20 mg Oral TID (Nitrates)    pantoprazole  40 mg Oral BID AC             Assessment:  Severe non ischemic cardiomyopathy  Diuresed with IV bumex and IV dobutamine, now on oral  Bumex. Negative 3.8 liters and 11 lbs  RHC 7/17 - RV 28/29, mean RA 22 mmHg, PCWP 16 mmHg, PA 47/21, CI 2.2  Currently on BB  Historically on farxiga, aldactone and entresto which have been held due to hypotension  Large left pleural effusion  S/p left thoracentesis with 1 liter removed  BiV ICD  Paroxysmal atrial fibrillation  AV paced  On BB, amiodarone  Hx of BRANDYN occlusion  Hx of surgical mitral valve replacement  Neutropenia - improved  Acute on chronic Kidney disease - creatinine 2.2  Baseline creatinine 1.4-1.6  Severe RA    Plan:  Increase  BB, continue oral bumex and isordil   Resume farxiga, entresto and aldacone as BP tolerates    Plan of care discussed with patient, RN.    YOLIE Livingston  7/22/2024  9:26 AM

## 2024-07-22 NOTE — PROGRESS NOTES
Progress Note     Margaret Silverio Patient Status:  Inpatient    3/14/1945 MRN [id number]   Location [facility] 3W/SW Attending Shashi Sanchez MD   Hosp Day # 7 PCP Johnathon Rodriguez DO     Chief Complaint:   Chief Complaint   Patient presents with    Shortness Of Breath         Subjective:   S: Patient seen and examined, chart reviewed.   Smiling and feeling well today.       Review of Systems:   10 point ROS completed and was negative, except for pertinent positive and negatives stated in subjective.                Objective:   Vital signs:  Temp:  [97.8 °F (36.6 °C)-98.2 °F (36.8 °C)] 98 °F (36.7 °C)  Pulse:  [60-72] 60  Resp:  [16-18] 16  BP: (106-138)/(61-73) 138/65  SpO2:  [93 %-96 %] 95 %    Wt Readings from Last 6 Encounters:   24 115 lb 3.2 oz (52.3 kg)   24 124 lb 12.8 oz (56.6 kg)   24 136 lb (61.7 kg)   24 129 lb 9.6 oz (58.8 kg)   24 134 lb (60.8 kg)   24 140 lb (63.5 kg)       Physical Exam:    General: No acute distress.   Respiratory: decre desirae but clear to auscultation bilateral.   Cardiovascular: S1, S2. Regular rate and rhythm. No murmurs, rubs or gallops.   Abdomen: Soft, nontender, nondistended.  Positive bowel sounds. No rebound or guarding.  Neurologic: No focal neurological deficits. Less confused today  Musculoskeletal: Moves all extremities.  Extremities: 1+ edema.    Results:   Diagnostic Data:      Labs:    Labs Last 24 Hours:   BMP     CBC    Other     Na - Cl - BUN - Glu -   Hb -   PTT - Procal -   K - CO2 - Cr -   WBC - >< PLT -  INR - CRP -   Renal Lytes Endo    Hct -   Trop - D dim -   eGFR - Ca - POC Gluc  -    LFT   pBNP - Lactic -   eGFR AA - PO4 - A1c -   AST - APk - Prot -  LDL -      Recent Labs   Lab 24  0701 24  0646 24  0923 24  0650 24  0532   RBC 3.01*   < > 3.13* 3.07* 2.99*   HGB 9.6*   < > 10.0* 9.7* 9.4*   HCT 30.4*   < > 31.6* 30.9* 30.1*   .0*   < > 101.0* 100.7* 100.7*    MCH 31.9   < > 31.9 31.6 31.4   MCHC 31.6   < > 31.6 31.4 31.2   RDW 19.3*   < > 19.3* 19.1* 18.9*   NEPRELIM 0.76*  --   --  1.99 2.28   WBC 1.8*   < > 4.5 3.7* 4.3   .0   < > 189.0 156.0 168.0    < > = values in this interval not displayed.       Lab Results   Component Value Date    INR 1.25 (H) 02/09/2024    INR 1.60 (H) 01/15/2024    INR 2.60 (H) 01/14/2024       Recent Labs   Lab 07/18/24  0923 07/19/24  0650 07/20/24  0532   * 82 94   BUN 40* 41* 44*   CREATSERUM 1.80* 2.00* 2.20*   CA 9.6 9.6 9.2    137 136   K 5.0 4.1 4.5    100 102   CO2 31.0 32.0 30.0       No results for input(s): \"HIWOT\", \"LIP\" in the last 168 hours.    Recent Labs   Lab 07/17/24  0646 07/18/24  0923 07/19/24  0650 07/20/24  0532   ESRML 40*  --   --   --    CRP 9.00*  --   --   --    MG  --  1.9 1.9 2.0       Imaging: Imaging data reviewed in [facility].    XR CHEST AP PORTABLE  (CPT=71045)    Result Date: 7/21/2024  CONCLUSION:  1. CHF/fluid overload with bilateral pleural effusions and compressive atelectasis.    Dictated by (CST): Jesus Jackson MD on 7/21/2024 at 2:58 PM     Finalized by (CST): Jesus Jackson MD on 7/21/2024 at 2:59 PM              Medications:    senna-docusate  2 tablet Oral BID    bumetanide  1 mg Oral BID (Diuretic)    methotrexate  15 mg Oral Weekly    predniSONE  20 mg Oral Daily with breakfast    fentaNYL  1 patch Transdermal Q72H    midodrine  5 mg Oral BID AC    heparin  5,000 Units Subcutaneous 2 times per day    amiodarone  200 mg Oral Daily    carvedilol  3.125 mg Oral BID with meals    folic acid  800 mcg Oral Daily    gabapentin  300 mg Oral TID    isosorbide dinitrate  20 mg Oral TID (Nitrates)    pantoprazole  40 mg Oral BID AC       Assessment & Plan:   ASSESSMENT / PLAN:     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   Dobutamine gtt stopped  Bumex gtt stopped and switched to PO 1 mg BID.   S/p thoracentesis with 1L transudative  fluid removed 7/16    GDMT: I would restart her farxiga, aldactone, coreg and entresto if ok with cardiology.   [facility] 2022 - normal coronaries.   S/p RHC 7/17 showed severe volume overload   Cards on consult  I/O, daily weights.   ECHO LVEF 30-35% dyskinesis of anterior walls. RV size increased  Large L pleural effusion   Hypoxia   S/p thoracentesis 1L taken off. transudate  Pulm on consult.   Was recently placed on o2 at home on 7/9 2L continuous prior to this not on home o2  RA:  ESR elev at 40, CRP elevatated at 9.  Will start prednisone 40 mg daily, reduce to 20 mg and complete 5 days.   Pleural fluids quantiferon neg, cx neg  CXR yesterday: CHF/fluid overload with bilateral pleural effusions and compressive atelectasis   H/o paroxysmal A.fib   AV paced.   H/o BRANDYN occlusion   Not on A/C due to h/o GI bleed.   Amiodarone   Leukopenia   WBC now > 4  restart methotrexate  Rpt labs in AM.   GEORGE on CKD IV   Baseline creat 1.4 -1.6 per EMR.   BUN/creat trending down with diuresis.   Hypotension   Midodrine 2.5mg BID  Hold isordil for sBP < 100   Acute on chronic neck pain with radiculopathy  Multilevel spondylosis C3-5  Was seen by neurosurgery not a surgical candidate.   Pain control: improved on duragesic patch.  Off MS Contin.   Palliative care consult   Pain consult  Stop flexeril  Cont gabapentin 300mg TID, cont norco PRN,   Recently has GIUSEPPE outpt   Other medical problems  Severe RA with multiple joint deformities      35 minutes of critical care time spent reviewing chart, adjusting medications, monitoring patient on IV bumex bid, discussing with team        dvt prophylaxis: sc heparin  code status: DNR  dispo:  Home with      I personally reviewed the available laboratories. I discussed/will discuss the case with patient nurse and pain. I ordered laboratories, studies including a.m. labs. I adjusted medications including pain meds. Medical decision making high, risk is high.     >55min spent, >50% spent  counseling and coordinating care in the form of educating pt/family and d/w consultants and staff. Most of the time spent discussing the above plan.      Plan of care discussed with patient          Shashi Sanchez MD, FAAP, FACP  Formerly [facility] Hospitalist  I respond to Epic Chat and AMS Connect

## 2024-07-22 NOTE — OCCUPATIONAL THERAPY NOTE
OCCUPATIONAL THERAPY TREATMENT NOTE - INPATIENT    Room Number: 311/311-A     Presenting Problem: Acute on chronic CHF     Problem List  Principal Problem:    Acute on chronic congestive heart failure, unspecified heart failure type (HCC)  Active Problems:    Goals of care, counseling/discussion    Advance care planning    Palliative care by specialist    Acute on chronic systolic (congestive) heart failure (HCC)      OCCUPATIONAL THERAPY ASSESSMENT   Patient demonstrates good  progress this session, goals remain in progress.    Patient is requiring up to Mod A for ADLs  as a result of the following impairments: activity tolerance, endurance, requiring assist at baseline.    Contributing factors to remaining limitations include decreased functional strength, decreased endurance, impaired motor planning, and decreased muscular endurance.  Next session anticipate patient to progress with OT POC.  Occupational Therapy will continue to follow patient for duration of hospitalization.    Patient continues to benefit from continued skilled OT services: at discharge to promote prior level of function.  Anticipate patient will return home with home health OT.     PLAN  OT Treatment Plan: Balance activities;Energy conservation/work simplification techniques;ADL training;Functional transfer training;Endurance training;Patient/Family education;Patient/Family training;Compensatory technique education  OT Device Recommendations: TBD    SUBJECTIVE  I wont move unless my  is there     OBJECTIVE  Precautions: Cervical brace;Spine;Bed/chair alarm;Fluid restriction    WEIGHT BEARING RESTRICTION     PAIN ASSESSMENT  Rating: Unable to rate  Location: chronic neck/joint pain  Management Techniques: Activity promotion    ACTIVITIES OF DAILY LIVING ASSESSMENT  AM-PAC ‘6-Clicks’ Inpatient Daily Activity Short Form  How much help from another person does the patient currently need…  -   Putting on and taking off regular lower body  clothing?: A Little  -   Bathing (including washing, rinsing, drying)?: A Little  -   Toileting, which includes using toilet, bedpan or urinal? : A Little  -   Putting on and taking off regular upper body clothing?: A Little  -   Taking care of personal grooming such as brushing teeth?: A Little  -   Eating meals?: A Little    AM-PAC Score:  Score: 18  Approx Degree of Impairment: 46.65%  Standardized Score (AM-PAC Scale): 38.66  CMS Modifier (G-Code): CK    FUNCTIONAL TRANSFER ASSESSMENT     BED MOBILITY  Rolling: Minimal Assist  Supine to Sit : Minimal Assist  Sit to Supine (OT): Minimal Assist  Scooting: Min A    BALANCE ASSESSMENT  Static Sitting: Stand-by Assist    FUNCTIONAL ADL ASSESSMENT  Eating: Supervision  Grooming Seated: Minimal Assist  Bathing Seated: Moderate Assist  UB Dressing Seated: Minimal Assist  LB Dressing Seated: Moderate Assist  Toileting Seated: Minimal Assist      EDUCATION PROVIDED  Patient: Role of Occupational Therapy; Plan of Care; Discharge Recommendations  Patient's Response to Education: Verbalized Understanding    The patient's Approx Degree of Impairment: 46.65% has been calculated based on documentation in the [facility] '6 clicks' Inpatient Daily Activity Short Form.  Research supports that patients with this level of impairment may benefit from HH.  Final disposition will be made by interdisciplinary medical team.    Patient End of Session: Up in chair;Needs met;Call light within reach;RN aware of session/findings;All patient questions and concerns addressed    OT Goals:      OT Goals  Patient self-stated goal is: to return home     Patient will complete LE dressing with SBA   Comment: ongoing    Patient will complete toilet transfer with SBA   Comment: ongoing    Patient will complete self care task at sink level with SBA    Comment:ongoing     Comment:         Goals  on: 8/15  Frequency:3-5x week     OT Session Time: 18 minutes  Self-Care Home Management: 0  minutes  Therapeutic Activity: 18 minutes

## 2024-07-22 NOTE — SPIRITUAL CARE NOTE
Spiritual Care Visit Note    Patient Name: Margaret Silverio Date of Spiritual Care Visit: 24   : 3/14/1945 Primary Dx: Acute on chronic congestive heart failure, unspecified heart failure type (HCC)       Referred By: Referral From: ETIENNE Samayoa         Visit Type/Summary:     received referral for visit from ETIENNE ayala. Writer attempted to visit, patient resting in chair.    - Spiritual Care:  remains available as needed for follow up.    Spiritual Care support can be requested via an [facility] consult.   For urgent/immediate needs, please contact the On Call  at: [place]: ext [phone]    Chaplain Albertina.

## 2024-07-22 NOTE — DISCHARGE PLANNING
Patient was provided with discharge instructions, education, and follow up information. Prescriptions were already sent electronically to patient's pharmacy. Patient verbalizes understanding of follow up information, specifically PCP, cardiology, and palliative care APN. Patient has no questions after reviewing all instructions and will be going home.     Sakshi GIL, Discharge Leader [phone]

## 2024-07-22 NOTE — CARDIAC REHAB
CARDIAC REHAB HEART FAILURE EDUCATION    Handouts provided and reviewed: CHF Booklet.       Disease Process: Disease process reviewed.    Reviewed the following: DAILY WEIGHT MONITORING: reviewed       SODIUM RESTRICTION: reviewed, pt states she follows low sodium diet      FLUID RESTRICTION: reviewed, given measured cups for home use      RISK FACTORS: reviewed      SMOKING CESSATION: non smoker, no smokers in home      HOME EXERCISE ACTIVITY: reviewed chair exercises, encouraged increased activity as tolerated      OUTPATIENT CARDIAC REHAB: n/a       WHEN TO CONTACT YOUR PHYSICIAN: reviewed      HEART FAILURE CLINIC: [phone]

## 2024-07-22 NOTE — PALLIATIVE CARE NOTE
[facility]  part of [facility]  Palliative Care Progress Note    Margaret Silverio Patient Status:  Inpatient    3/14/1945 MRN [id number]   Location [facility] 3W/SW Attending Katiana Ochoa MD   Hosp Day # 7 PCP Johnathon Rodriguez,      The  Cures Act makes medical notes like these available to patients in the interest of transparency. Please be advised this is a medical document. Medical documents are intended to carry relevant information, facts as evident, and the clinical opinion of the practitioner. The medical note is intended as peer to peer communication and may appear blunt or direct. It is written in medical language and may contain abbreviations or verbiage that are unfamiliar.       Subjective     Reviewed symptom medications past 24 hrs: Norco 10/325 mg po X4, Fentanyl 12 mcg patch, Miralax 17 gm po X1    Patient was seen and examined in bed with RN at the bedside. Pt is A&OX4 and appears comfortable. She tells me her L sided neck pain has improved greatly since being started on Fentanyl patch, current pain 6/10 and using heating pad. Denies any dyspnea symptoms on RA. Appetite is fair, denies abdominal pain, n/v and +constipation with no BM in 7 days but denies any bloating or distention and feels she may go today. VSS. Off Bumex and dobutamine gtts.    See summary of discussion below.     Review of Systems:  Pertinent items are noted in subjective    Allergies:  Allergies   Allergen Reactions    Lisinopril Coughing       Medications:     Current Facility-Administered Medications:     senna-docusate (Senokot-S) 8.6-50 MG per tab 2 tablet, 2 tablet, Oral, BID    bumetanide (Bumex) tab 1 mg, 1 mg, Oral, BID (Diuretic)    methotrexate (Rheumatrex) tab 15 mg, 15 mg, Oral, Weekly    predniSONE (Deltasone) tab 20 mg, 20 mg, Oral, Daily with breakfast    fentaNYL (Duragesic) 12 MCG/HR patch 1 patch, 1 patch, Transdermal, Q72H    polyethylene glycol (PEG  3350) (Miralax) 17 g oral packet 17 g, 17 g, Oral, Daily PRN    bisacodyl (Dulcolax) 10 MG rectal suppository 10 mg, 10 mg, Rectal, Daily PRN    HYDROcodone-acetaminophen (Norco)  MG per tab 1 tablet, 1 tablet, Oral, Q4H PRN    senna-docusate (Senokot-S) 8.6-50 MG per tab 2 tablet, 2 tablet, Oral, Daily PRN    midodrine (ProAmatine) tab 5 mg, 5 mg, Oral, BID AC    heparin (Porcine) 5000 UNIT/ML injection 5,000 Units, 5,000 Units, Subcutaneous, 2 times per day    acetaminophen (Tylenol Extra Strength) tab 500 mg, 500 mg, Oral, Q4H PRN    ondansetron (Zofran) 4 MG/2ML injection 4 mg, 4 mg, Intravenous, Q6H PRN    metoclopramide (Reglan) 5 mg/mL injection 5 mg, 5 mg, Intravenous, Q8H PRN    amiodarone (Pacerone) tab 200 mg, 200 mg, Oral, Daily    carvedilol (Coreg) tab 3.125 mg, 3.125 mg, Oral, BID with meals    folic acid (Folvite) tab 800 mcg, 800 mcg, Oral, Daily    gabapentin (Neurontin) cap 300 mg, 300 mg, Oral, TID    isosorbide dinitrate (Isordil) tab 20 mg, 20 mg, Oral, TID (Nitrates)    pantoprazole (Protonix) DR tab 40 mg, 40 mg, Oral, BID AC    Objective     Vital Signs:  Blood pressure 138/65, pulse 60, temperature 98 °F (36.7 °C), temperature source Oral, resp. rate 16, weight 115 lb 3.2 oz (52.3 kg), SpO2 95%.  Body mass index is 19.77 kg/m².  Present Level of pain: 6/10 L neck  Non-verbal signs of pain present: No    Physical Exam:  General: Alert and in no apparent distress. Weak, frail, thin appearing  HEENT: No focal deficits.  Cardiac: Regular rate and rhythm, S1, S2 normal, no murmur, rub or gallop.  Lungs: Diminished breath sounds bilaterally.  Normal excursions and effort.  Abdomen: Soft, non-tender, normal bowel sounds X 4 quadrants, no rebound or guarding  Extremities: Without clubbing, cyanosis. Peripheral pulses are 2+. BLE Edema not present  Neurologic: Alert and oriented X4, normal affect.  Skin: Warm and dry.    Prior to admission Palliative performance scale PPSv2 (%): 50    Current  PPS 40%    Hematology:  Lab Results   Component Value Date    WBC 4.3 07/20/2024    HGB 9.4 (L) 07/20/2024    HCT 30.1 (L) 07/20/2024    .0 07/20/2024       Coags:  Lab Results   Component Value Date    INR 1.25 (H) 02/09/2024    PTT 35.2 02/09/2024       Chemistry:  Lab Results   Component Value Date    CREATSERUM 2.20 (H) 07/20/2024    BUN 44 (H) 07/20/2024     07/20/2024    K 4.5 07/20/2024     07/20/2024    CO2 30.0 07/20/2024    GLU 94 07/20/2024    CA 9.2 07/20/2024    ALB 4.0 07/01/2024    ALKPHO 328 (H) 06/18/2024    BILT 0.8 06/18/2024    TP 7.7 06/18/2024    AST 23 06/18/2024    ALT 11 06/18/2024    MG 2.0 07/20/2024    PHOS 3.8 07/01/2024       Imaging:  XR CHEST AP PORTABLE  (CPT=71045)    Result Date: 7/21/2024  CONCLUSION:  1. CHF/fluid overload with bilateral pleural effusions and compressive atelectasis.    Dictated by (CST): Jesus Jackson MD on 7/21/2024 at 2:58 PM     Finalized by (CST): Jesus Jackson MD on 7/21/2024 at 2:59 PM           Follow up GOC Discussion      7/22/24-I talked with her about her clinical condition and she does feel she has improved overall. She is hopeful to go home soon and encouraged follow up with Angy URBANO on dc. Reinforced her pain and bowel meds for dc. She wants to continue with supportive care. Provided emotional support to pt who is coping adequately.      7/18/24-Provided clinical update to pt and also talked with her dtr Barbi by phone. We talked about her ongoing pain issues and pt is asking for help with getting her pain under better control. I discussed trial of long acting opioid tx to improve pain. Education provided on opioid tx and MOA/SE's which she and her dtr are in agreement with trying. I discussed she will need continued palliative care follow up on dc and they are agreeable to following with Angy URBANO in outpatient PC clinic as they were already referred to her from CHF clinic. Pt wants to continue with  supportive care and is not ready for hospice. Provided emotional support to pt/dtr who are coping adequately.     Discussed with Patient and Family: Yes  Patient's preference about sharing medical information: speak to pt and her dtr  Patient's decision making preferences: speak to pt  Code status: DNAR/DNI-selective  Have advanced directives been discussed with patient or healthcare power of : Yes        Healthcare Agent Appointed: Yes  Healthcare Agent's Name: Barbi Silveroi  Healthcare Agent's Phone Number: [phone]          Spiritual needs addressed: Patient/family declined Spiritual Care    Palliative disposition: Outpatient Palliative Care      Procedures:  No intubation      Palliative Care Assessment and Recommendations     Problem List:     Acute on chronic severe non-ischemic biventricular CHF   Severe TVR  L pleural effusion s/p thora on 7/16 1L removed  GEORGE on CKD  Hypoxia  Hypotension on midodrine  H/o Bioprosthetic mitral valve  H/o L subclavian DVT  Pacemaker/AICD  Pancytopenia  Pafib  H/o GIB  Rheumatoid arthritis  Weakness     Chronic neck pain r/t multilevel cervical DDD/RA  -Improved, previously followed with PMR Dr. Lee and had recent cervical block which did not help, pain service following  -Continue Fentanyl transdermal 12 mcg Q 72 hrs. Failed MS Contin which is now dc'd.  -Continue Tylenol 500 mg po Q 4 hrs prn  -Continue home Norco 10/325 mg 1 tab po Q 4 hrs prn   -Continue Gabapentin 300 mg po TID  -Continue Prednisone 20 mg po daily per MD  -Heating pad prn  -Will continue to monitor pain symptoms closely.      Constipation  -Ongoing issue, no BM in 7 days  -Increase Senna S 2 tabs po BID and give ducolax supp X1 if no results, continue Miralax daily prn, Senna S 2 tabs daily prn, ducolax supp prn while on opioid tx  -Discussed need for bowel regimen for prevention of OIC     Goals of care counseling  -see above for details  -Confirmed wishes for DNAR/DNI-selective and  continue supportive care.  -Ongoing GOC discussions will be needed over time.  -Pt/dtr are aware of the option for comfort care with hospice, NOT ready for this right now.  -Agreeable to palliative care following  -Dispo:  Pt prefers to return home on dc. Recommended follow up with Angy URBANO in outpatient palliative care clinic on dc for ongoing pain management and GOC.  SW to help with dc planning.   -Provided emotional support to pt/family who are coping adequately.     Advance care planning  -Pt's dtr Barbi Silverio is primary HCPOA #[phone].  -Previously completed POLST/ HCPOA paperwork in EP[employer].     Palliative Performance Scale 40%      Emotion support provided to patient/family today: Yes    A total of 25 mins were spent on this consult, which included all of the following: direct face to face contact, history taking, physical examination, and >50% was spent counseling and coordinating care.    Discussed today's visit with Dr. Sanchez and Bhavik GIL.    I will continue to follow clinically.     Aparna Mahan, ANP-BC, [facility] [id number]  7/22/2024  8:33 AM  Palliative Care Services

## 2024-07-22 NOTE — PROGRESS NOTES
Residential Palliative Liaison received palliative referral for community PC services. Spoke with Margaret at bedside to discuss Residential PC services. Residential PC services discussed and is agreeable to our PC services upon D/C home.    Dilma Lew  Residential Liaison  [phone]

## 2024-07-22 NOTE — TELEPHONE ENCOUNTER
Received prior authorization for Fentanyl 12mcg/hr 72hr patches from pharmacy. This RN called pharmacy to inquire if Good Rx coupon can be used. Price reduced to $55.45 with use of coupon.

## 2024-07-23 NOTE — PLAN OF CARE
Patient medically cleared for discharge. All safety measures are in place and call light is within reach.     Problem: Patient Centered Care  Goal: Patient preferences are identified and integrated in the patient's plan of care  Description: Interventions:  - What would you like us to know as we care for you? I'm from home with my family  - Provide timely, complete, and accurate information to patient/family  - Incorporate patient and family knowledge, values, beliefs, and cultural backgrounds into the planning and delivery of care  - Encourage patient/family to participate in care and decision-making at the level they choose  - Honor patient and family perspectives and choices  Outcome: Completed     Problem: Patient/Family Goals  Goal: Patient/Family Long Term Goal  Description: Patient's Long Term Goal: discharge home    Interventions:  - monitor vitals, labs, imaging  - cards, pulm on consult  - See additional Care Plan goals for specific interventions  Outcome: Completed  Goal: Patient/Family Short Term Goal  Description: Patient's Short Term Goal: manage pain    Interventions:   - Frequent pain assessments  - PRN pain meds  - non-pharmacological interventions  - See additional Care Plan goals for specific interventions  Outcome: Completed     Problem: CARDIOVASCULAR - ADULT  Goal: Maintains optimal cardiac output and hemodynamic stability  Description: INTERVENTIONS:  - Monitor vital signs, rhythm, and trends  - Monitor for bleeding, hypotension and signs of decreased cardiac output  - Evaluate effectiveness of vasoactive medications to optimize hemodynamic stability  - Monitor arterial and/or venous puncture sites for bleeding and/or hematoma  - Assess quality of pulses, skin color and temperature  - Assess for signs of decreased coronary artery perfusion - ex. Angina  - Evaluate fluid balance, assess for edema, trend weights  Outcome: Completed  Goal: Absence of cardiac arrhythmias or at  baseline  Description: INTERVENTIONS:  - Continuous cardiac monitoring, monitor vital signs, obtain 12 lead EKG if indicated  - Evaluate effectiveness of antiarrhythmic and heart rate control medications as ordered  - Initiate emergency measures for life threatening arrhythmias  - Monitor electrolytes and administer replacement therapy as ordered  Outcome: Completed     Problem: RESPIRATORY - ADULT  Goal: Achieves optimal ventilation and oxygenation  Description: INTERVENTIONS:  - Assess for changes in respiratory status  - Assess for changes in mentation and behavior  - Position to facilitate oxygenation and minimize respiratory effort  - Oxygen supplementation based on oxygen saturation or ABGs  - Provide Smoking Cessation handout, if applicable  - Encourage broncho-pulmonary hygiene including cough, deep breathe, Incentive Spirometry  - Assess the need for suctioning and perform as needed  - Assess and instruct to report SOB or any respiratory difficulty  - Respiratory Therapy support as indicated  - Manage/alleviate anxiety  - Monitor for signs/symptoms of CO2 retention  Outcome: Completed     Problem: PAIN - ADULT  Goal: Verbalizes/displays adequate comfort level or patient's stated pain goal  Description: INTERVENTIONS:  - Encourage pt to monitor pain and request assistance  - Assess pain using appropriate pain scale  - Administer analgesics based on type and severity of pain and evaluate response  - Implement non-pharmacological measures as appropriate and evaluate response  - Consider cultural and social influences on pain and pain management  - Manage/alleviate anxiety  - Utilize distraction and/or relaxation techniques  - Monitor for opioid side effects  - Notify MD/LIP if interventions unsuccessful or patient reports new pain  - Anticipate increased pain with activity and pre-medicate as appropriate  Outcome: Completed

## 2024-07-24 ENCOUNTER — PATIENT OUTREACH (OUTPATIENT)
Dept: CASE MANAGEMENT | Age: 79
End: 2024-07-24

## 2024-07-25 ENCOUNTER — TELEPHONE (OUTPATIENT)
Dept: HEMATOLOGY/ONCOLOGY | Facility: HOSPITAL | Age: 79
End: 2024-07-25

## 2024-07-25 ENCOUNTER — TELEPHONE (OUTPATIENT)
Dept: FAMILY MEDICINE CLINIC | Facility: CLINIC | Age: 79
End: 2024-07-25

## 2024-07-25 DIAGNOSIS — M06.9 RHEUMATOID ARTHRITIS OF HAND, UNSPECIFIED LATERALITY, UNSPECIFIED WHETHER RHEUMATOID FACTOR PRESENT (HCC): ICD-10-CM

## 2024-07-25 DIAGNOSIS — M51.36 LUMBAR DEGENERATIVE DISC DISEASE: ICD-10-CM

## 2024-07-25 DIAGNOSIS — M05.79 RHEUMATOID ARTHRITIS INVOLVING MULTIPLE SITES WITH POSITIVE RHEUMATOID FACTOR (HCC): ICD-10-CM

## 2024-07-25 DIAGNOSIS — M54.2 BILATERAL POSTERIOR NECK PAIN: ICD-10-CM

## 2024-07-25 RX ORDER — HYDROCODONE BITARTRATE AND ACETAMINOPHEN 10; 325 MG/1; MG/1
1 TABLET ORAL EVERY 8 HOURS PRN
Qty: 60 TABLET | Refills: 0 | Status: SHIPPED | OUTPATIENT
Start: 2024-07-25 | End: 2024-08-24

## 2024-07-25 NOTE — TELEPHONE ENCOUNTER
Called Margaret. No answer. I left a voice message letting her know I will call her tomorrow morning to set up a Palliative Care post hospital follow up and discuss Norco refill.

## 2024-07-25 NOTE — TELEPHONE ENCOUNTER
Margaret called and ask to speak with KODI Tobar. She reports she was discharged from the hospital on Monday, 7/22/2024. She was told to book a follow up appointment with KODI Tobar. She also reports she was receiving Norco  (1 tab po Q6 prn) for her neck pain while she was hospitalized. She continues to have terrible neck pain and is asking if she could send a refill for the [place]  to her pharmacy.     I told Margaret I would forward a message to KODI Tobar now. Margaret asked if KODI Tobar could call her back at [phone].

## 2024-07-25 NOTE — TELEPHONE ENCOUNTER
1)Pating calling back, stated she was in the hospital 7 days , was given Norco for head/neck pain, was not sent home with RX [facility]  -requesting rx   Reviewed Med list- last ordered rx by Felicitas Love,(6/26/24- #60 tablets) stated she do not see him anymore     2) there are no appts with You , pt prefers to see you but stated if you advise to see someone else she will next week

## 2024-07-25 NOTE — TELEPHONE ENCOUNTER
You can double book the 1:20 PM appointment for Friday, July 26, 2024.  Please let the patient know that she will be seen around 1:40 PM.  Thank you.

## 2024-07-25 NOTE — TELEPHONE ENCOUNTER
Patient, states that she was discharged from [facility] on 7-22-24 and was told to follow up with her PCP in a week. Patient would only like to schedule an appointment to see Dr. Rodriguez. The first available with Dr. Rodriguez is not until 8-26-24. Would the doctor like to add the patient to the schedule? If so, which date and time?

## 2024-07-26 ENCOUNTER — OFFICE VISIT (OUTPATIENT)
Dept: FAMILY MEDICINE CLINIC | Facility: CLINIC | Age: 79
End: 2024-07-26

## 2024-07-26 VITALS
DIASTOLIC BLOOD PRESSURE: 47 MMHG | OXYGEN SATURATION: 92 % | TEMPERATURE: 99 F | RESPIRATION RATE: 18 BRPM | SYSTOLIC BLOOD PRESSURE: 89 MMHG | HEART RATE: 56 BPM

## 2024-07-26 DIAGNOSIS — Z09 HOSPITAL DISCHARGE FOLLOW-UP: Primary | ICD-10-CM

## 2024-07-26 DIAGNOSIS — I10 ESSENTIAL (PRIMARY) HYPERTENSION: ICD-10-CM

## 2024-07-26 DIAGNOSIS — M54.2 BILATERAL POSTERIOR NECK PAIN: ICD-10-CM

## 2024-07-26 DIAGNOSIS — M06.00 RHEUMATOID ARTHRITIS WITH NEGATIVE RHEUMATOID FACTOR, INVOLVING UNSPECIFIED SITE (HCC): ICD-10-CM

## 2024-07-26 DIAGNOSIS — M51.36 LUMBAR DEGENERATIVE DISC DISEASE: ICD-10-CM

## 2024-07-26 DIAGNOSIS — I50.23 ACUTE ON CHRONIC HFREF (HEART FAILURE WITH REDUCED EJECTION FRACTION) (HCC): ICD-10-CM

## 2024-07-26 RX ORDER — SENNOSIDES A AND B 8.6 MG/1
TABLET, FILM COATED ORAL
COMMUNITY
Start: 2024-07-24

## 2024-07-26 RX ORDER — CYCLOBENZAPRINE HCL 10 MG
10 TABLET ORAL NIGHTLY
Qty: 30 TABLET | Refills: 1 | Status: SHIPPED | OUTPATIENT
Start: 2024-07-26 | End: 2024-09-24

## 2024-07-26 NOTE — PATIENT INSTRUCTIONS
Pain management via prescription medications as needed.  Comply with medications.  Monitor blood pressures and record at home. Limit salt intake.  Narcotic pain medication along with 10 mg cyclobenzaprine recommended.  Cyclobenzaprine to be taken nightly.

## 2024-07-26 NOTE — TELEPHONE ENCOUNTER
Called Margaret. Attempt #2 re: Norco refill and scheduling post hospital Palliative Care f/u.    No answer. Left voice message letting Cecilia know I will be in the office until 4p today and will return on Monday.     Awaiting call back.

## 2024-07-26 NOTE — TELEPHONE ENCOUNTER
Patient notified and added and informed as per below, she will be there.      Future Appointments   Date Time Provider Department Center   7/26/2024  1:20 PM Johnathon Rodriguez, [facility]   8/1/2024 11:00 AM Davina Wright NP [facility] SPCIALTY EM [facility]

## 2024-07-27 NOTE — PROGRESS NOTES
Subjective:   Margaret Silverio is a 79 year old female who presents for hospital follow up.   She was discharged from [facility] to Home Health Care Services  Admission Date: 7/15/24   Discharge Date: 7/22/24  Hospital Discharge Diagnosis: Acute on chronic congestive heart failure, acute cervicalgia    Interactive contact within 2 business days post discharge first initiated on Date: 7/24/2024    During the visit, the following was completed:  Obtained and reviewed discharge summary, continuity of care documents, and Hospitalist notes  Reviewed  BMP, magnesium, and CBC    HPI: This patient is a well-established 79-year-old burned-out rheumatoid arthritic -American female with chronic pain in multiple areas of her body with multiple deformities and incapacitating neck discomfort who was admitted with an acute exacerbation of chronic congestive heart failure.  Patient had to have thoracentesis to remove an abundant amount of pleural effusion which was nonmalignant and noninfectious.    Patient is much more comfortable during this encounter.  There is no resting dyspnea.  The patient is accompanied by her  who confirms that she is able to actually slowly but surely get around in the house and has increased her activity for doing so since being discharged from the hospital.    History/Other:   Current Medications:  Medication Reconciliation:  I am aware of an inpatient discharge within the last 30 days.  The discharge medication list has been reconciled with the patient's current medication list and reviewed by me.  See medication list for additions of new medication, and changes to current doses of medications and discontinued medications.  Outpatient Medications Marked as Taking for the 7/26/24 encounter (Office Visit) with Johnathon Rodriguez, DO   Medication Sig    sennosides (SENNA-TIME) 8.6 MG Oral Tab senna 8.6 mg tablet, [RxNorm: 638849]    cyclobenzaprine 10 MG Oral Tab Take 1 tablet (10 mg  total) by mouth nightly.       Review of Systems:  GENERAL: weight stable, energy stable, no sweating  SKIN: denies any unusual skin lesions  EYES: denies blurred vision or double vision  HEENT: denies nasal congestion, sinus pain or ST  LUNGS: denies shortness of breath with exertion  CARDIOVASCULAR: denies chest pain on exertion or palpitations  GI: denies abdominal pain, denies heartburn, denies diarrhea  MUSCULOSKELETAL: Chronic pain that is only temporarily improved on narcotic pain medication.  NEURO: denies headaches, denies dizziness, denies weakness  PSYCHE: denies depression or anxiety  HEMATOLOGIC: denies hx of anemia, or bruising, denies bleeding  ENDOCRINE: denies thyroid history  ALL/ASTHMA: denies hx of allergy or asthma    Objective:   No LMP recorded. Patient is postmenopausal.  Estimated body mass index is 19.77 kg/m² as calculated from the following:    Height as of 6/29/24: 5' 4\" (1.626 m).    Weight as of 7/22/24: 115 lb 3.2 oz (52.3 kg).   BP (!) 89/47   Pulse 56   Temp 98.7 °F (37.1 °C) (Oral)   Resp 18   SpO2 92%    GENERAL: well developed, well nourished, in no apparent distress  SKIN: no rashes, no suspicious lesions  HEENT: atraumatic, normocephalic, ears and throat are clear  EYES: PERRLA, EOMI, conjunctiva are clear  NECK: Stiff with painful range of motion  CHEST: no chest tenderness  LUNGS: clear to auscultation  CARDIO: RRR with 2/6 murmur  GI: good BS's, no masses, HSM or tenderness  MUSCULOSKELETAL: Multiple joint deformities apparent.  EXTREMITIES: no cyanosis, clubbing or edema  NEURO: Oriented times three, cranial nerves are intact, motor and sensory are grossly intact    Assessment & Plan:   1. Hospital discharge follow-up  Status improved comparatively prior to admission.    2. Acute on chronic HFrEF (heart failure with reduced ejection fraction) (HCC)  Currently compensated.    3. Bilateral posterior neck pain  Prescribed.  - cyclobenzaprine 10 MG Oral Tab; Take 1 tablet  (10 mg total) by mouth nightly.  Dispense: 30 tablet; Refill: 1    4. Rheumatoid arthritis with negative rheumatoid factor, involving unspecified site (HCC)  Status unchanged.  Chronic pain management.    5. Essential (primary) hypertension  Measures to goal.    6. Lumbar degenerative disc disease  Unchanged.  Pain management.        Return in about 6 weeks (around 9/6/2024), or if symptoms worsen or fail to improve.    Return in about 6 weeks (around 9/6/2024), or if symptoms worsen or fail to improve.

## 2024-07-31 ENCOUNTER — TELEPHONE (OUTPATIENT)
Dept: CARDIOLOGY CLINIC | Facility: HOSPITAL | Age: 79
End: 2024-07-31

## 2024-07-31 DIAGNOSIS — I50.9 ACUTE ON CHRONIC CONGESTIVE HEART FAILURE, UNSPECIFIED HEART FAILURE TYPE (HCC): Primary | ICD-10-CM

## 2024-08-01 ENCOUNTER — LAB ENCOUNTER (OUTPATIENT)
Dept: LAB | Facility: HOSPITAL | Age: 79
End: 2024-08-01
Attending: NURSE PRACTITIONER
Payer: MEDICARE

## 2024-08-01 ENCOUNTER — OFFICE VISIT (OUTPATIENT)
Dept: CARDIOLOGY CLINIC | Facility: HOSPITAL | Age: 79
End: 2024-08-01
Attending: NURSE PRACTITIONER
Payer: MEDICARE

## 2024-08-01 VITALS
HEART RATE: 59 BPM | OXYGEN SATURATION: 96 % | DIASTOLIC BLOOD PRESSURE: 44 MMHG | RESPIRATION RATE: 16 BRPM | SYSTOLIC BLOOD PRESSURE: 72 MMHG | BODY MASS INDEX: 19 KG/M2 | WEIGHT: 112.13 LBS

## 2024-08-01 DIAGNOSIS — Z95.2 S/P MVR (MITRAL VALVE REPLACEMENT): ICD-10-CM

## 2024-08-01 DIAGNOSIS — J90 PLEURAL EFFUSION: ICD-10-CM

## 2024-08-01 DIAGNOSIS — I50.9 ACUTE ON CHRONIC CONGESTIVE HEART FAILURE, UNSPECIFIED HEART FAILURE TYPE (HCC): ICD-10-CM

## 2024-08-01 DIAGNOSIS — I50.23 ACUTE ON CHRONIC HFREF (HEART FAILURE WITH REDUCED EJECTION FRACTION) (HCC): Chronic | ICD-10-CM

## 2024-08-01 DIAGNOSIS — N17.9 AKI (ACUTE KIDNEY INJURY) (HCC): ICD-10-CM

## 2024-08-01 DIAGNOSIS — N18.4 CKD (CHRONIC KIDNEY DISEASE) STAGE 4, GFR 15-29 ML/MIN (HCC): Chronic | ICD-10-CM

## 2024-08-01 DIAGNOSIS — D50.9 IRON DEFICIENCY ANEMIA, UNSPECIFIED IRON DEFICIENCY ANEMIA TYPE: ICD-10-CM

## 2024-08-01 DIAGNOSIS — N17.9 ACUTE KIDNEY INJURY (HCC): ICD-10-CM

## 2024-08-01 DIAGNOSIS — I07.1 SEVERE TRICUSPID REGURGITATION: ICD-10-CM

## 2024-08-01 DIAGNOSIS — I95.2 HYPOTENSION DUE TO DRUGS: ICD-10-CM

## 2024-08-01 DIAGNOSIS — I50.9 ACUTE ON CHRONIC CONGESTIVE HEART FAILURE, UNSPECIFIED HEART FAILURE TYPE (HCC): Primary | ICD-10-CM

## 2024-08-01 LAB
ANION GAP SERPL CALC-SCNC: 11 MMOL/L (ref 0–18)
BUN BLD-MCNC: 61 MG/DL (ref 9–23)
BUN/CREAT SERPL: 20.5 (ref 10–20)
CALCIUM BLD-MCNC: 9.5 MG/DL (ref 8.7–10.4)
CHLORIDE SERPL-SCNC: 98 MMOL/L (ref 98–112)
CO2 SERPL-SCNC: 26 MMOL/L (ref 21–32)
CREAT BLD-MCNC: 2.98 MG/DL
EGFRCR SERPLBLD CKD-EPI 2021: 16 ML/MIN/1.73M2 (ref 60–?)
FASTING STATUS PATIENT QL REPORTED: NO
GLUCOSE BLD-MCNC: 94 MG/DL (ref 70–99)
OSMOLALITY SERPL CALC.SUM OF ELEC: 297 MOSM/KG (ref 275–295)
POTASSIUM SERPL-SCNC: 4.4 MMOL/L (ref 3.5–5.1)
SODIUM SERPL-SCNC: 135 MMOL/L (ref 136–145)

## 2024-08-01 PROCEDURE — 36415 COLL VENOUS BLD VENIPUNCTURE: CPT

## 2024-08-01 PROCEDURE — 80048 BASIC METABOLIC PNL TOTAL CA: CPT

## 2024-08-01 PROCEDURE — 99215 OFFICE O/P EST HI 40 MIN: CPT | Performed by: NURSE PRACTITIONER

## 2024-08-01 RX ORDER — BUMETANIDE 1 MG/1
1 TABLET ORAL DAILY
Qty: 30 TABLET | Refills: 2 | Status: SHIPPED | OUTPATIENT
Start: 2024-08-01

## 2024-08-01 RX ORDER — CARVEDILOL 6.25 MG/1
3.12 TABLET ORAL 2 TIMES DAILY WITH MEALS
Qty: 30 TABLET | Refills: 2 | Status: SHIPPED | OUTPATIENT
Start: 2024-08-01

## 2024-08-01 NOTE — PROGRESS NOTES
Subjective:  Margaret is here with her  and DTR. Came in per w/c. Assisted to stand on scale. C/o pain and soreness from wound on her sacrum. Home health RN to see her today and can address that. Denies any dizziness, SOB. States her lips are dry often.     Weight:  Today - 112.1 lb    Home  Last visit - 129#  Labs completed : at lab - BMP   Device:  CardioMEMS Interrogation - n/a    IV placed:  - no   Measurements :  n/a , no LLE swelling present  Juice and crackers  given to elevate BP. Has not eaten today yet. More juice and snack given.  Called pharmacy to verifiy that she picked up coreg and midodrine on 7/25.     Patient and medication assessed. Discussed with APN. Treatments completed- bp treatment , case management, repeat bp's. .  Medications given- none . Extensive education of disease management .  Reviewed AVS instructions. Patient verbalized understanding.      1351- called and s/w cortez ( dtr), did not want to review med, states she taped over the list at home with Davina's current list and instructed her mom to follow that.   Faxed blood test order to [facility].

## 2024-08-01 NOTE — PATIENT INSTRUCTIONS
Restart taking midodrine 5 mg tablets, one tablet twice daily with meals, with breakfast and dinner.     Decrease bumex to 1 mg tab daily starting today    Decrease coreg (carvedilol) to 3.125 mg or half of 6.25 mg tab twice daily     Check home blood pressure every morning, hold isosorbide if blood pressure is less than 100 systolic(top #.)    Continue all your other medications as prescribed     Blood test for basic metabolic panel in 1 week with home health nurse     Call if you are having shortness of breath, cough, wheezing, chest pain, dizziness, lightheadedness, heart racing, palpitations, swelling, rapid weight gain, fatigue, weakness, fever or chills.  Call 911 with severe shortness of breath, chest pain or chest pressure not improved after 15 minutes of rest or if feeling faint,  passing out or having a fall     Weigh yourself daily in the morning before breakfast, call if gaining 3 lbs or more overnight or more than 5 lbs in one week.    Follow 2000 mg sodium restricted, heart healthy diet. Eat small more frequent meals - 6 times a day and drink a protein drink like ensure at least once a day, can half half a bottle split up twice a day     Keep daily fluids at 48-64 ounces per day (1.5-2 liters of liquid or 6-8 , 8 oz glasses of liquid)    Follow up with Dr. Boles in the next 2-3 weeks    Follow up with the specialty care clinic in 1 month    Always carry a copy of your current medication list with you    Limit sodium to  2000 mg daily. Common high sodium foods include frozen dinners, soups (not homemade), some cereal, vegetable juice, canned vegetables, lunch meats, processed meats like hotdogs, sausage, vila, pepperoni, soy sauce, pre-packaged rice or potatoes. Please remember to read nutrition labels for sodium content.   www.healthyeating.nhlbi.nih.gov    Exercise daily as tolerated, with goal of doing moderate aerobic exercise like walking for about 30 minutes 5 days per week. Start by walking at a  slow to moderate pace for 3-5 minutes 2-3 times a day. Pace your activity to prevent shortness of breath or fatigue. Stop exercise if you develop chest pain, lightheadedness, or significant shortness of breath

## 2024-08-01 NOTE — PROGRESS NOTES
[facility]    Margaret Silverio Patient Status:  Outpatient    3/14/1945 MRN [id number]   Location [facility] DO Dr. [address] Dr. Jonny Silverio is a 79 year old female who presents to clinic for assessment and management for hospitalization for acute on chronic heart failure with severe tricuspid regurg.     Admitted 7/15-24 with worsening shortness of breath.  CXR noted large bilateral pleural effusions L > R.  BNP.  And acute biventricular CHF.  Treated with dobutamine drip and Bumex drip.  Diuresed 9 lbs. S/p thoracentesis 24, 1 L of transudative fluid removed. RHC with severe volume overload, RV 28/29, Katey 22 mmHg, PCWP 16 mmHg, PA 47/21, CI 2.2 . Home bumex 1 mg bid and midodrine 5 mg bid.   Seen by palliative care for chronic pain issues and goals of care. Not ready for hospice, to follow out-pt palliative care.       Admitted -24 with increased webb and wt gin. CXR noted pulmonary congestion, BNP 3395. Diuresed with IV bumex and IV milrinone.  EF improved to 30-35% from 15-20 % with severe TR. Consider BiV ICD upgrade as out-pt.    Admitted -6/3/24 with ARF with hypoxemia 2/2 to acute CHF and anemia. Required bipap, developed GEORGE after IV diuresis. Transfused PRBC x1.  Home on bumex 1 mg daily and isosorbide dinitrate.     Admitted 5/15-24 with hypotension , GEORGE, chronic anemia.  Improved with IV fluids, acute GI bleed r/o.     Admitted -24 with ARF with hypoxemia, cardiogenic shock and acute on chronic HF.  EF 15%, severe TR. Improved with milrinone drip and IV bumex. 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     Admitted -/24 with Melena,  acute GI bleeding, pneumonia with sepsis and acute on chronic HF.  Eliquis dc'd. Furosemide increased to 40 mg daily.       Admitted  -23 with worsening webb and chest discomfort.  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  Pro bnp 42,000, wbc 11.5, troponin negative. Cxr with small bilateral pleural effusions R>L and mil bibasilar opacities. Was also in afib RVR and blood pressure uncontrolled. Improved after IV labetolol, IV furosemide, creatinine elevated to 1.36, cardiology and nephrology consulted. Given venofer x2  for ANGÉLICA. Switched to torsemide 20 mg daily, coreg increased to 25 mg bid, restarted on eliquis 5 mg bid, continued entresto 49/51 mg bid.     Problem List:  Acute on chronic HFpEF, NYHA class IV, Stage D, LVEF 30-35%, improved from 15-20%, Wide-Open TR   Non ischemic cardiomyopathy  Dual chamber ICD, 9/2023.  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 Did not eat yet today    Chronic neck pain, improved with lidocaine patch cannot tolerate fentanyl patch, too sleepy for days   Greatly improved webb , denies cough, wheezing    Denies cp, heart racing, orthopnea, dizziness, bloating, minimal LE edema. Denies falling.   No orthopnea with 3 pillows. Only taking a few steps with 1-2 assist.    Unsure if taking midodrine, she did  from pharmacy, Dr. Sargent's med list did not have midodrine and only has coreg 6.25 mg daily and taking only once daily.    Review of Systems:  Constitutional: negative for chills or fever  Respiratory:  negative for cough, hemoptysis  Cardiovascular: negative for chest pain, exertional chest pressure/discomfort, near-syncope, orthopnea and palpitations  Gastrointestinal:  negative for abdominal pain, diarrhea, melena, nausea and vomiting  Hematologic/lymphatic: negative  Musculoskeletal: negative for muscle weakness. See above     Objective:  Lab Results   Component Value Date/Time    WBC 4.3 07/20/2024 05:32 AM    HGB 9.4 (L) 07/20/2024 05:32 AM    HCT 30.1 (L) 07/20/2024 05:32 AM    .0 07/20/2024 05:32 AM    CREATSERUM 2.98 (H) 08/01/2024 10:30 AM    BUN 61 (H) 08/01/2024 10:30 AM     (L) 08/01/2024 10:30 AM    K 4.4 08/01/2024 10:30 AM    CL 98 08/01/2024 10:30 AM    CO2 26.0 08/01/2024 10:30 AM    GLU 94 08/01/2024 10:30 AM    CA 9.5 08/01/2024 10:30 AM    ALB 4.0 07/01/2024 10:53 AM    ALKPHO 328 (H) 06/18/2024 01:29 PM    BILT 0.8 06/18/2024 01:29 PM    TP 7.7 06/18/2024 01:29 PM    AST 23 06/18/2024 01:29 PM    ALT 11 06/18/2024 01:29 PM    PTT 35.2 02/09/2024 02:58 PM    INR 1.25 (H) 02/09/2024 02:58 PM    PTP 16.5 (H) 02/09/2024 02:58 PM    T4F 1.4 06/18/2024 01:29 PM    TSH 7.534 (H) 06/18/2024 01:29 PM    LIP 32 01/13/2024 05:36 AM    ESRML 40 (H) 07/17/2024 06:46 AM    CRP 9.00 (H) 07/17/2024 06:46 AM    MG 2.0 07/20/2024 05:32 AM    PHOS 3.8 07/01/2024 10:53 AM    B12 >2,000 (H) 07/01/2024 10:53 AM    PGLU 106 (H) 05/24/2024 09:02 PM       Labs drawn : BMP, BNP, results reviewed with patient, independently interpreted results    BP (!) 72/44   Pulse 59   Resp 16   Wt 112 lb 1.6 oz (50.8 kg)   SpO2 96%   BMI 19.24 kg/m²       Date  Clinic wt Home wt MCI wt DC wt IV med  PO med   6/6/23 141        6/15/24 146 141       6/18/24   126      6/21/24    140     7/1/24 129   ---       7/9/24 124.8   ---   Bumex 2 mg IV push given     7/22/24    115     8/1/24 112            General appearance: alert, appears older than stated age and cooperative  Neck: + JVD below jawline  Lungs: clear to auscultation bilaterally, dim desirae bases  Heart: S1, S2 normal, +  murmur, click, no rub or gallop, regular rate and irregular rhythm  Abdomen: soft, non-tender; bowel sounds  normal; no masses,  no abdominal distension  Extremities: trace desirae ankle/pedal  edema  Pulses: 2+ and symmetric  Neurologic: Grossly normal  Measurements:   24: trace desirae LE edema  24: RLE Calf: 11.5\" Ankle: 7.5\"    LLE Calf: 11.5\" Ankle: 7.25\"   24: RLE calf- 13\" ankle- 7.75\"     LLE calf- 13.2\" ankle- 7.75\"     Diagnostics:  EK24 A sensed, V paced 71 bpm.   24: AV paced.    Echo: 24:  EF 30-35%, Severe LA and mild R atrial dilation, mild aortic stenosis, bioprosthetic mitral valve with normal function, no significant regurg. Wide open TR. EF improved from last echo    Echo 2/10/2024:  EF 15-20%, grade 4 DD, severe RVH, , moderate R dysfunction, bilateral atrial dilation, echodensity in LA, normal functioning bioprosthetic Mitral valve,  wide open TR.     RHC: 1717 - RV 28/29, Katey 22 mmHg, PCWP 16 mmHg, PA 47/21, CI 2.2     R/L Heart cath: 10/2022 LVEDP 14 mmHg, no cad.  RA 11  RV 38/6  PA 40/13 mean 28  PCW 18     CO 4.1/ CI 2.3  SVR 1839/       65 minutes spent on patient education, chart review and documentation for visit.    Patient and family instructed regarding clinic procedures, hours, purpose of clinic visits, sodium restricted diet, low sodium foods, fluid restrictions, daily weights, medication regimen s/s of heart failure exacerbation, dyspnea and hypoxemia and when to call APN/clinic.discussed benefits of palliative care treatment, referral given.  Provided counseling, coordination of care and education given. Patient and family receptive.    Assessment:  Devices:   [ x ]ICD  [ ]BIV-ICD  [  ]PPM  [  ]Life Vest  [  ]CardioMEMS    Acute on chronic HF recovered EF, NYHA class III, stage D, dual chamber ICD and chronic pulmonary edema with severe MR.   -EF improved to from 30-35%,(24) from 10-15%, with wide open tricuspid regurg  -non ischemic cardiomyopathy and history of tachycardia induced cardiomyopathy  -history of MV replacement in 2017, mild  MR  -frequent hospital admission for hypervolemia, recent admission 7/15/24 requiring dobutamine and bumex drip, decreased bumex to 1 mg daily. Severe L pleural effusion, 1 L fluid off. Started on midodrine 5 mg bid for hypotension.    -RHC 7/17/24 - RV 28/29, mean RA 22 mmHg, PCWP 16 mmHg, PA 47/21, CI 2.2   -diuretics: bumex 1 mg twice daily  -GDMT: entresto, coreg, spironolactone, isosorbide, farxiga    -not taking midodrine, was not on her med list after seeing Dr. Sargent and pt stopped taking.   - BP  67/45 on arrival--72/44  -Renal function has worsened, bun/cr: 61/2.98<-47/2.14<-44/1.77 <-43/1.64 <-45/1.8  -hypotensive with worsening renal function, BP 64-72/44, does not think she's taking midodrine (was not on med list after seeing Dr. Sargent 7/25, thought was to stop)  -wt trending down, poor appetite  - improved webb , near euvolemia with hypotension, asymptomatic  -declining ED, pt and family let for home during fire alarm, calling pt for update after arriving home.  -32 oz oral fluids given  -decrease bumex to 1 mg daily  -start midodrine 5 mg bid  -decrease coreg to 3.125 mg ( half of 6.25 mg tab) twice daily  -continue farxiga, entresto, spironolactone 12.5 mg daily   -medical management for severe MR  -f/u with Dr. Boles in 2 weeks  - F/U in [facility] in 1 week with repeat bmp   -saw palliative care, to f/u out-pt, not ready for hospice  -HHRN seeing today will call with BP after eating and with pt updates      GEORGE/CKD stage IIIb  -renal function worsened with hypotension  - bun/cr: 60/2.98 , K 4.4 today  - baseline cr 1.4-1.63  -see above  -sees Dr Arvizu     Paroxysmal atrial fib/flutter  - AV pacing   -on amiodarone  -eliquis discontinued with hx of GI bleed   -  seeing  Dr. Sargent     Chronic anemia  -HGB improved to 9.1  - Iron sat 29 %, ferritin 780, serum iron 68 7/1/24.   -continue ferrous sulfate daily  -aranesp per Dr. Arvizu  - hx of transfusions with GI bleed    Acute respiratory with  hypoxemia  -resolved  -continue home 02 1-2 L  prn      Plan:   Restart taking midodrine 5 mg tablets, one tablet twice daily with meals, with breakfast and dinner.     Decrease bumex to 1 mg tab daily starting today    Decrease coreg (carvedilol) to 3.125 mg or half of 6.25 mg tab twice daily     Check home blood pressure every morning, hold isosorbide if blood pressure is less than 100 systolic(top #.)    Continue medications as prescribed     BMP in 1 week with home health RN    Call if having shortness of breath, cough, wheezing, chest pain, dizziness, lightheadedness, heart racing, palpitations, swelling, weight gain, fatigue or weakness    Call 911 with severe shortness of breath, chest pain or chest pressure not improved after 15 minutes of rest or if feeling faint,  passing out or having a fall     Weigh yourself daily in the morning before breakfast, call if gaining 3 lbs or more overnight or more than 5 lbs in one week.    Follow 2000 mg sodium restricted , heart healthy diet, Keep daily fluids at 48-64 ounces per day    Follow up with Elvi in 2 weeks    Follow up with the specialty care clinic in 1 month      Limit sodium to  2000 mg daily. Common high sodium foods include frozen dinners, soups (not homemade), some cereal, vegetable juice, canned vegetables, lunch meats, processed meats like hotdogs, sausage, vila, pepperoni, soy sauce, pre-packaged rice or potatoes. Please remember to read nutrition labels for sodium content.     www.healthyeating.nhlbi.nih.gov      Exercise daily as tolerated, with goal of doing moderate aerobic exercise like walking for about 30 minutes 5 days per week. Start by walking at a slow to moderate pace for 5-10 minutes 2-3 times a day. Pace your activity to prevent shortness of breath or fatigue. Stop exercise if you develop chest pain, lightheadedness, or significant shortness of breath      Current Outpatient Medications:     bumetanide 1 MG Oral Tab, Take 1 tablet (1 mg  total) by mouth daily., Disp: 30 tablet, Rfl: 2    carvedilol 6.25 MG Oral Tab, Take 0.5 tablets (3.125 mg total) by mouth 2 (two) times daily with meals., Disp: 30 tablet, Rfl: 2    sennosides (SENNA-TIME) 8.6 MG Oral Tab, senna 8.6 mg tablet, [RxNorm: 717136], Disp: , Rfl:     HYDROcodone-acetaminophen (NORCO)  MG Oral Tab, Take 1 tablet by mouth every 8 (eight) hours as needed for Pain., Disp: 60 tablet, Rfl: 0    midodrine 5 MG Oral Tab, Take 1 tablet (5 mg total) by mouth 2 (two) times daily before meals., Disp: 60 tablet, Rfl: 0    senna-docusate 8.6-50 MG Oral Tab, Take 2 tablets by mouth daily., Disp: 30 tablet, Rfl: 0    Naloxone HCl 4 MG/0.1ML Nasal Liquid, 4 mg by Nasal route as needed. If patient remains unresponsive, repeat dose in other nostril 2-5 minutes after first dose., Disp: 1 kit, Rfl: 0    isosorbide dinitrate 20 MG Oral Tab, Take 1 tablet (20 mg total) by mouth TID (Nitrates)., Disp: 90 tablet, Rfl: 3    gabapentin 300 MG Oral Cap, Take 1 capsule (300 mg total) by mouth 3 (three) times daily., Disp: 90 capsule, Rfl: 0    dapagliflozin (FARXIGA) 10 MG Oral Tab, Take 1 tablet (10 mg total) by mouth daily., Disp: 30 tablet, Rfl: 5    spironolactone 25 MG Oral Tab, , Disp: , Rfl:     alendronate 70 MG Oral Tab, Take 1 tablet (70 mg total) by mouth once a week., Disp: 12 tablet, Rfl: 3    methotrexate 2.5 MG Oral Tab, TAKE 6 TABLETS BY MOUTH 1 TIME A WEEK, Disp: 77 tablet, Rfl: 0    lidocaine-menthol 4-1 % External Patch, Place 1 patch onto the skin daily., Disp: 30 patch, Rfl: 0    PANTOPRAZOLE 40 MG Oral Tab EC, TAKE 1 TABLET(40 MG) BY MOUTH TWICE DAILY BEFORE MEALS, Disp: 180 tablet, Rfl: 0    SACUBITRIL-VALSARTAN 49-51 MG Oral Tab, Take 1 tablet by mouth 2 (two) times daily., Disp: 60 tablet, Rfl: 1    folic acid 800 MCG Oral Tab, Take 1 tablet (800 mcg total) by mouth daily., Disp: 90 tablet, Rfl: 0    amiodarone 200 MG Oral Tab, Take 1 tablet (200 mg total) by mouth daily., Disp: , Rfl:      ferrous sulfate 325 (65 FE) MG Oral Tab EC, Take 1 tablet (325 mg total) by mouth daily with breakfast., Disp: , Rfl:     cyclobenzaprine 10 MG Oral Tab, Take 1 tablet (10 mg total) by mouth nightly., Disp: 30 tablet, Rfl: 1    fentaNYL 12 MCG/HR Transdermal Patch 72 Hr, Place 1 patch onto the skin every third day. (Patient not taking: Reported on 8/1/2024), Disp: 10 patch, Rfl: 0    polyethylene glycol, PEG 3350, 17 g Oral Powd Pack, Take 17 g by mouth daily as needed. (Patient not taking: Reported on 8/1/2024), Disp: , Rfl:     acetaminophen (TYLENOL EXTRA STRENGTH) 500 MG Oral Tab, Take 1 tablet (500 mg total) by mouth every 6 (six) hours as needed for Pain., Disp: , Rfl:     gabapentin 300 MG Oral Cap, Take 1 capsule (300 mg total) by mouth 2 (two) times daily. (Patient not taking: Reported on 8/1/2024), Disp: 60 capsule, Rfl: 0      MAURA LEE NP, 08/01/24

## 2024-08-02 ENCOUNTER — TELEPHONE (OUTPATIENT)
Dept: HEMATOLOGY/ONCOLOGY | Facility: HOSPITAL | Age: 79
End: 2024-08-02

## 2024-08-02 NOTE — TELEPHONE ENCOUNTER
I called Margaret ([phone]) and her daughter Barbi ([phone]) to schedule an outpatient Palliative Care appointment for Margaret re: pain management and GOC.     No answer at either number. Messages left on both numbers.     Awaiting calls back.

## 2024-08-06 NOTE — PROGRESS NOTES
Multiple attempts to reach patient and messages left with no return call.  Patient went in for hospital follow-up appointment with primary care provider on 7/26/24.  Encounter closing.

## 2024-08-12 ENCOUNTER — APPOINTMENT (OUTPATIENT)
Dept: CT IMAGING | Facility: HOSPITAL | Age: 79
DRG: 853 | End: 2024-08-12
Attending: EMERGENCY MEDICINE
Payer: MEDICARE

## 2024-08-12 ENCOUNTER — HOSPITAL ENCOUNTER (INPATIENT)
Facility: HOSPITAL | Age: 79
LOS: 29 days | Discharge: HOSPICE | DRG: 853 | End: 2024-09-10
Attending: EMERGENCY MEDICINE | Admitting: HOSPITALIST
Payer: MEDICARE

## 2024-08-12 DIAGNOSIS — D64.9 ANEMIA, UNSPECIFIED TYPE: ICD-10-CM

## 2024-08-12 DIAGNOSIS — L89.159 PRESSURE INJURY OF SKIN OF SACRAL REGION, UNSPECIFIED INJURY STAGE: Primary | ICD-10-CM

## 2024-08-12 DIAGNOSIS — N39.0 URINARY TRACT INFECTION WITHOUT HEMATURIA, SITE UNSPECIFIED: ICD-10-CM

## 2024-08-12 LAB
ANION GAP SERPL CALC-SCNC: 8 MMOL/L (ref 0–18)
ANTIBODY SCREEN: NEGATIVE
BASOPHILS # BLD AUTO: 0.01 X10(3) UL (ref 0–0.2)
BASOPHILS NFR BLD AUTO: 0.8 %
BILIRUB UR QL: NEGATIVE
BUN BLD-MCNC: 59 MG/DL (ref 9–23)
BUN/CREAT SERPL: 21.9 (ref 10–20)
CALCIUM BLD-MCNC: 9.1 MG/DL (ref 8.7–10.4)
CHLORIDE SERPL-SCNC: 100 MMOL/L (ref 98–112)
CLARITY UR: CLEAR
CO2 SERPL-SCNC: 25 MMOL/L (ref 21–32)
COLOR UR: YELLOW
CREAT BLD-MCNC: 2.69 MG/DL
DEPRECATED RDW RBC AUTO: 61.5 FL (ref 35.1–46.3)
EGFRCR SERPLBLD CKD-EPI 2021: 18 ML/MIN/1.73M2 (ref 60–?)
EOSINOPHIL # BLD AUTO: 0.11 X10(3) UL (ref 0–0.7)
EOSINOPHIL NFR BLD AUTO: 8.8 %
ERYTHROCYTE [DISTWIDTH] IN BLOOD BY AUTOMATED COUNT: 17.9 % (ref 11–15)
GLUCOSE BLD-MCNC: 92 MG/DL (ref 70–99)
GLUCOSE UR-MCNC: 70 MG/DL
HCT VFR BLD AUTO: 23.6 %
HGB BLD-MCNC: 7.4 G/DL
HGB UR QL STRIP.AUTO: NEGATIVE
IMM GRANULOCYTES # BLD AUTO: 0.06 X10(3) UL (ref 0–1)
IMM GRANULOCYTES NFR BLD: 4.8 %
LEUKOCYTE ESTERASE UR QL STRIP.AUTO: 500
LYMPHOCYTES # BLD AUTO: 0.32 X10(3) UL (ref 1–4)
LYMPHOCYTES NFR BLD AUTO: 25.6 %
MCH RBC QN AUTO: 31.2 PG (ref 26–34)
MCHC RBC AUTO-ENTMCNC: 31.4 G/DL (ref 31–37)
MCV RBC AUTO: 99.6 FL
MONOCYTES # BLD AUTO: 0.12 X10(3) UL (ref 0.1–1)
MONOCYTES NFR BLD AUTO: 9.6 %
NEUTROPHILS # BLD AUTO: 0.63 X10 (3) UL (ref 1.5–7.7)
NEUTROPHILS # BLD AUTO: 0.63 X10(3) UL (ref 1.5–7.7)
NEUTROPHILS NFR BLD AUTO: 50.4 %
NITRITE UR QL STRIP.AUTO: NEGATIVE
OSMOLALITY SERPL CALC.SUM OF ELEC: 292 MOSM/KG (ref 275–295)
PH UR: 6 [PH] (ref 5–8)
PLATELET # BLD AUTO: 103 10(3)UL (ref 150–450)
PLATELETS.RETICULATED NFR BLD AUTO: 7.3 % (ref 0–7)
POTASSIUM SERPL-SCNC: 4.2 MMOL/L (ref 3.5–5.1)
PROT UR-MCNC: 30 MG/DL
RBC # BLD AUTO: 2.37 X10(6)UL
RH BLOOD TYPE: POSITIVE
SODIUM SERPL-SCNC: 133 MMOL/L (ref 136–145)
SP GR UR STRIP: 1.01 (ref 1–1.03)
UROBILINOGEN UR STRIP-ACNC: NORMAL
WBC # BLD AUTO: 1.3 X10(3) UL (ref 4–11)

## 2024-08-12 PROCEDURE — 74176 CT ABD & PELVIS W/O CONTRAST: CPT | Performed by: EMERGENCY MEDICINE

## 2024-08-12 PROCEDURE — 99223 1ST HOSP IP/OBS HIGH 75: CPT | Performed by: HOSPITALIST

## 2024-08-12 PROCEDURE — 99222 1ST HOSP IP/OBS MODERATE 55: CPT | Performed by: SURGERY

## 2024-08-12 RX ORDER — CYCLOBENZAPRINE HCL 10 MG
10 TABLET ORAL NIGHTLY
Status: DISCONTINUED | OUTPATIENT
Start: 2024-08-12 | End: 2024-08-20

## 2024-08-12 RX ORDER — ACETAMINOPHEN 500 MG
500 TABLET ORAL EVERY 4 HOURS PRN
Status: DISCONTINUED | OUTPATIENT
Start: 2024-08-12 | End: 2024-08-12

## 2024-08-12 RX ORDER — MIDODRINE HYDROCHLORIDE 5 MG/1
5 TABLET ORAL
Status: DISCONTINUED | OUTPATIENT
Start: 2024-08-13 | End: 2024-08-16

## 2024-08-12 RX ORDER — SPIRONOLACTONE 25 MG/1
25 TABLET ORAL DAILY
Status: DISCONTINUED | OUTPATIENT
Start: 2024-08-13 | End: 2024-08-13

## 2024-08-12 RX ORDER — SODIUM HYPOCHLORITE 1.25 MG/ML
SOLUTION TOPICAL 2 TIMES DAILY
Status: DISCONTINUED | OUTPATIENT
Start: 2024-08-12 | End: 2024-09-10

## 2024-08-12 RX ORDER — BUMETANIDE 1 MG/1
1 TABLET ORAL DAILY
Status: DISCONTINUED | OUTPATIENT
Start: 2024-08-12 | End: 2024-08-14

## 2024-08-12 RX ORDER — HYDROCODONE BITARTRATE AND ACETAMINOPHEN 10; 325 MG/1; MG/1
1 TABLET ORAL EVERY 8 HOURS PRN
Status: DISCONTINUED | OUTPATIENT
Start: 2024-08-12 | End: 2024-08-23

## 2024-08-12 RX ORDER — ISOSORBIDE DINITRATE 20 MG/1
20 TABLET ORAL
Status: DISCONTINUED | OUTPATIENT
Start: 2024-08-12 | End: 2024-09-06

## 2024-08-12 RX ORDER — FENTANYL 12.5 UG/1
1 PATCH TRANSDERMAL
Status: DISCONTINUED | OUTPATIENT
Start: 2024-08-12 | End: 2024-08-27

## 2024-08-12 RX ORDER — METOCLOPRAMIDE HYDROCHLORIDE 5 MG/ML
5 INJECTION INTRAMUSCULAR; INTRAVENOUS EVERY 8 HOURS PRN
Status: DISCONTINUED | OUTPATIENT
Start: 2024-08-12 | End: 2024-09-10

## 2024-08-12 RX ORDER — MELATONIN
800 DAILY
Status: DISCONTINUED | OUTPATIENT
Start: 2024-08-12 | End: 2024-09-10

## 2024-08-12 RX ORDER — PANTOPRAZOLE SODIUM 40 MG/1
40 TABLET, DELAYED RELEASE ORAL
Status: DISCONTINUED | OUTPATIENT
Start: 2024-08-13 | End: 2024-09-04

## 2024-08-12 RX ORDER — CARVEDILOL 3.12 MG/1
3.12 TABLET ORAL 2 TIMES DAILY WITH MEALS
Status: DISCONTINUED | OUTPATIENT
Start: 2024-08-12 | End: 2024-08-16

## 2024-08-12 RX ORDER — ACETAMINOPHEN 500 MG
500 TABLET ORAL EVERY 4 HOURS PRN
Status: DISCONTINUED | OUTPATIENT
Start: 2024-08-12 | End: 2024-09-10

## 2024-08-12 RX ORDER — GABAPENTIN 300 MG/1
300 CAPSULE ORAL 3 TIMES DAILY
Status: DISCONTINUED | OUTPATIENT
Start: 2024-08-12 | End: 2024-09-10

## 2024-08-12 RX ORDER — ONDANSETRON 2 MG/ML
4 INJECTION INTRAMUSCULAR; INTRAVENOUS EVERY 6 HOURS PRN
Status: DISCONTINUED | OUTPATIENT
Start: 2024-08-12 | End: 2024-09-10

## 2024-08-12 RX ORDER — FERROUS SULFATE 325(65) MG
325 TABLET, DELAYED RELEASE (ENTERIC COATED) ORAL
Status: DISCONTINUED | OUTPATIENT
Start: 2024-08-13 | End: 2024-09-04

## 2024-08-12 RX ORDER — AMIODARONE HYDROCHLORIDE 200 MG/1
200 TABLET ORAL DAILY
Status: DISCONTINUED | OUTPATIENT
Start: 2024-08-12 | End: 2024-09-10

## 2024-08-12 NOTE — PROGRESS NOTES
General Surgery consult    Chart reviewed, full dictated consult to follow    Unstageable sacral decubitus ulcer with necrosis  -Plan admit, cardiology to see, plan debridement when cleared medically.  Npo p mn

## 2024-08-12 NOTE — ED QUICK NOTES
Orders for admission, patient is aware of plan and ready to go upstairs. Any questions, please call ED JEFFERY ramirez at extension [phone].     Patient Covid vaccination status: Fully vaccinated     COVID Test Ordered in ED: None    COVID Suspicion at Admission: N/A    Running Infusions:  None    Mental Status/LOC at time of transport: aox4    Other pertinent information:   CIWA score: N/A   NIH score:  N/A

## 2024-08-12 NOTE — CM/SW NOTE
08/12/24 1700   ALEXEY/BRADEN Referral Data   Referral Source    Reason for Referral Discharge planning;Readmission   Informant Daughter   Readmission Assessment   Factors that patient feels contributed to this readmission Acute/Chronic Clinical Presentation   Pt's living situation prior to admission? Home with family   Pt's level of independence at discharge? Some assist (mod)   Pt. received education on diagnoses at time of discharge? Yes   Did you know who and how to call someone if you felt worse? Yes   Did any new symptoms or issues develop after you were discharged? Yes   ----Post D/C symptoms: Symptoms/issue related to previous hospitalization Yes   Did you understand your discharge instructions? Yes   Were medications taken as indicated on discharge instructions? Yes   Was patient a candidate for: Home Health   ----Home Health Recommendation: Recommended, arranged   Was full assessment completed by BRADEN/ALEXEY on prior admission? Yes   Was the recommended discharge plan achieved? Yes   Was pt. discharged w/out services? No   Medical Hx   Does patient have an established PCP? Yes  (Johnathon Rodriguez)   Patient Info   Patient's Current Mental Status at Time of Assessment Confused or unable to complete assessment   Patient's Home Environment House   Patient lives with Spouse/Significant other   Patient Status Prior to Admission   Independent with ADLs and Mobility No   Pt. requires assistance with Ambulating;Driving;Finances;Housework;Bathing;Dressing   Services in place prior to admission Home Health Care;DME/Supplies at home   Home Health Provider Info [facility]  (RN, PT and OT unable to see patient)   DME Provider/Supplier HME  (Home O2)   Type of DME/Supplies Standard Walker;Wheelchair;Raised Toilet Seat   Discharge Needs   Anticipated D/C needs To be determined     Pt discussed during nursing rounds. Dx cystitis, sacral wound, I&D will be needed once medically cleared for sx. Home w/spouse,  independent w/walker prior to last admission. Pt's daughter notified CM that patient's mobility has been very limited since returning home from last admission. Current w/[facility]. Daughter reports patient has only been seen by RN since last admission, but PT was in contact w/daughter regarding starting services. HH referral sent in [facility], new F2F entered for RN/PT/OT. Current w/HME for home O2 though patient has not used O2 at home since 7/22. Pt currently on room air. [facility] was assigned for [facility] last admission, but pt's daughter notified CM that services never started since pt returned home. [facility] referral sent to [facility] in [facility]. PT/OT evals will be needed for dc recommendation.    Plan: TBD    / to remain available for support and/or discharge planning.     KIM Springer    [phone]

## 2024-08-12 NOTE — CM/SW NOTE
Received a call from the wound center. Pt has an appt for next week however they are unable to come and see the pt in the ER. MD made aware

## 2024-08-12 NOTE — ED INITIAL ASSESSMENT (HPI)
On Prevacid 30 mg daily   Patient complains of sacral wound for about three weeks, complains of dark, loose stools for about a fortnight, complains of pain to her buttocks

## 2024-08-12 NOTE — ED PROVIDER NOTES
Patient Seen in: [facility] Emergency Department      History     Chief Complaint   Patient presents with    Wound Care     Stated Complaint: Bed sore, diarrhea x2wks    Subjective:   HPI    79-year-old female with history of hypertension, congestive heart failure, chronic kidney disease, anemia, rheumatoid arthritis, and recent admission from 7/15-22 for acute on chronic congestive heart failure presents with complaints of large sacral wound and loose stool.  The patient reports that she began having a wound to her sacrum and buttocks area during her recent hospital admission.  She reports since her discharge to the wound has gotten larger with increased pain.  She is seen twice a week by a home health nurse who advised that she get evaluated for the large sacral wound.  The patient is largely bedbound but will get up and move to a chair for part of the day on most days.  She also reports that she has had mushy, loose stool and has been eating and drinking little because she is afraid of having bowel movements as it makes the wound more painful.  She denies any known fevers.  She had an initial appointment with the wound clinic today but came to the ED instead because she was feeling worse.    Objective:   Past Medical History:    Acute kidney insufficiency    Anemia    Arrhythmia    Back problem    CHF (congestive heart failure) (HCC)    CKD (chronic kidney disease) stage 3, GFR 30-59 ml/min (HCC)    Deep vein thrombosis (HCC)    on B.T for only a month, possibly [place]    Essential hypertension    High blood pressure    History of blood transfusion    Rheumatoid arthritis (HCC)    Seizure disorder (HCC)    Pt denied at screening call 6/28/24              Past Surgical History:   Procedure Laterality Date    Cabg      Cardiac pacemaker placement      Colonoscopy N/A 01/17/2024    Dr. Rios    Colonoscopy N/A 01/17/2024    Procedure: COLONOSCOPY;  Surgeon: Zoraida Rios MD;  Location: [facility] ENDOSCOPY    Egd N/A  01/17/2024    Dr. Rios    Fracture surgery      Other surgical history  2017    Heart Surgery     Other surgical history  2020    Bilateral shoulder replacement                 Social History     Socioeconomic History    Marital status:    Tobacco Use    Smoking status: Former     Current packs/day: 0.00     Types: Cigarettes     Quit date: 2014     Years since quitting: 10.6    Smokeless tobacco: Never   Vaping Use    Vaping status: Never Used   Substance and Sexual Activity    Alcohol use: Never    Drug use: Never     Social Determinants of Health     Financial Resource Strain: Low Risk  (6/4/2024)    Financial Resource Strain     Difficulty of Paying Living Expenses: Not very hard     Med Affordability: No   Food Insecurity: No Food Insecurity (7/15/2024)    Food Insecurity     Food Insecurity: Never true   Transportation Needs: No Transportation Needs (7/15/2024)    Transportation Needs     Lack of Transportation: No   Housing Stability: Low Risk  (7/15/2024)    Housing Stability     Housing Instability: No              Review of Systems    Positive for stated Chief Complaint: Wound Care    Other systems are as noted in HPI.  Constitutional and vital signs reviewed.      All other systems reviewed and negative except as noted above.    Physical Exam     ED Triage Vitals [08/12/24 0859]   /59   Pulse 63   Resp 18   Temp 98 °F (36.7 °C)   Temp src    SpO2 100 %   O2 Device None (Room air)       Current Vitals:   Vital Signs  BP: 110/59  Pulse: 63  Resp: 18  Temp: 98 °F (36.7 °C)    Oxygen Therapy  SpO2: 100 %  O2 Device: None (Room air)            Physical Exam      General Appearance: alert, no distress  Eyes: pupils equal and round no pallor or injection  ENT, Mouth: mucous membranes moist  Respiratory: there are no retractions, lungs are clear to auscultation  Cardiovascular: regular rate and rhythm  Gastrointestinal:  abdomen is soft and non tender, no masses, bowel sounds normal  Rectal: Small  amount of heme positive dark stool in the rectal vault.  Neurological: Speech normal.  Moving extremities x 4.  Skin: Large, stage III sacral wound extending to the medial aspects of bilateral buttocks.  The wound is foul-smelling but no active drainage is noted.  Musculoskeletal: neck is supple non tender        Extremities are symmetrical, full range of motion  Psychiatric: patient is oriented X 3, there is no agitation    ED Course     Labs Reviewed   CBC WITH DIFFERENTIAL WITH PLATELET - Abnormal; Notable for the following components:       Result Value    WBC 1.3 (*)     RBC 2.37 (*)     HGB 7.4 (*)     HCT 23.6 (*)     RDW-SD 61.5 (*)     RDW 17.9 (*)     .0 (*)     Immature Platelet Fraction 7.3 (*)     Neutrophil Absolute Prelim 0.63 (*)     Neutrophil Absolute 0.63 (*)     Lymphocyte Absolute 0.32 (*)     All other components within normal limits   BASIC METABOLIC PANEL (8) - Abnormal; Notable for the following components:    Sodium 133 (*)     BUN 59 (*)     Creatinine 2.69 (*)     BUN/CREA Ratio 21.9 (*)     eGFR-Cr 18 (*)     All other components within normal limits   URINALYSIS WITH CULTURE REFLEX - Abnormal; Notable for the following components:    Glucose Urine 70 (*)     Ketones Urine Trace (*)     Protein Urine 30 (*)     Leukocyte Esterase Urine 500 (*)     WBC Urine 21-50 (*)     Bacteria Urine 1+ (*)     All other components within normal limits   SCAN SLIDE   MD BLOOD SMEAR CONSULT   TYPE AND SCREEN    Narrative:     The following orders were created for panel order Type and screen.  Procedure                               Abnormality         Status                     ---------                               -----------         ------                     ABORH (Blood Type)[[id number]]                               Final result               Antibody Screen[[id number]]                                  Final result                 Please view results for these tests on the individual orders.    ABORH (BLOOD TYPE)   ANTIBODY SCREEN   RAINBOW DRAW BLUE   RAINBOW DRAW GOLD   URINE CULTURE, ROUTINE                    MDM      Lab and CT results noted.  Patient with large sacral wound without clear evidence of infection.  Also has what appears to be a urinary tract infection on urinalysis and CT scan.  Will begin empiric antibiotics and admit.  Patient also with worsening anemia.  Discussed with Dr. Sahni, hospitalist.  Also communicated with Dr. Mckeon, general surgery.  Admission disposition: 8/12/2024  1:23 PM                                        Medical Decision Making      Disposition and Plan     Clinical Impression:  1. Pressure injury of skin of sacral region, unspecified injury stage    2. Urinary tract infection without hematuria, site unspecified    3. Anemia, unspecified type         Disposition:  Admit  8/12/2024  1:23 pm    Follow-up:  No follow-up provider specified.  We recommend that you schedule follow up care with a primary care provider within the next three months to obtain basic health screening including reassessment of your blood pressure.      Medications Prescribed:  Current Discharge Medication List                            Hospital Problems       Present on Admission  Date Reviewed: 8/1/2024            ICD-10-CM Noted POA    * (Principal) Pressure injury of skin of sacral region, unspecified injury stage L89.159 8/12/2024 Unknown

## 2024-08-12 NOTE — H&P
[facility]    PATIENT'S NAME: DORIS ALEMAN   ATTENDING PHYSICIAN: Robert Sherman MD   PATIENT ACCOUNT#:   [id number]    LOCATION:  [place] [address]  MEDICAL RECORD #:   [id number]       YOB: 1945  ADMISSION DATE:       08/12/2024    HISTORY AND PHYSICAL EXAMINATION    CHIEF COMPLAINT:  Sacral decubitus ulcer with necrotic changes and pancytopenia.      HISTORY OF PRESENT ILLNESS:  Patient is a 79-year-old  female who presented to the emergency department bedbound secondary to severe debilitating rheumatoid arthritis, underlying nonischemic cardiomyopathy.  Was hospitalized recently and discharged after a heart failure episode and treated with diuretics and dobutamine.  She had right and left heart catheterization.  Also, she had pleurocentesis, 1 L removed.  Fluid did not show evidence of infection.  She was restarted on methotrexate and prednisone for her severe rheumatoid arthritis.  Today, came back with painful decubitus ulcer, which the patient said started developing while she was in the hospital last time.  CBC showed white blood cell count of 1.3, hemoglobin 7.4, and platelet count 103.  Urinalysis showed evidence of urinary tract infection.  Chemistry showed GFR of 18, which is at baseline; BUN and creatinine of 59 and 2.69.  CT scan of the abdomen showed bladder mural wall thickening suggestive of cystitis, small to moderate right and moderate left loculated pleural effusion, chronic pancreatitis.  Patient will be admitted to the hospital for further management.  Started on IV Rocephin in the emergency room.    PAST MEDICAL HISTORY:  Nonischemic cardiomyopathy, ejection fraction 30% to 35%, status post biventricular ICD and moderate mitral regurgitation; mild aortic stenosis with pulmonary artery hypertension.  Gastrointestinal bleed, secondary to arteriovenous malformation, was taken off anticoagulation.  Chronic kidney disease stage 3 to 4; anemia of  chronic kidney disease; hypertension; generalized osteoarthritis; history of DVT; rheumatoid arthritis; paroxysmal atrial fibrillation; bilateral pleural effusions.      PAST SURGICAL HISTORY:  Mitral valve replacement with atrial appendage closure and bilateral total shoulder arthroplasty.      MEDICATIONS:  Please see medication reconciliation list.     ALLERGIES:  Lisinopril.    FAMILY HISTORY:  Positive for hypertension.     SOCIAL HISTORY:  Bedbound.  Ex-tobacco user.  No current tobacco, alcohol, or drug use.  Requires assistance in her basic activities of daily living.     REVIEW OF SYSTEMS:  Generalized fatigue, weakness, pain in the lower sacral area after developing decubitus ulcer.  Also, she has loose bowel movements every time she eats.  Stool is slightly darker in color, tested today by the emergency room physician and had weak Hemoccult positive.      PHYSICAL EXAMINATION:    GENERAL:  Alert and oriented to time, place and person.  Appears slightly cachectic.   VITAL SIGNS:  Temperature 98.0, pulse 63, respiratory rate 18, blood pressure 110/59, pulse ox 100% on room air.  HEENT:  Atraumatic.  Oropharynx clear.    NECK:  Supple.  No lymphadenopathy.    LUNGS:  Diminished breathing sounds, both lung bases.  Otherwise, clear to auscultation bilaterally.    HEART:  Regular rate and rhythm.  S1 and S2 auscultated.  No murmur.    ABDOMEN:  Soft, nondistended.  No tenderness.  Positive bowel sounds.   EXTREMITIES:  Trace edema, both legs.  Otherwise, severe rheumatoid arthritis changes, both hands and wrists.  SKIN:  Sacral area with stage III to IV decubitus ulcer at the distribution of the more superior aspect of both buttocks and sacral area.   NEUROLOGIC:  Motor and sensory intact.      ASSESSMENT:    1.   Sacral stage III to IV decubitus ulcer with necrotic changes.  2.   Pancytopenia, most likely related to methotrexate use.   3.   Anemia, possible component of gastrointestinal blood loss, which is  chronic.   4.   Urinary tract infection.   5.   Chronic kidney disease stage 3 to 4.  6.   Nonischemic cardiomyopathy.    PLAN:  Patient will be admitted to general medical floor.  Does not seem to be in fluid overload status.  We will obtain surgical consult to evaluate her sacral decubitus wound for possible debridement.  Start her on IV Rocephin.  Monitor her blood count.  Hold or possibly discontinue methotrexate.  Further recommendations to follow.     Dictated By Azul Sahni MD  d: 08/12/2024 12:50:30  t: 08/12/2024 14:03:49  [facility] [id number]  FB/

## 2024-08-12 NOTE — PLAN OF CARE
Pt admitted from ED. Family at bedside. Buttocks wound traced, site care provided and mepilex dressing applied. Pt using purewick. Enteric precautions d/t having loose stools for several weeks, r/o c.diff. Fentanyl patch to R upper arm. Lidocaine patch to L neck. Pt has deformed hands from arthritis.     Problem: PAIN - ADULT  Goal: Verbalizes/displays adequate comfort level or patient's stated pain goal  Description: INTERVENTIONS:  - Encourage pt to monitor pain and request assistance  - Assess pain using appropriate pain scale  - Administer analgesics based on type and severity of pain and evaluate response  - Implement non-pharmacological measures as appropriate and evaluate response  - Consider cultural and social influences on pain and pain management  - Manage/alleviate anxiety  - Utilize distraction and/or relaxation techniques  - Monitor for opioid side effects  - Notify MD/LIP if interventions unsuccessful or patient reports new pain  - Anticipate increased pain with activity and pre-medicate as appropriate  Outcome: Progressing     Problem: RISK FOR INFECTION - ADULT  Goal: Absence of fever/infection during anticipated neutropenic period  Description: INTERVENTIONS  - Monitor WBC  - Administer growth factors as ordered  - Implement neutropenic guidelines  Outcome: Progressing     Problem: SAFETY ADULT - FALL  Goal: Free from fall injury  Description: INTERVENTIONS:  - Assess pt frequently for physical needs  - Identify cognitive and physical deficits and behaviors that affect risk of falls.  - [place] fall precautions as indicated by assessment.  - Educate pt/family on patient safety including physical limitations  - Instruct pt to call for assistance with activity based on assessment  - Modify environment to reduce risk of injury  - Provide assistive devices as appropriate  - Consider OT/PT consult to assist with strengthening/mobility  - Encourage toileting schedule  Outcome: Progressing     Problem:  SKIN/TISSUE INTEGRITY - ADULT  Goal: Incision(s), wounds(s) or drain site(s) healing without S/S of infection  Description: INTERVENTIONS:  - Assess and document risk factors for pressure ulcer development  - Assess and document skin integrity  - Assess and document dressing/incision, wound bed, drain sites and surrounding tissue  - Implement wound care per orders  - Initiate isolation precautions as appropriate  - Initiate Pressure Ulcer prevention bundle as indicated  Outcome: Progressing  Goal: Oral mucous membranes remain intact  Description: INTERVENTIONS  - Assess oral mucosa and hygiene practices  - Implement preventative oral hygiene regimen  - Implement oral medicated treatments as ordered  Outcome: Progressing     Problem: SKIN/TISSUE INTEGRITY - ADULT  Goal: Skin integrity remains intact  Description: INTERVENTIONS  - Assess and document risk factors for pressure ulcer development  - Assess and document skin integrity  - Monitor for areas of redness and/or skin breakdown  - Initiate interventions, skin care algorithm/standards of care as needed  Outcome: Not Progressing

## 2024-08-13 ENCOUNTER — ANESTHESIA (OUTPATIENT)
Dept: SURGERY | Facility: HOSPITAL | Age: 79
End: 2024-08-13
Payer: MEDICARE

## 2024-08-13 ENCOUNTER — ANESTHESIA EVENT (OUTPATIENT)
Dept: SURGERY | Facility: HOSPITAL | Age: 79
End: 2024-08-13
Payer: MEDICARE

## 2024-08-13 LAB
ANION GAP SERPL CALC-SCNC: 10 MMOL/L (ref 0–18)
BASOPHILS # BLD: 0.08 X10(3) UL (ref 0–0.2)
BASOPHILS NFR BLD: 3 %
BUN BLD-MCNC: 53 MG/DL (ref 9–23)
BUN/CREAT SERPL: 21 (ref 10–20)
CALCIUM BLD-MCNC: 9.2 MG/DL (ref 8.7–10.4)
CHLORIDE SERPL-SCNC: 100 MMOL/L (ref 98–112)
CO2 SERPL-SCNC: 23 MMOL/L (ref 21–32)
CREAT BLD-MCNC: 2.52 MG/DL
DEPRECATED RDW RBC AUTO: 61.9 FL (ref 35.1–46.3)
EGFRCR SERPLBLD CKD-EPI 2021: 19 ML/MIN/1.73M2 (ref 60–?)
EOSINOPHIL # BLD: 0.18 X10(3) UL (ref 0–0.7)
EOSINOPHIL NFR BLD: 7 %
ERYTHROCYTE [DISTWIDTH] IN BLOOD BY AUTOMATED COUNT: 18.2 % (ref 11–15)
GLUCOSE BLD-MCNC: 83 MG/DL (ref 70–99)
HCT VFR BLD AUTO: 26.4 %
HGB BLD-MCNC: 8.3 G/DL
LYMPHOCYTES NFR BLD: 0.8 X10(3) UL (ref 1–4)
LYMPHOCYTES NFR BLD: 32 %
MCH RBC QN AUTO: 31 PG (ref 26–34)
MCHC RBC AUTO-ENTMCNC: 31.4 G/DL (ref 31–37)
MCV RBC AUTO: 98.5 FL
MONOCYTES # BLD: 0.15 X10(3) UL (ref 0.1–1)
MONOCYTES NFR BLD: 6 %
NEUTROPHILS # BLD AUTO: 1.13 X10 (3) UL (ref 1.5–7.7)
NEUTROPHILS NFR BLD: 51 %
NEUTS BAND NFR BLD: 1 %
NEUTS HYPERSEG # BLD: 1.3 X10(3) UL (ref 1.5–7.7)
NRBC BLD MANUAL-RTO: 1 %
OSMOLALITY SERPL CALC.SUM OF ELEC: 290 MOSM/KG (ref 275–295)
PLATELET # BLD AUTO: 91 10(3)UL (ref 150–450)
PLATELET MORPHOLOGY: NORMAL
PLATELETS.RETICULATED NFR BLD AUTO: 8.5 % (ref 0–7)
POTASSIUM SERPL-SCNC: 4.7 MMOL/L (ref 3.5–5.1)
RBC # BLD AUTO: 2.68 X10(6)UL
SODIUM SERPL-SCNC: 133 MMOL/L (ref 136–145)
TOTAL CELLS COUNTED BLD: 100
WBC # BLD AUTO: 2.5 X10(3) UL (ref 4–11)

## 2024-08-13 PROCEDURE — 11042 DBRDMT SUBQ TIS 1ST 20SQCM/<: CPT | Performed by: SURGERY

## 2024-08-13 PROCEDURE — 99233 SBSQ HOSP IP/OBS HIGH 50: CPT | Performed by: INTERNAL MEDICINE

## 2024-08-13 PROCEDURE — 0HB8XZZ EXCISION OF BUTTOCK SKIN, EXTERNAL APPROACH: ICD-10-PCS | Performed by: SURGERY

## 2024-08-13 PROCEDURE — 10060 I&D ABSCESS SIMPLE/SINGLE: CPT | Performed by: SURGERY

## 2024-08-13 PROCEDURE — 0JB90ZZ EXCISION OF BUTTOCK SUBCUTANEOUS TISSUE AND FASCIA, OPEN APPROACH: ICD-10-PCS | Performed by: SURGERY

## 2024-08-13 PROCEDURE — 11045 DBRDMT SUBQ TISS EACH ADDL: CPT | Performed by: SURGERY

## 2024-08-13 RX ORDER — HYDROMORPHONE HYDROCHLORIDE 1 MG/ML
0.6 INJECTION, SOLUTION INTRAMUSCULAR; INTRAVENOUS; SUBCUTANEOUS EVERY 5 MIN PRN
Status: DISCONTINUED | OUTPATIENT
Start: 2024-08-13 | End: 2024-08-13 | Stop reason: HOSPADM

## 2024-08-13 RX ORDER — LIDOCAINE HYDROCHLORIDE 10 MG/ML
INJECTION, SOLUTION EPIDURAL; INFILTRATION; INTRACAUDAL; PERINEURAL AS NEEDED
Status: DISCONTINUED | OUTPATIENT
Start: 2024-08-13 | End: 2024-08-13 | Stop reason: SURG

## 2024-08-13 RX ORDER — ACETAMINOPHEN 10 MG/ML
INJECTION, SOLUTION INTRAVENOUS AS NEEDED
Status: DISCONTINUED | OUTPATIENT
Start: 2024-08-13 | End: 2024-08-13 | Stop reason: SURG

## 2024-08-13 RX ORDER — HYDROMORPHONE HYDROCHLORIDE 1 MG/ML
0.4 INJECTION, SOLUTION INTRAMUSCULAR; INTRAVENOUS; SUBCUTANEOUS EVERY 5 MIN PRN
Status: DISCONTINUED | OUTPATIENT
Start: 2024-08-13 | End: 2024-08-13 | Stop reason: HOSPADM

## 2024-08-13 RX ORDER — MORPHINE SULFATE 10 MG/ML
6 INJECTION, SOLUTION INTRAMUSCULAR; INTRAVENOUS EVERY 10 MIN PRN
Status: DISCONTINUED | OUTPATIENT
Start: 2024-08-13 | End: 2024-08-13 | Stop reason: HOSPADM

## 2024-08-13 RX ORDER — MAGNESIUM HYDROXIDE 1200 MG/15ML
LIQUID ORAL CONTINUOUS PRN
Status: DISCONTINUED | OUTPATIENT
Start: 2024-08-13 | End: 2024-08-13 | Stop reason: HOSPADM

## 2024-08-13 RX ORDER — HYDROMORPHONE HYDROCHLORIDE 1 MG/ML
0.2 INJECTION, SOLUTION INTRAMUSCULAR; INTRAVENOUS; SUBCUTANEOUS EVERY 5 MIN PRN
Status: DISCONTINUED | OUTPATIENT
Start: 2024-08-13 | End: 2024-08-13 | Stop reason: HOSPADM

## 2024-08-13 RX ORDER — MORPHINE SULFATE 2 MG/ML
1 INJECTION, SOLUTION INTRAMUSCULAR; INTRAVENOUS EVERY 4 HOURS PRN
Status: DISCONTINUED | OUTPATIENT
Start: 2024-08-13 | End: 2024-08-13

## 2024-08-13 RX ORDER — SODIUM CHLORIDE, SODIUM LACTATE, POTASSIUM CHLORIDE, CALCIUM CHLORIDE 600; 310; 30; 20 MG/100ML; MG/100ML; MG/100ML; MG/100ML
INJECTION, SOLUTION INTRAVENOUS CONTINUOUS
Status: DISCONTINUED | OUTPATIENT
Start: 2024-08-13 | End: 2024-08-13 | Stop reason: HOSPADM

## 2024-08-13 RX ORDER — NALOXONE HYDROCHLORIDE 0.4 MG/ML
0.08 INJECTION, SOLUTION INTRAMUSCULAR; INTRAVENOUS; SUBCUTANEOUS AS NEEDED
Status: DISCONTINUED | OUTPATIENT
Start: 2024-08-13 | End: 2024-08-13 | Stop reason: HOSPADM

## 2024-08-13 RX ORDER — MORPHINE SULFATE 4 MG/ML
2 INJECTION, SOLUTION INTRAMUSCULAR; INTRAVENOUS EVERY 10 MIN PRN
Status: DISCONTINUED | OUTPATIENT
Start: 2024-08-13 | End: 2024-08-13 | Stop reason: HOSPADM

## 2024-08-13 RX ORDER — MORPHINE SULFATE 4 MG/ML
4 INJECTION, SOLUTION INTRAMUSCULAR; INTRAVENOUS EVERY 10 MIN PRN
Status: DISCONTINUED | OUTPATIENT
Start: 2024-08-13 | End: 2024-08-13 | Stop reason: HOSPADM

## 2024-08-13 RX ORDER — MORPHINE SULFATE 2 MG/ML
1 INJECTION, SOLUTION INTRAMUSCULAR; INTRAVENOUS
Status: DISCONTINUED | OUTPATIENT
Start: 2024-08-13 | End: 2024-08-19

## 2024-08-13 RX ADMIN — ACETAMINOPHEN 650 MG: 10 INJECTION, SOLUTION INTRAVENOUS at 10:36:00

## 2024-08-13 RX ADMIN — SODIUM CHLORIDE: 9 INJECTION, SOLUTION INTRAVENOUS at 10:21:00

## 2024-08-13 RX ADMIN — LIDOCAINE HYDROCHLORIDE 25 MG: 10 INJECTION, SOLUTION EPIDURAL; INFILTRATION; INTRACAUDAL; PERINEURAL at 10:29:00

## 2024-08-13 NOTE — PLAN OF CARE
POD0. Dressing clean dry and intact. AOX4. Room air. Cardiac diet, tolerated well. SCDS for dvt prophylaxis. IV bolus given this afternoon, tolerated well. PRN norco and morphine given for pain management.  Repositioned frequently by staff. Blue boots and elevated lower extremities. Mepilex in place for prophylaxis: bilateral shoulders, bilateral elbows, bilateral hips, bilateral knees and sacrum. Fentanyl patch Left upper arm. Call light within reach.     Problem: Patient Centered Care  Goal: Patient preferences are identified and integrated in the patient's plan of care  Description: Interventions:  - What would you like us to know as we care for you?   - Provide timely, complete, and accurate information to patient/family  - Incorporate patient and family knowledge, values, beliefs, and cultural backgrounds into the planning and delivery of care  - Encourage patient/family to participate in care and decision-making at the level they choose  - Honor patient and family perspectives and choices  Outcome: Progressing     Problem: Patient/Family Goals  Goal: Patient/Family Long Term Goal  Description: Patient's Long Term Goal:     Interventions:  - See additional Care Plan goals for specific interventions  Outcome: Progressing  Goal: Patient/Family Short Term Goal  Description: Patient's Short Term Goal:     Interventions:   - See additional Care Plan goals for specific interventions  Outcome: Progressing     Problem: PAIN - ADULT  Goal: Verbalizes/displays adequate comfort level or patient's stated pain goal  Description: INTERVENTIONS:  - Encourage pt to monitor pain and request assistance  - Assess pain using appropriate pain scale  - Administer analgesics based on type and severity of pain and evaluate response  - Implement non-pharmacological measures as appropriate and evaluate response  - Consider cultural and social influences on pain and pain management  - Manage/alleviate anxiety  - Utilize distraction  and/or relaxation techniques  - Monitor for opioid side effects  - Notify MD/LIP if interventions unsuccessful or patient reports new pain  - Anticipate increased pain with activity and pre-medicate as appropriate  Outcome: Progressing     Problem: RISK FOR INFECTION - ADULT  Goal: Absence of fever/infection during anticipated neutropenic period  Description: INTERVENTIONS  - Monitor WBC  - Administer growth factors as ordered  - Implement neutropenic guidelines  Outcome: Progressing     Problem: SAFETY ADULT - FALL  Goal: Free from fall injury  Description: INTERVENTIONS:  - Assess pt frequently for physical needs  - Identify cognitive and physical deficits and behaviors that affect risk of falls.  - [place] fall precautions as indicated by assessment.  - Educate pt/family on patient safety including physical limitations  - Instruct pt to call for assistance with activity based on assessment  - Modify environment to reduce risk of injury  - Provide assistive devices as appropriate  - Consider OT/PT consult to assist with strengthening/mobility  - Encourage toileting schedule  Outcome: Progressing     Problem: SKIN/TISSUE INTEGRITY - ADULT  Goal: Skin integrity remains intact  Description: INTERVENTIONS  - Assess and document risk factors for pressure ulcer development  - Assess and document skin integrity  - Monitor for areas of redness and/or skin breakdown  - Initiate interventions, skin care algorithm/standards of care as needed  Outcome: Progressing  Goal: Incision(s), wounds(s) or drain site(s) healing without S/S of infection  Description: INTERVENTIONS:  - Assess and document risk factors for pressure ulcer development  - Assess and document skin integrity  - Assess and document dressing/incision, wound bed, drain sites and surrounding tissue  - Implement wound care per orders  - Initiate isolation precautions as appropriate  - Initiate Pressure Ulcer prevention bundle as indicated  Outcome:  Progressing  Goal: Oral mucous membranes remain intact  Description: INTERVENTIONS  - Assess oral mucosa and hygiene practices  - Implement preventative oral hygiene regimen  - Implement oral medicated treatments as ordered  Outcome: Progressing

## 2024-08-13 NOTE — ANESTHESIA POSTPROCEDURE EVALUATION
Patient: Margaret Silverio    Procedure Summary       Date: 08/13/24 Room / Location: [facility] MAIN OR  / [facility] MAIN OR    Anesthesia Start: 1021 Anesthesia Stop: 1049    Procedure: Sacral ulcer debridement (Buttocks) Diagnosis: (Sacral ulcer)    Surgeons: Eben Mckeon MD Anesthesiologist: Kristopher Alicia MD    Anesthesia Type: MAC ASA Status: 4 - Emergent            Anesthesia Type: MAC    Vitals Value Taken Time   /44 08/13/24 1104   Temp 98.2 °F (36.8 °C) 08/13/24 1054   Pulse 61 08/13/24 1111   Resp 11 08/13/24 1111   SpO2 88 % 08/13/24 1111   Vitals shown include unfiled device data.    [facility] AN Post Evaluation:   Patient Evaluated in PACU  Patient Participation: complete - patient participated  Level of Consciousness: awake and alert  Pain Score: 0  Pain Management: adequate  Airway Patency:patent  Yes    Nausea/Vomiting: none  Cardiovascular Status: acceptable  Respiratory Status: acceptable  Postoperative Hydration acceptable      Kristopher Alicia MD  8/13/2024 11:12 AM

## 2024-08-13 NOTE — HISTORICAL OFFICE NOTE
[facility]  [address], [address] [place] [address]  [phone]      Margaret Silverio  Progress Note  Demographics:  Name: Margaret Silverio YOB: 1945  Age: 79, Female Medical Record No: [id number]  Visited Date/Time: 04/01/2024 01:20 PM    Chief Complaints  follow up  SOB, heart palp   History of Present Illness  79-year-old female being followed for nonischemic cardiomyopathy, normal right and left heart cath October 2022.  History of possible TIA, atrial flutter, mitral valve replacement on Eliquis.  GIB admission requiring ICU admission April 2023.  Dual-chamber ICD placed September 2023.    Has been having worsening palpitations and shortness of breath, noted to have paroxysmal atrial fibrillation on her monitor therefore asked to come in today.  Denies weight gain, lower extremity edema, orthopnea or PND.  Denies syncope associated with palpitations.  Cardiac risk factors Never smoked  Past Medical History  1.On amiodarone therapy  2.Atrial fibrillation (Afib), paroxysmal - A Fib  3.Claudication  4.ACC/AHA stage B systolic heart failure  5.History of mitral valve replacement (MVR) - Hx  6.Benign essential HTN  Past Surgical History  1.Automatic implantable cardiac defibrillator (S/P AICD)  2.History of mitral valve replacement (MVR) - Hx  Family History  1. Father - No history of Family history of heart disease  2. Mother - No history of Family history of heart disease  Social History  Smoking status Never smoked  Tobacco usage - No (Non-smoker for personal reasons (finding))  Review of systems  Cardiovascular Edema  No history of Chest pain, RAM, Palpitations, Syncope, PND, Orthopnea and Claudication  Respiratory No history of SOB, Wheezing and Sputum  Hem/Lymphatic No history of Easy bruising, Blood clots, Hx of blood transfusion, Anemia and Bleeding problems  Physical Examination  Constitutional 98%O2  Vitals Left Arm Sitting  / 72 mmHg, Pulse  rate 61 bpm, Height in 5' 4\", BMI: 21.5, Weight in 125 lbs (or) 57 kgs and BSA : 1.6 cc/m²  General Appearance No Acute Distress and Well groomed  Cardiovascular   EKG/Other abnormalities  General: NAD  HEENT: Normocephalic, anicteric sclera, neck supple.  Neck: No JVD, carotids 2+, no bruits.  Cardiac: Regular rate and rhythm. S1, S2 normal. No murmur, pericardial rub, S3.  Lungs: Clear without wheezes, rales, rhonchi or dullness.  Normal excursions and effort.  Abdomen: Soft, non-tender. BS-present.  Extremities: Without clubbing, cyanosis or edema.  Peripheral pulses are 2+.  Neurologic: Non-focal  Skin: Warm and dry.  Allergies  1.lisinopril - Ingredient(Reaction:cough, Severity:Mild)  Medications (Info obtained by: Verbal)  1.alendronate 70 mg tablet, Take 1 tablet orally once a week  2.amiodarone 200 mg tablet, Take 1 tablet orally once a day.  3.amitriptyline 10 mg tablet, Take 1 tablet orally once a day as needed.  4.carvediloL 12.5 mg tablet, Take 1 tablet orally 2 times a day.  5.Entresto 49 mg-51 mg tablet, Take 1 tablet orally 2 times a day.  6.folic acid 800 mcg tablet, Take 1 tablet orally once a day.  7.metHOTREXate sodium 2.5 mg tablet, Take 6 tablets orally once a week  8.pantoprazole 40 mg tablet,delayed release, Take 1 tablet orally once a day.  9.bumetanide 1 mg tablet, Take 1 tablet orally once a day.  10.spironolactone 25 mg tablet, Take 1 tablet orally once a day.  Impression  1.On amiodarone therapy  2.Automatic implantable cardiac defibrillator (S/P AICD)  3.Atrial fibrillation (Afib), paroxysmal - A Fib  4.Claudication  5.ACC/AHA stage B systolic heart failure  6.History of mitral valve replacement (MVR) - Hx  7.Benign essential HTN  Cardiac history:  HFrEF: Nonischemic cardiomyopathy based on left and right heart cath October 2022  -Echocardiogram October 2022 EF 35%, bioprosthetic mitral valve gradient 6 mmHg, severe tricuspid regurgitation  -Right heart cath October 2022 normal filling  pressures, cardiac index however elevated systemic blood pressures    History of mitral valve replacement in [place] 29 mm Lazo valve  -Echo Oct 2022 MG 6 mmHg    GIB admission April 2023, 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  2 week admission, requiring ICU care.    Hypertension, hyperlipidemia  Assessment & Plan  HFrEF: Severely reduced ejection fraction, NYHA class III symptoms.  Nonischemic cardiomyopathy.  Ejection fraction persistently reduced despite medical optimization, DC ICD placed Sept 2023.  Now having shortness of breath and palpitations likely due to paroxysmal atrial fibrillation, burden has increased to 38% despite amiodarone.  Also volume overloaded.  DDx includes PE, amio toxicity as well therefore CT chest.  Lastly worsening TR and RV failure however this would not explain drop in LV EF.  -restart bumex 1 mg daily and spironolactone 25 mg daily  -BMP later this week  -CT pulmonary artery angiogram    Paroxsymal AF:  Worsening RAM and palpitations.  Likely due to paroxysmal A-fib, by exam seems to be in normal rhythm.  evaluate lungs as above given she's on amiodarone.    CVA/TIA: Admission November 2022 with possible TIA however MRI negative.  She did have atrial flutter at that time.  We will continue Eliquis for now however needs Watchman eval due to GIB.  Dr. Sargent appointment as scheduled June 16.    Claudication: Nonobstructive anatomy on ultrasound, continue medical management    MVR: Mean gradient 6 across bioprosthetic mitral valve, continue routine echocardiogram follow-ups.  Continue Eliquis.    Hypertension:  Controlled on current regimen, no changes    Follow-up:  Clinic: 4 weeks, Dr. Sargent appointment to 2 to 3 weeks  Testing/intervention: EP, BMP, CT, meds    recommend low sodium diet, daily exercise and maintain a normal BMI. Recommend monitor blood pressure at home.    Patient with diagnosis of Heart Failure and EF less than 40%, consider an ACE or ARB.  Labs and Diagnostics  ordered  1.BMP (Basic Metabolic Panel) (1 Week)  Future appointments  1.Referral Visit - Johnathonjoby Rodriguez ([email or web address]) : (Today)  2.Follow up visit - Twin Boles MD (4 Weeks)  3.Follow up visit - Luis Sargent MD (3 Weeks)  Miscellaneous  1.Reviewed lower extremity arterial ultrasound, trans thoracic echocardiogram with the patient.  2.Weight monitoring (regime/therapy)  Nurses documentation  EKG: None  Refills: None   Upcoming surgeries: None  Use of assistive device(s): None      Patient instructions  -restart bumex 1 mg daily and spironolactone 25 mg daily  -BMP later this week  -CT pulmonary artery angiogram  Follow-up:  Clinic: 4 weeks, Dr. Sargent appointment to 2 to 3 weeks  Testing/intervention: EP, BMP, CT, meds    recommend low sodium diet, daily exercise and maintain a normal BMI. Recommend monitor blood pressure at home.    Patient with diagnosis of Heart Failure and EF less than 40%, consider an ACE or ARB.  Lab Details  BASIC METABOLIC PANEL (8)  09/25/2023 02:58:50 PM  GLUCOSE 104 70-99 mg/dL H F  SODIUM 139 136-145 mmol/L  F  POTASSIUM 4.0 3.5-5.1 mmol/L  F  CHLORIDE 106  mmol/L  F  CO2 20.0 21.0-32.0 mmol/L L F  ANION GAP 13 0-18 mmol/L  F  BUN 18 7-18 mg/dL  F  CREATININE 1.47 0.55-1.02 mg/dL H F  BUN/ CREAT RATIO 12.2 10.0-20.0  F  CALCIUM 9.6 8.5-10.1 mg/dL  F  OSMOLALITY CALCULATED 290 275-295 mOsm/kg  F  E GFR CR 37 >=60 mL/min/1.73m2 L F  FASTING PATIENT BMP ANSWER Yes   F  MRSA SCREEN BY PCR  09/25/2023 02:53:49 PM  MRSA SCREEN BY PCR Negative Negative  F  RBC MORPHOLOGY SCAN  09/25/2023 02:28:46 PM  MORPHOLOGY See morphology below Normal, Slide reviewed, see previous RBC morphology. A F  PLATELET MORPHOLOGY Normal Normal  F  MACROCYTOSIS 1+   F  POLYCHROMASIA 1+   F  OVALOCYTES 1+   F  CBC W/ DIFFERENTIAL  09/25/2023 02:27:39 PM  WBC 3.8 4.0-11.0 x10(3) uL L F  RED BLOOD COUNT 3.29 3.80-5.30 x10(6)uL L F  HGB 10.1 12.0-16.0 g/dL L F  HCT 33.0 35.0-48.0 % L F  MEAN CELL  VOLUME 100.3 80.0-100.0 fL H F  MEAN CORPUSCULAR HEMOGLOBIN 30.7 26.0-34.0 pg  F  MEAN CORPUSCULAR HGB CONC 30.6 31.0-37.0 g/dL L F  RED CELL DISTRIBUTION WIDTH-SD 67.4 35.1-46.3 fL H F  RED CELL DISTRIBUTION WIDTH CV 19.2 11.0-15.0 % H F  PLATELETS 190.0 150.0-450.0 10(3)uL  F  NEUTROPHIL ABS PRELIM 2.31 1.50-7.70 x10 (3) uL  F  NEUTROPHIL ABSOLUTE 2.31 1.50-7.70 x10(3) uL  F  LYMPHOCYTE ABSOLUTE 0.94 1.00-4.00 x10(3) uL L F  MONOCYTE ABSOLUTE 0.31 0.10-1.00 x10(3) uL  F  EOSINOPHIL ABSOLUTE 0.14 0.00-0.70 x10(3) uL  F  BASOPHIL ABSOLUTE 0.05 0.00-0.20 x10(3) uL  F  IMMATURE GRANULOCYTE COUNT 0.02 0.00-1.00 x10(3) uL  F  NEUTROPHIL % 61.4 %  F  LYMPHOCYTE % 24.9 %  F  MONOCYTE % 8.2 %  F  EOSINOPHIL % 3.7 %  F  BASOPHIL % 1.3 %  F  IMMATURE GRANULOCYTE RATIO % 0.5 %  F  PROTHROMBIN TIME (PT)  09/25/2023 01:40:46 PM  PROTIME 18.9 11.6-14.8 seconds H F  INR 1.61 0.85-1.16 H F  Diagnostics Details  Trans Thoracic Echocardiogram 09/01/2023  1.The left ventricle is normal in size. The left ventricular wall thickness is normal. Global left ventricular systolic function is moderately decreased. The left ventricular ejection fraction is 37%. Regional wall motion analysis shows dyskinesis of mid anteroseptal segment and mid inferoseptal segment. Wall motion score index is 4. Left ventricular diastolic function is indeterminate.    2.Right ventricular systolic function is severely decreased.    3.The left atrial diameter is mildly increased.    4.The right atrium is mildly enlarged.    5.Severe (4+) tricuspid regurgitation.    6.A bio prosthetic valve is noted at the mitral location. The mean trans mitral gradient is 5.0 mmHg.    7.The pulmonary artery systolic pressure is 40 mmHg.    Lower Extremity Arterial Ultrasound 09/22/2022  1.The study quality is good.    2.Bilateral scattered plaque in the lower extremity arterial system causing less than 50% stenosis.    3.Bilateral lower extremity arterial system demonstrates  tri-phasic & bi-phasic waveforms.    4.Left anterior tibial artery demonstrates low velocity flow.    5.Three vessel runoff to the feet bilaterally.    CPOE Orders carried out by: Carleen MURILLO  Care Providers: Twin Boles MD, Carleen MURILLO, Travsi MURILLO and Paula Yepez  Electronically Authenticated by  Twin Boles MD  04/02/2024 09:57:31 AM  Disclaimer: Components of this note were documented using voice recognition system and are subject to errors not corrected at proofreading. Contact the author of this note for any clarifications.

## 2024-08-13 NOTE — PROGRESS NOTES
General surgery note    Sacral decubitus ulcer unstageable with necrosis.  Awaiting cardiology clearance

## 2024-08-13 NOTE — HISTORICAL OFFICE NOTE
[facility]  Outside Information  Continuity of Care Document  7/23/2024  Margaret Silverio - 79 y.o. Female; born [birth year]Summary of episode note, generated on Aug. 01, 2024August 01, 2024   CHIEF COMPLAINT    CHIEF COMPLAINT  Reason for Visit/Chief Complaint   F/U   Patient is here for hospital follow-up regarding congestive heart failure pleural effusion and severe neck arthritis pain admission with right heart cath and IV diuresis. 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 Boles for heart failure with reduced ejection fraction.Since last office visit was admitted May with weakness acute kidney injury and anemia and then later May again with respiratory failure then sent to the ER in June and then admitted July 15 to 22 with heart failure. Had a right heart cath with volume overload was diuresed and had pressors placed in addition to a left thoracentesis. No evidence of atrial fibrillation but does have a wide paced QRS. Palliative care consult was obtained due to severe neck pain and she was on MS Contin now and Duragesic patch and following up with palliative care. Also follows in specially care clinic with hand.Previously in 2023 noted: In the hospital last month with the severe GI bleeding anemia treated heart failure and EF 30% on echo January 2024. We did do a CTA at the time to visualize left atrial appendage and it appeared to be occluded probably surgically at the time mitral valve surgery. Still awaiting surgical records from [place]. Peers like does not need anticoagulation nor candidate or need for any watchman despite the atrial fibrillation given the left atrial appendage appears to be stable occluded likely.Comes in with family also reports edema taking Lasix daily was diuresed in the hospital with IV diuretics. Does have follow-up with  primary care regarding anemia and is no longer on anticoagulation.Previously noted: Her ejection fraction has continued at 33% despite goal-directed medical therapy greater than 90 days. She is class II heart failure and a narrow QRS. She does have paroxysmal atrial fibrillation a long ND. EKG 2 months ago with sinus rhythm current one looks probably sinus with very long ND with ectopy. She has shortness of breath it 2 flights of stairs and 2 blocks but no PND orthopnea noted.Patient has a history of possible TIA although she says it was a seizure back in November 2022 also had a GI bleed in April 2023 but is back on her Eliquis but with history of these issues high risk for anticoagulation would consider for watchman. Talked about some of these issues she reported that she is doing fine on the blood thinners at this point is rather vague with a history with a GI bleed that required her to be in the ICU April 2023 also vague about the possible TIA history and was not sure about that back in November 2022 with or without was just a seizure. Does have history of mitral valve replacement and EF has been fluctuating and last dropped about 35% and medications were adjusted.     PROBLEMS  Reconcile with Patient's ChartPROBLEMS  Problem Effective Dates Date resolved Problem Status   ACC/AHA stage B systolic heart failure, [SNOMED-CT: 315330221619050] 7/27/2022 - Active   Nonischemic cardiomyopathy, [SNOMED-CT: 16003213] 5/21/2024 - Active   Anemia, unspecified, [SNOMED-CT: 869037573] 5/21/2024 - Active   Anorexia, [SNOMED-CT: 72068994] 5/21/2024 - Active   Chronic kidney insufficiency, stage 3 (moderate), [SNOMED-CT: 469598482] 5/21/2024 - Active   H/O hypotension, [SNOMED-CT: 095006559] 5/21/2024 - Active   Severe tricuspid regurgitation, [SNOMED-CT: 080421579] 5/21/2024 - Active   SOB (shortness of breath), [SNOMED-CT: 928492816] 4/11/2024 - Active   On amiodarone therapy, [SNOMED-CT: 272478124] 1/29/2024 - Active    Automatic implantable cardiac defibrillator (S/P AICD), [SNOMED-CT: 868991133] 1/29/2024 - Active   Atrial fibrillation (Afib), paroxysmal - A Fib, [SNOMED-CT: 365684164] 1/29/2024 - Active   Claudication, [SNOMED-CT: 58042635] 7/27/2022 - Active   History of mitral valve replacement (MVR) - Hx, [SNOMED-CT: 0850015698722] 6/22/2022 - Active   Benign essential HTN, [SNOMED-CT: 0424178] 6/22/2022 - Active   Acute kidney injury (HCC), [SNOMED-CT: 34985516] 5/21/2024 - Active     ENCOUNTER DIAGNOSIS    ENCOUNTER DIAGNOSIS  Problem Effective Dates Date resolved Problem Status   Acute on chronic combined systolic and diastolic CHF, NYHA class 4, [SNOMED-CT: 598507561872497] 6/18/2024 - Active   Nonischemic cardiomyopathy, [SNOMED-CT: 84609500] 5/21/2024 - Active   Anemia, unspecified, [SNOMED-CT: 622194045] 5/21/2024 - Active   Chronic kidney insufficiency, stage 3 (moderate), [SNOMED-CT: 893074425] 5/21/2024 - Active   Severe tricuspid regurgitation, [SNOMED-CT: 006449737] 5/21/2024 - Active   SOB (shortness of breath), [SNOMED-CT: 280704085] 4/11/2024 - Active   On amiodarone therapy, [SNOMED-CT: 208005789] 1/29/2024 - Active   Atrial fibrillation (Afib), paroxysmal - A Fib, [SNOMED-CT: 227793901] 1/29/2024 - Active   History of mitral valve replacement (MVR) - Hx, [SNOMED-CT: 4396660781984] 6/22/2022 - Active   Benign essential HTN, [SNOMED-CT: 9830654] 6/22/2022 - Active     VITAL SIGNS    VITAL SIGNS  Date / Time: 7/24/2024   BP Systolic 84 mmHg   BP Diastolic 50 mmHg   Height 64 inches   Weight 118 lbs   Pulse Rate 60 bpm   BSA (Body Surface Area) 1.6 cc/m2   BMI (Body Mass Index) 20.3 cc/m2   Blood Pressure 84 / 50 mmHg     PHYSICAL EXAMINATION    PHYSICAL EXAMINATION  Header Details   Constitutional 98% O2   Vitals Left Arm Sitting BP 84 / 50 mmHg, Pulse rate 60 bpm, Regular, Height in 5' 4\", BMI: 20.3, Weight in 119.05 lbs (or) 54 kgs, BSA : 1.55 cc/m²   General Appearance No Acute Distress, Appropriate    Head/Eyes/Ears/Nose/Mouth/Throat Conjunctiva pink, Sclera Clear, Mucous membranes Moist   Neck Normal carotid pulsations   Respiratory Unlabored, Equal bilaterally   Cardiovascular Regular rhythm. Normal and normal S1 and S2   Gastrointestinal Abdomen soft, Non-tender, Normoactive bowel sounds   Musculoskeletal Normal spine   Gait Normal gait   Strength and tone Normal muscle strength   Upper Extremities No clubbing, No cyanosis   Lower Extremities Pulses 2+ and equal bilaterally, No edema   Skin Warm and dry, No rashes or lesions   Neurologic / Psychiatric Alert and Oriented   Speech Normal speech     ALLERGIES, ADVERSE REACTIONS, ALERTS    ALLERGIES, ADVERSE REACTIONS, ALERTS  Type Substance Reaction Severity Status   Allergies lisinopril - Ingredient, [RxNorm: 78686] cough Mild Active     MEDICATIONS ADMINISTERED DURING VISIT    No data available    MEDICATIONS    MEDICATIONS  Medication Start Date Route/Frequency Status   alendronate 70 MG Oral Tab, [RxNorm: 865016] 5/21/2024 Take 1 tablet (70 mg total) by mouth once a week. Active   amiodarone 200 MG Oral Tab, [RxNorm: 890953] 5/21/2024 Take 1 tablet (200 mg total) by mouth daily. Active   bumetanide 1 mg tablet, [RxNorm: 791118] 7/24/2024 Take 1 tablet orally 2 times a day. Active   carvediloL 6.25 mg tablet, [RxNorm: 242797] 7/24/2024 Take 1 tablet orally once a day. Active   Entresto 49 mg-51 mg tablet, [RxNorm: 1564080] 5/21/2024 Take 1 tablet orally 2 times a day. Active   Farxiga 10 mg tablet, [RxNorm: 1629964] 7/2/2024 Take 1 tablet orally once a day. Active   ferrous sulfate 325 (65 FE) MG Oral Tab EC, [RxNorm: 916762] 5/21/2024 Take 1 tablet (325 mg total) by mouth daily with breakfast. Active   folic acid 800 MCG Oral Tab, [RxNorm: 196287] 5/21/2024 Take 1 tablet (800 mcg total) by mouth daily. Active   gabapentin 300 mg capsule, [RxNorm: 746541] 7/24/2024 Take 1 capsule orally 3 times a day. Active   HYDROcodone-acetaminophen (NORCO)  MG Oral  Tab, [RxNorm: 117252] 5/21/2024 Take 1 tablet by mouth every 6 (six) hours as needed for Pain. Active   isosorbide dinitrate 20 mg tablet, [RxNorm: 133206] 6/5/2024 Take 1 tablet orally 3 times a day. Active   lidocaine 4 %-menthoL 1 % topical patch, [RxNorm: 1690429] 6/7/2024 (topical) once a day. Active   methotrexate 2.5 MG Oral Tab, [RxNorm: 406650] 5/21/2024 TAKE 6 TABLETS BY MOUTH 1 TIME A WEEK Active   naloxone 0.4 mg/mL injection syringe, [RxNorm: 1515773] 7/24/2024 (injection) once a day as needed. Active   PANTOPRAZOLE 40 MG Oral Tab EC, [RxNorm: 672273] 5/21/2024 TAKE 1 TABLET(40 MG) BY MOUTH TWICE DAILY BEFORE MEALS Active   senna 8.6 mg tablet, [RxNorm: 368009] 7/24/2024 Take 1 tablet orally once a day. Active   spironolactone 25 mg tablet, [RxNorm: 691606] 5/21/2024 HOLD Take 1 tablet orally once a day. Active   Tylenol Extra Strength 500 mg tablet, [RxNorm: 213454] 7/24/2024 Take 1 tablet orally every 4-6 hours as needed. Active     ASSESSMENT    Heart failure with reduced ejection fraction  Echo EF 38% January 2024, 33% on echo June 20, 2024  Status post right heart cath July 17, 2020 for the volume overload during heart failure hospitalization on IV drips  Follows with and in the [facility] care clinic on August 1 and needs Dr. Boles follow-up  May be candidate at left ventricular lead to current device however current comorbidities and chronic pain management take precedence of heart failure to be controlled medically  EF 33% nonischemic with no coronary disease at cath despite goal-directed medical therapy  Follows with Dr. Shane March 11Atrial fibrillation, paroxysmal  No recurrence on device check  Amiodarone started December 2023  Does have elevated TSH but normal T4 recheck labs in 3 months and decrease amiodarone dose  He had paroxysmal atrial fibrillation that was noted during hospitalization with possible TIA in the fall 2022  15% burden of A-fib on device  No longer on anticoagulation due to  severe life-threatening GI bleed and now left atrial appendage is shown to be likely occluded surgically and does not need long-term anticoagulation likely  [id number] score equal to 5Left pleural effusion  Had large left pleural effusion s/p thoracentesis with Dr. Miller in the hospital July 2024Acute kidney injury on chronic kidney injury baseline creatinine around 1.5  Hypotension  Is on midodrineMultilevel spondylosis lysis see 3-5  Seen by neurosurgery in the hospital not surgical candidate  Off MS Contin now on Duragesic patch following up with palliative carePlan  Follow-up with Dr. Boles in the next month  Follow-up with myself as scheduled     FAMILY HISTORY    FAMILY HISTORY  Relationship Age Diagnosis   Father 0 No history of Family history of heart disease   Mother 0 No history of Family history of heart disease     GENERAL STATUS    No data available    PAST MEDICAL HISTORY    PAST MEDICAL HISTORY  Problem Date diagonsed Date resolved Status   ACC/AHA stage B systolic heart failure, [SNOMED-CT: 897389952648018] 7/27/2022 - Active   Nonischemic cardiomyopathy, [SNOMED-CT: 34973486] 5/21/2024 - Active   Anemia, unspecified, [SNOMED-CT: 763919509] 5/21/2024 - Active   Anorexia, [SNOMED-CT: 55973080] 5/21/2024 - Active   Chronic kidney insufficiency, stage 3 (moderate), [SNOMED-CT: 312344635] 5/21/2024 - Active   H/O hypotension, [SNOMED-CT: 445690990] 5/21/2024 - Active   Severe tricuspid regurgitation, [SNOMED-CT: 391359018] 5/21/2024 - Active   SOB (shortness of breath), [SNOMED-CT: 063579031] 4/11/2024 - Active   On amiodarone therapy, [SNOMED-CT: 792413524] 1/29/2024 - Active   Atrial fibrillation (Afib), paroxysmal - A Fib, [SNOMED-CT: 487898240] 1/29/2024 - Active   Claudication, [SNOMED-CT: 80680887] 7/27/2022 - Active   History of mitral valve replacement (MVR) - Hx, [SNOMED-CT: 2362791971662] 6/22/2022 - Active   Benign essential HTN, [SNOMED-CT: 8038156] 6/22/2022 - Active   Acute kidney injury  ([facility]), [SNOMED-CT: 61876765] 5/21/2024 - Active     HISTORY OF PRESENT ILLNESS    Patient is here for hospital follow-up regarding congestive heart failure pleural effusion and severe neck arthritis pain admission with right heart cath and IV diuresis. 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 Boles for heart failure with reduced ejection fraction.Since last office visit was admitted May with weakness acute kidney injury and anemia and then later May again with respiratory failure then sent to the ER in June and then admitted July 15 to 22 with heart failure. Had a right heart cath with volume overload was diuresed and had pressors placed in addition to a left thoracentesis. No evidence of atrial fibrillation but does have a wide paced QRS. Palliative care consult was obtained due to severe neck pain and she was on MS Contin now and Duragesic patch and following up with palliative care. Also follows in [facility] care clinic with hand.Previously in 2023 noted: In the hospital last month with the severe GI bleeding anemia treated heart failure and EF 30% on echo January 2024. We did do a CTA at the time to visualize left atrial appendage and it appeared to be occluded probably surgically at the time mitral valve surgery. Still awaiting surgical records from [place]. Peers like does not need anticoagulation nor candidate or need for any watchman despite the atrial fibrillation given the left atrial appendage appears to be stable occluded likely.Comes in with family also reports edema taking Lasix daily was diuresed in the hospital with IV diuretics. Does have follow-up with primary care regarding anemia and is no longer on anticoagulation.Previously noted: Her ejection fraction has continued at 33% despite goal-directed medical therapy greater than 90 days. She is class II heart failure and a  narrow QRS. She does have paroxysmal atrial fibrillation a long RI. EKG 2 months ago with sinus rhythm current one looks probably sinus with very long RI with ectopy. She has shortness of breath it 2 flights of stairs and 2 blocks but no PND orthopnea noted.Patient has a history of possible TIA although she says it was a seizure back in November 2022 also had a GI bleed in April 2023 but is back on her Eliquis but with history of these issues high risk for anticoagulation would consider for watchman. Talked about some of these issues she reported that she is doing fine on the blood thinners at this point is rather vague with a history with a GI bleed that required her to be in the ICU April 2023 also vague about the possible TIA history and was not sure about that back in November 2022 with or without was just a seizure. Does have history of mitral valve replacement and EF has been fluctuating and last dropped about 35% and medications were adjusted.     IMMUNIZATIONS  Reconcile with Patient's ChartNo data available    PLAN OF CARE    PLAN OF CARE  Planned Care Date   EKG (electrocardiogram) 1/1/1900   Referral Visit - Johnathon Rodriguez ([email or web address]) : 1/1/1900   Follow up visit - Twin Boles MD 8/26/2024     PROCEDURES    No data available    RESULTS    RESULTS  Name Result Date Location - Ordered By   POCT CREATININE [LOINC: 16370-0] 1.90 mg/dL [High] 04/11/2024 10:39:00 AM [facility] LAB ([facility])  Address: [address]  [facility]  [address]  tel:   E GFR CR [LOINC: 67977-2] 27 mL/min/1.73m2 [Low] 04/11/2024 10:39:00 AM [facility] ([facility])  Address: [address]  [facility]  [address]  tel:   GLUCOSE [LOINC: 2339-0] 104 mg/dL [High] 09/25/2023 12:35:00 PM [facility] ([facility])  Address: [address]  [facility]  [address]  tel:   SODIUM [LOINC: 2951-2] 139 mmol/L 09/25/2023 12:35:00 PM [facility]  LAB ([facility])  Address: [address]  [facility]  [address]  tel:   POTASSIUM [LOINC: 2823-3] 4.0 mmol/L 09/25/2023 12:35:00 PM [facility] LAB ([facility])  Address: [address]  [facility]  [address]  tel:   CHLORIDE [LOINC: 2075-0] 106 mmol/L 09/25/2023 12:35:00 PM [facility] LAB ([facility])  Address: [address]  [facility]  [address]  tel:   CO2 [LOINC: 2028-9] 20.0 mmol/L [Low] 09/25/2023 12:35:00 PM [facility] LAB ([facility])  Address: [address]  [facility]  [address]  tel:   ANION GAP [LOINC: 33037-3] 13 mmol/L 09/25/2023 12:35:00 PM [facility] LAB ([facility])  Address: [address]  [facility]  [address]  tel:   BUN [LOINC: 6299-2] 18 mg/dL 09/25/2023 12:35:00 PM [facility] LAB ([facility])  Address: [address]  [facility]  [address]  tel:   CREATININE [LOINC: 40657-2] 1.47 mg/dL [High] 09/25/2023 12:35:00 PM [facility] LAB ([facility])  Address: [address]  [facility]  [address]  tel:   BUN/ CREAT RATIO [LOINC: 3097-3] 12.2 09/25/2023 12:35:00 PM [facility] LAB ([facility])  Address: [address]  [facility]  [address]  tel:   CALCIUM [LOINC: 69634-1] 9.6 mg/dL 09/25/2023 12:35:00 PM [facility] LAB ([facility])  Address: [address]  [facility]  [address]  tel:   OSMOLALITY CALCULATED [LOINC: 44145-5] 290 mOsm/kg 09/25/2023 12:35:00 PM [facility] ([facility])  Address: [address]  [facility]  [address]  tel:   E GFR CR [LOINC: 74126-9] 37 mL/min/1.73m2 [Low] 09/25/2023 12:35:00 PM [facility] ([facility])  Address: [address]  [facility]  [address]  tel:   FASTING PATIENT BMP ANSWER [LOINC: 71467-6] Yes 09/25/2023 12:35:00 PM [facility] LAB ([facility])  Address: [address]  [place]  [facility]  [address]  tel:   MRSA SCREEN BY PCR [LOINC: 08136-2] Negative 09/25/2023 12:35:00 PM [facility] LAB ([facility])  Address: [address]  [facility]  [address]  tel:   MORPHOLOGY [LOINC: 5359056] See morphology below [Abnormal] 09/25/2023 12:35:00 PM [facility] LAB ([facility])  Address: [address]  [facility]  [address]  tel:   PLATELET MORPHOLOGY [LOINC: 6721816] Normal 09/25/2023 12:35:00 PM [facility] LAB ([facility])  Address: [address]  [facility]  [address]  tel:   MACROCYTOSIS [LOINC: 738-5] 1+ 09/25/2023 12:35:00 PM [facility] ([facility])  Address: [address]  [facility]  [address]  tel:   POLYCHROMASIA [LOINC: 86544-3] 1+ 09/25/2023 12:35:00 PM [facility] ([facility])  Address: [address]  [facility]  [address]  tel:   OVALOCYTES [LOINC: 774-0] 1+ 09/25/2023 12:35:00 PM [facility] ([facility])  Address: [address]  [facility]  [address]  tel:   WBC [LOINC: 6690-2] 3.8 x10(3) uL [Low] 09/25/2023 12:35:00 PM [facility] ([facility])  Address: [address]  [facility]  [address]  tel:   RED BLOOD COUNT [LOINC: 789-8] 3.29 x10(6)uL [Low] 09/25/2023 12:35:00 PM [facility] ([facility])  Address: [address]  [facility]  [address]  tel:   HGB [LOINC: 718-7] 10.1 g/dL [Low] 09/25/2023 12:35:00 PM [facility] ([facility])  Address: [address]  [facility]  [address]  tel:   HCT [LOINC: 4544-3] 33.0 % [Low] 09/25/2023 12:35:00 PM [facility] ([facility])  Address: [address]  [facility]  [address]  tel:   MEAN CELL VOLUME [LOINC: 787-2] 100.3 fL [High] 09/25/2023 12:35:00 PM [facility] ([facility])  Address: [address]  [facility]  [address]  tel:   MEAN CORPUSCULAR HEMOGLOBIN [LOINC:  785-6] 30.7 pg 09/25/2023 12:35:00 PM [facility] LAB ([facility])  Address: [address]  [facility]  [address]  tel:   MEAN CORPUSCULAR HGB CONC [LOINC: 786-4] 30.6 g/dL [Low] 09/25/2023 12:35:00 PM [facility] ([facility])  Address: [address]  [facility]  [address]  tel:   RED CELL DISTRIBUTION WIDTH-SD [LOINC: 26041-3] 67.4 fL [High] 09/25/2023 12:35:00 PM [facility] LAB ([facility])  Address: [address]  [facility]  [address]  tel:   RED CELL DISTRIBUTION WIDTH CV [LOINC: 788-0] 19.2 % [High] 09/25/2023 12:35:00 PM [facility] ([facility])  Address: [address]  [facility]  [address]  tel:   PLATELETS [LOINC: 777-3] 190.0 10(3)uL 09/25/2023 12:35:00 PM [facility] LAB ([facility])  Address: [address]  [facility]  [address]  tel:   NEUTROPHIL ABS PRELIM [LOINC: 751-8] 2.31 x10 (3) uL 09/25/2023 12:35:00 PM [facility] LAB ([facility])  Address: [address]  [facility]  [address]  tel:   NEUTROPHIL ABSOLUTE [LOINC: 751-8] 2.31 x10(3) uL 09/25/2023 12:35:00 PM [facility] LAB ([facility])  Address: [address]  [facility]  [address]  tel:   LYMPHOCYTE ABSOLUTE [LOINC: 731-0] 0.94 x10(3) uL [Low] 09/25/2023 12:35:00 PM [facility] LAB ([facility])  Address: [address]  [facility]  [address]  tel:   MONOCYTE ABSOLUTE [LOINC: 742-7] 0.31 x10(3)  09/25/2023 12:35:00 PM [facility] LAB ([facility])  Address: [address]  [facility]  [address]  tel:   EOSINOPHIL ABSOLUTE [LOINC: 711-2] 0.14 x10(3)  09/25/2023 12:35:00 PM [facility] LAB ([facility])  Address: [address]  [facility]  [address]  tel:   BASOPHIL ABSOLUTE [LOINC: 704-7] 0.05 x10(3)  09/25/2023 12:35:00 PM [facility] LAB ([facility])  Address: [address]  [place]  [facility]  [address]  tel:   IMMATURE GRANULOCYTE COUNT [LOINC: 21619-0] 0.02 x10(3) uL 09/25/2023 12:35:00 PM [facility] LAB ([facility])  Address: [address]  [facility]  [address]  tel:   NEUTROPHIL % [LOINC: 770-8] 61.4 % 09/25/2023 12:35:00 PM [facility] LAB ([facility])  Address: [address]  [facility]  [address]  tel:   LYMPHOCYTE % [LOINC: 736-9] 24.9 % 09/25/2023 12:35:00 PM [facility] LAB ([facility])  Address: [address]  [facility]  [address]  tel:   MONOCYTE % [LOINC: 5905-5] 8.2 % 09/25/2023 12:35:00 PM [facility] LAB ([facility])  Address: [address]  [facility]  [address]  tel:   EOSINOPHIL % [LOINC: 713-8] 3.7 % 09/25/2023 12:35:00 PM [facility] LAB ([facility])  Address: [address]  [facility]  [address]  tel:   BASOPHIL % [LOINC: 706-2] 1.3 % 09/25/2023 12:35:00 PM [facility] LAB ([facility])  Address: [address]  [facility]  [address]  tel:   IMMATURE GRANULOCYTE RATIO % [LOINC: 75138-4] 0.5 % 09/25/2023 12:35:00 PM [facility] ([facility])  Address: [address]  [facility]  [address]  tel:   PROTIME [LOINC: 5902-2] 18.9 seconds [High] 09/25/2023 12:35:00 PM [facility] ([facility])  Address: [address]  [facility]  [address]  tel:   INR [LOINC: 73666-6] 1.61 [High] 09/25/2023 12:35:00 PM [facility] ([facility])  Address: [address] [facility]  [address]  tel:   Lower Extremity Arterial Ultrasound 1.The study quality is good. 2.Bilateral scattered plaque in the lower extremity arterial system causing less than 50% stenosis.3.Bilateral lower extremity arterial system demonstrates tri-phasic & bi-phasic waveforms.4.Left anterior tibial artery demonstrates low velocity flow.5.Three vessel runoff to the feet bilaterally. 9/22/2022 12:30:00 PM Blake  Italo VALDERRAMA   Trans Thoracic Echocardiogram 1.The left ventricle is normal in size. The left ventricular wall thickness is normal. Global left ventricular systolic function is moderately decreased. The left ventricular ejection fraction is 37%. Regional wall motion analysis shows dyskinesis of mid anteroseptal segment and mid inferoseptal segment. Wall motion score index is 4. Left ventricular diastolic function is indeterminate.2.Right ventricular systolic function is severely decreased. 3.The left atrial diameter is mildly increased. 4.The right atrium is mildly enlarged. 5.Severe (4+) tricuspid regurgitation. 6.A bio prosthetic valve is noted at the mitral location. The mean trans mitral gradient is 5.0 mmHg.7.The pulmonary artery systolic pressure is 40 mmHg. 9/1/2023 11:00:00 AM Luis Sargent MD     REVIEW OF SYSTEMS    REVIEW OF SYSTEMS  Header Details   Eyes No history of Blurry vision, Visual changes, Double vision   Cardiovascular No history of Chest pain, RAM, Palpitations, Syncope, PND, Orthopnea, Edema, Claudication   Respiratory No history of SOB, Wheezing, Sputum   Hem/Lymphatic No history of Easy bruising, Blood clots, Hx of blood transfusion, Anemia, Bleeding problems     SOCIAL HISTORY    SOCIAL HISTORY  Social History Element Description Effective Dates   Smoking status Former smoker -     FUNCTIONAL STATUS    No data available    MEDICAL EQUIPMENT    No data available    Goals Sections    No data available    REASON FOR REFERRAL             Health Concerns Section    No data available    COGNITIVE/MENTAL STATUS    No data available    Patient Demographics    Patient Demographics  Patient Address Patient Name Communication   [address] [place] [address] Margaret Silverio [phone] (Mobile)     Patient Demographics  Language Race / Ethnicity Marital Status   English - Spoken (Preferred) Black or  / Not  or       Document Information    Primary Care  Provider Other Service Providers Document Coverage Dates   Luis Sargent  NPI: [id number]  [phone] (Work)  [address], [address]  [place] [address]  [place] [address]  Interpreting Physicians  [facility]  [phone] (Work)  [address] [place] [address] Mana Cardoso  NPI: [id number]  [phone] (Work)  [address], [address] [place] [address]  [place] [address]  Nurses     Africa Fine  NPI: [id number]  [phone] (Work)  [address], [address] [place] [address]  [place] [address]  Nurses Jul. 24, 2024July 24, 2024      Organization   [facility]  [phone] (Work)  [address], [address] [place] [address]  [place] [address]     Encounter Providers Encounter Date    Jul. 24, 2024July 24, 2024     Legal Authenticator    Luis Sargent  NPI: [id number]  [phone] (Work)  [address], [address] [place] [address]  [place] [address]

## 2024-08-13 NOTE — PAYOR COMM NOTE
--------------  ADMISSION REVIEW     Payor: [employer]  Subscriber #:  [id number]  Authorization Number: [id number]    Admit date: 8/12/24  Admit time:  2:41 PM       REVIEW DOCUMENTATION:     ED Provider Notes        ED Provider Notes signed by Robert Sherman MD at 8/12/2024  1:24 PM       Author: Robert Sherman MD Service: -- Author Type: Physician    Filed: 8/12/2024  1:24 PM Date of Service: 8/12/2024  9:39 AM Status: Signed    : Robert Sherman MD (Physician)           Patient Seen in: [facility] Emergency Department      History     Chief Complaint   Patient presents with    Wound Care     Stated Complaint: Bed sore, diarrhea x2wks    Subjective:   HPI    79-year-old female with history of hypertension, congestive heart failure, chronic kidney disease, anemia, rheumatoid arthritis, and recent admission from 7/15-22 for acute on chronic congestive heart failure presents with complaints of large sacral wound and loose stool.  The patient reports that she began having a wound to her sacrum and buttocks area during her recent hospital admission.  She reports since her discharge to the wound has gotten larger with increased pain.  She is seen twice a week by a home health nurse who advised that she get evaluated for the large sacral wound.  The patient is largely bedbound but will get up and move to a chair for part of the day on most days.  She also reports that she has had mushy, loose stool and has been eating and drinking little because she is afraid of having bowel movements as it makes the wound more painful.  She denies any known fevers.  She had an initial appointment with the wound clinic today but came to the ED instead because she was feeling worse.    Objective:   Past Medical History:    Acute kidney insufficiency    Anemia    Arrhythmia    Back problem    CHF (congestive heart failure) ([facility])    CKD (chronic kidney disease) stage 3, GFR 30-59 ml/min ([facility])    Deep vein  thrombosis (HCC)    on B.T for only a month, possibly Magen    Essential hypertension    High blood pressure    History of blood transfusion    Rheumatoid arthritis (HCC)    Seizure disorder (HCC)    Pt denied at screening call 6/28/24              Past Surgical History:   Procedure Laterality Date    Cabg      Cardiac pacemaker placement      Colonoscopy N/A 01/17/2024    Dr. Rios    Colonoscopy N/A 01/17/2024    Procedure: COLONOSCOPY;  Surgeon: Zoraida Rios MD;  Location: [facility] ENDOSCOPY    Egd N/A 01/17/2024    Dr. Rios    Fracture surgery      Other surgical history  2017    Heart Surgery     Other surgical history  2020    Bilateral shoulder replacement                 Social History     Socioeconomic History    Marital status:    Tobacco Use    Smoking status: Former     Current packs/day: 0.00     Types: Cigarettes     Quit date: 2014     Years since quitting: 10.6    Smokeless tobacco: Never   Vaping Use    Vaping status: Never Used   Substance and Sexual Activity    Alcohol use: Never    Drug use: Never     Social Determinants of Health     Financial Resource Strain: Low Risk  (6/4/2024)    Financial Resource Strain     Difficulty of Paying Living Expenses: Not very hard     Med Affordability: No   Food Insecurity: No Food Insecurity (7/15/2024)    Food Insecurity     Food Insecurity: Never true   Transportation Needs: No Transportation Needs (7/15/2024)    Transportation Needs     Lack of Transportation: No   Housing Stability: Low Risk  (7/15/2024)    Housing Stability     Housing Instability: No              Review of Systems    Positive for stated Chief Complaint: Wound Care    Other systems are as noted in HPI.  Constitutional and vital signs reviewed.      All other systems reviewed and negative except as noted above.    Physical Exam     ED Triage Vitals [08/12/24 0859]   /59   Pulse 63   Resp 18   Temp 98 °F (36.7 °C)   Temp src    SpO2 100 %   O2 Device None (Room air)       Current  Vitals:   Vital Signs  BP: 110/59  Pulse: 63  Resp: 18  Temp: 98 °F (36.7 °C)    Oxygen Therapy  SpO2: 100 %  O2 Device: None (Room air)            Physical Exam      General Appearance: alert, no distress  Eyes: pupils equal and round no pallor or injection  ENT, Mouth: mucous membranes moist  Respiratory: there are no retractions, lungs are clear to auscultation  Cardiovascular: regular rate and rhythm  Gastrointestinal:  abdomen is soft and non tender, no masses, bowel sounds normal  Rectal: Small amount of heme positive dark stool in the rectal vault.  Neurological: Speech normal.  Moving extremities x 4.  Skin: Large, stage III sacral wound extending to the medial aspects of bilateral buttocks.  The wound is foul-smelling but no active drainage is noted.  Musculoskeletal: neck is supple non tender        Extremities are symmetrical, full range of motion  Psychiatric: patient is oriented X 3, there is no agitation    ED Course     Labs Reviewed   CBC WITH DIFFERENTIAL WITH PLATELET - Abnormal; Notable for the following components:       Result Value    WBC 1.3 (*)     RBC 2.37 (*)     HGB 7.4 (*)     HCT 23.6 (*)     RDW-SD 61.5 (*)     RDW 17.9 (*)     .0 (*)     Immature Platelet Fraction 7.3 (*)     Neutrophil Absolute Prelim 0.63 (*)     Neutrophil Absolute 0.63 (*)     Lymphocyte Absolute 0.32 (*)     All other components within normal limits   BASIC METABOLIC PANEL (8) - Abnormal; Notable for the following components:    Sodium 133 (*)     BUN 59 (*)     Creatinine 2.69 (*)     BUN/CREA Ratio 21.9 (*)     eGFR-Cr 18 (*)     All other components within normal limits   URINALYSIS WITH CULTURE REFLEX - Abnormal; Notable for the following components:    Glucose Urine 70 (*)     Ketones Urine Trace (*)     Protein Urine 30 (*)     Leukocyte Esterase Urine 500 (*)     WBC Urine 21-50 (*)     Bacteria Urine 1+ (*)     All other components within normal limits   SCAN SLIDE   MD BLOOD SMEAR CONSULT   TYPE  AND SCREEN    Narrative:     The following orders were created for panel order Type and screen.  Procedure                               Abnormality         Status                     ---------                               -----------         ------                     ABORH (Blood Type)[[id number]]                               Final result               Antibody Screen[[id number]]                                  Final result                 Please view results for these tests on the individual orders.   ABORH (BLOOD TYPE)   ANTIBODY SCREEN   RAINBOW DRAW BLUE   RAINBOW DRAW GOLD   URINE CULTURE, ROUTINE                   MDM      Lab and CT results noted.  Patient with large sacral wound without clear evidence of infection.  Also has what appears to be a urinary tract infection on urinalysis and CT scan.  Will begin empiric antibiotics and admit.  Patient also with worsening anemia.  Discussed with Dr. Sahni, hospitalist.  Also communicated with Dr. Mckeon, general surgery.  Admission disposition: 8/12/2024  1:23 PM                                        Medical Decision Making      Disposition and Plan     Clinical Impression:  1. Pressure injury of skin of sacral region, unspecified injury stage    2. Urinary tract infection without hematuria, site unspecified    3. Anemia, unspecified type         Disposition:  Admit  8/12/2024  1:23 pm    Follow-up:  No follow-up provider specified.  We recommend that you schedule follow up care with a primary care provider within the next three months to obtain basic health screening including reassessment of your blood pressure.      Medications Prescribed:  Current Discharge Medication List                            Hospital Problems       Present on Admission  Date Reviewed: 8/1/2024            ICD-10-CM Noted POA    * (Principal) Pressure injury of skin of sacral region, unspecified injury stage L89.159 8/12/2024 Unknown                     Signed by Robert Sherman  MD on 8/12/2024  1:24 PM         MEDICATIONS ADMINISTERED IN LAST 1 DAY:  acetaminophen (Ofirmev) 10 mg/mL infusion premix       Date Action Dose Route User    8/13/2024 1036 Given 650 mg Intravenous Kristopher Alicia MD          acetaminophen (Tylenol Extra Strength) tab 500 mg       Date Action Dose Route User    8/12/2024 1853 Given 500 mg Oral Otis Vegas RN          amiodarone (Pacerone) tab 200 mg       Date Action Dose Route User    8/13/2024 0917 Given 200 mg Oral Keyana Zhang RN          carvedilol (Coreg) tab 3.125 mg       Date Action Dose Route User    8/13/2024 0917 Given 3.125 mg Oral Keyana Zhang RN    8/12/2024 2004 Given 3.125 mg Oral Janna Fernández RN          cyclobenzaprine (Flexeril) tab 10 mg       Date Action Dose Route User    8/12/2024 2005 Given 10 mg Oral Janna Fernández RN          ferrous sulfate DR tab 325 mg       Date Action Dose Route User    8/13/2024 0917 Given 325 mg Oral Keyana Zhang RN          gabapentin (Neurontin) cap 300 mg       Date Action Dose Route User    8/13/2024 0917 Given 300 mg Oral Keyana Zhang RN    8/12/2024 2004 Given 300 mg Oral Janna Fernández RN          HYDROcodone-acetaminophen (Norco)  MG per tab 1 tablet       Date Action Dose Route User    8/12/2024 2005 Given 1 tablet Oral Janna Fernández RN          HYDROmorphone (Dilaudid) 1 MG/ML injection 0.4 mg       Date Action Dose Route User    8/13/2024 1104 Given 0.4 mg Intravenous Rey Blankenship RN    8/13/2024 1054 Given 0.4 mg Intravenous Rey Blankenship RN          isosorbide dinitrate (Isordil Titradose) tab 20 mg       Date Action Dose Route User    8/12/2024 2004 Given 20 mg Oral Janna Fernández RN          lidocaine PF (Xylocaine-MPF) 1% injection       Date Action Dose Route User    8/13/2024 1029 Given 25 mg Intravenous Kristopher Alicia MD          morphINE PF 2 MG/ML injection 1 mg       Date Action Dose Route User    8/13/2024 0531 Given 1  mg Intravenous Janna Fernández RN          propofol (Diprivan) 10 mg/mL infusion premix       Date Action Dose Route User    8/13/2024 1029 New Bag 50 mcg/kg/min × 54.4 kg Intravenous Kristopher Alicia MD          sacubitril-valsartan (Entresto) 49-51 MG per tab 1 tablet       Date Action Dose Route User    8/12/2024 2015 Given 1 tablet Oral Janna Fernández RN          sodium chloride 0.9 % irrigation       Date Action Dose Route User    8/13/2024 1039 New Bag 500 mL Irrigation Eben Mckeon MD          sodium chloride 0.9% infusion       Date Action Dose Route User    8/13/2024 1021 New Bag (none) Intravenous Kristopher Alicia MD          sodium chloride 0.9 % IV bolus 500 mL       Date Action Dose Route User    8/13/2024 1326 New Bag 500 mL Intravenous Keyana Zhang RN            Vitals (last day)       Date/Time Temp Pulse Resp BP SpO2 Weight O2 Device O2 Flow Rate (L/min) [facility]    08/13/24 1310 -- 59 -- 71/49 99 % -- Nasal cannula 1 L/min     08/13/24 1230 98.9 °F (37.2 °C) 60 14 75/40 94 % -- Nasal cannula 3 L/min     08/13/24 1214 -- 59 11 108/60 93 % -- Nasal cannula 3 L/min     08/13/24 1204 -- 60 12 107/60 93 % -- Nasal cannula 2 L/min     08/13/24 1200 -- 60 11 -- 93 % -- -- --     08/13/24 1134 -- 60 14 118/64 94 % -- Nasal cannula 3 L/min     08/13/24 1124 -- 60 11 110/44 92 % -- Nasal cannula 3 L/min     08/13/24 1114 98.8 °F (37.1 °C) 61 11 127/64 92 % -- Nasal cannula 3 L/min     08/13/24 1104 -- 60 10 122/44 92 % -- None (Room air) --     08/13/24 1054 98.2 °F (36.8 °C) 96 15 132/109 92 % -- None (Room air) -- PF    08/13/24 0949 -- 63 16 119/41 94 % -- None (Room air) -- SP    08/13/24 0912 98.9 °F (37.2 °C) 108 18 115/49 99 % -- None (Room air) -- SS    08/13/24 0518 97.3 °F (36.3 °C) 60 16 99/46 95 % -- None (Room air) -- AG    08/12/24 1958 98.2 °F (36.8 °C) 60 18 104/53 96 % -- None (Room air) -- AG    08/12/24 1511 -- -- -- -- -- 120 lb (54.4 kg) -- -- MO    08/12/24  1507 99.4 °F (37.4 °C) 59 18 100/38 96 % -- None (Room air) -- SS    08/12/24 1401 -- 62 18 104/45 96 % -- None (Room air) -- AM    08/12/24 0859 98 °F (36.7 °C) 63 18 110/59 100 % 120 lb (54.4 kg) None (Room air) -- JS       8/12 H&P     HISTORY AND PHYSICAL EXAMINATION     CHIEF COMPLAINT:  Sacral decubitus ulcer with necrotic changes and pancytopenia.       HISTORY OF PRESENT ILLNESS:  Patient is a 79-year-old  female who presented to the emergency department bedbound secondary to severe debilitating rheumatoid arthritis, underlying nonischemic cardiomyopathy.  Was hospitalized recently and discharged after a heart failure episode and treated with diuretics and dobutamine.  She had right and left heart catheterization.  Also, she had pleurocentesis, 1 L removed.  Fluid did not show evidence of infection.  She was restarted on methotrexate and prednisone for her severe rheumatoid arthritis.  Today, came back with painful decubitus ulcer, which the patient said started developing while she was in the hospital last time.  CBC showed white blood cell count of 1.3, hemoglobin 7.4, and platelet count 103.  Urinalysis showed evidence of urinary tract infection.  Chemistry showed GFR of 18, which is at baseline; BUN and creatinine of 59 and 2.69.  CT scan of the abdomen showed bladder mural wall thickening suggestive of cystitis, small to moderate right and moderate left loculated pleural effusion, chronic pancreatitis.  Patient will be admitted to the hospital for further management.  Started on IV Rocephin in the emergency room.       ASSESSMENT:    1.       Sacral stage III to IV decubitus ulcer with necrotic changes.  2.       Pancytopenia, most likely related to methotrexate use.   3.       Anemia, possible component of gastrointestinal blood loss, which is chronic.   4.       Urinary tract infection.   5.       Chronic kidney disease stage 3 to 4.  6.       Nonischemic cardiomyopathy.     PLAN:   Patient will be admitted to general medical floor.  Does not seem to be in fluid overload status.  We will obtain surgical consult to evaluate her sacral decubitus wound for possible debridement.  Start her on IV Rocephin.  Monitor her blood count.  Hold or possibly discontinue methotrexate.  Further recommendations to follow.      8/13 GEN SURGERY CONSULT NOTE       REASON FOR CONSULTATION:  Sacral decubitus ulcer.     HISTORY OF PRESENT ILLNESS:  The patient is 79.  History of rheumatoid arthritis, cardiomyopathy.  She was discharged recently with exacerbation of her heart failure, treated with diuretics and dobutamine.  I am asked to evaluated a painful necrotic unstageable decubitus ulcer.  Appreciate Wound Care evaluation as well.  White count is 1.3, platelets of 103.     PHYSICAL EXAMINATION:    GENERAL:  Alert and oriented, cachectic.    VITAL SIGNS:  Afebrile with stable vital signs.  HEENT:  Oropharynx clear.  Atraumatic.    NECK:  Supple without lymphadenopathy.  Positive JVD.    LUNGS:  Clear anteriorly.    HEART:  Irregular.  S1, S2.    ABDOMEN:  Soft, scaphoid.  SKIN:  Photos of the ulcers identified bilateral buttocks.  Unstageable.     IMPRESSION:  Necrotic buttock ulcers.     PLAN:  Debridement when cleared by Cardiology.  General surgery note     Sacral decubitus ulcer unstageable with necrosis.  Awaiting cardiology clearance               8/13 CARDIOLOGY CONSULT NOTE    Reason for Consultation:  Chronic systolic heart failure, preoperative cardiac evaluation/risk stratification     History of Present Illness:  Margaret Silverio is a a(n) 79 year old female who presents with non-healing sacral wound that is requiring I&D by surgical service today and cardiology has been consulted for cardiac risk evaluation.  She has followed in the past with my colleague Dr. Boles and recent office note has been copied into current EMR chart for reference.  Reviewed recent hospitalization in July 2024 and recent  visit to outpatient heart failure clinic.  Patient denies chest pain, dyspnea or lower extremity swelling.  She feels weight is down.      Impression:  Pressure injury of skin of sacral region  Non ischemic cardiomyopathy and chronic systolic heart failure  Diuresed with IV bumex and IV dobutamine, now on oral  Bumex. 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, s/p clipping, April 2023  Chronic anemia     Recommendations:  -tele monitoring  -She is at elevated cardiac risk but no immediate cardiac testing/imaging will help in lessening the risk for this semi-urgent procedure/surgery [sacral ulcer I&D) (no signs of acute coronary syndrome, uncontrolled arrhythmia or decompensated heart failure)  -careful monitoring to avoid dramatic shifts of BP and fluid shifts intra-operatively and post-operatively will help minimize cardiac complication  -on midodrine 5 mg BID, bumex 1 mg daily and coreg 3.125 mg BID, isordil 20 mg TID per review of heart failure clinic notes and recent hospital discharge summary  -limited in GDMT due to renal insufficiency and hypotension but on farxiga, entresto 49/51 mg BID (hold spironolactone with acute on chronic renal failure)  -continue amiodarone 200 mg daily  -DNAR/select code status and follows with palliative care as outpatient    8/13 BRIEF OP NOTE  Pre-Operative Diagnosis: Sacral ulcer     Post-Operative Diagnosis: Stage II sacral decubitus ulcer right buttock, lower back cyst measuring 3 cm      Procedure Performed:   Excision lower back cyst, Sharp excisional debridement right buttock sacral decubitus ulcer measuring 6 x 6 cm skin subcu    8/13 HOSPITALIST NOTE    Vital signs:  Temp:  [97.3 °F (36.3 °C)-99.4 °F (37.4 °C)] 98.8 °F (37.1 °C)  Pulse:  [] 60  Resp:  [10-18] 14  BP: ()/() 118/64  SpO2:  [92 %-99 %] 94 %        Physical Exam:    Gen: NAD AO x3  Chest: good air entry CTABL  CVS: normal s1 and s2 RR  Abd: NABS soft NT ND  Neuro: CN 2-12 grossly intact  Skin: sacral decubitus ulcer  Ext: no edema in bilateral LE        Diagnostic Data:    Labs:       Recent Labs   Lab 08/12/24  1006 08/13/24  0603   WBC 1.3* 2.5*   HGB 7.4* 8.3*   MCV 99.6 98.5   .0* 91.0*   BAND  --  1              Recent Labs   Lab 08/12/24  1006 08/13/24  0603   GLU 92 83   BUN 59* 53*   CREATSERUM 2.69* 2.52*   CA 9.1 9.2   * 133*   K 4.2 4.7    100   CO2 25.0 23.0              Assessment & Plan:  Sacral decubitus ulcer  Gsx on consult  Debridement later today  PRN pain control  Continue IVF and abx  UTI  UA reviewed  Ucx pending  Continue IV abx  Deescalate as indicated  NICM, HFrEF  Hx of paroxysmal afib  Hx of BRANDYN occlusion  AV paced  Continue home meds  CKD 3  Monitor renal function  Avoid nephrotoxic agents  Pancytopenia  Monitor labs

## 2024-08-13 NOTE — PROGRESS NOTES
Margaret Silverio Patient Status:  Inpatient    3/14/1945 MRN [id number]   Location [facility] 4W/SW/SE Attending Azul Sahni MD   Hosp Day # 1 PCP Johnathon Rodriguez DO     Cardiology Nocturnal APN Note    Briefly: (Documentation from chart review)     Margaret Silverio is a 78 y/o female who presented with sacral ulcer and has a PMH/PSH of:       Past Medical History:    Acute kidney insufficiency    Anemia    Arrhythmia    Back problem    CHF (congestive heart failure) (HCC)    CKD (chronic kidney disease) stage 3, GFR 30-59 ml/min (HCC)    Deep vein thrombosis (HCC)    on B.T for only a month, possibly Magen    Essential hypertension    High blood pressure    History of blood transfusion    Rheumatoid arthritis (HCC)    Seizure disorder (HCC)    Pt denied at screening call 24       Primary Cardiologist Elvi/Arie    Vital Signs:       2024     3:11 PM 2024     7:58 PM   Vitals History   BP  104/53   BP Location  Right arm   Pulse  60   Resp  18   Temp  98.2 °F (36.8 °C)   SpO2  96 %   Weight 120 lbs    BMI 20.6 kg/m2         Labs:   Lab Results   Component Value Date    WBC 1.3 2024    HGB 7.4 2024    HCT 23.6 2024    .0 2024    CREATSERUM 2.69 2024    BUN 59 2024     2024    K 4.2 2024     2024    CO2 25.0 2024    GLU 92 2024    CA 9.1 2024       Diagnostics:   CT ABDOMEN+PELVIS(CPT=74176)    Result Date: 2024  PROCEDURE:   CT ABDOMEN+PELVIS(CPT=74176)  COMPARISON:   [facility], XR CHEST AP/PA (1 VIEW) (CPT=71045), 2024, 7:57 AM.  [facility], CT ABDOMEN+PELVIS (CPT=74176), 2024, 12:41 PM.  INDICATIONS:   Bed sore, diarrhea x2wks  TECHNIQUE: CT images of the abdomen and pelvis were obtained without intravenous contrast material.  Automated exposure control for dose reduction was used. Adjustment of the mA and/or kV was done based on the  patient's size. Use of iterative reconstruction technique for dose reduction was used.  Dose information is transmitted to the ACR (American College of Radiology) NRDR (National Radiology Data Registry) which includes the Dose Index Registry.  FINDINGS:  FINDINGS:  LIVER: Normal unenhanced liver. BILIARY: No evidence for cholecystitis or biliary dilatation. SPLEEN: Normal unenhanced spleen.  PANCREAS: Multiple pancreatic calcifications with stable dilatation of pancreatic duct.  No acute finding or significant change. ADRENALS: Normal unenhanced adrenals.  KIDNEYS: Chronic right renal atrophy.  No contour deforming renal mass or renal obstruction. AORTA/VASCULAR: No aneurysm.  Chronic focal dissection of infrarenal abdominal aorta.  Coronary artery calcification. LYMPHADENOPATHY: None. GI/MESENTERY: Normal appendix.  Colonic diverticulosis.  No obstruction, bowel wall thickening or mesenteric mass.  ABDOMINAL WALL: Small fat containing right inguinal hernia. URINARY BLADDER: Generalized mural thickening of urinary bladder. ASCITES:    None. PELVIC ORGANS: No suspect pelvic mass. BONES: No suspect bone lesion.  Levoscoliosis and degenerative changes in the spine. LUNG BASES: Moderate loculated left and small to moderate loculated right pleural effusion. OTHER: Mitral valve prosthesis.  Defibrillator in place.         CONCLUSION:  1. Abnormal mural thickening of urinary bladder suggests cystitis.  Correlate with urinalysis for further evaluation.  Otherwise no acute finding in the abdomen or pelvis. 2. Small to moderate right and moderate left loculated pleural effusion. 3. Chronic pancreatitis.     Dictated by (CST): Jesus Jackson MD on 8/12/2024 at 10:33 AM     Finalized by (CST): Jesus Jackson MD on 8/12/2024 at 10:42 AM            Allergies:  Allergies   Allergen Reactions    Lisinopril Coughing       Medications:    ondansetron    metoclopramide    cefTRIAXone    sodium hypochlorite    acetaminophen     amiodarone    bumetanide    carvedilol    cyclobenzaprine    fentaNYL    ferrous sulfate    folic acid    gabapentin    HYDROcodone-acetaminophen    isosorbide dinitrate    midodrine    pantoprazole    sacubitril-valsartan    spironolactone    Assessment:   Pre-op clearance  - To be reviewed by MD for clearance   - Has dual chamber Abbott ICD - during surgery no reprogramming or magnet is needed. Not dependent.       Plan:    - Continue to monitor overnight  - Formal Cardiology consult to follow in AM.       KODI Jimenez  [facility]  8/13/2024  12:59 AM

## 2024-08-13 NOTE — PROGRESS NOTES
[facility]  part of [facility]     Hospitalist Progress Note     Margaret Silverio Patient Status:  Inpatient    3/14/1945 MRN [id number]   Location [facility] Attending Veto Zhang MD   Hosp Day # 1 PCP Johnathon Rodriguez,      Subjective:     Patient resting comfortably and in NAD. Denied any active complaints at the time of interview.   Scheduled for debridement later today.       Objective:    Review of Systems:   ROS completed; pertinent positive and negatives stated in subjective.      Vital signs:  Temp:  [97.3 °F (36.3 °C)-99.4 °F (37.4 °C)] 98.8 °F (37.1 °C)  Pulse:  [] 60  Resp:  [10-18] 14  BP: ()/() 118/64  SpO2:  [92 %-99 %] 94 %      Physical Exam:    Gen: NAD AO x3  Chest: good air entry CTABL  CVS: normal s1 and s2 RR  Abd: NABS soft NT ND  Neuro: CN 2-12 grossly intact  Skin: sacral decubitus ulcer  Ext: no edema in bilateral LE      Diagnostic Data:    Labs:  Recent Labs   Lab 24  1006 24  0603   WBC 1.3* 2.5*   HGB 7.4* 8.3*   MCV 99.6 98.5   .0* 91.0*   BAND  --  1       Recent Labs   Lab 24  1006 24  0603   GLU 92 83   BUN 59* 53*   CREATSERUM 2.69* 2.52*   CA 9.1 9.2   * 133*   K 4.2 4.7    100   CO2 25.0 23.0       Estimated Creatinine Clearance: 15.8 mL/min (A) (based on SCr of 2.52 mg/dL (H)).    No results for input(s): \"PTP\", \"INR\" in the last 168 hours.           Imaging: Imaging data reviewed in Epic.    Medications:    cefTRIAXone  1 g Intravenous Q24H    [Transfer Hold] sodium hypochlorite   Topical BID    [Transfer Hold] amiodarone  200 mg Oral Daily    [Transfer Hold] bumetanide  1 mg Oral Daily    [Transfer Hold] carvedilol  3.125 mg Oral BID with meals    [Transfer Hold] cyclobenzaprine  10 mg Oral Nightly    [Transfer Hold] fentaNYL  1 patch Transdermal Q72H    [Transfer Hold] ferrous sulfate  325 mg Oral Daily with breakfast    [Transfer Hold] folic acid   800 mcg Oral Daily    [Transfer Hold] gabapentin  300 mg Oral TID    [Transfer Hold] isosorbide dinitrate  20 mg Oral TID (Nitrates)    [Transfer Hold] midodrine  5 mg Oral BID AC    [Transfer Hold] pantoprazole  40 mg Oral BID AC    [Transfer Hold] sacubitril-valsartan  1 tablet Oral BID       Assessment & Plan:     Sacral decubitus ulcer  Gsx on consult  Debridement later today  PRN pain control  Continue IVF and abx  UTI  UA reviewed  Ucx pending  Continue IV abx  Deescalate as indicated  NICM, HFrEF  Hx of paroxysmal afib  Hx of BRANDYN occlusion  AV paced  Continue home meds  CKD 3  Monitor renal function  Avoid nephrotoxic agents  Pancytopenia  Monitor labs      Plan of care discussed with patient or family at bedside.      Supplementary Documentation:     Quality:  DVT Prophylaxis: per surgery  CODE status: DNAR/Select      Estimated date of discharge: TBD  Discharge is dependent on: clinical stability  At this point Ms. Silverio is expected to be discharge to: home             **Certification      PHYSICIAN Certification of Need for Inpatient Hospitalization - Initial Certification    Patient will require inpatient services that will reasonably be expected to span two midnight's based on the clinical documentation in H+P.   Based on patients current state of illness, I anticipate that, after discharge, patient will require TBD.      Veto Zhang MD  Hospitalist    MDM: High, I personally reviewed the available laboratories, imaging including CT. I discussed the case with RN. I ordered laboratories, studies including AM labs.  Medical decision making high, risk is high.       The 21st Century Cures Act makes medical notes like these available to patients in the interest of transparency. Please be advised this is a medical document. Medical documents are intended to carry relevant information, facts as evident, and the clinical opinion of the practitioner. The medical note is intended as peer to peer communication  and may appear blunt or direct. It is written in medical language and may contain abbreviations or verbiage that are unfamiliar.

## 2024-08-13 NOTE — PLAN OF CARE
Patient AO x4. Buttocks dressing changed per order. V/s stable. On RA. Does not want to lay on right side due to pain to right buttocks wound. SCDs on. Voiding with purewick. NPO since midnight. Call light within reach. Fall precautions. Plan for cardiac consult. Possible I/D today at 0925 with Dr. Mckeon.  Problem: Patient Centered Care  Goal: Patient preferences are identified and integrated in the patient's plan of care  Description: Interventions:  - What would you like us to know as we care for you? Home with   - Provide timely, complete, and accurate information to patient/family  - Incorporate patient and family knowledge, values, beliefs, and cultural backgrounds into the planning and delivery of care  - Encourage patient/family to participate in care and decision-making at the level they choose  - Honor patient and family perspectives and choices  Outcome: Progressing     Problem: Patient/Family Goals  Goal: Patient/Family Long Term Goal  Description: Patient's Long Term Goal: wound healing    Interventions:  - increase protein intake  -ambulate and reposition as tolerated  -monitor labs, v/s    - See additional Care Plan goals for specific interventions  Outcome: Progressing  Goal: Patient/Family Short Term Goal  Description: Patient's Short Term Goal:     Interventions:   -   - See additional Care Plan goals for specific interventions  Outcome: Progressing     Problem: PAIN - ADULT  Goal: Verbalizes/displays adequate comfort level or patient's stated pain goal  Description: INTERVENTIONS:  - Encourage pt to monitor pain and request assistance  - Assess pain using appropriate pain scale  - Administer analgesics based on type and severity of pain and evaluate response  - Implement non-pharmacological measures as appropriate and evaluate response  - Consider cultural and social influences on pain and pain management  - Manage/alleviate anxiety  - Utilize distraction and/or relaxation techniques  -  Monitor for opioid side effects  - Notify MD/LIP if interventions unsuccessful or patient reports new pain  - Anticipate increased pain with activity and pre-medicate as appropriate  Outcome: Progressing     Problem: RISK FOR INFECTION - ADULT  Goal: Absence of fever/infection during anticipated neutropenic period  Description: INTERVENTIONS  - Monitor WBC  - Administer growth factors as ordered  - Implement neutropenic guidelines  Outcome: Progressing     Problem: SAFETY ADULT - FALL  Goal: Free from fall injury  Description: INTERVENTIONS:  - Assess pt frequently for physical needs  - Identify cognitive and physical deficits and behaviors that affect risk of falls.  - [place] fall precautions as indicated by assessment.  - Educate pt/family on patient safety including physical limitations  - Instruct pt to call for assistance with activity based on assessment  - Modify environment to reduce risk of injury  - Provide assistive devices as appropriate  - Consider OT/PT consult to assist with strengthening/mobility  - Encourage toileting schedule  Outcome: Progressing     Problem: SKIN/TISSUE INTEGRITY - ADULT  Goal: Skin integrity remains intact  Description: INTERVENTIONS  - Assess and document risk factors for pressure ulcer development  - Assess and document skin integrity  - Monitor for areas of redness and/or skin breakdown  - Initiate interventions, skin care algorithm/standards of care as needed  Outcome: Progressing  Goal: Incision(s), wounds(s) or drain site(s) healing without S/S of infection  Description: INTERVENTIONS:  - Assess and document risk factors for pressure ulcer development  - Assess and document skin integrity  - Assess and document dressing/incision, wound bed, drain sites and surrounding tissue  - Implement wound care per orders  - Initiate isolation precautions as appropriate  - Initiate Pressure Ulcer prevention bundle as indicated  Outcome: Progressing  Goal: Oral mucous membranes  remain intact  Description: INTERVENTIONS  - Assess oral mucosa and hygiene practices  - Implement preventative oral hygiene regimen  - Implement oral medicated treatments as ordered  Outcome: Progressing

## 2024-08-13 NOTE — BRIEF OP NOTE
Pre-Operative Diagnosis: Sacral ulcer     Post-Operative Diagnosis: Stage II sacral decubitus ulcer right buttock, lower back cyst measuring 3 cm     Procedure Performed:   Excision lower back cyst, Sharp excisional debridement right buttock sacral decubitus ulcer measuring 6 x 6 cm skin subcu    Surgeons and Role:     * Eben Mckeon MD - Primary    Assistant(s):  Surgical Assistant.: Ta Covington CSA     Surgical Findings: Above     Specimen: Done     Estimated Blood Loss: 5cc    Dictation Number:   none    Eben Mckeon MD  8/13/2024  11:14 AM

## 2024-08-13 NOTE — CONSULTS
[facility]    PATIENT'S NAME: DORIS ALEMAN   ATTENDING PHYSICIAN: Veto Zhang MD   CONSULTING PHYSICIAN: Eben Mckeon MD   PATIENT ACCOUNT#:   [id number]    LOCATION:  [address] [place] [address]  MEDICAL RECORD #:   [id number]       YOB: 1945  ADMISSION DATE:       08/12/2024      CONSULT DATE:  08/12/2024    REPORT OF CONSULTATION      REASON FOR CONSULTATION:  Sacral decubitus ulcer.    HISTORY OF PRESENT ILLNESS:  The patient is 79.  History of rheumatoid arthritis, cardiomyopathy.  She was discharged recently with exacerbation of her heart failure, treated with diuretics and dobutamine.  I am asked to evaluated a painful necrotic unstageable decubitus ulcer.  Appreciate Wound Care evaluation as well.  White count is 1.3, platelets of 103.    PAST MEDICAL HISTORY:  Above.  Ejection fraction of 30, ICD.  GI bleed, AV malformation, off anticoagulation, chronic renal disease, arthritis, paroxysmal atrial fibrillation.    PAST SURGICAL HISTORY:  Mitral valve replacement, bilateral shoulder arthroplasty.      MEDICATIONS:  See reconciliation.    ALLERGIES:  Lisinopril.     SOCIAL HISTORY:  Bedbound.     FAMILY HISTORY:  Significant for hypertension.    REVIEW OF SYSTEMS:  Frequent diarrhea.  Unstageable sacral decubitus ulcers.        PHYSICAL EXAMINATION:    GENERAL:  Alert and oriented, cachectic.    VITAL SIGNS:  Afebrile with stable vital signs.  HEENT:  Oropharynx clear.  Atraumatic.    NECK:  Supple without lymphadenopathy.  Positive JVD.    LUNGS:  Clear anteriorly.    HEART:  Irregular.  S1, S2.    ABDOMEN:  Soft, scaphoid.  SKIN:  Photos of the ulcers identified bilateral buttocks.  Unstageable.    IMPRESSION:  Necrotic buttock ulcers.    PLAN:  Debridement when cleared by Cardiology.    Dictated By Eben Mckeon MD  d: 08/13/2024 08:58:44  t: 08/13/2024 09:11:11  Job [id number]  GL/    cc: Veto Zhang MD

## 2024-08-13 NOTE — CONSULTS
[facility]  Cardiology Consultation    Margaret Silverio Patient Status:  Inpatient    3/14/1945 MRN [id number]   Location [facility] 4W/SW/SE Attending Veto Zhang MD   Hosp Day # 1 PCP Johnathon Rodriguez DO     Reason for Consultation:  Chronic systolic heart failure, preoperative cardiac evaluation/risk stratification    History of Present Illness:  Margaret Silverio is a a(n) 79 year old female who presents with non-healing sacral wound that is requiring I&D by surgical service today and cardiology has been consulted for cardiac risk evaluation.  She has followed in the past with my colleague Dr. Boles and recent office note has been copied into current EMR chart for reference.  Reviewed recent hospitalization in 2024 and recent visit to outpatient heart failure clinic.  Patient denies chest pain, dyspnea or lower extremity swelling.  She feels weight is down.    History:  Past Medical History:    Acute kidney insufficiency    Anemia    Arrhythmia    Back problem    CHF (congestive heart failure) (HCC)    CKD (chronic kidney disease) stage 3, GFR 30-59 ml/min (HCC)    Deep vein thrombosis (HCC)    on B.T for only a month, possibly Magen    Essential hypertension    High blood pressure    History of blood transfusion    Rheumatoid arthritis (HCC)    Seizure disorder (HCC)    Pt denied at screening call 24     Past Surgical History:   Procedure Laterality Date    Cabg      Cardiac pacemaker placement      Colonoscopy N/A 2024    Dr. Rios    Colonoscopy N/A 2024    Procedure: COLONOSCOPY;  Surgeon: Zoraida Rios MD;  Location: [facility] ENDOSCOPY    Egd N/A 2024    Dr. Rios    Fracture surgery      Other surgical history      Heart Surgery     Other surgical history      Bilateral shoulder replacement      No family history on file.   reports that she quit smoking about 10 years ago. Her smoking use included cigarettes. She has never used smokeless  tobacco. She reports that she does not drink alcohol and does not use drugs.    Allergies:  Allergies   Allergen Reactions    Lisinopril Coughing       Medications:    Current Facility-Administered Medications:     morphINE PF 2 MG/ML injection 1 mg, 1 mg, Intravenous, Q4H PRN    ondansetron (Zofran) 4 MG/2ML injection 4 mg, 4 mg, Intravenous, Q6H PRN    metoclopramide (Reglan) 5 mg/mL injection 5 mg, 5 mg, Intravenous, Q8H PRN    cefTRIAXone (Rocephin) 1 g in sodium chloride 0.9% 100 mL IVPB-ADDV, 1 g, Intravenous, Q24H    sodium hypochlorite (Dakin's) 0.125 % external solution, , Topical, BID    acetaminophen (Tylenol Extra Strength) tab 500 mg, 500 mg, Oral, Q4H PRN    amiodarone (Pacerone) tab 200 mg, 200 mg, Oral, Daily    bumetanide (Bumex) tab 1 mg, 1 mg, Oral, Daily    carvedilol (Coreg) tab 3.125 mg, 3.125 mg, Oral, BID with meals    cyclobenzaprine (Flexeril) tab 10 mg, 10 mg, Oral, Nightly    fentaNYL (Duragesic) 12 MCG/HR patch 1 patch, 1 patch, Transdermal, Q72H    ferrous sulfate DR tab 325 mg, 325 mg, Oral, Daily with breakfast    folic acid (Folvite) tab 800 mcg, 800 mcg, Oral, Daily    gabapentin (Neurontin) cap 300 mg, 300 mg, Oral, TID    HYDROcodone-acetaminophen (Norco)  MG per tab 1 tablet, 1 tablet, Oral, Q8H PRN    isosorbide dinitrate (Isordil Titradose) tab 20 mg, 20 mg, Oral, TID (Nitrates)    midodrine (ProAmatine) tab 5 mg, 5 mg, Oral, BID AC    pantoprazole (Protonix) DR tab 40 mg, 40 mg, Oral, BID AC    sacubitril-valsartan (Entresto) 49-51 MG per tab 1 tablet, 1 tablet, Oral, BID    spironolactone (Aldactone) tab 25 mg, 25 mg, Oral, Daily    Review of Systems:  A comprehensive review of systems was negative if not otherwise mention in above HPI.    BP 99/46 (BP Location: Right arm)   Pulse 60   Temp 97.3 °F (36.3 °C) (Oral)   Resp 16   Wt 120 lb (54.4 kg)   SpO2 95%   BMI 20.60 kg/m²   Temp (24hrs), Av.3 °F (36.8 °C), Min:97.3 °F (36.3 °C), Max:99.4 °F (37.4 °C)        Intake/Output Summary (Last 24 hours) at 8/13/2024 0906  Last data filed at 8/13/2024 0638  Gross per 24 hour   Intake 420 ml   Output 220 ml   Net 200 ml     Wt Readings from Last 3 Encounters:   08/12/24 120 lb (54.4 kg)   08/01/24 112 lb 1.6 oz (50.8 kg)   07/22/24 115 lb 3.2 oz (52.3 kg)       Physical Exam:   General: Alert and oriented x 3. No apparent distress. No respiratory or constitutional distress.  HEENT: Normocephalic, anicteric sclera, neck supple.  Neck: No JVD, carotids 2+, no bruits.  Cardiac: Regular rate and rhythm. S1, S2 normal. No murmur, pericardial rub, S3.  Lungs: Diminished without wheezes, rales or rhonchi.  Abdomen: Soft, non-tender.  Extremities: Without clubbing, cyanosis or edema.  Peripheral pulses are 2+.  Neurologic: Alert and oriented, normal affect.  Skin: Warm and dry.     Laboratory Data:  Lab Results   Component Value Date    WBC 2.5 08/13/2024    HGB 8.3 08/13/2024    HCT 26.4 08/13/2024    PLT 91.0 08/13/2024    CREATSERUM 2.52 08/13/2024    BUN 53 08/13/2024     08/13/2024    K 4.7 08/13/2024     08/13/2024    CO2 23.0 08/13/2024    GLU 83 08/13/2024    CA 9.2 08/13/2024     No results for input(s): \"TROP\", \"TROPHS\", \"CK\" in the last 168 hours.      Imaging/cardiac procedures:  RHC 7/17 - RV 28/29, mean RA 22 mmHg, PCWP 16 mmHg, PA 47/21, CI 2.2    Echo 6/20/24 Conclusions:   1. Left ventricle: The cavity size was normal. Wall thickness was mildly increased. Systolic function was markedly reduced. The estimated ejection fraction was 30-35%, by biplane method of disks. Dyskinesis of the anteroseptal and anterior walls. Unable to assess LV diastolic function due to valvular repair/replacement.   2. Right ventricle: The cavity size was increased. Pacer wire noted in the right ventricle.   3. Left atrium: The atrium was markedly dilated.   4. Right atrium: The atrium was dilated.   5. Atrial septum: There was no atrial level shunt.   6. Aortic valve: The  findings were consistent with very mild stenosis. The peak systolic velocity was 1.71m/sec. The mean systolic gradient was 6mm Hg. The valve area (VTI) was 1.71cm^2.   7. Mitral valve: A bioprosthesis is present. There was no significant regurgitation. The mean diastolic gradient was 6mm Hg.   8. Tricuspid valve: There was wide-open regurgitation.     Impression:  Pressure injury of skin of sacral region  Non ischemic cardiomyopathy and chronic systolic heart failure  Diuresed with IV bumex and IV dobutamine, now on oral  Bumex. 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, s/p clipping, April 2023  Chronic anemia    Recommendations:  -tele monitoring  -She is at elevated cardiac risk but no immediate cardiac testing/imaging will help in lessening the risk for this semi-urgent procedure/surgery [sacral ulcer I&D) (no signs of acute coronary syndrome, uncontrolled arrhythmia or decompensated heart failure)  -careful monitoring to avoid dramatic shifts of BP and fluid shifts intra-operatively and post-operatively will help minimize cardiac complication  -on midodrine 5 mg BID, bumex 1 mg daily and coreg 3.125 mg BID, isordil 20 mg TID per review of heart failure clinic notes and recent hospital discharge summary  -limited in GDMT due to renal insufficiency and hypotension but on farxiga, entresto 49/51 mg BID (hold spironolactone with acute on chronic renal failure)  -continue amiodarone 200 mg daily  -DNAR/select code status and  follows with palliative care as outpatient    D/w patient and bedside RN    Thank you for allowing me to participate in the care of your patient.    Blake Puckett MD  8/13/2024  9:06 AM

## 2024-08-14 LAB
ANION GAP SERPL CALC-SCNC: 10 MMOL/L (ref 0–18)
BUN BLD-MCNC: 54 MG/DL (ref 9–23)
BUN/CREAT SERPL: 20.6 (ref 10–20)
CALCIUM BLD-MCNC: 9.1 MG/DL (ref 8.7–10.4)
CHLORIDE SERPL-SCNC: 99 MMOL/L (ref 98–112)
CO2 SERPL-SCNC: 24 MMOL/L (ref 21–32)
CREAT BLD-MCNC: 2.62 MG/DL
DEPRECATED RDW RBC AUTO: 65.1 FL (ref 35.1–46.3)
EGFRCR SERPLBLD CKD-EPI 2021: 18 ML/MIN/1.73M2 (ref 60–?)
ERYTHROCYTE [DISTWIDTH] IN BLOOD BY AUTOMATED COUNT: 18.7 % (ref 11–15)
GLUCOSE BLD-MCNC: 93 MG/DL (ref 70–99)
HCT VFR BLD AUTO: 25.6 %
HGB BLD-MCNC: 7.9 G/DL
MCH RBC QN AUTO: 30.7 PG (ref 26–34)
MCHC RBC AUTO-ENTMCNC: 30.9 G/DL (ref 31–37)
MCV RBC AUTO: 99.6 FL
OSMOLALITY SERPL CALC.SUM OF ELEC: 290 MOSM/KG (ref 275–295)
PLATELET # BLD AUTO: 131 10(3)UL (ref 150–450)
POTASSIUM SERPL-SCNC: 4.6 MMOL/L (ref 3.5–5.1)
RBC # BLD AUTO: 2.57 X10(6)UL
SODIUM SERPL-SCNC: 133 MMOL/L (ref 136–145)
WBC # BLD AUTO: 4.2 X10(3) UL (ref 4–11)

## 2024-08-14 PROCEDURE — 99233 SBSQ HOSP IP/OBS HIGH 50: CPT | Performed by: HOSPITALIST

## 2024-08-14 RX ORDER — BUMETANIDE 1 MG/1
1 TABLET ORAL
Status: DISCONTINUED | OUTPATIENT
Start: 2024-08-15 | End: 2024-08-17

## 2024-08-14 RX ORDER — BUMETANIDE 0.25 MG/ML
1 INJECTION INTRAMUSCULAR; INTRAVENOUS ONCE
Status: DISCONTINUED | OUTPATIENT
Start: 2024-08-14 | End: 2024-08-16

## 2024-08-14 NOTE — PLAN OF CARE
Problem: Patient Centered Care  Goal: Patient preferences are identified and integrated in the patient's plan of care  Description: Interventions:  - What would you like us to know as we care for you?   - Provide timely, complete, and accurate information to patient/family  - Incorporate patient and family knowledge, values, beliefs, and cultural backgrounds into the planning and delivery of care  - Encourage patient/family to participate in care and decision-making at the level they choose  - Honor patient and family perspectives and choices  Outcome: Progressing     Problem: Patient/Family Goals  Goal: Patient/Family Long Term Goal  Description: Patient's Long Term Goal:     Interventions:  -   - See additional Care Plan goals for specific interventions  Outcome: Progressing  Goal: Patient/Family Short Term Goal  Description: Patient's Short Term Goal:     Interventions:   -   - See additional Care Plan goals for specific interventions  Outcome: Progressing     Problem: PAIN - ADULT  Goal: Verbalizes/displays adequate comfort level or patient's stated pain goal  Description: INTERVENTIONS:  - Encourage pt to monitor pain and request assistance  - Assess pain using appropriate pain scale  - Administer analgesics based on type and severity of pain and evaluate response  - Implement non-pharmacological measures as appropriate and evaluate response  - Consider cultural and social influences on pain and pain management  - Manage/alleviate anxiety  - Utilize distraction and/or relaxation techniques  - Monitor for opioid side effects  - Notify MD/LIP if interventions unsuccessful or patient reports new pain  - Anticipate increased pain with activity and pre-medicate as appropriate  Outcome: Progressing     Problem: RISK FOR INFECTION - ADULT  Goal: Absence of fever/infection during anticipated neutropenic period  Description: INTERVENTIONS  - Monitor WBC  - Administer growth factors as ordered  - Implement  neutropenic guidelines  Outcome: Progressing     Problem: SAFETY ADULT - FALL  Goal: Free from fall injury  Description: INTERVENTIONS:  - Assess pt frequently for physical needs  - Identify cognitive and physical deficits and behaviors that affect risk of falls.  - [place] fall precautions as indicated by assessment.  - Educate pt/family on patient safety including physical limitations  - Instruct pt to call for assistance with activity based on assessment  - Modify environment to reduce risk of injury  - Provide assistive devices as appropriate  - Consider OT/PT consult to assist with strengthening/mobility  - Encourage toileting schedule  Outcome: Progressing     Problem: SKIN/TISSUE INTEGRITY - ADULT  Goal: Skin integrity remains intact  Description: INTERVENTIONS  - Assess and document risk factors for pressure ulcer development  - Assess and document skin integrity  - Monitor for areas of redness and/or skin breakdown  - Initiate interventions, skin care algorithm/standards of care as needed  Outcome: Progressing  Goal: Incision(s), wounds(s) or drain site(s) healing without S/S of infection  Description: INTERVENTIONS:  - Assess and document risk factors for pressure ulcer development  - Assess and document skin integrity  - Assess and document dressing/incision, wound bed, drain sites and surrounding tissue  - Implement wound care per orders  - Initiate isolation precautions as appropriate  - Initiate Pressure Ulcer prevention bundle as indicated  Outcome: Progressing  Goal: Oral mucous membranes remain intact  Description: INTERVENTIONS  - Assess oral mucosa and hygiene practices  - Implement preventative oral hygiene regimen  - Implement oral medicated treatments as ordered  Outcome: Progressing

## 2024-08-14 NOTE — PROGRESS NOTES
Progress Note  Margaret Silverio Patient Status:  Inpatient    3/14/1945 MRN [id number]   Location [facility] 4W/SW/SE Attending Fernando Galan MD   Hosp Day # 2 PCP Johnathon Rodriguez,      SUBJECTIVE:    Denies chest pain, no orthopnea, no pain to incision site.     VITALS:  /54 (BP Location: Right arm)   Pulse 77   Temp 98.4 °F (36.9 °C) (Temporal)   Resp 18   Wt 120 lb (54.4 kg)   SpO2 100%   BMI 20.60 kg/m²     INTAKE/OUTPUT:    Intake/Output Summary (Last 24 hours) at 2024 1405  Last data filed at 2024 1000  Gross per 24 hour   Intake 200 ml   Output 100 ml   Net 100 ml     Last 3 Weights   24 1511 120 lb (54.4 kg)   24 0859 120 lb (54.4 kg)   24 1100 112 lb 1.6 oz (50.8 kg)   24 0355 115 lb 3.2 oz (52.3 kg)   24 0620 109 lb 6.4 oz (49.6 kg)   24 0450 112 lb 11.2 oz (51.1 kg)   24 0458 113 lb 9.6 oz (51.5 kg)   24 0508 117 lb 14.4 oz (53.5 kg)   24 0614 123 lb 12.8 oz (56.2 kg)   07/15/24 1830 124 lb 1.6 oz (56.3 kg)     LABS:  Recent Labs   Lab 24  1006 24  0603 24  0639   GLU 92 83 93   BUN 59* 53* 54*   CREATSERUM 2.69* 2.52* 2.62*   EGFRCR 18* 19* 18*   CA 9.1 9.2 9.1   * 133* 133*   K 4.2 4.7 4.6    100 99   CO2 25.0 23.0 24.0     Recent Labs   Lab 24  1006 24  0603 24  0639   RBC 2.37* 2.68* 2.57*   HGB 7.4* 8.3* 7.9*   HCT 23.6* 26.4* 25.6*   MCV 99.6 98.5 99.6   MCH 31.2 31.0 30.7   MCHC 31.4 31.4 30.9*   RDW 17.9* 18.2* 18.7*   NEPRELIM 0.63* 1.13*  --    WBC 1.3* 2.5* 4.2   .0* 91.0* 131.0*     No results for input(s): \"TROP\", \"CK\" in the last 168 hours.    DIAGNOSTICS:    TELEMETRY: Not on tele     ROS: Negative unless noted above     PHYSICAL EXAM:  General: Alert and oriented x 3. No apparent distress.  HEENT: Normocephalic, sclera are nonicteric. Hearing decreased  bilaterally.  Neck: No JVD or Carotid bruits. Trachea midline.   Cardiac: Regular  rate and rhythm. S1, S2 auscultated. No murmurs, rubs, or gallops appreciated.   Lungs: A/p dullness. Chest expansion symmetrical. Regular effort.  Abdomen: Soft, non-tender, +BS. No hepatosplenomegaly or appreciable masses.   Extremities: Without clubbing, cyanosis or edema.  Peripheral pulses are 2+.  Neurologic: Motor and sensory nerves grossly intact.   Psych: Appropriate affect   Skin: Warm and dry. No obvious lesions, wounds, or ulcerations.     MEDICATIONS:   cefTRIAXone  1 g Intravenous Q24H    sodium hypochlorite   Topical BID    amiodarone  200 mg Oral Daily    bumetanide  1 mg Oral Daily    carvedilol  3.125 mg Oral BID with meals    cyclobenzaprine  10 mg Oral Nightly    fentaNYL  1 patch Transdermal Q72H    ferrous sulfate  325 mg Oral Daily with breakfast    folic acid  800 mcg Oral Daily    gabapentin  300 mg Oral TID    isosorbide dinitrate  20 mg Oral TID (Nitrates)    midodrine  5 mg Oral BID AC    pantoprazole  40 mg Oral BID AC    sacubitril-valsartan  1 tablet Oral BID     ASSESSMENT:    S/p Excision of cyst and sharp excisional debridement decub ulcer  - Per general surgery     Chronic HFrEF, NICM, LVEF 30-35% (Improved from 15-20%) Wide-Open TR  - Appears compensated  - Tolerating GDMT for HF. ARNI, BB, Bumex. Not historically on MRNA with hx of hypotension and K on the high end of normal   - Miss morning Bumex 2/2 borderline blood pressure    PAF, [place], [place] DC-ICD 9/2023  Possible BRANDYN Occlusion w/ surgery per EP  - Controlled on BB and Amio     GEORGE on CKD IIIb  - Baseline Cr ~ 2  - Cr 2.62 today     S/p Bioprosthetic Mitral Valve 10/2022  Chronic Anemia/Thrombocytopenia/ Pancytopenia- Hgb slightly below baseline but stable   Hx Recurrent GIB   Hx of Possible TIA/Seizures November 2022    PLAN:  - Give 1 mg IV Bumex now, has missed PO diuretics, parameters for diuretics in place, Strict I/Os and daily weights   - Increase Bumex to 1 mg PO BID tomorrow, at last discharge she was  compensated at this dose. If needed can decrease Entresto, switch Coreg to metoprolol, or increase Midodrine     Plan of care discussed with patient and RN.     Catrachita Mason, KODI  8/14/2024  2:05 PM  [phone] ([place])  [phone] ([place])

## 2024-08-14 NOTE — PROGRESS NOTES
[facility]  part of [facility]    Progress Note    Margaret Silverio Patient Status:  Inpatient    3/14/1945 MRN [id number]   Location [facility] 4W/SW/SE Attending Fernando Galan MD   Hosp Day # 2 PCP Johnathon Rodriguez,        Subjective:   POD1 s/p excision of cyst and sharp excisional debridement of buttock decub ulcer. Pt reports incisional pain, no fever or chills.     Objective:   Vital Signs:  Blood pressure 111/54, pulse 77, temperature 98.4 °F (36.9 °C), temperature source Temporal, resp. rate 18, weight 120 lb (54.4 kg), SpO2 100%.    Physical Exam     General: No acute distress. Alert and oriented x 3.  HEENT: Moist mucous membranes. Anicteric.   Neck: Supple, No JVD.   Respiratory: Nonlabored resp.  Cardiovascular: Regular rate.   Abdomen: Soft, NT, no rebound tnd, no guarding, nondistended.  No masses. Normal bowel sounds.    Neurologic: No focal neurological deficits.  Musculoskeletal: Full range of motion of all extremities. No calf tenderness. No swelling noted.  Integument: No lesions. No erythema. Wound clean and dressed.   Psychiatric: Appropriate mood and affect.         Assessment and Plan:     Pressure injury of skin of sacral region, unspecified injury stage      Pancytopenia (HCC)      Urinary tract infection without hematuria, site unspecified      Anemia, unspecified type    Continue local wound care, dressing changes, and abx. Follow up wound cultures.     Results:     Lab Results   Component Value Date    WBC 4.2 2024    HGB 7.9 (L) 2024    HCT 25.6 (L) 2024    .0 (L) 2024    CREATSERUM 2.62 (H) 2024    BUN 54 (H) 2024     (L) 2024    K 4.6 2024    CL 99 2024    CO2 24.0 2024    GLU 93 2024    CA 9.1 2024    ALB 4.0 2024    ALKPHO 328 (H) 2024    BILT 0.8 2024    TP 7.7 2024    AST 23 2024    ALT 11 2024    PTT 35.2 2024     INR 1.25 (H) 02/09/2024    T4F 1.4 06/18/2024    TSH 7.534 (H) 06/18/2024    LIP 32 01/13/2024    ESRML 40 (H) 07/17/2024    CRP 9.00 (H) 07/17/2024    MG 2.0 07/20/2024    PHOS 3.8 07/01/2024    B12 >2,000 (H) 07/01/2024       No results found.                Anshul Soriano PA-C  8/14/2024

## 2024-08-14 NOTE — DIETARY NOTE
ADULT NUTRITION INITIAL ASSESSMENT    Pt is at high nutrition risk.  Pt meets severe malnutrition criteria.      CRITERIA FOR MALNUTRITION DIAGNOSIS:  Criteria for severe malnutrition diagnosis: acute illness/injury related to wt loss greater than 7.5% in 3 months, energy intake less than 50% for greater than 5 days, body fat moderate depletion, and muscle mass moderate depletion.    RECOMMENDATIONS TO MD: See Nutrition Intervention for oral nutritional supplement (ONS) specifics     ADMITTING DIAGNOSIS:  Urinary tract infection without hematuria, site unspecified [N39.0]  Anemia, unspecified type [D64.9]  Pressure injury of skin of sacral region, unspecified injury stage [L89.159]  PERTINENT PAST MEDICAL HISTORY:   Past Medical History:    Acute kidney insufficiency    Anemia    Arrhythmia    Back problem    CHF (congestive heart failure) (HCC)    CKD (chronic kidney disease) stage 3, GFR 30-59 ml/min (HCC)    Deep vein thrombosis (HCC)    on B.T for only a month, possibly Magen    Essential hypertension    High blood pressure    History of blood transfusion    Rheumatoid arthritis (HCC)    Seizure disorder (HCC)    Pt denied at screening call 6/28/24     PATIENT STATUS: Initial 08/14/24: Pt admit for urinary tract infection without hematuria, anemia and pressure injury or skin of sacral region. PMH sig for HTN, CHF, CKD and others noted above. Pt assessed due to screening at risk for decreased appetite, unintentional weight loss and pressure injury (PI) - unstageable and maceration to right buttock and stage 2 & shear to left buttock. Pt known to nutrition services from previous admissions, last seen 6/19/24 and provided with HF diet education.  Chart reviewed, pt admitted with c/o sacral wound x 3 weeks and dark/loose stools. Pt with recent admission (7/15-7/22) for acute on chronic congestive heart failure - treated with diuretics and dobutamine. General surgery consulted for debridement.  POD#1 s/p sacral  debridement. Discussion with RN, poor appetite. Intakes reviewed, 25-40% x 2 meals since admit (poor). Pt visited, resting with eyes closed but answering majority of questions. Pt reports poor appetite/intake but unable to report timeframe. Pt reports not eating. Pt notes used to drink Ensure but stopped due to going right through causing pain/difficulties prior to surgery. Pt reports weight is not good, usual body weight (UBW) 142# but unable to determine last time weighed this. Current weight 120#. EMR weight review, last known weight 112# 2 oz on 8/1/24. Per EMR weight review, pt noted weighing 129# 10 oz on 7/1/24 - 9.6# or 7.4% weight loss x 1 month (significant), 129# 13 oz on 5/16/24 - 9.8# or 7.6% weight loss x 3 months (significant), 149# 14 oz on 2/3/24 - 29.9# or 19.9% weight loss x 6 months (significant) and 141# 14 oz on 8/21/23 - 21.9# or 15.4% weight loss x 1 year (non-significant). Nutrition focused physical exam (NFPE) completed, see below for details. Encouraged small frequent meals with emphasis on high calorie and high protein and ONS to help maximize nutrition. +ONS  per discussion.    FOOD/NUTRITION RELATED HISTORY:  Appetite:  decreased/poor appetite PTA per pt. Not eating  Intake: ~25-40% x2 meals documented since admit  Intake Meeting Needs: No, but oral nutrition supplements (ONS) to maximize   Percent Meals Eaten (last 6 days)       Date/Time Percent Meals Eaten (%)    08/13/24 1230 40 %    08/14/24 1000 25 %          Food Allergies: No Known Food Allergies (NKFA)  Cultural/Ethnic/Confucianist Preferences: Not Obtained    GASTROINTESTINAL: +BM 8/12/2024    MEDICATIONS: reviewed Electrolyte replacement per protocol (non-cardiac)   cefTRIAXone  1 g Intravenous Q24H    sodium hypochlorite   Topical BID    amiodarone  200 mg Oral Daily    bumetanide  1 mg Oral Daily    carvedilol  3.125 mg Oral BID with meals    cyclobenzaprine  10 mg Oral Nightly    fentaNYL  1 patch Transdermal Q72H    ferrous  sulfate  325 mg Oral Daily with breakfast    folic acid  800 mcg Oral Daily    gabapentin  300 mg Oral TID    isosorbide dinitrate  20 mg Oral TID (Nitrates)    midodrine  5 mg Oral BID AC    pantoprazole  40 mg Oral BID AC    sacubitril-valsartan  1 tablet Oral BID     LABS: reviewed Hypernatremia (133) noted  A1c 5.9% on 11/27/22  Recent Labs     08/12/24  1006 08/13/24  0603 08/14/24  0639   GLU 92 83 93   BUN 59* 53* 54*   CREATSERUM 2.69* 2.52* 2.62*   CA 9.1 9.2 9.1   * 133* 133*   K 4.2 4.7 4.6    100 99   CO2 25.0 23.0 24.0   OSMOCALC 292 290 290     NUTRITION RELATED PHYSICAL FINDINGS:  - Nutrition Focused Physical Exam (NFPE): moderate depletion body fat triceps region and moderate depletion muscle mass clavicle region unable to evaluate lower extremities at this time - largely bed-bound but will get up and move to a chair for part of the day most days  - Fluid Accumulation: none  see RN documentation for details  - Skin Integrity: Pressure Injury: Stage 2 and shear on left buttock and unstageable on right buttock, at risk, and surgical wound(s) see RN documentation for details    ANTHROPOMETRICS:  HT:  162.6 cm (5' 4\")  WT: 54.4 kg (120 lb)   BMI: Body mass index is 20.6 kg/m².  BMI CLASSIFICATION: <22 considered underweight for advanced age  IBW: 120 lbs        100% IBW  Usual Body Wt: 142 lbs per pt but unable to determine timeframe      85% UBW    WEIGHT HISTORY: Per EMR weight review, pt noted weighing 149# February 2024 and 141# August 2023  Patient Weight(s) for the past 336 hrs:   Weight   08/12/24 1511 54.4 kg (120 lb)   08/12/24 0859 54.4 kg (120 lb)     Wt Readings from Last 10 Encounters:   08/12/24 54.4 kg (120 lb)   08/01/24 50.8 kg (112 lb 1.6 oz)   07/22/24 52.3 kg (115 lb 3.2 oz)   07/09/24 56.6 kg (124 lb 12.8 oz)   06/28/24 61.7 kg (136 lb)   07/01/24 58.8 kg (129 lb 9.6 oz)   06/29/24 60.8 kg (134 lb)   06/26/24 63.5 kg (140 lb)   06/21/24 63.5 kg (140 lb)   06/13/24 62.1 kg  (137 lb)     NUTRITION DIAGNOSIS/PROBLEM:   Severe Malnutrition related to Acute - Physiological causes resulting in anorexia or diminished intake as evidenced by wt loss greater than 7.5% in 3 months, energy intake less than 50% for greater than 5 days, body fat moderate depletion, and muscle mass moderate depletion.    NUTRITION INTERVENTION:   NUTRITION PRESCRIPTION:   Estimated Nutrition needs: --dosing wt of 54.4 kg - wt taken on 8/12/2024  Calories: 5016-7891 calories/day (28-32 calories per kg Dosing wt)  Protein: 64-80 g protein/day (1.2-1.5 g protein/kg Dosing wt)    - Diet:       Procedures    Cardiac diet Cardiac; Is Patient on Accuchecks? No      - RD Malnutrition Care Plan: Encouraged increased PO intake, Encouraged small frequent meals with emphasis on high calorie/high protein, and Initiated ONS (oral nutritional supplements)  - Meals and snacks: Encouraged small frequent meals and Encouraged increased PO intake  - Medical Food Supplements-RD added Ensure Enlive (350 calories/ 20 g protein each) BID Strawberry. Rational/use of oral supplements discussed.  - Vitamin and mineral supplements: folic acid and iron/MD  - Feeding assistance: meal set up  - Nutrition education: Discussed importance of adequate energy and protein intake     - Coordination of nutrition care: collaboration with other providers  - Discharge and transfer of nutrition care to new setting or provider: monitor plans    MONITOR AND EVALUATE/NUTRITION GOALS:  - Food and Nutrient Intake:      Monitor: adequacy of PO intake and adequacy of supplement intake  - Food and Nutrient Administration:      Monitor: N/A  - Anthropometric Measurement:    Monitor weight  - Nutrition Goals:      halt wt loss, PO and supplement greater than 75% of needs, good supplement intake, labs within acceptable limits, euglycemia, support wound healing, monitor fluid status, and improved GI status    DIETITIAN FOLLOW UP: RD to follow and monitor nutrition  status    Fidelina Ambrose MS, HANS, RDN, LDN  [id number]

## 2024-08-14 NOTE — OPERATIVE REPORT
[facility]    PATIENT'S NAME: DORIS ALEMAN   ATTENDING PHYSICIAN: Veto Zhang MD   OPERATING PHYSICIAN: Eben Mckeon MD   PATIENT ACCOUNT#:   [id number]    LOCATION:  [address] [place] [address]  MEDICAL RECORD #:   [id number]       YOB: 1945  ADMISSION DATE:       08/12/2024      OPERATION DATE:  08/13/2024    OPERATIVE REPORT      PREOPERATIVE DIAGNOSIS:  Sacral buttock decubitus ulcer measuring 6 x 6 cm, stage 2; lower back cyst 3 cm.  POSTOPERATIVE DIAGNOSIS:  Sacral buttock decubitus ulcer measuring 6 x 6 cm, stage 2; lower back cyst 3 cm.  PROCEDURE:  Excision of cyst, sharp excisional debridement of buttock decubitus ulcer.    ASSISTANT:  GENE Zuniga.    ESTIMATED BLOOD LOSS:  5 mL.    COMPLICATIONS:  None.    ANESTHESIA:  General.    DISPOSITION:  To Recovery, tolerated well.    INDICATIONS:  Patient is 79, with multiple comorbid conditions.  Consent obtained.    OPERATIVE TECHNIQUE:  She was cleared by Cardiology, taken to surgery, placed in the left lateral decubitus position, prepped and draped with Betadine.  Sharp excisional debridement of all necrotic tissue was performed.  It is full-thickness skin down to the subcutaneous tissue.  This was performed using a 15 blade scalpel and curette.  Wounds are irrigated, cauterized.  A 3 cm cyst is incised and appears to be full of liquefied fat.  It is irrigated, closed with 3-0 nylon.  Sterile dressings applied.    Dictated By Eben Mckeon MD  d: 08/13/2024 11:19:15  t: 08/13/2024 21:44:36  Job [id number]  GL/    cc: MD Johnathon Conway DO

## 2024-08-14 NOTE — PROGRESS NOTES
[facility]  part of [facility]     Hospitalist Progress Note     Margaret Silverio Patient Status:  Inpatient    3/14/1945 MRN [id number]   Location [facility] POST ANESTHESIA CARE UNIT Attending Fernando Galan MD   Hosp Day # 2 PCP Johnathon Rodriguez, DO     Subjective:     Pt was seen and examined  Resting comfortably in bed  NAD  No pain currently  No acute resp distress      Objective:    Review of Systems:   ROS completed; pertinent positive and negatives stated in subjective.      Vital signs:  Temp:  [98.4 °F (36.9 °C)-99.6 °F (37.6 °C)] 98.4 °F (36.9 °C)  Pulse:  [60-77] 77  Resp:  [14-18] 18  BP: ()/(40-63) 111/54  SpO2:  [94 %-100 %] 100 %      Physical Exam:    Gen: NAD AO x3  Chest: good air entry CTABL  CVS: normal s1 and s2 RR  Abd: NABS soft NT ND  Neuro: CN 2-12 grossly intact  Skin: decub dressing CDI  Ext: no edema in bilateral LE      Diagnostic Data:    Labs:  Recent Labs   Lab 24  1006 24  0603 24  0639   WBC 1.3* 2.5* 4.2   HGB 7.4* 8.3* 7.9*   MCV 99.6 98.5 99.6   .0* 91.0* 131.0*   BAND  --  1  --        Recent Labs   Lab 24  1006 24  0603 24  0639   GLU 92 83 93   BUN 59* 53* 54*   CREATSERUM 2.69* 2.52* 2.62*   CA 9.1 9.2 9.1   * 133* 133*   K 4.2 4.7 4.6    100 99   CO2 25.0 23.0 24.0       Estimated Creatinine Clearance: 15.2 mL/min (A) (based on SCr of 2.62 mg/dL (H)).    No results for input(s): \"PTP\", \"INR\" in the last 168 hours.           Imaging: Imaging data reviewed in Epic.    Medications:    bumetanide  1 mg Intravenous Once    [START ON 8/15/2024] bumetanide  1 mg Oral BID (Diuretic)    cefTRIAXone  1 g Intravenous Q24H    sodium hypochlorite   Topical BID    amiodarone  200 mg Oral Daily    carvedilol  3.125 mg Oral BID with meals    cyclobenzaprine  10 mg Oral Nightly    fentaNYL  1 patch Transdermal Q72H    ferrous sulfate  325 mg Oral Daily with breakfast    folic acid  800 mcg  Oral Daily    gabapentin  300 mg Oral TID    isosorbide dinitrate  20 mg Oral TID (Nitrates)    midodrine  5 mg Oral BID AC    pantoprazole  40 mg Oral BID AC    sacubitril-valsartan  1 tablet Oral BID       Assessment & Plan:     Sacral decubitus ulcer  Gsx on consult  Now s/p Excision of cyst, sharp excisional debridement of buttock decubitus ulcer POD#1  PRN pain control  Continue IVF and abx  UTI  UA reviewed  Ucx pending -> e. coli  Continue IV abx  Deescalate as indicated  NICM, HFrEF  Hx of paroxysmal afib  Hx of BRANDYN occlusion  AV paced  Continue home meds  CKD 3  Monitor renal function  Avoid nephrotoxic agents  Pancytopenia  Monitor labs      Plan of care discussed with patient or family at bedside.      Supplementary Documentation:     Quality:  DVT Prophylaxis: per surgery  CODE status: DNAR/Select      Estimated date of discharge: TBD  Discharge is dependent on: clinical stability  At this point Ms. Silverio is expected to be discharge to: home    MDM: High

## 2024-08-14 NOTE — PLAN OF CARE
Patient AO x4. POD 1 of debridement to buttocks wound. V/s monitored. On O2 at 2L with NC. Does not want to lay on right side due to pain to right buttocks wound. SCDs on. Norco and Morphine PRN for pain control. Voiding with purewick. Call light within reach. Fall precautions. Plan pending.    Problem: Patient Centered Care  Goal: Patient preferences are identified and integrated in the patient's plan of care  Description: Interventions:  - What would you like us to know as we care for you? Home with   - Provide timely, complete, and accurate information to patient/family  - Incorporate patient and family knowledge, values, beliefs, and cultural backgrounds into the planning and delivery of care  - Encourage patient/family to participate in care and decision-making at the level they choose  - Honor patient and family perspectives and choices  Outcome: Progressing     Problem: Patient/Family Goals  Goal: Patient/Family Long Term Goal  Description: Patient's Long Term Goal: wound healing    Interventions:  - increase protein intake  -ambulate and reposition as tolerated  -monitor labs, v/s    - See additional Care Plan goals for specific interventions  Outcome: Progressing  Goal: Patient/Family Short Term Goal  Description: Patient's Short Term Goal:     Interventions:   -   - See additional Care Plan goals for specific interventions  Outcome: Progressing     Problem: PAIN - ADULT  Goal: Verbalizes/displays adequate comfort level or patient's stated pain goal  Description: INTERVENTIONS:  - Encourage pt to monitor pain and request assistance  - Assess pain using appropriate pain scale  - Administer analgesics based on type and severity of pain and evaluate response  - Implement non-pharmacological measures as appropriate and evaluate response  - Consider cultural and social influences on pain and pain management  - Manage/alleviate anxiety  - Utilize distraction and/or relaxation techniques  - Monitor for  opioid side effects  - Notify MD/LIP if interventions unsuccessful or patient reports new pain  - Anticipate increased pain with activity and pre-medicate as appropriate  Outcome: Progressing     Problem: RISK FOR INFECTION - ADULT  Goal: Absence of fever/infection during anticipated neutropenic period  Description: INTERVENTIONS  - Monitor WBC  - Administer growth factors as ordered  - Implement neutropenic guidelines  Outcome: Progressing     Problem: SAFETY ADULT - FALL  Goal: Free from fall injury  Description: INTERVENTIONS:  - Assess pt frequently for physical needs  - Identify cognitive and physical deficits and behaviors that affect risk of falls.  - [place] fall precautions as indicated by assessment.  - Educate pt/family on patient safety including physical limitations  - Instruct pt to call for assistance with activity based on assessment  - Modify environment to reduce risk of injury  - Provide assistive devices as appropriate  - Consider OT/PT consult to assist with strengthening/mobility  - Encourage toileting schedule  Outcome: Progressing     Problem: SKIN/TISSUE INTEGRITY - ADULT  Goal: Skin integrity remains intact  Description: INTERVENTIONS  - Assess and document risk factors for pressure ulcer development  - Assess and document skin integrity  - Monitor for areas of redness and/or skin breakdown  - Initiate interventions, skin care algorithm/standards of care as needed  Outcome: Progressing  Goal: Incision(s), wounds(s) or drain site(s) healing without S/S of infection  Description: INTERVENTIONS:  - Assess and document risk factors for pressure ulcer development  - Assess and document skin integrity  - Assess and document dressing/incision, wound bed, drain sites and surrounding tissue  - Implement wound care per orders  - Initiate isolation precautions as appropriate  - Initiate Pressure Ulcer prevention bundle as indicated  Outcome: Progressing  Goal: Oral mucous membranes remain  intact  Description: INTERVENTIONS  - Assess oral mucosa and hygiene practices  - Implement preventative oral hygiene regimen  - Implement oral medicated treatments as ordered  Outcome: Progressing

## 2024-08-15 ENCOUNTER — APPOINTMENT (OUTPATIENT)
Dept: CT IMAGING | Facility: HOSPITAL | Age: 79
DRG: 853 | End: 2024-08-15
Attending: HOSPITALIST
Payer: MEDICARE

## 2024-08-15 PROBLEM — G93.40 ENCEPHALOPATHY: Status: ACTIVE | Noted: 2024-01-01

## 2024-08-15 PROBLEM — G93.40 ENCEPHALOPATHY: Status: ACTIVE | Noted: 2024-08-15

## 2024-08-15 LAB
ALBUMIN SERPL-MCNC: 3.8 G/DL (ref 3.2–4.8)
ANION GAP SERPL CALC-SCNC: 9 MMOL/L (ref 0–18)
BASE EXCESS BLD CALC-SCNC: -3.3 MMOL/L (ref ?–2)
BASOPHILS # BLD: 0.06 X10(3) UL (ref 0–0.2)
BASOPHILS NFR BLD: 1 %
BUN BLD-MCNC: 62 MG/DL (ref 9–23)
BUN/CREAT SERPL: 23.1 (ref 10–20)
CALCIUM BLD-MCNC: 9.5 MG/DL (ref 8.7–10.4)
CHLORIDE SERPL-SCNC: 103 MMOL/L (ref 98–112)
CO2 SERPL-SCNC: 24 MMOL/L (ref 21–32)
CREAT BLD-MCNC: 2.68 MG/DL
DEPRECATED RDW RBC AUTO: 63.8 FL (ref 35.1–46.3)
EGFRCR SERPLBLD CKD-EPI 2021: 18 ML/MIN/1.73M2 (ref 60–?)
EOSINOPHIL # BLD: 0 X10(3) UL (ref 0–0.7)
EOSINOPHIL NFR BLD: 0 %
ERYTHROCYTE [DISTWIDTH] IN BLOOD BY AUTOMATED COUNT: 18.6 % (ref 11–15)
GLUCOSE BLD-MCNC: 83 MG/DL (ref 70–99)
HCO3 BLDA-SCNC: 22.3 MEQ/L (ref 21–27)
HCT VFR BLD AUTO: 24.5 %
HGB BLD-MCNC: 7.8 G/DL
LYMPHOCYTES NFR BLD: 0.48 X10(3) UL (ref 1–4)
LYMPHOCYTES NFR BLD: 7 %
MAGNESIUM SERPL-MCNC: 2.1 MG/DL (ref 1.6–2.6)
MCH RBC QN AUTO: 31.5 PG (ref 26–34)
MCHC RBC AUTO-ENTMCNC: 31.8 G/DL (ref 31–37)
MCV RBC AUTO: 98.8 FL
METAMYELOCYTES # BLD: 0.12 X10(3) UL
METAMYELOCYTES NFR BLD: 2 %
MODIFIED ALLEN TEST: POSITIVE
MONOCYTES # BLD: 0.42 X10(3) UL (ref 0.1–1)
MONOCYTES NFR BLD: 7 %
MORPHOLOGY: NORMAL
MYELOCYTES # BLD: 0.06 X10(3) UL
MYELOCYTES NFR BLD: 1 %
NEUTROPHILS # BLD AUTO: 4.17 X10 (3) UL (ref 1.5–7.7)
NEUTROPHILS NFR BLD: 80 %
NEUTS BAND NFR BLD: 1 %
NEUTS HYPERSEG # BLD: 4.86 X10(3) UL (ref 1.5–7.7)
O2 CT BLD-SCNC: 9.3 VOL% (ref 15–23)
OSMOLALITY SERPL CALC.SUM OF ELEC: 299 MOSM/KG (ref 275–295)
OXYGEN LITERS/MINUTE: 2 L/MIN
PCO2 BLDA: 34 MM HG (ref 35–45)
PH BLDA: 7.4 [PH] (ref 7.35–7.45)
PHOSPHATE SERPL-MCNC: 3.3 MG/DL (ref 2.4–5.1)
PLATELET # BLD AUTO: 207 10(3)UL (ref 150–450)
PO2 BLDA: 69 MM HG (ref 80–100)
POTASSIUM SERPL-SCNC: 4.6 MMOL/L (ref 3.5–5.1)
PUNCTURE CHARGE: YES
RBC # BLD AUTO: 2.48 X10(6)UL
SAO2 % BLDA: 96 % (ref 94–100)
SODIUM SERPL-SCNC: 136 MMOL/L (ref 136–145)
TOTAL CELLS COUNTED BLD: 100
VARIANT LYMPHS NFR BLD MANUAL: 1 %
WBC # BLD AUTO: 6 X10(3) UL (ref 4–11)

## 2024-08-15 PROCEDURE — 70450 CT HEAD/BRAIN W/O DYE: CPT | Performed by: HOSPITALIST

## 2024-08-15 PROCEDURE — 99223 1ST HOSP IP/OBS HIGH 75: CPT | Performed by: OTHER

## 2024-08-15 PROCEDURE — 99024 POSTOP FOLLOW-UP VISIT: CPT | Performed by: SURGERY

## 2024-08-15 PROCEDURE — 99233 SBSQ HOSP IP/OBS HIGH 50: CPT | Performed by: HOSPITALIST

## 2024-08-15 PROCEDURE — 95816 EEG AWAKE AND DROWSY: CPT | Performed by: OTHER

## 2024-08-15 RX ORDER — LEVETIRACETAM 500 MG/5ML
500 INJECTION, SOLUTION, CONCENTRATE INTRAVENOUS EVERY 24 HOURS
Status: DISCONTINUED | OUTPATIENT
Start: 2024-08-15 | End: 2024-08-23

## 2024-08-15 RX ORDER — ACETAMINOPHEN 10 MG/ML
1000 INJECTION, SOLUTION INTRAVENOUS EVERY 6 HOURS PRN
Status: DISCONTINUED | OUTPATIENT
Start: 2024-08-15 | End: 2024-08-23

## 2024-08-15 RX ORDER — MIDODRINE HYDROCHLORIDE 5 MG/1
5 TABLET ORAL ONCE
Status: COMPLETED | OUTPATIENT
Start: 2024-08-15 | End: 2024-08-15

## 2024-08-15 NOTE — PROGRESS NOTES
Patient is current with [facility].    Dilma Lew  Residential Liaison  [phone]

## 2024-08-15 NOTE — CONSULTS
[facility]  [address] [place] [address], [address]  [facility] [address]  [phone]            Margaret Silverio Patient Status:  Inpatient    3/14/1945 MRN [id number]   Location [facility] 4W/SW/SE Attending Fernando Galan MD   Hosp Day # 3 PCP Johnathon Rodriguez DO     Date of Admission:  2024  Date of Consult:  8/15/2024  Reason for Consultation:   Confusion, altered mental status.    History of Present Illness:   Patient is a 79 year old female who was admitted to the hospital for Pressure injury of skin of sacral region, unspecified injury stage:  Patient who presented with nonhealing sacral wound, requiring I&D, also with evidence of UTI continues with treatment of antibiotics.  History of paroxysmal A-fib, pacemaker.  Kidney function failure stage III.  Pancytopenia.  Patient continued to decline, no obvious seizure activity.  There was a history of possible seizure back in , it seems that her Keppra prescription lapsed around  and she has not been on it since then.      Past Medical History  Past Medical History:    Acute kidney insufficiency    Anemia    Arrhythmia    Back problem    CHF (congestive heart failure) (HCC)    CKD (chronic kidney disease) stage 3, GFR 30-59 ml/min (HCC)    Deep vein thrombosis (HCC)    on B.T for only a month, possibly [place]    Essential hypertension    High blood pressure    History of blood transfusion    Rheumatoid arthritis (HCC)    Seizure disorder (HCC)    Pt denied at screening call 24       Past Surgical History  Past Surgical History:   Procedure Laterality Date    Cabg      Cardiac pacemaker placement      Colonoscopy N/A 2024    Dr. Rios    Colonoscopy N/A 2024    Procedure: COLONOSCOPY;  Surgeon: Zoraida Rios MD;  Location: [facility] ENDOSCOPY    Egd N/A 2024    Dr. Rios    Fracture surgery      Other surgical history      Heart Surgery     Other surgical history      Bilateral  shoulder replacement        Family History  No family history on file.    Social History  Pediatric History   Patient Parents    Not on file     Other Topics Concern    Not on file   Social History Narrative    Not on file           Current Medications:  Current Facility-Administered Medications   Medication Dose Route Frequency    levETIRAcetam (Keppra) 500 mg/5mL injection 500 mg  500 mg Intravenous Q24H    bumetanide (Bumex) 0.25 MG/ML injection 1 mg  1 mg Intravenous Once    bumetanide (Bumex) tab 1 mg  1 mg Oral BID (Diuretic)    morphINE PF 2 MG/ML injection 1 mg  1 mg Intravenous Q3H PRN    ondansetron (Zofran) 4 MG/2ML injection 4 mg  4 mg Intravenous Q6H PRN    metoclopramide (Reglan) 5 mg/mL injection 5 mg  5 mg Intravenous Q8H PRN    cefTRIAXone (Rocephin) 1 g in sodium chloride 0.9% 100 mL IVPB-ADDV  1 g Intravenous Q24H    sodium hypochlorite (Dakin's) 0.125 % external solution   Topical BID    acetaminophen (Tylenol Extra Strength) tab 500 mg  500 mg Oral Q4H PRN    amiodarone (Pacerone) tab 200 mg  200 mg Oral Daily    carvedilol (Coreg) tab 3.125 mg  3.125 mg Oral BID with meals    cyclobenzaprine (Flexeril) tab 10 mg  10 mg Oral Nightly    fentaNYL (Duragesic) 12 MCG/HR patch 1 patch  1 patch Transdermal Q72H    ferrous sulfate DR tab 325 mg  325 mg Oral Daily with breakfast    folic acid (Folvite) tab 800 mcg  800 mcg Oral Daily    gabapentin (Neurontin) cap 300 mg  300 mg Oral TID    HYDROcodone-acetaminophen (Norco)  MG per tab 1 tablet  1 tablet Oral Q8H PRN    isosorbide dinitrate (Isordil) tab 20 mg  20 mg Oral TID (Nitrates)    midodrine (ProAmatine) tab 5 mg  5 mg Oral BID AC    pantoprazole (Protonix) DR tab 40 mg  40 mg Oral BID AC    sacubitril-valsartan (Entresto) 49-51 MG per tab 1 tablet  1 tablet Oral BID     Medications Prior to Admission   Medication Sig    bumetanide 1 MG Oral Tab Take 1 tablet (1 mg total) by mouth daily.    carvedilol 6.25 MG Oral Tab Take 0.5 tablets  (3.125 mg total) by mouth 2 (two) times daily with meals.    sennosides (SENNA-TIME) 8.6 MG Oral Tab senna 8.6 mg tablet, [RxNorm: 001533]    cyclobenzaprine 10 MG Oral Tab Take 1 tablet (10 mg total) by mouth nightly.    fentaNYL 12 MCG/HR Transdermal Patch 72 Hr Place 1 patch onto the skin every third day. (Patient taking differently: Place 1 patch onto the skin every third day. Placed 8/10 left arm)    midodrine 5 MG Oral Tab Take 1 tablet (5 mg total) by mouth 2 (two) times daily before meals.    senna-docusate 8.6-50 MG Oral Tab Take 2 tablets by mouth daily.    isosorbide dinitrate 20 MG Oral Tab Take 1 tablet (20 mg total) by mouth TID (Nitrates).    gabapentin 300 MG Oral Cap Take 1 capsule (300 mg total) by mouth 3 (three) times daily.    dapagliflozin (FARXIGA) 10 MG Oral Tab Take 1 tablet (10 mg total) by mouth daily.    spironolactone 25 MG Oral Tab     alendronate 70 MG Oral Tab Take 1 tablet (70 mg total) by mouth once a week. (Patient taking differently: Take 1 tablet (70 mg total) by mouth once a week. Every Tuesday)    methotrexate 2.5 MG Oral Tab TAKE 6 TABLETS BY MOUTH 1 TIME A WEEK    lidocaine-menthol 4-1 % External Patch Place 1 patch onto the skin daily.    PANTOPRAZOLE 40 MG Oral Tab EC TAKE 1 TABLET(40 MG) BY MOUTH TWICE DAILY BEFORE MEALS    SACUBITRIL-VALSARTAN 49-51 MG Oral Tab Take 1 tablet by mouth 2 (two) times daily.    folic acid 800 MCG Oral Tab Take 1 tablet (800 mcg total) by mouth daily.    amiodarone 200 MG Oral Tab Take 1 tablet (200 mg total) by mouth daily.    ferrous sulfate 325 (65 FE) MG Oral Tab EC Take 1 tablet (325 mg total) by mouth daily with breakfast.    HYDROcodone-acetaminophen (NORCO)  MG Oral Tab Take 1 tablet by mouth every 8 (eight) hours as needed for Pain.    polyethylene glycol, PEG 3350, 17 g Oral Powd Pack Take 17 g by mouth daily as needed.    [] predniSONE 20 MG Oral Tab Take 1 tablet (20 mg total) by mouth daily with breakfast for 5 days.     Naloxone HCl 4 MG/0.1ML Nasal Liquid 4 mg by Nasal route as needed. If patient remains unresponsive, repeat dose in other nostril 2-5 minutes after first dose.    acetaminophen (TYLENOL EXTRA STRENGTH) 500 MG Oral Tab Take 1 tablet (500 mg total) by mouth every 6 (six) hours as needed for Pain.    [] gabapentin 300 MG Oral Cap Take 1 capsule (300 mg total) by mouth 2 (two) times daily. (Patient not taking: Reported on 2024)       Allergies  Allergies   Allergen Reactions    Lisinopril Coughing       Review of Systems:   As in HPI, the rest of the 14 system review was done and was negative    Physical Exam:     Vitals:    24 1719 24 2017 08/15/24 0556 08/15/24 0813   BP: 112/57 90/50 153/62 142/50   Pulse:  60 79 72   Resp:  16  18   Temp:  99.1 °F (37.3 °C) 99.1 °F (37.3 °C) 98 °F (36.7 °C)   TempSrc:  Temporal Axillary Temporal   SpO2:  98% 96% 98%   Weight:           General: No apparent distress, well nourished, well groomed.  Head- Normocephalic, atraumatic  Eyes- No redness or swelling  ENT- Hearing intake, smell preserved, normal glutition  Neck- No masses or adenopathy  Cv: pulses were palpable and normal, no cyanosis or edema     Neurological:     Mental Status-moaning, groaning, not following any commands.  Pupillary flexes were present.  Eyes are closed.    Results:     Laboratory Data:  Lab Results   Component Value Date    WBC 6.0 08/15/2024    HGB 7.8 (L) 08/15/2024    HCT 24.5 (L) 08/15/2024    .0 08/15/2024    CREATSERUM 2.68 (H) 08/15/2024    BUN 62 (H) 08/15/2024     08/15/2024    K 4.6 08/15/2024     08/15/2024    CO2 24.0 08/15/2024    GLU 83 08/15/2024    CA 9.5 08/15/2024    ALB 3.8 08/15/2024    ALKPHO 328 (H) 2024    TP 7.7 2024    AST 23 2024    ALT 11 2024    PTT 35.2 2024    INR 1.25 (H) 2024    PTP 16.5 (H) 2024    T4F 1.4 2024    TSH 7.534 (H) 2024    LIP 32 2024    ESRML 40 (H)  07/17/2024    CRP 9.00 (H) 07/17/2024    MG 2.1 08/15/2024    PHOS 3.3 08/15/2024    B12 >2,000 (H) 07/01/2024         Imaging:    CT BRAIN OR HEAD (CPT=70450)    Result Date: 8/15/2024  CONCLUSION:  1. No acute intracranial finding. 2. Moderate changes of chronic small vessel disease in cerebral white matter. 3. Empty sella turcica is typically in asymptomatic normal variant, but can be seen in patients with idiopathic intracranial hypertension.    Dictated by (CST): Jesus Jackosn MD on 8/15/2024 at 1:40 PM     Finalized by (CST): Jesus Jackson MD on 8/15/2024 at 1:42 PM                 Impression:       Continued with altered mental status.  Possibility of toxic but encephalopathy, patient does have recent UTI and kidney failure that is getting worse.  However there could also be concern for subclinical seizures therefore Keppra will be resumed and EEG will be done.  CT of the head so far was not revealing.      Thank you for allowing me to participate in the care of your patient.    Jose Pérez MD  8/15/2024

## 2024-08-15 NOTE — PROGRESS NOTES
[facility]  [facility]     Margaret Silverio Patient Status:  Inpatient    3/14/1945 MRN [id number]   Location [facility]ESTHESIA [facility] UNIT Attending Fernando Galan MD   Hosp Day # 3 PCP Johnathon Rodriguez, DO     Subjective:     Pt was seen and examined  Pt will arouse to initial stimuli but then will not respond to any questions or commands  No acute distress noted        Objective:    Review of Systems:   ROS completed; pertinent positive and negatives stated in subjective.      Vital signs:  Temp:  [98 °F (36.7 °C)-99.6 °F (37.6 °C)] 98 °F (36.7 °C)  Pulse:  [] 72  Resp:  [16-18] 18  BP: ()/(50-62) 142/50  SpO2:  [96 %-100 %] 98 %      Physical Exam:    Gen: NAD, not responding appropriately   Chest: good air entry CTABL  CVS: normal s1 and s2 RR  Abd: NABS soft NT ND  Neuro: unable to examine  Skin: decub dressing CDI  Ext: no edema in bilateral LE      Diagnostic Data:    Labs:  Recent Labs   Lab 24  1006 24  0603 24  0639 08/15/24  0521   WBC 1.3* 2.5* 4.2 6.0   HGB 7.4* 8.3* 7.9* 7.8*   MCV 99.6 98.5 99.6 98.8   .0* 91.0* 131.0* 207.0   BAND  --  1  --  1       Recent Labs   Lab 24  0603 24  0639 08/15/24  0521   GLU 83 93 83   BUN 53* 54* 62*   CREATSERUM 2.52* 2.62* 2.68*   CA 9.2 9.1 9.5   ALB  --   --  3.8   * 133* 136   K 4.7 4.6 4.6    99 103   CO2 23.0 24.0 24.0       Estimated Creatinine Clearance: 14.9 mL/min (A) (based on SCr of 2.68 mg/dL (H)).    No results for input(s): \"PTP\", \"INR\" in the last 168 hours.           Imaging: Imaging data reviewed in Epic.    Medications:    bumetanide  1 mg Intravenous Once    bumetanide  1 mg Oral BID (Diuretic)    cefTRIAXone  1 g Intravenous Q24H    sodium hypochlorite   Topical BID    amiodarone  200 mg Oral Daily    carvedilol  3.125 mg Oral BID with meals    cyclobenzaprine  10 mg Oral Nightly    fentaNYL  1 patch  Transdermal Q72H    ferrous sulfate  325 mg Oral Daily with breakfast    folic acid  800 mcg Oral Daily    gabapentin  300 mg Oral TID    isosorbide dinitrate  20 mg Oral TID (Nitrates)    midodrine  5 mg Oral BID AC    pantoprazole  40 mg Oral BID AC    sacubitril-valsartan  1 tablet Oral BID       Assessment & Plan:     AMS  Unclear etiology, consider metabolic encephalopathy  Will consult neurology  Will obtain ct head   Will need to r/o seizures   Abg reviewed, pH wnl, O2 34, will increase O2 NC  Sacral decubitus ulcer  Gsx on consult  Now s/p Excision of cyst, sharp excisional debridement of buttock decubitus ulcer POD#2  PRN pain control  Continue IVF and abx  UTI  UA reviewed  Ucx pending -> e. coli  Continue IV abx  Deescalate as indicated  NICM, HFrEF  Hx of paroxysmal afib  Hx of BRANDYN occlusion  AV paced  Continue home meds  CKD 3  Monitor renal function  Avoid nephrotoxic agents  Pancytopenia  Monitor labs      Plan of care discussed with RN at bedside       Supplementary Documentation:     Quality:  DVT Prophylaxis: per surgery  CODE status: DNAR/Select      Estimated date of discharge: TBD  Discharge is dependent on: clinical stability  At this point Ms. Silverio is expected to be discharge to: home    MDM: High

## 2024-08-15 NOTE — PROGRESS NOTES
[facility]  part of [facility]    Progress Note    Margaret Silverio Patient Status:  Inpatient    3/14/1945 MRN [id number]   Location [facility] 4W/SW/SE Attending Fernando Galan MD   Hosp Day # 3 PCP Johnathon Rodriguez,      Assessment and Plan:     Postop day 2 after buttock and sacral debridement.  Culture growing E. coli as in her urine.  Continue daily dressing changes.  Will sign off please call if needed    Subjective:   Comfortable no complaints    Objective:   Patient Vitals for the past 24 hrs:   BP Temp Temp src Pulse Resp SpO2   08/15/24 0556 153/62 99.1 °F (37.3 °C) Axillary 79 18 96 %   24 90/50 99.1 °F (37.3 °C) Temporal 60 16 98 %   24 1719 112/57 -- -- -- -- --   24 1506 102/51 99.6 °F (37.6 °C) Oral 119 18 100 %   24 0937 111/54 -- -- -- -- --   24 0757 103/52 98.4 °F (36.9 °C) Temporal 77 18 100 %       Intake/Output                   24 0700 - 24 0659 24 0700 - 08/15/24 0659 08/15/24 0700 - 24 0659       Intake    P.O.  --  200  --    P.O. -- 200 --    Total Intake -- 200 --       Output    Urine  150  550  --    Output (mL) (External Urinary Catheter) 150 550 --    Total Output 150 550 --       Net I/O     -150 -350 --            Exam: Wounds with some drainage.    Results:     Lab Results   Component Value Date    WBC 6.0 08/15/2024    HGB 7.8 (L) 08/15/2024    HCT 24.5 (L) 08/15/2024    .0 08/15/2024    CREATSERUM 2.68 (H) 08/15/2024    BUN 62 (H) 08/15/2024     08/15/2024    K 4.6 08/15/2024     08/15/2024    CO2 24.0 08/15/2024    GLU 83 08/15/2024    CA 9.5 08/15/2024    ALB 3.8 08/15/2024    ALKPHO 328 (H) 2024    BILT 0.8 2024    TP 7.7 2024    AST 23 2024    ALT 11 2024    PTT 35.2 2024    INR 1.25 (H) 2024    T4F 1.4 2024    TSH 7.534 (H) 2024    LIP 32 2024    ESRML 40 (H) 2024    CRP 9.00 (H)  07/17/2024    MG 2.1 08/15/2024    PHOS 3.3 08/15/2024    B12 >2,000 (H) 07/01/2024       No results found.            Eben Mckeon MD  8/15/2024

## 2024-08-15 NOTE — PROCEDURES
EEG report    REFERRING PHYSICIAN: Fernando Galan MD    PCP and phone number:  Johnathon Rodriguez DO  [phone]    TECHNIQUE: 21 channels of EEG, 2 channels of EOG, and 1 channel of EKG were recorded utilizing the International 10/20 System. The recording was performed in a digitized monopolar referential format and playback was reformatted into various referential and bipolar montages utilizing appropriate filter settings. Automatic seizure and spike detection programs were utilized throughout the recording.  Video was recorded during the study    CLINICAL DATA:  Patient is sent for the evaluation of possible seizures.    MEDICATION:  Continuous Medications:    Scheduled Medications:  No current outpatient medications on file.  PRN Medications:    morphINE PF    ondansetron    metoclopramide    acetaminophen    HYDROcodone-acetaminophen    ACTIVATION:  Hyperventilation: Not done  Photic stimulation: Not done  Sleep: No clear sleep architecture was seen.    BACKGROUND    Background slow activity was seen. Throughout the record, a moderate amount of low to medium voltage, polymorphic, 4-6 Hz slow wave activity was seen diffusely over both hemispheres without localization or lateralization. No clear posterior dominant rhythm was seen.    EEG ABNORMALITY  None    IMPRESSION:    This is an abnormal EEG. Diffuse slowing was present continuously. These waveforms are not considered epileptiform in nature. This constellation of findings indicates a moderate degree of cerebral dysfunction consistent with metabolic/hypoxic encephalopathy or bilateral structural process or a medication effect. No focal, lateralized, or epileptiform features are noted.

## 2024-08-15 NOTE — PLAN OF CARE
Pt alert and oriented x4. On 2lt via nasal canula. Biventricular ICD to left upper chest. Voiding via purewick. Turn q2 or as requested by patient. Dressing changed by wound care nurse this AM. Pt is max assist. Head CT completed, Neurology consulted, EEG completed; see results. Plan is back home with palliative care once medically cleared.     Problem: Patient Centered Care  Goal: Patient preferences are identified and integrated in the patient's plan of care  Description: Interventions:  - Provide timely, complete, and accurate information to patient/family  - Incorporate patient and family knowledge, values, beliefs, and cultural backgrounds into the planning and delivery of care  - Encourage patient/family to participate in care and decision-making at the level they choose  - Honor patient and family perspectives and choices  Outcome: Progressing    Problem: PAIN - ADULT  Goal: Verbalizes/displays adequate comfort level or patient's stated pain goal  Description: INTERVENTIONS:  - Encourage pt to monitor pain and request assistance  - Assess pain using appropriate pain scale  - Administer analgesics based on type and severity of pain and evaluate response  - Implement non-pharmacological measures as appropriate and evaluate response  - Consider cultural and social influences on pain and pain management  - Manage/alleviate anxiety  - Utilize distraction and/or relaxation techniques  - Monitor for opioid side effects  - Notify MD/LIP if interventions unsuccessful or patient reports new pain  - Anticipate increased pain with activity and pre-medicate as appropriate  Outcome: Progressing     Problem: RISK FOR INFECTION - ADULT  Goal: Absence of fever/infection during anticipated neutropenic period  Description: INTERVENTIONS  - Monitor WBC  - Administer growth factors as ordered  - Implement neutropenic guidelines  Outcome: Progressing     Problem: SAFETY ADULT - FALL  Goal: Free from fall injury  Description:  INTERVENTIONS:  - Assess pt frequently for physical needs  - Identify cognitive and physical deficits and behaviors that affect risk of falls.  - [place] fall precautions as indicated by assessment.  - Educate pt/family on patient safety including physical limitations  - Instruct pt to call for assistance with activity based on assessment  - Modify environment to reduce risk of injury  - Provide assistive devices as appropriate  - Consider OT/PT consult to assist with strengthening/mobility  - Encourage toileting schedule  Outcome: Progressing     Problem: SKIN/TISSUE INTEGRITY - ADULT  Goal: Skin integrity remains intact  Description: INTERVENTIONS  - Assess and document risk factors for pressure ulcer development  - Assess and document skin integrity  - Monitor for areas of redness and/or skin breakdown  - Initiate interventions, skin care algorithm/standards of care as needed  Outcome: Progressing  Goal: Incision(s), wounds(s) or drain site(s) healing without S/S of infection  Description: INTERVENTIONS:  - Assess and document risk factors for pressure ulcer development  - Assess and document skin integrity  - Assess and document dressing/incision, wound bed, drain sites and surrounding tissue  - Implement wound care per orders  - Initiate isolation precautions as appropriate  - Initiate Pressure Ulcer prevention bundle as indicated  Outcome: Progressing  Goal: Oral mucous membranes remain intact  Description: INTERVENTIONS  - Assess oral mucosa and hygiene practices  - Implement preventative oral hygiene regimen  - Implement oral medicated treatments as ordered  Outcome: Progressing

## 2024-08-15 NOTE — PROGRESS NOTES
08/15/24 0814   Wound 08/12/24 Buttocks Right   Date First Assessed/Time First Assessed: 08/12/24 1502   Present on Original Admission: Yes  Primary Wound Type: Pressure Injury  Location: Buttocks  Wound Location Orientation: Right  Wound Description (Comments): s/p debridement 8/13   Wound Image    Site Assessment Clean;Fragile;Granulation tissue;Moist;Tan;Pink;Red;Painful   Closure Not approximated   Drainage Amount Scant   Drainage Description Serosanguineous   Treatments Dakins   Dressing 4x4s;Aquacel Foam   Dressing Changed Changed   Dressing Status Dressing Changed;Removed;Old drainage   Wound Length (cm) 6 cm   Wound Width (cm) 3 cm   Wound Surface Area (cm^2) 18 cm^2   Wound Depth (cm) 0.8 cm   Wound Volume (cm^3) 14.4 cm^3   Rochelle-wound Assessment Clean;Dry;Intact   Wound Granulation Tissue Red   Wound Bed Granulation (%) 70 %   Wound Bed Slough (%) 30 %   State of Healing Early/partial granulation   Wound Odor None   Wound 08/13/24 Sacrum   Date First Assessed/Time First Assessed: 08/13/24 1034   Present on Original Admission: Yes  Primary Wound Type: (c) Incision  Location: Sacrum   Closure Approximated;Sutures   Sutures Removed Intact No (Comment)   Drainage Amount None   Dressing Open to air   Wound 08/12/24 Buttocks Left   Date First Assessed: 08/12/24   Present on Original Admission: Yes  Primary Wound Type: Friction/Shear  Location: Buttocks  Wound Location Orientation: Left   Wound Image    Site Assessment Clean;Fragile;Excoriated;Moist;Pink   Closure Not approximated   Drainage Amount None   Treatments Cleansed;Topical (Barrier/Moisturizer/Ointment)   Dressing Aquacel Foam   Dressing Changed Changed   Dressing Status Dressing Changed;Removed   Wound Length (cm) 0.1 cm   Non-staged Wound Description Partial thickness   Rochelle-wound Assessment Clean;Dry;Intact   Wound Granulation Tissue Pink   Wound Bed Granulation (%) 100 %   State of Healing Fully granulated   Wound Follow Up   Follow up needed Yes      Pt seen s/p I and D of the right buttock w Dr. Mckeon 8/13. See above for healing wounds. Recommend to continue dakins q 12 h to the right buttock. The left buttock has some open friction and shear areas, zinc applied, air pump in use on gel mattress, pillow placed under the left hip and to elevate heels. Bedside RN present to assist with positioning.

## 2024-08-15 NOTE — PROGRESS NOTES
[facility] - CARDIOLOGY PROGRESS NOTE      Margaret Silverio Patient Status:  Inpatient    3/14/1945 MRN [id number]   Location [facility] 4W/SW/SE Attending Fernando Galan MD   Hosp Day # 3 PCP Johnathon Rodriguez,          Assessment and Plan:   S/p Excision of cyst and sharp excisional debridement decub ulcer  - Per general surgery      Chronic HFrEF, NICM, LVEF 30-35% (Improved from 15-20%) Wide-Open TR  - Appears compensated  - Tolerating GDMT for HF. ARNI, BB, Bumex. Not historically on MRNA with hx of hypotension and K on the high end of normal   - Miss morning Bumex 2/2 borderline blood pressure     PAF, Flutter, [place] DC-ICD 2023  Possible BRANDYN Occlusion w/ surgery per EP  - Controlled on BB and Amio      GEORGE on CKD IIIb  - Baseline Cr ~ 2  - Cr 2.62 today      S/p Bioprosthetic Mitral Valve 10/2022  Chronic Anemia/Thrombocytopenia/ Pancytopenia- Hgb slightly below baseline but stable   Hx Recurrent GIB   Hx of Possible TIA/Seizures 2022     PLAN:  -Patient is presently more lethargic today of unclear etiology.  Workup per primary.    Oral meds on hold until able to safely swallow.  Does not look markedly volume overloaded but will continue cardiac meds once able with adequate blood pressure.  If unable to take p.o. meds can give dose of Bumex IV x 1, BMP in a.m.  -Blood pressure presently adequate.  Will monitor.  If pressure lower can decrease Entresto or switch Coreg to metoprolol, or increase Midodrine  Subjective:   Margaret Silverio is a(n) 79 year old female is more lethargic today    Objective:   Blood pressure 142/50, pulse 72, temperature 98 °F (36.7 °C), temperature source Temporal, resp. rate 18, weight 120 lb (54.4 kg), SpO2 98%.  Intake/Output:        Last 24 hours:   Intake/Output Summary (Last 24 hours) at 8/15/2024 1326  Last data filed at 8/15/2024 0556  Gross per 24 hour   Intake 0 ml   Output 550 ml    Net -550 ml      This shift: No intake/output data recorded.    Exam  Gen: Lethargic and not following commands in no acute distress,    Psych.lethargic  HEENT: atraumatic, normal conjunctiva, moist mucosa  Neck:supple,no JVD, no bruits  Lungs: clear to ascultation, normal respiratory effort  Cardiac: regular rate and rhythm, nl S1,S2, no pathologic murmur  Abd: Abdomen soft, nontender, nondistended,  bowel sounds present  Ext:  no clubbing, no cyanosis,no edema  Neuro: Lethargic patient more lethargic today but does not look markedly volume overloaded.  Skin: no rashes or lesions        Scheduled Meds:    bumetanide  1 mg Intravenous Once    bumetanide  1 mg Oral BID (Diuretic)    cefTRIAXone  1 g Intravenous Q24H    sodium hypochlorite   Topical BID    amiodarone  200 mg Oral Daily    carvedilol  3.125 mg Oral BID with meals    cyclobenzaprine  10 mg Oral Nightly    fentaNYL  1 patch Transdermal Q72H    ferrous sulfate  325 mg Oral Daily with breakfast    folic acid  800 mcg Oral Daily    gabapentin  300 mg Oral TID    isosorbide dinitrate  20 mg Oral TID (Nitrates)    midodrine  5 mg Oral BID AC    pantoprazole  40 mg Oral BID AC    sacubitril-valsartan  1 tablet Oral BID       Results:     Lab Results   Component Value Date    WBC 6.0 08/15/2024    HGB 7.8 (L) 08/15/2024    HCT 24.5 (L) 08/15/2024    .0 08/15/2024    CREATSERUM 2.68 (H) 08/15/2024    BUN 62 (H) 08/15/2024     08/15/2024    K 4.6 08/15/2024     08/15/2024    CO2 24.0 08/15/2024    GLU 83 08/15/2024    CA 9.5 08/15/2024    ALB 3.8 08/15/2024    ALKPHO 328 (H) 06/18/2024    BILT 0.8 06/18/2024    TP 7.7 06/18/2024    AST 23 06/18/2024    ALT 11 06/18/2024    PTT 35.2 02/09/2024    INR 1.25 (H) 02/09/2024    T4F 1.4 06/18/2024    TSH 7.534 (H) 06/18/2024    LIP 32 01/13/2024    ESRML 40 (H) 07/17/2024    CRP 9.00 (H) 07/17/2024    MG 2.1 08/15/2024    PHOS 3.3 08/15/2024    B12 >2,000 (H) 07/01/2024       No results found.           Ayush Whitten MD  [facility]  8/15/2024

## 2024-08-15 NOTE — PLAN OF CARE
Problem: Patient Centered Care  Goal: Patient preferences are identified and integrated in the patient's plan of care  Description: Interventions:  - What would you like us to know as we care for you?   - Provide timely, complete, and accurate information to patient/family  - Incorporate patient and family knowledge, values, beliefs, and cultural backgrounds into the planning and delivery of care  - Encourage patient/family to participate in care and decision-making at the level they choose  - Honor patient and family perspectives and choices  Outcome: Progressing     Problem: PAIN - ADULT  Goal: Verbalizes/displays adequate comfort level or patient's stated pain goal  Description: INTERVENTIONS:  - Encourage pt to monitor pain and request assistance  - Assess pain using appropriate pain scale  - Administer analgesics based on type and severity of pain and evaluate response  - Implement non-pharmacological measures as appropriate and evaluate response  - Consider cultural and social influences on pain and pain management  - Manage/alleviate anxiety  - Utilize distraction and/or relaxation techniques  - Monitor for opioid side effects  - Notify MD/LIP if interventions unsuccessful or patient reports new pain  - Anticipate increased pain with activity and pre-medicate as appropriate  Outcome: Progressing

## 2024-08-16 LAB
ALBUMIN SERPL-MCNC: 3.1 G/DL (ref 3.2–4.8)
ANION GAP SERPL CALC-SCNC: 9 MMOL/L (ref 0–18)
ANTIBODY SCREEN: NEGATIVE
BASOPHILS # BLD AUTO: 0.01 X10(3) UL (ref 0–0.2)
BASOPHILS NFR BLD AUTO: 0.2 %
BUN BLD-MCNC: 61 MG/DL (ref 9–23)
BUN/CREAT SERPL: 25.8 (ref 10–20)
CALCIUM BLD-MCNC: 8.5 MG/DL (ref 8.7–10.4)
CHLORIDE SERPL-SCNC: 103 MMOL/L (ref 98–112)
CO2 SERPL-SCNC: 21 MMOL/L (ref 21–32)
CREAT BLD-MCNC: 2.36 MG/DL
DEPRECATED RDW RBC AUTO: 65.4 FL (ref 35.1–46.3)
EGFRCR SERPLBLD CKD-EPI 2021: 21 ML/MIN/1.73M2 (ref 60–?)
EOSINOPHIL # BLD AUTO: 0.04 X10(3) UL (ref 0–0.7)
EOSINOPHIL NFR BLD AUTO: 0.6 %
ERYTHROCYTE [DISTWIDTH] IN BLOOD BY AUTOMATED COUNT: 18.6 % (ref 11–15)
GLUCOSE BLD-MCNC: 119 MG/DL (ref 70–99)
HCT VFR BLD AUTO: 19.5 %
HGB BLD-MCNC: 6.1 G/DL
HGB BLD-MCNC: 8.9 G/DL
IMM GRANULOCYTES # BLD AUTO: 0.29 X10(3) UL (ref 0–1)
IMM GRANULOCYTES NFR BLD: 4.6 %
LYMPHOCYTES # BLD AUTO: 0.46 X10(3) UL (ref 1–4)
LYMPHOCYTES NFR BLD AUTO: 7.2 %
MAGNESIUM SERPL-MCNC: 1.8 MG/DL (ref 1.6–2.6)
MCH RBC QN AUTO: 31.3 PG (ref 26–34)
MCHC RBC AUTO-ENTMCNC: 31.3 G/DL (ref 31–37)
MCV RBC AUTO: 100 FL
MONOCYTES # BLD AUTO: 0.48 X10(3) UL (ref 0.1–1)
MONOCYTES NFR BLD AUTO: 7.5 %
NEUTROPHILS # BLD AUTO: 5.09 X10 (3) UL (ref 1.5–7.7)
NEUTROPHILS # BLD AUTO: 5.09 X10(3) UL (ref 1.5–7.7)
NEUTROPHILS NFR BLD AUTO: 79.9 %
OSMOLALITY SERPL CALC.SUM OF ELEC: 294 MOSM/KG (ref 275–295)
PHOSPHATE SERPL-MCNC: 3.3 MG/DL (ref 2.4–5.1)
PLATELET # BLD AUTO: 263 10(3)UL (ref 150–450)
POTASSIUM SERPL-SCNC: 4.4 MMOL/L (ref 3.5–5.1)
RBC # BLD AUTO: 1.95 X10(6)UL
RH BLOOD TYPE: POSITIVE
SODIUM SERPL-SCNC: 133 MMOL/L (ref 136–145)
WBC # BLD AUTO: 6.4 X10(3) UL (ref 4–11)

## 2024-08-16 PROCEDURE — 99232 SBSQ HOSP IP/OBS MODERATE 35: CPT | Performed by: OTHER

## 2024-08-16 PROCEDURE — 30233N1 TRANSFUSION OF NONAUTOLOGOUS RED BLOOD CELLS INTO PERIPHERAL VEIN, PERCUTANEOUS APPROACH: ICD-10-PCS | Performed by: HOSPITALIST

## 2024-08-16 PROCEDURE — 99233 SBSQ HOSP IP/OBS HIGH 50: CPT | Performed by: HOSPITALIST

## 2024-08-16 RX ORDER — SODIUM CHLORIDE 9 MG/ML
INJECTION, SOLUTION INTRAVENOUS ONCE
Status: COMPLETED | OUTPATIENT
Start: 2024-08-16 | End: 2024-08-16

## 2024-08-16 RX ORDER — METOPROLOL SUCCINATE 25 MG/1
25 TABLET, EXTENDED RELEASE ORAL
Status: DISCONTINUED | OUTPATIENT
Start: 2024-08-16 | End: 2024-09-10

## 2024-08-16 RX ORDER — MIDODRINE HYDROCHLORIDE 5 MG/1
5 TABLET ORAL 3 TIMES DAILY
Status: DISCONTINUED | OUTPATIENT
Start: 2024-08-16 | End: 2024-09-10

## 2024-08-16 RX ORDER — MAGNESIUM SULFATE HEPTAHYDRATE 40 MG/ML
2 INJECTION, SOLUTION INTRAVENOUS ONCE
Status: COMPLETED | OUTPATIENT
Start: 2024-08-16 | End: 2024-08-16

## 2024-08-16 NOTE — PAYOR COMM NOTE
"Oncology Rooming Note    October 29, 2020 11:33 AM   Kelley Serra is a 33 year old female who presents for:    Chief Complaint   Patient presents with     Blood Draw     VPT blood draw and vitals      Oncology Clinic Visit     MUCINOUS CYSTADENOMA, BORDERLINE MALIGNANCY      Initial Vitals: /69   Pulse 104   Temp 98.4  F (36.9  C) (Oral)   Resp 18   Wt 55.2 kg (121 lb 9.6 oz)   LMP 10/09/2020   SpO2 97%   BMI 21.54 kg/m   Estimated body mass index is 21.54 kg/m  as calculated from the following:    Height as of 10/6/20: 1.6 m (5' 3\").    Weight as of this encounter: 55.2 kg (121 lb 9.6 oz). Body surface area is 1.57 meters squared.  No Pain (0) Comment: Data Unavailable   Patient's last menstrual period was 10/09/2020.  Allergies reviewed: Yes  Medications reviewed: Yes    Medications: Medication refills not needed today.  Pharmacy name entered into [employer]: [id number] - [place] [id number] & [facility]    Clinical concerns: Patient states she's been having bleeding in between periods. Patient noticed it int he past 6 months. It goes away in a day or two, on and off.       Sharona Wilson MA            " RECONSIDERATION REQUEST     **As a reminder, Medicare Advantage plans are instructed to provide, at a minimum, the same coverage that a traditional Medicare beneficiary would receive. Beyond any doubt, a traditional Medicare recipient in this same situation would have received Medicare coverage for these inpatient medically necessary services. **        --------------  CONTINUED STAY REVIEW  8/14-8/16  Payor: [employer]IC[employer]  Subscriber #:  [id number]  Authorization Number: [id number]    Admit date: 8/12/24  Admit time:  2:41 PM    REVIEW DOCUMENTATION:    8/14 Cardiology  SSESSMENT:     S/p Excision of cyst and sharp excisional debridement decub ulcer  - Per general surgery      Chronic HFrEF, NICM, LVEF 30-35% (Improved from 15-20%) Wide-Open TR  - Appears compensated  - Tolerating GDMT for HF. ARNI, BB, Bumex. Not historically on MRNA with hx of hypotension and K on the high end of normal   - Miss morning Bumex 2/2 borderline blood pressure     PAF, Flutter, [place] DC-ICD 9/2023  Possible BRANDYN Occlusion w/ surgery per EP  - Controlled on BB and Amio      GEORGE on CKD IIIb  - Baseline Cr ~ 2  - Cr 2.62 today      S/p Bioprosthetic Mitral Valve 10/2022  Chronic Anemia/Thrombocytopenia/ Pancytopenia- Hgb slightly below baseline but stable   Hx Recurrent GIB   Hx of Possible TIA/Seizures November 2022     PLAN:  - Give 1 mg IV Bumex now, has missed PO diuretics, parameters for diuretics in place, Strict I/Os and daily weights   - Increase Bumex to 1 mg PO BID tomorrow, at last discharge she was compensated at this dose. If needed can decrease Entresto, switch Coreg to metoprolol, or increase Midodrine            8/14 IM    Temp:  [98.4 °F (36.9 °C)-99.6 °F (37.6 °C)] 98.4 °F (36.9 °C)  Pulse:  [60-77] 77  Resp:  [14-18] 18  BP: ()/(40-63) 111/54  SpO2:  [94 %-100 %] 100 %      Lab 08/12/24  1006 08/13/24  0603 08/14/24  0639   WBC 1.3* 2.5* 4.2   HGB 7.4* 8.3* 7.9*   MCV 99.6 98.5 99.6   .0*  91.0* 131.0*   BAND  --  1  --                Recent Labs   Lab 08/12/24  1006 08/13/24  0603 08/14/24  0639   GLU 92 83 93   BUN 59* 53* 54*   CREATSERUM 2.69* 2.52* 2.62*   CA 9.1 9.2 9.1   * 133* 133*   K 4.2 4.7 4.6    100 99   CO2 25.0 23.0 24.0     Scheduled Medications    bumetanide  1 mg Intravenous Once    [START ON 8/15/2024] bumetanide  1 mg Oral BID (Diuretic)    cefTRIAXone  1 g Intravenous Q24H    sodium hypochlorite   Topical BID    amiodarone  200 mg Oral Daily    carvedilol  3.125 mg Oral BID with meals    cyclobenzaprine  10 mg Oral Nightly    fentaNYL  1 patch Transdermal Q72H    ferrous sulfate  325 mg Oral Daily with breakfast    folic acid  800 mcg Oral Daily    gabapentin  300 mg Oral TID    isosorbide dinitrate  20 mg Oral TID (Nitrates)    midodrine  5 mg Oral BID AC    pantoprazole  40 mg Oral BID AC    sacubitril-valsartan  1 tablet Oral BID                  Assessment & Plan:  Sacral decubitus ulcer  Gsx on consult  Now s/p Excision of cyst, sharp excisional debridement of buttock decubitus ulcer POD#1  PRN pain control  Continue IVF and abx  UTI  UA reviewed  Ucx pending -> e. coli  Continue IV abx  Deescalate as indicated  NICM, HFrEF  Hx of paroxysmal afib  Hx of BRANDYN occlusion  AV paced  Continue home meds  CKD 3  Monitor renal function  Avoid nephrotoxic agents  Pancytopenia  Monitor labs             8/15 Surgery    Postop day 2 after buttock and sacral debridement.  Culture growing E. coli as in her urine.  Continue daily dressing changes.           8/15 IM    Pt will arouse to initial stimuli but then will not respond to any questions or commands       Temp:  [98 °F (36.7 °C)-99.6 °F (37.6 °C)] 98 °F (36.7 °C)  Pulse:  [] 72  Resp:  [16-18] 18  BP: ()/(50-62) 142/50  SpO2:  [96 %-100 %] 98 %      Lab 08/12/24  1006 08/13/24  0603 08/14/24  0639 08/15/24  0521   WBC 1.3* 2.5* 4.2 6.0   HGB 7.4* 8.3* 7.9* 7.8*   MCV 99.6 98.5 99.6 98.8   .0* 91.0* 131.0*  207.0   BAND  --  1  --  1               Recent Labs   Lab 08/13/24  0603 08/14/24  0639 08/15/24  0521   GLU 83 93 83   BUN 53* 54* 62*   CREATSERUM 2.52* 2.62* 2.68*   CA 9.2 9.1 9.5   ALB  --   --  3.8   * 133* 136   K 4.7 4.6 4.6    99 103   CO2 23.0 24.0 24.0       Scheduled Medications    bumetanide  1 mg Intravenous Once    bumetanide  1 mg Oral BID (Diuretic)    cefTRIAXone  1 g Intravenous Q24H    sodium hypochlorite   Topical BID    amiodarone  200 mg Oral Daily    carvedilol  3.125 mg Oral BID with meals    cyclobenzaprine  10 mg Oral Nightly    fentaNYL  1 patch Transdermal Q72H    ferrous sulfate  325 mg Oral Daily with breakfast    folic acid  800 mcg Oral Daily    gabapentin  300 mg Oral TID    isosorbide dinitrate  20 mg Oral TID (Nitrates)    midodrine  5 mg Oral BID AC    pantoprazole  40 mg Oral BID AC    sacubitril-valsartan  1 tablet Oral BID                  Assessment & Plan:  AMS  Unclear etiology, consider metabolic encephalopathy  Will consult neurology  Will obtain ct head   Will need to r/o seizures   Abg reviewed, pH wnl, O2 34, will increase O2 NC  Sacral decubitus ulcer  Gsx on consult  Now s/p Excision of cyst, sharp excisional debridement of buttock decubitus ulcer POD#2  PRN pain control  Continue IVF and abx  UTI  UA reviewed  Ucx pending -> e. coli  Continue IV abx  Deescalate as indicated  NICM, HFrEF  Hx of paroxysmal afib  Hx of BRANDYN occlusion  AV paced  Continue home meds  CKD 3  Monitor renal function  Avoid nephrotoxic agents  Pancytopenia  Monitor labs          8/15 Cardiology       Assessment and Plan:   S/p Excision of cyst and sharp excisional debridement decub ulcer  - Per general surgery      Chronic HFrEF, NICM, LVEF 30-35% (Improved from 15-20%) Wide-Open TR  - Appears compensated  - Tolerating GDMT for HF. ARNI, BB, Bumex. Not historically on MRNA with hx of hypotension and K on the high end of normal   - Miss morning Bumex 2/2 borderline blood pressure      PAF, Flutter, [place] DC-ICD 9/2023  Possible BRANDYN Occlusion w/ surgery per EP  - Controlled on BB and Amio      GEORGE on CKD IIIb  - Baseline Cr ~ 2  - Cr 2.62 today      S/p Bioprosthetic Mitral Valve 10/2022  Chronic Anemia/Thrombocytopenia/ Pancytopenia- Hgb slightly below baseline but stable   Hx Recurrent GIB   Hx of Possible TIA/Seizures November 2022     PLAN:  -Patient is presently more lethargic today of unclear etiology.  Workup per primary.    Oral meds on hold until able to safely swallow.  Does not look markedly volume overloaded but will continue cardiac meds once able with adequate blood pressure.  If unable to take p.o. meds can give dose of Bumex IV x 1, BMP in a.m.  -Blood pressure presently adequate.  Will monitor.  If pressure lower can decrease Entresto or switch Coreg to metoprolol, or increase Midodrine  Subjective:   Margaret Silverio is a(n) 79 year old female is more lethargic today          8/15 Neurology consult    Reason for Consultation:   Confusion, altered mental status.     History of Present Illness:   Patient is a 79 year old female who was admitted to the hospital for Pressure injury of skin of sacral region, unspecified injury stage:  Patient who presented with nonhealing sacral wound, requiring I&D, also with evidence of UTI continues with treatment of antibiotics.  History of paroxysmal A-fib, pacemaker.  Kidney function failure stage III.  Pancytopenia.  Patient continued to decline, no obvious seizure activity.  There was a history of possible seizure back in 2020, it seems that her Keppra prescription lapsed around 2023 and she has not been on it since then.      Continued with altered mental status.  Possibility of toxic but encephalopathy, patient does have recent UTI and kidney failure that is getting worse.  However there could also be concern for subclinical seizures therefore Keppra will be resumed and EEG will be done.  CT of the head so far was not  revealing.          8/16 Cardiology    Subjective:  Pt is drowsy but arousable, states she is very tired.       BP (!) 74/47 (BP Location: Right arm)   Pulse 98   Temp 99 °F (37.2 °C) (Axillary)   Resp 16   Wt 120 lb (54.4 kg)   SpO2 98%   BMI 20.60 kg/m²          Lab 08/14/24  0639 08/15/24  0521 08/16/24  0617   GLU 93 83 119*   BUN 54* 62* 61*   CREATSERUM 2.62* 2.68* 2.36*   EGFRCR 18* 18* 21*   CA 9.1 9.5 8.5*   * 136 133*   K 4.6 4.6 4.4   CL 99 103 103   CO2 24.0 24.0 21.0             Recent Labs   Lab 08/13/24  0603 08/14/24  0639 08/15/24  0521 08/16/24  0617   RBC 2.68* 2.57* 2.48* 1.95*   HGB 8.3* 7.9* 7.8* 6.1*   HCT 26.4* 25.6* 24.5* 19.5*   MCV 98.5 99.6 98.8 100.0   MCH 31.0 30.7 31.5 31.3   MCHC 31.4 30.9* 31.8 31.3   RDW 18.2* 18.7* 18.6* 18.6*   NEPRELIM 1.13*  --  4.17 5.09   WBC 2.5* 4.2 6.0 6.4   PLT 91.0* 131.0* 207.0 263.0       Physical Exam:    Gen: drowsy, oriented x 3, ill-appearing female  Heent: pupils equal, reactive. Mucous membranes moist.   Neck: no jvd  Cardiac: regular rate and rhythm, normal S1,S2, no murmur, clicks, rub or gallop  Lungs: Diminished  Abd: soft, NT/ND +bs  Ext: no edema  Skin: Warm, dry  Neuro: No focal deficits        Medications:     Scheduled Medications    sodium chloride   Intravenous Once    magnesium sulfate  2 g Intravenous Once    levETIRAcetam  500 mg Intravenous Q24H    bumetanide  1 mg Intravenous Once    bumetanide  1 mg Oral BID (Diuretic)    cefTRIAXone  1 g Intravenous Q24H    sodium hypochlorite   Topical BID    amiodarone  200 mg Oral Daily    carvedilol  3.125 mg Oral BID with meals    cyclobenzaprine  10 mg Oral Nightly    fentaNYL  1 patch Transdermal Q72H    ferrous sulfate  325 mg Oral Daily with breakfast    folic acid  800 mcg Oral Daily    gabapentin  300 mg Oral TID    isosorbide dinitrate  20 mg Oral TID (Nitrates)    midodrine  5 mg Oral BID AC    pantoprazole  40 mg Oral BID AC    sacubitril-valsartan  1 tablet Oral BID          Medication Infusions         Assessment:  Sacral decubitus ulcer s/p excision of cyst, excisional debridement 8/13  Mgmt per surgery  Chronic HFrEF, NICM, Severe Wide-Open TR  Echo 6/2024 - LVEF 30-35%, dyskinesis of anteroseptal, anterior walls, biatrial dilation, mild AVS, wide open TR, PASP 48mmHg  [facility] 2022 w/non obs disease  GDMT - doses have been held d/t lethargy, low BP  Coreg 3.125mg BID   Entresto 49-51mg po BID   Spironolactone 25mg po daily - on hold d/t GFR  Isordil 20mg po TID   Bumex 1mg po BID   Midodrine 5mg po TID  Appears compensated on exam   Altered Mental Status, Toxic Metabolic vs Infectious  Improving - drowsy but answered questions appropriately  EEG w/o seizure activity - on Keppra  Neuro following  GEORGE on CKD  BUN/Cr elevated above baseline, but downtrending  Bumex on hold   Acute on Chronic Anemia  Hgb 6.1 today - PRBC per primary  PRBC per primary  UTI  Urine culture +E Coli   On IV antibx  Paroxysmal Atrial Fibrillation s/p [place] Dual Chamber PPM/ICD - 9/2023  On amiodarone 200mg po daily  Formerly on Eliquis - no longer on AC d/t Hx severe GIB  Hx Bioprosthetic Mitral Valve Replacement  Severe RA - methotrexate at home  Hx Recurrent GIB, AVM s/p clip - 4/2023  Acute on Chronic Anemia  Hgb 6.1 today     Plan:  Hgb drop this am - PRBC transfusion today per primary.   Continue midodrine 5mg - increase to TID dosing.   With lower BP, will switch coreg to toprol. Hold for SBP <90.   Hold entresto, isordil, bumex for now - hopefully BP will improve after PRBC today  Monitor I&O, daily weight, daily BMP          8/16 Neuro    Continued with altered mental status.  Possibility of toxic metabolic and infectious encephalopathy, patient does have recent UTI and kidney failure that is getting worse.  However there could also be concern for subclinical seizures therefore Keppra was resumed and EEG was done, that did not show any obvious seizures..  CT of the head so far was not  revealing.    Medications 08/10/24 08/11/24 08/12/24 08/13/24 08/14/24 08/15/24 08/16/24                  amiodarone (Pacerone) tab 200 mg  Dose: 200 mg  Freq: Daily Route: OR  Start: 08/12/24 1945      (1945 AG)-Not Given [C]      0917 BB-Given     0946 UE-MAR Hold     1221 CW-MAR Unhold      (0930 EH)-Not Given      (0930 SS)-Not Given [C]      (0930 SS)-Not Given        bumetanide (Bumex) 0.25 MG/ML injection 1 mg  Dose: 1 mg  Freq: Once Route: IV  Start: 08/14/24 1445 End: 08/16/24 1045        (1601 EH)-Not Given       1045-D/C'd      bumetanide (Bumex) tab 1 mg  Dose: 1 mg  Freq: 2 times daily (diuretic) Route: OR  Start: 08/15/24 0900   Admin Instructions:   Hold for SBP <100         (0900 SS)-Not Given [C]     (1700 SS)-Not Given [C]           1051 TP-Held by provider [C]     1700 TP        bumetanide (Bumex) tab 1 mg  Dose: 1 mg  Freq: Daily Route: OR  Start: 08/12/24 1945 End: 08/14/24 1436      (1945 AG)-Not Given [C]      0930 BB-Hold     0946 UE-MAR Hold     1221 CW-MAR Unhold      (0930 EH)-Not Given [C]     1436-D/C'd        carvedilol (Coreg) tab 3.125 mg  Dose: 3.125 mg  Freq: 2 times daily with meals Route: OR  Start: 08/12/24 1945 End: 08/16/24 1027 2004 AG-Given      0917 BB-Given     0946 UE-MAR Hold     1221 CW-MAR Unhold     1753 BB-Given      (0800 EH)-Not Given     (1800 EH)-Not Given      (0800 SS)-Not Given [C]     (1800 SS)-Not Given      0800     1027-D/C'd      cefTRIAXone (Rocephin) 1 g in sodium chloride 0.9% 100 mL IVPB-ADDV  Dose: 1 g  Freq: Every 24 hours Route: IV  Last Dose: 1 g (08/15/24 3664)  Start: 08/13/24 1400   Admin Instructions:   Ceftriaxone must NOT be administered simultaneously with calcium containing IV solutions. Includes Y-site as well.  In patients other than neonates ceftriaxone and calcium containing products may administered sequentially, provided the line is flushed in between administrations.   Order specific questions:          1529 BB-New Bag      8829  AL-New Bag      1457 SS-New Bag      1400        cefTRIAXone (Rocephin) 2 g in sodium chloride 0.9% 100 mL IVPB-ADDV  Dose: 2 g  Freq: Once Route: IV  Last Dose: 2 g (08/12/24 1357)  Start: 08/12/24 1323 End: 08/12/24 1427   Admin Instructions:   Ceftriaxone must NOT be administered simultaneously with calcium containing IV solutions. Includes Y-site as well.  In patients other than neonates ceftriaxone and calcium containing products may administered sequentially, provided the line is flushed in between administrations.   Order specific questions:         1357 AN-New Bag            cyclobenzaprine (Flexeril) tab 10 mg  Dose: 10 mg  Freq: Nightly Route: OR  Start: 08/12/24 2100 2005 AG-Given      0946 UE-MAR Hold     1221 CW-MAR Unhold     2043 AG-Given      2031 AM-Given      (2159 AM)-Not Given      2100        fentaNYL (Duragesic) 12 MCG/HR patch 1 patch  Dose: 1 patch  Freq: Every 72 hours Route: TD  Start: 08/12/24 1945   Admin Instructions:   Scan barcode on front of package  Apply patch to chest, back, flank, or upper arm.      1945 AG/SG-Fentanyl Patch Location Verified [C]      0731 AG/BB-Fentanyl Patch Location Verified     0946 UE-MAR Hold     1221 CW-MAR Unhold     1801 BB/EH-Fentanyl Patch Applied [C]     1942 BB/AG-Fentanyl Patch Location Verified      0730 EH/AM-Fentanyl Patch Location Verified       0900        ferrous sulfate DR tab 325 mg  Dose: 325 mg  Freq: Daily with breakfast Route: OR  Start: 08/13/24 0800   Admin Instructions:   Do not crush       0917 BB-Given     0946 UE-MAR Hold     1221 CW-MAR Unhold      0931 EH-Given      (0800 SS)-Not Given [C]      (0800 SS)-Not Given        folic acid (Folvite) tab 800 mcg  Dose: 800 mcg  Freq: Daily Route: OR  Start: 08/12/24 1945      (1945 AG)-Not Given [C]      (0930 BB)-Not Given     0946 UE-MAR Hold     1221 CW-MAR Unhold      0931 EH-Given      (0930 SS)-Not Given [C]      (0930 SS)-Not Given        gabapentin (Neurontin) cap 300  mg  Dose: 300 mg  Freq: 3 times daily Route: OR  Start: 08/12/24 2100 2004 AG-Given      0917 BB-Given     0946 UE-MAR Hold     1221 CW-MAR Unhold     1600 BB-Given     2043 AG-Given      0931 EH-Given     1456 AL-Given     2031 AM-Given      (0900 SS)-Not Given [C]     (1600 SS)-Not Given [C]     (2159 AM)-Not Given      (0900 SS)-Not Given     1600     2100        isosorbide dinitrate (Isordil) tab 20 mg  Dose: 20 mg  Freq: 3 times daily @ 0800, 1300, 1800 Route: OR  Start: 08/12/24 1945   Admin Instructions:   Do not administer around the clock; allow nitrate-free interval of at least 14 hours.      2004 AG-Given      0800 BB-Hold     0946 UE-MAR Hold     1221 CW-MAR Unhold     1300 BB-Hold [C]     1753 BB-Given      0932 EH-Given     1456 AL-Given     (1800 EH)-Not Given      (0800 SS)-Not Given [C]     (1300 SS)-Not Given [C]     (1800 SS)-Not Given           1045 TP-Held by provider [C]     1300 TP     1800 TP        levETIRAcetam (Keppra) 500 mg/5mL injection 500 mg  Dose: 500 mg  Freq: Every 24 hours Route: IV  Start: 08/15/24 1415   Admin Instructions:   Administer slow IV push over 2 minutes         1457 SS-Given      1415        magnesium sulfate in sterile water for injection 2 g/50mL IVPB premix 2 g  Dose: 2 g  Freq: Once Route: IV  Start: 08/16/24 0745          0745        metoprolol succinate ER (Toprol XL) 24 hr tab 25 mg  Dose: 25 mg  Freq: Daily Beta Blocker Route: OR  Start: 08/16/24 1030   Admin Instructions:   Hold for SBP <90  Do not crush          1030        midodrine (ProAmatine) tab 5 mg  Dose: 5 mg  Freq: 3 times daily Route: OR  Start: 08/16/24 1200   Admin Instructions:   Not recommended to be administered within 4 hours of bedtime          1200     1700        midodrine (ProAmatine) tab 5 mg  Dose: 5 mg  Freq: Once Route: OR  Start: 08/15/24 2100 End: 08/15/24 2113   Admin Instructions:   Not recommended to be administered within 4 hours of bedtime         2113 AM-Given          midodrine (ProAmatine) tab 5 mg  Dose: 5 mg  Freq: 2 times daily before meals Route: OR  Start: 08/13/24 0700 End: 08/16/24 1055   Admin Instructions:   Not recommended to be administered within 4 hours of bedtime       (0700 AG)-Not Given     0946 UE-MAR Hold     1221 CW-MAR Unhold     1753 BB-Given      0457 AG-Given          (1700 EH)-Not Given      (0634 AM)-Not Given     1800 SS-Given      0557 AM-Given     1055-D/C'd      pantoprazole (Protonix) DR tab 40 mg  Dose: 40 mg  Freq: 2 times daily before meals Route: OR  Start: 08/13/24 0700   Admin Instructions:   Do not crush       (0700 AG)-Not Given     0946 UE-MAR Hold     1221 CW-MAR Unhold     1753 BB-Given      0539 AG-Given     (1700 EH)-Not Given [C]      0634 AM-Given     (1700 SS)-Not Given [C]      0557 AM-Given     1700        sacubitril-valsartan (Entresto) 49-51 MG per tab 1 tablet  Dose: 1 tablet  Freq: 2 times daily Route: OR  Start: 08/12/24 2100 2015 AG-Given      0900 BB-Hold     0946 UE-MAR Hold     1221 CW-MAR Unhold     2043 AG-Given      0931 EH-Given     (2031 AM)-Not Given [C]      (0900 SS)-Not Given [C]     (2159 AM)-Not Given           1054 TP-Held by provider [C]     2100 TP        sodium chloride 0.9 % IV bolus 250 mL  Dose: 250 mL  Freq: Once Route: IV  Last Dose: 250 mL (08/14/24 0640)  Start: 08/14/24 0530 End: 08/14/24 0840        0640 AG-New Bag [C]          sodium chloride 0.9 % IV bolus 500 mL  Dose: 500 mL  Freq: Once Route: IV  Last Dose: 500 mL (08/15/24 1949)  Start: 08/15/24 2000 End: 08/15/24 2049         1949 AM-New Bag         sodium chloride 0.9 % IV bolus 500 mL  Dose: 500 mL  Freq: Once Route: IV  Last Dose: 500 mL (08/13/24 1326)  Start: 08/13/24 1315 End: 08/13/24 1426       1326 BB-New Bag           sodium chloride 0.9% infusion  Freq: Once Route: IV  Start: 08/16/24 0715 End: 08/16/24 1100   Admin Instructions:   To standard blood administration set with integral filter. Change every 4 hours or after 2  units, whichever comes first. ALERT: NEVER mix any medication or solution with blood or blood products.  Run at KVO rate          1100 SS-New Bag        sodium hypochlorite (Dakin's) 0.125 % external solution  Freq: 2 times daily Route: TOP  Start: 08/12/24 2100   Admin Instructions:   See Wound Care Dressing Instructions   Order specific questions:         2100      (0900 BB)-Not Given     0946 UE-MAR Hold     1221 CW-MAR Unhold     (2100 AG)-Not Given [C]      (0900 EH)-Not Given     (2100 AM)-Not Given           (2100 AM)-Not Given      0900 2100        spironolactone (Aldactone) tab 25 mg  Dose: 25 mg  Freq: Daily Route: OR  Start: 08/13/24 0930 End: 08/13/24 0925   Admin Instructions:   CAUTION: HAZARDOUS DRUG       0925-D/C'd  0930 BB-Hold               sodium chloride 0.9 % irrigation  Freq: Continuous PRN  Start: 08/13/24 1039 End: 08/13/24 1221       1039 GL-New Bag [C]     1221-D/C'd            acetaminophen (Ofirmev) 10 mg/mL infusion premix 1,000 mg  Dose: 1,000 mg  Freq: Every 6 hours PRN Route: IV  PRN Reason: Pain  Last Dose: 1,000 mg (08/16/24 0410)  Start: 08/15/24 1512   Order specific questions:            1542 SS-New Bag      0410 AM-New Bag          acetaminophen (Tylenol Extra Strength) tab 500 mg  Dose: 500 mg  Freq: Every 4 hours PRN Route: OR  PRN Reason: Fever  PRN Comment: equal to or greater than 100.4  Start: 08/12/24 1451 End: 08/12/24 1858   Admin Instructions:   do not initiate oral therapy until 6-8 hours after the last IV acetaminophen dose if IV acetaminophen was used previously      1853 MO-Given     1858-D/C'd             HYDROcodone-acetaminophen (Norco)  MG per tab 1 tablet  Dose: 1 tablet  Freq: Every 8 hours PRN Route: OR  PRN Reason: moderate pain  Start: 08/12/24 1942 2005 AG-Given      0946 UE-MAR Hold     1221 CW-MAR Unhold     1528 BB-Given      2031 AM-Given             HYDROmorphone (Dilaudid) 1 MG/ML injection 0.4 mg  Dose: 0.4 mg  Freq: Every 5 min PRN  Route: IV  PRN Comment: for pain score 4-6 up to a maximum of 3 mg  Start: 08/13/24 1046 End: 08/13/24 1221   Admin Instructions:   For PACU/Phase II Use ONLY.  If no relief after reaching maximum dose (3 mg), proceed to Morphine administration       1054 PF-Given     1104 PF-Given     1221-D/C'd                         morphINE PF 2 MG/ML injection 1 mg  Dose: 1 mg  Freq: Every 3 hours PRN Route: IV  PRN Reason: mild pain  Start: 08/13/24 2117 2124 AG-Given      1604 EH-Given      0807 SS-Given     2248 AM-Given         morphINE PF 2 MG/ML injection 1 mg  Dose: 1 mg  Freq: Every 4 hours PRN Route: IV  PRN Reason: mild pain  Start: 08/13/24 0510 End: 08/13/24 2118       0531 AG-Given     0946 UE-MAR Hold     1221 CW-MAR Unhold     1748 BB-Given     2118-D/C'd             sodium chloride 0.9 % irrigation  Freq: Continuous PRN  Start: 08/13/24 1039 End: 08/13/24 1221       1039 GL-New Bag [C]     1221-D/C'd          08/16/24 1115 99 °F (37.2 °C) 60 16 114/60 100 % -- -- -- SS   08/16/24 1054 99 °F (37.2 °C) 59 16 110/56 100 % -- Nasal cannula 2 L/min SS   08/16/24 0740 99 °F (37.2 °C) 98 16 74/47 Abnormal  98 % -- Nasal cannula 2 L/min LB   08/16/24 0555 99.9 °F (37.7 °C) -- -- 92/56 -- -- -- 2 L/min AM   08/16/24 0411 101.4 °F (38.6 °C) Abnormal  -- 16 105/57 100 % -- Nasal cannula 2 L/min AM   08/15/24 2237 -- -- -- 115/60 100 % -- Nasal cannula 2 L/min AM   08/15/24 2228 -- -- -- 103/57 -- -- Nasal cannula 2 L/min DG   08/15/24 2143 99.4 °F (37.4 °C) -- -- 94/48 100 % -- Nasal cannula 2 L/min AM   08/15/24 2117 -- -- -- 99/52 100 % -- Nasal cannula 2 L/min AM   08/15/24 2025 99.5 °F (37.5 °C) 70 16 81/45 Abnormal  100 % -- Nasal cannula 2 L/min AM   08/15/24 1903 -- -- -- 75/45 Abnormal  -- -- -- -- LB   08/15/24 1709 99.9 °F (37.7 °C) 61 16 89/51 Abnormal  100 % -- Nasal cannula 2 L/min LB   08/15/24 1505 102.8 °F (39.3 °C) Abnormal  78 18 114/52 100 % -- Nasal cannula 2 L/min LB   08/15/24 0813 98 °F (36.7  °C) 72 18 142/50 98 % -- Nasal cannula 2 L/min LB   08/15/24 0556 99.1 °F (37.3 °C) 79 18 153/62 96 % -- Nasal cannula 2 L/min DM   08/14/24 2017 99.1 °F (37.3 °C) 60 16 90/50 98 % -- Nasal cannula 2 L/min DM   08/14/24 1719 -- -- -- 112/57 -- -- -- -- SSA   08/14/24 1506 99.6 °F (37.6 °C) 119 18 102/51 100 % -- Nasal cannula 2 L/min SSA   08/14/24 0937 -- -- -- 111/54 -- -- -- -- EH   08/14/24 0757 98.4 °F (36.9 °C) 77 18 103/52 100 % -- Nasal cannula 2 L/min SSA   08/14/24 0539 -- -- -- 114/50 -- -- -- -- AG   08/14/24 0428 98.9 °F (37.2 °C) 72 16 92/40 100 % -- Nasal cannula 2 L/min DM   08/14/24 0000 99.6 °F (37.6 °C) 60 16 110/63 100 % -- Nasal cannula 2 L/min AG   08/13/24 2040 99.4 °F (37.4 °C) -- 14 110/50 96 % -- Nasal cannula 2 L/min AG   08/13/24 1737 -- 63 15 108/52 95 % -- None (Room air) -- BB   08/13/24 1518 -- 60 15 116/63 94 % -- None (Room air) -- BB   08/13/24 1430 97.8 °F (36.6 °C) 60 14 86/49 Abnormal  100 % -- Nasal cannula 1 L/min SSA   08/13/24 1310 -- 59 -- 71/49 Abnormal  99 % -- Nasal cannula 1 L/min BB   08/13/24 1230 98.9 °F (37.2 °C) 60 14 75/40 Abnormal  94 % -- Nasal cannula 3 L/min BB   08/13/24 1214 -- 59 11 108/60 93 % -- Nasal cannula 3 L/min PF   08/13/24 1204 -- 60 12 107/60 93 % -- Nasal cannula 2 L/min PF   08/13/24 1200 -- 60 11 -- 93 % -- -- -- PF   08/13/24 1134 -- 60 14 118/64 94 % -- Nasal cannula 3 L/min PF   08/13/24 1124 -- 60 11 110/44 92 % -- Nasal cannula 3 L/min PF   08/13/24 1114 98.8 °F (37.1 °C) 61 11 127/64 92 % -- Nasal cannula 3 L/min PF   08/13/24 1104 -- 60 10 122/44 92 % -- None (Room air) -- PF   08/13/24 1054 98.2 °F (36.8 °C) 96 15 132/109 Abnormal  92 % -- None (Room air) -- PF   08/13/24 0949 -- 63 16 119/41 94 % -- None (Room air) -- SP   08/13/24 0912 98.9 °F (37.2 °C) 108 18 115/49 99 % -- None (Room air) -- SSA   08/13/24 0518 97.3 °F (36.3 °C) 60 16 99/46 95 % -- None (Room air) -- AG   08/12/24 1958 98.2 °F (36.8 °C) 60 18 104/53 96 % -- None  (Room air) -- AG   08/12/24 1511 -- -- -- -- -- 120 lb (54.4 kg) -- -- MO   08/12/24 1507 99.4 °F (37.4 °C) 59 18 100/38 96 % -- None (Room air) -- SSA   08/12/24 1401 -- 62 18 104/45 96 % -- None (Room air) -- AMA   08/12/24 0859 98 °F (36.7 °C) 63 18 110/59 100 % 120 lb (54.4 kg) None (Room air) -- JS       Blood Transfusion Record       Product Unit Status Volume Start End            Transfuse RBC       24  [id number]  [id number] Transfusing  08/16/24 1100

## 2024-08-16 NOTE — PLAN OF CARE
Patient Alert and Oriented x3-4. On 2L O2. Dressing changed. Episodes of Hypotension. Notified MD. Fall precautions in place. Call light and personal belongings within arms reach.  Problem: Patient Centered Care  Goal: Patient preferences are identified and integrated in the patient's plan of care  Description: Interventions:  - What would you like us to know as we care for you?   - Provide timely, complete, and accurate information to patient/family  - Incorporate patient and family knowledge, values, beliefs, and cultural backgrounds into the planning and delivery of care  - Encourage patient/family to participate in care and decision-making at the level they choose  - Honor patient and family perspectives and choices  Outcome: Progressing     Problem: PAIN - ADULT  Goal: Verbalizes/displays adequate comfort level or patient's stated pain goal  Description: INTERVENTIONS:  - Encourage pt to monitor pain and request assistance  - Assess pain using appropriate pain scale  - Administer analgesics based on type and severity of pain and evaluate response  - Implement non-pharmacological measures as appropriate and evaluate response  - Consider cultural and social influences on pain and pain management  - Manage/alleviate anxiety  - Utilize distraction and/or relaxation techniques  - Monitor for opioid side effects  - Notify MD/LIP if interventions unsuccessful or patient reports new pain  - Anticipate increased pain with activity and pre-medicate as appropriate  Outcome: Progressing     Problem: RISK FOR INFECTION - ADULT  Goal: Absence of fever/infection during anticipated neutropenic period  Description: INTERVENTIONS  - Monitor WBC  - Administer growth factors as ordered  - Implement neutropenic guidelines  Outcome: Progressing     Problem: SAFETY ADULT - FALL  Goal: Free from fall injury  Description: INTERVENTIONS:  - Assess pt frequently for physical needs  - Identify cognitive and physical deficits and behaviors  that affect risk of falls.  - [place] fall precautions as indicated by assessment.  - Educate pt/family on patient safety including physical limitations  - Instruct pt to call for assistance with activity based on assessment  - Modify environment to reduce risk of injury  - Provide assistive devices as appropriate  - Consider OT/PT consult to assist with strengthening/mobility  - Encourage toileting schedule  Outcome: Progressing

## 2024-08-16 NOTE — PROGRESS NOTES
Progress Note  Margaret Silverio Patient Status:  Inpatient    3/14/1945 MRN [id number]   Location [facility] 4W/SW/SE Attending Fernando Galan MD   Hosp Day # 4 PCP Johnathon Rodriguez, DO     Subjective:  Pt is drowsy but arousable, states she is very tired. Denies CP, SOB or orthopnea. Able to answer all orientation questions appropriately.     Objective:  BP (!) 74/47 (BP Location: Right arm)   Pulse 98   Temp 99 °F (37.2 °C) (Axillary)   Resp 16   Wt 120 lb (54.4 kg)   SpO2 98%   BMI 20.60 kg/m²     Telemetry: not on tele     Intake/Output:    Intake/Output Summary (Last 24 hours) at 2024 1004  Last data filed at 2024 0100  Gross per 24 hour   Intake 50 ml   Output 400 ml   Net -350 ml       Last 3 Weights   24 1511 120 lb (54.4 kg)   24 0859 120 lb (54.4 kg)   24 1100 112 lb 1.6 oz (50.8 kg)   24 0355 115 lb 3.2 oz (52.3 kg)   24 0620 109 lb 6.4 oz (49.6 kg)   24 0450 112 lb 11.2 oz (51.1 kg)   24 0458 113 lb 9.6 oz (51.5 kg)   24 0508 117 lb 14.4 oz (53.5 kg)   24 0614 123 lb 12.8 oz (56.2 kg)   07/15/24 1830 124 lb 1.6 oz (56.3 kg)       Labs:  Recent Labs   Lab 24  0639 08/15/24  0521 24  0617   GLU 93 83 119*   BUN 54* 62* 61*   CREATSERUM 2.62* 2.68* 2.36*   EGFRCR 18* 18* 21*   CA 9.1 9.5 8.5*   * 136 133*   K 4.6 4.6 4.4   CL 99 103 103   CO2 24.0 24.0 21.0     Recent Labs   Lab 24  0603 24  0639 08/15/24  0521 24  0617   RBC 2.68* 2.57* 2.48* 1.95*   HGB 8.3* 7.9* 7.8* 6.1*   HCT 26.4* 25.6* 24.5* 19.5*   MCV 98.5 99.6 98.8 100.0   MCH 31.0 30.7 31.5 31.3   MCHC 31.4 30.9* 31.8 31.3   RDW 18.2* 18.7* 18.6* 18.6*   NEPRELIM 1.13*  --  4.17 5.09   WBC 2.5* 4.2 6.0 6.4   PLT 91.0* 131.0* 207.0 263.0         No results for input(s): \"TROP\", \"TROPHS\", \"CK\" in the last 168 hours.    Diagnostics:  CT BRAIN OR HEAD (CPT=70450)    Result Date: 8/15/2024  CONCLUSION:  1. No acute  intracranial finding. 2. Moderate changes of chronic small vessel disease in cerebral white matter. 3. Empty sella turcica is typically in asymptomatic normal variant, but can be seen in patients with idiopathic intracranial hypertension.    Dictated by (CST): Jesus Jackson MD on 8/15/2024 at 1:40 PM     Finalized by (CST): Jesus Jackson MD on 8/15/2024 at 1:42 PM          ROS    Physical Exam:    Gen: drowsy, oriented x 3, ill-appearing female  Heent: pupils equal, reactive. Mucous membranes moist.   Neck: no jvd  Cardiac: regular rate and rhythm, normal S1,S2, no murmur, clicks, rub or gallop  Lungs: Diminished  Abd: soft, NT/ND +bs  Ext: no edema  Skin: Warm, dry  Neuro: No focal deficits      Medications:     sodium chloride   Intravenous Once    magnesium sulfate  2 g Intravenous Once    levETIRAcetam  500 mg Intravenous Q24H    bumetanide  1 mg Intravenous Once    bumetanide  1 mg Oral BID (Diuretic)    cefTRIAXone  1 g Intravenous Q24H    sodium hypochlorite   Topical BID    amiodarone  200 mg Oral Daily    carvedilol  3.125 mg Oral BID with meals    cyclobenzaprine  10 mg Oral Nightly    fentaNYL  1 patch Transdermal Q72H    ferrous sulfate  325 mg Oral Daily with breakfast    folic acid  800 mcg Oral Daily    gabapentin  300 mg Oral TID    isosorbide dinitrate  20 mg Oral TID (Nitrates)    midodrine  5 mg Oral BID AC    pantoprazole  40 mg Oral BID AC    sacubitril-valsartan  1 tablet Oral BID       Assessment:  Sacral decubitus ulcer s/p excision of cyst, excisional debridement 8/13  Mgmt per surgery  Chronic HFrEF, NICM, Severe Wide-Open TR  Echo 6/2024 - LVEF 30-35%, dyskinesis of anteroseptal, anterior walls, biatrial dilation, mild AVS, wide open TR, PASP 48mmHg  [facility] 2022 w/non obs disease  GDMT - doses have been held d/t lethargy, low BP  Coreg 3.125mg BID   Entresto 49-51mg po BID   Spironolactone 25mg po daily - on hold d/t GFR  Isordil 20mg po TID   Bumex 1mg po BID   Midodrine 5mg po  TID  Appears compensated on exam   Altered Mental Status, Toxic Metabolic vs Infectious  Improving - drowsy but answered questions appropriately  EEG w/o seizure activity - on Keppra  Neuro following  GEORGE on CKD  BUN/Cr elevated above baseline, but downtrending  Bumex on hold   Acute on Chronic Anemia  Hgb 6.1 today - PRBC per primary  PRBC per primary  UTI  Urine culture +E Coli   On IV antibx  Paroxysmal Atrial Fibrillation s/p [place] Dual Chamber PPM/ICD - 9/2023  On amiodarone 200mg po daily  Formerly on Eliquis - no longer on AC d/t Hx severe GIB  Hx Bioprosthetic Mitral Valve Replacement  Severe RA - methotrexate at home  Hx Recurrent GIB, AVM s/p clip - 4/2023  Acute on Chronic Anemia  Hgb 6.1 today    Plan:  Hgb drop this am - PRBC transfusion today per primary.   Continue midodrine 5mg - increase to TID dosing.   With lower BP, will switch coreg to toprol. Hold for SBP <90.   Hold entresto, isordil, bumex for now - hopefully BP will improve after PRBC today  Monitor I&O, daily weight, daily BMP    Plan of care discussed with patient, RN.    Carole Shaw, APRN  8/16/2024  10:04 AM  [phone] [facility]  [phone] [facility]        L3  Seen and examined bedside. Agree with the present management, will follow with you.    Mental status change further management as per neurology.  EEG does not appear to be revealing appears to be possibly metabolic and infectious encephalopathy  Continue midodrine 5mg - increase to TID dosing.  Low BP, switch coreg to toprol.   Hold entresto, isordil, bumex for now  Thank you for allowing me to participate in the care of your patient, feel free to contact me if you have any questions.    Tyler Vaz MD  [facility] [place]  Interventional Cardiology.  [phone]

## 2024-08-16 NOTE — PROGRESS NOTES
[facility]  [address] [place] [address], [address]  [facility] [address]  [phone]            Margaret Silverio Patient Status:  Inpatient    3/14/1945 MRN [id number]   Location [facility] 4W/SW/SE Attending Fernando Galan MD   Hosp Day # 4 PCP Johnathon Rodriguez, DO     Subjective:  Margaret Silverio is a(n) 79 year old female.    Hospital course to date:     Patient who presented with nonhealing sacral wound, requiring I&D, also with evidence of UTI continues with treatment of antibiotics.  History of paroxysmal A-fib, pacemaker.  Kidney function failure stage III.  Pancytopenia.  Patient continued to decline, no obvious seizure activity.  There was a history of possible seizure back in , it seems that her Keppra prescription lapsed around  and she has not been on it since then.  EEG did not show any epileptiform activity.    Current Facility-Administered Medications   Medication Dose Route Frequency    sodium chloride 0.9% infusion   Intravenous Once    magnesium sulfate in sterile water for injection 2 g/50mL IVPB premix 2 g  2 g Intravenous Once    metoprolol succinate ER (Toprol XL) 24 hr tab 25 mg  25 mg Oral Daily Beta Blocker    levETIRAcetam (Keppra) 500 mg/5mL injection 500 mg  500 mg Intravenous Q24H    acetaminophen (Ofirmev) 10 mg/mL infusion premix 1,000 mg  1,000 mg Intravenous Q6H PRN    bumetanide (Bumex) 0.25 MG/ML injection 1 mg  1 mg Intravenous Once    bumetanide (Bumex) tab 1 mg  1 mg Oral BID (Diuretic)    morphINE PF 2 MG/ML injection 1 mg  1 mg Intravenous Q3H PRN    ondansetron (Zofran) 4 MG/2ML injection 4 mg  4 mg Intravenous Q6H PRN    metoclopramide (Reglan) 5 mg/mL injection 5 mg  5 mg Intravenous Q8H PRN    cefTRIAXone (Rocephin) 1 g in sodium chloride 0.9% 100 mL IVPB-ADDV  1 g Intravenous Q24H    sodium hypochlorite (Dakin's) 0.125 % external solution   Topical BID    acetaminophen (Tylenol Extra Strength) tab 500 mg  500 mg  Oral Q4H PRN    amiodarone (Pacerone) tab 200 mg  200 mg Oral Daily    cyclobenzaprine (Flexeril) tab 10 mg  10 mg Oral Nightly    fentaNYL (Duragesic) 12 MCG/HR patch 1 patch  1 patch Transdermal Q72H    ferrous sulfate DR tab 325 mg  325 mg Oral Daily with breakfast    folic acid (Folvite) tab 800 mcg  800 mcg Oral Daily    gabapentin (Neurontin) cap 300 mg  300 mg Oral TID    HYDROcodone-acetaminophen (Norco)  MG per tab 1 tablet  1 tablet Oral Q8H PRN    isosorbide dinitrate (Isordil) tab 20 mg  20 mg Oral TID (Nitrates)    midodrine (ProAmatine) tab 5 mg  5 mg Oral BID AC    pantoprazole (Protonix) DR tab 40 mg  40 mg Oral BID AC    sacubitril-valsartan (Entresto) 49-51 MG per tab 1 tablet  1 tablet Oral BID       Objective:  Blood pressure (!) 74/47, pulse 98, temperature 99 °F (37.2 °C), temperature source Axillary, resp. rate 16, weight 120 lb (54.4 kg), SpO2 98%.    Physical Exam:  Vitals:    08/15/24 2237 08/16/24 0411 08/16/24 0555 08/16/24 0740   BP: 115/60 105/57 92/56 (!) 74/47   Pulse:    98   Resp:  16  16   Temp:  (!) 101.4 °F (38.6 °C) 99.9 °F (37.7 °C) 99 °F (37.2 °C)   TempSrc:  Axillary Axillary Axillary   SpO2: 100% 100%  98%   Weight:           General: No apparent distress, well nourished, well groomed.  Head- Normocephalic, atraumatic  Eyes- No redness or swelling  Neck- No masses or adenopathy  CV: pulses were palpable and normal, no cyanosis or edema     Neurological:         Lab Results   Component Value Date    WBC 6.4 08/16/2024    HGB 6.1 (LL) 08/16/2024    HCT 19.5 (L) 08/16/2024    .0 08/16/2024    CREATSERUM 2.36 (H) 08/16/2024    BUN 61 (H) 08/16/2024     (L) 08/16/2024    K 4.4 08/16/2024     08/16/2024    CO2 21.0 08/16/2024     (H) 08/16/2024    CA 8.5 (L) 08/16/2024    ALB 3.1 (L) 08/16/2024    ALKPHO 328 (H) 06/18/2024    BILT 0.8 06/18/2024    TP 7.7 06/18/2024    AST 23 06/18/2024    ALT 11 06/18/2024    PTT 35.2 02/09/2024    INR 1.25 (H)  02/09/2024    T4F 1.4 06/18/2024    TSH 7.534 (H) 06/18/2024    LIP 32 01/13/2024    ESRML 40 (H) 07/17/2024    CRP 9.00 (H) 07/17/2024    MG 1.8 08/16/2024    PHOS 3.3 08/16/2024    B12 >2,000 (H) 07/01/2024       CT BRAIN OR HEAD (CPT=70450)    Result Date: 8/15/2024  CONCLUSION:  1. No acute intracranial finding. 2. Moderate changes of chronic small vessel disease in cerebral white matter. 3. Empty sella turcica is typically in asymptomatic normal variant, but can be seen in patients with idiopathic intracranial hypertension.    Dictated by (CST): Jesus Jackson MD on 8/15/2024 at 1:40 PM     Finalized by (CST): Jesus Jackson MD on 8/15/2024 at 1:42 PM                 Assessment:  Patient Active Problem List   Diagnosis    Altered mental status    Altered mental status, unspecified altered mental status type    Seizure (HCC)    Lactic acidosis    Leukocytosis    Acute GI bleeding    Thrombocytopenia (HCC)    Metabolic acidosis    Atrial fibrillation, chronic (HCC)    Sepsis due to urinary tract infection (HCC)    Sepsis due to Escherichia coli (HCC)    ABLA (acute blood loss anemia)    Melena    Acute chest pain    Pleural effusion    ACC/AHA stage B systolic heart failure (HCC)    Congestive heart failure (HCC)    Coronary arteriosclerosis    Essential (primary) hypertension    Mitral valve stenosis    Rheumatoid arthritis (HCC)    Stage 3 chronic kidney disease (HCC)    Atrial flutter (HCC)    Acute on chronic HFrEF (heart failure with reduced ejection fraction) (HCC)    Dyspnea, unspecified type    Anticoagulated    Coagulopathy (HCC)    SIRS (systemic inflammatory response syndrome) (HCC)    Anemia due to stage 3 chronic kidney disease (HCC)    Anemia due to GI blood loss    Anemia of chronic disease    Lymphopenia    Pancytopenia (HCC)    Acute deep vein thrombosis (DVT) of left upper extremity (HCC)    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HCC)    Cardiogenic shock (HCC)    HFrEF (heart failure with reduced ejection  fraction) ([facility])    Anemia    Acute kidney injury ([facility])    Iron deficiency anemia, unspecified iron deficiency anemia type    Weakness generalized    Acute kidney insufficiency    Hyperkalemia    Cervical pain    Cervical radiculopathy    Pleural effusion, bilateral    Cognitive communication deficit    Difficulty in walking, not elsewhere classified    Dysphagia, oral phase    Gastro-esophageal reflux disease without esophagitis    Cardiomyopathy, unspecified ([facility])    Major depressive disorder, single episode, unspecified    Severe tricuspid regurgitation    S/P MVR (mitral valve replacement)    PAF (paroxysmal atrial fibrillation) ([facility])    ICD (implantable cardioverter-defibrillator), dual, in situ    CKD (chronic kidney disease) stage 4, GFR 15-29 ml/min ([facility])    Pressure injury of skin of sacral region, unspecified injury stage    Urinary tract infection without hematuria, site unspecified    Anemia, unspecified type    Encephalopathy     Continued with altered mental status.  Possibility of toxic metabolic and infectious encephalopathy, patient does have recent UTI and kidney failure that is getting worse.  However there could also be concern for subclinical seizures therefore Keppra was resumed and EEG was done, that did not show any obvious seizures..  CT of the head so far was not revealing.    Patient was more awake, she knew she was in the hospital, she was able to follow commands.  That was improvement from the first day with admission.      Jose Pérez MD  8/16/2024  10:32 AM

## 2024-08-17 LAB
ANION GAP SERPL CALC-SCNC: 9 MMOL/L (ref 0–18)
BLOOD TYPE BARCODE: 6200
BUN BLD-MCNC: 62 MG/DL (ref 9–23)
BUN/CREAT SERPL: 28.2 (ref 10–20)
CALCIUM BLD-MCNC: 9.5 MG/DL (ref 8.7–10.4)
CHLORIDE SERPL-SCNC: 106 MMOL/L (ref 98–112)
CO2 SERPL-SCNC: 21 MMOL/L (ref 21–32)
CREAT BLD-MCNC: 2.2 MG/DL
DEPRECATED RDW RBC AUTO: 67.1 FL (ref 35.1–46.3)
EGFRCR SERPLBLD CKD-EPI 2021: 22 ML/MIN/1.73M2 (ref 60–?)
ERYTHROCYTE [DISTWIDTH] IN BLOOD BY AUTOMATED COUNT: 19.7 % (ref 11–15)
GLUCOSE BLD-MCNC: 93 MG/DL (ref 70–99)
HCT VFR BLD AUTO: 25.9 %
HGB BLD-MCNC: 8 G/DL
MAGNESIUM SERPL-MCNC: 2.8 MG/DL (ref 1.6–2.6)
MCH RBC QN AUTO: 29.6 PG (ref 26–34)
MCHC RBC AUTO-ENTMCNC: 30.9 G/DL (ref 31–37)
MCV RBC AUTO: 95.9 FL
OSMOLALITY SERPL CALC.SUM OF ELEC: 299 MOSM/KG (ref 275–295)
PLATELET # BLD AUTO: 394 10(3)UL (ref 150–450)
POTASSIUM SERPL-SCNC: 4.8 MMOL/L (ref 3.5–5.1)
RBC # BLD AUTO: 2.7 X10(6)UL
SODIUM SERPL-SCNC: 136 MMOL/L (ref 136–145)
UNIT VOLUME: 350 ML
WBC # BLD AUTO: 7.5 X10(3) UL (ref 4–11)

## 2024-08-17 PROCEDURE — 99233 SBSQ HOSP IP/OBS HIGH 50: CPT | Performed by: HOSPITALIST

## 2024-08-17 RX ORDER — BUMETANIDE 1 MG/1
1 TABLET ORAL DAILY
Status: DISCONTINUED | OUTPATIENT
Start: 2024-08-17 | End: 2024-09-04

## 2024-08-17 NOTE — PROGRESS NOTES
[facility]     Margaret Silverio Patient Status:  Inpatient    3/14/1945 MRN [id number]   Location [facility] Attending Fernando Galan MD   Hosp Day # 5 PCP Johnathon Rodriguez, DO     Subjective:     Pt was seen and examined  Drowsy today, not following commands  No acute distress noted        Objective:    Review of Systems:   ROS completed; pertinent positive and negatives stated in subjective.      Vital signs:  Temp:  [97.4 °F (36.3 °C)-98.1 °F (36.7 °C)] 98.1 °F (36.7 °C)  Pulse:  [59-60] 60  Resp:  [14-16] 14  BP: ()/(53-67) 117/67  SpO2:  [97 %-100 %] 98 %      Physical Exam:    Gen: drowsy  Chest: good air entry CTABL  CVS: normal s1 and s2 RR  Abd: NABS soft NT ND  Neuro: non-focal, drowsy  Skin: decub dressing CDI  Ext: no edema in bilateral LE      Diagnostic Data:    Labs:  Recent Labs   Lab 24  0603 24  0639 08/15/24  0521 24  0617 24  1517 24  0654   WBC 2.5* 4.2 6.0 6.4  --  7.5   HGB 8.3* 7.9* 7.8* 6.1* 8.9* 8.0*   MCV 98.5 99.6 98.8 100.0  --  95.9   PLT 91.0* 131.0* 207.0 263.0  --  394.0   BAND 1  --  1  --   --   --        Recent Labs   Lab 08/15/24  0521 24  0617 24  0654   GLU 83 119* 93   BUN 62* 61* 62*   CREATSERUM 2.68* 2.36* 2.20*   CA 9.5 8.5* 9.5   ALB 3.8 3.1*  --     133* 136   K 4.6 4.4 4.8    103 106   CO2 24.0 21.0 21.0       Estimated Creatinine Clearance: 18.1 mL/min (A) (based on SCr of 2.2 mg/dL (H)).    No results for input(s): \"PTP\", \"INR\" in the last 168 hours.           Imaging: Imaging data reviewed in Epic.    Medications:    sacubitril-valsartan  1 tablet Oral BID    bumetanide  1 mg Oral Daily    metoprolol succinate  25 mg Oral Daily Beta Blocker    midodrine  5 mg Oral TID    levETIRAcetam  500 mg Intravenous Q24H    cefTRIAXone  1 g Intravenous Q24H    sodium hypochlorite   Topical BID     amiodarone  200 mg Oral Daily    cyclobenzaprine  10 mg Oral Nightly    fentaNYL  1 patch Transdermal Q72H    ferrous sulfate  325 mg Oral Daily with breakfast    folic acid  800 mcg Oral Daily    gabapentin  300 mg Oral TID    [Held by provider] isosorbide dinitrate  20 mg Oral TID (Nitrates)    pantoprazole  40 mg Oral BID AC       Assessment & Plan:     AMS -> intermittent   Unclear etiology, consider metabolic encephalopathy  Neurology following, EEG WNL  Keppra resumed  CT head non-acute  Abg reviewed  Sacral decubitus ulcer  Gsx on consult  Now s/p Excision of cyst, sharp excisional debridement of buttock decubitus ulcer POD#4  PRN pain control  Continue abx  UTI  UA reviewed  Ucx pending -> e. coli  Continue IV abx  Deescalate as indicated  NICM, HFrEF  Hx of paroxysmal afib  Hx of BRANDYN occlusion  AV paced  Continue home meds  CKD 3  Monitor renal function  Avoid nephrotoxic agents  Pancytopenia  Hgb stable today  Cont to monitor       Plan of care discussed with RN at bedside       Supplementary Documentation:     Quality:  DVT Prophylaxis: per surgery  CODE status: DNAR/Select      Estimated date of discharge: TBD  Discharge is dependent on: clinical stability  At this point Ms. Silverio is expected to be discharge to: home    MDM: High

## 2024-08-17 NOTE — PROGRESS NOTES
[facility]  part of [place] [facility]ist Progress Note     Margaret Silverio Patient Status:  Inpatient    3/14/1945 MRN [id number]   Location [facility] POST ANESTHESIA CARE UNIT Attending Fernando Galan MD   Hosp Day # 4 PCP Johnathon Rodriguez, DO     Subjective:     Pt was seen and examined  Drowsy but appropriate today  Following commands  No acute distress noted        Objective:    Review of Systems:   ROS completed; pertinent positive and negatives stated in subjective.      Vital signs:  Temp:  [97.6 °F (36.4 °C)-101.4 °F (38.6 °C)] 97.6 °F (36.4 °C)  Pulse:  [59-98] 60  Resp:  [14-16] 16  BP: ()/(47-73) 101/58  SpO2:  [96 %-100 %] 98 %      Physical Exam:    Gen: NAD  Chest: good air entry CTABL  CVS: normal s1 and s2 RR  Abd: NABS soft NT ND  Neuro: non-focal, drowsy  Skin: decub dressing CDI  Ext: no edema in bilateral LE      Diagnostic Data:    Labs:  Recent Labs   Lab 24  1006 24  0603 24  0639 08/15/24  0521 24  0617 24  1517   WBC 1.3* 2.5* 4.2 6.0 6.4  --    HGB 7.4* 8.3* 7.9* 7.8* 6.1* 8.9*   MCV 99.6 98.5 99.6 98.8 100.0  --    .0* 91.0* 131.0* 207.0 263.0  --    BAND  --  1  --  1  --   --        Recent Labs   Lab 24  0639 08/15/24  0521 24  0617   GLU 93 83 119*   BUN 54* 62* 61*   CREATSERUM 2.62* 2.68* 2.36*   CA 9.1 9.5 8.5*   ALB  --  3.8 3.1*   * 136 133*   K 4.6 4.6 4.4   CL 99 103 103   CO2 24.0 24.0 21.0       Estimated Creatinine Clearance: 16.9 mL/min (A) (based on SCr of 2.36 mg/dL (H)).    No results for input(s): \"PTP\", \"INR\" in the last 168 hours.           Imaging: Imaging data reviewed in Epic.    Medications:    metoprolol succinate  25 mg Oral Daily Beta Blocker    midodrine  5 mg Oral TID    levETIRAcetam  500 mg Intravenous Q24H    [Held by provider] bumetanide  1 mg Oral BID (Diuretic)    cefTRIAXone  1 g Intravenous Q24H    sodium hypochlorite   Topical BID    amiodarone   200 mg Oral Daily    cyclobenzaprine  10 mg Oral Nightly    fentaNYL  1 patch Transdermal Q72H    ferrous sulfate  325 mg Oral Daily with breakfast    folic acid  800 mcg Oral Daily    gabapentin  300 mg Oral TID    [Held by provider] isosorbide dinitrate  20 mg Oral TID (Nitrates)    pantoprazole  40 mg Oral BID AC    [Held by provider] sacubitril-valsartan  1 tablet Oral BID       Assessment & Plan:     AMS -> IMPROVED  Unclear etiology, consider metabolic encephalopathy  Neurology following, EEG WNL  Keppra resumed  CT head non-acute  Abg reviewed  Sacral decubitus ulcer  Gsx on consult  Now s/p Excision of cyst, sharp excisional debridement of buttock decubitus ulcer POD#3  PRN pain control  Continue abx  UTI  UA reviewed  Ucx pending -> e. coli  Continue IV abx  Deescalate as indicated  NICM, HFrEF  Hx of paroxysmal afib  Hx of BRANDYN occlusion  AV paced  Continue home meds  CKD 3  Monitor renal function  Avoid nephrotoxic agents  Pancytopenia  Hgb <7 today, given 1 U PRBC  Cont to monitor       Plan of care discussed with RN at bedside       Supplementary Documentation:     Quality:  DVT Prophylaxis: per surgery  CODE status: DNAR/Select      Estimated date of discharge: TBD  Discharge is dependent on: clinical stability  At this point Ms. Silverio is expected to be discharge to: home    MDM: High

## 2024-08-17 NOTE — PLAN OF CARE
Patient is POD of Sacal wound debridement. Max asst. Q2H turns. 2L NC. Voiding via the purewick. Norco given for pain. VSS. Plan pending.       Problem: Patient Centered Care  Goal: Patient preferences are identified and integrated in the patient's plan of care  Description: Interventions:  - What would you like us to know as we care for you?   - Provide timely, complete, and accurate information to patient/family  - Incorporate patient and family knowledge, values, beliefs, and cultural backgrounds into the planning and delivery of care  - Encourage patient/family to participate in care and decision-making at the level they choose  - Honor patient and family perspectives and choices  Outcome: Progressing     Problem: PAIN - ADULT  Goal: Verbalizes/displays adequate comfort level or patient's stated pain goal  Description: INTERVENTIONS:  - Encourage pt to monitor pain and request assistance  - Assess pain using appropriate pain scale  - Administer analgesics based on type and severity of pain and evaluate response  - Implement non-pharmacological measures as appropriate and evaluate response  - Consider cultural and social influences on pain and pain management  - Manage/alleviate anxiety  - Utilize distraction and/or relaxation techniques  - Monitor for opioid side effects  - Notify MD/LIP if interventions unsuccessful or patient reports new pain  - Anticipate increased pain with activity and pre-medicate as appropriate  Outcome: Progressing     Problem: RISK FOR INFECTION - ADULT  Goal: Absence of fever/infection during anticipated neutropenic period  Description: INTERVENTIONS  - Monitor WBC  - Administer growth factors as ordered  - Implement neutropenic guidelines  Outcome: Progressing     Problem: SAFETY ADULT - FALL  Goal: Free from fall injury  Description: INTERVENTIONS:  - Assess pt frequently for physical needs  - Identify cognitive and physical deficits and behaviors that affect risk of falls.  -  [place] fall precautions as indicated by assessment.  - Educate pt/family on patient safety including physical limitations  - Instruct pt to call for assistance with activity based on assessment  - Modify environment to reduce risk of injury  - Provide assistive devices as appropriate  - Consider OT/PT consult to assist with strengthening/mobility  - Encourage toileting schedule  Outcome: Progressing     Problem: SKIN/TISSUE INTEGRITY - ADULT  Goal: Skin integrity remains intact  Description: INTERVENTIONS  - Assess and document risk factors for pressure ulcer development  - Assess and document skin integrity  - Monitor for areas of redness and/or skin breakdown  - Initiate interventions, skin care algorithm/standards of care as needed  Outcome: Progressing  Goal: Incision(s), wounds(s) or drain site(s) healing without S/S of infection  Description: INTERVENTIONS:  - Assess and document risk factors for pressure ulcer development  - Assess and document skin integrity  - Assess and document dressing/incision, wound bed, drain sites and surrounding tissue  - Implement wound care per orders  - Initiate isolation precautions as appropriate  - Initiate Pressure Ulcer prevention bundle as indicated  Outcome: Progressing  Goal: Oral mucous membranes remain intact  Description: INTERVENTIONS  - Assess oral mucosa and hygiene practices  - Implement preventative oral hygiene regimen  - Implement oral medicated treatments as ordered  Outcome: Progressing     Problem: Delirium  Goal: Minimize duration of delirium  Description: Interventions:  - Encourage use of hearing aids, eye glasses  - Promote highest level of mobility daily  - Provide frequent reorientation  - Promote wakefulness i.e. lights on, blinds open  - Promote sleep, encourage patient's normal rest cycle i.e. lights off, TV off, minimize noise and interruptions  - Encourage family to assist in orientation and promotion of home routines  Outcome: Progressing

## 2024-08-17 NOTE — PROGRESS NOTES
Progress Note  Margaret Silverio Patient Status:  Inpatient    3/14/1945 MRN [id number]   Location [facility] 4W/SW/SE Attending Fernando Galan MD   Hosp Day # 5 PCP Johnathon Rodriguez, DO     Subjective:  Pt states she still feels tired but mental status continues to improve. She is oriented x3, answering all questions. Denies CP, SOB or orthopnea. On 2L NC    Objective:  /58 (BP Location: Right arm)   Pulse 60   Temp 97.6 °F (36.4 °C) (Axillary)   Resp 16   Wt 120 lb (54.4 kg)   SpO2 98%   BMI 20.60 kg/m²     Intake/Output:    Intake/Output Summary (Last 24 hours) at 2024 0610  Last data filed at 2024 203  Gross per 24 hour   Intake 307.5 ml   Output 300 ml   Net 7.5 ml       Last 3 Weights   24 1511 120 lb (54.4 kg)   24 0859 120 lb (54.4 kg)   24 1100 112 lb 1.6 oz (50.8 kg)   24 0355 115 lb 3.2 oz (52.3 kg)   24 0620 109 lb 6.4 oz (49.6 kg)   24 0450 112 lb 11.2 oz (51.1 kg)   24 0458 113 lb 9.6 oz (51.5 kg)   24 0508 117 lb 14.4 oz (53.5 kg)   24 0614 123 lb 12.8 oz (56.2 kg)   07/15/24 1830 124 lb 1.6 oz (56.3 kg)       Labs:  Recent Labs   Lab 24  0639 08/15/24  0521 24  0617   GLU 93 83 119*   BUN 54* 62* 61*   CREATSERUM 2.62* 2.68* 2.36*   EGFRCR 18* 18* 21*   CA 9.1 9.5 8.5*   * 136 133*   K 4.6 4.6 4.4   CL 99 103 103   CO2 24.0 24.0 21.0     Recent Labs   Lab 24  0603 24  0639 08/15/24  0521 24  0617 24  1517   RBC 2.68* 2.57* 2.48* 1.95*  --    HGB 8.3* 7.9* 7.8* 6.1* 8.9*   HCT 26.4* 25.6* 24.5* 19.5*  --    MCV 98.5 99.6 98.8 100.0  --    MCH 31.0 30.7 31.5 31.3  --    MCHC 31.4 30.9* 31.8 31.3  --    RDW 18.2* 18.7* 18.6* 18.6*  --    NEPRELIM 1.13*  --  4.17 5.09  --    WBC 2.5* 4.2 6.0 6.4  --    PLT 91.0* 131.0* 207.0 263.0  --          No results for input(s): \"TROP\", \"TROPHS\", \"CK\" in the last 168 hours.    Diagnostics:  No results found.   Review of  Systems   Constitutional: Positive for malaise/fatigue.   Cardiovascular:  Negative for chest pain, dyspnea on exertion, leg swelling, orthopnea and paroxysmal nocturnal dyspnea.   Respiratory:  Negative for cough and shortness of breath.    Gastrointestinal:  Negative for abdominal pain.       Physical Exam:    Gen: alert, oriented x 3, NAD  Heent: pupils equal, reactive. Mucous membranes moist.   Neck: no jvd  Cardiac: regular rate and rhythm, normal S1,S2, 1/6 LLSB murmur, no clicks, rub or gallop  Lungs: coarse, diminished  Abd: soft, NT/ND +bs  Ext: no edema  Skin: Warm, dry  Neuro: No focal deficits      Medications:     metoprolol succinate  25 mg Oral Daily Beta Blocker    midodrine  5 mg Oral TID    levETIRAcetam  500 mg Intravenous Q24H    [Held by provider] bumetanide  1 mg Oral BID (Diuretic)    cefTRIAXone  1 g Intravenous Q24H    sodium hypochlorite   Topical BID    amiodarone  200 mg Oral Daily    cyclobenzaprine  10 mg Oral Nightly    fentaNYL  1 patch Transdermal Q72H    ferrous sulfate  325 mg Oral Daily with breakfast    folic acid  800 mcg Oral Daily    gabapentin  300 mg Oral TID    [Held by provider] isosorbide dinitrate  20 mg Oral TID (Nitrates)    pantoprazole  40 mg Oral BID AC    [Held by provider] sacubitril-valsartan  1 tablet Oral BID       Assessment:  Sacral decubitus ulcer s/p excision of cyst, excisional debridement 8/13  Mgmt per surgery  Chronic HFrEF, NICM, Severe Wide-Open TR  Echo 6/2024 - LVEF 30-35%, dyskinesis of anteroseptal, anterior walls, biatrial dilation, mild AVS, wide open TR, PASP 48mmHg  [facility] 2022 w/non obs disease  GDMT - doses have been held d/t lethargy, low BP  Coreg now changed to toprol d/t lower BP  Entresto 49-51mg po BID - on hold  Spironolactone 25mg po daily - on hold d/t GFR  Farxiga 10mg po daily - on hold d/t GFR  Isordil 20mg po TID - on hold  Bumex 1mg po daily - on hold  Appears compensated on exam   Hypotension - improving; midodrine 5mg po  TID  Altered Mental Status, Toxic Metabolic vs Infectious  Improving - drowsy but answered questions appropriately  EEG w/o seizure activity - on Keppra  Neuro following  GEORGE on CKD  BUN/Cr elevated above baseline, but downtrending  Bumex on hold   Acute on Chronic Anemia  Hgb 6.1 today - PRBC per primary  PRBC per primary  UTI  Urine culture +E Coli   On IV antibx  Paroxysmal Atrial Fibrillation s/p [place] Dual Chamber PPM/ICD - 9/2023  On amiodarone 200mg po daily  Formerly on Eliquis - no longer on AC d/t Hx severe GIB  Hx Bioprosthetic Mitral Valve Replacement  Severe RA - methotrexate at home  Hx Recurrent GIB, AVM s/p clip - 4/2023  Acute on Chronic Anemia  Hgb 6.1 yesterday s/p 1 unit PRBC - recheck 8.9  CBC pending this am    Plan:  CBC, BMP pending this am   Continue amiodarone  Continue toprol (hold for SBP <95)  Decrease entresto to 24/26 po BID (hold for SBP <100)  Resume Bumex 1mg po daily  Continue midodrine 5mg po TID  Hold isordil   Strict I&O, daily weight, daily BMP    Plan of care discussed with patient, RN.    Carole Shaw, APRN  8/17/2024  6:10 AM  [phone] [facility]  [phone] [facility]      Sen and examined.MDM per me.presumably metabolic encephalopath with hx MVR, pacer, RA, anemia, CKD and EF 35% onGDMT. Post debridement sacral decubitus. Continue midodrine, agree with decreasing entresto

## 2024-08-17 NOTE — PLAN OF CARE
Problem: Patient Centered Care  Goal: Patient preferences are identified and integrated in the patient's plan of care  Description: Interventions:  - What would you like us to know as we care for you? ***  - Provide timely, complete, and accurate information to patient/family  - Incorporate patient and family knowledge, values, beliefs, and cultural backgrounds into the planning and delivery of care  - Encourage patient/family to participate in care and decision-making at the level they choose  - Honor patient and family perspectives and choices  Outcome: Progressing     Problem: Patient/Family Goals  Goal: Patient/Family Long Term Goal  Description: Patient's Long Term Goal: ***    Interventions:  - ***  - See additional Care Plan goals for specific interventions  Outcome: Progressing  Goal: Patient/Family Short Term Goal  Description: Patient's Short Term Goal: ***    Interventions:   - ***  - See additional Care Plan goals for specific interventions  Outcome: Progressing     Problem: PAIN - ADULT  Goal: Verbalizes/displays adequate comfort level or patient's stated pain goal  Description: INTERVENTIONS:  - Encourage pt to monitor pain and request assistance  - Assess pain using appropriate pain scale  - Administer analgesics based on type and severity of pain and evaluate response  - Implement non-pharmacological measures as appropriate and evaluate response  - Consider cultural and social influences on pain and pain management  - Manage/alleviate anxiety  - Utilize distraction and/or relaxation techniques  - Monitor for opioid side effects  - Notify MD/LIP if interventions unsuccessful or patient reports new pain  - Anticipate increased pain with activity and pre-medicate as appropriate  Outcome: Progressing     Problem: RISK FOR INFECTION - ADULT  Goal: Absence of fever/infection during anticipated neutropenic period  Description: INTERVENTIONS  - Monitor WBC  - Administer growth factors as ordered  -  Implement neutropenic guidelines  Outcome: Progressing     Problem: SAFETY ADULT - FALL  Goal: Free from fall injury  Description: INTERVENTIONS:  - Assess pt frequently for physical needs  - Identify cognitive and physical deficits and behaviors that affect risk of falls.  - [place] fall precautions as indicated by assessment.  - Educate pt/family on patient safety including physical limitations  - Instruct pt to call for assistance with activity based on assessment  - Modify environment to reduce risk of injury  - Provide assistive devices as appropriate  - Consider OT/PT consult to assist with strengthening/mobility  - Encourage toileting schedule  Outcome: Progressing     Problem: SKIN/TISSUE INTEGRITY - ADULT  Goal: Skin integrity remains intact  Description: INTERVENTIONS  - Assess and document risk factors for pressure ulcer development  - Assess and document skin integrity  - Monitor for areas of redness and/or skin breakdown  - Initiate interventions, skin care algorithm/standards of care as needed  Outcome: Progressing  Goal: Incision(s), wounds(s) or drain site(s) healing without S/S of infection  Description: INTERVENTIONS:  - Assess and document risk factors for pressure ulcer development  - Assess and document skin integrity  - Assess and document dressing/incision, wound bed, drain sites and surrounding tissue  - Implement wound care per orders  - Initiate isolation precautions as appropriate  - Initiate Pressure Ulcer prevention bundle as indicated  Outcome: Progressing  Goal: Oral mucous membranes remain intact  Description: INTERVENTIONS  - Assess oral mucosa and hygiene practices  - Implement preventative oral hygiene regimen  - Implement oral medicated treatments as ordered  Outcome: Progressing     Problem: Delirium  Goal: Minimize duration of delirium  Description: Interventions:  - Encourage use of hearing aids, eye glasses  - Promote highest level of mobility daily  - Provide frequent  reorientation  - Promote wakefulness i.e. lights on, blinds open  - Promote sleep, encourage patient's normal rest cycle i.e. lights off, TV off, minimize noise and interruptions  - Encourage family to assist in orientation and promotion of home routines  Outcome: Progressing

## 2024-08-18 ENCOUNTER — APPOINTMENT (OUTPATIENT)
Dept: GENERAL RADIOLOGY | Facility: HOSPITAL | Age: 79
DRG: 853 | End: 2024-08-18
Payer: MEDICARE

## 2024-08-18 LAB
ALBUMIN SERPL-MCNC: 3.9 G/DL (ref 3.2–4.8)
ANION GAP SERPL CALC-SCNC: 10 MMOL/L (ref 0–18)
BASOPHILS # BLD AUTO: 0.06 X10(3) UL (ref 0–0.2)
BASOPHILS NFR BLD AUTO: 0.8 %
BUN BLD-MCNC: 60 MG/DL (ref 9–23)
BUN/CREAT SERPL: 27 (ref 10–20)
CALCIUM BLD-MCNC: 10 MG/DL (ref 8.7–10.4)
CHLORIDE SERPL-SCNC: 105 MMOL/L (ref 98–112)
CO2 SERPL-SCNC: 21 MMOL/L (ref 21–32)
CREAT BLD-MCNC: 2.22 MG/DL
DEPRECATED RDW RBC AUTO: 68.2 FL (ref 35.1–46.3)
EGFRCR SERPLBLD CKD-EPI 2021: 22 ML/MIN/1.73M2 (ref 60–?)
EOSINOPHIL # BLD AUTO: 0.07 X10(3) UL (ref 0–0.7)
EOSINOPHIL NFR BLD AUTO: 0.9 %
ERYTHROCYTE [DISTWIDTH] IN BLOOD BY AUTOMATED COUNT: 19.7 % (ref 11–15)
GLUCOSE BLD-MCNC: 65 MG/DL (ref 70–99)
GLUCOSE BLDC GLUCOMTR-MCNC: 122 MG/DL (ref 70–99)
GLUCOSE BLDC GLUCOMTR-MCNC: 125 MG/DL (ref 70–99)
GLUCOSE BLDC GLUCOMTR-MCNC: 68 MG/DL (ref 70–99)
GLUCOSE BLDC GLUCOMTR-MCNC: 69 MG/DL (ref 70–99)
GLUCOSE BLDC GLUCOMTR-MCNC: 86 MG/DL (ref 70–99)
GLUCOSE BLDC GLUCOMTR-MCNC: 87 MG/DL (ref 70–99)
HCT VFR BLD AUTO: 31.9 %
HGB BLD-MCNC: 9.4 G/DL
IMM GRANULOCYTES # BLD AUTO: 0.35 X10(3) UL (ref 0–1)
IMM GRANULOCYTES NFR BLD: 4.6 %
LYMPHOCYTES # BLD AUTO: 0.77 X10(3) UL (ref 1–4)
LYMPHOCYTES NFR BLD AUTO: 10.1 %
MCH RBC QN AUTO: 29.2 PG (ref 26–34)
MCHC RBC AUTO-ENTMCNC: 29.5 G/DL (ref 31–37)
MCV RBC AUTO: 99.1 FL
MONOCYTES # BLD AUTO: 1.2 X10(3) UL (ref 0.1–1)
MONOCYTES NFR BLD AUTO: 15.7 %
NEUTROPHILS # BLD AUTO: 5.19 X10 (3) UL (ref 1.5–7.7)
NEUTROPHILS # BLD AUTO: 5.19 X10(3) UL (ref 1.5–7.7)
NEUTROPHILS NFR BLD AUTO: 67.9 %
OSMOLALITY SERPL CALC.SUM OF ELEC: 297 MOSM/KG (ref 275–295)
PHOSPHATE SERPL-MCNC: 3.6 MG/DL (ref 2.4–5.1)
PLATELET # BLD AUTO: 554 10(3)UL (ref 150–450)
POTASSIUM SERPL-SCNC: 5.3 MMOL/L (ref 3.5–5.1)
RBC # BLD AUTO: 3.22 X10(6)UL
SODIUM SERPL-SCNC: 136 MMOL/L (ref 136–145)
WBC # BLD AUTO: 7.6 X10(3) UL (ref 4–11)

## 2024-08-18 PROCEDURE — 99233 SBSQ HOSP IP/OBS HIGH 50: CPT | Performed by: HOSPITALIST

## 2024-08-18 PROCEDURE — 71045 X-RAY EXAM CHEST 1 VIEW: CPT

## 2024-08-18 RX ORDER — NICOTINE POLACRILEX 4 MG
15 LOZENGE BUCCAL
Status: DISCONTINUED | OUTPATIENT
Start: 2024-08-18 | End: 2024-09-10

## 2024-08-18 RX ORDER — TRAMADOL HYDROCHLORIDE 50 MG/1
50 TABLET ORAL EVERY 12 HOURS PRN
Status: DISCONTINUED | OUTPATIENT
Start: 2024-08-18 | End: 2024-09-10

## 2024-08-18 RX ORDER — DEXTROSE MONOHYDRATE 25 G/50ML
50 INJECTION, SOLUTION INTRAVENOUS
Status: DISCONTINUED | OUTPATIENT
Start: 2024-08-18 | End: 2024-09-10

## 2024-08-18 RX ORDER — NICOTINE POLACRILEX 4 MG
30 LOZENGE BUCCAL
Status: DISCONTINUED | OUTPATIENT
Start: 2024-08-18 | End: 2024-09-10

## 2024-08-18 NOTE — PLAN OF CARE
Problem: Patient Centered Care  Goal: Patient preferences are identified and integrated in the patient's plan of care  Description: Interventions:  - Provide timely, complete, and accurate information to patient/family  - Incorporate patient and family knowledge, values, beliefs, and cultural backgrounds into the planning and delivery of care  - Encourage patient/family to participate in care and decision-making at the level they choose  - Honor patient and family perspectives and choices  Outcome: Progressing   Patient alert and oriented X 4 drowsy and tearful at times. Patient was hypoglycemic this morning, hypoglycemia order set in place. Cardiac diet patient having poor appetite, ate pudding and soup brought by her family. Dressing to sacrum changed as ordered. Pain managed with Tylenol IV and PO Tramadol. Patient repositioned every 2 hours and as needed. Fall precautions in place.

## 2024-08-18 NOTE — PROGRESS NOTES
Progress Note  Margaret Silverio Patient Status:  Inpatient    3/14/1945 MRN [id number]   Location [facility] 4W/SW/SE Attending Fernando Galan MD   Hosp Day # 6 PCP Johnathon Rodriguez, DO     Subjective:  Pt drowsy this am but answers my questions. Denies complaint    Objective:  /69 (BP Location: Right arm)   Pulse 58   Temp 98 °F (36.7 °C) (Temporal)   Resp 14   Wt 120 lb (54.4 kg)   SpO2 99%   BMI 20.60 kg/m²     Intake/Output:    Intake/Output Summary (Last 24 hours) at 2024 0937  Last data filed at 2024 0900  Gross per 24 hour   Intake 220 ml   Output 350 ml   Net -130 ml       Last 3 Weights   24 1511 120 lb (54.4 kg)   24 0859 120 lb (54.4 kg)   24 1100 112 lb 1.6 oz (50.8 kg)   24 0355 115 lb 3.2 oz (52.3 kg)   24 0620 109 lb 6.4 oz (49.6 kg)   24 0450 112 lb 11.2 oz (51.1 kg)   24 0458 113 lb 9.6 oz (51.5 kg)   24 0508 117 lb 14.4 oz (53.5 kg)   24 0614 123 lb 12.8 oz (56.2 kg)   07/15/24 1830 124 lb 1.6 oz (56.3 kg)       Labs:  Recent Labs   Lab 24  0617 24  0654 24  0652   * 93 65*   BUN 61* 62* 60*   CREATSERUM 2.36* 2.20* 2.22*   EGFRCR 21* 22* 22*   CA 8.5* 9.5 10.0   * 136 136   K 4.4 4.8 5.3*    106 105   CO2 21.0 21.0 21.0     Recent Labs   Lab 08/15/24  0521 24  0617 24  1517 24  0654 24  0652   RBC 2.48* 1.95*  --  2.70* 3.22*   HGB 7.8* 6.1* 8.9* 8.0* 9.4*   HCT 24.5* 19.5*  --  25.9* 31.9*   MCV 98.8 100.0  --  95.9 99.1   MCH 31.5 31.3  --  29.6 29.2   MCHC 31.8 31.3  --  30.9* 29.5*   RDW 18.6* 18.6*  --  19.7* 19.7*   NEPRELIM 4.17 5.09  --   --  5.19   WBC 6.0 6.4  --  7.5 7.6   .0 263.0  --  394.0 554.0*         No results for input(s): \"TROP\", \"TROPHS\", \"CK\" in the last 168 hours.    Diagnostics:  No results found.   Review of Systems   Cardiovascular:  Negative for chest pain, dyspnea on exertion, leg swelling and orthopnea.    Respiratory:  Negative for cough and shortness of breath.        Physical Exam:    Gen: alert, oriented x 3, NAD  Heent: pupils equal, reactive. Mucous membranes moist.   Neck: + jvd  Cardiac: regular rate and rhythm, normal S1,S2, no murmur, clicks, rub or gallop  Lungs: coarse, diminished  Abd: soft, NT/ND +bs  Ext: no edema  Skin: Warm, dry  Neuro: No focal deficits      Medications:     sacubitril-valsartan  1 tablet Oral BID    bumetanide  1 mg Oral Daily    metoprolol succinate  25 mg Oral Daily Beta Blocker    midodrine  5 mg Oral TID    levETIRAcetam  500 mg Intravenous Q24H    cefTRIAXone  1 g Intravenous Q24H    sodium hypochlorite   Topical BID    amiodarone  200 mg Oral Daily    cyclobenzaprine  10 mg Oral Nightly    fentaNYL  1 patch Transdermal Q72H    ferrous sulfate  325 mg Oral Daily with breakfast    folic acid  800 mcg Oral Daily    gabapentin  300 mg Oral TID    [Held by provider] isosorbide dinitrate  20 mg Oral TID (Nitrates)    pantoprazole  40 mg Oral BID AC       Assessment:  Sacral decubitus ulcer s/p excision of cyst, excisional debridement 8/13  Mgmt per surgery  Chronic HFrEF, NICM, Severe Wide-Open TR  Echo 6/2024 - LVEF 30-35%, dyskinesis of anteroseptal, anterior walls, biatrial dilation, mild AVS, wide open TR, PASP 48mmHg  [facility] 2022 w/non obs disease  GDMT - doses have been held d/t lethargy, low BP during admit  Coreg now changed to toprol d/t lower BP  Entresto decreased dosing  Spironolactone 25mg po daily - on hold d/t GFR  Farxiga 10mg po daily - on hold  Isordil 20mg po TID - on hold  Bumex 1mg po daily   Appears compensated on exam   Hypotension - improving; midodrine 5mg po TID  Altered Mental Status, Toxic Metabolic vs Infectious  Improving - drowsy but answered questions appropriately  EEG w/o seizure activity - on Keppra  Neuro following  GEORGE on CKD  BUN/Cr elevated above baseline, but downtrending  K 5.3  Acute on Chronic Anemia  Hgb 6.1 today - PRBC per primary  PRBC  per primary  UTI  Urine culture +E Coli   On IV antibx  Paroxysmal Atrial Fibrillation s/p [place] Dual Chamber PPM/ICD - 9/2023  On amiodarone 200mg po daily  Formerly on Eliquis - no longer on AC d/t Hx severe GIB  Hx Bioprosthetic Mitral Valve Replacement  Severe RA - methotrexate at home  Hx Recurrent GIB, AVM s/p clip - 4/2023  Acute on Chronic Anemia  Hgb improved s/p PRBC  Hyperkalemia     Plan:  Continue toprol 25mg po daily  Continue midodrine 5mg po TID  Continue bumex - check CXR today  Now on decreased dosing of entresto 24/26 po BID - monitor renal function  Hold isordil  Strict I&O, daily weight, daily BMP    Plan of care discussed with patient, RN.    Carole Shaw, APRN  8/18/2024  9:37 AM  [phone] [facility]  [phone] [facility]      Seen and examined. MDM per me. Metabolic encehplpathy with MVR, EF 35% on GDMT, pacer, RA anemia and post debridement sacral ulcer. If mentally imroves can discuss Evoque or tri clip. For now optimize meds. On Abx for UTI.

## 2024-08-18 NOTE — PLAN OF CARE
Patient is POD of Sacal wound debridement. Max asst. Q2H turns. 2L NC. Voiding via the purewick. Norco given for pain. VSS. Plan pending.       Problem: Patient Centered Care  Goal: Patient preferences are identified and integrated in the patient's plan of care  Description: Interventions:  - What would you like us to know as we care for you?   - Provide timely, complete, and accurate information to patient/family  - Incorporate patient and family knowledge, values, beliefs, and cultural backgrounds into the planning and delivery of care  - Encourage patient/family to participate in care and decision-making at the level they choose  - Honor patient and family perspectives and choices  Outcome: Progressing     Problem: PAIN - ADULT  Goal: Verbalizes/displays adequate comfort level or patient's stated pain goal  Description: INTERVENTIONS:  - Encourage pt to monitor pain and request assistance  - Assess pain using appropriate pain scale  - Administer analgesics based on type and severity of pain and evaluate response  - Implement non-pharmacological measures as appropriate and evaluate response  - Consider cultural and social influences on pain and pain management  - Manage/alleviate anxiety  - Utilize distraction and/or relaxation techniques  - Monitor for opioid side effects  - Notify MD/LIP if interventions unsuccessful or patient reports new pain  - Anticipate increased pain with activity and pre-medicate as appropriate  Outcome: Progressing     Problem: RISK FOR INFECTION - ADULT  Goal: Absence of fever/infection during anticipated neutropenic period  Description: INTERVENTIONS  - Monitor WBC  - Administer growth factors as ordered  - Implement neutropenic guidelines  Outcome: Progressing     Problem: SAFETY ADULT - FALL  Goal: Free from fall injury  Description: INTERVENTIONS:  - Assess pt frequently for physical needs  - Identify cognitive and physical deficits and behaviors that affect risk of falls.  -  Rock fall precautions as indicated by assessment.  - Educate pt/family on patient safety including physical limitations  - Instruct pt to call for assistance with activity based on assessment  - Modify environment to reduce risk of injury  - Provide assistive devices as appropriate  - Consider OT/PT consult to assist with strengthening/mobility  - Encourage toileting schedule  Outcome: Progressing     Problem: SKIN/TISSUE INTEGRITY - ADULT  Goal: Skin integrity remains intact  Description: INTERVENTIONS  - Assess and document risk factors for pressure ulcer development  - Assess and document skin integrity  - Monitor for areas of redness and/or skin breakdown  - Initiate interventions, skin care algorithm/standards of care as needed  Outcome: Progressing  Goal: Incision(s), wounds(s) or drain site(s) healing without S/S of infection  Description: INTERVENTIONS:  - Assess and document risk factors for pressure ulcer development  - Assess and document skin integrity  - Assess and document dressing/incision, wound bed, drain sites and surrounding tissue  - Implement wound care per orders  - Initiate isolation precautions as appropriate  - Initiate Pressure Ulcer prevention bundle as indicated  Outcome: Progressing  Goal: Oral mucous membranes remain intact  Description: INTERVENTIONS  - Assess oral mucosa and hygiene practices  - Implement preventative oral hygiene regimen  - Implement oral medicated treatments as ordered  Outcome: Progressing     Problem: Delirium  Goal: Minimize duration of delirium  Description: Interventions:  - Encourage use of hearing aids, eye glasses  - Promote highest level of mobility daily  - Provide frequent reorientation  - Promote wakefulness i.e. lights on, blinds open  - Promote sleep, encourage patient's normal rest cycle i.e. lights off, TV off, minimize noise and interruptions  - Encourage family to assist in orientation and promotion of home routines  Outcome: Progressing

## 2024-08-18 NOTE — PROGRESS NOTES
[facility]  [facility]     Margaret Silverio Patient Status:  Inpatient    3/14/1945 MRN [id number]   Location [facility] POST ANESTHESIA CARE UNIT Attending Fernando Galan MD   Hosp Day # 6 PCP Johnathon Rodriguez, DO     Subjective:     Pt was seen and examined  Awake and alert today  Still drowsy  No acute distress noted        Objective:    Review of Systems:   ROS completed; pertinent positive and negatives stated in subjective.      Vital signs:  Temp:  [97.9 °F (36.6 °C)-98 °F (36.7 °C)] 98 °F (36.7 °C)  Pulse:  [58-61] 59  Resp:  [14-15] 14  BP: (109-129)/(51-96) 129/53  SpO2:  [92 %-99 %] 99 %      Physical Exam:    Gen: drowsy  Chest: good air entry CTABL  CVS: normal s1 and s2 RR  Abd: NABS soft NT ND  Neuro: non-focal, drowsy  Skin: decub dressing CDI  Ext: no edema in bilateral LE      Diagnostic Data:    Labs:  Recent Labs   Lab 24  0603 24  0639 08/15/24  0521 24  0617 24  1517 24  0654 24  0652   WBC 2.5* 4.2 6.0 6.4  --  7.5 7.6   HGB 8.3* 7.9* 7.8* 6.1* 8.9* 8.0* 9.4*   MCV 98.5 99.6 98.8 100.0  --  95.9 99.1   PLT 91.0* 131.0* 207.0 263.0  --  394.0 554.0*   BAND 1  --  1  --   --   --   --        Recent Labs   Lab 08/15/24  0521 24  0617 24  0654 24  0652   GLU 83 119* 93 65*   BUN 62* 61* 62* 60*   CREATSERUM 2.68* 2.36* 2.20* 2.22*   CA 9.5 8.5* 9.5 10.0   ALB 3.8 3.1*  --  3.9    133* 136 136   K 4.6 4.4 4.8 5.3*    103 106 105   CO2 24.0 21.0 21.0 21.0       Estimated Creatinine Clearance: 17.9 mL/min (A) (based on SCr of 2.22 mg/dL (H)).    No results for input(s): \"PTP\", \"INR\" in the last 168 hours.           Imaging: Imaging data reviewed in Epic.    Medications:    sacubitril-valsartan  1 tablet Oral BID    bumetanide  1 mg Oral Daily    metoprolol succinate  25 mg Oral Daily Beta Blocker    midodrine  5 mg Oral TID    levETIRAcetam  500 mg  Intravenous Q24H    cefTRIAXone  1 g Intravenous Q24H    sodium hypochlorite   Topical BID    amiodarone  200 mg Oral Daily    cyclobenzaprine  10 mg Oral Nightly    fentaNYL  1 patch Transdermal Q72H    ferrous sulfate  325 mg Oral Daily with breakfast    folic acid  800 mcg Oral Daily    gabapentin  300 mg Oral TID    [Held by provider] isosorbide dinitrate  20 mg Oral TID (Nitrates)    pantoprazole  40 mg Oral BID AC       Assessment & Plan:     AMS -> intermittent   Unclear etiology, consider metabolic encephalopathy  Neurology following, EEG WNL  Keppra resumed  CT head non-acute  Abg reviewed  Sacral decubitus ulcer  Gsx on consult  Now s/p Excision of cyst, sharp excisional debridement of buttock decubitus ulcer POD#5  PRN pain control  Continue abx  UTI  UA reviewed  Ucx pending -> e. coli  Continue IV abx  Deescalate as indicated  NICM, HFrEF  Hx of paroxysmal afib  Hx of BRANDYN occlusion  AV paced  Continue home meds  CKD 3  Monitor renal function  Avoid nephrotoxic agents  Pancytopenia  Hgb stable today  Cont to monitor       Plan of care discussed with RN at bedside       Supplementary Documentation:     Quality:  DVT Prophylaxis: per surgery  CODE status: DNAR/Select      Estimated date of discharge: TBD  Discharge is dependent on: clinical stability  At this point Ms. Silverio is expected to be discharge to: home    MDM: High

## 2024-08-19 ENCOUNTER — APPOINTMENT (OUTPATIENT)
Dept: WOUND CARE | Facility: HOSPITAL | Age: 79
End: 2024-08-19
Attending: NURSE PRACTITIONER
Payer: MEDICARE

## 2024-08-19 PROBLEM — Z71.89 GOALS OF CARE, COUNSELING/DISCUSSION: Status: ACTIVE | Noted: 2024-01-01

## 2024-08-19 PROBLEM — Z51.5 PALLIATIVE CARE ENCOUNTER: Status: ACTIVE | Noted: 2024-01-01

## 2024-08-19 PROBLEM — Z71.89 GOALS OF CARE, COUNSELING/DISCUSSION: Status: ACTIVE | Noted: 2024-08-19

## 2024-08-19 PROBLEM — Z51.5 PALLIATIVE CARE ENCOUNTER: Status: ACTIVE | Noted: 2024-08-19

## 2024-08-19 LAB
ALBUMIN SERPL-MCNC: 3.7 G/DL (ref 3.2–4.8)
ANION GAP SERPL CALC-SCNC: 8 MMOL/L (ref 0–18)
BASOPHILS # BLD: 0.08 X10(3) UL (ref 0–0.2)
BASOPHILS NFR BLD: 1 %
BUN BLD-MCNC: 70 MG/DL (ref 9–23)
BUN/CREAT SERPL: 37 (ref 10–20)
CALCIUM BLD-MCNC: 9.5 MG/DL (ref 8.7–10.4)
CHLORIDE SERPL-SCNC: 107 MMOL/L (ref 98–112)
CO2 SERPL-SCNC: 24 MMOL/L (ref 21–32)
CREAT BLD-MCNC: 1.89 MG/DL
DEPRECATED RDW RBC AUTO: 68.5 FL (ref 35.1–46.3)
EGFRCR SERPLBLD CKD-EPI 2021: 27 ML/MIN/1.73M2 (ref 60–?)
EOSINOPHIL # BLD: 0.08 X10(3) UL (ref 0–0.7)
EOSINOPHIL NFR BLD: 1 %
ERYTHROCYTE [DISTWIDTH] IN BLOOD BY AUTOMATED COUNT: 19.7 % (ref 11–15)
GLUCOSE BLD-MCNC: 81 MG/DL (ref 70–99)
GLUCOSE BLDC GLUCOMTR-MCNC: 103 MG/DL (ref 70–99)
GLUCOSE BLDC GLUCOMTR-MCNC: 121 MG/DL (ref 70–99)
GLUCOSE BLDC GLUCOMTR-MCNC: 56 MG/DL (ref 70–99)
GLUCOSE BLDC GLUCOMTR-MCNC: 64 MG/DL (ref 70–99)
GLUCOSE BLDC GLUCOMTR-MCNC: 80 MG/DL (ref 70–99)
HCT VFR BLD AUTO: 28.3 %
HGB BLD-MCNC: 8.4 G/DL
LYMPHOCYTES NFR BLD: 0.88 X10(3) UL (ref 1–4)
LYMPHOCYTES NFR BLD: 11 %
MCH RBC QN AUTO: 29.2 PG (ref 26–34)
MCHC RBC AUTO-ENTMCNC: 29.7 G/DL (ref 31–37)
MCV RBC AUTO: 98.3 FL
METAMYELOCYTES # BLD: 0.08 X10(3) UL
METAMYELOCYTES NFR BLD: 1 %
MONOCYTES # BLD: 1.12 X10(3) UL (ref 0.1–1)
MONOCYTES NFR BLD: 14 %
MYELOCYTES # BLD: 0.08 X10(3) UL
MYELOCYTES NFR BLD: 1 %
NEUTROPHILS # BLD AUTO: 4.79 X10 (3) UL (ref 1.5–7.7)
NEUTROPHILS NFR BLD: 69 %
NEUTS BAND NFR BLD: 2 %
NEUTS HYPERSEG # BLD: 5.68 X10(3) UL (ref 1.5–7.7)
NRBC BLD MANUAL-RTO: 1 %
OSMOLALITY SERPL CALC.SUM OF ELEC: 308 MOSM/KG (ref 275–295)
PHOSPHATE SERPL-MCNC: 3.4 MG/DL (ref 2.4–5.1)
PLATELET # BLD AUTO: 675 10(3)UL (ref 150–450)
POTASSIUM SERPL-SCNC: 5.8 MMOL/L (ref 3.5–5.1)
RBC # BLD AUTO: 2.88 X10(6)UL
SODIUM SERPL-SCNC: 139 MMOL/L (ref 136–145)
TOTAL CELLS COUNTED BLD: 100
WBC # BLD AUTO: 8 X10(3) UL (ref 4–11)

## 2024-08-19 PROCEDURE — 99222 1ST HOSP IP/OBS MODERATE 55: CPT | Performed by: NURSE PRACTITIONER

## 2024-08-19 PROCEDURE — 99233 SBSQ HOSP IP/OBS HIGH 50: CPT | Performed by: HOSPITALIST

## 2024-08-19 RX ORDER — ACETAMINOPHEN 160 MG/5ML
650 SOLUTION ORAL EVERY 8 HOURS
Status: DISCONTINUED | OUTPATIENT
Start: 2024-08-19 | End: 2024-09-08

## 2024-08-19 RX ORDER — HYDROMORPHONE HYDROCHLORIDE 1 MG/ML
0.4 INJECTION, SOLUTION INTRAMUSCULAR; INTRAVENOUS; SUBCUTANEOUS
Status: DISCONTINUED | OUTPATIENT
Start: 2024-08-19 | End: 2024-08-20

## 2024-08-19 RX ORDER — POLYETHYLENE GLYCOL 3350 17 G/17G
17 POWDER, FOR SOLUTION ORAL DAILY PRN
Status: DISCONTINUED | OUTPATIENT
Start: 2024-08-19 | End: 2024-09-10

## 2024-08-19 RX ORDER — HYDROMORPHONE HYDROCHLORIDE 1 MG/ML
0.2 INJECTION, SOLUTION INTRAMUSCULAR; INTRAVENOUS; SUBCUTANEOUS
Status: DISCONTINUED | OUTPATIENT
Start: 2024-08-19 | End: 2024-08-20

## 2024-08-19 RX ORDER — DEXTROSE MONOHYDRATE 50 MG/ML
INJECTION, SOLUTION INTRAVENOUS CONTINUOUS
Status: DISCONTINUED | OUTPATIENT
Start: 2024-08-19 | End: 2024-08-22

## 2024-08-19 RX ORDER — SENNA AND DOCUSATE SODIUM 50; 8.6 MG/1; MG/1
2 TABLET, FILM COATED ORAL
Status: DISCONTINUED | OUTPATIENT
Start: 2024-08-19 | End: 2024-09-10

## 2024-08-19 RX ORDER — HYDROMORPHONE HYDROCHLORIDE 1 MG/ML
0.8 INJECTION, SOLUTION INTRAMUSCULAR; INTRAVENOUS; SUBCUTANEOUS
Status: DISCONTINUED | OUTPATIENT
Start: 2024-08-19 | End: 2024-08-20

## 2024-08-19 NOTE — CONSULTS
[facility]  part of [facility]  Palliative Care Initial Consult Note    Margaret Silverio Patient Status:  Inpatient    3/14/1945 MRN [id number]   Location [facility] 4W/SW/SE Attending Fernando Galan MD   Hosp Day # 7 PCP Johnathon Rodriguez DO     Date of Consult: 2024  Patient seen at: [facility] Inpatient    The  Cures Act makes medical notes like these available to patients in the interest of transparency. Please be advised this is a medical document. Medical documents are intended to carry relevant information, facts as evident, and the clinical opinion of the practitioner. The medical note is intended as peer to peer communication and may appear blunt or direct. It is written in medical language and may contain abbreviations or verbiage that are unfamiliar.     Reason for Consultation: Consult ordered by:: Dr Galan for evaluation of Palliative Care needs and goals of care discussion.     Subjective     History of Present Illness: Margaret Silverio is a 79 year old female with history of CHF-EF30%, MVR, severe tricuspid valve regurgitation, SSS s/p PPM/ICD, recent GIB, RA, CKD, afib and sacral decubiti who was admitted on 2024 from home with worsening buttock decubiti ulcer. Work up in our hospital revealed unstageable decubiti ulcer/necrosis.  History was obtained from Epic and pts dtr Barbi who was present at the bedside.       This is the 5th hospitalization in the past 3 months.    Today is day #7 of hospitalization.     When I entered the room, the patient was in the bed she is drowsy but arousable, she appears very weak and cachetic. Pt expresses a lot of pain in her buttock area from her decubiti, there is a tear coming down from her cheek. She is on Fentanyl patch 12mcgs, taking 1mg ivp morphine X1, gabapentin 300mg TID, she has not had any Norco doses.      Palliative Care symptom needs assessed:   Pertinent items are noted in  HPI/below    Fatigue:  Yes, increasing weakness  Dyspnea: denies   Current O2 therapy: 2 liters NC  Cough: denies  Pain Present: + pain buttock/sacral wound  Non-verbal signs of pain present: Yes  Appetite: poor  Constipation: denies  Diarrhea: denies  Nausea/Vomiting: denies  Depression: + depression  Anxiety: denies    Palliative Care Social History:   Marital Status:  to her spouse for 60yrs  Children: 3 children  Living Situation Prior to Admit: pt was living at home with grandchildren  Is Patient Confused: No  Occupational History: retired /principle    Substance History:  Smoking history: Former smoker  Hx of Substance Use/Abuse: No    Spiritual Assessment:   Rastafarian - [facility]  Family did not request  services for support.    Past Medical History/Past Surgical History: obtained from "[employer]  Past Medical History:    Acute kidney insufficiency    Anemia    Arrhythmia    Back problem    CHF (congestive heart failure) (HCC)    CKD (chronic kidney disease) stage 3, GFR 30-59 ml/min (HCC)    Deep vein thrombosis (HCC)    on B.T for only a month, possibly Magen    Essential hypertension    High blood pressure    History of blood transfusion    Rheumatoid arthritis (HCC)    Seizure disorder (HCC)    Pt denied at screening call 6/28/24     Past Surgical History:   Procedure Laterality Date    Cabg      Cardiac pacemaker placement      Colonoscopy N/A 01/17/2024    Dr. Rios    Colonoscopy N/A 01/17/2024    Procedure: COLONOSCOPY;  Surgeon: Zoraida Rios MD;  Location: [facility] ENDOSCOPY    Egd N/A 01/17/2024    Dr. Rios    Fracture surgery      Other surgical history  2017    Heart Surgery     Other surgical history  2020    Bilateral shoulder replacement        Nutritional Status:  BMI:20 Weight: 120lbs  Weight changes: + wt loss over the last few months, wts are fluctuant as she has CHF with fluid volume overload. Dtr stats she has probably lost about 40-50lbs over last yr  Current  Appetite: poor  Dysphagia: denies    Family History: obtained from [facility]  No family history on file.    Allergies:  Allergies   Allergen Reactions    Lisinopril Coughing       Medications:     Current Facility-Administered Medications:     glucose (Dex4) 15 GM/59ML oral liquid 15 g, 15 g, Oral, Q15 Min PRN **OR** glucose (Glutose) 40% oral gel 15 g, 15 g, Oral, Q15 Min PRN **OR** glucose-vitamin C (Dex-4) chewable tab 4 tablet, 4 tablet, Oral, Q15 Min PRN **OR** dextrose 50% injection 50 mL, 50 mL, Intravenous, Q15 Min PRN **OR** glucose (Dex4) 15 GM/59ML oral liquid 30 g, 30 g, Oral, Q15 Min PRN **OR** glucose (Glutose) 40% oral gel 30 g, 30 g, Oral, Q15 Min PRN **OR** glucose-vitamin C (Dex-4) chewable tab 8 tablet, 8 tablet, Oral, Q15 Min PRN    traMADol (Ultram) tab 50 mg, 50 mg, Oral, Q12H PRN    sacubitril-valsartan (Entresto) 24-26 MG per tab 1 tablet, 1 tablet, Oral, BID    bumetanide (Bumex) tab 1 mg, 1 mg, Oral, Daily    metoprolol succinate ER (Toprol XL) 24 hr tab 25 mg, 25 mg, Oral, Daily Beta Blocker    midodrine (ProAmatine) tab 5 mg, 5 mg, Oral, TID    levETIRAcetam (Keppra) 500 mg/5mL injection 500 mg, 500 mg, Intravenous, Q24H    acetaminophen (Ofirmev) 10 mg/mL infusion premix 1,000 mg, 1,000 mg, Intravenous, Q6H PRN    morphINE PF 2 MG/ML injection 1 mg, 1 mg, Intravenous, Q3H PRN    ondansetron (Zofran) 4 MG/2ML injection 4 mg, 4 mg, Intravenous, Q6H PRN    metoclopramide (Reglan) 5 mg/mL injection 5 mg, 5 mg, Intravenous, Q8H PRN    cefTRIAXone (Rocephin) 1 g in sodium chloride 0.9% 100 mL IVPB-ADDV, 1 g, Intravenous, Q24H    sodium hypochlorite (Dakin's) 0.125 % external solution, , Topical, BID    acetaminophen (Tylenol Extra Strength) tab 500 mg, 500 mg, Oral, Q4H PRN    amiodarone (Pacerone) tab 200 mg, 200 mg, Oral, Daily    cyclobenzaprine (Flexeril) tab 10 mg, 10 mg, Oral, Nightly    fentaNYL (Duragesic) 12 MCG/HR patch 1 patch, 1 patch, Transdermal, Q72H    ferrous sulfate DR tab 325 mg,  325 mg, Oral, Daily with breakfast    folic acid (Folvite) tab 800 mcg, 800 mcg, Oral, Daily    gabapentin (Neurontin) cap 300 mg, 300 mg, Oral, TID    HYDROcodone-acetaminophen (Norco)  MG per tab 1 tablet, 1 tablet, Oral, Q8H PRN    [Held by provider] isosorbide dinitrate (Isordil) tab 20 mg, 20 mg, Oral, TID (Nitrates)    pantoprazole (Protonix) DR tab 40 mg, 40 mg, Oral, BID AC    Functional Status History:  Falls: Yes, + occasional falls from the bed  ADLs: Barbi states her mother was just walking with walker at home, up until last hospitalization, she has become increasingly weaker and frail, + sacral decubiti, + incontinence decreased appetite.    Palliative Performance Scale:   Prior to admission: (pt/family reported) 50 %  Observed during hospitalization: 40 %  % Ambulation Activity Level Self-Care Intake Consciousness   100 Full  Normal  No Disease Full Normal Full   90 Full  Normal  Some Disease Full Normal Full   80 Full  Normal w/effort  Some Disease Full Normal or reduced Full   70 Reduced  Can't Perform Job  Some Disease Full Normal or reduced Full   60 Reduced  Can't Perform Hobby   Significant Disease Occ Assist Normal or reduced Full or confused   50 Mainly sit/lie Can't do any work  Extensive Disease Partial Assist Normal or reduced Full or confused   40 Mainly in bed Can't do any work  Extensive Disease Mainly Assist Normal or reduced Full or confused   30 Bed Bound Can't do any work  Extensive Disease Max Assist  Total Care Reduced  Drowsy/confused   20 Bed Bound Can't do any work  Extensive Disease Max Assist  Total Care Minimal  Drowsy/confused   10 Bed Bound Can't do any work  Extensive Disease Max Assist  Total Care Mouth Care  Drowsy/confused   0 Death        Objective      Vital Signs:  Blood pressure 123/66, pulse 60, temperature 98.5 °F (36.9 °C), temperature source Oral, resp. rate 16, weight 120 lb (54.4 kg), SpO2 100%.  Body mass index is 20.6 kg/m².  Present Level of pain: pt  doesn't rate her pain she has tear coming down her cheek.  Non-verbal signs of pain present: Yes    General: Margaret is in the bed she is drowsy but arousable, she appears elderly, cachetic and frail.   HEENT: dry oral MM  Cardiac: + murmer regular rate and rhythm, S1, S2 normal, no rub or gallop.  Lungs: diminished without wheezes, rales, rhonchi or dullness.  Normal excursions and effort.2 liters NC  Abdomen: Soft, flat non-tender, + bowel sounds, no rebound or guarding, + BM  Extremities: Without clubbing, cyanosis. BLE Edema not present  Neurologic: drowsy but arousable, flat affect  Skin: Warm and dry.  + sacral wound see wound care note    Hematology:  Lab Results   Component Value Date    WBC 7.6 08/18/2024    HGB 9.4 (L) 08/18/2024    HCT 31.9 (L) 08/18/2024    .0 (H) 08/18/2024       Coags:  Lab Results   Component Value Date    INR 1.25 (H) 02/09/2024    PTT 35.2 02/09/2024       Chemistry:  Lab Results   Component Value Date    CREATSERUM 2.22 (H) 08/18/2024    BUN 60 (H) 08/18/2024     08/18/2024    K 5.3 (H) 08/18/2024     08/18/2024    CO2 21.0 08/18/2024    GLU 65 (L) 08/18/2024    CA 10.0 08/18/2024    ALB 3.9 08/18/2024    ALKPHO 328 (H) 06/18/2024    BILT 0.8 06/18/2024    TP 7.7 06/18/2024    AST 23 06/18/2024    ALT 11 06/18/2024    MG 2.8 (H) 08/17/2024    PHOS 3.6 08/18/2024       Imaging:  XR CHEST AP PORTABLE  (CPT=71045)    Result Date: 8/18/2024  CONCLUSION:   Moderate cardiomegaly with interstitial and alveolar pulmonary edema.  Small to moderate left pleural effusion.  Small loculated right pleural effusion.  Bibasilar alveolar opacities, thought to relate to alveolar edema.  Other superimposed process not excluded.  Recommend attention on follow-up.     Dictated by (CST): Nadine Blankenship MD on 8/18/2024 at 12:04 PM     Finalized by (CST): Nadine Blankenship MD on 8/18/2024 at 12:07 PM             Summary of Discussion      I discussed reason for palliative care  consultation with the pt and her dtr Luis who was at the bedside.     I differentiated the palliative treatment-focus model versus the hospice comfort-focused philosophy of care. I informed the patient/family that having palliative care support does not limit medical treatment options or decisions to those who wish to continue curative or restorative medical therapies. I discussed the benefits of palliative care to include assistance with arising symptom management needs, an extra layer of support, to ensure GOC are respected throughout healthcare continuum, and assist with transition to hospice care when appropriate.      Outpatient/Community Palliative Care Services:  Usually visit once per 4 weeks  Focus on GOC and symptom management   Palliative Care criteria:  Not altered by prognosis   Does not limit curative or restorative therapies      Outpatient Hospice services:  24/7 phone triage services   RN visit one or more times per week depending on need  Home health aid to assist in ADLs/hygiene   Hospice criteria:  Less than six-month prognosis   Must forego most life-prolonging  measures/treatments   Focus solely on comfort   Must sign onto hospice benefit with agency     Prognostic awareness/understanding:   Barbi stated her mother has been mainly in chair or bed at home, she has become more incontinent and + for wt loss, + falls.Barbi states her mother developed sacral decubiti last hospitalization.   Discussed her mother's frequent hospitalizations in the last 3 months. Barbi stated her mother keeps saying she does not want to return back to the hospital, but when she gets more SOB or symptoms worsen,  Barbi calls 911 to help manage. I discussed if her mother wishes to remain at  home and not want to be re hospitalized I could have hospice team meet with the pt and family while she is here to get informational session. We discussed focusing on comfort measures.   Discussed keeping her mother  comfortable with pain medications is also causing increased drowsiness which is concerning as then her mother doesn't eat well.    Barbi stated she will speak with her father about meeting with hospice team.    Hopes/goals:   Barbi and the family are hopeful Margaret will be able to return to home. And progress back to baseline.     Fears/concerns:   Family are fearful Margaret will not progress and her pain mot managed.   I discussed not to lose hope but not to be surprised if she continues to decline.   Provided emotional support to the pt and dtr who are coping adequately.     Advance Care Planning counseling and discussion:   I confirmed that patient's HCPOA is Barbi Silverio [phone].   I confirmed POLST form that is on file for DNAR/DNI with selective treatment.   Assessment and Recommendations         Pressure injury of skin of sacral region, unspecified injury stage    S/P I&D 8/13/24    Pancytopenia    Non ischemic Cardiomyopathy    SSS PPM/AICD    Afib    Urinary tract infection without hematuria,    Anemia    Encephalopathy    CKD    H/o GIB    Frequent hospitalizations    H/o hypotension    Pain-concern of increased drowsiness will monitor pt.   -scheduleTylenol 650mg every 8 hours  -Continue with Fentanyl patch 12mcg  -stop the morphine due to kidney function  -Dilaudid panel ivp prn more severe pain  -continue with gabapentin 300mg TID  -Flexeril nightly  -Norco 10/325 as needed     Poor appetite  -offered Remeron daily for depression and appetite, Barbi stated she will talk with pt and family.    Bowel regimen  -add senna daily as needed  -Miralax daily as needed    Goals of care counseling  -see above for details  -Pt is DNAR/DNI with selective treatment  -Agreeable to palliative care following  -Dispo: TBD. Dtr is agreeable to [facility]. SW to help with dc planning.   - not requested.   -ongoing GOC discussions will be needed over time.  -pts dtr Barbi will talk with family about getting  hospice informational session while here.   -Provided emotional support to pt/family who are coping adequately    Advance care planning  -see above for details  -Pt's dtr Barbi Silverio is [facility] [phone]  -HCPOA and POLST pw are on file in [facility].     Palliative Performance Scale 30%    Discussed today's visit with Dr Galan, Marguerite GIL and Taryn BLACKBURN    Palliative Care Follow Up: Palliative care team will continue to follow.  Feel free to contact our team with any questions or concerns.    Thank you for allowing Palliative Care services to participate in the care of Margaret Silverio.    A total of 55 minutes were spent on this consult, which included all of the following: chart review, direct face to face contact, history taking, physical examination, counseling and coordinating care, and documentation.     Macy Marrufo, APRN [id number]  8/19/2024  3:40PM  Palliative Care Services

## 2024-08-19 NOTE — DIETARY NOTE
ADULT NUTRITION REASSESSMENT    Pt is at high nutrition risk.  Pt meets severe malnutrition criteria.      CRITERIA FOR MALNUTRITION DIAGNOSIS:  Criteria for severe malnutrition diagnosis: acute illness/injury related to wt loss greater than 7.5% in 3 months, energy intake less than 50% for greater than 5 days, body fat moderate depletion, and muscle mass moderate depletion.    RECOMMENDATIONS TO MD: See Nutrition Intervention for oral nutritional supplement (ONS) specifics     ADMITTING DIAGNOSIS:  Urinary tract infection without hematuria, site unspecified [N39.0]  Anemia, unspecified type [D64.9]  Pressure injury of skin of sacral region, unspecified injury stage [L89.159]  PERTINENT PAST MEDICAL HISTORY:   Past Medical History:    Acute kidney insufficiency    Anemia    Arrhythmia    Back problem    CHF (congestive heart failure) (HCC)    CKD (chronic kidney disease) stage 3, GFR 30-59 ml/min (HCC)    Deep vein thrombosis (HCC)    on B.T for only a month, possibly Magen    Essential hypertension    High blood pressure    History of blood transfusion    Rheumatoid arthritis (HCC)    Seizure disorder (HCC)    Pt denied at screening call 6/28/24     PATIENT STATUS: Initial 08/14/24: Pt admit for urinary tract infection without hematuria, anemia and pressure injury or skin of sacral region. PMH sig for HTN, CHF, CKD and others noted above. Pt assessed due to screening at risk for decreased appetite, unintentional weight loss and pressure injury (PI) - unstageable and maceration to right buttock and stage 2 & shear to left buttock. Pt known to nutrition services from previous admissions, last seen 6/19/24 and provided with HF diet education.  Chart reviewed, pt admitted with c/o sacral wound x 3 weeks and dark/loose stools. Pt with recent admission (7/15-7/22) for acute on chronic congestive heart failure - treated with diuretics and dobutamine. General surgery consulted for debridement.  POD#1 s/p sacral debridement.  Discussion with RN, poor appetite. Intakes reviewed, 25-40% x 2 meals since admit (poor). Pt visited, resting with eyes closed but answering majority of questions. Pt reports poor appetite/intake but unable to report timeframe. Pt reports not eating. Pt notes used to drink Ensure but stopped due to going right through causing pain/difficulties prior to surgery. Pt reports weight is not good, usual body weight (UBW) 142# but unable to determine last time weighed this. Current weight 120#. EMR weight review, last known weight 112# 2 oz on 8/1/24. Per EMR weight review, pt noted weighing 129# 10 oz on 7/1/24 - 9.6# or 7.4% weight loss x 1 month (significant), 129# 13 oz on 5/16/24 - 9.8# or 7.6% weight loss x 3 months (significant), 149# 14 oz on 2/3/24 - 29.9# or 19.9% weight loss x 6 months (significant) and 141# 14 oz on 8/21/23 - 21.9# or 15.4% weight loss x 1 year (non-significant). Nutrition focused physical exam (NFPE) completed, see below for details. Encouraged small frequent meals with emphasis on high calorie and high protein and ONS to help maximize nutrition. +ONS  per discussion.    8/19 UPDATE: pt slow to respond but did awake when I directed a question to her, but then fell back asleep. Daughter reports that her dad can get the pt to drink some of the ensure enlive. Otherwise pt eating very little. Pt with high K+ today--not diet related due to limited intake, but did adjust diet to liberalize cardiac diet and added low K+.      FOOD/NUTRITION RELATED HISTORY:  Appetite:  decreased/poor appetite PTA per pt. Not eating much  Intake:  small intake. 100% below was a pudding.    Intake Meeting Needs: No, but oral nutrition supplements (ONS) to maximize   Percent Meals Eaten (last 6 days)       Date/Time Percent Meals Eaten (%)    08/13/24 1230 40 %    08/14/24 1000 25 %    08/14/24 1800 0 %    08/15/24 1745 100 %     Percent Meals Eaten (%): ate ice cream; at 08/15/24 1745    08/18/24 0900 --     Percent  Meals Eaten (%): pudding at 08/18/24 0900    08/18/24 1419 100 %          Food Allergies: No Known Food Allergies (NKFA)  Cultural/Ethnic/Tenriism Preferences: Not Obtained    GASTROINTESTINAL: +BM 8/18/2024    MEDICATIONS: reviewed      acetaminophen  650 mg Oral Q8H    patiromer  8.4 g Oral Once    sacubitril-valsartan  1 tablet Oral BID    bumetanide  1 mg Oral Daily    metoprolol succinate  25 mg Oral Daily Beta Blocker    midodrine  5 mg Oral TID    levETIRAcetam  500 mg Intravenous Q24H    cefTRIAXone  1 g Intravenous Q24H    sodium hypochlorite   Topical BID    amiodarone  200 mg Oral Daily    cyclobenzaprine  10 mg Oral Nightly    fentaNYL  1 patch Transdermal Q72H    ferrous sulfate  325 mg Oral Daily with breakfast    folic acid  800 mcg Oral Daily    gabapentin  300 mg Oral TID    [Held by provider] isosorbide dinitrate  20 mg Oral TID (Nitrates)    pantoprazole  40 mg Oral BID AC     LABS: reviewed hyperkalemia past 2 days. Noted other renal labs.    A1c 5.9% on 11/27/22  Recent Labs     08/16/24  0617 08/17/24  0654 08/18/24  0652 08/19/24  1247   * 93 65* 81   BUN 61* 62* 60* 70*   CREATSERUM 2.36* 2.20* 2.22* 1.89*   CA 8.5* 9.5 10.0 9.5   MG 1.8 2.8*  --   --    * 136 136 139   K 4.4 4.8 5.3* 5.8*    106 105 107   CO2 21.0 21.0 21.0 24.0   PHOS 3.3  --  3.6 3.4   OSMOCALC 294 299* 297* 308*     NUTRITION RELATED PHYSICAL FINDINGS:  - Nutrition Focused Physical Exam (NFPE): moderate depletion body fat triceps region and moderate depletion muscle mass clavicle region unable to evaluate lower extremities at this time - largely bed-bound but will get up and move to a chair for part of the day most days  - Fluid Accumulation: none  see RN documentation for details  - Skin Integrity: Pressure Injury: Stage 2 and shear on left buttock and unstageable on right buttock, at risk, and surgical wound(s) see RN documentation for details    ANTHROPOMETRICS:  HT:  162.6 cm (5' 4\")  WT: 54.4 kg  (120 lb) --no current wt. No need to get updated wt at this time.    BMI: Body mass index is 20.6 kg/m².  BMI CLASSIFICATION: <22 considered underweight for advanced age  IBW: 120 lbs        100% IBW  Usual Body Wt: 142 lbs per pt but unable to determine timeframe      85% UBW    WEIGHT HISTORY: Per EMR weight review, pt noted weighing 149# February 2024 and 141# August 2023  Patient Weight(s) for the past 336 hrs:   Weight   08/12/24 1511 54.4 kg (120 lb)   08/12/24 0859 54.4 kg (120 lb)     Wt Readings from Last 10 Encounters:   08/12/24 54.4 kg (120 lb)   08/01/24 50.8 kg (112 lb 1.6 oz)   07/22/24 52.3 kg (115 lb 3.2 oz)   07/09/24 56.6 kg (124 lb 12.8 oz)   06/28/24 61.7 kg (136 lb)   07/01/24 58.8 kg (129 lb 9.6 oz)   06/29/24 60.8 kg (134 lb)   06/26/24 63.5 kg (140 lb)   06/21/24 63.5 kg (140 lb)   06/13/24 62.1 kg (137 lb)     NUTRITION DIAGNOSIS/PROBLEM:   Severe Malnutrition related to Acute - Physiological causes resulting in anorexia or diminished intake as evidenced by wt loss greater than 7.5% in 3 months, energy intake less than 50% for greater than 5 days, body fat moderate depletion, and muscle mass moderate depletion.    NUTRITION DIAGNOSIS PROGRESS:  No Improvement (continue)--limited po intake.      NUTRITION INTERVENTION:   NUTRITION PRESCRIPTION:   Estimated Nutrition needs: --dosing wt of 54.4 kg - wt taken on 8/12/2024  Calories: 4746-6377 calories/day (28-32 calories per kg Dosing wt)  Protein: 64-80 g protein/day (1.2-1.5 g protein/kg Dosing wt)    - Diet:       Procedures    Regular/General diet Sodium Restriction: 3-4 GM NA; Is Patient on Accuchecks? No; Misc Restriction: Low Potassium      - RD Malnutrition Care Plan: Encouraged increased PO intake, Encouraged small frequent meals with emphasis on high calorie/high protein, and Initiated ONS (oral nutritional supplements)  - Meals and snacks: Encouraged small frequent meals and Encouraged increased PO intake  - Medical Food  Supplements-RD added Ensure Enlive (350 calories/ 20 g protein each) BID Strawberry. Rational/use of oral supplements discussed.  - Vitamin and mineral supplements: folic acid and iron/MD  - Feeding assistance: meal set up  - Nutrition education: Discussed importance of adequate energy and protein intake     - Coordination of nutrition care: collaboration with other providers  - Discharge and transfer of nutrition care to new setting or provider: monitor plans    MONITOR AND EVALUATE/NUTRITION GOALS:  - Food and Nutrient Intake:      Monitor: adequacy of PO intake and adequacy of supplement intake  - Food and Nutrient Administration:      Monitor: N/A  - Anthropometric Measurement:    Monitor weight  - Nutrition Goals:      halt wt loss, PO and supplement greater than 75% of needs, good supplement intake, labs within acceptable limits, euglycemia, support wound healing, monitor fluid status, and improved GI status    DIETITIAN FOLLOW UP: RD to follow and monitor nutrition status    Lila Richardson RD, LDN   Clinical Dietitian [id number]

## 2024-08-19 NOTE — PLAN OF CARE
Patient is POD 6 of Sacral wound debridement. Max asst. Q2H turns. 2L NC. Voiding via the purewick. Norco given for pain. VSS - BP on lower side. Plan pending.       Problem: Patient Centered Care  Goal: Patient preferences are identified and integrated in the patient's plan of care  Description: Interventions:  - What would you like us to know as we care for you?   - Provide timely, complete, and accurate information to patient/family  - Incorporate patient and family knowledge, values, beliefs, and cultural backgrounds into the planning and delivery of care  - Encourage patient/family to participate in care and decision-making at the level they choose  - Honor patient and family perspectives and choices  Outcome: Progressing     Problem: PAIN - ADULT  Goal: Verbalizes/displays adequate comfort level or patient's stated pain goal  Description: INTERVENTIONS:  - Encourage pt to monitor pain and request assistance  - Assess pain using appropriate pain scale  - Administer analgesics based on type and severity of pain and evaluate response  - Implement non-pharmacological measures as appropriate and evaluate response  - Consider cultural and social influences on pain and pain management  - Manage/alleviate anxiety  - Utilize distraction and/or relaxation techniques  - Monitor for opioid side effects  - Notify MD/LIP if interventions unsuccessful or patient reports new pain  - Anticipate increased pain with activity and pre-medicate as appropriate  Outcome: Progressing     Problem: RISK FOR INFECTION - ADULT  Goal: Absence of fever/infection during anticipated neutropenic period  Description: INTERVENTIONS  - Monitor WBC  - Administer growth factors as ordered  - Implement neutropenic guidelines  Outcome: Progressing     Problem: SAFETY ADULT - FALL  Goal: Free from fall injury  Description: INTERVENTIONS:  - Assess pt frequently for physical needs  - Identify cognitive and physical deficits and behaviors that affect  risk of falls.  - Crosby fall precautions as indicated by assessment.  - Educate pt/family on patient safety including physical limitations  - Instruct pt to call for assistance with activity based on assessment  - Modify environment to reduce risk of injury  - Provide assistive devices as appropriate  - Consider OT/PT consult to assist with strengthening/mobility  - Encourage toileting schedule  Outcome: Progressing     Problem: SKIN/TISSUE INTEGRITY - ADULT  Goal: Skin integrity remains intact  Description: INTERVENTIONS  - Assess and document risk factors for pressure ulcer development  - Assess and document skin integrity  - Monitor for areas of redness and/or skin breakdown  - Initiate interventions, skin care algorithm/standards of care as needed  Outcome: Progressing  Goal: Incision(s), wounds(s) or drain site(s) healing without S/S of infection  Description: INTERVENTIONS:  - Assess and document risk factors for pressure ulcer development  - Assess and document skin integrity  - Assess and document dressing/incision, wound bed, drain sites and surrounding tissue  - Implement wound care per orders  - Initiate isolation precautions as appropriate  - Initiate Pressure Ulcer prevention bundle as indicated  Outcome: Progressing  Goal: Oral mucous membranes remain intact  Description: INTERVENTIONS  - Assess oral mucosa and hygiene practices  - Implement preventative oral hygiene regimen  - Implement oral medicated treatments as ordered  Outcome: Progressing     Problem: Delirium  Goal: Minimize duration of delirium  Description: Interventions:  - Encourage use of hearing aids, eye glasses  - Promote highest level of mobility daily  - Provide frequent reorientation  - Promote wakefulness i.e. lights on, blinds open  - Promote sleep, encourage patient's normal rest cycle i.e. lights off, TV off, minimize noise and interruptions  - Encourage family to assist in orientation and promotion of home routines  Outcome:  Progressing

## 2024-08-19 NOTE — PLAN OF CARE
0925: Called phlebotomy asking if 0500 labs were drawn because there are no results pending. Julissa states that she's aware they were not drawn & other stat labs are before her, and she will be drawing Margaret's as soon as possible.

## 2024-08-19 NOTE — PROGRESS NOTES
Cardiology Progress Note    Margaret Silverio Patient Status:  Inpatient    3/14/1945 MRN [id number]   Location [facility] 4W/SW/SE Attending Fernando Galan MD   Hosp Day # 7 PCP Johnathon Rodriguez, DO     Interval Note:  Pt seen and examined   No new complaints at this time      --------------------------------------------------------------------------------------------------------------------------------  ROS 12 systems reviewed, pertinent findings above.  ROS    History:  Past Medical History:    Acute kidney insufficiency    Anemia    Arrhythmia    Back problem    CHF (congestive heart failure) (HCC)    CKD (chronic kidney disease) stage 3, GFR 30-59 ml/min (HCC)    Deep vein thrombosis (HCC)    on B.T for only a month, possibly Magen    Essential hypertension    High blood pressure    History of blood transfusion    Rheumatoid arthritis (HCC)    Seizure disorder (HCC)    Pt denied at screening call 24     Past Surgical History:   Procedure Laterality Date    Cabg      Cardiac pacemaker placement      Colonoscopy N/A 2024    Dr. Rios    Colonoscopy N/A 2024    Procedure: COLONOSCOPY;  Surgeon: Zoraida Rios MD;  Location: [facility] ENDOSCOPY    Egd N/A 2024    Dr. Rios    Fracture surgery      Other surgical history      Heart Surgery     Other surgical history      Bilateral shoulder replacement      No family history on file.   reports that she quit smoking about 10 years ago. Her smoking use included cigarettes. She has never used smokeless tobacco. She reports that she does not drink alcohol and does not use drugs.    Objective:   Temp: 98.5 °F (36.9 °C)  Pulse: 60  Resp: 16  BP: 123/66    Intake/Output:     Intake/Output Summary (Last 24 hours) at 2024 1248  Last data filed at 2024 0824  Gross per 24 hour   Intake 440 ml   Output 725 ml   Net -285 ml       Physical Exam:    General: awake and in no acute distress  HEENT: Normocephalic, anicteric sclera,  neck supple.  Neck: No JVD, carotids 2+, no bruits.  Cardiac: Regular rate and rhythm. S1, S2 normal. No murmur, pericardial rub, S3.  Lungs: Clear without wheezes, rales, rhonchi or dullness.  Normal excursions and effort.  Abdomen: Soft, non-tender. BS-present.  Extremities: Without clubbing, cyanosis or edema.  Peripheral pulses are 2+.  Neurologic: Non-focal  Skin: Warm and dry.       Sacral decubitus ulcer s/p excision of cyst, excisional debridement 8/13  Mgmt per surgery  Chronic HFrEF, NICM, Severe Wide-Open TR  Echo 6/2024 - LVEF 30-35%, dyskinesis of anteroseptal, anterior walls, biatrial dilation, mild AVS, wide open TR, PASP 48mmHg  [facility] 2022 w/non obs disease  GDMT - doses have been held d/t lethargy, low BP during admit  Coreg now changed to toprol d/t lower BP  Entresto tolerating  Spironolactone 25mg po daily - on hold d/t GFR  Farxiga 10mg po daily - on hold  Isordil 20mg po TID - on hold  Bumex 1mg po daily   Appears compensated on exam   Hypotension - improving; midodrine 5mg po TID  Altered Mental Status, Toxic Metabolic vs Infectious  Improving - drowsy but answered questions appropriately  EEG w/o seizure activity - on Keppra  Neuro following  GEORGE on CKD  BUN/Cr elevated above baseline, but downtrending  K 5.3  Acute on Chronic Anemia  Hgb 6.1 today - PRBC per primary  PRBC per primary  UTI  Urine culture +E Coli   On IV antibx  Paroxysmal Atrial Fibrillation s/p [place] Dual Chamber PPM/ICD - 9/2023  On amiodarone 200mg po daily  Formerly on Eliquis - no longer on AC d/t Hx severe GIB  Hx Bioprosthetic Mitral Valve Replacement  Severe RA - methotrexate at home  Hx Recurrent GIB, AVM s/p clip - 4/2023  Acute on Chronic Anemia  Hgb improved s/p PRBC  Hyperkalemia     Plan  Pt appears euvolemic  Palliative care has been consulted  BP is rebounding - overnight and this morning  Monitor BP on ARNI, BB, bumex - cont holding nitrates, SGLT2i    Thank you for allowing me to take part in the care of  Margaret Silverio. Please call with any questions of concerns.        Level of care: L3    Adriana Myers DO  [facility]   Interventional Cardiac and Vascular Services      Adriana Myers DO  August 19, 2024  12:48 PM

## 2024-08-19 NOTE — PROGRESS NOTES
[facility]  [facility]     Margaret Silverio Patient Status:  Inpatient    3/14/1945 MRN [id number]   Location [facility] POST ANESTHESIA CARE UNIT Attending Fernando Galan MD   Hosp Day # 7 PCP Johnathon Rodriguez, DO     Subjective:     Pt was seen and examined  Drowsy but arousable  Daughter at bedside   No acute distress noted  Denies an pain        Objective:    Review of Systems:   ROS completed; pertinent positive and negatives stated in subjective.      Vital signs:  Temp:  [97.2 °F (36.2 °C)-98.5 °F (36.9 °C)] 97.5 °F (36.4 °C)  Pulse:  [59-61] 60  Resp:  [16] 16  BP: ()/(41-67) 121/58  SpO2:  [94 %-100 %] 100 %      Physical Exam:    Gen: somnolent  Chest: good air entry CTABL  CVS: normal s1 and s2 RR  Abd: NABS soft NT ND  Neuro: non-focal, drowsy  Skin: decub dressing CDI  Ext: no edema in bilateral LE      Diagnostic Data:    Labs:  Recent Labs   Lab 24  0603 24  0639 08/15/24  0521 24  0617 24  1517 24  0654 24  0652 24  1247   WBC 2.5*   < > 6.0 6.4  --  7.5 7.6 8.0   HGB 8.3*   < > 7.8* 6.1* 8.9* 8.0* 9.4* 8.4*   MCV 98.5   < > 98.8 100.0  --  95.9 99.1 98.3   PLT 91.0*   < > 207.0 263.0  --  394.0 554.0* 675.0*   BAND 1  --  1  --   --   --   --   --     < > = values in this interval not displayed.       Recent Labs   Lab 24  0617 24  0654 24  0652 24  1247   * 93 65* 81   BUN 61* 62* 60* 70*   CREATSERUM 2.36* 2.20* 2.22* 1.89*   CA 8.5* 9.5 10.0 9.5   ALB 3.1*  --  3.9 3.7   * 136 136 139   K 4.4 4.8 5.3* 5.8*    106 105 107   CO2 21.0 21.0 21.0 24.0       Estimated Creatinine Clearance: 21.1 mL/min (A) (based on SCr of 1.89 mg/dL (H)).    No results for input(s): \"PTP\", \"INR\" in the last 168 hours.           Imaging: Imaging data reviewed in Epic.    Medications:    acetaminophen  650 mg Oral Q8H    patiromer  8.4 g Oral Once     sacubitril-valsartan  1 tablet Oral BID    bumetanide  1 mg Oral Daily    metoprolol succinate  25 mg Oral Daily Beta Blocker    midodrine  5 mg Oral TID    levETIRAcetam  500 mg Intravenous Q24H    cefTRIAXone  1 g Intravenous Q24H    sodium hypochlorite   Topical BID    amiodarone  200 mg Oral Daily    cyclobenzaprine  10 mg Oral Nightly    fentaNYL  1 patch Transdermal Q72H    ferrous sulfate  325 mg Oral Daily with breakfast    folic acid  800 mcg Oral Daily    gabapentin  300 mg Oral TID    [Held by provider] isosorbide dinitrate  20 mg Oral TID (Nitrates)    pantoprazole  40 mg Oral BID AC       Assessment & Plan:     AMS -> intermittent   Unclear etiology, consider metabolic encephalopathy  Neurology following, EEG WNL  Keppra resumed  CT head non-acute  Abg reviewed  Sacral decubitus ulcer  Gsx on consult  Now s/p Excision of cyst, sharp excisional debridement of buttock decubitus ulcer POD#6  PRN pain control  Continue abx  UTI  UA reviewed  Ucx pending -> e. coli  Continue IV abx  Deescalate as indicated  NICM, HFrEF  Hx of paroxysmal afib  Hx of BRANDYN occlusion  AV paced  Continue home meds  CKD 3  Monitor renal function  Avoid nephrotoxic agents  Pancytopenia  Hgb stable today  Cont to monitor     Plan of care discussed with RN at bedside       Goals of care: Palliative care has been consulted, discussions on going regarding DC plan, daughter will discuss with family before making a decision on Hospice care. Otherwise will dc with home palliative care.     Supplementary Documentation:     Quality:  DVT Prophylaxis: per surgery  CODE status: DNAR/Select      Estimated date of discharge: TBD  Discharge is dependent on: clinical stability  At this point Ms. Silverio is expected to be discharge to: TBD    MDM: High

## 2024-08-20 PROBLEM — R52 PAIN: Status: ACTIVE | Noted: 2024-01-01

## 2024-08-20 PROBLEM — R52 PAIN: Status: ACTIVE | Noted: 2024-08-20

## 2024-08-20 LAB
ALBUMIN SERPL-MCNC: 3.4 G/DL (ref 3.2–4.8)
ANION GAP SERPL CALC-SCNC: 8 MMOL/L (ref 0–18)
BASOPHILS # BLD: 0.25 X10(3) UL (ref 0–0.2)
BASOPHILS NFR BLD: 3 %
BUN BLD-MCNC: 59 MG/DL (ref 9–23)
BUN/CREAT SERPL: 37.8 (ref 10–20)
CALCIUM BLD-MCNC: 9.1 MG/DL (ref 8.7–10.4)
CHLORIDE SERPL-SCNC: 104 MMOL/L (ref 98–112)
CO2 SERPL-SCNC: 22 MMOL/L (ref 21–32)
CREAT BLD-MCNC: 1.56 MG/DL
DEPRECATED RDW RBC AUTO: 65.6 FL (ref 35.1–46.3)
EGFRCR SERPLBLD CKD-EPI 2021: 34 ML/MIN/1.73M2 (ref 60–?)
EOSINOPHIL # BLD: 0.17 X10(3) UL (ref 0–0.7)
EOSINOPHIL NFR BLD: 2 %
ERYTHROCYTE [DISTWIDTH] IN BLOOD BY AUTOMATED COUNT: 19.5 % (ref 11–15)
GLUCOSE BLD-MCNC: 100 MG/DL (ref 70–99)
GLUCOSE BLDC GLUCOMTR-MCNC: 102 MG/DL (ref 70–99)
GLUCOSE BLDC GLUCOMTR-MCNC: 123 MG/DL (ref 70–99)
GLUCOSE BLDC GLUCOMTR-MCNC: 81 MG/DL (ref 70–99)
GLUCOSE BLDC GLUCOMTR-MCNC: 85 MG/DL (ref 70–99)
HCT VFR BLD AUTO: 26 %
HGB BLD-MCNC: 8.1 G/DL
LYMPHOCYTES NFR BLD: 0.42 X10(3) UL (ref 1–4)
LYMPHOCYTES NFR BLD: 5 %
MCH RBC QN AUTO: 29.7 PG (ref 26–34)
MCHC RBC AUTO-ENTMCNC: 31.2 G/DL (ref 31–37)
MCV RBC AUTO: 95.2 FL
METAMYELOCYTES # BLD: 0.25 X10(3) UL
METAMYELOCYTES NFR BLD: 3 %
MONOCYTES # BLD: 0.83 X10(3) UL (ref 0.1–1)
MONOCYTES NFR BLD: 10 %
NEUTROPHILS # BLD AUTO: 5.13 X10 (3) UL (ref 1.5–7.7)
NEUTROPHILS NFR BLD: 76 %
NEUTS BAND NFR BLD: 1 %
NEUTS HYPERSEG # BLD: 6.39 X10(3) UL (ref 1.5–7.7)
NRBC BLD MANUAL-RTO: 1 %
OSMOLALITY SERPL CALC.SUM OF ELEC: 295 MOSM/KG (ref 275–295)
PHOSPHATE SERPL-MCNC: 3.3 MG/DL (ref 2.4–5.1)
PLATELET # BLD AUTO: 712 10(3)UL (ref 150–450)
PLATELET MORPHOLOGY: NORMAL
POTASSIUM SERPL-SCNC: 4.6 MMOL/L (ref 3.5–5.1)
RBC # BLD AUTO: 2.73 X10(6)UL
SODIUM SERPL-SCNC: 134 MMOL/L (ref 136–145)
TOTAL CELLS COUNTED BLD: 100
WBC # BLD AUTO: 8.3 X10(3) UL (ref 4–11)

## 2024-08-20 PROCEDURE — 99233 SBSQ HOSP IP/OBS HIGH 50: CPT | Performed by: HOSPITALIST

## 2024-08-20 PROCEDURE — 99231 SBSQ HOSP IP/OBS SF/LOW 25: CPT | Performed by: NURSE PRACTITIONER

## 2024-08-20 RX ORDER — HYDROMORPHONE HYDROCHLORIDE 1 MG/ML
0.2 INJECTION, SOLUTION INTRAMUSCULAR; INTRAVENOUS; SUBCUTANEOUS EVERY 4 HOURS PRN
Status: DISCONTINUED | OUTPATIENT
Start: 2024-08-20 | End: 2024-09-10

## 2024-08-20 NOTE — PROGRESS NOTES
Progress Note  Margaret Silverio Patient Status:  Inpatient    3/14/1945 MRN [id number]   Location [facility] 4W/SW/SE Attending Fernando Galan MD   Hosp Day # 8 PCP Johnathon Rodriguez, DO     Subjective:  Pt is more lethargic this am - minimal verbal interaction with me this am, which is a decline from previous exam. Mental status has waxed and waned this admit. States she is \"so tired\". Reviewed with RN - pt did receive dilaudid overnight for pain and has fentanyl patch.     Objective:  /62 (BP Location: Right arm)   Pulse 60   Temp 97.5 °F (36.4 °C) (Axillary)   Resp 16   Wt 120 lb (54.4 kg)   SpO2 100%   BMI 20.60 kg/m²       Intake/Output:    Intake/Output Summary (Last 24 hours) at 2024 1013  Last data filed at 2024 0835  Gross per 24 hour   Intake --   Output 360 ml   Net -360 ml       Last 3 Weights   24 1511 120 lb (54.4 kg)   24 0859 120 lb (54.4 kg)   24 1100 112 lb 1.6 oz (50.8 kg)   24 0355 115 lb 3.2 oz (52.3 kg)   24 0620 109 lb 6.4 oz (49.6 kg)   24 0450 112 lb 11.2 oz (51.1 kg)   24 0458 113 lb 9.6 oz (51.5 kg)   24 0508 117 lb 14.4 oz (53.5 kg)   24 0614 123 lb 12.8 oz (56.2 kg)   07/15/24 1830 124 lb 1.6 oz (56.3 kg)       Labs:  Recent Labs   Lab 24  0652 24  1247 24  0626   GLU 65* 81 100*   BUN 60* 70* 59*   CREATSERUM 2.22* 1.89* 1.56*   EGFRCR 22* 27* 34*   CA 10.0 9.5 9.1    139 134*   K 5.3* 5.8* 4.6    107 104   CO2 21.0 24.0 22.0     Recent Labs   Lab 24  0652 24  1247 24  0626   RBC 3.22* 2.88* 2.73*   HGB 9.4* 8.4* 8.1*   HCT 31.9* 28.3* 26.0*   MCV 99.1 98.3 95.2   MCH 29.2 29.2 29.7   MCHC 29.5* 29.7* 31.2   RDW 19.7* 19.7* 19.5*   NEPRELIM 5.19 4.79 5.13   WBC 7.6 8.0 8.3   .0* 675.0* 712.0*         No results for input(s): \"TROP\", \"TROPHS\", \"CK\" in the last 168 hours.    Diagnostics:  No results found.   ROS    Physical Exam:    Gen:  lerhargic, ill-appearing, cachectic  Heent: pupils equal, reactive. Mucous membranes moist.   Neck: no jvd  Cardiac: regular rate and rhythm, normal S1,S2, LLSB 2/6 murmur, no clicks, rub or gallop  Lungs: coarse, diminished  Abd: soft, NT/ND +bs  Ext: no edema  Skin: Warm, dry  Neuro: No focal deficits      Medications:     acetaminophen  650 mg Oral Q8H    patiromer  8.4 g Oral Once    sacubitril-valsartan  1 tablet Oral BID    bumetanide  1 mg Oral Daily    metoprolol succinate  25 mg Oral Daily Beta Blocker    midodrine  5 mg Oral TID    levETIRAcetam  500 mg Intravenous Q24H    cefTRIAXone  1 g Intravenous Q24H    sodium hypochlorite   Topical BID    amiodarone  200 mg Oral Daily    cyclobenzaprine  10 mg Oral Nightly    fentaNYL  1 patch Transdermal Q72H    ferrous sulfate  325 mg Oral Daily with breakfast    folic acid  800 mcg Oral Daily    gabapentin  300 mg Oral TID    [Held by provider] isosorbide dinitrate  20 mg Oral TID (Nitrates)    pantoprazole  40 mg Oral BID AC      dextrose 50 mL/hr at 08/19/24 1845       Assessment:  Sacral decubitus ulcer s/p excision of cyst, excisional debridement 8/13  Mgmt per surgery  Chronic HFrEF, NICM, Severe Wide-Open TR  Echo 6/2024 - LVEF 30-35%, dyskinesis of anteroseptal, anterior walls, biatrial dilation, mild AVS, wide open TR, PASP 48mmHg  LH 2022 w/non obs disease  GDMT - multiple doses have been held d/t lethargy, low BP during admit  Coreg now changed to toprol d/t lower BP  Entresto decreased dosing to 24/26 po BID  Spironolactone 25mg po daily - on hold d/t GFR  Farxiga 10mg po daily - on hold  Isordil 20mg po TID - on hold  Bumex 1mg po daily   Appears fairly compensated on exam  Hypotension - improved; midodrine 5mg po TID  Altered Mental Status, Toxic Metabolic vs Infectious  Pt is more lethargic again today  EEG w/o seizure activity - on Kera  Neuro following  GEORGE on CKD  BUN/Cr downtrending  Acute on Chronic Anemia  Hgb stable  Rec'd PRBC on 8/16 for  Hgb 6.1  UTI  Urine culture +E Coli   On IV antibx  Paroxysmal Atrial Fibrillation s/p [place] Dual Chamber PPM/ICD - 9/2023  On amiodarone 200mg po daily  Formerly on Eliquis - no longer on AC d/t Hx severe GIB  Hx Bioprosthetic Mitral Valve Replacement  Severe RA - methotrexate at home  Hx Recurrent GIB, AVM s/p clip - 4/2023    Plan:  Pt is more lethargic this am - likely will not tolerate po meds at this time  Palliative care team consult appreciated - ongoing GOC discussion. Family reported to be considering hospice but has not yet decided.     Plan of care discussed with patient, RN.    Carole Shaw, KODI  8/20/2024  10:13 AM  [phone] [facility]  [phone] [facility]        Cardiologist Addendum:  Margaret Silverio was seen and examined independently and I agree with the above documentation provided by KRYSTAL Alvarenga.  Patient is open more awake and interactive this morning when compared to yesterday but remains lethargic.  Vitals are within desirable range.  Once able to tolerate oral tablets then resumeamiodarone, Bumex, metoprolol, Entresto.  Currently on midodrine, hold nitrates.    Thank you for allowing me to take part in the care of Margaretbasil Silverio. Please call with any questions of concerns.    L3    Adriana Myers DO  [facility]   Interventional Cardiac and Vascular Services      August 20, 2024  1:35 PM

## 2024-08-20 NOTE — PALLIATIVE CARE NOTE
[facility]  part of [facility]  Palliative Care Progress Note    Margaret Silverio Patient Status:  Inpatient    3/14/1945 MRN [id number]   Location [facility] 4W/SW/SE Attending Fernando Galan MD   Hosp Day # 8 PCP Johnathon Rodriguez,      The  Cures Act makes medical notes like these available to patients in the interest of transparency. Please be advised this is a medical document. Medical documents are intended to carry relevant information, facts as evident, and the clinical opinion of the practitioner. The medical note is intended as peer to peer communication and may appear blunt or direct. It is written in medical language and may contain abbreviations or verbiage that are unfamiliar.     Subjective     When I entered the room, the patient was in the bed, the room was dark and she is resting comfortably, she is not responsive tries to open her eyes. There were no family present at bedside.     Review of pertinent medication requirements in past 24 hours: IV Tylenol X1, Dilaudid 0.4mg ivp X2, Fentanyl patch 12mcg    Palliative Care symptom needs assessed:     Unable to obtain ROS due to Margaret is not responsive today.     Allergies:  Allergies   Allergen Reactions    Lisinopril Coughing       Medications:     Current Facility-Administered Medications:     HYDROmorphone (Dilaudid) 1 MG/ML injection 0.2 mg, 0.2 mg, Intravenous, Q4H PRN **OR** [DISCONTINUED] HYDROmorphone (Dilaudid) 1 MG/ML injection 0.4 mg, 0.4 mg, Intravenous, Q3H PRN **OR** [DISCONTINUED] HYDROmorphone (Dilaudid) 1 MG/ML injection 0.8 mg, 0.8 mg, Intravenous, Q3H PRN    acetaminophen (Tylenol) 160 MG/5ML oral liquid 650 mg, 650 mg, Oral, Q8H    senna-docusate (Senokot-S) 8.6-50 MG per tab 2 tablet, 2 tablet, Oral, Daily PRN    polyethylene glycol (PEG 3350) (Miralax) 17 g oral packet 17 g, 17 g, Oral, Daily PRN    patiromer (Veltassa) 8.4 g oral packet 8.4 g, 8.4 g, Oral, Once    dextrose  5% infusion, , Intravenous, Continuous    glucose (Dex4) 15 GM/59ML oral liquid 15 g, 15 g, Oral, Q15 Min PRN **OR** glucose (Glutose) 40% oral gel 15 g, 15 g, Oral, Q15 Min PRN **OR** glucose-vitamin C (Dex-4) chewable tab 4 tablet, 4 tablet, Oral, Q15 Min PRN **OR** dextrose 50% injection 50 mL, 50 mL, Intravenous, Q15 Min PRN **OR** glucose (Dex4) 15 GM/59ML oral liquid 30 g, 30 g, Oral, Q15 Min PRN **OR** glucose (Glutose) 40% oral gel 30 g, 30 g, Oral, Q15 Min PRN **OR** glucose-vitamin C (Dex-4) chewable tab 8 tablet, 8 tablet, Oral, Q15 Min PRN    traMADol (Ultram) tab 50 mg, 50 mg, Oral, Q12H PRN    sacubitril-valsartan (Entresto) 24-26 MG per tab 1 tablet, 1 tablet, Oral, BID    bumetanide (Bumex) tab 1 mg, 1 mg, Oral, Daily    metoprolol succinate ER (Toprol XL) 24 hr tab 25 mg, 25 mg, Oral, Daily Beta Blocker    midodrine (ProAmatine) tab 5 mg, 5 mg, Oral, TID    levETIRAcetam (Keppra) 500 mg/5mL injection 500 mg, 500 mg, Intravenous, Q24H    acetaminophen (Ofirmev) 10 mg/mL infusion premix 1,000 mg, 1,000 mg, Intravenous, Q6H PRN    ondansetron (Zofran) 4 MG/2ML injection 4 mg, 4 mg, Intravenous, Q6H PRN    metoclopramide (Reglan) 5 mg/mL injection 5 mg, 5 mg, Intravenous, Q8H PRN    cefTRIAXone (Rocephin) 1 g in sodium chloride 0.9% 100 mL IVPB-ADDV, 1 g, Intravenous, Q24H    sodium hypochlorite (Dakin's) 0.125 % external solution, , Topical, BID    acetaminophen (Tylenol Extra Strength) tab 500 mg, 500 mg, Oral, Q4H PRN    amiodarone (Pacerone) tab 200 mg, 200 mg, Oral, Daily    cyclobenzaprine (Flexeril) tab 10 mg, 10 mg, Oral, Nightly    fentaNYL (Duragesic) 12 MCG/HR patch 1 patch, 1 patch, Transdermal, Q72H    ferrous sulfate DR tab 325 mg, 325 mg, Oral, Daily with breakfast    folic acid (Folvite) tab 800 mcg, 800 mcg, Oral, Daily    gabapentin (Neurontin) cap 300 mg, 300 mg, Oral, TID    HYDROcodone-acetaminophen (Norco)  MG per tab 1 tablet, 1 tablet, Oral, Q8H PRN    [Held by provider]  isosorbide dinitrate (Isordil) tab 20 mg, 20 mg, Oral, TID (Nitrates)    pantoprazole (Protonix) DR tab 40 mg, 40 mg, Oral, BID AC    Objective     Vital Signs:  Blood pressure 131/62, pulse 60, temperature 97.5 °F (36.4 °C), temperature source Axillary, resp. rate 16, weight 120 lb (54.4 kg), SpO2 100%.  Body mass index is 20.6 kg/m².  Present Level of pain: HONG  Non-verbal signs of pain present: No    Physical Exam:  General: Margaret is in the bed she is not responsive today on exam, she appears elderly, cachetic and frail.   HEENT: dry oral MM  Cardiac: + murmer regular rate and rhythm, S1, S2 normal, no rub or gallop.  Lungs: + bilateral rales without wheezes.  Normal excursions and effort.2 liters NC  Abdomen: Soft, flat non-tender, + bowel sounds, no rebound or guarding, + BM  Extremities: Without clubbing, cyanosis. BLE Edema not present  Neurologic: drowsy but arousable, flat affect  Skin: Warm and dry.  + sacral wound see wound care note     Prior to admission Palliative performance scale PPSv2 (%): 30    Hematology:  Lab Results   Component Value Date    WBC 8.3 08/20/2024    HGB 8.1 (L) 08/20/2024    HCT 26.0 (L) 08/20/2024    .0 (H) 08/20/2024       Coags:  Lab Results   Component Value Date    INR 1.25 (H) 02/09/2024    PTT 35.2 02/09/2024       Chemistry:  Lab Results   Component Value Date    CREATSERUM 1.56 (H) 08/20/2024    BUN 59 (H) 08/20/2024     (L) 08/20/2024    K 4.6 08/20/2024     08/20/2024    CO2 22.0 08/20/2024     (H) 08/20/2024    CA 9.1 08/20/2024    ALB 3.4 08/20/2024    ALKPHO 328 (H) 06/18/2024    BILT 0.8 06/18/2024    TP 7.7 06/18/2024    AST 23 06/18/2024    ALT 11 06/18/2024    MG 2.8 (H) 08/17/2024    PHOS 3.3 08/20/2024       Imaging:  XR CHEST AP PORTABLE  (CPT=71045)    Result Date: 8/18/2024  CONCLUSION:   Moderate cardiomegaly with interstitial and alveolar pulmonary edema.  Small to moderate left pleural effusion.  Small loculated right pleural  effusion.  Bibasilar alveolar opacities, thought to relate to alveolar edema.  Other superimposed process not excluded.  Recommend attention on follow-up.     Dictated by (CST): Nadine Blankenship MD on 8/18/2024 at 12:04 PM     Finalized by (CST): Nadine Blankenship MD on 8/18/2024 at 12:07 PM             Summary of Discussion    Followed up with Margaret today, she is not responsive for me on exam, tries to open her eyes when asked. vital signs stable, she did receive 2 doses of the 0.4mg Dilaudid over the last 24 hours. I spoke with her dtr Barbi expressed concern over managing her pain and trying to keep her awake and alert to eat and participate in care.   Barbi stated her mother experienced this last week where she was not responsive.   Rn reported pt was crying overnight due to pain  Will continue with the Fentanyl patch as that is not new. Decrease the Dilaudid to 0.2mg every 4 hours and will monitor. discussed with dtr Barbi.       Assessment and Recommendation      Pressure injury of skin of sacral region, unspecified injury stage    S/P I&D 8/13/24    Pancytopenia    Non ischemic Cardiomyopathy    SSS PPM/AICD    Afib    Urinary tract infection without hematuria,    Anemia    Encephalopathy    CKD    H/o GIB    Frequent hospitalizations    H/o hypotension     Pain-concern of increased drowsiness will monitor pt.   -scheduleTylenol 650mg every 8 hours  -Continue with Fentanyl patch 12mcg  -stop the morphine due to kidney function  -Dilaudid 0.2mg every 4 hours as needed for pain  -continue with gabapentin 300mg TID  -stop the Flexeril nightly  -Norco 10/325 as needed      Poor appetite  -offered Remeron daily for depression and appetite, Barbi stated she will talk with pt and family.     Bowel regimen  -add senna daily as needed  -Miralax daily as needed     Goals of care counseling  -see above for details  -Pt is DNAR/DNI with selective treatment  -Agreeable to palliative care following  -Dispo: TBD.  Dtr is agreeable to CPC. SW to help with dc planning.   - not requested.   -ongoing GOC discussions will be needed over time.  -pts dtr Barbi will talk with family about getting hospice informational session while here.   -Provided emotional support to pt/family who are coping adequately     Advance care planning  -see above for details  -Pt's dtr Barbi Silverio is HCPOA [phone]  -HCPOA and POLST pw are on file in [employer].      Palliative Performance Scale 20%     Discussed today's visit with  Dr Galan and Keyana GIL    Palliative Care Follow Up: Palliative care team will continue to follow.Feel free to contact our team with any questions or concerns.    Thank you for allowing Palliative Care services to participate in the care of Margaret Silverio.    A total of 25 minutes were spent on this follow-up, which included all of the following: chart review, direct face to face contact, history taking, physical examination, counseling and coordinating care, and documentation.     Macy Marrufo, [id number]  8/20/2024  11:16AM  Palliative Care Services

## 2024-08-20 NOTE — SPIRITUAL CARE NOTE
Spiritual Care Visit Note    Patient Name: Margaret Silverio Date of Spiritual Care Visit: 24   : 3/14/1945 Primary Dx: Pressure injury of skin of sacral region, unspecified injury stage       Referred By: Referral From:     Spiritual Care Taxonomy:    Intended Effects: Demonstrate caring and concern    Methods: Offer support    Interventions: Active listening    Visit Type/Summary:    I spoke with patient's RN and patient was sleeping and very lethargic.   available for follow up when family is bedside.    Chaplain Louis [phone]

## 2024-08-20 NOTE — PROGRESS NOTES
[facility]  part of [facility]ist Progress Note     Margaret Silverio Patient Status:  Inpatient    3/14/1945 MRN [id number]   Location [facility] POST ANESTHESIA CARE UNIT Attending Fernando Galan MD   Hosp Day # 8 PCP Johnathon Rodriguez, DO     Subjective:     Pt was seen and examined  Drowsy but arousable  No acute distress noted  Denies any pain        Objective:    Review of Systems:   ROS completed; pertinent positive and negatives stated in subjective.      Vital signs:  Temp:  [97.3 °F (36.3 °C)-98.2 °F (36.8 °C)] 97.5 °F (36.4 °C)  Pulse:  [60-63] 60  Resp:  [16] 16  BP: (100-143)/(56-74) 131/62  SpO2:  [97 %-100 %] 100 %      Physical Exam:    Gen: somnolent  Chest: good air entry CTABL  CVS: normal s1 and s2 RR  Abd: NABS soft NT ND  Neuro: non-focal, drowsy  Skin: decub dressing CDI  Ext: no edema in bilateral LE      Diagnostic Data:    Labs:  Recent Labs   Lab 08/15/24  0521 24  0617 24  1517 24  0654 24  0652 24  1247 24  0626   WBC 6.0 6.4  --  7.5 7.6 8.0 8.3   HGB 7.8* 6.1* 8.9* 8.0* 9.4* 8.4* 8.1*   MCV 98.8 100.0  --  95.9 99.1 98.3 95.2   .0 263.0  --  394.0 554.0* 675.0* 712.0*   BAND 1  --   --   --   --  2 1       Recent Labs   Lab 24  0652 24  1247 24  0626   GLU 65* 81 100*   BUN 60* 70* 59*   CREATSERUM 2.22* 1.89* 1.56*   CA 10.0 9.5 9.1   ALB 3.9 3.7 3.4    139 134*   K 5.3* 5.8* 4.6    107 104   CO2 21.0 24.0 22.0       Estimated Creatinine Clearance: 25.5 mL/min (A) (based on SCr of 1.56 mg/dL (H)).    No results for input(s): \"PTP\", \"INR\" in the last 168 hours.           Imaging: Imaging data reviewed in Epic.    Medications:    acetaminophen  650 mg Oral Q8H    patiromer  8.4 g Oral Once    sacubitril-valsartan  1 tablet Oral BID    bumetanide  1 mg Oral Daily    metoprolol succinate  25 mg Oral Daily Beta Blocker    midodrine  5 mg Oral TID    levETIRAcetam   500 mg Intravenous Q24H    cefTRIAXone  1 g Intravenous Q24H    sodium hypochlorite   Topical BID    amiodarone  200 mg Oral Daily    fentaNYL  1 patch Transdermal Q72H    ferrous sulfate  325 mg Oral Daily with breakfast    folic acid  800 mcg Oral Daily    gabapentin  300 mg Oral TID    [Held by provider] isosorbide dinitrate  20 mg Oral TID (Nitrates)    pantoprazole  40 mg Oral BID AC       Assessment & Plan:     AMS -> intermittent   Unclear etiology, consider metabolic encephalopathy  Neurology following, EEG WNL  Keppra resumed  CT head non-acute  Abg reviewed  Sacral decubitus ulcer  Gsx on consult  Now s/p Excision of cyst, sharp excisional debridement of buttock decubitus ulcer POD#7  PRN pain control  Continue abx  UTI  UA reviewed  Ucx pending -> e. coli  Continue IV abx  Deescalate as indicated  NICM, HFrEF  Hx of paroxysmal afib  Hx of BRANDYN occlusion  AV paced  Continue home meds  CKD 3  Monitor renal function  Avoid nephrotoxic agents  Pancytopenia  Hgb stable today  Cont to monitor     Plan of care discussed with RN at bedside       Goals of care: Palliative care has been consulted, discussions on going regarding DC plan, daughter will discuss with family before making a decision on Hospice care. Otherwise will dc with home palliative care.     Supplementary Documentation:     Quality:  DVT Prophylaxis: per surgery  CODE status: DNAR/Select      Estimated date of discharge: TBD  Discharge is dependent on: clinical stability  At this point Ms. Silverio is expected to be discharge to: TBD    MDM: High

## 2024-08-20 NOTE — PLAN OF CARE
Patient is A&Ox3, drowsy at times. Fentanyl patch on Chest. On 2L NC. IVF infusing. Q6 accuchecks. Daily weight. Decreased appetite. Voiding via purewick. Q2 turns. Wounds changed and cleansed. Max assist. Call light within reach, frequent rounding. Safety measures in place. Plan TBD.       Problem: Patient Centered Care  Goal: Patient preferences are identified and integrated in the patient's plan of care  Description: Interventions:  - What would you like us to know as we care for you?   - Provide timely, complete, and accurate information to patient/family  - Incorporate patient and family knowledge, values, beliefs, and cultural backgrounds into the planning and delivery of care  - Encourage patient/family to participate in care and decision-making at the level they choose  - Honor patient and family perspectives and choices  Outcome: Progressing     Problem: Patient/Family Goals  Goal: Patient/Family Long Term Goal  Description: Patient's Long Term Goal:     Interventions:  -   - See additional Care Plan goals for specific interventions  Outcome: Progressing  Goal: Patient/Family Short Term Goal  Description: Patient's Short Term Goal:    Interventions:   -   - See additional Care Plan goals for specific interventions  Outcome: Progressing     Problem: PAIN - ADULT  Goal: Verbalizes/displays adequate comfort level or patient's stated pain goal  Description: INTERVENTIONS:  - Encourage pt to monitor pain and request assistance  - Assess pain using appropriate pain scale  - Administer analgesics based on type and severity of pain and evaluate response  - Implement non-pharmacological measures as appropriate and evaluate response  - Consider cultural and social influences on pain and pain management  - Manage/alleviate anxiety  - Utilize distraction and/or relaxation techniques  - Monitor for opioid side effects  - Notify MD/LIP if interventions unsuccessful or patient reports new pain  - Anticipate increased pain  with activity and pre-medicate as appropriate  Outcome: Progressing     Problem: RISK FOR INFECTION - ADULT  Goal: Absence of fever/infection during anticipated neutropenic period  Description: INTERVENTIONS  - Monitor WBC  - Administer growth factors as ordered  - Implement neutropenic guidelines  Outcome: Progressing     Problem: SAFETY ADULT - FALL  Goal: Free from fall injury  Description: INTERVENTIONS:  - Assess pt frequently for physical needs  - Identify cognitive and physical deficits and behaviors that affect risk of falls.  - [place] fall precautions as indicated by assessment.  - Educate pt/family on patient safety including physical limitations  - Instruct pt to call for assistance with activity based on assessment  - Modify environment to reduce risk of injury  - Provide assistive devices as appropriate  - Consider OT/PT consult to assist with strengthening/mobility  - Encourage toileting schedule  Outcome: Progressing     Problem: SKIN/TISSUE INTEGRITY - ADULT  Goal: Skin integrity remains intact  Description: INTERVENTIONS  - Assess and document risk factors for pressure ulcer development  - Assess and document skin integrity  - Monitor for areas of redness and/or skin breakdown  - Initiate interventions, skin care algorithm/standards of care as needed  Outcome: Progressing  Goal: Incision(s), wounds(s) or drain site(s) healing without S/S of infection  Description: INTERVENTIONS:  - Assess and document risk factors for pressure ulcer development  - Assess and document skin integrity  - Assess and document dressing/incision, wound bed, drain sites and surrounding tissue  - Implement wound care per orders  - Initiate isolation precautions as appropriate  - Initiate Pressure Ulcer prevention bundle as indicated  Outcome: Progressing  Goal: Oral mucous membranes remain intact  Description: INTERVENTIONS  - Assess oral mucosa and hygiene practices  - Implement preventative oral hygiene regimen  -  Implement oral medicated treatments as ordered  Outcome: Progressing     Problem: Delirium  Goal: Minimize duration of delirium  Description: Interventions:  - Encourage use of hearing aids, eye glasses  - Promote highest level of mobility daily  - Provide frequent reorientation  - Promote wakefulness i.e. lights on, blinds open  - Promote sleep, encourage patient's normal rest cycle i.e. lights off, TV off, minimize noise and interruptions  - Encourage family to assist in orientation and promotion of home routines  Outcome: Progressing

## 2024-08-20 NOTE — PLAN OF CARE
1745: pt hypoglycemic upon blood sugar check. Blood sugar 64. Pt has not eaten much all day. Dextrose 15 gm given po per protocol, blood sugar re-checked and was 56.  Dr. Wilhelm aware of all of the above, new orders rec'd to start pt. On d5W at 50ml/hr and check blood sugar q6H, next due at 0000.   Endorsed to night shift RN.

## 2024-08-21 ENCOUNTER — APPOINTMENT (OUTPATIENT)
Dept: GENERAL RADIOLOGY | Facility: HOSPITAL | Age: 79
DRG: 853 | End: 2024-08-21
Attending: INTERNAL MEDICINE
Payer: MEDICARE

## 2024-08-21 LAB
ALBUMIN SERPL-MCNC: 3.4 G/DL (ref 3.2–4.8)
ANION GAP SERPL CALC-SCNC: 8 MMOL/L (ref 0–18)
BASOPHILS # BLD: 0 X10(3) UL (ref 0–0.2)
BASOPHILS NFR BLD: 0 %
BUN BLD-MCNC: 48 MG/DL (ref 9–23)
BUN/CREAT SERPL: 33.6 (ref 10–20)
CALCIUM BLD-MCNC: 9.3 MG/DL (ref 8.7–10.4)
CHLORIDE SERPL-SCNC: 102 MMOL/L (ref 98–112)
CO2 SERPL-SCNC: 22 MMOL/L (ref 21–32)
CREAT BLD-MCNC: 1.43 MG/DL
DEPRECATED RDW RBC AUTO: 67.3 FL (ref 35.1–46.3)
EGFRCR SERPLBLD CKD-EPI 2021: 38 ML/MIN/1.73M2 (ref 60–?)
EOSINOPHIL # BLD: 0.17 X10(3) UL (ref 0–0.7)
EOSINOPHIL NFR BLD: 2 %
ERYTHROCYTE [DISTWIDTH] IN BLOOD BY AUTOMATED COUNT: 19.8 % (ref 11–15)
GLUCOSE BLD-MCNC: 93 MG/DL (ref 70–99)
GLUCOSE BLDC GLUCOMTR-MCNC: 104 MG/DL (ref 70–99)
GLUCOSE BLDC GLUCOMTR-MCNC: 106 MG/DL (ref 70–99)
GLUCOSE BLDC GLUCOMTR-MCNC: 117 MG/DL (ref 70–99)
GLUCOSE BLDC GLUCOMTR-MCNC: 88 MG/DL (ref 70–99)
GLUCOSE BLDC GLUCOMTR-MCNC: 95 MG/DL (ref 70–99)
HCT VFR BLD AUTO: 31.1 %
HGB BLD-MCNC: 9.5 G/DL
LYMPHOCYTES NFR BLD: 0.77 X10(3) UL (ref 1–4)
LYMPHOCYTES NFR BLD: 9 %
MAGNESIUM SERPL-MCNC: 2.1 MG/DL (ref 1.6–2.6)
MCH RBC QN AUTO: 29.9 PG (ref 26–34)
MCHC RBC AUTO-ENTMCNC: 30.5 G/DL (ref 31–37)
MCV RBC AUTO: 97.8 FL
METAMYELOCYTES # BLD: 0.26 X10(3) UL
METAMYELOCYTES NFR BLD: 3 %
MONOCYTES # BLD: 2.04 X10(3) UL (ref 0.1–1)
MONOCYTES NFR BLD: 24 %
MYELOCYTES # BLD: 0.26 X10(3) UL
MYELOCYTES NFR BLD: 3 %
NEUTROPHILS # BLD AUTO: 4.44 X10 (3) UL (ref 1.5–7.7)
NEUTROPHILS NFR BLD: 57 %
NEUTS BAND NFR BLD: 2 %
NEUTS HYPERSEG # BLD: 5.02 X10(3) UL (ref 1.5–7.7)
OSMOLALITY SERPL CALC.SUM OF ELEC: 286 MOSM/KG (ref 275–295)
PHOSPHATE SERPL-MCNC: 2.9 MG/DL (ref 2.4–5.1)
PLATELET # BLD AUTO: 671 10(3)UL (ref 150–450)
POTASSIUM SERPL-SCNC: 5.1 MMOL/L (ref 3.5–5.1)
RBC # BLD AUTO: 3.18 X10(6)UL
SODIUM SERPL-SCNC: 132 MMOL/L (ref 136–145)
TOTAL CELLS COUNTED BLD: 100
WBC # BLD AUTO: 8.5 X10(3) UL (ref 4–11)

## 2024-08-21 PROCEDURE — 71045 X-RAY EXAM CHEST 1 VIEW: CPT | Performed by: INTERNAL MEDICINE

## 2024-08-21 PROCEDURE — 99231 SBSQ HOSP IP/OBS SF/LOW 25: CPT | Performed by: NURSE PRACTITIONER

## 2024-08-21 PROCEDURE — 99233 SBSQ HOSP IP/OBS HIGH 50: CPT | Performed by: INTERNAL MEDICINE

## 2024-08-21 RX ORDER — HEPARIN SODIUM 5000 [USP'U]/ML
5000 INJECTION, SOLUTION INTRAVENOUS; SUBCUTANEOUS EVERY 8 HOURS SCHEDULED
Status: DISCONTINUED | OUTPATIENT
Start: 2024-08-21 | End: 2024-09-10

## 2024-08-21 NOTE — PLAN OF CARE
Patient is A&Ox3, drowsy at times. Fentanyl patch on Chest. On !L NC. IVF infusing. Q6 accuchecks. Daily weight. Decreased appetite. Voiding via purewick. Q2 turns. Wounds changed and cleansed. Max assist. Call light within reach, frequent rounding. Safety measures in place. Plan TBD.       Problem: Patient Centered Care  Goal: Patient preferences are identified and integrated in the patient's plan of care  Description: Interventions:  - What would you like us to know as we care     - Provide timely, complete, and accurate information to patient/family  - Incorporate patient and family knowledge, values, beliefs, and cultural backgrounds into the planning and delivery of care  - Encourage patient/family to participate in care and decision-making at the level they choose  - Honor patient and family perspectives and choices  Outcome: Progressing     Problem: Patient/Family Goals  Goal: Patient/Family Long Term Goal  Description: Patient's Long Term Goal:     Interventions:  -   - See additional Care Plan goals for specific interventions  Outcome: Progressing  Goal: Patient/Family Short Term Goal  Description: Patient's Short Term Goal:     Interventions:   -   - See additional Care Plan goals for specific interventions  Outcome: Progressing     Problem: PAIN - ADULT  Goal: Verbalizes/displays adequate comfort level or patient's stated pain goal  Description: INTERVENTIONS:  - Encourage pt to monitor pain and request assistance  - Assess pain using appropriate pain scale  - Administer analgesics based on type and severity of pain and evaluate response  - Implement non-pharmacological measures as appropriate and evaluate response  - Consider cultural and social influences on pain and pain management  - Manage/alleviate anxiety  - Utilize distraction and/or relaxation techniques  - Monitor for opioid side effects  - Notify MD/LIP if interventions unsuccessful or patient reports new pain  - Anticipate increased pain with  activity and pre-medicate as appropriate  Outcome: Progressing     Problem: RISK FOR INFECTION - ADULT  Goal: Absence of fever/infection during anticipated neutropenic period  Description: INTERVENTIONS  - Monitor WBC  - Administer growth factors as ordered  - Implement neutropenic guidelines  Outcome: Progressing     Problem: SAFETY ADULT - FALL  Goal: Free from fall injury  Description: INTERVENTIONS:  - Assess pt frequently for physical needs  - Identify cognitive and physical deficits and behaviors that affect risk of falls.  - Axis fall precautions as indicated by assessment.  - Educate pt/family on patient safety including physical limitations  - Instruct pt to call for assistance with activity based on assessment  - Modify environment to reduce risk of injury  - Provide assistive devices as appropriate  - Consider OT/PT consult to assist with strengthening/mobility  - Encourage toileting schedule  Outcome: Progressing     Problem: SKIN/TISSUE INTEGRITY - ADULT  Goal: Skin integrity remains intact  Description: INTERVENTIONS  - Assess and document risk factors for pressure ulcer development  - Assess and document skin integrity  - Monitor for areas of redness and/or skin breakdown  - Initiate interventions, skin care algorithm/standards of care as needed  Outcome: Progressing  Goal: Incision(s), wounds(s) or drain site(s) healing without S/S of infection  Description: INTERVENTIONS:  - Assess and document risk factors for pressure ulcer development  - Assess and document skin integrity  - Assess and document dressing/incision, wound bed, drain sites and surrounding tissue  - Implement wound care per orders  - Initiate isolation precautions as appropriate  - Initiate Pressure Ulcer prevention bundle as indicated  Outcome: Progressing  Goal: Oral mucous membranes remain intact  Description: INTERVENTIONS  - Assess oral mucosa and hygiene practices  - Implement preventative oral hygiene regimen  - Implement  oral medicated treatments as ordered  Outcome: Progressing     Problem: Delirium  Goal: Minimize duration of delirium  Description: Interventions:  - Encourage use of hearing aids, eye glasses  - Promote highest level of mobility daily  - Provide frequent reorientation  - Promote wakefulness i.e. lights on, blinds open  - Promote sleep, encourage patient's normal rest cycle i.e. lights off, TV off, minimize noise and interruptions  - Encourage family to assist in orientation and promotion of home routines  Outcome: Progressing

## 2024-08-21 NOTE — PAYOR COMM NOTE
RECONSIDERATION REQUEST     **As a reminder, Medicare Advantage plans are instructed to provide, at a minimum, the same coverage that a traditional Medicare beneficiary would receive. Beyond any doubt, a traditional Medicare recipient in this same situation would have received Medicare coverage for these inpatient medically necessary services. **      --------------  CONTINUED STAY REVIEW  8/17-8/20  Payor: [employer]  Subscriber #:  [id number]  Authorization Number: [id number]    Admit date: 8/12/24  Admit time:  2:41 PM    REVIEW DOCUMENTATION:    8/17 Cardiology  On 2L NC     Lungs: coarse, diminished       Sacral decubitus ulcer s/p excision of cyst, excisional debridement 8/13  Mgmt per surgery  Chronic HFrEF, NICM, Severe Wide-Open TR  Echo 6/2024 - LVEF 30-35%, dyskinesis of anteroseptal, anterior walls, biatrial dilation, mild AVS, wide open TR, PASP 48mmHg  [facility] 2022 w/non obs disease  GDMT - doses have been held d/t lethargy, low BP  Coreg now changed to toprol d/t lower BP  Entresto 49-51mg po BID - on hold  Spironolactone 25mg po daily - on hold d/t GFR  Farxiga 10mg po daily - on hold d/t GFR  Isordil 20mg po TID - on hold  Bumex 1mg po daily - on hold  Appears compensated on exam   Hypotension - improving; midodrine 5mg po TID  Altered Mental Status, Toxic Metabolic vs Infectious  Improving - drowsy but answered questions appropriately  EEG w/o seizure activity - on Kera  Neuro following  GEORGE on CKD  BUN/Cr elevated above baseline, but downtrending  Bumex on hold   Acute on Chronic Anemia  Hgb 6.1 today - PRBC per primary  PRBC per primary  UTI  Urine culture +E Coli   On IV antibx  Paroxysmal Atrial Fibrillation s/p St Phillip Dual Chamber PPM/ICD - 9/2023  On amiodarone 200mg po daily  Formerly on Eliquis - no longer on AC d/t Hx severe GIB  Hx Bioprosthetic Mitral Valve Replacement  Severe RA - methotrexate at home  Hx Recurrent GIB, AVM s/p clip - 4/2023  Acute on Chronic Anemia  Hgb  6.1 yesterday s/p 1 unit PRBC - recheck 8.9  CBC pending this am     Plan:  CBC, BMP pending this am   Continue amiodarone  Continue toprol (hold for SBP <95)  Decrease entresto to 24/26 po BID (hold for SBP <100)  Resume Bumex 1mg po daily  Continue midodrine 5mg po TID  Hold isordil   Strict I&O, daily weight, daily BMP        8/17 IM    Drowsy today, not following commands       Temp:  [97.4 °F (36.3 °C)-98.1 °F (36.7 °C)] 98.1 °F (36.7 °C)  Pulse:  [59-60] 60  Resp:  [14-16] 14  BP: ()/(53-67) 117/67  SpO2:  [97 %-100 %] 98 %               Recent Labs   Lab 08/13/24  0603 08/14/24  0639 08/15/24  0521 08/16/24  0617 08/16/24  1517 08/17/24  0654   WBC 2.5* 4.2 6.0 6.4  --  7.5   HGB 8.3* 7.9* 7.8* 6.1* 8.9* 8.0*   MCV 98.5 99.6 98.8 100.0  --  95.9   PLT 91.0* 131.0* 207.0 263.0  --  394.0   BAND 1  --  1  --   --   --                Recent Labs   Lab 08/15/24  0521 08/16/24  0617 08/17/24  0654   GLU 83 119* 93   BUN 62* 61* 62*   CREATSERUM 2.68* 2.36* 2.20*   CA 9.5 8.5* 9.5   ALB 3.8 3.1*  --     133* 136   K 4.6 4.4 4.8    103 106   CO2 24.0 21.0 21.0          levETIRAcetam  500 mg Intravenous Q24H    cefTRIAXone  1 g Intravenous Q24H         AMS -> intermittent   Unclear etiology, consider metabolic encephalopathy  Neurology following, EEG WNL  Keppra resumed  CT head non-acute  Abg reviewed  Sacral decubitus ulcer  Gsx on consult  Now s/p Excision of cyst, sharp excisional debridement of buttock decubitus ulcer POD#4  PRN pain control  Continue abx  UTI  UA reviewed  Ucx pending -> e. coli  Continue IV abx  Deescalate as indicated  NICM, HFrEF  Hx of paroxysmal afib  Hx of BRANDYN occlusion  AV paced  Continue home meds  CKD 3  Monitor renal function  Avoid nephrotoxic agents  Pancytopenia  Hgb stable today  Cont to monitor             8/18     Pt drowsy this am but answers my questions.       Lab 08/16/24  0617 08/17/24  0654 08/18/24  0652   * 93 65*   BUN 61* 62* 60*   CREATSERUM  2.36* 2.20* 2.22*   EGFRCR 21* 22* 22*   CA 8.5* 9.5 10.0   * 136 136   K 4.4 4.8 5.3*    106 105   CO2 21.0 21.0 21.0              Recent Labs   Lab 08/15/24  0521 08/16/24  0617 08/16/24  1517 08/17/24  0654 08/18/24  0652   RBC 2.48* 1.95*  --  2.70* 3.22*   HGB 7.8* 6.1* 8.9* 8.0* 9.4*   HCT 24.5* 19.5*  --  25.9* 31.9*   MCV 98.8 100.0  --  95.9 99.1   MCH 31.5 31.3  --  29.6 29.2   MCHC 31.8 31.3  --  30.9* 29.5*   RDW 18.6* 18.6*  --  19.7* 19.7*   NEPRELIM 4.17 5.09  --   --  5.19   WBC 6.0 6.4  --  7.5 7.6   .0 263.0  --  394.0 554.0*       Neck: + jvd  Lungs: coarse, diminished       levETIRAcetam  500 mg Intravenous Q24H    cefTRIAXone  1 g Intravenous Q24H         Sacral decubitus ulcer s/p excision of cyst, excisional debridement 8/13  Mgmt per surgery  Chronic HFrEF, NICM, Severe Wide-Open TR  Echo 6/2024 - LVEF 30-35%, dyskinesis of anteroseptal, anterior walls, biatrial dilation, mild AVS, wide open TR, PASP 48mmHg  [facility] 2022 w/non obs disease  GDMT - doses have been held d/t lethargy, low BP during admit  Coreg now changed to toprol d/t lower BP  Entresto decreased dosing  Spironolactone 25mg po daily - on hold d/t GFR  Farxiga 10mg po daily - on hold  Isordil 20mg po TID - on hold  Bumex 1mg po daily   Appears compensated on exam   Hypotension - improving; midodrine 5mg po TID  Altered Mental Status, Toxic Metabolic vs Infectious  Improving - drowsy but answered questions appropriately  EEG w/o seizure activity - on Keppra  Neuro following  GEORGE on CKD  BUN/Cr elevated above baseline, but downtrending  K 5.3  Acute on Chronic Anemia  Hgb 6.1 today - PRBC per primary  PRBC per primary  UTI  Urine culture +E Coli   On IV antibx  Paroxysmal Atrial Fibrillation s/p St Phillip Dual Chamber PPM/ICD - 9/2023  On amiodarone 200mg po daily  Formerly on Eliquis - no longer on AC d/t Hx severe GIB  Hx Bioprosthetic Mitral Valve Replacement  Severe RA - methotrexate at home  Hx Recurrent GIB, AVM  s/p clip - 4/2023  Acute on Chronic Anemia  Hgb improved s/p PRBC  Hyperkalemia      Plan:  Continue toprol 25mg po daily  Continue midodrine 5mg po TID  Continue bumex - check CXR today  Now on decreased dosing of entresto 24/26 po BID - monitor renal function  Hold isordil  Strict I&O, daily weight, daily BMP            8/18    Still drowsy       Temp:  [97.9 °F (36.6 °C)-98 °F (36.7 °C)] 98 °F (36.7 °C)  Pulse:  [58-61] 59  Resp:  [14-15] 14  BP: (109-129)/(51-96) 129/53  SpO2:  [92 %-99 %] 99 %      Lab 08/13/24  0603 08/14/24  0639 08/15/24  0521 08/16/24  0617 08/16/24  1517 08/17/24  0654 08/18/24  0652   WBC 2.5* 4.2 6.0 6.4  --  7.5 7.6   HGB 8.3* 7.9* 7.8* 6.1* 8.9* 8.0* 9.4*   MCV 98.5 99.6 98.8 100.0  --  95.9 99.1   PLT 91.0* 131.0* 207.0 263.0  --  394.0 554.0*   BAND 1  --  1  --   --   --   --                 Recent Labs   Lab 08/15/24  0521 08/16/24  0617 08/17/24  0654 08/18/24  0652   GLU 83 119* 93 65*   BUN 62* 61* 62* 60*   CREATSERUM 2.68* 2.36* 2.20* 2.22*   CA 9.5 8.5* 9.5 10.0   ALB 3.8 3.1*  --  3.9    133* 136 136   K 4.6 4.4 4.8 5.3*    103 106 105   CO2 24.0 21.0 21.0 21.0          levETIRAcetam  500 mg Intravenous Q24H    cefTRIAXone  1 g Intravenous Q24H         AMS -> intermittent   Unclear etiology, consider metabolic encephalopathy  Neurology following, EEG WNL  Keppra resumed  CT head non-acute  Abg reviewed  Sacral decubitus ulcer  Gsx on consult  Now s/p Excision of cyst, sharp excisional debridement of buttock decubitus ulcer POD#5  PRN pain control  Continue abx  UTI  UA reviewed  Ucx pending -> e. coli  Continue IV abx  Deescalate as indicated  NICM, HFrEF  Hx of paroxysmal afib  Hx of BRANDYN occlusion  AV paced  Continue home meds  CKD 3  Monitor renal function  Avoid nephrotoxic agents  Pancytopenia  Hgb stable today  Cont to monitor           8/19 Cardioliogy    Sacral decubitus ulcer s/p excision of cyst, excisional debridement 8/13  Mgmt per surgery  Chronic  HFrEF, NICM, Severe Wide-Open TR  Echo 6/2024 - LVEF 30-35%, dyskinesis of anteroseptal, anterior walls, biatrial dilation, mild AVS, wide open TR, PASP 48mmHg  [facility] 2022 w/non obs disease  GDMT - doses have been held d/t lethargy, low BP during admit  Coreg now changed to toprol d/t lower BP  Entresto tolerating  Spironolactone 25mg po daily - on hold d/t GFR  Farxiga 10mg po daily - on hold  Isordil 20mg po TID - on hold  Bumex 1mg po daily   Appears compensated on exam   Hypotension - improving; midodrine 5mg po TID  Altered Mental Status, Toxic Metabolic vs Infectious  Improving - drowsy but answered questions appropriately  EEG w/o seizure activity - on Keppra  Neuro following  GEORGE on CKD  BUN/Cr elevated above baseline, but downtrending  K 5.3  Acute on Chronic Anemia  Hgb 6.1 today - PRBC per primary  PRBC per primary  UTI  Urine culture +E Coli   On IV antibx  Paroxysmal Atrial Fibrillation s/p St Phillip Dual Chamber PPM/ICD - 9/2023  On amiodarone 200mg po daily  Formerly on Eliquis - no longer on AC d/t Hx severe GIB  Hx Bioprosthetic Mitral Valve Replacement  Severe RA - methotrexate at home  Hx Recurrent GIB, AVM s/p clip - 4/2023  Acute on Chronic Anemia  Hgb improved s/p PRBC  Hyperkalemia      Plan  Pt appears euvolemic  Palliative care has been consulted  BP is rebounding - overnight and this morning  Monitor BP on ARNI, BB, bumex - cont holding nitrates, SGLT2i              8/19    Drowsy but arousable       Temp:  [97.2 °F (36.2 °C)-98.5 °F (36.9 °C)] 97.5 °F (36.4 °C)  Pulse:  [59-61] 60  Resp:  [16] 16  BP: ()/(41-67) 121/58  SpO2:  [94 %-100 %] 100 %       Lab 08/13/24  0603 08/14/24  0639 08/15/24  0521 08/16/24  0617 08/16/24  1517 08/17/24  0654 08/18/24  0652 08/19/24  1247   WBC 2.5*   < > 6.0 6.4  --  7.5 7.6 8.0   HGB 8.3*   < > 7.8* 6.1* 8.9* 8.0* 9.4* 8.4*   MCV 98.5   < > 98.8 100.0  --  95.9 99.1 98.3   PLT 91.0*   < > 207.0 263.0  --  394.0 554.0* 675.0*   BAND 1  --  1  --   --    --   --   --     < > = values in this interval not displayed.                Recent Labs   Lab 08/16/24  0617 08/17/24  0654 08/18/24  0652 08/19/24  1247   * 93 65* 81   BUN 61* 62* 60* 70*   CREATSERUM 2.36* 2.20* 2.22* 1.89*   CA 8.5* 9.5 10.0 9.5   ALB 3.1*  --  3.9 3.7   * 136 136 139   K 4.4 4.8 5.3* 5.8*    106 105 107   CO2 21.0 21.0 21.0 24.0          levETIRAcetam  500 mg Intravenous Q24H    cefTRIAXone  1 g Intravenous Q24H         AMS -> intermittent   Unclear etiology, consider metabolic encephalopathy  Neurology following, EEG WNL  Keppra resumed  CT head non-acute  Abg reviewed  Sacral decubitus ulcer  Gsx on consult  Now s/p Excision of cyst, sharp excisional debridement of buttock decubitus ulcer POD#6  PRN pain control  Continue abx  UTI  UA reviewed  Ucx pending -> e. coli  Continue IV abx  Deescalate as indicated  NICM, HFrEF  Hx of paroxysmal afib  Hx of BRANDYN occlusion  AV paced  Continue home meds  CKD 3  Monitor renal function  Avoid nephrotoxic agents  Pancytopenia  Hgb stable today  Cont to monitor      Plan of care discussed with RN at bedside         Goals of care: Palliative care has been consulted, discussions on going regarding DC plan, daughter will discuss with family before making a decision on Hospice care. Otherwise will dc with home palliative care.               8/20 Cardiology    Pt is more lethargic this am - minimal verbal interaction with me this am, which is a decline from previous exam. Mental status has waxed and waned this admit. States she is \"so tired\". Reviewed with RN - pt did receive dilaudid overnight for pain and has fentanyl patch.      Objective:  /62 (BP Location: Right arm)   Pulse 60   Temp 97.5 °F (36.4 °C) (Axillary)   Resp 16   Wt 120 lb (54.4 kg)   SpO2 100%   BMI 20.60 kg/m²          levETIRAcetam  500 mg Intravenous Q24H    cefTRIAXone  1 g Intravenous Q24H     Medication Infusions    dextrose 50 mL/hr at 08/19/24 2003            Sacral decubitus ulcer s/p excision of cyst, excisional debridement 8/13  Mgmt per surgery  Chronic HFrEF, NICM, Severe Wide-Open TR  Echo 6/2024 - LVEF 30-35%, dyskinesis of anteroseptal, anterior walls, biatrial dilation, mild AVS, wide open TR, PASP 48mmHg  [facility] 2022 w/non obs disease  GDMT - multiple doses have been held d/t lethargy, low BP during admit  Coreg now changed to toprol d/t lower BP  Entresto decreased dosing to 24/26 po BID  Spironolactone 25mg po daily - on hold d/t GFR  Farxiga 10mg po daily - on hold  Isordil 20mg po TID - on hold  Bumex 1mg po daily   Appears fairly compensated on exam  Hypotension - improved; midodrine 5mg po TID  Altered Mental Status, Toxic Metabolic vs Infectious  Pt is more lethargic again today  EEG w/o seizure activity - on Kera  Neuro following  GEORGE on CKD  BUN/Cr downtrending  Acute on Chronic Anemia  Hgb stable  Rec'd PRBC on 8/16 for Hgb 6.1  UTI  Urine culture +E Coli   On IV antibx  Paroxysmal Atrial Fibrillation s/p St Phillip Dual Chamber PPM/ICD - 9/2023  On amiodarone 200mg po daily  Formerly on Eliquis - no longer on AC d/t Hx severe GIB  Hx Bioprosthetic Mitral Valve Replacement  Severe RA - methotrexate at home  Hx Recurrent GIB, AVM s/p clip - 4/2023     Plan:  Pt is more lethargic this am - likely will not tolerate po meds at this time  Palliative care team consult appreciated - ongoing [facility] discussion. Family reported to be considering hospice but has not yet decided.           8/20 IM    Drowsy but arousable       Gen: somnolent         levETIRAcetam  500 mg Intravenous Q24H    cefTRIAXone  1 g Intravenous Q24H     Medications 08/15/24 08/16/24 08/17/24 08/18/24 08/19/24 08/20/24 08/21/24   acetaminophen (Tylenol) 160 MG/5ML oral liquid 650 mg  Dose: 650 mg  Freq: Every 8 hours Route: OR  Start: 08/19/24 1500        1748 SK-Given      (0100 AS)-Not Given [C]     (0900 BB)-Not Given     1650 BB-Given      (0100 AS)-Not Given     (0900 BB)-Not Given      1700        amiodarone (Pacerone) tab 200 mg  Dose: 200 mg  Freq: Daily Route: OR  Start: 08/12/24 1945   Admin Instructions:   **Hold if SBP is less than 90 &/or heart rate less than 60    (0930 SS)-Not Given [C]      (0930 SS)-Not Given      0900 SS-Given [C]      (0930 AJ)-Not Given [C]      (0930 SK)-Not Given      (0930 BB)-Not Given      (0930 BB)-Not Given                       bumetanide (Bumex) tab 1 mg  Dose: 1 mg  Freq: Daily Route: OR  Start: 08/17/24 0930   Admin Instructions:   Hold for SBP <100      (0930 SS)-Not Given      0859 AJ-Given      (0930 SK)-Not Given      (0930 BB)-Not Given      (0930 BB)-Not Given        bumetanide (Bumex) tab 1 mg  Dose: 1 mg  Freq: 2 times daily (diuretic) Route: OR  Start: 08/15/24 0900 End: 08/17/24 0642   Admin Instructions:   Hold for SBP <100    (0900 SS)-Not Given [C]     (1700 SS)-Not Given [C]           1051 TP-Held by provider [C]     (1700 SS)-Not Given      0642 TP-Unheld by provider     0642-D/C'd                carvedilol (Coreg) tab 3.125 mg  Dose: 3.125 mg  Freq: 2 times daily with meals Route: OR  Start: 08/12/24 1945 End: 08/16/24 1027    (0800 SS)-Not Given [C]     (1800 SS)-Not Given           1027-D/C'd           cefTRIAXone (Rocephin) 1 g in sodium chloride 0.9% 100 mL IVPB-ADDV  Dose: 1 g  Freq: Every 24 hours Route: IV  Last Dose: 1 g (08/20/24 1345)  Start: 08/13/24 1400   Admin Instructions:   Ceftriaxone must NOT be administered simultaneously with calcium containing IV solutions. Includes Y-site as well.  In patients other than neonates ceftriaxone and calcium containing products may administered sequentially, provided the line is flushed in between administrations.   Order specific questions:       1457 SS-New Bag      1422 SS-New Bag      1731 SS-New Bag      1419 AJ-New Bag      1357 SK-New Bag      1345 BB-New Bag      1400                     cyclobenzaprine (Flexeril) tab 10 mg  Dose: 10 mg  Freq: Nightly Route: OR  Start: 08/12/24  2100 End: 08/20/24 1114    (2159 AM)-Not Given      2025 ZN-Given      2022 ZN-Given      (2020 ZN)-Not Given      (2100 AS)-Not Given [C]      1114-D/C'd       fentaNYL (Duragesic) 12 MCG/HR patch 1 patch  Dose: 1 patch  Freq: Every 72 hours Route: TD  Start: 08/12/24 1945   Admin Instructions:   Scan barcode on front of package  Apply patch to chest, back, flank, or upper arm.     1540 SS/PP-Fentanyl Patch Applied       0757 ZN/AJ-Fentanyl Patch Location Verified [C]      1433 SK/TR-Fentanyl Patch Removed     1435 SK/TR-Fentanyl Patch Applied [C]       0740 AS/BB-Fentanyl Patch Location Verified        ferrous sulfate DR tab 325 mg  Dose: 325 mg  Freq: Daily with breakfast Route: OR  Start: 08/13/24 0800   Admin Instructions:   Do not crush    (0800 SS)-Not Given [C]      (0800 SS)-Not Given      (0900 SS)-Not Given [C]      0900 AJ-Given      0909 SK-Given      (0800 BB)-Not Given      (0800 BB)-Not Given        folic acid (Folvite) tab 800 mcg  Dose: 800 mcg  Freq: Daily Route: OR  Start: 08/12/24 1945    (0930 SS)-Not Given [C]      (0930 SS)-Not Given      (0900 SS)-Not Given [C]      0905 AJ-Given      (0930 SK)-Not Given      (0930 BB)-Not Given      (0930 BB)-Not Given        gabapentin (Neurontin) cap 300 mg  Dose: 300 mg  Freq: 3 times daily Route: OR  Start: 08/12/24 2100    (0900 SS)-Not Given [C]     (1600 SS)-Not Given [C]     (2159 AM)-Not Given      (0900 SS)-Not Given     (1551 SS)-Not Given     2025 ZN-Given      (0900 SS)-Not Given [C]     (1600 SS)-Not Given [C]     2022 ZN-Given      (0900 AJ)-Not Given [C]     1557 AJ-Given     (2020 ZN)-Not Given      0909 SK-Given     (1600 SK)-Not Given     2042 AS-Given      (0900 BB)-Not Given     (1600 BB)-Not Given     (2100 AS)-Not Given [C]      (0900 BB)-Not Given     1600     2100        isosorbide dinitrate (Isordil) tab 20 mg  Dose: 20 mg  Freq: 3 times daily @ 0800, 1300, 1800 Route: OR  Start: 08/12/24 1945   Admin Instructions:   Do not  administer around the clock; allow nitrate-free interval of at least 14 hours.    (0800 SS)-Not Given [C]     (1300 SS)-Not Given [C]     (1800 SS)-Not Given           1045 TP-Held by provider [C]     (1300 SS)-Not Given     (1800 SS)-Not Given      (0800 SS)-Not Given     (1300 SS)-Not Given [C]     (1800 SS)-Not Given [C]      0800 TP     1300 TP     1800 TP      0800 TP     1300 TP     1800 TP      0800 TP     1300 TP     1800 TP      0800 TP     1300 TP     1800 TP        levETIRAcetam (Keppra) 500 mg/5mL injection 500 mg  Dose: 500 mg  Freq: Every 24 hours Route: IV  Start: 08/15/24 1415   Admin Instructions:   Administer slow IV push over 2 minutes    1457 SS-Given      1422 SS-Given      1732 SS-Given      1415 AJ-Given      1757 SK-Given      1348 BB-Given      1415        magnesium sulfate in sterile water for injection 2 g/50mL IVPB premix 2 g  Dose: 2 g  Freq: Once Route: IV  Last Dose: 2 g (08/16/24 1543)  Start: 08/16/24 0745 End: 08/16/24 1643     1543 SS-New Bag             metoprolol succinate ER (Toprol XL) 24 hr tab 25 mg  Dose: 25 mg  Freq: Daily Beta Blocker Route: OR  Start: 08/16/24 1030   Admin Instructions:   Hold for SBP <90  Do not crush     (1551 SS)-Not Given      0631 ZN-Given      0618 ZN-Given      0553 ZN-Hold      0543 AS-Given      (0600 AS)-Not Given        midodrine (ProAmatine) tab 5 mg  Dose: 5 mg  Freq: 3 times daily Route: OR  Start: 08/16/24 1200   Admin Instructions:   Not recommended to be administered within 4 hours of bedtime     (1200 SS)-Not Given     (1700 SS)-Not Given      0631 ZN-Given     (0900 SS)-Not Given [C]     (1700 SS)-Not Given [C]      0618 ZN-Given     (1141 AJ)-Not Given     1756 AJ-Given      0553 ZN-Hold     (1200 SK)-Not Given     (1700 SK)-Not Given      0543 AS-Given     (1200 BB)-Not Given [C]     (1700 BB)-Not Given      (0700 AS)-Not Given     1200     1700        midodrine (ProAmatine) tab 5 mg  Dose: 5 mg  Freq: Once Route: OR  Start: 08/15/24  2100 End: 08/15/24 2113   Admin Instructions:   Not recommended to be administered within 4 hours of bedtime    2113 AM-Given              midodrine (ProAmatine) tab 5 mg  Dose: 5 mg  Freq: 2 times daily before meals Route: OR  Start: 08/13/24 0700 End: 08/16/24 1055   Admin Instructions:   Not recommended to be administered within 4 hours of bedtime    (0634 AM)-Not Given     1800 SS-Given      0557 AM-Given     1055-D/C'd           pantoprazole (Protonix) DR tab 40 mg  Dose: 40 mg  Freq: 2 times daily before meals Route: OR  Start: 08/13/24 0700   Admin Instructions:   Do not crush    0634 AM-Given     (1700 SS)-Not Given [C]      0557 AM-Given     (1700 SS)-Not Given      0631 ZN-Given     (1700 SS)-Not Given [C]      0618 ZN-Given     1756 AJ-Given      0553 ZN-Hold     1745 SK-Given      0543 AS-Given     (1700 BB)-Not Given      (0700 AS)-Not Given     1700        patiromer (Veltassa) 8.4 g oral packet 8.4 g  Dose: 8.4 g  Freq: Once Route: OR  Start: 08/19/24 1600   Admin Instructions:   Add to 1/3 cup of water.  Stir thoroughly and drink immediately.  Separate from all other medications by at least 3 hours.        (1600 SK)-Not Given          sacubitril-valsartan (Entresto) 24-26 MG per tab 1 tablet  Dose: 1 tablet  Freq: 2 times daily Route: OR  Start: 08/17/24 0900   Admin Instructions:   Hold for SBP <100      (0900 SS)-Not Given     2022 ZN-Given      0859 AJ-Given     (2020 ZN)-Not Given [C]      0909 SK-Given     (2100 AS)-Not Given [C]      (0900 BB)-Not Given     (2100 AS)-Not Given [C]      (0900 BB)-Not Given     2100        sacubitril-valsartan (Entresto) 49-51 MG per tab 1 tablet  Dose: 1 tablet  Freq: 2 times daily Route: OR  Start: 08/12/24 2100 End: 08/17/24 0642    (0900 SS)-Not Given [C]     (2159 AM)-Not Given           1054 TP-Held by provider [C]     2100 TP      0642 TP-Unheld by provider     0642-D/C'd              sodium chloride 0.9 % IV bolus 500 mL  Dose: 500 mL  Freq: Once Route:  IV  Last Dose: 500 mL (08/15/24 1949)  Start: 08/15/24 2000 End: 08/15/24 2049    1949 AM-New Bag                   sodium chloride 0.9% infusion  Freq: Once Route: IV  Start: 08/16/24 0715 End: 08/16/24 1100   Admin Instructions:   To standard blood administration set with integral filter. Change every 4 hours or after 2 units, whichever comes first. ALERT: NEVER mix any medication or solution with blood or blood products.  Run at KVO rate     1100 SS-New Bag                  sodium hypochlorite (Dakin's) 0.125 % external solution  Freq: 2 times daily Route: TOP  Start: 08/12/24 2100   Admin Instructions:   See Wound Care Dressing Instructions   Order specific questions:            (2100 AM)-Not Given           2027 ZN-Given      1100 SS-Given     2034 ZN-Given      1030 AJ-Given     2027 ZN-Given      1030 SK-Given     2043 AS-Given      (0900 BB)-Not Given      0000 AS-Given     1200-BB     2100            dextrose 5% infusion  Rate: 50 mL/hr  Freq: Continuous Route: IV  Start: 08/19/24 1845        1845 SK-New Bag      1428 BB-New Bag              acetaminophen (Ofirmev) 10 mg/mL infusion premix 1,000 mg  Dose: 1,000 mg  Freq: Every 6 hours PRN Route: IV  PRN Reason: Pain  Last Dose: 1,000 mg (08/19/24 2042)  Start: 08/15/24 1512   Order specific questions:       1542 SS-New Bag      0410 AM-New Bag       1140 AJ-New Bag      2042 AS-New Bag                glucose (Dex4) 15 GM/59ML oral liquid 15 g  Dose: 15 g  Freq: Every 15 min PRN Route: OR  PRN Reason: Low blood glucose  PRN Comment: less than 70 mg/dL, OR blood glucose  mg/dL with symptoms of hypoglycemia  Start: 08/18/24 1024   Admin Instructions:   Use PRN reason as a guide and follow hypoglycemia policy        1755 SK-Given                          HYDROcodone-acetaminophen (Norco)  MG per tab 1 tablet  Dose: 1 tablet  Freq: Every 8 hours PRN Route: OR  PRN Reason: moderate pain  Start: 08/12/24 1942     1551 SS-Given      0631 ZN-Given      2022 ZN-Given             HYDROmorphone (Dilaudid) 1 MG/ML injection 0.2 mg  Dose: 0.2 mg  Freq: Every 4 hours PRN Route: IV  PRN Reasons: moderate pain,severe pain  Start: 08/20/24 1058   Admin Instructions:   Use PRN reason as a guide and follow range order policy. If oral pain meds are ordered and patient can tolerate oral intake, start with PRN oral pain medications first.         1737 BB-Given     2305 AS-Given by Other               Or   HYDROmorphone (Dilaudid) 1 MG/ML injection 0.4 mg  Dose: 0.4 mg  Freq: Every 3 hours PRN Route: IV  PRN Reasons: moderate pain,severe pain  Start: 08/19/24 1449 End: 08/20/24 1059   Admin Instructions:   Use PRN reason as a guide and follow range order policy. If oral pain meds are ordered and patient can tolerate oral intake, start with PRN oral pain medications first.         0046 AS-Given     0404 AS-Given     1059-D/C'd            morphINE PF 2 MG/ML injection 1 mg  Dose: 1 mg  Freq: Every 3 hours PRN Route: IV  PRN Reason: mild pain  Start: 08/13/24 2117 End: 08/19/24 1449    0807 SS-Given     2248 AM-Given         0951 SK-Given     1449-D/C'd             traMADol (Ultram) tab 50 mg  Dose: 50 mg  Freq: Every 12 hours PRN Route: OR  PRN Reason: moderate pain  Start: 08/18/24 1521   Admin Instructions:   Max dose 400 mg/day       1557 AJ-Given                Vitals (last day)       Date/Time Temp Pulse Resp BP SpO2 Weight O2 Device O2 Flow Rate (L/min) [facility]    08/21/24 0724 97.5 °F (36.4 °C) 59 16 124/69 100 % -- Nasal cannula 1 L/min LB    08/21/24 0600 98.1 °F (36.7 °C) 63 16 119/62 96 % -- Nasal cannula 1 L/min CG    08/20/24 1941 97.3 °F (36.3 °C) 58 15 115/69 97 % -- Nasal cannula 1 L/min CG    08/20/24 1342 97.4 °F (36.3 °C) 60 15 130/79 97 % -- Nasal cannula 1 L/min BB    08/20/24 0835 97.5 °F (36.4 °C) 60 16 131/62 100 % -- Nasal cannula 2 L/min NH    08/20/24 0529 97.4 °F (36.3 °C) 60 16 115/64 97 % -- Nasal cannula 2 L/min AS    08/20/24 0357 97.3 °F (36.3 °C) 63  16 143/74 98 % -- Nasal cannula 2 L/min AS     08/18/24 1954 97.2 °F (36.2 °C) 59 16 87/41 Abnormal  96 % -- Nasal cannula 2 L/min NHA     08/16/24 0740 99 °F (37.2 °C) 98 16 74/47 Abnormal  98 % -- Nasal cannula 2 L/min LB   08/16/24 0555 99.9 °F (37.7 °C) -- -- 92/56 -- -- -- 2 L/min AM   08/16/24 0411 101.4 °F (38.6 °C) Abnormal  -- 16 105/57 100 % -- Nasal cannula 2 L/min AM       Blood Transfusion Record       Product Unit Status Volume Start End            Transfuse RBC       24  [id number]  [id number] Stopped 307.5 mL 08/16/24 1100 08/16/24 1406

## 2024-08-21 NOTE — PROGRESS NOTES
[facility]  part of [place] Hospitalist Progress Note     Margaret Silverio Patient Status:  Inpatient    3/14/1945 MRN [id number]   Location [facility] POST ANESTHESIA CARE UNIT Attending Fernando Galan MD   Hosp Day # 9 PCP Johnathon Rodriguez,      Subjective:     Pt was seen and examined, awake, alert, answering some question.   Continues to intermittently refuse meds and food.   Plan discussed with family at the bedside.       Objective:    Review of Systems:   ROS completed; pertinent positive and negatives stated in subjective.      Vital signs:  Temp:  [97.3 °F (36.3 °C)-98.1 °F (36.7 °C)] 97.5 °F (36.4 °C)  Pulse:  [58-63] 59  Resp:  [15-16] 16  BP: (115-130)/(62-79) 124/69  SpO2:  [96 %-100 %] 100 %      Physical Exam:    Gen: alert, oriented x1  Chest: good air entry CTABL  CVS: normal s1 and s2 RR  Abd: NABS soft NT ND  Neuro: non-focal, drowsy  Skin: decub dressing CDI  Ext: no edema in bilateral LE      Diagnostic Data:    Labs:  Recent Labs   Lab 08/15/24  0521 24  0617 24  0654 24  0652 24  1247 24  0626 24  0555   WBC 6.0   < > 7.5 7.6 8.0 8.3 8.5   HGB 7.8*   < > 8.0* 9.4* 8.4* 8.1* 9.5*   MCV 98.8   < > 95.9 99.1 98.3 95.2 97.8   .0   < > 394.0 554.0* 675.0* 712.0* 671.0*   BAND 1  --   --   --  2 1 2    < > = values in this interval not displayed.       Recent Labs   Lab 24  1247 24  0626 24  0555   GLU 81 100* 93   BUN 70* 59* 48*   CREATSERUM 1.89* 1.56* 1.43*   CA 9.5 9.1 9.3   ALB 3.7 3.4 3.4    134* 132*   K 5.8* 4.6 5.1    104 102   CO2 24.0 22.0 22.0       Estimated Creatinine Clearance: 27.8 mL/min (A) (based on SCr of 1.43 mg/dL (H)).    No results for input(s): \"PTP\", \"INR\" in the last 168 hours.           Imaging: Imaging data reviewed in Epic.    Medications:    acetaminophen  650 mg Oral Q8H    patiromer  8.4 g Oral Once    sacubitril-valsartan  1 tablet Oral BID     bumetanide  1 mg Oral Daily    metoprolol succinate  25 mg Oral Daily Beta Blocker    midodrine  5 mg Oral TID    levETIRAcetam  500 mg Intravenous Q24H    cefTRIAXone  1 g Intravenous Q24H    sodium hypochlorite   Topical BID    amiodarone  200 mg Oral Daily    fentaNYL  1 patch Transdermal Q72H    ferrous sulfate  325 mg Oral Daily with breakfast    folic acid  800 mcg Oral Daily    gabapentin  300 mg Oral TID    [Held by provider] isosorbide dinitrate  20 mg Oral TID (Nitrates)    pantoprazole  40 mg Oral BID AC       Assessment & Plan:     AMS -> intermittent   Unclear etiology, consider metabolic encephalopathy  Neurology following, EEG WNL  Keppra resumed  CT head non-acute  Abg reviewed  Sacral decubitus ulcer  Gsx on consult  Now s/p Excision of cyst, sharp excisional debridement of buttock decubitus ulcer POD#8  PRN pain control  Complete abx course  UTI  UA reviewed  Ucx pending -> e. coli  Continue IV abx - complete course of treatment  NICM, HFrEF  Hx of paroxysmal afib  Hx of BRANDYN occlusion  AV paced  Continue home meds  CKD 3  Monitor renal function  Avoid nephrotoxic agents  Anemia  Hgb stable today  Cont to monitor   Thrombocytosis  Monitor labs  Heparin s/c for DVT ppx  Nutrition  Dietitian on consult  Encouraged to eat more    Plan of care discussed with RN at bedside       Goals of care: Palliative care has been consulted, discussions on going regarding DC plan, daughter will discuss with family before making a decision on Hospice care. Otherwise will dc with home palliative care.     Supplementary Documentation:     Quality:  DVT Prophylaxis: Heparin s/c  CODE status: DNAR/Select      Estimated date of discharge: TBD  Discharge is dependent on: clinical stability  At this point Ms. Silverio is expected to be discharge to: TBD    MDM: High

## 2024-08-21 NOTE — PROGRESS NOTES
08/21/24 1426   Wound 08/12/24 Buttocks Right   Date First Assessed/Time First Assessed: 08/12/24 1502   Present on Original Admission: Yes  Primary Wound Type: Pressure Injury  Location: Buttocks  Wound Location Orientation: Right  Wound Description (Comments): s/p debridement 8/13   Wound Image    Site Assessment Clean;Fragile;Granulation tissue;Moist;Painful;Pink;Red   Closure Not approximated   Drainage Amount Scant   Drainage Description Serosanguineous   Treatments Dakins   Dressing 4x4s;Aquacel Foam   Dressing Changed Changed   Dressing Status Dressing Changed;Removed;Old drainage   Wound Length (cm) 3.8 cm   Wound Width (cm) 3.3 cm   Wound Surface Area (cm^2) 12.54 cm^2   Wound Depth (cm) 0.3 cm   Wound Volume (cm^3) 3.762 cm^3   Wound Healing % 74   Mikayla-wound Assessment Clean;Dry;Intact   Wound Granulation Tissue Pink;Red   Wound Bed Granulation (%) 90 %   State of Healing Early/partial granulation   Wound Odor None   Pressure Injury Stage 3   Wound Follow Up   Follow up needed Yes     Pt seen with bedside RN for wound follow up and dressing change. There are 2 intact sutures to the sacral area, no wound or breakdown noted. The left buttock friction and shear has healed. The right buttock wound s/p I and D w Dr. Mckeon on 8/13 is healing, see above. Recommend to continue dakins q 12 h. Zinc applied to bilat buttocks and mikayla area. Pillow placed under the left hip, wedge under right shoulder, air pump in place to gel mattress.

## 2024-08-21 NOTE — PROGRESS NOTES
[facility] Cardiology Progress Note    Margaret Silverio Patient Status:  Inpatient    3/14/1945 MRN [id number]   Location [facility] 4W/SW/SE Attending Veto Zhang MD   Hosp Day # 9 PCP Johnathon Rodriguez,      Subjective:  Tearful . Refused meds this AM  Denies cp, sob    Objective:  /69 (BP Location: Right arm)   Pulse 59   Temp 97.5 °F (36.4 °C) (Temporal)   Resp 16   Wt 120 lb (54.4 kg)   SpO2 100%   BMI 20.60 kg/m²     Telemetry: Not monitored      Intake/Output:    Intake/Output Summary (Last 24 hours) at 2024 1151  Last data filed at 2024 1146  Gross per 24 hour   Intake 5 ml   Output 550 ml   Net -545 ml       Last 3 Weights   24 1511 120 lb (54.4 kg)   24 0859 120 lb (54.4 kg)   24 1100 112 lb 1.6 oz (50.8 kg)   24 0355 115 lb 3.2 oz (52.3 kg)   24 0620 109 lb 6.4 oz (49.6 kg)   24 0450 112 lb 11.2 oz (51.1 kg)   24 0458 113 lb 9.6 oz (51.5 kg)   24 0508 117 lb 14.4 oz (53.5 kg)   24 0614 123 lb 12.8 oz (56.2 kg)   07/15/24 1830 124 lb 1.6 oz (56.3 kg)       Labs:  Recent Labs   Lab 24  1247 24  0626 24  0555   GLU 81 100* 93   BUN 70* 59* 48*   CREATSERUM 1.89* 1.56* 1.43*   EGFRCR 27* 34* 38*   CA 9.5 9.1 9.3    134* 132*   K 5.8* 4.6 5.1    104 102   CO2 24.0 22.0 22.0     Recent Labs   Lab 24  1247 24  0626 24  0555   RBC 2.88* 2.73* 3.18*   HGB 8.4* 8.1* 9.5*   HCT 28.3* 26.0* 31.1*   MCV 98.3 95.2 97.8   MCH 29.2 29.7 29.9   MCHC 29.7* 31.2 30.5*   RDW 19.7* 19.5* 19.8*   NEPRELIM 4.79 5.13 4.44   WBC 8.0 8.3 8.5   .0* 712.0* 671.0*         No results for input(s): \"TROP\", \"TROPHS\", \"CK\" in the last 168 hours.    Diagnostics:         Review of Systems   Respiratory: Negative.     Cardiovascular: Negative.      Physical Exam:    General: Alert and oriented x 3. No apparent distress.   HEENT: Normocephalic, anicteric sclera, neck supple, no  thyromegaly or adenopathy.  Neck: No JVD, carotids 2+, no bruits.  Cardiac: Regular rate & rhythm. S1, S2 normal. No pericardial rub, S3, or extra cardiac sounds. 2/6 ALY  Lungs: Clear without wheezes, rales, rhonchi or dullness.  Normal excursions and effort.  Abdomen: Soft, non-tender. No organosplenomegally, mass or rebound, BS-present.  Extremities: Without clubbing or cyanosis.  No left lower extremity edema, no right lower extremity edema.  Neurologic: Alert and oriented, normal affect. No focal defects  Skin: Warm and dry.       Medications:   acetaminophen  650 mg Oral Q8H    patiromer  8.4 g Oral Once    sacubitril-valsartan  1 tablet Oral BID    bumetanide  1 mg Oral Daily    metoprolol succinate  25 mg Oral Daily Beta Blocker    midodrine  5 mg Oral TID    levETIRAcetam  500 mg Intravenous Q24H    cefTRIAXone  1 g Intravenous Q24H    sodium hypochlorite   Topical BID    amiodarone  200 mg Oral Daily    fentaNYL  1 patch Transdermal Q72H    ferrous sulfate  325 mg Oral Daily with breakfast    folic acid  800 mcg Oral Daily    gabapentin  300 mg Oral TID    [Held by provider] isosorbide dinitrate  20 mg Oral TID (Nitrates)    pantoprazole  40 mg Oral BID AC      dextrose 50 mL/hr at 08/20/24 1428       Assessment:    Sacral decubitus ulcer s/p excision of cyst, excisional debridement 8/13  Mgmt per surgery  Chronic HFrEF, NICM, Severe Wide-Open TR  Echo 6/2024 - LVEF 30-35%, dyskinesis of anteroseptal, anterior walls, biatrial dilation, mild AVS, wide open TR, PASP 48mmHg  [facility] 2022 w/non obs disease  GDMT - multiple doses have been held d/t lethargy, low BP during admit  Coreg now changed to toprol d/t lower BP  Entresto decreased dosing to 24/26 po BID  Spironolactone 25mg po daily - on hold d/t GFR  Farxiga 10mg po daily - on hold  Isordil 20mg po TID - on hold  Bumex 1mg po daily   Appears fairly compensated on exam  Hypotension - improved; midodrine 5mg po TID  Altered Mental Status, Toxic Metabolic vs  Infectious  EEG w/o seizure activity - on Keppra  Neuro following  GEORGE on CKD  BUN/Cr downtrending  Acute on Chronic Anemia  Hgb stable  Rec'd PRBC on 8/16    UTI  Urine culture +E Coli   On IV antibx  Paroxysmal Atrial Fibrillation s/p [place] Dual Chamber PPM/ICD - 9/2023  On amiodarone 200mg po daily  Formerly on Eliquis - no longer on AC d/t Hx severe GIB  Hx Bioprosthetic Mitral Valve Replacement  Severe RA - methotrexate at home  Hx Recurrent GIB, AVM s/p clip - 4/2023    Plan:    Pt more alert/awake today. Tearful at this time   Daughter at bedside discussing pt's wishes. She reports that pt \"isn't willing to give up\" but refused to eat or take her AM medications . Palliative care following . No decision on hospice at this time  Pt is now agreeable to take her medications after discussion with this APN   Compensated on exam . Monitor I/o ,daily wts & renal fx closely       YOLIE Bucio  8/21/2024  11:51 AM  Ph [phone] ([place])  Ph [phone] ([place])      Cardiologist Addendum:  Margaret Silverio was seen and examined independently and I agree with the above documentation provided by KRYSTAL Bucio.     L3    Adriana Myers DO  [facility]   Interventional Cardiac and Vascular Services      August 21, 2024  3:23 PM

## 2024-08-21 NOTE — PALLIATIVE CARE NOTE
[facility]  part of [facility]  Palliative Care Progress Note    Margaret Silverio Patient Status:  Inpatient    3/14/1945 MRN [id number]   Location [facility] 4W/SW/SE Attending Fernando Galan MD   Hosp Day # 9 PCP Johnathon Rodriguez,      The  Cures Act makes medical notes like these available to patients in the interest of transparency. Please be advised this is a medical document. Medical documents are intended to carry relevant information, facts as evident, and the clinical opinion of the practitioner. The medical note is intended as peer to peer communication and may appear blunt or direct. It is written in medical language and may contain abbreviations or verbiage that are unfamiliar.     Subjective     When I entered the room, the patient was in the bed, she is awake today, her dtr Barbi is present at the bedside. Pt states her butt is hurting.      Review of pertinent medication requirements in past 24 hours:  Dilaudid 0.2mg ivp X2, Fentanyl patch 12mcg    Palliative Care symptom needs assessed:     Co/ pain in buttock where decubiti is present    Allergies:  Allergies   Allergen Reactions    Lisinopril Coughing       Medications:     Current Facility-Administered Medications:     heparin (Porcine) 5000 UNIT/ML injection 5,000 Units, 5,000 Units, Subcutaneous, Q8H BR[place]NN    HYDROmorphone (Dilaudid) 1 MG/ML injection 0.2 mg, 0.2 mg, Intravenous, Q4H PRN **OR** [DISCONTINUED] HYDROmorphone (Dilaudid) 1 MG/ML injection 0.4 mg, 0.4 mg, Intravenous, Q3H PRN **OR** [DISCONTINUED] HYDROmorphone (Dilaudid) 1 MG/ML injection 0.8 mg, 0.8 mg, Intravenous, Q3H PRN    acetaminophen (Tylenol) 160 MG/5ML oral liquid 650 mg, 650 mg, Oral, Q8H    senna-docusate (Senokot-S) 8.6-50 MG per tab 2 tablet, 2 tablet, Oral, Daily PRN    polyethylene glycol (PEG 3350) (Miralax) 17 g oral packet 17 g, 17 g, Oral, Daily PRN    patiromer (Veltassa) 8.4 g oral packet 8.4 g, 8.4 g,  Oral, Once    dextrose 5% infusion, , Intravenous, Continuous    glucose (Dex4) 15 GM/59ML oral liquid 15 g, 15 g, Oral, Q15 Min PRN **OR** glucose (Glutose) 40% oral gel 15 g, 15 g, Oral, Q15 Min PRN **OR** glucose-vitamin C (Dex-4) chewable tab 4 tablet, 4 tablet, Oral, Q15 Min PRN **OR** dextrose 50% injection 50 mL, 50 mL, Intravenous, Q15 Min PRN **OR** glucose (Dex4) 15 GM/59ML oral liquid 30 g, 30 g, Oral, Q15 Min PRN **OR** glucose (Glutose) 40% oral gel 30 g, 30 g, Oral, Q15 Min PRN **OR** glucose-vitamin C (Dex-4) chewable tab 8 tablet, 8 tablet, Oral, Q15 Min PRN    traMADol (Ultram) tab 50 mg, 50 mg, Oral, Q12H PRN    sacubitril-valsartan (Entresto) 24-26 MG per tab 1 tablet, 1 tablet, Oral, BID    bumetanide (Bumex) tab 1 mg, 1 mg, Oral, Daily    metoprolol succinate ER (Toprol XL) 24 hr tab 25 mg, 25 mg, Oral, Daily Beta Blocker    midodrine (ProAmatine) tab 5 mg, 5 mg, Oral, TID    levETIRAcetam (Keppra) 500 mg/5mL injection 500 mg, 500 mg, Intravenous, Q24H    acetaminophen (Ofirmev) 10 mg/mL infusion premix 1,000 mg, 1,000 mg, Intravenous, Q6H PRN    ondansetron (Zofran) 4 MG/2ML injection 4 mg, 4 mg, Intravenous, Q6H PRN    metoclopramide (Reglan) 5 mg/mL injection 5 mg, 5 mg, Intravenous, Q8H PRN    cefTRIAXone (Rocephin) 1 g in sodium chloride 0.9% 100 mL IVPB-ADDV, 1 g, Intravenous, Q24H    sodium hypochlorite (Dakin's) 0.125 % external solution, , Topical, BID    acetaminophen (Tylenol Extra Strength) tab 500 mg, 500 mg, Oral, Q4H PRN    amiodarone (Pacerone) tab 200 mg, 200 mg, Oral, Daily    fentaNYL (Duragesic) 12 MCG/HR patch 1 patch, 1 patch, Transdermal, Q72H    ferrous sulfate DR tab 325 mg, 325 mg, Oral, Daily with breakfast    folic acid (Folvite) tab 800 mcg, 800 mcg, Oral, Daily    gabapentin (Neurontin) cap 300 mg, 300 mg, Oral, TID    HYDROcodone-acetaminophen (Norco)  MG per tab 1 tablet, 1 tablet, Oral, Q8H PRN    [Held by provider] isosorbide dinitrate (Isordil) tab 20 mg,  20 mg, Oral, TID (Nitrates)    pantoprazole (Protonix) DR tab 40 mg, 40 mg, Oral, BID AC    Objective     Vital Signs:  Blood pressure 124/69, pulse 59, temperature 97.5 °F (36.4 °C), temperature source Temporal, resp. rate 16, weight 120 lb (54.4 kg), SpO2 100%.  Body mass index is 20.6 kg/m².  Present Level of pain: \"hurting\"   Non-verbal signs of pain present: No    Physical Exam:  General: Margaret is in the bed she is more awake today, talking is very weak on exam, she appears elderly, cachetic and frail.   HEENT: dry oral MM  Cardiac: + murmer regular rate and rhythm, S1, S2 normal, no rub or gallop.  Lungs: + bilateral rales without wheezes.  Normal excursions and effort.2 liters NC  Abdomen: Soft, flat non-tender, + bowel sounds, no rebound or guarding, + BM  Extremities: Without clubbing, cyanosis. BLE Edema not present  Neurologic: drowsy but arousable, flat affect  Skin: Warm and dry.  + sacral wound see wound care note     Prior to admission Palliative performance scale PPSv2 (%): 30    Hematology:  Lab Results   Component Value Date    WBC 8.5 08/21/2024    HGB 9.5 (L) 08/21/2024    HCT 31.1 (L) 08/21/2024    .0 (H) 08/21/2024       Coags:  Lab Results   Component Value Date    INR 1.25 (H) 02/09/2024    PTT 35.2 02/09/2024       Chemistry:  Lab Results   Component Value Date    CREATSERUM 1.43 (H) 08/21/2024    BUN 48 (H) 08/21/2024     (L) 08/21/2024    K 5.1 08/21/2024     08/21/2024    CO2 22.0 08/21/2024    GLU 93 08/21/2024    CA 9.3 08/21/2024    ALB 3.4 08/21/2024    ALKPHO 328 (H) 06/18/2024    BILT 0.8 06/18/2024    TP 7.7 06/18/2024    AST 23 06/18/2024    ALT 11 06/18/2024    MG 2.1 08/21/2024    PHOS 2.9 08/21/2024       Imaging:  No results found.      Summary of Discussion    8/21/24 followed up with Margaret today she is more awake on exam, her dtr Barbi is present at the bedside, Margaret has been refusing to eat, she states the food does not taste good. Barbi stated  her father brought in some soup from home a day ago and she ate the soup. Discussed bringing in food from home that she likes to eat, discussed with Margaret need for nutritional protein to help with wound healing and general healing.   I explored with Margaret if she is feeling depressed she denied depression. Discussed use of Remeron to help with appetite, there is concern for increased drowsiness with the Remeron, discussed use of Cymbalta, pt declines antidepressant at this time.   I explored with Margaret if she wanted feeding tube she stated no she would not want feeding tube.   Barbi stated she asked her mother if she wanted to die as she is not taking her medications nor eating well.  Margaret stated she is not ready to die.   I discussed use of the Norco as needed  as pt expressed not wanting to be too sleepy however she continues to experience a lot pain in her buttock area. She has not taken anything for pain since last night and has been refusing to take the scheduled tylenol. We discussed this and I asked the RN to provide dose of Norco. I discussed with pt and dtr to call if needed I will follow up on Friday as I am off tomorrow. One of my coworkers will follow up tomorrow if needed.     8/20/24 Followed up with Margaret today, she is not responsive for me on exam, tries to open her eyes when asked. vital signs stable, she did receive 2 doses of the 0.4mg Dilaudid over the last 24 hours. I spoke with her dtr Barbi expressed concern over managing her pain and trying to keep her awake and alert to eat and participate in care.   Barbi stated her mother experienced this last week where she was not responsive.   Rn reported pt was crying overnight due to pain  Will continue with the Fentanyl patch as that is not new. Decrease the Dilaudid to 0.2mg every 4 hours and will monitor. discussed with dtr Barbi.       Assessment and Recommendation      Pressure injury of skin of sacral region, unspecified injury  stage    S/P I&D 8/13/24    Pancytopenia    Non ischemic Cardiomyopathy    SSS PPM/AICD    Afib    Urinary tract infection without hematuria,    Anemia    Encephalopathy    CKD    H/o GIB    Frequent hospitalizations    H/o hypotension     Pain-concern of increased drowsiness will monitor pt. -pt has been refusing to take medications, I discussed this with her.   -continue with scheduled Tylenol 650mg every 8 hours  -Continue with Fentanyl patch 12mcg  -stopped the morphine due to kidney function  -Dilaudid 0.2mg every 4 hours as needed for pain  -continue with gabapentin 300mg TID  -stop the Flexeril nightly  -Norco 10/325 as needed      Poor appetite  -offered Remeron daily for depression and appetite, pt declines this at this time.   Offered Cymbalta she declined      Bowel regimen  -add senna daily as needed  -Miralax daily as needed     Goals of care counseling  -see above for details  -Pt is DNAR/DNI with selective treatment  -Agreeable to palliative care following  -Dispo: TBD. Dtr is agreeable to CPC. SW to help with dc planning.   - not requested.   -Margaret stated she would not want feeding tube.   -ongoing GOC discussions will be needed over time.  -pts dtr Barbi will talk with family about getting hospice informational session while here.   -Provided emotional support to pt/family who are coping adequately     Advance care planning  -see above for details  -Pt's dtr Barbi Silverio is HCPOA [phone]  -HCPOA and POLST pw are on file in [email or web address].      Palliative Performance Scale 20%     Discussed today's visit with  Dr Zhang and Keyana RN    Palliative Care Follow Up: Palliative care team will continue to follow.Feel free to contact our team with any questions or concerns.    Thank you for allowing Palliative Care services to participate in the care of Margaret Silverio.    A total of 25 minutes were spent on this follow-up, which included all of the following: chart review, direct face to  face contact, history taking, physical examination, counseling and coordinating care, and documentation.     Macy Marrufo, [id number]  8/21/2024  1:47PM  Palliative Care Services

## 2024-08-22 ENCOUNTER — APPOINTMENT (OUTPATIENT)
Dept: GENERAL RADIOLOGY | Facility: HOSPITAL | Age: 79
DRG: 853 | End: 2024-08-22
Attending: INTERNAL MEDICINE
Payer: MEDICARE

## 2024-08-22 LAB
ALBUMIN SERPL-MCNC: 3.1 G/DL (ref 3.2–4.8)
ALBUMIN/GLOB SERPL: 1 {RATIO} (ref 1–2)
ALP LIVER SERPL-CCNC: 155 U/L
ALT SERPL-CCNC: <7 U/L
ANION GAP SERPL CALC-SCNC: 7 MMOL/L (ref 0–18)
AST SERPL-CCNC: 17 U/L (ref ?–34)
BASOPHILS # BLD: 0 X10(3) UL (ref 0–0.2)
BASOPHILS NFR BLD: 0 %
BILIRUB SERPL-MCNC: 0.4 MG/DL (ref 0.2–1.1)
BILIRUB UR QL: NEGATIVE
BUN BLD-MCNC: 46 MG/DL (ref 9–23)
BUN/CREAT SERPL: 31.9 (ref 10–20)
CALCIUM BLD-MCNC: 8.8 MG/DL (ref 8.7–10.4)
CHLORIDE SERPL-SCNC: 102 MMOL/L (ref 98–112)
CLARITY UR: CLEAR
CO2 SERPL-SCNC: 20 MMOL/L (ref 21–32)
CREAT BLD-MCNC: 1.44 MG/DL
DEPRECATED RDW RBC AUTO: 64.4 FL (ref 35.1–46.3)
EGFRCR SERPLBLD CKD-EPI 2021: 37 ML/MIN/1.73M2 (ref 60–?)
EOSINOPHIL # BLD: 0.12 X10(3) UL (ref 0–0.7)
EOSINOPHIL NFR BLD: 1 %
ERYTHROCYTE [DISTWIDTH] IN BLOOD BY AUTOMATED COUNT: 19.5 % (ref 11–15)
GLOBULIN PLAS-MCNC: 3.2 G/DL (ref 2–3.5)
GLUCOSE BLD-MCNC: 380 MG/DL (ref 70–99)
GLUCOSE BLDC GLUCOMTR-MCNC: 100 MG/DL (ref 70–99)
GLUCOSE BLDC GLUCOMTR-MCNC: 103 MG/DL (ref 70–99)
GLUCOSE BLDC GLUCOMTR-MCNC: 111 MG/DL (ref 70–99)
GLUCOSE BLDC GLUCOMTR-MCNC: 112 MG/DL (ref 70–99)
GLUCOSE BLDC GLUCOMTR-MCNC: 27 MG/DL (ref 70–99)
GLUCOSE BLDC GLUCOMTR-MCNC: 59 MG/DL (ref 70–99)
GLUCOSE BLDC GLUCOMTR-MCNC: 70 MG/DL (ref 70–99)
GLUCOSE BLDC GLUCOMTR-MCNC: 79 MG/DL (ref 70–99)
GLUCOSE BLDC GLUCOMTR-MCNC: 83 MG/DL (ref 70–99)
GLUCOSE UR-MCNC: NORMAL MG/DL
HCT VFR BLD AUTO: 25.9 %
HGB BLD-MCNC: 8.4 G/DL
HGB UR QL STRIP.AUTO: NEGATIVE
KETONES UR-MCNC: NEGATIVE MG/DL
LEUKOCYTE ESTERASE UR QL STRIP.AUTO: 75
LYMPHOCYTES NFR BLD: 0.71 X10(3) UL (ref 1–4)
LYMPHOCYTES NFR BLD: 6 %
MCH RBC QN AUTO: 30.4 PG (ref 26–34)
MCHC RBC AUTO-ENTMCNC: 32.4 G/DL (ref 31–37)
MCV RBC AUTO: 93.8 FL
METAMYELOCYTES # BLD: 0.12 X10(3) UL
METAMYELOCYTES NFR BLD: 1 %
MONOCYTES # BLD: 1.67 X10(3) UL (ref 0.1–1)
MONOCYTES NFR BLD: 14 %
NEUTROPHILS # BLD AUTO: 7.99 X10 (3) UL (ref 1.5–7.7)
NEUTROPHILS NFR BLD: 74 %
NEUTS BAND NFR BLD: 4 %
NEUTS HYPERSEG # BLD: 9.28 X10(3) UL (ref 1.5–7.7)
NITRITE UR QL STRIP.AUTO: NEGATIVE
OSMOLALITY SERPL CALC.SUM OF ELEC: 296 MOSM/KG (ref 275–295)
PH UR: 6 [PH] (ref 5–8)
PLATELET # BLD AUTO: 658 10(3)UL (ref 150–450)
PLATELET MORPHOLOGY: NORMAL
POTASSIUM SERPL-SCNC: 4.2 MMOL/L (ref 3.5–5.1)
PROT SERPL-MCNC: 6.3 G/DL (ref 5.7–8.2)
PROT UR-MCNC: NEGATIVE MG/DL
RBC # BLD AUTO: 2.76 X10(6)UL
SODIUM SERPL-SCNC: 129 MMOL/L (ref 136–145)
SP GR UR STRIP: 1.01 (ref 1–1.03)
TOTAL CELLS COUNTED BLD: 100
UROBILINOGEN UR STRIP-ACNC: NORMAL
WBC # BLD AUTO: 11.9 X10(3) UL (ref 4–11)

## 2024-08-22 PROCEDURE — 99233 SBSQ HOSP IP/OBS HIGH 50: CPT | Performed by: INTERNAL MEDICINE

## 2024-08-22 PROCEDURE — 71045 X-RAY EXAM CHEST 1 VIEW: CPT | Performed by: INTERNAL MEDICINE

## 2024-08-22 RX ORDER — DEXTROSE MONOHYDRATE AND SODIUM CHLORIDE 5; .9 G/100ML; G/100ML
INJECTION, SOLUTION INTRAVENOUS CONTINUOUS
Status: DISCONTINUED | OUTPATIENT
Start: 2024-08-22 | End: 2024-08-26

## 2024-08-22 RX ORDER — HYDROMORPHONE HYDROCHLORIDE 1 MG/ML
0.2 INJECTION, SOLUTION INTRAMUSCULAR; INTRAVENOUS; SUBCUTANEOUS ONCE
Status: COMPLETED | OUTPATIENT
Start: 2024-08-22 | End: 2024-08-22

## 2024-08-22 NOTE — PLAN OF CARE
Alert but very drowsy this shift. Increased pain, dilaudid proven effective. Q2hr turns. Dressing to sacrum changed per order. IVF infusing. Hypoglycemic this shift, protocol followed. Blue boots elevating heels off bed. Pressure points on body cushioned with mepilex foams. Could not tolerate PO meds today. Fentanyl patch changed, LUE. NC 0.5L. Not tolerating diet. Purewick in place. Call light remains in reach. Safety precautions maintained, bed alarm on, locked and in lowest position.     PT/OT orders entered; evals needed for further discussion of care and placement.     Problem: Delirium  Goal: Minimize duration of delirium  Description: Interventions:  - Encourage use of hearing aids, eye glasses  - Promote highest level of mobility daily  - Provide frequent reorientation  - Promote wakefulness i.e. lights on, blinds open  - Promote sleep, encourage patient's normal rest cycle i.e. lights off, TV off, minimize noise and interruptions  - Encourage family to assist in orientation and promotion of home routines  Outcome: Progressing

## 2024-08-22 NOTE — PROGRESS NOTES
[facility]  part of [facility]     Hospitalist Progress Note     Margaret Silverio Patient Status:  Inpatient    3/14/1945 MRN [id number]   Location [facility] POST ANESTHESIA CARE UNIT Attending Fernando Galan MD   Hosp Day # 10 PCP Johnathon Rodriguez, DO     Subjective:     Pt was seen and examined, awake, responding minimally to some questions occasionally. Denied any active complaints.   Hypoglycemic this a.m. Plan discussed with family at the bedside.       Objective:    Review of Systems:   ROS completed; pertinent positive and negatives stated in subjective.      Vital signs:  Temp:  [96.9 °F (36.1 °C)-97.9 °F (36.6 °C)] 96.9 °F (36.1 °C)  Pulse:  [59-64] 60  Resp:  [16] 16  BP: (114-144)/(55-71) 124/56  SpO2:  [96 %-99 %] 99 %      Physical Exam:    Gen: alert, oriented x1  Chest: good air entry CTABL  CVS: normal s1 and s2 RR  Abd: NABS soft NT ND  Neuro: non-focal, drowsy  Skin: decub dressing CDI  Ext: no edema in bilateral LE      Diagnostic Data:    Labs:  Recent Labs   Lab 24  0652 24  1247 24  0626 24  0555 24  0643   WBC 7.6 8.0 8.3 8.5 11.9*   HGB 9.4* 8.4* 8.1* 9.5* 8.4*   MCV 99.1 98.3 95.2 97.8 93.8   .0* 675.0* 712.0* 671.0* 658.0*   BAND  --  2 1 2 4       Recent Labs   Lab 24  0626 24  0555 24  0643   * 93 380*   BUN 59* 48* 46*   CREATSERUM 1.56* 1.43* 1.44*   CA 9.1 9.3 8.8   ALB 3.4 3.4 3.1*   * 132* 129*   K 4.6 5.1 4.2    102 102   CO2 22.0 22.0 20.0*   ALKPHO  --   --  155*   AST  --   --  17   ALT  --   --  <7*   BILT  --   --  0.4   TP  --   --  6.3       Estimated Creatinine Clearance: 27.7 mL/min (A) (based on SCr of 1.44 mg/dL (H)).    No results for input(s): \"PTP\", \"INR\" in the last 168 hours.           Imaging: Imaging data reviewed in Epic.    Medications:    heparin  5,000 Units Subcutaneous Q8H BROOKLYNN    acetaminophen  650 mg Oral Q8H    patiromer  8.4 g Oral Once     sacubitril-valsartan  1 tablet Oral BID    bumetanide  1 mg Oral Daily    metoprolol succinate  25 mg Oral Daily Beta Blocker    midodrine  5 mg Oral TID    levETIRAcetam  500 mg Intravenous Q24H    cefTRIAXone  1 g Intravenous Q24H    sodium hypochlorite   Topical BID    amiodarone  200 mg Oral Daily    fentaNYL  1 patch Transdermal Q72H    ferrous sulfate  325 mg Oral Daily with breakfast    folic acid  800 mcg Oral Daily    gabapentin  300 mg Oral TID    [Held by provider] isosorbide dinitrate  20 mg Oral TID (Nitrates)    pantoprazole  40 mg Oral BID AC       Assessment & Plan:     AMS -> intermittent   Unclear etiology, consider metabolic encephalopathy  Neurology following, EEG WNL  Keppra resumed  CT head non-acute  Abg reviewed  Hyponatremia  Na 129  Continue D5NS  Monitor labs  Sacral decubitus ulcer  Gsx on consult  Now s/p Excision of cyst, sharp excisional debridement of buttock decubitus ulcer POD #9  PRN pain control  Completed 10 days of abx  Leukocytosis  WBC 8.5->11.9  No fever curve, no obv source noted  Repeat cultures pending  UTI  UA reviewed  Ucx pending -> e. coli  Continue IV abx - completed course of treatment  NICM, HFrEF  Hx of paroxysmal afib  Hx of BRANDYN occlusion  AV paced  Continue home meds  GEORGE on CKD 3 - improved  Monitor renal function  Avoid nephrotoxic agents  Anemia  Hgb stable today  Cont to monitor   Thrombocytosis  Monitor labs  Heparin s/c for DVT ppx  Nutrition  Dietitian on consult  Encouraged to eat more  Family reports the patient would not want a feeding tube  GOC  Appreciate palliative care input    Plan of care discussed with RN at bedside       Goals of care: Palliative care has been consulted, discussions on going regarding DC plan, daughter will discuss with family before making a decision on Hospice care. Otherwise will dc with home palliative care.     Supplementary Documentation:     Quality:  DVT Prophylaxis: Heparin s/c  CODE status: DNAR/Select      Estimated  date of discharge: TBD  Discharge is dependent on: clinical stability  At this point Ms. Silverio is expected to be discharge to: TBD    MDM: High

## 2024-08-22 NOTE — PAYOR COMM NOTE
PLEASE GIVE RECONSIDERATION/APPROVAL TO THIS INPT ADMISSION    RECONSIDERATION REQUEST  **As a reminder, Medicare Advantage plans are instructed to provide, at a  minimum, the same coverage that a traditional Medicare beneficiary would  receive. Beyond any doubt, a traditional Medicare recipient in this same situation  would have received Medicare coverage for these inpatient medically necessary  services. **      CONTINUED STAY REVIEW    Payor: [employer]  Subscriber #:  [id number]  Authorization Number: [id number]    Admit date: 8/12/24  Admit time:  2:41 PM    REVIEW DOCUMENTATION:  8/21/2024:  Cardiology Progress Note   Tearful . Refused meds this AM  Denies cp, sob    /69 (BP Location: Right arm)  Pulse 59  Temp 97.5 °F (36.4 °C) (Temporal)  Resp 16  Wt 120 lb (54.4 kg)  SpO2 100%  BMI 20.60 kg/m²     8/21/2024 1146      Gross per 24 hour   Intake 5 ml   Output 550 ml   Net -545 ml       Lab 08/19/24  1247 08/20/24  0626 08/21/24  0555   GLU 81 100* 93   BUN 70* 59* 48*   CREATSERUM 1.89* 1.56* 1.43*   EGFRCR 27* 34* 38*   CA 9.5 9.1 9.3    134* 132*   K 5.8* 4.6 5.1    104 102   CO2 24.0 22.0 22.0      RBC 2.88* 2.73* 3.18*   HGB 8.4* 8.1* 9.5*   HCT 28.3* 26.0* 31.1*   MCV 98.3 95.2 97.8   MCH 29.2 29.7 29.9   MCHC 29.7* 31.2 30.5*   RDW 19.7* 19.5* 19.8*   NEPRELIM 4.79 5.13 4.44   WBC 8.0 8.3 8.5   .0* 712.0* 671.0*     Plan:  Pt more alert/awake today. Tearful at this time   Daughter at bedside discussing pt's wishes. She reports that pt \"isn't willing to give up\" but refused to eat or take her AM medications . Palliative care following . No decision on hospice at this time  Pt is now agreeable to take her medications after discussion with this APN   Compensated on exam . Monitor I/o ,daily wts & renal fx closely   Adriana Myers, DO    8/21/2024 11:51 AM     8/22/2024:  Hospitalist Progress Note   responding minimally to some questions occasionally. Denied any  active complaints.   Hypoglycemic this a.m    Temp:  [96.9 °F (36.1 °C)-97.9 °F (36.6 °C)] 96.9 °F (36.1 °C)  Pulse:  [59-64] 60  Resp:  [16] 16  BP: (114-144)/(55-71) 124/56  SpO2:  [96 %-99 %] 99 %    Lab 08/18/24  0652 08/19/24  1247 08/20/24  0626 08/21/24  0555 08/22/24  0643   WBC 7.6 8.0 8.3 8.5 11.9*   HGB 9.4* 8.4* 8.1* 9.5* 8.4*   MCV 99.1 98.3 95.2 97.8 93.8   .0* 675.0* 712.0* 671.0* 658.0*   BAND  --  2 1 2 4     Lab 08/20/24  0626 08/21/24  0555 08/22/24  0643   * 93 380*   BUN 59* 48* 46*   CREATSERUM 1.56* 1.43* 1.44*   CA 9.1 9.3 8.8   ALB 3.4 3.4 3.1*   * 132* 129*   K 4.6 5.1 4.2    102 102   CO2 22.0 22.0 20.0*   ALKPHO  --   --  155*   AST  --   --  17   ALT  --   --  <7*   BILT  --   --  0.4   TP  --   --  6.3      Assessment & Plan:  AMS -> intermittent   Unclear etiology, consider metabolic encephalopathy  Neurology following, EEG WNL  Keppra resumed  CT head non-acute  Abg reviewed  Hyponatremia  Na 129  Continue D5NS  Monitor labs  Sacral decubitus ulcer  Gsx on consult  Now s/p Excision of cyst, sharp excisional debridement of buttock decubitus ulcer POD #9  PRN pain control  Completed 10 days of abx  Leukocytosis  WBC 8.5->11.9  No fever curve, no obv source noted  Repeat cultures pending  UTI  UA reviewed  Ucx pending -> e. coli  Continue IV abx - completed course of treatment  NICM, HFrEF  Hx of paroxysmal afib  Hx of BRANDYN occlusion  AV paced  Continue home meds  GEORGE on CKD 3 - improved  Monitor renal function  Avoid nephrotoxic agents  Anemia  Hgb stable today  Cont to monitor   Thrombocytosis  Monitor labs  Heparin s/c for DVT ppx  Nutrition  Dietitian on consult  Encouraged to eat more  Family reports the patient would not want a feeding tube  GOC  Appreciate palliative care input     Plan of care discussed with RN at bedside      Goals of care: Palliative care has been consulted, discussions on going regarding DC plan, daughter will discuss with family  before making a decision on Hospice care. Otherwise will dc with home palliative care.     DVT Prophylaxis: Heparin s/c  CODE status: DNAR/Select    Estimated date of discharge: TBD  Discharge is dependent on: clinical stability  At this point is expected to be discharge to: TBD     MDM: Veto Escalante MD   8/22/2024 10:51 AM     MEDICATIONS ADMINISTERED:  acetaminophen (Tylenol) 160 MG/5ML oral liquid 650 mg       Date Action Dose Route User    8/22/2024 0007 Given 650 mg Oral Kari Higgins RN    8/21/2024 1808 Given 650 mg Oral ZhangKeyana RN          sodium hypochlorite (Dakin's) 0.125 % external solution       Date Action Dose Route User    8/22/2024 1131 Given (none) Topical Oksana Gregory RN    8/21/2024 2113 Given (none) Topical Kari Higgins RN          dextrose 5% infusion       Date Action Dose Route User    8/22/2024 1009 New Bag (none) Intravenous Oksana Gregory RN          dextrose 50% injection 50 mL       Date Action Dose Route User    8/22/2024 1131 Given 50 mL Intravenous Oksana Gregory RN    8/22/2024 1050 Given 50 mL Intravenous Oksana Gregory RN    8/22/2024 0634 Given 50 mL Intravenous Kari Higgins RN          dextrose 5%-sodium chloride 0.9% infusion       Date Action Dose Route User    8/22/2024 1128 New Bag (none) Intravenous Oksana Gregory RN          fentaNYL (Duragesic) 12 MCG/HR patch 1 patch       Date Action Dose Route User    8/22/2024 0955 Fentanyl Patch Applied 1 patch Transdermal (Left Upper Arm) Oksana Gregory RN          gabapentin (Neurontin) cap 300 mg       Date Action Dose Route User    8/21/2024 2113 Given 300 mg Oral Kari Higgins RN          heparin (Porcine) 5000 UNIT/ML injection 5,000 Units       Date Action Dose Route User    8/22/2024 1415 Given 5,000 Units Subcutaneous (Left Upper Arm) Oksana Gregory RN    8/22/2024 0622 Given 5,000 Units Subcutaneous (Left Lower Abdomen) Kari Higgins RN    8/21/2024 2113 Given  5,000 Units Subcutaneous (Left Lower Abdomen) Kari Higgins RN          HYDROcodone-acetaminophen (Norco)  MG per tab 1 tablet       Date Action Dose Route User    8/21/2024 2238 Given 1 tablet Oral Kari Higgins RN          HYDROmorphone (Dilaudid) 1 MG/ML injection 0.2 mg       Date Action Dose Route User    8/22/2024 1128 Given 0.2 mg Intravenous Oksana Gregory RN    8/22/2024 0819 Given 0.2 mg Intravenous Oksana Gregory RN          HYDROmorphone (Dilaudid) 1 MG/ML injection 0.2 mg       Date Action Dose Route User    8/22/2024 1227 Given 0.2 mg Intravenous Oksana Gregory RN          levETIRAcetam (Keppra) 500 mg/5mL injection 500 mg       Date Action Dose Route User    8/22/2024 1415 Given 500 mg Intravenous Oksana Gregory RN          metoprolol succinate ER (Toprol XL) 24 hr tab 25 mg       Date Action Dose Route User    8/22/2024 0622 Given 25 mg Oral Kari Higgins RN          midodrine (ProAmatine) tab 5 mg       Date Action Dose Route User    8/22/2024 0622 Given 5 mg Oral Kari Higgins RN    8/21/2024 1809 Given 5 mg Oral Keyana Zhang RN          pantoprazole (Protonix) DR tab 40 mg       Date Action Dose Route User    8/22/2024 0622 Given 40 mg Oral Kari Higgins RN    8/21/2024 1809 Given 40 mg Oral Keyana Zhang RN          sacubitril-valsartan (Entresto) 24-26 MG per tab 1 tablet       Date Action Dose Route User    8/21/2024 2113 Given 1 tablet Oral Kari Higgins RN     Vitals (last day)       Date/Time Temp Pulse Resp BP SpO2 Weight O2 Device O2 Flow Rate (L/min) [facility]    08/22/24 1550 97.1 °F (36.2 °C) 58 16 122/62 98 % -- Nasal cannula 0.5 L/min NT    08/22/24 0832 -- 60 16 124/56 99 % -- Nasal cannula 0.5 L/min NT    08/22/24 0618 96.9 °F (36.1 °C) 60 16 114/55 99 % -- Nasal cannula 0.5 L/min AS    08/21/24 2023 97.6 °F (36.4 °C) 59 16 133/69 99 % -- Nasal cannula 0.5 L/min CG    08/21/24 1527 97.7 °F (36.5 °C) 63 16 130/62 96 % -- Nasal  cannula 0.5 L/min LB    08/21/24 1341 97.9 °F (36.6 °C) 64 16 144/71 97 % -- Nasal cannula 0.5 L/min BB    08/21/24 0724 97.5 °F (36.4 °C) 59 16 124/69 100 % -- Nasal cannula 1 L/min LB    08/21/24 0600 98.1 °F (36.7 °C) 63 16 119/62 96 % -- Nasal cannula 1 L/min CG     Blood Transfusion Record       Product Unit Status Volume Start End            Transfuse RBC       24  [id number]  [id number] Stopped 307.5 mL 08/16/24 1100 08/16/24 1406           PLEASE GIVE RECONSIDERATION/APPROVAL TO THIS INPT ADMISSION

## 2024-08-22 NOTE — PLAN OF CARE
Patient is A&Ox3, drowsy at times,but more alert overnight. Patient was compliant with taking medications.Fentanyl patch on Chest. On 0.5L NC. IVF infusing. Q6 accuchecks. Daily weight. Decreased appetite. Voiding via purewick. Q2 turns. BG of 27, Dextrose 50% given, MD notified, rechecked after 15 min BG 83, orange juice was given, no new orders acquired from MD. Endorsed to Day shift Nurse to check BG again x2 hrs.   Wounds changed and cleansed. Max assist. Call light within reach, frequent rounding. Safety measures in place. Plan TBD.         Problem: Patient Centered Care  Goal: Patient preferences are identified and integrated in the patient's plan of care  Description: Interventions:  - What would you like us to know as we care for you?   - Provide timely, complete, and accurate information to patient/family  - Incorporate patient and family knowledge, values, beliefs, and cultural backgrounds into the planning and delivery of care  - Encourage patient/family to participate in care and decision-making at the level they choose  - Honor patient and family perspectives and choices  Outcome: Progressing     Problem: Patient/Family Goals  Goal: Patient/Family Long Term Goal  Description: Patient's Long Term Goal:     Interventions:  -   - See additional Care Plan goals for specific interventions  Outcome: Progressing  Goal: Patient/Family Short Term Goal  Description: Patient's Short Term Goal:     Interventions:     - See additional Care Plan goals for specific interventions  Outcome: Progressing     Problem: PAIN - ADULT  Goal: Verbalizes/displays adequate comfort level or patient's stated pain goal  Description: INTERVENTIONS:  - Encourage pt to monitor pain and request assistance  - Assess pain using appropriate pain scale  - Administer analgesics based on type and severity of pain and evaluate response  - Implement non-pharmacological measures as appropriate and evaluate response  - Consider cultural and  social influences on pain and pain management  - Manage/alleviate anxiety  - Utilize distraction and/or relaxation techniques  - Monitor for opioid side effects  - Notify MD/LIP if interventions unsuccessful or patient reports new pain  - Anticipate increased pain with activity and pre-medicate as appropriate  Outcome: Progressing     Problem: RISK FOR INFECTION - ADULT  Goal: Absence of fever/infection during anticipated neutropenic period  Description: INTERVENTIONS  - Monitor WBC  - Administer growth factors as ordered  - Implement neutropenic guidelines  Outcome: Progressing     Problem: SAFETY ADULT - FALL  Goal: Free from fall injury  Description: INTERVENTIONS:  - Assess pt frequently for physical needs  - Identify cognitive and physical deficits and behaviors that affect risk of falls.  - [place] fall precautions as indicated by assessment.  - Educate pt/family on patient safety including physical limitations  - Instruct pt to call for assistance with activity based on assessment  - Modify environment to reduce risk of injury  - Provide assistive devices as appropriate  - Consider OT/PT consult to assist with strengthening/mobility  - Encourage toileting schedule  Outcome: Progressing     Problem: SKIN/TISSUE INTEGRITY - ADULT  Goal: Skin integrity remains intact  Description: INTERVENTIONS  - Assess and document risk factors for pressure ulcer development  - Assess and document skin integrity  - Monitor for areas of redness and/or skin breakdown  - Initiate interventions, skin care algorithm/standards of care as needed  Outcome: Progressing  Goal: Incision(s), wounds(s) or drain site(s) healing without S/S of infection  Description: INTERVENTIONS:  - Assess and document risk factors for pressure ulcer development  - Assess and document skin integrity  - Assess and document dressing/incision, wound bed, drain sites and surrounding tissue  - Implement wound care per orders  - Initiate isolation precautions  as appropriate  - Initiate Pressure Ulcer prevention bundle as indicated  Outcome: Progressing  Goal: Oral mucous membranes remain intact  Description: INTERVENTIONS  - Assess oral mucosa and hygiene practices  - Implement preventative oral hygiene regimen  - Implement oral medicated treatments as ordered  Outcome: Progressing     Problem: Delirium  Goal: Minimize duration of delirium  Description: Interventions:  - Encourage use of hearing aids, eye glasses  - Promote highest level of mobility daily  - Provide frequent reorientation  - Promote wakefulness i.e. lights on, blinds open  - Promote sleep, encourage patient's normal rest cycle i.e. lights off, TV off, minimize noise and interruptions  - Encourage family to assist in orientation and promotion of home routines  Outcome: Progressing

## 2024-08-22 NOTE — PROGRESS NOTES
[facility] Cardiology Progress Note    Margaret Silverio Patient Status:  Inpatient    3/14/1945 MRN [id number]   Location [facility] 4W/SW/SE Attending Veto Zhang MD   Hosp Day # 10 PCP Johnathon Rodriguez DO     Subjective:  Hypoglycemic this AM. Drowsy  Denies cp, sob     Objective:  /56 (BP Location: Right arm)   Pulse 60   Temp 96.9 °F (36.1 °C) (Axillary)   Resp 16   Wt 120 lb (54.4 kg)   SpO2 99%   BMI 20.60 kg/m²     Telemetry: Not monitored      Intake/Output:    Intake/Output Summary (Last 24 hours) at 2024 1113  Last data filed at 2024 0617  Gross per 24 hour   Intake 5 ml   Output 1100 ml   Net -1095 ml       Last 3 Weights   24 1511 120 lb (54.4 kg)   24 0859 120 lb (54.4 kg)   24 1100 112 lb 1.6 oz (50.8 kg)   24 0355 115 lb 3.2 oz (52.3 kg)   24 0620 109 lb 6.4 oz (49.6 kg)   24 0450 112 lb 11.2 oz (51.1 kg)   24 0458 113 lb 9.6 oz (51.5 kg)   24 0508 117 lb 14.4 oz (53.5 kg)   24 0614 123 lb 12.8 oz (56.2 kg)   07/15/24 1830 124 lb 1.6 oz (56.3 kg)       Labs:  Recent Labs   Lab 24  0626 24  0555 24  0643   * 93 380*   BUN 59* 48* 46*   CREATSERUM 1.56* 1.43* 1.44*   EGFRCR 34* 38* 37*   CA 9.1 9.3 8.8   * 132* 129*   K 4.6 5.1 4.2    102 102   CO2 22.0 22.0 20.0*     Recent Labs   Lab 24  0626 24  0555 24  0643   RBC 2.73* 3.18* 2.76*   HGB 8.1* 9.5* 8.4*   HCT 26.0* 31.1* 25.9*   MCV 95.2 97.8 93.8   MCH 29.7 29.9 30.4   MCHC 31.2 30.5* 32.4   RDW 19.5* 19.8* 19.5*   NEPRELIM 5.13 4.44 7.99*   WBC 8.3 8.5 11.9*   .0* 671.0* 658.0*         No results for input(s): \"TROP\", \"TROPHS\", \"CK\" in the last 168 hours.    Diagnostics:         Review of Systems   Respiratory: Negative.     Cardiovascular: Negative.      Physical Exam:    General: Lethargic. No apparent distress. Ill appearing   HEENT: Normocephalic, anicteric sclera, neck supple, no  thyromegaly or adenopathy.  Neck: No JVD, carotids 2+, no bruits.  Cardiac: Regular rate & rhythm. S1, S2 normal. No pericardial rub, S3, or extra cardiac sounds. 2/6 ALY  Lungs: Clear without wheezes, rales, rhonchi or dullness.  Normal excursions and effort.  Abdomen: Soft, non-tender. No organosplenomegally, mass or rebound, BS-present.  Extremities: Without clubbing or cyanosis.  No left lower extremity edema, no right lower extremity edema.  Neurologic: Alert and oriented, normal affect. No focal defects  Skin: Warm and dry.       Medications:   heparin  5,000 Units Subcutaneous Q8H BROOKLYNN    acetaminophen  650 mg Oral Q8H    patiromer  8.4 g Oral Once    sacubitril-valsartan  1 tablet Oral BID    bumetanide  1 mg Oral Daily    metoprolol succinate  25 mg Oral Daily Beta Blocker    midodrine  5 mg Oral TID    levETIRAcetam  500 mg Intravenous Q24H    sodium hypochlorite   Topical BID    amiodarone  200 mg Oral Daily    fentaNYL  1 patch Transdermal Q72H    ferrous sulfate  325 mg Oral Daily with breakfast    folic acid  800 mcg Oral Daily    gabapentin  300 mg Oral TID    [Held by provider] isosorbide dinitrate  20 mg Oral TID (Nitrates)    pantoprazole  40 mg Oral BID AC      dextrose 5%-sodium chloride 0.9%         Assessment:    Sacral decubitus ulcer s/p excision of cyst, excisional debridement 8/13  Mgmt per surgery  Chronic HFrEF, NICM, Severe Wide-Open TR  Echo 6/2024 - LVEF 30-35%, dyskinesis of anteroseptal, anterior walls, biatrial dilation, mild AVS, wide open TR, PASP 48mmHg  [facility] 2022 w/non obs disease  GDMT - multiple doses have been held d/t lethargy, low BP during admit  Coreg now changed to toprol d/t lower BP  Entresto decreased dosing to 24/26 po BID  Spironolactone 25mg po daily - on hold d/t GFR  Farxiga 10mg po daily - on hold  Isordil 20mg po TID - on hold  Bumex 1mg po daily   Appears fairly compensated on exam  Hypotension - improved; midodrine 5mg po TID  Altered Mental Status, Toxic  Metabolic vs Infectious  EEG w/o seizure activity - on [place]  Neuro following  GEORGE on CKD  BUN/Cr downtrending  Acute on Chronic Anemia  Hgb 8.4  Rec'd PRBC on 8/16    UTI  Urine culture +E Coli   On IV antibx  Paroxysmal Atrial Fibrillation s/p [place] Dual Chamber PPM/ICD - 9/2023  On amiodarone 200mg po daily  Formerly on Eliquis - no longer on AC d/t Hx severe GIB  Hx Bioprosthetic Mitral Valve Replacement  Severe RA - methotrexate at home  Hx Recurrent GIB, AVM s/p clip - 4/2023    Plan:    Hypoglycemic this AM. Lethargic even after treatment. Denies angina , sob  Remains compensated on exam  Scr stable. Continue GDMT if pt awake enough to take medications   Monitor I/o ,daily wts & renal fx closely   Palliative care following. Decision regarding hospice care is pending       YOLIE Bucio  8/22/2024  7:35 AM  Ph [phone] ([place])  Ph [phone] ([place])

## 2024-08-23 ENCOUNTER — APPOINTMENT (OUTPATIENT)
Dept: WOUND CARE | Facility: HOSPITAL | Age: 79
End: 2024-08-23
Attending: NURSE PRACTITIONER
Payer: MEDICARE

## 2024-08-23 LAB
ALBUMIN SERPL-MCNC: 3.2 G/DL (ref 3.2–4.8)
ALBUMIN/GLOB SERPL: 0.9 {RATIO} (ref 1–2)
ALP LIVER SERPL-CCNC: 154 U/L
ALT SERPL-CCNC: <7 U/L
ANION GAP SERPL CALC-SCNC: 8 MMOL/L (ref 0–18)
AST SERPL-CCNC: 15 U/L (ref ?–34)
BASOPHILS # BLD: 0 X10(3) UL (ref 0–0.2)
BASOPHILS NFR BLD: 0 %
BILIRUB SERPL-MCNC: 0.5 MG/DL (ref 0.2–1.1)
BUN BLD-MCNC: 42 MG/DL (ref 9–23)
BUN/CREAT SERPL: 26.6 (ref 10–20)
CALCIUM BLD-MCNC: 9.1 MG/DL (ref 8.7–10.4)
CHLORIDE SERPL-SCNC: 104 MMOL/L (ref 98–112)
CO2 SERPL-SCNC: 21 MMOL/L (ref 21–32)
CREAT BLD-MCNC: 1.58 MG/DL
DEPRECATED RDW RBC AUTO: 66.4 FL (ref 35.1–46.3)
EGFRCR SERPLBLD CKD-EPI 2021: 33 ML/MIN/1.73M2 (ref 60–?)
EOSINOPHIL # BLD: 0.15 X10(3) UL (ref 0–0.7)
EOSINOPHIL NFR BLD: 1 %
ERYTHROCYTE [DISTWIDTH] IN BLOOD BY AUTOMATED COUNT: 19.5 % (ref 11–15)
GLOBULIN PLAS-MCNC: 3.4 G/DL (ref 2–3.5)
GLUCOSE BLD-MCNC: 80 MG/DL (ref 70–99)
GLUCOSE BLDC GLUCOMTR-MCNC: 108 MG/DL (ref 70–99)
GLUCOSE BLDC GLUCOMTR-MCNC: 108 MG/DL (ref 70–99)
GLUCOSE BLDC GLUCOMTR-MCNC: 76 MG/DL (ref 70–99)
GLUCOSE BLDC GLUCOMTR-MCNC: 85 MG/DL (ref 70–99)
GLUCOSE BLDC GLUCOMTR-MCNC: 96 MG/DL (ref 70–99)
HCT VFR BLD AUTO: 29.5 %
HGB BLD-MCNC: 8.9 G/DL
LYMPHOCYTES NFR BLD: 1.36 X10(3) UL (ref 1–4)
LYMPHOCYTES NFR BLD: 9 %
MCH RBC QN AUTO: 29.5 PG (ref 26–34)
MCHC RBC AUTO-ENTMCNC: 30.2 G/DL (ref 31–37)
MCV RBC AUTO: 97.7 FL
METAMYELOCYTES # BLD: 0.15 X10(3) UL
METAMYELOCYTES NFR BLD: 1 %
MONOCYTES # BLD: 1.96 X10(3) UL (ref 0.1–1)
MONOCYTES NFR BLD: 13 %
MYELOCYTES # BLD: 0.3 X10(3) UL
MYELOCYTES NFR BLD: 2 %
NEUTROPHILS # BLD AUTO: 8.08 X10 (3) UL (ref 1.5–7.7)
NEUTROPHILS NFR BLD: 69 %
NEUTS BAND NFR BLD: 5 %
NEUTS HYPERSEG # BLD: 11.17 X10(3) UL (ref 1.5–7.7)
OSMOLALITY SERPL CALC.SUM OF ELEC: 285 MOSM/KG (ref 275–295)
PLATELET # BLD AUTO: 565 10(3)UL (ref 150–450)
PLATELET MORPHOLOGY: NORMAL
POTASSIUM SERPL-SCNC: 4.4 MMOL/L (ref 3.5–5.1)
PROT SERPL-MCNC: 6.6 G/DL (ref 5.7–8.2)
RBC # BLD AUTO: 3.02 X10(6)UL
SODIUM SERPL-SCNC: 133 MMOL/L (ref 136–145)
TOTAL CELLS COUNTED BLD: 100
WBC # BLD AUTO: 15.1 X10(3) UL (ref 4–11)

## 2024-08-23 PROCEDURE — 99233 SBSQ HOSP IP/OBS HIGH 50: CPT | Performed by: INTERNAL MEDICINE

## 2024-08-23 PROCEDURE — 99231 SBSQ HOSP IP/OBS SF/LOW 25: CPT | Performed by: NURSE PRACTITIONER

## 2024-08-23 RX ORDER — PREDNISONE 20 MG/1
20 TABLET ORAL
Status: DISCONTINUED | OUTPATIENT
Start: 2024-08-24 | End: 2024-08-24

## 2024-08-23 RX ORDER — ACETAMINOPHEN 10 MG/ML
1000 INJECTION, SOLUTION INTRAVENOUS ONCE
Status: COMPLETED | OUTPATIENT
Start: 2024-08-23 | End: 2024-08-23

## 2024-08-23 RX ORDER — LEVETIRACETAM 500 MG/1
500 TABLET ORAL DAILY
Status: DISCONTINUED | OUTPATIENT
Start: 2024-08-23 | End: 2024-08-23

## 2024-08-23 RX ORDER — LEVETIRACETAM 500 MG/5ML
500 INJECTION, SOLUTION, CONCENTRATE INTRAVENOUS DAILY
Status: DISCONTINUED | OUTPATIENT
Start: 2024-08-24 | End: 2024-09-06

## 2024-08-23 RX ORDER — HYDROCODONE BITARTRATE AND ACETAMINOPHEN 10; 325 MG/1; MG/1
1 TABLET ORAL EVERY 4 HOURS PRN
Status: DISCONTINUED | OUTPATIENT
Start: 2024-08-23 | End: 2024-09-10

## 2024-08-23 NOTE — PALLIATIVE CARE NOTE
[facility]  part of [facility]  Palliative Care Progress Note    Margaret Silverio Patient Status:  Inpatient    3/14/1945 MRN [id number]   Location [facility] 4W/SW/SE Attending Fernando Galan MD   Hosp Day # 11 PCP Johnathon Rodriguez,      The  Cures Act makes medical notes like these available to patients in the interest of transparency. Please be advised this is a medical document. Medical documents are intended to carry relevant information, facts as evident, and the clinical opinion of the practitioner. The medical note is intended as peer to peer communication and may appear blunt or direct. It is written in medical language and may contain abbreviations or verbiage that are unfamiliar.     Subjective     When I entered the room, the patient was in the bed, she is awake and alert. RN is feeding her breakfast cereal. She continues to experience pain at her sacral decubiti. She is scrunching her forehead, Fentayl patch on. She did receive a Norco 10/325 X1 earlier 0539. Dilaudid 0.2mg ivp X2. There were no family present at the bedside.       Review of pertinent medication requirements in past 24 hours:  Dilaudid 0.2mg ivp X2, Fentanyl patch 12mcg, Norco 10/325 X1    Palliative Care symptom needs assessed:     Co/ pain in buttock where decubiti is present  Taking in some breakfast cereal with assistance of RN    Allergies:  Allergies   Allergen Reactions    Lisinopril Coughing       Medications:     Current Facility-Administered Medications:     HYDROcodone-acetaminophen (Norco)  MG per tab 1 tablet, 1 tablet, Oral, Q4H PRN    dextrose 5%-sodium chloride 0.9% infusion, , Intravenous, Continuous    heparin (Porcine) 5000 UNIT/ML injection 5,000 Units, 5,000 Units, Subcutaneous, Q8H [place]    HYDROmorphone (Dilaudid) 1 MG/ML injection 0.2 mg, 0.2 mg, Intravenous, Q4H PRN **OR** [DISCONTINUED] HYDROmorphone (Dilaudid) 1 MG/ML injection 0.4 mg, 0.4 mg,  Intravenous, Q3H PRN **OR** [DISCONTINUED] HYDROmorphone (Dilaudid) 1 MG/ML injection 0.8 mg, 0.8 mg, Intravenous, Q3H PRN    acetaminophen (Tylenol) 160 MG/5ML oral liquid 650 mg, 650 mg, Oral, Q8H    senna-docusate (Senokot-S) 8.6-50 MG per tab 2 tablet, 2 tablet, Oral, Daily PRN    polyethylene glycol (PEG 3350) (Miralax) 17 g oral packet 17 g, 17 g, Oral, Daily PRN    patiromer (Veltassa) 8.4 g oral packet 8.4 g, 8.4 g, Oral, Once    glucose (Dex4) 15 GM/59ML oral liquid 15 g, 15 g, Oral, Q15 Min PRN **OR** glucose (Glutose) 40% oral gel 15 g, 15 g, Oral, Q15 Min PRN **OR** glucose-vitamin C (Dex-4) chewable tab 4 tablet, 4 tablet, Oral, Q15 Min PRN **OR** dextrose 50% injection 50 mL, 50 mL, Intravenous, Q15 Min PRN **OR** glucose (Dex4) 15 GM/59ML oral liquid 30 g, 30 g, Oral, Q15 Min PRN **OR** glucose (Glutose) 40% oral gel 30 g, 30 g, Oral, Q15 Min PRN **OR** glucose-vitamin C (Dex-4) chewable tab 8 tablet, 8 tablet, Oral, Q15 Min PRN    traMADol (Ultram) tab 50 mg, 50 mg, Oral, Q12H PRN    sacubitril-valsartan (Entresto) 24-26 MG per tab 1 tablet, 1 tablet, Oral, BID    bumetanide (Bumex) tab 1 mg, 1 mg, Oral, Daily    metoprolol succinate ER (Toprol XL) 24 hr tab 25 mg, 25 mg, Oral, Daily Beta Blocker    midodrine (ProAmatine) tab 5 mg, 5 mg, Oral, TID    levETIRAcetam (Keppra) 500 mg/5mL injection 500 mg, 500 mg, Intravenous, Q24H    ondansetron (Zofran) 4 MG/2ML injection 4 mg, 4 mg, Intravenous, Q6H PRN    metoclopramide (Reglan) 5 mg/mL injection 5 mg, 5 mg, Intravenous, Q8H PRN    sodium hypochlorite (Dakin's) 0.125 % external solution, , Topical, BID    acetaminophen (Tylenol Extra Strength) tab 500 mg, 500 mg, Oral, Q4H PRN    amiodarone (Pacerone) tab 200 mg, 200 mg, Oral, Daily    fentaNYL (Duragesic) 12 MCG/HR patch 1 patch, 1 patch, Transdermal, Q72H    ferrous sulfate DR tab 325 mg, 325 mg, Oral, Daily with breakfast    folic acid (Folvite) tab 800 mcg, 800 mcg, Oral, Daily    gabapentin  (Neurontin) cap 300 mg, 300 mg, Oral, TID    [Held by provider] isosorbide dinitrate (Isordil) tab 20 mg, 20 mg, Oral, TID (Nitrates)    pantoprazole (Protonix) DR tab 40 mg, 40 mg, Oral, BID AC    Objective     Vital Signs:  Blood pressure 114/54, pulse 59, temperature 97.8 °F (36.6 °C), temperature source Temporal, resp. rate 16, weight 120 lb (54.4 kg), SpO2 100%.  Body mass index is 20.6 kg/m².  Present Level of pain: \"hurting\"   Non-verbal signs of pain present: No    Physical Exam:  General: Margaret is in the bed she is awake and alert, talking is very weak on exam, she appears elderly, cachetic and frail.   HEENT: dry oral MM  Cardiac: + murmer regular rate and rhythm, S1, S2 normal, no rub or gallop.  Lungs: clear without wheezes.  Normal excursions and effort.2 liters NC  Abdomen: Soft, flat non-tender, + bowel sounds, no rebound or guarding, + BM  Extremities: Without clubbing, cyanosis. BLE Edema not present  Neurologic: more awake today,  follows simple commands, flat affect  Skin: Warm and dry.  + sacral wound see wound care note     Prior to admission Palliative performance scale PPSv2 (%): 30    Hematology:  Lab Results   Component Value Date    WBC 15.1 (H) 08/23/2024    HGB 8.9 (L) 08/23/2024    HCT 29.5 (L) 08/23/2024    .0 (H) 08/23/2024       Coags:  Lab Results   Component Value Date    INR 1.25 (H) 02/09/2024    PTT 35.2 02/09/2024       Chemistry:  Lab Results   Component Value Date    CREATSERUM 1.58 (H) 08/23/2024    BUN 42 (H) 08/23/2024     (L) 08/23/2024    K 4.4 08/23/2024     08/23/2024    CO2 21.0 08/23/2024    GLU 80 08/23/2024    CA 9.1 08/23/2024    ALB 3.2 08/23/2024    ALKPHO 154 (H) 08/23/2024    BILT 0.5 08/23/2024    TP 6.6 08/23/2024    AST 15 08/23/2024    ALT <7 (L) 08/23/2024    MG 2.1 08/21/2024    PHOS 2.9 08/21/2024       Imaging:  XR CHEST AP PORTABLE  (CPT=71045)    Result Date: 8/22/2024  CONCLUSION: No new abnormality.  Persistent pulmonary edema  and bilateral pleural effusions, left greater than right, with questionable loculation of the right pleural effusion.    Dictated by (CST): Amandeep Fowler MD on 8/22/2024 at 12:13 PM     Finalized by (CST): Amandeep Fowler MD on 8/22/2024 at 12:14 PM          XR CHEST AP PORTABLE  (CPT=71045)    Result Date: 8/21/2024  CONCLUSION:  No significant interval change since previous exam. 1.  Cardiomegaly.  Post aortic valve prosthesis.  Post cardiac pacing device/ICD. 2.  Small-moderate sized bilateral pleural effusions, larger on the left.  Bibasilar pulmonary opacities likely representing secondary compressive atelectasis.  Interstitial edema.     Dictated by (CST): Maxx Enriquez MD on 8/21/2024 at 3:56 PM     Finalized by (CST): Maxx Enriquez MD on 8/21/2024 at 3:58 PM             Summary of Discussion    8/23/24 followed up with pt today, she is awake and alert. RN is feeding her breakfast cereal. Pt c/o pain in sacral decubiti with scrunched forehead. Discussed with RN use of Norco and holding for any drowsiness. Will have PT/OT try to work with pt eval and treat. Will need to continue to see how she progresses. Ongoing GOC will be needed pending her progress.     8/21/24 followed up with Margaret today she is more awake on exam, her dtr Barbi is present at the bedside, Margaret has been refusing to eat, she states the food does not taste good. Barbi stated her father brought in some soup from home a day ago and she ate the soup. Discussed bringing in food from home that she likes to eat, discussed with Margaret need for nutritional protein to help with wound healing and general healing.   I explored with Margaret if she is feeling depressed she denied depression. Discussed use of Remeron to help with appetite, there is concern for increased drowsiness with the Remeron, discussed use of Cymbalta, pt declines antidepressant at this time.   I explored with Margaret if she wanted feeding tube she stated no she would not want  feeding tube.   Barbi stated she asked her mother if she wanted to die as she is not taking her medications nor eating well.  Margaret stated she is not ready to die.   I discussed use of the Norco as needed  as pt expressed not wanting to be too sleepy however she continues to experience a lot pain in her buttock area. She has not taken anything for pain since last night and has been refusing to take the scheduled tylenol. We discussed this and I asked the RN to provide dose of Norco. I discussed with pt and dtr to call if needed I will follow up on Friday as I am off tomorrow. One of my coworkers will follow up tomorrow if needed.     8/20/24 Followed up with Margaret today, she is not responsive for me on exam, tries to open her eyes when asked. vital signs stable, she did receive 2 doses of the 0.4mg Dilaudid over the last 24 hours. I spoke with her dtr Barbi expressed concern over managing her pain and trying to keep her awake and alert to eat and participate in care.   Barbi stated her mother experienced this last week where she was not responsive.   Rn reported pt was crying overnight due to pain  Will continue with the Fentanyl patch as that is not new. Decrease the Dilaudid to 0.2mg every 4 hours and will monitor. discussed with dtr Barbi.       Assessment and Recommendation      Pressure injury of skin of sacral region, unspecified injury stage    S/P I&D 8/13/24    Pancytopenia    Non ischemic Cardiomyopathy    SSS PPM/AICD    Afib    Urinary tract infection without hematuria,    Anemia    Encephalopathy    CKD    H/o GIB    Frequent hospitalizations    H/o hypotension     Pain-concern of increased drowsiness will monitor pt. -pt has been refusing to take medications, I discussed this with her.   -continue with scheduled Tylenol 650mg every 8 hours-pt has been refusing   -Continue with Fentanyl patch 12mcg  -stopped the morphine due to kidney function  -Dilaudid 0.2mg every 4 hours as needed for  more severe pain  -continue with gabapentin 300mg TID  -stop the Flexeril nightly  -Norco 10/325 as needed every 4 hours-hold for any drowsiness.      Poor appetite  -offered Remeron daily for depression and appetite, pt declines this at this time.   Offered Cymbalta she declined      Bowel regimen  -add senna daily as needed  -Miralax daily as needed     Goals of care counseling  -see above for details  -Pt is DNAR/DNI with selective treatment  -Agreeable to palliative care following  -Dispo: TBD. Dtr is agreeable to CPC. SW to help with dc planning.   - not requested.   -Margaret stated she would not want feeding tube.   -ongoing GOC discussions will be needed over time.  -pts dtr Barbi will talk with family about getting hospice informational session while here.   -Provided emotional support to pt/family who are coping adequately     Advance care planning  -see above for details  -Pt's dtr Barbi Silverio is HCPOA [phone]  -HCPOA and POLST pw are on file in [employer].      Palliative Performance Scale 30%     Discussed today's visit with Dr Zhang and Keyana GIL    Palliative Care Follow Up: Palliative care team will continue to follow. Feel free to contact our team with any questions or concerns.    Thank you for allowing Palliative Care services to participate in the care of Margaret Silverio.    A total of 25 minutes were spent on this follow-up, which included all of the following: chart review, direct face to face contact, history taking, physical examination, counseling and coordinating care, and documentation.     Macy Marrufo, [id number]  8/23/2024  10:15AM  Palliative Care Services

## 2024-08-23 NOTE — PLAN OF CARE
Patient has safety precautions in place bed in the lowest postion, bed alarm set, and call light within reach. Plan of care ongoing. No futher concerns as of present.   Problem: Patient Centered Care  Goal: Patient preferences are identified and integrated in the patient's plan of care  Description: Interventions:  - Provide timely, complete, and accurate information to patient/family  - Incorporate patient and family knowledge, values, beliefs, and cultural backgrounds into the planning and delivery of care  - Encourage patient/family to participate in care and decision-making at the level they choose  - Honor patient and family perspectives and choices  Outcome: Not Progressing       Problem: PAIN - ADULT  Goal: Verbalizes/displays adequate comfort level or patient's stated pain goal  Description: INTERVENTIONS:  - Encourage pt to monitor pain and request assistance  - Assess pain using appropriate pain scale  - Administer analgesics based on type and severity of pain and evaluate response  - Implement non-pharmacological measures as appropriate and evaluate response  - Consider cultural and social influences on pain and pain management  - Manage/alleviate anxiety  - Utilize distraction and/or relaxation techniques  - Monitor for opioid side effects  - Notify MD/LIP if interventions unsuccessful or patient reports new pain  - Anticipate increased pain with activity and pre-medicate as appropriate  Outcome: Not Progressing     Problem: RISK FOR INFECTION - ADULT  Goal: Absence of fever/infection during anticipated neutropenic period  Description: INTERVENTIONS  - Monitor WBC  - Administer growth factors as ordered  - Implement neutropenic guidelines  Outcome: Not Progressing     Problem: SAFETY ADULT - FALL  Goal: Free from fall injury  Description: INTERVENTIONS:  - Assess pt frequently for physical needs  - Identify cognitive and physical deficits and behaviors that affect risk of falls.  - [place] fall  precautions as indicated by assessment.  - Educate pt/family on patient safety including physical limitations  - Instruct pt to call for assistance with activity based on assessment  - Modify environment to reduce risk of injury  - Provide assistive devices as appropriate  - Consider OT/PT consult to assist with strengthening/mobility  - Encourage toileting schedule  Outcome: Not Progressing     Problem: SKIN/TISSUE INTEGRITY - ADULT  Goal: Skin integrity remains intact  Description: INTERVENTIONS  - Assess and document risk factors for pressure ulcer development  - Assess and document skin integrity  - Monitor for areas of redness and/or skin breakdown  - Initiate interventions, skin care algorithm/standards of care as needed  Outcome: Not Progressing  Goal: Incision(s), wounds(s) or drain site(s) healing without S/S of infection  Description: INTERVENTIONS:  - Assess and document risk factors for pressure ulcer development  - Assess and document skin integrity  - Assess and document dressing/incision, wound bed, drain sites and surrounding tissue  - Implement wound care per orders  - Initiate isolation precautions as appropriate  - Initiate Pressure Ulcer prevention bundle as indicated  Outcome: Not Progressing     Problem: Delirium  Goal: Minimize duration of delirium  Description: Interventions:  - Encourage use of hearing aids, eye glasses  - Promote highest level of mobility daily  - Provide frequent reorientation  - Promote wakefulness i.e. lights on, blinds open  - Promote sleep, encourage patient's normal rest cycle i.e. lights off, TV off, minimize noise and interruptions  - Encourage family to assist in orientation and promotion of home routines  Outcome: Not Progressing     Problem: SKIN/TISSUE INTEGRITY - ADULT  Goal: Oral mucous membranes remain intact  Description: INTERVENTIONS  - Assess oral mucosa and hygiene practices  - Implement preventative oral hygiene regimen  - Implement oral medicated  treatments as ordered  Outcome: Progressing

## 2024-08-23 NOTE — OCCUPATIONAL THERAPY NOTE
OCCUPATIONAL THERAPY EVALUATION - INPATIENT     Room Number: 432/432-A  Evaluation Date: 8/23/2024  Type of Evaluation: Initial  Presenting Problem: sacral wound    Physician Order: IP Consult to Occupational Therapy  Reason for Therapy: ADL/IADL Dysfunction and Discharge Planning    OCCUPATIONAL THERAPY ASSESSMENT   Patient is a 79 year old female admitted 8/12/2024 for sacral wound.  Prior to admission, patient's baseline is assisted for ADLs, declining mobility status d/t sacral wound.  Patient is currently functioning below baseline with all care and mobility.    PLAN  Patient has been evaluated and presents with no skilled Occupational Therapy needs  at this time.  Patient will be discharged from Occupational Therapy services. Please re-order if a new functional limitation presents during this admission.         OCCUPATIONAL THERAPY MEDICAL/SOCIAL HISTORY   Problem List  Principal Problem:    Pressure injury of skin of sacral region, unspecified injury stage  Active Problems:    Pancytopenia (HCC)    Cardiomyopathy, unspecified (HCC)    Urinary tract infection without hematuria, site unspecified    Anemia, unspecified type    Encephalopathy    Goals of care, counseling/discussion    Palliative care encounter    Pain    HOME SITUATION  Type of Home: House  Home Layout: Able to live on main level  Lives With: Spouse  Drives: No  Patient Regularly Uses: Glasses    Stairs in Home: house; remains on main floor    Prior Level of [place]: PTA pt was assisted with ADLs and mobility from spouse. Note declining mobility with little OOB activity most recently d/t sacral wound, pain.     SUBJECTIVE  Pt moans with little intelligible verbalization; was able to state her spouse's name      OCCUPATIONAL THERAPY EXAMINATION    OBJECTIVE  Precautions: -- (skin integrity)  Fall Risk: High fall risk    PAIN ASSESSMENT  Rating: Unable to rate (moaning with indication of severe pain)  Location: sacrum  Management Techniques:  Relaxation; Repositioning    ACTIVITY TOLERANCE  Poor  Pt does not tolerate any movement, moaning in pain    O2 SATURATIONS  96% at rest on 1 LO2    COGNITION  Pt remains awake throughout session, eyes open however unable to engage pt in verbally responding to questions, did not attempt to participate with any requested AROM for UE/LEs    Communication: moans; mouth remains agape, appears dry. Attempted to moisten mouth to allow for more clear verbalization though pt with only minimal attempts     Behavioral/Emotional/Social: appears distressed with any attempt to perform movement/re positioning    RANGE OF MOTION   JÚNIOR UE ROM limited with arthritic deformities JÚNIOR hands,  wrists and limited ROM with flexion contractures at JÚNIOR elbows, very limited JÚNIOR SHD ROM      ACTIVITIES OF DAILY LIVING ASSESSMENT  AM-PAC ‘6-Clicks’ Inpatient Daily Activity Short Form  How much help from another person does the patient currently need…  -   Putting on and taking off regular lower body clothing?: Total  -   Bathing (including washing, rinsing, drying)?: Total  -   Toileting, which includes using toilet, bedpan or urinal? : Total  -   Putting on and taking off regular upper body clothing?: Total  -   Taking care of personal grooming such as brushing teeth?: Total  -   Eating meals?: Total    AM-PAC Score:  Score: 6  Approx Degree of Impairment: 100%  Standardized Score (AM-PAC Scale): 17.07  CMS Modifier (G-Code): CN    FUNCTIONAL TRANSFER ASSESSMENT  Not appropriate as pt tolerates any movement poorly    BED MOBILITY  Rolling: Dependent (Pt is dependent for all repositioning in supine)  Supine to Sit : -- (Not Appropriate)    FUNCTIONAL ADL ASSESSMENT  Dependent for all care    Skilled Therapy Provided: Pt received positioned for pressure relief, no visitors present. Pt remained with eyes open with little to no attempt to verbally respond to questions. Pt made no attempt to engage in AROM of either UE/LE. Pt requires total care  for ADLs and dependent for all movement/repositioning. Pt appeared to be in severe pain with any movement. Pt is not appropriate for skilled OT services and will be discharged from OT at this time. Should circumstances change, please re order OT    EDUCATION PROVIDED  Patient: Role of Occupational Therapy  Patient's Response to Education: Does Not Demonstrate Skills Needed for Learning; Demonstrates Disinterest    The patient's Approx Degree of Impairment: 100% has been calculated based on documentation in the [facility] '6 clicks' Inpatient Daily Activity Short Form.  Research supports that patients with this level of impairment may benefit from LTC/24 hour care.  Final disposition will be made by interdisciplinary medical team.    Patient End of Session: In bed;Call light within reach      Patient Evaluation Complexity Level:   Occupational Profile/Medical History MODERATE - Expanded review of history including review of medical or therapy record   Specific performance deficits impacting engagement in ADL/IADL HIGH  5+ performance deficits    Client Assessment/Performance Deficits LOW - No comorbidities nor modifications of tasks    Clinical Decision Making LOW - Analysis of occupational profile, problem-focused assessments, limited treatment options    Overall Complexity LOW

## 2024-08-23 NOTE — PROGRESS NOTES
Discharge Instructions    Discharged to home by car with relative. Discharged via wheelchair, hospital escort: Yes.  Special equipment needed: Not Applicable    Be sure to schedule a follow-up appointment with your primary care doctor or any specialists as instructed.     Discharge Plan:   Diet Plan: Discussed  Activity Level: Discussed  Confirmed Follow up Appointment: Patient to Call and Schedule Appointment  Confirmed Symptoms Management: Discussed  Medication Reconciliation Updated: Yes  Influenza Vaccine Indication: Patient Refuses    I understand that a diet low in cholesterol, fat, and sodium is recommended for good health. Unless I have been given specific instructions below for another diet, I accept this instruction as my diet prescription.   Other diet: Regular    Special Instructions: None    · Is patient discharged on Warfarin / Coumadin?   No     Depression / Suicide Risk    As you are discharged from this [facility] facility, it is important to learn how to keep safe from harming yourself.    Recognize the warning signs:  · Abrupt changes in personality, positive or negative- including increase in energy   · Giving away possessions  · Change in eating patterns- significant weight changes-  positive or negative  · Change in sleeping patterns- unable to sleep or sleeping all the time   · Unwillingness or inability to communicate  · Depression  · Unusual sadness, discouragement and loneliness  · Talk of wanting to die  · Neglect of personal appearance   · Rebelliousness- reckless behavior  · Withdrawal from people/activities they love  · Confusion- inability to concentrate     If you or a loved one observes any of these behaviors or has concerns about self-harm, here's what you can do:  · Talk about it- your feelings and reasons for harming yourself  · Remove any means that you might use to hurt yourself (examples: pills, rope, extension cords, firearm)  · Get professional help from the community  [facility]  part of [facility]ist Progress Note     Margaret Silverio Patient Status:  Inpatient    3/14/1945 MRN [id number]   Location [facility] POST ANESTHESIA CARE UNIT Attending Fernando Galan MD   Hosp Day # 11 PCP Johnathon Rodriguez DO     Subjective:     Pt was seen and examined.   More awake this morning.   No overnight events reported by the nursing staff.       Objective:    Review of Systems:   ROS completed; pertinent positive and negatives stated in subjective.      Vital signs:  Temp:  [97.1 °F (36.2 °C)-100.2 °F (37.9 °C)] 97.8 °F (36.6 °C)  Pulse:  [58-60] 60  Resp:  [16] 16  BP: (104-130)/(50-91) 130/91  SpO2:  [98 %-100 %] 100 %      Physical Exam:    Gen: alert, oriented x1  Chest: good air entry CTABL  CVS: normal s1 and s2 RR  Abd: NABS soft NT ND  Neuro: non-focal, following commands  Skin: decub dressing CDI  Ext: no edema in bilateral LE      Diagnostic Data:    Labs:  Recent Labs   Lab 24  1247 24  0626 24  0555 24  0643 24  0426   WBC 8.0 8.3 8.5 11.9* 15.1*   HGB 8.4* 8.1* 9.5* 8.4* 8.9*   MCV 98.3 95.2 97.8 93.8 97.7   .0* 712.0* 671.0* 658.0* 565.0*   BAND 2 1 2 4 5       Recent Labs   Lab 24  0555 24  0643 24  0426   GLU 93 380* 80   BUN 48* 46* 42*   CREATSERUM 1.43* 1.44* 1.58*   CA 9.3 8.8 9.1   ALB 3.4 3.1* 3.2   * 129* 133*   K 5.1 4.2 4.4    102 104   CO2 22.0 20.0* 21.0   ALKPHO  --  155* 154*   AST  --  17 15   ALT  --  <7* <7*   BILT  --  0.4 0.5   TP  --  6.3 6.6       Estimated Creatinine Clearance: 25.2 mL/min (A) (based on SCr of 1.58 mg/dL (H)).    No results for input(s): \"PTP\", \"INR\" in the last 168 hours.           Imaging: Imaging data reviewed in Epic.    Medications:    heparin  5,000 Units Subcutaneous Q8H BROOKLYNN    acetaminophen  650 mg Oral Q8H    patiromer  8.4 g Oral Once    sacubitril-valsartan  1 tablet Oral BID    bumetanide  1 mg Oral Daily     (Mental Health, Substance Abuse, psychological counseling)  · Do not be alone:Call your Safe Contact- someone whom you trust who will be there for you.  · Call your local CRISIS HOTLINE 399-7366 or 036-010-6791  · Call your local Children's Mobile Crisis Response Team [place] (300) 853-1362 or www.Appolicious  · Call the toll free National Suicide Prevention Hotlines   · National Suicide Prevention Lifeline 976-438-FUDC (2841)  · [employer] Line Network 800-SUICIDE (594-9948)        FOLLOW UP ITEMS POST DISCHARGE  Please call [phone] to schedule PCP appointment for patient.    Required specialty appointments include:       Discharge Instructions per Rehan Estes, A.P.R.N.  -Follow-up with primary care  -Follow-up with cardiology for better monitoring of blood pressure  -Start taking nicotine patch along with gum to help with smoking cessation  -Resume most of home medication, see changes in dosage for antihypertensive medication    DIET: Cardiac    ACTIVITY: As tolerated    DIAGNOSIS: Chest pain    Return to ER if symptoms persist, severe chest pain, palpitations, shortness of breath, numbness, tingling, weakness, and high fevers.       metoprolol succinate  25 mg Oral Daily Beta Blocker    midodrine  5 mg Oral TID    levETIRAcetam  500 mg Intravenous Q24H    sodium hypochlorite   Topical BID    amiodarone  200 mg Oral Daily    fentaNYL  1 patch Transdermal Q72H    ferrous sulfate  325 mg Oral Daily with breakfast    folic acid  800 mcg Oral Daily    gabapentin  300 mg Oral TID    [Held by provider] isosorbide dinitrate  20 mg Oral TID (Nitrates)    pantoprazole  40 mg Oral BID AC       Assessment & Plan:     AMS -> intermittent   Unclear etiology, consider metabolic encephalopathy  Neurology following, EEG WNL  Keppra resumed  CT head non-acute  ABG reviewed  More alert this a.m. and following commands  Hyponatremia  Na 129 -> 133  Continue D5NS  Monitor labs  Sacral decubitus ulcer  Gsx on consult  Now s/p Excision of cyst, sharp excisional debridement of buttock decubitus ulcer POD #10  PRN pain control  Completed 10 days of abx  Leukocytosis  WBC 8.5 ->11.9 -> 15  No fever curve, no obv source noted  Repeat cultures NGTD  UTI  UA reviewed  Ucx pending -> e. coli  Continue IV abx - completed course of treatment  NICM, HFrEF  Hx of paroxysmal afib  Hx of BRANDYN occlusion  AV paced  Continue home meds  GEORGE on CKD 3 - stable  Monitor renal function  Avoid nephrotoxic agents  Anemia  Hgb stable today  Cont to monitor   Thrombocytosis  Monitor labs  Heparin s/c for DVT ppx  Nutrition  Dietitian on consult  Encouraged to eat more  Family reports the patient would not want a feeding tube  GOC  Palliative care following. Patient and family okay with CPC  Appreciate CM/SW placement    Plan of care discussed with RN at bedside       Goals of care: Palliative care has been consulted, discussions on going regarding DC plan, daughter will discuss with family before making a decision on Hospice care. Otherwise will dc with home palliative care.     Supplementary Documentation:     Quality:  DVT Prophylaxis: Heparin s/c  CODE status: DNAR/Select      Estimated  date of discharge: TBD  Discharge is dependent on: clinical stability  At this point Ms. Silverio is expected to be discharge to: TBD    MDM: High

## 2024-08-23 NOTE — PHYSICAL THERAPY NOTE
PHYSICAL THERAPY EVALUATION - INPATIENT     Room Number: 432/432-A  Evaluation Date: 8/23/2024  Type of Evaluation: Initial   Physician Order: PT Eval and Treat    Presenting Problem: Pressure injury sacral region  Co-Morbidities : encephalopathy, CKD, Afib, seizures, cervical DJD  Reason for Therapy: Mobility Dysfunction and Discharge Planning    PHYSICAL THERAPY ASSESSMENT   Patient is a 79 year old female admitted 8/12/2024 for pressure injury to sacral region s/p I&D of wound.  Prior to admission, patient's baseline is assist with all mobility and ADL's lives with spouse uses a RW.  Patient is currently functioning below baseline with bed mobility, transfers, gait, maintaining seated position, standing prolonged periods, performing household tasks, and wheelchair mobility.  Patient is requiring maximum assist as a result of the following impairments: decreased functional strength, decreased endurance/aerobic capacity, pain, impaired sitting / standing balance, impaired coordination, impaired motor planning, decreased muscular endurance, and medical status.  Physical Therapy will continue to follow for duration of hospitalization.    Patient will benefit from continued skilled PT Services to promote return to prior level of function and safety with continuous assistance and gradual rehabilitative therapy .    PLAN  PT Treatment Plan: Bed mobility;Body mechanics;Endurance;Energy conservation;Patient education;Gait training;Range of motion;Strengthening;Transfer training;Balance training  Rehab Potential : Fair  Frequency (Obs): 3-5x/week    PHYSICAL THERAPY MEDICAL/SOCIAL HISTORY   History related to current admission: Goals of care: Palliative care has been consulted, discussions on going regarding DC plan, daughter will discuss with family before making a decision on Hospice care. Otherwise will dc with home palliative care.      Problem List  Principal Problem:    Pressure injury of skin of sacral region,  unspecified injury stage  Active Problems:    Pancytopenia (HCC)    Cardiomyopathy, unspecified (HCC)    Urinary tract infection without hematuria, site unspecified    Anemia, unspecified type    Encephalopathy    Goals of care, counseling/discussion    Palliative care encounter    Pain      HOME SITUATION  Home Situation  Type of Home: House  Home Layout: One level;Able to live on main level  Lives With: Spouse  Drives: No  Patient Owned Equipment: Rolling walker  Patient Regularly Uses: Glasses     Prior Level of [place]: Lives at home with spouse assist with all mobility and ADL's uses RW but has been losing mobility in last several weeks leading to a pressure injury.     SUBJECTIVE  I want to be able to walk again and get back home with my spouse.     PHYSICAL THERAPY EXAMINATION   OBJECTIVE  Precautions: Bed/chair alarm (sacral wound egg create cushion recommended for chair)  Fall Risk: High fall risk    WEIGHT BEARING RESTRICTION  Weight Bearing Restriction: None                PAIN ASSESSMENT  Ratin  Location: buttock sacral region with movement and sitting  Management Techniques: Activity promotion;Body mechanics;Breathing techniques;Relaxation;Repositioning    COGNITION  Overall Cognitive Status:  Impaired  A/O x 2 forgetful regarding last  ambulation or mobility in the last 2-3 weeks \"I do not remember\"  RANGE OF MOTION AND STRENGTH ASSESSMENT  Upper extremity ROM and strength are within functional limits B hands DJD with arthritis deformity and limited grasp both hand, 3-/5 with AAROM   Lower extremity ROM is within functional limits   Lower extremity strength is within functional limits 2+/5 BLE DJD sore knees with AAROM and PROM         ACTIVITY TOLERANCE  Pulse: 60  Heart Rate Source: Monitor  Resp: 16  BP: 114/54  BP Location: Right arm  BP Method: Automatic  Patient Position: Sitting    O2 WALK  Oxygen Therapy  SPO2% on Oxygen at Rest: 98  At rest oxygen flow (liters per minute): 1  SPO2%  Ambulation on Oxygen: 97  Ambulation oxygen flow (liters per minute): 1    AM-PAC '6-Clicks' INPATIENT SHORT FORM - BASIC MOBILITY  How much difficulty does the patient currently have...  Patient Difficulty: Turning over in bed (including adjusting bedclothes, sheets and blankets)?: A Lot   Patient Difficulty: Sitting down on and standing up from a chair with arms (e.g., wheelchair, bedside commode, etc.): A Lot   Patient Difficulty: Moving from lying on back to sitting on the side of the bed?: A Lot   How much help from another person does the patient currently need...   Help from Another: Moving to and from a bed to a chair (including a wheelchair)?: Total   Help from Another: Need to walk in hospital room?: Total   Help from Another: Climbing 3-5 steps with a railing?: Total     AM-PAC Score:  Raw Score: 9   Approx Degree of Impairment: 81.38%   Standardized Score (AM-PAC Scale): 30.55   CMS Modifier (G-Code): CM    FUNCTIONAL ABILITY STATUS  Functional Mobility/Gait Assessment  Gait Assistance: Not tested (sacral wound pain with sitting)  Rolling: maximum assist  Supine to Sit: maximum assist  Sit to Supine: maximum assist  Sit to Stand: maximum assist    Exercise/Education Provided:  Bed mobility  Body mechanics  Energy conservation  Functional activity tolerated  Gait training  Posture  ROM  Strengthening  Lower therapeutic exercise:  Ankle pumps  Hip AB/AD  Heel raises  SLR  Transfer training    Skilled Therapy Provided: Pt ed with AAROM / PROM for UE's LE's x 10 reps with soreness reported with all movement of LE's in sacral wound region. Pt ed with rolling and supine to sit to avoid shearing in buttock and sacral region with max A . Pt ed with sitting at EOB with max A for supine to sit and min A to SBA with sitting at EOB once placed in a midline position. Pt required side rail support with arm to maintain sitting and she fatigues quickly with sitting leaning forward while PT was taking vital set  presenting with increased kyphotic postures. PT was too weak to attempt standing at this time. Pt was assisted back to supine for comfort in semi sosa position to watch TV with eye glasses on per pt request. PT is motivated to return home with spouse assist and palliative or Hospice should be considered for quality of life with caregiver assist for all mobility and ADL's in the home. If comfort care is not the pt and family choice Gradual rehab should be considered with PT, OT to regain maximal PLOF. A palliative consult is pending.     The patient's Approx Degree of Impairment: 81.38% has been calculated based on documentation in the [facility] '6 clicks' Inpatient Basic Mobility Short Form.  Research supports that patients with this level of impairment may benefit from IP Rehab PT, OT as medical progress allows.  Final disposition will be made by interdisciplinary medical team.    Patient End of Session: In bed;With  staff;Needs met;Call light within reach;RN aware of session/findings;All patient questions and concerns addressed;Alarm set    CURRENT GOALS  Goals to be met by: 9/20/2024  Patient Goal Patient's self-stated goal is: Return home    Goal #1 Patient is able to demonstrate supine - sit EOB @ level: independent     Goal #1   Current Status Max A    Goal #2 Patient is able to demonstrate transfers Sit to/from Stand at assistance level: modified independent with walker - rolling     Goal #2  Current Status ongoing   Goal #3 Patient is able to ambulate 50 feet with assist device: walker - rolling at assistance level: supervision   Goal #3   Current Status Ongoing   Goal #4    Goal #4   Current Status    Goal #5 Patient to demonstrate independence with home activity/exercise instructions provided to patient in preparation for discharge.   Goal #5   Current Status Ongoing   Goal #6    Goal #6  Current Status      Patient Evaluation Complexity Level:  History Low - no personal factors and/or co-morbidities    Examination of body systems Low -  addressing 1-2 elements   Clinical Presentation Low- Stable   Clinical Decision Making  Low Complexity     Therapeutic Activity:  12 minutes

## 2024-08-23 NOTE — DIETARY NOTE
ADULT NUTRITION REASSESSMENT    Pt is at high nutrition risk.  Pt meets severe malnutrition criteria.      CRITERIA FOR MALNUTRITION DIAGNOSIS:  Criteria for severe malnutrition diagnosis: acute illness/injury related to wt loss greater than 7.5% in 3 months, energy intake less than 50% for greater than 5 days, body fat moderate depletion, and muscle mass moderate depletion.    RECOMMENDATIONS TO MD:  Pt appropriate for nutrition support (EN) if c/w pt's and family's wishes. RD liberalized diet to promote improved PO intake. Continue ONS as ordered. 1:1 feeds with encouragement.    See Nutrition Intervention for oral nutritional supplement (ONS) specifics     ADMITTING DIAGNOSIS:  Urinary tract infection without hematuria, site unspecified [N39.0]  Anemia, unspecified type [D64.9]  Pressure injury of skin of sacral region, unspecified injury stage [L89.159]  PERTINENT PAST MEDICAL HISTORY:   Past Medical History:    Acute kidney insufficiency    Anemia    Arrhythmia    Back problem    CHF (congestive heart failure) (HCC)    CKD (chronic kidney disease) stage 3, GFR 30-59 ml/min (HCC)    Deep vein thrombosis (HCC)    on B.T for only a month, possibly Magen    Essential hypertension    High blood pressure    History of blood transfusion    Rheumatoid arthritis (HCC)    Seizure disorder (HCC)    Pt denied at screening call 6/28/24     PATIENT STATUS: Initial 08/14/24: Pt admit for urinary tract infection without hematuria, anemia and pressure injury or skin of sacral region. PMH sig for HTN, CHF, CKD and others noted above. Pt assessed due to screening at risk for decreased appetite, unintentional weight loss and pressure injury (PI) - unstageable and maceration to right buttock and stage 2 & shear to left buttock. Pt known to nutrition services from previous admissions, last seen 6/19/24 and provided with HF diet education.  Chart reviewed, pt admitted with c/o sacral wound x 3 weeks and dark/loose stools. Pt with  recent admission (7/15-7/22) for acute on chronic congestive heart failure - treated with diuretics and dobutamine. General surgery consulted for debridement.  POD#1 s/p sacral debridement. Discussion with RN, poor appetite. Intakes reviewed, 25-40% x 2 meals since admit (poor). Pt visited, resting with eyes closed but answering majority of questions. Pt reports poor appetite/intake but unable to report timeframe. Pt reports not eating. Pt notes used to drink Ensure but stopped due to going right through causing pain/difficulties prior to surgery. Pt reports weight is not good, usual body weight (UBW) 142# but unable to determine last time weighed this. Current weight 120#. EMR weight review, last known weight 112# 2 oz on 8/1/24. Per EMR weight review, pt noted weighing 129# 10 oz on 7/1/24 - 9.6# or 7.4% weight loss x 1 month (significant), 129# 13 oz on 5/16/24 - 9.8# or 7.6% weight loss x 3 months (significant), 149# 14 oz on 2/3/24 - 29.9# or 19.9% weight loss x 6 months (significant) and 141# 14 oz on 8/21/23 - 21.9# or 15.4% weight loss x 1 year (non-significant). Nutrition focused physical exam (NFPE) completed, see below for details. Encouraged small frequent meals with emphasis on high calorie and high protein and ONS to help maximize nutrition. +ONS  per discussion.    8/19 UPDATE: pt slow to respond but did awake when I directed a question to her, but then fell back asleep. Daughter reports that her dad can get the pt to drink some of the ensure enlive. Otherwise pt eating very little. Pt with high K+ today--not diet related due to limited intake, but did adjust diet to liberalize cardiac diet and added low K+.      08/23/24 UPDATE: GOC discussions on-going. Per MD documentation, family report the patient would not want a feeding tube. Pt was more alert/awake this AM however was given Norco due to increased pain - now is sleepy/drowsy and inappropriate for interview. Refusing lunch. Only taking a few  sips of ONS daily. POD #10 s/p excision of cyst, sharp excisional debridement of buttock decubitus ulcer.     FOOD/NUTRITION RELATED HISTORY:  Appetite:  decreased/poor appetite PTA per pt. Not eating much  Intake:  Minimal intake; Consumed small amount of cereal this AM; noted no meal trays ordered for pt on 8/22  Intake Meeting Needs: No, but oral nutrition supplements (ONS) to maximize   Percent Meals Eaten (last 6 days)       Date/Time Percent Meals Eaten (%)    08/18/24 0900 --     Percent Meals Eaten (%): pudding at 08/18/24 0900    08/18/24 1419 100 %    08/20/24 1711 0 %    08/21/24 1100 --     Percent Meals Eaten (%): Simultaneous filing. User may not have seen previous data. at 08/21/24 1100    08/21/24 1146 5 %    08/22/24 1300 0 %    08/22/24 1550 0 %    08/22/24 1900 0 %    08/23/24 1009 30 %        Food Allergies: No Known Food Allergies (NKFA)  Cultural/Ethnic/Restorationist Preferences: Not Obtained    GASTROINTESTINAL: +BM last recorded on 8/18/2024 and abdomen soft, non-distended with active bowel sounds    MEDICATIONS: reviewed; Noted non-cardiac electrolyte replacement protocol ordered; IVF of D5W 0.9NS at 50 ml/hr provides 1.2L, 60 g dextrose and 204 kcal   dextrose 5%-sodium chloride 0.9% 50 mL/hr at 08/22/24 1128      heparin  5,000 Units Subcutaneous Q8H BROOKLYNN    acetaminophen  650 mg Oral Q8H    patiromer  8.4 g Oral Once    sacubitril-valsartan  1 tablet Oral BID    bumetanide  1 mg Oral Daily    metoprolol succinate  25 mg Oral Daily Beta Blocker    midodrine  5 mg Oral TID    levETIRAcetam  500 mg Intravenous Q24H    sodium hypochlorite   Topical BID    amiodarone  200 mg Oral Daily    fentaNYL  1 patch Transdermal Q72H    ferrous sulfate  325 mg Oral Daily with breakfast    folic acid  800 mcg Oral Daily    gabapentin  300 mg Oral TID    [Held by provider] isosorbide dinitrate  20 mg Oral TID (Nitrates)    pantoprazole  40 mg Oral BID AC     LABS: reviewed; A1c 5.9% on 11/27/22; renal labs  elevated; POC Glucose 70,103,85,108,76 (8/22-8/23)  Recent Labs     08/20/24  0626 08/21/24  0555 08/22/24  0643 08/23/24  0426   * 93 380* 80   BUN 59* 48* 46* 42*   CREATSERUM 1.56* 1.43* 1.44* 1.58*   CA 9.1 9.3 8.8 9.1   MG  --  2.1  --   --    * 132* 129* 133*   K 4.6 5.1 4.2 4.4    102 102 104   CO2 22.0 22.0 20.0* 21.0   PHOS 3.3 2.9  --   --    OSMOCALC 295 286 296* 285     NUTRITION RELATED PHYSICAL FINDINGS:  - Nutrition Focused Physical Exam (NFPE): moderate depletion body fat triceps region and moderate depletion muscle mass clavicle region unable to evaluate lower extremities at this time - largely bed-bound but will get up and move to a chair for part of the day most days  - Fluid Accumulation: none  see RN documentation for details  - Skin Integrity: Pressure Injury: Stage 3 on right buttock s/p debridement on 8/13 on left buttock and unstageable on right buttock and surgical wound(s) see RN documentation for details    ANTHROPOMETRICS:  HT:  162.6 cm (5' 4\")  WT: 54.4 kg (120 lb) - no current wt available  ADMISSION WT: 54.4 kg (120 lbs)  BMI: Body mass index is 20.6 kg/m².  BMI CLASSIFICATION: <22 considered underweight for advanced age  IBW: 120 lbs        100% IBW  Usual Body Wt: 142 lbs per pt but unable to determine timeframe      85% UBW    WEIGHT HISTORY: Per EMR weight review, pt noted weighing 149# February 2024 and 141# August 2023  Patient Weight(s) for the past 336 hrs:   Weight   08/12/24 1511 54.4 kg (120 lb)   08/12/24 0859 54.4 kg (120 lb)     Wt Readings from Last 10 Encounters:   08/12/24 54.4 kg (120 lb)   08/01/24 50.8 kg (112 lb 1.6 oz)   07/22/24 52.3 kg (115 lb 3.2 oz)   07/09/24 56.6 kg (124 lb 12.8 oz)   06/28/24 61.7 kg (136 lb)   07/01/24 58.8 kg (129 lb 9.6 oz)   06/29/24 60.8 kg (134 lb)   06/26/24 63.5 kg (140 lb)   06/21/24 63.5 kg (140 lb)   06/13/24 62.1 kg (137 lb)     NUTRITION DIAGNOSIS/PROBLEM:   Severe Malnutrition related to Acute -  Physiological causes resulting in anorexia or diminished intake as evidenced by wt loss greater than 7.5% in 3 months, energy intake less than 50% for greater than 5 days, body fat moderate depletion, and muscle mass moderate depletion.    NUTRITION DIAGNOSIS PROGRESS:  No Improvement (continue)--limited po intake. GOC discussions on-going.      NUTRITION INTERVENTION:   NUTRITION PRESCRIPTION:   Estimated Nutrition needs: --dosing wt of 54.4 kg - wt taken on 8/12/2024  Calories: 1107-6485 calories/day (28-32 calories per kg Dosing wt)  Protein: 64-80 g protein/day (1.2-1.5 g protein/kg Dosing wt)    - Diet:       Procedures    Regular/General diet Is Patient on Accuchecks? No    **sodium and potassium restrictions liberalized to promote improved PO intake**    - RD Malnutrition Care Plan: Adjusted diet to least restrictive to maximize intake, Encouraged increased PO intake, Encouraged small frequent meals with emphasis on high calorie/high protein, and Initiated ONS (oral nutritional supplements)  - Meals and snacks: Encouraged small frequent meals and Encouraged increased PO intake  - Medical Food Supplements: RD added Ensure Enlive (350 calories/ 20 g protein each) BID Strawberry. Rational/use of oral supplements discussed.  - Vitamin and mineral supplements: folic acid and iron / MD  - Feeding assistance: meal set up and feed  - Nutrition education: Discussed importance of adequate energy and protein intake   - Coordination of nutrition care: collaboration with other providers - discussed with RN on unit  - Discharge and transfer of nutrition care to new setting or provider: monitor plans - GOC discussions on-going    MONITOR AND EVALUATE/NUTRITION GOALS:  - Food and Nutrient Intake:      Monitor: adequacy of PO intake and adequacy of supplement intake  - Food and Nutrient Administration:      Monitor: need for temporary enteral nutrition and goc/family wishes regarding nutrition  - Anthropometric Measurement:     Monitor weight  - Nutrition Goals:      halt wt loss, PO and supplement greater than 75% of needs, good supplement intake, labs within acceptable limits, euglycemia, support wound healing, monitor fluid status, and improved GI status    DIETITIAN FOLLOW UP: RD to follow and monitor nutrition status    Ann Estrada MS, RD, LDN, CNSC  [id number]

## 2024-08-23 NOTE — PROGRESS NOTES
Progress Note  Margaret Jonny Silverio Patient Status:  Inpatient    3/14/1945 MRN [id number]   Location [facility] 4W/SW/SE Attending Veto Zhang MD   Hosp Day # 11 PCP Johnathon Rodriguez,      Subjective:  Minimally responsive, recent norco for pain     Objective:  BP (!) 130/91 (BP Location: Right arm)   Pulse 60   Temp 97.8 °F (36.6 °C) (Temporal)   Resp 16   Wt 120 lb (54.4 kg)   SpO2 100%   BMI 20.60 kg/m²     Telemetry: not on tele    Intake/Output:    Intake/Output Summary (Last 24 hours) at 2024 1532  Last data filed at 2024 0515  Gross per 24 hour   Intake 650 ml   Output 100 ml   Net 550 ml     Last 3 Weights   24 1511 120 lb (54.4 kg)   24 0859 120 lb (54.4 kg)   24 1100 112 lb 1.6 oz (50.8 kg)   24 0355 115 lb 3.2 oz (52.3 kg)   24 0620 109 lb 6.4 oz (49.6 kg)   24 0450 112 lb 11.2 oz (51.1 kg)   24 0458 113 lb 9.6 oz (51.5 kg)   24 0508 117 lb 14.4 oz (53.5 kg)   24 0614 123 lb 12.8 oz (56.2 kg)   07/15/24 1830 124 lb 1.6 oz (56.3 kg)     Labs:  Recent Labs   Lab 24  0555 24  0643 24  0426   GLU 93 380* 80   BUN 48* 46* 42*   CREATSERUM 1.43* 1.44* 1.58*   EGFRCR 38* 37* 33*   CA 9.3 8.8 9.1   * 129* 133*   K 5.1 4.2 4.4    102 104   CO2 22.0 20.0* 21.0     Recent Labs   Lab 24  0555 24  0643 24  0426   RBC 3.18* 2.76* 3.02*   HGB 9.5* 8.4* 8.9*   HCT 31.1* 25.9* 29.5*   MCV 97.8 93.8 97.7   MCH 29.9 30.4 29.5   MCHC 30.5* 32.4 30.2*   RDW 19.8* 19.5* 19.5*   NEPRELIM 4.44 7.99* 8.08*   WBC 8.5 11.9* 15.1*   .0* 658.0* 565.0*     No results for input(s): \"TROP\", \"TROPHS\", \"CK\" in the last 168 hours.  Lab Results   Component Value Date/Time    HDL 54 02/10/2024 12:38 PM    LDL 63 02/10/2024 12:38 PM    TRIG 91 02/10/2024 12:38 PM     No results found for: \"DDIMER\"  Lab Results   Component Value Date    TSH 7.534 (H) 2024     ROS: Unable to perform, pt not  responsding    Physical Exam:  General: Alert, cooperative, no distress, appears stated age.  Neck: Supple, symmetrical, trachea midline, no adenopathy, thyroid: no enlargment/tenderness/nodules, no carotid bruit and no JVD.  Lungs: Clear to auscultation bilaterally.  Chest wall: No tenderness or deformity.  Heart: Regular rate and rhythm, S1, S2 normal, no murmur, click, rub or gallop.  Abdomen: Soft, non-tender. Bowel sounds normal. No masses,  No organomegaly.  Extremities: Extremities normal, atraumatic, no cyanosis or edema.  Pulses: 2+ and symmetric all extremities.  Neurologic: Grossly intact.    Medications:   heparin  5,000 Units Subcutaneous Q8H BROOKLYNN    acetaminophen  650 mg Oral Q8H    patiromer  8.4 g Oral Once    sacubitril-valsartan  1 tablet Oral BID    bumetanide  1 mg Oral Daily    metoprolol succinate  25 mg Oral Daily Beta Blocker    midodrine  5 mg Oral TID    levETIRAcetam  500 mg Intravenous Q24H    sodium hypochlorite   Topical BID    amiodarone  200 mg Oral Daily    fentaNYL  1 patch Transdermal Q72H    ferrous sulfate  325 mg Oral Daily with breakfast    folic acid  800 mcg Oral Daily    gabapentin  300 mg Oral TID    [Held by provider] isosorbide dinitrate  20 mg Oral TID (Nitrates)    pantoprazole  40 mg Oral BID AC      dextrose 5%-sodium chloride 0.9% 50 mL/hr at 08/22/24 1128     Assessment:    Sacral decubitus ulcer   s/p excision of cyst, excisional debridement 8/13  -Mgmt per surgery    Chronic HFrEF/NICM/Severe Wide-Open TR  Echo 6/2024 - LVEF 30-35%, dyskinesis of anteroseptal, anterior walls, biatrial dilation, mild AVS, wide open TR, PASP 48mmHg  -[facility] 2022 w/non obs disease  -GDMT - multiple doses have been held d/t lethargy, low BP during admit  -Coreg now changed to toprol d/t lower BP  -Entresto decreased dosing to 24/26 po BID  -Spironolactone 25mg po daily - on hold d/t GFR and hypotension  -Farxiga 10mg po daily - on hold  -Isordil 20mg po TID - on hold  -Bumex 1mg po daily    Appears compensated on exam    Hypotension   Improved with midodrine 5mg po TID, holding isordil as BP is very labile    Altered Mental Status  Toxic Metabolic vs Infectious  -EEG w/o seizure activity - on Keppra  -Neuro following    GEORGE on CKD  -BUN/Cr downtrending, stable 1.4-1.5    Acute on Chronic Anemia  -Hgb 8.9  -Rec'd PRBC on 8/16      UTI  Urine culture +E Coli   -On IV antibx    Paroxysmal Atrial Fibrillation  -[place] Phillip Dual Chamber PPM/ICD - 9/2023  -On amiodarone 200mg po daily  -Formerly on Eliquis - no longer on AC d/t Hx severe GIB    Hx Bioprosthetic Mitral Valve Replacement    Severe RA   methotrexate at home    Hx Recurrent GIB, AVM   s/p clip 4/2023    Leukocytosis  WBC increasing, afebrile  -ID consulted, blood cultures drawn  -recent positive Urine cx      Plan:  -Continue midodrine, entresto, toprol if safe to give PO meds with altered mental status  -continue to hold isordil with labile BP  -Continue amiodarone as tolerated  -Continue bumex 1mg PO daily as tolerated  -Abx per ID  -further recs per primary  -Agree with palliative recs  -Cardiology will sign off at this time, please notify us with additional questions or concerns      Plan of care discussed with patient, RN.        Delisa Bey, APRN  8/23/2024  3:32 PM  [phone] [place]  [phone] [place]

## 2024-08-23 NOTE — CONSULTS
[facility]  part of [facility] ID CONSULT NOTE    Margaret Silverio Patient Status:  Inpatient    3/14/1945 MRN [id number]   Location [facility] 4W/SW/SE Attending Veto Zhang MD   Hosp Day # 11 PCP Johnathon Rodriguez DO       Reason for Consultation:  Bacteremia    ASSESSMENT:    Antibiotics: None;  (IV ceftriaxone -)    # Staph not aureus bacteremia in 1/2 sets on 24 - suspect contaminant - repeat pending; r/o IE   - has bioprosthetic MV and L chest PPM    # Leukocytosis - suspect reactive; r/o bacteremia; repeat cx pending    # Sacral wound stage 2 s/p OR debridement 24 - cx E.coli, anaerobes - currently no signs of infection    # Asymptomatic bacteruria - E. coli  # Intermittent AMS  # GEORGE on CKD - improved  # Anemia s/p transfusion   # RA, bedbound, previously on MTX and prednisone   - received prednisone at last discharge; currently off related to limited PO      PLAN:    -  monitor off abx  -  repeat BCx  -  f/up on BCx  -  wound care  -  GOC discussion  -  will discuss with Primary regarding steroids  -  Follow fever curve, wbc  -  Reviewed labs, micro, imaging reports, available old records  -  d/w patient, daughter, Primary, RNA    History of Present Illness:  Margaret Silverio is a a(n) 79 year old female. Is a 79-year-old female with a history of severe nonischemic cardiomyopathy with multiple admissions for heart failure, A-fib, CKD, severe RA with multiple joints involved was recently discharged about 1 month prior.  During that admission had a large left pleural effusion on dobutamine, Bumex, thoracentesis, left and right heart cath showing normal coronaries but severe volume overload.  Also had GEORGE and leukopenia.  Now admitted on  with painful decubitus ulcer.  Patient is bedbound with severe RA on methotrexate and prednisone.  Afebrile, 2 L nasal cannula.  Seen by general surgery and taken the OR on  status post  excision of cyst and sharp debridement of the decubitus ulcer.  Cultures with E. coli and anaerobes.  IV ceftriaxone for sacral wound and UTI completed 8/21.  Now with fever of 100.2 on 8/22 with increased WBC.  Blood culture sent with staph not aureus in 1 out of 2 sets.  Chest x-ray with no new focal opacity with persistent edema noted.  Hooks care discussion ongoing related to poor appetite, pain control.    History:  Past Medical History:    Acute kidney insufficiency    Anemia    Arrhythmia    Back problem    CHF (congestive heart failure) (HCC)    CKD (chronic kidney disease) stage 3, GFR 30-59 ml/min (HCC)    Deep vein thrombosis (HCC)    on B.T for only a month, possibly Magen    Essential hypertension    High blood pressure    History of blood transfusion    Rheumatoid arthritis (HCC)    Seizure disorder (HCC)    Pt denied at screening call 6/28/24     Past Surgical History:   Procedure Laterality Date    Cabg      Cardiac pacemaker placement      Colonoscopy N/A 01/17/2024    Dr. Rios    Colonoscopy N/A 01/17/2024    Procedure: COLONOSCOPY;  Surgeon: Zoraida Rios MD;  Location: [facility] ENDOSCOPY    Egd N/A 01/17/2024    Dr. Rios    Fracture surgery      Other surgical history  2017    Heart Surgery     Other surgical history  2020    Bilateral shoulder replacement      No family history on file.   reports that she quit smoking about 10 years ago. Her smoking use included cigarettes. She has never used smokeless tobacco. She reports that she does not drink alcohol and does not use drugs.    Allergies:  Allergies   Allergen Reactions    Lisinopril Coughing       Medications:    Current Facility-Administered Medications:     HYDROcodone-acetaminophen (Norco)  MG per tab 1 tablet, 1 tablet, Oral, Q4H PRN    dextrose 5%-sodium chloride 0.9% infusion, , Intravenous, Continuous    heparin (Porcine) 5000 UNIT/ML injection 5,000 Units, 5,000 Units, Subcutaneous, Q8H BROOKLYNN    HYDROmorphone (Dilaudid) 1 MG/ML injection  0.2 mg, 0.2 mg, Intravenous, Q4H PRN **OR** [DISCONTINUED] HYDROmorphone (Dilaudid) 1 MG/ML injection 0.4 mg, 0.4 mg, Intravenous, Q3H PRN **OR** [DISCONTINUED] HYDROmorphone (Dilaudid) 1 MG/ML injection 0.8 mg, 0.8 mg, Intravenous, Q3H PRN    acetaminophen (Tylenol) 160 MG/5ML oral liquid 650 mg, 650 mg, Oral, Q8H    senna-docusate (Senokot-S) 8.6-50 MG per tab 2 tablet, 2 tablet, Oral, Daily PRN    polyethylene glycol (PEG 3350) (Miralax) 17 g oral packet 17 g, 17 g, Oral, Daily PRN    patiromer (Veltassa) 8.4 g oral packet 8.4 g, 8.4 g, Oral, Once    glucose (Dex4) 15 GM/59ML oral liquid 15 g, 15 g, Oral, Q15 Min PRN **OR** glucose (Glutose) 40% oral gel 15 g, 15 g, Oral, Q15 Min PRN **OR** glucose-vitamin C (Dex-4) chewable tab 4 tablet, 4 tablet, Oral, Q15 Min PRN **OR** dextrose 50% injection 50 mL, 50 mL, Intravenous, Q15 Min PRN **OR** glucose (Dex4) 15 GM/59ML oral liquid 30 g, 30 g, Oral, Q15 Min PRN **OR** glucose (Glutose) 40% oral gel 30 g, 30 g, Oral, Q15 Min PRN **OR** glucose-vitamin C (Dex-4) chewable tab 8 tablet, 8 tablet, Oral, Q15 Min PRN    traMADol (Ultram) tab 50 mg, 50 mg, Oral, Q12H PRN    sacubitril-valsartan (Entresto) 24-26 MG per tab 1 tablet, 1 tablet, Oral, BID    bumetanide (Bumex) tab 1 mg, 1 mg, Oral, Daily    metoprolol succinate ER (Toprol XL) 24 hr tab 25 mg, 25 mg, Oral, Daily Beta Blocker    midodrine (ProAmatine) tab 5 mg, 5 mg, Oral, TID    levETIRAcetam (Keppra) 500 mg/5mL injection 500 mg, 500 mg, Intravenous, Q24H    ondansetron (Zofran) 4 MG/2ML injection 4 mg, 4 mg, Intravenous, Q6H PRN    metoclopramide (Reglan) 5 mg/mL injection 5 mg, 5 mg, Intravenous, Q8H PRN    sodium hypochlorite (Dakin's) 0.125 % external solution, , Topical, BID    acetaminophen (Tylenol Extra Strength) tab 500 mg, 500 mg, Oral, Q4H PRN    amiodarone (Pacerone) tab 200 mg, 200 mg, Oral, Daily    fentaNYL (Duragesic) 12 MCG/HR patch 1 patch, 1 patch, Transdermal, Q72H    ferrous sulfate DR tab  325 mg, 325 mg, Oral, Daily with breakfast    folic acid (Folvite) tab 800 mcg, 800 mcg, Oral, Daily    gabapentin (Neurontin) cap 300 mg, 300 mg, Oral, TID    [Held by provider] isosorbide dinitrate (Isordil) tab 20 mg, 20 mg, Oral, TID (Nitrates)    pantoprazole (Protonix) DR tab 40 mg, 40 mg, Oral, BID AC    Review of Systems:  Limited 2/2 clinical status    Physical Exam:  Vital signs: Blood pressure (!) 130/91, pulse 60, temperature 97.8 °F (36.6 °C), temperature source Temporal, resp. rate 16, weight 120 lb (54.4 kg), SpO2 100%.    General: in bed, fatigued, nad, NC  HEENT: Moist mucous membranes. EOMI  Neck: No lymphadenopathy.  Supple.  Cardiovascular: No chest wall tenderness  Respiratory: Symmetric expansion  Abdomen: Soft, nontender, nondistended.   Musculoskeletal: bilateral knee effusion  Integument: No lesions. No erythema.  Wound image reviewed    Laboratory Data: Reviewed in EMR    Microbiology: Reviewed in EMR    Radiology: Reviewed    Thank you for allowing us to participate in the care of this patient. Please do not hesitate to call if you have any questions.     We will continue to follow with you and will make further recommendations based on his progress.    MD [facility]  [phone]  8/23/2024

## 2024-08-23 NOTE — PAYOR COMM NOTE
PLEASE GIVE RECONSIDERATION/APPROVAL TO THIS INPT ADMISSION      CONTINUED STAY REVIEW    Payor: [employer]  Subscriber #:  [id number]  Authorization Number: [id number]    Admit date: 8/12/24  Admit time:  2:41 PM    REVIEW DOCUMENTATION:  8/23/2024:  ID CONSULT NOTE   Hosp Day # 11   Reason for Consultation:  Bacteremia     79 year old female. Is a 79-year-old female with a history of severe nonischemic cardiomyopathy with multiple admissions for heart failure, A-fib, CKD, severe RA with multiple joints involved was recently discharged about 1 month prior.  During that admission had a large left pleural effusion on dobutamine, Bumex, thoracentesis, left and right heart cath showing normal coronaries but severe volume overload.  Also had GEORGE and leukopenia.  Now admitted on 8/12 with painful decubitus ulcer.  Patient is bedbound with severe RA on methotrexate and prednisone.  Afebrile, 2 L nasal cannula.  Seen by general surgery and taken the OR on 8/13 status post excision of cyst and sharp debridement of the decubitus ulcer.  Cultures with E. coli and anaerobes.  IV ceftriaxone for sacral wound and UTI completed 8/21.  Now with fever of 100.2 on 8/22 with increased WBC.  Blood culture sent with staph not aureus in 1 out of 2 sets.  Chest x-ray with no new focal opacity with persistent edema noted.  Hooks care discussion ongoing related to poor appetite, pain control.     Blood pressure (!) 130/91, pulse 60, temperature 97.8 °F (36.6 °C), temperature source Temporal, resp. rate 16, weight 120 lb (54.4 kg), SpO2 100%     ASSESSMENT:   Antibiotics: None;  (IV ceftriaxone 8/12-8/21)     # Staph not aureus bacteremia in 1/2 sets on 8/22/24 - suspect contaminant - repeat pending     # Leukocytosis - suspect reactive; r/o bacteremia; repeat cx pending     # Sacral wound stage 2 s/p OR debridement 8/13/24 - cx E.coli, anaerobes - currently no signs of infection     # Asymptomatic bacteruria - E. coli  #  Intermittent AMS  # GEORGE on CKD - improved  # Anemia s/p transfusion 8/17  # RA, bedbound, previously on MTX and prednisone     PLAN:  -  monitor off abx  -  repeat BCx  -  f/up on BCx  -  wound care  -  GOC discussion  -  Follow fever curve, wbc  -  Reviewed labs, micro, imaging reports, available old records  -  d/w patient, daughter, Primary, RNA    We will continue to follow with you and will make further recommendations based on his progress.     Geoffrey Villareal MD   8/23/2024    MEDICATIONS ADMINISTERED IN LAST 1 DAY:  amiodarone (Pacerone) tab 200 mg       Date Action Dose Route User    8/23/2024 1040 Given 200 mg Oral Keyana Zhang RN          bumetanide (Bumex) tab 1 mg       Date Action Dose Route User    8/23/2024 1040 Given 1 mg Oral Keyana Zhang RN          sodium hypochlorite (Dakin's) 0.125 % external solution       Date Action Dose Route User    8/23/2024 1047 Given (none) Topical Keyana Zhang RN          folic acid (Folvite) tab 800 mcg       Date Action Dose Route User    8/23/2024 1040 Given 800 mcg Oral Keyana Zhang RN          gabapentin (Neurontin) cap 300 mg       Date Action Dose Route User    8/23/2024 1040 Given 300 mg Oral Keyana Zhang RN          heparin (Porcine) 5000 UNIT/ML injection 5,000 Units       Date Action Dose Route User    8/23/2024 0539 Given 5,000 Units Subcutaneous (Left Lower Abdomen) Black Khan RN          HYDROcodone-acetaminophen (Norco)  MG per tab 1 tablet       Date Action Dose Route User    8/23/2024 0539 Given 1 tablet Oral Black Khan RN          HYDROcodone-acetaminophen (Norco)  MG per tab 1 tablet       Date Action Dose Route User    8/23/2024 1040 Given 1 tablet Oral Keyana Zhang RN          midodrine (ProAmatine) tab 5 mg       Date Action Dose Route User    8/23/2024 1040 Given 5 mg Oral Keyana Zhang RN    8/23/2024 0539 Given 5 mg Oral Black Khan RN          pantoprazole (Protonix) DR tab 40 mg        Date Action Dose Route User    8/23/2024 0539 Given 40 mg Oral Black Khan, RN          sacubitril-valsartan (Entresto) 24-26 MG per tab 1 tablet       Date Action Dose Route User    8/23/2024 1047 Given 1 tablet Oral Keyana Zhang, RN     Vitals (last day)       Date/Time Temp Pulse Resp BP SpO2 Weight O2 Device O2 Flow Rate (L/min) [facility]    08/23/24 1641 98.9 °F (37.2 °C) 65 14 130/66 100 % -- Nasal cannula 1 L/min TS    08/23/24 1042 -- 60 16 130/91 -- -- -- -- BB    08/23/24 0830 -- 60 16 114/54 -- -- -- -- DF    08/23/24 0740 97.8 °F (36.6 °C) 59 16 114/54 100 % -- Nasal cannula 1 L/min TS    08/23/24 0515 97.7 °F (36.5 °C) 60 16 120/56 100 % -- Nasal cannula 0.5 L/min RP    08/22/24 2053 100.2 °F (37.9 °C) 60 16 104/50 100 % -- Nasal cannula 0.5 L/min DM    08/22/24 1550 97.1 °F (36.2 °C) 58 16 122/62 98 % -- Nasal cannula 0.5 L/min NT    08/22/24 0832 -- 60 16 124/56 99 % -- Nasal cannula 0.5 L/min NT    08/22/24 0618 96.9 °F (36.1 °C) 60 16 114/55 99 % -- Nasal cannula 0.5 L/min AS          CIWA Scores (since admission)       None          Blood Transfusion Record       Product Unit Status Volume Start End            Transfuse RBC       24  [id number]  [id number] Stopped 307.5 mL 08/16/24 1100 08/16/24 1406             PLEASE GIVE RECONSIDERATION/APPROVAL TO THIS INPT ADMISSION

## 2024-08-24 ENCOUNTER — APPOINTMENT (OUTPATIENT)
Dept: CV DIAGNOSTICS | Facility: HOSPITAL | Age: 79
DRG: 853 | End: 2024-08-24
Attending: PHYSICIAN ASSISTANT
Payer: MEDICARE

## 2024-08-24 LAB
ALBUMIN SERPL-MCNC: 3.2 G/DL (ref 3.2–4.8)
ALBUMIN/GLOB SERPL: 1 {RATIO} (ref 1–2)
ALP LIVER SERPL-CCNC: 160 U/L
ALT SERPL-CCNC: <7 U/L
ANION GAP SERPL CALC-SCNC: 9 MMOL/L (ref 0–18)
AST SERPL-CCNC: 14 U/L (ref ?–34)
BASOPHILS # BLD: 0 X10(3) UL (ref 0–0.2)
BASOPHILS NFR BLD: 0 %
BILIRUB SERPL-MCNC: 0.5 MG/DL (ref 0.2–1.1)
BLASTS # BLD: 0 X10(3) UL
BLASTS NFR BLD: 0 %
BUN BLD-MCNC: 50 MG/DL (ref 9–23)
BUN/CREAT SERPL: 24.9 (ref 10–20)
CALCIUM BLD-MCNC: 9.1 MG/DL (ref 8.7–10.4)
CHLORIDE SERPL-SCNC: 105 MMOL/L (ref 98–112)
CO2 SERPL-SCNC: 21 MMOL/L (ref 21–32)
CREAT BLD-MCNC: 2.01 MG/DL
DEPRECATED RDW RBC AUTO: 67.7 FL (ref 35.1–46.3)
EGFRCR SERPLBLD CKD-EPI 2021: 25 ML/MIN/1.73M2 (ref 60–?)
EOSINOPHIL # BLD: 0 X10(3) UL (ref 0–0.7)
EOSINOPHIL NFR BLD: 0 %
ERYTHROCYTE [DISTWIDTH] IN BLOOD BY AUTOMATED COUNT: 19.5 % (ref 11–15)
GLOBULIN PLAS-MCNC: 3.3 G/DL (ref 2–3.5)
GLUCOSE BLD-MCNC: 87 MG/DL (ref 70–99)
GLUCOSE BLDC GLUCOMTR-MCNC: 136 MG/DL (ref 70–99)
GLUCOSE BLDC GLUCOMTR-MCNC: 144 MG/DL (ref 70–99)
GLUCOSE BLDC GLUCOMTR-MCNC: 79 MG/DL (ref 70–99)
GLUCOSE BLDC GLUCOMTR-MCNC: 80 MG/DL (ref 70–99)
HCT VFR BLD AUTO: 27.1 %
HGB BLD-MCNC: 8.2 G/DL
LACTATE SERPL-SCNC: 0.8 MMOL/L (ref 0.5–2)
LYMPHOCYTES NFR BLD: 1.28 X10(3) UL (ref 1–4)
LYMPHOCYTES NFR BLD: 5 %
MCH RBC QN AUTO: 29.5 PG (ref 26–34)
MCHC RBC AUTO-ENTMCNC: 30.3 G/DL (ref 31–37)
MCV RBC AUTO: 97.5 FL
METAMYELOCYTES # BLD: 0.77 X10(3) UL
METAMYELOCYTES NFR BLD: 3 %
MONOCYTES # BLD: 4.34 X10(3) UL (ref 0.1–1)
MONOCYTES NFR BLD: 17 %
MYELOCYTES # BLD: 0.51 X10(3) UL
MYELOCYTES NFR BLD: 2 %
NEUTROPHILS # BLD AUTO: 18.13 X10 (3) UL (ref 1.5–7.7)
NEUTROPHILS NFR BLD: 71 %
NEUTS BAND NFR BLD: 2 %
NEUTS HYPERSEG # BLD: 18.62 X10(3) UL (ref 1.5–7.7)
OSMOLALITY SERPL CALC.SUM OF ELEC: 293 MOSM/KG (ref 275–295)
PLATELET # BLD AUTO: 543 10(3)UL (ref 150–450)
POTASSIUM SERPL-SCNC: 4.7 MMOL/L (ref 3.5–5.1)
PROT SERPL-MCNC: 6.5 G/DL (ref 5.7–8.2)
RBC # BLD AUTO: 2.78 X10(6)UL
SODIUM SERPL-SCNC: 135 MMOL/L (ref 136–145)
TOTAL CELLS COUNTED BLD: 100
WBC # BLD AUTO: 25.5 X10(3) UL (ref 4–11)

## 2024-08-24 PROCEDURE — 93308 TTE F-UP OR LMTD: CPT | Performed by: PHYSICIAN ASSISTANT

## 2024-08-24 PROCEDURE — 99233 SBSQ HOSP IP/OBS HIGH 50: CPT | Performed by: INTERNAL MEDICINE

## 2024-08-24 PROCEDURE — 99223 1ST HOSP IP/OBS HIGH 75: CPT | Performed by: INTERNAL MEDICINE

## 2024-08-24 RX ORDER — METHYLPREDNISOLONE SODIUM SUCCINATE 40 MG/ML
40 INJECTION, POWDER, LYOPHILIZED, FOR SOLUTION INTRAMUSCULAR; INTRAVENOUS EVERY 12 HOURS
Status: DISCONTINUED | OUTPATIENT
Start: 2024-08-24 | End: 2024-08-24

## 2024-08-24 RX ORDER — VANCOMYCIN HYDROCHLORIDE 125 MG/1
250 CAPSULE ORAL 4 TIMES DAILY
Status: DISCONTINUED | OUTPATIENT
Start: 2024-08-24 | End: 2024-08-26

## 2024-08-24 RX ORDER — PREDNISONE 20 MG/1
20 TABLET ORAL
Status: DISCONTINUED | OUTPATIENT
Start: 2024-08-24 | End: 2024-08-24

## 2024-08-24 NOTE — PLAN OF CARE
Pt transferred from [facility]  accompanied by JEFFERY Espinosa. During Bedside shift report it was found the pt's Fentanyl patch is not applied.

## 2024-08-24 NOTE — PROGRESS NOTES
Report given to Perry GIL pre-transfer    0011: Called and updated daughter Barbi on plan of care    0056: Patient transferred to CCU room 221.

## 2024-08-24 NOTE — PLAN OF CARE
Patient AOX4 am.  Became drowsy throughout the day. 1L nasal cannula. General diet, tolerated well. SCDs for dvt prophylaxis. Refused heparin. Blue boots. Q2 turns. BM today. Voiding via purewick. Pt frequently repositioned by staff. IV fluids running. Pt refused pm meds. Pt daughter wishes to keep pt comfortable and pain free as a priority. MD aware.     Problem: Patient Centered Care  Goal: Patient preferences are identified and integrated in the patient's plan of care  Description: Interventions:  - What would you like us to know as we care for you?   - Provide timely, complete, and accurate information to patient/family  - Incorporate patient and family knowledge, values, beliefs, and cultural backgrounds into the planning and delivery of care  - Encourage patient/family to participate in care and decision-making at the level they choose  - Honor patient and family perspectives and choices  Outcome: Progressing     Problem: Patient/Family Goals  Goal: Patient/Family Long Term Goal  Description: Patient's Long Term Goal:    Interventions:  - See additional Care Plan goals for specific interventions  Outcome: Progressing  Goal: Patient/Family Short Term Goal  Description: Patient's Short Term Goal:    Interventions:   - See additional Care Plan goals for specific interventions  Outcome: Progressing     Problem: PAIN - ADULT  Goal: Verbalizes/displays adequate comfort level or patient's stated pain goal  Description: INTERVENTIONS:  - Encourage pt to monitor pain and request assistance  - Assess pain using appropriate pain scale  - Administer analgesics based on type and severity of pain and evaluate response  - Implement non-pharmacological measures as appropriate and evaluate response  - Consider cultural and social influences on pain and pain management  - Manage/alleviate anxiety  - Utilize distraction and/or relaxation techniques  - Monitor for opioid side effects  - Notify MD/LIP if interventions unsuccessful  or patient reports new pain  - Anticipate increased pain with activity and pre-medicate as appropriate  Outcome: Progressing     Problem: RISK FOR INFECTION - ADULT  Goal: Absence of fever/infection during anticipated neutropenic period  Description: INTERVENTIONS  - Monitor WBC  - Administer growth factors as ordered  - Implement neutropenic guidelines  Outcome: Progressing     Problem: SAFETY ADULT - FALL  Goal: Free from fall injury  Description: INTERVENTIONS:  - Assess pt frequently for physical needs  - Identify cognitive and physical deficits and behaviors that affect risk of falls.  - [place] fall precautions as indicated by assessment.  - Educate pt/family on patient safety including physical limitations  - Instruct pt to call for assistance with activity based on assessment  - Modify environment to reduce risk of injury  - Provide assistive devices as appropriate  - Consider OT/PT consult to assist with strengthening/mobility  - Encourage toileting schedule  Outcome: Progressing     Problem: SKIN/TISSUE INTEGRITY - ADULT  Goal: Skin integrity remains intact  Description: INTERVENTIONS  - Assess and document risk factors for pressure ulcer development  - Assess and document skin integrity  - Monitor for areas of redness and/or skin breakdown  - Initiate interventions, skin care algorithm/standards of care as needed  Outcome: Progressing  Goal: Incision(s), wounds(s) or drain site(s) healing without S/S of infection  Description: INTERVENTIONS:  - Assess and document risk factors for pressure ulcer development  - Assess and document skin integrity  - Assess and document dressing/incision, wound bed, drain sites and surrounding tissue  - Implement wound care per orders  - Initiate isolation precautions as appropriate  - Initiate Pressure Ulcer prevention bundle as indicated  Outcome: Progressing  Goal: Oral mucous membranes remain intact  Description: INTERVENTIONS  - Assess oral mucosa and hygiene  practices  - Implement preventative oral hygiene regimen  - Implement oral medicated treatments as ordered  Outcome: Progressing     Problem: Delirium  Goal: Minimize duration of delirium  Description: Interventions:  - Encourage use of hearing aids, eye glasses  - Promote highest level of mobility daily  - Provide frequent reorientation  - Promote wakefulness i.e. lights on, blinds open  - Promote sleep, encourage patient's normal rest cycle i.e. lights off, TV off, minimize noise and interruptions  - Encourage family to assist in orientation and promotion of home routines  Outcome: Progressing

## 2024-08-24 NOTE — PLAN OF CARE
Patient is Aox4 on 1L NC. Rolling side to side. General diet. SCDs, SubQ Heparin, and blueboots in place - heels elevated off bed. Q2H turns. LBM 8/23. Patient refused nighttime meds. High temp -MD notified- IV tylenol given per MD Zhang.       Problem: Patient Centered Care  Goal: Patient preferences are identified and integrated in the patient's plan of care  Description: Interventions:  - What would you like us to know as we care for you?   - Provide timely, complete, and accurate information to patient/family  - Incorporate patient and family knowledge, values, beliefs, and cultural backgrounds into the planning and delivery of care  - Encourage patient/family to participate in care and decision-making at the level they choose  - Honor patient and family perspectives and choices  Outcome: Progressing     Problem: PAIN - ADULT  Goal: Verbalizes/displays adequate comfort level or patient's stated pain goal  Description: INTERVENTIONS:  - Encourage pt to monitor pain and request assistance  - Assess pain using appropriate pain scale  - Administer analgesics based on type and severity of pain and evaluate response  - Implement non-pharmacological measures as appropriate and evaluate response  - Consider cultural and social influences on pain and pain management  - Manage/alleviate anxiety  - Utilize distraction and/or relaxation techniques  - Monitor for opioid side effects  - Notify MD/LIP if interventions unsuccessful or patient reports new pain  - Anticipate increased pain with activity and pre-medicate as appropriate  Outcome: Progressing     Problem: RISK FOR INFECTION - ADULT  Goal: Absence of fever/infection during anticipated neutropenic period  Description: INTERVENTIONS  - Monitor WBC  - Administer growth factors as ordered  - Implement neutropenic guidelines  Outcome: Progressing     Problem: SAFETY ADULT - FALL  Goal: Free from fall injury  Description: INTERVENTIONS:  - Assess pt frequently for physical  needs  - Identify cognitive and physical deficits and behaviors that affect risk of falls.  - [place] fall precautions as indicated by assessment.  - Educate pt/family on patient safety including physical limitations  - Instruct pt to call for assistance with activity based on assessment  - Modify environment to reduce risk of injury  - Provide assistive devices as appropriate  - Consider OT/PT consult to assist with strengthening/mobility  - Encourage toileting schedule  Outcome: Progressing     Problem: SKIN/TISSUE INTEGRITY - ADULT  Goal: Skin integrity remains intact  Description: INTERVENTIONS  - Assess and document risk factors for pressure ulcer development  - Assess and document skin integrity  - Monitor for areas of redness and/or skin breakdown  - Initiate interventions, skin care algorithm/standards of care as needed  Outcome: Progressing  Goal: Incision(s), wounds(s) or drain site(s) healing without S/S of infection  Description: INTERVENTIONS:  - Assess and document risk factors for pressure ulcer development  - Assess and document skin integrity  - Assess and document dressing/incision, wound bed, drain sites and surrounding tissue  - Implement wound care per orders  - Initiate isolation precautions as appropriate  - Initiate Pressure Ulcer prevention bundle as indicated  Outcome: Progressing  Goal: Oral mucous membranes remain intact  Description: INTERVENTIONS  - Assess oral mucosa and hygiene practices  - Implement preventative oral hygiene regimen  - Implement oral medicated treatments as ordered  Outcome: Progressing     Problem: Delirium  Goal: Minimize duration of delirium  Description: Interventions:  - Encourage use of hearing aids, eye glasses  - Promote highest level of mobility daily  - Provide frequent reorientation  - Promote wakefulness i.e. lights on, blinds open  - Promote sleep, encourage patient's normal rest cycle i.e. lights off, TV off, minimize noise and interruptions  -  Encourage family to assist in orientation and promotion of home routines  Outcome: Progressing

## 2024-08-24 NOTE — CM/SW NOTE
Department  notified of request for marie [facility] referrals started. Assigned CM/SW to follow up with pt/family on further discharge planning.     Nataliya Luna, DSC

## 2024-08-24 NOTE — CM/SW NOTE
PT rec: SNF, referral uploaded, will need acceptance/choice/PASRR, and insurance auth if agreeable.      SW/CM to remain available for support and/or discharge planning.      Jenny BERG, LSW  Social Work/Case Management

## 2024-08-24 NOTE — PROGRESS NOTES
[facility] ID PROGRESS NOTE    Margaret Silverio Patient Status:  Inpatient    3/14/1945 MRN [id number]   Location [facility] 2W/SW Attending Veto Zhang MD   Hosp Day # 12 PCP Johnathon Rodriguez, DO     SUBJECTIVE  ROS done. Transferred to ICU yesterday. Febrile yesterday evening to 101.6.HR WNL. MAPs 60s this AM. RN reports small liquid stools, 3 charted yesterday. On exam, awake and weak appearing. HDS. No sob or distress on 1L NC. No abdom pain or nausea.     ASSESSMENT:     Antibiotics: None;  (IV ceftriaxone -)     # Fever  # Staph not aureus bacteremia in 1/2 sets on 24 - suspect contaminant - repeat pending; r/o IE               - has bioprosthetic MV and L chest PPM     # Leukocytosis - suspect reactive; r/o bacteremia; repeat cx pending  # GEORGE on CKD  # Anemia s/p transfusion      # Sacral wound stage 2 s/p OR debridement 24 - cx E.coli, anaerobes - currently no signs of infection     # Asymptomatic bacteruria - E. coli  # Intermittent AMS    # RA, bedbound, previously on MTX and prednisone               - received prednisone at last discharge; currently off related to limited PO     PLAN:     -  Monitor closely off abx.  -  FU cx.   -  Given recurrent fever iso MVR + PPM, check TTE.   -  Also given incr wbc (prior to steroids) + liquid stools, send CDPCR.   -  wound care  -  FU GOC. Palliative following.  -  Follow fever curve, wbc. Noted pt given 1x iv solu-medrol today.  -  Reviewed labs, micro, imaging reports  -  d/w patient, staff     History of Present Illness:  Margaret Silverio is a a(n) 79 year old female. Is a 79-year-old female with a history of severe nonischemic cardiomyopathy with multiple admissions for heart failure, A-fib, CKD, severe RA with multiple joints involved was recently discharged about 1 month prior.  During that admission had a large left pleural effusion on dobutamine, Bumex, thoracentesis, left and  right heart cath showing normal coronaries but severe volume overload.  Also had GEORGE and leukopenia.  Now admitted on 8/12 with painful decubitus ulcer.  Patient is bedbound with severe RA on methotrexate and prednisone.  Afebrile, 2 L nasal cannula.  Seen by general surgery and taken the OR on 8/13 status post excision of cyst and sharp debridement of the decubitus ulcer.  Cultures with E. coli and anaerobes.  IV ceftriaxone for sacral wound and UTI completed 8/21.  Now with fever of 100.2 on 8/22 with increased WBC.  Blood culture sent with staph not aureus in 1 out of 2 sets.  Chest x-ray with no new focal opacity with persistent edema noted.  Goals of care discussion ongoing related to poor appetite, pain control.     OBJECTIVE    /60 (BP Location: Right arm)   Pulse 60   Temp 96.8 °F (36 °C) (Temporal)   Resp 25   Wt 120 lb (54.4 kg)   SpO2 99%   BMI 20.60 kg/m²     Gen:   Awake, weak appearing  HEENT:  EOMI, neck supple, 1L NC  CV/lungs:  RRR, CTAB  Abdom:  Soft, NT/ND, +BS  Skin/extrem:  No rashes, no c/c/e, hand contractures noted  :   Primafit+  Lines:    Laboratory Data: Reviewed     Microbiology: Reviewed     Radiology: Reviewed    [facility]  [phone]

## 2024-08-24 NOTE — PLAN OF CARE
Problem: PAIN - ADULT  Goal: Verbalizes/displays adequate comfort level or patient's stated pain goal  Description: INTERVENTIONS:  - Encourage pt to monitor pain and request assistance  - Assess pain using appropriate pain scale  - Administer analgesics based on type and severity of pain and evaluate response  - Implement non-pharmacological measures as appropriate and evaluate response  - Consider cultural and social influences on pain and pain management  - Manage/alleviate anxiety  - Utilize distraction and/or relaxation techniques  - Monitor for opioid side effects  - Notify MD/LIP if interventions unsuccessful or patient reports new pain  - Anticipate increased pain with activity and pre-medicate as appropriate  Outcome: Progressing     Problem: RISK FOR INFECTION - ADULT  Goal: Absence of fever/infection during anticipated neutropenic period  Description: INTERVENTIONS  - Monitor WBC  - Administer growth factors as ordered  - Implement neutropenic guidelines  Outcome: Progressing     Problem: SAFETY ADULT - FALL  Goal: Free from fall injury  Description: INTERVENTIONS:  - Assess pt frequently for physical needs  - Identify cognitive and physical deficits and behaviors that affect risk of falls.  - [place] fall precautions as indicated by assessment.  - Educate pt/family on patient safety including physical limitations  - Instruct pt to call for assistance with activity based on assessment  - Modify environment to reduce risk of injury  - Provide assistive devices as appropriate  - Consider OT/PT consult to assist with strengthening/mobility  - Encourage toileting schedule  Outcome: Progressing     Problem: SKIN/TISSUE INTEGRITY - ADULT  Goal: Skin integrity remains intact  Description: INTERVENTIONS  - Assess and document risk factors for pressure ulcer development  - Assess and document skin integrity  - Monitor for areas of redness and/or skin breakdown  - Initiate interventions, skin care  algorithm/standards of care as needed  Outcome: Progressing  Goal: Incision(s), wounds(s) or drain site(s) healing without S/S of infection  Description: INTERVENTIONS:  - Assess and document risk factors for pressure ulcer development  - Assess and document skin integrity  - Assess and document dressing/incision, wound bed, drain sites and surrounding tissue  - Implement wound care per orders  - Initiate isolation precautions as appropriate  - Initiate Pressure Ulcer prevention bundle as indicated  Outcome: Progressing  Goal: Oral mucous membranes remain intact  Description: INTERVENTIONS  - Assess oral mucosa and hygiene practices  - Implement preventative oral hygiene regimen  - Implement oral medicated treatments as ordered  Outcome: Progressing     Problem: Delirium  Goal: Minimize duration of delirium  Description: Interventions:  - Encourage use of hearing aids, eye glasses  - Promote highest level of mobility daily  - Provide frequent reorientation  - Promote wakefulness i.e. lights on, blinds open  - Promote sleep, encourage patient's normal rest cycle i.e. lights off, TV off, minimize noise and interruptions  - Encourage family to assist in orientation and promotion of home routines  Outcome: Progressing

## 2024-08-24 NOTE — PROGRESS NOTES
[facility]  part of [facility] Hospitalist Progress Note     Margaret Silverio Patient Status:  Inpatient    3/14/1945 MRN [id number]   Location [facility] POST ANESTHESIA CARE UNIT Attending Fernando Galan MD   Hosp Day # 12 PCP Johnathon Rodriguez DO     Subjective:     Pt was seen and examined.   Waxing and waning mentation. Transferred to ICU overnight for closer monitoring.       Objective:    Review of Systems:   ROS completed; pertinent positive and negatives stated in subjective.      Vital signs:  Temp:  [96.8 °F (36 °C)-101.6 °F (38.7 °C)] 96.8 °F (36 °C)  Pulse:  [60-65] 60  Resp:  [14-29] 25  BP: ()/(49-66) 133/60  SpO2:  [97 %-100 %] 99 %      Physical Exam:    Gen: alert, oriented x1  Chest: good air entry CTABL  CVS: normal s1 and s2 RR  Abd: NABS soft NT ND  Neuro: non-focal, following commands  Skin: decub dressing CDI  Ext: no edema in bilateral LE      Diagnostic Data:    Labs:  Recent Labs   Lab 24  0626 24  0555 24  0643 24  0426 24  0423   WBC 8.3 8.5 11.9* 15.1* 25.5*   HGB 8.1* 9.5* 8.4* 8.9* 8.2*   MCV 95.2 97.8 93.8 97.7 97.5   .0* 671.0* 658.0* 565.0* 543.0*   BAND 1 2 4 5 2       Recent Labs   Lab 24  0643 24  0426 24  0423   * 80 87   BUN 46* 42* 50*   CREATSERUM 1.44* 1.58* 2.01*   CA 8.8 9.1 9.1   ALB 3.1* 3.2 3.2   * 133* 135*   K 4.2 4.4 4.7    104 105   CO2 20.0* 21.0 21.0   ALKPHO 155* 154* 160*   AST 17 15 14   ALT <7* <7* <7*   BILT 0.4 0.5 0.5   TP 6.3 6.6 6.5       Estimated Creatinine Clearance: 19.8 mL/min (A) (based on SCr of 2.01 mg/dL (H)).    No results for input(s): \"PTP\", \"INR\" in the last 168 hours.           Imaging: Imaging data reviewed in Epic.    Medications:    predniSONE  20 mg Oral Daily with breakfast    levETIRAcetam  500 mg Intravenous Daily    heparin  5,000 Units Subcutaneous Q8H BROOKLYNN    acetaminophen  650 mg Oral Q8H    patiromer  8.4 g  Oral Once    sacubitril-valsartan  1 tablet Oral BID    bumetanide  1 mg Oral Daily    metoprolol succinate  25 mg Oral Daily Beta Blocker    midodrine  5 mg Oral TID    sodium hypochlorite   Topical BID    amiodarone  200 mg Oral Daily    fentaNYL  1 patch Transdermal Q72H    ferrous sulfate  325 mg Oral Daily with breakfast    folic acid  800 mcg Oral Daily    gabapentin  300 mg Oral TID    [Held by provider] isosorbide dinitrate  20 mg Oral TID (Nitrates)    pantoprazole  40 mg Oral BID AC       Assessment & Plan:     GPC bacteremia  Leukocytosis  Sacral decubitus ulcer  Gsx on consult  Now s/p Excision of cyst, sharp excisional debridement of buttock decubitus ulcer POD #11  PRN pain control  Completed 10 days of abx  WBC 8.5 ->11.9 -> 15 -> 25.5  No fever curve, no obv source noted  Infectious workup negative so far  UA and repeat Bcx pending  ID on consult  Hold off of abx at this time  Repeat Bcx, follow previous cultures  Continue steroids  ICU on consult - appreciate recs  AMS -> intermittent   Unclear etiology, consider metabolic encephalopathy  Neurology following, EEG WNL  Keppra resumed  CT head non-acute  ABG reviewed  Hyponatremia  Na 129 -> 133 -> 135  Continue D5NS  Monitor labs  UTI  UA reviewed  Ucx pending -> e. coli  Continue IV abx - completed course of treatment  NICM, HFrEF  Hx of paroxysmal afib  Hx of BRANDYN occlusion  AV paced  Continue home meds  GEORGE on CKD 3 - stable  Monitor renal function  Avoid nephrotoxic agents  Anemia  Hgb stable today  Cont to monitor   Thrombocytosis  Monitor labs  Heparin s/c for DVT ppx  Nutrition  Dietitian on consult  Encouraged to eat more  Family reports the patient would not want a feeding tube  GOC  Palliative care following. Patient and family okay with CPC  Appreciate CM/SW placement    Plan of care discussed with RN at bedside       Goals of care: Palliative care has been consulted, discussions on going regarding DC plan, daughter will discuss with family  before making a decision on Hospice care. Otherwise will dc with home palliative care.     Supplementary Documentation:     Quality:  DVT Prophylaxis: Heparin s/c  CODE status: DNAR/Select      Estimated date of discharge: TBD  Discharge is dependent on: clinical stability  At this point Ms. Silverio is expected to be discharge to: TBD    MDM: High

## 2024-08-25 LAB
ALBUMIN SERPL-MCNC: 3.2 G/DL (ref 3.2–4.8)
ALBUMIN/GLOB SERPL: 0.9 {RATIO} (ref 1–2)
ALP LIVER SERPL-CCNC: 168 U/L
ALT SERPL-CCNC: <7 U/L
ANION GAP SERPL CALC-SCNC: 10 MMOL/L (ref 0–18)
AST SERPL-CCNC: 12 U/L (ref ?–34)
BILIRUB SERPL-MCNC: 0.4 MG/DL (ref 0.2–1.1)
BUN BLD-MCNC: 42 MG/DL (ref 9–23)
BUN/CREAT SERPL: 22.1 (ref 10–20)
CALCIUM BLD-MCNC: 9 MG/DL (ref 8.7–10.4)
CHLORIDE SERPL-SCNC: 107 MMOL/L (ref 98–112)
CO2 SERPL-SCNC: 18 MMOL/L (ref 21–32)
CREAT BLD-MCNC: 1.9 MG/DL
EGFRCR SERPLBLD CKD-EPI 2021: 27 ML/MIN/1.73M2 (ref 60–?)
GLOBULIN PLAS-MCNC: 3.4 G/DL (ref 2–3.5)
GLUCOSE BLD-MCNC: 135 MG/DL (ref 70–99)
GLUCOSE BLDC GLUCOMTR-MCNC: 118 MG/DL (ref 70–99)
GLUCOSE BLDC GLUCOMTR-MCNC: 123 MG/DL (ref 70–99)
GLUCOSE BLDC GLUCOMTR-MCNC: 134 MG/DL (ref 70–99)
GLUCOSE BLDC GLUCOMTR-MCNC: 165 MG/DL (ref 70–99)
OSMOLALITY SERPL CALC.SUM OF ELEC: 293 MOSM/KG (ref 275–295)
POTASSIUM SERPL-SCNC: 4.9 MMOL/L (ref 3.5–5.1)
PROT SERPL-MCNC: 6.6 G/DL (ref 5.7–8.2)
SODIUM SERPL-SCNC: 135 MMOL/L (ref 136–145)

## 2024-08-25 PROCEDURE — 99233 SBSQ HOSP IP/OBS HIGH 50: CPT | Performed by: INTERNAL MEDICINE

## 2024-08-25 PROCEDURE — 99232 SBSQ HOSP IP/OBS MODERATE 35: CPT | Performed by: INTERNAL MEDICINE

## 2024-08-25 NOTE — CONSULTS
[facility]  part of [facility]    Report of Consultation    Margaret Silverio Patient Status:  Inpatient    3/14/1945 MRN [id number]   Location [facility] 2W/SW Attending Veto Zhang MD   Hosp Day # 12 PCP Johnathon Rodriguez DO     Date of Admission:  2024    Reason for Consultation:   Altered mental status    History of Present Illness:   Patient is a 79-year-old female with history of rheumatoid arthritis ischemic cardiomyopathy who presented with chief complaint of worsening decubitus ulcer.  Patient primarily bedbound.  Patient with excisional debridement of ulcer earlier during hospital course.  Worsening altered mental status noted prompting transfer to intensive care.  Tmax 101.6 degrees.  Unable to provide any meaningful history otherwise.  Hemodynamically stable.    Past Medical History  Past Medical History:    Acute kidney insufficiency    Anemia    Arrhythmia    Back problem    CHF (congestive heart failure) (HCC)    CKD (chronic kidney disease) stage 3, GFR 30-59 ml/min (HCC)    Deep vein thrombosis (HCC)    on B.T for only a month, possibly Magen    Essential hypertension    High blood pressure    History of blood transfusion    Rheumatoid arthritis (HCC)    Seizure disorder (HCC)    Pt denied at screening call 24       Past Surgical History  Past Surgical History:   Procedure Laterality Date    Cabg      Cardiac pacemaker placement      Colonoscopy N/A 2024    Dr. Rios    Colonoscopy N/A 2024    Procedure: COLONOSCOPY;  Surgeon: Zoraida Rios MD;  Location: [facility] ENDOSCOPY    Egd N/A 2024    Dr. Rios    Fracture surgery      Other surgical history      Heart Surgery     Other surgical history      Bilateral shoulder replacement        Family History  No family history on file.    Social History  Social History     Socioeconomic History    Marital status:    Tobacco Use    Smoking status: Former     Current packs/day: 0.00      Types: Cigarettes     Quit date: 2014     Years since quitting: 10.6    Smokeless tobacco: Never   Vaping Use    Vaping status: Never Used   Substance and Sexual Activity    Alcohol use: Never    Drug use: Never           Current Medications:  Current Facility-Administered Medications   Medication Dose Route Frequency    vancomycin (Vancocin) cap 250 mg  250 mg Oral QID    HYDROcodone-acetaminophen (Norco)  MG per tab 1 tablet  1 tablet Oral Q4H PRN    levETIRAcetam (Keppra) 500 mg/5mL injection 500 mg  500 mg Intravenous Daily    dextrose 5%-sodium chloride 0.9% infusion   Intravenous Continuous    heparin (Porcine) 5000 UNIT/ML injection 5,000 Units  5,000 Units Subcutaneous Q8H BROOKLYNN    HYDROmorphone (Dilaudid) 1 MG/ML injection 0.2 mg  0.2 mg Intravenous Q4H PRN    acetaminophen (Tylenol) 160 MG/5ML oral liquid 650 mg  650 mg Oral Q8H    senna-docusate (Senokot-S) 8.6-50 MG per tab 2 tablet  2 tablet Oral Daily PRN    polyethylene glycol (PEG 3350) (Miralax) 17 g oral packet 17 g  17 g Oral Daily PRN    patiromer (Veltassa) 8.4 g oral packet 8.4 g  8.4 g Oral Once    glucose (Dex4) 15 GM/59ML oral liquid 15 g  15 g Oral Q15 Min PRN    Or    glucose (Glutose) 40% oral gel 15 g  15 g Oral Q15 Min PRN    Or    glucose-vitamin C (Dex-4) chewable tab 4 tablet  4 tablet Oral Q15 Min PRN    Or    dextrose 50% injection 50 mL  50 mL Intravenous Q15 Min PRN    Or    glucose (Dex4) 15 GM/59ML oral liquid 30 g  30 g Oral Q15 Min PRN    Or    glucose (Glutose) 40% oral gel 30 g  30 g Oral Q15 Min PRN    Or    glucose-vitamin C (Dex-4) chewable tab 8 tablet  8 tablet Oral Q15 Min PRN    traMADol (Ultram) tab 50 mg  50 mg Oral Q12H PRN    sacubitril-valsartan (Entresto) 24-26 MG per tab 1 tablet  1 tablet Oral BID    bumetanide (Bumex) tab 1 mg  1 mg Oral Daily    metoprolol succinate ER (Toprol XL) 24 hr tab 25 mg  25 mg Oral Daily Beta Blocker    midodrine (ProAmatine) tab 5 mg  5 mg Oral TID    ondansetron (Zofran) 4  MG/2ML injection 4 mg  4 mg Intravenous Q6H PRN    metoclopramide (Reglan) 5 mg/mL injection 5 mg  5 mg Intravenous Q8H PRN    sodium hypochlorite (Dakin's) 0.125 % external solution   Topical BID    acetaminophen (Tylenol Extra Strength) tab 500 mg  500 mg Oral Q4H PRN    amiodarone (Pacerone) tab 200 mg  200 mg Oral Daily    fentaNYL (Duragesic) 12 MCG/HR patch 1 patch  1 patch Transdermal Q72H    ferrous sulfate DR tab 325 mg  325 mg Oral Daily with breakfast    folic acid (Folvite) tab 800 mcg  800 mcg Oral Daily    gabapentin (Neurontin) cap 300 mg  300 mg Oral TID    [Held by provider] isosorbide dinitrate (Isordil) tab 20 mg  20 mg Oral TID (Nitrates)    pantoprazole (Protonix) DR tab 40 mg  40 mg Oral BID AC     Medications Prior to Admission   Medication Sig    bumetanide 1 MG Oral Tab Take 1 tablet (1 mg total) by mouth daily.    carvedilol 6.25 MG Oral Tab Take 0.5 tablets (3.125 mg total) by mouth 2 (two) times daily with meals.    sennosides (SENNA-TIME) 8.6 MG Oral Tab senna 8.6 mg tablet, [RxNorm: 234790]    cyclobenzaprine 10 MG Oral Tab Take 1 tablet (10 mg total) by mouth nightly.    fentaNYL 12 MCG/HR Transdermal Patch 72 Hr Place 1 patch onto the skin every third day. (Patient taking differently: Place 1 patch onto the skin every third day. Placed 8/10 left arm)    midodrine 5 MG Oral Tab Take 1 tablet (5 mg total) by mouth 2 (two) times daily before meals.    senna-docusate 8.6-50 MG Oral Tab Take 2 tablets by mouth daily.    isosorbide dinitrate 20 MG Oral Tab Take 1 tablet (20 mg total) by mouth TID (Nitrates).    gabapentin 300 MG Oral Cap Take 1 capsule (300 mg total) by mouth 3 (three) times daily.    dapagliflozin (FARXIGA) 10 MG Oral Tab Take 1 tablet (10 mg total) by mouth daily.    spironolactone 25 MG Oral Tab     alendronate 70 MG Oral Tab Take 1 tablet (70 mg total) by mouth once a week. (Patient taking differently: Take 1 tablet (70 mg total) by mouth once a week. Every Tuesday)     methotrexate 2.5 MG Oral Tab TAKE 6 TABLETS BY MOUTH 1 TIME A WEEK    lidocaine-menthol 4-1 % External Patch Place 1 patch onto the skin daily.    PANTOPRAZOLE 40 MG Oral Tab EC TAKE 1 TABLET(40 MG) BY MOUTH TWICE DAILY BEFORE MEALS    SACUBITRIL-VALSARTAN 49-51 MG Oral Tab Take 1 tablet by mouth 2 (two) times daily.    folic acid 800 MCG Oral Tab Take 1 tablet (800 mcg total) by mouth daily.    amiodarone 200 MG Oral Tab Take 1 tablet (200 mg total) by mouth daily.    ferrous sulfate 325 (65 FE) MG Oral Tab EC Take 1 tablet (325 mg total) by mouth daily with breakfast.    HYDROcodone-acetaminophen (NORCO)  MG Oral Tab Take 1 tablet by mouth every 8 (eight) hours as needed for Pain.    polyethylene glycol, PEG 3350, 17 g Oral Powd Pack Take 17 g by mouth daily as needed.    [] predniSONE 20 MG Oral Tab Take 1 tablet (20 mg total) by mouth daily with breakfast for 5 days.    Naloxone HCl 4 MG/0.1ML Nasal Liquid 4 mg by Nasal route as needed. If patient remains unresponsive, repeat dose in other nostril 2-5 minutes after first dose.    acetaminophen (TYLENOL EXTRA STRENGTH) 500 MG Oral Tab Take 1 tablet (500 mg total) by mouth every 6 (six) hours as needed for Pain.    [] gabapentin 300 MG Oral Cap Take 1 capsule (300 mg total) by mouth 2 (two) times daily. (Patient not taking: Reported on 2024)       Allergies  Allergies   Allergen Reactions    Lisinopril Coughing       Review of Systems:   Unable to obtain secondary to patient's current clinical condition        Physical Exam:   Blood pressure 118/55, pulse 60, temperature 96.8 °F (36 °C), temperature source Temporal, resp. rate 20, weight 120 lb (54.4 kg), SpO2 100%.    Constitutional: no acute distress, opens eyes suggestive  Eyes: PERRL  ENT: nares patent  Neck: neck supple, no JVD  Cardio: RRR, S1 S2  Respiratory: Basilar crackle  GI: abdomen soft, non tender, active bowel souds, no organomegaly  Extremities: no clubbing, cyanosis,  edema  Neurologic: no gross motor deficits  Skin: warm, dry    Results:   Laboratory Data  Lab Results   Component Value Date    WBC 25.5 (H) 08/24/2024    HGB 8.2 (L) 08/24/2024    HCT 27.1 (L) 08/24/2024    .0 (H) 08/24/2024    CREATSERUM 2.01 (H) 08/24/2024    BUN 50 (H) 08/24/2024     (L) 08/24/2024    K 4.7 08/24/2024     08/24/2024    CO2 21.0 08/24/2024    GLU 87 08/24/2024    CA 9.1 08/24/2024    ALB 3.2 08/24/2024    ALKPHO 160 (H) 08/24/2024    TP 6.5 08/24/2024    AST 14 08/24/2024    ALT <7 (L) 08/24/2024    PTT 35.2 02/09/2024    INR 1.25 (H) 02/09/2024    PTP 16.5 (H) 02/09/2024    T4F 1.4 06/18/2024    TSH 7.534 (H) 06/18/2024    LIP 32 01/13/2024    ESRML 40 (H) 07/17/2024    CRP 9.00 (H) 07/17/2024    MG 2.1 08/21/2024    PHOS 2.9 08/21/2024    B12 >2,000 (H) 07/01/2024         Imaging  No results found.    Assessment   1.  Sacral decubitus ulcer status post debridement  2.  Leukocytosis  3.  Toxic metabolic encephalopathy  4.  Hyponatremia  5.  Nonischemic cardiomyopathy  6.  History of paroxysmal atrial fibrillation  7.  Anemia  8.  Acute kidney injury on chronic kidney disease  9.  Fevers  10.  Staph aureus bacteremia    Plan   -Patient presented with evidence of worsening sacral wound status post debridement on 8/13/2024.  Cultures positive for E. coli.  Completed course of antibiotic therapy.  -Most recent blood cultures 1 of 2 positive on 8/22/2024 for Staph aureus.  Seen by ID concern for contaminant.  -Monitoring of IV antibiotic therapy at this time  -Hold off stress dose steroids at this time  -Monitor fever curve  -Wound care  -CT head with no acute findings seen  -Close neuromonitoring  -Closely monitor renal function and urine output  -DVT prophylaxis: Heparin  -Patient DNR/DNI  -Reviewed vitals, labs and imaging    Augustin Parsons DO  Pulmonary Critical Care Medicine  [facility]  8/24/2024  8:07 PM

## 2024-08-25 NOTE — PROGRESS NOTES
[facility] ID PROGRESS NOTE    Margaret Silverio Patient Status:  Inpatient    3/14/1945 MRN [id number]   Location [facility] 2W/SW Attending Veto Zhang MD   Hosp Day # 13 PCP Johnathon Rodriguez, DO     SUBJECTIVE  ROS done. Transferred to ICU .   More alert and comfortable  Temps are down  C diff was not yet sent to requested  Wbc is up to 40 K but pt did get a dose of solumedrol per notes    ASSESSMENT:     Antibiotics: None;  (IV ceftriaxone -)     # Fever-better  Repeat cx NGTD  # Staph not aureus bacteremia in 1/2 sets on 24 - suspect contaminant - repeat pending; r/o IE               - has bioprosthetic MV and L chest PPM     # Leukocytosis - suspect reactive; r/o bacteremia; repeat cx \NGTD  -also go 1 dose of solumedrol  # GEORGE on CKD  # Anemia s/p transfusion      # Sacral wound stage 2 s/p OR debridement 24 - cx E.coli, anaerobes - currently no signs of infection     # Asymptomatic bacteruria - E. coli  # Intermittent AMS    # RA, bedbound, previously on MTX and prednisone               - received prednisone at last discharge; currently off related to limited PO     PLAN:     -  given increased wbc, will add meropenem and vanco  -  FU cx.   -  Given recurrent fever iso MVR + PPM, check TTE.   -  Also given incr wbc (prior to steroids) + liquid stools, send CDPCR.   Was not sent so requested again  -  wound care  -  FU GOC. Palliative following.  -  Follow fever curve, wbc. To see improvement. Temps are down  -  Reviewed labs, micro, imaging reports     History of Present Illness:  Margaret Silverio is a a(n) 79 year old female. Is a 79-year-old female with a history of severe nonischemic cardiomyopathy with multiple admissions for heart failure, A-fib, CKD, severe RA with multiple joints involved was recently discharged about 1 month prior.  During that admission had a large left pleural effusion on dobutamine, Bumex,  thoracentesis, left and right heart cath showing normal coronaries but severe volume overload.  Also had GEORGE and leukopenia.  Now admitted on 8/12 with painful decubitus ulcer.  Patient is bedbound with severe RA on methotrexate and prednisone.  Afebrile, 2 L nasal cannula.  Seen by general surgery and taken the OR on 8/13 status post excision of cyst and sharp debridement of the decubitus ulcer.  Cultures with E. coli and anaerobes.  IV ceftriaxone for sacral wound and UTI completed 8/21.  Now with fever of 100.2 on 8/22 with increased WBC.  Blood culture sent with staph not aureus in 1 out of 2 sets.  Chest x-ray with no new focal opacity with persistent edema noted.  Goals of care discussion ongoing related to poor appetite, pain control.     OBJECTIVE    /63 (BP Location: Right arm)   Pulse 60   Temp 96.9 °F (36.1 °C) (Temporal)   Resp 15   Wt 120 lb (54.4 kg)   SpO2 100%   BMI 20.60 kg/m²     Gen:   Awake, weak appearing  HEENT:  EOMI, neck supple, 1L NC  CV/lungs:  RRR, CTAB  Abdom:  Soft, NT/ND, +BS  Skin/extrem:  No rashes, no c/c/e, hand contractures noted  :   Primafit+  Lines:    Laboratory Data: Reviewed     Microbiology: Reviewed     Radiology: Reviewed    [facility]  [phone]

## 2024-08-25 NOTE — PLAN OF CARE
Problem: PAIN - ADULT  Goal: Verbalizes/displays adequate comfort level or patient's stated pain goal  Description: INTERVENTIONS:  - Encourage pt to monitor pain and request assistance  - Assess pain using appropriate pain scale  - Administer analgesics based on type and severity of pain and evaluate response  - Implement non-pharmacological measures as appropriate and evaluate response  - Consider cultural and social influences on pain and pain management  - Manage/alleviate anxiety  - Utilize distraction and/or relaxation techniques  - Monitor for opioid side effects  - Notify MD/LIP if interventions unsuccessful or patient reports new pain  - Anticipate increased pain with activity and pre-medicate as appropriate  Outcome: Progressing     Problem: RISK FOR INFECTION - ADULT  Goal: Absence of fever/infection during anticipated neutropenic period  Description: INTERVENTIONS  - Monitor WBC  - Administer growth factors as ordered  - Implement neutropenic guidelines  Outcome: Progressing     Problem: Delirium  Goal: Minimize duration of delirium  Description: Interventions:  - Encourage use of hearing aids, eye glasses  - Promote highest level of mobility daily  - Provide frequent reorientation  - Promote wakefulness i.e. lights on, blinds open  - Promote sleep, encourage patient's normal rest cycle i.e. lights off, TV off, minimize noise and interruptions  - Encourage family to assist in orientation and promotion of home routines  Outcome: Progressing

## 2024-08-25 NOTE — PROGRESS NOTES
[facility] Pharmacy Dosing Service      Initial Pharmacokinetic Consult for Vancomycin Dosing     Margaret Silverio is a 79 year old female who is being initiated on vancomycin therapy for sepsis.  Pharmacy has been asked to dose vancomycin by Fide Jenkins.  The initial treatment and monitoring approach will be non-AUC strategy.        Weight and Temperature:    Wt Readings from Last 1 Encounters:   24 54.4 kg (120 lb)        Temp Readings from Last 1 Encounters:   24 96.9 °F (36.1 °C) (Temporal)      Labs:   Recent Labs   Lab 24  0426 24  0423 24  0730   CREATSERUM 1.58* 2.01* 1.90*      Estimated Creatinine Clearance: 21 mL/min (A) (based on SCr of 1.9 mg/dL (H)).     Recent Labs   Lab 24  0426 24  0423 24  0435   WBC 15.1* 25.5* 40.6*          The Pharmacokinetic Target is:    Trough/random 10-15 mg/L    Renal Dosing Considerations:    GEORGE/ARF     Assessment/Plan:   Initial/Loading dose: Will receive 750 mg IV (15 mg/kg, capped at 2250 mg) x 1 initial dose.      Maintenance dose: Pharmacy will dose vancomycin per levels    Monitorin) Plan for vancomycin trough to be obtained in approximately 48 hours    2) Pharmacy will order SCr as clinically indicated to assess renal function.    3) Pharmacy will monitor for toxicity and efficacy, adjust vancomycin dose and/or frequency, and order vancomycin levels as appropriate per the Pharmacy and Therapeutics Committee approved protocol until discontinuation of the medication.       We appreciate the opportunity to assist in the care of this patient.     Odilon Gilmore, PharmD  2024  10:05 AM  [facility]  Pharmacy Extension: [phone]

## 2024-08-25 NOTE — PLAN OF CARE
PT resting in bed. VSS. 1L O2. Incontinent x2. Stool sample sent. No significant events. More withdrawn this AM but did take all medications.    Problem: Patient Centered Care  Goal: Patient preferences are identified and integrated in the patient's plan of care  Description: Interventions:  - What would you like us to know as we care for you?   - Provide timely, complete, and accurate information to patient/family  - Incorporate patient and family knowledge, values, beliefs, and cultural backgrounds into the planning and delivery of care  - Encourage patient/family to participate in care and decision-making at the level they choose  - Honor patient and family perspectives and choices  Outcome: Progressing     Problem: Patient/Family Goals  Goal: Patient/Family Long Term Goal  Description: Patient's Long Term Goal:    Interventions:  - See additional Care Plan goals for specific interventions  Outcome: Progressing  Goal: Patient/Family Short Term Goal  Description: Patient's Short Term Goal:     Interventions:   - See additional Care Plan goals for specific interventions  Outcome: Progressing     Problem: PAIN - ADULT  Goal: Verbalizes/displays adequate comfort level or patient's stated pain goal  Description: INTERVENTIONS:  - Encourage pt to monitor pain and request assistance  - Assess pain using appropriate pain scale  - Administer analgesics based on type and severity of pain and evaluate response  - Implement non-pharmacological measures as appropriate and evaluate response  - Consider cultural and social influences on pain and pain management  - Manage/alleviate anxiety  - Utilize distraction and/or relaxation techniques  - Monitor for opioid side effects  - Notify MD/LIP if interventions unsuccessful or patient reports new pain  - Anticipate increased pain with activity and pre-medicate as appropriate  Outcome: Progressing     Problem: RISK FOR INFECTION - ADULT  Goal: Absence of fever/infection during  anticipated neutropenic period  Description: INTERVENTIONS  - Monitor WBC  - Administer growth factors as ordered  - Implement neutropenic guidelines  Outcome: Progressing     Problem: SAFETY ADULT - FALL  Goal: Free from fall injury  Description: INTERVENTIONS:  - Assess pt frequently for physical needs  - Identify cognitive and physical deficits and behaviors that affect risk of falls.  - [place] fall precautions as indicated by assessment.  - Educate pt/family on patient safety including physical limitations  - Instruct pt to call for assistance with activity based on assessment  - Modify environment to reduce risk of injury  - Provide assistive devices as appropriate  - Consider OT/PT consult to assist with strengthening/mobility  - Encourage toileting schedule  Outcome: Progressing     Problem: SKIN/TISSUE INTEGRITY - ADULT  Goal: Skin integrity remains intact  Description: INTERVENTIONS  - Assess and document risk factors for pressure ulcer development  - Assess and document skin integrity  - Monitor for areas of redness and/or skin breakdown  - Initiate interventions, skin care algorithm/standards of care as needed  Outcome: Progressing  Goal: Incision(s), wounds(s) or drain site(s) healing without S/S of infection  Description: INTERVENTIONS:  - Assess and document risk factors for pressure ulcer development  - Assess and document skin integrity  - Assess and document dressing/incision, wound bed, drain sites and surrounding tissue  - Implement wound care per orders  - Initiate isolation precautions as appropriate  - Initiate Pressure Ulcer prevention bundle as indicated  Outcome: Progressing  Goal: Oral mucous membranes remain intact  Description: INTERVENTIONS  - Assess oral mucosa and hygiene practices  - Implement preventative oral hygiene regimen  - Implement oral medicated treatments as ordered  Outcome: Progressing     Problem: Delirium  Goal: Minimize duration of delirium  Description:  Interventions:  - Encourage use of hearing aids, eye glasses  - Promote highest level of mobility daily  - Provide frequent reorientation  - Promote wakefulness i.e. lights on, blinds open  - Promote sleep, encourage patient's normal rest cycle i.e. lights off, TV off, minimize noise and interruptions  - Encourage family to assist in orientation and promotion of home routines  Outcome: Progressing

## 2024-08-25 NOTE — PROGRESS NOTES
[facility]  part of [facility]     Progress Note    Margaret Silverio Patient Status:  Inpatient    3/14/1945 MRN [id number]   Location [facility] 2W/SW Attending Veto Zhang MD   Hosp Day # 13 PCP Johnathon Rodriguez DO       Subjective:   Patient seen and examined.  Lethargic.  No significant distress.    Objective:   Blood pressure 126/63, pulse 60, temperature 96.9 °F (36.1 °C), temperature source Temporal, resp. rate 15, weight 120 lb (54.4 kg), SpO2 100%.  Intake/Output:   Last 3 shifts: I/O last 3 completed shifts:  In: -   Out: 250 [Urine:250]   This shift: No intake/output data recorded.     Vent Settings:      Hemodynamic parameters (last 24 hours):      Scheduled Meds:   Current Facility-Administered Medications   Medication Dose Route Frequency    meropenem (Merrem) 500 mg in sodium chloride 0.9% 100 mL IVPB-MBP  500 mg Intravenous q12h    vancomycin (Vancocin) 750 mg in sodium chloride 0.9% 250 mL IVPB-ADDV  15 mg/kg Intravenous Once    Vancomycin: PHARMACY DOSING  1 each Intravenous See Admin Instructions (RX holding)    vancomycin (Vancocin) cap 250 mg  250 mg Oral QID    HYDROcodone-acetaminophen (Norco)  MG per tab 1 tablet  1 tablet Oral Q4H PRN    levETIRAcetam (Keppra) 500 mg/5mL injection 500 mg  500 mg Intravenous Daily    dextrose 5%-sodium chloride 0.9% infusion   Intravenous Continuous    heparin (Porcine) 5000 UNIT/ML injection 5,000 Units  5,000 Units Subcutaneous Q8H BROOKLYNN    HYDROmorphone (Dilaudid) 1 MG/ML injection 0.2 mg  0.2 mg Intravenous Q4H PRN    acetaminophen (Tylenol) 160 MG/5ML oral liquid 650 mg  650 mg Oral Q8H    senna-docusate (Senokot-S) 8.6-50 MG per tab 2 tablet  2 tablet Oral Daily PRN    polyethylene glycol (PEG 3350) (Miralax) 17 g oral packet 17 g  17 g Oral Daily PRN    patiromer (Veltassa) 8.4 g oral packet 8.4 g  8.4 g Oral Once    glucose (Dex4) 15 GM/59ML oral liquid 15 g  15 g Oral Q15 Min PRN    Or    glucose (Glutose)  40% oral gel 15 g  15 g Oral Q15 Min PRN    Or    glucose-vitamin C (Dex-4) chewable tab 4 tablet  4 tablet Oral Q15 Min PRN    Or    dextrose 50% injection 50 mL  50 mL Intravenous Q15 Min PRN    Or    glucose (Dex4) 15 GM/59ML oral liquid 30 g  30 g Oral Q15 Min PRN    Or    glucose (Glutose) 40% oral gel 30 g  30 g Oral Q15 Min PRN    Or    glucose-vitamin C (Dex-4) chewable tab 8 tablet  8 tablet Oral Q15 Min PRN    traMADol (Ultram) tab 50 mg  50 mg Oral Q12H PRN    sacubitril-valsartan (Entresto) 24-26 MG per tab 1 tablet  1 tablet Oral BID    bumetanide (Bumex) tab 1 mg  1 mg Oral Daily    metoprolol succinate ER (Toprol XL) 24 hr tab 25 mg  25 mg Oral Daily Beta Blocker    midodrine (ProAmatine) tab 5 mg  5 mg Oral TID    ondansetron (Zofran) 4 MG/2ML injection 4 mg  4 mg Intravenous Q6H PRN    metoclopramide (Reglan) 5 mg/mL injection 5 mg  5 mg Intravenous Q8H PRN    sodium hypochlorite (Dakin's) 0.125 % external solution   Topical BID    acetaminophen (Tylenol Extra Strength) tab 500 mg  500 mg Oral Q4H PRN    amiodarone (Pacerone) tab 200 mg  200 mg Oral Daily    fentaNYL (Duragesic) 12 MCG/HR patch 1 patch  1 patch Transdermal Q72H    ferrous sulfate DR tab 325 mg  325 mg Oral Daily with breakfast    folic acid (Folvite) tab 800 mcg  800 mcg Oral Daily    gabapentin (Neurontin) cap 300 mg  300 mg Oral TID    [Held by provider] isosorbide dinitrate (Isordil) tab 20 mg  20 mg Oral TID (Nitrates)    pantoprazole (Protonix) DR tab 40 mg  40 mg Oral BID AC       Continuous Infusions:    dextrose 5%-sodium chloride 0.9% 50 mL/hr at 08/22/24 1128       Physical Exam  Constitutional: Lethargic  Eyes: PERRL  ENT: nares pateint  Neck: supple, no JVD  Cardio: RRR, S1 S2  Respiratory: clear to auscultation bilaterally, no wheezing, rales, rhonchi, crackles  GI: abdomen soft, non tender, active bowel sounds, no organomegaly  Extremities: no clubbing, cyanosis, edema  Neurologic: no gross motor deficits  Skin: warm,  dry      Results:     Lab Results   Component Value Date    WBC 40.6 08/25/2024    HGB 9.1 08/25/2024    HCT 29.8 08/25/2024    .0 08/25/2024    CREATSERUM 1.90 08/25/2024    BUN 42 08/25/2024     08/25/2024    K 4.9 08/25/2024     08/25/2024    CO2 18.0 08/25/2024     08/25/2024    CA 9.0 08/25/2024    ALB 3.2 08/25/2024    ALKPHO 168 08/25/2024    BILT 0.4 08/25/2024    TP 6.6 08/25/2024    AST 12 08/25/2024    ALT <7 08/25/2024       No results found.          Assessment   1.  Sacral decubitus ulcer status post debridement  2.  Leukocytosis  3.  Toxic metabolic encephalopathy  4.  Hyponatremia  5.  Nonischemic cardiomyopathy  6.  History of paroxysmal atrial fibrillation  7.  Anemia  8.  Acute kidney injury on chronic kidney disease  9.  Fevers  10.  Staph aureus bacteremia     Plan   -Patient presented with evidence of worsening sacral wound status post debridement on 8/13/2024.  Cultures positive for E. coli.  Completed course of antibiotic therapy.  -Most recent blood cultures 1 of 2 positive on 8/22/2024 for Staph aureus.  Seen by ID concern for contaminant.  -Monitoring of IV antibiotic therapy at this time  -Hold off stress dose steroids at this time  -Monitor fever curve  -Wound care  -CT head with no acute findings seen  -Close neuromonitoring  -Closely monitor renal function and urine output  -DVT prophylaxis: Heparin  -Patient DNR/DNI  -Okay to transfer to floor    Augustin Parsons DO  Pulmonary Critical Care Medicine  [facility]

## 2024-08-25 NOTE — PROGRESS NOTES
[facility]     Margaret Silverio Patient Status:  Inpatient    3/14/1945 MRN [id number]   Location [facility] POST ANESTH[facility]ARE UNIT Attending Fernando Galan MD   Hosp Day # 13 PCP Johnathon Rodriguez DO     Subjective:     Pt was seen and examined.   Resting comfortably in bed, no overnight events reported by the nursing staff.       Objective:    Review of Systems:   ROS completed; pertinent positive and negatives stated in subjective.      Vital signs:  Temp:  [96.5 °F (35.8 °C)-97.2 °F (36.2 °C)] 97.2 °F (36.2 °C)  Pulse:  [60-62] 60  Resp:  [13-23] 15  BP: (103-132)/(54-74) 121/74  SpO2:  [95 %-100 %] 95 %      Physical Exam:    Gen: alert, oriented x1  Chest: good air entry CTABL  CVS: normal s1 and s2 RR  Abd: NABS soft NT ND  Neuro: non-focal, following commands  Skin: decub dressing CDI  Ext: no edema in bilateral LE      Diagnostic Data:    Labs:  Recent Labs   Lab 24  0626 24  0555 24  0643 24  0426 24  0423 24  0435   WBC 8.3 8.5 11.9* 15.1* 25.5* 40.6*   HGB 8.1* 9.5* 8.4* 8.9* 8.2* 9.1*   MCV 95.2 97.8 93.8 97.7 97.5 96.1   .0* 671.0* 658.0* 565.0* 543.0* 436.0   BAND 1 2 4 5 2  --        Recent Labs   Lab 24  0426 24  0423 24  0730   GLU 80 87 135*   BUN 42* 50* 42*   CREATSERUM 1.58* 2.01* 1.90*   CA 9.1 9.1 9.0   ALB 3.2 3.2 3.2   * 135* 135*   K 4.4 4.7 4.9    105 107   CO2 21.0 21.0 18.0*   ALKPHO 154* 160* 168*   AST 15 14 12   ALT <7* <7* <7*   BILT 0.5 0.5 0.4   TP 6.6 6.5 6.6       Estimated Creatinine Clearance: 21 mL/min (A) (based on SCr of 1.9 mg/dL (H)).    No results for input(s): \"PTP\", \"INR\" in the last 168 hours.           Imaging: Imaging data reviewed in Epic.    Medications:    meropenem  500 mg Intravenous q12h    vancomycin  15 mg/kg Intravenous Once    Vancomycin IV  1 each Intravenous See Admin Instructions (RX  holding)    vancomycin  250 mg Oral QID    levETIRAcetam  500 mg Intravenous Daily    heparin  5,000 Units Subcutaneous Q8H BROOKLYNN    acetaminophen  650 mg Oral Q8H    patiromer  8.4 g Oral Once    sacubitril-valsartan  1 tablet Oral BID    bumetanide  1 mg Oral Daily    metoprolol succinate  25 mg Oral Daily Beta Blocker    midodrine  5 mg Oral TID    sodium hypochlorite   Topical BID    amiodarone  200 mg Oral Daily    fentaNYL  1 patch Transdermal Q72H    ferrous sulfate  325 mg Oral Daily with breakfast    folic acid  800 mcg Oral Daily    gabapentin  300 mg Oral TID    [Held by provider] isosorbide dinitrate  20 mg Oral TID (Nitrates)    pantoprazole  40 mg Oral BID AC       Assessment & Plan:     GPC bacteremia  Leukocytosis  Sacral decubitus ulcer  Gsx on consult  Now s/p Excision of cyst, sharp excisional debridement of buttock decubitus ulcer POD #11  PRN pain control  Completed 10 days of abx  WBC 8.5 ->11.9 -> 15 -> 25.5  No fever curve, no obv source noted  Infectious workup negative so far  UA and repeat Bcx pending  ID on consult  Hold off of abx at this time  Repeat Bcx, follow previous cultures  Received Solumedrol IV x1  ICU on consult   Hold off stress dose steroids at this time  Close neuromonitoring  AMS -> intermittent   Unclear etiology, consider metabolic encephalopathy  Neurology following, EEG WNL  Keppra resumed  CT head non-acute  ABG reviewed  ICU on consult   Hold off stress dose steroids at this time  Close neuromonitoring  Fall precautions  Hyponatremia  Na 129 -> 133 -> 135  Continue D5NS  Monitor labs  UTI  UA reviewed  Ucx pending -> e. coli  Continue IV abx - completed course of treatment  NICM, HFrEF  Hx of paroxysmal afib  Hx of BRANDYN occlusion  AV paced  Continue home meds  GEORGE on CKD 3 - stable  Monitor renal function  Avoid nephrotoxic agents  Anemia  Hgb stable today  Cont to monitor   Thrombocytosis  Monitor labs  Heparin s/c for DVT ppx  Nutrition  Dietitian on  consult  Encouraged to eat more  Family reports the patient would not want a feeding tube  GOC  Palliative care following. Patient and family okay with CPC  Appreciate CM/SW placement    Plan of care discussed with RN at bedside       Goals of care: Palliative care has been consulted, discussions on going regarding DC plan, daughter will discuss with family before making a decision on Hospice care. Otherwise will dc with home palliative care.     Supplementary Documentation:     Quality:  DVT Prophylaxis: Heparin s/c  CODE status: DNAR/Select      Estimated date of discharge: TBD  Discharge is dependent on: clinical stability  At this point Ms. Silverio is expected to be discharge to: TBD    MDM: High

## 2024-08-26 ENCOUNTER — APPOINTMENT (OUTPATIENT)
Dept: CT IMAGING | Facility: HOSPITAL | Age: 79
DRG: 853 | End: 2024-08-26
Attending: PHYSICIAN ASSISTANT
Payer: MEDICARE

## 2024-08-26 LAB
ALBUMIN SERPL-MCNC: 3.1 G/DL (ref 3.2–4.8)
ALBUMIN/GLOB SERPL: 0.9 {RATIO} (ref 1–2)
ALP LIVER SERPL-CCNC: 161 U/L
ALT SERPL-CCNC: <7 U/L
ANION GAP SERPL CALC-SCNC: 8 MMOL/L (ref 0–18)
AST SERPL-CCNC: 13 U/L (ref ?–34)
BASOPHILS # BLD: 0 X10(3) UL (ref 0–0.2)
BASOPHILS # BLD: 0 X10(3) UL (ref 0–0.2)
BASOPHILS NFR BLD: 0 %
BASOPHILS NFR BLD: 0 %
BILIRUB SERPL-MCNC: 0.3 MG/DL (ref 0.2–1.1)
BUN BLD-MCNC: 52 MG/DL (ref 9–23)
BUN/CREAT SERPL: 29.1 (ref 10–20)
CALCIUM BLD-MCNC: 8.6 MG/DL (ref 8.7–10.4)
CHLORIDE SERPL-SCNC: 112 MMOL/L (ref 98–112)
CO2 SERPL-SCNC: 19 MMOL/L (ref 21–32)
CREAT BLD-MCNC: 1.79 MG/DL
DEPRECATED RDW RBC AUTO: 66.3 FL (ref 35.1–46.3)
DEPRECATED RDW RBC AUTO: 66.4 FL (ref 35.1–46.3)
EGFRCR SERPLBLD CKD-EPI 2021: 29 ML/MIN/1.73M2 (ref 60–?)
EOSINOPHIL # BLD: 0 X10(3) UL (ref 0–0.7)
EOSINOPHIL # BLD: 0 X10(3) UL (ref 0–0.7)
EOSINOPHIL NFR BLD: 0 %
EOSINOPHIL NFR BLD: 0 %
ERYTHROCYTE [DISTWIDTH] IN BLOOD BY AUTOMATED COUNT: 19.6 % (ref 11–15)
ERYTHROCYTE [DISTWIDTH] IN BLOOD BY AUTOMATED COUNT: 19.6 % (ref 11–15)
GLOBULIN PLAS-MCNC: 3.3 G/DL (ref 2–3.5)
GLUCOSE BLD-MCNC: 112 MG/DL (ref 70–99)
GLUCOSE BLDC GLUCOMTR-MCNC: 121 MG/DL (ref 70–99)
GLUCOSE BLDC GLUCOMTR-MCNC: 71 MG/DL (ref 70–99)
GLUCOSE BLDC GLUCOMTR-MCNC: 83 MG/DL (ref 70–99)
GLUCOSE BLDC GLUCOMTR-MCNC: 88 MG/DL (ref 70–99)
HCT VFR BLD AUTO: 25.1 %
HCT VFR BLD AUTO: 29.8 %
HGB BLD-MCNC: 7.9 G/DL
HGB BLD-MCNC: 9.1 G/DL
LYMPHOCYTES NFR BLD: 0 %
LYMPHOCYTES NFR BLD: 0 X10(3) UL (ref 1–4)
LYMPHOCYTES NFR BLD: 0.81 X10(3) UL (ref 1–4)
LYMPHOCYTES NFR BLD: 2 %
MCH RBC QN AUTO: 29.4 PG (ref 26–34)
MCH RBC QN AUTO: 29.9 PG (ref 26–34)
MCHC RBC AUTO-ENTMCNC: 30.5 G/DL (ref 31–37)
MCHC RBC AUTO-ENTMCNC: 31.5 G/DL (ref 31–37)
MCV RBC AUTO: 95.1 FL
MCV RBC AUTO: 96.1 FL
METAMYELOCYTES # BLD: 0.41 X10(3) UL
METAMYELOCYTES # BLD: 1.44 X10(3) UL
METAMYELOCYTES NFR BLD: 1 %
METAMYELOCYTES NFR BLD: 3 %
MONOCYTES # BLD: 0.81 X10(3) UL (ref 0.1–1)
MONOCYTES # BLD: 1.92 X10(3) UL (ref 0.1–1)
MONOCYTES NFR BLD: 2 %
MONOCYTES NFR BLD: 4 %
NEUTROPHILS # BLD AUTO: 35.23 X10 (3) UL (ref 1.5–7.7)
NEUTROPHILS # BLD AUTO: 42.04 X10 (3) UL (ref 1.5–7.7)
NEUTROPHILS NFR BLD: 88 %
NEUTROPHILS NFR BLD: 90 %
NEUTS BAND NFR BLD: 3 %
NEUTS BAND NFR BLD: 5 %
NEUTS HYPERSEG # BLD: 38.57 X10(3) UL (ref 1.5–7.7)
NEUTS HYPERSEG # BLD: 43.77 X10(3) UL (ref 1.5–7.7)
OSMOLALITY SERPL CALC.SUM OF ELEC: 303 MOSM/KG (ref 275–295)
PLATELET # BLD AUTO: 436 10(3)UL (ref 150–450)
PLATELET # BLD AUTO: 438 10(3)UL (ref 150–450)
PLATELET MORPHOLOGY: NORMAL
PLATELET MORPHOLOGY: NORMAL
POTASSIUM SERPL-SCNC: 4.3 MMOL/L (ref 3.5–5.1)
PROMYELOCYTES # BLD: 0.96 X10(3) UL
PROMYELOCYTES NFR BLD: 2 %
PROT SERPL-MCNC: 6.4 G/DL (ref 5.7–8.2)
RBC # BLD AUTO: 2.64 X10(6)UL
RBC # BLD AUTO: 3.1 X10(6)UL
SODIUM SERPL-SCNC: 139 MMOL/L (ref 136–145)
TOTAL CELLS COUNTED BLD: 100
TOTAL CELLS COUNTED BLD: 100
TOXIC GRANULES BLD QL SMEAR: PRESENT
WBC # BLD AUTO: 40.6 X10(3) UL (ref 4–11)
WBC # BLD AUTO: 48.1 X10(3) UL (ref 4–11)

## 2024-08-26 PROCEDURE — 71250 CT THORAX DX C-: CPT | Performed by: PHYSICIAN ASSISTANT

## 2024-08-26 PROCEDURE — 99231 SBSQ HOSP IP/OBS SF/LOW 25: CPT | Performed by: NURSE PRACTITIONER

## 2024-08-26 PROCEDURE — 99233 SBSQ HOSP IP/OBS HIGH 50: CPT | Performed by: INTERNAL MEDICINE

## 2024-08-26 PROCEDURE — 74176 CT ABD & PELVIS W/O CONTRAST: CPT | Performed by: PHYSICIAN ASSISTANT

## 2024-08-26 RX ORDER — VANCOMYCIN HYDROCHLORIDE 125 MG/1
125 CAPSULE ORAL EVERY 12 HOURS
Status: DISCONTINUED | OUTPATIENT
Start: 2024-08-27 | End: 2024-09-10

## 2024-08-26 NOTE — PALLIATIVE CARE NOTE
[facility]  part of [facility]  Palliative Care Progress Note    Margaret Silverio Patient Status:  Inpatient    3/14/1945 MRN [id number]   Location [facility] 4W/SW/SE Attending Fernando Galan MD   Hosp Day # 14 PCP Johnathon Rodriguez,      The  Cures Act makes medical notes like these available to patients in the interest of transparency. Please be advised this is a medical document. Medical documents are intended to carry relevant information, facts as evident, and the clinical opinion of the practitioner. The medical note is intended as peer to peer communication and may appear blunt or direct. It is written in medical language and may contain abbreviations or verbiage that are unfamiliar.     Subjective     When I entered the room, the patient was in the bed, she is awake and alert. Talkative today. Margaret is stating she wants to go home and feels like she is declining here at the hospital. She states she continues to experience pain in sacral area but is ok.        Review of pertinent medication requirements in past 24 hours:  Norco 10/325 X4, Dilaudid 0.2mgX1    Palliative Care symptom needs assessed:     Co/ pain in buttock where decubiti is present  Appetite waxes and wanes.   Denies constipation    Allergies:  Allergies   Allergen Reactions    Lisinopril Coughing       Medications:     Current Facility-Administered Medications:     meropenem (Merrem) 500 mg in sodium chloride 0.9% 100 mL IVPB-MBP, 500 mg, Intravenous, q12h    Vancomycin: PHARMACY DOSING, 1 each, Intravenous, See Admin Instructions (RX holding)    vancomycin (Vancocin) cap 250 mg, 250 mg, Oral, QID    HYDROcodone-acetaminophen (Norco)  MG per tab 1 tablet, 1 tablet, Oral, Q4H PRN    levETIRAcetam (Keppra) 500 mg/5mL injection 500 mg, 500 mg, Intravenous, Daily    heparin (Porcine) 5000 UNIT/ML injection 5,000 Units, 5,000 Units, Subcutaneous, Q8H BROOKLYNN    HYDROmorphone (Dilaudid) 1  MG/ML injection 0.2 mg, 0.2 mg, Intravenous, Q4H PRN **OR** [DISCONTINUED] HYDROmorphone (Dilaudid) 1 MG/ML injection 0.4 mg, 0.4 mg, Intravenous, Q3H PRN **OR** [DISCONTINUED] HYDROmorphone (Dilaudid) 1 MG/ML injection 0.8 mg, 0.8 mg, Intravenous, Q3H PRN    acetaminophen (Tylenol) 160 MG/5ML oral liquid 650 mg, 650 mg, Oral, Q8H    senna-docusate (Senokot-S) 8.6-50 MG per tab 2 tablet, 2 tablet, Oral, Daily PRN    polyethylene glycol (PEG 3350) (Miralax) 17 g oral packet 17 g, 17 g, Oral, Daily PRN    patiromer (Veltassa) 8.4 g oral packet 8.4 g, 8.4 g, Oral, Once    glucose (Dex4) 15 GM/59ML oral liquid 15 g, 15 g, Oral, Q15 Min PRN **OR** glucose (Glutose) 40% oral gel 15 g, 15 g, Oral, Q15 Min PRN **OR** glucose-vitamin C (Dex-4) chewable tab 4 tablet, 4 tablet, Oral, Q15 Min PRN **OR** dextrose 50% injection 50 mL, 50 mL, Intravenous, Q15 Min PRN **OR** glucose (Dex4) 15 GM/59ML oral liquid 30 g, 30 g, Oral, Q15 Min PRN **OR** glucose (Glutose) 40% oral gel 30 g, 30 g, Oral, Q15 Min PRN **OR** glucose-vitamin C (Dex-4) chewable tab 8 tablet, 8 tablet, Oral, Q15 Min PRN    traMADol (Ultram) tab 50 mg, 50 mg, Oral, Q12H PRN    sacubitril-valsartan (Entresto) 24-26 MG per tab 1 tablet, 1 tablet, Oral, BID    bumetanide (Bumex) tab 1 mg, 1 mg, Oral, Daily    metoprolol succinate ER (Toprol XL) 24 hr tab 25 mg, 25 mg, Oral, Daily Beta Blocker    midodrine (ProAmatine) tab 5 mg, 5 mg, Oral, TID    ondansetron (Zofran) 4 MG/2ML injection 4 mg, 4 mg, Intravenous, Q6H PRN    metoclopramide (Reglan) 5 mg/mL injection 5 mg, 5 mg, Intravenous, Q8H PRN    sodium hypochlorite (Dakin's) 0.125 % external solution, , Topical, BID    acetaminophen (Tylenol Extra Strength) tab 500 mg, 500 mg, Oral, Q4H PRN    amiodarone (Pacerone) tab 200 mg, 200 mg, Oral, Daily    fentaNYL (Duragesic) 12 MCG/HR patch 1 patch, 1 patch, Transdermal, Q72H    ferrous sulfate DR tab 325 mg, 325 mg, Oral, Daily with breakfast    folic acid (Folvite)  tab 800 mcg, 800 mcg, Oral, Daily    gabapentin (Neurontin) cap 300 mg, 300 mg, Oral, TID    [Held by provider] isosorbide dinitrate (Isordil) tab 20 mg, 20 mg, Oral, TID (Nitrates)    pantoprazole (Protonix) DR tab 40 mg, 40 mg, Oral, BID AC    Objective     Vital Signs:  Blood pressure 128/85, pulse 60, temperature 97.6 °F (36.4 °C), temperature source Temporal, resp. rate 17, weight 120 lb (54.4 kg), SpO2 93%.  Body mass index is 20.6 kg/m².  Present Level of pain: states pain is controlled.   Non-verbal signs of pain present: No    Physical Exam:  General: Margaret is in the bed she is awake and alert, talkative is very weak, she appears elderly, cachetic and frail.   HEENT: dry oral MM  Cardiac: + murmer regular rate and rhythm, S1, S2 normal, no rub or gallop.  Lungs: clear without wheezes.  Normal excursions and effort. On RA  Abdomen: Soft, flat non-tender, + bowel sounds, no rebound or guarding, + BM  Extremities: Without clubbing, cyanosis. BLE Edema not present  Neurologic: more awake today asking about going home.  follows simple commands, flat affect  Skin: Warm and dry.  + sacral wound see wound care note     Prior to admission Palliative performance scale PPSv2 (%): 30    Hematology:  Lab Results   Component Value Date    WBC 48.1 (H) 08/26/2024    HGB 7.9 (L) 08/26/2024    HCT 25.1 (L) 08/26/2024    .0 08/26/2024       Coags:  Lab Results   Component Value Date    INR 1.25 (H) 02/09/2024    PTT 35.2 02/09/2024       Chemistry:  Lab Results   Component Value Date    CREATSERUM 1.79 (H) 08/26/2024    BUN 52 (H) 08/26/2024     08/26/2024    K 4.3 08/26/2024     08/26/2024    CO2 19.0 (L) 08/26/2024     (H) 08/26/2024    CA 8.6 (L) 08/26/2024    ALB 3.1 (L) 08/26/2024    ALKPHO 161 (H) 08/26/2024    BILT 0.3 08/26/2024    TP 6.4 08/26/2024    AST 13 08/26/2024    ALT <7 (L) 08/26/2024    MG 2.1 08/21/2024    PHOS 2.9 08/21/2024       Imaging:  No results found.      Summary of  Discussion    8/26/24 followed up with pt today, she is asking about going home, she is indicating interest in comfort measures at home. Margaret misses her  and kids and feels like she is declining and not getting better while here at the hospital. Discussed elevated WBC's CT scan today with Margaret.   I called dtr Barbi and dicussed her mothers wishes to return to home. I discussed meeting with hospice team for informational session.   Barbi wants to speak with her father first and see what CT scan results will be. She is interested in hospice informational session however will wait until I follow up tomorrow to discuss further.     8/23/24 followed up with pt today, she is awake and alert. RN is feeding her breakfast cereal. Pt c/o pain in sacral decubiti with scrunched forehead. Discussed with RN use of Norco and holding for any drowsiness. Will have PT/OT try to work with pt eval and treat. Will need to continue to see how she progresses. Ongoing GOC will be needed pending her progress.     8/21/24 followed up with Margaret today she is more awake on exam, her dtr Barbi is present at the bedside, Margaret has been refusing to eat, she states the food does not taste good. Barbi stated her father brought in some soup from home a day ago and she ate the soup. Discussed bringing in food from home that she likes to eat, discussed with Margaret need for nutritional protein to help with wound healing and general healing.   I explored with Margaret if she is feeling depressed she denied depression. Discussed use of Remeron to help with appetite, there is concern for increased drowsiness with the Remeron, discussed use of Cymbalta, pt declines antidepressant at this time.   I explored with Margaret if she wanted feeding tube she stated no she would not want feeding tube.   Barbi stated she asked her mother if she wanted to die as she is not taking her medications nor eating well.  Margaret stated she is not  ready to die.   I discussed use of the Norco as needed  as pt expressed not wanting to be too sleepy however she continues to experience a lot pain in her buttock area. She has not taken anything for pain since last night and has been refusing to take the scheduled tylenol. We discussed this and I asked the RN to provide dose of Norco. I discussed with pt and dtr to call if needed I will follow up on Friday as I am off tomorrow. One of my coworkers will follow up tomorrow if needed.     8/20/24 Followed up with Margaret today, she is not responsive for me on exam, tries to open her eyes when asked. vital signs stable, she did receive 2 doses of the 0.4mg Dilaudid over the last 24 hours. I spoke with her dtr Barbi expressed concern over managing her pain and trying to keep her awake and alert to eat and participate in care.   Barbi stated her mother experienced this last week where she was not responsive.   Rn reported pt was crying overnight due to pain  Will continue with the Fentanyl patch as that is not new. Decrease the Dilaudid to 0.2mg every 4 hours and will monitor. discussed with dtr Barbi.       Assessment and Recommendation      Pressure injury of skin of sacral region, unspecified injury stage    [id number] following.    S/P I&D 8/13/24    Pancytopenia    Non ischemic Cardiomyopathy    SSS PPM/AICD    Afib    Urinary tract infection without hematuria,    Anemia    Encephalopathy    CKD    H/o GIB    Frequent hospitalizations    H/o hypotension     Pain-concern of increased drowsiness will monitor pt. -pt has been refusing to take medications, I discussed this with her.   -continue with scheduled Tylenol 650mg every 8 hours-pt has been refusing   -Continue with Fentanyl patch 12mcg-pt refused last dose.   -stopped the morphine due to kidney function  -Dilaudid 0.2mg every 4 hours as needed for more severe pain  -continue with gabapentin 300mg TID  -stop the Flexeril nightly  -Norco 10/325  as needed every 4 hours-hold for any drowsiness.      Poor appetite  -offered Remeron daily for depression and appetite, pt declines this at this time.   Offered Cymbalta she declined      Bowel regimen  -add senna daily as needed  -Miralax daily as needed     Goals of care counseling  -see above for details  -Pt is DNAR/DNI with selective treatment  -Agreeable to palliative care following  -Dispo: TBD. Dtr is agreeable to CPC. SW to help with dc planning.   - not requested.   -Margaret stated she would not want feeding tube.   -ongoing GOC discussions will be needed over time.  -pts dtr Barbi will talk with family about getting hospice informational session while here.   -Provided emotional support to pt/family who are coping adequately     Advance care planning  -see above for details  -Pt's dtr Barbi Silverio is HCPOA [phone]  -HCPOA and POLST pw are on file in [employer].      Palliative Performance Scale 30%     Discussed today's visit with Dr Zhang and Tierney GIL    Palliative Care Follow Up: Palliative care team will continue to follow. Feel free to contact our team with any questions or concerns.    Thank you for allowing Palliative Care services to participate in the care of Margaret Silverio.    A total of 25 minutes were spent on this follow-up, which included all of the following: chart review, direct face to face contact, history taking, physical examination, counseling and coordinating care, and documentation.     Macy Marrufo, [id number]  8/26/2024  12:40PM  Palliative Care Services

## 2024-08-26 NOTE — PHYSICAL THERAPY NOTE
Chart reviewed for possible therapy follow up.   Pt currently at CT scan ...   Will follow up later in day as schedule allows.      Imaging reviewed .   Noted in chart review palliative care setting up meeting with family to discuss possible Hospice tomorrow.   Therapy contacting RN discussed pt status with RN---  therapy plan will be for follow up tomorrow pending goals of care discussion/ possible transition to hospice .

## 2024-08-26 NOTE — PROGRESS NOTES
[facility]  part of [facility]ist Progress Note     Margaret Silverio Patient Status:  Inpatient    3/14/1945 MRN [id number]   Location [facility] POST ANESTHESIA CARE UNIT Attending Fernando Galan MD   Hosp Day # 14 PCP Johnathon Rodriguez DO     Subjective:     Pt was seen and examined.   Resting comfortably in bed, no overnight events reported by the nursing staff.   Having a conversation. Son present at the bedside.  All questions and concerns addressed.       Objective:    Review of Systems:   ROS completed; pertinent positive and negatives stated in subjective.      Vital signs:  Temp:  [96.9 °F (36.1 °C)-97.5 °F (36.4 °C)] 97.4 °F (36.3 °C)  Pulse:  [59-61] 60  Resp:  [11-26] 17  BP: ()/(49-92) 133/92  SpO2:  [77 %-100 %] 95 %      Physical Exam:    Gen: alert, oriented x2-3  Chest: good air entry CTABL  CVS: normal s1 and s2 RR  Abd: NABS soft NT ND  Neuro: non-focal, following commands  Skin: decub dressing CDI  Ext: no edema in bilateral LE      Diagnostic Data:    Labs:  Recent Labs   Lab 24  0643 24  0426 24  0423 24  0435 24  0407   WBC 11.9* 15.1* 25.5* 40.6* 48.1*   HGB 8.4* 8.9* 8.2* 9.1* 7.9*   MCV 93.8 97.7 97.5 96.1 95.1   .0* 565.0* 543.0* 436.0 438.0   BAND 4 5 2 5 3       Recent Labs   Lab 24  0423 24  0730 24  0324   GLU 87 135* 112*   BUN 50* 42* 52*   CREATSERUM 2.01* 1.90* 1.79*   CA 9.1 9.0 8.6*   ALB 3.2 3.2 3.1*   * 135* 139   K 4.7 4.9 4.3    107 112   CO2 21.0 18.0* 19.0*   ALKPHO 160* 168* 161*   AST 14 12 13   ALT <7* <7* <7*   BILT 0.5 0.4 0.3   TP 6.5 6.6 6.4       Estimated Creatinine Clearance: 22.2 mL/min (A) (based on SCr of 1.79 mg/dL (H)).    No results for input(s): \"PTP\", \"INR\" in the last 168 hours.           Imaging: Imaging data reviewed in Epic.    Medications:    meropenem  500 mg Intravenous q12h    Vancomycin IV  1 each Intravenous See Admin  Instructions (RX holding)    vancomycin  250 mg Oral QID    levETIRAcetam  500 mg Intravenous Daily    heparin  5,000 Units Subcutaneous Q8H BROOKLYNN    acetaminophen  650 mg Oral Q8H    patiromer  8.4 g Oral Once    sacubitril-valsartan  1 tablet Oral BID    bumetanide  1 mg Oral Daily    metoprolol succinate  25 mg Oral Daily Beta Blocker    midodrine  5 mg Oral TID    sodium hypochlorite   Topical BID    amiodarone  200 mg Oral Daily    fentaNYL  1 patch Transdermal Q72H    ferrous sulfate  325 mg Oral Daily with breakfast    folic acid  800 mcg Oral Daily    gabapentin  300 mg Oral TID    [Held by provider] isosorbide dinitrate  20 mg Oral TID (Nitrates)    pantoprazole  40 mg Oral BID AC       Assessment & Plan:     GPC bacteremia  Worsening Leukocytosis  Sacral decubitus ulcer  Gsx on consult  Now s/p Excision of cyst, sharp excisional debridement of buttock decubitus ulcer  PRN pain control  Completed 10 days of abx  WBC 8.5 ->11.9 -> 15 -> 25.5  No fever curve, no obv source noted  Infectious workup negative so far  UA and repeat Bcx pending  ID on consult  Repeat Bcx, follow previous cultures  Received Solumedrol IV x1  Remains on abx at this time  ICU on consult   Hold off stress dose steroids at this time  Close neuromonitoring  Currently on PO Vanc, IV Meropenem  AMS -> intermittent -> much improved  Unclear etiology, consider metabolic encephalopathy  Neurology following, EEG WNL  Keppra resumed  CT head non-acute  ABG reviewed  ICU on consult   Hold off stress dose steroids at this time  Close neuromonitoring  Fall precautions  Hyponatremia  Na 129 -> 133 -> 135 - 139  Continue D5NS  Monitor labs  UTI  UA reviewed  Ucx pending -> e. coli  Continue IV abx - completed course of treatment  NICM, HFrEF  Hx of paroxysmal afib  Hx of BRANDYN occlusion  AV paced  Continue home meds  GEORGE on CKD 3 - stable  Monitor renal function  Avoid nephrotoxic agents  Anemia  Hgb stable today  Cont to monitor    Thrombocytosis  Monitor labs  Heparin s/c for DVT ppx  Nutrition  Dietitian on consult  Family reports the patient would not want a feeding tube  IVF discontinued, encourage to eat more  GOC  Palliative care following. Patient and family okay with CPC  Appreciate CM/SW placement    Plan of care discussed with RN at bedside       Goals of care: Palliative care has been consulted, discussions on going regarding DC plan, daughter will discuss with family before making a decision on Hospice care. Otherwise will dc with home palliative care.     Supplementary Documentation:     Quality:  DVT Prophylaxis: Heparin s/c  CODE status: DNAR/Select      Estimated date of discharge: TBD  Discharge is dependent on: clinical stability  At this point Ms. Silverio is expected to be discharge to: TBD    MDM: High

## 2024-08-26 NOTE — PLAN OF CARE
Problem: Patient Centered Care  Goal: Patient preferences are identified and integrated in the patient's plan of care  Description: Interventions:  - What would you like us to know as we care for you?   - Provide timely, complete, and accurate information to patient/family  - Incorporate patient and family knowledge, values, beliefs, and cultural backgrounds into the planning and delivery of care  - Encourage patient/family to participate in care and decision-making at the level they choose  - Honor patient and family perspectives and choices  Outcome: Progressing     Problem: Patient/Family Goals  Goal: Patient/Family Long Term Goal  Description: Patient's Long Term Goal:     Interventions:  -   - See additional Care Plan goals for specific interventions  Outcome: Progressing  Goal: Patient/Family Short Term Goal  Description: Patient's Short Term Goal:     Interventions:   -   - See additional Care Plan goals for specific interventions  Outcome: Progressing     Problem: PAIN - ADULT  Goal: Verbalizes/displays adequate comfort level or patient's stated pain goal  Description: INTERVENTIONS:  - Encourage pt to monitor pain and request assistance  - Assess pain using appropriate pain scale  - Administer analgesics based on type and severity of pain and evaluate response  - Implement non-pharmacological measures as appropriate and evaluate response  - Consider cultural and social influences on pain and pain management  - Manage/alleviate anxiety  - Utilize distraction and/or relaxation techniques  - Monitor for opioid side effects  - Notify MD/LIP if interventions unsuccessful or patient reports new pain  - Anticipate increased pain with activity and pre-medicate as appropriate  Outcome: Progressing     Problem: RISK FOR INFECTION - ADULT  Goal: Absence of fever/infection during anticipated neutropenic period  Description: INTERVENTIONS  - Monitor WBC  - Administer growth factors as ordered  - Implement neutropenic  guidelines  Outcome: Progressing     Problem: SAFETY ADULT - FALL  Goal: Free from fall injury  Description: INTERVENTIONS:  - Assess pt frequently for physical needs  - Identify cognitive and physical deficits and behaviors that affect risk of falls.  - [place] fall precautions as indicated by assessment.  - Educate pt/family on patient safety including physical limitations  - Instruct pt to call for assistance with activity based on assessment  - Modify environment to reduce risk of injury  - Provide assistive devices as appropriate  - Consider OT/PT consult to assist with strengthening/mobility  - Encourage toileting schedule  Outcome: Progressing     Problem: SKIN/TISSUE INTEGRITY - ADULT  Goal: Skin integrity remains intact  Description: INTERVENTIONS  - Assess and document risk factors for pressure ulcer development  - Assess and document skin integrity  - Monitor for areas of redness and/or skin breakdown  - Initiate interventions, skin care algorithm/standards of care as needed  Outcome: Progressing  Goal: Incision(s), wounds(s) or drain site(s) healing without S/S of infection  Description: INTERVENTIONS:  - Assess and document risk factors for pressure ulcer development  - Assess and document skin integrity  - Assess and document dressing/incision, wound bed, drain sites and surrounding tissue  - Implement wound care per orders  - Initiate isolation precautions as appropriate  - Initiate Pressure Ulcer prevention bundle as indicated  Outcome: Progressing  Goal: Oral mucous membranes remain intact  Description: INTERVENTIONS  - Assess oral mucosa and hygiene practices  - Implement preventative oral hygiene regimen  - Implement oral medicated treatments as ordered  Outcome: Progressing     Problem: Delirium  Goal: Minimize duration of delirium  Description: Interventions:  - Encourage use of hearing aids, eye glasses  - Promote highest level of mobility daily  - Provide frequent reorientation  - Promote  wakefulness i.e. lights on, blinds open  - Promote sleep, encourage patient's normal rest cycle i.e. lights off, TV off, minimize noise and interruptions  - Encourage family to assist in orientation and promotion of home routines  Outcome: Progressing

## 2024-08-26 NOTE — CDS QUERY
Dear Dr. Zhang    Please clarify the status of malnutrition.    (X)  Severe Protein-Calorie Malnutrition      ( )  Other (please specify)____________  A Registered dietician evaluated this patient and found to meet Severe Malnutrition Criteria using the [place] guidelines based on the following Clinical Indicators:  BMI 20.6  Criteria for severe malnutrition diagnosis: acute illness/injury related to wt loss greater than 7.5% in 3 months, energy intake less than 50% for greater than 5 days, body fat moderate depletion, and muscle mass moderate depletion.   Nutrition Focused Physical Exam (NFPE): moderate depletion body fat triceps region and moderate depletion muscle mass clavicle region unable to evaluate lower extremities at this time - largely bed-bound but will get up and move to a chair for part of the day most days   Skin Integrity: Pressure Injury: Stage 2 and shear on left buttock and unstageable on right buttock, at risk, and surgical wound   ANTHROPOMETRICS:  HT:  162.6 cm (5' 4\")  WT: 54.4 kg (120 lb) --no current wt. No need to get updated wt at this time.    BMI: Body mass index is 20.6 kg/m².  BMI CLASSIFICATION: <22 considered underweight for advanced age  IBW: 120 lbs        100% IBW  Usual Body Wt: 142 lbs per pt but unable to determine timeframe      85% UBW  Treatment: RD Malnutrition Care Plan: Encouraged increased PO intake, Encouraged small frequent meals with emphasis on high calorie/high protein, and Initiated ONS (oral nutritional supplements)   Monitor: adequacy of PO intake and adequacy of supplement intake    Anthropometric Measurement:    Monitor weight  - Nutrition Goals:      halt wt loss, PO and supplement greater than 75% of needs, good supplement intake, labs within acceptable limits, euglycemia, support wound healing, monitor fluid status, and improved GI status     DIETITIAN FOLLOW UP: RD to follow and monitor nutrition status  Risk factors: CHF, CKD, sacral  pressure ulcer s/p  debridement, bedbound  If you have any questions, please contact Clinical :  ELIU Adrian  at [phone]     Thank You!    THIS FORM IS A PERMANENT PART OF THE MEDICAL RECORD

## 2024-08-26 NOTE — PROGRESS NOTES
[facility] ID PROGRESS NOTE    Margaret Silverio Patient Status:  Inpatient    3/14/1945 MRN [id number]   Location [facility] 2W/SW Attending Veto Zhang MD   Hosp Day # 14 PCP Johnathon Rodriguez, DO     SUBJECTIVE  ROS done. Complains of sacral wound pain. Afebrile.    ASSESSMENT:     Antibiotics: Vancomycin, meropenem;  (IV ceftriaxone -)     # Fever-better  # Staph epidermidis bacteremia in 1/2 sets on 24 - likely contaminant               - has bioprosthetic MVR and L chest PPM   -TTE without vegetation     # Leukocytosis - suspect reactive; r/o bacteremia; repeat cx NGTD  -Also got 1 dose of solumedrol  # GEORGE on CKD  # Anemia s/p transfusion      # Sacral wound stage 2 s/p OR debridement 24 - cx E.coli, anaerobes - currently no signs of infection     # Asymptomatic bacteruria - E. coli  # Intermittent AMS    # RA, bedbound, previously on MTX and prednisone               - received prednisone at last discharge; currently off related to limited PO     PLAN:  -  Continue on vancomycin and meropenem. Check CT C/A/P.   -  Follow fever curve, wbc.   -  Reviewed labs, micro, imaging reports.  -  Case d/w patient, RN.     History of Present Illness:  79-year-old female with a history of severe nonischemic cardiomyopathy with multiple admissions for heart failure, A-fib, CKD, severe RA with multiple joints involved was recently discharged about 1 month prior.  During that admission had a large left pleural effusion on dobutamine, Bumex, thoracentesis, left and right heart cath showing normal coronaries but severe volume overload.  Also had GEORGE and leukopenia.  Now admitted on  with painful decubitus ulcer.  Patient is bedbound with severe RA on methotrexate and prednisone.  Afebrile, 2 L nasal cannula.  Seen by general surgery and taken the OR on  status post excision of cyst and sharp debridement of the decubitus ulcer.  Cultures with E.  coli and anaerobes.  IV ceftriaxone for sacral wound and UTI completed 8/21.  Now with fever of 100.2 on 8/22 with increased WBC.  Blood culture sent with staph not aureus in 1 out of 2 sets.  Chest x-ray with no new focal opacity with persistent edema noted.  Goals of care discussion ongoing related to poor appetite, pain control.     OBJECTIVE    /85 (BP Location: Radial)   Pulse 60   Temp 97.6 °F (36.4 °C) (Temporal)   Resp 17   Wt 120 lb (54.4 kg)   SpO2 93%   BMI 20.60 kg/m²     Gen:   Awake, in bed  HEENT:  EOMI, neck supple, 1L NC  CV/lungs:  RRR, CTAB  Abdom:  Soft, no TTP  Skin/extrem:  No rashes, no c/c/e, hand contractures noted  :   Purewick+  Lines:  PIV+    Laboratory Data: Reviewed     Microbiology: Reviewed     Radiology: Reviewed    [facility]  [phone]

## 2024-08-26 NOTE — PROGRESS NOTES
Daily Pulmonary/ICU/Critical Care Progress Note          SUBJECTIVE:  Afebrile on NC  No new complaints      ALLERGIES:  Allergies   Allergen Reactions    Lisinopril Coughing         MEDS:  Home Medications:  Outpatient Medications Marked as Taking for the 8/12/24 encounter (Hospital Encounter)   Medication Sig Dispense Refill    bumetanide 1 MG Oral Tab Take 1 tablet (1 mg total) by mouth daily. 30 tablet 2    carvedilol 6.25 MG Oral Tab Take 0.5 tablets (3.125 mg total) by mouth 2 (two) times daily with meals. 30 tablet 2    sennosides (SENNA-TIME) 8.6 MG Oral Tab senna 8.6 mg tablet, [RxNorm: 049986]      cyclobenzaprine 10 MG Oral Tab Take 1 tablet (10 mg total) by mouth nightly. 30 tablet 1    fentaNYL 12 MCG/HR Transdermal Patch 72 Hr Place 1 patch onto the skin every third day. (Patient taking differently: Place 1 patch onto the skin every third day. Placed 8/10 left arm) 10 patch 0    midodrine 5 MG Oral Tab Take 1 tablet (5 mg total) by mouth 2 (two) times daily before meals. 60 tablet 0    senna-docusate 8.6-50 MG Oral Tab Take 2 tablets by mouth daily. 30 tablet 0    isosorbide dinitrate 20 MG Oral Tab Take 1 tablet (20 mg total) by mouth TID (Nitrates). 90 tablet 3    gabapentin 300 MG Oral Cap Take 1 capsule (300 mg total) by mouth 3 (three) times daily. 90 capsule 0    dapagliflozin (FARXIGA) 10 MG Oral Tab Take 1 tablet (10 mg total) by mouth daily. 30 tablet 5    spironolactone 25 MG Oral Tab       alendronate 70 MG Oral Tab Take 1 tablet (70 mg total) by mouth once a week. (Patient taking differently: Take 1 tablet (70 mg total) by mouth once a week. Every Tuesday) 12 tablet 3    methotrexate 2.5 MG Oral Tab TAKE 6 TABLETS BY MOUTH 1 TIME A WEEK 77 tablet 0    lidocaine-menthol 4-1 % External Patch Place 1 patch onto the skin daily. 30 patch 0    PANTOPRAZOLE 40 MG Oral Tab EC TAKE 1 TABLET(40 MG) BY MOUTH TWICE DAILY BEFORE MEALS 180 tablet 0    SACUBITRIL-VALSARTAN 49-51 MG Oral Tab Take 1 tablet  by mouth 2 (two) times daily. 60 tablet 1    folic acid 800 MCG Oral Tab Take 1 tablet (800 mcg total) by mouth daily. 90 tablet 0    amiodarone 200 MG Oral Tab Take 1 tablet (200 mg total) by mouth daily.      ferrous sulfate 325 (65 FE) MG Oral Tab EC Take 1 tablet (325 mg total) by mouth daily with breakfast.       Scheduled Medication:   meropenem  500 mg Intravenous q12h    Vancomycin IV  1 each Intravenous See Admin Instructions (RX holding)    vancomycin  250 mg Oral QID    levETIRAcetam  500 mg Intravenous Daily    heparin  5,000 Units Subcutaneous Q8H BR[place]NN    acetaminophen  650 mg Oral Q8H    patiromer  8.4 g Oral Once    sacubitril-valsartan  1 tablet Oral BID    bumetanide  1 mg Oral Daily    metoprolol succinate  25 mg Oral Daily Beta Blocker    midodrine  5 mg Oral TID    sodium hypochlorite   Topical BID    amiodarone  200 mg Oral Daily    fentaNYL  1 patch Transdermal Q72H    ferrous sulfate  325 mg Oral Daily with breakfast    folic acid  800 mcg Oral Daily    gabapentin  300 mg Oral TID    [Held by provider] isosorbide dinitrate  20 mg Oral TID (Nitrates)    pantoprazole  40 mg Oral BID AC     Continuous Infusing Medication:   dextrose 5%-sodium chloride 0.9% 50 mL/hr at 08/22/24 1128     PRN Medications:    HYDROcodone-acetaminophen    HYDROmorphone **OR** [DISCONTINUED] HYDROmorphone **OR** [DISCONTINUED] HYDROmorphone    senna-docusate    polyethylene glycol (PEG 3350)    glucose **OR** glucose **OR** glucose-vitamin C **OR** dextrose **OR** glucose **OR** glucose **OR** glucose-vitamin C    traMADol    ondansetron    metoclopramide    acetaminophen       PHYSICAL EXAM:  BP (!) 133/92 (BP Location: Radial)   Pulse 60   Temp 97.4 °F (36.3 °C) (Temporal)   Resp 17   Wt 120 lb (54.4 kg)   SpO2 95%   BMI 20.60 kg/m²          CONSTITUTIONAL: alert, oriented, no apparent distress  HEENT: atraumatic normocephalic  MOUTH: mucous membranes are moist. No OP exudates  NECK/THROAT: no JVD. Trachea  midline. No obvious thyromegaly  LUNG: clear b/l upper, no wheezing, crackles. Chest symmetric with respiratory motion  HEART: regular rate and rhythm, no obvious murmers or gallops noted  ABD: soft non tender. + bowel sounds. No organomegaly noted  EXT: no clubbing, cyanosis, or edema noted. Pulses intact grossly      IMAGES:   Reviewed in EMR      LABS:  Recent Labs   Lab 08/24/24  0423 08/25/24  0435 08/26/24  0407   RBC 2.78* 3.10* 2.64*   HGB 8.2* 9.1* 7.9*   HCT 27.1* 29.8* 25.1*   MCV 97.5 96.1 95.1   MCH 29.5 29.4 29.9   MCHC 30.3* 30.5* 31.5   RDW 19.5* 19.6* 19.6*   NEPRELIM 18.13* 35.23* 42.04*   WBC 25.5* 40.6* 48.1*   .0* 436.0 438.0       Recent Labs   Lab 08/24/24  0423 08/25/24  0730 08/26/24  0324   GLU 87 135* 112*   BUN 50* 42* 52*   CREATSERUM 2.01* 1.90* 1.79*   EGFRCR 25* 27* 29*   CA 9.1 9.0 8.6*   ALB 3.2 3.2 3.1*   * 135* 139   K 4.7 4.9 4.3    107 112   CO2 21.0 18.0* 19.0*   ALKPHO 160* 168* 161*   AST 14 12 13   ALT <7* <7* <7*   BILT 0.5 0.4 0.3   TP 6.5 6.6 6.4       ASSESSMENT/PLAN:  1.sacral decub ulcer s/p I+D  -wound care following  -S.aureus bacteremia  -continue abx as per ID  -monitor WBC ct-trending up so CT C/A/P ordered for further evaluation.     2.TME  -CT head unrevealing  -close neuro monitoring    3. NICM  -continue amio, bb, bumex, entresto  -cardiology was previously following    4.GEORGE on CKD  -on bumex and midodrine    Proph:  DVT proph: hep subcutaneous  GI proph: PPI    Dispo:  DNR select  Palliative following    Ok for transfer out of ICU.      Thank you for the opportunity to care for Margaret Massey DO, MPH  Pulmonary Critical Care Medicine

## 2024-08-26 NOTE — PLAN OF CARE
Problem: Patient Centered Care  Goal: Patient preferences are identified and integrated in the patient's plan of care  Description: Interventions:  - Provide timely, complete, and accurate information to patient/family  - Incorporate patient and family knowledge, values, beliefs, and cultural backgrounds into the planning and delivery of care  - Encourage patient/family to participate in care and decision-making at the level they choose  - Honor patient and family perspectives and choices  Outcome: Progressing     Problem: PAIN - ADULT  Goal: Verbalizes/displays adequate comfort level or patient's stated pain goal  Description: INTERVENTIONS:  - Encourage pt to monitor pain and request assistance  - Assess pain using appropriate pain scale  - Administer analgesics based on type and severity of pain and evaluate response  - Implement non-pharmacological measures as appropriate and evaluate response  - Consider cultural and social influences on pain and pain management  - Manage/alleviate anxiety  - Utilize distraction and/or relaxation techniques  - Monitor for opioid side effects  - Notify MD/LIP if interventions unsuccessful or patient reports new pain  - Anticipate increased pain with activity and pre-medicate as appropriate  Outcome: Progressing     Problem: RISK FOR INFECTION - ADULT  Goal: Absence of fever/infection during anticipated neutropenic period  Description: INTERVENTIONS  - Monitor WBC  - Administer growth factors as ordered  - Implement neutropenic guidelines  Outcome: Progressing     Problem: SAFETY ADULT - FALL  Goal: Free from fall injury  Description: INTERVENTIONS:  - Assess pt frequently for physical needs  - Identify cognitive and physical deficits and behaviors that affect risk of falls.  - [place] fall precautions as indicated by assessment.  - Educate pt/family on patient safety including physical limitations  - Instruct pt to call for assistance with activity based on assessment  -  Modify environment to reduce risk of injury  - Provide assistive devices as appropriate  - Consider OT/PT consult to assist with strengthening/mobility  - Encourage toileting schedule  Outcome: Progressing     Problem: SKIN/TISSUE INTEGRITY - ADULT  Goal: Skin integrity remains intact  Description: INTERVENTIONS  - Assess and document risk factors for pressure ulcer development  - Assess and document skin integrity  - Monitor for areas of redness and/or skin breakdown  - Initiate interventions, skin care algorithm/standards of care as needed  Outcome: Progressing  Goal: Incision(s), wounds(s) or drain site(s) healing without S/S of infection  Description: INTERVENTIONS:  - Assess and document risk factors for pressure ulcer development  - Assess and document skin integrity  - Assess and document dressing/incision, wound bed, drain sites and surrounding tissue  - Implement wound care per orders  - Initiate isolation precautions as appropriate  - Initiate Pressure Ulcer prevention bundle as indicated  Outcome: Progressing  Goal: Oral mucous membranes remain intact  Description: INTERVENTIONS  - Assess oral mucosa and hygiene practices  - Implement preventative oral hygiene regimen  - Implement oral medicated treatments as ordered  Outcome: Progressing     Problem: Delirium  Goal: Minimize duration of delirium  Description: Interventions:  - Encourage use of hearing aids, eye glasses  - Promote highest level of mobility daily  - Provide frequent reorientation  - Promote wakefulness i.e. lights on, blinds open  - Promote sleep, encourage patient's normal rest cycle i.e. lights off, TV off, minimize noise and interruptions  - Encourage family to assist in orientation and promotion of home routines  Outcome: Progressing

## 2024-08-26 NOTE — CM/SW NOTE
08/26/24 1800   Discharge Needs   Anticipated D/C needs Subacute rehab   Services Requested   Submitted to [facility] Yes   PASRR Level 1 Submitted Yes   Choice of Post-Acute Provider   Informed patient of right to choose their preferred provider Yes   List of appropriate post-acute services provided to patient/family with quality data Yes   Information given to Daughter     [facility] list emailed to daughter and sent to Jes GIL via tube station to place in room.  PASRR completed and uploaded.    PLAN:  For [facility], need choice and insurance approval.    Sandy Raza MBA BSN RN CRRN   RN Case Manager  [phone]

## 2024-08-26 NOTE — CONGREGATE LIVING REVIEW
Critical access hospital Living Authorization    The [facility] Review Committee has reviewed this case and the patient IS APPROVED for discharge to a facility for Short Term Skilled once the following procedure is followed:     - The physician discharge instructions (contained within the YOUSUF note for SNF) must inlcude the below appropriate and approved COVID instructions to the facility    For questions regarding CLRC approval process, please contact the CM assigned to the case.  For questions regarding RN discharge workflow, please contact the unit Clinical Leader.

## 2024-08-27 LAB
BASOPHILS # BLD: 0 X10(3) UL (ref 0–0.2)
BASOPHILS NFR BLD: 0 %
DEPRECATED RDW RBC AUTO: 71.2 FL (ref 35.1–46.3)
EOSINOPHIL # BLD: 0 X10(3) UL (ref 0–0.7)
EOSINOPHIL NFR BLD: 0 %
ERYTHROCYTE [DISTWIDTH] IN BLOOD BY AUTOMATED COUNT: 20.4 % (ref 11–15)
GLUCOSE BLDC GLUCOMTR-MCNC: 104 MG/DL (ref 70–99)
GLUCOSE BLDC GLUCOMTR-MCNC: 106 MG/DL (ref 70–99)
GLUCOSE BLDC GLUCOMTR-MCNC: 61 MG/DL (ref 70–99)
GLUCOSE BLDC GLUCOMTR-MCNC: 62 MG/DL (ref 70–99)
GLUCOSE BLDC GLUCOMTR-MCNC: 65 MG/DL (ref 70–99)
GLUCOSE BLDC GLUCOMTR-MCNC: 67 MG/DL (ref 70–99)
GLUCOSE BLDC GLUCOMTR-MCNC: 73 MG/DL (ref 70–99)
GLUCOSE BLDC GLUCOMTR-MCNC: 73 MG/DL (ref 70–99)
GLUCOSE BLDC GLUCOMTR-MCNC: 75 MG/DL (ref 70–99)
GLUCOSE BLDC GLUCOMTR-MCNC: 81 MG/DL (ref 70–99)
HCT VFR BLD AUTO: 28.5 %
HGB BLD-MCNC: 8.5 G/DL
LYMPHOCYTES NFR BLD: 0.93 X10(3) UL (ref 1–4)
LYMPHOCYTES NFR BLD: 3 %
MCH RBC QN AUTO: 29.3 PG (ref 26–34)
MCHC RBC AUTO-ENTMCNC: 29.8 G/DL (ref 31–37)
MCV RBC AUTO: 98.3 FL
METAMYELOCYTES # BLD: 0.31 X10(3) UL
METAMYELOCYTES NFR BLD: 1 %
MONOCYTES # BLD: 2.18 X10(3) UL (ref 0.1–1)
MONOCYTES NFR BLD: 7 %
MYELOCYTES # BLD: 0.62 X10(3) UL
MYELOCYTES NFR BLD: 2 %
NEUTROPHILS # BLD AUTO: 23.48 X10 (3) UL (ref 1.5–7.7)
NEUTROPHILS NFR BLD: 87 %
NEUTS HYPERSEG # BLD: 27.06 X10(3) UL (ref 1.5–7.7)
PLATELET # BLD AUTO: 461 10(3)UL (ref 150–450)
PLATELET MORPHOLOGY: NORMAL
RBC # BLD AUTO: 2.9 X10(6)UL
TOTAL CELLS COUNTED BLD: 100
VANCOMYCIN TROUGH SERPL-MCNC: 6.8 UG/ML (ref 10–20)
WBC # BLD AUTO: 31.1 X10(3) UL (ref 4–11)

## 2024-08-27 PROCEDURE — 99231 SBSQ HOSP IP/OBS SF/LOW 25: CPT | Performed by: NURSE PRACTITIONER

## 2024-08-27 PROCEDURE — 99232 SBSQ HOSP IP/OBS MODERATE 35: CPT | Performed by: INTERNAL MEDICINE

## 2024-08-27 PROCEDURE — 99233 SBSQ HOSP IP/OBS HIGH 50: CPT | Performed by: INTERNAL MEDICINE

## 2024-08-27 RX ORDER — MORPHINE SULFATE 15 MG/1
15 TABLET, FILM COATED, EXTENDED RELEASE ORAL EVERY 12 HOURS SCHEDULED
Status: DISCONTINUED | OUTPATIENT
Start: 2024-08-27 | End: 2024-08-27

## 2024-08-27 RX ORDER — DEXTROSE MONOHYDRATE AND SODIUM CHLORIDE 5; .9 G/100ML; G/100ML
INJECTION, SOLUTION INTRAVENOUS CONTINUOUS
Status: DISCONTINUED | OUTPATIENT
Start: 2024-08-27 | End: 2024-09-03

## 2024-08-27 RX ORDER — HYDROCODONE BITARTRATE AND ACETAMINOPHEN 10; 325 MG/1; MG/1
2 TABLET ORAL EVERY 4 HOURS PRN
Status: DISCONTINUED | OUTPATIENT
Start: 2024-08-27 | End: 2024-09-10

## 2024-08-27 NOTE — PHYSICAL THERAPY NOTE
Physical Therapy Contact Note    Orders received and chart reviewed. Attempted to see patient for Physical Therapy services. Per discussion with RN patient reports severe pain and requesting to defer PT until after pain medication. Will re-schedule visit.    Jeniffer Buitrago, PT, DPT

## 2024-08-27 NOTE — PALLIATIVE CARE NOTE
[facility]  part of [facility]  Palliative Care Progress Note    Margaret Silverio Patient Status:  Inpatient    3/14/1945 MRN [id number]   Location [facility] 4W/SW/SE Attending Fernando Galan MD   Hosp Day # 15 PCP Johnathon Rodriguez,      The  Cures Act makes medical notes like these available to patients in the interest of transparency. Please be advised this is a medical document. Medical documents are intended to carry relevant information, facts as evident, and the clinical opinion of the practitioner. The medical note is intended as peer to peer communication and may appear blunt or direct. It is written in medical language and may contain abbreviations or verbiage that are unfamiliar.     Subjective     When I entered the room, the patient was in the bed, she is awake and alert. Very weak she is crying stating she is in pain, RN just medicated with Dilaudid ivp. Margaret declined Fentanyl patch as she stated that the fentanyl patch made her drowsy. Discussed use of MSER 15mg twice daily, Margaret did not disagree to this plan.     Review of pertinent medication requirements in past 24 hours:  Norco 10/325 X4, Dilaudid 0.2mgX2    Palliative Care symptom needs assessed:     Co/ pain in buttock where decubiti is present  Appetite waxes and wanes.   Denies constipation    Allergies:  Allergies   Allergen Reactions    Lisinopril Coughing       Medications:     Current Facility-Administered Medications:     dextrose 5%-sodium chloride 0.9% infusion, , Intravenous, Continuous    vancomycin (Vancocin) cap 125 mg, 125 mg, Oral, q12h    meropenem (Merrem) 500 mg in sodium chloride 0.9% 100 mL IVPB-MBP, 500 mg, Intravenous, q12h    HYDROcodone-acetaminophen (Norco)  MG per tab 1 tablet, 1 tablet, Oral, Q4H PRN    levETIRAcetam (Keppra) 500 mg/5mL injection 500 mg, 500 mg, Intravenous, Daily    heparin (Porcine) 5000 UNIT/ML injection 5,000 Units, 5,000 Units,  Subcutaneous, Q8H [place]    HYDROmorphone (Dilaudid) 1 MG/ML injection 0.2 mg, 0.2 mg, Intravenous, Q4H PRN **OR** [DISCONTINUED] HYDROmorphone (Dilaudid) 1 MG/ML injection 0.4 mg, 0.4 mg, Intravenous, Q3H PRN **OR** [DISCONTINUED] HYDROmorphone (Dilaudid) 1 MG/ML injection 0.8 mg, 0.8 mg, Intravenous, Q3H PRN    acetaminophen (Tylenol) 160 MG/5ML oral liquid 650 mg, 650 mg, Oral, Q8H    senna-docusate (Senokot-S) 8.6-50 MG per tab 2 tablet, 2 tablet, Oral, Daily PRN    polyethylene glycol (PEG 3350) (Miralax) 17 g oral packet 17 g, 17 g, Oral, Daily PRN    patiromer (Veltassa) 8.4 g oral packet 8.4 g, 8.4 g, Oral, Once    glucose (Dex4) 15 GM/59ML oral liquid 15 g, 15 g, Oral, Q15 Min PRN **OR** glucose (Glutose) 40% oral gel 15 g, 15 g, Oral, Q15 Min PRN **OR** glucose-vitamin C (Dex-4) chewable tab 4 tablet, 4 tablet, Oral, Q15 Min PRN **OR** dextrose 50% injection 50 mL, 50 mL, Intravenous, Q15 Min PRN **OR** glucose (Dex4) 15 GM/59ML oral liquid 30 g, 30 g, Oral, Q15 Min PRN **OR** glucose (Glutose) 40% oral gel 30 g, 30 g, Oral, Q15 Min PRN **OR** glucose-vitamin C (Dex-4) chewable tab 8 tablet, 8 tablet, Oral, Q15 Min PRN    traMADol (Ultram) tab 50 mg, 50 mg, Oral, Q12H PRN    sacubitril-valsartan (Entresto) 24-26 MG per tab 1 tablet, 1 tablet, Oral, BID    bumetanide (Bumex) tab 1 mg, 1 mg, Oral, Daily    metoprolol succinate ER (Toprol XL) 24 hr tab 25 mg, 25 mg, Oral, Daily Beta Blocker    midodrine (ProAmatine) tab 5 mg, 5 mg, Oral, TID    ondansetron (Zofran) 4 MG/2ML injection 4 mg, 4 mg, Intravenous, Q6H PRN    metoclopramide (Reglan) 5 mg/mL injection 5 mg, 5 mg, Intravenous, Q8H PRN    sodium hypochlorite (Dakin's) 0.125 % external solution, , Topical, BID    acetaminophen (Tylenol Extra Strength) tab 500 mg, 500 mg, Oral, Q4H PRN    amiodarone (Pacerone) tab 200 mg, 200 mg, Oral, Daily    fentaNYL (Duragesic) 12 MCG/HR patch 1 patch, 1 patch, Transdermal, Q72H    ferrous sulfate DR tab 325 mg, 325 mg,  Oral, Daily with breakfast    folic acid (Folvite) tab 800 mcg, 800 mcg, Oral, Daily    gabapentin (Neurontin) cap 300 mg, 300 mg, Oral, TID    [Held by provider] isosorbide dinitrate (Isordil) tab 20 mg, 20 mg, Oral, TID (Nitrates)    pantoprazole (Protonix) DR tab 40 mg, 40 mg, Oral, BID AC    Objective     Vital Signs:  Blood pressure 131/70, pulse 61, temperature 98.2 °F (36.8 °C), temperature source Temporal, resp. rate 13, weight 120 lb (54.4 kg), SpO2 100%.  Body mass index is 20.6 kg/m².  Present Level of pain: + pain she does not rate the pain, however she is crying.   Non-verbal signs of pain present: yes + facial grimace and crying    Physical Exam:  General: Margaret is in the bed she is awake and alert, very weak, she is crying due to pain appears elderly, cachetic and frail.   HEENT: dry oral MM  Cardiac: + murmer regular rate and rhythm, S1, S2 normal, no rub or gallop.  Lungs: clear without wheezes.  Normal excursions and effort. On RA  Abdomen: Soft, flat non-tender, + bowel sounds, no rebound or guarding, + BM  Extremities: Without clubbing, cyanosis. BLE Edema not present  Neurologic: more awake today asking about going home.  follows simple commands, flat affect  Skin: Warm and dry.  + sacral wound see wound care note     Prior to admission Palliative performance scale PPSv2 (%): 30    Hematology:  Lab Results   Component Value Date    WBC 31.1 (H) 08/27/2024    HGB 8.5 (L) 08/27/2024    HCT 28.5 (L) 08/27/2024    .0 (H) 08/27/2024       Coags:  Lab Results   Component Value Date    INR 1.25 (H) 02/09/2024    PTT 35.2 02/09/2024       Chemistry:  Lab Results   Component Value Date    CREATSERUM 1.79 (H) 08/26/2024    BUN 52 (H) 08/26/2024     08/26/2024    K 4.3 08/26/2024     08/26/2024    CO2 19.0 (L) 08/26/2024     (H) 08/26/2024    CA 8.6 (L) 08/26/2024    ALB 3.1 (L) 08/26/2024    ALKPHO 161 (H) 08/26/2024    BILT 0.3 08/26/2024    TP 6.4 08/26/2024    AST 13  08/26/2024    ALT <7 (L) 08/26/2024    MG 2.1 08/21/2024    PHOS 2.9 08/21/2024       Imaging:  No results found.      Summary of Discussion    8/27/24 Followed up with Margaret today, she is crying and in pain. Margaret stopped Fentanyl patch she states she doesn't want it due to the drowsiness it causes. Discussed current pain regimen and encouraged long acting to help with chronic pain and sacral pain.  Margaret is struggling as she experiences chronic pain however the pain medication causes increased drowsiness. I have scheduled Tylenol and she refuses the Tylenol along with other medications at times. Appetite has been poor.   I called Barbi pts dtr provided overall clinical update and pain management. Barbi stated she is interested in [facility] for her mother for CÉSAR. Barbi is also interested in hospice informational session. I notified the hospice team and CM.    8/26/24 followed up with pt today, she is asking about going home, she is indicating interest in comfort measures at home. Margaret misses her  and kids and feels like she is declining and not getting better while here at the hospital. Discussed elevated WBC's CT scan today with Margaret.   I called dtr Barbi and dicussed her mothers wishes to return to home. I discussed meeting with hospice team for informational session.   Barbi wants to speak with her father first and see what CT scan results will be. She is interested in hospice informational session however will wait until I follow up tomorrow to discuss further.     8/23/24 followed up with pt today, she is awake and alert. RN is feeding her breakfast cereal. Pt c/o pain in sacral decubiti with scrunched forehead. Discussed with RN use of Norco and holding for any drowsiness. Will have PT/OT try to work with pt eval and treat. Will need to continue to see how she progresses. Ongoing GOC will be needed pending her progress.     8/21/24 followed up with Margaret today she is  more awake on exam, her dtr Barbi is present at the bedside, Margaret has been refusing to eat, she states the food does not taste good. Barbi stated her father brought in some soup from home a day ago and she ate the soup. Discussed bringing in food from home that she likes to eat, discussed with Margaret need for nutritional protein to help with wound healing and general healing.   I explored with Margaret if she is feeling depressed she denied depression. Discussed use of Remeron to help with appetite, there is concern for increased drowsiness with the Remeron, discussed use of Cymbalta, pt declines antidepressant at this time.   I explored with Margaret if she wanted feeding tube she stated no she would not want feeding tube.   Barbi stated she asked her mother if she wanted to die as she is not taking her medications nor eating well.  Margaret stated she is not ready to die.   I discussed use of the Norco as needed  as pt expressed not wanting to be too sleepy however she continues to experience a lot pain in her buttock area. She has not taken anything for pain since last night and has been refusing to take the scheduled tylenol. We discussed this and I asked the RN to provide dose of Norco. I discussed with pt and dtr to call if needed I will follow up on Friday as I am off tomorrow. One of my coworkers will follow up tomorrow if needed.     8/20/24 Followed up with Margaret today, she is not responsive for me on exam, tries to open her eyes when asked. vital signs stable, she did receive 2 doses of the 0.4mg Dilaudid over the last 24 hours. I spoke with her dtr Barbi expressed concern over managing her pain and trying to keep her awake and alert to eat and participate in care.   Barbi stated her mother experienced this last week where she was not responsive.   Rn reported pt was crying overnight due to pain  Will continue with the Fentanyl patch as that is not new. Decrease the Dilaudid to 0.2mg every 4  hours and will monitor. discussed with dtr Barbi.       Assessment and Recommendation      Pressure injury of skin of sacral region, unspecified injury stage    [id number] following.    S/P I&D 8/13/24    Pancytopenia    Non ischemic Cardiomyopathy    SSS PPM/AICD    Severe aortic stenosis    Afib    Urinary tract infection without hematuria,    Anemia    Encephalopathy    CKD    H/o GIB    Frequent hospitalizations    H/o hypotension     Pain-concern of increased drowsiness will monitor pt. -pt has been refusing to take medications, I discussed this with her.   -continue with scheduled Tylenol 650mg every 8 hours-pt has been refusing   -Continue with Fentanyl patch 12mcg-pt refused last dose.   -Dilaudid 0.2mg every 4 hours as needed for more severe pain  -continue with gabapentin 300mg TID  -stop the Flexeril nightly  -Norco 10/325 1-2 as needed every 4 hours-hold for any drowsiness.      Poor appetite  -offered Remeron daily for depression and appetite, pt declines this at this time.   Offered Cymbalta she declined      Bowel regimen  -add senna daily as needed  -Miralax daily as needed     Goals of care counseling  -see above for details  -Pt is DNAR/DNI with selective treatment  -Agreeable to palliative care following  -Dispo: TBD. Dtr is agreeable to [facility]. SW to help with dc planning.   - not requested.   -Margaret stated she would not want feeding tube.   -ongoing GOC discussions will be needed over time.  -pts dtr Barbi is asking for hospice informational session while here.   -Provided emotional support to pt/family who are coping adequately     Advance care planning  -see above for details  -Pt's dtr Barbi Silverio is HCPOA [phone]  -HCPOA and POLST pw are on file in [place].      Palliative Performance Scale 30%    Discussed with Sandy BLACKBURN, Christine GIL and Dr Zhang     Palliative Care Follow Up: Palliative care team will continue to follow. Feel free to contact our team with any  questions or concerns.    Thank you for allowing Palliative Care services to participate in the care of Margaret Silverio.    A total of 25 minutes were spent on this follow-up, which included all of the following: chart review, direct face to face contact, history taking, physical examination, counseling and coordinating care, and documentation.     Macy Marrufo, [id number]  8/27/2024  1:46PM  Palliative Care Services

## 2024-08-27 NOTE — PLAN OF CARE
Problem: PAIN - ADULT  Goal: Verbalizes/displays adequate comfort level or patient's stated pain goal  Description: INTERVENTIONS:  - Encourage pt to monitor pain and request assistance  - Assess pain using appropriate pain scale  - Administer analgesics based on type and severity of pain and evaluate response  - Implement non-pharmacological measures as appropriate and evaluate response  - Consider cultural and social influences on pain and pain management  - Manage/alleviate anxiety  - Utilize distraction and/or relaxation techniques  - Monitor for opioid side effects  - Notify MD/LIP if interventions unsuccessful or patient reports new pain  - Anticipate increased pain with activity and pre-medicate as appropriate  Outcome: Progressing     Problem: RISK FOR INFECTION - ADULT  Goal: Absence of fever/infection during anticipated neutropenic period  Description: INTERVENTIONS  - Monitor WBC  - Administer growth factors as ordered  - Implement neutropenic guidelines  Outcome: Progressing     Problem: SAFETY ADULT - FALL  Goal: Free from fall injury  Description: INTERVENTIONS:  - Assess pt frequently for physical needs  - Identify cognitive and physical deficits and behaviors that affect risk of falls.  - [place] fall precautions as indicated by assessment.  - Educate pt/family on patient safety including physical limitations  - Instruct pt to call for assistance with activity based on assessment  - Modify environment to reduce risk of injury  - Provide assistive devices as appropriate  - Consider OT/PT consult to assist with strengthening/mobility  - Encourage toileting schedule  Outcome: Progressing     Problem: SKIN/TISSUE INTEGRITY - ADULT  Goal: Skin integrity remains intact  Description: INTERVENTIONS  - Assess and document risk factors for pressure ulcer development  - Assess and document skin integrity  - Monitor for areas of redness and/or skin breakdown  - Initiate interventions, skin care  algorithm/standards of care as needed  Outcome: Progressing     Problem: SKIN/TISSUE INTEGRITY - ADULT  Goal: Incision(s), wounds(s) or drain site(s) healing without S/S of infection  Description: INTERVENTIONS:  - Assess and document risk factors for pressure ulcer development  - Assess and document skin integrity  - Assess and document dressing/incision, wound bed, drain sites and surrounding tissue  - Implement wound care per orders  - Initiate isolation precautions as appropriate  - Initiate Pressure Ulcer prevention bundle as indicated  Outcome: Progressing     Problem: Delirium  Goal: Minimize duration of delirium  Description: Interventions:  - Encourage use of hearing aids, eye glasses  - Promote highest level of mobility daily  - Provide frequent reorientation  - Promote wakefulness i.e. lights on, blinds open  - Promote sleep, encourage patient's normal rest cycle i.e. lights off, TV off, minimize noise and interruptions  - Encourage family to assist in orientation and promotion of home routines  Outcome: Progressing    Pain controlled with Norco PO  Dressing changed per shift and PRN  Prompt incontinence care provided, turned q2hrs and PRN  Hypoglycemia covered per protocol and Dr Zhang aware - D5.9 @ 75/hr

## 2024-08-27 NOTE — PLAN OF CARE
Problem: Patient Centered Care  Goal: Patient preferences are identified and integrated in the patient's plan of care  Description: Interventions:  - What would you like us to know as we care for you?   - Provide timely, complete, and accurate information to patient/family  - Incorporate patient and family knowledge, values, beliefs, and cultural backgrounds into the planning and delivery of care  - Encourage patient/family to participate in care and decision-making at the level they choose  - Honor patient and family perspectives and choices  Outcome: Progressing     Problem: Patient/Family Goals  Goal: Patient/Family Long Term Goal  Description: Patient's Long Term Goal:     Interventions:  -   - See additional Care Plan goals for specific interventions  Outcome: Progressing  Goal: Patient/Family Short Term Goal  Description: Patient's Short Term Goal:     Interventions:   -   - See additional Care Plan goals for specific interventions  Outcome: Progressing     Problem: PAIN - ADULT  Goal: Verbalizes/displays adequate comfort level or patient's stated pain goal  Description: INTERVENTIONS:  - Encourage pt to monitor pain and request assistance  - Assess pain using appropriate pain scale  - Administer analgesics based on type and severity of pain and evaluate response  - Implement non-pharmacological measures as appropriate and evaluate response  - Consider cultural and social influences on pain and pain management  - Manage/alleviate anxiety  - Utilize distraction and/or relaxation techniques  - Monitor for opioid side effects  - Notify MD/LIP if interventions unsuccessful or patient reports new pain  - Anticipate increased pain with activity and pre-medicate as appropriate  Outcome: Progressing     Problem: RISK FOR INFECTION - ADULT  Goal: Absence of fever/infection during anticipated neutropenic period  Description: INTERVENTIONS  - Monitor WBC  - Administer growth factors as ordered  - Implement neutropenic  guidelines  Outcome: Progressing     Problem: SAFETY ADULT - FALL  Goal: Free from fall injury  Description: INTERVENTIONS:  - Assess pt frequently for physical needs  - Identify cognitive and physical deficits and behaviors that affect risk of falls.  - [place] fall precautions as indicated by assessment.  - Educate pt/family on patient safety including physical limitations  - Instruct pt to call for assistance with activity based on assessment  - Modify environment to reduce risk of injury  - Provide assistive devices as appropriate  - Consider OT/PT consult to assist with strengthening/mobility  - Encourage toileting schedule  Outcome: Progressing     Problem: SKIN/TISSUE INTEGRITY - ADULT  Goal: Skin integrity remains intact  Description: INTERVENTIONS  - Assess and document risk factors for pressure ulcer development  - Assess and document skin integrity  - Monitor for areas of redness and/or skin breakdown  - Initiate interventions, skin care algorithm/standards of care as needed  Outcome: Progressing  Goal: Incision(s), wounds(s) or drain site(s) healing without S/S of infection  Description: INTERVENTIONS:  - Assess and document risk factors for pressure ulcer development  - Assess and document skin integrity  - Assess and document dressing/incision, wound bed, drain sites and surrounding tissue  - Implement wound care per orders  - Initiate isolation precautions as appropriate  - Initiate Pressure Ulcer prevention bundle as indicated  Outcome: Progressing  Goal: Oral mucous membranes remain intact  Description: INTERVENTIONS  - Assess oral mucosa and hygiene practices  - Implement preventative oral hygiene regimen  - Implement oral medicated treatments as ordered  Outcome: Progressing     Problem: Delirium  Goal: Minimize duration of delirium  Description: Interventions:  - Encourage use of hearing aids, eye glasses  - Promote highest level of mobility daily  - Provide frequent reorientation  - Promote  wakefulness i.e. lights on, blinds open  - Promote sleep, encourage patient's normal rest cycle i.e. lights off, TV off, minimize noise and interruptions  - Encourage family to assist in orientation and promotion of home routines  Outcome: Progressing

## 2024-08-27 NOTE — PROGRESS NOTES
[facility]  part of [facility]     Progress Note    Margaret Silverio Patient Status:  Inpatient    3/14/1945 MRN [id number]   Location [facility] 2W/SW Attending Veto Zhang MD   Hosp Day # 15 PCP Johnathon Rodriguez,        Subjective:   Patient seen and examined.  Lethargic.  No significant distress.  No significant fevers over the last 24 hours    Objective:   Blood pressure 131/70, pulse 61, temperature 98.2 °F (36.8 °C), temperature source Temporal, resp. rate 13, weight 120 lb (54.4 kg), SpO2 100%.  Intake/Output:   Last 3 shifts: I/O last 3 completed shifts:  In: 690 [P.O.:590; IV PIGGYBACK:100]  Out: 800 [Urine:800]   This shift: No intake/output data recorded.     Vent Settings:      Hemodynamic parameters (last 24 hours):      Scheduled Meds:   Current Facility-Administered Medications   Medication Dose Route Frequency    dextrose 5%-sodium chloride 0.9% infusion   Intravenous Continuous    vancomycin (Vancocin) cap 125 mg  125 mg Oral q12h    meropenem (Merrem) 500 mg in sodium chloride 0.9% 100 mL IVPB-MBP  500 mg Intravenous q12h    HYDROcodone-acetaminophen (Norco)  MG per tab 1 tablet  1 tablet Oral Q4H PRN    levETIRAcetam (Keppra) 500 mg/5mL injection 500 mg  500 mg Intravenous Daily    heparin (Porcine) 5000 UNIT/ML injection 5,000 Units  5,000 Units Subcutaneous Q8H [place]    HYDROmorphone (Dilaudid) 1 MG/ML injection 0.2 mg  0.2 mg Intravenous Q4H PRN    acetaminophen (Tylenol) 160 MG/5ML oral liquid 650 mg  650 mg Oral Q8H    senna-docusate (Senokot-S) 8.6-50 MG per tab 2 tablet  2 tablet Oral Daily PRN    polyethylene glycol (PEG 3350) (Miralax) 17 g oral packet 17 g  17 g Oral Daily PRN    patiromer (Veltassa) 8.4 g oral packet 8.4 g  8.4 g Oral Once    glucose (Dex4) 15 GM/59ML oral liquid 15 g  15 g Oral Q15 Min PRN    Or    glucose (Glutose) 40% oral gel 15 g  15 g Oral Q15 Min PRN    Or    glucose-vitamin C (Dex-4) chewable tab 4 tablet  4 tablet  Oral Q15 Min PRN    Or    dextrose 50% injection 50 mL  50 mL Intravenous Q15 Min PRN    Or    glucose (Dex4) 15 GM/59ML oral liquid 30 g  30 g Oral Q15 Min PRN    Or    glucose (Glutose) 40% oral gel 30 g  30 g Oral Q15 Min PRN    Or    glucose-vitamin C (Dex-4) chewable tab 8 tablet  8 tablet Oral Q15 Min PRN    traMADol (Ultram) tab 50 mg  50 mg Oral Q12H PRN    sacubitril-valsartan (Entresto) 24-26 MG per tab 1 tablet  1 tablet Oral BID    bumetanide (Bumex) tab 1 mg  1 mg Oral Daily    metoprolol succinate ER (Toprol XL) 24 hr tab 25 mg  25 mg Oral Daily Beta Blocker    midodrine (ProAmatine) tab 5 mg  5 mg Oral TID    ondansetron (Zofran) 4 MG/2ML injection 4 mg  4 mg Intravenous Q6H PRN    metoclopramide (Reglan) 5 mg/mL injection 5 mg  5 mg Intravenous Q8H PRN    sodium hypochlorite (Dakin's) 0.125 % external solution   Topical BID    acetaminophen (Tylenol Extra Strength) tab 500 mg  500 mg Oral Q4H PRN    amiodarone (Pacerone) tab 200 mg  200 mg Oral Daily    fentaNYL (Duragesic) 12 MCG/HR patch 1 patch  1 patch Transdermal Q72H    ferrous sulfate DR tab 325 mg  325 mg Oral Daily with breakfast    folic acid (Folvite) tab 800 mcg  800 mcg Oral Daily    gabapentin (Neurontin) cap 300 mg  300 mg Oral TID    [Held by provider] isosorbide dinitrate (Isordil) tab 20 mg  20 mg Oral TID (Nitrates)    pantoprazole (Protonix) DR tab 40 mg  40 mg Oral BID AC       Continuous Infusions:    dextrose 5%-sodium chloride 0.9%         Physical Exam  Constitutional: Lethargic  Eyes: PERRL  ENT: nares pateint  Neck: supple, no JVD  Cardio: RRR, S1 S2  Respiratory: clear to auscultation bilaterally, no wheezing, rales, rhonchi, crackles  GI: abdomen soft, non tender, active bowel sounds, no organomegaly  Extremities: no clubbing, cyanosis, edema  Neurologic: no gross motor deficits  Skin: warm, dry      Results:            No results found.          Assessment   1.  Sacral decubitus ulcer status post debridement  2.   Leukocytosis  3.  Toxic metabolic encephalopathy  4.  Hyponatremia  5.  Nonischemic cardiomyopathy  6.  History of paroxysmal atrial fibrillation  7.  Anemia  8.  Acute kidney injury on chronic kidney disease  9.  Fevers  10.  Staph aureus bacteremia     Plan   -Patient presented with evidence of worsening sacral wound status post debridement on 8/13/2024.  Cultures positive for E. coli.  Completed course of antibiotic therapy.  -Most recent blood cultures 1 of 2 positive on 8/22/2024 for Staph aureus.  Seen by ID concern for contaminant.  -CT chest abdomen pelvis on 8/26/2024 with moderate left pleural effusion seen emphysematous changes seen.  Sacral decubitus ulcer present with no abscess or bone destruction seen.  -Restarted on antibiotic therapy with vancomycin and meropenem per ID recommendations  -Hold off stress dose steroids at this time  -Wound care  -CT head with no acute findings seen  -Close neuromonitoring  -Closely monitor renal function and urine output  -DVT prophylaxis: Heparin  -Patient DNR/DNI  -Okay to transfer to floor    Augustin Parsons DO  Pulmonary Critical Care Medicine  [facility]

## 2024-08-27 NOTE — HOSPICE RN NOTE
Spoke with daughter Barbi via telephone. Hospice informational meeting set for tomorrow 8/28 at 1pm. [facility] will continue to follow patient to provide support. POC Reviewed with RN Heather Garner RN, BSN  Transitional Nurse Liaison  [facility]  [phone] (After-hours)

## 2024-08-27 NOTE — PAYOR COMM NOTE
CONTINUE RECONSIDERATION    8/24/2024     **As a reminder, Medicare Advantage plans are instructed to provide, at a minimum, the same coverage that a traditional Medicare beneficiary would receive. Beyond any doubt, a traditional Medicare recipient in this same situation would have received Medicare coverage for these inpatient medically necessary services. **      CONTINUED STAY REVIEW    Payor: [employer]  Subscriber #:  [id number]  Authorization Number: [id number]    Admit date: 8/12/24  Admit time:  2:41 PM    REVIEW DOCUMENTATION:    8/24/2024  Hospitalist Progress Note     Subjective:  Pt was seen and examined.   Waxing and waning mentation. Transferred to ICU overnight for closer monitoring.        Objective:  Review of Systems:   ROS completed; pertinent positive and negatives stated in subjective.        Vital signs:  Temp:  [96.8 °F (36 °C)-101.6 °F (38.7 °C)] 96.8 °F (36 °C)  Pulse:  [60-65] 60  Resp:  [14-29] 25  BP: ()/(49-66) 133/60  SpO2:  [97 %-100 %] 99 %    Physical Exam:    Gen: alert, oriented x1  Chest: good air entry CTABL  CVS: normal s1 and s2 RR  Abd: NABS soft NT ND  Neuro: non-focal, following commands  Skin: decub dressing CDI    Recent Labs   Lab 08/20/24  0626 08/21/24  0555 08/22/24  0643 08/23/24  0426 08/24/24  0423   WBC 8.3 8.5 11.9* 15.1* 25.5*   HGB 8.1* 9.5* 8.4* 8.9* 8.2*   MCV 95.2 97.8 93.8 97.7 97.5   .0* 671.0* 658.0* 565.0* 543.0*   BAND 1 2 4 5 2               Recent Labs   Lab 08/22/24  0643 08/23/24  0426 08/24/24  0423   * 80 87   BUN 46* 42* 50*   CREATSERUM 1.44* 1.58* 2.01*   CA 8.8 9.1 9.1   ALB 3.1* 3.2 3.2   * 133* 135*   K 4.2 4.4 4.7    104 105   CO2 20.0* 21.0 21.0   ALKPHO 155* 154* 160*   AST 17 15 14   ALT <7* <7* <7*   BILT 0.4 0.5 0.5   TP 6.3 6.6 6.5        Assessment & Plan:  GPC bacteremia  Leukocytosis  Sacral decubitus ulcer  Gsx on consult  Now s/p Excision of cyst, sharp excisional debridement of  buttock decubitus ulcer POD #11  PRN pain control  Completed 10 days of abx  WBC 8.5 ->11.9 -> 15 -> 25.5  No fever curve, no obv source noted  Infectious workup negative so far  UA and repeat Bcx pending  ID on consult  Hold off of abx at this time  Repeat Bcx, follow previous cultures  Continue steroids  ICU on consult - appreciate recs  AMS -> intermittent   Unclear etiology, consider metabolic encephalopathy  Neurology following, EEG WNL  Keppra resumed  CT head non-acute  ABG reviewed  Hyponatremia  Na 129 -> 133 -> 135  Continue D5NS  Monitor labs  UTI  UA reviewed  Ucx pending -> e. coli  Continue IV abx - completed course of treatment  NICM, HFrEF  Hx of paroxysmal afib  Hx of BRANDYN occlusion  AV paced  Continue home meds  GEORGE on CKD 3 - stable  Monitor renal function  Avoid nephrotoxic agents  Anemia  Hgb stable today  Cont to monitor   Thrombocytosis  Monitor labs  Heparin s/c for DVT ppx  Nutrition  Dietitian on consult  Encouraged to eat more  Family reports the patient would not want a feeding tube  GOC  Palliative care following. Patient and family okay with CPC  Appreciate CM/SW placement     Plan of care discussed with RN at bedside         Goals of care: Palliative care has been consulted, discussions on going regarding DC plan, daughter will discuss with family before making a decision on Hospice care. Otherwise will dc with home palliative care.            Supplementary Documentation:  Quality:  DVT Prophylaxis: Heparin s/c  CODE status: DNAR/Select        8/24/2024 Infectious Disease   SUBJECTIVE  ROS done. Transferred to ICU yesterday. Febrile yesterday evening to 101.6.HR WNL. MAPs 60s this AM. RN reports small liquid stools, 3 charted yesterday. On exam, awake and weak appearing. HDS. No sob or distress on 1L NC. No abdom pain or nausea.      ASSESSMENT:     Antibiotics: None;  (IV ceftriaxone 8/12-8/21)     # Fever  # Staph not aureus bacteremia in 1/2 sets on 8/22/24 - suspect contaminant -  repeat pending; r/o IE               - has bioprosthetic MV and L chest PPM     # Leukocytosis - suspect reactive; r/o bacteremia; repeat cx pending  # GEORGE on CKD  # Anemia s/p transfusion 8/17     # Sacral wound stage 2 s/p OR debridement 8/13/24 - cx E.coli, anaerobes - currently no signs of infection     # Asymptomatic bacteruria - E. coli  # Intermittent AMS     # RA, bedbound, previously on MTX and prednisone               - received prednisone at last discharge; currently off related to limited PO     PLAN:     -  Monitor closely off abx.  -  FU cx.   -  Given recurrent fever iso MVR + PPM, check TTE.   -  Also given incr wbc (prior to steroids) + liquid stools, send CDPCR.   -  wound care  -  FU GOC. Palliative following.  -  Follow fever curve, wbc. Noted pt given 1x iv solu-medrol today.  -  Reviewed labs, micro, imaging reports  -  d/w patient, staff     History of Present Illness:  Margaret Silverio is a a(n) 79 year old female. Is a 79-year-old female with a history of severe nonischemic cardiomyopathy with multiple admissions for heart failure, A-fib, CKD, severe RA with multiple joints involved was recently discharged about 1 month prior.  During that admission had a large left pleural effusion on dobutamine, Bumex, thoracentesis, left and right heart cath showing normal coronaries but severe volume overload.  Also had GEORGE and leukopenia.  Now admitted on 8/12 with painful decubitus ulcer.  Patient is bedbound with severe RA on methotrexate and prednisone.  Afebrile, 2 L nasal cannula.  Seen by general surgery and taken the OR on 8/13 status post excision of cyst and sharp debridement of the decubitus ulcer.  Cultures with E. coli and anaerobes.  IV ceftriaxone for sacral wound and UTI completed 8/21.  Now with fever of 100.2 on 8/22 with increased WBC.  Blood culture sent with staph not aureus in 1 out of 2 sets.  Chest x-ray with no new focal opacity with persistent edema noted.  Goals of care  discussion ongoing related to poor appetite, pain control.     OBJECTIVE     /60 (BP Location: Right arm)   Pulse 60   Temp 96.8 °F (36 °C) (Temporal)   Resp 25   Wt 120 lb (54.4 kg)   SpO2 99%   BMI 20.60 kg/m²      Gen:                   Awake, weak appearing  HEENT:             EOMI, neck supple, 1L NC  CV/lungs:           RRR, CTAB  Abdom:              Soft, NT/ND, +BS  Skin/extrem:      No rashes, no c/c/e, hand contractures noted  :                    Primafit+  Lines:     Laboratory Data: Reviewed     Microbiology: Reviewed     Radiology: Reviewed     [facility]      8/24/2024 Pulmonology     Reason for Consultation:   Altered mental status     History of Present Illness:   Patient is a 79-year-old female with history of rheumatoid arthritis ischemic cardiomyopathy who presented with chief complaint of worsening decubitus ulcer.  Patient primarily bedbound.  Patient with excisional debridement of ulcer earlier during hospital course.  Worsening altered mental status noted prompting transfer to intensive care.  Tmax 101.6 degrees.  Unable to provide any meaningful history otherwise.      Assessment   1.  Sacral decubitus ulcer status post debridement  2.  Leukocytosis  3.  Toxic metabolic encephalopathy  4.  Hyponatremia  5.  Nonischemic cardiomyopathy  6.  History of paroxysmal atrial fibrillation  7.  Anemia  8.  Acute kidney injury on chronic kidney disease  9.  Fevers  10.  Staph aureus bacteremia     Plan   -Patient presented with evidence of worsening sacral wound status post debridement on 8/13/2024.  Cultures positive for E. coli.  Completed course of antibiotic therapy.  -Most recent blood cultures 1 of 2 positive on 8/22/2024 for Staph aureus.  Seen by ID concern for contaminant.  -Monitoring of IV antibiotic therapy at this time  -Hold off stress dose steroids at this time  -Monitor fever curve  -Wound care  -CT head with no acute findings  seen  -Close neuromonitoring  -Closely monitor renal function and urine output  -DVT prophylaxis: Heparin  -Patient DNR/DNI  -Reviewed vitals, labs and imaging     Augustin Parsons DO  Pulmonary Critical Care Medicine  [facility]  8/24/2024  8:07 PM     8/25/2024 Hospitalist   Subjective:  Pt was seen and examined.   Resting comfortably in bed, no overnight events reported by the nursing staff.               Objective:  Review of Systems:   ROS completed; pertinent positive and negatives stated in subjective.        Vital signs:  Temp:  [96.5 °F (35.8 °C)-97.2 °F (36.2 °C)] 97.2 °F (36.2 °C)  Pulse:  [60-62] 60  Resp:  [13-23] 15  BP: (103-132)/(54-74) 121/74  SpO2:  [95 %-100 %] 95 %        Physical Exam:    Gen: alert, oriented x1    Diagnostic Data:    Labs:           Recent Labs   Lab 08/20/24  0626 08/21/24  0555 08/22/24  0643 08/23/24  0426 08/24/24  0423 08/25/24  0435   WBC 8.3 8.5 11.9* 15.1* 25.5* 40.6*   HGB 8.1* 9.5* 8.4* 8.9* 8.2* 9.1*   MCV 95.2 97.8 93.8 97.7 97.5 96.1   .0* 671.0* 658.0* 565.0* 543.0* 436.0   BAND 1 2 4 5 2  --                Recent Labs   Lab 08/23/24  0426 08/24/24  0423 08/25/24  0730   GLU 80 87 135*   BUN 42* 50* 42*   CREATSERUM 1.58* 2.01* 1.90*   CA 9.1 9.1 9.0   ALB 3.2 3.2 3.2   * 135* 135*   K 4.4 4.7 4.9    105 107   CO2 21.0 21.0 18.0*   ALKPHO 154* 160* 168*   AST 15 14 12   ALT <7* <7* <7*   BILT 0.5 0.5 0.4   TP 6.6 6.5 6.6     Assessment & Plan:  GPC bacteremia  Leukocytosis  Sacral decubitus ulcer  Gsx on consult  Now s/p Excision of cyst, sharp excisional debridement of buttock decubitus ulcer POD #11  PRN pain control  Completed 10 days of abx  WBC 8.5 ->11.9 -> 15 -> 25.5  No fever curve, no obv source noted  Infectious workup negative so far  UA and repeat Bcx pending  ID on consult  Hold off of abx at this time  Repeat Bcx, follow previous cultures  Received Solumedrol IV x1  ICU on consult   Hold off stress dose steroids at this  time  Close neuromonitoring  AMS -> intermittent   Unclear etiology, consider metabolic encephalopathy  Neurology following, EEG WNL  Keppra resumed  CT head non-acute  ABG reviewed  ICU on consult   Hold off stress dose steroids at this time  Close neuromonitoring  Fall precautions  Hyponatremia  Na 129 -> 133 -> 135  Continue D5NS  Monitor labs  UTI  UA reviewed  Ucx pending -> e. coli  Continue IV abx - completed course of treatment  NICM, HFrEF  Hx of paroxysmal afib  Hx of BRANDYN occlusion  AV paced  Continue home meds  GEORGE on CKD 3 - stable  Monitor renal function  Avoid nephrotoxic agents  Anemia  Hgb stable today  Cont to monitor   Thrombocytosis  Monitor labs  Heparin s/c for DVT ppx  Nutrition  Dietitian on consult  Encouraged to eat more  Family reports the patient would not want a feeding tube  GOC  Palliative care following. Patient and family okay with CPC  Appreciate CM/SW placement     Plan of care discussed with RN at bedside         Goals of care: Palliative care has been consulted, discussions on going regarding DC plan, daughter will discuss with family before making a decision on Hospice care. Otherwise will dc with home palliative care.            Supplementary Documentation:  Quality:  DVT Prophylaxis: Heparin s/c      8/26/2024 Hospitalist Progress Note  Subjective:  Pt was seen and examined.   Resting comfortably in bed, no overnight events reported by the nursing staff.   Having a conversation. Son present at the bedside.  All questions and concerns addressed.               Objective:  Review of Systems:   ROS completed; pertinent positive and negatives stated in subjective.        Vital signs:  Temp:  [96.9 °F (36.1 °C)-97.5 °F (36.4 °C)] 97.4 °F (36.3 °C)  Pulse:  [59-61] 60  Resp:  [11-26] 17  BP: ()/(49-92) 133/92  SpO2:  [77 %-100 %] 95 %        Physical Exam:    Gen: alert, oriented x2-3  Chest: good air entry CTABL  CVS: normal s1 and s2 RR  Abd: NABS soft NT ND  Neuro: non-focal,  following commands  Skin: decub dressing CDI  Ext: no edema in bilateral LE        Diagnostic Data:    Labs:          Recent Labs   Lab 08/22/24  0643 08/23/24  0426 08/24/24  0423 08/25/24  0435 08/26/24  0407   WBC 11.9* 15.1* 25.5* 40.6* 48.1*   HGB 8.4* 8.9* 8.2* 9.1* 7.9*   MCV 93.8 97.7 97.5 96.1 95.1   .0* 565.0* 543.0* 436.0 438.0   BAND 4 5 2 5 3               Recent Labs   Lab 08/24/24  0423 08/25/24  0730 08/26/24  0324   GLU 87 135* 112*   BUN 50* 42* 52*   CREATSERUM 2.01* 1.90* 1.79*   CA 9.1 9.0 8.6*   ALB 3.2 3.2 3.1*   * 135* 139   K 4.7 4.9 4.3    107 112   CO2 21.0 18.0* 19.0*   ALKPHO 160* 168* 161*   AST 14 12 13   ALT <7* <7* <7*   BILT 0.5 0.4 0.3   TP 6.5 6.6 6.4         Estimated Creatinine Clearance: 22.2 mL/min (A) (based on SCr of 1.79 mg/dL (H)).     No results for input(s): \"PTP\", \"INR\" in the last 168 hours.           Imaging: Imaging data reviewed in Epic.     Medications:   Scheduled Medications    meropenem  500 mg Intravenous q12h    Vancomycin IV  1 each Intravenous See Admin Instructions (RX holding)    vancomycin  250 mg Oral QID    levETIRAcetam  500 mg Intravenous Daily    heparin  5,000 Units Subcutaneous Q8H BROOKLYNN    acetaminophen  650 mg Oral Q8H    patiromer  8.4 g Oral Once    sacubitril-valsartan  1 tablet Oral BID    bumetanide  1 mg Oral Daily    metoprolol succinate  25 mg Oral Daily Beta Blocker    midodrine  5 mg Oral TID    sodium hypochlorite   Topical BID    amiodarone  200 mg Oral Daily    fentaNYL  1 patch Transdermal Q72H    ferrous sulfate  325 mg Oral Daily with breakfast    folic acid  800 mcg Oral Daily    gabapentin  300 mg Oral TID    [Held by provider] isosorbide dinitrate  20 mg Oral TID (Nitrates)    pantoprazole  40 mg Oral BID AC                  Assessment & Plan:  GPC bacteremia  Worsening Leukocytosis  Sacral decubitus ulcer  Gsx on consult  Now s/p Excision of cyst, sharp excisional debridement of buttock decubitus ulcer  PRN  pain control  Completed 10 days of abx  WBC 8.5 ->11.9 -> 15 -> 25.5  No fever curve, no obv source noted  Infectious workup negative so far  UA and repeat Bcx pending  ID on consult  Repeat Bcx, follow previous cultures  Received Solumedrol IV x1  Remains on abx at this time  ICU on consult   Hold off stress dose steroids at this time  Close neuromonitoring  Currently on PO Vanc, IV Meropenem  AMS -> intermittent -> much improved  Unclear etiology, consider metabolic encephalopathy  Neurology following, EEG WNL  Keppra resumed  CT head non-acute  ABG reviewed  ICU on consult   Hold off stress dose steroids at this time  Close neuromonitoring  Fall precautions  Hyponatremia  Na 129 -> 133 -> 135 - 139  Continue D5NS  Monitor labs  UTI  UA reviewed  Ucx pending -> e. coli  Continue IV abx - completed course of treatment  NICM, HFrEF  Hx of paroxysmal afib  Hx of BRANDYN occlusion  AV paced  Continue home meds  GEORGE on CKD 3 - stable  Monitor renal function  Avoid nephrotoxic agents  Anemia  Hgb stable today  Cont to monitor   Thrombocytosis  Monitor labs  Heparin s/c for DVT ppx  Nutrition  Dietitian on consult  Family reports the patient would not want a feeding tube  IVF discontinued, encourage to eat more  GOC  Palliative care following. Patient and family okay with CPC  Appreciate CM/SW placement     Plan of care discussed with RN at bedside         Goals of care: Palliative care has been consulted, discussions on going regarding DC plan, daughter will discuss with family before making a decision on Hospice care. Otherwise will dc with home palliative care.            Supplementary Documentation:  Quality:  DVT Prophylaxis: Heparin s/c  CODE status: DNAR/Select        Estimated date of discharge: TBD  Discharge is dependent on: clinical stability      8/26/2024 Infectious Disease Progress Note   Antibiotics: Vancomycin, meropenem;  (IV ceftriaxone 8/12-8/21)     # Fever-better  # Staph epidermidis bacteremia in 1/2  sets on 8/22/24 - likely contaminant               - has bioprosthetic MVR and L chest PPM               -TTE without vegetation     # Leukocytosis - suspect reactive; r/o bacteremia; repeat cx NGTD  -Also got 1 dose of solumedrol  # GEORGE on CKD  # Anemia s/p transfusion 8/17     # Sacral wound stage 2 s/p OR debridement 8/13/24 - cx E.coli, anaerobes - currently no signs of infection     # Asymptomatic bacteruria - E. coli  # Intermittent AMS     # RA, bedbound, previously on MTX and prednisone               - received prednisone at last discharge; currently off related to limited PO     PLAN:  -  Continue on vancomycin and meropenem. Check CT C/A/P.   -  Follow fever curve, wbc.   -  Reviewed labs, micro, imaging reports.  -  Case d/w patient, RN.     History of Present Illness:  79-year-old female with a history of severe nonischemic cardiomyopathy with multiple admissions for heart failure, A-fib, CKD, severe RA with multiple joints involved was recently discharged about 1 month prior.  During that admission had a large left pleural effusion on dobutamine, Bumex, thoracentesis, left and right heart cath showing normal coronaries but severe volume overload.  Also had GEORGE and leukopenia.  Now admitted on 8/12 with painful decubitus ulcer.  Patient is bedbound with severe RA on methotrexate and prednisone.  Afebrile, 2 L nasal cannula.  Seen by general surgery and taken the OR on 8/13 status post excision of cyst and sharp debridement of the decubitus ulcer.  Cultures with E. coli and anaerobes.  IV ceftriaxone for sacral wound and UTI completed 8/21.  Now with fever of 100.2 on 8/22 with increased WBC.  Blood culture sent with staph not aureus in 1 out of 2 sets.  Chest x-ray with no new focal opacity with persistent edema noted.  Goals of care discussion ongoing related to poor appetite, pain control.     OBJECTIVE     /85 (BP Location: Radial)   Pulse 60   Temp 97.6 °F (36.4 °C) (Temporal)   Resp 17    Wt 120 lb (54.4 kg)   SpO2 93%   BMI 20.60 kg/m²      Gen:                   Awake, in bed  HEENT:             EOMI, neck supple, 1L NC  CV/lungs:           RRR, CTAB  Abdom:              Soft, no TTP  Skin/extrem:      No rashes, no c/c/e, hand contractures noted  :                    Purewick+  Lines:                 PIV+     Laboratory Data: Reviewed     Microbiology: Reviewed     Radiology: Reviewed     [facility]    Medications 08/18 08/19 08/20 08/21 08/22 08/23 08/24 08/25 08/26 08/27   acetaminophen (Ofirmev) 10 mg/mL infusion premix 1,000 mg  Dose: 1,000 mg  Freq: Once Route: IV  Last Dose: 1,000 mg (08/23/24 2208)  Start: 08/23/24 2200 End: 08/23/24 2223   Order specific questions:            2208 ZN-New Bag            acetaminophen (Tylenol) 160 MG/5ML oral liquid 650 mg  Dose: 650 mg  Freq: Every 8 hours Route: OR  Start: 08/19/24 1500     1748 SK-Given      (0100 AS)-Not Given [C]     (0900 BB)-Not Given     1650 BB-Given      (0100 AS)-Not Given     (0900 BB)-Not Given     1808 BB-Given      0007 AS-Given     (0900 EH)-Not Given     (1700 EH)-Not Given      (0100 RP)-Not Given     (0900 BB)-Not Given [C]     (1700 BB)-Not Given      0139 GV-Given [C]     (0900 JG)-Not Given     (1700 JG)-Not Given      (0100 KF)-Not Given          (1700 LG)-Not Given      (0100 NH)-Not Given     0840 MG-Given     (1700 MG)-Not Given      (0100 NH)-Not Given     (0948 LP)-Not Given     1700        amiodarone (Pacerone) tab 200 mg  Dose: 200 mg  Freq: Daily Route: OR  Start: 08/12/24 1945   Admin Instructions:   **Hold if SBP is less than 90 &/or heart rate less than 60    (0930 AJ)-Not Given [C]      (0930 SK)-Not Given      (0930 BB)-Not Given      1353 BB-Given      (0930 EH)-Not Given      1040 BB-Given      1122 JG-Given      0839 KH-Given      0841 MG-Given      0949 LP-Given        bumetanide (Bumex) 0.25 MG/ML injection 1 mg  Dose: 1 mg  Freq: Once Route:  IV  Start: 08/14/24 1445 End: 08/16/24 1045                bumetanide (Bumex) tab 1 mg  Dose: 1 mg  Freq: Daily Route: OR  Start: 08/17/24 0930   Admin Instructions:   Hold for SBP <100    0859 AJ-Given      (0930 SK)-Not Given      (0930 BB)-Not Given      1354 BB-Given      (0930 EH)-Not Given      1040 BB-Given      1123 JG-Given      0839 KH-Given      0842 MG-Given      0949 LP-Given        bumetanide (Bumex) tab 1 mg  Dose: 1 mg  Freq: 2 times daily (diuretic) Route: OR  Start: 08/15/24 0900 End: 08/17/24 0642   Admin Instructions:   Hold for SBP <100                bumetanide (Bumex) tab 1 mg  Dose: 1 mg  Freq: Daily Route: OR  Start: 08/12/24 1945 End: 08/14/24 1436                carvedilol (Coreg) tab 3.125 mg  Dose: 3.125 mg  Freq: 2 times daily with meals Route: OR  Start: 08/12/24 1945 End: 08/16/24 1027                cefTRIAXone (Rocephin) 1 g in sodium chloride 0.9% 100 mL IVPB-ADDV  Dose: 1 g  Freq: Every 24 hours Route: IV  Last Dose: 1 g (08/21/24 1344)  Start: 08/13/24 1400 End: 08/22/24 1059   Admin Instructions:   Ceftriaxone must NOT be administered simultaneously with calcium containing IV solutions. Includes Y-site as well.  In patients other than neonates ceftriaxone and calcium containing products may administered sequentially, provided the line is flushed in between administrations.   Order specific questions:       1419 AJ-New Bag      1357 SK-New Bag      1345 BB-New Bag      1344 BB-New Bag      1059-D/C'd           cefTRIAXone (Rocephin) 2 g in sodium chloride 0.9% 100 mL IVPB-ADDV  Dose: 2 g  Freq: Once Route: IV  Last Dose: 2 g (08/12/24 1357)  Start: 08/12/24 1323 End: 08/12/24 1427   Admin Instructions:   Ceftriaxone must NOT be administered simultaneously with calcium containing IV solutions. Includes Y-site as well.  In patients other than neonates ceftriaxone and calcium containing products may administered sequentially, provided the line is flushed in between administrations.    Order specific questions:                   cyclobenzaprine (Flexeril) tab 10 mg  Dose: 10 mg  Freq: Nightly Route: OR  Start: 08/12/24 2100 End: 08/20/24 1114    (2020 ZN)-Not Given      (2100 AS)-Not Given [C]      1114-D/C'd             fentaNYL (Duragesic) 12 MCG/HR patch 1 patch  Dose: 1 patch  Freq: Every 72 hours Route: TD  Start: 08/12/24 1945   Admin Instructions:   Scan barcode on front of package  Apply patch to chest, back, flank, or upper arm.    0757 ZN/AJ-Fentanyl Patch Location Verified [C]      1433 SK/TR-Fentanyl Patch Removed     1435 SK/TR-Fentanyl Patch Applied [C]       0740 AS/BB-Fentanyl Patch Location Verified      0955 EH/SK-Fentanyl Patch Applied     0956 EH/SK-Fentanyl Patch Removed        (0900 KH)-Not Given [C]     0952 KH/HS-Fentanyl Patch Removed          ferrous sulfate DR tab 325 mg  Dose: 325 mg  Freq: Daily with breakfast Route: OR  Start: 08/13/24 0800   Admin Instructions:   Do not crush    0900 AJ-Given      0909 SK-Given      (0800 BB)-Not Given      0800 BB-Given      (0800 EH)-Not Given      (1400 BB)-Not Given      1137 JG-Given      0839 KH-Given      0842 MG-Given      0950 LP-Given        folic acid (Folvite) tab 800 mcg  Dose: 800 mcg  Freq: Daily Route: OR  Start: 08/12/24 1945    0905 AJ-Given      (0930 SK)-Not Given      (0930 BB)-Not Given      0930 BB-Given      (0930 EH)-Not Given      1040 BB-Given      1122 JG-Given      0839 KH-Given      0841 MG-Given      0949 LP-Given        gabapentin (Neurontin) cap 300 mg  Dose: 300 mg  Freq: 3 times daily Route: OR  Start: 08/12/24 2100    (0900 AJ)-Not Given [C]     1557 AJ-Given     (2020 ZN)-Not Given      0909 SK-Given     (1600 SK)-Not Given     2042 AS-Given      (0900 BB)-Not Given     (1600 BB)-Not Given     (2100 AS)-Not Given [C]      1353 BB-Given     (1600 BB)-Not Given [C]     2113 AS-Given      (0900 EH)-Not Given     (1600 EH)-Not Given     (2100 RP)-Not Given      1040 BB-Given     (1600 BB)-Not  Given     (2103 ZN)-Not Given      1137 JG-Given     1751 JG-Given     2051 KF-Given      0900 KH-Given     1715 LG-Given     2001 NH-Given      0842 MG-Given     1637 MG-Given     2019 NH-Given      0949 LP-Given     1600     2100        heparin (Porcine) 5000 UNIT/ML injection 5,000 Units  Dose: 5,000 Units  Freq: Every 8 hours scheduled Route: SC  Start: 08/21/24 1400       1541 BB-Given     2113 AS-Given      0622 AS-Given     1415 EH-Given     (2200 RP)-Not Given      0539 RP-Given     (1300 BB)-Not Given     (1400 BB)-Not Given     2104 ZN-Given      0514 GV-Given     1546 JG-Given     2107 KF-Given      0605 KF-Given     1400 LG-Given     2244 NH-Given      0626 NH-Given     1325 MG-Given     2151 NH-Given      0552 NH-Given     1400     2200        HYDROmorphone (Dilaudid) 1 MG/ML injection 0.2 mg  Dose: 0.2 mg  Freq: Once Route: IV  Start: 08/22/24 1230 End: 08/22/24 1227        1227 EH-Given             isosorbide dinitrate (Isordil) tab 20 mg  Dose: 20 mg  Freq: 3 times daily @ 0800, 1300, 1800 Route: OR  Start: 08/12/24 1945   Admin Instructions:   Do not administer around the clock; allow nitrate-free interval of at least 14 hours.    0800 TP     1300 TP     1800 TP      0800 TP     1300 TP     1800 TP      0800 TP     1300 TP     1800 TP      0800 TP     1300 TP     1800 TP      0800     1300     1800      0800     1300     1800      0800     1300     1800      0800     (1300 LG)-Not Given     (1800 LG)-Not Given      0800     1300     1800      0800     1300     1800        levETIRAcetam (Keppra) 500 mg/5mL injection 500 mg  Dose: 500 mg  Freq: Daily Route: IV  Start: 08/24/24 0000   Admin Instructions:   Administer slow IV push over 2 minutes          0034 TD-Given     1122 JG-Given      0839 KH-Given      0841 MG-Given      0949 LP-Given        levETIRAcetam (Keppra) 500 mg/5mL injection 500 mg  Dose: 500 mg  Freq: Every 24 hours Route: IV  Start: 08/15/24 1415 End: 08/23/24 1658   Admin  Instructions:   Administer slow IV push over 2 minutes    1415 AJ-Given      1757 SK-Given      1348 BB-Given      1533 BB-Given      1415 EH-Given      1658-D/C'd          levETIRAcetam (Keppra) tab 500 mg  Dose: 500 mg  Freq: Daily Route: OR  Start: 08/23/24 1700 End: 08/23/24 2358   Admin Instructions:   If patient cannot swallow whole, crush tablet and dissolve in 10 mL of water. Shake for 5 minutes to dissolve, and then administer immediately         (1700 BB)-Not Given     2358-D/C'd          magnesium sulfate in sterile water for injection 2 g/50mL IVPB premix 2 g  Dose: 2 g  Freq: Once Route: IV  Last Dose: 2 g (08/16/24 1543)  Start: 08/16/24 0745 End: 08/16/24 1643                meropenem (Merrem) 500 mg in sodium chloride 0.9% 100 mL IVPB-MBP  Dose: 500 mg  Freq: Every 12 hours Route: IV  Last Dose: 500 mg (08/27/24 0949)  Start: 08/25/24 1000 End: 09/01/24 0959   Order specific questions:              1113 KH-New Bag [C]     2242 NH-New Bag      0900 MG-New Bag     2151 NH-New Bag      0949 LP-New Bag     2200        methylPREDNISolone sodium succinate (Solu-MEDROL) injection 40 mg  Dose: 40 mg  Freq: Every 12 hours Route: IV  Start: 08/24/24 1130 End: 08/24/24 1143   Admin Instructions:   Reconstitute with 1ml sterile water or saline          1137 JG-Given     1143-D/C'd         metoprolol succinate ER (Toprol XL) 24 hr tab 25 mg  Dose: 25 mg  Freq: Daily Beta Blocker Route: OR  Start: 08/16/24 1030   Admin Instructions:   Hold for SBP <90  Do not crush    0618 ZN-Given      0553 ZN-Hold      0543 AS-Given      (0600 AS)-Not Given      0622 AS-Given      (0600 RP)-Not Given      0514 GV-Given      0605 KF-Given      0627 NH-Given      0552 NH-Given        midodrine (ProAmatine) tab 5 mg  Dose: 5 mg  Freq: 3 times daily Route: OR  Start: 08/16/24 1200   Admin Instructions:   Not recommended to be administered within 4 hours of bedtime    0618 ZN-Given     (7271 AJ)-Not Given     1756 AJ-Given      0582  ZN-Hold     (1200 SK)-Not Given     (1700 SK)-Not Given      0543 AS-Given     (1200 BB)-Not Given [C]     (1700 BB)-Not Given      (0700 AS)-Not Given     1355 BB-Given     1809 BB-Given      0622 AS-Given     (1200 EH)-Not Given     (1700 EH)-Not Given      0539 RP-Given     1040 BB-Given     (1700 BB)-Not Given      0514 GV-Given     1122 JG-Given     1750 JG-Given      0605 KF-Given     (1200 LG)-Not Given     (1700 LG)-Not Given      0627 NH-Given     1213 MG-Given     1637 MG-Given      0646 NH-Given     1200     1700        midodrine (ProAmatine) tab 5 mg  Dose: 5 mg  Freq: Once Route: OR  Start: 08/15/24 2100 End: 08/15/24 2113   Admin Instructions:   Not recommended to be administered within 4 hours of bedtime                midodrine (ProAmatine) tab 5 mg  Dose: 5 mg  Freq: 2 times daily before meals Route: OR  Start: 08/13/24 0700 End: 08/16/24 1055   Admin Instructions:   Not recommended to be administered within 4 hours of bedtime                pantoprazole (Protonix) DR tab 40 mg  Dose: 40 mg  Freq: 2 times daily before meals Route: OR  Start: 08/13/24 0700   Admin Instructions:   Do not crush    0618 ZN-Given     1756 AJ-Given      0553 ZN-Hold     1745 SK-Given      0543 AS-Given     (1700 BB)-Not Given      1357 BB-Given     1809 BB-Given      0622 AS-Given     (1700 EH)-Not Given      0539 RP-Given     (1700 BB)-Not Given      0513 GV-Given     1750 JG-Given      0605 KF-Given     1711 LG-Given      0627 NH-Given     1637 MG-Given      0645 NH-Given     1700        patiromer (Veltassa) 8.4 g oral packet 8.4 g  Dose: 8.4 g  Freq: Once Route: OR  Start: 08/19/24 1600   Admin Instructions:   Add to 1/3 cup of water.  Stir thoroughly and drink immediately.  Separate from all other medications by at least 3 hours.     (1600 SK)-Not Given                predniSONE (Deltasone) tab 20 mg  Dose: 20 mg  Freq: Daily with breakfast Route: OR  Start: 08/24/24 0815 End: 08/24/24 1126          (0815 JG)-Not  Given [C]     1126-D/C'd         predniSONE (Deltasone) tab 20 mg  Dose: 20 mg  Freq: Daily with breakfast Route: OR  Start: 08/24/24 0800 End: 08/24/24 0810               0810-D/C'd         sacubitril-valsartan (Entresto) 24-26 MG per tab 1 tablet  Dose: 1 tablet  Freq: 2 times daily Route: OR  Start: 08/17/24 0900   Admin Instructions:   Hold for SBP <100    0859 AJ-Given     (2020 ZN)-Not Given [C]      0909 SK-Given     (2100 AS)-Not Given [C]      (0900 BB)-Not Given     (2100 AS)-Not Given [C]      1358 BB-Given     2113 AS-Given      (0900 EH)-Not Given     (2100 RP)-Not Given      1047 BB-Given     (2103 ZN)-Not Given      (0900 JG)-Not Given     2051 KF-Given      0839 KH-Given     2001 NH-Given      0841 MG-Given     2019 NH-Given      0949 LP-Given     2100        sacubitril-valsartan (Entresto) 49-51 MG per tab 1 tablet  Dose: 1 tablet  Freq: 2 times daily Route: OR  Start: 08/12/24 2100 End: 08/17/24 0642                sodium chloride 0.9 % IV bolus 250 mL  Dose: 250 mL  Freq: Once Route: IV  Last Dose: 250 mL (08/14/24 0640)  Start: 08/14/24 0530 End: 08/14/24 0840                sodium chloride 0.9 % IV bolus 500 mL  Dose: 500 mL  Freq: Once Route: IV  Last Dose: 500 mL (08/15/24 1949)  Start: 08/15/24 2000 End: 08/15/24 2049                sodium chloride 0.9 % IV bolus 500 mL  Dose: 500 mL  Freq: Once Route: IV  Last Dose: 500 mL (08/13/24 1326)  Start: 08/13/24 1315 End: 08/13/24 1426                sodium chloride 0.9% infusion  Freq: Once Route: IV  Start: 08/16/24 0715 End: 08/16/24 1100   Admin Instructions:   To standard blood administration set with integral filter. Change every 4 hours or after 2 units, whichever comes first. ALERT: NEVER mix any medication or solution with blood or blood products.  Run at KVO rate                sodium hypochlorite (Dakin's) 0.125 % external solution  Freq: 2 times daily Route: TOP  Start: 08/12/24 2100   Admin Instructions:   See Wound Care Dressing  Instructions   Order specific questions:       1030 AJ-Given     2027 ZN-Given      1030 SK-Given     2043 AS-Given      (0900 BB)-Not Given      0000 AS-Given     1344 BB-Given     2113 AS-Given      1131 EH-Given     (2313 RP)-Not Given      1047 BB-Given     2107 ZN-Given      1139 JG-Given     2108 KF-Given      1400 LG-Given     2002 NH-Given [C]      0840 MG-Given     2156 NH-Given [C]      0955 LP-Given     2100        spironolactone (Aldactone) tab 25 mg  Dose: 25 mg  Freq: Daily Route: OR  Start: 08/13/24 0930 End: 08/13/24 0925   Admin Instructions:   CAUTION: HAZARDOUS DRUG                vancomycin (Vancocin) 750 mg in sodium chloride 0.9% 250 mL IVPB-ADDV  Dose: 15 mg/kg  Weight Dosing Info: 54.4 kg  Freq: Once Route: IV  Last Dose: 750 mg (08/25/24 1113)  Start: 08/25/24 1100 End: 08/25/24 1213   Admin Instructions:   Vancomycin concentrations >/= 5 mg/mL are incompatible with Zosyn and must be run separately   Order specific questions:              1113 KH-New Bag          vancomycin (Vancocin) cap 125 mg  Dose: 125 mg  Freq: Every 12 hours Route: OR  Start: 08/27/24 0500   Order specific questions:                0552 NH-Given     1700        vancomycin (Vancocin) cap 250 mg  Dose: 250 mg  Freq: 4 times daily Route: OR  Start: 08/24/24 1700 End: 08/26/24 1742   Order specific questions:             1751 JG-Given     2051 KF-Given      0839 KH-Given     1234 LG-Given     1711 LG-Given     2001 NH-Given      0842 MG-Given     1213 MG-Given     1637 MG-Given     1742-D/C'd       Vancomycin: PHARMACY DOSING  Dose: 1 each  Freq: SEE ADMIN INSTRUCTIONS (PHARMACY HOLDING) Route: IV  Start: 08/25/24 1001 End: 08/26/24 1741   Admin Instructions:   This is a placeholder order for pharmacy to dose vancomycin; no medication should be administered via this order. Doses of vancomycin to be administered will be ordered separately by pharmacy as appropriate. For further clarification, please contact your pharmacist.             1741-D/C'd             User Information   Initial Name Initial Name   AJ Ibeth Zamarripa RN  [[id number]] AS Kari Higgins RN  [[id number]]   BB Keyana Zhang RN  [id number]] EH Oksana Gregory RN  [id number]]   GV Perry Alex RN  [[id number]] HS Cristina Brown RN  [id number]]   JJames Camejo RN  [[id number]] KF Cindy Ronquillo RN  [id number]]   KH Sydney Myrick RN  [[id number]] LG Sterling Fraser RN  [[id number]]   LP Christine Street RN  [[id number]] MG Tierney Soria RN  [id number]]   NH Beatrice Snow RN  [[id number]] RP Black Khan RN  [id number]]   SK Marguerite Penaloza RN  [[id number]] TD Tracy Soliz RN  [[id number]]   TP Carole Shaw APRN  [[id number]] TR Reyes, Taina, RN  [[id number]]   ZN Jesús Diallo RN  [[id number]]           Continuous Meds Sorted by Name  for Margaret Silverio as of 08/18/24 through 8/27/24    1 Day 3 Days 7 Days 10 Days < Today >   Legend:       Medications 08/18 08/19 08/20 08/21 08/22 08/23 08/24 08/25 08/26 08/27   dextrose 5% infusion  Rate: 50 mL/hr  Freq: Continuous Route: IV  Start: 08/19/24 1845 End: 08/22/24 1059     1845 SK-New Bag      1428 BB-New Bag      1531 BB-New Bag      1009 EH-New Bag     1059-D/C'd           dextrose 5%-sodium chloride 0.9% infusion  Rate: 75 mL/hr  Freq: Continuous Route: IV  Start: 08/27/24 1000             1010 LP-New Bag        dextrose 5%-sodium chloride 0.9% infusion  Rate: 50 mL/hr  Freq: Continuous Route: IV  Last Dose: Stopped (08/26/24 1218)  Start: 08/22/24 1100 End: 08/26/24 1217        1128 EH-New Bag         1217-D/C'd  1218 MG-Stopped         lactated ringers infusion  Rate: 100 mL/hr  Freq: Continuous Route: IV  Last Dose: Stopped (08/13/24 1100)  Start: 08/13/24 1100 End: 08/13/24 1221                sodium chloride 0.9 % irrigation  Freq: Continuous PRN  Start: 08/13/24 1039 End: 08/13/24 1221                      User Information   Initial Name Initial Name   BB Keyana Zhang RN  [[id number]] Oksana Soto RN  [id number]]   LATISHA Street,  Christine NAIK RN  [[id number]] MG Tierney Soria RN  [[id number]]   SK Marguerite Penaloza RN  [[id number]]           PRN Meds Sorted by Name  for Margaret Silverio as of 08/18/24 through 8/27/24    1 Day 3 Days 7 Days 10 Days < Today >   Legend:         Medications 08/18 08/19 08/20 08/21 08/22 08/23 08/24 08/25 08/26 08/27   acetaminophen (Ofirmev) 10 mg/mL infusion premix 1,000 mg  Dose: 1,000 mg  Freq: Every 6 hours PRN Route: IV  PRN Reason: Pain  Last Dose: 1,000 mg (08/19/24 2042)  Start: 08/15/24 1512 End: 08/23/24 1006   Order specific questions:       1140 AJ-New Bag      2042 AS-New Bag         1006-D/C'd          acetaminophen (Tylenol Extra Strength) tab 500 mg  Dose: 500 mg  Freq: Every 4 hours PRN Route: OR  PRN Reasons: Fever,mild pain  PRN Comment: equal to or greater than 100.4  Start: 08/12/24 1857   Admin Instructions:   do not initiate oral therapy until 6-8 hours after the last IV acetaminophen dose if IV acetaminophen was used previously           0948 KH-Given          acetaminophen (Tylenol Extra Strength) tab 500 mg  Dose: 500 mg  Freq: Every 4 hours PRN Route: OR  PRN Reason: Fever  PRN Comment: equal to or greater than 100.4  Start: 08/12/24 1451 End: 08/12/24 1858   Admin Instructions:   do not initiate oral therapy until 6-8 hours after the last IV acetaminophen dose if IV acetaminophen was used previously                atropine 0.1 MG/ML injection 0.5 mg  Dose: 0.5 mg  Freq: As needed Route: IV  PRN Comment: for symptomatic bradycardia/pre-emergent situation  Start: 08/13/24 1046 End: 08/13/24 1221   Admin Instructions:   For PACU/Phase II Use ONLY.                 glucose (Dex4) 15 GM/59ML oral liquid 15 g  Dose: 15 g  Freq: Every 15 min PRN Route: OR  PRN Reason: Low blood glucose  PRN Comment: less than 70 mg/dL, OR blood glucose  mg/dL with symptoms of hypoglycemia  Start: 08/18/24 1024   Admin Instructions:   Use PRN reason as a guide and follow hypoglycemia policy     5954 SK-Given                                     Or   glucose (Glutose) 40% oral gel 15 g  Dose: 15 g  Freq: Every 15 min PRN Route: OR  PRN Reason: Low blood glucose  PRN Comment: less than 70 mg/dL, OR blood glucose  mg/dL with symptoms of hypoglycemia  Start: 08/18/24 1024   Admin Instructions:   Use PRN reason as a guide and follow hypoglycemia policy                                         Or   glucose-vitamin C (Dex-4) chewable tab 4 tablet  Dose: 4 tablet  Freq: Every 15 min PRN Route: OR  PRN Reason: Low blood glucose  PRN Comment: less than 70 mg/dL, OR blood glucose  mg/dL with symptoms of hypoglycemia  Start: 08/18/24 1024   Admin Instructions:   Use PRN reason as a guide and follow hypoglycemia policy.                                         Or   dextrose 50% injection 50 mL  Dose: 50 mL  Freq: Every 15 min PRN Route: IV  PRN Reason: Low blood glucose  PRN Comment: less than 70 mg/dL, and patient NPO or altered consciousness  Start: 08/18/24 1024   Admin Instructions:   Administer slowly over 2-3 minutes.    Use PRN reason as a guide and follow hypoglycemia policy             0634 AS-Given     1050 EH-Given     1131 EH-Given          0009 NH-Given        Or   glucose (Dex4) 15 GM/59ML oral liquid 30 g  Dose: 30 g  Freq: Every 15 min PRN Route: OR  PRN Reason: Low blood glucose  PRN Comment: less than 54 mg/dL  Start: 08/18/24 1024   Admin Instructions:   Use PRN reason as a guide and follow hypoglycemia policy                                         Or   glucose (Glutose) 40% oral gel 30 g  Dose: 30 g  Freq: Every 15 min PRN Route: OR  PRN Reason: Low blood glucose  PRN Comment: less than 54 mg/dL  Start: 08/18/24 1024   Admin Instructions:   Use PRN reason as a guide and follow hypoglycemia policy                                         Or   glucose-vitamin C (Dex-4) chewable tab 8 tablet  Dose: 8 tablet  Freq: Every 15 min PRN Route: OR  PRN Reason: Low blood glucose  PRN Comment: less than 54  mg/dL  Start: 08/18/24 1024   Admin Instructions:   Use PRN reason as a guide and follow hypoglycemia policy.                                         fentaNYL (Sublimaze) 50 mcg/mL injection 25 mcg  Dose: 25 mcg  Freq: Every 5 min PRN Route: IV  PRN Comment: for pain score 1-6, up to maximum dose of 100 mcg  Start: 08/13/24 1046 End: 08/13/24 1221   Admin Instructions:   For PACU/Phase II Use ONLY.  If no relief after a maximum of 100mcg, proceed to Dilaudid                fentaNYL (Sublimaze) 50 mcg/mL injection 50 mcg  Dose: 50 mcg  Freq: Every 5 min PRN Route: IV  PRN Comment: for pain score 7-10, up to a maximum of 100 mcg  Start: 08/13/24 1046 End: 08/13/24 1221   Admin Instructions:   For PACU/Phase II Use ONLY.  If no relief after a maximum of 100mcg, proceed to Dilaudid                HYDROcodone-acetaminophen (Norco)  MG per tab 1 tablet  Dose: 1 tablet  Freq: Every 4 hours PRN Route: OR  PRN Reason: moderate pain  PRN Comment: hold for drowsiness.  Start: 08/23/24 1005         1040 BB-Given      2124 KF-Given      1217 LG-Given     2001 NH-Given      0702 NH-Given     1213 MG-Given     1637 MG-Given      0214 NH-Given     1001 LP-Given        HYDROcodone-acetaminophen (Norco)  MG per tab 1 tablet  Dose: 1 tablet  Freq: Every 8 hours PRN Route: OR  PRN Reason: moderate pain  Start: 08/12/24 1942 End: 08/23/24 1006       1350 BB-Given     2238 AS-Given       0539 RP-Given     1006-D/C'd           HYDROmorphone (Dilaudid) 1 MG/ML injection 0.2 mg  Dose: 0.2 mg  Freq: Every 4 hours PRN Route: IV  PRN Reasons: moderate pain,severe pain  Start: 08/20/24 1058   Admin Instructions:   Use PRN reason as a guide and follow range order policy. If oral pain meds are ordered and patient can tolerate oral intake, start with PRN oral pain medications first.      1737 BB-Given     2305 AS-Given by Other       0819 EH-Given     1128 EH-Given          (1758 EH)-Not Given [C]       0241 GV-Given     1119  JG-Given      2249 NH-Given      0917 MG-Given     2022 NH-Given          HYDROmorphone (Dilaudid) 1 MG/ML injection 0.2 mg  Dose: 0.2 mg  Freq: Every 3 hours PRN Route: IV  PRN Reasons: moderate pain,severe pain  Start: 08/19/24 1449 End: 08/20/24 1059   Admin Instructions:   Use PRN reason as a guide and follow range order policy. If oral pain meds are ordered and patient can tolerate oral intake, start with PRN oral pain medications first.                1059-D/C'd             Or   HYDROmorphone (Dilaudid) 1 MG/ML injection 0.4 mg  Dose: 0.4 mg  Freq: Every 3 hours PRN Route: IV  PRN Reasons: moderate pain,severe pain  Start: 08/19/24 1449 End: 08/20/24 1059   Admin Instructions:   Use PRN reason as a guide and follow range order policy. If oral pain meds are ordered and patient can tolerate oral intake, start with PRN oral pain medications first.      0046 AS-Given     0404 AS-Given     1059-D/C'd             Or   HYDROmorphone (Dilaudid) 1 MG/ML injection 0.8 mg  Dose: 0.8 mg  Freq: Every 3 hours PRN Route: IV  PRN Reason: severe pain  Start: 08/19/24 1449 End: 08/20/24 1059   Admin Instructions:   Use PRN reason as a guide and follow range order policy. If oral pain meds are ordered and patient can tolerate oral intake, start with PRN oral pain medications first.                1059-D/C'd             HYDROmorphone (Dilaudid) 1 MG/ML injection 0.2 mg  Dose: 0.2 mg  Freq: Every 5 min PRN Route: IV  PRN Comment: for pain score 1-3 up to a maximum of 3 mg  Start: 08/13/24 1046 End: 08/13/24 1221   Admin Instructions:   For PACU/Phase II Use ONLY.  If no relief after reaching maximum dose (3 mg), proceed to Morphine administration                HYDROmorphone (Dilaudid) 1 MG/ML injection 0.4 mg  Dose: 0.4 mg  Freq: Every 5 min PRN Route: IV  PRN Comment: for pain score 4-6 up to a maximum of 3 mg  Start: 08/13/24 1046 End: 08/13/24 1221   Admin Instructions:   For PACU/Phase II Use ONLY.  If no relief after  reaching maximum dose (3 mg), proceed to Morphine administration                HYDROmorphone (Dilaudid) 1 MG/ML injection 0.6 mg  Dose: 0.6 mg  Freq: Every 5 min PRN Route: IV  PRN Comment: for pain score 7-10 up to a maximum of 3 mg  Start: 08/13/24 1046 End: 08/13/24 1221   Admin Instructions:   For PACU/Phase II Use ONLY.  If no relief after reaching maximum dose (3 mg), proceed to Morphine administration                lactated ringers IV bolus 500 mL  Dose: 500 mL  Freq: Once as needed Route: IV  PRN Comment: For SBP<100, HR>100, lightheadedness and/or post op nausea/vomiting  Start: 08/13/24 1046 End: 08/13/24 1221   Admin Instructions:   For non-dialysis and non-congestive heart failure patients.  May repeat x1.  Notify anesthesia if symptoms are unresolved.                metoclopramide (Reglan) 5 mg/mL injection 5 mg  Dose: 5 mg  Freq: Every 8 hours PRN Route: IV  PRN Reasons: Nausea,vomiting  Start: 08/12/24 1451   Admin Instructions:   Default antiemetic sequence (unless otherwise preferred by patient):  1. ondansetron (Zofran) 2. metoclopramide (Reglan)  Wait 15 minutes before proceeding to next medication in sequence.  Follow therapeutic duplication policy                morphINE PF 10 MG/ML injection 6 mg  Dose: 6 mg  Freq: Every 10 min PRN Route: IV  PRN Comment: for pain 7-10, maximum dose of 10mg  Start: 08/13/24 1046 End: 08/13/24 1221   Admin Instructions:   For PACU/Phase II Use ONLY.                morphINE PF 2 MG/ML injection 1 mg  Dose: 1 mg  Freq: Every 3 hours PRN Route: IV  PRN Reason: mild pain  Start: 08/13/24 2117 End: 08/19/24 1449     0951 SK-[place]     1449-D/C'd              morphINE PF 2 MG/ML injection 1 mg  Dose: 1 mg  Freq: Every 4 hours PRN Route: IV  PRN Reason: mild pain  Start: 08/13/24 0510 End: 08/13/24 2118                morphINE PF 4 MG/ML injection 2 mg  Dose: 2 mg  Freq: Every 10 min PRN Route: IV  PRN Comment: for pain 1-3, maximum dose of 10mg  Start: 08/13/24 1045  End: 08/13/24 1221   Admin Instructions:   For PACU/Phase II Use ONLY.                morphINE PF 4 MG/ML injection 4 mg  Dose: 4 mg  Freq: Every 10 min PRN Route: IV  PRN Comment: for pain 4-6, maximum dose of 10mg  Start: 08/13/24 1046 End: 08/13/24 1221   Admin Instructions:   For PACU/Phase II Use ONLY.                naloxone (Narcan) 0.4 MG/ML injection 0.08 mg  Dose: 0.08 mg  Freq: As needed Route: IV  PRN Reason: Opioid reversal  PRN Comment: for one dose as needed if obtunded or unarousable and call Anesthesia  Start: 08/13/24 1046 End: 08/13/24 1221   Admin Instructions:   Dilute 1 vial of 0.4 mg/mL naloxone (Narcan) with 9 mL normal saline for a total volume of 10 mL (1mL = 0.04mg).                Naloxone HCl LIQD 4 mg  Dose: 4 mg  Freq: As needed Route: NA  PRN Comment: opitaes overdose  Start: 08/12/24 1943 End: 08/12/24 1959                ondansetron (Zofran) 4 MG/2ML injection 4 mg  Dose: 4 mg  Freq: Every 6 hours PRN Route: IV  PRN Reasons: Nausea,vomiting  Start: 08/12/24 1451   Admin Instructions:   Default antiemetic sequence (unless otherwise preferred by patient):  1. ondansetron (Zofran) 2. metoclopramide (Reglan)  Wait 15 minutes before proceeding to next medication in sequence.  Follow therapeutic duplication policy.                Perflutren Lipid Microsphere (DEFINITY) 6.52 MG/ML injection 1.5 mL  Dose: 1.5 mL  Freq: IMG once as needed Route: IV  PRN Reason: contrast  Start: 08/24/24 1019 End: 08/24/24 1019          1019 BK-Given           polyethylene glycol (PEG 3350) (Miralax) 17 g oral packet 17 g  Dose: 17 g  Freq: DAILY PRN Route: OR  PRN Reason: constipation  Start: 08/19/24 1535   Admin Instructions:   1 packet=17gm=1 heaping tablespoon; Dissolve in 8 oz of water  1 packet=17gm=1 heaping tablespoon; Dissolve in 8 oz of water                senna-docusate (Senokot-S) 8.6-50 MG per tab 2 tablet  Dose: 2 tablet  Freq: Daily as needed Route: OR  PRN Reason: constipation  Start:  08/19/24 1535                sodium chloride 0.9 % irrigation  Freq: Continuous PRN  Start: 08/13/24 1039 End: 08/13/24 1221                traMADol (Ultram) tab 50 mg  Dose: 50 mg  Freq: Every 12 hours PRN Route: OR  PRN Reason: moderate pain  Start: 08/18/24 1521   Admin Instructions:   Max dose 400 mg/day    1557 AJ-Given                    Latest Reference Range & Units 08/24/24 04:23 08/25/24 04:35 08/25/24 07:30 08/26/24 03:24 08/26/24 04:07 08/27/24 06:11   Glucose 70 - 99 mg/dL 87  135 (H) 112 (H)     Sodium 136 - 145 mmol/L 135 (L)  135 (L) 139     Potassium 3.5 - 5.1 mmol/L 4.7  4.9 4.3     Chloride 98 - 112 mmol/L 105  107 112     Carbon Dioxide, Total 21.0 - 32.0 mmol/L 21.0  18.0 (L) 19.0 (L)     BUN 9 - 23 mg/dL 50 (H)  42 (H) 52 (H)     CREATININE 0.55 - 1.02 mg/dL 2.01 (H)  1.90 (H) 1.79 (H)     CALCIUM 8.7 - 10.4 mg/dL 9.1  9.0 8.6 (L)     BUN/CREATININE RATIO 10.0 - 20.0  24.9 (H)  22.1 (H) 29.1 (H)     EGFR >=60 mL/min/1.73m2 25 (L)  27 (L) 29 (L)     ANION GAP 0 - 18 mmol/L 9  10 8     CALCULATED OSMOLALITY 275 - 295 mOsm/kg 293  293 303 (H)     ALKALINE PHOSPHATASE 55 - 142 U/L 160 (H)  168 (H) 161 (H)     AST (SGOT) <34 U/L 14  12 13     ALT (SGPT) 10 - 49 U/L <7 (L)  <7 (L) <7 (L)     Total Bilirubin 0.2 - 1.1 mg/dL 0.5  0.4 0.3     Globulin 2.0 - 3.5 g/dL 3.3  3.4 3.3     A/G Ratio 1.0 - 2.0  1.0  0.9 (L) 0.9 (L)     PROTEIN, TOTAL 5.7 - 8.2 g/dL 6.5  6.6 6.4     Albumin 3.2 - 4.8 g/dL 3.2  3.2 3.1 (L)     WBC 4.0 - 11.0 x10(3) uL 25.5 (H) 40.6 (H)   48.1 (H) 31.1 (H)   Hemoglobin 12.0 - 16.0 g/dL 8.2 (L) 9.1 (L)   7.9 (L) 8.5 (L)   Hematocrit 35.0 - 48.0 % 27.1 (L) 29.8 (L)   25.1 (L) 28.5 (L)   Platelet Count 150.0 - 450.0 10(3)uL 543.0 (H) 436.0   438.0 461.0 (H)   RBC 3.80 - 5.30 x10(6)uL 2.78 (L) 3.10 (L)   2.64 (L) 2.90 (L)   MCH 26.0 - 34.0 pg 29.5 29.4   29.9 29.3   MCHC 31.0 - 37.0 g/dL 30.3 (L) 30.5 (L)   31.5 29.8 (L)   MCV 80.0 - 100.0 fL 97.5 96.1   95.1 98.3   RDW 11.0 - 15.0 %  19.5 (H) 19.6 (H)   19.6 (H) 20.4 (H)   RDW-SD 35.1 - 46.3 fL 67.7 (H) 66.3 (H)   66.4 (H) 71.2 (H)   Prelim Neutrophil Abs 1.50 - 7.70 x10 (3) uL 18.13 (H) 35.23 (H)   42.04 (H) 23.48 (H)   MD Blood Smear Consult     Evaluation of the CBC with differential data and the peripheral blood smear demonstrates significant leukocytosis with increased absolute neutrophil count (mildly decreased absolute lymphocyte count) and moderate normocytic anemia.    Neutrophils are significantly increased and are without any dysplastic features; occasional neutrophils show toxic granulation. Occasional myelocytes and promyelocytes are seen as well. No blasts are seen.  Lymphocytes are decreased monocytes are mature appearing.    Red blood cells are normochromic and show significant anisopoikilocytosis, including eloina cells, ovalocytes, elliptocytes, and rare red cell fragments and teardrop cells. Occasional polychromatophils are present.    Platelets are normal in number and are mostly appropriately granular.    Overall, these findings are consistent with a reactive neutrophilia.     Occasional myelocytes, as well as metamyelocytes and and bands, can be seen in peripheral blood during severe inflammation, such as infectious or inflammatory conditions.     General differential considerations for neutrophilia may include infection/sepsis (most commonly bacterial, others), drugs/hormones (G-CSF, corticosteroids, epinephrine, lithium, toxins/poisons/venoms), tissue necrosis (infarct, trauma, burns, acute gout), inflammatory disorders (collagen vascular/autoimmune), acute hemorrhage/hemolysis, and metabolic disorders (uremia, ketoacidosis, others).     Differential causes for an anemia of this type may include hemorrhage, chronic inflammatory disorders (rheumatoid arthritis, SLE, inflammatory bowel disease, sarcoidosis, trauma, etc.), chronic infections (HIV, other viral infections, tuberculosis, pyelonephritis, osteomyelitis, chronic  fungal infections, subacute bacterial endocarditis, etc.), neoplasms (lymphoma, carcinomas, chronic leukemias and occasionally myelodysplastic syndrome), and end organ failure (chronic liver disease, endocrinopathies, etc.).     Clinical correlation is recommended.    Reviewed by Percy Henry M.D.         Evaluation of the CBC with differential data and the peripheral blood smear demonstrates significant leukocytosis with increased absolute neutrophil and monocyte counts and severe normocytic anemia.    Neutrophils are significantly increased and are without any dysplastic features; occasional neutrophils show toxic granulation. Occasional myelocytes and promyelocytes are seen as well. No blasts are seen.  Lymphocytes are decreased monocytes are mature appearing.    Red blood cells are normochromic and show significant anisopoikilocytosis, including eloina cells, ovalocytes, elliptocytes, and rare red cell fragments.  Occasional polychromatophils are present.    Platelets are normal in number and are mostly appropriately granular.    Overall, these findings are consistent with a reactive neutrophilia.     Occasional myelocytes, as well as metamyelocytes and and bands, can be seen in peripheral blood during severe inflammation, such as infectious or inflammatory conditions.     General differential considerations for neutrophilia may include infection/sepsis (most commonly bacterial, others), drugs/hormones (G-CSF, corticosteroids, epinephrine, lithium, toxins/poisons/venoms), tissue necrosis (infarct, trauma, burns, acute gout), inflammatory disorders (collagen vascular/autoimmune), acute hemorrhage/hemolysis, and metabolic disorders (uremia, ketoacidosis, others).     Differential causes for an anemia of this type may include hemorrhage, chronic inflammatory disorders (rheumatoid arthritis, SLE, inflammatory bowel disease, sarcoidosis, trauma, etc.), chronic infections (HIV, other viral infections, tuberculosis,  pyelonephritis, osteomyelitis, chronic fungal infections, subacute bacterial endocarditis, etc.), neoplasms (lymphoma, carcinomas, chronic leukemias and occasionally myelodysplastic syndrome), and end organ failure (chronic liver disease, endocrinopathies, etc.).     Clinical correlation is recommended.    Reviewed by Percy Henry M.D.        NEUTROPHIL ABSOLUTE MANUAL 1.50 - 7.70 x10(3) uL 18.62 (H) 38.57 (H)   43.77 (H)    LYMPHOCYTE ABSOLUTE MANUAL 1.00 - 4.00 x10(3) uL 1.28 0.81 (L)   0.00 (L)    MONOCYTE ABSOLUTE MANUAL 0.10 - 1.00 x10(3) uL 4.34 (H) 0.81   1.92 (H)    EOSINOPHIL ABSOLUTE MANUAL 0.00 - 0.70 x10(3) uL 0.00 0.00   0.00    BASOPHIL ABSOLUTE MANUAL 0.00 - 0.20 x10(3) uL 0.00 0.00   0.00    METAMYELOCYTE ABSOLUTE MANUAL 0 x10(3) uL 0.77 (H) 0.41 (H)   1.44 (H)    MYELOCYTE ABSOLUTE MANUAL 0 x10(3) uL 0.51 (H)        PROMYELOCYTE ABSOLUTE MANUAL 0 x10(3) uL     0.96 (H)    BLAST ABSOLUTE MANUAL 0 x10(3) uL 0.00        NEUTROPHILS % MANUAL % 71 90   88    BAND % % 2 5   3    LYMPHOCYTE % MANUAL % 5 2   0    MONOCYTE % MANUAL % 17 2   4    EOSINOPHIL % MANUAL % 0 0   0    BASOPHIL % MANUAL % 0 0   0    METAMYELOCYTE % % 3 1   3    MYELOCYTE % % 2        PROMYELOCYTE % %     2    BLAST % % 0        TOTAL CELLS COUNTED  100 100   100    RBC MORPHOLOGY Normal, Slide reviewed, see previous RBC morphology.  See morphology below ! See morphology below !   Slide reviewed, see previous RBC morphology.    POLYCHROMASIA -   1+       ECHINOCYTES, ABBY CELLS -   1+       ACANTHOCYTES, SPUR CELLS -   1+       MICROCYTOSIS -  1+ 1+       MACROCYTOSIS -  1+ 1+       TEAR DROP CELLS -   1+       OVALOCYTES -  1+ 1+       TOXIC GRANULATION (none)   Present !       PLATELET MORPHOLOGY Normal  See morphology below ! Normal   Normal    Giant PLTs -  Few !        (H): Data is abnormally high  (L): Data is abnormally low  !: Data is abnormal

## 2024-08-27 NOTE — PROGRESS NOTES
[facility] ID PROGRESS NOTE    Margaret Silverio Patient Status:  Inpatient    3/14/1945 MRN [id number]   Location [facility] 2W/SW Attending Veto Zhang MD   Hosp Day # 15 PCP Johnathon Rodriguez, DO     SUBJECTIVE  ROS done. Complains of sacral wound pain. Afebrile.    ASSESSMENT:     Antibiotics: Meropenem (IV ceftriaxone -, vancomycin)     # Fever-better  # Staph epidermidis bacteremia in 1/2 sets on 24 - likely contaminant               -Has bioprosthetic MVR and L chest PPM   -TTE without vegetation  # Leukocytosis - suspect reactive; r/o bacteremia; repeat cx NGTD  -Also got 1 dose of solumedrol  # GEORGE on CKD  # Anemia s/p transfusion      # Sacral wound stage 2 s/p OR debridement 24 - cx E.coli, anaerobes - currently no signs of infection     # Asymptomatic bacteruria - E. coli  # Intermittent AMS    # RA, bedbound, previously on MTX and prednisone               - received prednisone at last discharge; currently off related to limited PO     PLAN:  -  Continue on meropenem and OVP, day 3.  -  Follow fever curve, wbc.   -  Reviewed labs, micro, imaging reports.  -  Case d/w patient, RN.     History of Present Illness:  79-year-old female with a history of severe nonischemic cardiomyopathy with multiple admissions for heart failure, A-fib, CKD, severe RA with multiple joints involved was recently discharged about 1 month prior.  During that admission had a large left pleural effusion on dobutamine, Bumex, thoracentesis, left and right heart cath showing normal coronaries but severe volume overload.  Also had GEORGE and leukopenia.  Now admitted on  with painful decubitus ulcer.  Patient is bedbound with severe RA on methotrexate and prednisone.  Afebrile, 2 L nasal cannula.  Seen by general surgery and taken the OR on  status post excision of cyst and sharp debridement of the decubitus ulcer.  Cultures with E. coli and anaerobes.  IV  ceftriaxone for sacral wound and UTI completed 8/21.  Now with fever of 100.2 on 8/22 with increased WBC.  Blood culture sent with staph not aureus in 1 out of 2 sets.  Chest x-ray with no new focal opacity with persistent edema noted.  Goals of care discussion ongoing related to poor appetite, pain control.     OBJECTIVE  /70   Pulse 61   Temp 98.2 °F (36.8 °C) (Temporal)   Resp 13   Wt 120 lb (54.4 kg)   SpO2 100%   BMI 20.60 kg/m²     Gen:   Awake, in bed  HEENT:  EOMI, neck supple  CV/lungs:  RRR, lungs clear in all fields  Abdom:  Soft, no TTP  Skin/extrem:  No rashes, no c/c/e, hand contractures noted  :   Purewick+  Lines:  PIV+    Laboratory Data: Reviewed     Microbiology: Reviewed     Radiology: Reviewed    [facility]  [phone]

## 2024-08-27 NOTE — PROGRESS NOTES
Hospice referral received. [facility] will follow up with family to set up informational meeting.     Dilma Lew  [facility]  [id number]

## 2024-08-27 NOTE — DIETARY NOTE
ADULT NUTRITION REASSESSMENT    Pt is at high nutrition risk.  Pt meets severe malnutrition criteria.      CRITERIA FOR MALNUTRITION DIAGNOSIS:  Criteria for severe malnutrition diagnosis: acute illness/injury related to wt loss greater than 7.5% in 3 months, energy intake less than 50% for greater than 5 days, body fat moderate depletion, and muscle mass moderate depletion.    RECOMMENDATIONS TO MD:  CPM. Noted hospice meeting tomorrow   See Nutrition Intervention for oral nutritional supplement (ONS) specifics     ADMITTING DIAGNOSIS:  Urinary tract infection without hematuria, site unspecified [N39.0]  Anemia, unspecified type [D64.9]  Pressure injury of skin of sacral region, unspecified injury stage [L89.159]  PERTINENT PAST MEDICAL HISTORY:   Past Medical History:    Acute kidney insufficiency    Anemia    Arrhythmia    Back problem    CHF (congestive heart failure) (HCC)    CKD (chronic kidney disease) stage 3, GFR 30-59 ml/min (HCC)    Deep vein thrombosis (HCC)    on B.T for only a month, possibly Magen    Essential hypertension    High blood pressure    History of blood transfusion    Rheumatoid arthritis (HCC)    Seizure disorder (HCC)    Pt denied at screening call 6/28/24     PATIENT STATUS: Initial 08/14/24: Pt admit for urinary tract infection without hematuria, anemia and pressure injury or skin of sacral region. PMH sig for HTN, CHF, CKD and others noted above. Pt assessed due to screening at risk for decreased appetite, unintentional weight loss and pressure injury (PI) - unstageable and maceration to right buttock and stage 2 & shear to left buttock. Pt known to nutrition services from previous admissions, last seen 6/19/24 and provided with HF diet education.  Chart reviewed, pt admitted with c/o sacral wound x 3 weeks and dark/loose stools. Pt with recent admission (7/15-7/22) for acute on chronic congestive heart failure - treated with diuretics and dobutamine. General surgery consulted for  debridement.  POD#1 s/p sacral debridement. Discussion with RN, poor appetite. Intakes reviewed, 25-40% x 2 meals since admit (poor). Pt visited, resting with eyes closed but answering majority of questions. Pt reports poor appetite/intake but unable to report timeframe. Pt reports not eating. Pt notes used to drink Ensure but stopped due to going right through causing pain/difficulties prior to surgery. Pt reports weight is not good, usual body weight (UBW) 142# but unable to determine last time weighed this. Current weight 120#. EMR weight review, last known weight 112# 2 oz on 8/1/24. Per EMR weight review, pt noted weighing 129# 10 oz on 7/1/24 - 9.6# or 7.4% weight loss x 1 month (significant), 129# 13 oz on 5/16/24 - 9.8# or 7.6% weight loss x 3 months (significant), 149# 14 oz on 2/3/24 - 29.9# or 19.9% weight loss x 6 months (significant) and 141# 14 oz on 8/21/23 - 21.9# or 15.4% weight loss x 1 year (non-significant). Nutrition focused physical exam (NFPE) completed, see below for details. Encouraged small frequent meals with emphasis on high calorie and high protein and ONS to help maximize nutrition. +ONS  per discussion.    8/19 UPDATE: pt slow to respond but did awake when I directed a question to her, but then fell back asleep. Daughter reports that her dad can get the pt to drink some of the ensure enlive. Otherwise pt eating very little. Pt with high K+ today--not diet related due to limited intake, but did adjust diet to liberalize cardiac diet and added low K+.      08/23/24 UPDATE: GOC discussions on-going. Per MD documentation, family report the patient would not want a feeding tube. Pt was more alert/awake this AM however was given Norco due to increased pain - now is sleepy/drowsy and inappropriate for interview. Refusing lunch. Only taking a few sips of ONS daily. POD #10 s/p excision of cyst, sharp excisional debridement of buttock decubitus ulcer.   08/27/24 UPDATE:     Noted hospice  meeting tomorrow. Po intake remains poor overall with a few fair/good meals over past week. Pt having pain impacting appetite but palliative follows and pain meds adjusted to address. No feeding tube/family. Supplements provided but intake poor.   FOOD/NUTRITION RELATED HISTORY:  Appetite:  decreased/poor appetite PTA per pt. Not eating much  Intake:  Minimal intake; Consumed small amount of cereal this AM; noted no meal trays ordered for pt on 8/22  Intake Meeting Needs: No, but oral nutrition supplements (ONS) to maximize   Percent Meals Eaten (last 6 days)       Date/Time Percent Meals Eaten (%)    08/21/24 1100 --     Percent Meals Eaten (%): Simultaneous filing. User may not have seen previous data. at 08/21/24 1100    08/21/24 1146 5 %    08/22/24 1300 0 %    08/22/24 1550 0 %    08/22/24 1900 0 %    08/23/24 1009 30 %    08/23/24 1300 0 %    08/23/24 1850 0 %    08/25/24 0900 0 %    08/25/24 1450 95 %    08/26/24 0952 40 %    08/26/24 1324 0 %     Percent Meals Eaten (%): refused at 08/26/24 1324    08/26/24 1750 10 %        Food Allergies: No Known Food Allergies (NKFA)  Cultural/Ethnic/Zoroastrianism Preferences: Not Obtained    GASTROINTESTINAL: +BM last recorded on 8/25/2024 and abdomen soft, non-distended with active bowel sounds    MEDICATIONS: reviewed; Noted non-cardiac electrolyte replacement protocol ordered;  Continuous meds: (IVF)   dextrose 5%-sodium chloride 0.9% 75 mL/hr at 08/27/24 1010   Scheduled meds:    vancomycin  125 mg Oral q12h    meropenem  500 mg Intravenous q12h    levETIRAcetam  500 mg Intravenous Daily    heparin  5,000 Units Subcutaneous Q8H BROOKLYNN    acetaminophen  650 mg Oral Q8H    patiromer  8.4 g Oral Once    sacubitril-valsartan  1 tablet Oral BID    bumetanide  1 mg Oral Daily    metoprolol succinate  25 mg Oral Daily Beta Blocker    midodrine  5 mg Oral TID    sodium hypochlorite   Topical BID    amiodarone  200 mg Oral Daily    ferrous sulfate  325 mg Oral Daily with breakfast     folic acid  800 mcg Oral Daily    gabapentin  300 mg Oral TID    [Held by provider] isosorbide dinitrate  20 mg Oral TID (Nitrates)    pantoprazole  40 mg Oral BID AC   PRN meds: for pain and antinausea.   LABS: reviewed; A1c 5.9% on 11/27/22; Osiris on CKD. Last labs 8/26.  89/27: POC BG: X 2 low early am rx with D50 per protocol.   Recent Labs     08/25/24  0730 08/26/24  0324   * 112*   BUN 42* 52*   CREATSERUM 1.90* 1.79*   CA 9.0 8.6*   * 139   K 4.9 4.3    112   CO2 18.0* 19.0*   OSMOCALC 293 303*     NUTRITION RELATED PHYSICAL FINDINGS:  - Nutrition Focused Physical Exam (NFPE): moderate depletion body fat triceps region and moderate depletion muscle mass clavicle region unable to evaluate lower extremities at this time - largely bed-bound but will get up and move to a chair for part of the day most days  - Fluid Accumulation: +2 LUE and L hand non-pitting.  see RN documentation for details  - Skin Integrity: Stage 3  on sacrum and rt buttocks. see RN documentation for details    ANTHROPOMETRICS:  HT:  162.6 cm (5' 4\")  WT: 54.4 kg (120 lb) - no current wt available  ADMISSION WT: 54.4 kg (120 lbs)  BMI: Body mass index is 20.6 kg/m².  BMI CLASSIFICATION: <22 considered underweight for advanced age  IBW: 120 lbs        100% IBW  Usual Body Wt: 142 lbs per pt but unable to determine timeframe      85% UBW    WEIGHT HISTORY: Per EMR weight review, pt noted weighing 149# February 2024 and 141# August 2023  No data found.    Wt Readings from Last 10 Encounters:   08/12/24 54.4 kg (120 lb)   08/01/24 50.8 kg (112 lb 1.6 oz)   07/22/24 52.3 kg (115 lb 3.2 oz)   07/09/24 56.6 kg (124 lb 12.8 oz)   06/28/24 61.7 kg (136 lb)   07/01/24 58.8 kg (129 lb 9.6 oz)   06/29/24 60.8 kg (134 lb)   06/26/24 63.5 kg (140 lb)   06/21/24 63.5 kg (140 lb)   06/13/24 62.1 kg (137 lb)     NUTRITION DIAGNOSIS/PROBLEM:   Severe Malnutrition related to Acute - Physiological causes resulting in anorexia or diminished  intake as evidenced by wt loss greater than 7.5% in 3 months, energy intake less than 50% for greater than 5 days, body fat moderate depletion, and muscle mass moderate depletion.    NUTRITION DIAGNOSIS PROGRESS:  No Improvement (continue)--limited po intake. GOC discussions on-going.      NUTRITION INTERVENTION:   NUTRITION PRESCRIPTION:   Estimated Nutrition needs: --dosing wt of 54.4 kg - wt taken on 8/12/2024  Calories: 5070-2803 calories/day (28-32 calories per kg Dosing wt)  Protein: 64-80 g protein/day (1.2-1.5 g protein/kg Dosing wt)    - Diet:       Procedures    Regular/General diet Is Patient on Accuchecks? No   Most liberal diet in view of poor nutrition intake.     - RD Malnutrition Care Plan: Adjusted diet to least restrictive to maximize intake, Encouraged increased PO intake, Encouraged small frequent meals with emphasis on high calorie/high protein, and Initiated ONS (oral nutritional supplements)  - Meals and snacks: Encouraged small frequent meals and Encouraged increased PO intake  - Medical Food Supplements: RD added Ensure Enlive (350 calories/ 20 g protein each) BID Strawberry. Rational/use of oral supplements discussed.  - Vitamin and mineral supplements: folic acid and iron / MD  - Feeding assistance: meal set up and feed  - Nutrition education: Discussed importance of adequate energy and protein intake   - Coordination of nutrition care: collaboration with other providers - discussed with RN on unit  - Discharge and transfer of nutrition care to new setting or provider: monitor plans - GOC discussions on-going. Hospice informational meeting planned for 8/28.     MONITOR AND EVALUATE/NUTRITION GOALS:  - Food and Nutrient Intake:      Monitor: adequacy of PO intake and adequacy of supplement intake  - Anthropometric Measurement:    Monitor weight  - Nutrition Goals:      PO and supplement greater than 75% of needs, labs within acceptable limits, euglycemia, support wound healing, and support  normal BM. Supportive nutrition care.     DIETITIAN FOLLOW UP: RD to follow    Melody Macedo RD, LDN, CNSC ([id number])

## 2024-08-27 NOTE — PROGRESS NOTES
[facility]     Margaret Silverio Patient Status:  Inpatient    3/14/1945 MRN [id number]   Location [facility] POST ANESTHESIA CARE UNIT Attending Fernando Galan MD   Hosp Day # 15 PCP Johnathon Rodriguez,      Subjective:     Pt was seen and examined.   Resting comfortably in bed, no overnight events reported by the nursing staff.   Appears to be in good spirits and is able to hold a conversation.   All questions and concerns addressed.       Objective:    Review of Systems:   ROS completed; pertinent positive and negatives stated in subjective.      Vital signs:  Temp:  [97.2 °F (36.2 °C)-98.2 °F (36.8 °C)] 98.2 °F (36.8 °C)  Pulse:  [60-61] 61  Resp:  [11-18] 13  BP: (123-136)/(66-78) 131/70  SpO2:  [92 %-100 %] 100 %      Physical Exam:    Gen: alert, oriented x2-3  Chest: good air entry CTABL  CVS: normal s1 and s2 RR  Abd: NABS soft NT ND  Neuro: non-focal, following commands  Skin: decub dressing CDI  Ext: no edema in bilateral LE      Diagnostic Data:    Labs:  Recent Labs   Lab 24  0643 24  0426 24  0423 24  0435 24  0407 24  0611   WBC 11.9* 15.1* 25.5* 40.6* 48.1* 31.1*   HGB 8.4* 8.9* 8.2* 9.1* 7.9* 8.5*   MCV 93.8 97.7 97.5 96.1 95.1 98.3   .0* 565.0* 543.0* 436.0 438.0 461.0*   BAND 4 5 2 5 3  --        Recent Labs   Lab 24  0423 24  0730 24  0324   GLU 87 135* 112*   BUN 50* 42* 52*   CREATSERUM 2.01* 1.90* 1.79*   CA 9.1 9.0 8.6*   ALB 3.2 3.2 3.1*   * 135* 139   K 4.7 4.9 4.3    107 112   CO2 21.0 18.0* 19.0*   ALKPHO 160* 168* 161*   AST 14 12 13   ALT <7* <7* <7*   BILT 0.5 0.4 0.3   TP 6.5 6.6 6.4       Estimated Creatinine Clearance: 22.2 mL/min (A) (based on SCr of 1.79 mg/dL (H)).    No results for input(s): \"PTP\", \"INR\" in the last 168 hours.           Imaging: Imaging data reviewed in Epic.    Medications:    vancomycin  125 mg Oral  q12h    meropenem  500 mg Intravenous q12h    levETIRAcetam  500 mg Intravenous Daily    heparin  5,000 Units Subcutaneous Q8H BROOKLYNN    acetaminophen  650 mg Oral Q8H    patiromer  8.4 g Oral Once    sacubitril-valsartan  1 tablet Oral BID    bumetanide  1 mg Oral Daily    metoprolol succinate  25 mg Oral Daily Beta Blocker    midodrine  5 mg Oral TID    sodium hypochlorite   Topical BID    amiodarone  200 mg Oral Daily    fentaNYL  1 patch Transdermal Q72H    ferrous sulfate  325 mg Oral Daily with breakfast    folic acid  800 mcg Oral Daily    gabapentin  300 mg Oral TID    [Held by provider] isosorbide dinitrate  20 mg Oral TID (Nitrates)    pantoprazole  40 mg Oral BID AC       Assessment & Plan:     GPC bacteremia  Worsening Leukocytosis  Sacral decubitus ulcer  Gsx on consult  Now s/p Excision of cyst, sharp excisional debridement of buttock decubitus ulcer  PRN pain control  Completed 10 days of abx  WBC 8.5 ->11.9 -> 15 -> 25.5 -> 40 -> 38  No fever curve, no obv source noted  UA and repeat Bcx pending  ID on consult  Repeat Bcx, follow previous cultures  Started on Vanc and Meropenem  ICU on consult   Hold off stress dose steroids at this time  Close neuromonitoring  Currently on PO Vanc, IV Meropenem  AMS -> intermittent -> much improved  Unclear etiology, consider metabolic encephalopathy  Neurology following, EEG WNL  Keppra resumed  CT head non-acute  ABG reviewed  ICU on consult   Hold off stress dose steroids at this time  Close neuromonitoring  Fall precautions  Hyponatremia  Na 129 -> 133 -> 135 - 139  Continue D5NS  Monitor labs  UTI  UA reviewed  Ucx pending -> e. coli  Continue IV abx - completed course of treatment  NICM, HFrEF  Hx of paroxysmal afib  Hx of BRANDYN occlusion  AV paced  Continue home meds  GEORGE on CKD 3 - stable  Monitor renal function  Avoid nephrotoxic agents  Anemia  Hgb stable today  Cont to monitor   Thrombocytosis  Monitor labs  Heparin s/c for DVT ppx  Nutrition  Dietitian on  consult  Family reports the patient would not want a feeding tube  IVF discontinued, encourage to eat more  GOC  Palliative care following. Patient and family okay with CPC  Appreciate CM/SW placement    Plan of care discussed with RN at bedside       Goals of care: Palliative care has been consulted, discussions on going regarding DC plan, daughter will discuss with family before making a decision on Hospice care. Otherwise will dc with home palliative care.     Supplementary Documentation:     Quality:  DVT Prophylaxis: Heparin s/c  CODE status: DNAR/Select      Estimated date of discharge: TBD  Discharge is dependent on: clinical stability  At this point Ms. Silverio is expected to be discharge to: TBD    MDM: High

## 2024-08-28 LAB
BACTERIA BLD CULT: POSITIVE
GLUCOSE BLDC GLUCOMTR-MCNC: 109 MG/DL (ref 70–99)
GLUCOSE BLDC GLUCOMTR-MCNC: 122 MG/DL (ref 70–99)
GLUCOSE BLDC GLUCOMTR-MCNC: 79 MG/DL (ref 70–99)
GLUCOSE BLDC GLUCOMTR-MCNC: 97 MG/DL (ref 70–99)

## 2024-08-28 PROCEDURE — 99233 SBSQ HOSP IP/OBS HIGH 50: CPT | Performed by: INTERNAL MEDICINE

## 2024-08-28 PROCEDURE — 99233 SBSQ HOSP IP/OBS HIGH 50: CPT | Performed by: HOSPITALIST

## 2024-08-28 NOTE — PLAN OF CARE
Problem: PAIN - ADULT  Goal: Verbalizes/displays adequate comfort level or patient's stated pain goal  Description: INTERVENTIONS:  - Encourage pt to monitor pain and request assistance  - Assess pain using appropriate pain scale  - Administer analgesics based on type and severity of pain and evaluate response  - Implement non-pharmacological measures as appropriate and evaluate response  - Consider cultural and social influences on pain and pain management  - Manage/alleviate anxiety  - Utilize distraction and/or relaxation techniques  - Monitor for opioid side effects  - Notify MD/LIP if interventions unsuccessful or patient reports new pain  - Anticipate increased pain with activity and pre-medicate as appropriate  Outcome: Progressing     Problem: SKIN/TISSUE INTEGRITY - ADULT  Goal: Skin integrity remains intact  Description: INTERVENTIONS  - Assess and document risk factors for pressure ulcer development  - Assess and document skin integrity  - Monitor for areas of redness and/or skin breakdown  - Initiate interventions, skin care algorithm/standards of care as needed  Outcome: Progressing     Problem: Diabetes/Glucose Control  Goal: Glucose maintained within prescribed range  Description: INTERVENTIONS:  - Monitor Blood Glucose as ordered  - Assess for signs and symptoms of hyperglycemia and hypoglycemia  - Administer ordered medications to maintain glucose within target range  - Assess barriers to adequate nutritional intake and initiate nutrition consult as needed  - Instruct patient on self management of diabetes  Outcome: Progressing  No acute changes overnight.  Patient aoX4 with complaints of severe pain managed with PRN Andrews.  Sacral dressing cleansed and changed; VSS on room air, IVF infusing, PO vancomycin and IV merrem given as ordered.  Isordil and Midodrine on hold by physician.  Q6 accucheck within normal range overnight; pt has past issue with hypoglycemia.  D5 0.9NS at 75. General diet.   Frequent rounding and Q2 turn. Takes pills whole one by one.  Safety precautions maintained. Call light within reach.

## 2024-08-28 NOTE — HOSPICE RN NOTE
[facility] RN met with patient, spouse Low, DTR Barbi and grandson to discuss comfort care. Informational meeting complete. Hospice philosophy, Medicare benefit, and levels of care discussed. All questions answered.  Barbi mentions patient going to [facility] to try and get her back to her baseline prior to this admit. They are considering [facility]. Patient verbalizes twice that she would like to try rehab as well. [facility]  Care book left with patient and family. Family encouraged to call [phone] with questions or if patient decides before/after rehab that she would like to proceed with hospice.    EVA KooN, RN  [facility], Start of Care  [phone] (After-hours)

## 2024-08-28 NOTE — PROGRESS NOTES
[facility]  part of [place] Hospitalist Progress Note     Margaret Silverio Patient Status:  Inpatient    3/14/1945 MRN [id number]   Location [facility] POST ANESTHESIA CARE UNIT Attending Fernando Galan MD   Hosp Day # 16 PCP Johnathon Rodriguez, DO     Subjective:     Pt was seen and examined.   Resting comfortably in bed, alert and awake  No acute distress, no pain  Daughter at bedside       Objective:    Review of Systems:   ROS completed; pertinent positive and negatives stated in subjective.      Vital signs:  Temp:  [97.4 °F (36.3 °C)-97.7 °F (36.5 °C)] 97.6 °F (36.4 °C)  Pulse:  [60-66] 60  Resp:  [14-18] 18  BP: (118-145)/(63-80) 145/63  SpO2:  [93 %-100 %] 93 %      Physical Exam:    Gen: alert, oriented x2-3  Chest: good air entry CTABL  CVS: normal s1 and s2 RR  Abd: NABS soft NT ND  Neuro: non-focal, following commands  Skin: decub dressing CDI  Ext: no edema in bilateral LE      Diagnostic Data:    Labs:  Recent Labs   Lab 24  0643 24  0426 24  0423 24  0435 24  0407 24  0611   WBC 11.9* 15.1* 25.5* 40.6* 48.1* 31.1*   HGB 8.4* 8.9* 8.2* 9.1* 7.9* 8.5*   MCV 93.8 97.7 97.5 96.1 95.1 98.3   .0* 565.0* 543.0* 436.0 438.0 461.0*   BAND 4 5 2 5 3  --        Recent Labs   Lab 24  0423 24  0730 24  0324   GLU 87 135* 112*   BUN 50* 42* 52*   CREATSERUM 2.01* 1.90* 1.79*   CA 9.1 9.0 8.6*   ALB 3.2 3.2 3.1*   * 135* 139   K 4.7 4.9 4.3    107 112   CO2 21.0 18.0* 19.0*   ALKPHO 160* 168* 161*   AST 14 12 13   ALT <7* <7* <7*   BILT 0.5 0.4 0.3   TP 6.5 6.6 6.4       Estimated Creatinine Clearance: 22.2 mL/min (A) (based on SCr of 1.79 mg/dL (H)).    No results for input(s): \"PTP\", \"INR\" in the last 168 hours.           Imaging: Imaging data reviewed in Epic.    Medications:    vancomycin  125 mg Oral q12h    meropenem  500 mg Intravenous q12h    levETIRAcetam  500 mg Intravenous Daily     heparin  5,000 Units Subcutaneous Q8H [facility]    acetaminophen  650 mg Oral Q8H    patiromer  8.4 g Oral Once    sacubitril-valsartan  1 tablet Oral BID    bumetanide  1 mg Oral Daily    metoprolol succinate  25 mg Oral Daily Beta Blocker    [Held by provider] midodrine  5 mg Oral TID    sodium hypochlorite   Topical BID    amiodarone  200 mg Oral Daily    ferrous sulfate  325 mg Oral Daily with breakfast    folic acid  800 mcg Oral Daily    gabapentin  300 mg Oral TID    [Held by provider] isosorbide dinitrate  20 mg Oral TID (Nitrates)    pantoprazole  40 mg Oral BID AC       Assessment & Plan:     GPC bacteremia  Worsening Leukocytosis  Sacral decubitus ulcer  Gsx on consult  Now s/p Excision of cyst, sharp excisional debridement of buttock decubitus ulcer  PRN pain control  Completed 10 days of abx  WBC 8.5 ->11.9 -> 15 -> 25.5 -> 40 -> 38 -> 31  No fever curve, no obv source noted  ID on consult  Repeat Bcx NGTD  Started on Vanc and Meropenem  ICU on consult   Hold off stress dose steroids at this time  Close neuro monitoring  Currently on PO Vanc, IV Meropenem  AMS -> intermittent -> much improved  Unclear etiology, consider metabolic encephalopathy  Neurology following, EEG WNL  Keppra resumed  CT head non-acute  ABG reviewed  ICU on consult   Hold off stress dose steroids at this time  Close neuromonitoring  Fall precautions  Hyponatremia  Na 129 -> 133 -> 135 - 139  Continue D5NS  Monitor labs  UTI  UA reviewed  Ucx pending -> e. coli  Continue IV abx - completed course of treatment  NICM, HFrEF  Hx of paroxysmal afib  Hx of BRANDYN occlusion  AV paced  Continue home meds  GEORGE on CKD 3 - stable  Monitor renal function  Avoid nephrotoxic agents  Anemia  Hgb stable today  Cont to monitor   Thrombocytosis  Monitor labs  Heparin s/c for DVT ppx  Nutrition  Dietitian on consult  Family reports the patient would not want a feeding tube  IVF discontinued, encourage to eat more  GOC  Palliative care following. Patient and  family okay with [facility]  Family to select SNF  Hospice meeting today    Plan of care discussed with RN at bedside     Supplementary Documentation:     Quality:  DVT Prophylaxis: Heparin s/c  CODE status: [facility]AR/Select      Estimated date of discharge: TBD  Discharge is dependent on: clinical stability  At this point Ms. Silverio is expected to be discharge to: TBD    MDM: High

## 2024-08-28 NOTE — PROGRESS NOTES
[facility] ID PROGRESS NOTE    Margaret Silverio Patient Status:  Inpatient    3/14/1945 MRN [id number]   Location [facility] 2W/SW Attending Veto Zhang MD   Hosp Day # 16 PCP Johnathon Rodriguez, DO     SUBJECTIVE  ROS done. Still having pain. Afebrile.    ASSESSMENT:     Antibiotics: Meropenem (IV ceftriaxone -, vancomycin)     # Fever-better  # Staph epidermidis bacteremia in 1/2 sets on 24 - likely contaminant               -Has bioprosthetic MVR and L chest PPM   -TTE without vegetation  # Leukocytosis - suspect reactive; r/o bacteremia; repeat cx NGTD  -Also got 1 dose of solumedrol  # GEORGE on CKD  # Anemia s/p transfusion      # Sacral wound stage 2 s/p OR debridement 24 - cx E.coli, anaerobes - currently no signs of infection     # Asymptomatic bacteruria - E. coli  # Intermittent AMS    # RA, bedbound, previously on MTX and prednisone               - received prednisone at last discharge; currently off related to limited PO     PLAN:  -  Continue on meropenem and OVP, day 4 of 7.  -  Awaiting hospice discussions.  -  Follow fever curve, wbc.   -  Reviewed labs, micro, imaging reports.  -  Case d/w patient, RN.     History of Present Illness:  79-year-old female with a history of severe nonischemic cardiomyopathy with multiple admissions for heart failure, A-fib, CKD, severe RA with multiple joints involved was recently discharged about 1 month prior.  During that admission had a large left pleural effusion on dobutamine, Bumex, thoracentesis, left and right heart cath showing normal coronaries but severe volume overload.  Also had GEORGE and leukopenia.  Now admitted on  with painful decubitus ulcer.  Patient is bedbound with severe RA on methotrexate and prednisone.  Afebrile, 2 L nasal cannula.  Seen by general surgery and taken the OR on  status post excision of cyst and sharp debridement of the decubitus ulcer.  Cultures with E.  coli and anaerobes.  IV ceftriaxone for sacral wound and UTI completed 8/21.  Now with fever of 100.2 on 8/22 with increased WBC.  Blood culture sent with staph not aureus in 1 out of 2 sets.  Chest x-ray with no new focal opacity with persistent edema noted.  Goals of care discussion ongoing related to poor appetite, pain control.     OBJECTIVE  /63 (BP Location: Left arm)   Pulse 60   Temp 97.6 °F (36.4 °C) (Axillary)   Resp 18   Wt 120 lb (54.4 kg)   SpO2 93%   BMI 20.60 kg/m²     Gen:   Awake, in bed  HEENT:  EOMI, neck supple  CV/lungs:  RRR, lungs clear in all fields  Abdom:  Soft, no TTP  Skin/extrem:  No rashes, no c/c/e, hand contractures noted  :   Purewick+  Lines:  PIV+    Laboratory Data: Reviewed     Microbiology: Reviewed     Radiology: Reviewed    [facility]  [phone]

## 2024-08-28 NOTE — PROGRESS NOTES
Daily Pulmonary/ICU/Critical Care Progress Note          SUBJECTIVE:  Afebrile on NC  Reports her bottom area hurts.   Wondering when she's going to go home        ALLERGIES:  Allergies   Allergen Reactions    Lisinopril Coughing         MEDS:  Home Medications:  Outpatient Medications Marked as Taking for the 8/12/24 encounter (Hospital Encounter)   Medication Sig Dispense Refill    bumetanide 1 MG Oral Tab Take 1 tablet (1 mg total) by mouth daily. 30 tablet 2    carvedilol 6.25 MG Oral Tab Take 0.5 tablets (3.125 mg total) by mouth 2 (two) times daily with meals. 30 tablet 2    sennosides (SENNA-TIME) 8.6 MG Oral Tab senna 8.6 mg tablet, [RxNorm: 498232]      cyclobenzaprine 10 MG Oral Tab Take 1 tablet (10 mg total) by mouth nightly. 30 tablet 1    fentaNYL 12 MCG/HR Transdermal Patch 72 Hr Place 1 patch onto the skin every third day. (Patient taking differently: Place 1 patch onto the skin every third day. Placed 8/10 left arm) 10 patch 0    midodrine 5 MG Oral Tab Take 1 tablet (5 mg total) by mouth 2 (two) times daily before meals. 60 tablet 0    senna-docusate 8.6-50 MG Oral Tab Take 2 tablets by mouth daily. 30 tablet 0    isosorbide dinitrate 20 MG Oral Tab Take 1 tablet (20 mg total) by mouth TID (Nitrates). 90 tablet 3    gabapentin 300 MG Oral Cap Take 1 capsule (300 mg total) by mouth 3 (three) times daily. 90 capsule 0    dapagliflozin (FARXIGA) 10 MG Oral Tab Take 1 tablet (10 mg total) by mouth daily. 30 tablet 5    spironolactone 25 MG Oral Tab       alendronate 70 MG Oral Tab Take 1 tablet (70 mg total) by mouth once a week. (Patient taking differently: Take 1 tablet (70 mg total) by mouth once a week. Every Tuesday) 12 tablet 3    methotrexate 2.5 MG Oral Tab TAKE 6 TABLETS BY MOUTH 1 TIME A WEEK 77 tablet 0    lidocaine-menthol 4-1 % External Patch Place 1 patch onto the skin daily. 30 patch 0    PANTOPRAZOLE 40 MG Oral Tab EC TAKE 1 TABLET(40 MG) BY MOUTH TWICE DAILY BEFORE MEALS 180 tablet 0     SACUBITRIL-VALSARTAN 49-51 MG Oral Tab Take 1 tablet by mouth 2 (two) times daily. 60 tablet 1    folic acid 800 MCG Oral Tab Take 1 tablet (800 mcg total) by mouth daily. 90 tablet 0    amiodarone 200 MG Oral Tab Take 1 tablet (200 mg total) by mouth daily.      ferrous sulfate 325 (65 FE) MG Oral Tab EC Take 1 tablet (325 mg total) by mouth daily with breakfast.       Scheduled Medication:   vancomycin  125 mg Oral q12h    meropenem  500 mg Intravenous q12h    levETIRAcetam  500 mg Intravenous Daily    heparin  5,000 Units Subcutaneous Q8H [facility]    acetaminophen  650 mg Oral Q8H    patiromer  8.4 g Oral Once    sacubitril-valsartan  1 tablet Oral BID    bumetanide  1 mg Oral Daily    metoprolol succinate  25 mg Oral Daily Beta Blocker    [Held by provider] midodrine  5 mg Oral TID    sodium hypochlorite   Topical BID    amiodarone  200 mg Oral Daily    ferrous sulfate  325 mg Oral Daily with breakfast    folic acid  800 mcg Oral Daily    gabapentin  300 mg Oral TID    [Held by provider] isosorbide dinitrate  20 mg Oral TID (Nitrates)    pantoprazole  40 mg Oral BID AC     Continuous Infusing Medication:   dextrose 5%-sodium chloride 0.9% 75 mL/hr at 08/27/24 2213     PRN Medications:    HYDROcodone-acetaminophen    HYDROcodone-acetaminophen    HYDROmorphone **OR** [DISCONTINUED] HYDROmorphone **OR** [DISCONTINUED] HYDROmorphone    senna-docusate    polyethylene glycol (PEG 3350)    glucose **OR** glucose **OR** glucose-vitamin C **OR** dextrose **OR** glucose **OR** glucose **OR** glucose-vitamin C    traMADol    ondansetron    metoclopramide    acetaminophen       PHYSICAL EXAM:  /74 (BP Location: Left arm)   Pulse 60   Temp 97.7 °F (36.5 °C) (Axillary)   Resp 16   Wt 120 lb (54.4 kg)   SpO2 97%   BMI 20.60 kg/m²      CONSTITUTIONAL: alert, oriented, no apparent distress  HEENT: atraumatic normocephalic  MOUTH: mucous membranes are moist. No OP exudates  NECK/THROAT: no JVD. Trachea midline. No  obvious thyromegaly  LUNG: clear b/l upper, no wheezing, crackles. Chest symmetric with respiratory motion  HEART: regular rate and rhythm, + murmer   ABD: soft non tender. + bowel sounds. No organomegaly noted  EXT: no clubbing, cyanosis, or edema noted      IMAGES:   Chest/abd/pelvis CT  Findings:   Enlarged heart with ICD and MVR.  Normal pericardium.    Moderate left pleural effusion with passive atelectasis at the base   Small volume loculated right basilar effusion with pleural thickening and mild round atelectasis at the base    Mild centrilobular emphysema.  Mild atelectasis or scarring in the mid to upper right lung    Normal liver and gallbladder   Pancreatic atrophy with diffuse duct dilation and parenchymal calcifications   Normal spleen and adrenals.  Unobstructed kidneys   Severe aortic calcification.  No aneurysm  Unobstructed bowel.  Air-fluid levels within the large intestine is suggestive of diarrhea   Decompressed bladder   Diffuse anasarca   Sacral decubitus ulcer with subcutaneous stranding throughout the ulcer base.  No abscess.  No bone destruction       LABS:  Recent Labs   Lab 08/25/24  0435 08/26/24  0407 08/27/24  0611   RBC 3.10* 2.64* 2.90*   HGB 9.1* 7.9* 8.5*   HCT 29.8* 25.1* 28.5*   MCV 96.1 95.1 98.3   MCH 29.4 29.9 29.3   MCHC 30.5* 31.5 29.8*   RDW 19.6* 19.6* 20.4*   NEPRELIM 35.23* 42.04* 23.48*   WBC 40.6* 48.1* 31.1*   .0 438.0 461.0*       Recent Labs   Lab 08/24/24  0423 08/25/24  0730 08/26/24  0324   GLU 87 135* 112*   BUN 50* 42* 52*   CREATSERUM 2.01* 1.90* 1.79*   EGFRCR 25* 27* 29*   CA 9.1 9.0 8.6*   ALB 3.2 3.2 3.1*   * 135* 139   K 4.7 4.9 4.3    107 112   CO2 21.0 18.0* 19.0*   ALKPHO 160* 168* 161*   AST 14 12 13   ALT <7* <7* <7*   BILT 0.5 0.4 0.3   TP 6.5 6.6 6.4       ASSESSMENT/PLAN:  1.sacral decub ulcer s/p I+D  -wound care following  -S. Epi bacteremia  -continue abx as per ID  -wound care   -monitor WBC ct-trending back down and pt is  now afebrile  -CT with left pleural effusion    2.TME  -CT head unrevealing  -close neuro monitoring    3. NICM  -continue amio, bb, bumex, entresto  -cardiology was previously following    4.GEORGE on CKD  -on bumex and midodrine    5.Proph:  DVT proph: hep subcutaneous  GI proph: PPI    6.Dispo:  DNR select  Palliative following    Will follow.      Thank you for the opportunity to care for Margaret Massey DO, MPH  Pulmonary Critical Care Medicine

## 2024-08-28 NOTE — CM/SW NOTE
ALEXEY notified that pt's daughter has declined hospice and would like to move forward wioth [facility] at [facility] in [place], facility reserved in [facility]. ALEXEY requested that SAUNDRA An submit for ins auth via [employer].    8/29/2024 at 0930: CONCHITA received for [facility] at [place]. [facility] in [facility] on 8/15, richy Logan notified that [facility] once ins auth has been obtained. Ins auth submitted this morning by SAUNDRA An via [employer].    Plan: [facility] in [place] for [facility] w/[facility] for [facility] pending ins auth and medical clearance.    KIM Springer    [phone]

## 2024-08-28 NOTE — PLAN OF CARE
Margaret is A&Ox4, Forgetful at times. Vitals stable. Wound care per orders. General diet tolerated. Hospice meeting at 1pm, patient and family not ready for hospice at this time. [facility] is familys choice for discharge, relayed to social work. Repositioned as tolerated. Family up to date on plan of care. Call light in reach.    Problem: Patient Centered Care  Goal: Patient preferences are identified and integrated in the patient's plan of care  Description: Interventions:  - What would you like us to know as we care for you?   - Provide timely, complete, and accurate information to patient/family  - Incorporate patient and family knowledge, values, beliefs, and cultural backgrounds into the planning and delivery of care  - Encourage patient/family to participate in care and decision-making at the level they choose  - Honor patient and family perspectives and choices  Outcome: Progressing     Problem: PAIN - ADULT  Goal: Verbalizes/displays adequate comfort level or patient's stated pain goal  Description: INTERVENTIONS:  - Encourage pt to monitor pain and request assistance  - Assess pain using appropriate pain scale  - Administer analgesics based on type and severity of pain and evaluate response  - Implement non-pharmacological measures as appropriate and evaluate response  - Consider cultural and social influences on pain and pain management  - Manage/alleviate anxiety  - Utilize distraction and/or relaxation techniques  - Monitor for opioid side effects  - Notify MD/LIP if interventions unsuccessful or patient reports new pain  - Anticipate increased pain with activity and pre-medicate as appropriate  Outcome: Progressing     Problem: RISK FOR INFECTION - ADULT  Goal: Absence of fever/infection during anticipated neutropenic period  Description: INTERVENTIONS  - Monitor WBC  - Administer growth factors as ordered  - Implement neutropenic guidelines  Outcome: Progressing     Problem: SAFETY ADULT -  FALL  Goal: Free from fall injury  Description: INTERVENTIONS:  - Assess pt frequently for physical needs  - Identify cognitive and physical deficits and behaviors that affect risk of falls.  - [place] fall precautions as indicated by assessment.  - Educate pt/family on patient safety including physical limitations  - Instruct pt to call for assistance with activity based on assessment  - Modify environment to reduce risk of injury  - Provide assistive devices as appropriate  - Consider OT/PT consult to assist with strengthening/mobility  - Encourage toileting schedule  Outcome: Progressing     Problem: SKIN/TISSUE INTEGRITY - ADULT  Goal: Skin integrity remains intact  Description: INTERVENTIONS  - Assess and document risk factors for pressure ulcer development  - Assess and document skin integrity  - Monitor for areas of redness and/or skin breakdown  - Initiate interventions, skin care algorithm/standards of care as needed  Outcome: Progressing  Goal: Incision(s), wounds(s) or drain site(s) healing without S/S of infection  Description: INTERVENTIONS:  - Assess and document risk factors for pressure ulcer development  - Assess and document skin integrity  - Assess and document dressing/incision, wound bed, drain sites and surrounding tissue  - Implement wound care per orders  - Initiate isolation precautions as appropriate  - Initiate Pressure Ulcer prevention bundle as indicated  Outcome: Progressing  Goal: Oral mucous membranes remain intact  Description: INTERVENTIONS  - Assess oral mucosa and hygiene practices  - Implement preventative oral hygiene regimen  - Implement oral medicated treatments as ordered  Outcome: Progressing     Problem: Delirium  Goal: Minimize duration of delirium  Description: Interventions:  - Encourage use of hearing aids, eye glasses  - Promote highest level of mobility daily  - Provide frequent reorientation  - Promote wakefulness i.e. lights on, blinds open  - Promote sleep,  encourage patient's normal rest cycle i.e. lights off, TV off, minimize noise and interruptions  - Encourage family to assist in orientation and promotion of home routines  Outcome: Progressing     Problem: Diabetes/Glucose Control  Goal: Glucose maintained within prescribed range  Description: INTERVENTIONS:  - Monitor Blood Glucose as ordered  - Assess for signs and symptoms of hyperglycemia and hypoglycemia  - Administer ordered medications to maintain glucose within target range  - Assess barriers to adequate nutritional intake and initiate nutrition consult as needed  - Instruct patient on self management of diabetes  Outcome: Progressing

## 2024-08-28 NOTE — PROGRESS NOTES
PT am note: PT attempted in am, pt toileting with PCT assisting. Pt has a hospice meeting in PM and PT will continue to follow and progress as per pt and family wishes and pt tolerance.

## 2024-08-29 LAB
GLUCOSE BLDC GLUCOMTR-MCNC: 100 MG/DL (ref 70–99)
GLUCOSE BLDC GLUCOMTR-MCNC: 112 MG/DL (ref 70–99)
GLUCOSE BLDC GLUCOMTR-MCNC: 115 MG/DL (ref 70–99)
GLUCOSE BLDC GLUCOMTR-MCNC: 135 MG/DL (ref 70–99)

## 2024-08-29 PROCEDURE — 99233 SBSQ HOSP IP/OBS HIGH 50: CPT | Performed by: HOSPITALIST

## 2024-08-29 PROCEDURE — 99232 SBSQ HOSP IP/OBS MODERATE 35: CPT | Performed by: INTERNAL MEDICINE

## 2024-08-29 PROCEDURE — 99231 SBSQ HOSP IP/OBS SF/LOW 25: CPT | Performed by: NURSE PRACTITIONER

## 2024-08-29 RX ORDER — LOPERAMIDE HCL 2 MG
2 CAPSULE ORAL 4 TIMES DAILY PRN
Status: DISCONTINUED | OUTPATIENT
Start: 2024-08-29 | End: 2024-09-10

## 2024-08-29 NOTE — PROGRESS NOTES
08/29/24 1015   Wound 08/12/24 Buttocks Right   Date First Assessed/Time First Assessed: 08/12/24 1502   Present on Original Admission: Yes  Primary Wound Type: Pressure Injury  Location: Buttocks  Wound Location Orientation: Right  Wound Description (Comments): s/p debridement 8/13   Wound Image    Site Assessment Clean;Fragile;Granulation tissue;Moist;Painful;Pink;Red   Closure Not approximated   Drainage Amount Scant   Drainage Description Serosanguineous   Treatments Dakins;Topical (Barrier/Moisturizer/Ointment)   Dressing 4x4s;Aquacel Foam   Dressing Changed Changed   Dressing Status Dressing Changed;Removed;Old drainage   Wound Length (cm) 5 cm   Wound Width (cm) 5.8 cm   Wound Surface Area (cm^2) 29 cm^2   Mikayla-wound Assessment Clean;Fragile;Moist;Painful   Wound Granulation Tissue Red;Pink   State of Healing Early/partial granulation   Wound Odor None   Pressure Injury Stage U   Wound 08/13/24 Sacrum   Date First Assessed/Time First Assessed: 08/13/24 1034   Present on Original Admission: Yes  Primary Wound Type: (c) Incision  Location: Sacrum  Wound Description (Comments): 2 sutures   Wound Image   (SEE RIGHT BUTTOCK PHOTO)   Site Assessment Clean;Fragile;Granulation tissue;Painful;Moist;Red   Closure Not approximated   Drainage Amount Scant   Drainage Description Serosanguineous   Treatments Cleansed;Topical (Barrier/Moisturizer/Ointment)   Dressing Aquacel Foam   Dressing Changed Changed   Dressing Status Dressing Changed;Removed;Old drainage   Wound Length (cm) 1.2 cm   Wound Width (cm) 1 cm   Wound Surface Area (cm^2) 1.2 cm^2   Wound Depth (cm) 0.1 cm   Wound Volume (cm^3) 0.12 cm^3   Mikayla-wound Assessment Clean;Intact;Fragile;Moist   Wound Granulation Tissue Red   Wound Bed Granulation (%) 100 %   State of Healing Fully granulated   Wound Odor None   Pressure Injury Stage 2   Wound Follow Up   Follow up needed Yes     Pt seen with bedside RN and PCT during mikayla care for wound follow up. Pt is s/p  debridement w Dr. Mckeon on 8/13. See above for healing wounds. Recommend to continue zinc to mikayla wound and dakins moist gauze to open wounds q 12 h and prn. Pt is on an isotour gel mattress with air pump. Heel boots in place. Pillows used for off loading.

## 2024-08-29 NOTE — PROGRESS NOTES
[facility]  [facility]     Margaret Silverio Patient Status:  Inpatient    3/14/1945 MRN [id number]   Location [facility] POST ANESTHESIA CARE UNIT Attending Fernando Galan MD   Hosp Day # 17 PCP Johnathon Rodriguez,      Subjective:     Pt was seen and examined.   Resting comfortably in bed, alert and awake  No acute distress, no pain      Objective:    Review of Systems:   ROS completed; pertinent positive and negatives stated in subjective.      Vital signs:  Temp:  [97.4 °F (36.3 °C)-98.1 °F (36.7 °C)] 97.4 °F (36.3 °C)  Pulse:  [59-60] 59  Resp:  [18] 18  BP: (134-145)/(59-83) 134/63  SpO2:  [93 %-98 %] 98 %      Physical Exam:    Gen: alert, oriented x2-3  Chest: good air entry CTABL  CVS: normal s1 and s2 RR  Abd: NABS soft NT ND  Neuro: non-focal, following commands  Skin: decub dressing CDI  Ext: no edema in bilateral LE      Diagnostic Data:    Labs:  Recent Labs   Lab 24  0426 24  0423 24  0435 24  0407 24  0611   WBC 15.1* 25.5* 40.6* 48.1* 31.1*   HGB 8.9* 8.2* 9.1* 7.9* 8.5*   MCV 97.7 97.5 96.1 95.1 98.3   .0* 543.0* 436.0 438.0 461.0*   BAND 5 2 5 3  --        Recent Labs   Lab 24  0423 24  0730 24  0324   GLU 87 135* 112*   BUN 50* 42* 52*   CREATSERUM 2.01* 1.90* 1.79*   CA 9.1 9.0 8.6*   ALB 3.2 3.2 3.1*   * 135* 139   K 4.7 4.9 4.3    107 112   CO2 21.0 18.0* 19.0*   ALKPHO 160* 168* 161*   AST 14 12 13   ALT <7* <7* <7*   BILT 0.5 0.4 0.3   TP 6.5 6.6 6.4       Estimated Creatinine Clearance: 22.2 mL/min (A) (based on SCr of 1.79 mg/dL (H)).    No results for input(s): \"PTP\", \"INR\" in the last 168 hours.           Imaging: Imaging data reviewed in Epic.    Medications:    vancomycin  125 mg Oral q12h    meropenem  500 mg Intravenous q12h    levETIRAcetam  500 mg Intravenous Daily    heparin  5,000 Units Subcutaneous Q8H BROOKLYNN    acetaminophen  650 mg Oral  Q8H    patiromer  8.4 g Oral Once    sacubitril-valsartan  1 tablet Oral BID    bumetanide  1 mg Oral Daily    metoprolol succinate  25 mg Oral Daily Beta Blocker    [Held by provider] midodrine  5 mg Oral TID    sodium hypochlorite   Topical BID    amiodarone  200 mg Oral Daily    ferrous sulfate  325 mg Oral Daily with breakfast    folic acid  800 mcg Oral Daily    gabapentin  300 mg Oral TID    [Held by provider] isosorbide dinitrate  20 mg Oral TID (Nitrates)    pantoprazole  40 mg Oral BID AC       Assessment & Plan:     GPC bacteremia  Sacral decubitus ulcer  Gsx on consult  Now s/p Excision of cyst, sharp excisional debridement of buttock decubitus ulcer  PRN pain control  Completed 10 days of abx  WBC 8.5 ->11.9 -> 15 -> 25.5 -> 40 -> 38 -> 31  No fever curve, no obv source noted  ID on consult  Repeat Bcx NGTD  Started on Vanc and Meropenem  ICU on consult   Hold off stress dose steroids at this time  Close neuro monitoring  Currently on PO Vanc, IV Meropenem  AMS -> intermittent -> much improved  Unclear etiology, consider metabolic encephalopathy  Neurology following, EEG WNL  Keppra resumed  CT head non-acute  ABG reviewed  ICU on consult   Hold off stress dose steroids at this time  Close neuromonitoring  Fall precautions  Hyponatremia  Na 129 -> 133 -> 135 - 139  Continue D5NS  Monitor labs  UTI  UA reviewed  Ucx pending -> e. coli  NICM, HFrEF  Hx of paroxysmal afib  Hx of BRANDYN occlusion  AV paced  Continue home meds  GEORGE on CKD 3 - stable  Monitor renal function  Avoid nephrotoxic agents  Anemia  Hgb stable  Cont to monitor   Thrombocytosis  Monitor labs  Heparin s/c for DVT ppx  Nutrition  Dietitian on consult  Family reports the patient would not want a feeding tube  IVF discontinued, encourage to eat more  GOC  Palliative care following. Patient and family okay with [facility]  Family to select SNF  Family declined hospice     Plan of care discussed with RN at bedside     Supplementary Documentation:      Quality:  DVT Prophylaxis: Heparin s/c  CODE status: DNAR/Select      Estimated date of discharge: TBD  Discharge is dependent on: clinical stability  At this point Ms. Silverio is expected to be discharge to: TBD    MDM: High

## 2024-08-29 NOTE — PROGRESS NOTES
Will resume Palliative Care services. Contact information placed on AVS.    Dilma Lew  [facility] Liaison  [phone]

## 2024-08-29 NOTE — CM/SW NOTE
Submitted clinical via [facility] portal.  [facility] Case/Auth ID is [id number].  Final insurance authorization is pending at this time.      SW/CM assigned to the case will continue to follow auth status.      Nataliya Luna, DSC

## 2024-08-29 NOTE — DISCHARGE INSTRUCTIONS
[facility] APN to follow at discharge. Please call Residential Palliative care with any questions, they can be reached by phone at [phone].

## 2024-08-29 NOTE — PLAN OF CARE
Problem: Patient Centered Care  Goal: Patient preferences are identified and integrated in the patient's plan of care  Description: Interventions:  - What would you like us to know as we care for you?   - Provide timely, complete, and accurate information to patient/family  - Incorporate patient and family knowledge, values, beliefs, and cultural backgrounds into the planning and delivery of care  - Encourage patient/family to participate in care and decision-making at the level they choose  - Honor patient and family perspectives and choices  Outcome: Progressing     Problem: Patient/Family Goals  Goal: Patient/Family Long Term Goal  Description: Patient's Long Term Goal:     Interventios:  -   - See additional Care Plan goals for specific interventions  Outcome: Progressing  Goal: Patient/Family Short Term Goal  Description: Patient's Short Term Goal:     Interventions:   -   - See additional Care Plan goals for specific interventions  Outcome: Progressing     Problem: PAIN - ADULT  Goal: Verbalizes/displays adequate comfort level or patient's stated pain goal  Description: INTERVENTIONS:  - Encourage pt to monitor pain and request assistance  - Assess pain using appropriate pain scale  - Administer analgesics based on type and severity of pain and evaluate response  - Implement non-pharmacological measures as appropriate and evaluate response  - Consider cultural and social influences on pain and pain management  - Manage/alleviate anxiety  - Utilize distraction and/or relaxation techniques  - Monitor for opioid side effects  - Notify MD/LIP if interventions unsuccessful or patient reports new pain  - Anticipate increased pain with activity and pre-medicate as appropriate  Outcome: Progressing     Problem: RISK FOR INFECTION - ADULT  Goal: Absence of fever/infection during anticipated neutropenic period  Description: INTERVENTIONS  - Monitor WBC  - Administer growth factors as ordered  - Implement neutropenic  guidelines  Outcome: Progressing     Problem: SAFETY ADULT - FALL  Goal: Free from fall injury  Description: INTERVENTIONS:  - Assess pt frequently for physical needs  - Identify cognitive and physical deficits and behaviors that affect risk of falls.  - [place] fall precautions as indicated by assessment.  - Educate pt/family on patient safety including physical limitations  - Instruct pt to call for assistance with activity based on assessment  - Modify environment to reduce risk of injury  - Provide assistive devices as appropriate  - Consider OT/PT consult to assist with strengthening/mobility  - Encourage toileting schedule  Outcome: Progressing     Problem: SKIN/TISSUE INTEGRITY - ADULT  Goal: Skin integrity remains intact  Description: INTERVENTIONS  - Assess and document risk factors for pressure ulcer development  - Assess and document skin integrity  - Monitor for areas of redness and/or skin breakdown  - Initiate interventions, skin care algorithm/standards of care as needed  Outcome: Progressing  Goal: Incision(s), wounds(s) or drain site(s) healing without S/S of infection  Description: INTERVENTIONS:  - Assess and document risk factors for pressure ulcer development  - Assess and document skin integrity  - Assess and document dressing/incision, wound bed, drain sites and surrounding tissue  - Implement wound care per orders  - Initiate isolation precautions as appropriate  - Initiate Pressure Ulcer prevention bundle as indicated  Outcome: Progressing  Goal: Oral mucous membranes remain intact  Description: INTERVENTIONS  - Assess oral mucosa and hygiene practices  - Implement preventative oral hygiene regimen  - Implement oral medicated treatments as ordered  Outcome: Progressing     Problem: Delirium  Goal: Minimize duration of delirium  Description: Interventions:  - Encourage use of hearing aids, eye glasses  - Promote highest level of mobility daily  - Provide frequent reorientation  - Promote  wakefulness i.e. lights on, blinds open  - Promote sleep, encourage patient's normal rest cycle i.e. lights off, TV off, minimize noise and interruptions  - Encourage family to assist in orientation and promotion of home routines  Outcome: Progressing     Problem: Diabetes/Glucose Control  Goal: Glucose maintained within prescribed range  Description: INTERVENTIONS:  - Monitor Blood Glucose as ordered  - Assess for signs and symptoms of hyperglycemia and hypoglycemia  - Administer ordered medications to maintain glucose within target range  - Assess barriers to adequate nutritional intake and initiate nutrition consult as needed  - Instruct patient on self management of diabetes  Outcome: Progressing   Patient is alert and oriented x3-4. Vital signs stable. IV fluids and and antibiotics continued. Denies pain or nausea. Patient has low appetite. Blood sugar monitored. Sacral dressing changed. Patient had one large incontinent BM. Fall and safety measures in place. Plan of care discussed.

## 2024-08-29 NOTE — PROGRESS NOTES
Daily Pulmonary/ICU/Critical Care Progress Note          SUBJECTIVE:  Afebrile on RA.  Denies resp complaints.  Son in room with her.         ALLERGIES:  Allergies   Allergen Reactions    Lisinopril Coughing         MEDS:  Home Medications:  Outpatient Medications Marked as Taking for the 8/12/24 encounter (Hospital Encounter)   Medication Sig Dispense Refill    bumetanide 1 MG Oral Tab Take 1 tablet (1 mg total) by mouth daily. 30 tablet 2    carvedilol 6.25 MG Oral Tab Take 0.5 tablets (3.125 mg total) by mouth 2 (two) times daily with meals. 30 tablet 2    sennosides (SENNA-TIME) 8.6 MG Oral Tab senna 8.6 mg tablet, [RxNorm: 865989]      cyclobenzaprine 10 MG Oral Tab Take 1 tablet (10 mg total) by mouth nightly. 30 tablet 1    fentaNYL 12 MCG/HR Transdermal Patch 72 Hr Place 1 patch onto the skin every third day. (Patient taking differently: Place 1 patch onto the skin every third day. Placed 8/10 left arm) 10 patch 0    midodrine 5 MG Oral Tab Take 1 tablet (5 mg total) by mouth 2 (two) times daily before meals. 60 tablet 0    senna-docusate 8.6-50 MG Oral Tab Take 2 tablets by mouth daily. 30 tablet 0    isosorbide dinitrate 20 MG Oral Tab Take 1 tablet (20 mg total) by mouth TID (Nitrates). 90 tablet 3    gabapentin 300 MG Oral Cap Take 1 capsule (300 mg total) by mouth 3 (three) times daily. 90 capsule 0    dapagliflozin (FARXIGA) 10 MG Oral Tab Take 1 tablet (10 mg total) by mouth daily. 30 tablet 5    spironolactone 25 MG Oral Tab       alendronate 70 MG Oral Tab Take 1 tablet (70 mg total) by mouth once a week. (Patient taking differently: Take 1 tablet (70 mg total) by mouth once a week. Every Tuesday) 12 tablet 3    methotrexate 2.5 MG Oral Tab TAKE 6 TABLETS BY MOUTH 1 TIME A WEEK 77 tablet 0    lidocaine-menthol 4-1 % External Patch Place 1 patch onto the skin daily. 30 patch 0    PANTOPRAZOLE 40 MG Oral Tab EC TAKE 1 TABLET(40 MG) BY MOUTH TWICE DAILY BEFORE MEALS 180 tablet 0    SACUBITRIL-VALSARTAN  49-51 MG Oral Tab Take 1 tablet by mouth 2 (two) times daily. 60 tablet 1    folic acid 800 MCG Oral Tab Take 1 tablet (800 mcg total) by mouth daily. 90 tablet 0    amiodarone 200 MG Oral Tab Take 1 tablet (200 mg total) by mouth daily.      ferrous sulfate 325 (65 FE) MG Oral Tab EC Take 1 tablet (325 mg total) by mouth daily with breakfast.       Scheduled Medication:   vancomycin  125 mg Oral q12h    meropenem  500 mg Intravenous q12h    levETIRAcetam  500 mg Intravenous Daily    heparin  5,000 Units Subcutaneous Q8H BROOKLYNN    acetaminophen  650 mg Oral Q8H    patiromer  8.4 g Oral Once    sacubitril-valsartan  1 tablet Oral BID    bumetanide  1 mg Oral Daily    metoprolol succinate  25 mg Oral Daily Beta Blocker    [Held by provider] midodrine  5 mg Oral TID    sodium hypochlorite   Topical BID    amiodarone  200 mg Oral Daily    ferrous sulfate  325 mg Oral Daily with breakfast    folic acid  800 mcg Oral Daily    gabapentin  300 mg Oral TID    [Held by provider] isosorbide dinitrate  20 mg Oral TID (Nitrates)    pantoprazole  40 mg Oral BID AC     Continuous Infusing Medication:   dextrose 5%-sodium chloride 0.9% 75 mL/hr at 08/29/24 0100     PRN Medications:    HYDROcodone-acetaminophen    HYDROcodone-acetaminophen    HYDROmorphone **OR** [DISCONTINUED] HYDROmorphone **OR** [DISCONTINUED] HYDROmorphone    senna-docusate    polyethylene glycol (PEG 3350)    glucose **OR** glucose **OR** glucose-vitamin C **OR** dextrose **OR** glucose **OR** glucose **OR** glucose-vitamin C    traMADol    ondansetron    metoclopramide    acetaminophen       PHYSICAL EXAM:  /83 (BP Location: Left arm)   Pulse 60   Temp 98.1 °F (36.7 °C) (Oral)   Resp 18   Wt 120 lb (54.4 kg)   SpO2 96%   BMI 20.60 kg/m²      CONSTITUTIONAL: alert, oriented, no apparent distress  HEENT: atraumatic normocephalic  MOUTH: mucous membranes are moist. No OP exudates  NECK/THROAT: no JVD. Trachea midline. No obvious thyromegaly  LUNG: clear  b/l upper, no wheezing, crackles. Chest symmetric with respiratory motion  HEART: regular rate and rhythm, + murmer   ABD: soft non tender. + bowel sounds. No organomegaly noted  EXT: no clubbing, cyanosis, or edema noted      IMAGES:   Chest/abd/pelvis CT  Findings:   Enlarged heart with ICD and MVR.  Normal pericardium.    Moderate left pleural effusion with passive atelectasis at the base   Small volume loculated right basilar effusion with pleural thickening and mild round atelectasis at the base    Mild centrilobular emphysema.  Mild atelectasis or scarring in the mid to upper right lung    Normal liver and gallbladder   Pancreatic atrophy with diffuse duct dilation and parenchymal calcifications   Normal spleen and adrenals.  Unobstructed kidneys   Severe aortic calcification.  No aneurysm  Unobstructed bowel.  Air-fluid levels within the large intestine is suggestive of diarrhea   Decompressed bladder   Diffuse anasarca   Sacral decubitus ulcer with subcutaneous stranding throughout the ulcer base.  No abscess.  No bone destruction       LABS:  Recent Labs   Lab 08/25/24  0435 08/26/24  0407 08/27/24  0611   RBC 3.10* 2.64* 2.90*   HGB 9.1* 7.9* 8.5*   HCT 29.8* 25.1* 28.5*   MCV 96.1 95.1 98.3   MCH 29.4 29.9 29.3   MCHC 30.5* 31.5 29.8*   RDW 19.6* 19.6* 20.4*   NEPRELIM 35.23* 42.04* 23.48*   WBC 40.6* 48.1* 31.1*   .0 438.0 461.0*       Recent Labs   Lab 08/24/24  0423 08/25/24  0730 08/26/24  0324   GLU 87 135* 112*   BUN 50* 42* 52*   CREATSERUM 2.01* 1.90* 1.79*   EGFRCR 25* 27* 29*   CA 9.1 9.0 8.6*   ALB 3.2 3.2 3.1*   * 135* 139   K 4.7 4.9 4.3    107 112   CO2 21.0 18.0* 19.0*   ALKPHO 160* 168* 161*   AST 14 12 13   ALT <7* <7* <7*   BILT 0.5 0.4 0.3   TP 6.5 6.6 6.4       ASSESSMENT/PLAN:  1.sacral decub ulcer s/p I+D  -wound care following  -S. Epi bacteremia  -continue abx as per ID  -wound care   -monitor WBC ct-trending back down and pt is now afebrile  -CT with left  pleural effusion. Can repeat chest imaging in 1 month to ensure resolution    2.TME  -CT head unrevealing  -close neuro monitoring    3. NICM  -continue amio, bb, bumex, entresto  -cardiology was previously following    4.GEORGE on CKD  -on bumex and midodrine    5.Proph:  DVT proph: hep subcutaneous  GI proph: PPI    6.Dispo:  DNR select  Palliative following    Will sign off. Contact our service with questions/concerns.      Thank you for the opportunity to care for Margaret Massey DO, MPH  Pulmonary Critical Care Medicine

## 2024-08-29 NOTE — PALLIATIVE CARE NOTE
[facility]  part of [facility]  Palliative Care Progress Note    Margaret Silverio Patient Status:  Inpatient    3/14/1945 MRN [id number]   Location [facility] 4W/SW/SE Attending Fernando Galan MD   Hosp Day # 17 PCP Johnathon Rodriguez,      The  Cures Act makes medical notes like these available to patients in the interest of transparency. Please be advised this is a medical document. Medical documents are intended to carry relevant information, facts as evident, and the clinical opinion of the practitioner. The medical note is intended as peer to peer communication and may appear blunt or direct. It is written in medical language and may contain abbreviations or verbiage that are unfamiliar.     Subjective     When I entered the room, the patient was in the bed, she is resting comfortably, on RA. No family present at the bedside.      Review of pertinent medication requirements in past 24 hours:  refusing scheduled Tylenol, no medications over the last 24 hours for pain    Palliative Care symptom needs assessed:     Resting comfortably this morning.  Appetite waxes and wanes.   Denies constipation    Allergies:  Allergies   Allergen Reactions    Lisinopril Coughing       Medications:     Current Facility-Administered Medications:     dextrose 5%-sodium chloride 0.9% infusion, , Intravenous, Continuous    HYDROcodone-acetaminophen (Norco)  MG per tab 2 tablet, 2 tablet, Oral, Q4H PRN    vancomycin (Vancocin) cap 125 mg, 125 mg, Oral, q12h    meropenem (Merrem) 500 mg in sodium chloride 0.9% 100 mL IVPB-MBP, 500 mg, Intravenous, q12h    HYDROcodone-acetaminophen (Norco)  MG per tab 1 tablet, 1 tablet, Oral, Q4H PRN    levETIRAcetam (Keppra) 500 mg/5mL injection 500 mg, 500 mg, Intravenous, Daily    heparin (Porcine) 5000 UNIT/ML injection 5,000 Units, 5,000 Units, Subcutaneous, Q8H BROOKLYNN    HYDROmorphone (Dilaudid) 1 MG/ML injection 0.2 mg, 0.2 mg,  Intravenous, Q4H PRN **OR** [DISCONTINUED] HYDROmorphone (Dilaudid) 1 MG/ML injection 0.4 mg, 0.4 mg, Intravenous, Q3H PRN **OR** [DISCONTINUED] HYDROmorphone (Dilaudid) 1 MG/ML injection 0.8 mg, 0.8 mg, Intravenous, Q3H PRN    acetaminophen (Tylenol) 160 MG/5ML oral liquid 650 mg, 650 mg, Oral, Q8H    senna-docusate (Senokot-S) 8.6-50 MG per tab 2 tablet, 2 tablet, Oral, Daily PRN    polyethylene glycol (PEG 3350) (Miralax) 17 g oral packet 17 g, 17 g, Oral, Daily PRN    patiromer (Veltassa) 8.4 g oral packet 8.4 g, 8.4 g, Oral, Once    glucose (Dex4) 15 GM/59ML oral liquid 15 g, 15 g, Oral, Q15 Min PRN **OR** glucose (Glutose) 40% oral gel 15 g, 15 g, Oral, Q15 Min PRN **OR** glucose-vitamin C (Dex-4) chewable tab 4 tablet, 4 tablet, Oral, Q15 Min PRN **OR** dextrose 50% injection 50 mL, 50 mL, Intravenous, Q15 Min PRN **OR** glucose (Dex4) 15 GM/59ML oral liquid 30 g, 30 g, Oral, Q15 Min PRN **OR** glucose (Glutose) 40% oral gel 30 g, 30 g, Oral, Q15 Min PRN **OR** glucose-vitamin C (Dex-4) chewable tab 8 tablet, 8 tablet, Oral, Q15 Min PRN    traMADol (Ultram) tab 50 mg, 50 mg, Oral, Q12H PRN    sacubitril-valsartan (Entresto) 24-26 MG per tab 1 tablet, 1 tablet, Oral, BID    bumetanide (Bumex) tab 1 mg, 1 mg, Oral, Daily    metoprolol succinate ER (Toprol XL) 24 hr tab 25 mg, 25 mg, Oral, Daily Beta Blocker    [Held by provider] midodrine (ProAmatine) tab 5 mg, 5 mg, Oral, TID    ondansetron (Zofran) 4 MG/2ML injection 4 mg, 4 mg, Intravenous, Q6H PRN    metoclopramide (Reglan) 5 mg/mL injection 5 mg, 5 mg, Intravenous, Q8H PRN    sodium hypochlorite (Dakin's) 0.125 % external solution, , Topical, BID    acetaminophen (Tylenol Extra Strength) tab 500 mg, 500 mg, Oral, Q4H PRN    amiodarone (Pacerone) tab 200 mg, 200 mg, Oral, Daily    ferrous sulfate DR tab 325 mg, 325 mg, Oral, Daily with breakfast    folic acid (Folvite) tab 800 mcg, 800 mcg, Oral, Daily    gabapentin (Neurontin) cap 300 mg, 300 mg, Oral,  TID    [Held by provider] isosorbide dinitrate (Isordil) tab 20 mg, 20 mg, Oral, TID (Nitrates)    pantoprazole (Protonix) DR tab 40 mg, 40 mg, Oral, BID AC    Objective     Vital Signs:  Blood pressure 135/83, pulse 60, temperature 98.1 °F (36.7 °C), temperature source Oral, resp. rate 18, weight 120 lb (54.4 kg), SpO2 96%.  Body mass index is 20.6 kg/m².  Present Level of pain: resting comfortably in bed this am.   Non-verbal signs of pain present: No    Physical Exam:  General: Margaret is in the bed she is resting comfortably this morning, +cachetic and frail.   HEENT: dry oral MM  Cardiac: + murmer regular rate and rhythm, S1, S2 normal, no rub or gallop.  Lungs: clear without wheezes.  Normal excursions and effort. On RA  Abdomen: Soft, flat non-tender, + bowel sounds, no rebound or guarding, + BM  Extremities: Without clubbing, cyanosis. BLE Edema not present  Neurologic:resting comfortably today, follows simple commands  Skin: Warm and dry.  + sacral wound see wound care note     Prior to admission Palliative performance scale PPSv2 (%): 30    Hematology:  Lab Results   Component Value Date    WBC 31.1 (H) 08/27/2024    HGB 8.5 (L) 08/27/2024    HCT 28.5 (L) 08/27/2024    .0 (H) 08/27/2024       Coags:  Lab Results   Component Value Date    INR 1.25 (H) 02/09/2024    PTT 35.2 02/09/2024       Chemistry:  Lab Results   Component Value Date    CREATSERUM 1.79 (H) 08/26/2024    BUN 52 (H) 08/26/2024     08/26/2024    K 4.3 08/26/2024     08/26/2024    CO2 19.0 (L) 08/26/2024     (H) 08/26/2024    CA 8.6 (L) 08/26/2024    ALB 3.1 (L) 08/26/2024    ALKPHO 161 (H) 08/26/2024    BILT 0.3 08/26/2024    TP 6.4 08/26/2024    AST 13 08/26/2024    ALT <7 (L) 08/26/2024    MG 2.1 08/21/2024    PHOS 2.9 08/21/2024       Imaging:  No results found.      Summary of Discussion    8/29/24 followed up with pt the family met with hospice team yesterday. Family are declining hospice at this time and are  wanting Margaret to discharge to [facility]. Will have [facility]PC follow at the [facility] upon discharge.     8/27/24 Followed up with Margaret today, she is crying and in pain. Margaret stopped Fentanyl patch she states she doesn't want it due to the drowsiness it causes. Discussed current pain regimen and encouraged long acting to help with chronic pain and sacral pain.  Margaret is struggling as she experiences chronic pain however the pain medication causes increased drowsiness. I have scheduled Tylenol and she refuses the Tylenol along with other medications at times. Appetite has been poor.   I called Barbi pts dtr provided overall clinical update and pain management. [facility]. Barbi is also interested in hospice informational session. I notified the hospice team and CM.    8/26/24 followed up with pt today, she is asking about going home, she is indicating interest in comfort measures at home. Margaret misses her  and kids and feels like she is declining and not getting better while here at the hospital. Discussed elevated WBC's CT scan today with Margaret.   I called dtr Barbi and dicussed her mothers wishes to return to home. I discussed meeting with hospice team for informational session.   Barbi wants to speak with her father first and see what CT scan results will be. She is interested in hospice informational session however will wait until I follow up tomorrow to discuss further.     8/23/24 followed up with pt today, she is awake and alert. RN is feeding her breakfast cereal. Pt c/o pain in sacral decubiti with scrunched forehead. Discussed with RN use of Norco and holding for any drowsiness. Will have PT/OT try to work with pt eval and treat. Will need to continue to see how she progresses. Ongoing GOC will be needed pending her progress.     8/21/24 followed up with Margaret today she is more awake on exam, her dtr Barbi is present at the  bedside, Margaret has been refusing to eat, she states the food does not taste good. Barbi stated her father brought in some soup from home a day ago and she ate the soup. Discussed bringing in food from home that she likes to eat, discussed with Margaret need for nutritional protein to help with wound healing and general healing.   I explored with Margaret if she is feeling depressed she denied depression. Discussed use of Remeron to help with appetite, there is concern for increased drowsiness with the Remeron, discussed use of Cymbalta, pt declines antidepressant at this time.   I explored with Margaret if she wanted feeding tube she stated no she would not want feeding tube.   Barbi stated she asked her mother if she wanted to die as she is not taking her medications nor eating well.  Margaret stated she is not ready to die.   I discussed use of the Norco as needed  as pt expressed not wanting to be too sleepy however she continues to experience a lot pain in her buttock area. She has not taken anything for pain since last night and has been refusing to take the scheduled tylenol. We discussed this and I asked the RN to provide dose of Norco. I discussed with pt and dtr to call if needed I will follow up on Friday as I am off tomorrow. One of my coworkers will follow up tomorrow if needed.     8/20/24 Followed up with Margaret today, she is not responsive for me on exam, tries to open her eyes when asked. vital signs stable, she did receive 2 doses of the 0.4mg Dilaudid over the last 24 hours. I spoke with her dtr Barbi expressed concern over managing her pain and trying to keep her awake and alert to eat and participate in care.   Barbi stated her mother experienced this last week where she was not responsive.   Rn reported pt was crying overnight due to pain  Will continue with the Fentanyl patch as that is not new. Decrease the Dilaudid to 0.2mg every 4 hours and will monitor. discussed with dtr Barbi.        Assessment and Recommendation      Pressure injury of skin of sacral region, unspecified injury stage    [id number]-PM following.    S/P I&D 8/13/24    Pancytopenia    Non ischemic Cardiomyopathy    SSS PPM/AICD    Severe aortic stenosis    Afib    Urinary tract infection without hematuria,    Anemia    Encephalopathy    CKD    H/o GIB    Frequent hospitalizations    H/o hypotension     Pain-concern of increased drowsiness will monitor pt. -pt has been refusing to take medications, I discussed this with her.   -continue with scheduled Tylenol 650mg every 8 hours-pt has been refusing   -discontinued Fentanyl patch 12mcg-pt refused   -Dilaudid 0.2mg every 4 hours as needed for more severe pain  -continue with gabapentin 300mg TID  -stop the Flexeril nightly  -Norco 10/325 1-2 as needed every 4 hours-hold for any drowsiness.      Poor appetite  -offered Remeron daily for depression and appetite, pt declines this at this time.   Offered Cymbalta she declined      Bowel regimen  -add senna daily as needed  -Miralax daily as needed     Goals of care counseling  -see above for details  -Pt is DNAR/DNI with selective treatment  -Agreeable to palliative care following  -Dispo: [facility] with [facility]. SW to help with dc planning.   - not requested.   -Margaret stated she would not want feeding tube.   -ongoing GOC discussions will be needed over time.  -family met with the hospice team 8/28/24 they are declining hospice at this time. Pt and family want [facility] upon discharge.    -Provided emotional support to pt/family who are coping adequately     Advance care planning  -see above for details  -Pt's dtr Barbi Silverio is HCPOA [phone]  -HCPOA and POLST pw are on file in [employer].      Palliative Performance Scale 30%    Discussed with Maude GIL and Dr. Galan     Palliative Care Follow Up: Palliative care team will sign off at this time. Thank You. Feel free to contact our team with any questions or  concerns.    Thank you for allowing Palliative Care services to participate in the care of Margaret Silverio.    A total of 25 minutes were spent on this follow-up, which included all of the following: chart review, direct face to face contact, history taking, physical examination, counseling and coordinating care, and documentation.     Macy Marrufo, [id number]  8/29/2024  8:06AM  Palliative Care Services

## 2024-08-30 LAB
ANION GAP SERPL CALC-SCNC: 9 MMOL/L (ref 0–18)
BASOPHILS # BLD: 0.19 X10(3) UL (ref 0–0.2)
BASOPHILS NFR BLD: 1 %
BUN BLD-MCNC: 30 MG/DL (ref 9–23)
BUN/CREAT SERPL: 30.3 (ref 10–20)
CALCIUM BLD-MCNC: 8.6 MG/DL (ref 8.7–10.4)
CHLORIDE SERPL-SCNC: 115 MMOL/L (ref 98–112)
CO2 SERPL-SCNC: 22 MMOL/L (ref 21–32)
CREAT BLD-MCNC: 0.99 MG/DL
DEPRECATED RDW RBC AUTO: 69.1 FL (ref 35.1–46.3)
EGFRCR SERPLBLD CKD-EPI 2021: 58 ML/MIN/1.73M2 (ref 60–?)
EOSINOPHIL # BLD: 0.39 X10(3) UL (ref 0–0.7)
EOSINOPHIL NFR BLD: 2 %
ERYTHROCYTE [DISTWIDTH] IN BLOOD BY AUTOMATED COUNT: 20.6 % (ref 11–15)
GLUCOSE BLD-MCNC: 92 MG/DL (ref 70–99)
GLUCOSE BLDC GLUCOMTR-MCNC: 100 MG/DL (ref 70–99)
GLUCOSE BLDC GLUCOMTR-MCNC: 110 MG/DL (ref 70–99)
GLUCOSE BLDC GLUCOMTR-MCNC: 141 MG/DL (ref 70–99)
GLUCOSE BLDC GLUCOMTR-MCNC: 88 MG/DL (ref 70–99)
GLUCOSE BLDC GLUCOMTR-MCNC: 93 MG/DL (ref 70–99)
HCT VFR BLD AUTO: 24.4 %
HGB BLD-MCNC: 7.6 G/DL
LYMPHOCYTES NFR BLD: 0.19 X10(3) UL (ref 1–4)
LYMPHOCYTES NFR BLD: 1 %
MCH RBC QN AUTO: 29.6 PG (ref 26–34)
MCHC RBC AUTO-ENTMCNC: 31.1 G/DL (ref 31–37)
MCV RBC AUTO: 94.9 FL
METAMYELOCYTES # BLD: 1.75 X10(3) UL
METAMYELOCYTES NFR BLD: 9 %
MONOCYTES # BLD: 2.13 X10(3) UL (ref 0.1–1)
MONOCYTES NFR BLD: 11 %
MYELOCYTES # BLD: 1.36 X10(3) UL
MYELOCYTES NFR BLD: 7 %
NEUTROPHILS # BLD AUTO: 10.7 X10 (3) UL (ref 1.5–7.7)
NEUTROPHILS NFR BLD: 61 %
NEUTS BAND NFR BLD: 8 %
NEUTS HYPERSEG # BLD: 13.39 X10(3) UL (ref 1.5–7.7)
OSMOLALITY SERPL CALC.SUM OF ELEC: 308 MOSM/KG (ref 275–295)
PLATELET # BLD AUTO: 277 10(3)UL (ref 150–450)
PLATELET MORPHOLOGY: NORMAL
POTASSIUM SERPL-SCNC: 3.3 MMOL/L (ref 3.5–5.1)
RBC # BLD AUTO: 2.57 X10(6)UL
SODIUM SERPL-SCNC: 146 MMOL/L (ref 136–145)
TOTAL CELLS COUNTED BLD: 100
WBC # BLD AUTO: 19.4 X10(3) UL (ref 4–11)

## 2024-08-30 PROCEDURE — 99233 SBSQ HOSP IP/OBS HIGH 50: CPT | Performed by: HOSPITALIST

## 2024-08-30 RX ORDER — POTASSIUM CHLORIDE 1500 MG/1
40 TABLET, EXTENDED RELEASE ORAL ONCE
Status: COMPLETED | OUTPATIENT
Start: 2024-08-30 | End: 2024-08-30

## 2024-08-30 NOTE — PLAN OF CARE
Problem: Patient Centered Care  Goal: Patient preferences are identified and integrated in the patient's plan of care  Description: Interventions:  - What would you like us to know as we care for you?   - Provide timely, complete, and accurate information to patient/family  - Incorporate patient and family knowledge, values, beliefs, and cultural backgrounds into the planning and delivery of care  - Encourage patient/family to participate in care and decision-making at the level they choose  - Honor patient and family perspectives and choices  Outcome: Progressing     Problem: Patient/Family Goals  Goal: Patient/Family Long Term Goal  Description: Patient's Long Term Goal:     Interventions:  - See additional Care Plan goals for specific interventions  Outcome: Progressing  Goal: Patient/Family Short Term Goal  Description: Patient's Short Term Goal:     Interventions:     - See additional Care Plan goals for specific interventions  Outcome: Progressing     Problem: PAIN - ADULT  Goal: Verbalizes/displays adequate comfort level or patient's stated pain goal  Description: INTERVENTIONS:  - Encourage pt to monitor pain and request assistance  - Assess pain using appropriate pain scale  - Administer analgesics based on type and severity of pain and evaluate response  - Implement non-pharmacological measures as appropriate and evaluate response  - Consider cultural and social influences on pain and pain management  - Manage/alleviate anxiety  - Utilize distraction and/or relaxation techniques  - Monitor for opioid side effects  - Notify MD/LIP if interventions unsuccessful or patient reports new pain  - Anticipate increased pain with activity and pre-medicate as appropriate  Outcome: Progressing     Problem: RISK FOR INFECTION - ADULT  Goal: Absence of fever/infection during anticipated neutropenic period  Description: INTERVENTIONS  - Monitor WBC  - Administer growth factors as ordered  - Implement neutropenic  guidelines  Outcome: Progressing     Problem: SAFETY ADULT - FALL  Goal: Free from fall injury  Description: INTERVENTIONS:  - Assess pt frequently for physical needs  - Identify cognitive and physical deficits and behaviors that affect risk of falls.  - [place] fall precautions as indicated by assessment.  - Educate pt/family on patient safety including physical limitations  - Instruct pt to call for assistance with activity based on assessment  - Modify environment to reduce risk of injury  - Provide assistive devices as appropriate  - Consider OT/PT consult to assist with strengthening/mobility  - Encourage toileting schedule  Outcome: Progressing     Problem: SKIN/TISSUE INTEGRITY - ADULT  Goal: Skin integrity remains intact  Description: INTERVENTIONS  - Assess and document risk factors for pressure ulcer development  - Assess and document skin integrity  - Monitor for areas of redness and/or skin breakdown  - Initiate interventions, skin care algorithm/standards of care as needed  Outcome: Progressing  Goal: Incision(s), wounds(s) or drain site(s) healing without S/S of infection  Description: INTERVENTIONS:  - Assess and document risk factors for pressure ulcer development  - Assess and document skin integrity  - Assess and document dressing/incision, wound bed, drain sites and surrounding tissue  - Implement wound care per orders  - Initiate isolation precautions as appropriate  - Initiate Pressure Ulcer prevention bundle as indicated  Outcome: Progressing  Goal: Oral mucous membranes remain intact  Description: INTERVENTIONS  - Assess oral mucosa and hygiene practices  - Implement preventative oral hygiene regimen  - Implement oral medicated treatments as ordered  Outcome: Progressing     Problem: Delirium  Goal: Minimize duration of delirium  Description: Interventions:  - Encourage use of hearing aids, eye glasses  - Promote highest level of mobility daily  - Provide frequent reorientation  - Promote  wakefulness i.e. lights on, blinds open  - Promote sleep, encourage patient's normal rest cycle i.e. lights off, TV off, minimize noise and interruptions  - Encourage family to assist in orientation and promotion of home routines  Outcome: Progressing     Problem: Diabetes/Glucose Control  Goal: Glucose maintained within prescribed range  Description: INTERVENTIONS:  - Monitor Blood Glucose as ordered  - Assess for signs and symptoms of hyperglycemia and hypoglycemia  - Administer ordered medications to maintain glucose within target range  - Assess barriers to adequate nutritional intake and initiate nutrition consult as needed  - Instruct patient on self management of diabetes  Outcome: Progressing     No acute changes today. Pt is a&o, on RA. Tolerating diet, states she's scared to eat d/t having multiple loose bm's-imodium added per MD. Monitoring blood sugars, IVF infusing. Tramadol and norco to manage sacral wound pain and in BLE. Dressing changed per wound care. Merrem as abx coverage. Max assist, repositioning as tolerated. All comfort and safety measures put in place, frequent rounding.

## 2024-08-30 NOTE — PROGRESS NOTES
[facility]     Margaret Silverio Patient Status:  Inpatient    3/14/1945 MRN [id number]   Location [facility] POST ANESTHESIA CARE UNIT Attending Fernando Galan MD   Hosp Day # 18 PCP Johnathon Rodriguez,      Subjective:     Pt was seen and examined.   Resting comfortably in bed, alert and awake  No acute distress, no pain      Objective:    Review of Systems:   ROS completed; pertinent positive and negatives stated in subjective.      Vital signs:  Temp:  [97.5 °F (36.4 °C)-97.6 °F (36.4 °C)] 97.5 °F (36.4 °C)  Pulse:  [59-60] 59  Resp:  [16-18] 18  BP: (135-144)/(71-74) 136/71  SpO2:  [92 %-97 %] 95 %      Physical Exam:    Gen: alert, oriented x2-3  Chest: good air entry CTABL  CVS: normal s1 and s2 RR  Abd: NABS soft NT ND  Neuro: non-focal, following commands  Skin: decub dressing CDI  Ext: no edema in bilateral LE      Diagnostic Data:    Labs:  Recent Labs   Lab 24  0423 24  0435 24  0407 24  0611 24  0842   WBC 25.5* 40.6* 48.1* 31.1* 19.4*   HGB 8.2* 9.1* 7.9* 8.5* 7.6*   MCV 97.5 96.1 95.1 98.3 94.9   .0* 436.0 438.0 461.0* 277.0   BAND 2 5 3  --  8       Recent Labs   Lab 24  0423 24  0730 24  0324 24  0842   GLU 87 135* 112* 92   BUN 50* 42* 52* 30*   CREATSERUM 2.01* 1.90* 1.79* 0.99   CA 9.1 9.0 8.6* 8.6*   ALB 3.2 3.2 3.1*  --    * 135* 139 146*   K 4.7 4.9 4.3 3.3*    107 112 115*   CO2 21.0 18.0* 19.0* 22.0   ALKPHO 160* 168* 161*  --    AST 14 12 13  --    ALT <7* <7* <7*  --    BILT 0.5 0.4 0.3  --    TP 6.5 6.6 6.4  --        Estimated Creatinine Clearance: 40.2 mL/min (based on SCr of 0.99 mg/dL).    No results for input(s): \"PTP\", \"INR\" in the last 168 hours.           Imaging: Imaging data reviewed in Epic.    Medications:    potassium chloride  40 mEq Oral Once    vancomycin  125 mg Oral q12h    meropenem  500 mg Intravenous q12h     levETIRAcetam  500 mg Intravenous Daily    heparin  5,000 Units Subcutaneous Q8H BROOKLYNN    acetaminophen  650 mg Oral Q8H    patiromer  8.4 g Oral Once    sacubitril-valsartan  1 tablet Oral BID    bumetanide  1 mg Oral Daily    metoprolol succinate  25 mg Oral Daily Beta Blocker    [Held by provider] midodrine  5 mg Oral TID    sodium hypochlorite   Topical BID    amiodarone  200 mg Oral Daily    ferrous sulfate  325 mg Oral Daily with breakfast    folic acid  800 mcg Oral Daily    gabapentin  300 mg Oral TID    [Held by provider] isosorbide dinitrate  20 mg Oral TID (Nitrates)    pantoprazole  40 mg Oral BID AC       Assessment & Plan:     GPC bacteremia  Sacral decubitus ulcer  Gsx on consult  Now s/p Excision of cyst, sharp excisional debridement of buttock decubitus ulcer  PRN pain control  Completed 10 days of abx  WBC 8.5 ->11.9 -> 15 -> 25.5 -> 40 -> 38 -> 31 -> 19.4  No fever curve, no obv source noted  ID on consult  Repeat Bcx NGTD  Started on Vanc and Meropenem  ICU on consult   Hold off stress dose steroids at this time  Close neuro monitoring  Currently on PO Vanc, IV Meropenem  AMS -> intermittent -> much improved  Unclear etiology, consider metabolic encephalopathy  Neurology following, EEG WNL  Keppra resumed  CT head non-acute  ABG reviewed  ICU on consult   Hold off stress dose steroids at this time  Close neuromonitoring  Fall precautions  Hyponatremia  Na 129 -> 133 -> 135 - 139 -> 146  Continue D5NS  Monitor labs  UTI  UA reviewed  Ucx pending -> e. coli  NICM, HFrEF  Hx of paroxysmal afib  Hx of BRANDYN occlusion  AV paced  Continue home meds  GEORGE on CKD 3 - stable  Monitor renal function  Avoid nephrotoxic agents  Anemia  Hgb stable  Cont to monitor   Thrombocytosis  Monitor labs  Heparin s/c for DVT ppx  Nutrition  Dietitian on consult  Family reports the patient would not want a feeding tube  IVF discontinued, encourage to eat more  GOC  Palliative care following. Patient and family okay with  CPC  Family to select SNF  Family declined hospice     Plan of care discussed with RN at bedside     Supplementary Documentation:     Quality:  DVT Prophylaxis: Heparin s/c  CODE status: DNAR/Select      Estimated date of discharge: TBD  Discharge is dependent on: clinical stability  At this point Ms. Silverio is expected to be discharge to: TBD    MDM: High

## 2024-08-30 NOTE — PROGRESS NOTES
[facility] ID PROGRESS NOTE    Margaret Silverio Patient Status:  Inpatient    3/14/1945 MRN [id number]   Location [facility] 2W/SW Attending Veto Zhang MD   Hosp Day # 18 PCP Johnathon Rodriguez, DO     SUBJECTIVE  ROS done. Still having pain but controlled. Had multiple formed Bms yesterday but now improved.    ASSESSMENT:     Antibiotics: Meropenem (IV ceftriaxone -, vancomycin)     # Fever-better  # Staph epidermidis bacteremia in 1/2 sets on 24 - likely contaminant               -Has bioprosthetic MVR and L chest PPM   -TTE without vegetation  # Leukocytosis - suspect reactive; r/o bacteremia; repeat cx NGTD  -Also got 1 dose of solumedrol  # GEORGE on CKD  # Anemia s/p transfusion      # Sacral wound stage 2 s/p OR debridement 24 - cx E.coli, anaerobes - currently no signs of infection     # Asymptomatic bacteruria - E. coli  # Intermittent AMS    # RA, bedbound, previously on MTX and prednisone               - received prednisone at last discharge; currently off related to limited PO     PLAN:  -  Continue on meropenem and OVP, day 6 of 10 (EOT 9/3) with continued OVP until 9/10.  -  Follow fever curve, wbc.   -  Reviewed labs, micro, imaging reports.  -  Case d/w patient, RN.     History of Present Illness:  79-year-old female with a history of severe nonischemic cardiomyopathy with multiple admissions for heart failure, A-fib, CKD, severe RA with multiple joints involved was recently discharged about 1 month prior.  During that admission had a large left pleural effusion on dobutamine, Bumex, thoracentesis, left and right heart cath showing normal coronaries but severe volume overload.  Also had GEORGE and leukopenia.  Now admitted on  with painful decubitus ulcer.  Patient is bedbound with severe RA on methotrexate and prednisone.  Afebrile, 2 L nasal cannula.  Seen by general surgery and taken the OR on  status post excision of cyst  and sharp debridement of the decubitus ulcer.  Cultures with E. coli and anaerobes.  IV ceftriaxone for sacral wound and UTI completed 8/21.  Now with fever of 100.2 on 8/22 with increased WBC.  Blood culture sent with staph not aureus in 1 out of 2 sets.  Chest x-ray with no new focal opacity with persistent edema noted.  Goals of care discussion ongoing related to poor appetite, pain control.     OBJECTIVE  /73 (BP Location: Left arm)   Pulse 57   Temp 98.2 °F (36.8 °C) (Oral)   Resp 18   Wt 120 lb (54.4 kg)   SpO2 95%   BMI 20.60 kg/m²     Gen:   Awake, in bed  HEENT:  EOMI, neck supple  CV/lungs:  RRR, lungs CTAB  Abdom:  Soft, no TTP  Skin/extrem:  No rashes, no c/c/e, hand contractures noted  :   Purewick+  Lines:  PIV+    Laboratory Data: Reviewed     Microbiology: Reviewed     Radiology: Reviewed    [facility]  [phone]

## 2024-08-31 LAB
GLUCOSE BLDC GLUCOMTR-MCNC: 104 MG/DL (ref 70–99)
GLUCOSE BLDC GLUCOMTR-MCNC: 111 MG/DL (ref 70–99)
GLUCOSE BLDC GLUCOMTR-MCNC: 86 MG/DL (ref 70–99)
POTASSIUM SERPL-SCNC: 4.3 MMOL/L (ref 3.5–5.1)

## 2024-08-31 PROCEDURE — 99233 SBSQ HOSP IP/OBS HIGH 50: CPT | Performed by: HOSPITALIST

## 2024-08-31 NOTE — PROGRESS NOTES
[facility]     Margaret Silverio Patient Status:  Inpatient    3/14/1945 MRN [id number]   Location [facility] POST ANESTHESIA CARE UNIT Attending Fernando Galan MD   Hosp Day # 19 PCP Johnathon Rodriguez,      Subjective:     Pt was seen and examined.   Resting comfortably in bed, somnolent but arousable   No acute distress, no pain      Objective:    Review of Systems:   ROS completed; pertinent positive and negatives stated in subjective.      Vital signs:  Temp:  [97.4 °F (36.3 °C)-97.8 °F (36.6 °C)] 97.8 °F (36.6 °C)  Pulse:  [63] 63  Resp:  [18-19] 19  BP: (132-155)/(61-91) 144/78  SpO2:  [100 %] 100 %      Physical Exam:    Gen: alert, oriented x2-3  Chest: good air entry CTABL  CVS: normal s1 and s2 RR  Abd: NABS soft NT ND  Neuro: non-focal, following commands  Skin: decub dressing CDI  Ext: no edema in bilateral LE      Diagnostic Data:    Labs:  Recent Labs   Lab 24  0435 24  0407 24  0611 24  0842   WBC 40.6* 48.1* 31.1* 19.4*   HGB 9.1* 7.9* 8.5* 7.6*   MCV 96.1 95.1 98.3 94.9   .0 438.0 461.0* 277.0   BAND 5 3  --  8       Recent Labs   Lab 24  0730 24  0324 24  0842 24  0739   * 112* 92  --    BUN 42* 52* 30*  --    CREATSERUM 1.90* 1.79* 0.99  --    CA 9.0 8.6* 8.6*  --    ALB 3.2 3.1*  --   --    * 139 146*  --    K 4.9 4.3 3.3* 4.3    112 115*  --    CO2 18.0* 19.0* 22.0  --    ALKPHO 168* 161*  --   --    AST 12 13  --   --    ALT <7* <7*  --   --    BILT 0.4 0.3  --   --    TP 6.6 6.4  --   --        Estimated Creatinine Clearance: 40.4 mL/min (based on SCr of 0.99 mg/dL).    No results for input(s): \"PTP\", \"INR\" in the last 168 hours.           Imaging: Imaging data reviewed in Epic.    Medications:    vancomycin  125 mg Oral q12h    meropenem  500 mg Intravenous q12h    levETIRAcetam  500 mg Intravenous Daily    heparin  5,000 Units  Subcutaneous Q8H BROOKLYNN    acetaminophen  650 mg Oral Q8H    patiromer  8.4 g Oral Once    sacubitril-valsartan  1 tablet Oral BID    bumetanide  1 mg Oral Daily    metoprolol succinate  25 mg Oral Daily Beta Blocker    [Held by provider] midodrine  5 mg Oral TID    sodium hypochlorite   Topical BID    amiodarone  200 mg Oral Daily    ferrous sulfate  325 mg Oral Daily with breakfast    folic acid  800 mcg Oral Daily    gabapentin  300 mg Oral TID    [Held by provider] isosorbide dinitrate  20 mg Oral TID (Nitrates)    pantoprazole  40 mg Oral BID AC       Assessment & Plan:     GPC bacteremia  Sacral decubitus ulcer  Gsx on consult  Now s/p Excision of cyst, sharp excisional debridement of buttock decubitus ulcer  PRN pain control  Completed 10 days of abx  WBC 8.5 ->11.9 -> 15 -> 25.5 -> 40 -> 38 -> 31 -> 19.4  No fever curve, no obv source noted  ID on consult  Repeat Bcx NGTD  Started on Vanc and Meropenem  ICU on consult   Hold off stress dose steroids at this time  Close neuro monitoring  Currently on PO Vanc, IV Meropenem  AMS -> intermittent -> much improved  Unclear etiology, consider metabolic encephalopathy  Neurology following, EEG WNL  Keppra resumed  CT head non-acute  ABG reviewed  ICU on consult   Hold off stress dose steroids at this time  Close neuromonitoring  Fall precautions  Hyponatremia  Na 129 -> 133 -> 135 - 139 -> 146  Continue D5NS  Monitor labs  UTI  UA reviewed  Ucx pending -> e. coli  NICM, HFrEF  Hx of paroxysmal afib  Hx of BRANDYN occlusion  AV paced  Continue home meds  GEORGE on CKD 3 - stable  Monitor renal function  Avoid nephrotoxic agents  Anemia  Hgb stable  Cont to monitor   Thrombocytosis  Monitor labs  Heparin s/c for DVT ppx  Nutrition  Dietitian on consult  Family reports the patient would not want a feeding tube  IVF discontinued, encourage to eat more  GOC  Palliative care following. Patient and family okay with [facility]  Awaiting insurance auth for [facility]  Family declined  hospice     Plan of care discussed with RN at bedside     Supplementary Documentation:     Quality:  DVT Prophylaxis: Heparin s/c  CODE status: DNAR/Select      Estimated date of discharge: TBD  Discharge is dependent on: clinical stability  At this point Ms. Silverio is expected to be discharge to: TBD    MDM: High

## 2024-08-31 NOTE — PLAN OF CARE
Plan of care reviewed with Margaret and family at bedside. Discharge plan pending- waiting for auth. PRN norco given for c/o sacral pain. No BM today. Voiding with pure wick in place. Merrem continued. Max assistance. Safety measures in place and call light within reach.

## 2024-08-31 NOTE — PLAN OF CARE
Problem: Patient Centered Care  Goal: Patient preferences are identified and integrated in the patient's plan of care  Description: Interventions:  - What would you like us to know as we care for you?   - Provide timely, complete, and accurate information to patient/family  - Incorporate patient and family knowledge, values, beliefs, and cultural backgrounds into the planning and delivery of care  - Encourage patient/family to participate in care and decision-making at the level they choose  - Honor patient and family perspectives and choices  Outcome: Progressing     Problem: Patient/Family Goals  Goal: Patient/Family Short Term Goal  Description: Patient's Short Term Goal:     Interventions:   -   - See additional Care Plan goals for specific interventions  Outcome: Progressing     Problem: PAIN - ADULT  Goal: Verbalizes/displays adequate comfort level or patient's stated pain goal  Description: INTERVENTIONS:  - Encourage pt to monitor pain and request assistance  - Assess pain using appropriate pain scale  - Administer analgesics based on type and severity of pain and evaluate response  - Implement non-pharmacological measures as appropriate and evaluate response  - Consider cultural and social influences on pain and pain management  - Manage/alleviate anxiety  - Utilize distraction and/or relaxation techniques  - Monitor for opioid side effects  - Notify MD/LIP if interventions unsuccessful or patient reports new pain  - Anticipate increased pain with activity and pre-medicate as appropriate  Outcome: Progressing     Problem: SAFETY ADULT - FALL  Goal: Free from fall injury  Description: INTERVENTIONS:  - Assess pt frequently for physical needs  - Identify cognitive and physical deficits and behaviors that affect risk of falls.  - [place] fall precautions as indicated by assessment.  - Educate pt/family on patient safety including physical limitations  - Instruct pt to call for assistance with activity  based on assessment  - Modify environment to reduce risk of injury  - Provide assistive devices as appropriate  - Consider OT/PT consult to assist with strengthening/mobility  - Encourage toileting schedule  Outcome: Progressing     Pt resting in bed. Repositioned throughout shift. Purewick in place. norco prn for pain. Dressing to sacrum changed. Aquacels maintained to R shoulder, desirae heals, desirae elbows. Edema to left arm-elevated, improving. Refused heal boots. IV antibiotic given. Remote tele in place. Accuchecks q 6hrs. Safety measures in place. Family at bedside in evening. Frequent rounding being done.

## 2024-08-31 NOTE — PLAN OF CARE
Problem: Patient Centered Care  Goal: Patient preferences are identified and integrated in the patient's plan of care  Description: Interventions:  - What would you like us to know as we care for you?   - Provide timely, complete, and accurate information to patient/family  - Incorporate patient and family knowledge, values, beliefs, and cultural backgrounds into the planning and delivery of care  - Encourage patient/family to participate in care and decision-making at the level they choose  - Honor patient and family perspectives and choices  Outcome: Progressing     Problem: Patient/Family Goals  Goal: Patient/Family Long Term Goal  Description: Patient's Long Term Goal:     Interventions:  - See additional Care Plan goals for specific interventions  Outcome: Progressing  Goal: Patient/Family Short Term Goal  Description: Patient's Short Term Goal:     Interventions:   - See additional Care Plan goals for specific interventions  Outcome: Progressing     Problem: PAIN - ADULT  Goal: Verbalizes/displays adequate comfort level or patient's stated pain goal  Description: INTERVENTIONS:  - Encourage pt to monitor pain and request assistance  - Assess pain using appropriate pain scale  - Administer analgesics based on type and severity of pain and evaluate response  - Implement non-pharmacological measures as appropriate and evaluate response  - Consider cultural and social influences on pain and pain management  - Manage/alleviate anxiety  - Utilize distraction and/or relaxation techniques  - Monitor for opioid side effects  - Notify MD/LIP if interventions unsuccessful or patient reports new pain  - Anticipate increased pain with activity and pre-medicate as appropriate  Outcome: Progressing     Problem: RISK FOR INFECTION - ADULT  Goal: Absence of fever/infection during anticipated neutropenic period  Description: INTERVENTIONS  - Monitor WBC  - Administer growth factors as ordered  - Implement neutropenic  guidelines  Outcome: Progressing     Problem: SAFETY ADULT - FALL  Goal: Free from fall injury  Description: INTERVENTIONS:  - Assess pt frequently for physical needs  - Identify cognitive and physical deficits and behaviors that affect risk of falls.  - [place] fall precautions as indicated by assessment.  - Educate pt/family on patient safety including physical limitations  - Instruct pt to call for assistance with activity based on assessment  - Modify environment to reduce risk of injury  - Provide assistive devices as appropriate  - Consider OT/PT consult to assist with strengthening/mobility  - Encourage toileting schedule  Outcome: Progressing     Problem: SKIN/TISSUE INTEGRITY - ADULT  Goal: Skin integrity remains intact  Description: INTERVENTIONS  - Assess and document risk factors for pressure ulcer development  - Assess and document skin integrity  - Monitor for areas of redness and/or skin breakdown  - Initiate interventions, skin care algorithm/standards of care as needed  Outcome: Progressing  Goal: Incision(s), wounds(s) or drain site(s) healing without S/S of infection  Description: INTERVENTIONS:  - Assess and document risk factors for pressure ulcer development  - Assess and document skin integrity  - Assess and document dressing/incision, wound bed, drain sites and surrounding tissue  - Implement wound care per orders  - Initiate isolation precautions as appropriate  - Initiate Pressure Ulcer prevention bundle as indicated  Outcome: Progressing  Goal: Oral mucous membranes remain intact  Description: INTERVENTIONS  - Assess oral mucosa and hygiene practices  - Implement preventative oral hygiene regimen  - Implement oral medicated treatments as ordered  Outcome: Progressing     Problem: Delirium  Goal: Minimize duration of delirium  Description: Interventions:  - Encourage use of hearing aids, eye glasses  - Promote highest level of mobility daily  - Provide frequent reorientation  - Promote  wakefulness i.e. lights on, blinds open  - Promote sleep, encourage patient's normal rest cycle i.e. lights off, TV off, minimize noise and interruptions  - Encourage family to assist in orientation and promotion of home routines  Outcome: Progressing     Problem: Diabetes/Glucose Control  Goal: Glucose maintained within prescribed range  Description: INTERVENTIONS:  - Monitor Blood Glucose as ordered  - Assess for signs and symptoms of hyperglycemia and hypoglycemia  - Administer ordered medications to maintain glucose within target range  - Assess barriers to adequate nutritional intake and initiate nutrition consult as needed  - Instruct patient on self management of diabetes  Outcome: Progressing     No acute changes today. A&o, more drowsy today. On tele, no calls. Monitoring blood sugars. Minimal appetite today. IVF infusing. Merrem as abx coverage. Norco given for pain control. Dressings changed per order. Max assist. Repositioning as tolerated. Plans to discharge to rehab, pending insurance auth. All comfort and safety measures put in place, frequent rounding.

## 2024-09-01 LAB
GLUCOSE BLDC GLUCOMTR-MCNC: 101 MG/DL (ref 70–99)
GLUCOSE BLDC GLUCOMTR-MCNC: 111 MG/DL (ref 70–99)
GLUCOSE BLDC GLUCOMTR-MCNC: 116 MG/DL (ref 70–99)
GLUCOSE BLDC GLUCOMTR-MCNC: 99 MG/DL (ref 70–99)

## 2024-09-01 PROCEDURE — 99233 SBSQ HOSP IP/OBS HIGH 50: CPT | Performed by: HOSPITALIST

## 2024-09-01 NOTE — PLAN OF CARE
Problem: Patient Centered Care  Goal: Patient preferences are identified and integrated in the patient's plan of care  Description: Interventions:  - What would you like us to know as we care for you?   - Provide timely, complete, and accurate information to patient/family  - Incorporate patient and family knowledge, values, beliefs, and cultural backgrounds into the planning and delivery of care  - Encourage patient/family to participate in care and decision-making at the level they choose  - Honor patient and family perspectives and choices  Outcome: Progressing     Problem: Patient/Family Goals  Goal: Patient/Family Long Term Goal  Description: Patient's Long Term Goal:     Interventions:  -   - See additional Care Plan goals for specific interventions  Outcome: Progressing  Goal: Patient/Family Short Term Goal  Description: Patient's Short Term Goal:     Interventions:   -   - See additional Care Plan goals for specific interventions  Outcome: Progressing     Problem: PAIN - ADULT  Goal: Verbalizes/displays adequate comfort level or patient's stated pain goal  Description: INTERVENTIONS:  - Encourage pt to monitor pain and request assistance  - Assess pain using appropriate pain scale  - Administer analgesics based on type and severity of pain and evaluate response  - Implement non-pharmacological measures as appropriate and evaluate response  - Consider cultural and social influences on pain and pain management  - Manage/alleviate anxiety  - Utilize distraction and/or relaxation techniques  - Monitor for opioid side effects  - Notify MD/LIP if interventions unsuccessful or patient reports new pain  - Anticipate increased pain with activity and pre-medicate as appropriate  Outcome: Progressing     Problem: SAFETY ADULT - FALL  Goal: Free from fall injury  Description: INTERVENTIONS:  - Assess pt frequently for physical needs  - Identify cognitive and physical deficits and behaviors that affect risk of  falls.  - [place] fall precautions as indicated by assessment.  - Educate pt/family on patient safety including physical limitations  - Instruct pt to call for assistance with activity based on assessment  - Modify environment to reduce risk of injury  - Provide assistive devices as appropriate  - Consider OT/PT consult to assist with strengthening/mobility  - Encourage toileting schedule  Outcome: Progressing     Problem: Diabetes/Glucose Control  Goal: Glucose maintained within prescribed range  Description: INTERVENTIONS:  - Monitor Blood Glucose as ordered  - Assess for signs and symptoms of hyperglycemia and hypoglycemia  - Administer ordered medications to maintain glucose within target range  - Assess barriers to adequate nutritional intake and initiate nutrition consult as needed  - Instruct patient on self management of diabetes  Outcome: Progressing     Pt resting in bed. Repositioned with assist. Norco prn for pain. Dressing to sacrum changed in AM. IVF continued. IV antibiotic given. Remote tele in place. Poor appetite. Accuchecks ACHS. Purewick in use. Drowsy at start of shift but then awake and alert for most of night watching TV. Declined heel boots and SCDs. Aqucels in place for protection. Safety measures in place. Frequent rounding being done.

## 2024-09-01 NOTE — PROGRESS NOTES
[facility]  [facility]     Margaret Silverio Patient Status:  Inpatient    3/14/1945 MRN [id number]   Location [facility] POST ANESTHESIA CARE UNIT Attending Fernando Galan MD   Hosp Day # 20 PCP Johnathon Rodriguez,      Subjective:     Pt was seen and examined.   Resting comfortably in bed, awake today  No acute distress, no pain      Objective:    Review of Systems:   ROS completed; pertinent positive and negatives stated in subjective.      Vital signs:  Temp:  [97.7 °F (36.5 °C)-98 °F (36.7 °C)] 97.7 °F (36.5 °C)  Pulse:  [60] 60  Resp:  [16-18] 18  BP: (130-140)/(67-79) 140/67  SpO2:  [91 %-95 %] 95 %      Physical Exam:    Gen: alert, oriented x2-3  Chest: good air entry CTABL  CVS: normal s1 and s2 RR  Abd: NABS soft NT ND  Neuro: non-focal, following commands  Skin: decub dressing CDI  Ext: no edema in bilateral LE      Diagnostic Data:    Labs:  Recent Labs   Lab 24  0407 24  0611 24  0842   WBC 48.1* 31.1* 19.4*   HGB 7.9* 8.5* 7.6*   MCV 95.1 98.3 94.9   .0 461.0* 277.0   BAND 3  --  8       Recent Labs   Lab 24  0324 24  0842 24  0739   * 92  --    BUN 52* 30*  --    CREATSERUM 1.79* 0.99  --    CA 8.6* 8.6*  --    ALB 3.1*  --   --     146*  --    K 4.3 3.3* 4.3    115*  --    CO2 19.0* 22.0  --    ALKPHO 161*  --   --    AST 13  --   --    ALT <7*  --   --    BILT 0.3  --   --    TP 6.4  --   --        Estimated Creatinine Clearance: 40.4 mL/min (based on SCr of 0.99 mg/dL).    No results for input(s): \"PTP\", \"INR\" in the last 168 hours.           Imaging: Imaging data reviewed in Epic.    Medications:    vancomycin  125 mg Oral q12h    meropenem  500 mg Intravenous q12h    levETIRAcetam  500 mg Intravenous Daily    heparin  5,000 Units Subcutaneous Q8H BROOKLYNN    acetaminophen  650 mg Oral Q8H    patiromer  8.4 g Oral Once    sacubitril-valsartan  1 tablet Oral BID     bumetanide  1 mg Oral Daily    metoprolol succinate  25 mg Oral Daily Beta Blocker    [Held by provider] midodrine  5 mg Oral TID    sodium hypochlorite   Topical BID    amiodarone  200 mg Oral Daily    ferrous sulfate  325 mg Oral Daily with breakfast    folic acid  800 mcg Oral Daily    gabapentin  300 mg Oral TID    [Held by provider] isosorbide dinitrate  20 mg Oral TID (Nitrates)    pantoprazole  40 mg Oral BID AC       Assessment & Plan:     GPC bacteremia  Sacral decubitus ulcer  Gsx on consult  Now s/p Excision of cyst, sharp excisional debridement of buttock decubitus ulcer  PRN pain control  Completed 10 days of abx  WBC 8.5 ->11.9 -> 15 -> 25.5 -> 40 -> 38 -> 31 -> 19.4  No fever curve, no obv source noted  ID on consult  Repeat Bcx NGTD  Started on Vanc and Meropenem  ICU on consult   Hold off stress dose steroids at this time  Close neuro monitoring  Currently on PO Vanc, IV Meropenem  AMS -> intermittent -> much improved  Unclear etiology, consider metabolic encephalopathy  Neurology following, EEG WNL  Keppra resumed  CT head non-acute  ABG reviewed  ICU on consult   Hold off stress dose steroids at this time  Close neuromonitoring  Fall precautions  Hyponatremia  Na 129 -> 133 -> 135 - 139 -> 146  Continue D5NS  Monitor labs  UTI  UA reviewed  Ucx pending -> e. coli  NICM, HFrEF  Hx of paroxysmal afib  Hx of BRANDYN occlusion  AV paced  Continue home meds  GEORGE on CKD 3 - stable  Monitor renal function  Avoid nephrotoxic agents  Anemia  Hgb stable  Cont to monitor   Thrombocytosis  Monitor labs  Heparin s/c for DVT ppx  Nutrition  Dietitian on consult  Family reports the patient would not want a feeding tube  IVF discontinued, encourage to eat more  GOC  Palliative care following. Patient and family okay with [facility]  Awaiting insurance auth for [facility]  Family declined hospice     Plan of care discussed with RN at bedside     Supplementary Documentation:     Quality:  DVT Prophylaxis: Heparin  s/c  CODE status: DNAR/Select      Estimated date of discharge: TBD  Discharge is dependent on: clinical stability  At this point Ms. Silverio is expected to be discharge to: TBD    MDM: High

## 2024-09-01 NOTE — PLAN OF CARE
Problem: Patient Centered Care  Goal: Patient preferences are identified and integrated in the patient's plan of care  Description: Interventions:  - What would you like us to know as we care for you?   - Provide timely, complete, and accurate information to patient/family  - Incorporate patient and family knowledge, values, beliefs, and cultural backgrounds into the planning and delivery of care  - Encourage patient/family to participate in care and decision-making at the level they choose  - Honor patient and family perspectives and choices  Outcome: Progressing     Problem: Patient/Family Goals  Goal: Patient/Family Long Term Goal  Description: Patient's Long Term Goal:     Interventions:    - See additional Care Plan goals for specific interventions  Outcome: Progressing  Goal: Patient/Family Short Term Goal  Description: Patient's Short Term Goal:     Interventions:   - See additional Care Plan goals for specific interventions  Outcome: Progressing     Problem: PAIN - ADULT  Goal: Verbalizes/displays adequate comfort level or patient's stated pain goal  Description: INTERVENTIONS:  - Encourage pt to monitor pain and request assistance  - Assess pain using appropriate pain scale  - Administer analgesics based on type and severity of pain and evaluate response  - Implement non-pharmacological measures as appropriate and evaluate response  - Consider cultural and social influences on pain and pain management  - Manage/alleviate anxiety  - Utilize distraction and/or relaxation techniques  - Monitor for opioid side effects  - Notify MD/LIP if interventions unsuccessful or patient reports new pain  - Anticipate increased pain with activity and pre-medicate as appropriate  Outcome: Progressing     Problem: RISK FOR INFECTION - ADULT  Goal: Absence of fever/infection during anticipated neutropenic period  Description: INTERVENTIONS  - Monitor WBC  - Administer growth factors as ordered  - Implement neutropenic  guidelines  Outcome: Progressing     Problem: SAFETY ADULT - FALL  Goal: Free from fall injury  Description: INTERVENTIONS:  - Assess pt frequently for physical needs  - Identify cognitive and physical deficits and behaviors that affect risk of falls.  - [place] fall precautions as indicated by assessment.  - Educate pt/family on patient safety including physical limitations  - Instruct pt to call for assistance with activity based on assessment  - Modify environment to reduce risk of injury  - Provide assistive devices as appropriate  - Consider OT/PT consult to assist with strengthening/mobility  - Encourage toileting schedule  Outcome: Progressing     Problem: SKIN/TISSUE INTEGRITY - ADULT  Goal: Skin integrity remains intact  Description: INTERVENTIONS  - Assess and document risk factors for pressure ulcer development  - Assess and document skin integrity  - Monitor for areas of redness and/or skin breakdown  - Initiate interventions, skin care algorithm/standards of care as needed  Outcome: Progressing  Goal: Incision(s), wounds(s) or drain site(s) healing without S/S of infection  Description: INTERVENTIONS:  - Assess and document risk factors for pressure ulcer development  - Assess and document skin integrity  - Assess and document dressing/incision, wound bed, drain sites and surrounding tissue  - Implement wound care per orders  - Initiate isolation precautions as appropriate  - Initiate Pressure Ulcer prevention bundle as indicated  Outcome: Progressing  Goal: Oral mucous membranes remain intact  Description: INTERVENTIONS  - Assess oral mucosa and hygiene practices  - Implement preventative oral hygiene regimen  - Implement oral medicated treatments as ordered  Outcome: Progressing     Problem: Delirium  Goal: Minimize duration of delirium  Description: Interventions:  - Encourage use of hearing aids, eye glasses  - Promote highest level of mobility daily  - Provide frequent reorientation  - Promote  wakefulness i.e. lights on, blinds open  - Promote sleep, encourage patient's normal rest cycle i.e. lights off, TV off, minimize noise and interruptions  - Encourage family to assist in orientation and promotion of home routines  Outcome: Progressing     Problem: Diabetes/Glucose Control  Goal: Glucose maintained within prescribed range  Description: INTERVENTIONS:  - Monitor Blood Glucose as ordered  - Assess for signs and symptoms of hyperglycemia and hypoglycemia  - Administer ordered medications to maintain glucose within target range  - Assess barriers to adequate nutritional intake and initiate nutrition consult as needed  - Instruct patient on self management of diabetes  Outcome: Progressing       No acute changes today. A&o, drowsy at times. On tele, no calls. Monitoring blood sugars. Minimal appetite. IVF infusing. Merrem as abx coverage. Norco and dilaudid given as needed for pain control. Dressings changed per order. Max assist. Repositioning as tolerated. Plans to discharge to rehab, pending insurance auth. All comfort and safety measures put in place, frequent rounding.

## 2024-09-02 LAB
ALBUMIN SERPL-MCNC: 3 G/DL (ref 3.2–4.8)
ANION GAP SERPL CALC-SCNC: 9 MMOL/L (ref 0–18)
ANTIBODY SCREEN: NEGATIVE
BASOPHILS # BLD: 0.39 X10(3) UL (ref 0–0.2)
BASOPHILS NFR BLD: 2 %
BUN BLD-MCNC: 33 MG/DL (ref 9–23)
BUN/CREAT SERPL: 18 (ref 10–20)
CALCIUM BLD-MCNC: 8.8 MG/DL (ref 8.7–10.4)
CHLORIDE SERPL-SCNC: 117 MMOL/L (ref 98–112)
CO2 SERPL-SCNC: 18 MMOL/L (ref 21–32)
CREAT BLD-MCNC: 1.83 MG/DL
DEPRECATED RDW RBC AUTO: 75.7 FL (ref 35.1–46.3)
EGFRCR SERPLBLD CKD-EPI 2021: 28 ML/MIN/1.73M2 (ref 60–?)
EOSINOPHIL # BLD: 0.77 X10(3) UL (ref 0–0.7)
EOSINOPHIL NFR BLD: 4 %
ERYTHROCYTE [DISTWIDTH] IN BLOOD BY AUTOMATED COUNT: 22 % (ref 11–15)
GLUCOSE BLD-MCNC: 97 MG/DL (ref 70–99)
GLUCOSE BLDC GLUCOMTR-MCNC: 103 MG/DL (ref 70–99)
GLUCOSE BLDC GLUCOMTR-MCNC: 106 MG/DL (ref 70–99)
GLUCOSE BLDC GLUCOMTR-MCNC: 120 MG/DL (ref 70–99)
GLUCOSE BLDC GLUCOMTR-MCNC: 96 MG/DL (ref 70–99)
HCT VFR BLD AUTO: 22.9 %
HGB BLD-MCNC: 6.6 G/DL
HGB BLD-MCNC: 8.5 G/DL
LYMPHOCYTES NFR BLD: 1.54 X10(3) UL (ref 1–4)
LYMPHOCYTES NFR BLD: 8 %
MCH RBC QN AUTO: 29.7 PG (ref 26–34)
MCHC RBC AUTO-ENTMCNC: 28.8 G/DL (ref 31–37)
MCV RBC AUTO: 103.2 FL
METAMYELOCYTES # BLD: 1.35 X10(3) UL
METAMYELOCYTES NFR BLD: 7 %
MONOCYTES # BLD: 0.58 X10(3) UL (ref 0.1–1)
MONOCYTES NFR BLD: 3 %
MYELOCYTES # BLD: 0.77 X10(3) UL
MYELOCYTES NFR BLD: 4 %
NEUTROPHILS # BLD AUTO: 10.6 X10 (3) UL (ref 1.5–7.7)
NEUTROPHILS NFR BLD: 69 %
NEUTS BAND NFR BLD: 3 %
NEUTS HYPERSEG # BLD: 13.9 X10(3) UL (ref 1.5–7.7)
NRBC BLD MANUAL-RTO: 3 %
OSMOLALITY SERPL CALC.SUM OF ELEC: 305 MOSM/KG (ref 275–295)
PHOSPHATE SERPL-MCNC: 4.7 MG/DL (ref 2.4–5.1)
PLATELET # BLD AUTO: 268 10(3)UL (ref 150–450)
PLATELET MORPHOLOGY: NORMAL
POTASSIUM SERPL-SCNC: 5 MMOL/L (ref 3.5–5.1)
RBC # BLD AUTO: 2.22 X10(6)UL
RH BLOOD TYPE: POSITIVE
SODIUM SERPL-SCNC: 144 MMOL/L (ref 136–145)
TOTAL CELLS COUNTED BLD: 100
WBC # BLD AUTO: 19.3 X10(3) UL (ref 4–11)

## 2024-09-02 PROCEDURE — 99233 SBSQ HOSP IP/OBS HIGH 50: CPT | Performed by: HOSPITALIST

## 2024-09-02 RX ORDER — SODIUM CHLORIDE 9 MG/ML
INJECTION, SOLUTION INTRAVENOUS ONCE
Status: COMPLETED | OUTPATIENT
Start: 2024-09-02 | End: 2024-09-02

## 2024-09-02 NOTE — PROGRESS NOTES
[facility]  part of [facility]  Hospitalist Progress Note     Margaret Jonny Silverio Patient Status:  Inpatient    3/14/1945  79 year old CSN [id number]   Location 441/441-A Attending Otoniel Vasquez MD   Hosp Day # 21 PCP Johnathon Rodriguez DO     Assessment & Plan:   ----------------------------------  Infected sacral decubitus ulcer.  Present on admission.  Status postdebridement.  Positive blood culture with Staph epidermidis, probably contaminant.  -Meropenem, stop 9/3  -ID and general surgery consults appreciated  -Wound care  -Frequent repositioning    Acute metabolic encephalopathy.  Waxing and waning.  Doing pretty well today.  -Reorientation  -Neurology consult appreciated  -Hold Keppra which may be contributing    GEORGE.  Due to infection, volume status.  Initial improvement, now stabilizing with creatinine around 1.8.  Her baseline is around 1.2.  -BMP in the morning  -Monitor urine output    Anemia.  Multifactorial.  Due to prolonged acute illness, CKD, frequent phlebotomy.  -Transfuse 1 unit  -Recheck hemoglobin in the morning    Remote history of seizures.  Has been off Keppra for at least 1 year.  Restarted during this admission initially for concerns of subclinical seizures.  EEG was negative.  Patient now has intermittent somnolence and confusion which may be related to prolonged hospitalization but there may be some contribution from Keppra.  -Hold Keppra  -Monitor for seizure    Other problems  Sepsis, present on admission, now resolved  Hyponatremia, resolved  UTI  Nonischemic cardiomyopathy  Chronic systolic congestive heart failure  Paroxysmal atrial fibrillation  CKD stage III  Thrombocytosis    DVT Mechanical Prophylaxis:   SCDs,    DVT Pharmacologic Prophylaxis   Medication    heparin (Porcine) 5000 UNIT/ML injection 5,000 Units              code status: dnr  dispo: to be determined  malnutrition status at bottom of note    I personally reviewed the available  laboratories, imaging including. I discussed/will discuss the case with consultants. I ordered laboratories and/or radiographic studies. I adjusted medications as detailed above.  Medical decision making high, risk is high.    Subjective:   ----------------------------------  Patient denies significant pain.  States that she is eating okay.  Per nurse, patient was somnolent this morning but when I saw her she was wide-awake and appropriate, interactive.      Objective:   Chief Complaint:   Chief Complaint   Patient presents with    Wound Care     ----------------------------------  Temp:  [97.6 °F (36.4 °C)-98.1 °F (36.7 °C)] 97.6 °F (36.4 °C)  Pulse:  [60] 60  Resp:  [16-18] 18  BP: ()/(54-68) 128/63  SpO2:  [88 %-100 %] 100 %  Gen: A+Ox2.  No distress.   HEENT: NCAT, neck supple, no carotid bruit.  CV: RRR, S1S2, and intact distal pulses. No gallop, rub, murmur.  Pulm: Effort and breath sounds normal. No distress, wheezes, rales, rhonchi.  Abd: Soft, NTND, BS normal, no mass, no HSM, no rebound/guarding.   Neuro: Normal reflexes, CN. Sensory/motor exams grossly normal deficit.   MS: No joint effusions.  No peripheral edema.  Skin: Skin is warm and dry. No rashes, erythema, diaphoresis.   Psych: Normal mood and affect. Calm, cooperative    Labs:  Lab Results   Component Value Date    HGB 6.6 (LL) 09/02/2024    WBC 19.3 (H) 09/02/2024    .0 09/02/2024     09/02/2024    K 5.0 09/02/2024    CREATSERUM 1.83 (H) 09/02/2024    VHCO3 18.0 (L) 05/24/2024    INR 1.25 (H) 02/09/2024    AST 13 08/26/2024    ALT <7 (L) 08/26/2024          sodium chloride   Intravenous Once    vancomycin  125 mg Oral q12h    meropenem  500 mg Intravenous q12h    levETIRAcetam  500 mg Intravenous Daily    heparin  5,000 Units Subcutaneous Q8H BROOKLYNN    acetaminophen  650 mg Oral Q8H    patiromer  8.4 g Oral Once    sacubitril-valsartan  1 tablet Oral BID    bumetanide  1 mg Oral Daily    metoprolol succinate  25 mg Oral Daily  Beta Blocker    [Held by provider] midodrine  5 mg Oral TID    sodium hypochlorite   Topical BID    amiodarone  200 mg Oral Daily    ferrous sulfate  325 mg Oral Daily with breakfast    folic acid  800 mcg Oral Daily    gabapentin  300 mg Oral TID    [Held by provider] isosorbide dinitrate  20 mg Oral TID (Nitrates)    pantoprazole  40 mg Oral BID AC       loperamide    HYDROcodone-acetaminophen    HYDROcodone-acetaminophen    HYDROmorphone **OR** [DISCONTINUED] HYDROmorphone **OR** [DISCONTINUED] HYDROmorphone    senna-docusate    polyethylene glycol (PEG 3350)    glucose **OR** glucose **OR** glucose-vitamin C **OR** dextrose **OR** glucose **OR** glucose **OR** glucose-vitamin C    traMADol    ondansetron    metoclopramide    acetaminophen      Dietitian Malnutrition Assessment        Evaluation for Malnutrition: Criteria for severe malnutrition diagnosis- acute illness/injury related to Wt loss greater than 7.5% in 3 months., Energy intake less than 50% for greater than 5 days., Body fat moderate depletion., Muscle mass moderate depletion.                 RD Malnutrition Care Plan: Adjusted diet to least restrictive to maximize intake., Encouraged increased PO intake., Encouraged small frequent meals with emphasis on high calorie/high protein., Intiated ONS (oral nutritional supplements).    Body Fat/Muscle Mass: Moderate depletion body fat., Moderate depletion muscle mass. triceps region. clavicle region.    Physician Assessment    Patient has a diagnosis of severe malnutrition

## 2024-09-02 NOTE — DIETARY NOTE
ADULT NUTRITION REASSESSMENT    Pt is at high nutrition risk.  Pt meets severe malnutrition criteria.      CRITERIA FOR MALNUTRITION DIAGNOSIS:  Criteria for severe malnutrition diagnosis: acute illness/injury related to wt loss greater than 7.5% in 3 months, energy intake less than 50% for greater than 5 days, body fat moderate depletion, and muscle mass moderate depletion.    RECOMMENDATIONS TO MD:  CPM. Pt/family declining hospice, would like discharge to SNF. Declining feeding tube.    See Nutrition Intervention for oral nutritional supplement (ONS) specifics     ADMITTING DIAGNOSIS:  Urinary tract infection without hematuria, site unspecified [N39.0]  Anemia, unspecified type [D64.9]  Pressure injury of skin of sacral region, unspecified injury stage [L89.159]  PERTINENT PAST MEDICAL HISTORY:   Past Medical History:    Acute kidney insufficiency    Anemia    Arrhythmia    Back problem    CHF (congestive heart failure) (HCC)    CKD (chronic kidney disease) stage 3, GFR 30-59 ml/min (HCC)    Deep vein thrombosis (HCC)    on B.T for only a month, possibly [place]    Essential hypertension    High blood pressure    History of blood transfusion    Rheumatoid arthritis (HCC)    Seizure disorder (HCC)    Pt denied at screening call 6/28/24     PATIENT STATUS: Initial 08/14/24: Pt admit for urinary tract infection without hematuria, anemia and pressure injury or skin of sacral region. PMH sig for HTN, CHF, CKD and others noted above. Pt assessed due to screening at risk for decreased appetite, unintentional weight loss and pressure injury (PI) - unstageable and maceration to right buttock and stage 2 & shear to left buttock. Pt known to nutrition services from previous admissions, last seen 6/19/24 and provided with HF diet education.  Chart reviewed, pt admitted with c/o sacral wound x 3 weeks and dark/loose stools. Pt with recent admission (7/15-7/22) for acute on chronic congestive heart failure - treated with diuretics  and dobutamine. General surgery consulted for debridement.  POD#1 s/p sacral debridement. Discussion with RN, poor appetite. Intakes reviewed, 25-40% x 2 meals since admit (poor). Pt visited, resting with eyes closed but answering majority of questions. Pt reports poor appetite/intake but unable to report timeframe. Pt reports not eating. Pt notes used to drink Ensure but stopped due to going right through causing pain/difficulties prior to surgery. Pt reports weight is not good, usual body weight (UBW) 142# but unable to determine last time weighed this. Current weight 120#. EMR weight review, last known weight 112# 2 oz on 8/1/24. Per EMR weight review, pt noted weighing 129# 10 oz on 7/1/24 - 9.6# or 7.4% weight loss x 1 month (significant), 129# 13 oz on 5/16/24 - 9.8# or 7.6% weight loss x 3 months (significant), 149# 14 oz on 2/3/24 - 29.9# or 19.9% weight loss x 6 months (significant) and 141# 14 oz on 8/21/23 - 21.9# or 15.4% weight loss x 1 year (non-significant). Nutrition focused physical exam (NFPE) completed, see below for details. Encouraged small frequent meals with emphasis on high calorie and high protein and ONS to help maximize nutrition. +ONS  per discussion.    8/19 UPDATE: pt slow to respond but did awake when I directed a question to her, but then fell back asleep. Daughter reports that her dad can get the pt to drink some of the ensure enlive. Otherwise pt eating very little. Pt with high K+ today--not diet related due to limited intake, but did adjust diet to liberalize cardiac diet and added low K+.      08/23/24 UPDATE: C discussions on-going. Per MD documentation, family report the patient would not want a feeding tube. Pt was more alert/awake this AM however was given Norco due to increased pain - now is sleepy/drowsy and inappropriate for interview. Refusing lunch. Only taking a few sips of ONS daily. POD #10 s/p excision of cyst, sharp excisional debridement of buttock decubitus  ulcer.   08/27/24 UPDATE:     Noted hospice meeting tomorrow. Po intake remains poor overall with a few fair/good meals over past week. Pt having pain impacting appetite but palliative follows and pain meds adjusted to address. No feeding tube/family. Supplements provided but intake poor.   09/02/24 UPDATE: Family declined hospice. Also declining feeding tube. Awaiting insurance authorization for SNF placement. Pt continues to have intermittent somnolence and confusion. Pt lying in bed with DTR at bedside at time of visit. DTR with meal from home for pt. Per DTR, family bringing in ~1 meal daily with limited acceptance. Pt's spouse usually visits in the evening. Pt typically consumes 1-1.5 Ensure Enlive daily with encouragement of family. Pt ordered 1 meal/day over the past 3 days per kitchen record. Pt typically eats better when family is present.     FOOD/NUTRITION RELATED HISTORY:  Appetite:  decreased/poor appetite PTA   Intake:  Intake remains minimal  Intake Meeting Needs: No, but oral nutrition supplements (ONS) to maximize   Percent Meals Eaten (last 6 days)       Date/Time Percent Meals Eaten (%)    08/28/24 1103 20 %     Percent Meals Eaten (%): ate almost 1 pancake at 08/28/24 1103    08/28/24 1900 50 %     Percent Meals Eaten (%): she ate half of her mac and cheese at 08/28/24 1900    08/29/24 0900 25 %     Percent Meals Eaten (%): patient almost finished 1 pancake. at 08/29/24 0900    08/30/24 1233 30 %    08/31/24 0900 0 %    08/31/24 1452 0 %    09/01/24 2100 0 %        Food Allergies: No Known Food Allergies (NKFA)  Cultural/Ethnic/Congregation Preferences: Not Obtained    GASTROINTESTINAL: +BM last recorded medium, soft, brown stool x1 on 8/31 and abdomen soft, rounded, with active bowel sounds    MEDICATIONS: reviewed; Noted non-cardiac electrolyte replacement protocol ordered; bumex, iron, folic acid, IV Merrem in 200 ml 0.9NS; IVF of D5W 0.9NS at 75 ml/hr provides 1.8 L, 90 g dextrose and 306  kcal  Continuous meds: (IVF)   dextrose 5%-sodium chloride 0.9% 75 mL/hr at 09/02/24 1035   Scheduled meds:    vancomycin  125 mg Oral q12h    meropenem  500 mg Intravenous q12h    [Held by provider] levETIRAcetam  500 mg Intravenous Daily    heparin  5,000 Units Subcutaneous Q8H BROOKLYNN    acetaminophen  650 mg Oral Q8H    patiromer  8.4 g Oral Once    sacubitril-valsartan  1 tablet Oral BID    bumetanide  1 mg Oral Daily    metoprolol succinate  25 mg Oral Daily Beta Blocker    [Held by provider] midodrine  5 mg Oral TID    sodium hypochlorite   Topical BID    amiodarone  200 mg Oral Daily    ferrous sulfate  325 mg Oral Daily with breakfast    folic acid  800 mcg Oral Daily    gabapentin  300 mg Oral TID    [Held by provider] isosorbide dinitrate  20 mg Oral TID (Nitrates)    pantoprazole  40 mg Oral BID AC   PRN meds: for pain and antinausea.     LABS: reviewed; A1c 5.9% on 11/27/22; elevated BUN and Creatinine c/w GEORGE on CKD; POC Glucose (9/1-9/2) 116,111,103,106,96    Recent Labs     08/30/24  0842 08/31/24  0739 09/02/24  0653   GLU 92  --  97   BUN 30*  --  33*   CREATSERUM 0.99  --  1.83*   CA 8.6*  --  8.8   *  --  144   K 3.3* 4.3 5.0   *  --  117*   CO2 22.0  --  18.0*   PHOS  --   --  4.7   OSMOCALC 308*  --  305*     NUTRITION RELATED PHYSICAL FINDINGS:  - Nutrition Focused Physical Exam (NFPE): moderate depletion body fat triceps region and moderate depletion muscle mass clavicle region unable to evaluate lower extremities at this time - largely bed-bound but will get up and move to a chair for part of the day most days  - Fluid Accumulation: Non-pitting edema RUE and right hand; +1 edema LUE, left hand and BL LE see RN documentation for details  - Skin Integrity: Unstageable wound to right buttocks s/p debridement on 8/13, stage 2 to sacrum -  see RN documentation for details    ANTHROPOMETRICS:  HT:  162.6 cm (5' 4\")  WT: 68 kg (150 lb) - wt up 32 lbs from admission - likely inaccurate    ADMISSION WT: 54.4 kg (120 lbs) - utilized for anthropometric assessment.   BMI: Body mass index is 20.58 kg/m².  BMI CLASSIFICATION: <22 considered underweight for advanced age  IBW: 120 lbs        100% IBW  Usual Body Wt: 142 lbs per pt but unable to determine timeframe      85% UBW    WEIGHT HISTORY: Per EMR weight review, pt noted weighing 149# February 2024 and 141# August 2023  Patient Weight(s) for the past 336 hrs:   Weight   09/02/24 0900 68 kg (150 lb)   09/02/24 0602 68.9 kg (152 lb)   09/01/24 0657 66 kg (145 lb 9.6 oz)   08/31/24 0533 60.4 kg (133 lb 3.2 oz)       Wt Readings from Last 10 Encounters:   09/02/24 68 kg (150 lb)   08/01/24 50.8 kg (112 lb 1.6 oz)   07/22/24 52.3 kg (115 lb 3.2 oz)   07/09/24 56.6 kg (124 lb 12.8 oz)   06/28/24 61.7 kg (136 lb)   07/01/24 58.8 kg (129 lb 9.6 oz)   06/29/24 60.8 kg (134 lb)   06/26/24 63.5 kg (140 lb)   06/21/24 63.5 kg (140 lb)   06/13/24 62.1 kg (137 lb)     NUTRITION DIAGNOSIS/PROBLEM:   Severe Malnutrition related to Acute - Physiological causes resulting in anorexia or diminished intake as evidenced by wt loss greater than 7.5% in 3 months, energy intake less than 50% for greater than 5 days, body fat moderate depletion, and muscle mass moderate depletion.    NUTRITION DIAGNOSIS PROGRESS:  No Improvement (continue)-- intake remains poor    NUTRITION INTERVENTION:   NUTRITION PRESCRIPTION:   Estimated Nutrition needs: --dosing wt of 54.4 kg - wt taken on 8/12/2024  Calories: 4427-4376 calories/day (28-32 calories per kg Dosing wt)  Protein: 64-80 g protein/day (1.2-1.5 g protein/kg Dosing wt)    - Diet:       Procedures    Regular/General diet Is Patient on Accuchecks? No   Most liberal diet in view of poor nutrition intake.     - RD Malnutrition Care Plan: Adjusted diet to least restrictive to maximize intake, Encouraged increased PO intake, Encouraged small frequent meals with emphasis on high calorie/high protein, and Initiated ONS (oral nutritional  supplements)  - Meals and snacks: Encouraged small frequent meals and Encouraged increased PO intake  - Medical Food Supplements: RD added Ensure Enlive (350 calories/ 20 g protein each) BID Strawberry and Magic Cup (290 calories/ 9 g protein each) Daily Chocolate with dinner trays. Rational/use of oral supplements discussed.  - Vitamin and mineral supplements: folic acid and iron / MD  - Feeding assistance: meal set up and feed  - Nutrition education: Discussed importance of adequate energy and protein intake   - Coordination of nutrition care: collaboration with other providers - discussed with RN on unit  - Discharge and transfer of nutrition care to new setting or provider: monitor plans - SNF placement     MONITOR AND EVALUATE/NUTRITION GOALS:  - Food and Nutrient Intake:      Monitor: adequacy of PO intake and adequacy of supplement intake  - Anthropometric Measurement:    Monitor weight  - Nutrition Goals:      PO and supplement greater than 75% of needs, labs within acceptable limits, euglycemia, support wound healing, and support normal BM. Supportive nutrition care.     DIETITIAN FOLLOW UP: RD to follow    Ann Estrada MS, RD, LDN, [facility]  [phone]

## 2024-09-02 NOTE — PLAN OF CARE
Margaret is lethargic, but arouseable. HONG orientation status. Patient nods/gestures appropriately to answer some questions. Does not follow commands.  SpO2 88% on RA. Receiving 1L of O2 per nasal cannula. Wound care provided to sacral wound per order. Dilaudid provided as needed to premedicate dressing change. Poor appetite, refusing to eat or drink anything overnight or take any oral medications. Monitoring blood sugar per order. PureWick in place with diminished urine output. Bladder scanned - see flow sheets. No BM overnight. Pt is a max assist. Frequent repositioning provided as tolerated. Oral care provided. Receiving IVF and IV abx.  Remote tele monitoring in place. Plan to discharge to rehab once insurance authorization is obtained. Appropriate safety measures in place.           Problem: Patient Centered Care  Goal: Patient preferences are identified and integrated in the patient's plan of care  Description: Interventions:  - What would you like us to know as we care for you?   - Provide timely, complete, and accurate information to patient/family  - Incorporate patient and family knowledge, values, beliefs, and cultural backgrounds into the planning and delivery of care  - Encourage patient/family to participate in care and decision-making at the level they choose  - Honor patient and family perspectives and choices  Outcome: Progressing     Problem: Patient/Family Goals  Goal: Patient/Family Long Term Goal  Description: Patient's Long Term Goal:     Interventions:  -   - See additional Care Plan goals for specific interventions  Outcome: Progressing  Goal: Patient/Family Short Term Goal  Description: Patient's Short Term Goal:     Interventions:   -   - See additional Care Plan goals for specific interventions  Outcome: Progressing     Problem: PAIN - ADULT  Goal: Verbalizes/displays adequate comfort level or patient's stated pain goal  Description: INTERVENTIONS:  - Encourage pt to monitor pain and  request assistance  - Assess pain using appropriate pain scale  - Administer analgesics based on type and severity of pain and evaluate response  - Implement non-pharmacological measures as appropriate and evaluate response  - Consider cultural and social influences on pain and pain management  - Manage/alleviate anxiety  - Utilize distraction and/or relaxation techniques  - Monitor for opioid side effects  - Notify MD/LIP if interventions unsuccessful or patient reports new pain  - Anticipate increased pain with activity and pre-medicate as appropriate  Outcome: Progressing     Problem: RISK FOR INFECTION - ADULT  Goal: Absence of fever/infection during anticipated neutropenic period  Description: INTERVENTIONS  - Monitor WBC  - Administer growth factors as ordered  - Implement neutropenic guidelines  Outcome: Progressing     Problem: SAFETY ADULT - FALL  Goal: Free from fall injury  Description: INTERVENTIONS:  - Assess pt frequently for physical needs  - Identify cognitive and physical deficits and behaviors that affect risk of falls.  - [place] fall precautions as indicated by assessment.  - Educate pt/family on patient safety including physical limitations  - Instruct pt to call for assistance with activity based on assessment  - Modify environment to reduce risk of injury  - Provide assistive devices as appropriate  - Consider OT/PT consult to assist with strengthening/mobility  - Encourage toileting schedule  Outcome: Progressing     Problem: SKIN/TISSUE INTEGRITY - ADULT  Goal: Skin integrity remains intact  Description: INTERVENTIONS  - Assess and document risk factors for pressure ulcer development  - Assess and document skin integrity  - Monitor for areas of redness and/or skin breakdown  - Initiate interventions, skin care algorithm/standards of care as needed  Outcome: Progressing  Goal: Incision(s), wounds(s) or drain site(s) healing without S/S of infection  Description: INTERVENTIONS:  - Assess and  document risk factors for pressure ulcer development  - Assess and document skin integrity  - Assess and document dressing/incision, wound bed, drain sites and surrounding tissue  - Implement wound care per orders  - Initiate isolation precautions as appropriate  - Initiate Pressure Ulcer prevention bundle as indicated  Outcome: Progressing  Goal: Oral mucous membranes remain intact  Description: INTERVENTIONS  - Assess oral mucosa and hygiene practices  - Implement preventative oral hygiene regimen  - Implement oral medicated treatments as ordered  Outcome: Progressing     Problem: Delirium  Goal: Minimize duration of delirium  Description: Interventions:  - Encourage use of hearing aids, eye glasses  - Promote highest level of mobility daily  - Provide frequent reorientation  - Promote wakefulness i.e. lights on, blinds open  - Promote sleep, encourage patient's normal rest cycle i.e. lights off, TV off, minimize noise and interruptions  - Encourage family to assist in orientation and promotion of home routines  Outcome: Progressing     Problem: Diabetes/Glucose Control  Goal: Glucose maintained within prescribed range  Description: INTERVENTIONS:  - Monitor Blood Glucose as ordered  - Assess for signs and symptoms of hyperglycemia and hypoglycemia  - Administer ordered medications to maintain glucose within target range  - Assess barriers to adequate nutritional intake and initiate nutrition consult as needed  - Instruct patient on self management of diabetes  Outcome: Progressing

## 2024-09-03 ENCOUNTER — APPOINTMENT (OUTPATIENT)
Dept: GENERAL RADIOLOGY | Facility: HOSPITAL | Age: 79
DRG: 853 | End: 2024-09-03
Attending: HOSPITALIST
Payer: MEDICARE

## 2024-09-03 LAB
ALBUMIN SERPL-MCNC: 3.4 G/DL (ref 3.2–4.8)
ALP LIVER SERPL-CCNC: 283 U/L
ALT SERPL-CCNC: 33 U/L
AMMONIA PLAS-MCNC: 21 UMOL/L (ref 11–32)
ANION GAP SERPL CALC-SCNC: 12 MMOL/L (ref 0–18)
ANION GAP SERPL CALC-SCNC: 15 MMOL/L (ref 0–18)
AST SERPL-CCNC: 87 U/L (ref ?–34)
BASE EXCESS BLD CALC-SCNC: -17.1 MMOL/L (ref ?–2)
BASOPHILS # BLD: 0 X10(3) UL (ref 0–0.2)
BASOPHILS # BLD: 0.23 X10(3) UL (ref 0–0.2)
BASOPHILS NFR BLD: 0 %
BASOPHILS NFR BLD: 1 %
BILIRUB DIRECT SERPL-MCNC: 0.7 MG/DL (ref ?–0.3)
BILIRUB SERPL-MCNC: 1 MG/DL (ref 0.2–1.1)
BLOOD TYPE BARCODE: 5100
BUN BLD-MCNC: 35 MG/DL (ref 9–23)
BUN BLD-MCNC: 36 MG/DL (ref 9–23)
BUN/CREAT SERPL: 16.4 (ref 10–20)
BUN/CREAT SERPL: 18.5 (ref 10–20)
CALCIUM BLD-MCNC: 9.1 MG/DL (ref 8.7–10.4)
CALCIUM BLD-MCNC: 9.3 MG/DL (ref 8.7–10.4)
CHLORIDE SERPL-SCNC: 117 MMOL/L (ref 98–112)
CHLORIDE SERPL-SCNC: 117 MMOL/L (ref 98–112)
CO2 SERPL-SCNC: 11 MMOL/L (ref 21–32)
CO2 SERPL-SCNC: 16 MMOL/L (ref 21–32)
CREAT BLD-MCNC: 1.89 MG/DL
CREAT BLD-MCNC: 2.19 MG/DL
DEPRECATED RDW RBC AUTO: 69.8 FL (ref 35.1–46.3)
DEPRECATED RDW RBC AUTO: 77.1 FL (ref 35.1–46.3)
EGFRCR SERPLBLD CKD-EPI 2021: 23 ML/MIN/1.73M2 (ref 60–?)
EGFRCR SERPLBLD CKD-EPI 2021: 27 ML/MIN/1.73M2 (ref 60–?)
EOSINOPHIL # BLD: 0 X10(3) UL (ref 0–0.7)
EOSINOPHIL # BLD: 0.23 X10(3) UL (ref 0–0.7)
EOSINOPHIL NFR BLD: 0 %
EOSINOPHIL NFR BLD: 1 %
ERYTHROCYTE [DISTWIDTH] IN BLOOD BY AUTOMATED COUNT: 21.9 % (ref 11–15)
ERYTHROCYTE [DISTWIDTH] IN BLOOD BY AUTOMATED COUNT: 22 % (ref 11–15)
GLUCOSE BLD-MCNC: 105 MG/DL (ref 70–99)
GLUCOSE BLD-MCNC: 115 MG/DL (ref 70–99)
GLUCOSE BLDC GLUCOMTR-MCNC: 112 MG/DL (ref 70–99)
GLUCOSE BLDC GLUCOMTR-MCNC: 113 MG/DL (ref 70–99)
GLUCOSE BLDC GLUCOMTR-MCNC: 121 MG/DL (ref 70–99)
GLUCOSE BLDC GLUCOMTR-MCNC: 158 MG/DL (ref 70–99)
GLUCOSE BLDC GLUCOMTR-MCNC: 41 MG/DL (ref 70–99)
GLUCOSE BLDC GLUCOMTR-MCNC: 67 MG/DL (ref 70–99)
HCO3 BLDA-SCNC: 11.6 MEQ/L (ref 21–27)
HCT VFR BLD AUTO: 25.4 %
HCT VFR BLD AUTO: 30.1 %
HGB BLD-MCNC: 8 G/DL
HGB BLD-MCNC: 8.8 G/DL
LACTATE BLD-SCNC: 7.3 MMOL/L (ref 0.5–2)
LACTATE SERPL-SCNC: 7.4 MMOL/L (ref 0.5–2)
LYMPHOCYTES NFR BLD: 1.38 X10(3) UL (ref 1–4)
LYMPHOCYTES NFR BLD: 2.02 X10(3) UL (ref 1–4)
LYMPHOCYTES NFR BLD: 6 %
LYMPHOCYTES NFR BLD: 6 %
MAGNESIUM SERPL-MCNC: 1.8 MG/DL (ref 1.6–2.6)
MCH RBC QN AUTO: 30.4 PG (ref 26–34)
MCH RBC QN AUTO: 30.7 PG (ref 26–34)
MCHC RBC AUTO-ENTMCNC: 29.2 G/DL (ref 31–37)
MCHC RBC AUTO-ENTMCNC: 31.5 G/DL (ref 31–37)
MCV RBC AUTO: 104.2 FL
MCV RBC AUTO: 97.3 FL
METAMYELOCYTES # BLD: 0.67 X10(3) UL
METAMYELOCYTES # BLD: 2.53 X10(3) UL
METAMYELOCYTES NFR BLD: 11 %
METAMYELOCYTES NFR BLD: 2 %
MODIFIED ALLEN TEST: POSITIVE
MONOCYTES # BLD: 1.38 X10(3) UL (ref 0.1–1)
MONOCYTES # BLD: 2.69 X10(3) UL (ref 0.1–1)
MONOCYTES NFR BLD: 6 %
MONOCYTES NFR BLD: 8 %
MYELOCYTES # BLD: 0.46 X10(3) UL
MYELOCYTES # BLD: 4.03 X10(3) UL
MYELOCYTES NFR BLD: 12 %
MYELOCYTES NFR BLD: 2 %
NEUTROPHILS # BLD AUTO: 14.09 X10 (3) UL (ref 1.5–7.7)
NEUTROPHILS # BLD AUTO: 24.54 X10 (3) UL (ref 1.5–7.7)
NEUTROPHILS NFR BLD: 68 %
NEUTROPHILS NFR BLD: 70 %
NEUTS BAND NFR BLD: 3 %
NEUTS BAND NFR BLD: 3 %
NEUTS HYPERSEG # BLD: 16.79 X10(3) UL (ref 1.5–7.7)
NEUTS HYPERSEG # BLD: 23.86 X10(3) UL (ref 1.5–7.7)
NRBC BLD MANUAL-RTO: 16 %
NRBC BLD MANUAL-RTO: 6 %
O2 CT BLD-SCNC: 11.5 VOL% (ref 15–23)
OSMOLALITY SERPL CALC.SUM OF ELEC: 305 MOSM/KG (ref 275–295)
OSMOLALITY SERPL CALC.SUM OF ELEC: 309 MOSM/KG (ref 275–295)
OXYGEN LITERS/MINUTE: 2 L/MIN
PCO2 BLDA: 21 MM HG (ref 35–45)
PH BLDA: 7.23 [PH] (ref 7.35–7.45)
PHOSPHATE SERPL-MCNC: 5.7 MG/DL (ref 2.4–5.1)
PLATELET # BLD AUTO: 230 10(3)UL (ref 150–450)
PLATELET # BLD AUTO: 276 10(3)UL (ref 150–450)
PLATELET MORPHOLOGY: NORMAL
PLATELET MORPHOLOGY: NORMAL
PO2 BLDA: 78 MM HG (ref 80–100)
POTASSIUM SERPL-SCNC: 4.5 MMOL/L (ref 3.5–5.1)
POTASSIUM SERPL-SCNC: 4.9 MMOL/L (ref 3.5–5.1)
PROCALCITONIN SERPL-MCNC: 3.79 NG/ML (ref ?–0.05)
PROMYELOCYTES # BLD: 0.34 X10(3) UL
PROMYELOCYTES NFR BLD: 1 %
PROT SERPL-MCNC: 7.4 G/DL (ref 5.7–8.2)
PUNCTURE CHARGE: YES
RBC # BLD AUTO: 2.61 X10(6)UL
RBC # BLD AUTO: 2.89 X10(6)UL
SAO2 % BLDA: 96.2 % (ref 94–100)
SODIUM SERPL-SCNC: 143 MMOL/L (ref 136–145)
SODIUM SERPL-SCNC: 145 MMOL/L (ref 136–145)
T4 FREE SERPL-MCNC: 1.1 NG/DL (ref 0.8–1.7)
TOTAL CELLS COUNTED BLD: 100
TOTAL CELLS COUNTED BLD: 100
TSI SER-ACNC: 6.87 MIU/ML (ref 0.55–4.78)
UNIT VOLUME: 350 ML
WBC # BLD AUTO: 23 X10(3) UL (ref 4–11)
WBC # BLD AUTO: 33.6 X10(3) UL (ref 4–11)

## 2024-09-03 PROCEDURE — 99223 1ST HOSP IP/OBS HIGH 75: CPT | Performed by: INTERNAL MEDICINE

## 2024-09-03 PROCEDURE — 99233 SBSQ HOSP IP/OBS HIGH 50: CPT | Performed by: HOSPITALIST

## 2024-09-03 PROCEDURE — 99291 CRITICAL CARE FIRST HOUR: CPT | Performed by: HOSPITALIST

## 2024-09-03 PROCEDURE — 71045 X-RAY EXAM CHEST 1 VIEW: CPT | Performed by: HOSPITALIST

## 2024-09-03 RX ORDER — MAGNESIUM SULFATE HEPTAHYDRATE 40 MG/ML
2 INJECTION, SOLUTION INTRAVENOUS ONCE
Status: COMPLETED | OUTPATIENT
Start: 2024-09-03 | End: 2024-09-04

## 2024-09-03 NOTE — PLAN OF CARE
Patient is lethargic but arousal and can answer orientation questions. HONG orientation status. Patient nods/gestures appropriately to answer some questions.  SpO2 88% on RA. Receiving 1L of O2 per nasal cannula. Wound care provided to sacral wound per order. Dilaudid provided as needed to premedicate dressing change. Poor appetite, refusing to eat or drink anything overnight or take any oral medications Only ad one ensure in the morning, some lunch brought by family  in the noon and an apple juice in the evening. Monitoring blood sugar per order. PureWick in place with diminished urine output. Had 250  throughout. Had a BM brown and pasty. Pt is a max assist. Frequent repositioning provided as tolerated. Oral care provided. Receiving IVF and IV abx.  Remote tele monitoring in place. Plan to discharge to rehab once insurance authorization is obtained. Appropriate safety measures in place.      Problem: Patient Centered Care  Goal: Patient preferences are identified and integrated in the patient's plan of care  Description: Interventions  - What would you like us to know as we care for you?   - Provide timely, complete, and accurate information to patient/family  - Incorporate patient and family knowledge, values, beliefs, and cultural backgrounds into the planning and delivery of care  - Encourage patient/family to participate in care and decision-making at the level they choose  - Honor patient and family perspectives and choices  Outcome: Progressing

## 2024-09-03 NOTE — PLAN OF CARE
Margaret is lethargic overnight, but arouseable. Oriented x 3-4. Follows commands and nods/gestures appropriately.  Responds to verbal commands at times, but level of responsiveness waxes and wanes. Receiving 1L of O2 per nasal cannula. Attempted to wean, patient did not tolerate. Wound care provided to sacral wound per order. Dilaudid provided as needed to premedicate dressing change. Poor appetite, Encouraged increased PO intake, patient only took a few sips of ensure overnight and did not tolerate oral medications. Monitoring blood sugar per order. PureWick in place with diminished urine output.  No BM overnight. Pt is a max assist. Frequent repositioning provided as tolerated. Oral care provided. Received bed bath and hygiene care. Receiving IVF and IV abx.  Remote tele monitoring in place. Spoke with daughterBarbi to provide updates and answer questions regarding plan of care. Plan to discharge to rehab once insurance authorization is obtained. Appropriate safety measures in place.      Problem: Patient Centered Care  Goal: Patient preferences are identified and integrated in the patient's plan of care  Description: Interventions:  - What would you like us to know as we care for you?   - Provide timely, complete, and accurate information to patient/family  - Incorporate patient and family knowledge, values, beliefs, and cultural backgrounds into the planning and delivery of care  - Encourage patient/family to participate in care and decision-making at the level they choose  - Honor patient and family perspectives and choices  Outcome: Progressing     Problem: Patient/Family Goals  Goal: Patient/Family Long Term Goal  Description: Patient's Long Term Goal:     Interventions:  -   - See additional Care Plan goals for specific interventions  Outcome: Progressing  Goal: Patient/Family Short Term Goal  Description: Patient's Short Term Goal:     Interventions:   -   - See additional Care Plan goals for specific  interventions  Outcome: Progressing     Problem: PAIN - ADULT  Goal: Verbalizes/displays adequate comfort level or patient's stated pain goal  Description: INTERVENTIONS:  - Encourage pt to monitor pain and request assistance  - Assess pain using appropriate pain scale  - Administer analgesics based on type and severity of pain and evaluate response  - Implement non-pharmacological measures as appropriate and evaluate response  - Consider cultural and social influences on pain and pain management  - Manage/alleviate anxiety  - Utilize distraction and/or relaxation techniques  - Monitor for opioid side effects  - Notify MD/LIP if interventions unsuccessful or patient reports new pain  - Anticipate increased pain with activity and pre-medicate as appropriate  Outcome: Progressing     Problem: RISK FOR INFECTION - ADULT  Goal: Absence of fever/infection during anticipated neutropenic period  Description: INTERVENTIONS  - Monitor WBC  - Administer growth factors as ordered  - Implement neutropenic guidelines  Outcome: Progressing     Problem: SAFETY ADULT - FALL  Goal: Free from fall injury  Description: INTERVENTIONS:  - Assess pt frequently for physical needs  - Identify cognitive and physical deficits and behaviors that affect risk of falls.  - [place] fall precautions as indicated by assessment.  - Educate pt/family on patient safety including physical limitations  - Instruct pt to call for assistance with activity based on assessment  - Modify environment to reduce risk of injury  - Provide assistive devices as appropriate  - Consider OT/PT consult to assist with strengthening/mobility  - Encourage toileting schedule  Outcome: Progressing     Problem: SKIN/TISSUE INTEGRITY - ADULT  Goal: Skin integrity remains intact  Description: INTERVENTIONS  - Assess and document risk factors for pressure ulcer development  - Assess and document skin integrity  - Monitor for areas of redness and/or skin breakdown  - Initiate  interventions, skin care algorithm/standards of care as needed  Outcome: Progressing  Goal: Incision(s), wounds(s) or drain site(s) healing without S/S of infection  Description: INTERVENTIONS:  - Assess and document risk factors for pressure ulcer development  - Assess and document skin integrity  - Assess and document dressing/incision, wound bed, drain sites and surrounding tissue  - Implement wound care per orders  - Initiate isolation precautions as appropriate  - Initiate Pressure Ulcer prevention bundle as indicated  Outcome: Progressing  Goal: Oral mucous membranes remain intact  Description: INTERVENTIONS  - Assess oral mucosa and hygiene practices  - Implement preventative oral hygiene regimen  - Implement oral medicated treatments as ordered  Outcome: Progressing     Problem: Delirium  Goal: Minimize duration of delirium  Description: Interventions:  - Encourage use of hearing aids, eye glasses  - Promote highest level of mobility daily  - Provide frequent reorientation  - Promote wakefulness i.e. lights on, blinds open  - Promote sleep, encourage patient's normal rest cycle i.e. lights off, TV off, minimize noise and interruptions  - Encourage family to assist in orientation and promotion of home routines  Outcome: Progressing     Problem: Diabetes/Glucose Control  Goal: Glucose maintained within prescribed range  Description: INTERVENTIONS:  - Monitor Blood Glucose as ordered  - Assess for signs and symptoms of hyperglycemia and hypoglycemia  - Administer ordered medications to maintain glucose within target range  - Assess barriers to adequate nutritional intake and initiate nutrition consult as needed  - Instruct patient on self management of diabetes  Outcome: Progressing

## 2024-09-03 NOTE — PAYOR COMM NOTE
--------------  CONTINUED STAY REVIEW  extended LOS 9/3  Payor: [employer]  Subscriber #:  [id number]  Authorization Number: [id number]    Admit date: 8/12/24  Admit time:  2:41 PM    REVIEW DOCUMENTATION:  9/3         Assessment & Plan:  ----------------------------------  Infected sacral decubitus ulcer.  Present on admission.  Status postdebridement.  Positive blood culture with Staph epidermidis, probably contaminant.  -Meropenem, stopped 9/3  -ID and general surgery consults appreciated  -Wound care  -Frequent repositioning     Acute metabolic encephalopathy.  Waxing and waning.    -Reorientation  -Neurology consult appreciated  -Hold Keppra which may be contributing     GEORGE.  Due to infection, volume status.  Initial improvement, now Cr rising slowly.  Her baseline is around 1.2.  -BMP in the morning  -Monitor urine output  -Renal consult     None anion gap metabolic acidosis.  Worsening with bicarb of 16.  Slight worsening of renal function.  -Nephrology consult  -Repeat BMP in the morning     Tachypnea.  May be related to acidosis.  Patient denies shortness of breath.  -Chest x-ray  -Monitor O2 sats closely     Anemia.  Multifactorial.  Due to prolonged acute illness, CKD, frequent phlebotomy.  -Recheck hemoglobin in the morning     Remote history of seizures.  Has been off Keppra for at least 1 year.  Restarted during this admission initially for concerns of subclinical seizures.  EEG was negative.  Patient now has intermittent somnolence and confusion which may be related to prolonged hospitalization but there may be some contribution from Keppra.  -Hold Keppra  -Monitor for seizure     Other problems  Sepsis, present on admission, now resolved  Hyponatremia, resolved  UTI  Nonischemic cardiomyopathy  Chronic systolic congestive heart failure  Paroxysmal atrial fibrillation  CKD stage III  Thrombocytosis     DVT Mechanical Prophylaxis:   SCDs,        DVT Pharmacologic Prophylaxis    Medication    heparin (Porcine) 5000 UNIT/ML injection 5,000 Units               code status: dnr  dispo: to be determined  malnutrition status at bottom of note     I personally reviewed the available laboratories, imaging including. I discussed/will discuss the case with consultants. I ordered laboratories and/or radiographic studies. I adjusted medications as detailed above.  Medical decision making high, risk is high.           Subjective:  ----------------------------------  Answering questions but seems to be less interactive.  Denies pain shortness of breath.              Objective:  Chief Complaint:       Chief Complaint   Patient presents with    Wound Care      ----------------------------------  Temp:  [97.2 °F (36.2 °C)-98 °F (36.7 °C)] 97.2 °F (36.2 °C)  Pulse:  [60] 60  Resp:  [13-20] 15  BP: (125-146)/(72-89) 145/74  SpO2:  [86 %-100 %] 99 %  Gen: A+Ox2.  No distress.   HEENT: NCAT, neck supple, no carotid bruit.  CV: RRR, S1S2, and intact distal pulses. No gallop, rub, murmur.  Pulm: Effort and breath sounds normal. No distress, wheezes, rales, rhonchi.  Abd: Soft, NTND, BS normal, no mass, no HSM, no rebound/guarding.   Neuro:  Normal reflexes, CN. Sensory/motor exams grossly normal deficit.   MS: No joint effusions.  No peripheral edema.  Skin: Skin is warm and dry. No rashes, erythema, diaphoresis.   Psych:   Normal mood and affect. Calm, cooperative     Labs:        Lab Results   Component Value Date     HGB 8.0 (L) 09/03/2024     WBC 23.0 (H) 09/03/2024     .0 09/03/2024      09/03/2024     K 4.5 09/03/2024     CREATSERUM 1.89 (H) 09/03/2024     VHCO3 18.0 (L) 05/24/2024     INR 1.25 (H) 02/09/2024     AST 13 08/26/2024     ALT <7 (L) 08/26/2024          Scheduled Medications    vancomycin  125 mg Oral q12h    meropenem  500 mg Intravenous q12h    [Held by provider] levETIRAcetam  500 mg Intravenous Daily    heparin  5,000 Units Subcutaneous Q8H BROOK[place]NN    acetaminophen  650 mg Oral  Q8H    patiromer  8.4 g Oral Once    sacubitril-valsartan  1 tablet Oral BID    bumetanide  1 mg Oral Daily    metoprolol succinate  25 mg Oral Daily Beta Blocker    [Held by provider] midodrine  5 mg Oral TID    sodium hypochlorite   Topical BID    amiodarone  200 mg Oral Daily    ferrous sulfate  325 mg Oral Daily with breakfast    folic acid  800 mcg Oral Daily    gabapentin  300 mg Oral TID    [Held by provider] isosorbide dinitrate  20 mg Oral TID (Nitrates)    pantoprazole  40 mg Oral BID AC           PRN Medications     loperamide    HYDROcodone-acetaminophen    HYDROcodone-acetaminophen    HYDROmorphone **OR** [DISCONTINUED] HYDROmorphone **OR** [DISCONTINUED] HYDROmorphone    senna-docusate    polyethylene glycol (PEG 3350)    glucose **OR** glucose **OR** glucose-vitamin C **OR** dextrose **OR** glucose **OR** glucose **OR** glucose-vitamin C    traMADol    ondansetron    metoclopramide    acetaminophen           Dietitian Malnutrition Assessment           Evaluation for Malnutrition: Criteria for severe malnutrition diagnosis- acute illness/injury related to Wt loss greater than 7.5% in 3 months., Energy intake less than 50% for greater than 5 days., Body fat moderate depletion., Muscle mass moderate depletion.                  RD Malnutrition Care Plan: Adjusted diet to least restrictive to maximize intake., Encouraged increased PO intake., Encouraged small frequent meals with emphasis on high calorie/high protein., Intiated ONS (oral nutritional supplements).     Body Fat/Muscle Mass: Moderate depletion body fat., Moderate depletion muscle mass. triceps region. clavicle region.     Physician Assessment     Patient has a diagnosis of severe malnutrition          MEDICATIONS ADMINISTERED IN LAST 1 DAY:  acetaminophen (Tylenol) 160 MG/5ML oral liquid 650 mg       Date Action Dose Route User    9/3/2024 1104 Given 650 mg Oral India Modi RN          amiodarone (Pacerone) tab 200 mg       Date Action  Dose Route User    9/3/2024 0852 Given 200 mg Oral India Modi RN          bumetanide (Bumex) tab 1 mg       Date Action Dose Route User    9/3/2024 0852 Given 1 mg Oral India Modi RN          sodium hypochlorite (Dakin's) 0.125 % external solution       Date Action Dose Route User    9/3/2024 1105 Given (none) Topical India Modi RN    9/3/2024 0111 Given (none) Topical Goldberg, Rachel, RN          dextrose 5%-sodium chloride 0.9% infusion       Date Action Dose Route User    9/3/2024 0036 New Bag (none) Intravenous Goldberg, Rachel, RN          ferrous sulfate DR tab 325 mg       Date Action Dose Route User    9/3/2024 0852 Given 325 mg Oral India Modi RN          folic acid (Folvite) tab 800 mcg       Date Action Dose Route User    9/3/2024 0852 Given 800 mcg Oral India Modi RN          gabapentin (Neurontin) cap 300 mg       Date Action Dose Route User    9/3/2024 0852 Given 300 mg Oral India Modi RN    9/2/2024 1541 Given 300 mg Oral Larissa Gaston RN          heparin (Porcine) 5000 UNIT/ML injection 5,000 Units       Date Action Dose Route User    9/3/2024 1349 Given 5,000 Units Subcutaneous (Right Upper Arm) India Modi RN    9/3/2024 0408 Given 5,000 Units Subcutaneous (Left Lower Abdomen) Goldberg, Rachel, RN    9/2/2024 2049 Given by Other 5,000 Units Subcutaneous (Right Lower Abdomen) Goldberg, Rachel, RN          HYDROmorphone (Dilaudid) 1 MG/ML injection 0.2 mg       Date Action Dose Route User    9/3/2024 1109 Given 0.2 mg Intravenous India Modi RN    9/3/2024 0110 Given 0.2 mg Intravenous Goldberg, Rachel, RN    9/2/2024 1545 Given 0.2 mg Intravenous Larissa Gaston RN          meropenem (Merrem) 500 mg in sodium chloride 0.9% 100 mL IVPB-MBP       Date Action Dose Route User    9/3/2024 0853 New Bag 500 mg Intravenous India Modi RN    9/2/2024 2050 New Bag 500 mg Intravenous Goldberg, Rachel, JEFFERY          pantoprazole (Protonix) DR tab 40 mg       Date  Action Dose Route User    9/2/2024 1701 Given 40 mg Oral Alek, JEFFERY Dias          sacubitril-valsartan (Entresto) 24-26 MG per tab 1 tablet       Date Action Dose Route User    9/3/2024 0852 Given 1 tablet Oral StipicIndia RN          vancomycin (Vancocin) cap 125 mg       Date Action Dose Route User    9/2/2024 1701 Given 125 mg Oral AlekLarissa RN            Vitals (last day)       Date/Time Temp Pulse Resp BP SpO2 Weight O2 Device O2 Flow Rate (L/min) [facility]    09/03/24 1157 -- -- 16 151/78 90 % -- Nasal cannula 2 L/min     09/03/24 0338 97.2 °F (36.2 °C) 60 15 145/74 99 % -- Nasal cannula 1 L/min     09/02/24 2059 -- -- -- -- 99 % -- Nasal cannula 1 L/min     09/02/24 2058 -- -- -- -- 86 % -- None (Room air) --     09/02/24 2045 98 °F (36.7 °C) 60 20 146/89 99 % -- None (Room air) --     09/02/24 1736 -- -- 16 144/78 100 % -- -- --     09/02/24 1415 97.4 °F (36.3 °C) -- 14 134/72 99 % -- -- --     09/02/24 1240 97.8 °F (36.6 °C) -- 13 125/79 100 % -- -- --     09/02/24 1224 97.5 °F (36.4 °C) -- 14 133/79 100 % -- -- --     09/02/24 0900 -- -- -- -- -- 150 lb (68 kg) -- --     09/02/24 0815 -- -- -- 128/63 100 % -- -- --     09/02/24 0602 -- -- -- -- -- 152 lb (68.9 kg) -- --     09/02/24 0315 -- -- -- -- 100 % -- Nasal cannula 1 L/min     09/02/24 0305 97.6 °F (36.4 °C) 60 18 98/68 88 % -- None (Room air) --           CIWA Scores (since admission)       None          Blood Transfusion Record       Product Unit Status Volume Start End            Transfuse RBC       24  [id number]  [id number] Completed 09/02/24 1701 185 mL 09/02/24 1224 09/02/24 1415       24  [id number]  [id number] Completed 08/25/24 1038 307.5 mL 08/16/24 1100 08/16/24 1406

## 2024-09-03 NOTE — CM/SW NOTE
Department  notified care team of darlyn approval.    Darlyn ID [id number], approved 8/30- 9/3.      Assigned SW/CM to follow up with patient/family on discharge plan.     Nataliya Luna, DSC

## 2024-09-03 NOTE — PLAN OF CARE
Pt is alert and oriented x2. Lethargic. Difficulty speaking due to weakness. On 2 L of oxygen. Vital signs stable. Complains of pain. Dilaudid given once per POA request. Accu Q6hr. Glucose 41. Dextrose given. Repeat glucose at 67. Dextrose given again. Will endorse to night shift. Fluids stopped due to CKI and generalized edema per nephrology. Wound dressing done at noon. Safety measures in place. Will continue to monitor.     Problem: Patient Centered Care  Goal: Patient preferences are identified and integrated in the patient's plan of care  Description: Interventions:  - Provide timely, complete, and accurate information to patient/family  - Incorporate patient and family knowledge, values, beliefs, and cultural backgrounds into the planning and delivery of care  - Encourage patient/family to participate in care and decision-making at the level they choose  - Honor patient and family perspectives and choices  Outcome: Progressing     Problem: PAIN - ADULT  Goal: Verbalizes/displays adequate comfort level or patient's stated pain goal  Description: INTERVENTIONS:  - Encourage pt to monitor pain and request assistance  - Assess pain using appropriate pain scale  - Administer analgesics based on type and severity of pain and evaluate response  - Implement non-pharmacological measures as appropriate and evaluate response  - Consider cultural and social influences on pain and pain management  - Manage/alleviate anxiety  - Utilize distraction and/or relaxation techniques  - Monitor for opioid side effects  - Notify MD/LIP if interventions unsuccessful or patient reports new pain  - Anticipate increased pain with activity and pre-medicate as appropriate  Outcome: Progressing     Problem: RISK FOR INFECTION - ADULT  Goal: Absence of fever/infection during anticipated neutropenic period  Description: INTERVENTIONS  - Monitor WBC  - Administer growth factors as ordered  - Implement neutropenic guidelines  Outcome:  Progressing     Problem: SAFETY ADULT - FALL  Goal: Free from fall injury  Description: INTERVENTIONS:  - Assess pt frequently for physical needs  - Identify cognitive and physical deficits and behaviors that affect risk of falls.  - [place] fall precautions as indicated by assessment.  - Educate pt/family on patient safety including physical limitations  - Instruct pt to call for assistance with activity based on assessment  - Modify environment to reduce risk of injury  - Provide assistive devices as appropriate  - Consider OT/PT consult to assist with strengthening/mobility  - Encourage toileting schedule  Outcome: Progressing     Problem: SKIN/TISSUE INTEGRITY - ADULT  Goal: Skin integrity remains intact  Description: INTERVENTIONS  - Assess and document risk factors for pressure ulcer development  - Assess and document skin integrity  - Monitor for areas of redness and/or skin breakdown  - Initiate interventions, skin care algorithm/standards of care as needed  Outcome: Progressing  Goal: Incision(s), wounds(s) or drain site(s) healing without S/S of infection  Description: INTERVENTIONS:  - Assess and document risk factors for pressure ulcer development  - Assess and document skin integrity  - Assess and document dressing/incision, wound bed, drain sites and surrounding tissue  - Implement wound care per orders  - Initiate isolation precautions as appropriate  - Initiate Pressure Ulcer prevention bundle as indicated  Outcome: Progressing  Goal: Oral mucous membranes remain intact  Description: INTERVENTIONS  - Assess oral mucosa and hygiene practices  - Implement preventative oral hygiene regimen  - Implement oral medicated treatments as ordered  Outcome: Progressing     Problem: Delirium  Goal: Minimize duration of delirium  Description: Interventions:  - Encourage use of hearing aids, eye glasses  - Promote highest level of mobility daily  - Provide frequent reorientation  - Promote wakefulness i.e. lights  on, blinds open  - Promote sleep, encourage patient's normal rest cycle i.e. lights off, TV off, minimize noise and interruptions  - Encourage family to assist in orientation and promotion of home routines  Outcome: Progressing     Problem: Diabetes/Glucose Control  Goal: Glucose maintained within prescribed range  Description: INTERVENTIONS:  - Monitor Blood Glucose as ordered  - Assess for signs and symptoms of hyperglycemia and hypoglycemia  - Administer ordered medications to maintain glucose within target range  - Assess barriers to adequate nutritional intake and initiate nutrition consult as needed  - Instruct patient on self management of diabetes  Outcome: Progressing

## 2024-09-03 NOTE — PHYSICAL THERAPY NOTE
RN initially cleared pt to participate in PT follow up session. Pt received in bed upon entering room. RN present in room at this time and dtr also present. Pt moaning in pain and RN with plan to give dilaudid for the severity of her pain however RN notes medication tends to make pt lethargic. At this time pt is not appropriate for OOB mobility. Will continue to follow.

## 2024-09-03 NOTE — PROGRESS NOTES
[facility]  part of [facility]  Hospitalist Progress Note     Margaret Jonny Silverio Patient Status:  Inpatient    3/14/1945  79 year old CSN [id number]   Location 441/441-A Attending Otoniel Vasquez MD   Hosp Day # 22 PCP Johnathon Rodriguez,      Assessment & Plan:   ----------------------------------  Infected sacral decubitus ulcer.  Present on admission.  Status postdebridement.  Positive blood culture with Staph epidermidis, probably contaminant.  -Meropenem, stopped 9/3  -ID and general surgery consults appreciated  -Wound care  -Frequent repositioning    Acute metabolic encephalopathy.  Waxing and waning.    -Reorientation  -Neurology consult appreciated  -Hold Keppra which may be contributing    GEORGE.  Due to infection, volume status.  Initial improvement, now Cr rising slowly.  Her baseline is around 1.2.  -BMP in the morning  -Monitor urine output  -Renal consult    None anion gap metabolic acidosis.  Worsening with bicarb of 16.  Slight worsening of renal function.  -Nephrology consult  -Repeat BMP in the morning    Tachypnea.  May be related to acidosis.  Patient denies shortness of breath.  -Chest x-ray  -Monitor O2 sats closely    Anemia.  Multifactorial.  Due to prolonged acute illness, CKD, frequent phlebotomy.  -Recheck hemoglobin in the morning    Remote history of seizures.  Has been off Keppra for at least 1 year.  Restarted during this admission initially for concerns of subclinical seizures.  EEG was negative.  Patient now has intermittent somnolence and confusion which may be related to prolonged hospitalization but there may be some contribution from Keppra.  -Hold Keppra  -Monitor for seizure    Other problems  Sepsis, present on admission, now resolved  Hyponatremia, resolved  UTI  Nonischemic cardiomyopathy  Chronic systolic congestive heart failure  Paroxysmal atrial fibrillation  CKD stage III  Thrombocytosis    DVT Mechanical Prophylaxis:   SCDs,    DVT  Pharmacologic Prophylaxis   Medication    heparin (Porcine) 5000 UNIT/ML injection 5,000 Units              code status: dnr  dispo: to be determined  malnutrition status at bottom of note    I personally reviewed the available laboratories, imaging including. I discussed/will discuss the case with consultants. I ordered laboratories and/or radiographic studies. I adjusted medications as detailed above.  Medical decision making high, risk is high.    Subjective:   ----------------------------------  Answering questions but seems to be less interactive.  Denies pain shortness of breath.      Objective:   Chief Complaint:   Chief Complaint   Patient presents with    Wound Care     ----------------------------------  Temp:  [97.2 °F (36.2 °C)-98 °F (36.7 °C)] 97.2 °F (36.2 °C)  Pulse:  [60] 60  Resp:  [13-20] 15  BP: (125-146)/(72-89) 145/74  SpO2:  [86 %-100 %] 99 %  Gen: A+Ox2.  No distress.   HEENT: NCAT, neck supple, no carotid bruit.  CV: RRR, S1S2, and intact distal pulses. No gallop, rub, murmur.  Pulm: Effort and breath sounds normal. No distress, wheezes, rales, rhonchi.  Abd: Soft, NTND, BS normal, no mass, no HSM, no rebound/guarding.   Neuro: Normal reflexes, CN. Sensory/motor exams grossly normal deficit.   MS: No joint effusions.  No peripheral edema.  Skin: Skin is warm and dry. No rashes, erythema, diaphoresis.   Psych: Normal mood and affect. Calm, cooperative    Labs:  Lab Results   Component Value Date    HGB 8.0 (L) 09/03/2024    WBC 23.0 (H) 09/03/2024    .0 09/03/2024     09/03/2024    K 4.5 09/03/2024    CREATSERUM 1.89 (H) 09/03/2024    VHCO3 18.0 (L) 05/24/2024    INR 1.25 (H) 02/09/2024    AST 13 08/26/2024    ALT <7 (L) 08/26/2024          vancomycin  125 mg Oral q12h    meropenem  500 mg Intravenous q12h    [Held by provider] levETIRAcetam  500 mg Intravenous Daily    heparin  5,000 Units Subcutaneous Q8H BROOK[place]NN    acetaminophen  650 mg Oral Q8H    patiromer  8.4 g Oral Once     sacubitril-valsartan  1 tablet Oral BID    bumetanide  1 mg Oral Daily    metoprolol succinate  25 mg Oral Daily Beta Blocker    [Held by provider] midodrine  5 mg Oral TID    sodium hypochlorite   Topical BID    amiodarone  200 mg Oral Daily    ferrous sulfate  325 mg Oral Daily with breakfast    folic acid  800 mcg Oral Daily    gabapentin  300 mg Oral TID    [Held by provider] isosorbide dinitrate  20 mg Oral TID (Nitrates)    pantoprazole  40 mg Oral BID AC       loperamide    HYDROcodone-acetaminophen    HYDROcodone-acetaminophen    HYDROmorphone **OR** [DISCONTINUED] HYDROmorphone **OR** [DISCONTINUED] HYDROmorphone    senna-docusate    polyethylene glycol (PEG 3350)    glucose **OR** glucose **OR** glucose-vitamin C **OR** dextrose **OR** glucose **OR** glucose **OR** glucose-vitamin C    traMADol    ondansetron    metoclopramide    acetaminophen      Dietitian Malnutrition Assessment        Evaluation for Malnutrition: Criteria for severe malnutrition diagnosis- acute illness/injury related to Wt loss greater than 7.5% in 3 months., Energy intake less than 50% for greater than 5 days., Body fat moderate depletion., Muscle mass moderate depletion.                 RD Malnutrition Care Plan: Adjusted diet to least restrictive to maximize intake., Encouraged increased PO intake., Encouraged small frequent meals with emphasis on high calorie/high protein., Intiated ONS (oral nutritional supplements).    Body Fat/Muscle Mass: Moderate depletion body fat., Moderate depletion muscle mass. triceps region. clavicle region.    Physician Assessment    Patient has a diagnosis of severe malnutrition

## 2024-09-03 NOTE — PROGRESS NOTES
[facility] ID PROGRESS NOTE    Margaret Silverio Patient Status:  Inpatient    3/14/1945 MRN [id number]   Location [facility] 2W/SW Attending Veto Zhang MD   Hosp Day # 22 PCP Johnathon Rodriguez, DO     SUBJECTIVE  ROS done. On 2L NC. Feels better. Got U pRBC yesterday.    ASSESSMENT:     Antibiotics: Meropenem (IV ceftriaxone -, vancomycin)     # Fever--better  # Staph epidermidis bacteremia in 1/2 sets on 24 - likely contaminant               -Has bioprosthetic MVR and L chest PPM   -TTE without vegetation  # Leukocytosis - suspect reactive; r/o bacteremia; repeat cx NGTD  -Also got 1 dose of solumedrol  # GEORGE on CKD  # Anemia s/p transfusion      # Sacral wound stage 2 s/p OR debridement 24 - cx E.coli, anaerobes - currently no signs of infection     # Asymptomatic bacteruria - E. coli  # Intermittent AMS    # RA, bedbound, previously on MTX and prednisone               - received prednisone at last discharge; currently off related to limited PO     PLAN:  -  Will complete ten day course of meropenem tonight. Continue on OVP until 9/10.  -  Follow fever curve, wbc.   -  Reviewed labs, micro, imaging reports.  -  Case d/w patient, RN.     History of Present Illness:  79-year-old female with a history of severe nonischemic cardiomyopathy with multiple admissions for heart failure, A-fib, CKD, severe RA with multiple joints involved was recently discharged about 1 month prior.  During that admission had a large left pleural effusion on dobutamine, Bumex, thoracentesis, left and right heart cath showing normal coronaries but severe volume overload.  Also had GEORGE and leukopenia.  Now admitted on  with painful decubitus ulcer.  Patient is bedbound with severe RA on methotrexate and prednisone.  Afebrile, 2 L nasal cannula.  Seen by general surgery and taken the OR on  status post excision of cyst and sharp debridement of the decubitus  ulcer.  Cultures with E. coli and anaerobes.  IV ceftriaxone for sacral wound and UTI completed 8/21.  Now with fever of 100.2 on 8/22 with increased WBC.  Blood culture sent with staph not aureus in 1 out of 2 sets.  Chest x-ray with no new focal opacity with persistent edema noted.  Goals of care discussion ongoing related to poor appetite, pain control.     OBJECTIVE  /78 (BP Location: Right arm)   Pulse 60   Temp 97.2 °F (36.2 °C) (Axillary)   Resp 16   Wt 150 lb (68 kg)   SpO2 90%   BMI 25.75 kg/m²     Gen:   Awake, in bed  HEENT:  EOMI, neck supple  CV/lungs:  RRR, lungs clear in all fields  Abdom:  Soft, no TTP  Skin/extrem:  No rashes, no c/c/e, hand contractures noted  :   [place]ure[place]+  Lines:  PIV+    Laboratory Data: Reviewed     Microbiology: Reviewed     Radiology: Reviewed    [facility]  [phone]

## 2024-09-04 ENCOUNTER — APPOINTMENT (OUTPATIENT)
Dept: CT IMAGING | Facility: HOSPITAL | Age: 79
DRG: 853 | End: 2024-09-04
Attending: INTERNAL MEDICINE
Payer: MEDICARE

## 2024-09-04 ENCOUNTER — APPOINTMENT (OUTPATIENT)
Dept: CT IMAGING | Facility: HOSPITAL | Age: 79
DRG: 853 | End: 2024-09-04
Attending: HOSPITALIST
Payer: MEDICARE

## 2024-09-04 PROBLEM — A41.9 SEPSIS (HCC): Status: ACTIVE | Noted: 2023-04-22

## 2024-09-04 LAB
ALBUMIN SERPL-MCNC: 2.8 G/DL (ref 3.2–4.8)
ALBUMIN SERPL-MCNC: 2.9 G/DL (ref 3.2–4.8)
ALBUMIN/GLOB SERPL: 0.8 {RATIO} (ref 1–2)
ALBUMIN/GLOB SERPL: 0.9 {RATIO} (ref 1–2)
ALP LIVER SERPL-CCNC: 212 U/L
ALP LIVER SERPL-CCNC: 231 U/L
ALT SERPL-CCNC: 33 U/L
ALT SERPL-CCNC: 35 U/L
AMMONIA PLAS-MCNC: 42 UMOL/L (ref 11–32)
ANION GAP SERPL CALC-SCNC: 13 MMOL/L (ref 0–18)
ANION GAP SERPL CALC-SCNC: 16 MMOL/L (ref 0–18)
APTT PPP: 39.2 SECONDS (ref 23–36)
AST SERPL-CCNC: 92 U/L (ref ?–34)
AST SERPL-CCNC: 94 U/L (ref ?–34)
BASOPHILS # BLD: 0 X10(3) UL (ref 0–0.2)
BASOPHILS NFR BLD: 0 %
BILIRUB SERPL-MCNC: 1 MG/DL (ref 0.2–1.1)
BILIRUB SERPL-MCNC: 1.1 MG/DL (ref 0.2–1.1)
BILIRUB UR QL: NEGATIVE
BILIRUB UR QL: NEGATIVE
BNP SERPL-MCNC: >5000 PG/ML
BUN BLD-MCNC: 35 MG/DL (ref 9–23)
BUN BLD-MCNC: 38 MG/DL (ref 9–23)
BUN/CREAT SERPL: 18.4 (ref 10–20)
BUN/CREAT SERPL: 18.8 (ref 10–20)
CALCIUM BLD-MCNC: 7.6 MG/DL (ref 8.7–10.4)
CALCIUM BLD-MCNC: 8.5 MG/DL (ref 8.7–10.4)
CHLORIDE SERPL-SCNC: 118 MMOL/L (ref 98–112)
CHLORIDE SERPL-SCNC: 122 MMOL/L (ref 98–112)
CLARITY UR: CLEAR
CLARITY UR: CLEAR
CO2 SERPL-SCNC: 11 MMOL/L (ref 21–32)
CO2 SERPL-SCNC: 12 MMOL/L (ref 21–32)
COLOR UR: YELLOW
CREAT BLD-MCNC: 1.86 MG/DL
CREAT BLD-MCNC: 2.06 MG/DL
CREAT UR-SCNC: 36.4 MG/DL
DEPRECATED RDW RBC AUTO: 71.2 FL (ref 35.1–46.3)
EGFRCR SERPLBLD CKD-EPI 2021: 24 ML/MIN/1.73M2 (ref 60–?)
EGFRCR SERPLBLD CKD-EPI 2021: 27 ML/MIN/1.73M2 (ref 60–?)
EOSINOPHIL # BLD: 0 X10(3) UL (ref 0–0.7)
EOSINOPHIL NFR BLD: 0 %
ERYTHROCYTE [DISTWIDTH] IN BLOOD BY AUTOMATED COUNT: 21.5 % (ref 11–15)
GLOBULIN PLAS-MCNC: 3.2 G/DL (ref 2–3.5)
GLOBULIN PLAS-MCNC: 3.6 G/DL (ref 2–3.5)
GLUCOSE BLD-MCNC: 266 MG/DL (ref 70–99)
GLUCOSE BLD-MCNC: 65 MG/DL (ref 70–99)
GLUCOSE BLDC GLUCOMTR-MCNC: 102 MG/DL (ref 70–99)
GLUCOSE BLDC GLUCOMTR-MCNC: 119 MG/DL (ref 70–99)
GLUCOSE BLDC GLUCOMTR-MCNC: 62 MG/DL (ref 70–99)
GLUCOSE BLDC GLUCOMTR-MCNC: 66 MG/DL (ref 70–99)
GLUCOSE BLDC GLUCOMTR-MCNC: 80 MG/DL (ref 70–99)
GLUCOSE BLDC GLUCOMTR-MCNC: 98 MG/DL (ref 70–99)
GLUCOSE UR-MCNC: NORMAL MG/DL
GLUCOSE UR-MCNC: NORMAL MG/DL
HCT VFR BLD AUTO: 24.3 %
HGB BLD-MCNC: 7.8 G/DL
HGB UR QL STRIP.AUTO: NEGATIVE
HYALINE CASTS #/AREA URNS AUTO: PRESENT /LPF
HYALINE CASTS #/AREA URNS AUTO: PRESENT /LPF
INR BLD: 1.72 (ref 0.8–1.2)
IRON SATN MFR SERPL: 27 %
IRON SERPL-MCNC: 44 UG/DL
KETONES UR-MCNC: NEGATIVE MG/DL
KETONES UR-MCNC: NEGATIVE MG/DL
LACTATE SERPL-SCNC: 6.2 MMOL/L (ref 0.5–2)
LACTATE SERPL-SCNC: 6.6 MMOL/L (ref 0.5–2)
LEUKOCYTE ESTERASE UR QL STRIP.AUTO: 25
LEUKOCYTE ESTERASE UR QL STRIP.AUTO: NEGATIVE
LYMPHOCYTES NFR BLD: 1.43 X10(3) UL (ref 1–4)
LYMPHOCYTES NFR BLD: 4 %
MAGNESIUM SERPL-MCNC: 2.5 MG/DL (ref 1.6–2.6)
MAGNESIUM SERPL-MCNC: 2.6 MG/DL (ref 1.6–2.6)
MCH RBC QN AUTO: 31.5 PG (ref 26–34)
MCHC RBC AUTO-ENTMCNC: 32.1 G/DL (ref 31–37)
MCV RBC AUTO: 98 FL
METAMYELOCYTES # BLD: 2.14 X10(3) UL
METAMYELOCYTES NFR BLD: 6 %
MICROALBUMIN UR-MCNC: 1.7 MG/DL
MICROALBUMIN/CREAT 24H UR-RTO: 46.7 UG/MG (ref ?–30)
MONOCYTES # BLD: 1.79 X10(3) UL (ref 0.1–1)
MONOCYTES NFR BLD: 5 %
MYELOCYTES # BLD: 0.71 X10(3) UL
MYELOCYTES NFR BLD: 2 %
NEUTROPHILS # BLD AUTO: 26.29 X10 (3) UL (ref 1.5–7.7)
NEUTROPHILS NFR BLD: 79 %
NEUTS BAND NFR BLD: 4 %
NEUTS HYPERSEG # BLD: 29.63 X10(3) UL (ref 1.5–7.7)
NITRITE UR QL STRIP.AUTO: NEGATIVE
NITRITE UR QL STRIP.AUTO: NEGATIVE
NRBC BLD MANUAL-RTO: 9 %
OSMOLALITY SERPL CALC.SUM OF ELEC: 307 MOSM/KG (ref 275–295)
OSMOLALITY SERPL CALC.SUM OF ELEC: 321 MOSM/KG (ref 275–295)
PH UR: 5.5 [PH] (ref 5–8)
PH UR: 6 [PH] (ref 5–8)
PHOSPHATE SERPL-MCNC: 5.7 MG/DL (ref 2.4–5.1)
PLATELET # BLD AUTO: 203 10(3)UL (ref 150–450)
PLATELET MORPHOLOGY: NORMAL
POTASSIUM SERPL-SCNC: 4.8 MMOL/L (ref 3.5–5.1)
POTASSIUM SERPL-SCNC: 4.8 MMOL/L (ref 3.5–5.1)
PROT SERPL-MCNC: 6 G/DL (ref 5.7–8.2)
PROT SERPL-MCNC: 6.5 G/DL (ref 5.7–8.2)
PROT UR-MCNC: 70 MG/DL
PROTHROMBIN TIME: 21.2 SECONDS (ref 11.6–14.8)
RBC # BLD AUTO: 2.48 X10(6)UL
SODIUM SERPL-SCNC: 145 MMOL/L (ref 136–145)
SODIUM SERPL-SCNC: 147 MMOL/L (ref 136–145)
SP GR UR STRIP: 1.01 (ref 1–1.03)
SP GR UR STRIP: 1.02 (ref 1–1.03)
TIBC SERPL-MCNC: 162 UG/DL (ref 250–425)
TOTAL CELLS COUNTED BLD: 100
TRANSFERRIN SERPL-MCNC: 109 MG/DL (ref 250–380)
UROBILINOGEN UR STRIP-ACNC: 2
UROBILINOGEN UR STRIP-ACNC: NORMAL
WBC # BLD AUTO: 35.7 X10(3) UL (ref 4–11)

## 2024-09-04 PROCEDURE — 99233 SBSQ HOSP IP/OBS HIGH 50: CPT | Performed by: INTERNAL MEDICINE

## 2024-09-04 PROCEDURE — 99233 SBSQ HOSP IP/OBS HIGH 50: CPT | Performed by: HOSPITALIST

## 2024-09-04 PROCEDURE — 70450 CT HEAD/BRAIN W/O DYE: CPT | Performed by: HOSPITALIST

## 2024-09-04 PROCEDURE — 74176 CT ABD & PELVIS W/O CONTRAST: CPT | Performed by: INTERNAL MEDICINE

## 2024-09-04 PROCEDURE — 99291 CRITICAL CARE FIRST HOUR: CPT | Performed by: INTERNAL MEDICINE

## 2024-09-04 RX ORDER — BUMETANIDE 0.25 MG/ML
1 INJECTION INTRAMUSCULAR; INTRAVENOUS DAILY
Status: DISCONTINUED | OUTPATIENT
Start: 2024-09-04 | End: 2024-09-06

## 2024-09-04 RX ORDER — DEXTROSE MONOHYDRATE AND SODIUM CHLORIDE 5; .9 G/100ML; G/100ML
INJECTION, SOLUTION INTRAVENOUS CONTINUOUS
Status: DISCONTINUED | OUTPATIENT
Start: 2024-09-04 | End: 2024-09-04

## 2024-09-04 NOTE — CONSULTS
[facility]  part of [facility]    Report of Consultation    Margaret Silverio Patient Status:  Inpatient    3/14/1945 MRN [id number]   Location [facility] 2W/SW Attending Otoniel Vasquez MD   Hosp Day # 23 PCP Johnathon Rodriguez DO     Date of Admission:  2024  Date of Consult: 2024    Reason for Consultation:   Consults  Sepsis   Severe metabolic acidosis  Encephalopathy and unresponsiveness      HPI:     Chief Complaint   Patient presents with    Wound Care     HPI    79-year-old female with history of severe nonischemic cardiomyopathy HFrEF, CKD , pacemaker , h/p AVR , severe RA with joint deformities , chronic basilar pleural effusion s/p right thora in 2024 (negative cytology and culture )  Poor functional status , chronic sacral decub .    Admitted on 2024 with infected sacral decub and UTI and pancytopenia secondary to methotrexate.  Patient evaluated by ID and hospitalist and renal and surgery with very prolonged hospital course.    Transferred to ICU with severe lethargy and obtundation with worsening metabolic acidosis with lactic acid of 6.2 with a pH of 7.23 and pCO2 of 21 and pO2 of 78  Significant leukocytosis  Patient obtunded and very lethargic not providing any history  Comfortable on 4 L of oxygen with 98% O2 sat  Sepsis reassessment protocol was started patient received sepsis bolus  Chest x-ray with chronic changes  Patient now in ICU  Chart and notes and labs and records are reviewed  No further review of system able to obtain from patient         History     Past Medical History:    Acute kidney insufficiency    Anemia    Arrhythmia    Back problem    CHF (congestive heart failure) (HCC)    CKD (chronic kidney disease) stage 3, GFR 30-59 ml/min (HCC)    Deep vein thrombosis (HCC)    on B.T for only a month, possibly Magen    Essential hypertension    High blood pressure    History of blood transfusion    Rheumatoid arthritis (HCC)    Seizure  disorder (HCC)    Pt denied at screening call 6/28/24     Past Surgical History:   Procedure Laterality Date    Cabg      Cardiac pacemaker placement      Colonoscopy N/A 01/17/2024    Dr. Rios    Colonoscopy N/A 01/17/2024    Procedure: COLONOSCOPY;  Surgeon: Zoraida Rios MD;  Location: [facility] ENDOSCOPY    Egd N/A 01/17/2024    Dr. Rios    Fracture surgery      Other surgical history  2017    Heart Surgery     Other surgical history  2020    Bilateral shoulder replacement      No family history on file.  Social History:  Social History     Socioeconomic History    Marital status:    Tobacco Use    Smoking status: Former     Current packs/day: 0.00     Types: Cigarettes     Quit date: 2014     Years since quitting: 10.6    Smokeless tobacco: Never   Vaping Use    Vaping status: Never Used   Substance and Sexual Activity    Alcohol use: Never    Drug use: Never     Social Determinants of Health     Financial Resource Strain: Low Risk  (6/4/2024)    Financial Resource Strain     Difficulty of Paying Living Expenses: Not very hard     Med Affordability: No   Food Insecurity: Unknown (8/12/2024)    Food Insecurity     Food Insecurity: Patient declined   Transportation Needs: Unknown (8/12/2024)    Transportation Needs     Lack of Transportation: Patient declined   Housing Stability: Unknown (8/12/2024)    Housing Stability     Housing Instability: Patient declined     Allergies/Medications:   Allergies:   Allergies   Allergen Reactions    Lisinopril Coughing     Medications Prior to Admission   Medication Sig    bumetanide 1 MG Oral Tab Take 1 tablet (1 mg total) by mouth daily.    carvedilol 6.25 MG Oral Tab Take 0.5 tablets (3.125 mg total) by mouth 2 (two) times daily with meals.    sennosides (SENNA-TIME) 8.6 MG Oral Tab senna 8.6 mg tablet, [RxNorm: 020697]    cyclobenzaprine 10 MG Oral Tab Take 1 tablet (10 mg total) by mouth nightly.    fentaNYL 12 MCG/HR Transdermal Patch 72 Hr Place 1 patch onto the skin  every third day. (Patient taking differently: Place 1 patch onto the skin every third day. Placed 8/10 left arm)    midodrine 5 MG Oral Tab Take 1 tablet (5 mg total) by mouth 2 (two) times daily before meals.    senna-docusate 8.6-50 MG Oral Tab Take 2 tablets by mouth daily.    isosorbide dinitrate 20 MG Oral Tab Take 1 tablet (20 mg total) by mouth TID (Nitrates).    gabapentin 300 MG Oral Cap Take 1 capsule (300 mg total) by mouth 3 (three) times daily.    dapagliflozin (FARXIGA) 10 MG Oral Tab Take 1 tablet (10 mg total) by mouth daily.    spironolactone 25 MG Oral Tab     alendronate 70 MG Oral Tab Take 1 tablet (70 mg total) by mouth once a week. (Patient taking differently: Take 1 tablet (70 mg total) by mouth once a week. Every Tuesday)    methotrexate 2.5 MG Oral Tab TAKE 6 TABLETS BY MOUTH 1 TIME A WEEK    lidocaine-menthol 4-1 % External Patch Place 1 patch onto the skin daily.    PANTOPRAZOLE 40 MG Oral Tab EC TAKE 1 TABLET(40 MG) BY MOUTH TWICE DAILY BEFORE MEALS    SACUBITRIL-VALSARTAN 49-51 MG Oral Tab Take 1 tablet by mouth 2 (two) times daily.    folic acid 800 MCG Oral Tab Take 1 tablet (800 mcg total) by mouth daily.    amiodarone 200 MG Oral Tab Take 1 tablet (200 mg total) by mouth daily.    ferrous sulfate 325 (65 FE) MG Oral Tab EC Take 1 tablet (325 mg total) by mouth daily with breakfast.    [] HYDROcodone-acetaminophen (NORCO)  MG Oral Tab Take 1 tablet by mouth every 8 (eight) hours as needed for Pain.    polyethylene glycol, PEG 3350, 17 g Oral Powd Pack Take 17 g by mouth daily as needed.    [] predniSONE 20 MG Oral Tab Take 1 tablet (20 mg total) by mouth daily with breakfast for 5 days.    Naloxone HCl 4 MG/0.1ML Nasal Liquid 4 mg by Nasal route as needed. If patient remains unresponsive, repeat dose in other nostril 2-5 minutes after first dose.    acetaminophen (TYLENOL EXTRA STRENGTH) 500 MG Oral Tab Take 1 tablet (500 mg total) by mouth every 6 (six) hours as  needed for Pain.    [] gabapentin 300 MG Oral Cap Take 1 capsule (300 mg total) by mouth 2 (two) times daily. (Patient not taking: Reported on 2024)       Review of Systems:     Unable to perform ROS      Physical Exam:   Vital Signs:   weight is 143 lb 8.3 oz (65.1 kg). Her temporal temperature is 98.1 °F (36.7 °C). Her blood pressure is 134/76 and her pulse is 60. Her respiration is 32 (abnormal) and oxygen saturation is 96%.   Physical Exam  Vitals and nursing note reviewed.   Constitutional:       General: She is in acute distress.      Appearance: She is ill-appearing.   HENT:      Head: Atraumatic.      Nose: Nose normal.      Mouth/Throat:      Mouth: Mucous membranes are dry.   Eyes:      General: No scleral icterus.  Neck:      Comments: JVD +  Cardiovascular:      Rate and Rhythm: Regular rhythm. Tachycardia present.      Heart sounds: Murmur heard.   Pulmonary:      Breath sounds: No wheezing or rhonchi.      Comments: Diminished in the bases with basilar crackles  Abdominal:      General: There is distension.      Palpations: Abdomen is soft.      Tenderness: There is no guarding or rebound.      Comments: Diminished bowel sounds   Musculoskeletal:      Cervical back: Neck supple.      Right lower leg: No edema.      Left lower leg: No edema.   Neurological:      Comments: Very lethargic and unresponsive  Does not follow any commands         Results:     Lab Results   Component Value Date    WBC 35.7 (H) 2024    HGB 7.8 (L) 2024    HCT 24.3 (L) 2024    .0 2024    CREATSERUM 1.86 (H) 2024    BUN 35 (H) 2024     (H) 2024    K 4.8 2024     (H) 2024    CO2 12.0 (L) 2024     (H) 2024    CA 7.6 (L) 2024    ALB 2.8 (L) 2024    ALKPHO 212 (H) 2024    BILT 1.0 2024    TP 6.0 2024    AST 94 (H) 2024    ALT 33 2024    PTT 39.2 (H) 2024    INR 1.72 (H) 2024     T4F 1.1 09/03/2024    TSH 6.866 (H) 09/03/2024    LIP 32 01/13/2024    ESRML 40 (H) 07/17/2024    CRP 9.00 (H) 07/17/2024    MG 2.5 09/04/2024    MG 2.6 09/04/2024    PHOS 5.7 (H) 09/04/2024    TROPHS 14 07/18/2024    B12 >2,000 (H) 07/01/2024     CT BRAIN OR HEAD (CPT=70450)    Result Date: 9/4/2024  CONCLUSION:  1. No acute intracranial process by noncontrast CT technique.  2. Senescent changes of parenchymal volume loss with sequela of chronic microvascular ischemic disease. There is also large vessel atherosclerosis.   3. An empty sella is seen with an expansile appearance of the sella turcica, which may reflect normal variation or could be sequela of benign idiopathic intracranial hypertension.  4. Lesser incidental findings as above.   Dictated by (CST): Corey Posey MD on 9/04/2024 at 11:19 AM     Finalized by (CST): Corey Posey MD on 9/04/2024 at 11:22 AM          XR CHEST AP PORTABLE  (CPT=71045)    Result Date: 9/3/2024  CONCLUSION:  1. Moderate cardiomegaly prominent central vasculature. 2. Bilateral perihilar and lower lobe parenchymal abnormality with moderate pleural effusions.  Slight improved aeration in the upper lobes.    Dictated by (CST): Justin Stark MD on 9/03/2024 at 10:59 AM     Finalized by (CST): Justin Stark MD on 9/03/2024 at 11:02 AM               Impression:       1-severe sepsis  Possible intra-abdominal source  Infected decubitus  Patient already on broad-spectrum antibiotics    Plan ;  Sepsis reassessment  Check cultures  Abdomen CT  Antibiotics per ID  Close monitoring in the ICU    2-infected sacral decubitus ulcer  Presented on admission  S/p surgical debridement  ID and surgery following  Wound following    3-lactic and metabolic acidosis  With underlying acute on chronic kidney injury  Started on bicarb drip and renal following    4-severe nonischemic cardiomyopathy with severe pulmonary hypertension  Significant cardiomegaly on CXR with chronic basilar  effusion  S/p right thoracentesis 7/12/2024 negative cytology and culture likely secondary to CHF and CKD    5-acute toxic and metabolic encephalopathy  Unresponsive  Head CT negative  Supportive care    6-severe rheumatoid arthritis with significant joint deformities  Bedbound with chronic sacral decub    7-acute on chronic respiratory failure with hypoxia  O2 therapy    8-DVT prophylaxis  Heparin subcu    9-DNAR/select  Prognosis is guarded and further setting of limited appropriate  Consult palliative care    D/w staff   D/w Dr Villareal   35 min cct           [facility]  part of [facility]      Sepsis Reassessment Note    /76 (BP Location: Left arm)   Pulse 60   Temp 98.1 °F (36.7 °C) (Temporal)   Resp (!) 32   Wt 149 lb 14.6 oz (68 kg)   SpO2 96%   BMI 25.73 kg/m²      I completed the sepsis reassessment at 10 am     Cardiac:  Regularity: Regular  Rate: Tachycardic  Heart Sounds: S1,S2    Lungs:   Right: Clear  Left: Rales    Peripheral Pulses:  Radial: Right 1+ or Left 1+      Capillary Refill:  <3 Secs    Skin:  Temp/Moisture: Warm and Dry  Color: Normal      Linsey Liao MD  9/4/2024  12:01 PM                     Linsey Liao MD  9/4/2024

## 2024-09-04 NOTE — PROGRESS NOTES
[facility] ID PROGRESS NOTE    Margaret Silverio Patient Status:  Inpatient    3/14/1945 MRN [id number]   Location [facility] 2W/SW Attending Veto Zhang MD   Hosp Day # 23 PCP Johnathon Rodriguez, DO     SUBJECTIVE  ROS limited. Desaturated last night and transferred to PCU on NRB.    ASSESSMENT:     Antibiotics: Meropenem, vancomycin (IV ceftriaxone -     # Acute sepsis with lactic acidosis with hypoxia, R/o bacteremia ?ischemic colitis  # GEORGE  # Staph epidermidis bacteremia in 1/2 sets on 24 - likely contaminant               -Has bioprosthetic MVR and L chest PPM   -TTE without vegetation  # Leukocytosis - suspect reactive; r/o bacteremia; repeat cx NGTD  -Also got 1 dose of solumedrol  # GEORGE on CKD  # Anemia s/p transfusion      # Sacral wound stage 2 s/p OR debridement 24 - cx E.coli, anaerobes - currently no signs of infection     # Asymptomatic bacteruria - E. coli  # Intermittent AMS    # RA, bedbound, previously on MTX and prednisone               - received prednisone at last discharge; currently off related to limited PO     PLAN:  -  Meropenem restarted. Continue on vancomycin.  -  FU cx.  -  Follow fever curve, wbc.   -  Reviewed labs, micro, imaging reports.  -  Case d/w patient, RN.     History of Present Illness:  79-year-old female with a history of severe nonischemic cardiomyopathy with multiple admissions for heart failure, A-fib, CKD, severe RA with multiple joints involved was recently discharged about 1 month prior.  During that admission had a large left pleural effusion on dobutamine, Bumex, thoracentesis, left and right heart cath showing normal coronaries but severe volume overload.  Also had GEORGE and leukopenia.  Now admitted on  with painful decubitus ulcer.  Patient is bedbound with severe RA on methotrexate and prednisone.  Afebrile, 2 L nasal cannula.  Seen by general surgery and taken the OR on  status post  excision of cyst and sharp debridement of the decubitus ulcer.  Cultures with E. coli and anaerobes.  IV ceftriaxone for sacral wound and UTI completed 8/21.  Now with fever of 100.2 on 8/22 with increased WBC.  Blood culture sent with staph not aureus in 1 out of 2 sets.  Chest x-ray with no new focal opacity with persistent edema noted.  Goals of care discussion ongoing related to poor appetite, pain control.     OBJECTIVE  /76 (BP Location: Left arm)   Pulse 60   Temp 98.1 °F (36.7 °C) (Temporal)   Resp (!) 32   Wt 143 lb 8.3 oz (65.1 kg)   SpO2 96%   BMI 24.64 kg/m²     Gen:   Lethargic in bed  HEENT:  EOMI, neck supple  CV/lungs:  RRR, lungs clear in all fields  Abdom:  Soft, no TTP  Skin/extrem:  No rashes, no c/c/e, hand contractures noted  :   Purewick+  Lines:  PIV+    Laboratory Data: Reviewed     Microbiology: Reviewed     Radiology: Reviewed    [facility]  [phone]

## 2024-09-04 NOTE — CONSULTS
[facility]    PATIENT'S NAME: DORIS ALEMAN   ATTENDING PHYSICIAN: Otoniel Vasquez MD   CONSULTING PHYSICIAN: Eyad Arvizu MD   PATIENT ACCOUNT#:   [id number]    LOCATION:  [address] [place] [address]  MEDICAL RECORD #:   [id number]       YOB: 1945  ADMISSION DATE:       08/12/2024      CONSULT DATE:  09/03/2024    REPORT OF CONSULTATION    HISTORY OF PRESENT ILLNESS:  The patient is a 79-year-old female known to my service with a history of chronic kidney disease stage 3 to 4, history of a nonischemic cardiomyopathy status post AICD, paroxysmal atrial fibrillation status post bioprosthetic mitral valve replacement, history of severe rheumatoid arthritis, hypertension, anemia of chronic disease but also secondary to recurrent GI bleeds from AVMs, who was hospitalized on August 12, 2024 for a sacral decubitus ulcer.  The patient has undergone surgical debridement.  The wound is healing well, but the patient has been having problems with waxing and waning volume status and fluctuating creatinines.  Renal consultation has now been called.    The patient just had a repeat echocardiogram which showed improvement.  This was done on August 24, 2024.  Her left ventricular ejection fraction was 40% to 45% with pulmonary hypertension.    PAST MEDICAL HISTORY:  As stated above.  The patient has this nonischemic cardiomyopathy, paroxysmal atrial fibrillation, status post AICD, status post mitral valve replacement.  In addition, she also has a history of hypertension, rheumatoid arthritis, and has a history of chronic kidney disease stage 3 to 4.  Reviewing recent lab shows that on July 22 she had a creatinine 1.96 with an estimated GFR of 26 mL/minute.  On August 12 when she arrived during this admission, her creatinine was 2.69, although possible laboratory error.  On August 30, 2024, her creatinine was 0.99, but on September 2, her creatinine was 1.83 and today her creatinine is now  1.89.    MEDICATIONS:  Her current medications include meropenem; amiodarone; Bumex 1 mg daily; ferrous sulfate; folic acid; gabapentin; subcutaneous heparin;  Keppra and Isordil, currently on hold; Toprol-XL 25 mg daily; midodrine, currently on hold; Protonix; Veltassa; Entresto 24/26 one b.i.d.; vancomycin orally.      ALLERGIES:  The patient is allergic to lisinopril and developed a cough.    SOCIAL HISTORY:  Currently nonsmoker.  According to the daughter, she was actually ambulatory with a walker before her July hospitalization.    FAMILY HISTORY:  Negative for renal pathology.    REVIEW OF SYSTEMS:  The patient is really lethargic at the present time and unable to give me any history.    PHYSICAL EXAMINATION:  GENERAL:  Again, the patient is very lethargic and poorly responsive.  VITAL SIGNS:  Blood pressure 151/78, with a pulse of 60, respirations 16, temperature 97.2, O2 saturation was 90% on 1L to 2L.  NECK:  Supple without JVD.  LUNGS:  Grossly clear.  HEART:  Regular rate and rhythm without gallops or murmurs.  ABDOMEN:  Soft, flat, nontender without organomegaly.  EXTREMITIES:  There is 1+ edema in all 4 extremities.    LABORATORY DATA:  Today, show a BUN and creatinine of 35 and 1.89, respectively, with a CO2 of 16, anion gap of 12, potassium 4.5.  White count 23.0, hemoglobin 8.0.  Urine studies pending.    Chest x-ray done today shows moderate cardiomegaly with prominent central vasculature.  There is bilateral perihilar and lower lobe parenchymal abnormalities with moderate pleural effusions.    IMPRESSION:  The patient is a 79-year-old female now with:    1.   Chronic kidney disease stage 3 to 4.  Creatinine seems to fluctuate a fair amount depending upon volume status, but also secondary to diuretics and Entresto.  At the present time, she seems to be volume overloaded.  2.   History of nonischemic cardiomyopathy with a history of paroxysmal atrial fibrillation, status post mitral valve  replacement, status post AICD.  3.   Severe rheumatoid arthritis.  4.   Hypertension.  5.   Sacral decubitus ulcer.  6.   Anemia, rule out iron deficiency.    PLAN:  The patient has been receiving IV fluids, but I believe she is fluid overload and, therefore, will discontinue.  Will follow with I's and O's, daily weights, and serial chemistries.  She had a CT scan of the abdomen done on August 26, 2024, and the kidneys appeared to be unremarkable with no masses or hydronephrosis.  Check iron studies.  If creatinine should increase significantly, may need to decrease dose of Entresto.  I have discussed the above with Nursing and the patient's daughter.    Dictated By Eyad Arvizu MD  d: 09/03/2024 17:26:54  t: 09/03/2024 17:54:07  Job [id number]  [facility]/

## 2024-09-04 NOTE — SIGNIFICANT EVENT
RRT    *See RRT Documentation Record*    Reason the RRT was called: change in respiratory status, wheezing, shortness of breath  Assessment of patient leading up to RRT: minimally responsive, dyspnea at rest, shallow breathing, wheezing  Interventions/Testing: ABGs, labs, EKG  Patient Outcome/Disposition: transferred to CCU room 217  Family Notified: yes  Name of family notified: Barbi Silverio

## 2024-09-04 NOTE — PLAN OF CARE
Lethargic, movement to painful stimuli. CT head and abdomen/pelvis. Family updated on plan of care. All safety measures maintained.     Problem: PAIN - ADULT  Goal: Verbalizes/displays adequate comfort level or patient's stated pain goal  Description: INTERVENTIONS:  - Encourage pt to monitor pain and request assistance  - Assess pain using appropriate pain scale  - Administer analgesics based on type and severity of pain and evaluate response  - Implement non-pharmacological measures as appropriate and evaluate response  - Consider cultural and social influences on pain and pain management  - Manage/alleviate anxiety  - Utilize distraction and/or relaxation techniques  - Monitor for opioid side effects  - Notify MD/LIP if interventions unsuccessful or patient reports new pain  - Anticipate increased pain with activity and pre-medicate as appropriate  Outcome: Progressing     Problem: RISK FOR INFECTION - ADULT  Goal: Absence of fever/infection during anticipated neutropenic period  Description: INTERVENTIONS  - Monitor WBC  - Administer growth factors as ordered  - Implement neutropenic guidelines  Outcome: Progressing     Problem: SKIN/TISSUE INTEGRITY - ADULT  Goal: Incision(s), wounds(s) or drain site(s) healing without S/S of infection  Description: INTERVENTIONS:  - Assess and document risk factors for pressure ulcer development  - Assess and document skin integrity  - Assess and document dressing/incision, wound bed, drain sites and surrounding tissue  - Implement wound care per orders  - Initiate isolation precautions as appropriate  - Initiate Pressure Ulcer prevention bundle as indicated  Outcome: Progressing     Problem: RESPIRATORY - ADULT  Goal: Achieves optimal ventilation and oxygenation  Description: INTERVENTIONS:  - Assess for changes in respiratory status  - Assess for changes in mentation and behavior  - Position to facilitate oxygenation and minimize respiratory effort  - Oxygen supplementation  based on oxygen saturation or ABGs  - Provide Smoking Cessation handout, if applicable  - Encourage broncho-pulmonary hygiene including cough, deep breathe, Incentive Spirometry  - Assess the need for suctioning and perform as needed  - Assess and instruct to report SOB or any respiratory difficulty  - Respiratory Therapy support as indicated  - Manage/alleviate anxiety  - Monitor for signs/symptoms of CO2 retention  Outcome: Progressing     Problem: CARDIOVASCULAR - ADULT  Goal: Maintains optimal cardiac output and hemodynamic stability  Description: INTERVENTIONS:  - Monitor vital signs, rhythm, and trends  - Monitor for bleeding, hypotension and signs of decreased cardiac output  - Evaluate effectiveness of vasoactive medications to optimize hemodynamic stability  - Monitor arterial and/or venous puncture sites for bleeding and/or hematoma  - Assess quality of pulses, skin color and temperature  - Assess for signs of decreased coronary artery perfusion - ex. Angina  - Evaluate fluid balance, assess for edema, trend weights  Outcome: Progressing  Goal: Absence of cardiac arrhythmias or at baseline  Description: INTERVENTIONS:  - Continuous cardiac monitoring, monitor vital signs, obtain 12 lead EKG if indicated  - Evaluate effectiveness of antiarrhythmic and heart rate control medications as ordered  - Initiate emergency measures for life threatening arrhythmias  - Monitor electrolytes and administer replacement therapy as ordered  Outcome: Progressing

## 2024-09-04 NOTE — PHYSICAL THERAPY NOTE
Physical Therapy Contact Note    Orders received and chart reviewed. Attempted to see patient for Physical Therapy services. Per discussion with RN, patient with increased  work of breathing at rest and decreased responsiveness. Will re-schedule patient visit pending patient availability/appropriateness as schedule allows.    Jeniffer Buitrago, PT, DPT

## 2024-09-04 NOTE — PROGRESS NOTES
[facility]  part of [facility]  Hospitalist Progress Note     Margaret Jonny Silverio Patient Status:  Inpatient    3/14/1945  79 year old CSN [id number]   Location 441/441-A Attending Otoniel Vasquez MD   Hosp Day # 23 PCP Johnathon Rodriguez DO     Assessment & Plan:   ----------------------------------    Sepsis, acute, present on admission.  Hypotension not present.  Lactic acid level is elevated.  Source of infection unknwon.  Endorgan damage in form of ams.  Tachycardia present.  Tachypnea present.  Hypoxia present.  Fevers/hypothermia present.  Leukocytosis present.  -Sepsis bolus IV fluids done  -Antibiotics: merrem vanc  -Blood cultures: ngtd  -Serial lactic acid if indicated  -ID consult anna  -Monitor temperature and white blood cell count  -Critical care consult    Infected sacral decubitus ulcer.  Present on admission.  Status postdebridement.  Positive blood culture with Staph epidermidis, probably contaminant.  -Meropenem, stopped 9/3  -ID and general surgery consults appreciated  -Wound care  -Frequent repositioning    Acute metabolic encephalopathy.  Waxing and waning.    -Reorientation  -Neurology consult appreciated  -Hold Keppra which may be contributing    GEORGE.  Due to infection, volume status.  Initial improvement, now Cr rising slowly.  Her baseline is around 1.2.  -BMP in the morning  -Monitor urine output  -Renal consult    None anion gap metabolic acidosis.  Worsening with bicarb of 11.  Slight worsening of renal function.  ABG shows acidosis with compensatory respiratory alkalosis.  -Nephrology consult  -Repeat BMP in the morning    Tachypnea.  May be related to acidosis.  Patient denies shortness of breath.  -Chest x-ray and abg noted  -Monitor O2 sats closely    Anemia.  Multifactorial.   -Recheck hemoglobin in the morning    Remote history of seizures.  Has been off Keppra for at least 1 year.  Restarted during this admission initially for concerns of subclinical  seizures.  EEG was negative.  Patient now has intermittent somnolence and confusion which may be related to prolonged hospitalization but there may be some contribution from Keppra.  -Hold Keppra  -Monitor for seizure    Other problems  Sepsis, present on admission, now resolved  Hyponatremia, resolved  UTI  Nonischemic cardiomyopathy  Chronic systolic congestive heart failure  Paroxysmal atrial fibrillation  CKD stage III  Thrombocytosis    DVT Mechanical Prophylaxis:   SCDs,    DVT Pharmacologic Prophylaxis   Medication    heparin (Porcine) 5000 UNIT/ML injection 5,000 Units              code status: dnr  dispo: to be determined  malnutrition status at bottom of note    I personally reviewed the available laboratories, imaging including. I discussed/will discuss the case with consultants. I ordered laboratories and/or radiographic studies. I adjusted medications as detailed above.  Medical decision making high, risk is high.    Subjective:   ----------------------------------  Rouses to her name but not waking up or giving answers to questions.  Overnight events noted.      Objective:   Chief Complaint:   Chief Complaint   Patient presents with    Wound Care     ----------------------------------  Temp:  [98.1 °F (36.7 °C)-99.2 °F (37.3 °C)] 98.1 °F (36.7 °C)  Pulse:  [59-77] 60  Resp:  [15-32] 32  BP: (118-151)/() 134/76  SpO2:  [82 %-100 %] 96 %  Gen: A+Ox2.  No distress.   HEENT: NCAT, neck supple, no carotid bruit.  CV: RRR, S1S2, and intact distal pulses. No gallop, rub, murmur.  Pulm: Effort and breath sounds normal. No distress, wheezes, rales, rhonchi.  Abd: Soft, NTND, BS normal, no mass, no HSM, no rebound/guarding.   Neuro: Normal reflexes, CN. Sensory/motor exams grossly normal deficit.   MS: No joint effusions.  No peripheral edema.  Skin: Skin is warm and dry. No rashes, erythema, diaphoresis.   Psych: Normal mood and affect. Calm, cooperative    Labs:  Lab Results   Component Value Date    HGB  7.8 (L) 09/04/2024    WBC 35.7 (H) 09/04/2024    .0 09/04/2024     09/04/2024    K 4.8 09/04/2024    CREATSERUM 2.06 (H) 09/04/2024    VHCO3 18.0 (L) 05/24/2024    INR 1.72 (H) 09/04/2024    AST 92 (H) 09/04/2024    ALT 35 09/04/2024          pantoprazole  40 mg Intravenous Daily    vancomycin  15 mg/kg Intravenous Q24H    meropenem  500 mg Intravenous q12h    vancomycin  125 mg Oral q12h    [Held by provider] levETIRAcetam  500 mg Intravenous Daily    heparin  5,000 Units Subcutaneous Q8H BROOKLYNN    acetaminophen  650 mg Oral Q8H    sacubitril-valsartan  1 tablet Oral BID    bumetanide  1 mg Oral Daily    metoprolol succinate  25 mg Oral Daily Beta Blocker    [Held by provider] midodrine  5 mg Oral TID    sodium hypochlorite   Topical BID    amiodarone  200 mg Oral Daily    ferrous sulfate  325 mg Oral Daily with breakfast    folic acid  800 mcg Oral Daily    gabapentin  300 mg Oral TID    [Held by provider] isosorbide dinitrate  20 mg Oral TID (Nitrates)       loperamide    HYDROcodone-acetaminophen    HYDROcodone-acetaminophen    HYDROmorphone **OR** [DISCONTINUED] HYDROmorphone **OR** [DISCONTINUED] HYDROmorphone    senna-docusate    polyethylene glycol (PEG 3350)    glucose **OR** glucose **OR** glucose-vitamin C **OR** dextrose **OR** glucose **OR** glucose **OR** glucose-vitamin C    traMADol    ondansetron    metoclopramide    acetaminophen      Dietitian Malnutrition Assessment        Evaluation for Malnutrition: Criteria for severe malnutrition diagnosis- acute illness/injury related to Wt loss greater than 7.5% in 3 months., Energy intake less than 50% for greater than 5 days., Body fat moderate depletion., Muscle mass moderate depletion.                 RD Malnutrition Care Plan: Adjusted diet to least restrictive to maximize intake., Encouraged increased PO intake., Encouraged small frequent meals with emphasis on high calorie/high protein., Intiated ONS (oral nutritional supplements).    Body  Fat/Muscle Mass: Moderate depletion body fat., Moderate depletion muscle mass. triceps region. clavicle region.    Physician Assessment    Patient has a diagnosis of severe malnutrition

## 2024-09-04 NOTE — CM/SW NOTE
Patient is in PCU.  Palliative was previously following.    Macy from Palliative updated on status.  Updates sent to [facility] ([place]) which was the plan prior to returning to PCU.    Sandy Raza MBA BSN RN CRRN   RN Case Manager  [phone]

## 2024-09-04 NOTE — PLAN OF CARE
Problem: PAIN - ADULT  Goal: Verbalizes/displays adequate comfort level or patient's stated pain goal  Description: INTERVENTIONS:  - Encourage pt to monitor pain and request assistance  - Assess pain using appropriate pain scale  - Administer analgesics based on type and severity of pain and evaluate response  - Implement non-pharmacological measures as appropriate and evaluate response  - Consider cultural and social influences on pain and pain management  - Manage/alleviate anxiety  - Utilize distraction and/or relaxation techniques  - Monitor for opioid side effects  - Notify MD/LIP if interventions unsuccessful or patient reports new pain  - Anticipate increased pain with activity and pre-medicate as appropriate  9/4/2024 0803 by Abdoulaye Marie RN  Outcome: Progressing  9/4/2024 0802 by Abdoulaye Marie RN  Outcome: Progressing     Problem: RISK FOR INFECTION - ADULT  Goal: Absence of fever/infection during anticipated neutropenic period  Description: INTERVENTIONS  - Monitor WBC  - Administer growth factors as ordered  - Implement neutropenic guidelines  9/4/2024 0803 by Abdoulaye Marie RN  Outcome: Progressing  9/4/2024 0802 by Abdoulaye Marie RN  Outcome: Progressing     Problem: SAFETY ADULT - FALL  Goal: Free from fall injury  Description: INTERVENTIONS:  - Assess pt frequently for physical needs  - Identify cognitive and physical deficits and behaviors that affect risk of falls.  - [place] fall precautions as indicated by assessment.  - Educate pt/family on patient safety including physical limitations  - Instruct pt to call for assistance with activity based on assessment  - Modify environment to reduce risk of injury  - Provide assistive devices as appropriate  - Consider OT/PT consult to assist with strengthening/mobility  - Encourage toileting schedule  9/4/2024 0803 by Abdoulaye Marie RN  Outcome: Progressing  9/4/2024 0802 by Abdoulaye Marie RN  Outcome: Progressing     Problem: SKIN/TISSUE  INTEGRITY - ADULT  Goal: Skin integrity remains intact  Description: INTERVENTIONS  - Assess and document risk factors for pressure ulcer development  - Assess and document skin integrity  - Monitor for areas of redness and/or skin breakdown  - Initiate interventions, skin care algorithm/standards of care as needed  9/4/2024 0803 by Abdoulaye Marie RN  Outcome: Not Progressing  9/4/2024 0802 by Abdoulaye Marie RN  Outcome: Progressing  Goal: Incision(s), wounds(s) or drain site(s) healing without S/S of infection  Description: INTERVENTIONS:  - Assess and document risk factors for pressure ulcer development  - Assess and document skin integrity  - Assess and document dressing/incision, wound bed, drain sites and surrounding tissue  - Implement wound care per orders  - Initiate isolation precautions as appropriate  - Initiate Pressure Ulcer prevention bundle as indicated  9/4/2024 0803 by Abdoulaye Marie RN  Outcome: Not Progressing  9/4/2024 0802 by Abdoulaye Marie RN  Outcome: Progressing  Goal: Oral mucous membranes remain intact  Description: INTERVENTIONS  - Assess oral mucosa and hygiene practices  - Implement preventative oral hygiene regimen  - Implement oral medicated treatments as ordered  9/4/2024 0803 by Abdoulyae Marie RN  Outcome: Progressing  9/4/2024 0802 by Abdoulaye Marie RN  Outcome: Progressing     Problem: RESPIRATORY - ADULT  Goal: Achieves optimal ventilation and oxygenation  Description: INTERVENTIONS:  - Assess for changes in respiratory status  - Assess for changes in mentation and behavior  - Position to facilitate oxygenation and minimize respiratory effort  - Oxygen supplementation based on oxygen saturation or ABGs  - Provide Smoking Cessation handout, if applicable  - Encourage broncho-pulmonary hygiene including cough, deep breathe, Incentive Spirometry  - Assess the need for suctioning and perform as needed  - Assess and instruct to report SOB or any respiratory difficulty  -  Respiratory Therapy support as indicated  - Manage/alleviate anxiety  - Monitor for signs/symptoms of CO2 retention  Outcome: Not Progressing     Problem: CARDIOVASCULAR - ADULT  Goal: Maintains optimal cardiac output and hemodynamic stability  Description: INTERVENTIONS:  - Monitor vital signs, rhythm, and trends  - Monitor for bleeding, hypotension and signs of decreased cardiac output  - Evaluate effectiveness of vasoactive medications to optimize hemodynamic stability  - Monitor arterial and/or venous puncture sites for bleeding and/or hematoma  - Assess quality of pulses, skin color and temperature  - Assess for signs of decreased coronary artery perfusion - ex. Angina  - Evaluate fluid balance, assess for edema, trend weights  Outcome: Progressing  Goal: Absence of cardiac arrhythmias or at baseline  Description: INTERVENTIONS:  - Continuous cardiac monitoring, monitor vital signs, obtain 12 lead EKG if indicated  - Evaluate effectiveness of antiarrhythmic and heart rate control medications as ordered  - Initiate emergency measures for life threatening arrhythmias  - Monitor electrolytes and administer replacement therapy as ordered  Outcome: Progressing     Problem: METABOLIC/FLUID AND ELECTROLYTES - ADULT  Goal: Glucose maintained within prescribed range  Description: INTERVENTIONS:  - Monitor Blood Glucose as ordered  - Assess for signs and symptoms of hyperglycemia and hypoglycemia  - Administer ordered medications to maintain glucose within target range  - Assess barriers to adequate nutritional intake and initiate nutrition consult as needed  - Instruct patient on self management of diabetes  9/4/2024 0803 by Abdoulaye Marie, RN  Outcome: Not Progressing  9/4/2024 0802 by Abdoulaye Marie, RN  Outcome: Progressing  Goal: Electrolytes maintained within normal limits  Description: INTERVENTIONS:  - Monitor labs and rhythm and assess patient for signs and symptoms of electrolyte imbalances  - Administer  electrolyte replacement as ordered  - Monitor response to electrolyte replacements, including rhythm and repeat lab results as appropriate  - Fluid restriction as ordered  - Instruct patient on fluid and nutrition restrictions as appropriate  Outcome: Not Progressing  Goal: Hemodynamic stability and optimal renal function maintained  Description: INTERVENTIONS:  - Monitor labs and assess for signs and symptoms of volume excess or deficit  - Monitor intake, output and patient weight  - Monitor urine specific gravity, serum osmolarity and serum sodium as indicated or ordered  - Monitor response to interventions for patient's volume status, including labs, urine output, blood pressure (other measures as available)  - Encourage oral intake as appropriate  - Instruct patient on fluid and nutrition restrictions as appropriate  Outcome: Progressing     Problem: Delirium  Goal: Minimize duration of delirium  Description: Interventions:  - Encourage use of hearing aids, eye glasses  - Promote highest level of mobility daily  - Provide frequent reorientation  - Promote wakefulness i.e. lights on, blinds open  - Promote sleep, encourage patient's normal rest cycle i.e. lights off, TV off, minimize noise and interruptions  - Encourage family to assist in orientation and promotion of home routines  9/4/2024 0803 by Abdoulaye Marie RN  Outcome: Progressing  9/4/2024 0802 by Abdoulaye Marie RN  Outcome: Progressing     Problem: Diabetes/Glucose Control  Goal: Glucose maintained within prescribed range  Description: INTERVENTIONS:  - Monitor Blood Glucose as ordered  - Assess for signs and symptoms of hyperglycemia and hypoglycemia  - Administer ordered medications to maintain glucose within target range  - Assess barriers to adequate nutritional intake and initiate nutrition consult as needed  - Instruct patient on self management of diabetes  9/4/2024 0803 by Abdoulaye Marie, RN  Outcome: Not Progressing  9/4/2024 0802 by Frank  Abdoulaye MURO RN  Outcome: Progressing

## 2024-09-04 NOTE — PROGRESS NOTES
RRT called to room 441    On my arrival patient lying in bed minimally responsive opening eyes to touch however nonverbal at this time.  As per previous notes patient has had episodes of decreased responsiveness however currently tachypneic with increased work of breathing.    On exam  Temperature 99.2  Pulse 60  /100  Pulse ox 96% on 2 L nasal cannula  Somnolent obtunded  Chest clear bilaterally  Heart regular rate  Abdomen mildly distended  Extremities 2+ pitting edema  No focal neurological deficits.    Assessment and plan    Acute encephalopathy  Likely metabolic from presumed sepsis, will treat with fluids and antibiotics, monitor with neurochecks every 4 hours.    Possible sepsis  Antibiotics recently discontinued, will restart meropenem, along with vancomycin pharmacy to dose.  Cultures to be repeated.    Metabolic/lactic acidosis  Likely sepsis related, IV fluids initiated after initial amp of bicarb.  Continue IV fluids      [facility]  part of [facility]      Sepsis Reassessment Note    /68 (BP Location: Left arm)   Pulse 60   Temp 98.7 °F (37.1 °C) (Temporal)   Resp 18   Wt 143 lb 8.3 oz (65.1 kg)   SpO2 91%   BMI 24.64 kg/m²      I completed the sepsis reassessment at 0100    Cardiac:  Regularity: Regular  Rate: Normal  Heart Sounds: S1,S2    Lungs:   Right: Clear  Left: Clear    Peripheral Pulses:  Radial: Right 1+ or Left 1+      Capillary Refill:  <3 Secs    Skin:  Temp/Moisture: Warm and Dry  Color: Normal      ISAIAH GERMAN MD  9/4/2024  1:04 AM    35 minutes of critical care time spent with patient including coordinating care with ancillary staff, primary team notified.

## 2024-09-04 NOTE — PROGRESS NOTES
[facility] Pharmacy Dosing Service      Initial Pharmacokinetic Consult for Vancomycin Dosing     Margaret Silverio is a 79 year old female who is being initiated on vancomycin therapy for sepsis.  Pharmacy has been asked to dose vancomycin by Logan Harris MD .  The initial treatment and monitoring approach will be non-AUC strategy.        Weight and Temperature:    Wt Readings from Last 1 Encounters:   24 65.1 kg (143 lb 8.3 oz)        Temp Readings from Last 1 Encounters:   24 98.7 °F (37.1 °C) (Temporal)      Labs:   Recent Labs   Lab 24  0653 24  0630 24  2125   CREATSERUM 1.83* 1.89* 2.19*      Estimated Creatinine Clearance: 18.3 mL/min (A) (based on SCr of 2.19 mg/dL (H)).     Recent Labs   Lab 24  0804 24  0424 24  2125   WBC 19.3* 23.0* 33.6*          The Pharmacokinetic Target is:    Trough/random 10-15 mg/L    Renal Dosing Considerations:    GEORGE/ARF     Assessment/Plan:   Initial/Loading dose: Will receive 1000 mg IV (15 mg/kg, capped at 2250 mg) x 1 initial dose.      Maintenance dose: Pharmacy will dose vancomycin at 1000 mg IV every 24 hours    Monitorin) Plan for vancomycin trough to be obtained in approximately 72 hours    2) Pharmacy will order SCr as clinically indicated to assess renal function.    3) Pharmacy will monitor for toxicity and efficacy, adjust vancomycin dose and/or frequency, and order vancomycin levels as appropriate per the Pharmacy and Therapeutics Committee approved protocol until discontinuation of the medication.       We appreciate the opportunity to assist in the care of this patient.     Mary Brar, PharmD  9/3/2024  11:52 PM  [facility] Extension: [phone]

## 2024-09-04 NOTE — PROGRESS NOTES
[facility]  Nephrology Daily Progress Note    Margaret Silverio  [id number]  79 year old      HPI:   Margaret Silverio is a 79 year old female.  Transferred to ICU for increasing lethargy.  Currently very weak but talking a little and says she wants water.  On 4 L. Just back from CT of head.  Results pending.       ROS:     Constitutional:  Negative for decreased activity, fever, irritability and lethargy  Endocrine:  Negative for abnormal sleep patterns, increased activity, polydipsia and polyphagia  Cardiovascular:  Negative for cool extremity and irregular heartbeat/palpitations  Gastrointestinal:  Negative for abdominal pain, constipation, decreased appetite, diarrhea and vomiting  Genitourinary:  Negative for dysuria and hematuria  Hema/Lymph:  Negative for easy bleeding and easy bruising  Integumentary:  Negative for pruritus and rash  Musculoskeletal:  Negative for bone/joint symptoms  Neurological:  Negative for gait disturbance  Psychiatric:  Negative for inappropriate interaction and psychiatric symptoms  Respiratory:  Negative for cough, dyspnea and wheezing      PHYSICAL EXAM:   Temp:  [98.1 °F (36.7 °C)-99.2 °F (37.3 °C)] 98.1 °F (36.7 °C)  Pulse:  [59-77] 60  Resp:  [15-32] 32  BP: (118-151)/() 134/76  SpO2:  [82 %-100 %] 96 %  Patient Weight for the past 72 hrs:   Weight   09/02/24 0602 152 lb (68.9 kg)   09/02/24 0900 150 lb (68 kg)   09/03/24 2138 143 lb 8.3 oz (65.1 kg)       Constitutional: appears well hydrated alert and responsive no acute distress noted  Neck/Thyroid: neck is supple without adenopathy  Lymphatic: no abnormal cervical, supraclavicular or axillary adenopathy is noted  Respiratory: normal to inspection lungs are clear to auscultation bilaterally normal respiratory effort  Cardiovascular: regular rate and rhythm no murmurs, gallups, or rubs  Abdomen: soft non-tender non-distended no organomegaly noted no masses  Musculoskeletal: full ROM all extremities  good strength  no deformities  Extremities: 1-2 + edema.    Neurological: exam appropriate for age reflexes and motor skills appropriate for age    Labs:  Lab Results   Component Value Date    WBC 35.7 09/04/2024    HGB 7.8 09/04/2024    HCT 24.3 09/04/2024    .0 09/04/2024    CREATSERUM 1.86 09/04/2024    BUN 35 09/04/2024     09/04/2024    K 4.8 09/04/2024     09/04/2024    CO2 12.0 09/04/2024     09/04/2024    CA 7.6 09/04/2024    ALB 2.8 09/04/2024    ALKPHO 212 09/04/2024    BILT 1.0 09/04/2024    TP 6.0 09/04/2024    AST 94 09/04/2024    ALT 33 09/04/2024    PTT 39.2 09/04/2024    INR 1.72 09/04/2024    MG 2.5 09/04/2024    MG 2.6 09/04/2024    PHOS 5.7 09/04/2024     Recent Labs   Lab 09/03/24  0424 09/03/24 2125 09/04/24  0507   WBC 23.0* 33.6* 35.7*   HGB 8.0* 8.8* 7.8*   HCT 25.4* 30.1* 24.3*   .0 230.0 203.0     Recent Labs   Lab 09/02/24  0653 09/03/24  0630 09/03/24 2125 09/04/24  0507 09/04/24  0943   GLU 97   < > 105* 65* 266*   BUN 33*   < > 36* 38* 35*   CREATSERUM 1.83*   < > 2.19* 2.06* 1.86*   CA 8.8   < > 9.3 8.5* 7.6*   ALB 3.0*  --  3.4 2.9* 2.8*      < > 143 145 147*   K 5.0   < > 4.9 4.8 4.8   *   < > 117* 118* 122*   CO2 18.0*   < > 11.0* 11.0* 12.0*   ALKPHO  --   --  283* 231* 212*   AST  --   --  87* 92* 94*   ALT  --   --  33 35 33   BILT  --   --  1.0 1.1 1.0   TP  --   --  7.4 6.5 6.0   PHOS 4.7  --  5.7* 5.7*  --     < > = values in this interval not displayed.       Imaging  XR CHEST AP PORTABLE  (CPT=71045)    Result Date: 9/3/2024  CONCLUSION:  1. Moderate cardiomegaly prominent central vasculature. 2. Bilateral perihilar and lower lobe parenchymal abnormality with moderate pleural effusions.  Slight improved aeration in the upper lobes.    Dictated by (CST): Justin Stark MD on 9/03/2024 at 10:59 AM     Finalized by (CST): Justin Stark MD on 9/03/2024 at 11:02 AM             Medications:    Current Facility-Administered  Medications:     dextrose 5%-sodium chloride 0.9% infusion, , Intravenous, Continuous    pantoprazole (Protonix) 40 mg in sodium chloride 0.9% PF 10 mL IV push, 40 mg, Intravenous, Daily    vancomycin (Vancocin) 1,000 mg in sodium chloride 0.9% 250 mL IVPB-ADDV, 15 mg/kg, Intravenous, Q24H    meropenem (Merrem) 500 mg in sodium chloride 0.9% 100 mL IVPB-MBP, 500 mg, Intravenous, q12h    loperamide (Imodium) cap 2 mg, 2 mg, Oral, QID PRN    HYDROcodone-acetaminophen (Norco)  MG per tab 2 tablet, 2 tablet, Oral, Q4H PRN    vancomycin (Vancocin) cap 125 mg, 125 mg, Oral, q12h    HYDROcodone-acetaminophen (Norco)  MG per tab 1 tablet, 1 tablet, Oral, Q4H PRN    [Held by provider] levETIRAcetam (Keppra) 500 mg/5mL injection 500 mg, 500 mg, Intravenous, Daily    heparin (Porcine) 5000 UNIT/ML injection 5,000 Units, 5,000 Units, Subcutaneous, Q8H BROOKLYNN    HYDROmorphone (Dilaudid) 1 MG/ML injection 0.2 mg, 0.2 mg, Intravenous, Q4H PRN **OR** [DISCONTINUED] HYDROmorphone (Dilaudid) 1 MG/ML injection 0.4 mg, 0.4 mg, Intravenous, Q3H PRN **OR** [DISCONTINUED] HYDROmorphone (Dilaudid) 1 MG/ML injection 0.8 mg, 0.8 mg, Intravenous, Q3H PRN    acetaminophen (Tylenol) 160 MG/5ML oral liquid 650 mg, 650 mg, Oral, Q8H    senna-docusate (Senokot-S) 8.6-50 MG per tab 2 tablet, 2 tablet, Oral, Daily PRN    polyethylene glycol (PEG 3350) (Miralax) 17 g oral packet 17 g, 17 g, Oral, Daily PRN    glucose (Dex4) 15 GM/59ML oral liquid 15 g, 15 g, Oral, Q15 Min PRN **OR** glucose (Glutose) 40% oral gel 15 g, 15 g, Oral, Q15 Min PRN **OR** glucose-vitamin C (Dex-4) chewable tab 4 tablet, 4 tablet, Oral, Q15 Min PRN **OR** dextrose 50% injection 50 mL, 50 mL, Intravenous, Q15 Min PRN **OR** glucose (Dex4) 15 GM/59ML oral liquid 30 g, 30 g, Oral, Q15 Min PRN **OR** glucose (Glutose) 40% oral gel 30 g, 30 g, Oral, Q15 Min PRN **OR** glucose-vitamin C (Dex-4) chewable tab 8 tablet, 8 tablet, Oral, Q15 Min PRN    traMADol (Ultram) tab  50 mg, 50 mg, Oral, Q12H PRN    sacubitril-valsartan (Entresto) 24-26 MG per tab 1 tablet, 1 tablet, Oral, BID    metoprolol succinate ER (Toprol XL) 24 hr tab 25 mg, 25 mg, Oral, Daily Beta Blocker    [Held by provider] midodrine (ProAmatine) tab 5 mg, 5 mg, Oral, TID    ondansetron (Zofran) 4 MG/2ML injection 4 mg, 4 mg, Intravenous, Q6H PRN    metoclopramide (Reglan) 5 mg/mL injection 5 mg, 5 mg, Intravenous, Q8H PRN    sodium hypochlorite (Dakin's) 0.125 % external solution, , Topical, BID    acetaminophen (Tylenol Extra Strength) tab 500 mg, 500 mg, Oral, Q4H PRN    amiodarone (Pacerone) tab 200 mg, 200 mg, Oral, Daily    ferrous sulfate DR tab 325 mg, 325 mg, Oral, Daily with breakfast    folic acid (Folvite) tab 800 mcg, 800 mcg, Oral, Daily    gabapentin (Neurontin) cap 300 mg, 300 mg, Oral, TID    [Held by provider] isosorbide dinitrate (Isordil) tab 20 mg, 20 mg, Oral, TID (Nitrates)    Allergies:  Allergies   Allergen Reactions    Lisinopril Coughing       Input/Output:    Intake/Output Summary (Last 24 hours) at 9/4/2024 1115  Last data filed at 9/4/2024 0700  Gross per 24 hour   Intake 2303 ml   Output 480 ml   Net 1823 ml          ASSESSMENT/PLAN:   Assessment   Patient Active Problem List   Diagnosis    Altered mental status    Altered mental status, unspecified altered mental status type    Seizure (HCC)    Lactic acidosis    Leukocytosis    Acute GI bleeding    Thrombocytopenia (HCC)    Metabolic acidosis    Atrial fibrillation, chronic (HCC)    Sepsis (HCC)    Sepsis due to Escherichia coli (HCC)    ABLA (acute blood loss anemia)    Melena    Acute chest pain    Pleural effusion    ACC/AHA stage B systolic heart failure (HCC)    Congestive heart failure (HCC)    Coronary arteriosclerosis    Essential (primary) hypertension    Mitral valve stenosis    Rheumatoid arthritis (HCC)    Stage 3 chronic kidney disease (HCC)    Atrial flutter (HCC)    Acute on chronic HFrEF (heart failure with reduced  ejection fraction) (HCC)    Dyspnea, unspecified type    Anticoagulated    Coagulopathy (HCC)    SIRS (systemic inflammatory response syndrome) (HCC)    Anemia due to stage 3 chronic kidney disease (HCC)    Anemia due to GI blood loss    Anemia of chronic disease    Lymphopenia    Pancytopenia (HCC)    Acute deep vein thrombosis (DVT) of left upper extremity (HCC)    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HCC)    Cardiogenic shock (HCC)    HFrEF (heart failure with reduced ejection fraction) ([facility])    Anemia    Acute kidney injury (HCC)    Iron deficiency anemia, unspecified iron deficiency anemia type    Weakness generalized    Acute kidney insufficiency    Hyperkalemia    Cervical pain    Cervical radiculopathy    Pleural effusion, bilateral    Cognitive communication deficit    Difficulty in walking, not elsewhere classified    Dysphagia, oral phase    Gastro-esophageal reflux disease without esophagitis    Cardiomyopathy, unspecified ([facility])    Major depressive disorder, single episode, unspecified    Severe tricuspid regurgitation    S/P MVR (mitral valve replacement)    PAF (paroxysmal atrial fibrillation) ([facility])    ICD (implantable cardioverter-defibrillator), dual, in situ    CKD (chronic kidney disease) stage 4, GFR 15-29 ml/min ([facility])    Pressure injury of skin of sacral region, unspecified injury stage    Urinary tract infection without hematuria, site unspecified    Anemia, unspecified type    Encephalopathy    Goals of care, counseling/discussion    Palliative care encounter    Pain       Back on IVF for hypoglycemia.  Will try and decrease rate as vol status up.  Switch to bicarb drip and increase free water for hypernatremia.  Hooks for more accurate I/Os. Creatinine better 1.86 but now with significant metabolic acidosis.  Lactate up and WBC up to 35.7. Condition guarded.  DNR/select.  Discussed with RN.              9/4/2024  Eyad Arvizu MD

## 2024-09-05 ENCOUNTER — APPOINTMENT (OUTPATIENT)
Dept: GENERAL RADIOLOGY | Facility: HOSPITAL | Age: 79
DRG: 853 | End: 2024-09-05
Attending: INTERNAL MEDICINE
Payer: MEDICARE

## 2024-09-05 LAB
ALBUMIN SERPL-MCNC: 2.7 G/DL (ref 3.2–4.8)
ALBUMIN/GLOB SERPL: 0.8 {RATIO} (ref 1–2)
ALP LIVER SERPL-CCNC: 238 U/L
ALT SERPL-CCNC: 67 U/L
ANION GAP SERPL CALC-SCNC: 9 MMOL/L (ref 0–18)
AST SERPL-CCNC: 174 U/L (ref ?–34)
BASOPHILS # BLD: 0.29 X10(3) UL (ref 0–0.2)
BASOPHILS NFR BLD: 1 %
BILIRUB SERPL-MCNC: 0.9 MG/DL (ref 0.2–1.1)
BUN BLD-MCNC: 39 MG/DL (ref 9–23)
BUN/CREAT SERPL: 22.9 (ref 10–20)
CALCIUM BLD-MCNC: 8.4 MG/DL (ref 8.7–10.4)
CHLORIDE SERPL-SCNC: 116 MMOL/L (ref 98–112)
CO2 SERPL-SCNC: 19 MMOL/L (ref 21–32)
CREAT BLD-MCNC: 1.7 MG/DL
DEPRECATED RDW RBC AUTO: 71.4 FL (ref 35.1–46.3)
EGFRCR SERPLBLD CKD-EPI 2021: 31 ML/MIN/1.73M2 (ref 60–?)
EOSINOPHIL # BLD: 0 X10(3) UL (ref 0–0.7)
EOSINOPHIL NFR BLD: 0 %
ERYTHROCYTE [DISTWIDTH] IN BLOOD BY AUTOMATED COUNT: 22.5 % (ref 11–15)
GLOBULIN PLAS-MCNC: 3.4 G/DL (ref 2–3.5)
GLUCOSE BLD-MCNC: 102 MG/DL (ref 70–99)
GLUCOSE BLDC GLUCOMTR-MCNC: 61 MG/DL (ref 70–99)
GLUCOSE BLDC GLUCOMTR-MCNC: 68 MG/DL (ref 70–99)
GLUCOSE BLDC GLUCOMTR-MCNC: 70 MG/DL (ref 70–99)
GLUCOSE BLDC GLUCOMTR-MCNC: 70 MG/DL (ref 70–99)
GLUCOSE BLDC GLUCOMTR-MCNC: 92 MG/DL (ref 70–99)
HCT VFR BLD AUTO: 26.7 %
HGB BLD-MCNC: 8.4 G/DL
LYMPHOCYTES NFR BLD: 0.57 X10(3) UL (ref 1–4)
LYMPHOCYTES NFR BLD: 2 %
MAGNESIUM SERPL-MCNC: 1.9 MG/DL (ref 1.6–2.6)
MCH RBC QN AUTO: 30.4 PG (ref 26–34)
MCHC RBC AUTO-ENTMCNC: 31.5 G/DL (ref 31–37)
MCV RBC AUTO: 96.7 FL
METAMYELOCYTES # BLD: 0.29 X10(3) UL
METAMYELOCYTES NFR BLD: 1 %
MONOCYTES # BLD: 1.44 X10(3) UL (ref 0.1–1)
MONOCYTES NFR BLD: 5 %
NEUTROPHILS # BLD AUTO: 23.31 X10 (3) UL (ref 1.5–7.7)
NEUTROPHILS NFR BLD: 90 %
NEUTS BAND NFR BLD: 1 %
NEUTS HYPERSEG # BLD: 26.12 X10(3) UL (ref 1.5–7.7)
NRBC BLD MANUAL-RTO: 6 %
OSMOLALITY SERPL CALC.SUM OF ELEC: 308 MOSM/KG (ref 275–295)
PHOSPHATE SERPL-MCNC: 3.8 MG/DL (ref 2.4–5.1)
PLATELET # BLD AUTO: 210 10(3)UL (ref 150–450)
PLATELET MORPHOLOGY: NORMAL
POTASSIUM SERPL-SCNC: 3.7 MMOL/L (ref 3.5–5.1)
PROT SERPL-MCNC: 6.1 G/DL (ref 5.7–8.2)
RBC # BLD AUTO: 2.76 X10(6)UL
SODIUM SERPL-SCNC: 144 MMOL/L (ref 136–145)
TOTAL CELLS COUNTED BLD: 100
WBC # BLD AUTO: 28.7 X10(3) UL (ref 4–11)

## 2024-09-05 PROCEDURE — B54MZZA ULTRASONOGRAPHY OF RIGHT UPPER EXTREMITY VEINS, GUIDANCE: ICD-10-PCS | Performed by: HOSPITALIST

## 2024-09-05 PROCEDURE — 71045 X-RAY EXAM CHEST 1 VIEW: CPT | Performed by: INTERNAL MEDICINE

## 2024-09-05 PROCEDURE — 99233 SBSQ HOSP IP/OBS HIGH 50: CPT | Performed by: INTERNAL MEDICINE

## 2024-09-05 PROCEDURE — 99231 SBSQ HOSP IP/OBS SF/LOW 25: CPT | Performed by: NURSE PRACTITIONER

## 2024-09-05 PROCEDURE — 05HB33Z INSERTION OF INFUSION DEVICE INTO RIGHT BASILIC VEIN, PERCUTANEOUS APPROACH: ICD-10-PCS | Performed by: HOSPITALIST

## 2024-09-05 RX ORDER — POTASSIUM CHLORIDE 1.5 G/1.58G
20 POWDER, FOR SOLUTION ORAL 2 TIMES DAILY
Status: DISCONTINUED | OUTPATIENT
Start: 2024-09-05 | End: 2024-09-10

## 2024-09-05 RX ORDER — DEXTROSE MONOHYDRATE 50 MG/ML
INJECTION, SOLUTION INTRAVENOUS CONTINUOUS
Status: DISCONTINUED | OUTPATIENT
Start: 2024-09-05 | End: 2024-09-05

## 2024-09-05 RX ORDER — POTASSIUM CHLORIDE 1500 MG/1
20 TABLET, EXTENDED RELEASE ORAL ONCE
Status: DISCONTINUED | OUTPATIENT
Start: 2024-09-05 | End: 2024-09-05

## 2024-09-05 RX ORDER — SODIUM CHLORIDE 9 MG/ML
INJECTION, SOLUTION INTRAVENOUS CONTINUOUS
Status: DISCONTINUED | OUTPATIENT
Start: 2024-09-05 | End: 2024-09-06

## 2024-09-05 NOTE — PROGRESS NOTES
[facility]  Nephrology Daily Progress Note    Margaret Silverio  [id number]  79 year old      HPI:   Margaret Silverio is a 79 year old female.  More awake and alert today.  Remains very weak but communicating.  Voices no complaints.       ROS:     Constitutional:  Weak.   Endocrine:  Negative for abnormal sleep patterns, increased activity, polydipsia and polyphagia  Cardiovascular:  Negative for cool extremity and irregular heartbeat/palpitations  Gastrointestinal:  Negative for abdominal pain, constipation, decreased appetite, diarrhea and vomiting  Genitourinary:  Negative for dysuria and hematuria  Hema/Lymph:  Negative for easy bleeding and easy bruising  Integumentary:  Negative for pruritus and rash  Musculoskeletal:  Negative for bone/joint symptoms  Neurological:  Negative for gait disturbance  Psychiatric:  Negative for inappropriate interaction and psychiatric symptoms  Respiratory:  Negative for cough, dyspnea and wheezing      PHYSICAL EXAM:   Temp:  [96.9 °F (36.1 °C)-97.6 °F (36.4 °C)] 97 °F (36.1 °C)  Pulse:  [59-60] 59  Resp:  [10-21] 10  BP: (109-128)/(57-81) 118/81  SpO2:  [92 %-100 %] 99 %  Patient Weight for the past 72 hrs:   Weight   09/03/24 2138 143 lb 8.3 oz (65.1 kg)   09/04/24 0800 149 lb 14.6 oz (68 kg)       Constitutional: appears well hydrated alert and responsive no acute distress noted  Neck/Thyroid: neck is supple without adenopathy  Lymphatic: no abnormal cervical, supraclavicular or axillary adenopathy is noted  Respiratory: normal to inspection lungs are clear to auscultation bilaterally normal respiratory effort  Cardiovascular: regular rate and rhythm no murmurs, gallups, or rubs  Abdomen: soft non-tender non-distended no organomegaly noted no masses  Musculoskeletal: full ROM all extremities good strength  no deformities  Extremities: no edema, cyanosis, or clubbing  Neurological: exam appropriate for age reflexes and motor skills appropriate for  age    Labs:  Lab Results   Component Value Date    WBC 28.7 09/05/2024    HGB 8.4 09/05/2024    HCT 26.7 09/05/2024    .0 09/05/2024    CREATSERUM 1.70 09/05/2024    BUN 39 09/05/2024     09/05/2024    K 3.7 09/05/2024     09/05/2024    CO2 19.0 09/05/2024     09/05/2024    CA 8.4 09/05/2024    ALB 2.7 09/05/2024    ALKPHO 238 09/05/2024    BILT 0.9 09/05/2024    TP 6.1 09/05/2024     09/05/2024    ALT 67 09/05/2024    MG 1.9 09/05/2024    PHOS 3.8 09/05/2024     Recent Labs   Lab 09/03/24 2125 09/04/24  0507 09/05/24  1116   WBC 33.6* 35.7* 28.7*   HGB 8.8* 7.8* 8.4*   HCT 30.1* 24.3* 26.7*   .0 203.0 210.0     Recent Labs   Lab 09/03/24 2125 09/04/24  0507 09/04/24  0943 09/05/24  1116   * 65* 266* 102*   BUN 36* 38* 35* 39*   CREATSERUM 2.19* 2.06* 1.86* 1.70*   CA 9.3 8.5* 7.6* 8.4*   ALB 3.4 2.9* 2.8* 2.7*    145 147* 144   K 4.9 4.8 4.8 3.7   * 118* 122* 116*   CO2 11.0* 11.0* 12.0* 19.0*   ALKPHO 283* 231* 212* 238*   AST 87* 92* 94* 174*   ALT 33 35 33 67*   BILT 1.0 1.1 1.0 0.9   TP 7.4 6.5 6.0 6.1   PHOS 5.7* 5.7*  --  3.8       Imaging  XR CHEST AP PORTABLE  (CPT=71045)    Result Date: 9/5/2024  CONCLUSION:   No significant change from 09/03/2024.  Moderate cardiomegaly with interstitial pulmonary edema.  Bilateral perihilar and lower lobe alveolar opacity, likely alveolar edema with other superimposed infiltrate less likely.  Unchanged small left greater than right pleural effusions.  Again, right effusion may be loculated       Dictated by (CST): Nadine Blankenship MD on 9/05/2024 at 7:20 AM     Finalized by (CST): Nadine Blankenship MD on 9/05/2024 at 7:22 AM          CT ABDOMEN+PELVIS(CPT=74176)    Result Date: 9/4/2024  CONCLUSION:  1. Limited non-contrast examination, which is further confounded by patient positioning.  2. Long segment/diffuse colonic wall thickening with pericolonic inflammatory stranding.  Findings raise suspicion for  infectious or inflammatory colitis.  Please ensure that the patient has recent Clostridium difficile testing.  No definite free intraperitoneal air, well-defined/drainable intra-abdominal collection, pneumatosis intestinalis, or portal venous gas.  No evidence of bowel obstruction. 3. Large sacral decubitus ulcer again identified.  There is edema and phlegmon surrounding the ulcer, but no well-defined/drainable collection to suggest abscess by noncontrast CT.  No definite CT findings of osteomyelitis at the adjacent sacrum or coccyx. 4. Congestive failure and/or fluid overload with loculated moderate left and small to moderate right pleural effusions, severe cardiomegaly, small volume intra-abdominal ascites, and severe anasarca. 5. Coronary and peripheral atherosclerosis.  Partially imaged AICD electrodes as well as mitral valve repair. 6. Aforementioned loculated right pleural effusion demonstrates mild peripheral thickening; superinfection/empyema cannot be excluded.  Dependent airspace disease in the left greater than right lower lobes is favored to represent compressive atelectasis,  but coexistent infection/pneumonia should be excluded on the basis of clinical parameters. 7. Sludge or vicarious excretion of contrast in the gallbladder.  No evidence of acute cholecystitis. 8. Stable morphologic changes of chronic pancreatitis.  9. Lesser incidental findings as above.   Dictated by (CST): Aleks Diana MD on 9/04/2024 at 2:34 PM     Finalized by (CST): Aleks Diana MD on 9/04/2024 at 2:48 PM          CT BRAIN OR HEAD (CPT=70450)    Result Date: 9/4/2024  CONCLUSION:  1. No acute intracranial process by noncontrast CT technique.  2. Senescent changes of parenchymal volume loss with sequela of chronic microvascular ischemic disease. There is also large vessel atherosclerosis.   3. An empty sella is seen with an expansile appearance of the sella turcica, which may reflect normal variation or could be sequela  of benign idiopathic intracranial hypertension.  4. Lesser incidental findings as above.   Dictated by (CST): Corey Posey MD on 9/04/2024 at 11:19 AM     Finalized by (CST): Corey Posey MD on 9/04/2024 at 11:22 AM          XR CHEST AP PORTABLE  (CPT=71045)    Result Date: 9/3/2024  CONCLUSION:  1. Moderate cardiomegaly prominent central vasculature. 2. Bilateral perihilar and lower lobe parenchymal abnormality with moderate pleural effusions.  Slight improved aeration in the upper lobes.    Dictated by (CST): Justin Stark MD on 9/03/2024 at 10:59 AM     Finalized by (CST): Justin Stark MD on 9/03/2024 at 11:02 AM             Medications:    Current Facility-Administered Medications:     pantoprazole (Protonix) 40 mg in sodium chloride 0.9% PF 10 mL IV push, 40 mg, Intravenous, Daily    bumetanide (Bumex) 0.25 MG/ML injection 1 mg, 1 mg, Intravenous, Daily    sodium bicarbonate 100 mEq in dextrose 5% 1,100 mL infusion, 100 mEq, Intravenous, Continuous    sodium ferric gluconate (Ferrlecit) 125 mg in sodium chloride 0.9% 100mL IVPB premix, 125 mg, Intravenous, Daily    vancomycin (Vancocin) 1,000 mg in sodium chloride 0.9% 250 mL IVPB-ADDV, 15 mg/kg, Intravenous, Q24H    meropenem (Merrem) 500 mg in sodium chloride 0.9% 100 mL IVPB-MBP, 500 mg, Intravenous, q12h    loperamide (Imodium) cap 2 mg, 2 mg, Oral, QID PRN    HYDROcodone-acetaminophen (Norco)  MG per tab 2 tablet, 2 tablet, Oral, Q4H PRN    vancomycin (Vancocin) cap 125 mg, 125 mg, Oral, q12h    HYDROcodone-acetaminophen (Norco)  MG per tab 1 tablet, 1 tablet, Oral, Q4H PRN    [Held by provider] levETIRAcetam (Keppra) 500 mg/5mL injection 500 mg, 500 mg, Intravenous, Daily    heparin (Porcine) 5000 UNIT/ML injection 5,000 Units, 5,000 Units, Subcutaneous, Q8H BR[place]NN    HYDROmorphone (Dilaudid) 1 MG/ML injection 0.2 mg, 0.2 mg, Intravenous, Q4H PRN **OR** [DISCONTINUED] HYDROmorphone (Dilaudid) 1 MG/ML injection 0.4 mg, 0.4 mg,  Intravenous, Q3H PRN **OR** [DISCONTINUED] HYDROmorphone (Dilaudid) 1 MG/ML injection 0.8 mg, 0.8 mg, Intravenous, Q3H PRN    acetaminophen (Tylenol) 160 MG/5ML oral liquid 650 mg, 650 mg, Oral, Q8H    senna-docusate (Senokot-S) 8.6-50 MG per tab 2 tablet, 2 tablet, Oral, Daily PRN    polyethylene glycol (PEG 3350) (Miralax) 17 g oral packet 17 g, 17 g, Oral, Daily PRN    glucose (Dex4) 15 GM/59ML oral liquid 15 g, 15 g, Oral, Q15 Min PRN **OR** glucose (Glutose) 40% oral gel 15 g, 15 g, Oral, Q15 Min PRN **OR** glucose-vitamin C (Dex-4) chewable tab 4 tablet, 4 tablet, Oral, Q15 Min PRN **OR** dextrose 50% injection 50 mL, 50 mL, Intravenous, Q15 Min PRN **OR** glucose (Dex4) 15 GM/59ML oral liquid 30 g, 30 g, Oral, Q15 Min PRN **OR** glucose (Glutose) 40% oral gel 30 g, 30 g, Oral, Q15 Min PRN **OR** glucose-vitamin C (Dex-4) chewable tab 8 tablet, 8 tablet, Oral, Q15 Min PRN    traMADol (Ultram) tab 50 mg, 50 mg, Oral, Q12H PRN    sacubitril-valsartan (Entresto) 24-26 MG per tab 1 tablet, 1 tablet, Oral, BID    metoprolol succinate ER (Toprol XL) 24 hr tab 25 mg, 25 mg, Oral, Daily Beta Blocker    [Held by provider] midodrine (ProAmatine) tab 5 mg, 5 mg, Oral, TID    ondansetron (Zofran) 4 MG/2ML injection 4 mg, 4 mg, Intravenous, Q6H PRN    metoclopramide (Reglan) 5 mg/mL injection 5 mg, 5 mg, Intravenous, Q8H PRN    sodium hypochlorite (Dakin's) 0.125 % external solution, , Topical, BID    acetaminophen (Tylenol Extra Strength) tab 500 mg, 500 mg, Oral, Q4H PRN    amiodarone (Pacerone) tab 200 mg, 200 mg, Oral, Daily    folic acid (Folvite) tab 800 mcg, 800 mcg, Oral, Daily    gabapentin (Neurontin) cap 300 mg, 300 mg, Oral, TID    [Held by provider] isosorbide dinitrate (Isordil) tab 20 mg, 20 mg, Oral, TID (Nitrates)    Allergies:  Allergies   Allergen Reactions    Lisinopril Coughing       Input/Output:    Intake/Output Summary (Last 24 hours) at 9/5/2024 1207  Last data filed at 9/5/2024 0600  Gross per 24  hour   Intake 1826.5 ml   Output 910 ml   Net 916.5 ml          ASSESSMENT/PLAN:   Assessment   Patient Active Problem List   Diagnosis    Altered mental status    Altered mental status, unspecified altered mental status type    Seizure (HCC)    Lactic acidosis    Leukocytosis    Acute GI bleeding    GEORGE (acute kidney injury) (HCC)    Thrombocytopenia (HCC)    Metabolic acidosis    Atrial fibrillation, chronic (HCC)    Sepsis (HCC)    Sepsis due to Escherichia coli (HCC)    ABLA (acute blood loss anemia)    Melena    Acute chest pain    Pleural effusion    ACC/AHA stage B systolic heart failure (HCC)    Congestive heart failure (HCC)    Coronary arteriosclerosis    Essential (primary) hypertension    Mitral valve stenosis    Rheumatoid arthritis (HCC)    Stage 3 chronic kidney disease (HCC)    Atrial flutter (HCC)    Acute on chronic HFrEF (heart failure with reduced ejection fraction) (HCC)    Dyspnea, unspecified type    Anticoagulated    Coagulopathy (HCC)    SIRS (systemic inflammatory response syndrome) (HCC)    Anemia due to stage 3 chronic kidney disease (HCC)    Anemia due to GI blood loss    Anemia of chronic disease    Lymphopenia    Pancytopenia (HCC)    Acute deep vein thrombosis (DVT) of left upper extremity (HCC)    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HCC)    Cardiogenic shock (HCC)    HFrEF (heart failure with reduced ejection fraction) (HCC)    Anemia    Acute kidney injury (HCC)    Iron deficiency anemia, unspecified iron deficiency anemia type    Weakness generalized    Acute kidney insufficiency    Hyperkalemia    Cervical pain    Cervical radiculopathy    Pleural effusion, bilateral    Cognitive communication deficit    Difficulty in walking, not elsewhere classified    Dysphagia, oral phase    Gastro-esophageal reflux disease without esophagitis    Cardiomyopathy, unspecified (HCC)    Major depressive disorder, single episode, unspecified    Severe tricuspid regurgitation    S/P MVR (mitral valve  replacement)    PAF (paroxysmal atrial fibrillation) ([facility])    ICD (implantable cardioverter-defibrillator), dual, in situ    CKD (chronic kidney disease) stage 4, GFR 15-29 ml/min ([facility])    Pressure injury of skin of sacral region, unspecified injury stage    Urinary tract infection without hematuria, site unspecified    Anemia, unspecified type    Encephalopathy    Goals of care, counseling/discussion    Palliative care encounter    Pain       UO improving 910 ml.  Creatinine better 1.70 and CO2 better at 19.  Bld sugar still dropping off and on.  Continue gentle hydration with dextrose and bicarb.  WBC better 28.7.  Hb 8.4.  Discussed with RN.              9/5/2024  Eyad Arvizu MD

## 2024-09-05 NOTE — PROGRESS NOTES
[facility] ID PROGRESS NOTE    Margaret Silverio Patient Status:  Inpatient    3/14/1945 MRN [id number]   Location [facility] 2W/SW Attending Veto Zhang MD   Hosp Day # 24 PCP Johnathon Rodriguez, DO     SUBJECTIVE  ROS limited. More awake today.  On 2L HFNC. One BM yesterday.    ASSESSMENT:     Antibiotics: Meropenem, vancomycin, OVP (IV ceftriaxone -     # Acute sepsis with lactic acidosis with hypoxia   -CT A/P with infectious/inflammatory colitis ?ischemic, loculated R pleural effusion  # GEORGE  # Staph epidermidis bacteremia in 1/2 sets on 24 - likely contaminant               -Has bioprosthetic MVR and L chest PPM   -TTE without vegetation  # Leukocytosis - suspect reactive; r/o bacteremia; repeat cx NGTD  -Also got 1 dose of solumedrol  # GEORGE on CKD  # Anemia s/p transfusion      # Sacral wound stage 2 s/p OR debridement 24 - cx E.coli, anaerobes - currently no signs of infection     # Asymptomatic bacteruria - E. coli  # Intermittent AMS    # RA, bedbound, previously on MTX and prednisone               - received prednisone at last discharge; currently off related to limited PO     PLAN:  -  Continue on vancomycin. Stop meropenem.  -  Monitor R pleural effusion.   -  Follow fever curve, wbc.   -  Reviewed labs, micro, imaging reports.  -  Case d/w patient, RN.     History of Present Illness:  79-year-old female with a history of severe nonischemic cardiomyopathy with multiple admissions for heart failure, A-fib, CKD, severe RA with multiple joints involved was recently discharged about 1 month prior.  During that admission had a large left pleural effusion on dobutamine, Bumex, thoracentesis, left and right heart cath showing normal coronaries but severe volume overload.  Also had GEORGE and leukopenia.  Now admitted on  with painful decubitus ulcer.  Patient is bedbound with severe RA on methotrexate and prednisone.  Afebrile, 2 L nasal  cannula.  Seen by general surgery and taken the OR on 8/13 status post excision of cyst and sharp debridement of the decubitus ulcer.  Cultures with E. coli and anaerobes.  IV ceftriaxone for sacral wound and UTI completed 8/21.  Now with fever of 100.2 on 8/22 with increased WBC.  Blood culture sent with staph not aureus in 1 out of 2 sets.  Chest x-ray with no new focal opacity with persistent edema noted.  Goals of care discussion ongoing related to poor appetite, pain control.     OBJECTIVE  /70 (BP Location: Left arm)   Pulse 60   Temp 96.9 °F (36.1 °C) (Temporal)   Resp 16   Wt 149 lb 14.6 oz (68 kg)   SpO2 100%   BMI 25.73 kg/m²     Gen:   Awake, in bed  HEENT:  EOMI, neck supple  CV/lungs:  RRR, lungs CTAB  Abdom:  Soft, mild periumbilical TTP  Skin/extrem:  No rashes, no c/c/e, hand contractures noted  :   Purewick+  Lines:  PIV+    Laboratory Data: Reviewed     Microbiology: Reviewed     Radiology: Reviewed    [facility]  [phone]

## 2024-09-05 NOTE — PROGRESS NOTES
Daily Pulmonary/ICU/Critical Care Progress Note          SUBJECTIVE:  Transferred to ICU for metabolic acidosis and encephalopathy and decreased responsiveness.  On bicarb infusion.  More awake this am and not complaining of anything.         ALLERGIES:  Allergies   Allergen Reactions    Lisinopril Coughing         MEDS:  Home Medications:  Outpatient Medications Marked as Taking for the 8/12/24 encounter (Hospital Encounter)   Medication Sig Dispense Refill    bumetanide 1 MG Oral Tab Take 1 tablet (1 mg total) by mouth daily. 30 tablet 2    carvedilol 6.25 MG Oral Tab Take 0.5 tablets (3.125 mg total) by mouth 2 (two) times daily with meals. 30 tablet 2    sennosides (SENNA-TIME) 8.6 MG Oral Tab senna 8.6 mg tablet, [RxNorm: 723380]      cyclobenzaprine 10 MG Oral Tab Take 1 tablet (10 mg total) by mouth nightly. 30 tablet 1    fentaNYL 12 MCG/HR Transdermal Patch 72 Hr Place 1 patch onto the skin every third day. (Patient taking differently: Place 1 patch onto the skin every third day. Placed 8/10 left arm) 10 patch 0    midodrine 5 MG Oral Tab Take 1 tablet (5 mg total) by mouth 2 (two) times daily before meals. 60 tablet 0    senna-docusate 8.6-50 MG Oral Tab Take 2 tablets by mouth daily. 30 tablet 0    isosorbide dinitrate 20 MG Oral Tab Take 1 tablet (20 mg total) by mouth TID (Nitrates). 90 tablet 3    gabapentin 300 MG Oral Cap Take 1 capsule (300 mg total) by mouth 3 (three) times daily. 90 capsule 0    dapagliflozin (FARXIGA) 10 MG Oral Tab Take 1 tablet (10 mg total) by mouth daily. 30 tablet 5    spironolactone 25 MG Oral Tab       alendronate 70 MG Oral Tab Take 1 tablet (70 mg total) by mouth once a week. (Patient taking differently: Take 1 tablet (70 mg total) by mouth once a week. Every Tuesday) 12 tablet 3    methotrexate 2.5 MG Oral Tab TAKE 6 TABLETS BY MOUTH 1 TIME A WEEK 77 tablet 0    lidocaine-menthol 4-1 % External Patch Place 1 patch onto the skin daily. 30 patch 0    PANTOPRAZOLE 40 MG  Oral Tab EC TAKE 1 TABLET(40 MG) BY MOUTH TWICE DAILY BEFORE MEALS 180 tablet 0    SACUBITRIL-VALSARTAN 49-51 MG Oral Tab Take 1 tablet by mouth 2 (two) times daily. 60 tablet 1    folic acid 800 MCG Oral Tab Take 1 tablet (800 mcg total) by mouth daily. 90 tablet 0    amiodarone 200 MG Oral Tab Take 1 tablet (200 mg total) by mouth daily.      ferrous sulfate 325 (65 FE) MG Oral Tab EC Take 1 tablet (325 mg total) by mouth daily with breakfast.       Scheduled Medication:   pantoprazole  40 mg Intravenous Daily    bumetanide  1 mg Intravenous Daily    sodium ferric gluconate  125 mg Intravenous Daily    vancomycin  15 mg/kg Intravenous Q24H    meropenem  500 mg Intravenous q12h    vancomycin  125 mg Oral q12h    [Held by provider] levETIRAcetam  500 mg Intravenous Daily    heparin  5,000 Units Subcutaneous Q8H BROOKLYNN    acetaminophen  650 mg Oral Q8H    sacubitril-valsartan  1 tablet Oral BID    metoprolol succinate  25 mg Oral Daily Beta Blocker    [Held by provider] midodrine  5 mg Oral TID    sodium hypochlorite   Topical BID    amiodarone  200 mg Oral Daily    folic acid  800 mcg Oral Daily    gabapentin  300 mg Oral TID    [Held by provider] isosorbide dinitrate  20 mg Oral TID (Nitrates)     Continuous Infusing Medication:   sodium bicarbonate in D5W infusion 100 mEq (09/04/24 1223)     PRN Medications:    loperamide    HYDROcodone-acetaminophen    HYDROcodone-acetaminophen    HYDROmorphone **OR** [DISCONTINUED] HYDROmorphone **OR** [DISCONTINUED] HYDROmorphone    senna-docusate    polyethylene glycol (PEG 3350)    glucose **OR** glucose **OR** glucose-vitamin C **OR** dextrose **OR** glucose **OR** glucose **OR** glucose-vitamin C    traMADol    ondansetron    metoclopramide    acetaminophen       PHYSICAL EXAM:  /70 (BP Location: Left arm)   Pulse 60   Temp 96.9 °F (36.1 °C) (Temporal)   Resp 16   Wt 149 lb 14.6 oz (68 kg)   SpO2 100%   BMI 25.73 kg/m²      CONSTITUTIONAL: alert, no apparent  distress  HEENT: atraumatic normocephalic  MOUTH: mucous membranes are moist. No OP exudates  NECK/THROAT: no JVD. Trachea midline. No obvious thyromegaly  LUNG: clear upper with diminished at bases. No wheezing. Chest symmetric with respiratory motion  HEART: regular rate and rhythm, + m  ABD: soft non tender. + bowel sounds. No organomegaly noted  EXT: no clubbing, cyanosis. + b/l LE edema noted      IMAGES:   Reviewed in EMR      LABS:  Recent Labs   Lab 09/03/24 0424 09/03/24 2125 09/04/24  0507   RBC 2.61* 2.89* 2.48*   HGB 8.0* 8.8* 7.8*   HCT 25.4* 30.1* 24.3*   MCV 97.3 104.2* 98.0   MCH 30.7 30.4 31.5   MCHC 31.5 29.2* 32.1   RDW 22.0* 21.9* 21.5*   NEPRELIM 14.09* 24.54* 26.29*   WBC 23.0* 33.6* 35.7*   .0 230.0 203.0       Recent Labs   Lab 09/03/24 2125 09/04/24  0507 09/04/24  0943   * 65* 266*   BUN 36* 38* 35*   CREATSERUM 2.19* 2.06* 1.86*   EGFRCR 23* 24* 27*   CA 9.3 8.5* 7.6*   ALB 3.4 2.9* 2.8*    145 147*   K 4.9 4.8 4.8   * 118* 122*   CO2 11.0* 11.0* 12.0*   ALKPHO 283* 231* 212*   AST 87* 92* 94*   ALT 33 35 33   BILT 1.0 1.1 1.0   TP 7.4 6.5 6.0       ASSESSMENT/PLAN:  1. Severe sepsis  Possible intra-abdominal source vs pulm source. Ct abd/pelvis reviewed   Infected decubitus  Patient already on broad-spectrum antibiotics  ID following  If needs pleural effusion drained (thora vs chest tube) then would recommend consulting IR as would be difficult to perform at bedside     2. Infected sacral decubitus ulcer  Presented on admission  S/p surgical debridement  ID and surgery following  Wound care following     3. Lactic and metabolic acidosis  With underlying acute on chronic kidney injury  Started on bicarb drip and renal following/managing  Awaiting am labs     4. Severe nonischemic cardiomyopathy with severe pulmonary hypertension  Significant cardiomegaly on CXR with chronic basilar effusion  S/p right thoracentesis 7/12/2024 negative cytology and culture  likely secondary to CHF and CKD     5. Acute toxic and metabolic encephalopathy  Unresponsive  Head CT negative  Supportive care     6. Severe rheumatoid arthritis with significant joint deformities  Bedbound with chronic sacral decub     7. Acute on chronic respiratory failure with hypoxia  O2 therapy     8. DVT prophylaxis  Heparin subcu     9. DNAR/select  Prognosis is guarded and further setting of limited appropriate  Consult palliative care      Thank you for the opportunity to care for Margaret Massey DO, MPH  Pulmonary Critical Care Medicine  [facility]

## 2024-09-05 NOTE — PALLIATIVE CARE NOTE
[facility]  part of [facility]  Palliative Care Progress Note    Margaret Silverio Patient Status:  Inpatient    3/14/1945 MRN [id number]   Location [facility] 4W/SW/SE Attending Fernando Galan MD   Hosp Day # 24 PCP Johnathon Rodriguez,      The  Cures Act makes medical notes like these available to patients in the interest of transparency. Please be advised this is a medical document. Medical documents are intended to carry relevant information, facts as evident, and the clinical opinion of the practitioner. The medical note is intended as peer to peer communication and may appear blunt or direct. It is written in medical language and may contain abbreviations or verbiage that are unfamiliar.     Subjective     When I entered the room Margaret is in the bed she appears a lot weaker than the last time I saw her about a week ago. She is very weak. No family are present at the bedside.      Review of pertinent medication requirements in past 24 hours:  Norco 10/325 X3, Dilaudid 0.2mg ivp X1    Palliative Care symptom needs assessed:   ROS limited as Margaret is very weak  Denies pain on exam  Feels some SOB    Allergies:  Allergies   Allergen Reactions    Lisinopril Coughing       Medications:     Current Facility-Administered Medications:     pantoprazole (Protonix) 40 mg in sodium chloride 0.9% PF 10 mL IV push, 40 mg, Intravenous, Daily    bumetanide (Bumex) 0.25 MG/ML injection 1 mg, 1 mg, Intravenous, Daily    sodium bicarbonate 100 mEq in dextrose 5% 1,100 mL infusion, 100 mEq, Intravenous, Continuous    sodium ferric gluconate (Ferrlecit) 125 mg in sodium chloride 0.9% 100mL IVPB premix, 125 mg, Intravenous, Daily    vancomycin (Vancocin) 1,000 mg in sodium chloride 0.9% 250 mL IVPB-ADDV, 15 mg/kg, Intravenous, Q24H    meropenem (Merrem) 500 mg in sodium chloride 0.9% 100 mL IVPB-MBP, 500 mg, Intravenous, q12h    loperamide (Imodium) cap 2 mg, 2 mg, Oral, QID  PRN    HYDROcodone-acetaminophen (Norco)  MG per tab 2 tablet, 2 tablet, Oral, Q4H PRN    vancomycin (Vancocin) cap 125 mg, 125 mg, Oral, q12h    HYDROcodone-acetaminophen (Norco)  MG per tab 1 tablet, 1 tablet, Oral, Q4H PRN    [Held by provider] levETIRAcetam (Keppra) 500 mg/5mL injection 500 mg, 500 mg, Intravenous, Daily    heparin (Porcine) 5000 UNIT/ML injection 5,000 Units, 5,000 Units, Subcutaneous, Q8H [place]    HYDROmorphone (Dilaudid) 1 MG/ML injection 0.2 mg, 0.2 mg, Intravenous, Q4H PRN **OR** [DISCONTINUED] HYDROmorphone (Dilaudid) 1 MG/ML injection 0.4 mg, 0.4 mg, Intravenous, Q3H PRN **OR** [DISCONTINUED] HYDROmorphone (Dilaudid) 1 MG/ML injection 0.8 mg, 0.8 mg, Intravenous, Q3H PRN    acetaminophen (Tylenol) 160 MG/5ML oral liquid 650 mg, 650 mg, Oral, Q8H    senna-docusate (Senokot-S) 8.6-50 MG per tab 2 tablet, 2 tablet, Oral, Daily PRN    polyethylene glycol (PEG 3350) (Miralax) 17 g oral packet 17 g, 17 g, Oral, Daily PRN    glucose (Dex4) 15 GM/59ML oral liquid 15 g, 15 g, Oral, Q15 Min PRN **OR** glucose (Glutose) 40% oral gel 15 g, 15 g, Oral, Q15 Min PRN **OR** glucose-vitamin C (Dex-4) chewable tab 4 tablet, 4 tablet, Oral, Q15 Min PRN **OR** dextrose 50% injection 50 mL, 50 mL, Intravenous, Q15 Min PRN **OR** glucose (Dex4) 15 GM/59ML oral liquid 30 g, 30 g, Oral, Q15 Min PRN **OR** glucose (Glutose) 40% oral gel 30 g, 30 g, Oral, Q15 Min PRN **OR** glucose-vitamin C (Dex-4) chewable tab 8 tablet, 8 tablet, Oral, Q15 Min PRN    traMADol (Ultram) tab 50 mg, 50 mg, Oral, Q12H PRN    sacubitril-valsartan (Entresto) 24-26 MG per tab 1 tablet, 1 tablet, Oral, BID    metoprolol succinate ER (Toprol XL) 24 hr tab 25 mg, 25 mg, Oral, Daily Beta Blocker    [Held by provider] midodrine (ProAmatine) tab 5 mg, 5 mg, Oral, TID    ondansetron (Zofran) 4 MG/2ML injection 4 mg, 4 mg, Intravenous, Q6H PRN    metoclopramide (Reglan) 5 mg/mL injection 5 mg, 5 mg, Intravenous, Q8H PRN    sodium  hypochlorite (Dakin's) 0.125 % external solution, , Topical, BID    acetaminophen (Tylenol Extra Strength) tab 500 mg, 500 mg, Oral, Q4H PRN    amiodarone (Pacerone) tab 200 mg, 200 mg, Oral, Daily    folic acid (Folvite) tab 800 mcg, 800 mcg, Oral, Daily    gabapentin (Neurontin) cap 300 mg, 300 mg, Oral, TID    [Held by provider] isosorbide dinitrate (Isordil) tab 20 mg, 20 mg, Oral, TID (Nitrates)    Objective     Vital Signs:  Blood pressure 113/70, pulse 60, temperature 96.9 °F (36.1 °C), temperature source Temporal, resp. rate 16, weight 149 lb 14.6 oz (68 kg), SpO2 100%.  Body mass index is 25.73 kg/m².  Present Level of pain: denies pain on exam she did get pain medication today.   Non-verbal signs of pain present: No    Physical Exam:  General: Margaret is in the bed she is very weak and has difficulty holding her head up, + generalized pitting edema  HEENT: dry oral MM  Cardiac: + murmer regular rate and rhythm, S1, S2 normal, no rub or gallop.  Lungs: diminished without wheezes.  Normal excursions and effort. 02 NC HF 2 liters  Abdomen: Soft, flat non-tender, + bowel sounds, no rebound or guarding, + BM  Extremities: bilateral upper extremity edema pitting, hands are very cold. She stated she always has cold hands. Generalized pitting edema.   Neurologic: very weak slow to respond  + sacral wound see wound care note     Prior to admission Palliative performance scale PPSv2 (%): 30    Hematology:  Lab Results   Component Value Date    WBC 35.7 (H) 09/04/2024    HGB 7.8 (L) 09/04/2024    HCT 24.3 (L) 09/04/2024    .0 09/04/2024       Coags:  Lab Results   Component Value Date    INR 1.72 (H) 09/04/2024    PTT 39.2 (H) 09/04/2024       Chemistry:  Lab Results   Component Value Date    CREATSERUM 1.86 (H) 09/04/2024    BUN 35 (H) 09/04/2024     (H) 09/04/2024    K 4.8 09/04/2024     (H) 09/04/2024    CO2 12.0 (L) 09/04/2024     (H) 09/04/2024    CA 7.6 (L) 09/04/2024    ALB 2.8 (L)  09/04/2024    ALKPHO 212 (H) 09/04/2024    BILT 1.0 09/04/2024    TP 6.0 09/04/2024    AST 94 (H) 09/04/2024    ALT 33 09/04/2024    MG 2.5 09/04/2024    MG 2.6 09/04/2024    PHOS 5.7 (H) 09/04/2024       Imaging:  XR CHEST AP PORTABLE  (CPT=71045)    Result Date: 9/5/2024  CONCLUSION:   No significant change from 09/03/2024.  Moderate cardiomegaly with interstitial pulmonary edema.  Bilateral perihilar and lower lobe alveolar opacity, likely alveolar edema with other superimposed infiltrate less likely.  Unchanged small left greater than right pleural effusions.  Again, right effusion may be loculated       Dictated by (CST): Nadine Blankenship MD on 9/05/2024 at 7:20 AM     Finalized by (CST): Nadine Blankenship MD on 9/05/2024 at 7:22 AM          CT ABDOMEN+PELVIS(CPT=74176)    Result Date: 9/4/2024  CONCLUSION:  1. Limited non-contrast examination, which is further confounded by patient positioning.  2. Long segment/diffuse colonic wall thickening with pericolonic inflammatory stranding.  Findings raise suspicion for infectious or inflammatory colitis.  Please ensure that the patient has recent Clostridium difficile testing.  No definite free intraperitoneal air, well-defined/drainable intra-abdominal collection, pneumatosis intestinalis, or portal venous gas.  No evidence of bowel obstruction. 3. Large sacral decubitus ulcer again identified.  There is edema and phlegmon surrounding the ulcer, but no well-defined/drainable collection to suggest abscess by noncontrast CT.  No definite CT findings of osteomyelitis at the adjacent sacrum or coccyx. 4. Congestive failure and/or fluid overload with loculated moderate left and small to moderate right pleural effusions, severe cardiomegaly, small volume intra-abdominal ascites, and severe anasarca. 5. Coronary and peripheral atherosclerosis.  Partially imaged AICD electrodes as well as mitral valve repair. 6. Aforementioned loculated right pleural effusion demonstrates  mild peripheral thickening; superinfection/empyema cannot be excluded.  Dependent airspace disease in the left greater than right lower lobes is favored to represent compressive atelectasis,  but coexistent infection/pneumonia should be excluded on the basis of clinical parameters. 7. Sludge or vicarious excretion of contrast in the gallbladder.  No evidence of acute cholecystitis. 8. Stable morphologic changes of chronic pancreatitis.  9. Lesser incidental findings as above.   Dictated by (CST): Aleks Diana MD on 9/04/2024 at 2:34 PM     Finalized by (CST): Aleks Diana MD on 9/04/2024 at 2:48 PM          CT BRAIN OR HEAD (CPT=70450)    Result Date: 9/4/2024  CONCLUSION:  1. No acute intracranial process by noncontrast CT technique.  2. Senescent changes of parenchymal volume loss with sequela of chronic microvascular ischemic disease. There is also large vessel atherosclerosis.   3. An empty sella is seen with an expansile appearance of the sella turcica, which may reflect normal variation or could be sequela of benign idiopathic intracranial hypertension.  4. Lesser incidental findings as above.   Dictated by (CST): Corey Posey MD on 9/04/2024 at 11:19 AM     Finalized by (CST): Corey Posey MD on 9/04/2024 at 11:22 AM             Summary of Discussion    9/5/24 Addendum: Barbi returned my phone call, discussed overall decline despite our best efforts. Barbi stated when they originally spoke with hospice her mother did not want hospice. Barbi is going to speak with father and will discuss hospice/comfort measures. Barbi will let us know if they decide to speak with hospice again.     9/5/24 was asked to see Margaret again by CM as Margaret experienced RRT due to increased SOB was transferred back to ICU 9/3/24 for sepsis. I called Barbi no answer I left message to return my call.       8/29/24 followed up with pt the family met with hospice team yesterday. Family are declining hospice at  this time and are wanting Margaret to discharge to [facility]. Will have CPC follow at the [facility] upon discharge.     8/27/24 Followed up with Margaret today, she is crying and in pain. Margaret stopped Fentanyl patch she states she doesn't want it due to the drowsiness it causes. Discussed current pain regimen and encouraged long acting to help with chronic pain and sacral pain.  Margaret is struggling as she experiences chronic pain however the pain medication causes increased drowsiness. I have scheduled Tylenol and she refuses the Tylenol along with other medications at times. Appetite has been poor.   I called Barbi pts dtr provided overall clinical update and pain management. Barbi stated she is interested in [facility] for her mother for [facility]. Barbi is also interested in hospice informational session. I notified the hospice team and CM.    8/26/24 followed up with pt today, she is asking about going home, she is indicating interest in comfort measures at home. Margaret misses her  and kids and feels like she is declining and not getting better while here at the hospital. Discussed elevated WBC's CT scan today with Margaret.   I called dtr Barbi and dicussed her mothers wishes to return to home. I discussed meeting with hospice team for informational session.   Barbi wants to speak with her father first and see what CT scan results will be. She is interested in hospice informational session however will wait until I follow up tomorrow to discuss further.     8/23/24 followed up with pt today, she is awake and alert. RN is feeding her breakfast cereal. Pt c/o pain in sacral decubiti with scrunched forehead. Discussed with RN use of Norco and holding for any drowsiness. Will have PT/OT try to work with pt eval and treat. Will need to continue to see how she progresses. Ongoing GOC will be needed pending her progress.     8/21/24 followed up with Margaret today she is more awake on exam, her dtr Barbi is  present at the bedside, Margaret has been refusing to eat, she states the food does not taste good. Barbi stated her father brought in some soup from home a day ago and she ate the soup. Discussed bringing in food from home that she likes to eat, discussed with Margaret need for nutritional protein to help with wound healing and general healing.   I explored with Margaret if she is feeling depressed she denied depression. Discussed use of Remeron to help with appetite, there is concern for increased drowsiness with the Remeron, discussed use of Cymbalta, pt declines antidepressant at this time.   I explored with Margaret if she wanted feeding tube she stated no she would not want feeding tube.   Barbi stated she asked her mother if she wanted to die as she is not taking her medications nor eating well.  Margaret stated she is not ready to die.   I discussed use of the Norco as needed  as pt expressed not wanting to be too sleepy however she continues to experience a lot pain in her buttock area. She has not taken anything for pain since last night and has been refusing to take the scheduled tylenol. We discussed this and I asked the RN to provide dose of Norco. I discussed with pt and dtr to call if needed I will follow up on Friday as I am off tomorrow. One of my coworkers will follow up tomorrow if needed.     8/20/24 Followed up with Margaret today, she is not responsive for me on exam, tries to open her eyes when asked. vital signs stable, she did receive 2 doses of the 0.4mg Dilaudid over the last 24 hours. I spoke with her dtr Barbi expressed concern over managing her pain and trying to keep her awake and alert to eat and participate in care.   Barbi stated her mother experienced this last week where she was not responsive.   Rn reported pt was crying overnight due to pain  Will continue with the Fentanyl patch as that is not new. Decrease the Dilaudid to 0.2mg every 4 hours and will monitor. discussed with  dtr Barbi.       Assessment and Recommendation      Pressure injury of skin of sacral region, unspecified injury stage    [id number] following.    S/P I&D 8/13/24    Pancytopenia    Non ischemic Cardiomyopathy    SSS PPM/AICD    Severe aortic stenosis    Afib    Urinary tract infection without hematuria,    Anemia    Encephalopathy    CKD    H/o GIB    Frequent hospitalizations    H/o hypotension    9/3/24 RRT transferred back to ICU due to  sepsis     Pain-concern of increased drowsiness will monitor pt. -pt has been refusing to take medications, I discussed this with her.   -continue with scheduled Tylenol 650mg every 8 hours-pt has been refusing   -discontinued Fentanyl patch 12mcg-pt refused   -Dilaudid 0.2mg every 4 hours as needed for more severe pain  -continue with gabapentin 300mg TID  -stop the Flexeril nightly  -Norco 10/325 1-2 as needed every 4 hours-hold for any drowsiness.      Poor appetite  -offered Remeron daily for depression and appetite, pt declines this at this time.   Offered Cymbalta she declined      Bowel regimen  - senna daily as needed  -Miralax daily as needed     Goals of care counseling  -see above for details  -Pt is DNAR/DNI with selective treatment  -Agreeable to palliative care following  -Dispo: TBD pending pts progress. SW to help with dc planning.   - not requested.   -Margaret stated she would not want feeding tube.   -ongoing GOC discussions will be needed over time.  -family met with the hospice team 8/28/24 they are declining hospice at this time. Pt and family want [facility] upon discharge.    -Provided emotional support to pt/family who are coping adequately     Advance care planning  -see above for details  -Pt's dtr Barbi Jean Claude is HCPOA [phone]  -HCPOA and POLST pw are on file in [employer].      Palliative Performance Scale 20%    Discussed with Alma Delgado CM, RN and  Dr. Alvarez     Palliative Care Follow Up: Palliative care team will continue to  follow. Thank You. Feel free to contact our team with any questions or concerns.    Thank you for allowing Palliative Care services to participate in the care of Margaret Silverio.    A total of 25 minutes were spent on this follow-up, which included all of the following: chart review, direct face to face contact, history taking, physical examination, counseling and coordinating care, and documentation.     Macy Marrufo, [id number]  9/5/24/2024  11:29AM  Palliative Care Services

## 2024-09-05 NOTE — PLAN OF CARE
Problem: PAIN - ADULT  Goal: Verbalizes/displays adequate comfort level or patient's stated pain goal  Description: INTERVENTIONS:  - Encourage pt to monitor pain and request assistance  - Assess pain using appropriate pain scale  - Administer analgesics based on type and severity of pain and evaluate response  - Implement non-pharmacological measures as appropriate and evaluate response  - Consider cultural and social influences on pain and pain management  - Manage/alleviate anxiety  - Utilize distraction and/or relaxation techniques  - Monitor for opioid side effects  - Notify MD/LIP if interventions unsuccessful or patient reports new pain  - Anticipate increased pain with activity and pre-medicate as appropriate  Outcome: Progressing     Problem: RISK FOR INFECTION - ADULT  Goal: Absence of fever/infection during anticipated neutropenic period  Description: INTERVENTIONS  - Monitor WBC  - Administer growth factors as ordered  - Implement neutropenic guidelines  Outcome: Progressing     Problem: SKIN/TISSUE INTEGRITY - ADULT  Goal: Incision(s), wounds(s) or drain site(s) healing without S/S of infection  Description: INTERVENTIONS:  - Assess and document risk factors for pressure ulcer development  - Assess and document skin integrity  - Assess and document dressing/incision, wound bed, drain sites and surrounding tissue  - Implement wound care per orders  - Initiate isolation precautions as appropriate  - Initiate Pressure Ulcer prevention bundle as indicated  Outcome: Progressing     Problem: RESPIRATORY - ADULT  Goal: Achieves optimal ventilation and oxygenation  Description: INTERVENTIONS:  - Assess for changes in respiratory status  - Assess for changes in mentation and behavior  - Position to facilitate oxygenation and minimize respiratory effort  - Oxygen supplementation based on oxygen saturation or ABGs  - Provide Smoking Cessation handout, if applicable  - Encourage broncho-pulmonary hygiene  including cough, deep breathe, Incentive Spirometry  - Assess the need for suctioning and perform as needed  - Assess and instruct to report SOB or any respiratory difficulty  - Respiratory Therapy support as indicated  - Manage/alleviate anxiety  - Monitor for signs/symptoms of CO2 retention  Outcome: Progressing     Problem: CARDIOVASCULAR - ADULT  Goal: Maintains optimal cardiac output and hemodynamic stability  Description: INTERVENTIONS:  - Monitor vital signs, rhythm, and trends  - Monitor for bleeding, hypotension and signs of decreased cardiac output  - Evaluate effectiveness of vasoactive medications to optimize hemodynamic stability  - Monitor arterial and/or venous puncture sites for bleeding and/or hematoma  - Assess quality of pulses, skin color and temperature  - Assess for signs of decreased coronary artery perfusion - ex. Angina  - Evaluate fluid balance, assess for edema, trend weights  Outcome: Progressing

## 2024-09-05 NOTE — PROGRESS NOTES
09/05/24 1341   Wound 08/13/24 Sacrum   Date First Assessed/Time First Assessed: 08/13/24 1034   Present on Original Admission: Yes  Primary Wound Type: (c) Pressure Injury  Location: Sacrum  Wound Description (Comments): s/p jyoti Mckeon 8/13   Wound Image    Site Assessment Brown;Fragile;Granulation tissue;Moist;Painful;Red;Tan   Closure Not approximated   Drainage Amount Small   Drainage Description Serosanguineous   Treatments Dakins   Dressing 4x4s;Aquacel Foam   Dressing Changed Changed   Dressing Status Dressing Changed;Removed;Old drainage   Wound Length (cm) 4.2 cm   Wound Width (cm) 8.7 cm   Wound Surface Area (cm^2) 36.54 cm^2   Rochelle-wound Assessment Clean;Intact;Fragile   Wound Granulation Tissue Pink;Red   Wound Bed Granulation (%) 80 %   Wound Bed Slough (%) 20 %   State of Healing Early/partial granulation   Wound Odor None   Wound Follow Up   Follow up needed Yes     Pt seen after receiving pain meds per bedside RN who was present to assist with pt positioning for sacral wound care. See above for resolving slough in wound bed. Recommend to continue dakins moist gauze dressings q 12 . Pillow placed under the right hip. Bilateral heels checked, intact, bordered foam dressings re applied, heels elevated off of mattress with a pillow.

## 2024-09-05 NOTE — PLAN OF CARE
Patient is A&Ox3, following commands, 2L hiflo NC, PRN norco and dilaudid given. CHG bath done. Wound dressings changed. Bed is locked and in lowest position. Call light is within reach. Daughter Barbi updated on plan of care.  Problem: Patient Centered Care  Goal: Patient preferences are identified and integrated in the patient's plan of care  Description: Interventions:  - What would you like us to know as we care for you?   - Provide timely, complete, and accurate information to patient/family  - Incorporate patient and family knowledge, values, beliefs, and cultural backgrounds into the planning and delivery of care  - Encourage patient/family to participate in care and decision-making at the level they choose  - Honor patient and family perspectives and choices  Outcome: Progressing     Problem: Patient/Family Goals  Goal: Patient/Family Long Term Goal  Description: Patient's Long Term Goal: to go home    Interventions:  - follow MD order  - See additional Care Plan goals for specific interventions  Outcome: Progressing  Goal: Patient/Family Short Term Goal  Description: Patient's Short Term Goal: to be pain free    Interventions:   - Follow medication regimen  - See additional Care Plan goals for specific interventions  Outcome: Progressing     Problem: PAIN - ADULT  Goal: Verbalizes/displays adequate comfort level or patient's stated pain goal  Description: INTERVENTIONS:  - Encourage pt to monitor pain and request assistance  - Assess pain using appropriate pain scale  - Administer analgesics based on type and severity of pain and evaluate response  - Implement non-pharmacological measures as appropriate and evaluate response  - Consider cultural and social influences on pain and pain management  - Manage/alleviate anxiety  - Utilize distraction and/or relaxation techniques  - Monitor for opioid side effects  - Notify MD/LIP if interventions unsuccessful or patient reports new pain  - Anticipate increased  pain with activity and pre-medicate as appropriate  Outcome: Progressing     Problem: RISK FOR INFECTION - ADULT  Goal: Absence of fever/infection during anticipated neutropenic period  Description: INTERVENTIONS  - Monitor WBC  - Administer growth factors as ordered  - Implement neutropenic guidelines  Outcome: Progressing     Problem: SAFETY ADULT - FALL  Goal: Free from fall injury  Description: INTERVENTIONS:  - Assess pt frequently for physical needs  - Identify cognitive and physical deficits and behaviors that affect risk of falls.  - Ridott fall precautions as indicated by assessment.  - Educate pt/family on patient safety including physical limitations  - Instruct pt to call for assistance with activity based on assessment  - Modify environment to reduce risk of injury  - Provide assistive devices as appropriate  - Consider OT/PT consult to assist with strengthening/mobility  - Encourage toileting schedule  Outcome: Progressing     Problem: SKIN/TISSUE INTEGRITY - ADULT  Goal: Skin integrity remains intact  Description: INTERVENTIONS  - Assess and document risk factors for pressure ulcer development  - Assess and document skin integrity  - Monitor for areas of redness and/or skin breakdown  - Initiate interventions, skin care algorithm/standards of care as needed  Outcome: Progressing  Goal: Incision(s), wounds(s) or drain site(s) healing without S/S of infection  Description: INTERVENTIONS:  - Assess and document risk factors for pressure ulcer development  - Assess and document skin integrity  - Assess and document dressing/incision, wound bed, drain sites and surrounding tissue  - Implement wound care per orders  - Initiate isolation precautions as appropriate  - Initiate Pressure Ulcer prevention bundle as indicated  Outcome: Progressing  Goal: Oral mucous membranes remain intact  Description: INTERVENTIONS  - Assess oral mucosa and hygiene practices  - Implement preventative oral hygiene regimen  -  Implement oral medicated treatments as ordered  Outcome: Progressing     Problem: RESPIRATORY - ADULT  Goal: Achieves optimal ventilation and oxygenation  Description: INTERVENTIONS:  - Assess for changes in respiratory status  - Assess for changes in mentation and behavior  - Position to facilitate oxygenation and minimize respiratory effort  - Oxygen supplementation based on oxygen saturation or ABGs  - Provide Smoking Cessation handout, if applicable  - Encourage broncho-pulmonary hygiene including cough, deep breathe, Incentive Spirometry  - Assess the need for suctioning and perform as needed  - Assess and instruct to report SOB or any respiratory difficulty  - Respiratory Therapy support as indicated  - Manage/alleviate anxiety  - Monitor for signs/symptoms of CO2 retention  Outcome: Progressing     Problem: CARDIOVASCULAR - ADULT  Goal: Maintains optimal cardiac output and hemodynamic stability  Description: INTERVENTIONS:  - Monitor vital signs, rhythm, and trends  - Monitor for bleeding, hypotension and signs of decreased cardiac output  - Evaluate effectiveness of vasoactive medications to optimize hemodynamic stability  - Monitor arterial and/or venous puncture sites for bleeding and/or hematoma  - Assess quality of pulses, skin color and temperature  - Assess for signs of decreased coronary artery perfusion - ex. Angina  - Evaluate fluid balance, assess for edema, trend weights  Outcome: Progressing  Goal: Absence of cardiac arrhythmias or at baseline  Description: INTERVENTIONS:  - Continuous cardiac monitoring, monitor vital signs, obtain 12 lead EKG if indicated  - Evaluate effectiveness of antiarrhythmic and heart rate control medications as ordered  - Initiate emergency measures for life threatening arrhythmias  - Monitor electrolytes and administer replacement therapy as ordered  Outcome: Progressing     Problem: METABOLIC/FLUID AND ELECTROLYTES - ADULT  Goal: Glucose maintained within prescribed  range  Description: INTERVENTIONS:  - Monitor Blood Glucose as ordered  - Assess for signs and symptoms of hyperglycemia and hypoglycemia  - Administer ordered medications to maintain glucose within target range  - Assess barriers to adequate nutritional intake and initiate nutrition consult as needed  - Instruct patient on self management of diabetes  Outcome: Progressing  Goal: Electrolytes maintained within normal limits  Description: INTERVENTIONS:  - Monitor labs and rhythm and assess patient for signs and symptoms of electrolyte imbalances  - Administer electrolyte replacement as ordered  - Monitor response to electrolyte replacements, including rhythm and repeat lab results as appropriate  - Fluid restriction as ordered  - Instruct patient on fluid and nutrition restrictions as appropriate  Outcome: Progressing  Goal: Hemodynamic stability and optimal renal function maintained  Description: INTERVENTIONS:  - Monitor labs and assess for signs and symptoms of volume excess or deficit  - Monitor intake, output and patient weight  - Monitor urine specific gravity, serum osmolarity and serum sodium as indicated or ordered  - Monitor response to interventions for patient's volume status, including labs, urine output, blood pressure (other measures as available)  - Encourage oral intake as appropriate  - Instruct patient on fluid and nutrition restrictions as appropriate  Outcome: Progressing     Problem: Delirium  Goal: Minimize duration of delirium  Description: Interventions:  - Encourage use of hearing aids, eye glasses  - Promote highest level of mobility daily  - Provide frequent reorientation  - Promote wakefulness i.e. lights on, blinds open  - Promote sleep, encourage patient's normal rest cycle i.e. lights off, TV off, minimize noise and interruptions  - Encourage family to assist in orientation and promotion of home routines  Outcome: Progressing     Problem: Diabetes/Glucose Control  Goal: Glucose  maintained within prescribed range  Description: INTERVENTIONS:  - Monitor Blood Glucose as ordered  - Assess for signs and symptoms of hyperglycemia and hypoglycemia  - Administer ordered medications to maintain glucose within target range  - Assess barriers to adequate nutritional intake and initiate nutrition consult as needed  - Instruct patient on self management of diabetes  Outcome: Progressing

## 2024-09-05 NOTE — PROGRESS NOTES
[facility]  part of [facility]    Progress Note    Margaret Silverio Patient Status:  Inpatient    3/14/1945 MRN [id number]   Location [facility] 2W/SW Attending Raiza Alvarez MD   Hosp Day # 24 PCP Johnathon Rodriguez,        Subjective:   Margaret Silverio is resting in bed. Does not respond to questions.     Objective:   Blood pressure 113/70, pulse 60, temperature 96.9 °F (36.1 °C), temperature source Temporal, resp. rate 16, weight 149 lb 14.6 oz (68 kg), SpO2 100%.    Physical Exam:    General: No acute distress.   Respiratory: Clear to auscultation bilaterally. No wheezes. No rhonchi.  Cardiovascular: S1, S2. Regular rate and rhythm. No murmurs, rubs or gallops.   Abdomen: Soft, nontender, nondistended.  Positive bowel sounds. No rebound or guarding.  Neurologic: No focal neurological deficits.   Musculoskeletal: Moves all extremities.  Extremities: No edema.    Results:     Lab Results   Component Value Date    WBC 28.7 (H) 2024    HGB 8.4 (L) 2024    HCT 26.7 (L) 2024    .0 2024    CREATSERUM 1.86 (H) 2024    BUN 35 (H) 2024     (H) 2024    K 4.8 2024     (H) 2024    CO2 12.0 (L) 2024     (H) 2024    CA 7.6 (L) 2024    ALB 2.8 (L) 2024    ALKPHO 212 (H) 2024    BILT 1.0 2024    TP 6.0 2024    AST 94 (H) 2024    ALT 33 2024    PTT 39.2 (H) 2024    INR 1.72 (H) 2024    T4F 1.1 2024    TSH 6.866 (H) 2024    LIP 32 2024    ESRML 40 (H) 2024    CRP 9.00 (H) 2024    MG 2.5 2024    MG 2.6 2024    PHOS 5.7 (H) 2024    B12 >2,000 (H) 2024       XR CHEST AP PORTABLE  (CPT=71045)    Result Date: 2024  CONCLUSION:   No significant change from 2024.  Moderate cardiomegaly with interstitial pulmonary edema.  Bilateral perihilar and lower lobe alveolar opacity, likely alveolar  edema with other superimposed infiltrate less likely.  Unchanged small left greater than right pleural effusions.  Again, right effusion may be loculated       Dictated by (CST): Nadine Blankenship MD on 9/05/2024 at 7:20 AM     Finalized by (CST): Nadine Blankenship MD on 9/05/2024 at 7:22 AM          CT ABDOMEN+PELVIS(CPT=74176)    Result Date: 9/4/2024  CONCLUSION:  1. Limited non-contrast examination, which is further confounded by patient positioning.  2. Long segment/diffuse colonic wall thickening with pericolonic inflammatory stranding.  Findings raise suspicion for infectious or inflammatory colitis.  Please ensure that the patient has recent Clostridium difficile testing.  No definite free intraperitoneal air, well-defined/drainable intra-abdominal collection, pneumatosis intestinalis, or portal venous gas.  No evidence of bowel obstruction. 3. Large sacral decubitus ulcer again identified.  There is edema and phlegmon surrounding the ulcer, but no well-defined/drainable collection to suggest abscess by noncontrast CT.  No definite CT findings of osteomyelitis at the adjacent sacrum or coccyx. 4. Congestive failure and/or fluid overload with loculated moderate left and small to moderate right pleural effusions, severe cardiomegaly, small volume intra-abdominal ascites, and severe anasarca. 5. Coronary and peripheral atherosclerosis.  Partially imaged AICD electrodes as well as mitral valve repair. 6. Aforementioned loculated right pleural effusion demonstrates mild peripheral thickening; superinfection/empyema cannot be excluded.  Dependent airspace disease in the left greater than right lower lobes is favored to represent compressive atelectasis,  but coexistent infection/pneumonia should be excluded on the basis of clinical parameters. 7. Sludge or vicarious excretion of contrast in the gallbladder.  No evidence of acute cholecystitis. 8. Stable morphologic changes of chronic pancreatitis.  9. Lesser  incidental findings as above.   Dictated by (CST): Aleks Diana MD on 9/04/2024 at 2:34 PM     Finalized by (CST): Aleks Diana MD on 9/04/2024 at 2:48 PM          CT BRAIN OR HEAD (CPT=70450)    Result Date: 9/4/2024  CONCLUSION:  1. No acute intracranial process by noncontrast CT technique.  2. Senescent changes of parenchymal volume loss with sequela of chronic microvascular ischemic disease. There is also large vessel atherosclerosis.   3. An empty sella is seen with an expansile appearance of the sella turcica, which may reflect normal variation or could be sequela of benign idiopathic intracranial hypertension.  4. Lesser incidental findings as above.   Dictated by (CST): Corey Posey MD on 9/04/2024 at 11:19 AM     Finalized by (CST): Corey Posey MD on 9/04/2024 at 11:22 AM          XR CHEST AP PORTABLE  (CPT=71045)    Result Date: 9/3/2024  CONCLUSION:  1. Moderate cardiomegaly prominent central vasculature. 2. Bilateral perihilar and lower lobe parenchymal abnormality with moderate pleural effusions.  Slight improved aeration in the upper lobes.    Dictated by (CST): Justin Stark MD on 9/03/2024 at 10:59 AM     Finalized by (CST): Justin Stark MD on 9/03/2024 at 11:02 AM              pantoprazole  40 mg Intravenous Daily    bumetanide  1 mg Intravenous Daily    sodium ferric gluconate  125 mg Intravenous Daily    vancomycin  15 mg/kg Intravenous Q24H    meropenem  500 mg Intravenous q12h    vancomycin  125 mg Oral q12h    [Held by provider] levETIRAcetam  500 mg Intravenous Daily    heparin  5,000 Units Subcutaneous Q8H BROOKLYNN    acetaminophen  650 mg Oral Q8H    sacubitril-valsartan  1 tablet Oral BID    metoprolol succinate  25 mg Oral Daily Beta Blocker    [Held by provider] midodrine  5 mg Oral TID    sodium hypochlorite   Topical BID    amiodarone  200 mg Oral Daily    folic acid  800 mcg Oral Daily    gabapentin  300 mg Oral TID    [Held by provider] isosorbide dinitrate  20 mg  Oral TID (Nitrates)       loperamide    HYDROcodone-acetaminophen    HYDROcodone-acetaminophen    HYDROmorphone **OR** [DISCONTINUED] HYDROmorphone **OR** [DISCONTINUED] HYDROmorphone    senna-docusate    polyethylene glycol (PEG 3350)    glucose **OR** glucose **OR** glucose-vitamin C **OR** dextrose **OR** glucose **OR** glucose **OR** glucose-vitamin C    traMADol    ondansetron    metoclopramide    acetaminophen      Assessment and Plan:      Sepsis, acute, present on admission.  Hypotension not present.  Lactic acid level is elevated.  Source of infection unknwon.  Endorgan damage in form of ams.  Tachycardia present.  Tachypnea present.  Hypoxia present.  Fevers/hypothermia present.  Leukocytosis present.  -Sepsis bolus IV fluids done  -Antibiotics: merrem vanc  -Blood cultures: ngtd  -Serial lactic acid if indicated  -ID consult appreciated  -Monitor temperature and white blood cell count  -Critical care consult     Infected sacral decubitus ulcer.  Present on admission.  Status postdebridement.  Positive blood culture with Staph epidermidis, probably contaminant.  -Meropenem, stopped 9/3  -ID and general surgery consults appreciated  -Wound care  -Frequent repositioning     Acute metabolic encephalopathy.  Waxing and waning.    -Reorientation  -Neurology consult appreciated  -Hold Keppra which may be contributing     GEORGE.  Due to infection, volume status.  Initial improvement, now Cr rising slowly.  Her baseline is around 1.2.  -BMP in the morning  -Monitor urine output  -Renal consult     None anion gap metabolic acidosis.  Worsening with bicarb of 11.  Slight worsening of renal function.  ABG shows acidosis with compensatory respiratory alkalosis.  -Nephrology consult  -Repeat BMP in the morning     Tachypnea.  May be related to acidosis.  Patient denies shortness of breath.  -Chest x-ray and abg noted  -Monitor O2 sats closely     Anemia.  Multifactorial.   -Recheck hemoglobin in the morning     Remote  history of seizures.  Has been off Keppra for at least 1 year.  Restarted during this admission initially for concerns of subclinical seizures.  EEG was negative.  Patient now has intermittent somnolence and confusion which may be related to prolonged hospitalization but there may be some contribution from Keppra.  -Hold Keppra  -Monitor for seizure     Other problems  Sepsis, present on admission, now resolved  Hyponatremia, resolved  UTI  Nonischemic cardiomyopathy  Chronic systolic congestive heart failure  Paroxysmal atrial fibrillation  CKD stage III  Thrombocytosis     DVT Mechanical Prophylaxis:   SCDs,        DVT Pharmacologic Prophylaxis   Medication    heparin (Porcine) 5000 UNIT/ML injection 5,000 Units               code status: dnr  dispo: to be determined  malnutrition status at bottom of note           FREYA COMBS MD  09/05/24

## 2024-09-06 LAB
ALBUMIN SERPL-MCNC: 2.4 G/DL (ref 3.2–4.8)
ALBUMIN/GLOB SERPL: 0.8 {RATIO} (ref 1–2)
ALP LIVER SERPL-CCNC: 219 U/L
ALT SERPL-CCNC: 99 U/L
ANION GAP SERPL CALC-SCNC: 8 MMOL/L (ref 0–18)
AST SERPL-CCNC: 236 U/L (ref ?–34)
BILIRUB SERPL-MCNC: 0.8 MG/DL (ref 0.2–1.1)
BUN BLD-MCNC: 37 MG/DL (ref 9–23)
BUN/CREAT SERPL: 25.2 (ref 10–20)
CALCIUM BLD-MCNC: 8 MG/DL (ref 8.7–10.4)
CHLORIDE SERPL-SCNC: 116 MMOL/L (ref 98–112)
CO2 SERPL-SCNC: 21 MMOL/L (ref 21–32)
CREAT BLD-MCNC: 1.47 MG/DL
DEPRECATED RDW RBC AUTO: 67.7 FL (ref 35.1–46.3)
EGFRCR SERPLBLD CKD-EPI 2021: 36 ML/MIN/1.73M2 (ref 60–?)
ERYTHROCYTE [DISTWIDTH] IN BLOOD BY AUTOMATED COUNT: 23 % (ref 11–15)
GLOBULIN PLAS-MCNC: 3.1 G/DL (ref 2–3.5)
GLUCOSE BLD-MCNC: 84 MG/DL (ref 70–99)
GLUCOSE BLDC GLUCOMTR-MCNC: 103 MG/DL (ref 70–99)
GLUCOSE BLDC GLUCOMTR-MCNC: 157 MG/DL (ref 70–99)
HCT VFR BLD AUTO: 26.2 %
HGB BLD-MCNC: 8.4 G/DL
MAGNESIUM SERPL-MCNC: 1.8 MG/DL (ref 1.6–2.6)
MCH RBC QN AUTO: 31.1 PG (ref 26–34)
MCHC RBC AUTO-ENTMCNC: 32.1 G/DL (ref 31–37)
MCV RBC AUTO: 97 FL
OSMOLALITY SERPL CALC.SUM OF ELEC: 308 MOSM/KG (ref 275–295)
PHOSPHATE SERPL-MCNC: 3.7 MG/DL (ref 2.4–5.1)
PLATELET # BLD AUTO: 193 10(3)UL (ref 150–450)
POTASSIUM SERPL-SCNC: 3.6 MMOL/L (ref 3.5–5.1)
PROT SERPL-MCNC: 5.5 G/DL (ref 5.7–8.2)
RBC # BLD AUTO: 2.7 X10(6)UL
SODIUM SERPL-SCNC: 145 MMOL/L (ref 136–145)
VANCOMYCIN TROUGH SERPL-MCNC: 16.2 UG/ML (ref 10–20)
WBC # BLD AUTO: 20.7 X10(3) UL (ref 4–11)

## 2024-09-06 PROCEDURE — 99233 SBSQ HOSP IP/OBS HIGH 50: CPT | Performed by: HOSPITALIST

## 2024-09-06 PROCEDURE — 99233 SBSQ HOSP IP/OBS HIGH 50: CPT | Performed by: INTERNAL MEDICINE

## 2024-09-06 RX ORDER — MAGNESIUM OXIDE 400 MG/1
400 TABLET ORAL ONCE
Status: COMPLETED | OUTPATIENT
Start: 2024-09-06 | End: 2024-09-06

## 2024-09-06 RX ORDER — BUMETANIDE 0.25 MG/ML
1 INJECTION INTRAMUSCULAR; INTRAVENOUS
Status: DISCONTINUED | OUTPATIENT
Start: 2024-09-06 | End: 2024-09-10

## 2024-09-06 RX ORDER — POTASSIUM CHLORIDE 1.5 G/1.58G
40 POWDER, FOR SOLUTION ORAL ONCE
Status: COMPLETED | OUTPATIENT
Start: 2024-09-06 | End: 2024-09-06

## 2024-09-06 NOTE — PROGRESS NOTES
[facility]  part of [facility]    Progress Note    Margaret Silverio Patient Status:  Inpatient    3/14/1945 MRN [id number]   Location [facility] 2W/SW Attending Raiza Alvarez MD   Hosp Day # 25 PCP Johnathon Rodriguez,        Subjective:   Margaret Silverio is resting in bed. No acute events overnight. Family at bedside.     Objective:   Blood pressure 116/79, pulse 60, temperature 97.6 °F (36.4 °C), temperature source Temporal, resp. rate 15, weight 149 lb 14.6 oz (68 kg), SpO2 98%.    Physical Exam:    General: No acute distress.   Respiratory: Clear to auscultation bilaterally. No wheezes. No rhonchi.  Cardiovascular: S1, S2. Regular rate and rhythm. No murmurs, rubs or gallops.   Abdomen: Soft, nontender, nondistended.  Positive bowel sounds. No rebound or guarding.  Neurologic: No focal neurological deficits.   Musculoskeletal: Moves all extremities.  Extremities: No edema.    Results:     Lab Results   Component Value Date    WBC 20.7 (H) 2024    HGB 8.4 (L) 2024    HCT 26.2 (L) 2024    .0 2024    CREATSERUM 1.47 (H) 2024    BUN 37 (H) 2024     2024    K 3.6 2024     (H) 2024    CO2 21.0 2024    GLU 84 2024    CA 8.0 (L) 2024    ALB 2.4 (L) 2024    ALKPHO 219 (H) 2024    BILT 0.8 2024    TP 5.5 (L) 2024     (H) 2024    ALT 99 (H) 2024    PTT 39.2 (H) 2024    INR 1.72 (H) 2024    T4F 1.1 2024    TSH 6.866 (H) 2024    LIP 32 2024    ESRML 40 (H) 2024    CRP 9.00 (H) 2024    MG 1.8 2024    PHOS 3.7 2024    B12 >2,000 (H) 2024       XR CHEST AP PORTABLE  (CPT=71045)    Result Date: 2024  CONCLUSION:   No significant change from 2024.  Moderate cardiomegaly with interstitial pulmonary edema.  Bilateral perihilar and lower lobe alveolar opacity, likely alveolar edema with  other superimposed infiltrate less likely.  Unchanged small left greater than right pleural effusions.  Again, right effusion may be loculated       Dictated by (CST): Nadine Blankenship MD on 9/05/2024 at 7:20 AM     Finalized by (CST): Nadine Blankenship MD on 9/05/2024 at 7:22 AM          CT ABDOMEN+PELVIS(CPT=74176)    Result Date: 9/4/2024  CONCLUSION:  1. Limited non-contrast examination, which is further confounded by patient positioning.  2. Long segment/diffuse colonic wall thickening with pericolonic inflammatory stranding.  Findings raise suspicion for infectious or inflammatory colitis.  Please ensure that the patient has recent Clostridium difficile testing.  No definite free intraperitoneal air, well-defined/drainable intra-abdominal collection, pneumatosis intestinalis, or portal venous gas.  No evidence of bowel obstruction. 3. Large sacral decubitus ulcer again identified.  There is edema and phlegmon surrounding the ulcer, but no well-defined/drainable collection to suggest abscess by noncontrast CT.  No definite CT findings of osteomyelitis at the adjacent sacrum or coccyx. 4. Congestive failure and/or fluid overload with loculated moderate left and small to moderate right pleural effusions, severe cardiomegaly, small volume intra-abdominal ascites, and severe anasarca. 5. Coronary and peripheral atherosclerosis.  Partially imaged AICD electrodes as well as mitral valve repair. 6. Aforementioned loculated right pleural effusion demonstrates mild peripheral thickening; superinfection/empyema cannot be excluded.  Dependent airspace disease in the left greater than right lower lobes is favored to represent compressive atelectasis,  but coexistent infection/pneumonia should be excluded on the basis of clinical parameters. 7. Sludge or vicarious excretion of contrast in the gallbladder.  No evidence of acute cholecystitis. 8. Stable morphologic changes of chronic pancreatitis.  9. Lesser incidental  findings as above.   Dictated by (CST): Aleks Diana MD on 9/04/2024 at 2:34 PM     Finalized by (CST): Aleks Diana MD on 9/04/2024 at 2:48 PM          CT BRAIN OR HEAD (CPT=70450)    Result Date: 9/4/2024  CONCLUSION:  1. No acute intracranial process by noncontrast CT technique.  2. Senescent changes of parenchymal volume loss with sequela of chronic microvascular ischemic disease. There is also large vessel atherosclerosis.   3. An empty sella is seen with an expansile appearance of the sella turcica, which may reflect normal variation or could be sequela of benign idiopathic intracranial hypertension.  4. Lesser incidental findings as above.   Dictated by (CST): Corey Posey MD on 9/04/2024 at 11:19 AM     Finalized by (CST): Corey Posey MD on 9/04/2024 at 11:22 AM              vancomycin  15 mg/kg Intravenous Q36H    potassium chloride  40 mEq Oral Once    bumetanide  1 mg Intravenous BID (Diuretic)    magnesium oxide  400 mg Oral Once    potassium chloride  20 mEq Oral BID    pantoprazole  40 mg Intravenous Daily    sodium ferric gluconate  125 mg Intravenous Daily    vancomycin  125 mg Oral q12h    [Held by provider] levETIRAcetam  500 mg Intravenous Daily    heparin  5,000 Units Subcutaneous Q8H BROOKLYNN    acetaminophen  650 mg Oral Q8H    sacubitril-valsartan  1 tablet Oral BID    metoprolol succinate  25 mg Oral Daily Beta Blocker    [Held by provider] midodrine  5 mg Oral TID    sodium hypochlorite   Topical BID    amiodarone  200 mg Oral Daily    folic acid  800 mcg Oral Daily    gabapentin  300 mg Oral TID    [Held by provider] isosorbide dinitrate  20 mg Oral TID (Nitrates)       loperamide    HYDROcodone-acetaminophen    HYDROcodone-acetaminophen    HYDROmorphone **OR** [DISCONTINUED] HYDROmorphone **OR** [DISCONTINUED] HYDROmorphone    senna-docusate    polyethylene glycol (PEG 3350)    glucose **OR** glucose **OR** glucose-vitamin C **OR** dextrose **OR** glucose **OR** glucose **OR**  glucose-vitamin C    traMADol    ondansetron    metoclopramide    acetaminophen      Assessment and Plan:      Sepsis, acute, present on admission.  Hypotension not present.  Lactic acid level is elevated.  Source of infection unknwon.  Endorgan damage in form of ams.  Tachycardia present.  Tachypnea present.  Hypoxia present.  Fevers/hypothermia present.  Leukocytosis present.  -Sepsis bolus IV fluids done  -Antibiotics: Continue on IV vanc  -Blood cultures: ngtd  -Serial lactic acid if indicated  -ID consult appreciated  -Monitor temperature and white blood cell count  -Critical care consult appreciated      Infected sacral decubitus ulcer.  Present on admission.  Status postdebridement.  Positive blood culture with Staph epidermidis, probably contaminant.  -Meropenem, stopped 9/3  -ID and general surgery consults appreciated  -Wound care  -Frequent repositioning     Acute metabolic encephalopathy.  Waxing and waning.    -Reorientation  -Neurology consult appreciated  -Hold Keppra which may be contributing     GEORGE.  Due to infection, volume status.  Initial improvement, now Cr rising slowly.  Her baseline is around 1.2.  -BMP daily   -Monitor urine output  -Renal consult    Severe Nonischmeic CM and Severe Pulm HTN  - status post right thoracentesis 7/12/2024  - may need another thoracentesis or chest tube  if not resovevd  - consider consulting IR this weekend for them to do it on Monday      None anion gap metabolic acidosis.  Worsening with bicarb of 11.  Slight worsening of renal function.  ABG shows acidosis with compensatory respiratory alkalosis.  -Nephrology consult  -Repeat BMP daily      Tachypnea.  May be related to acidosis.  Patient denies shortness of breath.  -Chest x-ray and abg noted  -Monitor O2 sats closely  - patient is volume overloaded per Renal  - Bumex 1 mg IV BID   - CXR in AM   - patient currently on 4 liters of oxgyen   - continue on bicarb drip     Remote history of seizures.  Has been  off Keppra for at least 1 year.  Restarted during this admission initially for concerns of subclinical seizures.  EEG was negative.  Patient now has intermittent somnolence and confusion which may be related to prolonged hospitalization but there may be some contribution from Keppra.  -Hold Keppra  -Monitor for seizure     Other problems  Sepsis, present on admission, now resolved  Hyponatremia, resolved  UTI  Nonischemic cardiomyopathy  Chronic systolic congestive heart failure  Paroxysmal atrial fibrillation  CKD stage III  Thrombocytosis     DVT Mechanical Prophylaxis:   SCDs,  Heparin       DVT Pharmacologic Prophylaxis   Medication    heparin (Porcine) 5000 UNIT/ML injection 5,000 Units               code status: dnr/Select   dispo: to be determined  malnutrition status at bottom of note         MDM .high FREYA COMBS MD  09/06/24

## 2024-09-06 NOTE — DIETARY NOTE
ADULT NUTRITION REASSESSMENT    Pt is at high nutrition risk.  Pt meets severe malnutrition criteria.      CRITERIA FOR MALNUTRITION DIAGNOSIS:  Criteria for severe malnutrition diagnosis: acute illness/injury related to wt loss greater than 7.5% in 3 months, energy intake less than 50% for greater than 5 days, body fat moderate depletion, and muscle mass moderate depletion.    RECOMMENDATIONS TO MD:  CPM.  Declined feeding tube this admit.    See Nutrition Intervention for oral nutritional supplement (ONS) specifics     ADMITTING DIAGNOSIS:  Urinary tract infection without hematuria, site unspecified [N39.0]  Anemia, unspecified type [D64.9]  Pressure injury of skin of sacral region, unspecified injury stage [L89.159]  PERTINENT PAST MEDICAL HISTORY:   Past Medical History:    Acute kidney insufficiency    Anemia    Arrhythmia    Back problem    CHF (congestive heart failure) (HCC)    CKD (chronic kidney disease) stage 3, GFR 30-59 ml/min (HCC)    Deep vein thrombosis (HCC)    on B.T for only a month, possibly Magen    Essential hypertension    High blood pressure    History of blood transfusion    Rheumatoid arthritis (HCC)    Seizure disorder (HCC)    Pt denied at screening call 6/28/24     PATIENT STATUS: Initial 08/14/24: Pt admit for urinary tract infection without hematuria, anemia and pressure injury or skin of sacral region. PMH sig for HTN, CHF, CKD and others noted above. Pt assessed due to screening at risk for decreased appetite, unintentional weight loss and pressure injury (PI) - unstageable and maceration to right buttock and stage 2 & shear to left buttock. Pt known to nutrition services from previous admissions, last seen 6/19/24 and provided with HF diet education.  Chart reviewed, pt admitted with c/o sacral wound x 3 weeks and dark/loose stools. Pt with recent admission (7/15-7/22) for acute on chronic congestive heart failure - treated with diuretics and dobutamine. General surgery consulted for  debridement.  POD#1 s/p sacral debridement. Discussion with RN, poor appetite. Intakes reviewed, 25-40% x 2 meals since admit (poor). Pt visited, resting with eyes closed but answering majority of questions. Pt reports poor appetite/intake but unable to report timeframe. Pt reports not eating. Pt notes used to drink Ensure but stopped due to going right through causing pain/difficulties prior to surgery. Pt reports weight is not good, usual body weight (UBW) 142# but unable to determine last time weighed this. Current weight 120#. EMR weight review, last known weight 112# 2 oz on 8/1/24. Per EMR weight review, pt noted weighing 129# 10 oz on 7/1/24 - 9.6# or 7.4% weight loss x 1 month (significant), 129# 13 oz on 5/16/24 - 9.8# or 7.6% weight loss x 3 months (significant), 149# 14 oz on 2/3/24 - 29.9# or 19.9% weight loss x 6 months (significant) and 141# 14 oz on 8/21/23 - 21.9# or 15.4% weight loss x 1 year (non-significant). Nutrition focused physical exam (NFPE) completed, see below for details. Encouraged small frequent meals with emphasis on high calorie and high protein and ONS to help maximize nutrition. +ONS  per discussion.    8/19 UPDATE: pt slow to respond but did awake when I directed a question to her, but then fell back asleep. Daughter reports that her dad can get the pt to drink some of the ensure enlive. Otherwise pt eating very little. Pt with high K+ today--not diet related due to limited intake, but did adjust diet to liberalize cardiac diet and added low K+.      08/23/24 UPDATE: GOC discussions on-going. Per MD documentation, family report the patient would not want a feeding tube. Pt was more alert/awake this AM however was given Norco due to increased pain - now is sleepy/drowsy and inappropriate for interview. Refusing lunch. Only taking a few sips of ONS daily. POD #10 s/p excision of cyst, sharp excisional debridement of buttock decubitus ulcer.   08/27/24 UPDATE:     Noted hospice  meeting tomorrow. Po intake remains poor overall with a few fair/good meals over past week. Pt having pain impacting appetite but palliative follows and pain meds adjusted to address. No feeding tube/family. Supplements provided but intake poor.   09/02/24 UPDATE: Family declined hospice. Also declining feeding tube. Awaiting insurance authorization for SNF placement. Pt continues to have intermittent somnolence and confusion. Pt lying in bed with DTR at bedside at time of visit. DTR with meal from home for pt. Per DTR, family bringing in ~1 meal daily with limited acceptance. Pt's spouse usually visits in the evening. Pt typically consumes 1-1.5 Ensure Enlive daily with encouragement of family. Pt ordered 1 meal/day over the past 3 days per kitchen record. Pt typically eats better when family is present.   09/06/24 UPDATE:   Continued poor po intake but pt is accepting some nutrition supplements. Extra bottles of supplements in room- will hold on sending future at this time. Palliative care consulted. No feeding tubes per family and pt.     FOOD/NUTRITION RELATED HISTORY:  Appetite:  decreased/poor appetite PTA   Intake:  Intake remains minimal  Intake Meeting Needs: No, but oral nutrition supplements (ONS) to maximize   Percent Meals Eaten (last 6 days)       Date/Time Percent Meals Eaten (%)    08/31/24 0900 0 %    08/31/24 1452 0 %    09/01/24 2100 0 %    09/03/24 0427 0 %    09/04/24 2200 100 %     Percent Meals Eaten (%): 1 ensure shake at 09/04/24 2200    09/05/24 0800 40 %     Percent Meals Eaten (%): Apple sauce at 09/05/24 0800        Food Allergies: No Known Food Allergies (NKFA)  Cultural/Ethnic/Mosque Preferences: Not Obtained    GASTROINTESTINAL: +BM last recorded medium, soft, brown stool x1 on 9/4 and abdomen soft, rounded, with active bowel sounds    MEDICATIONS: reviewed; Noted non-cardiac electrolyte replacement protocol ordered;   Continuous meds: (IVF)   sodium bicarbonate in D5W infusion 100  mEq (24 1223)   Scheduled meds:    vancomycin  15 mg/kg Intravenous Q36H    bumetanide  1 mg Intravenous BID (Diuretic)    potassium chloride  20 mEq Oral BID    pantoprazole  40 mg Intravenous Daily    sodium ferric gluconate  125 mg Intravenous Daily    vancomycin  125 mg Oral q12h    heparin  5,000 Units Subcutaneous Q8H BROOKLYNN    acetaminophen  650 mg Oral Q8H    sacubitril-valsartan  1 tablet Oral BID    metoprolol succinate  25 mg Oral Daily Beta Blocker    [Held by provider] midodrine  5 mg Oral TID    sodium hypochlorite   Topical BID    amiodarone  200 mg Oral Daily    folic acid  800 mcg Oral Daily    gabapentin  300 mg Oral TID   PRN meds: for pain and antinausea.     LABS: reviewed; A1c 5.9% on 22; Creat improved. Acidosis resolved on bicarb/IVF. POC B, 70, 70 today.   Recent Labs     24  0507 24  0943 24  1116 24  0448   GLU 65* 266* 102* 84   BUN 38* 35* 39* 37*   CREATSERUM 2.06* 1.86* 1.70* 1.47*   CA 8.5* 7.6* 8.4* 8.0*   MG 2.5  2.6  --  1.9 1.8    147* 144 145   K 4.8 4.8 3.7 3.6   * 122* 116* 116*   CO2 11.0* 12.0* 19.0* 21.0   PHOS 5.7*  --  3.8 3.7   OSMOCALC 307* 321* 308* 308*     NUTRITION RELATED PHYSICAL FINDINGS:  - Nutrition Focused Physical Exam (NFPE): moderate depletion body fat triceps region and moderate depletion muscle mass clavicle region unable to evaluate lower extremities at this time - largely bed-bound but will get up and move to a chair for part of the day most days  - Fluid Accumulation: Non-pitting edema RUE and right hand; +1 edema LUE, left hand and BL LE see RN documentation for details  - Skin Integrity: Unstageable wound to right buttocks s/p debridement on , stage 2 to sacrum -  see RN documentation for details    ANTHROPOMETRICS:  HT:  162.6 cm (5' 4\")  WT: 68 kg (149 lb 14.6 oz) - wt up 29 lbs from admission - likely fluid gains.   ADMISSION WT: 54.4 kg (120 lbs) - utilized for anthropometric assessment.    BMI: Body mass index is 20.58 kg/m².  BMI CLASSIFICATION: <22 considered underweight for advanced age  IBW: 120 lbs        100% IBW  Usual Body Wt: 142 lbs per pt but unable to determine timeframe      85% UBW    WEIGHT HISTORY: Per EMR weight review, pt noted weighing 149# February 2024 and 141# August 2023  Patient Weight(s) for the past 336 hrs:   Weight   09/04/24 0800 68 kg (149 lb 14.6 oz)   09/03/24 2138 65.1 kg (143 lb 8.3 oz)   09/02/24 0900 68 kg (150 lb)   09/02/24 0602 68.9 kg (152 lb)   09/01/24 0657 66 kg (145 lb 9.6 oz)   08/31/24 0533 60.4 kg (133 lb 3.2 oz)       Wt Readings from Last 10 Encounters:   09/04/24 68 kg (149 lb 14.6 oz)   08/01/24 50.8 kg (112 lb 1.6 oz)   07/22/24 52.3 kg (115 lb 3.2 oz)   07/09/24 56.6 kg (124 lb 12.8 oz)   06/28/24 61.7 kg (136 lb)   07/01/24 58.8 kg (129 lb 9.6 oz)   06/29/24 60.8 kg (134 lb)   06/26/24 63.5 kg (140 lb)   06/21/24 63.5 kg (140 lb)   06/13/24 62.1 kg (137 lb)     NUTRITION DIAGNOSIS/PROBLEM:   Severe Malnutrition related to Acute - Physiological causes resulting in anorexia or diminished intake as evidenced by wt loss greater than 7.5% in 3 months, energy intake less than 50% for greater than 5 days, body fat moderate depletion, and muscle mass moderate depletion.    NUTRITION DIAGNOSIS PROGRESS:  No Improvement (continue)-- intake remains poor    NUTRITION INTERVENTION:   NUTRITION PRESCRIPTION:   Estimated Nutrition needs: --dosing wt of 54.4 kg - wt taken on 8/12/2024  Calories: 2289-7327 calories/day (28-32 calories per kg Dosing wt)  Protein: 64-80 g protein/day (1.2-1.5 g protein/kg Dosing wt)    - Diet:       Procedures    Regular/General diet Sodium Restriction: 2 GM NA; Fluid Restriction: 1500 ml; Is Patient on Accuchecks? No   Most liberal diet in view of poor nutrition intake.     - RD Malnutrition Care Plan: Adjusted diet to least restrictive to maximize intake, Encouraged increased PO intake, Encouraged small frequent meals with  emphasis on high calorie/high protein, and Initiated ONS (oral nutritional supplements)  - Meals and snacks: Encouraged small frequent meals and Encouraged increased PO intake  - Medical Food Supplements: RD added Magic Cup (290 calories/ 9 g protein each) Daily Chocolate with dinner trays. Rational/use of oral supplements discussed.  - Vitamin and mineral supplements: folic acid and iron / MD  - Feeding assistance: meal set up and feed  - Nutrition education: Discussed importance of adequate energy and protein intake   - Coordination of nutrition care: collaboration with other providers - discussed @rounds.   - Discharge and transfer of nutrition care to new setting or provider: monitor plans - SNF placement     MONITOR AND EVALUATE/NUTRITION GOALS:  - Food and Nutrient Intake:      Monitor: adequacy of PO intake and adequacy of supplement intake  - Anthropometric Measurement:    Monitor weight  - Nutrition Goals:      PO and supplement greater than 75% of needs, labs within acceptable limits, euglycemia, support wound healing, and support normal BM. Supportive nutrition care.     DIETITIAN FOLLOW UP: RD to follow    Melody Macedo RD, LDN, CNSC ([id number])

## 2024-09-06 NOTE — PROGRESS NOTES
[facility]  Nephrology Daily Progress Note    Margaret Silverio  [id number]  79 year old      HPI:   Margaret Silverio is a 79 year old female.  Awake and talking but remains very weak. Not eating much today as per family.  On 4 L       ROS:     Constitutional:  Weak  Endocrine:  Negative for abnormal sleep patterns, increased activity, polydipsia and polyphagia  Cardiovascular:  Negative for cool extremity and irregular heartbeat/palpitations  Gastrointestinal:  Negative for abdominal pain, constipation, decreased appetite, diarrhea and vomiting  Genitourinary:  Negative for dysuria and hematuria  Hema/Lymph:  Negative for easy bleeding and easy bruising  Integumentary:  Negative for pruritus and rash  Musculoskeletal:  Negative for bone/joint symptoms  Neurological:  Negative for gait disturbance  Psychiatric:  Negative for inappropriate interaction and psychiatric symptoms  Respiratory:  Negative for cough, dyspnea and wheezing      PHYSICAL EXAM:   Temp:  [96.9 °F (36.1 °C)-98.3 °F (36.8 °C)] 97 °F (36.1 °C)  Pulse:  [58-64] 64  Resp:  [10-18] 10  BP: ()/(60-87) 108/60  SpO2:  [91 %-100 %] 93 %  Patient Weight for the past 72 hrs:   Weight   09/03/24 2138 143 lb 8.3 oz (65.1 kg)   09/04/24 0800 149 lb 14.6 oz (68 kg)       Constitutional: appears well hydrated alert and responsive no acute distress noted  Neck/Thyroid: neck is supple without adenopathy  Lymphatic: no abnormal cervical, supraclavicular or axillary adenopathy is noted  Respiratory: normal to inspection lungs are clear to auscultation bilaterally normal respiratory effort  Cardiovascular: regular rate and rhythm no murmurs, gallups, or rubs  Abdomen: soft non-tender non-distended no organomegaly noted no masses  Musculoskeletal: full ROM all extremities good strength  no deformities  Extremities: 1-2 + edema.    Neurological: exam appropriate for age reflexes and motor skills appropriate for age    Labs:  Lab Results    Component Value Date    WBC 20.7 09/06/2024    HGB 8.4 09/06/2024    HCT 26.2 09/06/2024    .0 09/06/2024    CREATSERUM 1.47 09/06/2024    BUN 37 09/06/2024     09/06/2024    K 3.6 09/06/2024     09/06/2024    CO2 21.0 09/06/2024    GLU 84 09/06/2024    CA 8.0 09/06/2024    ALB 2.4 09/06/2024    ALKPHO 219 09/06/2024    BILT 0.8 09/06/2024    TP 5.5 09/06/2024     09/06/2024    ALT 99 09/06/2024    MG 1.8 09/06/2024    PHOS 3.7 09/06/2024     Recent Labs   Lab 09/04/24  0507 09/05/24  1116 09/06/24  0448   WBC 35.7* 28.7* 20.7*   HGB 7.8* 8.4* 8.4*   HCT 24.3* 26.7* 26.2*   .0 210.0 193.0     Recent Labs   Lab 09/04/24  0507 09/04/24  0943 09/05/24  1116 09/06/24  0448   GLU 65* 266* 102* 84   BUN 38* 35* 39* 37*   CREATSERUM 2.06* 1.86* 1.70* 1.47*   CA 8.5* 7.6* 8.4* 8.0*   ALB 2.9* 2.8* 2.7* 2.4*    147* 144 145   K 4.8 4.8 3.7 3.6   * 122* 116* 116*   CO2 11.0* 12.0* 19.0* 21.0   ALKPHO 231* 212* 238* 219*   AST 92* 94* 174* 236*   ALT 35 33 67* 99*   BILT 1.1 1.0 0.9 0.8   TP 6.5 6.0 6.1 5.5*   PHOS 5.7*  --  3.8 3.7       Imaging  XR CHEST AP PORTABLE  (CPT=71045)    Result Date: 9/5/2024  CONCLUSION:   No significant change from 09/03/2024.  Moderate cardiomegaly with interstitial pulmonary edema.  Bilateral perihilar and lower lobe alveolar opacity, likely alveolar edema with other superimposed infiltrate less likely.  Unchanged small left greater than right pleural effusions.  Again, right effusion may be loculated       Dictated by (CST): Nadine Blankenship MD on 9/05/2024 at 7:20 AM     Finalized by (CST): Nadine Blankenship MD on 9/05/2024 at 7:22 AM          CT ABDOMEN+PELVIS(CPT=74176)    Result Date: 9/4/2024  CONCLUSION:  1. Limited non-contrast examination, which is further confounded by patient positioning.  2. Long segment/diffuse colonic wall thickening with pericolonic inflammatory stranding.  Findings raise suspicion for infectious or inflammatory  colitis.  Please ensure that the patient has recent Clostridium difficile testing.  No definite free intraperitoneal air, well-defined/drainable intra-abdominal collection, pneumatosis intestinalis, or portal venous gas.  No evidence of bowel obstruction. 3. Large sacral decubitus ulcer again identified.  There is edema and phlegmon surrounding the ulcer, but no well-defined/drainable collection to suggest abscess by noncontrast CT.  No definite CT findings of osteomyelitis at the adjacent sacrum or coccyx. 4. Congestive failure and/or fluid overload with loculated moderate left and small to moderate right pleural effusions, severe cardiomegaly, small volume intra-abdominal ascites, and severe anasarca. 5. Coronary and peripheral atherosclerosis.  Partially imaged AICD electrodes as well as mitral valve repair. 6. Aforementioned loculated right pleural effusion demonstrates mild peripheral thickening; superinfection/empyema cannot be excluded.  Dependent airspace disease in the left greater than right lower lobes is favored to represent compressive atelectasis,  but coexistent infection/pneumonia should be excluded on the basis of clinical parameters. 7. Sludge or vicarious excretion of contrast in the gallbladder.  No evidence of acute cholecystitis. 8. Stable morphologic changes of chronic pancreatitis.  9. Lesser incidental findings as above.   Dictated by (CST): Aleks Diana MD on 9/04/2024 at 2:34 PM     Finalized by (CST): Aleks Diana MD on 9/04/2024 at 2:48 PM          CT BRAIN OR HEAD (CPT=70450)    Result Date: 9/4/2024  CONCLUSION:  1. No acute intracranial process by noncontrast CT technique.  2. Senescent changes of parenchymal volume loss with sequela of chronic microvascular ischemic disease. There is also large vessel atherosclerosis.   3. An empty sella is seen with an expansile appearance of the sella turcica, which may reflect normal variation or could be sequela of benign idiopathic  intracranial hypertension.  4. Lesser incidental findings as above.   Dictated by (CST): Corey Posey MD on 9/04/2024 at 11:19 AM     Finalized by (CST): Corey Posey MD on 9/04/2024 at 11:22 AM             Medications:    Current Facility-Administered Medications:     vancomycin (Vancocin) 1,000 mg in sodium chloride 0.9% 250 mL IVPB-ADDV, 15 mg/kg, Intravenous, Q36H    potassium chloride (Klor-Con) 20 MEQ oral powder 40 mEq, 40 mEq, Oral, Once    bumetanide (Bumex) 0.25 MG/ML injection 1 mg, 1 mg, Intravenous, BID (Diuretic)    potassium chloride (Klor-Con) 20 MEQ oral powder 20 mEq, 20 mEq, Oral, BID    sodium chloride 0.9% infusion, , Intravenous, Continuous    pantoprazole (Protonix) 40 mg in sodium chloride 0.9% PF 10 mL IV push, 40 mg, Intravenous, Daily    sodium bicarbonate 100 mEq in dextrose 5% 1,100 mL infusion, 100 mEq, Intravenous, Continuous    sodium ferric gluconate (Ferrlecit) 125 mg in sodium chloride 0.9% 100mL IVPB premix, 125 mg, Intravenous, Daily    loperamide (Imodium) cap 2 mg, 2 mg, Oral, QID PRN    HYDROcodone-acetaminophen (Norco)  MG per tab 2 tablet, 2 tablet, Oral, Q4H PRN    vancomycin (Vancocin) cap 125 mg, 125 mg, Oral, q12h    HYDROcodone-acetaminophen (Norco)  MG per tab 1 tablet, 1 tablet, Oral, Q4H PRN    [Held by provider] levETIRAcetam (Keppra) 500 mg/5mL injection 500 mg, 500 mg, Intravenous, Daily    heparin (Porcine) 5000 UNIT/ML injection 5,000 Units, 5,000 Units, Subcutaneous, Q8H [place]    HYDROmorphone (Dilaudid) 1 MG/ML injection 0.2 mg, 0.2 mg, Intravenous, Q4H PRN **OR** [DISCONTINUED] HYDROmorphone (Dilaudid) 1 MG/ML injection 0.4 mg, 0.4 mg, Intravenous, Q3H PRN **OR** [DISCONTINUED] HYDROmorphone (Dilaudid) 1 MG/ML injection 0.8 mg, 0.8 mg, Intravenous, Q3H PRN    acetaminophen (Tylenol) 160 MG/5ML oral liquid 650 mg, 650 mg, Oral, Q8H    senna-docusate (Senokot-S) 8.6-50 MG per tab 2 tablet, 2 tablet, Oral, Daily PRN    polyethylene glycol (PEG  3350) (Miralax) 17 g oral packet 17 g, 17 g, Oral, Daily PRN    glucose (Dex4) 15 GM/59ML oral liquid 15 g, 15 g, Oral, Q15 Min PRN **OR** glucose (Glutose) 40% oral gel 15 g, 15 g, Oral, Q15 Min PRN **OR** glucose-vitamin C (Dex-4) chewable tab 4 tablet, 4 tablet, Oral, Q15 Min PRN **OR** dextrose 50% injection 50 mL, 50 mL, Intravenous, Q15 Min PRN **OR** glucose (Dex4) 15 GM/59ML oral liquid 30 g, 30 g, Oral, Q15 Min PRN **OR** glucose (Glutose) 40% oral gel 30 g, 30 g, Oral, Q15 Min PRN **OR** glucose-vitamin C (Dex-4) chewable tab 8 tablet, 8 tablet, Oral, Q15 Min PRN    traMADol (Ultram) tab 50 mg, 50 mg, Oral, Q12H PRN    sacubitril-valsartan (Entresto) 24-26 MG per tab 1 tablet, 1 tablet, Oral, BID    metoprolol succinate ER (Toprol XL) 24 hr tab 25 mg, 25 mg, Oral, Daily Beta Blocker    [Held by provider] midodrine (ProAmatine) tab 5 mg, 5 mg, Oral, TID    ondansetron (Zofran) 4 MG/2ML injection 4 mg, 4 mg, Intravenous, Q6H PRN    metoclopramide (Reglan) 5 mg/mL injection 5 mg, 5 mg, Intravenous, Q8H PRN    sodium hypochlorite (Dakin's) 0.125 % external solution, , Topical, BID    acetaminophen (Tylenol Extra Strength) tab 500 mg, 500 mg, Oral, Q4H PRN    amiodarone (Pacerone) tab 200 mg, 200 mg, Oral, Daily    folic acid (Folvite) tab 800 mcg, 800 mcg, Oral, Daily    gabapentin (Neurontin) cap 300 mg, 300 mg, Oral, TID    [Held by provider] isosorbide dinitrate (Isordil) tab 20 mg, 20 mg, Oral, TID (Nitrates)    Allergies:  Allergies   Allergen Reactions    Lisinopril Coughing       Input/Output:    Intake/Output Summary (Last 24 hours) at 9/6/2024 1133  Last data filed at 9/6/2024 0600  Gross per 24 hour   Intake 3200.4 ml   Output 1125 ml   Net 2075.4 ml          ASSESSMENT/PLAN:   Assessment   Patient Active Problem List   Diagnosis    Altered mental status    Altered mental status, unspecified altered mental status type    Seizure (HCC)    Lactic acidosis    Leukocytosis    Acute GI bleeding    GEORGE  (acute kidney injury) (HCC)    Thrombocytopenia (HCC)    Metabolic acidosis    Atrial fibrillation, chronic (HCC)    Sepsis (HCC)    Sepsis due to Escherichia coli (HCC)    ABLA (acute blood loss anemia)    Melena    Acute chest pain    Pleural effusion    ACC/AHA stage B systolic heart failure (HCC)    Congestive heart failure (HCC)    Coronary arteriosclerosis    Essential (primary) hypertension    Mitral valve stenosis    Rheumatoid arthritis (HCC)    Stage 3 chronic kidney disease (HCC)    Atrial flutter (HCC)    Acute on chronic HFrEF (heart failure with reduced ejection fraction) (HCC)    Dyspnea, unspecified type    Anticoagulated    Coagulopathy (HCC)    SIRS (systemic inflammatory response syndrome) (HCC)    Anemia due to stage 3 chronic kidney disease (HCC)    Anemia due to GI blood loss    Anemia of chronic disease    Lymphopenia    Pancytopenia (HCC)    Acute deep vein thrombosis (DVT) of left upper extremity (HCC)    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HCC)    Cardiogenic shock (HCC)    HFrEF (heart failure with reduced ejection fraction) (HCC)    Anemia    Acute kidney injury (HCC)    Iron deficiency anemia, unspecified iron deficiency anemia type    Weakness generalized    Acute kidney insufficiency    Hyperkalemia    Cervical pain    Cervical radiculopathy    Pleural effusion, bilateral    Cognitive communication deficit    Difficulty in walking, not elsewhere classified    Dysphagia, oral phase    Gastro-esophageal reflux disease without esophagitis    Cardiomyopathy, unspecified (HCC)    Major depressive disorder, single episode, unspecified    Severe tricuspid regurgitation    S/P MVR (mitral valve replacement)    PAF (paroxysmal atrial fibrillation) (HCC)    ICD (implantable cardioverter-defibrillator), dual, in situ    CKD (chronic kidney disease) stage 4, GFR 15-29 ml/min (HCC)    Pressure injury of skin of sacral region, unspecified injury stage    Urinary tract infection without hematuria, site  unspecified    Anemia, unspecified type    Encephalopathy    Goals of care, counseling/discussion    Palliative care encounter    Pain     UO 1125 ml and renal function better with creatinine down to 1.47.  Pt volume overloaded.  EF 40-45%.  Still on dextrose and bicarb drip for hypoglycemia.  Will increase Bumex to 1 mg bid.  Replace K,  Hb 8.4.  On ferrlecit. Overall prognosis guarded.  Labs and CXR in am.  Discussed with family at bedside.            9/6/2024  Eyad Arvizu MD

## 2024-09-06 NOTE — PROGRESS NOTES
[facility] Dosing Service      Follow Up Pharmacokinetic Consult for Vancomycin Dosing     Margaret Silverio is a 79 year old female who is receiving vancomycin therapy for sepsis. Patient is on day 3 of vancomycin and is currently receiving 1000 mg IV every 24 hours. The current treatment and monitoring approach is non-AUC strategy.        Weight and Temperature:    Wt Readings from Last 1 Encounters:   24 68 kg (149 lb 14.6 oz)         Temp Readings from Last 1 Encounters:   24 96.9 °F (36.1 °C) (Temporal)      Labs:   Recent Labs   Lab 24  0507 24  0943 24  1116   CREATSERUM 2.06* 1.86* 1.70*      Estimated Creatinine Clearance: 23.6 mL/min (A) (based on SCr of 1.7 mg/dL (H)).     Recent Labs   Lab 245 24  0507 24  1116   WBC 33.6* 35.7* 28.7*        Vancomycin Levels:  Lab Results   Component Value Date/Time    VANCT 16.2 2024 01:28 AM    VANCT 6.8 (L) 2024 06:11 AM       Corresponding 24 h-AUC: N/A     The Pharmacokinetic Target is:    Trough/random 10-15 mg/L    Renal Dosing Considerations:    GEORGE/ARF     Assessment/Plan:   Maintenance Regimen: Adjust vancomycin to 1000 mg IV every 36 hours    Monitorin) Plan for vancomycin trough to be obtained prior to 3rd dose    2) Pharmacy will order SCr as clinically indicated to assess renal function.    3) Pharmacy will monitor for toxicity and efficacy, adjust vancomycin dose and/or frequency, and order vancomycin levels as appropriate per the Pharmacy and Therapeutics Committee approved protocol until discontinuation of the medication.       We appreciate the opportunity to assist in the care of this patient.     Fran Rhodes, PharmD  2024  2:13 AM  [place]  Pharmacy Extension: [phone]

## 2024-09-06 NOTE — PLAN OF CARE
Patient is A&Ox2-3, on 4L NC, PRN dilaudid and norco given, CHG bath done. Bed is locked and in lowest position. Call light is within reach.  Problem: Patient Centered Care  Goal: Patient preferences are identified and integrated in the patient's plan of care  Description: Interventions:  - What would you like us to know as we care for you?   - Provide timely, complete, and accurate information to patient/family  - Incorporate patient and family knowledge, values, beliefs, and cultural backgrounds into the planning and delivery of care  - Encourage patient/family to participate in care and decision-making at the level they choose  - Honor patient and family perspectives and choices  Outcome: Progressing     Problem: Patient/Family Goals  Goal: Patient/Family Long Term Goal  Description: Patient's Long Term Goal: to go home    Interventions:  - follow MD order  - See additional Care Plan goals for specific interventions  Outcome: Progressing  Goal: Patient/Family Short Term Goal  Description: Patient's Short Term Goal: to be pain free    Interventions:   - follow medication regimen  - See additional Care Plan goals for specific interventions  Outcome: Progressing     Problem: PAIN - ADULT  Goal: Verbalizes/displays adequate comfort level or patient's stated pain goal  Description: INTERVENTIONS:  - Encourage pt to monitor pain and request assistance  - Assess pain using appropriate pain scale  - Administer analgesics based on type and severity of pain and evaluate response  - Implement non-pharmacological measures as appropriate and evaluate response  - Consider cultural and social influences on pain and pain management  - Manage/alleviate anxiety  - Utilize distraction and/or relaxation techniques  - Monitor for opioid side effects  - Notify MD/LIP if interventions unsuccessful or patient reports new pain  - Anticipate increased pain with activity and pre-medicate as appropriate  Outcome: Progressing     Problem:  RISK FOR INFECTION - ADULT  Goal: Absence of fever/infection during anticipated neutropenic period  Description: INTERVENTIONS  - Monitor WBC  - Administer growth factors as ordered  - Implement neutropenic guidelines  Outcome: Progressing     Problem: SAFETY ADULT - FALL  Goal: Free from fall injury  Description: INTERVENTIONS:  - Assess pt frequently for physical needs  - Identify cognitive and physical deficits and behaviors that affect risk of falls.  - [place] fall precautions as indicated by assessment.  - Educate pt/family on patient safety including physical limitations  - Instruct pt to call for assistance with activity based on assessment  - Modify environment to reduce risk of injury  - Provide assistive devices as appropriate  - Consider OT/PT consult to assist with strengthening/mobility  - Encourage toileting schedule  Outcome: Progressing     Problem: SKIN/TISSUE INTEGRITY - ADULT  Goal: Skin integrity remains intact  Description: INTERVENTIONS  - Assess and document risk factors for pressure ulcer development  - Assess and document skin integrity  - Monitor for areas of redness and/or skin breakdown  - Initiate interventions, skin care algorithm/standards of care as needed  Outcome: Progressing  Goal: Incision(s), wounds(s) or drain site(s) healing without S/S of infection  Description: INTERVENTIONS:  - Assess and document risk factors for pressure ulcer development  - Assess and document skin integrity  - Assess and document dressing/incision, wound bed, drain sites and surrounding tissue  - Implement wound care per orders  - Initiate isolation precautions as appropriate  - Initiate Pressure Ulcer prevention bundle as indicated  Outcome: Progressing  Goal: Oral mucous membranes remain intact  Description: INTERVENTIONS  - Assess oral mucosa and hygiene practices  - Implement preventative oral hygiene regimen  - Implement oral medicated treatments as ordered  Outcome: Progressing     Problem:  RESPIRATORY - ADULT  Goal: Achieves optimal ventilation and oxygenation  Description: INTERVENTIONS:  - Assess for changes in respiratory status  - Assess for changes in mentation and behavior  - Position to facilitate oxygenation and minimize respiratory effort  - Oxygen supplementation based on oxygen saturation or ABGs  - Provide Smoking Cessation handout, if applicable  - Encourage broncho-pulmonary hygiene including cough, deep breathe, Incentive Spirometry  - Assess the need for suctioning and perform as needed  - Assess and instruct to report SOB or any respiratory difficulty  - Respiratory Therapy support as indicated  - Manage/alleviate anxiety  - Monitor for signs/symptoms of CO2 retention  Outcome: Progressing     Problem: CARDIOVASCULAR - ADULT  Goal: Maintains optimal cardiac output and hemodynamic stability  Description: INTERVENTIONS:  - Monitor vital signs, rhythm, and trends  - Monitor for bleeding, hypotension and signs of decreased cardiac output  - Evaluate effectiveness of vasoactive medications to optimize hemodynamic stability  - Monitor arterial and/or venous puncture sites for bleeding and/or hematoma  - Assess quality of pulses, skin color and temperature  - Assess for signs of decreased coronary artery perfusion - ex. Angina  - Evaluate fluid balance, assess for edema, trend weights  Outcome: Progressing  Goal: Absence of cardiac arrhythmias or at baseline  Description: INTERVENTIONS:  - Continuous cardiac monitoring, monitor vital signs, obtain 12 lead EKG if indicated  - Evaluate effectiveness of antiarrhythmic and heart rate control medications as ordered  - Initiate emergency measures for life threatening arrhythmias  - Monitor electrolytes and administer replacement therapy as ordered  Outcome: Progressing     Problem: METABOLIC/FLUID AND ELECTROLYTES - ADULT  Goal: Glucose maintained within prescribed range  Description: INTERVENTIONS:  - Monitor Blood Glucose as ordered  - Assess  for signs and symptoms of hyperglycemia and hypoglycemia  - Administer ordered medications to maintain glucose within target range  - Assess barriers to adequate nutritional intake and initiate nutrition consult as needed  - Instruct patient on self management of diabetes  Outcome: Progressing  Goal: Electrolytes maintained within normal limits  Description: INTERVENTIONS:  - Monitor labs and rhythm and assess patient for signs and symptoms of electrolyte imbalances  - Administer electrolyte replacement as ordered  - Monitor response to electrolyte replacements, including rhythm and repeat lab results as appropriate  - Fluid restriction as ordered  - Instruct patient on fluid and nutrition restrictions as appropriate  Outcome: Progressing  Goal: Hemodynamic stability and optimal renal function maintained  Description: INTERVENTIONS:  - Monitor labs and assess for signs and symptoms of volume excess or deficit  - Monitor intake, output and patient weight  - Monitor urine specific gravity, serum osmolarity and serum sodium as indicated or ordered  - Monitor response to interventions for patient's volume status, including labs, urine output, blood pressure (other measures as available)  - Encourage oral intake as appropriate  - Instruct patient on fluid and nutrition restrictions as appropriate  Outcome: Progressing     Problem: Delirium  Goal: Minimize duration of delirium  Description: Interventions:  - Encourage use of hearing aids, eye glasses  - Promote highest level of mobility daily  - Provide frequent reorientation  - Promote wakefulness i.e. lights on, blinds open  - Promote sleep, encourage patient's normal rest cycle i.e. lights off, TV off, minimize noise and interruptions  - Encourage family to assist in orientation and promotion of home routines  Outcome: Progressing     Problem: Diabetes/Glucose Control  Goal: Glucose maintained within prescribed range  Description: INTERVENTIONS:  - Monitor Blood  Glucose as ordered  - Assess for signs and symptoms of hyperglycemia and hypoglycemia  - Administer ordered medications to maintain glucose within target range  - Assess barriers to adequate nutritional intake and initiate nutrition consult as needed  - Instruct patient on self management of diabetes  Outcome: Progressing

## 2024-09-06 NOTE — PROGRESS NOTES
[facility] ID PROGRESS NOTE    Margaret Silverio Patient Status:  Inpatient    3/14/1945 MRN [id number]   Location [facility] 2W/SW Attending Veto Zhang MD   Hosp Day # 25 PCP Johnathon Rodriguez, DO     SUBJECTIVE  ROS limited. On 4L NC. Daughter at bedside reports very poor appetite. No BM yesterday.    ASSESSMENT:     Antibiotics: Vancomycin, OVP (IV ceftriaxone -, meropenem     # Acute sepsis with lactic acidosis with hypoxia   -CT A/P with infectious/inflammatory colitis ?ischemic, loculated R pleural effusion  # GEORGE  # Staph epidermidis bacteremia in 1/2 sets on 24 - likely contaminant               -Has bioprosthetic MVR and L chest PPM   -TTE without vegetation  # Leukocytosis - suspect reactive; r/o bacteremia; repeat cx NGTD  -Also got 1 dose of solumedrol  # GEORGE on CKD  # Anemia s/p transfusion      # Sacral wound stage 2 s/p OR debridement 24 - cx E.coli, anaerobes - currently no signs of infection     # Asymptomatic bacteruria - E. coli  # Intermittent AMS    # RA, bedbound, previously on MTX and prednisone               - received prednisone at last discharge; currently off related to limited PO     PLAN:  -  Continue on vancomycin as well as OVP, day 4.  -  Monitor R pleural effusion.   -  Follow fever curve, wbc.   -  Reviewed labs, micro, imaging reports.  -  Case d/w patient, RN.     History of Present Illness:  79-year-old female with a history of severe nonischemic cardiomyopathy with multiple admissions for heart failure, A-fib, CKD, severe RA with multiple joints involved was recently discharged about 1 month prior.  During that admission had a large left pleural effusion on dobutamine, Bumex, thoracentesis, left and right heart cath showing normal coronaries but severe volume overload.  Also had GEORGE and leukopenia.  Now admitted on  with painful decubitus ulcer.  Patient is bedbound with severe RA on methotrexate and  prednisone.  Afebrile, 2 L nasal cannula.  Seen by general surgery and taken the OR on 8/13 status post excision of cyst and sharp debridement of the decubitus ulcer.  Cultures with E. coli and anaerobes.  IV ceftriaxone for sacral wound and UTI completed 8/21.  Now with fever of 100.2 on 8/22 with increased WBC.  Blood culture sent with staph not aureus in 1 out of 2 sets.  Chest x-ray with no new focal opacity with persistent edema noted.  Goals of care discussion ongoing related to poor appetite, pain control.     OBJECTIVE  /60   Pulse 64   Temp 97 °F (36.1 °C) (Temporal)   Resp 10   Wt 149 lb 14.6 oz (68 kg)   SpO2 93%   BMI 25.73 kg/m²     Gen:   Awake, in bed  HEENT:  EOMI, neck supple  CV/lungs:  RRR, lungs CTAB  Abdom:  Soft, mild periumbilical TTP  Skin/extrem:  No rashes, no c/c/e, hand contractures noted  :   Purewick+  Lines:  PIV+    Laboratory Data: Reviewed     Microbiology: Reviewed     Radiology: Reviewed    [facility]  [phone]    Statement Selected

## 2024-09-06 NOTE — PROGRESS NOTES
Daily Pulmonary/ICU/Critical Care Progress Note          SUBJECTIVE:  More awake and alert. Denies complaints.         ALLERGIES:  Allergies   Allergen Reactions    Lisinopril Coughing         MEDS:  Home Medications:  Outpatient Medications Marked as Taking for the 8/12/24 encounter (Hospital Encounter)   Medication Sig Dispense Refill    bumetanide 1 MG Oral Tab Take 1 tablet (1 mg total) by mouth daily. 30 tablet 2    carvedilol 6.25 MG Oral Tab Take 0.5 tablets (3.125 mg total) by mouth 2 (two) times daily with meals. 30 tablet 2    sennosides (SENNA-TIME) 8.6 MG Oral Tab senna 8.6 mg tablet, [RxNorm: 703053]      cyclobenzaprine 10 MG Oral Tab Take 1 tablet (10 mg total) by mouth nightly. 30 tablet 1    fentaNYL 12 MCG/HR Transdermal Patch 72 Hr Place 1 patch onto the skin every third day. (Patient taking differently: Place 1 patch onto the skin every third day. Placed 8/10 left arm) 10 patch 0    midodrine 5 MG Oral Tab Take 1 tablet (5 mg total) by mouth 2 (two) times daily before meals. 60 tablet 0    senna-docusate 8.6-50 MG Oral Tab Take 2 tablets by mouth daily. 30 tablet 0    isosorbide dinitrate 20 MG Oral Tab Take 1 tablet (20 mg total) by mouth TID (Nitrates). 90 tablet 3    gabapentin 300 MG Oral Cap Take 1 capsule (300 mg total) by mouth 3 (three) times daily. 90 capsule 0    dapagliflozin (FARXIGA) 10 MG Oral Tab Take 1 tablet (10 mg total) by mouth daily. 30 tablet 5    spironolactone 25 MG Oral Tab       alendronate 70 MG Oral Tab Take 1 tablet (70 mg total) by mouth once a week. (Patient taking differently: Take 1 tablet (70 mg total) by mouth once a week. Every Tuesday) 12 tablet 3    methotrexate 2.5 MG Oral Tab TAKE 6 TABLETS BY MOUTH 1 TIME A WEEK 77 tablet 0    lidocaine-menthol 4-1 % External Patch Place 1 patch onto the skin daily. 30 patch 0    PANTOPRAZOLE 40 MG Oral Tab EC TAKE 1 TABLET(40 MG) BY MOUTH TWICE DAILY BEFORE MEALS 180 tablet 0    SACUBITRIL-VALSARTAN 49-51 MG Oral Tab Take  1 tablet by mouth 2 (two) times daily. 60 tablet 1    folic acid 800 MCG Oral Tab Take 1 tablet (800 mcg total) by mouth daily. 90 tablet 0    amiodarone 200 MG Oral Tab Take 1 tablet (200 mg total) by mouth daily.      ferrous sulfate 325 (65 FE) MG Oral Tab EC Take 1 tablet (325 mg total) by mouth daily with breakfast.       Scheduled Medication:   vancomycin  15 mg/kg Intravenous Q36H    potassium chloride  20 mEq Oral BID    pantoprazole  40 mg Intravenous Daily    bumetanide  1 mg Intravenous Daily    sodium ferric gluconate  125 mg Intravenous Daily    vancomycin  125 mg Oral q12h    [Held by provider] levETIRAcetam  500 mg Intravenous Daily    heparin  5,000 Units Subcutaneous Q8H BROOKLYNN    acetaminophen  650 mg Oral Q8H    sacubitril-valsartan  1 tablet Oral BID    metoprolol succinate  25 mg Oral Daily Beta Blocker    [Held by provider] midodrine  5 mg Oral TID    sodium hypochlorite   Topical BID    amiodarone  200 mg Oral Daily    folic acid  800 mcg Oral Daily    gabapentin  300 mg Oral TID    [Held by provider] isosorbide dinitrate  20 mg Oral TID (Nitrates)     Continuous Infusing Medication:   sodium chloride 100 mL/hr at 09/05/24 1845    sodium bicarbonate in D5W infusion 100 mEq (09/04/24 1223)     PRN Medications:    loperamide    HYDROcodone-acetaminophen    HYDROcodone-acetaminophen    HYDROmorphone **OR** [DISCONTINUED] HYDROmorphone **OR** [DISCONTINUED] HYDROmorphone    senna-docusate    polyethylene glycol (PEG 3350)    glucose **OR** glucose **OR** glucose-vitamin C **OR** dextrose **OR** glucose **OR** glucose **OR** glucose-vitamin C    traMADol    ondansetron    metoclopramide    acetaminophen       PHYSICAL EXAM:  /60   Pulse 64   Temp 97 °F (36.1 °C) (Temporal)   Resp 10   Wt 149 lb 14.6 oz (68 kg)   SpO2 93%   BMI 25.73 kg/m²      CONSTITUTIONAL: alert, no apparent distress  HEENT: atraumatic normocephalic  MOUTH: mucous membranes are moist. No OP exudates  NECK/THROAT: no  JVD. Trachea midline. No obvious thyromegaly  LUNG: clear upper with diminished at bases. No wheezing. Chest symmetric with respiratory motion  HEART: regular rate and rhythm, + m  ABD: soft non tender. + bowel sounds. No organomegaly noted  EXT: no clubbing, cyanosis. + b/l LE edema noted      IMAGES:   Reviewed in EMR      LABS:  Recent Labs   Lab 09/03/24 2125 09/04/24  0507 09/05/24  1116 09/06/24  0448   RBC 2.89* 2.48* 2.76* 2.70*   HGB 8.8* 7.8* 8.4* 8.4*   HCT 30.1* 24.3* 26.7* 26.2*   .2* 98.0 96.7 97.0   MCH 30.4 31.5 30.4 31.1   MCHC 29.2* 32.1 31.5 32.1   RDW 21.9* 21.5* 22.5* 23.0*   NEPRELIM 24.54* 26.29* 23.31*  --    WBC 33.6* 35.7* 28.7* 20.7*   .0 203.0 210.0 193.0       Recent Labs   Lab 09/04/24  0943 09/05/24  1116 09/06/24  0448   * 102* 84   BUN 35* 39* 37*   CREATSERUM 1.86* 1.70* 1.47*   EGFRCR 27* 31* 36*   CA 7.6* 8.4* 8.0*   ALB 2.8* 2.7* 2.4*   * 144 145   K 4.8 3.7 3.6   * 116* 116*   CO2 12.0* 19.0* 21.0   ALKPHO 212* 238* 219*   AST 94* 174* 236*   ALT 33 67* 99*   BILT 1.0 0.9 0.8   TP 6.0 6.1 5.5*       ASSESSMENT/PLAN:  1. Severe sepsis  Possible intra-abdominal source vs pulm source. Ct abd/pelvis reviewed   Infected decubitus  Patient already on broad-spectrum antibiotics  ID following  If needs pleural effusion drained (thora vs chest tube) then would recommend consulting IR as would be difficult to perform at bedside  WBC improving      2. Infected sacral decubitus ulcer  Presented on admission  S/p surgical debridement  ID and surgery following  Wound care following     3. Lactic and metabolic acidosis  With underlying acute on chronic kidney injury  Started on bicarb drip and renal following/managing  AM labs improved     4. Severe nonischemic cardiomyopathy with severe pulmonary hypertension  Significant cardiomegaly on CXR with chronic basilar effusion  S/p right thoracentesis 7/12/2024 negative cytology and culture likely secondary to CHF  and CKD     5. Acute toxic and metabolic encephalopathy  Unresponsive has resolved  Head CT negative  Supportive care     6. Severe rheumatoid arthritis with significant joint deformities  Bedbound with chronic sacral decub     7. Acute on chronic respiratory failure with hypoxia  O2 therapy     8. DVT prophylaxis  Heparin subcu     9. DNAR/select  Prognosis is guarded and further setting of limited appropriate  Consult palliative care    IF remains stable, can consider transfer out of ICU later today.      Thank you for the opportunity to care for Margaret Massey DO, MPH  Pulmonary Critical Care Medicine  [facility]

## 2024-09-06 NOTE — PLAN OF CARE
Pt Lethargic but arousable on 3L O2. Able to take medication. Very weak. Family updated on care. Okay to transfer.   Problem: Patient Centered Care  Goal: Patient preferences are identified and integrated in the patient's plan of care  Description: Interventions:  - What would you like us to know as we care for you?   - Provide timely, complete, and accurate information to patient/family  - Incorporate patient and family knowledge, values, beliefs, and cultural backgrounds into the planning and delivery of care  - Encourage patient/family to participate in care and decision-making at the level they choose  - Honor patient and family perspectives and choices  9/6/2024 1823 by Rena Samayoa RN  Outcome: Progressing  9/6/2024 1601 by Rena Samayoa RN  Outcome: Progressing     Problem: Patient/Family Goals  Goal: Patient/Family Long Term Goal  Description: Patient's Long Term Goal:     Interventions:  -   - See additional Care Plan goals for specific interventions  9/6/2024 1823 by Rena Samayoa RN  Outcome: Progressing  9/6/2024 1601 by Rena Samayoa RN  Outcome: Progressing  Goal: Patient/Family Short Term Goal  Description: Patient's Short Term Goal:     Interventions:   -   - See additional Care Plan goals for specific interventions  9/6/2024 1823 by Rena Samayoa RN  Outcome: Progressing  9/6/2024 1601 by Rena Samayoa RN  Outcome: Progressing     Problem: PAIN - ADULT  Goal: Verbalizes/displays adequate comfort level or patient's stated pain goal  Description: INTERVENTIONS:  - Encourage pt to monitor pain and request assistance  - Assess pain using appropriate pain scale  - Administer analgesics based on type and severity of pain and evaluate response  - Implement non-pharmacological measures as appropriate and evaluate response  - Consider cultural and social influences on pain and pain management  - Manage/alleviate anxiety  - Utilize distraction and/or relaxation techniques  - Monitor for opioid side effects  -  Notify MD/LIP if interventions unsuccessful or patient reports new pain  - Anticipate increased pain with activity and pre-medicate as appropriate  9/6/2024 1823 by Rena Samayoa RN  Outcome: Progressing  9/6/2024 1601 by Rena Samayoa RN  Outcome: Progressing     Problem: RISK FOR INFECTION - ADULT  Goal: Absence of fever/infection during anticipated neutropenic period  Description: INTERVENTIONS  - Monitor WBC  - Administer growth factors as ordered  - Implement neutropenic guidelines  9/6/2024 1823 by Rena Samayoa RN  Outcome: Progressing  9/6/2024 1601 by Rena Samayoa RN  Outcome: Progressing     Problem: SAFETY ADULT - FALL  Goal: Free from fall injury  Description: INTERVENTIONS:  - Assess pt frequently for physical needs  - Identify cognitive and physical deficits and behaviors that affect risk of falls.  - [place] fall precautions as indicated by assessment.  - Educate pt/family on patient safety including physical limitations  - Instruct pt to call for assistance with activity based on assessment  - Modify environment to reduce risk of injury  - Provide assistive devices as appropriate  - Consider OT/PT consult to assist with strengthening/mobility  - Encourage toileting schedule  9/6/2024 1823 by Rena Samayoa RN  Outcome: Progressing  9/6/2024 1601 by Rena Samayoa RN  Outcome: Progressing     Problem: SKIN/TISSUE INTEGRITY - ADULT  Goal: Skin integrity remains intact  Description: INTERVENTIONS  - Assess and document risk factors for pressure ulcer development  - Assess and document skin integrity  - Monitor for areas of redness and/or skin breakdown  - Initiate interventions, skin care algorithm/standards of care as needed  9/6/2024 1823 by Rena Samayoa RN  Outcome: Progressing  9/6/2024 1601 by Rena Samayoa RN  Outcome: Progressing  Goal: Incision(s), wounds(s) or drain site(s) healing without S/S of infection  Description: INTERVENTIONS:  - Assess and document risk factors for pressure ulcer  development  - Assess and document skin integrity  - Assess and document dressing/incision, wound bed, drain sites and surrounding tissue  - Implement wound care per orders  - Initiate isolation precautions as appropriate  - Initiate Pressure Ulcer prevention bundle as indicated  9/6/2024 1823 by Rena Samayoa RN  Outcome: Progressing  9/6/2024 1601 by Rena Samayoa RN  Outcome: Progressing  Goal: Oral mucous membranes remain intact  Description: INTERVENTIONS  - Assess oral mucosa and hygiene practices  - Implement preventative oral hygiene regimen  - Implement oral medicated treatments as ordered  9/6/2024 1823 by Rena Samayoa RN  Outcome: Progressing  9/6/2024 1601 by Rena Samayoa RN  Outcome: Progressing     Problem: Delirium  Goal: Minimize duration of delirium  Description: Interventions:  - Encourage use of hearing aids, eye glasses  - Promote highest level of mobility daily  - Provide frequent reorientation  - Promote wakefulness i.e. lights on, blinds open  - Promote sleep, encourage patient's normal rest cycle i.e. lights off, TV off, minimize noise and interruptions  - Encourage family to assist in orientation and promotion of home routines  9/6/2024 1823 by Rena Samayoa RN  Outcome: Progressing  9/6/2024 1601 by Rena Samayoa RN  Outcome: Progressing     Problem: Diabetes/Glucose Control  Goal: Glucose maintained within prescribed range  Description: INTERVENTIONS:  - Monitor Blood Glucose as ordered  - Assess for signs and symptoms of hyperglycemia and hypoglycemia  - Administer ordered medications to maintain glucose within target range  - Assess barriers to adequate nutritional intake and initiate nutrition consult as needed  - Instruct patient on self management of diabetes  9/6/2024 1823 by Rena Samayoa RN  Outcome: Progressing  9/6/2024 1601 by Rena Samayoa RN  Outcome: Progressing     Problem: RESPIRATORY - ADULT  Goal: Achieves optimal ventilation and oxygenation  Description: INTERVENTIONS:  -  Assess for changes in respiratory status  - Assess for changes in mentation and behavior  - Position to facilitate oxygenation and minimize respiratory effort  - Oxygen supplementation based on oxygen saturation or ABGs  - Provide Smoking Cessation handout, if applicable  - Encourage broncho-pulmonary hygiene including cough, deep breathe, Incentive Spirometry  - Assess the need for suctioning and perform as needed  - Assess and instruct to report SOB or any respiratory difficulty  - Respiratory Therapy support as indicated  - Manage/alleviate anxiety  - Monitor for signs/symptoms of CO2 retention  9/6/2024 1823 by Rena Samayoa RN  Outcome: Progressing  9/6/2024 1601 by Rena Samayoa RN  Outcome: Progressing     Problem: CARDIOVASCULAR - ADULT  Goal: Maintains optimal cardiac output and hemodynamic stability  Description: INTERVENTIONS:  - Monitor vital signs, rhythm, and trends  - Monitor for bleeding, hypotension and signs of decreased cardiac output  - Evaluate effectiveness of vasoactive medications to optimize hemodynamic stability  - Monitor arterial and/or venous puncture sites for bleeding and/or hematoma  - Assess quality of pulses, skin color and temperature  - Assess for signs of decreased coronary artery perfusion - ex. Angina  - Evaluate fluid balance, assess for edema, trend weights  9/6/2024 1823 by Rena Samayoa RN  Outcome: Progressing  9/6/2024 1601 by Rena Samayoa RN  Outcome: Progressing  Goal: Absence of cardiac arrhythmias or at baseline  Description: INTERVENTIONS:  - Continuous cardiac monitoring, monitor vital signs, obtain 12 lead EKG if indicated  - Evaluate effectiveness of antiarrhythmic and heart rate control medications as ordered  - Initiate emergency measures for life threatening arrhythmias  - Monitor electrolytes and administer replacement therapy as ordered  9/6/2024 1823 by Rena Samayoa RN  Outcome: Progressing  9/6/2024 1601 by Rena Samayoa RN  Outcome: Progressing     Problem:  METABOLIC/FLUID AND ELECTROLYTES - ADULT  Goal: Glucose maintained within prescribed range  Description: INTERVENTIONS:  - Monitor Blood Glucose as ordered  - Assess for signs and symptoms of hyperglycemia and hypoglycemia  - Administer ordered medications to maintain glucose within target range  - Assess barriers to adequate nutritional intake and initiate nutrition consult as needed  - Instruct patient on self management of diabetes  9/6/2024 1823 by Rena Samayoa RN  Outcome: Progressing  9/6/2024 1601 by Rena Samayoa RN  Outcome: Progressing  Goal: Electrolytes maintained within normal limits  Description: INTERVENTIONS:  - Monitor labs and rhythm and assess patient for signs and symptoms of electrolyte imbalances  - Administer electrolyte replacement as ordered  - Monitor response to electrolyte replacements, including rhythm and repeat lab results as appropriate  - Fluid restriction as ordered  - Instruct patient on fluid and nutrition restrictions as appropriate  9/6/2024 1823 by Rena Samayoa RN  Outcome: Progressing  9/6/2024 1601 by Rena Samayoa RN  Outcome: Progressing  Goal: Hemodynamic stability and optimal renal function maintained  Description: INTERVENTIONS:  - Monitor labs and assess for signs and symptoms of volume excess or deficit  - Monitor intake, output and patient weight  - Monitor urine specific gravity, serum osmolarity and serum sodium as indicated or ordered  - Monitor response to interventions for patient's volume status, including labs, urine output, blood pressure (other measures as available)  - Encourage oral intake as appropriate  - Instruct patient on fluid and nutrition restrictions as appropriate  9/6/2024 1823 by Rena Samayoa RN  Outcome: Progressing  9/6/2024 1601 by Rena Samayoa RN  Outcome: Progressing

## 2024-09-07 ENCOUNTER — APPOINTMENT (OUTPATIENT)
Dept: GENERAL RADIOLOGY | Facility: HOSPITAL | Age: 79
DRG: 853 | End: 2024-09-07
Attending: INTERNAL MEDICINE
Payer: MEDICARE

## 2024-09-07 LAB
ALBUMIN SERPL-MCNC: 2.3 G/DL (ref 3.2–4.8)
ALBUMIN/GLOB SERPL: 0.7 {RATIO} (ref 1–2)
ALP LIVER SERPL-CCNC: 198 U/L
ALT SERPL-CCNC: 79 U/L
ANION GAP SERPL CALC-SCNC: 7 MMOL/L (ref 0–18)
AST SERPL-CCNC: 138 U/L (ref ?–34)
BASOPHILS # BLD: 0 X10(3) UL (ref 0–0.2)
BASOPHILS NFR BLD: 0 %
BILIRUB SERPL-MCNC: 0.7 MG/DL (ref 0.2–1.1)
BUN BLD-MCNC: 33 MG/DL (ref 9–23)
BUN/CREAT SERPL: 25.8 (ref 10–20)
CALCIUM BLD-MCNC: 8.1 MG/DL (ref 8.7–10.4)
CHLORIDE SERPL-SCNC: 114 MMOL/L (ref 98–112)
CO2 SERPL-SCNC: 22 MMOL/L (ref 21–32)
CREAT BLD-MCNC: 1.28 MG/DL
DEPRECATED RDW RBC AUTO: 73.3 FL (ref 35.1–46.3)
EGFRCR SERPLBLD CKD-EPI 2021: 43 ML/MIN/1.73M2 (ref 60–?)
EOSINOPHIL # BLD: 0.32 X10(3) UL (ref 0–0.7)
EOSINOPHIL NFR BLD: 2 %
ERYTHROCYTE [DISTWIDTH] IN BLOOD BY AUTOMATED COUNT: 24.3 % (ref 11–15)
GLOBULIN PLAS-MCNC: 3.2 G/DL (ref 2–3.5)
GLUCOSE BLD-MCNC: 78 MG/DL (ref 70–99)
GLUCOSE BLDC GLUCOMTR-MCNC: 75 MG/DL (ref 70–99)
GLUCOSE BLDC GLUCOMTR-MCNC: 80 MG/DL (ref 70–99)
GLUCOSE BLDC GLUCOMTR-MCNC: 91 MG/DL (ref 70–99)
GLUCOSE BLDC GLUCOMTR-MCNC: 99 MG/DL (ref 70–99)
HCT VFR BLD AUTO: 26.9 %
HGB BLD-MCNC: 8.1 G/DL
LYMPHOCYTES NFR BLD: 0.8 X10(3) UL (ref 1–4)
LYMPHOCYTES NFR BLD: 5 %
MAGNESIUM SERPL-MCNC: 1.7 MG/DL (ref 1.6–2.6)
MCH RBC QN AUTO: 29.8 PG (ref 26–34)
MCHC RBC AUTO-ENTMCNC: 30.1 G/DL (ref 31–37)
MCV RBC AUTO: 98.9 FL
MONOCYTES # BLD: 0.64 X10(3) UL (ref 0.1–1)
MONOCYTES NFR BLD: 4 %
NEUTROPHILS # BLD AUTO: 12.97 X10 (3) UL (ref 1.5–7.7)
NEUTROPHILS NFR BLD: 88 %
NEUTS BAND NFR BLD: 1 %
NEUTS HYPERSEG # BLD: 14.24 X10(3) UL (ref 1.5–7.7)
NRBC BLD MANUAL-RTO: 4 %
OSMOLALITY SERPL CALC.SUM OF ELEC: 302 MOSM/KG (ref 275–295)
PHOSPHATE SERPL-MCNC: 3.8 MG/DL (ref 2.4–5.1)
PLATELET # BLD AUTO: 175 10(3)UL (ref 150–450)
PLATELET MORPHOLOGY: NORMAL
POTASSIUM SERPL-SCNC: 4.3 MMOL/L (ref 3.5–5.1)
PROT SERPL-MCNC: 5.5 G/DL (ref 5.7–8.2)
RBC # BLD AUTO: 2.72 X10(6)UL
SODIUM SERPL-SCNC: 143 MMOL/L (ref 136–145)
TOTAL CELLS COUNTED BLD: 100
WBC # BLD AUTO: 16 X10(3) UL (ref 4–11)

## 2024-09-07 PROCEDURE — 99233 SBSQ HOSP IP/OBS HIGH 50: CPT | Performed by: HOSPITALIST

## 2024-09-07 PROCEDURE — 71045 X-RAY EXAM CHEST 1 VIEW: CPT | Performed by: INTERNAL MEDICINE

## 2024-09-07 PROCEDURE — 99232 SBSQ HOSP IP/OBS MODERATE 35: CPT | Performed by: INTERNAL MEDICINE

## 2024-09-07 PROCEDURE — 99233 SBSQ HOSP IP/OBS HIGH 50: CPT | Performed by: INTERNAL MEDICINE

## 2024-09-07 RX ORDER — FUROSEMIDE 10 MG/ML
INJECTION INTRAMUSCULAR; INTRAVENOUS
Status: COMPLETED
Start: 2024-09-07 | End: 2024-09-07

## 2024-09-07 RX ORDER — MAGNESIUM SULFATE HEPTAHYDRATE 40 MG/ML
2 INJECTION, SOLUTION INTRAVENOUS ONCE
Status: COMPLETED | OUTPATIENT
Start: 2024-09-07 | End: 2024-09-07

## 2024-09-07 RX ORDER — DEXTROSE MONOHYDRATE 100 MG/ML
INJECTION, SOLUTION INTRAVENOUS CONTINUOUS
Status: DISCONTINUED | OUTPATIENT
Start: 2024-09-07 | End: 2024-09-10

## 2024-09-07 RX ORDER — FUROSEMIDE 10 MG/ML
20 INJECTION INTRAMUSCULAR; INTRAVENOUS ONCE
Status: COMPLETED | OUTPATIENT
Start: 2024-09-07 | End: 2024-09-07

## 2024-09-07 NOTE — CM/SW NOTE
[facility] received for hospice. [facility] meet with [facility] and daughter at bedside. CM will defer dc planning to hospice team at this time.    EVA SpringerN    [phone]

## 2024-09-07 NOTE — PLAN OF CARE
Problem: Patient Centered Care  Goal: Patient preferences are identified and integrated in the patient's plan of care  Description: Interventions:  - What would you like us to know as we care for you? I live at home with my   - Provide timely, complete, and accurate information to patient/family  - Incorporate patient and family knowledge, values, beliefs, and cultural backgrounds into the planning and delivery of care  - Encourage patient/family to participate in care and decision-making at the level they choose  - Honor patient and family perspectives and choices  Outcome: Progressing     Problem: Patient/Family Goals  Goal: Patient/Family Long Term Goal  Description: Patient's Long Term Goal: Discharge from hospital    Interventions:  - Monitor vital signs  - Monitor appropriate labs  - Monitor blood glucose levels  - Pain management  - Oxygen and respiratory intervention as needed  - Administer medications per order  - Follow MD orders  - Diagnostics per order  - Update / inform patient and family on plan of care  - Discharge planning  - See additional Care Plan goals for specific interventions  Outcome: Progressing  Goal: Patient/Family Short Term Goal  Description: Patient's Short Term Goal: Improve pain    Interventions:   - Monitor vital signs  - Monitor appropriate labs  - Monitor blood glucose levels  - Pain management  - Oxygen and respiratory intervention as needed  - Administer medications per order  - Follow MD orders  - Diagnostics per order  - Update / inform patient and family on plan of care  - Discharge planning  - See additional Care Plan goals for specific interventions  Outcome: Progressing     Problem: PAIN - ADULT  Goal: Verbalizes/displays adequate comfort level or patient's stated pain goal  Description: INTERVENTIONS:  - Encourage pt to monitor pain and request assistance  - Assess pain using appropriate pain scale  - Administer analgesics based on type and severity of pain and  evaluate response  - Implement non-pharmacological measures as appropriate and evaluate response  - Consider cultural and social influences on pain and pain management  - Manage/alleviate anxiety  - Utilize distraction and/or relaxation techniques  - Monitor for opioid side effects  - Notify MD/LIP if interventions unsuccessful or patient reports new pain  - Anticipate increased pain with activity and pre-medicate as appropriate  Outcome: Progressing     Problem: RISK FOR INFECTION - ADULT  Goal: Absence of fever/infection during anticipated neutropenic period  Description: INTERVENTIONS  - Monitor WBC  - Administer growth factors as ordered  - Implement neutropenic guidelines  Outcome: Progressing     Problem: SAFETY ADULT - FALL  Goal: Free from fall injury  Description: INTERVENTIONS:  - Assess pt frequently for physical needs  - Identify cognitive and physical deficits and behaviors that affect risk of falls.  - [place] fall precautions as indicated by assessment.  - Educate pt/family on patient safety including physical limitations  - Instruct pt to call for assistance with activity based on assessment  - Modify environment to reduce risk of injury  - Provide assistive devices as appropriate  - Consider OT/PT consult to assist with strengthening/mobility  - Encourage toileting schedule  Outcome: Progressing     Problem: SKIN/TISSUE INTEGRITY - ADULT  Goal: Skin integrity remains intact  Description: INTERVENTIONS  - Assess and document risk factors for pressure ulcer development  - Assess and document skin integrity  - Monitor for areas of redness and/or skin breakdown  - Initiate interventions, skin care algorithm/standards of care as needed  Outcome: Progressing  Goal: Incision(s), wounds(s) or drain site(s) healing without S/S of infection  Description: INTERVENTIONS:  - Assess and document risk factors for pressure ulcer development  - Assess and document skin integrity  - Assess and document  dressing/incision, wound bed, drain sites and surrounding tissue  - Implement wound care per orders  - Initiate isolation precautions as appropriate  - Initiate Pressure Ulcer prevention bundle as indicated  Outcome: Progressing  Goal: Oral mucous membranes remain intact  Description: INTERVENTIONS  - Assess oral mucosa and hygiene practices  - Implement preventative oral hygiene regimen  - Implement oral medicated treatments as ordered  Outcome: Progressing     Problem: Delirium  Goal: Minimize duration of delirium  Description: Interventions:  - Encourage use of hearing aids, eye glasses  - Promote highest level of mobility daily  - Provide frequent reorientation  - Promote wakefulness i.e. lights on, blinds open  - Promote sleep, encourage patient's normal rest cycle i.e. lights off, TV off, minimize noise and interruptions  - Encourage family to assist in orientation and promotion of home routines  Outcome: Progressing     Problem: Diabetes/Glucose Control  Goal: Glucose maintained within prescribed range  Description: INTERVENTIONS:  - Monitor Blood Glucose as ordered  - Assess for signs and symptoms of hyperglycemia and hypoglycemia  - Administer ordered medications to maintain glucose within target range  - Assess barriers to adequate nutritional intake and initiate nutrition consult as needed  - Instruct patient on self management of diabetes  Outcome: Progressing     Problem: RESPIRATORY - ADULT  Goal: Achieves optimal ventilation and oxygenation  Description: INTERVENTIONS:  - Assess for changes in respiratory status  - Assess for changes in mentation and behavior  - Position to facilitate oxygenation and minimize respiratory effort  - Oxygen supplementation based on oxygen saturation or ABGs  - Provide Smoking Cessation handout, if applicable  - Encourage broncho-pulmonary hygiene including cough, deep breathe, Incentive Spirometry  - Assess the need for suctioning and perform as needed  - Assess and  instruct to report SOB or any respiratory difficulty  - Respiratory Therapy support as indicated  - Manage/alleviate anxiety  - Monitor for signs/symptoms of CO2 retention  Outcome: Progressing     Problem: CARDIOVASCULAR - ADULT  Goal: Maintains optimal cardiac output and hemodynamic stability  Description: INTERVENTIONS:  - Monitor vital signs, rhythm, and trends  - Monitor for bleeding, hypotension and signs of decreased cardiac output  - Evaluate effectiveness of vasoactive medications to optimize hemodynamic stability  - Monitor arterial and/or venous puncture sites for bleeding and/or hematoma  - Assess quality of pulses, skin color and temperature  - Assess for signs of decreased coronary artery perfusion - ex. Angina  - Evaluate fluid balance, assess for edema, trend weights  Outcome: Progressing  Goal: Absence of cardiac arrhythmias or at baseline  Description: INTERVENTIONS:  - Continuous cardiac monitoring, monitor vital signs, obtain 12 lead EKG if indicated  - Evaluate effectiveness of antiarrhythmic and heart rate control medications as ordered  - Initiate emergency measures for life threatening arrhythmias  - Monitor electrolytes and administer replacement therapy as ordered  Outcome: Progressing     Problem: METABOLIC/FLUID AND ELECTROLYTES - ADULT  Goal: Glucose maintained within prescribed range  Description: INTERVENTIONS:  - Monitor Blood Glucose as ordered  - Assess for signs and symptoms of hyperglycemia and hypoglycemia  - Administer ordered medications to maintain glucose within target range  - Assess barriers to adequate nutritional intake and initiate nutrition consult as needed  - Instruct patient on self management of diabetes  Outcome: Progressing  Goal: Electrolytes maintained within normal limits  Description: INTERVENTIONS:  - Monitor labs and rhythm and assess patient for signs and symptoms of electrolyte imbalances  - Administer electrolyte replacement as ordered  - Monitor response  to electrolyte replacements, including rhythm and repeat lab results as appropriate  - Fluid restriction as ordered  - Instruct patient on fluid and nutrition restrictions as appropriate  Outcome: Progressing  Goal: Hemodynamic stability and optimal renal function maintained  Description: INTERVENTIONS:  - Monitor labs and assess for signs and symptoms of volume excess or deficit  - Monitor intake, output and patient weight  - Monitor urine specific gravity, serum osmolarity and serum sodium as indicated or ordered  - Monitor response to interventions for patient's volume status, including labs, urine output, blood pressure (other measures as available)  - Encourage oral intake as appropriate  - Instruct patient on fluid and nutrition restrictions as appropriate  Outcome: Progressing

## 2024-09-07 NOTE — HOSPICE RN NOTE
[facility] new referral received. Aidin sent. Hospice will reach out to family to set up a time to meet.    1500- left voicemail for daughter Barbi.    Angy Ventura RN, BSN  Transitional Nurse Liaison  [facility]  [phone] (After-hours)

## 2024-09-07 NOTE — PROGRESS NOTES
RRT    *See RRT Documentation Record*    Reason the RRT was called: Desaturation. SPO2 73-80 on nonrebreather  Assessment of patient leading up to RRT: SPO2 73-80 on non rebreather  Interventions/Testing: SPO2  Patient Outcome/Disposition: Pt Saturation 100% on non rebreather. Pt remain on the unit. Lasik IV push given as order. Will continue to wean o2 as order.   Family Notified: yes  Name of family notified: Barbi

## 2024-09-07 NOTE — PROGRESS NOTES
Daily Pulmonary/ICU/Critical Care Progress Note          SUBJECTIVE:  More awake and alert. Denies complaints.   Transferred out of ICU.  RRT overnight for hypoxemia and needed NRB but weaned to 4 LNC this am. RN thinks pulse ox may not have been picking up. Otherwise, pt denies any new resp symptoms.       ALLERGIES:  Allergies   Allergen Reactions    Lisinopril Coughing         MEDS:  Home Medications:  Outpatient Medications Marked as Taking for the 8/12/24 encounter (Hospital Encounter)   Medication Sig Dispense Refill    bumetanide 1 MG Oral Tab Take 1 tablet (1 mg total) by mouth daily. 30 tablet 2    carvedilol 6.25 MG Oral Tab Take 0.5 tablets (3.125 mg total) by mouth 2 (two) times daily with meals. 30 tablet 2    sennosides (SENNA-TIME) 8.6 MG Oral Tab senna 8.6 mg tablet, [RxNorm: 244581]      cyclobenzaprine 10 MG Oral Tab Take 1 tablet (10 mg total) by mouth nightly. 30 tablet 1    fentaNYL 12 MCG/HR Transdermal Patch 72 Hr Place 1 patch onto the skin every third day. (Patient taking differently: Place 1 patch onto the skin every third day. Placed 8/10 left arm) 10 patch 0    midodrine 5 MG Oral Tab Take 1 tablet (5 mg total) by mouth 2 (two) times daily before meals. 60 tablet 0    senna-docusate 8.6-50 MG Oral Tab Take 2 tablets by mouth daily. 30 tablet 0    isosorbide dinitrate 20 MG Oral Tab Take 1 tablet (20 mg total) by mouth TID (Nitrates). 90 tablet 3    gabapentin 300 MG Oral Cap Take 1 capsule (300 mg total) by mouth 3 (three) times daily. 90 capsule 0    dapagliflozin (FARXIGA) 10 MG Oral Tab Take 1 tablet (10 mg total) by mouth daily. 30 tablet 5    spironolactone 25 MG Oral Tab       alendronate 70 MG Oral Tab Take 1 tablet (70 mg total) by mouth once a week. (Patient taking differently: Take 1 tablet (70 mg total) by mouth once a week. Every Tuesday) 12 tablet 3    methotrexate 2.5 MG Oral Tab TAKE 6 TABLETS BY MOUTH 1 TIME A WEEK 77 tablet 0    lidocaine-menthol 4-1 % External Patch  Place 1 patch onto the skin daily. 30 patch 0    PANTOPRAZOLE 40 MG Oral Tab EC TAKE 1 TABLET(40 MG) BY MOUTH TWICE DAILY BEFORE MEALS 180 tablet 0    SACUBITRIL-VALSARTAN 49-51 MG Oral Tab Take 1 tablet by mouth 2 (two) times daily. 60 tablet 1    folic acid 800 MCG Oral Tab Take 1 tablet (800 mcg total) by mouth daily. 90 tablet 0    amiodarone 200 MG Oral Tab Take 1 tablet (200 mg total) by mouth daily.      ferrous sulfate 325 (65 FE) MG Oral Tab EC Take 1 tablet (325 mg total) by mouth daily with breakfast.       Scheduled Medication:   vancomycin  15 mg/kg Intravenous Q36H    bumetanide  1 mg Intravenous BID (Diuretic)    potassium chloride  20 mEq Oral BID    pantoprazole  40 mg Intravenous Daily    sodium ferric gluconate  125 mg Intravenous Daily    vancomycin  125 mg Oral q12h    heparin  5,000 Units Subcutaneous Q8H BROOKLYNN    acetaminophen  650 mg Oral Q8H    sacubitril-valsartan  1 tablet Oral BID    metoprolol succinate  25 mg Oral Daily Beta Blocker    [Held by provider] midodrine  5 mg Oral TID    sodium hypochlorite   Topical BID    amiodarone  200 mg Oral Daily    folic acid  800 mcg Oral Daily    gabapentin  300 mg Oral TID     Continuous Infusing Medication:   sodium bicarbonate in D5W infusion 100 mEq (09/06/24 1721)     PRN Medications:    loperamide    HYDROcodone-acetaminophen    HYDROcodone-acetaminophen    HYDROmorphone **OR** [DISCONTINUED] HYDROmorphone **OR** [DISCONTINUED] HYDROmorphone    senna-docusate    polyethylene glycol (PEG 3350)    glucose **OR** glucose **OR** glucose-vitamin C **OR** dextrose **OR** glucose **OR** glucose **OR** glucose-vitamin C    traMADol    ondansetron    metoclopramide    acetaminophen       PHYSICAL EXAM:  /63 (BP Location: Radial)   Pulse 63   Temp 97.5 °F (36.4 °C) (Axillary)   Resp 12   Wt 149 lb 14.6 oz (68 kg)   SpO2 100%   BMI 25.73 kg/m²      CONSTITUTIONAL: alert, awake, NAD  HEENT: atraumatic normocephalic  MOUTH: mucous membranes are  moist. No OP exudates  NECK/THROAT: no JVD. Trachea midline. No obvious thyromegaly  LUNG: clear upper with diminished at bases. No wheezing. Chest symmetric with respiratory motion  HEART: regular rate and rhythm, + m  ABD: soft non tender. + bowel sounds. No organomegaly noted  EXT: no clubbing, cyanosis. + b/l LE edema noted      IMAGES:   Reviewed in EMR      LABS:  Recent Labs   Lab 09/04/24  0507 09/05/24  1116 09/06/24  0448 09/07/24  0450   RBC 2.48* 2.76* 2.70* 2.72*   HGB 7.8* 8.4* 8.4* 8.1*   HCT 24.3* 26.7* 26.2* 26.9*   MCV 98.0 96.7 97.0 98.9   MCH 31.5 30.4 31.1 29.8   MCHC 32.1 31.5 32.1 30.1*   RDW 21.5* 22.5* 23.0* 24.3*   NEPRELIM 26.29* 23.31*  --  12.97*   WBC 35.7* 28.7* 20.7* 16.0*   .0 210.0 193.0 175.0       Recent Labs   Lab 09/05/24  1116 09/06/24  0448 09/07/24  0450   * 84 78   BUN 39* 37* 33*   CREATSERUM 1.70* 1.47* 1.28*   EGFRCR 31* 36* 43*   CA 8.4* 8.0* 8.1*   ALB 2.7* 2.4* 2.3*    145 143   K 3.7 3.6 4.3   * 116* 114*   CO2 19.0* 21.0 22.0   ALKPHO 238* 219* 198*   * 236* 138*   ALT 67* 99* 79*   BILT 0.9 0.8 0.7   TP 6.1 5.5* 5.5*       ASSESSMENT/PLAN:  1. Severe sepsis resolved now  Possible intra-abdominal source vs pulm source. Ct abd/pelvis reviewed   Infected decubitus  Patient already on broad-spectrum antibiotics  ID following  If needs pleural effusion drained (thora vs chest tube) then would recommend consulting IR as would be difficult to perform at bedside  WBC improving      2. Infected sacral decubitus ulcer  Presented on admission  S/p surgical debridement  ID and surgery following  Wound care following     3. Lactic and metabolic acidosis  With underlying acute on chronic kidney injury  Started on bicarb drip and renal following/managing  AM labs improved     4. Severe nonischemic cardiomyopathy with severe pulmonary hypertension  Significant cardiomegaly on CXR with chronic basilar effusion  S/p right thoracentesis 7/12/2024  negative cytology and culture likely secondary to CHF and CKD     5. Acute toxic and metabolic encephalopathy  Unresponsive has resolved  Head CT negative  Supportive care     6. Severe rheumatoid arthritis with significant joint deformities  Bedbound with chronic sacral decub     7. Acute on chronic respiratory failure with hypoxia  O2 therapy. Now on 4 LNC with sats in upper 90s     8. DVT prophylaxis  Heparin subcu     9. DNAR/select  Prognosis is guarded and further setting of limited appropriate  Palliative care following    Will follow.     Thank you for the opportunity to care for Margaret Massey DO, MPH  Pulmonary Critical Care Medicine  [facility]

## 2024-09-07 NOTE — PROGRESS NOTES
Double RN skin check done prior to transfer off Unit. Skin check performed by this RN and Sujata.     Wounds are as follows: Sacral woound    Will remain available for any further questions or concerns.      Larisa Weaver MD

## 2024-09-07 NOTE — PHYSICAL THERAPY NOTE
Chart reviewed. Physical therapy on hold. S/P RRT 9/7/24 due to decreased O2 sats. Pt also referred for  hospice evaluation.

## 2024-09-07 NOTE — PLAN OF CARE
Problem: Patient Centered Care  Goal: Patient preferences are identified and integrated in the patient's plan of care  Description: Interventions:  - What would you like us to know as we care for you?   - Provide timely, complete, and accurate information to patient/family  - Incorporate patient and family knowledge, values, beliefs, and cultural backgrounds into the planning and delivery of care  - Encourage patient/family to participate in care and decision-making at the level they choose  - Honor patient and family perspectives and choices  Outcome: Progressing     Problem: PAIN - ADULT  Goal: Verbalizes/displays adequate comfort level or patient's stated pain goal  Description: INTERVENTIONS:  - Encourage pt to monitor pain and request assistance  - Assess pain using appropriate pain scale  - Administer analgesics based on type and severity of pain and evaluate response  - Implement non-pharmacological measures as appropriate and evaluate response  - Consider cultural and social influences on pain and pain management  - Manage/alleviate anxiety  - Utilize distraction and/or relaxation techniques  - Monitor for opioid side effects  - Notify MD/LIP if interventions unsuccessful or patient reports new pain  - Anticipate increased pain with activity and pre-medicate as appropriate  Outcome: Progressing     Problem: RISK FOR INFECTION - ADULT  Goal: Absence of fever/infection during anticipated neutropenic period  Description: INTERVENTIONS  - Monitor WBC  - Administer growth factors as ordered  - Implement neutropenic guidelines  Outcome: Progressing     Problem: SAFETY ADULT - FALL  Goal: Free from fall injury  Description: INTERVENTIONS:  - Assess pt frequently for physical needs  - Identify cognitive and physical deficits and behaviors that affect risk of falls.  - [place] fall precautions as indicated by assessment.  - Educate pt/family on patient safety including physical limitations  - Instruct pt to call  for assistance with activity based on assessment  - Modify environment to reduce risk of injury  - Provide assistive devices as appropriate  - Consider OT/PT consult to assist with strengthening/mobility  - Encourage toileting schedule  Outcome: Progressing     Problem: SKIN/TISSUE INTEGRITY - ADULT  Goal: Skin integrity remains intact  Description: INTERVENTIONS  - Assess and document risk factors for pressure ulcer development  - Assess and document skin integrity  - Monitor for areas of redness and/or skin breakdown  - Initiate interventions, skin care algorithm/standards of care as needed  Outcome: Progressing  Goal: Incision(s), wounds(s) or drain site(s) healing without S/S of infection  Description: INTERVENTIONS:  - Assess and document risk factors for pressure ulcer development  - Assess and document skin integrity  - Assess and document dressing/incision, wound bed, drain sites and surrounding tissue  - Implement wound care per orders  - Initiate isolation precautions as appropriate  - Initiate Pressure Ulcer prevention bundle as indicated  Outcome: Progressing  Goal: Oral mucous membranes remain intact  Description: INTERVENTIONS  - Assess oral mucosa and hygiene practices  - Implement preventative oral hygiene regimen  - Implement oral medicated treatments as ordered  Outcome: Progressing     Problem: RESPIRATORY - ADULT  Goal: Achieves optimal ventilation and oxygenation  Description: INTERVENTIONS:  - Assess for changes in respiratory status  - Assess for changes in mentation and behavior  - Position to facilitate oxygenation and minimize respiratory effort  - Oxygen supplementation based on oxygen saturation or ABGs  - Provide Smoking Cessation handout, if applicable  - Encourage broncho-pulmonary hygiene including cough, deep breathe, Incentive Spirometry  - Assess the need for suctioning and perform as needed  - Assess and instruct to report SOB or any respiratory difficulty  - Respiratory Therapy  support as indicated  - Manage/alleviate anxiety  - Monitor for signs/symptoms of CO2 retention  Outcome: Progressing     Problem: CARDIOVASCULAR - ADULT  Goal: Maintains optimal cardiac output and hemodynamic stability  Description: INTERVENTIONS:  - Monitor vital signs, rhythm, and trends  - Monitor for bleeding, hypotension and signs of decreased cardiac output  - Evaluate effectiveness of vasoactive medications to optimize hemodynamic stability  - Monitor arterial and/or venous puncture sites for bleeding and/or hematoma  - Assess quality of pulses, skin color and temperature  - Assess for signs of decreased coronary artery perfusion - ex. Angina  - Evaluate fluid balance, assess for edema, trend weights  Outcome: Not Progressing  Goal: Absence of cardiac arrhythmias or at baseline  Description: INTERVENTIONS:  - Continuous cardiac monitoring, monitor vital signs, obtain 12 lead EKG if indicated  - Evaluate effectiveness of antiarrhythmic and heart rate control medications as ordered  - Initiate emergency measures for life threatening arrhythmias  - Monitor electrolytes and administer replacement therapy as ordered  Outcome: Progressing     Problem: METABOLIC/FLUID AND ELECTROLYTES - ADULT  Goal: Glucose maintained within prescribed range  Description: INTERVENTIONS:  - Monitor Blood Glucose as ordered  - Assess for signs and symptoms of hyperglycemia and hypoglycemia  - Administer ordered medications to maintain glucose within target range  - Assess barriers to adequate nutritional intake and initiate nutrition consult as needed  - Instruct patient on self management of diabetes  Outcome: Progressing  Goal: Electrolytes maintained within normal limits  Description: INTERVENTIONS:  - Monitor labs and rhythm and assess patient for signs and symptoms of electrolyte imbalances  - Administer electrolyte replacement as ordered  - Monitor response to electrolyte replacements, including rhythm and repeat lab results as  appropriate  - Fluid restriction as ordered  - Instruct patient on fluid and nutrition restrictions as appropriate  Outcome: Progressing  Goal: Hemodynamic stability and optimal renal function maintained  Description: INTERVENTIONS:  - Monitor labs and assess for signs and symptoms of volume excess or deficit  - Monitor intake, output and patient weight  - Monitor urine specific gravity, serum osmolarity and serum sodium as indicated or ordered  - Monitor response to interventions for patient's volume status, including labs, urine output, blood pressure (other measures as available)  - Encourage oral intake as appropriate  - Instruct patient on fluid and nutrition restrictions as appropriate  Outcome: Progressing     Problem: Delirium  Goal: Minimize duration of delirium  Description: Interventions:  - Encourage use of hearing aids, eye glasses  - Promote highest level of mobility daily  - Provide frequent reorientation  - Promote wakefulness i.e. lights on, blinds open  - Promote sleep, encourage patient's normal rest cycle i.e. lights off, TV off, minimize noise and interruptions  - Encourage family to assist in orientation and promotion of home routines  Outcome: Progressing     Problem: Diabetes/Glucose Control  Goal: Glucose maintained within prescribed range  Description: INTERVENTIONS:  - Monitor Blood Glucose as ordered  - Assess for signs and symptoms of hyperglycemia and hypoglycemia  - Administer ordered medications to maintain glucose within target range  - Assess barriers to adequate nutritional intake and initiate nutrition consult as needed  - Instruct patient on self management of diabetes  Outcome: Progressing   Pt is lethargic but is rousable. Pt is able to take pills whole. Remote Tele and pulse ox monitoring using the pt forehead or earlobe for reading d/T pt's cold extremities. Pt does struggle with liquid Rx. Safety precaution in place and call light is within the pt's reach. RRT was called d/t  no to low SPO2 readings. Pt's daughter and POA was notified.

## 2024-09-07 NOTE — PROGRESS NOTES
[facility]  part of [facility]    Progress Note    Margaret Silverio Patient Status:  Inpatient    3/14/1945 MRN [id number]   Location [facility]5W Attending Raiza Alvarez MD   Hosp Day # 26 PCP Johnathon Rodriguez,        Subjective:   Margaret Silverio is a(n) 79 year old female who I am seeing for   GEORGE/cardiorenal    RRT overnight.  Due to decreased oxygen saturation  Was on nonrebreather but now on oxygen    Objective:   /54 (BP Location: Right arm)   Pulse 60   Temp 97.5 °F (36.4 °C) (Axillary)   Resp 12   Wt 149 lb 14.6 oz (68 kg)   SpO2 100%   BMI 25.73 kg/m²      Intake/Output Summary (Last 24 hours) at 2024 1505  Last data filed at 2024 0546  Gross per 24 hour   Intake 543 ml   Output 925 ml   Net -382 ml     Wt Readings from Last 1 Encounters:   24 149 lb 14.6 oz (68 kg)       Exam  Vital signs: Blood pressure 100/54, pulse 60, temperature 97.5 °F (36.4 °C), temperature source Axillary, resp. rate 12, weight 149 lb 14.6 oz (68 kg), SpO2 100%.    General: No acute distress.  Frail, lethargic.  HEENT: Moist mucous membranes. EOMI. PERRLA  Neck:  No JVD.   Respiratory: Clear to auscultation bilaterally.    Cardiovascular: S1, S2.  Regular rate and rhythm.   Abdomen: Soft, nontender, nondistended.    Neurologic: No focal neurological deficits.  Musculoskeletal: Edema  Access:    Results:     Recent Labs   Lab 24  0507 24  1116 24  0448 24  0450   RBC 2.48* 2.76* 2.70* 2.72*   HGB 7.8* 8.4* 8.4* 8.1*   HCT 24.3* 26.7* 26.2* 26.9*   MCV 98.0 96.7 97.0 98.9   MCH 31.5 30.4 31.1 29.8   MCHC 32.1 31.5 32.1 30.1*   RDW 21.5* 22.5* 23.0* 24.3*   NEPRELIM 26.29* 23.31*  --  12.97*   WBC 35.7* 28.7* 20.7* 16.0*   .0 210.0 193.0 175.0         Recent Labs   Lab 24  1116 24  0448 24  0450   * 84 78   BUN 39* 37* 33*   CREATSERUM 1.70* 1.47* 1.28*   CA 8.4* 8.0* 8.1*    145 143   K 3.7 3.6 4.3    * 116* 114*   CO2 19.0* 21.0 22.0          XR CHEST AP PORTABLE  (CPT=71045)    Result Date: 9/7/2024  CONCLUSION: There are findings compatible with given history of CHF.  Slight increase in size of bilateral pleural effusions, larger on the left.  Bibasilar pulmonary opacities likely representing secondary compressive atelectasis.    Dictated by (CST): Maxx Enriquez MD on 9/07/2024 at 9:42 AM     Finalized by (CST): Maxx Enriquez MD on 9/07/2024 at 9:43 AM           Assessment and Plan:     Impression:    GEORGE/cardiorenal.  Creatinine improving  CHFrEF 40-45%/  Fluid overloaded on Bumex  Hypoglycemia/failure to thrive, on dextrose drip  Anemia, IV iron  Sepsis/infected sacral decub.  Broad-spectrum antibiotic      Plan:    Continue Bumex  Noted plan for hospice      Thank you very much for allowing me to participate in the care of your patient.  If you have any questions, please do not hesitate to contact me.     Keerthi Naqvi MD  9/7/2024

## 2024-09-07 NOTE — SLP NOTE
Orders were received for a bedside swallowing evaluation. Spoke to RN prior to entering room. Patient's family waiting to speak to hospice. Swallowing evaluation is not warranted at this time. RN reported if there is a change in plan then re-consult for speech/swallowing will be made.

## 2024-09-07 NOTE — PROGRESS NOTES
[facility]  part of [facility]    Progress Note    Margaret Silverio Patient Status:  Inpatient    3/14/1945 MRN [id number]   Location [facility] 2W/SW Attending Raiza Alvarez MD   Hosp Day # 26 PCP Johnathon Rodriguez,        Subjective:   Margaret Silverio is resting in bed. On 4 liters. Does not open eyes to name. Daughter is at bedside.     Objective:   Blood pressure 116/72, pulse 66, temperature 97.4 °F (36.3 °C), temperature source Axillary, resp. rate 14, weight 149 lb 14.6 oz (68 kg), SpO2 98%.    Physical Exam:    General: No acute distress.   Respiratory: Clear to auscultation bilaterally. No wheezes. No rhonchi.  Cardiovascular: S1, S2. Regular rate and rhythm. No murmurs, rubs or gallops.   Abdomen: Soft, nontender, nondistended.  Positive bowel sounds. No rebound or guarding.  Neurologic: No focal neurological deficits.   Musculoskeletal: Moves all extremities.  Extremities: No edema.    Results:     Lab Results   Component Value Date    WBC 16.0 (H) 2024    HGB 8.1 (L) 2024    HCT 26.9 (L) 2024    .0 2024    CREATSERUM 1.28 (H) 2024    BUN 33 (H) 2024     2024    K 4.3 2024     (H) 2024    CO2 22.0 2024    GLU 78 2024    CA 8.1 (L) 2024    ALB 2.3 (L) 2024    ALKPHO 198 (H) 2024    BILT 0.7 2024    TP 5.5 (L) 2024     (H) 2024    ALT 79 (H) 2024    PTT 39.2 (H) 2024    INR 1.72 (H) 2024    T4F 1.1 2024    TSH 6.866 (H) 2024    LIP 32 2024    ESRML 40 (H) 2024    CRP 9.00 (H) 2024    MG 1.7 2024    PHOS 3.8 2024    B12 >2,000 (H) 2024       XR CHEST AP PORTABLE  (CPT=71045)    Result Date: 2024  CONCLUSION: There are findings compatible with given history of CHF.  Slight increase in size of bilateral pleural effusions, larger on the left.  Bibasilar pulmonary opacities  likely representing secondary compressive atelectasis.    Dictated by (CST): Maxx Enriquez MD on 9/07/2024 at 9:42 AM     Finalized by (CST): Maxx Enriquez MD on 9/07/2024 at 9:43 AM          XR CHEST AP PORTABLE  (CPT=71045)    Result Date: 9/5/2024  CONCLUSION:   No significant change from 09/03/2024.  Moderate cardiomegaly with interstitial pulmonary edema.  Bilateral perihilar and lower lobe alveolar opacity, likely alveolar edema with other superimposed infiltrate less likely.  Unchanged small left greater than right pleural effusions.  Again, right effusion may be loculated       Dictated by (CST): Nadine Blankenship MD on 9/05/2024 at 7:20 AM     Finalized by (CST): Nadine Blankenship MD on 9/05/2024 at 7:22 AM          CT ABDOMEN+PELVIS(CPT=74176)    Result Date: 9/4/2024  CONCLUSION:  1. Limited non-contrast examination, which is further confounded by patient positioning.  2. Long segment/diffuse colonic wall thickening with pericolonic inflammatory stranding.  Findings raise suspicion for infectious or inflammatory colitis.  Please ensure that the patient has recent Clostridium difficile testing.  No definite free intraperitoneal air, well-defined/drainable intra-abdominal collection, pneumatosis intestinalis, or portal venous gas.  No evidence of bowel obstruction. 3. Large sacral decubitus ulcer again identified.  There is edema and phlegmon surrounding the ulcer, but no well-defined/drainable collection to suggest abscess by noncontrast CT.  No definite CT findings of osteomyelitis at the adjacent sacrum or coccyx. 4. Congestive failure and/or fluid overload with loculated moderate left and small to moderate right pleural effusions, severe cardiomegaly, small volume intra-abdominal ascites, and severe anasarca. 5. Coronary and peripheral atherosclerosis.  Partially imaged AICD electrodes as well as mitral valve repair. 6. Aforementioned loculated right pleural effusion demonstrates mild peripheral thickening;  superinfection/empyema cannot be excluded.  Dependent airspace disease in the left greater than right lower lobes is favored to represent compressive atelectasis,  but coexistent infection/pneumonia should be excluded on the basis of clinical parameters. 7. Sludge or vicarious excretion of contrast in the gallbladder.  No evidence of acute cholecystitis. 8. Stable morphologic changes of chronic pancreatitis.  9. Lesser incidental findings as above.   Dictated by (CST): Aleks Diana MD on 9/04/2024 at 2:34 PM     Finalized by (CST): Aleks Diana MD on 9/04/2024 at 2:48 PM              magnesium sulfate  2 g Intravenous Once    vancomycin  15 mg/kg Intravenous Q36H    bumetanide  1 mg Intravenous BID (Diuretic)    potassium chloride  20 mEq Oral BID    pantoprazole  40 mg Intravenous Daily    sodium ferric gluconate  125 mg Intravenous Daily    vancomycin  125 mg Oral q12h    heparin  5,000 Units Subcutaneous Q8H BROOKLYNN    acetaminophen  650 mg Oral Q8H    sacubitril-valsartan  1 tablet Oral BID    metoprolol succinate  25 mg Oral Daily Beta Blocker    [Held by provider] midodrine  5 mg Oral TID    sodium hypochlorite   Topical BID    amiodarone  200 mg Oral Daily    folic acid  800 mcg Oral Daily    gabapentin  300 mg Oral TID       loperamide    HYDROcodone-acetaminophen    HYDROcodone-acetaminophen    HYDROmorphone **OR** [DISCONTINUED] HYDROmorphone **OR** [DISCONTINUED] HYDROmorphone    senna-docusate    polyethylene glycol (PEG 3350)    glucose **OR** glucose **OR** glucose-vitamin C **OR** dextrose **OR** glucose **OR** glucose **OR** glucose-vitamin C    traMADol    ondansetron    metoclopramide    acetaminophen      Assessment and Plan:      Sepsis, acute, present on admission.  Hypotension not present.  Lactic acid level is elevated.  Source of infection unknwon.  Endorgan damage in form of ams.  Tachycardia present.  Tachypnea present.  Hypoxia present.  Fevers/hypothermia present.  Leukocytosis  present.  -Sepsis bolus IV fluids done  -Antibiotics: Continue on oral [place] 4, monitor right pleural effusion   -Blood cultures: ngtd  -ID consult appreciated  -Critical care consult appreciated     Failure to thrive  - had a long talk with daughter  - she is agreeable to inpatient hospice  - I spoke with the hospice nurse to let her know so that the daughter can speak with her today.     Infected sacral decubitus ulcer.  Present on admission.  Status postdebridement.  Positive blood culture with Staph epidermidis, probably contaminant.  -Meropenem, stopped 9/3  -ID and general surgery consults appreciated  -Wound care  -Frequent repositioning  -bedbound      Acute metabolic encephalopathy.  Waxing and waning.    -Reorientation  -Neurology consult appreciated  -Hold Keppra which may be contributing     GEORGE.  Due to infection, volume status.  Initial improvement, now Cr rising slowly.  Her baseline is around 1.2.  -BMP daily   -Monitor urine output  - creatinine improving  - on   -Renal consult    Severe Nonischmeic CM and Severe Pulm HTN  - status post right thoracentesis 7/12/2024  - may need another thoracentesis or chest tube  if not resovevd  - consider consulting IR this weekend for them to do it on Monday      None anion gap metabolic acidosis.  Worsening with bicarb of 11.  Slight worsening of renal function.  ABG shows acidosis with compensatory respiratory alkalosis.  -Nephrology consult  -Repeat BMP daily      Tachypnea.  May be related to acidosis.  Patient denies shortness of breath.  -Chest x-ray and abg noted  -Monitor O2 sats closely  - patient is volume overloaded per Renal  - Bumex 1 mg IV BID   - CXR still shows fluid overload   - patient currently on 4 liters of oxgyen   - continue on bicarb drip     Remote history of seizures.  Has been off Keppra for at least 1 year.  Restarted during this admission initially for concerns of subclinical seizures.  EEG was negative.  Patient now has  intermittent somnolence and confusion which may be related to prolonged hospitalization but there may be some contribution from Keppra.  -Hold Keppra  -Monitor for seizure     Other problems  Sepsis, present on admission, now resolved  Hyponatremia, resolved  UTI  Nonischemic cardiomyopathy  Chronic systolic congestive heart failure  Paroxysmal atrial fibrillation  CKD stage III  Thrombocytosis     DVT Mechanical Prophylaxis:   SCDs,  Heparin       DVT Pharmacologic Prophylaxis   Medication    heparin (Porcine) 5000 UNIT/ML injection 5,000 Units               code status: dnr/Select   dispo: to be determined  malnutrition status at bottom of note         MDM .high FREYA COMBS MD  09/07/24

## 2024-09-08 LAB
ANION GAP SERPL CALC-SCNC: 8 MMOL/L (ref 0–18)
BUN BLD-MCNC: 33 MG/DL (ref 9–23)
BUN/CREAT SERPL: 24.6 (ref 10–20)
CALCIUM BLD-MCNC: 8.9 MG/DL (ref 8.7–10.4)
CHLORIDE SERPL-SCNC: 113 MMOL/L (ref 98–112)
CO2 SERPL-SCNC: 22 MMOL/L (ref 21–32)
CREAT BLD-MCNC: 1.34 MG/DL
DEPRECATED RDW RBC AUTO: 82.1 FL (ref 35.1–46.3)
EGFRCR SERPLBLD CKD-EPI 2021: 41 ML/MIN/1.73M2 (ref 60–?)
ERYTHROCYTE [DISTWIDTH] IN BLOOD BY AUTOMATED COUNT: 24.7 % (ref 11–15)
GLUCOSE BLD-MCNC: 100 MG/DL (ref 70–99)
GLUCOSE BLDC GLUCOMTR-MCNC: 100 MG/DL (ref 70–99)
GLUCOSE BLDC GLUCOMTR-MCNC: 102 MG/DL (ref 70–99)
GLUCOSE BLDC GLUCOMTR-MCNC: 87 MG/DL (ref 70–99)
GLUCOSE BLDC GLUCOMTR-MCNC: 93 MG/DL (ref 70–99)
HCT VFR BLD AUTO: 25.6 %
HGB BLD-MCNC: 7.9 G/DL
MAGNESIUM SERPL-MCNC: 2.1 MG/DL (ref 1.6–2.6)
MCH RBC QN AUTO: 31.1 PG (ref 26–34)
MCHC RBC AUTO-ENTMCNC: 30.9 G/DL (ref 31–37)
MCV RBC AUTO: 100.8 FL
OSMOLALITY SERPL CALC.SUM OF ELEC: 303 MOSM/KG (ref 275–295)
PLATELET # BLD AUTO: 160 10(3)UL (ref 150–450)
POTASSIUM SERPL-SCNC: 4.2 MMOL/L (ref 3.5–5.1)
RBC # BLD AUTO: 2.54 X10(6)UL
SODIUM SERPL-SCNC: 143 MMOL/L (ref 136–145)
WBC # BLD AUTO: 14.7 X10(3) UL (ref 4–11)

## 2024-09-08 PROCEDURE — 99233 SBSQ HOSP IP/OBS HIGH 50: CPT | Performed by: STUDENT IN AN ORGANIZED HEALTH CARE EDUCATION/TRAINING PROGRAM

## 2024-09-08 PROCEDURE — 99232 SBSQ HOSP IP/OBS MODERATE 35: CPT | Performed by: INTERNAL MEDICINE

## 2024-09-08 RX ORDER — ACETAMINOPHEN 10 MG/ML
1000 INJECTION, SOLUTION INTRAVENOUS EVERY 8 HOURS
Status: DISCONTINUED | OUTPATIENT
Start: 2024-09-08 | End: 2024-09-10

## 2024-09-08 NOTE — PROGRESS NOTES
[facility]  part of [facility]    Progress Note      Assessment and Plan:   1.  Sepsis-clinically stabilizing.  Yet on antibiotic.  No plans to tap loculated effusion.    Recommendations: Continue current management.    2.  Goals of care-patient is slowly dying with multisystem organ failure.    Recommendations: Await hospice evaluation    3.  Infected sacral decubitus ulcer status postdebridement-as per wound care service, ID and general surgery    4.  GEORGE-stabilizing    5.  Severe nonischemic cardiomyopathy with pulmonary hypertension    6.  CODE STATUS-DNAR select    7.  DVT prophylaxis-subcutaneous heparin        Subjective:   Margaret Silverio is a(n) 79 year old female who is arousable to verbal response    Objective:   Blood pressure 134/62, pulse 60, temperature 97.6 °F (36.4 °C), temperature source Axillary, resp. rate 22, weight 152 lb (68.9 kg), SpO2 100%.    Physical Exam lethargic elderly black female   HEENT examination is unremarkable with pupils equal round and reactive to light and accommodation.   Neck without adenopathy, thyromegaly, JVD nor bruit.   Lungs diminished to auscultation and percussion.  Cardiac regular rate and rhythm no murmur.   Abdomen nontender, without hepatosplenomegaly and no mass appreciable.   Extremities without clubbing cyanosis with 1-2+ extremity edema.   Neurologic with diffuse weakness.  Skin with skin breakdown     Results:     Lab Results   Component Value Date    WBC 14.7 09/08/2024    HGB 7.9 09/08/2024    HCT 25.6 09/08/2024    .0 09/08/2024    CREATSERUM 1.34 09/08/2024    BUN 33 09/08/2024     09/08/2024    K 4.2 09/08/2024     09/08/2024    CO2 22.0 09/08/2024     09/08/2024    CA 8.9 09/08/2024    MG 2.1 09/08/2024     Chest x-ray-There are findings compatible with given history of CHF.  Slight increase in size of bilateral pleural effusions, larger on the left.  Bibasilar pulmonary opacities likely representing  secondary compressive atelectasis.     Aroldo Pacheco MD  Medical Director, Critical Care, [facility]  Medical Director, [facility]  Pager: [phone]

## 2024-09-08 NOTE — PHYSICAL THERAPY NOTE
Discussed with RN, pt remains unable to participate in therapy, pending hospice. Will sign off, thank you.     Sandrita Luo, PT

## 2024-09-08 NOTE — PLAN OF CARE
Problem: Patient Centered Care  Goal: Patient preferences are identified and integrated in the patient's plan of care  Description: Interventions:  - What would you like us to know as we care for you?   - Provide timely, complete, and accurate information to patient/family  - Incorporate patient and family knowledge, values, beliefs, and cultural backgrounds into the planning and delivery of care  - Encourage patient/family to participate in care and decision-making at the level they choose  - Honor patient and family perspectives and choices  Outcome: Not Progressing     Problem: Patient/Family Goals  Goal: Patient/Family Long Term Goal  Description: Patient's Long Term Goal:     Interventions:  -   - See additional Care Plan goals for specific interventions  Outcome: Not Progressing  Goal: Patient/Family Short Term Goal  Description: Patient's Short Term Goal:     Interventions:   -   - See additional Care Plan goals for specific interventions  Outcome: Not Progressing     Problem: PAIN - ADULT  Goal: Verbalizes/displays adequate comfort level or patient's stated pain goal  Description: INTERVENTIONS:  - Encourage pt to monitor pain and request assistance  - Assess pain using appropriate pain scale  - Administer analgesics based on type and severity of pain and evaluate response  - Implement non-pharmacological measures as appropriate and evaluate response  - Consider cultural and social influences on pain and pain management  - Manage/alleviate anxiety  - Utilize distraction and/or relaxation techniques  - Monitor for opioid side effects  - Notify MD/LIP if interventions unsuccessful or patient reports new pain  - Anticipate increased pain with activity and pre-medicate as appropriate  Outcome: Not Progressing     Problem: RISK FOR INFECTION - ADULT  Goal: Absence of fever/infection during anticipated neutropenic period  Description: INTERVENTIONS  - Monitor WBC  - Administer growth factors as ordered  -  Implement neutropenic guidelines  Outcome: Not Progressing     Problem: SKIN/TISSUE INTEGRITY - ADULT  Goal: Skin integrity remains intact  Description: INTERVENTIONS  - Assess and document risk factors for pressure ulcer development  - Assess and document skin integrity  - Monitor for areas of redness and/or skin breakdown  - Initiate interventions, skin care algorithm/standards of care as needed  Outcome: Not Progressing  Goal: Incision(s), wounds(s) or drain site(s) healing without S/S of infection  Description: INTERVENTIONS:  - Assess and document risk factors for pressure ulcer development  - Assess and document skin integrity  - Assess and document dressing/incision, wound bed, drain sites and surrounding tissue  - Implement wound care per orders  - Initiate isolation precautions as appropriate  - Initiate Pressure Ulcer prevention bundle as indicated  Outcome: Not Progressing  Goal: Oral mucous membranes remain intact  Description: INTERVENTIONS  - Assess oral mucosa and hygiene practices  - Implement preventative oral hygiene regimen  - Implement oral medicated treatments as ordered  Outcome: Not Progressing     Problem: SAFETY ADULT - FALL  Goal: Free from fall injury  Description: INTERVENTIONS:  - Assess pt frequently for physical needs  - Identify cognitive and physical deficits and behaviors that affect risk of falls.  - [place] fall precautions as indicated by assessment.  - Educate pt/family on patient safety including physical limitations  - Instruct pt to call for assistance with activity based on assessment  - Modify environment to reduce risk of injury  - Provide assistive devices as appropriate  - Consider OT/PT consult to assist with strengthening/mobility  - Encourage toileting schedule  Outcome: Not Progressing     Problem: RESPIRATORY - ADULT  Goal: Achieves optimal ventilation and oxygenation  Description: INTERVENTIONS:  - Assess for changes in respiratory status  - Assess for changes in  mentation and behavior  - Position to facilitate oxygenation and minimize respiratory effort  - Oxygen supplementation based on oxygen saturation or ABGs  - Provide Smoking Cessation handout, if applicable  - Encourage broncho-pulmonary hygiene including cough, deep breathe, Incentive Spirometry  - Assess the need for suctioning and perform as needed  - Assess and instruct to report SOB or any respiratory difficulty  - Respiratory Therapy support as indicated  - Manage/alleviate anxiety  - Monitor for signs/symptoms of CO2 retention  Outcome: Not Progressing

## 2024-09-08 NOTE — PLAN OF CARE
Fall precautions in place, safety precautions in place. Bed in lowest setting, locked with bed alarm on. Patient educated and encouraged to use call light as needed overnight. Frequent rounding and repositioning performed overnight by nursing staff. Pain medications given as ordered.     Problem: Patient Centered Care  Goal: Patient preferences are identified and integrated in the patient's plan of care  Description: Interventions:  - What would you like us to know as we care for you? From home with spouse   - Provide timely, complete, and accurate information to patient/family  - Incorporate patient and family knowledge, values, beliefs, and cultural backgrounds into the planning and delivery of care  - Encourage patient/family to participate in care and decision-making at the level they choose  - Honor patient and family perspectives and choices  Outcome: Progressing     Problem: Patient/Family Goals  Goal: Patient/Family Long Term Goal  Description: Patient's Long Term Goal: To discharge    Interventions:   -Monitor vitals  -Monitor labs  -Follow MD orders  -Administer as needed medications per order  -Diagnostic scans/interventions as recommended   - See additional Care Plan goals for specific interventions  Outcome: Progressing  Goal: Patient/Family Short Term Goal  Description: Patient's Short Term Goal: To feel better     Interventions:   -Assess for pain  -Administer prn medication as ordered  -Decrease external stimuli  -Reposition as needed/ordered  -Frequent rounding   - See additional Care Plan goals for specific interventions  Outcome: Progressing     Problem: PAIN - ADULT  Goal: Verbalizes/displays adequate comfort level or patient's stated pain goal  Description: INTERVENTIONS:  - Encourage pt to monitor pain and request assistance  - Assess pain using appropriate pain scale  - Administer analgesics based on type and severity of pain and evaluate response  - Implement non-pharmacological measures as  appropriate and evaluate response  - Consider cultural and social influences on pain and pain management  - Manage/alleviate anxiety  - Utilize distraction and/or relaxation techniques  - Monitor for opioid side effects  - Notify MD/LIP if interventions unsuccessful or patient reports new pain  - Anticipate increased pain with activity and pre-medicate as appropriate  Outcome: Not Progressing     Problem: RISK FOR INFECTION - ADULT  Goal: Absence of fever/infection during anticipated neutropenic period  Description: INTERVENTIONS  - Monitor WBC  - Administer growth factors as ordered  - Implement neutropenic guidelines  Outcome: Progressing     Problem: SAFETY ADULT - FALL  Goal: Free from fall injury  Description: INTERVENTIONS:  - Assess pt frequently for physical needs  - Identify cognitive and physical deficits and behaviors that affect risk of falls.  - [place] fall precautions as indicated by assessment.  - Educate pt/family on patient safety including physical limitations  - Instruct pt to call for assistance with activity based on assessment  - Modify environment to reduce risk of injury  - Provide assistive devices as appropriate  - Consider OT/PT consult to assist with strengthening/mobility  - Encourage toileting schedule  Outcome: Progressing     Problem: SKIN/TISSUE INTEGRITY - ADULT  Goal: Skin integrity remains intact  Description: INTERVENTIONS  - Assess and document risk factors for pressure ulcer development  - Assess and document skin integrity  - Monitor for areas of redness and/or skin breakdown  - Initiate interventions, skin care algorithm/standards of care as needed  Outcome: Progressing  Goal: Incision(s), wounds(s) or drain site(s) healing without S/S of infection  Description: INTERVENTIONS:  - Assess and document risk factors for pressure ulcer development  - Assess and document skin integrity  - Assess and document dressing/incision, wound bed, drain sites and surrounding tissue  -  Implement wound care per orders  - Initiate isolation precautions as appropriate  - Initiate Pressure Ulcer prevention bundle as indicated  Outcome: Progressing  Goal: Oral mucous membranes remain intact  Description: INTERVENTIONS  - Assess oral mucosa and hygiene practices  - Implement preventative oral hygiene regimen  - Implement oral medicated treatments as ordered  Outcome: Progressing     Problem: RESPIRATORY - ADULT  Goal: Achieves optimal ventilation and oxygenation  Description: INTERVENTIONS:  - Assess for changes in respiratory status  - Assess for changes in mentation and behavior  - Position to facilitate oxygenation and minimize respiratory effort  - Oxygen supplementation based on oxygen saturation or ABGs  - Provide Smoking Cessation handout, if applicable  - Encourage broncho-pulmonary hygiene including cough, deep breathe, Incentive Spirometry  - Assess the need for suctioning and perform as needed  - Assess and instruct to report SOB or any respiratory difficulty  - Respiratory Therapy support as indicated  - Manage/alleviate anxiety  - Monitor for signs/symptoms of CO2 retention  Outcome: Progressing     Problem: CARDIOVASCULAR - ADULT  Goal: Maintains optimal cardiac output and hemodynamic stability  Description: INTERVENTIONS:  - Monitor vital signs, rhythm, and trends  - Monitor for bleeding, hypotension and signs of decreased cardiac output  - Evaluate effectiveness of vasoactive medications to optimize hemodynamic stability  - Monitor arterial and/or venous puncture sites for bleeding and/or hematoma  - Assess quality of pulses, skin color and temperature  - Assess for signs of decreased coronary artery perfusion - ex. Angina  - Evaluate fluid balance, assess for edema, trend weights  Outcome: Progressing  Goal: Absence of cardiac arrhythmias or at baseline  Description: INTERVENTIONS:  - Continuous cardiac monitoring, monitor vital signs, obtain 12 lead EKG if indicated  - Evaluate  effectiveness of antiarrhythmic and heart rate control medications as ordered  - Initiate emergency measures for life threatening arrhythmias  - Monitor electrolytes and administer replacement therapy as ordered  Outcome: Progressing     Problem: METABOLIC/FLUID AND ELECTROLYTES - ADULT  Goal: Glucose maintained within prescribed range  Description: INTERVENTIONS:  - Monitor Blood Glucose as ordered  - Assess for signs and symptoms of hyperglycemia and hypoglycemia  - Administer ordered medications to maintain glucose within target range  - Assess barriers to adequate nutritional intake and initiate nutrition consult as needed  - Instruct patient on self management of diabetes  Outcome: Progressing  Goal: Electrolytes maintained within normal limits  Description: INTERVENTIONS:  - Monitor labs and rhythm and assess patient for signs and symptoms of electrolyte imbalances  - Administer electrolyte replacement as ordered  - Monitor response to electrolyte replacements, including rhythm and repeat lab results as appropriate  - Fluid restriction as ordered  - Instruct patient on fluid and nutrition restrictions as appropriate  Outcome: Progressing  Goal: Hemodynamic stability and optimal renal function maintained  Description: INTERVENTIONS:  - Monitor labs and assess for signs and symptoms of volume excess or deficit  - Monitor intake, output and patient weight  - Monitor urine specific gravity, serum osmolarity and serum sodium as indicated or ordered  - Monitor response to interventions for patient's volume status, including labs, urine output, blood pressure (other measures as available)  - Encourage oral intake as appropriate  - Instruct patient on fluid and nutrition restrictions as appropriate  Outcome: Progressing     Problem: Delirium  Goal: Minimize duration of delirium  Description: Interventions:  - Encourage use of hearing aids, eye glasses  - Promote highest level of mobility daily  - Provide frequent  reorientation  - Promote wakefulness i.e. lights on, blinds open  - Promote sleep, encourage patient's normal rest cycle i.e. lights off, TV off, minimize noise and interruptions  - Encourage family to assist in orientation and promotion of home routines  Outcome: Progressing     Problem: Diabetes/Glucose Control  Goal: Glucose maintained within prescribed range  Description: INTERVENTIONS:  - Monitor Blood Glucose as ordered  - Assess for signs and symptoms of hyperglycemia and hypoglycemia  - Administer ordered medications to maintain glucose within target range  - Assess barriers to adequate nutritional intake and initiate nutrition consult as needed  - Instruct patient on self management of diabetes  Outcome: Progressing

## 2024-09-08 NOTE — SLP NOTE
Speech Pathology Service    Reviewed chart for recent events and d/w RN. Patient remains with severe somnolence and not appropriate for clinical bedside swallow evaluation at this time, pending possible hospice transfer. Will continue to follow up 9/9/24 as available/appropriate.     Meghan Bender M.S. CCC-SLP  Speech-Language Pathologist  [id number]

## 2024-09-08 NOTE — PROGRESS NOTES
[facility]  part of [facility]    Progress Note    Margaret Silverio Patient Status:  Inpatient    3/14/1945 MRN [id number]   Location [facility]5W Attending Raiza Alvarez MD   Hosp Day # 27 PCP Johnathon Rodriguez, DO       Subjective:   Margaret Silverio is a(n) 79 year old female who I am seeing for   GEORGE/cardiorenal    On O2 and unresponsive  No p.o. intake    Objective:   /62 (BP Location: Right arm)   Pulse 60   Temp 97.7 °F (36.5 °C) (Axillary)   Resp 18   Wt 152 lb (68.9 kg)   SpO2 100%   BMI 26.09 kg/m²      Intake/Output Summary (Last 24 hours) at 2024 1237  Last data filed at 2024 0530  Gross per 24 hour   Intake 568.4 ml   Output 850 ml   Net -281.6 ml     Wt Readings from Last 1 Encounters:   24 152 lb (68.9 kg)       Exam  Vital signs: Blood pressure 121/62, pulse 60, temperature 97.7 °F (36.5 °C), temperature source Axillary, resp. rate 18, weight 152 lb (68.9 kg), SpO2 100%.    General: No acute distress.  Frail, lethargic.  HEENT: Moist mucous membranes. EOMI. PERRLA  Neck:  No JVD.   Respiratory: Clear to auscultation bilaterally.    Cardiovascular: S1, S2.  Regular rate and rhythm.   Abdomen: Soft, nontender, nondistended.    Neurologic: No focal neurological deficits.  Musculoskeletal: Edema  Access:    Results:     Recent Labs   Lab 24  0507 24  1116 24  0448 24  0450 24  0559   RBC 2.48* 2.76* 2.70* 2.72* 2.54*   HGB 7.8* 8.4* 8.4* 8.1* 7.9*   HCT 24.3* 26.7* 26.2* 26.9* 25.6*   MCV 98.0 96.7 97.0 98.9 100.8*   MCH 31.5 30.4 31.1 29.8 31.1   MCHC 32.1 31.5 32.1 30.1* 30.9*   RDW 21.5* 22.5* 23.0* 24.3* 24.7*   NEPRELIM 26.29* 23.31*  --  12.97*  --    WBC 35.7* 28.7* 20.7* 16.0* 14.7*   .0 210.0 193.0 175.0 160.0         Recent Labs   Lab 24  0448 24  0450 24  0559   GLU 84 78 100*   BUN 37* 33* 33*   CREATSERUM 1.47* 1.28* 1.34*   CA 8.0* 8.1* 8.9    143 143   K 3.6 4.3  4.2   * 114* 113*   CO2 21.0 22.0 22.0          XR CHEST AP PORTABLE  (CPT=71045)    Result Date: 9/7/2024  CONCLUSION: There are findings compatible with given history of CHF.  Slight increase in size of bilateral pleural effusions, larger on the left.  Bibasilar pulmonary opacities likely representing secondary compressive atelectasis.    Dictated by (CST): Maxx Enriquez MD on 9/07/2024 at 9:42 AM     Finalized by (CST): Maxx Enriquez MD on 9/07/2024 at 9:43 AM           Assessment and Plan:     Impression:    GEORGE/cardiorenal.  Creatinine improving  CHFrEF 40-45%/  Fluid overloaded on Bumex  Hypoglycemia/failure to thrive, on dextrose drip  Anemia, IV iron  Sepsis/infected sacral decub.  Broad-spectrum antibiotic      Plan:    Continue Bumex, increase with 3 times daily  Continue dextrose drip to avoid hypoglycemia  Noted plan for hospice      Thank you very much for allowing me to participate in the care of your patient.  If you have any questions, please do not hesitate to contact me.     Keerthi Naqvi MD

## 2024-09-08 NOTE — PROGRESS NOTES
Progress Note     Margaret Silverio Patient Status:  Inpatient    3/14/1945 MRN [id number]   Location [facility]5W Attending Allyssa Longo MD   Hosp Day # 27 PCP Johnathon Rodriguez DO     Subjective:   S: Patient still minimally interactive, groans on exam, on 4L NC.   Awaiting hospice eval.    Review of Systems:   10 point ROS completed and was negative, except for pertinent positive and negatives stated in subjective.    Objective:   Vital signs:  Temp:  [97.4 °F (36.3 °C)-97.8 °F (36.6 °C)] 97.4 °F (36.3 °C)  Pulse:  [60] 60  Resp:  [12-20] 20  BP: (111-132)/(59-76) 132/69  SpO2:  [98 %-100 %] 100 %    Wt Readings from Last 6 Encounters:   24 152 lb (68.9 kg)   24 112 lb 1.6 oz (50.8 kg)   24 115 lb 3.2 oz (52.3 kg)   24 124 lb 12.8 oz (56.6 kg)   24 136 lb (61.7 kg)   24 129 lb 9.6 oz (58.8 kg)         Physical Exam:    General: eldelry female, ill appearing, somnolent   Respiratory: Clear to auscultation bilaterally. No wheezes. No rhonchi.  Cardiovascular: S1, S2. Regular rate and rhythm. No murmurs, rubs or gallops.   Abdomen: Soft, nontender, nondistended.  Positive bowel sounds. No rebound or guarding.  : rivero in place  Neurologic: No focal neurological deficits.   Musculoskeletal: Moves all extremities.  Extremities: Bilateral UE swollen     Results:   Diagnostic Data:      Labs:    Labs Last 24 Hours:   BMP     CBC    Other     Na 143 Cl 113 BUN 33 Glu 100   Hb 7.9   PTT - Procal -   K 4.2 CO2 22.0 Cr 1.34   WBC 14.7 >< .0  INR - CRP -   Renal Lytes Endo    Hct 25.6   Trop - D dim -   eGFR - Ca 8.9 POC Gluc  93    LFT   pBNP - Lactic -   eGFR AA - PO4 - A1c -   AST - APk - Prot -  LDL -     Mg 2.1 TSH -   ALT - T desirae - Alb -        COVID-19 Lab Results    COVID-19  Lab Results   Component Value Date    COVID19 Not Detected 2024    COVID19 Not Detected 2024    COVID19 Not Detected 2023       Pro-Calcitonin  Recent Labs   Lab  09/03/24 2125   PCT 3.79*       Cardiac  No results for input(s): \"TROP\", \"PBNP\" in the last 168 hours.    Creatinine Kinase  No results for input(s): \"CK\" in the last 168 hours.    Inflammatory Markers  No results for input(s): \"CRP\", \"FRANNY\", \"LDH\", \"DDIMER\" in the last 168 hours.    Imaging: Imaging data reviewed in Epic.    Medications:    acetaminophen  1,000 mg Intravenous Q8H    vancomycin  15 mg/kg Intravenous Q36H    bumetanide  1 mg Intravenous BID (Diuretic)    potassium chloride  20 mEq Oral BID    pantoprazole  40 mg Intravenous Daily    vancomycin  125 mg Oral q12h    heparin  5,000 Units Subcutaneous Q8H BROOKLYNN    sacubitril-valsartan  1 tablet Oral BID    metoprolol succinate  25 mg Oral Daily Beta Blocker    [Held by provider] midodrine  5 mg Oral TID    sodium hypochlorite   Topical BID    amiodarone  200 mg Oral Daily    folic acid  800 mcg Oral Daily    gabapentin  300 mg Oral TID       Assessment & Plan:   ASSESSMENT / PLAN:     Sepsis, acute, present on admission.  Hypotension not present.  Lactic acid level is elevated.  Source of infection unknwon.  End organ damage in form of ams.  Tachycardia present.  Tachypnea present.  Hypoxia present.  Fevers/hypothermia present.  Leukocytosis present.  -CT A/P with infectious/inflammatory colitis ?ischemic, loculated R pleural effusion   -Antibiotics: Continue on IV vanc and OVP, monitor right pleural effusion   -Blood cultures: ngtd  -ID consult appreciated  -Critical care consult appreciated      Failure to thrive  - Dr. Alvarez discussed with daughter, agreeable to inpatient hospice  - hospice consulted  - on dextrose drip     Infected sacral decubitus ulcer.  Present on admission.  Status postdebridement.  Positive blood culture with Staph epidermidis, probably contaminant.  -Meropenem, stopped 9/3  -ID and general surgery consults appreciated  -Wound care  -Frequent repositioning  -bedbound      Acute metabolic encephalopathy.  Waxing and waning.    -  Heat CT unremarkable   -Neurology consult appreciated  -Hold Keppra which may be contributing     GEORGE/cardiorenal.  Due to infection, volume status.  Her baseline is around 1.2.  -BMP daily   -Monitor urine output  - creatinine improving  -Renal consult     Severe Nonischmeic CM and Severe Pulm HTN  - status post right thoracentesis 7/12/2024 with negative cytology and culture, likely 2/2 CHF and CKD      None anion gap metabolic acidosis.  Worsening with bicarb of 11.  Slight worsening of renal function.  ABG shows acidosis with compensatory respiratory alkalosis.  -Nephrology consult  -Repeat BMP daily      Tachypnea.  May be related to acidosis.  Patient denies shortness of breath.  -Chest x-ray and abg noted  -Monitor O2 sats closely  - patient is volume overloaded per Renal  - Bumex 1 mg IV BID   - CXR still shows fluid overload   - patient currently on 4 liters of oxgyen   - continue on bicarb drip      Remote history of seizures.  Has been off Keppra for at least 1 year.  Restarted during this admission initially for concerns of subclinical seizures.  EEG was negative.  Patient now has intermittent somnolence and confusion which may be related to prolonged hospitalization but there may be some contribution from Keppra.  -Hold Keppra  -Monitor for seizure     Other problems  Sepsis, present on admission, now resolved  Hyponatremia, resolved  UTI  Nonischemic cardiomyopathy  Chronic systolic congestive heart failure  Paroxysmal atrial fibrillation  CKD stage III  Thrombocytosis     DVT Mechanical Prophylaxis:   SCDs,  Heparin       Dispo: pending inpatient hospice eval         MDM: High   I personally spent time on chart/note review, review of labs/imaging, discussion with patient, physical exam, discussion with staff, consultants, coordinating care, writing progress note, and discussion of plan of care.      Allyssa Longo MD    Supplementary Documentation:

## 2024-09-08 NOTE — HOSPICE RN NOTE
[facility] stopped by room, daughter Barbi not at bedside. Attempted to call Barbi again, no answer. Informed bedside RN to notify  when daughter Barbi arrives. We will continue to follow.     [facility] spoke with Barbi outside of patient's room. Barbi states that she understands her mother's poor condition. She is hoping her father is at that point of understanding, as well. Barbi overall agrees with hospice care for her mother at this time. She would like to speak with her father before making a decision on hospice. Barbi asked  to follow up with her tomorrow.    Angy Ventura RN, BSN  Transitional Nurse Liaison  [facility]  [phone] (After-hours)

## 2024-09-09 LAB
GLUCOSE BLDC GLUCOMTR-MCNC: 107 MG/DL (ref 70–99)
GLUCOSE BLDC GLUCOMTR-MCNC: 108 MG/DL (ref 70–99)
GLUCOSE BLDC GLUCOMTR-MCNC: 108 MG/DL (ref 70–99)
GLUCOSE BLDC GLUCOMTR-MCNC: 109 MG/DL (ref 70–99)
VANCOMYCIN TROUGH SERPL-MCNC: 22.8 UG/ML (ref 10–20)

## 2024-09-09 PROCEDURE — 99232 SBSQ HOSP IP/OBS MODERATE 35: CPT | Performed by: INTERNAL MEDICINE

## 2024-09-09 PROCEDURE — 99233 SBSQ HOSP IP/OBS HIGH 50: CPT | Performed by: HOSPITALIST

## 2024-09-09 NOTE — PROGRESS NOTES
Daily Pulmonary/ICU/Critical Care Progress Note          SUBJECTIVE:  Awake, teary but denies pain.      ALLERGIES:  Allergies   Allergen Reactions    Lisinopril Coughing         MEDS:  Home Medications:  Outpatient Medications Marked as Taking for the 8/12/24 encounter (Hospital Encounter)   Medication Sig Dispense Refill    bumetanide 1 MG Oral Tab Take 1 tablet (1 mg total) by mouth daily. 30 tablet 2    carvedilol 6.25 MG Oral Tab Take 0.5 tablets (3.125 mg total) by mouth 2 (two) times daily with meals. 30 tablet 2    sennosides (SENNA-TIME) 8.6 MG Oral Tab senna 8.6 mg tablet, [RxNorm: 940831]      cyclobenzaprine 10 MG Oral Tab Take 1 tablet (10 mg total) by mouth nightly. 30 tablet 1    fentaNYL 12 MCG/HR Transdermal Patch 72 Hr Place 1 patch onto the skin every third day. (Patient taking differently: Place 1 patch onto the skin every third day. Placed 8/10 left arm) 10 patch 0    midodrine 5 MG Oral Tab Take 1 tablet (5 mg total) by mouth 2 (two) times daily before meals. 60 tablet 0    senna-docusate 8.6-50 MG Oral Tab Take 2 tablets by mouth daily. 30 tablet 0    isosorbide dinitrate 20 MG Oral Tab Take 1 tablet (20 mg total) by mouth TID (Nitrates). 90 tablet 3    gabapentin 300 MG Oral Cap Take 1 capsule (300 mg total) by mouth 3 (three) times daily. 90 capsule 0    dapagliflozin (FARXIGA) 10 MG Oral Tab Take 1 tablet (10 mg total) by mouth daily. 30 tablet 5    spironolactone 25 MG Oral Tab       alendronate 70 MG Oral Tab Take 1 tablet (70 mg total) by mouth once a week. (Patient taking differently: Take 1 tablet (70 mg total) by mouth once a week. Every Tuesday) 12 tablet 3    methotrexate 2.5 MG Oral Tab TAKE 6 TABLETS BY MOUTH 1 TIME A WEEK 77 tablet 0    lidocaine-menthol 4-1 % External Patch Place 1 patch onto the skin daily. 30 patch 0    PANTOPRAZOLE 40 MG Oral Tab EC TAKE 1 TABLET(40 MG) BY MOUTH TWICE DAILY BEFORE MEALS 180 tablet 0    SACUBITRIL-VALSARTAN 49-51 MG Oral Tab Take 1 tablet by  mouth 2 (two) times daily. 60 tablet 1    folic acid 800 MCG Oral Tab Take 1 tablet (800 mcg total) by mouth daily. 90 tablet 0    amiodarone 200 MG Oral Tab Take 1 tablet (200 mg total) by mouth daily.      ferrous sulfate 325 (65 FE) MG Oral Tab EC Take 1 tablet (325 mg total) by mouth daily with breakfast.       Scheduled Medication:   acetaminophen  1,000 mg Intravenous Q8H    vancomycin  15 mg/kg Intravenous Q36H    bumetanide  1 mg Intravenous BID (Diuretic)    potassium chloride  20 mEq Oral BID    pantoprazole  40 mg Intravenous Daily    vancomycin  125 mg Oral q12h    heparin  5,000 Units Subcutaneous Q8H BROOKLYNN    sacubitril-valsartan  1 tablet Oral BID    metoprolol succinate  25 mg Oral Daily Beta Blocker    [Held by provider] midodrine  5 mg Oral TID    sodium hypochlorite   Topical BID    amiodarone  200 mg Oral Daily    folic acid  800 mcg Oral Daily    gabapentin  300 mg Oral TID     Continuous Infusing Medication:   dextrose 42 mL/hr at 09/09/24 0103     PRN Medications:    loperamide    HYDROcodone-acetaminophen    HYDROcodone-acetaminophen    HYDROmorphone **OR** [DISCONTINUED] HYDROmorphone **OR** [DISCONTINUED] HYDROmorphone    senna-docusate    polyethylene glycol (PEG 3350)    glucose **OR** glucose **OR** glucose-vitamin C **OR** dextrose **OR** glucose **OR** glucose **OR** glucose-vitamin C    traMADol    ondansetron    metoclopramide    acetaminophen       PHYSICAL EXAM:  /71 (BP Location: Right arm)   Pulse 60   Temp 97.4 °F (36.3 °C) (Axillary)   Resp 16   Wt 155 lb (70.3 kg)   SpO2 99%   BMI 26.61 kg/m²    CONSTITUTIONAL: alert, awake, teary   HEENT: atraumatic normocephalic  MOUTH: mucous membranes are moist. No OP exudates  NECK/THROAT: no JVD. Trachea midline. No obvious thyromegaly  LUNG: clear upper with diminished at bases. No wheezing. Chest symmetric with respiratory motion  HEART: regular rate and rhythm, + m  ABD: soft non tender. + bowel sounds. No organomegaly  noted  EXT: no clubbing, cyanosis. + b/l LE edema noted      IMAGES:   Reviewed in EMR      LABS:  Recent Labs   Lab 09/04/24  0507 09/05/24  1116 09/06/24 0448 09/07/24  0450 09/08/24  0559   RBC 2.48* 2.76* 2.70* 2.72* 2.54*   HGB 7.8* 8.4* 8.4* 8.1* 7.9*   HCT 24.3* 26.7* 26.2* 26.9* 25.6*   MCV 98.0 96.7 97.0 98.9 100.8*   MCH 31.5 30.4 31.1 29.8 31.1   MCHC 32.1 31.5 32.1 30.1* 30.9*   RDW 21.5* 22.5* 23.0* 24.3* 24.7*   NEPRELIM 26.29* 23.31*  --  12.97*  --    WBC 35.7* 28.7* 20.7* 16.0* 14.7*   .0 210.0 193.0 175.0 160.0       Recent Labs   Lab 09/05/24  1116 09/06/24 0448 09/07/24  0450 09/08/24  0559   * 84 78 100*   BUN 39* 37* 33* 33*   CREATSERUM 1.70* 1.47* 1.28* 1.34*   EGFRCR 31* 36* 43* 41*   CA 8.4* 8.0* 8.1* 8.9   ALB 2.7* 2.4* 2.3*  --     145 143 143   K 3.7 3.6 4.3 4.2   * 116* 114* 113*   CO2 19.0* 21.0 22.0 22.0   ALKPHO 238* 219* 198*  --    * 236* 138*  --    ALT 67* 99* 79*  --    BILT 0.9 0.8 0.7  --    TP 6.1 5.5* 5.5*  --        ASSESSMENT/PLAN:  1. Severe sepsis resolved now  Possible intra-abdominal source vs pulm source. Ct abd/pelvis reviewed   Infected decubitus  Patient already on broad-spectrum antibiotics  ID following  If needs pleural effusion drained (thora vs chest tube) then would recommend consulting IR as would be difficult to perform at bedside  WBC improving      2. Infected sacral decubitus ulcer  Presented on admission  S/p surgical debridement  ID and surgery following  Wound care following     3. Lactic and metabolic acidosis  With underlying acute on chronic kidney injury  S/p on bicarb drip and renal following/managing  Now on d10 for hypoglycemia    4. Severe nonischemic cardiomyopathy with severe pulmonary hypertension  Significant cardiomegaly on CXR with chronic basilar effusion  S/p right thoracentesis 7/12/2024 negative cytology and culture likely secondary to CHF and CKD     5. Acute toxic and metabolic  encephalopathy  Unresponsive has resolved  Head CT negative  Supportive care     6. Severe rheumatoid arthritis with significant joint deformities  Bedbound with chronic sacral decub     7. Acute on chronic respiratory failure with hypoxia  O2 therapy. Now on 4 LNC with sats in upper 90s     8. DVT prophylaxis  Heparin subcu     9. DNAR/select  Prognosis is guarded and further setting of limited appropriate  Palliative care following. GOC discussion ongoing    Will follow.     Thank you for the opportunity to care for Margaret Massey DO, MPH  Pulmonary Critical Care Medicine  [facility]

## 2024-09-09 NOTE — PROGRESS NOTES
Progress Note     Margaret Silverio Patient Status:  Inpatient    3/14/1945 MRN [id number]   Location [facility]5W Attending Allyssa Longo MD   Hosp Day # 28 PCP Johnathon Rodriguez,      Subjective:   S: Patient is awake, answering simple questions appropriately.  She states that she is not in any pain and not short of breath.  She is asking me how I am doing    Review of Systems:   10 point ROS completed and was negative, except for pertinent positive and negatives stated in subjective.    Objective:   Vital signs:  Temp:  [97.4 °F (36.3 °C)-98 °F (36.7 °C)] 97.4 °F (36.3 °C)  Pulse:  [60] 60  Resp:  [16-22] 16  BP: (132-143)/(62-71) 140/71  SpO2:  [98 %-100 %] 99 %    Wt Readings from Last 6 Encounters:   24 155 lb (70.3 kg)   24 112 lb 1.6 oz (50.8 kg)   24 115 lb 3.2 oz (52.3 kg)   24 124 lb 12.8 oz (56.6 kg)   24 136 lb (61.7 kg)   24 129 lb 9.6 oz (58.8 kg)         Physical Exam:    General: eldelry female, ill appearing, alert  Respiratory: Clear to auscultation bilaterally. No wheezes. No rhonchi.  Cardiovascular: S1, S2. Regular rate and rhythm. No murmurs, rubs or gallops.   Abdomen: Soft, nontender, nondistended.  Positive bowel sounds. No rebound or guarding.  : rivero in place  Neurologic: No focal neurological deficits.   Musculoskeletal: Moves all extremities.  Extremities: Bilateral UE swollen     Results:   Diagnostic Data:      Labs:    Labs Last 24 Hours:   BMP     CBC    Other     Na - Cl - BUN - Glu -   Hb -   PTT - Procal -   K - CO2 - Cr -   WBC - >< PLT -  INR - CRP -   Renal Lytes Endo    Hct -   Trop - D dim -   eGFR - Ca - POC Gluc  107    LFT   pBNP - Lactic -   eGFR AA - PO4 - A1c -   AST - APk - Prot -  LDL -     Mg - TSH -   ALT - T desirae - Alb -        COVID-19 Lab Results    COVID-19  Lab Results   Component Value Date    COVID19 Not Detected 2024    COVID19 Not Detected 2024    COVID19 Not Detected 2023        Pro-Calcitonin  Recent Labs   Lab 09/03/24  2125   PCT 3.79*       Cardiac  No results for input(s): \"TROP\", \"PBNP\" in the last 168 hours.    Creatinine Kinase  No results for input(s): \"CK\" in the last 168 hours.    Inflammatory Markers  No results for input(s): \"CRP\", \"FRANNY\", \"LDH\", \"DDIMER\" in the last 168 hours.    Imaging: Imaging data reviewed in Epic.    Medications:    acetaminophen  1,000 mg Intravenous Q8H    vancomycin  15 mg/kg Intravenous Q36H    bumetanide  1 mg Intravenous BID (Diuretic)    potassium chloride  20 mEq Oral BID    pantoprazole  40 mg Intravenous Daily    vancomycin  125 mg Oral q12h    heparin  5,000 Units Subcutaneous Q8H BROOKLYNN    sacubitril-valsartan  1 tablet Oral BID    metoprolol succinate  25 mg Oral Daily Beta Blocker    [Held by provider] midodrine  5 mg Oral TID    sodium hypochlorite   Topical BID    amiodarone  200 mg Oral Daily    folic acid  800 mcg Oral Daily    gabapentin  300 mg Oral TID       Assessment & Plan:   ASSESSMENT / PLAN:     Sepsis, acute, present on admission.  Hypotension not present.  Lactic acid level is elevated.  Source of infection unknwon.  End organ damage in form of ams.  Tachycardia present.  Tachypnea present.  Hypoxia present.  Fevers/hypothermia present.  Leukocytosis present.  -CT A/P with infectious/inflammatory colitis ?ischemic, loculated R pleural effusion   -Antibiotics: Continue on IV vanc and OVP, monitor right pleural effusion   -Blood cultures: ngtd  -ID consult appreciated  -Critical care consult appreciated      Failure to thrive  - hospice consulted  - on dextrose drip     Infected sacral decubitus ulcer.  Present on admission.  Status postdebridement.  Positive blood culture with Staph epidermidis, probably contaminant.  -Meropenem, stopped 9/3  -ID and general surgery consults appreciated  -Wound care  -Frequent repositioning  -bedbound      Acute metabolic encephalopathy.  Waxing and waning.    - Heat CT unremarkable    -Neurology consult appreciated  -Hold Keppra which may be contributing     GEORGE/cardiorenal.  Due to infection, volume status.  Her baseline is around 1.2.  -BMP daily   -Monitor urine output  - creatinine improving  -Renal consult     Severe Nonischmeic CM and Severe Pulm HTN  - status post right thoracentesis 7/12/2024 with negative cytology and culture, likely 2/2 CHF and CKD      None anion gap metabolic acidosis.  Now improved  -Nephrology consult  -Repeat BMP daily      Tachypnea.  Resolved     Remote history of seizures.  Has been off Keppra for at least 1 year.  Restarted during this admission initially for concerns of subclinical seizures.  EEG was negative.  Patient now has intermittent somnolence and confusion which may be related to prolonged hospitalization but there may be some contribution from Keppra.  -Hold Keppra  -Monitor for seizure     Other problems  Sepsis, present on admission, now resolved  Hyponatremia, resolved  UTI  Nonischemic cardiomyopathy  Chronic systolic congestive heart failure  Paroxysmal atrial fibrillation  CKD stage III  Thrombocytosis     DVT Mechanical Prophylaxis:   SCDs,  Heparin       Dispo: pending inpatient hospice eval         MDM: High   I personally spent time on chart/note review, review of labs/imaging, discussion with patient, physical exam, discussion with staff, consultants, coordinating care, writing progress note, and discussion of plan of care.    Supplementary Documentation:

## 2024-09-09 NOTE — PROGRESS NOTES
[facility]  part of [facility]    Progress Note    Margaret Silverio Patient Status:  Inpatient    3/14/1945 MRN [id number]   Location [facility]5W Attending Raiza Alvarez MD   Hosp Day # 28 PCP Johnathon Rodriguez, DO       Subjective:   Margaret Silverio is a(n) 79 year old female who I am seeing for   GEORGE/cardiorenal    Remains less responsive  Not eating anything    Objective:   /71 (BP Location: Right arm)   Pulse 60   Temp 97.6 °F (36.4 °C) (Axillary)   Resp 16   Wt 155 lb (70.3 kg)   SpO2 100%   BMI 26.61 kg/m²      Intake/Output Summary (Last 24 hours) at 2024 1341  Last data filed at 2024 1330  Gross per 24 hour   Intake 2070 ml   Output 750 ml   Net 1320 ml     Wt Readings from Last 1 Encounters:   24 155 lb (70.3 kg)       Exam  Vital signs: Blood pressure 147/71, pulse 60, temperature 97.6 °F (36.4 °C), temperature source Axillary, resp. rate 16, weight 155 lb (70.3 kg), SpO2 100%.    General: No acute distress.  Frail, lethargic.  HEENT: Moist mucous membranes. EOMI. PERRLA  Neck:  No JVD.   Respiratory: Clear to auscultation bilaterally.    Cardiovascular: S1, S2.  Regular rate and rhythm.   Abdomen: Soft, nontender, nondistended.    Neurologic: No focal neurological deficits.  Musculoskeletal: Edema  Access:    Results:     Recent Labs   Lab 24  0507 24  1116 24  0448 24  0450 24  0559   RBC 2.48* 2.76* 2.70* 2.72* 2.54*   HGB 7.8* 8.4* 8.4* 8.1* 7.9*   HCT 24.3* 26.7* 26.2* 26.9* 25.6*   MCV 98.0 96.7 97.0 98.9 100.8*   MCH 31.5 30.4 31.1 29.8 31.1   MCHC 32.1 31.5 32.1 30.1* 30.9*   RDW 21.5* 22.5* 23.0* 24.3* 24.7*   NEPRELIM 26.29* 23.31*  --  12.97*  --    WBC 35.7* 28.7* 20.7* 16.0* 14.7*   .0 210.0 193.0 175.0 160.0         Recent Labs   Lab 24  0448 24  0450 24  0559   GLU 84 78 100*   BUN 37* 33* 33*   CREATSERUM 1.47* 1.28* 1.34*   CA 8.0* 8.1* 8.9    143 143   K 3.6  4.3 4.2   * 114* 113*   CO2 21.0 22.0 22.0          No results found.    Assessment and Plan:     Impression:    GEORGE/cardiorenal.  Creatinine improving  CHFrEF 40-45%/  Fluid overloaded on Bumex  Hypoglycemia/failure to thrive, on dextrose drip  Anemia, IV iron  Sepsis/infected sacral decub.  Broad-spectrum antibiotic      Plan:    Continue Bumex, 3 times daily  Continue dextrose drip to avoid hypoglycemia  Noted plan for hospice      Thank you very much for allowing me to participate in the care of your patient.  If you have any questions, please do not hesitate to contact me.     Keerthi Naqvi MD

## 2024-09-09 NOTE — SLP NOTE
ADULT SWALLOWING EVALUATION    ASSESSMENT    ASSESSMENT/OVERALL IMPRESSION:  PPE REQUIRED. THIS SLP WORE GLOVES. HANDS SANITIZED/WASHED UPON ENTRANCE/EXIT.     SLP BSSE orders received and acknowledged. A swallow evaluation warranted as pt presents with new onset of difficulty swallowing.  The pt was admitted with diagnosis of pressure injury of skin of sacral region.  Chart reviewed and collaborated with RNMac.  Swallowing evaluation approved and RN reports pt with fluctuating alertness status, but is currently in an alert state.  Per RN the pt and family will be meeting for a hospice care meeting. Per MD noted, \"Patient is a 79-year-old  female who presented to the emergency department bedbound secondary to severe debilitating rheumatoid arthritis, underlying nonischemic cardiomyopathy. Was hospitalized recently and discharged after a heart failure episode and treated with diuretics and dobutamine. She had right and left heart catheterization. Also, she had pleurocentesis, 1 L removed. Fluid did not show evidence of infection. She was restarted on methotrexate and prednisone for her severe rheumatoid arthritis. Today, came back with painful decubitus ulcer, which the patient said started developing while she was in the hospital last time. CBC showed white blood cell count of 1.3, hemoglobin 7.4, and platelet count 103. Urinalysis showed evidence of urinary tract infection. Chemistry showed GFR of 18, which is at baseline; BUN and creatinine of 59 and 2.69. CT scan of the abdomen showed bladder mural wall thickening suggestive of cystitis, small to moderate right and moderate left loculated pleural effusion, chronic pancreatitis. Patient will be admitted to the hospital for further management. Started on IV Rocephin in the emergency room.\"  Pt seen at bedside and agreeable to participate in BSSE.  Pt denies any pain.  Pt is alert O x 2, afebrile with clear, weak vocal quality, tolerating 5 L HFNC  with oxygen saturation at 94% prior to the start of po trials. No family was present at the time of testing.  Pt with hx of dysphagia at [facility], 2/5/24, with BSSE recommending regular solids and thin liquids.     Pt positioned upright to 90 degrees in bed with head leaning towards the left side.  The pt was unable to move the head to a midline position. Oral [facility] examination completed.  Face symmetrical at rest with significant reduction in ROM/strength with labial. Lingual, and buccal movements.  Decreased rate of oral motor movements.  Assessed pt with po trials of puree, soft solids, mildly thick liquids, and thin liquids via open Teaspoon/cup. Pt with reduced oral acceptance with decreased mouth opening and difficulty rounding lips to take bolus off the spoon.  Anterior spillage of bolus with thin liquids. Oral phase of swallow appears delayed with reduced bolus control and propulsion. Pt with poor bite, incomplete/prolonged mastication of soft solids, and delayed A-P transit with solid bolus.  Mild oral stasis was cleared with a cued liquid wash.  Pharyngeal phase of the swallow appears delayed with reduced hyolaryngeal elevation/excursion.   Oxygen status reduced to 86% with thin liquid trials. Overt clinical signs of aspiration on thin liquids by teaspoon and mildly thick liquids via open cup with throat clearing and oxygen desaturation. No overt clinical signs of aspiration observed with any consistency of puree or mildly thick liquids via teaspoon (no coughing, no throat clearing, and vocal quality remains dry).  Pt denies any globus sensation post the swallow.  The pt was left at bedside resting in bed with call light in reach.    At this time, pt presents with moderate oral dysphagia and probable pharyngeal dysfunction. Pt at risk to maintain nutritional status secondary to fluctuations in alertness and reduced endurance/strength.  Hospice evaluation pending.  Recommend pleasure feedings with a puree diet  and teaspoon amounts of mildly thick liquids/no straw with strict adherence to safe swallowing compensatory strategies of 1:1 feeding when the pt is alert. Pt reports preferred learning style of education via verbal discussion.  Results and recommendations reviewed with pt and RN. Provided education via verbal discussion and demonstration. The pt was able to acknowledged understanding of the education but was not able to verbalize/demonstrate precautions .  SLP collaborated with RN for diet orders. Diet recommendations and swallowing precautions/strategies posted on white board at bedside.   FCM SCORE: 3/7     PLAN: Will follow up x 1-2x to ensure safety with diet and educate pt on compensatory strategies/swallow precautions. Video swallow study to be completed if CXR declines, increase in clinical signs of aspiration, and/or MD desires.            RECOMMENDATIONS   Diet Recommendations - Solids: Puree  Diet Recommendations - Liquids: Nectar thick liquids/ Mildly thick (by teaspoon only)                        Compensatory Strategies Recommended: No straws;Liquids via spoon;Slow rate;Alternate consistencies (1:1 feed only when alert;small bites (1/2 teaspoon amounts))  Aspiration Precautions: Slow rate;No straw;Upright position  Medication Administration Recommendations: Crushed in puree  Treatment Plan/Recommendations: Aspiration precautions    HISTORY   MEDICAL HISTORY  Reason for Referral: R/O aspiration    Problem List  Principal Problem:    Pressure injury of skin of sacral region, unspecified injury stage  Active Problems:    GEORGE (acute kidney injury) (HCC)    Sepsis (HCC)    Pancytopenia (HCC)    Cardiomyopathy, unspecified (HCC)    Urinary tract infection without hematuria, site unspecified    Anemia, unspecified type    Encephalopathy    Goals of care, counseling/discussion    Palliative care encounter    Pain      Past Medical History  Past Medical History:    Acute kidney insufficiency    Anemia     Arrhythmia    Back problem    CHF (congestive heart failure) (HCC)    CKD (chronic kidney disease) stage 3, GFR 30-59 ml/min (HCC)    Deep vein thrombosis (HCC)    on B.T for only a month, possibly Magen    Essential hypertension    High blood pressure    History of blood transfusion    Rheumatoid arthritis (HCC)    Seizure disorder (HCC)    Pt denied at screening call 6/28/24          Diet Prior to Admission: Regular;Thin liquids  Precautions: Aspiration    Patient/Family Goals: not stated    SWALLOWING HISTORY  Current Diet Consistency: NPO  Dysphagia History: Pt with hx of dysphagia at [facility], 2/5/24, with BSSE recommending regular solids and thin liquids.   Imaging Results:   CXR   CONCLUSION: There are findings compatible with given history of CHF.  Slight increase in size of bilateral pleural effusions, larger on the left.  Bibasilar pulmonary opacities likely representing secondary compressive atelectasis.            Dictated by (CST): Maxx Enriquez MD on 9/07/2024 at 9:42 AM       Finalized by (CST): Maxx Enriquez MD on 9/07/2024 at 9:43 AM       SUBJECTIVE   Pt pt was cooperative and alert during testing.    OBJECTIVE   ORAL MOTOR EXAMINATION  Dentition: Functional  Symmetry: Within Functional Limits  Strength:  (overall reduced)     Range of Motion:  (Overall reduced)  Rate of Motion: Reduced    Voice Quality: Weak  Respiratory Status: Supplemental O2;Nasal cannula  Consistencies Trialed: Thin liquids;Nectar thick liquids;Soft solid;Puree  Method of Presentation: Staff/Clinician assistance;Spoon;Cup;Single sips  Patient Positioning: Leaning to left    Oral Phase of Swallow: Impaired  Bolus Retrieval: Impaired  Bilabial Seal: Impaired  Bolus Formation: Impaired  Bolus Propulsion: Impaired  Mastication: Impaired  Retention: Impaired    Pharyngeal Phase of Swallow: Impaired  Laryngeal Elevation Timing: Appears impaired  Laryngeal Elevation Strength: Appears impaired  Laryngeal Elevation Coordination: Appears  intact  (Please note: Silent aspiration cannot be evaluated clinically. Videofluoroscopic Swallow Study is required to rule-out silent aspiration.)    Esophageal Phase of Swallow: No complaints consistent with possible esophageal involvement                GOALS  Goal #1 The patient will tolerate puree consistency and teaspoon amounts of mildly thick liquids without overt signs or symptoms of aspiration with 100 % accuracy over 1-2 session(s).  In Progress   Goal #2 The patient/family/caregiver will demonstrate understanding and implementation of aspiration precautions and swallow strategies independently over 1-2 session(s).    In Progress   Goal #3 The patient will utilize compensatory strategies as outlined by  BSSE (clinical evaluation) including Slow rate, Small bites, Multiple swallows, Alternate liquids/solids, No straws, Upright 90 degrees, Liquids via tsp amount only, 1/2 tsp amounts, Eliminate distractions, Feed patient with max assistance 100 % of the time across 2 sessions.  In Progress     FOLLOW UP  Treatment Plan/Recommendations: Aspiration precautions  Number of Visits to Meet Established Goals: 2  Follow Up Needed (Documentation Required): Yes  SLP Follow-up Date: 09/10/24    Thank you for your referral.   If you have any questions, please contact     Rachel Lemus MS/CCC-SLP  Speech Language Pathologist  [facility]  EXT. [phone]

## 2024-09-09 NOTE — HOSPICE RN NOTE
Hospice RN stopped by patient's room to speak with Barbi. Barbi stated she is not ready to make a hospice decision at this time. Barbi stated she has [facility] phone number and will call us when ready. Please call if our services are needed sooner. We will follow peripherally. Thank you.    Angy Ventura RN, BSN  Transitional Nurse Liaison  [facility]  [phone] (After-hours)

## 2024-09-09 NOTE — PLAN OF CARE
Fall precautions in place, safety precautions in place. Bed in lowest setting, locked with bed alarm on. Patient educated and encouraged to use call light as needed overnight. Frequent rounding and repositioning performed overnight by nursing staff. Pain medication given as requested/ordered overnight.       Problem: Patient Centered Care  Goal: Patient preferences are identified and integrated in the patient's plan of care  Description: Interventions:  - What would you like us to know as we care for you? From home with her    - Provide timely, complete, and accurate information to patient/family  - Incorporate patient and family knowledge, values, beliefs, and cultural backgrounds into the planning and delivery of care  - Encourage patient/family to participate in care and decision-making at the level they choose  - Honor patient and family perspectives and choices  Outcome: Progressing     Problem: Patient/Family Goals  Goal: Patient/Family Long Term Goal  Description: Patient's Long Term Goal: To go home    Interventions:  -Monitor vitals  -Monitor labs  -Follow MD orders  -Administer as needed medications per order  -Diagnostic scans/interventions as recommended   - See additional Care Plan goals for specific interventions  Outcome: Progressing  Goal: Patient/Family Short Term Goal  Description: Patient's Short Term Goal: To feel better    Interventions:   -Assess for pain  -Administer prn medication as ordered  -Decrease external stimuli  -Reposition as needed  -Frequent rounding  - See additional Care Plan goals for specific interventions  Outcome: Progressing     Problem: PAIN - ADULT  Goal: Verbalizes/displays adequate comfort level or patient's stated pain goal  Description: INTERVENTIONS:  - Encourage pt to monitor pain and request assistance  - Assess pain using appropriate pain scale  - Administer analgesics based on type and severity of pain and evaluate response  - Implement non-pharmacological  measures as appropriate and evaluate response  - Consider cultural and social influences on pain and pain management  - Manage/alleviate anxiety  - Utilize distraction and/or relaxation techniques  - Monitor for opioid side effects  - Notify MD/LIP if interventions unsuccessful or patient reports new pain  - Anticipate increased pain with activity and pre-medicate as appropriate  Outcome: Not Progressing     Problem: RISK FOR INFECTION - ADULT  Goal: Absence of fever/infection during anticipated neutropenic period  Description: INTERVENTIONS  - Monitor WBC  - Administer growth factors as ordered  - Implement neutropenic guidelines  Outcome: Progressing     Problem: SAFETY ADULT - FALL  Goal: Free from fall injury  Description: INTERVENTIONS:  - Assess pt frequently for physical needs  - Identify cognitive and physical deficits and behaviors that affect risk of falls.  - [place] fall precautions as indicated by assessment.  - Educate pt/family on patient safety including physical limitations  - Instruct pt to call for assistance with activity based on assessment  - Modify environment to reduce risk of injury  - Provide assistive devices as appropriate  - Consider OT/PT consult to assist with strengthening/mobility  - Encourage toileting schedule  Outcome: Progressing     Problem: SKIN/TISSUE INTEGRITY - ADULT  Goal: Skin integrity remains intact  Description: INTERVENTIONS  - Assess and document risk factors for pressure ulcer development  - Assess and document skin integrity  - Monitor for areas of redness and/or skin breakdown  - Initiate interventions, skin care algorithm/standards of care as needed  Outcome: Progressing  Goal: Incision(s), wounds(s) or drain site(s) healing without S/S of infection  Description: INTERVENTIONS:  - Assess and document risk factors for pressure ulcer development  - Assess and document skin integrity  - Assess and document dressing/incision, wound bed, drain sites and surrounding  tissue  - Implement wound care per orders  - Initiate isolation precautions as appropriate  - Initiate Pressure Ulcer prevention bundle as indicated  Outcome: Progressing  Goal: Oral mucous membranes remain intact  Description: INTERVENTIONS  - Assess oral mucosa and hygiene practices  - Implement preventative oral hygiene regimen  - Implement oral medicated treatments as ordered  Outcome: Progressing     Problem: RESPIRATORY - ADULT  Goal: Achieves optimal ventilation and oxygenation  Description: INTERVENTIONS:  - Assess for changes in respiratory status  - Assess for changes in mentation and behavior  - Position to facilitate oxygenation and minimize respiratory effort  - Oxygen supplementation based on oxygen saturation or ABGs  - Provide Smoking Cessation handout, if applicable  - Encourage broncho-pulmonary hygiene including cough, deep breathe, Incentive Spirometry  - Assess the need for suctioning and perform as needed  - Assess and instruct to report SOB or any respiratory difficulty  - Respiratory Therapy support as indicated  - Manage/alleviate anxiety  - Monitor for signs/symptoms of CO2 retention  Outcome: Progressing     Problem: CARDIOVASCULAR - ADULT  Goal: Maintains optimal cardiac output and hemodynamic stability  Description: INTERVENTIONS:  - Monitor vital signs, rhythm, and trends  - Monitor for bleeding, hypotension and signs of decreased cardiac output  - Evaluate effectiveness of vasoactive medications to optimize hemodynamic stability  - Monitor arterial and/or venous puncture sites for bleeding and/or hematoma  - Assess quality of pulses, skin color and temperature  - Assess for signs of decreased coronary artery perfusion - ex. Angina  - Evaluate fluid balance, assess for edema, trend weights  Outcome: Progressing  Goal: Absence of cardiac arrhythmias or at baseline  Description: INTERVENTIONS:  - Continuous cardiac monitoring, monitor vital signs, obtain 12 lead EKG if indicated  -  Evaluate effectiveness of antiarrhythmic and heart rate control medications as ordered  - Initiate emergency measures for life threatening arrhythmias  - Monitor electrolytes and administer replacement therapy as ordered  Outcome: Progressing     Problem: METABOLIC/FLUID AND ELECTROLYTES - ADULT  Goal: Glucose maintained within prescribed range  Description: INTERVENTIONS:  - Monitor Blood Glucose as ordered  - Assess for signs and symptoms of hyperglycemia and hypoglycemia  - Administer ordered medications to maintain glucose within target range  - Assess barriers to adequate nutritional intake and initiate nutrition consult as needed  - Instruct patient on self management of diabetes  Outcome: Progressing  Goal: Electrolytes maintained within normal limits  Description: INTERVENTIONS:  - Monitor labs and rhythm and assess patient for signs and symptoms of electrolyte imbalances  - Administer electrolyte replacement as ordered  - Monitor response to electrolyte replacements, including rhythm and repeat lab results as appropriate  - Fluid restriction as ordered  - Instruct patient on fluid and nutrition restrictions as appropriate  Outcome: Progressing  Goal: Hemodynamic stability and optimal renal function maintained  Description: INTERVENTIONS:  - Monitor labs and assess for signs and symptoms of volume excess or deficit  - Monitor intake, output and patient weight  - Monitor urine specific gravity, serum osmolarity and serum sodium as indicated or ordered  - Monitor response to interventions for patient's volume status, including labs, urine output, blood pressure (other measures as available)  - Encourage oral intake as appropriate  - Instruct patient on fluid and nutrition restrictions as appropriate  Outcome: Progressing     Problem: Delirium  Goal: Minimize duration of delirium  Description: Interventions:  - Encourage use of hearing aids, eye glasses  - Promote highest level of mobility daily  - Provide  frequent reorientation  - Promote wakefulness i.e. lights on, blinds open  - Promote sleep, encourage patient's normal rest cycle i.e. lights off, TV off, minimize noise and interruptions  - Encourage family to assist in orientation and promotion of home routines  Outcome: Progressing     Problem: Diabetes/Glucose Control  Goal: Glucose maintained within prescribed range  Description: INTERVENTIONS:  - Monitor Blood Glucose as ordered  - Assess for signs and symptoms of hyperglycemia and hypoglycemia  - Administer ordered medications to maintain glucose within target range  - Assess barriers to adequate nutritional intake and initiate nutrition consult as needed  - Instruct patient on self management of diabetes  Outcome: Progressing

## 2024-09-09 NOTE — PROGRESS NOTES
[facility] ID PROGRESS NOTE    Margaret Silverio Patient Status:  Inpatient    3/14/1945 MRN [id number]   Location [facility] 2W/SW Attending Veto Zhang MD   Hosp Day # 28 PCP Johnathon Rodriguez, DO     SUBJECTIVE  ROS limited. Lethargic in bed. Poor appetite.    ASSESSMENT:     Antibiotics: Vancomycin, OVP (IV ceftriaxone -, meropenem     # Acute sepsis with lactic acidosis with hypoxia   -CT A/P with infectious/inflammatory colitis ?ischemic, loculated R pleural effusion  # GEROGE  # Staph epidermidis bacteremia in 1/2 sets on 24 - likely contaminant               -Has bioprosthetic MVR and L chest PPM   -TTE without vegetation  # Leukocytosis - suspect reactive; r/o bacteremia; repeat cx NGTD  -Also got 1 dose of solumedrol  # GEORGE on CKD  # Anemia s/p transfusion      # Sacral wound stage 2 s/p OR debridement 24 - cx E.coli, anaerobes - currently no signs of infection     # Asymptomatic bacteruria - E. coli  # Intermittent AMS    # RA, bedbound, previously on MTX and prednisone               - received prednisone at last discharge; currently off related to limited PO     PLAN:  -  Will stop vancomycin at this time and monitor off abx.  -  Hospice discussions ongoing.  -  Follow fever curve, wbc.   -  Reviewed labs, micro, imaging reports.  -  Case d/w patient, RN.     History of Present Illness:  79-year-old female with a history of severe nonischemic cardiomyopathy with multiple admissions for heart failure, A-fib, CKD, severe RA with multiple joints involved was recently discharged about 1 month prior.  During that admission had a large left pleural effusion on dobutamine, Bumex, thoracentesis, left and right heart cath showing normal coronaries but severe volume overload.  Also had GEORGE and leukopenia.  Now admitted on  with painful decubitus ulcer.  Patient is bedbound with severe RA on methotrexate and prednisone.  Afebrile, 2 L nasal  cannula.  Seen by general surgery and taken the OR on 8/13 status post excision of cyst and sharp debridement of the decubitus ulcer.  Cultures with E. coli and anaerobes.  IV ceftriaxone for sacral wound and UTI completed 8/21.  Now with fever of 100.2 on 8/22 with increased WBC.  Blood culture sent with staph not aureus in 1 out of 2 sets.  Chest x-ray with no new focal opacity with persistent edema noted.  Goals of care discussion ongoing related to poor appetite, pain control.     OBJECTIVE  /71 (BP Location: Right arm)   Pulse 60   Temp 97.6 °F (36.4 °C) (Axillary)   Resp 16   Wt 155 lb (70.3 kg)   SpO2 100%   BMI 26.61 kg/m²     Gen:   Awake, in bed  HEENT:  EOMI, neck supple  CV/lungs:  Regular rate and rhythm, lungs CTAB  Abdom:  Soft, mild periumbilical TTP  Skin/extrem:  No rashes, no c/c/e, hand contractures noted  :   [place]ure[place]+  Lines:  PIV+    Laboratory Data: Reviewed     Microbiology: Reviewed     Radiology: Reviewed    [facility]  [phone]

## 2024-09-09 NOTE — PLAN OF CARE
Problem: Patient Centered Care  Goal: Patient preferences are identified and integrated in the patient's plan of care  Description: Interventions:  - What would you like us to know as we care for you? Pt states that she does not want pain medication that will sedate her or make her feel drowsy.  - Provide timely, complete, and accurate information to patient/family  - Incorporate patient and family knowledge, values, beliefs, and cultural backgrounds into the planning and delivery of care  - Encourage patient/family to participate in care and decision-making at the level they choose  - Honor patient and family perspectives and choices  Outcome: Progressing     Problem: Patient/Family Goals  Goal: Patient/Family Long Term Goal  Description: Patient's Long Term Goal: Total healing of the sacral wound.    Interventions:  - Dressing changes daily.  - Q2 turns.  - See additional Care Plan goals for specific interventions  Outcome: Progressing  Goal: Patient/Family Short Term Goal  Description: Patient's Short Term Goal: Manage the Pts pain and discomfort from the sacral wound, Improve pts ability to eat and swallow.    Interventions:   - administer pain medication as prescribed.  -Pureed diet, attempting to eat as much pureed diet at meals as possible  - See additional Care Plan goals for specific interventions  Outcome: Progressing     Problem: PAIN - ADULT  Goal: Verbalizes/displays adequate comfort level or patient's stated pain goal  Description: INTERVENTIONS:  - Encourage pt to monitor pain and request assistance  - Assess pain using appropriate pain scale  - Administer analgesics based on type and severity of pain and evaluate response  - Implement non-pharmacological measures as appropriate and evaluate response  - Consider cultural and social influences on pain and pain management  - Manage/alleviate anxiety  - Utilize distraction and/or relaxation techniques  - Monitor for opioid side effects  - Notify MD/LIP  if interventions unsuccessful or patient reports new pain  - Anticipate increased pain with activity and pre-medicate as appropriate  Outcome: Progressing     Problem: RISK FOR INFECTION - ADULT  Goal: Absence of fever/infection during anticipated neutropenic period  Description: INTERVENTIONS  - Monitor WBC  - Administer growth factors as ordered  - Implement neutropenic guidelines  Outcome: Progressing     Problem: SAFETY ADULT - FALL  Goal: Free from fall injury  Description: INTERVENTIONS:  - Assess pt frequently for physical needs  - Identify cognitive and physical deficits and behaviors that affect risk of falls.  - [place] fall precautions as indicated by assessment.  - Educate pt/family on patient safety including physical limitations  - Instruct pt to call for assistance with activity based on assessment  - Modify environment to reduce risk of injury  - Provide assistive devices as appropriate  - Consider OT/PT consult to assist with strengthening/mobility  - Encourage toileting schedule  Outcome: Progressing     Problem: SKIN/TISSUE INTEGRITY - ADULT  Goal: Skin integrity remains intact  Description: INTERVENTIONS  - Assess and document risk factors for pressure ulcer development  - Assess and document skin integrity  - Monitor for areas of redness and/or skin breakdown  - Initiate interventions, skin care algorithm/standards of care as needed  Outcome: Progressing  Goal: Incision(s), wounds(s) or drain site(s) healing without S/S of infection  Description: INTERVENTIONS:  - Assess and document risk factors for pressure ulcer development  - Assess and document skin integrity  - Assess and document dressing/incision, wound bed, drain sites and surrounding tissue  - Implement wound care per orders  - Initiate isolation precautions as appropriate  - Initiate Pressure Ulcer prevention bundle as indicated  Outcome: Progressing  Goal: Oral mucous membranes remain intact  Description: INTERVENTIONS  - Assess  oral mucosa and hygiene practices  - Implement preventative oral hygiene regimen  - Implement oral medicated treatments as ordered  Outcome: Progressing     Problem: Delirium  Goal: Minimize duration of delirium  Description: Interventions:  - Encourage use of hearing aids, eye glasses  - Promote highest level of mobility daily  - Provide frequent reorientation  - Promote wakefulness i.e. lights on, blinds open  - Promote sleep, encourage patient's normal rest cycle i.e. lights off, TV off, minimize noise and interruptions  - Encourage family to assist in orientation and promotion of home routines  Outcome: Progressing     Problem: Diabetes/Glucose Control  Goal: Glucose maintained within prescribed range  Description: INTERVENTIONS:  - Monitor Blood Glucose as ordered  - Assess for signs and symptoms of hyperglycemia and hypoglycemia  - Administer ordered medications to maintain glucose within target range  - Assess barriers to adequate nutritional intake and initiate nutrition consult as needed  - Instruct patient on self management of diabetes  Outcome: Progressing     Problem: RESPIRATORY - ADULT  Goal: Achieves optimal ventilation and oxygenation  Description: INTERVENTIONS:  - Assess for changes in respiratory status  - Assess for changes in mentation and behavior  - Position to facilitate oxygenation and minimize respiratory effort  - Oxygen supplementation based on oxygen saturation or ABGs  - Provide Smoking Cessation handout, if applicable  - Encourage broncho-pulmonary hygiene including cough, deep breathe, Incentive Spirometry  - Assess the need for suctioning and perform as needed  - Assess and instruct to report SOB or any respiratory difficulty  - Respiratory Therapy support as indicated  - Manage/alleviate anxiety  - Monitor for signs/symptoms of CO2 retention  Outcome: Progressing     Problem: CARDIOVASCULAR - ADULT  Goal: Maintains optimal cardiac output and hemodynamic stability  Description:  INTERVENTIONS:  - Monitor vital signs, rhythm, and trends  - Monitor for bleeding, hypotension and signs of decreased cardiac output  - Evaluate effectiveness of vasoactive medications to optimize hemodynamic stability  - Monitor arterial and/or venous puncture sites for bleeding and/or hematoma  - Assess quality of pulses, skin color and temperature  - Assess for signs of decreased coronary artery perfusion - ex. Angina  - Evaluate fluid balance, assess for edema, trend weights  Outcome: Progressing  Goal: Absence of cardiac arrhythmias or at baseline  Description: INTERVENTIONS:  - Continuous cardiac monitoring, monitor vital signs, obtain 12 lead EKG if indicated  - Evaluate effectiveness of antiarrhythmic and heart rate control medications as ordered  - Initiate emergency measures for life threatening arrhythmias  - Monitor electrolytes and administer replacement therapy as ordered  Outcome: Progressing     Problem: METABOLIC/FLUID AND ELECTROLYTES - ADULT  Goal: Glucose maintained within prescribed range  Description: INTERVENTIONS:  - Monitor Blood Glucose as ordered  - Assess for signs and symptoms of hyperglycemia and hypoglycemia  - Administer ordered medications to maintain glucose within target range  - Assess barriers to adequate nutritional intake and initiate nutrition consult as needed  - Instruct patient on self management of diabetes  Outcome: Progressing  Goal: Electrolytes maintained within normal limits  Description: INTERVENTIONS:  - Monitor labs and rhythm and assess patient for signs and symptoms of electrolyte imbalances  - Administer electrolyte replacement as ordered  - Monitor response to electrolyte replacements, including rhythm and repeat lab results as appropriate  - Fluid restriction as ordered  - Instruct patient on fluid and nutrition restrictions as appropriate  Outcome: Progressing  Goal: Hemodynamic stability and optimal renal function maintained  Description: INTERVENTIONS:  -  Monitor labs and assess for signs and symptoms of volume excess or deficit  - Monitor intake, output and patient weight  - Monitor urine specific gravity, serum osmolarity and serum sodium as indicated or ordered  - Monitor response to interventions for patient's volume status, including labs, urine output, blood pressure (other measures as available)  - Encourage oral intake as appropriate  - Instruct patient on fluid and nutrition restrictions as appropriate  Outcome: Progressing

## 2024-09-10 ENCOUNTER — HOSPITAL ENCOUNTER (INPATIENT)
Facility: HOSPITAL | Age: 79
End: 2024-09-10
Attending: HOSPITALIST
Payer: OTHER MISCELLANEOUS

## 2024-09-10 VITALS
DIASTOLIC BLOOD PRESSURE: 82 MMHG | OXYGEN SATURATION: 100 % | RESPIRATION RATE: 18 BRPM | TEMPERATURE: 98 F | WEIGHT: 155 LBS | SYSTOLIC BLOOD PRESSURE: 140 MMHG | HEART RATE: 60 BPM | BODY MASS INDEX: 27 KG/M2

## 2024-09-10 PROBLEM — I50.9 CONGESTIVE HEART FAILURE (CHF) (HCC): Status: ACTIVE | Noted: 2024-01-01

## 2024-09-10 PROBLEM — I50.9 CONGESTIVE HEART FAILURE (CHF) (HCC): Status: ACTIVE | Noted: 2024-09-10

## 2024-09-10 LAB
ALBUMIN SERPL-MCNC: 2.7 G/DL (ref 3.2–4.8)
ANION GAP SERPL CALC-SCNC: 9 MMOL/L (ref 0–18)
BASOPHILS # BLD AUTO: 0.12 X10(3) UL (ref 0–0.2)
BASOPHILS NFR BLD AUTO: 1.3 %
BUN BLD-MCNC: 30 MG/DL (ref 9–23)
BUN/CREAT SERPL: 21.6 (ref 10–20)
CALCIUM BLD-MCNC: 8.9 MG/DL (ref 8.7–10.4)
CHLORIDE SERPL-SCNC: 109 MMOL/L (ref 98–112)
CO2 SERPL-SCNC: 21 MMOL/L (ref 21–32)
CREAT BLD-MCNC: 1.39 MG/DL
DEPRECATED RDW RBC AUTO: 84.7 FL (ref 35.1–46.3)
EGFRCR SERPLBLD CKD-EPI 2021: 39 ML/MIN/1.73M2 (ref 60–?)
EOSINOPHIL # BLD AUTO: 0.28 X10(3) UL (ref 0–0.7)
EOSINOPHIL NFR BLD AUTO: 2.9 %
ERYTHROCYTE [DISTWIDTH] IN BLOOD BY AUTOMATED COUNT: 24.1 % (ref 11–15)
GLUCOSE BLD-MCNC: 95 MG/DL (ref 70–99)
GLUCOSE BLDC GLUCOMTR-MCNC: 100 MG/DL (ref 70–99)
GLUCOSE BLDC GLUCOMTR-MCNC: 104 MG/DL (ref 70–99)
GLUCOSE BLDC GLUCOMTR-MCNC: 95 MG/DL (ref 70–99)
HCT VFR BLD AUTO: 26.1 %
HGB BLD-MCNC: 8.2 G/DL
IMM GRANULOCYTES # BLD AUTO: 0.16 X10(3) UL (ref 0–1)
IMM GRANULOCYTES NFR BLD: 1.7 %
LYMPHOCYTES # BLD AUTO: 1.01 X10(3) UL (ref 1–4)
LYMPHOCYTES NFR BLD AUTO: 10.6 %
MCH RBC QN AUTO: 31.4 PG (ref 26–34)
MCHC RBC AUTO-ENTMCNC: 31.4 G/DL (ref 31–37)
MCV RBC AUTO: 100 FL
MONOCYTES # BLD AUTO: 1 X10(3) UL (ref 0.1–1)
MONOCYTES NFR BLD AUTO: 10.5 %
NEUTROPHILS # BLD AUTO: 6.94 X10 (3) UL (ref 1.5–7.7)
NEUTROPHILS # BLD AUTO: 6.94 X10(3) UL (ref 1.5–7.7)
NEUTROPHILS NFR BLD AUTO: 73 %
OSMOLALITY SERPL CALC.SUM OF ELEC: 294 MOSM/KG (ref 275–295)
PHOSPHATE SERPL-MCNC: 4.8 MG/DL (ref 2.4–5.1)
PLATELET # BLD AUTO: 154 10(3)UL (ref 150–450)
POTASSIUM SERPL-SCNC: 3.9 MMOL/L (ref 3.5–5.1)
RBC # BLD AUTO: 2.61 X10(6)UL
SODIUM SERPL-SCNC: 139 MMOL/L (ref 136–145)
WBC # BLD AUTO: 9.5 X10(3) UL (ref 4–11)

## 2024-09-10 PROCEDURE — 99232 SBSQ HOSP IP/OBS MODERATE 35: CPT | Performed by: PHYSICIAN ASSISTANT

## 2024-09-10 PROCEDURE — 99239 HOSP IP/OBS DSCHRG MGMT >30: CPT | Performed by: HOSPITALIST

## 2024-09-10 NOTE — PROGRESS NOTES
[facility]  part of [facility]    Progress Note    Margaret Silverio Patient Status:  Inpatient    3/14/1945 MRN [id number]   Location [facility]5W Attending Otoniel Vasquez MD   Hosp Day # 29 PCP Johnathon Rodriguez,      Subjective:   Seen and examined while resting in bed. Answering simple questions only. Denies shortness of breath and cough. Denies pain.     Objective:   Blood pressure 157/74, pulse 60, temperature 97.5 °F (36.4 °C), temperature source Axillary, resp. rate 18, weight 155 lb (70.3 kg), SpO2 100%.  Physical Exam  Vitals and nursing note reviewed.   Constitutional:       General: She is awake. She is not in acute distress.     Appearance: She is ill-appearing.      Interventions: Nasal cannula in place.   HENT:      Head: Normocephalic and atraumatic.   Cardiovascular:      Rate and Rhythm: Normal rate and regular rhythm.      Heart sounds: Murmur heard.   Pulmonary:      Effort: No respiratory distress.      Breath sounds: Rales present. No wheezing or rhonchi.   Abdominal:      General: Bowel sounds are normal. There is distension.      Palpations: Abdomen is soft.      Tenderness: There is no abdominal tenderness.   Musculoskeletal:         General: Deformity (Contracted) present.      Right lower leg: Edema present.      Left lower leg: Edema present.   Neurological:      Mental Status: She is alert. Mental status is at baseline.      Motor: Weakness present.      Comments: A&Ox1 to person  Bedbound       Results:   Lab Results   Component Value Date    WBC 9.5 09/10/2024    HGB 8.2 (L) 09/10/2024    HCT 26.1 (L) 09/10/2024    .0 09/10/2024    CREATSERUM 1.39 (H) 09/10/2024    BUN 30 (H) 09/10/2024     09/10/2024    K 3.9 09/10/2024     09/10/2024    CO2 21.0 09/10/2024    GLU 95 09/10/2024    CA 8.9 09/10/2024    ALB 2.7 (L) 09/10/2024    ALKPHO 198 (H) 2024    BILT 0.7 2024    TP 5.5 (L) 2024     (H) 2024     ALT 79 (H) 09/07/2024    PTT 39.2 (H) 09/04/2024    INR 1.72 (H) 09/04/2024    T4F 1.1 09/03/2024    TSH 6.866 (H) 09/03/2024    LIP 32 01/13/2024    ESRML 40 (H) 07/17/2024    CRP 9.00 (H) 07/17/2024    MG 2.1 09/08/2024    PHOS 4.8 09/10/2024    TROPHS 14 07/18/2024    B12 >2,000 (H) 07/01/2024       Assessment & Plan:   Severe sepsis - resolved  Possible intra-abdominal source vs pulmonary source  Sacral decubitus ulcer s/p OR debridement 8/13/24  WBC normalized  Completed course of antibiotics  ID following  Plan:  -Completed antibiotics    Sacral decubitus ulcer  S/p OR debridement 8/13/24; culture E coli  Plan:  -Wound care    Severe nonischemic cardiomyopathy, wide open TR, severe pulmonary hypertension  Echo LVEF 30-35%, dyskinesis of anteroseptal and anterior walls, biatrial dilation, wide open TR, PASP 48 mm Hg  Fluid overload +6.2 L  Plan:  -IV diuresis  -Entresto, metoprolol    Acute on chronic hypoxemic respiratory failure  CXR with CHF and chronic basilar pleural effusions  S/p L thoracentesis 7/16/24 - negative culture and cytology, likely due to CHF and CKD  No plans for further thoracentesis  On 3 L O2 with sats 100%  Plan:  -O2 and wean as tolerated    Acute on chronic kidney disease  Plan:  -As per renal    Severe rheumatoid arthritis with significant joint deformities  Bedbound with chronic sacral decubitus ulcer  Plan:  -Supportive care    Failure to thrive  Dysphagia on modified diet  Minimal PO intake  Plan:  -IV dextrose  -May need Dobhoff for tube feedings contingent on goals of care    DVT prophylaxis: Subcutaneous heparin    Code status: DNAR/Select    Goals of care: Prognosis guarded. Hospice meeting yesterday; family not ready.     D/w hospitalist Dr. Vasquez.    Jigar Lawrence PA-C  Pulmonary Medicine  9/10/2024

## 2024-09-10 NOTE — PLAN OF CARE
Fall precautions in place, safety precautions in place. Bed in lowest setting, locked with bed alarm on. Patient educated and encouraged to use call light as needed overnight. Frequent rounding and repositioning performed overnight by nursing staff.       Problem: Patient Centered Care  Goal: Patient preferences are identified and integrated in the patient's plan of care  Description: Interventions:  - What would you like us to know as we care for you? From home  - Provide timely, complete, and accurate information to patient/family  - Incorporate patient and family knowledge, values, beliefs, and cultural backgrounds into the planning and delivery of care  - Encourage patient/family to participate in care and decision-making at the level they choose  - Honor patient and family perspectives and choices  Outcome: Progressing     Problem: Patient/Family Goals  Goal: Patient/Family Long Term Goal  Description: Patient's Long Term Goal: to go home     Interventions:  -Monitor vitals  -Monitor labs  -Follow MD orders  -Administer as needed medications per order  -Diagnostic scans/interventions as recommended    - See additional Care Plan goals for specific interventions  Outcome: Progressing  Goal: Patient/Family Short Term Goal  Description: Patient's Short Term Goal: to feel better    Interventions:   -Assess for pain  -Administer prn medication as ordered  -Decrease external stimuli  -Reposition as needed  -Frequent rounding    - See additional Care Plan goals for specific interventions  Outcome: Progressing     Problem: PAIN - ADULT  Goal: Verbalizes/displays adequate comfort level or patient's stated pain goal  Description: INTERVENTIONS:  - Encourage pt to monitor pain and request assistance  - Assess pain using appropriate pain scale  - Administer analgesics based on type and severity of pain and evaluate response  - Implement non-pharmacological measures as appropriate and evaluate response  - Consider cultural  and social influences on pain and pain management  - Manage/alleviate anxiety  - Utilize distraction and/or relaxation techniques  - Monitor for opioid side effects  - Notify MD/LIP if interventions unsuccessful or patient reports new pain  - Anticipate increased pain with activity and pre-medicate as appropriate  Outcome: Progressing     Problem: RISK FOR INFECTION - ADULT  Goal: Absence of fever/infection during anticipated neutropenic period  Description: INTERVENTIONS  - Monitor WBC  - Administer growth factors as ordered  - Implement neutropenic guidelines  Outcome: Progressing     Problem: SAFETY ADULT - FALL  Goal: Free from fall injury  Description: INTERVENTIONS:  - Assess pt frequently for physical needs  - Identify cognitive and physical deficits and behaviors that affect risk of falls.  - Byers fall precautions as indicated by assessment.  - Educate pt/family on patient safety including physical limitations  - Instruct pt to call for assistance with activity based on assessment  - Modify environment to reduce risk of injury  - Provide assistive devices as appropriate  - Consider OT/PT consult to assist with strengthening/mobility  - Encourage toileting schedule  Outcome: Progressing     Problem: SKIN/TISSUE INTEGRITY - ADULT  Goal: Skin integrity remains intact  Description: INTERVENTIONS  - Assess and document risk factors for pressure ulcer development  - Assess and document skin integrity  - Monitor for areas of redness and/or skin breakdown  - Initiate interventions, skin care algorithm/standards of care as needed  Outcome: Progressing  Goal: Incision(s), wounds(s) or drain site(s) healing without S/S of infection  Description: INTERVENTIONS:  - Assess and document risk factors for pressure ulcer development  - Assess and document skin integrity  - Assess and document dressing/incision, wound bed, drain sites and surrounding tissue  - Implement wound care per orders  - Initiate isolation  precautions as appropriate  - Initiate Pressure Ulcer prevention bundle as indicated  Outcome: Progressing  Goal: Oral mucous membranes remain intact  Description: INTERVENTIONS  - Assess oral mucosa and hygiene practices  - Implement preventative oral hygiene regimen  - Implement oral medicated treatments as ordered  Outcome: Progressing     Problem: RESPIRATORY - ADULT  Goal: Achieves optimal ventilation and oxygenation  Description: INTERVENTIONS:  - Assess for changes in respiratory status  - Assess for changes in mentation and behavior  - Position to facilitate oxygenation and minimize respiratory effort  - Oxygen supplementation based on oxygen saturation or ABGs  - Provide Smoking Cessation handout, if applicable  - Encourage broncho-pulmonary hygiene including cough, deep breathe, Incentive Spirometry  - Assess the need for suctioning and perform as needed  - Assess and instruct to report SOB or any respiratory difficulty  - Respiratory Therapy support as indicated  - Manage/alleviate anxiety  - Monitor for signs/symptoms of CO2 retention  Outcome: Progressing     Problem: CARDIOVASCULAR - ADULT  Goal: Maintains optimal cardiac output and hemodynamic stability  Description: INTERVENTIONS:  - Monitor vital signs, rhythm, and trends  - Monitor for bleeding, hypotension and signs of decreased cardiac output  - Evaluate effectiveness of vasoactive medications to optimize hemodynamic stability  - Monitor arterial and/or venous puncture sites for bleeding and/or hematoma  - Assess quality of pulses, skin color and temperature  - Assess for signs of decreased coronary artery perfusion - ex. Angina  - Evaluate fluid balance, assess for edema, trend weights  Outcome: Progressing  Goal: Absence of cardiac arrhythmias or at baseline  Description: INTERVENTIONS:  - Continuous cardiac monitoring, monitor vital signs, obtain 12 lead EKG if indicated  - Evaluate effectiveness of antiarrhythmic and heart rate control  medications as ordered  - Initiate emergency measures for life threatening arrhythmias  - Monitor electrolytes and administer replacement therapy as ordered  Outcome: Progressing     Problem: METABOLIC/FLUID AND ELECTROLYTES - ADULT  Goal: Glucose maintained within prescribed range  Description: INTERVENTIONS:  - Monitor Blood Glucose as ordered  - Assess for signs and symptoms of hyperglycemia and hypoglycemia  - Administer ordered medications to maintain glucose within target range  - Assess barriers to adequate nutritional intake and initiate nutrition consult as needed  - Instruct patient on self management of diabetes  Outcome: Progressing  Goal: Electrolytes maintained within normal limits  Description: INTERVENTIONS:  - Monitor labs and rhythm and assess patient for signs and symptoms of electrolyte imbalances  - Administer electrolyte replacement as ordered  - Monitor response to electrolyte replacements, including rhythm and repeat lab results as appropriate  - Fluid restriction as ordered  - Instruct patient on fluid and nutrition restrictions as appropriate  Outcome: Progressing  Goal: Hemodynamic stability and optimal renal function maintained  Description: INTERVENTIONS:  - Monitor labs and assess for signs and symptoms of volume excess or deficit  - Monitor intake, output and patient weight  - Monitor urine specific gravity, serum osmolarity and serum sodium as indicated or ordered  - Monitor response to interventions for patient's volume status, including labs, urine output, blood pressure (other measures as available)  - Encourage oral intake as appropriate  - Instruct patient on fluid and nutrition restrictions as appropriate  Outcome: Progressing     Problem: Delirium  Goal: Minimize duration of delirium  Description: Interventions:  - Encourage use of hearing aids, eye glasses  - Promote highest level of mobility daily  - Provide frequent reorientation  - Promote wakefulness i.e. lights on, blinds  open  - Promote sleep, encourage patient's normal rest cycle i.e. lights off, TV off, minimize noise and interruptions  - Encourage family to assist in orientation and promotion of home routines  Outcome: Progressing     Problem: Diabetes/Glucose Control  Goal: Glucose maintained within prescribed range  Description: INTERVENTIONS:  - Monitor Blood Glucose as ordered  - Assess for signs and symptoms of hyperglycemia and hypoglycemia  - Administer ordered medications to maintain glucose within target range  - Assess barriers to adequate nutritional intake and initiate nutrition consult as needed  - Instruct patient on self management of diabetes  Outcome: Progressing

## 2024-09-10 NOTE — SLP NOTE
SPEECH DAILY NOTE - INPATIENT    ASSESSMENT & PLAN   ASSESSMENT  PPE REQUIRED. THIS THERAPIST WORE GLOVES, DROPLET MASK, AND GOGGLES FOR DURATION OF EVALUATION. HANDS WASHED UPON ENTRANCE/EXIT.    SLP f/u for ongoing dysphagia tx/meal assessment per recommendations of puree/mildly thick liquids via tsp (pleasure feeds) per bSE. RN reports pt tolerates diet and medication well with no overt clinical s/s aspiration, however, limited intake due to TOSHIA.     Pt positioned upright in bed and leaning to left. Pt afebrile, tolerating 3L/Min O2NC with oxygen status 100 prior to the start of oral trials. SLP reviewed aspiration precautions and safe swallowing compensatory strategies with the patient. Safe swallow guidelines remain written on the white board in purple. Diet recommendations remain on the whiteboard in the room. Patient's RN v/u. Provided 1:1 feed assistance, pt tolerates 1/2 tsp of puree and mildly thick liquids via tsp with no overt clinical signs/symptoms of aspiration. Oxygen status remained >98 t/o the entire session. Oral/buccal cavities clear of residue following limited trials. Pt with declining TOSHIA and not appropriate for further PO.     PLAN: SLP to f/u x1 meal assessment, monitor CXR, and VFSS if clinically warranted.     Diet Recommendations - Solids: Puree  Diet Recommendations - Liquids: Nectar thick liquids/ Mildly thick (by teaspoon only)    Compensatory Strategies Recommended: No straws;Liquids via spoon;Slow rate;Alternate consistencies (1:1 feed only when alert;small bites (1/2 teaspoon amounts))  Aspiration Precautions: Slow rate;No straw;Upright position  Medication Administration Recommendations: Crushed in puree    Patient Experiencing Pain: No  Treatment Plan  Treatment Plan/Recommendations: Aspiration precautions    Interdisciplinary Communication: Discussed with RN  Recommendations posted at bedside            GOALS  Goal #1 The patient will tolerate puree consistency and teaspoon  amounts of mildly thick liquids without overt signs or symptoms of aspiration with 100 % accuracy over 1-2 session(s).  In Progress   Goal #2 The patient/family/caregiver will demonstrate understanding and implementation of aspiration precautions and swallow strategies independently over 1-2 session(s).    In Progress   Goal #3 The patient will utilize compensatory strategies as outlined by  BSSE (clinical evaluation) including Slow rate, Small bites, Multiple swallows, Alternate liquids/solids, No straws, Upright 90 degrees, Liquids via tsp amount only, 1/2 tsp amounts, Eliminate distractions, Feed patient with max assistance 100 % of the time across 2 sessions.  In Progress     FOLLOW UP  Follow Up Needed (Documentation Required): Yes  SLP Follow-up Date: 09/11/24  Number of Visits to Meet Established Goals: 1    Session: 1 following BSSE    If you have any questions, please contact BROOK Jones M.S. CCC-SLP  Speech Language Pathologist  Phone Number Ext. [phone]

## 2024-09-10 NOTE — DIETARY NOTE
ADULT NUTRITION REASSESSMENT    Pt is at high nutrition risk.  Pt meets severe malnutrition criteria.      CRITERIA FOR MALNUTRITION DIAGNOSIS:  Criteria for severe malnutrition diagnosis: acute illness/injury related to wt loss greater than 7.5% in 3 months, energy intake less than 50% for greater than 5 days, body fat moderate depletion, and muscle mass moderate depletion.    RECOMMENDATIONS TO MD:  CPM.  Declined feeding tube this admit.    See Nutrition Intervention for oral nutritional supplement (ONS) specifics     ADMITTING DIAGNOSIS:  Urinary tract infection without hematuria, site unspecified [N39.0]  Anemia, unspecified type [D64.9]  Pressure injury of skin of sacral region, unspecified injury stage [L89.159]  PERTINENT PAST MEDICAL HISTORY:   Past Medical History:    Acute kidney insufficiency    Anemia    Arrhythmia    Back problem    CHF (congestive heart failure) (HCC)    CKD (chronic kidney disease) stage 3, GFR 30-59 ml/min (HCC)    Deep vein thrombosis (HCC)    on B.T for only a month, possibly Magen    Essential hypertension    High blood pressure    History of blood transfusion    Rheumatoid arthritis (HCC)    Seizure disorder (HCC)    Pt denied at screening call 6/28/24     PATIENT STATUS: Initial 08/14/24: Pt admit for urinary tract infection without hematuria, anemia and pressure injury or skin of sacral region. PMH sig for HTN, CHF, CKD and others noted above. Pt assessed due to screening at risk for decreased appetite, unintentional weight loss and pressure injury (PI) - unstageable and maceration to right buttock and stage 2 & shear to left buttock. Pt known to nutrition services from previous admissions, last seen 6/19/24 and provided with HF diet education.  Chart reviewed, pt admitted with c/o sacral wound x 3 weeks and dark/loose stools. Pt with recent admission (7/15-7/22) for acute on chronic congestive heart failure - treated with diuretics and dobutamine. General surgery consulted for  debridement.  POD#1 s/p sacral debridement. Discussion with RN, poor appetite. Intakes reviewed, 25-40% x 2 meals since admit (poor). Pt visited, resting with eyes closed but answering majority of questions. Pt reports poor appetite/intake but unable to report timeframe. Pt reports not eating. Pt notes used to drink Ensure but stopped due to going right through causing pain/difficulties prior to surgery. Pt reports weight is not good, usual body weight (UBW) 142# but unable to determine last time weighed this. Current weight 120#. EMR weight review, last known weight 112# 2 oz on 8/1/24. Per EMR weight review, pt noted weighing 129# 10 oz on 7/1/24 - 9.6# or 7.4% weight loss x 1 month (significant), 129# 13 oz on 5/16/24 - 9.8# or 7.6% weight loss x 3 months (significant), 149# 14 oz on 2/3/24 - 29.9# or 19.9% weight loss x 6 months (significant) and 141# 14 oz on 8/21/23 - 21.9# or 15.4% weight loss x 1 year (non-significant). Nutrition focused physical exam (NFPE) completed, see below for details. Encouraged small frequent meals with emphasis on high calorie and high protein and ONS to help maximize nutrition. +ONS  per discussion.  8/19 UPDATE: pt slow to respond but did awake when I directed a question to her, but then fell back asleep. Daughter reports that her dad can get the pt to drink some of the ensure enlive. Otherwise pt eating very little. Pt with high K+ today--not diet related due to limited intake, but did adjust diet to liberalize cardiac diet and added low K+.    08/23/24 UPDATE: GOC discussions on-going. Per MD documentation, family report the patient would not want a feeding tube. Pt was more alert/awake this AM however was given Norco due to increased pain - now is sleepy/drowsy and inappropriate for interview. Refusing lunch. Only taking a few sips of ONS daily. POD #10 s/p excision of cyst, sharp excisional debridement of buttock decubitus ulcer.   08/27/24 UPDATE:     Noted hospice meeting  tomorrow. Po intake remains poor overall with a few fair/good meals over past week. Pt having pain impacting appetite but palliative follows and pain meds adjusted to address. No feeding tube/family. Supplements provided but intake poor.   09/02/24 UPDATE: Family declined hospice. Also declining feeding tube. Awaiting insurance authorization for SNF placement. Pt continues to have intermittent somnolence and confusion. Pt lying in bed with DTR at bedside at time of visit. DTR with meal from home for pt. Per DTR, family bringing in ~1 meal daily with limited acceptance. Pt's spouse usually visits in the evening. Pt typically consumes 1-1.5 Ensure Enlive daily with encouragement of family. Pt ordered 1 meal/day over the past 3 days per kitchen record. Pt typically eats better when family is present.   09/06/24 UPDATE:   Continued poor po intake but pt is accepting some nutrition supplements. Extra bottles of supplements in room- will hold on sending future at this time. Palliative care consulted. No feeding tubes per family and pt.     Update 09/10/24: RA completed per protocol. Chart reviewed, RRT called 9/7 r/t desaturation. On O2. Transferred out of CCU to PMU. Hospice consulted and discussion with family on-going. Hospice met with daughter yesterday - not ready to make decision at this time per hospice note. On D10W r/t hypoglycemia given prolonged poor oral intake. Discussion with RN, not eating, too lethargic to eat. Daughter here this morning, asking to meet again with residential. Residential called by RN. Intakes reviewed, 0-15% x 7 meals since last visit per flowsheet review. Code Status. DNAR/Select.     FOOD/NUTRITION RELATED HISTORY:  Appetite: Poor   Intake:  Intake remains minimal - 0-15% x 7 meals since last visit  Not taking in much po r/t lethargic per RN.   Intake Meeting Needs: No, even with oral nutrition supplements (ONS) ordered   Percent Meals Eaten (last 6 days)       Date/Time Percent Meals  Eaten (%)    09/04/24 2200 100 %     Percent Meals Eaten (%): 1 ensure shake at 09/04/24 2200    09/05/24 0800 40 %     Percent Meals Eaten (%): Apple sauce at 09/05/24 0800    09/06/24 1819 0 %    09/07/24 0754 0 %    09/07/24 1229 0 %    09/07/24 1700 0 %    09/07/24 2300 0 %    09/09/24 1330 10 %    09/09/24 1900 15 %          Food Allergies: No Known Food Allergies (NKFA)  Cultural/Ethnic/Druze Preferences: Not Obtained    GASTROINTESTINAL: +BM 9/4/24 - medium-soft and SLP evaluation noted - recommending pureed, mildly thick (nectar thick) liquids with 1 to 1 feeding assist when pt alert    MEDICATIONS: reviewed; Noted non-cardiac electrolyte replacement protocol ordered  D10W @ 42 ml/hr (provides ~ 343 calories from dextrose and 1008 ml volume)  IV Bumex BID noted   metoprolol tartrate  12.5 mg Oral 2x Daily(Beta Blocker)    acetaminophen  1,000 mg Intravenous Q8H    bumetanide  1 mg Intravenous BID (Diuretic)    potassium chloride  20 mEq Oral BID    pantoprazole  40 mg Intravenous Daily    vancomycin  125 mg Oral q12h    heparin  5,000 Units Subcutaneous Q8H BROOKLYNN    sacubitril-valsartan  1 tablet Oral BID    [Held by provider] midodrine  5 mg Oral TID    sodium hypochlorite   Topical BID    amiodarone  200 mg Oral Daily    folic acid  800 mcg Oral Daily    gabapentin  300 mg Oral TID      dextrose 42 mL/hr at 09/10/24 0009     LABS: reviewed; A1c 5.9% on 11/27/22  POC Glucose reviewed  Recent Labs     09/07/24  0450 09/08/24  0559 09/10/24  0612   GLU 78 100* 95   BUN 33* 33* 30*   CREATSERUM 1.28* 1.34* 1.39*   CA 8.1* 8.9 8.9   MG 1.7 2.1  --     143 139   K 4.3 4.2 3.9   * 113* 109   CO2 22.0 22.0 21.0   PHOS 3.8  --  4.8   OSMOCALC 302* 303* 294     NUTRITION RELATED PHYSICAL FINDINGS:  - Nutrition Focused Physical Exam (NFPE): moderate depletion body fat triceps region and moderate depletion muscle mass clavicle region unable to evaluate lower extremities at this time - largely bed-bound  but will get up and move to a chair for part of the day most days  - Fluid Accumulation: Non-pitting edema generalized, R hand, bilateral feet; +1 edema RUE and L hand; +2 LUE, bilateral lower extremities  see RN documentation for details  - Skin Integrity: Stage 3 sacrum and at risk  see RN documentation for details    ANTHROPOMETRICS:  HT:  162.6 cm (5' 4\")  WT: 70.3 kg (155 lb) - wt up 35# from admission - likely fluid gains.   Last Visit Wt: 149# 15 oz on 9/4/24  ADMISSION WT: 54.4 kg (120 lbs) - utilized for anthropometric assessment.   BMI: Body mass index is 20.58 kg/m² - utilizing admission weight  BMI CLASSIFICATION: <22 considered underweight for advanced age  IBW: 120 lbs        100% IBW compared to admission weight  Usual Body Wt: 142 lbs per pt but unable to determine timeframe      85% UBW compared to admission weight    WEIGHT HISTORY: Per EMR weight review, pt noted weighing 149# February 2024 and 141# August 2023  Patient Weight(s) for the past 336 hrs:   Weight   09/09/24 0222 70.3 kg (155 lb)   09/07/24 1700 68.9 kg (152 lb)   09/04/24 0800 68 kg (149 lb 14.6 oz)   09/03/24 2138 65.1 kg (143 lb 8.3 oz)   09/02/24 0900 68 kg (150 lb)   09/02/24 0602 68.9 kg (152 lb)   09/01/24 0657 66 kg (145 lb 9.6 oz)   08/31/24 0533 60.4 kg (133 lb 3.2 oz)     Wt Readings from Last 10 Encounters:   09/09/24 70.3 kg (155 lb)   08/01/24 50.8 kg (112 lb 1.6 oz)   07/22/24 52.3 kg (115 lb 3.2 oz)   07/09/24 56.6 kg (124 lb 12.8 oz)   06/28/24 61.7 kg (136 lb)   07/01/24 58.8 kg (129 lb 9.6 oz)   06/29/24 60.8 kg (134 lb)   06/26/24 63.5 kg (140 lb)   06/21/24 63.5 kg (140 lb)   06/13/24 62.1 kg (137 lb)     NUTRITION DIAGNOSIS/PROBLEM:   Severe Malnutrition related to Acute - Physiological causes resulting in anorexia or diminished intake as evidenced by wt loss greater than 7.5% in 3 months, energy intake less than 50% for greater than 5 days, body fat moderate depletion, and muscle mass moderate  depletion.    NUTRITION DIAGNOSIS PROGRESS:  No Improvement (continue)-- intake remains poor, GOC on-going    NUTRITION INTERVENTION:   NUTRITION PRESCRIPTION:   Estimated Nutrition needs: --dosing wt of 54.4 kg - wt taken on 8/12/2024  Calories: 2055-7165 calories/day (28-32 calories per kg Dosing wt)  Protein: 64-80 g protein/day (1.2-1.5 g protein/kg Dosing wt)    - Diet:       Procedures    Regular/General diet Sodium Restriction: 2 GM NA; Fluid Restriction: 1500 ml; Fluid Consistency: Nectar Thick / Mildly Thick Liquids; Texture Consistency: Pureed; Is Patient on Accuchecks? Yes; Misc Restriction: No Straw   Most liberal diet in view of poor nutrition intake.     - RD Malnutrition Care Plan: Adjusted diet to least restrictive to maximize intake, Encouraged increased PO intake, Encouraged small frequent meals with emphasis on high calorie/high protein, and Initiated ONS (oral nutritional supplements)  - Meals and snacks: Encouraged small frequent meals and Encouraged increased PO intake  - Medical Food Supplements: RD added Magic Cup (290 calories/ 9 g protein each) Daily Chocolate with dinner trays. Rational/use of oral supplements discussed.   - Vitamin and mineral supplements: folic acid/ MD  - Feeding assistance: meal set up and feed when alert  - Nutrition education: Discussed importance of adequate energy and protein intake   - Coordination of nutrition care: collaboration with other providers   - Discharge and transfer of nutrition care to new setting or provider: monitor plans - SNF placement     MONITOR AND EVALUATE/NUTRITION GOALS:  - Food and Nutrient Intake:      Monitor: adequacy of PO intake and adequacy of supplement intake  - Anthropometric Measurement:    Monitor weight  - Nutrition Goals:      allow wt loss due to fluid losses, PO and supplement greater than 75% of needs, labs within acceptable limits, euglycemia, support wound healing, monitor fluid status, improved GI status, and support  normal BM. Supportive nutrition care. [facility]      DIETITIAN FOLLOW UP: RD to follow    Fidelina Ambrose MS, HANS, RDN, LDN  Clinical Dietitian  P: [phone]

## 2024-09-10 NOTE — HOSPICE RN NOTE
[facility] RN visit for follow up on Hospice consult order. Met with Daughter ISAAC Landon and BRADEN Barney. Discussed information on the Hospice philosophy, Medicare benefit, levels of care, team approach and focus on comfort with questions and concerns addressed. The family would like to proceed with hospice care. DaughterBarbi signed consents for hospice care and DNR Comfort.     [facility] RN admitted the patient to general inpatient hospice per Dr. Otoniel Vasquez and Dr. Robert Erickson. Patient is eligible for general inpatient hospice care for symptom management of pain, dyspnea and complex wound care protocol requiring frequent nursing assessments and interventions including titration of IV medications.    Chart flipped. Comfort order set placed.     Nectar thick, pureed diet with pleasure feeding. Aspiration precautions in place.    PPS: 10%    POC discussed with Barbi and JEFFERY Rose. All in agreement. [facility] to follow daily to support the patient and family.    Jessie Garner RN, BSN  Transitional Nurse Liaison  [facility]  [phone] (After-hours)

## 2024-09-10 NOTE — CM/SW NOTE
LSW follow up with pt's dtr Barbi regarding hospice. Barbi is ready to proceed with hospice for pt. Barbi signed hospice consents as pt is weak and lethargic. Pt will be admitted GIP with [facility]. Pt's dtr has requested that the words \"comfort care\" be used when speaking to the patient and other family members. Pt's medical care should only be discussed with pt's dtr Barbi who is pt's medical POA. LSW will continue to follow up with pt and family to offer comfort and support. Pt's POC discussed with MD Vasquez, RN Rose and ALEXEY Chase.      Savita Hung, [facility]  [facility]  [id number]

## 2024-09-10 NOTE — DISCHARGE SUMMARY
[facility]  part of [facility]     DISCHARGE SUMMARY     Margaret Silverio Patient Status:  Inpatient    3/14/1945 MRN [id number]   Location [facility]5W Attending Otoniel Vasquez MD   Hosp Day # 29 PCP Johnathon Rodriguez DO     DATE OF ADMISSION: 2024  DATE OF DISCHARGE:  09/10/24  DISPOSITION: hospice  CONDITION ON DISCHARGE: good    DISCHARGE DIAGNOSES:  Sepsis  Failure to thrive  Infected sacral decubitus ulcer  Acute metabolic encephalopathy  GEORGE/cardiorenal  Severe Nonischmeic CM and Severe Pulm HTN  None anion gap metabolic acidosis  Tachypnea  Remote history of seizures  Sepsis, present on admission, now resolved  Hyponatremia, resolved  UTI  Nonischemic cardiomyopathy  Chronic systolic congestive heart failure  Paroxysmal atrial fibrillation  CKD stage III  Thrombocytosis    HISTORY OF PRESENT ILLNESS (COPIED FROM ADMISSION H&P)  Patient is a 79-year-old  female who presented to the emergency department bedbound secondary to severe debilitating rheumatoid arthritis, underlying nonischemic cardiomyopathy. Was hospitalized recently and discharged after a heart failure episode and treated with diuretics and dobutamine. She had right and left heart catheterization. Also, she had pleurocentesis, 1 L removed. Fluid did not show evidence of infection. She was restarted on methotrexate and prednisone for her severe rheumatoid arthritis. Today, came back with painful decubitus ulcer, which the patient said started developing while she was in the hospital last time. CBC showed white blood cell count of 1.3, hemoglobin 7.4, and platelet count 103. Urinalysis showed evidence of urinary tract infection. Chemistry showed GFR of 18, which is at baseline; BUN and creatinine of 59 and 2.69. CT scan of the abdomen showed bladder mural wall thickening suggestive of cystitis, small to moderate right and moderate left loculated pleural effusion, chronic pancreatitis.  Patient will be admitted to the hospital for further management. Started on IV Rocephin in the emergency room.     HOSPITAL COURSE:  This is a brief summary of a prolonged hospitalization.  Patient was admitted with sepsis presumably due to infected decubitus ulcer.  Ultimately underwent debridement in the OR.  Had a waxing and waning course especially with regards to mental status.  She again became septic and antibiotics were expanded ultimately patient continued to decline, not taking oral intake very well.  Seen by palliative care and after numerous discussions on end-of-life issues, goals of care family elected for inpatient hospice.    PHYSICAL EXAM:  Temp:  [97.5 °F (36.4 °C)-97.8 °F (36.6 °C)] 97.5 °F (36.4 °C)  Pulse:  [60] 60  Resp:  [15-18] 18  BP: (140-157)/(65-82) 140/82  SpO2:  [100 %] 100 %  General: eldelry female, ill appearing, somnolent  Respiratory: Clear to auscultation bilaterally. No wheezes. No rhonchi.  Cardiovascular: S1, S2. Regular rate and rhythm. No murmurs, rubs or gallops.   Abdomen: Soft, nontender, nondistended.  Positive bowel sounds. No rebound or guarding.  : rivero in place  Neurologic: No focal neurological deficits.   Musculoskeletal: Moves all extremities.  Extremities: Bilateral UE swollen     DISCHARGE MEDICATIONS     Discharge Medications        ASK your doctor about these medications        Instructions Prescription details   alendronate 70 MG Tabs  Commonly known as: Fosamax      Take 1 tablet (70 mg total) by mouth once a week.   Quantity: 12 tablet  Refills: 3     amiodarone 200 MG Tabs  Commonly known as: Pacerone      Take 1 tablet (200 mg total) by mouth daily.   Refills: 0     bumetanide 1 MG Tabs  Commonly known as: Bumex      Take 1 tablet (1 mg total) by mouth daily.   Quantity: 30 tablet  Refills: 2     carvedilol 6.25 MG Tabs  Commonly known as: Coreg      Take 0.5 tablets (3.125 mg total) by mouth 2 (two) times daily with meals.   Quantity: 30  tablet  Refills: 2     cyclobenzaprine 10 MG Tabs  Commonly known as: Flexeril      Take 1 tablet (10 mg total) by mouth nightly.   Stop taking on: September 24, 2024  Quantity: 30 tablet  Refills: 1     dapagliflozin 10 MG Tabs  Commonly known as: Farxiga      Take 1 tablet (10 mg total) by mouth daily.   Quantity: 30 tablet  Refills: 5     fentaNYL 12 MCG/HR Pt72  Commonly known as: Duragesic      Place 1 patch onto the skin every third day.   Quantity: 10 patch  Refills: 0     ferrous sulfate 325 (65 FE) MG Tbec      Take 1 tablet (325 mg total) by mouth daily with breakfast.   Refills: 0     folic acid 800 MCG Tabs  Commonly known as: FOLVITE      Take 1 tablet (800 mcg total) by mouth daily.   Quantity: 90 tablet  Refills: 0     gabapentin 300 MG Caps  Commonly known as: Neurontin  Ask about: Should I take this medication?      Take 1 capsule (300 mg total) by mouth 2 (two) times daily.   Stop taking on: August 1, 2024  Quantity: 60 capsule  Refills: 0     gabapentin 300 MG Caps  Commonly known as: Neurontin      Take 1 capsule (300 mg total) by mouth 3 (three) times daily.   Quantity: 90 capsule  Refills: 0     HYDROcodone-acetaminophen  MG Tabs  Commonly known as: Norco  Ask about: Should I take this medication?      Take 1 tablet by mouth every 8 (eight) hours as needed for Pain.   Stop taking on: August 24, 2024  Quantity: 60 tablet  Refills: 0     isosorbide dinitrate 20 MG Tabs  Commonly known as: Isordil      Take 1 tablet (20 mg total) by mouth TID (Nitrates).   Quantity: 90 tablet  Refills: 3     lidocaine-menthol 4-1 % Ptch      Place 1 patch onto the skin daily.   Quantity: 30 patch  Refills: 0     methotrexate 2.5 MG Tabs  Commonly known as: Rheumatrex      TAKE 6 TABLETS BY MOUTH 1 TIME A WEEK   Quantity: 77 tablet  Refills: 0     midodrine 5 MG Tabs  Commonly known as: ProAmatine      Take 1 tablet (5 mg total) by mouth 2 (two) times daily before meals.   Quantity: 60 tablet  Refills: 0      Naloxone HCl 4 MG/0.1ML Liqd      4 mg by Nasal route as needed. If patient remains unresponsive, repeat dose in other nostril 2-5 minutes after first dose.   Quantity: 1 kit  Refills: 0     pantoprazole 40 MG Tbec  Commonly known as: Protonix      TAKE 1 TABLET(40 MG) BY MOUTH TWICE DAILY BEFORE MEALS   Quantity: 180 tablet  Refills: 0     Polyethylene Glycol 3350 17 g Pack  Commonly known as: MIRALAX      Take 17 g by mouth daily as needed.   Refills: 0     predniSONE 20 MG Tabs  Commonly known as: Deltasone  Ask about: Should I take this medication?      Take 1 tablet (20 mg total) by mouth daily with breakfast for 5 days.   Stop taking on: July 28, 2024  Quantity: 5 tablet  Refills: 0     sacubitril-valsartan 49-51 MG Tabs  Commonly known as: Entresto      Take 1 tablet by mouth 2 (two) times daily.   Quantity: 60 tablet  Refills: 1     senna-docusate 8.6-50 MG Tabs  Commonly known as: Senokot-S      Take 2 tablets by mouth daily.   Quantity: 30 tablet  Refills: 0     Senna-Time 8.6 MG Tabs  Generic drug: sennosides      senna 8.6 mg tablet, [RxNorm: 669398]   Refills: 0     spironolactone 25 MG Tabs  Commonly known as: Aldactone       Refills: 0     Tylenol Extra Strength 500 MG Tabs  Generic drug: acetaminophen      Take 1 tablet (500 mg total) by mouth every 6 (six) hours as needed for Pain.   Refills: 0              CONSULTANTS  Consultants         Provider   Role Specialty     Linsey Liao MD      Consulting Physician PULMONARY DISEASES     Radha Ahn MD      Consulting Physician NEPHROLOGY     Gabriel Massey DO      Consulting Physician Critical Care     Augustin Parsons DO      Consulting Physician PULMONARY DISEASES     Geoffrey Villareal MD      Consulting Physician INFECTIOUS DISEASES     Jose Pérez MD      Consulting Physician NEUROLOGY     Twin Boles MD      Consulting Physician Interventional, Cardiology     Eben Mckeon MD      Consulting Physician SURGERY, GENERAL             FOLLOW UP:  No follow-up provider specified.  The above plan and follow-up instructions were reviewed with the patient and they verbalized understanding and agreement.  They understand to return to the emergency room for any concerning signs or symptoms.  Greater than 30 minutes spent on discharge.  -----------------------

## 2024-09-10 NOTE — SPIRITUAL CARE NOTE
Spiritual Care Visit Note    Patient Name: Margaret Silverio Date of Spiritual Care Visit: 09/10/24   : 3/14/1945 Primary Dx: <principal problem not specified>       Referred By: Referral From: Hospice;Nurse;        Visit Type/Summary:     received consult for grief support, referral for Hospice Chaplain Beatris Myers who will visit with patient on 09/10.     - Spiritual Care: Responded to a request via the on call phone Offered empathic listening and emotional support.  remains available as needed for follow up.    Spiritual Care support can be requested via an Epic consult. For urgent/immediate needs, please contact the On Call  at: [place]: ext [phone]    Chaplain Albertina.

## 2024-09-11 NOTE — PROGRESS NOTES
Patient transferred from fifth floor. Pain medication given. Dilaudid drip started. Family at bedside. Frequent rounding and repositioning performed. Receiving 3L of O2 per nasal cannula. Daughter Barbi updated with plan of care. Safety measures in place. Poor appetite. Patient is bed bound/max assist. Comfort measures in place.

## 2024-09-11 NOTE — H&P
[facility]  part of [facility]    History & Physical    Margaret Silverio Patient Status:  Inpatient    3/14/1945 MRN [id number]   Location [facility]5W Attending Michelle Buck MD   Hosp Day # 1 PCP Johnathon Rodriguez, DO     Date:  2024  Date of Admission:  9/10/2024    History provided by:patient and daughter  Chief Complaint:   No chief complaint on file.  Failure to thrive    HPI:   Margaret Silverio is a(n) 79 year old female  who initially presented to the emergency department on 24 bedbound secondary to severe debilitating rheumatoid arthritis, underlying nonischemic cardiomyopathy. She was hospitalized just recently and discharged after a heart failure episode and treated with diuretics and dobutamine. She had right and left heart catheterization. Also, she had pleurocentesis, 1 L removed. Fluid did not show evidence of infection. She was restarted on methotrexate and prednisone for her severe rheumatoid arthritis. This time she also c/o painful decubitus ulcer, which the patient said started developing while she was in the hospital last time. CBC showed white blood cell count of 1.3, hemoglobin 7.4, and platelet count 103. Urinalysis showed evidence of urinary tract infection. Chemistry showed GFR of 18, which is at baseline; BUN and creatinine of 59 and 2.69. CT scan of the abdomen showed bladder mural wall thickening suggestive of cystitis, small to moderate right and moderate left loculated pleural effusion, chronic pancreatitis.   She had a prolonged hospitalization (-9/10/2024). Treated for sepsis presumably due to infected decubitus ulcer. Ultimately underwent debridement in the OR. Had a waxing and waning course especially with regards to mental status. She again became septic and antibiotics were expanded, however ultimately patient continued to decline, not taking oral intake very well. Seen by palliative care and after numerous discussions  on end-of-life issues, and goals of care family/POA daughter elected for inpatient hospice with focus on comfort.             History     Past Medical History:    Acute kidney insufficiency    Anemia    Arrhythmia    Back problem    CHF (congestive heart failure) (HCC)    CKD (chronic kidney disease) stage 3, GFR 30-59 ml/min (HCC)    Deep vein thrombosis (HCC)    on B.T for only a month, possibly Magen    Essential hypertension    High blood pressure    History of blood transfusion    Rheumatoid arthritis (HCC)    Seizure disorder (HCC)    Pt denied at screening call 6/28/24     Past Surgical History:   Procedure Laterality Date    Cabg      Cardiac pacemaker placement      Colonoscopy N/A 01/17/2024    Dr. Rios    Colonoscopy N/A 01/17/2024    Procedure: COLONOSCOPY;  Surgeon: Zoraida Rios MD;  Location: [facility] ENDOSCOPY    Egd N/A 01/17/2024    Dr. Rios    Fracture surgery      Other surgical history  2017    Heart Surgery     Other surgical history  2020    Bilateral shoulder replacement      No family history on file.  Social History:  Social History     Socioeconomic History    Marital status:    Tobacco Use    Smoking status: Former     Current packs/day: 0.00     Types: Cigarettes     Quit date: 2014     Years since quitting: 10.7    Smokeless tobacco: Never   Vaping Use    Vaping status: Never Used   Substance and Sexual Activity    Alcohol use: Never    Drug use: Never     Social Determinants of Health     Financial Resource Strain: Low Risk  (6/4/2024)    Financial Resource Strain     Difficulty of Paying Living Expenses: Not very hard     Med Affordability: No   Food Insecurity: Unknown (8/12/2024)    Food Insecurity     Food Insecurity: Patient declined   Transportation Needs: Unknown (8/12/2024)    Transportation Needs     Lack of Transportation: Patient declined   Housing Stability: Unknown (8/12/2024)    Housing Stability     Housing Instability: Patient declined     Allergies/Medications:    Allergies:   Allergies   Allergen Reactions    Lisinopril Coughing     Medications Prior to Admission   Medication Sig    bumetanide 1 MG Oral Tab Take 1 tablet (1 mg total) by mouth daily.    carvedilol 6.25 MG Oral Tab Take 0.5 tablets (3.125 mg total) by mouth 2 (two) times daily with meals.    sennosides (SENNA-TIME) 8.6 MG Oral Tab senna 8.6 mg tablet, [RxNorm: 182762]    cyclobenzaprine 10 MG Oral Tab Take 1 tablet (10 mg total) by mouth nightly.    [] HYDROcodone-acetaminophen (NORCO)  MG Oral Tab Take 1 tablet by mouth every 8 (eight) hours as needed for Pain.    fentaNYL 12 MCG/HR Transdermal Patch 72 Hr Place 1 patch onto the skin every third day. (Patient taking differently: Place 1 patch onto the skin every third day. Placed 8/10 left arm)    midodrine 5 MG Oral Tab Take 1 tablet (5 mg total) by mouth 2 (two) times daily before meals.    polyethylene glycol, PEG 3350, 17 g Oral Powd Pack Take 17 g by mouth daily as needed.    [] predniSONE 20 MG Oral Tab Take 1 tablet (20 mg total) by mouth daily with breakfast for 5 days.    senna-docusate 8.6-50 MG Oral Tab Take 2 tablets by mouth daily.    Naloxone HCl 4 MG/0.1ML Nasal Liquid 4 mg by Nasal route as needed. If patient remains unresponsive, repeat dose in other nostril 2-5 minutes after first dose.    acetaminophen (TYLENOL EXTRA STRENGTH) 500 MG Oral Tab Take 1 tablet (500 mg total) by mouth every 6 (six) hours as needed for Pain.    isosorbide dinitrate 20 MG Oral Tab Take 1 tablet (20 mg total) by mouth TID (Nitrates).    gabapentin 300 MG Oral Cap Take 1 capsule (300 mg total) by mouth 3 (three) times daily.    dapagliflozin (FARXIGA) 10 MG Oral Tab Take 1 tablet (10 mg total) by mouth daily.    spironolactone 25 MG Oral Tab     alendronate 70 MG Oral Tab Take 1 tablet (70 mg total) by mouth once a week. (Patient taking differently: Take 1 tablet (70 mg total) by mouth once a week. Every Tuesday)    methotrexate 2.5 MG Oral Tab  TAKE 6 TABLETS BY MOUTH 1 TIME A WEEK    [] gabapentin 300 MG Oral Cap Take 1 capsule (300 mg total) by mouth 2 (two) times daily. (Patient not taking: Reported on 2024)    lidocaine-menthol 4-1 % External Patch Place 1 patch onto the skin daily.    PANTOPRAZOLE 40 MG Oral Tab EC TAKE 1 TABLET(40 MG) BY MOUTH TWICE DAILY BEFORE MEALS    SACUBITRIL-VALSARTAN 49-51 MG Oral Tab Take 1 tablet by mouth 2 (two) times daily.    folic acid 800 MCG Oral Tab Take 1 tablet (800 mcg total) by mouth daily.    amiodarone 200 MG Oral Tab Take 1 tablet (200 mg total) by mouth daily.    ferrous sulfate 325 (65 FE) MG Oral Tab EC Take 1 tablet (325 mg total) by mouth daily with breakfast.       Review of Systems:   Pertinent items are noted in HPI.       Physical Exam:   Vital Signs:  Blood pressure 132/73, pulse 60, temperature 98 °F (36.7 °C), temperature source Axillary, resp. rate 18, SpO2 98%.     General:  looks ill, now resting in bed, dtr/POA at bedside  Diffuse skin problem:  warm  Eye:   closed  HENT:  Normocephalic, oral mucosa is dry.  Head:  Normocephalic, atraumatic.  Neck:  Supple, non-tender  Respiratory:  Lungs are course and diminished to auscultation  Cardiovascular:  Normal rate, regular rhythm, +laud heart murmur, +diffuse bilateral pitting edema.  Gastrointestinal:  Soft, non-tender, non-distended, normal bowel sounds   Neurologic:  resting calmly after dose of iv dilaudid       Results:     Lab Results   Component Value Date    WBC 9.5 09/10/2024    HGB 8.2 (L) 09/10/2024    HCT 26.1 (L) 09/10/2024    .0 09/10/2024    CREATSERUM 1.39 (H) 09/10/2024    BUN 30 (H) 09/10/2024     09/10/2024    K 3.9 09/10/2024     09/10/2024    CO2 21.0 09/10/2024    GLU 95 09/10/2024    CA 8.9 09/10/2024    ALB 2.7 (L) 09/10/2024    ALKPHO 198 (H) 2024    BILT 0.7 2024    TP 5.5 (L) 2024     (H) 2024    ALT 79 (H) 2024    PTT 39.2 (H) 2024    INR 1.72 (H)  09/04/2024    T4F 1.1 09/03/2024    TSH 6.866 (H) 09/03/2024    LIP 32 01/13/2024    ESRML 40 (H) 07/17/2024    CRP 9.00 (H) 07/17/2024    MG 2.1 09/08/2024    PHOS 4.8 09/10/2024    B12 >2,000 (H) 07/01/2024       No results found.        Assessment/Plan:     Congestive heart failure (CHF) (HCC)    Sepsis  Failure to thrive  Infected sacral decubitus ulcer  Acute metabolic encephalopathy  GEORGE/cardiorenal  Severe Nonischmeic CM and Severe Pulm HTN  None anion gap metabolic acidosis  Tachypnea  Remote history of seizures  Sepsis, present on admission, now resolved  Hyponatremia, resolved  UTI  Nonischemic cardiomyopathy  Chronic systolic congestive heart failure  Paroxysmal atrial fibrillation  CKD stage III  Thrombocytosis    Dispo: continue comfort care, transfer to inpatient hospice 4th floor once bed available       Chart reviewed, including current admission vitals, notes, labs and imaging  Pertinent past medical records reviewed  medications adjusted as outlined above  Coordinate care with care team/consultants  Discussed with  family at bedside available results of tests, management plan as outlined above, and the need for hospitalization  D/w RN     [facility]       CEE CHIU MD  9/11/2024    **Certification      PHYSICIAN Certification of Need for Inpatient Hospitalization - Initial Certification    Patient will require inpatient services that will reasonably be expected to span two midnight's based on the clinical documentation in H+P.   Based on patients current state of illness, I anticipate that, after discharge, patient will require TBD.

## 2024-09-11 NOTE — HOSPICE RN NOTE
[facility] Inpatient Nursing Rounds:     Patient seen at bedside with family present. Patient would open eyes and moan, and frown. Patient has had Dilaudid IVP x 3 today, daughter agreeable to begin the Dilaudid gtt at 0.2mg/hr.    Patient has an ICD, [facility] [employer] contacted by hospital RN, and a rep will be coming out. Charisse GIL informed to obtain a magnet if patient becomes imminent.    POC discussed with daughter and Charisse GIL. All are in agreement. [facility] will continue to support the patient and family.     Elizabeth Kearney, RN  [facility] RN Liaison  [phone] (After-hours)

## 2024-09-11 NOTE — PAYOR COMM NOTE
--------------  DISCHARGE REVIEW    Payor: [employer]  Subscriber #:  [id number]  Authorization Number: [id number]    Admit date: 24  Admit time:   2:41 PM  Discharge Date: 9/10/2024  1:56 PM     Admitting Physician: Azul Sahni MD  Attending Physician:  No att. providers found  Primary Care Physician: Johnathon Rodriguez DO          Discharge Summary Notes        Discharge Summary signed by Otoniel Vasquez MD at 9/10/2024  1:53 PM       Author: Otoniel Vasquez MD Specialty: HOSPITALIST Author Type: Physician    Filed: 9/10/2024  1:53 PM Date of Service: 9/10/2024  1:51 PM Status: Signed    : Otoniel Vasquez MD (Physician)           [facility]  part of Located within [facility]     DISCHARGE SUMMARY     Margaret iSlverio Patient Status:  Inpatient    3/14/1945 MRN [id number]   Location [facility]5W Attending Otoniel Vasquez MD   Hosp Day # 29 PCP Johnathon Rodriguez DO     DATE OF ADMISSION: 2024  DATE OF DISCHARGE:  09/10/24  DISPOSITION: hospice  CONDITION ON DISCHARGE: good    DISCHARGE DIAGNOSES:  Sepsis  Failure to thrive  Infected sacral decubitus ulcer  Acute metabolic encephalopathy  GEORGE/cardiorenal  Severe Nonischmeic CM and Severe Pulm HTN  None anion gap metabolic acidosis  Tachypnea  Remote history of seizures  Sepsis, present on admission, now resolved  Hyponatremia, resolved  UTI  Nonischemic cardiomyopathy  Chronic systolic congestive heart failure  Paroxysmal atrial fibrillation  CKD stage III  Thrombocytosis    HISTORY OF PRESENT ILLNESS (COPIED FROM ADMISSION H&P)  Patient is a 79-year-old  female who presented to the emergency department bedbound secondary to severe debilitating rheumatoid arthritis, underlying nonischemic cardiomyopathy. Was hospitalized recently and discharged after a heart failure episode and treated with diuretics and dobutamine. She had right and left heart catheterization. Also, she had pleurocentesis, 1 L  removed. Fluid did not show evidence of infection. She was restarted on methotrexate and prednisone for her severe rheumatoid arthritis. Today, came back with painful decubitus ulcer, which the patient said started developing while she was in the hospital last time. CBC showed white blood cell count of 1.3, hemoglobin 7.4, and platelet count 103. Urinalysis showed evidence of urinary tract infection. Chemistry showed GFR of 18, which is at baseline; BUN and creatinine of 59 and 2.69. CT scan of the abdomen showed bladder mural wall thickening suggestive of cystitis, small to moderate right and moderate left loculated pleural effusion, chronic pancreatitis. Patient will be admitted to the hospital for further management. Started on IV Rocephin in the emergency room.     HOSPITAL COURSE:  This is a brief summary of a prolonged hospitalization.  Patient was admitted with sepsis presumably due to infected decubitus ulcer.  Ultimately underwent debridement in the OR.  Had a waxing and waning course especially with regards to mental status.  She again became septic and antibiotics were expanded ultimately patient continued to decline, not taking oral intake very well.  Seen by palliative care and after numerous discussions on end-of-life issues, goals of care family elected for inpatient hospice.    PHYSICAL EXAM:  Temp:  [97.5 °F (36.4 °C)-97.8 °F (36.6 °C)] 97.5 °F (36.4 °C)  Pulse:  [60] 60  Resp:  [15-18] 18  BP: (140-157)/(65-82) 140/82  SpO2:  [100 %] 100 %  General: eldelry female, ill appearing, somnolent  Respiratory: Clear to auscultation bilaterally. No wheezes. No rhonchi.  Cardiovascular: S1, S2. Regular rate and rhythm. No murmurs, rubs or gallops.   Abdomen: Soft, nontender, nondistended.  Positive bowel sounds. No rebound or guarding.  : rivero in place  Neurologic: No focal neurological deficits.   Musculoskeletal: Moves all extremities.  Extremities: Bilateral UE swollen     DISCHARGE MEDICATIONS      Discharge Medications        ASK your doctor about these medications        Instructions Prescription details   alendronate 70 MG Tabs  Commonly known as: Fosamax      Take 1 tablet (70 mg total) by mouth once a week.   Quantity: 12 tablet  Refills: 3     amiodarone 200 MG Tabs  Commonly known as: Pacerone      Take 1 tablet (200 mg total) by mouth daily.   Refills: 0     bumetanide 1 MG Tabs  Commonly known as: Bumex      Take 1 tablet (1 mg total) by mouth daily.   Quantity: 30 tablet  Refills: 2     carvedilol 6.25 MG Tabs  Commonly known as: Coreg      Take 0.5 tablets (3.125 mg total) by mouth 2 (two) times daily with meals.   Quantity: 30 tablet  Refills: 2     cyclobenzaprine 10 MG Tabs  Commonly known as: Flexeril      Take 1 tablet (10 mg total) by mouth nightly.   Stop taking on: September 24, 2024  Quantity: 30 tablet  Refills: 1     dapagliflozin 10 MG Tabs  Commonly known as: Farxiga      Take 1 tablet (10 mg total) by mouth daily.   Quantity: 30 tablet  Refills: 5     fentaNYL 12 MCG/HR Pt72  Commonly known as: Duragesic      Place 1 patch onto the skin every third day.   Quantity: 10 patch  Refills: 0     ferrous sulfate 325 (65 FE) MG Tbec      Take 1 tablet (325 mg total) by mouth daily with breakfast.   Refills: 0     folic acid 800 MCG Tabs  Commonly known as: FOLVITE      Take 1 tablet (800 mcg total) by mouth daily.   Quantity: 90 tablet  Refills: 0     gabapentin 300 MG Caps  Commonly known as: Neurontin  Ask about: Should I take this medication?      Take 1 capsule (300 mg total) by mouth 2 (two) times daily.   Stop taking on: August 1, 2024  Quantity: 60 capsule  Refills: 0     gabapentin 300 MG Caps  Commonly known as: Neurontin      Take 1 capsule (300 mg total) by mouth 3 (three) times daily.   Quantity: 90 capsule  Refills: 0     HYDROcodone-acetaminophen  MG Tabs  Commonly known as: Norco  Ask about: Should I take this medication?      Take 1 tablet by mouth every 8 (eight) hours  as needed for Pain.   Stop taking on: August 24, 2024  Quantity: 60 tablet  Refills: 0     isosorbide dinitrate 20 MG Tabs  Commonly known as: Isordil      Take 1 tablet (20 mg total) by mouth TID (Nitrates).   Quantity: 90 tablet  Refills: 3     lidocaine-menthol 4-1 % Ptch      Place 1 patch onto the skin daily.   Quantity: 30 patch  Refills: 0     methotrexate 2.5 MG Tabs  Commonly known as: Rheumatrex      TAKE 6 TABLETS BY MOUTH 1 TIME A WEEK   Quantity: 77 tablet  Refills: 0     midodrine 5 MG Tabs  Commonly known as: ProAmatine      Take 1 tablet (5 mg total) by mouth 2 (two) times daily before meals.   Quantity: 60 tablet  Refills: 0     Naloxone HCl 4 MG/0.1ML Liqd      4 mg by Nasal route as needed. If patient remains unresponsive, repeat dose in other nostril 2-5 minutes after first dose.   Quantity: 1 kit  Refills: 0     pantoprazole 40 MG Tbec  Commonly known as: Protonix      TAKE 1 TABLET(40 MG) BY MOUTH TWICE DAILY BEFORE MEALS   Quantity: 180 tablet  Refills: 0     Polyethylene Glycol 3350 17 g Pack  Commonly known as: MIRALAX      Take 17 g by mouth daily as needed.   Refills: 0     predniSONE 20 MG Tabs  Commonly known as: Deltasone  Ask about: Should I take this medication?      Take 1 tablet (20 mg total) by mouth daily with breakfast for 5 days.   Stop taking on: July 28, 2024  Quantity: 5 tablet  Refills: 0     sacubitril-valsartan 49-51 MG Tabs  Commonly known as: Entresto      Take 1 tablet by mouth 2 (two) times daily.   Quantity: 60 tablet  Refills: 1     senna-docusate 8.6-50 MG Tabs  Commonly known as: Senokot-S      Take 2 tablets by mouth daily.   Quantity: 30 tablet  Refills: 0     Senna-Time 8.6 MG Tabs  Generic drug: sennosides      senna 8.6 mg tablet, [RxNorm: 780384]   Refills: 0     spironolactone 25 MG Tabs  Commonly known as: Aldactone       Refills: 0     Tylenol Extra Strength 500 MG Tabs  Generic drug: acetaminophen      Take 1 tablet (500 mg total) by mouth every 6 (six)  hours as needed for Pain.   Refills: 0              CONSULTANTS  Consultants         Provider   Role Specialty     Linsey Liao MD      Consulting Physician PULMONARY DISEASES     Radha Ahn MD      Consulting Physician NEPHROLOGY     Gabriel Massey DO      Consulting Physician Critical Care     Augustin Parsons DO      Consulting Physician PULMONARY DISEASES     Geoffrey Villareal MD      Consulting Physician INFECTIOUS DISEASES     Jose Pérez MD      Consulting Physician NEUROLOGY     Twin Boles MD      Consulting Physician Interventional, Cardiology     Eben Mckeon MD      Consulting Physician SURGERY, GENERAL            FOLLOW UP:  No follow-up provider specified.  The above plan and follow-up instructions were reviewed with the patient and they verbalized understanding and agreement.  They understand to return to the emergency room for any concerning signs or symptoms.  Greater than 30 minutes spent on discharge.  -----------------------    Electronically signed by Otoniel Vasquez MD on 9/10/2024  1:53 PM         REVIEWER COMMENTS

## 2024-09-11 NOTE — HOSPICE RN NOTE
[facility] Inpatient Nursing Rounds:     Patient seen at bedside without family present. Patient was grimacing, moaning and groaning during assessment. Patient said that she was in pain and wanted something for pain relief when asked. Patient remains on HF NC 3L, shallow, irregular respirations. Recommended Dilaudid IVP and possibly will initiate Dilaudid gtt.      Dilaudid IVP X 2, Scopolamine patch in the past 24 hours.     PPS: 20%    Patient remains eligible for general inpatient hospice care for symptom management of pain, dyspnea, and wound care requiring frequent nursing assessments and interventions including titration of IV medications. POC discussed with family and Ayush GIL. Requested for Ayush to Adalberto Serve MD/APN that is on for [facility] [employer], to set up the deactivation of patient's ICD. All are in agreement. [facility] will continue to support the patient and family.     Elizabeth Kearney RN  [facility] RN Liaison  [phone] (After-hours)

## 2024-09-11 NOTE — CM/SW NOTE
LSW visit to pt. Pt was lying in bed resting with her eyes closed. Pt's dtr Barbi was present at bedside. LSW provided emotional support to pt's dtr through active listening. LSW will continue to follow up with pt and family to offer support and address any ongoing needs.    Savita Hung, MARY  [facility]  [phone]

## 2024-09-12 NOTE — PLAN OF CARE
Pt Aox1 3L nasal canula. Mouth care and dressing changes completed. Pt repositioned for comfort. Dillaudid gtt increased for pain management. Suctioning done as needed. Pt with fall precautions maintained.   Problem: PAIN - ADULT  Goal: Verbalizes/displays adequate comfort level or patient's stated pain goal  Description: INTERVENTIONS:  - Encourage pt to monitor pain and request assistance  - Assess pain using appropriate pain scale  - Administer analgesics based on type and severity of pain and evaluate response  - Implement non-pharmacological measures as appropriate and evaluate response  - Consider cultural and social influences on pain and pain management  - Manage/alleviate anxiety  - Utilize distraction and/or relaxation techniques  - Monitor for opioid side effects  - Notify MD/LIP if interventions unsuccessful or patient reports new pain  - Anticipate increased pain with activity and pre-medicate as appropriate  Outcome: Progressing

## 2024-09-12 NOTE — HOSPICE RN NOTE
[facility] Inpatient Nursing Rounds:     Patient seen at bedside without family present. Patient was getting cleaned up and repositioned during assessment. Patient with frown and facial grimacing. Breathing shallow, non labored. ICD rep has been contacted for deactivation.      Dilaudid gtt. 0.4mg/hr, Scopolamine patch, Dilaudid IVP X 1 all in the past 24 hours.     PPS: 20%    Patient remains eligible for general inpatient hospice care for symptom management of dyspnea, pain and wound care requiring frequent nursing assessments and interventions including titration of IV medications. POC discussed with Radha GIL and family. All are in agreement. [facility] will continue to support the patient and family.     Elizabeth Kearney RN  [facility] RN Liaison  [phone] (After-hours)

## 2024-09-12 NOTE — PLAN OF CARE
Patient resting comfortably, minimally responsive, family at bedside. Voiding via rivero. Midline, IV and sacral dressings changed. Dilaudid drip running at 0.4mg/hr. Oral care and frequent repositioning. Call light within reach, fall precautions in place, patient unable to call appropriately, frequent rounding continued.    Problem: PAIN - ADULT  Goal: Verbalizes/displays adequate comfort level or patient's stated pain goal  Description: INTERVENTIONS:  - Encourage pt to monitor pain and request assistance  - Assess pain using appropriate pain scale  - Administer analgesics based on type and severity of pain and evaluate response  - Implement non-pharmacological measures as appropriate and evaluate response  - Consider cultural and social influences on pain and pain management  - Manage/alleviate anxiety  - Utilize distraction and/or relaxation techniques  - Monitor for opioid side effects  - Notify MD/LIP if interventions unsuccessful or patient reports new pain  - Anticipate increased pain with activity and pre-medicate as appropriate  Outcome: Not Progressing

## 2024-09-12 NOTE — PLAN OF CARE
Notified by RN that patient is going hospice. [employer] rep contacted, rep will be sent out to deactivate ICD therapy. Formal EP MD order needs to be in place to deactivate, will discuss with Dr. Sargent.     Discussed with RN, Ayush on 9/11/24, that if expiration is imminent, a magnet can be applied to temporarily deactivate the device to avoid a potential shock.       Delisa Bey, KODI  09/12/24  9:21 AM  [phone] [place]  [phone] [place]

## 2024-09-12 NOTE — CM/SW NOTE
LSW visit to pt. Pt was lying in bed and appeared lethargic. Pt's dtr Barbi and several other family members were present at bedside. Pt's dtr followed LSW out of room and they discussed the family dynamics. LSW provided emotional support through active listening and validation. LSW will continue to follow up with pt and family to offer support.    Savita Hung, MARY  [facility]  [id number]

## 2024-09-12 NOTE — CONSULTS
[facility] - ELECTROPHYSIOLOGY CONSULT NOTE    Margaret Silverio Patient Status:  Inpatient    3/14/1945 MRN [id number]   Location [facility] 4W/SW/SE Attending Michelle Buck MD   Hosp Day # 2 PCP Johnathon Rodriguez DO     Date of Admission:  9/10/2024  Date of Consult:  2024  I was asked by general cardiology, Dr. Puckett, and Michelle Buck MD to provide recommendations for evaluation and management of EP rhythm issues.  Impression:     Sepsis  Failure to thrive  Infected sacral decubitus ulcer  Acute metabolic encephalopathy  GEORGE/cardiorenal  Severe Nonischmeic CM and Severe Pulm HTN  None anion gap metabolic acidosis  Tachypnea  Remote history of seizures  Sepsis, present on admission, now resolved  Hyponatremia, resolved  UTI  Nonischemic cardiomyopathy  Chronic systolic congestive heart failure  Paroxysmal atrial fibrillation  CKD stage III  Thrombocytosis    Hospice, comfort care    Recommendations:    ICD VT/VF detection to be turned off today    Reason for Consultation:     Hospice, deactivation of icd    History of Present Illness:   Patient is a 79 year old female who was admitted to the hospital for shock.    She has a  history of severe nonischemic cardiomyopathy HFrEF, CKD , pacemaker , h/p AVR , severe RA with joint deformities , chronic basilar pleural effusion s/p right thora in 2024 (negative cytology and culture )  Poor functional status , chronic sacral decub .     Admitted on 2024 with infected sacral decub and UTI and pancytopenia secondary to methotrexate.  Patient evaluated by ID and hospitalist and renal and surgery with very prolonged hospital course.     Transferred to ICU with severe lethargy and obtundation with worsening metabolic acidosis with lactic acid of 6.2 with a pH of 7.23 and pCO2 of 21 and pO2 of 78  Significant leukocytosis  Patient obtunded and very lethargic not providing any history  Comfortable on 4 L of oxygen with 98% O2  sat  Sepsis reassessment protocol was started patient received sepsis bolus  Chest x-ray with chronic changes  Patient now in ICU  Chart and notes and labs and records are reviewed  No further review of system able to obtain from patient    She is now on hospice and requests ICD deactivation    Cardiac tests:    Past Medical History  Past Medical History:    Acute kidney insufficiency    Anemia    Arrhythmia    Back problem    CHF (congestive heart failure) (HCC)    CKD (chronic kidney disease) stage 3, GFR 30-59 ml/min (HCC)    Deep vein thrombosis (HCC)    on B.T for only a month, possibly Magen    Essential hypertension    High blood pressure    History of blood transfusion    Rheumatoid arthritis (HCC)    Seizure disorder (HCC)    Pt denied at screening call 6/28/24       Past Surgical History  Past Surgical History:   Procedure Laterality Date    Cabg      Cardiac pacemaker placement      Colonoscopy N/A 01/17/2024    Dr. Rios    Colonoscopy N/A 01/17/2024    Procedure: COLONOSCOPY;  Surgeon: Zoraida Rios MD;  Location: [facility] ENDOSCOPY    Egd N/A 01/17/2024    Dr. Rios    Fracture surgery      Other surgical history  2017    Heart Surgery     Other surgical history  2020    Bilateral shoulder replacement        Family History  No family history on file.    Social History  Pediatric History   Patient Parents    Not on file     Other Topics Concern    Not on file   Social History Narrative    Not on file           Current Medications:  Current Facility-Administered Medications   Medication Dose Route Frequency    sodium chloride 0.9% 0.9% flush injection 10 mL  10 mL Intravenous PRN    acetaminophen (Tylenol) rectal suppository 650 mg  650 mg Rectal Q4H PRN    HYDROmorphone in sodium chloride 0.9% (Dilaudid) 20 mg/100mL infusion premix  0.2 mg/hr Intravenous Continuous    haloperidol lactate (Haldol) 5 MG/ML injection 1 mg  1 mg Intravenous Q1H PRN    Or    haloperidol lactate (Haldol) 5 MG/ML injection 2 mg  2 mg  Intravenous Q1H PRN    LORazepam (Ativan) 2 mg/mL injection 1 mg  1 mg Intravenous Q4H PRN    Or    LORazepam (Ativan) 2 mg/mL injection 2 mg  2 mg Intravenous Q4H PRN    HYDROmorphone (Dilaudid) 1 MG/ML injection 0.5 mg  0.5 mg Intravenous Q1H PRN    Or    HYDROmorphone (Dilaudid) 1 MG/ML injection 1 mg  1 mg Intravenous Q1H PRN    scopolamine (Transderm-Scop) 1 MG/3DAYS patch 1 patch  1 patch Transdermal Q72H    furosemide (Lasix) 10 mg/mL injection 40 mg  40 mg Intravenous Q8H PRN    glycopyrrolate (Robinul) 0.2 MG/ML injection 0.4 mg  0.4 mg Intravenous Q3H PRN    ondansetron (Zofran) 4 MG/2ML injection 4 mg  4 mg Intravenous Q6H PRN    bisacodyl (Dulcolax) 10 MG rectal suppository 10 mg  10 mg Rectal Daily PRN    polyethylene glycol (PEG 3350) (Miralax) 17 g oral packet 17 g  17 g Oral Daily PRN    albuterol (Ventolin) (2.5 MG/3ML) 0.083% nebulizer solution 2.5 mg  2.5 mg Nebulization Q4H PRN     Medications Prior to Admission   Medication Sig    bumetanide 1 MG Oral Tab Take 1 tablet (1 mg total) by mouth daily.    carvedilol 6.25 MG Oral Tab Take 0.5 tablets (3.125 mg total) by mouth 2 (two) times daily with meals.    sennosides (SENNA-TIME) 8.6 MG Oral Tab senna 8.6 mg tablet, [RxNorm: 514761]    cyclobenzaprine 10 MG Oral Tab Take 1 tablet (10 mg total) by mouth nightly.    [] HYDROcodone-acetaminophen (NORCO)  MG Oral Tab Take 1 tablet by mouth every 8 (eight) hours as needed for Pain.    fentaNYL 12 MCG/HR Transdermal Patch 72 Hr Place 1 patch onto the skin every third day. (Patient taking differently: Place 1 patch onto the skin every third day. Placed 8/10 left arm)    midodrine 5 MG Oral Tab Take 1 tablet (5 mg total) by mouth 2 (two) times daily before meals.    polyethylene glycol, PEG 3350, 17 g Oral Powd Pack Take 17 g by mouth daily as needed.    [] predniSONE 20 MG Oral Tab Take 1 tablet (20 mg total) by mouth daily with breakfast for 5 days.    senna-docusate 8.6-50 MG Oral  Tab Take 2 tablets by mouth daily.    Naloxone HCl 4 MG/0.1ML Nasal Liquid 4 mg by Nasal route as needed. If patient remains unresponsive, repeat dose in other nostril 2-5 minutes after first dose.    acetaminophen (TYLENOL EXTRA STRENGTH) 500 MG Oral Tab Take 1 tablet (500 mg total) by mouth every 6 (six) hours as needed for Pain.    isosorbide dinitrate 20 MG Oral Tab Take 1 tablet (20 mg total) by mouth TID (Nitrates).    gabapentin 300 MG Oral Cap Take 1 capsule (300 mg total) by mouth 3 (three) times daily.    dapagliflozin (FARXIGA) 10 MG Oral Tab Take 1 tablet (10 mg total) by mouth daily.    spironolactone 25 MG Oral Tab     alendronate 70 MG Oral Tab Take 1 tablet (70 mg total) by mouth once a week. (Patient taking differently: Take 1 tablet (70 mg total) by mouth once a week. Every Tuesday)    methotrexate 2.5 MG Oral Tab TAKE 6 TABLETS BY MOUTH 1 TIME A WEEK    [] gabapentin 300 MG Oral Cap Take 1 capsule (300 mg total) by mouth 2 (two) times daily. (Patient not taking: Reported on 2024)    lidocaine-menthol 4-1 % External Patch Place 1 patch onto the skin daily.    PANTOPRAZOLE 40 MG Oral Tab EC TAKE 1 TABLET(40 MG) BY MOUTH TWICE DAILY BEFORE MEALS    SACUBITRIL-VALSARTAN 49-51 MG Oral Tab Take 1 tablet by mouth 2 (two) times daily.    folic acid 800 MCG Oral Tab Take 1 tablet (800 mcg total) by mouth daily.    amiodarone 200 MG Oral Tab Take 1 tablet (200 mg total) by mouth daily.    ferrous sulfate 325 (65 FE) MG Oral Tab EC Take 1 tablet (325 mg total) by mouth daily with breakfast.       Allergies  Allergies   Allergen Reactions    Lisinopril Coughing       Review of Systems:   10 pt ROS performed, separate from HPI  Review of Systems:  GENERAL: no fevers, chills, sweats  HEENT: no visual or hearing changes  SKIN: denies any unusual skin lesions or rashes  RESPIRATORY: denies shortness of breath with exertion  CARDIOVASCULAR: no active chest pain, no claudication  GI: denies abdominal  pain and denies heartburn  : no dysuria or hematuria  NEURO: denies headaches, focal weaknesses or paresthesias  All other systems reviewed and negative.  fatigue is present  All other systems were reviewed are negative  Physical Exam:   Blood pressure 145/85, pulse 65, temperature 98 °F (36.7 °C), temperature source Oral, resp. rate 20, SpO2 99%.    Scheduled Meds:    scopolamine  1 patch Transdermal Q72H         Physical Exam:    General: Alert and oriented. No apparent distress. No respiratory or constitutional distress.  HEENT: Normocephalic, anicteric sclera, neck supple  Neck: No JVD, carotids 2+, supple  Cardiac: Regular rate. No pathologic murmur.  Lungs: Clear with normal effort.  Normal excursions and effort.  Abdomen: Soft, non-tender. BS-present.  Extremities: Without clubbing, cyanosis.  Peripheral pulsespresent.  Neurologic: Alert and oriented, normal affect. Motor ok.  Skin: Warm and dry.     Results:     Laboratory Data:  Lab Results   Component Value Date    WBC 9.5 09/10/2024    HGB 8.2 (L) 09/10/2024    HCT 26.1 (L) 09/10/2024    .0 09/10/2024    CREATSERUM 1.39 (H) 09/10/2024    BUN 30 (H) 09/10/2024     09/10/2024    K 3.9 09/10/2024     09/10/2024    CO2 21.0 09/10/2024    GLU 95 09/10/2024    CA 8.9 09/10/2024    ALB 2.7 (L) 09/10/2024    ALKPHO 198 (H) 09/07/2024    TP 5.5 (L) 09/07/2024     (H) 09/07/2024    ALT 79 (H) 09/07/2024    PTT 39.2 (H) 09/04/2024    INR 1.72 (H) 09/04/2024    PTP 21.2 (H) 09/04/2024    T4F 1.1 09/03/2024    TSH 6.866 (H) 09/03/2024    LIP 32 01/13/2024    ESRML 40 (H) 07/17/2024    CRP 9.00 (H) 07/17/2024    MG 2.1 09/08/2024    PHOS 4.8 09/10/2024    B12 >2,000 (H) 07/01/2024         Imaging: I independently visualized all relevant chest imaging in PACS, agree with radiology interpretation except where noted.  Thank you for allowing me to participate in the care of your patient.    Luis Sargent MD  [facility]  [place]  Cardiac Electrophysiology  9/12/2024    CCT 30    Critical care provider statement:     Critical care time (minutes):  30    Critical care time was exclusive of:  Separately billable procedures and treating other patients    Critical care was necessary to treat or prevent imminent or life-threatening deterioration of the following conditions:  Cardiac failure, Hospice palliative care, needs ICD deactivated    Critical care was time spent personally by me on the following activities:  Development of treatment plan with patient or surrogate, discussions with consultants, discussions with primary provider, evaluation of patient's response to treatment, examination of patient, obtaining history from patient or surrogate, ordering and performing treatments and interventions, ordering and review of laboratory studies, ordering and review of radiographic studies, pulse oximetry and re-evaluation of patient's condition.

## 2024-09-12 NOTE — PROGRESS NOTES
[facility]  part of [facility]    Progress Note    Margaret Silverio Patient Status:  Inpatient    3/14/1945 MRN [id number]   Location [facility] 4W/SW/SE Attending Michelle Buck MD   Hosp Day # 2 PCP Johnathon Rodriguez DO     Chief complaint: failure to thrive  Subjective:     Looks comfortable, decr responsiveness, no fevers    Objective:   Blood pressure 138/78, pulse 77, temperature 98.3 °F (36.8 °C), temperature source Axillary, resp. rate 18, SpO2 98%.    GEN: looks ill  HEENT: eyes closed, facial swelling  Neck:  supple  Pulmonary:  course and diminished to auscultation bilaterally  Cardiovascular: Loud heart murmur  Abdominal: soft  Extremities: +edema  Pulses: palpable   Psychiatric: calm    Assessment and Plan:     Sepsis  Failure to thrive  Infected sacral decubitus ulcer  Acute metabolic encephalopathy  GEORGE/cardiorenal  Severe Nonischmeic CM and Severe Pulm HTN  None anion gap metabolic acidosis  Tachypnea  Remote history of seizures  Sepsis, present on admission, now resolved  Hyponatremia, resolved  UTI  Nonischemic cardiomyopathy  Chronic systolic congestive heart failure  Paroxysmal atrial fibrillation  CKD stage III  Thrombocytosis     Dispo: continue comfort care    D/w RN      Results:     Lab Results   Component Value Date    WBC 9.5 09/10/2024    HGB 8.2 (L) 09/10/2024    HCT 26.1 (L) 09/10/2024    .0 09/10/2024    CREATSERUM 1.39 (H) 09/10/2024    BUN 30 (H) 09/10/2024     09/10/2024    K 3.9 09/10/2024     09/10/2024    CO2 21.0 09/10/2024    GLU 95 09/10/2024    CA 8.9 09/10/2024    ALB 2.7 (L) 09/10/2024    ALKPHO 198 (H) 2024    BILT 0.7 2024    TP 5.5 (L) 2024     (H) 2024    ALT 79 (H) 2024    PTT 39.2 (H) 2024    INR 1.72 (H) 2024    T4F 1.1 2024    TSH 6.866 (H) 2024    LIP 32 2024    ESRML 40 (H) 2024    CRP 9.00 (H) 2024    MG 2.1 2024     PHOS 4.8 09/10/2024    B12 >2,000 (H) 07/01/2024       No results found.          CEE CHIU MD  9/12/2024

## 2024-09-13 NOTE — HOSPICE RN NOTE
Pt with eyes open with no eye contact, no evidence of meaningful interaction.  RR 12, irregular with 10-20 sec apnea, very shallow, clear.  She is tense and gives an appearance of being frightened as she takes deep gasping breaths after apnea.  Dilaudid drip is infusing at 2mg/h.  One family member present; daughter has asked that we do not talk about hospice and her wishes were respected.  POC to provide an IVP dose of dilaudid and to increase the drip to 0.6mg/h was reviewed to assure comfort r/t change in breathing pattern with JEFFERY Berry and a phone call was placed to daughter Barbi as she had not been present, and she and RN were both in agreement with POC.  In the past 24 hours, she has received 2 IVP doses of dilaudid and continues with scopolamine patch.

## 2024-09-13 NOTE — PLAN OF CARE
Patient is Alert, on 2L O2, x2 max assist, rivero in place, no BM overnight, decreased appetite, Dilaudid gtt continued at 0.4mg/hr, patient appears comfortable, vital signs stable, no acute changes overnight. Call light within reach, safety measures in place, frequent rounding done, plan of care continued.     Problem: PAIN - ADULT  Goal: Verbalizes/displays adequate comfort level or patient's stated pain goal  Description: INTERVENTIONS:  - Encourage pt to monitor pain and request assistance  - Assess pain using appropriate pain scale  - Administer analgesics based on type and severity of pain and evaluate response  - Implement non-pharmacological measures as appropriate and evaluate response  - Consider cultural and social influences on pain and pain management  - Manage/alleviate anxiety  - Utilize distraction and/or relaxation techniques  - Monitor for opioid side effects  - Notify MD/LIP if interventions unsuccessful or patient reports new pain  - Anticipate increased pain with activity and pre-medicate as appropriate  Outcome: Progressing

## 2024-09-13 NOTE — CM/SW NOTE
LSW visit to pt. Pt was lying in bed with eyes closed and appeared lethargic. Pt's dtr Barbi and pt's son were present at bedside. Pt's dtr followed LSW out of the room and gave LSW the  home information. LSW added the information in pt's chart as a CM sticky note. LSW provided emotional support to pt's dtr through active listening. LSW will continue to follow up with pt and family for support.      MARY Khalil  [facility]  [id number]

## 2024-09-13 NOTE — PLAN OF CARE
Margaret is opening her eyes, not responding to questions only to her name. Voiding via rivero, no BM. Max assist. Family at bedside. IVP Dilaudid given. Dilaudid drip titrated and continued. Scope patch replaced. All updates on medication and status is communicated to the POA. No information is given to any other family member. Call light within reach, family uses appropriately. Safety precautions in place.     Problem: PAIN - ADULT  Goal: Verbalizes/displays adequate comfort level or patient's stated pain goal  Description: INTERVENTIONS:  - Encourage pt to monitor pain and request assistance  - Assess pain using appropriate pain scale  - Administer analgesics based on type and severity of pain and evaluate response  - Implement non-pharmacological measures as appropriate and evaluate response  - Consider cultural and social influences on pain and pain management  - Manage/alleviate anxiety  - Utilize distraction and/or relaxation techniques  - Monitor for opioid side effects  - Notify MD/LIP if interventions unsuccessful or patient reports new pain  - Anticipate increased pain with activity and pre-medicate as appropriate  Outcome: Progressing     Problem: COPING  Goal: Pt/Family able to verbalize concerns and demonstrate effective coping strategies  Description: INTERVENTIONS:  - Assist patient/family to identify coping skills, available support systems and cultural and spiritual values  - Provide emotional support, including active listening and acknowledgement of concerns of patient and caregivers  - Reduce environmental stimuli, as able  - Instruct patient/family in relaxation techniques, as appropriate  - Assess for spiritual and psychosocial needs and initiate Spiritual Care or Behavioral Health consult as needed  Outcome: Progressing     Problem: DEATH & DYING  Goal: Pt/Family communicate acceptance of impending death and feel psychological comfort and peace  Description: INTERVENTIONS:  - Assess  patient/family anxiety and grief process related to end of life issues  - Provide emotional and spiritual support  - Provide information about the patient’s health status with consideration of family and cultural values  - Communicate willingness to discuss death and facilitate grief process  with patient/family as appropriate  - Emphasize sustaining relationships within family system and community, or [place]/spiritual traditions  - Initiate Spiritual Care consult as needed  Outcome: Progressing     Problem: CONFUSION  Goal: Confusion, delirium, dementia or psychosis is improved or at baseline  Description: INTERVENTIONS:  - Assess for possible contributors to thought disturbance, including medications, impaired vision or hearing, underlying metabolic abnormalities, dehydration, psychiatric diagnoses, and notify attending MD/LIP  - [place] high risk fall precautions, as indicated  - Provide frequent short contacts to provide reality reorientation, refocusing and direction  - Decrease environmental stimuli, including noise as appropriate  - Monitor and intervene to maintain adequate nutrition, hydration, elimination, sleep and activity  - Consider the need for a sitter if unable to leave patient unattended  - Initiate Spiritual Care consult as indicated  - Consult Pharmacy as needed  Outcome: Progressing     Problem: SPIRITUAL CARE  Goal: Pt/Family able to move forward in process of forgiving self, others and/or higher power  Description: INTERVENTIONS:  - Assist patient to overcome blocks to healing by use of spiritual practices (prayer, meditation, guided imagery, reiki, breath work, etc.)  - De-myth guilt and help patient/family identify possible irrational spiritual/cultural beliefs and values  - Explore possibilities of making amends & reconciliation with self, others, and/or a greater power  - Guide patient/family in identifying painful feelings of guilt  - Help patient explore and identify spiritual beliefs,  cultural understandings or values that may help or hinder letting go of issue  - Help patient explore feelings of anger, bitterness, resentment  - Help patient/family identify and examine the situation in which these feelings are experienced  - Help patient/family identify destructive displacement of feelings onto other individuals  - Invite use of sacraments/rituals/ceremonies as appropriate (e.g. - confession, anointing, smudging)  -. Refer patient to formal counseling and/or to [place] community for further support work  Outcome: Progressing  Goal: Pt feels balance and connection with higher power that empowers the self during times of loss, guilt and fear  Description: INTERVENTIONS:  - Create safety for patient through empathic presence and non-judgmental listening  - Encourage patient to explore his/her values, beliefs and/or spiritual images and practices  - Encourage use of breath work, imagery, meditation, relaxation, reiki to ease distress and provide healing  - Encourage use of cultural and spiritual celebrations and rituals  - Facilitate discussion that helps patient sort out spiritual concerns  - Help patient identify where meaning/hope/comfort & strength are in his/her life  - Refer patient to [place] community for assistance, as appropriate  - Respond to patient/family need for prayer/ritual/sacrament/ceremony  Outcome: Progressing

## 2024-09-14 NOTE — PLAN OF CARE
Patient appears comfortable, multiple family members at bedside. Dilaudid drip & IVP in use, lethargic. Dressing change to sacrum. Repositioning, max. Call light within reach, using appropriately.     Problem: PAIN - ADULT  Goal: Verbalizes/displays adequate comfort level or patient's stated pain goal  Description: INTERVENTIONS:  - Encourage pt to monitor pain and request assistance  - Assess pain using appropriate pain scale  - Administer analgesics based on type and severity of pain and evaluate response  - Implement non-pharmacological measures as appropriate and evaluate response  - Consider cultural and social influences on pain and pain management  - Manage/alleviate anxiety  - Utilize distraction and/or relaxation techniques  - Monitor for opioid side effects  - Notify MD/LIP if interventions unsuccessful or patient reports new pain  - Anticipate increased pain with activity and pre-medicate as appropriate  Outcome: Progressing     Problem: COPING  Goal: Pt/Family able to verbalize concerns and demonstrate effective coping strategies  Description: INTERVENTIONS:  - Assist patient/family to identify coping skills, available support systems and cultural and spiritual values  - Provide emotional support, including active listening and acknowledgement of concerns of patient and caregivers  - Reduce environmental stimuli, as able  - Instruct patient/family in relaxation techniques, as appropriate  - Assess for spiritual and psychosocial needs and initiate Spiritual Care or Behavioral Health consult as needed  Outcome: Progressing     Problem: DEATH & DYING  Goal: Pt/Family communicate acceptance of impending death and feel psychological comfort and peace  Description: INTERVENTIONS:  - Assess patient/family anxiety and grief process related to end of life issues  - Provide emotional and spiritual support  - Provide information about the patient’s health status with consideration of family and cultural values  -  Communicate willingness to discuss death and facilitate grief process  with patient/family as appropriate  - Emphasize sustaining relationships within family system and community, or [place]/spiritual traditions  - Initiate Spiritual Care consult as needed  Outcome: Progressing     Problem: CONFUSION  Goal: Confusion, delirium, dementia or psychosis is improved or at baseline  Description: INTERVENTIONS:  - Assess for possible contributors to thought disturbance, including medications, impaired vision or hearing, underlying metabolic abnormalities, dehydration, psychiatric diagnoses, and notify attending MD/LIP  - [place] high risk fall precautions, as indicated  - Provide frequent short contacts to provide reality reorientation, refocusing and direction  - Decrease environmental stimuli, including noise as appropriate  - Monitor and intervene to maintain adequate nutrition, hydration, elimination, sleep and activity  - Consider the need for a sitter if unable to leave patient unattended  - Initiate Spiritual Care consult as indicated  - Consult Pharmacy as needed  Outcome: Progressing     Problem: SPIRITUAL CARE  Goal: Pt/Family able to move forward in process of forgiving self, others and/or higher power  Description: INTERVENTIONS:  - Assist patient to overcome blocks to healing by use of spiritual practices (prayer, meditation, guided imagery, reiki, breath work, etc.)  - De-myth guilt and help patient/family identify possible irrational spiritual/cultural beliefs and values  - Explore possibilities of making amends & reconciliation with self, others, and/or a greater power  - Guide patient/family in identifying painful feelings of guilt  - Help patient explore and identify spiritual beliefs, cultural understandings or values that may help or hinder letting go of issue  - Help patient explore feelings of anger, bitterness, resentment  - Help patient/family identify and examine the situation in which these  feelings are experienced  - Help patient/family identify destructive displacement of feelings onto other individuals  - Invite use of sacraments/rituals/ceremonies as appropriate (e.g. - confession, anointing, smudging)  -. Refer patient to formal counseling and/or to [place] community for further support work  Outcome: Progressing  Goal: Pt feels balance and connection with higher power that empowers the self during times of loss, guilt and fear  Description: INTERVENTIONS:  - Create safety for patient through empathic presence and non-judgmental listening  - Encourage patient to explore his/her values, beliefs and/or spiritual images and practices  - Encourage use of breath work, imagery, meditation, relaxation, reiki to ease distress and provide healing  - Encourage use of cultural and spiritual celebrations and rituals  - Facilitate discussion that helps patient sort out spiritual concerns  - Help patient identify where meaning/hope/comfort & strength are in his/her life  - Refer patient to [place] community for assistance, as appropriate  - Respond to patient/family need for prayer/ritual/sacrament/ceremony  Outcome: Progressing

## 2024-09-14 NOTE — PLAN OF CARE
Problem: PAIN - ADULT  Goal: Verbalizes/displays adequate comfort level or patient's stated pain goal  Description: INTERVENTIONS:  - Encourage pt to monitor pain and request assistance  - Assess pain using appropriate pain scale  - Administer analgesics based on type and severity of pain and evaluate response  - Implement non-pharmacological measures as appropriate and evaluate response  - Consider cultural and social influences on pain and pain management  - Manage/alleviate anxiety  - Utilize distraction and/or relaxation techniques  - Monitor for opioid side effects  - Notify MD/LIP if interventions unsuccessful or patient reports new pain  - Anticipate increased pain with activity and pre-medicate as appropriate  Outcome: Progressing     Problem: COPING  Goal: Pt/Family able to verbalize concerns and demonstrate effective coping strategies  Description: INTERVENTIONS:  - Assist patient/family to identify coping skills, available support systems and cultural and spiritual values  - Provide emotional support, including active listening and acknowledgement of concerns of patient and caregivers  - Reduce environmental stimuli, as able  - Instruct patient/family in relaxation techniques, as appropriate  - Assess for spiritual and psychosocial needs and initiate Spiritual Care or Behavioral Health consult as needed  Outcome: Progressing     Problem: DEATH & DYING  Goal: Pt/Family communicate acceptance of impending death and feel psychological comfort and peace  Description: INTERVENTIONS:  - Assess patient/family anxiety and grief process related to end of life issues  - Provide emotional and spiritual support  - Provide information about the patient’s health status with consideration of family and cultural values  - Communicate willingness to discuss death and facilitate grief process  with patient/family as appropriate  - Emphasize sustaining relationships within family system and community, or [place]/spiritual  traditions  - Initiate Spiritual Care consult as needed  Outcome: Progressing     Problem: CONFUSION  Goal: Confusion, delirium, dementia or psychosis is improved or at baseline  Description: INTERVENTIONS:  - Assess for possible contributors to thought disturbance, including medications, impaired vision or hearing, underlying metabolic abnormalities, dehydration, psychiatric diagnoses, and notify attending MD/LIP  - [place] high risk fall precautions, as indicated  - Provide frequent short contacts to provide reality reorientation, refocusing and direction  - Decrease environmental stimuli, including noise as appropriate  - Monitor and intervene to maintain adequate nutrition, hydration, elimination, sleep and activity  - Consider the need for a sitter if unable to leave patient unattended  - Initiate Spiritual Care consult as indicated  - Consult Pharmacy as needed  Outcome: Progressing     Problem: SPIRITUAL CARE  Goal: Pt/Family able to move forward in process of forgiving self, others and/or higher power  Description: INTERVENTIONS:  - Assist patient to overcome blocks to healing by use of spiritual practices (prayer, meditation, guided imagery, reiki, breath work, etc.)  - De-myth guilt and help patient/family identify possible irrational spiritual/cultural beliefs and values  - Explore possibilities of making amends & reconciliation with self, others, and/or a greater power  - Guide patient/family in identifying painful feelings of guilt  - Help patient explore and identify spiritual beliefs, cultural understandings or values that may help or hinder letting go of issue  - Help patient explore feelings of anger, bitterness, resentment  - Help patient/family identify and examine the situation in which these feelings are experienced  - Help patient/family identify destructive displacement of feelings onto other individuals  - Invite use of sacraments/rituals/ceremonies as appropriate (e.g. - confession,  anointing, smudging)  -. Refer patient to formal counseling and/or to [place] community for further support work  Outcome: Progressing  Goal: Pt feels balance and connection with higher power that empowers the self during times of loss, guilt and fear  Description: INTERVENTIONS:  - Create safety for patient through empathic presence and non-judgmental listening  - Encourage patient to explore his/her values, beliefs and/or spiritual images and practices  - Encourage use of breath work, imagery, meditation, relaxation, reiki to ease distress and provide healing  - Encourage use of cultural and spiritual celebrations and rituals  - Facilitate discussion that helps patient sort out spiritual concerns  - Help patient identify where meaning/hope/comfort & strength are in his/her life  - Refer patient to mare[place] [facility] for assistance, as appropriate  - Respond to patient/family need for prayer/ritual/sacrament/ceremony  Outcome: Progressing  Margaret is A/O to self. Pt appears to be resting comfortably, minimally responsive. On 2L O2. Voiding via rivero. Dilaudid gtt infusing. IVP Dilaudid given for breakthrough pain/dyspnea. Sacral dressing changed. Repositioned as ordered/tolerated. Fall and safety precautions maintained. Bed locked in the lowest position. Frequent rounding done. Comfort care continued.

## 2024-09-14 NOTE — PROGRESS NOTES
[facility]  part of [facility]    Progress Note    Margaret Silverio Patient Status:  Inpatient    3/14/1945 MRN [id number]   Location [facility] 4W/SW/SE Attending Michelle Buck MD   Hosp Day # 3 PCP Johnathon Rodriguez DO     Chief complaint: failure to thrive  Subjective:     Looks comfortable, decr responsiveness, no fevers  Family at bedside    Objective:   Blood pressure 127/62, pulse 60, temperature 98.5 °F (36.9 °C), temperature source Axillary, resp. rate 16, SpO2 100%.    GEN: looks ill  HEENT: eyes closed, facial swelling  Pulmonary:  course and diminished to auscultation bilaterally  Cardiovascular: Loud heart murmur  Extremities: +edema  Pulses: palpable   Psychiatric: calm    Assessment and Plan:     Sepsis  Failure to thrive  Infected sacral decubitus ulcer  Acute metabolic encephalopathy  GEORGE/cardiorenal  Severe Nonischmeic CM and Severe Pulm HTN  None anion gap metabolic acidosis  Tachypnea  Remote history of seizures  Sepsis, present on admission, now resolved  Hyponatremia, resolved  UTI  Nonischemic cardiomyopathy  Chronic systolic congestive heart failure  Paroxysmal atrial fibrillation  CKD stage III  Thrombocytosis     Dispo: continue comfort care    D/w RN      Results:     Lab Results   Component Value Date    WBC 9.5 09/10/2024    HGB 8.2 (L) 09/10/2024    HCT 26.1 (L) 09/10/2024    .0 09/10/2024    CREATSERUM 1.39 (H) 09/10/2024    BUN 30 (H) 09/10/2024     09/10/2024    K 3.9 09/10/2024     09/10/2024    CO2 21.0 09/10/2024    GLU 95 09/10/2024    CA 8.9 09/10/2024    ALB 2.7 (L) 09/10/2024    ALKPHO 198 (H) 2024    BILT 0.7 2024    TP 5.5 (L) 2024     (H) 2024    ALT 79 (H) 2024    PTT 39.2 (H) 2024    INR 1.72 (H) 2024    T4F 1.1 2024    TSH 6.866 (H) 2024    LIP 32 2024    ESRML 40 (H) 2024    CRP 9.00 (H) 2024    MG 2.1 2024    PHOS 4.8  09/10/2024    B12 >2,000 (H) 07/01/2024       No results found.          CEE CHIU MD  9/13/2024

## 2024-09-15 NOTE — CM/SW NOTE
LSW visit to pt. Pt was lying in bed with eyes open but appeared lethargic. Pt's dtr Barbi was present at bedside. LSW and Barbi discussed the change in pt's condition. LSW provided education on physical changes at end of life. LSW provided emotional support through active listening. LSW will continue to follow up with pt and family to offer support.      Savita Hung, MARY  [facility]  [id number]

## 2024-09-15 NOTE — HOSPICE RN NOTE
PATIENT FAMILY PRESENT ON ASSESSMENT. NO QUESTIONS OR CONCERNS NOTED OR REPORTED AT THIS TIME HOSPITAL NURSE RESHMA WITHOUT QUESTINS OR CONCERNS AT THIS TIME. FAMILY AND FACILITY RN AGREEABLE TO POC AS WRITTEN.     DORIS ALEMAN IS A 76 Y/O FEMALE WITH HOSPICE DX OF HYPER HRT AND CHRN KIDNEY DISEASE. PATIENT IS NOT ALERT OR ORIENTED. NON RESPONSIVE TO VERBAL OR TACTILE STIMULI AT THIS TIME. NEEDS ARE ANTICIPATED BY FAMILY AND STAFF. TOTAL ASSIST WITH ADL'S AND PERSONAL CARES. NO LONGER EATING OR DRINKING AT THIS TIME. BREATHING COMFORTABLY WITH CURRETN DILUADED DRIP ADJUSTMENTS YESTERDAY AND PRN ADMINSITRATION FOR SUPPORT WITH REPOSITION AND ACTIVITY. PATIENT BOWEL SOUNDS ARE HYPOACTIVE X4. NOTED TEMPORAL WASTING AND FRAILE PRESENTATION ON ASSESSMENT. +4 PITTING EDEMA TO BLE AND BUE. PAINAD 0-10 RELATED TO CURRENT DRIP RUNNING. PPS-10%    MEDICATIONS ADMINISTERED IN PAST 24 HOURS  DILUADED 1MG IVP X3  DILUADED 0.6 MG/HR GTT    PATIENT CONTINUES TO REQUIRE GIP LOC RELATED TO MANAGMENT OF DYPNEA AND PAIN WITH EOL PER MD FENTON.    MARYANNE HILL, HOSPICE RN    Entered by: Cici Ceron RN BSN  [facility] RN Liaison  [phone] (After-hours)

## 2024-09-15 NOTE — PLAN OF CARE
Problem: PAIN - ADULT  Goal: Verbalizes/displays adequate comfort level or patient's stated pain goal  Description: INTERVENTIONS:  - Encourage pt to monitor pain and request assistance  - Assess pain using appropriate pain scale  - Administer analgesics based on type and severity of pain and evaluate response  - Implement non-pharmacological measures as appropriate and evaluate response  - Consider cultural and social influences on pain and pain management  - Manage/alleviate anxiety  - Utilize distraction and/or relaxation techniques  - Monitor for opioid side effects  - Notify MD/LIP if interventions unsuccessful or patient reports new pain  - Anticipate increased pain with activity and pre-medicate as appropriate  Outcome: Progressing     Problem: COPING  Goal: Pt/Family able to verbalize concerns and demonstrate effective coping strategies  Description: INTERVENTIONS:  - Assist patient/family to identify coping skills, available support systems and cultural and spiritual values  - Provide emotional support, including active listening and acknowledgement of concerns of patient and caregivers  - Reduce environmental stimuli, as able  - Instruct patient/family in relaxation techniques, as appropriate  - Assess for spiritual and psychosocial needs and initiate Spiritual Care or Behavioral Health consult as needed  Outcome: Progressing     Problem: DEATH & DYING  Goal: Pt/Family communicate acceptance of impending death and feel psychological comfort and peace  Description: INTERVENTIONS:  - Assess patient/family anxiety and grief process related to end of life issues  - Provide emotional and spiritual support  - Provide information about the patient’s health status with consideration of family and cultural values  - Communicate willingness to discuss death and facilitate grief process  with patient/family as appropriate  - Emphasize sustaining relationships within family system and community, or [place]/spiritual  traditions  - Initiate Spiritual Care consult as needed  Outcome: Progressing     Problem: CONFUSION  Goal: Confusion, delirium, dementia or psychosis is improved or at baseline  Description: INTERVENTIONS:  - Assess for possible contributors to thought disturbance, including medications, impaired vision or hearing, underlying metabolic abnormalities, dehydration, psychiatric diagnoses, and notify attending MD/LIP  - [place] high risk fall precautions, as indicated  - Provide frequent short contacts to provide reality reorientation, refocusing and direction  - Decrease environmental stimuli, including noise as appropriate  - Monitor and intervene to maintain adequate nutrition, hydration, elimination, sleep and activity  - Consider the need for a sitter if unable to leave patient unattended  - Initiate Spiritual Care consult as indicated  - Consult Pharmacy as needed  Outcome: Progressing     Problem: SPIRITUAL CARE  Goal: Pt/Family able to move forward in process of forgiving self, others and/or higher power  Description: INTERVENTIONS:  - Assist patient to overcome blocks to healing by use of spiritual practices (prayer, meditation, guided imagery, reiki, breath work, etc.)  - De-myth guilt and help patient/family identify possible irrational spiritual/cultural beliefs and values  - Explore possibilities of making amends & reconciliation with self, others, and/or a greater power  - Guide patient/family in identifying painful feelings of guilt  - Help patient explore and identify spiritual beliefs, cultural understandings or values that may help or hinder letting go of issue  - Help patient explore feelings of anger, bitterness, resentment  - Help patient/family identify and examine the situation in which these feelings are experienced  - Help patient/family identify destructive displacement of feelings onto other individuals  - Invite use of sacraments/rituals/ceremonies as appropriate (e.g. - confession,  anointing, smudging)  -. Refer patient to formal counseling and/or to [place] community for further support work  Outcome: Progressing  Goal: Pt feels balance and connection with higher power that empowers the self during times of loss, guilt and fear  Description: INTERVENTIONS:  - Create safety for patient through empathic presence and non-judgmental listening  - Encourage patient to explore his/her values, beliefs and/or spiritual images and practices  - Encourage use of breath work, imagery, meditation, relaxation, reiki to ease distress and provide healing  - Encourage use of cultural and spiritual celebrations and rituals  - Facilitate discussion that helps patient sort out spiritual concerns  - Help patient identify where meaning/hope/comfort & strength are in his/her life  - Refer patient to [place] community for assistance, as appropriate  - Respond to patient/family need for prayer/ritual/sacrament/ceremony  Outcome: Progressing  [facility]IP hospice. Margaret appears to be resting comfortably in bed. Dilaudid gtt infusing, IVP dilaudid x1. Lethargic throughout the shift. Sacral dressing changed as ordered. Max assist, frequent repositioning. Bed locked in the lowest position. Frequent rounding done. Comfort measures maintained.

## 2024-09-15 NOTE — HOSPICE RN NOTE
[facility] afternoon rounds:  Pt remains minimally responsive and nonverbal with eyes open.  Niece bedside requesting writer wait for cousin to come to room.  Coco arrived shortly after.  EOL s/sx discussed.  Emotional support provided.  Daughter still requesting term \"comfort care\"  be used.  Pt continues to benefit from dilaudid 0.7 mg/hr .  Dilaudid 1 mg ivp given x 2 today.  PPS 10%.  Nathalie Erickson RN  [facility] TNL/SOC RN  [phone]

## 2024-09-15 NOTE — HOSPICE RN NOTE
Pt received in bed with eyes open wide seeming fearful or distressed.  Not tracking.  Nonverbal.  Breathing shallow, irregular and labored on 2.5 L N/C. Dilaudid continues to infuse at 0.7 mg/hr.  Pt received dilaudid 1 mg ivp last at 0410.  Pt condition reported to JEFFERY Bee.  Cristal to administer dilaudid 1 mg ivp and reevaluate to see if medication effective or if drip should be increased.     Nathalie Erickson RN  [facility] TNL/SOC RN  [phone]         No family bedisde at time of visit.

## 2024-09-15 NOTE — PLAN OF CARE
Patient appears comfortable, multiple family members at bedside. Dilaudid drip & IVP in use, lethargic. Dressing change to sacrum. Repositioning, max. Call light within reach, using appropriately.     Problem: PAIN - ADULT  Goal: Verbalizes/displays adequate comfort level or patient's stated pain goal  Description: INTERVENTIONS:  - Encourage pt to monitor pain and request assistance  - Assess pain using appropriate pain scale  - Administer analgesics based on type and severity of pain and evaluate response  - Implement non-pharmacological measures as appropriate and evaluate response  - Consider cultural and social influences on pain and pain management  - Manage/alleviate anxiety  - Utilize distraction and/or relaxation techniques  - Monitor for opioid side effects  - Notify MD/LIP if interventions unsuccessful or patient reports new pain  - Anticipate increased pain with activity and pre-medicate as appropriate  Outcome: Progressing     Problem: COPING  Goal: Pt/Family able to verbalize concerns and demonstrate effective coping strategies  Description: INTERVENTIONS:  - Assist patient/family to identify coping skills, available support systems and cultural and spiritual values  - Provide emotional support, including active listening and acknowledgement of concerns of patient and caregivers  - Reduce environmental stimuli, as able  - Instruct patient/family in relaxation techniques, as appropriate  - Assess for spiritual and psychosocial needs and initiate Spiritual Care or Behavioral Health consult as needed  Outcome: Progressing     Problem: DEATH & DYING  Goal: Pt/Family communicate acceptance of impending death and feel psychological comfort and peace  Description: INTERVENTIONS:  - Assess patient/family anxiety and grief process related to end of life issues  - Provide emotional and spiritual support  - Provide information about the patient’s health status with consideration of family and cultural values  -  Communicate willingness to discuss death and facilitate grief process  with patient/family as appropriate  - Emphasize sustaining relationships within family system and community, or [place]/spiritual traditions  - Initiate Spiritual Care consult as needed  Outcome: Progressing     Problem: CONFUSION  Goal: Confusion, delirium, dementia or psychosis is improved or at baseline  Description: INTERVENTIONS:  - Assess for possible contributors to thought disturbance, including medications, impaired vision or hearing, underlying metabolic abnormalities, dehydration, psychiatric diagnoses, and notify attending MD/LIP  - [place] high risk fall precautions, as indicated  - Provide frequent short contacts to provide reality reorientation, refocusing and direction  - Decrease environmental stimuli, including noise as appropriate  - Monitor and intervene to maintain adequate nutrition, hydration, elimination, sleep and activity  - Consider the need for a sitter if unable to leave patient unattended  - Initiate Spiritual Care consult as indicated  - Consult Pharmacy as needed  Outcome: Progressing     Problem: SPIRITUAL CARE  Goal: Pt/Family able to move forward in process of forgiving self, others and/or higher power  Description: INTERVENTIONS:  - Assist patient to overcome blocks to healing by use of spiritual practices (prayer, meditation, guided imagery, reiki, breath work, etc.)  - De-myth guilt and help patient/family identify possible irrational spiritual/cultural beliefs and values  - Explore possibilities of making amends & reconciliation with self, others, and/or a greater power  - Guide patient/family in identifying painful feelings of guilt  - Help patient explore and identify spiritual beliefs, cultural understandings or values that may help or hinder letting go of issue  - Help patient explore feelings of anger, bitterness, resentment  - Help patient/family identify and examine the situation in which these  feelings are experienced  - Help patient/family identify destructive displacement of feelings onto other individuals  - Invite use of sacraments/rituals/ceremonies as appropriate (e.g. - confession, anointing, smudging)  -. Refer patient to formal counseling and/or to [place] community for further support work  Outcome: Progressing  Goal: Pt feels balance and connection with higher power that empowers the self during times of loss, guilt and fear  Description: INTERVENTIONS:  - Create safety for patient through empathic presence and non-judgmental listening  - Encourage patient to explore his/her values, beliefs and/or spiritual images and practices  - Encourage use of breath work, imagery, meditation, relaxation, reiki to ease distress and provide healing  - Encourage use of cultural and spiritual celebrations and rituals  - Facilitate discussion that helps patient sort out spiritual concerns  - Help patient identify where meaning/hope/comfort & strength are in his/her life  - Refer patient to [place] community for assistance, as appropriate  - Respond to patient/family need for prayer/ritual/sacrament/ceremony  Outcome: Progressing

## 2024-09-16 NOTE — HOSPICE RN NOTE
[facility] Inpatient Nursing Rounds:     Hospice RN visited patient at bedside, no family present at this time. Patient remains somnolent, eyes open but nonverbal. Open mouth breathing, RR 16 and shallow. 2.5L NC. Scleral edema. 3-4+ pitting edema to BUE/BLE. Patient grimaced and moaned when attempting to reposition arms. Hooks intact with scant lyle urine. LBM: 9/10/24.     Reviewed symptom management over the past 24 hours: Dilaudid drip remains at 0.7 mg/hr. Dilaudid 1mg IVP x 3.     PPS: 10%    Patient remains appropriate for general inpatient hospice care for symptom management of uncontrolled pain requiring frequent nursing assessments and interventions including titration of IV medications. Interventions and treatments per Dr. Buck and Dr. Erickson. POC discussed with Maria C GIL. RN in agreement. [facility] will continue to support the patient and family.    Angy Ventura RN  [facility]  [phone] (office) or [phone] (after hours)

## 2024-09-16 NOTE — PROGRESS NOTES
[facility]  part of [facility]    Progress Note    Margaret Silverio Patient Status:  Inpatient    3/14/1945 MRN [id number]   Location [facility] 4W/SW/SE Attending Michelle Buck MD   Hosp Day # 6 PCP Johnathon Rodriguez DO     Chief complaint: failure to thrive  Subjective:     Looks comfortable, decr responsiveness, staring in space, increased edema  no fevers  No family at bedside    Objective:   Blood pressure 126/55, pulse 62, temperature 98.2 °F (36.8 °C), temperature source Axillary, resp. rate 14, SpO2 100%.    GEN: looks ill, edematous  HEENT: eyes open  Pulmonary:  course and diminished to auscultation bilaterally  Cardiovascular: Loud heart murmur  Extremities: +edema  Pulses: palpable   Psychiatric: calm    Assessment and Plan:     Sepsis  Failure to thrive  Infected sacral decubitus ulcer  Acute metabolic encephalopathy  GEORGE/cardiorenal  Severe Nonischmeic CM and Severe Pulm HTN  None anion gap metabolic acidosis  Tachypnea  Remote history of seizures  Sepsis, present on admission, now resolved  Hyponatremia, resolved  UTI  Nonischemic cardiomyopathy  Chronic systolic congestive heart failure  Paroxysmal atrial fibrillation  CKD stage III  Thrombocytosis     Dispo: continue comfort care    D/w RN      Results:     Lab Results   Component Value Date    WBC 9.5 09/10/2024    HGB 8.2 (L) 09/10/2024    HCT 26.1 (L) 09/10/2024    .0 09/10/2024    CREATSERUM 1.39 (H) 09/10/2024    BUN 30 (H) 09/10/2024     09/10/2024    K 3.9 09/10/2024     09/10/2024    CO2 21.0 09/10/2024    GLU 95 09/10/2024    CA 8.9 09/10/2024    ALB 2.7 (L) 09/10/2024    ALKPHO 198 (H) 2024    BILT 0.7 2024    TP 5.5 (L) 2024     (H) 2024    ALT 79 (H) 2024    PTT 39.2 (H) 2024    INR 1.72 (H) 2024    T4F 1.1 2024    TSH 6.866 (H) 2024    LIP 32 2024    ESRML 40 (H) 2024    CRP 9.00 (H) 2024    MG  2.1 09/08/2024    PHOS 4.8 09/10/2024    B12 >2,000 (H) 07/01/2024       No results found.          CEE CHIU MD  9/16/2024

## 2024-09-16 NOTE — PROGRESS NOTES
[facility]  part of [facility]    Progress Note    Margaret Silverio Patient Status:  Inpatient    3/14/1945 MRN [id number]   Location [facility] 4W/SW/SE Attending Michelle Buck MD   Hosp Day # 5 PCP Johnathon Rodriguez DO     Chief complaint: failure to thrive  Subjective:     Looks comfortable, decr responsiveness, staring in space, increased edema  no fevers  No family at bedside    Objective:   Blood pressure 130/83, pulse 63, temperature 98.2 °F (36.8 °C), temperature source Axillary, resp. rate 16, SpO2 97%.    GEN: looks ill, edematous  HEENT: eyes open  Pulmonary:  course and diminished to auscultation bilaterally  Cardiovascular: Loud heart murmur  Extremities: +edema  Pulses: palpable   Psychiatric: calm    Assessment and Plan:     Sepsis  Failure to thrive  Infected sacral decubitus ulcer  Acute metabolic encephalopathy  GEORGE/cardiorenal  Severe Nonischmeic CM and Severe Pulm HTN  None anion gap metabolic acidosis  Tachypnea  Remote history of seizures  Sepsis, present on admission, now resolved  Hyponatremia, resolved  UTI  Nonischemic cardiomyopathy  Chronic systolic congestive heart failure  Paroxysmal atrial fibrillation  CKD stage III  Thrombocytosis     Dispo: continue comfort care    D/w RN      Results:     Lab Results   Component Value Date    WBC 9.5 09/10/2024    HGB 8.2 (L) 09/10/2024    HCT 26.1 (L) 09/10/2024    .0 09/10/2024    CREATSERUM 1.39 (H) 09/10/2024    BUN 30 (H) 09/10/2024     09/10/2024    K 3.9 09/10/2024     09/10/2024    CO2 21.0 09/10/2024    GLU 95 09/10/2024    CA 8.9 09/10/2024    ALB 2.7 (L) 09/10/2024    ALKPHO 198 (H) 2024    BILT 0.7 2024    TP 5.5 (L) 2024     (H) 2024    ALT 79 (H) 2024    PTT 39.2 (H) 2024    INR 1.72 (H) 2024    T4F 1.1 2024    TSH 6.866 (H) 2024    LIP 32 2024    ESRML 40 (H) 2024    CRP 9.00 (H) 2024    MG 2.1  09/08/2024    PHOS 4.8 09/10/2024    B12 >2,000 (H) 07/01/2024       No results found.          CEE CHIU MD  9/15/2024

## 2024-09-16 NOTE — PLAN OF CARE
Patient is minimally responsive and appears to be resting comfortably. 2.5L NC. Hooks in place and draining. Dilaudid gtt continued at 0.7mg/hr. Wound dressing changed as ordered. Safety precautions and comfort measures in place.     Problem: PAIN - ADULT  Goal: Verbalizes/displays adequate comfort level or patient's stated pain goal  Description: INTERVENTIONS:  - Encourage pt to monitor pain and request assistance  - Assess pain using appropriate pain scale  - Administer analgesics based on type and severity of pain and evaluate response  - Implement non-pharmacological measures as appropriate and evaluate response  - Consider cultural and social influences on pain and pain management  - Manage/alleviate anxiety  - Utilize distraction and/or relaxation techniques  - Monitor for opioid side effects  - Notify MD/LIP if interventions unsuccessful or patient reports new pain  - Anticipate increased pain with activity and pre-medicate as appropriate  Outcome: Progressing     Problem: COPING  Goal: Pt/Family able to verbalize concerns and demonstrate effective coping strategies  Description: INTERVENTIONS:  - Assist patient/family to identify coping skills, available support systems and cultural and spiritual values  - Provide emotional support, including active listening and acknowledgement of concerns of patient and caregivers  - Reduce environmental stimuli, as able  - Instruct patient/family in relaxation techniques, as appropriate  - Assess for spiritual and psychosocial needs and initiate Spiritual Care or Behavioral Health consult as needed  Outcome: Progressing     Problem: DEATH & DYING  Goal: Pt/Family communicate acceptance of impending death and feel psychological comfort and peace  Description: INTERVENTIONS:  - Assess patient/family anxiety and grief process related to end of life issues  - Provide emotional and spiritual support  - Provide information about the patient’s health status with consideration  of family and cultural values  - Communicate willingness to discuss death and facilitate grief process  with patient/family as appropriate  - Emphasize sustaining relationships within family system and community, or [place]/spiritual traditions  - Initiate Spiritual Care consult as needed  Outcome: Progressing     Problem: CONFUSION  Goal: Confusion, delirium, dementia or psychosis is improved or at baseline  Description: INTERVENTIONS:  - Assess for possible contributors to thought disturbance, including medications, impaired vision or hearing, underlying metabolic abnormalities, dehydration, psychiatric diagnoses, and notify attending MD/LIP  - [place] high risk fall precautions, as indicated  - Provide frequent short contacts to provide reality reorientation, refocusing and direction  - Decrease environmental stimuli, including noise as appropriate  - Monitor and intervene to maintain adequate nutrition, hydration, elimination, sleep and activity  - Consider the need for a sitter if unable to leave patient unattended  - Initiate Spiritual Care consult as indicated  - Consult Pharmacy as needed  Outcome: Progressing     Problem: SPIRITUAL CARE  Goal: Pt/Family able to move forward in process of forgiving self, others and/or higher power  Description: INTERVENTIONS:  - Assist patient to overcome blocks to healing by use of spiritual practices (prayer, meditation, guided imagery, reiki, breath work, etc.)  - De-myth guilt and help patient/family identify possible irrational spiritual/cultural beliefs and values  - Explore possibilities of making amends & reconciliation with self, others, and/or a greater power  - Guide patient/family in identifying painful feelings of guilt  - Help patient explore and identify spiritual beliefs, cultural understandings or values that may help or hinder letting go of issue  - Help patient explore feelings of anger, bitterness, resentment  - Help patient/family identify and examine  the situation in which these feelings are experienced  - Help patient/family identify destructive displacement of feelings onto other individuals  - Invite use of sacraments/rituals/ceremonies as appropriate (e.g. - confession, anointing, smudging)  -. Refer patient to formal counseling and/or to [place] community for further support work  Outcome: Progressing  Goal: Pt feels balance and connection with higher power that empowers the self during times of loss, guilt and fear  Description: INTERVENTIONS:  - Create safety for patient through empathic presence and non-judgmental listening  - Encourage patient to explore his/her values, beliefs and/or spiritual images and practices  - Encourage use of breath work, imagery, meditation, relaxation, reiki to ease distress and provide healing  - Encourage use of cultural and spiritual celebrations and rituals  - Facilitate discussion that helps patient sort out spiritual concerns  - Help patient identify where meaning/hope/comfort & strength are in his/her life  - Refer patient to [place] community for assistance, as appropriate  - Respond to patient/family need for prayer/ritual/sacrament/ceremony  Outcome: Progressing

## 2024-09-16 NOTE — PLAN OF CARE
Patient is minimally responsive; groaned when I was attempting to lift arm at beginning of shift- IVP 1 mg of dilaudid was given to relieve pain 1x. Continuous dilaudid drip of 0.7 mg/hr. Has periods of irregular/shallow breathing. She is on 2.5L NC. Sacral dressing was changed. Safety precautions in place.  Problem: PAIN - ADULT  Goal: Verbalizes/displays adequate comfort level or patient's stated pain goal  Description: INTERVENTIONS:  - Encourage pt to monitor pain and request assistance  - Assess pain using appropriate pain scale  - Administer analgesics based on type and severity of pain and evaluate response  - Implement non-pharmacological measures as appropriate and evaluate response  - Consider cultural and social influences on pain and pain management  - Manage/alleviate anxiety  - Utilize distraction and/or relaxation techniques  - Monitor for opioid side effects  - Notify MD/LIP if interventions unsuccessful or patient reports new pain  - Anticipate increased pain with activity and pre-medicate as appropriate  Outcome: Progressing     Problem: COPING  Goal: Pt/Family able to verbalize concerns and demonstrate effective coping strategies  Description: INTERVENTIONS:  - Assist patient/family to identify coping skills, available support systems and cultural and spiritual values  - Provide emotional support, including active listening and acknowledgement of concerns of patient and caregivers  - Reduce environmental stimuli, as able  - Instruct patient/family in relaxation techniques, as appropriate  - Assess for spiritual and psychosocial needs and initiate Spiritual Care or Behavioral Health consult as needed  Outcome: Progressing     Problem: DEATH & DYING  Goal: Pt/Family communicate acceptance of impending death and feel psychological comfort and peace  Description: INTERVENTIONS:  - Assess patient/family anxiety and grief process related to end of life issues  - Provide emotional and spiritual  support  - Provide information about the patient’s health status with consideration of family and cultural values  - Communicate willingness to discuss death and facilitate grief process  with patient/family as appropriate  - Emphasize sustaining relationships within family system and community, or [place]/spiritual traditions  - Initiate Spiritual Care consult as needed  Outcome: Progressing     Problem: CONFUSION  Goal: Confusion, delirium, dementia or psychosis is improved or at baseline  Description: INTERVENTIONS:  - Assess for possible contributors to thought disturbance, including medications, impaired vision or hearing, underlying metabolic abnormalities, dehydration, psychiatric diagnoses, and notify attending MD/LIP  - [place] high risk fall precautions, as indicated  - Provide frequent short contacts to provide reality reorientation, refocusing and direction  - Decrease environmental stimuli, including noise as appropriate  - Monitor and intervene to maintain adequate nutrition, hydration, elimination, sleep and activity  - Consider the need for a sitter if unable to leave patient unattended  - Initiate Spiritual Care consult as indicated  - Consult Pharmacy as needed  Outcome: Progressing     Problem: SPIRITUAL CARE  Goal: Pt/Family able to move forward in process of forgiving self, others and/or higher power  Description: INTERVENTIONS:  - Assist patient to overcome blocks to healing by use of spiritual practices (prayer, meditation, guided imagery, reiki, breath work, etc.)  - De-myth guilt and help patient/family identify possible irrational spiritual/cultural beliefs and values  - Explore possibilities of making amends & reconciliation with self, others, and/or a greater power  - Guide patient/family in identifying painful feelings of guilt  - Help patient explore and identify spiritual beliefs, cultural understandings or values that may help or hinder letting go of issue  - Help patient explore  feelings of anger, bitterness, resentment  - Help patient/family identify and examine the situation in which these feelings are experienced  - Help patient/family identify destructive displacement of feelings onto other individuals  - Invite use of sacraments/rituals/ceremonies as appropriate (e.g. - confession, anointing, smudging)  -. Refer patient to formal counseling and/or to [place] community for further support work  Outcome: Progressing  Goal: Pt feels balance and connection with higher power that empowers the self during times of loss, guilt and fear  Description: INTERVENTIONS:  - Create safety for patient through empathic presence and non-judgmental listening  - Encourage patient to explore his/her values, beliefs and/or spiritual images and practices  - Encourage use of breath work, imagery, meditation, relaxation, reiki to ease distress and provide healing  - Encourage use of cultural and spiritual celebrations and rituals  - Facilitate discussion that helps patient sort out spiritual concerns  - Help patient identify where meaning/hope/comfort & strength are in his/her life  - Refer patient to [place] community for assistance, as appropriate  - Respond to patient/family need for prayer/ritual/sacrament/ceremony  Outcome: Progressing

## 2024-09-17 NOTE — HOSPICE RN NOTE
[facility] Inpatient Nursing Rounds:     Patient lying in bed, minimally responsive. Lungs are coarse on 2.5 L NC and audibly congested. Hooks draining yellow urine. Lasix IV x 2, Robinul IV x 2 given in the last 24 hours. Pitting edema +2, noted to bilateral upper and lower extremities.     Dilaudid drip at 0.8 mg/hr, increased from 0.7 mg/hr for dyspnea. No visitors at bedside currently.       PPS: 10%    Patient remains eligible for general inpatient hospice care for symptom management of dyspnea and pain requiring frequent nursing assessments and interventions including titration of IV medications. POC discussed with Maria C GIL at bedside. Agreed to order Atropine drops to help with congestion. [facility] will continue to support the patient and family.         Cici Ceron RN BSN  [facility] RN Liaison  [phone] (After-hours)

## 2024-09-17 NOTE — PROGRESS NOTES
[facility]  part of [facility]    Progress Note    Margaret Silverio Patient Status:  Inpatient    3/14/1945 MRN [id number]   Location [facility] 4W/SW/SE Attending Michelle Buck MD   Hosp Day # 7 PCP Johnathon Rodriguez DO     Chief complaint: failure to thrive  Subjective:     Looks comfortable, decr responsiveness, staring in space, increased edema  no fevers       Objective:   Blood pressure 103/57, pulse 65, temperature 97.9 °F (36.6 °C), temperature source Axillary, resp. rate 14, SpO2 93%.    GEN: looks ill, edematous  HEENT: eyes open  Pulmonary:  course and diminished to auscultation bilaterally  Cardiovascular: Loud heart murmur  Extremities: +edema  Pulses: palpable   Psychiatric: calm    Assessment and Plan:     Sepsis  Failure to thrive  Infected sacral decubitus ulcer  Acute metabolic encephalopathy  GEORGE/cardiorenal  Severe Nonischmeic CM and Severe Pulm HTN  None anion gap metabolic acidosis  Tachypnea  Remote history of seizures  Sepsis, present on admission, now resolved  Hyponatremia, resolved  UTI  Nonischemic cardiomyopathy  Chronic systolic congestive heart failure  Paroxysmal atrial fibrillation  CKD stage III  Thrombocytosis     Dispo: continue comfort care    D/w RN      Results:     Lab Results   Component Value Date    WBC 9.5 09/10/2024    HGB 8.2 (L) 09/10/2024    HCT 26.1 (L) 09/10/2024    .0 09/10/2024    CREATSERUM 1.39 (H) 09/10/2024    BUN 30 (H) 09/10/2024     09/10/2024    K 3.9 09/10/2024     09/10/2024    CO2 21.0 09/10/2024    GLU 95 09/10/2024    CA 8.9 09/10/2024    ALB 2.7 (L) 09/10/2024    ALKPHO 198 (H) 2024    BILT 0.7 2024    TP 5.5 (L) 2024     (H) 2024    ALT 79 (H) 2024    PTT 39.2 (H) 2024    INR 1.72 (H) 2024    T4F 1.1 2024    TSH 6.866 (H) 2024    LIP 32 2024    ESRML 40 (H) 2024    CRP 9.00 (H) 2024    MG 2.1 2024    PHOS  4.8 09/10/2024    B12 >2,000 (H) 07/01/2024       No results found.          CEE CHIU MD  9/17/2024

## 2024-09-17 NOTE — PLAN OF CARE
Patient is minimally responsive. BP low. IV lasix and robinul PRN for work of breathing. PCA for pain. Purewick in place; low urine output. No BM overnight. Mouth suctioned as needed for secretions. Frequent rounding. Family at the bedside during rounds. Call light within reach.   Problem: COPING  Goal: Pt/Family able to verbalize concerns and demonstrate effective coping strategies  Description: INTERVENTIONS:  - Assist patient/family to identify coping skills, available support systems and cultural and spiritual values  - Provide emotional support, including active listening and acknowledgement of concerns of patient and caregivers  - Reduce environmental stimuli, as able  - Instruct patient/family in relaxation techniques, as appropriate  - Assess for spiritual and psychosocial needs and initiate Spiritual Care or Behavioral Health consult as needed  Outcome: Progressing     Problem: DEATH & DYING  Goal: Pt/Family communicate acceptance of impending death and feel psychological comfort and peace  Description: INTERVENTIONS:  - Assess patient/family anxiety and grief process related to end of life issues  - Provide emotional and spiritual support  - Provide information about the patient’s health status with consideration of family and cultural values  - Communicate willingness to discuss death and facilitate grief process  with patient/family as appropriate  - Emphasize sustaining relationships within family system and community, or mare[place]/spiritual traditions  - Initiate Spiritual Care consult as needed  Outcome: Progressing     Problem: CONFUSION  Goal: Confusion, delirium, dementia or psychosis is improved or at baseline  Description: INTERVENTIONS:  - Assess for possible contributors to thought disturbance, including medications, impaired vision or hearing, underlying metabolic abnormalities, dehydration, psychiatric diagnoses, and notify attending MD/LIP  - [place] high risk fall precautions, as indicated  -  Provide frequent short contacts to provide reality reorientation, refocusing and direction  - Decrease environmental stimuli, including noise as appropriate  - Monitor and intervene to maintain adequate nutrition, hydration, elimination, sleep and activity  - Consider the need for a sitter if unable to leave patient unattended  - Initiate Spiritual Care consult as indicated  - Consult Pharmacy as needed  Outcome: Progressing

## 2024-09-17 NOTE — PLAN OF CARE
Patient is minimally responsive; noticeably more lethargic today. Coarse breath sounds due to increased secretions; suctioned as needed with some relief. Lasix & Robinul IVP were given and effective. Atropine drops were ordered to help with the secretions. Safety precautions in place, family at bedside.   Problem: PAIN - ADULT  Goal: Verbalizes/displays adequate comfort level or patient's stated pain goal  Description: INTERVENTIONS:  - Encourage pt to monitor pain and request assistance  - Assess pain using appropriate pain scale  - Administer analgesics based on type and severity of pain and evaluate response  - Implement non-pharmacological measures as appropriate and evaluate response  - Consider cultural and social influences on pain and pain management  - Manage/alleviate anxiety  - Utilize distraction and/or relaxation techniques  - Monitor for opioid side effects  - Notify MD/LIP if interventions unsuccessful or patient reports new pain  - Anticipate increased pain with activity and pre-medicate as appropriate  Outcome: Progressing     Problem: COPING  Goal: Pt/Family able to verbalize concerns and demonstrate effective coping strategies  Description: INTERVENTIONS:  - Assist patient/family to identify coping skills, available support systems and cultural and spiritual values  - Provide emotional support, including active listening and acknowledgement of concerns of patient and caregivers  - Reduce environmental stimuli, as able  - Instruct patient/family in relaxation techniques, as appropriate  - Assess for spiritual and psychosocial needs and initiate Spiritual Care or Behavioral Health consult as needed  Outcome: Progressing     Problem: DEATH & DYING  Goal: Pt/Family communicate acceptance of impending death and feel psychological comfort and peace  Description: INTERVENTIONS:  - Assess patient/family anxiety and grief process related to end of life issues  - Provide emotional and spiritual support  -  Provide information about the patient’s health status with consideration of family and cultural values  - Communicate willingness to discuss death and facilitate grief process  with patient/family as appropriate  - Emphasize sustaining relationships within family system and community, or [place]/spiritual traditions  - Initiate Spiritual Care consult as needed  Outcome: Progressing     Problem: CONFUSION  Goal: Confusion, delirium, dementia or psychosis is improved or at baseline  Description: INTERVENTIONS:  - Assess for possible contributors to thought disturbance, including medications, impaired vision or hearing, underlying metabolic abnormalities, dehydration, psychiatric diagnoses, and notify attending MD/LIP  - [place] high risk fall precautions, as indicated  - Provide frequent short contacts to provide reality reorientation, refocusing and direction  - Decrease environmental stimuli, including noise as appropriate  - Monitor and intervene to maintain adequate nutrition, hydration, elimination, sleep and activity  - Consider the need for a sitter if unable to leave patient unattended  - Initiate Spiritual Care consult as indicated  - Consult Pharmacy as needed  Outcome: Progressing     Problem: SPIRITUAL CARE  Goal: Pt/Family able to move forward in process of forgiving self, others and/or higher power  Description: INTERVENTIONS:  - Assist patient to overcome blocks to healing by use of spiritual practices (prayer, meditation, guided imagery, reiki, breath work, etc.)  - De-myth guilt and help patient/family identify possible irrational spiritual/cultural beliefs and values  - Explore possibilities of making amends & reconciliation with self, others, and/or a greater power  - Guide patient/family in identifying painful feelings of guilt  - Help patient explore and identify spiritual beliefs, cultural understandings or values that may help or hinder letting go of issue  - Help patient explore feelings of  anger, bitterness, resentment  - Help patient/family identify and examine the situation in which these feelings are experienced  - Help patient/family identify destructive displacement of feelings onto other individuals  - Invite use of sacraments/rituals/ceremonies as appropriate (e.g. - confession, anointing, smudging)  -. Refer patient to formal counseling and/or to marek community for further support work  Outcome: Progressing  Goal: Pt feels balance and connection with higher power that empowers the self during times of loss, guilt and fear  Description: INTERVENTIONS:  - Create safety for patient through empathic presence and non-judgmental listening  - Encourage patient to explore his/her values, beliefs and/or spiritual images and practices  - Encourage use of breath work, imagery, meditation, relaxation, reiki to ease distress and provide healing  - Encourage use of cultural and spiritual celebrations and rituals  - Facilitate discussion that helps patient sort out spiritual concerns  - Help patient identify where meaning/hope/comfort & strength are in his/her life  - Refer patient to [place] community for assistance, as appropriate  - Respond to patient/family need for prayer/ritual/sacrament/ceremony  Outcome: Progressing

## 2024-09-17 NOTE — CM/SW NOTE
LSW visit to pt with liaminor Perera. Pt was lying in bed with eyes closed and appeared lethargic. Pt's dtr Barbi and pt's sister were at bedside. LSW provided emotional support through active listening. LSW will continue to follow up with pt and family to offer comfort and support.      Savita Hung, MARY  [facility]  [id number]

## 2024-09-18 NOTE — PLAN OF CARE
Patient is DNAR/comfort care.  Family requests that we do not use the words hospice with patient or anyone else present.  Patient is minimally responsive, has nasal cannula 3.5 , rivero in place, patient has Robanol for secretions, and suctioning as needed.  Patient has dilaudid drip for comfort.  Patient has sacral ulcer dressing Q shift or as needed.  Patient family was bedside.  Patient has call light and safety measures in place.  Problem: PAIN - ADULT  Goal: Verbalizes/displays adequate comfort level or patient's stated pain goal  Description: INTERVENTIONS:  - Encourage pt to monitor pain and request assistance  - Assess pain using appropriate pain scale  - Administer analgesics based on type and severity of pain and evaluate response  - Implement non-pharmacological measures as appropriate and evaluate response  - Consider cultural and social influences on pain and pain management  - Manage/alleviate anxiety  - Utilize distraction and/or relaxation techniques  - Monitor for opioid side effects  - Notify MD/LIP if interventions unsuccessful or patient reports new pain  - Anticipate increased pain with activity and pre-medicate as appropriate  Outcome: Progressing     Problem: COPING  Goal: Pt/Family able to verbalize concerns and demonstrate effective coping strategies  Description: INTERVENTIONS:  - Assist patient/family to identify coping skills, available support systems and cultural and spiritual values  - Provide emotional support, including active listening and acknowledgement of concerns of patient and caregivers  - Reduce environmental stimuli, as able  - Instruct patient/family in relaxation techniques, as appropriate  - Assess for spiritual and psychosocial needs and initiate Spiritual Care or Behavioral Health consult as needed  Outcome: Progressing     Problem: DEATH & DYING  Goal: Pt/Family communicate acceptance of impending death and feel psychological comfort and peace  Description:  INTERVENTIONS:  - Assess patient/family anxiety and grief process related to end of life issues  - Provide emotional and spiritual support  - Provide information about the patient’s health status with consideration of family and cultural values  - Communicate willingness to discuss death and facilitate grief process  with patient/family as appropriate  - Emphasize sustaining relationships within family system and community, or mare[place]/spiritual traditions  - Initiate Spiritual Care consult as needed  Outcome: Progressing     Problem: CONFUSION  Goal: Confusion, delirium, dementia or psychosis is improved or at baseline  Description: INTERVENTIONS:  - Assess for possible contributors to thought disturbance, including medications, impaired vision or hearing, underlying metabolic abnormalities, dehydration, psychiatric diagnoses, and notify attending MD/LIP  - [place] high risk fall precautions, as indicated  - Provide frequent short contacts to provide reality reorientation, refocusing and direction  - Decrease environmental stimuli, including noise as appropriate  - Monitor and intervene to maintain adequate nutrition, hydration, elimination, sleep and activity  - Consider the need for a sitter if unable to leave patient unattended  - Initiate Spiritual Care consult as indicated  - Consult Pharmacy as needed  Outcome: Progressing     Problem: SPIRITUAL CARE  Goal: Pt/Family able to move forward in process of forgiving self, others and/or higher power  Description: INTERVENTIONS:  - Assist patient to overcome blocks to healing by use of spiritual practices (prayer, meditation, guided imagery, reiki, breath work, etc.)  - De-myth guilt and help patient/family identify possible irrational spiritual/cultural beliefs and values  - Explore possibilities of making amends & reconciliation with self, others, and/or a greater power  - Guide patient/family in identifying painful feelings of guilt  - Help patient explore and  identify spiritual beliefs, cultural understandings or values that may help or hinder letting go of issue  - Help patient explore feelings of anger, bitterness, resentment  - Help patient/family identify and examine the situation in which these feelings are experienced  - Help patient/family identify destructive displacement of feelings onto other individuals  - Invite use of sacraments/rituals/ceremonies as appropriate (e.g. - confession, anointing, smudging)  -. Refer patient to formal counseling and/or to marek [facility] for further support work  Outcome: Progressing  Goal: Pt feels balance and connection with higher power that empowers the self during times of loss, guilt and fear  Description: INTERVENTIONS:  - Create safety for patient through empathic presence and non-judgmental listening  - Encourage patient to explore his/her values, beliefs and/or spiritual images and practices  - Encourage use of breath work, imagery, meditation, relaxation, reiki to ease distress and provide healing  - Encourage use of cultural and spiritual celebrations and rituals  - Facilitate discussion that helps patient sort out spiritual concerns  - Help patient identify where meaning/hope/comfort & strength are in his/her life  - Refer patient to marek [facility] for assistance, as appropriate  - Respond to patient/family need for prayer/ritual/sacrament/ceremony  Outcome: Progressing

## 2024-09-18 NOTE — HOSPICE RN NOTE
[facility] RN notified by JEFFERY Osborne of patient's natural death. Reported TOD . Hospice RN visited with family at bedside to offer condolences and bereavement resources/services. Family stated understanding. [facility] notified via telephone @ # [phone]. [facility] team is available for family support and further resource education.    Jessie Garner RN, BSN  Transitional Nurse Liaison  [facility]  [phone] (After-hours)

## 2024-09-19 NOTE — DISCHARGE SUMMARY
[facility]  part of [facility]     DISCHARGE SUMMARY     Margaret Silverio Patient Status:  Inpatient    3/14/1945 MRN [id number]   Location [facility] 4W/SW/SE Attending No att. providers found   Hosp Day # 8 PCP Johnathon Rodriguez DO     DATE OF ADMISSION: 9/10/2024  DATE OF DISCHARGE: 2024   DISPOSITION:     DISCHARGE DIAGNOSES:  Sepsis  Failure to thrive  Infected sacral decubitus ulcer  Acute metabolic encephalopathy  GEORGE/cardiorenal  Severe Nonischmeic CM and Severe Pulm HTN  None anion gap metabolic acidosis  Tachypnea  Remote history of seizures  Sepsis, present on admission, now resolved  Hyponatremia, resolved  UTI  Nonischemic cardiomyopathy  Chronic systolic congestive heart failure  Paroxysmal atrial fibrillation  CKD stage III  Thrombocytosis    HISTORY OF PRESENT ILLNESS (COPIED FROM ADMISSION H&P)  Margaret Silverio is a(n) 79 year old female  who initially presented to the emergency department on 24 bedbound secondary to severe debilitating rheumatoid arthritis, underlying nonischemic cardiomyopathy. She was hospitalized just recently and discharged after a heart failure episode and treated with diuretics and dobutamine. She had right and left heart catheterization. Also, she had pleurocentesis, 1 L removed. Fluid did not show evidence of infection. She was restarted on methotrexate and prednisone for her severe rheumatoid arthritis. This time she also c/o painful decubitus ulcer, which the patient said started developing while she was in the hospital last time. CBC showed white blood cell count of 1.3, hemoglobin 7.4, and platelet count 103. Urinalysis showed evidence of urinary tract infection. Chemistry showed GFR of 18, which is at baseline; BUN and creatinine of 59 and 2.69. CT scan of the abdomen showed bladder mural wall thickening suggestive of cystitis, small to moderate right and moderate left loculated pleural effusion, chronic  pancreatitis.   She had a prolonged hospitalization (-9/10/2024). Treated for sepsis presumably due to infected decubitus ulcer. Ultimately underwent debridement in the OR. Had a waxing and waning course especially with regards to mental status. She again became septic and antibiotics were expanded, however ultimately patient continued to decline, not taking oral intake very well. Seen by palliative care and after numerous discussions on end-of-life issues, and goals of care family/POA daughter elected for inpatient hospice with focus on comfort.     HOSPITAL COURSE:  Patient was admitted placed on comfort care measures.  Support given to family throughout.  She  on 2024.  I was not present.

## 2024-09-19 NOTE — SPIRITUAL CARE NOTE
Spiritual Care Visit Note    Patient Name: Margaret Silverio Date of Spiritual Care Visit: 24   : 3/14/1945 Primary Dx: <principal problem not specified>       Referred By: Referral From: Hospice;Nurse;    Spiritual Care Taxonomy:    Intended Effects: Journeying with someone in the grief process    Methods: Encourage end of life review    Interventions: Acknowledge current situation;Ask guided questions;Share words of hope and inspiration    Visit Type/Summary:     - Spiritual Care: Responded to a request via the on call phone Patient declined a  visit at this time.    Spiritual Care support can be requested via an Epic consult. For urgent/immediate needs, please contact the On Call  at: [place]: ext [phone]        Chaplain Espinoza

## 2024-09-19 NOTE — PLAN OF CARE
Pt  at [facility]. Resusitation not attempted as pt was DNR.       Time of Death: 18:11    Family Notified: yes    MD: yes    [facility] Notified: yes     contacted if applicable:

## 2024-09-25 ENCOUNTER — TELEPHONE (OUTPATIENT)
Dept: FAMILY MEDICINE CLINIC | Facility: CLINIC | Age: 79
End: 2024-09-25

## 2024-09-25 NOTE — TELEPHONE ENCOUNTER
Patients daughter called to inform  that the patient passed away on 09/18/2024.    Per daughter the patients  Low Silverio has an appointment tomorrow with  and she asked that this is NOT brought up at the time of his appointment.

## (undated) DEVICE — SOLUTION IRRIG 1000ML 0.9% NACL USP BTL

## (undated) DEVICE — YANKAUER SUCTION INSTRUMENT NO CONTROL VENT, BULB TIP, CLEAR: Brand: YANKAUER

## (undated) DEVICE — MEDI-VAC NON-CONDUCTIVE SUCTION TUBING: Brand: CARDINAL HEALTH

## (undated) DEVICE — KIT CLEAN ENDOKIT 1.1OZ GOWNX2

## (undated) DEVICE — CONMED SCOPE SAVER BITE BLOCK, 20X27 MM: Brand: SCOPE SAVER

## (undated) DEVICE — MEDI-VAC NON-CONDUCTIVE SUCTION TUBING 6MM X 1.8M (6FT.) L: Brand: CARDINAL HEALTH

## (undated) DEVICE — PAD PREP 24X43.5IN W/ PCH

## (undated) DEVICE — KIT ENDO ORCAPOD 160/180/190

## (undated) DEVICE — UNDERPANTS MAT 2XL FOR 24-64IN WAIST PUR

## (undated) DEVICE — FIAPC® PROBE W/ FILTER 2200 A OD 2.3MM/6.9FR; L 2.2M/7.2FT: Brand: ERBE

## (undated) DEVICE — JELLY,LUBE,STERILE,FLIP TOP,TUBE,2-OZ: Brand: MEDLINE

## (undated) DEVICE — 60 ML SYRINGE REGULAR TIP: Brand: MONOJECT

## (undated) DEVICE — Device

## (undated) DEVICE — SUT CHRM GUT 2-0 27IN UR-6 ABSRB UD 26MM 5/8

## (undated) DEVICE — CLIP RESOLUTION 235CM

## (undated) DEVICE — Device: Brand: DUAL NARE NASAL CANNULAE FEMALE LUER CON 7FT O2 TUBE

## (undated) DEVICE — SKIN PREP TRAY 4 COMPARTM TRAY: Brand: MEDLINE INDUSTRIES, INC.

## (undated) DEVICE — MINOR GENERAL: Brand: MEDLINE INDUSTRIES, INC.

## (undated) DEVICE — YANKAUER,BULB TIP,W/O VENT,RIGID,STERILE: Brand: MEDLINE

## (undated) DEVICE — REM POLYHESIVE ADULT PATIENT RETURN ELECTRODE: Brand: VALLEYLAB

## (undated) DEVICE — SUT CHRM GUT 2-0 27IN SH ABSRB UD 26MM 1/2

## (undated) NOTE — LETTER
[facility]  Administration of Anesthesia  I, Margaret Silverio agree to be cared for by a physician anesthesiologist alone and/or with a nurse anesthetist, who is specially trained to monitor me and give me medicine to put me to sleep or keep me comfortable during my procedure    I understand that my anesthesiologist and/or anesthetist is not an employee or agent of [facility] or [facility]. He or she works for [facility]    As the patient asking for anesthesia services, I agree to:  Allow the anesthesiologist (anesthesia doctor) to give me medicine and do additional procedures as necessary. Some examples are: Starting or using an “IV” to give me medicine, fluids or blood during my procedure, and having a breathing tube placed to help me breathe when I’m asleep (intubation). In the event that my heart stops working properly, I understand that my anesthesiologist will make every effort to sustain my life, unless otherwise directed by [facility] Do Not Resuscitate documents.  Tell my anesthesia doctor before my procedure:  If I am pregnant.  The last time that I ate or drank.  iii. All of the medicines I take (including prescriptions, herbal supplements, and pills I can buy without a prescription (including street drugs/illegal medications). Failure to inform my anesthesiologist about these medicines may increase my risk of anesthetic complications.  iv.If I am allergic to anything or have had a reaction to anesthesia before.  I understand how the anesthesia medicine will help me (benefits).  I understand that with any type of anesthesia medicine there are risks:  The most common risks are: nausea, vomiting, sore throat, muscle soreness, damage to my eyes, mouth, or teeth (from breathing tube placement).  Rare risks include: remembering what happened during my procedure, allergic reactions to medications, injury to my airway, heart, lungs, vision, nerves, or  muscles and in extremely rare instances death.  My doctor has explained to me other choices available to me for my care (alternatives).  Pregnant Patients (“epidural”):  I understand that the risks of having an epidural (medicine given into my back to help control pain during labor), include itching, low blood pressure, difficulty urinating, headache or slowing of the baby’s heart. Very rare risks include infection, bleeding, seizure, irregular heart rhythms and nerve injury.  Regional Anesthesia (“spinal”, “epidural”, & “nerve blocks”):  I understand that rare but potential complications include headache, bleeding, infection, seizure, irregular heart rhythms, and nerve injury.    _____________________________________________________________________________  Patient (or Representative) Signature/Relationship to Patient  Date   Time    _____________________________________________________________________________   Name (if used)    Language/Organization   Time    _____________________________________________________________________________  Nurse Anesthetist Signature     Date   Time  _____________________________________________________________________________  Anesthesiologist Signature     Date   Time  I have discussed the procedure and information above with the patient (or patient’s representative) and answered their questions. The patient or their representative has agreed to have anesthesia services.    _____________________________________________________________________________  Witness        Date   Time  I have verified that the signature is that of the patient or patient’s representative, and that it was signed before the procedure  Patient Name: Margaret Silverio     : 3/14/1945                 Printed: 2024 at 8:01 AM    Medical Record #: [id number]                                            Page 1 of 1  ----------ANESTHESIA CONSENT----------

## (undated) NOTE — [place] [address]  Authorization for Invasive Procedures  Date: ***           Time: ***    I hereby authorize ***, my physician and his/her assistants (if applicable), which may include medical students, residents, and/or fellows, to perform the following surgical operation/ procedure and administer such anesthesia as may be determined necessary by my physician:  Operation/Procedure name (s)  Cardiac Catheterization, Left Ventricular Cineangiography, Bilateral Selective Coronary Angiography and/or Right Heart Catheterization; possible Percutaneous Transluminal Coronary Angioplasty, Coronary Atherectomy, Coronary Stent, Intracoronary Thrombolytic therapy, Antiplatelet therapy and/or Intravascular Ultrasound on Margaret Silverio   2.   I recognize that during the surgical operation/procedure, unforeseen conditions may necessitate additional or different procedures than those listed above.  I, therefore, further authorize and request that the above-named surgeon, assistants, or designees perform such procedures as are, in their judgment, necessary and desirable.    3.   My surgeon/physician has discussed prior to my surgery the potential benefits, risks and side effects of this procedure; the likelihood of achieving goals; and potential problems that might occur during recuperation.  They also discussed reasonable alternatives to the procedure, including risks, benefits, and side effects related to the alternatives and risks related to not receiving this procedure.  I have had all my questions answered and I acknowledge that no guarantee has been made as to the result that may be obtained.    4.   Should the need arise during my operation/procedure, which includes change of level of care prior to discharge, I also consent to the administration of blood and/or blood products.  Further, I understand that despite careful testing and screening of blood or blood products by collecting  agencies, I may still be subject to ill effects as a result of receiving a blood transfusion and/or blood products.  The following are some, but not all, of the potential risks that can occur: fever and allergic reactions, hemolytic reactions, transmission of diseases such as Hepatitis, AIDS and Cytomegalovirus (CMV) and fluid overload.  In the event that I wish to have an autologous transfusion of my own blood, or a directed donor transfusion, I will discuss this with my physician.  Check only if Refusing Blood or Blood Products  I understand refusal of blood or blood products as deemed necessary by my physician may have serious consequences to my condition to include possible death. I hereby assume responsibility for my refusal and release the hospital, its personnel, and my physicians from any responsibility for the consequences of my refusal.          o  Refuse      5.   I authorize the use of any specimen, organs, tissues, body parts or foreign objects that may be removed from my body during the operation/procedure for diagnosis, research or teaching purposes and their subsequent disposal by hospital authorities.  I also authorize the release of specimen test results and/or written reports to my treating physician on the hospital medical staff or other referring or consulting physicians involved in my care, at the discretion of the Pathologist or my treating physician.    6.   I consent to the photographing or videotaping of the operations or procedures to be performed, including appropriate portions of my body for medical, scientific, or educational purposes, provided my identity is not revealed by the pictures or by descriptive texts accompanying them.  If the procedure has been photographed/videotaped, the surgeon will obtain the original picture, image, videotape or CD.  The hospital will not be responsible for storage, release or maintenance of the picture, image, tape or CD.    7.   I consent to the  presence of a  or observers in the operating room as deemed necessary by my physician or their designees.    8.   I recognize that in the event my procedure results in extended X-Ray/fluoroscopy time, I may develop a skin reaction.    9. If I have a Do Not Attempt Resuscitation (DNAR) order in place, that status will be suspended while in the operating room, procedural suite, and during the recovery period unless otherwise explicitly stated by me (or a person authorized to consent on my behalf). The surgeon or my attending physician will determine when the applicable recovery period ends for purposes of reinstating the DNAR order.  10. Patients having a sterilization procedure: I understand that if the procedure is successful the results will be permanent and it will therefore be impossible for me to inseminate, conceive, or bear children.  I also understand that the procedure is intended to result in sterility, although the result has not been guaranteed.   11. I acknowledge that my physician has explained sedation/analgesia administration to me including the risk and benefits I consent to the administration of sedation/analgesia as may be necessary or desirable in the judgment of my physician.    I CERTIFY THAT I HAVE READ AND FULLY UNDERSTAND THE ABOVE CONSENT TO OPERATION and/or OTHER PROCEDURE.        ____________________________________       _________________________________      ______________________________  Signature of Patient         Signature of Responsible Person        Printed Name of Responsible Person    ____________________________________      _________________________________      ______________________________       Signature of Witness          Relationship to Patient                       Date                                       Time  Patient Name: Margaret Fuller Jean Claude  : 3/14/1945    Reviewed: 2024   Printed: 2024  Medical Record #: [id number] Page 1 of 2            STATEMENT OF PHYSICIAN My signature below affirms that prior to the time of the procedure; I have explained to the patient and/or his/her legal representative, the risks and benefits involved in the proposed treatment and any reasonable alternative to the proposed treatment. I have also explained the risks and benefits involved in refusal of the proposed treatment and alternatives to the proposed treatment and have answered the patient's questions. If I have a significant financial interest in a co-management agreement or a significant financial interest in any product or implant, or other significant relationship used in this procedure/surgery, I have disclosed this and had a discussion with my patient.     _______________________________________________________________ _____________________________  (Signature of Physician)                                                                                         (Date)                                   (Time)  Patient Name: Margaret Warrenler  : 3/14/1945    Reviewed: 2024   Printed: 2024  Medical Record #: [id number] Page 2 of 2

## (undated) NOTE — Clinical Note
Please schedule patient at the [facility] once approved. Please follow-up with cardiology for anticoagulation medication.

## (undated) NOTE — IP AVS SNAPSHOT
Patient Demographics     Address  [address] [facility] [address] Phone  [phone] (Home) *Preferred*  [phone] (Work)  [phone] (Mobile) E-mail Address  [email or web address]      Patient Contacts     Name Relation Home Work Mobile    Low Silverio Spouse [phone]    Barbi Silverio Daughter   [phone]      Allergies as of 2/18/2024  Review status set to Review Complete on 2/3/2024       Noted Reaction Type Reactions    Lisinopril 06/10/2022   Systemic Coughing      Code Status Information     Code Status    DNAR/Selective Treatment        Patient Instructions       Residential palliative care will follow on discharge to rehab.      Follow-up Information     Luis Sargent MD Follow up in 1 week(s).    Specialties: Cardiac Electrophysiology, CARDIOLOGY  Why: Office will call you for follow up appt  Contact information:  [address] [place] [address]  [facility] [address]  [phone]             Johnathon Rodriguez, DO Follow up in 1 week(s).    Specialty: Family Medicine  Contact information:  [address] [place] [address]  [facility] [id number] [phone]                        Your Home Meds List      TAKE these medications       Instructions Authorizing Provider Morning Afternoon Evening As Needed   alendronate 70 MG Tabs  Commonly known as: Fosamax      Take 1 tablet (70 mg total) by mouth once a week.   Johnathon Rodriguez   [    ]   [    ]   [    ]   [    ]     amiodarone 200 MG Tabs  Commonly known as: Pacerone      Take 1 tablet (200 mg total) by mouth daily.    [    ]   [    ]   [    ]   [    ]     amitriptyline 25 MG Tabs  Commonly known as: Elavil      TAKE 1 TABLET(25 MG) BY MOUTH EVERY NIGHT   Felicitas Lee   [    ]   [    ]   [    ]   [    ]     bumetanide 1 MG Tabs  Commonly known as: Bumex      Take 1 tablet (1 mg total) by mouth BID (Diuretic).   Marissa Metcalf   [    ]   [    ]   [    ]   [    ]     carvedilol 3.125 MG Tabs  Commonly known as: Coreg      Take 1  tablet (3.125 mg total) by mouth 2 (two) times daily with meals.   Marissa Metcalf   [    ]   [    ]   [    ]   [    ]     ferrous sulfate 325 (65 FE) MG Tbec      Take 1 tablet (325 mg total) by mouth daily with breakfast.    [    ]   [    ]   [    ]   [    ]     folic acid 800 MCG Tabs  Commonly known as: FOLVITE      Take 1 tablet (800 mcg total) by mouth daily.   Johnathon Rodriguez   [    ]   [    ]   [    ]   [    ]     hydrALAZINE 25 MG Tabs  Commonly known as: Apresoline      Take 1 tablet (25 mg total) by mouth every 8 (eight) hours.   Marissa Metcalf   [    ]   [    ]   [    ]   [    ]     HYDROcodone-acetaminophen  MG Tabs  Commonly known as: Norco      Take 1 tablet by mouth every 6 (six) hours as needed for Pain.   Aparna Dyson   [    ]   [    ]   [    ]   [    ]     isosorbide dinitrate 10 MG Tabs  Commonly known as: Isordil      Take 1 tablet (10 mg total) by mouth TID (Nitrates).   Marissa Metcalf   [    ]   [    ]   [    ]   [    ]     methotrexate 2.5 MG Tabs  Commonly known as: Rheumatrex      TAKE 6 TABLETS BY MOUTH 1 TIME A WEEK   Johnathon Rodriguez   [    ]   [    ]   [    ]   [    ]     pantoprazole 40 MG Tbec  Commonly known as: Protonix      Take 1 tablet (40 mg total) by mouth 2 (two) times daily before meals.   Katiana Ochoa   [    ]   [    ]   [    ]   [    ]     sacubitril-valsartan 49-51 MG Tabs  Commonly known as: Entresto      Take 1 tablet by mouth 2 (two) times daily.   Marissa Metcalf   [    ]   [    ]   [    ]   [    ]     senna-docusate 8.6-50 MG Tabs  Commonly known as: Senokot-S      Take 2 tablets by mouth daily.   Aparna Dyson   [    ]   [    ]   [    ]   [    ]     spironolactone 25 MG Tabs  Commonly known as: Aldactone      Take 1 tablet (25 mg total) by mouth daily.   Marissa Metcalf   [    ]   [    ]   [    ]   [    ]           Where to Get Your Medications      These medications were sent to [facility] #[id number] [place] [address] - [address] AT [facility] OF  [address], [phone], [phone]  [address] , [facility] [address]    Phone: [phone]   folic acid 800 MCG Tabs     Please  your prescriptions at the location directed by your doctor or nurse    Bring a paper prescription for each of these medications  bumetanide 1 MG Tabs  carvedilol 3.125 MG Tabs  hydrALAZINE 25 MG Tabs  HYDROcodone-acetaminophen  MG Tabs  isosorbide dinitrate 10 MG Tabs  sacubitril-valsartan 49-51 MG Tabs  senna-docusate 8.6-50 MG Tabs  spironolactone 25 MG Tabs           [id number] - MAR ACTION REPORT  (last 48 hrs)    ** SITE UNKNOWN **     Order ID Medication Name Action Time Action Reason Comments    [id number] HYDROcodone-acetaminophen (Norco)  MG per tab 1 tablet 02/16/24 1836 Given      [id number] HYDROcodone-acetaminophen (Norco)  MG per tab 1 tablet 02/17/24 1140 Given      [id number] HYDROcodone-acetaminophen (Norco)  MG per tab 1 tablet 02/17/24 1819 Given      [id number] HYDROcodone-acetaminophen (Norco)  MG per tab 1 tablet 02/18/24 0046 Given      [id number] amiodarone (Pacerone) tab 200 mg 02/17/24 0955 Given      [id number] amiodarone (Pacerone) tab 200 mg 02/18/24 0821 Given      [id number] amitriptyline (Elavil) tab 25 mg 02/16/24 2107 Given      [id number] amitriptyline (Elavil) tab 25 mg 02/17/24 2224 Given      [id number] bumetanide (Bumex) tab 1 mg 02/17/24 0955 Given      [id number] bumetanide (Bumex) tab 1 mg 02/17/24 1754 Given      [id number] bumetanide (Bumex) tab 1 mg 02/18/24 0821 Given      [id number] carvedilol (Coreg) tab 3.125 mg 02/17/24 0955 Given      [id number] carvedilol (Coreg) tab 3.125 mg 02/17/24 1754 Given      [id number] carvedilol (Coreg) tab 3.125 mg 02/18/24 0820 Given      [id number] ferrous sulfate DR tab 325 mg 02/17/24 0955 Given      [id number] ferrous sulfate DR tab 325 mg 02/18/24 0821 Given      [id number] folic acid (Folvite) tab 800 mcg 02/17/24 0955 Given      [id number] folic acid (Folvite) tab 800 mcg  02/18/24 0821 Given      [id number] hydrALAZINE (Apresoline) tab 25 mg 02/16/24 1315 Given      [id number] hydrALAZINE (Apresoline) tab 25 mg 02/16/24 2107 Given      [id number] hydrALAZINE (Apresoline) tab 25 mg 02/17/24 0555 Given      [id number] hydrALAZINE (Apresoline) tab 25 mg 02/17/24 1521 Given      [id number] hydrALAZINE (Apresoline) tab 25 mg 02/17/24 2224 Given      [id number] hydrALAZINE (Apresoline) tab 25 mg 02/18/24 0555 Given      [id number] isosorbide dinitrate (Isordil) tab 10 mg 02/16/24 1315 Given      [id number] isosorbide dinitrate (Isordil) tab 10 mg 02/16/24 1836 Given      [id number] isosorbide dinitrate (Isordil) tab 10 mg 02/17/24 0955 Given      [id number] isosorbide dinitrate (Isordil) tab 10 mg 02/17/24 1521 Given      [id number] isosorbide dinitrate (Isordil) tab 10 mg 02/17/24 1754 Given      [id number] isosorbide dinitrate (Isordil) tab 10 mg 02/18/24 0821 Given      [id number] magnesium oxide (Mag-Ox) tab 400 mg 02/17/24 0955 Given      [id number] magnesium oxide (Mag-Ox) tab 400 mg 02/18/24 0820 Given      [id number] pantoprazole (Protonix) DR tab 40 mg 02/16/24 1836 Given      [id number] pantoprazole (Protonix) DR tab 40 mg 02/17/24 0555 Given      [id number] pantoprazole (Protonix) DR tab 40 mg 02/17/24 1754 Given      [id number] pantoprazole (Protonix) DR tab 40 mg 02/18/24 0555 Given      [id number] potassium chloride (K-Dur) tab 40 mEq 02/17/24 0954 Given      [id number] potassium chloride (K-Dur) tab 40 mEq 02/17/24 1521 Given      [id number] sacubitril-valsartan (Entresto) 49-51 MG per tab 1 tablet 02/16/24 2107 Given      [id number] sacubitril-valsartan (Entresto) 49-51 MG per tab 1 tablet 02/17/24 0955 Given      [id number] sacubitril-valsartan (Entresto) 49-51 MG per tab 1 tablet 02/17/24 2224 Given      [id number] sacubitril-valsartan (Entresto) 49-51 MG per tab 1 tablet 02/18/24 0820 Given      [id number] senna-docusate (Senokot-S) 8.6-50 MG per tab 2 tablet 02/17/24 0954 Given      [id number]  senna-docusate (Senokot-S) 8.6-50 MG per tab 2 tablet 02/18/24 0832 Given      [id number] spironolactone (Aldactone) tab 25 mg 02/16/24 1315 Given      [id number] spironolactone (Aldactone) tab 25 mg 02/17/24 0955 Given      [id number] spironolactone (Aldactone) tab 25 mg 02/18/24 0820 Given              Recent Vital Signs    Flowsheet Row Most Recent Value   /73 Filed at 02/18/2024 0809   Pulse 64 Filed at 02/18/2024 0809   Resp 18 Filed at 02/18/2024 0809   Temp 97.4 °F (36.3 °C) Filed at 02/18/2024 0809   SpO2 97 % Filed at 02/18/2024 0809      Patient's Most Recent Weight    Flowsheet Row Most Recent Value   Patient Weight 62.2 kg (137 lb 3.2 oz)      CPAP Settings (Inpatient)    Flowsheet Row Most Recent Value   Mode Spontaneous/Timed   Interface Full face mask   Mask size Medium   Set rate 10 breaths/min   Set IPAP 10   Set EPAP 5   O2% 30 %   I time 1         Lab Results Last 24 Hours      Magnesium [[id number]] (Normal)  Resulted: 02/18/24 0647, Result status: Final result   Ordering provider: Raiza Alvarez MD  02/17/24 2300 Resulting lab: [facility] ([facility])    Specimen Information    Type Source Collected On   Blood — 02/18/24 0555          Components    Component Value Reference Range Flag Lab   Magnesium 1.8 1.6 - 2.6 mg/dL — [facility])            Basic Metabolic Panel (8) [[id number]] (Abnormal)  Resulted: 02/18/24 0647, Result status: Final result   Ordering provider: Raiza Alvarez MD  02/17/24 2300 Resulting lab: [facility] ([facility])    Specimen Information    Type Source Collected On   Blood — 02/18/24 0555          Components    Component Value Reference Range Flag Lab   Glucose 85 70 - 99 mg/dL — [facility] ([facility])   Sodium 140 136 - 145 mmol/L — [facility])   Potassium 4.4 3.5 - 5.1 mmol/L — [facility] ([facility])   Chloride 103 98 - 112 mmol/L — [facility] ([facility])   CO2 33.0 21.0 - 32.0 mmol/L H [facility] ([facility])   Anion Gap 4  0 - 18 mmol/L — [facility])   BUN 22 9 - 23 mg/dL — [facility])   Creatinine 1.16 0.55 - 1.02 mg/dL H [facility])   BUN/CREA Ratio 19.0 10.0 - 20.0 — [facility])   Calcium, Total 9.3 8.7 - 10.4 mg/dL — [facility])   Calculated Osmolality 293 275 - 295 mOsm/kg — [facility] ([facility])   eGFR-Cr 49 >=60 mL/min/1.73m2 L [facility] ([facility])            CBC, Platelet; No Differential [[id number]] (Abnormal)  Resulted: 02/18/24 0611, Result status: Final result   Ordering provider: Raiza Alvarez MD  02/17/24 2300 Resulting lab: [facility] ([facility])    Specimen Information    Type Source Collected On   Blood — 02/18/24 0555          Components    Component Value Reference Range Flag Lab   WBC 6.3 4.0 - 11.0 x10(3) uL — [facility])   RBC 3.59 3.80 - 5.30 x10(6)uL L [facility])   HGB 10.9 12.0 - 16.0 g/dL L [facility])   HCT 32.8 35.0 - 48.0 % L [facility])   MCV 91.4 80.0 - 100.0 fL — [facility])   MCH 30.4 26.0 - 34.0 pg — [facility])   MCHC 33.2 31.0 - 37.0 g/dL — [facility])   RDW 21.0 11.0 - 15.0 % H [facility])   RDW-SD 63.7 35.1 - 46.3 fL H [facility])   .0 150.0 - 450.0 10(3)uL — [facility])            Potassium [[id number]] (Normal)  Resulted: 02/17/24 1831, Result status: Final result   Ordering provider: Raiza Alvarez MD  02/17/24 0742 Resulting lab: [facility] ([facility])    Specimen Information    Type Source Collected On   Blood — 02/17/24 1745          Components    Component Value Reference Range Flag Lab   Potassium 4.4 3.5 - 5.1 mmol/L — [facility] ([facility])            Testing Performed By     Lab - Abbreviation Name Director Address Valid Date Range    162 - [facility] ([facility]) [facility] ([facility]) Osiel Jiang [facility] [id number] 03/19/20 1442 - Present            Microbiology Results (All)      Procedure Component Value Units Date/Time    Blood Culture [[id number]] Collected: 02/10/24 1352    Order Status: Completed Lab Status: Final result Updated: 02/15/24 1501    Specimen: Blood,peripheral      Blood Culture Result No Growth 5 Days    Blood Culture [[id number]] Collected: 02/05/24 0707    Order Status: Completed Lab Status: Final result Updated: 02/10/24 0900    Specimen: Blood,peripheral      Blood Culture Result No Growth 5 Days    Blood Culture [[id number]] Collected: 02/05/24 0707    Order Status: Completed Lab Status: Final result Updated: 02/10/24 0900    Specimen: Blood,peripheral      Blood Culture Result No Growth 5 Days    SARS-CoV-2/Flu A and B/RSV by PCR (GeneXpert) [[id number]]  (Normal) Collected: 02/02/24 2112    Order Status: Completed Lab Status: Final result Updated: 02/02/24 2200    Specimen: Other from Nares      SARS-CoV-2 (COVID-19) - (GeneXpert) Not Detected     Influenza A by PCR Negative     Influenza B by PCR Negative     RSV by PCR Negative    Narrative:      This test is intended for the qualitative detection and differentiation of SARS-CoV-2, influenza A, influenza B, and respiratory syncytial virus (RSV) viral RNA in nasopharyngeal or nares swabs from individuals suspected of respiratory viral infection consistent with COVID-19 by their healthcare provider. Signs and symptoms of respiratory viral infection due to SARS-CoV-2, influenza, and RSV can be similar.    Test performed using the Xpert Xpress SARS-CoV-2/FLU/RSV (real time RT-PCR)  assay on the GeneXpert instrument, [place], [place] [address].   This test is being used under the Food and Drug Administration's Emergency Use Authorization.    The authorized Fact Sheet for Healthcare Providers for this assay is available upon request from the laboratory.         H&P - H&P Note      H&P signed by Shashi Sanchez MD at 2/2/2024 11:58 PM  Version 1 of 1    Author: Shashi Sanchez MD Service: — Author Type:  Physician    Filed: 2024 11:58 PM Date of Service: 2024 11:44 PM Status: Signed    : Shashi Sanchez MD (Physician)         History and Physical     Margaret Silverio Patient Status:  Emergency    3/14/1945 MRN [id number]   Location [facility] EMERGENCY DEPARTMENT Attending Shashi Williamson*   Hosp Day # 0 PCP Johnathon Rodriguez DO     Chief Complaint: Shortness of breath    Subjective:    Margaret Silverio is a 78 year old female with well-known to us from multiple previous admissions she was just discharged on .  She had been admitted for GI bleed and found to have AVMs.  She was discharged off of oral anticoagulation and has a history of atrial fibrillation.  She returns with increased weakness and shortness of breath.   In the emergency room she was found to have an elevated BNP of 18,762.  She had a normal white count hemoglobin of 9.5 platelet count of 184.  Blood sugar was low at 55, creatinine was 1.47 which appears to be baseline with a GFR of 37 troponin was negative.  She was treated with Lasix 40 mg IV.    History/Other:      Past Medical History:  Past Medical History:   Diagnosis Date    Anemia     Arrhythmia     Arthritis     Deep vein thrombosis (HCC)     Essential hypertension     High blood pressure     History of blood transfusion     Seizure disorder (HCC)     Seizures (HCC)         Past Surgical History:   Past Surgical History:   Procedure Laterality Date    COLONOSCOPY N/A 2024    Dr. Rios    COLONOSCOPY N/A 2024    Procedure: COLONOSCOPY;  Surgeon: Zoraida Rios MD;  Location: [facility] ENDOSCOPY    EGD N/A 2024    Dr. Rios    OTHER SURGICAL HISTORY  2017    Heart Surgery     OTHER SURGICAL HISTORY      Bilateral shoulder replacement        Social History:  reports that she quit smoking about 10 years ago. Her smoking use included cigarettes. She smoked an average of 0.3 packs per day. She has never used smokeless tobacco. She  reports that she does not drink alcohol and does not use drugs.    Family History: No family history on file.    Allergies:   Allergies   Allergen Reactions    Lisinopril Coughing       Medications:    Current Facility-Administered Medications on File Prior to Encounter   Medication Dose Route Frequency Provider Last Rate Last Admin    [COMPLETED] potassium chloride (K-Dur) tab 40 mEq  40 mEq Oral Once Katiana Ochoa MD   40 mEq at 24 0850    [COMPLETED] magnesium oxide (Mag-Ox) tab 800 mg  800 mg Oral Once Katiana Ochoa MD   800 mg at 24 0850    [COMPLETED] magnesium oxide (Mag-Ox) tab 800 mg  800 mg Oral Once Katiana Ochoa MD   800 mg at 24 0856    [COMPLETED] potassium chloride 40 mEq in 250mL sodium chloride 0.9% IVPB premix  40 mEq Intravenous Once Rashaad Nicholson MD 62.5 mL/hr at 24 0912 40 mEq at 24 0912    [COMPLETED] potassium phosphate dibasic 15 mmol in sodium chloride 0.9% 250 mL IVPB  15 mmol Intravenous Once Rashaad Nicholson MD 62.5 mL/hr at 24 0852 15 mmol at 24 0852    Followed by    [COMPLETED] potassium chloride 40 mEq in 250mL sodium chloride 0.9% IVPB premix  40 mEq Intravenous Once Rashaad Nicholson MD 62.5 mL/hr at 24 1815 40 mEq at 24 1815    [COMPLETED] sodium chloride 0.9 % IV bolus 500 mL  500 mL Intravenous Once Luis Sargent  mL/hr at 24 0732 500 mL at 24 0732    [COMPLETED] iopamidol 76% (ISOVUE-370) injection for power injector  70 mL Intravenous ONCE PRN Rashaad Nicholson MD   70 mL at 24 1229    [COMPLETED] magnesium oxide (Mag-Ox) tab 800 mg  800 mg Oral Once Rashaad Nicholson MD   800 mg at 24 0819    [COMPLETED] furosemide (Lasix) 10 mg/mL injection 20 mg  20 mg Intravenous Once Romi Barrow APRN   20 mg at 24 1103    [] metoprolol tartrate (Lopressor) tab 50 mg  50 mg Oral Once PRN Luis Sargent MD        Or    [] metoprolol tartrate (Lopressor) tab 100 mg  100 mg Oral  Once PRN Luis Sargent MD        [COMPLETED] metoprolol tartrate (Lopressor) tab 50 mg  50 mg Oral Once Luis Sargent MD   50 mg at 24 2200    Or    [COMPLETED] metoprolol tartrate (Lopressor) tab 100 mg  100 mg Oral Once Luis Sargent MD        [COMPLETED] metoprolol tartrate (Lopressor) tab 50 mg  50 mg Oral Once Luis Sargent MD   50 mg at 24 0518    Or    [COMPLETED] metoprolol tartrate (Lopressor) tab 100 mg  100 mg Oral Once Luis Sargent MD        [] metoprolol tartrate (Lopressor) tab 50 mg  50 mg Oral Once PRN Luis Sargent MD        Or    [] metoprolol tartrate (Lopressor) tab 100 mg  100 mg Oral Once PRN Luis Sargent MD        [COMPLETED] polyethylene glycol-electrolyte solution (Plenvu) 140 g SOLR 480 mL  480 mL Oral BID@0300,1800 Jonel Barrientos MD   480 mL at 24 0200    [COMPLETED] potassium phosphate dibasic 15 mmol in sodium chloride 0.9% 250 mL IVPB  15 mmol Intravenous Once Regan Cruz MD   Stopped at 01/15/24 1820    Followed by    [COMPLETED] potassium chloride 20 mEq/100mL IVPB premix 20 mEq  20 mEq Intravenous Once Regan Cruz MD 50 mL/hr at 24 0148 20 mEq at 24 0148    [COMPLETED] magnesium oxide (Mag-Ox) tab 800 mg  800 mg Oral Once Regan Cruz MD   800 mg at 01/15/24 1053    [COMPLETED] sodium chloride 0.9% infusion   Intravenous Once Regan Cruz MD 10 mL/hr at 01/15/24 1820 New Bag at 01/15/24 1820    [COMPLETED] polyethylene glycol-electrolyte (Golytely) 236 g oral solution 4,000 mL  4,000 mL Oral Once Tiffany Reynolds PA-C   4,000 mL at 01/15/24 1725    [COMPLETED] potassium phosphate dibasic 15 mmol in sodium chloride 0.9% 250 mL IVPB  15 mmol Intravenous Once Regan Cruz MD 62.5 mL/hr at 24 0905 15 mmol at 24    Followed by    [COMPLETED] potassium chloride 20 mEq/100mL IVPB premix 20 mEq  20 mEq Intravenous Once Regan Cruz MD 50 mL/hr at 24 1323 20 mEq at 24 1323    [COMPLETED]  potassium chloride 40 mEq in 250mL sodium chloride 0.9% IVPB premix  40 mEq Intravenous Once Yaz Bernard MD 62.5 mL/hr at 01/13/24 0657 40 mEq at 01/13/24 0657    [COMPLETED] sodium chloride 0.9% infusion   Intravenous Once Regan Cruz MD 10 mL/hr at 01/13/24 0930 New Bag at 01/13/24 0930    [COMPLETED] phytonadione (Aqua-Mephton) 2 mg in sodium chloride 0.9% 50 mL IVPB  2 mg Intravenous Once Augustin Parsons  mL/hr at 01/13/24 1430 2 mg at 01/13/24 1430    [COMPLETED] furosemide (Lasix) 10 mg/mL injection 20 mg  20 mg Intravenous Once Regan Cruz MD   20 mg at 01/13/24 1554    [COMPLETED] furosemide (Lasix) 10 mg/mL injection 40 mg  40 mg Intravenous Once Ricco Encinas MD   40 mg at 01/13/24 1830    [COMPLETED] sodium chloride 0.9 % IV bolus 500 mL  500 mL Intravenous Once Merritt Queen MD   Stopped at 01/12/24 2237    [COMPLETED] pantoprazole (Protonix) 40 mg in sodium chloride 0.9% PF 10 mL IV push  40 mg Intravenous Once Merritt Queen MD   40 mg at 01/12/24 2157    [COMPLETED] glucagon (GlucaGen) injection 1 mg  1 mg Intramuscular Once Merritt Queen MD   1 mg at 01/12/24 2048    [COMPLETED] piperacillin-tazobactam (Zosyn) 3.375 g in dextrose 5% 100 mL IVPB-ADDV  3.375 g Intravenous Once Merritt Queen MD   Stopped at 01/12/24 2228    [COMPLETED] glucagon (GlucaGen) injection 1 mg  1 mg Intramuscular Once Merritt Queen MD   1 mg at 01/12/24 2107    [COMPLETED] dextrose 50% injection 50 mL  50 mL Intravenous Once Merritt Queen MD   50 mL at 01/12/24 2131    [COMPLETED] iopamidol 76% (ISOVUE-370) injection for power injector  80 mL Intravenous ONCE PRN Merritt Queen MD   80 mL at 01/12/24 0317     Current Outpatient Medications on File Prior to Encounter   Medication Sig Dispense Refill    HYDROcodone-acetaminophen (NORCO)  MG Oral Tab Take 1 tablet by mouth every 6 (six) hours as needed for Pain. 120 tablet 0    AMITRIPTYLINE 25 MG Oral Tab TAKE 1 TABLET(25 MG) BY  MOUTH EVERY NIGHT 30 tablet 0    pantoprazole 40 MG Oral Tab EC Take 1 tablet (40 mg total) by mouth 2 (two) times daily before meals. 60 tablet 0    furosemide 40 MG Oral Tab Take 1 tablet (40 mg total) by mouth daily. 30 tablet 0    methotrexate 2.5 MG Oral Tab TAKE 6 TABLETS BY MOUTH 1 TIME A WEEK 77 tablet 0    alendronate 70 MG Oral Tab Take 1 tablet (70 mg total) by mouth once a week. 12 tablet 0    ENTRESTO 24-26 MG Oral Tab Take 1 tablet by mouth 2 (two) times daily. 180 tablet 0    folic acid 800 MCG Oral Tab Take 1 tablet (800 mcg total) by mouth daily. 90 tablet 1    ferrous sulfate 325 (65 FE) MG Oral Tab EC Take 1 tablet (325 mg total) by mouth daily with breakfast.      carvedilol 25 MG Oral Tab Take 1 tablet (25 mg total) by mouth 2 (two) times daily. 60 tablet 0       Review of Systems:   A comprehensive 14 point review of systems was completed.    Pertinent positives and negatives noted in the HPI.    Objective:     /77   Pulse 78   Temp 97.3 °F (36.3 °C) (Oral)   Resp 19   Wt 135 lb (61.2 kg)   SpO2 99%   BMI 22.47 kg/m²   General: No acute distress.  Alert   HEENT: Normocephalic atraumatic. Moist mucous membranes. EOM-I. PERRLA. Anicteric.  Neck: No lymphadenopathy. No JVD. No carotid bruits.  Respiratory: Crackles heard posteriorly in the bases no wheezes. No rhonchi.    Cardiovascular: Regular rate and rhythm. No murmurs, rubs or gallops. Equal pulses.   Chest and Back: No tenderness or deformity.  Abdomen: Soft, nontender, nondistended.  Positive bowel sounds. No rebound, guarding or organomegaly.  Neurologic: No focal neurological deficits. CNII-XII grossly intact.  Musculoskeletal: Moves all extremities.  Extremities: No edema or cyanosis.  Psychiatric: Appropriate mood and affect.  Integument: No rashes or lesions.   Skin Moisture: Warm and Dry, Skin Color: Normal   Peripheral Radial Pulse: Right 1+ or Left 1+      Results:    Labs:    Selected labs - last 24 hours:  Endo  Lytes   Renal   Glu 55  Na 137 Ca 8.8  BUN 17   POC Gluc  -  K 4.3 PO4 -  Cr 1.47   A1c -  Cl 101 Mg -  eGFR 37   TSH -  CO2 19.0         LFT  CBC  Other   AST -  WBC 4.2  PTT - Procal -   ALT -  Hb 9.5  INR - CRP -   APk -  Hct 29.8  Trop - D dim -   T desirae -  .0  pBNP -  BNP -  18,762 Ferritin  -   Prot -    CK  - Lactate  -   Alb -    LDL  - COVID  Not Detected       Imaging: Imaging data reviewed in Epic.    Assessment & Plan:    # 78-year-old female comes emergency room with shortness of breath found to have a pulse oximetry of 90 on room air placed on oxygen with relief and given Lasix.  Laboratory revealed elevated BNP x-ray did not appear wet but clinically she was.    Acute on chronic combined CHF  Severe tricuspid valve regurgitation  Bioprosthetic mitral valve  NICM status post ICD in September 2023  Recent echo showed ejection fraction 37% with grade 4 tricuspid regurg  Cardiology is now on consult  Lasix given in the emergency room and started on Bumex 1 mg twice daily  Continue Entresto and carvedilol  Paroxysmal atrial fibrillation   Hold oral anticoagulation given recent GI bleed  Currently rate controlled  Still considering outpatient Watchman procedure given her recurrent GI bleeds  Chronic kidney injury stage III  Baseline creatinine is about 1.3  Current creatinine 1.47  Follow urine output  Lower extremity edema  Appears much improved  Perceived hypoxic respiratory failure  Pulse oximetry is normal on room air  Patient is currently on Lasix 40 mg IV x 1 then Bumex 1 mg twice daily  Recent GI bleed secondary to AVMs  Chronic anemia    Quality:  DVT Mechanical Prophylaxis:        DVT Pharmacologic Prophylaxis   Medication   None                Code Status: Full Code  Hooks: No urinary catheter in place  Hooks Duration (in days):   Central line:    CELIO:     Plan of care discussed with patient and her family including  and daughter    Shashi Sanchez MD  2/2/2024  45 minutes spent on this  admission - examining patient, obtaining history, reviewing previous medical records, going over test results/imaging and discussing plan of care. All questions answered.    Electronically signed by Shashi Sanchez MD on 2/2/2024 11:58 PM              Consults - MD Consult Notes      Consults signed by Will Gonzalez MD at 2/11/2024 12:10 PM     Author: Will Gonzalez MD Service: Nephrology Author Type: Physician    Filed: 2/11/2024 12:10 PM Date of Service: 2/11/2024 11:52 AM Status: Signed    : Will Gonzalez MD (Physician)       [facility]    Report of Consultation    Date of Admission:  2/2/2024  Date of Consult:  2/11/2024   Reason for Consultation:     GEORGE    History of Present Illness:   Patient is a 78 year old female with past medical history of HFrEF with EF 15-20%, nonischemic cardiomyopathy s/p ICD, A-fib off of anticoagulation, GI bleed from AVMs, hypertension, bioprosthetic mitral valve who presented to the emergency room on 2/2 for shortness of breath    Patient was admitted for acute heart failure exacerbation and diuretics dose was increased.  Patient was transferred to ICU for cardiogenic shock and lactic acidosis and respiratory failure.  And was started on dual inotropes-currently on dobutamine and milrinone drip.    Left breast on admit showed BUNs/creatinine 17/1.47 mg/dL.  Creatinine improved to 1.1 mg/dL and now stable at 1.6 for 24 hours.  Currently Hooks in place with urine output of 300 mL this morning.  Son at bedside and able to provide history, patient tired and lethargic.  On 3 L nasal cannula      Past Medical History  Past Medical History:   Diagnosis Date    Anemia     Arrhythmia     Arthritis     Deep vein thrombosis (HCC)     Essential hypertension     High blood pressure     History of blood transfusion     Seizure disorder (HCC)     Seizures (HCC)        Past Surgical History  Past Surgical History:   Procedure Laterality Date    COLONOSCOPY  N/A 01/17/2024    Dr. Rios    COLONOSCOPY N/A 1/17/2024    Procedure: COLONOSCOPY;  Surgeon: Zoraida Rios MD;  [facility]: [facility] ENDOSCOPY    EGD N/A 01/17/2024    Dr. Rios    OTHER SURGICAL HISTORY  2017    Heart Surgery     OTHER SURGICAL HISTORY  2020    Bilateral shoulder replacement        Family History  No family history on file.    Social History  Pediatric History   Patient Parents    Not on file     Other Topics Concern    Not on file   Social History Narrative    Not on file           Current Medications:  Current Facility-Administered Medications   Medication Dose Route Frequency    sodium chloride 0.9% infusion   Intravenous Once    DOBUTamine in dextrose 5% (Dobutrex) 500 mg/250mL infusion premix  5 mcg/kg/min (Dosing Weight) Intravenous Continuous    meropenem (Merrem) 500 mg in sodium chloride 0.9% 100 mL IVPB-MBP  500 mg Intravenous q12h    milrinone in dextrose 5% (Primacor) 20 mg/100mL infusion premix  0.25 mcg/kg/min Intravenous Continuous    amiodarone (Pacerone) tab 200 mg  200 mg Oral Daily    [Held by provider] spironolactone (Aldactone) tab 25 mg  25 mg Oral Daily    senna-docusate (Senokot-S) 8.6-50 MG per tab 2 tablet  2 tablet Oral BID    [Held by provider] furosemide (Lasix) tab 40 mg  40 mg Oral Daily    dexamethasone (Decadron) tab 40 mg  40 mg Oral Daily with breakfast    traMADol (Ultram) tab 50 mg  50 mg Oral Q6H PRN    polyethylene glycol (PEG 3350) (Miralax) 17 g oral packet 17 g  17 g Oral Daily PRN    magnesium hydroxide (Milk of Magnesia) 400 MG/5ML oral suspension 30 mL  30 mL Oral Daily PRN    bisacodyl (Dulcolax) 10 MG rectal suppository 10 mg  10 mg Rectal Daily PRN    fleet enema (Fleet) 7-19 GM/118ML rectal enema 133 mL  1 enema Rectal Once PRN    amitriptyline (Elavil) tab 25 mg  25 mg Oral Nightly    sacubitril-valsartan (Entresto) 24-26 MG per tab 1 tablet  1 tablet Oral BID    ferrous sulfate DR tab 325 mg  325 mg Oral Daily with breakfast    folic acid (Folvite) tab 800  mcg  800 mcg Oral Daily    HYDROcodone-acetaminophen (Norco)  MG per tab 1 tablet  1 tablet Oral Q6H PRN    pantoprazole (Protonix) DR tab 40 mg  40 mg Oral BID AC    alendronate (Fosamax) tab 70 mg  70 mg Oral Weekly       Allergies  Allergies   Allergen Reactions    Lisinopril Coughing       Review of Systems:     Unable to obtain    Physical Exam:   Height: --  Weight: --  BSA (Calculated - sq m): --  Pulse: 60 (02/11 0655)  BP: 139/85 (02/11 0655)  Temp: 96.4 °F (35.8 °C) (02/11 0655)  Do Not Use - Resp Rate: --  SpO2: 100 % (02/11 0655)  Temp:  [96.4 °F (35.8 °C)-97.3 °F (36.3 °C)] 96.4 °F (35.8 °C)  Pulse:  [56-61] 60  Resp:  [11-21] 14  BP: (106-152)/() 139/85  SpO2:  [96 %-100 %] 100 %  SpO2: 100 %     Intake/Output Summary (Last 24 hours) at 2/11/2024 1152  Last data filed at 2/11/2024 0640  Gross per 24 hour   Intake 705.3 ml   Output --   Net 705.3 ml     Wt Readings from Last 5 Encounters:   02/09/24 147 lb 14.4 oz (67.1 kg)   01/20/24 153 lb 11.2 oz (69.7 kg)   12/26/23 145 lb (65.8 kg)   09/21/23 145 lb (65.8 kg)   08/21/23 141 lb 12.8 oz (64.3 kg)       General appearance: Fatigued and lethargy  Head: Normocephalic, atraumatic  Eyes: conjunctivae/corneas clear  Throat: lips, mucosa, and tongue normal; teeth and gums normal  Neck:  no JVD, supple, symmetrical  Abdominal: soft, non-tender;  Extremities: extremities normal, no edema  Skin: No rashes or lesions  Neurologic: Unable to examine  Psychiatric: calm    Results:     Laboratory Data:  Recent Labs   Lab 02/10/24  0745 02/10/24  1352 02/11/24  0514   RBC 2.66* 2.72* 2.44*   HGB 8.1* 8.6* 7.4*   HCT 25.5* 26.9* 22.7*   MCV 95.9 98.9 93.0   NEPRELIM 3.29 4.42 5.93   WBC 3.8* 5.2 6.6   PLT 23.0* 18.0* 14.0*     Recent Labs   Lab 02/06/24  1520 02/07/24  0712 02/09/24  0501 02/10/24  0745 02/10/24  1352 02/11/24  0514   GLU  --    < > 84 114* 90 128*   BUN  --    < > 17 25* 25* 33*   CREATSERUM  --    < > 1.11* 1.47* 1.66* 1.68*   CA  --     < > 8.4* 8.8 8.7 8.5*   ALB 2.99*  --  3.0*  --  3.7 3.3   NA  --    < > 140 137 137 137   K  --    < > 3.7  3.7 5.1  5.1 5.4* 5.0   CL  --    < > 106 103 102 102   CO2  --    < > 25.0 20.0* 16.0* 24.0   ALKPHO  --   --  153*  --  181*  --    AST  --   --  21  --  59*  --    ALT  --   --  11  --  16  --    BILT  --   --  1.9*  --  2.3*  --    TP 5.8  --  6.0  --  7.1  --     < > = values in this interval not displayed.     PTT   Date Value Ref Range Status   02/09/2024 35.2 23.3 - 35.6 seconds Final     INR   Date Value Ref Range Status   02/09/2024 1.25 (H) 0.80 - 1.20 Final     Comment:     Only the INR (not the PT value) should be utilized for   the monitoring of oral anticoagulant therapy.     Recommended therapeutic ranges for anticoagulant therapy are   as follows:   2.0 - 3.0 All indications except for mechanical prosthetic   cardiac valves.     2.5 - 3.5 Mechanical prosthetic cardiac valves.       No results for input(s): \"BNP\" in the last 168 hours.  Lab Results   Component Value Date    COLORUR Yellow 02/11/2024    CLARITY Clear 02/11/2024    SPECGRAVITY 1.017 02/11/2024    GLUUR Normal 02/11/2024    BILUR Negative 02/11/2024    KETUR Negative 02/11/2024    BLOODURINE 2+ 02/11/2024    PHURINE 5.5 02/11/2024    PROUR 30 02/11/2024    UROBILINOGEN Normal 02/11/2024    NITRITE Negative 02/11/2024    LEUUR Negative 02/11/2024           Impression:       Patient is a 78 year old female with past medical history of HFrEF with EF 15-20%, nonischemic cardiomyopathy s/p ICD, A-fib off of anticoagulation, GI bleed from AVMs, hypertension, bioprosthetic mitral valve who presented to the emergency room on 2/2 for shortness of breath    1.GEORGE: Urine output low  UA positive hyaline cast.  FENa low with urine sodium 12  GEORGE secondary to cardiorenal syndrome/cardiogenic shock  Currently on dual inotropes to increase renal perfusion  BUNs/creatinine 17/1.47 mg/dL on admit - >Creatinine improved to 1.1 mg/dL and now  stable at 1.6 for 24 hours  Continue to monitor renal function and strict I's and O's  On Entresto twice daily-for now we will continue but if kidney functions worsen tomorrow will consider holding  Patient is not a dialysis candidate given end-stage heart disease    2.cardiogenic shock-HFrEF with EF 15-20%, nonischemic cardiomyopathy s/p ICD, A-fib off of anticoagulation  On dobutamine and milrinone  Cardiology input noted    3.Thrombocytopenia:   Hematology input noted  On steroid-no improvement in platelet  S/p platelet transfusion    4.hyperkalemia:  Secondary to lactic acidosis and GEORGE  Potassium high normal-low potassium diet  On Entresto-continue for now    Discussed with nursing and will discuss with Dr. Barry      Thank you for allowing me to participate in the care of your patient.    Will Puckett MD  2/11/2024      Electronically signed by Will Gonzalez MD on 2/11/2024 12:10 PM           D/C Summary    No notes of this type exist for this encounter.     Imaging Results (HF patients)    Chest X-Ray Results (HF patients only)    XR CHEST PA + LAT CHEST (CPT=71046)    Result Date: 2/5/2024  PROCEDURE: XR CHEST PA + LAT CHEST (CPT=71046)  COMPARISON: [facility], XR CHEST AP PORTABLE (CPT=71045), 2/02/2024, 8:58 PM.  [facility], XR CHEST AP PORTABLE (CPT=71045), 1/16/2024, 6:27 AM.  [facility], XR CHEST AP PORTABLE (CPT=71045), 1/14/2024, 5:15 AM.  INDICATIONS: Weakness; check  pneumonia.  TECHNIQUE:   Two views.   FINDINGS: CARDIAC/VASC: Stable cardiomegaly.  Left chest dual lead ICD noted and banded pacing wire overlying the right atrium.  Status post mitral valvuloplasty.  Mild vascular congestion. MEDIAST/CHUY: No visible mass or adenopathy. LUNGS/PLEURA: Mild increased opacity in the right lung base which may reflect combination of small effusion and parenchymal opacity.  Small left pleural effusion.  No pneumothorax. BONES: Multilevel degenerative  change noted thoracic spine.  Grossly intact bilateral shoulder arthroplasties. OTHER: Negative.          CONCLUSION:   Stable cardiomegaly with mild vascular congestion.  Mild increased opacification of the right lung base which may reflect small effusion and atelectasis and or pneumonia.  Small left pleural effusion.    Dictated by (CST): Azar Mendez MD on 2024 at 11:22 AM     Finalized by (CST): Azar Mendez MD on 2024 at 11:26 AM              2D Echocardiogram Results (HF patients only)    CARD ECHO 2D DOPPLER (CPT=93306)    Result Date: 2/10/2024  Transthoracic Echocardiogram Name:Margaret Silverio Date: 02/10/2024 :  1945 Ht:  (63in)  BP: 115 / 76 MRN:  [id number]    Age:  78years    Wt:  (147lb) HR: 75bpm Loc:  [facility]       Gndr: F          BSA: 1.7m^2 Sonographer: Priscilla Joseph Ordering:    Macy Schuster Consulting:  Aroldo Pacheco ---------------------------------------------------------------------------- History/Indications:  Lactic acidosis. ---------------------------------------------------------------------------- Procedure information:  A transthoracic complete 2D study was performed. Additional evaluation included M-mode, complete spectral Doppler, and color Doppler.  Patient status:  Inpatient.  Location:  Bedside.    Comparison was made to the study of 2024.    This was a STAT study.  Image quality was adequate. Intravenous contrast (Definity) was administered to evaluate left ventricular function. ---------------------------------------------------------------------------- Conclusions: 1. Left ventricle: The cavity size was normal. Wall thickness was normal.    Systolic function was normal. The estimated ejection fraction was 15-20%,    by biplane method of disks. Doppler parameters are consistent with an    irreversible restrictive pattern, indicative of decreased left    ventricular diastolic compliance and/or increased left atrial pressure -    grade 4  diastolic dysfunction. 2. Right ventricle: The cavity size was markedly increased. Pacer wire noted    in the right ventricle. Although right ventricular systolic pressure was    not increased relative to right atrial pressure, the right atrial    pressure would appear to be markedly elevated as a result of wide-open    tricuspid regurgitation. Absolute RV systolic pressure was, therefore, at    least moderately increased. 3. Ventricular septum: Septal motion was dyssynergic. 4. Left atrium: The atrium was increased in size. Echodensity seen in LA. 5. Right atrium: The atrium was markedly dilated. Pacer wire noted in right    atrium. 6. Mitral valve: A bioprosthesis is present and functioning normally. 7. Tricuspid valve: There was malcoaptation of the valve leaflets. There was    wide-open regurgitation. * ---------------------------------------------------------------------------- * Findings: Left ventricle:  The cavity size was normal. Wall thickness was normal. Systolic function was normal. The estimated ejection fraction was 15-20%, by biplane method of disks. No diagnostic evidence for diffuse regional wall motion abnormalities. Doppler parameters are consistent with an irreversible restrictive pattern, indicative of decreased left ventricular diastolic compliance and/or increased left atrial pressure - grade 4 diastolic dysfunction. Ventricular septum:   Septal motion was dyssynergic. Left atrium:  The atrium was increased in size. Echodensity seen in LA. Right ventricle:  The cavity size was markedly increased. Pacer wire noted in the right ventricle. Systolic function was normal.  Although right ventricular systolic pressure was not increased relative to right atrial pressure, the right atrial pressure would appear to be markedly elevated as a result of wide-open tricuspid regurgitation. Absolute RV systolic pressure was, therefore, at least moderately increased. Right atrium:  The atrium was markedly  dilated. Pacer wire noted in right atrium. Mitral valve:  A bioprosthesis is present and functioning normally. Leaflet separation was normal.  Doppler:  Transvalvular velocity was within the normal range. There was no evidence for stenosis. There was no significant regurgitation. Aortic valve:  The valve was structurally normal. The valve was trileaflet. Cusp separation was normal.  Doppler:  Transvalvular velocity was within the normal range. There was no evidence for stenosis. There was no significant regurgitation. Tricuspid valve:  The valve is structurally normal. Leaflet separation was normal. There was malcoaptation of the valve leaflets.  Doppler: Transvalvular velocity was within the normal range. There was no evidence for stenosis. There was wide-open regurgitation. Pulmonic valve:   The valve is structurally normal. Cusp separation was normal.  Doppler:  Transvalvular velocity was within the normal range. There was no evidence for stenosis. There was mild regurgitation. Pericardium:   There was no pericardial effusion. Aorta: Aortic root: The aortic root was normal-sized. Ascending aorta: The ascending aorta was normal. Systemic veins:  Central venous respirophasic diameter changes are blunted (< 50%). Inferior vena cava: The IVC was dilated. ---------------------------------------------------------------------------- Measurements  Systemic veins      Value        Ref    01/14/2024  Estimated CVP       15    mm Hg  ------ 8  Right ventricle     Value        Ref    01/14/2024  TAPSE, 2D       (L) 0.97  cm     >=1.70 1.58  RV s', lateral  (L) 7.6   cm/sec >=9.5  7.6 Legend: (L)  and  (H)  kulwant values outside specified reference range. ---------------------------------------------------------------------------- Prepared and electronically signed by Twin Boles 02/10/2024 17:27         Cath Angiogram Results (HF Patients only)    No exam resulted this encounter.          Physical Therapy Notes (last 72 hours)       Physical Therapy Note signed by Haase, Jenna, PT at 2/15/2024  5:15 PM  Version 2 of 2    Author: Haase, Jenna, PT Service: Rehab Author Type: Physical Therapist    Filed: 2/15/2024  5:15 PM Date of Service: 2/15/2024  2:22 PM Status: Addendum    : Haase, Jenna, PT (Physical Therapist)    Related Notes: Original Note by Haase, Jenna, PT (Physical Therapist) filed at 2/15/2024  5:14 PM       PHYSICAL THERAPY TREATMENT NOTE - INPATIENT     Room Number: 230/230-A       Presenting Problem: acute on chronic CHF, weakness and shortness of breath. Fell out of bed.  Co-Morbidities : pacemaker; RA    Problem List  Principal Problem:    Acute on chronic congestive heart failure, unspecified heart failure type (HCC)  Active Problems:    Pancytopenia (HCC)    Goals of care, counseling/discussion    Advance care planning    Palliative care by specialist    Acute deep vein thrombosis (DVT) of left upper extremity (HCC)    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HCC)    Cardiogenic shock (HCC)    HFrEF (heart failure with reduced ejection fraction) (HCC)    Anemia      PHYSICAL THERAPY ASSESSMENT   Patient demonstrates fair progress this session, goals  remain in progress.    Patient continues to function below baseline with bed mobility, transfers, gait, and standing prolonged periods.  Contributing factors to remaining limitations include decreased functional strength, decreased endurance/aerobic capacity, impaired sitting and standing balance, and decreased muscular endurance.  Next session anticipate patient to progress bed mobility, transfers, and gait.  Physical Therapy will continue to follow patient for duration of hospitalization.    Patient continues to benefit from continued skilled PT services: to promote return to prior level of function and safety with continuous assistance and gradual rehabilitative therapy .    PLAN  PT Treatment Plan: Bed mobility;Body mechanics;Coordination;Endurance;Patient education;Gait  training;Strengthening;Transfer training;Balance training  Frequency (Obs): 3-5x/week    SUBJECTIVE  \"I'll be grateful for anything.\"    OBJECTIVE  Precautions: Bed/chair alarm    PAIN ASSESSMENT   Ratin  Location: L UE  Management Techniques: Activity promotion;Body mechanics;Relaxation;Repositioning    BALANCE  Static Sitting: Fair -  Dynamic Sitting: Poor +  Static Standing: Poor  Dynamic Standing: Poor -    ACTIVITY TOLERANCE  Pulse: 63  Heart Rate Source: Monitor     BP: 143/78 (134/91 mmHg sitting EOB - mild dizziness reported; cues provided for gaze stabilization)  BP Location: Right arm  BP Method: Automatic  Patient Position: Lying     O2 WALK  Oxygen Therapy  SPO2% on Room Air at Rest: 95  SPO2% Ambulation on Room Air: 95    AM-PAC '6-Clicks' INPATIENT SHORT FORM - BASIC MOBILITY  How much difficulty does the patient currently have...  Patient Difficulty: Turning over in bed (including adjusting bedclothes, sheets and blankets)?: A Lot   Patient Difficulty: Sitting down on and standing up from a chair with arms (e.g., wheelchair, bedside commode, etc.): A Lot   Patient Difficulty: Moving from lying on back to sitting on the side of the bed?: A Lot   How much help from another person does the patient currently need...   Help from Another: Moving to and from a bed to a chair (including a wheelchair)?: A Lot   Help from Another: Need to walk in hospital room?: A Lot   Help from Another: Climbing 3-5 steps with a railing?: Total     AM-PAC Score:  Raw Score: 11   Approx Degree of Impairment: 72.57%   Standardized Score (AM-PAC Scale): 33.86   CMS Modifier (G-Code): CL    FUNCTIONAL ABILITY STATUS  Functional Mobility/Gait Assessment  Gait Assistance: Not tested  Distance (ft): SPT  Assistive Device: None (holding onto therapist)  Pattern: Shuffle  Supine to Sit: maximum assist. Patient tolerated static sitting EOB for approx 10 minutes with intermittent BUE support and Min A>CGA in order to maintain static  sitting balance. While attempting to perform dynamic BLE therex, patient required Min>Mod A in order to maintain dynamic sitting balance.  SPT bed>chair: moderate assist x 2 with gait belt    Additional information: Patient with increased alertness this session compared to last PT session. Patient able to follow all commands with increased time and cues. Patient motivated to progress mobility and transfer to bedside chair this session.     The patient's Approx Degree of Impairment: 72.57% has been calculated based on documentation in the [facility] '6 clicks' Inpatient Daily Activity Short Form.  Research supports that patients with this level of impairment may benefit from rehab.  Final disposition will be made by interdisciplinary medical team.    THERAPEUTIC EXERCISES  Lower Extremity Ankle pumps  LAQ     Position Sitting       Patient in bed upon arrival. RN approved activity. Educated patient on POC and benefits of mobilization. Agreeable to participate. Patient reporting no pain.   Patient End of Session: Up in chair;Needs met;Call light within reach;RN aware of session/findings;All patient questions and concerns addressed    CURRENT GOALS   Goals to be met by: 2/17/24  Patient Goal Patient's self-stated goal is: return to PLOF   Goal #1 Patient is able to demonstrate supine - sit EOB @ level: CGA      Goal #1   Current Status Max A   Goal #2 Patient is able to demonstrate transfers EOB to/from Chair/Wheelchair at assistance level: minimum assistance with walker - rolling      Goal #2  Current Status  Mod A x 2 SPT bed>chair   Goal #3 Patient is able to ambulate 25 feet with assist device: walker - rolling at assistance level: minimum assistance   Goal #3   Current Status  NT   Goal #4 Patient to demonstrate independence with home activity/exercise instructions provided to patient in preparation for discharge.   Goal #4   Current Status  Ongoing     Therapeutic Activity: 25 minutes         Physical Therapy Note  signed by Haase, Jenna, PT at 2/15/2024  5:14 PM  Version 1 of 2    Author: Haase, Jenna, PT Service: Rehab Author Type: Physical Therapist    Filed: 2/15/2024  5:14 PM Date of Service: 2/15/2024  2:22 PM Status: Signed    : Haase, Jenna, PT (Physical Therapist)    Related Notes: Addendum by Haase, Jenna, PT (Physical Therapist) filed at 2/15/2024  5:15 PM       PHYSICAL THERAPY TREATMENT NOTE - INPATIENT     Room Number: 309/309-A       Presenting Problem: acute on chronic CHF, weakness and shortness of breath. Fell out of bed.  Co-Morbidities : pacemaker; RA    Problem List  Principal Problem:    Acute on chronic congestive heart failure, unspecified heart failure type (HCC)  Active Problems:    Pancytopenia (HCC)    Goals of care, counseling/discussion    Advance care planning    Palliative care by specialist    Acute deep vein thrombosis (DVT) of left upper extremity (HCC)    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HCC)    Cardiogenic shock (HCC)    HFrEF (heart failure with reduced ejection fraction) (HCC)    Anemia      PHYSICAL THERAPY ASSESSMENT   Patient demonstrates fair progress this session, goals  remain in progress.    Patient continues to function below baseline with bed mobility, transfers, gait, and standing prolonged periods.  Contributing factors to remaining limitations include decreased functional strength, decreased endurance/aerobic capacity, impaired sitting and standing balance, and decreased muscular endurance.  Next session anticipate patient to progress bed mobility, transfers, and gait.  Physical Therapy will continue to follow patient for duration of hospitalization.    Patient continues to benefit from continued skilled PT services: to promote return to prior level of function and safety with continuous assistance and gradual rehabilitative therapy .    PLAN  PT Treatment Plan: Bed mobility;Body mechanics;Coordination;Endurance;Patient education;Gait training;Strengthening;Transfer  training;Balance training  Frequency (Obs): 3-5x/week    SUBJECTIVE  \"I'll be grateful for anything.\"    OBJECTIVE  Precautions: Bed/chair alarm    PAIN ASSESSMENT   Ratin  Location: L UE  Management Techniques: Activity promotion;Body mechanics;Relaxation;Repositioning    BALANCE  Static Sitting: Fair -  Dynamic Sitting: Poor +  Static Standing: Poor  Dynamic Standing: Poor -    ACTIVITY TOLERANCE  Pulse: 63  Heart Rate Source: Monitor     BP: 143/78 (134/91 mmHg sitting EOB - mild dizziness reported; cues provided for gaze stabilization)  BP Location: Right arm  BP Method: Automatic  Patient Position: Lying     O2 WALK  Oxygen Therapy  SPO2% on Room Air at Rest: 95  SPO2% Ambulation on Room Air: 95    AM-PAC '6-Clicks' INPATIENT SHORT FORM - BASIC MOBILITY  How much difficulty does the patient currently have...  Patient Difficulty: Turning over in bed (including adjusting bedclothes, sheets and blankets)?: A Lot   Patient Difficulty: Sitting down on and standing up from a chair with arms (e.g., wheelchair, bedside commode, etc.): A Lot   Patient Difficulty: Moving from lying on back to sitting on the side of the bed?: A Lot   How much help from another person does the patient currently need...   Help from Another: Moving to and from a bed to a chair (including a wheelchair)?: A Lot   Help from Another: Need to walk in hospital room?: A Lot   Help from Another: Climbing 3-5 steps with a railing?: Total     AM-PAC Score:  Raw Score: 11   Approx Degree of Impairment: 72.57%   Standardized Score (AM-PAC Scale): 33.86   CMS Modifier (G-Code): CL    FUNCTIONAL ABILITY STATUS  Functional Mobility/Gait Assessment  Gait Assistance: Not tested  Distance (ft): SPT  Assistive Device: None (holding onto therapist)  Pattern: Shuffle  Supine to Sit: maximum assist. Patient tolerated static sitting EOB for approx 10 minutes with intermittent BUE support and Min A>CGA in order to maintain static sitting balance. While  attempting to perform dynamic BLE therex, patient required Min>Mod A in order to maintain dynamic sitting balance.  SPT bed>chair: moderate assist x 2 with gait belt    Additional information: Patient with increased alertness this session compared to last PT session. Patient able to follow all commands with increased time and cues. Patient motivated to progress mobility and transfer to bedside chair this session.     The patient's Approx Degree of Impairment: 72.57% has been calculated based on documentation in the [facility] '6 clicks' Inpatient Daily Activity Short Form.  Research supports that patients with this level of impairment may benefit from rehab.  Final disposition will be made by interdisciplinary medical team.    THERAPEUTIC EXERCISES  Lower Extremity Ankle pumps  LAQ     Position Sitting       Patient in bed upon arrival. RN approved activity. Educated patient on POC and benefits of mobilization. Agreeable to participate. Patient reporting no pain.   Patient End of Session: Up in chair;Needs met;Call light within reach;RN aware of session/findings;All patient questions and concerns addressed    CURRENT GOALS   Goals to be met by: 2/17/24  Patient Goal Patient's self-stated goal is: return to PLOF   Goal #1 Patient is able to demonstrate supine - sit EOB @ level: CGA      Goal #1   Current Status Max A   Goal #2 Patient is able to demonstrate transfers EOB to/from Chair/Wheelchair at assistance level: minimum assistance with walker - rolling      Goal #2  Current Status  Mod A x 2 SPT bed>chair   Goal #3 Patient is able to ambulate 25 feet with assist device: walker - rolling at assistance level: minimum assistance   Goal #3   Current Status  NT   Goal #4 Patient to demonstrate independence with home activity/exercise instructions provided to patient in preparation for discharge.   Goal #4   Current Status  Ongoing     Therapeutic Activity: 25 minutes                  Occupational Therapy Notes (last 72  hours)      Occupational Therapy Note signed by Tara Magana OT at 2/16/2024  6:18 PM  Version 1 of 1    Author: Tara Magana OT Service: — Author Type: Occupational Therapist    Filed: 2/16/2024  6:18 PM Date of Service: 2/16/2024  5:05 PM Status: Signed    : Tara Magana OT (Occupational Therapist)       OCCUPATIONAL THERAPY TREATMENT NOTE - INPATIENT        Room Number: 309/309-A     Presenting Problem: acute on chronic CHF    Problem List  Principal Problem:    Acute on chronic congestive heart failure, unspecified heart failure type (HCC)  Active Problems:    Pancytopenia (HCC)    Goals of care, counseling/discussion    Advance care planning    Palliative care by specialist    Acute deep vein thrombosis (DVT) of left upper extremity (HCC)    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HCC)    Cardiogenic shock ([facility])    HFrEF (heart failure with reduced ejection fraction) ([facility])    Anemia      OCCUPATIONAL THERAPY ASSESSMENT   Patient demonstrates fair progress this session, goals remain in progress.    Patient continues to function below baseline with ADLs, functional transfers, dynamic standing and sitting balance, decreased strength, ROM and activity tolerance.   Contributing factors to remaining limitations include decreased functional strength, decreased endurance, pain, impaired   balance, decreased muscular endurance, and limited   ROM.  Next session anticipate patient to progress bed mobility, transfers, dynamic sitting balance, static standing balance, dynamic standing balance, maintaining seated position, and aerobic capacity.  Occupational Therapy will continue to follow patient for duration of hospitalization.    Patient continues to benefit from continued skilled OT services: to promote return to prior level of function and safety with continuous assistance and gradual rehabilitative therapy .     PLAN  OT Treatment Plan: Balance activities;Energy conservation/work simplification techniques;ADL  training;Functional transfer training;UE strengthening/ROM;Endurance training;Patient/Family education;Patient/Family training;Equipment eval/education;Compensatory technique education  OT Device Recommendations: Shower chair; Raised toilet seat (built up utensils)    SUBJECTIVE  \"I can try\"- sitting EOB    OBJECTIVE  Precautions: Bed/chair alarm    WEIGHT BEARING RESTRICTION     PAIN ASSESSMENT  Rating: Unable to rate  Location: neck pain  Management Techniques: Repositioning; Other (Comment) (towel roll)      ACTIVITIES OF DAILY LIVING ASSESSMENT  AM-PAC ‘6-Clicks’ Inpatient Daily Activity Short Form  How much help from another person does the patient currently need…  -   Putting on and taking off regular lower body clothing?: Total  -   Bathing (including washing, rinsing, drying)?: A Lot  -   Toileting, which includes using toilet, bedpan or urinal? : Total  -   Putting on and taking off regular upper body clothing?: A Lot  -   Taking care of personal grooming such as brushing teeth?: A Lot  -   Eating meals?: A Lot    AM-PAC Score:  Score: 10  Approx Degree of Impairment: 74.7%  Standardized Score (AM-PAC Scale): 27.31  CMS Modifier (G-Code): CL      THERAPEUTIC EXERCISE     Skilled Therapy Provided: Received supine in bed asleep. RN approved session. Pt agreeable to session, assisted with LB dressing and toileting supine with max-total A. Pt transferred to sitting at EOB with max A. Sat at EOB with mod A and intermittent min A for balance during seated exercises. Pt able to sit for ~15 min with min-mod A for balance, cues to enhance balance while seated EOB. Pt participated in BLE exercises 2x10 reps ankle pumps, kicks and 2x5 reps seated marched to enhance LE strength needed in prep for functional transfers. Intermittent seated rest breaks required. Pt assisted back to supine with max A and rolled with max A for pillow placement for pressure redistribution. Performed BUE exercises shoulder flexion x10 reps,  elbow flexion x10 reps. Pt left supine with all needs met, family and PCT present.    EDUCATION PROVIDED  Patient: Role of Occupational Therapy; Posture/Positioning; Compensatory ADL Techniques; UE HEP for ROM; UE HEP for Strengthening  Patient's Response to Education: Verbalized Understanding; Returned Demonstration; Requires Further Education    The patient's Approx Degree of Impairment: 74.7% has been calculated based on documentation in the [facility] '6 clicks' Inpatient Daily Activity Short Form.  Research supports that patients with this level of impairment may benefit from rehab.  Final disposition will be made by interdisciplinary medical team.    Patient End of Session: In bed;Needs met;Call light within reach;RN aware of session/findings;All patient questions and concerns addressed;Alarm set;Family present    OT Goals:     Patient will complete functional transfer with min a   Comment: ongoing    Patient will complete toileting with mod a   Comment: ongoing    Patient will tolerate standing for 1-2 minutes in prep for adls with min a   Comment: ongoing              OT Session Time: 30 minutes    Therapeutic Activity: 15 minutes  Therapeutic Exercise: 15 minutes    MAKSIM Plascencia/KVNG         Occupational Therapy Note signed by Abigail Alonso OT at 2/15/2024  4:59 PM  Version 1 of 1    Author: Abigail Alonso OT Service: Rehab Author Type: Occupational Therapist    Filed: 2/15/2024  4:59 PM Date of Service: 2/15/2024  3:30 PM Status: Signed    : Abigail Alonso OT (Occupational Therapist)       OCCUPATIONAL THERAPY TREATMENT NOTE - INPATIENT        Room Number: 309/309-A     Presenting Problem: acute on chronic CHF    Problem List  Principal Problem:    Acute on chronic congestive heart failure, unspecified heart failure type (HCC)  Active Problems:    Pancytopenia (HCC)    Goals of care, counseling/discussion    Advance care planning    Palliative care by specialist    Acute  deep vein thrombosis (DVT) of left upper extremity (HCC)    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HCC)    Cardiogenic shock (HCC)    HFrEF (heart failure with reduced ejection fraction) ([facility])    Anemia      OCCUPATIONAL THERAPY ASSESSMENT   Patient demonstrates fair progress this session, goals remain in progress.    Patient continues to function below baseline with toileting, bathing, upper body dressing, lower body dressing, grooming, eating, transfers, stating sitting balance, dynamic sitting balance, and static standing balance.   Contributing factors to remaining limitations include decreased functional strength, decreased functional reach, decreased endurance, impaired sitting and standing balance, and decreased muscular endurance.  Next session anticipate patient to progress lower body dressing, grooming, bed mobility, and transfers.  Occupational Therapy will continue to follow patient for duration of hospitalization.    Patient continues to benefit from continued skilled OT services: to promote return to prior level of function and safety with continuous assistance and gradual rehabilitative therapy .     PLAN  OT Treatment Plan: Balance activities;Energy conservation/work simplification techniques;ADL training;Functional transfer training;UE strengthening/ROM;Endurance training;Patient/Family education;Patient/Family training;Equipment eval/education;Compensatory technique education  OT Device Recommendations: Shower chair; Raised toilet seat (built up utensils)    SUBJECTIVE  \"I was just thinking about wanting to get out of bed\"     OBJECTIVE  Precautions: Bed/chair alarm    WEIGHT BEARING RESTRICTION     PAIN ASSESSMENT  Ratin  Location: L UE  Management Techniques: Activity promotion; Repositioning; Nurse notified    ACTIVITY TOLERANCE  Pulse: 63 (66 w/ activity)        BP: 143/78 (supine; 134/91 seated eob -- mild dizziness , cues for visual stabilization)[id number]             O2 SATURATIONS  Oxygen Therapy  SPO2%  on Room Air at Rest: 95  SPO2% Ambulation on Room Air: 95    ACTIVITIES OF DAILY LIVING ASSESSMENT  AM-PAC ‘6-Clicks’ Inpatient Daily Activity Short Form  How much help from another person does the patient currently need…  -   Putting on and taking off regular lower body clothing?: Total  -   Bathing (including washing, rinsing, drying)?: A Lot  -   Toileting, which includes using toilet, bedpan or urinal? : Total  -   Putting on and taking off regular upper body clothing?: A Lot  -   Taking care of personal grooming such as brushing teeth?: A Lot  -   Eating meals?: A Lot    AM-PAC Score:  Score: 10  Approx Degree of Impairment: 74.7%  Standardized Score (AM-PAC Scale): 27.31  CMS Modifier (G-Code): CL    FUNCTIONAL TRANSFER ASSESSMENT  Sit to Stand: Edge of Bed  Edge of Bed: Moderate Assist (x2; SPT to bedside chair --simulated commode t/f)    BED MOBILITY  Rolling: Maximum Assist  Supine to Sit : Maximum Assist    BALANCE ASSESSMENT  Static Sitting: Contact Guard Assist    FUNCTIONAL ADL ASSESSMENT  Eating: Not Tested  Grooming Seated: Minimal Assist (sitting balance)  Bathing Seated: Maximum Assist  UB Dressing Seated: Maximum Assist  LB Dressing Seated: Dependent  Toileting Seated: Dependent    Skilled Therapy Provided:   RN cleared pt for participation in occupational therapy session, which was completed in collaboration with physical therapy. Upon arrival, pt was supine in bed and agreeable to activity. No family was present during session. Pt benefited from additional time, verbal cues to maximize participation.    Pt with good progress towards IP OT goals. Pt with increased alertness this session, followed all cues. Pt completed supine>sit with Mod a x 2, cues for sequencing and reaching for bed rail. Pt maintained sitting while managing light hair grooming and seated therex between Min-Mod A (posterior lean with dual task). With hands on lap, pt maintained static sitting with close CGA. Pt demonstrated  simulated commode t/f with Mod A x 2, continued cues for transitional movements. Pt benefited from Max A for management of dynamic ADLs -- LE dressing and toileting.     EDUCATION PROVIDED  Patient: Plan of Care; Role of Occupational Therapy; Discharge Recommendations; Functional Transfer Techniques; Fall Prevention; Posture/Positioning; Proper Body Mechanics  Patient's Response to Education: Verbalized Understanding; Returned Demonstration; Requires Further Education      Patient End of Session: Up in chair;Needs met;Call light within reach;RN aware of session/findings    OT Goals:  Patient's self stated goal is: did not state       Patient will complete functional transfer with min a   Comment: ongoing    Patient will complete toileting with mod a   Comment: ongoing    Patient will tolerate standing for 1-2 minutes in prep for adls with min a   Comment: ongoing            Goals  on:   Frequency: 3x week    OT Session Time: 25 minutes  Self-Care Home Management: 10 minutes  Therapeutic Activity: 15 minutes             Video Swallow Study Notes    No notes of this type exist for this encounter.        SLP Note - SLP Notes      SLP Note signed by Rena Shaw SLP at 2024 12:42 PM  Version 1 of 1    Author: Rena Shaw SLP Service: Rehab Author Type: SPEECH-LANGUAGE PATHOLOGIST    Filed: 2024 12:42 PM Date of Service: 2024 10:33 AM Status: Signed    : Rena Shaw SLP (SPEECH-LANGUAGE PATHOLOGIST)       SPEECH DAILY NOTE - INPATIENT    ASSESSMENT & PLAN   ASSESSMENT    Proper PPE worn. Hands sanitized upon entrance/exit Pt room.       Pt alert, afebrile and on room air. Pt seen for swallow analysis per BSE recommendations (after consulting with RN). Pt agreed to participate. Pt's preferred method of learning verbal or demonstration. Pt upright in chair; observed with current diet of solid/thin liquids for monitoring diet tolerance. Swallowing  precautions/strategies discussed; Pt able to self-cue for use. Functional bite reflex/mastication/lingual skills on solids. No significant oral retention. Pharyngeal response appeared to trigger within 1-2 sec per hyolaryngeal elevation to completion (functional rise/strength per palpation). No overt CSA on solid nor thin liquids (no coughing, no throat clearing, clear vocal quality and no SOB). Collaborated with RN regarding Pt's swallowing plan of care. Per RN, Pt with good tolerance of current diet. No report of difficulty taking meds. No newt CXR completed. Sp02 ~99% during this session. Call light within Pt's reach upon SLP discharge from room.      CXR 2/05/24:  CONCLUSION:    Stable cardiomegaly with mild vascular congestion.      Mild increased opacification of the right lung base which may reflect small effusion and atelectasis and or pneumonia.      Small left pleural effusion.       PLAN: Pt is discharged from skilled swallowing intervention secondary to functional swallowing skills on least restrictive. IF MD desires to r/o SILENT aspiration, VFSS should be ordered.          Diet Recommendations - Solids: Regular  Diet Recommendations - Liquids: Thin Liquids    Compensatory Strategies Recommended: Slow rate  Aspiration Precautions: Upright position;Slow rate  Medication Administration Recommendations:  (as tolerated)    Patient Experiencing Pain: No  Treatment Plan  Treatment Plan/Recommendations: Aspiration precautions  Interdisciplinary Communication: Discussed with RN    GOALS  Goal #1 The patient will tolerate regular consistency and thin liquids without overt signs or symptoms of aspiration with 100 % accuracy over 1-2 session(s).    No CSA on current diet.       GOAL MET      Goal #2 The patient/family/caregiver will demonstrate understanding and implementation of aspiration precautions and swallow strategies independently over 1-2 session(s).    Pt self-cued for execution of strategies.    GOAL  MET     FOLLOW UP  Follow Up Needed (Documentation Required): No  SLP Follow-up Date: 02/08/24  Number of Visits to Meet Established Goals:  (1-2)    Session: 1    If you have any questions, please contact   Rena Shaw M.S. CCC/SLP  Speech-Language Pathologist  [facility]  #[id number]      Electronically signed by Rena Shaw, SLP on 2/8/2024 12:42 PM           Immunizations     Name Date      Covid-19 Pfizer 07/14/22     Covid-19 Pfizer 10/20/21     Covid-19 Pfizer 03/09/21     Covid-19 Pfizer 02/11/21     Covid-19 Pfizer Bivalent 11/15/22     INFLUENZA 11/15/22     Pneumococcal Vaccine (Prevnar 20) 04/18/23     Zoster Vaccine Recombinant Adjuvanted (Shingrix) 07/14/22       Multidisciplinary Problems     Active Goals        Problem: Patient/Family Goals    Goal Priority Disciplines Outcome Interventions   Patient/Family Long Term Goal     Interdisciplinary Progressing    Description: Patient's Long Term Goal: To get better    Interventions:  - IV diuretics, prn O2 supplementation, cardiac monitoring and electrolyte protocol  - See additional Care Plan goals for specific interventions   Patient/Family Short Term Goal     Interdisciplinary Progressing    Description: Patient's Short Term Goal: To breathe better    Interventions:   - IV diuretics, O2 supplementation prn, cardiac monitoring, electrolyte protocol  - See additional Care Plan goals for specific interventions               Discharge Treatment Preferences    Flowsheet Row Most Recent Value   Preferences    PASRR Level 1 Submitted Yes

## (undated) NOTE — LETTER
[place] [address]  Authorization for Invasive Procedures  Date: 7/16/2024          Time: 2:00 PM    I hereby authorize Dr. Liao, my physician and his/her assistants (if applicable), which may include medical students, residents, and/or fellows, to perform the following surgical operation/ procedure and administer such anesthesia as may be determined necessary by my physician: large pleural effusion on Margaret Silverio  2.   I recognize that during the surgical operation/procedure, unforeseen conditions may necessitate additional or different procedures than those listed above.  I, therefore, further authorize and request that the above-named surgeon, assistants, or designees perform such procedures as are, in their judgment, necessary and desirable.    3.   My surgeon/physician has discussed prior to my surgery the potential benefits, risks and side effects of this procedure; the likelihood of achieving goals; and potential problems that might occur during recuperation.  They also discussed reasonable alternatives to the procedure, including risks, benefits, and side effects related to the alternatives and risks related to not receiving this procedure.  I have had all my questions answered and I acknowledge that no guarantee has been made as to the result that may be obtained.    4.   Should the need arise during my operation/procedure, which includes change of level of care prior to discharge, I also consent to the administration of blood and/or blood products.  Further, I understand that despite careful testing and screening of blood or blood products by collecting agencies, I may still be subject to ill effects as a result of receiving a blood transfusion and/or blood products.  The following are some, but not all, of the potential risks that can occur: fever and allergic reactions, hemolytic reactions, transmission of diseases such as Hepatitis, AIDS and Cytomegalovirus (CMV)  and fluid overload.  In the event that I wish to have an autologous transfusion of my own blood, or a directed donor transfusion, I will discuss this with my physician.   Check only if Refusing Blood or Blood Products  I understand refusal of blood or blood products as deemed necessary by my physician may have serious consequences to my condition to include possible death. I hereby assume responsibility for my refusal and release the hospital, its personnel, and my physicians from any responsibility for the consequences of my refusal.         o  Refuse         5.   I authorize the use of any specimen, organs, tissues, body parts or foreign objects that may be removed from my body during the operation/procedure for diagnosis, research or teaching purposes and their subsequent disposal by hospital authorities.  I also authorize the release of specimen test results and/or written reports to my treating physician on the hospital medical staff or other referring or consulting physicians involved in my care, at the discretion of the Pathologist or my treating physician.    6.   I consent to the photographing or videotaping of the operations or procedures to be performed, including appropriate portions of my body for medical, scientific, or educational purposes, provided my identity is not revealed by the pictures or by descriptive texts accompanying them.  If the procedure has been photographed/videotaped, the surgeon will obtain the original picture, image, videotape or CD.  The hospital will not be responsible for storage, release or maintenance of the picture, image, tape or CD.    7.   I consent to the presence of a  or observers in the operating room as deemed necessary by my physician or their designees.    8.   I recognize that in the event my procedure results in extended X-Ray/fluoroscopy time, I may develop a skin reaction.    9. If I have a Do Not Attempt Resuscitation (DNAR) order in place,  that status will be suspended while in the operating room, procedural suite, and during the recovery period unless otherwise explicitly stated by me (or a person authorized to consent on my behalf). The surgeon or my attending physician will determine when the applicable recovery period ends for purposes of reinstating the DNAR order.  10. Patients having a sterilization procedure: I understand that if the procedure is successful the results will be permanent and it will therefore be impossible for me to inseminate, conceive, or bear children.  I also understand that the procedure is intended to result in sterility, although the result has not been guaranteed.   11. I acknowledge that my physician has explained sedation/analgesia administration to me including the risk and benefits I consent to the administration of sedation/analgesia as may be necessary or desirable in the judgment of my physician.    I CERTIFY THAT I HAVE READ AND FULLY UNDERSTAND THE ABOVE CONSENT TO OPERATION and/or OTHER PROCEDURE.        ____________________________________       _________________________________      ______________________________  Signature of Patient         Signature of Responsible Person        Printed Name of Responsible Person        ____________________________________      _________________________________      ______________________________       Signature of Witness          Relationship to Patient                       Date                                       Time  Patient Name: Margaret Silverio  : 3/14/1945    Reviewed: 2024   Printed: 2024  Medical Record #: [id number] Page 1 of 2             STATEMENT OF PHYSICIAN My signature below affirms that prior to the time of the procedure; I have explained to the patient and/or his/her legal representative, the risks and benefits involved in the proposed treatment and any reasonable alternative to the proposed treatment. I have also explained the  risks and benefits involved in refusal of the proposed treatment and alternatives to the proposed treatment and have answered the patient's questions. If I have a significant financial interest in a co-management agreement or a significant financial interest in any product or implant, or other significant relationship used in this procedure/surgery, I have disclosed this and had a discussion with my patient.     _______________________________________________________________ _____________________________  (Signature of Physician)                                                                                         (Date)                                   (Time)  Patient Name: Margaret Silverio  : 3/14/1945    Reviewed: 2024   Printed: 2024  Medical Record #: [id number] Page 2 of 2

## (undated) NOTE — LETTER
[facility]  Administration of Anesthesia  I, Margaret Silverio agree to be cared for by a physician anesthesiologist alone and/or with a nurse anesthetist, who is specially trained to monitor me and give me medicine to put me to sleep or keep me comfortable during my procedure    I understand that my anesthesiologist and/or anesthetist is not an employee or agent of [facility] or [employer]. He or she works for [facility]    As the patient asking for anesthesia services, I agree to:  Allow the anesthesiologist (anesthesia doctor) to give me medicine and do additional procedures as necessary. Some examples are: Starting or using an “IV” to give me medicine, fluids or blood during my procedure, and having a breathing tube placed to help me breathe when I’m asleep (intubation). In the event that my heart stops working properly, I understand that my anesthesiologist will make every effort to sustain my life, unless otherwise directed by [facility] Do Not Resuscitate documents.  Tell my anesthesia doctor before my procedure:  If I am pregnant.  The last time that I ate or drank.  iii. All of the medicines I take (including prescriptions, herbal supplements, and pills I can buy without a prescription (including street drugs/illegal medications). Failure to inform my anesthesiologist about these medicines may increase my risk of anesthetic complications.  iv.If I am allergic to anything or have had a reaction to anesthesia before.  I understand how the anesthesia medicine will help me (benefits).  I understand that with any type of anesthesia medicine there are risks:  The most common risks are: nausea, vomiting, sore throat, muscle soreness, damage to my eyes, mouth, or teeth (from breathing tube placement).  Rare risks include: remembering what happened during my procedure, allergic reactions to medications, injury to my airway, heart, lungs, vision, nerves, or  muscles and in extremely rare instances death.  My doctor has explained to me other choices available to me for my care (alternatives).  Pregnant Patients (“epidural”):  I understand that the risks of having an epidural (medicine given into my back to help control pain during labor), include itching, low blood pressure, difficulty urinating, headache or slowing of the baby’s heart. Very rare risks include infection, bleeding, seizure, irregular heart rhythms and nerve injury.  Regional Anesthesia (“spinal”, “epidural”, & “nerve blocks”):  I understand that rare but potential complications include headache, bleeding, infection, seizure, irregular heart rhythms, and nerve injury.    _____________________________________________________________________________  Patient (or Representative) Signature/Relationship to Patient  Date   Time    _____________________________________________________________________________   Name (if used)    Language/Organization   Time    _____________________________________________________________________________  Nurse Anesthetist Signature     Date   Time  _____________________________________________________________________________  Anesthesiologist Signature     Date   Time  I have discussed the procedure and information above with the patient (or patient’s representative) and answered their questions. The patient or their representative has agreed to have anesthesia services.    _____________________________________________________________________________  Witness        Date   Time  I have verified that the signature is that of the patient or patient’s representative, and that it was signed before the procedure  Patient Name: Margaret Silverio     : 3/14/1945                 Printed: 2024 at 7:42 AM    Medical Record #: [id number]                                            Page 1 of 1  ----------ANESTHESIA CONSENT----------

## (undated) NOTE — LETTER
[facility]  [address] [facility] [address]  [phone]    Blood Transfusion Consent    In the course of your treatment, it may become necessary to administer a transfusion of blood or blood components. This form provides basic information concerning this procedure and, if signed by you, authorizes its administration. By signing this form, you agree that all of your questions about the administration of blood or blood products have been answered by the ordering medical professional or designee.    Description of Procedure  Blood is introduced into one of your veins, commonly in the arm, using a sterilized disposable needle. The amount of blood transfused, and whether the transfusion will be of blood or blood components is a judgement the physician will make based on your particular needs.    Risks  The transfusion is a common procedure of low risk.  MINOR AND TEMPORARY REACTIONS ARE NOT UNCOMMON, including a slight bruise, swelling or local reaction in the area where the needle pierces your skin, or a nonserious reaction to the transfused material itself, including headache, fever or mild skin reaction, such as rash.  Serious reactions are possible, though very unlikely, and include severe allergic reaction (shock) and destruction (hemolysis) of transfused blood cells.  Infectious diseases which are known to be transmitted by blood transfusion include certain types of viral Hepatitis(liver infection from a virus), Human Immunodeficiency Virus (HIV-1,2) infection, a viral infection known to cause Acquired Immunodeficiency Syndrome (AIDS), as well as certain other bacterial, viral, and parasitic diseases. While a minimal risk of acquiring an infectious disease from transfused blood exists, in accordance with the Federal and State law, all due care has been taken in donor selection and testing to avoid transmission of disease.    Alternatives  If loss of blood poses serious threats during your  treatment, THERE IS NO EFFECTIVE ALTERNATIVE TO BLOOD TRANSFUSION. However, if you have any further questions on this matter, your provider will fully explain the alternatives to you if it has not already been done.    I, ______________________________, have read/had read to me the above. I understand the matters bearing on the decision whether or not to authorize a transfusion of blood or blood components. I have no questions which have not been answered to my full satisfaction. I hereby consent to such transfusion as my physician may deem necessary or advisable in the course of my treatment.    ______________________________________________                    ___________________________  (Signature of Patient or Responsible party in case of minor,                 (Printed Name of Patient or incompetent, or unconscious patient)              Responsible Party)    ___________________________               _____________________  (Relationship to Patient if not self)                                    (Date and Time)    __________________________                                                           ______________________              (Signature of Witness)               (Printed Name of Witness)     Language line ()    Telephone/Verbal/Video Consent    __________________________                     ____________________  (Signature of 2nd Witness           (Printed Name of 2nd  Telephone/Verbal/Video Consent)           Witness)    Patient Name: Margaret Silverio     : 3/14/1945                 Printed: May 28, 2024     Medical Record #: [id number]      Rev: 2023

## (undated) NOTE — LETTER
URGENT REQUEST PER DR. LEVY. STAT RESPONSE NEEDED.       23        To Whom It May Concern:      Ava Gagnon  :  3/14/1945    []  Patient has been cleared to hold Eliquis for 2 days prior to Left Cervical C3-4, C4-5, C5-6 Facet joint injection under fluoroscopy guidance under local.  Holding the medication(s) may put the patient at an increased risk for stroke, heart attack, or neurologic or cardiac events. []  Patient has not been cleared to hold Eliquis for procedure. If this office may be of further assistance, please do not hesitate to contact us. Sincerely,    Daphne Escobedo Ly: [address] [facility]  F: [phone]

## (undated) NOTE — LETTER
Consent for Coronary CT Angiography    Date of Procedure: 1/17/2023    * CTA GATED PULMONARY VEINS WO CALCIUM SCORE * on Margaret Silverio    Your doctor has recommended that you have a Coronary CT Angiography procedure. Coronary CT Angiography is a diagnostic procedure using computed tomography to scan the chest and coronary arteries. It is a non-invasive technique that allows clear visualization of narrowed and blocked arteries. Blockage in these arteries may lead to heart attack or stroke.    You will lie on a table that slides slowly into a large circular opening called the CT gantry. The procedure will be performed by the CT Staff, including a nurse, a technologist, and a patient assistant. The staff will be with you throughout the entire procedure to explain each step, make you as comfortable as possible, and answer all of your questions. The staff will take a series of images of your heart and chest to help define the area to be imaged. You will be asked to hold your breath for a short time as each series of images is performed and acquired.     You may receive medication called a beta blocker that will slow your hear rate down. This is needed so we can obtain better images of your heart. You may also receive a nitroglycerine spray under your tongue. This medication is given to dilate the arteries for better picture quality. The nitroglycerine may cause a drop in blood pressure. During the procedure you will receive an injection of a contrast material, which assists the physician in highlighting the anatomy of your heart. You may experience a warm sensation through your body and a metallic taste in your mouth. In rare circumstances, a rash and/or hives may occur. It's very important to inform your care giver of any changes you may feel or experience.     The medication and the contrast material used as part of your procedure are all deemed very safe, however there is always an element of risk to a patient  when taking medication. Allergic reactions to medication can range from very minor to very serious, leading to a life threatening situation or even death. Please be sure to communicate any allergy you may have to your caregiver immediately.    The information that is obtained during your testing will be treated as privileged and confidential. The information obtained will be given to your doctor and may be used for insurance purposes or to track and trend data as part of  with your privacy retained.     I, Margaret Jonny Silverio, have read and understand the above information. I voluntarily agree and consent to have the Coronary CT Angiography (CTA) Exam. Furthermore, no guarantees or assurances have been given by anyone as to the results that may be obtained from this procedure. Any questions that may have occurred to me have been answered to my satisfaction.    Signature of Patient: __________________________Date: ___________Time: _______      Patient Name: Margaret Warrenler    : 3/14/1945      Printed: 2024      Medical Record #: [id number]

## (undated) NOTE — LETTER
[facility] EMERGENCY DEPARTMENT  [address] [facility] [address]  [facility] [address]  [phone]    Blood Transfusion Consent    In the course of your treatment, it may become necessary to administer a transfusion of blood or blood components. This form provides basic information concerning this procedure and, if signed by you, authorizes its administration. By signing this form, you agree that all of your questions about the administration of blood or blood products have been answered by the ordering medical professional or designee.    Description of Procedure  Blood is introduced into one of your veins, commonly in the arm, using a sterilized disposable needle. The amount of blood transfused, and whether the transfusion will be of blood or blood components is a judgement the physician will make based on your particular needs.    Risks  The transfusion is a common procedure of low risk.  MINOR AND TEMPORARY REACTIONS ARE NOT UNCOMMON, including a slight bruise, swelling or local reaction in the area where the needle pierces your skin, or a nonserious reaction to the transfused material itself, including headache, fever or mild skin reaction, such as rash.  Serious reactions are possible, though very unlikely, and include severe allergic reaction (shock) and destruction (hemolysis) of transfused blood cells.  Infectious diseases which are known to be transmitted by blood transfusion include certain types of viral Hepatitis(liver infection from a virus), Human Immunodeficiency Virus (HIV-1,2) infection, a viral infection known to cause Acquired Immunodeficiency Syndrome (AIDS), as well as certain other bacterial, viral, and parasitic diseases. While a minimal risk of acquiring an infectious disease from transfused blood exists, in accordance with the Federal and State law, all due care has been taken in donor selection and testing to avoid transmission of disease.    Alternatives  If loss of blood poses serious threats  during your treatment, THERE IS NO EFFECTIVE ALTERNATIVE TO BLOOD TRANSFUSION. However, if you have any further questions on this matter, your provider will fully explain the alternatives to you if it has not already been done.    I, ______________________________, have read/had read to me the above. I understand the matters bearing on the decision whether or not to authorize a transfusion of blood or blood components. I have no questions which have not been answered to my full satisfaction. I hereby consent to such transfusion as my physician may deem necessary or advisable in the course of my treatment.    ______________________________________________                    ___________________________  (Signature of Patient or Responsible party in case of minor,                 (Printed Name of Patient or incompetent, or unconscious patient)              Responsible Party)    ___________________________               _____________________  (Relationship to Patient if not self)                                    (Date and Time)    __________________________                                                           ______________________              (Signature of Witness)               (Printed Name of Witness)     Language line ()    Telephone/Verbal/Video Consent    __________________________                     ____________________  (Signature of 2nd Witness           (Printed Name of 2nd  Telephone/Verbal/Video Consent)           Witness)    Patient Name: Margaret Silverio     : 3/14/1945                 Printed: 2024     Medical Record #: [id number]      Rev: 2023

## (undated) NOTE — LETTER
1/25/2024              Margaret Silverio        [address] [facility] [address]         Dear Margaret,    This letter is to inform you that our office has made several attempts to reach you by phone without success.  We were attempting to contact you by phone regarding an illness or problem.    Please contact our office at the number listed below as soon as you receive this letter to discuss this issue and to make the necessary changes in our system to your contact information.  Thank you for your cooperation.        Sincerely,    Johnathon Rodriguez, [address] [address]   [phone]        Document electronically generated by:  Kirti ROMERO RN

## (undated) NOTE — Clinical Note
FYI, TCM call made, see [facility] notes. [facility] Scheduled PCP office TCM appointment with patient on 6/29/2024 at 12:20 pm, first appointment available.

## (undated) NOTE — LETTER
[facility] 2W/SW  [address] [facility] [address]  [phone]    Blood Transfusion Consent    In the course of your treatment, it may become necessary to administer a transfusion of blood or blood components. This form provides basic information concerning this procedure and, if signed by you, authorizes its administration. By signing this form, you agree that all of your questions about the administration of blood or blood products have been answered by the ordering medical professional or designee.    Description of Procedure  Blood is introduced into one of your veins, commonly in the arm, using a sterilized disposable needle. The amount of blood transfused, and whether the transfusion will be of blood or blood components is a judgement the physician will make based on your particular needs.    Risks  The transfusion is a common procedure of low risk.  MINOR AND TEMPORARY REACTIONS ARE NOT UNCOMMON, including a slight bruise, swelling or local reaction in the area where the needle pierces your skin, or a nonserious reaction to the transfused material itself, including headache, fever or mild skin reaction, such as rash.  Serious reactions are possible, though very unlikely, and include severe allergic reaction (shock) and destruction (hemolysis) of transfused blood cells.  Infectious diseases which are known to be transmitted by blood transfusion include certain types of viral Hepatitis(liver infection from a virus), Human Immunodeficiency Virus (HIV-1,2) infection, a viral infection known to cause Acquired Immunodeficiency Syndrome (AIDS), as well as certain other bacterial, viral, and parasitic diseases. While a minimal risk of acquiring an infectious disease from transfused blood exists, in accordance with the Federal and State law, all due care has been taken in donor selection and testing to avoid transmission of disease.    Alternatives  If loss of blood poses serious threats during your  treatment, THERE IS NO EFFECTIVE ALTERNATIVE TO BLOOD TRANSFUSION. However, if you have any further questions on this matter, your provider will fully explain the alternatives to you if it has not already been done.    I, ______________________________, have read/had read to me the above. I understand the matters bearing on the decision whether or not to authorize a transfusion of blood or blood components. I have no questions which have not been answered to my full satisfaction. I hereby consent to such transfusion as my physician may deem necessary or advisable in the course of my treatment.    ______________________________________________                    ___________________________  (Signature of Patient or Responsible party in case of minor,                 (Printed Name of Patient or incompetent, or unconscious patient)              Responsible Party)    ___________________________               _____________________  (Relationship to Patient if not self)                                    (Date and Time)    __________________________                                                           ______________________              (Signature of Witness)               (Printed Name of Witness)     Language line ()    Telephone/Verbal/Video Consent    __________________________                     ____________________  (Signature of 2nd Witness           (Printed Name of 2nd  Telephone/Verbal/Video Consent)           Witness)    Patient Name: Margaret Silverio     : 3/14/1945                 Printed: 2024     Medical Record #: [id number]      Rev: 2023

## (undated) NOTE — LETTER
[facility] 3W/SW  [address] [facility] [address]  [phone]    Blood Transfusion Consent    In the course of your treatment, it may become necessary to administer a transfusion of blood or blood components. This form provides basic information concerning this procedure and, if signed by you, authorizes its administration. By signing this form, you agree that all of your questions about the administration of blood or blood products have been answered by the ordering medical professional or designee.    Description of Procedure  Blood is introduced into one of your veins, commonly in the arm, using a sterilized disposable needle. The amount of blood transfused, and whether the transfusion will be of blood or blood components is a judgement the physician will make based on your particular needs.    Risks  The transfusion is a common procedure of low risk.  MINOR AND TEMPORARY REACTIONS ARE NOT UNCOMMON, including a slight bruise, swelling or local reaction in the area where the needle pierces your skin, or a nonserious reaction to the transfused material itself, including headache, fever or mild skin reaction, such as rash.  Serious reactions are possible, though very unlikely, and include severe allergic reaction (shock) and destruction (hemolysis) of transfused blood cells.  Infectious diseases which are known to be transmitted by blood transfusion include certain types of viral Hepatitis(liver infection from a virus), Human Immunodeficiency Virus (HIV-1,2) infection, a viral infection known to cause Acquired Immunodeficiency Syndrome (AIDS), as well as certain other bacterial, viral, and parasitic diseases. While a minimal risk of acquiring an infectious disease from transfused blood exists, in accordance with the Federal and State law, all due care has been taken in donor selection and testing to avoid transmission of disease.    Alternatives  If loss of blood poses serious threats during your  treatment, THERE IS NO EFFECTIVE ALTERNATIVE TO BLOOD TRANSFUSION. However, if you have any further questions on this matter, your provider will fully explain the alternatives to you if it has not already been done.    I, ______________________________, have read/had read to me the above. I understand the matters bearing on the decision whether or not to authorize a transfusion of blood or blood components. I have no questions which have not been answered to my full satisfaction. I hereby consent to such transfusion as my physician may deem necessary or advisable in the course of my treatment.    ______________________________________________                    ___________________________  (Signature of Patient or Responsible party in case of minor,                 (Printed Name of Patient or incompetent, or unconscious patient)              Responsible Party)    ___________________________               _____________________  (Relationship to Patient if not self)                                    (Date and Time)    __________________________                                                           ______________________              (Signature of Witness)               (Printed Name of Witness)     Language line ()    Telephone/Verbal/Video Consent    __________________________                     ____________________  (Signature of 2nd Witness           (Printed Name of 2nd  Telephone/Verbal/Video Consent)           Witness)    Patient Name: Margaret Silverio     : 3/14/1945                 Printed: 2024     Medical Record #: [id number]      Rev: 2023

## (undated) NOTE — LETTER
[place], Aprn  [address] [address]       04/23/24        Patient: Margaret Silverio   YOB: 1945   Date of Visit: 4/23/2024       Dear  Dr. Michael DO,      Thank you for referring Margaret Silverio to my practice.  Please find my assessment and plan below.      As you know she is a 79-year-old female with a history of nonischemic cardiomyopathy with a left ventricular ejection fraction of 15 to 20% and grade 4 diastolic dysfunction, status post AICD, atrial fibrillation, status post bioprosthetic valve replacement, hypertension, anemia, and GI bleeds from AVMs who I now had the pleasure of seeing for evaluation of chronic kidney disease stage III.  She was seen during her recent hospitalization at [facility] in February 2024.  She presented with decompensated rheumatoid arthritis, congestive heart failure.  Required dobutamine and milrinone infusions along with diuretics.  Creatinine fluctuated from 1.06 to as high as 1.68.  Creatinine at time of discharge on February 2, 2024 was 1.16 with an estimated GFR 49 cc/min.  Hemoglobin was 10.9.  Patient subsequently went to rehab.  She is now back home.  Dr. Mcdonald ordered a CT angiogram of the pulmonary artery on April 11, 2024.  However was postponed as she had a point-of-care creatinine of 1.9.  Now here for follow-up.    Overall the patient states her energy seems to wax and wane.  Still gets some sense of dyspnea on exertion although seems comfortable at rest.  Still walking with a walker.  Appetite has been poor but now seems to be improving.  No chest pain, GI symptoms.  Does seem to have some problems with stress incontinence.  No true dysuria, fevers or chills.  Wants to see a rheumatologist here regarding her rheumatoid arthritis.  Social history she is a non-smoker.  Recently moved from [place] to live closer to her children.  Medications as listed but she states that Bumex was discontinued at the nursing  home.    On physical exam her blood pressure is 117/56 with a pulse of 60 and she weighed 126 pounds.  Her neck was supple without JVD.  Lungs are clear.  Heart revealed a regular rate and rhythm and S4 no gallops, murmurs or rubs.  Abdomen soft, flat, nontender without organomegaly, masses or bruits.  Extremities revealed no edema.    I informed the patient and her family that she probably has underlying chronic kidney disease stage III.  Her creatinine though was higher at 1.9 in April 11, 2024.  She states Bumex was discontinued when she was at the nursing home.  However remains on Entresto.  Patient is reluctant to push p.o. fluids secondary to urinary incontinence.  Otherwise on no other nephrotoxic medications.  She did have a CT scan done in January 2024 which showed a persistent bilateral nephrogram which could be secondary to ATN, hypotension or bilateral renal artery stenosis.  Creatinine is 1.3 on that date.    Encouraged the patient to increase her fluid intake.  She knows to avoid nonsteroidals.  Will have her repeat her CBC, renal panel, urine studies and connective tissue studies to rule out other causes.  May need a Doppler renal ultrasound depending upon the above results.  They understand that she is at risk for cardiorenal syndrome along with contrast-induced nephropathy.  Further impressions and recommendations will be forthcoming after reviewing the above.    Thank you very much for allowing me to participate in the care of your patient.  If you have any questions please feel free to call.           Sincerely,   Eyad Arvizu MD   [facility], [facility], [facility]  [address]  [facility] [address]    Document electronically generated by:  Eyad Arvizu MD

## (undated) NOTE — LETTER
24        Margaret Silverio  :  3/14/1945     []  Patient has been cleared to hold Eliquis for 2-3 days prior to Left C3-4, C4-5 and C5-6 medial branch blocks.  Holding the medication(s) may put the patient at an increased risk for stroke, heart attack, or neurologic or cardiac events.     []  Patient has NOT been cleared to hold Eliquis prior to procedure: C7-T1 Interlaminar Epidural Steroid Injection.           X_________________________________________  (Provider signature)                                     (Date)        Please make a selection, sign and fax this letter back to our office     If this office may be of further assistance, please do not hesitate to contact us.        Sincerely,  Felicitas Lee, DO    Phone #: [phone]  Fax #: [phone]

## (undated) NOTE — LETTER
[place] [address]  Authorization for Invasive Procedures  Date: 07/17/2024           Time: 1321    I hereby authorize Dr. Vaz, my physician and his/her assistants (if applicable), which may include medical students, residents, and/or fellows, to perform the following surgical operation/ procedure and administer such anesthesia as may be determined necessary by my physician: Right Heart Catheterization with Leave In [place] Cristian Catheter  on Margaret Jonny Silverio  2.   I recognize that during the surgical operation/procedure, unforeseen conditions may necessitate additional or different procedures than those listed above.  I, therefore, further authorize and request that the above-named surgeon, assistants, or designees perform such procedures as are, in their judgment, necessary and desirable.    3.   My surgeon/physician has discussed prior to my surgery the potential benefits, risks and side effects of this procedure; the likelihood of achieving goals; and potential problems that might occur during recuperation.  They also discussed reasonable alternatives to the procedure, including risks, benefits, and side effects related to the alternatives and risks related to not receiving this procedure.  I have had all my questions answered and I acknowledge that no guarantee has been made as to the result that may be obtained.    4.   Should the need arise during my operation/procedure, which includes change of level of care prior to discharge, I also consent to the administration of blood and/or blood products.  Further, I understand that despite careful testing and screening of blood or blood products by collecting agencies, I may still be subject to ill effects as a result of receiving a blood transfusion and/or blood products.  The following are some, but not all, of the potential risks that can occur: fever and allergic reactions, hemolytic reactions, transmission of diseases such as  Hepatitis, AIDS and Cytomegalovirus (CMV) and fluid overload.  In the event that I wish to have an autologous transfusion of my own blood, or a directed donor transfusion, I will discuss this with my physician.   Check only if Refusing Blood or Blood Products  I understand refusal of blood or blood products as deemed necessary by my physician may have serious consequences to my condition to include possible death. I hereby assume responsibility for my refusal and release the hospital, its personnel, and my physicians from any responsibility for the consequences of my refusal.         o  Refuse         5.   I authorize the use of any specimen, organs, tissues, body parts or foreign objects that may be removed from my body during the operation/procedure for diagnosis, research or teaching purposes and their subsequent disposal by hospital authorities.  I also authorize the release of specimen test results and/or written reports to my treating physician on the hospital medical staff or other referring or consulting physicians involved in my care, at the discretion of the Pathologist or my treating physician.    6.   I consent to the photographing or videotaping of the operations or procedures to be performed, including appropriate portions of my body for medical, scientific, or educational purposes, provided my identity is not revealed by the pictures or by descriptive texts accompanying them.  If the procedure has been photographed/videotaped, the surgeon will obtain the original picture, image, videotape or CD.  The hospital will not be responsible for storage, release or maintenance of the picture, image, tape or CD.    7.   I consent to the presence of a  or observers in the operating room as deemed necessary by my physician or their designees.    8.   I recognize that in the event my procedure results in extended X-Ray/fluoroscopy time, I may develop a skin reaction.    9. If I have a Do Not  Attempt Resuscitation (DNAR) order in place, that status will be suspended while in the operating room, procedural suite, and during the recovery period unless otherwise explicitly stated by me (or a person authorized to consent on my behalf). The surgeon or my attending physician will determine when the applicable recovery period ends for purposes of reinstating the DNAR order.  10. Patients having a sterilization procedure: I understand that if the procedure is successful the results will be permanent and it will therefore be impossible for me to inseminate, conceive, or bear children.  I also understand that the procedure is intended to result in sterility, although the result has not been guaranteed.   11. I acknowledge that my physician has explained sedation/analgesia administration to me including the risk and benefits I consent to the administration of sedation/analgesia as may be necessary or desirable in the judgment of my physician.    I CERTIFY THAT I HAVE READ AND FULLY UNDERSTAND THE ABOVE CONSENT TO OPERATION and/or OTHER PROCEDURE.        ____________________________________       _________________________________      ______________________________  Signature of Patient         Signature of Responsible Person        Printed Name of Responsible Person        ____________________________________      _________________________________      ______________________________       Signature of Witness          Relationship to Patient                       Date                                       Time  Patient Name: Margaret Silverio  : 3/14/1945    Reviewed: 2024   Printed: 2024  Medical Record #: [id number] Page 1 of 2             STATEMENT OF PHYSICIAN My signature below affirms that prior to the time of the procedure; I have explained to the patient and/or his/her legal representative, the risks and benefits involved in the proposed treatment and any reasonable alternative to the  proposed treatment. I have also explained the risks and benefits involved in refusal of the proposed treatment and alternatives to the proposed treatment and have answered the patient's questions. If I have a significant financial interest in a co-management agreement or a significant financial interest in any product or implant, or other significant relationship used in this procedure/surgery, I have disclosed this and had a discussion with my patient.     _______________________________________________________________ _____________________________  (Signature of Physician)                                                                                         (Date)                                   (Time)  Patient Name: Margaret Silverio  : 3/14/1945    Reviewed: 2024   Printed: 2024  Medical Record #: [id number] Page 2 of 2

## (undated) NOTE — Clinical Note
Spoke with pt today for TCM--please see notes. Sent TE to office staff for appt clarification and f/u. CCM referral placed--thank you.  Future Appointments 6/25/2024  2:40 PM    Johnathon Rodriguez, DO    [facility]

## (undated) NOTE — LETTER
No referring provider defined for this encounter.       06/25/24        Patient: Margaret Silverio   YOB: 1945   Date of Visit: 6/25/2024       Dear  Dr. Rodriguez DO,      Thank you for referring Margaret Silverio to my practice.  Please find my assessment and plan below.    As you know she is a 79-year-old female with a history of nonischemic cardiomyopathy with a left ventricular ejection fraction of 15 to 20% associated with grade 4 diastolic dysfunction, status post AICD, atrial fibrillation, status post bioprosthetic valve replacement, rheumatoid arthritis, hypertension, anemia, history of GI bleeds from AVMs who I now had the pleasure of seeing for follow-up of chronic kidney disease stage III.  The patient was rehospitalized in June 18 through June 21, 2024 for acute on chronic congestive heart failure.  Her creatinine at time of admission is 1.80.  It improved to 1.46 but then was 1.64 at time of discharge on June 21, 2024.  Currently patient states she is doing okay.  She is comfortable at rest but does have dyspnea on exertion.  No chest pain.  She is having chronic neck pain and may be getting some type of cortisone injection in her neck with Dr. Lee.  Uses Norco as needed for joint pains.    On physical exam her blood pressure 135/71 with a pulse of 60.  Her neck was supple without JVD.  Lungs were clear to auscultation percussion.  Heart revealed an irregular irregular rhythm.  Her abdomen was soft, flat, nontender without organomegaly, masses or bruits.  Extremities revealed no edema.    I told the patient and her  that overall her volume status appears to be reasonably acceptable.  She does have  a significant cardiomyopathy and will always be at risk for cardiorenal syndrome.  Currently on Bumex 2 mg daily.  Also on Entresto.  Reinforced low-salt diet.  Avoid nonsteroidals.  She is scheduled to see cardiology next week.  She will repeat a CBC and renal panel before  upcoming visit.  Will also check iron studies.  If acceptable will be a candidate for Aranesp.  If stable will continue quarterly.  Will see again in 3 to 6 months depending upon clinical course.    Thank you again for allowing me to participate in the care of your patient.  If you have any questions please feel free to call.               Sincerely,   Eyad Arvizu MD   [facility], [facility], [place]  [address]  [facility] [address]    Document electronically generated by:  Eyad Arvizu MD

## (undated) NOTE — LETTER
[facility] 4W/SW/SE  [address]  [facility] [address]  [phone]    Blood Transfusion Consent    In the course of your treatment, it may become necessary to administer a transfusion of blood or blood components. This form provides basic information concerning this procedure and, if signed by you, authorizes its administration. By signing this form, you agree that all of your questions about the administration of blood or blood products have been answered by the ordering medical professional or designee.    Description of Procedure  Blood is introduced into one of your veins, commonly in the arm, using a sterilized disposable needle. The amount of blood transfused, and whether the transfusion will be of blood or blood components is a judgement the physician will make based on your particular needs.    Risks  The transfusion is a common procedure of low risk.  MINOR AND TEMPORARY REACTIONS ARE NOT UNCOMMON, including a slight bruise, swelling or local reaction in the area where the needle pierces your skin, or a nonserious reaction to the transfused material itself, including headache, fever or mild skin reaction, such as rash.  Serious reactions are possible, though very unlikely, and include severe allergic reaction (shock) and destruction (hemolysis) of transfused blood cells.  Infectious diseases which are known to be transmitted by blood transfusion include certain types of viral Hepatitis(liver infection from a virus), Human Immunodeficiency Virus (HIV-1,2) infection, a viral infection known to cause Acquired Immunodeficiency Syndrome (AIDS), as well as certain other bacterial, viral, and parasitic diseases. While a minimal risk of acquiring an infectious disease from transfused blood exists, in accordance with the Federal and State law, all due care has been taken in donor selection and testing to avoid transmission of disease.    Alternatives  If loss of blood poses serious threats during your  treatment, THERE IS NO EFFECTIVE ALTERNATIVE TO BLOOD TRANSFUSION. However, if you have any further questions on this matter, your provider will fully explain the alternatives to you if it has not already been done.    I, ______________________________, have read/had read to me the above. I understand the matters bearing on the decision whether or not to authorize a transfusion of blood or blood components. I have no questions which have not been answered to my full satisfaction. I hereby consent to such transfusion as my physician may deem necessary or advisable in the course of my treatment.    ______________________________________________                    ___________________________  (Signature of Patient or Responsible party in case of minor,                 (Printed Name of Patient or incompetent, or unconscious patient)              Responsible Party)    ___________________________               _____________________  (Relationship to Patient if not self)                                    (Date and Time)    __________________________                                                           ______________________              (Signature of Witness)               (Printed Name of Witness)     Language line ()    Telephone/Verbal/Video Consent    __________________________                     ____________________  (Signature of 2nd Witness           (Printed Name of 2nd  Telephone/Verbal/Video Consent)           Witness)    Patient Name: Margaret Silverio     : 3/14/1945                 Printed: 2024     Medical Record #: [id number]      Rev: 2023

## (undated) NOTE — LETTER
24        To Whom It May Concern:      Margaret Jonny Silverio  :  3/14/1945    []  Patient has been cleared to hold Eliquis for 3 days prior to Left C3, C4, and C5 Medial branch block injection.  Holding the medication(s) may put the patient at an increased risk for stroke, heart attack, or neurologic or cardiac events.    []  Patient has NOT been cleared to hold Eliquis for procedure.        X_________________________________________  (Provider signature)                                     (Date)      Please make a selection, sign and fax this letter back to our office    If this office may be of further assistance, please do not hesitate to contact us.      Sincerely,    Felicitas Lee, DO    Phone #: [phone]  Fax #: [phone]

## (undated) NOTE — LETTER
[facility]  [address], [place], [place]  Authorization for Surgical Operation and Procedure                                                                                           I hereby authorize * Surgery not found *, my physician and his/her assistants (if applicable), which may include medical students, residents, and/or fellows, to perform the following surgical operation/ procedure and administer such anesthesia as may be determined necessary by my physician: Operation/Procedure name (s)  on Margaret Silverio   2.   I recognize that during the surgical operation/procedure, unforeseen conditions may necessitate additional or different procedures than those listed above.  I, therefore, further authorize and request that the above-named surgeon, assistants, or designees perform such procedures as are, in their judgment, necessary and desirable.    3.   My surgeon/physician has discussed prior to my surgery the potential benefits, risks and side effects of this procedure; the likelihood of achieving goals; and potential problems that might occur during recuperation.  They also discussed reasonable alternatives to the procedure, including risks, benefits, and side effects related to the alternatives and risks related to not receiving this procedure.  I have had all my questions answered and I acknowledge that no guarantee has been made as to the result that may be obtained.    4.   Should the need arise during my operation/procedure, which includes change of level of care prior to discharge, I also consent to the administration of blood and/or blood products.  Further, I understand that despite careful testing and screening of blood or blood products by collecting agencies, I may still be subject to ill effects as a result of receiving a blood transfusion and/or blood products.  The following are some, but not all, of the potential risks that can occur: fever and allergic reactions,  hemolytic reactions, transmission of diseases such as Hepatitis, AIDS and Cytomegalovirus (CMV) and fluid overload.  In the event that I wish to have an autologous transfusion of my own blood, or a directed donor transfusion, I will discuss this with my physician.  Check only if Refusing Blood or Blood Products  I understand refusal of blood or blood products as deemed necessary by my physician may have serious consequences to my condition to include possible death. I hereby assume responsibility for my refusal and release the hospital, its personnel, and my physicians from any responsibility for the consequences of my refusal.    o  Refuse   5.   I authorize the use of any specimen, organs, tissues, body parts or foreign objects that may be removed from my body during the operation/procedure for diagnosis, research or teaching purposes and their subsequent disposal by hospital authorities.  I also authorize the release of specimen test results and/or written reports to my treating physician on the hospital medical staff or other referring or consulting physicians involved in my care, at the discretion of the Pathologist or my treating physician.    6.   I consent to the photographing or videotaping of the operations or procedures to be performed, including appropriate portions of my body for medical, scientific, or educational purposes, provided my identity is not revealed by the pictures or by descriptive texts accompanying them.  If the procedure has been photographed/videotaped, the surgeon will obtain the original picture, image, videotape or CD.  The hospital will not be responsible for storage, release or maintenance of the picture, image, tape or CD.    7.   I consent to the presence of a  or observers in the operating room as deemed necessary by my physician or their designees.    8.   I recognize that in the event my procedure results in extended X-Ray/fluoroscopy time, I may develop a  skin reaction.    9. If I have a Do Not Attempt Resuscitation (DNAR) order in place, that status will be suspended while in the operating room, procedural suite, and during the recovery period unless otherwise explicitly stated by me (or a person authorized to consent on my behalf). The surgeon or my attending physician will determine when the applicable recovery period ends for purposes of reinstating the DNAR order.  10. Patients having a sterilization procedure: I understand that if the procedure is successful the results will be permanent and it will therefore be impossible for me to inseminate, conceive, or bear children.  I also understand that the procedure is intended to result in sterility, although the result has not been guaranteed.   11. I acknowledge that my physician has explained sedation/analgesia administration to me including the risk and benefits I consent to the administration of sedation/analgesia as may be necessary or desirable in the judgment of my physician.    I CERTIFY THAT I HAVE READ AND FULLY UNDERSTAND THE ABOVE CONSENT TO OPERATION and/or OTHER PROCEDURE.     _________________________________________ _________________________________     ___________________________________  Signature of Patient     Signature of Responsible Person                   Printed Name of Responsible Person                              _________________________________________ ______________________________        ___________________________________  Signature of Witness         Date  Time         Relationship to Patient    STATEMENT OF PHYSICIAN My signature below affirms that prior to the time of the procedure; I have explained to the patient and/or his/her legal representative, the risks and benefits involved in the proposed treatment and any reasonable alternative to the proposed treatment. I have also explained the risks and benefits involved in refusal of the proposed treatment and alternatives to the  proposed treatment and have answered the patient's questions. If I have a significant financial interest in a co-management agreement or a significant financial interest in any product or implant, or other significant relationship used in this procedure/surgery, I have disclosed this and had a discussion with my patient.     _______________________________________________________________ _____________________________  (Signature of Physician)                                                                                         (Date)                                   (Time)  Patient Name: Margaret Jonny Warrenler    : 3/14/1945   Printed: 1/15/2024      Medical Record #: [id number]                                              Page 1 of

## (undated) NOTE — LETTER
[place] [address]  Authorization for Invasive Procedures  Date: 7/17/2024           Time: 1319    I hereby authorize Dr. Vaz, my physician and his/her assistants (if applicable), which may include medical students, residents, and/or fellows, to perform the following surgical operation/ procedure and administer such anesthesia as may be determined necessary by my physician:  Operation/Procedure name (s)  Cardiac Catheterization, Left Ventricular Cineangiography, Bilateral Selective Coronary Angiography and/or Right Heart Catheterization; possible Percutaneous Transluminal Coronary Angioplasty, Coronary Atherectomy, Coronary Stent, Intracoronary Thrombolytic therapy, Antiplatelet therapy and/or Intravascular Ultrasound with [place]-Cristian placement on Margaret Silverio   2.   I recognize that during the surgical operation/procedure, unforeseen conditions may necessitate additional or different procedures than those listed above.  I, therefore, further authorize and request that the above-named surgeon, assistants, or designees perform such procedures as are, in their judgment, necessary and desirable.    3.   My surgeon/physician has discussed prior to my surgery the potential benefits, risks and side effects of this procedure; the likelihood of achieving goals; and potential problems that might occur during recuperation.  They also discussed reasonable alternatives to the procedure, including risks, benefits, and side effects related to the alternatives and risks related to not receiving this procedure.  I have had all my questions answered and I acknowledge that no guarantee has been made as to the result that may be obtained.    4.   Should the need arise during my operation/procedure, which includes change of level of care prior to discharge, I also consent to the administration of blood and/or blood products.  Further, I understand that despite careful testing and screening of blood  or blood products by collecting agencies, I may still be subject to ill effects as a result of receiving a blood transfusion and/or blood products.  The following are some, but not all, of the potential risks that can occur: fever and allergic reactions, hemolytic reactions, transmission of diseases such as Hepatitis, AIDS and Cytomegalovirus (CMV) and fluid overload.  In the event that I wish to have an autologous transfusion of my own blood, or a directed donor transfusion, I will discuss this with my physician.  Check only if Refusing Blood or Blood Products  I understand refusal of blood or blood products as deemed necessary by my physician may have serious consequences to my condition to include possible death. I hereby assume responsibility for my refusal and release the hospital, its personnel, and my physicians from any responsibility for the consequences of my refusal.          o  Refuse      5.   I authorize the use of any specimen, organs, tissues, body parts or foreign objects that may be removed from my body during the operation/procedure for diagnosis, research or teaching purposes and their subsequent disposal by hospital authorities.  I also authorize the release of specimen test results and/or written reports to my treating physician on the hospital medical staff or other referring or consulting physicians involved in my care, at the discretion of the Pathologist or my treating physician.    6.   I consent to the photographing or videotaping of the operations or procedures to be performed, including appropriate portions of my body for medical, scientific, or educational purposes, provided my identity is not revealed by the pictures or by descriptive texts accompanying them.  If the procedure has been photographed/videotaped, the surgeon will obtain the original picture, image, videotape or CD.  The hospital will not be responsible for storage, release or maintenance of the picture, image, tape or  CD.    7.   I consent to the presence of a  or observers in the operating room as deemed necessary by my physician or their designees.    8.   I recognize that in the event my procedure results in extended X-Ray/fluoroscopy time, I may develop a skin reaction.    9. If I have a Do Not Attempt Resuscitation (DNAR) order in place, that status will be suspended while in the operating room, procedural suite, and during the recovery period unless otherwise explicitly stated by me (or a person authorized to consent on my behalf). The surgeon or my attending physician will determine when the applicable recovery period ends for purposes of reinstating the DNAR order.  10. Patients having a sterilization procedure: I understand that if the procedure is successful the results will be permanent and it will therefore be impossible for me to inseminate, conceive, or bear children.  I also understand that the procedure is intended to result in sterility, although the result has not been guaranteed.   11. I acknowledge that my physician has explained sedation/analgesia administration to me including the risk and benefits I consent to the administration of sedation/analgesia as may be necessary or desirable in the judgment of my physician.    I CERTIFY THAT I HAVE READ AND FULLY UNDERSTAND THE ABOVE CONSENT TO OPERATION and/or OTHER PROCEDURE.        ____________________________________       _________________________________      ______________________________  Signature of Patient         Signature of Responsible Person        Printed Name of Responsible Person    ____________________________________      _________________________________      ______________________________       Signature of Witness          Relationship to Patient                       Date                                       Time  Patient Name: Margaret Fuller Jean Claude  : 3/14/1945    Reviewed: 2024   Printed: 2024  Medical  Record #: [id number] Page 1 of 2           STATEMENT OF PHYSICIAN My signature below affirms that prior to the time of the procedure; I have explained to the patient and/or his/her legal representative, the risks and benefits involved in the proposed treatment and any reasonable alternative to the proposed treatment. I have also explained the risks and benefits involved in refusal of the proposed treatment and alternatives to the proposed treatment and have answered the patient's questions. If I have a significant financial interest in a co-management agreement or a significant financial interest in any product or implant, or other significant relationship used in this procedure/surgery, I have disclosed this and had a discussion with my patient.     _______________________________________________________________ _____________________________  (Signature of Physician)                                                                                         (Date)                                   (Time)  Patient Name: Margaret Silverio  : 3/14/1945    Reviewed: 2024   Printed: 2024  Medical Record #: [id number] Page 2 of 2

## (undated) NOTE — LETTER
[place] [address]  Authorization for Invasive Procedures  Date: 07/17/24           Time: 0743    I hereby authorize  ________________________________, my physician and his/her assistants (if applicable), which may include medical students, residents, and/or fellows, to perform the following surgical operation/ procedure and administer such anesthesia as may be determined necessary by my physician:  Operation/Procedure name (s)  Cardiac Catheterization, Left Ventricular Cineangiography, Bilateral Selective Coronary Angiography and/or Right Heart Catheterization; possible Percutaneous Transluminal Coronary Angioplasty, Coronary Atherectomy, Coronary Stent, Intracoronary Thrombolytic therapy, Antiplatelet therapy and/or Intravascular Ultrasound on Margaret Silverio   2.   I recognize that during the surgical operation/procedure, unforeseen conditions may necessitate additional or different procedures than those listed above.  I, therefore, further authorize and request that the above-named surgeon, assistants, or designees perform such procedures as are, in their judgment, necessary and desirable.    3.   My surgeon/physician has discussed prior to my surgery the potential benefits, risks and side effects of this procedure; the likelihood of achieving goals; and potential problems that might occur during recuperation.  They also discussed reasonable alternatives to the procedure, including risks, benefits, and side effects related to the alternatives and risks related to not receiving this procedure.  I have had all my questions answered and I acknowledge that no guarantee has been made as to the result that may be obtained.    4.   Should the need arise during my operation/procedure, which includes change of level of care prior to discharge, I also consent to the administration of blood and/or blood products.  Further, I understand that despite careful testing and screening of blood  or blood products by collecting agencies, I may still be subject to ill effects as a result of receiving a blood transfusion and/or blood products.  The following are some, but not all, of the potential risks that can occur: fever and allergic reactions, hemolytic reactions, transmission of diseases such as Hepatitis, AIDS and Cytomegalovirus (CMV) and fluid overload.  In the event that I wish to have an autologous transfusion of my own blood, or a directed donor transfusion, I will discuss this with my physician.  Check only if Refusing Blood or Blood Products  I understand refusal of blood or blood products as deemed necessary by my physician may have serious consequences to my condition to include possible death. I hereby assume responsibility for my refusal and release the hospital, its personnel, and my physicians from any responsibility for the consequences of my refusal.          o  Refuse      5.   I authorize the use of any specimen, organs, tissues, body parts or foreign objects that may be removed from my body during the operation/procedure for diagnosis, research or teaching purposes and their subsequent disposal by hospital authorities.  I also authorize the release of specimen test results and/or written reports to my treating physician on the hospital medical staff or other referring or consulting physicians involved in my care, at the discretion of the Pathologist or my treating physician.    6.   I consent to the photographing or videotaping of the operations or procedures to be performed, including appropriate portions of my body for medical, scientific, or educational purposes, provided my identity is not revealed by the pictures or by descriptive texts accompanying them.  If the procedure has been photographed/videotaped, the surgeon will obtain the original picture, image, videotape or CD.  The hospital will not be responsible for storage, release or maintenance of the picture, image, tape or  CD.    7.   I consent to the presence of a  or observers in the operating room as deemed necessary by my physician or their designees.    8.   I recognize that in the event my procedure results in extended X-Ray/fluoroscopy time, I may develop a skin reaction.    9. If I have a Do Not Attempt Resuscitation (DNAR) order in place, that status will be suspended while in the operating room, procedural suite, and during the recovery period unless otherwise explicitly stated by me (or a person authorized to consent on my behalf). The surgeon or my attending physician will determine when the applicable recovery period ends for purposes of reinstating the DNAR order.  10. Patients having a sterilization procedure: I understand that if the procedure is successful the results will be permanent and it will therefore be impossible for me to inseminate, conceive, or bear children.  I also understand that the procedure is intended to result in sterility, although the result has not been guaranteed.   11. I acknowledge that my physician has explained sedation/analgesia administration to me including the risk and benefits I consent to the administration of sedation/analgesia as may be necessary or desirable in the judgment of my physician.    I CERTIFY THAT I HAVE READ AND FULLY UNDERSTAND THE ABOVE CONSENT TO OPERATION and/or OTHER PROCEDURE.        ____________________________________       _________________________________      ______________________________  Signature of Patient         Signature of Responsible Person        Printed Name of Responsible Person    ____________________________________      _________________________________      ______________________________       Signature of Witness          Relationship to Patient                       Date                                       Time  Patient Name: Margaret Fuller Jean Claude  : 3/14/1945    Reviewed: 2024   Printed: 2024  Medical  Record #: [id number] Page 1 of 2           STATEMENT OF PHYSICIAN My signature below affirms that prior to the time of the procedure; I have explained to the patient and/or his/her legal representative, the risks and benefits involved in the proposed treatment and any reasonable alternative to the proposed treatment. I have also explained the risks and benefits involved in refusal of the proposed treatment and alternatives to the proposed treatment and have answered the patient's questions. If I have a significant financial interest in a co-management agreement or a significant financial interest in any product or implant, or other significant relationship used in this procedure/surgery, I have disclosed this and had a discussion with my patient.     _______________________________________________________________ _____________________________  (Signature of Physician)                                                                                         (Date)                                   (Time)  Patient Name: Margaret Silverio  : 3/14/1945    Reviewed: 2024   Printed: 2024  Medical Record #: [id number] Page 2 of 2

## (undated) NOTE — LETTER
23        To Whom It May Concern:    Dr. Odessia Burkitt would like to perform an injection on your patient that requires holding your patient's anticoagulant. Please indicate if you approve of patient holding medication for specified amount of time and fax back to number indicated at bottom of letter. Lazaro Moon  :  3/14/1945    []  Patient has been cleared to hold ELIQUIS for 2 days prior to procedure: C7-T1 Interlaminar Epidural Steroid Injection. Holding the medication(s) may put the patient at an increased risk for stroke, heart attack, or neurologic or cardiac events. []  Patient has NOT been cleared to hold ELIQUIS for 2 days prior to procedure: C7-T1 Interlaminar Epidural Steroid Injection. X_________________________________________  (Provider signature)                                     (Date)      Please make a selection, sign and fax this letter back to our office    If this office may be of further assistance, please do not hesitate to contact us. Sincerely,  Alex Ashton.  Odessia Burkitt, DO    Phone #: [phone]  Fax #: [phone]

## (undated) NOTE — Clinical Note
FYI, TCM call made, see notes. NCM attempted to schedule a TCM HFU. Patient needs an appointment before 12:00 noon, due to MD's full schedule, message sent to MD's office requesting assistance in scheduling.

## (undated) NOTE — LETTER
[place] [address]  Authorization for Invasive Procedures  Date: 1/15/2024           Time: 1200    I hereby authorize Dr. Barrientos , my physician and his/her assistants (if applicable), which may include medical students, residents, and/or fellows, to perform the following surgical operation/ procedure and administer such anesthesia as may be determined necessary by my physician: Esophagogastroduodenoscopy and colonoscopy  on Margaret Silverio  2.   I recognize that during the surgical operation/procedure, unforeseen conditions may necessitate additional or different procedures than those listed above.  I, therefore, further authorize and request that the above-named surgeon, assistants, or designees perform such procedures as are, in their judgment, necessary and desirable.    3.   My surgeon/physician has discussed prior to my surgery the potential benefits, risks and side effects of this procedure; the likelihood of achieving goals; and potential problems that might occur during recuperation.  They also discussed reasonable alternatives to the procedure, including risks, benefits, and side effects related to the alternatives and risks related to not receiving this procedure.  I have had all my questions answered and I acknowledge that no guarantee has been made as to the result that may be obtained.    4.   Should the need arise during my operation/procedure, which includes change of level of care prior to discharge, I also consent to the administration of blood and/or blood products.  Further, I understand that despite careful testing and screening of blood or blood products by collecting agencies, I may still be subject to ill effects as a result of receiving a blood transfusion and/or blood products.  The following are some, but not all, of the potential risks that can occur: fever and allergic reactions, hemolytic reactions, transmission of diseases such as Hepatitis, AIDS and  Cytomegalovirus (CMV) and fluid overload.  In the event that I wish to have an autologous transfusion of my own blood, or a directed donor transfusion, I will discuss this with my physician.   Check only if Refusing Blood or Blood Products  I understand refusal of blood or blood products as deemed necessary by my physician may have serious consequences to my condition to include possible death. I hereby assume responsibility for my refusal and release the hospital, its personnel, and my physicians from any responsibility for the consequences of my refusal.         o  Refuse         5.   I authorize the use of any specimen, organs, tissues, body parts or foreign objects that may be removed from my body during the operation/procedure for diagnosis, research or teaching purposes and their subsequent disposal by hospital authorities.  I also authorize the release of specimen test results and/or written reports to my treating physician on the hospital medical staff or other referring or consulting physicians involved in my care, at the discretion of the Pathologist or my treating physician.    6.   I consent to the photographing or videotaping of the operations or procedures to be performed, including appropriate portions of my body for medical, scientific, or educational purposes, provided my identity is not revealed by the pictures or by descriptive texts accompanying them.  If the procedure has been photographed/videotaped, the surgeon will obtain the original picture, image, videotape or CD.  The hospital will not be responsible for storage, release or maintenance of the picture, image, tape or CD.    7.   I consent to the presence of a  or observers in the operating room as deemed necessary by my physician or their designees.    8.   I recognize that in the event my procedure results in extended X-Ray/fluoroscopy time, I may develop a skin reaction.    9. If I have a Do Not Attempt Resuscitation  (DNAR) order in place, that status will be suspended while in the operating room, procedural suite, and during the recovery period unless otherwise explicitly stated by me (or a person authorized to consent on my behalf). The surgeon or my attending physician will determine when the applicable recovery period ends for purposes of reinstating the DNAR order.  10. Patients having a sterilization procedure: I understand that if the procedure is successful the results will be permanent and it will therefore be impossible for me to inseminate, conceive, or bear children.  I also understand that the procedure is intended to result in sterility, although the result has not been guaranteed.   11. I acknowledge that my physician has explained sedation/analgesia administration to me including the risk and benefits I consent to the administration of sedation/analgesia as may be necessary or desirable in the judgment of my physician.    I CERTIFY THAT I HAVE READ AND FULLY UNDERSTAND THE ABOVE CONSENT TO OPERATION and/or OTHER PROCEDURE.        ____________________________________       _________________________________      ______________________________  Signature of Patient         Signature of Responsible Person        Printed Name of Responsible Person        ____________________________________      _________________________________      ______________________________       Signature of Witness          Relationship to Patient                       Date                                       Time    Patient Name: Margaret Silverio     : 3/14/1945                 Printed: January 15, 2024      Medical Record #: [id number]                      Page 1 of 2          STATEMENT OF PHYSICIAN My signature below affirms that prior to the time of the procedure; I have explained to the patient and/or his/her legal representative, the risks and benefits involved in the proposed treatment and any reasonable alternative to the  proposed treatment. I have also explained the risks and benefits involved in refusal of the proposed treatment and alternatives to the proposed treatment and have answered the patient's questions. If I have a significant financial interest in a co-management agreement or a significant financial interest in any product or implant, or other significant relationship used in this procedure/surgery, I have disclosed this and had a discussion with my patient.     _______________________________________________________________ _____________________________  (Signature of Physician)                                                                                         (Date)                                   (Time)    Patient Name: Margaret Silverio     : 3/14/1945                 Printed: January 15, 2024      Medical Record #: [id number]                      Page 2 of 2

## (undated) NOTE — Clinical Note
DAMIAN, TCM call complete, see [facility] notes. [facility] placed a call to MD's office and spoke with Angy, Lead PSR who was able to schedule a TCM appointment on 1/26/2024 at 11:30 am. [facility] has made multiple calls patient's phone goes right to Voice mail, left  requesting a return call on both patient's phone and spouses (Low) phone. and also provided date and time of TCM appointment. [facility] will notify MD's office.

## (undated) NOTE — LETTER
[address]  Authorization for Surgical Operation and Procedure                                                                                           I hereby authorize Brent Ashley MD, my physician and his/her assistants (if applicable), which may include medical students, residents, and/or fellows, to perform the following surgical operation/ procedure and administer such anesthesia as may be determined necessary by my physician: Operation/Procedure name (s) ESOPHAGOGASTRODUODENOSCOPY (EGD) on [facility]   2. I recognize that during the surgical operation/procedure, unforeseen conditions may necessitate additional or different procedures than those listed above. I, therefore, further authorize and request that the above-named surgeon, assistants, or designees perform such procedures as are, in their judgment, necessary and desirable. 3.   My surgeon/physician has discussed prior to my surgery the potential benefits, risks and side effects of this procedure; the likelihood of achieving goals; and potential problems that might occur during recuperation. They also discussed reasonable alternatives to the procedure, including risks, benefits, and side effects related to the alternatives and risks related to not receiving this procedure. I have had all my questions answered and I acknowledge that no guarantee has been made as to the result that may be obtained. 4.   Should the need arise during my operation/procedure, which includes change of level of care prior to discharge, I also consent to the administration of blood and/or blood products. Further, I understand that despite careful testing and screening of blood or blood products by collecting agencies, I may still be subject to ill effects as a result of receiving a blood transfusion and/or blood products.   The following are some, but not all, of the potential risks that can occur: fever and allergic reactions, hemolytic reactions, transmission of diseases such as Hepatitis, AIDS and Cytomegalovirus (CMV) and fluid overload. In the event that I wish to have an autologous transfusion of my own blood, or a directed donor transfusion, I will discuss this with my physician. Check only if Refusing Blood or Blood Products  I understand refusal of blood or blood products as deemed necessary by my physician may have serious consequences to my condition to include possible death. I hereby assume responsibility for my refusal and release the hospital, its personnel, and my physicians from any responsibility for the consequences of my refusal.    o  Refuse   5. I authorize the use of any specimen, organs, tissues, body parts or foreign objects that may be removed from my body during the operation/procedure for diagnosis, research or teaching purposes and their subsequent disposal by hospital authorities. I also authorize the release of specimen test results and/or written reports to my treating physician on the hospital medical staff or other referring or consulting physicians involved in my care, at the discretion of the Pathologist or my treating physician. 6.   I consent to the photographing or videotaping of the operations or procedures to be performed, including appropriate portions of my body for medical, scientific, or educational purposes, provided my identity is not revealed by the pictures or by descriptive texts accompanying them. If the procedure has been photographed/videotaped, the surgeon will obtain the original picture, image, videotape or CD. The hospital will not be responsible for storage, release or maintenance of the picture, image, tape or CD.    7.   I consent to the presence of a  or observers in the operating room as deemed necessary by my physician or their designees.     8.   I recognize that in the event my procedure results in extended X-Ray/fluoroscopy time, I may develop a skin reaction. 9. If I have a Do Not Attempt Resuscitation (DNAR) order in place, that status will be suspended while in the operating room, procedural suite, and during the recovery period unless otherwise explicitly stated by me (or a person authorized to consent on my behalf). The surgeon or my attending physician will determine when the applicable recovery period ends for purposes of reinstating the DNAR order. 10. Patients having a sterilization procedure: I understand that if the procedure is successful the results will be permanent and it will therefore be impossible for me to inseminate, conceive, or bear children. I also understand that the procedure is intended to result in sterility, although the result has not been guaranteed. 11. I acknowledge that my physician has explained sedation/analgesia administration to me including the risk and benefits I consent to the administration of sedation/analgesia as may be necessary or desirable in the judgment of my physician. I CERTIFY THAT I HAVE READ AND FULLY UNDERSTAND THE ABOVE CONSENT TO OPERATION and/or OTHER PROCEDURE.     _________________________________________ _________________________________     ___________________________________  Signature of Patient     Signature of Responsible Person                   Printed Name of Responsible Person                              _________________________________________ ______________________________        ___________________________________  Signature of Witness         Date  Time         Relationship to Patient    STATEMENT OF PHYSICIAN My signature below affirms that prior to the time of the procedure; I have explained to the patient and/or his/her legal representative, the risks and benefits involved in the proposed treatment and any reasonable alternative to the proposed treatment.  I have also explained the risks and benefits involved in refusal of the proposed treatment and alternatives to the proposed treatment and have answered the patient's questions.  If I have a significant financial interest in a co-management agreement or a significant financial interest in any product or implant, or other significant relationship used in this procedure/surgery, I have disclosed this and had a discussion with my patient.     _______________________________________________________________ _____________________________  (Signature of Physician)                                                                                         (Date)                                   (Time)  Patient Name: Abraham Alonso    :    Printed: 2023      Medical Record #: [id number]                                              Page 1 of 1

## (undated) NOTE — LETTER
OPIOID TREATMENT AGREEMENT    For Pain Management      Please read each statement, initial at the bottom of each page, and sign the last page to indicate your agreement with this form. If you have any questions about any information in this form or the opioid treatment plan, please request immediate clarification from your physician or health care provider.     The purpose of this agreement is to give you information about the medications you will be taking for pain management and to assure that you and your physician/health care provider comply with state and federal regulations concerning the prescribing of controlled substances. A trial of opioid therapy can be considered for moderate to severe pain with the intent of reducing pain and increasing function. The physician’s goal is for you to have the best quality of life possible given the reality of your clinical condition. The success of treatment depends on mutual trust and honesty in the physician/patient relationship and full agreement and understanding of the risks and benefits of using opioids to treat pain.    I agree to use opioids (morphine-like drugs) as part of my treatment for chronic pain.  I understand that these drugs can be effective, but have a high potential for misuse and are therefore closely controlled by the local, state, and federal government. I understand that my physician/health care provider is prescribing such medication to help manage my pain, and I agree to the following conditions as part of my treatment plan    I am responsible for my pain medications.  I agree to take the medication only as prescribed.    I understand that increasing my dose without the close supervision of my physician could    lead to drug overdose causing severe sedation and respiratory depression and death.    I understand that decreasing or stopping my medication without the close supervision of my physician can lead to withdrawal. Withdrawal symptoms can  include yawning, sweating, watery eyes, runny nose, anxiety, tremors, aching muscles, hot and cold flashes, “goose bumps”, abdominal cramps, and diarrhea.  These symptoms can occur 24-48 hours after the last dose and can last up to 3 weeks.    I will not request or accept controlled substance medication from any other physician or individual while I am receiving such medication from my physician/health care provider at the clinic.    I acknowledge that there are side effects with opioid therapy, and I understand it is my responsibility to notify my physician/health care provider for any side effect that continue or are severe (i.e., difficulty breathing, slow heart rate, sedation, confusion).  I am also responsible for notifying my pain physician immediately if I need to visit another physician or need to visit an emergency room due to pain, of if I become pregnant.    I understand that the opioid medication is strictly for my own use and I agree not to give or sell my medication to others because it may endanger another person’s health and is against the law.    I will inform my physician of all medications I am taking, including herbal remedies.  Medications like Valium or Ativan; sedatives such as Soma, Xanax, Fiorinal; antihistamines like Benadryl; herbal remedies, alcohol, and cough syrup containing alcohol, codeine, or hydrocodone can interact with opioids and produce serious side effects.    I understand I will be expected to return to the clinic as instructed by my provider during the time any of my medication is being adjusted.    I understand that any evidence of drug hoarding, acquisition of any opioid medication or adjunctive analgesia from other physicians (which includes emergency rooms), uncontrolled dose escalation or reduction, loss of prescriptions or failure to follow agreement may result in change to the treatment plan, referral to the Medication Assisted Therapy Program, an may result in  termination of the physician/patient relationship.    I will not use any illicit substances, such as cocaine, marijuana, etc. while taking these medications.  I understand that use of any illicit substances while taking these medications may result in a change to my treatment plan, referral to the [facility], and may result in termination of the physician/patient relationship.    I understand that I should not consume alcohol while taking these medications because the use of alcohol together with opioid medications is warned against.    I am responsible for my opioid prescriptions.  I understand that:    Refill prescriptions can be written for a one month supply and increased to a maximum of two months at the discretion of the provider.    It is my responsibility to schedule appointments for the next opioid refill to ensure I do not run out of medications.    I am responsible for keeping my prescriptions and pain medications in a safe and secure place, such as a locked cabinet or safe.  I am expected to protect my medications from loss or theft.  I am responsible for taking the medication in the dose prescribed and for keeping track of the amount remaining.  If my medication is stolen, I will report this to my local police department and obtain a stolen item report.  I will then report the stolen medication to my physician.  If my medications are lost, misplaced, or stolen, my physician may choose not to replace the medications.    Refills issued by physicians/health care providers in this clinic can only be filled by a pharmacy in the State of [place], even if I am a resident of another state.    Prescriptions for pain medicine or any other prescriptions will be written only during an office visit or during regular office hours.  No refills of any medications during the evening or on weekends.    I must bring back all opioid medications and adjunctive medications prescribed by my physician  in the original containers/bottles at every visit.    Prescriptions will not be written in advance due to vacations, meetings, or other commitments.    If an appointment for a prescription refill is missed, another appointment will be made as soon as possible.  Immediate or emergency appointments will not be possible.    I understand while physical dependence is to be expected after long-term use of opioids, signs of addiction, abuse, or misuse shall prompt the need for substance dependence treatment as well as weaning and detoxification from the opioids.  I further understand the following:    Physical dependence is common to many drugs such as blood pressure medications, anti-seizure medications, and opioids.  It results in biochemical changes such that abruptly stopping these drugs will cause a withdrawal response.  It should be noted that physical dependence does not equal addiction.  A person can be dependent on insulin to treat diabetes or dependent on prednisone (steroids) to treat asthma, but not addicted to the insulin or prednisone.    Addiction is a primary, chronic neurobiologic disease with genetic, psychosocial and environmental factors influencing its development and manifestation.  It is characterized by behavior that includes one or more of the following: impaired control over drug use, compulsive use, continued use despite harm, and cravings.  This means the drug decreases a person’s quality of life.  If a patient exhibits such behavior, the drug will be tapered and the patient will not be a candidate for an opioid trial.  He/she will be referred to an addiction medicine specialist.    Tolerance means a state of adaption in which exposure to the drug induces changes that result in a lessening of one or more of the drug’s effects over time.  The dose of the opioid may have to be adjusted up or down to a dose that produces maximum function and a realistic decrease of the patient’s pain.    If it  appears to my physician/health care provider that there is no improvement in my daily function or quality of life from the controlled substance, I understand my opioids may be discontinued.    If I have a history of alcohol or drug misuse/addiction, I must notify the physician of such history since the treatment with opioids for may increase the possibility of relapse.    I agree and understand that my physician reserves the right to perform random or unannounced urine drug testing.  If requested to provide a urine sample, I agree to cooperate.  If I decide not to provide a urine sample, I understand that my physician may change my treatment plan, including discontinuation of my opioid medications when applicable or complete termination of the physician/patient relationship.  The presence of non-prescribed drug(s) or illicit drug(s) in the urine can be grounds for termination of the physician/patient relationship.  Urine drug testing is not forensic testing, but is done for my benefit as a diagnostic tool and in accordance with certain legal and regulatory guidance on the use of controlled substances to treat pain.    I agree to allow my physician/violeta care provider to contact any health care professional, including behavioral health, family member, pharmacy, legal authority, or regulator agency to obtain or provide information about my care or actions if the physician feels it is necessary.    I understand that non-compliance with the above conditions may result in a re-evaluation of my treatment plan, referral to the [facility], discontinuation of opioid therapy, and possible discharge from the clinic.    I agree to work closely with my physician/health care provider to assure the agreed plan of care is followed.    I acknowledge that I have received a copy of this agreement for my records.          I __________________________________________ have read the above information or it has been  read to me.  All my questions regarding the treatment of pain with opioids have been answered to my satisfaction, and I understand all of the conditions of my participation in the opioid treatment plan listed above.  I hereby agree to the conditions listed about and consent to participate in the opioid medication therapy.        ______________________________    ____________    ______________________________              Patient Signature                                  Date                        Patient Printed Name  ______________________________    ____________    ______________________________              Witness Signature                                Date                       Witness Printed Name

## (undated) NOTE — LETTER
1/25/2024              Margaret Silverio        [id number] [facility] [address]         Dear Margaret,    This letter is to inform you that our office has made several attempts to reach you by phone without success.  We were attempting to contact you by phone to schedule a hospital follow up appointment with gastroenterology.    Please contact our office at the number listed below as soon as you receive this letter to discuss this issue and to make the necessary changes in our system to your contact information.  Thank you for your cooperation.        Sincerely,    Tiffany Reynolds PA-C  [address] [id number] [phone]